# Patient Record
Sex: FEMALE | Race: WHITE | HISPANIC OR LATINO | Employment: OTHER | URBAN - METROPOLITAN AREA
[De-identification: names, ages, dates, MRNs, and addresses within clinical notes are randomized per-mention and may not be internally consistent; named-entity substitution may affect disease eponyms.]

---

## 2016-06-16 LAB — HCV AB SER-ACNC: NONREACTIVE

## 2017-01-18 ENCOUNTER — APPOINTMENT (EMERGENCY)
Dept: CT IMAGING | Facility: HOSPITAL | Age: 47
End: 2017-01-18
Payer: COMMERCIAL

## 2017-01-18 ENCOUNTER — APPOINTMENT (EMERGENCY)
Dept: RADIOLOGY | Facility: HOSPITAL | Age: 47
End: 2017-01-18
Payer: COMMERCIAL

## 2017-01-18 ENCOUNTER — HOSPITAL ENCOUNTER (EMERGENCY)
Facility: HOSPITAL | Age: 47
Discharge: HOME | End: 2017-01-19
Attending: EMERGENCY MEDICINE | Admitting: EMERGENCY MEDICINE
Payer: COMMERCIAL

## 2017-01-18 ENCOUNTER — ALLSCRIPTS OFFICE VISIT (OUTPATIENT)
Dept: OTHER | Facility: OTHER | Age: 47
End: 2017-01-18

## 2017-01-18 ENCOUNTER — APPOINTMENT (OUTPATIENT)
Dept: LAB | Facility: HOSPITAL | Age: 47
End: 2017-01-18
Payer: COMMERCIAL

## 2017-01-18 DIAGNOSIS — N39.0 URINARY TRACT INFECTION: Primary | ICD-10-CM

## 2017-01-18 DIAGNOSIS — R32 URINARY INCONTINENCE: ICD-10-CM

## 2017-01-18 DIAGNOSIS — R29.898 OTHER SYMPTOMS AND SIGNS INVOLVING THE MUSCULOSKELETAL SYSTEM: ICD-10-CM

## 2017-01-18 LAB
ALBUMIN SERPL BCP-MCNC: 3.4 G/DL (ref 3.5–5)
ALP SERPL-CCNC: 154 U/L (ref 46–116)
ALT SERPL W P-5'-P-CCNC: 26 U/L (ref 12–78)
ANION GAP SERPL CALCULATED.3IONS-SCNC: 9 MMOL/L (ref 4–13)
AST SERPL W P-5'-P-CCNC: 30 U/L (ref 5–45)
BACTERIA UR QL AUTO: ABNORMAL /HPF
BACTERIA UR QL AUTO: ABNORMAL /HPF
BASOPHILS # BLD AUTO: 0.02 THOUSANDS/ΜL (ref 0–0.1)
BASOPHILS NFR BLD AUTO: 0 % (ref 0–1)
BILIRUB SERPL-MCNC: 0.22 MG/DL (ref 0.2–1)
BILIRUB UR QL STRIP: NEGATIVE
BUN SERPL-MCNC: 28 MG/DL (ref 5–25)
CALCIUM SERPL-MCNC: 9.1 MG/DL (ref 8.3–10.1)
CHLORIDE SERPL-SCNC: 106 MMOL/L (ref 100–108)
CLARITY UR: CLEAR
CLARITY UR: CLEAR
CLARITY UR: NORMAL
CO2 SERPL-SCNC: 26 MMOL/L (ref 21–32)
COLOR UR: YELLOW
CREAT SERPL-MCNC: 1.99 MG/DL (ref 0.6–1.3)
EOSINOPHIL # BLD AUTO: 0.04 THOUSAND/ΜL (ref 0–0.61)
EOSINOPHIL NFR BLD AUTO: 1 % (ref 0–6)
ERYTHROCYTE [DISTWIDTH] IN BLOOD BY AUTOMATED COUNT: 13.4 % (ref 11.6–15.1)
GFR SERPL CREATININE-BSD FRML MDRD: 27 ML/MIN/1.73SQ M
GLUCOSE (HISTORICAL): NEGATIVE
GLUCOSE SERPL-MCNC: 97 MG/DL (ref 65–140)
GLUCOSE UR STRIP-MCNC: NEGATIVE MG/DL
GLUCOSE UR STRIP-MCNC: NEGATIVE MG/DL
HCG UR QL: NEGATIVE
HCT VFR BLD AUTO: 34.6 % (ref 34.8–46.1)
HGB BLD-MCNC: 11.6 G/DL (ref 11.5–15.4)
HGB UR QL STRIP.AUTO: ABNORMAL
HGB UR QL STRIP.AUTO: ABNORMAL
HGB UR QL STRIP.AUTO: NORMAL
KETONES UR STRIP-MCNC: NEGATIVE MG/DL
LEUKOCYTE ESTERASE UR QL STRIP: NEGATIVE
LIPASE SERPL-CCNC: 294 U/L (ref 73–393)
LYMPHOCYTES # BLD AUTO: 1.39 THOUSANDS/ΜL (ref 0.6–4.47)
LYMPHOCYTES NFR BLD AUTO: 24 % (ref 14–44)
MCH RBC QN AUTO: 31 PG (ref 26.8–34.3)
MCHC RBC AUTO-ENTMCNC: 33.5 G/DL (ref 31.4–37.4)
MCV RBC AUTO: 93 FL (ref 82–98)
MONOCYTES # BLD AUTO: 0.54 THOUSAND/ΜL (ref 0.17–1.22)
MONOCYTES NFR BLD AUTO: 10 % (ref 4–12)
NEUTROPHILS # BLD AUTO: 3.72 THOUSANDS/ΜL (ref 1.85–7.62)
NEUTS SEG NFR BLD AUTO: 65 % (ref 43–75)
NITRITE UR QL STRIP: NEGATIVE
NON-SQ EPI CELLS URNS QL MICRO: ABNORMAL /HPF
NON-SQ EPI CELLS URNS QL MICRO: ABNORMAL /HPF
NRBC BLD AUTO-RTO: 0 /100 WBCS
PH UR STRIP.AUTO: 5 [PH]
PH UR STRIP.AUTO: 5.5 [PH] (ref 4.5–8)
PH UR STRIP.AUTO: 6 [PH] (ref 4.5–8)
PLATELET # BLD AUTO: 282 THOUSANDS/UL (ref 149–390)
PMV BLD AUTO: 9.9 FL (ref 8.9–12.7)
POTASSIUM SERPL-SCNC: 4.2 MMOL/L (ref 3.5–5.3)
PROT SERPL-MCNC: 7.3 G/DL (ref 6.4–8.2)
PROT UR STRIP-MCNC: 300 MG/DL
PROT UR STRIP-MCNC: >=300 MG/DL
PROT UR STRIP-MCNC: ABNORMAL MG/DL
RBC # BLD AUTO: 3.74 MILLION/UL (ref 3.81–5.12)
RBC #/AREA URNS AUTO: ABNORMAL /HPF
RBC #/AREA URNS AUTO: ABNORMAL /HPF
SODIUM SERPL-SCNC: 141 MMOL/L (ref 136–145)
SP GR UR STRIP.AUTO: 1.01
SP GR UR STRIP.AUTO: 1.02 (ref 1–1.03)
SP GR UR STRIP.AUTO: >=1.03 (ref 1–1.03)
UROBILINOGEN UR QL STRIP.AUTO: 0.2
UROBILINOGEN UR QL STRIP.AUTO: 0.2 E.U./DL
UROBILINOGEN UR QL STRIP.AUTO: 0.2 E.U./DL
WBC # BLD AUTO: 5.71 THOUSAND/UL (ref 4.31–10.16)
WBC #/AREA URNS AUTO: ABNORMAL /HPF
WBC #/AREA URNS AUTO: ABNORMAL /HPF

## 2017-01-18 PROCEDURE — 87086 URINE CULTURE/COLONY COUNT: CPT

## 2017-01-18 PROCEDURE — 81025 URINE PREGNANCY TEST: CPT

## 2017-01-18 PROCEDURE — 93005 ELECTROCARDIOGRAM TRACING: CPT | Performed by: EMERGENCY MEDICINE

## 2017-01-18 PROCEDURE — 81002 URINALYSIS NONAUTO W/O SCOPE: CPT

## 2017-01-18 PROCEDURE — 80053 COMPREHEN METABOLIC PANEL: CPT | Performed by: EMERGENCY MEDICINE

## 2017-01-18 PROCEDURE — 96375 TX/PRO/DX INJ NEW DRUG ADDON: CPT

## 2017-01-18 PROCEDURE — 85025 COMPLETE CBC W/AUTO DIFF WBC: CPT | Performed by: EMERGENCY MEDICINE

## 2017-01-18 PROCEDURE — 71020 HB CHEST X-RAY 2VW FRONTAL&LATL: CPT

## 2017-01-18 PROCEDURE — 74176 CT ABD & PELVIS W/O CONTRAST: CPT

## 2017-01-18 PROCEDURE — 96374 THER/PROPH/DIAG INJ IV PUSH: CPT

## 2017-01-18 PROCEDURE — 81001 URINALYSIS AUTO W/SCOPE: CPT

## 2017-01-18 PROCEDURE — 84484 ASSAY OF TROPONIN QUANT: CPT | Performed by: EMERGENCY MEDICINE

## 2017-01-18 PROCEDURE — 83690 ASSAY OF LIPASE: CPT | Performed by: EMERGENCY MEDICINE

## 2017-01-18 PROCEDURE — 36415 COLL VENOUS BLD VENIPUNCTURE: CPT | Performed by: EMERGENCY MEDICINE

## 2017-01-18 RX ORDER — ONDANSETRON 2 MG/ML
4 INJECTION INTRAMUSCULAR; INTRAVENOUS ONCE
Status: COMPLETED | OUTPATIENT
Start: 2017-01-18 | End: 2017-01-18

## 2017-01-18 RX ADMIN — ONDANSETRON 4 MG: 2 INJECTION INTRAMUSCULAR; INTRAVENOUS at 22:08

## 2017-01-18 RX ADMIN — HYDROMORPHONE HYDROCHLORIDE 1 MG: 1 INJECTION, SOLUTION INTRAMUSCULAR; INTRAVENOUS; SUBCUTANEOUS at 22:10

## 2017-01-19 ENCOUNTER — GENERIC CONVERSION - ENCOUNTER (OUTPATIENT)
Dept: OTHER | Facility: OTHER | Age: 47
End: 2017-01-19

## 2017-01-19 VITALS
SYSTOLIC BLOOD PRESSURE: 118 MMHG | BODY MASS INDEX: 36.14 KG/M2 | HEART RATE: 73 BPM | TEMPERATURE: 98.1 F | OXYGEN SATURATION: 96 % | DIASTOLIC BLOOD PRESSURE: 56 MMHG | RESPIRATION RATE: 20 BRPM | WEIGHT: 197.6 LBS

## 2017-01-19 LAB
ATRIAL RATE: 77 BPM
BACTERIA UR CULT: NORMAL
P AXIS: 47 DEGREES
PR INTERVAL: 130 MS
QRS AXIS: -8 DEGREES
QRSD INTERVAL: 74 MS
QT INTERVAL: 356 MS
QTC INTERVAL: 402 MS
T WAVE AXIS: -33 DEGREES
TROPONIN I SERPL-MCNC: <0.02 NG/ML
VENTRICULAR RATE: 77 BPM

## 2017-01-19 PROCEDURE — 99285 EMERGENCY DEPT VISIT HI MDM: CPT

## 2017-01-19 RX ORDER — CEPHALEXIN 500 MG/1
500 CAPSULE ORAL 2 TIMES DAILY
Qty: 20 CAPSULE | Refills: 0 | Status: SHIPPED | OUTPATIENT
Start: 2017-01-19 | End: 2017-01-29

## 2017-01-20 LAB — BACTERIA UR CULT: NORMAL

## 2017-01-25 ENCOUNTER — ALLSCRIPTS OFFICE VISIT (OUTPATIENT)
Dept: OTHER | Facility: OTHER | Age: 47
End: 2017-01-25

## 2017-01-25 LAB
BILIRUB UR QL STRIP: NORMAL
CLARITY UR: NORMAL
COLOR UR: YELLOW
GLUCOSE (HISTORICAL): NORMAL
HGB UR QL STRIP.AUTO: NORMAL
KETONES UR STRIP-MCNC: NORMAL MG/DL
LEUKOCYTE ESTERASE UR QL STRIP: NORMAL
NITRITE UR QL STRIP: NORMAL
PH UR STRIP.AUTO: 5 [PH]
PROT UR STRIP-MCNC: NORMAL MG/DL
SP GR UR STRIP.AUTO: 1.03
UROBILINOGEN UR QL STRIP.AUTO: 0.2

## 2017-02-10 ENCOUNTER — HOSPITAL ENCOUNTER (OUTPATIENT)
Dept: SLEEP CENTER | Facility: CLINIC | Age: 47
Discharge: HOME | End: 2017-02-10
Payer: COMMERCIAL

## 2017-02-10 DIAGNOSIS — G47.411 CATAPLEXY: ICD-10-CM

## 2017-02-10 DIAGNOSIS — G47.9 DISTURBANCE, SLEEP: ICD-10-CM

## 2017-02-27 ENCOUNTER — ALLSCRIPTS OFFICE VISIT (OUTPATIENT)
Dept: OTHER | Facility: OTHER | Age: 47
End: 2017-02-27

## 2017-03-26 ENCOUNTER — HOSPITAL ENCOUNTER (EMERGENCY)
Facility: HOSPITAL | Age: 47
Discharge: HOME | End: 2017-03-26
Attending: EMERGENCY MEDICINE | Admitting: EMERGENCY MEDICINE
Payer: COMMERCIAL

## 2017-03-26 VITALS
BODY MASS INDEX: 30.48 KG/M2 | RESPIRATION RATE: 16 BRPM | TEMPERATURE: 97.4 F | HEART RATE: 79 BPM | OXYGEN SATURATION: 97 % | DIASTOLIC BLOOD PRESSURE: 67 MMHG | SYSTOLIC BLOOD PRESSURE: 164 MMHG | WEIGHT: 166.67 LBS

## 2017-03-26 DIAGNOSIS — N18.30 CKD (CHRONIC KIDNEY DISEASE) STAGE 3, GFR 30-59 ML/MIN (HCC): Primary | Chronic | ICD-10-CM

## 2017-03-26 DIAGNOSIS — R31.9 HEMATURIA: ICD-10-CM

## 2017-03-26 DIAGNOSIS — R35.0 URINARY FREQUENCY: ICD-10-CM

## 2017-03-26 LAB
ALBUMIN SERPL BCP-MCNC: 3.2 G/DL (ref 3.5–5)
ALP SERPL-CCNC: 163 U/L (ref 46–116)
ALT SERPL W P-5'-P-CCNC: 25 U/L (ref 12–78)
ANION GAP SERPL CALCULATED.3IONS-SCNC: 10 MMOL/L (ref 4–13)
AST SERPL W P-5'-P-CCNC: 16 U/L (ref 5–45)
BACTERIA UR QL AUTO: ABNORMAL /HPF
BASOPHILS # BLD AUTO: 0.02 THOUSANDS/ΜL (ref 0–0.1)
BASOPHILS NFR BLD AUTO: 0 % (ref 0–1)
BILIRUB SERPL-MCNC: <0.1 MG/DL (ref 0.2–1)
BILIRUB UR QL STRIP: NEGATIVE
BUN SERPL-MCNC: 38 MG/DL (ref 5–25)
CALCIUM SERPL-MCNC: 8.8 MG/DL (ref 8.3–10.1)
CHLORIDE SERPL-SCNC: 108 MMOL/L (ref 100–108)
CLARITY UR: CLEAR
CO2 SERPL-SCNC: 23 MMOL/L (ref 21–32)
COLOR UR: YELLOW
CREAT SERPL-MCNC: 1.81 MG/DL (ref 0.6–1.3)
EOSINOPHIL # BLD AUTO: 0.03 THOUSAND/ΜL (ref 0–0.61)
EOSINOPHIL NFR BLD AUTO: 1 % (ref 0–6)
ERYTHROCYTE [DISTWIDTH] IN BLOOD BY AUTOMATED COUNT: 12.9 % (ref 11.6–15.1)
GFR SERPL CREATININE-BSD FRML MDRD: 30.1 ML/MIN/1.73SQ M
GLUCOSE SERPL-MCNC: 97 MG/DL (ref 65–140)
GLUCOSE UR STRIP-MCNC: NEGATIVE MG/DL
HCG UR QL: NEGATIVE
HCT VFR BLD AUTO: 36.6 % (ref 34.8–46.1)
HGB BLD-MCNC: 12.4 G/DL (ref 11.5–15.4)
HGB UR QL STRIP.AUTO: ABNORMAL
KETONES UR STRIP-MCNC: NEGATIVE MG/DL
LEUKOCYTE ESTERASE UR QL STRIP: NEGATIVE
LYMPHOCYTES # BLD AUTO: 1.5 THOUSANDS/ΜL (ref 0.6–4.47)
LYMPHOCYTES NFR BLD AUTO: 31 % (ref 14–44)
MCH RBC QN AUTO: 31.3 PG (ref 26.8–34.3)
MCHC RBC AUTO-ENTMCNC: 33.9 G/DL (ref 31.4–37.4)
MCV RBC AUTO: 92 FL (ref 82–98)
MONOCYTES # BLD AUTO: 0.58 THOUSAND/ΜL (ref 0.17–1.22)
MONOCYTES NFR BLD AUTO: 12 % (ref 4–12)
NEUTROPHILS # BLD AUTO: 2.71 THOUSANDS/ΜL (ref 1.85–7.62)
NEUTS SEG NFR BLD AUTO: 56 % (ref 43–75)
NITRITE UR QL STRIP: NEGATIVE
NON-SQ EPI CELLS URNS QL MICRO: ABNORMAL /HPF
NRBC BLD AUTO-RTO: 0 /100 WBCS
PH UR STRIP.AUTO: 5.5 [PH] (ref 4.5–8)
PLATELET # BLD AUTO: 285 THOUSANDS/UL (ref 149–390)
PMV BLD AUTO: 9.9 FL (ref 8.9–12.7)
POTASSIUM SERPL-SCNC: 3.8 MMOL/L (ref 3.5–5.3)
PROT SERPL-MCNC: 6.7 G/DL (ref 6.4–8.2)
PROT UR STRIP-MCNC: >=300 MG/DL
RBC # BLD AUTO: 3.96 MILLION/UL (ref 3.81–5.12)
RBC #/AREA URNS AUTO: ABNORMAL /HPF
SODIUM SERPL-SCNC: 141 MMOL/L (ref 136–145)
SP GR UR STRIP.AUTO: 1.02 (ref 1–1.03)
UROBILINOGEN UR QL STRIP.AUTO: 0.2 E.U./DL
WBC # BLD AUTO: 4.84 THOUSAND/UL (ref 4.31–10.16)
WBC #/AREA URNS AUTO: ABNORMAL /HPF

## 2017-03-26 PROCEDURE — 81002 URINALYSIS NONAUTO W/O SCOPE: CPT

## 2017-03-26 PROCEDURE — 80053 COMPREHEN METABOLIC PANEL: CPT | Performed by: EMERGENCY MEDICINE

## 2017-03-26 PROCEDURE — 87086 URINE CULTURE/COLONY COUNT: CPT

## 2017-03-26 PROCEDURE — 36415 COLL VENOUS BLD VENIPUNCTURE: CPT | Performed by: EMERGENCY MEDICINE

## 2017-03-26 PROCEDURE — 99283 EMERGENCY DEPT VISIT LOW MDM: CPT

## 2017-03-26 PROCEDURE — 81001 URINALYSIS AUTO W/SCOPE: CPT

## 2017-03-26 PROCEDURE — 85025 COMPLETE CBC W/AUTO DIFF WBC: CPT | Performed by: EMERGENCY MEDICINE

## 2017-03-26 PROCEDURE — 81025 URINE PREGNANCY TEST: CPT

## 2017-03-27 LAB — BACTERIA UR CULT: NORMAL

## 2017-04-05 ENCOUNTER — ALLSCRIPTS OFFICE VISIT (OUTPATIENT)
Dept: OTHER | Facility: OTHER | Age: 47
End: 2017-04-05

## 2017-04-05 ENCOUNTER — LAB REQUISITION (OUTPATIENT)
Dept: LAB | Facility: HOSPITAL | Age: 47
End: 2017-04-05
Payer: COMMERCIAL

## 2017-04-05 DIAGNOSIS — N18.30 CHRONIC KIDNEY DISEASE, STAGE III (MODERATE) (HCC): ICD-10-CM

## 2017-04-05 DIAGNOSIS — E11.9 TYPE 2 DIABETES MELLITUS WITHOUT COMPLICATIONS (HCC): ICD-10-CM

## 2017-04-05 DIAGNOSIS — R41.3 OTHER AMNESIA: ICD-10-CM

## 2017-04-05 DIAGNOSIS — F31.4 BIPOLAR DISORDER, CURRENT EPISODE DEPRESSED, SEVERE, WITHOUT PSYCHOTIC FEATURES (HCC): ICD-10-CM

## 2017-04-05 DIAGNOSIS — R42 DIZZINESS AND GIDDINESS: ICD-10-CM

## 2017-04-05 DIAGNOSIS — R31.9 HEMATURIA: ICD-10-CM

## 2017-04-05 LAB
BACTERIA UR QL AUTO: ABNORMAL /HPF
BILIRUB UR QL STRIP: NEGATIVE
BILIRUB UR QL STRIP: NORMAL
CLARITY UR: CLEAR
CLARITY UR: NORMAL
COLOR UR: YELLOW
COLOR UR: YELLOW
GLUCOSE (HISTORICAL): NORMAL
GLUCOSE UR STRIP-MCNC: NEGATIVE MG/DL
HGB UR QL STRIP.AUTO: 2
HGB UR QL STRIP.AUTO: ABNORMAL
HYALINE CASTS #/AREA URNS LPF: ABNORMAL /LPF
KETONES UR STRIP-MCNC: NEGATIVE MG/DL
KETONES UR STRIP-MCNC: NORMAL MG/DL
LEUKOCYTE ESTERASE UR QL STRIP: 1
LEUKOCYTE ESTERASE UR QL STRIP: ABNORMAL
NITRITE UR QL STRIP: NEGATIVE
NITRITE UR QL STRIP: NORMAL
NON-SQ EPI CELLS URNS QL MICRO: ABNORMAL /HPF
PH UR STRIP.AUTO: 5 [PH]
PH UR STRIP.AUTO: 6 [PH] (ref 4.5–8)
PROT UR STRIP-MCNC: 3 MG/DL
PROT UR STRIP-MCNC: ABNORMAL MG/DL
RBC #/AREA URNS AUTO: ABNORMAL /HPF
SP GR UR STRIP.AUTO: 1.02 (ref 1–1.03)
SP GR UR STRIP.AUTO: 1.03
UROBILINOGEN UR QL STRIP.AUTO: 0.2 E.U./DL
UROBILINOGEN UR QL STRIP.AUTO: NORMAL
WBC #/AREA URNS AUTO: ABNORMAL /HPF

## 2017-04-05 PROCEDURE — 81001 URINALYSIS AUTO W/SCOPE: CPT | Performed by: PHYSICIAN ASSISTANT

## 2017-04-05 PROCEDURE — 87086 URINE CULTURE/COLONY COUNT: CPT | Performed by: PHYSICIAN ASSISTANT

## 2017-04-06 ENCOUNTER — GENERIC CONVERSION - ENCOUNTER (OUTPATIENT)
Dept: OTHER | Facility: OTHER | Age: 47
End: 2017-04-06

## 2017-04-06 LAB — BACTERIA UR CULT: NORMAL

## 2017-04-07 ENCOUNTER — GENERIC CONVERSION - ENCOUNTER (OUTPATIENT)
Dept: OTHER | Facility: OTHER | Age: 47
End: 2017-04-07

## 2017-04-25 ENCOUNTER — HOSPITAL ENCOUNTER (EMERGENCY)
Facility: HOSPITAL | Age: 47
Discharge: HOME | End: 2017-04-25
Attending: EMERGENCY MEDICINE | Admitting: EMERGENCY MEDICINE
Payer: COMMERCIAL

## 2017-04-25 VITALS
OXYGEN SATURATION: 100 % | DIASTOLIC BLOOD PRESSURE: 94 MMHG | SYSTOLIC BLOOD PRESSURE: 149 MMHG | WEIGHT: 168 LBS | HEART RATE: 81 BPM | BODY MASS INDEX: 30.73 KG/M2 | TEMPERATURE: 98.3 F | RESPIRATION RATE: 18 BRPM

## 2017-04-25 DIAGNOSIS — R10.9 ABDOMINAL PAIN: Primary | ICD-10-CM

## 2017-04-25 LAB
ALBUMIN SERPL BCP-MCNC: 3 G/DL (ref 3.5–5)
ALP SERPL-CCNC: 177 U/L (ref 46–116)
ALT SERPL W P-5'-P-CCNC: 25 U/L (ref 12–78)
ANION GAP SERPL CALCULATED.3IONS-SCNC: 9 MMOL/L (ref 4–13)
AST SERPL W P-5'-P-CCNC: 20 U/L (ref 5–45)
ATRIAL RATE: 70 BPM
ATRIAL RATE: 73 BPM
BACTERIA UR QL AUTO: ABNORMAL /HPF
BASOPHILS # BLD AUTO: 0.02 THOUSANDS/ΜL (ref 0–0.1)
BASOPHILS NFR BLD AUTO: 0 % (ref 0–1)
BILIRUB SERPL-MCNC: 0.16 MG/DL (ref 0.2–1)
BILIRUB UR QL STRIP: NEGATIVE
BUN SERPL-MCNC: 34 MG/DL (ref 5–25)
CALCIUM SERPL-MCNC: 8.5 MG/DL (ref 8.3–10.1)
CHLORIDE SERPL-SCNC: 106 MMOL/L (ref 100–108)
CLARITY UR: CLEAR
CLARITY, POC: CLEAR
CO2 SERPL-SCNC: 24 MMOL/L (ref 21–32)
COLOR UR: YELLOW
COLOR, POC: YELLOW
CREAT SERPL-MCNC: 2.07 MG/DL (ref 0.6–1.3)
EOSINOPHIL # BLD AUTO: 0.03 THOUSAND/ΜL (ref 0–0.61)
EOSINOPHIL NFR BLD AUTO: 1 % (ref 0–6)
ERYTHROCYTE [DISTWIDTH] IN BLOOD BY AUTOMATED COUNT: 12.9 % (ref 11.6–15.1)
GFR SERPL CREATININE-BSD FRML MDRD: 25.8 ML/MIN/1.73SQ M
GLUCOSE SERPL-MCNC: 104 MG/DL (ref 65–140)
GLUCOSE UR STRIP-MCNC: NEGATIVE MG/DL
HCT VFR BLD AUTO: 37 % (ref 34.8–46.1)
HGB BLD-MCNC: 12.2 G/DL (ref 11.5–15.4)
HGB UR QL STRIP.AUTO: ABNORMAL
KETONES UR STRIP-MCNC: NEGATIVE MG/DL
LEUKOCYTE ESTERASE UR QL STRIP: NEGATIVE
LIPASE SERPL-CCNC: 331 U/L (ref 73–393)
LYMPHOCYTES # BLD AUTO: 1.43 THOUSANDS/ΜL (ref 0.6–4.47)
LYMPHOCYTES NFR BLD AUTO: 26 % (ref 14–44)
MCH RBC QN AUTO: 30.4 PG (ref 26.8–34.3)
MCHC RBC AUTO-ENTMCNC: 33 G/DL (ref 31.4–37.4)
MCV RBC AUTO: 92 FL (ref 82–98)
MONOCYTES # BLD AUTO: 0.6 THOUSAND/ΜL (ref 0.17–1.22)
MONOCYTES NFR BLD AUTO: 11 % (ref 4–12)
NEUTROPHILS # BLD AUTO: 3.48 THOUSANDS/ΜL (ref 1.85–7.62)
NEUTS SEG NFR BLD AUTO: 62 % (ref 43–75)
NITRITE UR QL STRIP: NEGATIVE
NON-SQ EPI CELLS URNS QL MICRO: ABNORMAL /HPF
NRBC BLD AUTO-RTO: 0 /100 WBCS
P AXIS: 63 DEGREES
P AXIS: 64 DEGREES
PH UR STRIP.AUTO: 6.5 [PH] (ref 4.5–8)
PLATELET # BLD AUTO: 303 THOUSANDS/UL (ref 149–390)
PMV BLD AUTO: 10 FL (ref 8.9–12.7)
POTASSIUM SERPL-SCNC: 4.8 MMOL/L (ref 3.5–5.3)
PR INTERVAL: 136 MS
PR INTERVAL: 138 MS
PROT SERPL-MCNC: 7 G/DL (ref 6.4–8.2)
PROT UR STRIP-MCNC: >=300 MG/DL
QRS AXIS: 10 DEGREES
QRS AXIS: 7 DEGREES
QRSD INTERVAL: 64 MS
QRSD INTERVAL: 64 MS
QT INTERVAL: 394 MS
QT INTERVAL: 400 MS
QTC INTERVAL: 432 MS
QTC INTERVAL: 434 MS
RBC # BLD AUTO: 4.01 MILLION/UL (ref 3.81–5.12)
RBC #/AREA URNS AUTO: ABNORMAL /HPF
SODIUM SERPL-SCNC: 139 MMOL/L (ref 136–145)
SP GR UR STRIP.AUTO: 1.02 (ref 1–1.03)
T WAVE AXIS: 46 DEGREES
T WAVE AXIS: 47 DEGREES
UROBILINOGEN UR QL STRIP.AUTO: 0.2 E.U./DL
VENTRICULAR RATE: 70 BPM
VENTRICULAR RATE: 73 BPM
WBC # BLD AUTO: 5.56 THOUSAND/UL (ref 4.31–10.16)
WBC #/AREA URNS AUTO: ABNORMAL /HPF

## 2017-04-25 PROCEDURE — 80053 COMPREHEN METABOLIC PANEL: CPT

## 2017-04-25 PROCEDURE — 83690 ASSAY OF LIPASE: CPT | Performed by: EMERGENCY MEDICINE

## 2017-04-25 PROCEDURE — 93005 ELECTROCARDIOGRAM TRACING: CPT

## 2017-04-25 PROCEDURE — 87086 URINE CULTURE/COLONY COUNT: CPT

## 2017-04-25 PROCEDURE — C9113 INJ PANTOPRAZOLE SODIUM, VIA: HCPCS | Performed by: EMERGENCY MEDICINE

## 2017-04-25 PROCEDURE — 81001 URINALYSIS AUTO W/SCOPE: CPT

## 2017-04-25 PROCEDURE — 85025 COMPLETE CBC W/AUTO DIFF WBC: CPT | Performed by: EMERGENCY MEDICINE

## 2017-04-25 PROCEDURE — 36415 COLL VENOUS BLD VENIPUNCTURE: CPT

## 2017-04-25 PROCEDURE — 96361 HYDRATE IV INFUSION ADD-ON: CPT

## 2017-04-25 PROCEDURE — 81002 URINALYSIS NONAUTO W/O SCOPE: CPT

## 2017-04-25 PROCEDURE — 99284 EMERGENCY DEPT VISIT MOD MDM: CPT

## 2017-04-25 PROCEDURE — 96375 TX/PRO/DX INJ NEW DRUG ADDON: CPT

## 2017-04-25 PROCEDURE — 96374 THER/PROPH/DIAG INJ IV PUSH: CPT

## 2017-04-25 RX ORDER — ONDANSETRON 4 MG/1
4 TABLET, FILM COATED ORAL EVERY 6 HOURS
Qty: 120 TABLET | Refills: 0 | Status: SHIPPED | OUTPATIENT
Start: 2017-04-25 | End: 2017-07-08

## 2017-04-25 RX ORDER — ONDANSETRON 4 MG/1
4 TABLET, FILM COATED ORAL EVERY 6 HOURS
Qty: 15 TABLET | Refills: 0 | Status: SHIPPED | OUTPATIENT
Start: 2017-04-25 | End: 2017-07-08

## 2017-04-25 RX ORDER — ONDANSETRON 2 MG/ML
4 INJECTION INTRAMUSCULAR; INTRAVENOUS ONCE
Status: COMPLETED | OUTPATIENT
Start: 2017-04-25 | End: 2017-04-25

## 2017-04-25 RX ORDER — MORPHINE SULFATE 4 MG/ML
4 INJECTION, SOLUTION INTRAMUSCULAR; INTRAVENOUS ONCE
Status: COMPLETED | OUTPATIENT
Start: 2017-04-25 | End: 2017-04-25

## 2017-04-25 RX ORDER — PANTOPRAZOLE SODIUM 40 MG/1
40 INJECTION, POWDER, FOR SOLUTION INTRAVENOUS ONCE
Status: COMPLETED | OUTPATIENT
Start: 2017-04-25 | End: 2017-04-25

## 2017-04-25 RX ADMIN — MORPHINE SULFATE 4 MG: 4 INJECTION, SOLUTION INTRAMUSCULAR; INTRAVENOUS at 19:03

## 2017-04-25 RX ADMIN — ONDANSETRON 4 MG: 2 INJECTION INTRAMUSCULAR; INTRAVENOUS at 17:23

## 2017-04-25 RX ADMIN — PANTOPRAZOLE SODIUM 40 MG: 40 INJECTION, POWDER, FOR SOLUTION INTRAVENOUS at 17:23

## 2017-04-25 RX ADMIN — SODIUM CHLORIDE 1000 ML: 0.9 INJECTION, SOLUTION INTRAVENOUS at 18:13

## 2017-04-26 LAB — BACTERIA UR CULT: NORMAL

## 2017-04-28 ENCOUNTER — ALLSCRIPTS OFFICE VISIT (OUTPATIENT)
Dept: OTHER | Facility: OTHER | Age: 47
End: 2017-04-28

## 2017-05-24 ENCOUNTER — ALLSCRIPTS OFFICE VISIT (OUTPATIENT)
Dept: OTHER | Facility: OTHER | Age: 47
End: 2017-05-24

## 2017-05-24 DIAGNOSIS — M54.50 LOW BACK PAIN: ICD-10-CM

## 2017-06-30 ENCOUNTER — GENERIC CONVERSION - ENCOUNTER (OUTPATIENT)
Dept: OTHER | Facility: OTHER | Age: 47
End: 2017-06-30

## 2017-07-08 ENCOUNTER — HOSPITAL ENCOUNTER (EMERGENCY)
Facility: HOSPITAL | Age: 47
Discharge: HOME | End: 2017-07-08
Attending: EMERGENCY MEDICINE
Payer: COMMERCIAL

## 2017-07-08 VITALS
RESPIRATION RATE: 16 BRPM | BODY MASS INDEX: 31.09 KG/M2 | OXYGEN SATURATION: 99 % | WEIGHT: 170 LBS | DIASTOLIC BLOOD PRESSURE: 84 MMHG | SYSTOLIC BLOOD PRESSURE: 131 MMHG | HEART RATE: 68 BPM | TEMPERATURE: 99.4 F

## 2017-07-08 DIAGNOSIS — R10.9 FLANK PAIN: Primary | ICD-10-CM

## 2017-07-08 LAB
ALBUMIN SERPL BCP-MCNC: 2.9 G/DL (ref 3.5–5)
ALP SERPL-CCNC: 167 U/L (ref 46–116)
ALT SERPL W P-5'-P-CCNC: 23 U/L (ref 12–78)
ANION GAP SERPL CALCULATED.3IONS-SCNC: 12 MMOL/L (ref 4–13)
AST SERPL W P-5'-P-CCNC: 16 U/L (ref 5–45)
BACTERIA UR QL AUTO: ABNORMAL /HPF
BASOPHILS # BLD AUTO: 0.01 THOUSANDS/ΜL (ref 0–0.1)
BASOPHILS NFR BLD AUTO: 0 % (ref 0–1)
BILIRUB SERPL-MCNC: 0.23 MG/DL (ref 0.2–1)
BILIRUB UR QL STRIP: NEGATIVE
BUN SERPL-MCNC: 35 MG/DL (ref 5–25)
CALCIUM SERPL-MCNC: 8.3 MG/DL (ref 8.3–10.1)
CHLORIDE SERPL-SCNC: 105 MMOL/L (ref 100–108)
CLARITY UR: CLEAR
CO2 SERPL-SCNC: 21 MMOL/L (ref 21–32)
COLOR UR: YELLOW
CREAT SERPL-MCNC: 1.93 MG/DL (ref 0.6–1.3)
EOSINOPHIL # BLD AUTO: 0.03 THOUSAND/ΜL (ref 0–0.61)
EOSINOPHIL NFR BLD AUTO: 1 % (ref 0–6)
ERYTHROCYTE [DISTWIDTH] IN BLOOD BY AUTOMATED COUNT: 13.2 % (ref 11.6–15.1)
GFR SERPL CREATININE-BSD FRML MDRD: 27.9 ML/MIN/1.73SQ M
GLUCOSE SERPL-MCNC: 213 MG/DL (ref 65–140)
GLUCOSE UR STRIP-MCNC: NEGATIVE MG/DL
HCT VFR BLD AUTO: 37.1 % (ref 34.8–46.1)
HGB BLD-MCNC: 12.4 G/DL (ref 11.5–15.4)
HGB UR QL STRIP.AUTO: ABNORMAL
KETONES UR STRIP-MCNC: NEGATIVE MG/DL
LEUKOCYTE ESTERASE UR QL STRIP: ABNORMAL
LIPASE SERPL-CCNC: 337 U/L (ref 73–393)
LYMPHOCYTES # BLD AUTO: 1.22 THOUSANDS/ΜL (ref 0.6–4.47)
LYMPHOCYTES NFR BLD AUTO: 22 % (ref 14–44)
MAGNESIUM SERPL-MCNC: 1.9 MG/DL (ref 1.6–2.6)
MCH RBC QN AUTO: 30.4 PG (ref 26.8–34.3)
MCHC RBC AUTO-ENTMCNC: 33.4 G/DL (ref 31.4–37.4)
MCV RBC AUTO: 91 FL (ref 82–98)
MONOCYTES # BLD AUTO: 0.44 THOUSAND/ΜL (ref 0.17–1.22)
MONOCYTES NFR BLD AUTO: 8 % (ref 4–12)
NEUTROPHILS # BLD AUTO: 3.95 THOUSANDS/ΜL (ref 1.85–7.62)
NEUTS SEG NFR BLD AUTO: 69 % (ref 43–75)
NITRITE UR QL STRIP: NEGATIVE
NON-SQ EPI CELLS URNS QL MICRO: ABNORMAL /HPF
NRBC BLD AUTO-RTO: 0 /100 WBCS
PH UR STRIP.AUTO: 5 [PH] (ref 4.5–8)
PLATELET # BLD AUTO: 271 THOUSANDS/UL (ref 149–390)
PMV BLD AUTO: 9.9 FL (ref 8.9–12.7)
POTASSIUM SERPL-SCNC: 4.3 MMOL/L (ref 3.5–5.3)
PROT SERPL-MCNC: 7 G/DL (ref 6.4–8.2)
PROT UR STRIP-MCNC: >=300 MG/DL
RBC # BLD AUTO: 4.08 MILLION/UL (ref 3.81–5.12)
RBC #/AREA URNS AUTO: ABNORMAL /HPF
SODIUM SERPL-SCNC: 138 MMOL/L (ref 136–145)
SP GR UR STRIP.AUTO: 1.02 (ref 1–1.03)
UROBILINOGEN UR QL STRIP.AUTO: 0.2 E.U./DL
WBC # BLD AUTO: 5.65 THOUSAND/UL (ref 4.31–10.16)
WBC #/AREA URNS AUTO: ABNORMAL /HPF

## 2017-07-08 PROCEDURE — 83735 ASSAY OF MAGNESIUM: CPT | Performed by: EMERGENCY MEDICINE

## 2017-07-08 PROCEDURE — 81002 URINALYSIS NONAUTO W/O SCOPE: CPT | Performed by: EMERGENCY MEDICINE

## 2017-07-08 PROCEDURE — 81001 URINALYSIS AUTO W/SCOPE: CPT

## 2017-07-08 PROCEDURE — 96374 THER/PROPH/DIAG INJ IV PUSH: CPT

## 2017-07-08 PROCEDURE — 83690 ASSAY OF LIPASE: CPT | Performed by: EMERGENCY MEDICINE

## 2017-07-08 PROCEDURE — 85025 COMPLETE CBC W/AUTO DIFF WBC: CPT | Performed by: EMERGENCY MEDICINE

## 2017-07-08 PROCEDURE — 80053 COMPREHEN METABOLIC PANEL: CPT | Performed by: EMERGENCY MEDICINE

## 2017-07-08 PROCEDURE — 36415 COLL VENOUS BLD VENIPUNCTURE: CPT | Performed by: EMERGENCY MEDICINE

## 2017-07-08 PROCEDURE — 96361 HYDRATE IV INFUSION ADD-ON: CPT

## 2017-07-08 PROCEDURE — 99284 EMERGENCY DEPT VISIT MOD MDM: CPT

## 2017-07-08 RX ORDER — INSULIN GLARGINE 100 [IU]/ML
22 INJECTION, SOLUTION SUBCUTANEOUS
COMMUNITY
End: 2017-12-25 | Stop reason: HOSPADM

## 2017-07-08 RX ORDER — METHOCARBAMOL 500 MG/1
500 TABLET, FILM COATED ORAL 3 TIMES DAILY
COMMUNITY
End: 2017-07-25 | Stop reason: HOSPADM

## 2017-07-08 RX ORDER — OXYCODONE AND ACETAMINOPHEN 7.5; 325 MG/1; MG/1
1 TABLET ORAL EVERY 6 HOURS PRN
COMMUNITY
End: 2018-01-25 | Stop reason: SDUPTHER

## 2017-07-08 RX ORDER — ONDANSETRON 4 MG/1
4 TABLET, FILM COATED ORAL EVERY 8 HOURS PRN
COMMUNITY
End: 2018-04-30 | Stop reason: SDUPTHER

## 2017-07-08 RX ORDER — ATOVAQUONE 750 MG/5ML
1500 SUSPENSION ORAL DAILY
COMMUNITY
End: 2017-07-25 | Stop reason: HOSPADM

## 2017-07-08 RX ORDER — ATORVASTATIN CALCIUM 20 MG/1
20 TABLET, FILM COATED ORAL DAILY
COMMUNITY
End: 2018-02-20

## 2017-07-08 RX ORDER — DEXTROAMPHETAMINE SACCHARATE, AMPHETAMINE ASPARTATE, DEXTROAMPHETAMINE SULFATE AND AMPHETAMINE SULFATE 5; 5; 5; 5 MG/1; MG/1; MG/1; MG/1
20 TABLET ORAL 2 TIMES DAILY
COMMUNITY
End: 2018-01-25 | Stop reason: SDUPTHER

## 2017-07-08 RX ORDER — MECLIZINE HYDROCHLORIDE 25 MG/1
25 TABLET ORAL 3 TIMES DAILY PRN
COMMUNITY
End: 2017-07-25 | Stop reason: HOSPADM

## 2017-07-08 RX ORDER — ONDANSETRON 2 MG/ML
4 INJECTION INTRAMUSCULAR; INTRAVENOUS ONCE
Status: COMPLETED | OUTPATIENT
Start: 2017-07-08 | End: 2017-07-08

## 2017-07-08 RX ORDER — DRONABINOL 2.5 MG/1
2.5 CAPSULE ORAL DAILY
COMMUNITY
End: 2017-07-25 | Stop reason: HOSPADM

## 2017-07-08 RX ORDER — GABAPENTIN 100 MG/1
300 CAPSULE ORAL 3 TIMES DAILY
COMMUNITY
End: 2018-02-01 | Stop reason: SDUPTHER

## 2017-07-08 RX ORDER — LISINOPRIL 10 MG/1
10 TABLET ORAL DAILY
COMMUNITY
End: 2017-07-25 | Stop reason: HOSPADM

## 2017-07-08 RX ADMIN — ONDANSETRON 4 MG: 2 INJECTION INTRAMUSCULAR; INTRAVENOUS at 09:38

## 2017-07-08 RX ADMIN — SODIUM CHLORIDE 1000 ML: 0.9 INJECTION, SOLUTION INTRAVENOUS at 09:35

## 2017-07-13 ENCOUNTER — ALLSCRIPTS OFFICE VISIT (OUTPATIENT)
Dept: OTHER | Facility: OTHER | Age: 47
End: 2017-07-13

## 2017-07-13 DIAGNOSIS — R06.00 DYSPNEA: ICD-10-CM

## 2017-07-13 DIAGNOSIS — R20.0 ANESTHESIA OF SKIN: ICD-10-CM

## 2017-07-13 DIAGNOSIS — N83.209 CYST OF OVARY: ICD-10-CM

## 2017-07-13 DIAGNOSIS — R29.898 OTHER SYMPTOMS AND SIGNS INVOLVING THE MUSCULOSKELETAL SYSTEM: ICD-10-CM

## 2017-07-13 DIAGNOSIS — M54.31 SCIATICA OF RIGHT SIDE: ICD-10-CM

## 2017-07-13 DIAGNOSIS — M54.50 LOW BACK PAIN: ICD-10-CM

## 2017-07-18 ENCOUNTER — TRANSCRIBE ORDERS (OUTPATIENT)
Dept: ADMINISTRATIVE | Facility: HOSPITAL | Age: 47
End: 2017-07-18

## 2017-07-18 DIAGNOSIS — R29.898 OTHER SYMPTOMS REFERABLE TO JOINT, SITE UNSPECIFIED: Primary | ICD-10-CM

## 2017-07-24 ENCOUNTER — HOSPITAL ENCOUNTER (OUTPATIENT)
Dept: ULTRASOUND IMAGING | Facility: HOSPITAL | Age: 47
Discharge: HOME | End: 2017-07-24
Payer: COMMERCIAL

## 2017-07-24 ENCOUNTER — HOSPITAL ENCOUNTER (OUTPATIENT)
Facility: HOSPITAL | Age: 47
Setting detail: OBSERVATION
Discharge: HOME | End: 2017-07-25
Attending: EMERGENCY MEDICINE | Admitting: INTERNAL MEDICINE
Payer: COMMERCIAL

## 2017-07-24 ENCOUNTER — HOSPITAL ENCOUNTER (OUTPATIENT)
Dept: MRI IMAGING | Facility: HOSPITAL | Age: 47
Discharge: HOME | End: 2017-07-24
Payer: COMMERCIAL

## 2017-07-24 ENCOUNTER — HOSPITAL ENCOUNTER (OUTPATIENT)
Dept: CT IMAGING | Facility: HOSPITAL | Age: 47
Discharge: HOME | End: 2017-07-24
Payer: COMMERCIAL

## 2017-07-24 DIAGNOSIS — N83.209 CYST OF OVARY: ICD-10-CM

## 2017-07-24 DIAGNOSIS — M54.6 THORACIC SPINE PAIN: ICD-10-CM

## 2017-07-24 DIAGNOSIS — R06.00 DYSPNEA: ICD-10-CM

## 2017-07-24 DIAGNOSIS — N17.9 AKI (ACUTE KIDNEY INJURY) (HCC): Primary | ICD-10-CM

## 2017-07-24 DIAGNOSIS — R29.898 OTHER SYMPTOMS AND SIGNS INVOLVING THE MUSCULOSKELETAL SYSTEM: ICD-10-CM

## 2017-07-24 DIAGNOSIS — N83.299 COMPLEX OVARIAN CYST: ICD-10-CM

## 2017-07-24 DIAGNOSIS — R20.0 ANESTHESIA OF SKIN: ICD-10-CM

## 2017-07-24 DIAGNOSIS — M54.31 SCIATICA OF RIGHT SIDE: ICD-10-CM

## 2017-07-24 LAB
ANION GAP SERPL CALCULATED.3IONS-SCNC: 9 MMOL/L (ref 4–13)
BACTERIA UR QL AUTO: ABNORMAL /HPF
BASOPHILS # BLD AUTO: 0.01 THOUSANDS/ΜL (ref 0–0.1)
BASOPHILS NFR BLD AUTO: 0 % (ref 0–1)
BILIRUB UR QL STRIP: NEGATIVE
BUN SERPL-MCNC: 41 MG/DL (ref 5–25)
CALCIUM SERPL-MCNC: 8.5 MG/DL (ref 8.3–10.1)
CHLORIDE SERPL-SCNC: 106 MMOL/L (ref 100–108)
CLARITY UR: CLEAR
CO2 SERPL-SCNC: 25 MMOL/L (ref 21–32)
COLOR UR: YELLOW
CREAT SERPL-MCNC: 2.24 MG/DL (ref 0.6–1.3)
EOSINOPHIL # BLD AUTO: 0.02 THOUSAND/ΜL (ref 0–0.61)
EOSINOPHIL NFR BLD AUTO: 0 % (ref 0–6)
ERYTHROCYTE [DISTWIDTH] IN BLOOD BY AUTOMATED COUNT: 13.2 % (ref 11.6–15.1)
GFR SERPL CREATININE-BSD FRML MDRD: 26 ML/MIN/1.73SQ M
GLUCOSE SERPL-MCNC: 132 MG/DL (ref 65–140)
GLUCOSE UR STRIP-MCNC: NEGATIVE MG/DL
HCT VFR BLD AUTO: 36 % (ref 34.8–46.1)
HGB BLD-MCNC: 12.3 G/DL (ref 11.5–15.4)
HGB UR QL STRIP.AUTO: ABNORMAL
HYALINE CASTS #/AREA URNS LPF: ABNORMAL /LPF
KETONES UR STRIP-MCNC: NEGATIVE MG/DL
LEUKOCYTE ESTERASE UR QL STRIP: ABNORMAL
LYMPHOCYTES # BLD AUTO: 1.86 THOUSANDS/ΜL (ref 0.6–4.47)
LYMPHOCYTES NFR BLD AUTO: 31 % (ref 14–44)
MCH RBC QN AUTO: 31.1 PG (ref 26.8–34.3)
MCHC RBC AUTO-ENTMCNC: 34.2 G/DL (ref 31.4–37.4)
MCV RBC AUTO: 91 FL (ref 82–98)
MONOCYTES # BLD AUTO: 0.6 THOUSAND/ΜL (ref 0.17–1.22)
MONOCYTES NFR BLD AUTO: 10 % (ref 4–12)
MUCOUS THREADS UR QL AUTO: ABNORMAL
NEUTROPHILS # BLD AUTO: 3.45 THOUSANDS/ΜL (ref 1.85–7.62)
NEUTS SEG NFR BLD AUTO: 59 % (ref 43–75)
NITRITE UR QL STRIP: NEGATIVE
NON-SQ EPI CELLS URNS QL MICRO: ABNORMAL /HPF
NRBC BLD AUTO-RTO: 0 /100 WBCS
PH UR STRIP.AUTO: 5.5 [PH] (ref 4.5–8)
PLATELET # BLD AUTO: 285 THOUSANDS/UL (ref 149–390)
PMV BLD AUTO: 9.7 FL (ref 8.9–12.7)
POTASSIUM SERPL-SCNC: 4.5 MMOL/L (ref 3.5–5.3)
PROT UR STRIP-MCNC: >=300 MG/DL
RBC # BLD AUTO: 3.95 MILLION/UL (ref 3.81–5.12)
RBC #/AREA URNS AUTO: ABNORMAL /HPF
SODIUM SERPL-SCNC: 140 MMOL/L (ref 136–145)
SP GR UR STRIP.AUTO: >=1.03 (ref 1–1.03)
SPECIMEN SOURCE: NORMAL
TROPONIN I BLD-MCNC: 0 NG/ML (ref 0–0.08)
UROBILINOGEN UR QL STRIP.AUTO: 0.2 E.U./DL
WBC # BLD AUTO: 5.94 THOUSAND/UL (ref 4.31–10.16)
WBC #/AREA URNS AUTO: ABNORMAL /HPF

## 2017-07-24 PROCEDURE — 71250 CT THORAX DX C-: CPT

## 2017-07-24 PROCEDURE — 85025 COMPLETE CBC W/AUTO DIFF WBC: CPT | Performed by: EMERGENCY MEDICINE

## 2017-07-24 PROCEDURE — 84484 ASSAY OF TROPONIN QUANT: CPT

## 2017-07-24 PROCEDURE — 81001 URINALYSIS AUTO W/SCOPE: CPT

## 2017-07-24 PROCEDURE — 87086 URINE CULTURE/COLONY COUNT: CPT

## 2017-07-24 PROCEDURE — 80048 BASIC METABOLIC PNL TOTAL CA: CPT | Performed by: EMERGENCY MEDICINE

## 2017-07-24 PROCEDURE — 96375 TX/PRO/DX INJ NEW DRUG ADDON: CPT

## 2017-07-24 PROCEDURE — 96374 THER/PROPH/DIAG INJ IV PUSH: CPT

## 2017-07-24 PROCEDURE — 76856 US EXAM PELVIC COMPLETE: CPT

## 2017-07-24 PROCEDURE — 36415 COLL VENOUS BLD VENIPUNCTURE: CPT | Performed by: EMERGENCY MEDICINE

## 2017-07-24 PROCEDURE — 81002 URINALYSIS NONAUTO W/O SCOPE: CPT | Performed by: EMERGENCY MEDICINE

## 2017-07-24 PROCEDURE — 72148 MRI LUMBAR SPINE W/O DYE: CPT

## 2017-07-24 PROCEDURE — 76830 TRANSVAGINAL US NON-OB: CPT

## 2017-07-24 RX ORDER — ONDANSETRON 2 MG/ML
4 INJECTION INTRAMUSCULAR; INTRAVENOUS ONCE
Status: COMPLETED | OUTPATIENT
Start: 2017-07-24 | End: 2017-07-24

## 2017-07-24 RX ADMIN — ONDANSETRON 4 MG: 2 INJECTION INTRAMUSCULAR; INTRAVENOUS at 22:44

## 2017-07-24 RX ADMIN — HYDROMORPHONE HYDROCHLORIDE 1 MG: 1 INJECTION, SOLUTION INTRAMUSCULAR; INTRAVENOUS; SUBCUTANEOUS at 22:51

## 2017-07-25 VITALS
RESPIRATION RATE: 16 BRPM | OXYGEN SATURATION: 95 % | HEART RATE: 71 BPM | BODY MASS INDEX: 32.92 KG/M2 | TEMPERATURE: 96.9 F | WEIGHT: 180 LBS | DIASTOLIC BLOOD PRESSURE: 64 MMHG | SYSTOLIC BLOOD PRESSURE: 116 MMHG

## 2017-07-25 PROBLEM — N18.9 ACUTE KIDNEY INJURY SUPERIMPOSED ON CHRONIC KIDNEY DISEASE (HCC): Status: ACTIVE | Noted: 2017-07-25

## 2017-07-25 PROBLEM — Z91.199 NONCOMPLIANCE: Status: ACTIVE | Noted: 2017-07-25

## 2017-07-25 PROBLEM — G89.29 CHRONIC PAIN: Status: ACTIVE | Noted: 2017-07-25

## 2017-07-25 PROBLEM — N17.9 ACUTE KIDNEY INJURY SUPERIMPOSED ON CHRONIC KIDNEY DISEASE (HCC): Status: RESOLVED | Noted: 2017-07-25 | Resolved: 2017-07-25

## 2017-07-25 PROBLEM — N17.9 ACUTE KIDNEY INJURY SUPERIMPOSED ON CHRONIC KIDNEY DISEASE (HCC): Status: ACTIVE | Noted: 2017-07-25

## 2017-07-25 PROBLEM — N18.9 ACUTE KIDNEY INJURY SUPERIMPOSED ON CHRONIC KIDNEY DISEASE (HCC): Status: RESOLVED | Noted: 2017-07-25 | Resolved: 2017-07-25

## 2017-07-25 PROBLEM — R26.2 AMBULATORY DYSFUNCTION: Status: ACTIVE | Noted: 2017-07-25

## 2017-07-25 PROBLEM — Z91.19 NONCOMPLIANCE: Status: ACTIVE | Noted: 2017-07-25

## 2017-07-25 LAB
ANION GAP SERPL CALCULATED.3IONS-SCNC: 8 MMOL/L (ref 4–13)
BUN SERPL-MCNC: 34 MG/DL (ref 5–25)
CALCIUM SERPL-MCNC: 7.2 MG/DL (ref 8.3–10.1)
CHLORIDE SERPL-SCNC: 113 MMOL/L (ref 100–108)
CO2 SERPL-SCNC: 21 MMOL/L (ref 21–32)
CREAT SERPL-MCNC: 1.76 MG/DL (ref 0.6–1.3)
CREAT UR-MCNC: 63.2 MG/DL
ERYTHROCYTE [DISTWIDTH] IN BLOOD BY AUTOMATED COUNT: 13.5 % (ref 11.6–15.1)
EST. AVERAGE GLUCOSE BLD GHB EST-MCNC: 131 MG/DL
GFR SERPL CREATININE-BSD FRML MDRD: 34 ML/MIN/1.73SQ M
GLUCOSE P FAST SERPL-MCNC: 89 MG/DL (ref 65–99)
GLUCOSE SERPL-MCNC: 118 MG/DL (ref 65–140)
GLUCOSE SERPL-MCNC: 124 MG/DL (ref 65–140)
GLUCOSE SERPL-MCNC: 89 MG/DL (ref 65–140)
HBA1C MFR BLD: 6.2 % (ref 4.2–6.3)
HCT VFR BLD AUTO: 31.2 % (ref 34.8–46.1)
HGB BLD-MCNC: 10.4 G/DL (ref 11.5–15.4)
MCH RBC QN AUTO: 30.6 PG (ref 26.8–34.3)
MCHC RBC AUTO-ENTMCNC: 33.3 G/DL (ref 31.4–37.4)
MCV RBC AUTO: 92 FL (ref 82–98)
PLATELET # BLD AUTO: 211 THOUSANDS/UL (ref 149–390)
PMV BLD AUTO: 9.6 FL (ref 8.9–12.7)
POTASSIUM SERPL-SCNC: 3.8 MMOL/L (ref 3.5–5.3)
PROT UR-MCNC: 96 MG/DL
PROT/CREAT UR: 1.52 MG/G{CREAT} (ref 0–0.1)
RBC # BLD AUTO: 3.4 MILLION/UL (ref 3.81–5.12)
SODIUM SERPL-SCNC: 142 MMOL/L (ref 136–145)
WBC # BLD AUTO: 4.16 THOUSAND/UL (ref 4.31–10.16)

## 2017-07-25 PROCEDURE — 85027 COMPLETE CBC AUTOMATED: CPT | Performed by: PHYSICIAN ASSISTANT

## 2017-07-25 PROCEDURE — 83036 HEMOGLOBIN GLYCOSYLATED A1C: CPT | Performed by: PHYSICIAN ASSISTANT

## 2017-07-25 PROCEDURE — 86255 FLUORESCENT ANTIBODY SCREEN: CPT | Performed by: INTERNAL MEDICINE

## 2017-07-25 PROCEDURE — 82948 REAGENT STRIP/BLOOD GLUCOSE: CPT

## 2017-07-25 PROCEDURE — 99284 EMERGENCY DEPT VISIT MOD MDM: CPT

## 2017-07-25 PROCEDURE — 84156 ASSAY OF PROTEIN URINE: CPT | Performed by: INTERNAL MEDICINE

## 2017-07-25 PROCEDURE — 82570 ASSAY OF URINE CREATININE: CPT | Performed by: INTERNAL MEDICINE

## 2017-07-25 PROCEDURE — 80048 BASIC METABOLIC PNL TOTAL CA: CPT | Performed by: PHYSICIAN ASSISTANT

## 2017-07-25 RX ORDER — METOPROLOL SUCCINATE 25 MG/1
25 TABLET, EXTENDED RELEASE ORAL DAILY
COMMUNITY
End: 2018-01-25 | Stop reason: SDUPTHER

## 2017-07-25 RX ORDER — OXYCODONE HYDROCHLORIDE AND ACETAMINOPHEN 5; 325 MG/1; MG/1
1 TABLET ORAL EVERY 6 HOURS PRN
Status: DISCONTINUED | OUTPATIENT
Start: 2017-07-25 | End: 2017-07-25 | Stop reason: HOSPADM

## 2017-07-25 RX ORDER — LIDOCAINE 50 MG/G
1 PATCH TOPICAL DAILY
Status: DISCONTINUED | OUTPATIENT
Start: 2017-07-25 | End: 2017-07-25 | Stop reason: HOSPADM

## 2017-07-25 RX ORDER — MUSCLE RUB CREAM 100; 150 MG/G; MG/G
CREAM TOPICAL 4 TIMES DAILY PRN
Status: DISCONTINUED | OUTPATIENT
Start: 2017-07-25 | End: 2017-07-25 | Stop reason: HOSPADM

## 2017-07-25 RX ORDER — SODIUM CHLORIDE 9 MG/ML
100 INJECTION, SOLUTION INTRAVENOUS CONTINUOUS
Status: DISCONTINUED | OUTPATIENT
Start: 2017-07-25 | End: 2017-07-25 | Stop reason: HOSPADM

## 2017-07-25 RX ORDER — ONDANSETRON 2 MG/ML
4 INJECTION INTRAMUSCULAR; INTRAVENOUS EVERY 4 HOURS PRN
Status: DISCONTINUED | OUTPATIENT
Start: 2017-07-25 | End: 2017-07-25 | Stop reason: HOSPADM

## 2017-07-25 RX ORDER — GABAPENTIN 100 MG/1
100 CAPSULE ORAL 3 TIMES DAILY
Status: DISCONTINUED | OUTPATIENT
Start: 2017-07-25 | End: 2017-07-25 | Stop reason: HOSPADM

## 2017-07-25 RX ORDER — ACETAMINOPHEN 325 MG/1
975 TABLET ORAL EVERY 8 HOURS SCHEDULED
Status: DISCONTINUED | OUTPATIENT
Start: 2017-07-25 | End: 2017-07-25 | Stop reason: HOSPADM

## 2017-07-25 RX ADMIN — SODIUM CHLORIDE 100 ML/HR: 0.9 INJECTION, SOLUTION INTRAVENOUS at 12:42

## 2017-07-25 RX ADMIN — OXYCODONE HYDROCHLORIDE AND ACETAMINOPHEN 1 TABLET: 5; 325 TABLET ORAL at 09:38

## 2017-07-25 RX ADMIN — LIDOCAINE 1 PATCH: 50 PATCH CUTANEOUS at 02:50

## 2017-07-25 RX ADMIN — ONDANSETRON 4 MG: 2 INJECTION INTRAMUSCULAR; INTRAVENOUS at 08:06

## 2017-07-25 RX ADMIN — ACETAMINOPHEN 975 MG: 325 TABLET, FILM COATED ORAL at 02:49

## 2017-07-25 RX ADMIN — ONDANSETRON 4 MG: 2 INJECTION INTRAMUSCULAR; INTRAVENOUS at 02:49

## 2017-07-25 RX ADMIN — SODIUM CHLORIDE 100 ML/HR: 0.9 INJECTION, SOLUTION INTRAVENOUS at 02:52

## 2017-07-25 RX ADMIN — GABAPENTIN 100 MG: 100 CAPSULE ORAL at 02:49

## 2017-07-25 RX ADMIN — GABAPENTIN 100 MG: 100 CAPSULE ORAL at 09:39

## 2017-07-25 RX ADMIN — SODIUM CHLORIDE 1000 ML: 0.9 INJECTION, SOLUTION INTRAVENOUS at 00:14

## 2017-07-25 RX ADMIN — MENTHOL, METHYL SALICYLATE: 10; 15 CREAM TOPICAL at 09:39

## 2017-07-25 RX ADMIN — ACETAMINOPHEN 975 MG: 325 TABLET, FILM COATED ORAL at 13:38

## 2017-07-26 LAB — BACTERIA UR CULT: NORMAL

## 2017-07-28 ENCOUNTER — ALLSCRIPTS OFFICE VISIT (OUTPATIENT)
Dept: OTHER | Facility: OTHER | Age: 47
End: 2017-07-28

## 2017-07-28 LAB
C-ANCA TITR SER IF: NORMAL TITER
MYELOPEROXIDASE AB SER IA-ACNC: <9 U/ML (ref 0–9)
P-ANCA ATYPICAL TITR SER IF: NORMAL TITER
P-ANCA TITR SER IF: NORMAL TITER
PROTEINASE3 AB SER IA-ACNC: <3.5 U/ML (ref 0–3.5)

## 2017-07-29 ENCOUNTER — GENERIC CONVERSION - ENCOUNTER (OUTPATIENT)
Dept: OTHER | Facility: OTHER | Age: 47
End: 2017-07-29

## 2017-08-02 ENCOUNTER — HOSPITAL ENCOUNTER (OUTPATIENT)
Dept: NEUROLOGY | Facility: CLINIC | Age: 47
Discharge: HOME | End: 2017-08-02
Payer: COMMERCIAL

## 2017-08-02 ENCOUNTER — ALLSCRIPTS OFFICE VISIT (OUTPATIENT)
Dept: OTHER | Facility: OTHER | Age: 47
End: 2017-08-02

## 2017-08-02 DIAGNOSIS — R29.898 OTHER SYMPTOMS REFERABLE TO JOINT, SITE UNSPECIFIED: ICD-10-CM

## 2017-08-02 DIAGNOSIS — R20.2 PARESTHESIA OF BOTH FEET: ICD-10-CM

## 2017-08-02 PROCEDURE — 95886 MUSC TEST DONE W/N TEST COMP: CPT

## 2017-08-02 PROCEDURE — 95910 NRV CNDJ TEST 7-8 STUDIES: CPT

## 2017-08-11 ENCOUNTER — LAB CONVERSION - ENCOUNTER (OUTPATIENT)
Dept: OTHER | Facility: OTHER | Age: 47
End: 2017-08-11

## 2017-08-11 LAB
BUN SERPL-MCNC: 35 MG/DL (ref 7–25)
BUN/CREA RATIO (HISTORICAL): 19 (CALC) (ref 6–22)
CALCIUM SERPL-MCNC: 9.1 MG/DL (ref 8.6–10.2)
CHLORIDE SERPL-SCNC: 108 MMOL/L (ref 98–110)
CO2 SERPL-SCNC: 21 MMOL/L (ref 20–31)
CREAT SERPL-MCNC: 1.81 MG/DL (ref 0.5–1.1)
DEPRECATED RDW RBC AUTO: 14.3 % (ref 11–15)
EGFR AFRICAN AMERICAN (HISTORICAL): 38 ML/MIN/1.73M2
EGFR-AMERICAN CALC (HISTORICAL): 33 ML/MIN/1.73M2
GLUCOSE (HISTORICAL): 160 MG/DL (ref 65–99)
HCT VFR BLD AUTO: 38.7 % (ref 35–45)
HGB BLD-MCNC: 12.8 G/DL (ref 11.7–15.5)
MCH RBC QN AUTO: 30 PG (ref 27–33)
MCHC RBC AUTO-ENTMCNC: 33.1 G/DL (ref 32–36)
MCV RBC AUTO: 90.6 FL (ref 80–100)
PLATELET # BLD AUTO: 306 THOUSAND/UL (ref 140–400)
PMV BLD AUTO: 10.8 FL (ref 7.5–12.5)
POTASSIUM SERPL-SCNC: 4.5 MMOL/L (ref 3.5–5.3)
RBC # BLD AUTO: 4.27 MILLION/UL (ref 3.8–5.1)
SODIUM SERPL-SCNC: 137 MMOL/L (ref 135–146)
WBC # BLD AUTO: 6 THOUSAND/UL (ref 3.8–10.8)

## 2017-08-14 DIAGNOSIS — N18.30 CHRONIC KIDNEY DISEASE, STAGE III (MODERATE) (HCC): ICD-10-CM

## 2017-08-23 ENCOUNTER — APPOINTMENT (OUTPATIENT)
Dept: PHYSICAL THERAPY | Facility: CLINIC | Age: 47
End: 2017-08-23
Payer: COMMERCIAL

## 2017-08-23 PROCEDURE — G8991 OTHER PT/OT GOAL STATUS: HCPCS

## 2017-08-23 PROCEDURE — G8990 OTHER PT/OT CURRENT STATUS: HCPCS

## 2017-08-23 PROCEDURE — 97162 PT EVAL MOD COMPLEX 30 MIN: CPT

## 2017-08-30 ENCOUNTER — APPOINTMENT (OUTPATIENT)
Dept: PHYSICAL THERAPY | Facility: CLINIC | Age: 47
End: 2017-08-30
Payer: COMMERCIAL

## 2017-08-30 ENCOUNTER — GENERIC CONVERSION - ENCOUNTER (OUTPATIENT)
Dept: OTHER | Facility: OTHER | Age: 47
End: 2017-08-30

## 2017-08-30 PROCEDURE — 97112 NEUROMUSCULAR REEDUCATION: CPT

## 2017-08-30 PROCEDURE — 97110 THERAPEUTIC EXERCISES: CPT

## 2017-09-06 ENCOUNTER — GENERIC CONVERSION - ENCOUNTER (OUTPATIENT)
Dept: OTHER | Facility: OTHER | Age: 47
End: 2017-09-06

## 2017-09-13 ENCOUNTER — GENERIC CONVERSION - ENCOUNTER (OUTPATIENT)
Dept: OTHER | Facility: OTHER | Age: 47
End: 2017-09-13

## 2017-09-20 ENCOUNTER — LAB REQUISITION (OUTPATIENT)
Dept: LAB | Facility: HOSPITAL | Age: 47
End: 2017-09-20
Payer: COMMERCIAL

## 2017-09-20 ENCOUNTER — ALLSCRIPTS OFFICE VISIT (OUTPATIENT)
Dept: OTHER | Facility: OTHER | Age: 47
End: 2017-09-20

## 2017-09-20 DIAGNOSIS — M25.552 PAIN IN LEFT HIP: ICD-10-CM

## 2017-09-20 DIAGNOSIS — F11.90 UNCOMPLICATED OPIOID USE: ICD-10-CM

## 2017-09-20 DIAGNOSIS — M25.551 PAIN IN RIGHT HIP: ICD-10-CM

## 2017-09-20 DIAGNOSIS — R10.9 ABDOMINAL PAIN: ICD-10-CM

## 2017-09-20 PROCEDURE — 80307 DRUG TEST PRSMV CHEM ANLYZR: CPT | Performed by: PHYSICIAN ASSISTANT

## 2017-09-20 PROCEDURE — 80365 DRUG SCREENING OXYCODONE: CPT | Performed by: PHYSICIAN ASSISTANT

## 2017-09-22 ENCOUNTER — ALLSCRIPTS OFFICE VISIT (OUTPATIENT)
Dept: OTHER | Facility: OTHER | Age: 47
End: 2017-09-22

## 2017-09-22 ENCOUNTER — TRANSCRIBE ORDERS (OUTPATIENT)
Dept: ADMINISTRATIVE | Facility: HOSPITAL | Age: 47
End: 2017-09-22

## 2017-09-22 DIAGNOSIS — R10.10 PAIN OF UPPER ABDOMEN: Primary | ICD-10-CM

## 2017-09-26 ENCOUNTER — APPOINTMENT (EMERGENCY)
Dept: CT IMAGING | Facility: HOSPITAL | Age: 47
End: 2017-09-26
Payer: COMMERCIAL

## 2017-09-26 ENCOUNTER — HOSPITAL ENCOUNTER (EMERGENCY)
Facility: HOSPITAL | Age: 47
Discharge: HOME | End: 2017-09-26
Attending: EMERGENCY MEDICINE | Admitting: EMERGENCY MEDICINE
Payer: COMMERCIAL

## 2017-09-26 VITALS
DIASTOLIC BLOOD PRESSURE: 66 MMHG | HEART RATE: 86 BPM | SYSTOLIC BLOOD PRESSURE: 128 MMHG | TEMPERATURE: 98.2 F | BODY MASS INDEX: 35.85 KG/M2 | OXYGEN SATURATION: 100 % | RESPIRATION RATE: 16 BRPM | WEIGHT: 196 LBS

## 2017-09-26 DIAGNOSIS — R53.83 FATIGUE: Primary | ICD-10-CM

## 2017-09-26 LAB
ALBUMIN SERPL BCP-MCNC: 3.5 G/DL (ref 3.5–5)
ALP SERPL-CCNC: 138 U/L (ref 46–116)
ALT SERPL W P-5'-P-CCNC: 25 U/L (ref 12–78)
ANION GAP SERPL CALCULATED.3IONS-SCNC: 10 MMOL/L (ref 4–13)
AST SERPL W P-5'-P-CCNC: 18 U/L (ref 5–45)
BACTERIA UR QL AUTO: ABNORMAL /HPF
BASOPHILS # BLD AUTO: 0.01 THOUSANDS/ΜL (ref 0–0.1)
BASOPHILS NFR BLD AUTO: 0 % (ref 0–1)
BILIRUB SERPL-MCNC: 0.2 MG/DL (ref 0.2–1)
BILIRUB UR QL STRIP: NEGATIVE
BUN SERPL-MCNC: 35 MG/DL (ref 5–25)
CALCIUM SERPL-MCNC: 9.8 MG/DL (ref 8.3–10.1)
CHLORIDE SERPL-SCNC: 105 MMOL/L (ref 100–108)
CLARITY UR: CLEAR
CO2 SERPL-SCNC: 24 MMOL/L (ref 21–32)
COLOR UR: YELLOW
CREAT SERPL-MCNC: 1.89 MG/DL (ref 0.6–1.3)
EOSINOPHIL # BLD AUTO: 0.03 THOUSAND/ΜL (ref 0–0.61)
EOSINOPHIL NFR BLD AUTO: 1 % (ref 0–6)
ERYTHROCYTE [DISTWIDTH] IN BLOOD BY AUTOMATED COUNT: 14 % (ref 11.6–15.1)
GFR SERPL CREATININE-BSD FRML MDRD: 31 ML/MIN/1.73SQ M
GLUCOSE SERPL-MCNC: 114 MG/DL (ref 65–140)
GLUCOSE UR STRIP-MCNC: NEGATIVE MG/DL
HCT VFR BLD AUTO: 38.7 % (ref 34.8–46.1)
HGB BLD-MCNC: 13.2 G/DL (ref 11.5–15.4)
HGB UR QL STRIP.AUTO: ABNORMAL
KETONES UR STRIP-MCNC: NEGATIVE MG/DL
LACTATE SERPL-SCNC: <0.3 MMOL/L (ref 0.5–2)
LEUKOCYTE ESTERASE UR QL STRIP: ABNORMAL
LYMPHOCYTES # BLD AUTO: 1.09 THOUSANDS/ΜL (ref 0.6–4.47)
LYMPHOCYTES NFR BLD AUTO: 18 % (ref 14–44)
MCH RBC QN AUTO: 30.9 PG (ref 26.8–34.3)
MCHC RBC AUTO-ENTMCNC: 34.1 G/DL (ref 31.4–37.4)
MCV RBC AUTO: 91 FL (ref 82–98)
MONOCYTES # BLD AUTO: 0.49 THOUSAND/ΜL (ref 0.17–1.22)
MONOCYTES NFR BLD AUTO: 8 % (ref 4–12)
NEUTROPHILS # BLD AUTO: 4.46 THOUSANDS/ΜL (ref 1.85–7.62)
NEUTS SEG NFR BLD AUTO: 73 % (ref 43–75)
NITRITE UR QL STRIP: NEGATIVE
NON-SQ EPI CELLS URNS QL MICRO: ABNORMAL /HPF
NRBC BLD AUTO-RTO: 0 /100 WBCS
OXYCODONE UR QL CFM: 348 NG/ML
OXYCODONE+OXYMORPHONE SERPLBLD QL SCN: POSITIVE
OXYCODONE+OXYMORPHONE UR QL SCN: POSITIVE
OXYMORPHONE UR CFM-MCNC: 365 NG/ML
OXYMORPHONE UR QL CFM: POSITIVE
PH UR STRIP.AUTO: 5.5 [PH] (ref 4.5–8)
PLATELET # BLD AUTO: 284 THOUSANDS/UL (ref 149–390)
PMV BLD AUTO: 10 FL (ref 8.9–12.7)
POTASSIUM SERPL-SCNC: 4.6 MMOL/L (ref 3.5–5.3)
PROT SERPL-MCNC: 7.9 G/DL (ref 6.4–8.2)
PROT UR STRIP-MCNC: >=300 MG/DL
RBC # BLD AUTO: 4.27 MILLION/UL (ref 3.81–5.12)
RBC #/AREA URNS AUTO: ABNORMAL /HPF
SODIUM SERPL-SCNC: 139 MMOL/L (ref 136–145)
SP GR UR STRIP.AUTO: 1.02 (ref 1–1.03)
TSH SERPL DL<=0.05 MIU/L-ACNC: 0.63 UIU/ML (ref 0.36–3.74)
UROBILINOGEN UR QL STRIP.AUTO: 0.2 E.U./DL
WBC # BLD AUTO: 6.08 THOUSAND/UL (ref 4.31–10.16)
WBC #/AREA URNS AUTO: ABNORMAL /HPF

## 2017-09-26 PROCEDURE — 87086 URINE CULTURE/COLONY COUNT: CPT

## 2017-09-26 PROCEDURE — 99284 EMERGENCY DEPT VISIT MOD MDM: CPT

## 2017-09-26 PROCEDURE — 96360 HYDRATION IV INFUSION INIT: CPT

## 2017-09-26 PROCEDURE — 87186 SC STD MICRODIL/AGAR DIL: CPT

## 2017-09-26 PROCEDURE — 70450 CT HEAD/BRAIN W/O DYE: CPT

## 2017-09-26 PROCEDURE — 80053 COMPREHEN METABOLIC PANEL: CPT | Performed by: EMERGENCY MEDICINE

## 2017-09-26 PROCEDURE — 84443 ASSAY THYROID STIM HORMONE: CPT | Performed by: EMERGENCY MEDICINE

## 2017-09-26 PROCEDURE — 83605 ASSAY OF LACTIC ACID: CPT | Performed by: EMERGENCY MEDICINE

## 2017-09-26 PROCEDURE — 87077 CULTURE AEROBIC IDENTIFY: CPT

## 2017-09-26 PROCEDURE — 85025 COMPLETE CBC W/AUTO DIFF WBC: CPT | Performed by: EMERGENCY MEDICINE

## 2017-09-26 PROCEDURE — 36415 COLL VENOUS BLD VENIPUNCTURE: CPT | Performed by: EMERGENCY MEDICINE

## 2017-09-26 PROCEDURE — 81002 URINALYSIS NONAUTO W/O SCOPE: CPT | Performed by: EMERGENCY MEDICINE

## 2017-09-26 PROCEDURE — 81001 URINALYSIS AUTO W/SCOPE: CPT

## 2017-09-26 RX ADMIN — SODIUM CHLORIDE 1000 ML: 0.9 INJECTION, SOLUTION INTRAVENOUS at 13:15

## 2017-09-27 ENCOUNTER — GENERIC CONVERSION - ENCOUNTER (OUTPATIENT)
Dept: OTHER | Facility: OTHER | Age: 47
End: 2017-09-27

## 2017-09-28 LAB
AMPHETAMINES UR QL SCN: NEGATIVE NG/ML
BACTERIA UR CULT: NORMAL
BARBITURATES UR QL SCN: NEGATIVE NG/ML
BENZODIAZ UR QL SCN: NEGATIVE NG/ML
BZE UR QL SCN: NEGATIVE NG/ML
CANNABINOIDS UR QL SCN: POSITIVE
METHADONE UR QL SCN: NEGATIVE NG/ML
OPIATES UR QL: NEGATIVE
PCP UR QL: NEGATIVE NG/ML
PROPOXYPH UR QL: NEGATIVE NG/ML

## 2017-10-01 ENCOUNTER — GENERIC CONVERSION - ENCOUNTER (OUTPATIENT)
Dept: OTHER | Facility: OTHER | Age: 47
End: 2017-10-01

## 2017-10-16 DIAGNOSIS — N18.30 CHRONIC KIDNEY DISEASE, STAGE III (MODERATE) (HCC): ICD-10-CM

## 2017-10-24 ENCOUNTER — HOSPITAL ENCOUNTER (EMERGENCY)
Facility: HOSPITAL | Age: 47
Discharge: HOME | End: 2017-10-25
Attending: EMERGENCY MEDICINE | Admitting: EMERGENCY MEDICINE
Payer: COMMERCIAL

## 2017-10-24 DIAGNOSIS — K29.70 GASTRITIS: ICD-10-CM

## 2017-10-24 DIAGNOSIS — R10.10 UPPER ABDOMINAL PAIN: Primary | ICD-10-CM

## 2017-10-24 DIAGNOSIS — R79.89 ELEVATED SERUM CREATININE: ICD-10-CM

## 2017-10-24 PROCEDURE — 36415 COLL VENOUS BLD VENIPUNCTURE: CPT | Performed by: EMERGENCY MEDICINE

## 2017-10-24 PROCEDURE — 85025 COMPLETE CBC W/AUTO DIFF WBC: CPT | Performed by: EMERGENCY MEDICINE

## 2017-10-24 PROCEDURE — 96374 THER/PROPH/DIAG INJ IV PUSH: CPT

## 2017-10-24 PROCEDURE — 96375 TX/PRO/DX INJ NEW DRUG ADDON: CPT

## 2017-10-24 PROCEDURE — 85730 THROMBOPLASTIN TIME PARTIAL: CPT | Performed by: EMERGENCY MEDICINE

## 2017-10-24 PROCEDURE — 80053 COMPREHEN METABOLIC PANEL: CPT | Performed by: EMERGENCY MEDICINE

## 2017-10-24 PROCEDURE — 96361 HYDRATE IV INFUSION ADD-ON: CPT

## 2017-10-24 PROCEDURE — 83690 ASSAY OF LIPASE: CPT | Performed by: EMERGENCY MEDICINE

## 2017-10-24 PROCEDURE — 85610 PROTHROMBIN TIME: CPT | Performed by: EMERGENCY MEDICINE

## 2017-10-24 RX ORDER — MORPHINE SULFATE 4 MG/ML
4 INJECTION, SOLUTION INTRAMUSCULAR; INTRAVENOUS ONCE
Status: COMPLETED | OUTPATIENT
Start: 2017-10-24 | End: 2017-10-24

## 2017-10-24 RX ADMIN — MORPHINE SULFATE 4 MG: 4 INJECTION, SOLUTION INTRAMUSCULAR; INTRAVENOUS at 23:57

## 2017-10-24 RX ADMIN — FAMOTIDINE 20 MG: 10 INJECTION, SOLUTION INTRAVENOUS at 23:53

## 2017-10-24 RX ADMIN — SODIUM CHLORIDE 1000 ML: 0.9 INJECTION, SOLUTION INTRAVENOUS at 23:53

## 2017-10-25 ENCOUNTER — APPOINTMENT (EMERGENCY)
Dept: CT IMAGING | Facility: HOSPITAL | Age: 47
End: 2017-10-25
Payer: COMMERCIAL

## 2017-10-25 VITALS
RESPIRATION RATE: 18 BRPM | TEMPERATURE: 98.3 F | DIASTOLIC BLOOD PRESSURE: 82 MMHG | HEART RATE: 64 BPM | OXYGEN SATURATION: 98 % | SYSTOLIC BLOOD PRESSURE: 137 MMHG

## 2017-10-25 LAB
ALBUMIN SERPL BCP-MCNC: 3.3 G/DL (ref 3.5–5)
ALP SERPL-CCNC: 149 U/L (ref 46–116)
ALT SERPL W P-5'-P-CCNC: 24 U/L (ref 12–78)
ANION GAP SERPL CALCULATED.3IONS-SCNC: 7 MMOL/L (ref 4–13)
APTT PPP: 30 SECONDS (ref 23–35)
AST SERPL W P-5'-P-CCNC: 17 U/L (ref 5–45)
BASOPHILS # BLD AUTO: 0.01 THOUSANDS/ΜL (ref 0–0.1)
BASOPHILS NFR BLD AUTO: 0 % (ref 0–1)
BILIRUB SERPL-MCNC: 0.19 MG/DL (ref 0.2–1)
BUN SERPL-MCNC: 43 MG/DL (ref 5–25)
CALCIUM SERPL-MCNC: 8.7 MG/DL (ref 8.3–10.1)
CHLORIDE SERPL-SCNC: 107 MMOL/L (ref 100–108)
CO2 SERPL-SCNC: 25 MMOL/L (ref 21–32)
CREAT SERPL-MCNC: 2.35 MG/DL (ref 0.6–1.3)
EOSINOPHIL # BLD AUTO: 0.03 THOUSAND/ΜL (ref 0–0.61)
EOSINOPHIL NFR BLD AUTO: 1 % (ref 0–6)
ERYTHROCYTE [DISTWIDTH] IN BLOOD BY AUTOMATED COUNT: 13.9 % (ref 11.6–15.1)
GFR SERPL CREATININE-BSD FRML MDRD: 24 ML/MIN/1.73SQ M
GLUCOSE SERPL-MCNC: 125 MG/DL (ref 65–140)
HCT VFR BLD AUTO: 36.5 % (ref 34.8–46.1)
HGB BLD-MCNC: 12.5 G/DL (ref 11.5–15.4)
INR PPP: 0.91 (ref 0.86–1.16)
LIPASE SERPL-CCNC: 413 U/L (ref 73–393)
LYMPHOCYTES # BLD AUTO: 2.01 THOUSANDS/ΜL (ref 0.6–4.47)
LYMPHOCYTES NFR BLD AUTO: 33 % (ref 14–44)
MCH RBC QN AUTO: 31.3 PG (ref 26.8–34.3)
MCHC RBC AUTO-ENTMCNC: 34.2 G/DL (ref 31.4–37.4)
MCV RBC AUTO: 92 FL (ref 82–98)
MONOCYTES # BLD AUTO: 0.59 THOUSAND/ΜL (ref 0.17–1.22)
MONOCYTES NFR BLD AUTO: 10 % (ref 4–12)
NEUTROPHILS # BLD AUTO: 3.53 THOUSANDS/ΜL (ref 1.85–7.62)
NEUTS SEG NFR BLD AUTO: 56 % (ref 43–75)
NRBC BLD AUTO-RTO: 0 /100 WBCS
PLATELET # BLD AUTO: 248 THOUSANDS/UL (ref 149–390)
PMV BLD AUTO: 9.9 FL (ref 8.9–12.7)
POTASSIUM SERPL-SCNC: 4.3 MMOL/L (ref 3.5–5.3)
PROT SERPL-MCNC: 7.4 G/DL (ref 6.4–8.2)
PROTHROMBIN TIME: 12.3 SECONDS (ref 12.1–14.4)
RBC # BLD AUTO: 3.99 MILLION/UL (ref 3.81–5.12)
SODIUM SERPL-SCNC: 139 MMOL/L (ref 136–145)
WBC # BLD AUTO: 6.17 THOUSAND/UL (ref 4.31–10.16)

## 2017-10-25 PROCEDURE — 96375 TX/PRO/DX INJ NEW DRUG ADDON: CPT

## 2017-10-25 PROCEDURE — 96376 TX/PRO/DX INJ SAME DRUG ADON: CPT

## 2017-10-25 PROCEDURE — 99284 EMERGENCY DEPT VISIT MOD MDM: CPT

## 2017-10-25 PROCEDURE — 96361 HYDRATE IV INFUSION ADD-ON: CPT

## 2017-10-25 PROCEDURE — 74176 CT ABD & PELVIS W/O CONTRAST: CPT

## 2017-10-25 RX ORDER — ONDANSETRON 2 MG/ML
4 INJECTION INTRAMUSCULAR; INTRAVENOUS ONCE
Status: COMPLETED | OUTPATIENT
Start: 2017-10-25 | End: 2017-10-25

## 2017-10-25 RX ORDER — MORPHINE SULFATE 4 MG/ML
4 INJECTION, SOLUTION INTRAMUSCULAR; INTRAVENOUS ONCE
Status: COMPLETED | OUTPATIENT
Start: 2017-10-25 | End: 2017-10-25

## 2017-10-25 RX ORDER — SUCRALFATE ORAL 1 G/10ML
1000 SUSPENSION ORAL ONCE
Status: COMPLETED | OUTPATIENT
Start: 2017-10-25 | End: 2017-10-25

## 2017-10-25 RX ADMIN — ONDANSETRON 4 MG: 2 INJECTION INTRAMUSCULAR; INTRAVENOUS at 02:04

## 2017-10-25 RX ADMIN — SUCRALFATE 1000 MG: 1 SUSPENSION ORAL at 03:11

## 2017-10-25 RX ADMIN — MORPHINE SULFATE 4 MG: 4 INJECTION, SOLUTION INTRAMUSCULAR; INTRAVENOUS at 01:40

## 2017-10-25 RX ADMIN — IOHEXOL 50 ML: 240 INJECTION, SOLUTION INTRATHECAL; INTRAVASCULAR; INTRAVENOUS; ORAL at 00:30

## 2017-10-25 NOTE — ED PROVIDER NOTES
History  Chief Complaint   Patient presents with    Abdominal Pain     Pt reports epigastric pain since 0630 this morning denies NVD, reports Hx pancreatitis with similar symptoms, pt also reports abdominal distention increasing over "a few days"  54 yo female with epigastric pain since 0630 - pain worsens with food or drink  Similar to previous bouts of pancreatitis related to her MPA  History provided by:  Patient and EMS personnel   used: No    Abdominal Pain   Pain location:  Epigastric  Pain quality: aching    Pain radiates to:  Does not radiate  Pain severity:  Severe  Onset quality:  Gradual  Duration:  17 hours  Timing:  Constant  Progression:  Waxing and waning  Chronicity:  Recurrent  Relieved by:  Nothing  Worsened by: Movement, palpation and eating  Ineffective treatments:  None tried  Associated symptoms: anorexia    Associated symptoms: no chest pain, no chills, no constipation, no cough, no diarrhea, no dysuria, no fatigue, no fever, no hematuria, no nausea, no shortness of breath, no sore throat, no vaginal bleeding, no vaginal discharge and no vomiting    Risk factors: no alcohol abuse and has not had multiple surgeries        Prior to Admission Medications   Prescriptions Last Dose Informant Patient Reported? Taking?    amphetamine-dextroamphetamine (ADDERALL) 20 mg tablet   Yes No   Sig: Take 10 mg by mouth 2 (two) times a day   atorvastatin (LIPITOR) 20 mg tablet   Yes No   Sig: Take 20 mg by mouth daily   gabapentin (NEURONTIN) 100 mg capsule   Yes No   Sig: Take 100 mg by mouth 3 (three) times a day   insulin aspart (NovoLOG) 100 units/mL injection   Yes No   Sig: Inject under the skin 3 (three) times a day before meals   insulin glargine (LANTUS) 100 units/mL subcutaneous injection   Yes No   Sig: Inject 22 Units under the skin daily at bedtime   metoprolol succinate (TOPROL-XL) 25 mg 24 hr tablet   Yes No   Sig: Take 25 mg by mouth daily   ondansetron (ZOFRAN) 4 mg tablet   Yes No   Sig: Take 4 mg by mouth every 8 (eight) hours as needed for nausea or vomiting   oxyCODONE-acetaminophen (PERCOCET) 7 5-325 MG per tablet   Yes No   Sig: Take 1 tablet by mouth every 6 (six) hours as needed for moderate pain      Facility-Administered Medications: None       Past Medical History:   Diagnosis Date    Anemia of chronic disease     Anxiety     Asthma     Asthma     Chronic abdominal pain     CKD (chronic kidney disease) stage 3, GFR 30-59 ml/min     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[place] [address] )     Cushing syndrome ([place] [address] )     Diabetes mellitus ([place] [address] )     DVT (deep venous thrombosis) ([place] [address] )     History of acute pancreatitis     felt secondary to Bactrim    HTN (hypertension)     Hyperlipidemia     Hypertension     Microscopic polyangiitis (HCC)     MPA (microscopic polyangiitis) (HCC)     Renal disorder     Self-inflicted injury     self inflicted skin wounds    Wegener's granulomatosis with renal involvement ([place] [address] ) 2015       Past Surgical History:   Procedure Laterality Date    ESOPHAGOGASTRODUODENOSCOPY  09/11/2015    mild antral gastritis       Family History   Problem Relation Age of Onset    Colon cancer Neg Hx      I have reviewed and agree with the history as documented  Social History   Substance Use Topics    Smoking status: Former Smoker     Types: Cigarettes     Quit date: 2010    Smokeless tobacco: Never Used    Alcohol use No        Review of Systems   Constitutional: Negative for appetite change, chills, fatigue and fever  HENT: Negative for congestion and sore throat  Eyes: Negative for visual disturbance  Respiratory: Negative for cough and shortness of breath  Cardiovascular: Negative for chest pain  Gastrointestinal: Positive for abdominal pain (epigastric pain) and anorexia  Negative for constipation, diarrhea, nausea and vomiting  Genitourinary: Negative for dysuria, frequency, hematuria, vaginal bleeding and vaginal discharge  Musculoskeletal: Negative for back pain, neck pain and neck stiffness  Skin: Negative for pallor and rash  Allergic/Immunologic: Negative for immunocompromised state  Neurological: Negative for light-headedness and headaches  Psychiatric/Behavioral: Negative for confusion  All other systems reviewed and are negative  Physical Exam  ED Triage Vitals [10/24/17 7575]   Temperature Pulse Respirations Blood Pressure SpO2   98 3 °F (36 8 °C) 89 20 139/81 96 %      Temp Source Heart Rate Source Patient Position - Orthostatic VS BP Location FiO2 (%)   Oral Monitor Lying Left arm --      Pain Score       8           Physical Exam   Constitutional: She is oriented to person, place, and time  She appears well-developed and well-nourished  No distress  HENT:   Head: Normocephalic and atraumatic  Mouth/Throat: Oropharynx is clear and moist    Eyes: EOM are normal  Pupils are equal, round, and reactive to light  Neck: Normal range of motion  Neck supple  Cardiovascular: Normal rate and regular rhythm  No murmur heard  Pulmonary/Chest: Effort normal and breath sounds normal  No respiratory distress  Abdominal: Soft  Bowel sounds are normal  There is tenderness (moderate epigastric tenderness)  Musculoskeletal: Normal range of motion  Neurological: She is alert and oriented to person, place, and time  Skin: Skin is warm  No rash noted  No pallor  Psychiatric: She has a normal mood and affect  Her behavior is normal    Nursing note and vitals reviewed        ED Medications  Medications   sodium chloride 0 9 % bolus 1,000 mL (0 mL Intravenous Stopped 10/25/17 0138)   morphine (PF) 4 mg/mL injection 4 mg (4 mg Intravenous Given 10/24/17 2357)   famotidine (PEPCID) injection 20 mg (20 mg Intravenous Given 10/24/17 2353)   morphine (PF) 4 mg/mL injection 4 mg (4 mg Intravenous Given 10/25/17 0140)   ondansetron (ZOFRAN) injection 4 mg (4 mg Intravenous Given 10/25/17 0204)   iohexol (OMNIPAQUE) 240 MG/ML solution 50 mL (50 mL Oral Given 10/25/17 0030)   sucralfate (CARAFATE) oral suspension 1,000 mg (1,000 mg Oral Given 10/25/17 0311)       Diagnostic Studies  Labs Reviewed   COMPREHENSIVE METABOLIC PANEL - Abnormal        Result Value Ref Range Status    BUN 43 (*) 5 - 25 mg/dL Final    Creatinine 2 35 (*) 0 60 - 1 30 mg/dL Final    Comment: Standardized to IDMS reference method    Alkaline Phosphatase 149 (*) 46 - 116 U/L Final    Albumin 3 3 (*) 3 5 - 5 0 g/dL Final    Total Bilirubin 0 19 (*) 0 20 - 1 00 mg/dL Final    Sodium 139  136 - 145 mmol/L Final    Potassium 4 3  3 5 - 5 3 mmol/L Final    Comment: Slightly Hemolyzed; Results May be Affected    Chloride 107  100 - 108 mmol/L Final    CO2 25  21 - 32 mmol/L Final    Anion Gap 7  4 - 13 mmol/L Final    Glucose 125  65 - 140 mg/dL Final    Comment:   If the patient is fasting, the ADA then defines impaired fasting glucose as > 100 mg/dL and diabetes as > or equal to 123 mg/dL  Specimen collection should occur prior to Sulfasalazine administration due to the potential for falsely depressed results  Specimen collection should occur prior to Sulfapyridine administration due to the potential for falsely elevated results  Calcium 8 7  8 3 - 10 1 mg/dL Final    AST 17  5 - 45 U/L Final    Comment: 6  Specimen collection should occur prior to Sulfasalazine administration due to the potential for falsely depressed results  ALT 24  12 - 78 U/L Final    Comment:   Specimen collection should occur prior to Sulfasalazine administration due to the potential for falsely depressed results  Total Protein 7 4  6 4 - 8 2 g/dL Final    eGFR 24  ml/min/1 73sq m Final    Narrative:     National Kidney Disease Education Program recommendations are as follows:  GFR calculation is accurate only with a steady state creatinine  Chronic Kidney disease less than 60 ml/min/1 73 sq  meters  Kidney failure less than 15 ml/min/1 73 sq  meters     LIPASE - Abnormal Lipase 413 (*) 73 - 393 u/L Final   CBC AND DIFFERENTIAL - Normal    WBC 6 17  4 31 - 10 16 Thousand/uL Final    RBC 3 99  3 81 - 5 12 Million/uL Final    Hemoglobin 12 5  11 5 - 15 4 g/dL Final    Hematocrit 36 5  34 8 - 46 1 % Final    MCV 92  82 - 98 fL Final    MCH 31 3  26 8 - 34 3 pg Final    MCHC 34 2  31 4 - 37 4 g/dL Final    RDW 13 9  11 6 - 15 1 % Final    MPV 9 9  8 9 - 12 7 fL Final    Platelets 009  268 - 390 Thousands/uL Final    nRBC 0  /100 WBCs Final    Neutrophils Relative 56  43 - 75 % Final    Lymphocytes Relative 33  14 - 44 % Final    Monocytes Relative 10  4 - 12 % Final    Eosinophils Relative 1  0 - 6 % Final    Basophils Relative 0  0 - 1 % Final    Neutrophils Absolute 3 53  1 85 - 7 62 Thousands/µL Final    Lymphocytes Absolute 2 01  0 60 - 4 47 Thousands/µL Final    Monocytes Absolute 0 59  0 17 - 1 22 Thousand/µL Final    Eosinophils Absolute 0 03  0 00 - 0 61 Thousand/µL Final    Basophils Absolute 0 01  0 00 - 0 10 Thousands/µL Final   PROTIME-INR - Normal    Protime 12 3  12 1 - 14 4 seconds Final    INR 0 91  0 86 - 1 16 Final   APTT - Normal    PTT 30  23 - 35 seconds Final    Narrative: Therapeutic Heparin Range = 60-90 seconds       CT abdomen pelvis wo contrast    (Results Pending)       Procedures  Procedures      Phone Contacts  ED Phone Contact    ED Course  ED Course as of Oct 25 0448   Tue Oct 24, 2017   2329 Pt seen and examined  54 yo female with epigastric pain since 0630 - pain worsens with food or drink  Similar to previous bouts of pancreatitis related to her MPA  Will give IVF, morphine, pepcid, check labs, CT a/p  Wed Oct 25, 2017   0031 Lipase 413, Creat 2 35 up from 1 89 4 weeks ago  Pt will drink for CT a/p     0302 CT a/p neg for acute findings  Will d/c home for f/u with PCP and GI (has one who just put her on carafate for gastritis)                                  [facility]  CritCare Time    Disposition  Final diagnoses:   Upper abdominal pain Gastritis   Elevated serum creatinine     ED Disposition     ED Disposition Condition Comment    Discharge  Shanique Chill discharge to home/self care  Condition at discharge: Good        Follow-up Information     Follow up With Specialties Details Why Contact Info    Corine Baker PA-C Family Medicine Call  [facility]  [place] [address] [place] [address],[address] [facility] [address]  [phone]          Discharge Medication List as of 10/25/2017  3:08 AM      CONTINUE these medications which have NOT CHANGED    Details   amphetamine-dextroamphetamine (ADDERALL) 20 mg tablet Take 10 mg by mouth 2 (two) times a day, Historical Med      atorvastatin (LIPITOR) 20 mg tablet Take 20 mg by mouth daily, Historical Med      gabapentin (NEURONTIN) 100 mg capsule Take 100 mg by mouth 3 (three) times a day, Historical Med      insulin aspart (NovoLOG) 100 units/mL injection Inject under the skin 3 (three) times a day before meals, Historical Med      insulin glargine (LANTUS) 100 units/mL subcutaneous injection Inject 22 Units under the skin daily at bedtime, Historical Med      metoprolol succinate (TOPROL-XL) 25 mg 24 hr tablet Take 25 mg by mouth daily, Historical Med      ondansetron (ZOFRAN) 4 mg tablet Take 4 mg by mouth every 8 (eight) hours as needed for nausea or vomiting, Historical Med      oxyCODONE-acetaminophen (PERCOCET) 7 5-325 MG per tablet Take 1 tablet by mouth every 6 (six) hours as needed for moderate pain, Historical Med           No discharge procedures on file      ED Provider  Electronically Signed by       Shankar Lucas DO  10/25/17 7706

## 2017-10-25 NOTE — DISCHARGE INSTRUCTIONS
Abdominal Pain   WHAT YOU NEED TO KNOW:   Abdominal pain can be dull, achy, or sharp  You may have pain in one area of your abdomen, or in your entire abdomen  Your pain may be caused by a condition such as constipation, food sensitivity or poisoning, infection, or a blockage  Abdominal pain can also be from a hernia, appendicitis, or an ulcer  Liver, gallbladder, or kidney conditions can also cause abdominal pain  The cause of your abdominal pain may be unknown  DISCHARGE INSTRUCTIONS:   Return to the emergency department if:   · You have new chest pain or shortness of breath  · You have pulsing pain in your upper abdomen or lower back that suddenly becomes constant  · Your pain is in the right lower abdominal area and worsens with movement  · You have a fever over 100 4°F (38°C) or shaking chills  · You are vomiting and cannot keep food or liquids down  · Your pain does not improve or gets worse over the next 8 to 12 hours  · You see blood in your vomit or bowel movements, or they look black and tarry  · Your skin or the whites of your eyes turn yellow  · You are a woman and have a large amount of vaginal bleeding that is not your monthly period  Contact your healthcare provider if:   · You have pain in your lower back  · You are a man and have pain in your testicles  · You have pain when you urinate  · You have questions or concerns about your condition or care  Follow up with your healthcare provider within 24 hours or as directed:  Write down your questions so you remember to ask them during your visits  Medicines:   · Medicines  may be given to calm your stomach and prevent vomiting or to decrease pain  Ask how to take pain medicine safely  · Take your medicine as directed  Contact your healthcare provider if you think your medicine is not helping or if you have side effects  Tell him of her if you are allergic to any medicine   Keep a list of the medicines, vitamins, and herbs you take  Include the amounts, and when and why you take them  Bring the list or the pill bottles to follow-up visits  Carry your medicine list with you in case of an emergency  © 2017 [employer] Information is for End User's use only and may not be sold, redistributed or otherwise used for commercial purposes  All illustrations and images included in CareNotes® are the copyrighted property of [employer]  or [employer]  The above information is an  only  It is not intended as medical advice for individual conditions or treatments  Talk to your doctor, nurse or pharmacist before following any medical regimen to see if it is safe and effective for you  Gastritis   WHAT YOU NEED TO KNOW:   Gastritis is inflammation or irritation of the lining of your stomach  DISCHARGE INSTRUCTIONS:   Call 911 for any of the following:   · You develop chest pain or shortness of breath  Seek care immediately if:   · You vomit blood  · You have black or bloody bowel movements  · You have severe stomach or back pain  Contact your healthcare provider if:   · You have a fever  · You have new or worsening symptoms, even after treatment  · You have questions or concerns about your condition or care  Medicines:   · Medicines  may be given to help treat a bacterial infection or decrease stomach acid  · Take your medicine as directed  Contact your healthcare provider if you think your medicine is not helping or if you have side effects  Tell him or her if you are allergic to any medicine  Keep a list of the medicines, vitamins, and herbs you take  Include the amounts, and when and why you take them  Bring the list or the pill bottles to follow-up visits  Carry your medicine list with you in case of an emergency  Manage or prevent gastritis:   · Do not smoke    Nicotine and other chemicals in cigarettes and cigars can make your symptoms worse and cause lung damage  Ask your healthcare provider for information if you currently smoke and need help to quit  E-cigarettes or smokeless tobacco still contain nicotine  Talk to your healthcare provider before you use these products  · Do not drink alcohol  Alcohol can prevent healing and make your gastritis worse  Talk to your healthcare provider if you need help to stop drinking  · Do not take NSAIDs or aspirin unless directed  These and similar medicines can cause irritation  If your healthcare provider says it is okay to take NSAIDs, take them with food  · Do not eat foods that cause irritation  Foods such as oranges and salsa can cause burning or pain  Eat a variety of healthy foods  Examples include fruits (not citrus), vegetables, low-fat dairy products, beans, whole-grain breads, and lean meats and fish  Try to eat small meals, and drink water with your meals  Do not eat for at least 3 hours before you go to bed  · Find ways to relax and decrease stress  Stress can increase stomach acid and make gastritis worse  Activities such as yoga, meditation, or listening to music can help you relax  Spend time with friends, or do things you enjoy  Follow up with your healthcare provider as directed: You may need ongoing tests or treatment, or referral to a gastroenterologist  Write down your questions so you remember to ask them during your visits  © 2017 [employer]  Information is for End User's use only and may not be sold, redistributed or otherwise used for commercial purposes  All illustrations and images included in CareNotes® are the copyrighted property of [employer]  or [employer] Mat[employer]  The above information is an  only  It is not intended as medical advice for individual conditions or treatments  Talk to your doctor, nurse or pharmacist before following any medical regimen to see if it is safe and effective for you

## 2017-10-25 NOTE — ED NOTES
Pt lying on stretcher using cell phone, appears comfortable, respirations even and unlabored, no apparent distress  Reports improvement of pain       Tesha Roberts RN  10/25/17 7820

## 2017-10-27 NOTE — CONSULTS
Assessment  1  Abdominal pain (789 00) (R10 9)  2  Acute gastritis (535 00) (K29 00)  3  Constipation (564 00) (K59 00)  4  Blood in stool (578 1) (K92 1)    Plan  Abdominal pain, Acid reflux, Acute gastritis    · Start: Carafate 1 GM/10ML Oral Suspension; TAKE 10 ML 3 TIMES DAILY  Rx By: Sabine Restrepo; Dispense: 90 Days ; #:2700 ML; Refill: 3;For: Abdominal pain, Acid   reflux, Acute gastritis; DEBORAH = N; Sent To: [facility]  Constipation    · Start: Relistor 150 MG Oral Tablet; TAKE 3 TABLET Daily  Rx By: Sabine Restrepo; Dispense: 30 Days ; #:1 X 90 Tablet Bottle; Refill: 3;For:   Constipation; DEBORAH = N; Verified Transmission to [place] [id number]; Last   Updated By: [employer]; 9/22/2017 12:16:39 PM    Discussion/Summary  Discussion Summary:   Epigastric pain differential diagnosis include biliary in origin versus pancreatic versus gastritis/ulcerscheck up on hepatic function rule out celiac disease and events he perform EGD to rule out H pyloricheck lipase levels of Carafateultrasound of the right upper quadrant to make sure this is not biliary in originfor EGD evaluation after clear from her primary care doctor with a follow-up in 3 months  with Marinol which worked in the past requesting this at this time  Discussed that we did not prescribed Marinol has you need special for med for this which I do not have  Deferred patient to insurance company to find a provider that can help her out with this  bleeding likely hemorrhoidal in setting of straining  Patient will benefit from treating her constipation to see if this resolves  It does not resolve will likely need a colonoscopy evaluation which was recently done the last 2-3 years  Will try Relistor for constipation symptoms for better control as she is on chronic narcotics  hold off endoscopic evaluation at this time due chest discomfort and right-sided tinglingness        Chief Complaint  Chief Complaint Free Text Note Form: consult for allison stomach pain, constipation ref by Dr Peter Valentine      History of Present Illness  HPI: Patient is a 51-year-old female presents here for evaluation of epigastric pain associated with oral intake, history of vasculitis complicated by GI bleeds and constipation induced by narcotics  has longstanding constipation associated narcotics which seems to be not under well control  She has tried multiple different medications including MiraLax without any relief  She also has likely component of overflow diarrhea associated with this  She also has fecal incontinence due to poor control of her bowels  Last fecal incontinence was earlier yesterday but has not had fecal incontinence and several months prior to this  primary chief complaints regarding epigastric pain which is sharp pain located and focal to epigastric region associated with oral intake  She does have history of pancreatitis in the past secondary to vasculitis but has not had any admissions recently for this  She also had previous EGDs was not identify the clear cause of epigastric pain  She has not had an EGD in over 1-2 years  Currently pain seems to be getting worse associated with decreased oral intake with pain ranging anywhere from 6 to 8/10  She also has persistent nausea previously taking Marinol currently on Zofran  She is requesting Marinol unfortunately this cannot be prescribed by me as I do not have a license to do this  not on any medication for vasculitis since her ankles are within normal limits  Azathioprine was used in the past for this  Hemoglobin is also within normal limits and no signs of overt bleeding based on history and hemoglobin from May of 2017  would like her to follow up with the primary care doctor immediately due to history of right-sided tingling as of the face and chest discomfort with radiation of pain to the left arm which is intermittent in nature        Review of Systems  Complete-Female GI Adult:   Constitutional: No fever, no chills, feels well, no tiredness, no recent weight gain or weight loss  Eyes: No complaints of eye pain, no red eyes, no eyesight problems, no discharge, no dry eyes, no itching of eyes  ENT: no complaints of earache, no loss of hearing, no nose bleeds, no nasal discharge, no sore throat, no hoarseness  Cardiovascular: No complaints of slow heart rate, no fast heart rate, no chest pain, no palpitations, no leg claudication, no lower extremity edema  Respiratory: No complaints of shortness of breath, no wheezing, no cough, no SOB on exertion, no orthopnea, no PND  Gastrointestinal: abdominal pain,-- nausea,-- vomiting,-- constipation-- and-- diarrhea, but-- No complaints of abdominal pain, no constipation, no nausea or vomiting, no diarrhea, no bloody stools  Genitourinary: No complaints of dysuria, no incontinence, no pelvic pain, no dysmenorrhea, no vaginal discharge or bleeding  Musculoskeletal: No complaints of arthralgias, no myalgias, no joint swelling or stiffness, no limb pain or swelling  Integumentary: No complaints of skin rash or lesions, no itching, no skin wounds, no breast pain or lump  Neurological: No complaints of headache, no confusion, no convulsions, no numbness, no dizziness or fainting, no tingling, no limb weakness, no difficulty walking  Psychiatric: Not suicidal, no sleep disturbance, no anxiety or depression, no change in personality, no emotional problems  Endocrine: No complaints of proptosis, no hot flashes, no muscle weakness, no deepening of the voice, no feelings of weakness  Hematologic/Lymphatic: No complaints of swollen glands, no swollen glands in the neck, does not bleed easily, does not bruise easily  ROS Reviewed:   ROS reviewed  Active Problems  1  Abdominal pain (789 00) (R10 9)  2  Abnormal blood chemistry (790 6) (R79 9)  3  Acid reflux (530 81) (K21 9)  4  Acute gastritis (535 00) (K29 00)  5  Acute kidney injury (584 9) (N17 9)  6  Anemia of chronic disease (285 29) (D63 8)  7  Arthralgia of multiple joints (719 49) (M25 50)  8  Attention deficit hyperactivity disorder (314 01) (F90 9)  9  Benign hypertension with chronic kidney disease, stage III (403 10,585 3) (I12 9,N18 3)  10  Bilateral hip pain (719 45) (M25 551,M25 552)  11  Bipolar I disorder, most recent episode depressed, severe without psychotic features    (296 53) (F31 4)  12  Blood in stool (578 1) (K92 1)  13  Borderline personality disorder (301 83) (F60 3)  14  Breast cancer screening (V76 10) (Z12 39)  15  Cataplexy (347 01) (G47 411)  16  Cervical cancer screening (V76 2) (Z12 4)  17  Change in bowel habits (787 99) (R19 4)  18  Chronic kidney disease, stage 3 (585 3) (N18 3)  19  Chronic low back pain (724 2,338 29) (M54 5,G89 29)  20  Constipation (564 00) (K59 00)  21  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)  22  Cough (786 2) (R05)  23  Current use of insulin (V58 67) (Z79 4)  24  Diabetes mellitus type II, controlled (250 00) (E11 9)  25  Diabetic eye exam (V72 0,250 00) (E11 9,Z01 00)  26  Diabetic neuropathy (250 60,357 2) (E11 40)  27  Diarrhea (787 91) (R19 7)  28  Dizziness (780 4) (R42)  29  Dysphagia (787 20) (R13 10)  30  Dyspnea (786 09) (R06 00)  31  Encounter for diabetic foot exam (250 00) (E11 9)  32  Exposure to pneumonia (V01 79) (Z20 828)  33  Fatigue (780 79) (R53 83)  34  GERD (gastroesophageal reflux disease) (530 81) (K21 9)  35  Hematuria (599 70) (R31 9)  36  Hyperlipidemia (272 4) (E78 5)  37  Lung nodule (793 11) (R91 1)  38  Memory difficulties (780 93) (R41 3)  39  Nausea (787 02) (R11 0)  40  Nausea and vomiting (787 01) (R11 2)  41  Need for influenza vaccination (V04 81) (Z23)  42  Neuropathic pain (729 2) (M79 2)  43  Opiate use (305 50) (F11 90)  44  Ovarian cyst (620 2) (N83 209)  45  Pancreatitis (577 0) (K85 90)  46  Peripheral neuropathy (356 9) (G62 9)  47  Plantar wart, left foot (078 12) (B07 0)  48   Post traumatic stress disorder (309 81) (F43 10)  49  Preop examination (V72 84) (Z01 818)  50  Proteinuria (791 0) (R80 9)  51  Saddle anesthesia (782 0) (R20 0)  52  Sciatica, right side (724 3) (M54 31)  53  Sleep disturbance (780 50) (G47 9)  54  Therapeutic opioid induced constipation (564 09,E935 2) (K59 03,T40 2X5A)  55  Tooth pain (525 9) (K08 89)  56  Urinary incontinence (788 30) (R32)  57  Weakness of both lower extremities (729 89) (R29 898)  58  Weakness of right side of body (728 87) (R53 1)  59  Wegener's granulomatosis (446 4) (M31 30)  60  Wheezing (786 07) (R06 2)    Past Medical History  Active Problems And Past Medical History Reviewed: The active problems and past medical history were reviewed and updated today  Surgical History  1  History of Release Of Hand Scar Contracture Extensor With Skin Grafts  Surgical History Reviewed: The surgical history was reviewed and updated today  Family History  Mother   1  Denied: Family history of Drug abuse  2  No family history of mental disorder  Father   3  Denied: Family history of Drug abuse  4  No family history of mental disorder  Family History Reviewed: The family history was reviewed and updated today  Social History   · Denied: Alcohol use (V49 89) (Z78 9)   · Daily caffeine consumption, 1 serving a day   · Denied: Drug use (305 90) (F19 90)   · Former smoker (V15 82) (Z87 891)   · No drug use   · No preference on [employer] beliefs   · Social history reviewed, unchanged   · Uses marijuana (305 20) (F12 90)  Social History Reviewed: The social history was reviewed and updated today  Current Meds  1  Amphetamine-Dextroamphet ER 20 MG Oral Capsule Extended Release 24 Hour; Take   1 capsule twice daily; Therapy: 34NII2885 to (Evaluate:11Oct2017); Last Rx:[id number] Ordered  2  FreeStyle Lite Test In Vitro Strip; TEST 3 TIMES DAILY; Therapy: 28Apr2017 to (Evaluate:73Akg1498)  Requested for: 93Usg9523; Last   Rx:[id number] Ordered  3   Gabapentin 300 MG Oral Capsule; 1 capsule TID Recorded  4  Lisinopril 2 5 MG Oral Tablet; TAKE 1 TABLET DAILY; Therapy: 61Jpg9148 to (Evaluate:91Tzh2076)  Requested for: 81Wxd3351; Last   Rx:[id number] Ordered  5  Methocarbamol 500 MG Oral Tablet; TAKE 1 TABLET 3 TIMES DAILY; Therapy: 94ELJ9527 to (Evaluate:33Tnh4958)  Requested for: 17Aug2017; Last   Rx:[id number] Ordered  6  Oxycodone-Acetaminophen 7 5-325 MG Oral Tablet; TAKE 1 TABLET EVERY 6 HOURS   AS NEEDED FOR PAIN;   Therapy: 43FMC8244 to (Evaluate:11Oct2017); Last Rx:[id number] Ordered  7  Promethazine HCl - 12 5 MG Oral Tablet; TAKE 1 TABLET EVERY 6 HOURS AS NEEDED   FOR NAUSEA; Therapy: 59HCD3676 to [id number])  Requested for: 48GOS1493; Last   Rx:[id number] Ordered  Medication List Reviewed: The medication list was reviewed and updated today  Allergies  1  Bactrim TABS  2  Lexapro TABS  3  Navane CAPS  4  PROzac TABS    Vitals  Vital Signs    Recorded: 30QFD2269 11:39AM   Temperature 96 9 F, Tympanic   Heart Rate 88   Systolic 895, LUE, Sitting   Diastolic 70, LUE, Sitting   Height 5 ft 1 in   Weight 200 lb    BMI Calculated 37 79   BSA Calculated 1 89   O2 Saturation 98     Physical Exam    Constitutional   General appearance: No acute distress, well appearing and well nourished  Eyes   Conjunctiva and lids: No swelling, erythema or discharge  Ears, Nose, Mouth, and Throat   External inspection of ears and nose: Normal     Pulmonary   Respiratory effort: No increased work of breathing or signs of respiratory distress  Auscultation of lungs: Clear to auscultation  Cardiovascular   Palpation of heart: Normal PMI, no thrills  Auscultation of heart: Normal rate and rhythm, normal S1 and S2, without murmurs  Examination of extremities for edema and/or varicosities: Normal     Abdomen Epigastric discomfort  Liver and spleen: No hepatomegaly or splenomegaly      Musculoskeletal   Gait and station: Abnormal  -- Pre cyst in the lower extremity due to history cataplexy  Neurologic   Cranial nerves: Cranial nerves 2-12 intact      Psychiatric   Orientation to person, place, and time: Normal          Future Appointments    Date/Time Provider Specialty Site   11/09/2017 04:30 PM Janeen Powell DO [facility] [place]   01/04/2018 09:00 AM Bridgette Landaverde MD Neurology [facility]  [address]   10/31/2017 10:15 AM Krys Salas, Physician Schedule  [facility]     Signatures   Electronically signed by : Jes Sommer MD; Sep 22 2017 12:28PM EST                       (Author)    Electronically signed by : Jes Sommer MD; Sep 22 2017  1:23PM EST                       (Author)

## 2017-10-31 ENCOUNTER — GENERIC CONVERSION - ENCOUNTER (OUTPATIENT)
Dept: OTHER | Facility: OTHER | Age: 47
End: 2017-10-31

## 2017-11-10 ENCOUNTER — HOSPITAL ENCOUNTER (EMERGENCY)
Facility: HOSPITAL | Age: 47
Discharge: HOME | End: 2017-11-10
Attending: EMERGENCY MEDICINE | Admitting: EMERGENCY MEDICINE
Payer: COMMERCIAL

## 2017-11-10 ENCOUNTER — LAB CONVERSION - ENCOUNTER (OUTPATIENT)
Dept: OTHER | Facility: OTHER | Age: 47
End: 2017-11-10

## 2017-11-10 VITALS
HEART RATE: 88 BPM | WEIGHT: 200 LBS | BODY MASS INDEX: 36.58 KG/M2 | DIASTOLIC BLOOD PRESSURE: 83 MMHG | OXYGEN SATURATION: 100 % | TEMPERATURE: 98 F | SYSTOLIC BLOOD PRESSURE: 138 MMHG | RESPIRATION RATE: 18 BRPM

## 2017-11-10 DIAGNOSIS — R10.9 LEFT FLANK PAIN, CHRONIC: ICD-10-CM

## 2017-11-10 DIAGNOSIS — G89.29 CHRONIC PAIN: Primary | ICD-10-CM

## 2017-11-10 DIAGNOSIS — G89.29 LEFT FLANK PAIN, CHRONIC: ICD-10-CM

## 2017-11-10 DIAGNOSIS — Z87.440 HISTORY OF URINARY TRACT INFECTION: ICD-10-CM

## 2017-11-10 DIAGNOSIS — R31.9 HEMATURIA: ICD-10-CM

## 2017-11-10 LAB
ALBUMIN SERPL BCP-MCNC: 3.9 G/DL (ref 3.5–5)
ALP SERPL-CCNC: 129 U/L (ref 46–116)
ALT SERPL W P-5'-P-CCNC: 29 U/L (ref 12–78)
ANION GAP SERPL CALCULATED.3IONS-SCNC: 9 MMOL/L (ref 4–13)
AST SERPL W P-5'-P-CCNC: 21 U/L (ref 5–45)
BACTERIA UR QL AUTO: ABNORMAL /HPF
BASOPHILS # BLD AUTO: 0.01 THOUSANDS/ΜL (ref 0–0.1)
BASOPHILS NFR BLD AUTO: 0 % (ref 0–1)
BILIRUB SERPL-MCNC: 0.28 MG/DL (ref 0.2–1)
BILIRUB UR QL STRIP: NEGATIVE
BUN SERPL-MCNC: 32 MG/DL (ref 5–25)
BUN SERPL-MCNC: 33 MG/DL (ref 7–25)
BUN/CREA RATIO (HISTORICAL): 16 (CALC) (ref 6–22)
CALCIUM SERPL-MCNC: 9.1 MG/DL (ref 8.6–10.2)
CALCIUM SERPL-MCNC: 9.1 MG/DL (ref 8.6–10.2)
CALCIUM SERPL-MCNC: 9.3 MG/DL (ref 8.3–10.1)
CHLORIDE SERPL-SCNC: 107 MMOL/L (ref 100–108)
CHLORIDE SERPL-SCNC: 110 MMOL/L (ref 98–110)
CLARITY UR: CLEAR
CO2 SERPL-SCNC: 23 MMOL/L (ref 20–31)
CO2 SERPL-SCNC: 25 MMOL/L (ref 21–32)
COLOR UR: YELLOW
COLOR, POC: YELLOW
CREAT SERPL-MCNC: 2.04 MG/DL (ref 0.5–1.1)
CREAT SERPL-MCNC: 2.14 MG/DL (ref 0.6–1.3)
CREATININE, RANDOM URINE (HISTORICAL): 187 MG/DL (ref 20–320)
DEPRECATED RDW RBC AUTO: 14.4 % (ref 11–15)
EGFR AFRICAN AMERICAN (HISTORICAL): 33 ML/MIN/1.73M2
EGFR-AMERICAN CALC (HISTORICAL): 29 ML/MIN/1.73M2
EOSINOPHIL # BLD AUTO: 0.08 THOUSAND/ΜL (ref 0–0.61)
EOSINOPHIL NFR BLD AUTO: 1 % (ref 0–6)
ERYTHROCYTE [DISTWIDTH] IN BLOOD BY AUTOMATED COUNT: 13.8 % (ref 11.6–15.1)
GFR SERPL CREATININE-BSD FRML MDRD: 27 ML/MIN/1.73SQ M
GLUCOSE (HISTORICAL): 115 MG/DL (ref 65–99)
GLUCOSE SERPL-MCNC: 122 MG/DL (ref 65–140)
GLUCOSE UR STRIP-MCNC: NEGATIVE MG/DL
HCT VFR BLD AUTO: 37 % (ref 34.8–46.1)
HCT VFR BLD AUTO: 37.3 % (ref 35–45)
HGB BLD-MCNC: 12.5 G/DL (ref 11.7–15.5)
HGB BLD-MCNC: 12.6 G/DL (ref 11.5–15.4)
HGB UR QL STRIP.AUTO: ABNORMAL
KETONES UR STRIP-MCNC: NEGATIVE MG/DL
LEUKOCYTE ESTERASE UR QL STRIP: NEGATIVE
LIPASE SERPL-CCNC: 423 U/L (ref 73–393)
LYMPHOCYTES # BLD AUTO: 1.74 THOUSANDS/ΜL (ref 0.6–4.47)
LYMPHOCYTES NFR BLD AUTO: 23 % (ref 14–44)
MCH RBC QN AUTO: 30.8 PG (ref 27–33)
MCH RBC QN AUTO: 31 PG (ref 26.8–34.3)
MCHC RBC AUTO-ENTMCNC: 33.5 G/DL (ref 32–36)
MCHC RBC AUTO-ENTMCNC: 34.1 G/DL (ref 31.4–37.4)
MCV RBC AUTO: 91 FL (ref 82–98)
MCV RBC AUTO: 91.9 FL (ref 80–100)
MONOCYTES # BLD AUTO: 0.74 THOUSAND/ΜL (ref 0.17–1.22)
MONOCYTES NFR BLD AUTO: 10 % (ref 4–12)
NEUTROPHILS # BLD AUTO: 5.12 THOUSANDS/ΜL (ref 1.85–7.62)
NEUTS SEG NFR BLD AUTO: 66 % (ref 43–75)
NITRITE UR QL STRIP: NEGATIVE
NON-SQ EPI CELLS URNS QL MICRO: ABNORMAL /HPF
PH UR STRIP.AUTO: 5.5 [PH] (ref 4.5–8)
PHOSPHATE SERPL-MCNC: 3.9 MG/DL (ref 2.5–4.5)
PLATELET # BLD AUTO: 277 THOUSAND/UL (ref 140–400)
PLATELET # BLD AUTO: 300 THOUSANDS/UL (ref 149–390)
PMV BLD AUTO: 10.7 FL (ref 7.5–12.5)
PMV BLD AUTO: 9.8 FL (ref 8.9–12.7)
POTASSIUM SERPL-SCNC: 4.2 MMOL/L (ref 3.5–5.3)
POTASSIUM SERPL-SCNC: 4.4 MMOL/L (ref 3.5–5.3)
PROT SERPL-MCNC: 7.9 G/DL (ref 6.4–8.2)
PROT UR STRIP-MCNC: >=300 MG/DL
PROT UR-MCNC: 145 MG/DL (ref 5–24)
PROT/CREAT UR: 775 MG/G CREAT (ref 21–161)
PTH-INTACT SERPL-MCNC: 51 PG/ML (ref 14–64)
RBC # BLD AUTO: 4.06 MILLION/UL (ref 3.81–5.12)
RBC # BLD AUTO: 4.06 MILLION/UL (ref 3.8–5.1)
RBC #/AREA URNS AUTO: ABNORMAL /HPF
SODIUM SERPL-SCNC: 141 MMOL/L (ref 135–146)
SODIUM SERPL-SCNC: 141 MMOL/L (ref 136–145)
SP GR UR STRIP.AUTO: >=1.03 (ref 1–1.03)
URIC ACID (HISTORICAL): 7.5 MG/DL (ref 2.5–7)
UROBILINOGEN UR QL STRIP.AUTO: 0.2 E.U./DL
WBC # BLD AUTO: 5.8 THOUSAND/UL (ref 3.8–10.8)
WBC # BLD AUTO: 7.69 THOUSAND/UL (ref 4.31–10.16)
WBC #/AREA URNS AUTO: ABNORMAL /HPF

## 2017-11-10 PROCEDURE — 83690 ASSAY OF LIPASE: CPT | Performed by: PHYSICIAN ASSISTANT

## 2017-11-10 PROCEDURE — 99284 EMERGENCY DEPT VISIT MOD MDM: CPT

## 2017-11-10 PROCEDURE — 96374 THER/PROPH/DIAG INJ IV PUSH: CPT

## 2017-11-10 PROCEDURE — 81002 URINALYSIS NONAUTO W/O SCOPE: CPT | Performed by: PHYSICIAN ASSISTANT

## 2017-11-10 PROCEDURE — 36415 COLL VENOUS BLD VENIPUNCTURE: CPT | Performed by: PHYSICIAN ASSISTANT

## 2017-11-10 PROCEDURE — 87086 URINE CULTURE/COLONY COUNT: CPT

## 2017-11-10 PROCEDURE — 85025 COMPLETE CBC W/AUTO DIFF WBC: CPT | Performed by: PHYSICIAN ASSISTANT

## 2017-11-10 PROCEDURE — 81001 URINALYSIS AUTO W/SCOPE: CPT

## 2017-11-10 PROCEDURE — 80053 COMPREHEN METABOLIC PANEL: CPT | Performed by: PHYSICIAN ASSISTANT

## 2017-11-10 RX ORDER — METOCLOPRAMIDE HYDROCHLORIDE 5 MG/ML
10 INJECTION INTRAMUSCULAR; INTRAVENOUS ONCE
Status: COMPLETED | OUTPATIENT
Start: 2017-11-10 | End: 2017-11-10

## 2017-11-10 RX ORDER — NITROFURANTOIN 25; 75 MG/1; MG/1
100 CAPSULE ORAL 2 TIMES DAILY
Qty: 20 CAPSULE | Refills: 0 | Status: SHIPPED | OUTPATIENT
Start: 2017-11-10 | End: 2017-12-21

## 2017-11-10 RX ORDER — OXYCODONE HYDROCHLORIDE AND ACETAMINOPHEN 5; 325 MG/1; MG/1
1 TABLET ORAL ONCE
Status: COMPLETED | OUTPATIENT
Start: 2017-11-10 | End: 2017-11-10

## 2017-11-10 RX ADMIN — OXYCODONE HYDROCHLORIDE AND ACETAMINOPHEN 1 TABLET: 5; 325 TABLET ORAL at 04:28

## 2017-11-10 RX ADMIN — METOCLOPRAMIDE 10 MG: 5 INJECTION, SOLUTION INTRAMUSCULAR; INTRAVENOUS at 04:23

## 2017-11-10 NOTE — DISCHARGE INSTRUCTIONS
Urinary Tract Infection in Women, Ambulatory Care   GENERAL INFORMATION:   A urinary tract infection (UTI)  is caused by bacteria that get inside your urinary tract  Most bacteria that enter your urinary tract are expelled when you urinate  If the bacteria stay in your urinary tract, you may get an infection  Your urinary tract includes your kidneys, ureters, bladder, and urethra  Urine is made in your kidneys, and it flows from the ureters to the bladder  Urine leaves the bladder through the urethra  A UTI is more common in your lower urinary tract, which includes your bladder and urethra  Common symptoms include the following:   · Urinating more often or waking from sleep to urinate    · Pain or burning when you urinate    · Pain or pressure in your lower abdomen     · Urine that smells bad    · Blood in your urine    · Leaking urine  Seek immediate care for the following symptoms:   · Urinating very little or not at all    · Vomiting    · Shaking chills with a fever    · Side or back pain that gets worse  Treatment for a UTI  may include medicines to treat a bacterial infection  You may also need medicines to decrease pain and burning, or decrease the urge to urinate often  Prevent a UTI:   · Urinate when you feel the urge  Do not hold your urine  Urinate as soon as you feel you have to  · Drink liquids as directed  Ask how much liquid to drink each day and which liquids are best for you  You may need to drink more fluids than usual to help flush out the bacteria  Do not drink alcohol, caffeine, and citrus juices  These can irritate your bladder and increase your symptoms  · Apply heat  on your abdomen for 20 to 30 minutes every 2 hours for as many days as directed  Heat helps decrease discomfort and pressure in your bladder  Follow up with your healthcare provider as directed:  Write down your questions so you remember to ask them during your visits     CARE AGREEMENT:   You have the right to help plan your care  Learn about your health condition and how it may be treated  Discuss treatment options with your caregivers to decide what care you want to receive  You always have the right to refuse treatment  The above information is an  only  It is not intended as medical advice for individual conditions or treatments  Talk to your doctor, nurse or pharmacist before following any medical regimen to see if it is safe and effective for you  © 2014 [address] is for End User's use only and may not be sold, redistributed or otherwise used for commercial purposes  All illustrations and images included in CareNotes® are the copyrighted property of [employer]  or [address]

## 2017-11-10 NOTE — ED PROVIDER NOTES
History  Chief Complaint   Patient presents with    Multiple Falls     multiple falls  generalized pains all over reported  x9 fall  right hand pain,back pain, abdominal pain  urinary issues  vaginal pains  nausea reported  42-year-old female presents emergency department for multiple falls  Patient also with generalized somatic complaints  The patient is well known to the department with multiple workups in the past   Patient with complaint of left CVA tenderness  Patient states that she was recently treated with antibiotic that significantly improved her symptoms of this left CVA tenderness  This is a chronic complaint which has been worked up in the past   At this time will obtain a urinalysis  Patient denies chest pain  Patient denies shortness of breath  Patient denies nausea  Patient denies abdominal pain  Prior to Admission Medications   Prescriptions Last Dose Informant Patient Reported? Taking?    amphetamine-dextroamphetamine (ADDERALL) 20 mg tablet   Yes Yes   Sig: Take 10 mg by mouth 2 (two) times a day   atorvastatin (LIPITOR) 20 mg tablet   Yes Yes   Sig: Take 20 mg by mouth daily   gabapentin (NEURONTIN) 100 mg capsule   Yes Yes   Sig: Take 100 mg by mouth 3 (three) times a day   insulin aspart (NovoLOG) 100 units/mL injection   Yes Yes   Sig: Inject under the skin 3 (three) times a day before meals   insulin glargine (LANTUS) 100 units/mL subcutaneous injection   Yes Yes   Sig: Inject 22 Units under the skin daily at bedtime   metoprolol succinate (TOPROL-XL) 25 mg 24 hr tablet   Yes Yes   Sig: Take 25 mg by mouth daily   ondansetron (ZOFRAN) 4 mg tablet   Yes Yes   Sig: Take 4 mg by mouth every 8 (eight) hours as needed for nausea or vomiting   oxyCODONE-acetaminophen (PERCOCET) 7 5-325 MG per tablet   Yes Yes   Sig: Take 1 tablet by mouth every 6 (six) hours as needed for moderate pain      Facility-Administered Medications: None       Past Medical History:   Diagnosis Date  Anemia of chronic disease     Anxiety     Asthma     Asthma     Chronic abdominal pain     CKD (chronic kidney disease) stage 3, GFR 30-59 ml/min     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[place] [address] )     Cushing syndrome ([place] [address] )     Diabetes mellitus ([place] [address] )     DVT (deep venous thrombosis) ([place] [address] )     History of acute pancreatitis     felt secondary to Bactrim    HTN (hypertension)     Hyperlipidemia     Hypertension     Microscopic polyangiitis (HCC)     MPA (microscopic polyangiitis) (HCC)     Renal disorder     Self-inflicted injury     self inflicted skin wounds    Wegener's granulomatosis with renal involvement ([place] [address] ) 2015       Past Surgical History:   Procedure Laterality Date    ESOPHAGOGASTRODUODENOSCOPY  09/11/2015    mild antral gastritis       Family History   Problem Relation Age of Onset    Colon cancer Neg Hx      I have reviewed and agree with the history as documented  Social History   Substance Use Topics    Smoking status: Former Smoker     Types: Cigarettes     Quit date: 2010    Smokeless tobacco: Never Used    Alcohol use No        Review of Systems   Constitutional: Negative for chills, fatigue and fever  Respiratory: Negative for chest tightness  Cardiovascular: Negative for chest pain  Genitourinary: Positive for flank pain  Negative for dysuria  History of chronic left flank pain  Musculoskeletal: Negative for back pain  Psychiatric/Behavioral: Positive for behavioral problems  Physical Exam  ED Triage Vitals [11/10/17 0249]   Temperature Pulse Respirations Blood Pressure SpO2   98 °F (36 7 °C) 98 20 148/83 99 %      Temp src Heart Rate Source Patient Position - Orthostatic VS BP Location FiO2 (%)   -- Monitor -- Right arm --      Pain Score       4           Orthostatic Vital Signs  Vitals:    11/10/17 0249 11/10/17 0418   BP: 148/83 138/83   Pulse: 98 88       Physical Exam   Constitutional: She is oriented to person, place, and time   She appears well-developed and well-nourished  Significant emotional distress  HENT:   Head: Normocephalic  Right Ear: External ear normal    Left Ear: External ear normal    Eyes: Conjunctivae are normal    Neck: Normal range of motion  Neck supple  No JVD present  Cardiovascular: Normal rate  Pulmonary/Chest: Effort normal    Abdominal: Soft  Mild tenderness at the CVA junction left side  Musculoskeletal: She exhibits no edema or deformity  Lymphadenopathy:     She has no cervical adenopathy  Neurological: She is alert and oriented to person, place, and time  Skin: Skin is warm and dry  Capillary refill takes less than 2 seconds  No erythema  Multiple well-healed areas from prior skin grafting  Nursing note and vitals reviewed  ED Medications  Medications   metoclopramide (REGLAN) injection 10 mg (10 mg Intravenous Given 11/10/17 0423)   oxyCODONE-acetaminophen (PERCOCET) 5-325 mg per tablet 1 tablet (1 tablet Oral Given 11/10/17 0428)       Diagnostic Studies  Results Reviewed     Procedure Component Value Units Date/Time    Urine Microscopic [[id number]]  (Abnormal) Collected:  11/10/17 0511    Lab Status:  Final result Specimen:  Urine from Urine, Clean Catch Updated:  11/10/17 0426     RBC, UA None Seen /hpf      WBC, UA 10-20 (A) /hpf      Epithelial Cells Occasional /hpf      Bacteria, UA Occasional /hpf     Urine culture [[id number]] Collected:  11/10/17 0511    Lab Status:   In process Specimen:  Urine from Urine, Clean Catch Updated:  11/10/17 0426    Comprehensive metabolic panel [[id number]]  (Abnormal) Collected:  11/10/17 0345    Lab Status:  Final result Specimen:  Blood from Arm, Right Updated:  11/10/17 0407     Sodium 141 mmol/L      Potassium 4 2 mmol/L      Chloride 107 mmol/L      CO2 25 mmol/L      Anion Gap 9 mmol/L      BUN 32 (H) mg/dL      Creatinine 2 14 (H) mg/dL      Glucose 122 mg/dL      Calcium 9 3 mg/dL      AST 21 U/L      ALT 29 U/L      Alkaline Phosphatase 129 (H) U/L      Total Protein 7 9 g/dL      Albumin 3 9 g/dL      Total Bilirubin 0 28 mg/dL      eGFR 27 ml/min/1 73sq m     Narrative:         National Kidney Disease Education Program recommendations are as follows:  GFR calculation is accurate only with a steady state creatinine  Chronic Kidney disease less than 60 ml/min/1 73 sq  meters  Kidney failure less than 15 ml/min/1 73 sq  meters      Lipase [[id number]]  (Abnormal) Collected:  11/10/17 0345    Lab Status:  Final result Specimen:  Blood from Arm, Right Updated:  11/10/17 0407     Lipase 423 (H) u/L     CBC and differential [[id number]]  (Normal) Collected:  11/10/17 0345    Lab Status:  Final result Specimen:  Blood from Arm, Right Updated:  11/10/17 0404     WBC 7 69 Thousand/uL      RBC 4 06 Million/uL      Hemoglobin 12 6 g/dL      Hematocrit 37 0 %      MCV 91 fL      MCH 31 0 pg      MCHC 34 1 g/dL      RDW 13 8 %      MPV 9 8 fL      Platelets 537 Thousands/uL      Neutrophils Relative 66 %      Lymphocytes Relative 23 %      Monocytes Relative 10 %      Eosinophils Relative 1 %      Basophils Relative 0 %      Neutrophils Absolute 5 12 Thousands/µL      Lymphocytes Absolute 1 74 Thousands/µL      Monocytes Absolute 0 74 Thousand/µL      Eosinophils Absolute 0 08 Thousand/µL      Basophils Absolute 0 01 Thousands/µL     POCT urinalysis dipstick [[id number]]  (Abnormal) Resulted:  11/10/17 0359    Lab Status:  Final result Specimen:  Urine Updated:  11/10/17 0359     Color, UA yellow    ED Urine Macroscopic [[id number]]  (Abnormal) Collected:  11/10/17 0511    Lab Status:  Final result Specimen:  Urine Updated:  11/10/17 0356     Color, UA Yellow     Clarity, UA Clear     pH, UA 5 5     Leukocytes, UA Negative     Nitrite, UA Negative     Protein, UA >=300 (A) mg/dl      Glucose, UA Negative mg/dl      Ketones, UA Negative mg/dl      Urobilinogen, UA 0 2 E U /dl      Bilirubin, UA Negative     Blood, UA Moderate (A)     Specific Gravity, UA >=1 030    Narrative:       CLINITEK RESULT No orders to display              Procedures  Procedures       Phone Contacts  ED Phone Contact    ED Course  ED Course                                MDM  Number of Diagnoses or Management Options  Chronic pain:   Hematuria:   History of urinary tract infection:   Left flank pain, chronic:   Diagnosis management comments: Labs reviewed  Will treat UTI  Educated patient of diagnosis and home management  Encourage patient to follow up with primary care  Educated patient of persistent or worsening signs symptoms and follow up primary care and/or return to the emergency department  Patient admits to understanding and agreement  CritCare Time    Disposition  Final diagnoses:   Chronic pain   Left flank pain, chronic   History of urinary tract infection   Hematuria     Time reflects when diagnosis was documented in both MDM as applicable and the Disposition within this note     Time User Action Codes Description Comment    11/10/2017  4:42 AM Carollynn Mons Add [G89 29] Chronic pain     11/10/2017  4:42 AM Carollynn Flight Steward Add [R10 9,  G89 29] Left flank pain, chronic     11/10/2017  4:43 AM Carollynn Flight Steward Add [Z87 440] History of urinary tract infection     11/10/2017  4:43 AM Carolalbann Flight Steward Add [R31 9] Hematuria       ED Disposition     ED Disposition Condition Comment    Discharge  Gabi Shipman discharge to home/self care      Condition at discharge: Stable        Follow-up Information     Follow up With Specialties Details Why Contact Info    William Jc PA-C Family Medicine In 1 day  THE [address]  [phone]          Discharge Medication List as of 11/10/2017  4:47 AM      START taking these medications    Details   nitrofurantoin (MACROBID) 100 mg capsule Take 1 capsule by mouth 2 (two) times a day, Starting Fri 11/10/2017, Normal         CONTINUE these medications which have NOT CHANGED    Details   amphetamine-dextroamphetamine (ADDERALL) 20 mg tablet Take 10 mg by mouth 2 (two) times a day, Historical Med      atorvastatin (LIPITOR) 20 mg tablet Take 20 mg by mouth daily, Historical Med      gabapentin (NEURONTIN) 100 mg capsule Take 100 mg by mouth 3 (three) times a day, Historical Med      insulin aspart (NovoLOG) 100 units/mL injection Inject under the skin 3 (three) times a day before meals, Historical Med      insulin glargine (LANTUS) 100 units/mL subcutaneous injection Inject 22 Units under the skin daily at bedtime, Historical Med      metoprolol succinate (TOPROL-XL) 25 mg 24 hr tablet Take 25 mg by mouth daily, Historical Med      ondansetron (ZOFRAN) 4 mg tablet Take 4 mg by mouth every 8 (eight) hours as needed for nausea or vomiting, Historical Med      oxyCODONE-acetaminophen (PERCOCET) 7 5-325 MG per tablet Take 1 tablet by mouth every 6 (six) hours as needed for moderate pain, Historical Med           No discharge procedures on file      ED Provider  Electronically Signed by           Gregg Mancia PA-C  11/11/17 9670

## 2017-11-11 LAB — BACTERIA UR CULT: NORMAL

## 2017-11-22 ENCOUNTER — GENERIC CONVERSION - ENCOUNTER (OUTPATIENT)
Dept: FAMILY MEDICINE CLINIC | Facility: CLINIC | Age: 47
End: 2017-11-22

## 2017-11-22 ENCOUNTER — GENERIC CONVERSION - ENCOUNTER (OUTPATIENT)
Dept: OTHER | Facility: OTHER | Age: 47
End: 2017-11-22

## 2017-11-22 LAB
LEFT EYE DIABETIC RETINOPATHY: NORMAL
RIGHT EYE DIABETIC RETINOPATHY: NORMAL

## 2017-11-27 ENCOUNTER — GENERIC CONVERSION - ENCOUNTER (OUTPATIENT)
Dept: OTHER | Facility: OTHER | Age: 47
End: 2017-11-27

## 2017-11-27 DIAGNOSIS — R35.0 FREQUENCY OF MICTURITION: ICD-10-CM

## 2017-11-27 DIAGNOSIS — R31.9 HEMATURIA: ICD-10-CM

## 2017-11-27 DIAGNOSIS — N18.30 CHRONIC KIDNEY DISEASE, STAGE III (MODERATE) (HCC): ICD-10-CM

## 2017-11-27 DIAGNOSIS — I12.9 HYPERTENSIVE CHRONIC KIDNEY DISEASE WITH STAGE 1 THROUGH STAGE 4 CHRONIC KIDNEY DISEASE, OR UNSPECIFIED CHRONIC KIDNEY DISEASE: ICD-10-CM

## 2017-11-27 DIAGNOSIS — R32 URINARY INCONTINENCE: ICD-10-CM

## 2017-11-28 ENCOUNTER — APPOINTMENT (OUTPATIENT)
Dept: LAB | Facility: HOSPITAL | Age: 47
End: 2017-11-28
Payer: COMMERCIAL

## 2017-11-28 ENCOUNTER — ALLSCRIPTS OFFICE VISIT (OUTPATIENT)
Dept: OTHER | Facility: OTHER | Age: 47
End: 2017-11-28

## 2017-11-28 DIAGNOSIS — R35.0 FREQUENCY OF MICTURITION: ICD-10-CM

## 2017-11-28 LAB
BACTERIA UR QL AUTO: ABNORMAL /HPF
BILIRUB UR QL STRIP: NEGATIVE
BILIRUB UR QL STRIP: NEGATIVE
CLARITY UR: CLEAR
CLARITY UR: NORMAL
COLOR UR: YELLOW
COLOR UR: YELLOW
GLUCOSE (HISTORICAL): NEGATIVE
GLUCOSE UR STRIP-MCNC: NEGATIVE MG/DL
HGB UR QL STRIP.AUTO: ABNORMAL
HGB UR QL STRIP.AUTO: NORMAL
HYALINE CASTS #/AREA URNS LPF: ABNORMAL /LPF
KETONES UR STRIP-MCNC: NEGATIVE MG/DL
KETONES UR STRIP-MCNC: NEGATIVE MG/DL
LEUKOCYTE ESTERASE UR QL STRIP: NEGATIVE
LEUKOCYTE ESTERASE UR QL STRIP: NEGATIVE
NITRITE UR QL STRIP: NEGATIVE
NITRITE UR QL STRIP: NEGATIVE
NON-SQ EPI CELLS URNS QL MICRO: ABNORMAL /HPF
PH UR STRIP.AUTO: 5 [PH]
PH UR STRIP.AUTO: 5.5 [PH] (ref 4.5–8)
PROT UR STRIP-MCNC: ABNORMAL MG/DL
PROT UR STRIP-MCNC: NORMAL MG/DL
RBC #/AREA URNS AUTO: ABNORMAL /HPF
SP GR UR STRIP.AUTO: 1.02 (ref 1–1.03)
SP GR UR STRIP.AUTO: 1.03
UROBILINOGEN UR QL STRIP.AUTO: 0.2
UROBILINOGEN UR QL STRIP.AUTO: 0.2 E.U./DL
WBC #/AREA URNS AUTO: ABNORMAL /HPF

## 2017-11-28 PROCEDURE — 81001 URINALYSIS AUTO W/SCOPE: CPT

## 2017-12-01 ENCOUNTER — GENERIC CONVERSION - ENCOUNTER (OUTPATIENT)
Dept: OTHER | Facility: OTHER | Age: 47
End: 2017-12-01

## 2017-12-04 ENCOUNTER — GENERIC CONVERSION - ENCOUNTER (OUTPATIENT)
Dept: OTHER | Facility: OTHER | Age: 47
End: 2017-12-04

## 2017-12-21 ENCOUNTER — HOSPITAL ENCOUNTER (INPATIENT)
Facility: HOSPITAL | Age: 47
LOS: 4 days | Discharge: HOME HEALTH | DRG: 439 | End: 2017-12-25
Attending: EMERGENCY MEDICINE | Admitting: INTERNAL MEDICINE
Payer: COMMERCIAL

## 2017-12-21 ENCOUNTER — GENERIC CONVERSION - ENCOUNTER (OUTPATIENT)
Dept: OTHER | Facility: OTHER | Age: 47
End: 2017-12-21

## 2017-12-21 ENCOUNTER — APPOINTMENT (EMERGENCY)
Dept: RADIOLOGY | Facility: HOSPITAL | Age: 47
DRG: 439 | End: 2017-12-21
Payer: COMMERCIAL

## 2017-12-21 DIAGNOSIS — G89.29 CHRONIC PAIN: ICD-10-CM

## 2017-12-21 DIAGNOSIS — K85.90 ACUTE PANCREATITIS: Primary | ICD-10-CM

## 2017-12-21 PROBLEM — F31.9 BIPOLAR 1 DISORDER (HCC): Status: ACTIVE | Noted: 2017-12-21

## 2017-12-21 PROBLEM — R10.9 CHRONIC FLANK PAIN: Status: ACTIVE | Noted: 2017-12-21

## 2017-12-21 LAB
ALBUMIN SERPL BCP-MCNC: 3.6 G/DL (ref 3.5–5)
ALP SERPL-CCNC: 137 U/L (ref 46–116)
ALT SERPL W P-5'-P-CCNC: 28 U/L (ref 12–78)
ANION GAP SERPL CALCULATED.3IONS-SCNC: 10 MMOL/L (ref 4–13)
AST SERPL W P-5'-P-CCNC: 25 U/L (ref 5–45)
BACTERIA UR QL AUTO: ABNORMAL /HPF
BASOPHILS # BLD AUTO: 0.01 THOUSANDS/ΜL (ref 0–0.1)
BASOPHILS NFR BLD AUTO: 0 % (ref 0–1)
BILIRUB SERPL-MCNC: 0.17 MG/DL (ref 0.2–1)
BILIRUB UR QL STRIP: NEGATIVE
BUN SERPL-MCNC: 30 MG/DL (ref 5–25)
CALCIUM SERPL-MCNC: 9.3 MG/DL (ref 8.3–10.1)
CHLORIDE SERPL-SCNC: 105 MMOL/L (ref 100–108)
CLARITY UR: CLEAR
CO2 SERPL-SCNC: 26 MMOL/L (ref 21–32)
COLOR UR: YELLOW
CREAT SERPL-MCNC: 2.01 MG/DL (ref 0.6–1.3)
EOSINOPHIL # BLD AUTO: 0.06 THOUSAND/ΜL (ref 0–0.61)
EOSINOPHIL NFR BLD AUTO: 1 % (ref 0–6)
ERYTHROCYTE [DISTWIDTH] IN BLOOD BY AUTOMATED COUNT: 14 % (ref 11.6–15.1)
GFR SERPL CREATININE-BSD FRML MDRD: 29 ML/MIN/1.73SQ M
GLUCOSE SERPL-MCNC: 122 MG/DL (ref 65–140)
GLUCOSE SERPL-MCNC: 92 MG/DL (ref 65–140)
GLUCOSE SERPL-MCNC: 92 MG/DL (ref 65–140)
GLUCOSE UR STRIP-MCNC: NEGATIVE MG/DL
HCT VFR BLD AUTO: 38.4 % (ref 34.8–46.1)
HGB BLD-MCNC: 12.6 G/DL (ref 11.5–15.4)
HGB UR QL STRIP.AUTO: ABNORMAL
KETONES UR STRIP-MCNC: NEGATIVE MG/DL
LEUKOCYTE ESTERASE UR QL STRIP: ABNORMAL
LIPASE SERPL-CCNC: 2325 U/L (ref 73–393)
LYMPHOCYTES # BLD AUTO: 1.63 THOUSANDS/ΜL (ref 0.6–4.47)
LYMPHOCYTES NFR BLD AUTO: 24 % (ref 14–44)
MCH RBC QN AUTO: 31 PG (ref 26.8–34.3)
MCHC RBC AUTO-ENTMCNC: 32.8 G/DL (ref 31.4–37.4)
MCV RBC AUTO: 95 FL (ref 82–98)
MONOCYTES # BLD AUTO: 0.66 THOUSAND/ΜL (ref 0.17–1.22)
MONOCYTES NFR BLD AUTO: 10 % (ref 4–12)
MUCOUS THREADS UR QL AUTO: ABNORMAL
NEUTROPHILS # BLD AUTO: 4.36 THOUSANDS/ΜL (ref 1.85–7.62)
NEUTS SEG NFR BLD AUTO: 65 % (ref 43–75)
NITRITE UR QL STRIP: NEGATIVE
NON-SQ EPI CELLS URNS QL MICRO: ABNORMAL /HPF
NRBC BLD AUTO-RTO: 0 /100 WBCS
PH UR STRIP.AUTO: 6 [PH] (ref 4.5–8)
PLATELET # BLD AUTO: 294 THOUSANDS/UL (ref 149–390)
PMV BLD AUTO: 10.9 FL (ref 8.9–12.7)
POTASSIUM SERPL-SCNC: 4.4 MMOL/L (ref 3.5–5.3)
PROT SERPL-MCNC: 7.8 G/DL (ref 6.4–8.2)
PROT UR STRIP-MCNC: >=300 MG/DL
RBC # BLD AUTO: 4.06 MILLION/UL (ref 3.81–5.12)
RBC #/AREA URNS AUTO: ABNORMAL /HPF
SODIUM SERPL-SCNC: 141 MMOL/L (ref 136–145)
SP GR UR STRIP.AUTO: >=1.03 (ref 1–1.03)
SPECIMEN SOURCE: NORMAL
SPECIMEN SOURCE: NORMAL
TROPONIN I BLD-MCNC: 0 NG/ML (ref 0–0.08)
TROPONIN I BLD-MCNC: 0 NG/ML (ref 0–0.08)
UROBILINOGEN UR QL STRIP.AUTO: 0.2 E.U./DL
WBC # BLD AUTO: 6.72 THOUSAND/UL (ref 4.31–10.16)
WBC #/AREA URNS AUTO: ABNORMAL /HPF

## 2017-12-21 PROCEDURE — 82948 REAGENT STRIP/BLOOD GLUCOSE: CPT

## 2017-12-21 PROCEDURE — 81001 URINALYSIS AUTO W/SCOPE: CPT

## 2017-12-21 PROCEDURE — 80053 COMPREHEN METABOLIC PANEL: CPT | Performed by: EMERGENCY MEDICINE

## 2017-12-21 PROCEDURE — 84484 ASSAY OF TROPONIN QUANT: CPT

## 2017-12-21 PROCEDURE — 96374 THER/PROPH/DIAG INJ IV PUSH: CPT

## 2017-12-21 PROCEDURE — 71020 HB CHEST X-RAY 2VW FRONTAL&LATL: CPT

## 2017-12-21 PROCEDURE — 81002 URINALYSIS NONAUTO W/O SCOPE: CPT

## 2017-12-21 PROCEDURE — 93005 ELECTROCARDIOGRAM TRACING: CPT

## 2017-12-21 PROCEDURE — 36415 COLL VENOUS BLD VENIPUNCTURE: CPT | Performed by: EMERGENCY MEDICINE

## 2017-12-21 PROCEDURE — 99285 EMERGENCY DEPT VISIT HI MDM: CPT

## 2017-12-21 PROCEDURE — 85025 COMPLETE CBC W/AUTO DIFF WBC: CPT | Performed by: EMERGENCY MEDICINE

## 2017-12-21 PROCEDURE — 96375 TX/PRO/DX INJ NEW DRUG ADDON: CPT

## 2017-12-21 PROCEDURE — 96361 HYDRATE IV INFUSION ADD-ON: CPT

## 2017-12-21 PROCEDURE — 93005 ELECTROCARDIOGRAM TRACING: CPT | Performed by: EMERGENCY MEDICINE

## 2017-12-21 PROCEDURE — 83690 ASSAY OF LIPASE: CPT | Performed by: EMERGENCY MEDICINE

## 2017-12-21 RX ORDER — CYCLOSPORINE 0.5 MG/ML
1 EMULSION OPHTHALMIC 2 TIMES DAILY
Status: DISCONTINUED | OUTPATIENT
Start: 2017-12-22 | End: 2017-12-25 | Stop reason: HOSPADM

## 2017-12-21 RX ORDER — DEXTROAMPHETAMINE SACCHARATE, AMPHETAMINE ASPARTATE, DEXTROAMPHETAMINE SULFATE AND AMPHETAMINE SULFATE 2.5; 2.5; 2.5; 2.5 MG/1; MG/1; MG/1; MG/1
20 TABLET ORAL 2 TIMES DAILY
Status: DISCONTINUED | OUTPATIENT
Start: 2017-12-21 | End: 2017-12-25 | Stop reason: HOSPADM

## 2017-12-21 RX ORDER — ONDANSETRON 2 MG/ML
4 INJECTION INTRAMUSCULAR; INTRAVENOUS EVERY 6 HOURS PRN
Status: DISCONTINUED | OUTPATIENT
Start: 2017-12-21 | End: 2017-12-25 | Stop reason: HOSPADM

## 2017-12-21 RX ORDER — TIZANIDINE 2 MG/1
4 TABLET ORAL EVERY 8 HOURS PRN
Status: DISCONTINUED | OUTPATIENT
Start: 2017-12-21 | End: 2017-12-25 | Stop reason: HOSPADM

## 2017-12-21 RX ORDER — MORPHINE SULFATE 2 MG/ML
2 INJECTION, SOLUTION INTRAMUSCULAR; INTRAVENOUS EVERY 4 HOURS PRN
Status: DISCONTINUED | OUTPATIENT
Start: 2017-12-21 | End: 2017-12-25

## 2017-12-21 RX ORDER — LISINOPRIL 5 MG/1
2.5 TABLET ORAL DAILY
Status: DISCONTINUED | OUTPATIENT
Start: 2017-12-22 | End: 2017-12-25 | Stop reason: HOSPADM

## 2017-12-21 RX ORDER — ONDANSETRON 2 MG/ML
4 INJECTION INTRAMUSCULAR; INTRAVENOUS ONCE
Status: COMPLETED | OUTPATIENT
Start: 2017-12-21 | End: 2017-12-21

## 2017-12-21 RX ORDER — GABAPENTIN 300 MG/1
300 CAPSULE ORAL 3 TIMES DAILY
Status: DISCONTINUED | OUTPATIENT
Start: 2017-12-21 | End: 2017-12-25 | Stop reason: HOSPADM

## 2017-12-21 RX ORDER — CYCLOSPORINE 0.5 MG/ML
1 EMULSION OPHTHALMIC 2 TIMES DAILY
COMMUNITY
End: 2021-09-29 | Stop reason: ALTCHOICE

## 2017-12-21 RX ORDER — ATORVASTATIN CALCIUM 20 MG/1
20 TABLET, FILM COATED ORAL DAILY
Status: DISCONTINUED | OUTPATIENT
Start: 2017-12-22 | End: 2017-12-25 | Stop reason: HOSPADM

## 2017-12-21 RX ORDER — HEPARIN SODIUM 5000 [USP'U]/ML
5000 INJECTION, SOLUTION INTRAVENOUS; SUBCUTANEOUS EVERY 8 HOURS SCHEDULED
Status: DISCONTINUED | OUTPATIENT
Start: 2017-12-21 | End: 2017-12-25 | Stop reason: HOSPADM

## 2017-12-21 RX ORDER — MORPHINE SULFATE 4 MG/ML
4 INJECTION, SOLUTION INTRAMUSCULAR; INTRAVENOUS ONCE
Status: COMPLETED | OUTPATIENT
Start: 2017-12-21 | End: 2017-12-21

## 2017-12-21 RX ORDER — SODIUM CHLORIDE 9 MG/ML
75 INJECTION, SOLUTION INTRAVENOUS CONTINUOUS
Status: DISCONTINUED | OUTPATIENT
Start: 2017-12-21 | End: 2017-12-23

## 2017-12-21 RX ADMIN — GABAPENTIN 300 MG: 300 CAPSULE ORAL at 21:19

## 2017-12-21 RX ADMIN — MORPHINE SULFATE 4 MG: 4 INJECTION, SOLUTION INTRAMUSCULAR; INTRAVENOUS at 17:34

## 2017-12-21 RX ADMIN — SODIUM CHLORIDE 125 ML/HR: 0.9 INJECTION, SOLUTION INTRAVENOUS at 21:25

## 2017-12-21 RX ADMIN — MORPHINE SULFATE 2 MG: 2 INJECTION, SOLUTION INTRAMUSCULAR; INTRAVENOUS at 21:13

## 2017-12-21 RX ADMIN — HEPARIN SODIUM 5000 UNITS: 5000 INJECTION, SOLUTION INTRAVENOUS; SUBCUTANEOUS at 21:21

## 2017-12-21 RX ADMIN — ONDANSETRON 4 MG: 2 INJECTION INTRAMUSCULAR; INTRAVENOUS at 21:15

## 2017-12-21 RX ADMIN — SODIUM CHLORIDE 1000 ML: 0.9 INJECTION, SOLUTION INTRAVENOUS at 17:33

## 2017-12-21 RX ADMIN — DEXTROAMPHETAMINE SACCHARATE, AMPHETAMINE ASPARTATE, DEXTROAMPHETAMINE SULFATE AND AMPHETAMINE SULFATE 20 MG: 2.5; 2.5; 2.5; 2.5 TABLET ORAL at 21:18

## 2017-12-21 RX ADMIN — ONDANSETRON 4 MG: 2 INJECTION INTRAMUSCULAR; INTRAVENOUS at 17:33

## 2017-12-22 ENCOUNTER — APPOINTMENT (INPATIENT)
Dept: ULTRASOUND IMAGING | Facility: HOSPITAL | Age: 47
DRG: 439 | End: 2017-12-22
Payer: COMMERCIAL

## 2017-12-22 LAB
ANION GAP SERPL CALCULATED.3IONS-SCNC: 12 MMOL/L (ref 4–13)
ATRIAL RATE: 66 BPM
ATRIAL RATE: 84 BPM
ATRIAL RATE: 85 BPM
BUN SERPL-MCNC: 26 MG/DL (ref 5–25)
CALCIUM SERPL-MCNC: 8.6 MG/DL (ref 8.3–10.1)
CHLORIDE SERPL-SCNC: 108 MMOL/L (ref 100–108)
CO2 SERPL-SCNC: 21 MMOL/L (ref 21–32)
CREAT SERPL-MCNC: 1.68 MG/DL (ref 0.6–1.3)
ERYTHROCYTE [DISTWIDTH] IN BLOOD BY AUTOMATED COUNT: 14 % (ref 11.6–15.1)
EST. AVERAGE GLUCOSE BLD GHB EST-MCNC: 134 MG/DL
GFR SERPL CREATININE-BSD FRML MDRD: 36 ML/MIN/1.73SQ M
GLUCOSE SERPL-MCNC: 100 MG/DL (ref 65–140)
GLUCOSE SERPL-MCNC: 100 MG/DL (ref 65–140)
GLUCOSE SERPL-MCNC: 140 MG/DL (ref 65–140)
GLUCOSE SERPL-MCNC: 82 MG/DL (ref 65–140)
GLUCOSE SERPL-MCNC: 87 MG/DL (ref 65–140)
HBA1C MFR BLD: 6.3 % (ref 4.2–6.3)
HCT VFR BLD AUTO: 35.8 % (ref 34.8–46.1)
HGB BLD-MCNC: 11.8 G/DL (ref 11.5–15.4)
LIPASE SERPL-CCNC: 425 U/L (ref 73–393)
MCH RBC QN AUTO: 31.5 PG (ref 26.8–34.3)
MCHC RBC AUTO-ENTMCNC: 33 G/DL (ref 31.4–37.4)
MCV RBC AUTO: 96 FL (ref 82–98)
P AXIS: 264 DEGREES
P AXIS: 58 DEGREES
P AXIS: 68 DEGREES
PLATELET # BLD AUTO: 257 THOUSANDS/UL (ref 149–390)
PMV BLD AUTO: 10.6 FL (ref 8.9–12.7)
POTASSIUM SERPL-SCNC: 4.1 MMOL/L (ref 3.5–5.3)
PR INTERVAL: 134 MS
PR INTERVAL: 144 MS
PR INTERVAL: 168 MS
QRS AXIS: 18 DEGREES
QRS AXIS: 75 DEGREES
QRS AXIS: 9 DEGREES
QRSD INTERVAL: 56 MS
QRSD INTERVAL: 72 MS
QRSD INTERVAL: 74 MS
QT INTERVAL: 364 MS
QT INTERVAL: 394 MS
QT INTERVAL: 574 MS
QTC INTERVAL: 413 MS
QTC INTERVAL: 433 MS
QTC INTERVAL: 678 MS
RBC # BLD AUTO: 3.75 MILLION/UL (ref 3.81–5.12)
SODIUM SERPL-SCNC: 141 MMOL/L (ref 136–145)
T WAVE AXIS: -79 DEGREES
T WAVE AXIS: 50 DEGREES
T WAVE AXIS: 76 DEGREES
VENTRICULAR RATE: 66 BPM
VENTRICULAR RATE: 84 BPM
VENTRICULAR RATE: 85 BPM
WBC # BLD AUTO: 5.08 THOUSAND/UL (ref 4.31–10.16)

## 2017-12-22 PROCEDURE — 76705 ECHO EXAM OF ABDOMEN: CPT

## 2017-12-22 PROCEDURE — 83690 ASSAY OF LIPASE: CPT | Performed by: PHYSICIAN ASSISTANT

## 2017-12-22 PROCEDURE — 83036 HEMOGLOBIN GLYCOSYLATED A1C: CPT | Performed by: PHYSICIAN ASSISTANT

## 2017-12-22 PROCEDURE — 85027 COMPLETE CBC AUTOMATED: CPT | Performed by: PHYSICIAN ASSISTANT

## 2017-12-22 PROCEDURE — 82948 REAGENT STRIP/BLOOD GLUCOSE: CPT

## 2017-12-22 PROCEDURE — 80048 BASIC METABOLIC PNL TOTAL CA: CPT | Performed by: PHYSICIAN ASSISTANT

## 2017-12-22 RX ORDER — PANTOPRAZOLE SODIUM 40 MG/1
40 TABLET, DELAYED RELEASE ORAL
Status: DISCONTINUED | OUTPATIENT
Start: 2017-12-22 | End: 2017-12-25 | Stop reason: HOSPADM

## 2017-12-22 RX ORDER — QUETIAPINE FUMARATE 25 MG/1
50 TABLET, FILM COATED ORAL
Status: DISCONTINUED | OUTPATIENT
Start: 2017-12-22 | End: 2017-12-23 | Stop reason: SDUPTHER

## 2017-12-22 RX ORDER — QUETIAPINE FUMARATE 50 MG/1
50 TABLET, FILM COATED ORAL
COMMUNITY
End: 2018-02-27 | Stop reason: SDUPTHER

## 2017-12-22 RX ADMIN — SODIUM CHLORIDE 75 ML/HR: 0.9 INJECTION, SOLUTION INTRAVENOUS at 16:38

## 2017-12-22 RX ADMIN — CYCLOSPORINE 1 DROP: 0.5 EMULSION OPHTHALMIC at 18:50

## 2017-12-22 RX ADMIN — MORPHINE SULFATE 2 MG: 2 INJECTION, SOLUTION INTRAMUSCULAR; INTRAVENOUS at 15:39

## 2017-12-22 RX ADMIN — GABAPENTIN 300 MG: 300 CAPSULE ORAL at 16:38

## 2017-12-22 RX ADMIN — HEPARIN SODIUM 5000 UNITS: 5000 INJECTION, SOLUTION INTRAVENOUS; SUBCUTANEOUS at 21:23

## 2017-12-22 RX ADMIN — ONDANSETRON 4 MG: 2 INJECTION INTRAMUSCULAR; INTRAVENOUS at 12:49

## 2017-12-22 RX ADMIN — QUETIAPINE FUMARATE 50 MG: 25 TABLET ORAL at 21:24

## 2017-12-22 RX ADMIN — DEXTROAMPHETAMINE SACCHARATE, AMPHETAMINE ASPARTATE, DEXTROAMPHETAMINE SULFATE AND AMPHETAMINE SULFATE 20 MG: 2.5; 2.5; 2.5; 2.5 TABLET ORAL at 14:12

## 2017-12-22 RX ADMIN — MORPHINE SULFATE 2 MG: 2 INJECTION, SOLUTION INTRAMUSCULAR; INTRAVENOUS at 11:00

## 2017-12-22 RX ADMIN — PANTOPRAZOLE SODIUM 40 MG: 40 TABLET, DELAYED RELEASE ORAL at 11:01

## 2017-12-22 RX ADMIN — LISINOPRIL 2.5 MG: 5 TABLET ORAL at 08:45

## 2017-12-22 RX ADMIN — HEPARIN SODIUM 5000 UNITS: 5000 INJECTION, SOLUTION INTRAVENOUS; SUBCUTANEOUS at 05:01

## 2017-12-22 RX ADMIN — MORPHINE SULFATE 2 MG: 2 INJECTION, SOLUTION INTRAMUSCULAR; INTRAVENOUS at 01:31

## 2017-12-22 RX ADMIN — ATORVASTATIN CALCIUM 20 MG: 20 TABLET, FILM COATED ORAL at 08:45

## 2017-12-22 RX ADMIN — CYCLOSPORINE 1 DROP: 0.5 EMULSION OPHTHALMIC at 08:45

## 2017-12-22 RX ADMIN — GABAPENTIN 300 MG: 300 CAPSULE ORAL at 08:45

## 2017-12-22 RX ADMIN — HEPARIN SODIUM 5000 UNITS: 5000 INJECTION, SOLUTION INTRAVENOUS; SUBCUTANEOUS at 14:14

## 2017-12-22 RX ADMIN — GABAPENTIN 300 MG: 300 CAPSULE ORAL at 21:23

## 2017-12-22 RX ADMIN — ONDANSETRON 4 MG: 2 INJECTION INTRAMUSCULAR; INTRAVENOUS at 18:53

## 2017-12-22 RX ADMIN — DEXTROAMPHETAMINE SACCHARATE, AMPHETAMINE ASPARTATE, DEXTROAMPHETAMINE SULFATE AND AMPHETAMINE SULFATE 20 MG: 2.5; 2.5; 2.5; 2.5 TABLET ORAL at 21:23

## 2017-12-22 RX ADMIN — MORPHINE SULFATE 2 MG: 2 INJECTION, SOLUTION INTRAMUSCULAR; INTRAVENOUS at 06:29

## 2017-12-22 RX ADMIN — ONDANSETRON 4 MG: 2 INJECTION INTRAMUSCULAR; INTRAVENOUS at 04:46

## 2017-12-22 RX ADMIN — SODIUM CHLORIDE 125 ML/HR: 0.9 INJECTION, SOLUTION INTRAVENOUS at 04:37

## 2017-12-22 RX ADMIN — MORPHINE SULFATE 2 MG: 2 INJECTION, SOLUTION INTRAMUSCULAR; INTRAVENOUS at 21:19

## 2017-12-22 NOTE — CASE MANAGEMENT
Initial Clinical Review    Admission: Date/Time/Statement: 12/21/17 @ 1917     Orders Placed This Encounter   Procedures    Inpatient Admission (expected length of stay for this patient is greater than two midnights)     Standing Status:   Standing     Number of Occurrences:   1     Order Specific Question:   Admitting Physician     Answer:   Ju Armijo [[id number]]     Order Specific Question:   Level of Care     Answer:   Med Surg [[id number]]     Order Specific Question:   Estimated length of stay     Answer:   More than 2 Midnights     Order Specific Question:   Certification     Answer:   I certify that inpatient services are medically necessary for this patient for a duration of greater than two midnights  See H&P and MD Progress Notes for additional information about the patient's course of treatment  ED: Date/Time/Mode of Arrival:   ED Arrival Information     Expected Arrival Acuity Means of Arrival Escorted By Service Admission Type    - 12/21/2017 15:44 Urgent Wheelchair Self General Medicine Urgent    Arrival Complaint    Abdominal Pain          Chief Complaint:   Chief Complaint   Patient presents with    Flank Pain     patient reports L sided flank pain for one year  patient reports she has been to the ED multiple times with the same complaint  Patient reports "you give me antibiotics and then it goes away, but then it comes back"     Cough     patient reporting cough for 10 days  patient reports chills, and "slight fever  Did not go over 101"        History of Illness:    Kali Duval is a 55 y o  female who presents with abdominal pain for 4-5 days  She has multiple complaints today, most of them chronic  In regards to her right upper quadrant pain, this she states is new  It radiates to the back  She did have vomiting 2 days ago but now only nausea  She has had decreased PO intake  Pain made worse with eating   She has a history of pancreatitis in the past  She was seen last by Dr Yamileth Ruffin in September  An abdominal ultrasound was ordered but she cancelled it  She also complains of chronic intermittent left flank pain  She was treated in November with levaquin for a UTI  She states she has had low grade fevers at home as well as a cough for the past 10 days         ED Vital Signs:   ED Triage Vitals   Temperature Pulse Respirations Blood Pressure SpO2   12/21/17 1554 12/21/17 1552 12/21/17 1552 12/21/17 1552 12/21/17 1552   98 4 °F (36 9 °C) 89 20 156/81 97 %      Temp Source Heart Rate Source Patient Position - Orthostatic VS BP Location FiO2 (%)   12/21/17 1554 12/21/17 1552 12/21/17 1552 12/21/17 1805 --   Oral Monitor Sitting Left arm       Pain Score       12/21/17 1552       8        Wt Readings from Last 1 Encounters:   12/21/17 92 1 kg (203 lb)       Vital Signs (abnormal):    above    Abnormal Labs/Diagnostic Test Results:   Lipase   2,325  BUN/Creat    30/2 01  Alk phos   137  Total  Bili  0 17  U/S  Gb:   Hepatic  Steatosis  CXR:  NAD    ED Treatment:   Medication Administration from 12/21/2017 1544 to 12/21/2017 2101       Date/Time Order Dose Route Action Action by Comments     12/21/2017 1734 morphine (PF) 4 mg/mL injection 4 mg 4 mg Intravenous Given Kale Brar RN      12/21/2017 1733 sodium chloride 0 9 % bolus 1,000 mL 1,000 mL Intravenous New Bag Kale Brar RN      12/21/2017 1733 ondansetron (ZOFRAN) injection 4 mg 4 mg Intravenous Given Cr Sandoval RN           Past Medical/Surgical History:    Active Ambulatory Problems     Diagnosis Date Noted    IDDM (insulin dependent diabetes mellitus) ([facility] ) 04/06/2016    Essential hypertension 04/06/2016    Dyslipidemia 04/06/2016    Wegener's granulomatosis ([facility] ) 04/06/2016    CKD (chronic kidney disease) stage 3, GFR 30-59 ml/min     MPA (microscopic polyangiitis) (HCC)     Asthma     HTN (hypertension)     Acute pancreatitis 07/24/2016    Chronic pain 07/25/2017    Noncompliance 07/25/2017     Resolved Ambulatory Problems Diagnosis Date Noted    Acute kidney injury ([place] [address] ) 04/06/2016    Chest pain 04/06/2016    Dehydration 04/06/2016    Acute kidney injury superimposed on chronic kidney disease ([place] [address] ) 07/25/2017     Past Medical History:   Diagnosis Date    Anemia of chronic disease     Anxiety     Asthma     Asthma     Chronic abdominal pain     CKD (chronic kidney disease) stage 3, GFR 30-59 ml/min     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[place] [address] )     Cushing syndrome ([place] [address] )     Diabetes mellitus ([place] [address] )     DVT (deep venous thrombosis) ([place] [address] )     History of acute pancreatitis     HTN (hypertension)     Hyperlipidemia     Hypertension     Microscopic polyangiitis (HCC)     MPA (microscopic polyangiitis) (HCC)     Renal disorder     Self-inflicted injury     Wegener's granulomatosis with renal involvement ([place] [address] ) 2015       Admitting Diagnosis: Acute pancreatitis [K85 90]  Chronic pain [G89 29]  Unspecified abdominal pain [R10 9]    Age/Sex: 55 y o  female    Assessment/Plan:    Acute pancreatitis  ? Admit to med/surg  Lipase 2325 in ED  Will keep NPO for now  Consult GI  Seen last by Dr Divya Marks in September who recommended an ultrasound  Patient admits to cancelling the ultrasound and has not been back to GI for follow up  Will repeat lipase in AM  Will order ultrasound       Plan for Additional Problems:   · IDDM- patient discontinued her scheduled home insulin but does check her blood sugar at home  She will give herself insulin if she feels her blood sugar is too high  Will order accuchecks while hospitalized  · CKD- creatinine 2 01 at present  Baseline 1 8-2 0  Avoid nephrotoxic medications  IV fluids ordered while NPO  · HTN- patient recently stopped her metoprolol but did start low dose lisinopril  Will continue  · Chronic pain- on percocet at home  Patient states she was on mannitol in the past but cannot remember who prescribed it for her   Will order morphine while NPO  · Bipolar disorder- continue home medications  · Chronic flank pain- recently treated for UTI with levaquin in November  Urine looks clean at present       VTE Prophylaxis: Heparin  / sequential compression device   Code Status: full cdde  POLST: There is no POLST form on file for this patient (pre-hospital)     Anticipated Length of Stay:  Patient will be admitted on an Inpatient basis with an anticipated length of stay of  Greater than 2 midnights  Justification for Hospital Stay: patient requires NPO, ultrasound abdomen and GI consult       Admission Orders:   IP  12/21  @    1917  Scheduled Meds:   amphetamine-dextroamphetamine 20 mg Oral BID   atorvastatin 20 mg Oral Daily   cycloSPORINE 1 drop Both Eyes BID   gabapentin 300 mg Oral TID   heparin (porcine) 5,000 Units Subcutaneous Q8H [address]   insulin lispro 1-5 Units Subcutaneous Q6H [address]   lisinopril 2 5 mg Oral Daily   pantoprazole 40 mg Oral Early Morning     Continuous Infusions:   sodium chloride 75 mL/hr Last Rate: 75 mL/hr (12/22/17 0959)     PRN Meds: morphine injection    ondansetron    tiZANidine     PT/OT  Cons  GI  Cl liq  Diet  U/S  GB  IV mSO4  PRN  (  X 3  12/22 thus far)  IV  zofran  Prn ( x 1  12/22 thus far)    Per  GI consult:    Abd pain/ elevated lipase - She presented with abdominal pain & was found to have an elevated lipase  She did have N/V 2 days prior which had resolved  It is possible that she did have pancreatitis  It was previously felt that her pancreatitis was from her microscopic polyangiitis  She states she had her ANCA checked 4-5 months ago & it was WNL's  She states she is not on any medications for this currently but was on prednisone & azathioprine in the past  She denies alcohol  I do agree with checking an US to r/o stones   -[facility] as tolerated    Thank you,  [facility] in the [facility] by Gian Matias for 2017  Network Utilization Review Department  Phone: [phone];  Fax [phone]  ATTENTION: The Network Utilization Review Department is now centralized for our 7 Facilities  Make a note that we have a new phone and fax numbers for our Department  Please call with any questions or concerns to [phone] and carefully follow the prompts so that you are directed to the right person  All voicemails are confidential  Fax any determinations, approvals, denials, and requests for initial or continue stay review clinical to [phone]   Due to HIGH CALL volume, it would be easier if you could please send faxed requests to expedite your requests and in part, help us provide discharge notifications faster

## 2017-12-22 NOTE — H&P
History and Physical - [facility] Internal Medicine    Patient Information: Sarah Charles 55 y o  female MRN: [id number]  Unit/Bed#: ED 28 Encounter: [id number]  Admitting Physician: Rivera oSsa PA-C  PCP: Cameron Cleary PA-C  Date of Admission:  12/21/17    Assessment/Plan:    Hospital Problem List:     Principal Problem:    Acute pancreatitis  Active Problems:    IDDM (insulin dependent diabetes mellitus) ([facility] [address] )    Dyslipidemia    Wegener's granulomatosis ([facility] [address] )    CKD (chronic kidney disease) stage 3, GFR 30-59 ml/min    MPA (microscopic polyangiitis) (HCC)    HTN (hypertension)    Chronic pain    Noncompliance    Bipolar 1 disorder (HCC)    Chronic flank pain      Plan for the Primary Problem(s):    Acute pancreatitis  · Admit to med/surg  Lipase 2325 in ED  Will keep NPO for now  Consult GI  Seen last by Dr Tom Pérez in September who recommended an ultrasound  Patient admits to cancelling the ultrasound and has not been back to GI for follow up  Will repeat lipase in AM  Will order ultrasound  Plan for Additional Problems:   · IDDM- patient discontinued her scheduled home insulin but does check her blood sugar at home  She will give herself insulin if she feels her blood sugar is too high  Will order accuchecks while hospitalized  · CKD- creatinine 2 01 at present  Baseline 1 8-2 0  Avoid nephrotoxic medications  IV fluids ordered while NPO  · HTN- patient recently stopped her metoprolol but did start low dose lisinopril  Will continue  · Chronic pain- on percocet at home  Patient states she was on mannitol in the past but cannot remember who prescribed it for her  Will order morphine while NPO  · Bipolar disorder- continue home medications  · Chronic flank pain- recently treated for UTI with levaquin in November  Urine looks clean at present       VTE Prophylaxis: Heparin  / sequential compression device   Code Status: full cdde  POLST: There is no POLST form on file for this patient (pre-hospital)    Anticipated Length of Stay:  Patient will be admitted on an Inpatient basis with an anticipated length of stay of  Greater than 2 midnights  Justification for Hospital Stay: patient requires NPO, ultrasound abdomen and GI consult    Total Time for Visit, including Counseling / Coordination of Care: 45 minutes  Greater than 50% of this total time spent on direct patient counseling and coordination of care  Chief Complaint:   Abdominal pain    History of Present Illness:    Shreya Wheat is a 55 y o  female who presents with abdominal pain for 4-5 days  She has multiple complaints today, most of them chronic  In regards to her right upper quadrant pain, this she states is new  It radiates to the back  She did have vomiting 2 days ago but now only nausea  She has had decreased PO intake  Pain made worse with eating  She has a history of pancreatitis in the past  She was seen last by Dr Dasha Bhatti in September  An abdominal ultrasound was ordered but she cancelled it  She also complains of chronic intermittent left flank pain  She was treated in November with levaquin for a UTI  She states she has had low grade fevers at home as well as a cough for the past 10 days  Review of Systems:    Review of Systems   Constitutional: Positive for appetite change and fever  HENT: Negative  Eyes: Negative  Respiratory: Positive for cough  Cardiovascular: Negative  Gastrointestinal: Positive for abdominal pain, nausea and vomiting  Endocrine: Negative  Genitourinary: Positive for flank pain  Musculoskeletal: Positive for back pain  Skin: Negative  Allergic/Immunologic: Negative  Neurological: Negative  Psychiatric/Behavioral: Negative          Past Medical and Surgical History:     Past Medical History:   Diagnosis Date    Anemia of chronic disease     Anxiety     Asthma     Asthma     Chronic abdominal pain     CKD (chronic kidney disease) stage 3, GFR 30-59 ml/min     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[place] [address] )     Cushing syndrome ([place] [address] )     Diabetes mellitus ([place] [address] )     DVT (deep venous thrombosis) ([place] [address] )     History of acute pancreatitis     felt secondary to Bactrim    HTN (hypertension)     Hyperlipidemia     Hypertension     Microscopic polyangiitis (HCC)     MPA (microscopic polyangiitis) (HCC)     Renal disorder     Self-inflicted injury     self inflicted skin wounds    Wegener's granulomatosis with renal involvement ([place] [address] ) 2015       Past Surgical History:   Procedure Laterality Date    ESOPHAGOGASTRODUODENOSCOPY  09/11/2015    mild antral gastritis       Meds/Allergies:    Prior to Admission medications    Medication Sig Start Date End Date Taking?  Authorizing Provider   amphetamine-dextroamphetamine (ADDERALL) 20 mg tablet Take 20 mg by mouth 2 (two) times a day     Yes Historical Provider, MD   atorvastatin (LIPITOR) 20 mg tablet Take 20 mg by mouth daily   Yes Historical Provider, MD   cycloSPORINE (RESTASIS) 0 05 % ophthalmic emulsion Administer 1 drop to both eyes 2 (two) times a day   Yes Historical Provider, MD   gabapentin (NEURONTIN) 100 mg capsule Take 300 mg by mouth 3 (three) times a day     Yes Historical Provider, MD   insulin aspart (NovoLOG) 100 units/mL injection Inject under the skin 3 (three) times a day before meals   Yes Historical Provider, MD   insulin glargine (LANTUS) 100 units/mL subcutaneous injection Inject 22 Units under the skin daily at bedtime   Yes Historical Provider, MD   metoprolol succinate (TOPROL-XL) 25 mg 24 hr tablet Take 25 mg by mouth daily   Yes Historical Provider, MD   ondansetron (ZOFRAN) 4 mg tablet Take 4 mg by mouth every 8 (eight) hours as needed for nausea or vomiting   Yes Historical Provider, MD   oxyCODONE-acetaminophen (PERCOCET) 7 5-325 MG per tablet Take 1 tablet by mouth every 6 (six) hours as needed for moderate pain   Yes Historical Provider, MD   polyvinyl alcohol-povidone (REFRESH) 1 4-0 6 % ophthalmic solution 1-2 drops as needed for wound care   Yes Historical Provider, MD   nitrofurantoin (MACROBID) 100 mg capsule Take 1 capsule by mouth 2 (two) times a day 11/10/17 12/21/17  Genevieve Both, DO     I have reviewed home medications with patient personally  Allergies: Allergies   Allergen Reactions    Bactrim [Sulfamethoxazole-Trimethoprim]      Pt "They think that is what cause the pancreatitis"     Haldol [Haloperidol] Other (See Comments)     "I don't like it"    Ibuprofen     Navane [Thiothixene]      SI    Other      "novaine?" antipsychotic    Prozac [Fluoxetine Hcl]      SI    Lexapro [Escitalopram Oxalate] Rash       Social History:     Marital Status: Single     Patient Pre-hospital Living Situation: lives alone in apartment  Patient Pre-hospital Level of Mobility: uses motorized scooter  Patient Pre-hospital Diet Restrictions: none  Substance Use History:   History   Alcohol Use No     History   Smoking Status    Former Smoker    Types: Cigarettes    Quit date: 2010   Smokeless Tobacco    Never Used     History   Drug Use    Types: Marijuana       Family History:    non-contributory    Physical Exam:     Vitals:   Blood Pressure: 124/71 (12/21/17 1945)  Pulse: 68 (12/21/17 1945)  Temperature: 98 4 °F (36 9 °C) (12/21/17 1554)  Temp Source: Oral (12/21/17 1554)  Respirations: 20 (12/21/17 1945)  Weight - Scale: 92 1 kg (203 lb) (12/21/17 1552)  SpO2: 95 % (12/21/17 1945)    Physical Exam   Constitutional: She is oriented to person, place, and time  She appears well-developed and well-nourished  No distress  HENT:   Head: Normocephalic and atraumatic  Eyes: Conjunctivae are normal  Pupils are equal, round, and reactive to light  Neck: Normal range of motion  Neck supple  No JVD present  No tracheal deviation present  No thyromegaly present  Cardiovascular: Normal rate and regular rhythm  Pulmonary/Chest: Effort normal and breath sounds normal  No respiratory distress  She has no wheezes  Abdominal: Soft  Bowel sounds are normal  She exhibits no distension  Right upper quadrant tenderness   Musculoskeletal: Normal range of motion  She exhibits no edema, tenderness or deformity  Neurological: She is alert and oriented to person, place, and time  Skin: Skin is warm and dry  No rash noted  She is not diaphoretic  No erythema  No pallor  Psychiatric: She has a normal mood and affect  Her behavior is normal    Vitals reviewed  Additional Data:     Lab Results: I have personally reviewed pertinent reports  Results from last 7 days  Lab Units 12/21/17  1730   WBC Thousand/uL 6 72   HEMOGLOBIN g/dL 12 6   HEMATOCRIT % 38 4   PLATELETS Thousands/uL 294   NEUTROS PCT % 65   LYMPHS PCT % 24   MONOS PCT % 10   EOS PCT % 1       Results from last 7 days  Lab Units 12/21/17  1730   SODIUM mmol/L 141   POTASSIUM mmol/L 4 4   CHLORIDE mmol/L 105   CO2 mmol/L 26   BUN mg/dL 30*   CREATININE mg/dL 2 01*   CALCIUM mg/dL 9 3   TOTAL PROTEIN g/dL 7 8   BILIRUBIN TOTAL mg/dL 0 17*   ALK PHOS U/L 137*   ALT U/L 28   AST U/L 25   GLUCOSE RANDOM mg/dL 122           Imaging: I have personally reviewed pertinent reports  Xr Chest 2 Views    Result Date: 12/21/2017  Narrative: CHEST DUAL ENERGY INDICATION:  Cough  Bilateral flank pain  Former smoker  COMPARISON:  1/18/2017 VIEWS:  PA (including soft tissue and bone algorithms) and lateral projections; 4 images FINDINGS:     Cardiomediastinal silhouette appears unremarkable  The lungs are clear  No pneumothorax or pleural effusion  Visualized osseous structures appear within normal limits for the patient's age  Impression: No active pulmonary disease  Workstation performed: [id number]       EKG, Pathology, and Other Studies Reviewed on Admission:   · EKG: no ischemic changes    Allscripts / Epic Records Reviewed: Yes     ** Please Note: This note has been constructed using a voice recognition system   **

## 2017-12-22 NOTE — ED PROVIDER NOTES
History  Chief Complaint   Patient presents with    Flank Pain     patient reports L sided flank pain for one year  patient reports she has been to the ED multiple times with the same complaint  Patient reports "you give me antibiotics and then it goes away, but then it comes back"     Cough     patient reporting cough for 10 days  patient reports chills, and "slight fever  Did not go over 101"      HPI    Prior to Admission Medications   Prescriptions Last Dose Informant Patient Reported? Taking?    amphetamine-dextroamphetamine (ADDERALL) 20 mg tablet   Yes Yes   Sig: Take 20 mg by mouth 2 (two) times a day     atorvastatin (LIPITOR) 20 mg tablet   Yes Yes   Sig: Take 20 mg by mouth daily   cycloSPORINE (RESTASIS) 0 05 % ophthalmic emulsion   Yes Yes   Sig: Administer 1 drop to both eyes 2 (two) times a day   gabapentin (NEURONTIN) 100 mg capsule   Yes Yes   Sig: Take 300 mg by mouth 3 (three) times a day     insulin aspart (NovoLOG) 100 units/mL injection   Yes Yes   Sig: Inject under the skin 3 (three) times a day before meals   insulin glargine (LANTUS) 100 units/mL subcutaneous injection   Yes Yes   Sig: Inject 22 Units under the skin daily at bedtime   metoprolol succinate (TOPROL-XL) 25 mg 24 hr tablet Past Month at Unknown time  Yes Yes   Sig: Take 25 mg by mouth daily   ondansetron (ZOFRAN) 4 mg tablet   Yes Yes   Sig: Take 4 mg by mouth every 8 (eight) hours as needed for nausea or vomiting   oxyCODONE-acetaminophen (PERCOCET) 7 5-325 MG per tablet   Yes Yes   Sig: Take 1 tablet by mouth every 6 (six) hours as needed for moderate pain   polyvinyl alcohol-povidone (REFRESH) 1 4-0 6 % ophthalmic solution   Yes Yes   Si-2 drops as needed for wound care      Facility-Administered Medications: None       Past Medical History:   Diagnosis Date    Anemia of chronic disease     Anxiety     Asthma     Asthma     Chronic abdominal pain     CKD (chronic kidney disease) stage 3, GFR 30-59 ml/min     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[place] [address] )     Cushing syndrome ([place] [address] )     Diabetes mellitus ([place] [address] )     DVT (deep venous thrombosis) ([place] [address] )     History of acute pancreatitis     felt secondary to Bactrim    HTN (hypertension)     Hyperlipidemia     Hypertension     Microscopic polyangiitis (HCC)     MPA (microscopic polyangiitis) (HCC)     Renal disorder     Self-inflicted injury     self inflicted skin wounds    Wegener's granulomatosis with renal involvement ([place] [address] ) 2015       Past Surgical History:   Procedure Laterality Date    ESOPHAGOGASTRODUODENOSCOPY  09/11/2015    mild antral gastritis       Family History   Problem Relation Age of Onset    Colon cancer Neg Hx      I have reviewed and agree with the history as documented      Social History   Substance Use Topics    Smoking status: Former Smoker     Types: Cigarettes     Quit date: 2010    Smokeless tobacco: Never Used    Alcohol use No        Review of Systems    Physical Exam  ED Triage Vitals   Temperature Pulse Respirations Blood Pressure SpO2   12/21/17 1554 12/21/17 1552 12/21/17 1552 12/21/17 1552 12/21/17 1552   98 4 °F (36 9 °C) 89 20 156/81 97 %      Temp Source Heart Rate Source Patient Position - Orthostatic VS BP Location FiO2 (%)   12/21/17 1554 12/21/17 1552 12/21/17 1552 12/21/17 1805 --   Oral Monitor Sitting Left arm       Pain Score       12/21/17 1552       8           Orthostatic Vital Signs  Vitals:    12/21/17 1805 12/21/17 1809 12/21/17 1810 12/21/17 1811   BP: 132/91 132/91 142/90 134/65   Pulse: 85      Patient Position - Orthostatic VS: Lying Lying - Orthostatic VS Standing - Orthostatic VS Sitting - Orthostatic VS       Physical Exam    ED Medications  Medications   morphine (PF) 4 mg/mL injection 4 mg (4 mg Intravenous Given 12/21/17 1734)   sodium chloride 0 9 % bolus 1,000 mL (1,000 mL Intravenous New Bag 12/21/17 1733)   ondansetron (ZOFRAN) injection 4 mg (4 mg Intravenous Given 12/21/17 1733)       Diagnostic Studies  Results Reviewed     Procedure Component Value Units Date/Time    Lipase [[id number]]  (Abnormal) Collected:  12/21/17 1730    Lab Status:  Final result Specimen:  Blood from Arm, Right Updated:  12/21/17 1817     Lipase 2,325 (H) u/L     Comprehensive metabolic panel [[id number]]  (Abnormal) Collected:  12/21/17 1730    Lab Status:  Final result Specimen:  Blood from Arm, Right Updated:  12/21/17 1805     Sodium 141 mmol/L      Potassium 4 4 mmol/L      Chloride 105 mmol/L      CO2 26 mmol/L      Anion Gap 10 mmol/L      BUN 30 (H) mg/dL      Creatinine 2 01 (H) mg/dL      Glucose 122 mg/dL      Calcium 9 3 mg/dL      AST 25 U/L      ALT 28 U/L      Alkaline Phosphatase 137 (H) U/L      Total Protein 7 8 g/dL      Albumin 3 6 g/dL      Total Bilirubin 0 17 (L) mg/dL      eGFR 29 ml/min/1 73sq m     Narrative:         National Kidney Disease Education Program recommendations are as follows:  GFR calculation is accurate only with a steady state creatinine  Chronic Kidney disease less than 60 ml/min/1 73 sq  meters  Kidney failure less than 15 ml/min/1 73 sq  meters  POCT troponin [[id number]]  (Normal) Collected:  12/21/17 1727    Lab Status:  Final result Updated:  12/21/17 1740     POC Troponin I 0 00 ng/ml      Specimen Type VENOUS    Narrative:         [employer] i-Stat handheld analyzer 99% cutoff is > 0 08ng/mL in [facility] Emergency Departments    o cTnI 99% cutoff is useful only when applied to patients in the clinical setting of myocardial ischemia  o cTnI 99% cutoff should be interpreted in the context of clinical history, ECG findings and possibly cardiac imaging to establish correct diagnosis  o cTnI 99% cutoff may be suggestive but clearly not indicative of a coronary event without the clinical setting of myocardial ischemia      CBC and differential [[id number]]  (Normal) Collected:  12/21/17 1730    Lab Status:  Final result Specimen:  Blood from Arm, Right Updated:  12/21/17 1736     WBC 6 72 Thousand/uL      RBC 4 06 Million/uL      Hemoglobin 12 6 g/dL      Hematocrit 38 4 %      MCV 95 fL      MCH 31 0 pg      MCHC 32 8 g/dL      RDW 14 0 %      MPV 10 9 fL      Platelets 709 Thousands/uL      nRBC 0 /100 WBCs      Neutrophils Relative 65 %      Lymphocytes Relative 24 %      Monocytes Relative 10 %      Eosinophils Relative 1 %      Basophils Relative 0 %      Neutrophils Absolute 4 36 Thousands/µL      Lymphocytes Absolute 1 63 Thousands/µL      Monocytes Absolute 0 66 Thousand/µL      Eosinophils Absolute 0 06 Thousand/µL      Basophils Absolute 0 01 Thousands/µL     Urine Microscopic [[id number]]  (Abnormal) Collected:  12/21/17 1700    Lab Status:  Final result Specimen:  Urine from Urine, Clean Catch Updated:  12/21/17 1715     RBC, UA None Seen /hpf      WBC, UA 4-10 (A) /hpf      Epithelial Cells Occasional /hpf      Bacteria, UA Occasional /hpf      MUCOUS THREADS Occasional    POCT pregnancy, urine [[id number]]     Lab Status:  No result     POCT urinalysis dipstick [[id number]]  (Abnormal) Resulted:  12/21/17 1644    Lab Status:  Final result Updated:  12/21/17 1644    ED Urine Macroscopic [[id number]]  (Abnormal) Collected:  12/21/17 1700    Lab Status:  Final result Specimen:  Urine Updated:  12/21/17 1643     Color, UA Yellow     Clarity, UA Clear     pH, UA 6 0     Leukocytes, UA Trace (A)     Nitrite, UA Negative     Protein, UA >=300 (A) mg/dl      Glucose, UA Negative mg/dl      Ketones, UA Negative mg/dl      Urobilinogen, UA 0 2 E U /dl      Bilirubin, UA Negative     Blood, UA Trace (A)     Specific Gravity, UA >=1 030    Narrative:       CLINITEK RESULT                 XR chest 2 views    (Results Pending)              Procedures  ECG 12 Lead Documentation  Date/Time: 12/21/2017 7:13 PM  Performed by: Jena Mosley  Authorized by: Jena Mosley     ECG reviewed by me, the ED Provider: yes    Rate:     ECG rate:  85    ECG rate assessment: normal    Rhythm:     Rhythm: sinus rhythm    Ectopy:     Ectopy: none QRS:     QRS axis:  Normal    QRS intervals:  Normal  Conduction:     Conduction: normal    ST segments:     ST segments:  Normal  T waves:     T waves: non-specific             Phone Contacts  ED Phone Contact    ED Course  ED Course as of Dec 21 1917   Thu Dec 21, 2017   1741 WBC, UA: (!) 4-10                               MDM  CritCare Time    Disposition  Final diagnoses:   Acute pancreatitis   Chronic pain     Time reflects when diagnosis was documented in both MDM as applicable and the Disposition within this note     Time User Action Codes Description Comment    12/21/2017  7:15 PM Marilyn Burks Add [K85 90] Acute pancreatitis     12/21/2017  7:15 PM Mandeep Cavanaugh Add [G89 29] Chronic pain       ED Disposition     ED Disposition Condition Comment    Admit  Case was discussed with Hortencia and the patient's admission status was agreed to be Admission Status: inpatient status to the service of Dr Diana Frazier   Follow-up Information    None       Patient's Medications   Discharge Prescriptions    No medications on file     No discharge procedures on file      ED Provider  Electronically Signed by           Adore Alarcon MD  12/21/17 8954

## 2017-12-22 NOTE — PROGRESS NOTES
[facility] Progress Note  Patient: Zo Anderson 55 y o  female   MRN: [id number]  PCP: Krzysztof Taylor PA-C  Unit/Bed#: E2 -81 Encounter: [id number]  Date Of Visit: 12/22/17    Assessment:    Principal Problem:    Acute pancreatitis  Active Problems:    IDDM (insulin dependent diabetes mellitus) ([facility] )    Dyslipidemia    Wegener's granulomatosis ([facility] )    CKD (chronic kidney disease) stage 3, GFR 30-59 ml/min    MPA (microscopic polyangiitis) ([facility])    HTN (hypertension)    Chronic pain    Noncompliance    Bipolar 1 disorder (HCC)    Chronic flank pain      Plan:    · Acute pancreatitis slowly based on elevated lipase and abdominal pain CT imaging did not show pancreatic inflammation gallbladder ultrasound pending had actually been scheduled as outpatient but patient noncompliant to appointment subsequent lipase level 425 this morning  · Dyslipidemia continue atorvastatin will check lipid panel as possible etiology of pancreatitis  · Insulin-dependent diabetes mellitus stable presently had been on Lantus 22 units q h s  and mealtime insulin but only takes it p r n  when she is on prednisone for example  So not really a insulin-dependent diabetic and blood sugar stable here will just follow with fingerstick blood sugars  · Chronic pain in relation to microscopic polyangiitis on oxycodone and gabapentin has chronic flank pain and recently treated with Levaquin for UTI in November  · History of bipolar disorder on Adderall  · Hypertension on metoprolol 25 mg daily and lisinopril 2 5      VTE Pharmacologic Prophylaxis:   Pharmacologic: Heparin  Mechanical VTE Prophylaxis in Place: Yes    Discussions with Specialists or Other Care Team Provider:  No    Time Spent for Care: 30 minutes  More than 50% of total time spent on counseling and coordination of care as described above        Subjective:   By her description the symptoms have been recurrent and are associated with eating emesis and mid epigastric and flank pains at times was also treated recently for a urinary tract infection in relation to left-sided flank pain  She did not keep an outpatient scheduled gallbladder ultrasound appointment but promised to do so here    Objective:     Vitals:   Temp (24hrs), Av 7 °F (35 9 °C), Min:95 8 °F (35 4 °C), Max:98 4 °F (36 9 °C)    HR:  [64-89] 76  Resp:  [18-20] 18  BP: (124-164)/(65-91) 164/86  SpO2:  [93 %-97 %] 97 %  Body mass index is 37 13 kg/m²  Input and Output Summary (last 24 hours): Intake/Output Summary (Last 24 hours) at 17 0920  Last data filed at 17 1862   Gross per 24 hour   Intake              950 ml   Output              675 ml   Net              275 ml       Physical Exam:     Physical Exam:   General appearance: alert, appears stated age and cooperative  Head: Normocephalic, without obvious abnormality, atraumatic  Lungs: clear to auscultation bilaterally  Heart: regular rate and rhythm  Abdomen: Rather diffuse abdominal pain but possibly more accentuated epigastrium  Back: negative, no kyphosis present  Extremities: extremities normal, atraumatic, no cyanosis or edema  Neurologic: Grossly normal      Additional Data:     Labs:      Results from last 7 days  Lab Units 17  0457 17  1730   WBC Thousand/uL 5 08 6 72   HEMOGLOBIN g/dL 11 8 12 6   HEMATOCRIT % 35 8 38 4   PLATELETS Thousands/uL 257 294   NEUTROS PCT %  --  65   LYMPHS PCT %  --  24   MONOS PCT %  --  10   EOS PCT %  --  1       Results from last 7 days  Lab Units 17  0457 17  1730   SODIUM mmol/L 141 141   POTASSIUM mmol/L 4 1 4 4   CHLORIDE mmol/L 108 105   CO2 mmol/L 21 26   BUN mg/dL 26* 30*   CREATININE mg/dL 1 68* 2 01*   CALCIUM mg/dL 8 6 9 3   TOTAL PROTEIN g/dL  --  7 8   BILIRUBIN TOTAL mg/dL  --  0 17*   ALK PHOS U/L  --  137*   ALT U/L  --  28   AST U/L  --  25   GLUCOSE RANDOM mg/dL 100 122           * I Have Reviewed All Lab Data Listed Above    * Additional Pertinent Lab Tests Reviewed: All Labs For Current Hospital Admission Reviewed    Imaging:  Xr Chest 2 Views    Result Date: 12/21/2017  Narrative: CHEST DUAL ENERGY INDICATION:  Cough  Bilateral flank pain  Former smoker  COMPARISON:  1/18/2017 VIEWS:  PA (including soft tissue and bone algorithms) and lateral projections; 4 images FINDINGS:     Cardiomediastinal silhouette appears unremarkable  The lungs are clear  No pneumothorax or pleural effusion  Visualized osseous structures appear within normal limits for the patient's age  Impression: No active pulmonary disease  Workstation performed: [id number]     Imaging Reports Reviewed Today Include:  Reviewed chest x-ray and CT scan abdomen pelvis  Imaging Personally Reviewed by Myself Includes:  No  Procedure: Xr Chest 2 Views    Result Date: 12/21/2017  Narrative: CHEST DUAL ENERGY INDICATION:  Cough  Bilateral flank pain  Former smoker  COMPARISON:  1/18/2017 VIEWS:  PA (including soft tissue and bone algorithms) and lateral projections; 4 images FINDINGS:     Cardiomediastinal silhouette appears unremarkable  The lungs are clear  No pneumothorax or pleural effusion  Visualized osseous structures appear within normal limits for the patient's age  Impression: No active pulmonary disease  Workstation performed: [id number]        Recent Cultures (last 7 days):           Last 24 Hours Medication List:     amphetamine-dextroamphetamine 20 mg Oral BID   atorvastatin 20 mg Oral Daily   cycloSPORINE 1 drop Both Eyes BID   gabapentin 300 mg Oral TID   heparin (porcine) 5,000 Units Subcutaneous Q8H [facility]   insulin lispro 1-5 Units Subcutaneous Q6H [facility]   lisinopril 2 5 mg Oral Daily        Today, Patient Was Seen By: Chano Tompkins MD    ** Please Note: Dragon 360 Dictation voice to text software may have been used in the creation of this document   **

## 2017-12-22 NOTE — CONSULTS
Consultation - [facility]  Sherrie Serrano 55 y o  female MRN: [id number]  Unit/Bed#: E2 -01 Encounter: [id number]        ASSESSMENT/PLAN:   Abd pain  Elevated lipase  Constipation    1  Abd pain/ elevated lipase - She presented with abdominal pain & was found to have an elevated lipase  She did have N/V 2 days prior which had resolved  It is possible that she did have pancreatitis  It was previously felt that her pancreatitis was from her microscopic polyangiitis  She states she had her ANCA checked 4-5 months ago & it was WNL's  She states she is not on any medications for this currently but was on prednisone & azathioprine in the past  She denies alcohol  I do agree with checking an US to r/o stones   -Follow up on US  -Clear liquids diet & advance as tolerated    2  Constipation - It is possible her pain is related to her chronic constipation from narcotics  She has tried Miralax in the past without relief & therefore relistor was recommended  -T/c relistor    3  GERD - she does have a hx of GERD  Last EGD 2 years ago  Recommend PPI & if sxs persist can consider outpt EGD  -Protonix daily      Consults    Reason for Consult / Principal Problem: Acute pancreatitis    HPI: Sherrie Serrano is a 55y o  year old female with a PMH of CKD, COPD, diabetes, HTN microscopic polyangiitis who has chronic pain presents with left flank pain & right flank/ RUQ pain  She states 2-3 days ago she had N/V but this has resolved  She states she always has some degree of abd pain, typically on the left but this was worse  On admission to the hospital her lipase was 2,300, but today 400  She has had elevations in the past usually in the 400 range  She did not have a CT this admission & all of her previous CT's didn't show pancreatitis but they also did not use contrast b/c of her CKD  She was seen in our office in Sept for epigastric pain & constipation   It was recommended that she have a US & EGD to evaluate her sxs but she states she became overwhelmed & canceled them  She does admit to GERD but doesn't take anything for it  She did have an EGD Sept 2015 showing antral gastritis  Bx (-) for HP  She has chronic constipation from opioids  Relistor was recommended for constipation but she is not taking this either  She states she is feeling much better & wants to eat  Review of Systems: as per HPI  Review of Systems   All other systems reviewed and are negative        Historical Information   Past Medical History:   Diagnosis Date    Anemia of chronic disease     Anxiety     Asthma     Asthma     Chronic abdominal pain     CKD (chronic kidney disease) stage 3, GFR 30-59 ml/min     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     [facility]     [facility]     DVT (deep venous thrombosis) ([place] [address] )     History of acute pancreatitis     felt secondary to Bactrim    HTN (hypertension)     Hyperlipidemia     Hypertension     Microscopic polyangiitis (HCC)     MPA (microscopic polyangiitis) (HCC)     Renal disorder     Self-inflicted injury     self inflicted skin wounds    Wegener's granulomatosis with renal involvement ([place] [address] ) 2015     Past Surgical History:   Procedure Laterality Date    ESOPHAGOGASTRODUODENOSCOPY  09/11/2015    mild antral gastritis     Social History   History   Alcohol Use No     History   Drug Use    Types: Marijuana     History   Smoking Status    Former Smoker    Types: Cigarettes    Quit date: 2010   Smokeless Tobacco    Never Used     Family History   Problem Relation Age of Onset    Colon cancer Neg Hx        Meds/Allergies     Prescriptions Prior to Admission   Medication    amphetamine-dextroamphetamine (ADDERALL) 20 mg tablet    atorvastatin (LIPITOR) 20 mg tablet    cycloSPORINE (RESTASIS) 0 05 % ophthalmic emulsion    gabapentin (NEURONTIN) 100 mg capsule    insulin aspart (NovoLOG) 100 units/mL injection    insulin glargine (LANTUS) 100 units/mL subcutaneous injection    metoprolol succinate (TOPROL-XL) 25 mg 24 hr tablet    ondansetron (ZOFRAN) 4 mg tablet    oxyCODONE-acetaminophen (PERCOCET) 7 5-325 MG per tablet    polyvinyl alcohol-povidone (REFRESH) 1 4-0 6 % ophthalmic solution     Current Facility-Administered Medications   Medication Dose Route Frequency    amphetamine-dextroamphetamine (ADDERALL) tablet 20 mg  20 mg Oral BID    atorvastatin (LIPITOR) tablet 20 mg  20 mg Oral Daily    cycloSPORINE (RESTASIS) 0 05 % ophthalmic emulsion 1 drop  1 drop Both Eyes BID    gabapentin (NEURONTIN) capsule 300 mg  300 mg Oral TID    heparin (porcine) subcutaneous injection 5,000 Units  5,000 Units Subcutaneous Q8H [facility]    insulin lispro (HumaLOG) 100 units/mL subcutaneous injection 1-5 Units  1-5 Units Subcutaneous Q6H [facility]    lisinopril (ZESTRIL) tablet 2 5 mg  2 5 mg Oral Daily    morphine injection 2 mg  2 mg Intravenous Q4H PRN    ondansetron (ZOFRAN) injection 4 mg  4 mg Intravenous Q6H PRN    sodium chloride 0 9 % infusion  75 mL/hr Intravenous Continuous    tiZANidine (ZANAFLEX) tablet 4 mg  4 mg Oral Q8H PRN       Allergies   Allergen Reactions    Bactrim [Sulfamethoxazole-Trimethoprim]      Pt "They think that is what cause the pancreatitis"     Haldol [Haloperidol] Other (See Comments)     "I don't like it"    Ibuprofen     Navane [Thiothixene]      SI    Other      "novaine?" antipsychotic    Prozac [Fluoxetine Hcl]      SI    Lexapro [Escitalopram Oxalate] Rash       Objective     Blood pressure 164/86, pulse 76, temperature (!) 96 6 °F (35 9 °C), temperature source Tympanic, resp  rate 18, height 5' 2" (1 575 m), weight 92 1 kg (203 lb), SpO2 97 %        Intake/Output Summary (Last 24 hours) at 12/22/17 0951  Last data filed at 12/22/17 7690   Gross per 24 hour   Intake              950 ml   Output              675 ml   Net              275 ml       PHYSICAL EXAM     Physical Exam   Constitutional: She is oriented to person, place, and time  She appears well-developed and well-nourished  obese   HENT:   Head: Normocephalic and atraumatic  Eyes: Conjunctivae are normal    Neck: Normal range of motion  Cardiovascular: Normal rate and regular rhythm  Pulmonary/Chest: Effort normal and breath sounds normal    Abdominal: Soft  Bowel sounds are normal  She exhibits no distension  There is no tenderness  Musculoskeletal: Normal range of motion  Neurological: She is alert and oriented to person, place, and time  Skin: Skin is warm and dry  Psychiatric: She has a normal mood and affect  Her behavior is normal        Lab Results:   CBC: Lab Results   Component Value Date    WBC 5 08 12/22/2017    HGB 11 8 12/22/2017    HCT 35 8 12/22/2017    MCV 96 12/22/2017     12/22/2017    MCH 31 5 12/22/2017    MCHC 33 0 12/22/2017    RDW 14 0 12/22/2017    MPV 10 6 12/22/2017    NRBC 0 12/21/2017   ,   CMP: Lab Results   Component Value Date     12/22/2017    K 4 1 12/22/2017     12/22/2017    CO2 21 12/22/2017    ANIONGAP 12 12/22/2017    BUN 26 (H) 12/22/2017    CREATININE 1 68 (H) 12/22/2017    GLUCOSE 100 12/22/2017    CALCIUM 8 6 12/22/2017    AST 25 12/21/2017    ALT 28 12/21/2017    ALKPHOS 137 (H) 12/21/2017    PROT 7 8 12/21/2017    ALBUMIN 3 6 12/21/2017    BILITOT 0 17 (L) 12/21/2017    EGFR 36 12/22/2017   ,   Lipase:   Lab Results   Component Value Date    LIPASE 425 (H) 12/22/2017   ,  Imaging Studies: I have personally reviewed pertinent reports        CXR:  No active pulmonary disease

## 2017-12-22 NOTE — PLAN OF CARE
Patient continues to have intractable epigastric pain right upper quadrant pain and emesis and nausea gallbladder ultrasound within normal limits without biliary dilatation I will order a HIDA scan to further assess with ejection

## 2017-12-22 NOTE — PLAN OF CARE
MUSCULOSKELETAL - ADULT     Maintain or return mobility to safest level of function Progressing     Maintain proper alignment of affected body part Progressing        PAIN - ADULT     Verbalizes/displays adequate comfort level or baseline comfort level Progressing        Potential for Falls     Patient will remain free of falls Progressing

## 2017-12-23 LAB
GLUCOSE SERPL-MCNC: 103 MG/DL (ref 65–140)
GLUCOSE SERPL-MCNC: 108 MG/DL (ref 65–140)
GLUCOSE SERPL-MCNC: 121 MG/DL (ref 65–140)
GLUCOSE SERPL-MCNC: 95 MG/DL (ref 65–140)
LIPASE SERPL-CCNC: 282 U/L (ref 73–393)

## 2017-12-23 PROCEDURE — 97163 PT EVAL HIGH COMPLEX 45 MIN: CPT

## 2017-12-23 PROCEDURE — G8979 MOBILITY GOAL STATUS: HCPCS

## 2017-12-23 PROCEDURE — 82948 REAGENT STRIP/BLOOD GLUCOSE: CPT

## 2017-12-23 PROCEDURE — G8978 MOBILITY CURRENT STATUS: HCPCS

## 2017-12-23 PROCEDURE — 83690 ASSAY OF LIPASE: CPT | Performed by: INTERNAL MEDICINE

## 2017-12-23 PROCEDURE — 86255 FLUORESCENT ANTIBODY SCREEN: CPT | Performed by: INTERNAL MEDICINE

## 2017-12-23 RX ORDER — QUETIAPINE FUMARATE 25 MG/1
50 TABLET, FILM COATED ORAL
Status: DISCONTINUED | OUTPATIENT
Start: 2017-12-23 | End: 2017-12-25 | Stop reason: HOSPADM

## 2017-12-23 RX ORDER — KETOTIFEN FUMARATE 0.35 MG/ML
1 SOLUTION/ DROPS OPHTHALMIC 2 TIMES DAILY
Status: DISCONTINUED | OUTPATIENT
Start: 2017-12-23 | End: 2017-12-25 | Stop reason: HOSPADM

## 2017-12-23 RX ADMIN — GABAPENTIN 300 MG: 300 CAPSULE ORAL at 16:17

## 2017-12-23 RX ADMIN — HEPARIN SODIUM 5000 UNITS: 5000 INJECTION, SOLUTION INTRAVENOUS; SUBCUTANEOUS at 05:03

## 2017-12-23 RX ADMIN — HEPARIN SODIUM 5000 UNITS: 5000 INJECTION, SOLUTION INTRAVENOUS; SUBCUTANEOUS at 13:20

## 2017-12-23 RX ADMIN — QUETIAPINE FUMARATE 50 MG: 25 TABLET ORAL at 22:45

## 2017-12-23 RX ADMIN — ONDANSETRON 4 MG: 2 INJECTION INTRAMUSCULAR; INTRAVENOUS at 19:29

## 2017-12-23 RX ADMIN — KETOTIFEN FUMARATE 1 DROP: 0.35 SOLUTION/ DROPS OPHTHALMIC at 18:26

## 2017-12-23 RX ADMIN — ONDANSETRON 4 MG: 2 INJECTION INTRAMUSCULAR; INTRAVENOUS at 12:22

## 2017-12-23 RX ADMIN — ONDANSETRON 4 MG: 2 INJECTION INTRAMUSCULAR; INTRAVENOUS at 01:32

## 2017-12-23 RX ADMIN — MORPHINE SULFATE 2 MG: 2 INJECTION, SOLUTION INTRAMUSCULAR; INTRAVENOUS at 05:30

## 2017-12-23 RX ADMIN — LISINOPRIL 2.5 MG: 5 TABLET ORAL at 09:24

## 2017-12-23 RX ADMIN — CYCLOSPORINE 1 DROP: 0.5 EMULSION OPHTHALMIC at 09:26

## 2017-12-23 RX ADMIN — GABAPENTIN 300 MG: 300 CAPSULE ORAL at 20:39

## 2017-12-23 RX ADMIN — DEXTROAMPHETAMINE SACCHARATE, AMPHETAMINE ASPARTATE, DEXTROAMPHETAMINE SULFATE AND AMPHETAMINE SULFATE 20 MG: 2.5; 2.5; 2.5; 2.5 TABLET ORAL at 12:10

## 2017-12-23 RX ADMIN — HYDROMORPHONE HYDROCHLORIDE 0.5 MG: 1 INJECTION, SOLUTION INTRAMUSCULAR; INTRAVENOUS; SUBCUTANEOUS at 22:41

## 2017-12-23 RX ADMIN — ATORVASTATIN CALCIUM 20 MG: 20 TABLET, FILM COATED ORAL at 09:24

## 2017-12-23 RX ADMIN — MORPHINE SULFATE 2 MG: 2 INJECTION, SOLUTION INTRAMUSCULAR; INTRAVENOUS at 09:24

## 2017-12-23 RX ADMIN — SODIUM CHLORIDE 75 ML/HR: 0.9 INJECTION, SOLUTION INTRAVENOUS at 04:12

## 2017-12-23 RX ADMIN — MORPHINE SULFATE 2 MG: 2 INJECTION, SOLUTION INTRAMUSCULAR; INTRAVENOUS at 12:24

## 2017-12-23 RX ADMIN — ONDANSETRON 4 MG: 2 INJECTION INTRAMUSCULAR; INTRAVENOUS at 09:24

## 2017-12-23 RX ADMIN — MORPHINE SULFATE 2 MG: 2 INJECTION, SOLUTION INTRAMUSCULAR; INTRAVENOUS at 20:39

## 2017-12-23 RX ADMIN — MORPHINE SULFATE 2 MG: 2 INJECTION, SOLUTION INTRAMUSCULAR; INTRAVENOUS at 16:24

## 2017-12-23 RX ADMIN — PANTOPRAZOLE SODIUM 40 MG: 40 TABLET, DELAYED RELEASE ORAL at 05:03

## 2017-12-23 RX ADMIN — MORPHINE SULFATE 2 MG: 2 INJECTION, SOLUTION INTRAMUSCULAR; INTRAVENOUS at 01:32

## 2017-12-23 RX ADMIN — HEPARIN SODIUM 5000 UNITS: 5000 INJECTION, SOLUTION INTRAVENOUS; SUBCUTANEOUS at 22:46

## 2017-12-23 RX ADMIN — GABAPENTIN 300 MG: 300 CAPSULE ORAL at 09:25

## 2017-12-23 RX ADMIN — DEXTROAMPHETAMINE SACCHARATE, AMPHETAMINE ASPARTATE, DEXTROAMPHETAMINE SULFATE AND AMPHETAMINE SULFATE 20 MG: 2.5; 2.5; 2.5; 2.5 TABLET ORAL at 18:25

## 2017-12-23 NOTE — SOCIAL WORK
CM met with patient at bedside to address discharge needs  Patient reports living in a first floor apartment alone  Patient was independent with ADLs and functional mobility  Patient reports using an electric scooter, patient also has a RW and BSC, patient will need a shower chair at discharge; CM explained to patient that it may not be covered by insurance, however, CM will submit if patient is still interested; patient will inform CM of decision  Patient uses [employer] to get to appointments; patient will need transportation arranged home  No POA identified; patient declined information at this time  Patient reports a strong support system as her friends a live within blocks and are available as needed  Patient is interested in referral to Waiver Program; CM will make referral when patient is discharged  Patient also recommended for Home PT; patient reports a hx of using [facility] and would like referral sent back to agency; referral submitted  Patient made aware of CM's name and role  No other needs at present  Cm to follow as needed

## 2017-12-23 NOTE — PLAN OF CARE
Problem: DISCHARGE PLANNING - CARE MANAGEMENT  Goal: Discharge to post-acute care or home with appropriate resources  INTERVENTIONS:  - Conduct assessment to determine patient/family and health care team treatment goals, and need for post-acute services based on payer coverage, community resources, and patient preferences, and barriers to discharge  - Address psychosocial, clinical, and financial barriers to discharge as identified in assessment in conjunction with the patient/family and health care team  - Arrange appropriate level of post-acute services according to patients   needs and preference and payer coverage in collaboration with the physician and health care team  - Communicate with and update the patient/family, physician, and health care team regarding progress on the discharge plan  - Arrange appropriate transportation to post-acute venues  Outcome: Adequate for Discharge  Patient to discharge with appropriate services when medically cleared  CM to follow as needed

## 2017-12-23 NOTE — PLAN OF CARE
Problem: PHYSICAL THERAPY ADULT  Goal: Performs mobility at highest level of function for planned discharge setting  See evaluation for individualized goals  Treatment/Interventions: Functional transfer training, LE strengthening/ROM, Elevations, Therapeutic exercise, Endurance training, Patient/family training, Bed mobility, Gait training, Spoke to nursing, Family  Equipment Recommended: Other (Comment) (shower chair)       See flowsheet documentation for full assessment, interventions and recommendations  Prognosis: Fair  Problem List: Decreased strength, Decreased endurance, Impaired balance, Decreased mobility, Decreased skin integrity, Obesity  Assessment: pt is a 45y/o f who presents to [facility] c 4-5 day h/o abdominal pain  dx: acute pancreatitis  PMH significant for IDDM, HTN, CKD III, bipolar, cushings disease, anxiety, + medical non-compliance  at baseline, pt lives alone in 1 story home c 6+1 YOLANDA  reports being mod (I) c functional mobility c RW limited distances "on a good day"  reports primarily using electric scooter + bottom scooting up stairs into home  admits to h/o >10 falls in past 6 months  presents c deficits in strength, balance, gait quality, + activity tolerance noted in PT exam above  Barthel Index 70/100  able to complete transitions c (S), however requires min (A)x1 for ambulation c RW  tolerated 3' laterally at EOB limited by rapid muscular fatigue  gait deviations noted above increase risk for falls  would benefit from skilled PT to maximize functional mobility + return to PLOF  upon d/c, recommend pt return home c hhpt  pt also inquiring about increased HHA (A)  would also benefit from shower chair 2* rapid fatigue in standing  Barriers to Discharge: Inaccessible home environment, Decreased caregiver support     Recommendation: Home PT     PT - OK to Discharge: Yes    See flowsheet documentation for full assessment

## 2017-12-23 NOTE — PHYSICAL THERAPY NOTE
PT Evaluation (25min)  (12:45-13:10)    Past Medical History:   Diagnosis Date    Anemia of chronic disease     Anxiety     Asthma     Asthma     Chronic abdominal pain     CKD (chronic kidney disease) stage 3, GFR 30-59 ml/min     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     Cushing syndrome [facility]     Diabetes mellitus ([facility] )     [facility]     History of acute pancreatitis     felt secondary to Bactrim    HTN (hypertension)     Hyperlipidemia     Hypertension     Microscopic polyangiitis (HCC)     MPA (microscopic polyangiitis) (HCC)     Renal disorder     Self-inflicted injury     self inflicted skin wounds    [facility]'s [facility] 2015 12/23/17 1310   Note Type   Note type Eval only   Pain Assessment   Pain Assessment No/denies pain   Home Living   Type of [address] One level;Stairs to enter with rails  (6+1 YOLANDA; pt reports bottom scooting on stairs)   [address] Walker;Cane;Electric scooter   Additional Comments pt reports needing shower chair   Prior Function   Level of [place] Independent with ADLs and functional mobility  (primarily uses electric scooter for mobility)   Lives With Alone   Receives Help From Friend(s)  (supportive friend lives 2 blks away)   ADL Assistance Independent  (pt sponge bathes)   Falls in the last 6 months >10   Comments pt reports being able to ambulate min distances c RW "on a good day"  (I) transfers self <> electric w/c  Restrictions/Precautions   Other Precautions Multiple lines;Limb alert; Fall Risk  (L UE limb alert)   General   Additional Pertinent History pt presents to [facility] c 4-5 day h/o abdominal pain  dx: acute pancreatitis  PT consulted for mobility + d/c planning  up c (A)     Family/Caregiver Present Yes   Cognition   Orientation Level Oriented X4   RUE Assessment   RUE Assessment WFL  (4-/5)   LUE Assessment   LUE Assessment WFL  (4-/5)   RLE Assessment   RLE Assessment WFL  (3/5)   LLE Assessment   LLE Assessment WFL  (3/5)   Coordination   Movements are Fluid and Coordinated 1   Sensation WFL   Bed Mobility   Supine to Sit 6  Modified independent   Additional items HOB elevated   Sit to Supine 6  Modified independent   Additional items HOB elevated   Transfers   Sit to Stand 5  Supervision   Additional items Verbal cues   Stand to Sit 5  Supervision   Additional items Verbal cues   Ambulation/Elevation   Gait pattern Improper Weight shift; Poor UE support;Decreased foot clearance  (rapidly fatigues)   Gait Assistance 4  Minimal assist   Additional items Assist x 1;Verbal cues   Assistive Device Rolling walker   Distance 3' laterally at EOB; limited by rapid fatigue   Balance   Static Sitting Good   Dynamic Sitting Good   Static Standing Fair -   Dynamic Standing Poor +   Ambulatory Poor +   Activity Tolerance   Activity Tolerance Patient limited by fatigue   Nurse Made Aware Pranay   Assessment   Prognosis Fair   Problem List Decreased strength;Decreased endurance; Impaired balance;Decreased mobility; Decreased skin integrity;Obesity   Assessment pt is a 47y/o f who presents to [facility] c 4-5 day h/o abdominal pain  dx: acute pancreatitis  PMH significant for IDDM, HTN, CKD III, bipolar, cushings disease, anxiety, + medical non-compliance  at baseline, pt lives alone in 1 story home c 6+1 YOLANDA  reports being mod (I) c functional mobility c RW limited distances "on a good day"  reports primarily using electric scooter + bottom scooting up stairs into home  admits to h/o >10 falls in past 6 months  presents c deficits in strength, balance, gait quality, + activity tolerance noted in PT exam above  Barthel Index 70/100  able to complete transitions c (S), however requires min (A)x1 for ambulation c RW  tolerated 3' laterally at EOB limited by rapid muscular fatigue  gait deviations noted above increase risk for falls   would benefit from skilled PT to maximize functional mobility + return to PLOF  upon d/c, recommend pt return home c hhpt  pt also inquiring about increased HHA (A)  would also benefit from shower chair 2* rapid fatigue in standing  Barriers to Discharge Inaccessible home environment;Decreased caregiver support   Goals   Patient Goals "to go home"  [facility] Expiration Date 01/02/18   Short Term Goal #1 1  increase strength 1/2 grade to improve overall functional mobility, 2  perform transfers mod (I) to safely get on/off BSC, 3  ambulate 25' c RW mod (I) s overt loss of balance to access bathroom, 4  negotiate 6 stairs c (S) to safely enter home, 5  increase standing tolerance to 10min to take a shower   Plan   Treatment/Interventions Functional transfer training;LE strengthening/ROM; Elevations; Therapeutic exercise; Endurance training;Patient/family training;Bed mobility;Gait training;Spoke to nursing;Family   PT Frequency 5x/wk   Recommendation   Recommendation Home PT   Equipment Recommended Other (Comment)  (shower chair)   PT - OK to Discharge Yes   Barthel Index   Feeding 10   Bathing 5   Grooming Score 5   Dressing Score 10   Bladder Score 10   Bowels Score 10   Toilet Use Score 10   Transfers (Bed/Chair) Score 10   Mobility (Level Surface) Score 0   Stairs Score 0   Barthel Index Score 70     Scott Jenkins, PT

## 2017-12-23 NOTE — PHYSICAL THERAPY NOTE
PT Evaluation (25min)  (12:45-13:10)    Past Medical History:   Diagnosis Date    Anemia of chronic disease     Anxiety     Asthma     Asthma     Chronic abdominal pain     CKD (chronic kidney disease) stage 3, GFR 30-59 ml/min     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     Cushing syndrome ([facility]     Diabetes mellitus ([facility] )     DVT (deep venous thrombosis) ([facility] )     History of acute pancreatitis     felt secondary to Bactrim    HTN (hypertension)     Hyperlipidemia     Hypertension     Microscopic polyangiitis (HCC)     MPA (microscopic polyangiitis) (HCC)     Renal disorder     Self-inflicted injury     self inflicted skin wounds    Wegener's granulomatosis with renal involvement ([email or web address] ) 2015 12/23/17 1310   Note Type   Note type Eval only   Pain Assessment   Pain Assessment No/denies pain   Home Living   Type of [address] [facility] One level;Stairs to enter with rails; Laundry in basement  (6+1 YOLANDA; pt reports bottom scooting on stairs)   [address] [employer] Walker;Cane;Electric scooter   Additional Comments pt reports needing shower chair   Prior Function   Level of [place] Independent with ADLs and functional mobility  (primarily uses electric scooter for mobility)   Lives With Alone   Receives Help From Friend(s)  (supportive friend lives 2 blks away)   ADL Assistance Independent  (pt sponge bathes)   Falls in the last 6 months >10   Comments pt reports being able to ambulate min distances c RW "on a good day"  (I) transfers self <> electric w/c  Restrictions/Precautions   Other Precautions Multiple lines;Limb alert; Fall Risk  (L UE limb alert)   General   Additional Pertinent History pt presents to [facility] c 4-5 day h/o abdominal pain  dx: acute pancreatitis  PT consulted for mobility + d/c planning  up c (A)     Family/Caregiver Present Yes   Cognition   Orientation Level Oriented X4   RUE Assessment   RUE Assessment WFL  (4-/5)   LUE Assessment   LUE Assessment WFL  (4-/5)   RLE Assessment   RLE Assessment WFL  (3/5)   LLE Assessment   LLE Assessment WFL  (3/5)   Coordination   Movements are Fluid and Coordinated 1   Sensation WFL   Bed Mobility   Supine to Sit 6  Modified independent   Additional items HOB elevated   Sit to Supine 6  Modified independent   Additional items HOB elevated   Transfers   Sit to Stand 5  Supervision   Additional items Verbal cues   Stand to Sit 5  Supervision   Additional items Verbal cues   Ambulation/Elevation   Gait pattern Improper Weight shift; Poor UE support;Decreased foot clearance  (rapidly fatigues)   Gait Assistance 4  Minimal assist   Additional items Assist x 1;Verbal cues   Assistive Device Rolling walker   Distance 3' laterally at EOB; limited by rapid fatigue   Balance   Static Sitting Good   Dynamic Sitting Good   Static Standing Fair -   Dynamic Standing Poor +   Ambulatory Poor +   Activity Tolerance   Activity Tolerance Patient limited by fatigue   Nurse Made Aware Pranay   Assessment   Prognosis Fair   Problem List Decreased strength;Decreased endurance; Impaired balance;Decreased mobility; Decreased skin integrity;Obesity   Assessment pt is a 47y/o f who presents to [facility] c 4-5 day h/o abdominal pain  dx: acute pancreatitis  PMH significant for IDDM, HTN, CKD III, bipolar, cushings disease, anxiety, + medical non-compliance  at baseline, pt lives alone in 1 story home c 6+1 YOLANDA  reports being mod (I) c functional mobility c RW limited distances "on a good day"  reports primarily using electric scooter + bottom scooting up stairs into home  admits to h/o >10 falls in past 6 months  presents c deficits in strength, balance, gait quality, + activity tolerance noted in PT exam above  Barthel Index 70/100  able to complete transitions c (S), however requires min (A)x1 for ambulation c RW  tolerated 3' laterally at EOB limited by rapid muscular fatigue  gait deviations noted above increase risk for falls   would benefit from skilled PT to maximize functional mobility + return to PLOF  upon d/c, recommend pt return home c hhpt  pt also inquiring about increased HHA (A)  would also benefit from shower chair 2* rapid fatigue in standing  PT eval of high complexity 2* unstable med status c pt undergoing further medical management 2* acute pancreatitis  pt c multiple co-morbidities impacting PT including medical non-compliance, bipolar, + cushings disease  pt c decline in mobility from baseline requiring min (A)x1 2* above mobility deficits  resides alone in house c 6+1 YOLANDA + laundry in basement  Barriers to Discharge Inaccessible home environment;Decreased caregiver support   Goals   Patient Goals "to go home"  STG Expiration Date 01/02/18   Short Term Goal #1 1  increase strength 1/2 grade to improve overall functional mobility, 2  perform transfers mod (I) to safely get on/off BSC, 3  ambulate 25' c RW mod (I) s overt loss of balance to access bathroom, 4  negotiate 6 stairs c (S) to safely enter home, 5  increase standing tolerance to 10min to take a shower   Plan   Treatment/Interventions Functional transfer training;LE strengthening/ROM; Elevations; Therapeutic exercise; Endurance training;Patient/family training;Bed mobility;Gait training;Spoke to nursing;Family   PT Frequency 5x/wk   Recommendation   Recommendation Home PT   Equipment Recommended Other (Comment)  (shower chair)   PT - OK to Discharge Yes   Barthel Index   Feeding 10   Bathing 5   Grooming Score 5   Dressing Score 10   Bladder Score 10   Bowels Score 10   Toilet Use Score 10   Transfers (Bed/Chair) Score 10   Mobility (Level Surface) Score 0   Stairs Score 0   Barthel Index Score 70     Aixa Donaldson, PT

## 2017-12-23 NOTE — PROGRESS NOTES
[facility] Internal Medicine Progress Note  Patient: Jovani Craven 55 y o  female   MRN: [id number]  PCP: Day Kincaid PA-C  Unit/Bed#: E2 -05 Encounter: [id number]  Date Of Visit: 12/23/17    Assessment:    Principal Problem:    Acute pancreatitis  Active Problems:    IDDM (insulin dependent diabetes mellitus) ([facility] )    Dyslipidemia    Wegener's granulomatosis ([facility] )    CKD (chronic kidney disease) stage 3, GFR 30-59 ml/min    MPA (microscopic polyangiitis) (HCC)    HTN (hypertension)    Chronic pain    Noncompliance    Bipolar 1 disorder (HCC)    Chronic flank pain      Plan:    · Acute pancreatitis slowly based on elevated lipase and abdominal pain CT imaging did not show pancreatic inflammation gallbladder ultrasound within normal limits had actually been scheduled as outpatient but patient noncompliant to appointment subsequent lipase level 425 t yesterday trending down continues to have nausea vomiting less abdominal pain  · Dyslipidemia continue atorvastatin will check lipid panel as possible etiology of pancreatitis  · Insulin-dependent diabetes mellitus stable presently had been on Lantus 22 units q h s  and mealtime insulin but only takes it p r n  when she is on prednisone for example    So not really a insulin-dependent diabetic and blood sugar stable here will just follow with fingerstick blood sugars  · Chronic pain in relation to microscopic polyangiitis(MPA) on oxycodone and gabapentin has chronic flank pain will check repeat ANCA she continues to have reproducible left-sided intercostal pain and subcostal pain on palpation as prior  · Acute in kidney injury in the setting of chronic kidney disease and Justa's granulomatosis(MPA) has been noncompliant to follow-up for immune suppressive therapy apparently some improvement in renal function with IV hydration for from entrance creatinine of 2 0  · History of bipolar disorder on Adderall  · Hypertension on metoprolol 25 mg daily and lisinopril 2  5      VTE Pharmacologic Prophylaxis:   Pharmacologic: Heparin  Mechanical VTE Prophylaxis in Place: Yes    Discussions with Specialists or Other Care Team Provider:  No    Time Spent for Care: 30 minutes  More than 50% of total time spent on counseling and coordination of care as described above  Subjective:   By her description the symptoms have been recurrent and are associated with eating emesis and mid epigastric and flank pains at times was also treated recently for a urinary tract infection in relation to left-sided flank pain  She did not keep an outpatient scheduled gallbladder ultrasound appointment in apparently by the record also has not kept appointments for follow-up for her treatment of MP a and immunesuppression therapy    Objective:     Vitals:   Temp (24hrs), Av °F (36 1 °C), Min:96 8 °F (36 °C), Max:97 3 °F (36 3 °C)    HR:  [] 99  Resp:  [18] 18  BP: (150-183)/() 170/110  SpO2:  [96 %-99 %] 97 %  Body mass index is 37 13 kg/m²  Input and Output Summary (last 24 hours):        Intake/Output Summary (Last 24 hours) at 17 1010  Last data filed at 17 0601   Gross per 24 hour   Intake           1502 5 ml   Output             3050 ml   Net          -1547 5 ml       Physical Exam:     Physical Exam:   General appearance: alert, appears stated age and cooperative  Head: Normocephalic, without obvious abnormality, atraumatic  Lungs: clear to auscultation bilaterally  Heart: regular rate and rhythm  Abdomen: Rather diffuse abdominal pain but possibly more accentuated epigastrium  Back: negative, no kyphosis present  Extremities: extremities normal, atraumatic, no cyanosis or edema  Neurologic: Grossly normal      Additional Data:     Labs:      Results from last 7 days  Lab Units 17  0457 17  1730   WBC Thousand/uL 5 08 6 72   HEMOGLOBIN g/dL 11 8 12 6   HEMATOCRIT % 35 8 38 4   PLATELETS Thousands/uL 257 294   NEUTROS PCT %  --  65   LYMPHS PCT %  --  24 MONOS PCT %  --  10   EOS PCT %  --  1       Results from last 7 days  Lab Units 12/22/17  0457 12/21/17  1730   SODIUM mmol/L 141 141   POTASSIUM mmol/L 4 1 4 4   CHLORIDE mmol/L 108 105   CO2 mmol/L 21 26   BUN mg/dL 26* 30*   CREATININE mg/dL 1 68* 2 01*   CALCIUM mg/dL 8 6 9 3   TOTAL PROTEIN g/dL  --  7 8   BILIRUBIN TOTAL mg/dL  --  0 17*   ALK PHOS U/L  --  137*   ALT U/L  --  28   AST U/L  --  25   GLUCOSE RANDOM mg/dL 100 122           * I Have Reviewed All Lab Data Listed Above  * Additional Pertinent Lab Tests Reviewed: All Labs For Current Hospital Admission Reviewed    Imaging:  Xr Chest 2 Views    Result Date: 12/21/2017  Narrative: CHEST DUAL ENERGY INDICATION:  Cough  Bilateral flank pain  Former smoker  COMPARISON:  1/18/2017 VIEWS:  PA (including soft tissue and bone algorithms) and lateral projections; 4 images FINDINGS:     Cardiomediastinal silhouette appears unremarkable  The lungs are clear  No pneumothorax or pleural effusion  Visualized osseous structures appear within normal limits for the patient's age  Impression: No active pulmonary disease  Workstation performed: [id number]     Imaging Reports Reviewed Today Include:  Reviewed chest x-ray and CT scan abdomen pelvis  Imaging Personally Reviewed by Myself Includes:  No  Procedure: Xr Chest 2 Views    Result Date: 12/21/2017  Narrative: CHEST DUAL ENERGY INDICATION:  Cough  Bilateral flank pain  Former smoker  COMPARISON:  1/18/2017 VIEWS:  PA (including soft tissue and bone algorithms) and lateral projections; 4 images FINDINGS:     Cardiomediastinal silhouette appears unremarkable  The lungs are clear  No pneumothorax or pleural effusion  Visualized osseous structures appear within normal limits for the patient's age  Impression: No active pulmonary disease   Workstation performed: [id number]        Recent Cultures (last 7 days):           Last 24 Hours Medication List:     amphetamine-dextroamphetamine 20 mg Oral BID atorvastatin 20 mg Oral Daily   cycloSPORINE 1 drop Both Eyes BID   gabapentin 300 mg Oral TID   heparin (porcine) 5,000 Units Subcutaneous Q8H [address]   insulin lispro 1-5 Units Subcutaneous Q6H PAULINE   lisinopril 2 5 mg Oral Daily   methylnaltrexone 6 mg Subcutaneous Every Other Day   pantoprazole 40 mg Oral Early Morning   QUEtiapine 50 mg Oral HS        Today, Patient Was Seen By: Rashard Wen MD    ** Please Note: Dragon 360 Dictation voice to text software may have been used in the creation of this document   **

## 2017-12-24 LAB
ALBUMIN SERPL BCP-MCNC: 3.3 G/DL (ref 3.5–5)
ALP SERPL-CCNC: 109 U/L (ref 46–116)
ALT SERPL W P-5'-P-CCNC: 30 U/L (ref 12–78)
ANION GAP SERPL CALCULATED.3IONS-SCNC: 10 MMOL/L (ref 4–13)
AST SERPL W P-5'-P-CCNC: 29 U/L (ref 5–45)
BILIRUB SERPL-MCNC: 0.31 MG/DL (ref 0.2–1)
BUN SERPL-MCNC: 15 MG/DL (ref 5–25)
CALCIUM SERPL-MCNC: 9.3 MG/DL (ref 8.3–10.1)
CHLORIDE SERPL-SCNC: 106 MMOL/L (ref 100–108)
CO2 SERPL-SCNC: 24 MMOL/L (ref 21–32)
CREAT SERPL-MCNC: 1.7 MG/DL (ref 0.6–1.3)
GFR SERPL CREATININE-BSD FRML MDRD: 36 ML/MIN/1.73SQ M
GLUCOSE SERPL-MCNC: 101 MG/DL (ref 65–140)
GLUCOSE SERPL-MCNC: 102 MG/DL (ref 65–140)
GLUCOSE SERPL-MCNC: 105 MG/DL (ref 65–140)
GLUCOSE SERPL-MCNC: 116 MG/DL (ref 65–140)
GLUCOSE SERPL-MCNC: 89 MG/DL (ref 65–140)
GLUCOSE SERPL-MCNC: 93 MG/DL (ref 65–140)
GLUCOSE SERPL-MCNC: 97 MG/DL (ref 65–140)
POTASSIUM SERPL-SCNC: 3.9 MMOL/L (ref 3.5–5.3)
PROT SERPL-MCNC: 7.3 G/DL (ref 6.4–8.2)
SODIUM SERPL-SCNC: 140 MMOL/L (ref 136–145)

## 2017-12-24 PROCEDURE — 82948 REAGENT STRIP/BLOOD GLUCOSE: CPT

## 2017-12-24 PROCEDURE — 80053 COMPREHEN METABOLIC PANEL: CPT | Performed by: INTERNAL MEDICINE

## 2017-12-24 RX ADMIN — HEPARIN SODIUM 5000 UNITS: 5000 INJECTION, SOLUTION INTRAVENOUS; SUBCUTANEOUS at 21:13

## 2017-12-24 RX ADMIN — MORPHINE SULFATE 2 MG: 2 INJECTION, SOLUTION INTRAMUSCULAR; INTRAVENOUS at 18:48

## 2017-12-24 RX ADMIN — MORPHINE SULFATE 2 MG: 2 INJECTION, SOLUTION INTRAMUSCULAR; INTRAVENOUS at 14:02

## 2017-12-24 RX ADMIN — ONDANSETRON 4 MG: 2 INJECTION INTRAMUSCULAR; INTRAVENOUS at 15:01

## 2017-12-24 RX ADMIN — ONDANSETRON 4 MG: 2 INJECTION INTRAMUSCULAR; INTRAVENOUS at 09:14

## 2017-12-24 RX ADMIN — HEPARIN SODIUM 5000 UNITS: 5000 INJECTION, SOLUTION INTRAVENOUS; SUBCUTANEOUS at 05:18

## 2017-12-24 RX ADMIN — PANTOPRAZOLE SODIUM 40 MG: 40 TABLET, DELAYED RELEASE ORAL at 05:18

## 2017-12-24 RX ADMIN — HEPARIN SODIUM 5000 UNITS: 5000 INJECTION, SOLUTION INTRAVENOUS; SUBCUTANEOUS at 13:56

## 2017-12-24 RX ADMIN — MORPHINE SULFATE 2 MG: 2 INJECTION, SOLUTION INTRAMUSCULAR; INTRAVENOUS at 05:53

## 2017-12-24 RX ADMIN — MORPHINE SULFATE 2 MG: 2 INJECTION, SOLUTION INTRAMUSCULAR; INTRAVENOUS at 01:43

## 2017-12-24 RX ADMIN — ATORVASTATIN CALCIUM 20 MG: 20 TABLET, FILM COATED ORAL at 09:07

## 2017-12-24 RX ADMIN — GABAPENTIN 300 MG: 300 CAPSULE ORAL at 18:48

## 2017-12-24 RX ADMIN — GABAPENTIN 300 MG: 300 CAPSULE ORAL at 09:08

## 2017-12-24 RX ADMIN — ONDANSETRON 4 MG: 2 INJECTION INTRAMUSCULAR; INTRAVENOUS at 21:13

## 2017-12-24 RX ADMIN — KETOTIFEN FUMARATE 1 DROP: 0.35 SOLUTION/ DROPS OPHTHALMIC at 09:08

## 2017-12-24 RX ADMIN — QUETIAPINE FUMARATE 50 MG: 25 TABLET ORAL at 21:13

## 2017-12-24 RX ADMIN — MORPHINE SULFATE 2 MG: 2 INJECTION, SOLUTION INTRAMUSCULAR; INTRAVENOUS at 23:03

## 2017-12-24 RX ADMIN — LISINOPRIL 2.5 MG: 5 TABLET ORAL at 09:08

## 2017-12-24 RX ADMIN — DEXTROAMPHETAMINE SACCHARATE, AMPHETAMINE ASPARTATE, DEXTROAMPHETAMINE SULFATE AND AMPHETAMINE SULFATE 20 MG: 2.5; 2.5; 2.5; 2.5 TABLET ORAL at 09:08

## 2017-12-24 RX ADMIN — MORPHINE SULFATE 2 MG: 2 INJECTION, SOLUTION INTRAMUSCULAR; INTRAVENOUS at 10:02

## 2017-12-24 NOTE — PROGRESS NOTES
[facility] Internal Medicine Progress Note  Patient: Stephani Ozuna 55 y o  female   MRN: [id number]  PCP: Tosha Bowles PA-C  Unit/Bed#: E2 -94 Encounter: [id number]  Date Of Visit: 12/24/17    Assessment:    Principal Problem:    Acute pancreatitis  Active Problems:    IDDM (insulin dependent diabetes mellitus) ([facility] )    Dyslipidemia    Wegener's granulomatosis ([facility] )    CKD (chronic kidney disease) stage 3, GFR 30-59 ml/min    MPA (microscopic polyangiitis) ([facility])    HTN (hypertension)    Chronic pain    Noncompliance    Bipolar 1 disorder (HCC)    Chronic flank pain      Plan:    · Acute pancreatitis solely based on elevated lipase and abdominal pain CT imaging did not show pancreatic inflammation gallbladder ultrasound within normal limits had actually been scheduled as outpatient but patient noncompliant to appointment subsequent lipase level 425 t yesterday trending down continues to have nausea but no further emesis less abdominal pain will advance to full liquid diet still await HIDA scan  · Dyslipidemia continue atorvastatin will check lipid panel as possible etiology of pancreatitis  · Insulin-dependent diabetes mellitus stable presently had been on Lantus 22 units q h s  and mealtime insulin but only takes it p r n  when she is on prednisone for example    So not really a insulin-dependent diabetic and blood sugar stable here will just follow with fingerstick blood sugars  · Chronic pain in relation to microscopic polyangiitis(MPA) on oxycodone and gabapentin has chronic flank pain will check repeat ANCA she continues to have reproducible left-sided intercostal pain and subcostal pain on palpation as prior  · Acute in kidney injury in the setting of chronic kidney disease and Justa's granulomatosis(MPA) has been noncompliant to follow-up for immune suppressive therapy apparently some improvement in renal function with IV hydration for from entrance creatinine of 2 0  · History of bipolar disorder on Adderall  · Hypertension on metoprolol 25 mg daily and lisinopril 2 5      VTE Pharmacologic Prophylaxis:   Pharmacologic: Heparin  Mechanical VTE Prophylaxis in Place: Yes    Discussions with Specialists or Other Care Team Provider:  No    Time Spent for Care: 30 minutes  More than 50% of total time spent on counseling and coordination of care as described above  Subjective:   By her description the symptoms have been recurrent and are associated with eating emesis and mid epigastric and flank pains at times was also treated recently for a urinary tract infection in relation to left-sided flank pain  She did not keep an outpatient scheduled gallbladder ultrasound appointment in apparently by the record also has not kept appointments for follow-up for her treatment of MPA and immunesuppression therapy    Objective:     Vitals:   Temp (24hrs), Av 2 °F (36 2 °C), Min:96 7 °F (35 9 °C), Max:97 6 °F (36 4 °C)    HR:  [71-91] 91  Resp:  [18] 18  BP: (136-182)/() 165/100  SpO2:  [95 %-99 %] 97 %  Body mass index is 37 13 kg/m²  Input and Output Summary (last 24 hours):        Intake/Output Summary (Last 24 hours) at 17 7913  Last data filed at 17 9514   Gross per 24 hour   Intake           693 75 ml   Output              950 ml   Net          -256 25 ml       Physical Exam:     Physical Exam:   General appearance: alert, appears stated age and cooperative  Head: Normocephalic, without obvious abnormality, atraumatic  Lungs: clear to auscultation bilaterally  Heart: regular rate and rhythm  Abdomen: Rather diffuse abdominal pain but possibly more accentuated epigastrium  Back: negative, no kyphosis present  Extremities: extremities normal, atraumatic, no cyanosis or edema  Neurologic: Grossly normal      Additional Data:     Labs:      Results from last 7 days  Lab Units 17  0457 17  1730   WBC Thousand/uL 5 08 6 72   HEMOGLOBIN g/dL 11 8 12 6   HEMATOCRIT % 35 8 38 4   PLATELETS Thousands/uL 257 294   NEUTROS PCT %  --  65   LYMPHS PCT %  --  24   MONOS PCT %  --  10   EOS PCT %  --  1       Results from last 7 days  Lab Units 12/24/17  0602   SODIUM mmol/L 140   POTASSIUM mmol/L 3 9   CHLORIDE mmol/L 106   CO2 mmol/L 24   BUN mg/dL 15   CREATININE mg/dL 1 70*   CALCIUM mg/dL 9 3   TOTAL PROTEIN g/dL 7 3   BILIRUBIN TOTAL mg/dL 0 31   ALK PHOS U/L 109   ALT U/L 30   AST U/L 29   GLUCOSE RANDOM mg/dL 89           * I Have Reviewed All Lab Data Listed Above  * Additional Pertinent Lab Tests Reviewed: All Labs For Current Hospital Admission Reviewed    Imaging:  Xr Chest 2 Views    Result Date: 12/21/2017  Narrative: CHEST DUAL ENERGY INDICATION:  Cough  Bilateral flank pain  Former smoker  COMPARISON:  1/18/2017 VIEWS:  PA (including soft tissue and bone algorithms) and lateral projections; 4 images FINDINGS:     Cardiomediastinal silhouette appears unremarkable  The lungs are clear  No pneumothorax or pleural effusion  Visualized osseous structures appear within normal limits for the patient's age  Impression: No active pulmonary disease  Workstation performed: [id number]     Imaging Reports Reviewed Today Include:  Reviewed chest x-ray and CT scan abdomen pelvis  Imaging Personally Reviewed by Myself Includes:  No  Procedure: Xr Chest 2 Views    Result Date: 12/21/2017  Narrative: CHEST DUAL ENERGY INDICATION:  Cough  Bilateral flank pain  Former smoker  COMPARISON:  1/18/2017 VIEWS:  PA (including soft tissue and bone algorithms) and lateral projections; 4 images FINDINGS:     Cardiomediastinal silhouette appears unremarkable  The lungs are clear  No pneumothorax or pleural effusion  Visualized osseous structures appear within normal limits for the patient's age  Impression: No active pulmonary disease   Workstation performed: [id number]        Recent Cultures (last 7 days):           Last 24 Hours Medication List:     amphetamine-dextroamphetamine 20 mg Oral BID atorvastatin 20 mg Oral Daily   cycloSPORINE 1 drop Both Eyes BID   gabapentin 300 mg Oral TID   heparin (porcine) 5,000 Units Subcutaneous Q8H [address]   insulin lispro 1-5 Units Subcutaneous Q6H [address]   ketotifen 1 drop Both Eyes BID   lisinopril 2 5 mg Oral Daily   methylnaltrexone 6 mg Subcutaneous Every Other Day   pantoprazole 40 mg Oral Early Morning   QUEtiapine 50 mg Oral HS        Today, Patient Was Seen By: Dina Guevara MD    ** Please Note: Dragon 360 Dictation voice to text software may have been used in the creation of this document   **

## 2017-12-24 NOTE — PLAN OF CARE
Problem: Potential for Falls  Goal: Patient will remain free of falls  INTERVENTIONS:  - Assess patient frequently for physical needs  -  Identify cognitive and physical deficits and behaviors that affect risk of falls    -  [place] fall precautions as indicated by assessment   - Educate patient/family on patient safety including physical limitations  - Instruct patient to call for assistance with activity based on assessment  - Modify environment to reduce risk of injury  - Consider OT/PT consult to assist with strengthening/mobility   Outcome: Progressing      Problem: PAIN - ADULT  Goal: Verbalizes/displays adequate comfort level or baseline comfort level  Interventions:  - Encourage patient to monitor pain and request assistance  - Assess pain using appropriate pain scale  - Administer analgesics based on type and severity of pain and evaluate response  - Implement non-pharmacological measures as appropriate and evaluate response  - Consider cultural and social influences on pain and pain management  - Notify physician/advanced practitioner if interventions unsuccessful or patient reports new pain   Outcome: Progressing      Problem: MUSCULOSKELETAL - ADULT  Goal: Maintain or return mobility to safest level of function  INTERVENTIONS:  - Assess patient's ability to carry out ADLs; assess patient's baseline for ADL function and identify physical deficits which impact ability to perform ADLs (bathing, care of mouth/teeth, toileting, grooming, dressing, etc )  - Assess/evaluate cause of self-care deficits   - Assess range of motion  - Assess patient's mobility; develop plan if impaired  - Assess patient's need for assistive devices and provide as appropriate  - Encourage maximum independence but intervene and supervise when necessary  - Involve family in performance of ADLs  - Assess for home care needs following discharge   - Request OT consult to assist with ADL evaluation and planning for discharge  - Provide patient education as appropriate   Outcome: Progressing    Goal: Maintain proper alignment of affected body part  INTERVENTIONS:  - Support, maintain and protect limb and body alignment  - Provide pt/fam with appropriate education   Outcome: Progressing      Problem: DISCHARGE PLANNING - CARE MANAGEMENT  Goal: Discharge to post-acute care or home with appropriate resources  INTERVENTIONS:  - Conduct assessment to determine patient/family and health care team treatment goals, and need for post-acute services based on payer coverage, community resources, and patient preferences, and barriers to discharge  - Address psychosocial, clinical, and financial barriers to discharge as identified in assessment in conjunction with the patient/family and health care team  - Arrange appropriate level of post-acute services according to patient's   needs and preference and payer coverage in collaboration with the physician and health care team  - Communicate with and update the patient/family, physician, and health care team regarding progress on the discharge plan  - Arrange appropriate transportation to post-acute venues   Outcome: Progressing

## 2017-12-24 NOTE — PLAN OF CARE
MUSCULOSKELETAL - ADULT     Maintain or return mobility to safest level of function Progressing        Potential for Falls     Patient will remain free of falls Progressing          PAIN - ADULT     Verbalizes/displays adequate comfort level or baseline comfort level Not Progressing

## 2017-12-25 VITALS
WEIGHT: 203 LBS | RESPIRATION RATE: 20 BRPM | BODY MASS INDEX: 37.36 KG/M2 | OXYGEN SATURATION: 95 % | TEMPERATURE: 97.9 F | DIASTOLIC BLOOD PRESSURE: 81 MMHG | HEIGHT: 62 IN | HEART RATE: 74 BPM | SYSTOLIC BLOOD PRESSURE: 135 MMHG

## 2017-12-25 LAB
GLUCOSE SERPL-MCNC: 121 MG/DL (ref 65–140)
GLUCOSE SERPL-MCNC: 178 MG/DL (ref 65–140)
GLUCOSE SERPL-MCNC: 99 MG/DL (ref 65–140)

## 2017-12-25 PROCEDURE — 82948 REAGENT STRIP/BLOOD GLUCOSE: CPT

## 2017-12-25 RX ORDER — OXYCODONE HYDROCHLORIDE 5 MG/1
5 TABLET ORAL EVERY 4 HOURS PRN
Status: DISCONTINUED | OUTPATIENT
Start: 2017-12-25 | End: 2017-12-25 | Stop reason: HOSPADM

## 2017-12-25 RX ADMIN — GABAPENTIN 300 MG: 300 CAPSULE ORAL at 02:16

## 2017-12-25 RX ADMIN — DEXTROAMPHETAMINE SACCHARATE, AMPHETAMINE ASPARTATE, DEXTROAMPHETAMINE SULFATE AND AMPHETAMINE SULFATE 20 MG: 2.5; 2.5; 2.5; 2.5 TABLET ORAL at 09:44

## 2017-12-25 RX ADMIN — PANTOPRAZOLE SODIUM 40 MG: 40 TABLET, DELAYED RELEASE ORAL at 05:48

## 2017-12-25 RX ADMIN — ATORVASTATIN CALCIUM 20 MG: 20 TABLET, FILM COATED ORAL at 08:10

## 2017-12-25 RX ADMIN — CYCLOSPORINE 1 DROP: 0.5 EMULSION OPHTHALMIC at 08:11

## 2017-12-25 RX ADMIN — ONDANSETRON 4 MG: 2 INJECTION INTRAMUSCULAR; INTRAVENOUS at 12:33

## 2017-12-25 RX ADMIN — MORPHINE SULFATE 2 MG: 2 INJECTION, SOLUTION INTRAMUSCULAR; INTRAVENOUS at 08:05

## 2017-12-25 RX ADMIN — MORPHINE SULFATE 2 MG: 2 INJECTION, SOLUTION INTRAMUSCULAR; INTRAVENOUS at 03:56

## 2017-12-25 RX ADMIN — HEPARIN SODIUM 5000 UNITS: 5000 INJECTION, SOLUTION INTRAVENOUS; SUBCUTANEOUS at 05:48

## 2017-12-25 RX ADMIN — OXYCODONE HYDROCHLORIDE 5 MG: 5 TABLET ORAL at 12:33

## 2017-12-25 RX ADMIN — GABAPENTIN 300 MG: 300 CAPSULE ORAL at 08:10

## 2017-12-25 RX ADMIN — KETOTIFEN FUMARATE 1 DROP: 0.35 SOLUTION/ DROPS OPHTHALMIC at 08:11

## 2017-12-25 RX ADMIN — LISINOPRIL 2.5 MG: 5 TABLET ORAL at 08:10

## 2017-12-25 NOTE — PROGRESS NOTES
Progress Note - Stephani Ozuna 52 y o  female MRN: [id number]    Unit/Bed#: E2 -01 Encounter: [id number]    Assessment/Plan:    Acute pancreatitis  appears resolved will advance diet and monitor    Wegeners   continue follow-up with Nephrology       Dyslipidemia   continue statin for LDL control    Hypertension   well controlled with current medication    GERD    continue PPI for acid control    Diabetes   appears steroid related and diet controlled    Subjective:   Feels much better mild discomfort left side no abdominal pain denies chest pain shortness of breath nausea vomiting diarrhea no fevers chills very hungry    Objective:     Vitals: Blood pressure 135/81, pulse 74, temperature 97 9 °F (36 6 °C), temperature source Tympanic, resp  rate 20, height 5' 2" (1 575 m), weight 92 1 kg (203 lb), SpO2 95 %  ,Body mass index is 37 13 kg/m²          Results from last 7 days  Lab Units 12/22/17  0457   WBC Thousand/uL 5 08   HEMOGLOBIN g/dL 11 8   HEMATOCRIT % 35 8   PLATELETS Thousands/uL 257       Results from last 7 days  Lab Units 12/24/17  0602   SODIUM mmol/L 140   POTASSIUM mmol/L 3 9   CHLORIDE mmol/L 106   CO2 mmol/L 24   BUN mg/dL 15   CREATININE mg/dL 1 70*   CALCIUM mg/dL 9 3   TOTAL PROTEIN g/dL 7 3   BILIRUBIN TOTAL mg/dL 0 31   ALK PHOS U/L 109   ALT U/L 30   AST U/L 29   GLUCOSE RANDOM mg/dL 89       Scheduled Meds:    amphetamine-dextroamphetamine 20 mg Oral BID   atorvastatin 20 mg Oral Daily   cycloSPORINE 1 drop Both Eyes BID   gabapentin 300 mg Oral TID   heparin (porcine) 5,000 Units Subcutaneous Q8H [facility]   ketotifen 1 drop Both Eyes BID   lisinopril 2 5 mg Oral Daily   methylnaltrexone 6 mg Subcutaneous Every Other Day   pantoprazole 40 mg Oral Early Morning   QUEtiapine 50 mg Oral HS       Continuous Infusions:     Physical exam:  General appearance:  Alert no distress interaction appropriate   Head/Eyes:  Nonicteric PERRL EOMI  Neck:  Supple  Lungs: CTA bilateral no wheezing rhonchi or rales  Heart: normal S1 S2 regular  Abdomen:  Obese nontender with bowel sounds  Extremities: no edema  Skin: no rash    Invasive Devices     Peripheral Intravenous Line            Peripheral IV 12/24/17 Right Arm less than 1 day                  VTE Pharmacologic Prophylaxis:  heparin   VTE Mechanical Prophylaxis:  SCDs                     Counseling / Coordination of Care  Total floor / unit time spent today 30   minutes  Greater than 50% of total time was spent with the patient and / or family counseling and / or coordination of care    A description of the counseling / coordination of care:  Discussed with 2 family members at bedside

## 2017-12-25 NOTE — PLAN OF CARE
Problem: Potential for Falls  Goal: Patient will remain free of falls  INTERVENTIONS:  - Assess patient frequently for physical needs  -  Identify cognitive and physical deficits and behaviors that affect risk of falls    -  [place] fall precautions as indicated by assessment   - Educate patient/family on patient safety including physical limitations  - Instruct patient to call for assistance with activity based on assessment  - Modify environment to reduce risk of injury  - Consider OT/PT consult to assist with strengthening/mobility   Outcome: Progressing      Problem: PAIN - ADULT  Goal: Verbalizes/displays adequate comfort level or baseline comfort level  Interventions:  - Encourage patient to monitor pain and request assistance  - Assess pain using appropriate pain scale  - Administer analgesics based on type and severity of pain and evaluate response  - Implement non-pharmacological measures as appropriate and evaluate response  - Consider cultural and social influences on pain and pain management  - Notify physician/advanced practitioner if interventions unsuccessful or patient reports new pain   Outcome: Progressing      Problem: MUSCULOSKELETAL - ADULT  Goal: Maintain or return mobility to safest level of function  INTERVENTIONS:  - Assess patient's ability to carry out ADLs; assess patient's baseline for ADL function and identify physical deficits which impact ability to perform ADLs (bathing, care of mouth/teeth, toileting, grooming, dressing, etc )  - Assess/evaluate cause of self-care deficits   - Assess range of motion  - Assess patient's mobility; develop plan if impaired  - Assess patient's need for assistive devices and provide as appropriate  - Encourage maximum independence but intervene and supervise when necessary  - Involve family in performance of ADLs  - Assess for home care needs following discharge   - Request OT consult to assist with ADL evaluation and planning for discharge  - Provide patient education as appropriate   Outcome: Progressing    Goal: Maintain proper alignment of affected body part  INTERVENTIONS:  - Support, maintain and protect limb and body alignment  - Provide pt/fam with appropriate education   Outcome: Progressing      Problem: DISCHARGE PLANNING - CARE MANAGEMENT  Goal: Discharge to post-acute care or home with appropriate resources  INTERVENTIONS:  - Conduct assessment to determine patient/family and health care team treatment goals, and need for post-acute services based on payer coverage, community resources, and patient preferences, and barriers to discharge  - Address psychosocial, clinical, and financial barriers to discharge as identified in assessment in conjunction with the patient/family and health care team  - Arrange appropriate level of post-acute services according to patient's   needs and preference and payer coverage in collaboration with the physician and health care team  - Communicate with and update the patient/family, physician, and health care team regarding progress on the discharge plan  - Arrange appropriate transportation to post-acute venues    Outcome: Progressing

## 2017-12-25 NOTE — PLAN OF CARE
DISCHARGE PLANNING - CARE MANAGEMENT     Discharge to post-acute care or home with appropriate resources Progressing        MUSCULOSKELETAL - ADULT     Maintain or return mobility to safest level of function Progressing     Maintain proper alignment of affected body part Progressing        PAIN - ADULT     Verbalizes/displays adequate comfort level or baseline comfort level Progressing        Potential for Falls     Patient will remain free of falls Progressing

## 2017-12-25 NOTE — DISCHARGE INSTRUCTIONS
Low fat, low salt ADA (low carb) diet  Check blood sugars 4 times daily 0700 1100 1600 and 2100 and call family doctor if over 250 of under 76

## 2017-12-25 NOTE — DISCHARGE SUMMARY
Discharge Summary - Medical Lenoria Slider 52 y o  female MRN: [id number]    [facility] [address] [address] MED SURG Room / Bed: [place] [address] /E2 -* Encounter: [id number]    BRIEF OVERVIEW    Admitting Provider: Gorge Norris DO  Discharge Provider: Drea Barros MD  Admission Date: 12/21/2017     Discharge Date: No discharge date for patient encounter    Primary Care Physician at Discharge: Axel Jacob [place] [phone]    Primary Discharge Diagnosis  Acute pancreatitis  Wegener's granulomatous  Medical noncompliance    Other Problems Addressed  Patient Active Problem List    Diagnosis Date Noted    Bipolar 1 disorder ([facility] ) 12/21/2017    Chronic flank pain 12/21/2017    Chronic pain 07/25/2017    Noncompliance 07/25/2017    Acute pancreatitis 07/24/2016    CKD (chronic kidney disease) stage 3, GFR 30-59 ml/min     MPA (microscopic polyangiitis) (HCC)     Asthma     HTN (hypertension)     IDDM (insulin dependent diabetes mellitus) ([place] [address] ) 04/06/2016    Essential hypertension 04/06/2016    Dyslipidemia 04/06/2016    Wegener's granulomatosis ([place] [address] ) 04/06/2016         Discharge Disposition: home  Allergies  Allergies   Allergen Reactions    Bactrim [Sulfamethoxazole-Trimethoprim]      Pt "They think that is what cause the pancreatitis"     Haldol [Haloperidol] Other (See Comments)     "I don't like it"    Ibuprofen     Navane [Thiothixene]      SI    Other      "novaine?" antipsychotic    Prozac [Fluoxetine Hcl]      SI    Lexapro [Escitalopram Oxalate] Rash     Diet restrictions:  Low-salt low-fat American diabetic Association diet  Activity restrictions:  as tolerated   Discharge Condition:  failure     Outpatient [address] in 1 week  Follow up with consulting providers   nephrology Dr Christine Barros in 1 week      [address] [place] [address]    Presenting Problem/History of Present Illness  Acute pancreatitis  Hospital Course  52year-old presents with abdominal pain for 4 to 5 day association with vomiting and nausea  Acute pancreatitis  lipase elevated at 2325, admitted with NPO, IV fluids nerve IV narcotic pain control  Lipase decreased to 423 with reduction in pain she was initiated on liquid diet and then advanced to full diet with no residual pain or discomfort  Discharged to continue with a low-fat diet for at least 2 weeks    Wegeners granulomatous patient continues to follow with Nephrology    Diabetes   patient report only on insulin whenever on steroids therefore will maintain American diabetic Association diet check her sugars 4 times daily and report elevation to her family doctor  CKD 3    creatinine stable 1 7 now at discharge baseline was around 2, she will continue to follow up with Nephrology    Medical noncompliance  continue to stress compliant with medications and diet and follow up with Nephrology to maintain Health and Wellness with associated chronic medical issues      Discharge  Statement   I spent 40 minutes discharging the patient  This time was spent on the day of discharge  I had direct contact with the patient on the day of discharge  Additional documentation is required if more than 30 minutes were spent on discharge  Stressed medical and diet compliance and follow-up with her family doctor and renal doctor    Discharge instructions/Information to patient and family:   See after visit summary for information provided to patient and family

## 2017-12-25 NOTE — SOCIAL WORK
CM received a call from nursing that patient would be discharging today and not tomorrow  Assigned CM had sent referral last week to [facility], prior to discharge, but they will be unable to accept patient  Additional referrals sent to [facility] and [facility] and requested to inform CM if they would be accepting patient  Taxi voucher was requested for patient to be able to transport home as she is unable to secure ride  CM called and spoke to hospital supervisor and requested she provide taxi voucher to patient's nurse to be completed, and signed by  and put on  desk to be processed tomorrow  No shower chair script was written  Patient reported having a RW & BSC already, but shower chairs are not covered   CM suggested that patient purchased at [employer], which are available for approximately $25-$30

## 2017-12-28 ENCOUNTER — GENERIC CONVERSION - ENCOUNTER (OUTPATIENT)
Dept: OTHER | Facility: OTHER | Age: 47
End: 2017-12-28

## 2018-01-05 ENCOUNTER — LAB REQUISITION (OUTPATIENT)
Dept: LAB | Facility: HOSPITAL | Age: 48
End: 2018-01-05
Payer: COMMERCIAL

## 2018-01-05 ENCOUNTER — ALLSCRIPTS OFFICE VISIT (OUTPATIENT)
Dept: OTHER | Facility: OTHER | Age: 48
End: 2018-01-05

## 2018-01-05 DIAGNOSIS — K85.90 ACUTE PANCREATITIS WITHOUT INFECTION OR NECROSIS: ICD-10-CM

## 2018-01-05 LAB
ALBUMIN SERPL BCP-MCNC: 3.5 G/DL (ref 3.5–5)
ALP SERPL-CCNC: 127 U/L (ref 46–116)
ALT SERPL W P-5'-P-CCNC: 28 U/L (ref 12–78)
ANION GAP SERPL CALCULATED.3IONS-SCNC: 8 MMOL/L (ref 4–13)
AST SERPL W P-5'-P-CCNC: 16 U/L (ref 5–45)
BASOPHILS # BLD AUTO: 0.01 THOUSANDS/ΜL (ref 0–0.1)
BASOPHILS NFR BLD AUTO: 0 % (ref 0–1)
BILIRUB SERPL-MCNC: 0.2 MG/DL (ref 0.2–1)
BUN SERPL-MCNC: 40 MG/DL (ref 5–25)
CALCIUM SERPL-MCNC: 9.4 MG/DL (ref 8.3–10.1)
CHLORIDE SERPL-SCNC: 105 MMOL/L (ref 100–108)
CO2 SERPL-SCNC: 23 MMOL/L (ref 21–32)
CREAT SERPL-MCNC: 1.97 MG/DL (ref 0.6–1.3)
EOSINOPHIL # BLD AUTO: 0.04 THOUSAND/ΜL (ref 0–0.61)
EOSINOPHIL NFR BLD AUTO: 1 % (ref 0–6)
ERYTHROCYTE [DISTWIDTH] IN BLOOD BY AUTOMATED COUNT: 13.9 % (ref 11.6–15.1)
GFR SERPL CREATININE-BSD FRML MDRD: 30 ML/MIN/1.73SQ M
GLUCOSE SERPL-MCNC: 95 MG/DL (ref 65–140)
HCT VFR BLD AUTO: 38 % (ref 34.8–46.1)
HGB BLD-MCNC: 12.6 G/DL (ref 11.5–15.4)
LIPASE SERPL-CCNC: 340 U/L (ref 73–393)
LYMPHOCYTES # BLD AUTO: 1.67 THOUSANDS/ΜL (ref 0.6–4.47)
LYMPHOCYTES NFR BLD AUTO: 28 % (ref 14–44)
MCH RBC QN AUTO: 30.7 PG (ref 26.8–34.3)
MCHC RBC AUTO-ENTMCNC: 33.2 G/DL (ref 31.4–37.4)
MCV RBC AUTO: 93 FL (ref 82–98)
MONOCYTES # BLD AUTO: 0.49 THOUSAND/ΜL (ref 0.17–1.22)
MONOCYTES NFR BLD AUTO: 8 % (ref 4–12)
NEUTROPHILS # BLD AUTO: 3.65 THOUSANDS/ΜL (ref 1.85–7.62)
NEUTS SEG NFR BLD AUTO: 63 % (ref 43–75)
NRBC BLD AUTO-RTO: 0 /100 WBCS
PLATELET # BLD AUTO: 304 THOUSANDS/UL (ref 149–390)
PMV BLD AUTO: 10.6 FL (ref 8.9–12.7)
POTASSIUM SERPL-SCNC: 4.8 MMOL/L (ref 3.5–5.3)
PROT SERPL-MCNC: 7.9 G/DL (ref 6.4–8.2)
RBC # BLD AUTO: 4.1 MILLION/UL (ref 3.81–5.12)
SODIUM SERPL-SCNC: 136 MMOL/L (ref 136–145)
WBC # BLD AUTO: 5.88 THOUSAND/UL (ref 4.31–10.16)

## 2018-01-05 PROCEDURE — 85025 COMPLETE CBC W/AUTO DIFF WBC: CPT | Performed by: PHYSICIAN ASSISTANT

## 2018-01-05 PROCEDURE — 80053 COMPREHEN METABOLIC PANEL: CPT | Performed by: PHYSICIAN ASSISTANT

## 2018-01-05 PROCEDURE — 83690 ASSAY OF LIPASE: CPT | Performed by: PHYSICIAN ASSISTANT

## 2018-01-06 ENCOUNTER — HOSPITAL ENCOUNTER (INPATIENT)
Facility: HOSPITAL | Age: 48
LOS: 1 days | Discharge: HOME | DRG: 439 | End: 2018-01-07
Attending: INTERNAL MEDICINE | Admitting: INTERNAL MEDICINE
Payer: COMMERCIAL

## 2018-01-06 ENCOUNTER — APPOINTMENT (OUTPATIENT)
Dept: CT IMAGING | Facility: HOSPITAL | Age: 48
DRG: 439 | End: 2018-01-06
Payer: COMMERCIAL

## 2018-01-06 DIAGNOSIS — F31.9 BIPOLAR 1 DISORDER (HCC): ICD-10-CM

## 2018-01-06 DIAGNOSIS — R10.13 EPIGASTRIC PAIN: ICD-10-CM

## 2018-01-06 DIAGNOSIS — R11.0 NAUSEA: ICD-10-CM

## 2018-01-06 DIAGNOSIS — Z91.19 NONCOMPLIANCE: ICD-10-CM

## 2018-01-06 DIAGNOSIS — Z87.19 HISTORY OF PANCREATITIS: ICD-10-CM

## 2018-01-06 DIAGNOSIS — K29.70 GASTRITIS: Primary | ICD-10-CM

## 2018-01-06 DIAGNOSIS — K85.90 PANCREATITIS: ICD-10-CM

## 2018-01-06 LAB
ALBUMIN SERPL BCP-MCNC: 3.5 G/DL (ref 3.5–5)
ALP SERPL-CCNC: 125 U/L (ref 46–116)
ALT SERPL W P-5'-P-CCNC: 25 U/L (ref 12–78)
ANION GAP SERPL CALCULATED.3IONS-SCNC: 12 MMOL/L (ref 4–13)
AST SERPL W P-5'-P-CCNC: 18 U/L (ref 5–45)
BACTERIA UR QL AUTO: ABNORMAL /HPF
BASOPHILS # BLD AUTO: 0.01 THOUSANDS/ΜL (ref 0–0.1)
BASOPHILS NFR BLD AUTO: 0 % (ref 0–1)
BILIRUB SERPL-MCNC: 0.15 MG/DL (ref 0.2–1)
BILIRUB UR QL STRIP: NEGATIVE
BILIRUB UR QL STRIP: NEGATIVE
BUN SERPL-MCNC: 34 MG/DL (ref 5–25)
CALCIUM SERPL-MCNC: 9.4 MG/DL (ref 8.3–10.1)
CHLORIDE SERPL-SCNC: 104 MMOL/L (ref 100–108)
CLARITY UR: CLEAR
CLARITY UR: CLEAR
CO2 SERPL-SCNC: 24 MMOL/L (ref 21–32)
COLOR UR: YELLOW
COLOR UR: YELLOW
CREAT SERPL-MCNC: 1.93 MG/DL (ref 0.6–1.3)
EOSINOPHIL # BLD AUTO: 0.06 THOUSAND/ΜL (ref 0–0.61)
EOSINOPHIL NFR BLD AUTO: 1 % (ref 0–6)
ERYTHROCYTE [DISTWIDTH] IN BLOOD BY AUTOMATED COUNT: 13.9 % (ref 11.6–15.1)
GFR SERPL CREATININE-BSD FRML MDRD: 30 ML/MIN/1.73SQ M
GLUCOSE SERPL-MCNC: 102 MG/DL (ref 65–140)
GLUCOSE UR STRIP-MCNC: NEGATIVE MG/DL
GLUCOSE UR STRIP-MCNC: NEGATIVE MG/DL
HCT VFR BLD AUTO: 37.9 % (ref 34.8–46.1)
HGB BLD-MCNC: 12.8 G/DL (ref 11.5–15.4)
HGB UR QL STRIP.AUTO: ABNORMAL
HGB UR QL STRIP.AUTO: ABNORMAL
KETONES UR STRIP-MCNC: NEGATIVE MG/DL
KETONES UR STRIP-MCNC: NEGATIVE MG/DL
LEUKOCYTE ESTERASE UR QL STRIP: NEGATIVE
LEUKOCYTE ESTERASE UR QL STRIP: NEGATIVE
LIPASE SERPL-CCNC: 442 U/L (ref 73–393)
LYMPHOCYTES # BLD AUTO: 2.06 THOUSANDS/ΜL (ref 0.6–4.47)
LYMPHOCYTES NFR BLD AUTO: 29 % (ref 14–44)
MCH RBC QN AUTO: 31.5 PG (ref 26.8–34.3)
MCHC RBC AUTO-ENTMCNC: 33.8 G/DL (ref 31.4–37.4)
MCV RBC AUTO: 93 FL (ref 82–98)
MONOCYTES # BLD AUTO: 0.7 THOUSAND/ΜL (ref 0.17–1.22)
MONOCYTES NFR BLD AUTO: 10 % (ref 4–12)
NEUTROPHILS # BLD AUTO: 4.22 THOUSANDS/ΜL (ref 1.85–7.62)
NEUTS SEG NFR BLD AUTO: 60 % (ref 43–75)
NITRITE UR QL STRIP: NEGATIVE
NITRITE UR QL STRIP: NEGATIVE
NON-SQ EPI CELLS URNS QL MICRO: ABNORMAL /HPF
NRBC BLD AUTO-RTO: 0 /100 WBCS
PH UR STRIP.AUTO: 5.5 [PH] (ref 4.5–8)
PH UR STRIP.AUTO: 5.5 [PH] (ref 4.5–8)
PLATELET # BLD AUTO: 288 THOUSANDS/UL (ref 149–390)
PMV BLD AUTO: 10.3 FL (ref 8.9–12.7)
POTASSIUM SERPL-SCNC: 4.4 MMOL/L (ref 3.5–5.3)
PROT SERPL-MCNC: 8 G/DL (ref 6.4–8.2)
PROT UR STRIP-MCNC: >=300 MG/DL
PROT UR STRIP-MCNC: ABNORMAL MG/DL
RBC # BLD AUTO: 4.06 MILLION/UL (ref 3.81–5.12)
RBC #/AREA URNS AUTO: ABNORMAL /HPF
SODIUM SERPL-SCNC: 140 MMOL/L (ref 136–145)
SP GR UR STRIP.AUTO: >=1.03 (ref 1–1.03)
SP GR UR STRIP.AUTO: >=1.03 (ref 1–1.03)
UROBILINOGEN UR QL STRIP.AUTO: 0.2 E.U./DL
UROBILINOGEN UR QL STRIP.AUTO: 0.2 E.U./DL
WBC # BLD AUTO: 7.05 THOUSAND/UL (ref 4.31–10.16)
WBC #/AREA URNS AUTO: ABNORMAL /HPF

## 2018-01-06 PROCEDURE — 80053 COMPREHEN METABOLIC PANEL: CPT | Performed by: PHYSICIAN ASSISTANT

## 2018-01-06 PROCEDURE — 74176 CT ABD & PELVIS W/O CONTRAST: CPT

## 2018-01-06 PROCEDURE — 36415 COLL VENOUS BLD VENIPUNCTURE: CPT | Performed by: PHYSICIAN ASSISTANT

## 2018-01-06 PROCEDURE — 85025 COMPLETE CBC W/AUTO DIFF WBC: CPT | Performed by: PHYSICIAN ASSISTANT

## 2018-01-06 PROCEDURE — 81001 URINALYSIS AUTO W/SCOPE: CPT | Performed by: PHYSICIAN ASSISTANT

## 2018-01-06 PROCEDURE — 83690 ASSAY OF LIPASE: CPT | Performed by: PHYSICIAN ASSISTANT

## 2018-01-06 RX ORDER — MAGNESIUM HYDROXIDE/ALUMINUM HYDROXICE/SIMETHICONE 120; 1200; 1200 MG/30ML; MG/30ML; MG/30ML
30 SUSPENSION ORAL ONCE
Status: COMPLETED | OUTPATIENT
Start: 2018-01-06 | End: 2018-01-06

## 2018-01-06 RX ORDER — BISACODYL 10 MG
10 SUPPOSITORY, RECTAL RECTAL ONCE
Status: DISCONTINUED | OUTPATIENT
Start: 2018-01-06 | End: 2018-01-07 | Stop reason: HOSPADM

## 2018-01-06 RX ORDER — FAMOTIDINE 40 MG/5ML
20 POWDER, FOR SUSPENSION ORAL ONCE
Status: COMPLETED | OUTPATIENT
Start: 2018-01-06 | End: 2018-01-06

## 2018-01-06 RX ORDER — SODIUM CHLORIDE, SODIUM LACTATE, POTASSIUM CHLORIDE, CALCIUM CHLORIDE 600; 310; 30; 20 MG/100ML; MG/100ML; MG/100ML; MG/100ML
125 INJECTION, SOLUTION INTRAVENOUS CONTINUOUS
Status: DISCONTINUED | OUTPATIENT
Start: 2018-01-06 | End: 2018-01-07 | Stop reason: HOSPADM

## 2018-01-06 RX ORDER — ACETAMINOPHEN 325 MG/1
650 TABLET ORAL EVERY 6 HOURS PRN
Status: DISCONTINUED | OUTPATIENT
Start: 2018-01-06 | End: 2018-01-07 | Stop reason: HOSPADM

## 2018-01-06 RX ORDER — ONDANSETRON 2 MG/ML
4 INJECTION INTRAMUSCULAR; INTRAVENOUS EVERY 6 HOURS PRN
Status: DISCONTINUED | OUTPATIENT
Start: 2018-01-06 | End: 2018-01-07 | Stop reason: HOSPADM

## 2018-01-06 RX ORDER — HEPARIN SODIUM 5000 [USP'U]/ML
5000 INJECTION, SOLUTION INTRAVENOUS; SUBCUTANEOUS EVERY 12 HOURS SCHEDULED
Status: DISCONTINUED | OUTPATIENT
Start: 2018-01-06 | End: 2018-01-07 | Stop reason: HOSPADM

## 2018-01-06 RX ADMIN — SODIUM CHLORIDE, SODIUM LACTATE, POTASSIUM CHLORIDE, AND CALCIUM CHLORIDE 125 ML/HR: .6; .31; .03; .02 INJECTION, SOLUTION INTRAVENOUS at 23:11

## 2018-01-06 RX ADMIN — HYDROMORPHONE HYDROCHLORIDE 0.5 MG: 1 INJECTION, SOLUTION INTRAMUSCULAR; INTRAVENOUS; SUBCUTANEOUS at 21:03

## 2018-01-06 RX ADMIN — ALUMINUM HYDROXIDE, MAGNESIUM HYDROXIDE, AND SIMETHICONE 30 ML: 200; 200; 20 SUSPENSION ORAL at 20:07

## 2018-01-06 RX ADMIN — HEPARIN SODIUM 5000 UNITS: 5000 INJECTION, SOLUTION INTRAVENOUS; SUBCUTANEOUS at 21:03

## 2018-01-06 RX ADMIN — SODIUM CHLORIDE, SODIUM LACTATE, POTASSIUM CHLORIDE, AND CALCIUM CHLORIDE 1000 ML: .6; .31; .03; .02 INJECTION, SOLUTION INTRAVENOUS at 21:26

## 2018-01-06 RX ADMIN — ONDANSETRON 4 MG: 2 INJECTION INTRAMUSCULAR; INTRAVENOUS at 21:03

## 2018-01-06 RX ADMIN — FAMOTIDINE 20 MG: 40 POWDER, FOR SUSPENSION ORAL at 20:58

## 2018-01-06 RX ADMIN — LIDOCAINE HYDROCHLORIDE 15 ML: 20 SOLUTION ORAL; TOPICAL at 20:07

## 2018-01-07 VITALS
HEART RATE: 71 BPM | DIASTOLIC BLOOD PRESSURE: 73 MMHG | SYSTOLIC BLOOD PRESSURE: 138 MMHG | TEMPERATURE: 97.4 F | RESPIRATION RATE: 20 BRPM | WEIGHT: 198.85 LBS | BODY MASS INDEX: 36.37 KG/M2 | OXYGEN SATURATION: 99 %

## 2018-01-07 PROBLEM — K85.90 PANCREATITIS: Status: ACTIVE | Noted: 2018-01-07

## 2018-01-07 PROBLEM — R10.13 EPIGASTRIC PAIN: Status: ACTIVE | Noted: 2018-01-07

## 2018-01-07 PROBLEM — N83.209 OVARIAN CYST: Status: ACTIVE | Noted: 2018-01-07

## 2018-01-07 PROBLEM — B02.29 POSTHERPETIC NEURALGIA: Status: ACTIVE | Noted: 2018-01-07

## 2018-01-07 LAB
ALBUMIN SERPL BCP-MCNC: 2.9 G/DL (ref 3.5–5)
ALP SERPL-CCNC: 106 U/L (ref 46–116)
ALT SERPL W P-5'-P-CCNC: 21 U/L (ref 12–78)
ANION GAP SERPL CALCULATED.3IONS-SCNC: 11 MMOL/L (ref 4–13)
AST SERPL W P-5'-P-CCNC: 18 U/L (ref 5–45)
BILIRUB SERPL-MCNC: 0.13 MG/DL (ref 0.2–1)
BUN SERPL-MCNC: 29 MG/DL (ref 5–25)
CALCIUM SERPL-MCNC: 8.7 MG/DL (ref 8.3–10.1)
CHLORIDE SERPL-SCNC: 105 MMOL/L (ref 100–108)
CO2 SERPL-SCNC: 24 MMOL/L (ref 21–32)
CREAT SERPL-MCNC: 1.8 MG/DL (ref 0.6–1.3)
GFR SERPL CREATININE-BSD FRML MDRD: 33 ML/MIN/1.73SQ M
GLUCOSE SERPL-MCNC: 111 MG/DL (ref 65–140)
MAGNESIUM SERPL-MCNC: 2.1 MG/DL (ref 1.6–2.6)
PHOSPHATE SERPL-MCNC: 3.8 MG/DL (ref 2.7–4.5)
PLATELET # BLD AUTO: 265 THOUSANDS/UL (ref 149–390)
PMV BLD AUTO: 10.2 FL (ref 8.9–12.7)
POTASSIUM SERPL-SCNC: 4.1 MMOL/L (ref 3.5–5.3)
PROT SERPL-MCNC: 6.5 G/DL (ref 6.4–8.2)
SODIUM SERPL-SCNC: 140 MMOL/L (ref 136–145)

## 2018-01-07 PROCEDURE — 99285 EMERGENCY DEPT VISIT HI MDM: CPT

## 2018-01-07 PROCEDURE — 85049 AUTOMATED PLATELET COUNT: CPT | Performed by: INTERNAL MEDICINE

## 2018-01-07 PROCEDURE — 80053 COMPREHEN METABOLIC PANEL: CPT | Performed by: INTERNAL MEDICINE

## 2018-01-07 PROCEDURE — 83735 ASSAY OF MAGNESIUM: CPT | Performed by: INTERNAL MEDICINE

## 2018-01-07 PROCEDURE — 86255 FLUORESCENT ANTIBODY SCREEN: CPT | Performed by: INTERNAL MEDICINE

## 2018-01-07 PROCEDURE — 83520 IMMUNOASSAY QUANT NOS NONAB: CPT | Performed by: INTERNAL MEDICINE

## 2018-01-07 PROCEDURE — 84100 ASSAY OF PHOSPHORUS: CPT | Performed by: INTERNAL MEDICINE

## 2018-01-07 RX ORDER — MORPHINE SULFATE 2 MG/ML
2 INJECTION, SOLUTION INTRAMUSCULAR; INTRAVENOUS EVERY 4 HOURS PRN
Status: DISCONTINUED | OUTPATIENT
Start: 2018-01-07 | End: 2018-01-07 | Stop reason: HOSPADM

## 2018-01-07 RX ORDER — METOCLOPRAMIDE HYDROCHLORIDE 5 MG/ML
10 INJECTION INTRAMUSCULAR; INTRAVENOUS EVERY 6 HOURS PRN
Status: DISCONTINUED | OUTPATIENT
Start: 2018-01-07 | End: 2018-01-07 | Stop reason: HOSPADM

## 2018-01-07 RX ADMIN — HEPARIN SODIUM 5000 UNITS: 5000 INJECTION, SOLUTION INTRAVENOUS; SUBCUTANEOUS at 09:07

## 2018-01-07 RX ADMIN — METOCLOPRAMIDE 10 MG: 5 INJECTION, SOLUTION INTRAMUSCULAR; INTRAVENOUS at 09:08

## 2018-01-07 RX ADMIN — ONDANSETRON 4 MG: 2 INJECTION INTRAMUSCULAR; INTRAVENOUS at 12:51

## 2018-01-07 RX ADMIN — SODIUM CHLORIDE, SODIUM LACTATE, POTASSIUM CHLORIDE, AND CALCIUM CHLORIDE 125 ML/HR: .6; .31; .03; .02 INJECTION, SOLUTION INTRAVENOUS at 15:35

## 2018-01-07 RX ADMIN — HYDROMORPHONE HYDROCHLORIDE 0.5 MG: 1 INJECTION, SOLUTION INTRAMUSCULAR; INTRAVENOUS; SUBCUTANEOUS at 01:07

## 2018-01-07 RX ADMIN — METOCLOPRAMIDE 10 MG: 5 INJECTION, SOLUTION INTRAMUSCULAR; INTRAVENOUS at 15:35

## 2018-01-07 RX ADMIN — ONDANSETRON 4 MG: 2 INJECTION INTRAMUSCULAR; INTRAVENOUS at 05:27

## 2018-01-07 RX ADMIN — MORPHINE SULFATE 2 MG: 2 INJECTION, SOLUTION INTRAMUSCULAR; INTRAVENOUS at 12:06

## 2018-01-07 RX ADMIN — HYDROMORPHONE HYDROCHLORIDE 0.5 MG: 1 INJECTION, SOLUTION INTRAMUSCULAR; INTRAVENOUS; SUBCUTANEOUS at 06:08

## 2018-01-07 NOTE — CASE MANAGEMENT
Initial Clinical Review    Admission: Date/Time/Statement:[email or web address]    Orders Placed This Encounter   Procedures    Place in Observation (expected length of stay for this patient is less than two midnights)     Standing Status:   Standing     Number of Occurrences:   1     Order Specific Question:   Admitting Physician     Answer:   Jonas Duval [[id number]]     Order Specific Question:   Level of Care     Answer:   Med Surg [16]         ED: Date/Time/Mode of Arrival:   ED Arrival Information     Expected Arrival Acuity Means of Arrival Escorted By Service Admission Type    - 1/6/2018 19:01 Urgent Walk-In Self General Medicine Urgent    Arrival Complaint    Nausea,Abdominal Pain          Chief Complaint:   Chief Complaint   Patient presents with    Abdominal Pain     upper abd/epigastric pain +nausea/vomiting states hx pancreatitis  History of Illness:   52year old female with 8-9/10 sharp pain epigastric area and radiates to the back with nausea, abdominal pain for 1 day onset  Pain worse with eating as she has been noncompliant with diet  Patient vomited three days ago but just persistent nausea in the last 24 hours  Also complains of dysuria and left flank pain  Complains of chills, denies fever  Patient last ate something 3 hours ago which were a few bites of empanada       She took zofran twice earlier today  Only took percocet 7 5 mg/325 today x 1 dose    Although in pain she could not tolerate po meds        Has a history of diarrhea and constipation although did not have a bowel movement today       Lives by herself, uses a scooter and cane to get around       ED Vital Signs:   ED Triage Vitals   Temperature Pulse Respirations Blood Pressure SpO2   01/06/18 1909 01/06/18 1909 01/06/18 1909 01/06/18 1911 01/06/18 1909   98 7 °F (37 1 °C) 88 16 131/88 96 %      Temp Source Heart Rate Source Patient Position - Orthostatic VS BP Location FiO2 (%)   01/06/18 1909 01/06/18 2100 01/06/18 1911 01/06/18 1911 --   Temporal Monitor Sitting Right arm       Pain Score       01/06/18 1909       8        Wt Readings from Last 1 Encounters:   01/07/18 90 2 kg (198 lb 13 7 oz)       Vital Signs (abnormal): WNL    Abnormal Labs/Diagnostic Test Results:    01/05 01/06 0700 - 01/07 0659     Time:  1404 1935 2113 2119 0521        POC BLOOD GASES    eGFR  30 30   33  eGFR     CHEM PROFILE    Sodium  136 140   140  Sodium     Potassium  4 8 4 4   4 1  Potassium     Chloride  105 104   105  Chloride     CO2  23 24   24  CO2     Anion Gap  8 12   11  Anion Gap     BUN  40 34   29  BUN     Creatinine  1 97 1 93   1 80  Creatinine     Glucose  95 102   111  Glucose     Calcium  9 4 9 4   8 7  Calcium     AST  16 18   18  AST     ALT  28 25   21  ALT     Alkaline Phosphatase  127 125   106  Alkaline Phosphatase     Total Protein  7 9 8 0   6 5  Total Protein     Albumin  3 5 3 5   2 9  Albumin     Total Bilirubin  0 20 0 15   0 13  Total Bilirubin     eGFR  30 30   33  eGFR     Phosphorus      3 8  Phosphorus     Magnesium      2 1  Magnesium     Lipase  340 442     Lipase     CBC         URINALYSIS    Color, UA    Yellow Yellow   Color, UA     Clarity, UA    Clear Clear   Clarity, UA     Specific Gravity, UA    >=1 030 >=1 030   Specific Gravity, UA     Glucose, UA    Negative Negative   Glucose, UA     Ketones, UA    Negative Negative   Ketones, UA     Blood, UA    Small Small   Blood, UA     Nitrite, UA    Negative Negative   Nitrite, UA     Leukocytes, UA    Negative Negative   Leukocytes, UA     pH, UA    5 5 5 5   pH, UA     Protein, UA    >=300 100 (2+)   Protein, UA     Bilirubin, UA    Negative Negative   Bilirubin, UA     Urobilinogen, UA    0 2 0 2   Urobilinogen, UA     RBC, UA     4-10   RBC, UA     WBC, UA     2-4   WBC, UA     Bacteria, UA     None S  CT ABD:CT abnormality in the abdomen or pelvis to definitively account for the patient's symptoms    No significant interval change from previous examination      Hepatic steatosis  Left adnexal oblong cystic structure most consistent with paraovarian cyst or other loculated simple fluid collection, better evaluated on ultrasound July 24, 2017      Findings are consistent with the preliminary report from [facility] which was provided shortly after completion of the exam                  U/S PELVIS:PENDING      ED Treatment:   Medication Administration from 01/06/2018 1901 to 01/07/2018 0001       Date/Time Order Dose Route Action Action by Comments     01/06/2018 2007 lidocaine viscous (XYLOCAINE) 2 % mucosal solution 15 mL 15 mL Swish & Spit Given Katya Villegas [address] [place] [address]      01/06/2018 2058 famotidine (PEPCID) oral suspension 20 mg 20 mg Oral Given Katya Villegas RN      01/06/2018 2007 aluminum-magnesium hydroxide-simethicone (MYLANTA) 200-200-20 mg/5 mL oral suspension 30 mL 30 mL Oral Given Katya Villegas RN      01/06/2018 2103 ondansetron (ZOFRAN) injection 4 mg 4 mg Intravenous Given Katya Villegas RN      01/06/2018 2310 lactated ringers bolus 1,000 mL 0 mL Intravenous Stopped Yaya Nazario RN      01/06/2018 2126 lactated ringers bolus 1,000 mL 1,000 mL Intravenous New Bag Katya Villegas [address] [place] [address]      01/06/2018 2311 lactated ringers infusion 125 mL/hr Intravenous New Bag Yaya Nazario RN      01/06/2018 2103 heparin (porcine) subcutaneous injection 5,000 Units 5,000 Units Subcutaneous Given Katya Villegas RN      01/06/2018 2103 HYDROmorphone (DILAUDID) 1 mg/mL injection 0 5 mg 0 5 mg Intravenous Given Katya Villegas RN      01/06/2018 2304 HYDROmorphone (DILAUDID) 1 mg/mL injection 0 2 mg 0 2 mg Intravenous Not Given Yaya Nazario RN           Past Medical/Surgical History:    Active Ambulatory Problems     Diagnosis Date Noted    IDDM (insulin dependent diabetes mellitus) ([facility] ) 04/06/2016    Essential hypertension 04/06/2016    Dyslipidemia 04/06/2016    Wegener's granulomatosis ([facility] ) 04/06/2016    CKD (chronic kidney disease) stage 3, GFR 30-59 ml/min     MPA (microscopic polyangiitis) (HCC)     Asthma     HTN (hypertension)     Acute pancreatitis 07/24/2016    Chronic pain 07/25/2017    Noncompliance 07/25/2017    Bipolar 1 disorder ([facility] ) 12/21/2017    Chronic flank pain 12/21/2017     Resolved Ambulatory Problems     Diagnosis Date Noted    Acute kidney injury ([facility] ) 04/06/2016    Chest pain 04/06/2016    Dehydration 04/06/2016    Acute kidney injury superimposed on chronic kidney disease ([facility] ) 07/25/2017     Past Medical History:   Diagnosis Date    Anemia of chronic disease     Anxiety     Asthma     Asthma     Chronic abdominal pain     CKD (chronic kidney disease) stage 3, GFR 30-59 ml/min     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[place] [address] )     [facility] )     Diabetes mellitus ([place] [address] )     DVT (deep venous thrombosis) ([place] [address] )     History of acute pancreatitis     HTN (hypertension)     Hyperlipidemia     Hypertension     Microscopic polyangiitis (HCC)     MPA (microscopic polyangiitis) (HCC)     Renal disorder     Self-inflicted injury     Wegener's granulomatosis with renal involvement ([place] [address] ) 2015       Admitting Diagnosis: Gastritis [K29 70]  Nausea [R11 0]  Pancreatitis [K85 90]  Epigastric pain [R10 13]  Abdominal pain [R10 9]  History of pancreatitis [Z87 19]  Noncompliance [Z91 19]  Bipolar 1 disorder ([facility] ) [F31 9]    Age/Sex: 52 y o  female    Assessment/Plan:   Assessment:  52year old female with history of schizoaffective disorder, ADHD, pancreatitis, CKD, post herpetic neuralgia microscopic polyangiitis presenting with abdominal pain with elevated lipase from 340 to 442 although in the past it has been in the 1000's        Plan:  Place under observation    -Abdominal pain: differential may be due to pancreatitis from noncompliant diet has Hepatic steatosis found on Ultrasound on 12/22/17  Due to dysuria and flank pain, consider also renal stone  Check abdominal ct with po constrast and ct renal stone protocol  No iv contrast due to CKD  Will give iv fluids for hydration due to delayed cap refill and dry mucous membranes  reheck lipase in am    NPO except pills  Zofran x 2 doses scheduled in addition to prn  Addendum: CT scan shows possible paraovarian cyst which was also seen on ultrasound in 7/17  Will order pelvic ultrasound  There was also stool in colon on earlier reading  Patient was given dulcolax supp          -CKD: last creat 1 87 on 5/18  1 97 on 1/5 and 1 93 on 1/6  Close to baseline, will follow  Appears dehydrated will give iv fluids       -Dry eyes: continue restasis  Post herpetic neuralgia: cotnnue neurontin 300mg tid  Hyperlipidemia: continue statin  Microscopic polyangiitis: continue to follow unclear if this could also give her pancreatitis pain  Will check ANCA and MPO level in the morning      -Psych history: Schizoaffective disorder: continue seroquel 50mg at bedtime  ADHD/Cataplexy: continue adderall 20mg bid  Admission Orders:  OBS [email or web address]  OOB  NPO  CONSULT GASTROENTEROLOGY  INPT [email or web address]  Scheduled Meds:   bisacodyl 10 mg Rectal Once   heparin (porcine) 5,000 Units Subcutaneous Q12H [address]     Continuous Infusions:   lactated ringers 125 mL/hr Last Rate: 125 mL/hr (01/06/18 2311)     PRN Meds:   acetaminophen    Metoclopramide X1    morphine injection    Ondansetron X2  IV DILAUDID PRN X 3      Thank you,  [address] [place] [address] in the [facility] by Gian Matias for 2017  Network Utilization Review Department  Phone: [phone]; Fax [phone]  ATTENTION: The Network Utilization Review Department is now centralized for our 7 Facilities  Make a note that we have a new phone and fax numbers for our Department  Please call with any questions or concerns to [phone] and carefully follow the prompts so that you are directed to the right person   All voicemails are confidential  Fax any determinations, approvals, denials, and requests for initial or continue stay review clinical to [phone]  Due to HIGH CALL volume, it would be easier if you could please send faxed requests to expedite your requests and in part, help us provide discharge notifications faster

## 2018-01-07 NOTE — PLAN OF CARE
DISCHARGE PLANNING     Discharge to home or other facility with appropriate resources Adequate for Discharge        GASTROINTESTINAL - ADULT     Minimal or absence of nausea and/or vomiting Adequate for Discharge     Maintains or returns to baseline bowel function Adequate for Discharge     Maintains adequate nutritional intake Adequate for Discharge        INFECTION - ADULT     Absence or prevention of progression during hospitalization Adequate for Discharge        Knowledge Deficit     Patient/family/caregiver demonstrates understanding of disease process, treatment plan, medications, and discharge instructions Adequate for Discharge        PAIN - ADULT     Verbalizes/displays adequate comfort level or baseline comfort level Adequate for Discharge        Potential for Falls     Patient will remain free of falls Adequate for Discharge        SAFETY ADULT     Maintain or return to baseline ADL function Adequate for Discharge     Maintain or return mobility status to optimal level Adequate for Discharge

## 2018-01-07 NOTE — DISCHARGE SUMMARY
[address] INTERNAL MEDICINE   [facility] 52 y o  female   MRN: [id number]   Encounter: [id number]   Unit/Bed: [place] [address] 2 [facility] [id number]     Admitting Provider:  Jenn Toth MD  Discharge Provider:  Nestor Avalos DO  Admission Date: 1/6/2018       Discharge Date: 01/07/18   LOS: 0  Primary Care Physician at Discharge: Jamilah Woodard PA-C [phone]    DISCHARGE DIAGNOSES  Principal Problem:    Epigastric pain  Active Problems:    CKD (chronic kidney disease) stage 3, GFR 30-59 ml/min    MPA (microscopic polyangiitis) (HCC)    HTN (hypertension)    Bipolar 1 disorder (HCC)    Pancreatitis    Ovarian cyst    Postherpetic neuralgia  Resolved Problems:    * No resolved hospital problems  *    HOSPITAL COURSE:  Robert Ventura is a 52 y o  female who presented to the hospital worsening abdominal pain  The patient has MPA as likely cause of chronic pancreatitis who re-presented to the hospital with abdominal pain  Patient was recently hospitalized here 12/21/2017 into 12/25/2017  She was treated conservatively with IV fluids  Her diet was advanced to clear liquids  This evening the patient is requesting discharge as she feels well  She did not have any discomfort after clear liquids  She wishes to take conservative measures and slowly advance diet as an outpatient as opposed to stay for further monitoring  She states that she has Percocet to take as needed for breakthrough pain if necessary  1  Acute on chronic pancreatitis  Thought secondary to microscopic polyangiitis  Patient had bowel rest and has tolerated clear liquid diet  As opposed to further up titration of diet, the patient wishes to be discharged and slowly increase diet as outpatient  2  Chronic kidney disease stage 3  Remains stable at baseline  Should follow up with Nephrology post discharge  3  Post herpetic neuralgia  Continue gabapentin  4  Bipolar depression    Mood stable outpatient meds  5  Left ovarian cyst  Consider pelvic ultrasound post discharge    Case discussed with patient's friends at bedside  Steph Holman-gastroenterology    PROCEDURES PERFORMED  Ct Abdomen Pelvis Wo Contrast  Result Date: 1/7/2018  Impression: CT abnormality in the abdomen or pelvis to definitively account for the patient's symptoms  No significant interval change from previous examination  Hepatic steatosis  Left adnexal oblong cystic structure most consistent with paraovarian cyst or other loculated simple fluid collection, better evaluated on ultrasound July 24, 2017   Findings are consistent with the preliminary report from [facility] which was provided shortly after completion of the exam              Workstation performed: [id number]     Other Pertinent Test Results    Results from last 7 days  Lab Units 01/07/18  0521 01/06/18  1935 01/05/18  1404   WBC Thousand/uL  --  7 05 5 88   HEMOGLOBIN g/dL  --  12 8 12 6   HEMATOCRIT %  --  37 9 38 0   MCV fL  --  93 93   PLATELETS Thousands/uL 265 288 304       Results from last 7 days  Lab Units 01/07/18  0521 01/06/18 1935 01/05/18  1404   SODIUM mmol/L 140 140 136   POTASSIUM mmol/L 4 1 4 4 4 8   CHLORIDE mmol/L 105 104 105   CO2 mmol/L 24 24 23   ANION GAP mmol/L 11 12 8   BUN mg/dL 29* 34* 40*   CREATININE mg/dL 1 80* 1 93* 1 97*   CALCIUM mg/dL 8 7 9 4 9 4   ALBUMIN g/dL 2 9* 3 5 3 5   BILIRUBIN TOTAL mg/dL 0 13* 0 15* 0 20   ALK PHOS U/L 106 125* 127*   ALT U/L 21 25 28   AST U/L 18 18 16   EGFR ml/min/1 73sq m 33 30 30   GLUCOSE RANDOM mg/dL 111 102 95       Cultures:     Results from last 7 days  Lab Units 01/06/18  2119   COLOR UA  Yellow   CLARITY UA  Clear   SPEC GRAV UA  >=1 030   PH UA  5 5   LEUKOCYTES UA  Negative   NITRITE UA  Negative   PROTEIN UA mg/dl 100 (2+)*   GLUCOSE UA mg/dl Negative   KETONES UA mg/dl Negative   BILIRUBIN UA  Negative   BLOOD UA  Small*        Results from last 7 days  Lab Units 01/06/18 2119   RBC UA /hpf 4-10*   WBC UA /hpf 2-4*   EPITHELIAL CELLS WET PREP /hpf Occasional   BACTERIA UA /hpf None Seen            PHYSICAL EXAM:  Vitals:   Blood Pressure: 138/73 (01/07/18 0738)  Pulse: 71 (01/07/18 0738)  Temperature: (!) 97 4 °F (36 3 °C) (01/07/18 0738)  Temp Source: Tympanic (01/07/18 3773)  Respirations: 20 (01/07/18 0738)  Weight - Scale: 90 2 kg (198 lb 13 7 oz) (01/07/18 0600)  SpO2: 99 % (01/07/18 0738)    General appearance: alert, appears stated age and cooperative  Head: Normocephalic, without obvious abnormality, atraumatic  Lungs: clear to auscultation bilaterally  Heart: regular rate and rhythm  Abdomen: soft, non-tender; bowel sounds normal; no masses,  no organomegaly  Back: range of motion normal  Extremities: extremities normal, warm and well-perfused; no cyanosis, clubbing, or edema  Neurologic: Grossly normal    Planned Re-admission: No  Discharge Disposition: Home    Test Results Pending at Discharge: None  Incidental findings:  Left ovarian cyst   Consider pelvic ultrasound    Medications   Please see After Visit Summary for reconciled discharge medications provided to patient and family  Diet restrictions:   Low-fat diet  Activity restrictions: No strenuous activity  Discharge Condition: fair    Outpatient Follow-Up  1  [facility]  [address] [place] [address],[address] / Franca Buckley* [phone] - follow-up within one week  2  [facility]  Follow-up for recurrent pancreatitis  Code Status: Level 1 - Full Code  Discharge Statement   I spent 35 minutes discharging the patient  This time was spent on the day of discharge  Greater than 50% of total time was spent with the patient and / or family counseling and / or coordination of care

## 2018-01-07 NOTE — H&P
H&P Exam - Gogo Riser 52 y o  female MRN: [id number]    Unit/Bed#: ED 21 Encounter: [id number]    Assessment:  52year old female with history of schizoaffective disorder, ADHD, pancreatitis, CKD, post herpetic neuralgia microscopic polyangiitis presenting with abdominal pain with elevated lipase from 340 to 442 although in the past it has been in the 1000's  Plan:  Place under observation    -Abdominal pain: differential may be due to pancreatitis from noncompliant diet has Hepatic steatosis found on Ultrasound on 12/22/17  Due to dysuria and flank pain, consider also renal stone  Check abdominal ct with po constrast and ct renal stone protocol  No iv contrast due to CKD  Will give iv fluids for hydration due to delayed cap refill and dry mucous membranes  reheck lipase in am    NPO except pills  Zofran x 2 doses scheduled in addition to prn  Addendum: CT scan shows possible paraovarian cyst which was also seen on ultrasound in 7/17  Will order pelvic ultrasound  There was also stool in colon on earlier reading  Patient was given dulcolax supp  -CKD: last creat 1 87 on 5/18  1 97 on 1/5 and 1 93 on 1/6  Close to baseline, will follow  Appears dehydrated will give iv fluids      -Dry eyes: continue restasis  Post herpetic neuralgia: cotnnue neurontin 300mg tid  Hyperlipidemia: continue statin  Microscopic polyangiitis: continue to follow unclear if this could also give her pancreatitis pain  Will check ANCA and MPO level in the morning     -Psych history: Schizoaffective disorder: continue seroquel 50mg at bedtime  ADHD/Cataplexy: continue adderall 20mg bid    History of Present Illness   52year old female with 8-9/10 sharp pain epigastric area and radiates to the back with nausea, abdominal pain for 1 day onset  Pain worse with eating as she has been noncompliant with diet  Patient vomited three days ago but just persistent nausea in the last 24 hours    Also complains of dysuria and left flank pain  Complains of chills, denies fever  Patient last ate something 3 hours ago which were a few bites of empanada  She took zofran twice earlier today  Only took percocet 7 5 mg/325 today x 1 dose  Although in pain she could not tolerate po meds  Has a history of diarrhea and constipation although did not have a bowel movement today  Lives by herself, uses a scooter and cane to get around  Nephrologist: Dr Rajani Wright  Primary: Dr Raine Tran Doctor and Rheumatologist cannot recall  Review of Systems  History of chronic diarrhea constipation, nausea vomiting, last BM yesterday, uses scooter to get around some mild ambulatory dysfunction  , menopause, all other 10 point systems negative except for stated above HPI    Historical Information   Past Medical History:   Diagnosis Date    Anemia of chronic disease     Anxiety     Asthma     Asthma     Chronic abdominal pain     CKD (chronic kidney disease) stage 3, GFR 30-59 ml/min     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 )     Cushing syndrome ([facility]     Diabetes mellitus ([facility] )     DVT (deep venous thrombosis) ([email or web address]     History of acute pancreatitis     felt secondary to Bactrim    HTN (hypertension)     Hyperlipidemia     Hypertension     Microscopic polyangiitis (HCC)     [facility]) [facility]     Renal disorder     Self-inflicted injury     self inflicted skin wounds    Wegener's granulomatosis with renal involvement ([email or web address] 2015     Past Surgical History:   Procedure Laterality Date    ESOPHAGOGASTRODUODENOSCOPY  09/11/2015    mild antral gastritis     Social History   History   Alcohol Use No     History   Drug Use    Types: Marijuana     History   Smoking Status    Former Smoker    Types: Cigarettes    Quit date: 2010   Smokeless Tobacco    Never Used     Family History:   Family History   Problem Relation Age of Onset    Colon cancer Neg Hx        Meds/Allergies   PTA meds:   Prior to Admission Medications Prescriptions Last Dose Informant Patient Reported? Taking?    QUEtiapine (SEROquel) 50 mg tablet   Yes No   Sig: Take 50 mg by mouth daily at bedtime   amphetamine-dextroamphetamine (ADDERALL) 20 mg tablet   Yes No   Sig: Take 20 mg by mouth 2 (two) times a day     atorvastatin (LIPITOR) 20 mg tablet   Yes No   Sig: Take 20 mg by mouth daily   cycloSPORINE (RESTASIS) 0 05 % ophthalmic emulsion   Yes No   Sig: Administer 1 drop to both eyes 2 (two) times a day   gabapentin (NEURONTIN) 100 mg capsule   Yes No   Sig: Take 300 mg by mouth 3 (three) times a day     metoprolol succinate (TOPROL-XL) 25 mg 24 hr tablet   Yes No   Sig: Take 25 mg by mouth daily   ondansetron (ZOFRAN) 4 mg tablet   Yes No   Sig: Take 4 mg by mouth every 8 (eight) hours as needed for nausea or vomiting   oxyCODONE-acetaminophen (PERCOCET) 7 5-325 MG per tablet   Yes No   Sig: Take 1 tablet by mouth every 6 (six) hours as needed for moderate pain   polyvinyl alcohol-povidone (REFRESH) 1 4-0 6 % ophthalmic solution   Yes No   Si-2 drops as needed for wound care      Facility-Administered Medications: None     Allergies   Allergen Reactions    Bactrim [Sulfamethoxazole-Trimethoprim]      Pt "They think that is what cause the pancreatitis"     Haldol [Haloperidol] Other (See Comments)     "I don't like it"    Ibuprofen     Navane [Thiothixene]      SI    Other      "novaine?" antipsychotic    Prozac [Fluoxetine Hcl]      SI    Lexapro [Escitalopram Oxalate] Rash       Objective   First Vitals:   Blood Pressure: 131/88 (18)  Pulse: 88 (18)  Temperature: 98 7 °F (37 1 °C) (18)  Temp Source: Temporal (18)  Respirations: 16 (18)  SpO2: 96 % (18)    Current Vitals:   Blood Pressure: 131/88 (18)  Pulse: 88 (18)  Temperature: 98 7 °F (37 1 °C) (18)  Temp Source: Temporal (18)  Respirations: 16 (18 1909)  SpO2: 96 % (18 1909)    No intake or output data in the 24 hours ending 01/06/18 2033    Invasive Devices     Peripheral Intravenous Line            Peripheral IV 01/06/18 Right Antecubital less than 1 day                Physical Exam  General:  Obese female lying in bed comfortable  HEENT anicteric sclera  Dry mucous membranes  Cardiovascular:  S1-S2 regular rate rhythm  Capillary refill greater than 2 seconds  Abdomen:  Epigastric tenderness left flank pain CVA tenderness  Skin:  Prior scars from self-mutilation   Psych:  Appropriately answering questions at this time  Current withdrawal process is organized  Neuro:  No asterixis  Awake alert oriented x3      Lab Results:  Reviewed  Imaging:  Reviewed      Code Status: Prior Full code  Advance Directive and Living Will:      Power of :   Emergency contanct: Carlos Sanchez  POLST:      Counseling / Coordination of Care: Total floor / unit time spent today 30 minutes  and Greater than 50% of total time was spent with the patient and / or family counseling and / or coordination of care  A description of the counseling / coordination of care: diet modification

## 2018-01-07 NOTE — PLAN OF CARE
GASTROINTESTINAL - ADULT     Minimal or absence of nausea and/or vomiting Not Progressing     Maintains adequate nutritional intake Not Progressing          GASTROINTESTINAL - ADULT     Maintains or returns to baseline bowel function Progressing        INFECTION - ADULT     Absence or prevention of progression during hospitalization Progressing        Knowledge Deficit     Patient/family/caregiver demonstrates understanding of disease process, treatment plan, medications, and discharge instructions Progressing        PAIN - ADULT     Verbalizes/displays adequate comfort level or baseline comfort level Progressing        Potential for Falls     Patient will remain free of falls Progressing        SAFETY ADULT     Maintain or return to baseline ADL function Progressing     Maintain or return mobility status to optimal level Progressing

## 2018-01-07 NOTE — ED PROVIDER NOTES
History  Chief Complaint   Patient presents with    Abdominal Pain     upper abd/epigastric pain +nausea/vomiting states hx pancreatitis  Patient states friend has her Protonix liquid medication and has not been a has been unable to take medicines due to it being at her friend's house  History provided by:  Patient   used: No    Abdominal Pain   Pain location:  Epigastric  Pain quality: stabbing    Pain radiates to:  Epigastric region  Pain severity:  Moderate  Onset quality:  Gradual  Timing:  Constant  Progression:  Worsening  Chronicity:  Recurrent  Context: eating    Context: not alcohol use and not sick contacts    Relieved by:  Nothing  Worsened by:  Nothing  Ineffective treatments:  None tried  Associated symptoms: nausea    Associated symptoms: no chest pain, no chills, no cough, no fatigue, no vaginal bleeding and no vaginal discharge    Associated symptoms comment:  Nausea top complaint and epigastric pain 2nd  Risk factors: recent hospitalization    Risk factors: no alcohol abuse and not pregnant        Prior to Admission Medications   Prescriptions Last Dose Informant Patient Reported? Taking?    QUEtiapine (SEROquel) 50 mg tablet   Yes No   Sig: Take 50 mg by mouth daily at bedtime   amphetamine-dextroamphetamine (ADDERALL) 20 mg tablet   Yes No   Sig: Take 20 mg by mouth 2 (two) times a day     atorvastatin (LIPITOR) 20 mg tablet   Yes No   Sig: Take 20 mg by mouth daily   cycloSPORINE (RESTASIS) 0 05 % ophthalmic emulsion   Yes No   Sig: Administer 1 drop to both eyes 2 (two) times a day   gabapentin (NEURONTIN) 100 mg capsule   Yes No   Sig: Take 300 mg by mouth 3 (three) times a day     metoprolol succinate (TOPROL-XL) 25 mg 24 hr tablet   Yes No   Sig: Take 25 mg by mouth daily   ondansetron (ZOFRAN) 4 mg tablet   Yes No   Sig: Take 4 mg by mouth every 8 (eight) hours as needed for nausea or vomiting   oxyCODONE-acetaminophen (PERCOCET) 7 5-325 MG per tablet   Yes No   Sig: Take 1 tablet by mouth every 6 (six) hours as needed for moderate pain   polyvinyl alcohol-povidone (REFRESH) 1 4-0 6 % ophthalmic solution   Yes No   Si-2 drops as needed for wound care      Facility-Administered Medications: None       Past Medical History:   Diagnosis Date    Anemia of chronic disease     Anxiety     Asthma     Asthma     Chronic abdominal pain     CKD (chronic kidney disease) stage 3, GFR 30-59 ml/min     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[place] [address] )     Cushing syndrome ([place] [address] )     Diabetes mellitus ([place] [address] )     DVT (deep venous thrombosis) ([place] [address] )     History of acute pancreatitis     felt secondary to Bactrim    HTN (hypertension)     Hyperlipidemia     Hypertension     Microscopic polyangiitis (HCC)     [facility]     Renal disorder     Self-inflicted injury     self inflicted skin wounds    Wegener's granulomatosis with renal involvement ([place] [address] )        Past Surgical History:   Procedure Laterality Date    ESOPHAGOGASTRODUODENOSCOPY  2015    mild antral gastritis       Family History   Problem Relation Age of Onset    Colon cancer Neg Hx      I have reviewed and agree with the history as documented  Social History   Substance Use Topics    Smoking status: Former Smoker     Types: Cigarettes     Quit date:     Smokeless tobacco: Never Used    Alcohol use No        Review of Systems   Constitutional: Negative for chills and fatigue  HENT: Negative for dental problem  Respiratory: Negative for cough  Cardiovascular: Negative for chest pain  Gastrointestinal: Positive for abdominal pain and nausea  Epigastric pain   Endocrine: Negative for polydipsia  Genitourinary: Negative for vaginal bleeding, vaginal discharge and vaginal pain  Musculoskeletal: Negative for arthralgias  Skin: Negative for color change  Psychiatric/Behavioral: Positive for behavioral problems         Physical Exam  ED Triage Vitals   Temperature Pulse Respirations Blood Pressure SpO2   01/06/18 1909 01/06/18 1909 01/06/18 1909 01/06/18 1911 01/06/18 1909   98 7 °F (37 1 °C) 88 16 131/88 96 %      Temp Source Heart Rate Source Patient Position - Orthostatic VS BP Location FiO2 (%)   01/06/18 1909 01/06/18 2100 01/06/18 1911 01/06/18 1911 --   Temporal Monitor Sitting Right arm       Pain Score       01/06/18 1909       8           Orthostatic Vital Signs  Vitals:    01/06/18 1911 01/06/18 2100 01/06/18 2230 01/07/18 0034   BP: 131/88 123/89 131/73 146/93   Pulse:  79 74 74   Patient Position - Orthostatic VS: Sitting Lying Lying Lying       Physical Exam   Constitutional: She appears well-developed and well-nourished  Significant emotional distress  HENT:   Head: Normocephalic and atraumatic  Eyes: Conjunctivae are normal    Neck: Neck supple  No JVD present  Cardiovascular: Normal rate and regular rhythm  Pulmonary/Chest: Effort normal and breath sounds normal    Abdominal: Soft  There is tenderness  Tenderness epigastric region  Genitourinary:   Genitourinary Comments: No complaints deferred  Musculoskeletal: Normal range of motion  She exhibits no edema or deformity  Lymphadenopathy:     She has no cervical adenopathy  Neurological: She is alert  Skin: Skin is warm  Capillary refill takes less than 2 seconds  Psychiatric: She has a normal mood and affect         ED Medications  Medications   ondansetron (ZOFRAN) injection 4 mg (4 mg Intravenous Given 1/6/18 2103)   lactated ringers infusion (125 mL/hr Intravenous New Bag 1/6/18 2311)   heparin (porcine) subcutaneous injection 5,000 Units (5,000 Units Subcutaneous Given 1/6/18 2103)   acetaminophen (TYLENOL) tablet 650 mg (not administered)   HYDROmorphone (DILAUDID) 1 mg/mL injection 0 5 mg (0 5 mg Intravenous Given 1/7/18 0107)   HYDROmorphone (DILAUDID) 1 mg/mL injection 0 2 mg (0 2 mg Intravenous Not Given 1/6/18 2304)   bisacodyl (DULCOLAX) rectal suppository 10 mg (10 mg Rectal Not Given 1/7/18 0036)   lidocaine viscous (XYLOCAINE) 2 % mucosal solution 15 mL (15 mL Swish & Spit Given 1/6/18 2007)   famotidine (PEPCID) oral suspension 20 mg (20 mg Oral Given 1/6/18 2058)   aluminum-magnesium hydroxide-simethicone (MYLANTA) 200-200-20 mg/5 mL oral suspension 30 mL (30 mL Oral Given 1/6/18 2007)   lactated ringers bolus 1,000 mL (0 mL Intravenous Stopped 1/6/18 2310)       Diagnostic Studies  Results Reviewed     Procedure Component Value Units Date/Time    Urine Microscopic [[id number]]  (Abnormal) Collected:  01/06/18 2119    Lab Status:  Final result Specimen:  Urine from Urine, Clean Catch Updated:  01/06/18 2135     RBC, UA 4-10 (A) /hpf      WBC, UA 2-4 (A) /hpf      Epithelial Cells Occasional /hpf      Bacteria, UA None Seen /hpf     UA w Reflex to Microscopic w Reflex to Culture [[id number]]  (Abnormal) Collected:  01/06/18 2119    Lab Status:  Final result Specimen:  Urine from Urine, Clean Catch Updated:  01/06/18 2128     Color, UA Yellow     Clarity, UA Clear     Specific Gravity, UA >=1 030     pH, UA 5 5     Leukocytes, UA Negative     Nitrite, UA Negative     Protein,  (2+) (A) mg/dl      Glucose, UA Negative mg/dl      Ketones, UA Negative mg/dl      Urobilinogen, UA 0 2 E U /dl      Bilirubin, UA Negative     Blood, UA Small (A)    ED Urine Macroscopic [[id number]]  (Abnormal) Collected:  01/06/18 2113    Lab Status:  Final result Specimen:  Urine Updated:  01/06/18 2116     Color, UA Yellow     Clarity, UA Clear     pH, UA 5 5     Leukocytes, UA Negative     Nitrite, UA Negative     Protein, UA >=300 (A) mg/dl      Glucose, UA Negative mg/dl      Ketones, UA Negative mg/dl      Urobilinogen, UA 0 2 E U /dl      Bilirubin, UA Negative     Blood, UA Small (A)     Specific Gravity, UA >=1 030    Narrative:       CLINITEK RESULT    Platelet count [[id number]]     Lab Status:  No result Specimen:  Blood     CMP [[id number]]  (Abnormal) Collected:  01/06/18 1935    Lab Status:  Final result Specimen:  Blood from Arm, Right Updated:  01/06/18 2008     Sodium 140 mmol/L      Potassium 4 4 mmol/L      Chloride 104 mmol/L      CO2 24 mmol/L      Anion Gap 12 mmol/L      BUN 34 (H) mg/dL      Creatinine 1 93 (H) mg/dL      Glucose 102 mg/dL      Calcium 9 4 mg/dL      AST 18 U/L      ALT 25 U/L      Alkaline Phosphatase 125 (H) U/L      Total Protein 8 0 g/dL      Albumin 3 5 g/dL      Total Bilirubin 0 15 (L) mg/dL      eGFR 30 ml/min/1 73sq m     Narrative:         National Kidney Disease Education Program recommendations are as follows:  GFR calculation is accurate only with a steady state creatinine  Chronic Kidney disease less than 60 ml/min/1 73 sq  meters  Kidney failure less than 15 ml/min/1 73 sq  meters      Lipase [[id number]]  (Abnormal) Collected:  01/06/18 1935    Lab Status:  Final result Specimen:  Blood from Arm, Right Updated:  01/06/18 2008     Lipase 442 (H) u/L     CBC and differential [[id number]]  (Normal) Collected:  01/06/18 1935    Lab Status:  Final result Specimen:  Blood from Arm, Right Updated:  01/06/18 1948     WBC 7 05 Thousand/uL      RBC 4 06 Million/uL      Hemoglobin 12 8 g/dL      Hematocrit 37 9 %      MCV 93 fL      MCH 31 5 pg      MCHC 33 8 g/dL      RDW 13 9 %      MPV 10 3 fL      Platelets 146 Thousands/uL      nRBC 0 /100 WBCs      Neutrophils Relative 60 %      Lymphocytes Relative 29 %      Monocytes Relative 10 %      Eosinophils Relative 1 %      Basophils Relative 0 %      Neutrophils Absolute 4 22 Thousands/µL      Lymphocytes Absolute 2 06 Thousands/µL      Monocytes Absolute 0 70 Thousand/µL      Eosinophils Absolute 0 06 Thousand/µL      Basophils Absolute 0 01 Thousands/µL                  CT abdomen pelvis wo contrast    (Results Pending)              Procedures  Procedures       Phone Contacts  ED Phone Contact    ED Course  ED Course                                MDM  Number of Diagnoses or Management Options  Bipolar 1 disorder ([facility] [address] ): Epigastric pain:   Gastritis:   History of pancreatitis:   Nausea:   Noncompliance:   Pancreatitis:   Diagnosis management comments: 55-year-old female well known to the department for multiple evaluations and visits with most recent inpatient management for are pancreatitis  Patient presents this day with worsening epigastric pain  Patient states she does not have medicine at home  Have spoken with General Medicine and it is felt the patient would benefit from inpatient observation due to her medical noncompliance as well as the elevation of her lipase  Patient agreeable to admission  The patient remained stable in the department and upon transfer to the standard nursing floor  Amount and/or Complexity of Data Reviewed  Clinical lab tests: ordered and reviewed  Tests in the radiology section of CPT®: ordered and reviewed      CritCare Time    Disposition  Final diagnoses:   Gastritis   Epigastric pain   History of pancreatitis   Bipolar 1 disorder ([facility] )   Noncompliance   Nausea   Pancreatitis     Time reflects when diagnosis was documented in both MDM as applicable and the Disposition within this note     Time User Action Codes Description Comment    1/6/2018  7:52 PM Kathy Benton Add [K29 70] Gastritis     1/6/2018  7:53 PM Marla Bill [R10 13] Epigastric pain     1/6/2018  7:53 PM Kathy Benton Add [Z87 19] History of pancreatitis     1/6/2018  7:53 PM Kathy Benton Add [F31 9] Bipolar 1 disorder ([facility] [address] )     1/6/2018  7:53 PM Kathy Benton Add [Z91 19] Noncompliance     1/6/2018  7:53 PM Timothy Iglesias Add [R11 0] Nausea     1/6/2018  8:27 PM Timothy Romero Add [K85 90] Pancreatitis       ED Disposition     ED Disposition Condition Comment    Admit  Case was discussed with HAJA and the patient's admission status was agreed to be Admission Status: observation status to the service of Dr Hiro Marie           Follow-up Information    None       Current Discharge Medication List      CONTINUE these medications which have NOT CHANGED    Details   amphetamine-dextroamphetamine (ADDERALL) 20 mg tablet Take 20 mg by mouth 2 (two) times a day        atorvastatin (LIPITOR) 20 mg tablet Take 20 mg by mouth daily      cycloSPORINE (RESTASIS) 0 05 % ophthalmic emulsion Administer 1 drop to both eyes 2 (two) times a day      gabapentin (NEURONTIN) 100 mg capsule Take 300 mg by mouth 3 (three) times a day        metoprolol succinate (TOPROL-XL) 25 mg 24 hr tablet Take 25 mg by mouth daily      ondansetron (ZOFRAN) 4 mg tablet Take 4 mg by mouth every 8 (eight) hours as needed for nausea or vomiting      oxyCODONE-acetaminophen (PERCOCET) 7 5-325 MG per tablet Take 1 tablet by mouth every 6 (six) hours as needed for moderate pain      polyvinyl alcohol-povidone (REFRESH) 1 4-0 6 % ophthalmic solution 1-2 drops as needed for wound care      QUEtiapine (SEROquel) 50 mg tablet Take 50 mg by mouth daily at bedtime           No discharge procedures on file      ED Provider  Electronically Signed by           Emerson Dyer PA-C  01/07/18 3595

## 2018-01-07 NOTE — INCIDENTAL FINDINGS
The following findings require follow up:  Radiographic finding   Finding:  Left ovarian cyst   Follow up required:  Ultrasound   Follow up should be done within 2-3 month(s)    Please notify the following clinician to assist with the follow up:   Vinay Floyd PA-C

## 2018-01-08 NOTE — CASE MANAGEMENT
Notification of Discharge  This is a Notification of Discharge from our facility [address]  Please be advised that this patient has been discharge from our facility  Below you will find the admission and discharge date and time including the patients disposition  PRESENTATION DATE: 1/6/2018  7:12 PM  IP ADMISSION DATE: 1/7/18 1351  DISCHARGE DATE: 1/7/2018  5:33 PM  DISPOSITION: [facility] in the [facility] by Gian Matias for 2017  Network Utilization Review Department  Phone: [phone]; Fax [phone]  ATTENTION: The Network Utilization Review Department is now centralized for our 7 Facilities  Make a note that we have a new phone and fax numbers for our Department  Please call with any questions or concerns to [phone] and carefully follow the prompts so that you are directed to the right person  All voicemails are confidential  Fax any determinations, approvals, denials, and requests for initial or continue stay review clinical to [phone]  Due to HIGH CALL volume, it would be easier if you could please send faxed requests to expedite your requests and in part, help us provide discharge notifications faster

## 2018-01-09 ENCOUNTER — GENERIC CONVERSION - ENCOUNTER (OUTPATIENT)
Dept: OTHER | Facility: OTHER | Age: 48
End: 2018-01-09

## 2018-01-10 LAB — MYELOPEROXIDASE AB SER IA-ACNC: <9 U/ML (ref 0–9)

## 2018-01-10 NOTE — RESULT NOTES
Verified Results  (1) DRUG ABUSE SCREEN, URINE ROUTINE 20Sep2017 07:07PM Denis Most     Test Name Result Flag Reference   AMPHETAMINE SCREEN URINE Negative ng/mL  Zhlued=1523   Amphetamine test includes Amphetamine and Methamphetamine  BARBITURATE SCREEN URINE Negative ng/mL  Asyihn=843   BENZODIAZEPINE SCREEN, URINE Negative ng/mL  Tykssy=836   CANNABINOID SCREEN URINE Positive A Cutoff=50   Carboxy THC GC/MS Conf      75                 ng/mL Cutoff=15        01   COCAINE(METAB  )SCREEN, URINE Negative ng/mL  Gnigrd=168   METHADONE SCREEN, URINE Negative ng/mL  Wwinrh=812   OPIATE SCREEN URINE Negative  Rnvrda=178   Opiate test includes Codeine and Morphine only     PHENCYCLIDINE (PCP), QUAL, UR Negative ng/mL  Cutoff=25   PROPOXYPHENE, SCREEN Negative ng/mL  Cogfqf=713   Performed at:  [facility] [address]  [id number]  : Anson Martinez MD, Phone:  [phone]

## 2018-01-10 NOTE — RESULT NOTES
Verified Results  (1) CBC/PLT/DIFF [id number] 10:37AM Yunior Hunter Order Number: [id number]   Order Number: [id number]     Test Name Result Flag Reference   WBC COUNT 4 10 Thousand/uL L 4 31-10 16   RBC COUNT 3 93 Million/uL  3 81-5 12   HEMOGLOBIN 11 7 g/dL  11 5-15 4   HEMATOCRIT 35 1 %  34 8-46  1   MCV 89 fL  82-98   MCH 29 8 pg  26 8-34 3   MCHC 33 3 g/dL  31 4-37 4   RDW 13 2 %  11 6-15 1   MPV 10 8 fL  8 9-12 7   PLATELET COUNT 583 Thousands/uL  149-390   nRBC AUTOMATED 0 /100 WBCs     NEUTROPHILS RELATIVE PERCENT 72 %  43-75   LYMPHOCYTES RELATIVE PERCENT 15 %  14-44   MONOCYTES RELATIVE PERCENT 13 % H 4-12   EOSINOPHILS RELATIVE PERCENT 0 %  0-6   BASOPHILS RELATIVE PERCENT 0 %  0-1   NEUTROPHILS ABSOLUTE COUNT 2 90 Thousands/?L  1 85-7 62   LYMPHOCYTES ABSOLUTE COUNT 0 62 Thousands/?L  0 60-4 47   MONOCYTES ABSOLUTE COUNT 0 55 Thousand/?L  0 17-1 22   EOSINOPHILS ABSOLUTE COUNT 0 01 Thousand/?L  0 00-0 61   BASOPHILS ABSOLUTE COUNT 0 01 Thousands/?L  0 00-0 10     (1) COMPREHENSIVE METABOLIC PANEL [id number] 76:73GS Yunior Hunter Order Number: [id number]   Order Number: [id number] Order Number: [id number] Order Number: [id number]     Test Name Result Flag Reference   GLUCOSE,RANDM 148 mg/dL H    If the patient is fasting, the ADA then defines impaired fasting glucose as > 100 mg/dL and diabetes as > or equal to 123 mg/dL     SODIUM 142 mmol/L  136-145   POTASSIUM 3 9 mmol/L  3 5-5 3   CHLORIDE 107 mmol/L  100-108   CARBON DIOXIDE 25 mmol/L  21-32   ANION GAP (CALC) 10 mmol/L  4-13   BLOOD UREA NITROGEN 23 mg/dL  5-25   CREATININE 1 83 mg/dL H 0 60-1 30   Standardized to IDMS reference method   CALCIUM 9 5 mg/dL  8 3-10 1   BILI, TOTAL 0 18 mg/dL L 0 20-1 00   ALK PHOSPHATAS 81 U/L     ALT (SGPT) 28 U/L  12-78   AST(SGOT) 24 U/L  5-45   ALBUMIN 3 1 g/dL L 3 5-5 0   TOTAL PROTEIN 6 7 g/dL  6 4-8 2   eGFR Non- American 29 9 ml/min/1 73sq m     [facility] Disease Education Program recommendations are as follows:  GFR calculation is accurate only with a steady state creatinine  Chronic Kidney disease less than 60 ml/min/1 73 sq  meters  Kidney failure less than 15 ml/min/1 73 sq  meters  (1) LIPID PANEL, FASTING [id number] 10:37AM [facility] Order Number: [id number]  TW Order Number: [id number] Order Number: [id number] Order Number: [id number]     Test Name Result Flag Reference   CHOLESTEROL 297 mg/dL H    HDL,DIRECT 38 mg/dL L 40-60   Specimen collection should occur prior to Metamizole administration due to the potential for falsely depressed results  LDL CHOLESTEROL CALCULATED (Report)  0-100   Calculated LDL invalid, triglycerides >400 mg/dl    Triglyceride:       Normal       <150 mg/dl     Borderline High  150-199 mg/dl     High        200-499 mg/dl     Very High     >499 mg/dl  Cholesterol:       Desirable    <200 mg/dl    Borderline High 200-239 mg/dl    High       >239 mg/dl  HDL Cholesterol:      High  >59 mg/dL    Low   <41 mg/dL   Calculated LDL invalid, triglycerides >400 mg/dl    Triglyceride:         Normal              <150 mg/dl       Borderline High    150-199 mg/dl       High               200-499 mg/dl       Very High          >499 mg/dl  Cholesterol:         Desirable        <200 mg/dl      Borderline High  200-239 mg/dl      High             >239 mg/dl  HDL Cholesterol:        High    >59 mg/dL      Low     <41 mg/dL   TRIGLYCERIDES 590 mg/dL H <=150   Specimen collection should occur prior to N-Acetylcysteine or Metamizole administration due to the potential for falsely depressed results       (1) TSH WITH FT4 REFLEX [id number] 10:37AM [facility] Order Number: [id number]  TW Order Number: [id number]     Test Name Result Flag Reference   TSH 0 010 uIU/mL L 0 358-3 740   The recommended reference ranges for TSH during pregnancy are as follows:  First trimester 0 1 to 2 5 uIU/mL  Second trimester  0 2 to 3 0 uIU/mL  Third trimester 0 3 to 3 0 uIU/m   T4,FREE 1 35 ng/dL  0 76-1 46     (1) URINALYSIS w URINE C/S REFLEX (will reflex a microscopy if leukocytes, occult blood, or nitrites are not within normal limits) [id number] 10:37AM Sewell Geovanny Order Number: [id number]  TW Order Number: [id number]     Test Name Result Flag Reference   COLOR Yellow     CLARITY Cloudy     PH UA 6 0  4 5-8 0   LEUKOCYTE ESTERASE UA Negative  Negative   NITRITE UA Negative  Negative   PROTEIN UA >=1000 (4+) mg/dl A Negative   GLUCOSE UA Negative mg/dl  Negative   KETONES UA Negative mg/dl  Negative   UROBILINOGEN UA 0 2 E U /dl  0 2, 1 0 E U /dl   BILIRUBIN UA Negative  Negative   BLOOD UA Trace A Negative   SPECIFIC GRAVITY UA 1 024  1 003-1 030   BACTERIA Occasional /hpf  None Seen, Occasional   EPITHELIAL CELLS Moderate /hpf A None Seen, Occasional   HYALINE CASTS 5-10 /lpf A None Seen   RBC UA 2-4 /hpf A None Seen   WBC UA 10-20 /hpf A None Seen     (1) HEMOGLOBIN A1C [id number] 10:37AM Donato Small Order Number: [id number]  TW Order Number: [id number]     Test Name Result Flag Reference   HEMOGLOBIN A1C 6 7 % H 4 2-6 3   EST  AVG   GLUCOSE 146 mg/dl       (1) MICROALBUMIN CREATININE RATIO, RANDOM URINE [id number] 10:37AM Donato Small Order Number: [id number]  TW Order Number: [id number]     Test Name Result Flag Reference   MICROALBUMIN/ CREAT R 1586 mg/g creatinine H 0-30   MICROALBUMIN,URINE 3760 0 mg/L H 0 0-20 0   CREATININE URINE 237 0 mg/dL

## 2018-01-10 NOTE — RESULT NOTES
Verified Results  (1) URINALYSIS w URINE C/S REFLEX (will reflex a microscopy if leukocytes, occult blood, or nitrites are not within normal limits) [id number] 05:27PM Joellen Bryant     Test Name Result Flag Reference   CLINICAL REPORT (Report)     Test:        Urine culture  Specimen Type:   Urine  Specimen Date:   4/5/2017 5:27 PM  Result Date:    4/6/2017 6:20 PM  Result Status:   Final result  Resulting Lab:   [place] [address]            Tel: [phone]      CULTURE                                       ------------------                                   <10,000 cfu/ml Mixed Contaminants X2

## 2018-01-11 NOTE — MISCELLANEOUS
Reason For Visit  Reason For Visit Free Text Note Form: SW spoke with Shira Pacheco (psychologist/therapist) from [facility] [phone] in regards to the PT  It has been agreed by her PCP that her current mental health medication dosages will stay the same and that she will attend her psychiatric consultation at the end of September  Family practice will fax current medication list to [facility]  SW spoke with PCP about renewal of the PT's Adderol and Percocet  Active Problems    1  Abdominal pain (789 00) (R10 9)   2  Acid reflux (530 81) (K21 9)   3  Acute gastritis (535 00) (K29 00)   4  Acute kidney injury (584 9) (N17 9)   5  Anemia of chronic disease (285 29) (D63 8)   6  Arthralgia of multiple joints (719 49) (M25 50)   7  Attention deficit hyperactivity disorder (314 01) (F90 9)   8  Benign hypertension with chronic kidney disease, stage III (403 10,585 3) (I12 9,N18 3)   9  Bipolar I disorder, most recent episode depressed, severe without psychotic features   (296 53) (F31 4)   10  Blood in stool (578 1) (K92 1)   11  Borderline personality disorder (301 83) (F60 3)   12  Breast cancer screening (V76 10) (Z12 39)   13  Cataplexy (347 01) (G47 411)   14  Cervical cancer screening (V76 2) (Z12 4)   15  Chronic kidney disease, stage 3 (585 3) (N18 3)   16  Chronic low back pain (724 2,338 29) (M54 5,G89 29)   17  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)   18  Cough (786 2) (R05)   19  Current use of insulin (V58 67) (Z79 4)   20  Diabetes mellitus type II, controlled (250 00) (E11 9)   21  Diabetic eye exam (V72 0,250 00) (E11 9,Z01 00)   22  Diabetic neuropathy (250 60,357 2) (E11 40)   23  Dizziness (780 4) (R42)   24  Dysphagia (787 20) (R13 10)   25  Dyspnea (786 09) (R06 00)   26  Encounter for diabetic foot exam (250 00) (E11 9)   27  Exposure to pneumonia (V01 79) (Z20 828)   28  Hematuria (599 70) (R31 9)   29  Hyperlipidemia (272 4) (E78 5)   30  Lung nodule (793 11) (R91 1)   31   Memory difficulties (780 93) (R41 3)   32  Nausea (787 02) (R11 0)   33  Need for influenza vaccination (V04 81) (Z23)   34  Neuropathic pain (729 2) (M79 2)   35  Ovarian cyst (620 2) (N83 209)   36  Pancreatitis (577 0) (K85 90)   37  Peripheral neuropathy (356 9) (G62 9)   38  Plantar wart, left foot (078 12) (B07 0)   39  Post traumatic stress disorder (309 81) (F43 10)   40  Preop examination (V72 84) (Z01 818)   41  Proteinuria (791 0) (R80 9)   42  Saddle anesthesia (782 0) (R20 0)   43  Sciatica, right side (724 3) (M54 31)   44  Sleep disturbance (780 50) (G47 9)   45  Therapeutic opioid induced constipation (564 09,E935 2) (K59 03,T40 2X5A)   46  Tooth pain (525 9) (K08 89)   47  Urinary incontinence (788 30) (R32)   48  Weakness of both lower extremities (729 89) (R29 898)   49  Weakness of right side of body (728 87) (R53 1)   50  Wegener's granulomatosis (446 4) (M31 30)   51  Wheezing (786 07) (R06 2)    Current Meds   1  Amphetamine-Dextroamphet ER 20 MG Oral Capsule Extended Release 24 Hour; Take   1 capsule twice daily; Therapy: 42PBO6375 to (Evaluate:45Sfq6409); Last Rx:89Ddk5361 Ordered   2  FreeStyle Lite Test In Vitro Strip; TEST 3 TIMES DAILY; Therapy: 44Vme7633 to (Evaluate:49Tyr7441)  Requested for: 88Qjm0746; Last   Rx:49Vmk8832 Ordered   3  Gabapentin 300 MG Oral Capsule; 1 capsule TID Recorded   4  Lisinopril 2 5 MG Oral Tablet; TAKE 1 TABLET DAILY; Therapy: 41Hkm2448 to (Evaluate:43Tzl1831)  Requested for: 56Eso3232; Last   Rx:18Lcg5651 Ordered   5  Methocarbamol 500 MG Oral Tablet; TAKE 1 TABLET 3 TIMES DAILY; Therapy: 22IEL2431 to (Evaluate:36Kvh5526)  Requested for: 10Mny5369; Last   Rx:66Hjv9310 Ordered   6  Oxycodone-Acetaminophen 7 5-325 MG Oral Tablet; TAKE 1 TABLET EVERY 6 HOURS   AS NEEDED FOR PAIN;   Therapy: 27ROZ4097 to (Evaluate:11Oct2017); Last Rx:36Vtn2043 Ordered    Allergies    1  Bactrim TABS   2  Lexapro TABS   3  Navane CAPS   4   PROzac TABS    Signatures Electronically signed by : Modesta Adamson [facility]; Sep 13 2017  1:39PM EST                       (Author)

## 2018-01-11 NOTE — MISCELLANEOUS
Active Problems    1  Abdominal pain (789 00) (R10 9)   2  Acid reflux (530 81) (K21 9)   3  Acute gastritis (535 00) (K29 00)   4  Acute kidney injury (584 9) (N17 9)   5  Anemia of chronic disease (285 29) (D63 8)   6  Arthralgia of multiple joints (719 49) (M25 50)   7  Attention deficit hyperactivity disorder (314 01) (F90 9)   8  Benign hypertension with chronic kidney disease, stage III (403 10,585 3) (I12 9,N18 3)   9  Bipolar I disorder, most recent episode depressed, severe without psychotic features   (296 53) (F31 4)   10  Blood in stool (578 1) (K92 1)   11  Borderline personality disorder (301 83) (F60 3)   12  Breast cancer screening (V76 10) (Z12 39)   13  Cataplexy (347 01) (G47 411)   14  Cervical cancer screening (V76 2) (Z12 4)   15  Chronic kidney disease, stage 3 (585 3) (N18 3)   16  Chronic low back pain (724 2,338 29) (M54 5,G89 29)   17  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)   18  Cough (786 2) (R05)   19  Current use of insulin (V58 67) (Z79 4)   20  Diabetes mellitus type II, controlled (250 00) (E11 9)   21  Diabetic eye exam (V72 0,250 00) (E11 9,Z01 00)   22  Diabetic neuropathy (250 60,357 2) (E11 40)   23  Dizziness (780 4) (R42)   24  Dysphagia (787 20) (R13 10)   25  Dyspnea (786 09) (R06 00)   26  Encounter for diabetic foot exam (250 00) (E11 9)   27  Exposure to pneumonia (V01 79) (Z20 828)   28  Hematuria (599 70) (R31 9)   29  Hyperlipidemia (272 4) (E78 5)   30  Lung nodule (793 11) (R91 1)   31  Memory difficulties (780 93) (R41 3)   32  Nausea (787 02) (R11 0)   33  Need for influenza vaccination (V04 81) (Z23)   34  Neuropathic pain (729 2) (M79 2)   35  Ovarian cyst (620 2) (N83 209)   36  Pancreatitis (577 0) (K85 90)   37  Peripheral neuropathy (356 9) (G62 9)   38  Plantar wart, left foot (078 12) (B07 0)   39  Post traumatic stress disorder (309 81) (F43 10)   40  Preop examination (V72 84) (Z01 818)   41  Proteinuria (791 0) (R80 9)   42   Saddle anesthesia (782 0) (R20 0) 43  Sciatica, right side (724 3) (M54 31)   44  Sleep disturbance (780 50) (G47 9)   45  Therapeutic opioid induced constipation (564 09,E935 2) (K59 03,T40 2X5A)   46  Tooth pain (525 9) (K08 89)   47  Urinary incontinence (788 30) (R32)   48  Weakness of both lower extremities (729 89) (R29 898)   49  Weakness of right side of body (728 87) (R53 1)   50  Wegener's granulomatosis (446 4) (M31 30)   51  Wheezing (786 07) (R06 2)    Current Meds   1  Amphetamine-Dextroamphet ER 20 MG Oral Capsule Extended Release 24 Hour; Take   1 capsule twice daily; Therapy: 56QVN7065 to (Evaluate:95Ndc4216); Last Rx:53Hal7815 Ordered   2  FreeStyle Lite Test In Vitro Strip; TEST 3 TIMES DAILY; Therapy: 21Pjk6597 to (Evaluate:10Hjg2964)  Requested for: 73Gfa4980; Last   Rx:75Fbc6781 Ordered   3  Gabapentin 300 MG Oral Capsule; 1 capsule TID Recorded   4  Lisinopril 2 5 MG Oral Tablet; TAKE 1 TABLET DAILY; Therapy: 47Peu9695 to (Evaluate:63Uaw2571)  Requested for: 93Csz9612; Last   Rx:85Pby3124 Ordered   5  Methocarbamol 500 MG Oral Tablet; TAKE 1 TABLET 3 TIMES DAILY; Therapy: 93IVB0824 to (Evaluate:86Gak5726)  Requested for: 38Daa1182; Last   Rx:22Qja7103 Ordered   6  Oxycodone-Acetaminophen 7 5-325 MG Oral Tablet; TAKE 1 TABLET EVERY 6 HOURS   AS NEEDED FOR PAIN;   Therapy: 37GPW3812 to (Evaluate:55Lqd2716); Last Rx:31Acg5168 Ordered    Allergies    1  Bactrim TABS   2  Lexapro TABS   3  Navane CAPS   4  PROzac TABS    Message   Recorded as Task   Date: 08/28/2017 10:29 AM, Created By: Abdiel Markham   Task Name: Follow Up   Assigned To: Ayesha Harada   Regarding Patient: Phyllis Morrison, Status: In Progress   Kadeem Johns - 28 Aug 2017 10:29 AM     TASK CREATED  Caller: Self; [phone] (Home)  Pt says that you have referred her for home exercises  She says her ins told her to call [facility] office, but they won't return her calls  Please call her back about what to do     Belle Samayoa - 28 Aug 2017 10:58 AM     TASK REPLIED TO: Previously Assigned To Scotty visiting nurses number is [phone]  pt states you are to call this "doctors line" number to answer their questions regarding her care   RodriKulwinder acosta - 01 Sep 2017 12:18 PM     TASK REPLIED TO: Previously Assigned To Royce Jones,   I was wondering if you could help me out  I called [facility], but they say they have not seen the patient  I was wondering if you could try and call [address] and see if she can get home PT but her chronic pain and lower extremity weakness  She has difficulty with transportation make it to appointments on time, so would like to have home services for this  Thank you  Sintia Barros - 01 Sep 2017 1:49 PM     TASK IN Ana Stevens - 01 Sep 2017 1:49 PM     TASK EDITED  Yes I will look into that  I am familiar with the PT; 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  She had [place] at that time to get to her appointments but I will look into In-home care for her needs  Belle Samayoa - 06 Sep 2017 9:18 AM     TASK REPLIED TO: Previously Assigned To [place]  pt is calling again regarding this request-pt states it has been 3 weeks that she has been trying to get a home therapist  I advised pt our task is only active for a little over a week but apologized for the wait  Pt states the number she provided us with on 08/28 was the number that we need to call to set up [facility]'s physical therapy in home services  Pt is requesting a call back on the status of this request ASAP   [place] - 06 Sep 2017 11:53 AM     TASK REASSIGNED: Previously Assigned To Damon Mcgregor - 06 Sep 2017 1:10 PM     TASK REPLIED TO: Previously Assigned To [place]  SW spoke with Bobby Townsend from [address] [place] [address] Dr [phone] in regards to in-home physical therapy services for the PT    Gretchen Valera stated that going forward so that we are all aware, the referral must come directly from the providers office either by phone or by fax; that they do not get the information directly from [employer]  The referral portion is complete for the PT and the  PT will receive a call in 7-10 days by Physical therapy to schedule this service   SW called PT to make her aware of this  Sending it back to you so that you are aware of the referral process and that I can be of assistance with this if needed       Signatures   Electronically signed by : Wojciech Wilson; Sep  6 2017  1:37PM EST                       (Author)

## 2018-01-11 NOTE — RESULT NOTES
Verified Results  (1) URINALYSIS w URINE C/S REFLEX (will reflex a microscopy if leukocytes, occult blood, or nitrites are not within normal limits) 77VPO5677 06:22PM Junito Milan Order Number: [id number]     Test Name Result Flag Reference   COLOR Yellow     CLARITY Clear     PH UA 6 0  4 5-8 0   LEUKOCYTE ESTERASE UA Negative  Negative   NITRITE UA Negative  Negative   PROTEIN  (3+) mg/dl A Negative   GLUCOSE UA Negative mg/dl  Negative   KETONES UA Negative mg/dl  Negative   UROBILINOGEN UA 0 2 E U /dl  0 2, 1 0 E U /dl   BILIRUBIN UA Negative  Negative   BLOOD UA Trace A Negative   SPECIFIC GRAVITY UA 1 021  1 003-1 030   BACTERIA Occasional /hpf  None Seen, Occasional   EPITHELIAL CELLS None Seen /hpf  None Seen, Occasional   RBC UA 4-10 /hpf A None Seen   WBC UA 4-10 /hpf A None Seen   COLOR Yellow     CLARITY Clear     PH UA 6 0  4 5-8 0   LEUKOCYTE ESTERASE UA Negative  Negative   NITRITE UA Negative  Negative   PROTEIN  (3+) mg/dl A Negative   GLUCOSE UA Negative mg/dl  Negative   KETONES UA Negative mg/dl  Negative   UROBILINOGEN UA 0 2 E U /dl  0 2, 1 0 E U /dl   BILIRUBIN UA Negative  Negative   BLOOD UA Trace A Negative   SPECIFIC GRAVITY UA 1 021  1 003-1 030   BACTERIA Occasional /hpf  None Seen, Occasional   EPITHELIAL CELLS None Seen /hpf  None Seen, Occasional   RBC UA 4-10 /hpf A None Seen   WBC UA 4-10 /hpf A None Seen                   Discussion/Summary   Urine has some blood in it and protein  She should follow up with nephrology  There was no infection       Signatures   Electronically signed by : Charlene Dumont, Nya [facility]; Jan 19 2017  4:30PM EST                       (Author)

## 2018-01-11 NOTE — MISCELLANEOUS
Message   Recorded as Task   Date: 09/27/2017 08:20 AM, Created By: Elvia Donaldson   Task Name: Care Coordination   Assigned To: Mehran Mccurdy   Regarding Patient: Phyllis Morrison, Status: Active   CommentDenese Sic - 27 Sep 2017 8:20 AM     TASK CREATED  Caller: Self; [phone] (Home); [phone], Dr Bambi Huerta  we got something back from ins they want her to try and fail amitiza and lactulose before they approve relistor  I spoke to the patient and she has never tried them  Please advise what you want her to do  Plan    1   Amitiza 24 MCG Oral Capsule; TAKE 1 CAPSULE TWICE DAILY WITH FOOD    Signatures   Electronically signed by : Alejandra Meeks MD; Sep 29 2017 12:16PM EST                       (Author)

## 2018-01-12 VITALS
HEART RATE: 86 BPM | DIASTOLIC BLOOD PRESSURE: 80 MMHG | WEIGHT: 177 LBS | OXYGEN SATURATION: 100 % | HEIGHT: 61 IN | RESPIRATION RATE: 18 BRPM | SYSTOLIC BLOOD PRESSURE: 122 MMHG | TEMPERATURE: 96.1 F | BODY MASS INDEX: 33.42 KG/M2

## 2018-01-12 VITALS
RESPIRATION RATE: 20 BRPM | HEIGHT: 61 IN | DIASTOLIC BLOOD PRESSURE: 70 MMHG | TEMPERATURE: 97.7 F | HEART RATE: 103 BPM | BODY MASS INDEX: 38.96 KG/M2 | OXYGEN SATURATION: 97 % | WEIGHT: 206.38 LBS | SYSTOLIC BLOOD PRESSURE: 114 MMHG

## 2018-01-12 NOTE — RESULT NOTES
Verified Results  (Q) ANCA VASCULITIDES [id number] 02:09PM Luna Russell   REPORT COMMENT:  FASTING:NO     Test Name Result Flag Reference   MYELOPEROXIDASE ANTIBODY 1 3 AI H    Value        Interpretation       -----        --------------       <1 0         No Antibody Detected       > or = 1 0   Antibody Detected             Autoantibodies to myeloperoxidase (MPO) are commonly  associated with the following small-vessel vasculitides:  microscopic polyangitis, polyarteritis nodosa,   Churg-Florencio syndrome, necrotizing and crescentic   glomerulonephritis and occasionally Wegeners   granulomatosis  The perinuclear IFA pattern, (p-ANCA)   is based largely on autoantibody to myeloperoxidase   which serves as the primary antigen  These autoantibodies are present in active disease state  PROTEINASE-3 ANTIBODY <1 0 AI     Value        Interpretation       -----        --------------       <1 0         No Antibody Detected       > or = 1 0   Antibody Detected             Autoantibodies to proteinase-3 (SC-3) are accepted as  characteristic for granulomatosis with polyangiitis  (Wegener's), and are detectable in 95% of the   histologically proven cases  The cytoplasmic IFA   pattern,(c-ANCA), is based largely on autoantibody   to SC-3 which serves as the primary antigen  These autoantibodies are present in active disease  state

## 2018-01-13 VITALS
TEMPERATURE: 96.8 F | DIASTOLIC BLOOD PRESSURE: 88 MMHG | SYSTOLIC BLOOD PRESSURE: 132 MMHG | BODY MASS INDEX: 32.38 KG/M2 | OXYGEN SATURATION: 99 % | RESPIRATION RATE: 18 BRPM | HEIGHT: 61 IN | WEIGHT: 171.5 LBS | HEART RATE: 84 BPM

## 2018-01-13 VITALS
HEIGHT: 61 IN | WEIGHT: 183.06 LBS | HEART RATE: 100 BPM | RESPIRATION RATE: 18 BRPM | BODY MASS INDEX: 34.56 KG/M2 | DIASTOLIC BLOOD PRESSURE: 80 MMHG | OXYGEN SATURATION: 100 % | TEMPERATURE: 97.1 F | SYSTOLIC BLOOD PRESSURE: 112 MMHG

## 2018-01-13 VITALS
TEMPERATURE: 97.6 F | DIASTOLIC BLOOD PRESSURE: 86 MMHG | OXYGEN SATURATION: 97 % | BODY MASS INDEX: 37.76 KG/M2 | RESPIRATION RATE: 20 BRPM | HEIGHT: 61 IN | WEIGHT: 200 LBS | HEART RATE: 109 BPM | SYSTOLIC BLOOD PRESSURE: 132 MMHG

## 2018-01-13 VITALS
HEIGHT: 61 IN | SYSTOLIC BLOOD PRESSURE: 124 MMHG | RESPIRATION RATE: 20 BRPM | OXYGEN SATURATION: 97 % | WEIGHT: 165 LBS | TEMPERATURE: 97.5 F | DIASTOLIC BLOOD PRESSURE: 80 MMHG | HEART RATE: 88 BPM | BODY MASS INDEX: 31.15 KG/M2

## 2018-01-13 NOTE — RESULT NOTES
Verified Results  Diabetes Flow Sheet [id number] 12:00PM Esther Holt     Test Name Result [facility] Reference   Eye Exam 18NZN1709

## 2018-01-13 NOTE — MISCELLANEOUS
Message   Recorded as Task   Date: 11/21/2017 10:33 AM, Created By: Angelita Haney   Task Name: Miscellaneous   Assigned To: Angelita Haney   Regarding Patient: Denise Talavera, Status: Active   CommentShermon Dar - 21 Nov 2017 10:33 AM     TASK CREATED  Caller: Self; Other; [phone] (Home)  Patient said she was recently in ER with flank pain and ws told she has an infection  She just finished her anti-biotics and she is still experiencing the pain  She wanted to know if you wanted to do a urine test to check to see if she still has an infection? Maurilio Gregory - 22 Nov 2017 4:00 PM     TASK REPLIED TO: Previously Assigned To Maurilio Gregory  ok to repeat UA   I spoke to the patient and she is aware we can do a UA to make sure the antobiotics worked  AG      Active Problems    1  Abdominal pain (789 00) (R10 9)   2  Abnormal blood chemistry (790 6) (R79 9)   3  Acid reflux (530 81) (K21 9)   4  Acute gastritis (535 00) (K29 00)   5  Acute kidney injury (584 9) (N17 9)   6  Anemia of chronic disease (285 29) (D63 8)   7  Arthralgia of multiple joints (719 49) (M25 50)   8  Attention deficit hyperactivity disorder (314 01) (F90 9)   9  Benign hypertension with chronic kidney disease, stage III (403 10,585 3) (I12 9,N18 3)   10  Bilateral hip pain (719 45) (M25 551,M25 552)   11  Bipolar I disorder, most recent episode depressed, severe without psychotic features    (296 53) (F31 4)   12  Blood in stool (578 1) (K92 1)   13  Borderline personality disorder (301 83) (F60 3)   14  Breast cancer screening (V76 10) (Z12 39)   15  Cataplexy (347 01) (G47 411)   16  Cervical cancer screening (V76 2) (Z12 4)   17  Change in bowel habits (787 99) (R19 4)   18  Chronic kidney disease, stage 3 (585 3) (N18 3)   19  Chronic low back pain (724 2,338 29) (M54 5,G89 29)   20  Constipation (564 00) (K59 00)   21  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)   22  Cough (786 2) (R05)   23  Current use of insulin (V58 67) (Z79 4)   24   Diabetes mellitus type II, controlled (250 00) (E11 9)   25  Diabetic eye exam (V72 0,250 00) (E11 9,Z01 00)   26  Diabetic neuropathy (250 60,357 2) (E11 40)   27  Diarrhea (787 91) (R19 7)   28  Dizziness (780 4) (R42)   29  Dysphagia (787 20) (R13 10)   30  Dyspnea (786 09) (R06 00)   31  Encounter for diabetic foot exam (250 00) (E11 9)   32  Exposure to pneumonia (V01 79) (Z20 828)   33  Fatigue (780 79) (R53 83)   34  GERD (gastroesophageal reflux disease) (530 81) (K21 9)   35  Hematuria (599 70) (R31 9)   36  Hyperlipidemia (272 4) (E78 5)   37  Lung nodule (793 11) (R91 1)   38  Memory difficulties (780 93) (R41 3)   39  Nausea (787 02) (R11 0)   40  Nausea (787 02) (R11 0)   41  Nausea and vomiting (787 01) (R11 2)   42  Need for influenza vaccination (V04 81) (Z23)   43  Neuropathic pain (729 2) (M79 2)   44  Opiate use (305 50) (F11 90)   45  Ovarian cyst (620 2) (N83 209)   46  Pancreatitis (577 0) (K85 90)   47  Peripheral neuropathy (356 9) (G62 9)   48  Plantar wart, left foot (078 12) (B07 0)   49  Post traumatic stress disorder (309 81) (F43 10)   50  Preop examination (V72 84) (Z01 818)   51  Proteinuria (791 0) (R80 9)   52  Saddle anesthesia (782 0) (R20 0)   53  Sciatica, right side (724 3) (M54 31)   54  Sleep disturbance (780 50) (G47 9)   55  Therapeutic opioid induced constipation (564 09,E935 2) (K59 03,T40 2X5A)   56  Tooth pain (525 9) (K08 89)   57  Urinary incontinence (788 30) (R32)   58  Weakness of both lower extremities (729 89) (R29 898)   59  Weakness of right side of body (728 87) (R53 1)   60  Wegener's granulomatosis (446 4) (M31 30)   61  Wheezing (786 07) (R06 2)    Current Meds   1  Amitiza 24 MCG Oral Capsule; TAKE 1 CAPSULE TWICE DAILY WITH FOOD; Therapy[facility] 32NBS7780 [facility]  Requested for: 91ZEQ8471; Last   Rx:06Nsi6925 Ordered   2  Amphetamine-Dextroamphet ER 20 MG Oral Capsule Extended Release 24 Hour; Take   1 capsule twice daily;    Therapy: 02Jun2016 to (Evaluate:85Jak6946); Last Rx:75Ish5012 Ordered   3  Carafate 1 GM/10ML Oral Suspension; TAKE 10 ML 3 TIMES DAILY; Therapy: 03JPK1992 to (Evaluate:55Yuv6853)  Requested for: 08BKX9023; Last   Rx:51Php7414 Ordered   4  FreeStyle Lite Test In Vitro Strip; TEST 3 TIMES DAILY; Therapy: 11Lbc2291 to (Evaluate:30Klk2858)  Requested for: 36Nsu2541; Last   Rx:17Ghf2159 Ordered   5  Gabapentin 300 MG Oral Capsule; TAKE 1 CAPSULE 3 TIMES DAILY  Requested for:   76CBN4235; Last Rx:09Nov2017 Ordered   6  Lisinopril 2 5 MG Oral Tablet; TAKE 1 TABLET DAILY; Therapy: 35Oop5886 to (Evaluate:51Jqy8170)  Requested for: 09Mnw4582; Last   Rx:76Lbk6261 Ordered   7  Methocarbamol 500 MG Oral Tablet; TAKE 1 TABLET 3 TIMES DAILY; Therapy: 12DJS9111 to (Evaluate:67Iia3756)  Requested for: 82Aiv6213; Last   Rx:83Dfr9111 Ordered   8  Oxycodone-Acetaminophen 7 5-325 MG Oral Tablet; TAKE 1 TABLET EVERY 6 HOURS   AS NEEDED FOR PAIN;   Therapy: 03NKZ2743 to (Evaluate:24Tdq8071); Last Rx:09Nov2017 Ordered   9  Promethazine HCl - 12 5 MG Oral Tablet; TAKE 1 TABLET EVERY 6 HOURS AS   NEEDED FOR NAUSEA; Therapy: 91WVS8678 to (96 476345)  Requested for: 74QRL5157; Last   Rx:06Qig0047 Ordered   10  Relistor 150 MG Oral Tablet; TAKE 3 TABLET Daily; Therapy: 58BFH1450 to (BNSTWIZE:35EDH1357)  Requested for: 16VUQ7564; Last    Rx:74Gwp6357 Ordered   11  TiZANidine HCl - 4 MG Oral Tablet; TAKE 1 TABLET 3 TIMES DAILY AS NEEDED; Therapy: 21JZH2353 to (Evaluate:29Jan2018)  Requested for: 31Oct2017; Last    Rx:31Oct2017 Ordered    Allergies    1  Bactrim TABS   2  Lexapro TABS   3  Navane CAPS   4   PROzac TABS    Signatures   Electronically signed by : Alyx Jacobs DO; Nov 27 2017  4:10PM EST                       (Author)

## 2018-01-14 VITALS
OXYGEN SATURATION: 99 % | HEART RATE: 100 BPM | DIASTOLIC BLOOD PRESSURE: 76 MMHG | HEIGHT: 61 IN | BODY MASS INDEX: 36.63 KG/M2 | WEIGHT: 194 LBS | SYSTOLIC BLOOD PRESSURE: 118 MMHG | TEMPERATURE: 97.4 F | RESPIRATION RATE: 20 BRPM

## 2018-01-14 VITALS
TEMPERATURE: 97 F | OXYGEN SATURATION: 97 % | HEART RATE: 108 BPM | BODY MASS INDEX: 35.35 KG/M2 | DIASTOLIC BLOOD PRESSURE: 70 MMHG | RESPIRATION RATE: 20 BRPM | SYSTOLIC BLOOD PRESSURE: 110 MMHG | HEIGHT: 61 IN | WEIGHT: 187.25 LBS

## 2018-01-14 VITALS
SYSTOLIC BLOOD PRESSURE: 120 MMHG | OXYGEN SATURATION: 99 % | BODY MASS INDEX: 35.68 KG/M2 | TEMPERATURE: 97.3 F | HEIGHT: 61 IN | RESPIRATION RATE: 18 BRPM | WEIGHT: 189 LBS | HEART RATE: 88 BPM | DIASTOLIC BLOOD PRESSURE: 80 MMHG

## 2018-01-14 VITALS
BODY MASS INDEX: 36.16 KG/M2 | WEIGHT: 191.38 LBS | SYSTOLIC BLOOD PRESSURE: 120 MMHG | DIASTOLIC BLOOD PRESSURE: 82 MMHG | HEART RATE: 80 BPM

## 2018-01-14 VITALS
SYSTOLIC BLOOD PRESSURE: 126 MMHG | TEMPERATURE: 96.9 F | WEIGHT: 200 LBS | HEART RATE: 88 BPM | OXYGEN SATURATION: 98 % | DIASTOLIC BLOOD PRESSURE: 70 MMHG | BODY MASS INDEX: 37.76 KG/M2 | HEIGHT: 61 IN

## 2018-01-14 NOTE — MISCELLANEOUS
Provider Comments  Provider Comments:   Patient did not show for scheduled appointment    AG      Signatures   Electronically signed by : Carlos Kerr DO; Sep 14 2016  2:58PM EST                       (Author)

## 2018-01-15 NOTE — RESULT NOTES
Verified Results  (1) URINALYSIS w URINE C/S REFLEX (will reflex a microscopy if leukocytes, occult blood, or nitrites are not within normal limits) [id number] 07:05PM Thao Lobo Order Number: [id number]     Test Name Result Flag Reference   COLOR Yellow     CLARITY Clear     PH UA 5 5  4 5-8 0   LEUKOCYTE ESTERASE UA Negative  Negative   NITRITE UA Negative  Negative   PROTEIN  (2+) mg/dl A Negative   GLUCOSE UA Negative mg/dl  Negative   KETONES UA Negative mg/dl  Negative   UROBILINOGEN UA 0 2 E U /dl  0 2, 1 0 E U /dl   BILIRUBIN UA Negative  Negative   BLOOD UA Trace A Negative   SPECIFIC GRAVITY UA 1 022  1 003-1 030   BACTERIA Occasional /hpf  None Seen, Occasional   EPITHELIAL CELLS Occasional /hpf  None Seen, Occasional   HYALINE CASTS None Seen /lpf  None Seen   RBC UA 4-10 /hpf A None Seen, 0-5   WBC UA 4-10 /hpf A None Seen, 0-5, 5-55, 5-65       Plan  Diabetes mellitus type II, controlled    · FreeStyle Lite Test In Vitro Strip; TEST 3 TIMES DAILY

## 2018-01-15 NOTE — RESULT NOTES
Verified Results  * CT CHEST [address] [id number] 08:06PM Vickey Janet Order Number: [id number]    - Patient Instructions: To schedule this appointment, please contact Central Scheduling at [phone]  Test Name Result Flag Reference   CT CHEST WO CONTRAST (Report)     This is a summary report  The complete report is available in the patient's medical record  If you cannot access the medical record, please contact the sending organization for a detailed fax or copy  CT CHEST WITHOUT IV CONTRAST     INDICATION: Left-sided chest and arm pain     COMPARISON: 40 8/20/2016     TECHNIQUE: CT examination of the chest was performed without intravenous contrast  Reformatted images were created in axial, sagittal, and coronal planes  Radiation dose length product (DLP) for this visit: 397 94 mGy-cm   This examination, like all CT scans performed in the [facility], was performed utilizing techniques to minimize radiation dose exposure, including the use of iterative   reconstruction and automated exposure control  FINDINGS:     LUNGS: There is a left posterior costophrenic angle nodule measuring 2 mm on series 3, image 40  This may have been obscured by atelectasis on the previous study  There is no tracheal or endobronchial lesion  PLEURA: Unremarkable  HEART/GREAT VESSELS: Unremarkable for patient's age  MEDIASTINUM AND ANNABELLE: There is increased density in the superior anterior mediastinum consistent with thymic tissue  There is no discrete mass or lymphadenopathy though this is increased from the prior study  CHEST WALL AND LOWER NECK: Unremarkable  VISUALIZED STRUCTURES IN THE UPPER ABDOMEN: There is a stable 1 5 cm left adrenal adenoma  OSSEOUS STRUCTURES: No acute fracture  No destructive osseous lesion  IMPRESSION:     1  Increased thymic tissue without focal mass or lymphadenopathy  This is likely reactive       2  2 mm left lower lobe nodule  Based on current Fleischner Society 2017 Guidelines on incidental pulmonary nodule, no routine follow-up is needed if the patient is considered low risk for lung cancer  If the patient is considered high risk    for lung cancer, 12 month follow-up non-contrast chest CT is recommended  3  Stable left adrenal adenoma  ##sigslh##sigslh     ##fuslh12##fuslh12        Workstation performed: [id number]     Signed by:   Bhavya Turner MD   7/25/17   RAD_DOSE   Modality Radiation Exposure Data    Order Radiation    Type Dose Range    Radiation Dose 397 94 mGy-cm 0 - 6000 mGy-cm     * MRI LUMBAR SPINE WO CONTRAST [id number] 08:04PM Royer Tomlinson Order Number: [id number]    - Patient Instructions: To schedule this appointment, please contact Central Scheduling at [phone]  Test Name Result Flag Reference   MRI LUMBAR SPINE [address] (Report)     This is a summary report  The complete report is available in the patient's medical record  If you cannot access the medical record, please contact the sending organization for a detailed fax or copy  MRI LUMBAR SPINE WITHOUT CONTRAST     INDICATION: Chronic severe low back pain  COMPARISON: None  TECHNIQUE: Sagittal T1, sagittal T2, sagittal inversion recovery, axial T1 and axial T2, coronal T2       IMAGE QUALITY: Diagnostic     FINDINGS:     ALIGNMENT: Dextroscoliosis apex at L1  MARROW SIGNAL: Normal marrow signal is identified within the visualized bony structures  No discrete marrow lesion  DISTAL CORD AND CONUS: Normal size and signal within the distal cord and conus  The conus ends at the L1 level  PARASPINAL SOFT TISSUES: Paraspinal soft tissues are unremarkable  SACRUM: Normal signal within the sacrum  No evidence of insufficiency or stress fracture  LOWER THORACIC DISC SPACES: Normal disc height and signal  No disc herniation, canal stenosis or foraminal narrowing       LUMBAR DISC SPACES:        L1-L2: Normal      L2-L3: Mild facet hypertrophy without central or foraminal narrowing  L3-L4: Minimal facet hypertrophy without central or foraminal narrowing  L4-L5: Moderate facet hypertrophy without central or foraminal narrowing  L5-S1: Moderate left facet hypertrophy  Minimal left foraminal narrowing  Small marginal osteophyte on the left noted  IMPRESSION:     Multilevel facet hypertrophy as described resulting in minimal left L5-S1 foraminal narrowing          Workstation performed: [id number]     Signed by:   Corine Hahn DO   7/25/17     * [facility] [id number] 08:03PM Niki Chloe Order Number: [id number]    - Patient Instructions: To schedule this appointment, please contact Central Scheduling at [phone]  Test Name Result Flag Reference   US PELVIS COMPLETE (TRANSABDOMINAL AND TRANSVAGINAL) (Report)     This is a summary report  The complete report is available in the patient's medical record  If you cannot access the medical record, please contact the sending organization for a detailed fax or copy  Please note the following measurement of the uterus:     2 2 x 2 8 x 5 3 cm     ##cfslh   I personally discussed this result with Conchita HONG on 7/25/2017 4:33 PM    ##     PELVIC ULTRASOUND, COMPLETE     INDICATION: Follow-up cyst LMP was August 2015      COMPARISON: CT from 1/18/2017     TECHNIQUE:  Transabdominal pelvic ultrasound was performed in sagittal and transverse planes with a curvilinear transducer  Additional transvaginal imaging was performed to better evaluate the endometrium and ovaries  Imaging included volumetric    sweeps as well as traditional still imaging technique  FINDINGS:     UTERUS:   The uterus is anteverted in position, measuring   cm  Contour and echotexture appear normal      The cervix shows no suspicious abnormality  ENDOMETRIUM:    Normal caliber of 2 mm     Homogenous and normal in appearance  OVARIES/ADNEXA:   Right ovary: 1 3 x 1 2 x 2 1 cm  No suspicious right ovarian abnormality  Doppler flow within normal limits  Left ovary: 1 6 x 1 3 x 2 3 cm  No suspicious left ovarian abnormality  Doppler flow within normal limits  No suspicious adnexal mass or loculated collections  There is a left adnexal cyst separate from the left ovary measuring 1 9 x 3 0 x 3 4 cm  There is a thin avascular septation  This corresponds to the CT where it measured 3 2 x 3 6 cm  This has been   slowly increasing in size since 2015  There is no free fluid  IMPRESSION:      Cystic structure with thin avascular septation in the left adnexa, separate from the left ovary and corresponding to the previous CT  This is of uncertain etiology, particularly as it has been slowly enlarging since 2015 in this postmenopausal patient  A neoplastic etiology is not excluded, though there are no suspicious features such as vascularity to suggest malignancy  Follow-up Gyn-Onc consultation is recommended with continued ultrasound surveillance in 6 months if surgery is deferred        ##sigslh##sigslh     ##fuslh6##fuslh6       Workstation performed: [id number]     Signed by:   Connie Mart MD   7/25/17

## 2018-01-16 NOTE — RESULT NOTES
Verified Results  (1) CBC/PLT/DIFF [id number] 11:51AM Reesa November     Test Name Result Flag Reference   WHITE BLOOD CELL COUNT 4 3 Thousand/uL  3 8-10 8   RED BLOOD CELL COUNT 3 96 Million/uL  3 80-5 10   HEMOGLOBIN 11 3 g/dL L 11 7-15 5   HEMATOCRIT 35 8 %  35 0-45 0   MCV 90 3 fL  80 0-100 0   MCH 28 5 pg  27 0-33 0   MCHC 31 6 g/dL L 32 0-36 0   RDW 15 1 % H 11 0-15 0   PLATELET COUNT 322 Thousand/uL  140-400   MPV 9 5 fL  7 5-11 5   ABSOLUTE NEUTROPHILS 3075 cells/uL  6463-1956   ABSOLUTE LYMPHOCYTES 705 cells/uL L 850-3900   ABSOLUTE MONOCYTES 486 cells/uL  200-950   ABSOLUTE EOSINOPHILS 22 cells/uL     ABSOLUTE BASOPHILS 13 cells/uL  0-200   NEUTROPHILS 71 5 %     LYMPHOCYTES 16 4 %     MONOCYTES 11 3 %     EOSINOPHILS 0 5 %     BASOPHILS 0 3 %

## 2018-01-16 NOTE — RESULT NOTES
Verified Results  (1) URINALYSIS w URINE C/S REFLEX (will reflex a microscopy if leukocytes, occult blood, or nitrites are not within normal limits) [id number] 10:37AM Rocio Suarez Order Number: [id number]     Test Name Result Flag Reference   COLOR Yellow     CLARITY Cloudy     PH UA 6 0  4 5-8 0   LEUKOCYTE ESTERASE UA Negative  Negative   NITRITE UA Negative  Negative   PROTEIN UA >=1000 (4+) mg/dl A Negative   GLUCOSE UA Negative mg/dl  Negative   KETONES UA Negative mg/dl  Negative   UROBILINOGEN UA 0 2 E U /dl  0 2, 1 0 E U /dl   BILIRUBIN UA Negative  Negative   BLOOD UA Trace A Negative   SPECIFIC GRAVITY UA 1 024  1 003-1 030   BACTERIA Occasional /hpf  None Seen, Occasional   EPITHELIAL CELLS Moderate /hpf A None Seen, Occasional   HYALINE CASTS 5-10 /lpf A None Seen   RBC UA 2-4 /hpf A None Seen   WBC UA 10-20 /hpf A None Seen   CLINICAL REPORT (Report)     Test:        Urine culture  Specimen Type:   Urine  Specimen Date:   5/17/2016 10:37 AM  Result Date:    5/18/2016 2:28 PM  Result Status:   Final result  Resulting Lab:    [facility]            Tel: [phone]      CULTURE                                       ------------------                                   50,000-59,000 cfu/ml Mixed Contaminants X3

## 2018-01-17 NOTE — MISCELLANEOUS
Provider Comments  Provider Comments:   Patient did not show for scheduled visit    ag      Signatures   Electronically signed by : Joslyn Castillo DO; Oct 19 2016  4:31PM EST

## 2018-01-17 NOTE — MISCELLANEOUS
Message  GI Reminder Recall Ida Felix:   Date: 10/31/2017   Dear Cristiano Pace:     Review of our records shows you are due for the following: Follow Up Visit  Please call the following office to schedule your appointment:   [address], [address], [place], [place], [address]  [phone]  We look forward to hearing from you! Sincerely,     [facility]      Signatures   Electronically signed by :  Hi Freedman, ; Oct 31 2017 10:52AM EST                       (Author)

## 2018-01-18 ENCOUNTER — ALLSCRIPTS OFFICE VISIT (OUTPATIENT)
Dept: OTHER | Facility: OTHER | Age: 48
End: 2018-01-18

## 2018-01-18 NOTE — RESULT NOTES
Verified Results  (1) URINALYSIS w URINE C/S REFLEX (will reflex a microscopy if leukocytes, occult blood, or nitrites are not within normal limits) [id number] 05:27PM Ayesha Harada     Test Name Result Flag Reference   BACTERIA Occasional /hpf  None Seen, Occasional   EPITHELIAL CELLS Occasional /hpf  None Seen, Occasional   HYALINE CASTS None Seen /lpf  None Seen   RBC UA 2-4 /hpf A None Seen   WBC UA 10-20 /hpf A None Seen     (1) URINALYSIS w URINE C/S REFLEX (will reflex a microscopy if leukocytes, occult blood, or nitrites are not within normal limits) [id number] 05:27PM Ayesha Harada     Test Name Result Flag Reference   COLOR Yellow     CLARITY Clear     PH UA 6 0  4 5-8 0   LEUKOCYTE ESTERASE UA Small A Negative   NITRITE UA Negative  Negative   PROTEIN  (3+) mg/dl A Negative   GLUCOSE UA Negative mg/dl  Negative   KETONES UA Negative mg/dl  Negative   UROBILINOGEN UA 0 2 E U /dl  0 2, 1 0 E U /dl   BILIRUBIN UA Negative  Negative   BLOOD UA Trace A Negative   SPECIFIC GRAVITY UA 1 022  1 003-1 030

## 2018-01-18 NOTE — MISCELLANEOUS
Provider Comments  Provider Comments:   PATIENT WAS A NO SHOW FOR THE APPOINTMENT      Signatures   Electronically signed by : Danitza Steen DO; Feb 27 2017  2:09PM EST                       (Author)

## 2018-01-19 NOTE — PROGRESS NOTES
Assessment   1  Chronic kidney disease, stage 3 (585 3) (N18 3)   2  Microscopic polyangiitis (446 0) (M31 7)   3  Proteinuria (791 0) (R80 9)    Plan   Chronic kidney disease, stage 3    · (1) CBC/ PLT (NO DIFF); Status:Active; Requested for:02Jul2018; Perform:[facility]; LZZ:34PTO4675;ALJPKSL; For:Chronic kidney disease, stage 3; Ordered By:Maurilio Gregory;   · (1) MICROALBUMIN CREATININE RATIO, RANDOM URINE; Status:Active; Requested    for:02Jul2018; Perform:[facility]; OXS:06XFK4356;SEOEJNW; For:Chronic kidney disease, stage 3; Ordered By:Maurilio Gregory;   · (1) PTH N-TERMINAL (INTACT); Status:Active; Requested for:02Jul2018; Perform:[facility]; TWA:82ZFH2491;GXAVRLK; For:Chronic kidney disease, stage 3; Ordered By:Maurilio Gregory;   · (1) RENAL FUNCTION PANEL; Status:Active; Requested for:02Jul2018; Perform:[facility]; RAY:35GKS3650;RAPRODI; For:Chronic kidney disease, stage 3; Ordered By:Maurilio Gregory;   · (1) URINALYSIS WITH MICROSCOPIC; Status:Active; Requested for:02Jul2018; Perform:[facility]; IKO:86ACB9658;RZAWPRQ; For:Chronic kidney disease, stage 3; Ordered By:Maurilio Gregory;   · Follow-up visit in 6 months Evaluation and Treatment  Follow-up  Status: Hold For -    Scheduling  Requested for: 20GIX3336   Ordered; For: Chronic kidney disease, stage 3; Ordered By: Freddie Alegria Performed:  Due: 50NZZ3435    Discussion/Summary      Chronic kidney disease, stage III, baseline creatinine 1 7 and 2 0, most recent creatinine 1 8 with an estimated GFR of 33   to her kidney function has been fairly stable over the last year  Her underlying disease is most likely secondary to previous vasculitis  Her blood pressure is under good control without calcium channel blocker or ACE-inhibitor  Considering her recent hospitalizations and I will continue to hold her ACE-inhibitor given her risk for recurrent acute kidney injury   Her previous vasculitic profiles have been negative  So at this point time will follow conservatively, plan to see her approximately 6 months with repeat laboratory studies at that time  pancreatitis, recommended to the patient she follow-up with Gastroenterology  There is some question whether this is related to her vasculitis  However her vasculitic profiles have been negative  I have also encouraged her to follow up with Rheumatology is the may help in terms of further treatment if for pancreatitis is associated with her MPA   again at this point time her kidney function appears stable  Blood pressure is under control  Will hold off on ACE-inhibitor at this time  We will plan on seeing approximately 6 months with repeat laboratory studies at that time  Reason For Visit   Chronic kidney disease      History of Present Illness   We had the pleasure of seeing Regina Chiu for nephrology follow-up secondary to previous diagnosis of stage 3 chronic kidney disease  she has had multiple hospitalizations secondary to recurrent pancreatitis  During those admissions at time she does have elevations in her serum creatinine likely due to volume depletion  Her last discharge creatinine was noted to be 1 8    currently she still has fairly significant upper epigastric discomfort, appetite is certainly altered  She denies any significant chest pain or shortness of Breath  No reports of lower extremity swelling  Review of Systems        Constitutional: fatigue, but-- no fever  Integumentary: no rashes  Gastrointestinal: abdominal pain,-- nausea-- and-- vomiting  Respiratory: no shortness of breath-- and-- no cough  Cardiovascular: no chest pain-- and-- no lower extremity edema  Musculoskeletal: joint pain  Neurological: no lightheadedness-- and-- no dizziness  Genitourinary: no dysuria  Psychiatric: anxiety-- and-- depression  Current Meds    1   Amitiza 24 MCG Oral Capsule; TAKE 1 CAPSULE TWICE DAILY WITH FOOD; Therapy: 77NGB4192 to (Jorge Mary)  Requested for: 77BOI7920; Last     Rx:10Jan2018 Ordered   2  Amphetamine-Dextroamphet ER 20 MG Oral Capsule Extended Release 24 Hour; Take     1 capsule twice daily; Therapy: 08EZV4957 to (Evaluate:89Hap8076); Last Rx:05Jan2018 Ordered   3  Carafate 1 GM/10ML Oral Suspension; TAKE 10 ML 3 TIMES DAILY; Therapy: 98LUV3354 to (Evaluate:98Bdl3129)  Requested for: 18RED1262; Last     Rx:09Gdk1824 Ordered   4  FreeStyle Lite Test In Vitro Strip; TEST 3 TIMES DAILY; Therapy: 42Tvr1458 to (Consuelo Fitzgerald)  Requested for: 14ZYN7715; Last     Rx:96Fwt4300 Ordered   5  Gabapentin 300 MG Oral Capsule; TAKE 1 CAPSULE 3 TIMES DAILY  Requested for:     33SKG6977; Last Rx:09Nov2017 Ordered   6  LevoFLOXacin 250 MG Oral Tablet; Take 2 tablets today, then 1 tablet the next day, and     then 1 tablet every 48 hours until gone; Therapy: 02CAJ2971 to (May Riff)  Requested for: 86ICQ4261; Last     Rx:28Nov2017 Ordered   7  Lisinopril 2 5 MG Oral Tablet; TAKE 1 TABLET DAILY; Therapy: 57Fxe5263 to (Evaluate:56Gmm4838)  Requested for: 34Yti3120; Last     Rx:47Jvp7626 Ordered   8  Methocarbamol 500 MG Oral Tablet; TAKE 1 TABLET 3 TIMES DAILY; Therapy: 83YRD6870 to (Evaluate:26Ils0918)  Requested for: 93Meu2889; Last     Rx:21Saa0937 Ordered   9  Oxycodone-Acetaminophen 7 5-325 MG Oral Tablet; TAKE 1 TABLET EVERY 6 HOURS     AS NEEDED FOR PAIN;     Therapy: 88BII0174 to (Evaluate:37Atm4055); Last Rx:05Jan2018 Ordered   10  Promethazine HCl - 12 5 MG Oral Tablet; TAKE 1 TABLET EVERY 6 HOURS AS NEEDED      FOR NAUSEA; Therapy: 31KIN1204 to [phone])  Requested for: 06OLE9337; Last      Rx:93Cyu8546 Ordered   11  QUEtiapine Fumarate 100 MG Oral Tablet; TAKE 1 TABLET AT BEDTIME; Therapy: 11SLB8243 to (Evaluate:19Tsf0183)  Requested for: 06FQE2409; Last      Rx:28Nov2017 Ordered   12   Relistor 150 MG Oral Tablet; TAKE 3 TABLET Daily; Therapy: 51EAI4376 to (GHRGACFF:65URE9765)  Requested for: 97QIE6693; Last      Rx:91Osk3344 Ordered   13  TiZANidine HCl - 4 MG Oral Tablet; TAKE 1 TABLET 3 TIMES DAILY AS NEEDED; Therapy: 58BON4818 to [phone])  Requested for: 77GGS6359; Last      Rx:83Rjv3680 Ordered     The medication list was reviewed and updated today  Allergies   1  Bactrim TABS   2  Ibuprofen TABS   3  Lexapro TABS   4  Navane CAPS   5  PROzac TABS    Vitals   Vital Signs    Recorded: 50LIZ2884 04:58PM Recorded: 74NQE7098 04:34PM   Heart Rate  96   Respiration  16   Systolic 624 135, RUE, Sitting   Diastolic 60 614, RUE, Sitting   Height  5 ft 1 in   Weight  204 lb 8 oz   BMI Calculated  38 64   BSA Calculated  1 91     Physical Exam        Constitutional: General appearance: No acute distress, well appearing and well nourished  Eyes: Anicteric sclerae  JVD:  No JVD present  Pulmonary: Respiratory effort: No increased work of breathing or signs of respiratory distress  -- Auscultation of lungs: Clear to auscultation  Cardiovascular: Auscultation of heart: Normal rate and rhythm, normal S1 and S2, without murmurs  Abdomen: Abnormal   The abdomen was obese  There was mild tenderness in the epigastric area  Extremities: No cyanosis, clubbing or edema  Rash: No rash present        Neurologic: Non Focal          Psychiatric: Orientation to person, place, and time: Normal  -- and-- Mood and affect: Normal        Results/Data   (1) ANTIMYELOPEROXIDASE ANTIBODY 64CBS3541 05:21AM EPIC, Provider   Test ordered by: Nayan Quiros      Test Name Result Flag Reference   MPO ANTIBODY <9 0 U/mL  0 0 - 9 0   Performed at:  [address]  [id number]     : Indu Martinez MD, Phone:  [phone]      (1) NEUTROPHIL CYTOPLASMIN ANTIBODY 46FCI9835 05:21AM EPIC, Provider   Test ordered by: Filippo Arzate Name Result Flag Reference   CYTOPLAS-C ANCA <1:20 titer  Neg:<1:20   ATYPICAL ANCA <1:20 titer  Neg:<1:20   The atypical pANCA pattern has been observed in a significant     percentage of patients with ulcerative colitis, primary sclerosing     cholangitis and autoimmune hepatitis  MPO ANTIBODY <9 0 U/mL  0 0 - 9 0   DE-3 ANTIBODY <3 5 U/mL  0 0 - 3 5   P-ANCA <1:20 titer  Neg:<1:20   The presence of positive fluorescence exhibiting P-ANCA or C-ANCA     patterns alone is not specific for the diagnosis of Wegener's     Granulomatosis (WG) or microscopic polyangiitis  Decisions about     treatment should not be based solely on ANCA IFA results  The     International ANCA Group Consensus recommends follow up testing of     positive sera with both DE-3 and MPO-ANCA enzyme immunoassays  As     many as 5% serum samples are positive only by EIA  Ref  AM J Clin Pathol 1999;111:507-513  Performed at:  [address]  [id number]     : Nilson Galan MD, Phone:  [phone]      (1) LIPASE 90NWD1633 02:04PM [facility] Maurilio Order Number: [id number]      Test Name Result Flag Reference   LIPASE 340 u/L        (1) COMPREHENSIVE METABOLIC PANEL 28QCA4104 79:73NX [facility] Maurilio Order Number: [id number]      Test Name Result Flag Reference   GLUCOSE,RANDM 95 mg/dL     If the patient is fasting, the ADA then defines impaired fasting glucose as > 100 mg/dL and diabetes as > or equal to 123 mg/dL  Specimen collection should occur prior to Sulfasalazine administration due to the potential for falsely depressed results  Specimen collection should occur prior to Sulfapyridine administration due to the potential for falsely elevated results     SODIUM 136 mmol/L  136-145   POTASSIUM 4 8 mmol/L  3 5-5 3   CHLORIDE 105 mmol/L  100-108   CARBON DIOXIDE 23 mmol/L  21-32   ANION GAP (CALC) 8 mmol/L  4-13   BLOOD UREA NITROGEN 40 mg/dL H 5-25 CREATININE 1 97 mg/dL H 0 60-1 30   Standardized to IDMS reference method   CALCIUM 9 4 mg/dL  8 3-10 1   BILI, TOTAL 0 20 mg/dL  0 20-1 00   ALK PHOSPHATAS 127 U/L H    ALT (SGPT) 28 U/L  12-78   Specimen collection should occur prior to Sulfasalazine and/or Sulfapyridine administration due to the potential for falsely depressed results  AST(SGOT) 16 U/L  5-45   Specimen collection should occur prior to Sulfasalazine administration due to the potential for falsely depressed results  ALBUMIN 3 5 g/dL  3 5-5 0   TOTAL PROTEIN 7 9 g/dL  6 4-8 2   eGFR 30 ml/min/1 73sq m     [facility] Disease Education Program recommendations are as follows:     GFR calculation is accurate only with a steady state creatinine     Chronic Kidney disease less than 60 ml/min/1 73 sq  meters     Kidney failure less than 15 ml/min/1 73 sq  meters  (1) CBC/PLT/DIFF 16OUG5761 02:04PM Gabriela Santos      Test Name Result Flag Reference   WBC COUNT 5 88 Thousand/uL  4 31-10 16   RBC COUNT 4 10 Million/uL  3 81-5 12   HEMOGLOBIN 12 6 g/dL  11 5-15 4   HEMATOCRIT 38 0 %  34 8-46  1   MCV 93 fL  82-98   MCH 30 7 pg  26 8-34 3   MCHC 33 2 g/dL  31 4-37 4   RDW 13 9 %  11 6-15 1   MPV 10 6 fL  8 9-12 7   PLATELET COUNT 505 Thousands/uL  149-390   nRBC AUTOMATED 0 /100 WBCs     NEUTROPHILS RELATIVE PERCENT 63 %  43-75   LYMPHOCYTES RELATIVE PERCENT 28 %  14-44   MONOCYTES RELATIVE PERCENT 8 %  4-12   EOSINOPHILS RELATIVE PERCENT 1 %  0-6   BASOPHILS RELATIVE PERCENT 0 %  0-1   NEUTROPHILS ABSOLUTE COUNT 3 65 Thousands/? ??L  1 85-7 62   LYMPHOCYTES ABSOLUTE COUNT 1 67 Thousands/? ??L  0 60-4 47   MONOCYTES ABSOLUTE COUNT 0 49 Thousand/? ??L  0 17-1 22   EOSINOPHILS ABSOLUTE COUNT 0 04 Thousand/? ??L  0 00-0 61   BASOPHILS ABSOLUTE COUNT 0 01 Thousands/? ??L  0 00-0 10   This is a patient instruction: This test is non-fasting  Please drink two glasses of water morning of bloodwork          Future Appointments      Date/Time Provider Specialty Site   01/29/2018 09:30 AM Paola CUEVAS, Podiatry  [place] [address]     Signatures    Electronically signed by : Aman King DO; Jan 18 2018  5:00PM EST                       (Author)

## 2018-01-23 VITALS
DIASTOLIC BLOOD PRESSURE: 68 MMHG | SYSTOLIC BLOOD PRESSURE: 130 MMHG | RESPIRATION RATE: 20 BRPM | OXYGEN SATURATION: 95 % | HEIGHT: 61 IN | HEART RATE: 111 BPM | BODY MASS INDEX: 38.19 KG/M2 | TEMPERATURE: 98 F | WEIGHT: 202.25 LBS

## 2018-01-23 VITALS
WEIGHT: 204.5 LBS | HEART RATE: 96 BPM | HEIGHT: 61 IN | SYSTOLIC BLOOD PRESSURE: 120 MMHG | DIASTOLIC BLOOD PRESSURE: 60 MMHG | BODY MASS INDEX: 38.61 KG/M2 | RESPIRATION RATE: 16 BRPM

## 2018-01-23 NOTE — MISCELLANEOUS
Message    Patient called today complaining of increased flank and stomach pains, she said her percocet is not touching the pain and she's been on antibiotics in the past and the pain always remains  I suggested she go to the ER to be evaluated, if she's also having pain in her abdomen, more testing needs to be done to see what the cause is, it didn't sound kidney related  She agreed to go and to be evaluated  AG      Active Problems    1  Abdominal pain (789 00) (R10 9)   2  Abnormal blood chemistry (790 6) (R79 9)   3  Acid reflux (530 81) (K21 9)   4  Acute gastritis (535 00) (K29 00)   5  Acute kidney injury (584 9) (N17 9)   6  Anemia of chronic disease (285 29) (D63 8)   7  Arthralgia of multiple joints (719 49) (M25 50)   8  Attention deficit hyperactivity disorder (314 01) (F90 9)   9  Benign hypertension with chronic kidney disease, stage III (403 10,585 3) (I12 9,N18 3)   10  Bilateral hip pain (719 45) (M25 551,M25 552)   11  Bipolar I disorder, most recent episode depressed, severe without psychotic features    (296 53) (F31 4)   12  Blood in stool (578 1) (K92 1)   13  Borderline personality disorder (301 83) (F60 3)   14  Breast cancer screening (V76 10) (Z12 39)   15  Callus of foot (700) (L84)   16  Cataplexy (347 01) (G47 411)   17  Cervical cancer screening (V76 2) (Z12 4)   18  Change in bowel habits (787 99) (R19 4)   19  Chronic kidney disease, stage 3 (585 3) (N18 3)   20  Chronic low back pain (724 2,338 29) (M54 5,G89 29)   21  Constipation (564 00) (K59 00)   22  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)   23  Cough (786 2) (R05)   24  Current use of insulin (V58 67) (Z79 4)   25  Diabetes mellitus type II, controlled (250 00) (E11 9)   26  Diabetic eye exam (V72 0,250 00) (E11 9,Z01 00)   27  Diabetic neuropathy (250 60,357 2) (E11 40)   28  Diarrhea (787 91) (R19 7)   29  Dizziness (780 4) (R42)   30  Dysphagia (787 20) (R13 10)   31  Dyspnea (786 09) (R06 00)   32   Encounter for diabetic foot exam (250 00) (E11 9)   33  Exposure to pneumonia (V01 79) (Z20 828)   34  Fatigue (780 79) (R53 83)   35  GERD (gastroesophageal reflux disease) (530 81) (K21 9)   36  Hematuria (599 70) (R31 9)   37  Hyperlipidemia (272 4) (E78 5)   38  Lung nodule (793 11) (R91 1)   39  Memory difficulties (780 93) (R41 3)   40  Nausea (787 02) (R11 0)   41  Nausea (787 02) (R11 0)   42  Nausea and vomiting (787 01) (R11 2)   43  Need for influenza vaccination (V04 81) (Z23)   44  Neuropathic pain (729 2) (M79 2)   45  Opiate use (305 50) (F11 90)   46  Ovarian cyst (620 2) (N83 209)   47  Pancreatitis (577 0) (K85 90)   48  Peripheral neuropathy (356 9) (G62 9)   49  Plantar wart, left foot (078 12) (B07 0)   50  Post traumatic stress disorder (309 81) (F43 10)   51  Preop examination (V72 84) (Z01 818)   52  Proteinuria (791 0) (R80 9)   53  Saddle anesthesia (782 0) (R20 0)   54  Sciatica, right side (724 3) (M54 31)   55  Sleep disturbance (780 50) (G47 9)   56  Therapeutic opioid induced constipation (564 09,E935 2) (K59 03,T40 2X5A)   57  Tooth pain (525 9) (K08 89)   58  Urinary frequency (788 41) (R35 0)   59  Urinary incontinence (788 30) (R32)   60  Urinary tract infection (599 0) (N39 0)   61  Weakness of both lower extremities (729 89) (R29 898)   62  Weakness of right side of body (728 87) (R53 1)   63  Wegener's granulomatosis (446 4) (M31 30)   64  Wheezing (786 07) (R06 2)    Current Meds   1  Amitiza 24 MCG Oral Capsule; TAKE 1 CAPSULE TWICE DAILY WITH FOOD; Therapy: 68YGT2320 to (NWUDCTNR:28NAE0112)  Requested for: 63NRS2461; Last   Rx:39Tey8525 Ordered   2  Amphetamine-Dextroamphet ER 20 MG Oral Capsule Extended Release 24 Hour; Take   1 capsule twice daily; Therapy: 26ILZ1638 to (SLPKCORM:13XFG4565); Last Rx:68Krl5074 Ordered   3  Carafate 1 GM/10ML Oral Suspension; TAKE 10 ML 3 TIMES DAILY; Therapy: 43JZZ8289 to (Evaluate:57Fdy9199)  Requested for: 72NBS7730; Last   Rx:22Sep2017 Ordered   4   FreeStyle Lite Test In Vitro Strip; TEST 3 TIMES DAILY; Therapy: 46Xyx1639 to (Gómez Chu)  Requested for: 66YXM3667; Last   Rx:30Ddw2984 Ordered   5  Gabapentin 300 MG Oral Capsule; TAKE 1 CAPSULE 3 TIMES DAILY  Requested for:   15CWB3141; Last Rx:09Nov2017 Ordered   6  LevoFLOXacin 250 MG Oral Tablet; Take 2 tablets today, then 1 tablet the next day, and   then 1 tablet every 48 hours until gone; Therapy: 25EEM3996 to (Marquis Gabriel)  Requested for: 37FGD5743; Last   Rx:28Nov2017 Ordered   7  Lisinopril 2 5 MG Oral Tablet; TAKE 1 TABLET DAILY; Therapy: 31Ske4359 to (Evaluate:47Wzc0954)  Requested for: 28Ldr1712; Last   Rx:37Ret1952 Ordered   8  Methocarbamol 500 MG Oral Tablet; TAKE 1 TABLET 3 TIMES DAILY; Therapy: 45HXL8180 to (Evaluate:56Ypu1972)  Requested for: 86Qsf7956; Last   Rx:43Tmj2293 Ordered   9  Oxycodone-Acetaminophen 7 5-325 MG Oral Tablet; TAKE 1 TABLET EVERY 6 HOURS   AS NEEDED FOR PAIN;   Therapy: 11TCD1465 to (Evaluate:04Jan2018); Last Rx:94Xls3279 Ordered   10  Promethazine HCl - 12 5 MG Oral Tablet; TAKE 1 TABLET EVERY 6 HOURS AS    NEEDED FOR NAUSEA; Therapy: 91AIG0275 to (03 17 74 30 53)  Requested for: 46NHA0140; Last    Rx:40Cds1998 Ordered   11  QUEtiapine Fumarate 100 MG Oral Tablet; TAKE 1 TABLET AT BEDTIME; Therapy: 81ZBH0270 to (Evaluate:23Gdc4498)  Requested for: 68DQO8590; Last    Rx:28Nov2017 Ordered   12  Relistor 150 MG Oral Tablet; TAKE 3 TABLET Daily; Therapy: 73LQQ4041 to (UHAAZCXE:25EOW3071)  Requested for: 72IIN9097; Last    Rx:58Xxo6300 Ordered   13  TiZANidine HCl - 4 MG Oral Tablet; TAKE 1 TABLET 3 TIMES DAILY AS NEEDED; Therapy: 93PCY3381 to (Evaluate:29Jan2018)  Requested for: 31Oct2017; Last    Rx:31Oct2017 Ordered    Allergies    1  Bactrim TABS   2  Ibuprofen TABS   3  Lexapro TABS   4  Navane CAPS   5   PROzac TABS    Signatures   Electronically signed by : Santa Tyson DO; Dec 21 2017  2:46PM EST                       (Author)

## 2018-01-23 NOTE — MISCELLANEOUS
Discussion/Summary  Discussion Summary:   Patient did not schedule ANCA appointment  History of Present Illness  TCM Communication [facility]: The patient is being contacted for follow-up after hospitalization  She was hospitalized at [facility]  The date of admission: 01/06/18, date of discharge: 01/07/18  Diagnosis: EPIGASTRIC PAIN  She was discharged to home  Medications reviewed and updated today  She scheduled a follow up appointment  Follow-up appointments with other specialists: NEPHROLOGY 01/18/18 4:30PM  The patient is currently asymptomatic  Counseling was provided to the patient  Communication performed and completed by Avis Westbrook   HPI: TCMXX/NC      Active Problems    1  Abdominal pain (789 00) (R10 9)   2  Abnormal blood chemistry (790 6) (R79 9)   3  Acid reflux (530 81) (K21 9)   4  Acute gastritis (535 00) (K29 00)   5  Acute kidney injury (584 9) (N17 9)   6  Anemia of chronic disease (285 29) (D63 8)   7  Arthralgia of multiple joints (719 49) (M25 50)   8  Attention deficit hyperactivity disorder (314 01) (F90 9)   9  Benign hypertension with chronic kidney disease, stage III (403 10,585 3) (I12 9,N18 3)   10  Bilateral hip pain (719 45) (M25 551,M25 552)   11  Bipolar I disorder, most recent episode depressed, severe without psychotic features    (296 53) (F31 4)   12  Blood in stool (578 1) (K92 1)   13  Borderline personality disorder (301 83) (F60 3)   14  Breast cancer screening (V76 10) (Z12 39)   15  Callus of foot (700) (L84)   16  Cataplexy (347 01) (G47 411)   17  Cervical cancer screening (V76 2) (Z12 4)   18  Change in bowel habits (787 99) (R19 4)   19  Chronic low back pain (724 2,338 29) (M54 5,G89 29)   20  Constipation (564 00) (K59 00)   21  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)   22  Cough (786 2) (R05)   23  Current use of insulin (V58 67) (Z79 4)   24  Diabetes mellitus type II, controlled (250 00) (E11 9)   25  Diabetic eye exam (V72 0,250 00) (E11 9,Z01 00)   26   Diabetic neuropathy (250 60,357 2) (E11 40)   27  Diarrhea (787 91) (R19 7)   28  Dizziness (780 4) (R42)   29  Dysphagia (787 20) (R13 10)   30  Dyspnea (786 09) (R06 00)   31  Encounter for diabetic foot exam (250 00) (E11 9)   32  Exposure to pneumonia (V01 79) (Z20 828)   33  Fatigue (780 79) (R53 83)   34  GERD (gastroesophageal reflux disease) (530 81) (K21 9)   35  Hematuria (599 70) (R31 9)   36  Hyperlipidemia (272 4) (E78 5)   37  Lung nodule (793 11) (R91 1)   38  Memory difficulties (780 93) (R41 3)   39  Nausea (787 02) (R11 0)   40  Nausea (787 02) (R11 0)   41  Nausea and vomiting (787 01) (R11 2)   42  Need for influenza vaccination (V04 81) (Z23)   43  Neuropathic pain (729 2) (M79 2)   44  Opiate use (305 50) (F11 90)   45  Ovarian cyst (620 2) (N83 209)   46  Pancreatitis (577 0) (K85 90)   47  Peripheral neuropathy (356 9) (G62 9)   48  Plantar wart, left foot (078 12) (B07 0)   49  Post traumatic stress disorder (309 81) (F43 10)   50  Preop examination (V72 84) (Z01 818)   51  Saddle anesthesia (782 0) (R20 0)   52  Sciatica, right side (724 3) (M54 31)   53  Sleep disturbance (780 50) (G47 9)   54  Therapeutic opioid induced constipation (564 09,E935 2) (K59 03,T40 2X5A)   55  Tooth pain (525 9) (K08 89)   56  Urinary frequency (788 41) (R35 0)   57  Urinary incontinence (788 30) (R32)   58  Urinary tract infection (599 0) (N39 0)   59  Weakness of both lower extremities (729 89) (R29 898)   60  Weakness of right side of body (728 87) (R53 1)   61  Wegener's granulomatosis (446 4) (M31 30)   62  Wheezing (786 07) (R06 2)    Surgical History    1  History of Release Of Hand Scar Contracture Extensor With Skin Grafts    Family History  Mother    1  Denied: Family history of Drug abuse   2  No family history of mental disorder  Father    3  Denied: Family history of Drug abuse   4   No family history of mental disorder    Social History    · Denied: Alcohol use (V45 89) (Z78 9)   · Daily caffeine consumption, 1 serving a day   · Denied: Drug use (305 90) (F19 90)   · Former smoker (V15 82) (A93 214)   · No drug use   · No preference on Latter day beliefs   · Social history reviewed, unchanged   · Uses marijuana (305 20) (F12 90)    Current Meds   1  Amitiza 24 MCG Oral Capsule; TAKE 1 CAPSULE TWICE DAILY WITH FOOD; Therapy: 22AKU3328 to (BQDKSUWS:70ZEA1137)  Requested for: 13MUY1809; Last   Rx:06Zvx1639 Ordered   2  Amphetamine-Dextroamphet ER 20 MG Oral Capsule Extended Release 24 Hour; Take   1 capsule twice daily; Therapy: 04LNF3543 to (Evaluate:66Doc5782); Last Rx:05Jan2018 Ordered   3  Carafate 1 GM/10ML Oral Suspension; TAKE 10 ML 3 TIMES DAILY; Therapy: 36LEI6721 to (Evaluate:55Rat2829)  Requested for: 34NRD3755; Last   Rx:16Yai7080 Ordered   4  FreeStyle Lite Test In Vitro Strip; TEST 3 TIMES DAILY; Therapy: 73Muu6690 to (Cande Baca)  Requested for: 66ZQW0425; Last   Rx:10Lxh9655 Ordered   5  Gabapentin 300 MG Oral Capsule; TAKE 1 CAPSULE 3 TIMES DAILY  Requested for:   82OYZ6011; Last Rx:09Nov2017 Ordered   6  LevoFLOXacin 250 MG Oral Tablet; Take 2 tablets today, then 1 tablet the next day, and   then 1 tablet every 48 hours until gone; Therapy: 08YKJ2326 to (Alma Rosa Milligan)  Requested for: 61HDK4680; Last   Rx:28Nov2017 Ordered   7  Lisinopril 2 5 MG Oral Tablet; TAKE 1 TABLET DAILY; Therapy: 10Avt8427 to (Evaluate:21Qkb8633)  Requested for: 99Zmp5987; Last   Rx:13Qya7220 Ordered   8  Methocarbamol 500 MG Oral Tablet; TAKE 1 TABLET 3 TIMES DAILY; Therapy: 57EAS5887 to (Evaluate:84Okn5440)  Requested for: 58Ynw5114; Last   Rx:96Vcf7604 Ordered   9  Oxycodone-Acetaminophen 7 5-325 MG Oral Tablet; TAKE 1 TABLET EVERY 6 HOURS   AS NEEDED FOR PAIN;   Therapy: 63TKJ3061 to (Evaluate:86Ova0821); Last Rx:05Jan2018 Ordered   10  Promethazine HCl - 12 5 MG Oral Tablet; TAKE 1 TABLET EVERY 6 HOURS AS NEEDED    FOR NAUSEA;     Therapy: 75ZJR5558 to (266 83 27 11)  Requested for: 69TSE6503; Last    Rx:15Isq8726 Ordered   11  QUEtiapine Fumarate 100 MG Oral Tablet; TAKE 1 TABLET AT BEDTIME; Therapy: 66VZG6941 to (Evaluate:03Dhx0543)  Requested for: 27VBT5371; Last    Rx:32Rkm9784 Ordered   12  Relistor 150 MG Oral Tablet; TAKE 3 TABLET Daily; Therapy: 44PCI0705 to (UKHZTETS:05WAH1838)  Requested for: 11JOD5140; Last    Rx:69Efh5824 Ordered   13  TiZANidine HCl - 4 MG Oral Tablet; TAKE 1 TABLET 3 TIMES DAILY AS NEEDED; Therapy: 12VBV2144 to (Evaluate:29Jan2018)  Requested for: 82Bqm9584; Last    Rx:42Ukp0331 Ordered    Allergies    1  Bactrim TABS   2  Ibuprofen TABS   3  Lexapro TABS   4  Navane CAPS   5  PROzac TABS    Message   Recorded as Task   Date: 01/07/2018 05:53 PM, Created By: System   Task Name: [facility] ANCA   Assigned To: [facility],CLINICAL TEAM   Regarding Patient: Valentina Elliott, Status: Active   Comment:    System - 07 Jan 2018 5:53 PM     Patient discharged from hospital   Patient Name: Peng Green  Patient YOB: 1970  Discharge Date: 1/7/2018  [facility] - 08 Jan 2018 7:28 AM     TASK REASSIGNED: Previously Assigned To Yary Quick - 08 Jan 2018 10:04 AM     TASK REASSIGNED: Previously Assigned To [facility],CLINICAL TEAM  I contact pt and pt states followed your advise cause wasn't feeling good and since you ordered bw pt wants to know If you want to se her again, pt states it's up to you, pt is trying to f/u diet  Kulwinder Mace - 09 Jan 2018 7:57 AM     TASK REPLIED TO: Previously Assigned To Kulwinder Mace  There is not much that I can do  Lab work came back normal   Symptoms most likely due to her not following her diet  Strongly encouraged that she follow up with Rosemary Jang - 09 Jan 2018 10:56 AM     TASK REASSIGNED: Previously Assigned To [facility],CLINICAL TEAM  pt is informed, pt will f/u with GI    TCMXX/NC     Future Appointments    Date/Time Provider Specialty Site   01/29/2018 09:30 ARCHIE Þoralexandria CUEVAS, Podiatry  [facility]     Signatures   Electronically signed by : Radha Sneed, ; Jan 9 2018 11:00AM EST                       (Author)    Electronically signed by : Nova Ann; Jan 21 2018  5:10PM EST                       (Author)    Electronically signed by : PHANI Otto ; Jan 22 2018  9:14AM EST

## 2018-01-23 NOTE — MISCELLANEOUS
FIRST NO-SHOW WARNING LETTER  PRIMER AVISO DE NO-SHOW   Dear Nicholas Pinto:    Padmini Mckeon have had _2 _ No-Show appointments at our office 7/19/17 10AM,12/27/17 340PM   A No-Show is defined as any patient who fails to arrive for a scheduled appointment without canceling the appointment prior to the scheduled time  A patient who has more than THREE No-Shows may be dismissed from an [address] [place] [address] practice  Please call in advance to cancel appointments  If you continue to No-Show for scheduled appointments, you may be dismissed from  our practice  Thank You  Usted ha tenido  _2_ No-Show citas en nuestra oficina 7/19/17 10AM,12/27/17 340PM   Un No-Show se define jagdish cualquier paciente que no llega a yani heraclio programada sin cancelar la heraclio antes de la hora programada  Un paciente que tiene más de JEREMIAS No-Show puede ser despedido de un [facility] práctica  Por favor llame con anticipación para cancelar citas  Si continúa la "No-Show" para las citas programadas, usted puede ser despedido de nuestra práctica  Ericka

## 2018-01-23 NOTE — MISCELLANEOUS
Message  Discussed with patient regarding her concern for significant left flank pain, lower back pain, bilateral groin pain  She had multiple issues with pain issues in the past  She was recently seen in the ER about a month ago when she was treated for presumed UTI although her urine culture was found to be negative  She also had a repeat urinalysis one week ago which showed 4 to 10 WBCs, occasional bacteria  She denies any fever or chills  She denies any foul smell or cloudy urine  Denies any dysuria  No abdominal pain  At this time will check urinalysis with microscopy with reflex urine culture and treat with antibiotic if it is positive  She is agreeable with the plan  I will strongly advised to go to ER if she has significant worsening in her pain  She also had recent CT scan in October 2017 which was negative for any acute pathology  She has stable small left adrenal adenoma        Signatures   Electronically signed by : PHANI Shields ; Dec  4 2017  1:08PM EST                       (Author)

## 2018-01-23 NOTE — RESULT NOTES
Verified Results  (1) LIPASE 29HMQ6578 02:04PM Stefanie Parker Order Number: [id number]     Test Name Result Flag Reference   LIPASE 340 u/L       (1) COMPREHENSIVE METABOLIC PANEL 81TRO2381 73:57FH Stefanie Parker Order Number: [id number]     Test Name Result Flag Reference   GLUCOSE,RANDM 95 mg/dL     If the patient is fasting, the ADA then defines impaired fasting glucose as > 100 mg/dL and diabetes as > or equal to 123 mg/dL  Specimen collection should occur prior to Sulfasalazine administration due to the potential for falsely depressed results  Specimen collection should occur prior to Sulfapyridine administration due to the potential for falsely elevated results  SODIUM 136 mmol/L  136-145   POTASSIUM 4 8 mmol/L  3 5-5 3   CHLORIDE 105 mmol/L  100-108   CARBON DIOXIDE 23 mmol/L  21-32   ANION GAP (CALC) 8 mmol/L  4-13   BLOOD UREA NITROGEN 40 mg/dL H 5-25   CREATININE 1 97 mg/dL H 0 60-1 30   Standardized to IDMS reference method   CALCIUM 9 4 mg/dL  8 3-10 1   BILI, TOTAL 0 20 mg/dL  0 20-1 00   ALK PHOSPHATAS 127 U/L H    ALT (SGPT) 28 U/L  12-78   Specimen collection should occur prior to Sulfasalazine and/or Sulfapyridine administration due to the potential for falsely depressed results  AST(SGOT) 16 U/L  5-45   Specimen collection should occur prior to Sulfasalazine administration due to the potential for falsely depressed results  ALBUMIN 3 5 g/dL  3 5-5 0   TOTAL PROTEIN 7 9 g/dL  6 4-8 2   eGFR 30 ml/min/1 73sq m     [facility] Disease Education Program recommendations are as follows:  GFR calculation is accurate only with a steady state creatinine  Chronic Kidney disease less than 60 ml/min/1 73 sq  meters  Kidney failure less than 15 ml/min/1 73 sq  meters       (1) CBC/PLT/DIFF 01ZMF3688 02:04PM Francoise Shown     Test Name Result Flag Reference   WBC COUNT 5 88 Thousand/uL  4 31-10 16   RBC COUNT 4 10 Million/uL  3 81-5 12   HEMOGLOBIN 12 6 g/dL  11 5-15 4   HEMATOCRIT 38 0 %  34 8-46  1   MCV 93 fL  82-98   MCH 30 7 pg  26 8-34 3   MCHC 33 2 g/dL  31 4-37 4   RDW 13 9 %  11 6-15 1   MPV 10 6 fL  8 9-12 7   PLATELET COUNT 577 Thousands/uL  149-390   nRBC AUTOMATED 0 /100 WBCs     NEUTROPHILS RELATIVE PERCENT 63 %  43-75   LYMPHOCYTES RELATIVE PERCENT 28 %  14-44   MONOCYTES RELATIVE PERCENT 8 %  4-12   EOSINOPHILS RELATIVE PERCENT 1 %  0-6   BASOPHILS RELATIVE PERCENT 0 %  0-1   NEUTROPHILS ABSOLUTE COUNT 3 65 Thousands/? ??L  1 85-7 62   LYMPHOCYTES ABSOLUTE COUNT 1 67 Thousands/? ??L  0 60-4 47   MONOCYTES ABSOLUTE COUNT 0 49 Thousand/? ??L  0 17-1 22   EOSINOPHILS ABSOLUTE COUNT 0 04 Thousand/? ??L  0 00-0 61   BASOPHILS ABSOLUTE COUNT 0 01 Thousands/? ??L  0 00-0 10   This is a patient instruction: This test is non-fasting  Please drink two glasses of water morning of bloodwork         Plan  Cervical cancer screening    · *1 - [facility] Physician Referral  Consult  [place] [address] [address] [facility]  [phone]  Status: Active  Requested for: 13WMT9879  Care Summary provided  : Yes

## 2018-01-23 NOTE — MISCELLANEOUS
Assessment    1  Pancreatitis (577 0) (K85 90)    Plan  Bipolar I disorder, most recent episode depressed, severe without psychotic features,  Conversion Disorder    · Amphetamine-Dextroamphet ER 20 MG Oral Capsule Extended Release 24  Hour; Take 1 capsule twice daily   Rx By: Emanuel Andrade; Dispense: 30 Days ; #:60 Capsule Extended Release 24 Hour; Refill: 0; For: Bipolar I disorder, most recent episode depressed, severe without psychotic features, Conversion Disorder; DEBORAH = N; Print Rx; Msg to Pharmacy: Continuation of therapy  Cervical cancer screening    · *1 - [address] [facility] Physician Referral  Consult  3 [facility]  [phone]  Status: Active - Retrospective By Protocol Authorization  Requested for:  [id number]   Ordered; For: Cervical cancer screening; Ordered By: Emanuel Andrade Performed:  Due: 01VSI4545; Last Updated By: Jennifer Llamas; 1/4/2018 2:45:19 PM  Care Summary provided  : Yes  Pancreatitis    · (1) CBC/PLT/DIFF; Status:Complete;   Done: 49XEL4278 02:04PM   Perform:  [address]; CXV:[id number]; Last Updated By:Giulia Galvez; 1/5/2018 2:05:38 PM;Ordered; For:Pancreatitis; Ordered By:Kulwinder Mace;   · (1) COMPREHENSIVE METABOLIC PANEL; Status:Complete - Specimen/Data Collected;    Done: 30UWB4781 02:06PM   Perform:[facility] In Office Collection; Last Updated By:Rosemary Galvez; 1/5/2018 2:06:39 PM;Ordered; For:Pancreatitis; Ordered By:Kulwinder Mace;   · (1) LIPASE; Status:Complete - Specimen/Data Collected;   Done: 04OJV0235 02:06PM   Perform:[facility] In Office Collection; Last Updated By:Rosemary Galvez; 1/5/2018 2:07:07 PM;Ordered; For:Pancreatitis; Ordered By:Kulwinder Mace;   · * MRI ABDOMEN W WO CONTRAST AND MRCP; Status:Need Information - Financial  Authorization; Requested VWZ:[id number]; Perform:[facility]; KLR:[id number];QTHCAZB; For:Pancreatitis;  Ordered By:Rodri Carolina Mix;  Wegener's granulomatosis    · Oxycodone-Acetaminophen 7 5-325 MG Oral Tablet; TAKE 1 TABLET EVERY 6  HOURS AS NEEDED FOR PAIN   Rx By: Santa Tyson; Dispense: 30 Days ; #:120 Tablet; Refill: 0; For: Wegener's granulomatosis; DEBORAH = N; Print Rx; Msg to Pharmacy: Continuation of therapy  Discussion/Summary  Discussion Summary:   Discussed with patient that the main way to control her symptoms right now is with a low-fat diet, would recommend a BRAT diet  Will get lab work completed today  If there is any concern with critical labs will recommend to go to the ED  Make appointment with GI for follow-up  Also ordered MRCP for further evaluation  Medication SE Review and Pt Understands Tx: Possible side effects of new medications were reviewed with the patient/guardian today  The treatment plan was reviewed with the patient/guardian  The patient/guardian understands and agrees with the treatment plan   Self Referrals:   Self Referrals: No      Chief Complaint  Chief Complaint Free Text Note Form: pt here today to f/u from [facility] d/c on 12/25 DX pancreatitis  pt also request refills  pt states she is still having pain, also with nausea  pt states after she eats she is in more pain      History of Present Illness  TCM Communication [facility]: The patient is being contacted for follow-up after hospitalization  She was hospitalized at [facility]  The date of admission: 12/21/17, date of discharge: 12/25/17  Diagnosis: ACUTE PANCREATITIS  She was discharged to home  Medications reviewed and updated today  She scheduled a follow up appointment  Follow-up appointments with other specialists: PCP MARIANELA Kohli 01/05/18 1 pm  The patient is currently asymptomatic  Counseling was provided to the patient  Communication performed and completed by Louis Dotson   HPI: 49-year-old female presenting for follow-up of acute pancreatitis  Patient was admitted from December 21, 2017 to December 25, 2017   On discharge from the hospital patient was told to have a low-fat diet, however being the holidays and being with family she was eating an on healthy diet  States she was eating a typical [place], which can consist of fatty foods  States that yesterday she had eggs and Jell-O  She states that her abdominal pain has been increasing  Describes as a sharp epigastric pain  Pain is now radiating to her back  Is having mild nausea without any vomiting  Has not made a follow-up appointment with GI yet  Was recommended in the hospital to get MRCP done  Review of Systems  Complete-Female:   Constitutional: no fever, not feeling poorly, no chills and not feeling tired  Cardiovascular: No complaints of slow heart rate, no fast heart rate, no chest pain, no palpitations, no leg claudication, no lower extremity edema  Respiratory: No complaints of shortness of breath, no wheezing, no cough, no SOB on exertion, no orthopnea, no PND  Gastrointestinal: as noted in HPI  Genitourinary: no dysuria  Neurological: No complaints of headache, no confusion, no convulsions, no numbness, no dizziness or fainting, no tingling, no limb weakness, no difficulty walking  ROS Reviewed:   ROS reviewed  Active Problems    1  Abdominal pain (789 00) (R10 9)   2  Abnormal blood chemistry (790 6) (R79 9)   3  Acid reflux (530 81) (K21 9)   4  Acute gastritis (535 00) (K29 00)   5  Acute kidney injury (584 9) (N17 9)   6  Anemia of chronic disease (285 29) (D63 8)   7  Arthralgia of multiple joints (719 49) (M25 50)   8  Attention deficit hyperactivity disorder (314 01) (F90 9)   9  Benign hypertension with chronic kidney disease, stage III (403 10,585 3) (I12 9,N18 3)   10  Bilateral hip pain (719 45) (M25 551,M25 552)   11  Bipolar I disorder, most recent episode depressed, severe without psychotic features    (296 53) (F31 4)   12  Blood in stool (578 1) (K92 1)   13  Borderline personality disorder (301 83) (F60 3)   14   Breast cancer screening (V76 10) (Z12 39)   15  Callus of foot (700) (L84)   16  Cataplexy (347 01) (G47 411)   17  Cervical cancer screening (V76 2) (Z12 4)   18  Change in bowel habits (787 99) (R19 4)   19  Chronic kidney disease, stage 3 (585 3) (N18 3)   20  Chronic low back pain (724 2,338 29) (M54 5,G89 29)   21  Constipation (564 00) (K59 00)   22  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)   23  Cough (786 2) (R05)   24  Current use of insulin (V58 67) (Z79 4)   25  Diabetes mellitus type II, controlled (250 00) (E11 9)   26  Diabetic eye exam (V72 0,250 00) (E11 9,Z01 00)   27  Diabetic neuropathy (250 60,357 2) (E11 40)   28  Diarrhea (787 91) (R19 7)   29  Dizziness (780 4) (R42)   30  Dysphagia (787 20) (R13 10)   31  Dyspnea (786 09) (R06 00)   32  Encounter for diabetic foot exam (250 00) (E11 9)   33  Exposure to pneumonia (V01 79) (Z20 828)   34  Fatigue (780 79) (R53 83)   35  GERD (gastroesophageal reflux disease) (530 81) (K21 9)   36  Hematuria (599 70) (R31 9)   37  Hyperlipidemia (272 4) (E78 5)   38  Lung nodule (793 11) (R91 1)   39  Memory difficulties (780 93) (R41 3)   40  Nausea (787 02) (R11 0)   41  Nausea (787 02) (R11 0)   42  Nausea and vomiting (787 01) (R11 2)   43  Need for influenza vaccination (V04 81) (Z23)   44  Neuropathic pain (729 2) (M79 2)   45  Opiate use (305 50) (F11 90)   46  Ovarian cyst (620 2) (N83 209)   47  Pancreatitis (577 0) (K85 90)   48  Peripheral neuropathy (356 9) (G62 9)   49  Plantar wart, left foot (078 12) (B07 0)   50  Post traumatic stress disorder (309 81) (F43 10)   51  Preop examination (V72 84) (Z01 818)   52  Proteinuria (791 0) (R80 9)   53  Saddle anesthesia (782 0) (R20 0)   54  Sciatica, right side (724 3) (M54 31)   55  Sleep disturbance (780 50) (G47 9)   56  Therapeutic opioid induced constipation (564 09,E935 2) (K59 03,T40 2X5A)   57  Tooth pain (525 9) (K08 89)   58  Urinary frequency (788 41) (R35 0)   59  Urinary incontinence (788 30) (R32)   60   Urinary tract infection (599 0) (N39 0)   61  Weakness of both lower extremities (729 89) (R29 898)   62  Weakness of right side of body (728 87) (R53 1)   63  Wegener's granulomatosis (446 4) (M31 30)   64  Wheezing (786 07) (R06 2)    Surgical History    1  History of Release Of Hand Scar Contracture Extensor With Skin Grafts    Family History  Mother    1  Denied: Family history of Drug abuse   2  No family history of mental disorder  Father    3  Denied: Family history of Drug abuse   4  No family history of mental disorder    Social History    · Denied: Alcohol use (V49 89) (Z78 9)   · Daily caffeine consumption, 1 serving a day   · Denied: Drug use (305 90) (F19 90)   · Former smoker (V15 82) (Z87 891)   · No drug use   · No preference on Bahai beliefs   · Social history reviewed, unchanged   · Uses marijuana (305 20) (F12 90)  Social History Reviewed: The social history was reviewed and updated today  The social history was reviewed and is unchanged  Current Meds   1  Amitiza 24 MCG Oral Capsule; TAKE 1 CAPSULE TWICE DAILY WITH FOOD; Therapy: 48BPY9684 to (PGAAQCNM:32BMA0140)  Requested for: 35AQQ9143; Last   Rx:83Kwh9353 Ordered   2  Amphetamine-Dextroamphet ER 20 MG Oral Capsule Extended Release 24 Hour; Take   1 capsule twice daily; Therapy: 03UWG3294 to (NKWVWWNC:02YGF7750); Last Rx:48Rrw6689 Ordered   3  Carafate 1 GM/10ML Oral Suspension; TAKE 10 ML 3 TIMES DAILY; Therapy: 90AZB8105 to (Evaluate:61Pqk3571)  Requested for: 61HVM3796; Last   Rx:92Gbm6367 Ordered   4  FreeStyle Lite Test In Vitro Strip; TEST 3 TIMES DAILY; Therapy: 80Bor2282 to (Mirella Garner)  Requested for: 65UJW0870; Last   Rx:07Fte4436 Ordered   5  Gabapentin 300 MG Oral Capsule; TAKE 1 CAPSULE 3 TIMES DAILY  Requested for:   67WEC6634; Last Rx:09Nov2017 Ordered   6  LevoFLOXacin 250 MG Oral Tablet; Take 2 tablets today, then 1 tablet the next day, and   then 1 tablet every 48 hours until gone;    Therapy: 97ATO2405 to (Evaluate:89Aya0957)  Requested for: 93HAV2103; Last   Rx:28Nov2017 Ordered   7  Lisinopril 2 5 MG Oral Tablet; TAKE 1 TABLET DAILY; Therapy: 44Uoa3563 to (Evaluate:87Ceg5001)  Requested for: 73Okp9331; Last   Rx:14Ofi7669 Ordered   8  Methocarbamol 500 MG Oral Tablet; TAKE 1 TABLET 3 TIMES DAILY; Therapy: 27ICV8117 to (Evaluate:80Fck1256)  Requested for: 69Cnx9436; Last   Rx:49Uqx4380 Ordered   9  Oxycodone-Acetaminophen 7 5-325 MG Oral Tablet; TAKE 1 TABLET EVERY 6 HOURS   AS NEEDED FOR PAIN;   Therapy: 12HCW4345 to (Evaluate:04Jan2018); Last Rx:45Tou8871 Ordered   10  Promethazine HCl - 12 5 MG Oral Tablet; TAKE 1 TABLET EVERY 6 HOURS AS NEEDED    FOR NAUSEA; Therapy: 40YLG3085 to [phone])  Requested for: 18WPR4959; Last    Rx:85Sbu8333 Ordered   11  QUEtiapine Fumarate 100 MG Oral Tablet; TAKE 1 TABLET AT BEDTIME; Therapy: 20TMH0683 to (Evaluate:91Jvx2940)  Requested for: 99EOE9983; Last    Rx:28Nov2017 Ordered   12  Relistor 150 MG Oral Tablet; TAKE 3 TABLET Daily; Therapy: 79TKG1248 to (RYDMLKCW:38SYG3231)  Requested for: 94ZQH7826; Last    Rx:18Fea0298 Ordered   13  TiZANidine HCl - 4 MG Oral Tablet; TAKE 1 TABLET 3 TIMES DAILY AS NEEDED; Therapy: 12HWF7544 to [phone])  Requested for: 13EKH0415; Last    Rx:94Fij4519 Ordered  Medication List Reviewed: The medication list was reviewed and updated today  Allergies    1  Bactrim TABS   2  Ibuprofen TABS   3  Lexapro TABS   4  Navane CAPS   5  PROzac TABS    Vitals  Signs   Recorded: 44FZT5243 12:56PM   Temperature: 98 F, Tympanic  Heart Rate: 111  Respiration: 20  Systolic: 251, LUE, Sitting  Diastolic: 68, LUE, Sitting  Height: 5 ft 1 in  Weight: 202 lb 4 oz  BMI Calculated: 38 22  BSA Calculated: 1 9  O2 Saturation: 95    Physical Exam    Constitutional   General appearance: Abnormal   acutely ill  Pulmonary   Respiratory effort: No increased work of breathing or signs of respiratory distress      Auscultation of lungs: Clear to auscultation  Cardiovascular   Auscultation of heart: Abnormal   The heart rate was tachycardic  The rhythm was regular  Abdomen   Abdomen: Abnormal   The abdomen was flat  Bowel sounds were normal  There was moderate tenderness in the epigastric area  Liver and spleen: No hepatomegaly or splenomegaly  Message   Recorded as Task   Date: 12/25/2017 05:41 PM, Created By: System   Task Name: Hospital ANCA   Assigned To: RACHAEL BYRD,CLINICAL TEAM   Regarding Patient: Beth Braun, Status: Active   Comment:    System - 25 Dec 2017 5:41 PM     Patient discharged from hospital   Patient Name: Francisco Mejia  Patient YOB: 1970  Discharge Date: 12/25/2017  Facility: [facility] - 26 Dec 2017 9:15 AM     TASK REASSIGNED: Previously Assigned To Evi Mary - 26 Dec 2017 1:15 PM     TASK REASSIGNED: Previously Assigned To RACHAEL LEBLANCGLORIA,CLINICAL TEAM  I contact pt and schedule her rinku for tomorrow 12/27 3:40pm with Timmy Ozuna       Future Appointments    Date/Time Provider Specialty Site   01/29/2018 09:30 AM [facility] FP, Podiatry  [facility]   01/18/2018 04:30 PM Agustina Pinedo, DO Nephrology [address]     Signatures   Electronically signed by : Moisés Leal, ; Dec 26 2017  1:17PM EST                       (Author)    Electronically signed by : MARIANELA Marquez; Jan 5 2018  2:36PM EST                       (Author)    Electronically signed by : PHANI Araujo ; Jan 5 2018  2:59PM EST

## 2018-01-24 ENCOUNTER — TELEPHONE (OUTPATIENT)
Dept: FAMILY MEDICINE CLINIC | Facility: CLINIC | Age: 48
End: 2018-01-24

## 2018-01-24 RX ORDER — ATORVASTATIN CALCIUM 80 MG/1
1 TABLET, FILM COATED ORAL DAILY
COMMUNITY
Start: 2016-05-20 | End: 2018-02-01 | Stop reason: SDUPTHER

## 2018-01-24 RX ORDER — OXYCODONE AND ACETAMINOPHEN 7.5; 325 MG/1; MG/1
1 TABLET ORAL EVERY 6 HOURS PRN
COMMUNITY
Start: 2016-05-04 | End: 2018-01-25 | Stop reason: SDUPTHER

## 2018-01-24 RX ORDER — DEXTROAMPHETAMINE SACCHARATE, AMPHETAMINE ASPARTATE MONOHYDRATE, DEXTROAMPHETAMINE SULFATE AND AMPHETAMINE SULFATE 5; 5; 5; 5 MG/1; MG/1; MG/1; MG/1
1 CAPSULE, EXTENDED RELEASE ORAL 2 TIMES DAILY
COMMUNITY
Start: 2016-06-02 | End: 2018-01-25 | Stop reason: SDUPTHER

## 2018-01-24 RX ORDER — SUCRALFATE ORAL 1 G/10ML
10 SUSPENSION ORAL 3 TIMES DAILY
COMMUNITY
Start: 2017-09-22 | End: 2018-07-11 | Stop reason: SDUPTHER

## 2018-01-24 RX ORDER — METOPROLOL SUCCINATE 25 MG/1
1 TABLET, EXTENDED RELEASE ORAL DAILY
Status: ON HOLD | COMMUNITY
Start: 2015-11-04 | End: 2018-12-14

## 2018-01-24 RX ORDER — PEN NEEDLE, DIABETIC 30 GX3/16"
1 NEEDLE, DISPOSABLE MISCELLANEOUS
COMMUNITY
Start: 2016-07-19 | End: 2018-03-28 | Stop reason: SDUPTHER

## 2018-01-24 RX ORDER — GABAPENTIN 300 MG/1
1 CAPSULE ORAL 3 TIMES DAILY
COMMUNITY
End: 2018-04-13 | Stop reason: SDUPTHER

## 2018-01-24 RX ORDER — LISINOPRIL 2.5 MG/1
1 TABLET ORAL DAILY
COMMUNITY
Start: 2017-08-02 | End: 2018-02-01 | Stop reason: SDUPTHER

## 2018-01-24 RX ORDER — LANCETS 28 GAUGE
3 EACH MISCELLANEOUS 3 TIMES DAILY
COMMUNITY
Start: 2017-04-28 | End: 2018-02-01 | Stop reason: SDUPTHER

## 2018-01-24 RX ORDER — QUETIAPINE FUMARATE 100 MG/1
1 TABLET, FILM COATED ORAL
COMMUNITY
Start: 2017-11-28 | End: 2018-02-01 | Stop reason: SDUPTHER

## 2018-01-24 RX ORDER — METHOCARBAMOL 500 MG/1
1 TABLET, FILM COATED ORAL 3 TIMES DAILY
COMMUNITY
Start: 2017-05-24 | End: 2018-03-19

## 2018-01-24 NOTE — TELEPHONE ENCOUNTER
Pt calling for her med refills  She needs the Oxycodone 7 5-325mg and the Adderrall 20mg  She has an apt here to see podiatry on Monday and would like to pick those up also

## 2018-01-25 DIAGNOSIS — F31.9 BIPOLAR 1 DISORDER (HCC): ICD-10-CM

## 2018-01-25 DIAGNOSIS — M31.30 WEGENER'S GRANULOMATOSIS: Primary | Chronic | ICD-10-CM

## 2018-01-25 DIAGNOSIS — M31.30 WEGENER'S GRANULOMATOSIS: Chronic | ICD-10-CM

## 2018-01-25 DIAGNOSIS — G89.4 CHRONIC PAIN SYNDROME: ICD-10-CM

## 2018-01-25 RX ORDER — DEXTROAMPHETAMINE SACCHARATE, AMPHETAMINE ASPARTATE MONOHYDRATE, DEXTROAMPHETAMINE SULFATE AND AMPHETAMINE SULFATE 5; 5; 5; 5 MG/1; MG/1; MG/1; MG/1
20 CAPSULE, EXTENDED RELEASE ORAL 2 TIMES DAILY
Qty: 60 CAPSULE | Refills: 0 | Status: SHIPPED | OUTPATIENT
Start: 2018-01-25 | End: 2018-01-25 | Stop reason: SDUPTHER

## 2018-01-25 RX ORDER — OXYCODONE AND ACETAMINOPHEN 7.5; 325 MG/1; MG/1
1 TABLET ORAL EVERY 6 HOURS PRN
Qty: 120 TABLET | Refills: 0 | Status: SHIPPED | OUTPATIENT
Start: 2018-01-25 | End: 2018-01-25 | Stop reason: SDUPTHER

## 2018-01-25 RX ORDER — DEXTROAMPHETAMINE SACCHARATE, AMPHETAMINE ASPARTATE MONOHYDRATE, DEXTROAMPHETAMINE SULFATE AND AMPHETAMINE SULFATE 5; 5; 5; 5 MG/1; MG/1; MG/1; MG/1
20 CAPSULE, EXTENDED RELEASE ORAL 2 TIMES DAILY
Qty: 60 CAPSULE | Refills: 0 | Status: SHIPPED | OUTPATIENT
Start: 2018-01-25 | End: 2018-03-14 | Stop reason: SDUPTHER

## 2018-01-25 RX ORDER — OXYCODONE AND ACETAMINOPHEN 7.5; 325 MG/1; MG/1
1 TABLET ORAL EVERY 6 HOURS PRN
Qty: 120 TABLET | Refills: 0 | Status: SHIPPED | OUTPATIENT
Start: 2018-01-25 | End: 2018-02-27 | Stop reason: SDUPTHER

## 2018-01-29 ENCOUNTER — OFFICE VISIT (OUTPATIENT)
Dept: FAMILY MEDICINE CLINIC | Facility: CLINIC | Age: 48
End: 2018-01-29
Payer: COMMERCIAL

## 2018-01-29 VITALS
DIASTOLIC BLOOD PRESSURE: 80 MMHG | WEIGHT: 201 LBS | TEMPERATURE: 97.3 F | BODY MASS INDEX: 36.99 KG/M2 | HEIGHT: 62 IN | RESPIRATION RATE: 20 BRPM | HEART RATE: 88 BPM | OXYGEN SATURATION: 98 % | SYSTOLIC BLOOD PRESSURE: 130 MMHG

## 2018-01-29 DIAGNOSIS — L84 CALLUS: Primary | ICD-10-CM

## 2018-01-29 PROCEDURE — 99203 OFFICE O/P NEW LOW 30 MIN: CPT | Performed by: PODIATRIST

## 2018-01-29 NOTE — PROGRESS NOTES
Routine Foot Care- Podiatry   Jeanne Melchor 52 y o  female MRN: [id number]  Encounter: [id number]    Assessment/Plan     Assessment:  1  Right heel plantar verrucae   1  Right 1st medial MPJ callus  2  DM    Plan:  - Patient was seen/examined  All questions and concerns addressed  - after verbal consent was obtained, the right heel plantar verruca was sharply debrided down to pinpoint bleeding with a 15 blade  The medial plantar verruca was then treated with cryotherapy   -callus were sharply trimmed to normal epithelium with a 15 blade without incident   -patient was instructed to use a pumice stone at home to reduce the growth of the callus  -patient was instructed to use OTC lotion to their feet  - Stressed the importance of glycemic control, shoe gear, and proper diabetic foot care  - RTC in 2 weeks       History of Present Illness     HPI:  Jeanne Melchor is a 52 y o  female who presents with right foot heel, and 1st MPJ callus  She states that the calluses have been present for a while  She states that she shaves the calluses at home, but they have become too painful, and hard to shave  She states that the 1st IPJ callus does not hurt, but the heel callus causes her pain when ambulating  She states that she uses over-the-counter lotion for her calluses  The patient denies any nausea, vomiting, fever, chills, shortness of breath, or chest pains  Consults  Review of Systems   Constitutional: Negative  HENT: Negative  Eyes: Negative  Respiratory: Negative  Cardiovascular: Negative  Gastrointestinal: Negative  Musculoskeletal: Negative   Skin:  Hyperkeratotic lesions on the right foot  Neurological: Negative          Historical Information   Past Medical History:   Diagnosis Date    Anemia of chronic disease     Anxiety     Asthma     Asthma     Chronic abdominal pain     CKD (chronic kidney disease) stage 3, GFR 30-59 ml/min     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[address] )     Cushing syndrome ([facility] [address] )  Diabetes mellitus ([facility] )     DVT (deep venous thrombosis) ([place] [address] )     History of acute pancreatitis     felt secondary to Bactrim    HTN (hypertension)     Hyperlipidemia     Hypertension     Microscopic polyangiitis (HCC)     MPA (microscopic polyangiitis) (HCC)     Renal disorder     Self-inflicted injury     self inflicted skin wounds    Wegener's granulomatosis with renal involvement ([place] [address] ) 2015     Past Surgical History:   Procedure Laterality Date    ESOPHAGOGASTRODUODENOSCOPY  09/11/2015    mild antral gastritis    RELEASE SCAR CONTRACTURE / GRAFT REPAIRS OF HAND       Social History   History   Alcohol Use No     History   Drug Use    Types: Marijuana     History   Smoking Status    Former Smoker    Types: Cigarettes    Quit date: 2010   Smokeless Tobacco    Never Used     Family History:   Family History   Problem Relation Age of Onset    Colon cancer Neg Hx     Drug abuse Neg Hx      mother father    Mental illness Neg Hx      disorder, mother father       Meds/Allergies     (Not in a hospital admission)  Allergies   Allergen Reactions    Bactrim [Sulfamethoxazole-Trimethoprim]      Pt "They think that is what cause the pancreatitis"     Haldol [Haloperidol] Other (See Comments)     "I don't like it"    Ibuprofen     Navane [Thiothixene]      SI    Other      "novaine?" antipsychotic    Prozac [Fluoxetine Hcl]      SI    Lexapro [Escitalopram Oxalate] Rash       Objective   First Vitals:   @VSFIRST2(5,8,6,7,9,11,14,10:FIRST)@    Current Vitals:   Blood Pressure: 130/80 (01/29/18 0952)  Pulse: 88 (01/29/18 0952)  Temperature: (!) 97 3 °F (36 3 °C) (01/29/18 0952)  Temp Source: Tympanic (01/29/18 0952)  Respirations: 20 (01/29/18 0952)  Height: 5' 2" (157 5 cm) (01/29/18 4227)  Weight - Scale: 91 2 kg (201 lb) (01/29/18 0952)  SpO2: 98 % (01/29/18 0952)        /80 (BP Location: Right arm, Patient Position: Sitting, Cuff Size: Large)   Pulse 88   Temp (!) 97 3 °F (36 3 °C) (Tympanic)   Resp 20   Ht 5' 2" (1 575 m)   Wt 91 2 kg (201 lb)   LMP  (LMP Unknown)   SpO2 98%   BMI 36 76 kg/m²     General Appearance:    Alert, cooperative, no distress   Head:    Normocephalic, without obvious abnormality, atraumatic   Eyes:    PERRL, conjunctiva/corneas clear, EOM's intact            Nose:   Moist mucous membranes, no drainage or sinus tenderness   Throat:   No tenderness, no exudates   Neck:   Supple, symmetrical, trachea midline, no JVD   Back:     Symmetric, no CVA tenderness   Lungs:     Clear to auscultation bilaterally, respirations unlabored   Chest wall:    No tenderness or deformity   Heart:    Regular rate and rhythm, S1 and S2 normal, no murmur, rub   or gallop   Abdomen:     Soft, non-tender, bowel sounds active all four quadrants,     no masses, no organomegaly           Extremities:   MMT is 5/5 to all compartments of the RLE, and 3/5 on the LLE, +0/4 edema B/L, Digital ROM is intact, pain on palpation noted to the right plantar medial heel  Pulses:   R DP is +2/4, R PT is +2/4, L DP is +2/4, L PT is +2/4, CFT< 3sec to all digits   Skin: Hyperkeratotic lesions noted on the right 1st medial IPJ  Papular lesions noted on the right medial plantar heel, with pinpoint bleeding  No open Lesions  Skin of the LE is normal texture, turgor  Neurologic:   CNII-XII intact  Normal strength, sensation and reflexes       Throughout  Gross sensation is intact   Protective sensation is Intact

## 2018-02-01 ENCOUNTER — OFFICE VISIT (OUTPATIENT)
Dept: FAMILY MEDICINE CLINIC | Facility: CLINIC | Age: 48
End: 2018-02-01
Payer: COMMERCIAL

## 2018-02-01 VITALS
HEART RATE: 111 BPM | DIASTOLIC BLOOD PRESSURE: 84 MMHG | WEIGHT: 208.4 LBS | HEIGHT: 62 IN | OXYGEN SATURATION: 97 % | BODY MASS INDEX: 38.35 KG/M2 | RESPIRATION RATE: 20 BRPM | SYSTOLIC BLOOD PRESSURE: 152 MMHG | TEMPERATURE: 97.2 F

## 2018-02-01 DIAGNOSIS — E78.5 DYSLIPIDEMIA: Chronic | ICD-10-CM

## 2018-02-01 DIAGNOSIS — F31.9 BIPOLAR 1 DISORDER (HCC): ICD-10-CM

## 2018-02-01 DIAGNOSIS — N28.89 HYPERTENSION SECONDARY TO OTHER RENAL DISORDERS: Chronic | ICD-10-CM

## 2018-02-01 DIAGNOSIS — M31.30 WEGENER'S GRANULOMATOSIS: Primary | Chronic | ICD-10-CM

## 2018-02-01 DIAGNOSIS — IMO0001 IDDM (INSULIN DEPENDENT DIABETES MELLITUS): Chronic | ICD-10-CM

## 2018-02-01 DIAGNOSIS — Z12.31 ENCOUNTER FOR SCREENING MAMMOGRAM FOR BREAST CANCER: ICD-10-CM

## 2018-02-01 DIAGNOSIS — K86.1 CHRONIC PANCREATITIS, UNSPECIFIED PANCREATITIS TYPE (HCC): ICD-10-CM

## 2018-02-01 DIAGNOSIS — G89.4 CHRONIC PAIN SYNDROME: ICD-10-CM

## 2018-02-01 DIAGNOSIS — I15.1 HYPERTENSION SECONDARY TO OTHER RENAL DISORDERS: Chronic | ICD-10-CM

## 2018-02-01 DIAGNOSIS — R10.13 EPIGASTRIC PAIN: ICD-10-CM

## 2018-02-01 PROCEDURE — 99214 OFFICE O/P EST MOD 30 MIN: CPT | Performed by: PHYSICIAN ASSISTANT

## 2018-02-01 RX ORDER — LANCETS 28 GAUGE
EACH MISCELLANEOUS 3 TIMES DAILY
Qty: 100 EACH | Refills: 1 | Status: SHIPPED | OUTPATIENT
Start: 2018-02-01 | End: 2018-04-04 | Stop reason: SDUPTHER

## 2018-02-01 NOTE — ASSESSMENT & PLAN NOTE
A1c in December 2017 was 6 3%  Advised patient to continue monitoring blood sugar daily  If blood sugar increases would recommend taking insulin once again  Goal is to keep fasting blood sugar below 140

## 2018-02-01 NOTE — PROGRESS NOTES
Assessment/Plan:    Epigastric pain  Currently on Zofran and Carafate as needed to help with the nausea  Has used marijuana in the past to help with symptoms  Chronic pain  Secondary to Wegener's granulomatosis which is currently being controlled with Percocet  HTN (hypertension)  Secondary due to kidney failure  Continue with metoprolol and following up with Nephrology  Wegener's granulomatosis ([place] [id number] )  Recommended to follow up with Rheumatology  IDDM (insulin dependent diabetes mellitus) ([place] [id number] )  A1c in December 2017 was 6 3%  Advised patient to continue monitoring blood sugar daily  If blood sugar increases would recommend taking insulin once again  Goal is to keep fasting blood sugar below 140  Pancreatitis  Unsure of possible cause of the pancreatitis, but being controlled with diet at this time  Dyslipidemia  Get lab work completed  Depending on results will make adjustments to atorvastatin  Diagnoses and all orders for this visit:    Wegener's granulomatosis ([place] [id number] )    Chronic pancreatitis, unspecified pancreatitis type ([place] [id number] )    IDDM (insulin dependent diabetes mellitus) ([place] [id number] )  -     insulin glargine (LANTUS SOLOSTAR) injection pen 100 units/mL; Inject 0 22 mL (22 Units total) under the skin daily at bedtime  -     insulin lispro (HUMALOG KWIKPEN) 100 Units/mL SOPN; Inject 8 Units under the skin 3 (three) times a day with meals  -     glucose blood (FREESTYLE LITE) test strip; 1 each by Other route 3 (three) times a day  -     Lancets (FREESTYLE) lancets; by Other route 3 (three) times a day    Dyslipidemia  -     Lipid panel; Future    Hypertension secondary to other renal disorders    Chronic pain syndrome    Epigastric pain    Encounter for screening mammogram for breast cancer  -     Mammo screening bilateral w cad; Future          Subjective:      Patient ID: Linda Vieira is a 52 y o  female      43-year-old female presenting feet refills on medications, and to discuss use of medical marijuana to help with nausea and chronic pains  Patient states that she has used marijuana in the past which has helped with her symptoms  Patient is trying to see a specialist will prescribed medical marijuana for her, but needs a letter stating her current medical conditions and which she is currently taking  Patient states that her epigastric pain has been improving  Has been eating a lot more fruits and vegetables  Has been trying to decrease fatty and greasy foods which exacerbates her symptoms  Also notices increase in symptoms with alcohol use  Will have nausea daily, but states it has been awhile since she has had any vomiting  Patient states that she has not been taking her insulin  Has been checking her blood sugar at home which she states around 100-120  A1c in December was 6 3%  Again patient has been adjusting her diet due to her pancreatitis and other GI symptoms  Patient is still looking for a psychiatrist   Is currently taking Adderall and Seroquel for ADHD and bipolar  No concerns today  Continuing to follow up with Nephrology for chronic kidney disease  Patient has been having episodes of chest pain  States that since she was last here she has had about 3 episodes  Describes as a sharp pain on left side of her chest that radiates up to her left shoulder  Symptoms will last no more than 5 minutes  When having the chest pain she denies any shortness of breath, headaches, dizziness, abdominal pain, weakness in extremities  In the past she has had similar chest pain with her acid reflux  Most recent episode she noticed occurred when she was having an argument with someone else  Does believe that her symptoms may also be related to her anxiety  Medication Refill   Associated symptoms include abdominal pain, arthralgias, myalgias, nausea, vomiting and weakness  Pertinent negatives include no chest pain, fatigue, headaches, numbness or rash         The following portions of the patient's history were reviewed and updated as appropriate:   She  has a past medical history of Anemia of chronic disease; Anxiety; Asthma; Asthma; Chronic abdominal pain; CKD (chronic kidney disease) stage 3, GFR 30-59 ml/min; 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[facility] ); Cushing syndrome ([facility] ); Diabetes mellitus ([facility] ); DVT (deep venous thrombosis) ([facility] ); History of acute pancreatitis; HTN (hypertension); Hyperlipidemia; Hypertension; Microscopic polyangiitis ([facility] ); MPA (microscopic polyangiitis) ([facility] ); Renal disorder; Self-inflicted injury; and Wegener's granulomatosis with renal involvement ([facility] ) (2015)  She  does not have any pertinent problems on file  Her family history is not on file  She  reports that she quit smoking about 8 years ago  Her smoking use included Cigarettes  She has never used smokeless tobacco  She reports that she uses drugs, including Marijuana  She reports that she does not drink alcohol    Current Outpatient Prescriptions   Medication Sig Dispense Refill    amphetamine-dextroamphetamine (ADDERALL XR) 20 MG 24 hr capsule Take 1 capsule by mouth 2 (two) times a day Max Daily Amount: 40 mg 60 capsule 0    atorvastatin (LIPITOR) 20 mg tablet Take 20 mg by mouth daily      gabapentin (NEURONTIN) 300 mg capsule Take 1 capsule by mouth 3 (three) times a day      glucose blood (FREESTYLE LITE) test strip 1 each by Other route 3 (three) times a day 100 each 5    metoprolol succinate (TOPROL-XL) 25 mg 24 hr tablet Take 1 tablet by mouth daily      oxyCODONE-acetaminophen (PERCOCET) 7 5-325 MG per tablet Take 1 tablet by mouth every 6 (six) hours as needed (pain) Max Daily Amount: 4 tablets 120 tablet 0    QUEtiapine (SEROquel) 50 mg tablet Take 50 mg by mouth daily at bedtime      cycloSPORINE (RESTASIS) 0 05 % ophthalmic emulsion Administer 1 drop to both eyes 2 (two) times a day      insulin glargine (LANTUS SOLOSTAR) injection pen 100 units/mL Inject 0 22 mL (22 Units total) under the skin daily at bedtime 5 pen 1    insulin lispro (HUMALOG KWIKPEN) 100 Units/mL SOPN Inject 8 Units under the skin 3 (three) times a day with meals 5 pen 1    Insulin Pen Needle (PEN NEEDLES) 31G X 8 MM MISC Inject 1 Stick under the skin 4 (four) times a day (with meals and at bedtime)      Lancets (FREESTYLE) lancets by Other route 3 (three) times a day 100 each 1    lubiprostone (AMITIZA) 24 mcg capsule Take 1 capsule by mouth Twice daily      methocarbamol (ROBAXIN) 500 mg tablet Take 1 tablet by mouth 3 (three) times a day      ondansetron (ZOFRAN) 4 mg tablet Take 4 mg by mouth every 8 (eight) hours as needed for nausea or vomiting      polyvinyl alcohol-povidone (REFRESH) 1 4-0 6 % ophthalmic solution 1-2 drops as needed for wound care      sucralfate (CARAFATE) 1 g/10 mL suspension Take 10 mL by mouth 3 (three) times a day       No current facility-administered medications for this visit  She is allergic to bactrim [sulfamethoxazole-trimethoprim]; haldol [haloperidol]; ibuprofen; navane [thiothixene]; other; prozac [fluoxetine hcl]; and lexapro [escitalopram oxalate]       Review of Systems   Constitutional: Negative for activity change, appetite change, fatigue and unexpected weight change  Eyes: Negative for pain and visual disturbance  Respiratory: Negative for chest tightness and shortness of breath  Cardiovascular: Negative for chest pain, palpitations and leg swelling  Gastrointestinal: Positive for abdominal pain, nausea and vomiting  Negative for diarrhea  Endocrine: Negative for polydipsia, polyphagia and polyuria  Genitourinary: Negative for frequency and urgency  Musculoskeletal: Positive for arthralgias, back pain, gait problem and myalgias  Skin: Negative for rash and wound  Neurological: Positive for weakness  Negative for dizziness, syncope, numbness and headaches  Psychiatric/Behavioral: Negative for behavioral problems, sleep disturbance and suicidal ideas  Objective:    Vitals:    02/01/18 0841   BP: 152/84   BP Location: Right arm   Patient Position: Sitting   Cuff Size: Large   Pulse: (!) 111   Resp: 20   Temp: (!) 97 2 °F (36 2 °C)   TempSrc: Tympanic   SpO2: 97%   Weight: 94 5 kg (208 lb 6 4 oz)   Height: 5' 2" (1 575 m)      Physical Exam   Constitutional: She is oriented to person, place, and time  She appears well-developed and well-nourished  No distress  Morbid obesity   Eyes: Conjunctivae and EOM are normal  Pupils are equal, round, and reactive to light  Cardiovascular: Normal rate, regular rhythm, normal heart sounds and normal pulses  Pulmonary/Chest: Effort normal and breath sounds normal  No tachypnea and no bradypnea  Abdominal: Soft  Normal appearance, normal aorta and bowel sounds are normal  She exhibits no distension  There is no hepatosplenomegaly  There is tenderness in the left upper quadrant and left lower quadrant  There is no CVA tenderness  Musculoskeletal: Normal range of motion  She exhibits no edema  Wearing bilateral knee braces  Strength 4/5 bilateral lower extremities  Neurological: She is alert and oriented to person, place, and time  She has normal reflexes  She is not disoriented  No cranial nerve deficit or sensory deficit  She exhibits normal muscle tone  Gait abnormal    Skin: Skin is warm and dry  Nursing note and vitals reviewed

## 2018-02-01 NOTE — ASSESSMENT & PLAN NOTE
Currently on Zofran and Carafate as needed to help with the nausea  Has used marijuana in the past to help with symptoms

## 2018-02-12 ENCOUNTER — OFFICE VISIT (OUTPATIENT)
Dept: FAMILY MEDICINE CLINIC | Facility: CLINIC | Age: 48
End: 2018-02-12
Payer: COMMERCIAL

## 2018-02-12 VITALS
HEIGHT: 62 IN | RESPIRATION RATE: 20 BRPM | TEMPERATURE: 97 F | OXYGEN SATURATION: 98 % | HEART RATE: 88 BPM | SYSTOLIC BLOOD PRESSURE: 110 MMHG | DIASTOLIC BLOOD PRESSURE: 70 MMHG | WEIGHT: 210 LBS | BODY MASS INDEX: 38.64 KG/M2

## 2018-02-12 DIAGNOSIS — B07.0 PLANTAR WART OF RIGHT FOOT: Primary | ICD-10-CM

## 2018-02-12 PROCEDURE — 99213 OFFICE O/P EST LOW 20 MIN: CPT | Performed by: PODIATRIST

## 2018-02-12 NOTE — PROGRESS NOTES
Podiatry Clinic Visit  Salvador Alanis 52 y o  female MRN: [id number]  Encounter: [id number]    Assessment/Plan     Assessment:  1  Right plantar heel wart  2  DM    Plan:  -Patient was seen/examined  All questions and concerns addressed  - after verbal consent was obtained, the right heel plantar verruca was sharply debrided down to pinpoint bleeding with a 10 blade  And cryotherapy was performed to the plantar heel wart  -patient was instructed to use OTC wart medication at home  - Stressed the importance of glycemic control, shoe gear, and proper diabetic foot care  - RTC in 3 weeks       History of Present Illness     HPI:  Salvador Alanis is a 52 y o  female who RTC for right plantar heel wart  Pt states that it feels much better with the treatment 2 weeks ago however states it is still painful  Pt has had the wart for multiple years and is aware that she will need many treatment sessions  The patient denies any nausea, vomiting, fever, chills, shortness of breath, or chest pains  Consults  Review of Systems   Constitutional: Negative  HENT: Negative  Eyes: Negative  Respiratory: Negative  Cardiovascular: Negative  Gastrointestinal: Negative  Musculoskeletal: Negative   Skin: wart  Neurological: Negative          Historical Information   Past Medical History:   Diagnosis Date    Anemia of chronic disease     Anxiety     Asthma     Asthma     Chronic abdominal pain     CKD (chronic kidney disease) stage 3, GFR 30-59 ml/min     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 )     Cushing syndrome [facility] )     Diabetes mellitus ([facility] )     DVT (deep venous thrombosis) ([facility] )     History of acute pancreatitis     felt secondary to Bactrim    HTN (hypertension)     Hyperlipidemia     Hypertension     Microscopic polyangiitis ([facility])     [facility]     Renal disorder     Self-inflicted injury     self inflicted skin wounds    Wegener's granulomatosis with renal involvement ([facility] ) 2015 Past Surgical History:   Procedure Laterality Date    ESOPHAGOGASTRODUODENOSCOPY  09/11/2015    mild antral gastritis    RELEASE SCAR CONTRACTURE / GRAFT REPAIRS OF HAND       Social History   History   Alcohol Use No     History   Drug Use    Types: Marijuana     History   Smoking Status    Former Smoker    Types: Cigarettes    Quit date: 2010   Smokeless Tobacco    Never Used     Family History:   Family History   Problem Relation Age of Onset    Colon cancer Neg Hx     Drug abuse Neg Hx      mother father    Mental illness Neg Hx      disorder, mother father       Meds/Allergies     (Not in a hospital admission)  Allergies   Allergen Reactions    Bactrim [Sulfamethoxazole-Trimethoprim]      Pt "They think that is what cause the pancreatitis"     Haldol [Haloperidol] Other (See Comments)     "I don't like it"    Ibuprofen     Navane [Thiothixene]      SI    Other      "novaine?" antipsychotic    Prozac [Fluoxetine Hcl]      SI    Lexapro [Escitalopram Oxalate] Rash       Objective     Current Vitals:   Blood Pressure: 110/70 (02/12/18 1026)  Pulse: 88 (02/12/18 1026)  Temperature: (!) 97 °F (36 1 °C) (02/12/18 1026)  Temp Source: Tympanic (02/12/18 1026)  Respirations: 20 (02/12/18 1026)  Height: 5' 2" (157 5 cm) (02/12/18 1026)  Weight - Scale: 95 3 kg (210 lb) (02/12/18 1026)  SpO2: 98 % (02/12/18 1026)        /70 (BP Location: Right arm, Patient Position: Sitting, Cuff Size: Standard)   Pulse 88   Temp (!) 97 °F (36 1 °C) (Tympanic)   Resp 20   Ht 5' 2" (1 575 m)   Wt 95 3 kg (210 lb)   LMP  (LMP Unknown)   SpO2 98%   BMI 38 41 kg/m²     General Appearance:    Alert, cooperative, no distress   Head:    Normocephalic, without obvious abnormality, atraumatic   Eyes:    PERRL, conjunctiva/corneas clear, EOM's intact            Nose:   Moist mucous membranes, no drainage or sinus tenderness   Throat:   No tenderness, no exudates   Neck:   Supple, symmetrical, trachea midline, no JVD   Back:     Symmetric, no CVA tenderness   Lungs:     Clear to auscultation bilaterally, respirations unlabored   Chest wall:    No tenderness or deformity   Heart:    Regular rate and rhythm, S1 and S2 normal, no murmur, rub   or gallop   Abdomen:     Soft, non-tender, bowel sounds active all four quadrants,     no masses, no organomegaly           Extremities:   MMT is 5/5 to all compartments of the RLE, 3/5 to LLE, +0/4 edema B/L, Digital ROM is intact,    Pulses:   R DP is +2/4, R PT is +2/4, L DP is +2/4, L PT is +2/4, CFT< 3sec to all digits  Pedal hair is Present   Skin:   Verrucal Lesion noted on plantar lateral right heel with pinpoint bleeding s/p debridement  Lesion is painful with lateral compression  No open lesions, no clinical signs of bacterial infection, No maceration in interspaces       Neurologic:   CNII-XII intact  Normal strength, sensation and reflexes       Throughout  Gross sensation is intact   Protective sensation is Intact

## 2018-02-13 ENCOUNTER — TELEPHONE (OUTPATIENT)
Dept: FAMILY MEDICINE CLINIC | Facility: CLINIC | Age: 48
End: 2018-02-13

## 2018-02-15 ENCOUNTER — TELEPHONE (OUTPATIENT)
Dept: FAMILY MEDICINE CLINIC | Facility: CLINIC | Age: 48
End: 2018-02-15

## 2018-02-20 ENCOUNTER — HOSPITAL ENCOUNTER (EMERGENCY)
Facility: HOSPITAL | Age: 48
Discharge: HOME | End: 2018-02-20
Attending: EMERGENCY MEDICINE | Admitting: EMERGENCY MEDICINE
Payer: COMMERCIAL

## 2018-02-20 ENCOUNTER — APPOINTMENT (EMERGENCY)
Dept: CT IMAGING | Facility: HOSPITAL | Age: 48
End: 2018-02-20
Payer: COMMERCIAL

## 2018-02-20 VITALS
TEMPERATURE: 97.4 F | SYSTOLIC BLOOD PRESSURE: 136 MMHG | WEIGHT: 200 LBS | RESPIRATION RATE: 18 BRPM | DIASTOLIC BLOOD PRESSURE: 74 MMHG | BODY MASS INDEX: 36.58 KG/M2 | HEART RATE: 74 BPM | OXYGEN SATURATION: 99 %

## 2018-02-20 DIAGNOSIS — M54.50 LOW BACK PAIN: Primary | ICD-10-CM

## 2018-02-20 LAB
ALBUMIN SERPL BCP-MCNC: 3.8 G/DL (ref 3.5–5)
ALP SERPL-CCNC: 128 U/L (ref 46–116)
ALT SERPL W P-5'-P-CCNC: 26 U/L (ref 12–78)
ANION GAP SERPL CALCULATED.3IONS-SCNC: 9 MMOL/L (ref 4–13)
AST SERPL W P-5'-P-CCNC: 41 U/L (ref 5–45)
BACTERIA UR QL AUTO: ABNORMAL /HPF
BASOPHILS # BLD AUTO: 0.01 THOUSANDS/ΜL (ref 0–0.1)
BASOPHILS NFR BLD AUTO: 0 % (ref 0–1)
BILIRUB SERPL-MCNC: 0.29 MG/DL (ref 0.2–1)
BILIRUB UR QL STRIP: NEGATIVE
BUN SERPL-MCNC: 30 MG/DL (ref 5–25)
CALCIUM SERPL-MCNC: 9.5 MG/DL (ref 8.3–10.1)
CHLORIDE SERPL-SCNC: 107 MMOL/L (ref 100–108)
CLARITY UR: CLEAR
CO2 SERPL-SCNC: 26 MMOL/L (ref 21–32)
COLOR UR: YELLOW
COLOR, POC: YELLOW
CREAT SERPL-MCNC: 1.96 MG/DL (ref 0.6–1.3)
EOSINOPHIL # BLD AUTO: 0.06 THOUSAND/ΜL (ref 0–0.61)
EOSINOPHIL NFR BLD AUTO: 1 % (ref 0–6)
ERYTHROCYTE [DISTWIDTH] IN BLOOD BY AUTOMATED COUNT: 13.6 % (ref 11.6–15.1)
GFR SERPL CREATININE-BSD FRML MDRD: 30 ML/MIN/1.73SQ M
GLUCOSE SERPL-MCNC: 110 MG/DL (ref 65–140)
GLUCOSE UR STRIP-MCNC: NEGATIVE MG/DL
HCT VFR BLD AUTO: 38 % (ref 34.8–46.1)
HGB BLD-MCNC: 12.7 G/DL (ref 11.5–15.4)
HGB UR QL STRIP.AUTO: ABNORMAL
KETONES UR STRIP-MCNC: NEGATIVE MG/DL
LEUKOCYTE ESTERASE UR QL STRIP: ABNORMAL
LIPASE SERPL-CCNC: 743 U/L (ref 73–393)
LYMPHOCYTES # BLD AUTO: 1.38 THOUSANDS/ΜL (ref 0.6–4.47)
LYMPHOCYTES NFR BLD AUTO: 25 % (ref 14–44)
MCH RBC QN AUTO: 31 PG (ref 26.8–34.3)
MCHC RBC AUTO-ENTMCNC: 33.4 G/DL (ref 31.4–37.4)
MCV RBC AUTO: 93 FL (ref 82–98)
MONOCYTES # BLD AUTO: 0.45 THOUSAND/ΜL (ref 0.17–1.22)
MONOCYTES NFR BLD AUTO: 8 % (ref 4–12)
NEUTROPHILS # BLD AUTO: 3.68 THOUSANDS/ΜL (ref 1.85–7.62)
NEUTS SEG NFR BLD AUTO: 66 % (ref 43–75)
NITRITE UR QL STRIP: NEGATIVE
NON-SQ EPI CELLS URNS QL MICRO: ABNORMAL /HPF
NRBC BLD AUTO-RTO: 0 /100 WBCS
OTHER STN SPEC: ABNORMAL
PH UR STRIP.AUTO: 5.5 [PH] (ref 4.5–8)
PLATELET # BLD AUTO: 252 THOUSANDS/UL (ref 149–390)
PMV BLD AUTO: 10.3 FL (ref 8.9–12.7)
POTASSIUM SERPL-SCNC: 5.5 MMOL/L (ref 3.5–5.3)
PROT SERPL-MCNC: 8.3 G/DL (ref 6.4–8.2)
PROT UR STRIP-MCNC: >=300 MG/DL
RBC # BLD AUTO: 4.1 MILLION/UL (ref 3.81–5.12)
RBC #/AREA URNS AUTO: ABNORMAL /HPF
SODIUM SERPL-SCNC: 142 MMOL/L (ref 136–145)
SP GR UR STRIP.AUTO: >=1.03 (ref 1–1.03)
UROBILINOGEN UR QL STRIP.AUTO: 0.2 E.U./DL
WBC # BLD AUTO: 5.58 THOUSAND/UL (ref 4.31–10.16)
WBC #/AREA URNS AUTO: ABNORMAL /HPF

## 2018-02-20 PROCEDURE — 81001 URINALYSIS AUTO W/SCOPE: CPT

## 2018-02-20 PROCEDURE — 96374 THER/PROPH/DIAG INJ IV PUSH: CPT

## 2018-02-20 PROCEDURE — 85025 COMPLETE CBC W/AUTO DIFF WBC: CPT | Performed by: EMERGENCY MEDICINE

## 2018-02-20 PROCEDURE — 74176 CT ABD & PELVIS W/O CONTRAST: CPT

## 2018-02-20 PROCEDURE — 80053 COMPREHEN METABOLIC PANEL: CPT | Performed by: EMERGENCY MEDICINE

## 2018-02-20 PROCEDURE — 99284 EMERGENCY DEPT VISIT MOD MDM: CPT

## 2018-02-20 PROCEDURE — 96376 TX/PRO/DX INJ SAME DRUG ADON: CPT

## 2018-02-20 PROCEDURE — 96361 HYDRATE IV INFUSION ADD-ON: CPT

## 2018-02-20 PROCEDURE — 81002 URINALYSIS NONAUTO W/O SCOPE: CPT | Performed by: EMERGENCY MEDICINE

## 2018-02-20 PROCEDURE — 36415 COLL VENOUS BLD VENIPUNCTURE: CPT | Performed by: EMERGENCY MEDICINE

## 2018-02-20 PROCEDURE — 83690 ASSAY OF LIPASE: CPT | Performed by: EMERGENCY MEDICINE

## 2018-02-20 PROCEDURE — 96375 TX/PRO/DX INJ NEW DRUG ADDON: CPT

## 2018-02-20 RX ORDER — MORPHINE SULFATE 4 MG/ML
4 INJECTION, SOLUTION INTRAMUSCULAR; INTRAVENOUS ONCE
Status: COMPLETED | OUTPATIENT
Start: 2018-02-20 | End: 2018-02-20

## 2018-02-20 RX ORDER — ONDANSETRON 2 MG/ML
4 INJECTION INTRAMUSCULAR; INTRAVENOUS ONCE
Status: COMPLETED | OUTPATIENT
Start: 2018-02-20 | End: 2018-02-20

## 2018-02-20 RX ADMIN — MORPHINE SULFATE 4 MG: 4 INJECTION, SOLUTION INTRAMUSCULAR; INTRAVENOUS at 13:07

## 2018-02-20 RX ADMIN — MORPHINE SULFATE 4 MG: 4 INJECTION, SOLUTION INTRAMUSCULAR; INTRAVENOUS at 09:38

## 2018-02-20 RX ADMIN — ONDANSETRON 4 MG: 2 INJECTION INTRAMUSCULAR; INTRAVENOUS at 09:38

## 2018-02-20 RX ADMIN — SODIUM CHLORIDE 1000 ML: 0.9 INJECTION, SOLUTION INTRAVENOUS at 09:38

## 2018-02-20 RX ADMIN — SODIUM CHLORIDE 1000 ML: 0.9 INJECTION, SOLUTION INTRAVENOUS at 11:51

## 2018-02-20 NOTE — DISCHARGE INSTRUCTIONS
Pancreatitis   WHAT YOU NEED TO KNOW:   Pancreatitis is inflammation of your pancreas  The pancreas is an organ that makes insulin  It also makes enzymes (digestive juices) that help your body digest food  Pancreatitis may be an acute (short-term) problem that happens only once  It may become a chronic (long-term) problem that comes and goes over time  DISCHARGE INSTRUCTIONS:   Return to the emergency department if:   · You have severe pain in your abdomen and you are vomiting  Contact your healthcare provider if:   · You have a fever  · You continue to lose weight  · Your skin or the whites of your eyes turn yellow  · You have questions or concerns about your condition or care  Medicines: You may need any of the following:  · Antibiotics  treat a bacterial infection  · Prescription pain medicine  may be given  Ask your healthcare provider how to take this medicine safely  Some prescription pain medicines contain acetaminophen  Do not take other medicines that contain acetaminophen without talking to your healthcare provider  Too much acetaminophen may cause liver damage  Prescription pain medicine may cause constipation  Ask your healthcare provider how to prevent or treat constipation  · Take your medicine as directed  Contact your healthcare provider if you think your medicine is not helping or if you have side effects  Tell him or her if you are allergic to any medicine  Keep a list of the medicines, vitamins, and herbs you take  Include the amounts, and when and why you take them  Bring the list or the pill bottles to follow-up visits  Carry your medicine list with you in case of an emergency  Self-care:   · Rest  when you feel it is needed  Slowly start to do more each day  Return to your usual activities as directed  · Do not drink any alcohol    If you need help to stop drinking, contact the following organization:   ¨ Alcoholics Anonymous  Web Address: http://www [employer]/      · Ask your healthcare provider or dietitian about the best foods to eat  You may need to eat foods that are low in fat if you have chronic pancreatitis  Follow up with your healthcare provider as directed:  Write down your questions so you remember to ask them during your visits  © 2017 [address] Information is for End User's use only and may not be sold, redistributed or otherwise used for commercial purposes  All illustrations and images included in CareNotes® are the copyrighted property of [employer]  or [address]  The above information is an  only  It is not intended as medical advice for individual conditions or treatments  Talk to your doctor, nurse or pharmacist before following any medical regimen to see if it is safe and effective for you  Acute Low Back Pain, Ambulatory Care   GENERAL INFORMATION:   Acute low back pain  is discomfort in your lower back area that lasts for less than 12 weeks  The word acute is used to describe pain that starts suddenly, worsens quickly, and lasts for a short time    Common symptoms include the following:   · Back stiffness or spasms    · Pain down the back or side of one leg    · Holding yourself in an unusual position or posture to decrease your back pain    · Not being able to find a sitting position that is comfortable    · Slow increase in your pain for 24 to 48 hours after you stress your back    · Tenderness on your lower back or severe pain when you move your back  Seek immediate care for the following symptoms:   · Severe pain    · Sudden stiffness and heaviness in both buttocks down to both legs    · Numbness or weakness in one leg, or pain in both legs    · Numbness in your genital area or across your lower back    · Unable to control your urine or bowel movements  Treatment for acute low back pain  may include any of the following:  · Medicines:      ¨ NSAIDs  help decrease swelling and pain or fever  This medicine is available with or without a doctor's order  NSAIDs can cause stomach bleeding or kidney problems in certain people  If you take blood thinner medicine, always ask your healthcare provider if NSAIDs are safe for you  Always read the medicine label and follow directions  ¨ Muscle relaxers  help decrease muscle spasms pain  ¨ Prescription pain medicine  may be given  Ask how to take this medicine safely  · Surgery  may be needed if your pain is severe and other treatments do not work  Surgery may be needed for conditions of the lumbar spine, such as herniated disc or spinal stenosis  Manage your symptoms:   · Sleep on a firm mattress  If you do not have a firm mattress, have someone move your mattress to the floor for a few days  A piece of plywood under your mattress can also help make it firmer  · Apply ice  on your lower back for 15 to 20 minutes every hour or as directed  Use an ice pack, or put crushed ice in a plastic bag  Cover it with a towel  Ice helps prevent tissue damage and decreases swelling and pain  You can alternate ice and heat  · Apply heat  on your lower back for 20 to 30 minutes every 2 hours for as many days as directed  Heat helps decrease pain and muscle spasms  · Go to physical therapy  A physical therapist teaches you exercises to help improve movement and strength, and to decrease pain  Prevent acute low back pain:   · Use proper body mechanics  ¨ Bend at the hips and knees when you  objects  Do not bend from the waist  Use your leg muscles as you lift the load  Do not use your back  Keep the object close to your chest as you lift it  Try not to twist or lift anything above your waist     ¨ Change your position often when you stand for long periods of time  Rest one foot on a small box or footrest, and then switch to the other foot often  ¨ Try not to sit for long periods of time   When you do, sit in a straight-backed chair with your feet flat on the floor  Never reach, pull, or push while you are sitting  · Exercise regularly  Warm up before you exercise  Do exercises that strengthen your back muscles  Ask about the best exercise plan for you  · Maintain a healthy weight  Ask your healthcare provider how much you should weigh  Ask him to help you create a weight loss plan if you are overweight  Follow up with your healthcare provider as directed:  Return for a follow-up visit if you still have pain after 1 to 3 weeks of treatment  You may need to visit an orthopedist if your back pain lasts more than 6 to 12 weeks  Write down your questions so you remember to ask them during your visits  CARE AGREEMENT:   You have the right to help plan your care  Learn about your health condition and how it may be treated  Discuss treatment options with your caregivers to decide what care you want to receive  You always have the right to refuse treatment  The above information is an  only  It is not intended as medical advice for individual conditions or treatments  Talk to your doctor, nurse or pharmacist before following any medical regimen to see if it is safe and effective for you  © 2014 [address] is for End User's use only and may not be sold, redistributed or otherwise used for commercial purposes  All illustrations and images included in CareNotes® are the copyrighted property of A [employer] M [employer] Inc  or [employer]

## 2018-02-20 NOTE — ED PROVIDER NOTES
History  Chief Complaint   Patient presents with    Flank Pain     c/o left sided flank pain and abdominal pain x 1 week with n/v/d  C/o L flank pain since 6 days ago, constant  Pt  Took percocet for pain and it didn't help  +n/v a few times  +loose stools since yest   No pain with urination  +urgency  No fevers  Pt  Never had a kidney disease  Pt  Has had this pain on and off for a year  She's had CT's to work this up in the past that have been negative  Most recent CT a/p from 18 showed:     IMPRESSION:     CT abnormality in the abdomen or pelvis to definitively account for the patient's symptoms  No significant interval change from previous examination      Hepatic steatosis  Left adnexal oblong cystic structure most consistent with paraovarian cyst or other loculated simple fluid collection, better evaluated on ultrasound 2017      Findings are consistent with the preliminary report from [facility] which was provided shortly after completion of the exam                      Prior to Admission Medications   Prescriptions Last Dose Informant Patient Reported? Taking?    Insulin Pen Needle (PEN NEEDLES) 31G X 8 MM MISC   Yes No   Sig: Inject 1 Stick under the skin 4 (four) times a day (with meals and at bedtime)   Lancets (FREESTYLE) lancets   No No   Sig: by Other route 3 (three) times a day   amphetamine-dextroamphetamine (ADDERALL XR) 20 MG 24 hr capsule   No Yes   Sig: Take 1 capsule by mouth 2 (two) times a day Max Daily Amount: 40 mg   cycloSPORINE (RESTASIS) 0 05 % ophthalmic emulsion   Yes Yes   Sig: Administer 1 drop to both eyes 2 (two) times a day   gabapentin (NEURONTIN) 300 mg capsule   Yes Yes   Sig: Take 1 capsule by mouth 3 (three) times a day   glucose blood (FREESTYLE LITE) test strip   No Yes   Si each by Other route 3 (three) times a day   insulin glargine (LANTUS SOLOSTAR) injection pen 100 units/mL   No Yes   Sig: Inject 0 22 mL (22 Units total) under the skin daily at bedtime   Patient taking differently: Inject 22 Units under the skin daily at bedtime Daily at bedtime as needed    insulin lispro (HUMALOG KWIKPEN) 100 Units/mL SOPN   No Yes   Sig: Inject 8 Units under the skin 3 (three) times a day with meals   lubiprostone (AMITIZA) 24 mcg capsule Past Month at Unknown time  Yes Yes   Sig: Take 1 capsule by mouth Twice daily   methocarbamol (ROBAXIN) 500 mg tablet   Yes Yes   Sig: Take 1 tablet by mouth 3 (three) times a day   metoprolol succinate (TOPROL-XL) 25 mg 24 hr tablet   Yes Yes   Sig: Take 1 tablet by mouth daily   ondansetron (ZOFRAN) 4 mg tablet   Yes Yes   Sig: Take 4 mg by mouth every 8 (eight) hours as needed for nausea or vomiting   polyvinyl alcohol-povidone (REFRESH) 1 4-0 6 % ophthalmic solution   Yes Yes   Si-2 drops as needed for wound care   sucralfate (CARAFATE) 1 g/10 mL suspension   Yes Yes   Sig: Take 10 mL by mouth 3 (three) times a day      Facility-Administered Medications: None       Past Medical History:   Diagnosis Date    Anemia of chronic disease     Anxiety     Asthma     Asthma     Chronic abdominal pain     CKD (chronic kidney disease) stage 3, GFR 30-59 ml/min     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     Cushing syndrome [facility]     Diabetes mellitus [facility]     DVT (deep venous thrombosis) (HCC)     History of acute pancreatitis     felt secondary to Bactrim    HTN (hypertension)     Hyperlipidemia     Hypertension     Microscopic polyangiitis (HCC)     MPA (microscopic polyangiitis) (HCC)     Renal disorder     Self-inflicted injury     self inflicted skin wounds    [facility]        Past Surgical History:   Procedure Laterality Date    ESOPHAGOGASTRODUODENOSCOPY  2015    mild antral gastritis    RELEASE SCAR CONTRACTURE / GRAFT REPAIRS OF HAND         Family History   Problem Relation Age of Onset    Colon cancer Neg Hx     Drug abuse Neg Hx      mother father    Mental illness Neg Hx      disorder, mother father     I have reviewed and agree with the history as documented  Social History   Substance Use Topics    Smoking status: Former Smoker     Types: Cigarettes     Quit date: 2010    Smokeless tobacco: Never Used    Alcohol use No        Review of Systems   Constitutional: Negative for appetite change, fatigue and fever  HENT: Negative for rhinorrhea and sore throat  Respiratory: Negative for cough, shortness of breath and wheezing  Cardiovascular: Negative for chest pain and leg swelling  Gastrointestinal: Positive for nausea and vomiting  Negative for abdominal pain and diarrhea  Genitourinary: Positive for flank pain and urgency  Negative for dysuria  Musculoskeletal: Negative for back pain and neck pain  Skin: Negative for rash  Neurological: Negative for syncope and headaches  Psychiatric/Behavioral:        Mood normal       Physical Exam  ED Triage Vitals   Temperature Pulse Respirations Blood Pressure SpO2   02/20/18 0851 02/20/18 0851 02/20/18 0851 02/20/18 0851 02/20/18 0851   (!) 97 4 °F (36 3 °C) 85 18 (!) 174/96 97 %      Temp Source Heart Rate Source Patient Position - Orthostatic VS BP Location FiO2 (%)   02/20/18 0851 02/20/18 1108 02/20/18 1108 02/20/18 1108 --   Temporal Monitor Lying Left arm       Pain Score       02/20/18 0851       9           Orthostatic Vital Signs  Vitals:    02/20/18 0851 02/20/18 1108 02/20/18 1217 02/20/18 1342   BP: (!) 174/96 139/78 123/71 136/74   Pulse: 85 84 71 74   Patient Position - Orthostatic VS:  Lying  Lying       Physical Exam   Constitutional: She is oriented to person, place, and time  She appears well-developed and well-nourished  HENT:   Head: Normocephalic and atraumatic  Neck: Normal range of motion  Neck supple  Cardiovascular: Normal rate and regular rhythm  Pulmonary/Chest: Effort normal and breath sounds normal    Abdominal: Soft  There is no tenderness  Musculoskeletal:   L cva tenderness   Neurological: She is alert and oriented to person, place, and time  Skin: Skin is warm and dry  Nursing note and vitals reviewed  ED Medications  Medications   sodium chloride 0 9 % bolus 1,000 mL (0 mL Intravenous Stopped 2/20/18 1337)   ondansetron (ZOFRAN) injection 4 mg (4 mg Intravenous Given 2/20/18 0938)   morphine (PF) 4 mg/mL injection 4 mg (4 mg Intravenous Given 2/20/18 0938)   sodium chloride 0 9 % bolus 1,000 mL (0 mL Intravenous Stopped 2/20/18 1337)   morphine (PF) 4 mg/mL injection 4 mg (4 mg Intravenous Given 2/20/18 1307)       Diagnostic Studies  Results Reviewed     Procedure Component Value Units Date/Time    Comprehensive metabolic panel [[id number]]  (Abnormal) Collected:  02/20/18 0333    Lab Status:  Final result Specimen:  Blood from Arm, Right Updated:  02/20/18 1008     Sodium 142 mmol/L      Potassium 5 5 (H) mmol/L      Chloride 107 mmol/L      CO2 26 mmol/L      Anion Gap 9 mmol/L      BUN 30 (H) mg/dL      Creatinine 1 96 (H) mg/dL      Glucose 110 mg/dL      Calcium 9 5 mg/dL      AST 41 U/L      ALT 26 U/L      Alkaline Phosphatase 128 (H) U/L      Total Protein 8 3 (H) g/dL      Albumin 3 8 g/dL      Total Bilirubin 0 29 mg/dL      eGFR 30 ml/min/1 73sq m     Narrative:         National Kidney Disease Education Program recommendations are as follows:  GFR calculation is accurate only with a steady state creatinine  Chronic Kidney disease less than 60 ml/min/1 73 sq  meters  Kidney failure less than 15 ml/min/1 73 sq  meters      Lipase [[id number]]  (Abnormal) Collected:  02/20/18 0938    Lab Status:  Final result Specimen:  Blood from Arm, Right Updated:  02/20/18 1008     Lipase 743 (H) u/L     CBC and differential [[id number]]  (Normal) Collected:  02/20/18 0938    Lab Status:  Final result Specimen:  Blood from Arm, Right Updated:  02/20/18 0955     WBC 5 58 Thousand/uL      RBC 4 10 Million/uL      Hemoglobin 12 7 g/dL      Hematocrit 38 0 %      MCV 93 fL      MCH 31 0 pg      MCHC 33 4 g/dL      RDW 13 6 %      MPV 10 3 fL      Platelets 171 Thousands/uL      nRBC 0 /100 WBCs      Neutrophils Relative 66 %      Lymphocytes Relative 25 %      Monocytes Relative 8 %      Eosinophils Relative 1 %      Basophils Relative 0 %      Neutrophils Absolute 3 68 Thousands/µL      Lymphocytes Absolute 1 38 Thousands/µL      Monocytes Absolute 0 45 Thousand/µL      Eosinophils Absolute 0 06 Thousand/µL      Basophils Absolute 0 01 Thousands/µL     Urine Microscopic [[id number]]  (Abnormal) Collected:  02/20/18 0910    Lab Status:  Final result Specimen:  Urine from Urine, Clean Catch Updated:  02/20/18 0932     RBC, UA None Seen /hpf      WBC, UA 4-10 (A) /hpf      Epithelial Cells Occasional /hpf      Bacteria, UA Occasional /hpf      OTHER OBSERVATIONS Trichomonas Organisms Present    POCT urinalysis dipstick [[id number]]  (Normal) Resulted:  02/20/18 0912    Lab Status:  Final result Specimen:  Urine from Urine, Other Updated:  02/20/18 0913     Color, UA yellow    ED Urine Macroscopic [[id number]]  (Abnormal) Collected:  02/20/18 0910    Lab Status:  Final result Specimen:  Urine Updated:  02/20/18 0909     Color, UA Yellow     Clarity, UA Clear     pH, UA 5 5     Leukocytes, UA Small (A)     Nitrite, UA Negative     Protein, UA >=300 (A) mg/dl      Glucose, UA Negative mg/dl      Ketones, UA Negative mg/dl      Urobilinogen, UA 0 2 E U /dl      Bilirubin, UA Negative     Blood, UA Small (A)     Specific Gravity, UA >=1 030    Narrative:       CLINITEK RESULT                 CT abdomen pelvis wo contrast   Final Result by Marcelo Burks MD (02/20 1257)      No acute intra-abdominal abnormality                 Workstation performed: [id number]                    Procedures  Procedures       Phone Contacts  ED Phone Contact    ED Course  ED Course                                MDM  Number of Diagnoses or Management Options  Low back pain:      Amount and/or Complexity of Data Reviewed  Clinical lab tests: ordered and reviewed    Risk of Complications, Morbidity, and/or Mortality  Presenting problems: moderate      CritCare Time    Disposition  Final diagnoses:   Low back pain     Time reflects when diagnosis was documented in both MDM as applicable and the Disposition within this note     Time User Action Codes Description Comment    2/20/2018  1:32 PM Jhonatan Orellana Add [M54 5] Low back pain       ED Disposition     ED Disposition Condition Comment    Discharge  Haider Carroll discharge to home/self care      Condition at discharge: Stable        Follow-up Information     Follow up With Specialties Details Why Contact Info    Contreras Mesa PA-C Family Medicine, Physician Assistant   [facility]  [address] [place] [address],[address]  [phone]          Discharge Medication List as of 2/20/2018  1:33 PM      CONTINUE these medications which have NOT CHANGED    Details   amphetamine-dextroamphetamine (ADDERALL XR) 20 MG 24 hr capsule Take 1 capsule by mouth 2 (two) times a day Max Daily Amount: 40 mg, Starting Thu 1/25/2018, Print      cycloSPORINE (RESTASIS) 0 05 % ophthalmic emulsion Administer 1 drop to both eyes 2 (two) times a day, Historical Med      gabapentin (NEURONTIN) 300 mg capsule Take 1 capsule by mouth 3 (three) times a day, Historical Med      glucose blood (FREESTYLE LITE) test strip 1 each by Other route 3 (three) times a day, Starting Thu 2/1/2018, Normal      insulin glargine (LANTUS SOLOSTAR) injection pen 100 units/mL Inject 0 22 mL (22 Units total) under the skin daily at bedtime, Starting Thu 2/1/2018, Normal      insulin lispro (HUMALOG KWIKPEN) 100 Units/mL SOPN Inject 8 Units under the skin 3 (three) times a day with meals, Starting Thu 2/1/2018, Normal      lubiprostone (AMITIZA) 24 mcg capsule Take 1 capsule by mouth Twice daily, Starting Wed 9/27/2017, Historical Med      methocarbamol (ROBAXIN) 500 mg tablet Take 1 tablet by mouth 3 (three) times a day, Starting Wed 5/24/2017, Historical Med      metoprolol succinate (TOPROL-XL) 25 mg 24 hr tablet Take 1 tablet by mouth daily, Starting Wed 11/4/2015, Historical Med      ondansetron (ZOFRAN) 4 mg tablet Take 4 mg by mouth every 8 (eight) hours as needed for nausea or vomiting, Historical Med      polyvinyl alcohol-povidone (REFRESH) 1 4-0 6 % ophthalmic solution 1-2 drops as needed for wound care, Historical Med      sucralfate (CARAFATE) 1 g/10 mL suspension Take 10 mL by mouth 3 (three) times a day, Starting Fri 9/22/2017, Historical Med      oxyCODONE-acetaminophen (PERCOCET) 7 5-325 MG per tablet Take 1 tablet by mouth every 6 (six) hours as needed (pain) Max Daily Amount: 4 tablets, Starting Thu 1/25/2018, Print      QUEtiapine (SEROquel) 50 mg tablet Take 50 mg by mouth daily at bedtime, Historical Med      Insulin Pen Needle (PEN NEEDLES) 31G X 8 MM MISC Inject 1 Stick under the skin 4 (four) times a day (with meals and at bedtime), Starting Tue 7/19/2016, Historical Med      Lancets (FREESTYLE) lancets by Other route 3 (three) times a day, Starting Thu 2/1/2018, Normal           No discharge procedures on file      ED Provider  Electronically Signed by           Rosalie Mccall MD  03/02/18 0197

## 2018-02-23 ENCOUNTER — TELEPHONE (OUTPATIENT)
Dept: FAMILY MEDICINE CLINIC | Facility: CLINIC | Age: 48
End: 2018-02-23

## 2018-02-24 ENCOUNTER — HOSPITAL ENCOUNTER (EMERGENCY)
Facility: HOSPITAL | Age: 48
Discharge: HOME | End: 2018-02-24
Attending: EMERGENCY MEDICINE
Payer: COMMERCIAL

## 2018-02-24 VITALS
HEART RATE: 64 BPM | DIASTOLIC BLOOD PRESSURE: 87 MMHG | RESPIRATION RATE: 16 BRPM | OXYGEN SATURATION: 94 % | TEMPERATURE: 97.8 F | SYSTOLIC BLOOD PRESSURE: 119 MMHG

## 2018-02-24 DIAGNOSIS — R10.9 CHRONIC ABDOMINAL PAIN: Primary | ICD-10-CM

## 2018-02-24 DIAGNOSIS — G89.29 CHRONIC ABDOMINAL PAIN: Primary | ICD-10-CM

## 2018-02-24 LAB
ALBUMIN SERPL BCP-MCNC: 3.5 G/DL (ref 3.5–5)
ALP SERPL-CCNC: 132 U/L (ref 46–116)
ALT SERPL W P-5'-P-CCNC: 24 U/L (ref 12–78)
ANION GAP SERPL CALCULATED.3IONS-SCNC: 10 MMOL/L (ref 4–13)
AST SERPL W P-5'-P-CCNC: 20 U/L (ref 5–45)
BACTERIA UR QL AUTO: ABNORMAL /HPF
BASOPHILS # BLD AUTO: 0.01 THOUSANDS/ΜL (ref 0–0.1)
BASOPHILS NFR BLD AUTO: 0 % (ref 0–1)
BILIRUB SERPL-MCNC: 0.21 MG/DL (ref 0.2–1)
BILIRUB UR QL STRIP: NEGATIVE
BUN SERPL-MCNC: 31 MG/DL (ref 5–25)
CALCIUM SERPL-MCNC: 8.7 MG/DL (ref 8.3–10.1)
CHLORIDE SERPL-SCNC: 107 MMOL/L (ref 100–108)
CLARITY UR: CLEAR
CO2 SERPL-SCNC: 24 MMOL/L (ref 21–32)
COLOR UR: YELLOW
CREAT SERPL-MCNC: 1.85 MG/DL (ref 0.6–1.3)
EOSINOPHIL # BLD AUTO: 0.04 THOUSAND/ΜL (ref 0–0.61)
EOSINOPHIL NFR BLD AUTO: 1 % (ref 0–6)
ERYTHROCYTE [DISTWIDTH] IN BLOOD BY AUTOMATED COUNT: 13.4 % (ref 11.6–15.1)
EXT PREG TEST URINE: NEGATIVE
GFR SERPL CREATININE-BSD FRML MDRD: 32 ML/MIN/1.73SQ M
GLUCOSE SERPL-MCNC: 163 MG/DL (ref 65–140)
GLUCOSE UR STRIP-MCNC: NEGATIVE MG/DL
HCT VFR BLD AUTO: 37.8 % (ref 34.8–46.1)
HGB BLD-MCNC: 12.6 G/DL (ref 11.5–15.4)
HGB UR QL STRIP.AUTO: ABNORMAL
HYALINE CASTS #/AREA URNS LPF: ABNORMAL /LPF
KETONES UR STRIP-MCNC: NEGATIVE MG/DL
LEUKOCYTE ESTERASE UR QL STRIP: ABNORMAL
LIPASE SERPL-CCNC: 277 U/L (ref 73–393)
LYMPHOCYTES # BLD AUTO: 1.18 THOUSANDS/ΜL (ref 0.6–4.47)
LYMPHOCYTES NFR BLD AUTO: 21 % (ref 14–44)
MCH RBC QN AUTO: 30.6 PG (ref 26.8–34.3)
MCHC RBC AUTO-ENTMCNC: 33.3 G/DL (ref 31.4–37.4)
MCV RBC AUTO: 92 FL (ref 82–98)
MONOCYTES # BLD AUTO: 0.38 THOUSAND/ΜL (ref 0.17–1.22)
MONOCYTES NFR BLD AUTO: 7 % (ref 4–12)
MUCOUS THREADS UR QL AUTO: ABNORMAL
NEUTROPHILS # BLD AUTO: 4.09 THOUSANDS/ΜL (ref 1.85–7.62)
NEUTS SEG NFR BLD AUTO: 71 % (ref 43–75)
NITRITE UR QL STRIP: NEGATIVE
NON-SQ EPI CELLS URNS QL MICRO: ABNORMAL /HPF
NRBC BLD AUTO-RTO: 0 /100 WBCS
PH UR STRIP.AUTO: 5.5 [PH] (ref 4.5–8)
PLATELET # BLD AUTO: 266 THOUSANDS/UL (ref 149–390)
PMV BLD AUTO: 10 FL (ref 8.9–12.7)
POTASSIUM SERPL-SCNC: 4.4 MMOL/L (ref 3.5–5.3)
PROT SERPL-MCNC: 7.5 G/DL (ref 6.4–8.2)
PROT UR STRIP-MCNC: >=300 MG/DL
RBC # BLD AUTO: 4.12 MILLION/UL (ref 3.81–5.12)
RBC #/AREA URNS AUTO: ABNORMAL /HPF
SODIUM SERPL-SCNC: 141 MMOL/L (ref 136–145)
SP GR UR STRIP.AUTO: 1.02 (ref 1–1.03)
UROBILINOGEN UR QL STRIP.AUTO: 0.2 E.U./DL
WBC # BLD AUTO: 5.7 THOUSAND/UL (ref 4.31–10.16)
WBC #/AREA URNS AUTO: ABNORMAL /HPF

## 2018-02-24 PROCEDURE — 85025 COMPLETE CBC W/AUTO DIFF WBC: CPT | Performed by: EMERGENCY MEDICINE

## 2018-02-24 PROCEDURE — 80053 COMPREHEN METABOLIC PANEL: CPT | Performed by: EMERGENCY MEDICINE

## 2018-02-24 PROCEDURE — 83690 ASSAY OF LIPASE: CPT | Performed by: EMERGENCY MEDICINE

## 2018-02-24 PROCEDURE — 96375 TX/PRO/DX INJ NEW DRUG ADDON: CPT

## 2018-02-24 PROCEDURE — 36415 COLL VENOUS BLD VENIPUNCTURE: CPT | Performed by: EMERGENCY MEDICINE

## 2018-02-24 PROCEDURE — 81002 URINALYSIS NONAUTO W/O SCOPE: CPT | Performed by: EMERGENCY MEDICINE

## 2018-02-24 PROCEDURE — 81025 URINE PREGNANCY TEST: CPT | Performed by: EMERGENCY MEDICINE

## 2018-02-24 PROCEDURE — 81001 URINALYSIS AUTO W/SCOPE: CPT

## 2018-02-24 PROCEDURE — 96374 THER/PROPH/DIAG INJ IV PUSH: CPT

## 2018-02-24 PROCEDURE — 99284 EMERGENCY DEPT VISIT MOD MDM: CPT

## 2018-02-24 PROCEDURE — 96361 HYDRATE IV INFUSION ADD-ON: CPT

## 2018-02-24 RX ORDER — ONDANSETRON 2 MG/ML
4 INJECTION INTRAMUSCULAR; INTRAVENOUS ONCE
Status: COMPLETED | OUTPATIENT
Start: 2018-02-24 | End: 2018-02-24

## 2018-02-24 RX ORDER — ONDANSETRON 4 MG/1
4 TABLET, FILM COATED ORAL EVERY 6 HOURS
Qty: 12 TABLET | Refills: 0 | Status: SHIPPED | OUTPATIENT
Start: 2018-02-24 | End: 2018-03-19

## 2018-02-24 RX ADMIN — HYDROMORPHONE HYDROCHLORIDE 1 MG: 1 INJECTION, SOLUTION INTRAMUSCULAR; INTRAVENOUS; SUBCUTANEOUS at 10:57

## 2018-02-24 RX ADMIN — SODIUM CHLORIDE 1000 ML: 0.9 INJECTION, SOLUTION INTRAVENOUS at 11:45

## 2018-02-24 RX ADMIN — ONDANSETRON 4 MG: 2 INJECTION INTRAMUSCULAR; INTRAVENOUS at 10:56

## 2018-02-24 NOTE — ED PROVIDER NOTES
History  Chief Complaint   Patient presents with    Abdominal Pain     Generalized abdominal pain and nausea that started two days ago  Abdominal Pain   Pain location:  Epigastric  Pain quality: aching    Pain radiates to:  Back  Pain severity:  Moderate  Onset quality:  Gradual  Duration:  5 days  Timing:  Constant  Progression:  Worsening  Chronicity:  Recurrent (pancreatitis)  Context: eating    Relieved by:  Nothing  Worsened by:  Eating  Ineffective treatments: Prescription narcotics  Associated symptoms: nausea and vomiting (once)    Associated symptoms: no chest pain, no chills, no cough, no diarrhea, no dysuria, no fever, no hematemesis, no hematochezia, no hematuria, no shortness of breath and no sore throat        Prior to Admission Medications   Prescriptions Last Dose Informant Patient Reported? Taking?    Insulin Pen Needle (PEN NEEDLES) 31G X 8 MM MISC   Yes Yes   Sig: Inject 1 Stick under the skin 4 (four) times a day (with meals and at bedtime)   Lancets (FREESTYLE) lancets   No Yes   Sig: by Other route 3 (three) times a day   QUEtiapine (SEROquel) 50 mg tablet   Yes Yes   Sig: Take 50 mg by mouth daily at bedtime   amphetamine-dextroamphetamine (ADDERALL XR) 20 MG 24 hr capsule   No Yes   Sig: Take 1 capsule by mouth 2 (two) times a day Max Daily Amount: 40 mg   cycloSPORINE (RESTASIS) 0 05 % ophthalmic emulsion   Yes Yes   Sig: Administer 1 drop to both eyes 2 (two) times a day   gabapentin (NEURONTIN) 300 mg capsule   Yes Yes   Sig: Take 1 capsule by mouth 3 (three) times a day   glucose blood (FREESTYLE LITE) test strip   No Yes   Si each by Other route 3 (three) times a day   insulin glargine (LANTUS SOLOSTAR) injection pen 100 units/mL   No Yes   Sig: Inject 0 22 mL (22 Units total) under the skin daily at bedtime   Patient taking differently: Inject 22 Units under the skin daily at bedtime Daily at bedtime as needed    insulin lispro (HUMALOG KWIKPEN) 100 Units/mL SOPN   No Yes Sig: Inject 8 Units under the skin 3 (three) times a day with meals   lubiprostone (AMITIZA) 24 mcg capsule   Yes Yes   Sig: Take 1 capsule by mouth Twice daily   methocarbamol (ROBAXIN) 500 mg tablet   Yes Yes   Sig: Take 1 tablet by mouth 3 (three) times a day   metoprolol succinate (TOPROL-XL) 25 mg 24 hr tablet   Yes Yes   Sig: Take 1 tablet by mouth daily   ondansetron (ZOFRAN) 4 mg tablet   Yes Yes   Sig: Take 4 mg by mouth every 8 (eight) hours as needed for nausea or vomiting   oxyCODONE-acetaminophen (PERCOCET) 7 5-325 MG per tablet   No Yes   Sig: Take 1 tablet by mouth every 6 (six) hours as needed (pain) Max Daily Amount: 4 tablets   polyvinyl alcohol-povidone (REFRESH) 1 4-0 6 % ophthalmic solution   Yes Yes   Si-2 drops as needed for wound care   sucralfate (CARAFATE) 1 g/10 mL suspension   Yes Yes   Sig: Take 10 mL by mouth 3 (three) times a day      Facility-Administered Medications: None       Past Medical History:   Diagnosis Date    Anemia of chronic disease     Anxiety     Asthma     Asthma     Chronic abdominal pain     CKD (chronic kidney disease) stage 3, GFR 30-59 ml/min     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     [facility] syndrome [facility]     Diabetes mellitus [facility]     DVT (deep venous thrombosis) (HCC)     History of acute pancreatitis     felt secondary to Bactrim    HTN (hypertension)     Hyperlipidemia     Hypertension     Microscopic polyangiitis (HCC)     MPA (microscopic polyangiitis) (HCC)     Renal disorder     Self-inflicted injury     self inflicted skin wounds    Wegener's granulomatosis with renal involvement ([facility] [facility]        Past Surgical History:   Procedure Laterality Date    ESOPHAGOGASTRODUODENOSCOPY  2015    mild antral gastritis    RELEASE SCAR CONTRACTURE / GRAFT REPAIRS OF HAND         Family History   Problem Relation Age of Onset    Colon cancer Neg Hx     Drug abuse Neg Hx      mother father    Mental illness Neg Hx      disorder, mother father I have reviewed and agree with the history as documented  Social History   Substance Use Topics    Smoking status: Former Smoker     Types: Cigarettes     Quit date: 2010    Smokeless tobacco: Never Used    Alcohol use No        Review of Systems   Constitutional: Positive for appetite change  Negative for chills and fever  HENT: Negative for sore throat  Respiratory: Negative for cough, shortness of breath and wheezing  Cardiovascular: Negative for chest pain and palpitations  Gastrointestinal: Positive for abdominal pain, nausea and vomiting (once)  Negative for diarrhea, hematemesis and hematochezia  Genitourinary: Negative for dysuria and hematuria  Musculoskeletal: Negative for neck pain  Skin: Negative for rash  Neurological: Negative for dizziness, weakness and headaches  Psychiatric/Behavioral: Negative for suicidal ideas  All other systems reviewed and are negative  Physical Exam  ED Triage Vitals   Temperature Pulse Respirations Blood Pressure SpO2   02/24/18 0956 02/24/18 0956 02/24/18 0956 02/24/18 0958 02/24/18 0956   97 8 °F (36 6 °C) 93 18 161/88 97 %      Temp Source Heart Rate Source Patient Position - Orthostatic VS BP Location FiO2 (%)   02/24/18 0956 02/24/18 1057 02/24/18 1057 02/24/18 1057 --   Temporal Monitor Lying Right arm       Pain Score       02/24/18 0956       Worst Possible Pain           Orthostatic Vital Signs  Vitals:    02/24/18 0958 02/24/18 1057 02/24/18 1100 02/24/18 1158   BP: 161/88 154/100 154/100 119/87   Pulse:  83 80 64   Patient Position - Orthostatic VS:  Lying  Lying       Physical Exam   Constitutional: She is oriented to person, place, and time  Vital signs are normal  She appears well-developed and well-nourished  Non-toxic appearance  HENT:   Head: Normocephalic and atraumatic     Right Ear: Tympanic membrane and external ear normal    Left Ear: Tympanic membrane and external ear normal    Nose: Nose normal    Mouth/Throat: Oropharynx is clear and moist    Eyes: Conjunctivae and EOM are normal  Pupils are equal, round, and reactive to light  Neck: Normal range of motion and full passive range of motion without pain  Neck supple  No Brudzinski's sign and no Kernig's sign noted  Cardiovascular: Normal rate, regular rhythm, normal heart sounds, intact distal pulses and normal pulses  No murmur heard  Pulmonary/Chest: Effort normal and breath sounds normal  No tachypnea  No respiratory distress  She has no wheezes  Abdominal: Soft  Bowel sounds are normal  She exhibits distension  There is tenderness in the epigastric area  There is no rigidity, no rebound and no guarding  Musculoskeletal: Normal range of motion  Right lower leg: She exhibits no swelling  Left lower leg: She exhibits no swelling  Lymphadenopathy:     She has no cervical adenopathy  Neurological: She is alert and oriented to person, place, and time  She has normal strength and normal reflexes  No cranial nerve deficit or sensory deficit  Coordination and gait normal  GCS eye subscore is 4  GCS verbal subscore is 5  GCS motor subscore is 6  Skin: Skin is warm and dry  No rash noted  She is not diaphoretic  No pallor  Psychiatric: She has a normal mood and affect  Her speech is normal and behavior is normal  Judgment and thought content normal  Cognition and memory are normal    Nursing note and vitals reviewed        ED Medications  Medications   HYDROmorphone (DILAUDID) injection 1 mg (1 mg Intravenous Given 2/24/18 1057)   ondansetron (ZOFRAN) injection 4 mg (4 mg Intravenous Given 2/24/18 1056)   sodium chloride 0 9 % bolus 1,000 mL (0 mL Intravenous Stopped 2/24/18 1255)       Diagnostic Studies  Results Reviewed     Procedure Component Value Units Date/Time    Urine Microscopic [[id number]]  (Abnormal) Collected:  02/24/18 1138    Lab Status:  Final result Specimen:  Urine from Urine, Clean Catch Updated:  02/24/18 1221     RBC, UA 2-4 (A) /hpf      WBC, UA 2-4 (A) /hpf      Epithelial Cells Occasional /hpf      Bacteria, UA Occasional /hpf      Hyaline Casts, UA 0-1 (A) /lpf      MUCOUS THREADS Occasional    POCT urinalysis dipstick [[id number]]  (Abnormal) Resulted:  02/24/18 1139    Lab Status:  Final result Specimen:  Urine from Urine, Other Updated:  02/24/18 1139    POCT pregnancy, urine [[id number]]  (Normal) Resulted:  02/24/18 1138    Lab Status:  Final result Specimen:  Urine Updated:  02/24/18 1138     EXT PREG TEST UR (Ref: Negative) negative    ED Urine Macroscopic [[id number]]  (Abnormal) Collected:  02/24/18 1138    Lab Status:  Final result Specimen:  Urine Updated:  02/24/18 1137     Color, UA Yellow     Clarity, UA Clear     pH, UA 5 5     Leukocytes, UA Small (A)     Nitrite, UA Negative     Protein, UA >=300 (A) mg/dl      Glucose, UA Negative mg/dl      Ketones, UA Negative mg/dl      Urobilinogen, UA 0 2 E U /dl      Bilirubin, UA Negative     Blood, UA Small (A)     Specific [place], UA 1 025    Narrative:       CLINITEK RESULT    Comprehensive metabolic panel [[id number]]  (Abnormal) Collected:  02/24/18 1034    Lab Status:  Final result Specimen:  Blood from Arm, Right Updated:  02/24/18 1121     Sodium 141 mmol/L      Potassium 4 4 mmol/L      Chloride 107 mmol/L      CO2 24 mmol/L      Anion Gap 10 mmol/L      BUN 31 (H) mg/dL      Creatinine 1 85 (H) mg/dL      Glucose 163 (H) mg/dL      Calcium 8 7 mg/dL      AST 20 U/L      ALT 24 U/L      Alkaline Phosphatase 132 (H) U/L      Total Protein 7 5 g/dL      Albumin 3 5 g/dL      Total Bilirubin 0 21 mg/dL      eGFR 32 ml/min/1 73sq m     Narrative:         National Kidney Disease Education Program recommendations are as follows:  GFR calculation is accurate only with a steady state creatinine  Chronic Kidney disease less than 60 ml/min/1 73 sq  meters  Kidney failure less than 15 ml/min/1 73 sq  meters      Lipase [[id number]]  (Normal) Collected:  02/24/18 1034    Lab Status:  Final result Specimen:  Blood from Arm, Right Updated:  02/24/18 1121     Lipase 277 u/L     CBC and differential [[id number]]  (Normal) Collected:  02/24/18 1034    Lab Status:  Final result Specimen:  Blood from Arm, Right Updated:  02/24/18 1041     WBC 5 70 Thousand/uL      RBC 4 12 Million/uL      Hemoglobin 12 6 g/dL      Hematocrit 37 8 %      MCV 92 fL      MCH 30 6 pg      MCHC 33 3 g/dL      RDW 13 4 %      MPV 10 0 fL      Platelets 337 Thousands/uL      nRBC 0 /100 WBCs      Neutrophils Relative 71 %      Lymphocytes Relative 21 %      Monocytes Relative 7 %      Eosinophils Relative 1 %      Basophils Relative 0 %      Neutrophils Absolute 4 09 Thousands/µL      Lymphocytes Absolute 1 18 Thousands/µL      Monocytes Absolute 0 38 Thousand/µL      Eosinophils Absolute 0 04 Thousand/µL      Basophils Absolute 0 01 Thousands/µL                  No orders to display              Procedures  Procedures       Phone Contacts  ED Phone Contact    ED Course  ED Course as of Feb 25 1301   Sat Feb 24, 2018   [address] [place]  oxydone 7 5 mg/325 #120 filled 2/2/18    1148 Improved from recent evaluation Lipase: 277   1148 Improved from recent evaluation Creatinine: (!) 1 85                               MDM  CritCare Time    Disposition  Final diagnoses:   Chronic abdominal pain     Time reflects when diagnosis was documented in both MDM as applicable and the Disposition within this note     Time User Action Codes Description Comment    2/24/2018 12:52 PM James Johns Add [R10 9,  G89 29] Chronic abdominal pain       ED Disposition     ED Disposition Condition Comment    Discharge  Marie Qureshi discharge to home/self care      Condition at discharge: Good        Follow-up Information     Follow up With Specialties Details Why Contact Info    Migel Toro PA-C Family Medicine Call For followup [facility]  [id number] [facility] [place] [address] [address] [address]  [phone]          Discharge Medication List as of 2/24/2018 12:54 PM      START taking these medications    Details   !! ondansetron (ZOFRAN) 4 mg tablet Take 1 tablet (4 mg total) by mouth every 6 (six) hours for 7 days, Starting Sat 2/24/2018, Until Sat 3/3/2018, Normal       !! - Potential duplicate medications found  Please discuss with provider        CONTINUE these medications which have NOT CHANGED    Details   amphetamine-dextroamphetamine (ADDERALL XR) 20 MG 24 hr capsule Take 1 capsule by mouth 2 (two) times a day Max Daily Amount: 40 mg, Starting Thu 1/25/2018, Print      cycloSPORINE (RESTASIS) 0 05 % ophthalmic emulsion Administer 1 drop to both eyes 2 (two) times a day, Historical Med      gabapentin (NEURONTIN) 300 mg capsule Take 1 capsule by mouth 3 (three) times a day, Historical Med      glucose blood (FREESTYLE LITE) test strip 1 each by Other route 3 (three) times a day, Starting Thu 2/1/2018, Normal      insulin glargine (LANTUS SOLOSTAR) injection pen 100 units/mL Inject 0 22 mL (22 Units total) under the skin daily at bedtime, Starting Thu 2/1/2018, Normal      insulin lispro (HUMALOG KWIKPEN) 100 Units/mL SOPN Inject 8 Units under the skin 3 (three) times a day with meals, Starting Thu 2/1/2018, Normal      Insulin Pen Needle (PEN NEEDLES) 31G X 8 MM MISC Inject 1 Stick under the skin 4 (four) times a day (with meals and at bedtime), Starting Tue 7/19/2016, Historical Med      Lancets (FREESTYLE) lancets by Other route 3 (three) times a day, Starting Thu 2/1/2018, Normal      lubiprostone (AMITIZA) 24 mcg capsule Take 1 capsule by mouth Twice daily, Starting Wed 9/27/2017, Historical Med      methocarbamol (ROBAXIN) 500 mg tablet Take 1 tablet by mouth 3 (three) times a day, Starting Wed 5/24/2017, Historical Med      metoprolol succinate (TOPROL-XL) 25 mg 24 hr tablet Take 1 tablet by mouth daily, Starting Wed 11/4/2015, Historical Med      !! ondansetron (ZOFRAN) 4 mg tablet Take 4 mg by mouth every 8 (eight) hours as needed for nausea or vomiting, Historical Med      oxyCODONE-acetaminophen (PERCOCET) 7 5-325 MG per tablet Take 1 tablet by mouth every 6 (six) hours as needed (pain) Max Daily Amount: 4 tablets, Starting Thu 1/25/2018, Print      polyvinyl alcohol-povidone (REFRESH) 1 4-0 6 % ophthalmic solution 1-2 drops as needed for wound care, Historical Med      QUEtiapine (SEROquel) 50 mg tablet Take 50 mg by mouth daily at bedtime, Historical Med      sucralfate (CARAFATE) 1 g/10 mL suspension Take 10 mL by mouth 3 (three) times a day, Starting Fri 9/22/2017, Historical Med       !! - Potential duplicate medications found  Please discuss with provider  No discharge procedures on file      ED Provider  Electronically Signed by           Amadeo Barthel, MD  02/25/18 9598

## 2018-02-24 NOTE — DISCHARGE INSTRUCTIONS
Your blood work is improving since your last visit, and your kidney function is improving  You should focus on drinking fluids, continue your medications, and avoid foods that exacerbate chronic pancreatitis

## 2018-02-27 ENCOUNTER — TELEPHONE (OUTPATIENT)
Dept: FAMILY MEDICINE CLINIC | Facility: CLINIC | Age: 48
End: 2018-02-27

## 2018-02-27 DIAGNOSIS — R06.89 DIFFICULTY BREATHING: ICD-10-CM

## 2018-02-27 DIAGNOSIS — G89.4 CHRONIC PAIN SYNDROME: ICD-10-CM

## 2018-02-27 DIAGNOSIS — F31.9 BIPOLAR 1 DISORDER (HCC): Primary | ICD-10-CM

## 2018-02-27 DIAGNOSIS — M31.30 WEGENER'S GRANULOMATOSIS: Chronic | ICD-10-CM

## 2018-02-27 RX ORDER — HUMIDIFIER
EACH MISCELLANEOUS AS NEEDED
Qty: 1 EACH | Refills: 0 | Status: SHIPPED | OUTPATIENT
Start: 2018-02-27 | End: 2020-11-10

## 2018-02-27 RX ORDER — QUETIAPINE FUMARATE 50 MG/1
50 TABLET, FILM COATED ORAL
Qty: 90 TABLET | Refills: 0 | Status: SHIPPED | OUTPATIENT
Start: 2018-02-27 | End: 2018-05-22 | Stop reason: SDUPTHER

## 2018-02-27 RX ORDER — OXYCODONE AND ACETAMINOPHEN 7.5; 325 MG/1; MG/1
1 TABLET ORAL EVERY 6 HOURS PRN
Qty: 120 TABLET | Refills: 0 | Status: SHIPPED | OUTPATIENT
Start: 2018-02-27 | End: 2018-03-22 | Stop reason: SDUPTHER

## 2018-03-05 ENCOUNTER — OFFICE VISIT (OUTPATIENT)
Dept: FAMILY MEDICINE CLINIC | Facility: CLINIC | Age: 48
End: 2018-03-05
Payer: COMMERCIAL

## 2018-03-05 VITALS
DIASTOLIC BLOOD PRESSURE: 80 MMHG | SYSTOLIC BLOOD PRESSURE: 120 MMHG | RESPIRATION RATE: 20 BRPM | TEMPERATURE: 96.5 F | OXYGEN SATURATION: 95 % | HEART RATE: 78 BPM

## 2018-03-05 DIAGNOSIS — B07.0 VERRUCA PLANTARIS: Primary | ICD-10-CM

## 2018-03-05 PROCEDURE — 99213 OFFICE O/P EST LOW 20 MIN: CPT | Performed by: PODIATRIST

## 2018-03-05 NOTE — PROGRESS NOTES
Podiatry Clinic Visit  Gisela Miller 52 y o  female MRN: [id number]  Encounter: [id number]    Assessment/Plan     Assessment:  1  Right plantar heel wart  2  DM    Plan:  - Patient was seen/examined  All questions and concerns addressed  - trimmed callus overlying plantar verruca right plantar heel without incident using a sterile 15 blade   -applied crytherapy to plantar verruca right heel  - RTC in 1 month      History of Present Illness     HPI:  Gisela Miller is a 52 y o  female who presents with right plantar heel wart  She was last here 2/12/18 and had cryotherapy  She is here for follow-up  She feels as though the pain has improved but the callus has come back  The patient denies any nausea, vomiting, fever, chills, shortness of breath, or chest pains  Consults  Review of Systems   Constitutional: Negative  HENT: Negative  Eyes: Negative  Respiratory: Negative  Cardiovascular: Negative  Gastrointestinal: Negative  Musculoskeletal: Negative   Skin: plantar wart right heel  Neurological: Negative          Historical Information   Past Medical History:   Diagnosis Date    Anemia of chronic disease     Anxiety     Asthma     Asthma     Chronic abdominal pain     CKD (chronic kidney disease) stage 3, GFR 30-59 ml/min     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 [address] [facility]     Cushing syndrome ([facility] [address] [facility]     Diabetes mellitus ([facility] [address]     DVT (deep venous thrombosis) ([facility] [address]     History of acute pancreatitis     felt secondary to Bactrim    HTN (hypertension)     Hyperlipidemia     Hypertension     Microscopic polyangiitis (HCC)     MPA (microscopic polyangiitis) (HCC)     Renal disorder     Self-inflicted injury     self inflicted skin wounds    Wegener's granulomatosis with renal involvement ([facility] [address] [facility] 2015     Past Surgical History:   Procedure Laterality Date    ESOPHAGOGASTRODUODENOSCOPY  09/11/2015    mild antral gastritis    RELEASE SCAR CONTRACTURE / GRAFT REPAIRS OF HAND       Social History   History Alcohol Use No     History   Drug Use    Types: Marijuana     History   Smoking Status    Former Smoker    Types: Cigarettes    Quit date: 2010   Smokeless Tobacco    Never Used     Family History:   Family History   Problem Relation Age of Onset    Colon cancer Neg Hx     Drug abuse Neg Hx      mother father    Mental illness Neg Hx      disorder, mother father       Meds/Allergies     (Not in a hospital admission)  Allergies   Allergen Reactions    Bactrim [Sulfamethoxazole-Trimethoprim]      Pt "They think that is what cause the pancreatitis"     Haldol [Haloperidol] Other (See Comments)     "I don't like it"    Ibuprofen     Navane [Thiothixene]      SI    Other      "novaine?" antipsychotic    Prozac [Fluoxetine Hcl]      SI    Lexapro [Escitalopram Oxalate] Rash       Objective     Current Vitals:   Blood Pressure: 120/80 (03/05/18 1103)  Pulse: 78 (03/05/18 1103)  Temperature: (!) 96 5 °F (35 8 °C) (03/05/18 1103)  Temp Source: Tympanic (03/05/18 1103)  Respirations: 20 (03/05/18 1103)  SpO2: 95 % (03/05/18 1103)        /80   Pulse 78   Temp (!) 96 5 °F (35 8 °C) (Tympanic)   Resp 20   LMP  (LMP Unknown)   SpO2 95%     General Appearance:    Alert, cooperative, no distress   Head:    Normocephalic, without obvious abnormality, atraumatic   Eyes:    PERRL, conjunctiva/corneas clear, EOM's intact            Nose:   Moist mucous membranes, no drainage or sinus tenderness   Throat:   No tenderness, no exudates   Neck:   Supple, symmetrical, trachea midline, no JVD   Back:     Symmetric, no CVA tenderness   Lungs:     Clear to auscultation bilaterally, respirations unlabored   Chest wall:    No tenderness or deformity   Heart:    Regular rate and rhythm, S1 and S2 normal, no murmur, rub   or gallop   Abdomen:     Soft, non-tender, bowel sounds active all four quadrants,     no masses, no organomegaly           Extremities:   MMT is 5/5 to all compartments of the LE, +0/4 edema B/L, Digital ROM is intact,    Pulses:   R DP is +2/4, R PT is +2/4, L DP is +2/4, L PT is +2/4, CFT< 3sec to all digits  Pedal hair is Present   Skin:   Plantar verruca right plantar-lateral heel with pinpoint bleeding centraly  No open Lesions  Skin of the LE is normal texture, turgor  Neurologic:   CNII-XII intact  Normal strength, sensation and reflexes       Throughout  Gross sensation is intact   Protective sensation is Intact

## 2018-03-08 ENCOUNTER — TELEPHONE (OUTPATIENT)
Dept: FAMILY MEDICINE CLINIC | Facility: CLINIC | Age: 48
End: 2018-03-08

## 2018-03-08 DIAGNOSIS — B35.4 TINEA CORPORIS: Primary | ICD-10-CM

## 2018-03-08 RX ORDER — NYSTATIN 100000 U/G
CREAM TOPICAL 2 TIMES DAILY
Qty: 30 G | Refills: 0 | Status: SHIPPED | OUTPATIENT
Start: 2018-03-08 | End: 2018-03-09 | Stop reason: ALTCHOICE

## 2018-03-08 NOTE — TELEPHONE ENCOUNTER
Will send over a cream  If there is no improvement in the rash by Monday, she needs to make an appointment

## 2018-03-09 ENCOUNTER — TELEPHONE (OUTPATIENT)
Dept: FAMILY MEDICINE CLINIC | Facility: CLINIC | Age: 48
End: 2018-03-09

## 2018-03-09 DIAGNOSIS — B35.4 TINEA CORPORIS: Primary | ICD-10-CM

## 2018-03-09 NOTE — TELEPHONE ENCOUNTER
Pt  called requesting if new script for cream can be written to include Triamcinolone with the Nystatin   Send to [address] [facility]

## 2018-03-12 DIAGNOSIS — IMO0001 IDDM (INSULIN DEPENDENT DIABETES MELLITUS): Chronic | ICD-10-CM

## 2018-03-14 DIAGNOSIS — F31.9 BIPOLAR 1 DISORDER (HCC): ICD-10-CM

## 2018-03-14 RX ORDER — DEXTROAMPHETAMINE SACCHARATE, AMPHETAMINE ASPARTATE, DEXTROAMPHETAMINE SULFATE AND AMPHETAMINE SULFATE 5; 5; 5; 5 MG/1; MG/1; MG/1; MG/1
20 TABLET ORAL
Qty: 60 TABLET | Refills: 0 | Status: SHIPPED | OUTPATIENT
Start: 2018-03-14 | End: 2018-03-20

## 2018-03-15 ENCOUNTER — TELEPHONE (OUTPATIENT)
Dept: FAMILY MEDICINE CLINIC | Facility: CLINIC | Age: 48
End: 2018-03-15

## 2018-03-15 DIAGNOSIS — IMO0001 IDDM (INSULIN DEPENDENT DIABETES MELLITUS): Chronic | ICD-10-CM

## 2018-03-15 DIAGNOSIS — R29.898 WEAKNESS OF BOTH LOWER EXTREMITIES: ICD-10-CM

## 2018-03-15 DIAGNOSIS — N76.0 ACUTE VAGINITIS: Primary | ICD-10-CM

## 2018-03-15 DIAGNOSIS — G47.411 CATAPLEXY: ICD-10-CM

## 2018-03-15 NOTE — TELEPHONE ENCOUNTER
Script is in the fax bin    Received a script for the scooter which has to be signed by Dr Caron Rincon

## 2018-03-16 ENCOUNTER — TELEPHONE (OUTPATIENT)
Dept: FAMILY MEDICINE CLINIC | Facility: CLINIC | Age: 48
End: 2018-03-16

## 2018-03-19 ENCOUNTER — HOSPITAL ENCOUNTER (EMERGENCY)
Facility: HOSPITAL | Age: 48
Discharge: HOME | End: 2018-03-19
Attending: EMERGENCY MEDICINE | Admitting: EMERGENCY MEDICINE
Payer: COMMERCIAL

## 2018-03-19 VITALS
DIASTOLIC BLOOD PRESSURE: 97 MMHG | OXYGEN SATURATION: 95 % | SYSTOLIC BLOOD PRESSURE: 180 MMHG | RESPIRATION RATE: 18 BRPM | WEIGHT: 196 LBS | HEART RATE: 76 BPM | TEMPERATURE: 97.5 F | BODY MASS INDEX: 35.85 KG/M2

## 2018-03-19 DIAGNOSIS — R10.13 EPIGASTRIC PAIN: Primary | ICD-10-CM

## 2018-03-19 LAB
ALBUMIN SERPL BCP-MCNC: 3.9 G/DL (ref 3.5–5)
ALP SERPL-CCNC: 150 U/L (ref 46–116)
ALT SERPL W P-5'-P-CCNC: 25 U/L (ref 12–78)
ANION GAP SERPL CALCULATED.3IONS-SCNC: 12 MMOL/L (ref 4–13)
AST SERPL W P-5'-P-CCNC: 21 U/L (ref 5–45)
BASOPHILS # BLD AUTO: 0.01 THOUSANDS/ΜL (ref 0–0.1)
BASOPHILS NFR BLD AUTO: 0 % (ref 0–1)
BILIRUB SERPL-MCNC: 0.17 MG/DL (ref 0.2–1)
BUN SERPL-MCNC: 34 MG/DL (ref 5–25)
CALCIUM SERPL-MCNC: 9.6 MG/DL (ref 8.3–10.1)
CHLORIDE SERPL-SCNC: 105 MMOL/L (ref 100–108)
CO2 SERPL-SCNC: 24 MMOL/L (ref 21–32)
CREAT SERPL-MCNC: 2.16 MG/DL (ref 0.6–1.3)
EOSINOPHIL # BLD AUTO: 0.06 THOUSAND/ΜL (ref 0–0.61)
EOSINOPHIL NFR BLD AUTO: 1 % (ref 0–6)
ERYTHROCYTE [DISTWIDTH] IN BLOOD BY AUTOMATED COUNT: 13.7 % (ref 11.6–15.1)
GFR SERPL CREATININE-BSD FRML MDRD: 27 ML/MIN/1.73SQ M
GLUCOSE SERPL-MCNC: 128 MG/DL (ref 65–140)
HCT VFR BLD AUTO: 39.4 % (ref 34.8–46.1)
HGB BLD-MCNC: 13.3 G/DL (ref 11.5–15.4)
LIPASE SERPL-CCNC: 346 U/L (ref 73–393)
LYMPHOCYTES # BLD AUTO: 1.55 THOUSANDS/ΜL (ref 0.6–4.47)
LYMPHOCYTES NFR BLD AUTO: 23 % (ref 14–44)
MCH RBC QN AUTO: 31 PG (ref 26.8–34.3)
MCHC RBC AUTO-ENTMCNC: 33.8 G/DL (ref 31.4–37.4)
MCV RBC AUTO: 92 FL (ref 82–98)
MONOCYTES # BLD AUTO: 0.62 THOUSAND/ΜL (ref 0.17–1.22)
MONOCYTES NFR BLD AUTO: 9 % (ref 4–12)
NEUTROPHILS # BLD AUTO: 4.42 THOUSANDS/ΜL (ref 1.85–7.62)
NEUTS SEG NFR BLD AUTO: 67 % (ref 43–75)
NRBC BLD AUTO-RTO: 0 /100 WBCS
PLATELET # BLD AUTO: 278 THOUSANDS/UL (ref 149–390)
PMV BLD AUTO: 10.3 FL (ref 8.9–12.7)
POTASSIUM SERPL-SCNC: 3.5 MMOL/L (ref 3.5–5.3)
PROT SERPL-MCNC: 8.4 G/DL (ref 6.4–8.2)
RBC # BLD AUTO: 4.29 MILLION/UL (ref 3.81–5.12)
SODIUM SERPL-SCNC: 141 MMOL/L (ref 136–145)
WBC # BLD AUTO: 6.66 THOUSAND/UL (ref 4.31–10.16)

## 2018-03-19 PROCEDURE — 83690 ASSAY OF LIPASE: CPT

## 2018-03-19 PROCEDURE — 96374 THER/PROPH/DIAG INJ IV PUSH: CPT

## 2018-03-19 PROCEDURE — 36415 COLL VENOUS BLD VENIPUNCTURE: CPT

## 2018-03-19 PROCEDURE — 80053 COMPREHEN METABOLIC PANEL: CPT

## 2018-03-19 PROCEDURE — 85025 COMPLETE CBC W/AUTO DIFF WBC: CPT

## 2018-03-19 PROCEDURE — 96361 HYDRATE IV INFUSION ADD-ON: CPT

## 2018-03-19 PROCEDURE — 99284 EMERGENCY DEPT VISIT MOD MDM: CPT

## 2018-03-19 PROCEDURE — 96375 TX/PRO/DX INJ NEW DRUG ADDON: CPT

## 2018-03-19 RX ORDER — MORPHINE SULFATE 4 MG/ML
4 INJECTION, SOLUTION INTRAMUSCULAR; INTRAVENOUS ONCE
Status: COMPLETED | OUTPATIENT
Start: 2018-03-19 | End: 2018-03-19

## 2018-03-19 RX ORDER — ONDANSETRON 2 MG/ML
4 INJECTION INTRAMUSCULAR; INTRAVENOUS ONCE
Status: COMPLETED | OUTPATIENT
Start: 2018-03-19 | End: 2018-03-19

## 2018-03-19 RX ORDER — ONDANSETRON 4 MG/1
4 TABLET, ORALLY DISINTEGRATING ORAL EVERY 8 HOURS PRN
Qty: 10 TABLET | Refills: 0 | Status: SHIPPED | OUTPATIENT
Start: 2018-03-19 | End: 2018-04-13 | Stop reason: SDUPTHER

## 2018-03-19 RX ADMIN — SODIUM CHLORIDE 1000 ML: 0.9 INJECTION, SOLUTION INTRAVENOUS at 20:12

## 2018-03-19 RX ADMIN — ONDANSETRON 4 MG: 2 INJECTION INTRAMUSCULAR; INTRAVENOUS at 20:12

## 2018-03-19 RX ADMIN — MORPHINE SULFATE 4 MG: 4 INJECTION INTRAVENOUS at 20:12

## 2018-03-20 DIAGNOSIS — F31.9 BIPOLAR 1 DISORDER (HCC): Primary | ICD-10-CM

## 2018-03-20 RX ORDER — DEXTROAMPHETAMINE SACCHARATE, AMPHETAMINE ASPARTATE MONOHYDRATE, DEXTROAMPHETAMINE SULFATE AND AMPHETAMINE SULFATE 5; 5; 5; 5 MG/1; MG/1; MG/1; MG/1
20 CAPSULE, EXTENDED RELEASE ORAL 2 TIMES DAILY
Qty: 60 CAPSULE | Refills: 0 | Status: SHIPPED | OUTPATIENT
Start: 2018-03-20 | End: 2018-04-30 | Stop reason: SDUPTHER

## 2018-03-20 NOTE — ED PROVIDER NOTES
History  Chief Complaint   Patient presents with    Abdominal Pain     left sided abdominal pain x 3 days  ptdeneis vomiting but states she has diahrrea  Pt states she has chronic pancreatitis and believes it is acting up again  Pt deneis fever/cp/sob/urinary complaints     51-year-old female with a history of hypertension, diabetes, chronic pain, pancreatitis, MPA presents to the emergency department with a 4 day history of sharp epigastric pain and nausea  The pain sometimes radiates to the left upper quadrant  She states it is similar to her pancreatitis pain in the past   She has had no fevers or chills  She has had a few episodes of diarrhea  She states currently she is uncertain what the cause of the pancreatitis is  She has seen Gastroenterology in the past   She states eating makes the pain worse  She denies alcohol use          History provided by:  Patient   used: No    Abdominal Pain   Pain location:  Epigastric and LUQ  Pain quality: sharp    Pain radiates to:  Does not radiate  Pain severity:  Unable to specify  Onset quality:  Gradual  Duration:  4 days  Timing:  Constant  Progression:  Waxing and waning  Chronicity:  Recurrent  Context: eating    Context: not alcohol use, not awakening from sleep, not diet changes, not medication withdrawal, not previous surgeries, not recent illness, not sick contacts, not suspicious food intake and not trauma    Relieved by:  None tried  Worsened by:  Eating  Ineffective treatments:  None tried  Associated symptoms: belching and nausea    Associated symptoms: no anorexia, no chest pain, no chills, no constipation, no cough, no diarrhea, no dysuria, no fatigue, no fever, no flatus, no hematemesis, no hematochezia, no hematuria, no melena, no shortness of breath, no sore throat and no vomiting    Risk factors: obesity    Risk factors: no alcohol abuse and no NSAID use        Prior to Admission Medications   Prescriptions Last Dose Informant Patient Reported? Taking?    Humidifiers (COOL MIST HUMIDIFIER 1 GALLON) MISC   No Yes   Sig: by Does not apply route as needed (shortness of breath)   Insulin Pen Needle (PEN NEEDLES) 31G X 8 MM MISC   Yes Yes   Sig: Inject 1 Stick under the skin 4 (four) times a day (with meals and at bedtime)   Lancets (FREESTYLE) lancets   No Yes   Sig: by Other route 3 (three) times a day   QUEtiapine (SEROquel) 50 mg tablet   No Yes   Sig: Take 1 tablet (50 mg total) by mouth daily at bedtime   amphetamine-dextroamphetamine (ADDERALL) 20 mg tablet   No Yes   Sig: Take 1 tablet (20 mg total) by mouth 2 (two) times a day Earliest Fill Date: 3/14/18 Max Daily Amount: 40 mg   cycloSPORINE (RESTASIS) 0 05 % ophthalmic emulsion   Yes Yes   Sig: Administer 1 drop to both eyes 2 (two) times a day   gabapentin (NEURONTIN) 300 mg capsule   Yes Yes   Sig: Take 1 capsule by mouth 3 (three) times a day   glucose blood (FREESTYLE LITE) test strip   No Yes   Si each by Other route 3 (three) times a day   insulin glargine (LANTUS SOLOSTAR) injection pen 100 units/mL   No Yes   Sig: Inject 0 22 mL (22 Units total) under the skin daily at bedtime   insulin lispro (HUMALOG KWIKPEN) 100 Units/mL SOPN   No Yes   Sig: Inject 8 Units under the skin 3 (three) times a day with meals   metoprolol succinate (TOPROL-XL) 25 mg 24 hr tablet   Yes Yes   Sig: Take 1 tablet by mouth daily   ondansetron (ZOFRAN) 4 mg tablet   Yes Yes   Sig: Take 4 mg by mouth every 8 (eight) hours as needed for nausea or vomiting   oxyCODONE-acetaminophen (PERCOCET) 7 5-325 MG per tablet   No Yes   Sig: Take 1 tablet by mouth every 6 (six) hours as needed (pain) Max Daily Amount: 4 tablets   sucralfate (CARAFATE) 1 g/10 mL suspension   Yes Yes   Sig: Take 10 mL by mouth 3 (three) times a day      Facility-Administered Medications: None       Past Medical History:   Diagnosis Date    Anemia of chronic disease     Anxiety     Asthma     Asthma     Chronic abdominal pain  CKD (chronic kidney disease) stage 3, GFR 30-59 ml/min     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[place] [address] )     Cushing syndrome ([place] [address] )     Diabetes mellitus ([place] [address] )     DVT (deep venous thrombosis) ([place] [address] )     History of acute pancreatitis     felt secondary to Bactrim    HTN (hypertension)     Hyperlipidemia     Hypertension     Microscopic polyangiitis (HCC)     MPA (microscopic polyangiitis) (HCC)     Renal disorder     Self-inflicted injury     self inflicted skin wounds    Wegener's granulomatosis with renal involvement ([place] [address] ) 2015       Past Surgical History:   Procedure Laterality Date    ESOPHAGOGASTRODUODENOSCOPY  09/11/2015    mild antral gastritis    RELEASE SCAR CONTRACTURE / GRAFT REPAIRS OF HAND         Family History   Problem Relation Age of Onset    Colon cancer Neg Hx     Drug abuse Neg Hx      mother father    Mental illness Neg Hx      disorder, mother father     I have reviewed and agree with the history as documented  Social History   Substance Use Topics    Smoking status: Former Smoker     Types: Cigarettes     Quit date: 2010    Smokeless tobacco: Never Used    Alcohol use No        Review of Systems   Constitutional: Negative  Negative for chills, diaphoresis, fatigue and fever  HENT: Negative  Negative for congestion, rhinorrhea and sore throat  Eyes: Negative  Negative for discharge, redness and itching  Respiratory: Negative  Negative for apnea, cough, chest tightness, shortness of breath and wheezing  Cardiovascular: Negative for chest pain, palpitations and leg swelling  Gastrointestinal: Positive for abdominal pain and nausea  Negative for abdominal distention, anorexia, blood in stool, constipation, diarrhea, flatus, hematemesis, hematochezia, melena and vomiting  Endocrine: Negative  Genitourinary: Negative  Negative for dysuria, flank pain, frequency, hematuria and urgency  Musculoskeletal: Negative  Negative for back pain  Skin: Negative  Allergic/Immunologic: Negative  Neurological: Negative  Negative for dizziness, syncope, weakness, light-headedness, numbness and headaches  Hematological: Negative  All other systems reviewed and are negative  Physical Exam  ED Triage Vitals [03/19/18 1838]   Temperature Pulse Respirations Blood Pressure SpO2   97 5 °F (36 4 °C) 84 16 (!) 172/81 95 %      Temp Source Heart Rate Source Patient Position - Orthostatic VS BP Location FiO2 (%)   Temporal Monitor Sitting Right arm --      Pain Score       7           Orthostatic Vital Signs  Vitals:    03/19/18 1838 03/19/18 2045   BP: (!) 172/81 157/88   Pulse: 84 68   Patient Position - Orthostatic VS: Sitting Lying       Physical Exam   Constitutional: She is oriented to person, place, and time  She appears well-developed and well-nourished  Non-toxic appearance  She does not have a sickly appearance  She does not appear ill  No distress  HENT:   Head: Normocephalic and atraumatic  Right Ear: External ear normal    Left Ear: External ear normal    Mouth/Throat: Oropharynx is clear and moist    Eyes: Conjunctivae are normal  Pupils are equal, round, and reactive to light  Right eye exhibits no discharge  Left eye exhibits no discharge  No scleral icterus  Cardiovascular: Normal rate, regular rhythm and normal heart sounds  Exam reveals no gallop and no friction rub  No murmur heard  Pulmonary/Chest: Effort normal and breath sounds normal  No respiratory distress  She has no wheezes  She has no rales  She exhibits no tenderness  Abdominal: Soft  Normal appearance and bowel sounds are normal  She exhibits no distension and no mass  There is tenderness in the epigastric area and left upper quadrant  There is no rebound and no guarding  No hernia  Obese   Musculoskeletal: Normal range of motion  She exhibits no edema, tenderness or deformity  Neurological: She is alert and oriented to person, place, and time  She has normal reflexes   She exhibits normal muscle tone  Skin: Skin is warm and dry  No rash noted  She is not diaphoretic  No erythema  No pallor  Psychiatric: She has a normal mood and affect  Nursing note and vitals reviewed  ED Medications  Medications   sodium chloride 0 9 % bolus 1,000 mL (1,000 mL Intravenous New Bag 3/19/18 2012)   ondansetron (ZOFRAN) injection 4 mg (4 mg Intravenous Given 3/19/18 2012)   morphine (PF) 4 mg/mL injection 4 mg (4 mg Intravenous Given 3/19/18 2012)       Diagnostic Studies  Results Reviewed     Procedure Component Value Units Date/Time    Comprehensive metabolic panel [[id number]]  (Abnormal) Collected:  03/19/18 1948    Lab Status:  Final result Specimen:  Blood from Arm, Right Updated:  03/19/18 2014     Sodium 141 mmol/L      Potassium 3 5 mmol/L      Chloride 105 mmol/L      CO2 24 mmol/L      Anion Gap 12 mmol/L      BUN 34 (H) mg/dL      Creatinine 2 16 (H) mg/dL      Glucose 128 mg/dL      Calcium 9 6 mg/dL      AST 21 U/L      ALT 25 U/L      Alkaline Phosphatase 150 (H) U/L      Total Protein 8 4 (H) g/dL      Albumin 3 9 g/dL      Total Bilirubin 0 17 (L) mg/dL      eGFR 27 ml/min/1 73sq m     Narrative:         National Kidney Disease Education Program recommendations are as follows:  GFR calculation is accurate only with a steady state creatinine  Chronic Kidney disease less than 60 ml/min/1 73 sq  meters  Kidney failure less than 15 ml/min/1 73 sq  meters      Lipase [[id number]]  (Normal) Collected:  03/19/18 1948    Lab Status:  Final result Specimen:  Blood from Arm, Right Updated:  03/19/18 2014     Lipase 346 u/L     CBC and differential [[id number]]  (Normal) Collected:  03/19/18 1948    Lab Status:  Final result Specimen:  Blood from Arm, Right Updated:  03/19/18 1957     WBC 6 66 Thousand/uL      RBC 4 29 Million/uL      Hemoglobin 13 3 g/dL      Hematocrit 39 4 %      MCV 92 fL      MCH 31 0 pg      MCHC 33 8 g/dL      RDW 13 7 %      MPV 10 3 fL      Platelets 264 Thousands/uL nRBC 0 /100 WBCs      Neutrophils Relative 67 %      Lymphocytes Relative 23 %      Monocytes Relative 9 %      Eosinophils Relative 1 %      Basophils Relative 0 %      Neutrophils Absolute 4 42 Thousands/µL      Lymphocytes Absolute 1 55 Thousands/µL      Monocytes Absolute 0 62 Thousand/µL      Eosinophils Absolute 0 06 Thousand/µL      Basophils Absolute 0 01 Thousands/µL                  No orders to display              Procedures  Procedures       Phone Contacts  ED Phone Contact    ED Course  ED Course as of Mar 19 2132   Mon Mar 19, 2018   2131 Patient feeling better  She was given the results of her lab tests  She states that her creatinine has been higher than that in the past   She does see a nephrologist                                MDM  Number of Diagnoses or Management Options  Diagnosis management comments: 54-year-old female presents to the emergency department with a 4 day history of epigastric and left upper quadrant pain that she describes as sharp  She states it is very similar to her previous episodes of pancreatitis  She had nausea without vomiting  No fevers or chills  On exam she does not appear in any acute distress  She does have mild tenderness in the epigastric and left upper quadrant without peritoneal signs  Will check CBC, CMP, lipase  Will give pain medication and nausea medication and reassess       Amount and/or Complexity of Data Reviewed  Clinical lab tests: ordered and reviewed      CritCare Time    Disposition  Final diagnoses:   Epigastric pain     Time reflects when diagnosis was documented in both MDM as applicable and the Disposition within this note     Time User Action Codes Description Comment    3/19/2018  9:31 PM Judith Zuniga Add [R10 13] Epigastric pain       ED Disposition     ED Disposition Condition Comment    Discharge  Josh Walls discharge to home/self care      Condition at discharge: Good        Follow-up Information     Follow up With Specialties Details Why [address], [place] Family Medicine Schedule an appointment as soon as possible for a visit in 2 days If symptoms worsen [address] [email or web address] [place]  [address]          Patient's Medications   Discharge Prescriptions    ONDANSETRON (ZOFRAN-ODT) 4 MG DISINTEGRATING TABLET    Take 1 tablet (4 mg total) by mouth every 8 (eight) hours as needed for nausea or vomiting       Start Date: 3/19/2018 End Date: --       Order Dose: 4 mg       Quantity: 10 tablet    Refills: 0     No discharge procedures on file      ED Provider  Electronically Signed by           Zac Spaulding DO  03/19/18 6011

## 2018-03-20 NOTE — DISCHARGE INSTRUCTIONS
Abdominal Pain   WHAT YOU NEED TO KNOW:   Abdominal pain can be dull, achy, or sharp  You may have pain in one area of your abdomen, or in your entire abdomen  Your pain may be caused by a condition such as constipation, food sensitivity or poisoning, infection, or a blockage  Abdominal pain can also be from a hernia, appendicitis, or an ulcer  Liver, gallbladder, or kidney conditions can also cause abdominal pain  The cause of your abdominal pain may be unknown  DISCHARGE INSTRUCTIONS:   Return to the emergency department if:   · You have new chest pain or shortness of breath  · You have pulsing pain in your upper abdomen or lower back that suddenly becomes constant  · Your pain is in the right lower abdominal area and worsens with movement  · You have a fever over 100 4°F (38°C) or shaking chills  · You are vomiting and cannot keep food or liquids down  · Your pain does not improve or gets worse over the next 8 to 12 hours  · You see blood in your vomit or bowel movements, or they look black and tarry  · Your skin or the whites of your eyes turn yellow  · You are a woman and have a large amount of vaginal bleeding that is not your monthly period  Contact your healthcare provider if:   · You have pain in your lower back  · You are a man and have pain in your testicles  · You have pain when you urinate  · You have questions or concerns about your condition or care  Follow up with your healthcare provider within 24 hours or as directed:  Write down your questions so you remember to ask them during your visits  Medicines:   · Medicines  may be given to calm your stomach and prevent vomiting or to decrease pain  Ask how to take pain medicine safely  · Take your medicine as directed  Contact your healthcare provider if you think your medicine is not helping or if you have side effects  Tell him of her if you are allergic to any medicine   Keep a list of the medicines, vitamins, and herbs you take  Include the amounts, and when and why you take them  Bring the list or the pill bottles to follow-up visits  Carry your medicine list with you in case of an emergency  © 2017 [address] [employer] Information is for End User's use only and may not be sold, redistributed or otherwise used for commercial purposes  All illustrations and images included in CareNotes® are the copyrighted property of [employer]  or [employer]  The above information is an  only  It is not intended as medical advice for individual conditions or treatments  Talk to your doctor, nurse or pharmacist before following any medical regimen to see if it is safe and effective for you

## 2018-03-22 DIAGNOSIS — M31.30 WEGENER'S GRANULOMATOSIS: Chronic | ICD-10-CM

## 2018-03-22 DIAGNOSIS — G89.4 CHRONIC PAIN SYNDROME: ICD-10-CM

## 2018-03-26 ENCOUNTER — HOSPITAL ENCOUNTER (EMERGENCY)
Facility: HOSPITAL | Age: 48
Discharge: HOME | End: 2018-03-26
Attending: EMERGENCY MEDICINE
Payer: COMMERCIAL

## 2018-03-26 VITALS
TEMPERATURE: 98.5 F | BODY MASS INDEX: 35.85 KG/M2 | WEIGHT: 196 LBS | OXYGEN SATURATION: 97 % | RESPIRATION RATE: 16 BRPM | DIASTOLIC BLOOD PRESSURE: 73 MMHG | SYSTOLIC BLOOD PRESSURE: 112 MMHG | HEART RATE: 86 BPM

## 2018-03-26 DIAGNOSIS — B02.29 POST HERPETIC NEURALGIA: ICD-10-CM

## 2018-03-26 DIAGNOSIS — A59.9 TRICHOMONIASIS: Primary | ICD-10-CM

## 2018-03-26 DIAGNOSIS — N39.0 UTI (URINARY TRACT INFECTION): ICD-10-CM

## 2018-03-26 LAB
BACTERIA UR QL AUTO: ABNORMAL /HPF
BILIRUB UR QL STRIP: NEGATIVE
CLARITY UR: CLEAR
COLOR UR: YELLOW
GLUCOSE UR STRIP-MCNC: NEGATIVE MG/DL
HGB UR QL STRIP.AUTO: ABNORMAL
KETONES UR STRIP-MCNC: NEGATIVE MG/DL
LEUKOCYTE ESTERASE UR QL STRIP: ABNORMAL
NITRITE UR QL STRIP: NEGATIVE
NON-SQ EPI CELLS URNS QL MICRO: ABNORMAL /HPF
OTHER STN SPEC: ABNORMAL
PH UR STRIP.AUTO: 5 [PH] (ref 4.5–8)
PROT UR STRIP-MCNC: >=300 MG/DL
RBC #/AREA URNS AUTO: ABNORMAL /HPF
SP GR UR STRIP.AUTO: 1.02 (ref 1–1.03)
UROBILINOGEN UR QL STRIP.AUTO: 0.2 E.U./DL
WBC #/AREA URNS AUTO: ABNORMAL /HPF

## 2018-03-26 PROCEDURE — 99284 EMERGENCY DEPT VISIT MOD MDM: CPT

## 2018-03-26 PROCEDURE — 81002 URINALYSIS NONAUTO W/O SCOPE: CPT | Performed by: EMERGENCY MEDICINE

## 2018-03-26 PROCEDURE — 81001 URINALYSIS AUTO W/SCOPE: CPT

## 2018-03-26 PROCEDURE — 87086 URINE CULTURE/COLONY COUNT: CPT

## 2018-03-26 RX ORDER — CEPHALEXIN 250 MG/1
500 CAPSULE ORAL EVERY 6 HOURS SCHEDULED
Qty: 56 CAPSULE | Refills: 0 | Status: SHIPPED | OUTPATIENT
Start: 2018-03-26 | End: 2018-04-02

## 2018-03-26 RX ORDER — METRONIDAZOLE 500 MG/1
500 TABLET ORAL EVERY 12 HOURS SCHEDULED
Qty: 14 TABLET | Refills: 0 | Status: SHIPPED | OUTPATIENT
Start: 2018-03-26 | End: 2018-04-02

## 2018-03-26 NOTE — ED PROVIDER NOTES
History  Chief Complaint   Patient presents with    Abdominal Pain     pt c/o abdominal pain, hx of similar pain but now c/o pain to buttocks as well  pt also c/o diarrhea   states hurts to wipe buttocks as well   now c/o increased vaginal discharge, needing to change underwear 2-3 times per day  pt arrives with list of issues that have been going on      58-year-old female with history of chronic pain, hypertension, diabetes, MPA presents to the emergency department complaining of ongoing pelvic pain since 2015 since having the shingles  She also complains of some numbness to the right anus area and buttock area since having the shingles  Over the last 4 to 5 days she has noticed large amounts of watery vaginal discharge  She also has noticed some spotting  She states that she has been menopausal for the last 3 years  She also states that she has not been sexually active for years  She states the vaginal discharge does not have an odor or is itchy  She was treated presumptively for a yeast infection last week  She is due to see her OBGYN tomorrow  She has had no fevers or chills, nausea or vomiting  She states she has been taking her Percocet without relief          History provided by:  Patient   used: No    Vaginal Discharge   Quality:  Watery and white  Severity:  Unable to specify  Onset quality:  Gradual  Duration:  4 days  Timing:  Constant  Progression:  Unchanged  Chronicity:  New  Context: spontaneously    Context: not during intercourse, not genital trauma and not recent antibiotic use    Relieved by:  None tried  Worsened by:  Nothing  Ineffective treatments:  None tried  Associated symptoms: no abdominal pain, no dyspareunia, no dysuria, no fever, no genital lesions, no nausea, no rash, no urinary frequency, no urinary hesitancy, no urinary incontinence, no vaginal itching and no vomiting    Risk factors: no gynecological surgery, no immunosuppression, no new sexual partner, no PID, no STI, no STI exposure and no unprotected sex        Prior to Admission Medications   Prescriptions Last Dose Informant Patient Reported? Taking?    Humidifiers (COOL MIST HUMIDIFIER 1 GALLON) MISC   No No   Sig: by Does not apply route as needed (shortness of breath)   Insulin Pen Needle (PEN NEEDLES) 31G X 8 MM MISC   Yes No   Sig: Inject 1 Stick under the skin 4 (four) times a day (with meals and at bedtime)   Lancets (FREESTYLE) lancets   No No   Sig: by Other route 3 (three) times a day   QUEtiapine (SEROquel) 50 mg tablet   No No   Sig: Take 1 tablet (50 mg total) by mouth daily at bedtime   amphetamine-dextroamphetamine (ADDERALL XR) 20 MG 24 hr capsule   No No   Sig: Take 1 capsule (20 mg total) by mouth 2 (two) times a day Max Daily Amount: 40 mg   cycloSPORINE (RESTASIS) 0 05 % ophthalmic emulsion   Yes No   Sig: Administer 1 drop to both eyes 2 (two) times a day   gabapentin (NEURONTIN) 300 mg capsule   Yes No   Sig: Take 1 capsule by mouth 3 (three) times a day   glucose blood (FREESTYLE LITE) test strip   No No   Si each by Other route 3 (three) times a day   insulin glargine (LANTUS SOLOSTAR) injection pen 100 units/mL   No No   Sig: Inject 0 22 mL (22 Units total) under the skin daily at bedtime   insulin lispro (HUMALOG KWIKPEN) 100 Units/mL SOPN   No No   Sig: Inject 8 Units under the skin 3 (three) times a day with meals   metoprolol succinate (TOPROL-XL) 25 mg 24 hr tablet   Yes No   Sig: Take 1 tablet by mouth daily   ondansetron (ZOFRAN) 4 mg tablet   Yes No   Sig: Take 4 mg by mouth every 8 (eight) hours as needed for nausea or vomiting   ondansetron (ZOFRAN-ODT) 4 mg disintegrating tablet   No No   Sig: Take 1 tablet (4 mg total) by mouth every 8 (eight) hours as needed for nausea or vomiting   oxyCODONE-acetaminophen (PERCOCET) 7 5-325 MG per tablet   No No   Sig: Take 1 tablet by mouth every 6 (six) hours as needed (pain) Max Daily Amount: 4 tablets   sucralfate (CARAFATE) 1 g/10 mL suspension   Yes No   Sig: Take 10 mL by mouth 3 (three) times a day      Facility-Administered Medications: None       Past Medical History:   Diagnosis Date    Anemia of chronic disease     Anxiety     Asthma     Asthma     Chronic abdominal pain     CKD (chronic kidney disease) stage 3, GFR 30-59 ml/min     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[place] [address] )     Cushing syndrome ([place] [address] )     Diabetes mellitus ([place] [address] )     DVT (deep venous thrombosis) ([place] [address] )     History of acute pancreatitis     felt secondary to Bactrim    HTN (hypertension)     Hyperlipidemia     Hypertension     Microscopic polyangiitis (HCC)     MPA (microscopic polyangiitis) (HCC)     Renal disorder     Self-inflicted injury     self inflicted skin wounds    Wegener's granulomatosis with renal involvement ([place] [address] ) 2015       Past Surgical History:   Procedure Laterality Date    ESOPHAGOGASTRODUODENOSCOPY  09/11/2015    mild antral gastritis    RELEASE SCAR CONTRACTURE / GRAFT REPAIRS OF HAND         Family History   Problem Relation Age of Onset    Colon cancer Neg Hx     Drug abuse Neg Hx      mother father    Mental illness Neg Hx      disorder, mother father     I have reviewed and agree with the history as documented  Social History   Substance Use Topics    Smoking status: Former Smoker     Types: Cigarettes     Quit date: 2010    Smokeless tobacco: Never Used    Alcohol use No        Review of Systems   Constitutional: Negative  Negative for chills, diaphoresis, fatigue and fever  HENT: Negative  Negative for congestion, rhinorrhea and sore throat  Eyes: Negative  Negative for discharge, redness and itching  Respiratory: Negative  Negative for apnea, cough, chest tightness, shortness of breath and wheezing  Cardiovascular: Negative for chest pain, palpitations and leg swelling  Gastrointestinal: Negative  Negative for abdominal pain, nausea and vomiting  Endocrine: Negative      Genitourinary: Positive for pelvic pain, vaginal bleeding and vaginal discharge  Negative for bladder incontinence, dyspareunia, dysuria, flank pain, frequency, genital sores, hematuria, hesitancy, urgency and vaginal pain  Musculoskeletal: Negative  Negative for back pain  Skin: Negative  Allergic/Immunologic: Negative  Neurological: Negative  Negative for dizziness, syncope, weakness, light-headedness, numbness and headaches  Hematological: Negative  All other systems reviewed and are negative  Physical Exam  ED Triage Vitals   Temperature Pulse Respirations Blood Pressure SpO2   03/26/18 1340 03/26/18 1340 03/26/18 1340 03/26/18 1340 03/26/18 1340   98 5 °F (36 9 °C) (!) 106 18 135/86 98 %      Temp Source Heart Rate Source Patient Position - Orthostatic VS BP Location FiO2 (%)   03/26/18 1340 03/26/18 1552 03/26/18 1552 03/26/18 1552 --   Oral Monitor Lying Left arm       Pain Score       03/26/18 1340       8           Orthostatic Vital Signs  Vitals:    03/26/18 1340 03/26/18 1552   BP: 135/86 112/73   Pulse: (!) 106 86   Patient Position - Orthostatic VS:  Lying       Physical Exam   Constitutional: She is oriented to person, place, and time  She appears well-developed and well-nourished  Non-toxic appearance  She does not have a sickly appearance  She does not appear ill  No distress  HENT:   Head: Normocephalic and atraumatic  Right Ear: External ear normal    Left Ear: External ear normal    Mouth/Throat: Oropharynx is clear and moist    Eyes: Conjunctivae are normal  Pupils are equal, round, and reactive to light  Right eye exhibits no discharge  Left eye exhibits no discharge  No scleral icterus  Cardiovascular: Normal rate, regular rhythm and normal heart sounds  Exam reveals no gallop and no friction rub  No murmur heard  Pulmonary/Chest: Effort normal and breath sounds normal  No respiratory distress  She has no wheezes  She has no rales  She exhibits no tenderness  Abdominal: Soft   Bowel sounds are normal  She exhibits no distension and no mass  There is no tenderness  No hernia  obese   Genitourinary: There is no rash or lesion on the right labia  There is no rash or lesion on the left labia  Cervix exhibits no motion tenderness  Vaginal discharge (Copious, white, watery) found  Neurological: She is alert and oriented to person, place, and time  She has normal reflexes  She exhibits normal muscle tone  Skin: Skin is warm and dry  No rash noted  She is not diaphoretic  No erythema  No pallor  Psychiatric: She has a normal mood and affect  Nursing note and vitals reviewed  ED Medications  Medications - No data to display    Diagnostic Studies  Results Reviewed     Procedure Component Value Units Date/Time    POCT urinalysis dipstick [[id number]]  (Abnormal) Resulted:  03/26/18 1534    Lab Status:  Final result Specimen:  Urine Updated:  03/26/18 1625    Urine Microscopic [[id number]]  (Abnormal) Collected:  03/26/18 1534    Lab Status:  Final result Specimen:  Urine from Urine, Clean Catch Updated:  03/26/18 1556     RBC, UA 4-10 (A) /hpf      WBC, UA Innumerable (A) /hpf      Epithelial Cells Occasional /hpf      Bacteria, UA Occasional /hpf      OTHER OBSERVATIONS Trichomonas Organisms Present    Urine culture [[id number]] Collected:  03/26/18 1534    Lab Status:   In process Specimen:  Urine from Urine, Clean Catch Updated:  03/26/18 1556    ED Urine Macroscopic [[id number]]  (Abnormal) Collected:  03/26/18 1534    Lab Status:  Final result Specimen:  Urine Updated:  03/26/18 1535     Color, UA Yellow     Clarity, UA Clear     pH, UA 5 0     Leukocytes, UA Large (A)     Nitrite, UA Negative     Protein, UA >=300 (A) mg/dl      Glucose, UA Negative mg/dl      Ketones, UA Negative mg/dl      Urobilinogen, UA 0 2 E U /dl      Bilirubin, UA Negative     Blood, UA Moderate (A)     Specific [place], UA 1 025    Narrative:       CLINITEK RESULT    Chlamydia/GC amplified DNA by PCR [[id number]] Lab Status:  No result Specimen:  Urine from Urine, Other                  No orders to display              Procedures  Procedures       Phone Contacts  ED Phone Contact    ED Course  ED Course as of Mar 26 1629   Mon Mar 26, 2018   1621 Lab unable to run [facility] and Chlamydia secondary to not enough urine  The urine does show Trichomonas and the plan was to treat with Flagyl anyway  Will also add Keflex secondary to UTI                                MDM  Number of Diagnoses or Management Options  Diagnosis management comments: 49-year-old female presents with a 3 year history of pelvic pain and some numbness along the buttock area after having shingles  She has noticed for the last 4 to 5 days that she has had some intermittent spotting along with copious white and watery vaginal discharge  She states she is not sexually active  She does have an exam with her OBGYN tomorrow  On exam she is not in any distress her abdomen is completely soft and nontender  External genitalia exam does reveal a copious amount of watery white discharge which may be more of a bacterial vaginosis  Highly doubt ST I but will send urine for GC Chlamydia  Patient was encouraged to follow up with her OBGYN tomorrow as postmenopausal bleeding he needs to be urgently worked up by her gyn         Amount and/or Complexity of Data Reviewed  Independent visualization of images, tracings, or specimens: yes      CritCare Time    Disposition  Final diagnoses:   Trichomoniasis   UTI (urinary tract infection)   Post herpetic neuralgia     Time reflects when diagnosis was documented in both MDM as applicable and the Disposition within this note     Time User Action Codes Description Comment    3/26/2018  4:23 PM Jesús Rodriguez Add [A59 9] Trichomoniasis     3/26/2018  4:23 PM Gianluca ARCHULETA Add [N39 0] UTI (urinary tract infection)     3/26/2018  4:23 PM Gianluca ARCHULETA Add [B02 29] Post herpetic neuralgia       ED Disposition     ED Disposition Condition Comment    Discharge  Tasneem Do discharge to home/self care  Condition at discharge: Good        Follow-up Information     Follow up With Specialties Details Why Contact Info       Keep your appointment with your OBGYN tomorrow         Patient's Medications   Discharge Prescriptions    CEPHALEXIN (KEFLEX) 250 MG CAPSULE    Take 2 capsules (500 mg total) by mouth every 6 (six) hours for 7 days       Start Date: 3/26/2018 End Date: 4/2/2018       Order Dose: 500 mg       Quantity: 56 capsule    Refills: 0    METRONIDAZOLE (FLAGYL) 500 MG TABLET    Take 1 tablet (500 mg total) by mouth every 12 (twelve) hours for 7 days       Start Date: 3/26/2018 End Date: 4/2/2018       Order Dose: 500 mg       Quantity: 14 tablet    Refills: 0     No discharge procedures on file      ED Provider  Electronically Signed by           Lizandro Lopes DO  03/26/18 8078

## 2018-03-26 NOTE — ED NOTES
Spoke to lab who states that there wasn't enough of a urine sample to complete the [facility] testing   Dr Bhavna Wynne made aware      Sarah Cruz, RN  03/26/18 4313

## 2018-03-26 NOTE — ED NOTES
Patient asking to speak with her doctor about her results   Dr Rowena Chase made aware      José Abebe RN  03/26/18 8506

## 2018-03-26 NOTE — ED NOTES
RN approached patient to ask if patient can urinate due to waiting on urine sample  Patient states, "I always ask before I go to the bathroom and everyone always says they don't need a sample  I literally just got back from the bathroom  I mean I'll try to go now but this is ridiculous  I can't be waiting hours until I can pee again " Patient use personal scooter to get herself to restroom  Patient attempting urine sample at this time        Clotilde Hwang RN  03/26/18 0497

## 2018-03-26 NOTE — DISCHARGE INSTRUCTIONS
Trichomoniasis   WHAT YOU NEED TO KNOW:   Trichomoniasis is a common sexually transmitted infection (STI)  It is spread between people during sex or genital contact  Trichomoniasis is caused by tiny parasites that are too small to be seen  DISCHARGE INSTRUCTIONS:   Medicines:   · Antibiotics:  Always take your antibiotics exactly as ordered by your healthcare provider  Keep taking this medicine until it is completely gone, even if you feel better  If you stop taking antibiotics before they are gone, they may not completely cure your infection  Never save antibiotics or take leftover antibiotics that were given to you for another illness  Do not drink contain alcohol while you use antibiotic medicine to treat trichomoniasis  It may make you sick  Wait at least 3 days after your last dose of medicine before you drink alcohol again  Also avoid over-the-counter medicines that contain alcohol, such as certain cough or cold medicines  · Over-the-counter pain medicine: You may use over-the-counter (OTC) pain medicines, such as ibuprofen or acetaminophen, for pain or swelling  These medicines may be bought without a caregiver's order  These medicines are safe for most people to use  However, they can cause serious problems when they are not used correctly  People with certain medical conditions, or using certain other medicines are at a higher risk for problems  Using too much, or using these medicines for longer than the label says can also cause problems  Follow directions on the label carefully  If you have questions, talk to your caregiver  · Take your medicine as directed  Contact your healthcare provider if you think your medicine is not helping or if you have side effects  Tell him of her if you are allergic to any medicine  Keep a list of the medicines, vitamins, and herbs you take  Include the amounts, and when and why you take them  Bring the list or the pill bottles to follow-up visits   Carry your medicine list with you in case of an emergency  Self care:   · Sex:  Tell your sexual partners that you have this infection  They may also be infected and need treatment  Do not have sex until both you and your partner are done with treatment and all symptoms are gone  · Bathing and handwashing:  Wash your hands thoroughly with soap and warm water after going to the bathroom  This helps keep your infection from spreading to other parts of your body, such as your eyes  Keep your genital area clean and dry  Take showers instead of tub baths and use plain, unscented soap  · Advice for women:  Do not douche during treatment unless your healthcare provider tells you to  Do not use feminine hygiene sprays or powders  Prevent trichomoniasis:  You can get trichomoniasis more than once  Limit the number of sexual partners you have to decrease your risk for another infection  Do not have unprotected sex (including oral sex)  Always wear a latex condom during sex to prevent trichomoniasis and other STIs  Use a new condom after each ejaculation  For more information:   · Division of STD Prevention, Centers for Disease Control and Prevention  [place] [address] [facility] , [address] [place] [address] Drive  Phone: 9- 134 - [phone]  Web Address: [employer] au  · [address] Astrid Murray (CRISTY)  P O  [address] [facility] , [address] [place] [address]  Web Address: [email or web address]  org  Contact your healthcare provider if:   · Your symptoms become worse, or come back after treatment  · You have unusual vaginal bleeding  · You have any problems that may be caused by the medicine you are taking  · You have questions or concerns about your condition or care  © 2017 [facility] [facility] Information is for End User's use only and may not be sold, redistributed or otherwise used for commercial purposes   All illustrations and images included in CareNotes® are the copyrighted property of [employer]  or [employer]  The above information is an  only  It is not intended as medical advice for individual conditions or treatments  Talk to your doctor, nurse or pharmacist before following any medical regimen to see if it is safe and effective for you  Urinary Tract Infection in Women   WHAT YOU NEED TO KNOW:   A urinary tract infection (UTI) is caused by bacteria that get inside your urinary tract  Most bacteria that enter your urinary tract come out when you urinate  If the bacteria stay in your urinary tract, you may get an infection  Your urinary tract includes your kidneys, ureters, bladder, and urethra  Urine is made in your kidneys, and it flows from the ureters to the bladder  Urine leaves the bladder through the urethra  A UTI is more common in your lower urinary tract, which includes your bladder and urethra  DISCHARGE INSTRUCTIONS:   Return to the emergency department if:   · You are urinating very little or not at all  · You have a high fever with shaking chills  · You have side or back pain that gets worse  Contact your healthcare provider if:   · You have a fever  · You do not feel better after 2 days of taking antibiotics  · You are vomiting  · You have questions or concerns about your condition or care  Medicines:   · Antibiotics  help fight a bacterial infection  · Medicines  may be given to decrease pain and burning when you urinate  They will also help decrease the feeling that you need to urinate often  These medicines will make your urine orange or red  · Take your medicine as directed  Contact your healthcare provider if you think your medicine is not helping or if you have side effects  Tell him or her if you are allergic to any medicine  Keep a list of the medicines, vitamins, and herbs you take  Include the amounts, and when and why you take them  Bring the list or the pill bottles to follow-up visits   Carry your medicine list with you in case of an emergency  Follow up with your healthcare provider as directed:  Write down your questions so you remember to ask them during your visits  Prevent another UTI:   · Empty your bladder often  Urinate and empty your bladder as soon as you feel the need  Do not hold your urine for long periods of time  · Wipe from front to back after you urinate or have a bowel movement  This will help prevent germs from getting into your urinary tract through your urethra  · Drink liquids as directed  Ask how much liquid to drink each day and which liquids are best for you  You may need to drink more liquids than usual to help flush out the bacteria  Do not drink alcohol, caffeine, or citrus juices  These can irritate your bladder and increase your symptoms  Your healthcare provider may recommend cranberry juice to help prevent a UTI  · Urinate after you have sex  This can help flush out bacteria passed during sex  · Do not douche or use feminine deodorants  These can change the chemical balance in your vagina  · Change sanitary pads or tampons often  This will help prevent germs from getting into your urinary tract  · Do pelvic muscle exercises often  Pelvic muscle exercises may help you start and stop urinating  Strong pelvic muscles may help you empty your bladder easier  Squeeze these muscles tightly for 5 seconds like you are trying to hold back urine  Then relax for 5 seconds  Gradually work up to squeezing for 10 seconds  Do 3 sets of 15 repetitions a day, or as directed  © 2017 [address] [employer] Information is for End User's use only and may not be sold, redistributed or otherwise used for commercial purposes  All illustrations and images included in CareNotes® are the copyrighted property of A D A M , Inc  or Gian Matias  The above information is an  only  It is not intended as medical advice for individual conditions or treatments   Talk to your doctor, nurse or pharmacist before following any medical regimen to see if it is safe and effective for you

## 2018-03-27 ENCOUNTER — OFFICE VISIT (OUTPATIENT)
Dept: OBGYN CLINIC | Facility: CLINIC | Age: 48
End: 2018-03-27
Payer: COMMERCIAL

## 2018-03-27 VITALS
BODY MASS INDEX: 36.82 KG/M2 | WEIGHT: 195 LBS | TEMPERATURE: 120 F | HEIGHT: 61 IN | SYSTOLIC BLOOD PRESSURE: 156 MMHG | DIASTOLIC BLOOD PRESSURE: 100 MMHG

## 2018-03-27 DIAGNOSIS — R10.2 PELVIC PAIN: Primary | ICD-10-CM

## 2018-03-27 LAB — BACTERIA UR CULT: NORMAL

## 2018-03-27 PROCEDURE — 99214 OFFICE O/P EST MOD 30 MIN: CPT | Performed by: OBSTETRICS & GYNECOLOGY

## 2018-03-27 RX ORDER — OXYCODONE AND ACETAMINOPHEN 7.5; 325 MG/1; MG/1
1 TABLET ORAL EVERY 6 HOURS PRN
Qty: 120 TABLET | Refills: 0 | Status: SHIPPED | OUTPATIENT
Start: 2018-03-27 | End: 2018-04-30 | Stop reason: SDUPTHER

## 2018-03-27 NOTE — PROGRESS NOTES
Subjective:   Subjective       Manoj Aleman is a 52 y o  female who presents for evaluation of pelvic and abdominal pain  The pain started in 2015, following an episode of shingles  Around this time, she noted groin pain with movement  She has been undergoing manipulation for her groin and pelvic pain since 2015, which she reports was initially very helpful but lately has provided minimal relief  She also notes dyspareunia and vaginal dryness  She then reports the pelvic pain starting approximately 2 years ago, described as sharp and constant, with complete resolution of her pain first thing in the morning with gradual progression to unbearable pain in the evenings  Approximately 1 year ago, she noted the onset of left flank pain  The pain is described as sharp, shooting and constant, and is 8-10/10 in intensity  Pain is located in the bilateral pelvis very laterally,with radiation to the groin  Aggravating factors: activity, sitting up and standing  She is unable to stay in a single same position for more than a few minutes at a time  Alleviating factors: Frequent movement  Associated symptoms: constipation and diarrhea  The patient denies chills, fever, nausea and vomiting  Her initial appointment was made due to a history of intermittent spotting starting 1 5 months ago  She reports small amounts of pink or brownish discharge, "like the end of a menstrual cycle" with wiping  She was evaluated in the emergency department yesterday and was found to have both a urinary tract infection and bacterial vaginosis, for which she has received antibiotic therapy  She started her antibiotics yesterday  LMP August of 2015 with no episodes of bleeding until the episode described above  She does report occasional hot flashes, vaginal dryness, and sleep interruptions but notes the sleep interruptions are usually due to frequent urination  No previous pregnancies  No previous abdominal surgeries      Menstrual History:  OB History     No data available           The following portions of the patient's history were reviewed and updated as appropriate: allergies, current medications, past family history, past medical history, past social history, past surgical history and problem list     Review of Systems  Pertinent items are noted in HPI  Objective     /100   Temp (!) 120 °F (48 9 °C)   Ht 5' 1" (1 549 m)   Wt 88 5 kg (195 lb)   LMP  (LMP Unknown)   BMI 36 84 kg/m²   Gen: AaOx3, pleasant, very uncomfortable if sitting in single position for more than a few minutes at a time  Frequent position changes, frequent grimacing  Pulm: No increased work of breathing  Abd: Soft, nondistended  Tender to palpation in the suprapubic region and very laterally in the bilateral lower quadrants  : Attempted speculum examination; pt did not tolerate after two attempts with copious amounts of lubrication, deep breathing, distraction  Attempted bimanual examination; pt extremely uncomfortable so examination stopped  Extremities: No edema, nontender, Negative Ray's bilaterally      Assessment/Plan  Lucero Grimaldo is a 53yo G0 presenting with pelvic and abdominal pain  Recently diagnosed with urinary tract infection and bacterial vaginosis    1  Pelvic and abdominal pain: Pt did not tolerate speculum or bimanual examination today  Will sent for ultrasound to evaluate for structural causes of her pain  Discussed utility of CT scan with attending provider, but given pt's chronic kidney disease, decision was made to proceed first with ultrasound to avoid contrast  Discussed pelvic floor physical therapy with pt; pt agreeable  Pt given referral for physical therapy  Discussed with patient that imaging may not reveal a cause of her pain, and that her pain appears to be neurological or musculoskeletal in origin rather than GYN  Pt to return in 2 weeks to discuss results of ultrasound  2  Recent urinary tract infection:  On Day 2 of 7 of Keflex antibiotic therapy  May have contributed to vaginal bleeding  3  Recent bacterial vaginosis infection: On Day 2 of 7 of Flagyl therapy  May have contributed to vaginal bleeding  4  Postmenopausal symptoms: Pt may be a candidate for hormone replacement therapy   Did not have opportunity to discuss in detail with patient today      Bryce Keane DO  OB/GYN, PGY2  3/27/2018, 2:26 PM

## 2018-03-27 NOTE — PATIENT INSTRUCTIONS
Pelvic Pain in Women   WHAT YOU NEED TO KNOW:   What do I need to know about pelvic pain? You may have pain on one or both sides of your pelvis  Pelvic pain may occur with certain body positions or activities, such as when you have sex or a bowel movement  It may worsen during your monthly period or after you sit or stand for a long time  Chronic pelvic pain is pain that continues for longer than 6 months  What causes pelvic pain in women? · Gynecologic conditions , such as pelvic inflammatory disease (PID), endometriosis, or uterine fibroids    · Bowel and bladder conditions , such as irritable bowel syndrome, bladder inflammation, or tumors     · Muscle and nerve conditions , such as swelling or weakness of your pelvic muscles, or damage to the nerves of your pelvic area (neuropathy)    · Psychological issues as a result of physical or sexual abuse, or drug abuse  How is pelvic pain treated? · Pain medicine  may be given in pills, injections, or creams to relieve your pain  · Hormones  may be given if your pain gets worse with your menstrual cycle  · Antibiotics  may be given if your pain is caused by infection  · Surgery  may be done if other treatments do not relieve your pain  How can I manage my pelvic pain? · Keep a pain diary  Write down when your pain happens, how severe it is, and any other symptoms you have with your pain  A diary will help you keep track of pain cycles  It may also help your healthcare provider find out what is causing your pain  · Learn ways to relax  Deep breathing, meditation, and relaxation techniques can help decrease your pain  When you are tense, your pain may increase  · Change the foods you eat if you have irritable bowel syndrome  Ask your healthcare provider about the best foods for you  Call 911 for any of the following:   · You have severe chest pain and sudden trouble breathing  When should I seek immediate care?    · You have heavy or unusual vaginal bleeding, and you feel lightheaded or faint  When should I contact my healthcare provider? · You have pelvic pain that does not go away after you take pain medicine  · You develop new symptoms or your symptoms are worse than before  · You have questions or concerns about your condition or care  CARE AGREEMENT:   You have the right to help plan your care  Learn about your health condition and how it may be treated  Discuss treatment options with your caregivers to decide what care you want to receive  You always have the right to refuse treatment  The above information is an  only  It is not intended as medical advice for individual conditions or treatments  Talk to your doctor, nurse or pharmacist before following any medical regimen to see if it is safe and effective for you  © 2017 [facility] Information is for End User's use only and may not be sold, redistributed or otherwise used for commercial purposes  All illustrations and images included in CareNotes® are the copyrighted property of A D A M , Inc  or [employer] Mat[employer]

## 2018-03-28 ENCOUNTER — TELEPHONE (OUTPATIENT)
Dept: GASTROENTEROLOGY | Facility: CLINIC | Age: 48
End: 2018-03-28

## 2018-03-28 ENCOUNTER — OFFICE VISIT (OUTPATIENT)
Dept: GASTROENTEROLOGY | Facility: MEDICAL CENTER | Age: 48
End: 2018-03-28
Payer: COMMERCIAL

## 2018-03-28 VITALS
HEIGHT: 61 IN | DIASTOLIC BLOOD PRESSURE: 80 MMHG | HEART RATE: 79 BPM | WEIGHT: 200 LBS | TEMPERATURE: 98.3 F | SYSTOLIC BLOOD PRESSURE: 132 MMHG | BODY MASS INDEX: 37.76 KG/M2

## 2018-03-28 DIAGNOSIS — M54.5 LEFT LOW BACK PAIN, UNSPECIFIED CHRONICITY, WITH SCIATICA PRESENCE UNSPECIFIED: ICD-10-CM

## 2018-03-28 DIAGNOSIS — K86.1 CHRONIC PANCREATITIS, UNSPECIFIED PANCREATITIS TYPE (HCC): ICD-10-CM

## 2018-03-28 DIAGNOSIS — M31.7 MPA (MICROSCOPIC POLYANGIITIS) (HCC): Chronic | ICD-10-CM

## 2018-03-28 DIAGNOSIS — G89.29 CHRONIC LEFT-SIDED LOW BACK PAIN, WITH SCIATICA PRESENCE UNSPECIFIED: Primary | ICD-10-CM

## 2018-03-28 DIAGNOSIS — M54.5 CHRONIC LEFT-SIDED LOW BACK PAIN, WITH SCIATICA PRESENCE UNSPECIFIED: Primary | ICD-10-CM

## 2018-03-28 DIAGNOSIS — R10.13 EPIGASTRIC PAIN: Primary | ICD-10-CM

## 2018-03-28 DIAGNOSIS — K85.90 ACUTE PANCREATITIS, UNSPECIFIED COMPLICATION STATUS, UNSPECIFIED PANCREATITIS TYPE: ICD-10-CM

## 2018-03-28 DIAGNOSIS — IMO0001 IDDM (INSULIN DEPENDENT DIABETES MELLITUS): Primary | Chronic | ICD-10-CM

## 2018-03-28 DIAGNOSIS — R19.7 DIARRHEA OF PRESUMED INFECTIOUS ORIGIN: ICD-10-CM

## 2018-03-28 PROCEDURE — 99214 OFFICE O/P EST MOD 30 MIN: CPT | Performed by: INTERNAL MEDICINE

## 2018-03-28 RX ORDER — LIDOCAINE 50 MG/G
OINTMENT TOPICAL 2 TIMES DAILY PRN
Qty: 35.44 G | Refills: 0 | Status: ON HOLD | OUTPATIENT
Start: 2018-03-28 | End: 2018-12-14

## 2018-03-28 RX ORDER — LIDOCAINE 50 MG/G
1 PATCH TOPICAL DAILY
Qty: 30 PATCH | Refills: 0 | Status: SHIPPED | OUTPATIENT
Start: 2018-03-28 | End: 2018-03-28 | Stop reason: ALTCHOICE

## 2018-03-28 RX ORDER — PEN NEEDLE, DIABETIC 30 GX3/16"
1 NEEDLE, DISPOSABLE MISCELLANEOUS
Qty: 100 EACH | Refills: 6 | Status: SHIPPED | OUTPATIENT
Start: 2018-03-28 | End: 2022-03-01 | Stop reason: SDUPTHER

## 2018-03-28 RX ORDER — LIDOCAINE 50 MG/G
OINTMENT TOPICAL 3 TIMES DAILY PRN
Qty: 35.44 G | Refills: 0 | Status: SHIPPED | OUTPATIENT
Start: 2018-03-28 | End: 2018-03-28 | Stop reason: SDUPTHER

## 2018-03-28 NOTE — TELEPHONE ENCOUNTER
Dr Titus Hernandez pt    [facility] called in to advise that Lidocaine patches are not covered by ins but the ointment is  Please advise if you would like to change to the ointment   [phone]

## 2018-03-28 NOTE — PROGRESS NOTES
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  Needs further evaluation with EGD to rule out peptic ulcer disease  Symptoms seem to be worse with oral intake  She has had decreased appetite and decreased intake possibly secondary to this  She is also on several medications including antipsychotic medication which may be causing her symptoms to worsen  As her symptoms may be related to medications  - Celiac Disease Antibody Profile; Future  - Lipase; Future  - Case request operating room: EGD AND COLONOSCOPY; Standing  - polyethylene glycol (GOLYTELY) 4000 mL solution; Take 4,000 mL by mouth once for 1 dose  Dispense: 4000 mL; Refill: 0  - Case request operating room: EGD AND COLONOSCOPY    2  Diarrhea of presumed infectious origin-new onset over the last 2 weeks  She has previous history of constipation  Stool studies for further evaluation  No sick contacts, no travel  Will rule out infectious etiology versus postinfectious IBS  Further evaluation based on stool studies  Colonoscopy planned regardless for evaluation due to chronic abdominal pain  - Stool Enteric Bacterial Panel by PCR; Future  - Celiac Disease Antibody Profile; Future  - Clostridium difficile toxin by PCR; Future  - Giardia antigen; Future  - Ova and parasite examination; Future  - Calprotectin,Fecal; Future  - Case request operating room: EGD AND COLONOSCOPY; Standing  - polyethylene glycol (GOLYTELY) 4000 mL solution;  Take 4,000 mL by mouth once for 1 dose  Dispense: 4000 mL; Refill: 0  - Case request operating room: EGD AND COLONOSCOPY    3  Acute pancreatitis, unspecified complication status, unspecified pancreatitis type-  She was previously on Imuran which was discontinued approximately over a year ago  She has acute on chronic pancreatitis  This may be related to an autoimmune process  Will rule out autoimmune and hyperlipidemia  Right upper quadrant ultrasound done in December of 2017 was negative for stones  We can work her up for cystic fibrosis gene mutation, SPINK and PSRR-1 in the near future if evaluation bili was negative  - IgG 1, 2, 3, and 4; Future  - Lipid panel; Future    4  MPA (microscopic polyangiitis) (HCC)-associated with chronic pancreatitis? will further evaluate for this we are doing a literature search  5  Chronic pancreatitis, unspecified pancreatitis type (HCC)-will further evaluate for cause of pancreatitis  6  Left low back pain, unspecified chronicity, with sciatica presence unspecified-this has been ongoing for 1 year  Symptoms sound to be musculoskeletal in origin  - lidocaine (LIDODERM) 5 %; Place 1 patch on the skin daily Remove & Discard patch within 12 hours or as directed by MD  Dispense: 30 patch; Refill: 0    7  History of chronic pelvic pain she is planning on getting ultrasound of the pelvis and pelvic PT   ______________________________________________________________________    SUBJECTIVE:      She presents with new onset diarrhea, persistent abdominal pain in epigastric region and lower abdominal region, and new diagnosis of pelvic floor dysfunction  Diarrhea is nonbloody associated multiple episodes per day  This has been ongoing for the last 2-3 weeks  This is rare as it is rare is usually has a history of constipation  Denies any sick contacts, fevers, chills, or travel  She has chronic epigastric pain which may be secondary to recurrent pancreatitis  CT scan evaluation was negative for acute inflammation    But lipase in setting of elevated lipase may be secondary to pancreatic origin versus peptic ulcer disease  We had scheduled for an EGD evaluation but she had postponed it  Due to persistent pain will plan to reschedule her again  She was recently diagnosed with pelvic floor dysfunction plans to follow up with pelvic PT for further evaluation management of this  She does have fecal incontinence may be secondary to pelvic floor dysfunction  She is also getting evaluation we a pelvic ultrasound  REVIEW OF SYSTEMS IS OTHERWISE NEGATIVE        Historical Information   Past Medical History:   Diagnosis Date    Anemia of chronic disease     Anxiety     Asthma     Asthma     Chronic abdominal pain     CKD (chronic kidney disease) stage 3, GFR 30-59 ml/min     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 [address] [facility]     [facility]     Diabetes mellitus [facility]     DVT [facility]deep venous thrombosis) [facility]     History of acute pancreatitis     felt secondary to Bactrim    HTN (hypertension)     Hyperlipidemia     Hypertension     Microscopic polyangiitis (HCC)     MPA (microscopic polyangiitis) (HCC)     Renal disorder     Self-inflicted injury     self inflicted skin wounds    Wegener's granulomatosis with renal involvement ([facility] ) 2015     Past Surgical History:   Procedure Laterality Date    ESOPHAGOGASTRODUODENOSCOPY  09/11/2015    mild antral gastritis    RELEASE SCAR CONTRACTURE / GRAFT REPAIRS OF HAND       Social History   History   Alcohol Use No     History   Drug Use    Types: Marijuana     History   Smoking Status    Former Smoker    Types: Cigarettes    Quit date: 2010   Smokeless Tobacco    Never Used     Family History   Problem Relation Age of Onset    Colon cancer Neg Hx     Drug abuse Neg Hx      mother father    Mental illness Neg Hx      disorder, mother father       Meds/Allergies       Current Outpatient Prescriptions:     amphetamine-dextroamphetamine (ADDERALL XR) 20 MG 24 hr capsule    cephalexin (KEFLEX) 250 mg capsule    cycloSPORINE (RESTASIS) 0 05 % ophthalmic emulsion    gabapentin (NEURONTIN) 300 mg capsule    glucose blood (FREESTYLE LITE) test strip    Humidifiers (COOL MIST HUMIDIFIER 1 GALLON) MISC    insulin glargine (LANTUS SOLOSTAR) injection pen 100 units/mL    insulin lispro (HUMALOG KWIKPEN) 100 Units/mL SOPN    Insulin Pen Needle (PEN NEEDLES) 31G X 8 MM MISC    Lancets (FREESTYLE) lancets    metroNIDAZOLE (FLAGYL) 500 mg tablet    ondansetron (ZOFRAN) 4 mg tablet    ondansetron (ZOFRAN-ODT) 4 mg disintegrating tablet    oxyCODONE-acetaminophen (PERCOCET) 7 5-325 MG per tablet    QUEtiapine (SEROquel) 50 mg tablet    sucralfate (CARAFATE) 1 g/10 mL suspension    lidocaine (LIDODERM) 5 %    metoprolol succinate (TOPROL-XL) 25 mg 24 hr tablet    polyethylene glycol (GOLYTELY) 4000 mL solution    Allergies   Allergen Reactions    Bactrim [Sulfamethoxazole-Trimethoprim]      Pt "They think that is what cause the pancreatitis"     Haldol [Haloperidol] Other (See Comments)     "I don't like it"    Ibuprofen     Navane [Thiothixene]      SI    Other      "novaine?" antipsychotic    Prozac [Fluoxetine Hcl]      SI    Lexapro [Escitalopram Oxalate] Rash           Objective     Blood pressure 132/80, pulse 79, temperature 98 3 °F (36 8 °C), temperature source Tympanic, height 5' 1" (1 549 m), weight 90 7 kg (200 lb), not currently breastfeeding  PHYSICAL EXAM:      General Appearance:   Alert, cooperative, no distress   HEENT:   Normocephalic, atraumatic, anicteric      Neck:  Supple, symmetrical, trachea midline   Lungs:   Clear to auscultation bilaterally; no rales, rhonchi or wheezing; respirations unlabored    Heart[de-identified]   Regular rate and rhythm; no murmur, rub, or gallop     Abdomen:   Epigastric discomfort, non-distended; normal bowel sounds; no masses, no organomegaly    Genitalia:   Deferred    Rectal:   Deferred    Extremities:  No cyanosis, clubbing or edema    Pulses:  2+ and symmetric    Skin:  No jaundice, rashes, or lesions    Lymph nodes:  No palpable cervical lymphadenopathy        Lab Results:   No visits with results within 1 Day(s) from this visit  Latest known visit with results is:   Admission on 03/26/2018, Discharged on 03/26/2018   Component Date Value    Color, UA 03/26/2018 Yellow     Clarity, UA 03/26/2018 Clear     pH, UA 03/26/2018 5 0     Leukocytes, UA 03/26/2018 Large*    Nitrite, UA 03/26/2018 Negative     Protein, UA 03/26/2018 >=300*    Glucose, UA 03/26/2018 Negative     Ketones, UA 03/26/2018 Negative     Urobilinogen, UA 03/26/2018 0 2     Bilirubin, UA 03/26/2018 Negative     Blood, UA 03/26/2018 Moderate*    Specific Gravity, UA 03/26/2018 1 025     RBC, UA 03/26/2018 4-10*    WBC, UA 03/26/2018 Innumerable*    Epithelial Cells 03/26/2018 Occasional     Bacteria, UA 03/26/2018 Occasional     OTHER OBSERVATIONS 03/26/2018 Trichomonas Organisms Present     Urine Culture 03/26/2018 5505-5608 cfu/ml           Radiology Results:   No results found

## 2018-03-28 NOTE — TELEPHONE ENCOUNTER
DR Morse [facility] called requesting the dosage of the lidocaine because it is very expensive  It says three times per day but no amount   Please verify thank you

## 2018-03-28 NOTE — PATIENT INSTRUCTIONS
Colonoscopy   AMBULATORY CARE:   What you need to know about a colonoscopy:  A colonoscopy is a procedure to examine the inside of your colon (intestine) with a scope  A scope is a flexible tube with a small light and camera on the end  Polyps or tissue growths may be removed during your colonoscopy  What you need to do the week before your colonoscopy: You will need to stop taking medicines that contain aspirin or iron for 7 days before your colonoscopy  If you take anticoagulants, such as warfarin, ask when you should stop taking it  Make plans for someone to drive you home after your procedure  How to prepare for your colonoscopy: Your healthcare provider will have you prepare your bowels before your procedure  Your bowels will need to be empty before your procedure to allow him to clearly see your colon  You will need to do the following the day before your procedure:  · Have only clear liquids  for the entire day before your colonoscopy  Clear liquid diet includes clear fruit juices and broths, clear flavored gelatin, and hard candy  It also includes coffee, tea, carbonated beverages, and clear sports drinks  · Follow your bowel prep as directed  There are many different preparations that can be given before a colonoscopy  Some are given over 2 hours and others over 6 hours  Some are given earlier in the afternoon the day before the colonoscopy  Others are given the day before and then the morning of the colonoscopy  With any bowel prep, stay close to the bathroom  This liquid will cause your bowels to move frequently  · An enema  may be needed  Your healthcare provider may tell you to use an enema to help clean out your bowels  · Do not eat or drink anything after midnight  This will help prevent problems that can happen if you vomit while under anesthesia  What will happen during your colonoscopy:   · You will be given medicine to help you relax   You will lie on your left side and raise one or both knees toward your chest  Your healthcare provider will examine your anus and use a finger to check your rectum  You may need another enema if your bowel is not empty  The scope will be lubricated and gently placed into your anus  It will then be passed through your rectum and into your colon  Water or air will be put into your colon to help clean or expand it  This is done so your healthcare provider can see your colon clearly  · Tissue samples may be taken from the walls of your bowel and sent to a lab for tests  If you have a polyp, your healthcare provider will pass a wire loop through the scope and use it to hold the polyp  The polyp is then burned or cut off the wall of your colon  Removed polyps are sent to a lab for tests  Pictures of your colon may be taken during the procedure  The scope will be removed when the procedure is done  What will happen after your colonoscopy:   · Rest after your procedure  You may feel bloated, have some gas and abdominal discomfort  You may need to lie on your right side with a heating pad on your abdomen  You may need to take short walks to help move the gas out  Eat small meals, if you feel bloated  Do not drive or make important decisions until the day after your procedure  · You may have polyps removed  Do not take aspirin or go on long car trips for 7 days after your procedure  Ask your healthcare provider about any other limits after your procedure  Risks of a colonoscopy: You may have pain or bleeding after the scope or polyps are removed  You may also have a slow heartbeat, decreased blood pressure, or increased sweating  Your colon may tear due to the increased pressure from the scope and other instruments  This may cause bowel contents to leak out of your colon and into your abdomen  If this happens, you will need to stay in the hospital and have surgery on your colon     Seek care immediately if:   · You have a large amount of bright red blood in your bowel movements  · Your abdomen is hard and firm and you have severe pain  · You have sudden trouble breathing  Contact your healthcare provider if:   · You develop a rash or hives  · You have a fever within 24 hours of your procedure  · You have not had a bowel movement for 3 days after your procedure  · You have questions or concerns about your condition or care  Activity:   · Do not lift, strain, or run  for 3 days after your procedure  · Rest after your procedure  You have been given medicine to relax you  Do not  drive or make important decisions until the day after your procedure  Return to your normal activity as directed  · Relieve gas and discomfort from bloating  by lying on your right side with a heating pad on your abdomen  You may need to take short walks to help the gas move out  Eat small meals until bloating is relieved  If you had polyps removed: For 7 days after your procedure:  · Do not  take aspirin  · Do not  go on long car rides  Help prevent constipation:   · Eat a variety of healthy foods  Healthy foods include fruit, vegetables, whole-grain breads, low-fat dairy products, beans, lean meat, and fish  Ask if you need to be on a special diet  Your healthcare provider may recommend that you eat high-fiber foods such as cooked beans  Fiber helps you have regular bowel movements  · Drink liquids as directed  Adults should drink between 9 and 13 eight-ounce cups of liquid every day  Ask what amount is best for you  For most people, good liquids to drink are water, juice, and milk  · Exercise as directed  Talk to your healthcare provider about the best exercise plan for you  Exercise can help prevent constipation, decrease your blood pressure and improve your health  Follow up with your healthcare provider as directed:  Write down your questions so you remember to ask them during your visits     © 2017 [employer] Information is for End User's use only and may not be sold, redistributed or otherwise used for commercial purposes  All illustrations and images included in CareNotes® are the copyrighted property of [employer]  or [employer]  The above information is an  only  It is not intended as medical advice for individual conditions or treatments  Talk to your doctor, nurse or pharmacist before following any medical regimen to see if it is safe and effective for you  Upper Endoscopy   AMBULATORY CARE:   What you need to know about an upper endoscopy: An upper endoscopy is also called an upper gastrointestinal (GI) endoscopy, or an esophagogastroduodenoscopy (EGD)  A scope (thin, flexible tube with a light and camera) is used to examine the walls of your upper intestines  The upper intestines include the esophagus, stomach, and duodenum (first part of the small intestine)  An upper endoscopy is used to look for problems, such as bleeding, polyps, ulcers, or infection  How to prepare for an upper endoscopy: Your healthcare provider will talk to you about how to prepare for your procedure  You may need to not eat or drink anything except water for 6 to 12 hours before the procedure  He will tell you what medicines to take or not take on the day of your procedure  Arrange to have someone drive you home  What will happen during an upper endoscopy:   · You will be given medicine through your IV to help you relax and make you drowsy  You will also be given medicine to numb your throat  You may need to wear a plastic mouthpiece to help hold your mouth open and protect your teeth and tongue  Your healthcare provider will gently insert the endoscope through your mouth and down into your throat  You may be asked to swallow once to help move the scope into your upper intestines  You may feel pressure in your throat but you should not feel pain  The endoscope does not restrict your breathing       · Your healthcare provider will watch the scope on a monitor  He will take pictures with the scope  He may gently inject air so he can see your digestive tract clearly  Your healthcare provider may take tissue samples and send them to the lab for tests  He may remove foreign objects, tumors, or polyps that may be blocking your upper intestines  Your healthcare provider may also insert tools with the scope to treat bleeding or place a stent (tube)  When the procedure is finished, the endoscope will be slowly removed  What will happen after an upper endoscopy: You may feel bloated, gassy, or have some abdominal discomfort  Your throat may be sore for 24 to 36 hours after the procedure  You may burp or pass gas from air that is still inside your body after your procedure  You may need to take short walks to help move the gas out  Eat small meals, if you feel bloated  Do not drive or make important decisions until the day after your procedure  Risks of an upper endoscopy: Your esophagus, stomach, or duodenum may be punctured or torn during the procedure  This is because of increased pressure as the scope and air are passing through  You may bleed more than expected or get an infection  You may have a slow or irregular heartbeat, or low blood pressure  This can cause sweating and fainting  Fluid may enter your lungs and you may have trouble breathing  These problems can be life-threatening  Call 911 if:   · You have sudden chest pain or trouble breathing  Seek care immediately if:   · You feel dizzy or faint  · You have trouble swallowing  · You have severe throat pain  · Your bowel movements are very dark or black  · Your abdomen is hard and firm and you have severe pain  · You vomit blood  Contact your healthcare provider if:   · You feel full or bloated and cannot burp or pass gas  · You have not had a bowel movement for 3 days after your procedure  · You have neck pain  · You have a fever or chills      · You have nausea or are vomiting  · You have a rash or hives  · You have questions or concerns about your endoscopy  Relieve a sore throat:  Suck on throat lozenges or crushed ice  Gargle with a small amount of warm salt water  Mix 1 teaspoon of salt and 1 cup of warm water to make salt water  Relieve gas and discomfort from bloating:  Lie on your right side with a heating pad on your abdomen  Take short walks to help pass gas  Eat small meals until bloating is relieved  Rest after your procedure: You have been given medicine to relax you  Do not  drive or make important decisions until the day after your procedure  Return to your normal activity as directed  You can usually return to work the day after your procedure  Follow up with your healthcare provider as directed:  Write down your questions so you remember to ask them during your visits  © 2017 [employer]  Information is for End User's use only and may not be sold, redistributed or otherwise used for commercial purposes  All illustrations and images included in CareNotes® are the copyrighted property of [employer]  or [employer]  The above information is an  only  It is not intended as medical advice for individual conditions or treatments  Talk to your doctor, nurse or pharmacist before following any medical regimen to see if it is safe and effective for you  Colonoscopy and EGD scheduled on 5/24/2018 with Dr Jackie Calvo at the [facility]/Ducolax sent to pharmacy/instructions giving to pt  She is aware that she would be getting a call a day before for procedure time

## 2018-03-30 ENCOUNTER — HOSPITAL ENCOUNTER (OUTPATIENT)
Dept: ULTRASOUND IMAGING | Facility: HOSPITAL | Age: 48
Discharge: HOME | End: 2018-03-30
Payer: COMMERCIAL

## 2018-03-30 DIAGNOSIS — R10.2 PELVIC PAIN: ICD-10-CM

## 2018-03-30 PROCEDURE — 76830 TRANSVAGINAL US NON-OB: CPT

## 2018-03-30 PROCEDURE — 76856 US EXAM PELVIC COMPLETE: CPT

## 2018-04-04 DIAGNOSIS — IMO0001 IDDM (INSULIN DEPENDENT DIABETES MELLITUS): Chronic | ICD-10-CM

## 2018-04-05 RX ORDER — LANCETS 28 GAUGE
EACH MISCELLANEOUS 3 TIMES DAILY
Qty: 100 EACH | Refills: 11 | Status: SHIPPED | OUTPATIENT
Start: 2018-04-05 | End: 2019-04-16 | Stop reason: SDUPTHER

## 2018-04-08 ENCOUNTER — HOSPITAL ENCOUNTER (EMERGENCY)
Facility: HOSPITAL | Age: 48
Discharge: HOME | End: 2018-04-08
Attending: EMERGENCY MEDICINE
Payer: COMMERCIAL

## 2018-04-08 ENCOUNTER — APPOINTMENT (EMERGENCY)
Dept: CT IMAGING | Facility: HOSPITAL | Age: 48
End: 2018-04-08
Payer: COMMERCIAL

## 2018-04-08 VITALS
TEMPERATURE: 98.1 F | BODY MASS INDEX: 37.79 KG/M2 | RESPIRATION RATE: 18 BRPM | SYSTOLIC BLOOD PRESSURE: 144 MMHG | OXYGEN SATURATION: 95 % | HEART RATE: 75 BPM | WEIGHT: 200 LBS | DIASTOLIC BLOOD PRESSURE: 85 MMHG

## 2018-04-08 DIAGNOSIS — Z87.19 HX OF PANCREATITIS: ICD-10-CM

## 2018-04-08 DIAGNOSIS — R07.9 CHEST PAIN: Primary | ICD-10-CM

## 2018-04-08 DIAGNOSIS — M31.7 MICROSCOPIC POLYANGIITIS (HCC): ICD-10-CM

## 2018-04-08 DIAGNOSIS — R10.9 LEFT FLANK PAIN: ICD-10-CM

## 2018-04-08 LAB
ALBUMIN SERPL BCP-MCNC: 3.5 G/DL (ref 3.5–5)
ALP SERPL-CCNC: 128 U/L (ref 46–116)
ALT SERPL W P-5'-P-CCNC: 24 U/L (ref 12–78)
ANION GAP SERPL CALCULATED.3IONS-SCNC: 12 MMOL/L (ref 4–13)
AST SERPL W P-5'-P-CCNC: 22 U/L (ref 5–45)
ATRIAL RATE: 77 BPM
BACTERIA UR QL AUTO: ABNORMAL /HPF
BASOPHILS # BLD AUTO: 0.02 THOUSANDS/ΜL (ref 0–0.1)
BASOPHILS NFR BLD AUTO: 0 % (ref 0–1)
BILIRUB SERPL-MCNC: 0.19 MG/DL (ref 0.2–1)
BILIRUB UR QL STRIP: NEGATIVE
BUN SERPL-MCNC: 32 MG/DL (ref 5–25)
CALCIUM SERPL-MCNC: 8.1 MG/DL (ref 8.3–10.1)
CHLORIDE SERPL-SCNC: 106 MMOL/L (ref 100–108)
CLARITY UR: CLEAR
CO2 SERPL-SCNC: 25 MMOL/L (ref 21–32)
COLOR UR: YELLOW
CREAT SERPL-MCNC: 2.17 MG/DL (ref 0.6–1.3)
DEPRECATED D DIMER PPP: <270 NG/ML (FEU) (ref 0–424)
EOSINOPHIL # BLD AUTO: 0.05 THOUSAND/ΜL (ref 0–0.61)
EOSINOPHIL NFR BLD AUTO: 1 % (ref 0–6)
ERYTHROCYTE [DISTWIDTH] IN BLOOD BY AUTOMATED COUNT: 13.8 % (ref 11.6–15.1)
EXT PREG TEST URINE: NEGATIVE
GFR SERPL CREATININE-BSD FRML MDRD: 26 ML/MIN/1.73SQ M
GLUCOSE SERPL-MCNC: 143 MG/DL (ref 65–140)
GLUCOSE UR STRIP-MCNC: NEGATIVE MG/DL
HCT VFR BLD AUTO: 37.7 % (ref 34.8–46.1)
HGB BLD-MCNC: 12.7 G/DL (ref 11.5–15.4)
HGB UR QL STRIP.AUTO: ABNORMAL
KETONES UR STRIP-MCNC: NEGATIVE MG/DL
LEUKOCYTE ESTERASE UR QL STRIP: NEGATIVE
LIPASE SERPL-CCNC: 421 U/L (ref 73–393)
LYMPHOCYTES # BLD AUTO: 1.87 THOUSANDS/ΜL (ref 0.6–4.47)
LYMPHOCYTES NFR BLD AUTO: 31 % (ref 14–44)
MCH RBC QN AUTO: 31.1 PG (ref 26.8–34.3)
MCHC RBC AUTO-ENTMCNC: 33.7 G/DL (ref 31.4–37.4)
MCV RBC AUTO: 92 FL (ref 82–98)
MONOCYTES # BLD AUTO: 0.59 THOUSAND/ΜL (ref 0.17–1.22)
MONOCYTES NFR BLD AUTO: 10 % (ref 4–12)
NEUTROPHILS # BLD AUTO: 3.49 THOUSANDS/ΜL (ref 1.85–7.62)
NEUTS SEG NFR BLD AUTO: 58 % (ref 43–75)
NITRITE UR QL STRIP: NEGATIVE
NON-SQ EPI CELLS URNS QL MICRO: ABNORMAL /HPF
P AXIS: 67 DEGREES
PH UR STRIP.AUTO: 6.5 [PH] (ref 4.5–8)
PLATELET # BLD AUTO: 305 THOUSANDS/UL (ref 149–390)
PMV BLD AUTO: 10 FL (ref 8.9–12.7)
POTASSIUM SERPL-SCNC: 4.2 MMOL/L (ref 3.5–5.3)
PR INTERVAL: 144 MS
PROT SERPL-MCNC: 7.7 G/DL (ref 6.4–8.2)
PROT UR STRIP-MCNC: >=300 MG/DL
QRS AXIS: 0 DEGREES
QRSD INTERVAL: 68 MS
QT INTERVAL: 352 MS
QTC INTERVAL: 398 MS
RBC # BLD AUTO: 4.08 MILLION/UL (ref 3.81–5.12)
RBC #/AREA URNS AUTO: ABNORMAL /HPF
SODIUM SERPL-SCNC: 143 MMOL/L (ref 136–145)
SP GR UR STRIP.AUTO: 1.02 (ref 1–1.03)
T WAVE AXIS: 30 DEGREES
TROPONIN I SERPL-MCNC: <0.02 NG/ML
UROBILINOGEN UR QL STRIP.AUTO: 0.2 E.U./DL
VENTRICULAR RATE: 77 BPM
WBC # BLD AUTO: 6.02 THOUSAND/UL (ref 4.31–10.16)
WBC #/AREA URNS AUTO: ABNORMAL /HPF

## 2018-04-08 PROCEDURE — 81002 URINALYSIS NONAUTO W/O SCOPE: CPT | Performed by: EMERGENCY MEDICINE

## 2018-04-08 PROCEDURE — 36415 COLL VENOUS BLD VENIPUNCTURE: CPT

## 2018-04-08 PROCEDURE — 85025 COMPLETE CBC W/AUTO DIFF WBC: CPT | Performed by: EMERGENCY MEDICINE

## 2018-04-08 PROCEDURE — 80053 COMPREHEN METABOLIC PANEL: CPT | Performed by: EMERGENCY MEDICINE

## 2018-04-08 PROCEDURE — 74176 CT ABD & PELVIS W/O CONTRAST: CPT

## 2018-04-08 PROCEDURE — 84484 ASSAY OF TROPONIN QUANT: CPT | Performed by: EMERGENCY MEDICINE

## 2018-04-08 PROCEDURE — 93005 ELECTROCARDIOGRAM TRACING: CPT

## 2018-04-08 PROCEDURE — 85379 FIBRIN DEGRADATION QUANT: CPT | Performed by: EMERGENCY MEDICINE

## 2018-04-08 PROCEDURE — 81025 URINE PREGNANCY TEST: CPT | Performed by: EMERGENCY MEDICINE

## 2018-04-08 PROCEDURE — 83690 ASSAY OF LIPASE: CPT | Performed by: EMERGENCY MEDICINE

## 2018-04-08 PROCEDURE — 93010 ELECTROCARDIOGRAM REPORT: CPT | Performed by: INTERNAL MEDICINE

## 2018-04-08 PROCEDURE — 81001 URINALYSIS AUTO W/SCOPE: CPT

## 2018-04-08 PROCEDURE — 99285 EMERGENCY DEPT VISIT HI MDM: CPT

## 2018-04-08 PROCEDURE — 96374 THER/PROPH/DIAG INJ IV PUSH: CPT

## 2018-04-08 RX ORDER — HYDROMORPHONE HCL 110MG/55ML
1 PATIENT CONTROLLED ANALGESIA SYRINGE INTRAVENOUS ONCE
Status: COMPLETED | OUTPATIENT
Start: 2018-04-08 | End: 2018-04-08

## 2018-04-08 RX ADMIN — HYDROMORPHONE HYDROCHLORIDE 1 MG: 2 INJECTION, SOLUTION INTRAMUSCULAR; INTRAVENOUS; SUBCUTANEOUS at 18:58

## 2018-04-08 NOTE — ED PROVIDER NOTES
History  Chief Complaint   Patient presents with    Chest Pain     Reports "right flank pain, [points to mid right spinal area] that goes through to my back " Pt reports pain radiates to chest  States has hx of kidney problems  Reports burning with urination  Pt      66-year-old female history of chronic pancreatitis and microscopic polyangiitis presents emergency department for evaluation of left flank pain  Patient states that she has had this pain chronically however had an acute exacerbation of it that has been lasting for approximately 2 hours today  Patient states the pain is worse when she breathes  States that she is on scheduled Percocet given her pain from miscroscopic polyangitis and took Percocet prior to her pain  Patient denies pain being worse with urination or bowel movements  Patient denies any episodes of vomiting  Patient of note did complain of 1 episode of substernal nonradiating chest pain 2 hours ago but does not have any chest pain at this time  Patient states that she was on Xarelto for DVT in the past but has been off it for 2 years  Patient states she has been taking all her medications prescribed  Otherwise, patient denies recent ill contacts denies fever, shortness of breath, vomiting  Prior to Admission Medications   Prescriptions Last Dose Informant Patient Reported? Taking?    Humidifiers (COOL MIST HUMIDIFIER 1 GALLON) MISC  Self No No   Sig: by Does not apply route as needed (shortness of breath)   Insulin Pen Needle (PEN NEEDLES) 31G X 8 MM MISC   No No   Sig: Inject 1 Stick under the skin 4 (four) times a day (with meals and at bedtime)   Lancets (FREESTYLE) lancets   No No   Sig: by Other route 3 (three) times a day   QUEtiapine (SEROquel) 50 mg tablet  Self No No   Sig: Take 1 tablet (50 mg total) by mouth daily at bedtime   amphetamine-dextroamphetamine (ADDERALL XR) 20 MG 24 hr capsule  Self No No   Sig: Take 1 capsule (20 mg total) by mouth 2 (two) times a day Max Daily Amount: 40 mg   cycloSPORINE (RESTASIS) 0 05 % ophthalmic emulsion  Self Yes No   Sig: Administer 1 drop to both eyes 2 (two) times a day   gabapentin (NEURONTIN) 300 mg capsule  Self Yes No   Sig: Take 1 capsule by mouth 3 (three) times a day   glucose blood (FREESTYLE LITE) test strip  Self No No   Si each by Other route 3 (three) times a day   insulin aspart (NovoLOG) 100 Units/mL SOPN   No No   Sig: Inject 6 Units under the skin 3 (three) times a day with meals   insulin glargine (LANTUS SOLOSTAR) injection pen 100 units/mL  Self No No   Sig: Inject 0 22 mL (22 Units total) under the skin daily at bedtime   lidocaine (XYLOCAINE) 5 % ointment   No No   Sig: Apply topically 2 (two) times a day as needed for mild pain Please apply 5g twice daily as needed   metoprolol succinate (TOPROL-XL) 25 mg 24 hr tablet  Self Yes No   Sig: Take 1 tablet by mouth daily   ondansetron (ZOFRAN) 4 mg tablet  Self Yes No   Sig: Take 4 mg by mouth every 8 (eight) hours as needed for nausea or vomiting   ondansetron (ZOFRAN-ODT) 4 mg disintegrating tablet  Self No No   Sig: Take 1 tablet (4 mg total) by mouth every 8 (eight) hours as needed for nausea or vomiting   oxyCODONE-acetaminophen (PERCOCET) 7 5-325 MG per tablet  Self No No   Sig: Take 1 tablet by mouth every 6 (six) hours as needed (pain) Earliest Fill Date: 3/27/18 Max Daily Amount: 4 tablets   polyethylene glycol (GOLYTELY) 4000 mL solution   No No   Sig: Take 4,000 mL by mouth once for 1 dose   sucralfate (CARAFATE) 1 g/10 mL suspension  Self Yes No   Sig: Take 10 mL by mouth 3 (three) times a day      Facility-Administered Medications: None       Past Medical History:   Diagnosis Date    Anemia of chronic disease     Anxiety     Asthma     Asthma     Chronic abdominal pain     CKD (chronic kidney disease) stage 3, GFR 30-59 ml/min     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[address] )     [facility]     Diabetes mellitus ([facility] [address] [facility] )     DVT [facility]deep venous thrombosis) [facility])     History of acute pancreatitis     felt secondary to Bactrim    HTN (hypertension)     Hyperlipidemia     Hypertension     Microscopic polyangiitis (HCC)     MPA (microscopic polyangiitis) (HCC)     Renal disorder     Self-inflicted injury     self inflicted skin wounds    Wegener's granulomatosis with renal involvement ([facility] [address] ) 2015       Past Surgical History:   Procedure Laterality Date    ESOPHAGOGASTRODUODENOSCOPY  09/11/2015    mild antral gastritis    RELEASE SCAR CONTRACTURE / GRAFT REPAIRS OF HAND         Family History   Problem Relation Age of Onset    Colon cancer Neg Hx     Drug abuse Neg Hx      mother father    Mental illness Neg Hx      disorder, mother father     I have reviewed and agree with the history as documented  Social History   Substance Use Topics    Smoking status: Former Smoker     Types: Cigarettes     Quit date: 2010    Smokeless tobacco: Never Used    Alcohol use No        Review of Systems   Constitutional: Negative for appetite change, chills, diaphoresis, fatigue and fever  HENT: Negative for congestion, ear discharge, ear pain, hearing loss, postnasal drip, rhinorrhea, sneezing and sore throat  Eyes: Negative for pain, discharge and redness  Respiratory: Negative for choking, chest tightness, shortness of breath, wheezing and stridor  Cardiovascular: Positive for chest pain  Negative for palpitations  Gastrointestinal: Negative for abdominal distention, abdominal pain, blood in stool, constipation, diarrhea, nausea and vomiting  Genitourinary: Positive for flank pain  Negative for decreased urine volume, difficulty urinating, dysuria, frequency and hematuria  Musculoskeletal: Negative for arthralgias, gait problem, joint swelling and neck pain  Skin: Negative for color change, pallor and rash  Allergic/Immunologic: Negative for environmental allergies, food allergies and immunocompromised state     Neurological: Negative for dizziness, seizures, weakness, light-headedness, numbness and headaches  Hematological: Negative for adenopathy  Does not bruise/bleed easily  Psychiatric/Behavioral: Negative for agitation and behavioral problems  Physical Exam  ED Triage Vitals [04/08/18 1721]   Temperature Pulse Respirations Blood Pressure SpO2   98 1 °F (36 7 °C) 83 16 149/83 98 %      Temp Source Heart Rate Source Patient Position - Orthostatic VS BP Location FiO2 (%)   Temporal Monitor Sitting Right arm --      Pain Score       7           Orthostatic Vital Signs  Vitals:    04/08/18 1721 04/08/18 1915 04/08/18 1942   BP: 149/83  144/85   Pulse: 83 92 75   Patient Position - Orthostatic VS: Sitting         Physical Exam   Constitutional: She is oriented to person, place, and time  She appears well-developed and well-nourished  HENT:   Head: Normocephalic and atraumatic  Nose: Nose normal    Mouth/Throat: Oropharynx is clear and moist    Eyes: Conjunctivae and EOM are normal  Pupils are equal, round, and reactive to light  Neck: Normal range of motion  Neck supple  Cardiovascular: Normal rate, regular rhythm and normal heart sounds  Exam reveals no gallop and no friction rub  No murmur heard  Pulmonary/Chest: Effort normal and breath sounds normal    Abdominal: Soft  Bowel sounds are normal  She exhibits no distension  There is tenderness  There is no rebound and no guarding  Left flank tender   Musculoskeletal: Normal range of motion  Neurological: She is alert and oriented to person, place, and time  She has normal reflexes  Skin: Skin is warm and dry  No erythema  No pallor  Psychiatric: She has a normal mood and affect  Her behavior is normal    Nursing note and vitals reviewed        ED Medications  Medications   HYDROmorphone (DILAUDID) injection 1 mg (1 mg Intravenous Given 4/8/18 1858)       Diagnostic Studies  Results Reviewed     Procedure Component Value Units Date/Time    D-Dimer [[id number]]  (Normal) Collected: 04/08/18 2011    Lab Status:  Final result Specimen:  Blood from Arm, Right Updated:  04/08/18 2037     D-Dimer, Quant <270 ng/ml (FEU)     Urine Microscopic [[id number]]  (Abnormal) Collected:  04/08/18 1844    Lab Status:  Final result Specimen:  Urine from Urine, Clean Catch Updated:  04/08/18 1858     RBC, UA 0-1 (A) /hpf      WBC, UA 1-2 (A) /hpf      Epithelial Cells Occasional /hpf      Bacteria, UA None Seen /hpf     POCT pregnancy, urine [[id number]]  (Normal) Resulted:  04/08/18 1845    Lab Status:  Final result Updated:  04/08/18 1845     EXT PREG TEST UR (Ref: Negative) negative    POCT urinalysis dipstick [[id number]]  (Abnormal) Resulted:  04/08/18 1845    Lab Status:  Final result Specimen:  Urine Updated:  04/08/18 1845    ED Urine Macroscopic [[id number]]  (Abnormal) Collected:  04/08/18 1844    Lab Status:  Final result Specimen:  Urine Updated:  04/08/18 1844     Color, UA Yellow     Clarity, UA Clear     pH, UA 6 5     Leukocytes, UA Negative     Nitrite, UA Negative     Protein, UA >=300 (A) mg/dl      Glucose, UA Negative mg/dl      Ketones, UA Negative mg/dl      Urobilinogen, UA 0 2 E U /dl      Bilirubin, UA Negative     Blood, UA Trace (A)     Specific [place], UA 1 025    Narrative:       CLINITEK RESULT    Comprehensive metabolic panel [[id number]]  (Abnormal) Collected:  04/08/18 1731    Lab Status:  Final result Specimen:  Blood from Arm, Right Updated:  04/08/18 1836     Sodium 143 mmol/L      Potassium 4 2 mmol/L      Chloride 106 mmol/L      CO2 25 mmol/L      Anion Gap 12 mmol/L      BUN 32 (H) mg/dL      Creatinine 2 17 (H) mg/dL      Glucose 143 (H) mg/dL      Calcium 8 1 (L) mg/dL      AST 22 U/L      ALT 24 U/L      Alkaline Phosphatase 128 (H) U/L      Total Protein 7 7 g/dL      Albumin 3 5 g/dL      Total Bilirubin 0 19 (L) mg/dL      eGFR 26 ml/min/1 73sq m     Narrative:         National Kidney Disease Education Program recommendations are as follows:  GFR calculation is accurate only with a steady state creatinine  Chronic Kidney disease less than 60 ml/min/1 73 sq  meters  Kidney failure less than 15 ml/min/1 73 sq  meters  Lipase [[id number]]  (Abnormal) Collected:  04/08/18 1731    Lab Status:  Final result Specimen:  Blood from Arm, Right Updated:  04/08/18 1824     Lipase 421 (H) u/L     Troponin I [[id number]]  (Normal) Collected:  04/08/18 1731    Lab Status:  Final result Specimen:  Blood from Arm, Right Updated:  04/08/18 1755     Troponin I <0 02 ng/mL     Narrative:         [employer] Chemistry analyzer 99% cutoff is > 0 04 ng/mL in network labs    o cTnI 99% cutoff is useful only when applied to patients in the clinical setting of myocardial ischemia  o cTnI 99% cutoff should be interpreted in the context of clinical history, ECG findings and possibly cardiac imaging to establish correct diagnosis  o cTnI 99% cutoff may be suggestive but clearly not indicative of a coronary event without the clinical setting of myocardial ischemia  CBC and differential [[id number]]  (Normal) Collected:  04/08/18 1731    Lab Status:  Final result Specimen:  Blood from Arm, Right Updated:  04/08/18 1742     WBC 6 02 Thousand/uL      RBC 4 08 Million/uL      Hemoglobin 12 7 g/dL      Hematocrit 37 7 %      MCV 92 fL      MCH 31 1 pg      MCHC 33 7 g/dL      RDW 13 8 %      MPV 10 0 fL      Platelets 736 Thousands/uL      Neutrophils Relative 58 %      Lymphocytes Relative 31 %      Monocytes Relative 10 %      Eosinophils Relative 1 %      Basophils Relative 0 %      Neutrophils Absolute 3 49 Thousands/µL      Lymphocytes Absolute 1 87 Thousands/µL      Monocytes Absolute 0 59 Thousand/µL      Eosinophils Absolute 0 05 Thousand/µL      Basophils Absolute 0 02 Thousands/µL                  CT abdomen pelvis wo contrast   Final Result by Jose Mccarthy MD (04/08 1949)      No acute pathology  No urolithiasis or obstructive uropathy  Colonic diverticulosis        Stable left paraovarian cyst  Workstation performed: [id number]               Procedures  Procedures      Phone Consults  ED Phone Contact    ED Course  ED Course as of Apr 09 1500   Sun Apr 08, 2018   1824 Lipase: (!) 785   8107 Creatinine: (!) 2 17   2016 Patient states that she no longer has left flank pain  Patient states that it does not hurt to breath denies any chest pain  Bernadine Oneil' Criteria for PE    Flowsheet Row Most Recent Value   Wells' Criteria for PE   Clinical signs and symptoms of DVT  0 Filed at: 04/08/2018 1952   PE is primary diagnosis or equally likely  0 Filed at: 04/08/2018 1952   HR >100  0 Filed at: 04/08/2018 1952   Immobilization at least 3 days or Surgery in the previous 4 weeks  0 Filed at: 04/08/2018 1952   Previous, objectively diagnosed PE or DVT  1 5 Filed at: 04/08/2018 1952   Hemoptysis  0 Filed at: 04/08/2018 1952   Malignancy with treatment within 6 months or palliative  0 Filed at: 04/08/2018 1952   Wells' Criteria Total  1 5 Filed at: 04/08/2018 1952            [facility]  Number of Diagnoses or Management Options  Chest pain:   Hx of pancreatitis:   Left flank pain:   Microscopic polyangiitis [facility]):   Diagnosis management comments: 45-year-old female comes emergent department for evaluation of left flank pain  I will order urinalysis urine pregnancy I also order CBC CMP and lipase given the patient has history of pancreatitis and I to evaluate further for recurrent pancreatitis  I will treat patient's pain with Dilaudid  I will treat patient's nausea with Zofran  Also a CT abdomen and pelvis to assess for the cause of patient's abdominal pain      CritCare Time    Disposition  Final diagnoses:   Chest pain   Left flank pain   Hx of pancreatitis   Microscopic polyangiitis ([address] )     Time reflects when diagnosis was documented in both MDM as applicable and the Disposition within this note     Time User Action Codes Description Comment    4/8/2018  8:39 PM Danica Draft Add [R07 9] Chest pain     4/8/2018  8:40 PM Luhboni Montess Add [R10 9] Left flank pain     4/8/2018  8:40 PM Luh Bottoms Add [Z87 19] Hx of pancreatitis     4/8/2018  8:40 PM Luh Montess Add [M31 7] Microscopic polyangiitis [facility])       ED Disposition     ED Disposition Condition Comment    Discharge  Abigail Ripple discharge to home/self care      Condition at discharge: Stable        Follow-up Information     Follow up With Specialties Details Why Contact Jono Naik MD Family Medicine In 3 days  [address] [place]  [address] [facility] [address]  [address]          Discharge Medication List as of 4/8/2018  8:41 PM      CONTINUE these medications which have NOT CHANGED    Details   amphetamine-dextroamphetamine (ADDERALL XR) 20 MG 24 hr capsule Take 1 capsule (20 mg total) by mouth 2 (two) times a day Max Daily Amount: 40 mg, Starting Tue 3/20/2018, Print      cycloSPORINE (RESTASIS) 0 05 % ophthalmic emulsion Administer 1 drop to both eyes 2 (two) times a day, Historical Med      gabapentin (NEURONTIN) 300 mg capsule Take 1 capsule by mouth 3 (three) times a day, Historical Med      glucose blood (FREESTYLE LITE) test strip 1 each by Other route 3 (three) times a day, Starting Thu 2/1/2018, Normal      Humidifiers (COOL MIST HUMIDIFIER 1 GALLON) MISC by Does not apply route as needed (shortness of breath), Starting Tue 2/27/2018, Print      insulin aspart (NovoLOG) 100 Units/mL SOPN Inject 6 Units under the skin 3 (three) times a day with meals, Starting Wed 3/28/2018, Normal      insulin glargine (LANTUS SOLOSTAR) injection pen 100 units/mL Inject 0 22 mL (22 Units total) under the skin daily at bedtime, Starting Tue 3/13/2018, Normal      Insulin Pen Needle (PEN NEEDLES) 31G X 8 MM MISC Inject 1 Stick under the skin 4 (four) times a day (with meals and at bedtime), Starting Wed 3/28/2018, Normal      Lancets (FREESTYLE) lancets by Other route 3 (three) times a day, Starting Thu 4/5/2018, Normal      lidocaine (XYLOCAINE) 5 % ointment Apply topically 2 (two) times a day as needed for mild pain Please apply 5g twice daily as needed, Starting Wed 3/28/2018, Normal      metoprolol succinate (TOPROL-XL) 25 mg 24 hr tablet Take 1 tablet by mouth daily, Starting Wed 11/4/2015, Historical Med      ondansetron (ZOFRAN) 4 mg tablet Take 4 mg by mouth every 8 (eight) hours as needed for nausea or vomiting, Historical Med      ondansetron (ZOFRAN-ODT) 4 mg disintegrating tablet Take 1 tablet (4 mg total) by mouth every 8 (eight) hours as needed for nausea or vomiting, Starting Mon 3/19/2018, Print      oxyCODONE-acetaminophen (PERCOCET) 7 5-325 MG per tablet Take 1 tablet by mouth every 6 (six) hours as needed (pain) Earliest Fill Date: 3/27/18 Max Daily Amount: 4 tablets, Starting Tue 3/27/2018, Print      polyethylene glycol (GOLYTELY) 4000 mL solution Take 4,000 mL by mouth once for 1 dose, Starting Wed 3/28/2018, Normal      QUEtiapine (SEROquel) 50 mg tablet Take 1 tablet (50 mg total) by mouth daily at bedtime, Starting Tue 2/27/2018, Normal      sucralfate (CARAFATE) 1 g/10 mL suspension Take 10 mL by mouth 3 (three) times a day, Starting Fri 9/22/2017, Historical Med           No discharge procedures on file  ED Provider  Attending physically available and evaluated Sierra Carolyn CASTRO managed the patient along with the ED Attending      Electronically Signed by         Prasad Vega MD  04/09/18 1500

## 2018-04-08 NOTE — ED NOTES
Pharmacy called due to inability to pull 1mg Dilaudid from [facility] (out of stock) Pharmacy changing profile of med so med can be pulled     Gurpreet Clements, RN  04/08/18 2568

## 2018-04-09 NOTE — ED ATTENDING ATTESTATION
Gorge Neff MD, saw and evaluated the patient  I have discussed the patient with the resident/non-physician practitioner and agree with the resident's/non-physician practitioner's findings, Plan of Care, and MDM as documented in the resident's/non-physician practitioner's note, except where noted  All available labs and Radiology studies were reviewed  At this point I agree with the current assessment done in the Emergency Department  I have conducted an independent evaluation of this patient a history and physical is as follows:    42-year-old female presents for evaluation of abdominal and back pain  The patient has a history of multiple medical problems including recurrent pancreatitis  She states that she does have chronic left flank pain which has been ongoing for years but she states that several hours prior to presentation she had a sudden worsening epigastric and back pain  Symptoms started 6 hours prior to presentation  She denies any trauma  States that the pain is worse with direct palpation or with deep inspiration  She does have a history of a DVT in the past and is not currently on anticoagulants  She has not had any fevers  She denies nausea or vomiting  She has no diarrhea  Denies shortness of breath  Patient was triaged as chest pain although this seems to be more abdominal on our evaluation  Physical exam:  GCS is 15, non focal   Sclerae nonicteric, conjunctiva normal   Moist mucous membranes  Regular rate and rhythm, clear to auscultation bilaterally, abdomen is soft and nondistended  There is epigastric tenderness without rebound or guarding  The patient does have upper lumbar tenderness paraspinally  There is no midline tenderness, step-offs, or deformity  Extremities are nontender without edema  Impression plan:  42-year-old female with epigastric pain radiating to her back    Will perform a broad workup includin abdominal screening labs, noncontrast CT scan of the abdomen and pelvis, will check troponin EKG to rule out ACS as well as D-dimer to rule out pulmonary embolism  We will reassess for disposition      Critical Care Time  CritCare Time    Procedures

## 2018-04-09 NOTE — DISCHARGE INSTRUCTIONS
Follow up with primary care doctor in 3-5 days  If your symptoms worsen, return to the ED immediately  Abdominal Pain   WHAT YOU NEED TO KNOW:   Abdominal pain can be dull, achy, or sharp  You may have pain in one area of your abdomen, or in your entire abdomen  Your pain may be caused by a condition such as constipation, food sensitivity or poisoning, infection, or a blockage  Abdominal pain can also be from a hernia, appendicitis, or an ulcer  Liver, gallbladder, or kidney conditions can also cause abdominal pain  The cause of your abdominal pain may be unknown  DISCHARGE INSTRUCTIONS:   Return to the emergency department if:   · You have new chest pain or shortness of breath  · You have pulsing pain in your upper abdomen or lower back that suddenly becomes constant  · Your pain is in the right lower abdominal area and worsens with movement  · You have a fever over 100 4°F (38°C) or shaking chills  · You are vomiting and cannot keep food or liquids down  · Your pain does not improve or gets worse over the next 8 to 12 hours  · You see blood in your vomit or bowel movements, or they look black and tarry  · Your skin or the whites of your eyes turn yellow  · You are a woman and have a large amount of vaginal bleeding that is not your monthly period  Contact your healthcare provider if:   · You have pain in your lower back  · You are a man and have pain in your testicles  · You have pain when you urinate  · You have questions or concerns about your condition or care  Follow up with your healthcare provider within 24 hours or as directed:  Write down your questions so you remember to ask them during your visits  Medicines:   · Medicines  may be given to calm your stomach and prevent vomiting or to decrease pain  Ask how to take pain medicine safely  · Take your medicine as directed    Contact your healthcare provider if you think your medicine is not helping or if you have side effects  Tell him of her if you are allergic to any medicine  Keep a list of the medicines, vitamins, and herbs you take  Include the amounts, and when and why you take them  Bring the list or the pill bottles to follow-up visits  Carry your medicine list with you in case of an emergency  © 2017 [employer] Information is for End User's use only and may not be sold, redistributed or otherwise used for commercial purposes  All illustrations and images included in CareNotes® are the copyrighted property of [employer]  or [employer] Mat[employer]  The above information is an  only  It is not intended as medical advice for individual conditions or treatments  Talk to your doctor, nurse or pharmacist before following any medical regimen to see if it is safe and effective for you

## 2018-04-10 ENCOUNTER — OFFICE VISIT (OUTPATIENT)
Dept: OBGYN CLINIC | Facility: CLINIC | Age: 48
End: 2018-04-10
Payer: COMMERCIAL

## 2018-04-10 VITALS — WEIGHT: 199 LBS | SYSTOLIC BLOOD PRESSURE: 118 MMHG | BODY MASS INDEX: 37.6 KG/M2 | DIASTOLIC BLOOD PRESSURE: 76 MMHG

## 2018-04-10 DIAGNOSIS — M62.89 PELVIC FLOOR DYSFUNCTION: Primary | ICD-10-CM

## 2018-04-10 PROCEDURE — 99213 OFFICE O/P EST LOW 20 MIN: CPT | Performed by: OBSTETRICS & GYNECOLOGY

## 2018-04-10 NOTE — PROGRESS NOTES
Subjective     Abigail Dumont is a 52 y o  female here for follow up of TVUS completed 3/30/18  She reports that her chronic pelvic has not dissipated since her last visit with Dr Dallas Mary on 3/27  She has had pelvic pain for the past 2 years, groin pain for the past year, and "vaginal and tailbone" pain for the past few months  This pain is typically better in the morning and worse in the evening  She states that her pain started after an episode of shingles 2 years ago that left her with paresthesia in her left lower buttock region and left groin  Aside from sharp, shooting pain in her pelvis radiating towards the groin, she has a spot of pinpoint tenderness on her left lateral pelvis that she states is a "deeper kind of pain that I can't massage away " She also reports numbness that radiates down her right leg occasionally, usually brought on by an extended period of sitting  She can't sit more than 10 minutes without her "tail bone hurting " She reports that she takes 7 5mg percocet up to 4 times a day because it is the only kind of pain medication that works for her  She states that she has a significant amount of arthritis  In addition, she has difficulty with urination  One time, she woke up in bed soaked in urine  She typically voids incompletely and has to void a few minutes later  She is working on Kegel exercises and would like pelvic floor therapy  Patient also reports blurry vision as well as paresthesia of her fingers and toes  She was diagnosed with T2DM 2 years ago and is on 22U humalog  Her FBG are typically in the 80s, she does not check 2hr postprandial glucose  Objective     /76   Wt 90 3 kg (199 lb)   LMP  (LMP Unknown)   BMI 37 60 kg/m²        Assessment/Plan:    Abigail Dumont is a 55yo G0 presenting for TVUS results  1) Pelvic and abdominal pain  · TVUS showed anteverted uterus measuring 5 2x1  7x2 5cm  Right and left ovaries WNL   Ovoid left paraovarian cystic structure measuring 3 5x2 0x2 5cm with small peripheral septation  Previously 3 4x1  9x3 0cm, stable  · After further discussion with the patient, it appears that her pain is likely neurologic or musculoskeletal in nature  Due to benign TVUS findings, the source of her pain is unlikely to be GYN in nature although there may be pelvic nerve involvement  · Ambulatory referral to home health made for pelvic floor therapy  · She has appointments with GI for EGD and family medicine to rule out all potential causes of pelvic pain  Will have her follow up with us in clinic in 3 months  D/w Dr Anderson Harper

## 2018-04-11 ENCOUNTER — TELEPHONE (OUTPATIENT)
Dept: GASTROENTEROLOGY | Facility: MEDICAL CENTER | Age: 48
End: 2018-04-11

## 2018-04-11 LAB
ALBUMIN SERPL-MCNC: 3.9 G/DL (ref 3.6–5.1)
ALBUMIN/CREAT UR: 366 MCG/MG CREAT
APPEARANCE UR: CLEAR
BACTERIA UR QL AUTO: ABNORMAL /HPF
BILIRUB UR QL STRIP: NEGATIVE
BUN SERPL-MCNC: 31 MG/DL (ref 7–25)
BUN/CREAT SERPL: 15 (CALC) (ref 6–22)
CALCIUM SERPL-MCNC: 9.1 MG/DL (ref 8.6–10.2)
CALCIUM SERPL-MCNC: 9.1 MG/DL (ref 8.6–10.2)
CHLORIDE SERPL-SCNC: 111 MMOL/L (ref 98–110)
CO2 SERPL-SCNC: 24 MMOL/L (ref 20–31)
COLOR UR: YELLOW
CREAT SERPL-MCNC: 2.11 MG/DL (ref 0.5–1.1)
CREAT UR-MCNC: 199 MG/DL (ref 20–320)
ERYTHROCYTE [DISTWIDTH] IN BLOOD BY AUTOMATED COUNT: 14 % (ref 11–15)
GLUCOSE SERPL-MCNC: 118 MG/DL (ref 65–99)
GLUCOSE UR QL STRIP: NEGATIVE
HCT VFR BLD AUTO: 38.1 % (ref 35–45)
HGB BLD-MCNC: 12.7 G/DL (ref 11.7–15.5)
HGB UR QL STRIP: NEGATIVE
HYALINE CASTS #/AREA URNS LPF: ABNORMAL /LPF
KETONES UR QL STRIP: NEGATIVE
LEUKOCYTE ESTERASE UR QL STRIP: NEGATIVE
MCH RBC QN AUTO: 30.2 PG (ref 27–33)
MCHC RBC AUTO-ENTMCNC: 33.3 G/DL (ref 32–36)
MCV RBC AUTO: 90.7 FL (ref 80–100)
MICROALBUMIN UR-MCNC: 72.8 MG/DL
NITRITE UR QL STRIP: NEGATIVE
PH UR STRIP: 5.5 [PH] (ref 5–8)
PHOSPHATE SERPL-MCNC: 3.8 MG/DL (ref 2.5–4.5)
PLATELET # BLD AUTO: 282 THOUSAND/UL (ref 140–400)
PMV BLD REES-ECKER: 11.1 FL (ref 7.5–12.5)
POTASSIUM SERPL-SCNC: 4.7 MMOL/L (ref 3.5–5.3)
PROT UR QL STRIP: ABNORMAL
PTH-INTACT SERPL-MCNC: 66 PG/ML (ref 14–64)
RBC # BLD AUTO: 4.2 MILLION/UL (ref 3.8–5.1)
RBC #/AREA URNS HPF: ABNORMAL /HPF
SL AMB EGFR AFRICAN AMERICAN: 32 ML/MIN/1.73M2
SL AMB EGFR NON AFRICAN AMERICAN: 27 ML/MIN/1.73M2
SODIUM SERPL-SCNC: 140 MMOL/L (ref 135–146)
SP GR UR STRIP: 1.02 (ref 1–1.03)
SQUAMOUS #/AREA URNS HPF: ABNORMAL /HPF
WBC # BLD AUTO: 5 THOUSAND/UL (ref 3.8–10.8)
WBC #/AREA URNS HPF: ABNORMAL /HPF

## 2018-04-11 NOTE — TELEPHONE ENCOUNTER
----- Message from David Ruggiero MD sent at 4/10/2018 10:31 PM EDT -----  Call patient to report normal results

## 2018-04-13 ENCOUNTER — OFFICE VISIT (OUTPATIENT)
Dept: FAMILY MEDICINE CLINIC | Facility: CLINIC | Age: 48
End: 2018-04-13
Payer: COMMERCIAL

## 2018-04-13 VITALS
TEMPERATURE: 98.2 F | RESPIRATION RATE: 18 BRPM | SYSTOLIC BLOOD PRESSURE: 114 MMHG | DIASTOLIC BLOOD PRESSURE: 72 MMHG | WEIGHT: 203 LBS | HEIGHT: 61 IN | OXYGEN SATURATION: 97 % | BODY MASS INDEX: 38.33 KG/M2 | HEART RATE: 96 BPM

## 2018-04-13 DIAGNOSIS — R10.2 CHRONIC PELVIC PAIN IN FEMALE: Primary | ICD-10-CM

## 2018-04-13 DIAGNOSIS — G89.29 CHRONIC PELVIC PAIN IN FEMALE: Primary | ICD-10-CM

## 2018-04-13 DIAGNOSIS — R10.13 EPIGASTRIC PAIN: ICD-10-CM

## 2018-04-13 DIAGNOSIS — IMO0001 IDDM (INSULIN DEPENDENT DIABETES MELLITUS): Chronic | ICD-10-CM

## 2018-04-13 DIAGNOSIS — H53.8 BLURRY VISION: ICD-10-CM

## 2018-04-13 DIAGNOSIS — G89.4 CHRONIC PAIN SYNDROME: ICD-10-CM

## 2018-04-13 PROCEDURE — 99214 OFFICE O/P EST MOD 30 MIN: CPT | Performed by: PHYSICIAN ASSISTANT

## 2018-04-13 RX ORDER — GABAPENTIN 300 MG/1
300 CAPSULE ORAL 3 TIMES DAILY
Qty: 270 CAPSULE | Refills: 1 | Status: SHIPPED | OUTPATIENT
Start: 2018-04-13 | End: 2018-09-18 | Stop reason: SDUPTHER

## 2018-04-13 RX ORDER — ONDANSETRON 4 MG/1
4 TABLET, ORALLY DISINTEGRATING ORAL EVERY 8 HOURS PRN
Qty: 30 TABLET | Refills: 0 | Status: SHIPPED | OUTPATIENT
Start: 2018-04-13 | End: 2018-06-25 | Stop reason: SDUPTHER

## 2018-04-13 NOTE — ASSESSMENT & PLAN NOTE
Informed patient that MRI of lumbar spine did show arthritis in the spine which could be related to the pain in symptoms that she is currently experiencing and the pelvic region  Options would be to follow up with pain management, nurse surgery, urogyn  She would like to follow up with the urogynecologist at this time  Will try to assist her in making sure referral for home PT gets through for her pelvic pain

## 2018-04-13 NOTE — PROGRESS NOTES
Assessment/Plan:    Epigastric pain  Refilled Zofran  Continue to follow up with GI for further evaluation  Chronic pain  Pain is stable  Continue with Percocet 7 5-325 milligrams 4 times a day  Refilled gabapentin 300 milligrams 3 times a day  Chronic pelvic pain in female  Informed patient that MRI of lumbar spine did show arthritis in the spine which could be related to the pain in symptoms that she is currently experiencing and the pelvic region  Options would be to follow up with pain management, nurse surgery, urogyn  She would like to follow up with the urogynecologist at this time  Will try to assist her in making sure referral for home PT gets through for her pelvic pain  IDDM (insulin dependent diabetes mellitus) ([facility] )  April 2018 A1c is 6 1  Patient states that she has not been using the NovoLog  Is currently using Lantus 22 units as needed when she notices her blood sugars are elevated  At this time since her A1c is under control she can continue with this regimen  Continue to check blood sugar daily  Diagnoses and all orders for this visit:    Chronic pelvic pain in female  -     Ambulatory referral to Urogynecology; Future    Blurry vision  -     Ambulatory referral to Ophthalmology; Future    Chronic pain syndrome  -     gabapentin (NEURONTIN) 300 mg capsule; Take 1 capsule (300 mg total) by mouth 3 (three) times a day    Epigastric pain  -     ondansetron (ZOFRAN-ODT) 4 mg disintegrating tablet; Take 1 tablet (4 mg total) by mouth every 8 (eight) hours as needed for nausea or vomiting    IDDM (insulin dependent diabetes mellitus) ([facility] )          Subjective:      Patient ID: Ulysses Burns is a 52 y o  female  55-year-old female presenting with multiple concerns today  Patient has concerns of blurry vision  Has been following up with an optometrist and was given new glasses, but states that even with the glasses she was having problems with blurry vision    States that occur randomly throughout the day  She will wake up in the morning with blurry vision then will get better throughout the day, or should be fine in the morning will progressively get worse  Denies any redness, pain, floaters, discharge  Was recommended to follow up with an ophthalmologist   Patient has been experiencing chronic pelvic pain, paresthesia, urinary incontinence for several months  MRI of lumbar spine did show arthritis worse in the lower lumbar spine, but no specific nerve compression  She had a complete gyn workup which came back negative  OBGYN believes that it is either nerve or musculoskeletal in nature  Patient is frustrated since pain is continuing even with gabapentin, and Percocet  She is supposed to get home PT for pelvic floor exercises  Patient is requesting refills for gabapentin  Does state that it helps with some of the nerve pain that she has been experiencing throughout her lower extremities  Denies any change in her pain, or lower extremity weakness  Is requesting refill of Zofran  Is continuing to follow up with GI for her abdominal symptoms, and frequent episodes of pancreatitis  Scheduled for an EGD and colonoscopy next month  Patient states that her blood sugar at home has been good  Has been using her Lantus in frequently, and has not been using Humalog at all  States blood sugar is typically around 120  The following portions of the patient's history were reviewed and updated as appropriate:   She  has a past medical history of Anemia of chronic disease; Anxiety; Asthma; Asthma; Chronic abdominal pain; CKD (chronic kidney disease) stage 3, GFR 30-59 ml/min; 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[address] ); Cushing syndrome ([facility] ); Diabetes mellitus ([facility] ); DVT (deep venous thrombosis) ([address] ); History of acute pancreatitis; HTN (hypertension); Hyperlipidemia; Hypertension; Microscopic polyangiitis ([address] ); MPA (microscopic polyangiitis) ([address] );  Renal disorder; Self-inflicted injury; and Wegener's granulomatosis with renal involvement ([facility] ) (2015)  She   Patient Active Problem List    Diagnosis Date Noted    Chronic pelvic pain in female 04/13/2018    Diarrhea of presumed infectious origin 03/28/2018    Epigastric pain 01/07/2018    Pancreatitis 01/07/2018    Ovarian cyst 01/07/2018    Postherpetic neuralgia 01/07/2018    Bipolar 1 disorder ([place] [address] ) 12/21/2017    Chronic flank pain 12/21/2017    Chronic pain 07/25/2017    Noncompliance 07/25/2017    Cataplexy 12/07/2016    Weakness of both lower extremities 11/29/2016    Acute pancreatitis 07/24/2016    CKD (chronic kidney disease) stage 3, GFR 30-59 ml/min     [facility]) [facility]     Asthma     HTN (hypertension)     Arthralgia of multiple joints 05/24/2016    IDDM (insulin dependent diabetes mellitus) ([place] [address] ) 04/06/2016    Dyslipidemia 04/06/2016    Wegener's granulomatosis ([place] [address] ) 04/06/2016    Anemia of chronic disease 07/01/2015     She  has a past surgical history that includes Esophagogastroduodenoscopy (09/11/2015) and Release scar contracture / graft repairs of hand  Her family history is not on file  She  reports that she quit smoking about 8 years ago  Her smoking use included Cigarettes  She has never used smokeless tobacco  She reports that she uses drugs, including Marijuana  She reports that she does not drink alcohol    Current Outpatient Prescriptions   Medication Sig Dispense Refill    amphetamine-dextroamphetamine (ADDERALL XR) 20 MG 24 hr capsule Take 1 capsule (20 mg total) by mouth 2 (two) times a day Max Daily Amount: 40 mg 60 capsule 0    cycloSPORINE (RESTASIS) 0 05 % ophthalmic emulsion Administer 1 drop to both eyes 2 (two) times a day      gabapentin (NEURONTIN) 300 mg capsule Take 1 capsule (300 mg total) by mouth 3 (three) times a day 270 capsule 1    glucose blood (FREESTYLE LITE) test strip 1 each by Other route 3 (three) times a day 100 each 5    Humidifiers (COOL MIST HUMIDIFIER 1 GALLON) MISC by Does not apply route as needed (shortness of breath) 1 each 0    insulin aspart (NovoLOG) 100 Units/mL SOPN Inject 6 Units under the skin 3 (three) times a day with meals 5 pen 1    insulin glargine (LANTUS SOLOSTAR) injection pen 100 units/mL Inject 0 22 mL (22 Units total) under the skin daily at bedtime 5 pen 1    Insulin Pen Needle (PEN NEEDLES) 31G X 8 MM MISC Inject 1 Stick under the skin 4 (four) times a day (with meals and at bedtime) 100 each 6    Lancets (FREESTYLE) lancets by Other route 3 (three) times a day 100 each 11    lidocaine (XYLOCAINE) 5 % ointment Apply topically 2 (two) times a day as needed for mild pain Please apply 5g twice daily as needed 35 44 g 0    metoprolol succinate (TOPROL-XL) 25 mg 24 hr tablet Take 1 tablet by mouth daily      ondansetron (ZOFRAN) 4 mg tablet Take 4 mg by mouth every 8 (eight) hours as needed for nausea or vomiting      ondansetron (ZOFRAN-ODT) 4 mg disintegrating tablet Take 1 tablet (4 mg total) by mouth every 8 (eight) hours as needed for nausea or vomiting 30 tablet 0    oxyCODONE-acetaminophen (PERCOCET) 7 5-325 MG per tablet Take 1 tablet by mouth every 6 (six) hours as needed (pain) Earliest Fill Date: 3/27/18 Max Daily Amount: 4 tablets 120 tablet 0    polyethylene glycol (GOLYTELY) 4000 mL solution Take 4,000 mL by mouth once for 1 dose 4000 mL 0    QUEtiapine (SEROquel) 50 mg tablet Take 1 tablet (50 mg total) by mouth daily at bedtime 90 tablet 0    sucralfate (CARAFATE) 1 g/10 mL suspension Take 10 mL by mouth 3 (three) times a day       No current facility-administered medications for this visit  She is allergic to bactrim [sulfamethoxazole-trimethoprim]; haldol [haloperidol]; ibuprofen; navane [thiothixene]; other; prozac [fluoxetine hcl]; and lexapro [escitalopram oxalate]       Review of Systems   Constitutional: Negative for chills, fatigue and fever     HENT: Negative for congestion, ear pain, hearing loss, rhinorrhea and sore throat  Eyes: Positive for visual disturbance  Negative for pain  Respiratory: Negative for cough, shortness of breath and wheezing  Cardiovascular: Negative for chest pain, palpitations and leg swelling  Gastrointestinal: Positive for abdominal pain  Negative for blood in stool, constipation, diarrhea, nausea and vomiting  Endocrine: Negative for cold intolerance, heat intolerance and polyuria  Genitourinary: Positive for pelvic pain  Negative for difficulty urinating, dysuria, frequency, hematuria and urgency  Musculoskeletal: Positive for arthralgias, back pain, gait problem and myalgias  Negative for joint swelling  Skin: Negative for rash and wound  Neurological: Positive for weakness and numbness  Negative for dizziness, syncope and headaches  Psychiatric/Behavioral: Negative for dysphoric mood and sleep disturbance  The patient is not nervous/anxious  Objective:      /72 (BP Location: Right arm, Patient Position: Sitting, Cuff Size: Large)   Pulse 96   Temp 98 2 °F (36 8 °C) (Tympanic)   Resp 18   Ht 5' 1" (1 549 m)   Wt 92 1 kg (203 lb)   LMP  (LMP Unknown)   SpO2 97%   BMI 38 36 kg/m²          Physical Exam   Constitutional: She is oriented to person, place, and time  She appears well-developed and well-nourished  No distress  Morbid obesity   Eyes: Conjunctivae and EOM are normal  Pupils are equal, round, and reactive to light  Cardiovascular: Normal rate, regular rhythm, normal heart sounds and normal pulses  Pulmonary/Chest: Effort normal and breath sounds normal  No tachypnea and no bradypnea  Abdominal: Soft  Normal appearance, normal aorta and bowel sounds are normal  She exhibits no distension  There is no hepatosplenomegaly  There is tenderness in the left upper quadrant and left lower quadrant  There is no CVA tenderness  Musculoskeletal: Normal range of motion  She exhibits no edema          Lumbar back: She exhibits tenderness, bony tenderness and spasm  She exhibits normal range of motion  Wearing bilateral knee braces  Strength 4/5 bilateral lower extremities  Neurological: She is alert and oriented to person, place, and time  She has normal reflexes  She is not disoriented  No cranial nerve deficit or sensory deficit  She exhibits normal muscle tone  Gait abnormal    Skin: Skin is warm and dry  She is not diaphoretic  Psychiatric: Her speech is normal and behavior is normal  Thought content normal  She exhibits a depressed mood  Nursing note and vitals reviewed

## 2018-04-13 NOTE — ASSESSMENT & PLAN NOTE
Pain is stable  Continue with Percocet 7 5-325 milligrams 4 times a day  Refilled gabapentin 300 milligrams 3 times a day

## 2018-04-13 NOTE — ASSESSMENT & PLAN NOTE
April 2018 A1c is 6 1  Patient states that she has not been using the NovoLog  Is currently using Lantus 22 units as needed when she notices her blood sugars are elevated  At this time since her A1c is under control she can continue with this regimen  Continue to check blood sugar daily

## 2018-04-16 ENCOUNTER — OFFICE VISIT (OUTPATIENT)
Dept: FAMILY MEDICINE CLINIC | Facility: CLINIC | Age: 48
End: 2018-04-16
Payer: COMMERCIAL

## 2018-04-16 VITALS
DIASTOLIC BLOOD PRESSURE: 90 MMHG | OXYGEN SATURATION: 96 % | HEIGHT: 61 IN | BODY MASS INDEX: 37.38 KG/M2 | TEMPERATURE: 97.1 F | WEIGHT: 198 LBS | HEART RATE: 106 BPM | SYSTOLIC BLOOD PRESSURE: 150 MMHG | RESPIRATION RATE: 18 BRPM

## 2018-04-16 DIAGNOSIS — B07.0 PLANTAR WART, RIGHT FOOT: Primary | ICD-10-CM

## 2018-04-16 PROCEDURE — 99213 OFFICE O/P EST LOW 20 MIN: CPT | Performed by: PODIATRIST

## 2018-04-16 NOTE — PROGRESS NOTES
Podiatry Clinic Visit  Kali Duval 52 y o  female MRN: [id number]  Encounter: [id number]    Assessment/Plan     Assessment:  1  Right plantar heel wart   2  Diabetes mellitus -A1c 6%    Plan:  - Patient was seen/examined  All questions and concerns addressed  - Right heel callus was trimmed down to pint point bleeding spot then cryotherapy was used to freeze the wart using the cup in the set  Patient tolerated the procedure well  - RTC in 2 weeks, we will apply cryotherapy in the same manner and monitor the size of the wart (this visit was 1 5cm x 2cm)  History of Present Illness     HPI:  Kali Duval is a 52 y o  female who presents with right plantar heel wart  She states this has been going on for 5 years now  She feels like this is getting worse  Would like a better treatment  Reports getting numbness and tingling from time to time  Her diabetes is well controlled on oral medication  The patient denies any nausea, vomiting, fever, chills, shortness of breath, or chest pains  Consults  Review of Systems   Constitutional: Negative  HENT: Negative  Eyes: Negative  Respiratory: Negative  Cardiovascular: Negative  Gastrointestinal: Negative      Musculoskeletal: Negative   Skin: Right heel wart   Neurological: numbness and tingling       Historical Information   Past Medical History:   Diagnosis Date    Anemia of chronic disease     Anxiety     Asthma     Asthma     Chronic abdominal pain     CKD (chronic kidney disease) stage 3, GFR 30-59 ml/min     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 )     Cushing syndrome [facility] )     Diabetes mellitus ([facility] )     DVT (deep venous thrombosis) ([facility] )     History of acute pancreatitis     felt secondary to Bactrim    HTN (hypertension)     Hyperlipidemia     Hypertension     Microscopic polyangiitis (HCC)     MPA (microscopic polyangiitis) (HCC)     Renal disorder     Self-inflicted injury     self inflicted skin wounds    Wegener's granulomatosis with renal involvement ([facility] [address] ) 2015     Past Surgical History:   Procedure Laterality Date    ESOPHAGOGASTRODUODENOSCOPY  09/11/2015    mild antral gastritis    RELEASE SCAR CONTRACTURE / GRAFT REPAIRS OF HAND       Social History   History   Alcohol Use No     History   Drug Use    Types: Marijuana     History   Smoking Status    Former Smoker    Types: Cigarettes    Quit date: 2010   Smokeless Tobacco    Never Used     Family History:   Family History   Problem Relation Age of Onset    Colon cancer Neg Hx     Drug abuse Neg Hx      mother father    Mental illness Neg Hx      disorder, mother father       Meds/Allergies     (Not in a hospital admission)  Allergies   Allergen Reactions    Bactrim [Sulfamethoxazole-Trimethoprim]      Pt "They think that is what cause the pancreatitis"     Haldol [Haloperidol] Other (See Comments)     "I don't like it"    Ibuprofen     Navane [Thiothixene]      SI    Other      "novaine?" antipsychotic    Prozac [Fluoxetine Hcl]      SI    Lexapro [Escitalopram Oxalate] Rash       Objective     Current Vitals:   Blood Pressure: 150/90 (04/16/18 0955)  Pulse: (!) 106 (04/16/18 0955)  Temperature: (!) 97 1 °F (36 2 °C) (04/16/18 0955)  Temp Source: Tympanic (04/16/18 0955)  Respirations: 18 (04/16/18 0955)  Height: 5' 1" (154 9 cm) (04/16/18 0955)  Weight - Scale: 89 8 kg (198 lb) (04/16/18 0955)  SpO2: 96 % (04/16/18 0955)        /90 (BP Location: Left arm, Patient Position: Sitting, Cuff Size: Standard)   Pulse (!) 106   Temp (!) 97 1 °F (36 2 °C) (Tympanic)   Resp 18   Ht 5' 1" (1 549 m)   Wt 89 8 kg (198 lb)   LMP  (LMP Unknown)   SpO2 96%   Breastfeeding? No   BMI 37 41 kg/m²       Lower Extremity Exam:    Musculoskeletal:  MMT is 5/5 to all compartments of the LE, +0/4 edema B/L, Digital ROM is intact, Right heel pain secondary to a wart      Pulses:   R DP is +2/4, R PT is +2/4, L DP is +2/4, L PT is +2/4, CFT< 3sec to all digits   Pedal hair is Present Skin:  No open Lesions  Skin of the LE is normal texture, turgor  The right heel plantar laterally wart measures 1 5cm x2cm post trimming  Neurologic:  Gross sensation is intact   Protective sensation is Intact

## 2018-04-17 ENCOUNTER — TELEPHONE (OUTPATIENT)
Dept: FAMILY MEDICINE CLINIC | Facility: CLINIC | Age: 48
End: 2018-04-17

## 2018-04-17 DIAGNOSIS — R14.0 BLOATING: Primary | ICD-10-CM

## 2018-04-17 RX ORDER — SIMETHICONE 125 MG
125 TABLET,CHEWABLE ORAL EVERY 6 HOURS PRN
Qty: 40 TABLET | Refills: 0 | Status: SHIPPED | OUTPATIENT
Start: 2018-04-17 | End: 2018-05-29 | Stop reason: SDUPTHER

## 2018-04-17 NOTE — TELEPHONE ENCOUNTER
Hard for me to tell exactly what is going on with her, without seen her  I can send over prescription for simethicone to see if this will help with some of the symptoms

## 2018-04-23 ENCOUNTER — TELEPHONE (OUTPATIENT)
Dept: FAMILY MEDICINE CLINIC | Facility: CLINIC | Age: 48
End: 2018-04-23

## 2018-04-23 NOTE — TELEPHONE ENCOUNTER
[employer] Script for battery replacement for scooter was sent as per pt  Was this form received? I will also call [address]

## 2018-04-24 DIAGNOSIS — G89.29 CHRONIC LOW BACK PAIN WITH BILATERAL SCIATICA, UNSPECIFIED BACK PAIN LATERALITY: Primary | ICD-10-CM

## 2018-04-24 DIAGNOSIS — M54.42 CHRONIC LOW BACK PAIN WITH BILATERAL SCIATICA, UNSPECIFIED BACK PAIN LATERALITY: Primary | ICD-10-CM

## 2018-04-24 DIAGNOSIS — M54.41 CHRONIC LOW BACK PAIN WITH BILATERAL SCIATICA, UNSPECIFIED BACK PAIN LATERALITY: Primary | ICD-10-CM

## 2018-04-24 RX ORDER — TIZANIDINE HYDROCHLORIDE 4 MG/1
4 CAPSULE, GELATIN COATED ORAL 3 TIMES DAILY
Qty: 90 CAPSULE | Refills: 2 | Status: SHIPPED | OUTPATIENT
Start: 2018-04-24 | End: 2018-07-13 | Stop reason: SDUPTHER

## 2018-04-24 NOTE — TELEPHONE ENCOUNTER
LM on VM of [employer] -looking for - [employer] Form for - wheelchair - Battery Replacement (DME)     (will call Serafin Chan at [phone] to refax)

## 2018-04-26 ENCOUNTER — TELEPHONE (OUTPATIENT)
Dept: FAMILY MEDICINE CLINIC | Facility: CLINIC | Age: 48
End: 2018-04-26

## 2018-04-26 ENCOUNTER — OFFICE VISIT (OUTPATIENT)
Dept: FAMILY MEDICINE CLINIC | Facility: CLINIC | Age: 48
End: 2018-04-26
Payer: COMMERCIAL

## 2018-04-26 VITALS
HEIGHT: 61 IN | HEART RATE: 118 BPM | WEIGHT: 206.31 LBS | OXYGEN SATURATION: 98 % | SYSTOLIC BLOOD PRESSURE: 148 MMHG | BODY MASS INDEX: 38.95 KG/M2 | TEMPERATURE: 98.2 F | RESPIRATION RATE: 18 BRPM | DIASTOLIC BLOOD PRESSURE: 98 MMHG

## 2018-04-26 DIAGNOSIS — M77.8 TENDINITIS OF THUMB: ICD-10-CM

## 2018-04-26 DIAGNOSIS — J30.1 SEASONAL ALLERGIC RHINITIS DUE TO POLLEN: Primary | ICD-10-CM

## 2018-04-26 PROCEDURE — 3725F SCREEN DEPRESSION PERFORMED: CPT | Performed by: PHYSICIAN ASSISTANT

## 2018-04-26 PROCEDURE — 99213 OFFICE O/P EST LOW 20 MIN: CPT | Performed by: PHYSICIAN ASSISTANT

## 2018-04-26 RX ORDER — DEXTROAMPHETAMINE SACCHARATE, AMPHETAMINE ASPARTATE MONOHYDRATE, DEXTROAMPHETAMINE SULFATE AND AMPHETAMINE SULFATE 5; 5; 5; 5 MG/1; MG/1; MG/1; MG/1
20 CAPSULE, EXTENDED RELEASE ORAL 2 TIMES DAILY
Qty: 60 CAPSULE | Refills: 0 | Status: CANCELLED | OUTPATIENT
Start: 2018-04-26

## 2018-04-26 RX ORDER — CETIRIZINE HYDROCHLORIDE 10 MG/1
10 TABLET ORAL DAILY
Qty: 90 TABLET | Refills: 0 | Status: ON HOLD | OUTPATIENT
Start: 2018-04-26 | End: 2018-12-14

## 2018-04-26 RX ORDER — OXYCODONE AND ACETAMINOPHEN 7.5; 325 MG/1; MG/1
1 TABLET ORAL EVERY 6 HOURS PRN
Qty: 120 TABLET | Refills: 0 | Status: CANCELLED | OUTPATIENT
Start: 2018-04-26

## 2018-04-26 NOTE — TELEPHONE ENCOUNTER
PT IS CALLING IN ADVANCE FOR MEDICATION REFILL FOR      ADDERALL 20MG  OXY  3 25MG          PHARMACY- [facility]

## 2018-04-26 NOTE — ASSESSMENT & PLAN NOTE
Will start on Zyrtec to see if this will help with patient's symptoms  Discussed with patient that there are several and dates need out there, and may have to try other wants to get control of symptoms  Depending on what she is allergic to she may not need to use this year round  Recommend taking for the next month, then discontinuing medication  If symptoms come back then will restart medication  If there is no improvement symptoms make a follow-up appointment

## 2018-04-26 NOTE — PROGRESS NOTES
Assessment/Plan:    Seasonal allergic rhinitis due to pollen  Will start on Zyrtec to see if this will help with patient's symptoms  Discussed with patient that there are several and dates need out there, and may have to try other wants to get control of symptoms  Depending on what she is allergic to she may not need to use this year round  Recommend taking for the next month, then discontinuing medication  If symptoms come back then will restart medication  If there is no improvement symptoms make a follow-up appointment  Believe pain and left thumb is due to a tendinitis, from either overuse or possibly trauma from a recent fall  At this time would recommend RICE  Would use splint to help with stability  Would recommend ibuprofen to be used as needed to help with the inflammation  If there is no improvement in 2 weeks would recommend following up with physical therapy  Diagnoses and all orders for this visit:    Seasonal allergic rhinitis due to pollen  -     cetirizine (ZyrTEC) 10 mg tablet; Take 1 tablet (10 mg total) by mouth daily    Tendinitis of thumb  -     Discontinue: Elastic Bandages & Supports (THUMB SPLINT/LEFT MEDIUM) MISC; by Does not apply route daily  -     Elastic Bandages & Supports (THUMB SPLINT/LEFT MEDIUM) MISC; by Does not apply route daily    Other orders  -     Cancel: oxyCODONE-acetaminophen (PERCOCET) 7 5-325 MG per tablet; Take 1 tablet by mouth every 6 (six) hours as needed (pain) Max Daily Amount: 4 tablets  -     Cancel: amphetamine-dextroamphetamine (ADDERALL XR) 20 MG 24 hr capsule; Take 1 capsule (20 mg total) by mouth 2 (two) times a day Max Daily Amount: 40 mg          Subjective:      Patient ID: Stephani Ozuna is a 52 y o  female  52year-old female presenting for concerns with sinuses and and left hand pain  Patient states over the last week she has been having a lot of sinus issues  Is complaining of rhinorrhea, sinus pressure, sneezing, itchy eyes  States that she has been diagnosed with allergic rhinitis in the past, but has never been prescribed any medication  Occasionally takes Benadryl over-the-counter, which she states helps with her symptoms sometimes  Denies any fever, chills, sore throat, difficulty breathing  Patient has been experiencing left thumb pain x2 weeks  Describes as a sharp pain at the 1st MCP joint of the left hand  States that the pain comes and goes throughout the day  Pain does radiate down to the wrist   Denies any erythema or swelling in this area  Pain is made worse when using her hand for extended period time  Has had drop things due to the pain  Denies any trauma to the left hand, but states she has had recent falls due to the cataplexy and trying to catch herself  Patient is also requesting letter to have a it is sister on the [place] [id number]  The following portions of the patient's history were reviewed and updated as appropriate:   She  has a past medical history of Anemia of chronic disease; Anxiety; Asthma; Asthma; Chronic abdominal pain; CKD (chronic kidney disease) stage 3, GFR 30-59 ml/min; 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[address] ); Cushing syndrome ([address] ); Diabetes mellitus ([address] ); DVT (deep venous thrombosis) ([address] ); History of acute pancreatitis; HTN (hypertension); Hyperlipidemia; Hypertension; Microscopic polyangiitis ([address] ); MPA (microscopic polyangiitis) ([address] ); Renal disorder; Self-inflicted injury; and Wegener's granulomatosis with renal involvement ([address] ) (2015)    She   Patient Active Problem List    Diagnosis Date Noted    Seasonal allergic rhinitis due to pollen 04/26/2018    Plantar wart, right foot 04/16/2018    Chronic pelvic pain in female 04/13/2018    Diarrhea of presumed infectious origin 03/28/2018    Epigastric pain 01/07/2018    Pancreatitis 01/07/2018    Ovarian cyst 01/07/2018    Postherpetic neuralgia 01/07/2018    Bipolar 1 disorder ([address] ) 12/21/2017    Chronic flank pain 12/21/2017    Chronic pain 07/25/2017    Noncompliance 07/25/2017    Cataplexy 12/07/2016    Weakness of both lower extremities 11/29/2016    Acute pancreatitis 07/24/2016    CKD (chronic kidney disease) stage 3, GFR 30-59 ml/min     MPA (microscopic polyangiitis) (HCC)     Asthma     HTN (hypertension)     Arthralgia of multiple joints 05/24/2016    IDDM (insulin dependent diabetes mellitus) ([facility] ) 04/06/2016    Dyslipidemia 04/06/2016    Wegener's granulomatosis ([facility] ) 04/06/2016    Anemia of chronic disease 07/01/2015     She  has a past surgical history that includes Esophagogastroduodenoscopy (09/11/2015) and Release scar contracture / graft repairs of hand  Her family history is not on file  She  reports that she quit smoking about 8 years ago  Her smoking use included Cigarettes  She has never used smokeless tobacco  She reports that she uses drugs, including Marijuana  She reports that she does not drink alcohol    Current Outpatient Prescriptions   Medication Sig Dispense Refill    amphetamine-dextroamphetamine (ADDERALL XR) 20 MG 24 hr capsule Take 1 capsule (20 mg total) by mouth 2 (two) times a day Max Daily Amount: 40 mg 60 capsule 0    cetirizine (ZyrTEC) 10 mg tablet Take 1 tablet (10 mg total) by mouth daily 90 tablet 0    cycloSPORINE (RESTASIS) 0 05 % ophthalmic emulsion Administer 1 drop to both eyes 2 (two) times a day      Elastic Bandages & Supports (THUMB SPLINT/LEFT MEDIUM) MISC by Does not apply route daily 1 each 0    gabapentin (NEURONTIN) 300 mg capsule Take 1 capsule (300 mg total) by mouth 3 (three) times a day 270 capsule 1    glucose blood (FREESTYLE LITE) test strip 1 each by Other route 3 (three) times a day 100 each 5    Humidifiers (COOL MIST HUMIDIFIER 1 GALLON) MISC by Does not apply route as needed (shortness of breath) 1 each 0    insulin aspart (NovoLOG) 100 Units/mL SOPN Inject 6 Units under the skin 3 (three) times a day with meals 5 pen 1    insulin glargine (LANTUS SOLOSTAR) injection pen 100 units/mL Inject 0 22 mL (22 Units total) under the skin daily at bedtime 5 pen 1    Insulin Pen Needle (PEN NEEDLES) 31G X 8 MM MISC Inject 1 Stick under the skin 4 (four) times a day (with meals and at bedtime) 100 each 6    Lancets (FREESTYLE) lancets by Other route 3 (three) times a day 100 each 11    lidocaine (XYLOCAINE) 5 % ointment Apply topically 2 (two) times a day as needed for mild pain Please apply 5g twice daily as needed 35 44 g 0    metoprolol succinate (TOPROL-XL) 25 mg 24 hr tablet Take 1 tablet by mouth daily      ondansetron (ZOFRAN) 4 mg tablet Take 4 mg by mouth every 8 (eight) hours as needed for nausea or vomiting      ondansetron (ZOFRAN-ODT) 4 mg disintegrating tablet Take 1 tablet (4 mg total) by mouth every 8 (eight) hours as needed for nausea or vomiting 30 tablet 0    oxyCODONE-acetaminophen (PERCOCET) 7 5-325 MG per tablet Take 1 tablet by mouth every 6 (six) hours as needed (pain) Earliest Fill Date: 3/27/18 Max Daily Amount: 4 tablets 120 tablet 0    polyethylene glycol (GOLYTELY) 4000 mL solution Take 4,000 mL by mouth once for 1 dose 4000 mL 0    QUEtiapine (SEROquel) 50 mg tablet Take 1 tablet (50 mg total) by mouth daily at bedtime 90 tablet 0    simethicone (MYLICON) 923 MG chewable tablet Chew 1 tablet (125 mg total) every 6 (six) hours as needed for flatulence 40 tablet 0    sucralfate (CARAFATE) 1 g/10 mL suspension Take 10 mL by mouth 3 (three) times a day      TiZANidine (ZANAFLEX) 4 MG capsule Take 1 capsule (4 mg total) by mouth 3 (three) times a day 90 capsule 2     No current facility-administered medications for this visit  She is allergic to bactrim [sulfamethoxazole-trimethoprim]; haldol [haloperidol]; ibuprofen; navane [thiothixene]; other; prozac [fluoxetine hcl]; and lexapro [escitalopram oxalate]       Review of Systems   Constitutional: Negative for chills, fatigue and fever     HENT: Positive for congestion, postnasal drip, rhinorrhea, sinus pressure and sneezing  Negative for sore throat  Eyes: Positive for itching  Respiratory: Negative for cough, shortness of breath and wheezing  Cardiovascular: Negative for chest pain  Musculoskeletal:        See HPI         Objective:      /98 (BP Location: Right arm, Patient Position: Sitting, Cuff Size: Standard)   Pulse (!) 118   Temp 98 2 °F (36 8 °C) (Tympanic)   Resp 18   Ht 5' 1" (1 549 m)   Wt 93 6 kg (206 lb 5 oz)   LMP  (LMP Unknown)   SpO2 98%   BMI 38 98 kg/m²          Physical Exam   Constitutional: She appears well-developed and well-nourished  No distress  HENT:   Right Ear: Hearing, tympanic membrane, external ear and ear canal normal    Left Ear: Hearing, tympanic membrane, external ear and ear canal normal    Nose: Mucosal edema and rhinorrhea present  Mouth/Throat: Oropharynx is clear and moist and mucous membranes are normal    Cardiovascular: Normal rate, regular rhythm and normal heart sounds  Exam reveals no gallop and no friction rub  No murmur heard  Pulmonary/Chest: Effort normal and breath sounds normal  No respiratory distress  She has no wheezes  She has no rales  Musculoskeletal:        Left hand: She exhibits tenderness and bony tenderness  She exhibits normal range of motion and no swelling  Hands:  Skin: She is not diaphoretic

## 2018-04-26 NOTE — LETTER
April 26, 2018     Patient: Sherrie Serrano   YOB: 1970   Date of Visit: 4/26/2018       To Whom it May Concern:    Sherrie Serrano is under my professional care  She was seen in my office on 4/26/2018  She uses [employer] for transportation to her appointments  Because of her difficulty with ambulating, and diagnosis of bipolar, she needs an aid to travel with her to her appointments  If you have any questions or concerns, please don't hesitate to call           Sincerely,          Carolann Bishop PA-C        CC: No Recipients

## 2018-04-27 DIAGNOSIS — F31.9 BIPOLAR 1 DISORDER (HCC): ICD-10-CM

## 2018-04-27 DIAGNOSIS — G89.4 CHRONIC PAIN SYNDROME: ICD-10-CM

## 2018-04-27 DIAGNOSIS — M31.30 WEGENER'S GRANULOMATOSIS: Chronic | ICD-10-CM

## 2018-04-30 ENCOUNTER — OFFICE VISIT (OUTPATIENT)
Dept: FAMILY MEDICINE CLINIC | Facility: CLINIC | Age: 48
End: 2018-04-30
Payer: COMMERCIAL

## 2018-04-30 ENCOUNTER — TELEPHONE (OUTPATIENT)
Dept: FAMILY MEDICINE CLINIC | Facility: CLINIC | Age: 48
End: 2018-04-30

## 2018-04-30 VITALS
HEART RATE: 86 BPM | BODY MASS INDEX: 37.76 KG/M2 | RESPIRATION RATE: 20 BRPM | TEMPERATURE: 96.8 F | SYSTOLIC BLOOD PRESSURE: 120 MMHG | OXYGEN SATURATION: 96 % | HEIGHT: 61 IN | DIASTOLIC BLOOD PRESSURE: 78 MMHG | WEIGHT: 200 LBS

## 2018-04-30 DIAGNOSIS — B07.0 PLANTAR WART, RIGHT FOOT: Primary | ICD-10-CM

## 2018-04-30 DIAGNOSIS — M31.30 WEGENER'S GRANULOMATOSIS: Chronic | ICD-10-CM

## 2018-04-30 DIAGNOSIS — G89.4 CHRONIC PAIN SYNDROME: ICD-10-CM

## 2018-04-30 DIAGNOSIS — R11.0 NAUSEA: Primary | ICD-10-CM

## 2018-04-30 DIAGNOSIS — F31.9 BIPOLAR 1 DISORDER (HCC): ICD-10-CM

## 2018-04-30 PROCEDURE — 99212 OFFICE O/P EST SF 10 MIN: CPT | Performed by: PODIATRIST

## 2018-04-30 RX ORDER — DEXTROAMPHETAMINE SACCHARATE, AMPHETAMINE ASPARTATE MONOHYDRATE, DEXTROAMPHETAMINE SULFATE AND AMPHETAMINE SULFATE 5; 5; 5; 5 MG/1; MG/1; MG/1; MG/1
20 CAPSULE, EXTENDED RELEASE ORAL 2 TIMES DAILY
Qty: 60 CAPSULE | Refills: 0 | Status: SHIPPED | OUTPATIENT
Start: 2018-04-30 | End: 2018-05-29 | Stop reason: SDUPTHER

## 2018-04-30 RX ORDER — ONDANSETRON 4 MG/1
4 TABLET, FILM COATED ORAL EVERY 8 HOURS PRN
Qty: 20 TABLET | Refills: 0 | Status: SHIPPED | OUTPATIENT
Start: 2018-04-30 | End: 2018-06-01 | Stop reason: SDUPTHER

## 2018-04-30 RX ORDER — OXYCODONE AND ACETAMINOPHEN 7.5; 325 MG/1; MG/1
1 TABLET ORAL EVERY 6 HOURS PRN
Qty: 120 TABLET | Refills: 0 | Status: SHIPPED | OUTPATIENT
Start: 2018-04-30 | End: 2018-05-29 | Stop reason: SDUPTHER

## 2018-04-30 RX ORDER — OXYCODONE AND ACETAMINOPHEN 7.5; 325 MG/1; MG/1
1 TABLET ORAL EVERY 6 HOURS PRN
Qty: 120 TABLET | Refills: 0 | Status: CANCELLED | OUTPATIENT
Start: 2018-04-30

## 2018-04-30 RX ORDER — DEXTROAMPHETAMINE SACCHARATE, AMPHETAMINE ASPARTATE MONOHYDRATE, DEXTROAMPHETAMINE SULFATE AND AMPHETAMINE SULFATE 5; 5; 5; 5 MG/1; MG/1; MG/1; MG/1
20 CAPSULE, EXTENDED RELEASE ORAL 2 TIMES DAILY
Qty: 60 CAPSULE | Refills: 0 | Status: CANCELLED | OUTPATIENT
Start: 2018-04-30

## 2018-04-30 NOTE — PROGRESS NOTES
Podiatry Clinic Visit  Naseem Mae 52 y o  female MRN: [id number]  Encounter: [id number]    Assessment/Plan     Assessment:  1  R heel plantar verruca  2  DM type 2    Plan:  - Patient was seen/examined  All questions and concerns addressed  - right heel callus trimmed down to pin point bleeding then cryotherapy was applied without incident  - RTC in 3 weeks      History of Present Illness     HPI:  Naseem Mae is a 52 y o  female who presents with right heel wart  She states she has had it for about 3 years  She was last here 2 weeks ago and feels that the pain has improved  The patient denies any nausea, vomiting, fever, chills, shortness of breath, or chest pains  Consults  Review of Systems   Constitutional: Negative  HENT: Negative  Eyes: Negative  Respiratory: Negative  Cardiovascular: Negative  Gastrointestinal: Negative  Musculoskeletal: Negative   Skin: right heel plantar wart  Neurological: Negative          Historical Information   Past Medical History:   Diagnosis Date    Anemia of chronic disease     Anxiety     Asthma     Asthma     Chronic abdominal pain     CKD (chronic kidney disease) stage 3, GFR 30-59 ml/min     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     [facility]ushing syndrome [facility]     Diabetes mellitus [facility]     [facility]     History of acute pancreatitis     felt secondary to Bactrim    HTN (hypertension)     Hyperlipidemia     Hypertension     Microscopic polyangiitis (HCC)     M[facility]) [facility]     Renal disorder     Self-inflicted injury     self inflicted skin wounds    Wegener's granulomatosis with renal involvement ([facility] ) 2015     Past Surgical History:   Procedure Laterality Date    ESOPHAGOGASTRODUODENOSCOPY  09/11/2015    mild antral gastritis    RELEASE SCAR CONTRACTURE / GRAFT REPAIRS OF HAND       Social History   History   Alcohol Use No     History   Drug Use    Types: Marijuana     History   Smoking Status    Former Smoker    Types: Cigarettes    Quit date: 2010   Smokeless Tobacco    Never Used     Family History:   Family History   Problem Relation Age of Onset    Colon cancer Neg Hx     Drug abuse Neg Hx      mother father    Mental illness Neg Hx      disorder, mother father       Meds/Allergies     (Not in a hospital admission)  Allergies   Allergen Reactions    Bactrim [Sulfamethoxazole-Trimethoprim]      Pt "They think that is what cause the pancreatitis"     Haldol [Haloperidol] Other (See Comments)     "I don't like it"    Ibuprofen     Navane [Thiothixene]      SI    Other      "novaine?" antipsychotic    Prozac [Fluoxetine Hcl]      SI    Lexapro [Escitalopram Oxalate] Rash       Objective     Current Vitals:   Blood Pressure: 120/78 (04/30/18 0907)  Pulse: 86 (04/30/18 0907)  Temperature: (!) 96 8 °F (36 °C) (04/30/18 0907)  Temp Source: Tympanic (04/30/18 0907)  Respirations: 20 (04/30/18 0907)  Height: 5' 1" (154 9 cm) (04/30/18 0907)  Weight - Scale: 90 7 kg (200 lb) (04/30/18 0907)  SpO2: 96 % (04/30/18 0907)        /78   Pulse 86   Temp (!) 96 8 °F (36 °C) (Tympanic)   Resp 20   Ht 5' 1" (1 549 m)   Wt 90 7 kg (200 lb)   LMP  (LMP Unknown)   SpO2 96%   BMI 37 79 kg/m²       Lower Extremity Exam:    Musculoskeletal:  MMT is 5/5 to all compartments of the LE, +0/4 edema B/L, Digital ROM is intact,      Pulses:   R DP is +2/4, R PT is +2/4, L DP is +2/4, L PT is +2/4, CFT< 3sec to all digits  Pedal hair is Present     Skin:  No open Lesions  Skin of the LE is normal texture, turgor  Neurologic:  Gross sensation is intact   Protective sensation is Intact

## 2018-05-02 ENCOUNTER — TELEPHONE (OUTPATIENT)
Dept: FAMILY MEDICINE CLINIC | Facility: CLINIC | Age: 48
End: 2018-05-02

## 2018-05-02 NOTE — TELEPHONE ENCOUNTER
Form from [employer] received from central faxing for repairs and battery  form placed in your bin for signing or processing   Form has to be faxed back to Paul Cormier at [phone]

## 2018-05-16 ENCOUNTER — TELEPHONE (OUTPATIENT)
Dept: NEPHROLOGY | Facility: CLINIC | Age: 48
End: 2018-05-16

## 2018-05-16 NOTE — TELEPHONE ENCOUNTER
Dr Faulkner Due last note in January said continue to hold ACE so seems like she shouldn't be on lisinopril from his standpoint but could you call patient and verify that someone else did not restart this?

## 2018-05-16 NOTE — TELEPHONE ENCOUNTER
Pharmacy requesting lisinopril 2 5mg 1 tab day  A 90 day supply don't see it on pts medication list please advise

## 2018-05-21 ENCOUNTER — TELEPHONE (OUTPATIENT)
Dept: NEPHROLOGY | Facility: CLINIC | Age: 48
End: 2018-05-21

## 2018-05-21 ENCOUNTER — OFFICE VISIT (OUTPATIENT)
Dept: FAMILY MEDICINE CLINIC | Facility: CLINIC | Age: 48
End: 2018-05-21
Payer: COMMERCIAL

## 2018-05-21 VITALS
HEART RATE: 76 BPM | OXYGEN SATURATION: 96 % | HEIGHT: 61 IN | WEIGHT: 204 LBS | SYSTOLIC BLOOD PRESSURE: 110 MMHG | RESPIRATION RATE: 18 BRPM | TEMPERATURE: 96.4 F | DIASTOLIC BLOOD PRESSURE: 60 MMHG | BODY MASS INDEX: 38.51 KG/M2

## 2018-05-21 DIAGNOSIS — B07.0 PLANTAR WART, RIGHT FOOT: Primary | ICD-10-CM

## 2018-05-21 PROCEDURE — 99212 OFFICE O/P EST SF 10 MIN: CPT | Performed by: PODIATRIST

## 2018-05-21 NOTE — PROGRESS NOTES
Podiatry Clinic Visit  Renae Serrano 52 y o  female MRN: [id number]  Encounter: [id number]    Assessment/Plan     Assessment:  1  R heel plantar verruca  2  DM type 2    Plan:  - Patient was seen/examined  All questions and concerns addressed  - R heel callus trimmed using sterile 15 blade without incident and then cryotherapy was applied without incident  -advised pt to possibly see dermatologist for consultation for this wart as it is not very responsive to cryotherapy  - RTC as needed      History of Present Illness     HPI:  Renae Serrano is a 52 y o  female who presents with right heel wart  She was last here on 4/30/18   She has had this wart for several years  She states this it is still quite painful even when open to air  The patient denies any nausea, vomiting, fever, chills, shortness of breath, or chest pains  Consults  Review of Systems   Constitutional: Negative  HENT: Negative  Eyes: Negative  Respiratory: Negative  Cardiovascular: Negative  Gastrointestinal: Negative  Musculoskeletal: Negative   Skin: R heel wart  Neurological: Negative          Historical Information   Past Medical History:   Diagnosis Date    Anemia of chronic disease     Anxiety     Asthma     Asthma     Chronic abdominal pain     CKD (chronic kidney disease) stage 3, GFR 30-59 ml/min     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[address] )     [facility]     Diabetes mellitus [facility]     DVT (deep venous thrombosis) ([facility]     History of acute pancreatitis     felt secondary to Bactrim    HTN (hypertension)     Hyperlipidemia     Hypertension     Microscopic polyangiitis (HCC)     MPA (microscopic polyangiitis) (HCC)     Renal disorder     Self-inflicted injury     self inflicted skin wounds    Wegener's granulomatosis with renal involvement ([facility] ) 2015     Past Surgical History:   Procedure Laterality Date    ESOPHAGOGASTRODUODENOSCOPY  09/11/2015    mild antral gastritis    RELEASE SCAR CONTRACTURE / GRAFT REPAIRS OF HAND       Social History   History   Alcohol Use No     History   Drug Use    Types: Marijuana     History   Smoking Status    Former Smoker    Types: Cigarettes    Quit date: 2010   Smokeless Tobacco    Never Used     Family History:   Family History   Problem Relation Age of Onset    Colon cancer Neg Hx     Drug abuse Neg Hx      mother father    Mental illness Neg Hx      disorder, mother father       Meds/Allergies     (Not in a hospital admission)  Allergies   Allergen Reactions    Bactrim [Sulfamethoxazole-Trimethoprim]      Pt "They think that is what cause the pancreatitis"     Haldol [Haloperidol] Other (See Comments)     "I don't like it"    Ibuprofen     Navane [Thiothixene]      SI    Other      "novaine?" antipsychotic    Prozac [Fluoxetine Hcl]      SI    Lexapro [Escitalopram Oxalate] Rash       Objective     Current Vitals:   Blood Pressure: 110/60 (05/21/18 0844)  Pulse: 76 (05/21/18 0844)  Temperature: (!) 96 4 °F (35 8 °C) (05/21/18 0844)  Temp Source: Tympanic (05/21/18 0844)  Respirations: 18 (05/21/18 0844)  Height: 5' 1" (154 9 cm) (05/21/18 0844)  Weight - Scale: 92 5 kg (204 lb) (05/21/18 0844)  SpO2: 96 % (05/21/18 0844)        /60   Pulse 76   Temp (!) 96 4 °F (35 8 °C) (Tympanic)   Resp 18   Ht 5' 1" (1 549 m)   Wt 92 5 kg (204 lb)   LMP  (LMP Unknown)   SpO2 96%   BMI 38 55 kg/m²       Lower Extremity Exam:    Musculoskeletal:  MMT is 5/5 to all compartments of the LE, +0/4 edema B/L, Digital ROM is intact,      Pulses:   R DP is +2/4, R PT is +2/4, L DP is +2/4, L PT is +2/4, CFT< 3sec to all digits  Pedal hair is Present     Skin:  R heel plantar verruca approximately nickel-size  No open Lesions  Skin of the LE is normal texture, turgor  Neurologic:  Gross sensation is intact   Protective sensation is Intact

## 2018-05-21 NOTE — TELEPHONE ENCOUNTER
Per pt she stated that pcp took her off lisinopril but her bp's are Friday 5 18 206/126 mon 5/14 196/110 and she didn't get any other medication for it please advise   [phone]

## 2018-05-21 NOTE — TELEPHONE ENCOUNTER
It looks like she was PCP today and BP was 110/60  Can you ask her to take 1 week of BP readings at home am and pm and then send them in     Thanks

## 2018-05-22 DIAGNOSIS — F31.9 BIPOLAR 1 DISORDER (HCC): ICD-10-CM

## 2018-05-22 RX ORDER — QUETIAPINE FUMARATE 50 MG/1
50 TABLET, FILM COATED ORAL
Qty: 90 TABLET | Refills: 1 | Status: SHIPPED | OUTPATIENT
Start: 2018-05-22 | End: 2018-06-11 | Stop reason: SDUPTHER

## 2018-05-23 ENCOUNTER — ANESTHESIA EVENT (OUTPATIENT)
Dept: GASTROENTEROLOGY | Facility: HOSPITAL | Age: 48
End: 2018-05-23

## 2018-05-23 NOTE — ANESTHESIA PREPROCEDURE EVALUATION
Review of Systems/Medical History  Patient summary reviewed  Chart reviewed  No history of anesthetic complications     Cardiovascular  Hyperlipidemia, Hypertension ,    Pulmonary  Asthma ,        GI/Hepatic      Comment: h/o pancreatitis     Chronic kidney disease stage 3,        Endo/Other  Diabetes type 1 ,      GYN       Hematology   Musculoskeletal    Arthritis     Neurology   Psychology   Anxiety, Depression , being treated for depression,                   Anesthesia Plan  ASA Score- 3     Anesthesia Type- general and IV sedation with anesthesia with ASA Monitors  Additional Monitors:   Airway Plan:         Plan Factors-    Induction- intravenous  Postoperative Plan-     Informed Consent- Anesthetic plan and risks discussed with patient

## 2018-05-23 NOTE — TELEPHONE ENCOUNTER
Called pt she stated that she started taking her bp's on 5/22 5/22 Am 196/120          Evening 130/89           Bed time 146/89      5/23  Am 157/93      Per pt she will continue to monitor her bp's and call in Friday with more readings

## 2018-05-24 ENCOUNTER — ANESTHESIA (OUTPATIENT)
Dept: GASTROENTEROLOGY | Facility: HOSPITAL | Age: 48
End: 2018-05-24

## 2018-05-25 ENCOUNTER — TELEPHONE (OUTPATIENT)
Dept: NEPHROLOGY | Facility: CLINIC | Age: 48
End: 2018-05-25

## 2018-05-25 NOTE — TELEPHONE ENCOUNTER
Pt called with BP readings states shas been taking her blood pressure 3 times a day:     5/22/18- 196/120     130/89     146/89    5/23/18-  157/93 (8 am)      146/106 (10 am)      145/91 (evening)                 137/93 (bedtime)    5/24/18-  170/96      149/111      151/105    5/25/18-  150/101  After OT- 148/108  After PT-  137/112

## 2018-05-29 ENCOUNTER — OFFICE VISIT (OUTPATIENT)
Dept: FAMILY MEDICINE CLINIC | Facility: CLINIC | Age: 48
End: 2018-05-29
Payer: COMMERCIAL

## 2018-05-29 VITALS
WEIGHT: 200.31 LBS | TEMPERATURE: 97.5 F | BODY MASS INDEX: 37.82 KG/M2 | OXYGEN SATURATION: 95 % | HEIGHT: 61 IN | HEART RATE: 84 BPM | DIASTOLIC BLOOD PRESSURE: 80 MMHG | RESPIRATION RATE: 18 BRPM | SYSTOLIC BLOOD PRESSURE: 150 MMHG

## 2018-05-29 DIAGNOSIS — M79.641 BILATERAL HAND PAIN: Primary | ICD-10-CM

## 2018-05-29 DIAGNOSIS — R14.0 BLOATING: ICD-10-CM

## 2018-05-29 DIAGNOSIS — I10 ESSENTIAL HYPERTENSION: Primary | ICD-10-CM

## 2018-05-29 DIAGNOSIS — G89.4 CHRONIC PAIN SYNDROME: ICD-10-CM

## 2018-05-29 DIAGNOSIS — F31.9 BIPOLAR 1 DISORDER (HCC): ICD-10-CM

## 2018-05-29 DIAGNOSIS — M31.30 WEGENER'S GRANULOMATOSIS: Chronic | ICD-10-CM

## 2018-05-29 DIAGNOSIS — J30.1 SEASONAL ALLERGIC RHINITIS DUE TO POLLEN: ICD-10-CM

## 2018-05-29 DIAGNOSIS — M79.642 BILATERAL HAND PAIN: Primary | ICD-10-CM

## 2018-05-29 PROCEDURE — 99214 OFFICE O/P EST MOD 30 MIN: CPT | Performed by: PHYSICIAN ASSISTANT

## 2018-05-29 RX ORDER — OXYCODONE AND ACETAMINOPHEN 7.5; 325 MG/1; MG/1
1 TABLET ORAL EVERY 6 HOURS PRN
Qty: 120 TABLET | Refills: 0 | Status: SHIPPED | OUTPATIENT
Start: 2018-05-29 | End: 2018-06-25 | Stop reason: SDUPTHER

## 2018-05-29 RX ORDER — DEXTROAMPHETAMINE SACCHARATE, AMPHETAMINE ASPARTATE MONOHYDRATE, DEXTROAMPHETAMINE SULFATE AND AMPHETAMINE SULFATE 5; 5; 5; 5 MG/1; MG/1; MG/1; MG/1
20 CAPSULE, EXTENDED RELEASE ORAL 2 TIMES DAILY
Qty: 60 CAPSULE | Refills: 0 | Status: SHIPPED | OUTPATIENT
Start: 2018-05-29 | End: 2018-06-25 | Stop reason: SDUPTHER

## 2018-05-29 RX ORDER — OXYCODONE AND ACETAMINOPHEN 7.5; 325 MG/1; MG/1
1 TABLET ORAL EVERY 6 HOURS PRN
Qty: 120 TABLET | Refills: 0 | Status: SHIPPED | OUTPATIENT
Start: 2018-05-29 | End: 2018-05-29 | Stop reason: SDUPTHER

## 2018-05-29 RX ORDER — SIMETHICONE 125 MG
125 TABLET,CHEWABLE ORAL EVERY 6 HOURS PRN
Qty: 40 TABLET | Refills: 2 | Status: SHIPPED | OUTPATIENT
Start: 2018-05-29 | End: 2018-06-05 | Stop reason: SDUPTHER

## 2018-05-29 RX ORDER — AMLODIPINE BESYLATE 5 MG/1
5 TABLET ORAL DAILY
Qty: 90 TABLET | Refills: 2 | Status: ON HOLD | OUTPATIENT
Start: 2018-05-29 | End: 2018-12-14

## 2018-05-29 RX ORDER — DEXTROAMPHETAMINE SACCHARATE, AMPHETAMINE ASPARTATE MONOHYDRATE, DEXTROAMPHETAMINE SULFATE AND AMPHETAMINE SULFATE 5; 5; 5; 5 MG/1; MG/1; MG/1; MG/1
20 CAPSULE, EXTENDED RELEASE ORAL 2 TIMES DAILY
Qty: 60 CAPSULE | Refills: 0 | Status: SHIPPED | OUTPATIENT
Start: 2018-05-29 | End: 2018-05-29 | Stop reason: SDUPTHER

## 2018-05-29 RX ORDER — MONTELUKAST SODIUM 10 MG/1
10 TABLET ORAL
Qty: 90 TABLET | Refills: 1 | Status: SHIPPED | OUTPATIENT
Start: 2018-05-29 | End: 2018-11-15 | Stop reason: SDUPTHER

## 2018-05-29 RX ORDER — FLUTICASONE PROPIONATE 50 MCG
1 SPRAY, SUSPENSION (ML) NASAL DAILY
Qty: 16 G | Refills: 0 | Status: ON HOLD | OUTPATIENT
Start: 2018-05-29 | End: 2018-12-14

## 2018-05-29 NOTE — TELEPHONE ENCOUNTER
Called and amlodipine, 5 mg daily, she is to continue checking blood pressures at home and record this for diary    She is to check an a few weeks with her home blood pressure measurements

## 2018-05-29 NOTE — PROGRESS NOTES
Assessment/Plan:  Diarrhea: For diarrhea and abdominal symptoms, encourage patient to follow up with GI and get EGD and colonoscopy completed  Allergic rhinitis: That some of the symptoms patient is experiencing is due to allergic rhinitis because of when symptoms started  Since she states that she is unable to get the Zyrtec, will send over prescription for Singulair and Flonase to see if this will help with symptoms  If there is still concern with the hearing loss after symptoms have improved, will refer to ENT for further evaluation  Bipolar: Patient is still trying to find a new psychiatrist   Will refill Adderall and Seroquel at this time  Chronic Pain: Stable  Will continue with Percocet 7 5 mg every 6 hr   [place] drug database was reviewed  No concerns at this time  For concerns with trigger finger in left hand, and nodule in right hand will get x-rays  Pending results of x-rays may refer to Ortho for further evaluation  d Diagnoses and all orders for this visit:    Bilateral hand pain  -     XR hand 3+ vw right; Future  -     XR hand 3+ vw left; Future    Bipolar 1 disorder (HCC)  -     Discontinue: amphetamine-dextroamphetamine (ADDERALL XR) 20 MG 24 hr capsule; Take 1 capsule (20 mg total) by mouth 2 (two) times a day Max Daily Amount: 40 mg  -     amphetamine-dextroamphetamine (ADDERALL XR) 20 MG 24 hr capsule; Take 1 capsule (20 mg total) by mouth 2 (two) times a day Max Daily Amount: 40 mg    Wegener's granulomatosis ([address] )  -     Discontinue: oxyCODONE-acetaminophen (PERCOCET) 7 5-325 MG per tablet; Take 1 tablet by mouth every 6 (six) hours as needed (pain) Max Daily Amount: 4 tablets  -     oxyCODONE-acetaminophen (PERCOCET) 7 5-325 MG per tablet; Take 1 tablet by mouth every 6 (six) hours as needed (pain) Max Daily Amount: 4 tablets    Chronic pain syndrome  -     Discontinue: oxyCODONE-acetaminophen (PERCOCET) 7 5-325 MG per tablet;  Take 1 tablet by mouth every 6 (six) hours as needed (pain) Max Daily Amount: 4 tablets  -     oxyCODONE-acetaminophen (PERCOCET) 7 5-325 MG per tablet; Take 1 tablet by mouth every 6 (six) hours as needed (pain) Max Daily Amount: 4 tablets    Bloating  -     simethicone (MYLICON) 502 MG chewable tablet; Chew 1 tablet (125 mg total) every 6 (six) hours as needed for flatulence    Seasonal allergic rhinitis due to pollen  -     montelukast (SINGULAIR) 10 mg tablet; Take 1 tablet (10 mg total) by mouth daily at bedtime  -     fluticasone (FLONASE) 50 mcg/act nasal spray; 1 spray into each nostril daily          Subjective:      Patient ID: Agata Herrera is a 52 y o  female  Patient is a 52y/o female presenting with her "peer" c/o 5 weeks of decreased smell and hearing  She associates these symptoms with onset of seasonal allergies  She explains that she has suffered from allergies for about 3 years  She was prescribed Claritin but was unable to fill the prescription due to cost  She reports experiencing sinus pressure and tenderness and right ear drainage  She denies any nasal congestion, ear pain, sore throat, or eye discomfort  She denies any chest pain, tenderness, shortness of breath, or cough  She reports difficulty transferring food bolus to posterior pharynx  She explains that she can successfully swallow food bolus but has been having to spit out food frequently due to inability to successfully move the food  She also reports epigastric abdominal pain with nausea  She reports about 1 week of "slimy" diarrhea but denies any bloody stools  She was unable to go to her colonoscopy appt last week due to lack of caregiver support  She also reports feeling a "bump" just proximal to her right third digit that is painful when she is holding objects  She denies any trauma to the hand or any difficulty moving her digit  She also reports feeling that her left digit "locks up" and is painful  Hypertension   This is a chronic problem   Pertinent negatives include no chest pain or shortness of breath  Risk factors for coronary artery disease include sedentary lifestyle, diabetes mellitus and obesity  The following portions of the patient's history were reviewed and updated as appropriate: allergies, current medications, past family history, past medical history, past social history, past surgical history and problem list     Review of Systems   Constitutional: Positive for appetite change (decreased) and unexpected weight change (reports weight loss)  HENT: Positive for ear discharge (right, mostly at night), hearing loss and trouble swallowing (reports difficulty transfering food to posterior oropharynx)  Negative for congestion, facial swelling, mouth sores, rhinorrhea, sinus pain, sinus pressure, sneezing and sore throat  Eyes: Negative for pain, discharge and itching  Respiratory: Negative for cough, shortness of breath and wheezing  Cardiovascular: Negative for chest pain and leg swelling  Gastrointestinal: Positive for abdominal pain, diarrhea and nausea  Negative for abdominal distention, blood in stool, constipation and vomiting  Musculoskeletal: Positive for gait problem  Negative for joint swelling and myalgias  "Lump" on right hand just proximal to right third digit   Skin: Negative for color change, pallor and rash  Objective:      /80 (BP Location: Left arm, Patient Position: Sitting, Cuff Size: Large)   Pulse 84   Temp 97 5 °F (36 4 °C) (Tympanic)   Resp 18   Ht 5' 1" (1 549 m)   Wt 90 9 kg (200 lb 5 oz)   LMP  (LMP Unknown)   SpO2 95%   BMI 37 85 kg/m²          Physical Exam   Constitutional: She is oriented to person, place, and time  She appears well-developed  No distress  HENT:   Head: Normocephalic and atraumatic  Right Ear: External ear normal  No drainage, swelling or tenderness  Tympanic membrane is not injected  No middle ear effusion  Decreased hearing is noted     Left Ear: External ear normal  No drainage, swelling or tenderness  Tympanic membrane is not injected  No middle ear effusion  No decreased hearing is noted  Nose: Nose normal    Mouth/Throat: Oropharynx is clear and moist and mucous membranes are normal  No oropharyngeal exudate, posterior oropharyngeal edema or tonsillar abscesses  EACs mildly erythematous, non-edematous b/l   Eyes: Pupils are equal, round, and reactive to light  Right eye exhibits no discharge  Left eye exhibits no discharge  No scleral icterus  Neck: Normal range of motion  Neck supple  Cardiovascular: Normal rate, regular rhythm and normal heart sounds  Exam reveals no gallop and no friction rub  No murmur heard  Pulmonary/Chest: Effort normal and breath sounds normal  No respiratory distress  She has no rales  Abdominal: Soft  She exhibits no distension  There is tenderness (epigastric)  There is no rebound  Musculoskeletal: She exhibits no edema or tenderness  Neurological: She is alert and oriented to person, place, and time  Skin: Skin is warm  No rash noted  She is not diaphoretic  No erythema

## 2018-05-29 NOTE — ASSESSMENT & PLAN NOTE
For diarrhea and abdominal symptoms, encourage patient to follow up with GI and get EGD and colonoscopy completed

## 2018-05-29 NOTE — ASSESSMENT & PLAN NOTE
Patient is still trying to find a new psychiatrist   Will refill Adderall and Seroquel at this time

## 2018-05-29 NOTE — ASSESSMENT & PLAN NOTE
Stable  Will continue with Percocet 7 5 mg every 6 hr   [place] drug database was reviewed  No concerns at this time

## 2018-05-29 NOTE — ASSESSMENT & PLAN NOTE
That some of the symptoms patient is experiencing is due to allergic rhinitis because of when symptoms started  Since she states that she is unable to get the Zyrtec, will send over prescription for Singulair and Flonase to see if this will help with symptoms  If there is still concern with the hearing loss after symptoms have improved, will refer to ENT for further evaluation

## 2018-05-29 NOTE — TELEPHONE ENCOUNTER
Per pt she called pharmacy and they have her script for amlodipine and pt will call back in a couple of weeks with bp reading

## 2018-05-29 NOTE — TELEPHONE ENCOUNTER
FYI: per pt she is seeing her pcp and she stated that she is having her pcp handle her bp and she will communicate with us what ever her pcp wants her to do   Pt would like a call back at [phone] please advise

## 2018-06-01 DIAGNOSIS — R11.0 NAUSEA: ICD-10-CM

## 2018-06-04 RX ORDER — ONDANSETRON 4 MG/1
4 TABLET, FILM COATED ORAL EVERY 8 HOURS PRN
Qty: 30 TABLET | Refills: 0 | Status: SHIPPED | OUTPATIENT
Start: 2018-06-04 | End: 2018-09-17 | Stop reason: SDUPTHER

## 2018-06-05 DIAGNOSIS — R14.0 BLOATING: ICD-10-CM

## 2018-06-05 RX ORDER — SIMETHICONE 125 MG
125 TABLET,CHEWABLE ORAL EVERY 6 HOURS PRN
Qty: 40 TABLET | Refills: 0 | Status: SHIPPED | OUTPATIENT
Start: 2018-06-05 | End: 2019-05-21

## 2018-06-08 ENCOUNTER — TELEPHONE (OUTPATIENT)
Dept: FAMILY MEDICINE CLINIC | Facility: CLINIC | Age: 48
End: 2018-06-08

## 2018-06-08 NOTE — TELEPHONE ENCOUNTER
Pt is requesting for medication to be a higher dosage       She admitted of taking two tablets(100mg) last night and she felt better  Adame Spruce     She was crying and feeling scared and nervous so that's why she took more than she should have

## 2018-06-11 DIAGNOSIS — F31.9 BIPOLAR 1 DISORDER (HCC): ICD-10-CM

## 2018-06-11 RX ORDER — QUETIAPINE FUMARATE 100 MG/1
100 TABLET, FILM COATED ORAL
Qty: 30 TABLET | Refills: 2 | Status: SHIPPED | OUTPATIENT
Start: 2018-06-11 | End: 2018-09-12 | Stop reason: SDUPTHER

## 2018-06-12 ENCOUNTER — TELEPHONE (OUTPATIENT)
Dept: NEPHROLOGY | Facility: CLINIC | Age: 48
End: 2018-06-12

## 2018-06-12 NOTE — TELEPHONE ENCOUNTER
----- Message from Radha Davila DO sent at 6/11/2018 10:15 AM EDT -----  Discussed with patient, high readings as described below although patient also getting readings as low as 120/60  She is going to check a blood pressure diary for the next week, instructed her how to check her blood pressure at home  She is to call next week with a record   ----- Message -----  From: Jolanta Queen  Sent: 6/7/2018   1:13 PM  To: Radha Davila DO    Pt called stating that her BP is elevated 148/128 being on Amlodipine 5mg at night time  She stated that she hasn't been taking her BP everyday since she is "not good with remembering"  She mentioned that her BP was "excellent" when she first started the Amlodipine

## 2018-06-14 ENCOUNTER — TELEPHONE (OUTPATIENT)
Dept: FAMILY MEDICINE CLINIC | Facility: CLINIC | Age: 48
End: 2018-06-14

## 2018-06-15 DIAGNOSIS — G47.411 CATAPLEXY: ICD-10-CM

## 2018-06-15 DIAGNOSIS — R29.898 WEAKNESS OF BOTH LOWER EXTREMITIES: Primary | ICD-10-CM

## 2018-06-25 DIAGNOSIS — F31.9 BIPOLAR 1 DISORDER (HCC): ICD-10-CM

## 2018-06-25 DIAGNOSIS — G89.4 CHRONIC PAIN SYNDROME: ICD-10-CM

## 2018-06-25 DIAGNOSIS — R10.13 EPIGASTRIC PAIN: ICD-10-CM

## 2018-06-25 DIAGNOSIS — M31.30 WEGENER'S GRANULOMATOSIS: Chronic | ICD-10-CM

## 2018-06-26 RX ORDER — ONDANSETRON 4 MG/1
4 TABLET, ORALLY DISINTEGRATING ORAL EVERY 8 HOURS PRN
Qty: 30 TABLET | Refills: 2 | Status: SHIPPED | OUTPATIENT
Start: 2018-06-26 | End: 2018-07-24 | Stop reason: SDUPTHER

## 2018-06-26 RX ORDER — OXYCODONE AND ACETAMINOPHEN 7.5; 325 MG/1; MG/1
1 TABLET ORAL EVERY 6 HOURS PRN
Qty: 120 TABLET | Refills: 0 | Status: SHIPPED | OUTPATIENT
Start: 2018-06-26 | End: 2018-07-24 | Stop reason: SDUPTHER

## 2018-06-26 RX ORDER — DEXTROAMPHETAMINE SACCHARATE, AMPHETAMINE ASPARTATE MONOHYDRATE, DEXTROAMPHETAMINE SULFATE AND AMPHETAMINE SULFATE 5; 5; 5; 5 MG/1; MG/1; MG/1; MG/1
20 CAPSULE, EXTENDED RELEASE ORAL 2 TIMES DAILY
Qty: 60 CAPSULE | Refills: 0 | Status: SHIPPED | OUTPATIENT
Start: 2018-06-26 | End: 2018-07-24 | Stop reason: SDUPTHER

## 2018-06-28 ENCOUNTER — TELEPHONE (OUTPATIENT)
Dept: FAMILY MEDICINE CLINIC | Facility: CLINIC | Age: 48
End: 2018-06-28

## 2018-06-29 NOTE — TELEPHONE ENCOUNTER
Per Selvin CHAIREZ([phone]) they have no Alia Roys working there    I have faxed paperwork from [address] that RR filled out with height and weight today at 10:17am

## 2018-06-29 NOTE — TELEPHONE ENCOUNTER
Do not know who do exactly address this to  I did fill out some paperwork yesterday which may have been for a wheelchair, which did have to write down the height and weight for  As far as the type of wheelchair that is recommended, this is usually done at the wheelchair clinic and I just sign off on the paperwork

## 2018-07-02 DIAGNOSIS — N18.30 CHRONIC KIDNEY DISEASE, STAGE III (MODERATE) (HCC): ICD-10-CM

## 2018-07-03 ENCOUNTER — TELEPHONE (OUTPATIENT)
Dept: FAMILY MEDICINE CLINIC | Facility: CLINIC | Age: 48
End: 2018-07-03

## 2018-07-03 NOTE — TELEPHONE ENCOUNTER
Received a Fax from [employer] [facility] from [facility] date: 7/2/2018 stated: "The chart notes we received do not qualify at this time  The notes can be amended stating the patient's ambulatory status    The amended part of the note must be signed and dated "      Please advise-FAX in your bin (& last office notes)

## 2018-07-06 DIAGNOSIS — IMO0001 IDDM (INSULIN DEPENDENT DIABETES MELLITUS): Chronic | ICD-10-CM

## 2018-07-09 ENCOUNTER — TELEPHONE (OUTPATIENT)
Dept: FAMILY MEDICINE CLINIC | Facility: CLINIC | Age: 48
End: 2018-07-09

## 2018-07-11 ENCOUNTER — TELEPHONE (OUTPATIENT)
Dept: GASTROENTEROLOGY | Facility: MEDICAL CENTER | Age: 48
End: 2018-07-11

## 2018-07-11 DIAGNOSIS — R10.13 EPIGASTRIC PAIN: Primary | ICD-10-CM

## 2018-07-11 RX ORDER — SUCRALFATE ORAL 1 G/10ML
1 SUSPENSION ORAL 3 TIMES DAILY
Qty: 420 ML | Refills: 2 | Status: ON HOLD | OUTPATIENT
Start: 2018-07-11 | End: 2018-12-14

## 2018-07-11 NOTE — TELEPHONE ENCOUNTER
Dr Julien Craig pt  [facility] called stating pt needs a refill on her carafate 100mg

## 2018-07-13 ENCOUNTER — HOSPITAL ENCOUNTER (EMERGENCY)
Facility: HOSPITAL | Age: 48
Discharge: HOME | End: 2018-07-13
Admitting: EMERGENCY MEDICINE
Payer: COMMERCIAL

## 2018-07-13 VITALS
BODY MASS INDEX: 37.79 KG/M2 | OXYGEN SATURATION: 98 % | SYSTOLIC BLOOD PRESSURE: 118 MMHG | TEMPERATURE: 98.3 F | WEIGHT: 200 LBS | DIASTOLIC BLOOD PRESSURE: 65 MMHG | RESPIRATION RATE: 20 BRPM | HEART RATE: 82 BPM

## 2018-07-13 DIAGNOSIS — M31.30 WEGENER'S GRANULOMATOSIS: Chronic | ICD-10-CM

## 2018-07-13 DIAGNOSIS — M54.42 CHRONIC LOW BACK PAIN WITH BILATERAL SCIATICA, UNSPECIFIED BACK PAIN LATERALITY: ICD-10-CM

## 2018-07-13 DIAGNOSIS — IMO0001 IDDM (INSULIN DEPENDENT DIABETES MELLITUS): Chronic | ICD-10-CM

## 2018-07-13 DIAGNOSIS — G89.29 CHRONIC LOW BACK PAIN WITH BILATERAL SCIATICA, UNSPECIFIED BACK PAIN LATERALITY: ICD-10-CM

## 2018-07-13 DIAGNOSIS — Z91.19 NONCOMPLIANCE: ICD-10-CM

## 2018-07-13 DIAGNOSIS — R10.13 EPIGASTRIC PAIN: Primary | ICD-10-CM

## 2018-07-13 DIAGNOSIS — K85.90 PANCREATITIS: ICD-10-CM

## 2018-07-13 DIAGNOSIS — G89.29 CHRONIC PAIN: ICD-10-CM

## 2018-07-13 DIAGNOSIS — M54.41 CHRONIC LOW BACK PAIN WITH BILATERAL SCIATICA, UNSPECIFIED BACK PAIN LATERALITY: ICD-10-CM

## 2018-07-13 DIAGNOSIS — N18.30 CKD (CHRONIC KIDNEY DISEASE) STAGE 3, GFR 30-59 ML/MIN (HCC): Chronic | ICD-10-CM

## 2018-07-13 DIAGNOSIS — F31.9 BIPOLAR 1 DISORDER (HCC): ICD-10-CM

## 2018-07-13 LAB
ALBUMIN SERPL BCP-MCNC: 3.6 G/DL (ref 3.5–5)
ALP SERPL-CCNC: 132 U/L (ref 46–116)
ALT SERPL W P-5'-P-CCNC: 23 U/L (ref 12–78)
ANION GAP SERPL CALCULATED.3IONS-SCNC: 9 MMOL/L (ref 4–13)
AST SERPL W P-5'-P-CCNC: 17 U/L (ref 5–45)
BACTERIA UR QL AUTO: ABNORMAL /HPF
BASOPHILS # BLD AUTO: 0.01 THOUSANDS/ΜL (ref 0–0.1)
BASOPHILS NFR BLD AUTO: 0 % (ref 0–1)
BILIRUB SERPL-MCNC: 0.21 MG/DL (ref 0.2–1)
BILIRUB UR QL STRIP: NEGATIVE
BUN SERPL-MCNC: 36 MG/DL (ref 5–25)
CALCIUM SERPL-MCNC: 9.4 MG/DL (ref 8.3–10.1)
CHLORIDE SERPL-SCNC: 105 MMOL/L (ref 100–108)
CLARITY UR: CLEAR
CO2 SERPL-SCNC: 26 MMOL/L (ref 21–32)
COLOR UR: YELLOW
CREAT SERPL-MCNC: 2.38 MG/DL (ref 0.6–1.3)
EOSINOPHIL # BLD AUTO: 0.03 THOUSAND/ΜL (ref 0–0.61)
EOSINOPHIL NFR BLD AUTO: 1 % (ref 0–6)
ERYTHROCYTE [DISTWIDTH] IN BLOOD BY AUTOMATED COUNT: 14 % (ref 11.6–15.1)
EXT PREG TEST URINE: NEGATIVE
GFR SERPL CREATININE-BSD FRML MDRD: 24 ML/MIN/1.73SQ M
GLUCOSE SERPL-MCNC: 164 MG/DL (ref 65–140)
GLUCOSE UR STRIP-MCNC: NEGATIVE MG/DL
HCT VFR BLD AUTO: 38.3 % (ref 34.8–46.1)
HGB BLD-MCNC: 12.7 G/DL (ref 11.5–15.4)
HGB UR QL STRIP.AUTO: ABNORMAL
KETONES UR STRIP-MCNC: NEGATIVE MG/DL
LEUKOCYTE ESTERASE UR QL STRIP: NEGATIVE
LIPASE SERPL-CCNC: 818 U/L (ref 73–393)
LYMPHOCYTES # BLD AUTO: 1.9 THOUSANDS/ΜL (ref 0.6–4.47)
LYMPHOCYTES NFR BLD AUTO: 30 % (ref 14–44)
MCH RBC QN AUTO: 30.9 PG (ref 26.8–34.3)
MCHC RBC AUTO-ENTMCNC: 33.2 G/DL (ref 31.4–37.4)
MCV RBC AUTO: 93 FL (ref 82–98)
MONOCYTES # BLD AUTO: 0.48 THOUSAND/ΜL (ref 0.17–1.22)
MONOCYTES NFR BLD AUTO: 8 % (ref 4–12)
NEUTROPHILS # BLD AUTO: 3.91 THOUSANDS/ΜL (ref 1.85–7.62)
NEUTS SEG NFR BLD AUTO: 62 % (ref 43–75)
NITRITE UR QL STRIP: NEGATIVE
NON-SQ EPI CELLS URNS QL MICRO: ABNORMAL /HPF
NRBC BLD AUTO-RTO: 0 /100 WBCS
PH UR STRIP.AUTO: 5.5 [PH] (ref 4.5–8)
PLATELET # BLD AUTO: 291 THOUSANDS/UL (ref 149–390)
PMV BLD AUTO: 10.5 FL (ref 8.9–12.7)
POTASSIUM SERPL-SCNC: 4 MMOL/L (ref 3.5–5.3)
PROT SERPL-MCNC: 7.8 G/DL (ref 6.4–8.2)
PROT UR STRIP-MCNC: >=300 MG/DL
RBC # BLD AUTO: 4.11 MILLION/UL (ref 3.81–5.12)
RBC #/AREA URNS AUTO: ABNORMAL /HPF
SODIUM SERPL-SCNC: 140 MMOL/L (ref 136–145)
SP GR UR STRIP.AUTO: >=1.03 (ref 1–1.03)
UROBILINOGEN UR QL STRIP.AUTO: 0.2 E.U./DL
WBC # BLD AUTO: 6.33 THOUSAND/UL (ref 4.31–10.16)
WBC #/AREA URNS AUTO: ABNORMAL /HPF

## 2018-07-13 PROCEDURE — 96374 THER/PROPH/DIAG INJ IV PUSH: CPT

## 2018-07-13 PROCEDURE — 99284 EMERGENCY DEPT VISIT MOD MDM: CPT

## 2018-07-13 PROCEDURE — 36415 COLL VENOUS BLD VENIPUNCTURE: CPT

## 2018-07-13 PROCEDURE — 81001 URINALYSIS AUTO W/SCOPE: CPT

## 2018-07-13 PROCEDURE — 85025 COMPLETE CBC W/AUTO DIFF WBC: CPT

## 2018-07-13 PROCEDURE — 83690 ASSAY OF LIPASE: CPT

## 2018-07-13 PROCEDURE — 80053 COMPREHEN METABOLIC PANEL: CPT

## 2018-07-13 PROCEDURE — 81025 URINE PREGNANCY TEST: CPT

## 2018-07-13 RX ORDER — ONDANSETRON 2 MG/ML
4 INJECTION INTRAMUSCULAR; INTRAVENOUS ONCE
Status: COMPLETED | OUTPATIENT
Start: 2018-07-13 | End: 2018-07-13

## 2018-07-13 RX ORDER — FAMOTIDINE 40 MG/5ML
20 POWDER, FOR SUSPENSION ORAL ONCE
Status: COMPLETED | OUTPATIENT
Start: 2018-07-13 | End: 2018-07-13

## 2018-07-13 RX ORDER — ONDANSETRON 4 MG/1
4 TABLET, ORALLY DISINTEGRATING ORAL ONCE
Status: DISCONTINUED | OUTPATIENT
Start: 2018-07-13 | End: 2018-07-13

## 2018-07-13 RX ORDER — MAGNESIUM HYDROXIDE/ALUMINUM HYDROXICE/SIMETHICONE 120; 1200; 1200 MG/30ML; MG/30ML; MG/30ML
30 SUSPENSION ORAL ONCE
Status: COMPLETED | OUTPATIENT
Start: 2018-07-13 | End: 2018-07-13

## 2018-07-13 RX ORDER — FAMOTIDINE 20 MG/1
20 TABLET, FILM COATED ORAL 2 TIMES DAILY
Qty: 28 TABLET | Refills: 0 | Status: SHIPPED | OUTPATIENT
Start: 2018-07-13 | End: 2018-07-13

## 2018-07-13 RX ORDER — OXYCODONE HYDROCHLORIDE AND ACETAMINOPHEN 5; 325 MG/1; MG/1
1 TABLET ORAL ONCE
Status: COMPLETED | OUTPATIENT
Start: 2018-07-13 | End: 2018-07-13

## 2018-07-13 RX ORDER — SUCRALFATE ORAL 1 G/10ML
1000 SUSPENSION ORAL EVERY 6 HOURS SCHEDULED
Status: DISCONTINUED | OUTPATIENT
Start: 2018-07-14 | End: 2018-07-14 | Stop reason: HOSPADM

## 2018-07-13 RX ORDER — FAMOTIDINE 20 MG/1
20 TABLET, FILM COATED ORAL 2 TIMES DAILY
Qty: 28 TABLET | Refills: 0 | Status: SHIPPED | OUTPATIENT
Start: 2018-07-13 | End: 2018-07-26 | Stop reason: SDUPTHER

## 2018-07-13 RX ADMIN — SUCRALFATE 1000 MG: 1 SUSPENSION ORAL at 23:18

## 2018-07-13 RX ADMIN — FAMOTIDINE 20 MG: 40 POWDER, FOR SUSPENSION ORAL at 21:51

## 2018-07-13 RX ADMIN — OXYCODONE HYDROCHLORIDE AND ACETAMINOPHEN 1 TABLET: 5; 325 TABLET ORAL at 23:18

## 2018-07-13 RX ADMIN — LIDOCAINE HYDROCHLORIDE 15 ML: 20 SOLUTION ORAL; TOPICAL at 21:34

## 2018-07-13 RX ADMIN — ONDANSETRON 4 MG: 2 INJECTION INTRAMUSCULAR; INTRAVENOUS at 21:34

## 2018-07-13 RX ADMIN — ALUMINUM HYDROXIDE, MAGNESIUM HYDROXIDE, AND SIMETHICONE 30 ML: 200; 200; 20 SUSPENSION ORAL at 21:33

## 2018-07-14 ENCOUNTER — HOSPITAL ENCOUNTER (EMERGENCY)
Facility: HOSPITAL | Age: 48
Discharge: HOME | End: 2018-07-15
Attending: EMERGENCY MEDICINE | Admitting: EMERGENCY MEDICINE
Payer: COMMERCIAL

## 2018-07-14 ENCOUNTER — APPOINTMENT (EMERGENCY)
Dept: CT IMAGING | Facility: HOSPITAL | Age: 48
End: 2018-07-14
Payer: COMMERCIAL

## 2018-07-14 VITALS
DIASTOLIC BLOOD PRESSURE: 101 MMHG | RESPIRATION RATE: 16 BRPM | SYSTOLIC BLOOD PRESSURE: 187 MMHG | TEMPERATURE: 97.3 F | OXYGEN SATURATION: 98 % | HEART RATE: 78 BPM

## 2018-07-14 DIAGNOSIS — R11.2 NAUSEA & VOMITING: ICD-10-CM

## 2018-07-14 DIAGNOSIS — R10.9 ACUTE ABDOMINAL PAIN: Primary | ICD-10-CM

## 2018-07-14 DIAGNOSIS — N18.9 CKD (CHRONIC KIDNEY DISEASE): ICD-10-CM

## 2018-07-14 DIAGNOSIS — E86.0 DEHYDRATION: ICD-10-CM

## 2018-07-14 DIAGNOSIS — I10 HTN (HYPERTENSION): ICD-10-CM

## 2018-07-14 LAB
ALBUMIN SERPL BCP-MCNC: 3.8 G/DL (ref 3.5–5)
ALP SERPL-CCNC: 130 U/L (ref 46–116)
ALT SERPL W P-5'-P-CCNC: 26 U/L (ref 12–78)
ANION GAP SERPL CALCULATED.3IONS-SCNC: 11 MMOL/L (ref 4–13)
AST SERPL W P-5'-P-CCNC: 22 U/L (ref 5–45)
BACTERIA UR QL AUTO: ABNORMAL /HPF
BASOPHILS # BLD AUTO: 0.02 THOUSANDS/ΜL (ref 0–0.1)
BASOPHILS NFR BLD AUTO: 0 % (ref 0–1)
BILIRUB SERPL-MCNC: 0.25 MG/DL (ref 0.2–1)
BILIRUB UR QL STRIP: NEGATIVE
BUN SERPL-MCNC: 33 MG/DL (ref 5–25)
CALCIUM SERPL-MCNC: 9.3 MG/DL (ref 8.3–10.1)
CHLORIDE SERPL-SCNC: 104 MMOL/L (ref 100–108)
CLARITY UR: CLEAR
CO2 SERPL-SCNC: 27 MMOL/L (ref 21–32)
COLOR UR: YELLOW
CREAT SERPL-MCNC: 2.27 MG/DL (ref 0.6–1.3)
EOSINOPHIL # BLD AUTO: 0.07 THOUSAND/ΜL (ref 0–0.61)
EOSINOPHIL NFR BLD AUTO: 1 % (ref 0–6)
ERYTHROCYTE [DISTWIDTH] IN BLOOD BY AUTOMATED COUNT: 14.2 % (ref 11.6–15.1)
GFR SERPL CREATININE-BSD FRML MDRD: 25 ML/MIN/1.73SQ M
GLUCOSE SERPL-MCNC: 176 MG/DL (ref 65–140)
GLUCOSE UR STRIP-MCNC: NEGATIVE MG/DL
HCT VFR BLD AUTO: 42.1 % (ref 34.8–46.1)
HGB BLD-MCNC: 14.2 G/DL (ref 11.5–15.4)
HGB UR QL STRIP.AUTO: ABNORMAL
KETONES UR STRIP-MCNC: NEGATIVE MG/DL
LEUKOCYTE ESTERASE UR QL STRIP: NEGATIVE
LIPASE SERPL-CCNC: 339 U/L (ref 73–393)
LYMPHOCYTES # BLD AUTO: 2.55 THOUSANDS/ΜL (ref 0.6–4.47)
LYMPHOCYTES NFR BLD AUTO: 24 % (ref 14–44)
MCH RBC QN AUTO: 30.9 PG (ref 26.8–34.3)
MCHC RBC AUTO-ENTMCNC: 33.7 G/DL (ref 31.4–37.4)
MCV RBC AUTO: 92 FL (ref 82–98)
MONOCYTES # BLD AUTO: 0.68 THOUSAND/ΜL (ref 0.17–1.22)
MONOCYTES NFR BLD AUTO: 7 % (ref 4–12)
NEUTROPHILS # BLD AUTO: 7.18 THOUSANDS/ΜL (ref 1.85–7.62)
NEUTS SEG NFR BLD AUTO: 68 % (ref 43–75)
NITRITE UR QL STRIP: NEGATIVE
NON-SQ EPI CELLS URNS QL MICRO: ABNORMAL /HPF
PH UR STRIP.AUTO: 6.5 [PH] (ref 4.5–8)
PLATELET # BLD AUTO: 308 THOUSANDS/UL (ref 149–390)
PMV BLD AUTO: 10.1 FL (ref 8.9–12.7)
POTASSIUM SERPL-SCNC: 3.9 MMOL/L (ref 3.5–5.3)
PROT SERPL-MCNC: 8.1 G/DL (ref 6.4–8.2)
PROT UR STRIP-MCNC: >=300 MG/DL
RBC # BLD AUTO: 4.6 MILLION/UL (ref 3.81–5.12)
RBC #/AREA URNS AUTO: ABNORMAL /HPF
SODIUM SERPL-SCNC: 142 MMOL/L (ref 136–145)
SP GR UR STRIP.AUTO: 1.02 (ref 1–1.03)
UROBILINOGEN UR QL STRIP.AUTO: 0.2 E.U./DL
WBC # BLD AUTO: 10.5 THOUSAND/UL (ref 4.31–10.16)
WBC #/AREA URNS AUTO: ABNORMAL /HPF

## 2018-07-14 PROCEDURE — 85025 COMPLETE CBC W/AUTO DIFF WBC: CPT | Performed by: EMERGENCY MEDICINE

## 2018-07-14 PROCEDURE — 80053 COMPREHEN METABOLIC PANEL: CPT | Performed by: EMERGENCY MEDICINE

## 2018-07-14 PROCEDURE — 96374 THER/PROPH/DIAG INJ IV PUSH: CPT

## 2018-07-14 PROCEDURE — 74176 CT ABD & PELVIS W/O CONTRAST: CPT

## 2018-07-14 PROCEDURE — 83690 ASSAY OF LIPASE: CPT | Performed by: EMERGENCY MEDICINE

## 2018-07-14 PROCEDURE — 36415 COLL VENOUS BLD VENIPUNCTURE: CPT

## 2018-07-14 PROCEDURE — 96375 TX/PRO/DX INJ NEW DRUG ADDON: CPT

## 2018-07-14 PROCEDURE — 81001 URINALYSIS AUTO W/SCOPE: CPT

## 2018-07-14 RX ORDER — ONDANSETRON 2 MG/ML
INJECTION INTRAMUSCULAR; INTRAVENOUS
Status: DISCONTINUED
Start: 2018-07-14 | End: 2018-07-15 | Stop reason: HOSPADM

## 2018-07-14 RX ORDER — ONDANSETRON 2 MG/ML
4 INJECTION INTRAMUSCULAR; INTRAVENOUS ONCE
Status: COMPLETED | OUTPATIENT
Start: 2018-07-14 | End: 2018-07-14

## 2018-07-14 RX ORDER — METOCLOPRAMIDE 5 MG/1
5 TABLET ORAL EVERY 6 HOURS
Qty: 12 TABLET | Refills: 0 | Status: SHIPPED | OUTPATIENT
Start: 2018-07-14 | End: 2018-07-17

## 2018-07-14 RX ORDER — METOCLOPRAMIDE HYDROCHLORIDE 5 MG/ML
10 INJECTION INTRAMUSCULAR; INTRAVENOUS ONCE
Status: COMPLETED | OUTPATIENT
Start: 2018-07-14 | End: 2018-07-14

## 2018-07-14 RX ORDER — MORPHINE SULFATE 2 MG/ML
2 INJECTION, SOLUTION INTRAMUSCULAR; INTRAVENOUS ONCE
Status: COMPLETED | OUTPATIENT
Start: 2018-07-14 | End: 2018-07-14

## 2018-07-14 RX ADMIN — METOCLOPRAMIDE 10 MG: 5 INJECTION, SOLUTION INTRAMUSCULAR; INTRAVENOUS at 21:35

## 2018-07-14 RX ADMIN — ONDANSETRON 4 MG: 2 INJECTION INTRAMUSCULAR; INTRAVENOUS at 20:50

## 2018-07-14 RX ADMIN — MORPHINE SULFATE 2 MG: 2 INJECTION, SOLUTION INTRAMUSCULAR; INTRAVENOUS at 21:36

## 2018-07-14 NOTE — ED PROVIDER NOTES
History  Chief Complaint   Patient presents with    Abdominal Pain     Patient reports epigastric abdominal pain which began a week ago  Reports pain worsens with eating  Hx of pancreatitis  Also reports n/v         Epigastric Pain   Pain location:  Epigastric  Pain quality: aching, burning and dull    Pain radiates to:  Does not radiate  Pain radiates to the back: no    Pain severity:  Moderate  Onset quality:  Gradual  Duration:  1 week  Timing:  Intermittent  Progression:  Worsening  Chronicity:  Chronic  Context: eating    Context: not breathing, not lifting, no movement and not raising an arm    Relieved by:  Nothing  Worsened by:  Nothing tried  Ineffective treatments:  None tried  Associated symptoms: abdominal pain, heartburn and nausea    Associated symptoms: no back pain, no cough, no dizziness, no dysphagia, no fatigue, no fever, no headache, no near-syncope, no numbness, no orthopnea, no palpitations, no PND, no shortness of breath, not vomiting and no weakness    Risk factors: diabetes mellitus, hypertension and obesity    Risk factors: not male        Prior to Admission Medications   Prescriptions Last Dose Informant Patient Reported? Taking?    Elastic Bandages & Supports (THUMB SPLINT/LEFT MEDIUM) MISC   No No   Sig: by Does not apply route daily   Humidifiers (COOL MIST HUMIDIFIER 1 GALLON) MISC  Self No No   Sig: by Does not apply route as needed (shortness of breath)   Insulin Pen Needle (PEN NEEDLES) 31G X 8 MM MISC   No No   Sig: Inject 1 Stick under the skin 4 (four) times a day (with meals and at bedtime)   Lancets (FREESTYLE) lancets   No No   Sig: by Other route 3 (three) times a day   QUEtiapine (SEROquel) 100 mg tablet   No No   Sig: Take 1 tablet (100 mg total) by mouth daily at bedtime   TiZANidine (ZANAFLEX) 4 MG capsule   No No   Sig: Take 1 capsule (4 mg total) by mouth 3 (three) times a day   amLODIPine (NORVASC) 5 mg tablet   No No   Sig: Take 1 tablet (5 mg total) by mouth daily amphetamine-dextroamphetamine (ADDERALL XR) 20 MG 24 hr capsule   No No   Sig: Take 1 capsule (20 mg total) by mouth 2 (two) times a day Max Daily Amount: 40 mg   cetirizine (ZyrTEC) 10 mg tablet   No No   Sig: Take 1 tablet (10 mg total) by mouth daily   cycloSPORINE (RESTASIS) 0 05 % ophthalmic emulsion  Self Yes No   Sig: Administer 1 drop to both eyes 2 (two) times a day   fluticasone (FLONASE) 50 mcg/act nasal spray   No No   Si spray into each nostril daily   gabapentin (NEURONTIN) 300 mg capsule   No No   Sig: Take 1 capsule (300 mg total) by mouth 3 (three) times a day   glucose blood (FREESTYLE LITE) test strip  Self No No   Si each by Other route 3 (three) times a day   insulin aspart (NovoLOG) 100 Units/mL injection pen   No No   Sig: Inject 6 Units under the skin 3 (three) times a day with meals   insulin glargine (LANTUS SOLOSTAR) injection pen 100 units/mL  Self No No   Sig: Inject 0 22 mL (22 Units total) under the skin daily at bedtime   lidocaine (XYLOCAINE) 5 % ointment   No No   Sig: Apply topically 2 (two) times a day as needed for mild pain Please apply 5g twice daily as needed   metoprolol succinate (TOPROL-XL) 25 mg 24 hr tablet  Self Yes No   Sig: Take 1 tablet by mouth daily   montelukast (SINGULAIR) 10 mg tablet   No No   Sig: Take 1 tablet (10 mg total) by mouth daily at bedtime   ondansetron (ZOFRAN) 4 mg tablet   No No   Sig: Take 1 tablet (4 mg total) by mouth every 8 (eight) hours as needed for nausea or vomiting   ondansetron (ZOFRAN-ODT) 4 mg disintegrating tablet   No No   Sig: Take 1 tablet (4 mg total) by mouth every 8 (eight) hours as needed for nausea or vomiting   oxyCODONE-acetaminophen (PERCOCET) 7 5-325 MG per tablet   No No   Sig: Take 1 tablet by mouth every 6 (six) hours as needed (pain) Max Daily Amount: 4 tablets   polyethylene glycol (GOLYTELY) 4000 mL solution   No No   Sig: Take 4,000 mL by mouth once for 1 dose   simethicone (MYLICON) 488 MG chewable tablet   No No   Sig: Chew 1 tablet (125 mg total) every 6 (six) hours as needed for flatulence   sucralfate (CARAFATE) 1 g/10 mL suspension   No No   Sig: Take 10 mL (1 g total) by mouth 3 (three) times a day      Facility-Administered Medications: None       Past Medical History:   Diagnosis Date    Anemia of chronic disease     Anxiety     Asthma     Asthma     Chronic abdominal pain     CKD (chronic kidney disease) stage 3, GFR 30-59 ml/min     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[place] [address] )     Cushing syndrome ([place] [address] )     Diabetes mellitus ([place] [address] )     DVT (deep venous thrombosis) ([place] [address] )     History of acute pancreatitis     felt secondary to Bactrim    HTN (hypertension)     Hyperlipidemia     Hypertension     Microscopic polyangiitis ([place] [address] )     Morbid obesity ([place] [address] )     MPA (microscopic polyangiitis) (HCC)     Ovarian cyst     Renal disorder     Self-inflicted injury     self inflicted skin wounds    Wegener's granulomatosis with renal involvement ([place] [address] ) 2015       Past Surgical History:   Procedure Laterality Date    ESOPHAGOGASTRODUODENOSCOPY  09/11/2015    mild antral gastritis    RELEASE SCAR CONTRACTURE / GRAFT REPAIRS OF HAND         Family History   Problem Relation Age of Onset    Colon cancer Neg Hx     Drug abuse Neg Hx         mother father    Mental illness Neg Hx         disorder, mother father     I have reviewed and agree with the history as documented  Social History   Substance Use Topics    Smoking status: Former Smoker     Types: Cigarettes     Quit date: 2010    Smokeless tobacco: Never Used    Alcohol use No        Review of Systems   Constitutional: Negative for fatigue and fever  HENT: Negative for trouble swallowing  Eyes: Negative for pain  Respiratory: Negative for cough and shortness of breath  Cardiovascular: Negative for palpitations, orthopnea, PND and near-syncope  Gastrointestinal: Positive for abdominal pain, heartburn and nausea  Negative for vomiting     Endocrine: Negative for polydipsia  Genitourinary: Negative for dysuria, flank pain and pelvic pain  Musculoskeletal: Negative for back pain  Skin: Negative for pallor and rash  Allergic/Immunologic: Negative for food allergies  Neurological: Negative for dizziness, weakness, numbness and headaches  Hematological: Does not bruise/bleed easily  Psychiatric/Behavioral: Positive for behavioral problems  Negative for agitation, confusion, decreased concentration and dysphoric mood  Physical Exam  Physical Exam   Constitutional: She appears well-developed and well-nourished  No distress  HENT:   Head: Normocephalic and atraumatic  Right Ear: External ear normal    Left Ear: External ear normal    Eyes: Conjunctivae are normal    Neck: Normal range of motion  Neck supple  No JVD present  Cardiovascular: Normal rate and regular rhythm  Pulmonary/Chest: Effort normal and breath sounds normal  No respiratory distress  Abdominal: Soft  She exhibits no distension  There is tenderness  Obese abdomen  Lymphadenopathy:     She has no cervical adenopathy  Skin: She is not diaphoretic         Vital Signs  ED Triage Vitals [07/13/18 2015]   Temperature Pulse Respirations Blood Pressure SpO2   98 3 °F (36 8 °C) 95 18 143/61 96 %      Temp Source Heart Rate Source Patient Position - Orthostatic VS BP Location FiO2 (%)   Temporal Monitor Sitting Right arm --      Pain Score       8           Vitals:    07/13/18 2015 07/13/18 2045 07/13/18 2329   BP: 143/61 126/58 118/65   Pulse: 95 86 82   Patient Position - Orthostatic VS: Sitting Lying Sitting       Visual Acuity      ED Medications  Medications   lidocaine viscous (XYLOCAINE) 2 % mucosal solution 15 mL (15 mL Swish & Spit Given 7/13/18 2134)   aluminum-magnesium hydroxide-simethicone (MYLANTA) 200-200-20 mg/5 mL oral suspension 30 mL (30 mL Oral Given 7/13/18 2133)   famotidine (PEPCID) oral suspension 20 mg (20 mg Oral Given 7/13/18 2151)   ondansetron [facility]) injection 4 mg (4 mg Intravenous Given 7/13/18 2134)   oxyCODONE-acetaminophen (PERCOCET) 5-325 mg per tablet 1 tablet (1 tablet Oral Given 7/13/18 2318)       Diagnostic Studies  Results Reviewed     Procedure Component Value Units Date/Time    Comprehensive metabolic panel [[id number]]  (Abnormal) Collected:  07/13/18 2027    Lab Status:  Final result Specimen:  Blood from Arm, Right Updated:  07/13/18 2106     Sodium 140 mmol/L      Potassium 4 0 mmol/L      Chloride 105 mmol/L      CO2 26 mmol/L      Anion Gap 9 mmol/L      BUN 36 (H) mg/dL      Creatinine 2 38 (H) mg/dL      Glucose 164 (H) mg/dL      Calcium 9 4 mg/dL      AST 17 U/L      ALT 23 U/L      Alkaline Phosphatase 132 (H) U/L      Total Protein 7 8 g/dL      Albumin 3 6 g/dL      Total Bilirubin 0 21 mg/dL      eGFR 24 ml/min/1 73sq m     Narrative:         National Kidney Disease Education Program recommendations are as follows:  GFR calculation is accurate only with a steady state creatinine  Chronic Kidney disease less than 60 ml/min/1 73 sq  meters  Kidney failure less than 15 ml/min/1 73 sq  meters      Lipase [[id number]]  (Abnormal) Collected:  07/13/18 2027    Lab Status:  Final result Specimen:  Blood from Arm, Right Updated:  07/13/18 2106     Lipase 818 (H) u/L     POCT pregnancy, urine [[id number]]  (Normal) Resulted:  07/13/18 2053    Lab Status:  Final result Updated:  07/13/18 2053     EXT PREG TEST UR (Ref: Negative) negative    Urine Microscopic [[id number]]  (Abnormal) Collected:  07/13/18 2032    Lab Status:  Final result Specimen:  Urine from Urine, Clean Catch Updated:  07/13/18 2051     RBC, UA None Seen /hpf      WBC, UA 4-10 (A) /hpf      Epithelial Cells Occasional /hpf      Bacteria, UA Occasional /hpf     CBC and differential [[id number]] Collected:  07/13/18 2027    Lab Status:  Final result Specimen:  Blood from Arm, Right Updated:  07/13/18 2046     WBC 6 33 Thousand/uL      RBC 4 11 Million/uL      Hemoglobin 12 7 g/dL Hematocrit 38 3 %      MCV 93 fL      MCH 30 9 pg      MCHC 33 2 g/dL      RDW 14 0 %      MPV 10 5 fL      Platelets 667 Thousands/uL      nRBC 0 /100 WBCs      Neutrophils Relative 62 %      Lymphocytes Relative 30 %      Monocytes Relative 8 %      Eosinophils Relative 1 %      Basophils Relative 0 %      Neutrophils Absolute 3 91 Thousands/µL      Lymphocytes Absolute 1 90 Thousands/µL      Monocytes Absolute 0 48 Thousand/µL      Eosinophils Absolute 0 03 Thousand/µL      Basophils Absolute 0 01 Thousands/µL     POCT urinalysis dipstick [[id number]]  (Abnormal) Resulted:  07/13/18 2028    Lab Status:  Final result Specimen:  Urine Updated:  07/13/18 2028    ED Urine Macroscopic [[id number]]  (Abnormal) Collected:  07/13/18 2032    Lab Status:  Final result Specimen:  Urine Updated:  07/13/18 2027     Color, UA Yellow     Clarity, UA Clear     pH, UA 5 5     Leukocytes, UA Negative     Nitrite, UA Negative     Protein, UA >=300 (A) mg/dl      Glucose, UA Negative mg/dl      Ketones, UA Negative mg/dl      Urobilinogen, UA 0 2 E U /dl      Bilirubin, UA Negative     Blood, UA Trace (A)     Specific Gravity, UA >=1 030    Narrative:       CLINITEK RESULT                 No orders to display              Procedures  Procedures       Phone Contacts  ED Phone Contact    ED Course                               MDM  Number of Diagnoses or Management Options  Bipolar 1 disorder [facility]):   Chronic pain:   CKD (chronic kidney disease) stage 3, GFR 30-59 ml/min:   Epigastric pain:   IDDM (insulin dependent diabetes mellitus) ([facility] ):   Noncompliance:   Pancreatitis:   Wegener's granulomatosis ([facility] ):   Diagnosis management comments: 49-year-old female with multiple comorbid conditions presents emergency department for epigastric pain which has been present for approximately one week  Patient states that there is no worsening of her condition but persistence of her condition   Patient admits that she is noncompliant with many of her medications and recently had called a GI specialist for Carafate in which she had been out of  Patient states that she has not seen the GI specialist as she has been referred to  Patient was to have an endoscopy but has missed that appointment  Patient has history of kidney disease with some mild elevation of her creatinine this day  Patient also has some mild elevation of her lipase with known elevations in lipase  Patient states that she was having difficulty digesting cheeseburger as well as ham sandwich  Patient admits to poor dietary choices  At this time patient has not been on a trial of Pepcid and will provide medications for patient to try  Labs were explained in detail to patient  Patient does admit to significant improvement of her overall discomfort while in the department  Patient is agreeable for discharge to home with close follow-up with either primary care and/or to return to the emergency department with worsening symptoms  Patient is highly encouraged to follow up with GI as she most likely does need an endoscopy  Patient is also educated on persistent or worsening signs symptoms and to follow up with primary care GI and/or return to the emergency department  Patient admits to understanding and agreement  Patient admits to improvement upon discharge  Did not feel that CT of the abdomen would benefit in diagnosis or management    Patient has had multiple CTs of the abdomen within the last year and no evidence of pancreatitis on those exams and doubt there would be on this exam        Amount and/or Complexity of Data Reviewed  Clinical lab tests: ordered and reviewed      CritCare Time    Disposition  Final diagnoses:   Epigastric pain   Pancreatitis   IDDM (insulin dependent diabetes mellitus) (HCC)   Wegener's granulomatosis (HCC)   CKD (chronic kidney disease) stage 3, GFR 30-59 ml/min   Noncompliance   Chronic pain   Bipolar 1 disorder ([facility] )     Time reflects when diagnosis was documented in both MDM as applicable and the Disposition within this note     Time User Action Codes Description Comment    7/13/2018 11:21 PM [place] M[place] Add [R10 13] Epigastric pain     7/13/2018 11:21 PM [place] M[place] Add [K85 90] Pancreatitis     7/13/2018 11:21 PM NickJose Add [E11 9,  Z79 4] IDDM (insulin dependent diabetes mellitus) ([facility] )     7/13/2018 11:21 PM [place] M[place] Add [M31 30] Wegener's granulomatosis ([facility] )     7/13/2018 11:21 PM NickJose Add [N18 3] CKD (chronic kidney disease) stage 3, GFR 30-59 ml/min     7/13/2018 11:21 PM [place] Add [Z91 19] Noncompliance     7/13/2018 11:22 PM [place] Add [G89 29] Chronic pain     7/13/2018 11:22 PM [place] M[place] Add [F31 9] Bipolar 1 disorder [facility])       ED Disposition     ED Disposition Condition Comment    Discharge  Nancy Wood discharge to home/self care      Condition at discharge: Stable        Follow-up Information     Follow up With Specialties Details Why [address] Emergency Department Emergency Medicine  If symptoms worsen [address] [facility] ED, [address] , [address], [facility],  [address]  [address] [place] [address]  [address] [place] [address]       Richardson Chu MD Gastroenterology   [address]  [phone]             Discharge Medication List as of 7/13/2018 11:27 PM      START taking these medications    Details   famotidine (PEPCID) 20 mg tablet Take 1 tablet (20 mg total) by mouth 2 (two) times a day, Starting Fri 7/13/2018, Print         CONTINUE these medications which have NOT CHANGED    Details   amLODIPine (NORVASC) 5 mg tablet Take 1 tablet (5 mg total) by mouth daily, Starting Tue 5/29/2018, Normal      amphetamine-dextroamphetamine (ADDERALL XR) 20 MG 24 hr capsule Take 1 capsule (20 mg total) by mouth 2 (two) times a day Max Daily Amount: 40 mg, Starting Tue 6/26/2018, Normal      cetirizine (ZyrTEC) 10 mg tablet Take 1 tablet (10 mg total) by mouth daily, Starting Thu 4/26/2018, Normal      cycloSPORINE (RESTASIS) 0 05 % ophthalmic emulsion Administer 1 drop to both eyes 2 (two) times a day, Historical Med      Elastic Bandages & Supports (THUMB SPLINT/LEFT MEDIUM) MISC by Does not apply route daily, Starting Thu 4/26/2018, Print      fluticasone (FLONASE) 50 mcg/act nasal spray 1 spray into each nostril daily, Starting Tue 5/29/2018, Normal      gabapentin (NEURONTIN) 300 mg capsule Take 1 capsule (300 mg total) by mouth 3 (three) times a day, Starting Fri 4/13/2018, Normal      glucose blood (FREESTYLE LITE) test strip 1 each by Other route 3 (three) times a day, Starting Thu 2/1/2018, Normal      Humidifiers (COOL MIST HUMIDIFIER 1 GALLON) MISC by Does not apply route as needed (shortness of breath), Starting Tue 2/27/2018, Print      insulin aspart (NovoLOG) 100 Units/mL injection pen Inject 6 Units under the skin 3 (three) times a day with meals, Starting Mon 7/9/2018, Normal      insulin glargine (LANTUS SOLOSTAR) injection pen 100 units/mL Inject 0 22 mL (22 Units total) under the skin daily at bedtime, Starting Tue 3/13/2018, Normal      Insulin Pen Needle (PEN NEEDLES) 31G X 8 MM MISC Inject 1 Stick under the skin 4 (four) times a day (with meals and at bedtime), Starting Wed 3/28/2018, Normal      Lancets (FREESTYLE) lancets by Other route 3 (three) times a day, Starting Thu 4/5/2018, Normal      lidocaine (XYLOCAINE) 5 % ointment Apply topically 2 (two) times a day as needed for mild pain Please apply 5g twice daily as needed, Starting Wed 3/28/2018, Normal      metoprolol succinate (TOPROL-XL) 25 mg 24 hr tablet Take 1 tablet by mouth daily, Starting Wed 11/4/2015, Historical Med      montelukast (SINGULAIR) 10 mg tablet Take 1 tablet (10 mg total) by mouth daily at bedtime, Starting Tue 5/29/2018, Normal      ondansetron (ZOFRAN) 4 mg tablet Take 1 tablet (4 mg total) by mouth every 8 (eight) hours as needed for nausea or vomiting, Starting Mon 6/4/2018, Normal      ondansetron (ZOFRAN-ODT) 4 mg disintegrating tablet Take 1 tablet (4 mg total) by mouth every 8 (eight) hours as needed for nausea or vomiting, Starting Tue 6/26/2018, Normal      oxyCODONE-acetaminophen (PERCOCET) 7 5-325 MG per tablet Take 1 tablet by mouth every 6 (six) hours as needed (pain) Max Daily Amount: 4 tablets, Starting Tue 6/26/2018, Normal      polyethylene glycol (GOLYTELY) 4000 mL solution Take 4,000 mL by mouth once for 1 dose, Starting Wed 3/28/2018, Normal      QUEtiapine (SEROquel) 100 mg tablet Take 1 tablet (100 mg total) by mouth daily at bedtime, Starting Mon 6/11/2018, Normal      simethicone (MYLICON) 457 MG chewable tablet Chew 1 tablet (125 mg total) every 6 (six) hours as needed for flatulence, Starting Tue 6/5/2018, Normal      sucralfate (CARAFATE) 1 g/10 mL suspension Take 10 mL (1 g total) by mouth 3 (three) times a day, Starting Wed 7/11/2018, Normal      TiZANidine (ZANAFLEX) 4 MG capsule Take 1 capsule (4 mg total) by mouth 3 (three) times a day, Starting Tue 4/24/2018, Normal           No discharge procedures on file      ED Provider  Electronically Signed by           Josee Winter PA-C  07/14/18 1684

## 2018-07-14 NOTE — DISCHARGE INSTRUCTIONS
Abdominal Pain   WHAT YOU NEED TO KNOW:   Abdominal pain can be dull, achy, or sharp  You may have pain in one area of your abdomen, or in your entire abdomen  Your pain may be caused by a condition such as constipation, food sensitivity or poisoning, infection, or a blockage  Abdominal pain can also be from a hernia, appendicitis, or an ulcer  Liver, gallbladder, or kidney conditions can also cause abdominal pain  The cause of your abdominal pain may be unknown  DISCHARGE INSTRUCTIONS:   Return to the emergency department if:   · You have new chest pain or shortness of breath  · You have pulsing pain in your upper abdomen or lower back that suddenly becomes constant  · Your pain is in the right lower abdominal area and worsens with movement  · You have a fever over 100 4°F (38°C) or shaking chills  · You are vomiting and cannot keep food or liquids down  · Your pain does not improve or gets worse over the next 8 to 12 hours  · You see blood in your vomit or bowel movements, or they look black and tarry  · Your skin or the whites of your eyes turn yellow  · You are a woman and have a large amount of vaginal bleeding that is not your monthly period  Contact your healthcare provider if:   · You have pain in your lower back  · You are a man and have pain in your testicles  · You have pain when you urinate  · You have questions or concerns about your condition or care  Follow up with your healthcare provider within 24 hours or as directed:  Write down your questions so you remember to ask them during your visits  Medicines:   · Medicines  may be given to calm your stomach and prevent vomiting or to decrease pain  Ask how to take pain medicine safely  · Take your medicine as directed  Contact your healthcare provider if you think your medicine is not helping or if you have side effects  Tell him of her if you are allergic to any medicine   Keep a list of the medicines, vitamins, and herbs you take  Include the amounts, and when and why you take them  Bring the list or the pill bottles to follow-up visits  Carry your medicine list with you in case of an emergency  © 2017 [employer] Information is for End User's use only and may not be sold, redistributed or otherwise used for commercial purposes  All illustrations and images included in CareNotes® are the copyrighted property of [employer]  or [employer]  The above information is an  only  It is not intended as medical advice for individual conditions or treatments  Talk to your doctor, nurse or pharmacist before following any medical regimen to see if it is safe and effective for you  Pancreatitis   WHAT YOU NEED TO KNOW:   Pancreatitis is inflammation of your pancreas  The pancreas is an organ that makes insulin  It also makes enzymes (digestive juices) that help your body digest food  Pancreatitis may be an acute (short-term) problem that happens only once  It may become a chronic (long-term) problem that comes and goes over time  DISCHARGE INSTRUCTIONS:   Return to the emergency department if:   · You have severe pain in your abdomen and you are vomiting  Contact your healthcare provider if:   · You have a fever  · You continue to lose weight  · Your skin or the whites of your eyes turn yellow  · You have questions or concerns about your condition or care  Medicines: You may need any of the following:  · Antibiotics  treat a bacterial infection  · Prescription pain medicine  may be given  Ask your healthcare provider how to take this medicine safely  Some prescription pain medicines contain acetaminophen  Do not take other medicines that contain acetaminophen without talking to your healthcare provider  Too much acetaminophen may cause liver damage  Prescription pain medicine may cause constipation   Ask your healthcare provider how to prevent or treat constipation  · Take your medicine as directed  Contact your healthcare provider if you think your medicine is not helping or if you have side effects  Tell him or her if you are allergic to any medicine  Keep a list of the medicines, vitamins, and herbs you take  Include the amounts, and when and why you take them  Bring the list or the pill bottles to follow-up visits  Carry your medicine list with you in case of an emergency  Self-care:   · Rest  when you feel it is needed  Slowly start to do more each day  Return to your usual activities as directed  · Do not drink any alcohol  If you need help to stop drinking, contact the following organization:   ¨ Alcoholics Anonymous  Web Address: http://www [employer] [place]/      · Ask your healthcare provider or dietitian about the best foods to eat  You may need to eat foods that are low in fat if you have chronic pancreatitis  Follow up with your healthcare provider as directed:  Write down your questions so you remember to ask them during your visits  © 2017 [employer] Information is for End User's use only and may not be sold, redistributed or otherwise used for commercial purposes  All illustrations and images included in CareNotes® are the copyrighted property of [employer]  or [employer] Mat[employer]  The above information is an  only  It is not intended as medical advice for individual conditions or treatments  Talk to your doctor, nurse or pharmacist before following any medical regimen to see if it is safe and effective for you  Epigastric Pain   WHAT YOU NEED TO KNOW:   Epigastric pain is felt in the middle of the upper abdomen, between the ribs and the bellybutton  The pain may be mild or severe  Pain may spread from or to another part of your body  Epigastric pain may be a sign of a serious health problem that needs to be treated     DISCHARGE INSTRUCTIONS:   Call 911 for any of the following:   · You have any of the following signs of a heart attack:      ¨ Squeezing, pressure, or pain in your chest that lasts longer than 5 minutes or returns    ¨ Discomfort or pain in your back, neck, jaw, stomach, or arm     ¨ Trouble breathing    ¨ Nausea or vomiting    ¨ Lightheadedness or a sudden cold sweat, especially with chest pain or trouble breathing    · You have severe pain that radiates to your jaw or back  Return to the emergency department if:   · You have severe pain that starts suddenly and quickly gets worse  · You cannot have a bowel movement and are vomiting  · You vomit or cough up blood  · You see blood in your urine or bowel movement  · You feel drowsy and your breathing is slower than usual   Contact your healthcare provider if:   · You have a fever or chills  · You have yellowing of your skin or the whites of your eyes  · You vomit often or several times in a row  · You lose weight without trying  · You have symptoms for longer than 2 weeks  · You have questions or concerns about your condition or care  Medicines:   · Medicines  may be given to treat pain or stop vomiting  You may also need medicines to reduce or control stomach acid, or treat an infection  · Take your medicine as directed  Contact your healthcare provider if you think your medicine is not helping or if you have side effects  Tell him of her if you are allergic to any medicine  Keep a list of the medicines, vitamins, and herbs you take  Include the amounts, and when and why you take them  Bring the list or the pill bottles to follow-up visits  Carry your medicine list with you in case of an emergency  Follow up with your healthcare provider as directed:  Write down your questions so you remember to ask them during your visits  Manage your symptoms:   · Keep a record of your symptoms  Include when the pain starts, how long it lasts, and if it is sharp or dull   Also include any foods you ate or activities you did before the pain started  Keep track of anything that helped the pain  · Eat a variety of healthy foods  Healthy foods include fruits, vegetables, whole-grain breads, low-fat dairy products, beans, lean meats, and fish  Ask if you need to be on a special diet  Certain foods may cause your pain, such as alcohol or foods that are high in fat  You may need to eat smaller meals and to eat more often than usual     · Drink liquids as directed  Ask how much liquid to drink each day and which liquids are best for you  Do not have drinks that contain alcohol or caffeine  © 2017 [address] Information is for End User's use only and may not be sold, redistributed or otherwise used for commercial purposes  All illustrations and images included in CareNotes® are the copyrighted property of [employer]  or Gian Matias  The above information is an  only  It is not intended as medical advice for individual conditions or treatments  Talk to your doctor, nurse or pharmacist before following any medical regimen to see if it is safe and effective for you

## 2018-07-15 PROCEDURE — 99284 EMERGENCY DEPT VISIT MOD MDM: CPT

## 2018-07-15 NOTE — ED PROVIDER NOTES
History  Chief Complaint   Patient presents with    Vomiting     Abdominal pain, nausea, vomiting  Seen here yesterday for same and diagnosed with pancreatitis  Denies fever  History provided by:  Patient and medical records  Abdominal Pain   Pain location:  Epigastric  Pain quality: dull and gnawing    Pain radiates to:  Back  Pain severity:  Severe  Onset quality:  Gradual  Duration:  1 week  Timing:  Constant  Progression:  Worsening  Chronicity:  New  Context comment:  Pt seen and evaluated yesterday for similar symptoms  mild elevation of lipase  tolerated po, d/c'd to home  Relieved by:  Nothing  Worsened by:  Nothing  Ineffective treatments:  None tried  Associated symptoms: nausea    Associated symptoms: no anorexia, no belching, no chest pain, no chills, no constipation, no cough, no diarrhea, no dysuria, no fatigue, no fever, no hematuria, no melena, no shortness of breath, no sore throat, no vaginal bleeding and no vaginal discharge        Prior to Admission Medications   Prescriptions Last Dose Informant Patient Reported? Taking?    Elastic Bandages & Supports (THUMB SPLINT/LEFT MEDIUM) MISC   No No   Sig: by Does not apply route daily   Humidifiers (COOL MIST HUMIDIFIER 1 GALLON) MISC  Self No No   Sig: by Does not apply route as needed (shortness of breath)   Insulin Pen Needle (PEN NEEDLES) 31G X 8 MM MISC   No No   Sig: Inject 1 Stick under the skin 4 (four) times a day (with meals and at bedtime)   Lancets (FREESTYLE) lancets   No No   Sig: by Other route 3 (three) times a day   QUEtiapine (SEROquel) 100 mg tablet   No No   Sig: Take 1 tablet (100 mg total) by mouth daily at bedtime   TiZANidine (ZANAFLEX) 4 MG capsule   No No   Sig: Take 1 capsule (4 mg total) by mouth 3 (three) times a day   amLODIPine (NORVASC) 5 mg tablet   No No   Sig: Take 1 tablet (5 mg total) by mouth daily   amphetamine-dextroamphetamine (ADDERALL XR) 20 MG 24 hr capsule   No No   Sig: Take 1 capsule (20 mg total) by mouth 2 (two) times a day Max Daily Amount: 40 mg   cetirizine (ZyrTEC) 10 mg tablet   No No   Sig: Take 1 tablet (10 mg total) by mouth daily   cycloSPORINE (RESTASIS) 0 05 % ophthalmic emulsion  Self Yes No   Sig: Administer 1 drop to both eyes 2 (two) times a day   famotidine (PEPCID) 20 mg tablet   No No   Sig: Take 1 tablet (20 mg total) by mouth 2 (two) times a day   fluticasone (FLONASE) 50 mcg/act nasal spray   No No   Si spray into each nostril daily   gabapentin (NEURONTIN) 300 mg capsule   No No   Sig: Take 1 capsule (300 mg total) by mouth 3 (three) times a day   glucose blood (FREESTYLE LITE) test strip  Self No No   Si each by Other route 3 (three) times a day   insulin aspart (NovoLOG) 100 Units/mL injection pen   No No   Sig: Inject 6 Units under the skin 3 (three) times a day with meals   insulin glargine (LANTUS SOLOSTAR) injection pen 100 units/mL  Self No No   Sig: Inject 0 22 mL (22 Units total) under the skin daily at bedtime   lidocaine (XYLOCAINE) 5 % ointment   No No   Sig: Apply topically 2 (two) times a day as needed for mild pain Please apply 5g twice daily as needed   metoprolol succinate (TOPROL-XL) 25 mg 24 hr tablet  Self Yes No   Sig: Take 1 tablet by mouth daily   montelukast (SINGULAIR) 10 mg tablet   No No   Sig: Take 1 tablet (10 mg total) by mouth daily at bedtime   ondansetron (ZOFRAN) 4 mg tablet   No No   Sig: Take 1 tablet (4 mg total) by mouth every 8 (eight) hours as needed for nausea or vomiting   ondansetron (ZOFRAN-ODT) 4 mg disintegrating tablet   No No   Sig: Take 1 tablet (4 mg total) by mouth every 8 (eight) hours as needed for nausea or vomiting   oxyCODONE-acetaminophen (PERCOCET) 7 5-325 MG per tablet   No No   Sig: Take 1 tablet by mouth every 6 (six) hours as needed (pain) Max Daily Amount: 4 tablets   polyethylene glycol (GOLYTELY) 4000 mL solution   No No   Sig: Take 4,000 mL by mouth once for 1 dose   simethicone (MYLICON) 646 MG chewable tablet No No   Sig: Chew 1 tablet (125 mg total) every 6 (six) hours as needed for flatulence   sucralfate (CARAFATE) 1 g/10 mL suspension   No No   Sig: Take 10 mL (1 g total) by mouth 3 (three) times a day      Facility-Administered Medications: None       Past Medical History:   Diagnosis Date    Anemia of chronic disease     Anxiety     Asthma     Asthma     Chronic abdominal pain     CKD (chronic kidney disease) stage 3, GFR 30-59 ml/min     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[place] [address] )     Cushing syndrome [place] [address] )     Diabetes mellitus ([place] [address] )     DVT (deep venous thrombosis) [place] [address] )     History of acute pancreatitis     felt secondary to Bactrim    HTN (hypertension)     Hyperlipidemia     Hypertension     Microscopic polyangiitis ([place] [address] )     Morbid obesity ([place] [address] )     MPA (microscopic polyangiitis) (HCC)     Ovarian cyst     Renal disorder     Self-inflicted injury     self inflicted skin wounds    Wegener's granulomatosis with renal involvement ([place] [address] ) 2015       Past Surgical History:   Procedure Laterality Date    ESOPHAGOGASTRODUODENOSCOPY  09/11/2015    mild antral gastritis    RELEASE SCAR CONTRACTURE / GRAFT REPAIRS OF HAND         Family History   Problem Relation Age of Onset    Colon cancer Neg Hx     Drug abuse Neg Hx         mother father    Mental illness Neg Hx         disorder, mother father     I have reviewed and agree with the history as documented  Social History   Substance Use Topics    Smoking status: Former Smoker     Types: Cigarettes     Quit date: 2010    Smokeless tobacco: Never Used    Alcohol use No        Review of Systems   Constitutional: Negative for appetite change, chills, diaphoresis, fatigue and fever  HENT: Negative for congestion, dental problem, rhinorrhea and sore throat  Eyes: Negative for pain  Respiratory: Negative for cough, chest tightness and shortness of breath  Cardiovascular: Negative for chest pain and leg swelling     Gastrointestinal: Positive for abdominal pain and nausea  Negative for anorexia, blood in stool, constipation, diarrhea and melena  Endocrine: Negative for polydipsia, polyphagia and polyuria  Genitourinary: Negative for difficulty urinating, dyspareunia, dysuria, enuresis, flank pain, frequency, hematuria, pelvic pain, vaginal bleeding and vaginal discharge  Musculoskeletal: Negative for back pain  Skin: Negative for color change and rash  Neurological: Negative for dizziness, weakness and light-headedness  Psychiatric/Behavioral: Negative for hallucinations and suicidal ideas  Physical Exam  Physical Exam   Constitutional: She is oriented to person, place, and time  She appears well-developed and well-nourished  HENT:   Mouth/Throat: No oropharyngeal exudate  TMs normal bilaterally no pharyngeal erythema no rhinorrhea nontender palpation of sinuses, normal looking turbinates   Eyes: Conjunctivae and EOM are normal    Neck: Normal range of motion  Neck supple  No meningeal signs   Cardiovascular: Normal rate, regular rhythm, normal heart sounds and intact distal pulses  Pulmonary/Chest: Effort normal and breath sounds normal  No respiratory distress  She has no wheezes  She has no rales  She exhibits no tenderness  Abdominal: Soft  Bowel sounds are normal  She exhibits no distension and no mass  There is tenderness  There is no rebound and no guarding  No hernia  No cvat   Musculoskeletal: Normal range of motion  She exhibits no edema  Lymphadenopathy:     She has no cervical adenopathy  Neurological: She is alert and oriented to person, place, and time  No cranial nerve deficit  Skin: No rash noted  No erythema  No edema   Psychiatric: She has a normal mood and affect  Her behavior is normal    Nursing note and vitals reviewed        Vital Signs  ED Triage Vitals   Temperature Pulse Respirations Blood Pressure SpO2   07/14/18 2041 07/14/18 2039 07/14/18 2039 07/14/18 2039 07/14/18 2039   (!) 97 3 °F (36 3 °C) 96 20 (!) 183/113 100 %      Temp Source Heart Rate Source Patient Position - Orthostatic VS BP Location FiO2 (%)   07/14/18 2041 07/14/18 2039 07/14/18 2039 07/14/18 2039 --   Oral Monitor Sitting Left arm       Pain Score       07/14/18 2039       Worst Possible Pain           Vitals:    07/14/18 2039 07/14/18 2300   BP: (!) 183/113 (!) 174/86   Pulse: 96    Patient Position - Orthostatic VS: Sitting        Visual Acuity      ED Medications  Medications   ondansetron (ZOFRAN) injection 4 mg (4 mg Intravenous Given 7/14/18 2050)   morphine injection 2 mg (2 mg Intravenous Given 7/14/18 2136)   metoclopramide (REGLAN) injection 10 mg (10 mg Intravenous Given 7/14/18 2135)       Diagnostic Studies  Results Reviewed     Procedure Component Value Units Date/Time    Comprehensive metabolic panel [[id number]]  (Abnormal) Collected:  07/14/18 2131    Lab Status:  Final result Specimen:  Blood from Arm, Right Updated:  07/14/18 2223     Sodium 142 mmol/L      Potassium 3 9 mmol/L      Chloride 104 mmol/L      CO2 27 mmol/L      Anion Gap 11 mmol/L      BUN 33 (H) mg/dL      Creatinine 2 27 (H) mg/dL      Glucose 176 (H) mg/dL      Calcium 9 3 mg/dL      AST 22 U/L      ALT 26 U/L      Alkaline Phosphatase 130 (H) U/L      Total Protein 8 1 g/dL      Albumin 3 8 g/dL      Total Bilirubin 0 25 mg/dL      eGFR 25 ml/min/1 73sq m     Narrative:         National Kidney Disease Education Program recommendations are as follows:  GFR calculation is accurate only with a steady state creatinine  Chronic Kidney disease less than 60 ml/min/1 73 sq  meters  Kidney failure less than 15 ml/min/1 73 sq  meters      Lipase [[id number]]  (Normal) Collected:  07/14/18 2131    Lab Status:  Final result Specimen:  Blood from Arm, Right Updated:  07/14/18 2223     Lipase 339 u/L     CBC and differential [[id number]]  (Abnormal) Collected:  07/14/18 2048    Lab Status:  Final result Specimen:  Blood from Arm, Right Updated:  07/14/18 2109     WBC 10 50 (H) Thousand/uL      RBC 4 60 Million/uL      Hemoglobin 14 2 g/dL      Hematocrit 42 1 %      MCV 92 fL      MCH 30 9 pg      MCHC 33 7 g/dL      RDW 14 2 %      MPV 10 1 fL      Platelets 012 Thousands/uL      Neutrophils Relative 68 %      Lymphocytes Relative 24 %      Monocytes Relative 7 %      Eosinophils Relative 1 %      Basophils Relative 0 %      Neutrophils Absolute 7 18 Thousands/µL      Lymphocytes Absolute 2 55 Thousands/µL      Monocytes Absolute 0 68 Thousand/µL      Eosinophils Absolute 0 07 Thousand/µL      Basophils Absolute 0 02 Thousands/µL     POCT urinalysis dipstick [[id number]]     Lab Status:  No result Specimen:  Urine from Urine, Other                  CT abdomen pelvis wo contrast   ED Interpretation by Liz Barclay MD (07/14 2230)   1 7 cm adenoma in the left adrenal gland         No renal stones   Normal appendix   No small bowel obstruction  Final Result by Ghassan Bennett MD (07/14 2215)      1 7 cm adenoma in the left adrenal gland  No renal stones  Normal appendix  No small bowel obstruction  Consider contrast exam in the appropriate clinical setting  Workstation performed: [id number]                    Procedures  Procedures       Phone Contacts  ED Phone Contact    ED Course  ED Course as of Jul 14 2305   Sat Jul 14, 2018 2252 Patient's lab work is reviewed  Patient's lipase has normalized  Patient's creatinine is stable in setting improved from yesterday  Patient's CT is negative  Patient is currently tolerating p o  Repeat abdominal exam is benign  Patient understands likely diagnosis, treatment plan, need for outpatient follow-up, reasons return emergently to the emergency department    Patient is appropriate for discharge                                MDM  Number of Diagnoses or Management Options  Diagnosis management comments: Re-evaluation of epigastric pain that radiates through to the back with nausea and vomiting-will repeat abdominal labs, urine dip urine pregnancy make fluids, treat symptoms  CT head rule out acute CNS pathology  CritCare Time    Disposition  Final diagnoses:   Acute abdominal pain   Nausea & vomiting   Dehydration   CKD (chronic kidney disease)   HTN (hypertension)     Time reflects when diagnosis was documented in both MDM as applicable and the Disposition within this note     Time User Action Codes Description Comment    7/14/2018 10:53 PM Erik PALACIOS Add [R10 9] Acute abdominal pain     7/14/2018 10:53 PM Joshua Cavanaugh Add [R11 2] Nausea & vomiting     7/14/2018 10:53 PM Long Moura Add [E86 0] Dehydration     7/14/2018 10:53 PM Joshua Cavanaugh [place] Add [N18 9] CKD (chronic kidney disease)     7/14/2018 10:53 PM Joshua Cavanaughia Add [I10] HTN (hypertension)       ED Disposition     None      Follow-up Information     Follow up With Specialties Details Why [address] [facility] [address], [place] Family Medicine Schedule an appointment as soon as possible for a visit in 2 days  [address]            Patient's Medications   Discharge Prescriptions    METOCLOPRAMIDE (REGLAN) 5 MG TABLET    Take 1 tablet (5 mg total) by mouth every 6 (six) hours for 12 doses       Start Date: 7/14/2018 End Date: 7/17/2018       Order Dose: 5 mg       Quantity: 12 tablet    Refills: 0     No discharge procedures on file      ED Provider  Electronically Signed by           Bonnie Sanchez MD  07/14/18 8292

## 2018-07-15 NOTE — DISCHARGE INSTRUCTIONS
Acute Nausea and Vomiting, Ambulatory Care   GENERAL INFORMATION:   Acute nausea and vomiting  starts suddenly, gets worse quickly, and lasts a short time  Nausea and vomiting may be caused by pregnancy, alcohol, infection, or medicines  Common related symptoms include the following:   · Fever    · Abdominal swelling    · Pain, tenderness, or a lump in the abdomen    · Splashing sounds heard in your stomach when you move  Seek immediate care for the following symptoms:   · Blood in your vomit or bowel movements    · Sudden, severe pain in your chest and upper abdomen after hard vomiting    · Dizziness, dry mouth, and thirst    · Urinating very little or not at all    · Muscle weakness, leg cramps, and trouble breathing    · A heart beat that is faster than normal    · Vomiting for more than 48 hours  Treatment for acute nausea and vomiting  may include medicines to calm your stomach and stop the vomiting  You may need IV fluids if you are dehydrated  Manage your nausea and vomiting:   · Drink liquids as directed to prevent dehydration  Ask how much liquid to drink each day and which liquids are best for you  You may need to drink an oral rehydration solution (ORS)  ORS contains water, salts, and sugar that are needed to replace the lost body fluids  Ask what kind of ORS to use, how much to drink, and where to get it  · Eat smaller meals, more often  Eat small amounts of food every 2 to 3 hours, even if you are not hungry  Food in your stomach may help decrease your nausea  · Avoid stress  Find ways to relax and manage your stress  Headaches due to stress may cause nausea and vomiting  Get more rest and sleep  Follow up with your healthcare provider as directed:  Write down your questions so you remember to ask them during your visits  CARE AGREEMENT:   You have the right to help plan your care  Learn about your health condition and how it may be treated   Discuss treatment options with your caregivers to decide what care you want to receive  You always have the right to refuse treatment  The above information is an  only  It is not intended as medical advice for individual conditions or treatments  Talk to your doctor, nurse or pharmacist before following any medical regimen to see if it is safe and effective for you  © 2014 [address] is for End User's use only and may not be sold, redistributed or otherwise used for commercial purposes  All illustrations and images included in CareNotes® are the copyrighted property of [employer]  or [employer]  Abdominal Pain, Ambulatory Care   GENERAL INFORMATION:   Abdominal pain  can be dull, achy, or sharp  You may have pain in one area of your abdomen, or in your entire abdomen  Your pain may be caused by constipation, food sensitivity or poisoning, infection, or a blockage  Abdominal pain can also be caused by a hernia, appendicitis, or an ulcer  The cause of your abdominal pain may be unknown  Seek immediate care for the following symptoms:   · New chest pain or shortness of breath    · Pulsing pain in your upper abdomen or lower back that suddenly becomes constant    · Pain in the right lower abdominal area that worsens with movement    · Fever over 100 4°F (38°C) or shaking chills    · Vomiting and you cannot keep food or fluids down    · Pain does not improve or gets worse over the next 8 to 12 hours    · Blood in your vomit or bowel movements, or they look black and tarry    · Skin or the whites of your eyes turn yellow    · Large amount of vaginal bleeding that is not your monthly period  Treatment for abdominal pain  may include medicine to calm your stomach, prevent vomiting, or decrease pain  Follow up with your healthcare provider as directed:  Write down your questions so you remember to ask them during your visits  CARE AGREEMENT:   You have the right to help plan your care   Learn about your health condition and how it may be treated  Discuss treatment options with your caregivers to decide what care you want to receive  You always have the right to refuse treatment  The above information is an  only  It is not intended as medical advice for individual conditions or treatments  Talk to your doctor, nurse or pharmacist before following any medical regimen to see if it is safe and effective for you  © 2014 [address] is for End User's use only and may not be sold, redistributed or otherwise used for commercial purposes  All illustrations and images included in CareNotes® are the copyrighted property of [employer]  or [employer]

## 2018-07-15 NOTE — ED NOTES
Spoke with [facility] at this time  Transportation to residence arranged for [address]        Christal Hardin RN  07/14/18 2475

## 2018-07-18 RX ORDER — TIZANIDINE HYDROCHLORIDE 4 MG/1
4 CAPSULE, GELATIN COATED ORAL 3 TIMES DAILY
Qty: 90 CAPSULE | Refills: 2 | Status: SHIPPED | OUTPATIENT
Start: 2018-07-18 | End: 2018-07-20 | Stop reason: SDUPTHER

## 2018-07-20 DIAGNOSIS — M54.41 CHRONIC LOW BACK PAIN WITH BILATERAL SCIATICA, UNSPECIFIED BACK PAIN LATERALITY: ICD-10-CM

## 2018-07-20 DIAGNOSIS — G89.29 CHRONIC LOW BACK PAIN WITH BILATERAL SCIATICA, UNSPECIFIED BACK PAIN LATERALITY: ICD-10-CM

## 2018-07-20 DIAGNOSIS — M54.42 CHRONIC LOW BACK PAIN WITH BILATERAL SCIATICA, UNSPECIFIED BACK PAIN LATERALITY: ICD-10-CM

## 2018-07-23 RX ORDER — TIZANIDINE HYDROCHLORIDE 4 MG/1
4 CAPSULE, GELATIN COATED ORAL 3 TIMES DAILY
Qty: 90 CAPSULE | Refills: 2 | Status: SHIPPED | OUTPATIENT
Start: 2018-07-23 | End: 2018-11-14 | Stop reason: SDUPTHER

## 2018-07-24 DIAGNOSIS — G89.4 CHRONIC PAIN SYNDROME: ICD-10-CM

## 2018-07-24 DIAGNOSIS — R10.13 EPIGASTRIC PAIN: ICD-10-CM

## 2018-07-24 DIAGNOSIS — M31.30 WEGENER'S GRANULOMATOSIS: Chronic | ICD-10-CM

## 2018-07-24 DIAGNOSIS — F31.9 BIPOLAR 1 DISORDER (HCC): ICD-10-CM

## 2018-07-24 DIAGNOSIS — J30.1 SEASONAL ALLERGIC RHINITIS DUE TO POLLEN: ICD-10-CM

## 2018-07-25 RX ORDER — DEXTROAMPHETAMINE SACCHARATE, AMPHETAMINE ASPARTATE MONOHYDRATE, DEXTROAMPHETAMINE SULFATE AND AMPHETAMINE SULFATE 5; 5; 5; 5 MG/1; MG/1; MG/1; MG/1
20 CAPSULE, EXTENDED RELEASE ORAL 2 TIMES DAILY
Qty: 60 CAPSULE | Refills: 0 | Status: SHIPPED | OUTPATIENT
Start: 2018-07-25 | End: 2018-08-20 | Stop reason: SDUPTHER

## 2018-07-25 RX ORDER — ONDANSETRON 4 MG/1
4 TABLET, ORALLY DISINTEGRATING ORAL EVERY 8 HOURS PRN
Qty: 30 TABLET | Refills: 0 | Status: SHIPPED | OUTPATIENT
Start: 2018-07-25 | End: 2018-08-20 | Stop reason: SDUPTHER

## 2018-07-25 RX ORDER — OXYCODONE AND ACETAMINOPHEN 7.5; 325 MG/1; MG/1
1 TABLET ORAL EVERY 6 HOURS PRN
Qty: 120 TABLET | Refills: 0 | Status: SHIPPED | OUTPATIENT
Start: 2018-07-25 | End: 2018-08-20 | Stop reason: SDUPTHER

## 2018-07-26 ENCOUNTER — OFFICE VISIT (OUTPATIENT)
Dept: FAMILY MEDICINE CLINIC | Facility: CLINIC | Age: 48
End: 2018-07-26
Payer: COMMERCIAL

## 2018-07-26 VITALS
TEMPERATURE: 97.2 F | HEIGHT: 61 IN | WEIGHT: 202 LBS | RESPIRATION RATE: 18 BRPM | BODY MASS INDEX: 38.14 KG/M2 | SYSTOLIC BLOOD PRESSURE: 106 MMHG | DIASTOLIC BLOOD PRESSURE: 62 MMHG | OXYGEN SATURATION: 95 % | HEART RATE: 78 BPM

## 2018-07-26 DIAGNOSIS — R29.898 WEAKNESS OF BOTH UPPER EXTREMITIES: ICD-10-CM

## 2018-07-26 DIAGNOSIS — R29.898 WEAKNESS OF BOTH LOWER EXTREMITIES: ICD-10-CM

## 2018-07-26 DIAGNOSIS — M31.30 WEGENER'S GRANULOMATOSIS: Chronic | ICD-10-CM

## 2018-07-26 DIAGNOSIS — M25.50 ARTHRALGIA OF MULTIPLE JOINTS: ICD-10-CM

## 2018-07-26 DIAGNOSIS — E11.8 TYPE 2 DIABETES MELLITUS WITH COMPLICATION, UNSPECIFIED WHETHER LONG TERM INSULIN USE: Primary | ICD-10-CM

## 2018-07-26 DIAGNOSIS — K21.9 GASTROESOPHAGEAL REFLUX DISEASE, ESOPHAGITIS PRESENCE NOT SPECIFIED: ICD-10-CM

## 2018-07-26 DIAGNOSIS — G47.411 CATAPLEXY: ICD-10-CM

## 2018-07-26 DIAGNOSIS — R10.13 EPIGASTRIC PAIN: ICD-10-CM

## 2018-07-26 LAB — SL AMB POCT HEMOGLOBIN AIC: 6.1

## 2018-07-26 PROCEDURE — 83036 HEMOGLOBIN GLYCOSYLATED A1C: CPT | Performed by: PHYSICIAN ASSISTANT

## 2018-07-26 PROCEDURE — 3044F HG A1C LEVEL LT 7.0%: CPT | Performed by: FAMILY MEDICINE

## 2018-07-26 PROCEDURE — 99214 OFFICE O/P EST MOD 30 MIN: CPT | Performed by: PHYSICIAN ASSISTANT

## 2018-07-26 RX ORDER — FAMOTIDINE 20 MG/1
20 TABLET, FILM COATED ORAL 2 TIMES DAILY
Qty: 180 TABLET | Refills: 1 | Status: SHIPPED | OUTPATIENT
Start: 2018-07-26 | End: 2019-01-15 | Stop reason: SDUPTHER

## 2018-07-26 NOTE — PROGRESS NOTES
Assessment/Plan:    IDDM (insulin dependent diabetes mellitus) ([facility] )  Lab Results   Component Value Date    HGBA1C 6 3 12/22/2017       No results for input(s): POCGLU in the last 72 hours  Blood Sugar Average: Last 72 hrs:   A1c today was 6 1%  At this time recommend continuing monitoring blood sugar, and using insulins as needed  Gave referral to [facility] for sight for diabetic eye exam   Discussed the importance of controlling diabetes with diet  Cataplexy  Recommend following up with [facility] for further evaluation of cataplexy  With concerns of injury, and difficulty with ambulation will give referral to wheelchair clinic to be evaluated for wheelchair or scooter  Chronic pain  Stable  [facility] drug database was reviewed  No concerns at this time  Gastroesophageal reflux disease  At this time will continue with Pepcid since it has been relieving symptoms  Strongly encourage patient that she should follow up with GI for further evaluation and also to discuss possible lactose intolerance  Would recommend getting EGD and colonoscopy completed  Weakness of both upper extremities  Ordered EMG for further evaluation  Diagnoses and all orders for this visit:    Type 2 diabetes mellitus with complication, unspecified whether long term insulin use ([facility] )  -     POCT hemoglobin A1c  -     Ambulatory referral to Ophthalmology; Future    Weakness of both upper extremities  -     EMG 2 Limb Upper Extremity; Future    Arthralgia of multiple joints  -     Ambulatory referral to Physical Therapy; Future    Weakness of both lower extremities  -     Ambulatory referral to Physical Therapy; Future    Cataplexy  -     Ambulatory referral to Physical Therapy; Future    Wegener's granulomatosis ([facility] )    Gastroesophageal reflux disease, esophagitis presence not specified          Subjective:      Patient ID: Stone Carter is a 52 y o  female      45-year-old female presenting with multiple concerns  Patient has been diagnosed with cataplexy, lower extremity weakness, wegener's granulomatous  Because of this patient has difficulty ambulating  With concerns for cataplexy, could she could lose muscle function and collapse, which could result in injury  She also has lower extremity weakness, and wears bilateral knee braces, which makes it difficult for her to ambulate over long distances without needing a break  The her Wegener's Granulomatous leads to generalized fatigue, and again makes it difficult for her to walk over extended distances  On her best days, she can go about 1 block without needing a break  She has used as scooter in the past, and would benefit from continued use of this for ambulation  Patient also has concerns of her recurrent abdominal pain  Was seen in the emergency room twice last week, and was prescribed Pepcid which she states has been helping with her abdominal pain  Patient believes that she is lactose intolerant  Has notice that the abdominal pain is typically worse after having dairy products  Because of this she has been drinking lactose free milk, and what dairy products she does eat she has been eating in small quantities  Currently has mild right upper quadrant abdominal pain  Denies any nausea, vomiting, diarrhea, constipation  Patient also concerned with weakness in bilateral hands  States that recently she has been noticing she is dropping things when she is trying to pick it up  Does have some numbness and tingling in fingertips  Occasionally has pain in bilateral wrist   When she is grabbing something, she states that it is slowly slip from her hand  Has never had similar symptoms in the past   Denies any headaches or neck pain  Patient is concerned about her diabetes  She has not been taking her insulin for several months, but has noticed an increase in her blood sugar recently  Since then she has restarted her insulin    She also needs the phone number once again for ophthalmologist for diabetic eye exam           The following portions of the patient's history were reviewed and updated as appropriate:   She  has a past medical history of Anemia of chronic disease; Anxiety; Asthma; Asthma; Chronic abdominal pain; CKD (chronic kidney disease) stage 3, GFR 30-59 ml/min; 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[facility] ); Cushing syndrome ([facility] ); Diabetes mellitus ([facility] ); DVT (deep venous thrombosis) ([facility] ); History of acute pancreatitis; HTN (hypertension); Hyperlipidemia; Hypertension; Microscopic polyangiitis ([facility] ); Morbid obesity ([facility] ); MPA (microscopic polyangiitis) ([facility] ); Ovarian cyst; Renal disorder; Self-inflicted injury; and Wegener's granulomatosis with [facility] involvement ([facility] ) (2015)    She   Patient Active Problem List    Diagnosis Date Noted    Gastroesophageal reflux disease 07/26/2018    Weakness of both upper extremities 07/26/2018    Seasonal allergic rhinitis due to pollen 04/26/2018    Plantar wart, right foot 04/16/2018    Chronic pelvic pain in female 04/13/2018    Diarrhea of presumed infectious origin 03/28/2018    Epigastric pain 01/07/2018    Pancreatitis 01/07/2018    Ovarian cyst 01/07/2018    Postherpetic neuralgia 01/07/2018    Bipolar 1 disorder ([facility] ) 12/21/2017    Chronic flank pain 12/21/2017    Chronic pain 07/25/2017    Noncompliance 07/25/2017    Cataplexy 12/07/2016    Weakness of both lower extremities 11/29/2016    Acute pancreatitis 07/24/2016    CKD (chronic kidney disease) stage 3, GFR 30-59 ml/min     MPA (microscopic polyangiitis) (HCC)     Asthma     HTN (hypertension)     Arthralgia of multiple joints 05/24/2016    IDDM (insulin dependent diabetes mellitus) ([facility] ) 04/06/2016    Dyslipidemia 04/06/2016    Wegener's granulomatosis ([facility] ) 04/06/2016    Anemia of chronic disease 07/01/2015     She  has a past surgical history that includes Esophagogastroduodenoscopy (09/11/2015) and Release scar contracture / graft repairs of hand  Her family history is not on file  She  reports that she quit smoking about 8 years ago  Her smoking use included Cigarettes  She has never used smokeless tobacco  She reports that she uses drugs, including Marijuana  She reports that she does not drink alcohol    Current Outpatient Prescriptions   Medication Sig Dispense Refill    amLODIPine (NORVASC) 5 mg tablet Take 1 tablet (5 mg total) by mouth daily 90 tablet 2    amphetamine-dextroamphetamine (ADDERALL XR) 20 MG 24 hr capsule Take 1 capsule (20 mg total) by mouth 2 (two) times a day Max Daily Amount: 40 mg 60 capsule 0    cetirizine (ZyrTEC) 10 mg tablet Take 1 tablet (10 mg total) by mouth daily 90 tablet 0    cycloSPORINE (RESTASIS) 0 05 % ophthalmic emulsion Administer 1 drop to both eyes 2 (two) times a day      Elastic Bandages & Supports (THUMB SPLINT/LEFT MEDIUM) MISC by Does not apply route daily 1 each 0    famotidine (PEPCID) 20 mg tablet Take 1 tablet (20 mg total) by mouth 2 (two) times a day 28 tablet 0    fluticasone (FLONASE) 50 mcg/act nasal spray 1 spray into each nostril daily 16 g 0    gabapentin (NEURONTIN) 300 mg capsule Take 1 capsule (300 mg total) by mouth 3 (three) times a day 270 capsule 1    glucose blood (FREESTYLE LITE) test strip 1 each by Other route 3 (three) times a day 100 each 5    Humidifiers (COOL MIST HUMIDIFIER 1 GALLON) MISC by Does not apply route as needed (shortness of breath) 1 each 0    insulin aspart (NovoLOG) 100 Units/mL injection pen Inject 6 Units under the skin 3 (three) times a day with meals 5 pen 1    insulin glargine (LANTUS SOLOSTAR) injection pen 100 units/mL Inject 0 22 mL (22 Units total) under the skin daily at bedtime 5 pen 1    Insulin Pen Needle (PEN NEEDLES) 31G X 8 MM MISC Inject 1 Stick under the skin 4 (four) times a day (with meals and at bedtime) 100 each 6    Lancets (FREESTYLE) lancets by Other route 3 (three) times a day 100 each 11    lidocaine (XYLOCAINE) 5 % ointment Apply topically 2 (two) times a day as needed for mild pain Please apply 5g twice daily as needed 35 44 g 0    metoprolol succinate (TOPROL-XL) 25 mg 24 hr tablet Take 1 tablet by mouth daily      montelukast (SINGULAIR) 10 mg tablet Take 1 tablet (10 mg total) by mouth daily at bedtime 90 tablet 1    ondansetron (ZOFRAN) 4 mg tablet Take 1 tablet (4 mg total) by mouth every 8 (eight) hours as needed for nausea or vomiting 30 tablet 0    ondansetron (ZOFRAN-ODT) 4 mg disintegrating tablet Take 1 tablet (4 mg total) by mouth every 8 (eight) hours as needed for nausea or vomiting 30 tablet 0    oxyCODONE-acetaminophen (PERCOCET) 7 5-325 MG per tablet Take 1 tablet by mouth every 6 (six) hours as needed (pain) Max Daily Amount: 4 tablets 120 tablet 0    polyethylene glycol (GOLYTELY) 4000 mL solution Take 4,000 mL by mouth once for 1 dose 4000 mL 0    QUEtiapine (SEROquel) 100 mg tablet Take 1 tablet (100 mg total) by mouth daily at bedtime 30 tablet 2    simethicone (MYLICON) 264 MG chewable tablet Chew 1 tablet (125 mg total) every 6 (six) hours as needed for flatulence 40 tablet 0    sucralfate (CARAFATE) 1 g/10 mL suspension Take 10 mL (1 g total) by mouth 3 (three) times a day 420 mL 2    TiZANidine (ZANAFLEX) 4 MG capsule Take 1 capsule (4 mg total) by mouth 3 (three) times a day 90 capsule 2     No current facility-administered medications for this visit  She is allergic to bactrim [sulfamethoxazole-trimethoprim]; haldol [haloperidol]; ibuprofen; navane [thiothixene]; other; prozac [fluoxetine hcl]; and lexapro [escitalopram oxalate]       Review of Systems   Constitutional: Positive for fatigue  Negative for chills, diaphoresis and fever  Eyes: Negative for visual disturbance  Respiratory: Negative for cough, chest tightness, shortness of breath and wheezing  Cardiovascular: Negative for chest pain, palpitations and leg swelling     Gastrointestinal: Positive for abdominal pain  Negative for constipation, diarrhea, nausea and vomiting  Endocrine: Negative for polydipsia, polyphagia and polyuria  Genitourinary: Negative for dysuria and hematuria  Musculoskeletal: Positive for arthralgias, back pain and gait problem  Skin: Negative for wound  Neurological: Positive for weakness and numbness  Negative for dizziness, seizures, light-headedness and headaches  Psychiatric/Behavioral: Negative for dysphoric mood and sleep disturbance  The patient is not nervous/anxious  Objective:      /62 (BP Location: Left arm, Patient Position: Sitting, Cuff Size: Large)   Pulse 78   Temp (!) 97 2 °F (36 2 °C) (Tympanic)   Resp 18   Ht 5' 1" (1 549 m)   Wt 91 6 kg (202 lb)   LMP  (LMP Unknown)   SpO2 95%   BMI 38 17 kg/m²          Physical Exam   Constitutional: She is oriented to person, place, and time  She appears well-developed and well-nourished  No distress  Cardiovascular: Normal rate, regular rhythm and normal heart sounds  Exam reveals no gallop and no friction rub  No murmur heard  Pulmonary/Chest: Effort normal and breath sounds normal  No respiratory distress  She has no wheezes  She has no rales  Abdominal: Normal appearance  There is tenderness in the right upper quadrant  There is no rigidity, no rebound, no guarding and no CVA tenderness  Musculoskeletal: She exhibits tenderness  She exhibits no edema  Right hip: She exhibits tenderness and bony tenderness  Lumbar back: She exhibits tenderness, bony tenderness and spasm  Bilateral knee braces   Neurological: She is alert and oriented to person, place, and time  Gait ( uses walker to assist in ambulation) abnormal    3/5 strength bilateral lower extremities  Skin: She is not diaphoretic  Nursing note and vitals reviewed

## 2018-07-26 NOTE — ASSESSMENT & PLAN NOTE
At this time will continue with Pepcid since it has been relieving symptoms  Strongly encourage patient that she should follow up with GI for further evaluation and also to discuss possible lactose intolerance  Would recommend getting EGD and colonoscopy completed

## 2018-07-26 NOTE — ASSESSMENT & PLAN NOTE
Lab Results   Component Value Date    HGBA1C 6 3 12/22/2017       No results for input(s): POCGLU in the last 72 hours  Blood Sugar Average: Last 72 hrs:   A1c today was 6 1%  At this time recommend continuing monitoring blood sugar, and using insulins as needed  Gave referral to [facility] for sight for diabetic eye exam   Discussed the importance of controlling diabetes with diet

## 2018-07-26 NOTE — ASSESSMENT & PLAN NOTE
Recommend following up with [facility] for further evaluation of cataplexy  With concerns of injury, and difficulty with ambulation will give referral to wheelchair clinic to be evaluated for wheelchair or scooter

## 2018-08-10 DIAGNOSIS — IMO0001 IDDM (INSULIN DEPENDENT DIABETES MELLITUS): Chronic | ICD-10-CM

## 2018-08-20 ENCOUNTER — OFFICE VISIT (OUTPATIENT)
Dept: FAMILY MEDICINE CLINIC | Facility: CLINIC | Age: 48
End: 2018-08-20
Payer: COMMERCIAL

## 2018-08-20 VITALS — HEART RATE: 88 BPM | SYSTOLIC BLOOD PRESSURE: 110 MMHG | TEMPERATURE: 97 F | DIASTOLIC BLOOD PRESSURE: 70 MMHG

## 2018-08-20 DIAGNOSIS — G89.4 CHRONIC PAIN SYNDROME: ICD-10-CM

## 2018-08-20 DIAGNOSIS — M31.30 WEGENER'S GRANULOMATOSIS: Chronic | ICD-10-CM

## 2018-08-20 DIAGNOSIS — F31.9 BIPOLAR 1 DISORDER (HCC): ICD-10-CM

## 2018-08-20 DIAGNOSIS — B07.0 PLANTAR WART, RIGHT FOOT: Primary | ICD-10-CM

## 2018-08-20 DIAGNOSIS — R10.13 EPIGASTRIC PAIN: ICD-10-CM

## 2018-08-20 PROCEDURE — 99212 OFFICE O/P EST SF 10 MIN: CPT | Performed by: STUDENT IN AN ORGANIZED HEALTH CARE EDUCATION/TRAINING PROGRAM

## 2018-08-20 RX ORDER — SALICYLIC ACID 260 MG/ML
LIQUID TOPICAL
Qty: 1 BOTTLE | Refills: 5 | Status: SHIPPED | OUTPATIENT
Start: 2018-08-20 | End: 2019-08-07 | Stop reason: ALTCHOICE

## 2018-08-20 NOTE — PROGRESS NOTES
Podiatry Clinic Visit  Ria Llamas 52 y o  female MRN: [id number]  Encounter: [id number]    Assessment/Plan     Assessment:  1  Right heel plantar verruca   2  DM type 2     Plan:  - Patient was seen/examined  All questions and concerns addressed  - Right lateral heel wart trimmed with #15 blade till patients tolerance with no pinpoint bleeding noted, cryotherapy was applied without incident    - Rx salicylic acid 99% solution   - RTC in 2 weeks      History of Present Illness     HPI:  Ria Llamas is a 52 y o  female who presents with wart on right lateral heel  Patient states she has had wart long time now  She states its so painful so she started trimming her self with a razor  She states she was not able to get dermatologist appointment yet to have it checked out by  The patient denies any nausea, vomiting, fever, chills, shortness of breath, or chest pains  Review of Systems   Constitutional: Negative  HENT: Negative  Eyes: Negative  Respiratory: Negative  Cardiovascular: Negative  Gastrointestinal: Negative  Musculoskeletal: Negative   Skin: Right foot wart   Neurological: Negative          Historical Information   Past Medical History:   Diagnosis Date    Anemia of chronic disease     Anxiety     Asthma     Asthma     Chronic abdominal pain     CKD (chronic kidney disease) stage 3, GFR 30-59 ml/min     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[address] )     Cushing syndrome ([address] )     Diabetes mellitus ([address] )     DVT (deep venous thrombosis) ([address] )     History of acute pancreatitis     felt secondary to Bactrim    HTN (hypertension)     Hyperlipidemia     Hypertension     Microscopic polyangiitis (HCC)     Morbid obesity ([address] )     MPA (microscopic polyangiitis) ([address] )     Ovarian cyst     Renal disorder     Self-inflicted injury     self inflicted skin wounds    Wegener's granulomatosis with renal involvement ([facility] [address] ) 2015     Past Surgical History:   Procedure Laterality Date    ESOPHAGOGASTRODUODENOSCOPY  09/11/2015    mild antral gastritis    RELEASE SCAR CONTRACTURE / GRAFT REPAIRS OF HAND       Social History   History   Alcohol Use No     History   Drug Use    Types: Marijuana     History   Smoking Status    Former Smoker    Types: Cigarettes    Quit date: 2010   Smokeless Tobacco    Never Used     Family History:   Family History   Problem Relation Age of Onset    Colon cancer Neg Hx     Drug abuse Neg Hx         mother father    Mental illness Neg Hx         disorder, mother father       Meds/Allergies     (Not in a hospital admission)  Allergies   Allergen Reactions    Bactrim [Sulfamethoxazole-Trimethoprim]      Pt "They think that is what cause the pancreatitis"     Haldol [Haloperidol] Other (See Comments)     "I don't like it"    Ibuprofen     Navane [Thiothixene]      SI    Other      "novaine?" antipsychotic    Prozac [Fluoxetine Hcl]      SI    Lexapro [Escitalopram Oxalate] Rash       Objective     Current Vitals:   Blood Pressure: 110/70 (08/20/18 1049)  Pulse: 88 (08/20/18 1049)  Temperature: (!) 97 °F (36 1 °C) (08/20/18 1049)  Temp Source: Tympanic (08/20/18 1049)        /70   Pulse 88   Temp (!) 97 °F (36 1 °C) (Tympanic)   LMP  (LMP Unknown)       Lower Extremity Exam:    Musculoskeletal:  MMT is 5/5 to all compartments of the LE, +0/4 edema B/L, Digital ROM is intact,      Pulses:   R DP is +2/4, R PT is +1/4, L DP is +2/4, L PT is +1/4, CFT< 3sec to all digits  Pedal hair is Present     Skin:  No open Lesions  Skin of the LE is normal texture, turgor  Right lateral heel wart that that embedded in hyperkeratosis  Very firm to touch  Neurologic:  Gross sensation is intact  Protective sensation is dimished

## 2018-08-21 RX ORDER — OXYCODONE AND ACETAMINOPHEN 7.5; 325 MG/1; MG/1
1 TABLET ORAL EVERY 6 HOURS PRN
Qty: 120 TABLET | Refills: 0 | Status: SHIPPED | OUTPATIENT
Start: 2018-08-21 | End: 2018-09-17 | Stop reason: SDUPTHER

## 2018-08-21 RX ORDER — DEXTROAMPHETAMINE SACCHARATE, AMPHETAMINE ASPARTATE MONOHYDRATE, DEXTROAMPHETAMINE SULFATE AND AMPHETAMINE SULFATE 5; 5; 5; 5 MG/1; MG/1; MG/1; MG/1
20 CAPSULE, EXTENDED RELEASE ORAL 2 TIMES DAILY
Qty: 60 CAPSULE | Refills: 0 | Status: SHIPPED | OUTPATIENT
Start: 2018-08-21 | End: 2018-09-17 | Stop reason: SDUPTHER

## 2018-08-21 RX ORDER — ONDANSETRON 4 MG/1
4 TABLET, ORALLY DISINTEGRATING ORAL EVERY 8 HOURS PRN
Qty: 30 TABLET | Refills: 0 | Status: SHIPPED | OUTPATIENT
Start: 2018-08-21 | End: 2019-04-24 | Stop reason: SDUPTHER

## 2018-08-29 ENCOUNTER — TELEPHONE (OUTPATIENT)
Dept: NEPHROLOGY | Facility: CLINIC | Age: 48
End: 2018-08-29

## 2018-08-29 NOTE — TELEPHONE ENCOUNTER
Pt is coming in next week to see Magui Broderick in [facility], she wants to know if she needs any labs done?

## 2018-08-29 NOTE — TELEPHONE ENCOUNTER
Yes a  CBC , Microalbuminuria / creatinine ratio , PTH intact , Renal function panel, Urinalysis   They are all in the chart she can go to a [facility] and get the blood work done

## 2018-09-04 ENCOUNTER — TELEPHONE (OUTPATIENT)
Dept: FAMILY MEDICINE CLINIC | Facility: CLINIC | Age: 48
End: 2018-09-04

## 2018-09-10 LAB
ALBUMIN SERPL-MCNC: 3.8 G/DL (ref 3.6–5.1)
ALBUMIN/CREAT UR: 395 MCG/MG CREAT
APPEARANCE UR: CLEAR
BACTERIA UR QL AUTO: ABNORMAL /HPF
BASOPHILS # BLD AUTO: 18 CELLS/UL (ref 0–200)
BASOPHILS NFR BLD AUTO: 0.3 %
BILIRUB UR QL STRIP: NEGATIVE
BUN SERPL-MCNC: 35 MG/DL (ref 7–25)
BUN/CREAT SERPL: 18 (CALC) (ref 6–22)
CALCIUM SERPL-MCNC: 9.2 MG/DL (ref 8.6–10.2)
CHLORIDE SERPL-SCNC: 106 MMOL/L (ref 98–110)
CO2 SERPL-SCNC: 25 MMOL/L (ref 20–32)
COLOR UR: YELLOW
CREAT SERPL-MCNC: 1.92 MG/DL (ref 0.5–1.1)
CREAT UR-MCNC: 101 MG/DL (ref 20–320)
EOSINOPHIL # BLD AUTO: 49 CELLS/UL (ref 15–500)
EOSINOPHIL NFR BLD AUTO: 0.8 %
ERYTHROCYTE [DISTWIDTH] IN BLOOD BY AUTOMATED COUNT: 14.4 % (ref 11–15)
GLUCOSE SERPL-MCNC: 126 MG/DL (ref 65–139)
GLUCOSE UR QL STRIP: NEGATIVE
HCT VFR BLD AUTO: 36.2 % (ref 35–45)
HGB BLD-MCNC: 12.2 G/DL (ref 11.7–15.5)
HGB UR QL STRIP: NEGATIVE
HYALINE CASTS #/AREA URNS LPF: ABNORMAL /LPF
KETONES UR QL STRIP: NEGATIVE
LEUKOCYTE ESTERASE UR QL STRIP: ABNORMAL
LYMPHOCYTES # BLD AUTO: 1482 CELLS/UL (ref 850–3900)
LYMPHOCYTES NFR BLD AUTO: 24.3 %
MCH RBC QN AUTO: 30.7 PG (ref 27–33)
MCHC RBC AUTO-ENTMCNC: 33.7 G/DL (ref 32–36)
MCV RBC AUTO: 91 FL (ref 80–100)
MICROALBUMIN UR-MCNC: 39.9 MG/DL
MONOCYTES # BLD AUTO: 586 CELLS/UL (ref 200–950)
MONOCYTES NFR BLD AUTO: 9.6 %
NEUTROPHILS # BLD AUTO: 3965 CELLS/UL (ref 1500–7800)
NEUTROPHILS NFR BLD AUTO: 65 %
NITRITE UR QL STRIP: NEGATIVE
PH UR STRIP: 5.5 [PH] (ref 5–8)
PHOSPHATE SERPL-MCNC: 4.2 MG/DL (ref 2.5–4.5)
PLATELET # BLD AUTO: 264 THOUSAND/UL (ref 140–400)
PMV BLD REES-ECKER: 10.8 FL (ref 7.5–12.5)
POTASSIUM SERPL-SCNC: 4.6 MMOL/L (ref 3.5–5.3)
PROT UR QL STRIP: ABNORMAL
RBC # BLD AUTO: 3.98 MILLION/UL (ref 3.8–5.1)
RBC #/AREA URNS HPF: ABNORMAL /HPF
SL AMB EGFR AFRICAN AMERICAN: 35 ML/MIN/1.73M2
SL AMB EGFR NON AFRICAN AMERICAN: 30 ML/MIN/1.73M2
SODIUM SERPL-SCNC: 138 MMOL/L (ref 135–146)
SP GR UR STRIP: 1.02 (ref 1–1.03)
SQUAMOUS #/AREA URNS HPF: ABNORMAL /HPF
WBC # BLD AUTO: 6.1 THOUSAND/UL (ref 3.8–10.8)
WBC #/AREA URNS HPF: ABNORMAL /HPF

## 2018-09-11 PROBLEM — R80.1 PERSISTENT PROTEINURIA: Status: ACTIVE | Noted: 2018-09-11

## 2018-09-11 NOTE — PROGRESS NOTES
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  Follows with Dr Michael Powell  Most recent creatinine 1 92 on 09/07  Previous creatinine was on 07/14 was 2 2  Her creatinine continues to improve  - Check PTH, RFP, UA prior to next appointment  3   Anemia of CKD:  Her hemoglobin remains stable  -Check CBC  4   Persistent proteinuria:  Last UA showed protein, micro creatinine ratio was 395  This has been quite stable for some time  She is currently not on an ACE-inhibitor due to her risk for recurrent acute kidney injuries   -Check UPCR      Patient will follow up in 6 months with Dr Michael Powell  HPI: Stephen Sigala is a 52 y o  female who is here for evaluation of CKD stage III, hypertension, anemia, and persistent proteinuria  She follows with Dr Michael Powell in the office  Her baseline creatinine is 1 7-2 0  Her chronic diseases secondary to previous vasculitis  She has had multiple visits to the emergency room for recurrent pancreatitis  Her most recent visit was in July of this year  She has previously on an ACE-inhibitor but this was placed on hold secondary to her risk for recurrent acute kidney injuries  The patient has been doing well  She has remained out of the hospital since July  Her blood sugars are controlled <150  She was taking her blood pressure at home daily and her SBP was around 120 consistently so she stopped checking it  She denies taking NSAIDs  Denies issues with urination  Her kidney function remains stable   She still has persistent protein in the urine  SHe is still experiencing pain in her left side  She was instructed to follow up with GI     ROS:   A complete review of systems was done  Pertinent positives and negatives as noted in the HPI, otherwise the review of systems is negative  Allergies: Bactrim [sulfamethoxazole-trimethoprim]; Haldol [haloperidol]; Ibuprofen; Navane [thiothixene];  Other; Prozac [fluoxetine hcl]; and Lexapro [escitalopram oxalate]    Medications:   Current Outpatient Prescriptions:     amLODIPine (NORVASC) 5 mg tablet, Take 1 tablet (5 mg total) by mouth daily, Disp: 90 tablet, Rfl: 2    amphetamine-dextroamphetamine (ADDERALL XR) 20 MG 24 hr capsule, Take 1 capsule (20 mg total) by mouth 2 (two) times a day Max Daily Amount: 40 mg, Disp: 60 capsule, Rfl: 0    cetirizine (ZyrTEC) 10 mg tablet, Take 1 tablet (10 mg total) by mouth daily, Disp: 90 tablet, Rfl: 0    cycloSPORINE (RESTASIS) 0 05 % ophthalmic emulsion, Administer 1 drop to both eyes 2 (two) times a day, Disp: , Rfl:     Elastic Bandages & Supports (THUMB SPLINT/LEFT MEDIUM) MISC, by Does not apply route daily, Disp: 1 each, Rfl: 0    famotidine (PEPCID) 20 mg tablet, Take 1 tablet (20 mg total) by mouth 2 (two) times a day, Disp: 180 tablet, Rfl: 1    fluticasone (FLONASE) 50 mcg/act nasal spray, 1 spray into each nostril daily, Disp: 16 g, Rfl: 0    gabapentin (NEURONTIN) 300 mg capsule, Take 1 capsule (300 mg total) by mouth 3 (three) times a day, Disp: 270 capsule, Rfl: 1    glucose blood (FREESTYLE LITE) test strip, 1 each by Other route 3 (three) times a day, Disp: 100 each, Rfl: 5    Humidifiers (COOL MIST HUMIDIFIER 1 GALLON) MISC, by Does not apply route as needed (shortness of breath), Disp: 1 each, Rfl: 0    insulin aspart (NovoLOG) 100 Units/mL injection pen, Inject 6 Units under the skin 3 (three) times a day with meals, Disp: 5 pen, Rfl: 1    insulin glargine (LANTUS SOLOSTAR) injection pen 100 units/mL, Inject 0 22 mL (22 Units total) under the skin daily at bedtime, Disp: 5 pen, Rfl: 1    Insulin Pen Needle (PEN NEEDLES) 31G X 8 MM MISC, Inject 1 Stick under the skin 4 (four) times a day (with meals and at bedtime), Disp: 100 each, Rfl: 6    Lancets (FREESTYLE) lancets, by Other route 3 (three) times a day, Disp: 100 each, Rfl: 11    lidocaine (XYLOCAINE) 5 % ointment, Apply topically 2 (two) times a day as needed for mild pain Please apply 5g twice daily as needed, Disp: 35 44 g, Rfl: 0    metoprolol succinate (TOPROL-XL) 25 mg 24 hr tablet, Take 1 tablet by mouth daily, Disp: , Rfl:     montelukast (SINGULAIR) 10 mg tablet, Take 1 tablet (10 mg total) by mouth daily at bedtime, Disp: 90 tablet, Rfl: 1    ondansetron (ZOFRAN) 4 mg tablet, Take 1 tablet (4 mg total) by mouth every 8 (eight) hours as needed for nausea or vomiting, Disp: 30 tablet, Rfl: 0    ondansetron (ZOFRAN-ODT) 4 mg disintegrating tablet, Take 1 tablet (4 mg total) by mouth every 8 (eight) hours as needed for nausea or vomiting, Disp: 30 tablet, Rfl: 0    oxyCODONE-acetaminophen (PERCOCET) 7 5-325 MG per tablet, Take 1 tablet by mouth every 6 (six) hours as needed (pain) Max Daily Amount: 4 tablets, Disp: 120 tablet, Rfl: 0    polyethylene glycol (GOLYTELY) 4000 mL solution, Take 4,000 mL by mouth once for 1 dose, Disp: 4000 mL, Rfl: 0    QUEtiapine (SEROquel) 100 mg tablet, Take 1 tablet (100 mg total) by mouth daily at bedtime, Disp: 30 tablet, Rfl: 2    Salicylic Acid 26 % SOLN, Apply twice daily to the wart , Disp: 1 Bottle, Rfl: 5    simethicone (MYLICON) 235 MG chewable tablet, Chew 1 tablet (125 mg total) every 6 (six) hours as needed for flatulence, Disp: 40 tablet, Rfl: 0    sucralfate (CARAFATE) 1 g/10 mL suspension, Take 10 mL (1 g total) by mouth 3 (three) times a day, Disp: 420 mL, Rfl: 2    TiZANidine (ZANAFLEX) 4 MG capsule, Take 1 capsule (4 mg total) by mouth 3 (three) times a day, Disp: 90 capsule, Rfl: 2    Past Medical History: Diagnosis Date    Anemia of chronic disease     Anxiety     Asthma     Asthma     Chronic abdominal pain     CKD (chronic kidney disease) stage 3, GFR 30-59 ml/min     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[place] [address] )     Cushing syndrome ([place] [address] )     Diabetes mellitus ([place] [address] )     DVT (deep venous thrombosis) ([place] [address] )     History of acute pancreatitis     felt secondary to Bactrim    HTN (hypertension)     Hyperlipidemia     Hypertension     Microscopic polyangiitis (HCC)     Morbid obesity (HCC)     MPA (microscopic polyangiitis) (HCC)     Ovarian cyst     Renal disorder     Self-inflicted injury     self inflicted skin wounds    Wegener's granulomatosis with renal involvement ([place] [address] ) 2015     Past Surgical History:   Procedure Laterality Date    ESOPHAGOGASTRODUODENOSCOPY  09/11/2015    mild antral gastritis    RELEASE SCAR CONTRACTURE / GRAFT REPAIRS OF HAND       Family History   Problem Relation Age of Onset    Colon cancer Neg Hx     Drug abuse Neg Hx         mother father    Mental illness Neg Hx         disorder, mother father      reports that she quit smoking about 8 years ago  Her smoking use included Cigarettes  She has never used smokeless tobacco  She reports that she uses drugs, including Marijuana  She reports that she does not drink alcohol  Physical Exam:   There were no vitals filed for this visit  There is no height or weight on file to calculate BMI      General: no acute distress   Eyes: conjunctivae pink, anicteric sclerae  ENT: mucous membranes moist  Neck: supple, no JVD  Chest: clear to auscultation bilaterally with no wheezes, rale or rhochi  CVS: regular rate and rhythm   Abdomen: soft, non-tender, non-distended  Extremities: no lower extremity edema   Skin: no rash  Neuro: awake and alert       Lab Results:  Results for orders placed or performed in visit on 09/07/18   Renal function panel   Result Value Ref Range    Glucose 126 65 - 139 mg/dL    BUN 35 (H) 7 - 25 mg/dL    Creatinine 1 92 (H) 0 50 - 1 10 mg/dL    eGFR Non  30 (L) > OR = 60 mL/min/1 73m2    SL AMB EGFR  35 (L) > OR = 60 mL/min/1 73m2    SL AMB BUN/CREATININE RATIO 18 6 - 22 (calc)    Sodium 138 135 - 146 mmol/L    SL AMB POTASSIUM 4 6 3 5 - 5 3 mmol/L    Chloride 106 98 - 110 mmol/L    CO2 25 20 - 32 mmol/L    SL AMB CALCIUM 9 2 8 6 - 10 2 mg/dL    Phosphorus, Serum 4 2 2 5 - 4 5 mg/dL    Albumin 3 8 3 6 - 5 1 g/dL   Microalbumin, Random Urine (W/Creatinine)   Result Value Ref Range    Creatinine, Urine 101 20 - 320 mg/dL    Microalbum  ,U,Random 39 9 See Note: mg/dL    Microalb/Creat Ratio 395 (H) <30 mcg/mg creat   Urinalysis with microscopic   Result Value Ref Range    Color UA YELLOW YELLOW    Urine Appearance CLEAR CLEAR    Specific Gravity 1 016 1 001 - 1 035    Ph 5 5 5 0 - 8 0    SL AMB GLUCOSE, URINE, QUAL  NEGATIVE NEGATIVE    SL AMB BILIRUBIN, URINE NEGATIVE NEGATIVE    SL AMB KETONE, URINE, QUAL  NEGATIVE NEGATIVE    SL AMB BLOOD, URINE NEGATIVE NEGATIVE    SL AMB PROTEIN, URINE, QUAL  2+ (A) NEGATIVE    SL AMB NITRITES URINE, QUAL   NEGATIVE NEGATIVE    SL AMB LEUKOCYTE ESTERASE URINE TRACE (A) NEGATIVE    SL AMB WBC, URINE 0-5 < OR = 5 /HPF    RBC, Urine NONE SEEN < OR = 2 /HPF    Squamous Epithelial Cells 0-5 < OR = 5 /HPF    Bacteria, UA NONE SEEN NONE SEEN /HPF    Hyaline Casts NONE SEEN NONE SEEN /LPF   CBC and differential   Result Value Ref Range    White Blood Cell Count 6 1 3 8 - 10 8 Thousand/uL    Red Blood Cell Count 3 98 3 80 - 5 10 Million/uL    Hemoglobin 12 2 11 7 - 15 5 g/dL    HCT 36 2 35 0 - 45 0 %    MCV 91 0 80 0 - 100 0 fL    MCH 30 7 27 0 - 33 0 pg    MCHC 33 7 32 0 - 36 0 g/dL    RDW 14 4 11 0 - 15 0 %    Platelet Count 850 748 - 400 Thousand/uL    SL AMB MPV 10 8 7 5 - 12 5 fL    Neutrophils (Absolute) 3,965 1,500 - 7,800 cells/uL    Lymphocytes (Absolute) 1,482 850 - 3,900 cells/uL    Monocytes (Absolute) 586 200 - 950 cells/uL    Eosinophils (Absolute) 49 15 - 500 cells/uL    Basophils (Absolute) 18 0 - 200 cells/uL    Neutrophils 65 %    Lymphocytes 24 3 %    Monocytes 9 6 %    Eosinophils 0 8 %    Basophils Relative 0 3 %         Results from last 7 days  Lab Units 09/07/18  1053   WBC  Thousand/uL 6 1   HEMOGLOBIN  g/dL 12 2   HEMATOCRIT  % 36 2   PLATELETS  Thousand/uL 264   CHLORIDE mmol/L 106   CO2 mmol/L 25   BUN mg/dL 35*   CREATININE mg/dL 1 92*   SL AMB CALCIUM mg/dL 9 2         Portions of the record may have been created with voice recognition software  Occasional wrong word or "sound a like" substitutions may have occurred due to the inherent limitations of voice recognition software  Read the chart carefully and recognize, using context, where substitutions have occurred  If you have any questions, please contact the dictating provider

## 2018-09-12 ENCOUNTER — OFFICE VISIT (OUTPATIENT)
Dept: NEPHROLOGY | Facility: CLINIC | Age: 48
End: 2018-09-12
Payer: COMMERCIAL

## 2018-09-12 VITALS
DIASTOLIC BLOOD PRESSURE: 80 MMHG | BODY MASS INDEX: 37.76 KG/M2 | SYSTOLIC BLOOD PRESSURE: 128 MMHG | RESPIRATION RATE: 16 BRPM | WEIGHT: 200 LBS | HEIGHT: 61 IN

## 2018-09-12 DIAGNOSIS — I15.1 HYPERTENSION SECONDARY TO OTHER RENAL DISORDERS: Primary | Chronic | ICD-10-CM

## 2018-09-12 DIAGNOSIS — N18.30 CKD (CHRONIC KIDNEY DISEASE) STAGE 3, GFR 30-59 ML/MIN (HCC): Chronic | ICD-10-CM

## 2018-09-12 DIAGNOSIS — N28.89 HYPERTENSION SECONDARY TO OTHER RENAL DISORDERS: Primary | Chronic | ICD-10-CM

## 2018-09-12 DIAGNOSIS — R80.1 PERSISTENT PROTEINURIA: ICD-10-CM

## 2018-09-12 DIAGNOSIS — D63.8 ANEMIA OF CHRONIC DISEASE: ICD-10-CM

## 2018-09-12 DIAGNOSIS — F31.9 BIPOLAR 1 DISORDER (HCC): ICD-10-CM

## 2018-09-12 PROCEDURE — 99214 OFFICE O/P EST MOD 30 MIN: CPT | Performed by: NURSE PRACTITIONER

## 2018-09-12 RX ORDER — QUETIAPINE FUMARATE 100 MG/1
100 TABLET, FILM COATED ORAL
Qty: 90 TABLET | Refills: 1 | Status: SHIPPED | OUTPATIENT
Start: 2018-09-12 | End: 2018-12-27 | Stop reason: SDUPTHER

## 2018-09-12 NOTE — PATIENT INSTRUCTIONS
Your kidney function remained stable  In your last blood work, your kidney function was within its baseline  We will have you check your blood work prior to the next appointment  Your hemoglobin remained stable  You still have protein in the urine  We discussed that you are not on an Ace inhibitor due to your frequent acute kidney injuries  We will check your urine prior to the next appointment  We discussed that your blood pressures have been stable at home  Your to continue to check them daily  If you start to notice an increase, please call the office  We discussed that your blood sugars have been well controlled  You do not need to see endocrinology at this time  Your last A1c was 6 1   UA instructed to follow up with GI for your left sided pain

## 2018-09-17 DIAGNOSIS — M31.30 WEGENER'S GRANULOMATOSIS: Chronic | ICD-10-CM

## 2018-09-17 DIAGNOSIS — F31.9 BIPOLAR 1 DISORDER (HCC): ICD-10-CM

## 2018-09-17 DIAGNOSIS — R11.0 NAUSEA: ICD-10-CM

## 2018-09-17 DIAGNOSIS — G89.4 CHRONIC PAIN SYNDROME: ICD-10-CM

## 2018-09-18 DIAGNOSIS — G89.4 CHRONIC PAIN SYNDROME: ICD-10-CM

## 2018-09-18 RX ORDER — ONDANSETRON 4 MG/1
4 TABLET, FILM COATED ORAL EVERY 8 HOURS PRN
Qty: 30 TABLET | Refills: 0 | Status: SHIPPED | OUTPATIENT
Start: 2018-09-18 | End: 2019-05-21

## 2018-09-18 RX ORDER — DEXTROAMPHETAMINE SACCHARATE, AMPHETAMINE ASPARTATE MONOHYDRATE, DEXTROAMPHETAMINE SULFATE AND AMPHETAMINE SULFATE 5; 5; 5; 5 MG/1; MG/1; MG/1; MG/1
20 CAPSULE, EXTENDED RELEASE ORAL 2 TIMES DAILY
Qty: 60 CAPSULE | Refills: 0 | Status: SHIPPED | OUTPATIENT
Start: 2018-09-18 | End: 2018-10-15 | Stop reason: SDUPTHER

## 2018-09-18 RX ORDER — OXYCODONE AND ACETAMINOPHEN 7.5; 325 MG/1; MG/1
1 TABLET ORAL EVERY 6 HOURS PRN
Qty: 120 TABLET | Refills: 0 | Status: SHIPPED | OUTPATIENT
Start: 2018-09-18 | End: 2018-10-15 | Stop reason: SDUPTHER

## 2018-09-18 RX ORDER — GABAPENTIN 300 MG/1
300 CAPSULE ORAL 3 TIMES DAILY
Qty: 270 CAPSULE | Refills: 1 | Status: SHIPPED | OUTPATIENT
Start: 2018-09-18 | End: 2019-02-05 | Stop reason: SDUPTHER

## 2018-09-24 ENCOUNTER — OFFICE VISIT (OUTPATIENT)
Dept: FAMILY MEDICINE CLINIC | Facility: CLINIC | Age: 48
End: 2018-09-24
Payer: COMMERCIAL

## 2018-09-24 VITALS
RESPIRATION RATE: 18 BRPM | TEMPERATURE: 96.2 F | HEART RATE: 80 BPM | DIASTOLIC BLOOD PRESSURE: 70 MMHG | SYSTOLIC BLOOD PRESSURE: 120 MMHG | BODY MASS INDEX: 39.84 KG/M2 | HEIGHT: 61 IN | WEIGHT: 211 LBS

## 2018-09-24 DIAGNOSIS — B07.0 VERRUCA PLANTARIS: Primary | ICD-10-CM

## 2018-09-24 PROCEDURE — 17110 DESTRUCTION B9 LES UP TO 14: CPT | Performed by: STUDENT IN AN ORGANIZED HEALTH CARE EDUCATION/TRAINING PROGRAM

## 2018-09-24 PROCEDURE — 99213 OFFICE O/P EST LOW 20 MIN: CPT | Performed by: STUDENT IN AN ORGANIZED HEALTH CARE EDUCATION/TRAINING PROGRAM

## 2018-09-24 NOTE — PROGRESS NOTES
Podiatry Clinic Visit  Meliton Ely 52 y o  female MRN: [id number]  Encounter: [id number]    Assessment/Plan     Assessment:  1  Verruca plantaris  - right lateral plantar foot    Plan:  - Patient was seen/examined  All questions and concerns addressed  - See procedure note  - Patient was referred to Dr Gorge Shrestha office due to failure of conservative treatment for months with cryotherapy      History of Present Illness     HPI:  Meliton Ely is a 52 y o  female who presents with right foot wart  Patient states she's had this for years and treatment has been going for the past 3-4 months and minimal improvement has been seen  Patient states she has been using OTC salicylic acid about 1-2 times a week  The patient denies any nausea, vomiting, fever, chills, shortness of breath, or chest pains  Review of Systems   Constitutional: Negative  HENT: Negative  Eyes: Negative  Respiratory: Negative  Cardiovascular: Negative  Gastrointestinal: Negative  Musculoskeletal: Negative   Skin: wart on right foot  Neurological: Negative          Historical Information   Past Medical History:   Diagnosis Date    Anemia of chronic disease     Anxiety     Asthma     Asthma     Chronic abdominal pain     CKD (chronic kidney disease) stage 3, GFR 30-59 ml/min     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 [address] [facility]     [facility]ushing syndrome [facility] [address] [facility]     Diabetes mellitus [facility] [address] [facility]     DVT (deep venous thrombosis) ([facility] [address] [facility]     History of acute pancreatitis     felt secondary to Bactrim    HTN (hypertension)     Hyperlipidemia     Hypertension     Microscopic polyangiitis (HCC)     Morbid obesity ([address] )     MPA (microscopic polyangiitis) ([facility] [address] [facility]     Ovarian cyst     Renal disorder     Self-inflicted injury     self inflicted skin wounds    Wegener's granulomatosis with renal involvement ([facility] [address] ) 2015     Past Surgical History:   Procedure Laterality Date    ESOPHAGOGASTRODUODENOSCOPY  09/11/2015    mild antral gastritis    RELEASE SCAR CONTRACTURE / GRAFT REPAIRS OF HAND       Social History   History   Alcohol Use No     History   Drug Use    Types: Marijuana     History   Smoking Status    Former Smoker    Types: Cigarettes    Quit date: 2010   Smokeless Tobacco    Never Used     Family History:   Family History   Problem Relation Age of Onset    Colon cancer Neg Hx     Drug abuse Neg Hx         mother father    Mental illness Neg Hx         disorder, mother father       Meds/Allergies     (Not in a hospital admission)  Allergies   Allergen Reactions    Amlodipine Hives    Bactrim [Sulfamethoxazole-Trimethoprim]      Pt "They think that is what cause the pancreatitis"     Haldol [Haloperidol] Other (See Comments)     "I don't like it"    Ibuprofen     Navane [Thiothixene]      SI    Other      "novaine?" antipsychotic    Prozac [Fluoxetine Hcl]      SI    Lexapro [Escitalopram Oxalate] Rash       Objective     Current Vitals:   Blood Pressure: 120/70 (09/24/18 0852)  Pulse: 80 (09/24/18 0852)  Temperature: (!) 96 2 °F (35 7 °C) (09/24/18 0852)  Temp Source: Tympanic (09/24/18 0852)  Respirations: 18 (09/24/18 0852)  Height: 5' 1" (154 9 cm) (09/24/18 0852)  Weight - Scale: 95 7 kg (211 lb) (09/24/18 0852)        /70   Pulse 80   Temp (!) 96 2 °F (35 7 °C) (Tympanic)   Resp 18   Ht 5' 1" (1 549 m)   Wt 95 7 kg (211 lb)   LMP  (LMP Unknown)   BMI 39 87 kg/m²       Lower Extremity Exam:    Musculoskeletal:  MMT is 5/5 to all compartments of the LE, +0/4 edema B/L, Digital ROM is intact,      Pulses:   R DP is +2/4, R PT is +2/4, L DP is +2/4, L PT is +2/4, CFT< 3sec to all digits  Pedal hair is Absent     Skin:  Wart on right plantar lateral foot, approximately 2cm in diameter  Skin of the LE is normal texture, turgor  Neurologic:  Gross sensation is intact   Protective sensation is Diminished

## 2018-09-24 NOTE — PROGRESS NOTES
Lesion Destruction  Date/Time: 9/24/2018 9:47 AM  Performed by: Sunshine Dash  Authorized by: Sunshine Dash     Procedure Details - Lesion Destruction:     Number of Lesions:  1  Lesion 1:     Body area:  Lower extremity    Lower extremity location:  R foot    Malignancy: benign lesion      Destruction method: cryotherapy    Lesion 6:      Wart was debrided sharply with 10 blade down to pinpoint bleeding prior to cryotherapy

## 2018-09-25 NOTE — TELEPHONE ENCOUNTER
Called and amlodipine, 5 mg daily  She is to call in few weeks with new blood pressure readings  muscle spasm

## 2018-10-01 ENCOUNTER — TELEPHONE (OUTPATIENT)
Dept: FAMILY MEDICINE CLINIC | Facility: CLINIC | Age: 48
End: 2018-10-01

## 2018-10-01 NOTE — TELEPHONE ENCOUNTER
Pt's [employer] called and said the scooter repairs have been denied because the cost of the repairs exceed the cost of a new scooter  If you would like to do a peer to peer the number is [phone]

## 2018-10-01 NOTE — TELEPHONE ENCOUNTER
[facility] [place] called to inform you that the Pt will not be able to receive the Scooter Repairs (costs exceeds the price of the scooter)  The Pt will need a new script to be re-evaluated for a NEW POWER SCOOTER REPLACEMENT  In addition, the [employer] gave the PT a Manual Wheelchair which is NOT covered by Medicare-[employer] because the Pt has already qualified for a power scooter  [place] received a script for a back up item - cushion for the wheelchair but this is also DENIED  It will go before peer review until tomorrow 10/2/18  If Dr Zamzam Reese  would like to call and provide information- call DR Joann Tran at [phone]

## 2018-10-03 ENCOUNTER — HOSPITAL ENCOUNTER (OUTPATIENT)
Dept: NEUROLOGY | Facility: AMBULATORY SURGERY CENTER | Age: 48
Discharge: HOME | End: 2018-10-03
Payer: COMMERCIAL

## 2018-10-03 DIAGNOSIS — G89.4 CHRONIC PAIN SYNDROME: ICD-10-CM

## 2018-10-03 DIAGNOSIS — G47.411 CATAPLEXY: Primary | ICD-10-CM

## 2018-10-03 DIAGNOSIS — R29.898 WEAKNESS OF BOTH UPPER EXTREMITIES: ICD-10-CM

## 2018-10-03 DIAGNOSIS — R29.898 WEAKNESS OF BOTH LOWER EXTREMITIES: ICD-10-CM

## 2018-10-03 PROCEDURE — 95886 MUSC TEST DONE W/N TEST COMP: CPT | Performed by: PSYCHIATRY & NEUROLOGY

## 2018-10-03 PROCEDURE — 95912 NRV CNDJ TEST 11-12 STUDIES: CPT | Performed by: PSYCHIATRY & NEUROLOGY

## 2018-10-03 PROCEDURE — 95886 MUSC TEST DONE W/N TEST COMP: CPT

## 2018-10-03 PROCEDURE — 95912 NRV CNDJ TEST 11-12 STUDIES: CPT

## 2018-10-03 NOTE — TELEPHONE ENCOUNTER
I put in a new [facility]

## 2018-10-04 ENCOUNTER — TELEPHONE (OUTPATIENT)
Dept: FAMILY MEDICINE CLINIC | Facility: CLINIC | Age: 48
End: 2018-10-04

## 2018-10-07 ENCOUNTER — HOSPITAL ENCOUNTER (EMERGENCY)
Facility: HOSPITAL | Age: 48
Discharge: HOME | End: 2018-10-07
Attending: EMERGENCY MEDICINE | Admitting: EMERGENCY MEDICINE
Payer: COMMERCIAL

## 2018-10-07 VITALS
BODY MASS INDEX: 37.79 KG/M2 | OXYGEN SATURATION: 95 % | TEMPERATURE: 97.5 F | DIASTOLIC BLOOD PRESSURE: 88 MMHG | HEART RATE: 80 BPM | SYSTOLIC BLOOD PRESSURE: 180 MMHG | RESPIRATION RATE: 18 BRPM | WEIGHT: 200 LBS

## 2018-10-07 DIAGNOSIS — K52.9 ACUTE GASTROENTERITIS: Primary | ICD-10-CM

## 2018-10-07 LAB
ALBUMIN SERPL BCP-MCNC: 3.6 G/DL (ref 3.5–5)
ALP SERPL-CCNC: 136 U/L (ref 46–116)
ALT SERPL W P-5'-P-CCNC: 28 U/L (ref 12–78)
ANION GAP SERPL CALCULATED.3IONS-SCNC: 11 MMOL/L (ref 4–13)
AST SERPL W P-5'-P-CCNC: 22 U/L (ref 5–45)
BACTERIA UR QL AUTO: ABNORMAL /HPF
BASOPHILS # BLD AUTO: 0.03 THOUSANDS/ΜL (ref 0–0.1)
BASOPHILS NFR BLD AUTO: 0 % (ref 0–1)
BILIRUB SERPL-MCNC: 0.14 MG/DL (ref 0.2–1)
BILIRUB UR QL STRIP: NEGATIVE
BUN SERPL-MCNC: 38 MG/DL (ref 5–25)
CALCIUM SERPL-MCNC: 9.7 MG/DL (ref 8.3–10.1)
CHLORIDE SERPL-SCNC: 103 MMOL/L (ref 100–108)
CLARITY UR: CLEAR
CO2 SERPL-SCNC: 24 MMOL/L (ref 21–32)
COLOR UR: YELLOW
COLOR, POC: YELLOW
CREAT SERPL-MCNC: 2.16 MG/DL (ref 0.6–1.3)
EOSINOPHIL # BLD AUTO: 0.02 THOUSAND/ΜL (ref 0–0.61)
EOSINOPHIL NFR BLD AUTO: 0 % (ref 0–6)
ERYTHROCYTE [DISTWIDTH] IN BLOOD BY AUTOMATED COUNT: 13.6 % (ref 11.6–15.1)
GFR SERPL CREATININE-BSD FRML MDRD: 27 ML/MIN/1.73SQ M
GLUCOSE SERPL-MCNC: 150 MG/DL (ref 65–140)
GLUCOSE UR STRIP-MCNC: NEGATIVE MG/DL
HCT VFR BLD AUTO: 41.6 % (ref 34.8–46.1)
HGB BLD-MCNC: 13.7 G/DL (ref 11.5–15.4)
HGB UR QL STRIP.AUTO: ABNORMAL
IMM GRANULOCYTES # BLD AUTO: 0.06 THOUSAND/UL (ref 0–0.2)
IMM GRANULOCYTES NFR BLD AUTO: 0 % (ref 0–2)
KETONES UR STRIP-MCNC: NEGATIVE MG/DL
LEUKOCYTE ESTERASE UR QL STRIP: NEGATIVE
LIPASE SERPL-CCNC: 429 U/L (ref 73–393)
LYMPHOCYTES # BLD AUTO: 1.14 THOUSANDS/ΜL (ref 0.6–4.47)
LYMPHOCYTES NFR BLD AUTO: 8 % (ref 14–44)
MCH RBC QN AUTO: 30.8 PG (ref 26.8–34.3)
MCHC RBC AUTO-ENTMCNC: 32.9 G/DL (ref 31.4–37.4)
MCV RBC AUTO: 94 FL (ref 82–98)
MONOCYTES # BLD AUTO: 0.79 THOUSAND/ΜL (ref 0.17–1.22)
MONOCYTES NFR BLD AUTO: 6 % (ref 4–12)
NEUTROPHILS # BLD AUTO: 11.52 THOUSANDS/ΜL (ref 1.85–7.62)
NEUTS SEG NFR BLD AUTO: 86 % (ref 43–75)
NITRITE UR QL STRIP: NEGATIVE
NON-SQ EPI CELLS URNS QL MICRO: ABNORMAL /HPF
NRBC BLD AUTO-RTO: 0 /100 WBCS
PH UR STRIP.AUTO: 7 [PH] (ref 4.5–8)
PLATELET # BLD AUTO: 300 THOUSANDS/UL (ref 149–390)
PMV BLD AUTO: 10.3 FL (ref 8.9–12.7)
POTASSIUM SERPL-SCNC: 4 MMOL/L (ref 3.5–5.3)
PROT SERPL-MCNC: 8.4 G/DL (ref 6.4–8.2)
PROT UR STRIP-MCNC: >=300 MG/DL
RBC # BLD AUTO: 4.45 MILLION/UL (ref 3.81–5.12)
RBC #/AREA URNS AUTO: ABNORMAL /HPF
SODIUM SERPL-SCNC: 138 MMOL/L (ref 136–145)
SP GR UR STRIP.AUTO: 1.02 (ref 1–1.03)
UROBILINOGEN UR QL STRIP.AUTO: 0.2 E.U./DL
WBC # BLD AUTO: 13.56 THOUSAND/UL (ref 4.31–10.16)
WBC #/AREA URNS AUTO: ABNORMAL /HPF

## 2018-10-07 PROCEDURE — 81001 URINALYSIS AUTO W/SCOPE: CPT

## 2018-10-07 PROCEDURE — 83690 ASSAY OF LIPASE: CPT | Performed by: EMERGENCY MEDICINE

## 2018-10-07 PROCEDURE — 85025 COMPLETE CBC W/AUTO DIFF WBC: CPT | Performed by: EMERGENCY MEDICINE

## 2018-10-07 PROCEDURE — 80053 COMPREHEN METABOLIC PANEL: CPT | Performed by: EMERGENCY MEDICINE

## 2018-10-07 PROCEDURE — 96361 HYDRATE IV INFUSION ADD-ON: CPT

## 2018-10-07 PROCEDURE — 96374 THER/PROPH/DIAG INJ IV PUSH: CPT

## 2018-10-07 PROCEDURE — 96376 TX/PRO/DX INJ SAME DRUG ADON: CPT

## 2018-10-07 PROCEDURE — 99284 EMERGENCY DEPT VISIT MOD MDM: CPT

## 2018-10-07 PROCEDURE — 36415 COLL VENOUS BLD VENIPUNCTURE: CPT | Performed by: EMERGENCY MEDICINE

## 2018-10-07 RX ORDER — ONDANSETRON 2 MG/ML
4 INJECTION INTRAMUSCULAR; INTRAVENOUS ONCE
Status: COMPLETED | OUTPATIENT
Start: 2018-10-07 | End: 2018-10-07

## 2018-10-07 RX ORDER — ONDANSETRON 4 MG/1
4 TABLET, ORALLY DISINTEGRATING ORAL EVERY 8 HOURS PRN
Qty: 10 TABLET | Refills: 0 | Status: SHIPPED | OUTPATIENT
Start: 2018-10-07 | End: 2018-10-15 | Stop reason: SDUPTHER

## 2018-10-07 RX ORDER — DICYCLOMINE HCL 20 MG
20 TABLET ORAL ONCE
Status: COMPLETED | OUTPATIENT
Start: 2018-10-07 | End: 2018-10-07

## 2018-10-07 RX ORDER — DICYCLOMINE HCL 20 MG
20 TABLET ORAL EVERY 6 HOURS PRN
Qty: 10 TABLET | Refills: 0 | Status: SHIPPED | OUTPATIENT
Start: 2018-10-07 | End: 2020-04-22

## 2018-10-07 RX ADMIN — ONDANSETRON 4 MG: 2 INJECTION INTRAMUSCULAR; INTRAVENOUS at 07:44

## 2018-10-07 RX ADMIN — SODIUM CHLORIDE 1000 ML: 0.9 INJECTION, SOLUTION INTRAVENOUS at 06:12

## 2018-10-07 RX ADMIN — DICYCLOMINE HYDROCHLORIDE 20 MG: 20 TABLET ORAL at 06:58

## 2018-10-07 RX ADMIN — ONDANSETRON 4 MG: 2 INJECTION INTRAMUSCULAR; INTRAVENOUS at 06:10

## 2018-10-07 NOTE — ED PROVIDER NOTES
History  Chief Complaint   Patient presents with    Abdominal Pain     Arrives via EMS, reports abdominal pain, nausea, and vomiting since 0200 this morning  59-year-old female with a history of diabetes, hypertension, bipolar depression, Justa's granulomatosis presents to the emergency department with nausea, vomiting and diarrhea along with epigastric abdominal pain since 2 this morning  Patient states she has had multiple episodes of nonbilious, nonbloody vomiting and diarrhea  She cannot describe the epigastric pain  She has had no fevers or chills  No known ill contacts, bad food exposure or recent travel          History provided by:  Patient   used: No    Abdominal Pain   Pain location:  Epigastric  Pain quality comment:  Unable to describe  Pain radiates to:  Does not radiate  Pain severity:  Unable to specify  Onset quality:  Gradual  Duration:  4 hours  Timing:  Constant  Progression:  Unchanged  Chronicity:  New  Context: awakening from sleep    Context: not alcohol use, not previous surgeries, not recent illness, not sick contacts, not suspicious food intake and not trauma    Relieved by:  None tried  Worsened by:  Nothing  Ineffective treatments:  None tried  Associated symptoms: chills, diarrhea, nausea and vomiting    Associated symptoms: no anorexia, no belching, no chest pain, no constipation, no cough, no dysuria, no fatigue, no fever, no flatus, no hematemesis, no hematochezia, no hematuria, no melena, no shortness of breath and no sore throat    Diarrhea:     Quality:  Watery    Number of occurrences:  Too many    Duration:  4 hours    Progression:  Unchanged  Vomiting:     Quality:  Stomach contents    Number of occurrences:  Too many    Severity:  Unable to specify    Duration:  4 hours    Timing:  Intermittent    Progression:  Unchanged  Risk factors: obesity    Risk factors: no alcohol abuse, has not had multiple surgeries, no NSAID use and not pregnant Prior to Admission Medications   Prescriptions Last Dose Informant Patient Reported? Taking? Elastic Bandages & Supports (THUMB SPLINT/LEFT MEDIUM) MISC   No No   Sig: by Does not apply route daily   Humidifiers (COOL MIST HUMIDIFIER 1 GALLON) MISC  Self No No   Sig: by Does not apply route as needed (shortness of breath)   Insulin Pen Needle (PEN NEEDLES) 31G X 8 MM MISC   No No   Sig: Inject 1 Stick under the skin 4 (four) times a day (with meals and at bedtime)   Lancets (FREESTYLE) lancets   No No   Sig: by Other route 3 (three) times a day   QUEtiapine (SEROquel) 100 mg tablet   No No   Sig: Take 1 tablet (100 mg total) by mouth daily at bedtime   Salicylic Acid 26 % SOLN   No No   Sig: Apply twice daily to the wart     Patient not taking: Reported on 2018    TiZANidine (ZANAFLEX) 4 MG capsule   No No   Sig: Take 1 capsule (4 mg total) by mouth 3 (three) times a day   amLODIPine (NORVASC) 5 mg tablet   No No   Sig: Take 1 tablet (5 mg total) by mouth daily   amphetamine-dextroamphetamine (ADDERALL XR) 20 MG 24 hr capsule   No No   Sig: Take 1 capsule (20 mg total) by mouth 2 (two) times a day Max Daily Amount: 40 mg   cetirizine (ZyrTEC) 10 mg tablet   No No   Sig: Take 1 tablet (10 mg total) by mouth daily   cycloSPORINE (RESTASIS) 0 05 % ophthalmic emulsion  Self Yes No   Sig: Administer 1 drop to both eyes 2 (two) times a day   famotidine (PEPCID) 20 mg tablet   No No   Sig: Take 1 tablet (20 mg total) by mouth 2 (two) times a day   fluticasone (FLONASE) 50 mcg/act nasal spray   No No   Si spray into each nostril daily   Patient not taking: Reported on 2018    gabapentin (NEURONTIN) 300 mg capsule   No No   Sig: Take 1 capsule (300 mg total) by mouth 3 (three) times a day   glucose blood (FREESTYLE LITE) test strip   No No   Si each by Other route 3 (three) times a day   insulin aspart (NovoLOG) 100 Units/mL injection pen   No No   Sig: Inject 6 Units under the skin 3 (three) times a day with meals   insulin glargine (LANTUS SOLOSTAR) injection pen 100 units/mL  Self No No   Sig: Inject 0 22 mL (22 Units total) under the skin daily at bedtime   Patient not taking: Reported on 9/12/2018    lidocaine (XYLOCAINE) 5 % ointment   No No   Sig: Apply topically 2 (two) times a day as needed for mild pain Please apply 5g twice daily as needed   Patient not taking: Reported on 9/12/2018    metoprolol succinate (TOPROL-XL) 25 mg 24 hr tablet  Self Yes No   Sig: Take 1 tablet by mouth daily   montelukast (SINGULAIR) 10 mg tablet   No No   Sig: Take 1 tablet (10 mg total) by mouth daily at bedtime   ondansetron (ZOFRAN) 4 mg tablet   No No   Sig: Take 1 tablet (4 mg total) by mouth every 8 (eight) hours as needed for nausea or vomiting   ondansetron (ZOFRAN-ODT) 4 mg disintegrating tablet   No No   Sig: Take 1 tablet (4 mg total) by mouth every 8 (eight) hours as needed for nausea or vomiting   oxyCODONE-acetaminophen (PERCOCET) 7 5-325 MG per tablet   No No   Sig: Take 1 tablet by mouth every 6 (six) hours as needed (pain) Max Daily Amount: 4 tablets   polyethylene glycol (GOLYTELY) 4000 mL solution   No No   Sig: Take 4,000 mL by mouth once for 1 dose   simethicone (MYLICON) 486 MG chewable tablet   No No   Sig: Chew 1 tablet (125 mg total) every 6 (six) hours as needed for flatulence   sucralfate (CARAFATE) 1 g/10 mL suspension   No No   Sig: Take 10 mL (1 g total) by mouth 3 (three) times a day      Facility-Administered Medications: None       Past Medical History:   Diagnosis Date    Anemia of chronic disease     Anxiety     Asthma     Asthma     Chronic abdominal pain     CKD (chronic kidney disease) stage 3, GFR 30-59 ml/min ([facility])     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     [facility] syndrome [facility]     [facility]     DVT (deep venous thrombosis) ([facility])     History of acute pancreatitis     felt secondary to Bactrim    HTN (hypertension)     Hyperlipidemia     Hypertension     [facility]     Morbid obesity [facility]     MPA (microscopic polyangiitis) ([facility])     Ovarian cyst     Renal disorder     Self-inflicted injury     self inflicted skin wounds    Wegener's granulomatosis with renal involvement ([facility] ) 2015       Past Surgical History:   Procedure Laterality Date    ESOPHAGOGASTRODUODENOSCOPY  09/11/2015    mild antral gastritis    RELEASE SCAR CONTRACTURE / GRAFT REPAIRS OF HAND         Family History   Problem Relation Age of Onset    Colon cancer Neg Hx     Drug abuse Neg Hx         mother father    Mental illness Neg Hx         disorder, mother father     I have reviewed and agree with the history as documented  Social History   Substance Use Topics    Smoking status: Former Smoker     Types: Cigarettes     Quit date: 2010    Smokeless tobacco: Never Used    Alcohol use No        Review of Systems   Constitutional: Positive for chills  Negative for diaphoresis, fatigue and fever  HENT: Negative  Negative for congestion, rhinorrhea and sore throat  Eyes: Negative  Negative for discharge, redness and itching  Respiratory: Negative  Negative for apnea, cough, chest tightness, shortness of breath and wheezing  Cardiovascular: Negative for chest pain, palpitations and leg swelling  Gastrointestinal: Positive for abdominal pain, diarrhea, nausea and vomiting  Negative for abdominal distention, anorexia, blood in stool, constipation, flatus, hematemesis, hematochezia and melena  Endocrine: Negative  Genitourinary: Negative  Negative for dysuria, flank pain, frequency, hematuria and urgency  Musculoskeletal: Negative  Negative for back pain  Skin: Negative  Allergic/Immunologic: Negative  Neurological: Negative  Negative for dizziness, syncope, weakness, light-headedness, numbness and headaches  Hematological: Negative  All other systems reviewed and are negative        Physical Exam  Physical Exam   Constitutional: She is oriented to person, place, and time  She appears well-developed and well-nourished  Non-toxic appearance  She does not have a sickly appearance  She does not appear ill  She appears distressed (Patient rolling around on bed)  HENT:   Head: Normocephalic and atraumatic  Right Ear: External ear normal    Left Ear: External ear normal    Mouth/Throat: Oropharynx is clear and moist    Eyes: Pupils are equal, round, and reactive to light  Conjunctivae are normal  Right eye exhibits no discharge  Left eye exhibits no discharge  No scleral icterus  Cardiovascular: Normal rate, regular rhythm and normal heart sounds  Exam reveals no gallop and no friction rub  No murmur heard  Pulmonary/Chest: Effort normal and breath sounds normal  No respiratory distress  She has no wheezes  She has no rales  She exhibits no tenderness  Abdominal: Soft  Normal appearance and bowel sounds are normal  She exhibits no distension and no mass  There is tenderness in the epigastric area  There is no rebound and no guarding  No hernia  Obese   Neurological: She is alert and oriented to person, place, and time  She has normal reflexes  She exhibits normal muscle tone  Skin: Skin is warm and dry  No rash noted  She is not diaphoretic  No erythema  No pallor  Psychiatric: She has a normal mood and affect  Nursing note and vitals reviewed        Vital Signs  ED Triage Vitals   Temperature Pulse Respirations Blood Pressure SpO2   10/07/18 0533 10/07/18 0530 10/07/18 0530 10/07/18 0530 10/07/18 0530   97 5 °F (36 4 °C) 83 18 (!) 178/116 98 %      Temp Source Heart Rate Source Patient Position - Orthostatic VS BP Location FiO2 (%)   10/07/18 0533 10/07/18 0530 10/07/18 0530 10/07/18 0530 --   Temporal Monitor Sitting Left arm       Pain Score       10/07/18 0530       9           Vitals:    10/07/18 0530 10/07/18 0742   BP: (!) 178/116 (!) 180/88   Pulse: 83 80   Patient Position - Orthostatic VS: Sitting        Visual Acuity      ED Medications  Medications   sodium chloride 0 9 % bolus 1,000 mL (0 mL Intravenous Stopped 10/7/18 0800)   ondansetron (ZOFRAN) injection 4 mg (4 mg Intravenous Given 10/7/18 0610)   dicyclomine (BENTYL) tablet 20 mg (20 mg Oral Given 10/7/18 0658)   ondansetron (ZOFRAN) injection 4 mg (4 mg Intravenous Given 10/7/18 0744)       Diagnostic Studies  Results Reviewed     Procedure Component Value Units Date/Time    Urine Microscopic [[id number]]  (Abnormal) Collected:  10/07/18 0717    Lab Status:  Final result Specimen:  Urine from Urine, Clean Catch Updated:  10/07/18 0733     RBC, UA 2-4 (A) /hpf      WBC, UA 0-1 (A) /hpf      Epithelial Cells Occasional /hpf      Bacteria, UA None Seen /hpf     POCT urinalysis dipstick [[id number]]  (Abnormal) Resulted:  10/07/18 0708    Lab Status:  Final result Specimen:  Urine Updated:  10/07/18 0708     Color, UA yellow    ED Urine Macroscopic [[id number]]  (Abnormal) Collected:  10/07/18 0717    Lab Status:  Final result Specimen:  Urine Updated:  10/07/18 0706     Color, UA Yellow     Clarity, UA Clear     pH, UA 7 0     Leukocytes, UA Negative     Nitrite, UA Negative     Protein, UA >=300 (A) mg/dl      Glucose, UA Negative mg/dl      Ketones, UA Negative mg/dl      Urobilinogen, UA 0 2 E U /dl      Bilirubin, UA Negative     Blood, UA Small (A)     Specific Coleman, UA 1 025    Narrative:       CLINITEK RESULT    Comprehensive metabolic panel [[id number]]  (Abnormal) Collected:  10/07/18 0610    Lab Status:  Final result Specimen:  Blood from Arm, Right Updated:  10/07/18 0643     Sodium 138 mmol/L      Potassium 4 0 mmol/L      Chloride 103 mmol/L      CO2 24 mmol/L      ANION GAP 11 mmol/L      BUN 38 (H) mg/dL      Creatinine 2 16 (H) mg/dL      Glucose 150 (H) mg/dL      Calcium 9 7 mg/dL      AST 22 U/L      ALT 28 U/L      Alkaline Phosphatase 136 (H) U/L      Total Protein 8 4 (H) g/dL      Albumin 3 6 g/dL      Total Bilirubin 0 14 (L) mg/dL      eGFR 27 ml/min/1 73sq m Narrative:         National Kidney Disease Education Program recommendations are as follows:  GFR calculation is accurate only with a steady state creatinine  Chronic Kidney disease less than 60 ml/min/1 73 sq  meters  Kidney failure less than 15 ml/min/1 73 sq  meters  Lipase [[id number]]  (Abnormal) Collected:  10/07/18 0610    Lab Status:  Final result Specimen:  Blood from Arm, Right Updated:  10/07/18 0643     Lipase 429 (H) u/L     CBC and differential [[id number]]  (Abnormal) Collected:  10/07/18 0610    Lab Status:  Final result Specimen:  Blood from Arm, Right Updated:  10/07/18 0624     WBC 13 56 (H) Thousand/uL      RBC 4 45 Million/uL      Hemoglobin 13 7 g/dL      Hematocrit 41 6 %      MCV 94 fL      MCH 30 8 pg      MCHC 32 9 g/dL      RDW 13 6 %      MPV 10 3 fL      Platelets 484 Thousands/uL      nRBC 0 /100 WBCs      Neutrophils Relative 86 (H) %      Immat GRANS % 0 %      Lymphocytes Relative 8 (L) %      Monocytes Relative 6 %      Eosinophils Relative 0 %      Basophils Relative 0 %      Neutrophils Absolute 11 52 (H) Thousands/µL      Immature Grans Absolute 0 06 Thousand/uL      Lymphocytes Absolute 1 14 Thousands/µL      Monocytes Absolute 0 79 Thousand/µL      Eosinophils Absolute 0 02 Thousand/µL      Basophils Absolute 0 03 Thousands/µL                  No orders to display              Procedures  Procedures       Phone Contacts  ED Phone Contact    ED Course  ED Course as of Oct 09 0900   Sun Oct 07, 2018   3440 Patient much more comfortable  Resting    0646 Baseline Creatinine: (!) 2 16                               MDM  Number of Diagnoses or Management Options  Acute gastroenteritis:   Diagnosis management comments: 15-year-old female presents with nausea, vomiting, diarrhea and abdominal pain since about 2 this morning  On exam she appears uncomfortable and is rolling around making it difficult to examine her    When she is able to lie on her back her abdomen is obese, soft and nondistended  She does have mild epigastric tenderness without guarding or rebound  She has had multiple negative CTs in the last year  Will treat as gastroenteritis with IV fluids, Zofran and Bentyl and reassess       Amount and/or Complexity of Data Reviewed  Clinical lab tests: ordered and reviewed      CritCare Time    Disposition  Final diagnoses:   Acute gastroenteritis     Time reflects when diagnosis was documented in both MDM as applicable and the Disposition within this note     Time User Action Codes Description Comment    10/7/2018  6:48 AM Isabel ARCHULETA Add [K52 9] Acute gastroenteritis       ED Disposition     ED Disposition Condition Comment    Discharge  Mayra Negro discharge to home/self care  Condition at discharge: Stable        Follow-up Information     Follow up With Specialties Details Why Contact Info    Rex Garber PA-C Family Medicine, Physician Assistant Schedule an appointment as soon as possible for a visit in 2 days If symptoms worsen or return to the emergency department [facility] [address] [place] [address],[address] [place] [facility] [address]  [phone]            Discharge Medication List as of 10/7/2018  6:49 AM      START taking these medications    Details   dicyclomine (BENTYL) 20 mg tablet Take 1 tablet (20 mg total) by mouth every 6 (six) hours as needed (pain), Starting Sun 10/7/2018, Print      !! ondansetron (ZOFRAN-ODT) 4 mg disintegrating tablet Take 1 tablet (4 mg total) by mouth every 8 (eight) hours as needed for nausea or vomiting, Starting Sun 10/7/2018, Print       !! - Potential duplicate medications found  Please discuss with provider        CONTINUE these medications which have NOT CHANGED    Details   amLODIPine (NORVASC) 5 mg tablet Take 1 tablet (5 mg total) by mouth daily, Starting Tue 5/29/2018, Normal      amphetamine-dextroamphetamine (ADDERALL XR) 20 MG 24 hr capsule Take 1 capsule (20 mg total) by mouth 2 (two) times a day Max Daily Amount: 40 mg, Starting Tue 9/18/2018, Normal      cetirizine (ZyrTEC) 10 mg tablet Take 1 tablet (10 mg total) by mouth daily, Starting Thu 4/26/2018, Normal      cycloSPORINE (RESTASIS) 0 05 % ophthalmic emulsion Administer 1 drop to both eyes 2 (two) times a day, Historical Med      Elastic Bandages & Supports (THUMB SPLINT/LEFT MEDIUM) MISC by Does not apply route daily, Starting Thu 4/26/2018, Print      famotidine (PEPCID) 20 mg tablet Take 1 tablet (20 mg total) by mouth 2 (two) times a day, Starting Thu 7/26/2018, Normal      fluticasone (FLONASE) 50 mcg/act nasal spray 1 spray into each nostril daily, Starting Tue 5/29/2018, Normal      gabapentin (NEURONTIN) 300 mg capsule Take 1 capsule (300 mg total) by mouth 3 (three) times a day, Starting Tue 9/18/2018, Normal      glucose blood (FREESTYLE LITE) test strip 1 each by Other route 3 (three) times a day, Starting Mon 8/13/2018, Normal      Humidifiers (COOL MIST HUMIDIFIER 1 GALLON) MISC by Does not apply route as needed (shortness of breath), Starting Tue 2/27/2018, Print      insulin aspart (NovoLOG) 100 Units/mL injection pen Inject 6 Units under the skin 3 (three) times a day with meals, Starting Mon 7/9/2018, Normal      insulin glargine (LANTUS SOLOSTAR) injection pen 100 units/mL Inject 0 22 mL (22 Units total) under the skin daily at bedtime, Starting Tue 3/13/2018, Normal      Insulin Pen Needle (PEN NEEDLES) 31G X 8 MM MISC Inject 1 Stick under the skin 4 (four) times a day (with meals and at bedtime), Starting Wed 3/28/2018, Normal      Lancets (FREESTYLE) lancets by Other route 3 (three) times a day, Starting Thu 4/5/2018, Normal      lidocaine (XYLOCAINE) 5 % ointment Apply topically 2 (two) times a day as needed for mild pain Please apply 5g twice daily as needed, Starting Wed 3/28/2018, Normal      metoprolol succinate (TOPROL-XL) 25 mg 24 hr tablet Take 1 tablet by mouth daily, Starting Wed 11/4/2015, Historical Med      montelukast (SINGULAIR) 10 mg tablet Take 1 tablet (10 mg total) by mouth daily at bedtime, Starting Tue 5/29/2018, Normal      ondansetron (ZOFRAN) 4 mg tablet Take 1 tablet (4 mg total) by mouth every 8 (eight) hours as needed for nausea or vomiting, Starting Tue 9/18/2018, Normal      !! ondansetron (ZOFRAN-ODT) 4 mg disintegrating tablet Take 1 tablet (4 mg total) by mouth every 8 (eight) hours as needed for nausea or vomiting, Starting Tue 8/21/2018, Normal      oxyCODONE-acetaminophen (PERCOCET) 7 5-325 MG per tablet Take 1 tablet by mouth every 6 (six) hours as needed (pain) Max Daily Amount: 4 tablets, Starting Tue 9/18/2018, Normal      polyethylene glycol (GOLYTELY) 4000 mL solution Take 4,000 mL by mouth once for 1 dose, Starting Wed 3/28/2018, Normal      QUEtiapine (SEROquel) 100 mg tablet Take 1 tablet (100 mg total) by mouth daily at bedtime, Starting Wed 6/57/0891, Normal      Salicylic Acid 26 % SOLN Apply twice daily to the wart , Print      simethicone (MYLICON) 755 MG chewable tablet Chew 1 tablet (125 mg total) every 6 (six) hours as needed for flatulence, Starting Tue 6/5/2018, Normal      sucralfate (CARAFATE) 1 g/10 mL suspension Take 10 mL (1 g total) by mouth 3 (three) times a day, Starting Wed 7/11/2018, Normal      TiZANidine (ZANAFLEX) 4 MG capsule Take 1 capsule (4 mg total) by mouth 3 (three) times a day, Starting Mon 7/23/2018, Normal       !! - Potential duplicate medications found  Please discuss with provider  No discharge procedures on file      ED Provider  Electronically Signed by           Wilder Casas DO  10/09/18 0900

## 2018-10-07 NOTE — DISCHARGE INSTRUCTIONS
Gastroenteritis   WHAT YOU NEED TO KNOW:   Gastroenteritis, or stomach flu, is an infection of the stomach and intestines  DISCHARGE INSTRUCTIONS:   Call 911 for any of the following:   · You have trouble breathing or a very fast pulse  Return to the emergency department if:   · You see blood in your diarrhea  · You cannot stop vomiting  · You have not urinated for 12 hours  · You feel like you are going to faint  Contact your healthcare provider if:   · You have a fever  · You continue to vomit or have diarrhea, even after treatment  · You see worms in your diarrhea  · Your mouth or eyes are dry  You are not urinating as much or as often  · You have questions or concerns about your condition or care  Medicines:   · Medicines  may be given to stop vomiting or diarrhea, decrease abdominal cramps, or treat an infection  · Take your medicine as directed  Contact your healthcare provider if you think your medicine is not helping or if you have side effects  Tell him or her if you are allergic to any medicine  Keep a list of the medicines, vitamins, and herbs you take  Include the amounts, and when and why you take them  Bring the list or the pill bottles to follow-up visits  Carry your medicine list with you in case of an emergency  Manage your symptoms:   · Drink liquids as directed  Ask your healthcare provider how much liquid to drink each day, and which liquids are best for you  You may also need to drink an oral rehydration solution (ORS)  An ORS has the right amounts of sugar, salt, and minerals in water to replace body fluids  · Eat bland foods  When you feel hungry, begin eating soft, bland foods  Examples are bananas, clear soup, potatoes, and applesauce  Do not have dairy products, alcohol, sugary drinks, or drinks with caffeine until you feel better  · Rest as much as possible  Slowly start to do more each day when you begin to feel better    Prevent the spread of gastroenteritis:  Gastroenteritis can spread easily  Keep yourself, your family, and your surroundings clean to help prevent the spread of gastroenteritis:  · Wash your hands often  Use soap and water  Wash your hands after you use the bathroom, change a child's diapers, or sneeze  Wash your hands before you prepare or eat food  · Clean surfaces and do laundry often  Wash your clothes and towels separately from the rest of the laundry  Clean surfaces in your home with antibacterial  or bleach  · Clean food thoroughly and cook safely  Wash raw vegetables before you cook  Cook meat, fish, and eggs fully  Do not use the same dishes for raw meat as you do for other foods  Refrigerate any leftover food immediately  · Be aware when you camp or travel  Drink only clean water  Do not drink from rivers or lakes unless you purify or boil the water first  When you travel, drink bottled water and do not add ice  Do not eat fruit that has not been peeled  Do not eat raw fish or meat that is not fully cooked  Follow up with your healthcare provider as directed:  Write down your questions so you remember to ask them during your visits  © 2017 [employer] Information is for End User's use only and may not be sold, redistributed or otherwise used for commercial purposes  All illustrations and images included in CareNotes® are the copyrighted property of A [employer]  or [employer]  The above information is an  only  It is not intended as medical advice for individual conditions or treatments  Talk to your doctor, nurse or pharmacist before following any medical regimen to see if it is safe and effective for you

## 2018-10-07 NOTE — ED NOTES
Patient has been asked to urinate again at this time  Patient states that she can not urinate as she did before coming to the ER       Paul Mckeon RN  10/07/18 3424

## 2018-10-08 ENCOUNTER — TELEPHONE (OUTPATIENT)
Dept: FAMILY MEDICINE CLINIC | Facility: CLINIC | Age: 48
End: 2018-10-08

## 2018-10-08 NOTE — TELEPHONE ENCOUNTER
~8:45 am I contacted [id number]  and spoke to a Dickson Schilder provided available dates for PT to go and get re-evaluated for a new scooter  Fax # provided [phone] to fax new scooter order to [facility]  But I had to contact [address] [place] [address] ([phone]) to see what the next steps were  Pt was informed and scheduled a November 27th Appt with [employer] to be re-evaluated for a new scooter -provided she meets all criteria

## 2018-10-08 NOTE — TELEPHONE ENCOUNTER
Contacted [place]- spoke to Ramses of the [employer] ([phone] option #2 then #4)  - she advised me that the PT would have to go through the same process she went through the first time when she received the first scooter (ex  7 [employer] Order, needs a Face-to-Face Office visit with the doctor to discuss findings of the evaluation from [employer] and needs to meet all other criteria)    Pt will be informed

## 2018-10-08 NOTE — TELEPHONE ENCOUNTER
Pt  spoke to Toya Montes , [facility] and informed us that we had not sernt the necessary paperwork and asked what was the status of the request   Leoncio Live transferred [facility] to [facility]- I had noted info on this status

## 2018-10-09 NOTE — TELEPHONE ENCOUNTER
Pt informed of process  Pt also informed that she needs to schedule an office visit (considered a Face-to-Face Encounter) Required as part of Criteria that needs to be met for new scooter

## 2018-10-15 DIAGNOSIS — M31.30 WEGENER'S GRANULOMATOSIS: Chronic | ICD-10-CM

## 2018-10-15 DIAGNOSIS — K52.9 ACUTE GASTROENTERITIS: ICD-10-CM

## 2018-10-15 DIAGNOSIS — G89.4 CHRONIC PAIN SYNDROME: ICD-10-CM

## 2018-10-15 DIAGNOSIS — F31.9 BIPOLAR 1 DISORDER (HCC): ICD-10-CM

## 2018-10-17 ENCOUNTER — TELEPHONE (OUTPATIENT)
Dept: FAMILY MEDICINE CLINIC | Facility: CLINIC | Age: 48
End: 2018-10-17

## 2018-10-17 RX ORDER — OXYCODONE AND ACETAMINOPHEN 7.5; 325 MG/1; MG/1
1 TABLET ORAL EVERY 6 HOURS PRN
Qty: 120 TABLET | Refills: 0 | Status: SHIPPED | OUTPATIENT
Start: 2018-10-17 | End: 2018-11-14 | Stop reason: SDUPTHER

## 2018-10-17 RX ORDER — ONDANSETRON 4 MG/1
4 TABLET, ORALLY DISINTEGRATING ORAL EVERY 8 HOURS PRN
Qty: 30 TABLET | Refills: 0 | Status: SHIPPED | OUTPATIENT
Start: 2018-10-17 | End: 2018-12-10 | Stop reason: SDUPTHER

## 2018-10-17 RX ORDER — DEXTROAMPHETAMINE SACCHARATE, AMPHETAMINE ASPARTATE MONOHYDRATE, DEXTROAMPHETAMINE SULFATE AND AMPHETAMINE SULFATE 5; 5; 5; 5 MG/1; MG/1; MG/1; MG/1
20 CAPSULE, EXTENDED RELEASE ORAL 2 TIMES DAILY
Qty: 60 CAPSULE | Refills: 0 | Status: SHIPPED | OUTPATIENT
Start: 2018-10-17 | End: 2018-11-14 | Stop reason: SDUPTHER

## 2018-10-17 NOTE — TELEPHONE ENCOUNTER
Pt  has been in the hospital due to GI issues approx 7 times  She made an appt with [facility] but next appt is Nov 29  ED advised Pt  to contact PCP to assist in getting a sooner appt because she needs the Endoscopy & Colonoscopy STAT  I'm not sure if Kathleen SHAFER can help with this?

## 2018-10-17 NOTE — TELEPHONE ENCOUNTER
Typically Kd Camacho would call the office to see if there is a possibility for the patient to get in sooner  Unfortunately she was scheduled to have EGD and colonoscopy with [facility] in May but it was cancelled

## 2018-10-19 ENCOUNTER — TELEPHONE (OUTPATIENT)
Dept: FAMILY MEDICINE CLINIC | Facility: CLINIC | Age: 48
End: 2018-10-19

## 2018-10-19 DIAGNOSIS — J45.20 MILD INTERMITTENT ASTHMA WITHOUT COMPLICATION: Primary | Chronic | ICD-10-CM

## 2018-10-19 RX ORDER — ALBUTEROL SULFATE 90 UG/1
2 AEROSOL, METERED RESPIRATORY (INHALATION) EVERY 6 HOURS PRN
Qty: 18 G | Refills: 0 | Status: SHIPPED | OUTPATIENT
Start: 2018-10-19 | End: 2018-12-12 | Stop reason: SDUPTHER

## 2018-10-19 NOTE — TELEPHONE ENCOUNTER
Pt is having asthma symptoms because she is taking care of neighbors pets - Refill request for Ventolin  mcg/ACT Inhalation Aerosol Solution -inhale 2 puffs Every 4-6 hours as needed to [address] in chart     CLERICAL:  copied

## 2018-10-19 NOTE — TELEPHONE ENCOUNTER
PT CALLING THE GI DR SHE WAS SUPPOSE TO HAVE THE EGD AND COLONSCOPY BACK IN MAY TO SEE IF THEY CAN GET HER IN SOONER

## 2018-11-01 ENCOUNTER — TELEPHONE (OUTPATIENT)
Dept: FAMILY MEDICINE CLINIC | Facility: CLINIC | Age: 48
End: 2018-11-01

## 2018-11-02 ENCOUNTER — TELEPHONE (OUTPATIENT)
Dept: FAMILY MEDICINE CLINIC | Facility: CLINIC | Age: 48
End: 2018-11-02

## 2018-11-14 ENCOUNTER — OFFICE VISIT (OUTPATIENT)
Dept: FAMILY MEDICINE CLINIC | Facility: CLINIC | Age: 48
End: 2018-11-14
Payer: COMMERCIAL

## 2018-11-14 VITALS
BODY MASS INDEX: 38.61 KG/M2 | OXYGEN SATURATION: 97 % | SYSTOLIC BLOOD PRESSURE: 122 MMHG | HEART RATE: 95 BPM | DIASTOLIC BLOOD PRESSURE: 82 MMHG | RESPIRATION RATE: 18 BRPM | TEMPERATURE: 96 F | WEIGHT: 204.5 LBS | HEIGHT: 61 IN

## 2018-11-14 DIAGNOSIS — M31.30 WEGENER'S GRANULOMATOSIS: Chronic | ICD-10-CM

## 2018-11-14 DIAGNOSIS — G89.29 CHRONIC LOW BACK PAIN WITH BILATERAL SCIATICA, UNSPECIFIED BACK PAIN LATERALITY: ICD-10-CM

## 2018-11-14 DIAGNOSIS — M79.605 LEFT LEG PAIN: ICD-10-CM

## 2018-11-14 DIAGNOSIS — Z79.899 CONTROLLED SUBSTANCE AGREEMENT SIGNED: ICD-10-CM

## 2018-11-14 DIAGNOSIS — J30.1 SEASONAL ALLERGIC RHINITIS DUE TO POLLEN: Primary | ICD-10-CM

## 2018-11-14 DIAGNOSIS — F11.90 CHRONIC NARCOTIC USE: ICD-10-CM

## 2018-11-14 DIAGNOSIS — F31.9 BIPOLAR 1 DISORDER (HCC): ICD-10-CM

## 2018-11-14 DIAGNOSIS — R07.9 CHEST PAIN, UNSPECIFIED TYPE: ICD-10-CM

## 2018-11-14 DIAGNOSIS — M54.41 CHRONIC LOW BACK PAIN WITH BILATERAL SCIATICA, UNSPECIFIED BACK PAIN LATERALITY: ICD-10-CM

## 2018-11-14 DIAGNOSIS — M54.42 CHRONIC LOW BACK PAIN WITH BILATERAL SCIATICA, UNSPECIFIED BACK PAIN LATERALITY: ICD-10-CM

## 2018-11-14 DIAGNOSIS — G89.4 CHRONIC PAIN SYNDROME: ICD-10-CM

## 2018-11-14 PROCEDURE — 1036F TOBACCO NON-USER: CPT | Performed by: PHYSICIAN ASSISTANT

## 2018-11-14 PROCEDURE — 3008F BODY MASS INDEX DOCD: CPT | Performed by: PHYSICIAN ASSISTANT

## 2018-11-14 PROCEDURE — 99213 OFFICE O/P EST LOW 20 MIN: CPT | Performed by: PHYSICIAN ASSISTANT

## 2018-11-14 PROCEDURE — 99051 MED SERV EVE/WKEND/HOLIDAY: CPT | Performed by: PHYSICIAN ASSISTANT

## 2018-11-14 RX ORDER — TIZANIDINE HYDROCHLORIDE 4 MG/1
4 CAPSULE, GELATIN COATED ORAL 3 TIMES DAILY
Qty: 90 CAPSULE | Refills: 2 | Status: SHIPPED | OUTPATIENT
Start: 2018-11-14 | End: 2019-03-04 | Stop reason: SDUPTHER

## 2018-11-14 RX ORDER — DEXTROAMPHETAMINE SACCHARATE, AMPHETAMINE ASPARTATE MONOHYDRATE, DEXTROAMPHETAMINE SULFATE AND AMPHETAMINE SULFATE 5; 5; 5; 5 MG/1; MG/1; MG/1; MG/1
20 CAPSULE, EXTENDED RELEASE ORAL 2 TIMES DAILY
Qty: 60 CAPSULE | Refills: 0 | Status: SHIPPED | OUTPATIENT
Start: 2018-11-14 | End: 2018-12-10 | Stop reason: SDUPTHER

## 2018-11-14 RX ORDER — OXYCODONE AND ACETAMINOPHEN 7.5; 325 MG/1; MG/1
1 TABLET ORAL EVERY 6 HOURS PRN
Qty: 120 TABLET | Refills: 0 | Status: SHIPPED | OUTPATIENT
Start: 2018-11-14 | End: 2018-12-10 | Stop reason: SDUPTHER

## 2018-11-14 NOTE — PROGRESS NOTES
Assessment/Plan:    Chronic pain  Stable  Will continue with Percocet at this time  Bipolar 1 disorder (HCC)  Stable  Continue with the Adderall and Seroquel  Seasonal allergic rhinitis due to pollen  Gave referral to allergist   Continue with current medications at this time  Left leg pain  With concerns of previous blood clot, and point tenderness, will order duplex for further evaluation of possible DVT  If there is any increased swelling, or erythema, recommend going to the ED  Pending results may order further testing  Recommend applying ice to the area 2 to 3 times a day  Chronic narcotic use  [place] drug database was reviewed, no concerns at this time  Diagnoses and all orders for this visit:    Seasonal allergic rhinitis due to pollen  -     Ambulatory referral to Allergy; Future    Left leg pain  -     VAS lower limb venous duplex study, unilateral/limited; Future    Chest pain, unspecified type  -     XR chest pa & lateral; Future    Wegener's granulomatosis ([address] )  -     oxyCODONE-acetaminophen (PERCOCET) 7 5-325 MG per tablet; Take 1 tablet by mouth every 6 (six) hours as needed (pain) Max Daily Amount: 4 tablets    Chronic pain syndrome  -     oxyCODONE-acetaminophen (PERCOCET) 7 5-325 MG per tablet; Take 1 tablet by mouth every 6 (six) hours as needed (pain) Max Daily Amount: 4 tablets    Bipolar 1 disorder (HCC)  -     amphetamine-dextroamphetamine (ADDERALL XR) 20 MG 24 hr capsule; Take 1 capsule (20 mg total) by mouth 2 (two) times a day Max Daily Amount: 40 mg    Controlled substance agreement signed    Chronic narcotic use          Subjective:      Patient ID: Bar Paige is a 52 y o  female  17-year-old female presenting with concerns of allergies  Patient is currently on Singulair, Zyrtec, Flonase  Patient states that her allergies have not been under control    Has noticed that when a neighbor comes over to her house that her symptoms typically get a lot worse   Is not sure of exactly put she may be allergic to  Her GI doctors also concern for possible food allergies  Is requesting referral to an allergist   Patient has concerns with left leg pain  Has point tenderness above lateral aspect of left knee  States it feels like there is a small ball there  Area is nontender normally, but is tender to palpation  Was previously wearing leg braces, but has not been wearing them for months  Has not noticed any swelling or erythema in the area  Patient does have a history of DVT  Has noticed some swelling in her feet  Denies any recent trauma  Patient does have on and off chest pain and shortness of breath  Patient also needs refill of her Percocet and Adderall  No concerns at this time  Percocet has been helping with the pain  Has also been helping with the current leg pain  Medrol has been helping with the cataplexy  The following portions of the patient's history were reviewed and updated as appropriate:   She  has a past medical history of Anemia of chronic disease; Anxiety; Asthma; Asthma; Chronic abdominal pain; CKD (chronic kidney disease) stage 3, GFR 30-59 ml/min ([address] ); 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[address] ); Cushing syndrome ([address] ); Diabetes mellitus ([address] ); DVT (deep venous thrombosis) ([address] ); History of acute pancreatitis; HTN (hypertension); Hyperlipidemia; Hypertension; Microscopic polyangiitis ([address] ); Morbid obesity ([address] ); MPA (microscopic polyangiitis) ([address] ); Ovarian cyst; Renal disorder; Self-inflicted injury; and Wegener's granulomatosis with renal involvement ([address] ) (2015)    She   Patient Active Problem List    Diagnosis Date Noted    Left leg pain 11/14/2018    Controlled substance agreement signed 11/14/2018    Chronic narcotic use 11/14/2018    Persistent proteinuria 09/11/2018    Gastroesophageal reflux disease 07/26/2018    Weakness of both upper extremities 07/26/2018    Seasonal allergic rhinitis due to pollen 04/26/2018    Plantar wart, right foot 04/16/2018    Chronic pelvic pain in female 04/13/2018    Diarrhea of presumed infectious origin 03/28/2018    Epigastric pain 01/07/2018    Pancreatitis 01/07/2018    Ovarian cyst 01/07/2018    Postherpetic neuralgia 01/07/2018    Bipolar 1 disorder ([facility] ) 12/21/2017    Chronic flank pain 12/21/2017    Chronic pain 07/25/2017    Noncompliance 07/25/2017    Cataplexy 12/07/2016    Weakness of both lower extremities 11/29/2016    Acute pancreatitis 07/24/2016    CKD (chronic kidney disease) stage 3, GFR 30-59 ml/min ([facility])     MPA (microscopic polyangiitis) ([facility])     Asthma     HTN (hypertension)     Arthralgia of multiple joints 05/24/2016    IDDM (insulin dependent diabetes mellitus) ([place] [address] ) 04/06/2016    Dyslipidemia 04/06/2016    Wegener's granulomatosis ([place] [address] ) 04/06/2016    Anemia of chronic disease 07/01/2015     She  has a past surgical history that includes Esophagogastroduodenoscopy (09/11/2015) and Release scar contracture / graft repairs of hand  Her family history is not on file  She  reports that she quit smoking about 8 years ago  Her smoking use included Cigarettes  She has quit using smokeless tobacco  She reports that she uses drugs, including Marijuana  She reports that she does not drink alcohol    Current Outpatient Prescriptions   Medication Sig Dispense Refill    albuterol (VENTOLIN HFA) 90 mcg/act inhaler Inhale 2 puffs every 6 (six) hours as needed for wheezing 18 g 0    amLODIPine (NORVASC) 5 mg tablet Take 1 tablet (5 mg total) by mouth daily 90 tablet 2    amphetamine-dextroamphetamine (ADDERALL XR) 20 MG 24 hr capsule Take 1 capsule (20 mg total) by mouth 2 (two) times a day Max Daily Amount: 40 mg 60 capsule 0    cetirizine (ZyrTEC) 10 mg tablet Take 1 tablet (10 mg total) by mouth daily 90 tablet 0    cycloSPORINE (RESTASIS) 0 05 % ophthalmic emulsion Administer 1 drop to both eyes 2 (two) times a day      dicyclomine (BENTYL) 20 mg tablet Take 1 tablet (20 mg total) by mouth every 6 (six) hours as needed (pain) 10 tablet 0    Elastic Bandages & Supports (THUMB SPLINT/LEFT MEDIUM) MISC by Does not apply route daily 1 each 0    famotidine (PEPCID) 20 mg tablet Take 1 tablet (20 mg total) by mouth 2 (two) times a day 180 tablet 1    gabapentin (NEURONTIN) 300 mg capsule Take 1 capsule (300 mg total) by mouth 3 (three) times a day 270 capsule 1    glucose blood (FREESTYLE LITE) test strip 1 each by Other route 3 (three) times a day 100 each 5    Humidifiers (COOL MIST HUMIDIFIER 1 GALLON) MISC by Does not apply route as needed (shortness of breath) 1 each 0    insulin aspart (NovoLOG) 100 Units/mL injection pen Inject 6 Units under the skin 3 (three) times a day with meals 5 pen 1    Insulin Pen Needle (PEN NEEDLES) 31G X 8 MM MISC Inject 1 Stick under the skin 4 (four) times a day (with meals and at bedtime) 100 each 6    Lancets (FREESTYLE) lancets by Other route 3 (three) times a day 100 each 11    metoprolol succinate (TOPROL-XL) 25 mg 24 hr tablet Take 1 tablet by mouth daily      montelukast (SINGULAIR) 10 mg tablet Take 1 tablet (10 mg total) by mouth daily at bedtime 90 tablet 1    ondansetron (ZOFRAN) 4 mg tablet Take 1 tablet (4 mg total) by mouth every 8 (eight) hours as needed for nausea or vomiting 30 tablet 0    ondansetron (ZOFRAN-ODT) 4 mg disintegrating tablet Take 1 tablet (4 mg total) by mouth every 8 (eight) hours as needed for nausea or vomiting 30 tablet 0    ondansetron (ZOFRAN-ODT) 4 mg disintegrating tablet Take 1 tablet (4 mg total) by mouth every 8 (eight) hours as needed for nausea or vomiting 30 tablet 0    oxyCODONE-acetaminophen (PERCOCET) 7 5-325 MG per tablet Take 1 tablet by mouth every 6 (six) hours as needed (pain) Max Daily Amount: 4 tablets 120 tablet 0    QUEtiapine (SEROquel) 100 mg tablet Take 1 tablet (100 mg total) by mouth daily at bedtime 90 tablet 1    simethicone (MYLICON) 682 MG chewable tablet Chew 1 tablet (125 mg total) every 6 (six) hours as needed for flatulence 40 tablet 0    sucralfate (CARAFATE) 1 g/10 mL suspension Take 10 mL (1 g total) by mouth 3 (three) times a day 420 mL 2    TiZANidine (ZANAFLEX) 4 MG capsule Take 1 capsule (4 mg total) by mouth 3 (three) times a day 90 capsule 2    fluticasone (FLONASE) 50 mcg/act nasal spray 1 spray into each nostril daily (Patient not taking: Reported on 9/12/2018 ) 16 g 0    insulin glargine (LANTUS SOLOSTAR) injection pen 100 units/mL Inject 0 22 mL (22 Units total) under the skin daily at bedtime (Patient not taking: Reported on 9/12/2018 ) 5 pen 1    lidocaine (XYLOCAINE) 5 % ointment Apply topically 2 (two) times a day as needed for mild pain Please apply 5g twice daily as needed (Patient not taking: Reported on 9/12/2018 ) 35 44 g 0    polyethylene glycol (GOLYTELY) 4000 mL solution Take 4,000 mL by mouth once for 1 dose 4129 mL 0    Salicylic Acid 26 % SOLN Apply twice daily to the wart  (Patient not taking: Reported on 9/12/2018 ) 1 Bottle 5     No current facility-administered medications for this visit  She is allergic to amlodipine; bactrim [sulfamethoxazole-trimethoprim]; haldol [haloperidol]; ibuprofen; navane [thiothixene]; other; prozac [fluoxetine hcl]; and lexapro [escitalopram oxalate]       Review of Systems   Constitutional: Negative for chills, diaphoresis, fatigue and fever  HENT: Positive for congestion, postnasal drip, rhinorrhea and sneezing  Negative for ear pain, sinus pressure and sore throat  Respiratory: Positive for chest tightness, shortness of breath and wheezing  Negative for cough  Cardiovascular: Positive for leg swelling  Musculoskeletal: Positive for gait problem  Negative for arthralgias, joint swelling and myalgias  Skin: Negative for rash and wound  Neurological: Positive for weakness and numbness           Objective:      /82 (BP Location: Right arm, Patient Position: Sitting, Cuff Size: Large)   Pulse 95   Temp (!) 96 °F (35 6 °C) (Tympanic)   Resp 18   Ht 5' 1" (1 549 m)   Wt 92 8 kg (204 lb 8 oz)   LMP  (LMP Unknown)   SpO2 97%   BMI 38 64 kg/m²          Physical Exam   Constitutional: She appears well-developed and well-nourished  No distress  HENT:   Right Ear: Hearing, tympanic membrane, external ear and ear canal normal    Left Ear: Hearing, tympanic membrane, external ear and ear canal normal    Nose: Mucosal edema and rhinorrhea present  Mouth/Throat: Oropharynx is clear and moist and mucous membranes are normal    Cardiovascular: Normal rate, regular rhythm and normal heart sounds  Exam reveals no gallop and no friction rub  No murmur heard  Pulmonary/Chest: Effort normal and breath sounds normal  No respiratory distress  She has no wheezes  She has no rales  Musculoskeletal: She exhibits edema (Non pitting edema noted in bilateral ankles)  Left knee: She exhibits normal range of motion, no swelling, no effusion and no bony tenderness  Lumbar back: She exhibits decreased range of motion, tenderness, bony tenderness, pain and spasm  Left upper leg: She exhibits tenderness  She exhibits no swelling and no edema  Legs:  Neurological:   Decrease strength in bilateral lower extremities   Skin: She is not diaphoretic  Psychiatric: She has a normal mood and affect   Her behavior is normal  Thought content normal

## 2018-11-14 NOTE — ASSESSMENT & PLAN NOTE
With concerns of previous blood clot, and point tenderness, will order duplex for further evaluation of possible DVT  If there is any increased swelling, or erythema, recommend going to the ED  Pending results may order further testing  Recommend applying ice to the area 2 to 3 times a day

## 2018-11-15 DIAGNOSIS — IMO0001 IDDM (INSULIN DEPENDENT DIABETES MELLITUS): Primary | Chronic | ICD-10-CM

## 2018-11-15 DIAGNOSIS — J30.1 SEASONAL ALLERGIC RHINITIS DUE TO POLLEN: ICD-10-CM

## 2018-11-15 RX ORDER — MONTELUKAST SODIUM 10 MG/1
10 TABLET ORAL
Qty: 90 TABLET | Refills: 3 | Status: ON HOLD | OUTPATIENT
Start: 2018-11-15 | End: 2018-12-14

## 2018-11-19 ENCOUNTER — HOSPITAL ENCOUNTER (OUTPATIENT)
Dept: NON INVASIVE DIAGNOSTICS | Facility: HOSPITAL | Age: 48
Discharge: HOME | End: 2018-11-19
Payer: COMMERCIAL

## 2018-11-19 ENCOUNTER — HOSPITAL ENCOUNTER (OUTPATIENT)
Dept: RADIOLOGY | Facility: HOSPITAL | Age: 48
Discharge: HOME | End: 2018-11-19
Payer: COMMERCIAL

## 2018-11-19 DIAGNOSIS — R07.9 CHEST PAIN, UNSPECIFIED TYPE: ICD-10-CM

## 2018-11-19 DIAGNOSIS — M79.605 LEFT LEG PAIN: ICD-10-CM

## 2018-11-19 PROCEDURE — 71046 X-RAY EXAM CHEST 2 VIEWS: CPT

## 2018-11-19 PROCEDURE — 93971 EXTREMITY STUDY: CPT

## 2018-11-20 PROCEDURE — 93971 EXTREMITY STUDY: CPT | Performed by: SURGERY

## 2018-12-06 ENCOUNTER — OFFICE VISIT (OUTPATIENT)
Dept: FAMILY MEDICINE CLINIC | Facility: CLINIC | Age: 48
End: 2018-12-06
Payer: COMMERCIAL

## 2018-12-06 VITALS
HEIGHT: 61 IN | RESPIRATION RATE: 18 BRPM | DIASTOLIC BLOOD PRESSURE: 80 MMHG | WEIGHT: 204.56 LBS | BODY MASS INDEX: 38.62 KG/M2 | SYSTOLIC BLOOD PRESSURE: 120 MMHG | TEMPERATURE: 97.3 F

## 2018-12-06 DIAGNOSIS — R29.898 WEAKNESS OF BOTH UPPER EXTREMITIES: ICD-10-CM

## 2018-12-06 DIAGNOSIS — G62.9 SMALL FIBER NEUROPATHY: Primary | ICD-10-CM

## 2018-12-06 DIAGNOSIS — M31.30 WEGENER'S GRANULOMATOSIS: Chronic | ICD-10-CM

## 2018-12-06 DIAGNOSIS — R29.898 WEAKNESS OF BOTH LOWER EXTREMITIES: ICD-10-CM

## 2018-12-06 DIAGNOSIS — G47.411 CATAPLEXY: ICD-10-CM

## 2018-12-06 PROCEDURE — 99213 OFFICE O/P EST LOW 20 MIN: CPT | Performed by: PHYSICIAN ASSISTANT

## 2018-12-06 PROCEDURE — 99051 MED SERV EVE/WKEND/HOLIDAY: CPT | Performed by: PHYSICIAN ASSISTANT

## 2018-12-06 PROCEDURE — 3008F BODY MASS INDEX DOCD: CPT | Performed by: PHYSICIAN ASSISTANT

## 2018-12-06 NOTE — PROGRESS NOTES
Assessment/Plan:    Small fiber neuropathy  A1c has been well under control since being in this practice  Unsure possibly patient had elevated A1c prior to coming to this practice  Still possibility that symptoms are due to diabetes  Will also order B12 for further evaluation  With patient's concern of symptoms, and numbness in other locations, will refer to Neurology for further evaluation  Wegener's granulomatosis ([facility] [address] )  Gave scripts for lift chair, hospital bed mattress, and hospital bed table  Diagnoses and all orders for this visit:    Small fiber neuropathy  -     Ambulatory referral to Neurology; Future  -     Cancel: Vitamin B12; Future    Weakness of both upper extremities  -     Specialty Mattress  -     Seat Lift Chair  -     Over Head Bed Tables    Weakness of both lower extremities  -     Specialty Mattress  -     Seat Lift Chair  -     Over Head Bed Tables    Cataplexy  -     Specialty Mattress  -     Seat Lift Chair  -     Over Head Bed Tables    Wegener's granulomatosis [facility])  -     Specialty Mattress  -     Seat Lift Chair  -     Over Head Bed Tables          Subjective:      Patient ID: Robert Ventura is a 52 y o  female  80-year-old female presenting for follow-up of biopsy results completed by Podiatry  Patient recently had biopsies done of the calf muscles results came back showing signs of small fiber neuropathy  She was told that it is severe neuropathy, and that there might be in nothing that can be done for it  Patient is concerned because she does have episodes of numbness other locations of the body  Patient has been diagnosed with diabetes in the past   Last A1c was 6 1%  She has been using her insulin on an as-needed basis  Has been trying to control her diabetes with diet  Patient has also been experiencing issues with both lower and upper extremity weakness, and does have episodes of cataplexy    Patient is also requesting scripts for a new mattress for her hospital bed, lift chair, and over the bed table  Because of patient's chronic condition she does have a hospital bed  She is requesting a script to replace the mattress as her current one is old and tearing  She needs a table that she will be able to use at the bed  The lift chair to assist in transitioning from sitting to standing as she could get into her better, or motorized scooter  The following portions of the patient's history were reviewed and updated as appropriate:   She  has a past medical history of Anemia of chronic disease; Anxiety; Asthma; Asthma; Chronic abdominal pain; CKD (chronic kidney disease) stage 3, GFR 30-59 ml/min ([address] ); 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[address] ); Cushing syndrome ([address] ); Diabetes mellitus ([address] ); DVT (deep venous thrombosis) ([address] ); History of acute pancreatitis; HTN (hypertension); Hyperlipidemia; Hypertension; Microscopic polyangiitis ([address] ); Morbid obesity ([address] ); MPA (microscopic polyangiitis) ([address] ); Ovarian cyst; Renal disorder; Self-inflicted injury; and Wegener's granulomatosis with renal involvement ([address] ) (2015)    She   Patient Active Problem List    Diagnosis Date Noted    Small fiber neuropathy 12/06/2018    Left leg pain 11/14/2018    Controlled substance agreement signed 11/14/2018    Chronic narcotic use 11/14/2018    Persistent proteinuria 09/11/2018    Gastroesophageal reflux disease 07/26/2018    Weakness of both upper extremities 07/26/2018    Seasonal allergic rhinitis due to pollen 04/26/2018    Plantar wart, right foot 04/16/2018    Chronic pelvic pain in female 04/13/2018    Diarrhea of presumed infectious origin 03/28/2018    Epigastric pain 01/07/2018    Pancreatitis 01/07/2018    Ovarian cyst 01/07/2018    Postherpetic neuralgia 01/07/2018    Bipolar 1 disorder ([address] ) 12/21/2017    Chronic flank pain 12/21/2017    Chronic pain 07/25/2017    Noncompliance 07/25/2017    Cataplexy 12/07/2016    Weakness of both lower extremities 11/29/2016    Acute pancreatitis 07/24/2016    CKD (chronic kidney disease) stage 3, GFR 30-59 ml/min ([facility])     MPA (microscopic polyangiitis) ([facility])     Asthma     HTN (hypertension)     Arthralgia of multiple joints 05/24/2016    IDDM (insulin dependent diabetes mellitus) ([facility] ) 04/06/2016    Dyslipidemia 04/06/2016    Wegener's granulomatosis ([facility] ) 04/06/2016    Anemia of chronic disease 07/01/2015     She  has a past surgical history that includes Esophagogastroduodenoscopy (09/11/2015) and Release scar contracture / graft repairs of hand  Her family history is not on file  She  reports that she quit smoking about 8 years ago  Her smoking use included Cigarettes  She has quit using smokeless tobacco  She reports that she uses drugs, including Marijuana  She reports that she does not drink alcohol    Current Outpatient Prescriptions   Medication Sig Dispense Refill    albuterol (VENTOLIN HFA) 90 mcg/act inhaler Inhale 2 puffs every 6 (six) hours as needed for wheezing 18 g 0    amLODIPine (NORVASC) 5 mg tablet Take 1 tablet (5 mg total) by mouth daily 90 tablet 2    amphetamine-dextroamphetamine (ADDERALL XR) 20 MG 24 hr capsule Take 1 capsule (20 mg total) by mouth 2 (two) times a day Max Daily Amount: 40 mg 60 capsule 0    cetirizine (ZyrTEC) 10 mg tablet Take 1 tablet (10 mg total) by mouth daily 90 tablet 0    cycloSPORINE (RESTASIS) 0 05 % ophthalmic emulsion Administer 1 drop to both eyes 2 (two) times a day      dicyclomine (BENTYL) 20 mg tablet Take 1 tablet (20 mg total) by mouth every 6 (six) hours as needed (pain) 10 tablet 0    Elastic Bandages & Supports (THUMB SPLINT/LEFT MEDIUM) MISC by Does not apply route daily 1 each 0    famotidine (PEPCID) 20 mg tablet Take 1 tablet (20 mg total) by mouth 2 (two) times a day 180 tablet 1    fluticasone (FLONASE) 50 mcg/act nasal spray 1 spray into each nostril daily (Patient not taking: Reported on 9/12/2018 ) 16 g 0    gabapentin (NEURONTIN) 300 mg capsule Take 1 capsule (300 mg total) by mouth 3 (three) times a day 270 capsule 1    glucose blood (FREESTYLE LITE) test strip 1 each by Other route 3 (three) times a day 100 each 5    Humidifiers (COOL MIST HUMIDIFIER 1 GALLON) MISC by Does not apply route as needed (shortness of breath) 1 each 0    insulin aspart (NovoLOG) 100 Units/mL injection pen Inject 6 Units under the skin 3 (three) times a day with meals 5 pen 1    Insulin Glargine (TOUJEO MAX SOLOSTAR) 300 units/mL CONCETRATED U-300 injection pen Inject 22 Units under the skin daily 3 pen 0    Insulin Pen Needle (PEN NEEDLES) 31G X 8 MM MISC Inject 1 Stick under the skin 4 (four) times a day (with meals and at bedtime) 100 each 6    Lancets (FREESTYLE) lancets by Other route 3 (three) times a day 100 each 11    lidocaine (XYLOCAINE) 5 % ointment Apply topically 2 (two) times a day as needed for mild pain Please apply 5g twice daily as needed (Patient not taking: Reported on 9/12/2018 ) 35 44 g 0    metoprolol succinate (TOPROL-XL) 25 mg 24 hr tablet Take 1 tablet by mouth daily      montelukast (SINGULAIR) 10 mg tablet Take 1 tablet (10 mg total) by mouth daily at bedtime 90 tablet 3    ondansetron (ZOFRAN) 4 mg tablet Take 1 tablet (4 mg total) by mouth every 8 (eight) hours as needed for nausea or vomiting 30 tablet 0    ondansetron (ZOFRAN-ODT) 4 mg disintegrating tablet Take 1 tablet (4 mg total) by mouth every 8 (eight) hours as needed for nausea or vomiting 30 tablet 0    ondansetron (ZOFRAN-ODT) 4 mg disintegrating tablet Take 1 tablet (4 mg total) by mouth every 8 (eight) hours as needed for nausea or vomiting 30 tablet 0    oxyCODONE-acetaminophen (PERCOCET) 7 5-325 MG per tablet Take 1 tablet by mouth every 6 (six) hours as needed (pain) Max Daily Amount: 4 tablets 120 tablet 0    polyethylene glycol (GOLYTELY) 4000 mL solution Take 4,000 mL by mouth once for 1 dose 4000 mL 0    QUEtiapine (SEROquel) 100 mg tablet Take 1 tablet (100 mg total) by mouth daily at bedtime 90 tablet 1    Salicylic Acid 26 % SOLN Apply twice daily to the wart  (Patient not taking: Reported on 9/12/2018 ) 1 Bottle 5    simethicone (MYLICON) 351 MG chewable tablet Chew 1 tablet (125 mg total) every 6 (six) hours as needed for flatulence 40 tablet 0    sucralfate (CARAFATE) 1 g/10 mL suspension Take 10 mL (1 g total) by mouth 3 (three) times a day 420 mL 2    TiZANidine (ZANAFLEX) 4 MG capsule Take 1 capsule (4 mg total) by mouth 3 (three) times a day 90 capsule 2     No current facility-administered medications for this visit  She is allergic to amlodipine; bactrim [sulfamethoxazole-trimethoprim]; haldol [haloperidol]; ibuprofen; navane [thiothixene]; other; prozac [fluoxetine hcl]; and lexapro [escitalopram oxalate]       Review of Systems   Constitutional: Negative for chills, diaphoresis, fatigue and fever  Eyes: Negative for visual disturbance  Respiratory: Negative for cough, chest tightness, shortness of breath and wheezing  Cardiovascular: Negative for chest pain and palpitations  Gastrointestinal: Positive for abdominal pain and nausea  Negative for constipation, diarrhea and vomiting  Musculoskeletal: Positive for arthralgias, back pain, gait problem and myalgias  Neurological: Positive for dizziness, weakness and numbness  Negative for tremors and headaches  Psychiatric/Behavioral: Negative for dysphoric mood and sleep disturbance  The patient is not nervous/anxious  Objective:      /80 (BP Location: Right arm, Patient Position: Sitting, Cuff Size: Standard)   Temp (!) 97 3 °F (36 3 °C) (Tympanic)   Resp 18   Ht 5' 1" (1 549 m)   Wt 92 8 kg (204 lb 9 oz)   LMP  (LMP Unknown)   BMI 38 65 kg/m²          Physical Exam   Constitutional: She is oriented to person, place, and time  She appears well-developed and well-nourished  No distress  Neck: Normal range of motion  Neck supple     Cardiovascular: Normal rate, regular rhythm and normal heart sounds  Exam reveals no gallop and no friction rub  Pulses are weak pulses  No murmur heard  Pulses:       Dorsalis pedis pulses are 1+ on the right side, and 1+ on the left side  Posterior tibial pulses are 1+ on the right side, and 1+ on the left side  Pulmonary/Chest: Effort normal and breath sounds normal  No respiratory distress  She has no wheezes  She has no rales  Abdominal: Normal appearance and bowel sounds are normal  There is tenderness in the epigastric area  There is no rigidity, no rebound and no guarding  Feet:   Right Foot:   Skin Integrity: Positive for callus and dry skin  Negative for ulcer, skin breakdown, erythema or warmth  Left Foot:   Skin Integrity: Positive for callus and dry skin  Negative for ulcer, skin breakdown, erythema or warmth  Lymphadenopathy:     She has no cervical adenopathy  Neurological: She is alert and oriented to person, place, and time  No cranial nerve deficit  Decreased strength bilateral lower extremities  Skin: She is not diaphoretic  Psychiatric: She has a normal mood and affect  Her behavior is normal  Thought content normal      Patient's shoes and socks removed  Right Foot/Ankle   Right Foot Inspection  Skin Exam: skin intact, dry skin, callus, pre-ulcer and callus no warmth, no erythema, no maceration, no abnormal color and no ulcer                          Toe Exam: right toe deformityno swelling, no tenderness and erythema  Sensory   Vibration: diminished  Proprioception: diminished   Monofilament testing: diminished  Vascular  Capillary refills: < 3 seconds  The right DP pulse is 1+  The right PT pulse is 1+       Left Foot/Ankle  Left Foot Inspection  Skin Exam: skin intact, dry skin, pre-ulcer and callusno warmth, no erythema, no maceration, normal color and no ulcer                         Toe Exam: left toe deformityno tenderness                   Sensory   Vibration: absent  Proprioception: diminished  Monofilament: diminished  Vascular  Capillary refills: < 3 seconds  The left DP pulse is 1+  The left PT pulse is 1+  Assign Risk Category:  Deformity present;  Loss of protective sensation; Weak pulses       Risk: 2

## 2018-12-07 ENCOUNTER — TELEPHONE (OUTPATIENT)
Dept: NEUROLOGY | Facility: CLINIC | Age: 48
End: 2018-12-07

## 2018-12-07 NOTE — ASSESSMENT & PLAN NOTE
A1c has been well under control since being in this practice  Unsure possibly patient had elevated A1c prior to coming to this practice  Still possibility that symptoms are due to diabetes  Will also order B12 for further evaluation  With patient's concern of symptoms, and numbness in other locations, will refer to Neurology for further evaluation

## 2018-12-08 ENCOUNTER — APPOINTMENT (EMERGENCY)
Dept: CT IMAGING | Facility: HOSPITAL | Age: 48
End: 2018-12-08
Payer: COMMERCIAL

## 2018-12-08 ENCOUNTER — HOSPITAL ENCOUNTER (EMERGENCY)
Facility: HOSPITAL | Age: 48
Discharge: HOME | End: 2018-12-08
Attending: EMERGENCY MEDICINE | Admitting: EMERGENCY MEDICINE
Payer: COMMERCIAL

## 2018-12-08 VITALS
DIASTOLIC BLOOD PRESSURE: 85 MMHG | OXYGEN SATURATION: 99 % | RESPIRATION RATE: 16 BRPM | SYSTOLIC BLOOD PRESSURE: 144 MMHG | HEART RATE: 82 BPM | TEMPERATURE: 98.4 F

## 2018-12-08 DIAGNOSIS — R10.9 ABDOMINAL PAIN: Primary | ICD-10-CM

## 2018-12-08 LAB
ALBUMIN SERPL BCP-MCNC: 3.5 G/DL (ref 3.5–5)
ALP SERPL-CCNC: 118 U/L (ref 46–116)
ALT SERPL W P-5'-P-CCNC: 24 U/L (ref 12–78)
ANION GAP SERPL CALCULATED.3IONS-SCNC: 12 MMOL/L (ref 4–13)
AST SERPL W P-5'-P-CCNC: 11 U/L (ref 5–45)
BACTERIA UR QL AUTO: ABNORMAL /HPF
BASOPHILS # BLD AUTO: 0.02 THOUSANDS/ΜL (ref 0–0.1)
BASOPHILS NFR BLD AUTO: 0 % (ref 0–1)
BILIRUB SERPL-MCNC: 0.16 MG/DL (ref 0.2–1)
BILIRUB UR QL STRIP: NEGATIVE
BUN SERPL-MCNC: 39 MG/DL (ref 5–25)
CALCIUM SERPL-MCNC: 9.3 MG/DL (ref 8.3–10.1)
CHLORIDE SERPL-SCNC: 103 MMOL/L (ref 100–108)
CLARITY UR: CLEAR
CO2 SERPL-SCNC: 25 MMOL/L (ref 21–32)
COLOR UR: YELLOW
CREAT SERPL-MCNC: 1.94 MG/DL (ref 0.6–1.3)
EOSINOPHIL # BLD AUTO: 0.03 THOUSAND/ΜL (ref 0–0.61)
EOSINOPHIL NFR BLD AUTO: 0 % (ref 0–6)
ERYTHROCYTE [DISTWIDTH] IN BLOOD BY AUTOMATED COUNT: 13 % (ref 11.6–15.1)
GFR SERPL CREATININE-BSD FRML MDRD: 30 ML/MIN/1.73SQ M
GLUCOSE SERPL-MCNC: 112 MG/DL (ref 65–140)
GLUCOSE UR STRIP-MCNC: NEGATIVE MG/DL
HCT VFR BLD AUTO: 40.7 % (ref 34.8–46.1)
HGB BLD-MCNC: 13.4 G/DL (ref 11.5–15.4)
HGB UR QL STRIP.AUTO: ABNORMAL
IMM GRANULOCYTES # BLD AUTO: 0.03 THOUSAND/UL (ref 0–0.2)
IMM GRANULOCYTES NFR BLD AUTO: 0 % (ref 0–2)
KETONES UR STRIP-MCNC: NEGATIVE MG/DL
LEUKOCYTE ESTERASE UR QL STRIP: ABNORMAL
LIPASE SERPL-CCNC: 322 U/L (ref 73–393)
LYMPHOCYTES # BLD AUTO: 1.55 THOUSANDS/ΜL (ref 0.6–4.47)
LYMPHOCYTES NFR BLD AUTO: 19 % (ref 14–44)
MCH RBC QN AUTO: 30.8 PG (ref 26.8–34.3)
MCHC RBC AUTO-ENTMCNC: 32.9 G/DL (ref 31.4–37.4)
MCV RBC AUTO: 94 FL (ref 82–98)
MONOCYTES # BLD AUTO: 0.7 THOUSAND/ΜL (ref 0.17–1.22)
MONOCYTES NFR BLD AUTO: 8 % (ref 4–12)
NEUTROPHILS # BLD AUTO: 5.99 THOUSANDS/ΜL (ref 1.85–7.62)
NEUTS SEG NFR BLD AUTO: 73 % (ref 43–75)
NITRITE UR QL STRIP: NEGATIVE
NON-SQ EPI CELLS URNS QL MICRO: ABNORMAL /HPF
NRBC BLD AUTO-RTO: 0 /100 WBCS
PH UR STRIP.AUTO: 6 [PH] (ref 4.5–8)
PLATELET # BLD AUTO: 246 THOUSANDS/UL (ref 149–390)
PMV BLD AUTO: 9.8 FL (ref 8.9–12.7)
POTASSIUM SERPL-SCNC: 4.1 MMOL/L (ref 3.5–5.3)
PROT SERPL-MCNC: 7.7 G/DL (ref 6.4–8.2)
PROT UR STRIP-MCNC: ABNORMAL MG/DL
RBC # BLD AUTO: 4.35 MILLION/UL (ref 3.81–5.12)
RBC #/AREA URNS AUTO: ABNORMAL /HPF
SODIUM SERPL-SCNC: 140 MMOL/L (ref 136–145)
SP GR UR STRIP.AUTO: 1.02 (ref 1–1.03)
UROBILINOGEN UR QL STRIP.AUTO: 0.2 E.U./DL
WBC # BLD AUTO: 8.32 THOUSAND/UL (ref 4.31–10.16)
WBC #/AREA URNS AUTO: ABNORMAL /HPF

## 2018-12-08 PROCEDURE — 36415 COLL VENOUS BLD VENIPUNCTURE: CPT | Performed by: EMERGENCY MEDICINE

## 2018-12-08 PROCEDURE — 74176 CT ABD & PELVIS W/O CONTRAST: CPT

## 2018-12-08 PROCEDURE — 96374 THER/PROPH/DIAG INJ IV PUSH: CPT

## 2018-12-08 PROCEDURE — 99284 EMERGENCY DEPT VISIT MOD MDM: CPT

## 2018-12-08 PROCEDURE — 96375 TX/PRO/DX INJ NEW DRUG ADDON: CPT

## 2018-12-08 PROCEDURE — 81001 URINALYSIS AUTO W/SCOPE: CPT

## 2018-12-08 PROCEDURE — 85025 COMPLETE CBC W/AUTO DIFF WBC: CPT | Performed by: EMERGENCY MEDICINE

## 2018-12-08 PROCEDURE — 83690 ASSAY OF LIPASE: CPT | Performed by: EMERGENCY MEDICINE

## 2018-12-08 PROCEDURE — 80053 COMPREHEN METABOLIC PANEL: CPT | Performed by: EMERGENCY MEDICINE

## 2018-12-08 PROCEDURE — 96361 HYDRATE IV INFUSION ADD-ON: CPT

## 2018-12-08 RX ORDER — FAMOTIDINE 20 MG/1
20 TABLET, FILM COATED ORAL 2 TIMES DAILY
Qty: 20 TABLET | Refills: 0 | Status: SHIPPED | OUTPATIENT
Start: 2018-12-08 | End: 2019-02-02 | Stop reason: HOSPADM

## 2018-12-08 RX ORDER — FENTANYL CITRATE 50 UG/ML
50 INJECTION, SOLUTION INTRAMUSCULAR; INTRAVENOUS ONCE
Status: DISCONTINUED | OUTPATIENT
Start: 2018-12-08 | End: 2018-12-08 | Stop reason: HOSPADM

## 2018-12-08 RX ORDER — ONDANSETRON 2 MG/ML
4 INJECTION INTRAMUSCULAR; INTRAVENOUS ONCE
Status: COMPLETED | OUTPATIENT
Start: 2018-12-08 | End: 2018-12-08

## 2018-12-08 RX ADMIN — SODIUM CHLORIDE 1000 ML: 0.9 INJECTION, SOLUTION INTRAVENOUS at 19:16

## 2018-12-08 RX ADMIN — FAMOTIDINE 20 MG: 10 INJECTION, SOLUTION INTRAVENOUS at 19:17

## 2018-12-08 RX ADMIN — ONDANSETRON 4 MG: 2 INJECTION INTRAMUSCULAR; INTRAVENOUS at 19:16

## 2018-12-09 NOTE — ED NOTES
Pt aware that if her pain increases to intolerable level, I will give pain medication   Pt now more relaxed and laying back in bed, on phone with female visitor at bedside     Mia Magdaleno RN  12/08/18 [id number]

## 2018-12-09 NOTE — ED NOTES
States she is "only a diabetic when it comes to certain medications"; states she does not take anything for her blood sugar  Updated on labs  Comfortable in bed, laying on her left side and chatting with her visitor  Pt stated "Once they do the CAT scan, I'm good to go right?"; informed Pt that it depends on her CT scan results and then it is determined        Kike Ricketts RN  12/08/18 [id number]

## 2018-12-09 NOTE — ED NOTES
Requested "something for the pain"  Spoke with [facility]  Pt did well with pepcid IV and zofran IV  Pt states she already has zofran at home       Naveen Varma RN  12/08/18 [id number]

## 2018-12-09 NOTE — ED PROVIDER NOTES
History  Chief Complaint   Patient presents with    Abdominal Pain     mid abd pain after eating "onions that smelt funny"      A 26-year-old female with past medical history of anemia, asthma, anxiety, CKD, Cushing's disease, diabetes, DVT, pancreatitis, hypertension, hyperlipidemia, microscopic polyangiitis and Justa's granulomatosis; presents with abdominal pain  Pain began acutely around 3 pm today  Patient states pain began after eating a bowl of chicken with vegetables  Pain is nonradiating  Pain is located in the mid abdomen, patient states different than her usual gastritis pain  Patient denies associated fever, chills, chest pain, shortness of breath, nausea, vomiting, diarrhea, dysuria, peripheral edema and rashes  Patient did not take anything for symptoms  A/P:  Abdominal pain, mild reproducible tenderness in the mid abdomen  Will check lab work for recurrent pancreatitis, anemia, renal impairment, electrolyte abnormality and biliary obstruction  Will check CT AP for further evaluation of intra-abdominal pathology  Will give Zofran, Pepcid, Fentanyl and IV fluids for symptomatic relief  History provided by:  Patient and medical records      Prior to Admission Medications   Prescriptions Last Dose Informant Patient Reported? Taking?    Elastic Bandages & Supports (THUMB SPLINT/LEFT MEDIUM) MISC   No No   Sig: by Does not apply route daily   Humidifiers (COOL MIST HUMIDIFIER 1 GALLON) MISC  Self No No   Sig: by Does not apply route as needed (shortness of breath)   Insulin Glargine (TOUJEO MAX SOLOSTAR) 300 units/mL CONCETRATED U-300 injection pen Not Taking at Unknown time  No No   Sig: Inject 22 Units under the skin daily   Patient not taking: Reported on 12/8/2018    Insulin Pen Needle (PEN NEEDLES) 31G X 8 MM MISC   No No   Sig: Inject 1 Stick under the skin 4 (four) times a day (with meals and at bedtime)   Lancets (FREESTYLE) lancets   No No   Sig: by Other route 3 (three) times a day   QUEtiapine (SEROquel) 100 mg tablet   No No   Sig: Take 1 tablet (100 mg total) by mouth daily at bedtime   Salicylic Acid 26 % SOLN   No No   Sig: Apply twice daily to the wart     Patient not taking: Reported on 2018    TiZANidine (ZANAFLEX) 4 MG capsule   No No   Sig: Take 1 capsule (4 mg total) by mouth 3 (three) times a day   albuterol (VENTOLIN HFA) 90 mcg/act inhaler   No No   Sig: Inhale 2 puffs every 6 (six) hours as needed for wheezing   amLODIPine (NORVASC) 5 mg tablet   No No   Sig: Take 1 tablet (5 mg total) by mouth daily   amphetamine-dextroamphetamine (ADDERALL XR) 20 MG 24 hr capsule   No No   Sig: Take 1 capsule (20 mg total) by mouth 2 (two) times a day Max Daily Amount: 40 mg   cetirizine (ZyrTEC) 10 mg tablet   No No   Sig: Take 1 tablet (10 mg total) by mouth daily   cycloSPORINE (RESTASIS) 0 05 % ophthalmic emulsion  Self Yes No   Sig: Administer 1 drop to both eyes 2 (two) times a day   dicyclomine (BENTYL) 20 mg tablet   No No   Sig: Take 1 tablet (20 mg total) by mouth every 6 (six) hours as needed (pain)   famotidine (PEPCID) 20 mg tablet   No No   Sig: Take 1 tablet (20 mg total) by mouth 2 (two) times a day   fluticasone (FLONASE) 50 mcg/act nasal spray   No No   Si spray into each nostril daily   Patient not taking: Reported on 2018    gabapentin (NEURONTIN) 300 mg capsule   No No   Sig: Take 1 capsule (300 mg total) by mouth 3 (three) times a day   glucose blood (FREESTYLE LITE) test strip   No No   Si each by Other route 3 (three) times a day   insulin aspart (NovoLOG) 100 Units/mL injection pen Not Taking at Unknown time  No No   Sig: Inject 6 Units under the skin 3 (three) times a day with meals   Patient not taking: Reported on 2018    lidocaine (XYLOCAINE) 5 % ointment   No No   Sig: Apply topically 2 (two) times a day as needed for mild pain Please apply 5g twice daily as needed   Patient not taking: Reported on 2018    metoprolol succinate (TOPROL-XL) 25 mg 24 hr tablet  Self Yes No   Sig: Take 1 tablet by mouth daily   montelukast (SINGULAIR) 10 mg tablet   No No   Sig: Take 1 tablet (10 mg total) by mouth daily at bedtime   ondansetron (ZOFRAN) 4 mg tablet   No No   Sig: Take 1 tablet (4 mg total) by mouth every 8 (eight) hours as needed for nausea or vomiting   ondansetron (ZOFRAN-ODT) 4 mg disintegrating tablet   No No   Sig: Take 1 tablet (4 mg total) by mouth every 8 (eight) hours as needed for nausea or vomiting   ondansetron (ZOFRAN-ODT) 4 mg disintegrating tablet   No No   Sig: Take 1 tablet (4 mg total) by mouth every 8 (eight) hours as needed for nausea or vomiting   oxyCODONE-acetaminophen (PERCOCET) 7 5-325 MG per tablet   No No   Sig: Take 1 tablet by mouth every 6 (six) hours as needed (pain) Max Daily Amount: 4 tablets   polyethylene glycol (GOLYTELY) 4000 mL solution   No No   Sig: Take 4,000 mL by mouth once for 1 dose   simethicone (MYLICON) 173 MG chewable tablet   No No   Sig: Chew 1 tablet (125 mg total) every 6 (six) hours as needed for flatulence   sucralfate (CARAFATE) 1 g/10 mL suspension   No No   Sig: Take 10 mL (1 g total) by mouth 3 (three) times a day      Facility-Administered Medications: None       Past Medical History:   Diagnosis Date    Anemia of chronic disease     Anxiety     Asthma     Asthma     Chronic abdominal pain     CKD (chronic kidney disease) stage 3, GFR 30-59 ml/min ([facility]     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HCC)     [facility]ushing syndrome ([facility] [address] [facility]     [facility]     DVT (deep venous thrombosis) ([facility])     History of acute pancreatitis     felt secondary to Bactrim    HTN (hypertension)     Hyperlipidemia     Hypertension     Microscopic polyangiitis (HCC)     Morbid obesity (HCC)     MPA (microscopic polyangiitis) (HCC)     Ovarian cyst     Renal disorder     Self-inflicted injury     self inflicted skin wounds    Wegener's granulomatosis with renal involvement [facility] ) 2015       Past Surgical History:   Procedure Laterality Date    ESOPHAGOGASTRODUODENOSCOPY  09/11/2015    mild antral gastritis    RELEASE SCAR CONTRACTURE / GRAFT REPAIRS OF HAND         Family History   Problem Relation Age of Onset    Colon cancer Neg Hx     Drug abuse Neg Hx         mother father    Mental illness Neg Hx         disorder, mother father     I have reviewed and agree with the history as documented  Social History   Substance Use Topics    Smoking status: Former Smoker     Types: Cigarettes     Quit date: 2010    Smokeless tobacco: Former User    Alcohol use No        Review of Systems   Gastrointestinal: Positive for abdominal pain  All other systems reviewed and are negative        Physical Exam  Physical Exam   General Appearance: alert and oriented, nad, non toxic appearing  Skin:  Warm, dry, intact  HEENT: atraumatic, normocephalic  Neck: Supple, trachea midline  Cardiac: RRR; no murmurs, rub, gallops  Pulmonary: lungs CTAB; no wheezes, rales, rhonchi  Gastrointestinal: abdomen soft, mild mid abdominal tenderness, nondistended; no guarding or rebound tenderness; good bowel sounds, no mass or bruits  Extremities:  no pedal edema, 2+ pulses; no calf tenderness, no clubbing, no cyanosis  Neuro:  no focal motor or sensory deficits, CN 2-12 grossly intact  Psych:  Normal mood and affect, normal judgement and insight      Vital Signs  ED Triage Vitals [12/08/18 1833]   Temperature Pulse Respirations Blood Pressure SpO2   98 4 °F (36 9 °C) 82 18 140/78 97 %      Temp Source Heart Rate Source Patient Position - Orthostatic VS BP Location FiO2 (%)   Temporal -- Sitting Left arm --      Pain Score       9           Vitals:    12/08/18 1833 12/08/18 2055   BP: 140/78 144/85   Pulse: 82 82   Patient Position - Orthostatic VS: Sitting        Visual Acuity      ED Medications  Medications   sodium chloride 0 9 % bolus 1,000 mL (0 mL Intravenous Stopped 12/8/18 2030)   ondansetron (ZOFRAN) injection 4 mg (4 mg Intravenous Given 12/8/18 1916)   famotidine (PEPCID) injection 20 mg (20 mg Intravenous Given 12/8/18 1917)       Diagnostic Studies  Results Reviewed     Procedure Component Value Units Date/Time    Urine Microscopic [[id number]]  (Abnormal) Collected:  12/08/18 1903    Lab Status:  Final result Specimen:  Urine from Urine, Clean Catch Updated:  12/08/18 1954     RBC, UA 1-2 (A) /hpf      WBC, UA 4-10 (A) /hpf      Epithelial Cells Occasional /hpf      Bacteria, UA Occasional /hpf     Comprehensive metabolic panel [[id number]]  (Abnormal) Collected:  12/08/18 1919    Lab Status:  Final result Specimen:  Blood from Arm, Right Updated:  12/08/18 1943     Sodium 140 mmol/L      Potassium 4 1 mmol/L      Chloride 103 mmol/L      CO2 25 mmol/L      ANION GAP 12 mmol/L      BUN 39 (H) mg/dL      Creatinine 1 94 (H) mg/dL      Glucose 112 mg/dL      Calcium 9 3 mg/dL      AST 11 U/L      ALT 24 U/L      Alkaline Phosphatase 118 (H) U/L      Total Protein 7 7 g/dL      Albumin 3 5 g/dL      Total Bilirubin 0 16 (L) mg/dL      eGFR 30 ml/min/1 73sq m     Narrative:         National Kidney Disease Education Program recommendations are as follows:  GFR calculation is accurate only with a steady state creatinine  Chronic Kidney disease less than 60 ml/min/1 73 sq  meters  Kidney failure less than 15 ml/min/1 73 sq  meters      Lipase [[id number]]  (Normal) Collected:  12/08/18 1919    Lab Status:  Final result Specimen:  Blood from Arm, Right Updated:  12/08/18 1943     Lipase 322 u/L     CBC and differential [[id number]] Collected:  12/08/18 1919    Lab Status:  Final result Specimen:  Blood from Arm, Right Updated:  12/08/18 1924     WBC 8 32 Thousand/uL      RBC 4 35 Million/uL      Hemoglobin 13 4 g/dL      Hematocrit 40 7 %      MCV 94 fL      MCH 30 8 pg      MCHC 32 9 g/dL      RDW 13 0 %      MPV 9 8 fL      Platelets 740 Thousands/uL      nRBC 0 /100 WBCs      Neutrophils Relative 73 %      Immat GRANS % 0 %      Lymphocytes Relative 19 %      Monocytes Relative 8 %      Eosinophils Relative 0 %      Basophils Relative 0 %      Neutrophils Absolute 5 99 Thousands/µL      Immature Grans Absolute 0 03 Thousand/uL      Lymphocytes Absolute 1 55 Thousands/µL      Monocytes Absolute 0 70 Thousand/µL      Eosinophils Absolute 0 03 Thousand/µL      Basophils Absolute 0 02 Thousands/µL     ED Urine Macroscopic [[id number]]  (Abnormal) Collected:  12/08/18 1903    Lab Status:  Final result Specimen:  Urine Updated:  12/08/18 1848     Color, UA Yellow     Clarity, UA Clear     pH, UA 6 0     Leukocytes, UA Trace (A)     Nitrite, UA Negative     Protein,  (2+) (A) mg/dl      Glucose, UA Negative mg/dl      Ketones, UA Negative mg/dl      Urobilinogen, UA 0 2 E U /dl      Bilirubin, UA Negative     Blood, UA Small (A)     Specific [place], UA 1 025    Narrative:       CLINITEK RESULT                 CT abdomen pelvis wo contrast   Final Result by Peter Gregory MD (12/08 2037)         No renal stones  No small bowel obstruction  Normal appendix  Consider contrast exam in the appropriate clinical setting  Workstation performed: [id number]                    Procedures  Procedures       Phone Contacts  ED Phone Contact    ED Course  ED Course as of Dec 09 0008   Sat Dec 08, 2018   1947 Improved from prior labs Creatinine: (!) 1 94   1947 Pt had episode of vomiting, then reported significant improvement in her pain  Fentanyl held  1955 With occasional bacteria, doubt acute UTI WBC, UA: (!) 4-10   2040 Negative for acute findings CT abdomen pelvis wo contrast   2045 Pt feeling better at this time  Never required pain medications  Symptoms possibly secondary to gastritis or irritation from food  Will proceed with discharge home                                  MDM  CritCare Time    Disposition  Final diagnoses:   Abdominal pain     Time reflects when diagnosis was documented in both MDM as applicable and the Disposition within this note     Time User Action Codes Description Comment    12/8/2018  8:46 PM Ruth Fam Add [R10 9] Abdominal pain       ED Disposition     ED Disposition Condition Comment    Discharge  Rajani John discharge to home/self care      Condition at discharge: Good        Follow-up Information     Follow up With Specialties Details Why Contact Info    Mackenzie Martinez PA-C Family Medicine, Physician Assistant Schedule an appointment as soon as possible for a visit in 2 days For re-evaluation [address] [place] [address]  [phone]            Discharge Medication List as of 12/8/2018  8:47 PM      CONTINUE these medications which have NOT CHANGED    Details   albuterol (VENTOLIN HFA) 90 mcg/act inhaler Inhale 2 puffs every 6 (six) hours as needed for wheezing, Starting Fri 10/19/2018, Normal      amLODIPine (NORVASC) 5 mg tablet Take 1 tablet (5 mg total) by mouth daily, Starting Tue 5/29/2018, Normal      amphetamine-dextroamphetamine (ADDERALL XR) 20 MG 24 hr capsule Take 1 capsule (20 mg total) by mouth 2 (two) times a day Max Daily Amount: 40 mg, Starting Wed 11/14/2018, Normal      cetirizine (ZyrTEC) 10 mg tablet Take 1 tablet (10 mg total) by mouth daily, Starting Thu 4/26/2018, Normal      cycloSPORINE (RESTASIS) 0 05 % ophthalmic emulsion Administer 1 drop to both eyes 2 (two) times a day, Historical Med      dicyclomine (BENTYL) 20 mg tablet Take 1 tablet (20 mg total) by mouth every 6 (six) hours as needed (pain), Starting Sun 10/7/2018, Print      Elastic Bandages & Supports (THUMB SPLINT/LEFT MEDIUM) MISC by Does not apply route daily, Starting Thu 4/26/2018, Print      famotidine (PEPCID) 20 mg tablet Take 1 tablet (20 mg total) by mouth 2 (two) times a day, Starting Thu 7/26/2018, Normal      fluticasone (FLONASE) 50 mcg/act nasal spray 1 spray into each nostril daily, Starting Tue 5/29/2018, Normal      gabapentin (NEURONTIN) 300 mg capsule Take 1 capsule (300 mg total) by mouth 3 (three) times a day, Starting Tue 9/18/2018, Normal      glucose blood (FREESTYLE LITE) test strip 1 each by Other route 3 (three) times a day, Starting Mon 8/13/2018, Normal      Humidifiers (COOL MIST HUMIDIFIER 1 GALLON) MISC by Does not apply route as needed (shortness of breath), Starting Tue 2/27/2018, Print      insulin aspart (NovoLOG) 100 Units/mL injection pen Inject 6 Units under the skin 3 (three) times a day with meals, Starting Mon 7/9/2018, Normal      Insulin Glargine (TOUJEO MAX SOLOSTAR) 300 units/mL CONCETRATED U-300 injection pen Inject 22 Units under the skin daily, Starting Thu 11/15/2018, Normal      Insulin Pen Needle (PEN NEEDLES) 31G X 8 MM MISC Inject 1 Stick under the skin 4 (four) times a day (with meals and at bedtime), Starting Wed 3/28/2018, Normal      Lancets (FREESTYLE) lancets by Other route 3 (three) times a day, Starting Thu 4/5/2018, Normal      lidocaine (XYLOCAINE) 5 % ointment Apply topically 2 (two) times a day as needed for mild pain Please apply 5g twice daily as needed, Starting Wed 3/28/2018, Normal      metoprolol succinate (TOPROL-XL) 25 mg 24 hr tablet Take 1 tablet by mouth daily, Starting Wed 11/4/2015, Historical Med      montelukast (SINGULAIR) 10 mg tablet Take 1 tablet (10 mg total) by mouth daily at bedtime, Starting Thu 11/15/2018, Normal      ondansetron (ZOFRAN) 4 mg tablet Take 1 tablet (4 mg total) by mouth every 8 (eight) hours as needed for nausea or vomiting, Starting Tue 9/18/2018, Normal      !! ondansetron (ZOFRAN-ODT) 4 mg disintegrating tablet Take 1 tablet (4 mg total) by mouth every 8 (eight) hours as needed for nausea or vomiting, Starting Tue 8/21/2018, Normal      !! ondansetron (ZOFRAN-ODT) 4 mg disintegrating tablet Take 1 tablet (4 mg total) by mouth every 8 (eight) hours as needed for nausea or vomiting, Starting Wed 10/17/2018, Normal      oxyCODONE-acetaminophen (PERCOCET) 7 5-325 MG per tablet Take 1 tablet by mouth every 6 (six) hours as needed (pain) Max Daily Amount: 4 tablets, Starting Wed 11/14/2018, Normal      polyethylene glycol (GOLYTELY) 4000 mL solution Take 4,000 mL by mouth once for 1 dose, Starting Wed 3/28/2018, Normal      QUEtiapine (SEROquel) 100 mg tablet Take 1 tablet (100 mg total) by mouth daily at bedtime, Starting Wed 5/07/0620, Normal      Salicylic Acid 26 % SOLN Apply twice daily to the wart , Print      simethicone (MYLICON) 661 MG chewable tablet Chew 1 tablet (125 mg total) every 6 (six) hours as needed for flatulence, Starting Tue 6/5/2018, Normal      sucralfate (CARAFATE) 1 g/10 mL suspension Take 10 mL (1 g total) by mouth 3 (three) times a day, Starting Wed 7/11/2018, Normal      TiZANidine (ZANAFLEX) 4 MG capsule Take 1 capsule (4 mg total) by mouth 3 (three) times a day, Starting Wed 11/14/2018, Normal       !! - Potential duplicate medications found  Please discuss with provider  No discharge procedures on file      ED Provider  Electronically Signed by           [id number] Darshan Prince DO  12/09/18 0008

## 2018-12-09 NOTE — DISCHARGE INSTRUCTIONS

## 2018-12-09 NOTE — ED NOTES
Pt vomited as I was placing IV and obtaining blood work  Approximately 300mL output  No blood  States her pain went from a 10/10 to a 4/10 after vomiting        Vasu Nicholson RN  12/08/18 1925

## 2018-12-10 DIAGNOSIS — K52.9 ACUTE GASTROENTERITIS: ICD-10-CM

## 2018-12-10 DIAGNOSIS — F31.9 BIPOLAR 1 DISORDER (HCC): ICD-10-CM

## 2018-12-10 DIAGNOSIS — M31.30 WEGENER'S GRANULOMATOSIS: Chronic | ICD-10-CM

## 2018-12-10 DIAGNOSIS — G89.4 CHRONIC PAIN SYNDROME: ICD-10-CM

## 2018-12-11 RX ORDER — ONDANSETRON 4 MG/1
4 TABLET, ORALLY DISINTEGRATING ORAL EVERY 8 HOURS PRN
Qty: 30 TABLET | Refills: 0 | Status: SHIPPED | OUTPATIENT
Start: 2018-12-11 | End: 2019-02-06 | Stop reason: SDUPTHER

## 2018-12-11 RX ORDER — DEXTROAMPHETAMINE SACCHARATE, AMPHETAMINE ASPARTATE MONOHYDRATE, DEXTROAMPHETAMINE SULFATE AND AMPHETAMINE SULFATE 5; 5; 5; 5 MG/1; MG/1; MG/1; MG/1
20 CAPSULE, EXTENDED RELEASE ORAL 2 TIMES DAILY
Qty: 60 CAPSULE | Refills: 0 | Status: SHIPPED | OUTPATIENT
Start: 2018-12-11 | End: 2019-01-07 | Stop reason: SDUPTHER

## 2018-12-11 RX ORDER — OXYCODONE AND ACETAMINOPHEN 7.5; 325 MG/1; MG/1
1 TABLET ORAL EVERY 6 HOURS PRN
Qty: 120 TABLET | Refills: 0 | Status: SHIPPED | OUTPATIENT
Start: 2018-12-11 | End: 2019-01-07 | Stop reason: SDUPTHER

## 2018-12-12 DIAGNOSIS — J45.20 MILD INTERMITTENT ASTHMA WITHOUT COMPLICATION: Chronic | ICD-10-CM

## 2018-12-12 RX ORDER — ALBUTEROL SULFATE 90 UG/1
2 AEROSOL, METERED RESPIRATORY (INHALATION) EVERY 6 HOURS PRN
Qty: 18 G | Refills: 1 | Status: SHIPPED | OUTPATIENT
Start: 2018-12-12 | End: 2020-09-24 | Stop reason: SDUPTHER

## 2018-12-13 ENCOUNTER — ANESTHESIA EVENT (OUTPATIENT)
Dept: GASTROENTEROLOGY | Facility: HOSPITAL | Age: 48
End: 2018-12-13
Payer: COMMERCIAL

## 2018-12-14 ENCOUNTER — ANESTHESIA (OUTPATIENT)
Dept: GASTROENTEROLOGY | Facility: HOSPITAL | Age: 48
End: 2018-12-14
Payer: COMMERCIAL

## 2018-12-14 ENCOUNTER — HOSPITAL ENCOUNTER (OUTPATIENT)
Facility: HOSPITAL | Age: 48
Setting detail: OUTPATIENT SURGERY
Discharge: HOME | End: 2018-12-14
Attending: INTERNAL MEDICINE | Admitting: INTERNAL MEDICINE
Payer: COMMERCIAL

## 2018-12-14 VITALS
WEIGHT: 204 LBS | RESPIRATION RATE: 14 BRPM | BODY MASS INDEX: 38.51 KG/M2 | OXYGEN SATURATION: 98 % | TEMPERATURE: 97.8 F | DIASTOLIC BLOOD PRESSURE: 59 MMHG | SYSTOLIC BLOOD PRESSURE: 146 MMHG | HEART RATE: 90 BPM | HEIGHT: 61 IN

## 2018-12-14 DIAGNOSIS — R10.13 EPIGASTRIC PAIN: ICD-10-CM

## 2018-12-14 DIAGNOSIS — R11.2 NAUSEA WITH VOMITING: ICD-10-CM

## 2018-12-14 DIAGNOSIS — R19.4 CHANGE IN BOWEL HABITS: ICD-10-CM

## 2018-12-14 LAB — GLUCOSE SERPL-MCNC: 100 MG/DL (ref 65–140)

## 2018-12-14 PROCEDURE — 88305 TISSUE EXAM BY PATHOLOGIST: CPT | Performed by: PATHOLOGY

## 2018-12-14 PROCEDURE — 82948 REAGENT STRIP/BLOOD GLUCOSE: CPT

## 2018-12-14 RX ORDER — SODIUM CHLORIDE 9 MG/ML
125 INJECTION, SOLUTION INTRAVENOUS CONTINUOUS
Status: DISCONTINUED | OUTPATIENT
Start: 2018-12-14 | End: 2018-12-14 | Stop reason: HOSPADM

## 2018-12-14 RX ORDER — PROPOFOL 10 MG/ML
INJECTION, EMULSION INTRAVENOUS AS NEEDED
Status: DISCONTINUED | OUTPATIENT
Start: 2018-12-14 | End: 2018-12-14 | Stop reason: SURG

## 2018-12-14 RX ADMIN — LIDOCAINE HYDROCHLORIDE 100 MG: 20 INJECTION, SOLUTION INTRAVENOUS at 11:07

## 2018-12-14 RX ADMIN — PROPOFOL 100 MG: 10 INJECTION, EMULSION INTRAVENOUS at 11:22

## 2018-12-14 RX ADMIN — PROPOFOL 100 MG: 10 INJECTION, EMULSION INTRAVENOUS at 11:18

## 2018-12-14 RX ADMIN — PROPOFOL 100 MG: 10 INJECTION, EMULSION INTRAVENOUS at 11:11

## 2018-12-14 RX ADMIN — PROPOFOL 100 MG: 10 INJECTION, EMULSION INTRAVENOUS at 11:08

## 2018-12-14 RX ADMIN — PROPOFOL 100 MG: 10 INJECTION, EMULSION INTRAVENOUS at 11:25

## 2018-12-14 RX ADMIN — PROPOFOL 100 MG: 10 INJECTION, EMULSION INTRAVENOUS at 11:24

## 2018-12-14 RX ADMIN — SODIUM CHLORIDE 125 ML/HR: 0.9 INJECTION, SOLUTION INTRAVENOUS at 10:41

## 2018-12-14 RX ADMIN — PROPOFOL 50 MG: 10 INJECTION, EMULSION INTRAVENOUS at 11:26

## 2018-12-14 RX ADMIN — PROPOFOL 100 MG: 10 INJECTION, EMULSION INTRAVENOUS at 11:07

## 2018-12-14 NOTE — DISCHARGE INSTRUCTIONS
Please call [phone] with any problems  Two small polyps removed from the colon and I have suggested repeat examination of the colon in 5 years  You had some mild diffuse gastritis  My office will contact you  Gastritis   WHAT YOU NEED TO KNOW:   What is gastritis? Gastritis is inflammation or irritation of the lining of your stomach  What increases my risk for gastritis? · Infection with bacteria, a virus, or a parasite    · NSAIDs, aspirin, or steroid medicine    · Use of tobacco products or alcohol    · Trauma such as an injury to your stomach or intestine    · Autoimmune disorders such as diabetes, thyroid disease, or Crohn disease    · Stress    · Age older than 60 years    · Illegal drugs, such as cocaine  What are the signs and symptoms of gastritis? · Stomach pain, burning, or tenderness when you press on it    · Stomach fullness or tightness    · Nausea or vomiting    · Loss of appetite, or feeling full quickly when you eat    · Bad breath    · Fatigue or feeling more tired than usual    · Heartburn  How is gastritis diagnosed? Your healthcare provider will ask about your signs and symptoms and examine you  You may need any of the following:  · Blood tests  may be used to show an infection, dehydration, or anemia (low red blood cell levels)  · A bowel movement sample  may be tested for blood or the germ that may be causing your gastritis  · A breath test  may show if H pylori is causing your gastritis  You will be given a liquid to drink  Then you will breathe into a bag  Your healthcare provider will measure the amount of carbon dioxide in your breath  Extra amounts of carbon dioxide may mean you have an H pylori infection  · An endoscopy  may be used to look for irritation or bleeding in your stomach  Your healthcare provider will use an endoscope (tube with a light and camera on the end) during the procedure   He or she may take a sample from your stomach to be tested  How is gastritis treated? Your symptoms may go away without treatment  Treatment will depend on what is causing your gastritis  Your healthcare provider may recommend changes to the medicines you take  Medicines may be given to help treat a bacterial infection or decrease stomach acid  How can I manage or prevent gastritis? · Do not smoke  Nicotine and other chemicals in cigarettes and cigars can make your symptoms worse and cause lung damage  Ask your healthcare provider for information if you currently smoke and need help to quit  E-cigarettes or smokeless tobacco still contain nicotine  Talk to your healthcare provider before you use these products  · Do not drink alcohol  Alcohol can prevent healing and make your gastritis worse  Talk to your healthcare provider if you need help to stop drinking  · Do not take NSAIDs or aspirin unless directed  These and similar medicines can cause irritation of your stomach lining  If your healthcare provider says it is okay to take NSAIDs, take them with food  · Do not eat foods that cause irritation  Foods such as oranges and salsa can cause burning or pain  Eat a variety of healthy foods  Examples include fruits (not citrus), vegetables, low-fat dairy products, beans, whole-grain breads, and lean meats and fish  Try to eat small meals, and drink water with your meals  Do not eat for at least 3 hours before you go to bed  · Find ways to relax and decrease stress  Stress can increase stomach acid and make gastritis worse  Activities such as yoga, meditation, or listening to music can help you relax  Spend time with friends, or do things you enjoy  Call 911 for any of the following:   · You develop chest pain or shortness of breath  When should I seek immediate care? · You vomit blood  · You have black or bloody bowel movements  · You have severe stomach or back pain  When should I contact my healthcare provider?    · You have a fever     · You have new or worsening symptoms, even after treatment  · You have questions or concerns about your condition or care  CARE AGREEMENT:   You have the right to help plan your care  Learn about your health condition and how it may be treated  Discuss treatment options with your caregivers to decide what care you want to receive  You always have the right to refuse treatment  The above information is an  only  It is not intended as medical advice for individual conditions or treatments  Talk to your doctor, nurse or pharmacist before following any medical regimen to see if it is safe and effective for you  © 2017 [employer]  Information is for End User's use only and may not be sold, redistributed or otherwise used for commercial purposes  All illustrations and images included in CareNotes® are the copyrighted property of [employer]  or [employer]  Diet for Stomach Ulcers and Gastritis   WHAT YOU NEED TO KNOW:   What is a diet for stomach ulcers and gastritis? A diet for ulcers and gastritis is a meal plan that limits foods that irritate your stomach  Certain foods may worsen symptoms such as stomach pain, bloating, heartburn, or indigestion  Which foods should I limit or avoid? You may need to avoid acidic, spicy, or high-fat foods  Not all foods affect everyone the same way  You will need to learn which foods worsen your symptoms and limit those foods   The following are some foods that may worsen ulcer or gastritis symptoms:  · Beverages:      ¨ Whole milk and chocolate milk    ¨ Hot cocoa and cola    ¨ Any beverage with caffeine    ¨ Regular and decaffeinated coffee    ¨ Peppermint and spearmint tea    ¨ Green and black tea, with or without caffeine    ¨ Orange and grapefruit juices    ¨ Drinks that contain alcohol    · Spices and seasonings:      ¨ Black and red pepper    ¨ Chili powder    ¨ Mustard seed and nutmeg    · Other foods: ¨ Dairy foods made from whole milk or cream    ¨ Chocolate    ¨ Spicy or strongly flavored cheeses, such as jalapeno or black pepper    ¨ Highly seasoned, high-fat meats, such as sausage, salami, coyne, ham, and cold cuts    ¨ Hot chiles and peppers    ¨ Tomato products, such as tomato paste, tomato sauce, or tomato juice  Which foods can I eat and drink? Eat a variety of healthy foods from all the food groups  Eat fruits, vegetables, whole grains, and fat-free or low-fat dairy foods  Whole grains include whole-wheat breads, cereals, pasta, and brown rice  Choose lean meats, poultry (chicken and turkey), fish, beans, eggs, and nuts  A healthy meal plan is low in unhealthy fats, salt, and added sugar  Healthy fats include olive oil and canola oil  Ask your dietitian for more information about a healthy meal plan  What other guidelines may be helpful? · Do not eat right before bedtime  Stop eating at least 2 hours before bedtime  · Eat small, frequent meals  Your stomach may tolerate small, frequent meals better than large meals  CARE AGREEMENT:   You have the right to help plan your care  Discuss treatment options with your caregivers to decide what care you want to receive  You always have the right to refuse treatment  The above information is an  only  It is not intended as medical advice for individual conditions or treatments  Talk to your doctor, nurse or pharmacist before following any medical regimen to see if it is safe and effective for you  © 2017 [employer]  Information is for End User's use only and may not be sold, redistributed or otherwise used for commercial purposes  All illustrations and images included in CareNotes® are the copyrighted property of [employer]  or [employer]  Colorectal Polyps   WHAT YOU NEED TO KNOW:   Colorectal polyps are small growths of tissue in the lining of the colon and rectum   Most polyps are hyperplastic polyps and are usually benign (noncancerous)  Certain types of polyps, called adenomatous polyps, may turn into cancer  DISCHARGE INSTRUCTIONS:   Follow up with your healthcare provider or gastroenterologist as directed: You may need to return for more tests, such as another colonoscopy  Write down your questions so you remember to ask them during your visits  Reduce your risk for colorectal polyps:   · Eat a variety of healthy foods:  Healthy foods include fruit, vegetables, whole-grain breads, low-fat dairy products, beans, lean meat, and fish  Ask if you need to be on a special diet  · Maintain a healthy weight:  Ask your healthcare provider if you need to lose weight and how much you need to lose  Ask for help with a weight loss program     · Exercise:  Begin to exercise slowly and do more as you get stronger  Talk with your healthcare provider before you start an exercise program      · Limit alcohol:  Your risk for polyps increases the more you drink  · Do not smoke: If you smoke, it is never too late to quit  Ask for information about how to stop  For support and more information:   · [facility] [address], [place] [address]  Phone: [phone]  Web Address: www digestive  niddk nih gov  Contact your healthcare provider or gastroenterologist if:   · You have a fever  · You have chills, a cough, or feel weak and achy  · You have abdominal pain that does not go away or gets worse after you take medicine  · Your abdomen is swollen  · You are losing weight without trying  · You have questions or concerns about your condition or care  Seek care immediately or call 911 if:   · You have sudden shortness of breath  · You have a fast heart rate, fast breathing, or are too dizzy to stand up  · You have severe abdominal pain  · You see blood in your bowel movement    © 2017 [employer]  Information is for End User's use only and may not be sold, redistributed or otherwise used for commercial purposes  All illustrations and images included in CareNotes® are the copyrighted property of [employer] , Inc  or Gian Matias  The above information is an  only  It is not intended as medical advice for individual conditions or treatments  Talk to your doctor, nurse or pharmacist before following any medical regimen to see if it is safe and effective for you  Upper Endoscopy   WHAT YOU NEED TO KNOW:   An upper endoscopy is also called an upper gastrointestinal (GI) endoscopy, or an esophagogastroduodenoscopy (EGD)  You may feel bloated, gassy, or have some abdominal discomfort after your procedure  Your throat may be sore for 24 to 36 hours  You may burp or pass gas from air that is still inside your body  DISCHARGE INSTRUCTIONS:   Call 911 if:   · You have sudden chest pain or trouble breathing  Seek care immediately if:   · You feel dizzy or faint  · You have trouble swallowing  · You have severe throat pain  · Your bowel movements are very dark or black  · Your abdomen is hard and firm and you have severe pain  · You vomit blood  Contact your healthcare provider if:   · You feel full or bloated and cannot burp or pass gas  · You have not had a bowel movement for 3 days after your procedure  · You have neck pain  · You have a fever or chills  · You have nausea or are vomiting  · You have a rash or hives  · You have questions or concerns about your endoscopy  Relieve a sore throat:  Suck on throat lozenges or crushed ice  Gargle with a small amount of warm salt water  Mix 1 teaspoon of salt and 1 cup of warm water to make salt water  Relieve gas and discomfort from bloating:  Lie on your right side with a heating pad on your abdomen  Take short walks to help pass gas  Eat small meals until bloating is relieved    Rest after your procedure: Do not drive or make important decisions until the day after your procedure  Return to your normal activity as directed  You can usually return to work the day after your procedure  Follow up with your healthcare provider as directed:  Write down your questions so you remember to ask them during your visits  © 2017 [employer] Information is for End User's use only and may not be sold, redistributed or otherwise used for commercial purposes  All illustrations and images included in CareNotes® are the copyrighted property of [employer]  or [employer]  The above information is an  only  It is not intended as medical advice for individual conditions or treatments  Talk to your doctor, nurse or pharmacist before following any medical regimen to see if it is safe and effective for you  Colonoscopy   AMBULATORY CARE:   What you need to know about a colonoscopy:  A colonoscopy is a procedure to examine the inside of your colon (intestine) with a scope  A scope is a flexible tube with a small light and camera on the end  Polyps or tissue growths may be removed during your colonoscopy  What you need to do the week before your colonoscopy: You will need to stop taking medicines that contain aspirin or iron for 7 days before your colonoscopy  If you take anticoagulants, such as warfarin, ask when you should stop taking it  Make plans for someone to drive you home after your procedure  How to prepare for your colonoscopy: Your healthcare provider will have you prepare your bowels before your procedure  Your bowels will need to be empty before your procedure to allow him to clearly see your colon  You will need to do the following the day before your procedure:  · Have only clear liquids  for the entire day before your colonoscopy  Clear liquid diet includes clear fruit juices and broths, clear flavored gelatin, and hard candy   It also includes coffee, tea, carbonated beverages, and clear sports drinks  · Follow your bowel prep as directed  There are many different preparations that can be given before a colonoscopy  Some are given over 2 hours and others over 6 hours  Some are given earlier in the afternoon the day before the colonoscopy  Others are given the day before and then the morning of the colonoscopy  With any bowel prep, stay close to the bathroom  This liquid will cause your bowels to move frequently  · An enema  may be needed  Your healthcare provider may tell you to use an enema to help clean out your bowels  · Do not eat or drink anything after midnight  This will help prevent problems that can happen if you vomit while under anesthesia  What will happen during your colonoscopy:   · You will be given medicine to help you relax  You will lie on your left side and raise one or both knees toward your chest  Your healthcare provider will examine your anus and use a finger to check your rectum  You may need another enema if your bowel is not empty  The scope will be lubricated and gently placed into your anus  It will then be passed through your rectum and into your colon  Water or air will be put into your colon to help clean or expand it  This is done so your healthcare provider can see your colon clearly  · Tissue samples may be taken from the walls of your bowel and sent to a lab for tests  If you have a polyp, your healthcare provider will pass a wire loop through the scope and use it to hold the polyp  The polyp is then burned or cut off the wall of your colon  Removed polyps are sent to a lab for tests  Pictures of your colon may be taken during the procedure  The scope will be removed when the procedure is done  What will happen after your colonoscopy:   · Rest after your procedure  You may feel bloated, have some gas and abdominal discomfort  You may need to lie on your right side with a heating pad on your abdomen   You may need to take short walks to help move the gas out  Eat small meals, if you feel bloated  Do not drive or make important decisions until the day after your procedure  · You may have polyps removed  Do not take aspirin or go on long car trips for 7 days after your procedure  Ask your healthcare provider about any other limits after your procedure  Risks of a colonoscopy: You may have pain or bleeding after the scope or polyps are removed  You may also have a slow heartbeat, decreased blood pressure, or increased sweating  Your colon may tear due to the increased pressure from the scope and other instruments  This may cause bowel contents to leak out of your colon and into your abdomen  If this happens, you will need to stay in the hospital and have surgery on your colon  Seek care immediately if:   · You have a large amount of bright red blood in your bowel movements  · Your abdomen is hard and firm and you have severe pain  · You have sudden trouble breathing  Contact your healthcare provider if:   · You develop a rash or hives  · You have a fever within 24 hours of your procedure  · You have not had a bowel movement for 3 days after your procedure  · You have questions or concerns about your condition or care  Activity:   · Do not lift, strain, or run  for 3 days after your procedure  · Rest after your procedure  You have been given medicine to relax you  Do not  drive or make important decisions until the day after your procedure  Return to your normal activity as directed  · Relieve gas and discomfort from bloating  by lying on your right side with a heating pad on your abdomen  You may need to take short walks to help the gas move out  Eat small meals until bloating is relieved  If you had polyps removed: For 7 days after your procedure:  · Do not  take aspirin  · Do not  go on long car rides  Help prevent constipation:   · Eat a variety of healthy foods    Healthy foods include fruit, vegetables, whole-grain breads, low-fat dairy products, beans, lean meat, and fish  Ask if you need to be on a special diet  Your healthcare provider may recommend that you eat high-fiber foods such as cooked beans  Fiber helps you have regular bowel movements  · Drink liquids as directed  Adults should drink between 9 and 13 eight-ounce cups of liquid every day  Ask what amount is best for you  For most people, good liquids to drink are water, juice, and milk  · Exercise as directed  Talk to your healthcare provider about the best exercise plan for you  Exercise can help prevent constipation, decrease your blood pressure and improve your health  Follow up with your healthcare provider as directed:  Write down your questions so you remember to ask them during your visits  © 2017 [employer] Information is for End User's use only and may not be sold, redistributed or otherwise used for commercial purposes  All illustrations and images included in CareNotes® are the copyrighted property of [employer]  or Gian Matias  The above information is an  only  It is not intended as medical advice for individual conditions or treatments  Talk to your doctor, nurse or pharmacist before following any medical regimen to see if it is safe and effective for you

## 2018-12-14 NOTE — OP NOTE
OPERATIVE REPORT  PATIENT NAME: Lucero Grimaldo    :  1970  MRN: [id number]  Pt Location: [facility] 01    SURGERY DATE: 2018    Surgeon(s) and Role:     Cesia Rea MD - Primary    Preop Diagnosis:  Epigastric pain [R10 13]  Nausea with vomiting [R11 2]  Change in bowel habits [R19 4]    Post-Op Diagnosis Codes:     * Epigastric pain [R10 13]     * Nausea with vomiting [R11 2]     * Change in bowel habits [R19 4]    Procedure(s) (LRB):  ESOPHAGOGASTRODUODENOSCOPY (EGD) (N/A)  COLONOSCOPY (N/A)    Specimen(s):  * No specimens in log *    Estimated Blood Loss:   Minimal    Drains:       Anesthesia Type:   IV Sedation with Anesthesia    Operative Indications:  Epigastric pain [R10 13]  Nausea with vomiting [R11 2]  Change in bowel habits [R19 4]      Operative Findings:  Diffuse gastritis, colon polyps, significant amounts required for sedation      Complications:   None    Procedure and Technique: The upper endoscope was passed under direct visualization the esophagus stomach and duodenum  Duodenum was normal   In the stomach there is coffee-ground material and some slight red flecks of blood throughout the body of the stomach all of which were suctioned with some fluid  The antrum is normal   The body of stomach reveals diffuse edema congestion and mild erythema  Biopsies taken to RO H pylori  The fundus and cardia stomach were normal   The esophagus was normal     The patient was repositioned and the colonoscope introduced and passed to the cecum the ileocecal valve was seen  The preparation is very good  In the mid transverse colon a 0 3 cm polyp was cold snared and suctioned  A similar appearing polyp was seen in the mid left colon which was removed with cold snare and suctioned  Mucosa throughout the remainder of the colon is normal    We will arrange follow-up in the office and I have suggested repeat colonoscopy in 5 years       I was present for the entire procedure    Patient Disposition:  hemodynamically stable    SIGNATURE: Yessica Medina MD  DATE: December 14, 2018  TIME: 11:08 AM

## 2018-12-14 NOTE — ANESTHESIA PREPROCEDURE EVALUATION
Review of Systems/Medical History  Patient summary reviewed  Chart reviewed      Cardiovascular  Negative cardio ROS Hypertension ,    Pulmonary  Asthma , well controlled/ stable ,        GI/Hepatic    GERD well controlled, Liver disease ,        Chronic kidney disease ,        Endo/Other  Diabetes well controlled ,      GYN       Hematology  Anemia ,     Musculoskeletal  Back pain ,   Comment: Lower extremity weakness      Neurology      Comment: Cataplexy  Small fiber neuropathy Psychology   Anxiety, Depression , bipolar disorder,     Comment: Conversion disorder           Physical Exam    Airway    Mallampati score: II  TM Distance: >3 FB  Neck ROM: full     Dental   No notable dental hx     Cardiovascular  Comment: Negative ROS, Rhythm: regular, Rate: normal, Cardiovascular exam normal    Pulmonary  Pulmonary exam normal Breath sounds clear to auscultation,     Other Findings        Anesthesia Plan  ASA Score- 3     Anesthesia Type- IV sedation with anesthesia with ASA Monitors  Additional Monitors:   Airway Plan:         Plan Factors-    Induction-     Postoperative Plan-     Informed Consent- Anesthetic plan and risks discussed with patient

## 2018-12-14 NOTE — ANESTHESIA POSTPROCEDURE EVALUATION
Post-Op Assessment Note      CV Status:  Stable    Mental Status:  Alert and awake    Hydration Status:  Euvolemic    PONV Controlled:  Controlled    Airway Patency:  Patent    Post Op Vitals Reviewed: Yes          Staff: Anesthesiologist           /59 (12/14/18 1158)    Temp      Pulse 90 (12/14/18 1158)   Resp 14 (12/14/18 1158)    SpO2 98 % (12/14/18 1158)

## 2018-12-27 DIAGNOSIS — B07.0 PLANTAR WART, RIGHT FOOT: ICD-10-CM

## 2018-12-27 DIAGNOSIS — F31.9 BIPOLAR 1 DISORDER (HCC): ICD-10-CM

## 2018-12-28 ENCOUNTER — TELEPHONE (OUTPATIENT)
Dept: FAMILY MEDICINE CLINIC | Facility: CLINIC | Age: 48
End: 2018-12-28

## 2018-12-28 DIAGNOSIS — R91.1 LUNG NODULE: Primary | ICD-10-CM

## 2018-12-28 NOTE — TELEPHONE ENCOUNTER
----- Message from Justine Todd PA-C sent at 12/28/2018  9:20 AM EST -----  Patient has CT of the chest completed on 07/24/2017  A 2 mm lung nodule was noted  Because of patient's history of smoking, was recommended that she had a follow-up CT completed in 1 year  Order is placed in the system  Patient can call [facility] scheduling to make an appointment for the CT of the chest for further evaluation

## 2018-12-31 RX ORDER — QUETIAPINE FUMARATE 100 MG/1
100 TABLET, FILM COATED ORAL
Qty: 90 TABLET | Refills: 0 | Status: SHIPPED | OUTPATIENT
Start: 2018-12-31 | End: 2019-05-06 | Stop reason: SDUPTHER

## 2019-01-07 DIAGNOSIS — G89.4 CHRONIC PAIN SYNDROME: ICD-10-CM

## 2019-01-07 DIAGNOSIS — M31.30 WEGENER'S GRANULOMATOSIS: Chronic | ICD-10-CM

## 2019-01-07 DIAGNOSIS — F31.9 BIPOLAR 1 DISORDER (HCC): ICD-10-CM

## 2019-01-08 ENCOUNTER — TELEPHONE (OUTPATIENT)
Dept: FAMILY MEDICINE CLINIC | Facility: CLINIC | Age: 49
End: 2019-01-08

## 2019-01-08 RX ORDER — OXYCODONE AND ACETAMINOPHEN 7.5; 325 MG/1; MG/1
1 TABLET ORAL EVERY 6 HOURS PRN
Qty: 120 TABLET | Refills: 0 | Status: SHIPPED | OUTPATIENT
Start: 2019-01-08 | End: 2019-02-06 | Stop reason: SDUPTHER

## 2019-01-08 RX ORDER — DEXTROAMPHETAMINE SACCHARATE, AMPHETAMINE ASPARTATE MONOHYDRATE, DEXTROAMPHETAMINE SULFATE AND AMPHETAMINE SULFATE 5; 5; 5; 5 MG/1; MG/1; MG/1; MG/1
20 CAPSULE, EXTENDED RELEASE ORAL 2 TIMES DAILY
Qty: 60 CAPSULE | Refills: 0 | Status: SHIPPED | OUTPATIENT
Start: 2019-01-08 | End: 2019-02-06 | Stop reason: SDUPTHER

## 2019-01-08 NOTE — TELEPHONE ENCOUNTER
Pt says the ins won't cover the mattress and no ins will cover only a mattress  [facility] told pt that the new script has to say semi-electric hospital bed  You will need to have her height, weight, dx  and all chart notes to be faxed to [phone]

## 2019-01-09 DIAGNOSIS — R29.898 WEAKNESS OF BOTH LOWER EXTREMITIES: ICD-10-CM

## 2019-01-09 DIAGNOSIS — G62.9 SMALL FIBER NEUROPATHY: ICD-10-CM

## 2019-01-09 DIAGNOSIS — N18.30 CKD (CHRONIC KIDNEY DISEASE) STAGE 3, GFR 30-59 ML/MIN (HCC): Chronic | ICD-10-CM

## 2019-01-09 DIAGNOSIS — G47.411 CATAPLEXY: ICD-10-CM

## 2019-01-09 DIAGNOSIS — M31.30 WEGENER'S GRANULOMATOSIS: Primary | Chronic | ICD-10-CM

## 2019-01-10 ENCOUNTER — TELEPHONE (OUTPATIENT)
Dept: FAMILY MEDICINE CLINIC | Facility: CLINIC | Age: 49
End: 2019-01-10

## 2019-01-10 DIAGNOSIS — IMO0001 IDDM (INSULIN DEPENDENT DIABETES MELLITUS): Chronic | ICD-10-CM

## 2019-01-15 DIAGNOSIS — R10.13 EPIGASTRIC PAIN: ICD-10-CM

## 2019-01-15 DIAGNOSIS — M31.30 WEGENER'S GRANULOMATOSIS: Chronic | ICD-10-CM

## 2019-01-15 DIAGNOSIS — G47.411 CATAPLEXY: Primary | ICD-10-CM

## 2019-01-15 DIAGNOSIS — N18.30 CKD (CHRONIC KIDNEY DISEASE) STAGE 3, GFR 30-59 ML/MIN (HCC): Chronic | ICD-10-CM

## 2019-01-15 DIAGNOSIS — M25.50 ARTHRALGIA OF MULTIPLE JOINTS: ICD-10-CM

## 2019-01-15 DIAGNOSIS — N18.30 CKD (CHRONIC KIDNEY DISEASE) STAGE 3, GFR 30-59 ML/MIN (HCC): Primary | ICD-10-CM

## 2019-01-15 RX ORDER — AMLODIPINE BESYLATE 5 MG/1
5 TABLET ORAL DAILY
Qty: 90 TABLET | Refills: 3 | Status: SHIPPED | OUTPATIENT
Start: 2019-01-15 | End: 2020-02-28 | Stop reason: SDUPTHER

## 2019-01-16 RX ORDER — FAMOTIDINE 20 MG/1
20 TABLET, FILM COATED ORAL 2 TIMES DAILY
Qty: 180 TABLET | Refills: 1 | Status: SHIPPED | OUTPATIENT
Start: 2019-01-16 | End: 2019-07-10 | Stop reason: SDUPTHER

## 2019-02-01 ENCOUNTER — APPOINTMENT (EMERGENCY)
Dept: CT IMAGING | Facility: HOSPITAL | Age: 49
End: 2019-02-01
Payer: COMMERCIAL

## 2019-02-01 ENCOUNTER — HOSPITAL ENCOUNTER (OUTPATIENT)
Facility: HOSPITAL | Age: 49
Setting detail: OBSERVATION
Discharge: HOME | End: 2019-02-02
Attending: EMERGENCY MEDICINE | Admitting: INTERNAL MEDICINE
Payer: COMMERCIAL

## 2019-02-01 DIAGNOSIS — Z32.01 POSITIVE PREGNANCY TEST: ICD-10-CM

## 2019-02-01 DIAGNOSIS — K29.70 GASTRITIS: Primary | ICD-10-CM

## 2019-02-01 DIAGNOSIS — R11.2 INTRACTABLE NAUSEA AND VOMITING: ICD-10-CM

## 2019-02-01 DIAGNOSIS — N18.4 CKD (CHRONIC KIDNEY DISEASE) STAGE 4, GFR 15-29 ML/MIN (HCC): ICD-10-CM

## 2019-02-01 PROBLEM — D72.829 LEUKOCYTOSIS: Status: ACTIVE | Noted: 2019-02-01

## 2019-02-01 PROBLEM — K58.9 IBS (IRRITABLE BOWEL SYNDROME): Status: ACTIVE | Noted: 2019-02-01

## 2019-02-01 LAB
ALBUMIN SERPL BCP-MCNC: 4.2 G/DL (ref 3.5–5)
ALP SERPL-CCNC: 128 U/L (ref 46–116)
ALT SERPL W P-5'-P-CCNC: 33 U/L (ref 12–78)
AMPHETAMINES SERPL QL SCN: NEGATIVE
ANION GAP SERPL CALCULATED.3IONS-SCNC: 14 MMOL/L (ref 4–13)
AST SERPL W P-5'-P-CCNC: 22 U/L (ref 5–45)
B-HCG SERPL-ACNC: 7.6 MIU/ML
BACTERIA UR QL AUTO: ABNORMAL /HPF
BARBITURATES UR QL: NEGATIVE
BASOPHILS # BLD MANUAL: 0 THOUSAND/UL (ref 0–0.1)
BASOPHILS NFR MAR MANUAL: 0 % (ref 0–1)
BENZODIAZ UR QL: NEGATIVE
BILIRUB DIRECT SERPL-MCNC: 0.07 MG/DL (ref 0–0.2)
BILIRUB SERPL-MCNC: 0.24 MG/DL (ref 0.2–1)
BILIRUB UR QL STRIP: NEGATIVE
BUN SERPL-MCNC: 31 MG/DL (ref 5–25)
CALCIUM SERPL-MCNC: 9.9 MG/DL (ref 8.3–10.1)
CHLORIDE SERPL-SCNC: 103 MMOL/L (ref 100–108)
CLARITY UR: CLEAR
CO2 SERPL-SCNC: 23 MMOL/L (ref 21–32)
COCAINE UR QL: NEGATIVE
COLOR UR: YELLOW
CREAT SERPL-MCNC: 2.16 MG/DL (ref 0.6–1.3)
EOSINOPHIL # BLD MANUAL: 0 THOUSAND/UL (ref 0–0.4)
EOSINOPHIL NFR BLD MANUAL: 0 % (ref 0–6)
ERYTHROCYTE [DISTWIDTH] IN BLOOD BY AUTOMATED COUNT: 13.3 % (ref 11.6–15.1)
GFR SERPL CREATININE-BSD FRML MDRD: 26 ML/MIN/1.73SQ M
GLUCOSE SERPL-MCNC: 148 MG/DL (ref 65–140)
GLUCOSE SERPL-MCNC: 152 MG/DL (ref 65–140)
GLUCOSE SERPL-MCNC: 155 MG/DL (ref 65–140)
GLUCOSE SERPL-MCNC: 168 MG/DL (ref 65–140)
GLUCOSE UR STRIP-MCNC: NEGATIVE MG/DL
HCG SERPL QL: POSITIVE
HCT VFR BLD AUTO: 41.7 % (ref 34.8–46.1)
HGB BLD-MCNC: 14 G/DL (ref 11.5–15.4)
HGB UR QL STRIP.AUTO: ABNORMAL
KETONES UR STRIP-MCNC: NEGATIVE MG/DL
LEUKOCYTE ESTERASE UR QL STRIP: NEGATIVE
LIPASE SERPL-CCNC: 270 U/L (ref 73–393)
LYMPHOCYTES # BLD AUTO: 0.47 THOUSAND/UL (ref 0.6–4.47)
LYMPHOCYTES # BLD AUTO: 3 % (ref 14–44)
MCH RBC QN AUTO: 30.9 PG (ref 26.8–34.3)
MCHC RBC AUTO-ENTMCNC: 33.6 G/DL (ref 31.4–37.4)
MCV RBC AUTO: 92 FL (ref 82–98)
METHADONE UR QL: NEGATIVE
MONOCYTES # BLD AUTO: 0 THOUSAND/UL (ref 0–1.22)
MONOCYTES NFR BLD: 0 % (ref 4–12)
NEUTROPHILS # BLD MANUAL: 15.26 THOUSAND/UL (ref 1.85–7.62)
NEUTS BAND NFR BLD MANUAL: 15 % (ref 0–8)
NEUTS SEG NFR BLD AUTO: 82 % (ref 43–75)
NITRITE UR QL STRIP: NEGATIVE
NON-SQ EPI CELLS URNS QL MICRO: ABNORMAL /HPF
NRBC BLD AUTO-RTO: 0 /100 WBCS
OPIATES UR QL SCN: NEGATIVE
PCP UR QL: NEGATIVE
PH UR STRIP.AUTO: 6.5 [PH] (ref 4.5–8)
PLATELET # BLD AUTO: 303 THOUSANDS/UL (ref 149–390)
PLATELET BLD QL SMEAR: ADEQUATE
PMV BLD AUTO: 10.2 FL (ref 8.9–12.7)
POTASSIUM SERPL-SCNC: 4 MMOL/L (ref 3.5–5.3)
PROT SERPL-MCNC: 9 G/DL (ref 6.4–8.2)
PROT UR STRIP-MCNC: >=300 MG/DL
RBC # BLD AUTO: 4.53 MILLION/UL (ref 3.81–5.12)
RBC #/AREA URNS AUTO: ABNORMAL /HPF
RBC MORPH BLD: NORMAL
SODIUM SERPL-SCNC: 140 MMOL/L (ref 136–145)
SP GR UR STRIP.AUTO: 1.02 (ref 1–1.03)
THC UR QL: POSITIVE
TOTAL CELLS COUNTED SPEC: 100
TROPONIN I SERPL-MCNC: <0.02 NG/ML
UROBILINOGEN UR QL STRIP.AUTO: 0.2 E.U./DL
WBC # BLD AUTO: 15.73 THOUSAND/UL (ref 4.31–10.16)
WBC #/AREA URNS AUTO: ABNORMAL /HPF

## 2019-02-01 PROCEDURE — 99285 EMERGENCY DEPT VISIT HI MDM: CPT

## 2019-02-01 PROCEDURE — 36415 COLL VENOUS BLD VENIPUNCTURE: CPT | Performed by: EMERGENCY MEDICINE

## 2019-02-01 PROCEDURE — 81025 URINE PREGNANCY TEST: CPT | Performed by: EMERGENCY MEDICINE

## 2019-02-01 PROCEDURE — 84703 CHORIONIC GONADOTROPIN ASSAY: CPT | Performed by: EMERGENCY MEDICINE

## 2019-02-01 PROCEDURE — 84484 ASSAY OF TROPONIN QUANT: CPT | Performed by: EMERGENCY MEDICINE

## 2019-02-01 PROCEDURE — 82948 REAGENT STRIP/BLOOD GLUCOSE: CPT

## 2019-02-01 PROCEDURE — C9113 INJ PANTOPRAZOLE SODIUM, VIA: HCPCS | Performed by: EMERGENCY MEDICINE

## 2019-02-01 PROCEDURE — 80307 DRUG TEST PRSMV CHEM ANLYZR: CPT | Performed by: PHYSICIAN ASSISTANT

## 2019-02-01 PROCEDURE — 85007 BL SMEAR W/DIFF WBC COUNT: CPT | Performed by: EMERGENCY MEDICINE

## 2019-02-01 PROCEDURE — 74176 CT ABD & PELVIS W/O CONTRAST: CPT

## 2019-02-01 PROCEDURE — 96374 THER/PROPH/DIAG INJ IV PUSH: CPT

## 2019-02-01 PROCEDURE — 84702 CHORIONIC GONADOTROPIN TEST: CPT | Performed by: EMERGENCY MEDICINE

## 2019-02-01 PROCEDURE — 81001 URINALYSIS AUTO W/SCOPE: CPT | Performed by: PHYSICIAN ASSISTANT

## 2019-02-01 PROCEDURE — 96361 HYDRATE IV INFUSION ADD-ON: CPT

## 2019-02-01 PROCEDURE — 80076 HEPATIC FUNCTION PANEL: CPT | Performed by: EMERGENCY MEDICINE

## 2019-02-01 PROCEDURE — 85027 COMPLETE CBC AUTOMATED: CPT | Performed by: EMERGENCY MEDICINE

## 2019-02-01 PROCEDURE — 99220 PR INITIAL OBSERVATION CARE/DAY 70 MINUTES: CPT | Performed by: INTERNAL MEDICINE

## 2019-02-01 PROCEDURE — 83690 ASSAY OF LIPASE: CPT | Performed by: EMERGENCY MEDICINE

## 2019-02-01 PROCEDURE — 80048 BASIC METABOLIC PNL TOTAL CA: CPT | Performed by: EMERGENCY MEDICINE

## 2019-02-01 PROCEDURE — 96375 TX/PRO/DX INJ NEW DRUG ADDON: CPT

## 2019-02-01 PROCEDURE — 96376 TX/PRO/DX INJ SAME DRUG ADON: CPT

## 2019-02-01 RX ORDER — FENTANYL CITRATE 50 UG/ML
50 INJECTION, SOLUTION INTRAMUSCULAR; INTRAVENOUS ONCE
Status: COMPLETED | OUTPATIENT
Start: 2019-02-01 | End: 2019-02-01

## 2019-02-01 RX ORDER — OXYCODONE HYDROCHLORIDE AND ACETAMINOPHEN 5; 325 MG/1; MG/1
1 TABLET ORAL EVERY 4 HOURS PRN
Status: DISCONTINUED | OUTPATIENT
Start: 2019-02-01 | End: 2019-02-02 | Stop reason: HOSPADM

## 2019-02-01 RX ORDER — HYDRALAZINE HYDROCHLORIDE 20 MG/ML
5 INJECTION INTRAMUSCULAR; INTRAVENOUS EVERY 6 HOURS PRN
Status: DISCONTINUED | OUTPATIENT
Start: 2019-02-01 | End: 2019-02-02 | Stop reason: HOSPADM

## 2019-02-01 RX ORDER — ONDANSETRON 2 MG/ML
4 INJECTION INTRAMUSCULAR; INTRAVENOUS ONCE
Status: COMPLETED | OUTPATIENT
Start: 2019-02-01 | End: 2019-02-01

## 2019-02-01 RX ORDER — GABAPENTIN 300 MG/1
300 CAPSULE ORAL 3 TIMES DAILY
Status: DISCONTINUED | OUTPATIENT
Start: 2019-02-01 | End: 2019-02-02 | Stop reason: HOSPADM

## 2019-02-01 RX ORDER — ACETAMINOPHEN 325 MG/1
650 TABLET ORAL EVERY 6 HOURS PRN
Status: DISCONTINUED | OUTPATIENT
Start: 2019-02-01 | End: 2019-02-02 | Stop reason: HOSPADM

## 2019-02-01 RX ORDER — DICYCLOMINE HCL 20 MG
20 TABLET ORAL EVERY 6 HOURS PRN
Status: DISCONTINUED | OUTPATIENT
Start: 2019-02-01 | End: 2019-02-02 | Stop reason: HOSPADM

## 2019-02-01 RX ORDER — PANTOPRAZOLE SODIUM 40 MG/1
40 INJECTION, POWDER, FOR SOLUTION INTRAVENOUS ONCE
Status: COMPLETED | OUTPATIENT
Start: 2019-02-01 | End: 2019-02-01

## 2019-02-01 RX ORDER — ALBUTEROL SULFATE 90 UG/1
2 AEROSOL, METERED RESPIRATORY (INHALATION) EVERY 6 HOURS PRN
Status: DISCONTINUED | OUTPATIENT
Start: 2019-02-01 | End: 2019-02-02 | Stop reason: HOSPADM

## 2019-02-01 RX ORDER — METOCLOPRAMIDE HYDROCHLORIDE 5 MG/ML
10 INJECTION INTRAMUSCULAR; INTRAVENOUS ONCE
Status: COMPLETED | OUTPATIENT
Start: 2019-02-01 | End: 2019-02-01

## 2019-02-01 RX ORDER — SIMETHICONE 80 MG
125 TABLET,CHEWABLE ORAL EVERY 6 HOURS PRN
Status: DISCONTINUED | OUTPATIENT
Start: 2019-02-01 | End: 2019-02-02 | Stop reason: HOSPADM

## 2019-02-01 RX ORDER — TIZANIDINE 4 MG/1
4 TABLET ORAL 3 TIMES DAILY
Status: DISCONTINUED | OUTPATIENT
Start: 2019-02-01 | End: 2019-02-02 | Stop reason: HOSPADM

## 2019-02-01 RX ORDER — PANTOPRAZOLE SODIUM 40 MG/1
40 TABLET, DELAYED RELEASE ORAL
Status: DISCONTINUED | OUTPATIENT
Start: 2019-02-02 | End: 2019-02-02 | Stop reason: HOSPADM

## 2019-02-01 RX ORDER — LORAZEPAM 2 MG/ML
1 INJECTION INTRAMUSCULAR ONCE
Status: COMPLETED | OUTPATIENT
Start: 2019-02-01 | End: 2019-02-01

## 2019-02-01 RX ORDER — ONDANSETRON 2 MG/ML
4 INJECTION INTRAMUSCULAR; INTRAVENOUS EVERY 6 HOURS PRN
Status: DISCONTINUED | OUTPATIENT
Start: 2019-02-01 | End: 2019-02-02 | Stop reason: HOSPADM

## 2019-02-01 RX ORDER — QUETIAPINE FUMARATE 100 MG/1
100 TABLET, FILM COATED ORAL
Status: DISCONTINUED | OUTPATIENT
Start: 2019-02-01 | End: 2019-02-02 | Stop reason: HOSPADM

## 2019-02-01 RX ORDER — MAGNESIUM HYDROXIDE/ALUMINUM HYDROXICE/SIMETHICONE 120; 1200; 1200 MG/30ML; MG/30ML; MG/30ML
30 SUSPENSION ORAL ONCE
Status: COMPLETED | OUTPATIENT
Start: 2019-02-01 | End: 2019-02-01

## 2019-02-01 RX ORDER — SODIUM CHLORIDE 9 MG/ML
100 INJECTION, SOLUTION INTRAVENOUS CONTINUOUS
Status: DISCONTINUED | OUTPATIENT
Start: 2019-02-01 | End: 2019-02-02 | Stop reason: HOSPADM

## 2019-02-01 RX ORDER — HEPARIN SODIUM 5000 [USP'U]/ML
5000 INJECTION, SOLUTION INTRAVENOUS; SUBCUTANEOUS EVERY 8 HOURS SCHEDULED
Status: DISCONTINUED | OUTPATIENT
Start: 2019-02-01 | End: 2019-02-02 | Stop reason: HOSPADM

## 2019-02-01 RX ORDER — HYDROMORPHONE HCL/PF 1 MG/ML
0.5 SYRINGE (ML) INJECTION EVERY 4 HOURS PRN
Status: DISCONTINUED | OUTPATIENT
Start: 2019-02-01 | End: 2019-02-02 | Stop reason: HOSPADM

## 2019-02-01 RX ORDER — AMLODIPINE BESYLATE 5 MG/1
5 TABLET ORAL DAILY
Status: DISCONTINUED | OUTPATIENT
Start: 2019-02-02 | End: 2019-02-02 | Stop reason: HOSPADM

## 2019-02-01 RX ADMIN — TIZANIDINE 4 MG: 4 TABLET ORAL at 21:29

## 2019-02-01 RX ADMIN — QUETIAPINE FUMARATE 100 MG: 100 TABLET ORAL at 21:29

## 2019-02-01 RX ADMIN — PANTOPRAZOLE SODIUM 40 MG: 40 INJECTION, POWDER, FOR SOLUTION INTRAVENOUS at 10:43

## 2019-02-01 RX ADMIN — METOCLOPRAMIDE 10 MG: 5 INJECTION, SOLUTION INTRAMUSCULAR; INTRAVENOUS at 17:04

## 2019-02-01 RX ADMIN — METOCLOPRAMIDE 10 MG: 5 INJECTION, SOLUTION INTRAMUSCULAR; INTRAVENOUS at 12:13

## 2019-02-01 RX ADMIN — ALUMINUM HYDROXIDE, MAGNESIUM HYDROXIDE, AND SIMETHICONE 30 ML: 200; 200; 20 SUSPENSION ORAL at 14:36

## 2019-02-01 RX ADMIN — SODIUM CHLORIDE 1000 ML: 0.9 INJECTION, SOLUTION INTRAVENOUS at 13:27

## 2019-02-01 RX ADMIN — LORAZEPAM 1 MG: 2 INJECTION INTRAMUSCULAR; INTRAVENOUS at 13:39

## 2019-02-01 RX ADMIN — INSULIN LISPRO 1 UNITS: 100 INJECTION, SOLUTION INTRAVENOUS; SUBCUTANEOUS at 21:34

## 2019-02-01 RX ADMIN — HYDROMORPHONE HYDROCHLORIDE 0.5 MG: 1 INJECTION, SOLUTION INTRAMUSCULAR; INTRAVENOUS; SUBCUTANEOUS at 21:35

## 2019-02-01 RX ADMIN — ONDANSETRON 4 MG: 2 INJECTION INTRAMUSCULAR; INTRAVENOUS at 10:43

## 2019-02-01 RX ADMIN — LIDOCAINE HYDROCHLORIDE 10 ML: 20 SOLUTION ORAL; TOPICAL at 14:36

## 2019-02-01 RX ADMIN — GABAPENTIN 300 MG: 300 CAPSULE ORAL at 21:29

## 2019-02-01 RX ADMIN — HEPARIN SODIUM 5000 UNITS: 5000 INJECTION INTRAVENOUS; SUBCUTANEOUS at 21:29

## 2019-02-01 RX ADMIN — SODIUM CHLORIDE 1000 ML: 0.9 INJECTION, SOLUTION INTRAVENOUS at 10:41

## 2019-02-01 RX ADMIN — LORAZEPAM 1 MG: 2 INJECTION INTRAMUSCULAR; INTRAVENOUS at 15:28

## 2019-02-01 RX ADMIN — ONDANSETRON 4 MG: 2 INJECTION INTRAMUSCULAR; INTRAVENOUS at 13:23

## 2019-02-01 RX ADMIN — SODIUM CHLORIDE 100 ML/HR: 0.9 INJECTION, SOLUTION INTRAVENOUS at 17:12

## 2019-02-01 RX ADMIN — FENTANYL CITRATE 50 MCG: 50 INJECTION, SOLUTION INTRAMUSCULAR; INTRAVENOUS at 10:41

## 2019-02-01 RX ADMIN — HYDROMORPHONE HYDROCHLORIDE 0.5 MG: 1 INJECTION, SOLUTION INTRAMUSCULAR; INTRAVENOUS; SUBCUTANEOUS at 17:47

## 2019-02-01 NOTE — ASSESSMENT & PLAN NOTE
· Patient admits to chronic pain on chronic pain regimen  · PDMP reviewed  · Continue home pain regimen

## 2019-02-01 NOTE — ASSESSMENT & PLAN NOTE
· POA with primarily epigastric abdominal pain that started last evening with nausea, vomiting and diarrhea  · Patient does note pain started after eating ziti and a glass of milk   · CT abdomen:  No acute pathology, no urolithiasis or obstructive uropathy, colonic diverticulosis, stable left paraovarian cyst  · Recent EGD/colonoscopy outpatient on 12/14/2018:  Gastritis, negative H pylori, polyps removed in colon   · Ddx: gastritis, vs viral gastroenteritis, gastroparesis related, IBS   · Alk-phos elevated 128, normal lipase  · Patient's symptoms have since resolved, she was able to tolerate oral intake without difficulty, she denies any further episodes of diarrhea, she denies any abdominal pain  · Patient recommended continue famotidine  · Continue bentyl, mylicon per home regimen  · She was encouraged to refrain from marijuana use  · Encouraged to continue non ulcerogenic diet and lactose-free diet  · Patient did have false positive pregnancy test upon admission, has been in menopause since 2015 without any bleeding since, denies any sexual activity for 1 year, was seen by OBGYN- false-positive 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

## 2019-02-01 NOTE — ED PROVIDER NOTES
History  Chief Complaint   Patient presents with    Abdominal Pain     Reports sudden onset approx 0130 of nausea, vomitting, diffuse abdominal pain, and diarrhea  Patient presents with care giver     50year-old female presenting for evaluation of abdominal pain, N/V/D  Reports the symptoms started around 1:30 a m , 9 hours ago  She reports symptoms started spontaneously, she reports diffuse abdominal pain with radiation to her back  Pt is rolling around in the bed and appears uncomfortable  She reports numerous episodes of nonbloody nonbilious emesis as well as numerous episodes of loose/nonbloody stools  Reports that the last thing she ate was pasta for dinner last night  She did not eat anything today and did not take her morning medications  Reports this feels similar to gastritis she has had in the past   Per review of records, patient follows with GI and saw them about 1 month ago and was started on Protonix and Bentyl p r n , there was question about possible diabetic gastroparesis or IBS  Patient had an EGD/colonoscopy on 12/14/18 showing gastritis, H pylori was negative  She does have a history of pancreatitis in the past, but this was felt to be from Bactrim  She denies any fevers, chills, cough/URI symptoms, CP, SOB, urinary complaints/hematuria  No history of abdominal surgeries  Post-menopausal     A/P:  77-year-old female with abdominal pain, will treat symptoms, CBC to assess for leukocytosis/anemia, BMP to assess renal function/electrolytes, lipase to rule out pancreatitis, hepatic function panel, UA to rule out urine infection, CT A/P to assess for intra-abdominal pathology, reassess, dispo accordingly           Per records:  12/14/18 EGD/Colonoscopy  Procedure and Technique: The upper endoscope was passed under direct visualization the esophagus stomach and duodenum    Duodenum was normal   In the stomach there is coffee-ground material and some slight red flecks of blood throughout the body of the stomach all of which were suctioned with some fluid  The antrum is normal   The body of stomach reveals diffuse edema congestion and mild erythema  Biopsies taken to RO H pylori  The fundus and cardia stomach were normal   The esophagus was normal      The patient was repositioned and the colonoscope introduced and passed to the cecum the ileocecal valve was seen  The preparation is very good  In the mid transverse colon a 0 3 cm polyp was cold snared and suctioned  A similar appearing polyp was seen in the mid left colon which was removed with cold snare and suctioned  Mucosa throughout the remainder of the colon is normal       Prior to Admission Medications   Prescriptions Last Dose Informant Patient Reported? Taking? Elastic Bandages & Supports (THUMB SPLINT/LEFT MEDIUM) MISC   No No   Sig: by Does not apply route daily   Humidifiers (COOL MIST HUMIDIFIER 1 GALLON) MISC  Self No No   Sig: by Does not apply route as needed (shortness of breath)   Insulin Glargine (TOUJEO MAX SOLOSTAR) 300 units/mL CONCETRATED U-300 injection pen   No No   Sig: Inject 22 Units under the skin daily   Patient not taking: Reported on 12/8/2018    Insulin Pen Needle (PEN NEEDLES) 31G X 8 MM MISC   No No   Sig: Inject 1 Stick under the skin 4 (four) times a day (with meals and at bedtime)   Lancets (FREESTYLE) lancets   No No   Sig: by Other route 3 (three) times a day   QUEtiapine (SEROquel) 100 mg tablet   No No   Sig: Take 1 tablet (100 mg total) by mouth daily at bedtime   Salicylic Acid 26 % SOLN   No No   Sig: Apply twice daily to the wart     Patient not taking: Reported on 9/12/2018    TiZANidine (ZANAFLEX) 4 MG capsule   No No   Sig: Take 1 capsule (4 mg total) by mouth 3 (three) times a day   albuterol (VENTOLIN HFA) 90 mcg/act inhaler   No No   Sig: Inhale 2 puffs every 6 (six) hours as needed for wheezing   amLODIPine (NORVASC) 5 mg tablet   No No   Sig: Take 1 tablet (5 mg total) by mouth daily amphetamine-dextroamphetamine (ADDERALL XR) 20 MG 24 hr capsule   No No   Sig: Take 1 capsule (20 mg total) by mouth 2 (two) times a day Max Daily Amount: 40 mg   cycloSPORINE (RESTASIS) 0 05 % ophthalmic emulsion  Self Yes No   Sig: Administer 1 drop to both eyes 2 (two) times a day   dicyclomine (BENTYL) 20 mg tablet   No No   Sig: Take 1 tablet (20 mg total) by mouth every 6 (six) hours as needed (pain)   famotidine (PEPCID) 20 mg tablet   No No   Sig: Take 1 tablet (20 mg total) by mouth 2 (two) times a day for 10 days   famotidine (PEPCID) 20 mg tablet   No No   Sig: Take 1 tablet (20 mg total) by mouth 2 (two) times a day   gabapentin (NEURONTIN) 300 mg capsule   No No   Sig: Take 1 capsule (300 mg total) by mouth 3 (three) times a day   glucose blood (FREESTYLE LITE) test strip   No No   Si each by Other route 3 (three) times a day   insulin aspart (NOVOLOG FLEXPEN) 100 Units/mL injection pen   No No   Sig: Inject 6 Units under the skin 3 (three) times a day with meals   ondansetron (ZOFRAN) 4 mg tablet   No No   Sig: Take 1 tablet (4 mg total) by mouth every 8 (eight) hours as needed for nausea or vomiting   ondansetron (ZOFRAN-ODT) 4 mg disintegrating tablet   No No   Sig: Take 1 tablet (4 mg total) by mouth every 8 (eight) hours as needed for nausea or vomiting   ondansetron (ZOFRAN-ODT) 4 mg disintegrating tablet   No No   Sig: Take 1 tablet (4 mg total) by mouth every 8 (eight) hours as needed for nausea or vomiting   oxyCODONE-acetaminophen (PERCOCET) 7 5-325 MG per tablet   No No   Sig: Take 1 tablet by mouth every 6 (six) hours as needed (pain) Max Daily Amount: 4 tablets   polyethylene glycol (GOLYTELY) 4000 mL solution   No No   Sig: Take 4,000 mL by mouth once for 1 dose   simethicone (MYLICON) 194 MG chewable tablet   No No   Sig: Chew 1 tablet (125 mg total) every 6 (six) hours as needed for flatulence      Facility-Administered Medications: None       Past Medical History:   Diagnosis Date  ADHD     Anemia of chronic disease     Anxiety     Asthma     Asthma     Borderline personality disorder (HCC)     Cataplexy     Chronic abdominal pain     CKD (chronic kidney disease) stage 3, GFR 30-59 ml/min (HCC)     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HCC)     [facility]ushing syndrome ([facility] [address] )     Diabetes mellitus ([facility] )     DVT (deep venous thrombosis) (HCC)     History of acute pancreatitis     felt secondary to Bactrim    HTN (hypertension)     Hypertension     Liver disease     fatty liver    Microscopic polyangiitis (HCC)     Morbid obesity (HCC)     MPA (microscopic polyangiitis) (HCC)     Ovarian cyst     PTSD (post-traumatic stress disorder)     Renal disorder     Self-inflicted injury     self inflicted skin wounds    Wegener's granulomatosis with renal involvement ([place] [address] ) 2015       Past Surgical History:   Procedure Laterality Date    ESOPHAGOGASTRODUODENOSCOPY  09/11/2015    mild antral gastritis    CT COLONOSCOPY FLX DX W/COLLJ SPEC WHEN PFRMD N/A 12/14/2018    Procedure: COLONOSCOPY with polypectomy;  Surgeon: Elisha Bonilla MD;  Location: [facility]; Service: Gastroenterology    CT ESOPHAGOGASTRODUODENOSCOPY TRANSORAL DIAGNOSTIC N/A 12/14/2018    Procedure: ESOPHAGOGASTRODUODENOSCOPY (EGD) with biopsy;  Surgeon: Elisha Bonilla MD;  Location: [facility]; Service: Gastroenterology    RELEASE SCAR CONTRACTURE / GRAFT REPAIRS OF HAND         Family History   Problem Relation Age of Onset    Colon cancer Neg Hx     Drug abuse Neg Hx         mother father    Mental illness Neg Hx         disorder, mother father     I have reviewed and agree with the history as documented  Social History   Substance Use Topics    Smoking status: Former Smoker     Types: Cigarettes     Quit date: 2010    Smokeless tobacco: Former User    Alcohol use No        Review of Systems   Constitutional: Negative for chills, fever and unexpected weight change  HENT: Negative for ear pain, rhinorrhea and sore throat  Eyes: Negative for pain and visual disturbance  Respiratory: Negative for cough and shortness of breath  Cardiovascular: Negative for chest pain and leg swelling  Gastrointestinal: Positive for abdominal pain, diarrhea, nausea and vomiting  Negative for constipation  Endocrine: Negative for polydipsia, polyphagia and polyuria  Genitourinary: Negative for dysuria, frequency, hematuria and urgency  Musculoskeletal: Positive for back pain  Negative for myalgias and neck pain  Skin: Negative for color change and rash  Allergic/Immunologic: Negative for environmental allergies and immunocompromised state  Neurological: Negative for dizziness, weakness, light-headedness, numbness and headaches  Hematological: Negative for adenopathy  Does not bruise/bleed easily  Psychiatric/Behavioral: Negative for agitation and confusion  All other systems reviewed and are negative  Physical Exam  Physical Exam   Constitutional: She is oriented to person, place, and time  She appears well-developed and well-nourished  Rolling around on stretcher, appears uncomfortable   HENT:   Head: Normocephalic and atraumatic  Nose: Nose normal    Mouth/Throat: Oropharynx is clear and moist    Eyes: Conjunctivae and EOM are normal    Neck: Normal range of motion  Neck supple  Cardiovascular: Normal rate, regular rhythm, normal heart sounds and intact distal pulses  Pulmonary/Chest: Effort normal and breath sounds normal  No stridor  No respiratory distress  She has no wheezes  She has no rales  She exhibits no tenderness  Abdominal: Soft  She exhibits no distension  There is tenderness  There is no rebound and no guarding  Abdomen is obese, diffusely tender, no rebound/guarding   Musculoskeletal: She exhibits no edema or deformity  Neurological: She is alert and oriented to person, place, and time  She exhibits normal muscle tone  Coordination normal    Skin: Skin is warm and dry  No rash noted  Psychiatric: She has a normal mood and affect  Judgment and thought content normal    Nursing note and vitals reviewed        Vital Signs  ED Triage Vitals   Temperature Pulse Respirations Blood Pressure SpO2   02/01/19 1025 02/01/19 1025 02/01/19 1025 02/01/19 1025 02/01/19 1025   97 8 °F (36 6 °C) 84 (!) 24 (!) 192/105 98 %      Temp Source Heart Rate Source Patient Position - Orthostatic VS BP Location FiO2 (%)   02/01/19 1025 02/01/19 1239 02/01/19 1025 02/01/19 1025 --   Oral Monitor Lying Right arm       Pain Score       02/01/19 1025       8           Vitals:    02/01/19 1330 02/01/19 1345 02/01/19 1456 02/01/19 1612   BP:   (!) 172/88 165/89   Pulse: 96 90 92 94   Patient Position - Orthostatic VS:   Lying Lying       Visual Acuity      ED Medications  Medications   amLODIPine (NORVASC) tablet 5 mg (not administered)   gabapentin (NEURONTIN) capsule 300 mg (not administered)   QUEtiapine (SEROquel) tablet 100 mg (not administered)   tiZANidine (ZANAFLEX) tablet 4 mg (not administered)   albuterol (PROVENTIL HFA,VENTOLIN HFA) inhaler 2 puff (not administered)   dicyclomine (BENTYL) tablet 20 mg (not administered)   oxyCODONE-acetaminophen (PERCOCET) 5-325 mg per tablet 1 tablet (not administered)   simethicone (MYLICON) chewable tablet 120 mg (not administered)   pantoprazole (PROTONIX) EC tablet 40 mg (not administered)   hydrALAZINE (APRESOLINE) injection 5 mg (not administered)   acetaminophen (TYLENOL) tablet 650 mg (not administered)   sodium chloride 0 9 % infusion (not administered)   heparin (porcine) subcutaneous injection 5,000 Units (not administered)   ondansetron (ZOFRAN) injection 4 mg (not administered)   insulin lispro (HumaLOG) 100 units/mL subcutaneous injection 1-6 Units (not administered)   insulin lispro (HumaLOG) 100 units/mL subcutaneous injection 1-5 Units (not administered)   sodium chloride 0 9 % bolus 1,000 mL (0 mL Intravenous Stopped 2/1/19 1141)   ondansetron (ZOFRAN) injection 4 mg (4 mg Intravenous Given 2/1/19 1043)   pantoprazole (PROTONIX) injection 40 mg (40 mg Intravenous Given 2/1/19 1043)   fentanyl citrate (PF) 100 MCG/2ML 50 mcg (50 mcg Intravenous Given 2/1/19 1041)   metoclopramide (REGLAN) injection 10 mg (10 mg Intravenous Given 2/1/19 1213)   ondansetron (ZOFRAN) injection 4 mg (4 mg Intravenous Given 2/1/19 1323)   LORazepam (ATIVAN) 2 mg/mL injection 1 mg (1 mg Intravenous Given 2/1/19 1339)   sodium chloride 0 9 % bolus 1,000 mL (0 mL Intravenous Stopped 2/1/19 1427)   aluminum-magnesium hydroxide-simethicone (MYLANTA) 200-200-20 mg/5 mL oral suspension 30 mL (30 mL Oral Given 2/1/19 1436)   lidocaine viscous (XYLOCAINE) 2 % mucosal solution 10 mL (10 mL Oral Given 2/1/19 1436)   LORazepam (ATIVAN) 2 mg/mL injection 1 mg (1 mg Intravenous Given 2/1/19 1528)   metoclopramide (REGLAN) injection 10 mg (10 mg Intravenous Given 2/1/19 1704)       Diagnostic Studies  Results Reviewed     Procedure Component Value Units Date/Time    Fingerstick Glucose (POCT) [[id number]]  (Abnormal) Collected:  02/01/19 1446    Lab Status:  Final result Updated:  02/01/19 1450     POC Glucose 155 (H) mg/dl     hCG, quantitative [[id number]]  (Abnormal) Collected:  02/01/19 1043    Lab Status:  Final result Specimen:  Blood from Arm, Right Updated:  02/01/19 1214     HCG, Quant 7 6 (H) mIU/mL     Narrative:          Expected Ranges:     Approximate               Approximate HCG  Gestation age          Concentration ( mIU/mL)  _____________          ______________________   [facility]                      HCG values  0 2-1                       5-50  1-2                           2-3                         100-5000  3-4                         500-36279  4-5                         1000-00308  5-6                         [id number]  6-8                         [id number]  8-12                        50097-897106    Fingerstick Glucose (POCT) [[id number]]  (Abnormal) Collected:  02/01/19 1207 Lab Status:  Final result Updated:  02/01/19 1209     POC Glucose 148 (H) mg/dl     POCT urinalysis dipstick [[id number]]  (Normal) Resulted:  02/01/19 1117    Lab Status:  Edited Result - FINAL Updated:  02/01/19 1123    POCT pregnancy, urine [[id number]]  (Normal) Resulted:  02/01/19 1117    Lab Status:  Edited Result - FINAL Updated:  02/01/19 1123    Lipase [[id number]]  (Normal) Collected:  02/01/19 1043    Lab Status:  Final result Specimen:  Blood from Arm, Right Updated:  02/01/19 1118     Lipase 270 u/L     Hepatic function panel [[id number]]  (Abnormal) Collected:  02/01/19 1043    Lab Status:  Final result Specimen:  Blood from Arm, Right Updated:  02/01/19 1118     Total Bilirubin 0 24 mg/dL      Bilirubin, Direct 0 07 mg/dL      Alkaline Phosphatase 128 (H) U/L      AST 22 U/L      ALT 33 U/L      Total Protein 9 0 (H) g/dL      Albumin 4 2 g/dL     hCG, qualitative pregnancy [[id number]]  (Abnormal) Collected:  02/01/19 1043    Lab Status:  Final result Specimen:  Blood from Arm, Right Updated:  02/01/19 1118     Preg, Serum Positive (A)    Troponin I [[id number]]  (Normal) Collected:  02/01/19 1043    Lab Status:  Final result Specimen:  Blood from Arm, Right Updated:  02/01/19 1111     Troponin I <0 02 ng/mL     CBC and differential [[id number]]  (Abnormal) Collected:  02/01/19 1043    Lab Status:  Final result Specimen:  Blood from Arm, Right Updated:  02/01/19 1110     WBC 15 73 (H) Thousand/uL      RBC 4 53 Million/uL      Hemoglobin 14 0 g/dL      Hematocrit 41 7 %      MCV 92 fL      MCH 30 9 pg      MCHC 33 6 g/dL      RDW 13 3 %      MPV 10 2 fL      Platelets 837 Thousands/uL      nRBC 0 /100 WBCs     Narrative: This is an appended report  These results have been appended to a previously verified report      Basic metabolic panel [[id number]]  (Abnormal) Collected:  02/01/19 1043    Lab Status:  Final result Specimen:  Blood from Arm, Right Updated:  02/01/19 1109     Sodium 140 mmol/L Potassium 4 0 mmol/L      Chloride 103 mmol/L      CO2 23 mmol/L      ANION GAP 14 (H) mmol/L      BUN 31 (H) mg/dL      Creatinine 2 16 (H) mg/dL      Glucose 168 (H) mg/dL      Calcium 9 9 mg/dL      eGFR 26 ml/min/1 73sq m     Narrative:         National Kidney Disease Education Program recommendations are as follows:  GFR calculation is accurate only with a steady state creatinine  Chronic Kidney disease less than 60 ml/min/1 73 sq  meters  Kidney failure less than 15 ml/min/1 73 sq  meters  CT abdomen pelvis wo contrast   ED Interpretation by Maritza Salazar DO (02/01 1427)       IMPRESSION:       No acute pathology  No urolithiasis or obstructive uropathy        Colonic diverticulosis        Stable left paraovarian cyst       Final Result by Vilma Marion MD (02/01 1415)      No acute pathology  No urolithiasis or obstructive uropathy  Colonic diverticulosis  Stable left paraovarian cyst          Workstation performed: [id number]                    Procedures  Procedures       Phone Contacts  ED Phone Contact    ED Course  ED Course as of Feb 01 1707 Fri Feb 01, 2019   1100 WBC: (!) 15 73   1117 Bands Relative: (!) 15   1128 PREGNANCY, SERUM: (!) Positive   1130 Pain much improved  Serum pregnancy came back positive    Patient reports that she is post menopausal there is no chance that she could be pregnant, hCG quant sent    1224 baseline Creatinine: (!) 2 16   1225 Pt actively retching, s/p zofran and reglan, will redose Zofran    1229 Discussed with OB resident who will run this by team, reports this could happen with heterophile antibodies/hormones and no need for follow up, okay for CT, would expect higher levels in cancer    1238 Discussed with pt who still feels nauseous, consent signed for CT in pregnancy per protocol    1342 Discussed with radiologist regarding CT scan    1510 Pt still with N/V/retching after 2 doses of zofran, reglan, ativan and protonix, will admit MDM  Number of Diagnoses or Management Options  Gastritis:   Intractable nausea and vomiting:   Diagnosis management comments: 51 yo F with abdominal pain/N/V, likely gastritis, but unfortunately was intractable to multiple medications given here in ED, admitted for further management       Amount and/or Complexity of Data Reviewed  Clinical lab tests: ordered and reviewed  Tests in the radiology section of CPT®: ordered and reviewed  Tests in the medicine section of CPT®: ordered and reviewed  Review and summarize past medical records: yes  Independent visualization of images, tracings, or specimens: yes        Disposition  Final diagnoses:   Gastritis   Intractable nausea and vomiting     Time reflects when diagnosis was documented in both MDM as applicable and the Disposition within this note     Time User Action Codes Description Comment    2/1/2019  3:17 PM Kadi Beckwith A Add [K29 70] Gastritis     2/1/2019  3:17 PM Desi Gasca A Add [R11 2] Intractable nausea and vomiting     2/1/2019  4:14 PM Scot Frias Add [Z32 01] Positive pregnancy test       ED Disposition     ED Disposition Condition Date/Time Comment    Admit  Fri Feb 1, 2019  3:17 PM Case was discussed with HAJA and the patient's admission status was agreed to be Admission Status: observation status to the service of Dr Mona Bee           Follow-up Information    None         Current Discharge Medication List      CONTINUE these medications which have NOT CHANGED    Details   albuterol (VENTOLIN HFA) 90 mcg/act inhaler Inhale 2 puffs every 6 (six) hours as needed for wheezing  Qty: 18 g, Refills: 1    Associated Diagnoses: Mild intermittent asthma without complication      amLODIPine (NORVASC) 5 mg tablet Take 1 tablet (5 mg total) by mouth daily  Qty: 90 tablet, Refills: 3    Associated Diagnoses: CKD (chronic kidney disease) stage 3, GFR 30-59 ml/min ([facility])      amphetamine-dextroamphetamine (ADDERALL XR) 20 MG 24 hr capsule Take 1 capsule (20 mg total) by mouth 2 (two) times a day Max Daily Amount: 40 mg  Qty: 60 capsule, Refills: 0    Comments: Continuation of therapy, do not refill before 1/10/19  Associated Diagnoses: Bipolar 1 disorder (HCC)      cycloSPORINE (RESTASIS) 0 05 % ophthalmic emulsion Administer 1 drop to both eyes 2 (two) times a day      dicyclomine (BENTYL) 20 mg tablet Take 1 tablet (20 mg total) by mouth every 6 (six) hours as needed (pain)  Qty: 10 tablet, Refills: 0    Associated Diagnoses: Acute gastroenteritis      Elastic Bandages & Supports (THUMB SPLINT/LEFT MEDIUM) MISC by Does not apply route daily  Qty: 1 each, Refills: 0    Associated Diagnoses: Tendinitis of thumb      famotidine (PEPCID) 20 mg tablet Take 1 tablet (20 mg total) by mouth 2 (two) times a day  Qty: 180 tablet, Refills: 1    Associated Diagnoses: Epigastric pain      gabapentin (NEURONTIN) 300 mg capsule Take 1 capsule (300 mg total) by mouth 3 (three) times a day  Qty: 270 capsule, Refills: 1    Associated Diagnoses: Chronic pain syndrome      glucose blood (FREESTYLE LITE) test strip 1 each by Other route 3 (three) times a day  Qty: 100 each, Refills: 5    Associated Diagnoses: IDDM (insulin dependent diabetes mellitus) ([facility])      Humidifiers (COOL MIST HUMIDIFIER 1 GALLON) MISC by Does not apply route as needed (shortness of breath)  Qty: 1 each, Refills: 0    Associated Diagnoses: Difficulty breathing      insulin aspart (NOVOLOG FLEXPEN) 100 Units/mL injection pen Inject 6 Units under the skin 3 (three) times a day with meals  Qty: 5 pen, Refills: 3    Associated Diagnoses: IDDM (insulin dependent diabetes mellitus) ([facility])      Insulin Glargine (TOUJEO MAX SOLOSTAR) 300 units/mL CONCETRATED U-300 injection pen Inject 22 Units under the skin daily  Qty: 3 pen, Refills: 0    Associated Diagnoses: IDDM (insulin dependent diabetes mellitus) ([facility])      Insulin Pen Needle (PEN NEEDLES) 31G X 8 MM MISC Inject 1 Stick under the skin 4 (four) times a day (with meals and at bedtime)  Qty: 100 each, Refills: 6    Associated Diagnoses: IDDM (insulin dependent diabetes mellitus) ([facility])      Lancets (FREESTYLE) lancets by Other route 3 (three) times a day  Qty: 100 each, Refills: 11    Associated Diagnoses: IDDM (insulin dependent diabetes mellitus) ([facility])      ondansetron (ZOFRAN) 4 mg tablet Take 1 tablet (4 mg total) by mouth every 8 (eight) hours as needed for nausea or vomiting  Qty: 30 tablet, Refills: 0    Associated Diagnoses: Nausea      !! ondansetron (ZOFRAN-ODT) 4 mg disintegrating tablet Take 1 tablet (4 mg total) by mouth every 8 (eight) hours as needed for nausea or vomiting  Qty: 30 tablet, Refills: 0    Associated Diagnoses: Epigastric pain      !! ondansetron (ZOFRAN-ODT) 4 mg disintegrating tablet Take 1 tablet (4 mg total) by mouth every 8 (eight) hours as needed for nausea or vomiting  Qty: 30 tablet, Refills: 0    Associated Diagnoses: Acute gastroenteritis      oxyCODONE-acetaminophen (PERCOCET) 7 5-325 MG per tablet Take 1 tablet by mouth every 6 (six) hours as needed (pain) Max Daily Amount: 4 tablets  Qty: 120 tablet, Refills: 0    Comments: Continuation of therapy, do not fill before 1/10/19  Associated Diagnoses: Wegener's granulomatosis ([facility] [address] ); Chronic pain syndrome      polyethylene glycol (GOLYTELY) 4000 mL solution Take 4,000 mL by mouth once for 1 dose  Qty: 4000 mL, Refills: 0    Associated Diagnoses: Epigastric pain; Diarrhea of presumed infectious origin      QUEtiapine (SEROquel) 100 mg tablet Take 1 tablet (100 mg total) by mouth daily at bedtime  Qty: 90 tablet, Refills: 0    Associated Diagnoses: Bipolar 1 disorder ([facility])      Salicylic Acid 26 % SOLN Apply twice daily to the wart    Qty: 1 Bottle, Refills: 5    Associated Diagnoses: Plantar wart, right foot      simethicone (MYLICON) 831 MG chewable tablet Chew 1 tablet (125 mg total) every 6 (six) hours as needed for flatulence  Qty: 40 tablet, Refills: 0 Associated Diagnoses: Bloating      TiZANidine (ZANAFLEX) 4 MG capsule Take 1 capsule (4 mg total) by mouth 3 (three) times a day  Qty: 90 capsule, Refills: 2    Associated Diagnoses: Chronic low back pain with bilateral sciatica, unspecified back pain laterality       ! ! - Potential duplicate medications found  Please discuss with provider  No discharge procedures on file      ED Provider  Electronically Signed by           Martha Armstrong DO  02/01/19 6863

## 2019-02-01 NOTE — H&P
H&P- Bar Paige 1970, 50 y o  female MRN: [id number]  Unit/Bed#: E5 -01 Encounter: [id number]  Primary Care Provider: Salvador Ann PA-C   Date and time admitted to hospital: 2/1/2019 10:22 AM    Abdominal pain   Assessment & Plan    · POA with primarily epigastric abdominal pain that started last evening with nausea, vomiting and diarrhea  · Patient does note pain started after eating ziti and a glass of milk   · CT abdomen:  No acute pathology, no urolithiasis or obstructive uropathy, colonic diverticulosis, stable left paraovarian cyst  · Recent EGD/colonoscopy outpatient on 12/14/2018:  Gastritis, negative H pylori, polyps removed in colon   · Ddx: gastritis, vs viral gastroenteritis, gastroparesis related, IBS   · Alk-phos elevated 128, normal lipase  · Will order PPI, Zofran prn for nausea   · Continue bentyl, mylicon   · Pain control   · IV fluid hydration  · Serial abdominal exams   · Check UA, UDS  · Placed on GI non ulcerogenic diet   · Patient did have positive pregnancy test upon admission, states she in menopausal and has not had a period since 2015, states she is not currently sexually active and denies sexual intercourse for over 1 year, would appreciate OBGYN consult      IBS (irritable bowel syndrome)   Assessment & Plan    · Patient with episodes of 4-5 watery stools since yesterday  · Patient does have a history of diarrhea  · Recently seen by GI thought to have gastroparesis versus IBS  · Will check stool enteric pathogen, C   Diff  · Monitor bowel movements      Leukocytosis   Assessment & Plan    · POA with leukocytosis of WBC 15 73 likely related to dehydration 2/2 nausea/vomiting/diarrhea  · Will check stool sample  · Monitor WBC, fever curve, hemodynamics     HTN (hypertension)   Assessment & Plan    · Accelerated hypertension on admission  · Continue Norvasc 5mg po oral daily   · Hydralazine prn       Type 2 diabetes mellitus [facility])   Assessment & Plan    Lab Results Component Value Date    HGBA1C 6 1 07/26/2018       Recent Labs      02/01/19   1207  02/01/19   1446   POCGLU  148*  155*       Blood Sugar Average: Last 72 hrs:  (P) 151 5   · Will obtain updated A1c  · Patient states she only takes insulin as needed  · Will place on sliding scale insulin, Accu-Cheks, monitor closely and adjust insulin regimen as needed  · Hypoglycemic protocol on board     CKD (chronic kidney disease) stage 4, GFR 15-29 ml/min ([facility])   Assessment & Plan    · Per chart review creatinine baseline 1 7-2 1   · Follows with Dr Sara High outpatient   · Cr 2 16 POA   · Continue to monitor renal function      [facility]egener's [facility]omatosis [facility]   Assessment & Plan    · Hx of Wegeners granulomatosis      Asthma   Assessment & Plan    · Without acute exacerbation  · Albuterol p r n  Chronic pain   Assessment & Plan    · Patient admits to chronic pain on chronic pain regimen  · PDMP reviewed  · Continue home pain regimen     Bipolar 1 disorder ([facility] )   Assessment & Plan    · Continue Seroquel     Small fiber neuropathy   Assessment & Plan    · Hx of small fiber neuropathy, recent muscle biopsy by Podiatry outpatient      Cataplexy   Assessment & Plan    · Hx cataplexy, follows with Neurology outpatient  · Patient uses wheelchair , as well as has a hospital bed at home and a lift chair as well as home health aides       VTE Prophylaxis: Heparin  / sequential compression device   Code Status: level 1 full code   POLST: There is no POLST form on file for this patient (pre-hospital)  Discussion with family: patient     Anticipated Length of Stay:  Patient will be admitted on an Observation basis with an anticipated length of stay of  < 2 midnights  Justification for Hospital Stay: abdominal pain     Total Time for Visit, including Counseling / Coordination of Care: 45 minutes  Greater than 50% of this total time spent on direct patient counseling and coordination of care      Chief Complaint:   Abdominal pain with nausea vomiting and diarrhea since last evening    History of Present Illness:    Reny Cote is a 50 y o  female with complex past medical history of small fiber neuropathy, Justa's granulomatosis, hypertension, type 2 diabetes, chronic kidney disease, history of gastritis, chronic pain, asthma, history of cataplexy who presents with epigastric abdominal pain that started last evening  Patient admits to associated nausea, non bilious, non bloody vomiting as well as 4-5 episodes of watery stool  Patient states that she had ziti for dinner and a glass of milk and then symptoms started that evening  She admits to similar symptoms in the past for which she was found to have gastritis  Patient recently followed up with Gastroenterology with EGD/colonoscopy on 12/14/2018 which revealed gastritis, negative for H pylori, polyps were removed in the colon  She was suspected at that time to have gastroparesis versus irritable bowel syndrome causing abdominal pain  Patient currently admits to epigastric pain that is sharp and constant  She has had no further episodes of vomiting while in the emergency department  She denies any hematochezia ischemia, melena, hematemesis  She admits to episodes of diarrhea in the past   She denies any recent antibiotic use  Of note, patient has history of cataplexy and follows up with Urology outpatient  She uses a wheelchair to ambulate  Patient also has history of small fiber neuropathy and Wegener's granulomatosis  Patient does admit to smoking marijuana stating she smokes this morning  She denies any other drug use, alcohol use, tobacco use  Patient was found to have a positive pregnancy test in the emergency department  Patient states she has not been sexually active for over a year  She states she is menopausal and has not had a period since 2015  Patient states she does not take her insulin on a regular basis and use it only as needed    She has chronic pain for which she takes Percocet, tizanidine and gabapentin  Patient states she takes Adderall for ADHD but states she has not taken it for the past 3 days  Review of Systems:    Review of Systems   Constitutional: Negative for chills, diaphoresis and fever  Respiratory: Negative for shortness of breath and wheezing  Cardiovascular: Negative for chest pain, palpitations and leg swelling  Gastrointestinal: Positive for abdominal pain, diarrhea, nausea and vomiting  Negative for blood in stool and constipation  Genitourinary: Negative for difficulty urinating, dysuria, hematuria and urgency  Neurological: Negative for dizziness, light-headedness and headaches  Psychiatric/Behavioral: Negative for confusion  All other systems reviewed and are negative        Past Medical and Surgical History:     Past Medical History:   Diagnosis Date    ADHD     Anemia of chronic disease     Anxiety     Asthma     Asthma     Borderline personality disorder ([facility] )     Cataplexy     Chronic abdominal pain     CKD (chronic kidney disease) stage 3, GFR 30-59 ml/min (HCC)     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[facility] )     Cushing syndrome ([facility] )     [facility]     DVT (deep venous thrombosis) ([place] [address] )     History of acute pancreatitis     felt secondary to Bactrim    HTN (hypertension)     Hypertension     Liver disease     fatty liver    Microscopic polyangiitis (HCC)     Morbid obesity (HCC)     MPA (microscopic polyangiitis) (HCC)     Ovarian cyst     PTSD (post-traumatic stress disorder)     Renal disorder     Self-inflicted injury     self inflicted skin wounds    Wegener's granulomatosis with renal involvement ([facility] ) 2015       Past Surgical History:   Procedure Laterality Date    ESOPHAGOGASTRODUODENOSCOPY  09/11/2015    mild antral gastritis    LA COLONOSCOPY FLX DX W/COLLJ SPEC WHEN PFRMD N/A 12/14/2018    Procedure: COLONOSCOPY with polypectomy;  Surgeon: Erendira Romo MD;  Location: [facility]; Service: Gastroenterology    AK ESOPHAGOGASTRODUODENOSCOPY TRANSORAL DIAGNOSTIC N/A 12/14/2018    Procedure: ESOPHAGOGASTRODUODENOSCOPY (EGD) with biopsy;  Surgeon: Danilo Welch MD;  Location: [facility]; Service: Gastroenterology    RELEASE SCAR CONTRACTURE / GRAFT REPAIRS OF HAND         Meds/Allergies:    Prior to Admission medications    Medication Sig Start Date End Date Taking?  Authorizing Provider   albuterol (VENTOLIN HFA) 90 mcg/act inhaler Inhale 2 puffs every 6 (six) hours as needed for wheezing 12/12/18   Federico Mcgregor PA-C   amLODIPine (NORVASC) 5 mg tablet Take 1 tablet (5 mg total) by mouth daily 1/15/19   Governor Arnaldo,    amphetamine-dextroamphetamine (ADDERALL XR) 20 MG 24 hr capsule Take 1 capsule (20 mg total) by mouth 2 (two) times a day Max Daily Amount: 40 mg 1/8/19   Federico Mcgregor PA-C   cycloSPORINE (RESTASIS) 0 05 % ophthalmic emulsion Administer 1 drop to both eyes 2 (two) times a day    Historical Provider, MD   dicyclomine (BENTYL) 20 mg tablet Take 1 tablet (20 mg total) by mouth every 6 (six) hours as needed (pain) 10/7/18   Samy Jacobs,    Elastic Bandages & Supports (THUMB SPLINT/LEFT MEDIUM) MISC by Does not apply route daily 4/26/18   Federico Mcgregor PA-C   famotidine (PEPCID) 20 mg tablet Take 1 tablet (20 mg total) by mouth 2 (two) times a day for 10 days 12/8/18 12/18/18  [id number] Darshan Prince,    famotidine (PEPCID) 20 mg tablet Take 1 tablet (20 mg total) by mouth 2 (two) times a day 1/16/19   Federico Mcgregor PA-C   gabapentin (NEURONTIN) 300 mg capsule Take 1 capsule (300 mg total) by mouth 3 (three) times a day 9/18/18   Federico Mcgregor PA-C   glucose blood (FREESTYLE LITE) test strip 1 each by Other route 3 (three) times a day 8/13/18   Federico Mcgregor PA-C   Humidifiers (COOL MIST HUMIDIFIER 1 GALLON) MISC by Does not apply route as needed (shortness of breath) 2/27/18   Federico Mcgregor PA-C   insulin aspart (NOVOLOG FLEXPEN) 100 Units/mL injection pen Inject 6 Units under the skin 3 (three) times a day with meals 1/10/19   Yin Anderson PA-C   Insulin Glargine (TOUJEO MAX SOLOSTAR) 300 units/mL CONCETRATED U-300 injection pen Inject 22 Units under the skin daily  Patient not taking: Reported on 12/8/2018  11/15/18   Yin Anderson PA-C   Insulin Pen Needle (PEN NEEDLES) 31G X 8 MM MISC Inject 1 Stick under the skin 4 (four) times a day (with meals and at bedtime) 3/28/18   Yin Anderson PA-C   Lancets (FREESTYLE) lancets by Other route 3 (three) times a day 4/5/18   Yin Anderson PA-C   [facility]ron [facility]) 4 mg tablet Take 1 tablet (4 mg total) by mouth every 8 (eight) hours as needed for nausea or vomiting 9/18/18   Yin Anderson PA-C   ondansetron (ZOFRAN-ODT) 4 mg disintegrating tablet Take 1 tablet (4 mg total) by mouth every 8 (eight) hours as needed for nausea or vomiting 8/21/18   Issac Campos MD   ondansetron (ZOFRAN-ODT) 4 mg disintegrating tablet Take 1 tablet (4 mg total) by mouth every 8 (eight) hours as needed for nausea or vomiting 12/11/18   Yin Anderson PA-C   oxyCODONE-acetaminophen (PERCOCET) 7 5-325 MG per tablet Take 1 tablet by mouth every 6 (six) hours as needed (pain) Max Daily Amount: 4 tablets 1/8/19   Yin Anderson PA-C   polyethylene glycol (GOLYTELY) 4000 mL solution Take 4,000 mL by mouth once for 1 dose 3/28/18 3/28/18  Leopold Ewings, MD   QUEtiapine (SEROquel) 100 mg tablet Take 1 tablet (100 mg total) by mouth daily at bedtime 12/31/18   Yin Anderson PA-C   Salicylic Acid 26 % SOLN Apply twice daily to the wart    Patient not taking: Reported on 9/12/2018 8/20/18   Sarah Dolan DPM   simethicone (MYLICON) 108 MG chewable tablet Chew 1 tablet (125 mg total) every 6 (six) hours as needed for flatulence 6/5/18   Yin Anderson PA-C   TiZANidine (ZANAFLEX) 4 MG capsule Take 1 capsule (4 mg total) by mouth 3 (three) times a day 11/14/18   Yin Anderson PA-C     I have reviewed home medications with patient personally  Allergies: Allergies   Allergen Reactions    Amlodipine Hives    Bactrim [Sulfamethoxazole-Trimethoprim]      Pt "They think that is what cause the pancreatitis"     Haldol [Haloperidol] Other (See Comments)     "I don't like it"    Ibuprofen     Navane [Thiothixene]      SI    Other      "novaine?" antipsychotic    Prozac [Fluoxetine Hcl]      SI    Lexapro [Escitalopram Oxalate] Rash       Social History:     Marital Status: Single   Occupation:   Patient Pre-hospital Living Situation: lives alone   Patient Pre-hospital Level of Mobility: wheelchair   Patient Pre-hospital Diet Restrictions: regualr   Substance Use History:   History   Alcohol Use No     History   Smoking Status    Former Smoker    Types: Cigarettes    Quit date: 2010   Smokeless Tobacco    Former User     History   Drug Use    Types: Marijuana       Family History:    Family History   Problem Relation Age of Onset    Colon cancer Neg Hx     Drug abuse Neg Hx         mother father    Mental illness Neg Hx         disorder, mother father       Physical Exam:     Vitals:   Blood Pressure: 165/89 (02/01/19 1612)  Pulse: 94 (02/01/19 1612)  Temperature: 97 9 °F (36 6 °C) (02/01/19 1612)  Temp Source: Temporal (02/01/19 1612)  Respirations: 22 (02/01/19 1612)  Weight - Scale: 95 9 kg (211 lb 6 7 oz) (02/01/19 1025)  SpO2: 96 % (02/01/19 1612)    Physical Exam   HENT:   Head: Normocephalic and atraumatic  Eyes: Pupils are equal, round, and reactive to light  Conjunctivae are normal    Neck: Neck supple  Pulmonary/Chest: Effort normal and breath sounds normal  No respiratory distress  She has no wheezes  She has no rales  Abdominal: Soft  Bowel sounds are normal  There is no rebound  Tenderness of the epigastric region with voluntary guarding, no involuntary guarding noted   Neurological: She is alert     Oriented to person, place, month, year, patient is very fidgety in bed sitting up and down and rolling side-to-side frequently, no focal neurologic deficits noted, patient is globally weak throughout   Skin: Skin is warm and dry  She is not diaphoretic  Psychiatric:   Appears uncomfortable, mildly anxious   Nursing note and vitals reviewed  Additional Data:     Lab Results: I have personally reviewed pertinent reports  Results from last 7 days  Lab Units 02/01/19  1043   WBC Thousand/uL 15 73*   HEMOGLOBIN g/dL 14 0   HEMATOCRIT % 41 7   PLATELETS Thousands/uL 303   BANDS PCT % 15*   LYMPHO PCT % 3*   MONO PCT % 0*   EOS PCT % 0       Results from last 7 days  Lab Units 02/01/19  1043   SODIUM mmol/L 140   POTASSIUM mmol/L 4 0   CHLORIDE mmol/L 103   CO2 mmol/L 23   BUN mg/dL 31*   CREATININE mg/dL 2 16*   ANION GAP mmol/L 14*   CALCIUM mg/dL 9 9   ALBUMIN g/dL 4 2   TOTAL BILIRUBIN mg/dL 0 24   ALK PHOS U/L 128*   ALT U/L 33   AST U/L 22   GLUCOSE RANDOM mg/dL 168*           Results from last 7 days  Lab Units 02/01/19  1446 02/01/19  1207   POC GLUCOSE mg/dl 155* 148*               Imaging: I have personally reviewed pertinent reports  CT abdomen pelvis wo contrast   ED Interpretation by Meribeth Gottron, DO (02/01 1427)       IMPRESSION:       No acute pathology  No urolithiasis or obstructive uropathy        Colonic diverticulosis        Stable left paraovarian cyst       Final Result by Stephanie Gallo MD (02/01 1415)      No acute pathology  No urolithiasis or obstructive uropathy  Colonic diverticulosis  Stable left paraovarian cyst          Workstation performed: [id number]             EKG, Pathology, and Other Studies Reviewed on Admission:   · EKG: none    Allscripts / Epic Records Reviewed: Yes     ** Please Note: This note has been constructed using a voice recognition system   **

## 2019-02-01 NOTE — ASSESSMENT & PLAN NOTE
Lab Results   Component Value Date    HGBA1C 6 1 07/26/2018       Recent Labs      02/01/19   1207  02/01/19   1446   POCGLU  148*  155*       Blood Sugar Average: Last 72 hrs:  (P) 151 5   · Will obtain updated A1c  · Patient states she only takes insulin as needed  · Will place on sliding scale insulin, Accu-Cheks, monitor closely and adjust insulin regimen as needed  · Hypoglycemic protocol on board

## 2019-02-01 NOTE — ASSESSMENT & PLAN NOTE
· Patient with episodes of 4-5 watery stools since yesterday  · Patient does have a history of diarrhea  · Recently seen by GI thought to have gastroparesis versus IBS  · Will check stool enteric pathogen, C   Diff  · Monitor bowel movements

## 2019-02-01 NOTE — ED NOTES
Pt and pts aid stated the pt vomited a moderate amount of blue vomit  Pt states she did not eat anything blue  Dr Greer La made aware        Bambi Jaramillo, RN  02/01/19 3920

## 2019-02-01 NOTE — ED NOTES
Pt  Appears to be uncomfortable   Pt c/o increased nausea  WIll administer zofran and  ativan        Thornton Lesch, RN  02/01/19 [id number] Doyle Prince RN  02/01/19 5923

## 2019-02-01 NOTE — ASSESSMENT & PLAN NOTE
· POA with primarily epigastric abdominal pain that started last evening with nausea, vomiting and diarrhea  · Patient does note pain started after eating ziti and a glass of milk   · CT abdomen:  No acute pathology, no urolithiasis or obstructive uropathy, colonic diverticulosis, stable left paraovarian cyst  · Recent EGD/colonoscopy outpatient on 12/14/2018:  Gastritis, negative H pylori, polyps removed in colon   · Ddx: gastritis, vs viral gastroenteritis, gastroparesis related, IBS   · Alk-phos elevated 128, normal lipase  · Will order PPI, Zofran prn for nausea   · Continue bentyl, mylicon   · Pain control   · IV fluid hydration  · Serial abdominal exams   · Check UA, UDS  · Placed on GI non ulcerogenic diet   · Patient did have positive pregnancy test upon admission, states she in menopausal and has not had a period since 2015, states she is not currently sexually active and denies sexual intercourse for over 1 year, would appreciate OBGYN consult

## 2019-02-01 NOTE — ASSESSMENT & PLAN NOTE
· Hx cataplexy, follows with Neurology outpatient  · Patient uses wheelchair , as well as has a hospital bed at home and a lift chair as well as home health aides

## 2019-02-01 NOTE — ASSESSMENT & PLAN NOTE
· POA with leukocytosis of WBC 15 73 likely related to dehydration 2/2 nausea/vomiting/diarrhea  · Will check stool sample  · Monitor WBC, fever curve, hemodynamics

## 2019-02-01 NOTE — ED NOTES
Pt vomiting and c/o abdominal discomfort  Dr Belinda Stevens aware        Natty Gordon, RN  02/01/19 0727

## 2019-02-01 NOTE — ASSESSMENT & PLAN NOTE
· Per chart review creatinine baseline 1 7-2 1   · Follows with Dr Claudell Ice outpatient   · Cr 2 16 POA   · Continue to monitor renal function

## 2019-02-01 NOTE — ED NOTES
Advised Dr Shaneka Khan that pt is sleeping comfortably at this time  Per Dr Shaneka Khan do not administer Ativan at this time  If pt wakes up c/o nausea then administer ativan for pt comfort        Opal London RN  02/01/19 0580

## 2019-02-01 NOTE — CONSULTS
CONSULTATION - Gynecology   Salvador Alanis 50 y o  female MRN: [id number]  Unit/Bed#: E5 -01 Encounter: [id number]      SUBJECTIVE    Physician/Team requesting consult: Marquis Bryan PA-C/Hospitalist  Reason for Consult / Principal Problem: Positive pregnancy test    HPI: Salvador Alanis is a 50 y o  P0 who presented to the ED this morning with abdominal pain, nausea, vomiting, and diarrhea  A qualitative pregnancy test was collected and resulted positive  A follow up HCG quantitative analysis resulted 7 6  The patient states she has not been sexually active in over a year  She reports she has been post-menopausal in 2015  She has had no vaginal bleeding since that time  She denies any pertinent GYN history and denies Hx of STDs or gynecological disorders  It is difficult to ilicit further information from [place] as she is in discomfort, cannot stay still, and acts strangely  In her hospital bed she is lying with her face down on the bed, back up, and does not turn to speak to the interviewer  She is pleasant and appears to understand the conversation and provides some but not all answers to my questions  Review of Systems   Constitutional: Negative for chills and fever  Gastrointestinal: Positive for abdominal pain, diarrhea, nausea and vomiting  Genitourinary: Negative for hematuria, menstrual problem, urgency and vaginal bleeding  Psychiatric/Behavioral: Positive for behavioral problems and decreased concentration  The patient is not nervous/anxious        Past Medical History:   Diagnosis Date    ADHD     Anemia of chronic disease     Anxiety     Asthma     Asthma     Borderline personality disorder (HCC)     Cataplexy     Chronic abdominal pain     CKD (chronic kidney disease) stage 3, GFR 30-59 ml/min ([facility])     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HCC)     [facility] syndrome [facility]     [facility]     DVT [facility]deep venous thrombosis) [facility]     History of acute pancreatitis     felt secondary to Bactrim    HTN (hypertension)     Hypertension     Liver disease     fatty liver    Microscopic polyangiitis (HCC)     Morbid obesity (HCC)     MPA (microscopic polyangiitis) (HCC)     Ovarian cyst     PTSD (post-traumatic stress disorder)     Renal disorder     Self-inflicted injury     self inflicted skin wounds    Wegener's granulomatosis with renal involvement ([facility] ) 2015     Past Surgical History:   Procedure Laterality Date    ESOPHAGOGASTRODUODENOSCOPY  09/11/2015    mild antral gastritis    WI COLONOSCOPY FLX DX W/COLLJ SPEC WHEN PFRMD N/A 12/14/2018    Procedure: COLONOSCOPY with polypectomy;  Surgeon: aNkul Chris MD;  Location: [facility]; Service: Gastroenterology    WI ESOPHAGOGASTRODUODENOSCOPY TRANSORAL DIAGNOSTIC N/A 12/14/2018    Procedure: ESOPHAGOGASTRODUODENOSCOPY (EGD) with biopsy;  Surgeon: Nakul Chris MD;  Location: [facility];   Service: Gastroenterology    RELEASE SCAR CONTRACTURE / GRAFT REPAIRS OF HAND       OB History   No data available     Family History   Problem Relation Age of Onset    Colon cancer Neg Hx     Drug abuse Neg Hx         mother father    Mental illness Neg Hx         disorder, mother father     Social History   History   Alcohol Use No     History   Drug Use    Types: Marijuana     History   Smoking Status    Former Smoker    Types: Cigarettes    Quit date: 2010   Smokeless Tobacco    Former User       Meds/Allergies   Current Facility-Administered Medications   Medication Dose Route Frequency    acetaminophen (TYLENOL) tablet 650 mg  650 mg Oral Q6H PRN    albuterol (PROVENTIL HFA,VENTOLIN HFA) inhaler 2 puff  2 puff Inhalation Q6H PRN    [START ON 2/2/2019] amLODIPine (NORVASC) tablet 5 mg  5 mg Oral Daily    dicyclomine (BENTYL) tablet 20 mg  20 mg Oral Q6H PRN    gabapentin (NEURONTIN) capsule 300 mg  300 mg Oral TID    heparin (porcine) subcutaneous injection 5,000 Units  5,000 Units Subcutaneous Q8H [address]    hydrALAZINE (APRESOLINE) injection 5 mg  5 mg Intravenous Q6H PRN    insulin lispro (HumaLOG) 100 units/mL subcutaneous injection 1-5 Units  1-5 Units Subcutaneous HS    insulin lispro (HumaLOG) 100 units/mL subcutaneous injection 1-6 Units  1-6 Units Subcutaneous TID AC    metoclopramide (REGLAN) injection 10 mg  10 mg Intravenous Once    ondansetron (ZOFRAN) injection 4 mg  4 mg Intravenous Q6H PRN    oxyCODONE-acetaminophen (PERCOCET) 5-325 mg per tablet 1 tablet  1 tablet Oral Q4H PRN    [START ON 2/2/2019] pantoprazole (PROTONIX) EC tablet 40 mg  40 mg Oral Early Morning    QUEtiapine (SEROquel) tablet 100 mg  100 mg Oral HS    simethicone (MYLICON) chewable tablet 120 mg  120 mg Oral Q6H PRN    sodium chloride 0 9 % infusion  100 mL/hr Intravenous Continuous    tiZANidine (ZANAFLEX) tablet 4 mg  4 mg Oral TID       Allergies   Allergen Reactions    Amlodipine Hives    Bactrim [Sulfamethoxazole-Trimethoprim]      Pt "They think that is what cause the pancreatitis"     Haldol [Haloperidol] Other (See Comments)     "I don't like it"    Ibuprofen     Navane [Thiothixene]      SI    Other      "novaine?" antipsychotic    Prozac [Fluoxetine Hcl]      SI    Lexapro [Escitalopram Oxalate] Rash       Objective   Vitals:  Vitals:    02/01/19 1330 02/01/19 1345 02/01/19 1456 02/01/19 1612   BP:   (!) 172/88 165/89   BP Location:   Left arm Left arm   Pulse: 96 90 92 94   Resp:   20 22   Temp:    97 9 °F (36 6 °C)   TempSrc:    Temporal   SpO2: 98% 97% 98% 96%   Weight:             Intake/Output Summary (Last 24 hours) at 02/01/19 1655  Last data filed at 02/01/19 1427   Gross per 24 hour   Intake             2000 ml   Output                0 ml   Net             2000 ml       Physical Exam    LAB RESULTS  Admission on 02/01/2019   Component Date Value    WBC 02/01/2019 15 73*    RBC 02/01/2019 4 53     Hemoglobin 02/01/2019 14 0     Hematocrit 02/01/2019 41 7     MCV 02/01/2019 92     MCH 02/01/2019 30 9     MCHC 02/01/2019 33 6     RDW 02/01/2019 13 3     MPV 02/01/2019 10 2     Platelets 87/31/5786 303     nRBC 02/01/2019 0     Sodium 02/01/2019 140     Potassium 02/01/2019 4 0     Chloride 02/01/2019 103     CO2 02/01/2019 23     ANION GAP 02/01/2019 14*    BUN 02/01/2019 31*    Creatinine 02/01/2019 2 16*    Glucose 02/01/2019 168*    Calcium 02/01/2019 9 9     eGFR 02/01/2019 26     Lipase 02/01/2019 270     Total Bilirubin 02/01/2019 0 24     Bilirubin, Direct 02/01/2019 0 07     Alkaline Phosphatase 02/01/2019 128*    AST 02/01/2019 22     ALT 02/01/2019 33     Total Protein 02/01/2019 9 0*    Albumin 02/01/2019 4 2     Troponin I 02/01/2019 <0 02     Preg, Serum 02/01/2019 Positive*    Segmented % 02/01/2019 82*    Bands % 02/01/2019 15*    Lymphocytes % 02/01/2019 3*    Monocytes % 02/01/2019 0*    Eosinophils, % 02/01/2019 0     Basophils % 02/01/2019 0     Absolute Neutrophils 02/01/2019 15 26*    Lymphocytes Absolute 02/01/2019 0 47*    Monocytes Absolute 02/01/2019 0 00     Eosinophils Absolute 02/01/2019 0 00     Basophils Absolute 02/01/2019 0 00     Total Counted 02/01/2019 100     RBC Morphology 02/01/2019 Normal     Platelet Estimate 82/94/4817 Adequate     HCG, Quant 02/01/2019 7 6*    POC Glucose 02/01/2019 148*    POC Glucose 02/01/2019 155*     IMAGING  I have personally reviewed pertinent reports  Assessment/Plan     Assessment/Plan:  1   False positive pregnancy test   - Likely a result of either heterophile antibodies and/or medications/hormones   - A value of 7 6 hCG is not concerning for malignancy   - Would treat this patient as a non-obstetric patient, defer treatment to primary team   - Recommend outpatient follow-up with OB/GYN and an outpatient TVUS   - CT today demonstrated a stable left paraovarian cyst 3 5 x 2 0cm (previously 3 8 x 2 2 on 4/8/18), TVUS is recommended as it is the preferred modality for imaging ovarian cysts   - OB/GYN signing off at this time, please contact us at  with any questions or concerns    Code Status: Level 1 - Full Code  Advance Directive and Living Will:      Power of :    POLST:      Salem Bloch, DO  PGY-1 OB/GYN   2/1/2019 4:55 PM

## 2019-02-02 VITALS
RESPIRATION RATE: 18 BRPM | SYSTOLIC BLOOD PRESSURE: 153 MMHG | DIASTOLIC BLOOD PRESSURE: 78 MMHG | BODY MASS INDEX: 39.95 KG/M2 | TEMPERATURE: 98 F | OXYGEN SATURATION: 92 % | WEIGHT: 211.42 LBS | HEART RATE: 87 BPM

## 2019-02-02 PROBLEM — R10.9 ABDOMINAL PAIN: Status: RESOLVED | Noted: 2017-12-21 | Resolved: 2019-02-02

## 2019-02-02 LAB
ALBUMIN SERPL BCP-MCNC: 3.4 G/DL (ref 3.5–5)
ALP SERPL-CCNC: 106 U/L (ref 46–116)
ALT SERPL W P-5'-P-CCNC: 28 U/L (ref 12–78)
ANION GAP SERPL CALCULATED.3IONS-SCNC: 10 MMOL/L (ref 4–13)
AST SERPL W P-5'-P-CCNC: 23 U/L (ref 5–45)
BASOPHILS # BLD AUTO: 0.01 THOUSANDS/ΜL (ref 0–0.1)
BASOPHILS NFR BLD AUTO: 0 % (ref 0–1)
BILIRUB SERPL-MCNC: 0.28 MG/DL (ref 0.2–1)
BUN SERPL-MCNC: 29 MG/DL (ref 5–25)
CALCIUM SERPL-MCNC: 8.9 MG/DL (ref 8.3–10.1)
CHLORIDE SERPL-SCNC: 105 MMOL/L (ref 100–108)
CO2 SERPL-SCNC: 24 MMOL/L (ref 21–32)
CREAT SERPL-MCNC: 2.02 MG/DL (ref 0.6–1.3)
EOSINOPHIL # BLD AUTO: 0 THOUSAND/ΜL (ref 0–0.61)
EOSINOPHIL NFR BLD AUTO: 0 % (ref 0–6)
ERYTHROCYTE [DISTWIDTH] IN BLOOD BY AUTOMATED COUNT: 13.5 % (ref 11.6–15.1)
GFR SERPL CREATININE-BSD FRML MDRD: 29 ML/MIN/1.73SQ M
GLUCOSE SERPL-MCNC: 104 MG/DL (ref 65–140)
GLUCOSE SERPL-MCNC: 135 MG/DL (ref 65–140)
GLUCOSE SERPL-MCNC: 144 MG/DL (ref 65–140)
GLUCOSE SERPL-MCNC: 150 MG/DL (ref 65–140)
HCT VFR BLD AUTO: 37.4 % (ref 34.8–46.1)
HGB BLD-MCNC: 12.3 G/DL (ref 11.5–15.4)
IMM GRANULOCYTES # BLD AUTO: 0.05 THOUSAND/UL (ref 0–0.2)
IMM GRANULOCYTES NFR BLD AUTO: 1 % (ref 0–2)
LYMPHOCYTES # BLD AUTO: 0.95 THOUSANDS/ΜL (ref 0.6–4.47)
LYMPHOCYTES NFR BLD AUTO: 9 % (ref 14–44)
MCH RBC QN AUTO: 30.9 PG (ref 26.8–34.3)
MCHC RBC AUTO-ENTMCNC: 32.9 G/DL (ref 31.4–37.4)
MCV RBC AUTO: 94 FL (ref 82–98)
MONOCYTES # BLD AUTO: 0.76 THOUSAND/ΜL (ref 0.17–1.22)
MONOCYTES NFR BLD AUTO: 7 % (ref 4–12)
NEUTROPHILS # BLD AUTO: 8.46 THOUSANDS/ΜL (ref 1.85–7.62)
NEUTS SEG NFR BLD AUTO: 83 % (ref 43–75)
NRBC BLD AUTO-RTO: 0 /100 WBCS
PLATELET # BLD AUTO: 245 THOUSANDS/UL (ref 149–390)
PMV BLD AUTO: 10 FL (ref 8.9–12.7)
POTASSIUM SERPL-SCNC: 3.7 MMOL/L (ref 3.5–5.3)
PROT SERPL-MCNC: 7.4 G/DL (ref 6.4–8.2)
RBC # BLD AUTO: 3.98 MILLION/UL (ref 3.81–5.12)
SODIUM SERPL-SCNC: 139 MMOL/L (ref 136–145)
WBC # BLD AUTO: 10.23 THOUSAND/UL (ref 4.31–10.16)

## 2019-02-02 PROCEDURE — 80053 COMPREHEN METABOLIC PANEL: CPT | Performed by: PHYSICIAN ASSISTANT

## 2019-02-02 PROCEDURE — 82948 REAGENT STRIP/BLOOD GLUCOSE: CPT

## 2019-02-02 PROCEDURE — 85025 COMPLETE CBC W/AUTO DIFF WBC: CPT | Performed by: PHYSICIAN ASSISTANT

## 2019-02-02 PROCEDURE — 99217 PR OBSERVATION CARE DISCHARGE MANAGEMENT: CPT | Performed by: INTERNAL MEDICINE

## 2019-02-02 RX ADMIN — HYDROMORPHONE HYDROCHLORIDE 0.5 MG: 1 INJECTION, SOLUTION INTRAMUSCULAR; INTRAVENOUS; SUBCUTANEOUS at 08:13

## 2019-02-02 RX ADMIN — HYDROMORPHONE HYDROCHLORIDE 0.5 MG: 1 INJECTION, SOLUTION INTRAMUSCULAR; INTRAVENOUS; SUBCUTANEOUS at 03:35

## 2019-02-02 RX ADMIN — DICYCLOMINE HYDROCHLORIDE 20 MG: 20 TABLET ORAL at 08:56

## 2019-02-02 RX ADMIN — GABAPENTIN 300 MG: 300 CAPSULE ORAL at 08:53

## 2019-02-02 RX ADMIN — ONDANSETRON 4 MG: 2 INJECTION INTRAMUSCULAR; INTRAVENOUS at 03:35

## 2019-02-02 RX ADMIN — PANTOPRAZOLE SODIUM 40 MG: 40 TABLET, DELAYED RELEASE ORAL at 06:20

## 2019-02-02 RX ADMIN — TIZANIDINE 4 MG: 4 TABLET ORAL at 08:53

## 2019-02-02 RX ADMIN — HEPARIN SODIUM 5000 UNITS: 5000 INJECTION INTRAVENOUS; SUBCUTANEOUS at 06:20

## 2019-02-02 RX ADMIN — AMLODIPINE BESYLATE 5 MG: 5 TABLET ORAL at 08:53

## 2019-02-02 RX ADMIN — SODIUM CHLORIDE 100 ML/HR: 0.9 INJECTION, SOLUTION INTRAVENOUS at 01:00

## 2019-02-02 NOTE — ASSESSMENT & PLAN NOTE
· Patient with episodes of 4-5 watery stools POA x 1day that has since resolved  · Continue with recommendations as above  · Continue following up with GI outpatient as needed  · Recently seen by GI thought to have gastroparesis versus IBS  · Continue Bentyl

## 2019-02-02 NOTE — ASSESSMENT & PLAN NOTE
Lab Results   Component Value Date    HGBA1C 6 1 07/26/2018       Recent Labs      02/01/19   1446  02/01/19   1703  02/01/19 2053  02/02/19   0741   POCGLU  155*  150*  152*  135       Blood Sugar Average: Last 72 hrs:  (P) 148   · Follow-up with PCP in 1 week with repeat A1c  · Patient states she only takes insulin as needed, continue home regimen of scale insulin and follow up with PCP after repeated A1c to further management diabetes

## 2019-02-02 NOTE — UTILIZATION REVIEW
Network Utilization Review Department  Phone: [phone]; Fax [phone]  [email or web address]  ATTENTION: Please call with any questions or concerns to [phone]  and carefully listen to the prompts so that you are directed to the right person  Send all requests for admission clinical reviews, approved or denied determinations and any other requests to fax [phone]  All voicemails are confidential     Initial Clinical Review    Admission: Date/Time/Statement: OBSERVATION 2/1/19 @1518  Orders Placed This Encounter   Procedures    Place in Observation (expected length of stay for this patient is less than two midnights)     Standing Status:   Standing     Number of Occurrences:   1     Order Specific Question:   Admitting Physician     Answer:   Kristen Mary [[id number]]     Order Specific Question:   Level of Care     Answer:   Med Surg [16]     ED: Date/Time/Mode of Arrival:   ED Arrival Information     Expected Arrival Acuity Means of Arrival Escorted By Service Admission Type    - 2/1/2019 10:18 Urgent Walk-In Family Member General Medicine Urgent    Arrival Complaint    VOMITING; ABDOMINAL PAIN; WEAKNESS        Chief Complaint:   Chief Complaint   Patient presents with    Abdominal Pain     Reports sudden onset approx 0130 of nausea, vomitting, diffuse abdominal pain, and diarrhea  Patient presents with care giver     History of Illness: 51 yo fem to ED from home c/o abd pain, n/v/d x 9 hours  Had ziti w/milk for dinner prior to sx onset  Diffuse abd pain rad to back  Rolling around on bed  Numerous episodes vomiting and loose stools  Seen by GI for possible diabetic gastroparesis or IBS a month ago  GI scop done Dec 2018 showing gastritis  Had pancreatitis in past  Smokes marijuana daily  +preg test in ED, states not sexually active in a year and has not had period since 2015     ED Vital Signs:   ED Triage Vitals   Temperature Pulse Respirations Blood Pressure SpO2 02/01/19 1025 02/01/19 1025 02/01/19 1025 02/01/19 1025 02/01/19 1025   97 8 °F (36 6 °C) 84 (!) 24 (!) 192/105 98 %      Temp Source Heart Rate Source Patient Position - Orthostatic VS BP Location FiO2 (%)   02/01/19 1025 02/01/19 1239 02/01/19 1025 02/01/19 1025 --   Oral Monitor Lying Right arm       Pain Score       02/01/19 1025       8        Wt Readings from Last 1 Encounters:   02/01/19 95 9 kg (211 lb 6 7 oz)     Vital Signs (abnormal): 182/90   172/88  Pertinent Labs/Diagnostic Test Results:   A gap 14   Bun 31, 29       Creat 2 16, 1 02   Gluc 168, 144  Wbc 15 73, 10 23      Bands 15      +pregnancy, quant hcg 7 6  UA: mod bld   >=300 prot   4-10 rbc   occ bacteria  / epithelials  UDS: +THC  CT a&p: No acute pathology   No urolithiasis or obstructive uropathy  Colonic diverticulosis  Stable left paraovarian cyst     ED Treatment:   Medication Administration from 02/01/2019 1018 to 02/01/2019 1608       Date/Time Order Dose Route Action     02/01/2019 1041 sodium chloride 0 9 % bolus 1,000 mL 1,000 mL Intravenous New Bag     02/01/2019 1043 ondansetron (ZOFRAN) injection 4 mg 4 mg Intravenous Given     02/01/2019 1043 pantoprazole (PROTONIX) injection 40 mg 40 mg Intravenous Given     02/01/2019 1041 fentanyl citrate (PF) 100 MCG/2ML 50 mcg 50 mcg Intravenous Given     02/01/2019 1213 metoclopramide (REGLAN) injection 10 mg 10 mg Intravenous Given     02/01/2019 1323 ondansetron (ZOFRAN) injection 4 mg 4 mg Intravenous Given     02/01/2019 1339 LORazepam (ATIVAN) 2 mg/mL injection 1 mg 1 mg Intravenous Given     02/01/2019 1327 sodium chloride 0 9 % bolus 1,000 mL 1,000 mL Intravenous New Bag     02/01/2019 1436 aluminum-magnesium hydroxide-simethicone (MYLANTA) 200-200-20 mg/5 mL oral suspension 30 mL 30 mL Oral Given     02/01/2019 1436 lidocaine viscous (XYLOCAINE) 2 % mucosal solution 10 mL 10 mL Oral Given     02/01/2019 1528 LORazepam (ATIVAN) 2 mg/mL injection 1 mg 1 mg Intravenous Given Past Medical/Surgical History:    Active Ambulatory Problems     Diagnosis Date Noted    Type 2 diabetes mellitus ([facility] ) 04/06/2016    Dyslipidemia 04/06/2016    [facility] 04/06/2016    CKD (chronic kidney disease) stage 4, GFR 15-29 ml/min ([facility])     MPA (microscopic polyangiitis) ([facility])     Asthma     HTN (hypertension)     Acute pancreatitis 07/24/2016    Chronic pain 07/25/2017    Noncompliance 07/25/2017    Bipolar 1 disorder [facility] 12/21/2017    Abdominal pain 12/21/2017    Epigastric pain 01/07/2018    Pancreatitis 01/07/2018    Ovarian cyst 01/07/2018    Postherpetic neuralgia 01/07/2018    Anemia of chronic disease 07/01/2015    Arthralgia of multiple joints 05/24/2016    Cataplexy 12/07/2016    Weakness of both lower extremities 11/29/2016    Diarrhea of presumed infectious origin 03/28/2018    Chronic pelvic pain in female 04/13/2018    Plantar wart, right foot 04/16/2018    Seasonal allergic rhinitis due to pollen 04/26/2018    Gastroesophageal reflux disease 07/26/2018    Weakness of both upper extremities 07/26/2018    Persistent proteinuria 09/11/2018    Left leg pain 11/14/2018    Controlled substance agreement signed 11/14/2018    Chronic narcotic use 11/14/2018    Small fiber neuropathy 12/06/2018       Past Medical History:   Diagnosis Date    ADHD     Anemia of chronic disease     Anxiety     Asthma     Asthma     [facility]line [facility] disorder [facility]     Cataplexy-uses wheelchair and home hospital bed with lift chair     Chronic abdominal pain     CKD (chronic kidney disease) stage 3, GFR 30-59 ml/min ([facility])     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[facility]     Cushing syndrome ([facility]     Diabetes mellitus ([facility]     DVT [facility] thrombosis) [facility]     History of acute pancreatitis     HTN (hypertension)     Hypertension     Liver disease     Microscopic polyangiitis ([facility]     Morbid obesity ([facility]     MPA [facility]microscopic polyangiitis) ([facility]     Ovarian cyst     PTSD (post-traumatic stress disorder)     Renal disorder     Self-inflicted injury     Wegener's granulomatosis with renal involvement ([facility] ); small fiber neuropathy 2015     Admitting Diagnosis: Gastritis [K29 70]  Abdominal pain [R10 9]  Intractable nausea and vomiting [R11 2]  Age/Sex: 50 y o  female  Assessment/Plan: 49 yo fem to ED From home admitted under OBS due to epigastric pain with nausea and frequent episodes v/d after eating ziti with milk  R/o gastroenteritis, gastroparesis, gastritis, or IBS  Also had positive pregnancy test although is not sexually active x 1 year and no period since 2015  Dec 2018 GI study showed gastritis  Complex medical hx  GI previously suspected gastroparesis vs  IBS  IVF, antiemetics, workup in progress, OB/GYN consulted  Admission Orders:  Scheduled Meds:   Current Facility-Administered Medications:  acetaminophen 650 mg Oral Q6H PRN   albuterol 2 puff Inhalation Q6H PRN   amLODIPine 5 mg Oral Daily   dicyclomine 20 mg Oral Q6H PRN   gabapentin 300 mg Oral TID   heparin (porcine) 5,000 Units Subcutaneous Q8H [address]   hydrALAZINE 5 mg Intravenous Q6H PRN   HYDROmorphone 0 5 mg Intravenous Q4H PRN x 4   insulin lispro 1-5 Units Subcutaneous HS   insulin lispro 1-6 Units Subcutaneous TID AC   ondansetron 4 mg Intravenous Q6H PRN x 1   oxyCODONE-acetaminophen 1 tablet Oral Q4H PRN   pantoprazole 40 mg Oral Early Morning   QUEtiapine 100 mg Oral HS   simethicone 120 mg Oral Q6H PRN   sodium chloride 100 mL/hr Intravenous Continuous   tiZANidine 4 mg Oral TID     IV reglan x 1  Gluc checks ac / hs  scd's  Up w/assist  Stool enteric bacterial panel, c  Diff  a1c   GI non ulcerogenic diet  Cons ob/gyn    Per OB/GYN: false positive pregnancy test likely from heterophile antibodies or medications/hormones  Not concerning for malignancy  Follow up as outpt for TVUS to assess the L paraovarian cyst seen on imaging  Per nursing: had nausea/vomiting 2/1 @2036 2/2 @0900: +abd tenderness

## 2019-02-02 NOTE — ASSESSMENT & PLAN NOTE
· POA with leukocytosis of WBC 15 73 likely related to dehydration 2/2 nausea/vomiting/diarrhea, trended down today  · Afebrile, recheck CBC in 1 week and follow up outpatient with PCP

## 2019-02-02 NOTE — PLAN OF CARE
Problem: PAIN - ADULT  Goal: Verbalizes/displays adequate comfort level or baseline comfort level  Interventions:  - Encourage patient to monitor pain and request assistance  - Assess pain using appropriate pain scale  - Administer analgesics based on type and severity of pain and evaluate response  - Implement non-pharmacological measures as appropriate and evaluate response  - Consider cultural and social influences on pain and pain management  - Notify physician/advanced practitioner if interventions unsuccessful or patient reports new pain  Outcome: Progressing      Problem: INFECTION - ADULT  Goal: Absence or prevention of progression during hospitalization  INTERVENTIONS:  - Assess and monitor for signs and symptoms of infection  - Monitor lab/diagnostic results  - Monitor all insertion sites, i e  indwelling lines, tubes, and drains  - Monitor endotracheal (as able) and nasal secretions for changes in amount and color  - [place] appropriate cooling/warming therapies per order  - Administer medications as ordered  - Instruct and encourage patient and family to use good hand hygiene technique  - Identify and instruct in appropriate isolation precautions for identified infection/condition  Outcome: Progressing    Goal: Absence of fever/infection during neutropenic period  INTERVENTIONS:  - Monitor WBC  - Implement neutropenic guidelines  Outcome: Progressing      Problem: SAFETY ADULT  Goal: Maintain or return to baseline ADL function  INTERVENTIONS:  -  Assess patient's ability to carry out ADLs; assess patient's baseline for ADL function and identify physical deficits which impact ability to perform ADLs (bathing, care of mouth/teeth, toileting, grooming, dressing, etc )  - Assess/evaluate cause of self-care deficits   - Assess range of motion  - Assess patient's mobility; develop plan if impaired  - Assess patient's need for assistive devices and provide as appropriate  - Encourage maximum independence but intervene and supervise when necessary  ¯ Involve family in performance of ADLs  ¯ Assess for home care needs following discharge   ¯ Request OT consult to assist with ADL evaluation and planning for discharge  ¯ Provide patient education as appropriate  Outcome: Progressing    Goal: Maintain or return mobility status to optimal level  INTERVENTIONS:  - Assess patient's baseline mobility status (ambulation, transfers, stairs, etc )    - Identify cognitive and physical deficits and behaviors that affect mobility  - Identify mobility aids required to assist with transfers and/or ambulation (gait belt, sit-to-stand, lift, walker, cane, etc )  - [place] fall precautions as indicated by assessment  - Record patient progress and toleration of activity level on Mobility SBAR; progress patient to next Phase/Stage  - Instruct patient to call for assistance with activity based on assessment  - Request Rehabilitation consult to assist with strengthening/weightbearing, etc   Outcome: Progressing      Problem: DISCHARGE PLANNING  Goal: Discharge to home or other facility with appropriate resources  INTERVENTIONS:  - Identify barriers to discharge w/patient and caregiver  - Arrange for needed discharge resources and transportation as appropriate  - Identify discharge learning needs (meds, wound care, etc )  - Arrange for interpretive services to assist at discharge as needed  - Refer to Case Management Department for coordinating discharge planning if the patient needs post-hospital services based on physician/advanced practitioner order or complex needs related to functional status, cognitive ability, or social support system  Outcome: Progressing      Problem: Knowledge Deficit  Goal: Patient/family/caregiver demonstrates understanding of disease process, treatment plan, medications, and discharge instructions  Complete learning assessment and assess knowledge base    Interventions:  - Provide teaching at level of understanding  - Provide teaching via preferred learning methods  Outcome: Progressing

## 2019-02-02 NOTE — DISCHARGE SUMMARY
Discharge- Juan Antonio Barbosa 1970, 50 y o  female MRN: [id number]    Unit/Bed#: E5 -01 Encounter: [id number]    Primary Care Provider: Valdez Cohen PA-C   Date and time admitted to hospital: 2/1/2019 10:22 AM        * Abdominal painresolved as of 2/2/2019   Assessment & Plan    · POA with primarily epigastric abdominal pain that started last evening with nausea, vomiting and diarrhea  · Patient does note pain started after eating ziti and a glass of milk   · CT abdomen:  No acute pathology, no urolithiasis or obstructive uropathy, colonic diverticulosis, stable left paraovarian cyst  · Recent EGD/colonoscopy outpatient on 12/14/2018:  Gastritis, negative H pylori, polyps removed in colon   · Ddx: gastritis, vs viral gastroenteritis, gastroparesis related, IBS   · Alk-phos elevated 128, normal lipase  · Patient's symptoms have since resolved, she was able to tolerate oral intake without difficulty, she denies any further episodes of diarrhea, she denies any abdominal pain  · Patient recommended continue famotidine  · Continue bentyl, mylicon per home regimen  · She was encouraged to refrain from marijuana use  · Encouraged to continue non ulcerogenic diet and lactose-free diet  · Patient did have false positive pregnancy test upon admission, has been in menopause since 2015 without any bleeding since, denies any sexual activity for 1 year, was seen by OBGYN- false-positive 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     IBS (irritable bowel syndrome)   Assessment & Plan    · Patient with episodes of 4-5 watery stools POA x 1day that has since resolved  · Continue with recommendations as above  · Continue following up with GI outpatient as needed  · Recently seen by GI thought to have gastroparesis versus IBS  · Continue Bentyl     Leukocytosis   Assessment & Plan    · POA with leukocytosis of WBC 15 73 likely related to dehydration 2/2 nausea/vomiting/diarrhea, trended down today  · Afebrile, recheck CBC in 1 week and follow up outpatient with PCP     HTN (hypertension)   Assessment & Plan    · Accelerated hypertension on admission, blood pressure now controlled  · Continue Norvasc 5mg po oral daily   · Follow up closely with PCP outpatient for blood pressure control, may eventually need up titration of medications     Type 2 diabetes mellitus [facility])   Assessment & Plan    Lab Results   Component Value Date    HGBA1C 6 1 07/26/2018       Recent Labs      02/01/19   1446  02/01/19   1703  02/01/19   2053  02/02/19   0741   POCGLU  155*  150*  152*  135       Blood Sugar Average: Last 72 hrs:  (P) 148   · Follow-up with PCP in 1 week with repeat A1c  · Patient states she only takes insulin as needed, continue home regimen of scale insulin and follow up with PCP after repeated A1c to further management diabetes     CKD (chronic kidney disease) stage 4, GFR 15-29 ml/min ([facility])   Assessment & Plan    · Per chart review creatinine baseline 1 7-2 1   · Follows with Dr Marilyn Knight outpatient   · Cr 2 02 slightly improved today, recommend repeating BMP in 1 week   · Patient has an appointment with Nephrology in March       Wegener's granulomatosis [facility])   Assessment & Plan    · Hx of Wegeners granulomatosis      Asthma   Assessment & Plan    · Without acute exacerbation  · Albuterol p r n       Chronic pain   Assessment & Plan    · Patient admits to chronic pain on chronic pain regimen  · PDMP reviewed  · Continue home pain regimen     Bipolar 1 disorder ([facility] [address] )   Assessment & Plan    · Continue Seroquel     Small fiber neuropathy   Assessment & Plan    · Hx of small fiber neuropathy, recent muscle biopsy by Podiatry outpatient      Cataplexy   Assessment & Plan    · Hx cataplexy, follows with Neurology outpatient  · Patient uses wheelchair , as well as has a hospital bed at home and a lift chair as well as home health aides       Discharging Physician / Practitioner: Mariluz Aiken PA-C  PCP: Louann Bamberger, PA-C  Admission Date:   Admission Orders     Ordered        02/01/19 1518  Place in Observation (expected length of stay for this patient is less than two midnights)  Once             Discharge Date: 02/02/19    Resolved Problems  Date Reviewed: 2/2/2019          Resolved    * (Principal)Abdominal pain 2/2/2019     Resolved by  Mariluz Aiken PA-C          Consultations During Hospital Stay:  · none    Procedures Performed:   · none    Significant Findings / Test Results:   · CT abdomen/pelvis:  No acute pathology, no urolithiasis or obstructive uropathy  Colonic diverticulosis, stable left para ovarian cyst  · Creatinine 2 02  · Urine drug screen:  Positive THC    Incidental Findings:   · none    Test Results Pending at Discharge (will require follow up): · Repeat BMP and CBC in 1 week and follow up with PCP outpatient     Outpatient Tests Requested:  · Please follow up with PCP in 1 week for post hospital follow-up  · Continue follow-up with Nephrology outpatient, next appointment in March  · Continue to follow up with Neurology outpatient    Complications:  Abdominal pain    Reason for Admission:  Epigastric abdominal pain    Hospital Course:     Chaparro Ledesma is a 50 y o  female patient who originally presented to the hospital on 2/1/2019 due to epigastric abdominal pain with nausea, vomiting and diarrhea  Patient states that symptoms started night prior to admission after eating ziti with a big glass of milk  Patient was observed over night and received IV fluids for likely dehydration in the setting vomiting and diarrhea  Of note, patient recently received EGD/colonoscopy outpatient on 12/14 which revealed gastritis, negative for H pylori and a few polyps removed in the colon  Patient's symptoms are most likely related to gastritis versus viral gastroenteritis    Patient's symptoms have since resolved and she is currently denying any recurrence of diarrhea, nausea, vomiting  She denies any abdominal pain and was able to tolerate oral intake without difficulty  Patient was recommended to continue famotidine, Bentyl, Mylicon per home regimen and continue non-ulcerogenic diet and lactose free diet upon discharge  She was also recommended to follow up with GI outpatient  Patient's leukocytosis has improved likely related to viral gastroenteritis versus dehydration  She was recommended to repeat CBC in 1 week and follow up with PCP outpatient  Patient has chronic kidney disease and follows with Nephrology outpatient  Patient's kidney function did improve with IV fluids with creatinine 2 02 prior to discharge  She was recommended to get a BMP in 1 week and follow up with PCP to monitor renal function  She does have an appointment with Nephrology in March  Patient's blood pressure was elevated on admission which normalized prior to discharge  She will continue home regimen Norvasc 5 mg daily and follow up with PCP outpatient for possible increase in blood pressure medications for better control  Of note, patient pregnancy test was positive on admission with hCG 7 6  Patient has been in menopause with out  Bleeding since 2015  She states she has not been sexually active for over 1 year  She was seen by OBGYN who confirm false positive pregnancy test most likely to either heterophile antibodies and are medications/hormones  A value of 7 6 hCG is not concerning for malignancy per OBGYN  Recommending outpatient follow-up with OBGYN in outpatient [facility]  Please see above list of diagnoses and related plan for additional information  Condition at Discharge: stable     Discharge Day Visit / Exam:     Subjective:  Patient currently has no acute complaints and is stating she is ready to go home  Patient is more alert and appears comfortable today  She denies any abdominal pain, recurrence of nausea, vomiting, diarrhea    She was able to eat dinner and breakfast   She denies any trouble urinating, chest pain, shortness of breath, difficulty breathing, night sweats, fevers, chills  Vitals: Blood Pressure: 153/78 (02/02/19 0813)  Pulse: 87 (02/02/19 0813)  Temperature: 98 °F (36 7 °C) (02/02/19 0813)  Temp Source: Temporal (02/02/19 0813)  Respirations: 18 (02/02/19 0813)  Weight - Scale: 95 9 kg (211 lb 6 7 oz) (02/01/19 1025)  SpO2: 92 % (02/02/19 0813)  Exam:   Physical Exam   Constitutional: She is oriented to person, place, and time  No distress  HENT:   Head: Normocephalic and atraumatic  Eyes: Conjunctivae are normal    Neck: Neck supple  Cardiovascular: Normal rate, regular rhythm and intact distal pulses  Pulmonary/Chest: Effort normal and breath sounds normal  No respiratory distress  She has no wheezes  Abdominal: Soft  Bowel sounds are normal  There is no tenderness  There is no rebound and no guarding  Musculoskeletal: She exhibits no edema  Neurological: She is alert and oriented to person, place, and time  Skin: Skin is warm and dry  She is not diaphoretic  Psychiatric: She has a normal mood and affect  Nursing note and vitals reviewed  Discussion with Family: patient     Discharge instructions/Information to patient and family:   See after visit summary for information provided to patient and family  Provisions for Follow-Up Care:  See after visit summary for information related to follow-up care and any pertinent home health orders  Disposition:     Home    For Discharges to [facility] SNF:   · Not Applicable to this Patient - Not Applicable to this Patient    Planned Readmission: none     Discharge Statement:  I spent 45 minutes discharging the patient  This time was spent on the day of discharge  I had direct contact with the patient on the day of discharge   Greater than 50% of the total time was spent examining patient, answering all patient questions, arranging and discussing plan of care with patient as well as directly providing post-discharge instructions  Additional time then spent on discharge activities  Discharge Medications:  See after visit summary for reconciled discharge medications provided to patient and family        ** Please Note: This note has been constructed using a voice recognition system **

## 2019-02-02 NOTE — ASSESSMENT & PLAN NOTE
· Per chart review creatinine baseline 1 7-2 1   · Follows with Dr Sara High outpatient   · Cr 2 02 slightly improved today, recommend repeating BMP in 1 week   · Patient has an appointment with Nephrology in March

## 2019-02-02 NOTE — ASSESSMENT & PLAN NOTE
· Accelerated hypertension on admission, blood pressure now controlled  · Continue Norvasc 5mg po oral daily   · Follow up closely with PCP outpatient for blood pressure control, may eventually need up titration of medications

## 2019-02-02 NOTE — DISCHARGE INSTRUCTIONS
Abdominal Pain   AMBULATORY CARE:   Abdominal pain  can be dull, achy, or sharp  You may have pain in one area of your abdomen, or in your entire abdomen  Your pain may be caused by a condition such as constipation, food sensitivity or poisoning, infection, or a blockage  Abdominal pain can also be from a hernia, appendicitis, or an ulcer  Liver, gallbladder, or kidney conditions can also cause abdominal pain  The cause of your abdominal pain may be unknown  Seek care immediately if:   · You have new chest pain or shortness of breath  · You have pulsing pain in your upper abdomen or lower back that suddenly becomes constant  · Your pain is in the right lower abdominal area and worsens with movement  · You have a fever over 100 4°F (38°C) or shaking chills  · You are vomiting and cannot keep food or liquids down  · Your pain does not improve or gets worse over the next 8 to 12 hours  · You see blood in your vomit or bowel movements, or they look black and tarry  · Your skin or the whites of your eyes turn yellow  · You are a woman and have a large amount of vaginal bleeding that is not your monthly period  Contact your healthcare provider if:   · You have pain in your lower back  · You are a man and have pain in your testicles  · You have pain when you urinate  · You have questions or concerns about your condition or care  Treatment for abdominal pain  may include medicine to calm your stomach, prevent vomiting, or decrease pain  Follow up with your healthcare provider as directed:  Write down your questions so you remember to ask them during your visits  © 2017 [employer]  Information is for End User's use only and may not be sold, redistributed or otherwise used for commercial purposes  All illustrations and images included in CareNotes® are the copyrighted property of [employer]  or [employer]    The above information is an educational aid only  It is not intended as medical advice for individual conditions or treatments  Talk to your doctor, nurse or pharmacist before following any medical regimen to see if it is safe and effective for you

## 2019-02-04 ENCOUNTER — TELEPHONE (OUTPATIENT)
Dept: FAMILY MEDICINE CLINIC | Facility: CLINIC | Age: 49
End: 2019-02-04

## 2019-02-05 ENCOUNTER — OFFICE VISIT (OUTPATIENT)
Dept: NEUROLOGY | Facility: CLINIC | Age: 49
End: 2019-02-05
Payer: COMMERCIAL

## 2019-02-05 VITALS
BODY MASS INDEX: 36.32 KG/M2 | DIASTOLIC BLOOD PRESSURE: 70 MMHG | HEIGHT: 63 IN | SYSTOLIC BLOOD PRESSURE: 112 MMHG | WEIGHT: 205 LBS | HEART RATE: 75 BPM

## 2019-02-05 DIAGNOSIS — G89.4 CHRONIC PAIN SYNDROME: ICD-10-CM

## 2019-02-05 DIAGNOSIS — R29.898 WEAKNESS OF BOTH LOWER EXTREMITIES: Primary | ICD-10-CM

## 2019-02-05 DIAGNOSIS — G62.9 SMALL FIBER NEUROPATHY: ICD-10-CM

## 2019-02-05 PROCEDURE — 99204 OFFICE O/P NEW MOD 45 MIN: CPT | Performed by: PSYCHIATRY & NEUROLOGY

## 2019-02-05 PROCEDURE — 1111F DSCHRG MED/CURRENT MED MERGE: CPT | Performed by: PSYCHIATRY & NEUROLOGY

## 2019-02-05 RX ORDER — GABAPENTIN 400 MG/1
400 CAPSULE ORAL 3 TIMES DAILY
Qty: 90 CAPSULE | Refills: 3 | Status: SHIPPED | OUTPATIENT
Start: 2019-02-05 | End: 2019-03-05 | Stop reason: SDUPTHER

## 2019-02-05 NOTE — PROGRESS NOTES
Patient ID: Iza Alex is a 50 y o  female  Assessment/Plan:    Weakness of both lower extremities  Ms  Major Countess comes in with complaints of generalized weakness and paresthesia's  Her neurological examination was effort limited today, specifically her muscle strength testing, but she was able to give me full strength in all muscle groups in both upper and lower extremities  She does have mild acral sensory loss, likely secondary to underlying small fiber neuropathy, which is supported by the abnormal skin biopsy  She has had electrodiagnostic studies of her lower extremities in the past which were normal and not consistent with a large fiber neuropathy or radiculopathy in either lower extremity  She has had MRI of the lower back which only showed minimal degenerative disease without any significant neuroforaminal narrowing or canal stenosis  Her pain in the right hip is certainly contributing to her ambulation, for which she will follow-up with her PCP or orthopedics  I do not think neuropathy alone is responsible for her inability to walk, she attributes this to her cataplexy that she loses control of her legs leading to falls  I have asked her to follow this with her PCP or with a Sleep specialist     The etiology of her small fiber neuropathy is likely her known history of diabetes, certainly chronic kidney disease could be a contributing factor  I have recommended some additional blood work for other reversible causes, will check Anca antibodies as well again  At this time, I do not think we need to consider any immunotherapy, specifically that she has been doing well from her kidney disease as well  I did increase the gabapentin to 400 mg 3 times a day to help her neuropathic pain symptoms as well as paresthesias in her head  I explained my assessment at length to the patient, she demonstrated understanding   I do think she will benefit from therapy, some of her weakness in deconditioning due to pain  She has home health nurses, will contact them to start home PT/OT  I will see her for neuropathy follow up in 4 months, she has my contact information for any questions or concerns  Diagnoses and all orders for this visit:    Weakness of both lower extremities  -     BAM Screen w/ Reflex to Titer/Pattern; Future  -     TSH, 3rd generation with Free T4 reflex; Future  -     Vitamin B12; Future  -     Vitamin B6; Future  -     Sedimentation rate, automated; Future  -     ANCA Screen With MPO and PR3 With Reflex To ANCA Titer; Future  -     Ambulatory Referral to Home Health; Future  -     gabapentin (NEURONTIN) 400 mg capsule; Take 1 capsule (400 mg total) by mouth 3 (three) times a day for 30 days    Small fiber neuropathy  -     Ambulatory referral to Neurology  -     BAM Screen w/ Reflex to Titer/Pattern; Future  -     TSH, 3rd generation with Free T4 reflex; Future  -     Vitamin B12; Future  -     Vitamin B6; Future  -     Sedimentation rate, automated; Future  -     ANCA Screen With MPO and PR3 With Reflex To ANCA Titer; Future  -     Ambulatory Referral to Home Health; Future  -     gabapentin (NEURONTIN) 400 mg capsule; Take 1 capsule (400 mg total) by mouth 3 (three) times a day for 30 days    Chronic pain syndrome  -     gabapentin (NEURONTIN) 400 mg capsule; Take 1 capsule (400 mg total) by mouth 3 (three) times a day for 30 days         Subjective:    HPI  I had the pleasure of seeing your patient in Neurology Clinic for a neuromuscular consultation  As you know, she is a 41-year-old woman who was referred for evaluation for paresthesias  Please allow me to summarize her history for the record  She comes in with complaints of tingling in head and face  It started about 5 years ago, she remembers she was in the woods, started noticing tingling in the back of head on the left side   It started off as being intermittent, may be only once a month, its getting more frequent and happening at different areas of the head, including the face, on either side, or sometimes even in the middle of forehead  It used to last for a few seconds, now lasting up to a minute  She has been headaches, but not associated with the paresthesias  She describes her headaches as tension headaches, pressure like sensations, starting at back of head, behind her eyes, she denies any nausea/ or vomiting  She only had them may be 4-5 times in the whole year, usually responds to rest    Has h/i migraines in the past, haven't had one in about 4 years  She has numbness in the feet, has numbness in the lateral thigh on the right  The numbness on the right leg is more than the left  Does have lower back pain, has pain in the thigh  She does have pain in the right hip, has been told she has osteoarthritis in the right hip  Takes percocet 7 5mg/325mg, also takes gabapentin 300mg tid, which was started for post herpetic neuralgia 3 years ago  She is in a wheelchair today, reports she has cataplexy  She was diagnosed with conversion disorder in the past, was getting episodic symptoms, of falls, not being able to breathe, ? Seizure like episodes  These episodes are better, but still happen periodically  She reports she is unable to ambulate and mostly in a wheelchair because her legs intermittently get completely numb and weak on her due to her cataplexy  Had an EMG/NCS in 8/2017 of the lower extremities, it was normal  Repeat EMG for UE's with me done in October 2018, was normal   Had a skin biopsy at an outside facility, it was done from bilateral calves, showed decreased epidermal nerve fiber density, consistent with the diagnosis of small fiber neuropathy  No evidence of vasculitis or amyloid was seen      Has a diagnosis of wegener's granulomatosis diagnosed with 2014-15, microscopic polyangitis, has CKD stage 4, DM which us under control hba1c was 6 1 recently, she is supposed to be on insulin, which she does not on a regular basis  Has GERD, Asthma  She is not on any immunotherapy, her repeat anca levels were normal    No family hx of any vasculitis, mother had asthma  DM runs in the family  The following portions of the patient's history were reviewed and updated as appropriate: allergies, current medications, past family history, past medical history, past social history, past surgical history and problem list        Objective:    Blood pressure 112/70, pulse 75, height 5' 3" (1 6 m), weight 93 kg (205 lb), not currently breastfeeding  Physical Exam  General exam: Pt was awake, alert and oriented  HEENT: atraumatic, normocephalic  Normal oral mucosa, neck was supple, no lymphadenopathy  Normal peripheral pulses  Extremities did not show any edema or cyanosis  She did have tenderness in the right hip and lateral thigh  Neurological Exam  Neurologically, pt was awake and alert  Speech was normal, no dysarthria or aphasia  Cranial nerve exam showed normal extraocular movements, no nystagmus or diplopia  There was no ptosis at baseline or with sustained upward gaze  Strength of eye closure muscles was normal   Facial sensations were normal bilaterally  No facial weakness, able to blow out the cheeks and push the tongue in the cheeks well  No tongue atrophy or fasciculations  Motor exam revealed normal tone and muscle bulk  There was no atrophy, scapular winging, high arches, hammertoes, shortening of achilles tendons or any other features of neuromuscular disease  Muscle strength was normal in neck flexors and extensors  Her muscle strength testing was limited by effort, but she was able to give me full strength in all muscle groups in both upper and lower extremities  Reflexes were graded as 2 in the upper extremities and at the knees, ankles were decreased bilaterally  Toes were downgoing  There was no exaggerated jaw jerk or louise's sign  No ankle clonus    Sensory exam revealed length dependent, decreased sensation to pin up to ankles and temperature was up to lower 1/3 of legs  Vibration was mildly reduced at toes  Proprioception was mildly abnormal    She was unable to stand for long periods because of pain in her hip and lower back  Gait could not be checked  ROS:  I reviewed the below ROS and what is mentioned in HPI, the remainder of ROS was negative  Review of Systems   Constitutional: Positive for fatigue  HENT: Negative  Eyes: Negative  Respiratory: Negative  Cardiovascular: Negative  Gastrointestinal: Positive for diarrhea and nausea  Endocrine: Negative  Genitourinary: Negative  Musculoskeletal: Positive for arthralgias, back pain, gait problem and myalgias  Skin: Negative  Neurological: Positive for seizures, weakness, numbness and headaches  Hematological: Negative  Psychiatric/Behavioral: The patient is nervous/anxious           Depression

## 2019-02-05 NOTE — ASSESSMENT & PLAN NOTE
Ms Lane Wilson comes in with complaints of generalized weakness and paresthesia's  Her neurological examination was effort limited today, specifically her muscle strength testing, but she was able to give me full strength in all muscle groups in both upper and lower extremities  She does have mild acral sensory loss, likely secondary to underlying small fiber neuropathy, which is supported by the abnormal skin biopsy  She has had electrodiagnostic studies of her lower extremities in the past which were normal and not consistent with a large fiber neuropathy or radiculopathy in either lower extremity  She has had MRI of the lower back which only showed minimal degenerative disease without any significant neuroforaminal narrowing or canal stenosis  Her pain in the right hip is certainly contributing to her ambulation, for which she will follow-up with her PCP or orthopedics  I do not think neuropathy alone is responsible for her inability to walk, she attributes this to her cataplexy that she loses control of her legs leading to falls  I have asked her to follow this with her PCP or with a Sleep specialist     The etiology of her small fiber neuropathy is likely her known history of diabetes, certainly chronic kidney disease could be a contributing factor  I have recommended some additional blood work for other reversible causes, will check Anca antibodies as well again  At this time, I do not think we need to consider any immunotherapy, specifically that she has been doing well from her kidney disease as well  I did increase the gabapentin to 400 mg 3 times a day to help her neuropathic pain symptoms as well as paresthesias in her head  I explained my assessment at length to the patient, she demonstrated understanding  I do think she will benefit from therapy, some of her weakness in deconditioning due to pain  She has home health nurses, will contact them to start home PT/OT       I will see her for neuropathy follow up in 4 months, she has my contact information for any questions or concerns

## 2019-02-05 NOTE — LETTER
February 5, 2019     Jennifer [place], [address]    Patient: Gogo Weldon   YOB: 1970   Date of Visit: 2/5/2019       Dear Dr Haywood Ache: Thank you for referring Gogo Weldon to me for evaluation  Below are my notes for this consultation  If you have questions, please do not hesitate to call me  I look forward to following your patient along with you  Sincerely,        Scot Dotson MD        CC: No Recipients  Scot Dotson MD  2/5/2019  2:04 PM  Sign at close encounter  Patient ID: Gogo Weldon is a 50 y o  female  Assessment/Plan:    Weakness of both lower extremities  Ms Grayson Novak comes in with complaints of generalized weakness and paresthesia's  Her neurological examination was effort limited today, specifically her muscle strength testing, but she was able to give me full strength in all muscle groups in both upper and lower extremities  She does have mild acral sensory loss, likely secondary to underlying small fiber neuropathy, which is supported by the abnormal skin biopsy  She has had electrodiagnostic studies of her lower extremities in the past which were normal and not consistent with a large fiber neuropathy or radiculopathy in either lower extremity  She has had MRI of the lower back which only showed minimal degenerative disease without any significant neuroforaminal narrowing or canal stenosis  Her pain in the right hip is certainly contributing to her ambulation, for which she will follow-up with her PCP or orthopedics  I do not think neuropathy alone is responsible for her inability to walk, she attributes this to her cataplexy that she loses control of her legs leading to falls  I have asked her to follow this with her PCP or with a Sleep specialist     The etiology of her small fiber neuropathy is likely her known history of diabetes, certainly chronic kidney disease could be a contributing factor    I have recommended some additional blood work for other reversible causes, will check Anca antibodies as well again  At this time, I do not think we need to consider any immunotherapy, specifically that she has been doing well from her kidney disease as well  I did increase the gabapentin to 400 mg 3 times a day to help her neuropathic pain symptoms as well as paresthesias in her head  I explained my assessment at length to the patient, she demonstrated understanding  I do think she will benefit from therapy, some of her weakness in deconditioning due to pain  She has home health nurses, will contact them to start home PT/OT  I will see her for neuropathy follow up in 4 months, she has my contact information for any questions or concerns  Diagnoses and all orders for this visit:    Weakness of both lower extremities  -     BAM Screen w/ Reflex to Titer/Pattern; Future  -     TSH, 3rd generation with Free T4 reflex; Future  -     Vitamin B12; Future  -     Vitamin B6; Future  -     Sedimentation rate, automated; Future  -     ANCA Screen With MPO and PR3 With Reflex To ANCA Titer; Future  -     Ambulatory Referral to Home Health; Future  -     gabapentin (NEURONTIN) 400 mg capsule; Take 1 capsule (400 mg total) by mouth 3 (three) times a day for 30 days    Small fiber neuropathy  -     Ambulatory referral to Neurology  -     BAM Screen w/ Reflex to Titer/Pattern; Future  -     TSH, 3rd generation with Free T4 reflex; Future  -     Vitamin B12; Future  -     Vitamin B6; Future  -     Sedimentation rate, automated; Future  -     ANCA Screen With MPO and PR3 With Reflex To ANCA Titer; Future  -     Ambulatory Referral to Home Health; Future  -     gabapentin (NEURONTIN) 400 mg capsule; Take 1 capsule (400 mg total) by mouth 3 (three) times a day for 30 days    Chronic pain syndrome  -     gabapentin (NEURONTIN) 400 mg capsule;  Take 1 capsule (400 mg total) by mouth 3 (three) times a day for 30 days Subjective:    HPI  I had the pleasure of seeing your patient in Neurology Clinic for a neuromuscular consultation  As you know, she is a 49-year-old woman who was referred for evaluation for paresthesias  Please allow me to summarize her history for the record  She comes in with complaints of tingling in head and face  It started about 5 years ago, she remembers she was in the woods, started noticing tingling in the back of head on the left side  It started off as being intermittent, may be only once a month, its getting more frequent and happening at different areas of the head, including the face, on either side, or sometimes even in the middle of forehead  It used to last for a few seconds, now lasting up to a minute  She has been headaches, but not associated with the paresthesias  She describes her headaches as tension headaches, pressure like sensations, starting at back of head, behind her eyes, she denies any nausea/ or vomiting  She only had them may be 4-5 times in the whole year, usually responds to rest    Has h/i migraines in the past, haven't had one in about 4 years  She has numbness in the feet, has numbness in the lateral thigh on the right  The numbness on the right leg is more than the left  Does have lower back pain, has pain in the thigh  She does have pain in the right hip, has been told she has osteoarthritis in the right hip  Takes percocet 7 5mg/325mg, also takes gabapentin 300mg tid, which was started for post herpetic neuralgia 3 years ago  She is in a wheelchair today, reports she has cataplexy  She was diagnosed with conversion disorder in the past, was getting episodic symptoms, of falls, not being able to breathe, ? Seizure like episodes  These episodes are better, but still happen periodically  She reports she is unable to ambulate and mostly in a wheelchair because her legs intermittently get completely numb and weak on her due to her cataplexy      Had an EMG/NCS in 8/2017 of the lower extremities, it was normal  Repeat EMG for UE's with me done in October 2018, was normal   Had a skin biopsy at an outside facility, it was done from bilateral calves, showed decreased epidermal nerve fiber density, consistent with the diagnosis of small fiber neuropathy  No evidence of vasculitis or amyloid was seen  Has a diagnosis of wegener's granulomatosis diagnosed with 2014-15, microscopic polyangitis, has CKD stage 4, DM which us under control hba1c was 6 1 recently, she is supposed to be on insulin, which she does not on a regular basis  Has GERD, Asthma  She is not on any immunotherapy, her repeat anca levels were normal    No family hx of any vasculitis, mother had asthma  DM runs in the family  The following portions of the patient's history were reviewed and updated as appropriate: allergies, current medications, past family history, past medical history, past social history, past surgical history and problem list        Objective:    Blood pressure 112/70, pulse 75, height 5' 3" (1 6 m), weight 93 kg (205 lb), not currently breastfeeding  Physical Exam  General exam: Pt was awake, alert and oriented  HEENT: atraumatic, normocephalic  Normal oral mucosa, neck was supple, no lymphadenopathy  Normal peripheral pulses  Extremities did not show any edema or cyanosis  She did have tenderness in the right hip and lateral thigh  Neurological Exam  Neurologically, pt was awake and alert  Speech was normal, no dysarthria or aphasia  Cranial nerve exam showed normal extraocular movements, no nystagmus or diplopia  There was no ptosis at baseline or with sustained upward gaze  Strength of eye closure muscles was normal   Facial sensations were normal bilaterally  No facial weakness, able to blow out the cheeks and push the tongue in the cheeks well  No tongue atrophy or fasciculations  Motor exam revealed normal tone and muscle bulk   There was no atrophy, scapular winging, high arches, hammertoes, shortening of achilles tendons or any other features of neuromuscular disease  Muscle strength was normal in neck flexors and extensors  Her muscle strength testing was limited by effort, but she was able to give me full strength in all muscle groups in both upper and lower extremities  Reflexes were graded as 2 in the upper extremities and at the knees, ankles were decreased bilaterally  Toes were downgoing  There was no exaggerated jaw jerk or louise's sign  No ankle clonus  Sensory exam revealed length dependent, decreased sensation to pin up to ankles and temperature was up to lower 1/3 of legs  Vibration was mildly reduced at toes  Proprioception was mildly abnormal    She was unable to stand for long periods because of pain in her hip and lower back  Gait could not be checked  ROS:  I reviewed the below ROS and what is mentioned in HPI, the remainder of ROS was negative  Review of Systems   Constitutional: Positive for fatigue  HENT: Negative  Eyes: Negative  Respiratory: Negative  Cardiovascular: Negative  Gastrointestinal: Positive for diarrhea and nausea  Endocrine: Negative  Genitourinary: Negative  Musculoskeletal: Positive for arthralgias, back pain, gait problem and myalgias  Skin: Negative  Neurological: Positive for seizures, weakness, numbness and headaches  Hematological: Negative  Psychiatric/Behavioral: The patient is nervous/anxious           Depression

## 2019-02-06 ENCOUNTER — TELEPHONE (OUTPATIENT)
Dept: NEUROLOGY | Facility: CLINIC | Age: 49
End: 2019-02-06

## 2019-02-06 DIAGNOSIS — M31.30 WEGENER'S GRANULOMATOSIS: Chronic | ICD-10-CM

## 2019-02-06 DIAGNOSIS — F31.9 BIPOLAR 1 DISORDER (HCC): ICD-10-CM

## 2019-02-06 DIAGNOSIS — K52.9 ACUTE GASTROENTERITIS: ICD-10-CM

## 2019-02-06 DIAGNOSIS — G89.4 CHRONIC PAIN SYNDROME: ICD-10-CM

## 2019-02-06 RX ORDER — DEXTROAMPHETAMINE SACCHARATE, AMPHETAMINE ASPARTATE MONOHYDRATE, DEXTROAMPHETAMINE SULFATE AND AMPHETAMINE SULFATE 5; 5; 5; 5 MG/1; MG/1; MG/1; MG/1
20 CAPSULE, EXTENDED RELEASE ORAL 2 TIMES DAILY
Qty: 60 CAPSULE | Refills: 0 | Status: SHIPPED | OUTPATIENT
Start: 2019-02-06 | End: 2019-02-12 | Stop reason: SDUPTHER

## 2019-02-06 RX ORDER — OXYCODONE AND ACETAMINOPHEN 7.5; 325 MG/1; MG/1
1 TABLET ORAL EVERY 6 HOURS PRN
Qty: 120 TABLET | Refills: 0 | Status: SHIPPED | OUTPATIENT
Start: 2019-02-06 | End: 2019-03-04 | Stop reason: SDUPTHER

## 2019-02-06 RX ORDER — ONDANSETRON 4 MG/1
4 TABLET, ORALLY DISINTEGRATING ORAL EVERY 8 HOURS PRN
Qty: 30 TABLET | Refills: 0 | Status: SHIPPED | OUTPATIENT
Start: 2019-02-06 | End: 2019-05-21

## 2019-02-06 NOTE — TELEPHONE ENCOUNTER
JALEN:  Received a call from [facility] with [facility] that she is sending a PT to patient tomorrow

## 2019-02-11 ENCOUNTER — TELEPHONE (OUTPATIENT)
Dept: FAMILY MEDICINE CLINIC | Facility: CLINIC | Age: 49
End: 2019-02-11

## 2019-02-12 ENCOUNTER — TELEPHONE (OUTPATIENT)
Dept: FAMILY MEDICINE CLINIC | Facility: CLINIC | Age: 49
End: 2019-02-12

## 2019-02-12 DIAGNOSIS — F31.9 BIPOLAR 1 DISORDER (HCC): ICD-10-CM

## 2019-02-12 RX ORDER — DEXTROAMPHETAMINE SACCHARATE, AMPHETAMINE ASPARTATE MONOHYDRATE, DEXTROAMPHETAMINE SULFATE AND AMPHETAMINE SULFATE 5; 5; 5; 5 MG/1; MG/1; MG/1; MG/1
20 CAPSULE, EXTENDED RELEASE ORAL DAILY
Qty: 60 CAPSULE | Refills: 0 | Status: SHIPPED | OUTPATIENT
Start: 2019-02-12 | End: 2019-04-08 | Stop reason: SDUPTHER

## 2019-02-12 NOTE — TELEPHONE ENCOUNTER
Pt called and requested if there was even a chance to try with sending a lower dosage to see if the insurance approves its  Patient stated she is in contact with [facility] to schedule to see a psychiatrist but the waiting list for that appt is months away

## 2019-02-19 ENCOUNTER — DOCUMENTATION (OUTPATIENT)
Dept: NEUROLOGY | Facility: CLINIC | Age: 49
End: 2019-02-19

## 2019-02-19 NOTE — PROGRESS NOTES
I faxed the paperwork to  [phone] for [address] [place] [address] and scanned the paperwork into patient's chart

## 2019-02-21 ENCOUNTER — OFFICE VISIT (OUTPATIENT)
Dept: FAMILY MEDICINE CLINIC | Facility: CLINIC | Age: 49
End: 2019-02-21

## 2019-02-21 VITALS
BODY MASS INDEX: 36.41 KG/M2 | DIASTOLIC BLOOD PRESSURE: 76 MMHG | HEIGHT: 63 IN | TEMPERATURE: 98 F | OXYGEN SATURATION: 96 % | WEIGHT: 205.5 LBS | HEART RATE: 90 BPM | SYSTOLIC BLOOD PRESSURE: 124 MMHG | RESPIRATION RATE: 18 BRPM

## 2019-02-21 DIAGNOSIS — N18.4 CKD (CHRONIC KIDNEY DISEASE) STAGE 4, GFR 15-29 ML/MIN (HCC): ICD-10-CM

## 2019-02-21 DIAGNOSIS — F11.90 OPIATE USE: ICD-10-CM

## 2019-02-21 DIAGNOSIS — G89.4 CHRONIC PAIN SYNDROME: ICD-10-CM

## 2019-02-21 DIAGNOSIS — K31.84 GASTROPARESIS: Primary | ICD-10-CM

## 2019-02-21 PROCEDURE — 99214 OFFICE O/P EST MOD 30 MIN: CPT | Performed by: PHYSICIAN ASSISTANT

## 2019-02-21 PROCEDURE — 80307 DRUG TEST PRSMV CHEM ANLYZR: CPT | Performed by: PHYSICIAN ASSISTANT

## 2019-02-21 PROCEDURE — 80365 DRUG SCREENING OXYCODONE: CPT | Performed by: PHYSICIAN ASSISTANT

## 2019-02-21 RX ORDER — LIDOCAINE 40 MG/G
CREAM TOPICAL AS NEEDED
Qty: 120 G | Refills: 2 | Status: SHIPPED | OUTPATIENT
Start: 2019-02-21 | End: 2019-02-26

## 2019-02-21 NOTE — ASSESSMENT & PLAN NOTE
Reviewing GI as notes, looks like patient has been diagnosed with gastroparesis  Did inform her of the importance of eating small diet, and also decreasing the amount of fatty food she eats  As recommended by GI, cut back on the pantoprazole the   Use Bentyl as needed as this can affect the kidneys  Would discuss with Nephrology about use of medication  Make sure to follow up routinely with them

## 2019-02-21 NOTE — PROGRESS NOTES
Assessment/Plan:    CKD (chronic kidney disease) stage 4, GFR 15-29 ml/min ([facility])  Reviewed lab work with patient, did inform her that her GFR does seem to go up and down, would recommend discussing with Nephrology if she is still stage III, or has progressed to stage IV for her kidney disease  Chronic narcotic use  [place] drug database was reviewed, no concerns at this time  Chronic pain  Will add lidocaine to see if this will help with her overall pain symptoms  Will continue with Percocet at this time  Continue using on an as-needed basis  Will also continue with muscle relaxers  Gastroparesis  Reviewing GI as notes, looks like patient has been diagnosed with gastroparesis  Did inform her of the importance of eating small diet, and also decreasing the amount of fatty food she eats  As recommended by GI, cut back on the pantoprazole the   Use Bentyl as needed as this can affect the kidneys  Would discuss with Nephrology about use of medication  Make sure to follow up routinely with them  Diagnoses and all orders for this visit:    Gastroparesis    CKD (chronic kidney disease) stage 4, GFR 15-29 ml/min (HCC)    Opiate use  -     Oxycodone/Oxymorphone urine  -     Toxicology screen, urine    Chronic pain syndrome  -     lidocaine (LMX) 4 % cream; Apply topically as needed for mild pain          Subjective:      Patient ID: Munira Steele is a 50 y o  female  50year-old female presenting for follow-up of hospitalization from 02/01/2019 to 02/02/2019  She was admitted for abdominal pain  She had a CT of the abdomen which came back normal   There was no clear diagnosis after discharge, that was believed to be either gastritis, IBS, or gastroparesis  Since her discharge she has followed up with [facility], and they believe that her symptoms are related to gastroparesis  At this time they recommend her to eat smaller meals throughout the day, decrease the amount of fatty food that she eats  She can cut back on the Protonix from twice a day to once a day  She was also recommended to use Bentyl on an as-needed basis as they believe her symptoms are not related to IBS  There also has concerns with this medication and chronic kidney disease  Also while in the hospital, hospitalist informed her that kidneys have been getting worse, and that she is now in stage 4 kidney disease  She states she got lab work completed at [address] [place] [address] about a week and half ago, and has a follow-up appointment with Nephrology next month  Reviewing note, the last note from Nephrology, patient's creatinine is typically between 1 7 and 2 0  Reviewing all previous lab work over the last year looks like her GFR at its lowest has been 24, and highs has been 36  Patient states that she has not noticed any increased fatigue, swelling, or muscle aches  Patient is also requesting a script for lidocaine cream  States that she has gotten from previous providers for her leg pain and has help in the past  Is continuing to take Percocet on routine basis for her chronic pain  Denies any changes  The following portions of the patient's history were reviewed and updated as appropriate:   She  has a past medical history of ADHD, Anemia of chronic disease, Anxiety, Asthma, Asthma, Borderline personality disorder ([address] ), Cataplexy, Chronic abdominal pain, CKD (chronic kidney disease) stage 3, GFR 30-59 ml/min ([address] ), 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[address] ), Cushing syndrome ([address] ), Diabetes mellitus ([address] ), DVT (deep venous thrombosis) ([address] ), History of acute pancreatitis, HTN (hypertension), Hypertension, Liver disease, Microscopic polyangiitis ([address] ), Morbid obesity ([address] ), MPA (microscopic polyangiitis) ([address] ), Ovarian cyst, PTSD (post-traumatic stress disorder), Renal disorder, Self-inflicted injury, and Wegener's granulomatosis with renal involvement ([address] ) (2015)    She   Patient Active Problem List    Diagnosis Date Noted    Gastroparesis 02/21/2019    IBS (irritable bowel syndrome) 02/01/2019    Leukocytosis 02/01/2019    Small fiber neuropathy 12/06/2018    Left leg pain 11/14/2018    Controlled substance agreement signed 11/14/2018    Chronic narcotic use 11/14/2018    Persistent proteinuria 09/11/2018    Gastroesophageal reflux disease 07/26/2018    Weakness of both upper extremities 07/26/2018    Seasonal allergic rhinitis due to pollen 04/26/2018    Plantar wart, right foot 04/16/2018    Chronic pelvic pain in female 04/13/2018    Diarrhea of presumed infectious origin 03/28/2018    Epigastric pain 01/07/2018    Pancreatitis 01/07/2018    Ovarian cyst 01/07/2018    Postherpetic neuralgia 01/07/2018    Bipolar 1 disorder ([facility] ) 12/21/2017    Chronic pain 07/25/2017    Noncompliance 07/25/2017    Cataplexy 12/07/2016    Weakness of both lower extremities 11/29/2016    Acute pancreatitis 07/24/2016    CKD (chronic kidney disease) stage 4, GFR 15-29 ml/min ([facility])     MPA (microscopic polyangiitis) ([facility])     Asthma     HTN (hypertension)     Arthralgia of multiple joints 05/24/2016    Type 2 diabetes mellitus ([facility] ) 04/06/2016    Dyslipidemia 04/06/2016    Wegener's granulomatosis ([facility] ) 04/06/2016    Anemia of chronic disease 07/01/2015     She  has a past surgical history that includes Esophagogastroduodenoscopy (09/11/2015); Release scar contracture / graft repairs of hand; pr esophagogastroduodenoscopy transoral diagnostic (N/A, 12/14/2018); and pr colonoscopy flx dx w/collj spec when pfrmd (N/A, 12/14/2018)  Her family history is not on file  She  reports that she quit smoking about 9 years ago  Her smoking use included cigarettes  She has quit using smokeless tobacco  She reports that she has current or past drug history  Drug: Marijuana  She reports that she does not drink alcohol    Current Outpatient Medications   Medication Sig Dispense Refill    albuterol (VENTOLIN HFA) 90 mcg/act inhaler Inhale 2 puffs every 6 (six) hours as needed for wheezing 18 g 1    amLODIPine (NORVASC) 5 mg tablet Take 1 tablet (5 mg total) by mouth daily 90 tablet 3    amphetamine-dextroamphetamine (ADDERALL XR) 20 MG 24 hr capsule Take 1 capsule (20 mg total) by mouth dailyMax Daily Amount: 20 mg 60 capsule 0    cycloSPORINE (RESTASIS) 0 05 % ophthalmic emulsion Administer 1 drop to both eyes 2 (two) times a day      dicyclomine (BENTYL) 20 mg tablet Take 1 tablet (20 mg total) by mouth every 6 (six) hours as needed (pain) 10 tablet 0    Elastic Bandages & Supports (THUMB SPLINT/LEFT MEDIUM) MISC by Does not apply route daily 1 each 0    famotidine (PEPCID) 20 mg tablet Take 1 tablet (20 mg total) by mouth 2 (two) times a day 180 tablet 1    gabapentin (NEURONTIN) 400 mg capsule Take 1 capsule (400 mg total) by mouth 3 (three) times a day for 30 days 90 capsule 3    glucose blood (FREESTYLE LITE) test strip 1 each by Other route 3 (three) times a day 100 each 5    Humidifiers (COOL MIST HUMIDIFIER 1 GALLON) MISC by Does not apply route as needed (shortness of breath) 1 each 0    insulin aspart (NOVOLOG FLEXPEN) 100 Units/mL injection pen Inject 6 Units under the skin 3 (three) times a day with meals 5 pen 3    Insulin Glargine (TOUJEO MAX SOLOSTAR) 300 units/mL CONCETRATED U-300 injection pen Inject 22 Units under the skin daily 3 pen 0    Insulin Pen Needle (PEN NEEDLES) 31G X 8 MM MISC Inject 1 Stick under the skin 4 (four) times a day (with meals and at bedtime) 100 each 6    Lancets (FREESTYLE) lancets by Other route 3 (three) times a day 100 each 11    lidocaine (LMX) 4 % cream Apply topically as needed for mild pain 120 g 2    ondansetron (ZOFRAN) 4 mg tablet Take 1 tablet (4 mg total) by mouth every 8 (eight) hours as needed for nausea or vomiting 30 tablet 0    ondansetron (ZOFRAN-ODT) 4 mg disintegrating tablet Take 1 tablet (4 mg total) by mouth every 8 (eight) hours as needed for nausea or vomiting 30 tablet 0    ondansetron (ZOFRAN-ODT) 4 mg disintegrating tablet Take 1 tablet (4 mg total) by mouth every 8 (eight) hours as needed for nausea or vomiting 30 tablet 0    oxyCODONE-acetaminophen (PERCOCET) 7 5-325 MG per tablet Take 1 tablet by mouth every 6 (six) hours as needed (pain) Max Daily Amount: 4 tablets 120 tablet 0    QUEtiapine (SEROquel) 100 mg tablet Take 1 tablet (100 mg total) by mouth daily at bedtime 90 tablet 0    Salicylic Acid 26 % SOLN Apply twice daily to the wart  1 Bottle 5    simethicone (MYLICON) 043 MG chewable tablet Chew 1 tablet (125 mg total) every 6 (six) hours as needed for flatulence 40 tablet 0    TiZANidine (ZANAFLEX) 4 MG capsule Take 1 capsule (4 mg total) by mouth 3 (three) times a day 90 capsule 2     No current facility-administered medications for this visit  She is allergic to amlodipine; bactrim [sulfamethoxazole-trimethoprim]; haldol [haloperidol]; ibuprofen; navane [thiothixene]; other; prozac [fluoxetine hcl]; and lexapro [escitalopram oxalate]       Review of Systems   Constitutional: Negative for chills, diaphoresis, fatigue and fever  Eyes: Negative for visual disturbance  Respiratory: Negative for cough, chest tightness, shortness of breath and wheezing  Cardiovascular: Negative for chest pain, palpitations and leg swelling  Gastrointestinal: Positive for abdominal pain  Negative for constipation, diarrhea, nausea and vomiting  Genitourinary: Negative for dysuria, frequency and hematuria  Musculoskeletal: Positive for arthralgias, back pain, gait problem and myalgias  Negative for joint swelling, neck pain and neck stiffness  Skin: Negative for rash and wound  Neurological: Positive for weakness and numbness  Negative for dizziness, light-headedness and headaches  Psychiatric/Behavioral: Negative for dysphoric mood and sleep disturbance  The patient is not nervous/anxious            Objective:      /76 (BP Location: Right arm, Patient Position: Sitting, Cuff Size: Large)   Pulse 90   Temp 98 °F (36 7 °C) (Tympanic)   Resp 18   Ht 5' 3" (1 6 m)   Wt 93 2 kg (205 lb 8 oz)   LMP  (LMP Unknown)   SpO2 96%   BMI 36 40 kg/m²          Physical Exam   Constitutional: She is oriented to person, place, and time  She appears well-developed and well-nourished  No distress  Cardiovascular: Normal rate, regular rhythm and normal heart sounds  Exam reveals no gallop and no friction rub  No murmur heard  Pulmonary/Chest: Effort normal and breath sounds normal  No stridor  No respiratory distress  She has no wheezes  She has no rales  Abdominal: Normal appearance and bowel sounds are normal  She exhibits no distension  There is no hepatosplenomegaly  There is tenderness in the epigastric area  There is no rigidity, no rebound and no guarding  Musculoskeletal: She exhibits no edema  Lumbar back: She exhibits tenderness, bony tenderness and spasm  She exhibits normal range of motion  Right upper leg: She exhibits tenderness  Left upper leg: She exhibits tenderness  Neurological: She is alert and oriented to person, place, and time  She displays no atrophy and no tremor  No cranial nerve deficit  She exhibits abnormal muscle tone  Gait (Uses wheelchair) abnormal    Skin: She is not diaphoretic  Nursing note and vitals reviewed

## 2019-02-21 NOTE — ASSESSMENT & PLAN NOTE
Will add lidocaine to see if this will help with her overall pain symptoms  Will continue with Percocet at this time  Continue using on an as-needed basis  Will also continue with muscle relaxers

## 2019-02-21 NOTE — ASSESSMENT & PLAN NOTE
Reviewed lab work with patient, did inform her that her GFR does seem to go up and down, would recommend discussing with Nephrology if she is still stage III, or has progressed to stage IV for her kidney disease

## 2019-02-24 LAB
OXYCODONE UR QL CFM: 967 NG/ML
OXYCODONE+OXYMORPHONE SERPLBLD QL SCN: POSITIVE
OXYCODONE+OXYMORPHONE UR QL SCN: NORMAL NG/ML
OXYCODONE+OXYMORPHONE UR QL SCN: POSITIVE
OXYMORPHONE UR CFM-MCNC: 846 NG/ML
OXYMORPHONE UR QL CFM: POSITIVE

## 2019-02-25 ENCOUNTER — TELEPHONE (OUTPATIENT)
Dept: FAMILY MEDICINE CLINIC | Facility: CLINIC | Age: 49
End: 2019-02-25

## 2019-02-25 LAB
AMPHETAMINES UR QL SCN: NEGATIVE NG/ML
BARBITURATES UR QL SCN: NEGATIVE NG/ML
BENZODIAZ UR QL: NEGATIVE NG/ML
BZE UR QL: NEGATIVE NG/ML
CANNABINOIDS UR QL SCN: POSITIVE
METHADONE UR QL SCN: NEGATIVE NG/ML
OPIATES UR QL: NEGATIVE
PCP UR QL: NEGATIVE NG/ML
PROPOXYPH UR QL SCN: NEGATIVE NG/ML

## 2019-02-25 NOTE — TELEPHONE ENCOUNTER
PT CALLED [employer] INSURANCE WILL COVER ONLY 5% LIDOCAINE CREAM  PT WANTS TO KNOW IF A NEW SCRIPT CAN BE WRITTEN FOR THIS?

## 2019-02-26 DIAGNOSIS — G89.4 CHRONIC PAIN SYNDROME: Primary | ICD-10-CM

## 2019-02-26 RX ORDER — LIDOCAINE 50 MG/G
OINTMENT TOPICAL AS NEEDED
Qty: 240 G | Refills: 2 | Status: SHIPPED | OUTPATIENT
Start: 2019-02-26 | End: 2019-08-05 | Stop reason: SDUPTHER

## 2019-02-28 ENCOUNTER — TELEPHONE (OUTPATIENT)
Dept: FAMILY MEDICINE CLINIC | Facility: CLINIC | Age: 49
End: 2019-02-28

## 2019-02-28 NOTE — TELEPHONE ENCOUNTER
Patient called stating gastro diagnosed her with gastroparesis she states gastro wants her to get Ensure and that her pcp needed to give the order?

## 2019-03-02 DIAGNOSIS — G89.29 CHRONIC LOW BACK PAIN WITH BILATERAL SCIATICA, UNSPECIFIED BACK PAIN LATERALITY: ICD-10-CM

## 2019-03-02 DIAGNOSIS — M54.42 CHRONIC LOW BACK PAIN WITH BILATERAL SCIATICA, UNSPECIFIED BACK PAIN LATERALITY: ICD-10-CM

## 2019-03-02 DIAGNOSIS — M54.41 CHRONIC LOW BACK PAIN WITH BILATERAL SCIATICA, UNSPECIFIED BACK PAIN LATERALITY: ICD-10-CM

## 2019-03-04 ENCOUNTER — TELEPHONE (OUTPATIENT)
Dept: FAMILY MEDICINE CLINIC | Facility: CLINIC | Age: 49
End: 2019-03-04

## 2019-03-04 DIAGNOSIS — G89.4 CHRONIC PAIN SYNDROME: ICD-10-CM

## 2019-03-04 DIAGNOSIS — M54.41 CHRONIC LOW BACK PAIN WITH BILATERAL SCIATICA, UNSPECIFIED BACK PAIN LATERALITY: ICD-10-CM

## 2019-03-04 DIAGNOSIS — G89.29 CHRONIC LOW BACK PAIN WITH BILATERAL SCIATICA, UNSPECIFIED BACK PAIN LATERALITY: ICD-10-CM

## 2019-03-04 DIAGNOSIS — M54.42 CHRONIC LOW BACK PAIN WITH BILATERAL SCIATICA, UNSPECIFIED BACK PAIN LATERALITY: ICD-10-CM

## 2019-03-04 DIAGNOSIS — M31.30 WEGENER'S GRANULOMATOSIS: Chronic | ICD-10-CM

## 2019-03-04 RX ORDER — TIZANIDINE HYDROCHLORIDE 4 MG/1
4 CAPSULE, GELATIN COATED ORAL 3 TIMES DAILY
Qty: 90 CAPSULE | Refills: 0 | Status: CANCELLED | OUTPATIENT
Start: 2019-03-04

## 2019-03-04 NOTE — TELEPHONE ENCOUNTER
Janine is contacting our office stating pt is in need of lift chair but can not order it with out a medical provider order  They want to know if you can order this?

## 2019-03-05 DIAGNOSIS — M31.30 WEGENER'S GRANULOMATOSIS: Chronic | ICD-10-CM

## 2019-03-05 DIAGNOSIS — M25.50 ARTHRALGIA OF MULTIPLE JOINTS: ICD-10-CM

## 2019-03-05 DIAGNOSIS — G89.4 CHRONIC PAIN SYNDROME: ICD-10-CM

## 2019-03-05 DIAGNOSIS — R29.898 WEAKNESS OF BOTH LOWER EXTREMITIES: ICD-10-CM

## 2019-03-05 DIAGNOSIS — G47.411 CATAPLEXY: Primary | ICD-10-CM

## 2019-03-05 DIAGNOSIS — G62.9 SMALL FIBER NEUROPATHY: ICD-10-CM

## 2019-03-05 RX ORDER — GABAPENTIN 400 MG/1
400 CAPSULE ORAL 3 TIMES DAILY
Qty: 90 CAPSULE | Refills: 3 | Status: SHIPPED | OUTPATIENT
Start: 2019-03-05 | End: 2019-05-21 | Stop reason: SDUPTHER

## 2019-03-05 NOTE — TELEPHONE ENCOUNTER
I called Maeve Daniele and she is requesting a medical justification why she needs this item    Fax number [phone]

## 2019-03-05 NOTE — TELEPHONE ENCOUNTER
Please contact pt for a f/u rinku with Martha Carlos, reason:medical necessity for lift chair,thanks

## 2019-03-05 NOTE — TELEPHONE ENCOUNTER
Handed you the script  May need to make a follow up appointment to discussed medical necessity for this

## 2019-03-06 RX ORDER — OXYCODONE AND ACETAMINOPHEN 7.5; 325 MG/1; MG/1
1 TABLET ORAL EVERY 6 HOURS PRN
Qty: 120 TABLET | Refills: 0 | Status: SHIPPED | OUTPATIENT
Start: 2019-03-06 | End: 2019-04-10 | Stop reason: SDUPTHER

## 2019-03-06 RX ORDER — TIZANIDINE HYDROCHLORIDE 4 MG/1
4 CAPSULE, GELATIN COATED ORAL 3 TIMES DAILY
Qty: 90 CAPSULE | Refills: 2 | Status: SHIPPED | OUTPATIENT
Start: 2019-03-06 | End: 2019-05-21 | Stop reason: SDUPTHER

## 2019-03-07 ENCOUNTER — APPOINTMENT (OUTPATIENT)
Dept: RADIOLOGY | Age: 49
End: 2019-03-07
Payer: COMMERCIAL

## 2019-03-07 ENCOUNTER — OFFICE VISIT (OUTPATIENT)
Dept: URGENT CARE | Age: 49
End: 2019-03-07
Payer: COMMERCIAL

## 2019-03-07 VITALS
WEIGHT: 202 LBS | BODY MASS INDEX: 37.17 KG/M2 | HEIGHT: 62 IN | TEMPERATURE: 97.1 F | RESPIRATION RATE: 16 BRPM | DIASTOLIC BLOOD PRESSURE: 70 MMHG | SYSTOLIC BLOOD PRESSURE: 129 MMHG | HEART RATE: 76 BPM | OXYGEN SATURATION: 96 %

## 2019-03-07 DIAGNOSIS — M25.511 ACUTE PAIN OF RIGHT SHOULDER: ICD-10-CM

## 2019-03-07 DIAGNOSIS — M75.81 TENDINITIS OF RIGHT ROTATOR CUFF: ICD-10-CM

## 2019-03-07 DIAGNOSIS — M75.21 BICIPITAL TENDINITIS OF RIGHT SHOULDER: Primary | ICD-10-CM

## 2019-03-07 PROCEDURE — 99213 OFFICE O/P EST LOW 20 MIN: CPT | Performed by: PHYSICIAN ASSISTANT

## 2019-03-07 PROCEDURE — 73030 X-RAY EXAM OF SHOULDER: CPT

## 2019-03-07 NOTE — PROGRESS NOTES
[facility]        NAME: Gisela Miller is a 50 y o  female  : 1970    MRN: [id number]  DATE: 2019  TIME: 3:24 PM    Assessment and Plan   Bicipital tendinitis of right shoulder [M75 21]  1  Bicipital tendinitis of right shoulder  XR shoulder 2+ vw right    Ambulatory referral to Physical Therapy   2  Tendinitis of right rotator cuff           Patient Instructions     Continue using medications as directed  Follow up with physical therapy  Motrin and Tylenol as needed for pain control  Follow up with PCP in 3-5 days  Proceed to  ER if symptoms worsen  Chief Complaint     Chief Complaint   Patient presents with    Shoulder Pain     Pt states she started with right shoulder pain a few months ago after a fall  Pt states it seemed better for a while until a few days ago when pain suddenly worsened  History of Present Illness       51-year-old female presents with right shoulder pain  Patient reports she has had some right shoulder pain for the past couple months however the past 2 days have been worse  Reports the shoulder pain initially started after having a slight fall incident where she got her shoulder caught in a awkward position  Denies any numbness or tingling in the arm  No fevers or chills  Pain with lifting the arm  Shoulder Pain    The pain is present in the right shoulder  This is a new problem  The current episode started more than 1 month ago  There has been a history of trauma (Patient reports that she slid off a couch and her arm got caught an awkward position)  The problem occurs constantly  The problem has been gradually worsening  The quality of the pain is described as aching  The pain is moderate  Pertinent negatives include no fever, inability to bear weight, joint locking, joint swelling, numbness, stiffness or tingling  The symptoms are aggravated by activity  She has tried acetaminophen for the symptoms  The treatment provided no relief  Review of Systems   Review of Systems   Constitutional: Negative  Negative for fever  HENT: Negative  Eyes: Negative  Respiratory: Negative  Cardiovascular: Negative  Gastrointestinal: Negative  Musculoskeletal: Negative  Negative for stiffness  Skin: Negative  Neurological: Negative  Negative for tingling and numbness           Current Medications       Current Outpatient Medications:     albuterol (VENTOLIN HFA) 90 mcg/act inhaler, Inhale 2 puffs every 6 (six) hours as needed for wheezing, Disp: 18 g, Rfl: 1    amLODIPine (NORVASC) 5 mg tablet, Take 1 tablet (5 mg total) by mouth daily, Disp: 90 tablet, Rfl: 3    amphetamine-dextroamphetamine (ADDERALL XR) 20 MG 24 hr capsule, Take 1 capsule (20 mg total) by mouth dailyMax Daily Amount: 20 mg, Disp: 60 capsule, Rfl: 0    cycloSPORINE (RESTASIS) 0 05 % ophthalmic emulsion, Administer 1 drop to both eyes 2 (two) times a day, Disp: , Rfl:     dicyclomine (BENTYL) 20 mg tablet, Take 1 tablet (20 mg total) by mouth every 6 (six) hours as needed (pain), Disp: 10 tablet, Rfl: 0    famotidine (PEPCID) 20 mg tablet, Take 1 tablet (20 mg total) by mouth 2 (two) times a day, Disp: 180 tablet, Rfl: 1    gabapentin (NEURONTIN) 400 mg capsule, Take 1 capsule (400 mg total) by mouth 3 (three) times a day for 30 days, Disp: 90 capsule, Rfl: 3    glucose blood (FREESTYLE LITE) test strip, 1 each by Other route 3 (three) times a day, Disp: 100 each, Rfl: 5    Humidifiers (COOL MIST HUMIDIFIER 1 GALLON) MISC, by Does not apply route as needed (shortness of breath), Disp: 1 each, Rfl: 0    insulin aspart (NOVOLOG FLEXPEN) 100 Units/mL injection pen, Inject 6 Units under the skin 3 (three) times a day with meals, Disp: 5 pen, Rfl: 3    Insulin Glargine (TOUJEO MAX SOLOSTAR) 300 units/mL CONCETRATED U-300 injection pen, Inject 22 Units under the skin daily, Disp: 3 pen, Rfl: 0    Insulin Pen Needle (PEN NEEDLES) 31G X 8 MM MISC, Inject 1 Stick under the skin 4 (four) times a day (with meals and at bedtime), Disp: 100 each, Rfl: 6    Lancets (FREESTYLE) lancets, by Other route 3 (three) times a day, Disp: 100 each, Rfl: 11    lidocaine (XYLOCAINE) 5 % ointment, Apply topically as needed for mild pain, Disp: 240 g, Rfl: 2    ondansetron (ZOFRAN) 4 mg tablet, Take 1 tablet (4 mg total) by mouth every 8 (eight) hours as needed for nausea or vomiting, Disp: 30 tablet, Rfl: 0    ondansetron (ZOFRAN-ODT) 4 mg disintegrating tablet, Take 1 tablet (4 mg total) by mouth every 8 (eight) hours as needed for nausea or vomiting, Disp: 30 tablet, Rfl: 0    ondansetron (ZOFRAN-ODT) 4 mg disintegrating tablet, Take 1 tablet (4 mg total) by mouth every 8 (eight) hours as needed for nausea or vomiting, Disp: 30 tablet, Rfl: 0    oxyCODONE-acetaminophen (PERCOCET) 7 5-325 MG per tablet, Take 1 tablet by mouth every 6 (six) hours as needed (pain)Max Daily Amount: 4 tablets, Disp: 120 tablet, Rfl: 0    QUEtiapine (SEROquel) 100 mg tablet, Take 1 tablet (100 mg total) by mouth daily at bedtime, Disp: 90 tablet, Rfl: 0    simethicone (MYLICON) 718 MG chewable tablet, Chew 1 tablet (125 mg total) every 6 (six) hours as needed for flatulence, Disp: 40 tablet, Rfl: 0    TiZANidine (ZANAFLEX) 4 MG capsule, Take 1 capsule (4 mg total) by mouth 3 (three) times a day, Disp: 90 capsule, Rfl: 2    Elastic Bandages & Supports (THUMB SPLINT/LEFT MEDIUM) MISC, by Does not apply route daily, Disp: 1 each, Rfl: 0    Salicylic Acid 26 % SOLN, Apply twice daily to the wart   (Patient not taking: Reported on 3/7/2019), Disp: 1 Bottle, Rfl: 5    Current Allergies     Allergies as of 03/07/2019 - Reviewed 03/07/2019   Allergen Reaction Noted    Amlodipine Hives 09/24/2018    Bactrim [sulfamethoxazole-trimethoprim]  04/05/2016    Haldol [haloperidol] Other (See Comments) 03/26/2017    Ibuprofen  12/21/2017    Navane [thiothixene]  07/24/2016    Other  04/05/2016    Prozac [fluoxetine hcl]  04/05/2016    Lexapro [escitalopram oxalate] Rash 04/05/2016            The following portions of the patient's history were reviewed and updated as appropriate: allergies, current medications, past family history, past medical history, past social history, past surgical history and problem list      Past Medical History:   Diagnosis Date    ADHD     Anemia of chronic disease     Anxiety     Asthma     Asthma     Borderline personality disorder ([place] [address] )     Cataplexy     Chronic abdominal pain     CKD (chronic kidney disease) stage 3, GFR 30-59 ml/min ([place] [address] )     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[place] [address] )     Cushing syndrome ([place] [address] )     Diabetes mellitus ([place] [address] )     DVT (deep venous thrombosis) ([place] [address] )     History of acute pancreatitis     felt secondary to Bactrim    HTN (hypertension)     Hypertension     Liver disease     fatty liver    Microscopic polyangiitis ([place] [address] )     Morbid obesity ([place] [address] )     MPA (microscopic polyangiitis) ([place] [address] )     Ovarian cyst     PTSD (post-traumatic stress disorder)     Renal disorder     Self-inflicted injury     self inflicted skin wounds    Wegener's granulomatosis with renal involvement ([place] [address] ) 2015       Past Surgical History:   Procedure Laterality Date    ESOPHAGOGASTRODUODENOSCOPY  09/11/2015    mild antral gastritis    MS COLONOSCOPY FLX DX W/COLLJ SPEC WHEN PFRMD N/A 12/14/2018    Procedure: COLONOSCOPY with polypectomy;  Surgeon: Isha Noland MD;  Location: [facility]; Service: Gastroenterology    MS ESOPHAGOGASTRODUODENOSCOPY TRANSORAL DIAGNOSTIC N/A 12/14/2018    Procedure: ESOPHAGOGASTRODUODENOSCOPY (EGD) with biopsy;  Surgeon: Isha Noland MD;  Location: [facility];   Service: Gastroenterology    RELEASE SCAR CONTRACTURE / GRAFT REPAIRS OF HAND         Family History   Problem Relation Age of Onset    Colon cancer Neg Hx     Drug abuse Neg Hx         mother father    Mental illness Neg Hx         disorder, mother father         Medications have been verified  Objective   /70   Pulse 76   Temp (!) 97 1 °F (36 2 °C) (Tympanic)   Resp 16   Ht 5' 2" (1 575 m)   Wt 91 6 kg (202 lb)   LMP  (LMP Unknown)   SpO2 96%   BMI 36 95 kg/m²        Physical Exam     Physical Exam   Constitutional: She is oriented to person, place, and time  She appears well-developed and well-nourished  No distress  HENT:   Head: Normocephalic and atraumatic  Right Ear: External ear normal    Left Ear: External ear normal    Nose: Nose normal    Mouth/Throat: Oropharynx is clear and moist  No oropharyngeal exudate  Eyes: Conjunctivae are normal  Right eye exhibits no discharge  Left eye exhibits no discharge  Neck: Normal range of motion  Neck supple  Cardiovascular: Normal rate, regular rhythm, normal heart sounds and intact distal pulses  No murmur heard  Pulmonary/Chest: Effort normal and breath sounds normal  No respiratory distress  She has no wheezes  She has no rales  Abdominal: Soft  Bowel sounds are normal  There is no tenderness  Musculoskeletal:        Right shoulder: She exhibits decreased range of motion (Secondary to pain), tenderness, bony tenderness, pain and decreased strength (Secondary to pain)  She exhibits no swelling, no effusion, no crepitus, no deformity, no laceration, no spasm and normal pulse  Arms:  Positive speed's test   Positive Bansal test  Positive Neer's impingement  Pain with resisted external rotation  Neurovascularly intact distally   Lymphadenopathy:     She has no cervical adenopathy  Neurological: She is alert and oriented to person, place, and time  Skin: Skin is warm and dry  Psychiatric: She has a normal mood and affect  Nursing note and vitals reviewed

## 2019-03-07 NOTE — PATIENT INSTRUCTIONS
Continue using medications as directed  Follow up with physical therapy  Motrin and Tylenol as needed for pain control  Follow up with PCP in 3-5 days  Proceed to  ER if symptoms worsen  Rotator Cuff Tendinitis   WHAT YOU NEED TO KNOW:   Rotator cuff tendinitis is inflammation of the tendons in your shoulder joint  A tendon is a cord of tough tissue that connects your muscles to your bones  The rotator cuff is made up of a group of muscles and tendons that hold the shoulder joint in place  DISCHARGE INSTRUCTIONS:   Medicines:   · NSAIDs:  These medicines decrease swelling and pain  NSAIDs are available without a doctor's order  Ask your healthcare provider which medicine is right for you  Ask how much to take and when to take it  Take as directed  NSAIDs can cause stomach bleeding or kidney problems if not taken correctly  · Steroids: This medicine may be injected into the rotator cuff area to decrease inflammation and pain  · Take your medicine as directed  Contact your healthcare provider if you think your medicine is not helping or if you have side effects  Tell him or her if you are allergic to any medicine  Keep a list of the medicines, vitamins, and herbs you take  Include the amounts, and when and why you take them  Bring the list or the pill bottles to follow-up visits  Carry your medicine list with you in case of an emergency  Follow up with your healthcare provider or orthopedist as directed:  Write down your questions so you remember to ask them during your visits  Self-care:   · Rest:  Limit activity on your affected shoulder to decrease stress on the tendon  This may help prevent further damage, decrease pain, and promote healing  · Ice:  Ice helps decrease swelling and pain  Ice may also help prevent tissue damage  Use an ice pack, or put crushed ice in a plastic bag  Cover it with a towel and place it on your shoulder for 15 to 20 minutes every hour or as directed      · Shoulder position:  Keep your shoulder in the correct position so it will heal faster  This may be done by increasing the height of armrests while you work, drive, and sit  Try not to sleep on the side of your injured shoulder  If you are a woman, wear a sports bra so that the straps are closer to your neck  This may help decrease stress in the affected shoulder  Physical therapy:  A physical therapist can teach you exercises to help improve movement and strength, and to decrease pain  The exercises may help you move your shoulder normally again and strengthen your rotator cuff  You may also learn other exercises, such as stretching and strengthening of your shoulder muscles  You may learn changes to make to your daily activities that will help decrease stress on your tendons  Contact your healthcare provider or orthopedist if:   · You have a fever  · You have pain and swelling in your shoulder even after you take pain medicine  · Your skin is itchy, swollen, or has a rash  · Your symptoms are not getting better or are getting worse  · You have questions or concerns about your condition or care  Return to the emergency department if:   · You have sudden shortness of breath or chest pain  · Any part of your arm is numb, tingly, cold, blue, or pale  © 2017 [employer]  Information is for End User's use only and may not be sold, redistributed or otherwise used for commercial purposes  All illustrations and images included in CareNotes® are the copyrighted property of [employer]  or [employer]  The above information is an  only  It is not intended as medical advice for individual conditions or treatments  Talk to your doctor, nurse or pharmacist before following any medical regimen to see if it is safe and effective for you  Tendinitis   WHAT YOU NEED TO KNOW:   Tendinitis is painful inflammation or breakdown of your tendons   It may also be called tendinopathy  Tendinitis often occurs in the knee, shoulder, ankle, hip, or elbow  DISCHARGE INSTRUCTIONS:   Medicines:   · Pain medicines  such as acetaminophen or NSAIDs may decrease swelling and pain or fever  These medicines are available without a doctor's order  Ask which medicine to take  Ask how much to take and when to take it  Follow directions  Acetaminophen and NSAIDs can cause liver or kidney damage if not taken correctly  If you take blood thinner medicine, always ask your healthcare provider if NSAIDs are safe for you  Always read the medicine label and follow the directions on it before using these medicine  · Take your medicine as directed  Contact your healthcare provider if you think your medicine is not helping or if you have side effects  Tell him if you are allergic to any medicine  Keep a list of the medicines, vitamins, and herbs you take  Include the amounts, and when and why you take them  Bring the list or the pill bottles to follow-up visits  Carry your medicine list with you in case of an emergency  Management:   · Rest  your tendon as directed to help it heal  Ask your healthcare provider if you need to stop putting weight on your affected area  · Ice  helps decrease swelling and pain  Ice may also help prevent tissue damage  Use an ice pack, or put crushed ice in a plastic bag  Cover it with a towel and place it on the affected area for 10 to 15 minutes every hour or as directed  · Support devices  such as a cane, splint, shoe insert, or brace may help reduce your pain  · Physical therapy  may be ordered by your healthcare provider  This may be used to teach you exercises to help improve movement and strength, and to decrease pain  You may also learn how to improve your posture, and how to lift or exercise correctly  Prevention:   · Stretch and warm up  before you exercise  · Exercise regularly  to strengthen the muscles around your joint   Ease into an exercise routine for the first 3 weeks to prevent another injury  Ask your healthcare provider about the best exercise plan for you  Rest fully between activities  · Use the right equipment  for sports and exercise  Wear braces or tape around weak joints as directed  Follow up with your healthcare provider as directed:  Write down your questions so you remember to ask them during your visits  Contact your healthcare provider if:   · You have increased pain even after you take medicine  · You have questions or concerns about your condition or care  Return to the emergency department if:   · You have increased redness over the joint, or swelling in the joint  · You suddenly cannot move your joint  © 2017 [employer]  Information is for End User's use only and may not be sold, redistributed or otherwise used for commercial purposes  All illustrations and images included in CareNotes® are the copyrighted property of [employer]  or [employer]  The above information is an  only  It is not intended as medical advice for individual conditions or treatments  Talk to your doctor, nurse or pharmacist before following any medical regimen to see if it is safe and effective for you

## 2019-03-08 LAB
ALBUMIN/CREAT UR: 611 MCG/MG CREAT
APPEARANCE UR: CLEAR
BACTERIA UR QL AUTO: ABNORMAL /HPF
BASOPHILS # BLD AUTO: 21 CELLS/UL (ref 0–200)
BASOPHILS NFR BLD AUTO: 0.4 %
BILIRUB UR QL STRIP: NEGATIVE
CALCIUM SERPL-MCNC: 9.1 MG/DL (ref 8.6–10.2)
COLOR UR: YELLOW
CREAT UR-MCNC: 106 MG/DL (ref 20–275)
EOSINOPHIL # BLD AUTO: 31 CELLS/UL (ref 15–500)
EOSINOPHIL NFR BLD AUTO: 0.6 %
ERYTHROCYTE [DISTWIDTH] IN BLOOD BY AUTOMATED COUNT: 14.8 % (ref 11–15)
GLUCOSE UR QL STRIP: NEGATIVE
HCT VFR BLD AUTO: 37.9 % (ref 35–45)
HGB BLD-MCNC: 12.9 G/DL (ref 11.7–15.5)
HGB UR QL STRIP: ABNORMAL
HYALINE CASTS #/AREA URNS LPF: ABNORMAL /LPF
KETONES UR QL STRIP: NEGATIVE
LEUKOCYTE ESTERASE UR QL STRIP: ABNORMAL
LYMPHOCYTES # BLD AUTO: 1186 CELLS/UL (ref 850–3900)
LYMPHOCYTES NFR BLD AUTO: 22.8 %
MCH RBC QN AUTO: 30.7 PG (ref 27–33)
MCHC RBC AUTO-ENTMCNC: 34 G/DL (ref 32–36)
MCV RBC AUTO: 90.2 FL (ref 80–100)
MICROALBUMIN UR-MCNC: 64.8 MG/DL
MONOCYTES # BLD AUTO: 478 CELLS/UL (ref 200–950)
MONOCYTES NFR BLD AUTO: 9.2 %
NEUTROPHILS # BLD AUTO: 3484 CELLS/UL (ref 1500–7800)
NEUTROPHILS NFR BLD AUTO: 67 %
NITRITE UR QL STRIP: NEGATIVE
PH UR STRIP: 5.5 [PH] (ref 5–8)
PLATELET # BLD AUTO: 277 THOUSAND/UL (ref 140–400)
PMV BLD REES-ECKER: 10.9 FL (ref 7.5–12.5)
PROT UR QL STRIP: ABNORMAL
PTH-INTACT SERPL-MCNC: 71 PG/ML (ref 14–64)
RBC # BLD AUTO: 4.2 MILLION/UL (ref 3.8–5.1)
RBC #/AREA URNS HPF: ABNORMAL /HPF
SP GR UR STRIP: 1.01 (ref 1–1.03)
SQUAMOUS #/AREA URNS HPF: ABNORMAL /HPF
WBC # BLD AUTO: 5.2 THOUSAND/UL (ref 3.8–10.8)
WBC #/AREA URNS HPF: ABNORMAL /HPF

## 2019-03-11 DIAGNOSIS — N18.4 CKD (CHRONIC KIDNEY DISEASE) STAGE 4, GFR 15-29 ML/MIN (HCC): Primary | ICD-10-CM

## 2019-03-12 ENCOUNTER — TELEPHONE (OUTPATIENT)
Dept: NEPHROLOGY | Facility: CLINIC | Age: 49
End: 2019-03-12

## 2019-03-12 LAB
ALBUMIN SERPL-MCNC: 4.1 G/DL (ref 3.6–5.1)
BUN SERPL-MCNC: 33 MG/DL (ref 7–25)
BUN/CREAT SERPL: 17 (CALC) (ref 6–22)
CALCIUM SERPL-MCNC: 9.4 MG/DL (ref 8.6–10.2)
CHLORIDE SERPL-SCNC: 107 MMOL/L (ref 98–110)
CO2 SERPL-SCNC: 25 MMOL/L (ref 20–32)
CREAT SERPL-MCNC: 1.97 MG/DL (ref 0.5–1.1)
GLUCOSE SERPL-MCNC: 124 MG/DL (ref 65–139)
PHOSPHATE SERPL-MCNC: 3.9 MG/DL (ref 2.5–4.5)
POTASSIUM SERPL-SCNC: 4.4 MMOL/L (ref 3.5–5.3)
SL AMB EGFR AFRICAN AMERICAN: 34 ML/MIN/1.73M2
SL AMB EGFR NON AFRICAN AMERICAN: 29 ML/MIN/1.73M2
SODIUM SERPL-SCNC: 139 MMOL/L (ref 135–146)

## 2019-03-12 NOTE — TELEPHONE ENCOUNTER
Spoke with patient  She has been made aware that additional blood work is needed prior to her appointment on 3/19  Renal function panel has been faxed to [facility], pt will have this done this week

## 2019-03-12 NOTE — TELEPHONE ENCOUNTER
----- Message from Denzil Fothergill, DO sent at 3/8/2019  2:41 PM EST -----  Unfortunately she does not have a renal function panel    Can we have her get one prior to her next appointment

## 2019-03-13 ENCOUNTER — OFFICE VISIT (OUTPATIENT)
Dept: FAMILY MEDICINE CLINIC | Facility: CLINIC | Age: 49
End: 2019-03-13

## 2019-03-13 VITALS
HEART RATE: 96 BPM | WEIGHT: 208.25 LBS | OXYGEN SATURATION: 97 % | HEIGHT: 61 IN | SYSTOLIC BLOOD PRESSURE: 110 MMHG | RESPIRATION RATE: 18 BRPM | DIASTOLIC BLOOD PRESSURE: 70 MMHG | TEMPERATURE: 98 F | BODY MASS INDEX: 39.32 KG/M2

## 2019-03-13 DIAGNOSIS — M25.50 ARTHRALGIA OF MULTIPLE JOINTS: ICD-10-CM

## 2019-03-13 DIAGNOSIS — G62.9 SMALL FIBER NEUROPATHY: ICD-10-CM

## 2019-03-13 DIAGNOSIS — R29.898 WEAKNESS OF BOTH LOWER EXTREMITIES: ICD-10-CM

## 2019-03-13 DIAGNOSIS — G47.411 CATAPLEXY: Primary | ICD-10-CM

## 2019-03-13 DIAGNOSIS — M31.30 WEGENER'S GRANULOMATOSIS: Chronic | ICD-10-CM

## 2019-03-13 PROCEDURE — 99213 OFFICE O/P EST LOW 20 MIN: CPT | Performed by: PHYSICIAN ASSISTANT
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
## 2019-03-18 ENCOUNTER — TELEPHONE (OUTPATIENT)
Dept: NEPHROLOGY | Facility: CLINIC | Age: 49
End: 2019-03-18

## 2019-03-18 DIAGNOSIS — IMO0001 IDDM (INSULIN DEPENDENT DIABETES MELLITUS): Chronic | ICD-10-CM

## 2019-03-19 ENCOUNTER — OFFICE VISIT (OUTPATIENT)
Dept: NEPHROLOGY | Facility: CLINIC | Age: 49
End: 2019-03-19
Payer: COMMERCIAL

## 2019-03-19 VITALS
HEART RATE: 84 BPM | HEIGHT: 61 IN | BODY MASS INDEX: 39.46 KG/M2 | WEIGHT: 209 LBS | DIASTOLIC BLOOD PRESSURE: 80 MMHG | SYSTOLIC BLOOD PRESSURE: 128 MMHG

## 2019-03-19 DIAGNOSIS — M31.30 WEGENER'S GRANULOMATOSIS: Chronic | ICD-10-CM

## 2019-03-19 DIAGNOSIS — I12.9 HYPERTENSIVE CHRONIC KIDNEY DISEASE WITH STAGE 1 THROUGH STAGE 4 CHRONIC KIDNEY DISEASE, OR UNSPECIFIED CHRONIC KIDNEY DISEASE: ICD-10-CM

## 2019-03-19 DIAGNOSIS — N18.4 CKD (CHRONIC KIDNEY DISEASE) STAGE 4, GFR 15-29 ML/MIN (HCC): Primary | ICD-10-CM

## 2019-03-19 DIAGNOSIS — E11.22 TYPE 2 DIABETES MELLITUS WITH STAGE 4 CHRONIC KIDNEY DISEASE, UNSPECIFIED WHETHER LONG TERM INSULIN USE (HCC): ICD-10-CM

## 2019-03-19 DIAGNOSIS — N18.4 TYPE 2 DIABETES MELLITUS WITH STAGE 4 CHRONIC KIDNEY DISEASE, UNSPECIFIED WHETHER LONG TERM INSULIN USE (HCC): ICD-10-CM

## 2019-03-19 PROCEDURE — 3066F NEPHROPATHY DOC TX: CPT | Performed by: INTERNAL MEDICINE

## 2019-03-19 PROCEDURE — 99214 OFFICE O/P EST MOD 30 MIN: CPT | Performed by: INTERNAL MEDICINE

## 2019-03-19 NOTE — PATIENT INSTRUCTIONS
ASSESSMENT and PLAN:  1  Chronic kidney disease, stage IV, baseline creatinine near 2 0, most recent GFR 29  2  Diabetes with likely associated nephropathy  3  History of ANCA associated vasculitis with positive anti-GBM, has been off immunosuppressive therapy since 2015  4  Lower extremity weakness, following with Neurology, repeat labs including ANCA titers are pending  5  Secondary hyperparathyroidism, PTH stable at 71    · Overall renal function remains fairly stable  · Blood pressure under good control, continue with current regimen  · Repeat ANCA is pending will re-presented requisition  · Follow-up in 3 months with repeat labs at that time

## 2019-03-19 NOTE — PROGRESS NOTES
NEPHROLOGY OUTPATIENT PROGRESS NOTE   Elina Fischer 50 y o  female MRN: [id number]  Reason for visit:  Chronic kidney disease    ASSESSMENT and PLAN:  1  Chronic kidney disease, stage IV, baseline creatinine near 2 0, most recent GFR 29  2  Diabetes with likely associated nephropathy  3  History of ANCA associated vasculitis with positive anti-GBM, has been off immunosuppressive therapy since 2015  4  Lower extremity weakness, following with Neurology, repeat labs including ANCA titers are pending  5  Secondary hyperparathyroidism, PTH stable at 71    · Overall renal function remains fairly stable  · Blood pressure under good control, continue with current regimen  · Repeat ANCA is pending will re-presented requisition  · Follow-up in 3 months with repeat labs at that time  SUBJECTIVE / INTERVAL HISTORY:  She has been doing okay  She does have persistent lower extremity weakness and has been following with Neurology  Otherwise denies any significant chest pain shortness of breath or swelling  Appetite has remained stable  Last hospitalization was in February secondary to abdominal pain which resolved  Review of Systems      OBJECTIVE:  /80 (BP Location: Right arm, Patient Position: Sitting, Cuff Size: Standard)   Pulse 84   Ht 5' 1" (1 549 m)   Wt 94 8 kg (209 lb)   LMP  (LMP Unknown)   BMI 39 49 kg/m²   Vitals:    03/19/19 1331   Weight: 94 8 kg (209 lb)       Physical Exam   Constitutional: She is oriented to person, place, and time  No distress  Eyes: No scleral icterus  Neck: Neck supple  Cardiovascular: Normal rate and regular rhythm  Pulmonary/Chest: Effort normal and breath sounds normal    Abdominal: Soft  She exhibits no distension  Musculoskeletal: She exhibits no edema  Neurological: She is alert and oriented to person, place, and time  Skin: Skin is warm and dry           Medications:    Current Outpatient Medications:     albuterol (VENTOLIN HFA) 90 mcg/act inhaler, Inhale 2 puffs every 6 (six) hours as needed for wheezing, Disp: 18 g, Rfl: 1    amLODIPine (NORVASC) 5 mg tablet, Take 1 tablet (5 mg total) by mouth daily, Disp: 90 tablet, Rfl: 3    amphetamine-dextroamphetamine (ADDERALL XR) 20 MG 24 hr capsule, Take 1 capsule (20 mg total) by mouth dailyMax Daily Amount: 20 mg, Disp: 60 capsule, Rfl: 0    cycloSPORINE (RESTASIS) 0 05 % ophthalmic emulsion, Administer 1 drop to both eyes 2 (two) times a day, Disp: , Rfl:     dicyclomine (BENTYL) 20 mg tablet, Take 1 tablet (20 mg total) by mouth every 6 (six) hours as needed (pain), Disp: 10 tablet, Rfl: 0    Elastic Bandages & Supports (THUMB SPLINT/LEFT MEDIUM) MISC, by Does not apply route daily, Disp: 1 each, Rfl: 0    famotidine (PEPCID) 20 mg tablet, Take 1 tablet (20 mg total) by mouth 2 (two) times a day, Disp: 180 tablet, Rfl: 1    gabapentin (NEURONTIN) 400 mg capsule, Take 1 capsule (400 mg total) by mouth 3 (three) times a day for 30 days, Disp: 90 capsule, Rfl: 3    glucose blood (FREESTYLE LITE) test strip, 1 each by Other route 3 (three) times a day, Disp: 100 each, Rfl: 5    Humidifiers (COOL MIST HUMIDIFIER 1 GALLON) MISC, by Does not apply route as needed (shortness of breath), Disp: 1 each, Rfl: 0    insulin aspart (NOVOLOG FLEXPEN) 100 Units/mL injection pen, Inject 6 Units under the skin 3 (three) times a day with meals, Disp: 5 pen, Rfl: 3    Insulin Glargine (TOUJEO MAX SOLOSTAR) 300 units/mL CONCETRATED U-300 injection pen, Inject 22 Units under the skin daily, Disp: 3 pen, Rfl: 0    Insulin Pen Needle (PEN NEEDLES) 31G X 8 MM MISC, Inject 1 Stick under the skin 4 (four) times a day (with meals and at bedtime), Disp: 100 each, Rfl: 6    Lancets (FREESTYLE) lancets, by Other route 3 (three) times a day, Disp: 100 each, Rfl: 11    lidocaine (XYLOCAINE) 5 % ointment, Apply topically as needed for mild pain, Disp: 240 g, Rfl: 2    MULTIPLE VITAMIN PO, Take 1 capsule by mouth daily, Disp: , Rfl:   ondansetron (ZOFRAN) 4 mg tablet, Take 1 tablet (4 mg total) by mouth every 8 (eight) hours as needed for nausea or vomiting, Disp: 30 tablet, Rfl: 0    ondansetron (ZOFRAN-ODT) 4 mg disintegrating tablet, Take 1 tablet (4 mg total) by mouth every 8 (eight) hours as needed for nausea or vomiting, Disp: 30 tablet, Rfl: 0    ondansetron (ZOFRAN-ODT) 4 mg disintegrating tablet, Take 1 tablet (4 mg total) by mouth every 8 (eight) hours as needed for nausea or vomiting, Disp: 30 tablet, Rfl: 0    oxyCODONE-acetaminophen (PERCOCET) 7 5-325 MG per tablet, Take 1 tablet by mouth every 6 (six) hours as needed (pain)Max Daily Amount: 4 tablets, Disp: 120 tablet, Rfl: 0    Probiotic Product (PROBIOTIC-10 PO), Take 1 tablet by mouth daily, Disp: , Rfl:     QUEtiapine (SEROquel) 100 mg tablet, Take 1 tablet (100 mg total) by mouth daily at bedtime, Disp: 90 tablet, Rfl: 0    simethicone (MYLICON) 885 MG chewable tablet, Chew 1 tablet (125 mg total) every 6 (six) hours as needed for flatulence, Disp: 40 tablet, Rfl: 0    TiZANidine (ZANAFLEX) 4 MG capsule, Take 1 capsule (4 mg total) by mouth 3 (three) times a day, Disp: 90 capsule, Rfl: 2    Salicylic Acid 26 % SOLN, Apply twice daily to the wart   (Patient not taking: Reported on 3/7/2019), Disp: 1 Bottle, Rfl: 5    Laboratory Results:  Results for orders placed or performed in visit on 03/12/19   Renal function panel   Result Value Ref Range    Glucose, Random 124 65 - 139 mg/dL    BUN 33 (H) 7 - 25 mg/dL    Creatinine 1 97 (H) 0 50 - 1 10 mg/dL    eGFR Non  29 (L) > OR = 60 mL/min/1 73m2    eGFR  34 (L) > OR = 60 mL/min/1 73m2    SL AMB BUN/CREATININE RATIO 17 6 - 22 (calc)    Sodium 139 135 - 146 mmol/L    Potassium 4 4 3 5 - 5 3 mmol/L    Chloride 107 98 - 110 mmol/L    CO2 25 20 - 32 mmol/L    SL AMB CALCIUM 9 4 8 6 - 10 2 mg/dL    Phosphorus, Serum 3 9 2 5 - 4 5 mg/dL    Albumin 4 1 3 6 - 5 1 g/dL

## 2019-03-24 LAB
ANA SER QL IF: NEGATIVE
ANCA AB SER QL: NEGATIVE
ERYTHROCYTE [SEDIMENTATION RATE] IN BLOOD BY WESTERGREN METHOD: 53 MM/H
MYELOPEROXIDASE AB SER IA-ACNC: <1 AI
PROTEINASE3 AB SER IA-ACNC: <1 AI
TSH SERPL-ACNC: 0.61 MIU/L
VIT B12 SERPL-MCNC: 844 PG/ML (ref 200–1100)
VIT B6 SERPL-MCNC: 17.9 NG/ML (ref 2.1–21.7)

## 2019-04-08 DIAGNOSIS — G89.4 CHRONIC PAIN SYNDROME: ICD-10-CM

## 2019-04-08 DIAGNOSIS — M31.30 WEGENER'S GRANULOMATOSIS: Chronic | ICD-10-CM

## 2019-04-08 DIAGNOSIS — F31.9 BIPOLAR 1 DISORDER (HCC): ICD-10-CM

## 2019-04-10 DIAGNOSIS — G89.4 CHRONIC PAIN SYNDROME: ICD-10-CM

## 2019-04-10 DIAGNOSIS — M31.30 WEGENER'S GRANULOMATOSIS: Chronic | ICD-10-CM

## 2019-04-10 RX ORDER — OXYCODONE AND ACETAMINOPHEN 7.5; 325 MG/1; MG/1
1 TABLET ORAL EVERY 6 HOURS PRN
Qty: 120 TABLET | Refills: 0 | OUTPATIENT
Start: 2019-04-10

## 2019-04-10 RX ORDER — OXYCODONE AND ACETAMINOPHEN 7.5; 325 MG/1; MG/1
1 TABLET ORAL EVERY 6 HOURS PRN
Qty: 120 TABLET | Refills: 0 | Status: SHIPPED | OUTPATIENT
Start: 2019-04-10 | End: 2019-05-06 | Stop reason: SDUPTHER

## 2019-04-10 RX ORDER — DEXTROAMPHETAMINE SACCHARATE, AMPHETAMINE ASPARTATE MONOHYDRATE, DEXTROAMPHETAMINE SULFATE AND AMPHETAMINE SULFATE 5; 5; 5; 5 MG/1; MG/1; MG/1; MG/1
20 CAPSULE, EXTENDED RELEASE ORAL DAILY
Qty: 60 CAPSULE | Refills: 0 | Status: SHIPPED | OUTPATIENT
Start: 2019-04-10 | End: 2019-06-06 | Stop reason: SDUPTHER

## 2019-04-16 DIAGNOSIS — IMO0001 IDDM (INSULIN DEPENDENT DIABETES MELLITUS): Chronic | ICD-10-CM

## 2019-04-17 RX ORDER — LANCETS 28 GAUGE
EACH MISCELLANEOUS 3 TIMES DAILY
Qty: 100 EACH | Refills: 5 | Status: SHIPPED | OUTPATIENT
Start: 2019-04-17 | End: 2019-10-07 | Stop reason: SDUPTHER

## 2019-04-24 DIAGNOSIS — R10.13 EPIGASTRIC PAIN: ICD-10-CM

## 2019-04-25 RX ORDER — ONDANSETRON 4 MG/1
4 TABLET, ORALLY DISINTEGRATING ORAL EVERY 8 HOURS PRN
Qty: 30 TABLET | Refills: 0 | Status: SHIPPED | OUTPATIENT
Start: 2019-04-25 | End: 2019-06-12 | Stop reason: SDUPTHER

## 2019-05-06 DIAGNOSIS — F31.9 BIPOLAR 1 DISORDER (HCC): ICD-10-CM

## 2019-05-06 DIAGNOSIS — M31.30 WEGENER'S GRANULOMATOSIS: Chronic | ICD-10-CM

## 2019-05-06 DIAGNOSIS — G89.4 CHRONIC PAIN SYNDROME: ICD-10-CM

## 2019-05-07 RX ORDER — OXYCODONE AND ACETAMINOPHEN 7.5; 325 MG/1; MG/1
1 TABLET ORAL EVERY 6 HOURS PRN
Qty: 120 TABLET | Refills: 0 | Status: SHIPPED | OUTPATIENT
Start: 2019-05-07 | End: 2019-06-06 | Stop reason: SDUPTHER

## 2019-05-07 RX ORDER — QUETIAPINE FUMARATE 100 MG/1
100 TABLET, FILM COATED ORAL
Qty: 90 TABLET | Refills: 1 | Status: SHIPPED | OUTPATIENT
Start: 2019-05-07 | End: 2020-02-28 | Stop reason: SDUPTHER

## 2019-05-14 ENCOUNTER — TELEPHONE (OUTPATIENT)
Dept: FAMILY MEDICINE CLINIC | Facility: CLINIC | Age: 49
End: 2019-05-14

## 2019-05-15 DIAGNOSIS — F31.9 BIPOLAR 1 DISORDER (HCC): Primary | ICD-10-CM

## 2019-05-21 ENCOUNTER — OFFICE VISIT (OUTPATIENT)
Dept: FAMILY MEDICINE CLINIC | Facility: CLINIC | Age: 49
End: 2019-05-21

## 2019-05-21 VITALS
HEART RATE: 72 BPM | TEMPERATURE: 96.7 F | SYSTOLIC BLOOD PRESSURE: 110 MMHG | DIASTOLIC BLOOD PRESSURE: 70 MMHG | RESPIRATION RATE: 20 BRPM | OXYGEN SATURATION: 96 %

## 2019-05-21 DIAGNOSIS — N28.89 HYPERTENSION SECONDARY TO OTHER RENAL DISORDERS: Chronic | ICD-10-CM

## 2019-05-21 DIAGNOSIS — I15.1 HYPERTENSION SECONDARY TO OTHER RENAL DISORDERS: Chronic | ICD-10-CM

## 2019-05-21 DIAGNOSIS — E11.22 TYPE 2 DIABETES MELLITUS WITH STAGE 4 CHRONIC KIDNEY DISEASE, UNSPECIFIED WHETHER LONG TERM INSULIN USE (HCC): Primary | ICD-10-CM

## 2019-05-21 DIAGNOSIS — N18.4 TYPE 2 DIABETES MELLITUS WITH STAGE 4 CHRONIC KIDNEY DISEASE, UNSPECIFIED WHETHER LONG TERM INSULIN USE (HCC): Primary | ICD-10-CM

## 2019-05-21 DIAGNOSIS — F11.90 CHRONIC NARCOTIC USE: ICD-10-CM

## 2019-05-21 DIAGNOSIS — IMO0001 IDDM (INSULIN DEPENDENT DIABETES MELLITUS): Chronic | ICD-10-CM

## 2019-05-21 DIAGNOSIS — G89.4 CHRONIC PAIN SYNDROME: ICD-10-CM

## 2019-05-21 DIAGNOSIS — M54.41 CHRONIC LOW BACK PAIN WITH BILATERAL SCIATICA, UNSPECIFIED BACK PAIN LATERALITY: ICD-10-CM

## 2019-05-21 DIAGNOSIS — R25.2 MUSCLE CRAMPS: ICD-10-CM

## 2019-05-21 DIAGNOSIS — R43.1 HYPEROSMIA: ICD-10-CM

## 2019-05-21 DIAGNOSIS — M54.42 CHRONIC LOW BACK PAIN WITH BILATERAL SCIATICA, UNSPECIFIED BACK PAIN LATERALITY: ICD-10-CM

## 2019-05-21 DIAGNOSIS — G89.29 CHRONIC LOW BACK PAIN WITH BILATERAL SCIATICA, UNSPECIFIED BACK PAIN LATERALITY: ICD-10-CM

## 2019-05-21 DIAGNOSIS — G62.9 SMALL FIBER NEUROPATHY: ICD-10-CM

## 2019-05-21 DIAGNOSIS — R29.898 WEAKNESS OF BOTH LOWER EXTREMITIES: ICD-10-CM

## 2019-05-21 LAB — SL AMB POCT HEMOGLOBIN AIC: 6.3 (ref ?–6.5)

## 2019-05-21 PROCEDURE — 83036 HEMOGLOBIN GLYCOSYLATED A1C: CPT | Performed by: PHYSICIAN ASSISTANT

## 2019-05-21 PROCEDURE — 3044F HG A1C LEVEL LT 7.0%: CPT | Performed by: PHYSICIAN ASSISTANT

## 2019-05-21 PROCEDURE — 99214 OFFICE O/P EST MOD 30 MIN: CPT | Performed by: PHYSICIAN ASSISTANT

## 2019-05-21 RX ORDER — BLOOD-GLUCOSE METER
KIT MISCELLANEOUS DAILY
Qty: 1 EACH | Refills: 0 | Status: SHIPPED | OUTPATIENT
Start: 2019-05-21 | End: 2021-07-23 | Stop reason: SDUPTHER

## 2019-05-21 RX ORDER — TIZANIDINE HYDROCHLORIDE 4 MG/1
4 CAPSULE, GELATIN COATED ORAL 3 TIMES DAILY
Qty: 90 CAPSULE | Refills: 2 | Status: SHIPPED | OUTPATIENT
Start: 2019-05-21 | End: 2019-09-23 | Stop reason: SDUPTHER

## 2019-05-21 RX ORDER — GABAPENTIN 400 MG/1
400 CAPSULE ORAL 3 TIMES DAILY
Qty: 90 CAPSULE | Refills: 3 | Status: SHIPPED | OUTPATIENT
Start: 2019-05-21 | End: 2020-01-27

## 2019-05-28 ENCOUNTER — TELEPHONE (OUTPATIENT)
Dept: FAMILY MEDICINE CLINIC | Facility: CLINIC | Age: 49
End: 2019-05-28

## 2019-05-29 ENCOUNTER — TELEPHONE (OUTPATIENT)
Dept: FAMILY MEDICINE CLINIC | Facility: CLINIC | Age: 49
End: 2019-05-29

## 2019-05-29 LAB
ALBUMIN SERPL-MCNC: 4 G/DL (ref 3.6–5.1)
ALBUMIN/GLOB SERPL: 1.2 (CALC) (ref 1–2.5)
ALP SERPL-CCNC: 116 U/L (ref 33–115)
ALT SERPL-CCNC: 20 U/L (ref 6–29)
AST SERPL-CCNC: 20 U/L (ref 10–35)
BILIRUB SERPL-MCNC: 0.3 MG/DL (ref 0.2–1.2)
BUN SERPL-MCNC: 27 MG/DL (ref 7–25)
BUN/CREAT SERPL: 13 (CALC) (ref 6–22)
CALCIUM SERPL-MCNC: 9.2 MG/DL (ref 8.6–10.2)
CHLORIDE SERPL-SCNC: 109 MMOL/L (ref 98–110)
CO2 SERPL-SCNC: 21 MMOL/L (ref 20–32)
CREAT SERPL-MCNC: 2.07 MG/DL (ref 0.5–1.1)
GLOBULIN SER CALC-MCNC: 3.4 G/DL (CALC) (ref 1.9–3.7)
GLUCOSE SERPL-MCNC: 108 MG/DL (ref 65–99)
MAGNESIUM SERPL-MCNC: 2.1 MG/DL (ref 1.5–2.5)
POTASSIUM SERPL-SCNC: 4.4 MMOL/L (ref 3.5–5.3)
PROT SERPL-MCNC: 7.4 G/DL (ref 6.1–8.1)
SL AMB EGFR AFRICAN AMERICAN: 32 ML/MIN/1.73M2
SL AMB EGFR NON AFRICAN AMERICAN: 28 ML/MIN/1.73M2
SODIUM SERPL-SCNC: 142 MMOL/L (ref 135–146)

## 2019-06-03 ENCOUNTER — OFFICE VISIT (OUTPATIENT)
Dept: NEUROLOGY | Facility: CLINIC | Age: 49
End: 2019-06-03
Payer: COMMERCIAL

## 2019-06-03 VITALS
HEART RATE: 78 BPM | HEIGHT: 61 IN | WEIGHT: 200 LBS | DIASTOLIC BLOOD PRESSURE: 60 MMHG | SYSTOLIC BLOOD PRESSURE: 126 MMHG | BODY MASS INDEX: 37.76 KG/M2

## 2019-06-03 DIAGNOSIS — R43.1 HYPEROSMIA: ICD-10-CM

## 2019-06-03 DIAGNOSIS — R29.898 WEAKNESS OF BOTH UPPER EXTREMITIES: Primary | ICD-10-CM

## 2019-06-03 DIAGNOSIS — R43.9 SMELL DISTURBANCE: ICD-10-CM

## 2019-06-03 PROCEDURE — 99215 OFFICE O/P EST HI 40 MIN: CPT | Performed by: NURSE PRACTITIONER

## 2019-06-06 DIAGNOSIS — G89.4 CHRONIC PAIN SYNDROME: ICD-10-CM

## 2019-06-06 DIAGNOSIS — F31.9 BIPOLAR 1 DISORDER (HCC): ICD-10-CM

## 2019-06-06 DIAGNOSIS — M31.30 WEGENER'S GRANULOMATOSIS: Chronic | ICD-10-CM

## 2019-06-07 ENCOUNTER — TELEPHONE (OUTPATIENT)
Dept: FAMILY MEDICINE CLINIC | Facility: CLINIC | Age: 49
End: 2019-06-07

## 2019-06-07 DIAGNOSIS — E11.22 TYPE 2 DIABETES MELLITUS WITH STAGE 4 CHRONIC KIDNEY DISEASE, UNSPECIFIED WHETHER LONG TERM INSULIN USE (HCC): Primary | ICD-10-CM

## 2019-06-07 DIAGNOSIS — N18.4 TYPE 2 DIABETES MELLITUS WITH STAGE 4 CHRONIC KIDNEY DISEASE, UNSPECIFIED WHETHER LONG TERM INSULIN USE (HCC): Primary | ICD-10-CM

## 2019-06-07 RX ORDER — OXYCODONE AND ACETAMINOPHEN 7.5; 325 MG/1; MG/1
1 TABLET ORAL EVERY 6 HOURS PRN
Qty: 120 TABLET | Refills: 0 | Status: SHIPPED | OUTPATIENT
Start: 2019-06-07 | End: 2019-07-05 | Stop reason: SDUPTHER

## 2019-06-07 RX ORDER — BLOOD SUGAR DIAGNOSTIC
STRIP MISCELLANEOUS 4 TIMES DAILY
Qty: 400 EACH | Refills: 3 | Status: SHIPPED | OUTPATIENT
Start: 2019-06-07 | End: 2021-10-30 | Stop reason: SDUPTHER

## 2019-06-07 RX ORDER — DEXTROAMPHETAMINE SACCHARATE, AMPHETAMINE ASPARTATE MONOHYDRATE, DEXTROAMPHETAMINE SULFATE AND AMPHETAMINE SULFATE 5; 5; 5; 5 MG/1; MG/1; MG/1; MG/1
20 CAPSULE, EXTENDED RELEASE ORAL DAILY
Qty: 30 CAPSULE | Refills: 0 | Status: SHIPPED | OUTPATIENT
Start: 2019-06-07 | End: 2019-06-14 | Stop reason: SDUPTHER

## 2019-06-10 ENCOUNTER — TELEPHONE (OUTPATIENT)
Dept: FAMILY MEDICINE CLINIC | Facility: CLINIC | Age: 49
End: 2019-06-10

## 2019-06-10 ENCOUNTER — TELEPHONE (OUTPATIENT)
Dept: NEPHROLOGY | Facility: CLINIC | Age: 49
End: 2019-06-10

## 2019-06-11 ENCOUNTER — OFFICE VISIT (OUTPATIENT)
Dept: NEPHROLOGY | Facility: CLINIC | Age: 49
End: 2019-06-11
Payer: COMMERCIAL

## 2019-06-11 VITALS — HEART RATE: 80 BPM | RESPIRATION RATE: 16 BRPM | SYSTOLIC BLOOD PRESSURE: 130 MMHG | DIASTOLIC BLOOD PRESSURE: 82 MMHG

## 2019-06-11 DIAGNOSIS — N18.4 CKD (CHRONIC KIDNEY DISEASE) STAGE 4, GFR 15-29 ML/MIN (HCC): Primary | ICD-10-CM

## 2019-06-11 DIAGNOSIS — N18.4 TYPE 2 DIABETES MELLITUS WITH STAGE 4 CHRONIC KIDNEY DISEASE, UNSPECIFIED WHETHER LONG TERM INSULIN USE (HCC): ICD-10-CM

## 2019-06-11 DIAGNOSIS — E11.22 TYPE 2 DIABETES MELLITUS WITH STAGE 4 CHRONIC KIDNEY DISEASE, UNSPECIFIED WHETHER LONG TERM INSULIN USE (HCC): ICD-10-CM

## 2019-06-11 DIAGNOSIS — M31.7 MPA (MICROSCOPIC POLYANGIITIS) (HCC): Chronic | ICD-10-CM

## 2019-06-11 DIAGNOSIS — R80.1 PERSISTENT PROTEINURIA: ICD-10-CM

## 2019-06-11 DIAGNOSIS — D63.8 ANEMIA OF CHRONIC DISEASE: ICD-10-CM

## 2019-06-11 LAB
ALBUMIN SERPL-MCNC: 3.8 G/DL (ref 3.6–5.1)
ALBUMIN/CREAT UR: 490 MCG/MG CREAT
APPEARANCE UR: CLEAR
BACTERIA UR QL AUTO: ABNORMAL /HPF
BASOPHILS # BLD AUTO: 31 CELLS/UL (ref 0–200)
BASOPHILS NFR BLD AUTO: 0.6 %
BILIRUB UR QL STRIP: NEGATIVE
BUN SERPL-MCNC: 34 MG/DL (ref 7–25)
BUN/CREAT SERPL: 17 (CALC) (ref 6–22)
CALCIUM SERPL-MCNC: 8.5 MG/DL (ref 8.6–10.2)
CHLORIDE SERPL-SCNC: 110 MMOL/L (ref 98–110)
CO2 SERPL-SCNC: 25 MMOL/L (ref 20–32)
COLOR UR: YELLOW
CREAT SERPL-MCNC: 2 MG/DL (ref 0.5–1.1)
CREAT UR-MCNC: 130 MG/DL (ref 20–275)
EOSINOPHIL # BLD AUTO: 73 CELLS/UL (ref 15–500)
EOSINOPHIL NFR BLD AUTO: 1.4 %
ERYTHROCYTE [DISTWIDTH] IN BLOOD BY AUTOMATED COUNT: 13.6 % (ref 11–15)
GLUCOSE SERPL-MCNC: 86 MG/DL (ref 65–99)
GLUCOSE UR QL STRIP: NEGATIVE
HCT VFR BLD AUTO: 36.2 % (ref 35–45)
HGB BLD-MCNC: 12.1 G/DL (ref 11.7–15.5)
HGB UR QL STRIP: NEGATIVE
HYALINE CASTS #/AREA URNS LPF: ABNORMAL /LPF
KETONES UR QL STRIP: NEGATIVE
LEUKOCYTE ESTERASE UR QL STRIP: NEGATIVE
LYMPHOCYTES # BLD AUTO: 1799 CELLS/UL (ref 850–3900)
LYMPHOCYTES NFR BLD AUTO: 34.6 %
MCH RBC QN AUTO: 30.6 PG (ref 27–33)
MCHC RBC AUTO-ENTMCNC: 33.4 G/DL (ref 32–36)
MCV RBC AUTO: 91.4 FL (ref 80–100)
MICROALBUMIN UR-MCNC: 63.7 MG/DL
MONOCYTES # BLD AUTO: 692 CELLS/UL (ref 200–950)
MONOCYTES NFR BLD AUTO: 13.3 %
NEUTROPHILS # BLD AUTO: 2605 CELLS/UL (ref 1500–7800)
NEUTROPHILS NFR BLD AUTO: 50.1 %
NITRITE UR QL STRIP: NEGATIVE
PH UR STRIP: 5.5 [PH] (ref 5–8)
PHOSPHATE SERPL-MCNC: 3.7 MG/DL (ref 2.5–4.5)
PLATELET # BLD AUTO: 243 THOUSAND/UL (ref 140–400)
PMV BLD REES-ECKER: 10.8 FL (ref 7.5–12.5)
POTASSIUM SERPL-SCNC: 4.4 MMOL/L (ref 3.5–5.3)
PROT UR QL STRIP: ABNORMAL
RBC # BLD AUTO: 3.96 MILLION/UL (ref 3.8–5.1)
RBC #/AREA URNS HPF: ABNORMAL /HPF
SL AMB EGFR AFRICAN AMERICAN: 33 ML/MIN/1.73M2
SL AMB EGFR NON AFRICAN AMERICAN: 29 ML/MIN/1.73M2
SODIUM SERPL-SCNC: 140 MMOL/L (ref 135–146)
SP GR UR STRIP: 1.02 (ref 1–1.03)
SQUAMOUS #/AREA URNS HPF: ABNORMAL /HPF
URATE SERPL-MCNC: 8.9 MG/DL (ref 2.5–7)
WBC # BLD AUTO: 5.2 THOUSAND/UL (ref 3.8–10.8)
WBC #/AREA URNS HPF: ABNORMAL /HPF

## 2019-06-11 PROCEDURE — 99214 OFFICE O/P EST MOD 30 MIN: CPT | Performed by: INTERNAL MEDICINE

## 2019-06-11 PROCEDURE — 3066F NEPHROPATHY DOC TX: CPT | Performed by: INTERNAL MEDICINE

## 2019-06-12 DIAGNOSIS — R10.13 EPIGASTRIC PAIN: ICD-10-CM

## 2019-06-12 RX ORDER — ONDANSETRON 4 MG/1
4 TABLET, ORALLY DISINTEGRATING ORAL EVERY 8 HOURS PRN
Qty: 30 TABLET | Refills: 0 | Status: SHIPPED | OUTPATIENT
Start: 2019-06-12 | End: 2019-10-22 | Stop reason: SDUPTHER

## 2019-06-13 ENCOUNTER — TELEPHONE (OUTPATIENT)
Dept: FAMILY MEDICINE CLINIC | Facility: CLINIC | Age: 49
End: 2019-06-13

## 2019-06-14 DIAGNOSIS — G47.411 CATAPLEXY: Primary | ICD-10-CM

## 2019-06-14 DIAGNOSIS — F31.9 BIPOLAR 1 DISORDER (HCC): ICD-10-CM

## 2019-06-14 DIAGNOSIS — R29.898 WEAKNESS OF BOTH LOWER EXTREMITIES: ICD-10-CM

## 2019-06-14 RX ORDER — DEXTROAMPHETAMINE SACCHARATE, AMPHETAMINE ASPARTATE MONOHYDRATE, DEXTROAMPHETAMINE SULFATE AND AMPHETAMINE SULFATE 5; 5; 5; 5 MG/1; MG/1; MG/1; MG/1
20 CAPSULE, EXTENDED RELEASE ORAL 2 TIMES DAILY
Qty: 60 CAPSULE | Refills: 0 | Status: SHIPPED | OUTPATIENT
Start: 2019-06-14 | End: 2019-07-22 | Stop reason: SDUPTHER

## 2019-06-17 ENCOUNTER — TELEPHONE (OUTPATIENT)
Dept: FAMILY MEDICINE CLINIC | Facility: CLINIC | Age: 49
End: 2019-06-17

## 2019-06-18 ENCOUNTER — HOSPITAL ENCOUNTER (OUTPATIENT)
Dept: MRI IMAGING | Facility: HOSPITAL | Age: 49
Discharge: HOME | End: 2019-06-18
Payer: COMMERCIAL

## 2019-06-18 DIAGNOSIS — R43.9 SMELL DISTURBANCE: ICD-10-CM

## 2019-06-18 DIAGNOSIS — R29.898 WEAKNESS OF BOTH UPPER EXTREMITIES: ICD-10-CM

## 2019-06-18 DIAGNOSIS — R43.1 HYPEROSMIA: ICD-10-CM

## 2019-06-18 PROCEDURE — 70551 MRI BRAIN STEM W/O DYE: CPT

## 2019-06-19 ENCOUNTER — TELEPHONE (OUTPATIENT)
Dept: FAMILY MEDICINE CLINIC | Facility: CLINIC | Age: 49
End: 2019-06-19

## 2019-06-20 ENCOUNTER — TELEPHONE (OUTPATIENT)
Dept: NEUROLOGY | Facility: CLINIC | Age: 49
End: 2019-06-20

## 2019-06-20 ENCOUNTER — TELEPHONE (OUTPATIENT)
Dept: FAMILY MEDICINE CLINIC | Facility: CLINIC | Age: 49
End: 2019-06-20

## 2019-06-21 ENCOUNTER — TELEPHONE (OUTPATIENT)
Dept: FAMILY MEDICINE CLINIC | Facility: CLINIC | Age: 49
End: 2019-06-21

## 2019-06-24 ENCOUNTER — TELEPHONE (OUTPATIENT)
Dept: FAMILY MEDICINE CLINIC | Facility: CLINIC | Age: 49
End: 2019-06-24

## 2019-06-25 ENCOUNTER — TELEPHONE (OUTPATIENT)
Dept: FAMILY MEDICINE CLINIC | Facility: CLINIC | Age: 49
End: 2019-06-25

## 2019-06-25 NOTE — TELEPHONE ENCOUNTER
Will try and do a prior auth  If it is not approved, she will not be able to get a refill until 7/6/2019

## 2019-06-25 NOTE — TELEPHONE ENCOUNTER
Please be advised     Pt has confirmed it is indeed a prior auth pt needs for Aderall  PT has had extensive back and forth regarding authorization, please inform me once done so I can get back to her as soon as possible

## 2019-06-26 ENCOUNTER — TELEPHONE (OUTPATIENT)
Dept: FAMILY MEDICINE CLINIC | Facility: CLINIC | Age: 49
End: 2019-06-26

## 2019-06-27 ENCOUNTER — TELEPHONE (OUTPATIENT)
Dept: FAMILY MEDICINE CLINIC | Facility: CLINIC | Age: 49
End: 2019-06-27
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
## 2019-06-27 NOTE — TELEPHONE ENCOUNTER
I can send refills of medications over the pharmacy, however I cannot send refills over for controlled substance  According to narcotic contract even with incident reports medications cannot be refilled until they are due

## 2019-06-29 ENCOUNTER — TELEPHONE (OUTPATIENT)
Dept: OTHER | Facility: OTHER | Age: 49
End: 2019-06-29

## 2019-07-05 DIAGNOSIS — M31.30 WEGENER'S GRANULOMATOSIS: Chronic | ICD-10-CM

## 2019-07-05 DIAGNOSIS — G89.4 CHRONIC PAIN SYNDROME: ICD-10-CM

## 2019-07-05 DIAGNOSIS — R43.0 ANOSMIA: Primary | ICD-10-CM

## 2019-07-05 RX ORDER — OXYCODONE AND ACETAMINOPHEN 7.5; 325 MG/1; MG/1
1 TABLET ORAL EVERY 6 HOURS PRN
Qty: 120 TABLET | Refills: 0 | Status: SHIPPED | OUTPATIENT
Start: 2019-07-05 | End: 2019-07-31 | Stop reason: SDUPTHER

## 2019-07-10 DIAGNOSIS — R10.13 EPIGASTRIC PAIN: ICD-10-CM

## 2019-07-10 RX ORDER — FAMOTIDINE 20 MG/1
20 TABLET, FILM COATED ORAL 2 TIMES DAILY
Qty: 180 TABLET | Refills: 0 | Status: SHIPPED | OUTPATIENT
Start: 2019-07-10 | End: 2019-11-11 | Stop reason: SDUPTHER

## 2019-07-11 ENCOUNTER — OFFICE VISIT (OUTPATIENT)
Dept: AUDIOLOGY | Facility: CLINIC | Age: 49
End: 2019-07-11
Payer: COMMERCIAL

## 2019-07-11 ENCOUNTER — OFFICE VISIT (OUTPATIENT)
Dept: OTOLARYNGOLOGY | Facility: CLINIC | Age: 49
End: 2019-07-11
Payer: COMMERCIAL

## 2019-07-11 VITALS
BODY MASS INDEX: 36.8 KG/M2 | SYSTOLIC BLOOD PRESSURE: 131 MMHG | RESPIRATION RATE: 17 BRPM | HEART RATE: 78 BPM | WEIGHT: 200 LBS | DIASTOLIC BLOOD PRESSURE: 78 MMHG | HEIGHT: 62 IN

## 2019-07-11 DIAGNOSIS — N18.4 CKD (CHRONIC KIDNEY DISEASE) STAGE 4, GFR 15-29 ML/MIN (HCC): ICD-10-CM

## 2019-07-11 DIAGNOSIS — M31.31 WEGENER'S GRANULOMATOSIS WITH RENAL INVOLVEMENT (HCC): ICD-10-CM

## 2019-07-11 DIAGNOSIS — R43.1 PAROSMIA: Primary | ICD-10-CM

## 2019-07-11 DIAGNOSIS — IMO0001 ASYMMETRICAL HEARING LOSS, BILATERAL: Primary | ICD-10-CM

## 2019-07-11 PROCEDURE — 92567 TYMPANOMETRY: CPT | Performed by: AUDIOLOGIST

## 2019-07-11 PROCEDURE — 92557 COMPREHENSIVE HEARING TEST: CPT | Performed by: AUDIOLOGIST

## 2019-07-11 PROCEDURE — 99204 OFFICE O/P NEW MOD 45 MIN: CPT | Performed by: OTOLARYNGOLOGY

## 2019-07-11 PROCEDURE — 3066F NEPHROPATHY DOC TX: CPT | Performed by: OTOLARYNGOLOGY

## 2019-07-11 RX ORDER — FLUTICASONE PROPIONATE 50 MCG
1 SPRAY, SUSPENSION (ML) NASAL EVERY 12 HOURS SCHEDULED
Qty: 1 BOTTLE | Refills: 1 | Status: SHIPPED | OUTPATIENT
Start: 2019-07-11 | End: 2019-07-15

## 2019-07-11 NOTE — PROGRESS NOTES
Otolaryngology Head and Neck Surgery History and Physical    Chief complaint    Chief Complaint   Patient presents with    Sinus Pressure        History of the Present Illness    Nancy Wood is a 50 y o  who reports that she has been having approximately 6 months history of increased sense of smell, with distortion of the smells  This has had apparently negative impact on her tolerance to usual smells and also causes interference on her eating habits  She describes garlic is disgusting", she used to be able to eat foods with garlic  Now she does not tolerate them  She reports mild nasal obstruction  Of note she has a CKD stage 3, history of Anca associated vasculitis, most recent blood test were  Negative  Review of Systems   Constitutional: Positive for fatigue  HENT: Positive for sinus pressure and sinus pain  Respiratory: Positive for chest tightness and shortness of breath  Cardiovascular: Negative  Gastrointestinal: Positive for constipation  Endocrine: Negative  Genitourinary: Negative  Musculoskeletal: Negative  Skin: Negative  Allergic/Immunologic: Negative  Neurological: Positive for dizziness and headaches  Hematological: Negative  Psychiatric/Behavioral: Negative          Past Medical History:   Diagnosis Date    ADHD     Anemia of chronic disease     Anxiety     Asthma     Asthma     Borderline personality disorder (HCC)     Cataplexy     Chronic abdominal pain     CKD (chronic kidney disease) stage 3, GFR 30-59 ml/min (HCC)     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HCC)     Cushing syndrome ([address]     Diabetes mellitus ([address] )     DVT (deep venous thrombosis) ([address] )     History of acute pancreatitis     felt secondary to Bactrim    HTN (hypertension)     Hypertension     Liver disease     fatty liver    Microscopic polyangiitis (HCC)     Morbid obesity (HCC)     MPA (microscopic polyangiitis) (HCC)     Ovarian cyst     PTSD (post-traumatic stress disorder)  Renal disorder     Self-inflicted injury     self inflicted skin wounds    Wegener's granulomatosis with renal involvement ([facility] )        Past Surgical History:   Procedure Laterality Date    ESOPHAGOGASTRODUODENOSCOPY  2015    mild antral gastritis    IA COLONOSCOPY FLX DX W/COLLJ SPEC WHEN PFRMD N/A 2018    Procedure: COLONOSCOPY with polypectomy;  Surgeon: Chavez Yang MD;  Location: [facility]; Service: Gastroenterology    IA ESOPHAGOGASTRODUODENOSCOPY TRANSORAL DIAGNOSTIC N/A 2018    Procedure: ESOPHAGOGASTRODUODENOSCOPY (EGD) with biopsy;  Surgeon: Chavez Yang MD;  Location: [facility];   Service: Gastroenterology    RELEASE SCAR CONTRACTURE / GRAFT REPAIRS OF HAND         Social History     Socioeconomic History    Marital status: Single     Spouse name: Not on file    Number of children: Not on file    Years of education: Not on file    Highest education level: Not on file   Occupational History    Occupation: disability   Social Needs    Financial resource strain: Not on file    Food insecurity:     Worry: Not on file     Inability: Not on file    Transportation needs:     Medical: Not on file     Non-medical: Not on file   Tobacco Use    Smoking status: Former Smoker     Types: Cigarettes     Last attempt to quit: 2010     Years since quittin 5    Smokeless tobacco: Former User   Substance and Sexual Activity    Alcohol use: No    Drug use: Yes     Types: Marijuana    Sexual activity: Never   Lifestyle    Physical activity:     Days per week: Not on file     Minutes per session: Not on file    Stress: Not on file   Relationships    Social connections:     Talks on phone: Not on file     Gets together: Not on file     Attends [employer] service: Not on file     Active member of club or organization: Not on file     Attends meetings of clubs or organizations: Not on file     Relationship status: Not on file    Intimate partner violence:     Fear of current or ex partner: Not on file     Emotionally abused: Not on file     Physically abused: Not on file     Forced sexual activity: Not on file   Other Topics Concern    Not on file   Social History Narrative    Social hx reviewed     No pref on Yazidi beliefs     Daily caffeine consumption 1 serving/day       Family History   Problem Relation Age of Onset    No Known Problems Mother     No Known Problems Father     Colon cancer Neg Hx     Drug abuse Neg Hx         mother father    Mental illness Neg Hx         disorder, mother father           /78 (BP Location: Right arm, Patient Position: Sitting, Cuff Size: Standard)   Pulse 78   Resp 17   Ht 5' 2" (1 575 m)   Wt 90 7 kg (200 lb)   LMP  (LMP Unknown)   BMI 36 58 kg/m²       Current Outpatient Medications:     albuterol (VENTOLIN HFA) 90 mcg/act inhaler, Inhale 2 puffs every 6 (six) hours as needed for wheezing, Disp: 18 g, Rfl: 1    Alcohol Swabs (ALCOHOL PADS) 70 % PADS, by Does not apply route 4 (four) times a day, Disp: 400 each, Rfl: 3    amLODIPine (NORVASC) 5 mg tablet, Take 1 tablet (5 mg total) by mouth daily, Disp: 90 tablet, Rfl: 3    amphetamine-dextroamphetamine (ADDERALL XR) 20 MG 24 hr capsule, Take 1 capsule (20 mg total) by mouth 2 (two) times a dayMax Daily Amount: 40 mg, Disp: 60 capsule, Rfl: 0    Blood Glucose Monitoring Suppl (FREESTYLE LITE) DEIRDRE, by Does not apply route daily, Disp: 1 each, Rfl: 0    cycloSPORINE (RESTASIS) 0 05 % ophthalmic emulsion, Administer 1 drop to both eyes 2 (two) times a day, Disp: , Rfl:     dicyclomine (BENTYL) 20 mg tablet, Take 1 tablet (20 mg total) by mouth every 6 (six) hours as needed (pain), Disp: 10 tablet, Rfl: 0    Elastic Bandages & Supports (THUMB SPLINT/LEFT MEDIUM) MISC, by Does not apply route daily, Disp: 1 each, Rfl: 0    famotidine (PEPCID) 20 mg tablet, Take 1 tablet (20 mg total) by mouth 2 (two) times a day, Disp: 180 tablet, Rfl: 0    glucose blood (FREESTYLE LITE) test strip, 1 each by Other route 3 (three) times a day, Disp: 100 each, Rfl: 5    Humidifiers (COOL MIST HUMIDIFIER 1 GALLON) MISC, by Does not apply route as needed (shortness of breath), Disp: 1 each, Rfl: 0    insulin aspart (NOVOLOG FLEXPEN) 100 Units/mL injection pen, Inject 6 Units under the skin 3 (three) times a day with meals, Disp: 5 pen, Rfl: 3    Insulin Glargine (TOUJEO MAX SOLOSTAR) 300 units/mL CONCETRATED U-300 injection pen, Inject 22 Units under the skin daily, Disp: 3 pen, Rfl: 0    Insulin Pen Needle (PEN NEEDLES) 31G X 8 MM MISC, Inject 1 Stick under the skin 4 (four) times a day (with meals and at bedtime), Disp: 100 each, Rfl: 6    Lancets (FREESTYLE) lancets, by Other route 3 (three) times a day, Disp: 100 each, Rfl: 5    lidocaine (XYLOCAINE) 5 % ointment, Apply topically as needed for mild pain, Disp: 240 g, Rfl: 2    MULTIPLE VITAMIN PO, Take 1 capsule by mouth daily, Disp: , Rfl:     ondansetron (ZOFRAN-ODT) 4 mg disintegrating tablet, Take 1 tablet (4 mg total) by mouth every 8 (eight) hours as needed for nausea or vomiting, Disp: 30 tablet, Rfl: 0    oxyCODONE-acetaminophen (PERCOCET) 7 5-325 MG per tablet, Take 1 tablet by mouth every 6 (six) hours as needed (pain)Max Daily Amount: 4 tablets, Disp: 120 tablet, Rfl: 0    Probiotic Product (PROBIOTIC-10 PO), Take 1 tablet by mouth daily, Disp: , Rfl:     QUEtiapine (SEROquel) 100 mg tablet, Take 1 tablet (100 mg total) by mouth daily at bedtime, Disp: 90 tablet, Rfl: 1    Salicylic Acid 26 % SOLN, Apply twice daily to the wart , Disp: 1 Bottle, Rfl: 5    TiZANidine (ZANAFLEX) 4 MG capsule, Take 1 capsule (4 mg total) by mouth 3 (three) times a day, Disp: 90 capsule, Rfl: 2    fluticasone (FLONASE) 50 mcg/act nasal spray, 1 spray into each nostril every 12 (twelve) hours, Disp: 1 Bottle, Rfl: 1    gabapentin (NEURONTIN) 400 mg capsule, Take 1 capsule (400 mg total) by mouth 3 (three) times a day for 30 days, Disp: 90 capsule, Rfl: 3     Physical Exam    Physical Exam   Constitutional: Oriented to person, place, and time  Well-developed and well-nourished, no apparent distress, non-toxic appearance  Cooperative, able to hear and answer questions without difficulty  Voice: Normal voice quality  Head: Normocephalic, atraumatic  No scars, masses or lesions  Face: Symmetric, no edema, no sinus tenderness  Eyes: Vision grossly intact, extra-ocular movement intact  Right Ear: External ear normal   Auditory canal clear  Tympanic membrane well-appearing, without retraction or scarring  No fluid present  No post-auricular erythema or tenderness  Left Ear: External ear normal   Auditory canal clear  Tympanic membrane well-appearing, without retraction or scarring  No fluid present  No post-auricular erythema or tenderness  Nose: Septum midline, nares clear  Mucosa moist, very healthy in appearance, no ulceration, no crusting, no signs of active vasculitis, turbinates well appearing  No crusting, polyps or discharge evident  Oral cavity: Dentition intact  Mucosa moist, lips normal   Tongue mobile, floor of mouth normal   Hard palate unremarkable  No masses or lesions  Imaging studies:  I personally reviewed images of recent MRI as well as a CT scan that was done approximately 2 years ago  Sinuses are completely clean, there is no septal deviation  The area of the olfactory bulb appears intact  Radiologist reports on the latest MRI: nonspecific  white  matter  foci  in  the  frontal  white  matter  bilaterally     No  new  lesions  are  seen       Early  chronic  small  vessel  ischemic  changes  should  be  considered     Correlate  for  cardiovascular  risk  factors     Collagen  vascular  disease,  sequela  of  competent  migraine  headache,  sarcoidosis,  or  atypical  demyelinating  disease  should  be  considered  in  the  right  clinical  setting  Assessment and plan:    1   Parosmia fluticasone (FLONASE) 50 mcg/act nasal spray   2  Wegener's granulomatosis with renal involvement (HCC)  fluticasone (FLONASE) 50 mcg/act nasal spray   3  CKD (chronic kidney disease) stage 4, GFR 15-29 ml/min (HCC)           Was minute that is extremely symptomatic  IA do not see any signs of activity of Wegener's on her nose  Nonetheless I recommend starting again Flonase  She did use that medication until last year  I discussed with her the fact that it is for topical anti-inflammatory effect in case there is any at activity of Wegener's causing irritation on the of olfactory mucosa, not for allergy  Regarding the lesions on the white matter, there could be some role for these as contributory factor on her symptoms  Patient wants a 2nd opinion neurologist     I encouraged patient to proceed with this 2nd opinion    Follow up with ENT in 3 months

## 2019-07-12 ENCOUNTER — TELEPHONE (OUTPATIENT)
Dept: FAMILY MEDICINE CLINIC | Facility: CLINIC | Age: 49
End: 2019-07-12

## 2019-07-12 NOTE — TELEPHONE ENCOUNTER
Patient called stating she went to her ENT appt, and the ENT told her they do not feel her problem has to do with her nose they stated pt might have to see a neuro  Patient also stated ENT told her it is ok for her to use a nasal spray but patient would like another nasal spray that is not Flonase she needs something that does smell

## 2019-07-15 DIAGNOSIS — R43.1 PAROSMIA: Primary | ICD-10-CM

## 2019-07-15 RX ORDER — TRIAMCINOLONE ACETONIDE 55 UG/1
1 SPRAY, METERED NASAL DAILY
Qty: 1 BOTTLE | Refills: 5 | Status: SHIPPED | OUTPATIENT
Start: 2019-07-15 | End: 2019-07-29 | Stop reason: SDUPTHER

## 2019-07-15 NOTE — TELEPHONE ENCOUNTER
I told pt that ENT should prescribe her since they recommended it, however, she insists that because you've prescribed her flonase before you should be able to prescribe another spray for her  She says Nasacort has no smell and she would be able to use it

## 2019-07-15 NOTE — TELEPHONE ENCOUNTER
Put in an order for [facility] neurology  As for nasal spray, would recommend contacting ENT for a different spray as they are the ones recommending it

## 2019-07-18 ENCOUNTER — TELEPHONE (OUTPATIENT)
Dept: FAMILY MEDICINE CLINIC | Facility: CLINIC | Age: 49
End: 2019-07-18

## 2019-07-18 NOTE — TELEPHONE ENCOUNTER
Called pt states she is not returning to ENT who told her her problem is not a nasal one but in her brain  She is adamant she wants to speak with you directly  Please contact pt  Thank you

## 2019-07-18 NOTE — TELEPHONE ENCOUNTER
Pt requesting medication refill 5 days ahead of time    amphetamine-dextroamphetamine (ADDERALL XR) 20 MG 24 hr capsule

## 2019-07-19 NOTE — TELEPHONE ENCOUNTER
Patient called into the front office checking in on the status of non scented nasal spray    Please contact patient

## 2019-07-22 ENCOUNTER — TELEPHONE (OUTPATIENT)
Dept: FAMILY MEDICINE CLINIC | Facility: CLINIC | Age: 49
End: 2019-07-22

## 2019-07-22 DIAGNOSIS — F31.9 BIPOLAR 1 DISORDER (HCC): ICD-10-CM

## 2019-07-22 RX ORDER — DEXTROAMPHETAMINE SACCHARATE, AMPHETAMINE ASPARTATE MONOHYDRATE, DEXTROAMPHETAMINE SULFATE AND AMPHETAMINE SULFATE 5; 5; 5; 5 MG/1; MG/1; MG/1; MG/1
20 CAPSULE, EXTENDED RELEASE ORAL 2 TIMES DAILY
Qty: 60 CAPSULE | Refills: 0 | Status: SHIPPED | OUTPATIENT
Start: 2019-07-22 | End: 2019-08-21 | Stop reason: SDUPTHER

## 2019-07-22 NOTE — TELEPHONE ENCOUNTER
Pt called at 3:30pm stating that she called [facility] and they stated that a "Drug Acception Form is needed for the spray she needs

## 2019-07-22 NOTE — TELEPHONE ENCOUNTER
Again since it was a recommendation made by the ENT that she use the nose spray to see if it will help with her symptoms, the prior auth should be done by them  In his note his is also recommending a 3 month follow up to see how symptoms are doing

## 2019-07-22 NOTE — TELEPHONE ENCOUNTER
Called and spoke with Mrs Denis Lord re: nasal spray  Her Rx is being denied by her insurance  Patient acknowledged

## 2019-07-22 NOTE — TELEPHONE ENCOUNTER
Pt called today once again stating that she needs (Flonase)  She stated that ENT never recommended that and it is her that is requesting it  She was highly upset and asked to speak to a manger  I did inform Cherelle Rodriguez about it  Pt is waiting for a call back from Cherelle Rodriguez and Dr Gonsalo Trejo

## 2019-07-23 ENCOUNTER — TELEPHONE (OUTPATIENT)
Dept: FAMILY MEDICINE CLINIC | Facility: CLINIC | Age: 49
End: 2019-07-23

## 2019-07-23 NOTE — TELEPHONE ENCOUNTER
Spoke w/ [facility] services and stated they need to get some personal information from pt  Mailed orders w/ office info and to call to s/x appt

## 2019-07-24 NOTE — TELEPHONE ENCOUNTER
Per Betty Suarez, the , he personally discussed with patient that if she needs a prior authorization for any other nasal spray that she needs to contact of the specialist

## 2019-07-25 DIAGNOSIS — M31.31 WEGENER'S GRANULOMATOSIS WITH RENAL INVOLVEMENT (HCC): Primary | ICD-10-CM

## 2019-07-25 DIAGNOSIS — R43.1 PAROSMIA: ICD-10-CM

## 2019-07-25 RX ORDER — FLUTICASONE PROPIONATE 50 MCG
2 SPRAY, SUSPENSION (ML) NASAL
Qty: 2 BOTTLE | Refills: 1 | Status: SHIPPED | OUTPATIENT
Start: 2019-07-25 | End: 2019-08-07 | Stop reason: ALTCHOICE

## 2019-07-25 NOTE — TELEPHONE ENCOUNTER
Pt called today stating that she is just going to wait for her appt with us to talk about her nasal spray situation  Pt stated that she no longer is going to call us about this and will rather speak face to face to give more understanding

## 2019-07-29 ENCOUNTER — TELEPHONE (OUTPATIENT)
Dept: OTOLARYNGOLOGY | Facility: CLINIC | Age: 49
End: 2019-07-29

## 2019-07-29 NOTE — TELEPHONE ENCOUNTER
ENT received a message on the answering machine over the weekend  This patient was screaming and cursing on the message  Stated that ENT did not prescribe the correct nasal spray for her and we were "abusing her "  There are several issues here  1  She called this office on 7/25/19 to state that she needed a "nasal spray", not giving any indication that she didn't want Flonase because of the smell  That information she had shared with her PCP and not us  2  She had contacted her PCP earlier int he week and the PCP prescribed Nasacort, which is odorless, but her [place] [employer] would not cover that and PCP didn't feel they should authorize it, but rather ENT should     3  ENT cannot authorize a script we didn't write, so now that we have all of the information, ENT will try and get the Nasacort authorized with a new prescription that is written by Dr Mario Vela  I contacted the patient and I explained that the medication she wants is OTC and offered that we have coupons to cut the cost   She is not interested in this  Cannot afford it  I also told her we have to get a new script from Dr Mario Vela and a call was placed to his private office to get that done  We only have physicians here on Thursdays, but I am doing my best to get an plan for her  I called [facility] and the pharmacist there stated that we need to send all documentation about her failed therapy results with Flonase use in order to consider auth  I explained to the patient that this will take several days to get their decision and it's possible the answer may not be favorable  She expressed that she understands  Will proceed with trying to get the authorization

## 2019-07-31 ENCOUNTER — TELEPHONE (OUTPATIENT)
Dept: OTOLARYNGOLOGY | Facility: CLINIC | Age: 49
End: 2019-07-31

## 2019-07-31 DIAGNOSIS — M31.30 WEGENER'S GRANULOMATOSIS: Chronic | ICD-10-CM

## 2019-07-31 DIAGNOSIS — G89.4 CHRONIC PAIN SYNDROME: ICD-10-CM

## 2019-07-31 NOTE — TELEPHONE ENCOUNTER
[place] denied the authorization and the appeal for the patient to get Nasacort because OTC  Called compounding Pharmacy at [phone] to see if they could compound somehing for her that has no floral scent and they can, but it would be about $60 00, much more than the OTC Nasacort  I called the patient and explained this to her and offered her the $8 00 coupon for the Flonase Sensimist and also suggested [facility] for her to see if she can get a better price there  She was unhappy and will probably not look into either suggestion, but there is nothing else that can be done from our end

## 2019-08-01 RX ORDER — OXYCODONE AND ACETAMINOPHEN 7.5; 325 MG/1; MG/1
1 TABLET ORAL EVERY 6 HOURS PRN
Qty: 120 TABLET | Refills: 0 | Status: SHIPPED | OUTPATIENT
Start: 2019-08-01 | End: 2019-09-03 | Stop reason: SDUPTHER

## 2019-08-05 DIAGNOSIS — IMO0001 IDDM (INSULIN DEPENDENT DIABETES MELLITUS): Chronic | ICD-10-CM

## 2019-08-05 DIAGNOSIS — G89.4 CHRONIC PAIN SYNDROME: ICD-10-CM

## 2019-08-06 ENCOUNTER — OFFICE VISIT (OUTPATIENT)
Dept: FAMILY MEDICINE CLINIC | Facility: CLINIC | Age: 49
End: 2019-08-06

## 2019-08-06 VITALS
BODY MASS INDEX: 36.03 KG/M2 | DIASTOLIC BLOOD PRESSURE: 80 MMHG | RESPIRATION RATE: 16 BRPM | HEART RATE: 88 BPM | TEMPERATURE: 97 F | SYSTOLIC BLOOD PRESSURE: 126 MMHG | OXYGEN SATURATION: 94 % | WEIGHT: 197 LBS

## 2019-08-06 DIAGNOSIS — Z13.220 SCREENING CHOLESTEROL LEVEL: ICD-10-CM

## 2019-08-06 DIAGNOSIS — Z00.00 MEDICARE ANNUAL WELLNESS VISIT, SUBSEQUENT: Primary | ICD-10-CM

## 2019-08-06 DIAGNOSIS — Z11.4 SCREENING FOR HIV WITHOUT PRESENCE OF RISK FACTORS: ICD-10-CM

## 2019-08-06 PROCEDURE — G0439 PPPS, SUBSEQ VISIT: HCPCS | Performed by: PHYSICIAN ASSISTANT

## 2019-08-06 RX ORDER — LAMOTRIGINE 25 MG/1
TABLET ORAL
COMMUNITY
End: 2019-12-26

## 2019-08-06 RX ORDER — GABAPENTIN 400 MG/1
CAPSULE ORAL
COMMUNITY
Start: 2019-07-23 | End: 2020-04-22

## 2019-08-06 RX ORDER — BUPROPION HYDROCHLORIDE 150 MG/1
TABLET ORAL
Status: ON HOLD | COMMUNITY
End: 2021-04-26 | Stop reason: DRUGHIGH

## 2019-08-06 RX ORDER — LIDOCAINE 50 MG/G
OINTMENT TOPICAL
Qty: 250 G | Refills: 1 | Status: SHIPPED | OUTPATIENT
Start: 2019-08-06 | End: 2019-12-10 | Stop reason: SDUPTHER

## 2019-08-06 RX ORDER — INSULIN GLARGINE 300 U/ML
INJECTION, SOLUTION SUBCUTANEOUS
Qty: 6 PEN | Refills: 0 | Status: SHIPPED | OUTPATIENT
Start: 2019-08-06 | End: 2020-07-30 | Stop reason: SDUPTHER

## 2019-08-06 NOTE — PROGRESS NOTES
Assessment and Plan:     Problem List Items Addressed This Visit     None      Visit Diagnoses     Medicare annual wellness visit, subsequent    -  Primary    Screening cholesterol level        Relevant Orders    Lipid Panel with Direct LDL reflex    Screening for HIV without presence of risk factors        Relevant Orders    HIV 1/2 AG-AB combo         History of Present Illness:     Patient presents for Welcome to Medicare visit  Patient Care Team:  Monserrat Ramírez PA-C as PCP - General (Family Medicine)  Monserrat Ramírez PA-C as Physician Assistant  DO Patrick Morgan MD Melvyn Keeler, CRNP Hodges Ran, MD as Endoscopist     Review of Systems:     Review of Systems   Constitutional: Negative for activity change, appetite change, chills, diaphoresis, fatigue, fever and unexpected weight change  HENT: Negative for congestion, ear pain, hearing loss, rhinorrhea and sore throat  Eyes: Negative for pain and visual disturbance  Respiratory: Negative for cough, shortness of breath and wheezing  Cardiovascular: Negative for chest pain, palpitations and leg swelling  Gastrointestinal: Positive for abdominal pain, nausea and vomiting  Negative for blood in stool, bowel incontinence, constipation and diarrhea  Endocrine: Negative for cold intolerance, heat intolerance and polyuria  Genitourinary: Negative for difficulty urinating, dysuria, frequency, hematuria and urgency  Musculoskeletal: Negative for arthralgias, joint swelling and myalgias  Skin: Negative for rash and wound  Neurological: Positive for weakness and numbness  Negative for dizziness, syncope, light-headedness and headaches  Psychiatric/Behavioral: Negative for dysphoric mood and sleep disturbance  The patient is nervous/anxious           Problem List:     Patient Active Problem List   Diagnosis    Type 2 diabetes mellitus ([facility]    Dyslipidemia    [facility]gener's granulomatosis ([facility] )    CKD (chronic kidney disease) stage 4, GFR 15-29 ml/min (HCC)    MPA (microscopic polyangiitis) (HCC)    Asthma    HTN (hypertension)    Acute pancreatitis    Chronic pain    Noncompliance    Bipolar 1 disorder (HCC)    Epigastric pain    Pancreatitis    Ovarian cyst    Postherpetic neuralgia    Anemia of chronic disease    Arthralgia of multiple joints    Cataplexy    Weakness of both lower extremities    Diarrhea of presumed infectious origin    Chronic pelvic pain in female    Plantar wart, right foot    Seasonal allergic rhinitis due to pollen    Gastroesophageal reflux disease    Weakness of both upper extremities    Persistent proteinuria    Left leg pain    Controlled substance agreement signed    Chronic narcotic use    Small fiber neuropathy    IBS (irritable bowel syndrome)    Leukocytosis    Gastroparesis    Hyperosmia    Smell disturbance      Past Medical and Surgical History:     Past Medical History:   Diagnosis Date    ADHD     Anemia of chronic disease     Anxiety     Asthma     Asthma     Borderline personality disorder (HCC)     Cataplexy     Chronic abdominal pain     CKD (chronic kidney disease) stage 3, GFR 30-59 ml/min (HCC)     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HCC)     Cushing syndrome ([facility]     Diabetes mellitus ([facility]     DVT (deep venous thrombosis) ([facility] )     History of acute pancreatitis     felt secondary to Bactrim    HTN (hypertension)     Hypertension     Liver disease     fatty liver    Microscopic polyangiitis (HCC)     Morbid obesity (HCC)     MPA (microscopic polyangiitis) (HCC)     Ovarian cyst     PTSD (post-traumatic stress disorder)     Renal disorder     Self-inflicted injury     self inflicted skin wounds    Wegener's granulomatosis with renal involvement ([facility] ) 2015     Past Surgical History:   Procedure Laterality Date    ESOPHAGOGASTRODUODENOSCOPY  09/11/2015    mild antral gastritis    ME COLONOSCOPY FLX DX W/COLLJ SPEC WHEN PFRMD N/A 12/14/2018 Procedure: COLONOSCOPY with polypectomy;  Surgeon: Josefina Curtis MD;  Location: [facility]; Service: Gastroenterology    [place] ESOPHAGOGASTRODUODENOSCOPY TRANSORAL DIAGNOSTIC N/A 2018    Procedure: ESOPHAGOGASTRODUODENOSCOPY (EGD) with biopsy;  Surgeon: Josefina Curtis MD;  Location: [facility];   Service: Gastroenterology    RELEASE SCAR CONTRACTURE / GRAFT REPAIRS OF HAND        Family History:     Family History   Problem Relation Age of Onset    No Known Problems Mother     No Known Problems Father     Colon cancer Neg Hx     Drug abuse Neg Hx         mother father    Mental illness Neg Hx         disorder, mother father      Social History:     Social History     Tobacco Use   Smoking Status Former Smoker    Types: Cigarettes    Last attempt to quit:     Years since quittin 6   Smokeless Tobacco Former User     Social History     Substance and Sexual Activity   Alcohol Use No     Social History     Substance and Sexual Activity   Drug Use Yes    Types: Marijuana      Medications and Allergies:     Current Outpatient Medications   Medication Sig Dispense Refill    albuterol (VENTOLIN HFA) 90 mcg/act inhaler Inhale 2 puffs every 6 (six) hours as needed for wheezing 18 g 1    Alcohol Swabs (ALCOHOL PADS) 70 % PADS by Does not apply route 4 (four) times a day 400 each 3    amLODIPine (NORVASC) 5 mg tablet Take 1 tablet (5 mg total) by mouth daily 90 tablet 3    amphetamine-dextroamphetamine (ADDERALL XR) 20 MG 24 hr capsule Take 1 capsule (20 mg total) by mouth 2 (two) times a dayMax Daily Amount: 40 mg 60 capsule 0    Blood Glucose Monitoring Suppl (FREESTYLE LITE) DEIRDRE by Does not apply route daily 1 each 0    buPROPion (WELLBUTRIN XL) 150 mg 24 hr tablet bupropion HCl  mg 24 hr tablet, extended release      cycloSPORINE (RESTASIS) 0 05 % ophthalmic emulsion Administer 1 drop to both eyes 2 (two) times a day      dicyclomine (BENTYL) 20 mg tablet Take 1 tablet (20 mg total) by mouth every 6 (six) hours as needed (pain) 10 tablet 0    famotidine (PEPCID) 20 mg tablet Take 1 tablet (20 mg total) by mouth 2 (two) times a day 180 tablet 0    gabapentin (NEURONTIN) 400 mg capsule       glucose blood (FREESTYLE LITE) test strip 1 each by Other route 3 (three) times a day 100 each 5    Humidifiers (COOL MIST HUMIDIFIER 1 GALLON) MISC by Does not apply route as needed (shortness of breath) 1 each 0    insulin aspart (NOVOLOG FLEXPEN) 100 Units/mL injection pen Inject 6 Units under the skin 3 (three) times a day with meals 5 pen 3    Insulin Pen Needle (PEN NEEDLES) 31G X 8 MM MISC Inject 1 Stick under the skin 4 (four) times a day (with meals and at bedtime) 100 each 6    lamoTRIgine (LaMICtal) 25 mg tablet lamotrigine 25 mg tablet      Lancets (FREESTYLE) lancets by Other route 3 (three) times a day 100 each 5    lidocaine (XYLOCAINE) 5 % ointment APPLY TWO GRAMS TWICE A DAY TOPICALLY AS NEEDED FOR MILD PAIN 250 g 1    MULTIPLE VITAMIN PO Take 1 capsule by mouth daily      ondansetron (ZOFRAN-ODT) 4 mg disintegrating tablet Take 1 tablet (4 mg total) by mouth every 8 (eight) hours as needed for nausea or vomiting 30 tablet 0    oxyCODONE-acetaminophen (PERCOCET) 7 5-325 MG per tablet Take 1 tablet by mouth every 6 (six) hours as needed (pain)Max Daily Amount: 4 tablets 120 tablet 0    Probiotic Product (PROBIOTIC-10 PO) Take 1 tablet by mouth daily      QUEtiapine (SEROquel) 100 mg tablet Take 1 tablet (100 mg total) by mouth daily at bedtime 90 tablet 1    TiZANidine (ZANAFLEX) 4 MG capsule Take 1 capsule (4 mg total) by mouth 3 (three) times a day 90 capsule 2    TOUJEO MAX SOLOSTAR 300 units/mL CONCETRATED U-300 injection pen INJECT 22 UNITS UNDER THE SKIN DAILY 6 pen 0    Elastic Bandages & Supports (THUMB SPLINT/LEFT MEDIUM) MISC by Does not apply route daily (Patient not taking: Reported on 8/6/2019) 1 each 0    fluticasone (FLONASE) 50 mcg/act nasal spray 2 sprays into each nostril 2 (two) times a day (Patient not taking: Reported on 8/6/2019) 2 Bottle 1    gabapentin (NEURONTIN) 400 mg capsule Take 1 capsule (400 mg total) by mouth 3 (three) times a day for 30 days 90 capsule 3    Salicylic Acid 26 % SOLN Apply twice daily to the wart  (Patient not taking: Reported on 8/6/2019) 1 Bottle 5    Triamcinolone Acetonide 55 MCG/ACT AERO 1 Act (55 mcg total) into each nostril daily (Patient not taking: Reported on 8/6/2019) 1 Bottle 5     No current facility-administered medications for this visit  Allergies   Allergen Reactions    Prozac [Fluoxetine Hcl]      SI    Bactrim [Sulfamethoxazole-Trimethoprim]      Pt "They think that is what cause the pancreatitis"     Lithium Other (See Comments)    Amlodipine Hives    Haldol [Haloperidol] Other (See Comments)     "I don't like it"    Ibuprofen     Lexapro [Escitalopram Oxalate] Rash    Navane [Thiothixene]      SI    Other      "novaine?" antipsychotic      Immunizations:     Immunization History   Administered Date(s) Administered    INFLUENZA 10/13/2008, 12/23/2010, 10/27/2011    Pneumococcal Conjugate 13-Valent 05/12/2015    Pneumococcal Polysaccharide PPV23 10/13/2008, 09/17/2015    Rabies 09/27/2007      Medicare Screening Tests and Risk Assessments:     Sandee Mercado is here for her Initial Wellness visit  Health Risk Assessment:  Patient rates overall health as good  Patient feels that their physical health rating is Much better  Eyesight was rated as Much worse  Hearing was rated as Same  Patient feels that their emotional and mental health rating is Slightly better  Pain experienced by patient in the last 7 days has been Some  Patient's pain rating has been 8/10  Patient states that she has experienced no weight loss or gain in last 6 months  (Additional comments: Apps appointment with eye doctor in November )    Emotional/Mental Health:  Patient has been feeling nervous/anxious      PHQ-9 Depression Screening:    Frequency of the following problems over the past two weeks:      1  Little interest or pleasure in doing things: 0 - not at all      2  Feeling down, depressed, or hopeless: 0 - not at all  PHQ-2 Score: 0          Broken Bones/Falls: Fall Risk Assessment:    In the past year, patient has experienced: History of falling in past year    Number of falls: 2 or more    Injured during fall: No      Patient feels steady when standing or walking  Patient is worried about falling     Bladder/Bowel:  Patient has not leaked urine accidently in the last six months  Patient reports no loss of bowel control  Immunizations:  Patient has had a flu vaccination within the last year  Patient has received a pneumonia shot  Patient has not received a shingles shot  Patient has not received tetanus/diphtheria shot  Home Safety:  Patient has trouble with stairs inside or outside of their home  Patient currently reports that there are safety hazards present in home , working smoke alarms, working carbon monoxide detectors  Preventative Screenings:   No breast cancer screening performed, colon cancer screen completed, 12/14/2018  cholesterol screen completed, 6/1/2016  no glaucoma eye exam completed    Nutrition:  Current diet: No Added Salt and Limited junk food with servings of the following:  (Additional Comments: Has gastroparesis, and recurrent pancreatitis  Patient does admit that she has been improving her diet )    Medications:  Patient is not currently taking any over-the-counter supplements  Patient is able to manage medications  Lifestyle Choices:  Patient reports no tobacco use  Patient has smoked or used tobacco in the past   Patient has stopped her tobacco use  Patient reports no alcohol use  Patient does not drive a vehicle  Patient wears seat belt  Current level of exercise of physical activity described by patient as: below average     (Additional Comments: Difficulty with ambulation due to cataplexy and weakness in lower extremities  Does follow up with Neurology )    Activities of Daily Living:  Can get out of bed by his or her self, able to dress self, unable to make own meals, able to do own shopping, able to bathe self, unable to do laundry/housekeeping, can manage own money, pay bills and track expenses    Previous Hospitalizations:  Hospitalization or ED visit in past 12 months  Number of hospitalizations within the last year: 1-2        Advanced Directives:  Patient has decided on a power of   Patient has spoken to designated power of   Patient has completed advanced directive  Preventative Screening/Counseling:      Cardiovascular:      General: Risks and Benefits Discussed      Counseling: Healthy Diet, Healthy Weight and Improve Cholesterol     Due for Labs/Analytes/Optional EKG: Lipid Panel      Comments: Last cholesterol panel was approximately 3 years ago, and had elevated triglycerides  Ordered lab work today  Diabetes:      General: Risks and Benefits Discussed and Screening Current      Counseling: Healthy Diet and Healthy Weight      Comments: Recent A1c was 6 2%  Colorectal Cancer:      General: Risks and Benefits Discussed and Screening Current      Comments: Had colonoscopy completed on 12/14/2018  Recommended to get a repeat colonoscopy in 5 years  Breast Cancer:      General: Risks and Benefits Discussed      Comments: Has not gotten mammogram done recently  Orders in the system  Cervical Cancer:      General: Risks and Benefits Discussed      Comments: Has appointment with OBGYN tomorrow          Osteoporosis:      General: Screening Not Indicated          AAA:      General: Screening Not Indicated          Glaucoma:      General: Risks and Benefits Discussed and Screening Current          HIV:      General: Risks and Benefits Discussed      Due for labs: SAVANNAH          Hepatitis C:      General: Risks and Benefits Discussed and Screening Current      Additional Comments: Screening completed 2015 at [facility]  Was negative  Advanced Directives: Information on ACP and/or AD provided  Immunizations:  Patient reviewed and up to date      Other Preventative Counseling (Non-Medicare):  Alcohol Use, Fall Prevention, Nutrition Counseling, Car/seat belt/driving safety reviewed, Skin self-exam, Sunscreen use and Weight reduction discussed        No exam data present     Physical Exam:     /80 (BP Location: Right arm, Patient Position: Sitting, Cuff Size: Standard)   Pulse 88   Temp (!) 97 °F (36 1 °C) (Temporal)   Resp 16   Wt 89 4 kg (197 lb) Comment: Pt reported weight Pt in wheelchair  LMP  (LMP Unknown)   SpO2 94%   BMI 36 03 kg/m²     Physical Exam   Constitutional: She is oriented to person, place, and time  She appears well-developed and well-nourished  No distress  HENT:   Right Ear: Hearing, tympanic membrane, external ear and ear canal normal    Left Ear: Hearing, tympanic membrane, external ear and ear canal normal    Nose: Nose normal    Mouth/Throat: Oropharynx is clear and moist and mucous membranes are normal    Eyes: Pupils are equal, round, and reactive to light  Conjunctivae, EOM and lids are normal    Neck: Normal range of motion  Neck supple  No JVD present  No thyromegaly present  Cardiovascular: Normal rate, regular rhythm, normal heart sounds and normal pulses  Exam reveals no gallop and no friction rub  No murmur heard  Pulmonary/Chest: Effort normal and breath sounds normal  No respiratory distress  She has no wheezes  She has no rales  Abdominal: Soft  Normal appearance and bowel sounds are normal  She exhibits no distension  There is tenderness in the epigastric area  There is no rigidity, no rebound and no guarding  Musculoskeletal: Normal range of motion  She exhibits no edema  Lymphadenopathy:     She has no cervical adenopathy     Neurological: She is alert and oriented to person, place, and time  She has normal reflexes  A sensory deficit is present  No cranial nerve deficit  Decreased strength in bilateral lower extremities  Skin: Skin is warm and dry  No rash noted  She is not diaphoretic  Psychiatric: She has a normal mood and affect   Her behavior is normal

## 2019-08-07 ENCOUNTER — OFFICE VISIT (OUTPATIENT)
Dept: OBGYN CLINIC | Facility: CLINIC | Age: 49
End: 2019-08-07

## 2019-08-07 VITALS
SYSTOLIC BLOOD PRESSURE: 130 MMHG | WEIGHT: 197 LBS | HEIGHT: 62 IN | BODY MASS INDEX: 36.25 KG/M2 | DIASTOLIC BLOOD PRESSURE: 80 MMHG

## 2019-08-07 DIAGNOSIS — B37.3 VAGINA, CANDIDIASIS: ICD-10-CM

## 2019-08-07 DIAGNOSIS — R10.2 VAGINAL PAIN: Primary | ICD-10-CM

## 2019-08-07 LAB — SL AMB POCT WET MOUNT: ABNORMAL

## 2019-08-07 PROCEDURE — 87210 SMEAR WET MOUNT SALINE/INK: CPT | Performed by: NURSE PRACTITIONER

## 2019-08-07 PROCEDURE — 99203 OFFICE O/P NEW LOW 30 MIN: CPT | Performed by: NURSE PRACTITIONER

## 2019-08-07 RX ORDER — FLUCONAZOLE 150 MG/1
150 TABLET ORAL ONCE
Qty: 1 TABLET | Refills: 0 | Status: SHIPPED | OUTPATIENT
Start: 2019-08-07 | End: 2019-08-07

## 2019-08-07 NOTE — PROGRESS NOTES
PROBLEM GYNECOLOGICAL VISIT    Yu Bhatia is a 50 y o  female who presents today with complaint of vaginal pain  Her general medical history has been reviewed and she reports it as follows:    Past Medical History:   Diagnosis Date    ADHD     Anemia of chronic disease     Anxiety     Asthma     Asthma     Borderline personality disorder (HCC)     Cataplexy     Chronic abdominal pain     CKD (chronic kidney disease) stage 3, GFR 30-59 ml/min ([facility])     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[facility] [address] )     [facility] syndrome [facility]     Diabetes mellitus ([facility]     DVT (deep venous thrombosis) ([facility] )     History of acute pancreatitis     felt secondary to Bactrim    HTN (hypertension)     Hypertension     Liver disease     fatty liver    Microscopic polyangiitis ([facility]     Morbid obesity ([facility]     MPA (microscopic polyangiitis) ([facility] [address] )     Ovarian cyst     PTSD (post-traumatic stress disorder)     Renal disorder     Self-inflicted injury     self inflicted skin wounds    [facility]      Past Surgical History:   Procedure Laterality Date    ESOPHAGOGASTRODUODENOSCOPY  2015    mild antral gastritis    [place] COLONOSCOPY FLX DX W/COLLJ SPEC WHEN PFRMD N/A 2018    Procedure: COLONOSCOPY with polypectomy;  Surgeon: Balbina Woods MD;  Location: [facility]; Service: Gastroenterology    [place] ESOPHAGOGASTRODUODENOSCOPY TRANSORAL DIAGNOSTIC N/A 2018    Procedure: ESOPHAGOGASTRODUODENOSCOPY (EGD) with biopsy;  Surgeon: Balbina Woods MD;  Location: [facility];   Service: Gastroenterology    RELEASE SCAR CONTRACTURE / GRAFT REPAIRS OF HAND       OB History        0    Para   0    Term   0       0    AB   0    Living   0       SAB   0    TAB   0    Ectopic   0    Multiple   0    Live Births   0               Social History     Tobacco Use    Smoking status: Former Smoker     Types: Cigarettes     Last attempt to quit:      Years since quittin 6    Smokeless tobacco: Former User   Substance Use Topics    Alcohol use: No     Frequency: Never    Drug use: Yes     Types: Marijuana     Comment: marijuana daily       Current Outpatient Medications:     albuterol (VENTOLIN HFA) 90 mcg/act inhaler, Inhale 2 puffs every 6 (six) hours as needed for wheezing, Disp: 18 g, Rfl: 1    Alcohol Swabs (ALCOHOL PADS) 70 % PADS, by Does not apply route 4 (four) times a day, Disp: 400 each, Rfl: 3    amLODIPine (NORVASC) 5 mg tablet, Take 1 tablet (5 mg total) by mouth daily, Disp: 90 tablet, Rfl: 3    amphetamine-dextroamphetamine (ADDERALL XR) 20 MG 24 hr capsule, Take 1 capsule (20 mg total) by mouth 2 (two) times a dayMax Daily Amount: 40 mg, Disp: 60 capsule, Rfl: 0    Blood Glucose Monitoring Suppl (FREESTYLE LITE) DEIRDRE, by Does not apply route daily, Disp: 1 each, Rfl: 0    buPROPion (WELLBUTRIN XL) 150 mg 24 hr tablet, bupropion HCl  mg 24 hr tablet, extended release, Disp: , Rfl:     cycloSPORINE (RESTASIS) 0 05 % ophthalmic emulsion, Administer 1 drop to both eyes 2 (two) times a day, Disp: , Rfl:     dicyclomine (BENTYL) 20 mg tablet, Take 1 tablet (20 mg total) by mouth every 6 (six) hours as needed (pain), Disp: 10 tablet, Rfl: 0    famotidine (PEPCID) 20 mg tablet, Take 1 tablet (20 mg total) by mouth 2 (two) times a day, Disp: 180 tablet, Rfl: 0    gabapentin (NEURONTIN) 400 mg capsule, Take 1 capsule (400 mg total) by mouth 3 (three) times a day for 30 days, Disp: 90 capsule, Rfl: 3    gabapentin (NEURONTIN) 400 mg capsule, , Disp: , Rfl:     glucose blood (FREESTYLE LITE) test strip, 1 each by Other route 3 (three) times a day, Disp: 100 each, Rfl: 5    Humidifiers (COOL MIST HUMIDIFIER 1 GALLON) MISC, by Does not apply route as needed (shortness of breath), Disp: 1 each, Rfl: 0    insulin aspart (NOVOLOG FLEXPEN) 100 Units/mL injection pen, Inject 6 Units under the skin 3 (three) times a day with meals, Disp: 5 pen, Rfl: 3    Insulin Pen Needle (PEN NEEDLES) 31G X 8 MM MISC, Inject 1 Stick under the skin 4 (four) times a day (with meals and at bedtime), Disp: 100 each, Rfl: 6    lamoTRIgine (LaMICtal) 25 mg tablet, lamotrigine 25 mg tablet, Disp: , Rfl:     Lancets (FREESTYLE) lancets, by Other route 3 (three) times a day, Disp: 100 each, Rfl: 5    lidocaine (XYLOCAINE) 5 % ointment, APPLY TWO GRAMS TWICE A DAY TOPICALLY AS NEEDED FOR MILD PAIN, Disp: 250 g, Rfl: 1    MULTIPLE VITAMIN PO, Take 1 capsule by mouth daily, Disp: , Rfl:     ondansetron (ZOFRAN-ODT) 4 mg disintegrating tablet, Take 1 tablet (4 mg total) by mouth every 8 (eight) hours as needed for nausea or vomiting, Disp: 30 tablet, Rfl: 0    oxyCODONE-acetaminophen (PERCOCET) 7 5-325 MG per tablet, Take 1 tablet by mouth every 6 (six) hours as needed (pain)Max Daily Amount: 4 tablets, Disp: 120 tablet, Rfl: 0    Probiotic Product (PROBIOTIC-10 PO), Take 1 tablet by mouth daily, Disp: , Rfl:     QUEtiapine (SEROquel) 100 mg tablet, Take 1 tablet (100 mg total) by mouth daily at bedtime, Disp: 90 tablet, Rfl: 1    TiZANidine (ZANAFLEX) 4 MG capsule, Take 1 capsule (4 mg total) by mouth 3 (three) times a day, Disp: 90 capsule, Rfl: 2    TOUJEO MAX SOLOSTAR 300 units/mL CONCETRATED U-300 injection pen, INJECT 22 UNITS UNDER THE SKIN DAILY, Disp: 6 pen, Rfl: 0    Triamcinolone Acetonide 55 MCG/ACT AERO, 1 Act (55 mcg total) into each nostril daily, Disp: 1 Bottle, Rfl: 5    History of Present Illness:   Reports for past 2 months has been experiencing severe vulvo/vaginal dryness which then causes vulvar pain when she wipes  She experienced early menopause in 2015 due to her kidney disease, and states that vaginal dryness has been progressive since that time, but past 2 months has been significant  Review of Systems:  Review of Systems   Constitutional: Negative  Gastrointestinal: Negative  Genitourinary: Positive for vaginal pain   Negative for difficulty urinating, dysuria, pelvic pain and vaginal discharge  Skin: Negative  Physical Exam:  /80   Ht 5' 2" (1 575 m)   Wt 89 4 kg (197 lb)   LMP  (LMP Unknown)   BMI 36 03 kg/m²   Physical Exam   Constitutional: She appears well-developed and well-nourished  Genitourinary:       There is erythema in the vagina  No tenderness in the vagina  No vaginal discharge found  Cervix does not exhibit lesion, discharge or friability  Skin: Skin is warm and dry  Vitals reviewed  Point of Care Testing:   -Wet mount: no trichomonads, no clue cells, few WBC's, no RBC's   -KOH mount: + hyphae   -Whiff: negative    Assessment:   1  Vaginal candidiasis  2  Vaginal atrophy  Plan:   1  Given Rx Diflucan  If symptoms not relieved within 2 weeks, advised her to call and I will give Rx vaginal estrogen cream    2  Return to office for annual GYN exam   Patient is not sure last time she had one or had pap smear done - but thinks less than a year ago

## 2019-08-13 ENCOUNTER — TELEPHONE (OUTPATIENT)
Dept: FAMILY MEDICINE CLINIC | Facility: CLINIC | Age: 49
End: 2019-08-13

## 2019-08-13 NOTE — TELEPHONE ENCOUNTER
I think I may have signed paper work yesterday, but I do not see anything under the media section at this time

## 2019-08-19 ENCOUNTER — TELEPHONE (OUTPATIENT)
Dept: FAMILY MEDICINE CLINIC | Facility: CLINIC | Age: 49
End: 2019-08-19

## 2019-08-20 NOTE — TELEPHONE ENCOUNTER
Found document in the fax bin in preceptor room  Faxed document back to [employer] [facility] [phone]  Received transmittal response "ok"  Spoke with pt and advised her it has been faxed  Pt had no other questions or concerns at the time

## 2019-08-21 ENCOUNTER — HOSPITAL ENCOUNTER (EMERGENCY)
Facility: HOSPITAL | Age: 49
Discharge: HOME | End: 2019-08-21
Attending: EMERGENCY MEDICINE | Admitting: EMERGENCY MEDICINE
Payer: COMMERCIAL

## 2019-08-21 ENCOUNTER — APPOINTMENT (EMERGENCY)
Dept: CT IMAGING | Facility: HOSPITAL | Age: 49
End: 2019-08-21
Payer: COMMERCIAL

## 2019-08-21 ENCOUNTER — TELEPHONE (OUTPATIENT)
Dept: OBGYN CLINIC | Facility: CLINIC | Age: 49
End: 2019-08-21

## 2019-08-21 VITALS
TEMPERATURE: 98.6 F | OXYGEN SATURATION: 97 % | HEART RATE: 74 BPM | DIASTOLIC BLOOD PRESSURE: 77 MMHG | SYSTOLIC BLOOD PRESSURE: 136 MMHG | RESPIRATION RATE: 16 BRPM

## 2019-08-21 DIAGNOSIS — F31.9 BIPOLAR 1 DISORDER (HCC): ICD-10-CM

## 2019-08-21 DIAGNOSIS — S70.02XA CONTUSION OF LEFT HIP, INITIAL ENCOUNTER: Primary | ICD-10-CM

## 2019-08-21 DIAGNOSIS — N95.2 VAGINAL ATROPHY: Primary | ICD-10-CM

## 2019-08-21 PROCEDURE — 99284 EMERGENCY DEPT VISIT MOD MDM: CPT

## 2019-08-21 PROCEDURE — 99284 EMERGENCY DEPT VISIT MOD MDM: CPT | Performed by: EMERGENCY MEDICINE

## 2019-08-21 PROCEDURE — 73700 CT LOWER EXTREMITY W/O DYE: CPT

## 2019-08-21 RX ORDER — OXYCODONE HYDROCHLORIDE AND ACETAMINOPHEN 5; 325 MG/1; MG/1
2 TABLET ORAL ONCE
Status: COMPLETED | OUTPATIENT
Start: 2019-08-21 | End: 2019-08-21

## 2019-08-21 RX ORDER — DEXTROAMPHETAMINE SACCHARATE, AMPHETAMINE ASPARTATE MONOHYDRATE, DEXTROAMPHETAMINE SULFATE AND AMPHETAMINE SULFATE 5; 5; 5; 5 MG/1; MG/1; MG/1; MG/1
20 CAPSULE, EXTENDED RELEASE ORAL 2 TIMES DAILY
Qty: 60 CAPSULE | Refills: 0 | Status: SHIPPED | OUTPATIENT
Start: 2019-08-21 | End: 2019-09-23 | Stop reason: SDUPTHER

## 2019-08-21 RX ORDER — FLUCONAZOLE 150 MG/1
TABLET ORAL
COMMUNITY
End: 2019-12-26 | Stop reason: ALTCHOICE

## 2019-08-21 RX ADMIN — OXYCODONE HYDROCHLORIDE AND ACETAMINOPHEN 2 TABLET: 5; 325 TABLET ORAL at 17:36

## 2019-08-21 NOTE — TELEPHONE ENCOUNTER
Received call from patient  She reports vaginal dryness is not resolved since taking Diflucan  She desires to try vaginal estrogen as we discussed previously  Rx sent to her pharmacy

## 2019-08-21 NOTE — ED PROVIDER NOTES
History  Chief Complaint   Patient presents with    Hip Pain     Patient reporting L hip pain for a few weeks  Patient does not recall a specific injury  Reports she experiences frequent falls  C/o L hip pain for 2-3 weeks, no definite injury  Pt  Is limping at times  No n/v, no fevers  No redness or open wounds of the L hip          Prior to Admission Medications   Prescriptions Last Dose Informant Patient Reported? Taking?    Alcohol Swabs (ALCOHOL PADS) 70 % PADS  Self No Yes   Sig: by Does not apply route 4 (four) times a day   Blood Glucose Monitoring Suppl (FREESTYLE LITE) DEIRDRE  Self No Yes   Sig: by Does not apply route daily   Humidifiers (COOL MIST HUMIDIFIER 1 GALLON) MISC  Self No Yes   Sig: by Does not apply route as needed (shortness of breath)   Insulin Pen Needle (PEN NEEDLES) 31G X 8 MM MISC  Self No Yes   Sig: Inject 1 Stick under the skin 4 (four) times a day (with meals and at bedtime)   Lancets (FREESTYLE) lancets  Self No Yes   Sig: by Other route 3 (three) times a day   MULTIPLE VITAMIN PO  Self Yes Yes   Sig: Take 1 capsule by mouth daily   Probiotic Product (PROBIOTIC-10 PO)  Self Yes Yes   Sig: Take 1 tablet by mouth daily   QUEtiapine (SEROquel) 100 mg tablet  Self No Yes   Sig: Take 1 tablet (100 mg total) by mouth daily at bedtime   TOUJEO MAX SOLOSTAR 300 units/mL CONCETRATED U-300 injection pen   No Yes   Sig: INJECT 22 UNITS UNDER THE SKIN DAILY   TiZANidine (ZANAFLEX) 4 MG capsule  Self No Yes   Sig: Take 1 capsule (4 mg total) by mouth 3 (three) times a day   Triamcinolone Acetonide 55 MCG/ACT AERO   No Yes   Si Act (55 mcg total) into each nostril daily   albuterol (VENTOLIN HFA) 90 mcg/act inhaler  Self No Yes   Sig: Inhale 2 puffs every 6 (six) hours as needed for wheezing   amLODIPine (NORVASC) 5 mg tablet  Self No Yes   Sig: Take 1 tablet (5 mg total) by mouth daily   amphetamine-dextroamphetamine (ADDERALL XR) 20 MG 24 hr capsule   No Yes   Sig: Take 1 capsule (20 mg total) by mouth 2 (two) times a dayMax Daily Amount: 40 mg   buPROPion (WELLBUTRIN XL) 150 mg 24 hr tablet   Yes Yes   Sig: bupropion HCl  mg 24 hr tablet, extended release   conjugated estrogens (PREMARIN) vaginal cream   No Yes   Sig: Insert 1 gram per vagina daily at bedtime x2 weeks, and then change to 1 gram twice weekly   cycloSPORINE (RESTASIS) 0 05 % ophthalmic emulsion  Self Yes Yes   Sig: Administer 1 drop to both eyes 2 (two) times a day   dicyclomine (BENTYL) 20 mg tablet  Self No Yes   Sig: Take 1 tablet (20 mg total) by mouth every 6 (six) hours as needed (pain)   famotidine (PEPCID) 20 mg tablet   No Yes   Sig: Take 1 tablet (20 mg total) by mouth 2 (two) times a day   fluconazole (DIFLUCAN) 150 mg tablet Not Taking at Unknown time  Yes No   Sig: fluconazole 150 mg tablet   gabapentin (NEURONTIN) 400 mg capsule  Self No No   Sig: Take 1 capsule (400 mg total) by mouth 3 (three) times a day for 30 days   gabapentin (NEURONTIN) 400 mg capsule   Yes Yes   glucose blood (FREESTYLE LITE) test strip  Self No Yes   Si each by Other route 3 (three) times a day   insulin aspart (NOVOLOG FLEXPEN) 100 Units/mL injection pen  Self No Yes   Sig: Inject 6 Units under the skin 3 (three) times a day with meals   lamoTRIgine (LaMICtal) 25 mg tablet   Yes Yes   Sig: lamotrigine 25 mg tablet   lidocaine (XYLOCAINE) 5 % ointment   No Yes   Sig: APPLY TWO GRAMS TWICE A DAY TOPICALLY AS NEEDED FOR MILD PAIN   ondansetron (ZOFRAN-ODT) 4 mg disintegrating tablet   No Yes   Sig: Take 1 tablet (4 mg total) by mouth every 8 (eight) hours as needed for nausea or vomiting   oxyCODONE-acetaminophen (PERCOCET) 7 5-325 MG per tablet   No Yes   Sig: Take 1 tablet by mouth every 6 (six) hours as needed (pain)Max Daily Amount: 4 tablets      Facility-Administered Medications: None       Past Medical History:   Diagnosis Date    ADHD     Anemia of chronic disease     Anxiety     Asthma     Asthma     Borderline personality disorder ([place] [address] )     Cataplexy     Chronic abdominal pain     CKD (chronic kidney disease) stage 3, GFR 30-59 ml/min (HCC)     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[place] [address] )     Cushing syndrome ([place] [address] )     Diabetes mellitus ([place] [address] )     DVT (deep venous thrombosis) ([place] [address] )     History of acute pancreatitis     felt secondary to Bactrim    HTN (hypertension)     Hypertension     Liver disease     fatty liver    Microscopic polyangiitis (HCC)     Morbid obesity (HCC)     MPA (microscopic polyangiitis) (HCC)     Ovarian cyst     PTSD (post-traumatic stress disorder)     Renal disorder     Self-inflicted injury     self inflicted skin wounds    Wegener's granulomatosis with renal involvement ([place] [address] )        Past Surgical History:   Procedure Laterality Date    ESOPHAGOGASTRODUODENOSCOPY  2015    mild antral gastritis    NH COLONOSCOPY FLX DX W/COLLJ SPEC WHEN PFRMD N/A 2018    Procedure: COLONOSCOPY with polypectomy;  Surgeon: Micaela Mo MD;  Location: [facility]; Service: Gastroenterology    NH ESOPHAGOGASTRODUODENOSCOPY TRANSORAL DIAGNOSTIC N/A 2018    Procedure: ESOPHAGOGASTRODUODENOSCOPY (EGD) with biopsy;  Surgeon: Micaela Mo MD;  Location: [facility]; Service: Gastroenterology    RELEASE SCAR CONTRACTURE / GRAFT REPAIRS OF HAND         Family History   Problem Relation Age of Onset    No Known Problems Mother     No Known Problems Father     Colon cancer Neg Hx     Drug abuse Neg Hx         mother father    Mental illness Neg Hx         disorder, mother father    Cancer Neg Hx     Breast cancer Neg Hx      I have reviewed and agree with the history as documented      Social History     Tobacco Use    Smoking status: Former Smoker     Types: Cigarettes     Last attempt to quit: 2010     Years since quittin 6    Smokeless tobacco: Former User   Substance Use Topics    Alcohol use: No     Frequency: Never    Drug use: Yes     Types: Marijuana     Comment: marijuana daily Review of Systems   Constitutional: Negative for appetite change, fatigue and fever  HENT: Negative for rhinorrhea and sore throat  Respiratory: Negative for cough, shortness of breath and wheezing  Cardiovascular: Negative for chest pain and leg swelling  Gastrointestinal: Negative for abdominal pain, diarrhea and vomiting  Genitourinary: Negative for dysuria and flank pain  Musculoskeletal: Negative for back pain and neck pain  Skin: Negative for rash  Neurological: Negative for syncope and headaches  Psychiatric/Behavioral:        Mood normal       Physical Exam  Physical Exam   Constitutional: She is oriented to person, place, and time  She appears well-developed and well-nourished  HENT:   Head: Normocephalic and atraumatic  Neck: Normal range of motion  Neck supple  Cardiovascular: Normal rate and regular rhythm  Pulmonary/Chest: Effort normal and breath sounds normal    Abdominal: Soft  There is no tenderness  Musculoskeletal:   L hip tenderness, FROM with pain, +n/v intact, no redness   Neurological: She is alert and oriented to person, place, and time  Skin: Skin is warm and dry  Nursing note and vitals reviewed        Vital Signs  ED Triage Vitals   Temperature Pulse Respirations Blood Pressure SpO2   08/21/19 1656 08/21/19 1655 08/21/19 1655 08/21/19 1655 08/21/19 1655   98 6 °F (37 °C) 80 16 141/71 97 %      Temp Source Heart Rate Source Patient Position - Orthostatic VS BP Location FiO2 (%)   08/21/19 1656 08/21/19 1655 08/21/19 1655 08/21/19 1655 --   Oral Monitor Sitting Left arm       Pain Score       08/21/19 1655       7           Vitals:    08/21/19 1655 08/21/19 1900   BP: 141/71 136/77   Pulse: 80 74   Patient Position - Orthostatic VS: Sitting Lying         Visual Acuity      ED Medications  Medications   oxyCODONE-acetaminophen (PERCOCET) 5-325 mg per tablet 2 tablet (2 tablets Oral Given 8/21/19 6276)       Diagnostic Studies  Results Reviewed     None CT hip left without contrast   Final Result by Benji Arita MD (08/21 1823)      No fracture, dislocation or other acute abnormality in the left hip  Workstation performed: [id number]                    Procedures  Procedures       ED Course                               MDM  Number of Diagnoses or Management Options  Contusion of left hip, initial encounter:      Amount and/or Complexity of Data Reviewed  Tests in the radiology section of CPT®: ordered and reviewed    Risk of Complications, Morbidity, and/or Mortality  Presenting problems: moderate        Disposition  Final diagnoses:   Contusion of left hip, initial encounter     Time reflects when diagnosis was documented in both MDM as applicable and the Disposition within this note     Time User Action Codes Description Comment    8/21/2019  6:44 PM Shanice Orellana Add [[id number]] Contusion of left hip, initial encounter       ED Disposition     ED Disposition Condition Date/Time Comment    Discharge Stable Wed Aug 21, 2019  6:44 PM Stephani Ozuna discharge to home/self care              Follow-up Information     Follow up With Specialties Details Why Contact Info Additional Information    Tosha Bowles PA-C Family Medicine, Physician Assistant   [address]  [address] [facility] [address]        [address] Orthopedic Surgery   [address]  [place] [address] [place], [place] [id number] [phone] [address], [address] [facility],  [address] [place] [address], [phone]          Discharge Medication List as of 8/21/2019  7:04 PM      CONTINUE these medications which have NOT CHANGED    Details   albuterol (VENTOLIN HFA) 90 mcg/act inhaler Inhale 2 puffs every 6 (six) hours as needed for wheezing, Starting Wed 12/12/2018, Normal      Alcohol Swabs (ALCOHOL PADS) 70 % PADS by Does not apply route 4 (four) times a day, Starting Fri 6/7/2019, Normal      amLODIPine (NORVASC) 5 mg tablet Take 1 tablet (5 mg total) by mouth daily, Starting Tue 1/15/2019, Normal      amphetamine-dextroamphetamine (ADDERALL XR) 20 MG 24 hr capsule Take 1 capsule (20 mg total) by mouth 2 (two) times a dayMax Daily Amount: 40 mg, Starting Wed 8/21/2019, Normal      Blood Glucose Monitoring Suppl (FREESTYLE LITE) DEIRDRE by Does not apply route daily, Starting Tue 5/21/2019, Normal      buPROPion (WELLBUTRIN XL) 150 mg 24 hr tablet bupropion HCl  mg 24 hr tablet, extended release, Historical Med      conjugated estrogens (PREMARIN) vaginal cream Insert 1 gram per vagina daily at bedtime x2 weeks, and then change to 1 gram twice weekly, Normal      cycloSPORINE (RESTASIS) 0 05 % ophthalmic emulsion Administer 1 drop to both eyes 2 (two) times a day, Historical Med      dicyclomine (BENTYL) 20 mg tablet Take 1 tablet (20 mg total) by mouth every 6 (six) hours as needed (pain), Starting Sun 10/7/2018, Print      famotidine (PEPCID) 20 mg tablet Take 1 tablet (20 mg total) by mouth 2 (two) times a day, Starting Wed 7/10/2019, Normal      gabapentin (NEURONTIN) 400 mg capsule Starting Tue 7/23/2019, Historical Med      glucose blood (FREESTYLE LITE) test strip 1 each by Other route 3 (three) times a day, Starting Tue 3/19/2019, Normal      Humidifiers (COOL MIST HUMIDIFIER 1 GALLON) MISC by Does not apply route as needed (shortness of breath), Starting Tue 2/27/2018, Print      insulin aspart (NOVOLOG FLEXPEN) 100 Units/mL injection pen Inject 6 Units under the skin 3 (three) times a day with meals, Starting Tue 5/21/2019, Normal      Insulin Pen Needle (PEN NEEDLES) 31G X 8 MM MISC Inject 1 Stick under the skin 4 (four) times a day (with meals and at bedtime), Starting Wed 3/28/2018, Normal      lamoTRIgine (LaMICtal) 25 mg tablet lamotrigine 25 mg tablet, Historical Med      Lancets (FREESTYLE) lancets by Other route 3 (three) times a day, Starting Wed 4/17/2019, Normal      lidocaine (XYLOCAINE) 5 % ointment APPLY TWO GRAMS TWICE A DAY TOPICALLY AS NEEDED FOR MILD PAIN, Normal      MULTIPLE VITAMIN PO Take 1 capsule by mouth daily, Historical Med      ondansetron (ZOFRAN-ODT) 4 mg disintegrating tablet Take 1 tablet (4 mg total) by mouth every 8 (eight) hours as needed for nausea or vomiting, Starting Wed 6/12/2019, Normal      oxyCODONE-acetaminophen (PERCOCET) 7 5-325 MG per tablet Take 1 tablet by mouth every 6 (six) hours as needed (pain)Max Daily Amount: 4 tablets, Starting Thu 8/1/2019, Normal      Probiotic Product (PROBIOTIC-10 PO) Take 1 tablet by mouth daily, Historical Med      QUEtiapine (SEROquel) 100 mg tablet Take 1 tablet (100 mg total) by mouth daily at bedtime, Starting Tue 5/7/2019, Normal      TiZANidine (ZANAFLEX) 4 MG capsule Take 1 capsule (4 mg total) by mouth 3 (three) times a day, Starting Tue 5/21/2019, Normal      TOUJEO MAX SOLOSTAR 300 units/mL CONCETRATED U-300 injection pen INJECT 22 UNITS UNDER THE SKIN DAILY, Normal      Triamcinolone Acetonide 55 MCG/ACT AERO 1 Act (55 mcg total) into each nostril daily, Starting Tue 8/6/2019, Normal      fluconazole (DIFLUCAN) 150 mg tablet fluconazole 150 mg tablet, Historical Med           No discharge procedures on file      ED Provider  Electronically Signed by           Mateo Silva MD  08/28/19 9768

## 2019-08-21 NOTE — ED NOTES
When patient was asked why she was in a scooter upon arrival she stated "because I fall a lot and I have a lot of pain"      Iris Langley RN  08/21/19 5524

## 2019-09-02 ENCOUNTER — HOSPITAL ENCOUNTER (EMERGENCY)
Facility: HOSPITAL | Age: 49
Discharge: HOME | End: 2019-09-02
Attending: EMERGENCY MEDICINE | Admitting: EMERGENCY MEDICINE
Payer: COMMERCIAL

## 2019-09-02 VITALS
SYSTOLIC BLOOD PRESSURE: 140 MMHG | DIASTOLIC BLOOD PRESSURE: 69 MMHG | OXYGEN SATURATION: 95 % | RESPIRATION RATE: 18 BRPM | TEMPERATURE: 97.9 F | HEART RATE: 80 BPM

## 2019-09-02 DIAGNOSIS — A59.01 TRICHOMONAL VAGINITIS: Primary | ICD-10-CM

## 2019-09-02 DIAGNOSIS — N89.8 VAGINAL DISCHARGE: ICD-10-CM

## 2019-09-02 LAB
BACTERIA UR QL AUTO: ABNORMAL /HPF
BILIRUB UR QL STRIP: NEGATIVE
CLARITY UR: CLEAR
COLOR UR: YELLOW
EXT PREG TEST URINE: NEGATIVE
EXT. CONTROL ED NAV: NORMAL
GLUCOSE UR STRIP-MCNC: NEGATIVE MG/DL
HGB UR QL STRIP.AUTO: ABNORMAL
KETONES UR STRIP-MCNC: NEGATIVE MG/DL
LEUKOCYTE ESTERASE UR QL STRIP: ABNORMAL
NITRITE UR QL STRIP: NEGATIVE
NON-SQ EPI CELLS URNS QL MICRO: ABNORMAL /HPF
OTHER STN SPEC: ABNORMAL
PH UR STRIP.AUTO: 5.5 [PH] (ref 4.5–8)
PROT UR STRIP-MCNC: >=300 MG/DL
RBC #/AREA URNS AUTO: ABNORMAL /HPF
SP GR UR STRIP.AUTO: >=1.03 (ref 1–1.03)
UROBILINOGEN UR QL STRIP.AUTO: 0.2 E.U./DL
WBC #/AREA URNS AUTO: ABNORMAL /HPF

## 2019-09-02 PROCEDURE — 87086 URINE CULTURE/COLONY COUNT: CPT

## 2019-09-02 PROCEDURE — 99283 EMERGENCY DEPT VISIT LOW MDM: CPT | Performed by: EMERGENCY MEDICINE

## 2019-09-02 PROCEDURE — 81001 URINALYSIS AUTO W/SCOPE: CPT

## 2019-09-02 PROCEDURE — 99283 EMERGENCY DEPT VISIT LOW MDM: CPT

## 2019-09-02 PROCEDURE — 81025 URINE PREGNANCY TEST: CPT | Performed by: EMERGENCY MEDICINE

## 2019-09-02 PROCEDURE — 87491 CHLMYD TRACH DNA AMP PROBE: CPT | Performed by: EMERGENCY MEDICINE

## 2019-09-02 PROCEDURE — 87591 N.GONORRHOEAE DNA AMP PROB: CPT | Performed by: EMERGENCY MEDICINE

## 2019-09-02 RX ORDER — METRONIDAZOLE 500 MG/1
500 TABLET ORAL EVERY 12 HOURS SCHEDULED
Qty: 14 TABLET | Refills: 0 | Status: SHIPPED | OUTPATIENT
Start: 2019-09-02 | End: 2019-09-10

## 2019-09-02 NOTE — ED PROVIDER NOTES
History  Chief Complaint   Patient presents with    Medical Problem     pt states "I need to see a doctor" when asked regarding what her problem was pt stated "I'm not gonna tell you"  Patient had unprotected sexual intercourse 4 days ago and has had some white vaginal discharge since then  She has not been sexually active in 2 years  She states that when she had these symptoms before it was Trichomonas  She denies any abdominal pain, pelvic pain, dysuria, fevers, or nausea/vomiting  Prior to Admission Medications   Prescriptions Last Dose Informant Patient Reported? Taking?    Alcohol Swabs (ALCOHOL PADS) 70 % PADS  Self No No   Sig: by Does not apply route 4 (four) times a day   Blood Glucose Monitoring Suppl (FREESTYLE LITE) DEIRDRE  Self No No   Sig: by Does not apply route daily   Humidifiers (COOL MIST HUMIDIFIER 1 GALLON) MISC  Self No No   Sig: by Does not apply route as needed (shortness of breath)   Insulin Pen Needle (PEN NEEDLES) 31G X 8 MM MISC  Self No No   Sig: Inject 1 Stick under the skin 4 (four) times a day (with meals and at bedtime)   Lancets (FREESTYLE) lancets  Self No No   Sig: by Other route 3 (three) times a day   MULTIPLE VITAMIN PO  Self Yes No   Sig: Take 1 capsule by mouth daily   Probiotic Product (PROBIOTIC-10 PO)  Self Yes No   Sig: Take 1 tablet by mouth daily   QUEtiapine (SEROquel) 100 mg tablet  Self No No   Sig: Take 1 tablet (100 mg total) by mouth daily at bedtime   TOUJEO MAX SOLOSTAR 300 units/mL CONCETRATED U-300 injection pen   No No   Sig: INJECT 22 UNITS UNDER THE SKIN DAILY   TiZANidine (ZANAFLEX) 4 MG capsule  Self No No   Sig: Take 1 capsule (4 mg total) by mouth 3 (three) times a day   Triamcinolone Acetonide 55 MCG/ACT AERO   No No   Si Act (55 mcg total) into each nostril daily   albuterol (VENTOLIN HFA) 90 mcg/act inhaler  Self No No   Sig: Inhale 2 puffs every 6 (six) hours as needed for wheezing   amLODIPine (NORVASC) 5 mg tablet  Self No No Sig: Take 1 tablet (5 mg total) by mouth daily   amphetamine-dextroamphetamine (ADDERALL XR) 20 MG 24 hr capsule   No No   Sig: Take 1 capsule (20 mg total) by mouth 2 (two) times a dayMax Daily Amount: 40 mg   buPROPion (WELLBUTRIN XL) 150 mg 24 hr tablet   Yes No   Sig: bupropion HCl  mg 24 hr tablet, extended release   conjugated estrogens (PREMARIN) vaginal cream   No No   Sig: Insert 1 gram per vagina daily at bedtime x2 weeks, and then change to 1 gram twice weekly   cycloSPORINE (RESTASIS) 0 05 % ophthalmic emulsion  Self Yes No   Sig: Administer 1 drop to both eyes 2 (two) times a day   dicyclomine (BENTYL) 20 mg tablet  Self No No   Sig: Take 1 tablet (20 mg total) by mouth every 6 (six) hours as needed (pain)   famotidine (PEPCID) 20 mg tablet   No No   Sig: Take 1 tablet (20 mg total) by mouth 2 (two) times a day   fluconazole (DIFLUCAN) 150 mg tablet   Yes No   Sig: fluconazole 150 mg tablet   gabapentin (NEURONTIN) 400 mg capsule  Self No No   Sig: Take 1 capsule (400 mg total) by mouth 3 (three) times a day for 30 days   gabapentin (NEURONTIN) 400 mg capsule   Yes No   glucose blood (FREESTYLE LITE) test strip  Self No No   Si each by Other route 3 (three) times a day   insulin aspart (NOVOLOG FLEXPEN) 100 Units/mL injection pen  Self No No   Sig: Inject 6 Units under the skin 3 (three) times a day with meals   lamoTRIgine (LaMICtal) 25 mg tablet   Yes No   Sig: lamotrigine 25 mg tablet   lidocaine (XYLOCAINE) 5 % ointment   No No   Sig: APPLY TWO GRAMS TWICE A DAY TOPICALLY AS NEEDED FOR MILD PAIN   ondansetron (ZOFRAN-ODT) 4 mg disintegrating tablet   No No   Sig: Take 1 tablet (4 mg total) by mouth every 8 (eight) hours as needed for nausea or vomiting   oxyCODONE-acetaminophen (PERCOCET) 7 5-325 MG per tablet   No No   Sig: Take 1 tablet by mouth every 6 (six) hours as needed (pain)Max Daily Amount: 4 tablets      Facility-Administered Medications: None       Past Medical History: Diagnosis Date    ADHD     Anemia of chronic disease     Anxiety     Asthma     Asthma     Borderline personality disorder (HCC)     Cataplexy     Chronic abdominal pain     CKD (chronic kidney disease) stage 3, GFR 30-59 ml/min (HCC)     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HCC)     [facility]     [facility]     DVT (deep venous thrombosis) (HCC)     History of acute pancreatitis     felt secondary to Bactrim    HTN (hypertension)     Hypertension     Liver disease     fatty liver    Microscopic polyangiitis (HCC)     Morbid obesity (HCC)     MPA (microscopic polyangiitis) (HCC)     Ovarian cyst     PTSD (post-traumatic stress disorder)     Renal disorder     Self-inflicted injury     self inflicted skin wounds    Wegener's granulomatosis with renal involvement ([facility] )        Past Surgical History:   Procedure Laterality Date    ESOPHAGOGASTRODUODENOSCOPY  2015    mild antral gastritis    ME COLONOSCOPY FLX DX W/COLLJ SPEC WHEN PFRMD N/A 2018    Procedure: COLONOSCOPY with polypectomy;  Surgeon: Radha Lainez MD;  Location: [facility]; Service: Gastroenterology    ME ESOPHAGOGASTRODUODENOSCOPY TRANSORAL DIAGNOSTIC N/A 2018    Procedure: ESOPHAGOGASTRODUODENOSCOPY (EGD) with biopsy;  Surgeon: Radha Lainez MD;  Location: [facility]; Service: Gastroenterology    RELEASE SCAR CONTRACTURE / GRAFT REPAIRS OF HAND         Family History   Problem Relation Age of Onset    No Known Problems Mother     No Known Problems Father     Colon cancer Neg Hx     Drug abuse Neg Hx         mother father    Mental illness Neg Hx         disorder, mother father    Cancer Neg Hx     Breast cancer Neg Hx      I have reviewed and agree with the history as documented      Social History     Tobacco Use    Smoking status: Former Smoker     Types: Cigarettes     Last attempt to quit: 2010     Years since quittin 6    Smokeless tobacco: Former User   Substance Use Topics    Alcohol use: No     Frequency: Never    Drug use: Yes     Types: Marijuana     Comment: marijuana daily        Review of Systems   Constitutional: Negative for appetite change, fatigue and fever  HENT: Negative for rhinorrhea and sore throat  Respiratory: Negative for cough, shortness of breath and wheezing  Cardiovascular: Negative for chest pain and leg swelling  Gastrointestinal: Negative for abdominal pain, diarrhea and vomiting  Genitourinary: Positive for vaginal discharge  Negative for decreased urine volume, difficulty urinating, dysuria, flank pain, hematuria, pelvic pain and vaginal pain  Musculoskeletal: Negative for back pain and neck pain  Skin: Negative for rash  Neurological: Negative for syncope and headaches  Psychiatric/Behavioral:        Mood normal       Physical Exam  Physical Exam   Constitutional: She is oriented to person, place, and time  She appears well-developed and well-nourished  HENT:   Head: Normocephalic and atraumatic  Neck: Normal range of motion  Neck supple  Cardiovascular: Normal rate and regular rhythm  Pulmonary/Chest: Effort normal and breath sounds normal    Abdominal: Soft  There is no tenderness  Musculoskeletal: Normal range of motion  Neurological: She is alert and oriented to person, place, and time  Skin: Skin is warm and dry  Nursing note and vitals reviewed        Vital Signs  ED Triage Vitals [09/02/19 0848]   Temperature Pulse Respirations Blood Pressure SpO2   97 9 °F (36 6 °C) 80 18 140/69 95 %      Temp Source Heart Rate Source Patient Position - Orthostatic VS BP Location FiO2 (%)   Temporal Monitor Sitting Right arm --      Pain Score       No Pain           Vitals:    09/02/19 0848   BP: 140/69   Pulse: 80   Patient Position - Orthostatic VS: Sitting         Visual Acuity      ED Medications  Medications - No data to display    Diagnostic Studies  Results Reviewed     Procedure Component Value Units Date/Time [facility] amplified DNA by PCR [[id number]] Collected:  09/02/19 0933    Lab Status: In process Specimen:  Urine, Other Updated:  09/02/19 0936    POCT pregnancy, urine [[id number]]  (Normal) Resulted:  09/02/19 0933    Lab Status:  Final result Updated:  09/02/19 0933     EXT PREG TEST UR (Ref: Negative) negative     Control valid    POCT urinalysis dipstick [[id number]]  (Abnormal) Resulted:  09/02/19 0933    Lab Status:  Final result Specimen:  Urine Updated:  09/02/19 0933    ED Urine Macroscopic [[id number]]  (Abnormal) Collected:  09/02/19 0931    Lab Status:  Final result Specimen:  Urine Updated:  09/02/19 0932     Color, UA Yellow     Clarity, UA Clear     pH, UA 5 5     Leukocytes, UA Small     Nitrite, UA Negative     Protein, UA >=300 mg/dl      Glucose, UA Negative mg/dl      Ketones, UA Negative mg/dl      Urobilinogen, UA 0 2 E U /dl      Bilirubin, UA Negative     Blood, UA Small     Specific [place], UA >=1 030    Narrative:       CLINITEK RESULT    Urine Microscopic [[id number]] Collected:  09/02/19 0931    Lab Status:  No result Specimen:  Urine, Clean Catch                  No orders to display              Procedures  Procedures       ED Course                               MDM  Number of Diagnoses or Management Options  Trichomonal vaginitis:   Vaginal discharge:      Amount and/or Complexity of Data Reviewed  Clinical lab tests: ordered and reviewed    Risk of Complications, Morbidity, and/or Mortality  Presenting problems: moderate  General comments: Patient has an OBGYN doctor to follow-up and prefers to have a pelvic exam by them  We will send the urine for gonorrhea/chlamydia testing and treat for presumed Trichomonas infection          Disposition  Final diagnoses:   Trichomonal vaginitis   Vaginal discharge     Time reflects when diagnosis was documented in both MDM as applicable and the Disposition within this note     Time User Action Codes Description Comment    9/2/2019  9:24 AM Nimo Moreno Add [A59 01] Trichomonal vaginitis     9/2/2019  9:32 AM Lopez-Rodriguez, Ted Lesch Add [N89 8] Vaginal discharge       ED Disposition     ED Disposition Condition Date/Time Comment    Discharge Stable Mon Sep 2, 2019  9:23 AM Gabi Shipman discharge to home/self care              Follow-up Information     Follow up With Specialties Details Why Contact Info Additional Information    William Jc PA-C Family Medicine, Physician Assistant   [address]  [address] [facility]  [address]  [address]  Obstetrics and Gynecology   [address]  [address] [facility] [address]  [facility], [address] [place] [address], [place], [place], [address]          Discharge Medication List as of 9/2/2019  9:34 AM      START taking these medications    Details   metroNIDAZOLE (FLAGYL) 500 mg tablet Take 1 tablet (500 mg total) by mouth every 12 (twelve) hours for 7 days, Starting Mon 9/2/2019, Until Mon 9/9/2019, Print         CONTINUE these medications which have NOT CHANGED    Details   albuterol (VENTOLIN HFA) 90 mcg/act inhaler Inhale 2 puffs every 6 (six) hours as needed for wheezing, Starting Wed 12/12/2018, Normal      Alcohol Swabs (ALCOHOL PADS) 70 % PADS by Does not apply route 4 (four) times a day, Starting Fri 6/7/2019, Normal      amLODIPine (NORVASC) 5 mg tablet Take 1 tablet (5 mg total) by mouth daily, Starting Tue 1/15/2019, Normal      amphetamine-dextroamphetamine (ADDERALL XR) 20 MG 24 hr capsule Take 1 capsule (20 mg total) by mouth 2 (two) times a dayMax Daily Amount: 40 mg, Starting Wed 8/21/2019, Normal      Blood Glucose Monitoring Suppl (FREESTYLE LITE) DEIRDRE by Does not apply route daily, Starting Tue 5/21/2019, Normal      buPROPion (WELLBUTRIN XL) 150 mg 24 hr tablet bupropion HCl  mg 24 hr tablet, extended release, Historical Med      conjugated estrogens (PREMARIN) vaginal cream Insert 1 gram per vagina daily at bedtime x2 weeks, and then change to 1 gram twice weekly, Normal      cycloSPORINE (RESTASIS) 0 05 % ophthalmic emulsion Administer 1 drop to both eyes 2 (two) times a day, Historical Med      dicyclomine (BENTYL) 20 mg tablet Take 1 tablet (20 mg total) by mouth every 6 (six) hours as needed (pain), Starting Sun 10/7/2018, Print      famotidine (PEPCID) 20 mg tablet Take 1 tablet (20 mg total) by mouth 2 (two) times a day, Starting Wed 7/10/2019, Normal      fluconazole (DIFLUCAN) 150 mg tablet fluconazole 150 mg tablet, Historical Med      gabapentin (NEURONTIN) 400 mg capsule Starting Tue 7/23/2019, Historical Med      glucose blood (FREESTYLE LITE) test strip 1 each by Other route 3 (three) times a day, Starting Tue 3/19/2019, Normal      Humidifiers (COOL MIST HUMIDIFIER 1 GALLON) MISC by Does not apply route as needed (shortness of breath), Starting Tue 2/27/2018, Print      insulin aspart (NOVOLOG FLEXPEN) 100 Units/mL injection pen Inject 6 Units under the skin 3 (three) times a day with meals, Starting Tue 5/21/2019, Normal      Insulin Pen Needle (PEN NEEDLES) 31G X 8 MM MISC Inject 1 Stick under the skin 4 (four) times a day (with meals and at bedtime), Starting Wed 3/28/2018, Normal      lamoTRIgine (LaMICtal) 25 mg tablet lamotrigine 25 mg tablet, Historical Med      Lancets (FREESTYLE) lancets by Other route 3 (three) times a day, Starting Wed 4/17/2019, Normal      lidocaine (XYLOCAINE) 5 % ointment APPLY TWO GRAMS TWICE A DAY TOPICALLY AS NEEDED FOR MILD PAIN, Normal      MULTIPLE VITAMIN PO Take 1 capsule by mouth daily, Historical Med      ondansetron (ZOFRAN-ODT) 4 mg disintegrating tablet Take 1 tablet (4 mg total) by mouth every 8 (eight) hours as needed for nausea or vomiting, Starting Wed 6/12/2019, Normal      oxyCODONE-acetaminophen (PERCOCET) 7 5-325 MG per tablet Take 1 tablet by mouth every 6 (six) hours as needed (pain)Max Daily Amount: 4 tablets, Starting Thu 8/1/2019, Normal      Probiotic Product (PROBIOTIC-10 PO) Take 1 tablet by mouth daily, Historical Med      QUEtiapine (SEROquel) 100 mg tablet Take 1 tablet (100 mg total) by mouth daily at bedtime, Starting Tue 5/7/2019, Normal      TiZANidine (ZANAFLEX) 4 MG capsule Take 1 capsule (4 mg total) by mouth 3 (three) times a day, Starting Tue 5/21/2019, Normal      TOUJEO MAX SOLOSTAR 300 units/mL CONCETRATED U-300 injection pen INJECT 22 UNITS UNDER THE SKIN DAILY, Normal      Triamcinolone Acetonide 55 MCG/ACT AERO 1 Act (55 mcg total) into each nostril daily, Starting Tue 8/6/2019, Normal           No discharge procedures on file      ED Provider  Electronically Signed by           Brian Jones MD  09/02/19 7490

## 2019-09-03 DIAGNOSIS — IMO0001 IDDM (INSULIN DEPENDENT DIABETES MELLITUS): Chronic | ICD-10-CM

## 2019-09-03 DIAGNOSIS — G89.4 CHRONIC PAIN SYNDROME: ICD-10-CM

## 2019-09-03 DIAGNOSIS — M31.30 WEGENER'S GRANULOMATOSIS: Chronic | ICD-10-CM

## 2019-09-03 LAB
C TRACH DNA SPEC QL NAA+PROBE: NEGATIVE
N GONORRHOEA DNA SPEC QL NAA+PROBE: NEGATIVE

## 2019-09-04 DIAGNOSIS — M31.30 WEGENER'S GRANULOMATOSIS: Chronic | ICD-10-CM

## 2019-09-04 DIAGNOSIS — M31.7 MPA (MICROSCOPIC POLYANGIITIS) (HCC): Primary | Chronic | ICD-10-CM

## 2019-09-04 DIAGNOSIS — G47.411 CATAPLEXY: ICD-10-CM

## 2019-09-04 DIAGNOSIS — R29.898 WEAKNESS OF BOTH LOWER EXTREMITIES: ICD-10-CM

## 2019-09-04 LAB — BACTERIA UR CULT: NORMAL

## 2019-09-04 RX ORDER — OXYCODONE AND ACETAMINOPHEN 7.5; 325 MG/1; MG/1
1 TABLET ORAL EVERY 6 HOURS PRN
Qty: 120 TABLET | Refills: 0 | Status: SHIPPED | OUTPATIENT
Start: 2019-09-04 | End: 2019-10-02 | Stop reason: SDUPTHER

## 2019-09-04 RX ORDER — BLOOD-GLUCOSE METER
KIT MISCELLANEOUS
Qty: 100 EACH | Refills: 11 | Status: SHIPPED | OUTPATIENT
Start: 2019-09-04 | End: 2020-09-09 | Stop reason: SDUPTHER

## 2019-09-10 ENCOUNTER — HOSPITAL ENCOUNTER (EMERGENCY)
Facility: HOSPITAL | Age: 49
Discharge: HOME | End: 2019-09-10
Attending: EMERGENCY MEDICINE
Payer: COMMERCIAL

## 2019-09-10 VITALS
BODY MASS INDEX: 34.2 KG/M2 | TEMPERATURE: 98.2 F | RESPIRATION RATE: 18 BRPM | OXYGEN SATURATION: 96 % | HEART RATE: 83 BPM | WEIGHT: 187 LBS | SYSTOLIC BLOOD PRESSURE: 151 MMHG | DIASTOLIC BLOOD PRESSURE: 89 MMHG

## 2019-09-10 DIAGNOSIS — R53.83 FATIGUE: Primary | ICD-10-CM

## 2019-09-10 LAB
ANION GAP SERPL CALCULATED.3IONS-SCNC: 10 MMOL/L (ref 4–13)
BACTERIA UR QL AUTO: ABNORMAL /HPF
BASOPHILS # BLD AUTO: 0.02 THOUSANDS/ΜL (ref 0–0.1)
BASOPHILS NFR BLD AUTO: 0 % (ref 0–1)
BILIRUB UR QL STRIP: NEGATIVE
BUN SERPL-MCNC: 25 MG/DL (ref 5–25)
CALCIUM SERPL-MCNC: 9 MG/DL (ref 8.3–10.1)
CHLORIDE SERPL-SCNC: 107 MMOL/L (ref 100–108)
CLARITY UR: CLEAR
CLARITY, POC: CLEAR
CO2 SERPL-SCNC: 24 MMOL/L (ref 21–32)
COLOR UR: YELLOW
COLOR, POC: YELLOW
CREAT SERPL-MCNC: 2.26 MG/DL (ref 0.6–1.3)
EOSINOPHIL # BLD AUTO: 0.03 THOUSAND/ΜL (ref 0–0.61)
EOSINOPHIL NFR BLD AUTO: 1 % (ref 0–6)
ERYTHROCYTE [DISTWIDTH] IN BLOOD BY AUTOMATED COUNT: 13.2 % (ref 11.6–15.1)
EXT PREG TEST URINE: NEGATIVE
EXT. CONTROL ED NAV: NORMAL
GFR SERPL CREATININE-BSD FRML MDRD: 25 ML/MIN/1.73SQ M
GLUCOSE SERPL-MCNC: 157 MG/DL (ref 65–140)
GLUCOSE UR STRIP-MCNC: NEGATIVE MG/DL
HCT VFR BLD AUTO: 39.6 % (ref 34.8–46.1)
HGB BLD-MCNC: 12.6 G/DL (ref 11.5–15.4)
HGB UR QL STRIP.AUTO: ABNORMAL
IMM GRANULOCYTES # BLD AUTO: 0.01 THOUSAND/UL (ref 0–0.2)
IMM GRANULOCYTES NFR BLD AUTO: 0 % (ref 0–2)
KETONES UR STRIP-MCNC: NEGATIVE MG/DL
LEUKOCYTE ESTERASE UR QL STRIP: ABNORMAL
LYMPHOCYTES # BLD AUTO: 1.18 THOUSANDS/ΜL (ref 0.6–4.47)
LYMPHOCYTES NFR BLD AUTO: 18 % (ref 14–44)
MCH RBC QN AUTO: 30.4 PG (ref 26.8–34.3)
MCHC RBC AUTO-ENTMCNC: 31.8 G/DL (ref 31.4–37.4)
MCV RBC AUTO: 96 FL (ref 82–98)
MONOCYTES # BLD AUTO: 0.52 THOUSAND/ΜL (ref 0.17–1.22)
MONOCYTES NFR BLD AUTO: 8 % (ref 4–12)
NEUTROPHILS # BLD AUTO: 4.83 THOUSANDS/ΜL (ref 1.85–7.62)
NEUTS SEG NFR BLD AUTO: 73 % (ref 43–75)
NITRITE UR QL STRIP: NEGATIVE
NON-SQ EPI CELLS URNS QL MICRO: ABNORMAL /HPF
NRBC BLD AUTO-RTO: 0 /100 WBCS
PH UR STRIP.AUTO: 5.5 [PH] (ref 4.5–8)
PLATELET # BLD AUTO: 287 THOUSANDS/UL (ref 149–390)
PMV BLD AUTO: 9.4 FL (ref 8.9–12.7)
POTASSIUM SERPL-SCNC: 4.3 MMOL/L (ref 3.5–5.3)
PROT UR STRIP-MCNC: >=300 MG/DL
RBC # BLD AUTO: 4.14 MILLION/UL (ref 3.81–5.12)
RBC #/AREA URNS AUTO: ABNORMAL /HPF
SODIUM SERPL-SCNC: 141 MMOL/L (ref 136–145)
SP GR UR STRIP.AUTO: >=1.03 (ref 1–1.03)
UROBILINOGEN UR QL STRIP.AUTO: 0.2 E.U./DL
WBC # BLD AUTO: 6.59 THOUSAND/UL (ref 4.31–10.16)
WBC #/AREA URNS AUTO: ABNORMAL /HPF

## 2019-09-10 PROCEDURE — 99284 EMERGENCY DEPT VISIT MOD MDM: CPT | Performed by: EMERGENCY MEDICINE

## 2019-09-10 PROCEDURE — 80048 BASIC METABOLIC PNL TOTAL CA: CPT | Performed by: EMERGENCY MEDICINE

## 2019-09-10 PROCEDURE — 99283 EMERGENCY DEPT VISIT LOW MDM: CPT

## 2019-09-10 PROCEDURE — 96374 THER/PROPH/DIAG INJ IV PUSH: CPT

## 2019-09-10 PROCEDURE — 85025 COMPLETE CBC W/AUTO DIFF WBC: CPT | Performed by: EMERGENCY MEDICINE

## 2019-09-10 PROCEDURE — 36415 COLL VENOUS BLD VENIPUNCTURE: CPT | Performed by: EMERGENCY MEDICINE

## 2019-09-10 PROCEDURE — 81001 URINALYSIS AUTO W/SCOPE: CPT

## 2019-09-10 PROCEDURE — 81025 URINE PREGNANCY TEST: CPT | Performed by: EMERGENCY MEDICINE

## 2019-09-10 RX ORDER — ONDANSETRON 2 MG/ML
4 INJECTION INTRAMUSCULAR; INTRAVENOUS ONCE
Status: COMPLETED | OUTPATIENT
Start: 2019-09-10 | End: 2019-09-10

## 2019-09-10 RX ADMIN — ONDANSETRON 4 MG: 2 INJECTION INTRAMUSCULAR; INTRAVENOUS at 14:04

## 2019-09-10 NOTE — ED PROVIDER NOTES
History  Chief Complaint   Patient presents with    Fatigue     Fatigue for the past 4 days  Denies any new physical pain  Nausea, no vomiting  A 72-year-old female with past medical history of ADHD, anxiety, asthma, borderline personality disorder, cataplexy, CKD, Cushing syndrome, diabetes, DVT, hypertension, microscopic polyangiitis, PTSD and Justa's granulomatosis; presents with generalized fatigue for the past four days  Patient states symptoms have been getting progressively worse since onset  Patient does complain of nausea and dry heaving, however denies vomiting  Patient otherwise denies fever, chills, chest pain, shortness of breath, abdominal pain, diarrhea, dysuria, urinary frequency/urgency, paresthesias, focal weakness, peripheral edema and rashes  A/P:  Generalized fatigue, patient resting comfortably in no acute distress  Exam within normal limits  Will check basic labs for anemia, electrolyte abnormality and renal impairment  Will give Zofran  History provided by:  Patient and medical records  Fatigue       Prior to Admission Medications   Prescriptions Last Dose Informant Patient Reported? Taking?    Alcohol Swabs (ALCOHOL PADS) 70 % PADS  Self No No   Sig: by Does not apply route 4 (four) times a day   Blood Glucose Monitoring Suppl (FREESTYLE LITE) DEIRDRE  Self No No   Sig: by Does not apply route daily   FREESTYLE LITE test strip   No No   Sig: TEST THREE TIMES A DAY AS DIRECTED   Humidifiers (COOL MIST HUMIDIFIER 1 GALLON) MISC  Self No No   Sig: by Does not apply route as needed (shortness of breath)   Insulin Pen Needle (PEN NEEDLES) 31G X 8 MM MISC  Self No No   Sig: Inject 1 Stick under the skin 4 (four) times a day (with meals and at bedtime)   Lancets (FREESTYLE) lancets  Self No No   Sig: by Other route 3 (three) times a day   MULTIPLE VITAMIN PO  Self Yes No   Sig: Take 1 capsule by mouth daily   Probiotic Product (PROBIOTIC-10 PO)  Self Yes Yes   Sig: Take 1 tablet by mouth daily   QUEtiapine (SEROquel) 100 mg tablet  Self No Yes   Sig: Take 1 tablet (100 mg total) by mouth daily at bedtime   TOUJEO MAX SOLOSTAR 300 units/mL CONCETRATED U-300 injection pen   No Yes   Sig: INJECT 22 UNITS UNDER THE SKIN DAILY   TiZANidine (ZANAFLEX) 4 MG capsule  Self No Yes   Sig: Take 1 capsule (4 mg total) by mouth 3 (three) times a day   Triamcinolone Acetonide 55 MCG/ACT AERO   No Yes   Si Act (55 mcg total) into each nostril daily   albuterol (VENTOLIN HFA) 90 mcg/act inhaler  Self No Yes   Sig: Inhale 2 puffs every 6 (six) hours as needed for wheezing   amLODIPine (NORVASC) 5 mg tablet  Self No Yes   Sig: Take 1 tablet (5 mg total) by mouth daily   amphetamine-dextroamphetamine (ADDERALL XR) 20 MG 24 hr capsule   No Yes   Sig: Take 1 capsule (20 mg total) by mouth 2 (two) times a dayMax Daily Amount: 40 mg   buPROPion (WELLBUTRIN XL) 150 mg 24 hr tablet   Yes Yes   Sig: bupropion HCl  mg 24 hr tablet, extended release   conjugated estrogens (PREMARIN) vaginal cream   No Yes   Sig: Insert 1 gram per vagina daily at bedtime x2 weeks, and then change to 1 gram twice weekly   cycloSPORINE (RESTASIS) 0 05 % ophthalmic emulsion  Self Yes Yes   Sig: Administer 1 drop to both eyes 2 (two) times a day   dicyclomine (BENTYL) 20 mg tablet Not Taking at Unknown time Self No No   Sig: Take 1 tablet (20 mg total) by mouth every 6 (six) hours as needed (pain)   Patient not taking: Reported on 9/10/2019   famotidine (PEPCID) 20 mg tablet   No Yes   Sig: Take 1 tablet (20 mg total) by mouth 2 (two) times a day   fluconazole (DIFLUCAN) 150 mg tablet Not Taking at Unknown time  Yes No   Sig: fluconazole 150 mg tablet   gabapentin (NEURONTIN) 400 mg capsule  Self No No   Sig: Take 1 capsule (400 mg total) by mouth 3 (three) times a day for 30 days   gabapentin (NEURONTIN) 400 mg capsule   Yes Yes   insulin aspart (NOVOLOG FLEXPEN) 100 Units/mL injection pen  Self No Yes   Sig: Inject 6 Units under the skin 3 (three) times a day with meals   lamoTRIgine (LaMICtal) 25 mg tablet   Yes Yes   Sig: lamotrigine 25 mg tablet   lidocaine (XYLOCAINE) 5 % ointment   No Yes   Sig: APPLY TWO GRAMS TWICE A DAY TOPICALLY AS NEEDED FOR MILD PAIN   metroNIDAZOLE (FLAGYL) 500 mg tablet   No Yes   Sig: Take 1 tablet (500 mg total) by mouth every 12 (twelve) hours for 7 days   ondansetron (ZOFRAN-ODT) 4 mg disintegrating tablet   No Yes   Sig: Take 1 tablet (4 mg total) by mouth every 8 (eight) hours as needed for nausea or vomiting   oxyCODONE-acetaminophen (PERCOCET) 7 5-325 MG per tablet   No Yes   Sig: Take 1 tablet by mouth every 6 (six) hours as needed (pain)Max Daily Amount: 4 tablets      Facility-Administered Medications: None       Past Medical History:   Diagnosis Date    ADHD     Anemia of chronic disease     Anxiety     Asthma     Asthma     Borderline personality disorder (HCC)     Cataplexy     Chronic abdominal pain     CKD (chronic kidney disease) stage 3, GFR 30-59 ml/min (HCC)     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HCC)     Cushing syndrome ([facility] [address] )     Diabetes mellitus ([facility] )     DVT (deep venous thrombosis) (HCC)     History of acute pancreatitis     felt secondary to Bactrim    HTN (hypertension)     Hypertension     Liver disease     fatty liver    Microscopic polyangiitis (HCC)     Morbid obesity (HCC)     MPA (microscopic polyangiitis) (HCC)     Ovarian cyst     PTSD (post-traumatic stress disorder)     Renal disorder     Self-inflicted injury     self inflicted skin wounds    [facility] 2015       Past Surgical History:   Procedure Laterality Date    ESOPHAGOGASTRODUODENOSCOPY  09/11/2015    mild antral gastritis    NV COLONOSCOPY FLX DX W/COLLJ SPEC WHEN PFRMD N/A 12/14/2018    Procedure: COLONOSCOPY with polypectomy;  Surgeon: Celena Ramsey MD;  Location: [facility];   Service: Gastroenterology    NV ESOPHAGOGASTRODUODENOSCOPY TRANSORAL DIAGNOSTIC N/A 2018    Procedure: ESOPHAGOGASTRODUODENOSCOPY (EGD) with biopsy;  Surgeon: Fabi Hollis MD;  Location: [facility]; Service: Gastroenterology    RELEASE SCAR CONTRACTURE / GRAFT REPAIRS OF HAND         Family History   Problem Relation Age of Onset    No Known Problems Mother     No Known Problems Father     Colon cancer Neg Hx     Drug abuse Neg Hx         mother father    Mental illness Neg Hx         disorder, mother father    Cancer Neg Hx     Breast cancer Neg Hx      I have reviewed and agree with the history as documented  Social History     Tobacco Use    Smoking status: Former Smoker     Types: Cigarettes     Last attempt to quit:      Years since quittin 6    Smokeless tobacco: Former User   Substance Use Topics    Alcohol use: No     Frequency: Never    Drug use: Yes     Types: Marijuana     Comment: marijuana daily        Review of Systems   Constitutional: Positive for fatigue  All other systems reviewed and are negative        Physical Exam  Physical Exam  General Appearance: alert and oriented, nad, non toxic appearing  Skin:  Warm, dry, intact  HEENT: atraumatic, normocephalic  Neck: Supple, trachea midline  Cardiac: RRR; no murmurs, rub, gallops  Pulmonary: lungs CTAB; no wheezes, rales, rhonchi  Gastrointestinal: abdomen soft, nontender, nondistended; no guarding or rebound tenderness; good bowel sounds, no mass or bruits  Extremities:  no pedal edema, 2+ pulses; no calf tenderness, no clubbing, no cyanosis  Neuro:  no focal motor or sensory deficits, CN 2-12 grossly intact  Psych:  Normal mood and affect, normal judgement and insight      Vital Signs  ED Triage Vitals [09/10/19 1323]   Temperature Pulse Respirations Blood Pressure SpO2   98 2 °F (36 8 °C) 92 18 146/91 96 %      Temp Source Heart Rate Source Patient Position - Orthostatic VS BP Location FiO2 (%)   Temporal Monitor Lying Right arm --      Pain Score       6           Vitals:    09/10/19 1323 09/10/19 1508 09/10/19 1553   BP: 146/91 141/85 151/89   Pulse: 92 77 83   Patient Position - Orthostatic VS: Lying Lying Sitting         Visual Acuity      ED Medications  Medications   ondansetron (ZOFRAN) injection 4 mg (4 mg Intravenous Given 9/10/19 1404)       Diagnostic Studies  Results Reviewed     Procedure Component Value Units Date/Time    Urine Microscopic [[id number]]  (Abnormal) Collected:  09/10/19 1506    Lab Status:  Final result Specimen:  Urine, Clean Catch Updated:  09/10/19 1538     RBC, UA 2-4 /hpf      WBC, UA 4-10 /hpf      Epithelial Cells Occasional /hpf      Bacteria, UA Occasional /hpf     POCT pregnancy, urine [[id number]]  (Normal) Resulted:  09/10/19 1508    Lab Status:  Final result Updated:  09/10/19 1509     EXT PREG TEST UR (Ref: Negative) negative     Control valid    POCT urinalysis dipstick [[id number]]  (Abnormal) Resulted:  09/10/19 1508    Lab Status:  Final result Specimen:  Urine Updated:  09/10/19 1508     Color, UA yellow     Clarity, UA clear    ED Urine Macroscopic [[id number]]  (Abnormal) Collected:  09/10/19 1506    Lab Status:  Final result Specimen:  Urine Updated:  09/10/19 1507     Color, UA Yellow     Clarity, UA Clear     pH, UA 5 5     Leukocytes, UA Trace     Nitrite, UA Negative     Protein, UA >=300 mg/dl      Glucose, UA Negative mg/dl      Ketones, UA Negative mg/dl      Urobilinogen, UA 0 2 E U /dl      Bilirubin, UA Negative     Blood, UA Trace     Specific Gravity, UA >=1 030    Narrative:       CLINITEK RESULT    Basic metabolic panel [[id number]]  (Abnormal) Collected:  09/10/19 1402    Lab Status:  Final result Specimen:  Blood from Arm, Right Updated:  09/10/19 1417     Sodium 141 mmol/L      Potassium 4 3 mmol/L      Chloride 107 mmol/L      CO2 24 mmol/L      ANION GAP 10 mmol/L      BUN 25 mg/dL      Creatinine 2 26 mg/dL      Glucose 157 mg/dL      Calcium 9 0 mg/dL      eGFR 25 ml/min/1 73sq m     Narrative:       [place] guidelines for Chronic Kidney Disease (CKD):     Stage 1 with normal or high GFR (GFR > 90 mL/min/1 73 square meters)    Stage 2 Mild CKD (GFR = 60-89 mL/min/1 73 square meters)    Stage 3A Moderate CKD (GFR = 45-59 mL/min/1 73 square meters)    Stage 3B Moderate CKD (GFR = 30-44 mL/min/1 73 square meters)    Stage 4 Severe CKD (GFR = 15-29 mL/min/1 73 square meters)    Stage 5 End Stage CKD (GFR <15 mL/min/1 73 square meters)  Note: GFR calculation is accurate only with a steady state creatinine    CBC and differential [[id number]] Collected:  09/10/19 1402    Lab Status:  Final result Specimen:  Blood from Arm, Right Updated:  09/10/19 1407     WBC 6 59 Thousand/uL      RBC 4 14 Million/uL      Hemoglobin 12 6 g/dL      Hematocrit 39 6 %      MCV 96 fL      MCH 30 4 pg      MCHC 31 8 g/dL      RDW 13 2 %      MPV 9 4 fL      Platelets 806 Thousands/uL      nRBC 0 /100 WBCs      Neutrophils Relative 73 %      Immat GRANS % 0 %      Lymphocytes Relative 18 %      Monocytes Relative 8 %      Eosinophils Relative 1 %      Basophils Relative 0 %      Neutrophils Absolute 4 83 Thousands/µL      Immature Grans Absolute 0 01 Thousand/uL      Lymphocytes Absolute 1 18 Thousands/µL      Monocytes Absolute 0 52 Thousand/µL      Eosinophils Absolute 0 03 Thousand/µL      Basophils Absolute 0 02 Thousands/µL                  No orders to display              Procedures  Procedures       ED Course  ED Course as of Sep 10 1615   Tue Sep 10, 2019   1506 Baseline 2 0 over the past several months   Creatinine(!): 2 26   1508 Will await micro   Leukocytes, UA(!): Trace   1545 With occasional bacteria and epithelial cells  Doubt acute UTI  WBC, UA(!): 4-10   1546 Pt updated on renal function, she does follow with Dr Lashell eCja  Will discharge home recommending increased PO hydration over the next few days  Will provide prescription for repeat BMP in one week, recommend follow up with Dr Lashell Ceja at that time  MDM    Disposition  Final diagnoses:   Fatigue     Time reflects when diagnosis was documented in both MDM as applicable and the Disposition within this note     Time User Action Codes Description Comment    9/10/2019  3:47 PM Julissa Garber Shayna [[id number]] Fatigue       ED Disposition     ED Disposition Condition Date/Time Comment    Discharge Stable Tue Sep 10, 2019  3:47 PM Cuba Lechuga discharge to home/self care  Follow-up Information     Follow up With Specialties Details Why Contact Info    Ally Henderson PA-C Family Medicine, Physician Assistant Schedule an appointment as soon as possible for a visit in 2 days For re-evaluation [address] [facility]  [address] [facility] [address]       Katia Asif,  Nephrology Schedule an appointment as soon as possible for a visit in 1 week For re-evaluation   [address] [facility]  [address] [facility] [address] [facility]            Discharge Medication List as of 9/10/2019  3:50 PM      CONTINUE these medications which have NOT CHANGED    Details   albuterol (VENTOLIN HFA) 90 mcg/act inhaler Inhale 2 puffs every 6 (six) hours as needed for wheezing, Starting Wed 12/12/2018, Normal      amLODIPine (NORVASC) 5 mg tablet Take 1 tablet (5 mg total) by mouth daily, Starting Tue 1/15/2019, Normal      amphetamine-dextroamphetamine (ADDERALL XR) 20 MG 24 hr capsule Take 1 capsule (20 mg total) by mouth 2 (two) times a dayMax Daily Amount: 40 mg, Starting Wed 8/21/2019, Normal      buPROPion (WELLBUTRIN XL) 150 mg 24 hr tablet bupropion HCl  mg 24 hr tablet, extended release, Historical Med      conjugated estrogens (PREMARIN) vaginal cream Insert 1 gram per vagina daily at bedtime x2 weeks, and then change to 1 gram twice weekly, Normal      cycloSPORINE (RESTASIS) 0 05 % ophthalmic emulsion Administer 1 drop to both eyes 2 (two) times a day, Historical Med      famotidine (PEPCID) 20 mg tablet Take 1 tablet (20 mg total) by mouth 2 (two) times a day, Starting Wed 7/10/2019, Normal      gabapentin (NEURONTIN) 400 mg capsule Starting Tue 7/23/2019, Historical Med      insulin aspart (NOVOLOG FLEXPEN) 100 Units/mL injection pen Inject 6 Units under the skin 3 (three) times a day with meals, Starting Tue 5/21/2019, Normal      lamoTRIgine (LaMICtal) 25 mg tablet lamotrigine 25 mg tablet, Historical Med      lidocaine (XYLOCAINE) 5 % ointment APPLY TWO GRAMS TWICE A DAY TOPICALLY AS NEEDED FOR MILD PAIN, Normal      metroNIDAZOLE (FLAGYL) 500 mg tablet Take 1 tablet (500 mg total) by mouth every 12 (twelve) hours for 7 days, Starting Mon 9/2/2019, Until Tue 9/10/2019, Print      ondansetron (ZOFRAN-ODT) 4 mg disintegrating tablet Take 1 tablet (4 mg total) by mouth every 8 (eight) hours as needed for nausea or vomiting, Starting Wed 6/12/2019, Normal      oxyCODONE-acetaminophen (PERCOCET) 7 5-325 MG per tablet Take 1 tablet by mouth every 6 (six) hours as needed (pain)Max Daily Amount: 4 tablets, Starting Wed 9/4/2019, Normal      Probiotic Product (PROBIOTIC-10 PO) Take 1 tablet by mouth daily, Historical Med      QUEtiapine (SEROquel) 100 mg tablet Take 1 tablet (100 mg total) by mouth daily at bedtime, Starting Tue 5/7/2019, Normal      TiZANidine (ZANAFLEX) 4 MG capsule Take 1 capsule (4 mg total) by mouth 3 (three) times a day, Starting Tue 5/21/2019, Normal      TOUJEO MAX SOLOSTAR 300 units/mL CONCETRATED U-300 injection pen INJECT 22 UNITS UNDER THE SKIN DAILY, Normal      Triamcinolone Acetonide 55 MCG/ACT AERO 1 Act (55 mcg total) into each nostril daily, Starting Tue 8/6/2019, Normal      Alcohol Swabs (ALCOHOL PADS) 70 % PADS by Does not apply route 4 (four) times a day, Starting Fri 6/7/2019, Normal      Blood Glucose Monitoring Suppl (FREESTYLE LITE) DEIRDRE by Does not apply route daily, Starting Tue 5/21/2019, Normal      dicyclomine (BENTYL) 20 mg tablet Take 1 tablet (20 mg total) by mouth every 6 (six) hours as needed (pain), Starting Sun 10/7/2018, Print      fluconazole (DIFLUCAN) 150 mg tablet fluconazole 150 mg tablet, Historical Med      FREESTYLE LITE test strip TEST THREE TIMES A DAY AS DIRECTED, Normal      Humidifiers (COOL MIST HUMIDIFIER 1 GALLON) MISC by Does not apply route as needed (shortness of breath), Starting Tue 2/27/2018, Print      Insulin Pen Needle (PEN NEEDLES) 31G X 8 MM MISC Inject 1 Stick under the skin 4 (four) times a day (with meals and at bedtime), Starting Wed 3/28/2018, Normal      Lancets (FREESTYLE) lancets by Other route 3 (three) times a day, Starting Wed 4/17/2019, Normal      MULTIPLE VITAMIN PO Take 1 capsule by mouth daily, Historical Med           Outpatient Discharge Orders   Basic metabolic panel   Standing Status: Future Standing Exp   Date: 09/10/20       ED Provider  Electronically Signed by           Kj Mora DO  09/10/19 8625

## 2019-09-10 NOTE — ED NOTES
Assumed care of patient at this time  Patient resting comfortably in bed with no distress noted  Family at patient bedside  Patient standing and pivot with a steady gait into wheel chair  ID band on and patient providing urine sample at this time        Lucy Carty RN  09/10/19 7418

## 2019-09-19 ENCOUNTER — OFFICE VISIT (OUTPATIENT)
Dept: OBGYN CLINIC | Facility: CLINIC | Age: 49
End: 2019-09-19
Payer: COMMERCIAL

## 2019-09-19 VITALS
DIASTOLIC BLOOD PRESSURE: 83 MMHG | HEART RATE: 79 BPM | SYSTOLIC BLOOD PRESSURE: 122 MMHG | HEIGHT: 62 IN | WEIGHT: 183 LBS | BODY MASS INDEX: 33.68 KG/M2

## 2019-09-19 DIAGNOSIS — M70.62 TROCHANTERIC BURSITIS OF LEFT HIP: ICD-10-CM

## 2019-09-19 DIAGNOSIS — S70.02XA CONTUSION OF LEFT HIP, INITIAL ENCOUNTER: Primary | ICD-10-CM

## 2019-09-19 PROCEDURE — 99203 OFFICE O/P NEW LOW 30 MIN: CPT | Performed by: ORTHOPAEDIC SURGERY

## 2019-09-19 PROCEDURE — 20610 DRAIN/INJ JOINT/BURSA W/O US: CPT | Performed by: ORTHOPAEDIC SURGERY

## 2019-09-19 RX ORDER — METHYLPREDNISOLONE ACETATE 40 MG/ML
2 INJECTION, SUSPENSION INTRA-ARTICULAR; INTRALESIONAL; INTRAMUSCULAR; SOFT TISSUE
Status: COMPLETED | OUTPATIENT
Start: 2019-09-19 | End: 2019-09-19

## 2019-09-19 RX ORDER — BUPIVACAINE HYDROCHLORIDE 2.5 MG/ML
1 INJECTION, SOLUTION EPIDURAL; INFILTRATION; INTRACAUDAL
Status: COMPLETED | OUTPATIENT
Start: 2019-09-19 | End: 2019-09-19

## 2019-09-19 RX ORDER — LIDOCAINE HYDROCHLORIDE 10 MG/ML
7 INJECTION, SOLUTION INFILTRATION; PERINEURAL
Status: COMPLETED | OUTPATIENT
Start: 2019-09-19 | End: 2019-09-19

## 2019-09-19 RX ADMIN — BUPIVACAINE HYDROCHLORIDE 1 ML: 2.5 INJECTION, SOLUTION EPIDURAL; INFILTRATION; INTRACAUDAL at 16:11

## 2019-09-19 RX ADMIN — LIDOCAINE HYDROCHLORIDE 7 ML: 10 INJECTION, SOLUTION INFILTRATION; PERINEURAL at 16:11

## 2019-09-19 RX ADMIN — METHYLPREDNISOLONE ACETATE 2 ML: 40 INJECTION, SUSPENSION INTRA-ARTICULAR; INTRALESIONAL; INTRAMUSCULAR; SOFT TISSUE at 16:11

## 2019-09-19 NOTE — LETTER
September 19, 2019     Lubna Wheeler PA-C  [address]  [address] [facility]  [address]  [id number]    Patient: Dafne Dao   YOB: 1970   Date of Visit: 9/19/2019       Dear Mr Bhargav Dalal: Thank you for referring Dafne Dao to me for evaluation  Below are my notes for this consultation  If you have questions, please do not hesitate to call me  I look forward to following your patient along with you  Sincerely,        Annamarie Sales MD        CC: No Recipients  Annamarie Sales MD  9/19/2019  4:13 PM  Sign at close encounter  Chief Complaint   Patient presents with    Left Hip - Pain           Assessment:  Contusion left hip with secondary trochanteric bursitis    Plan :  I explained the patient she bruised her hip has developed secondary bursitis/tendinitis on the side of the left hip  I injected the trigger area today with lidocaine, Marcaine, and Depo-Medrol to decrease her symptoms and she had immediate partial relief after the injection  She should put ice on the area in a small trash bag or bag of frozen peas for 15 minutes, 4 times a day, if needed for pain  She is already on Percocet from her other doctors and no other medicines are indicated  She may walk as much as her pain permits  She should try not to sleep on this side if she can for at least a week to see if this will help quiet this area down  I sent her back to her family physician for routine care and will see her back again if she has further problems    HPI:   This 59-year-old [place] female was sent by her family physician for consultation for left lateral hip pain  She has some underlying neurologic disease which she states is cataplexy and this is being treated by other physicians  This is made her have frequent falls as her legs go numb even when she is awake and she falls    She did fall most  recently on 08/21/2019 and went to the emergency room at [facility]   She had a CT scan which was negative for fracture and she was sent home  She gets around in a motorized scooter as she is unsteady walking  The pain especially hurts when she lays on this left side    PMHx:         Past Medical History:   Diagnosis Date    ADHD     Anemia of chronic disease     Anxiety     Asthma     Asthma     Borderline personality disorder (HCC)     Cataplexy     Chronic abdominal pain     CKD (chronic kidney disease) stage 3, GFR 30-59 ml/min (HCC)     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     [facility]     Diabetes mellitus ([facility] )     DVT (deep venous thrombosis) ([facility] )     History of acute pancreatitis     felt secondary to Bactrim    HTN (hypertension)     Hypertension     Liver disease     fatty liver    Microscopic polyangiitis (HCC)     Morbid obesity (HCC)     MPA (microscopic polyangiitis) (HCC)     Ovarian cyst     PTSD (post-traumatic stress disorder)     Renal disorder     Self-inflicted injury     self inflicted skin wounds    Wegener's granulomatosis with renal involvement ([place] [address] ) 2015       Past Surgical History:   Procedure Laterality Date    ESOPHAGOGASTRODUODENOSCOPY  09/11/2015    mild antral gastritis    [place] COLONOSCOPY FLX DX W/COLLJ SPEC WHEN PFRMD N/A 12/14/2018    Procedure: COLONOSCOPY with polypectomy;  Surgeon: Radha Lainez MD;  [facility]: [facility]; Service: Gastroenterology    W[place] ESOPHAGOGASTRODUODENOSCOPY TRANSORAL DIAGNOSTIC N/A 12/14/2018    Procedure: ESOPHAGOGASTRODUODENOSCOPY (EGD) with biopsy;  Surgeon: Radha Lainez MD;  [facility]: [facility];   Service: Gastroenterology    RELEASE SCAR CONTRACTURE / GRAFT REPAIRS OF HAND         Family History   Problem Relation Age of Onset    No Known Problems Mother     No Known Problems Father     Colon cancer Neg Hx     Drug abuse Neg Hx         mother father    Mental illness Neg Hx         disorder, mother father    Cancer Neg Hx     Breast cancer Neg Hx        Social History     Socioeconomic History    Marital status: Single     Spouse name: Not on file    Number of children: Not on file    Years of education: Not on file    Highest education level: Not on file   Occupational History    Occupation: disability   Social Needs    Financial resource strain: Not on file    Food insecurity:     Worry: Not on file     Inability: Not on file    Transportation needs:     Medical: Not on file     Non-medical: Not on file   Tobacco Use    Smoking status: Former Smoker     Types: Cigarettes     Last attempt to quit:      Years since quittin 7    Smokeless tobacco: Former User   Substance and Sexual Activity    Alcohol use: No     Frequency: Never    Drug use: Yes     Types: Marijuana     Comment: marijuana daily    Sexual activity: Not Currently     Partners: Female, Male     Birth control/protection: None   Lifestyle    Physical activity:     Days per week: Not on file     Minutes per session: Not on file    Stress: Not on file   Relationships    Social connections:     Talks on phone: Not on file     Gets together: Not on file     Attends Sikhism service: Not on file     Active member of club or organization: Not on file     Attends meetings of clubs or organizations: Not on file     Relationship status: Not on file    Intimate partner violence:     Fear of current or ex partner: Not on file     Emotionally abused: Not on file     Physically abused: Not on file     Forced sexual activity: Not on file   Other Topics Concern    Not on file   Social History Narrative    Social hx reviewed     No pref on Sikhism beliefs     Daily caffeine consumption 1 serving/day       Current Outpatient Medications   Medication Sig Dispense Refill    albuterol (VENTOLIN HFA) 90 mcg/act inhaler Inhale 2 puffs every 6 (six) hours as needed for wheezing 18 g 1    Alcohol Swabs (ALCOHOL PADS) 70 % PADS by Does not apply route 4 (four) times a day 400 each 3    amLODIPine (NORVASC) 5 mg tablet Take 1 tablet (5 mg total) by mouth daily 90 tablet 3    amphetamine-dextroamphetamine (ADDERALL XR) 20 MG 24 hr capsule Take 1 capsule (20 mg total) by mouth 2 (two) times a dayMax Daily Amount: 40 mg 60 capsule 0    Blood Glucose Monitoring Suppl (FREESTYLE LITE) DEIRDRE by Does not apply route daily 1 each 0    buPROPion (WELLBUTRIN XL) 150 mg 24 hr tablet bupropion HCl  mg 24 hr tablet, extended release      conjugated estrogens (PREMARIN) vaginal cream Insert 1 gram per vagina daily at bedtime x2 weeks, and then change to 1 gram twice weekly 30 g 2    cycloSPORINE (RESTASIS) 0 05 % ophthalmic emulsion Administer 1 drop to both eyes 2 (two) times a day      famotidine (PEPCID) 20 mg tablet Take 1 tablet (20 mg total) by mouth 2 (two) times a day 180 tablet 0    FREESTYLE LITE test strip TEST THREE TIMES A DAY AS DIRECTED 100 each 11    gabapentin (NEURONTIN) 400 mg capsule       Humidifiers (COOL MIST HUMIDIFIER 1 GALLON) MISC by Does not apply route as needed (shortness of breath) 1 each 0    insulin aspart (NOVOLOG FLEXPEN) 100 Units/mL injection pen Inject 6 Units under the skin 3 (three) times a day with meals 5 pen 3    Insulin Pen Needle (PEN NEEDLES) 31G X 8 MM MISC Inject 1 Stick under the skin 4 (four) times a day (with meals and at bedtime) 100 each 6    lamoTRIgine (LaMICtal) 25 mg tablet lamotrigine 25 mg tablet      Lancets (FREESTYLE) lancets by Other route 3 (three) times a day 100 each 5    lidocaine (XYLOCAINE) 5 % ointment APPLY TWO GRAMS TWICE A DAY TOPICALLY AS NEEDED FOR MILD PAIN 250 g 1    MULTIPLE VITAMIN PO Take 1 capsule by mouth daily      ondansetron (ZOFRAN-ODT) 4 mg disintegrating tablet Take 1 tablet (4 mg total) by mouth every 8 (eight) hours as needed for nausea or vomiting 30 tablet 0    oxyCODONE-acetaminophen (PERCOCET) 7 5-325 MG per tablet Take 1 tablet by mouth every 6 (six) hours as needed (pain)Max Daily Amount: 4 tablets 120 tablet 0    Probiotic Product (PROBIOTIC-10 PO) Take 1 tablet by mouth daily      QUEtiapine (SEROquel) 100 mg tablet Take 1 tablet (100 mg total) by mouth daily at bedtime 90 tablet 1    TiZANidine (ZANAFLEX) 4 MG capsule Take 1 capsule (4 mg total) by mouth 3 (three) times a day 90 capsule 2    TOUJEO MAX SOLOSTAR 300 units/mL CONCETRATED U-300 injection pen INJECT 22 UNITS UNDER THE SKIN DAILY 6 pen 0    Triamcinolone Acetonide 55 MCG/ACT AERO 1 Act (55 mcg total) into each nostril daily 1 Bottle 5    dicyclomine (BENTYL) 20 mg tablet Take 1 tablet (20 mg total) by mouth every 6 (six) hours as needed (pain) (Patient not taking: Reported on 9/10/2019) 10 tablet 0    fluconazole (DIFLUCAN) 150 mg tablet fluconazole 150 mg tablet      gabapentin (NEURONTIN) 400 mg capsule Take 1 capsule (400 mg total) by mouth 3 (three) times a day for 30 days 90 capsule 3     No current facility-administered medications for this visit  Allergies: Prozac [fluoxetine hcl]; Bactrim [sulfamethoxazole-trimethoprim]; Lithium; Amlodipine; Haldol [haloperidol]; Ibuprofen; Lexapro [escitalopram oxalate]; Navane [thiothixene]; and Other    ROS:  Positive for gait disturbance, GERD, irritable bowel syndrome, blood sugar issues, wheezing, DVT, and orthopedic complaints as above  The remaining 5/12 systems on the intake sheet that I reviewed were negative  PE:  /83   Pulse 79   Ht 5' 2" (1 575 m)   Wt 83 kg (183 lb)   LMP  (LMP Unknown)   BMI 33 47 kg/m²    Constitutional: The patient was  oriented to person, place, and time  She was mildly heavy   In mild distress  HEENT: Vision intact  Hearing normal  Swallowing normal   Head: Normocephalic  Cardiovascular: Intact distal pulses  Pulse regular  Pulmonary/Chest: Effort normal  No respiratory distress  No wheezing  Neurological: Alert and oriented to person, place, and time  Skin: Skin is warm  Some discoloration of the skin noted on arms  Psychiatric: Normal mood and affect       Ortho Exam:  She came in a motorized scooter  She had tenderness over his left lateral thigh over the greater trochanteric area  There are no overlying skin changes of redness or ecchymosis  She had fair passive hip motion with pain on rotation and resisted abduction  She had no knee effusion  She had good motion and capillary refill  in her toes and sensation to light touch appeared intact  She had no popliteal adenopathy    Studies reviewed:  I personally reviewed the CT scan of her left hip as well as the radiologist's report  There is no fracture, dislocation, or collapse of the hip joint seen  I do not see any calcification adjacent to the greater tuberosity      Large joint arthrocentesis: L greater trochanteric bursa  Date/Time: 9/19/2019 4:11 PM  Consent given by: patient  Site marked: site marked  Supporting Documentation  Indications: pain   Procedure Details  Location: hip - L greater trochanteric bursa  Preparation: Patient was prepped and draped in the usual sterile fashion  Needle size: 18 G  Ultrasound guidance: no  Approach: lateral  Medications administered: 1 mL bupivacaine (PF) 0 25 %; 7 mL lidocaine 1 %; 2 mL methylPREDNISolone acetate 40 mg/mL    Patient tolerance: patient tolerated the procedure well with no immediate complications  Dressing:  Sterile dressing applied

## 2019-09-19 NOTE — PATIENT INSTRUCTIONS
I explained the patient she bruised her hip has developed secondary bursitis/tendinitis on the side of the left hip  I injected the trigger area today with lidocaine, Marcaine, and Depo-Medrol to decrease her symptoms and she had immediate partial relief after the injection  She should put ice on the area in a small trash bag or bag of frozen peas for 15 minutes, 4 times a day, if needed for pain  She is already on Percocet from her other doctors and no other medicines are indicated  She may walk as much as her pain permits  She should try not to sleep on this side if she can for at least a week to see if this will help quiet this area down    I sent her back to her family physician for routine care and will see her back again if she has further problems

## 2019-09-19 NOTE — PROGRESS NOTES
Chief Complaint   Patient presents with    Left Hip - Pain           Assessment:  Contusion left hip with secondary trochanteric bursitis    Plan :  I explained the patient she bruised her hip has developed secondary bursitis/tendinitis on the side of the left hip  I injected the trigger area today with lidocaine, Marcaine, and Depo-Medrol to decrease her symptoms and she had immediate partial relief after the injection  She should put ice on the area in a small trash bag or bag of frozen peas for 15 minutes, 4 times a day, if needed for pain  She is already on Percocet from her other doctors and no other medicines are indicated  She may walk as much as her pain permits  She should try not to sleep on this side if she can for at least a week to see if this will help quiet this area down  I sent her back to her family physician for routine care and will see her back again if she has further problems    HPI:   This 45-year-old [place] female was sent by her family physician for consultation for left lateral hip pain  She has some underlying neurologic disease which she states is cataplexy and this is being treated by other physicians  This is made her have frequent falls as her legs go numb even when she is awake and she falls  She did fall most  recently on 08/21/2019 and went to the emergency room at [facility]   She had a CT scan which was negative for fracture and she was sent home  She gets around in a motorized scooter as she is unsteady walking    The pain especially hurts when she lays on this left side    PMHx:         Past Medical History:   Diagnosis Date    ADHD     Anemia of chronic disease     Anxiety     Asthma     Asthma     Borderline personality disorder ([facility])     Cataplexy     Chronic abdominal pain     CKD (chronic kidney disease) stage 3, GFR 30-59 ml/min ([facility])     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[facility] )     Cushing syndrome ([facility] )     Diabetes mellitus ([facility] )     DVT (deep venous thrombosis) ([facility] )     History of acute pancreatitis     felt secondary to Bactrim    HTN (hypertension)     Hypertension     Liver disease     fatty liver    Microscopic polyangiitis (HCC)     Morbid obesity (HCC)     MPA (microscopic polyangiitis) (HCC)     Ovarian cyst     PTSD (post-traumatic stress disorder)     Renal disorder     Self-inflicted injury     self inflicted skin wounds    Wegener's granulomatosis with renal involvement ([facility] )        Past Surgical History:   Procedure Laterality Date    ESOPHAGOGASTRODUODENOSCOPY  2015    mild antral gastritis    ME COLONOSCOPY FLX DX W/COLLJ SPEC WHEN PFRMD N/A 2018    Procedure: COLONOSCOPY with polypectomy;  Surgeon: Karen Dixon MD;  Location: [facility]; Service: Gastroenterology    ME ESOPHAGOGASTRODUODENOSCOPY TRANSORAL DIAGNOSTIC N/A 2018    Procedure: ESOPHAGOGASTRODUODENOSCOPY (EGD) with biopsy;  Surgeon: Karen Dixon MD;  [facility];   Service: Gastroenterology    RELEASE SCAR CONTRACTURE / GRAFT REPAIRS OF HAND         Family History   Problem Relation Age of Onset    No Known Problems Mother     No Known Problems Father     Colon cancer Neg Hx     Drug abuse Neg Hx         mother father    Mental illness Neg Hx         disorder, mother father    Cancer Neg Hx     Breast cancer Neg Hx        Social History     Socioeconomic History    Marital status: Single     Spouse name: Not on file    Number of children: Not on file    Years of education: Not on file    Highest education level: Not on file   Occupational History    Occupation: disability   Social Needs    Financial resource strain: Not on file    Food insecurity:     Worry: Not on file     Inability: Not on file    Transportation needs:     Medical: Not on file     Non-medical: Not on file   Tobacco Use    Smoking status: Former Smoker     Types: Cigarettes     Last attempt to quit:      Years since quittin 7    Smokeless tobacco: Former User   Substance and Sexual Activity    Alcohol use: No     Frequency: Never    Drug use: Yes     Types: Marijuana     Comment: marijuana daily    Sexual activity: Not Currently     Partners: Female, Male     Birth control/protection: None   Lifestyle    Physical activity:     Days per week: Not on file     Minutes per session: Not on file    Stress: Not on file   Relationships    Social connections:     Talks on phone: Not on file     Gets together: Not on file     Attends Church service: Not on file     Active member of club or organization: Not on file     Attends meetings of clubs or organizations: Not on file     Relationship status: Not on file    Intimate partner violence:     Fear of current or ex partner: Not on file     Emotionally abused: Not on file     Physically abused: Not on file     Forced sexual activity: Not on file   Other Topics Concern    Not on file   Social History Narrative    Social hx reviewed     No pref on Church beliefs     Daily caffeine consumption 1 serving/day       Current Outpatient Medications   Medication Sig Dispense Refill    albuterol (VENTOLIN HFA) 90 mcg/act inhaler Inhale 2 puffs every 6 (six) hours as needed for wheezing 18 g 1    Alcohol Swabs (ALCOHOL PADS) 70 % PADS by Does not apply route 4 (four) times a day 400 each 3    amLODIPine (NORVASC) 5 mg tablet Take 1 tablet (5 mg total) by mouth daily 90 tablet 3    amphetamine-dextroamphetamine (ADDERALL XR) 20 MG 24 hr capsule Take 1 capsule (20 mg total) by mouth 2 (two) times a dayMax Daily Amount: 40 mg 60 capsule 0    Blood Glucose Monitoring Suppl (FREESTYLE LITE) DEIRDRE by Does not apply route daily 1 each 0    buPROPion (WELLBUTRIN XL) 150 mg 24 hr tablet bupropion HCl  mg 24 hr tablet, extended release      conjugated estrogens (PREMARIN) vaginal cream Insert 1 gram per vagina daily at bedtime x2 weeks, and then change to 1 gram twice weekly 30 g 2    cycloSPORINE (RESTASIS) 0 05 % ophthalmic emulsion Administer 1 drop to both eyes 2 (two) times a day      famotidine (PEPCID) 20 mg tablet Take 1 tablet (20 mg total) by mouth 2 (two) times a day 180 tablet 0    FREESTYLE LITE test strip TEST THREE TIMES A DAY AS DIRECTED 100 each 11    gabapentin (NEURONTIN) 400 mg capsule       Humidifiers (COOL MIST HUMIDIFIER 1 GALLON) MISC by Does not apply route as needed (shortness of breath) 1 each 0    insulin aspart (NOVOLOG FLEXPEN) 100 Units/mL injection pen Inject 6 Units under the skin 3 (three) times a day with meals 5 pen 3    Insulin Pen Needle (PEN NEEDLES) 31G X 8 MM MISC Inject 1 Stick under the skin 4 (four) times a day (with meals and at bedtime) 100 each 6    lamoTRIgine (LaMICtal) 25 mg tablet lamotrigine 25 mg tablet      Lancets (FREESTYLE) lancets by Other route 3 (three) times a day 100 each 5    lidocaine (XYLOCAINE) 5 % ointment APPLY TWO GRAMS TWICE A DAY TOPICALLY AS NEEDED FOR MILD PAIN 250 g 1    MULTIPLE VITAMIN PO Take 1 capsule by mouth daily      ondansetron (ZOFRAN-ODT) 4 mg disintegrating tablet Take 1 tablet (4 mg total) by mouth every 8 (eight) hours as needed for nausea or vomiting 30 tablet 0    oxyCODONE-acetaminophen (PERCOCET) 7 5-325 MG per tablet Take 1 tablet by mouth every 6 (six) hours as needed (pain)Max Daily Amount: 4 tablets 120 tablet 0    Probiotic Product (PROBIOTIC-10 PO) Take 1 tablet by mouth daily      QUEtiapine (SEROquel) 100 mg tablet Take 1 tablet (100 mg total) by mouth daily at bedtime 90 tablet 1    TiZANidine (ZANAFLEX) 4 MG capsule Take 1 capsule (4 mg total) by mouth 3 (three) times a day 90 capsule 2    TOUJEO MAX SOLOSTAR 300 units/mL CONCETRATED U-300 injection pen INJECT 22 UNITS UNDER THE SKIN DAILY 6 pen 0    Triamcinolone Acetonide 55 MCG/ACT AERO 1 Act (55 mcg total) into each nostril daily 1 Bottle 5    dicyclomine (BENTYL) 20 mg tablet Take 1 tablet (20 mg total) by mouth every 6 (six) hours as needed (pain) (Patient not taking: Reported on 9/10/2019) 10 tablet 0    fluconazole (DIFLUCAN) 150 mg tablet fluconazole 150 mg tablet      gabapentin (NEURONTIN) 400 mg capsule Take 1 capsule (400 mg total) by mouth 3 (three) times a day for 30 days 90 capsule 3     No current facility-administered medications for this visit  Allergies: Prozac [fluoxetine hcl]; Bactrim [sulfamethoxazole-trimethoprim]; Lithium; Amlodipine; Haldol [haloperidol]; Ibuprofen; Lexapro [escitalopram oxalate]; Navane [thiothixene]; and Other    ROS:  Positive for gait disturbance, GERD, irritable bowel syndrome, blood sugar issues, wheezing, DVT, and orthopedic complaints as above  The remaining 5/12 systems on the intake sheet that I reviewed were negative  PE:  /83   Pulse 79   Ht 5' 2" (1 575 m)   Wt 83 kg (183 lb)   LMP  (LMP Unknown)   BMI 33 47 kg/m²   Constitutional: The patient was  oriented to person, place, and time  She was mildly heavy   In mild distress  HEENT: Vision intact  Hearing normal  Swallowing normal   Head: Normocephalic  Cardiovascular: Intact distal pulses  Pulse regular  Pulmonary/Chest: Effort normal  No respiratory distress  No wheezing  Neurological: Alert and oriented to person, place, and time  Skin: Skin is warm  Some discoloration of the skin noted on arms  Psychiatric: Normal mood and affect  Ortho Exam:  She came in a motorized scooter  She had tenderness over his left lateral thigh over the greater trochanteric area  There are no overlying skin changes of redness or ecchymosis  She had fair passive hip motion with pain on rotation and resisted abduction  She had no knee effusion  She had good motion and capillary refill  in her toes and sensation to light touch appeared intact  She had no popliteal adenopathy    Studies reviewed:  I personally reviewed the CT scan of her left hip as well as the radiologist's report    There is no fracture, dislocation, or collapse of the hip joint seen  I do not see any calcification adjacent to the greater tuberosity      Large joint arthrocentesis: L greater trochanteric bursa  Date/Time: 9/19/2019 4:11 PM  Consent given by: patient  Site marked: site marked  Supporting Documentation  Indications: pain   Procedure Details  Location: hip - L greater trochanteric bursa  Preparation: Patient was prepped and draped in the usual sterile fashion  Needle size: 18 G  Ultrasound guidance: no  Approach: lateral  Medications administered: 1 mL bupivacaine (PF) 0 25 %; 7 mL lidocaine 1 %; 2 mL methylPREDNISolone acetate 40 mg/mL    Patient tolerance: patient tolerated the procedure well with no immediate complications  Dressing:  Sterile dressing applied

## 2019-09-23 DIAGNOSIS — J30.1 SEASONAL ALLERGIC RHINITIS DUE TO POLLEN: ICD-10-CM

## 2019-09-23 DIAGNOSIS — M54.41 CHRONIC LOW BACK PAIN WITH BILATERAL SCIATICA, UNSPECIFIED BACK PAIN LATERALITY: ICD-10-CM

## 2019-09-23 DIAGNOSIS — F31.9 BIPOLAR 1 DISORDER (HCC): ICD-10-CM

## 2019-09-23 DIAGNOSIS — G89.29 CHRONIC LOW BACK PAIN WITH BILATERAL SCIATICA, UNSPECIFIED BACK PAIN LATERALITY: ICD-10-CM

## 2019-09-23 DIAGNOSIS — M54.42 CHRONIC LOW BACK PAIN WITH BILATERAL SCIATICA, UNSPECIFIED BACK PAIN LATERALITY: ICD-10-CM

## 2019-09-24 ENCOUNTER — TELEPHONE (OUTPATIENT)
Dept: FAMILY MEDICINE CLINIC | Facility: CLINIC | Age: 49
End: 2019-09-24

## 2019-09-24 RX ORDER — MONTELUKAST SODIUM 10 MG/1
TABLET ORAL
Qty: 90 TABLET | Refills: 3 | Status: SHIPPED | OUTPATIENT
Start: 2019-09-24 | End: 2019-12-26 | Stop reason: ALTCHOICE

## 2019-09-24 RX ORDER — TIZANIDINE HYDROCHLORIDE 4 MG/1
CAPSULE, GELATIN COATED ORAL
Qty: 90 CAPSULE | Refills: 2 | Status: SHIPPED | OUTPATIENT
Start: 2019-09-24 | End: 2020-01-02

## 2019-09-24 RX ORDER — DEXTROAMPHETAMINE SACCHARATE, AMPHETAMINE ASPARTATE MONOHYDRATE, DEXTROAMPHETAMINE SULFATE AND AMPHETAMINE SULFATE 5; 5; 5; 5 MG/1; MG/1; MG/1; MG/1
20 CAPSULE, EXTENDED RELEASE ORAL 2 TIMES DAILY
Qty: 60 CAPSULE | Refills: 0 | Status: SHIPPED | OUTPATIENT
Start: 2019-09-24 | End: 2019-10-25 | Stop reason: SDUPTHER

## 2019-09-24 NOTE — TELEPHONE ENCOUNTER
Request for new scooter has been denied  Member has [address] [facility] [address] wheel chair authorized 10/24/18 and medicare only allows for one mobility device  Repairs exceed cost of scooter, scooter is only two years and one month old  Life expectancy is 5 years  Medicare only pays up to the cost of the scooter for repairs  If you would like to do peer to peer review here is the information        Peer to peer  Review Dr Rohan Gregory [phone]  Good until 9/26 by 4pm

## 2019-09-25 ENCOUNTER — TELEPHONE (OUTPATIENT)
Dept: OBGYN CLINIC | Facility: CLINIC | Age: 49
End: 2019-09-25

## 2019-09-25 ENCOUNTER — OFFICE VISIT (OUTPATIENT)
Dept: NEPHROLOGY | Facility: CLINIC | Age: 49
End: 2019-09-25
Payer: COMMERCIAL

## 2019-09-25 VITALS — DIASTOLIC BLOOD PRESSURE: 88 MMHG | SYSTOLIC BLOOD PRESSURE: 122 MMHG | RESPIRATION RATE: 16 BRPM | HEART RATE: 70 BPM

## 2019-09-25 DIAGNOSIS — N18.4 CKD (CHRONIC KIDNEY DISEASE) STAGE 4, GFR 15-29 ML/MIN (HCC): Primary | ICD-10-CM

## 2019-09-25 DIAGNOSIS — M31.7 MPA (MICROSCOPIC POLYANGIITIS) (HCC): Chronic | ICD-10-CM

## 2019-09-25 DIAGNOSIS — N18.4 TYPE 2 DIABETES MELLITUS WITH STAGE 4 CHRONIC KIDNEY DISEASE, UNSPECIFIED WHETHER LONG TERM INSULIN USE (HCC): ICD-10-CM

## 2019-09-25 DIAGNOSIS — E11.22 TYPE 2 DIABETES MELLITUS WITH STAGE 4 CHRONIC KIDNEY DISEASE, UNSPECIFIED WHETHER LONG TERM INSULIN USE (HCC): ICD-10-CM

## 2019-09-25 PROCEDURE — 99214 OFFICE O/P EST MOD 30 MIN: CPT | Performed by: INTERNAL MEDICINE

## 2019-09-25 NOTE — LETTER
September 25, 2019     Naomy Nelson PA-C  [address]  [address] [facility]  [place] [address] [facility]    Patient: Kj Ugarte   YOB: 1970   Date of Visit: 9/25/2019       Dear Dr Adama Stokes: Thank you for referring Kj Ugarte to me for evaluation  Below are the relevant portions of my assessment and plan of care  If you have questions, please do not hesitate to call me  I look forward to following Brenda Cabrera along with you  Sincerely,        Helen Oquendo,         CC: No Recipients                     ASSESSMENT and PLAN:  1  Chronic kidney disease, stage IV, baseline creatinine approximately 2 0 most recent creatinine 2 2 with an estimated GFR 25  2  History of MPO associated vasculitis with anti-GBM positivity, historically was on plasmapheresis, prednisone as well as Cytoxan  3  Hypertension, appears well controlled, continue with amlodipine dose  4  History of diabetes, last hemoglobin A1c 6 3    · Overall renal function appears fairly stable, fluctuant in the low 2 range    · Blood pressure under good control  · Avoid ACE-inhibitor or angiotensin receptor blocker at this time  · Avoid NSAIDs  · Follow-up in 6 months with repeat laboratory studies at that time including repeat ANCA  · Encourage increasing fluid intake

## 2019-09-25 NOTE — PROGRESS NOTES
NEPHROLOGY OUTPATIENT PROGRESS NOTE   Sherrie Serrano 50 y o  female MRN: [id number]  Reason for visit:  Chronic kidney disease    ASSESSMENT and PLAN:  1  Chronic kidney disease, stage IV, baseline creatinine approximately 2 0 most recent creatinine 2 2 with an estimated GFR 25  2  History of MPO associated vasculitis with anti-GBM positivity, historically was on plasmapheresis, prednisone as well as Cytoxan  3  Hypertension, appears well controlled, continue with amlodipine dose  4  History of diabetes, last hemoglobin A1c 6 3    · Overall renal function appears fairly stable, fluctuant in the low 2 range  · Blood pressure under good control  · Avoid ACE-inhibitor or angiotensin receptor blocker at this time  · Avoid NSAIDs  · Follow-up in 6 months with repeat laboratory studies at that time including repeat ANCA  · Encourage increasing fluid intake    SUBJECTIVE / INTERVAL HISTORY:  She has been doing reasonably well  She briefly did have a ER visit secondary to fatigue  However felt that this was likely due to an oncoming cold  Since then she has been doing better fatigue has improved  Currently denies any chest pain shortness of breath  Appetite is stable without complaints of nausea vomiting diarrhea  States that she only drinks approximately 1 bottle of water today, have encouraged increase in fluid intake  Review of Systems      OBJECTIVE:  /88 (BP Location: Right arm, Patient Position: Sitting, Cuff Size: Standard)   Pulse 70   Resp 16   LMP  (LMP Unknown)   Vitals:       Physical Exam   Constitutional: She is oriented to person, place, and time  No distress  HENT:   Head: Normocephalic  Neck: Neck supple  Cardiovascular: Normal rate and regular rhythm  Pulmonary/Chest: Effort normal and breath sounds normal    Abdominal: Soft  Musculoskeletal: She exhibits no edema  Neurological: She is alert and oriented to person, place, and time  Skin: Skin is warm and dry   No rash noted          Medications:    Current Outpatient Medications:     albuterol (VENTOLIN HFA) 90 mcg/act inhaler, Inhale 2 puffs every 6 (six) hours as needed for wheezing, Disp: 18 g, Rfl: 1    Alcohol Swabs (ALCOHOL PADS) 70 % PADS, by Does not apply route 4 (four) times a day, Disp: 400 each, Rfl: 3    amLODIPine (NORVASC) 5 mg tablet, Take 1 tablet (5 mg total) by mouth daily, Disp: 90 tablet, Rfl: 3    amphetamine-dextroamphetamine (ADDERALL XR) 20 MG 24 hr capsule, Take 1 capsule (20 mg total) by mouth 2 (two) times a dayMax Daily Amount: 40 mg, Disp: 60 capsule, Rfl: 0    Blood Glucose Monitoring Suppl (FREESTYLE LITE) DEIRDRE, by Does not apply route daily, Disp: 1 each, Rfl: 0    buPROPion (WELLBUTRIN XL) 150 mg 24 hr tablet, bupropion HCl  mg 24 hr tablet, extended release, Disp: , Rfl:     conjugated estrogens (PREMARIN) vaginal cream, Insert 1 gram per vagina daily at bedtime x2 weeks, and then change to 1 gram twice weekly, Disp: 30 g, Rfl: 2    cycloSPORINE (RESTASIS) 0 05 % ophthalmic emulsion, Administer 1 drop to both eyes 2 (two) times a day, Disp: , Rfl:     famotidine (PEPCID) 20 mg tablet, Take 1 tablet (20 mg total) by mouth 2 (two) times a day, Disp: 180 tablet, Rfl: 0    fluconazole (DIFLUCAN) 150 mg tablet, fluconazole 150 mg tablet, Disp: , Rfl:     FREESTYLE LITE test strip, TEST THREE TIMES A DAY AS DIRECTED, Disp: 100 each, Rfl: 11    gabapentin (NEURONTIN) 400 mg capsule, Take 1 capsule (400 mg total) by mouth 3 (three) times a day for 30 days, Disp: 90 capsule, Rfl: 3    gabapentin (NEURONTIN) 400 mg capsule, , Disp: , Rfl:     Humidifiers (COOL MIST HUMIDIFIER 1 GALLON) MISC, by Does not apply route as needed (shortness of breath), Disp: 1 each, Rfl: 0    insulin aspart (NOVOLOG FLEXPEN) 100 Units/mL injection pen, Inject 6 Units under the skin 3 (three) times a day with meals, Disp: 5 pen, Rfl: 3    Insulin Pen Needle (PEN NEEDLES) 31G X 8 MM MISC, Inject 1 Stick under the skin 4 (four) times a day (with meals and at bedtime), Disp: 100 each, Rfl: 6    lamoTRIgine (LaMICtal) 25 mg tablet, lamotrigine 25 mg tablet, Disp: , Rfl:     Lancets (FREESTYLE) lancets, by Other route 3 (three) times a day, Disp: 100 each, Rfl: 5    lidocaine (XYLOCAINE) 5 % ointment, APPLY TWO GRAMS TWICE A DAY TOPICALLY AS NEEDED FOR MILD PAIN, Disp: 250 g, Rfl: 1    montelukast (SINGULAIR) 10 mg tablet, TAKE ONE TABLET BY MOUTH ONCE DAILY AT BEDTIME, Disp: 90 tablet, Rfl: 3    MULTIPLE VITAMIN PO, Take 1 capsule by mouth daily, Disp: , Rfl:     ondansetron (ZOFRAN-ODT) 4 mg disintegrating tablet, Take 1 tablet (4 mg total) by mouth every 8 (eight) hours as needed for nausea or vomiting, Disp: 30 tablet, Rfl: 0    oxyCODONE-acetaminophen (PERCOCET) 7 5-325 MG per tablet, Take 1 tablet by mouth every 6 (six) hours as needed (pain)Max Daily Amount: 4 tablets, Disp: 120 tablet, Rfl: 0    Probiotic Product (PROBIOTIC-10 PO), Take 1 tablet by mouth daily, Disp: , Rfl:     QUEtiapine (SEROquel) 100 mg tablet, Take 1 tablet (100 mg total) by mouth daily at bedtime, Disp: 90 tablet, Rfl: 1    TiZANidine (ZANAFLEX) 4 MG capsule, TAKE 1 CAPSULE (4 MG TOTAL) BY MOUTH THREE (THREE) TIMES A DAY, Disp: 90 capsule, Rfl: 2    TOUJEO MAX SOLOSTAR 300 units/mL CONCETRATED U-300 injection pen, INJECT 22 UNITS UNDER THE SKIN DAILY, Disp: 6 pen, Rfl: 0    Triamcinolone Acetonide 55 MCG/ACT AERO, 1 Act (55 mcg total) into each nostril daily, Disp: 1 Bottle, Rfl: 5    dicyclomine (BENTYL) 20 mg tablet, Take 1 tablet (20 mg total) by mouth every 6 (six) hours as needed (pain) (Patient not taking: Reported on 9/10/2019), Disp: 10 tablet, Rfl: 0    Laboratory Results:  Results for orders placed or performed during the hospital encounter of 09/10/19   CBC and differential   Result Value Ref Range    WBC 6 59 4 31 - 10 16 Thousand/uL    RBC 4 14 3 81 - 5 12 Million/uL    Hemoglobin 12 6 11 5 - 15 4 g/dL Hematocrit 39 6 34 8 - 46 1 %    MCV 96 82 - 98 fL    MCH 30 4 26 8 - 34 3 pg    MCHC 31 8 31 4 - 37 4 g/dL    RDW 13 2 11 6 - 15 1 %    MPV 9 4 8 9 - 12 7 fL    Platelets 185 807 - 819 Thousands/uL    nRBC 0 /100 WBCs    Neutrophils Relative 73 43 - 75 %    Immat GRANS % 0 0 - 2 %    Lymphocytes Relative 18 14 - 44 %    Monocytes Relative 8 4 - 12 %    Eosinophils Relative 1 0 - 6 %    Basophils Relative 0 0 - 1 %    Neutrophils Absolute 4 83 1 85 - 7 62 Thousands/µL    Immature Grans Absolute 0 01 0 00 - 0 20 Thousand/uL    Lymphocytes Absolute 1 18 0 60 - 4 47 Thousands/µL    Monocytes Absolute 0 52 0 17 - 1 22 Thousand/µL    Eosinophils Absolute 0 03 0 00 - 0 61 Thousand/µL    Basophils Absolute 0 02 0 00 - 0 10 Thousands/µL   Basic metabolic panel   Result Value Ref Range    Sodium 141 136 - 145 mmol/L    Potassium 4 3 3 5 - 5 3 mmol/L    Chloride 107 100 - 108 mmol/L    CO2 24 21 - 32 mmol/L    ANION GAP 10 4 - 13 mmol/L    BUN 25 5 - 25 mg/dL    Creatinine 2 26 (H) 0 60 - 1 30 mg/dL    Glucose 157 (H) 65 - 140 mg/dL    Calcium 9 0 8 3 - 10 1 mg/dL    eGFR 25 ml/min/1 73sq m   Urine Microscopic   Result Value Ref Range    RBC, UA 2-4 (A) None Seen, 0-5 /hpf    WBC, UA 4-10 (A) None Seen, 0-5, 5-55, 5-65 /hpf    Epithelial Cells Occasional None Seen, Occasional /hpf    Bacteria, UA Occasional None Seen, Occasional /hpf   POCT urinalysis dipstick   Result Value Ref Range    Color, UA yellow     Clarity, UA clear    POCT pregnancy, urine   Result Value Ref Range    EXT PREG TEST UR (Ref: Negative) negative     Control valid    ED Urine Macroscopic   Result Value Ref Range    Color, UA Yellow     Clarity, UA Clear     pH, UA 5 5 4 5 - 8 0    Leukocytes, UA Trace (A) Negative    Nitrite, UA Negative Negative    Protein, UA >=300 (A) Negative mg/dl    Glucose, UA Negative Negative mg/dl    Ketones, UA Negative Negative mg/dl    Urobilinogen, UA 0 2 0 2, 1 0 E U /dl E U /dl    Bilirubin, UA Negative Negative Blood, UA Trace (A) Negative    Specific Gravity, UA >=1 030 1 003 - 1 030

## 2019-09-26 NOTE — TELEPHONE ENCOUNTER
Rec'd call back from patient  She reports that she is unhappy with Premarin vaginal cream because it is too messy  She reports that she used it for 4 days, but discontinued then because she had to wear marcelino pads all the time because it was too messy  She is requesting a different method of administration  However, she is not a good candidate for oral administration due to extensive medical problems and medications  Advised her she could try Replens vaginal moisturizer  She verbalizes understanding

## 2019-09-26 NOTE — TELEPHONE ENCOUNTER
Did a peer to peer, and authorization was denied as it appears she has a motorized wheelchair that was given to her last year

## 2019-10-02 DIAGNOSIS — G89.4 CHRONIC PAIN SYNDROME: ICD-10-CM

## 2019-10-02 DIAGNOSIS — M31.30 WEGENER'S GRANULOMATOSIS: Chronic | ICD-10-CM

## 2019-10-02 RX ORDER — OXYCODONE AND ACETAMINOPHEN 7.5; 325 MG/1; MG/1
1 TABLET ORAL EVERY 6 HOURS PRN
Qty: 120 TABLET | Refills: 0 | Status: SHIPPED | OUTPATIENT
Start: 2019-10-02 | End: 2019-10-29 | Stop reason: SDUPTHER

## 2019-10-03 ENCOUNTER — TELEPHONE (OUTPATIENT)
Dept: FAMILY MEDICINE CLINIC | Facility: CLINIC | Age: 49
End: 2019-10-03

## 2019-10-07 ENCOUNTER — TELEPHONE (OUTPATIENT)
Dept: FAMILY MEDICINE CLINIC | Facility: CLINIC | Age: 49
End: 2019-10-07

## 2019-10-07 DIAGNOSIS — IMO0001 IDDM (INSULIN DEPENDENT DIABETES MELLITUS): Chronic | ICD-10-CM

## 2019-10-07 NOTE — TELEPHONE ENCOUNTER
Pt LM stating there is lack of communication and she needs as an appt asap regarding her request for a mobility device  Pt stated in msg that insurance is recommending to put her in a new mobility device and she needs an appt with PCP to be evaluated but appt was cancelled and she was told it is not necessary but it is necessary for her to be seen  Contacted pt to assist with her request   Pt requested I disregard the msg because appt has been scheduled and everything's been taken care of

## 2019-10-08 RX ORDER — BLOOD-GLUCOSE METER
EACH MISCELLANEOUS
Qty: 100 EACH | Refills: 4 | Status: SHIPPED | OUTPATIENT
Start: 2019-10-08 | End: 2020-02-25

## 2019-10-14 NOTE — PROGRESS NOTES
Patient ID: Eloisa Cox is a 50 y o  female  Assessment/Plan:  Weakness of both lower extremities/neuropathy  --Patient noted with mild acral sensory loss, likely secondary to underlying small fiber neuropathy which was supported by abnormal skin biopsy  Etiology most likely secondary to diabetes, with a contributing factor from her CKD, as well as some psychological factors, hip pain  [facility] [place] --EMG in the past without evidence of large fiber neuropathy or radiculopathy  --MRI of the lower back with only minimal degenerative disc disease, no significant neural foraminal narrowing or canal stenosis  --on gabapentin 800 HS,  at this time, will attempt to take 400 milligrams in the a m  Side effects were reviewed, she is to call the office in 1 week with an update  --will decrease her Zanaflex to 1 tablet twice a day  --labs unrevealing for any significant reversible cause  --patient to have PT  --encouraged good blood sugar control, hemoglobin A1c 6 3  --fall precautions  --her lower extremity weakness seems to be complicated by her left hip pain, she needs to follow-up with ortho  --given patient on going lower extremity weakness, sensory changes, complaints of bowels, at this point, we will obtain MRI cervical to rule out any type of cord compromise, or evidence of demyelination     Alteration in smell, etiology unclear  --patient describes progressive difficulty an alteration in her smell to the point where she is hypersensitive, causing additional issues with nausea, vomiting, having to use a mask  Etiology unclear     -- MRI of the brain without any changes, ongoing white matter nonspecific foci noted frontal lobes, no acute pathology, no evidence of mass olfactory region   -- patient was seen by ENT, no significant issues noted, patient unable to tolerate any type nose sprays  --patient following with PCP    Chronic pain  --patient on multiple medications, pain meds as per PCP      Patient following with Endocrine, will leave this to their expertise    No problem-specific Assessment & Plan notes found for this encounter  Diagnoses and all orders for this visit:    Weakness of both lower extremities  -     Ambulatory referral to Physical Therapy; Future  -     MRI cervical spine wo contrast; Future    Smell disturbance    Postherpetic neuralgia  -     Ambulatory referral to Physical Therapy; Future    Chronic pain syndrome    Cervicalgia  -     MRI cervical spine wo contrast; Future    Paresthesia  -     MRI cervical spine wo contrast; Future           Subjective:    HPI    Mis May is a 54-year-old female who presents today for neurologic follow-up for complaints of paresthesias and lower extremity weakness    Below HPI maintain in patient's chart secondary to complexity of case and for review  Symptoms began approximately 5 years ago, started as tingling in the back of her head on the left side  It was only intermittent  Has been increasing  It occurs randomly to include her face, either side of her head, forehead  It used to last a few seconds however now lasts up to a minute  She has associated headaches described as tension type, pressure sensation beginning in the back, behind her eyes  No nausea or vomiting  These only occur approximately 4 times a year  They usually respond to rest    She has osteoarthritis of the right hip, she takes Percocet, as well as gabapentin 300 mg t i d  which was started for post herpetic neuralgia 3 years ago  As she reports a history of cataplexy  She was diagnosed with conversion disorder in the past, with episodic symptoms of falling, not being able to breathe, questionable seizure-like episodes  These have improved  She is unable to ambulate however and mostly in a wheelchair because her legs intermittently get completely numb and weak due to her cataplexy    EMG in the past, 2017  lower extremities was normal, repeat done October 2018 once again normal  She had a skin biopsy done in the past from a bilateral calves, which demonstrated decreased epidermal nerve fiber density consistent with small fiber neuropathy  No evidence of vasculitis or amyloid was seen  No family history of vasculitis  She underwent lab work, Anca was normal, thyroid normal, B6, B12 normal   Last hemoglobin A1c noted at 6 3, she continues on insulin  She is followed by Nephrology, CKD stable and at baseline     Has a diagnosis of wegener's granulomatosis diagnosed with 2014-15, microscopic polyangitis, has CKD stage 4, DM which us under control hba1c was 6 1 recently, she is supposed to be on insulin, which she does not on a regular basis  Has GERD, Asthma   She is not on any immunotherapy, her repeat anca levels were normal      patient was last seen in June,  At that time, she continued to describe ongoing weakness and paresthesias occurring in her lower extremities  She also presented with new complaints of issues with her smell being extremely hypersensitive  In the interim, she underwent MRI of the brain,  No acute pathology noted,  Stable nonspecific white matter foci, no new lesions, early chronic small vessel ischemic changes noted  Raymundo Given She was seen by ENT, was suggested that she attempt to utilize [facility] however they were trying to get a compound noted with a floral sent which was too expensive  Today,  She continues to describe numbness in her feet bilaterally, greater on the right  She notes chronic shooting pain in her feet legs, as well as into her groin and perineum  She continues on gabapentin at bedtime  She is also on Zanaflex 3 times a day, at present she denies any specific complaints of muscle spasticity or spasms  She does note issues with daytime somnolence  Patient is on Percocet from her PCP which she uses every 6 hours  she was seen in the ER in August secondary to a fall, CT scan was negative  For any fractures, she was subsequently sent home   patient seen back in ER in September for complaints of left hip pain with secondary trochanteric bursitis  She underwent a trigger point injection with lidocaine, Marcaine and Depo-Medrol  She had immediate partial relief      She continues to utilize wheelchair, she notes frequent falls as her legs go numb  She could her describes problems with some cervical pain  She had undergone PT and OT in the past with benefit  As she notes issues with her bladder  patient following with Nephrology,  Hypertension controlled with amlodipine, last hemoglobin A1c 6 3  Renal function fairly stable  For repeat Anca in a few months     MRI brain without contrast 06/18/2019;Stable MRI of the brain with nonspecific white matter foci in the frontal white matter bilaterally  No new lesions are seen  Early chronic small vessel ischemic changes should be considered  Correlate for cardiovascular risk factors  Collagen vascular disease, sequela of competent migraine headache, sarcoidosis, or atypical demyelinating disease should be considered in the right clinical setting        The following portions of the patient's history were reviewed and updated as appropriate: allergies, current medications, past family history, past medical history, past social history, past surgical history and problem list          Objective:  Current Outpatient Medications   Medication Sig Dispense Refill    albuterol (VENTOLIN HFA) 90 mcg/act inhaler Inhale 2 puffs every 6 (six) hours as needed for wheezing 18 g 1    Alcohol Swabs (ALCOHOL PADS) 70 % PADS by Does not apply route 4 (four) times a day 400 each 3    amLODIPine (NORVASC) 5 mg tablet Take 1 tablet (5 mg total) by mouth daily 90 tablet 3    amphetamine-dextroamphetamine (ADDERALL XR) 20 MG 24 hr capsule Take 1 capsule (20 mg total) by mouth 2 (two) times a dayMax Daily Amount: 40 mg 60 capsule 0    Blood Glucose Monitoring Suppl (FREESTYLE LITE) DEIRDRE by Does not apply route daily 1 each 0  buPROPion (WELLBUTRIN XL) 150 mg 24 hr tablet bupropion HCl  mg 24 hr tablet, extended release      conjugated estrogens (PREMARIN) vaginal cream Insert 1 gram per vagina daily at bedtime x2 weeks, and then change to 1 gram twice weekly 30 g 2    cycloSPORINE (RESTASIS) 0 05 % ophthalmic emulsion Administer 1 drop to both eyes 2 (two) times a day      dicyclomine (BENTYL) 20 mg tablet Take 1 tablet (20 mg total) by mouth every 6 (six) hours as needed (pain) 10 tablet 0    EASY COMFORT LANCETS MISC USE TO TEST THREE TIMES DAILY 100 each 4    famotidine (PEPCID) 20 mg tablet Take 1 tablet (20 mg total) by mouth 2 (two) times a day 180 tablet 0    fluconazole (DIFLUCAN) 150 mg tablet fluconazole 150 mg tablet      FREESTYLE LITE test strip TEST THREE TIMES A DAY AS DIRECTED 100 each 11    gabapentin (NEURONTIN) 400 mg capsule       Humidifiers (COOL MIST HUMIDIFIER 1 GALLON) MISC by Does not apply route as needed (shortness of breath) 1 each 0    insulin aspart (NOVOLOG FLEXPEN) 100 Units/mL injection pen Inject 6 Units under the skin 3 (three) times a day with meals 5 pen 3    Insulin Pen Needle (PEN NEEDLES) 31G X 8 MM MISC Inject 1 Stick under the skin 4 (four) times a day (with meals and at bedtime) 100 each 6    lamoTRIgine (LaMICtal) 25 mg tablet lamotrigine 25 mg tablet      lidocaine (XYLOCAINE) 5 % ointment APPLY TWO GRAMS TWICE A DAY TOPICALLY AS NEEDED FOR MILD PAIN 250 g 1    montelukast (SINGULAIR) 10 mg tablet TAKE ONE TABLET BY MOUTH ONCE DAILY AT BEDTIME 90 tablet 3    MULTIPLE VITAMIN PO Take 1 capsule by mouth daily      ondansetron (ZOFRAN-ODT) 4 mg disintegrating tablet Take 1 tablet (4 mg total) by mouth every 8 (eight) hours as needed for nausea or vomiting 30 tablet 0    oxyCODONE-acetaminophen (PERCOCET) 7 5-325 MG per tablet Take 1 tablet by mouth every 6 (six) hours as needed (pain)Max Daily Amount: 4 tablets 120 tablet 0    Probiotic Product (PROBIOTIC-10 PO) Take 1 tablet by mouth daily      QUEtiapine (SEROquel) 100 mg tablet Take 1 tablet (100 mg total) by mouth daily at bedtime 90 tablet 1    TiZANidine (ZANAFLEX) 4 MG capsule TAKE 1 CAPSULE (4 MG TOTAL) BY MOUTH THREE (THREE) TIMES A DAY 90 capsule 2    TOUJEO MAX SOLOSTAR 300 units/mL CONCETRATED U-300 injection pen INJECT 22 UNITS UNDER THE SKIN DAILY 6 pen 0    Triamcinolone Acetonide 55 MCG/ACT AERO 1 Act (55 mcg total) into each nostril daily 1 Bottle 5    gabapentin (NEURONTIN) 400 mg capsule Take 1 capsule (400 mg total) by mouth 3 (three) times a day for 30 days 90 capsule 3     No current facility-administered medications for this visit  Blood pressure 150/79, pulse 73, height 5' 2" (1 575 m), weight 82 1 kg (181 lb), not currently breastfeeding  Physical Exam   Constitutional: She appears well-developed  She appears distressed  HENT:   Head: Normocephalic  Eyes: Pupils are equal, round, and reactive to light  Neck: Muscular tenderness present  Decreased range of motion present  Cardiovascular: Normal rate and regular rhythm  Pulmonary/Chest: Effort normal    Musculoskeletal: She exhibits tenderness  She exhibits no edema or deformity  Patient reports significant palpatory tenderness left hip   Neurological: Coordination normal    Reflex Scores:       Tricep reflexes are 2+ on the right side and 2+ on the left side  Brachioradialis reflexes are 2+ on the right side and 2+ on the left side  Patellar reflexes are 1+ on the right side and Tr on the left side  Achilles reflexes are 1+ on the right side and 0 on the left side  Skin: Skin is warm and intact  Psychiatric: Her speech is normal  Her affect is blunt  She is slowed  Cognition and memory are impaired  She exhibits a depressed mood  Neurological Exam  Mental Status  Awake, alert and oriented to person, place and time  Speech is normal  Language is fluent with no aphasia      Cranial Nerves  CN III, IV, VI: Extraocular movements intact bilaterally  Pupils equal round and reactive to light bilaterally  CN V: Facial sensation is normal   CN VII: Full and symmetric facial movement  CN XI: Shoulder shrug strength is normal     Motor  Decreased muscle bulk throughout  Normal muscle tone  No abnormal involuntary movements  Strength is 5/5 in all four extremities except as noted  Right                     Left   Shoulder adduction               5                          5  Elbow flexion                         5                          5  Hip flexion                              5                          3-  Hip adduction                         5                          4+  Plantarflexion                         5-                          4+  Dorsiflexion                            5-                          3+    Sensory  Temperature abnormality: Reports decreased temperature left upper extremity versus right  Vibration abnormality: Decreased vibratory perception left upper versus right, predominantly distal, decreased vibratory perception left patella  Reflexes                                           Right                      Left  Brachioradialis                    2+                         2+  Triceps                                2+                         2+  Patellar                                1+                         Tr  Achilles                                1+                         0    Coordination  Finger-to-nose, rapid alternating movements and heel-to-shin normal bilaterally without dysmetria  Gait  Casual gait:  Patient brought today in a wheelchair, up ambulated a few steps independently with significantly, very histrionic antalgic type gait favoring her left hip  ROS:    Review of Systems  Constitutional: Positive for appetite change and fatigue  Negative for fever  HENT: Positive for tinnitus   Negative for hearing loss, trouble swallowing and voice change  Sinus problems   Eyes: Negative for photophobia and pain  Dry eyes   Respiratory: Positive for shortness of breath  Cardiovascular: Negative  Negative for palpitations  Gastrointestinal: Positive for abdominal pain, nausea and vomiting  Endocrine: Negative  Negative for cold intolerance and heat intolerance  Genitourinary: Negative for dysuria, frequency and urgency  Loss of bladder control   Musculoskeletal: Positive for myalgias and neck pain  Muscle pain, pain while walking   Skin: Negative  Negative for rash  Neurological: Positive for dizziness, speech difficulty (slurred speech), numbness and headaches  Negative for tremors, seizures, syncope, facial asymmetry, weakness and light-headedness  Memory problems, facial numbness, falls, cramping, tingling, clumsiness, difficulty walking, balance problems, taste of smell changes   Hematological: Bruises/bleeds easily  Psychiatric/Behavioral: Positive for agitation (mood swings), confusion and sleep disturbance (waking up at night, trouble falling asleep, snoring)  Negative for hallucinations       ROS updated and personally reviewed with patient today's visit

## 2019-10-15 ENCOUNTER — OFFICE VISIT (OUTPATIENT)
Dept: NEUROLOGY | Facility: CLINIC | Age: 49
End: 2019-10-15
Payer: COMMERCIAL

## 2019-10-15 ENCOUNTER — TELEPHONE (OUTPATIENT)
Dept: FAMILY MEDICINE CLINIC | Facility: CLINIC | Age: 49
End: 2019-10-15

## 2019-10-15 ENCOUNTER — TELEPHONE (OUTPATIENT)
Dept: NEUROLOGY | Facility: CLINIC | Age: 49
End: 2019-10-15

## 2019-10-15 VITALS
BODY MASS INDEX: 33.31 KG/M2 | HEART RATE: 73 BPM | WEIGHT: 181 LBS | HEIGHT: 62 IN | SYSTOLIC BLOOD PRESSURE: 150 MMHG | DIASTOLIC BLOOD PRESSURE: 79 MMHG

## 2019-10-15 DIAGNOSIS — G89.4 CHRONIC PAIN SYNDROME: ICD-10-CM

## 2019-10-15 DIAGNOSIS — B02.29 POSTHERPETIC NEURALGIA: ICD-10-CM

## 2019-10-15 DIAGNOSIS — M54.2 CERVICALGIA: ICD-10-CM

## 2019-10-15 DIAGNOSIS — R43.9 SMELL DISTURBANCE: ICD-10-CM

## 2019-10-15 DIAGNOSIS — R29.898 WEAKNESS OF BOTH LOWER EXTREMITIES: Primary | ICD-10-CM

## 2019-10-15 DIAGNOSIS — R20.2 PARESTHESIA: ICD-10-CM

## 2019-10-15 PROCEDURE — 99215 OFFICE O/P EST HI 40 MIN: CPT | Performed by: NURSE PRACTITIONER

## 2019-10-15 NOTE — PATIENT INSTRUCTIONS
Will obtain MRI cervical spine  Patient to undergo physical therapy  Will decrease Zanaflex to 1 tablet twice a day, if increased muscle spasms, return to 1 tablet 3 times a day  Continue gabapentin 3 tablets at nighttime, attempt to initiate 1 tablet in a m    Patient to call in approximately 1 week with an update  Following with PCP and Nephrology

## 2019-10-15 NOTE — TELEPHONE ENCOUNTER
At check out on 10 15 19 visit, Jack Tran advised patient to schedule next follow up with Dr Brent Angel  Explained to pt that Dr Brent Angel doesn't come to [facility] location and she sees patients at the [facility] office  Willow spoke with patient and explained that she does need to follow up with Dr Brent Angel  Pt was very resistant when scheduling the follow up  Pending appt scheduled for 4 21 2019 10AM  Address and directions given to the patient  Pt stated that her home health aides probably won't be able to bring her to [facility] on 4 21 2019   Informed her that she does have a few months to work out transportation and if she does have any issues with her aides, Analy Monzon said that pt can contact the office and we can see if we can assist

## 2019-10-15 NOTE — PROGRESS NOTES
Review of Systems   Constitutional: Positive for appetite change and fatigue  Negative for fever  HENT: Positive for tinnitus  Negative for hearing loss, trouble swallowing and voice change  Sinus problems    Eyes: Negative for photophobia and pain  Dry eyes   Respiratory: Positive for shortness of breath  Cardiovascular: Negative  Negative for palpitations  Gastrointestinal: Positive for abdominal pain, nausea and vomiting  Endocrine: Negative  Negative for cold intolerance and heat intolerance  Genitourinary: Negative for dysuria, frequency and urgency  Loss of bladder control    Musculoskeletal: Positive for myalgias and neck pain  Muscle pain, pain while walking    Skin: Negative  Negative for rash  Neurological: Positive for dizziness, speech difficulty (slurred speech), numbness and headaches  Negative for tremors, seizures, syncope, facial asymmetry, weakness and light-headedness  Memory problems, facial numbness, falls, cramping, tingling, clumsiness, difficulty walking, balance problems, taste of smell changes   Hematological: Bruises/bleeds easily  Psychiatric/Behavioral: Positive for agitation (mood swings), confusion and sleep disturbance (waking up at night, trouble falling asleep, snoring)  Negative for hallucinations

## 2019-10-16 ENCOUNTER — OFFICE VISIT (OUTPATIENT)
Dept: FAMILY MEDICINE CLINIC | Facility: CLINIC | Age: 49
End: 2019-10-16

## 2019-10-16 VITALS
HEART RATE: 73 BPM | BODY MASS INDEX: 35.51 KG/M2 | TEMPERATURE: 97 F | WEIGHT: 193 LBS | DIASTOLIC BLOOD PRESSURE: 82 MMHG | HEIGHT: 62 IN | OXYGEN SATURATION: 96 % | SYSTOLIC BLOOD PRESSURE: 128 MMHG | RESPIRATION RATE: 18 BRPM

## 2019-10-16 DIAGNOSIS — G62.9 NEUROPATHY: ICD-10-CM

## 2019-10-16 DIAGNOSIS — R29.898 WEAKNESS OF BOTH LOWER EXTREMITIES: Primary | ICD-10-CM

## 2019-10-16 DIAGNOSIS — G47.411 CATAPLEXY: ICD-10-CM

## 2019-10-16 PROCEDURE — 3008F BODY MASS INDEX DOCD: CPT | Performed by: PHYSICIAN ASSISTANT

## 2019-10-16 PROCEDURE — 99213 OFFICE O/P EST LOW 20 MIN: CPT | Performed by: PHYSICIAN ASSISTANT

## 2019-10-16 NOTE — PROGRESS NOTES
Assessment/Plan:    Weakness of both lower extremities/paresthesia  - Face-to-face visit completed today for patient to obtain motorized scooter  Will send office note to Alan to continue the process for patient to obtain her scooter  Diagnoses and all orders for this visit:    Weakness of both lower extremities    Cataplexy    Neuropathy      All of patients questions were answered  Patient understands and agrees with the above plan  Return as needed  Cassy Souza PA-C  10/16/19  [facility]           Subjective:     Patient ID: Duarte Montalvo  is a 50 y o  female with known PMH of irritable bowel syndrome, gastroparesis, GERD, pancreatitis, type 2 diabetes mellitus, allergic rhinitis, asthma, hypertension, Wegener's granulomatosis, microscopic ployangiitis, weakness of both lower extremities, smell disturbance, small fiber neuropathy, post herpetic neuralgia, cataplexy, trochanteric bursitis of left hip, chronic kidney disease, stage IV, dyslipidemia, chronic pain, bipolar disorder, anemia who presents today in office for face-to-face visit for replacement motor scooter  - Patient is a 50 y o  female who presents today for a face-to-face visit for replacement motor scooter  Patient notes she currently has a power chair  Patient notes she received the chair in 2017  She notes the motor has now burned out and she is unable to navigate throughout her house in order to perform her activities of daily living  Patient notes she is now looking to receive motorized power scooter rather than a chair  Patient notes it has 4 wheels rather than 3 and it also provides better mobility with twisting and turning  Patient notes the insurance called her stating that she can receive new power scooter  Patient notes she went to the store and picked out the scooter she would like  Patient notes she was told she requires a face-to-face visit in order to receive the scooter    Patient will need this scooter for the rest of her life  Patient notes she uses a scooter for both inside and outside of her house  Patient notes she uses a scooter in order to navigate throughout her house and in order to bathe herself, feed herself, dress herself, and to groom herself  Patient is able to sit upright on her own and is able to use both of her arms independently  Patient is unable to use a manual wheelchair because it is too much of a hassle to power the wheelchair by herself with her own strength  Patient has constant weakness of both lower extremities  Patient notes she can only stand for a few minutes at a time, before her knees become very weak and she has to sit down  Patient follows with Neurology on a regular basis  Patient requires to have a motorized scooter in order to perform her activities of daily living  The following portions of the patient's history were reviewed and updated as appropriate: allergies, current medications, past family history, past medical history, past social history, past surgical history and problem list         Review of Systems   Constitutional: Positive for fatigue  Negative for appetite change and fever  HENT: Negative for congestion, ear pain, rhinorrhea and sore throat  Eyes: Negative for pain  Respiratory: Negative for cough and shortness of breath  Cardiovascular: Negative for chest pain and palpitations  Gastrointestinal: Negative for abdominal pain, constipation, diarrhea, nausea and vomiting  Genitourinary: Negative for difficulty urinating and dysuria  Musculoskeletal: Positive for arthralgias, myalgias and neck pain  Skin: Negative for rash  Neurological: Positive for dizziness, weakness, numbness and headaches  Memory problems, facial numbness, falls, cramping, tingling, clumsiness, difficulty walking, balance problems   Psychiatric/Behavioral: Negative for behavioral problems  The patient is not nervous/anxious  Objective:   Vitals:    10/16/19 0944   BP: 128/82   BP Location: Right arm   Patient Position: Sitting   Cuff Size: Standard   Pulse: 73   Resp: 18   Temp: (!) 97 °F (36 1 °C)   TempSrc: Temporal   SpO2: 96%   Weight: 87 5 kg (193 lb)   Height: 5' 2" (1 575 m)         Physical Exam   Constitutional: She is oriented to person, place, and time  She appears well-developed and well-nourished  Examined in wheelchair  HENT:   Head: Normocephalic and atraumatic  Right Ear: External ear normal    Left Ear: External ear normal    Nose: Nose normal    Mouth/Throat: Mucous membranes are normal    Eyes: Pupils are equal, round, and reactive to light  Conjunctivae and EOM are normal    Neck: Normal range of motion  Neck supple  Cardiovascular: Normal rate, regular rhythm, normal heart sounds and intact distal pulses  No murmur heard  Pulmonary/Chest: Effort normal and breath sounds normal  She has no wheezes  Musculoskeletal:   Decreased strength in both lower extremities  Neurological: She is alert and oriented to person, place, and time  A sensory deficit is present  Skin: Skin is warm and dry  Psychiatric: She has a normal mood and affect  Her speech is normal and behavior is normal    Nursing note and vitals reviewed

## 2019-10-21 ENCOUNTER — TELEPHONE (OUTPATIENT)
Dept: FAMILY MEDICINE CLINIC | Facility: CLINIC | Age: 49
End: 2019-10-21

## 2019-10-21 DIAGNOSIS — K58.9 IRRITABLE BOWEL SYNDROME, UNSPECIFIED TYPE: Primary | ICD-10-CM

## 2019-10-21 NOTE — TELEPHONE ENCOUNTER
Pt requesting script for gloves to be faxed to [phone]   States it's not going through her insurance, it's going through waiver program and scripts need to say they are for hygiene

## 2019-10-22 ENCOUNTER — HOSPITAL ENCOUNTER (EMERGENCY)
Facility: HOSPITAL | Age: 49
Discharge: HOME | End: 2019-10-22
Attending: EMERGENCY MEDICINE | Admitting: EMERGENCY MEDICINE
Payer: COMMERCIAL

## 2019-10-22 VITALS
SYSTOLIC BLOOD PRESSURE: 161 MMHG | DIASTOLIC BLOOD PRESSURE: 92 MMHG | HEART RATE: 82 BPM | OXYGEN SATURATION: 100 % | RESPIRATION RATE: 14 BRPM | TEMPERATURE: 98 F

## 2019-10-22 DIAGNOSIS — R11.10 VOMITING: ICD-10-CM

## 2019-10-22 DIAGNOSIS — R10.13 EPIGASTRIC PAIN: Primary | ICD-10-CM

## 2019-10-22 LAB
ALBUMIN SERPL BCP-MCNC: 3.7 G/DL (ref 3.5–5)
ALP SERPL-CCNC: 160 U/L (ref 46–116)
ALT SERPL W P-5'-P-CCNC: 21 U/L (ref 12–78)
ANION GAP SERPL CALCULATED.3IONS-SCNC: 11 MMOL/L (ref 4–13)
AST SERPL W P-5'-P-CCNC: 15 U/L (ref 5–45)
BACTERIA UR QL AUTO: ABNORMAL /HPF
BASOPHILS # BLD AUTO: 0.02 THOUSANDS/ΜL (ref 0–0.1)
BASOPHILS NFR BLD AUTO: 0 % (ref 0–1)
BILIRUB SERPL-MCNC: 0.28 MG/DL (ref 0.2–1)
BILIRUB UR QL STRIP: NEGATIVE
BUN SERPL-MCNC: 30 MG/DL (ref 5–25)
CALCIUM SERPL-MCNC: 9.4 MG/DL (ref 8.3–10.1)
CHLORIDE SERPL-SCNC: 104 MMOL/L (ref 100–108)
CLARITY UR: CLEAR
CO2 SERPL-SCNC: 25 MMOL/L (ref 21–32)
COLOR UR: YELLOW
CREAT SERPL-MCNC: 2.21 MG/DL (ref 0.6–1.3)
EOSINOPHIL # BLD AUTO: 0.01 THOUSAND/ΜL (ref 0–0.61)
EOSINOPHIL NFR BLD AUTO: 0 % (ref 0–6)
ERYTHROCYTE [DISTWIDTH] IN BLOOD BY AUTOMATED COUNT: 13 % (ref 11.6–15.1)
GFR SERPL CREATININE-BSD FRML MDRD: 26 ML/MIN/1.73SQ M
GLUCOSE SERPL-MCNC: 107 MG/DL (ref 65–140)
GLUCOSE UR STRIP-MCNC: NEGATIVE MG/DL
HCT VFR BLD AUTO: 43.9 % (ref 34.8–46.1)
HGB BLD-MCNC: 14 G/DL (ref 11.5–15.4)
HGB UR QL STRIP.AUTO: ABNORMAL
IMM GRANULOCYTES # BLD AUTO: 0.03 THOUSAND/UL (ref 0–0.2)
IMM GRANULOCYTES NFR BLD AUTO: 0 % (ref 0–2)
KETONES UR STRIP-MCNC: NEGATIVE MG/DL
LEUKOCYTE ESTERASE UR QL STRIP: NEGATIVE
LIPASE SERPL-CCNC: 263 U/L (ref 73–393)
LYMPHOCYTES # BLD AUTO: 1.2 THOUSANDS/ΜL (ref 0.6–4.47)
LYMPHOCYTES NFR BLD AUTO: 15 % (ref 14–44)
MCH RBC QN AUTO: 30.2 PG (ref 26.8–34.3)
MCHC RBC AUTO-ENTMCNC: 31.9 G/DL (ref 31.4–37.4)
MCV RBC AUTO: 95 FL (ref 82–98)
MONOCYTES # BLD AUTO: 0.67 THOUSAND/ΜL (ref 0.17–1.22)
MONOCYTES NFR BLD AUTO: 8 % (ref 4–12)
NEUTROPHILS # BLD AUTO: 6.15 THOUSANDS/ΜL (ref 1.85–7.62)
NEUTS SEG NFR BLD AUTO: 77 % (ref 43–75)
NITRITE UR QL STRIP: NEGATIVE
NON-SQ EPI CELLS URNS QL MICRO: ABNORMAL /HPF
NRBC BLD AUTO-RTO: 0 /100 WBCS
PH UR STRIP.AUTO: 7 [PH] (ref 4.5–8)
PLATELET # BLD AUTO: 308 THOUSANDS/UL (ref 149–390)
PMV BLD AUTO: 9.2 FL (ref 8.9–12.7)
POTASSIUM SERPL-SCNC: 4.5 MMOL/L (ref 3.5–5.3)
PROT SERPL-MCNC: 8.4 G/DL (ref 6.4–8.2)
PROT UR STRIP-MCNC: ABNORMAL MG/DL
RBC # BLD AUTO: 4.64 MILLION/UL (ref 3.81–5.12)
RBC #/AREA URNS AUTO: ABNORMAL /HPF
SODIUM SERPL-SCNC: 140 MMOL/L (ref 136–145)
SP GR UR STRIP.AUTO: 1.02 (ref 1–1.03)
UROBILINOGEN UR QL STRIP.AUTO: 0.2 E.U./DL
WBC # BLD AUTO: 8.08 THOUSAND/UL (ref 4.31–10.16)
WBC #/AREA URNS AUTO: ABNORMAL /HPF

## 2019-10-22 PROCEDURE — 96361 HYDRATE IV INFUSION ADD-ON: CPT

## 2019-10-22 PROCEDURE — 96375 TX/PRO/DX INJ NEW DRUG ADDON: CPT

## 2019-10-22 PROCEDURE — 36415 COLL VENOUS BLD VENIPUNCTURE: CPT | Performed by: EMERGENCY MEDICINE

## 2019-10-22 PROCEDURE — 99284 EMERGENCY DEPT VISIT MOD MDM: CPT

## 2019-10-22 PROCEDURE — 85025 COMPLETE CBC W/AUTO DIFF WBC: CPT | Performed by: EMERGENCY MEDICINE

## 2019-10-22 PROCEDURE — 83690 ASSAY OF LIPASE: CPT | Performed by: EMERGENCY MEDICINE

## 2019-10-22 PROCEDURE — 81001 URINALYSIS AUTO W/SCOPE: CPT

## 2019-10-22 PROCEDURE — 96374 THER/PROPH/DIAG INJ IV PUSH: CPT

## 2019-10-22 PROCEDURE — 80053 COMPREHEN METABOLIC PANEL: CPT | Performed by: EMERGENCY MEDICINE

## 2019-10-22 PROCEDURE — 99285 EMERGENCY DEPT VISIT HI MDM: CPT | Performed by: EMERGENCY MEDICINE

## 2019-10-22 RX ORDER — ONDANSETRON 4 MG/1
4 TABLET, ORALLY DISINTEGRATING ORAL EVERY 8 HOURS PRN
Qty: 30 TABLET | Refills: 0 | Status: SHIPPED | OUTPATIENT
Start: 2019-10-22 | End: 2019-12-26 | Stop reason: ALTCHOICE

## 2019-10-22 RX ORDER — FENTANYL CITRATE 50 UG/ML
25 INJECTION, SOLUTION INTRAMUSCULAR; INTRAVENOUS ONCE
Status: COMPLETED | OUTPATIENT
Start: 2019-10-22 | End: 2019-10-22

## 2019-10-22 RX ORDER — ONDANSETRON 2 MG/ML
4 INJECTION INTRAMUSCULAR; INTRAVENOUS ONCE
Status: COMPLETED | OUTPATIENT
Start: 2019-10-22 | End: 2019-10-22

## 2019-10-22 RX ADMIN — FENTANYL CITRATE 25 MCG: 50 INJECTION, SOLUTION INTRAMUSCULAR; INTRAVENOUS at 17:44

## 2019-10-22 RX ADMIN — ONDANSETRON 4 MG: 2 INJECTION INTRAMUSCULAR; INTRAVENOUS at 17:44

## 2019-10-22 RX ADMIN — SODIUM CHLORIDE 1000 ML: 0.9 INJECTION, SOLUTION INTRAVENOUS at 17:44

## 2019-10-22 NOTE — ED PROVIDER NOTES
History  Chief Complaint   Patient presents with    Abdominal Pain     Patient referred by Urgent Care for dehydration, reports nausea, vomiting, abdominal pain, pancreatitis history, started last night  41-year-old female with a history of diabetes, hypertension, chronic pain, pancreatitis, presents to the emergency department with abdominal pain that started last evening  She states she has had midepigastric pain has been constant since last night  This morning she started vomiting and has not been able to keep anything down  No fevers or chills  Her last bowel movement was this morning  She went to an urgent care center and was referred here for possible dehydration        History provided by:  Patient   used: No    Abdominal Pain   Pain location:  Epigastric  Pain quality: sharp    Pain radiates to:  Does not radiate  Pain severity:  Severe  Onset quality:  Gradual  Duration:  1 day  Timing:  Constant  Progression:  Unchanged  Chronicity:  Recurrent  Context: not alcohol use, not diet changes, not eating, not previous surgeries, not recent illness, not recent sexual activity, not sick contacts, not suspicious food intake and not trauma    Relieved by:  None tried  Worsened by:  Nothing  Ineffective treatments:  None tried  Associated symptoms: nausea and vomiting    Associated symptoms: no anorexia, no belching, no chest pain, no chills, no constipation, no cough, no diarrhea, no dysuria, no fatigue, no fever, no flatus, no hematemesis, no hematochezia, no hematuria, no melena, no shortness of breath, no sore throat, no vaginal bleeding and no vaginal discharge    Vomiting:     Quality:  Stomach contents    Number of occurrences:  Too many    Duration:  12 hours    Timing:  Intermittent    Progression:  Unchanged  Risk factors: obesity    Risk factors: no alcohol abuse, has not had multiple surgeries, no NSAID use and not pregnant        Prior to Admission Medications Prescriptions Last Dose Informant Patient Reported? Taking?    Alcohol Swabs (ALCOHOL PADS) 70 % PADS  Self No No   Sig: by Does not apply route 4 (four) times a day   Blood Glucose Monitoring Suppl (FREESTYLE LITE) DEIRDRE  Self No No   Sig: by Does not apply route daily   Disposable Gloves MISC   No No   Sig: by Does not apply route 4 (four) times a day As needed for hygiene   EASY COMFORT LANCETS MISC   No No   Sig: USE TO TEST THREE TIMES DAILY   FREESTYLE LITE test strip  Self No No   Sig: TEST THREE TIMES A DAY AS DIRECTED   Humidifiers (COOL MIST HUMIDIFIER 1 GALLON) MISC  Self No No   Sig: by Does not apply route as needed (shortness of breath)   Insulin Pen Needle (PEN NEEDLES) 31G X 8 MM MISC  Self No No   Sig: Inject 1 Stick under the skin 4 (four) times a day (with meals and at bedtime)   MULTIPLE VITAMIN PO  Self Yes No   Sig: Take 1 capsule by mouth daily   Probiotic Product (PROBIOTIC-10 PO)  Self Yes No   Sig: Take 1 tablet by mouth daily   QUEtiapine (SEROquel) 100 mg tablet  Self No No   Sig: Take 1 tablet (100 mg total) by mouth daily at bedtime   TOUJEO MAX SOLOSTAR 300 units/mL CONCETRATED U-300 injection pen  Self No No   Sig: INJECT 22 UNITS UNDER THE SKIN DAILY   TiZANidine (ZANAFLEX) 4 MG capsule  Self No No   Sig: TAKE 1 CAPSULE (4 MG TOTAL) BY MOUTH THREE (THREE) TIMES A DAY   Triamcinolone Acetonide 55 MCG/ACT AERO  Self No No   Si Act (55 mcg total) into each nostril daily   albuterol (VENTOLIN HFA) 90 mcg/act inhaler  Self No No   Sig: Inhale 2 puffs every 6 (six) hours as needed for wheezing   amLODIPine (NORVASC) 5 mg tablet  Self No No   Sig: Take 1 tablet (5 mg total) by mouth daily   amphetamine-dextroamphetamine (ADDERALL XR) 20 MG 24 hr capsule  Self No No   Sig: Take 1 capsule (20 mg total) by mouth 2 (two) times a dayMax Daily Amount: 40 mg   buPROPion (WELLBUTRIN XL) 150 mg 24 hr tablet  Self Yes No   Sig: bupropion HCl  mg 24 hr tablet, extended release   conjugated estrogens (PREMARIN) vaginal cream  Self No No   Sig: Insert 1 gram per vagina daily at bedtime x2 weeks, and then change to 1 gram twice weekly   cycloSPORINE (RESTASIS) 0 05 % ophthalmic emulsion  Self Yes No   Sig: Administer 1 drop to both eyes 2 (two) times a day   dicyclomine (BENTYL) 20 mg tablet  Self No No   Sig: Take 1 tablet (20 mg total) by mouth every 6 (six) hours as needed (pain)   famotidine (PEPCID) 20 mg tablet  Self No No   Sig: Take 1 tablet (20 mg total) by mouth 2 (two) times a day   fluconazole (DIFLUCAN) 150 mg tablet  Self Yes No   Sig: fluconazole 150 mg tablet   gabapentin (NEURONTIN) 400 mg capsule  Self No No   Sig: Take 1 capsule (400 mg total) by mouth 3 (three) times a day for 30 days   gabapentin (NEURONTIN) 400 mg capsule  Self Yes No   insulin aspart (NOVOLOG FLEXPEN) 100 Units/mL injection pen  Self No No   Sig: Inject 6 Units under the skin 3 (three) times a day with meals   lamoTRIgine (LaMICtal) 25 mg tablet  Self Yes No   Sig: lamotrigine 25 mg tablet   lidocaine (XYLOCAINE) 5 % ointment  Self No No   Sig: APPLY TWO GRAMS TWICE A DAY TOPICALLY AS NEEDED FOR MILD PAIN   montelukast (SINGULAIR) 10 mg tablet  Self No No   Sig: TAKE ONE TABLET BY MOUTH ONCE DAILY AT BEDTIME   ondansetron (ZOFRAN-ODT) 4 mg disintegrating tablet  Self No No   Sig: Take 1 tablet (4 mg total) by mouth every 8 (eight) hours as needed for nausea or vomiting   ondansetron (ZOFRAN-ODT) 4 mg disintegrating tablet   No No   Sig: Take 1 tablet (4 mg total) by mouth every 8 (eight) hours as needed for nausea or vomiting   oxyCODONE-acetaminophen (PERCOCET) 7 5-325 MG per tablet   No No   Sig: Take 1 tablet by mouth every 6 (six) hours as needed (pain)Max Daily Amount: 4 tablets      Facility-Administered Medications: None       Past Medical History:   Diagnosis Date    ADHD     Anemia of chronic disease     Anxiety     Asthma     Asthma     Borderline personality disorder (HCC)     Cataplexy     Chronic abdominal pain     CKD (chronic kidney disease) stage 3, GFR 30-59 ml/min (HCC)     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[facility] )     Cushing syndrome ([facility] )     Diabetes mellitus ([facility] )     DVT (deep venous thrombosis) (HCC)     History of acute pancreatitis     felt secondary to Bactrim    HTN (hypertension)     Hypertension     Liver disease     fatty liver    Microscopic polyangiitis (HCC)     Morbid obesity (HCC)     MPA (microscopic polyangiitis) (HCC)     Ovarian cyst     PTSD (post-traumatic stress disorder)     Renal disorder     Self-inflicted injury     self inflicted skin wounds    Wegener's granulomatosis with renal involvement ([place] [id number] )        Past Surgical History:   Procedure Laterality Date    ESOPHAGOGASTRODUODENOSCOPY  2015    mild antral gastritis    TX COLONOSCOPY FLX DX W/COLLJ SPEC WHEN PFRMD N/A 2018    Procedure: COLONOSCOPY with polypectomy;  Surgeon: Noy Rosado MD;  Location: [facility]; Service: Gastroenterology    TX ESOPHAGOGASTRODUODENOSCOPY TRANSORAL DIAGNOSTIC N/A 2018    Procedure: ESOPHAGOGASTRODUODENOSCOPY (EGD) with biopsy;  Surgeon: Noy Rosado MD;  Location: [facility]; Service: Gastroenterology    RELEASE SCAR CONTRACTURE / GRAFT REPAIRS OF HAND         Family History   Problem Relation Age of Onset    No Known Problems Mother     No Known Problems Father     Colon cancer Neg Hx     Drug abuse Neg Hx         mother father    Mental illness Neg Hx         disorder, mother father    Cancer Neg Hx     Breast cancer Neg Hx      I have reviewed and agree with the history as documented      Social History     Tobacco Use    Smoking status: Former Smoker     Last attempt to quit: 2011     Years since quittin 8    Smokeless tobacco: Never Used    Tobacco comment: marijuana   Substance Use Topics    Alcohol use: No     Frequency: Never    Drug use: Yes     Types: Marijuana     Comment: marijuana daily        Review of Systems Constitutional: Negative  Negative for chills, diaphoresis, fatigue and fever  HENT: Negative  Negative for congestion, rhinorrhea and sore throat  Eyes: Negative  Negative for discharge, redness and itching  Respiratory: Negative  Negative for apnea, cough, chest tightness, shortness of breath and wheezing  Cardiovascular: Negative for chest pain, palpitations and leg swelling  Gastrointestinal: Positive for abdominal pain, nausea and vomiting  Negative for anorexia, constipation, diarrhea, flatus, hematemesis, hematochezia and melena  Endocrine: Negative  Genitourinary: Negative  Negative for dysuria, flank pain, frequency, hematuria, urgency, vaginal bleeding and vaginal discharge  Musculoskeletal: Negative  Negative for back pain  Skin: Negative  Allergic/Immunologic: Negative  Neurological: Negative  Negative for dizziness, syncope, weakness, light-headedness, numbness and headaches  Hematological: Negative  All other systems reviewed and are negative        Physical Exam  Physical Exam    Vital Signs  ED Triage Vitals [10/22/19 1630]   Temperature Pulse Respirations Blood Pressure SpO2   98 °F (36 7 °C) 83 18 (!) 180/107 97 %      Temp Source Heart Rate Source Patient Position - Orthostatic VS BP Location FiO2 (%)   Oral Monitor Sitting Right arm --      Pain Score       7           Vitals:    10/22/19 1630 10/22/19 1840   BP: (!) 180/107 161/92   Pulse: 83 82   Patient Position - Orthostatic VS: Sitting Lying         Visual Acuity      ED Medications  Medications   sodium chloride 0 9 % bolus 1,000 mL (0 mL Intravenous Stopped 10/22/19 1840)   ondansetron (ZOFRAN) injection 4 mg (4 mg Intravenous Given 10/22/19 1744)   fentanyl citrate (PF) 100 MCG/2ML 25 mcg (25 mcg Intravenous Given 10/22/19 1744)       Diagnostic Studies  Results Reviewed     Procedure Component Value Units Date/Time    ED Urine Macroscopic [[id number]]  (Abnormal) Collected:  10/22/19 1900    Lab Status:  Final result Specimen:  Urine Updated:  10/22/19 1901     Color, UA Yellow     Clarity, UA Clear     pH, UA 7 0     Leukocytes, UA Negative     Nitrite, UA Negative     Protein,  (2+) mg/dl      Glucose, UA Negative mg/dl      Ketones, UA Negative mg/dl      Urobilinogen, UA 0 2 E U /dl      Bilirubin, UA Negative     Blood, UA Trace     Specific [place], UA 1 020    Narrative:       CLINITEK RESULT    Urine Microscopic [[id number]] Collected:  10/22/19 1900    Lab Status:  No result Specimen:  Urine, Clean Catch     Lipase [[id number]]  (Normal) Collected:  10/22/19 1744    Lab Status:  Final result Specimen:  Blood from Arm, Right Updated:  10/22/19 1809     Lipase 263 u/L     Comprehensive metabolic panel [[id number]]  (Abnormal) Collected:  10/22/19 1744    Lab Status:  Final result Specimen:  Blood from Arm, Right Updated:  10/22/19 1809     Sodium 140 mmol/L      Potassium 4 5 mmol/L      Chloride 104 mmol/L      CO2 25 mmol/L      ANION GAP 11 mmol/L      BUN 30 mg/dL      Creatinine 2 21 mg/dL      Glucose 107 mg/dL      Calcium 9 4 mg/dL      AST 15 U/L      ALT 21 U/L      Alkaline Phosphatase 160 U/L      Total Protein 8 4 g/dL      Albumin 3 7 g/dL      Total Bilirubin 0 28 mg/dL      eGFR 26 ml/min/1 73sq m     Narrative:       [facility] guidelines for Chronic Kidney Disease (CKD):     Stage 1 with normal or high GFR (GFR > 90 mL/min/1 73 square meters)    Stage 2 Mild CKD (GFR = 60-89 mL/min/1 73 square meters)    Stage 3A Moderate CKD (GFR = 45-59 mL/min/1 73 square meters)    Stage 3B Moderate CKD (GFR = 30-44 mL/min/1 73 square meters)    Stage 4 Severe CKD (GFR = 15-29 mL/min/1 73 square meters)    Stage 5 End Stage CKD (GFR <15 mL/min/1 73 square meters)  Note: GFR calculation is accurate only with a steady state creatinine    CBC and differential [[id number]]  (Abnormal) Collected:  10/22/19 1744    Lab Status:  Final result Specimen:  Blood from Arm, Right Updated:  10/22/19 1754     WBC 8 08 Thousand/uL      RBC 4 64 Million/uL      Hemoglobin 14 0 g/dL      Hematocrit 43 9 %      MCV 95 fL      MCH 30 2 pg      MCHC 31 9 g/dL      RDW 13 0 %      MPV 9 2 fL      Platelets 800 Thousands/uL      nRBC 0 /100 WBCs      Neutrophils Relative 77 %      Immat GRANS % 0 %      Lymphocytes Relative 15 %      Monocytes Relative 8 %      Eosinophils Relative 0 %      Basophils Relative 0 %      Neutrophils Absolute 6 15 Thousands/µL      Immature Grans Absolute 0 03 Thousand/uL      Lymphocytes Absolute 1 20 Thousands/µL      Monocytes Absolute 0 67 Thousand/µL      Eosinophils Absolute 0 01 Thousand/µL      Basophils Absolute 0 02 Thousands/µL     POCT urinalysis dipstick [[id number]]     Lab Status:  No result Specimen:  Urine                  No orders to display              Procedures  Procedures       ED Course  ED Course as of Oct 22 1903   Tue Oct 22, 2019   1819 Baseline   Creatinine(!): 2 21 1856 Patient feeling much better  Updated regarding results  She states she will need a refill on her Zofran  MDM    Disposition  Final diagnoses:   Epigastric pain   Vomiting     Time reflects when diagnosis was documented in both MDM as applicable and the Disposition within this note     Time User Action Codes Description Comment    10/22/2019  7:02 PM Malia Loop Add [R10 13] Epigastric pain     10/22/2019  7:02 PM Portage Hue A Add [R11 10] Vomiting       ED Disposition     ED Disposition Condition Date/Time Comment    Discharge Good Tue Oct 22, 2019  7:02 PM Neeraj Pulido discharge to home/self care              Follow-up Information     Follow up With Specialties Details Why Contact Info    Isabella Tatum PA-C Family Medicine, Physician Assistant Schedule an appointment as soon as possible for a visit in 2 days If symptoms worsen [address]  [facility]  [address]             Current Discharge Medication List      CONTINUE these medications which have CHANGED    Details   ondansetron (ZOFRAN-ODT) 4 mg disintegrating tablet Take 1 tablet (4 mg total) by mouth every 8 (eight) hours as needed for nausea or vomiting  Qty: 30 tablet, Refills: 0    Associated Diagnoses: Epigastric pain         CONTINUE these medications which have NOT CHANGED    Details   albuterol (VENTOLIN HFA) 90 mcg/act inhaler Inhale 2 puffs every 6 (six) hours as needed for wheezing  Qty: 18 g, Refills: 1    Associated Diagnoses: Mild intermittent asthma without complication      Alcohol Swabs (ALCOHOL PADS) 70 % PADS by Does not apply route 4 (four) times a day  Qty: 400 each, Refills: 3    Associated Diagnoses: Type 2 diabetes mellitus with stage 4 chronic kidney disease, unspecified whether long term insulin use ([facility])      amLODIPine (NORVASC) 5 mg tablet Take 1 tablet (5 mg total) by mouth daily  Qty: 90 tablet, Refills: 3    Associated Diagnoses: CKD (chronic kidney disease) stage 3, GFR 30-59 ml/min ([facility])      amphetamine-dextroamphetamine (ADDERALL XR) 20 MG 24 hr capsule Take 1 capsule (20 mg total) by mouth 2 (two) times a dayMax Daily Amount: 40 mg  Qty: 60 capsule, Refills: 0    Comments: Do not fill before 8/25/2019  Associated Diagnoses: Bipolar 1 disorder ([facility])      Blood Glucose Monitoring Suppl (FREESTYLE LITE) DEIRDRE by Does not apply route daily  Qty: 1 each, Refills: 0    Associated Diagnoses: Type 2 diabetes mellitus with stage 4 chronic kidney disease, unspecified whether long term insulin use ([facility])      buPROPion (WELLBUTRIN XL) 150 mg 24 hr tablet bupropion HCl  mg 24 hr tablet, extended release      conjugated estrogens (PREMARIN) vaginal cream Insert 1 gram per vagina daily at bedtime x2 weeks, and then change to 1 gram twice weekly  Qty: 30 g, Refills: 2    Associated Diagnoses: Vaginal atrophy      cycloSPORINE (RESTASIS) 0 05 % ophthalmic emulsion Administer 1 drop to both eyes 2 (two) times a day dicyclomine (BENTYL) 20 mg tablet Take 1 tablet (20 mg total) by mouth every 6 (six) hours as needed (pain)  Qty: 10 tablet, Refills: 0    Associated Diagnoses: Acute gastroenteritis      Disposable Gloves MISC by Does not apply route 4 (four) times a day As needed for hygiene  Qty: 100 each, Refills: 11    Associated Diagnoses: Irritable bowel syndrome, unspecified type      EASY COMFORT LANCETS MISC USE TO TEST THREE TIMES DAILY  Qty: 100 each, Refills: 4    Associated Diagnoses: IDDM (insulin dependent diabetes mellitus) ([facility])      famotidine (PEPCID) 20 mg tablet Take 1 tablet (20 mg total) by mouth 2 (two) times a day  Qty: 180 tablet, Refills: 0    Associated Diagnoses: Epigastric pain      fluconazole (DIFLUCAN) 150 mg tablet fluconazole 150 mg tablet      FREESTYLE LITE test strip TEST THREE TIMES A DAY AS DIRECTED  Qty: 100 each, Refills: 11    Associated Diagnoses: IDDM (insulin dependent diabetes mellitus) ([facility])      gabapentin (NEURONTIN) 400 mg capsule       Humidifiers (COOL MIST HUMIDIFIER 1 GALLON) MISC by Does not apply route as needed (shortness of breath)  Qty: 1 each, Refills: 0    Associated Diagnoses: Difficulty breathing      insulin aspart (NOVOLOG FLEXPEN) 100 Units/mL injection pen Inject 6 Units under the skin 3 (three) times a day with meals  Qty: 5 pen, Refills: 3    Associated Diagnoses: IDDM (insulin dependent diabetes mellitus) ([facility])      Insulin Pen Needle (PEN NEEDLES) 31G X 8 MM MISC Inject 1 Stick under the skin 4 (four) times a day (with meals and at bedtime)  Qty: 100 each, Refills: 6    Associated Diagnoses: IDDM (insulin dependent diabetes mellitus) ([facility])      lamoTRIgine (LaMICtal) 25 mg tablet lamotrigine 25 mg tablet      lidocaine (XYLOCAINE) 5 % ointment APPLY TWO GRAMS TWICE A DAY TOPICALLY AS NEEDED FOR MILD PAIN  Qty: 250 g, Refills: 1    Associated Diagnoses: Chronic pain syndrome      montelukast (SINGULAIR) 10 mg tablet TAKE ONE TABLET BY MOUTH ONCE DAILY AT BEDTIME  Qty: 90 tablet, Refills: 3    Associated Diagnoses: Seasonal allergic rhinitis due to pollen      MULTIPLE VITAMIN PO Take 1 capsule by mouth daily      oxyCODONE-acetaminophen (PERCOCET) 7 5-325 MG per tablet Take 1 tablet by mouth every 6 (six) hours as needed (pain)Max Daily Amount: 4 tablets  Qty: 120 tablet, Refills: 0    Comments: Continuation of therapy  We do not fill before 8/4/2019  Associated Diagnoses: Wegener's granulomatosis ([address] ); Chronic pain syndrome      Probiotic Product (PROBIOTIC-10 PO) Take 1 tablet by mouth daily      QUEtiapine (SEROquel) 100 mg tablet Take 1 tablet (100 mg total) by mouth daily at bedtime  Qty: 90 tablet, Refills: 1    Associated Diagnoses: Bipolar 1 disorder (HCC)      TiZANidine (ZANAFLEX) 4 MG capsule TAKE 1 CAPSULE (4 MG TOTAL) BY MOUTH THREE (THREE) TIMES A DAY  Qty: 90 capsule, Refills: 2    Associated Diagnoses: Chronic low back pain with bilateral sciatica, unspecified back pain laterality      TOUJEO MAX SOLOSTAR 300 units/mL CONCETRATED U-300 injection pen INJECT 22 UNITS UNDER THE SKIN DAILY  Qty: 6 pen, Refills: 0    Associated Diagnoses: IDDM (insulin dependent diabetes mellitus) ([facility])      Triamcinolone Acetonide 55 MCG/ACT AERO 1 Act (55 mcg total) into each nostril daily  Qty: 1 Bottle, Refills: 5    Associated Diagnoses: Parosmia           No discharge procedures on file      ED Provider  Electronically Signed by           Jackelin Castano DO  10/22/19 5347

## 2019-10-25 DIAGNOSIS — F31.9 BIPOLAR 1 DISORDER (HCC): ICD-10-CM

## 2019-10-25 NOTE — TELEPHONE ENCOUNTER
Patient called to stat wont have any medication for the weekend and needs this called in today       amphetamine-dextroamphetamine (ADDERALL XR) 20 MG 24 hr capsule

## 2019-10-28 RX ORDER — DEXTROAMPHETAMINE SACCHARATE, AMPHETAMINE ASPARTATE MONOHYDRATE, DEXTROAMPHETAMINE SULFATE AND AMPHETAMINE SULFATE 5; 5; 5; 5 MG/1; MG/1; MG/1; MG/1
20 CAPSULE, EXTENDED RELEASE ORAL 2 TIMES DAILY
Qty: 60 CAPSULE | Refills: 0 | Status: SHIPPED | OUTPATIENT
Start: 2019-10-28 | End: 2019-11-19 | Stop reason: SDUPTHER

## 2019-10-29 DIAGNOSIS — G89.4 CHRONIC PAIN SYNDROME: ICD-10-CM

## 2019-10-29 DIAGNOSIS — M31.30 WEGENER'S GRANULOMATOSIS: Chronic | ICD-10-CM

## 2019-10-29 RX ORDER — OXYCODONE AND ACETAMINOPHEN 7.5; 325 MG/1; MG/1
1 TABLET ORAL EVERY 6 HOURS PRN
Qty: 120 TABLET | Refills: 0 | Status: SHIPPED | OUTPATIENT
Start: 2019-10-29 | End: 2019-11-25 | Stop reason: SDUPTHER

## 2019-10-31 ENCOUNTER — TELEPHONE (OUTPATIENT)
Dept: NEUROLOGY | Facility: CLINIC | Age: 49
End: 2019-10-31

## 2019-10-31 NOTE — TELEPHONE ENCOUNTER
Pt called and states that she saw madai few weeks ago and ordered cervical MRI and PT  She was informed by her insurance company that MRI was denied bec not enough info was provided  Spoke w/ Talat Zhong and states that Lester Wilson was faxed on 10/17/19  This PA was initially denied but went currently now under reconsideration as 10/30/19 4:30 pm-PA still pending  Pt made aware     Also, pt states that she always gets in home PT   Requesting updated PT referral                    [phone] ok to leave detailed message

## 2019-11-06 ENCOUNTER — TELEPHONE (OUTPATIENT)
Dept: FAMILY MEDICINE CLINIC | Facility: CLINIC | Age: 49
End: 2019-11-06

## 2019-11-07 ENCOUNTER — HOSPITAL ENCOUNTER (EMERGENCY)
Facility: HOSPITAL | Age: 49
Discharge: HOME | End: 2019-11-07
Attending: EMERGENCY MEDICINE | Admitting: EMERGENCY MEDICINE
Payer: COMMERCIAL

## 2019-11-07 VITALS
OXYGEN SATURATION: 98 % | SYSTOLIC BLOOD PRESSURE: 162 MMHG | BODY MASS INDEX: 39.44 KG/M2 | TEMPERATURE: 98.3 F | WEIGHT: 215.61 LBS | HEART RATE: 70 BPM | RESPIRATION RATE: 17 BRPM | DIASTOLIC BLOOD PRESSURE: 98 MMHG

## 2019-11-07 DIAGNOSIS — R10.9 ABDOMINAL PAIN: ICD-10-CM

## 2019-11-07 DIAGNOSIS — R11.2 NAUSEA & VOMITING: Primary | ICD-10-CM

## 2019-11-07 LAB
ALBUMIN SERPL BCP-MCNC: 3.7 G/DL (ref 3.5–5)
ALP SERPL-CCNC: 141 U/L (ref 46–116)
ALT SERPL W P-5'-P-CCNC: 24 U/L (ref 12–78)
ANION GAP SERPL CALCULATED.3IONS-SCNC: 11 MMOL/L (ref 4–13)
AST SERPL W P-5'-P-CCNC: 17 U/L (ref 5–45)
ATRIAL RATE: 72 BPM
BACTERIA UR QL AUTO: ABNORMAL /HPF
BASOPHILS # BLD AUTO: 0.01 THOUSANDS/ΜL (ref 0–0.1)
BASOPHILS NFR BLD AUTO: 0 % (ref 0–1)
BILIRUB SERPL-MCNC: 0.31 MG/DL (ref 0.2–1)
BILIRUB UR QL STRIP: NEGATIVE
BUN SERPL-MCNC: 21 MG/DL (ref 5–25)
CALCIUM SERPL-MCNC: 9.6 MG/DL (ref 8.3–10.1)
CHLORIDE SERPL-SCNC: 108 MMOL/L (ref 100–108)
CLARITY UR: CLEAR
CO2 SERPL-SCNC: 24 MMOL/L (ref 21–32)
COLOR UR: YELLOW
CREAT SERPL-MCNC: 1.75 MG/DL (ref 0.6–1.3)
EOSINOPHIL # BLD AUTO: 0.01 THOUSAND/ΜL (ref 0–0.61)
EOSINOPHIL NFR BLD AUTO: 0 % (ref 0–6)
ERYTHROCYTE [DISTWIDTH] IN BLOOD BY AUTOMATED COUNT: 13.4 % (ref 11.6–15.1)
EXT PREG TEST URINE: NEGATIVE
EXT. CONTROL ED NAV: NORMAL
GFR SERPL CREATININE-BSD FRML MDRD: 34 ML/MIN/1.73SQ M
GLUCOSE SERPL-MCNC: 123 MG/DL (ref 65–140)
GLUCOSE UR STRIP-MCNC: NEGATIVE MG/DL
HCT VFR BLD AUTO: 42.8 % (ref 34.8–46.1)
HGB BLD-MCNC: 13.6 G/DL (ref 11.5–15.4)
HGB UR QL STRIP.AUTO: ABNORMAL
IMM GRANULOCYTES # BLD AUTO: 0.03 THOUSAND/UL (ref 0–0.2)
IMM GRANULOCYTES NFR BLD AUTO: 0 % (ref 0–2)
KETONES UR STRIP-MCNC: NEGATIVE MG/DL
LEUKOCYTE ESTERASE UR QL STRIP: ABNORMAL
LIPASE SERPL-CCNC: 364 U/L (ref 73–393)
LYMPHOCYTES # BLD AUTO: 0.73 THOUSANDS/ΜL (ref 0.6–4.47)
LYMPHOCYTES NFR BLD AUTO: 8 % (ref 14–44)
MCH RBC QN AUTO: 30.4 PG (ref 26.8–34.3)
MCHC RBC AUTO-ENTMCNC: 31.8 G/DL (ref 31.4–37.4)
MCV RBC AUTO: 96 FL (ref 82–98)
MONOCYTES # BLD AUTO: 0.28 THOUSAND/ΜL (ref 0.17–1.22)
MONOCYTES NFR BLD AUTO: 3 % (ref 4–12)
NEUTROPHILS # BLD AUTO: 7.6 THOUSANDS/ΜL (ref 1.85–7.62)
NEUTS SEG NFR BLD AUTO: 89 % (ref 43–75)
NITRITE UR QL STRIP: NEGATIVE
NON-SQ EPI CELLS URNS QL MICRO: ABNORMAL /HPF
NRBC BLD AUTO-RTO: 0 /100 WBCS
P AXIS: 31 DEGREES
PH UR STRIP.AUTO: 7 [PH] (ref 4.5–8)
PLATELET # BLD AUTO: 276 THOUSANDS/UL (ref 149–390)
PMV BLD AUTO: 10 FL (ref 8.9–12.7)
POTASSIUM SERPL-SCNC: 4.2 MMOL/L (ref 3.5–5.3)
PR INTERVAL: 134 MS
PROT SERPL-MCNC: 7.9 G/DL (ref 6.4–8.2)
PROT UR STRIP-MCNC: >=300 MG/DL
QRS AXIS: -21 DEGREES
QRSD INTERVAL: 68 MS
QT INTERVAL: 390 MS
QTC INTERVAL: 427 MS
RBC # BLD AUTO: 4.48 MILLION/UL (ref 3.81–5.12)
RBC #/AREA URNS AUTO: ABNORMAL /HPF
SODIUM SERPL-SCNC: 143 MMOL/L (ref 136–145)
SP GR UR STRIP.AUTO: >=1.03 (ref 1–1.03)
T WAVE AXIS: -19 DEGREES
UROBILINOGEN UR QL STRIP.AUTO: 0.2 E.U./DL
VENTRICULAR RATE: 72 BPM
WBC # BLD AUTO: 8.66 THOUSAND/UL (ref 4.31–10.16)
WBC #/AREA URNS AUTO: ABNORMAL /HPF

## 2019-11-07 PROCEDURE — 36415 COLL VENOUS BLD VENIPUNCTURE: CPT | Performed by: EMERGENCY MEDICINE

## 2019-11-07 PROCEDURE — 99284 EMERGENCY DEPT VISIT MOD MDM: CPT | Performed by: EMERGENCY MEDICINE

## 2019-11-07 PROCEDURE — 96375 TX/PRO/DX INJ NEW DRUG ADDON: CPT

## 2019-11-07 PROCEDURE — C9113 INJ PANTOPRAZOLE SODIUM, VIA: HCPCS | Performed by: EMERGENCY MEDICINE

## 2019-11-07 PROCEDURE — 85025 COMPLETE CBC W/AUTO DIFF WBC: CPT | Performed by: EMERGENCY MEDICINE

## 2019-11-07 PROCEDURE — 81001 URINALYSIS AUTO W/SCOPE: CPT

## 2019-11-07 PROCEDURE — 93010 ELECTROCARDIOGRAM REPORT: CPT | Performed by: INTERNAL MEDICINE

## 2019-11-07 PROCEDURE — 96361 HYDRATE IV INFUSION ADD-ON: CPT

## 2019-11-07 PROCEDURE — 99284 EMERGENCY DEPT VISIT MOD MDM: CPT

## 2019-11-07 PROCEDURE — 81025 URINE PREGNANCY TEST: CPT | Performed by: EMERGENCY MEDICINE

## 2019-11-07 PROCEDURE — 93005 ELECTROCARDIOGRAM TRACING: CPT

## 2019-11-07 PROCEDURE — 80053 COMPREHEN METABOLIC PANEL: CPT | Performed by: EMERGENCY MEDICINE

## 2019-11-07 PROCEDURE — 96374 THER/PROPH/DIAG INJ IV PUSH: CPT

## 2019-11-07 PROCEDURE — 83690 ASSAY OF LIPASE: CPT | Performed by: EMERGENCY MEDICINE

## 2019-11-07 RX ORDER — ONDANSETRON 2 MG/ML
4 INJECTION INTRAMUSCULAR; INTRAVENOUS ONCE
Status: COMPLETED | OUTPATIENT
Start: 2019-11-07 | End: 2019-11-07

## 2019-11-07 RX ORDER — DICYCLOMINE HCL 20 MG
20 TABLET ORAL 2 TIMES DAILY
Qty: 10 TABLET | Refills: 0 | Status: SHIPPED | OUTPATIENT
Start: 2019-11-07 | End: 2020-11-10

## 2019-11-07 RX ORDER — DICYCLOMINE HCL 20 MG
20 TABLET ORAL ONCE
Status: COMPLETED | OUTPATIENT
Start: 2019-11-07 | End: 2019-11-07

## 2019-11-07 RX ORDER — PANTOPRAZOLE SODIUM 40 MG/1
40 INJECTION, POWDER, FOR SOLUTION INTRAVENOUS ONCE
Status: COMPLETED | OUTPATIENT
Start: 2019-11-07 | End: 2019-11-07

## 2019-11-07 RX ORDER — METOCLOPRAMIDE 10 MG/1
10 TABLET ORAL EVERY 6 HOURS
Qty: 15 TABLET | Refills: 0 | Status: SHIPPED | OUTPATIENT
Start: 2019-11-07 | End: 2020-04-22

## 2019-11-07 RX ADMIN — DICYCLOMINE HYDROCHLORIDE 20 MG: 20 TABLET ORAL at 11:44

## 2019-11-07 RX ADMIN — PANTOPRAZOLE SODIUM 40 MG: 40 INJECTION, POWDER, FOR SOLUTION INTRAVENOUS at 10:23

## 2019-11-07 RX ADMIN — SODIUM CHLORIDE 1000 ML: 0.9 INJECTION, SOLUTION INTRAVENOUS at 10:49

## 2019-11-07 RX ADMIN — ONDANSETRON 4 MG: 2 INJECTION INTRAMUSCULAR; INTRAVENOUS at 10:23

## 2019-11-07 NOTE — ED PROVIDER NOTES
History  Chief Complaint   Patient presents with    Vomiting     pt reports N/V, dry heave, generalized fatigue, chills for several weeks  reports was seen in department approx 2 weeks ago for similar complaint and was disharged  pt also reporting abd pain denies fevers  History provided by:  Patient   used: No    Vomiting   Severity:  Moderate  Duration:  2 weeks  Timing:  Intermittent  Quality:  Stomach contents  Able to tolerate:  Liquids  Progression:  Unchanged  Chronicity:  Recurrent  Recent urination:  Normal  Context: not post-tussive    Relieved by:  Nothing  Worsened by:  Nothing  Ineffective treatments:  None tried  Associated symptoms: abdominal pain    Associated symptoms: no chills, no cough, no diarrhea, no fever, no headaches and no sore throat    Abdominal pain:     Location:  Epigastric    Quality: cramping      Severity:  Moderate    Onset quality:  Gradual    Duration:  2 weeks    Timing:  Intermittent    Progression:  Waxing and waning    Chronicity:  New  Risk factors: no sick contacts    Risk factors comment:  Hx of Gastritis, Gastroparesis      Prior to Admission Medications   Prescriptions Last Dose Informant Patient Reported? Taking?    Alcohol Swabs (ALCOHOL PADS) 70 % PADS  Self No No   Sig: by Does not apply route 4 (four) times a day   Blood Glucose Monitoring Suppl (FREESTYLE LITE) DEIRDRE  Self No No   Sig: by Does not apply route daily   Disposable Gloves MISC   No No   Sig: by Does not apply route 4 (four) times a day As needed for hygiene   EASY COMFORT LANCETS MISC   No No   Sig: USE TO TEST THREE TIMES DAILY   FREESTYLE LITE test strip  Self No No   Sig: TEST THREE TIMES A DAY AS DIRECTED   Humidifiers (COOL MIST HUMIDIFIER 1 GALLON) MISC  Self No No   Sig: by Does not apply route as needed (shortness of breath)   Insulin Pen Needle (PEN NEEDLES) 31G X 8 MM MISC  Self No No   Sig: Inject 1 Stick under the skin 4 (four) times a day (with meals and at bedtime)   MULTIPLE VITAMIN PO  Self Yes No   Sig: Take 1 capsule by mouth daily   Probiotic Product (PROBIOTIC-10 PO)  Self Yes No   Sig: Take 1 tablet by mouth daily   QUEtiapine (SEROquel) 100 mg tablet  Self No No   Sig: Take 1 tablet (100 mg total) by mouth daily at bedtime   TOUJEO MAX SOLOSTAR 300 units/mL CONCETRATED U-300 injection pen  Self No No   Sig: INJECT 22 UNITS UNDER THE SKIN DAILY   TiZANidine (ZANAFLEX) 4 MG capsule  Self No No   Sig: TAKE 1 CAPSULE (4 MG TOTAL) BY MOUTH THREE (THREE) TIMES A DAY   Triamcinolone Acetonide 55 MCG/ACT AERO  Self No No   Si Act (55 mcg total) into each nostril daily   albuterol (VENTOLIN HFA) 90 mcg/act inhaler  Self No No   Sig: Inhale 2 puffs every 6 (six) hours as needed for wheezing   amLODIPine (NORVASC) 5 mg tablet  Self No No   Sig: Take 1 tablet (5 mg total) by mouth daily   amphetamine-dextroamphetamine (ADDERALL XR) 20 MG 24 hr capsule   No No   Sig: Take 1 capsule (20 mg total) by mouth 2 (two) times a dayMax Daily Amount: 40 mg   buPROPion (WELLBUTRIN XL) 150 mg 24 hr tablet  Self Yes No   Sig: bupropion HCl  mg 24 hr tablet, extended release   conjugated estrogens (PREMARIN) vaginal cream  Self No No   Sig: Insert 1 gram per vagina daily at bedtime x2 weeks, and then change to 1 gram twice weekly   cycloSPORINE (RESTASIS) 0 05 % ophthalmic emulsion  Self Yes No   Sig: Administer 1 drop to both eyes 2 (two) times a day   dicyclomine (BENTYL) 20 mg tablet  Self No No   Sig: Take 1 tablet (20 mg total) by mouth every 6 (six) hours as needed (pain)   famotidine (PEPCID) 20 mg tablet  Self No No   Sig: Take 1 tablet (20 mg total) by mouth 2 (two) times a day   fluconazole (DIFLUCAN) 150 mg tablet  Self Yes No   Sig: fluconazole 150 mg tablet   gabapentin (NEURONTIN) 400 mg capsule  Self No No   Sig: Take 1 capsule (400 mg total) by mouth 3 (three) times a day for 30 days   gabapentin (NEURONTIN) 400 mg capsule  Self Yes No   insulin aspart (NOVOLOG FLEXPEN) 100 Units/mL injection pen  Self No No   Sig: Inject 6 Units under the skin 3 (three) times a day with meals   lamoTRIgine (LaMICtal) 25 mg tablet  Self Yes No   Sig: lamotrigine 25 mg tablet   lidocaine (XYLOCAINE) 5 % ointment  Self No No   Sig: APPLY TWO GRAMS TWICE A DAY TOPICALLY AS NEEDED FOR MILD PAIN   montelukast (SINGULAIR) 10 mg tablet  Self No No   Sig: TAKE ONE TABLET BY MOUTH ONCE DAILY AT BEDTIME   ondansetron (ZOFRAN-ODT) 4 mg disintegrating tablet   No No   Sig: Take 1 tablet (4 mg total) by mouth every 8 (eight) hours as needed for nausea or vomiting   oxyCODONE-acetaminophen (PERCOCET) 7 5-325 MG per tablet   No No   Sig: Take 1 tablet by mouth every 6 (six) hours as needed (pain)Max Daily Amount: 4 tablets      Facility-Administered Medications: None       Past Medical History:   Diagnosis Date    ADHD     Anemia of chronic disease     Anxiety     Asthma     Asthma     Borderline personality disorder (HCC)     Cataplexy     Chronic abdominal pain     CKD (chronic kidney disease) stage 3, GFR 30-59 ml/min (HCC)     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HCC)     Cushing syndrome (HCC)     Diabetes mellitus ([facility] )     DVT (deep venous thrombosis) (HCC)     History of acute pancreatitis     felt secondary to Bactrim    HTN (hypertension)     Hypertension     Liver disease     fatty liver    Microscopic polyangiitis (HCC)     Morbid obesity (HCC)     MPA (microscopic polyangiitis) (HCC)     Ovarian cyst     PTSD (post-traumatic stress disorder)     Renal disorder     Self-inflicted injury     self inflicted skin wounds    Wegener's granulomatosis with renal involvement ([facility] ) 2015       Past Surgical History:   Procedure Laterality Date    ESOPHAGOGASTRODUODENOSCOPY  09/11/2015    mild antral gastritis    KS COLONOSCOPY FLX DX W/COLLJ SPEC WHEN PFRMD N/A 12/14/2018    Procedure: COLONOSCOPY with polypectomy;  Surgeon: Alesia Dao MD;  Location: [facility];   Service: Gastroenterology  MS ESOPHAGOGASTRODUODENOSCOPY TRANSORAL DIAGNOSTIC N/A 2018    Procedure: ESOPHAGOGASTRODUODENOSCOPY (EGD) with biopsy;  Surgeon: Iris Wilson MD;  Location: [facility]; Service: Gastroenterology    RELEASE SCAR CONTRACTURE / GRAFT REPAIRS OF HAND         Family History   Problem Relation Age of Onset    No Known Problems Mother     No Known Problems Father     Colon cancer Neg Hx     Drug abuse Neg Hx         mother father    Mental illness Neg Hx         disorder, mother father    Cancer Neg Hx     Breast cancer Neg Hx      I have reviewed and agree with the history as documented  Social History     Tobacco Use    Smoking status: Former Smoker     Last attempt to quit:      Years since quittin 8    Smokeless tobacco: Never Used    Tobacco comment: marijuana   Substance Use Topics    Alcohol use: No     Frequency: Never    Drug use: Yes     Types: Marijuana     Comment: marijuana daily        Review of Systems   Constitutional: Negative for chills and fever  HENT: Negative for facial swelling, sore throat and trouble swallowing  Eyes: Negative for pain and visual disturbance  Respiratory: Negative for cough and shortness of breath  Cardiovascular: Negative for chest pain and leg swelling  Gastrointestinal: Positive for abdominal pain and vomiting  Negative for blood in stool, diarrhea and nausea  Genitourinary: Negative for dysuria and flank pain  Musculoskeletal: Negative for back pain, neck pain and neck stiffness  Skin: Negative for pallor and rash  Allergic/Immunologic: Negative for environmental allergies and immunocompromised state  Neurological: Negative for dizziness and headaches  Hematological: Negative for adenopathy  Does not bruise/bleed easily  Psychiatric/Behavioral: Negative for agitation and behavioral problems  All other systems reviewed and are negative        Physical Exam  Physical Exam   Constitutional: She is oriented to person, place, and time  She appears well-developed and well-nourished  No distress  HENT:   Head: Normocephalic and atraumatic  Eyes: EOM are normal    Neck: Normal range of motion  Neck supple  Cardiovascular: Normal rate, regular rhythm, normal heart sounds and intact distal pulses  Pulmonary/Chest: Effort normal and breath sounds normal    Abdominal: Soft  Bowel sounds are normal  There is tenderness (epigastric)  There is no rebound and no guarding  Musculoskeletal: Normal range of motion  Neurological: She is alert and oriented to person, place, and time  Skin: Skin is warm and dry  Psychiatric: She has a normal mood and affect  Nursing note and vitals reviewed        Vital Signs  ED Triage Vitals [11/07/19 1006]   Temperature Pulse Respirations Blood Pressure SpO2   98 3 °F (36 8 °C) 78 17 (!) 182/95 96 %      Temp Source Heart Rate Source Patient Position - Orthostatic VS BP Location FiO2 (%)   Oral Monitor Lying Right arm --      Pain Score       --           Vitals:    11/07/19 1006 11/07/19 1215   BP: (!) 182/95 162/98   Pulse: 78 70   Patient Position - Orthostatic VS: Lying Sitting         Visual Acuity      ED Medications  Medications   sodium chloride 0 9 % bolus 1,000 mL (0 mL Intravenous Stopped 11/7/19 1206)   ondansetron (ZOFRAN) injection 4 mg (4 mg Intravenous Given 11/7/19 1023)   pantoprazole (PROTONIX) injection 40 mg (40 mg Intravenous Given 11/7/19 1023)   dicyclomine (BENTYL) tablet 20 mg (20 mg Oral Given 11/7/19 1144)       Diagnostic Studies  Results Reviewed     Procedure Component Value Units Date/Time    Urine Microscopic [[id number]]  (Abnormal) Collected:  11/07/19 1211    Lab Status:  Final result Specimen:  Urine, Clean Catch Updated:  11/07/19 1229     RBC, UA 0-1 /hpf      WBC, UA 0-1 /hpf      Epithelial Cells Occasional /hpf      Bacteria, UA Occasional /hpf     POCT pregnancy, urine [[id number]]  (Normal) Resulted:  11/07/19 1215    Lab Status:  Final result Updated: 11/07/19 1215     EXT PREG TEST UR (Ref: Negative) negative     Control valid    POCT urinalysis dipstick [[id number]]  (Abnormal) Resulted:  11/07/19 1215    Lab Status:  Final result Specimen:  Urine Updated:  11/07/19 1215    ED Urine Macroscopic [[id number]]  (Abnormal) Collected:  11/07/19 1211    Lab Status:  Final result Specimen:  Urine Updated:  11/07/19 1212     Color, UA Yellow     Clarity, UA Clear     pH, UA 7 0     Leukocytes, UA Trace     Nitrite, UA Negative     Protein, UA >=300 mg/dl      Glucose, UA Negative mg/dl      Ketones, UA Negative mg/dl      Urobilinogen, UA 0 2 E U /dl      Bilirubin, UA Negative     Blood, UA Trace     Specific Gravity, UA >=1 030    Narrative:       CLINITEK RESULT    Comprehensive metabolic panel [[id number]]  (Abnormal) Collected:  11/07/19 1049    Lab Status:  Final result Specimen:  Blood from Arm, Right Updated:  11/07/19 1112     Sodium 143 mmol/L      Potassium 4 2 mmol/L      Chloride 108 mmol/L      CO2 24 mmol/L      ANION GAP 11 mmol/L      BUN 21 mg/dL      Creatinine 1 75 mg/dL      Glucose 123 mg/dL      Calcium 9 6 mg/dL      AST 17 U/L      ALT 24 U/L      Alkaline Phosphatase 141 U/L      Total Protein 7 9 g/dL      Albumin 3 7 g/dL      Total Bilirubin 0 31 mg/dL      eGFR 34 ml/min/1 73sq m     Narrative:       [place] guidelines for Chronic Kidney Disease (CKD):     Stage 1 with normal or high GFR (GFR > 90 mL/min/1 73 square meters)    Stage 2 Mild CKD (GFR = 60-89 mL/min/1 73 square meters)    Stage 3A Moderate CKD (GFR = 45-59 mL/min/1 73 square meters)    Stage 3B Moderate CKD (GFR = 30-44 mL/min/1 73 square meters)    Stage 4 Severe CKD (GFR = 15-29 mL/min/1 73 square meters)    Stage 5 End Stage CKD (GFR <15 mL/min/1 73 square meters)  Note: GFR calculation is accurate only with a steady state creatinine    Lipase [[id number]]  (Normal) Collected:  11/07/19 1049    Lab Status:  Final result Specimen:  Blood from [place], Right Updated:  11/07/19 1112     Lipase 364 u/L     CBC and differential [[id number]]  (Abnormal) Collected:  11/07/19 1049    Lab Status:  Final result Specimen:  Blood from Arm, Right Updated:  11/07/19 1056     WBC 8 66 Thousand/uL      RBC 4 48 Million/uL      Hemoglobin 13 6 g/dL      Hematocrit 42 8 %      MCV 96 fL      MCH 30 4 pg      MCHC 31 8 g/dL      RDW 13 4 %      MPV 10 0 fL      Platelets 234 Thousands/uL      nRBC 0 /100 WBCs      Neutrophils Relative 89 %      Immat GRANS % 0 %      Lymphocytes Relative 8 %      Monocytes Relative 3 %      Eosinophils Relative 0 %      Basophils Relative 0 %      Neutrophils Absolute 7 60 Thousands/µL      Immature Grans Absolute 0 03 Thousand/uL      Lymphocytes Absolute 0 73 Thousands/µL      Monocytes Absolute 0 28 Thousand/µL      Eosinophils Absolute 0 01 Thousand/µL      Basophils Absolute 0 01 Thousands/µL                  No orders to display              Procedures  ECG 12 Lead Documentation Only  Date/Time: 11/7/2019 11:24 AM  Performed by: Eliseo Sarabia MD  Authorized by: Eliseo Sarabia MD     Indications / Diagnosis:  Epigastric pain  ECG reviewed by me, the ED Provider: yes    Patient location:  ED  Interpretation:     Interpretation: normal    Rate:     ECG rate:  72    ECG rate assessment: normal    Rhythm:     Rhythm: sinus rhythm    Ectopy:     Ectopy: none    QRS:     QRS axis:  Normal  Conduction:     Conduction: normal    ST segments:     ST segments:  Normal  T waves:     T waves: normal             ED Course  ED Course as of Nov 07 2029   Thu Nov 07, 2019   1124 WBC: 8 66   1124 Hemoglobin: 13 6   1124 Sodium: 143   1124 Potassium: 4 2   1124 BUN: 21   1124 Creatinine(!): 1 75   1124 Labs reviewed, CBC within normal limits, creatinine 1 75, actually improved from baseline 2s     Lipase: 364   1219 Patient re-evaluated, feels better, will give Zofran, Bentyl for possible gastroparesis versus viral GI illness, advised follow-up with Gastroenterology  MDM  Number of Diagnoses or Management Options  Abdominal pain:   Nausea & vomiting: new and requires workup  Diagnosis management comments: Patient is a 59-year-old female, history of gastritis, gastroparesis, comes in with recurrent persisting nausea, vomiting, states that she was seen here 2 weeks back, and nausea and vomiting has not gotten better, did not follow up with PCP or gastroenterologist, however states that she is taking Prilosec; denies chest pain, dyspnea, fever, diarrhea, blood in vomiting  On exam no acute distress:  Vital signs noted for mild hypertension on arrival, lungs clear, heart sounds normal, abdomen is soft, nondistended, mild tenderness in epigastrium was noted, no lower quadrant tenderness, negative Garner sign  Differential diagnosis:  Persistent gastritis, gastroparesis symptoms, pancreatitis, viral GI illness, will check labs including CBC, CMP, lipase, EKG for atypical disease presentation, however no chest pain, will not get troponin unless abnormal EKG; will give IV fluids, Zofran, ppi  Amount and/or Complexity of Data Reviewed  Clinical lab tests: reviewed and ordered  Tests in the medicine section of CPT®: ordered and reviewed  Review and summarize past medical records: yes        Disposition  Final diagnoses:   Nausea & vomiting   Abdominal pain     Time reflects when diagnosis was documented in both MDM as applicable and the Disposition within this note     Time User Action Codes Description Comment    11/7/2019 10:26 AM Kassie Corral [R11 2] Nausea & vomiting     11/7/2019 12:20 PM Kassie Corral [R10 9] Abdominal pain       ED Disposition     ED Disposition Condition Date/Time Comment    Discharge Stable Thu Nov 7, 2019 12:20 PM Solomon Glass discharge to home/self care              Follow-up Information     Follow up With Specialties Details Why Contact Info Additional Information    Florentino Davidson PA-C Family Medicine, Physician Assistant Schedule an appointment as soon as possible for a visit   [address]  [address] [facility] [address] [address] [facility] [place] [facility] Schedule an appointment as soon as possible for a visit   [address]  [place] [address] [facility] [address] [phone] [facility] [place], [address], [address], [place], [place], [address] [phone]          Discharge Medication List as of 11/7/2019 12:21 PM      START taking these medications    Details   !! dicyclomine (BENTYL) 20 mg tablet Take 1 tablet (20 mg total) by mouth 2 (two) times a day for 5 days, Starting Thu 11/7/2019, Until Tue 11/12/2019, Print      metoclopramide (REGLAN) 10 mg tablet Take 1 tablet (10 mg total) by mouth every 6 (six) hours, Starting Thu 11/7/2019, Print       !! - Potential duplicate medications found  Please discuss with provider        CONTINUE these medications which have NOT CHANGED    Details   albuterol (VENTOLIN HFA) 90 mcg/act inhaler Inhale 2 puffs every 6 (six) hours as needed for wheezing, Starting Wed 12/12/2018, Normal      Alcohol Swabs (ALCOHOL PADS) 70 % PADS by Does not apply route 4 (four) times a day, Starting Fri 6/7/2019, Normal      amLODIPine (NORVASC) 5 mg tablet Take 1 tablet (5 mg total) by mouth daily, Starting Tue 1/15/2019, Normal      amphetamine-dextroamphetamine (ADDERALL XR) 20 MG 24 hr capsule Take 1 capsule (20 mg total) by mouth 2 (two) times a dayMax Daily Amount: 40 mg, Starting Mon 10/28/2019, Normal      Blood Glucose Monitoring Suppl (FREESTYLE LITE) DEIRDRE by Does not apply route daily, Starting Tue 5/21/2019, Normal      buPROPion (WELLBUTRIN XL) 150 mg 24 hr tablet bupropion HCl  mg 24 hr tablet, extended release, Historical Med      conjugated estrogens (PREMARIN) vaginal cream Insert 1 gram per vagina daily at bedtime x2 weeks, and then change to 1 gram twice weekly, Normal      cycloSPORINE (RESTASIS) 0 05 % ophthalmic emulsion Administer 1 drop to both eyes 2 (two) times a day, Historical Med      !! dicyclomine (BENTYL) 20 mg tablet Take 1 tablet (20 mg total) by mouth every 6 (six) hours as needed (pain), Starting Sun 10/7/2018, Print      Disposable Gloves MISC by Does not apply route 4 (four) times a day As needed for hygiene, Starting Mon 10/21/2019, Print      EASY COMFORT LANCETS MISC USE TO TEST THREE TIMES DAILY, Normal      famotidine (PEPCID) 20 mg tablet Take 1 tablet (20 mg total) by mouth 2 (two) times a day, Starting Wed 7/10/2019, Normal      fluconazole (DIFLUCAN) 150 mg tablet fluconazole 150 mg tablet, Historical Med      FREESTYLE LITE test strip TEST THREE TIMES A DAY AS DIRECTED, Normal      gabapentin (NEURONTIN) 400 mg capsule Starting Tue 7/23/2019, Historical Med      Humidifiers (COOL MIST HUMIDIFIER 1 GALLON) MISC by Does not apply route as needed (shortness of breath), Starting Tue 2/27/2018, Print      insulin aspart (NOVOLOG FLEXPEN) 100 Units/mL injection pen Inject 6 Units under the skin 3 (three) times a day with meals, Starting Tue 5/21/2019, Normal      Insulin Pen Needle (PEN NEEDLES) 31G X 8 MM MISC Inject 1 Stick under the skin 4 (four) times a day (with meals and at bedtime), Starting Wed 3/28/2018, Normal      lamoTRIgine (LaMICtal) 25 mg tablet lamotrigine 25 mg tablet, Historical Med      lidocaine (XYLOCAINE) 5 % ointment APPLY TWO GRAMS TWICE A DAY TOPICALLY AS NEEDED FOR MILD PAIN, Normal      montelukast (SINGULAIR) 10 mg tablet TAKE ONE TABLET BY MOUTH ONCE DAILY AT BEDTIME, Normal      MULTIPLE VITAMIN PO Take 1 capsule by mouth daily, Historical Med      ondansetron (ZOFRAN-ODT) 4 mg disintegrating tablet Take 1 tablet (4 mg total) by mouth every 8 (eight) hours as needed for nausea or vomiting, Starting Tue 10/22/2019, Print      oxyCODONE-acetaminophen (PERCOCET) 7 5-325 MG per tablet Take 1 tablet by mouth every 6 (six) hours as needed (pain)Max Daily Amount: 4 tablets, Starting Tue 10/29/2019, Normal      Probiotic Product (PROBIOTIC-10 PO) Take 1 tablet by mouth daily, Historical Med      QUEtiapine (SEROquel) 100 mg tablet Take 1 tablet (100 mg total) by mouth daily at bedtime, Starting Tue 5/7/2019, Normal      TiZANidine (ZANAFLEX) 4 MG capsule TAKE 1 CAPSULE (4 MG TOTAL) BY MOUTH THREE (THREE) TIMES A DAY, Normal      TOUJEO MAX SOLOSTAR 300 units/mL CONCETRATED U-300 injection pen INJECT 22 UNITS UNDER THE SKIN DAILY, Normal      Triamcinolone Acetonide 55 MCG/ACT AERO 1 Act (55 mcg total) into each nostril daily, Starting Tue 8/6/2019, Normal       !! - Potential duplicate medications found  Please discuss with provider  No discharge procedures on file      ED Provider  Electronically Signed by           Alec Ballard MD  11/07/19 2029

## 2019-11-11 ENCOUNTER — TRANSCRIBE ORDERS (OUTPATIENT)
Dept: ADMINISTRATIVE | Facility: HOSPITAL | Age: 49
End: 2019-11-11

## 2019-11-11 DIAGNOSIS — R10.13 EPIGASTRIC PAIN: ICD-10-CM

## 2019-11-11 DIAGNOSIS — K21.9 GASTROESOPHAGEAL REFLUX DISEASE WITHOUT ESOPHAGITIS: ICD-10-CM

## 2019-11-11 DIAGNOSIS — K29.70 GASTROESOPHAGITIS: ICD-10-CM

## 2019-11-11 DIAGNOSIS — R10.13 ABDOMINAL PAIN, EPIGASTRIC: ICD-10-CM

## 2019-11-11 DIAGNOSIS — K20.90 GASTROESOPHAGITIS: ICD-10-CM

## 2019-11-11 DIAGNOSIS — R63.4 LOSS OF WEIGHT: ICD-10-CM

## 2019-11-11 DIAGNOSIS — R11.2 NAUSEA AND VOMITING, INTRACTABILITY OF VOMITING NOT SPECIFIED, UNSPECIFIED VOMITING TYPE: ICD-10-CM

## 2019-11-11 DIAGNOSIS — K31.84 GASTROPARESIS: Primary | ICD-10-CM

## 2019-11-12 DIAGNOSIS — R29.898 WEAKNESS OF BOTH LOWER EXTREMITIES: ICD-10-CM

## 2019-11-12 DIAGNOSIS — B02.29 POSTHERPETIC NEURALGIA: Primary | ICD-10-CM

## 2019-11-12 DIAGNOSIS — G62.9 NEUROPATHY: ICD-10-CM

## 2019-11-12 RX ORDER — FAMOTIDINE 20 MG/1
TABLET, FILM COATED ORAL
Qty: 180 TABLET | Refills: 1 | Status: SHIPPED | OUTPATIENT
Start: 2019-11-12 | End: 2020-04-21

## 2019-11-12 NOTE — TELEPHONE ENCOUNTER
I had placed an order for PT  Not realizing she needs in home  Phone call made by you, [facility] is closed to in home PT    Can you please see what her options are    thanks

## 2019-11-12 NOTE — TELEPHONE ENCOUNTER
MSW obtained a list of in-network home health care agencies from the [employer] website using patient's plan type [employer]) and zip code ([phone])  MSW called the listed agencies to confirm which ones can accept PT only referrals  The options were:    [facility]  [address]    MSW phoned patient to offer choice of above  Patient stated that she had home PT before but did not know what agency, though [facility] sounded familiar  MSW offered choice  Patient selected [facility]  MSW made referral - faxed patient's demographics, neuro office visit note, copies of insurance cards, order, and med list to [facility] at [phone]  Successful fax notice received  MSW followed-up with Yesika Alvarado at [facility] at [phone] who confirmed referral was received  They will try to get patient opened for services tomorrow  They will call patient to schedule the visit  MSW will be available to patient as needed

## 2019-11-13 ENCOUNTER — TELEPHONE (OUTPATIENT)
Dept: FAMILY MEDICINE CLINIC | Facility: CLINIC | Age: 49
End: 2019-11-13

## 2019-11-13 NOTE — TELEPHONE ENCOUNTER
Physical therapist , Kady Cruz left message that she will see Elena Seats for 4 weeks, 2 times week   Plan for Posture, Improve balance, functional mobility  Will send report and  Papers to you  Any questions, she said to call her at [phone]

## 2019-11-14 ENCOUNTER — OFFICE VISIT (OUTPATIENT)
Dept: FAMILY MEDICINE CLINIC | Facility: CLINIC | Age: 49
End: 2019-11-14

## 2019-11-14 VITALS
TEMPERATURE: 96.6 F | SYSTOLIC BLOOD PRESSURE: 132 MMHG | RESPIRATION RATE: 18 BRPM | DIASTOLIC BLOOD PRESSURE: 90 MMHG | OXYGEN SATURATION: 97 % | HEART RATE: 79 BPM

## 2019-11-14 DIAGNOSIS — G47.411 CATAPLEXY: ICD-10-CM

## 2019-11-14 DIAGNOSIS — R29.898 WEAKNESS OF BOTH LOWER EXTREMITIES: Primary | ICD-10-CM

## 2019-11-14 DIAGNOSIS — G62.9 NEUROPATHY: ICD-10-CM

## 2019-11-14 PROCEDURE — 99213 OFFICE O/P EST LOW 20 MIN: CPT | Performed by: PHYSICIAN ASSISTANT
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
## 2019-11-19 DIAGNOSIS — F31.9 BIPOLAR 1 DISORDER (HCC): ICD-10-CM

## 2019-11-20 RX ORDER — DEXTROAMPHETAMINE SACCHARATE, AMPHETAMINE ASPARTATE MONOHYDRATE, DEXTROAMPHETAMINE SULFATE AND AMPHETAMINE SULFATE 5; 5; 5; 5 MG/1; MG/1; MG/1; MG/1
20 CAPSULE, EXTENDED RELEASE ORAL 2 TIMES DAILY
Qty: 60 CAPSULE | Refills: 0 | Status: SHIPPED | OUTPATIENT
Start: 2019-11-20 | End: 2019-12-23 | Stop reason: SDUPTHER

## 2019-11-21 ENCOUNTER — TELEPHONE (OUTPATIENT)
Dept: FAMILY MEDICINE CLINIC | Facility: CLINIC | Age: 49
End: 2019-11-21

## 2019-11-21 ENCOUNTER — HOSPITAL ENCOUNTER (OUTPATIENT)
Dept: RADIOLOGY | Facility: HOSPITAL | Age: 49
Discharge: HOME | End: 2019-11-21
Payer: COMMERCIAL

## 2019-11-21 DIAGNOSIS — K20.90 GASTROESOPHAGITIS: ICD-10-CM

## 2019-11-21 DIAGNOSIS — R11.2 NAUSEA AND VOMITING, INTRACTABILITY OF VOMITING NOT SPECIFIED, UNSPECIFIED VOMITING TYPE: ICD-10-CM

## 2019-11-21 DIAGNOSIS — K31.84 GASTROPARESIS: ICD-10-CM

## 2019-11-21 DIAGNOSIS — K21.9 GASTROESOPHAGEAL REFLUX DISEASE WITHOUT ESOPHAGITIS: ICD-10-CM

## 2019-11-21 DIAGNOSIS — R10.13 ABDOMINAL PAIN, EPIGASTRIC: ICD-10-CM

## 2019-11-21 DIAGNOSIS — K29.70 GASTROESOPHAGITIS: ICD-10-CM

## 2019-11-21 DIAGNOSIS — R63.4 LOSS OF WEIGHT: ICD-10-CM

## 2019-11-21 PROCEDURE — 78264 GASTRIC EMPTYING IMG STUDY: CPT

## 2019-11-21 PROCEDURE — A9541 TC99M SULFUR COLLOID: HCPCS

## 2019-11-22 ENCOUNTER — TELEPHONE (OUTPATIENT)
Dept: FAMILY MEDICINE CLINIC | Facility: CLINIC | Age: 49
End: 2019-11-22

## 2019-11-22 NOTE — TELEPHONE ENCOUNTER
SIGNATURES NEEDED FOR Plan of CareFORM RECEIVED VIA FAX  David Du PLACED IN YOUR BIN      [facility]

## 2019-11-25 DIAGNOSIS — M31.30 WEGENER'S GRANULOMATOSIS: Chronic | ICD-10-CM

## 2019-11-25 DIAGNOSIS — G89.4 CHRONIC PAIN SYNDROME: ICD-10-CM

## 2019-11-25 NOTE — TELEPHONE ENCOUNTER
SIGNATURES NEEDED FOR NUMTION  FORM RECEIVED VIA FAX  Eli Ashley PLACED IN YOUR BIN        ENCOUNTER NUMBER: [id number]

## 2019-11-26 ENCOUNTER — HOSPITAL ENCOUNTER (EMERGENCY)
Facility: HOSPITAL | Age: 49
Discharge: HOME | End: 2019-11-26
Attending: EMERGENCY MEDICINE | Admitting: EMERGENCY MEDICINE
Payer: COMMERCIAL

## 2019-11-26 VITALS
HEART RATE: 85 BPM | TEMPERATURE: 98.9 F | OXYGEN SATURATION: 100 % | RESPIRATION RATE: 14 BRPM | DIASTOLIC BLOOD PRESSURE: 85 MMHG | SYSTOLIC BLOOD PRESSURE: 131 MMHG

## 2019-11-26 DIAGNOSIS — R11.2 NAUSEA AND VOMITING: Primary | ICD-10-CM

## 2019-11-26 LAB
ALBUMIN SERPL BCP-MCNC: 3.6 G/DL (ref 3.5–5)
ALP SERPL-CCNC: 123 U/L (ref 46–116)
ALT SERPL W P-5'-P-CCNC: 23 U/L (ref 12–78)
ANION GAP SERPL CALCULATED.3IONS-SCNC: 11 MMOL/L (ref 4–13)
AST SERPL W P-5'-P-CCNC: 20 U/L (ref 5–45)
BASOPHILS # BLD AUTO: 0.01 THOUSANDS/ΜL (ref 0–0.1)
BASOPHILS NFR BLD AUTO: 0 % (ref 0–1)
BILIRUB SERPL-MCNC: 0.38 MG/DL (ref 0.2–1)
BUN SERPL-MCNC: 25 MG/DL (ref 5–25)
CALCIUM SERPL-MCNC: 9.1 MG/DL (ref 8.3–10.1)
CHLORIDE SERPL-SCNC: 107 MMOL/L (ref 100–108)
CO2 SERPL-SCNC: 22 MMOL/L (ref 21–32)
CREAT SERPL-MCNC: 2.1 MG/DL (ref 0.6–1.3)
EOSINOPHIL # BLD AUTO: 0.01 THOUSAND/ΜL (ref 0–0.61)
EOSINOPHIL NFR BLD AUTO: 0 % (ref 0–6)
ERYTHROCYTE [DISTWIDTH] IN BLOOD BY AUTOMATED COUNT: 13.2 % (ref 11.6–15.1)
GFR SERPL CREATININE-BSD FRML MDRD: 27 ML/MIN/1.73SQ M
GLUCOSE SERPL-MCNC: 120 MG/DL (ref 65–140)
HCT VFR BLD AUTO: 41.8 % (ref 34.8–46.1)
HGB BLD-MCNC: 13.4 G/DL (ref 11.5–15.4)
IMM GRANULOCYTES # BLD AUTO: 0.01 THOUSAND/UL (ref 0–0.2)
IMM GRANULOCYTES NFR BLD AUTO: 0 % (ref 0–2)
LIPASE SERPL-CCNC: 182 U/L (ref 73–393)
LYMPHOCYTES # BLD AUTO: 1.18 THOUSANDS/ΜL (ref 0.6–4.47)
LYMPHOCYTES NFR BLD AUTO: 28 % (ref 14–44)
MCH RBC QN AUTO: 30.7 PG (ref 26.8–34.3)
MCHC RBC AUTO-ENTMCNC: 32.1 G/DL (ref 31.4–37.4)
MCV RBC AUTO: 96 FL (ref 82–98)
MONOCYTES # BLD AUTO: 0.32 THOUSAND/ΜL (ref 0.17–1.22)
MONOCYTES NFR BLD AUTO: 8 % (ref 4–12)
NEUTROPHILS # BLD AUTO: 2.65 THOUSANDS/ΜL (ref 1.85–7.62)
NEUTS SEG NFR BLD AUTO: 64 % (ref 43–75)
NRBC BLD AUTO-RTO: 0 /100 WBCS
PLATELET # BLD AUTO: 269 THOUSANDS/UL (ref 149–390)
PMV BLD AUTO: 10 FL (ref 8.9–12.7)
POTASSIUM SERPL-SCNC: 4 MMOL/L (ref 3.5–5.3)
PROT SERPL-MCNC: 7.9 G/DL (ref 6.4–8.2)
RBC # BLD AUTO: 4.36 MILLION/UL (ref 3.81–5.12)
SODIUM SERPL-SCNC: 140 MMOL/L (ref 136–145)
WBC # BLD AUTO: 4.18 THOUSAND/UL (ref 4.31–10.16)

## 2019-11-26 PROCEDURE — 83690 ASSAY OF LIPASE: CPT | Performed by: FAMILY MEDICINE

## 2019-11-26 PROCEDURE — 96361 HYDRATE IV INFUSION ADD-ON: CPT

## 2019-11-26 PROCEDURE — 36415 COLL VENOUS BLD VENIPUNCTURE: CPT | Performed by: FAMILY MEDICINE

## 2019-11-26 PROCEDURE — 99284 EMERGENCY DEPT VISIT MOD MDM: CPT | Performed by: EMERGENCY MEDICINE

## 2019-11-26 PROCEDURE — 85025 COMPLETE CBC W/AUTO DIFF WBC: CPT | Performed by: FAMILY MEDICINE

## 2019-11-26 PROCEDURE — 80053 COMPREHEN METABOLIC PANEL: CPT | Performed by: FAMILY MEDICINE

## 2019-11-26 PROCEDURE — 99283 EMERGENCY DEPT VISIT LOW MDM: CPT

## 2019-11-26 PROCEDURE — 96374 THER/PROPH/DIAG INJ IV PUSH: CPT

## 2019-11-26 RX ORDER — METOCLOPRAMIDE HYDROCHLORIDE 5 MG/ML
10 INJECTION INTRAMUSCULAR; INTRAVENOUS ONCE
Status: COMPLETED | OUTPATIENT
Start: 2019-11-26 | End: 2019-11-26

## 2019-11-26 RX ORDER — SODIUM CHLORIDE 9 MG/ML
125 INJECTION, SOLUTION INTRAVENOUS CONTINUOUS
Status: DISCONTINUED | OUTPATIENT
Start: 2019-11-26 | End: 2019-11-26

## 2019-11-26 RX ORDER — OXYCODONE AND ACETAMINOPHEN 7.5; 325 MG/1; MG/1
1 TABLET ORAL EVERY 6 HOURS PRN
Qty: 120 TABLET | Refills: 0 | Status: SHIPPED | OUTPATIENT
Start: 2019-11-26 | End: 2019-12-23 | Stop reason: SDUPTHER

## 2019-11-26 RX ADMIN — METOCLOPRAMIDE 10 MG: 5 INJECTION, SOLUTION INTRAMUSCULAR; INTRAVENOUS at 11:04

## 2019-11-26 RX ADMIN — SODIUM CHLORIDE 1000 ML: 0.9 INJECTION, SOLUTION INTRAVENOUS at 10:57

## 2019-11-26 NOTE — DISCHARGE INSTRUCTIONS
Please continue to hydrate yourself as much as you can tolerate  Eat small meals  Follow up with your PCP in 2-3 days and please keep your appointment with Gastroenterology tomorrow

## 2019-11-26 NOTE — ED ATTENDING ATTESTATION
11/26/2019  IBryanna DO, saw and evaluated the patient  I have discussed the patient with the resident/non-physician practitioner and agree with the resident's/non-physician practitioner's findings, Plan of Care, and MDM as documented in the resident's/non-physician practitioner's note, except where noted  All available labs and Radiology studies were reviewed  I was present for key portions of any procedure(s) performed by the resident/non-physician practitioner and I was immediately available to provide assistance  At this point I agree with the current assessment done in the Emergency Department  I have conducted an independent evaluation of this patient a history and physical is as follows:    ED Course         Critical Care Time  Procedures    49-year-old female with a history of gastroparesis presents with a one-week history of nausea and vomiting  Patient states she is unable to eat  She had a recent gastric emptying study which did show delayed emptying  She has been on Reglan without relief at home  She does complain of some mild epigastric pain  No fevers  She has an appointment tomorrow with Gastroenterology for possible pacemaker to her stomach to help gastric emptying  On exam she appears well in no distress on the phone  She does not appear clinically to be dehydrated  Mucous membranes are moist   Heart is regular without murmur  Lungs are clear  Abdomen is obese, soft with mild epigastric tenderness without peritoneal signs  Skin is warm and dry, normal color no rash  Will check CBC, CMP, lipase rule out electrolyte abnormality, pancreatitis  Will give IV fluids and IV Reglan    If all labs are within normal limits, patient should follow up with her gastroenterologist tomorrow for treatment of her gastroparesis

## 2019-11-26 NOTE — ED PROVIDER NOTES
History  Chief Complaint   Patient presents with    Vomiting     Reports decreased appetite and vomiting x "weeks" has an appointment with GI tomorrow but was unable to wait  Abdominal discomfort  51 yo F with a PMH significant for chronic gastritis, gastroparesis, Wegener's granulomatosis presents with a 3 week h/o nausea, vomiting, decreased appetite, and epigastric abdominal pain  She has been seen in the ED multiple times for similar complaints  She had a gastric emptying study done on 11/21 which showed a decreased rate of emptying  She states that she has an appointment with her GI doctor for follow-up of this and a possibile placement of gastric stimulator  She reports that she has not been able to eat anything for this period of time  She reports 3 bites of banana yesterday which she subsequently threw up a few hours later  She says that she had a few sips of Gatorade and water  She reports her epigastric pain to be constant, with no relief  States it feels like a gnawing, but denies radiation  She denies fever, shortness of breath, dyspnea, chest pain, weakness  History provided by:  Patient   used: No    Vomiting   Severity:  Moderate  Duration:  3 weeks  Timing:  Intermittent  Quality:  Stomach contents  Able to tolerate:  Liquids  Progression:  Unchanged  Chronicity:  Chronic  Recent urination:  Normal  Associated symptoms: abdominal pain (Epigastric), chills, headaches and sore throat    Associated symptoms: no cough, no diarrhea and no fever    Risk factors: diabetes (gastroparesis)        Prior to Admission Medications   Prescriptions Last Dose Informant Patient Reported? Taking?    Alcohol Swabs (ALCOHOL PADS) 70 % PADS  Self No No   Sig: by Does not apply route 4 (four) times a day   Blood Glucose Monitoring Suppl (FREESTYLE LITE) DEIRDRE  Self No No   Sig: by Does not apply route daily   Disposable Gloves MISC   No No   Sig: by Does not apply route 4 (four) times a day As needed for hygiene   EASY COMFORT LANCETS MISC   No No   Sig: USE TO TEST THREE TIMES DAILY   FREESTYLE LITE test strip  Self No No   Sig: TEST THREE TIMES A DAY AS DIRECTED   Humidifiers (COOL MIST HUMIDIFIER 1 GALLON) MISC  Self No No   Sig: by Does not apply route as needed (shortness of breath)   Insulin Pen Needle (PEN NEEDLES) 31G X 8 MM MISC  Self No No   Sig: Inject 1 Stick under the skin 4 (four) times a day (with meals and at bedtime)   MULTIPLE VITAMIN PO  Self Yes No   Sig: Take 1 capsule by mouth daily   Probiotic Product (PROBIOTIC-10 PO)  Self Yes No   Sig: Take 1 tablet by mouth daily   QUEtiapine (SEROquel) 100 mg tablet  Self No No   Sig: Take 1 tablet (100 mg total) by mouth daily at bedtime   TOUJEO MAX SOLOSTAR 300 units/mL CONCETRATED U-300 injection pen  Self No No   Sig: INJECT 22 UNITS UNDER THE SKIN DAILY   TiZANidine (ZANAFLEX) 4 MG capsule  Self No No   Sig: TAKE 1 CAPSULE (4 MG TOTAL) BY MOUTH THREE (THREE) TIMES A DAY   Triamcinolone Acetonide 55 MCG/ACT AERO  Self No No   Si Act (55 mcg total) into each nostril daily   albuterol (VENTOLIN HFA) 90 mcg/act inhaler  Self No No   Sig: Inhale 2 puffs every 6 (six) hours as needed for wheezing   amLODIPine (NORVASC) 5 mg tablet  Self No No   Sig: Take 1 tablet (5 mg total) by mouth daily   amphetamine-dextroamphetamine (ADDERALL XR) 20 MG 24 hr capsule   No No   Sig: Take 1 capsule (20 mg total) by mouth 2 (two) times a dayMax Daily Amount: 40 mg   buPROPion (WELLBUTRIN XL) 150 mg 24 hr tablet  Self Yes No   Sig: bupropion HCl  mg 24 hr tablet, extended release   conjugated estrogens (PREMARIN) vaginal cream  Self No No   Sig: Insert 1 gram per vagina daily at bedtime x2 weeks, and then change to 1 gram twice weekly   cycloSPORINE (RESTASIS) 0 05 % ophthalmic emulsion  Self Yes No   Sig: Administer 1 drop to both eyes 2 (two) times a day   dicyclomine (BENTYL) 20 mg tablet  Self No No   Sig: Take 1 tablet (20 mg total) by mouth every 6 (six) hours as needed (pain)   dicyclomine (BENTYL) 20 mg tablet   No No   Sig: Take 1 tablet (20 mg total) by mouth 2 (two) times a day for 5 days   famotidine (PEPCID) 20 mg tablet   No No   Sig: TAKE ONE TABLET BY MOUTH TWICE A DAY   fluconazole (DIFLUCAN) 150 mg tablet  Self Yes No   Sig: fluconazole 150 mg tablet   gabapentin (NEURONTIN) 400 mg capsule  Self No No   Sig: Take 1 capsule (400 mg total) by mouth 3 (three) times a day for 30 days   gabapentin (NEURONTIN) 400 mg capsule  Self Yes No   insulin aspart (NOVOLOG FLEXPEN) 100 Units/mL injection pen  Self No No   Sig: Inject 6 Units under the skin 3 (three) times a day with meals   lamoTRIgine (LaMICtal) 25 mg tablet  Self Yes No   Sig: lamotrigine 25 mg tablet   lidocaine (XYLOCAINE) 5 % ointment  Self No No   Sig: APPLY TWO GRAMS TWICE A DAY TOPICALLY AS NEEDED FOR MILD PAIN   metoclopramide (REGLAN) 10 mg tablet   No No   Sig: Take 1 tablet (10 mg total) by mouth every 6 (six) hours   montelukast (SINGULAIR) 10 mg tablet  Self No No   Sig: TAKE ONE TABLET BY MOUTH ONCE DAILY AT BEDTIME   ondansetron (ZOFRAN-ODT) 4 mg disintegrating tablet   No No   Sig: Take 1 tablet (4 mg total) by mouth every 8 (eight) hours as needed for nausea or vomiting   oxyCODONE-acetaminophen (PERCOCET) 7 5-325 MG per tablet   No No   Sig: Take 1 tablet by mouth every 6 (six) hours as needed (pain)Max Daily Amount: 4 tablets      Facility-Administered Medications: None       Past Medical History:   Diagnosis Date    ADHD     Anemia of chronic disease     Anxiety     Asthma     Asthma     Borderline personality disorder (HCC)     Cataplexy     Chronic abdominal pain     CKD (chronic kidney disease) stage 3, GFR 30-59 ml/min (HCC)     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HCC)     Cushing syndrome (HCC)     Diabetes mellitus ([facility] [address] )     DVT (deep venous thrombosis) (HCC)     History of acute pancreatitis     felt secondary to Bactrim    HTN (hypertension)     Hypertension  Liver disease     fatty liver    Microscopic polyangiitis (HCC)     Morbid obesity (HCC)     MPA (microscopic polyangiitis) (HCC)     Ovarian cyst     PTSD (post-traumatic stress disorder)     Renal disorder     Self-inflicted injury     self inflicted skin wounds    Wegener's granulomatosis with renal involvement ([facility] [address] )        Past Surgical History:   Procedure Laterality Date    ESOPHAGOGASTRODUODENOSCOPY  2015    mild antral gastritis    [place] COLONOSCOPY FLX DX W/COLLJ SPEC WHEN PFRMD N/A 2018    Procedure: COLONOSCOPY with polypectomy;  Surgeon: Mckenna Gutierrez MD;  Location: [facility]; Service: Gastroenterology    [place] ESOPHAGOGASTRODUODENOSCOPY TRANSORAL DIAGNOSTIC N/A 2018    Procedure: ESOPHAGOGASTRODUODENOSCOPY (EGD) with biopsy;  Surgeon: Mckenna Gutierrez MD;  Location: [facility]; Service: Gastroenterology    RELEASE SCAR CONTRACTURE / GRAFT REPAIRS OF HAND         Family History   Problem Relation Age of Onset    No Known Problems Mother     No Known Problems Father     Colon cancer Neg Hx     Drug abuse Neg Hx         mother father    Mental illness Neg Hx         disorder, mother father    Cancer Neg Hx     Breast cancer Neg Hx      I have reviewed and agree with the history as documented  Social History     Tobacco Use    Smoking status: Former Smoker     Last attempt to quit: 2011     Years since quittin 9    Smokeless tobacco: Never Used    Tobacco comment: marijuana   Substance Use Topics    Alcohol use: No     Frequency: Never    Drug use: Yes     Types: Marijuana     Comment: marijuana daily        Review of Systems   Constitutional: Positive for appetite change, chills and fatigue  Negative for activity change, diaphoresis, fever and unexpected weight change  HENT: Positive for rhinorrhea and sore throat  Negative for ear pain and trouble swallowing  Respiratory: Negative for cough and shortness of breath      Cardiovascular: Negative for chest pain, palpitations and leg swelling  Gastrointestinal: Positive for abdominal pain (Epigastric), nausea and vomiting  Negative for constipation and diarrhea  Genitourinary: Negative for decreased urine volume, difficulty urinating, dysuria and hematuria  Skin: Negative for color change, pallor and rash  Neurological: Positive for headaches  Negative for dizziness, weakness, light-headedness and numbness  All other systems reviewed and are negative  Physical Exam  ED Triage Vitals [11/26/19 1023]   Temperature Pulse Respirations Blood Pressure SpO2   98 9 °F (37 2 °C) (!) 114 18 137/87 95 %      Temp Source Heart Rate Source Patient Position - Orthostatic VS BP Location FiO2 (%)   Temporal Monitor Sitting Right arm --      Pain Score       6             Orthostatic Vital Signs  Vitals:    11/26/19 1023 11/26/19 1151   BP: 137/87 119/74   Pulse: (!) 114 85   Patient Position - Orthostatic VS: Sitting        Physical Exam   Constitutional: She appears well-developed and well-nourished  No distress  HENT:   Head: Normocephalic and atraumatic  Nose: Nose normal    Mouth/Throat: Oropharynx is clear and moist    Eyes: Conjunctivae are normal    Neck: Normal range of motion  Cardiovascular: Regular rhythm, normal heart sounds and intact distal pulses  Tachycardic   Pulmonary/Chest: Effort normal and breath sounds normal  No respiratory distress  She exhibits no tenderness  Abdominal: Soft  There is tenderness (Epigastric)  Musculoskeletal: Normal range of motion  She exhibits no edema or deformity  Neurological: She is alert  Skin: Skin is warm  Capillary refill takes less than 2 seconds  She is not diaphoretic  Psychiatric: She has a normal mood and affect         ED Medications  Medications   sodium chloride 0 9 % bolus 1,000 mL (1,000 mL Intravenous New Bag 11/26/19 1057)   metoclopramide (REGLAN) injection 10 mg (10 mg Intravenous Given 11/26/19 1104)       Diagnostic Studies  Results Reviewed     Procedure Component Value Units Date/Time    Comprehensive metabolic panel [[id number]]  (Abnormal) Collected:  11/26/19 1057    Lab Status:  Final result Specimen:  Blood from Hand, Right Updated:  11/26/19 1120     Sodium 140 mmol/L      Potassium 4 0 mmol/L      Chloride 107 mmol/L      CO2 22 mmol/L      ANION GAP 11 mmol/L      BUN 25 mg/dL      Creatinine 2 10 mg/dL      Glucose 120 mg/dL      Calcium 9 1 mg/dL      AST 20 U/L      ALT 23 U/L      Alkaline Phosphatase 123 U/L      Total Protein 7 9 g/dL      Albumin 3 6 g/dL      Total Bilirubin 0 38 mg/dL      eGFR 27 ml/min/1 73sq m     Narrative:       National Kidney Disease Foundation guidelines for Chronic Kidney Disease (CKD):     Stage 1 with normal or high GFR (GFR > 90 mL/min/1 73 square meters)    Stage 2 Mild CKD (GFR = 60-89 mL/min/1 73 square meters)    Stage 3A Moderate CKD (GFR = 45-59 mL/min/1 73 square meters)    Stage 3B Moderate CKD (GFR = 30-44 mL/min/1 73 square meters)    Stage 4 Severe CKD (GFR = 15-29 mL/min/1 73 square meters)    Stage 5 End Stage CKD (GFR <15 mL/min/1 73 square meters)  Note: GFR calculation is accurate only with a steady state creatinine    Lipase [[id number]]  (Normal) Collected:  11/26/19 1057    Lab Status:  Final result Specimen:  Blood from Hand, Right Updated:  11/26/19 1120     Lipase 182 u/L     CBC and differential [[id number]]  (Abnormal) Collected:  11/26/19 1057    Lab Status:  Final result Specimen:  Blood from Hand, Right Updated:  11/26/19 1105     WBC 4 18 Thousand/uL      RBC 4 36 Million/uL      Hemoglobin 13 4 g/dL      Hematocrit 41 8 %      MCV 96 fL      MCH 30 7 pg      MCHC 32 1 g/dL      RDW 13 2 %      MPV 10 0 fL      Platelets 927 Thousands/uL      nRBC 0 /100 WBCs      Neutrophils Relative 64 %      Immat GRANS % 0 %      Lymphocytes Relative 28 %      Monocytes Relative 8 %      Eosinophils Relative 0 %      Basophils Relative 0 %      Neutrophils Absolute 2 65 Thousands/µL      Immature Grans Absolute 0 01 Thousand/uL      Lymphocytes Absolute 1 18 Thousands/µL      Monocytes Absolute 0 32 Thousand/µL      Eosinophils Absolute 0 01 Thousand/µL      Basophils Absolute 0 01 Thousands/µL                  No orders to display         Procedures  Procedures        ED Course  ED Course as of Nov 26 1242   Tue Nov 26, 2019   1045 Evaluated patient at bedside  Discussed with Dr Eliezer Jhaveri                                  [facility]  Number of Diagnoses or Management Options  Nausea and vomiting:   Diagnosis management comments: Nausea, vomiting, abdominal pain  Possible differentials include gastritis, gastroparesis, pancreatitis  Patient does not appear overly dehydrated or in distress  Will check blood work and provide symptomatic relief with Reglan and IV fluid bolus  Patient improved after IVF and reglan  Was able to tolerate crackers without recurrence of symptoms  Return precautions discussed with patient and advised her to keep all follow-up appointments with PCP and GI  Disposition  Final diagnoses:   Nausea and vomiting     Time reflects when diagnosis was documented in both MDM as applicable and the Disposition within this note     Time User Action Codes Description Comment    11/26/2019 12:35 PM Elvira Neris2 Regan Ricardo [R11 2] Nausea and vomiting       ED Disposition     ED Disposition Condition Date/Time Comment    Discharge Stable Tue Nov 26, 2019 12:35 PM Karmen Anderson discharge to home/self care  Follow-up Information     Follow up With Specialties Details Why Contact Info    David Lee PA-C Family Medicine, Physician Assistant In 3 days  [address]  [address]  [phone]            Patient's Medications   Discharge Prescriptions    No medications on file     No discharge procedures on file  ED Provider  Attending physically available and evaluated Karmen Anderson   I managed the patient along with the ED Attending      Electronically Signed by         Jhonny Severe, MD  11/26/19 4513

## 2019-11-26 NOTE — TELEPHONE ENCOUNTER
Received form [facility] ([address]) Faxed to the following number [phone] I did receive confirmation

## 2019-12-03 ENCOUNTER — TELEPHONE (OUTPATIENT)
Dept: FAMILY MEDICINE CLINIC | Facility: CLINIC | Age: 49
End: 2019-12-03

## 2019-12-03 NOTE — TELEPHONE ENCOUNTER
Pt states she's leaving another message regarding diagnosis papers she needs for her medical marijuana card  I don't see a recent message but contacted her and asked if all she needed was a list of her medical diagnoses  She said yes  She requested I mail it to her  Printed her problem list and mailed out to her

## 2019-12-06 ENCOUNTER — TELEPHONE (OUTPATIENT)
Dept: GASTROENTEROLOGY | Facility: AMBULARY SURGERY CENTER | Age: 49
End: 2019-12-06

## 2019-12-06 ENCOUNTER — TELEPHONE (OUTPATIENT)
Dept: GASTROENTEROLOGY | Facility: MEDICAL CENTER | Age: 49
End: 2019-12-06

## 2019-12-06 ENCOUNTER — OFFICE VISIT (OUTPATIENT)
Dept: GASTROENTEROLOGY | Facility: MEDICAL CENTER | Age: 49
End: 2019-12-06
Payer: COMMERCIAL

## 2019-12-06 VITALS
TEMPERATURE: 96.9 F | HEART RATE: 87 BPM | SYSTOLIC BLOOD PRESSURE: 122 MMHG | BODY MASS INDEX: 39.44 KG/M2 | HEIGHT: 62 IN | DIASTOLIC BLOOD PRESSURE: 74 MMHG

## 2019-12-06 DIAGNOSIS — R11.2 NAUSEA AND VOMITING, INTRACTABILITY OF VOMITING NOT SPECIFIED, UNSPECIFIED VOMITING TYPE: Primary | ICD-10-CM

## 2019-12-06 DIAGNOSIS — K31.84 GASTROPARESIS: Primary | ICD-10-CM

## 2019-12-06 DIAGNOSIS — K21.9 GASTROESOPHAGEAL REFLUX DISEASE WITHOUT ESOPHAGITIS: ICD-10-CM

## 2019-12-06 DIAGNOSIS — K31.84 GASTROPARESIS: ICD-10-CM

## 2019-12-06 PROCEDURE — 99214 OFFICE O/P EST MOD 30 MIN: CPT | Performed by: PHYSICIAN ASSISTANT

## 2019-12-06 RX ORDER — SCOLOPAMINE TRANSDERMAL SYSTEM 1 MG/1
1 PATCH, EXTENDED RELEASE TRANSDERMAL
Qty: 10 PATCH | Refills: 3 | Status: SHIPPED | OUTPATIENT
Start: 2019-12-06 | End: 2020-03-17 | Stop reason: SDUPTHER

## 2019-12-06 RX ORDER — ESOMEPRAZOLE MAGNESIUM 40 MG/1
40 CAPSULE, DELAYED RELEASE ORAL
Qty: 60 CAPSULE | Refills: 3 | Status: SHIPPED | OUTPATIENT
Start: 2019-12-06 | End: 2019-12-26 | Stop reason: ALTCHOICE

## 2019-12-06 NOTE — PROGRESS NOTES
[facility] - Outpatient Follow-up Note  Natalie Brown 50 y o  female MRN: [id number]  Encounter: [id number]          ASSESSMENT AND PLAN:      1  Nausea and vomiting, intractability of vomiting not specified, unspecified vomiting type:  2  Gastroparesis: GES 11/21 revealed delayed rate of gastric emptying  She has been to the ED multiple times with intractable vomiting  She states she gets relief about 3 days between attacks  She has tried zofran, phenergan, reglan without relief-would recommend stopping these  We can try scopolamine and plan for EGD with botox injection  We discussed this may not work or may only provide temporary relief  She understands and would like to try this  Patient is also interested in referral to physician that does gastric pacemakers, will refer to dr Roselyn Noel  - scopolamine (TRANSDERM-SCOP) 1 5 mg/3 days TD 72 hr patch; Place 1 patch on the skin every third day  Dispense: 10 patch; Refill: 3  - EGD Botox; Future  -small frequent meals  -follow-up after EGD    3  Gastroesophageal reflux disease without esophagitis:  She admits to acid reflux  She has been taking both Prilosec and Protonix  We will switch this to Nexium twice daily, she may continue to use Pepcid as needed  If no relief could consider Dexilant  - esomeprazole (NexIUM) 40 MG capsule; Take 1 capsule (40 mg total) by mouth 2 (two) times a day before meals  Dispense: 60 capsule; Refill: 3    ______________________________________________________________________    SUBJECTIVE:  Natalie Brown is a 49 yo female with a past medical history of pancreatitis, GERD, irritable bowel syndrome, type 2 diabetes, Wegener's granulomatosis who is here for follow-up of gastroparesis  She has been having chronic nausea and vomiting  She underwent a gastric emptying study 11/21/2019 that revealed delayed gastric emptying    She states that she has about 3 days between flare ups of gastroparesis where she is able to eat  Then she experiences about 5 days of intractable nausea and vomiting and abdominal pain  She has tried Zofran, Phenergan, Reglan all without relief  She was told that her insurance would unlikely cover a gastric pacemaker  She is interested in discussing Botox injection  We did discuss that this may be of temporary relief or may not provide her with any relief  She understands and would like to schedule EGD anyway  She is interested [facility] being [facility] [place]  She has never tried scopolamine  She underwent an EGD and colonoscopy in December of 2018 that revealed gastritis with negative biopsies and 1 polyp that was removed that was a tubular adenoma  Repeat colonoscopy was recommended in 5 years  REVIEW OF SYSTEMS IS OTHERWISE NEGATIVE  Historical Information   Past Medical History:   Diagnosis Date    ADHD     Anemia of chronic disease     Anxiety     Asthma     Asthma     [facility]line personality disorder [facility]     Cataplexy     Chronic abdominal pain     CKD (chronic kidney disease) stage 3, GFR 30-59 ml/min (HCC)     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[facility]     Cushing syndrome ([facility]     Diabetes mellitus ([facility]     DVT (deep venous thrombosis) ([facility]     History of acute pancreatitis     felt secondary to Bactrim    HTN (hypertension)     Hypertension     Liver disease     fatty liver    Microscopic polyangiitis (HCC)     Morbid obesity (HCC)     MPA (microscopic polyangiitis) (HCC)     Ovarian cyst     PTSD (post-traumatic stress disorder)     Renal disorder     Self-inflicted injury     self inflicted skin wounds    Wegener's granulomatosis with renal involvement ([facility] ) 2015     Past Surgical History:   Procedure Laterality Date    ESOPHAGOGASTRODUODENOSCOPY  09/11/2015    mild antral gastritis    PA COLONOSCOPY FLX DX W/COLLJ SPEC WHEN PFRMD N/A 12/14/2018    Procedure: COLONOSCOPY with polypectomy;  Surgeon: Jesse Coreas MD;  Location: [facility];   Service: Gastroenterology    KS ESOPHAGOGASTRODUODENOSCOPY TRANSORAL DIAGNOSTIC N/A 2018    Procedure: ESOPHAGOGASTRODUODENOSCOPY (EGD) with biopsy;  Surgeon: Dennis Pardo MD;  Location: [facility];   Service: Gastroenterology    RELEASE SCAR CONTRACTURE / GRAFT REPAIRS OF HAND       Social History   Social History     Substance and Sexual Activity   Alcohol Use No    Frequency: Never     Social History     Substance and Sexual Activity   Drug Use Yes    Types: Marijuana    Comment: marijuana daily     Social History     Tobacco Use   Smoking Status Former Smoker    Last attempt to quit:     Years since quittin 9   Smokeless Tobacco Never Used   Tobacco Comment    marijuana     Family History   Problem Relation Age of Onset    No Known Problems Mother     No Known Problems Father     Colon cancer Neg Hx     Drug abuse Neg Hx         mother father    Mental illness Neg Hx         disorder, mother father    Cancer Neg Hx     Breast cancer Neg Hx        Meds/Allergies       Current Outpatient Medications:     albuterol (VENTOLIN HFA) 90 mcg/act inhaler    Alcohol Swabs (ALCOHOL PADS) 70 % PADS    amLODIPine (NORVASC) 5 mg tablet    amphetamine-dextroamphetamine (ADDERALL XR) 20 MG 24 hr capsule    Blood Glucose Monitoring Suppl (FREESTYLE LITE) DEIRDRE    buPROPion (WELLBUTRIN XL) 150 mg 24 hr tablet    conjugated estrogens (PREMARIN) vaginal cream    cycloSPORINE (RESTASIS) 0 05 % ophthalmic emulsion    dicyclomine (BENTYL) 20 mg tablet    Disposable Gloves MISC    EASY COMFORT LANCETS MISC    famotidine (PEPCID) 20 mg tablet    fluconazole (DIFLUCAN) 150 mg tablet    FREESTYLE LITE test strip    gabapentin (NEURONTIN) 400 mg capsule    Humidifiers (COOL MIST HUMIDIFIER 1 GALLON) MISC    insulin aspart (NOVOLOG FLEXPEN) 100 Units/mL injection pen    Insulin Pen Needle (PEN NEEDLES) 31G X 8 MM MISC    lamoTRIgine (LaMICtal) 25 mg tablet    MULTIPLE VITAMIN PO    ondansetron (ZOFRAN-ODT) 4 mg disintegrating tablet    oxyCODONE-acetaminophen (PERCOCET) 7 5-325 MG per tablet    Probiotic Product (PROBIOTIC-10 PO)    QUEtiapine (SEROquel) 100 mg tablet    TiZANidine (ZANAFLEX) 4 MG capsule    TOUJEO MAX SOLOSTAR 300 units/mL CONCETRATED U-300 injection pen    Triamcinolone Acetonide 55 MCG/ACT AERO    dicyclomine (BENTYL) 20 mg tablet    esomeprazole (NexIUM) 40 MG capsule    gabapentin (NEURONTIN) 400 mg capsule    lidocaine (XYLOCAINE) 5 % ointment    metoclopramide (REGLAN) 10 mg tablet    montelukast (SINGULAIR) 10 mg tablet    scopolamine (TRANSDERM-SCOP) 1 5 mg/3 days TD 72 hr patch    Allergies   Allergen Reactions    Prozac [Fluoxetine Hcl]      SI    Bactrim [Sulfamethoxazole-Trimethoprim]      Pt "They think that is what cause the pancreatitis"     Lithium Other (See Comments)    Amlodipine Hives    Haldol [Haloperidol] Other (See Comments)     "I don't like it"    Ibuprofen     Lexapro [Escitalopram Oxalate] Rash    Navane [Thiothixene]      SI    Other      "novaine?" antipsychotic           Objective     Blood pressure 122/74, pulse 87, temperature (!) 96 9 °F (36 1 °C), temperature source Tympanic, height 5' 2" (1 575 m), not currently breastfeeding  Body mass index is 39 44 kg/m²  PHYSICAL EXAM:      General Appearance:   Alert, cooperative, no distress   HEENT:   Normocephalic, atraumatic, anicteric  Right eye exhibits no discharge  Left eye exhibits no discharge  No scleral icterus     Neck:  Supple, symmetrical, trachea midline, no stridor    Lungs:   Clear to auscultation bilaterally; no rales, rhonchi or wheezing; respirations unlabored    Heart[de-identified]   Regular rate and rhythm; no murmur, rub, or gallop     Abdomen:   Soft, non-tender, non-distended; normal bowel sounds; no masses, no organomegaly    Genitalia:   Deferred    Rectal:   Deferred    Extremities:  No cyanosis, clubbing or edema        Skin:  No jaundice, rashes, or lesions          Lab Results:   No visits with results within 1 Day(s) from this visit     Latest known visit with results is:   Admission on 11/26/2019, Discharged on 11/26/2019   Component Date Value    WBC 11/26/2019 4 18*    RBC 11/26/2019 4 36     Hemoglobin 11/26/2019 13 4     Hematocrit 11/26/2019 41 8     MCV 11/26/2019 96     MCH 11/26/2019 30 7     MCHC 11/26/2019 32 1     RDW 11/26/2019 13 2     MPV 11/26/2019 10 0     Platelets 61/88/5361 269     nRBC 11/26/2019 0     Neutrophils Relative 11/26/2019 64     Immat GRANS % 11/26/2019 0     Lymphocytes Relative 11/26/2019 28     Monocytes Relative 11/26/2019 8     Eosinophils Relative 11/26/2019 0     Basophils Relative 11/26/2019 0     Neutrophils Absolute 11/26/2019 2 65     Immature Grans Absolute 11/26/2019 0 01     Lymphocytes Absolute 11/26/2019 1 18     Monocytes Absolute 11/26/2019 0 32     Eosinophils Absolute 11/26/2019 0 01     Basophils Absolute 11/26/2019 0 01     Sodium 11/26/2019 140     Potassium 11/26/2019 4 0     Chloride 11/26/2019 107     CO2 11/26/2019 22     ANION GAP 11/26/2019 11     BUN 11/26/2019 25     Creatinine 11/26/2019 2 10*    Glucose 11/26/2019 120     Calcium 11/26/2019 9 1     AST 11/26/2019 20     ALT 11/26/2019 23     Alkaline Phosphatase 11/26/2019 123*    Total Protein 11/26/2019 7 9     Albumin 11/26/2019 3 6     Total Bilirubin 11/26/2019 0 38     eGFR 11/26/2019 27     Lipase 11/26/2019 182          Radiology Results:   Nm Gastric Emptying    Result Date: 11/21/2019  Narrative: GASTRIC EMPTYING STUDY INDICATION: K31 84: Gastroparesis R11 2: Nausea with vomiting, unspecified K21 9: Gastro-esophageal reflux disease without esophagitis R10 13: Epigastric pain K29 70: Gastritis, unspecified, without bleeding K20 9: Esophagitis, unspecified R63 4: Abnormal weight loss COMPARISON:  None available TECHNIQUE:   The study was performed following the oral administration of 1 03 mCi Tc-99m sulfur colloid combined with scrambled eggs, as part of a standard meal   Following the meal, one minute anterior and posterior images were obtained immediately and  at 0 25 hours, 0 5 hour, 1 hour, 1 5 hour, 2 hour, 3 hour and 4 hour intervals from the time of ingestion  Geometric mean analyses were then performed  As of March 1, 2016, this gastric emptying protocol has been modified and updated  The gastric emptying times and the normal values reported below reflect the change in protocol  FINDINGS: Gastric emptying at 0 5 hours = 11 (N < 30%) Gastric emptying at 1 hour = 20 % (N = 10 - 70%) Gastric emptying at 2 hours = 41 % (N = > 40%) Gastric emptying at 3 hours = 62 % (N = > 70%) Gastric emptying at 4 hours = 78 % (N = >90%) Linear T-1/2 = 146 minutes Gastric emptying is delayed at the 3 and 4 hour time points  Impression: Delayed rate of gastric emptying

## 2019-12-06 NOTE — TELEPHONE ENCOUNTER
Referral for [facility] for [facility] ready to be signed  Thank you  Patient aware referral has been sent to [facility]

## 2019-12-06 NOTE — TELEPHONE ENCOUNTER
Good Morning,  I have a referral to [facility] for gastroparesis  Thank you so much    Cem Chaparro  Female, 50 y  o , 1970  Phone:   [phone]  MRN:   [id number]   [facility] REP/[employer] MEDICARE ASSURED [facility] REP  [place]ICAL [place]     She saw Ilan Sanford in the office today  Pt sees DR Iggy Venegas  She is having an EGD with Botox on 12/26/19 at [facility] with Dr Jesenia Stewart due to availability       Thank you,   Agata Bernal

## 2019-12-06 NOTE — PATIENT INSTRUCTIONS
The patient is scheduled for an EGD with Botox at [facility] with Dr James Cisse on 12/26/19  All instructions were gone over with the patient in the office  The patient is aware she will need a  and that she will be called the day prior with an arrival time  The patient asked to schedule this ASAP and Dr Brynn Tran had no availability  I was able to schedule on 12/26/19 at [place] [address]  I sent Catie Baxter RN an email regarding this

## 2019-12-09 DIAGNOSIS — K31.84 GASTROPARESIS: Primary | ICD-10-CM

## 2019-12-10 ENCOUNTER — TRANSCRIBE ORDERS (OUTPATIENT)
Dept: GASTROENTEROLOGY | Facility: CLINIC | Age: 49
End: 2019-12-10

## 2019-12-10 DIAGNOSIS — G89.4 CHRONIC PAIN SYNDROME: ICD-10-CM

## 2019-12-11 RX ORDER — LIDOCAINE 50 MG/G
OINTMENT TOPICAL 2 TIMES DAILY PRN
Qty: 250 G | Refills: 1 | Status: SHIPPED | OUTPATIENT
Start: 2019-12-11 | End: 2020-12-10

## 2019-12-12 ENCOUNTER — TELEPHONE (OUTPATIENT)
Dept: FAMILY MEDICINE CLINIC | Facility: CLINIC | Age: 49
End: 2019-12-12

## 2019-12-12 NOTE — TELEPHONE ENCOUNTER
SIGNATURES NEEDED FOR [facility] FORM RECEIVED VIA FAX  WILL BE PLACED IN YOUR BIN AT ASSIGNED DELIVERY TIMES

## 2019-12-13 NOTE — TELEPHONE ENCOUNTER
Faxed forms [facility] to the following fax number [phone] I did receive confirmation

## 2019-12-23 ENCOUNTER — TELEPHONE (OUTPATIENT)
Dept: FAMILY MEDICINE CLINIC | Facility: CLINIC | Age: 49
End: 2019-12-23

## 2019-12-23 DIAGNOSIS — M31.30 WEGENER'S GRANULOMATOSIS: Chronic | ICD-10-CM

## 2019-12-23 DIAGNOSIS — F31.9 BIPOLAR 1 DISORDER (HCC): ICD-10-CM

## 2019-12-23 DIAGNOSIS — G89.4 CHRONIC PAIN SYNDROME: ICD-10-CM

## 2019-12-23 RX ORDER — OXYCODONE AND ACETAMINOPHEN 7.5; 325 MG/1; MG/1
1 TABLET ORAL EVERY 6 HOURS PRN
Qty: 120 TABLET | Refills: 0 | Status: SHIPPED | OUTPATIENT
Start: 2019-12-23 | End: 2020-01-24 | Stop reason: SDUPTHER

## 2019-12-23 RX ORDER — DEXTROAMPHETAMINE SACCHARATE, AMPHETAMINE ASPARTATE MONOHYDRATE, DEXTROAMPHETAMINE SULFATE AND AMPHETAMINE SULFATE 5; 5; 5; 5 MG/1; MG/1; MG/1; MG/1
20 CAPSULE, EXTENDED RELEASE ORAL 2 TIMES DAILY
Qty: 60 CAPSULE | Refills: 0 | Status: SHIPPED | OUTPATIENT
Start: 2019-12-23 | End: 2020-01-21 | Stop reason: SDUPTHER

## 2019-12-23 NOTE — TELEPHONE ENCOUNTER
Patient is calling for refill on the following medications     amphetamine-dextroamphetamine (ADDERALL XR) 20 MG 24 hr capsule    oxyCODONE-acetaminophen (PERCOCET) 7 5-325 MG per tablet

## 2019-12-24 ENCOUNTER — ANESTHESIA EVENT (OUTPATIENT)
Dept: GASTROENTEROLOGY | Facility: HOSPITAL | Age: 49
End: 2019-12-24

## 2019-12-26 ENCOUNTER — TELEPHONE (OUTPATIENT)
Dept: GASTROENTEROLOGY | Facility: AMBULARY SURGERY CENTER | Age: 49
End: 2019-12-26

## 2019-12-26 ENCOUNTER — ANESTHESIA (OUTPATIENT)
Dept: GASTROENTEROLOGY | Facility: HOSPITAL | Age: 49
End: 2019-12-26

## 2019-12-26 ENCOUNTER — HOSPITAL ENCOUNTER (OUTPATIENT)
Dept: GASTROENTEROLOGY | Facility: HOSPITAL | Age: 49
Setting detail: OUTPATIENT SURGERY
Discharge: HOME | End: 2019-12-26
Attending: INTERNAL MEDICINE | Admitting: INTERNAL MEDICINE
Payer: COMMERCIAL

## 2019-12-26 VITALS
BODY MASS INDEX: 31.28 KG/M2 | RESPIRATION RATE: 16 BRPM | DIASTOLIC BLOOD PRESSURE: 88 MMHG | OXYGEN SATURATION: 99 % | WEIGHT: 170 LBS | HEART RATE: 77 BPM | HEIGHT: 62 IN | SYSTOLIC BLOOD PRESSURE: 152 MMHG | TEMPERATURE: 97.6 F

## 2019-12-26 DIAGNOSIS — R11.2 NAUSEA AND VOMITING, INTRACTABILITY OF VOMITING NOT SPECIFIED, UNSPECIFIED VOMITING TYPE: ICD-10-CM

## 2019-12-26 DIAGNOSIS — K31.84 GASTROPARESIS: ICD-10-CM

## 2019-12-26 LAB — GLUCOSE SERPL-MCNC: 85 MG/DL (ref 65–140)

## 2019-12-26 PROCEDURE — 43236 UPPR GI SCOPE W/SUBMUC INJ: CPT | Performed by: INTERNAL MEDICINE

## 2019-12-26 PROCEDURE — 82948 REAGENT STRIP/BLOOD GLUCOSE: CPT

## 2019-12-26 RX ORDER — SODIUM CHLORIDE 9 MG/ML
125 INJECTION, SOLUTION INTRAVENOUS CONTINUOUS
Status: DISCONTINUED | OUTPATIENT
Start: 2019-12-26 | End: 2019-12-30 | Stop reason: HOSPADM

## 2019-12-26 RX ORDER — ONDANSETRON 2 MG/ML
4 INJECTION INTRAMUSCULAR; INTRAVENOUS EVERY 6 HOURS PRN
Status: DISCONTINUED | OUTPATIENT
Start: 2019-12-26 | End: 2019-12-30 | Stop reason: HOSPADM

## 2019-12-26 RX ORDER — PROPOFOL 10 MG/ML
INJECTION, EMULSION INTRAVENOUS AS NEEDED
Status: DISCONTINUED | OUTPATIENT
Start: 2019-12-26 | End: 2019-12-26 | Stop reason: SURG

## 2019-12-26 RX ADMIN — ONABOTULINUMTOXINA 100 UNITS: 100 INJECTION, POWDER, LYOPHILIZED, FOR SOLUTION INTRADERMAL; INTRAMUSCULAR at 10:19

## 2019-12-26 RX ADMIN — ONDANSETRON 4 MG: 2 INJECTION INTRAMUSCULAR; INTRAVENOUS at 10:48

## 2019-12-26 RX ADMIN — PROPOFOL 50 MG: 10 INJECTION, EMULSION INTRAVENOUS at 10:17

## 2019-12-26 RX ADMIN — PROPOFOL 150 MG: 10 INJECTION, EMULSION INTRAVENOUS at 10:15

## 2019-12-26 RX ADMIN — SODIUM CHLORIDE 125 ML/HR: 0.9 INJECTION, SOLUTION INTRAVENOUS at 09:40

## 2019-12-26 RX ADMIN — PROPOFOL 50 MG: 10 INJECTION, EMULSION INTRAVENOUS at 10:18

## 2019-12-26 RX ADMIN — PROPOFOL 50 MG: 10 INJECTION, EMULSION INTRAVENOUS at 10:19

## 2019-12-26 NOTE — DISCHARGE INSTRUCTIONS
Duodenitis   WHAT YOU NEED TO KNOW:   Duodenitis is inflammation or irritation of the duodenum  The duodenum is the first part of the small intestine, just below your stomach  DISCHARGE INSTRUCTIONS:   Seek care immediately if:   · You have severe abdominal pain  · You have bloody or black, tarry bowel movements or vomit  Contact your healthcare provider if:   · You have new or worsening symptoms, even after treatment  · You have questions or concerns about your condition or care  Medicines:   · Medicines  may be given to treat an infection or to control stomach acid  · Take your medicine as directed  Contact your healthcare provider if you think your medicine is not helping or if you have side effects  Tell him or her if you are allergic to any medicine  Keep a list of the medicines, vitamins, and herbs you take  Include the amounts, and when and why you take them  Bring the list or the pill bottles to follow-up visits  Carry your medicine list with you in case of an emergency  Follow up with your healthcare provider as directed:  Write down your questions so you remember to ask them during your visits  Do not smoke:  Nicotine and other chemicals in cigarettes and cigars can cause blood vessel and lung damage  Ask your healthcare provider for information if you currently smoke and need help to quit  E-cigarettes or smokeless tobacco still contain nicotine  Talk to your healthcare provider before you use these products  Limit or do not drink alcohol:  Alcohol can make your duodenitis worse  Talk to your healthcare provider if you need help to stop drinking  Keep batteries and similar objects out of the reach of children:  Babies often put items in their mouths to explore them  Button batteries are easy to swallow and can cause serious damage  Keep the battery covers of electronic devices such as remote controls taped closed   Store all batteries and toxic materials where children cannot get to them  Use childproof locks to keep children away from dangerous materials  Manage or prevent duodenitis:   · Do not take NSAIDs or aspirin unless directed  These and similar medicines can cause irritation  It may help to take NSAIDs with food, but you may not be able to take them at all  · Do not eat foods that cause irritation  Foods such as oranges and salsa can cause burning or pain  Eat a variety of healthy foods  Examples include fruits (not citrus), vegetables, low-fat dairy products, beans, whole-grain breads, and lean meats and fish  Try to eat small meals, and drink water with your meals  Do not eat for at least 3 hours before you go to bed  © 2017 [facility] Information is for End User's use only and may not be sold, redistributed or otherwise used for commercial purposes  All illustrations and images included in CareNotes® are the copyrighted property of [employer]  or [employer]  The above information is an  only  It is not intended as medical advice for individual conditions or treatments  Talk to your doctor, nurse or pharmacist before following any medical regimen to see if it is safe and effective for you  Esophagitis   WHAT YOU NEED TO KNOW:   Esophagitis is inflammation or irritation of the lining of the esophagus  DISCHARGE INSTRUCTIONS:   Call 911 for any of the following:   · You have chest pain that does not go away within a few minutes or gets worse  Seek care immediately if:   · You feel like you have food stuck in your throat and you cannot cough it out  Contact your healthcare provider if:   · You have new or worsening symptoms, even after treatment  · You have questions or concerns about your condition or care  Medicines:   · Medicines  may be given to fight an infection or to control stomach acid  · Take your medicine as directed    Contact your healthcare provider if you think your medicine is not helping or if you have side effects  Tell him or her if you are allergic to any medicine  Keep a list of the medicines, vitamins, and herbs you take  Include the amounts, and when and why you take them  Bring the list or the pill bottles to follow-up visits  Carry your medicine list with you in case of an emergency  Follow up with your healthcare provider as directed: You may need ongoing tests or treatment  Write down your questions so you remember to ask them during your visits  Do not smoke:  Nicotine and other chemicals in cigarettes and cigars can cause blood vessel and lung damage  Ask your healthcare provider for information if you currently smoke and need help to quit  E-cigarettes or smokeless tobacco still contain nicotine  Talk to your healthcare provider before you use these products  Do not drink alcohol:  Alcohol can irritate your esophagus  Talk to your healthcare provider if you need help to stop drinking  Keep batteries and similar objects out of the reach of children:  Babies often put items in their mouths to explore them  Button batteries are easy to swallow and can cause serious damage  Keep the battery covers of electronic devices such as remote controls taped closed  Store all batteries and toxic materials where children cannot get to them  Use childproof locks to keep children away from dangerous materials  Manage or prevent esophagitis:   · Limit or do not eat foods that can lead to esophagitis  Foods such as oranges and salsa can irritate your esophagus  Caffeine and chocolate can cause acid reflux  High-fat and fried foods make your stomach digest food more slowly  This increases the amount of stomach acid your esophagus is exposed to  Eat small meals, and drink water with your meals  Soft foods such as yogurt and applesauce may help soothe your throat  Do not eat for at least 3 hours before you go to bed  · Prevent acid reflux  Do not bend over unless it is necessary   Acid may back up into your esophagus when you bend over  If possible, keep the head of your bed elevated while you sleep  This will help keep acid from backing up  Manage stress  Stress can make your symptoms worse or cause stomach acid to back up  · Drink more liquid when you take pills  Drink a full glass of water when you take your pills  Ask your healthcare provider if you can take your pills at least an hour before you go to bed  © 2017 [address] [employer]  Information is for End User's use only and may not be sold, redistributed or otherwise used for commercial purposes  All illustrations and images included in CareNotes® are the copyrighted property of [employer]  or [facility]  The above information is an  only  It is not intended as medical advice for individual conditions or treatments  Talk to your doctor, nurse or pharmacist before following any medical regimen to see if it is safe and effective for you

## 2019-12-26 NOTE — ANESTHESIA PREPROCEDURE EVALUATION
Review of Systems/Medical History  Patient summary reviewed  Chart reviewed  No history of anesthetic complications     Cardiovascular  Negative cardio ROS Hypertension controlled,    Pulmonary  Smoker ex-smoker  , Asthma , well controlled/ stable ,        GI/Hepatic    GERD well controlled, Liver disease ,        Chronic kidney disease stage 4,        Endo/Other  Diabetes well controlled type 2 Insulin,   Comment: Cushing disease Obesity  morbid obesity   GYN  Negative gynecology ROS          Hematology  Anemia anemia of chronic disease,     Musculoskeletal  Back pain ,   Comment: Lower extremity weakness      Neurology      Comment: Cataplexy  Small fiber neuropathy Psychology   Anxiety, Depression , bipolar disorder and being treated for depression,   Chronic pain,   Comment: Conversion disorder  PTSD           Physical Exam    Airway    Mallampati score: II  TM Distance: >3 FB  Neck ROM: full     Dental   No notable dental hx     Cardiovascular  Comment: Negative ROS, Rhythm: regular, Rate: normal, Cardiovascular exam normal    Pulmonary  Pulmonary exam normal Breath sounds clear to auscultation,     Other Findings        Anesthesia Plan  ASA Score- 3     Anesthesia Type- IV sedation with anesthesia with ASA Monitors  Additional Monitors:   Airway Plan:         Plan Factors-Patient not instructed to abstain from smoking on day of procedure  Patient did not smoke on day of surgery  Induction-     Postoperative Plan-     Informed Consent- Anesthetic plan and risks discussed with patient  I personally reviewed this patient with the CRNA  Discussed and agreed on the Anesthesia Plan with the CRNA  Gypsy Bellamy

## 2019-12-26 NOTE — TELEPHONE ENCOUNTER
Gayle Neville office at [facility] and they did not receive any information  After several phone calls at [facility], I copied the clinical records, referral and faxed to Malini in Dr Reema Neville office  Fax # [phone]  Malini will review records and someone will reach out to [facility] does accept [employer]  Patient aware that [facility] did not receive any information, but I faxed her records to Dr Reema Neville office  They are aware her records are coming to be reviewd for appt  She understands

## 2019-12-26 NOTE — TELEPHONE ENCOUNTER
----- Message from Flaca Joy sent at 12/26/2019 10:36 AM EST -----  Hello,    Please see message from Dr Shayna Davila  I see you were working on this referral   Thank you!   ----- Message -----  From: Wellington Ray MD  Sent: 12/26/2019  10:30 AM EST  To: [facility] [place] [address]    Dr Ilia Phillips patient  She was referred to [facility] for severe gastroparesis, patient still have not heard from [facility]  Can you call [facility] to follow up on our referral? Thanks

## 2019-12-27 ENCOUNTER — TELEPHONE (OUTPATIENT)
Dept: GASTROENTEROLOGY | Facility: AMBULARY SURGERY CENTER | Age: 49
End: 2019-12-27

## 2019-12-27 NOTE — TELEPHONE ENCOUNTER
Pt called stating her insurance is requesting a "contact reference auto request" because [facility] is not accepting her insurance for the stomach stent

## 2019-12-27 NOTE — TELEPHONE ENCOUNTER
[facility] AND [facility] - THE [facility], Dr Antelmo Wong' office  They say they do not take her insurance - "there are 2 [place] insurances on my list and if there is another [place], I'm sure we don't take that either " That is not necessarily so  I called her insurance and they said her insurance is par with [facility]  ([address]  )    I was put through to nurse to assist, but the mailbox was full  I called Aster Hand and told her what was going on  I re-faxed medical records along with current insurance cards  I will call Dr Alina Mukherjee office when I return to the office on 1/2/2020  Aster Hand is aware

## 2019-12-30 NOTE — TELEPHONE ENCOUNTER
G[facility]#[phone]  Pt called stating [facility] is requesting an order as to why she is being referred   Pt have upcoming appt on 01/03/20 at [facility]

## 2019-12-31 DIAGNOSIS — M54.42 CHRONIC LOW BACK PAIN WITH BILATERAL SCIATICA, UNSPECIFIED BACK PAIN LATERALITY: ICD-10-CM

## 2019-12-31 DIAGNOSIS — G89.29 CHRONIC LOW BACK PAIN WITH BILATERAL SCIATICA, UNSPECIFIED BACK PAIN LATERALITY: ICD-10-CM

## 2019-12-31 DIAGNOSIS — M54.41 CHRONIC LOW BACK PAIN WITH BILATERAL SCIATICA, UNSPECIFIED BACK PAIN LATERALITY: ICD-10-CM

## 2020-01-02 RX ORDER — TIZANIDINE HYDROCHLORIDE 4 MG/1
CAPSULE, GELATIN COATED ORAL
Qty: 90 CAPSULE | Refills: 2 | Status: SHIPPED | OUTPATIENT
Start: 2020-01-02 | End: 2020-03-17

## 2020-01-08 ENCOUNTER — TELEPHONE (OUTPATIENT)
Dept: FAMILY MEDICINE CLINIC | Facility: CLINIC | Age: 50
End: 2020-01-08

## 2020-01-17 ENCOUNTER — TELEPHONE (OUTPATIENT)
Dept: FAMILY MEDICINE CLINIC | Facility: CLINIC | Age: 50
End: 2020-01-17

## 2020-01-17 DIAGNOSIS — G89.4 CHRONIC PAIN SYNDROME: ICD-10-CM

## 2020-01-17 DIAGNOSIS — R29.898 WEAKNESS OF BOTH LOWER EXTREMITIES: ICD-10-CM

## 2020-01-17 DIAGNOSIS — G62.9 SMALL FIBER NEUROPATHY: ICD-10-CM

## 2020-01-17 RX ORDER — GABAPENTIN 400 MG/1
CAPSULE ORAL
Qty: 90 CAPSULE | Refills: 0 | OUTPATIENT
Start: 2020-01-17

## 2020-01-17 NOTE — TELEPHONE ENCOUNTER
Patient canceled the upcoming appointment with Darshan Schumacher      She is no longer following in our offices

## 2020-01-21 ENCOUNTER — TELEPHONE (OUTPATIENT)
Dept: FAMILY MEDICINE CLINIC | Facility: CLINIC | Age: 50
End: 2020-01-21

## 2020-01-21 DIAGNOSIS — K31.84 GASTROPARESIS: Primary | ICD-10-CM

## 2020-01-21 DIAGNOSIS — F31.9 BIPOLAR 1 DISORDER (HCC): ICD-10-CM

## 2020-01-21 RX ORDER — DEXTROAMPHETAMINE SACCHARATE, AMPHETAMINE ASPARTATE MONOHYDRATE, DEXTROAMPHETAMINE SULFATE AND AMPHETAMINE SULFATE 5; 5; 5; 5 MG/1; MG/1; MG/1; MG/1
20 CAPSULE, EXTENDED RELEASE ORAL 2 TIMES DAILY
Qty: 60 CAPSULE | Refills: 0 | Status: SHIPPED | OUTPATIENT
Start: 2020-01-21 | End: 2020-02-21 | Stop reason: SDUPTHER

## 2020-01-21 RX ORDER — ONDANSETRON 4 MG/1
4 TABLET, FILM COATED ORAL EVERY 8 HOURS PRN
Qty: 20 TABLET | Refills: 0 | Status: SHIPPED | OUTPATIENT
Start: 2020-01-21 | End: 2020-01-27

## 2020-01-22 DIAGNOSIS — N95.2 VAGINAL ATROPHY: ICD-10-CM

## 2020-01-24 ENCOUNTER — TELEPHONE (OUTPATIENT)
Dept: FAMILY MEDICINE CLINIC | Facility: CLINIC | Age: 50
End: 2020-01-24

## 2020-01-24 DIAGNOSIS — G89.4 CHRONIC PAIN SYNDROME: ICD-10-CM

## 2020-01-24 DIAGNOSIS — G62.9 SMALL FIBER NEUROPATHY: ICD-10-CM

## 2020-01-24 DIAGNOSIS — R29.898 WEAKNESS OF BOTH LOWER EXTREMITIES: ICD-10-CM

## 2020-01-24 DIAGNOSIS — M31.30 WEGENER'S GRANULOMATOSIS: Chronic | ICD-10-CM

## 2020-01-24 RX ORDER — OXYCODONE AND ACETAMINOPHEN 7.5; 325 MG/1; MG/1
1 TABLET ORAL EVERY 6 HOURS PRN
Qty: 120 TABLET | Refills: 0 | Status: SHIPPED | OUTPATIENT
Start: 2020-01-24 | End: 2020-02-24 | Stop reason: SDUPTHER

## 2020-01-27 DIAGNOSIS — K31.84 GASTROPARESIS: ICD-10-CM

## 2020-01-27 RX ORDER — ONDANSETRON 4 MG/1
TABLET, FILM COATED ORAL
Qty: 20 TABLET | Refills: 0 | Status: SHIPPED | OUTPATIENT
Start: 2020-01-27 | End: 2020-05-07 | Stop reason: SDUPTHER

## 2020-01-27 RX ORDER — GABAPENTIN 400 MG/1
CAPSULE ORAL
Qty: 90 CAPSULE | Refills: 2 | Status: SHIPPED | OUTPATIENT
Start: 2020-01-27 | End: 2020-04-21

## 2020-02-21 DIAGNOSIS — F31.9 BIPOLAR 1 DISORDER (HCC): ICD-10-CM

## 2020-02-24 DIAGNOSIS — IMO0001 IDDM (INSULIN DEPENDENT DIABETES MELLITUS): Chronic | ICD-10-CM

## 2020-02-24 DIAGNOSIS — G89.4 CHRONIC PAIN SYNDROME: ICD-10-CM

## 2020-02-24 DIAGNOSIS — M31.30 WEGENER'S GRANULOMATOSIS: Chronic | ICD-10-CM

## 2020-02-24 RX ORDER — DEXTROAMPHETAMINE SACCHARATE, AMPHETAMINE ASPARTATE MONOHYDRATE, DEXTROAMPHETAMINE SULFATE AND AMPHETAMINE SULFATE 5; 5; 5; 5 MG/1; MG/1; MG/1; MG/1
20 CAPSULE, EXTENDED RELEASE ORAL 2 TIMES DAILY
Qty: 60 CAPSULE | Refills: 0 | Status: SHIPPED | OUTPATIENT
Start: 2020-02-24 | End: 2020-03-24 | Stop reason: SDUPTHER

## 2020-02-25 RX ORDER — BLOOD-GLUCOSE METER
EACH MISCELLANEOUS
Qty: 300 EACH | Refills: 3 | Status: SHIPPED | OUTPATIENT
Start: 2020-02-25 | End: 2021-03-01

## 2020-02-25 RX ORDER — OXYCODONE AND ACETAMINOPHEN 7.5; 325 MG/1; MG/1
1 TABLET ORAL EVERY 6 HOURS PRN
Qty: 120 TABLET | Refills: 0 | Status: SHIPPED | OUTPATIENT
Start: 2020-02-25 | End: 2020-03-23 | Stop reason: SDUPTHER

## 2020-02-28 ENCOUNTER — OFFICE VISIT (OUTPATIENT)
Dept: FAMILY MEDICINE CLINIC | Facility: CLINIC | Age: 50
End: 2020-02-28

## 2020-02-28 VITALS
DIASTOLIC BLOOD PRESSURE: 68 MMHG | OXYGEN SATURATION: 98 % | WEIGHT: 175 LBS | BODY MASS INDEX: 32.01 KG/M2 | HEART RATE: 86 BPM | RESPIRATION RATE: 18 BRPM | TEMPERATURE: 97.5 F | SYSTOLIC BLOOD PRESSURE: 114 MMHG

## 2020-02-28 DIAGNOSIS — K31.84 GASTROPARESIS: ICD-10-CM

## 2020-02-28 DIAGNOSIS — E78.5 DYSLIPIDEMIA: Primary | Chronic | ICD-10-CM

## 2020-02-28 DIAGNOSIS — B37.9 CANDIDIASIS: ICD-10-CM

## 2020-02-28 DIAGNOSIS — F31.9 BIPOLAR 1 DISORDER (HCC): ICD-10-CM

## 2020-02-28 DIAGNOSIS — M31.31 GRANULOMATOSIS WITH POLYANGIITIS WITH RENAL INVOLVEMENT (HCC): Chronic | ICD-10-CM

## 2020-02-28 DIAGNOSIS — G89.4 CHRONIC PAIN SYNDROME: ICD-10-CM

## 2020-02-28 DIAGNOSIS — K86.1 CHRONIC PANCREATITIS, UNSPECIFIED PANCREATITIS TYPE (HCC): ICD-10-CM

## 2020-02-28 DIAGNOSIS — N18.4 CKD (CHRONIC KIDNEY DISEASE) STAGE 4, GFR 15-29 ML/MIN (HCC): ICD-10-CM

## 2020-02-28 DIAGNOSIS — E11.22 TYPE 2 DIABETES MELLITUS WITH STAGE 4 CHRONIC KIDNEY DISEASE, UNSPECIFIED WHETHER LONG TERM INSULIN USE (HCC): ICD-10-CM

## 2020-02-28 DIAGNOSIS — F11.90 CHRONIC NARCOTIC USE: ICD-10-CM

## 2020-02-28 DIAGNOSIS — J30.1 SEASONAL ALLERGIC RHINITIS DUE TO POLLEN: ICD-10-CM

## 2020-02-28 DIAGNOSIS — Z79.899 CONTROLLED SUBSTANCE AGREEMENT SIGNED: ICD-10-CM

## 2020-02-28 DIAGNOSIS — R68.2 DRY MOUTH: ICD-10-CM

## 2020-02-28 DIAGNOSIS — N18.30 CKD (CHRONIC KIDNEY DISEASE) STAGE 3, GFR 30-59 ML/MIN (HCC): ICD-10-CM

## 2020-02-28 DIAGNOSIS — N18.4 TYPE 2 DIABETES MELLITUS WITH STAGE 4 CHRONIC KIDNEY DISEASE, UNSPECIFIED WHETHER LONG TERM INSULIN USE (HCC): ICD-10-CM

## 2020-02-28 PROCEDURE — 99214 OFFICE O/P EST MOD 30 MIN: CPT | Performed by: PHYSICIAN ASSISTANT

## 2020-02-28 PROCEDURE — 3066F NEPHROPATHY DOC TX: CPT | Performed by: INTERNAL MEDICINE

## 2020-02-28 PROCEDURE — 3074F SYST BP LT 130 MM HG: CPT | Performed by: PHYSICIAN ASSISTANT

## 2020-02-28 PROCEDURE — 3066F NEPHROPATHY DOC TX: CPT | Performed by: PHYSICIAN ASSISTANT

## 2020-02-28 PROCEDURE — 80307 DRUG TEST PRSMV CHEM ANLYZR: CPT | Performed by: PHYSICIAN ASSISTANT

## 2020-02-28 PROCEDURE — 1036F TOBACCO NON-USER: CPT | Performed by: PHYSICIAN ASSISTANT

## 2020-02-28 PROCEDURE — 3078F DIAST BP <80 MM HG: CPT | Performed by: PHYSICIAN ASSISTANT

## 2020-02-28 RX ORDER — AMLODIPINE BESYLATE 5 MG/1
5 TABLET ORAL DAILY
Qty: 90 TABLET | Refills: 3 | Status: SHIPPED | OUTPATIENT
Start: 2020-02-28 | End: 2020-12-29

## 2020-02-28 RX ORDER — NYSTATIN 100000 [USP'U]/G
POWDER TOPICAL 2 TIMES DAILY
Qty: 60 G | Refills: 2 | Status: SHIPPED | OUTPATIENT
Start: 2020-02-28 | End: 2020-05-19

## 2020-02-28 RX ORDER — FLUORIDE TOOTHPASTE
15 TOOTHPASTE DENTAL 2 TIMES DAILY
Qty: 1000 ML | Refills: 11 | Status: SHIPPED | OUTPATIENT
Start: 2020-02-28 | End: 2020-11-10

## 2020-02-28 RX ORDER — QUETIAPINE FUMARATE 100 MG/1
100 TABLET, FILM COATED ORAL
Qty: 90 TABLET | Refills: 1 | Status: SHIPPED | OUTPATIENT
Start: 2020-02-28 | End: 2020-09-24 | Stop reason: SDUPTHER

## 2020-02-28 RX ORDER — QUETIAPINE FUMARATE 100 MG/1
100 TABLET, FILM COATED ORAL
Qty: 90 TABLET | Refills: 1 | Status: SHIPPED | OUTPATIENT
Start: 2020-02-28 | End: 2020-02-28 | Stop reason: SDUPTHER

## 2020-02-28 NOTE — ASSESSMENT & PLAN NOTE
Currently no symptoms  Has been monitoring diet    Patient has also been routinely following up with GI

## 2020-02-28 NOTE — ASSESSMENT & PLAN NOTE
Lab Results   Component Value Date    HGBA1C 6 3 05/21/2019    A1c at [facility] was 5 9%  Patient has not been using insulin on a regular basis  Appears to understand the importance of checking her blood sugar, and using insulin on an as-needed basis  Reason her A1c may be trending down is lack of appetite due to her gastroparesis  Continue monitoring blood sugar, and if there is a concern that blood sugar is increasing, recommend make a follow-up appointment

## 2020-02-28 NOTE — ASSESSMENT & PLAN NOTE
At this time will discontinue all allergy medication to see if this helps minimize the dry throat nasal passages  Has been unable to tolerate steroid nasal sprays in the past   Would recommend trying saline nasal spray

## 2020-02-28 NOTE — ASSESSMENT & PLAN NOTE
Currently following up with Nephrology due to involvement with her kidneys  No new complications at this time  For her chronic pain, continue with Percocet

## 2020-02-28 NOTE — ASSESSMENT & PLAN NOTE
Previous lab work did show elevated cholesterol levels  Has been several years since her last lipid panel  Placed an order once again to have cholesterol rechecked  Get completed at earliest convenience

## 2020-02-28 NOTE — PROGRESS NOTES
Assessment/Plan:    Pancreatitis  Currently no symptoms  Has been monitoring diet  Patient has also been routinely following up with GI  Gastroparesis  [facility]mildred Pinedo  Is in the process of getting a pacemaker to help with digestion  Type 2 diabetes mellitus with renal complication [facility])    Lab Results   Component Value Date    HGBA1C 6 3 05/21/2019    [facility] was 5 9%  Patient has not been using insulin on a regular basis  Appears to understand the importance of checking her blood sugar, and using insulin on an as-needed basis  Reason her A1c may be trending down is lack of appetite due to her gastroparesis  Continue monitoring blood sugar, and if there is a concern that blood sugar is increasing, recommend make a follow-up appointment  Seasonal allergic rhinitis due to pollen  At this time will discontinue all allergy medication to see if this helps minimize the dry throat nasal passages  Has been unable to tolerate steroid nasal sprays in the past   Would recommend trying saline nasal spray  Wegener's granulomatosis ([facility] [address] )  Currently following up with Nephrology due to involvement with her kidneys  No new complications at this time  For her chronic pain, continue with Percocet  CKD (chronic kidney disease) stage 4, GFR 15-29 ml/min ([facility])  Currently following up with Nephrology  Bipolar 1 disorder ([facility])  Stable  Continue with Seroquel and Adderall  Dyslipidemia  Previous lab work did show elevated cholesterol levels  Has been several years since her last lipid panel  Placed an order once again to have cholesterol rechecked  Get completed at earliest convenience  Chronic narcotic use  [place] drug database was reviewed, no concerns at this time  Diagnoses and all orders for this visit:    Dyslipidemia  -     Lipid Panel with Direct LDL reflex;  Future    Chronic pain syndrome  -     Toxicology screen, urine    Type 2 diabetes mellitus with stage 4 chronic kidney disease, unspecified whether long term insulin use ([facility])    CKD (chronic kidney disease) stage 4, GFR 15-29 ml/min ([facility])    Chronic pancreatitis, unspecified pancreatitis type ([facility])    Candidiasis  -     nystatin (MYCOSTATIN) powder; Apply topically 2 (two) times a day    Dry mouth  -     Mouthwashes (BIOTENE/CALCIUM PBF) LIQD; Apply 15 mL to the mouth or throat 2 (two) times a day Swish for 30 seconds then spit out  Gastroparesis    Seasonal allergic rhinitis due to pollen    Granulomatosis with polyangiitis with renal involvement ([facility])    Bipolar 1 disorder ([facility])  -     QUEtiapine (SEROquel) 100 mg tablet; Take 1 tablet (100 mg total) by mouth daily at bedtime    Controlled substance agreement signed    Chronic narcotic use          Subjective:      Patient ID: Bhupinder Nicholson is a 52 y o  female  51-year-old female presenting for routine follow  Patient has been diagnosed with chronic pancreatitis, and gastroparesis  Patient has been following up with GI at [place]  Has not had any exacerbations of her pancreatitis recently  Has been diet controlled, but has been having difficulty with keeping food down recently  Her gastroparesis has been getting worse  Is currently working with GI to get a pacemaker  Patient has noticed significant weight loss as she has been unable to keep any food down  This has also increased her fatigue  Has been diagnosed with redness granulomatosis and microscopic polyangiitis  Has a history of sores due to these conditions, but all have healed over, and has not noticed any recent sores  Condition has also affected her kidneys, and she is in stage 4 chronic kidney disease  Is currently following with nephrology  Her condition is also led to chronic joint pain  Has had imaging done in the past, and MRI of lumbar spine has shown moderate amount of arthritis  Currently denies any swelling or erythema in any joints    Has been diagnosed with diabetes in the past   Is monitoring her blood sugar  Uses insulin on an as-needed basis  States it has been awhile since she has done any insulin injection  Recent A1c in January was 5 9%  Patient has been diagnosed with bipolar and ADHD  Was previously following up with Psychiatry, but had frequent no-shows so was discharged from the practice  Is currently on Adderall 20 mg twice a day and Seroquel 100 mg at bedtime  Has no concerns at this time  Patient has concerns with dry nasal passages and dry throat  States it feels like it is difficulty to swallow at times  Has not noticed any mucus production  Has been treated for allergic rhinitis in the past, is currently on Singulair  Denies any nose bleeds or odynophagia  Patient is also concerned with the rash in the pelvic region  With her weight loss she has excess skin around the abdomen, and recently has been making her more prone to fungal infections  Is requesting it powder to help minimize spread of rash, and pruritus  The following portions of the patient's history were reviewed and updated as appropriate:   She  has a past medical history of ADHD, Anemia of chronic disease, Anxiety, Asthma, Asthma, Borderline personality disorder ([address] ), Cataplexy, Chronic abdominal pain, CKD (chronic kidney disease) stage 3, GFR 30-59 ml/min ([address] ), 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[address] ), Cushing syndrome ([address] ), Diabetes mellitus ([address] ), DVT (deep venous thrombosis) ([address] ), GERD (gastroesophageal reflux disease), History of acute pancreatitis, History of transfusion, HTN (hypertension), Hypertension, Liver disease, Microscopic polyangiitis ([address] ), Morbid obesity ([address] ), MPA (microscopic polyangiitis) ([address] ), Ovarian cyst, PTSD (post-traumatic stress disorder), Renal disorder, Self-inflicted injury, and Wegener's granulomatosis with renal involvement ([address] ) (2015)    She   Patient Active Problem List    Diagnosis Date Noted    Contusion of left hip 09/19/2019    Trochanteric bursitis of left hip 09/19/2019    Hyperosmia 06/03/2019    Smell disturbance 06/03/2019    Gastroparesis 02/21/2019    IBS (irritable bowel syndrome) 02/01/2019    Leukocytosis 02/01/2019    Small fiber neuropathy 12/06/2018    Left leg pain 11/14/2018    Controlled substance agreement signed 11/14/2018    Chronic narcotic use 11/14/2018    Persistent proteinuria 09/11/2018    Gastroesophageal reflux disease 07/26/2018    Weakness of both upper extremities 07/26/2018    Seasonal allergic rhinitis due to pollen 04/26/2018    Plantar wart, right foot 04/16/2018    Chronic pelvic pain in female 04/13/2018    Diarrhea of presumed infectious origin 03/28/2018    Epigastric pain 01/07/2018    Pancreatitis 01/07/2018    Ovarian cyst 01/07/2018    Postherpetic neuralgia 01/07/2018    Bipolar 1 disorder ([place] [address] ) 12/21/2017    Chronic pain 07/25/2017    Noncompliance 07/25/2017    Cataplexy 12/07/2016    Weakness of both lower extremities 11/29/2016    Acute pancreatitis 07/24/2016    CKD (chronic kidney disease) stage 4, GFR 15-29 ml/min ([facility])     MPA (microscopic polyangiitis) ([facility])     Asthma     HTN (hypertension)     Arthralgia of multiple joints 05/24/2016    Type 2 diabetes mellitus with renal complication ([place] [address] ) 10/01/6414    Dyslipidemia 04/06/2016    Wegener's granulomatosis ([place] [address] ) 04/06/2016    Anemia of chronic disease 07/01/2015    Neuropathy 07/08/2011     She  has a past surgical history that includes Esophagogastroduodenoscopy (09/11/2015); pr esophagogastroduodenoscopy transoral diagnostic (N/A, 12/14/2018); pr colonoscopy flx dx w/collj spec when pfrmd (N/A, 12/14/2018); and Release scar contracture / graft repairs of hand (Bilateral)  Her family history includes No Known Problems in her father and mother  She  reports that she quit smoking about 9 years ago  She has never used smokeless tobacco  She reports that she has current or past drug history  Drug: Marijuana   She reports that she does not drink alcohol    Current Outpatient Medications   Medication Sig Dispense Refill    albuterol (VENTOLIN HFA) 90 mcg/act inhaler Inhale 2 puffs every 6 (six) hours as needed for wheezing 18 g 1    Alcohol Swabs (ALCOHOL PADS) 70 % PADS by Does not apply route 4 (four) times a day 400 each 3    amphetamine-dextroamphetamine (ADDERALL XR) 20 MG 24 hr capsule Take 1 capsule (20 mg total) by mouth 2 (two) times a dayMax Daily Amount: 40 mg 60 capsule 0    Blood Glucose Monitoring Suppl (FREESTYLE LITE) DEIRDRE by Does not apply route daily 1 each 0    buPROPion (WELLBUTRIN XL) 150 mg 24 hr tablet bupropion HCl  mg 24 hr tablet, extended release      conjugated estrogens (PREMARIN) vaginal cream INSERT 1 GRAM PER VAGINA DAILY AT BEDTIME  FOR TWO WEEKS AND THEN CHANGE TO 1 GRAM TWICE WEEKLY 30 g 3    Easy Comfort Lancets MISC USE THREE TIMES A  each 3    famotidine (PEPCID) 20 mg tablet TAKE ONE TABLET BY MOUTH TWICE A  tablet 1    FREESTYLE LITE test strip TEST THREE TIMES A DAY AS DIRECTED 100 each 11    insulin aspart (NOVOLOG FLEXPEN) 100 Units/mL injection pen Inject 6 Units under the skin 3 (three) times a day with meals 5 pen 3    Insulin Pen Needle (PEN NEEDLES) 31G X 8 MM MISC Inject 1 Stick under the skin 4 (four) times a day (with meals and at bedtime) 100 each 6    lidocaine (XYLOCAINE) 5 % ointment Apply topically 2 (two) times a day as needed for mild pain 250 g 1    ondansetron (ZOFRAN) 4 mg tablet TAKE ONE TABLET BY MOUTH EVERY 8 HOURS AS NEEDED FOR NAUSEA OR VOMITING 20 tablet 0    oxyCODONE-acetaminophen (PERCOCET) 7 5-325 MG per tablet Take 1 tablet by mouth every 6 (six) hours as needed (pain)Max Daily Amount: 4 tablets 120 tablet 0    Probiotic Product (PROBIOTIC-10 PO) Take 1 tablet by mouth daily      QUEtiapine (SEROquel) 100 mg tablet Take 1 tablet (100 mg total) by mouth daily at bedtime 90 tablet 1    scopolamine (TRANSDERM-SCOP) 1 5 mg/3 days TD 72 hr patch Place 1 patch on the skin every third day 10 patch 3    TiZANidine (ZANAFLEX) 4 MG capsule TAKE 1 CAPSULE BY MOUTH THREE TIMES A DAY 90 capsule 2    TOUJEO MAX SOLOSTAR 300 units/mL CONCETRATED U-300 injection pen INJECT 22 UNITS UNDER THE SKIN DAILY 6 pen 0    amLODIPine (NORVASC) 5 mg tablet Take 1 tablet (5 mg total) by mouth daily (Patient not taking: Reported on 2/28/2020) 90 tablet 3    cycloSPORINE (RESTASIS) 0 05 % ophthalmic emulsion Administer 1 drop to both eyes 2 (two) times a day      dicyclomine (BENTYL) 20 mg tablet Take 1 tablet (20 mg total) by mouth every 6 (six) hours as needed (pain) (Patient not taking: Reported on 2/28/2020) 10 tablet 0    dicyclomine (BENTYL) 20 mg tablet Take 1 tablet (20 mg total) by mouth 2 (two) times a day for 5 days 10 tablet 0    Disposable Gloves MISC by Does not apply route 4 (four) times a day As needed for hygiene (Patient not taking: Reported on 2/28/2020) 100 each 11    gabapentin (NEURONTIN) 400 mg capsule       gabapentin (NEURONTIN) 400 mg capsule TAKE 1 CAPSULE (400 MG TOTAL) BY MOUTH THREE (THREE) TIMES A DAY FOR 30 DAYS (Patient not taking: Reported on 2/28/2020) 90 capsule 2    Humidifiers (COOL MIST HUMIDIFIER 1 GALLON) MISC by Does not apply route as needed (shortness of breath) (Patient not taking: Reported on 2/28/2020) 1 each 0    metoclopramide (REGLAN) 10 mg tablet Take 1 tablet (10 mg total) by mouth every 6 (six) hours (Patient not taking: Reported on 2/28/2020) 15 tablet 0    Mouthwashes (BIOTENE/CALCIUM PBF) LIQD Apply 15 mL to the mouth or throat 2 (two) times a day Swish for 30 seconds then spit out  1000 mL 11    nystatin (MYCOSTATIN) powder Apply topically 2 (two) times a day 60 g 2     No current facility-administered medications for this visit        She is allergic to prozac [fluoxetine hcl]; bactrim [sulfamethoxazole-trimethoprim]; lamictal [lamotrigine]; lithium; amlodipine; haldol [haloperidol]; ibuprofen; lexapro [escitalopram oxalate]; navane [thiothixene]; and other       Review of Systems   Constitutional: Positive for fatigue  Negative for chills, diaphoresis and fever  HENT: Negative for congestion, dental problem, drooling, ear pain, postnasal drip, rhinorrhea, sinus pressure, sore throat and trouble swallowing  See HPI   Eyes: Negative for visual disturbance  Respiratory: Negative for cough, chest tightness, shortness of breath and wheezing  Cardiovascular: Negative for chest pain, palpitations and leg swelling  Gastrointestinal: Positive for abdominal pain, nausea and vomiting  Negative for constipation and diarrhea  Endocrine: Negative for polydipsia, polyphagia and polyuria  Genitourinary: Negative for dysuria and hematuria  Musculoskeletal: Positive for arthralgias, back pain and gait problem  Negative for joint swelling and myalgias  Skin: Negative for rash and wound  Neurological: Positive for dizziness, weakness and numbness  Negative for light-headedness and headaches  Psychiatric/Behavioral: Negative for dysphoric mood, sleep disturbance and suicidal ideas  The patient is not nervous/anxious  Objective:      /68 (BP Location: Right arm, Patient Position: Sitting, Cuff Size: Large)   Pulse 86   Temp 97 5 °F (36 4 °C)   Resp 18   Wt 79 4 kg (175 lb)   LMP  (LMP Unknown)   SpO2 98%   BMI 32 01 kg/m²          Physical Exam   Constitutional: She is oriented to person, place, and time  She appears well-developed and well-nourished  No distress  HENT:   Right Ear: External ear normal    Left Ear: External ear normal    Nose: Nose normal  No mucosal edema or rhinorrhea  Mouth/Throat: Oropharynx is clear and moist    Neck: Normal range of motion  Neck supple  No thyromegaly present  Cardiovascular: Normal rate, regular rhythm and normal heart sounds  Exam reveals no gallop and no friction rub  Pulses are weak pulses  No murmur heard    Pulses:       Dorsalis pedis pulses are 1+ on the right side, and 1+ on the left side  Posterior tibial pulses are 1+ on the right side, and 1+ on the left side  Pulmonary/Chest: Effort normal and breath sounds normal  No respiratory distress  She has no wheezes  Abdominal: Soft  Bowel sounds are normal  She exhibits no distension  There is tenderness  There is no rebound and no guarding  Musculoskeletal: Normal range of motion  She exhibits no edema  Feet:   Right Foot:   Skin Integrity: Positive for dry skin  Negative for ulcer, skin breakdown, erythema, warmth or callus  Left Foot:   Skin Integrity: Positive for dry skin  Negative for ulcer, skin breakdown, erythema, warmth or callus  Lymphadenopathy:     She has no cervical adenopathy  Neurological: She is alert and oriented to person, place, and time  She displays normal reflexes  No cranial nerve deficit  She exhibits abnormal muscle tone  Coordination normal    Skin: She is not diaphoretic  Psychiatric: She has a normal mood and affect  Her behavior is normal  Thought content normal    Nursing note and vitals reviewed  Patient's shoes and socks removed  Right Foot/Ankle   Right Foot Inspection  Skin Exam: skin normal, skin intact and dry skin no warmth, no callus, no erythema, no maceration, no abnormal color, no pre-ulcer, no ulcer and no callus                          Toe Exam: ROM and strength within normal limitsno tenderness, erythema and  no right toe deformity  Sensory     Proprioception: intact   Monofilament testing: diminished  Vascular  Capillary refills: < 3 seconds  The right DP pulse is 1+  The right PT pulse is 1+       Left Foot/Ankle  Left Foot Inspection  Skin Exam: skin normal, skin intact and dry skinno warmth, no erythema, no maceration, normal color, no pre-ulcer, no ulcer and no callus                         Toe Exam: ROM and strength within normal limitsno tenderness, no erythema and no left toe deformity                   Sensory Proprioception: intact  Monofilament: diminished  Vascular  Capillary refills: < 3 seconds  The left DP pulse is 1+  The left PT pulse is 1+  Assign Risk Category:  No deformity present;  Loss of protective sensation; Weak pulses       Risk: 2

## 2020-03-02 DIAGNOSIS — IMO0001 IDDM (INSULIN DEPENDENT DIABETES MELLITUS): Chronic | ICD-10-CM

## 2020-03-03 RX ORDER — INSULIN ASPART 100 [IU]/ML
INJECTION, SOLUTION INTRAVENOUS; SUBCUTANEOUS
Qty: 15 ML | Refills: 2 | Status: SHIPPED | OUTPATIENT
Start: 2020-03-03 | End: 2020-07-30 | Stop reason: SDUPTHER

## 2020-03-05 LAB
AMPHETAMINES UR QL SCN: POSITIVE
BARBITURATES UR QL SCN: NEGATIVE NG/ML
BENZODIAZ UR QL: NEGATIVE NG/ML
BZE UR QL: NEGATIVE NG/ML
CANNABINOIDS UR QL SCN: POSITIVE
METHADONE UR QL SCN: NEGATIVE NG/ML
OPIATES UR QL: NEGATIVE
PCP UR QL: NEGATIVE NG/ML
PROPOXYPH UR QL SCN: NEGATIVE NG/ML

## 2020-03-09 ENCOUNTER — TELEPHONE (OUTPATIENT)
Dept: FAMILY MEDICINE CLINIC | Facility: CLINIC | Age: 50
End: 2020-03-09

## 2020-03-09 NOTE — TELEPHONE ENCOUNTER
SIGNATURES NEEDED FOR [employer] FORM RECEIVED VIA FAX  WILL BE PLACED IN YOUR BIN AT ASSIGNED DELIVERY TIMES

## 2020-03-13 NOTE — TELEPHONE ENCOUNTER
Faxed [employer] to the following fax number [phone] I did receive confirmation  In process to be scanned into pt chart

## 2020-03-16 DIAGNOSIS — M54.41 CHRONIC LOW BACK PAIN WITH BILATERAL SCIATICA, UNSPECIFIED BACK PAIN LATERALITY: ICD-10-CM

## 2020-03-16 DIAGNOSIS — G89.29 CHRONIC LOW BACK PAIN WITH BILATERAL SCIATICA, UNSPECIFIED BACK PAIN LATERALITY: ICD-10-CM

## 2020-03-16 DIAGNOSIS — M54.42 CHRONIC LOW BACK PAIN WITH BILATERAL SCIATICA, UNSPECIFIED BACK PAIN LATERALITY: ICD-10-CM

## 2020-03-17 DIAGNOSIS — R11.2 NAUSEA AND VOMITING, INTRACTABILITY OF VOMITING NOT SPECIFIED, UNSPECIFIED VOMITING TYPE: ICD-10-CM

## 2020-03-17 RX ORDER — TIZANIDINE HYDROCHLORIDE 4 MG/1
CAPSULE, GELATIN COATED ORAL
Qty: 90 CAPSULE | Refills: 2 | Status: SHIPPED | OUTPATIENT
Start: 2020-03-17 | End: 2020-04-22

## 2020-03-17 RX ORDER — SCOLOPAMINE TRANSDERMAL SYSTEM 1 MG/1
1 PATCH, EXTENDED RELEASE TRANSDERMAL
Qty: 10 PATCH | Refills: 3 | Status: SHIPPED | OUTPATIENT
Start: 2020-03-17 | End: 2021-05-01 | Stop reason: HOSPADM

## 2020-03-19 ENCOUNTER — TELEPHONE (OUTPATIENT)
Dept: FAMILY MEDICINE CLINIC | Facility: CLINIC | Age: 50
End: 2020-03-19

## 2020-03-19 NOTE — TELEPHONE ENCOUNTER
Did not now they wanted medical note when I signed off on the script  There is a note in the chart from [id number] if it needs to be sent  I printed out

## 2020-03-23 DIAGNOSIS — G89.4 CHRONIC PAIN SYNDROME: ICD-10-CM

## 2020-03-23 DIAGNOSIS — M31.30 WEGENER'S GRANULOMATOSIS: Chronic | ICD-10-CM

## 2020-03-23 RX ORDER — OXYCODONE AND ACETAMINOPHEN 7.5; 325 MG/1; MG/1
1 TABLET ORAL EVERY 6 HOURS PRN
Qty: 120 TABLET | Refills: 0 | Status: SHIPPED | OUTPATIENT
Start: 2020-03-23 | End: 2020-04-21 | Stop reason: SDUPTHER

## 2020-03-24 DIAGNOSIS — F31.9 BIPOLAR 1 DISORDER (HCC): ICD-10-CM

## 2020-03-24 RX ORDER — DEXTROAMPHETAMINE SACCHARATE, AMPHETAMINE ASPARTATE MONOHYDRATE, DEXTROAMPHETAMINE SULFATE AND AMPHETAMINE SULFATE 5; 5; 5; 5 MG/1; MG/1; MG/1; MG/1
20 CAPSULE, EXTENDED RELEASE ORAL 2 TIMES DAILY
Qty: 60 CAPSULE | Refills: 0 | Status: SHIPPED | OUTPATIENT
Start: 2020-03-24 | End: 2020-04-21 | Stop reason: SDUPTHER

## 2020-04-16 LAB
ALBUMIN SERPL-MCNC: 3.9 G/DL (ref 3.6–5.1)
ALBUMIN/CREAT UR: 239 MCG/MG CREAT
APPEARANCE UR: CLEAR
BASOPHILS # BLD AUTO: 9 CELLS/UL (ref 0–200)
BASOPHILS NFR BLD AUTO: 0.2 %
BILIRUB UR QL STRIP: NEGATIVE
BUN SERPL-MCNC: 29 MG/DL (ref 7–25)
BUN/CREAT SERPL: 15 (CALC) (ref 6–22)
CALCIUM SERPL-MCNC: 9.2 MG/DL (ref 8.6–10.2)
CALCIUM SERPL-MCNC: 9.2 MG/DL (ref 8.6–10.2)
CHLORIDE SERPL-SCNC: 108 MMOL/L (ref 98–110)
CO2 SERPL-SCNC: 28 MMOL/L (ref 20–32)
COLOR UR: YELLOW
CREAT SERPL-MCNC: 1.94 MG/DL (ref 0.5–1.1)
CREAT UR-MCNC: 165 MG/DL (ref 20–275)
EOSINOPHIL # BLD AUTO: 31 CELLS/UL (ref 15–500)
EOSINOPHIL NFR BLD AUTO: 0.7 %
ERYTHROCYTE [DISTWIDTH] IN BLOOD BY AUTOMATED COUNT: 13.4 % (ref 11–15)
GLUCOSE SERPL-MCNC: 122 MG/DL (ref 65–99)
GLUCOSE UR QL STRIP: NEGATIVE
HCT VFR BLD AUTO: 38.5 % (ref 35–45)
HGB BLD-MCNC: 12.9 G/DL (ref 11.7–15.5)
HGB UR QL STRIP: NEGATIVE
KETONES UR QL STRIP: NEGATIVE
LEUKOCYTE ESTERASE UR QL STRIP: NEGATIVE
LYMPHOCYTES # BLD AUTO: 1342 CELLS/UL (ref 850–3900)
LYMPHOCYTES NFR BLD AUTO: 30.5 %
MCH RBC QN AUTO: 30.4 PG (ref 27–33)
MCHC RBC AUTO-ENTMCNC: 33.5 G/DL (ref 32–36)
MCV RBC AUTO: 90.6 FL (ref 80–100)
MICROALBUMIN UR-MCNC: 39.4 MG/DL
MONOCYTES # BLD AUTO: 378 CELLS/UL (ref 200–950)
MONOCYTES NFR BLD AUTO: 8.6 %
MYELOPEROXIDASE AB SER IA-ACNC: <1 AI
NEUTROPHILS # BLD AUTO: 2640 CELLS/UL (ref 1500–7800)
NEUTROPHILS NFR BLD AUTO: 60 %
NITRITE UR QL STRIP: NEGATIVE
PH UR STRIP: 5.5 [PH] (ref 5–8)
PHOSPHATE SERPL-MCNC: 3.3 MG/DL (ref 2.5–4.5)
PLATELET # BLD AUTO: 278 THOUSAND/UL (ref 140–400)
PMV BLD REES-ECKER: 10.7 FL (ref 7.5–12.5)
POTASSIUM SERPL-SCNC: 4.7 MMOL/L (ref 3.5–5.3)
PROT UR QL STRIP: ABNORMAL
PROTEINASE3 AB SER IA-ACNC: <1 AI
PTH-INTACT SERPL-MCNC: 70 PG/ML (ref 14–64)
RBC # BLD AUTO: 4.25 MILLION/UL (ref 3.8–5.1)
SL AMB EGFR AFRICAN AMERICAN: 34 ML/MIN/1.73M2
SL AMB EGFR NON AFRICAN AMERICAN: 30 ML/MIN/1.73M2
SODIUM SERPL-SCNC: 140 MMOL/L (ref 135–146)
SP GR UR STRIP: 1.02 (ref 1–1.03)
WBC # BLD AUTO: 4.4 THOUSAND/UL (ref 3.8–10.8)

## 2020-04-16 PROCEDURE — 3060F POS MICROALBUMINURIA REV: CPT | Performed by: INTERNAL MEDICINE

## 2020-04-16 PROCEDURE — 3060F POS MICROALBUMINURIA REV: CPT | Performed by: PHYSICIAN ASSISTANT

## 2020-04-20 ENCOUNTER — TELEPHONE (OUTPATIENT)
Dept: FAMILY MEDICINE CLINIC | Facility: CLINIC | Age: 50
End: 2020-04-20

## 2020-04-20 DIAGNOSIS — R10.13 EPIGASTRIC PAIN: ICD-10-CM

## 2020-04-20 DIAGNOSIS — R29.898 WEAKNESS OF BOTH LOWER EXTREMITIES: ICD-10-CM

## 2020-04-20 DIAGNOSIS — G89.4 CHRONIC PAIN SYNDROME: ICD-10-CM

## 2020-04-20 DIAGNOSIS — G62.9 SMALL FIBER NEUROPATHY: ICD-10-CM

## 2020-04-21 ENCOUNTER — TELEMEDICINE (OUTPATIENT)
Dept: FAMILY MEDICINE CLINIC | Facility: CLINIC | Age: 50
End: 2020-04-21

## 2020-04-21 VITALS — TEMPERATURE: 98.7 F | BODY MASS INDEX: 31.1 KG/M2 | WEIGHT: 169 LBS | HEIGHT: 62 IN

## 2020-04-21 DIAGNOSIS — M31.30 WEGENER'S GRANULOMATOSIS: Chronic | ICD-10-CM

## 2020-04-21 DIAGNOSIS — G89.4 CHRONIC PAIN SYNDROME: ICD-10-CM

## 2020-04-21 DIAGNOSIS — N18.4 TYPE 2 DIABETES MELLITUS WITH STAGE 4 CHRONIC KIDNEY DISEASE, UNSPECIFIED WHETHER LONG TERM INSULIN USE (HCC): ICD-10-CM

## 2020-04-21 DIAGNOSIS — G47.411 CATAPLEXY: ICD-10-CM

## 2020-04-21 DIAGNOSIS — F31.9 BIPOLAR 1 DISORDER (HCC): ICD-10-CM

## 2020-04-21 DIAGNOSIS — M31.31 GRANULOMATOSIS WITH POLYANGIITIS WITH RENAL INVOLVEMENT (HCC): Chronic | ICD-10-CM

## 2020-04-21 DIAGNOSIS — E11.22 TYPE 2 DIABETES MELLITUS WITH STAGE 4 CHRONIC KIDNEY DISEASE, UNSPECIFIED WHETHER LONG TERM INSULIN USE (HCC): ICD-10-CM

## 2020-04-21 DIAGNOSIS — R29.898 WEAKNESS OF BOTH LOWER EXTREMITIES: Primary | ICD-10-CM

## 2020-04-21 DIAGNOSIS — G62.9 NEUROPATHY: ICD-10-CM

## 2020-04-21 PROCEDURE — 99214 OFFICE O/P EST MOD 30 MIN: CPT | Performed by: PHYSICIAN ASSISTANT

## 2020-04-21 PROCEDURE — 3008F BODY MASS INDEX DOCD: CPT | Performed by: PHYSICIAN ASSISTANT

## 2020-04-21 PROCEDURE — 3008F BODY MASS INDEX DOCD: CPT | Performed by: INTERNAL MEDICINE

## 2020-04-21 RX ORDER — GABAPENTIN 400 MG/1
CAPSULE ORAL
Qty: 90 CAPSULE | Refills: 1 | Status: SHIPPED | OUTPATIENT
Start: 2020-04-21 | End: 2020-04-22

## 2020-04-21 RX ORDER — DEXTROAMPHETAMINE SACCHARATE, AMPHETAMINE ASPARTATE MONOHYDRATE, DEXTROAMPHETAMINE SULFATE AND AMPHETAMINE SULFATE 5; 5; 5; 5 MG/1; MG/1; MG/1; MG/1
20 CAPSULE, EXTENDED RELEASE ORAL 2 TIMES DAILY
Qty: 60 CAPSULE | Refills: 0 | Status: SHIPPED | OUTPATIENT
Start: 2020-04-21 | End: 2020-05-19 | Stop reason: SDUPTHER

## 2020-04-21 RX ORDER — OXYCODONE AND ACETAMINOPHEN 7.5; 325 MG/1; MG/1
1 TABLET ORAL EVERY 6 HOURS PRN
Qty: 120 TABLET | Refills: 0 | Status: SHIPPED | OUTPATIENT
Start: 2020-04-21 | End: 2020-05-19 | Stop reason: SDUPTHER

## 2020-04-21 RX ORDER — FAMOTIDINE 20 MG/1
TABLET, FILM COATED ORAL
Qty: 180 TABLET | Refills: 0 | Status: SHIPPED | OUTPATIENT
Start: 2020-04-21 | End: 2020-04-22

## 2020-04-22 ENCOUNTER — TELEMEDICINE (OUTPATIENT)
Dept: NEPHROLOGY | Facility: CLINIC | Age: 50
End: 2020-04-22
Payer: COMMERCIAL

## 2020-04-22 VITALS — SYSTOLIC BLOOD PRESSURE: 150 MMHG | DIASTOLIC BLOOD PRESSURE: 90 MMHG

## 2020-04-22 DIAGNOSIS — M31.7 MPA (MICROSCOPIC POLYANGIITIS) (HCC): Chronic | ICD-10-CM

## 2020-04-22 DIAGNOSIS — E11.22 TYPE 2 DIABETES MELLITUS WITH STAGE 4 CHRONIC KIDNEY DISEASE, UNSPECIFIED WHETHER LONG TERM INSULIN USE (HCC): ICD-10-CM

## 2020-04-22 DIAGNOSIS — D63.8 ANEMIA OF CHRONIC DISEASE: ICD-10-CM

## 2020-04-22 DIAGNOSIS — N18.4 TYPE 2 DIABETES MELLITUS WITH STAGE 4 CHRONIC KIDNEY DISEASE, UNSPECIFIED WHETHER LONG TERM INSULIN USE (HCC): ICD-10-CM

## 2020-04-22 DIAGNOSIS — R80.1 PERSISTENT PROTEINURIA: ICD-10-CM

## 2020-04-22 DIAGNOSIS — I12.9 BENIGN HYPERTENSION WITH CKD (CHRONIC KIDNEY DISEASE) STAGE IV (HCC): ICD-10-CM

## 2020-04-22 DIAGNOSIS — N18.4 CKD (CHRONIC KIDNEY DISEASE) STAGE 4, GFR 15-29 ML/MIN (HCC): Primary | ICD-10-CM

## 2020-04-22 DIAGNOSIS — N18.4 BENIGN HYPERTENSION WITH CKD (CHRONIC KIDNEY DISEASE) STAGE IV (HCC): ICD-10-CM

## 2020-04-22 PROCEDURE — 99214 OFFICE O/P EST MOD 30 MIN: CPT | Performed by: INTERNAL MEDICINE

## 2020-04-30 ENCOUNTER — TELEPHONE (OUTPATIENT)
Dept: FAMILY MEDICINE CLINIC | Facility: CLINIC | Age: 50
End: 2020-04-30

## 2020-05-07 DIAGNOSIS — K31.84 GASTROPARESIS: ICD-10-CM

## 2020-05-08 RX ORDER — ONDANSETRON 4 MG/1
4 TABLET, FILM COATED ORAL EVERY 8 HOURS PRN
Qty: 30 TABLET | Refills: 2 | Status: SHIPPED | OUTPATIENT
Start: 2020-05-08 | End: 2020-09-24 | Stop reason: SDUPTHER

## 2020-05-11 ENCOUNTER — TELEPHONE (OUTPATIENT)
Dept: FAMILY MEDICINE CLINIC | Facility: CLINIC | Age: 50
End: 2020-05-11

## 2020-05-14 ENCOUNTER — TELEPHONE (OUTPATIENT)
Dept: FAMILY MEDICINE CLINIC | Facility: CLINIC | Age: 50
End: 2020-05-14

## 2020-05-18 ENCOUNTER — TELEMEDICINE (OUTPATIENT)
Dept: FAMILY MEDICINE CLINIC | Facility: CLINIC | Age: 50
End: 2020-05-18

## 2020-05-18 DIAGNOSIS — R29.898 WEAKNESS OF BOTH UPPER EXTREMITIES: ICD-10-CM

## 2020-05-18 DIAGNOSIS — M25.50 ARTHRALGIA OF MULTIPLE JOINTS: ICD-10-CM

## 2020-05-18 DIAGNOSIS — G62.9 NEUROPATHY: Primary | ICD-10-CM

## 2020-05-18 DIAGNOSIS — B37.9 CANDIDIASIS: ICD-10-CM

## 2020-05-18 DIAGNOSIS — K59.00 CONSTIPATION, UNSPECIFIED CONSTIPATION TYPE: Primary | ICD-10-CM

## 2020-05-18 DIAGNOSIS — R29.898 WEAKNESS OF BOTH LOWER EXTREMITIES: ICD-10-CM

## 2020-05-18 PROCEDURE — G2012 BRIEF CHECK IN BY MD/QHP: HCPCS | Performed by: PHYSICIAN ASSISTANT

## 2020-05-18 RX ORDER — DOCUSATE SODIUM 100 MG/1
100 CAPSULE, LIQUID FILLED ORAL 2 TIMES DAILY
Qty: 180 CAPSULE | Refills: 0 | Status: SHIPPED | OUTPATIENT
Start: 2020-05-18 | End: 2020-09-21 | Stop reason: SDUPTHER

## 2020-05-18 RX ORDER — POLYETHYLENE GLYCOL 3350 17 G/17G
17 POWDER, FOR SOLUTION ORAL DAILY
Qty: 510 G | Refills: 2 | Status: SHIPPED | OUTPATIENT
Start: 2020-05-18 | End: 2020-08-13 | Stop reason: SDUPTHER

## 2020-05-19 DIAGNOSIS — M31.30 WEGENER'S GRANULOMATOSIS: Chronic | ICD-10-CM

## 2020-05-19 DIAGNOSIS — G89.4 CHRONIC PAIN SYNDROME: ICD-10-CM

## 2020-05-19 DIAGNOSIS — F31.9 BIPOLAR 1 DISORDER (HCC): ICD-10-CM

## 2020-05-19 RX ORDER — OXYCODONE AND ACETAMINOPHEN 7.5; 325 MG/1; MG/1
1 TABLET ORAL EVERY 6 HOURS PRN
Qty: 120 TABLET | Refills: 0 | Status: SHIPPED | OUTPATIENT
Start: 2020-05-19 | End: 2020-06-15 | Stop reason: SDUPTHER

## 2020-05-19 RX ORDER — DEXTROAMPHETAMINE SACCHARATE, AMPHETAMINE ASPARTATE MONOHYDRATE, DEXTROAMPHETAMINE SULFATE AND AMPHETAMINE SULFATE 5; 5; 5; 5 MG/1; MG/1; MG/1; MG/1
20 CAPSULE, EXTENDED RELEASE ORAL 2 TIMES DAILY
Qty: 60 CAPSULE | Refills: 0 | Status: SHIPPED | OUTPATIENT
Start: 2020-05-19 | End: 2020-06-15 | Stop reason: SDUPTHER

## 2020-05-19 RX ORDER — NYSTATIN 100000 [USP'U]/G
POWDER TOPICAL
Qty: 60 G | Refills: 1 | Status: SHIPPED | OUTPATIENT
Start: 2020-05-19 | End: 2020-07-13

## 2020-05-20 ENCOUNTER — TELEPHONE (OUTPATIENT)
Dept: FAMILY MEDICINE CLINIC | Facility: CLINIC | Age: 50
End: 2020-05-20

## 2020-05-26 ENCOUNTER — TELEPHONE (OUTPATIENT)
Dept: FAMILY MEDICINE CLINIC | Facility: CLINIC | Age: 50
End: 2020-05-26

## 2020-05-27 ENCOUNTER — TELEPHONE (OUTPATIENT)
Dept: INTERNAL MEDICINE CLINIC | Facility: OTHER | Age: 50
End: 2020-05-27

## 2020-06-08 DIAGNOSIS — G89.29 CHRONIC LOW BACK PAIN WITH BILATERAL SCIATICA, UNSPECIFIED BACK PAIN LATERALITY: ICD-10-CM

## 2020-06-08 DIAGNOSIS — M54.42 CHRONIC LOW BACK PAIN WITH BILATERAL SCIATICA, UNSPECIFIED BACK PAIN LATERALITY: ICD-10-CM

## 2020-06-08 DIAGNOSIS — M54.41 CHRONIC LOW BACK PAIN WITH BILATERAL SCIATICA, UNSPECIFIED BACK PAIN LATERALITY: ICD-10-CM

## 2020-06-09 RX ORDER — TIZANIDINE HYDROCHLORIDE 4 MG/1
CAPSULE, GELATIN COATED ORAL
Qty: 90 CAPSULE | Refills: 1 | Status: SHIPPED | OUTPATIENT
Start: 2020-06-09 | End: 2020-09-24 | Stop reason: SDUPTHER

## 2020-06-15 DIAGNOSIS — F31.9 BIPOLAR 1 DISORDER (HCC): ICD-10-CM

## 2020-06-15 DIAGNOSIS — M31.30 WEGENER'S GRANULOMATOSIS: Chronic | ICD-10-CM

## 2020-06-15 DIAGNOSIS — G89.4 CHRONIC PAIN SYNDROME: ICD-10-CM

## 2020-06-16 RX ORDER — OXYCODONE AND ACETAMINOPHEN 7.5; 325 MG/1; MG/1
1 TABLET ORAL EVERY 6 HOURS PRN
Qty: 120 TABLET | Refills: 0 | Status: SHIPPED | OUTPATIENT
Start: 2020-06-16 | End: 2020-07-10 | Stop reason: SDUPTHER

## 2020-06-16 RX ORDER — DEXTROAMPHETAMINE SACCHARATE, AMPHETAMINE ASPARTATE MONOHYDRATE, DEXTROAMPHETAMINE SULFATE AND AMPHETAMINE SULFATE 5; 5; 5; 5 MG/1; MG/1; MG/1; MG/1
20 CAPSULE, EXTENDED RELEASE ORAL 2 TIMES DAILY
Qty: 60 CAPSULE | Refills: 0 | Status: SHIPPED | OUTPATIENT
Start: 2020-06-16 | End: 2020-07-13 | Stop reason: SDUPTHER

## 2020-06-22 ENCOUNTER — TELEMEDICINE (OUTPATIENT)
Dept: FAMILY MEDICINE CLINIC | Facility: CLINIC | Age: 50
End: 2020-06-22

## 2020-06-22 DIAGNOSIS — G47.411 CATAPLEXY: Primary | ICD-10-CM

## 2020-06-22 DIAGNOSIS — M31.31 GRANULOMATOSIS WITH POLYANGIITIS WITH RENAL INVOLVEMENT (HCC): Chronic | ICD-10-CM

## 2020-06-22 DIAGNOSIS — R29.898 WEAKNESS OF BOTH LOWER EXTREMITIES: ICD-10-CM

## 2020-06-22 DIAGNOSIS — G62.9 SMALL FIBER NEUROPATHY: ICD-10-CM

## 2020-06-22 PROCEDURE — 99214 OFFICE O/P EST MOD 30 MIN: CPT | Performed by: PHYSICIAN ASSISTANT

## 2020-06-23 ENCOUNTER — TELEPHONE (OUTPATIENT)
Dept: FAMILY MEDICINE CLINIC | Facility: CLINIC | Age: 50
End: 2020-06-23

## 2020-06-26 ENCOUNTER — TELEMEDICINE (OUTPATIENT)
Dept: FAMILY MEDICINE CLINIC | Facility: CLINIC | Age: 50
End: 2020-06-26

## 2020-06-26 ENCOUNTER — TELEPHONE (OUTPATIENT)
Dept: FAMILY MEDICINE CLINIC | Facility: CLINIC | Age: 50
End: 2020-06-26

## 2020-06-26 ENCOUNTER — OFFICE VISIT (OUTPATIENT)
Dept: FAMILY MEDICINE CLINIC | Facility: CLINIC | Age: 50
End: 2020-06-26

## 2020-06-26 VITALS — DIASTOLIC BLOOD PRESSURE: 93 MMHG | SYSTOLIC BLOOD PRESSURE: 144 MMHG | HEART RATE: 94 BPM

## 2020-06-26 VITALS
TEMPERATURE: 98.1 F | OXYGEN SATURATION: 97 % | RESPIRATION RATE: 18 BRPM | HEART RATE: 84 BPM | SYSTOLIC BLOOD PRESSURE: 116 MMHG | DIASTOLIC BLOOD PRESSURE: 72 MMHG

## 2020-06-26 DIAGNOSIS — R29.898 WEAKNESS OF BOTH LOWER EXTREMITIES: Primary | ICD-10-CM

## 2020-06-26 DIAGNOSIS — R29.898 WEAKNESS OF BOTH LOWER EXTREMITIES: ICD-10-CM

## 2020-06-26 DIAGNOSIS — G62.9 NEUROPATHY: ICD-10-CM

## 2020-06-26 DIAGNOSIS — G47.411 CATAPLEXY: Primary | ICD-10-CM

## 2020-06-26 DIAGNOSIS — G47.411 CATAPLEXY: ICD-10-CM

## 2020-06-26 PROCEDURE — 1036F TOBACCO NON-USER: CPT | Performed by: PHYSICIAN ASSISTANT

## 2020-06-26 PROCEDURE — 3078F DIAST BP <80 MM HG: CPT | Performed by: PHYSICIAN ASSISTANT

## 2020-06-26 PROCEDURE — G2012 BRIEF CHECK IN BY MD/QHP: HCPCS | Performed by: FAMILY MEDICINE

## 2020-06-26 PROCEDURE — 99214 OFFICE O/P EST MOD 30 MIN: CPT | Performed by: PHYSICIAN ASSISTANT

## 2020-06-26 PROCEDURE — 3074F SYST BP LT 130 MM HG: CPT | Performed by: PHYSICIAN ASSISTANT

## 2020-06-26 PROCEDURE — 3066F NEPHROPATHY DOC TX: CPT | Performed by: PHYSICIAN ASSISTANT

## 2020-07-08 ENCOUNTER — TELEPHONE (OUTPATIENT)
Dept: FAMILY MEDICINE CLINIC | Facility: CLINIC | Age: 50
End: 2020-07-08

## 2020-07-08 NOTE — TELEPHONE ENCOUNTER
Pt LM on nurse line requesting a call back to answer a medication question  Pt advised me she has been having to take 6 tablets of percocet due to the severity of her pain and is running out because the Rx is written for a max of 4 daily  Pt states she has to take percocet about every 3-4 hours  Pt would like to know if she can get some help obtaining some more  Please advise clinical pool, thanks so much

## 2020-07-09 ENCOUNTER — TELEPHONE (OUTPATIENT)
Dept: FAMILY MEDICINE CLINIC | Facility: CLINIC | Age: 50
End: 2020-07-09

## 2020-07-09 DIAGNOSIS — G89.4 CHRONIC PAIN SYNDROME: ICD-10-CM

## 2020-07-09 DIAGNOSIS — M31.30 WEGENER'S GRANULOMATOSIS: Chronic | ICD-10-CM

## 2020-07-09 NOTE — TELEPHONE ENCOUNTER
Order for power wheelchair was faxed to [facility] at [phone] [facility] Nayan Quiros

## 2020-07-10 DIAGNOSIS — B37.9 CANDIDIASIS: ICD-10-CM

## 2020-07-10 RX ORDER — OXYCODONE AND ACETAMINOPHEN 7.5; 325 MG/1; MG/1
1 TABLET ORAL EVERY 4 HOURS PRN
Qty: 150 TABLET | Refills: 0 | Status: SHIPPED | OUTPATIENT
Start: 2020-07-10 | End: 2020-08-10 | Stop reason: SDUPTHER

## 2020-07-10 NOTE — TELEPHONE ENCOUNTER
Patient called in stating left prior message regarding refill on percocet said down to 1 pill  Pharmacy said not do for refill as of yet  Said in bad pain had to take more then prescribed

## 2020-07-10 NOTE — TELEPHONE ENCOUNTER
Pt called very upset stating she can not  her percocet and is asking why  Called pharmacy  Pharmacy states it is too early for pt to  this medication, and need a reason and permission for it to be filled early   Please advise

## 2020-07-10 NOTE — TELEPHONE ENCOUNTER
Good morning Katarzyna,    I had routed pt's request to you a couple days ago since her next f/u appt for her chronic pain is with you  Please read previous msg and advise  Thanks so much

## 2020-07-10 NOTE — TELEPHONE ENCOUNTER
Contacted pharmacy and advised them new Rx was sent because pt needs a little more help frequently with pain  Spoke with Guevara / pharmacist and he advised me pt's surgeon also sent them a Rx and advised them to fill the 10 tablets for the pt so they are holding Rx from [facility] for a later time  Pharmacist advised me pt is aware of this and they are delivering the Rx today  I spoke with pt and confirmed she was aware she is only getting 10 tablets delivered from her surgeon  Pt confirmed she is aware that Katarzyna's Rx will be held until it is due to be filled  Pt had no other questions or concerns at the time

## 2020-07-10 NOTE — TELEPHONE ENCOUNTER
I have placed a new prescription indicating patient can take one tablet every 4-5 hours, and to not to exceed 5 tablets in one day  Thanks!

## 2020-07-13 DIAGNOSIS — F31.9 BIPOLAR 1 DISORDER (HCC): ICD-10-CM

## 2020-07-13 RX ORDER — DEXTROAMPHETAMINE SACCHARATE, AMPHETAMINE ASPARTATE MONOHYDRATE, DEXTROAMPHETAMINE SULFATE AND AMPHETAMINE SULFATE 5; 5; 5; 5 MG/1; MG/1; MG/1; MG/1
20 CAPSULE, EXTENDED RELEASE ORAL 2 TIMES DAILY
Qty: 60 CAPSULE | Refills: 0 | Status: SHIPPED | OUTPATIENT
Start: 2020-07-13 | End: 2020-08-10 | Stop reason: SDUPTHER

## 2020-07-13 RX ORDER — NYSTATIN 100000 [USP'U]/G
POWDER TOPICAL
Qty: 60 G | Refills: 0 | Status: SHIPPED | OUTPATIENT
Start: 2020-07-13 | End: 2020-08-24 | Stop reason: SDUPTHER

## 2020-07-14 ENCOUNTER — TELEPHONE (OUTPATIENT)
Dept: FAMILY MEDICINE CLINIC | Facility: CLINIC | Age: 50
End: 2020-07-14

## 2020-07-15 ENCOUNTER — TELEPHONE (OUTPATIENT)
Dept: FAMILY MEDICINE CLINIC | Facility: CLINIC | Age: 50
End: 2020-07-15

## 2020-07-15 NOTE — TELEPHONE ENCOUNTER
SIGNATURES NEEDED FOR [employer] FORM RECEIVED VIA FAX  WILL BE PLACED IN YOUR BIN AT ASSIGNED DELIVERY TIMES        ORDER # 1 [phone] 0

## 2020-07-16 NOTE — TELEPHONE ENCOUNTER
Faxed [facility] Torres to the following fax number [phone] I did receive confirmation, In process to be scanned into the pt chart

## 2020-07-23 ENCOUNTER — APPOINTMENT (EMERGENCY)
Dept: CT IMAGING | Facility: HOSPITAL | Age: 50
End: 2020-07-23
Payer: COMMERCIAL

## 2020-07-23 ENCOUNTER — HOSPITAL ENCOUNTER (EMERGENCY)
Facility: HOSPITAL | Age: 50
Discharge: HOME | End: 2020-07-23
Attending: EMERGENCY MEDICINE
Payer: COMMERCIAL

## 2020-07-23 VITALS
RESPIRATION RATE: 17 BRPM | TEMPERATURE: 98.6 F | SYSTOLIC BLOOD PRESSURE: 114 MMHG | HEART RATE: 72 BPM | OXYGEN SATURATION: 99 % | DIASTOLIC BLOOD PRESSURE: 65 MMHG

## 2020-07-23 DIAGNOSIS — K85.90 PANCREATITIS: Primary | ICD-10-CM

## 2020-07-23 LAB
ALBUMIN SERPL BCP-MCNC: 3.3 G/DL (ref 3.5–5)
ALP SERPL-CCNC: 127 U/L (ref 46–116)
ALT SERPL W P-5'-P-CCNC: 17 U/L (ref 12–78)
ANION GAP SERPL CALCULATED.3IONS-SCNC: 12 MMOL/L (ref 4–13)
AST SERPL W P-5'-P-CCNC: 13 U/L (ref 5–45)
BASOPHILS # BLD AUTO: 0.02 THOUSANDS/ΜL (ref 0–0.1)
BASOPHILS NFR BLD AUTO: 0 % (ref 0–1)
BILIRUB SERPL-MCNC: 0.17 MG/DL (ref 0.2–1)
BUN SERPL-MCNC: 28 MG/DL (ref 5–25)
CALCIUM SERPL-MCNC: 8.8 MG/DL (ref 8.3–10.1)
CHLORIDE SERPL-SCNC: 106 MMOL/L (ref 100–108)
CO2 SERPL-SCNC: 22 MMOL/L (ref 21–32)
CREAT SERPL-MCNC: 2.08 MG/DL (ref 0.6–1.3)
EOSINOPHIL # BLD AUTO: 0.03 THOUSAND/ΜL (ref 0–0.61)
EOSINOPHIL NFR BLD AUTO: 1 % (ref 0–6)
ERYTHROCYTE [DISTWIDTH] IN BLOOD BY AUTOMATED COUNT: 12.5 % (ref 11.6–15.1)
GFR SERPL CREATININE-BSD FRML MDRD: 27 ML/MIN/1.73SQ M
GLUCOSE SERPL-MCNC: 117 MG/DL (ref 65–140)
HCT VFR BLD AUTO: 39 % (ref 34.8–46.1)
HGB BLD-MCNC: 12.6 G/DL (ref 11.5–15.4)
IMM GRANULOCYTES # BLD AUTO: 0.01 THOUSAND/UL (ref 0–0.2)
IMM GRANULOCYTES NFR BLD AUTO: 0 % (ref 0–2)
LIPASE SERPL-CCNC: 1167 U/L (ref 73–393)
LYMPHOCYTES # BLD AUTO: 0.8 THOUSANDS/ΜL (ref 0.6–4.47)
LYMPHOCYTES NFR BLD AUTO: 13 % (ref 14–44)
MCH RBC QN AUTO: 31 PG (ref 26.8–34.3)
MCHC RBC AUTO-ENTMCNC: 32.3 G/DL (ref 31.4–37.4)
MCV RBC AUTO: 96 FL (ref 82–98)
MONOCYTES # BLD AUTO: 0.29 THOUSAND/ΜL (ref 0.17–1.22)
MONOCYTES NFR BLD AUTO: 5 % (ref 4–12)
NEUTROPHILS # BLD AUTO: 5.12 THOUSANDS/ΜL (ref 1.85–7.62)
NEUTS SEG NFR BLD AUTO: 81 % (ref 43–75)
NRBC BLD AUTO-RTO: 0 /100 WBCS
PLATELET # BLD AUTO: 225 THOUSANDS/UL (ref 149–390)
PMV BLD AUTO: 9.9 FL (ref 8.9–12.7)
POTASSIUM SERPL-SCNC: 4.4 MMOL/L (ref 3.5–5.3)
PROT SERPL-MCNC: 7.5 G/DL (ref 6.4–8.2)
RBC # BLD AUTO: 4.07 MILLION/UL (ref 3.81–5.12)
SODIUM SERPL-SCNC: 140 MMOL/L (ref 136–145)
WBC # BLD AUTO: 6.27 THOUSAND/UL (ref 4.31–10.16)

## 2020-07-23 PROCEDURE — 83690 ASSAY OF LIPASE: CPT | Performed by: EMERGENCY MEDICINE

## 2020-07-23 PROCEDURE — 96361 HYDRATE IV INFUSION ADD-ON: CPT

## 2020-07-23 PROCEDURE — 99284 EMERGENCY DEPT VISIT MOD MDM: CPT

## 2020-07-23 PROCEDURE — 99285 EMERGENCY DEPT VISIT HI MDM: CPT | Performed by: EMERGENCY MEDICINE

## 2020-07-23 PROCEDURE — 85025 COMPLETE CBC W/AUTO DIFF WBC: CPT | Performed by: EMERGENCY MEDICINE

## 2020-07-23 PROCEDURE — 36415 COLL VENOUS BLD VENIPUNCTURE: CPT | Performed by: EMERGENCY MEDICINE

## 2020-07-23 PROCEDURE — 96374 THER/PROPH/DIAG INJ IV PUSH: CPT

## 2020-07-23 PROCEDURE — 74176 CT ABD & PELVIS W/O CONTRAST: CPT

## 2020-07-23 PROCEDURE — 96375 TX/PRO/DX INJ NEW DRUG ADDON: CPT

## 2020-07-23 PROCEDURE — 80053 COMPREHEN METABOLIC PANEL: CPT | Performed by: EMERGENCY MEDICINE

## 2020-07-23 RX ORDER — DIPHENHYDRAMINE HYDROCHLORIDE 50 MG/ML
25 INJECTION INTRAMUSCULAR; INTRAVENOUS ONCE
Status: COMPLETED | OUTPATIENT
Start: 2020-07-23 | End: 2020-07-23

## 2020-07-23 RX ORDER — METOCLOPRAMIDE HYDROCHLORIDE 5 MG/ML
10 INJECTION INTRAMUSCULAR; INTRAVENOUS ONCE
Status: COMPLETED | OUTPATIENT
Start: 2020-07-23 | End: 2020-07-23

## 2020-07-23 RX ORDER — FENTANYL CITRATE 50 UG/ML
50 INJECTION, SOLUTION INTRAMUSCULAR; INTRAVENOUS ONCE
Status: COMPLETED | OUTPATIENT
Start: 2020-07-23 | End: 2020-07-23

## 2020-07-23 RX ORDER — ONDANSETRON 2 MG/ML
4 INJECTION INTRAMUSCULAR; INTRAVENOUS ONCE
Status: COMPLETED | OUTPATIENT
Start: 2020-07-23 | End: 2020-07-23

## 2020-07-23 RX ADMIN — SODIUM CHLORIDE 1000 ML: 0.9 INJECTION, SOLUTION INTRAVENOUS at 11:49

## 2020-07-23 RX ADMIN — ONDANSETRON 4 MG: 2 INJECTION INTRAMUSCULAR; INTRAVENOUS at 10:24

## 2020-07-23 RX ADMIN — DIPHENHYDRAMINE HYDROCHLORIDE 25 MG: 50 INJECTION, SOLUTION INTRAMUSCULAR; INTRAVENOUS at 10:26

## 2020-07-23 RX ADMIN — FENTANYL CITRATE 50 MCG: 50 INJECTION, SOLUTION INTRAMUSCULAR; INTRAVENOUS at 10:29

## 2020-07-23 RX ADMIN — SODIUM CHLORIDE 1000 ML: 0.9 INJECTION, SOLUTION INTRAVENOUS at 10:23

## 2020-07-23 RX ADMIN — METOCLOPRAMIDE 10 MG: 5 INJECTION, SOLUTION INTRAMUSCULAR; INTRAVENOUS at 10:27

## 2020-07-23 NOTE — ED PROVIDER NOTES
Pt Name: Juan Antonio Barbosa  MRN: [id number]  [place] 1970  Age/Sex: 52 y o  female  Date of evaluation: 7/23/2020  PCP: No primary care provider on file  CHIEF COMPLAINT    Chief Complaint   Patient presents with    Abdominal Pain     patient reports abdominal pain with N/V/D that started a couple of days ago; patient has a gastric stimulator          HPI    Annette Murray presents to the Emergency Department complaining of abdominal pain and vomiting  She has a hx of gastroparesis and had a medtronic gastric stimulator placed at [facility] a month ago  Since then she has not had vomiting until yesterday  She has a hx of pancreatitis that she believes is from taking Bactrim  No fever  No diarrhea          HPI      Past Medical and Surgical History    Past Medical History:   Diagnosis Date    ADHD     Anemia of chronic disease     Anxiety     Asthma     Asthma     Borderline personality disorder ([facility] [address] )     Cataplexy     Chronic abdominal pain     CKD (chronic kidney disease) stage 3, GFR 30-59 ml/min (HCC)     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[facility] [address] )     Cushing syndrome ([address]     Diabetes mellitus ([address]     DVT (deep venous thrombosis) (HCC)     GERD (gastroesophageal reflux disease)     History of acute pancreatitis     felt secondary to Bactrim    History of transfusion     HTN (hypertension)     Hypertension     Liver disease     fatty liver    Microscopic polyangiitis (HCC)     Morbid obesity (HCC)     MPA (microscopic polyangiitis) (HCC)     Ovarian cyst     PTSD (post-traumatic stress disorder)     Renal disorder     Self-inflicted injury     self inflicted skin wounds    Wegener's granulomatosis with renal involvement ([facility] [address] ) 2015       Past Surgical History:   Procedure Laterality Date    ESOPHAGOGASTRODUODENOSCOPY  09/11/2015    mild antral gastritis    GASTRIC STIMULATOR IMPLANT SURGERY  06/25/2020    HI COLONOSCOPY FLX DX W/COLLJ SPEC WHEN PFRMD N/A 12/14/2018    Procedure: COLONOSCOPY with polypectomy;  Surgeon: Ishaan Skaggs MD;  Location: [facility]; Service: Gastroenterology    [place] ESOPHAGOGASTRODUODENOSCOPY TRANSORAL DIAGNOSTIC N/A 2018    Procedure: ESOPHAGOGASTRODUODENOSCOPY (EGD) with biopsy;  Surgeon: Ishaan Skaggs MD;  Location: [facility]; Service: Gastroenterology    RELEASE SCAR CONTRACTURE / GRAFT REPAIRS OF HAND Bilateral        Family History   Problem Relation Age of Onset    No Known Problems Mother     No Known Problems Father     Colon cancer Neg Hx     Drug abuse Neg Hx         mother father    Mental illness Neg Hx         disorder, mother father    Cancer Neg Hx     Breast cancer Neg Hx        Social History     Tobacco Use    Smoking status: Former Smoker     Last attempt to quit:      Years since quittin 5    Smokeless tobacco: Never Used    Tobacco comment: marijuana   Substance Use Topics    Alcohol use: No     Frequency: Never    Drug use: Yes     Types: Marijuana     Comment: marijuana daily           Allergies    Allergies   Allergen Reactions    Prozac [Fluoxetine Hcl]      SI    Bactrim [Sulfamethoxazole-Trimethoprim]      Pt "They think that is what cause the pancreatitis"     Lamictal [Lamotrigine] GI Intolerance    Lithium Other (See Comments)    Amlodipine Hives    Haldol [Haloperidol] Other (See Comments)     "I don't like it"    Ibuprofen     Lexapro [Escitalopram Oxalate] Rash    Navane [Thiothixene]      SI    Other      "novaine?" antipsychotic       Home Medications    Prior to Admission medications    Medication Sig Start Date End Date Taking?  Authorizing Provider   albuterol (VENTOLIN HFA) 90 mcg/act inhaler Inhale 2 puffs every 6 (six) hours as needed for wheezing 18  Yes Salvador Ann PA-C   amLODIPine (NORVASC) 5 mg tablet Take 1 tablet (5 mg total) by mouth daily 20  Yes Phu Gregory,    amphetamine-dextroamphetamine (ADDERALL XR) 20 MG 24 hr capsule Take 1 capsule (20 mg total) by mouth 2 (two) times a dayMax Daily Amount: 40 mg 7/13/20  Yes Katarzyna Thrasher PA-C   buPROPion (WELLBUTRIN XL) 150 mg 24 hr tablet bupropion HCl  mg 24 hr tablet, extended release   Yes Historical Provider, MD   cycloSPORINE (RESTASIS) 0 05 % ophthalmic emulsion Administer 1 drop to both eyes 2 (two) times a day   Yes Historical Provider, MD   docusate sodium (COLACE) 100 mg capsule Take 1 capsule (100 mg total) by mouth 2 (two) times a day 5/18/20  Yes Jamilah Woodard PA-C   ondansetron [facility]) 4 mg tablet Take 1 tablet (4 mg total) by mouth every 8 (eight) hours as needed for nausea or vomiting 5/8/20  Yes Jamilah Woodard PA-C   oxyCODONE-acetaminophen (PERCOCET) 7 5-325 MG per tablet Take 1 tablet by mouth every 4 (four) hours as needed for severe painMax Daily Amount: 5 tablets 7/10/20  Yes Katarzyna Thrasher PA-C   polyethylene glycol (GLYCOLAX) 17 GM/SCOOP powder Take 17 g by mouth daily 5/18/20  Yes Jamilah Woodard PA-C   Probiotic Product (PROBIOTIC-10 PO) Take 1 tablet by mouth daily   Yes Historical Provider, MD   QUEtiapine (SEROquel) 100 mg tablet Take 1 tablet (100 mg total) by mouth daily at bedtime 2/28/20  Yes Jamilah Woodard PA-C   scopolamine (TRANSDERM-SCOP) 1 5 mg/3 days TD 72 hr patch Place 1 patch on the skin every third day 3/17/20  Yes Julius Li PA-C   TiZANidine (ZANAFLEX) 4 MG capsule TAKE ONE CAPSULE BY MOUTH THREE TIMES A DAY 6/9/20  Yes Jamilah Woodard PA-C   TOUJEO MAX SOLOSTAR 300 units/mL CONCETRATED U-300 injection pen INJECT 22 UNITS UNDER THE SKIN DAILY 8/6/19  Yes Jamilah Woodard PA-C   Alcohol Swabs (ALCOHOL PADS) 70 % PADS by Does not apply route 4 (four) times a day 6/7/19   Jamilah Woodard PA-C   Blood Glucose Monitoring Suppl (FREESTYLE LITE) DEIRDRE by Does not apply route daily 5/21/19   Jamilah Woodard PA-C   dicyclomine (BENTYL) 20 mg tablet Take 1 tablet (20 mg total) by mouth 2 (two) times a day for 5 days 11/7/19 11/12/19  Ren Arambula MD   Disposable Gloves MISC by Does not apply route 4 (four) times a day As needed for hygiene  Patient not taking: Reported on 2/28/2020 10/21/19   Rex Garber PA-C   Easy Comfort Lancets MISC USE THREE TIMES A DAY 2/25/20   Rex Garber PA-C   FREESTYLE LITE test strip TEST THREE TIMES A DAY AS DIRECTED 9/4/19   Rex Garber PA-C   Humidifiers (COOL MIST HUMIDIFIER 1 GALLON) MISC by Does not apply route as needed (shortness of breath)  Patient not taking: Reported on 2/28/2020 2/27/18   Rex Garber PA-C   Insulin Pen Needle (PEN NEEDLES) 31G X 8 MM MISC Inject 1 Stick under the skin 4 (four) times a day (with meals and at bedtime) 3/28/18   Rex Garber PA-C   lidocaine (XYLOCAINE) 5 % ointment Apply topically 2 (two) times a day as needed for mild pain 12/11/19   Rex Garber PA-C   Mouthwashes (BIOTENE/CALCIUM PBF) LIQD Apply 15 mL to the mouth or throat 2 (two) times a day Swish for 30 seconds then spit out  2/28/20   Rex Garber PA-C   NOVOLOG FLEXPEN 100 units/mL SOPN INJECT 6 UNITS UNDER THE SKIN THREE (THREE) TIMES A DAY WITH MEALS 3/3/20   Rex Garber PA-C   NYSTATIN powder APPLY TOPICALLY TWICE A DAY TO TO AFFECTED AREAS 7/13/20   Katarzyna Thrasher PA-C           Review of Systems    Review of Systems   Constitutional: Positive for appetite change  Negative for activity change, chills, diaphoresis, fatigue and fever  HENT: Negative for congestion, postnasal drip, rhinorrhea, sinus pressure, sneezing and sore throat  Eyes: Negative for pain and visual disturbance  Respiratory: Negative for cough, chest tightness and shortness of breath  Cardiovascular: Negative for chest pain, palpitations and leg swelling  Gastrointestinal: Positive for abdominal pain, nausea and vomiting  Negative for abdominal distention, constipation and diarrhea  Endocrine: Negative for polydipsia, polyphagia and polyuria     Genitourinary: Negative for decreased urine volume, difficulty urinating, dysuria, flank pain, frequency and hematuria  Musculoskeletal: Negative for arthralgias, gait problem, joint swelling and neck pain  Skin: Negative for pallor and rash  Allergic/Immunologic: Negative for immunocompromised state  Neurological: Negative for syncope, speech difficulty, weakness, light-headedness, numbness and headaches  All other systems reviewed and are negative  Physical Exam      ED Triage Vitals   Temperature Pulse Respirations Blood Pressure SpO2   07/23/20 0903 07/23/20 0903 07/23/20 0903 07/23/20 0903 07/23/20 0903   98 8 °F (37 1 °C) 82 18 127/91 98 %      Temp Source Heart Rate Source Patient Position - Orthostatic VS BP Location FiO2 (%)   07/23/20 0903 07/23/20 0903 07/23/20 0903 07/23/20 0903 --   Temporal Monitor Sitting Right arm       Pain Score       07/23/20 1029       Worst Possible Pain               Physical Exam   Constitutional: She is oriented to person, place, and time  She appears well-developed and well-nourished  No distress  HENT:   Head: Normocephalic and atraumatic  Nose: Nose normal    Mouth/Throat: Oropharynx is clear and moist    Eyes: Pupils are equal, round, and reactive to light  Conjunctivae, EOM and lids are normal    Neck: Normal range of motion  Neck supple  Cardiovascular: Normal rate, regular rhythm and normal heart sounds  Exam reveals no gallop and no friction rub  No murmur heard  Pulmonary/Chest: Effort normal and breath sounds normal  No accessory muscle usage  No respiratory distress  She has no wheezes  She has no rales  Abdominal: Soft  She exhibits no distension  There is no tenderness  There is no rebound and no guarding  Neurological: She is alert and oriented to person, place, and time  No cranial nerve deficit or sensory deficit  Skin: Skin is warm and dry  No rash noted  She is not diaphoretic  No erythema  Psychiatric: She has a normal mood and affect   Her speech is normal and behavior is normal  Judgment and thought content normal    Nursing note and vitals reviewed  Assessment and Plan    Juan Antonio Barbosa is a 52 y o  female who presents with abdominal pain and vomiting  Physical examination remarkable for abdominal tenderness and healing wound  Differential diagnosis (not completely inclusive) includes intra-abdominal pathology  Plan will be to perform diagnostic testing and treat symptomatically        MDM    Diagnostic Results      Labs:    Results for orders placed or performed during the hospital encounter of 07/23/20   CBC and differential   Result Value Ref Range    WBC 6 27 4 31 - 10 16 Thousand/uL    RBC 4 07 3 81 - 5 12 Million/uL    Hemoglobin 12 6 11 5 - 15 4 g/dL    Hematocrit 39 0 34 8 - 46 1 %    MCV 96 82 - 98 fL    MCH 31 0 26 8 - 34 3 pg    MCHC 32 3 31 4 - 37 4 g/dL    RDW 12 5 11 6 - 15 1 %    MPV 9 9 8 9 - 12 7 fL    Platelets 147 965 - 524 Thousands/uL    nRBC 0 /100 WBCs    Neutrophils Relative 81 (H) 43 - 75 %    Immat GRANS % 0 0 - 2 %    Lymphocytes Relative 13 (L) 14 - 44 %    Monocytes Relative 5 4 - 12 %    Eosinophils Relative 1 0 - 6 %    Basophils Relative 0 0 - 1 %    Neutrophils Absolute 5 12 1 85 - 7 62 Thousands/µL    Immature Grans Absolute 0 01 0 00 - 0 20 Thousand/uL    Lymphocytes Absolute 0 80 0 60 - 4 47 Thousands/µL    Monocytes Absolute 0 29 0 17 - 1 22 Thousand/µL    Eosinophils Absolute 0 03 0 00 - 0 61 Thousand/µL    Basophils Absolute 0 02 0 00 - 0 10 Thousands/µL   Comprehensive metabolic panel   Result Value Ref Range    Sodium 140 136 - 145 mmol/L    Potassium 4 4 3 5 - 5 3 mmol/L    Chloride 106 100 - 108 mmol/L    CO2 22 21 - 32 mmol/L    ANION GAP 12 4 - 13 mmol/L    BUN 28 (H) 5 - 25 mg/dL    Creatinine 2 08 (H) 0 60 - 1 30 mg/dL    Glucose 117 65 - 140 mg/dL    Calcium 8 8 8 3 - 10 1 mg/dL    AST 13 5 - 45 U/L    ALT 17 12 - 78 U/L    Alkaline Phosphatase 127 (H) 46 - 116 U/L    Total Protein 7 5 6 4 - 8 2 g/dL    Albumin 3 3 (L) 3 5 - 5 0 g/dL    Total Bilirubin 0 17 (L) 0 20 - 1 00 mg/dL eGFR 27 ml/min/1 73sq m   Lipase   Result Value Ref Range    Lipase 1,167 (H) 73 - 393 u/L       All labs reviewed and utilized in the medical decision making process    Radiology:    CT abdomen pelvis wo contrast   Final Result      No acute intra-abdominal finding  Other findings as described      Workstation performed: [id number]             All radiology studies independently viewed by me and interpreted by the radiologist     Procedure    Procedures      ED Course of Care and Re-Assessments        Medications   metoclopramide (REGLAN) injection 10 mg (10 mg Intravenous Given 7/23/20 1027)   diphenhydrAMINE (BENADRYL) injection 25 mg (25 mg Intravenous Given 7/23/20 1026)   ondansetron (ZOFRAN) injection 4 mg (4 mg Intravenous Given 7/23/20 1024)   fentanyl citrate (PF) 100 MCG/2ML 50 mcg (50 mcg Intravenous Given 7/23/20 1029)   sodium chloride 0 9 % bolus 1,000 mL (0 mL Intravenous Stopped 7/23/20 1247)   sodium chloride 0 9 % bolus 1,000 mL (0 mL Intravenous Stopped 7/23/20 1149)     Patient is feeling better after pain meds and nausea meds  We discussed her elevated lipase with normal CT  She has a hx of pancreatitis and tells me that she knows to drink clears and she has pain and nausea medication at home that she can take  She will return to ER if her pain worsens or for intractable vomiting     FINAL IMPRESSION    Final diagnoses:   Pancreatitis         DISPOSITION/PLAN    Time reflects when diagnosis was documented in both MDM as applicable and the Disposition within this note     Time User Action Codes Description Comment    7/23/2020 11:28 AM Jany Corral [K85 [id number]] Pancreatitis       ED Disposition     ED Disposition Condition Date/Time Comment    Discharge Stable u Jul 23, 2020 11:28 AM Sharon Chambers discharge to home/self care  Follow-up Information    None           PATIENT REFERRED TO:    No follow-up provider specified      DISCHARGE MEDICATIONS:    Discharge Medication List as of 7/23/2020 12:21 PM      CONTINUE these medications which have NOT CHANGED    Details   albuterol (VENTOLIN HFA) 90 mcg/act inhaler Inhale 2 puffs every 6 (six) hours as needed for wheezing, Starting Wed 12/12/2018, Normal      amLODIPine (NORVASC) 5 mg tablet Take 1 tablet (5 mg total) by mouth daily, Starting Fri 2/28/2020, Normal      amphetamine-dextroamphetamine (ADDERALL XR) 20 MG 24 hr capsule Take 1 capsule (20 mg total) by mouth 2 (two) times a dayMax Daily Amount: 40 mg, Starting Mon 7/13/2020, Normal      buPROPion (WELLBUTRIN XL) 150 mg 24 hr tablet bupropion HCl  mg 24 hr tablet, extended release, Historical Med      cycloSPORINE (RESTASIS) 0 05 % ophthalmic emulsion Administer 1 drop to both eyes 2 (two) times a day, Historical Med      docusate sodium (COLACE) 100 mg capsule Take 1 capsule (100 mg total) by mouth 2 (two) times a day, Starting Mon 5/18/2020, Normal      ondansetron (ZOFRAN) 4 mg tablet Take 1 tablet (4 mg total) by mouth every 8 (eight) hours as needed for nausea or vomiting, Starting Fri 5/8/2020, Normal      oxyCODONE-acetaminophen (PERCOCET) 7 5-325 MG per tablet Take 1 tablet by mouth every 4 (four) hours as needed for severe painMax Daily Amount: 5 tablets, Starting Fri 7/10/2020, Normal      polyethylene glycol (GLYCOLAX) 17 GM/SCOOP powder Take 17 g by mouth daily, Starting Mon 5/18/2020, Normal      Probiotic Product (PROBIOTIC-10 PO) Take 1 tablet by mouth daily, Historical Med      QUEtiapine (SEROquel) 100 mg tablet Take 1 tablet (100 mg total) by mouth daily at bedtime, Starting Fri 2/28/2020, Normal      scopolamine (TRANSDERM-SCOP) 1 5 mg/3 days TD 72 hr patch Place 1 patch on the skin every third day, Starting Tue 3/17/2020, Normal      TiZANidine (ZANAFLEX) 4 MG capsule TAKE ONE CAPSULE BY MOUTH THREE TIMES A DAY, Normal      TOUJEO MAX SOLOSTAR 300 units/mL CONCETRATED U-300 injection pen INJECT 22 UNITS UNDER THE SKIN DAILY, Normal Alcohol Swabs (ALCOHOL PADS) 70 % PADS by Does not apply route 4 (four) times a day, Starting Fri 6/7/2019, Normal      Blood Glucose Monitoring Suppl (FREESTYLE LITE) DEIRDRE by Does not apply route daily, Starting Tue 5/21/2019, Normal      dicyclomine (BENTYL) 20 mg tablet Take 1 tablet (20 mg total) by mouth 2 (two) times a day for 5 days, Starting Thu 11/7/2019, Until Tue 11/12/2019, Print      Disposable Gloves MISC by Does not apply route 4 (four) times a day As needed for hygiene, Starting Mon 10/21/2019, Print      Easy Comfort Lancets MISC USE THREE TIMES A DAY, Normal      FREESTYLE LITE test strip TEST THREE TIMES A DAY AS DIRECTED, Normal      Humidifiers (COOL MIST HUMIDIFIER 1 GALLON) MISC by Does not apply route as needed (shortness of breath), Starting Tue 2/27/2018, Print      Insulin Pen Needle (PEN NEEDLES) 31G X 8 MM MISC Inject 1 Stick under the skin 4 (four) times a day (with meals and at bedtime), Starting Wed 3/28/2018, Normal      lidocaine (XYLOCAINE) 5 % ointment Apply topically 2 (two) times a day as needed for mild pain, Starting Wed 12/11/2019, Normal      Mouthwashes (BIOTENE/CALCIUM PBF) LIQD Apply 15 mL to the mouth or throat 2 (two) times a day Swish for 30 seconds then spit out , Starting Fri 2/28/2020, Normal      NOVOLOG FLEXPEN 100 units/mL SOPN INJECT 6 UNITS UNDER THE SKIN THREE (THREE) TIMES A DAY WITH MEALS, Normal      NYSTATIN powder APPLY TOPICALLY TWICE A DAY TO TO AFFECTED AREAS, Normal             No discharge procedures on file           [address] [facility], DO  07/23/20 [id number]

## 2020-07-30 DIAGNOSIS — Z79.4 TYPE 2 DIABETES MELLITUS WITHOUT COMPLICATION, WITH LONG-TERM CURRENT USE OF INSULIN (HCC): ICD-10-CM

## 2020-07-30 DIAGNOSIS — E11.9 TYPE 2 DIABETES MELLITUS WITHOUT COMPLICATION, WITH LONG-TERM CURRENT USE OF INSULIN (HCC): ICD-10-CM

## 2020-07-31 DIAGNOSIS — R10.13 EPIGASTRIC PAIN: ICD-10-CM

## 2020-07-31 RX ORDER — INSULIN ASPART 100 [IU]/ML
6 INJECTION, SOLUTION INTRAVENOUS; SUBCUTANEOUS
Qty: 15 ML | Refills: 2 | Status: SHIPPED | OUTPATIENT
Start: 2020-07-31 | End: 2020-09-24 | Stop reason: SDUPTHER

## 2020-07-31 RX ORDER — FAMOTIDINE 20 MG/1
TABLET, FILM COATED ORAL
Qty: 180 TABLET | Refills: 0 | OUTPATIENT
Start: 2020-07-31

## 2020-08-10 DIAGNOSIS — M31.30 WEGENER'S GRANULOMATOSIS: Chronic | ICD-10-CM

## 2020-08-10 DIAGNOSIS — F31.9 BIPOLAR 1 DISORDER (HCC): ICD-10-CM

## 2020-08-10 DIAGNOSIS — G89.4 CHRONIC PAIN SYNDROME: ICD-10-CM

## 2020-08-11 RX ORDER — DEXTROAMPHETAMINE SACCHARATE, AMPHETAMINE ASPARTATE MONOHYDRATE, DEXTROAMPHETAMINE SULFATE AND AMPHETAMINE SULFATE 5; 5; 5; 5 MG/1; MG/1; MG/1; MG/1
20 CAPSULE, EXTENDED RELEASE ORAL 2 TIMES DAILY
Qty: 60 CAPSULE | Refills: 0 | Status: SHIPPED | OUTPATIENT
Start: 2020-08-11 | End: 2020-09-21 | Stop reason: SDUPTHER

## 2020-08-11 RX ORDER — OXYCODONE AND ACETAMINOPHEN 7.5; 325 MG/1; MG/1
1 TABLET ORAL EVERY 4 HOURS PRN
Qty: 150 TABLET | Refills: 0 | Status: SHIPPED | OUTPATIENT
Start: 2020-08-11 | End: 2020-09-09 | Stop reason: SDUPTHER

## 2020-08-13 DIAGNOSIS — K59.00 CONSTIPATION, UNSPECIFIED CONSTIPATION TYPE: ICD-10-CM

## 2020-08-13 RX ORDER — POLYETHYLENE GLYCOL 3350 17 G/17G
17 POWDER, FOR SOLUTION ORAL DAILY
Qty: 510 G | Refills: 2 | Status: SHIPPED | OUTPATIENT
Start: 2020-08-13 | End: 2020-11-05 | Stop reason: SDUPTHER

## 2020-08-18 ENCOUNTER — TELEPHONE (OUTPATIENT)
Dept: FAMILY MEDICINE CLINIC | Facility: CLINIC | Age: 50
End: 2020-08-18

## 2020-08-18 NOTE — TELEPHONE ENCOUNTER
Rosalina Pastor called from [facility] requesting an RX for a wheel chair delivery so they can deliver the pts wheel chair   Please advise thank you

## 2020-08-19 ENCOUNTER — TELEMEDICINE (OUTPATIENT)
Dept: FAMILY MEDICINE CLINIC | Facility: CLINIC | Age: 50
End: 2020-08-19

## 2020-08-19 VITALS — SYSTOLIC BLOOD PRESSURE: 155 MMHG | HEART RATE: 88 BPM | DIASTOLIC BLOOD PRESSURE: 96 MMHG | TEMPERATURE: 98.6 F

## 2020-08-19 DIAGNOSIS — G47.411 CATAPLEXY: Primary | ICD-10-CM

## 2020-08-19 DIAGNOSIS — R29.898 WEAKNESS OF BOTH LOWER EXTREMITIES: ICD-10-CM

## 2020-08-19 DIAGNOSIS — G89.4 CHRONIC PAIN SYNDROME: ICD-10-CM

## 2020-08-19 PROCEDURE — G2012 BRIEF CHECK IN BY MD/QHP: HCPCS | Performed by: PHYSICIAN ASSISTANT

## 2020-08-19 NOTE — PROGRESS NOTES
Virtual Brief Visit    Assessment/Plan:    Problem List Items Addressed This Visit        Nervous and Auditory    Cataplexy - Primary    Relevant Orders    Durable Medical Equipment    Weakness of both lower extremities    Relevant Orders    Durable Medical Equipment       Other    Chronic pain    Relevant Orders    Durable Medical Equipment        Cataplexy/weakness of both lower extremities/chronic pain syndrome  - Will provide script for new back brace  Reason for visit is   Chief Complaint   Patient presents with    Virtual Brief Visit     pt is requesting a new back brace    Virtual Brief Visit        Encounter provider Daniele Mayen PA-C    Provider located at [address] [place] [address]  [address] [place] [id number] [phone]    Recent Visits  Date Type Provider Dept   08/19/20 Telemedicine Katarzyna Victor PA-C  [facility]   08/18/20 Telephone FLORY Leal [facility]   Showing recent visits within past 7 days and meeting all other requirements     Future Appointments  No visits were found meeting these conditions  Showing future appointments within next 150 days and meeting all other requirements        After connecting through [facility] and patient was informed that this is a secure, HIPAA-compliant platform  She agrees to proceed  , the patient was identified by name and date of birth  Leonardo Jeffries was informed that this is a telemedicine visit and that the visit is being conducted through [facility] and patient was informed that this is a secure, HIPAA-compliant platform  She agrees to proceed     My office door was closed  No one else was in the room  She acknowledged consent and understanding of privacy and security of the platform  The patient has agreed to participate and understands she can discontinue the visit at any time  Patient is aware this is a billable service       Subjective    Leonardo Jeffries is a 52 y o  female with known past medical history of irritable bowel syndrome, gastroparesis, GERD, pancreatitis, type 2 diabetes mellitus, allergic rhinitis, asthma, hypertension, Wegener's granulomatosis, microscopic ployangiitis, weakness of both lower extremities, smell disturbance, small fiber neuropathy, post herpetic neuralgia, cataplexy, trochanteric bursitis of left hip, chronic kidney disease, stage IV, dyslipidemia, chronic pain, bipolar disorder, anemia  who is seen today via telemedicine for referral for new back brace  - Patient notes she received her current back brace in 2015  Patient notes at that time she was weighing over 200 lb  Patient notes currently she is weighing about 169 lb  In addition, patient notes she now has a pacemaker implanted  Patient notes with her current back brace, the brace interferes with the pacemaker and causes her pain  Patient currently receives her back brace through [facility]         Past Medical History:   Diagnosis Date    ADHD     Anemia of chronic disease     Anxiety     Asthma     Asthma     Borderline personality disorder (HCC)     Cataplexy     Chronic abdominal pain     CKD (chronic kidney disease) stage 3, GFR 30-59 ml/min (HCC)     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HCC)     Cushing syndrome ([address] )     Diabetes mellitus ([address] )     DVT (deep venous thrombosis) (HCC)     GERD (gastroesophageal reflux disease)     History of acute pancreatitis     felt secondary to Bactrim    History of transfusion     HTN (hypertension)     Hypertension     Liver disease     fatty liver    Microscopic polyangiitis (HCC)     Morbid obesity (HCC)     MPA (microscopic polyangiitis) (HCC)     Ovarian cyst     PTSD (post-traumatic stress disorder)     Renal disorder     Self-inflicted injury     self inflicted skin wounds    Wegener's granulomatosis with renal involvement ([facility] [address] ) 2015       Past Surgical History:   Procedure Laterality Date    ESOPHAGOGASTRODUODENOSCOPY 09/11/2015    mild antral gastritis    GASTRIC STIMULATOR IMPLANT SURGERY  06/25/2020    [place] COLONOSCOPY FLX DX W/COLLJ SPEC WHEN PFRMD N/A 12/14/2018    Procedure: COLONOSCOPY with polypectomy;  Surgeon: Isha Noland MD;  Location: [facility]; Service: Gastroenterology    [place] ESOPHAGOGASTRODUODENOSCOPY TRANSORAL DIAGNOSTIC N/A 12/14/2018    Procedure: ESOPHAGOGASTRODUODENOSCOPY (EGD) with biopsy;  Surgeon: Isha Noland MD;  Location: [facility];   Service: Gastroenterology    RELEASE SCAR CONTRACTURE / GRAFT REPAIRS OF HAND Bilateral        Current Outpatient Medications   Medication Sig Dispense Refill    albuterol (VENTOLIN HFA) 90 mcg/act inhaler Inhale 2 puffs every 6 (six) hours as needed for wheezing 18 g 1    Alcohol Swabs (ALCOHOL PADS) 70 % PADS by Does not apply route 4 (four) times a day 400 each 3    amLODIPine (NORVASC) 5 mg tablet Take 1 tablet (5 mg total) by mouth daily 90 tablet 3    amphetamine-dextroamphetamine (ADDERALL XR) 20 MG 24 hr capsule Take 1 capsule (20 mg total) by mouth 2 (two) times a dayMax Daily Amount: 40 mg 60 capsule 0    Blood Glucose Monitoring Suppl (FREESTYLE LITE) DEIRDRE by Does not apply route daily 1 each 0    buPROPion (WELLBUTRIN XL) 150 mg 24 hr tablet bupropion HCl  mg 24 hr tablet, extended release      cycloSPORINE (RESTASIS) 0 05 % ophthalmic emulsion Administer 1 drop to both eyes 2 (two) times a day      dicyclomine (BENTYL) 20 mg tablet Take 1 tablet (20 mg total) by mouth 2 (two) times a day for 5 days 10 tablet 0    Disposable Gloves MISC by Does not apply route 4 (four) times a day As needed for hygiene (Patient not taking: Reported on 2/28/2020) 100 each 11    docusate sodium (COLACE) 100 mg capsule Take 1 capsule (100 mg total) by mouth 2 (two) times a day 180 capsule 0    Easy Comfort Lancets MISC USE THREE TIMES A  each 3    FREESTYLE LITE test strip TEST THREE TIMES A DAY AS DIRECTED 100 each 11    Humidifiers (COOL MIST HUMIDIFIER 1 GALLON) MISC by Does not apply route as needed (shortness of breath) (Patient not taking: Reported on 2/28/2020) 1 each 0    Insulin Aspart FlexPen (NovoLOG FlexPen) 100 UNIT/ML SOPN Inject 6 Units under the skin 3 (three) times a day with meals 15 mL 2    insulin glargine (Toujeo Max SoloStar) 300 units/mL CONCETRATED U-300 injection pen (2-unit dial) Inject 22 Units under the skin daily 6 pen 0    Insulin Pen Needle (PEN NEEDLES) 31G X 8 MM MISC Inject 1 Stick under the skin 4 (four) times a day (with meals and at bedtime) 100 each 6    lidocaine (XYLOCAINE) 5 % ointment Apply topically 2 (two) times a day as needed for mild pain 250 g 1    Mouthwashes (BIOTENE/CALCIUM PBF) LIQD Apply 15 mL to the mouth or throat 2 (two) times a day Swish for 30 seconds then spit out  1000 mL 11    NYSTATIN powder APPLY TOPICALLY TWICE A DAY TO TO AFFECTED AREAS 60 g 0    ondansetron (ZOFRAN) 4 mg tablet Take 1 tablet (4 mg total) by mouth every 8 (eight) hours as needed for nausea or vomiting 30 tablet 2    oxyCODONE-acetaminophen (PERCOCET) 7 5-325 MG per tablet Take 1 tablet by mouth every 4 (four) hours as needed for severe painMax Daily Amount: 5 tablets 150 tablet 0    polyethylene glycol (GLYCOLAX) 17 GM/SCOOP powder Take 17 g by mouth daily 510 g 2    Probiotic Product (PROBIOTIC-10 PO) Take 1 tablet by mouth daily      QUEtiapine (SEROquel) 100 mg tablet Take 1 tablet (100 mg total) by mouth daily at bedtime 90 tablet 1    scopolamine (TRANSDERM-SCOP) 1 5 mg/3 days TD 72 hr patch Place 1 patch on the skin every third day 10 patch 3    TiZANidine (ZANAFLEX) 4 MG capsule TAKE ONE CAPSULE BY MOUTH THREE TIMES A DAY 90 capsule 1     No current facility-administered medications for this visit           Allergies   Allergen Reactions    Prozac [Fluoxetine Hcl]      SI    Bactrim [Sulfamethoxazole-Trimethoprim]      Pt "They think that is what cause the pancreatitis"     Lamictal [Lamotrigine] GI Intolerance    Lithium Other (See Comments)    Amlodipine Hives    Haldol [Haloperidol] Other (See Comments)     "I don't like it"    Ibuprofen     Lexapro [Escitalopram Oxalate] Rash    Navane [Thiothixene]      SI    Other      "novaine?" antipsychotic       Review of Systems   Constitutional: Negative for chills, fatigue and fever  Eyes: Negative for visual disturbance  Respiratory: Negative for cough, chest tightness, shortness of breath and wheezing  Cardiovascular: Negative for chest pain and palpitations  Gastrointestinal: Positive for abdominal pain and nausea  Negative for abdominal distention, constipation, diarrhea and vomiting  Endocrine: Negative for polydipsia, polyphagia and polyuria  Musculoskeletal: Positive for gait problem and myalgias  Negative for joint swelling and neck stiffness  Skin: Negative for rash and wound  Neurological: Positive for weakness and numbness  Negative for dizziness, tremors, light-headedness and headaches  Psychiatric/Behavioral: Negative for dysphoric mood, sleep disturbance and suicidal ideas  The patient is not nervous/anxious  Vitals:    08/19/20 1106   BP: 155/96   BP Location: Right arm   Patient Position: Sitting   Cuff Size: Adult   Pulse: 88   Temp: 98 6 °F (37 °C)   TempSrc: Temporal         I spent 10 minutes directly with the patient during this visit     It was my intent to perform this visit via video technology but the patient was not able to do a video connection so the visit was completed via audio telephone only  VIRTUAL VISIT DISCLAIMER    Gogo Weldon acknowledges that she has consented to an online visit or consultation  She understands that the online visit is based solely on information provided by her, and that, in the absence of a face-to-face physical evaluation by the physician, the diagnosis she receives is both limited and provisional in terms of accuracy and completeness   This is not intended to replace a full medical face-to-face evaluation by the physician  Bar Paige understands and accepts these terms

## 2020-08-20 DIAGNOSIS — M31.31 GRANULOMATOSIS WITH POLYANGIITIS WITH RENAL INVOLVEMENT (HCC): Primary | Chronic | ICD-10-CM

## 2020-08-20 DIAGNOSIS — R29.898 WEAKNESS OF BOTH LOWER EXTREMITIES: ICD-10-CM

## 2020-08-24 DIAGNOSIS — B37.9 CANDIDIASIS: ICD-10-CM

## 2020-08-24 RX ORDER — NYSTATIN 100000 [USP'U]/G
POWDER TOPICAL 2 TIMES DAILY
Qty: 60 G | Refills: 0 | Status: SHIPPED | OUTPATIENT
Start: 2020-08-24 | End: 2020-11-10

## 2020-08-26 ENCOUNTER — TELEPHONE (OUTPATIENT)
Dept: FAMILY MEDICINE CLINIC | Facility: CLINIC | Age: 50
End: 2020-08-26

## 2020-08-26 NOTE — TELEPHONE ENCOUNTER
Patient called stating back brace was sent to wrong company she would like it sent to [facility]

## 2020-08-28 ENCOUNTER — TELEPHONE (OUTPATIENT)
Dept: NEPHROLOGY | Facility: CLINIC | Age: 50
End: 2020-08-28

## 2020-08-28 NOTE — TELEPHONE ENCOUNTER
Spoke with pt to schedule 6 mth f/u with Dr Franklin Marin  She is doing good and she will call back to schedule when needed

## 2020-09-04 ENCOUNTER — TELEPHONE (OUTPATIENT)
Dept: FAMILY MEDICINE CLINIC | Facility: CLINIC | Age: 50
End: 2020-09-04

## 2020-09-04 NOTE — TELEPHONE ENCOUNTER
SIGNATURES NEEDED FOR MINI NUTRITIONAL ASSESSMENT  FORM RECEIVED VIA FAX  WILL BE PLACED IN YOUR BIN AT ASSIGNED DELIVERY TIMES

## 2020-09-04 NOTE — TELEPHONE ENCOUNTER
Request for oxycodone refill q4hprn    Last sent 8/11 for 150tabs  Last UDS was in Feb  No contract found within last year  Noted needs in appt notes for this month

## 2020-09-09 DIAGNOSIS — Z79.4 TYPE 2 DIABETES MELLITUS WITHOUT COMPLICATION, WITH LONG-TERM CURRENT USE OF INSULIN (HCC): ICD-10-CM

## 2020-09-09 DIAGNOSIS — M31.30 WEGENER'S GRANULOMATOSIS: Chronic | ICD-10-CM

## 2020-09-09 DIAGNOSIS — G89.4 CHRONIC PAIN SYNDROME: ICD-10-CM

## 2020-09-09 DIAGNOSIS — E11.9 TYPE 2 DIABETES MELLITUS WITHOUT COMPLICATION, WITH LONG-TERM CURRENT USE OF INSULIN (HCC): ICD-10-CM

## 2020-09-10 RX ORDER — BLOOD-GLUCOSE METER
1 KIT MISCELLANEOUS 3 TIMES DAILY
Qty: 100 EACH | Refills: 3 | Status: SHIPPED | OUTPATIENT
Start: 2020-09-10 | End: 2020-12-31

## 2020-09-10 RX ORDER — OXYCODONE AND ACETAMINOPHEN 7.5; 325 MG/1; MG/1
1 TABLET ORAL EVERY 4 HOURS PRN
Qty: 150 TABLET | Refills: 0 | Status: SHIPPED | OUTPATIENT
Start: 2020-09-10 | End: 2020-10-05 | Stop reason: SDUPTHER

## 2020-09-10 NOTE — TELEPHONE ENCOUNTER
Pt is calling just to make sure her meds for oxy get release tomorrow since tomorrow is when she's due

## 2020-09-18 DIAGNOSIS — F31.9 BIPOLAR 1 DISORDER (HCC): ICD-10-CM

## 2020-09-18 DIAGNOSIS — K59.00 CONSTIPATION, UNSPECIFIED CONSTIPATION TYPE: ICD-10-CM

## 2020-09-19 ENCOUNTER — TELEPHONE (OUTPATIENT)
Dept: OTHER | Facility: OTHER | Age: 50
End: 2020-09-19

## 2020-09-19 NOTE — TELEPHONE ENCOUNTER
Patient called regarding her refills Adderall and Docusate Sodium   Advise it could take up to 72 to refill and pt was upset and said she would make a complaint

## 2020-09-21 DIAGNOSIS — K59.00 CONSTIPATION, UNSPECIFIED CONSTIPATION TYPE: ICD-10-CM

## 2020-09-21 DIAGNOSIS — F31.9 BIPOLAR 1 DISORDER (HCC): ICD-10-CM

## 2020-09-21 RX ORDER — DOCUSATE SODIUM 100 MG/1
100 CAPSULE, LIQUID FILLED ORAL 2 TIMES DAILY
Qty: 180 CAPSULE | Refills: 0 | Status: SHIPPED | OUTPATIENT
Start: 2020-09-21 | End: 2020-11-19 | Stop reason: SDUPTHER

## 2020-09-21 RX ORDER — DOCUSATE SODIUM 100 MG/1
100 CAPSULE, LIQUID FILLED ORAL 2 TIMES DAILY
Qty: 180 CAPSULE | Refills: 0 | OUTPATIENT
Start: 2020-09-21

## 2020-09-21 RX ORDER — DEXTROAMPHETAMINE SACCHARATE, AMPHETAMINE ASPARTATE MONOHYDRATE, DEXTROAMPHETAMINE SULFATE AND AMPHETAMINE SULFATE 5; 5; 5; 5 MG/1; MG/1; MG/1; MG/1
20 CAPSULE, EXTENDED RELEASE ORAL 2 TIMES DAILY
Qty: 60 CAPSULE | Refills: 0 | Status: SHIPPED | OUTPATIENT
Start: 2020-09-21 | End: 2020-10-22 | Stop reason: SDUPTHER

## 2020-09-21 RX ORDER — DEXTROAMPHETAMINE SACCHARATE, AMPHETAMINE ASPARTATE MONOHYDRATE, DEXTROAMPHETAMINE SULFATE AND AMPHETAMINE SULFATE 5; 5; 5; 5 MG/1; MG/1; MG/1; MG/1
20 CAPSULE, EXTENDED RELEASE ORAL 2 TIMES DAILY
Qty: 60 CAPSULE | Refills: 0 | OUTPATIENT
Start: 2020-09-21

## 2020-09-21 NOTE — TELEPHONE ENCOUNTER
Patient called requesting medication for her adderall states she got a withdrawal this weekend due to not having medication

## 2020-09-23 DIAGNOSIS — J30.1 SEASONAL ALLERGIC RHINITIS DUE TO POLLEN: ICD-10-CM

## 2020-09-24 ENCOUNTER — OFFICE VISIT (OUTPATIENT)
Dept: FAMILY MEDICINE CLINIC | Facility: CLINIC | Age: 50
End: 2020-09-24

## 2020-09-24 VITALS
DIASTOLIC BLOOD PRESSURE: 78 MMHG | HEART RATE: 86 BPM | BODY MASS INDEX: 30.29 KG/M2 | OXYGEN SATURATION: 98 % | HEIGHT: 62 IN | WEIGHT: 164.6 LBS | RESPIRATION RATE: 18 BRPM | SYSTOLIC BLOOD PRESSURE: 122 MMHG | TEMPERATURE: 96.8 F

## 2020-09-24 DIAGNOSIS — N18.4 CKD (CHRONIC KIDNEY DISEASE) STAGE 4, GFR 15-29 ML/MIN (HCC): ICD-10-CM

## 2020-09-24 DIAGNOSIS — G89.4 CHRONIC PAIN SYNDROME: ICD-10-CM

## 2020-09-24 DIAGNOSIS — E66.09 CLASS 1 OBESITY DUE TO EXCESS CALORIES WITHOUT SERIOUS COMORBIDITY WITH BODY MASS INDEX (BMI) OF 30.0 TO 30.9 IN ADULT: ICD-10-CM

## 2020-09-24 DIAGNOSIS — E11.9 TYPE 2 DIABETES MELLITUS WITHOUT COMPLICATION, WITH LONG-TERM CURRENT USE OF INSULIN (HCC): ICD-10-CM

## 2020-09-24 DIAGNOSIS — M54.42 CHRONIC LOW BACK PAIN WITH BILATERAL SCIATICA, UNSPECIFIED BACK PAIN LATERALITY: ICD-10-CM

## 2020-09-24 DIAGNOSIS — F11.90 CHRONIC NARCOTIC USE: ICD-10-CM

## 2020-09-24 DIAGNOSIS — N18.4 BENIGN HYPERTENSION WITH CKD (CHRONIC KIDNEY DISEASE) STAGE IV (HCC): Chronic | ICD-10-CM

## 2020-09-24 DIAGNOSIS — Z79.4 TYPE 2 DIABETES MELLITUS WITHOUT COMPLICATION, WITH LONG-TERM CURRENT USE OF INSULIN (HCC): ICD-10-CM

## 2020-09-24 DIAGNOSIS — J45.20 MILD INTERMITTENT ASTHMA WITHOUT COMPLICATION: Chronic | ICD-10-CM

## 2020-09-24 DIAGNOSIS — R29.898 WEAKNESS OF BOTH LOWER EXTREMITIES: ICD-10-CM

## 2020-09-24 DIAGNOSIS — M31.31 GRANULOMATOSIS WITH POLYANGIITIS WITH RENAL INVOLVEMENT (HCC): Chronic | ICD-10-CM

## 2020-09-24 DIAGNOSIS — I12.9 BENIGN HYPERTENSION WITH CKD (CHRONIC KIDNEY DISEASE) STAGE IV (HCC): Chronic | ICD-10-CM

## 2020-09-24 DIAGNOSIS — R29.898 WEAKNESS OF BOTH UPPER EXTREMITIES: ICD-10-CM

## 2020-09-24 DIAGNOSIS — F90.9 ATTENTION DEFICIT HYPERACTIVITY DISORDER (ADHD), UNSPECIFIED ADHD TYPE: ICD-10-CM

## 2020-09-24 DIAGNOSIS — E11.22 TYPE 2 DIABETES MELLITUS WITH STAGE 4 CHRONIC KIDNEY DISEASE, UNSPECIFIED WHETHER LONG TERM INSULIN USE (HCC): Primary | ICD-10-CM

## 2020-09-24 DIAGNOSIS — G47.411 CATAPLEXY: ICD-10-CM

## 2020-09-24 DIAGNOSIS — G89.29 CHRONIC LOW BACK PAIN WITH BILATERAL SCIATICA, UNSPECIFIED BACK PAIN LATERALITY: ICD-10-CM

## 2020-09-24 DIAGNOSIS — G62.9 SMALL FIBER NEUROPATHY: ICD-10-CM

## 2020-09-24 DIAGNOSIS — F31.9 BIPOLAR 1 DISORDER (HCC): ICD-10-CM

## 2020-09-24 DIAGNOSIS — M54.41 CHRONIC LOW BACK PAIN WITH BILATERAL SCIATICA, UNSPECIFIED BACK PAIN LATERALITY: ICD-10-CM

## 2020-09-24 DIAGNOSIS — N18.4 TYPE 2 DIABETES MELLITUS WITH STAGE 4 CHRONIC KIDNEY DISEASE, UNSPECIFIED WHETHER LONG TERM INSULIN USE (HCC): Primary | ICD-10-CM

## 2020-09-24 DIAGNOSIS — K31.84 GASTROPARESIS: ICD-10-CM

## 2020-09-24 LAB — SL AMB POCT HEMOGLOBIN AIC: 5.8 (ref ?–6.5)

## 2020-09-24 PROCEDURE — 3044F HG A1C LEVEL LT 7.0%: CPT | Performed by: PHYSICIAN ASSISTANT

## 2020-09-24 PROCEDURE — 3008F BODY MASS INDEX DOCD: CPT | Performed by: PHYSICIAN ASSISTANT

## 2020-09-24 PROCEDURE — 3066F NEPHROPATHY DOC TX: CPT | Performed by: PHYSICIAN ASSISTANT

## 2020-09-24 PROCEDURE — 83036 HEMOGLOBIN GLYCOSYLATED A1C: CPT | Performed by: PHYSICIAN ASSISTANT

## 2020-09-24 PROCEDURE — 80307 DRUG TEST PRSMV CHEM ANLYZR: CPT | Performed by: PHYSICIAN ASSISTANT

## 2020-09-24 PROCEDURE — 99215 OFFICE O/P EST HI 40 MIN: CPT | Performed by: PHYSICIAN ASSISTANT

## 2020-09-24 PROCEDURE — 1036F TOBACCO NON-USER: CPT | Performed by: PHYSICIAN ASSISTANT

## 2020-09-24 RX ORDER — INSULIN ASPART 100 [IU]/ML
6 INJECTION, SOLUTION INTRAVENOUS; SUBCUTANEOUS
Qty: 15 ML | Refills: 2 | Status: SHIPPED | OUTPATIENT
Start: 2020-09-24 | End: 2021-10-30 | Stop reason: SDUPTHER

## 2020-09-24 RX ORDER — ALBUTEROL SULFATE 2.5 MG/3ML
2.5 SOLUTION RESPIRATORY (INHALATION) EVERY 6 HOURS PRN
Qty: 60 VIAL | Refills: 2 | Status: SHIPPED | OUTPATIENT
Start: 2020-09-24 | End: 2020-11-02

## 2020-09-24 RX ORDER — TIZANIDINE HYDROCHLORIDE 4 MG/1
4 CAPSULE, GELATIN COATED ORAL 3 TIMES DAILY
Qty: 90 CAPSULE | Refills: 1 | Status: SHIPPED | OUTPATIENT
Start: 2020-09-24 | End: 2020-11-20

## 2020-09-24 RX ORDER — ALBUTEROL SULFATE 90 UG/1
2 AEROSOL, METERED RESPIRATORY (INHALATION) EVERY 6 HOURS PRN
Qty: 18 G | Refills: 2 | Status: SHIPPED | OUTPATIENT
Start: 2020-09-24 | End: 2020-12-02

## 2020-09-24 RX ORDER — MONTELUKAST SODIUM 10 MG/1
TABLET ORAL
Qty: 90 TABLET | Refills: 2 | Status: SHIPPED | OUTPATIENT
Start: 2020-09-24 | End: 2021-04-27 | Stop reason: HOSPADM

## 2020-09-24 RX ORDER — QUETIAPINE FUMARATE 100 MG/1
100 TABLET, FILM COATED ORAL
Qty: 90 TABLET | Refills: 1 | Status: SHIPPED | OUTPATIENT
Start: 2020-09-24 | End: 2020-09-28 | Stop reason: SDUPTHER

## 2020-09-24 RX ORDER — ONDANSETRON 4 MG/1
4 TABLET, FILM COATED ORAL EVERY 8 HOURS PRN
Qty: 30 TABLET | Refills: 2 | Status: SHIPPED | OUTPATIENT
Start: 2020-09-24 | End: 2021-09-09 | Stop reason: SDUPTHER

## 2020-09-24 RX ORDER — INSULIN GLARGINE 300 U/ML
22 INJECTION, SOLUTION SUBCUTANEOUS DAILY
Qty: 6 PEN | Refills: 1 | Status: SHIPPED | OUTPATIENT
Start: 2020-09-24 | End: 2021-02-22

## 2020-09-24 NOTE — PROGRESS NOTES
Assessment/Plan:    Gastroparesis  - Status post laparoscopic pyloroplasty with gastric stimulator placement on 06/22/2020 with [facility]   - Patient is doing well post surgery  Nausea has significantly decreased and vomiting has resolved  - Advised to follow-up with [facility] as scheduled  Chronic kidney disease, stage IV  - Overall renal function remains quite stable  - Advised to continue follow-up with nephrology as scheduled  Asthma  - Stable  Continue albuterol inhaler as needed and nebulizer as needed  Type 2 diabetes mellitus  - POCT hemoglobin A1c today in office is 5 8  This has decreased from 5 9 in June 2020   - Continue NovoLog 6 units, 3 times daily with meals and Toujeo 22 units once daily  Hypertension  - Continue amlodipine 5 mg once daily  - Currently blood pressure is controlled at this time  Reviewed BP target goal with patient  - Continue to maintain healthy balanced diet with focus on low salt intake  Limit alcohol intake  - Advised to exercise at least 30 minutes a day for at least 5 days out of the week  Chronic narcotic use  - Updated pain contract completed today  Will be scanned into patient's chart  - UDS completed today  Weakness in lower extremities/cataplexy/Wegener's granulomatosis/small fiber neuropathy  - Stable  Continue using power chair when needed  - Continue Percocet 7 5-325 mg, every 4 hours as needed for severe pain  - Updated pain contract completed today and will be scanned into patient's chart  UDS completed today  Bipolar 1 disorder/ADHD  - Stable  Continue Adderall XR 20 mg twice daily and Seroquel 100 mg once daily at bedtime        Diagnoses and all orders for this visit:    Type 2 diabetes mellitus with stage 4 chronic kidney disease, unspecified whether long term insulin use (HCC)  -     POCT hemoglobin A1c    Chronic narcotic use  -     Toxicology screen, urine    Mild intermittent asthma without complication  -     albuterol (Ventolin HFA) 90 mcg/act inhaler; Inhale 2 puffs every 6 (six) hours as needed for wheezing or shortness of breath  -     albuterol (2 5 mg/3 mL) 0 083 % nebulizer solution; Take 1 vial (2 5 mg total) by nebulization every 6 (six) hours as needed for wheezing or shortness of breath    Gastroparesis  -     ondansetron (ZOFRAN) 4 mg tablet; Take 1 tablet (4 mg total) by mouth every 8 (eight) hours as needed for nausea or vomiting    Benign hypertension with CKD (chronic kidney disease) stage IV ([facility])    CKD (chronic kidney disease) stage 4, GFR 15-29 ml/min ([facility])    Bipolar 1 disorder ([facility])  -     QUEtiapine (SEROquel) 100 mg tablet; Take 1 tablet (100 mg total) by mouth daily at bedtime    Chronic pain syndrome    Weakness of both upper extremities    Cataplexy    Small fiber neuropathy    Weakness of both lower extremities    Granulomatosis with polyangiitis with renal involvement ([facility])    Class 1 obesity due to excess calories without serious comorbidity with body mass index (BMI) of 30 0 to 30 9 in adult    Attention deficit hyperactivity disorder (ADHD), unspecified ADHD type    Type 2 diabetes mellitus without complication, with long-term current use of insulin ([facility])  -     insulin aspart (NovoLOG FlexPen) 100 UNIT/ML injection pen; Inject 6 Units under the skin 3 (three) times a day with meals  -     insulin glargine (Toujeo Max SoloStar) 300 units/mL CONCETRATED U-300 injection pen (2-unit dial); Inject 22 Units under the skin daily    Chronic low back pain with bilateral sciatica, unspecified back pain laterality  -     TiZANidine (ZANAFLEX) 4 MG capsule; Take 1 capsule (4 mg total) by mouth 3 (three) times a day    Other orders  -     Cancel: influenza vaccine, quadrivalent, recombinant, PF, 0 5 mL, for patients 18 yr+ (FLUBLOK)          All of patients questions were answered  Patient understands and agrees with the above plan       Return in about 3 months (around 12/24/2020) for Next scheduled follow up diabetes, chronic pain, bipolar disorder  Lo Ash PA-C  09/24/20  [facility]           Subjective:     Patient ID: Darryl Calabrese  is a 52 y o  female with known PMH of  irritable bowel syndrome, gastroparesis, GERD, pancreatitis, type 2 diabetes mellitus, allergic rhinitis, asthma, hypertension, Wegener's granulomatosis, microscopic ployangiitis, weakness of both lower extremities, smell disturbance, small fiber neuropathy, post herpetic neuralgia, cataplexy, trochanteric bursitis of left hip, chronic kidney disease, stage IV, dyslipidemia, chronic pain, bipolar disorder, anemia who presents today in office follow-up for chronic conditions  - Patient is a 52 y o  female who presents today for follow-up of chronic conditions  Overall, patient notes she has been doing well and denies any new complaints today  Gastroparesis:  Patient is status post laparoscopic pyloroplasty with gastric stimulator placement on 06/22/2020 with [facility]  Patient notes she has been doing well since her surgery  Patient notes she does not eat as much as previously  Patient notes she continues to have some nausea, but notes she is no longer vomiting which she is very happy about  Patient notes she was told to increase her protein intake  Patient notes she has been trying to receive Ensure through her insurance, but she is waiting for her surgeon to place the prescription  Chronic kidney disease, stage IV:  Follows with nephrology  Baseline creatinine in the low 2s, high 1s  Underlying disease is secondary to previous MPA vasculitis status post Cytoxan/steroids and plasmapheresis  Type 2 diabetes mellitus:  Currently taking NovoLog 6 units, 3 times daily with meals and Toujeo 22 units once daily  Hypertension:  Currently taking amlodipine 5 mg once daily      Weakness in lower extremities/cataplexy/Wegener's granulomatosis/small fiber neuropathy:   Patient notes she has now received her power chair in it has been working very well  Currently taking Percocet 7 5-325 mg, every 4 hours as needed for severe pain  Bipolar 1 disorder/ADHD:  Currently taking Adderall XR 20 mg twice daily and Seroquel 100 mg once daily at bedtime  The following portions of the patient's history were reviewed and updated as appropriate: allergies, current medications, past family history, past medical history, past social history, past surgical history and problem list         Review of Systems   Constitutional: Negative for chills, fatigue and fever  HENT: Negative for congestion  Eyes: Negative for visual disturbance  Respiratory: Negative for cough, chest tightness, shortness of breath and wheezing  Cardiovascular: Negative for chest pain and palpitations  Gastrointestinal: Positive for abdominal pain and nausea  Negative for abdominal distention, constipation, diarrhea and vomiting  Endocrine: Negative for polydipsia, polyphagia and polyuria  Musculoskeletal: Positive for gait problem and myalgias  Negative for joint swelling and neck stiffness  Skin: Negative for rash and wound  Neurological: Positive for weakness and numbness  Negative for dizziness, tremors, light-headedness and headaches  Psychiatric/Behavioral: Negative for dysphoric mood, sleep disturbance and suicidal ideas  The patient is not nervous/anxious  BMI Counseling: Body mass index is 30 11 kg/m²  The BMI is above normal  Nutrition recommendations include decreasing portion sizes, encouraging healthy choices of fruits and vegetables, consuming healthier snacks, moderation in carbohydrate intake and increasing intake of lean protein  Exercise recommendations include vigorous physical activity 75 minutes/week and strength training exercises  No pharmacotherapy was ordered               Objective:   Vitals:    09/24/20 1122   BP: 122/78   BP Location: Left arm   Patient Position: Sitting   Cuff Size: Standard   Pulse: 86   Resp: 18   Temp: (!) 96 8 °F (36 °C)   TempSrc: Temporal   SpO2: 98%   Weight: 74 7 kg (164 lb 9 6 oz)   Height: 5' 2" (1 575 m)         Physical Exam  Vitals signs and nursing note reviewed  Constitutional:       Appearance: She is well-developed  HENT:      Head: Normocephalic and atraumatic  Right Ear: External ear normal       Left Ear: External ear normal       Nose: Nose normal       Mouth/Throat:      Pharynx: Uvula midline  Eyes:      Conjunctiva/sclera: Conjunctivae normal    Neck:      Musculoskeletal: Normal range of motion and neck supple  Cardiovascular:      Rate and Rhythm: Normal rate and regular rhythm  Heart sounds: Normal heart sounds  No murmur  Pulmonary:      Effort: Pulmonary effort is normal       Breath sounds: Normal breath sounds  No wheezing  Abdominal:      General: Bowel sounds are normal       Palpations: Abdomen is soft  Tenderness: There is no abdominal tenderness  Comments: Well-healed scar noted from previous surgery  Skin:     General: Skin is warm and dry  Neurological:      Mental Status: She is alert and oriented to person, place, and time  Motor: Abnormal muscle tone present

## 2020-09-28 ENCOUNTER — TELEPHONE (OUTPATIENT)
Dept: FAMILY MEDICINE CLINIC | Facility: CLINIC | Age: 50
End: 2020-09-28

## 2020-09-28 ENCOUNTER — TELEPHONE (OUTPATIENT)
Dept: NEPHROLOGY | Facility: CLINIC | Age: 50
End: 2020-09-28

## 2020-09-28 DIAGNOSIS — N18.4 CKD (CHRONIC KIDNEY DISEASE) STAGE 4, GFR 15-29 ML/MIN (HCC): Primary | ICD-10-CM

## 2020-09-28 DIAGNOSIS — D63.8 ANEMIA OF CHRONIC DISEASE: ICD-10-CM

## 2020-09-28 DIAGNOSIS — R80.1 PERSISTENT PROTEINURIA: ICD-10-CM

## 2020-09-28 DIAGNOSIS — M31.31 GRANULOMATOSIS WITH POLYANGIITIS WITH RENAL INVOLVEMENT (HCC): ICD-10-CM

## 2020-09-28 RX ORDER — QUETIAPINE FUMARATE 200 MG/1
150 TABLET, FILM COATED ORAL
COMMUNITY
End: 2022-01-26 | Stop reason: ALTCHOICE

## 2020-09-28 NOTE — TELEPHONE ENCOUNTER
Pt wanted to inform PCP that seroquel 100mg is now 200mg and is given by her psychiatrist  She would like for the medications to be D/C

## 2020-09-28 NOTE — TELEPHONE ENCOUNTER
Patient sees Kiana Gallardo  She last saw him in April of 2020 and she called the [place] office because she doesn't feel well and is very fatigue  She asked if labs can be ordered for her and to please call her when they are placed in the system  She can be reached at [phone]

## 2020-09-28 NOTE — TELEPHONE ENCOUNTER
Noted  Seroquel 100 mg has been d/c  I added seroquel 200 mg to patient's medication list and marked that her psychiatrist is prescribing it  Thanks!

## 2020-09-29 ENCOUNTER — TELEPHONE (OUTPATIENT)
Dept: FAMILY MEDICINE CLINIC | Facility: CLINIC | Age: 50
End: 2020-09-29

## 2020-09-29 LAB
AMPHETAMINES UR QL SCN: NEGATIVE NG/ML
BARBITURATES UR QL SCN: NEGATIVE NG/ML
BENZODIAZ UR QL: NEGATIVE NG/ML
BZE UR QL: NEGATIVE NG/ML
CANNABINOIDS UR QL SCN: POSITIVE
METHADONE UR QL SCN: NEGATIVE NG/ML
OPIATES UR QL: NEGATIVE NG/ML
PCP UR QL: NEGATIVE NG/ML
PROPOXYPH UR QL SCN: NEGATIVE NG/ML

## 2020-09-29 NOTE — TELEPHONE ENCOUNTER
Matthias  for pt is calling try to get a Ensure supplement be approved for pt as per Lula Villa states the original request came from pts surgeon from [facility] they have been trying to reach his office but has not been successful   She is requesting if we are able to do an order for pt and sent to [facility]     If you have any questions please contact her at [phone] or pt can also be contacted regarding request

## 2020-10-01 LAB
ALBUMIN SERPL-MCNC: 4 G/DL (ref 3.6–5.1)
ALBUMIN/GLOB SERPL: 1.3 (CALC) (ref 1–2.5)
ALP SERPL-CCNC: 127 U/L (ref 31–125)
ALT SERPL-CCNC: 10 U/L (ref 6–29)
APPEARANCE UR: CLEAR
AST SERPL-CCNC: 13 U/L (ref 10–35)
BACTERIA UR QL AUTO: ABNORMAL /HPF
BILIRUB SERPL-MCNC: 0.2 MG/DL (ref 0.2–1.2)
BILIRUB UR QL STRIP: NEGATIVE
BUN SERPL-MCNC: 42 MG/DL (ref 7–25)
BUN/CREAT SERPL: 20 (CALC) (ref 6–22)
CALCIUM SERPL-MCNC: 8.7 MG/DL (ref 8.6–10.2)
CHLORIDE SERPL-SCNC: 111 MMOL/L (ref 98–110)
CO2 SERPL-SCNC: 24 MMOL/L (ref 20–32)
COLOR UR: YELLOW
CREAT SERPL-MCNC: 2.08 MG/DL (ref 0.5–1.1)
CREAT UR-MCNC: 150 MG/DL (ref 20–275)
ERYTHROCYTE [DISTWIDTH] IN BLOOD BY AUTOMATED COUNT: 13.3 % (ref 11–15)
GLOBULIN SER CALC-MCNC: 3 G/DL (CALC) (ref 1.9–3.7)
GLUCOSE SERPL-MCNC: 110 MG/DL (ref 65–99)
GLUCOSE UR QL STRIP: NEGATIVE
HCT VFR BLD AUTO: 38.9 % (ref 35–45)
HGB BLD-MCNC: 12.7 G/DL (ref 11.7–15.5)
HGB UR QL STRIP: NEGATIVE
HYALINE CASTS #/AREA URNS LPF: ABNORMAL /LPF
KETONES UR QL STRIP: NEGATIVE
LEUKOCYTE ESTERASE UR QL STRIP: NEGATIVE
MCH RBC QN AUTO: 29.9 PG (ref 27–33)
MCHC RBC AUTO-ENTMCNC: 32.6 G/DL (ref 32–36)
MCV RBC AUTO: 91.5 FL (ref 80–100)
NITRITE UR QL STRIP: NEGATIVE
PH UR STRIP: 5.5 [PH] (ref 5–8)
PHOSPHATE SERPL-MCNC: 4.2 MG/DL (ref 2.5–4.5)
PLATELET # BLD AUTO: 294 THOUSAND/UL (ref 140–400)
PMV BLD REES-ECKER: 10.4 FL (ref 7.5–12.5)
POTASSIUM SERPL-SCNC: 4.8 MMOL/L (ref 3.5–5.3)
PROT SERPL-MCNC: 7 G/DL (ref 6.1–8.1)
PROT UR QL STRIP: ABNORMAL
PROT UR-MCNC: 44 MG/DL (ref 5–24)
PROT/CREAT UR: 0.29 MG/MG CREAT (ref 0.02–0.16)
PROT/CREAT UR: 293 MG/G CREAT (ref 21–161)
RBC # BLD AUTO: 4.25 MILLION/UL (ref 3.8–5.1)
RBC #/AREA URNS HPF: ABNORMAL /HPF
SL AMB EGFR AFRICAN AMERICAN: 32 ML/MIN/1.73M2
SL AMB EGFR NON AFRICAN AMERICAN: 27 ML/MIN/1.73M2
SODIUM SERPL-SCNC: 140 MMOL/L (ref 135–146)
SP GR UR STRIP: 1.02 (ref 1–1.03)
SQUAMOUS #/AREA URNS HPF: ABNORMAL /HPF
WBC # BLD AUTO: 6.6 THOUSAND/UL (ref 3.8–10.8)
WBC #/AREA URNS HPF: ABNORMAL /HPF

## 2020-10-01 PROCEDURE — 3061F NEG MICROALBUMINURIA REV: CPT | Performed by: PHYSICIAN ASSISTANT

## 2020-10-05 DIAGNOSIS — M31.30 WEGENER'S GRANULOMATOSIS: Chronic | ICD-10-CM

## 2020-10-05 DIAGNOSIS — G89.4 CHRONIC PAIN SYNDROME: ICD-10-CM

## 2020-10-05 RX ORDER — OXYCODONE AND ACETAMINOPHEN 7.5; 325 MG/1; MG/1
1 TABLET ORAL EVERY 4 HOURS PRN
Qty: 150 TABLET | Refills: 0 | Status: SHIPPED | OUTPATIENT
Start: 2020-10-05 | End: 2020-11-06 | Stop reason: SDUPTHER

## 2020-10-06 ENCOUNTER — TELEPHONE (OUTPATIENT)
Dept: FAMILY MEDICINE CLINIC | Facility: CLINIC | Age: 50
End: 2020-10-06

## 2020-10-06 DIAGNOSIS — K31.84 GASTROPARESIS: Primary | ICD-10-CM

## 2020-10-13 RX ORDER — CALORIC SUPPLEMENT
1 LIQUID (ML) ORAL 2 TIMES DAILY
Qty: 60 BOTTLE | Refills: 11 | Status: SHIPPED | OUTPATIENT
Start: 2020-10-13 | End: 2022-01-18 | Stop reason: ALTCHOICE

## 2020-10-16 ENCOUNTER — TELEPHONE (OUTPATIENT)
Dept: FAMILY MEDICINE CLINIC | Facility: CLINIC | Age: 50
End: 2020-10-16

## 2020-10-22 DIAGNOSIS — F31.9 BIPOLAR 1 DISORDER (HCC): ICD-10-CM

## 2020-10-22 RX ORDER — DEXTROAMPHETAMINE SACCHARATE, AMPHETAMINE ASPARTATE MONOHYDRATE, DEXTROAMPHETAMINE SULFATE AND AMPHETAMINE SULFATE 5; 5; 5; 5 MG/1; MG/1; MG/1; MG/1
20 CAPSULE, EXTENDED RELEASE ORAL 2 TIMES DAILY
Qty: 60 CAPSULE | Refills: 0 | Status: SHIPPED | OUTPATIENT
Start: 2020-10-22 | End: 2020-11-18 | Stop reason: SDUPTHER

## 2020-10-26 ENCOUNTER — TELEPHONE (OUTPATIENT)
Dept: FAMILY MEDICINE CLINIC | Facility: CLINIC | Age: 50
End: 2020-10-26

## 2020-10-26 ENCOUNTER — TELEMEDICINE (OUTPATIENT)
Dept: FAMILY MEDICINE CLINIC | Facility: CLINIC | Age: 50
End: 2020-10-26

## 2020-10-26 DIAGNOSIS — B37.3 VAGINAL CANDIDIASIS: Primary | ICD-10-CM

## 2020-10-26 PROCEDURE — G2012 BRIEF CHECK IN BY MD/QHP: HCPCS | Performed by: PHYSICIAN ASSISTANT

## 2020-10-26 RX ORDER — FLUCONAZOLE 150 MG/1
150 TABLET ORAL ONCE
Qty: 1 TABLET | Refills: 0 | Status: SHIPPED | OUTPATIENT
Start: 2020-10-26 | End: 2020-10-26

## 2020-10-30 ENCOUNTER — TELEMEDICINE (OUTPATIENT)
Dept: FAMILY MEDICINE CLINIC | Facility: CLINIC | Age: 50
End: 2020-10-30

## 2020-10-30 DIAGNOSIS — J34.89 NASAL SORE: Primary | ICD-10-CM

## 2020-10-30 DIAGNOSIS — J45.20 MILD INTERMITTENT ASTHMA WITHOUT COMPLICATION: Chronic | ICD-10-CM

## 2020-10-30 PROCEDURE — 99212 OFFICE O/P EST SF 10 MIN: CPT | Performed by: PHYSICIAN ASSISTANT

## 2020-11-02 RX ORDER — ALBUTEROL SULFATE 2.5 MG/3ML
SOLUTION RESPIRATORY (INHALATION)
Qty: 150 ML | Refills: 1 | Status: SHIPPED | OUTPATIENT
Start: 2020-11-02 | End: 2020-12-02

## 2020-11-05 DIAGNOSIS — K59.00 CONSTIPATION, UNSPECIFIED CONSTIPATION TYPE: ICD-10-CM

## 2020-11-05 RX ORDER — POLYETHYLENE GLYCOL 3350 17 G/17G
17 POWDER, FOR SOLUTION ORAL DAILY
Qty: 510 G | Refills: 2 | Status: SHIPPED | OUTPATIENT
Start: 2020-11-05 | End: 2020-11-10

## 2020-11-06 DIAGNOSIS — G89.4 CHRONIC PAIN SYNDROME: ICD-10-CM

## 2020-11-06 DIAGNOSIS — M31.30 WEGENER'S GRANULOMATOSIS: Chronic | ICD-10-CM

## 2020-11-06 RX ORDER — OXYCODONE AND ACETAMINOPHEN 7.5; 325 MG/1; MG/1
1 TABLET ORAL EVERY 4 HOURS PRN
Qty: 150 TABLET | Refills: 0 | Status: SHIPPED | OUTPATIENT
Start: 2020-11-06 | End: 2020-12-07 | Stop reason: SDUPTHER

## 2020-11-10 ENCOUNTER — APPOINTMENT (EMERGENCY)
Dept: CT IMAGING | Facility: HOSPITAL | Age: 50
End: 2020-11-10
Payer: COMMERCIAL

## 2020-11-10 ENCOUNTER — HOSPITAL ENCOUNTER (OUTPATIENT)
Facility: HOSPITAL | Age: 50
Setting detail: OBSERVATION
Discharge: HOME | End: 2020-11-11
Attending: EMERGENCY MEDICINE | Admitting: INTERNAL MEDICINE
Payer: COMMERCIAL

## 2020-11-10 DIAGNOSIS — N95.2 VAGINAL ATROPHY: ICD-10-CM

## 2020-11-10 DIAGNOSIS — K85.90 ACUTE PANCREATITIS: Primary | ICD-10-CM

## 2020-11-10 PROBLEM — R74.8 ELEVATED LIPASE: Status: ACTIVE | Noted: 2020-11-10

## 2020-11-10 PROBLEM — R03.0 ELEVATED BLOOD PRESSURE READING: Status: ACTIVE | Noted: 2020-11-10

## 2020-11-10 LAB
ALBUMIN SERPL BCP-MCNC: 3.6 G/DL (ref 3.5–5)
ALP SERPL-CCNC: 134 U/L (ref 46–116)
ALT SERPL W P-5'-P-CCNC: 21 U/L (ref 12–78)
ANION GAP SERPL CALCULATED.3IONS-SCNC: 11 MMOL/L (ref 4–13)
AST SERPL W P-5'-P-CCNC: 14 U/L (ref 5–45)
BACTERIA UR QL AUTO: ABNORMAL /HPF
BASOPHILS # BLD AUTO: 0.02 THOUSANDS/ΜL (ref 0–0.1)
BASOPHILS NFR BLD AUTO: 0 % (ref 0–1)
BILIRUB SERPL-MCNC: 0.15 MG/DL (ref 0.2–1)
BILIRUB UR QL STRIP: NEGATIVE
BUN SERPL-MCNC: 38 MG/DL (ref 5–25)
CALCIUM SERPL-MCNC: 8.8 MG/DL (ref 8.3–10.1)
CHLORIDE SERPL-SCNC: 110 MMOL/L (ref 100–108)
CLARITY UR: CLEAR
CO2 SERPL-SCNC: 22 MMOL/L (ref 21–32)
COLOR UR: YELLOW
COLOR, POC: YELLOW
CREAT SERPL-MCNC: 1.95 MG/DL (ref 0.6–1.3)
EOSINOPHIL # BLD AUTO: 0.01 THOUSAND/ΜL (ref 0–0.61)
EOSINOPHIL NFR BLD AUTO: 0 % (ref 0–6)
ERYTHROCYTE [DISTWIDTH] IN BLOOD BY AUTOMATED COUNT: 12.7 % (ref 11.6–15.1)
EXT PREG TEST URINE: NEGATIVE
EXT. CONTROL ED NAV: NORMAL
GFR SERPL CREATININE-BSD FRML MDRD: 30 ML/MIN/1.73SQ M
GLUCOSE SERPL-MCNC: 107 MG/DL (ref 65–140)
GLUCOSE SERPL-MCNC: 133 MG/DL (ref 65–140)
GLUCOSE SERPL-MCNC: 86 MG/DL (ref 65–140)
GLUCOSE UR STRIP-MCNC: NEGATIVE MG/DL
HCT VFR BLD AUTO: 40.4 % (ref 34.8–46.1)
HGB BLD-MCNC: 13.2 G/DL (ref 11.5–15.4)
HGB UR QL STRIP.AUTO: ABNORMAL
IMM GRANULOCYTES # BLD AUTO: 0.03 THOUSAND/UL (ref 0–0.2)
IMM GRANULOCYTES NFR BLD AUTO: 0 % (ref 0–2)
KETONES UR STRIP-MCNC: NEGATIVE MG/DL
LEUKOCYTE ESTERASE UR QL STRIP: NEGATIVE
LIPASE SERPL-CCNC: 932 U/L (ref 73–393)
LYMPHOCYTES # BLD AUTO: 0.83 THOUSANDS/ΜL (ref 0.6–4.47)
LYMPHOCYTES NFR BLD AUTO: 9 % (ref 14–44)
MCH RBC QN AUTO: 30.4 PG (ref 26.8–34.3)
MCHC RBC AUTO-ENTMCNC: 32.7 G/DL (ref 31.4–37.4)
MCV RBC AUTO: 93 FL (ref 82–98)
MONOCYTES # BLD AUTO: 0.2 THOUSAND/ΜL (ref 0.17–1.22)
MONOCYTES NFR BLD AUTO: 2 % (ref 4–12)
NEUTROPHILS # BLD AUTO: 8.73 THOUSANDS/ΜL (ref 1.85–7.62)
NEUTS SEG NFR BLD AUTO: 89 % (ref 43–75)
NITRITE UR QL STRIP: NEGATIVE
NON-SQ EPI CELLS URNS QL MICRO: ABNORMAL /HPF
NRBC BLD AUTO-RTO: 0 /100 WBCS
PH UR STRIP.AUTO: 6 [PH] (ref 4.5–8)
PLATELET # BLD AUTO: 265 THOUSANDS/UL (ref 149–390)
PMV BLD AUTO: 9.6 FL (ref 8.9–12.7)
POTASSIUM SERPL-SCNC: 4.2 MMOL/L (ref 3.5–5.3)
PROT SERPL-MCNC: 8.3 G/DL (ref 6.4–8.2)
PROT UR STRIP-MCNC: ABNORMAL MG/DL
RBC # BLD AUTO: 4.34 MILLION/UL (ref 3.81–5.12)
RBC #/AREA URNS AUTO: ABNORMAL /HPF
SODIUM SERPL-SCNC: 143 MMOL/L (ref 136–145)
SP GR UR STRIP.AUTO: 1.02 (ref 1–1.03)
UROBILINOGEN UR QL STRIP.AUTO: 0.2 E.U./DL
WBC # BLD AUTO: 9.82 THOUSAND/UL (ref 4.31–10.16)
WBC #/AREA URNS AUTO: ABNORMAL /HPF

## 2020-11-10 PROCEDURE — 96374 THER/PROPH/DIAG INJ IV PUSH: CPT

## 2020-11-10 PROCEDURE — 96376 TX/PRO/DX INJ SAME DRUG ADON: CPT

## 2020-11-10 PROCEDURE — 99285 EMERGENCY DEPT VISIT HI MDM: CPT

## 2020-11-10 PROCEDURE — 96375 TX/PRO/DX INJ NEW DRUG ADDON: CPT

## 2020-11-10 PROCEDURE — 36415 COLL VENOUS BLD VENIPUNCTURE: CPT | Performed by: EMERGENCY MEDICINE

## 2020-11-10 PROCEDURE — G1004 CDSM NDSC: HCPCS

## 2020-11-10 PROCEDURE — 99220 PR INITIAL OBSERVATION CARE/DAY 70 MINUTES: CPT | Performed by: PHYSICIAN ASSISTANT

## 2020-11-10 PROCEDURE — 85025 COMPLETE CBC W/AUTO DIFF WBC: CPT | Performed by: EMERGENCY MEDICINE

## 2020-11-10 PROCEDURE — 96361 HYDRATE IV INFUSION ADD-ON: CPT

## 2020-11-10 PROCEDURE — 81025 URINE PREGNANCY TEST: CPT | Performed by: EMERGENCY MEDICINE

## 2020-11-10 PROCEDURE — 83690 ASSAY OF LIPASE: CPT | Performed by: EMERGENCY MEDICINE

## 2020-11-10 PROCEDURE — 74176 CT ABD & PELVIS W/O CONTRAST: CPT

## 2020-11-10 PROCEDURE — 99285 EMERGENCY DEPT VISIT HI MDM: CPT | Performed by: EMERGENCY MEDICINE

## 2020-11-10 PROCEDURE — 82948 REAGENT STRIP/BLOOD GLUCOSE: CPT

## 2020-11-10 PROCEDURE — 81001 URINALYSIS AUTO W/SCOPE: CPT

## 2020-11-10 PROCEDURE — 80053 COMPREHEN METABOLIC PANEL: CPT | Performed by: EMERGENCY MEDICINE

## 2020-11-10 RX ORDER — SODIUM CHLORIDE 9 MG/ML
125 INJECTION, SOLUTION INTRAVENOUS CONTINUOUS
Status: DISCONTINUED | OUTPATIENT
Start: 2020-11-10 | End: 2020-11-11 | Stop reason: HOSPADM

## 2020-11-10 RX ORDER — AMLODIPINE BESYLATE 5 MG/1
5 TABLET ORAL DAILY
Status: DISCONTINUED | OUTPATIENT
Start: 2020-11-11 | End: 2020-11-11 | Stop reason: HOSPADM

## 2020-11-10 RX ORDER — MAGNESIUM HYDROXIDE/ALUMINUM HYDROXICE/SIMETHICONE 120; 1200; 1200 MG/30ML; MG/30ML; MG/30ML
30 SUSPENSION ORAL ONCE
Status: COMPLETED | OUTPATIENT
Start: 2020-11-10 | End: 2020-11-10

## 2020-11-10 RX ORDER — CONJUGATED ESTROGENS 0.62 MG/G
CREAM VAGINAL
Qty: 30 G | Refills: 2 | Status: SHIPPED | OUTPATIENT
Start: 2020-11-10 | End: 2021-04-06 | Stop reason: SDUPTHER

## 2020-11-10 RX ORDER — BUPROPION HYDROCHLORIDE 150 MG/1
150 TABLET ORAL DAILY
Status: DISCONTINUED | OUTPATIENT
Start: 2020-11-11 | End: 2020-11-11 | Stop reason: HOSPADM

## 2020-11-10 RX ORDER — ONDANSETRON 2 MG/ML
4 INJECTION INTRAMUSCULAR; INTRAVENOUS EVERY 6 HOURS PRN
Status: DISCONTINUED | OUTPATIENT
Start: 2020-11-10 | End: 2020-11-11 | Stop reason: HOSPADM

## 2020-11-10 RX ORDER — CALCIUM CARBONATE 200(500)MG
1000 TABLET,CHEWABLE ORAL DAILY PRN
Status: DISCONTINUED | OUTPATIENT
Start: 2020-11-10 | End: 2020-11-11 | Stop reason: HOSPADM

## 2020-11-10 RX ORDER — MORPHINE SULFATE 4 MG/ML
4 INJECTION, SOLUTION INTRAMUSCULAR; INTRAVENOUS ONCE
Status: COMPLETED | OUTPATIENT
Start: 2020-11-10 | End: 2020-11-10

## 2020-11-10 RX ORDER — HEPARIN SODIUM 5000 [USP'U]/ML
5000 INJECTION, SOLUTION INTRAVENOUS; SUBCUTANEOUS EVERY 8 HOURS SCHEDULED
Status: DISCONTINUED | OUTPATIENT
Start: 2020-11-10 | End: 2020-11-11 | Stop reason: HOSPADM

## 2020-11-10 RX ORDER — TRAMADOL HYDROCHLORIDE 50 MG/1
50 TABLET ORAL EVERY 6 HOURS PRN
Status: DISCONTINUED | OUTPATIENT
Start: 2020-11-10 | End: 2020-11-11 | Stop reason: HOSPADM

## 2020-11-10 RX ORDER — PANTOPRAZOLE SODIUM 40 MG/1
40 TABLET, DELAYED RELEASE ORAL
Status: DISCONTINUED | OUTPATIENT
Start: 2020-11-11 | End: 2020-11-11 | Stop reason: HOSPADM

## 2020-11-10 RX ORDER — QUETIAPINE FUMARATE 200 MG/1
200 TABLET, FILM COATED ORAL
Status: DISCONTINUED | OUTPATIENT
Start: 2020-11-10 | End: 2020-11-11 | Stop reason: HOSPADM

## 2020-11-10 RX ORDER — HYDRALAZINE HYDROCHLORIDE 20 MG/ML
5 INJECTION INTRAMUSCULAR; INTRAVENOUS EVERY 4 HOURS PRN
Status: DISCONTINUED | OUTPATIENT
Start: 2020-11-10 | End: 2020-11-11 | Stop reason: HOSPADM

## 2020-11-10 RX ORDER — ACETAMINOPHEN 325 MG/1
650 TABLET ORAL EVERY 6 HOURS PRN
Status: DISCONTINUED | OUTPATIENT
Start: 2020-11-10 | End: 2020-11-11 | Stop reason: HOSPADM

## 2020-11-10 RX ORDER — DEXTROAMPHETAMINE SACCHARATE, AMPHETAMINE ASPARTATE, DEXTROAMPHETAMINE SULFATE AND AMPHETAMINE SULFATE 2.5; 2.5; 2.5; 2.5 MG/1; MG/1; MG/1; MG/1
20 TABLET ORAL
Status: DISCONTINUED | OUTPATIENT
Start: 2020-11-11 | End: 2020-11-11 | Stop reason: HOSPADM

## 2020-11-10 RX ORDER — ONDANSETRON 2 MG/ML
4 INJECTION INTRAMUSCULAR; INTRAVENOUS ONCE
Status: COMPLETED | OUTPATIENT
Start: 2020-11-10 | End: 2020-11-10

## 2020-11-10 RX ADMIN — MORPHINE SULFATE 2 MG: 2 INJECTION, SOLUTION INTRAMUSCULAR; INTRAVENOUS at 22:05

## 2020-11-10 RX ADMIN — MORPHINE SULFATE 4 MG: 4 INJECTION INTRAVENOUS at 14:31

## 2020-11-10 RX ADMIN — ONDANSETRON 4 MG: 2 INJECTION INTRAMUSCULAR; INTRAVENOUS at 12:07

## 2020-11-10 RX ADMIN — ALUMINUM HYDROXIDE, MAGNESIUM HYDROXIDE, AND SIMETHICONE 30 ML: 200; 200; 20 SUSPENSION ORAL at 17:30

## 2020-11-10 RX ADMIN — MORPHINE SULFATE 4 MG: 4 INJECTION INTRAVENOUS at 12:07

## 2020-11-10 RX ADMIN — FAMOTIDINE 20 MG: 10 INJECTION INTRAVENOUS at 17:30

## 2020-11-10 RX ADMIN — SODIUM CHLORIDE 1000 ML: 0.9 INJECTION, SOLUTION INTRAVENOUS at 12:06

## 2020-11-10 RX ADMIN — SODIUM CHLORIDE 125 ML/HR: 0.9 INJECTION, SOLUTION INTRAVENOUS at 19:45

## 2020-11-10 RX ADMIN — TRAMADOL HYDROCHLORIDE 50 MG: 50 TABLET, FILM COATED ORAL at 19:54

## 2020-11-10 RX ADMIN — QUETIAPINE FUMARATE 200 MG: 200 TABLET ORAL at 21:18

## 2020-11-10 RX ADMIN — HEPARIN SODIUM 5000 UNITS: 5000 INJECTION INTRAVENOUS; SUBCUTANEOUS at 21:18

## 2020-11-11 VITALS
OXYGEN SATURATION: 94 % | WEIGHT: 162.04 LBS | TEMPERATURE: 97.8 F | DIASTOLIC BLOOD PRESSURE: 96 MMHG | BODY MASS INDEX: 29.64 KG/M2 | SYSTOLIC BLOOD PRESSURE: 160 MMHG | RESPIRATION RATE: 20 BRPM | HEART RATE: 73 BPM

## 2020-11-11 PROBLEM — R74.8 ELEVATED LIPASE: Status: RESOLVED | Noted: 2020-11-10 | Resolved: 2020-11-11

## 2020-11-11 LAB
ALBUMIN SERPL BCP-MCNC: 3 G/DL (ref 3.5–5)
ALP SERPL-CCNC: 107 U/L (ref 46–116)
ALT SERPL W P-5'-P-CCNC: 20 U/L (ref 12–78)
ANION GAP SERPL CALCULATED.3IONS-SCNC: 10 MMOL/L (ref 4–13)
AST SERPL W P-5'-P-CCNC: 24 U/L (ref 5–45)
BILIRUB SERPL-MCNC: 0.23 MG/DL (ref 0.2–1)
BUN SERPL-MCNC: 25 MG/DL (ref 5–25)
CALCIUM ALBUM COR SERPL-MCNC: 9.3 MG/DL (ref 8.3–10.1)
CALCIUM SERPL-MCNC: 8.5 MG/DL (ref 8.3–10.1)
CHLORIDE SERPL-SCNC: 108 MMOL/L (ref 100–108)
CO2 SERPL-SCNC: 21 MMOL/L (ref 21–32)
CREAT SERPL-MCNC: 1.56 MG/DL (ref 0.6–1.3)
GFR SERPL CREATININE-BSD FRML MDRD: 39 ML/MIN/1.73SQ M
GLUCOSE P FAST SERPL-MCNC: 80 MG/DL (ref 65–99)
GLUCOSE SERPL-MCNC: 80 MG/DL (ref 65–140)
GLUCOSE SERPL-MCNC: 96 MG/DL (ref 65–140)
LIPASE SERPL-CCNC: 242 U/L (ref 73–393)
POTASSIUM SERPL-SCNC: 4 MMOL/L (ref 3.5–5.3)
PROT SERPL-MCNC: 7.1 G/DL (ref 6.4–8.2)
SODIUM SERPL-SCNC: 139 MMOL/L (ref 136–145)

## 2020-11-11 PROCEDURE — 83690 ASSAY OF LIPASE: CPT | Performed by: PHYSICIAN ASSISTANT

## 2020-11-11 PROCEDURE — 80053 COMPREHEN METABOLIC PANEL: CPT | Performed by: PHYSICIAN ASSISTANT

## 2020-11-11 PROCEDURE — 82948 REAGENT STRIP/BLOOD GLUCOSE: CPT

## 2020-11-11 PROCEDURE — 99217 PR OBSERVATION CARE DISCHARGE MANAGEMENT: CPT | Performed by: INTERNAL MEDICINE

## 2020-11-11 RX ADMIN — SODIUM CHLORIDE 125 ML/HR: 0.9 INJECTION, SOLUTION INTRAVENOUS at 03:51

## 2020-11-11 RX ADMIN — TRAMADOL HYDROCHLORIDE 50 MG: 50 TABLET, FILM COATED ORAL at 02:10

## 2020-11-11 RX ADMIN — AMLODIPINE BESYLATE 5 MG: 5 TABLET ORAL at 09:22

## 2020-11-11 RX ADMIN — DEXTROAMPHETAMINE SACCHARATE, AMPHETAMINE ASPARTATE, DEXTROAMPHETAMINE SULFATE AND AMPHETAMINE SULFATE 20 MG: 2.5; 2.5; 2.5; 2.5 TABLET ORAL at 06:09

## 2020-11-11 RX ADMIN — MORPHINE SULFATE 2 MG: 2 INJECTION, SOLUTION INTRAMUSCULAR; INTRAVENOUS at 04:36

## 2020-11-11 RX ADMIN — BUPROPION HYDROCHLORIDE 150 MG: 150 TABLET, FILM COATED, EXTENDED RELEASE ORAL at 09:22

## 2020-11-11 RX ADMIN — HEPARIN SODIUM 5000 UNITS: 5000 INJECTION INTRAVENOUS; SUBCUTANEOUS at 05:08

## 2020-11-11 RX ADMIN — ONDANSETRON 4 MG: 2 INJECTION INTRAMUSCULAR; INTRAVENOUS at 07:27

## 2020-11-11 RX ADMIN — PANTOPRAZOLE SODIUM 40 MG: 40 TABLET, DELAYED RELEASE ORAL at 06:09

## 2020-11-12 ENCOUNTER — TRANSITIONAL CARE MANAGEMENT (OUTPATIENT)
Dept: FAMILY MEDICINE CLINIC | Facility: CLINIC | Age: 50
End: 2020-11-12

## 2020-11-18 DIAGNOSIS — F31.9 BIPOLAR 1 DISORDER (HCC): ICD-10-CM

## 2020-11-19 ENCOUNTER — TELEMEDICINE (OUTPATIENT)
Dept: FAMILY MEDICINE CLINIC | Facility: CLINIC | Age: 50
End: 2020-11-19

## 2020-11-19 DIAGNOSIS — K85.90 ACUTE PANCREATITIS, UNSPECIFIED COMPLICATION STATUS, UNSPECIFIED PANCREATITIS TYPE: ICD-10-CM

## 2020-11-19 DIAGNOSIS — K59.00 CONSTIPATION, UNSPECIFIED CONSTIPATION TYPE: ICD-10-CM

## 2020-11-19 DIAGNOSIS — Z09 HOSPITAL DISCHARGE FOLLOW-UP: Primary | ICD-10-CM

## 2020-11-19 PROCEDURE — 1111F DSCHRG MED/CURRENT MED MERGE: CPT | Performed by: PHYSICIAN ASSISTANT

## 2020-11-19 PROCEDURE — 99495 TRANSJ CARE MGMT MOD F2F 14D: CPT | Performed by: PHYSICIAN ASSISTANT

## 2020-11-19 RX ORDER — DEXTROAMPHETAMINE SACCHARATE, AMPHETAMINE ASPARTATE MONOHYDRATE, DEXTROAMPHETAMINE SULFATE AND AMPHETAMINE SULFATE 5; 5; 5; 5 MG/1; MG/1; MG/1; MG/1
20 CAPSULE, EXTENDED RELEASE ORAL 2 TIMES DAILY
Qty: 60 CAPSULE | Refills: 0 | Status: SHIPPED | OUTPATIENT
Start: 2020-11-19 | End: 2020-12-21 | Stop reason: SDUPTHER

## 2020-11-19 RX ORDER — DOCUSATE SODIUM 100 MG/1
100 CAPSULE, LIQUID FILLED ORAL 2 TIMES DAILY
Qty: 180 CAPSULE | Refills: 1 | Status: SHIPPED | OUTPATIENT
Start: 2020-11-19 | End: 2021-05-24

## 2020-11-20 DIAGNOSIS — M54.42 CHRONIC LOW BACK PAIN WITH BILATERAL SCIATICA, UNSPECIFIED BACK PAIN LATERALITY: ICD-10-CM

## 2020-11-20 DIAGNOSIS — M54.41 CHRONIC LOW BACK PAIN WITH BILATERAL SCIATICA, UNSPECIFIED BACK PAIN LATERALITY: ICD-10-CM

## 2020-11-20 DIAGNOSIS — G89.29 CHRONIC LOW BACK PAIN WITH BILATERAL SCIATICA, UNSPECIFIED BACK PAIN LATERALITY: ICD-10-CM

## 2020-11-20 RX ORDER — TIZANIDINE HYDROCHLORIDE 4 MG/1
CAPSULE, GELATIN COATED ORAL
Qty: 90 CAPSULE | Refills: 0 | Status: SHIPPED | OUTPATIENT
Start: 2020-11-20 | End: 2020-12-27

## 2020-11-24 LAB
LEFT EYE DIABETIC RETINOPATHY: NORMAL
RIGHT EYE DIABETIC RETINOPATHY: NORMAL

## 2020-12-02 DIAGNOSIS — J45.20 MILD INTERMITTENT ASTHMA WITHOUT COMPLICATION: Chronic | ICD-10-CM

## 2020-12-02 RX ORDER — ALBUTEROL SULFATE 90 UG/1
AEROSOL, METERED RESPIRATORY (INHALATION)
Qty: 18 G | Refills: 2 | Status: SHIPPED | OUTPATIENT
Start: 2020-12-02 | End: 2021-01-31

## 2020-12-02 RX ORDER — ALBUTEROL SULFATE 2.5 MG/3ML
SOLUTION RESPIRATORY (INHALATION)
Qty: 150 ML | Refills: 0 | Status: SHIPPED | OUTPATIENT
Start: 2020-12-02 | End: 2020-12-18

## 2020-12-07 DIAGNOSIS — M31.30 WEGENER'S GRANULOMATOSIS: Chronic | ICD-10-CM

## 2020-12-07 DIAGNOSIS — G89.4 CHRONIC PAIN SYNDROME: ICD-10-CM

## 2020-12-07 RX ORDER — OXYCODONE AND ACETAMINOPHEN 7.5; 325 MG/1; MG/1
1 TABLET ORAL EVERY 4 HOURS PRN
Qty: 150 TABLET | Refills: 0 | Status: SHIPPED | OUTPATIENT
Start: 2020-12-07 | End: 2021-01-05 | Stop reason: SDUPTHER

## 2020-12-09 DIAGNOSIS — G89.4 CHRONIC PAIN SYNDROME: ICD-10-CM

## 2020-12-10 RX ORDER — LIDOCAINE 50 MG/G
OINTMENT TOPICAL
Qty: 250 G | Refills: 8 | Status: SHIPPED | OUTPATIENT
Start: 2020-12-10

## 2020-12-17 DIAGNOSIS — J45.20 MILD INTERMITTENT ASTHMA WITHOUT COMPLICATION: Chronic | ICD-10-CM

## 2020-12-18 RX ORDER — ALBUTEROL SULFATE 2.5 MG/3ML
SOLUTION RESPIRATORY (INHALATION)
Qty: 150 ML | Refills: 0 | Status: SHIPPED | OUTPATIENT
Start: 2020-12-18 | End: 2021-01-11 | Stop reason: SDUPTHER

## 2020-12-21 DIAGNOSIS — F31.9 BIPOLAR 1 DISORDER (HCC): ICD-10-CM

## 2020-12-21 RX ORDER — DEXTROAMPHETAMINE SACCHARATE, AMPHETAMINE ASPARTATE MONOHYDRATE, DEXTROAMPHETAMINE SULFATE AND AMPHETAMINE SULFATE 5; 5; 5; 5 MG/1; MG/1; MG/1; MG/1
20 CAPSULE, EXTENDED RELEASE ORAL 2 TIMES DAILY
Qty: 60 CAPSULE | Refills: 0 | Status: SHIPPED | OUTPATIENT
Start: 2020-12-21 | End: 2021-01-19 | Stop reason: SDUPTHER

## 2020-12-24 DIAGNOSIS — G89.29 CHRONIC LOW BACK PAIN WITH BILATERAL SCIATICA, UNSPECIFIED BACK PAIN LATERALITY: ICD-10-CM

## 2020-12-24 DIAGNOSIS — M54.41 CHRONIC LOW BACK PAIN WITH BILATERAL SCIATICA, UNSPECIFIED BACK PAIN LATERALITY: ICD-10-CM

## 2020-12-24 DIAGNOSIS — M54.42 CHRONIC LOW BACK PAIN WITH BILATERAL SCIATICA, UNSPECIFIED BACK PAIN LATERALITY: ICD-10-CM

## 2020-12-24 RX ORDER — BUPROPION HYDROCHLORIDE 300 MG/1
300 TABLET ORAL EVERY MORNING
COMMUNITY
Start: 2020-12-04 | End: 2022-01-18 | Stop reason: SDUPTHER

## 2020-12-24 RX ORDER — NALOXEGOL OXALATE 25 MG/1
TABLET, FILM COATED ORAL
COMMUNITY
Start: 2020-11-27 | End: 2021-04-27 | Stop reason: HOSPADM

## 2020-12-24 RX ORDER — DICYCLOMINE HYDROCHLORIDE 10 MG/1
CAPSULE ORAL
COMMUNITY
Start: 2020-11-27 | End: 2021-09-29 | Stop reason: ALTCHOICE

## 2020-12-24 RX ORDER — POLYETHYLENE GLYCOL 3350 17 G/17G
POWDER, FOR SOLUTION ORAL
COMMUNITY
Start: 2020-11-30 | End: 2021-01-31

## 2020-12-24 RX ORDER — FLUCONAZOLE 150 MG/1
TABLET ORAL
COMMUNITY
Start: 2020-10-26 | End: 2021-02-09 | Stop reason: SDUPTHER

## 2020-12-27 RX ORDER — TIZANIDINE HYDROCHLORIDE 4 MG/1
CAPSULE, GELATIN COATED ORAL
Qty: 90 CAPSULE | Refills: 0 | Status: SHIPPED | OUTPATIENT
Start: 2020-12-27 | End: 2021-01-19 | Stop reason: SDUPTHER

## 2020-12-28 ENCOUNTER — OFFICE VISIT (OUTPATIENT)
Dept: FAMILY MEDICINE CLINIC | Facility: CLINIC | Age: 50
End: 2020-12-28

## 2020-12-28 VITALS
OXYGEN SATURATION: 97 % | BODY MASS INDEX: 29.44 KG/M2 | SYSTOLIC BLOOD PRESSURE: 114 MMHG | RESPIRATION RATE: 16 BRPM | TEMPERATURE: 98 F | HEART RATE: 76 BPM | DIASTOLIC BLOOD PRESSURE: 82 MMHG | HEIGHT: 62 IN | WEIGHT: 160 LBS

## 2020-12-28 DIAGNOSIS — M31.31 GRANULOMATOSIS WITH POLYANGIITIS WITH RENAL INVOLVEMENT (HCC): Chronic | ICD-10-CM

## 2020-12-28 DIAGNOSIS — N18.4 BENIGN HYPERTENSION WITH CKD (CHRONIC KIDNEY DISEASE) STAGE IV (HCC): Chronic | ICD-10-CM

## 2020-12-28 DIAGNOSIS — E11.22 TYPE 2 DIABETES MELLITUS WITH STAGE 4 CHRONIC KIDNEY DISEASE, UNSPECIFIED WHETHER LONG TERM INSULIN USE (HCC): ICD-10-CM

## 2020-12-28 DIAGNOSIS — F31.9 BIPOLAR 1 DISORDER (HCC): ICD-10-CM

## 2020-12-28 DIAGNOSIS — K31.84 GASTROPARESIS: ICD-10-CM

## 2020-12-28 DIAGNOSIS — K21.9 GASTROESOPHAGEAL REFLUX DISEASE, UNSPECIFIED WHETHER ESOPHAGITIS PRESENT: Primary | ICD-10-CM

## 2020-12-28 DIAGNOSIS — B37.3 VAGINAL YEAST INFECTION: ICD-10-CM

## 2020-12-28 DIAGNOSIS — K58.9 IRRITABLE BOWEL SYNDROME, UNSPECIFIED TYPE: ICD-10-CM

## 2020-12-28 DIAGNOSIS — N18.4 CKD (CHRONIC KIDNEY DISEASE) STAGE 4, GFR 15-29 ML/MIN (HCC): ICD-10-CM

## 2020-12-28 DIAGNOSIS — R29.898 WEAKNESS OF BOTH LOWER EXTREMITIES: ICD-10-CM

## 2020-12-28 DIAGNOSIS — F90.9 ATTENTION DEFICIT HYPERACTIVITY DISORDER (ADHD), UNSPECIFIED ADHD TYPE: ICD-10-CM

## 2020-12-28 DIAGNOSIS — J45.20 MILD INTERMITTENT ASTHMA WITHOUT COMPLICATION: Chronic | ICD-10-CM

## 2020-12-28 DIAGNOSIS — N18.4 TYPE 2 DIABETES MELLITUS WITH STAGE 4 CHRONIC KIDNEY DISEASE, UNSPECIFIED WHETHER LONG TERM INSULIN USE (HCC): ICD-10-CM

## 2020-12-28 DIAGNOSIS — M31.7 MPA (MICROSCOPIC POLYANGIITIS) (HCC): Chronic | ICD-10-CM

## 2020-12-28 DIAGNOSIS — I12.9 BENIGN HYPERTENSION WITH CKD (CHRONIC KIDNEY DISEASE) STAGE IV (HCC): Chronic | ICD-10-CM

## 2020-12-28 PROCEDURE — 1036F TOBACCO NON-USER: CPT | Performed by: PHYSICIAN ASSISTANT

## 2020-12-28 PROCEDURE — 3066F NEPHROPATHY DOC TX: CPT | Performed by: PHYSICIAN ASSISTANT

## 2020-12-28 PROCEDURE — 3008F BODY MASS INDEX DOCD: CPT | Performed by: PHYSICIAN ASSISTANT

## 2020-12-28 PROCEDURE — 99215 OFFICE O/P EST HI 40 MIN: CPT | Performed by: PHYSICIAN ASSISTANT

## 2020-12-28 RX ORDER — SUCRALFATE ORAL 1 G/10ML
1 SUSPENSION ORAL
Qty: 420 ML | Refills: 1 | Status: SHIPPED | OUTPATIENT
Start: 2020-12-28 | End: 2021-04-27 | Stop reason: HOSPADM

## 2020-12-28 RX ORDER — FLUCONAZOLE 150 MG/1
150 TABLET ORAL ONCE
Qty: 1 TABLET | Refills: 5 | Status: SHIPPED | OUTPATIENT
Start: 2020-12-28 | End: 2020-12-28

## 2020-12-29 DIAGNOSIS — N18.30 CKD (CHRONIC KIDNEY DISEASE) STAGE 3, GFR 30-59 ML/MIN (HCC): ICD-10-CM

## 2020-12-29 RX ORDER — AMLODIPINE BESYLATE 5 MG/1
TABLET ORAL
Qty: 90 TABLET | Refills: 2 | Status: SHIPPED | OUTPATIENT
Start: 2020-12-29 | End: 2021-09-07

## 2020-12-31 DIAGNOSIS — Z79.4 TYPE 2 DIABETES MELLITUS WITHOUT COMPLICATION, WITH LONG-TERM CURRENT USE OF INSULIN (HCC): ICD-10-CM

## 2020-12-31 DIAGNOSIS — E11.9 TYPE 2 DIABETES MELLITUS WITHOUT COMPLICATION, WITH LONG-TERM CURRENT USE OF INSULIN (HCC): ICD-10-CM

## 2020-12-31 RX ORDER — BLOOD-GLUCOSE METER
KIT MISCELLANEOUS
Qty: 100 EACH | Refills: 2 | Status: SHIPPED | OUTPATIENT
Start: 2020-12-31 | End: 2021-04-01

## 2021-01-04 NOTE — ASSESSMENT & PLAN NOTE
Following up with GI at [facility]  Is in the process of getting a pacemaker to help with digestion  ear L/ear R/nose/mouth/pharynx detailed exam details… No oral lesions; no gross abnormalities

## 2021-01-05 DIAGNOSIS — M31.30 WEGENER'S GRANULOMATOSIS: Chronic | ICD-10-CM

## 2021-01-05 DIAGNOSIS — G89.4 CHRONIC PAIN SYNDROME: ICD-10-CM

## 2021-01-05 RX ORDER — OXYCODONE AND ACETAMINOPHEN 7.5; 325 MG/1; MG/1
1 TABLET ORAL EVERY 4 HOURS PRN
Qty: 150 TABLET | Refills: 0 | Status: SHIPPED | OUTPATIENT
Start: 2021-01-05 | End: 2021-02-08 | Stop reason: SDUPTHER

## 2021-01-11 DIAGNOSIS — J45.20 MILD INTERMITTENT ASTHMA WITHOUT COMPLICATION: Chronic | ICD-10-CM

## 2021-01-11 RX ORDER — ALBUTEROL SULFATE 2.5 MG/3ML
2.5 SOLUTION RESPIRATORY (INHALATION) EVERY 6 HOURS PRN
Qty: 150 ML | Refills: 0 | Status: SHIPPED | OUTPATIENT
Start: 2021-01-11 | End: 2021-05-01 | Stop reason: HOSPADM

## 2021-01-19 DIAGNOSIS — G89.29 CHRONIC LOW BACK PAIN WITH BILATERAL SCIATICA, UNSPECIFIED BACK PAIN LATERALITY: ICD-10-CM

## 2021-01-19 DIAGNOSIS — F31.9 BIPOLAR 1 DISORDER (HCC): ICD-10-CM

## 2021-01-19 DIAGNOSIS — M54.41 CHRONIC LOW BACK PAIN WITH BILATERAL SCIATICA, UNSPECIFIED BACK PAIN LATERALITY: ICD-10-CM

## 2021-01-19 DIAGNOSIS — M54.42 CHRONIC LOW BACK PAIN WITH BILATERAL SCIATICA, UNSPECIFIED BACK PAIN LATERALITY: ICD-10-CM

## 2021-01-19 RX ORDER — DEXTROAMPHETAMINE SACCHARATE, AMPHETAMINE ASPARTATE MONOHYDRATE, DEXTROAMPHETAMINE SULFATE AND AMPHETAMINE SULFATE 5; 5; 5; 5 MG/1; MG/1; MG/1; MG/1
20 CAPSULE, EXTENDED RELEASE ORAL 2 TIMES DAILY
Qty: 60 CAPSULE | Refills: 0 | Status: SHIPPED | OUTPATIENT
Start: 2021-01-19 | End: 2021-02-19 | Stop reason: SDUPTHER

## 2021-01-19 RX ORDER — TIZANIDINE HYDROCHLORIDE 4 MG/1
4 CAPSULE, GELATIN COATED ORAL 3 TIMES DAILY
Qty: 90 CAPSULE | Refills: 2 | Status: SHIPPED | OUTPATIENT
Start: 2021-01-19 | End: 2021-04-12

## 2021-01-29 DIAGNOSIS — J45.20 MILD INTERMITTENT ASTHMA WITHOUT COMPLICATION: Chronic | ICD-10-CM

## 2021-01-29 DIAGNOSIS — K58.9 IRRITABLE BOWEL SYNDROME, UNSPECIFIED TYPE: Primary | ICD-10-CM

## 2021-01-31 RX ORDER — ALBUTEROL SULFATE 90 UG/1
AEROSOL, METERED RESPIRATORY (INHALATION)
Qty: 18 G | Refills: 1 | Status: SHIPPED | OUTPATIENT
Start: 2021-01-31 | End: 2021-03-17 | Stop reason: SDUPTHER

## 2021-01-31 RX ORDER — POLYETHYLENE GLYCOL 3350 17 G/17G
POWDER, FOR SOLUTION ORAL
Qty: 510 G | Refills: 1 | Status: SHIPPED | OUTPATIENT
Start: 2021-01-31 | End: 2021-05-01 | Stop reason: HOSPADM

## 2021-02-01 ENCOUNTER — TELEMEDICINE (OUTPATIENT)
Dept: FAMILY MEDICINE CLINIC | Facility: CLINIC | Age: 51
End: 2021-02-01

## 2021-02-01 DIAGNOSIS — J45.20 MILD INTERMITTENT ASTHMA WITHOUT COMPLICATION: Chronic | ICD-10-CM

## 2021-02-01 DIAGNOSIS — F90.9 ATTENTION DEFICIT HYPERACTIVITY DISORDER (ADHD), UNSPECIFIED ADHD TYPE: ICD-10-CM

## 2021-02-01 DIAGNOSIS — I12.9 BENIGN HYPERTENSION WITH CKD (CHRONIC KIDNEY DISEASE) STAGE IV (HCC): Chronic | ICD-10-CM

## 2021-02-01 DIAGNOSIS — N18.4 CKD (CHRONIC KIDNEY DISEASE) STAGE 4, GFR 15-29 ML/MIN (HCC): ICD-10-CM

## 2021-02-01 DIAGNOSIS — G62.9 NEUROPATHY: ICD-10-CM

## 2021-02-01 DIAGNOSIS — G62.9 SMALL FIBER NEUROPATHY: ICD-10-CM

## 2021-02-01 DIAGNOSIS — B07.0 PLANTAR WART, RIGHT FOOT: ICD-10-CM

## 2021-02-01 DIAGNOSIS — R29.898 WEAKNESS OF BOTH LOWER EXTREMITIES: ICD-10-CM

## 2021-02-01 DIAGNOSIS — F31.9 BIPOLAR 1 DISORDER (HCC): ICD-10-CM

## 2021-02-01 DIAGNOSIS — K21.9 GASTROESOPHAGEAL REFLUX DISEASE, UNSPECIFIED WHETHER ESOPHAGITIS PRESENT: Primary | ICD-10-CM

## 2021-02-01 DIAGNOSIS — N18.4 BENIGN HYPERTENSION WITH CKD (CHRONIC KIDNEY DISEASE) STAGE IV (HCC): Chronic | ICD-10-CM

## 2021-02-01 DIAGNOSIS — K31.84 GASTROPARESIS: ICD-10-CM

## 2021-02-01 DIAGNOSIS — M31.31 GRANULOMATOSIS WITH POLYANGIITIS WITH RENAL INVOLVEMENT (HCC): Chronic | ICD-10-CM

## 2021-02-01 PROCEDURE — 3066F NEPHROPATHY DOC TX: CPT | Performed by: PHYSICIAN ASSISTANT

## 2021-02-01 PROCEDURE — G2025 DIS SITE TELE SVCS RHC/FQHC: HCPCS | Performed by: PHYSICIAN ASSISTANT

## 2021-02-01 NOTE — PROGRESS NOTES
Virtual Brief Visit    Assessment/Plan:    Problem List Items Addressed This Visit        Digestive    Gastroesophageal reflux disease - Primary    Gastroparesis       Respiratory    Asthma (Chronic)       Cardiovascular and Mediastinum    Wegener's granulomatosis (HCC) (Chronic)    Benign hypertension with CKD (chronic kidney disease) stage IV ([facility]) (Chronic)       Nervous and Auditory    Weakness of both lower extremities    Relevant Orders    Ambulatory referral to Neurology    Small fiber neuropathy    Neuropathy    Relevant Orders    Ambulatory referral to Neurology    Ambulatory referral to Podiatry       Musculoskeletal and Integument    Plantar wart, right foot    Relevant Orders    Ambulatory referral to Podiatry       Genitourinary    CKD (chronic kidney disease) stage 4, GFR 15-29 ml/min ([facility])       Other    Bipolar 1 disorder ([facility])    Attention deficit hyperactivity disorder (ADHD)          Gastroparesis/GERD  - Status post laparoscopic pyloroplasty with gastric stimulator placement on 06/22/2020 with [address] taking Protonix 40 mg twice daily, Bentyl, and carafate  Patient was referred to Gastroenterology at last office visit, but patient has yet to receive a call to schedule the appointment  - Will send message to referral team to further assist patient in scheduling an appointment    - Advised to follow-up with [facility] as scheduled  Chronic kidney disease, stage IV  - Overall renal function remains quite stable     - Advised to continue follow-up with nephrology as scheduled      Asthma  - Stable   Continue albuterol inhaler as needed and nebulizer as needed      Type 2 diabetes mellitus  - Last A1c from September 2020 was 5 8   - Continue NovoLog 6 units, 3 times daily with meals and Toujeo 22 units once daily  Hypertension  - Unable to assess blood pressure today due to telemedicine visit  - Continue amlodipine 5 mg once daily    - Continue to maintain healthy balanced diet with focus on low salt intake  Limit alcohol intake  - Advised to exercise at least 30 minutes a day for at least 5 days out of the week      Weakness in lower extremities/cataplexy/Wegener's granulomatosis/small fiber neuropathy  - Due to worsening bilateral lower extremity neuropathy, will place updated referral to Neurology so patient could be scheduled for follow-up visit  - Continue using power chair when needed  - Continue Percocet 7 5-325 mg, every 4 hours as needed for severe pain      Bipolar 1 disorder/ADHD  - Stable   Continue Adderall XR 20 mg twice daily and Seroquel 100 mg once daily at bedtime  Reason for visit is   Chief Complaint   Patient presents with    Virtual Brief Visit        Encounter provider Juan Mcmillan PA-C    Provider located at [address] [place] [address]  [address] [id number] [phone]    Recent Visits  No visits were found meeting these conditions  Showing recent visits within past 7 days and meeting all other requirements     Today's Visits  Date Type Provider Dept   02/01/21 Telemedicine Katarzyna Thrasher PA-C  [facility]   Showing today's visits and meeting all other requirements     Future Appointments  No visits were found meeting these conditions  Showing future appointments within next 150 days and meeting all other requirements        After connecting through [employer] and patient was informed that this is a secure, HIPAA-compliant platform  She agrees to proceed  , the patient was identified by name and date of birth  Kyle Lainez was informed that this is a telemedicine visit and that the visit is being conducted through [facility] and patient was informed that this is a secure, HIPAA-compliant platform  She agrees to proceed     My office door was closed  No one else was in the room  She acknowledged consent and understanding of privacy and security of the platform   The patient has agreed to participate and understands she can discontinue the visit at any time  Patient is aware this is a billable service  Subjective    Eloisa Cox is a 48 y o  female with known PMH of  irritable bowel syndrome, gastroparesis, GERD, pancreatitis, type 2 diabetes mellitus, allergic rhinitis, asthma, hypertension, Wegener's granulomatosis, microscopic ployangiitis, weakness of both lower extremities, smell disturbance, small fiber neuropathy, post herpetic neuralgia, cataplexy, trochanteric bursitis of left hip, chronic kidney disease, stage IV, dyslipidemia, chronic pain, bipolar disorder, anemia who  Is seen today via telemedicine for numbness of feet  - Patient notes for the past 2 weeks she has noticed the numbness of bilateral feet has been worsening  Patient notes she also has some tingling of both feet  Patient notes she was previously diagnosed with " very bad"  Neuropathy  Patient notes it has been a long time since she has seen neurology and Podiatry  Patient would like follow-up visits with them  Also, patient notes she has history of 2 different surgeries where she had a vein "replanted"  Patient notes she had a vein popping out from the back of her right lower leg and she had the vein "pushed down"  Patient notes the nerve is now popping out again  Patient notes the last time she had surgery completed was in her early 25s  Patient notes that was completed in [place]  - Of note, patient notes she has yet to hear about an appointment with gastroenterology  Patient notes she will randomly throw up when she is stressed out  However, patient notes now when she vomits, she will experience abdominal pain  Patient notes it feels like the pain she usually feels when she has pancreatitis  Patient notes she is now nervous to eat because she does not want to throw up    Also, patient notes she is nervous to take her pain medication on an empty stomach because she will also trow up      - Patient notes she was previously receiving OMT from her previous PCP and that was helpful for her back  Patient is interested in receiving this again  Patient would also like to follow-up with Podiatry  Chronic kidney disease, stage IV:  Follows with nephrology  Lucio Falling creatinine in the low 2s, high 1s   Underlying disease is secondary to previous MPA vasculitis status post Cytoxan/steroids and plasmapheresis        Type 2 diabetes mellitus:  Currently taking NovoLog 6 units, 3 times daily with meals and Toujeo 22 units once daily      Hypertension:  Currently taking amlodipine 5 mg once daily      Bipolar 1 disorder/ADHD:  Currently taking Adderall XR 20 mg twice daily and Seroquel 100 mg once daily at bedtime      Past Medical History:   Diagnosis Date    ADHD     Anemia of chronic disease     Anxiety     Asthma     Asthma     Borderline personality disorder (HCC)     Cataplexy     Chronic abdominal pain     CKD (chronic kidney disease) stage 3, GFR 30-59 ml/min     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[facility]     Cushing syndrome ([facility]     Diabetes mellitus ([facility] )     DVT (deep venous thrombosis) (HCC)     GERD (gastroesophageal reflux disease)     History of acute pancreatitis     felt secondary to Bactrim    History of transfusion     HTN (hypertension)     Hypertension     Liver disease     fatty liver    Microscopic polyangiitis (HCC)     Morbid obesity (HCC)     MPA (microscopic polyangiitis) (HCC)     Ovarian cyst     PTSD (post-traumatic stress disorder)     Renal disorder     Self-inflicted injury     self inflicted skin wounds    Wegener's granulomatosis with renal involvement ([facility] ) 2015       Past Surgical History:   Procedure Laterality Date    ESOPHAGOGASTRODUODENOSCOPY  09/11/2015    mild antral gastritis    GASTRIC STIMULATOR IMPLANT SURGERY  06/25/2020    ID COLONOSCOPY FLX DX W/COLLJ SPEC WHEN PFRMD N/A 12/14/2018    Procedure: COLONOSCOPY with polypectomy;  Surgeon: Lisha Hutchins Liz SUGGS MD;  Location: [facility]; Service: Gastroenterology    CA ESOPHAGOGASTRODUODENOSCOPY TRANSORAL DIAGNOSTIC N/A 12/14/2018    Procedure: ESOPHAGOGASTRODUODENOSCOPY (EGD) with biopsy;  Surgeon: Cherelle Templeton MD;  Location: [facility];   Service: Gastroenterology    RELEASE SCAR CONTRACTURE / GRAFT REPAIRS OF HAND Bilateral        Current Outpatient Medications   Medication Sig Dispense Refill    albuterol (2 5 mg/3 mL) 0 083 % nebulizer solution Take 1 vial (2 5 mg total) by nebulization every 6 (six) hours as needed for wheezing or shortness of breath 150 mL 0    albuterol (PROVENTIL HFA,VENTOLIN HFA) 90 mcg/act inhaler INHALE 2 PUFFS BY MOUTH EVERY SIX HOURS AS NEEDED FOR WHEEZING OR SHORTNESS OF BREATH 18 g 1    Alcohol Swabs (ALCOHOL PADS) 70 % PADS by Does not apply route 4 (four) times a day 400 each 3    amLODIPine (NORVASC) 5 mg tablet TAKE ONE TABLET BY MOUTH ONCE DAILY 90 tablet 2    amphetamine-dextroamphetamine (ADDERALL XR) 20 MG 24 hr capsule Take 1 capsule (20 mg total) by mouth 2 (two) times a dayMax Daily Amount: 40 mg 60 capsule 0    Blood Glucose Monitoring Suppl (FREESTYLE LITE) DEIRDRE by Does not apply route daily 1 each 0    buPROPion (WELLBUTRIN XL) 150 mg 24 hr tablet bupropion HCl  mg 24 hr tablet, extended release      buPROPion (WELLBUTRIN XL) 300 mg 24 hr tablet       conjugated estrogens (Premarin) vaginal cream INSERT 1 GRAM PER VAGINA DAILY AT BEDTIME  FOR TWO WEEKS AND THEN CHANGE TO 1 GRAM TWICE WEEKLY 30 g 2    cycloSPORINE (RESTASIS) 0 05 % ophthalmic emulsion Administer 1 drop to both eyes 2 (two) times a day      dicyclomine (BENTYL) 10 mg capsule       docusate sodium (COLACE) 100 mg capsule Take 1 capsule (100 mg total) by mouth 2 (two) times a day 180 capsule 1    Easy Comfort Lancets MISC USE THREE TIMES A  each 3    fluconazole (DIFLUCAN) 150 mg tablet       FREESTYLE LITE test strip USE TO TEST THE BLOOD SUGAR THREE TIMES A  each 2    insulin aspart (NovoLOG FlexPen) 100 UNIT/ML injection pen Inject 6 Units under the skin 3 (three) times a day with meals 15 mL 2    insulin glargine (Toujeo Max SoloStar) 300 units/mL CONCETRATED U-300 injection pen (2-unit dial) Inject 22 Units under the skin daily 6 pen 1    Insulin Pen Needle (PEN NEEDLES) 31G X 8 MM MISC Inject 1 Stick under the skin 4 (four) times a day (with meals and at bedtime) 100 each 6    lidocaine (XYLOCAINE) 5 % ointment APPLY TOPICALLY 2GM TWICE A DAY TO AFFECTED AREAS AS NEEDED FOR MILD PAIN 250 g 8    montelukast (SINGULAIR) 10 mg tablet TAKE ONE TABLET BY MOUTH ONCE DAILY AT BEDTIME 90 tablet 2    Movantik 25 MG tablet       Nutritional Supplements (Ensure Original) LIQD Take 1 Bottle by mouth 2 (two) times a day 60 Bottle 11    ondansetron (ZOFRAN) 4 mg tablet Take 1 tablet (4 mg total) by mouth every 8 (eight) hours as needed for nausea or vomiting 30 tablet 2    oxyCODONE-acetaminophen (PERCOCET) 7 5-325 MG per tablet Take 1 tablet by mouth every 4 (four) hours as needed for severe painMax Daily Amount: 5 tablets 150 tablet 0    pantoprazole (PROTONIX) 40 mg tablet Take 1 tablet (40 mg total) by mouth 2 (two) times a day before meals 60 tablet 5    polyethylene glycol (GLYCOLAX) 17 GM/SCOOP powder DISSOLVE 17 GRAMS(ONE CAPFUL) IN 8 OUNCE OF WATER/JUICE & TAKE BY MOUTH DAILY AS DIRECTED *MAXIMUM ONE DOSE DAILY* 510 g 1    Probiotic Product (PROBIOTIC-10 PO) Take 1 tablet by mouth daily      QUEtiapine (SEROquel) 200 mg tablet Take 200 mg by mouth daily at bedtime      scopolamine (TRANSDERM-SCOP) 1 5 mg/3 days TD 72 hr patch Place 1 patch on the skin every third day 10 patch 3    sucralfate (CARAFATE) 1 g/10 mL suspension Take 10 mL (1 g total) by mouth 4 (four) times a day (with meals and at bedtime) 420 mL 1    TiZANidine (ZANAFLEX) 4 MG capsule Take 1 capsule (4 mg total) by mouth 3 (three) times a day 90 capsule 2     No current facility-administered medications for this visit  Allergies   Allergen Reactions    Prozac [Fluoxetine Hcl]      SI    Bactrim [Sulfamethoxazole-Trimethoprim]      Pt "They think that is what cause the pancreatitis"     Lamictal [Lamotrigine] GI Intolerance    Lithium Other (See Comments)    Amlodipine Hives    Haldol [Haloperidol] Other (See Comments)     "I don't like it"    Ibuprofen     Lexapro [Escitalopram Oxalate] Rash    Navane [Thiothixene]      SI    Other      "novaine?" antipsychotic       Review of Systems   Constitutional: Negative for chills, fatigue and fever  HENT: Negative for congestion  Eyes: Negative for visual disturbance  Respiratory: Negative for cough, chest tightness, shortness of breath and wheezing  Cardiovascular: Negative for chest pain and palpitations  Gastrointestinal: Positive for abdominal pain and nausea  Negative for abdominal distention, constipation, diarrhea and vomiting  Gastric reflux   Endocrine: Negative for polydipsia, polyphagia and polyuria  Musculoskeletal: Positive for arthralgias (Left hip, right foot), gait problem and myalgias  Negative for joint swelling and neck stiffness  Skin: Negative for rash and wound  Neurological: Positive for weakness and numbness  Negative for dizziness, tremors, light-headedness and headaches  Psychiatric/Behavioral: Negative for dysphoric mood, sleep disturbance and suicidal ideas  The patient is not nervous/anxious  There were no vitals filed for this visit  I spent 19 minutes directly with the patient during this visit     It was my intent to perform this visit via video technology but the patient was not able to do a video connection so the visit was completed via audio telephone only  VIRTUAL VISIT DISCLAIMER    Brady Jc acknowledges that she has consented to an online visit or consultation   She understands that the online visit is based solely on information provided by her, and that, in the absence of a face-to-face physical evaluation by the physician, the diagnosis she receives is both limited and provisional in terms of accuracy and completeness  This is not intended to replace a full medical face-to-face evaluation by the physician  Cem Chaparro understands and accepts these terms

## 2021-02-03 ENCOUNTER — TELEPHONE (OUTPATIENT)
Dept: FAMILY MEDICINE CLINIC | Facility: CLINIC | Age: 51
End: 2021-02-03

## 2021-02-03 NOTE — TELEPHONE ENCOUNTER
I was helping patient schedule appointments and she asked if she could have an appointment today  Can someone reach out to patient about getting her a telemedicine visit scheduled today? She said she is having pain with light contact to her stomach or hip? The call was breaking up a little and I'm not sure I heard correctly  Thank you!

## 2021-02-08 DIAGNOSIS — G89.4 CHRONIC PAIN SYNDROME: ICD-10-CM

## 2021-02-08 DIAGNOSIS — M31.30 WEGENER'S GRANULOMATOSIS: Chronic | ICD-10-CM

## 2021-02-08 RX ORDER — OXYCODONE AND ACETAMINOPHEN 7.5; 325 MG/1; MG/1
1 TABLET ORAL EVERY 4 HOURS PRN
Qty: 150 TABLET | Refills: 0 | Status: SHIPPED | OUTPATIENT
Start: 2021-02-08 | End: 2021-03-09 | Stop reason: SDUPTHER

## 2021-02-09 ENCOUNTER — OFFICE VISIT (OUTPATIENT)
Dept: FAMILY MEDICINE CLINIC | Facility: CLINIC | Age: 51
End: 2021-02-09

## 2021-02-09 VITALS
RESPIRATION RATE: 16 BRPM | WEIGHT: 160 LBS | SYSTOLIC BLOOD PRESSURE: 130 MMHG | TEMPERATURE: 98 F | HEART RATE: 84 BPM | DIASTOLIC BLOOD PRESSURE: 76 MMHG | BODY MASS INDEX: 29.44 KG/M2 | OXYGEN SATURATION: 98 % | HEIGHT: 62 IN

## 2021-02-09 DIAGNOSIS — N89.8 VAGINAL ITCHING: ICD-10-CM

## 2021-02-09 DIAGNOSIS — B37.3 VAGINAL YEAST INFECTION: Primary | ICD-10-CM

## 2021-02-09 DIAGNOSIS — R21 RASH: ICD-10-CM

## 2021-02-09 DIAGNOSIS — N95.2 VAGINAL ATROPHY: ICD-10-CM

## 2021-02-09 PROCEDURE — 99213 OFFICE O/P EST LOW 20 MIN: CPT | Performed by: PHYSICIAN ASSISTANT

## 2021-02-09 RX ORDER — FLUCONAZOLE 150 MG/1
TABLET ORAL
Qty: 3 TABLET | Refills: 0 | Status: SHIPPED | OUTPATIENT
Start: 2021-02-09 | End: 2021-02-15

## 2021-02-09 RX ORDER — DIAPER,BRIEF,INFANT-TODD,DISP
EACH MISCELLANEOUS 4 TIMES DAILY PRN
Qty: 60 G | Refills: 0 | Status: SHIPPED | OUTPATIENT
Start: 2021-02-09 | End: 2021-02-28

## 2021-02-09 NOTE — PROGRESS NOTES
Assessment/Plan:    Recurrent vaginal yeast infection/Rash  - Will prescribe Diflucan 150 mg, to be taken on day 1, day 3, and a 7   - Advised to apply ice pack as needed to the area for relief  - Will prescribe hydrocortisone cream, to be applied to the affected area to see if this will provide some relief as well  - Will place updated referral to OBGYN, so patient could schedule follow-up appointment  Diagnoses and all orders for this visit:    Vaginal yeast infection  -     fluconazole (DIFLUCAN) 150 mg tablet; Take 1 tablet (150 mg) by mouth daily on day 1, day 3, and day 7  Rash  -     hydrocortisone 1 % cream; Apply topically 4 (four) times a day as needed for irritation    Vaginal atrophy  -     Ambulatory referral to Obstetrics / Gynecology; Future    Vaginal itching  -     Ambulatory referral to Obstetrics / Gynecology; Future          All of patients questions were answered  Patient understands and agrees with the above plan  Return if symptoms worsen or fail to improve  Nazia HOLLIS Valle  02/9/21  [facility]           Subjective:     Patient ID: Lydia Sandoval  is a 48 y o  female  has a past medical history of ADHD, Anemia of chronic disease, Anxiety, Asthma, Asthma, Borderline personality disorder ([address] ), Cataplexy, Chronic abdominal pain, CKD (chronic kidney disease) stage 3, GFR 30-59 ml/min, 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[address] ), Cushing syndrome ([address] ), Diabetes mellitus ([address] ), DVT (deep venous thrombosis) ([address] ), GERD (gastroesophageal reflux disease), History of acute pancreatitis, History of transfusion, HTN (hypertension), Hypertension, Liver disease, Microscopic polyangiitis ([address] ), Morbid obesity ([address] ), MPA (microscopic polyangiitis) ([address] ), Ovarian cyst, PTSD (post-traumatic stress disorder), Renal disorder, Self-inflicted injury, and Wegener's granulomatosis with renal involvement ([address] )  She also has no past medical history of Hyperlipidemia   who presents today in office for vaginal itching      - Patient is a 48 y o  female who presents today for vaginal itching  Patient notes she suffers from a vaginal yeast infection of the time she has sexual intercourse  Patient notes she does not have sexual intercourse often  Patient notes last Friday, she had sexual intercourse and shortly after she began to experience some vaginal itching and vaginal yellow discharge  Patient notes she took 1 dose of Diflucan  Patient notes it did help for few days, but then the symptoms returned  Patient notes she then took another dose the following Friday  Patient notes now she is no longer experiencing the vaginal discharge, but the vaginal itching has been persistent  Patient notes she has been applying Monistat cream to the area with little relief  Patient notes she continues to itch the area and has now caused some bleeding and swelling  Patient notes it has now become painful due to this  Patient notes she did use lube for the first time and she thinks she may have been allergic to it  Patient is also experiencing pain during sexual intercourse which is new for her  Patient would like to follow-up with her OBGYN  The following portions of the patient's history were reviewed and updated as appropriate: allergies, current medications, past family history, past medical history, past social history, past surgical history and problem list         Review of Systems   Constitutional: Negative for chills, fatigue and fever  HENT: Negative for congestion and sore throat  Eyes: Negative for pain and visual disturbance  Respiratory: Negative for cough, chest tightness and shortness of breath  Cardiovascular: Negative for chest pain and palpitations  Gastrointestinal: Negative for abdominal pain, constipation and diarrhea  Genitourinary: Positive for dyspareunia and vaginal pain (due to irritation and scatching)   Negative for difficulty urinating, dysuria, flank pain, frequency, hematuria, pelvic pain, vaginal bleeding and vaginal discharge  Vaginal itching   Neurological: Negative for dizziness and headaches  Psychiatric/Behavioral: The patient is not nervous/anxious  Objective:   Vitals:    02/09/21 1020   BP: 130/76   BP Location: Right arm   Patient Position: Sitting   Cuff Size: Standard   Pulse: 84   Resp: 16   Temp: 98 °F (36 7 °C)   TempSrc: Temporal   SpO2: 98%   Weight: 72 6 kg (160 lb)   Height: 5' 2" (1 575 m)         Physical Exam  Vitals signs and nursing note reviewed  Constitutional:       Appearance: She is well-developed  Comments: Examined in power chair  HENT:      Head: Normocephalic and atraumatic  Right Ear: External ear normal       Left Ear: External ear normal       Nose: Nose normal       Mouth/Throat:      Pharynx: Uvula midline  Eyes:      Conjunctiva/sclera: Conjunctivae normal    Neck:      Musculoskeletal: Normal range of motion and neck supple  Cardiovascular:      Rate and Rhythm: Normal rate and regular rhythm  Heart sounds: Normal heart sounds  No murmur  Pulmonary:      Effort: Pulmonary effort is normal       Breath sounds: Normal breath sounds  No wheezing  Genitourinary:     Comments: Deferred exam   Skin:     General: Skin is warm and dry  Neurological:      Mental Status: She is alert and oriented to person, place, and time  Motor: Abnormal muscle tone present

## 2021-02-09 NOTE — PATIENT INSTRUCTIONS
Yeast Infection   WHAT YOU NEED TO KNOW:   A yeast infection, or vulvovaginal candidiasis, is a common vaginal infection  Vulvovaginal candidiasis is caused by a fungus, or yeast-like germ  Fungi are normally found in your vagina  Too many fungi can cause an infection  DISCHARGE INSTRUCTIONS:   Contact your healthcare provider if:   · You have fever and chills  · You develop abdominal or pelvic pain  · Your discharge is bloody and it is not your monthly period  · Your signs and symptoms get worse, even after treatment  · You have questions or concerns about your condition or care  Medicines:   · Medicines  help treat the fungal infection and decrease inflammation  The medicine may be a pill, cream, ointment, or vaginal tablet or suppository  · Take your medicine as directed  Contact your healthcare provider if you think your medicine is not helping or if you have side effects  Tell him of her if you are allergic to any medicine  Keep a list of the medicines, vitamins, and herbs you take  Include the amounts, and when and why you take them  Bring the list or the pill bottles to follow-up visits  Carry your medicine list with you in case of an emergency  Keep your vagina healthy:   · Do not have sex until your symptoms go away  Have your partner wear a condom until you complete your course of medication  · Always wipe from front to back  after you use the toilet  This prevents spreading bacteria from your rectal area into your vagina  · Clean around your vulva with mild soap and warm water each day  Gently dry the area after washing  Do not use hot tubs  The heat and moisture from hot tubs can increase your risk for another yeast infection  · Do not wear tight-fitting clothes or undergarments  for long periods  Wear cotton underwear during the day  Cotton helps keep your genital area dry and does not hold in warmth or moisture  Do not wear underwear at night      · Change your laundry soap or fabric softener  if you think it is irritating your skin  · Do not douche  or use feminine hygiene sprays or bubble bath  Do not use pads or tampons that are scented, or colored or perfumed toilet paper  · Ask your healthcare provider about birth control options if necessary  Condoms have latex and diaphragms have gel that kills sperm  Both of these may irritate your genital area  Follow up with your healthcare provider as directed:  Write down your questions so you remember to ask them during your visits  © Copyright [address] Information is for End User's use only and may not be sold, redistributed or otherwise used for commercial purposes  All illustrations and images included in CareNotes® are the copyrighted property of [employer]  or [facility] April Larson   The above information is an  only  It is not intended as medical advice for individual conditions or treatments  Talk to your doctor, nurse or pharmacist before following any medical regimen to see if it is safe and effective for you

## 2021-02-15 ENCOUNTER — OFFICE VISIT (OUTPATIENT)
Dept: FAMILY MEDICINE CLINIC | Facility: CLINIC | Age: 51
End: 2021-02-15

## 2021-02-15 VITALS
WEIGHT: 161 LBS | HEART RATE: 80 BPM | OXYGEN SATURATION: 97 % | HEIGHT: 62 IN | TEMPERATURE: 97.6 F | RESPIRATION RATE: 18 BRPM | DIASTOLIC BLOOD PRESSURE: 78 MMHG | BODY MASS INDEX: 29.63 KG/M2 | SYSTOLIC BLOOD PRESSURE: 120 MMHG

## 2021-02-15 VITALS
HEART RATE: 96 BPM | SYSTOLIC BLOOD PRESSURE: 122 MMHG | OXYGEN SATURATION: 97 % | HEIGHT: 62 IN | BODY MASS INDEX: 29.63 KG/M2 | RESPIRATION RATE: 20 BRPM | WEIGHT: 161 LBS | DIASTOLIC BLOOD PRESSURE: 88 MMHG | TEMPERATURE: 97.6 F

## 2021-02-15 DIAGNOSIS — E11.9 TYPE 2 DIABETES MELLITUS WITHOUT COMPLICATION, WITH LONG-TERM CURRENT USE OF INSULIN (HCC): Primary | ICD-10-CM

## 2021-02-15 DIAGNOSIS — B37.3 VAGINAL YEAST INFECTION: ICD-10-CM

## 2021-02-15 DIAGNOSIS — G62.9 SMALL FIBER NEUROPATHY: ICD-10-CM

## 2021-02-15 DIAGNOSIS — B07.0 PLANTAR WART, RIGHT FOOT: ICD-10-CM

## 2021-02-15 DIAGNOSIS — R29.898 WEAKNESS OF BOTH LOWER EXTREMITIES: ICD-10-CM

## 2021-02-15 DIAGNOSIS — K21.9 GASTROESOPHAGEAL REFLUX DISEASE, UNSPECIFIED WHETHER ESOPHAGITIS PRESENT: ICD-10-CM

## 2021-02-15 DIAGNOSIS — E11.22 TYPE 2 DIABETES MELLITUS WITH STAGE 4 CHRONIC KIDNEY DISEASE, UNSPECIFIED WHETHER LONG TERM INSULIN USE (HCC): ICD-10-CM

## 2021-02-15 DIAGNOSIS — E78.5 DYSLIPIDEMIA: Chronic | ICD-10-CM

## 2021-02-15 DIAGNOSIS — G62.9 NEUROPATHY: ICD-10-CM

## 2021-02-15 DIAGNOSIS — G89.4 CHRONIC PAIN SYNDROME: ICD-10-CM

## 2021-02-15 DIAGNOSIS — G47.411 CATAPLEXY: ICD-10-CM

## 2021-02-15 DIAGNOSIS — N18.4 TYPE 2 DIABETES MELLITUS WITH STAGE 4 CHRONIC KIDNEY DISEASE, UNSPECIFIED WHETHER LONG TERM INSULIN USE (HCC): ICD-10-CM

## 2021-02-15 DIAGNOSIS — Z79.4 TYPE 2 DIABETES MELLITUS WITHOUT COMPLICATION, WITH LONG-TERM CURRENT USE OF INSULIN (HCC): Primary | ICD-10-CM

## 2021-02-15 DIAGNOSIS — N18.4 CKD (CHRONIC KIDNEY DISEASE) STAGE 4, GFR 15-29 ML/MIN (HCC): ICD-10-CM

## 2021-02-15 DIAGNOSIS — M31.7 MPA (MICROSCOPIC POLYANGIITIS) (HCC): ICD-10-CM

## 2021-02-15 DIAGNOSIS — M31.31 GRANULOMATOSIS WITH POLYANGIITIS WITH RENAL INVOLVEMENT (HCC): Chronic | ICD-10-CM

## 2021-02-15 DIAGNOSIS — K31.84 GASTROPARESIS: ICD-10-CM

## 2021-02-15 LAB — SL AMB POCT HEMOGLOBIN AIC: 6 (ref ?–6.5)

## 2021-02-15 PROCEDURE — 17110 DESTRUCTION B9 LES UP TO 14: CPT | Performed by: PODIATRIST

## 2021-02-15 PROCEDURE — 3044F HG A1C LEVEL LT 7.0%: CPT | Performed by: PHYSICIAN ASSISTANT

## 2021-02-15 PROCEDURE — 83036 HEMOGLOBIN GLYCOSYLATED A1C: CPT | Performed by: PHYSICIAN ASSISTANT

## 2021-02-15 PROCEDURE — 99213 OFFICE O/P EST LOW 20 MIN: CPT | Performed by: PODIATRIST

## 2021-02-15 PROCEDURE — 99214 OFFICE O/P EST MOD 30 MIN: CPT | Performed by: PHYSICIAN ASSISTANT

## 2021-02-15 NOTE — PROGRESS NOTES
Podiatry Clinic Visit  Karmen Anderson 48 y o  female MRN: [id number]  Encounter: [id number]    Assessment/Plan        Diagnoses and all orders for this visit:    Neuropathy  -     Ambulatory referral to Podiatry    Plantar wart, right foot  -     Ambulatory referral to Podiatry         Plan:   Patient was seen and examined with all their questions and concerns addressed   Pt was educated on diabetic foot care and importance of glycemic control   Plantar wart was trimmed with out an incident  No cryotherapy available in clinic  Pt was instructed to continue wart treatment with Dr Alejandra Wu   Patient was re-appointed for 6 months for diabetic foot care      - Dr Yary Montero was available/present for entirety of patient encounter and present for all procedures  History of Present Illness     HPI: Karmen Anderson is a 48 y o  female who presents complaining of right heel wart  Pt states that she normally see Dr Alejandra Wu for her warts  Pt reports mild pain to plantar warts  Pt states that she was instructed not to use salicylic acid to her plantar wart  Pt states that the main reason why she is here today is to see her PCP  Pt states that because she was coming to see her PCP " might as well see the foot doctor regarding her wart"  Pt reports neuropathy to lower extremity  Last A1c 5 8 (9/24/20)The patient has no further podiatric complaints at this time  Review of Systems   Constitutional: Negative  HENT: Negative  Eyes: Negative  Respiratory: Negative  Cardiovascular: Negative  Gastrointestinal: Negative  Musculoskeletal: decreased strength   Skin: right foot wart   Neurological: Negative          Historical Information   Past Medical History:   Diagnosis Date    ADHD     Anemia of chronic disease     Anxiety     Asthma     Asthma     Borderline personality disorder (HCC)     Cataplexy     Chronic abdominal pain     CKD (chronic kidney disease) stage 3, GFR 30-59 ml/min     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[place] [address] )     Cushing syndrome ([place] [address] )     Diabetes mellitus ([place] [address] )     DVT (deep venous thrombosis) (HCC)     GERD (gastroesophageal reflux disease)     History of acute pancreatitis     felt secondary to Bactrim    History of transfusion     HTN (hypertension)     Hypertension     Liver disease     fatty liver    Microscopic polyangiitis (HCC)     Morbid obesity (HCC)     MPA (microscopic polyangiitis) (HCC)     Ovarian cyst     PTSD (post-traumatic stress disorder)     Renal disorder     Self-inflicted injury     self inflicted skin wounds    Wegener's granulomatosis with renal involvement ([place] [address] ) 2015     Past Surgical History:   Procedure Laterality Date    ESOPHAGOGASTRODUODENOSCOPY  09/11/2015    mild antral gastritis    GASTRIC STIMULATOR IMPLANT SURGERY  06/25/2020    NC COLONOSCOPY FLX DX W/COLLJ SPEC WHEN PFRMD N/A 12/14/2018    Procedure: COLONOSCOPY with polypectomy;  Surgeon: Mckenna Gutierrez MD;  Location[facility]; Service: Gastroenterology    NC ESOPHAGOGASTRODUODENOSCOPY TRANSORAL DIAGNOSTIC N/A 12/14/2018    Procedure: ESOPHAGOGASTRODUODENOSCOPY (EGD) with biopsy;  Surgeon: Mckenna Gutierrez MD;  Location[facility];   Service: Gastroenterology    RELEASE SCAR CONTRACTURE / GRAFT REPAIRS OF HAND Bilateral      Social History   Social History     Substance and Sexual Activity   Alcohol Use No    Frequency: Never     Social History     Substance and Sexual Activity   Drug Use Yes    Types: Marijuana    Comment: marijuana daily     Social History     Tobacco Use   Smoking Status Former Smoker    Quit date: 2011    Years since quitting: 10 1   Smokeless Tobacco Never Used   Tobacco Comment    marijuana     Family History:   Family History   Problem Relation Age of Onset    No Known Problems Mother     No Known Problems Father     Colon cancer Neg Hx     Drug abuse Neg Hx         mother father    Mental illness Neg Hx         disorder, mother father    Cancer Neg Hx     Breast cancer Neg Hx        Meds/Allergies   (Not in a hospital admission)    Allergies   Allergen Reactions    Prozac [Fluoxetine Hcl]      SI    Bactrim [Sulfamethoxazole-Trimethoprim]      Pt "They think that is what cause the pancreatitis"     Lamictal [Lamotrigine] GI Intolerance    Lithium Other (See Comments)    Amlodipine Hives    Haldol [Haloperidol] Other (See Comments)     "I don't like it"    Ibuprofen     Lexapro [Escitalopram Oxalate] Rash    Navane [Thiothixene]      SI    Other      "novaine?" antipsychotic       Objective     Current Vitals:   Blood Pressure: 120/78 (02/15/21 0822)  Pulse: 80 (02/15/21 0822)  Temperature: 97 6 °F (36 4 °C) (02/15/21 0822)  Temp Source: Temporal (02/15/21 3016)  Respirations: 18 (02/15/21 0822)  Height: 5' 2" (157 5 cm) (02/15/21 7211)  Weight - Scale: 73 kg (161 lb) (02/15/21 0822)  SpO2: 97 % (02/15/21 0822)        /78 (BP Location: Right arm, Patient Position: Sitting, Cuff Size: Standard)   Pulse 80   Temp 97 6 °F (36 4 °C) (Temporal)   Resp 18   Ht 5' 2" (1 575 m)   Wt 73 kg (161 lb)   LMP  (LMP Unknown)   SpO2 97%   BMI 29 45 kg/m²       Lower Extremity Exam:    Foot Exam    Musculoskeletal:  MMT is decreased to all compartments of the LE L>R, +0/4 edema B/L, Hallux limitus is not present  Equinus is present  No pain with passive ROM of pedal joints  Mild pain on palpation of plantar right heel wart  Vascular:   DP pulses and PT pulses are present bilaterally  , CFT< 3sec to all digits  Pedal hair is Present  Pulse has regular rate and rhythm  Skin temperature warm to warm B/L  Dermatological:  No open Lesions  Skin of the LE is normal texture, temperature, turgor  Interdigital maceration is not present  Hyperkeratotic lesion noted to right lateral plantar heel with disruption of skin lines and pinpoint bleeding noted consistent with plantar wart  Neurologic:  Gross sensation is intact   Protective sensation is Diminished L>R  Sharp sensation is present

## 2021-02-15 NOTE — PROGRESS NOTES
Assessment/Plan:    Gastroparesis/GERD  - Status post laparoscopic pyloroplasty with gastric stimulator placement on 06/22/2020 with [address] taking Protonix 40 mg twice daily, Bentyl, and carafate     - Advised to follow-up with gastroenterology as scheduled for later this week  Marcelo  to follow-up with [facility] as scheduled  Recurrent vaginal yeast infection/Rash  - Hydrocortisone cream has helped to relieve itching and swelling, but patient continues to experience vaginal discharge  - Advised to follow-up with OBGYN for further evaluation  Type 2 diabetes mellitus  - POCT HgbA1c today in office is 6 0  this has increased from 5 8 in September 2020     - Continue NovoLog 6 units, 3 times daily with meals and Toujeo 22 units once daily  Weakness in lower extremities/cataplexy/Wegener's granulomatosis/small fiber neuropathy  - Patient would benefit in restarting physical therapy  Patient has difficulty leaving her house due to being wheelchair bound  - Will [facility] referral for [address] physical therapy  - Advised to follow-up with Neurology as scheduled in April 2021  Diagnoses and all orders for this visit:    Type 2 diabetes mellitus without complication, with long-term current use of insulin ([facility])  -     POCT hemoglobin A1c  -     CBC and differential; Future  -     Comprehensive metabolic panel; Future    Dyslipidemia  -     Lipid panel; Future    Weakness of both lower extremities  -     Ambulatory Referral to Home Health; Future    Small fiber neuropathy  -     Ambulatory Referral to Home Health; Future    Chronic pain syndrome  -     Ambulatory Referral to [address] [place] [address] Kevyn Garcia;  Future    MPA (microscopic polyangiitis) ([facility])    Type 2 diabetes mellitus with stage 4 chronic kidney disease, unspecified whether long term insulin use ([facility])    CKD (chronic kidney disease) stage 4, GFR 15-29 ml/min ([facility])    Gastroparesis    Gastroesophageal reflux disease, unspecified whether esophagitis present    Vaginal yeast infection    Cataplexy    Granulomatosis with polyangiitis with renal involvement ([facility] [address] )          All of patients questions were answered  Patient understands and agrees with the above plan  Return for Next scheduled follow up as scheduled on 3/29/2021  Nazia Valle PA-C  02/15/21  [facility]           Subjective:     Patient ID: Lydia Sandoval  is a 48 y o  female  has a past medical history of ADHD, Anemia of chronic disease, Anxiety, Asthma, Asthma, Borderline personality disorder ([address] ), Cataplexy, Chronic abdominal pain, CKD (chronic kidney disease) stage 3, GFR 30-59 ml/min, 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[address] ), Cushing syndrome ([address] ), Diabetes mellitus ([address] ), DVT (deep venous thrombosis) ([address] ), GERD (gastroesophageal reflux disease), History of acute pancreatitis, History of transfusion, HTN (hypertension), Hypertension, Liver disease, Microscopic polyangiitis ([address] ), Morbid obesity ([address] ), MPA (microscopic polyangiitis) ([address] ), Ovarian cyst, PTSD (post-traumatic stress disorder), Renal disorder, Self-inflicted injury, and Wegener's granulomatosis with renal involvement ([address] )  She also has no past medical history of Hyperlipidemia  who presents today in office for   Abdominal pain  - Patient is a 48 y o  female who presents today for  Abdominal pain  Patient notes she continues to experience abdominal pain  And gastric reflux  Patient is currently taking Protonix 40 mg twice daily, Bentyl, and Carafate  Patient does have a visit scheduled with Gastroenterology for later this week  - Patient notes about 1-2 years ago she was receiving home physical therapy and occupational therapy to help with strengthening  Patient notes she feels like she would benefit from this again  Patient notes he continues to become more stiff every day which causes more pain    Patient uses a wheelchair for ambulation and it is difficult for patient to leave her house     - Patient was last seen in office for same-day visit on 02/09/2021 due to recurrent vaginal yeast infection  Patient was prescribed Diflucan 150 mg, to be taken on day 1, day 3, and a 7  Patient was also prescribed hydrocortisone cream due to redness, itching, and swelling of the vaginal area  Today, patient notes the hydrocortisone cream has been effective in decreasing the itching and swelling  However, patient notes she continues to have vaginal discharge  Patient notes it is a whitish color and does not smell  Patient notes the vaginal discharge does cause increased itchiness though  Patient notes she usually is able to fight off a yeast infection very easily  The following portions of the patient's history were reviewed and updated as appropriate: allergies, current medications, past family history, past medical history, past social history, past surgical history and problem list         Review of Systems   Constitutional: Negative for chills, fatigue and fever  HENT: Negative for congestion and sore throat  Eyes: Negative for pain and visual disturbance  Respiratory: Negative for cough, chest tightness and shortness of breath  Cardiovascular: Negative for chest pain and palpitations  Gastrointestinal: Positive for abdominal pain  Negative for constipation and diarrhea  Genitourinary: Positive for dyspareunia, vaginal discharge and vaginal pain (due to irritation and scatching)  Negative for difficulty urinating, dysuria, flank pain, frequency, hematuria, pelvic pain and vaginal bleeding  Vaginal itching   Neurological: Negative for dizziness and headaches  Psychiatric/Behavioral: The patient is not nervous/anxious                    Objective:   Vitals:    02/15/21 1138   BP: 122/88   BP Location: Right arm   Patient Position: Sitting   Cuff Size: Standard   Pulse: 96   Resp: 20   Temp: 97 6 °F (36 4 °C)   TempSrc: Temporal   SpO2: 97%   Weight: 73 kg (161 lb) Height: 5' 2" (1 575 m)         Physical Exam  Vitals signs and nursing note reviewed  Constitutional:       Appearance: She is well-developed  Comments: Examined in power chair  HENT:      Head: Normocephalic and atraumatic  Right Ear: External ear normal       Left Ear: External ear normal       Nose: Nose normal    Eyes:      Conjunctiva/sclera: Conjunctivae normal    Neck:      Musculoskeletal: Normal range of motion and neck supple  Cardiovascular:      Rate and Rhythm: Normal rate and regular rhythm  Heart sounds: Normal heart sounds  No murmur  Pulmonary:      Effort: Pulmonary effort is normal       Breath sounds: Normal breath sounds  No wheezing  Genitourinary:     Comments: Deferred exam   Skin:     General: Skin is warm and dry  Neurological:      Mental Status: She is alert and oriented to person, place, and time  Motor: Abnormal muscle tone present

## 2021-02-17 ENCOUNTER — APPOINTMENT (OUTPATIENT)
Dept: LAB | Facility: CLINIC | Age: 51
End: 2021-02-17
Payer: COMMERCIAL

## 2021-02-17 DIAGNOSIS — Z79.4 TYPE 2 DIABETES MELLITUS WITHOUT COMPLICATION, WITH LONG-TERM CURRENT USE OF INSULIN (HCC): ICD-10-CM

## 2021-02-17 DIAGNOSIS — E11.9 TYPE 2 DIABETES MELLITUS WITHOUT COMPLICATION, WITH LONG-TERM CURRENT USE OF INSULIN (HCC): ICD-10-CM

## 2021-02-17 DIAGNOSIS — E78.5 DYSLIPIDEMIA: Chronic | ICD-10-CM

## 2021-02-17 LAB
ALBUMIN SERPL BCP-MCNC: 3.7 G/DL (ref 3.5–5)
ALP SERPL-CCNC: 164 U/L (ref 46–116)
ALT SERPL W P-5'-P-CCNC: 21 U/L (ref 12–78)
ANION GAP SERPL CALCULATED.3IONS-SCNC: 6 MMOL/L (ref 4–13)
AST SERPL W P-5'-P-CCNC: 15 U/L (ref 5–45)
BASOPHILS # BLD AUTO: 0.03 THOUSANDS/ΜL (ref 0–0.1)
BASOPHILS NFR BLD AUTO: 1 % (ref 0–1)
BILIRUB SERPL-MCNC: 0.31 MG/DL (ref 0.2–1)
BUN SERPL-MCNC: 41 MG/DL (ref 5–25)
CALCIUM SERPL-MCNC: 9.8 MG/DL (ref 8.3–10.1)
CHLORIDE SERPL-SCNC: 112 MMOL/L (ref 100–108)
CHOLEST SERPL-MCNC: 285 MG/DL (ref 50–200)
CO2 SERPL-SCNC: 26 MMOL/L (ref 21–32)
CREAT SERPL-MCNC: 2.13 MG/DL (ref 0.6–1.3)
EOSINOPHIL # BLD AUTO: 0.04 THOUSAND/ΜL (ref 0–0.61)
EOSINOPHIL NFR BLD AUTO: 1 % (ref 0–6)
ERYTHROCYTE [DISTWIDTH] IN BLOOD BY AUTOMATED COUNT: 13.1 % (ref 11.6–15.1)
GFR SERPL CREATININE-BSD FRML MDRD: 26 ML/MIN/1.73SQ M
GLUCOSE P FAST SERPL-MCNC: 99 MG/DL (ref 65–99)
HCT VFR BLD AUTO: 44.4 % (ref 34.8–46.1)
HDLC SERPL-MCNC: 41 MG/DL
HGB BLD-MCNC: 13.9 G/DL (ref 11.5–15.4)
IMM GRANULOCYTES # BLD AUTO: 0.02 THOUSAND/UL (ref 0–0.2)
IMM GRANULOCYTES NFR BLD AUTO: 0 % (ref 0–2)
LYMPHOCYTES # BLD AUTO: 1.6 THOUSANDS/ΜL (ref 0.6–4.47)
LYMPHOCYTES NFR BLD AUTO: 27 % (ref 14–44)
MCH RBC QN AUTO: 30 PG (ref 26.8–34.3)
MCHC RBC AUTO-ENTMCNC: 31.3 G/DL (ref 31.4–37.4)
MCV RBC AUTO: 96 FL (ref 82–98)
MONOCYTES # BLD AUTO: 0.41 THOUSAND/ΜL (ref 0.17–1.22)
MONOCYTES NFR BLD AUTO: 7 % (ref 4–12)
NEUTROPHILS # BLD AUTO: 3.91 THOUSANDS/ΜL (ref 1.85–7.62)
NEUTS SEG NFR BLD AUTO: 64 % (ref 43–75)
NONHDLC SERPL-MCNC: 244 MG/DL
NRBC BLD AUTO-RTO: 0 /100 WBCS
PLATELET # BLD AUTO: 296 THOUSANDS/UL (ref 149–390)
PMV BLD AUTO: 10.5 FL (ref 8.9–12.7)
POTASSIUM SERPL-SCNC: 4.6 MMOL/L (ref 3.5–5.3)
PROT SERPL-MCNC: 8.3 G/DL (ref 6.4–8.2)
RBC # BLD AUTO: 4.63 MILLION/UL (ref 3.81–5.12)
SODIUM SERPL-SCNC: 144 MMOL/L (ref 136–145)
TRIGL SERPL-MCNC: 552 MG/DL
WBC # BLD AUTO: 6.01 THOUSAND/UL (ref 4.31–10.16)

## 2021-02-17 PROCEDURE — 80061 LIPID PANEL: CPT

## 2021-02-17 PROCEDURE — 85025 COMPLETE CBC W/AUTO DIFF WBC: CPT

## 2021-02-17 PROCEDURE — 36415 COLL VENOUS BLD VENIPUNCTURE: CPT

## 2021-02-17 PROCEDURE — 80053 COMPREHEN METABOLIC PANEL: CPT

## 2021-02-18 ENCOUNTER — HOSPITAL ENCOUNTER (EMERGENCY)
Facility: HOSPITAL | Age: 51
Discharge: HOME | End: 2021-02-19
Attending: EMERGENCY MEDICINE | Admitting: EMERGENCY MEDICINE
Payer: COMMERCIAL

## 2021-02-18 ENCOUNTER — OFFICE VISIT (OUTPATIENT)
Dept: GASTROENTEROLOGY | Facility: MEDICAL CENTER | Age: 51
End: 2021-02-18
Payer: COMMERCIAL

## 2021-02-18 ENCOUNTER — APPOINTMENT (OUTPATIENT)
Dept: LAB | Facility: CLINIC | Age: 51
End: 2021-02-18
Payer: COMMERCIAL

## 2021-02-18 VITALS
RESPIRATION RATE: 20 BRPM | TEMPERATURE: 98.2 F | HEART RATE: 96 BPM | OXYGEN SATURATION: 99 % | DIASTOLIC BLOOD PRESSURE: 84 MMHG | SYSTOLIC BLOOD PRESSURE: 135 MMHG

## 2021-02-18 VITALS
WEIGHT: 157 LBS | TEMPERATURE: 97 F | DIASTOLIC BLOOD PRESSURE: 62 MMHG | BODY MASS INDEX: 28.89 KG/M2 | HEART RATE: 86 BPM | HEIGHT: 62 IN | SYSTOLIC BLOOD PRESSURE: 118 MMHG

## 2021-02-18 DIAGNOSIS — R74.8 ELEVATED ALKALINE PHOSPHATASE LEVEL: ICD-10-CM

## 2021-02-18 DIAGNOSIS — R11.0 NAUSEA: ICD-10-CM

## 2021-02-18 DIAGNOSIS — K58.9 IRRITABLE BOWEL SYNDROME, UNSPECIFIED TYPE: ICD-10-CM

## 2021-02-18 DIAGNOSIS — N39.0 UTI (URINARY TRACT INFECTION): ICD-10-CM

## 2021-02-18 DIAGNOSIS — R10.9 LEFT FLANK DISCOMFORT: ICD-10-CM

## 2021-02-18 DIAGNOSIS — N12 PYELONEPHRITIS: ICD-10-CM

## 2021-02-18 DIAGNOSIS — K59.00 CONSTIPATION, UNSPECIFIED CONSTIPATION TYPE: Primary | ICD-10-CM

## 2021-02-18 DIAGNOSIS — K21.9 GASTROESOPHAGEAL REFLUX DISEASE, UNSPECIFIED WHETHER ESOPHAGITIS PRESENT: ICD-10-CM

## 2021-02-18 DIAGNOSIS — R53.83 FATIGUE: Primary | ICD-10-CM

## 2021-02-18 DIAGNOSIS — K21.9 GERD (GASTROESOPHAGEAL REFLUX DISEASE): ICD-10-CM

## 2021-02-18 DIAGNOSIS — K31.84 GASTROPARESIS: ICD-10-CM

## 2021-02-18 DIAGNOSIS — R10.13 EPIGASTRIC DISCOMFORT: ICD-10-CM

## 2021-02-18 LAB
BACTERIA UR QL AUTO: ABNORMAL /HPF
BILIRUB UR QL STRIP: NEGATIVE
CLARITY UR: ABNORMAL
COLOR UR: YELLOW
FLUAV RNA RESP QL NAA+PROBE: NEGATIVE
FLUBV RNA RESP QL NAA+PROBE: NEGATIVE
GGT SERPL-CCNC: 35 U/L (ref 5–85)
GLUCOSE UR STRIP-MCNC: NEGATIVE MG/DL
HGB UR QL STRIP.AUTO: ABNORMAL
HYALINE CASTS #/AREA URNS LPF: ABNORMAL /LPF
KETONES UR STRIP-MCNC: NEGATIVE MG/DL
LEUKOCYTE ESTERASE UR QL STRIP: ABNORMAL
LIPASE SERPL-CCNC: 242 U/L (ref 73–393)
NITRITE UR QL STRIP: NEGATIVE
NON-SQ EPI CELLS URNS QL MICRO: ABNORMAL /HPF
PH UR STRIP.AUTO: 5.5 [PH] (ref 4.5–8)
PROT UR STRIP-MCNC: ABNORMAL MG/DL
RBC #/AREA URNS AUTO: ABNORMAL /HPF
RSV RNA RESP QL NAA+PROBE: NEGATIVE
SARS-COV-2 RNA RESP QL NAA+PROBE: NEGATIVE
SP GR UR STRIP.AUTO: >=1.03 (ref 1–1.03)
TSH SERPL DL<=0.05 MIU/L-ACNC: 1.2 UIU/ML (ref 0.36–3.74)
UROBILINOGEN UR QL STRIP.AUTO: 0.2 E.U./DL
WBC #/AREA URNS AUTO: ABNORMAL /HPF

## 2021-02-18 PROCEDURE — 96361 HYDRATE IV INFUSION ADD-ON: CPT

## 2021-02-18 PROCEDURE — 87086 URINE CULTURE/COLONY COUNT: CPT

## 2021-02-18 PROCEDURE — 36415 COLL VENOUS BLD VENIPUNCTURE: CPT

## 2021-02-18 PROCEDURE — 96374 THER/PROPH/DIAG INJ IV PUSH: CPT

## 2021-02-18 PROCEDURE — 99214 OFFICE O/P EST MOD 30 MIN: CPT | Performed by: PHYSICIAN ASSISTANT

## 2021-02-18 PROCEDURE — 83690 ASSAY OF LIPASE: CPT | Performed by: FAMILY MEDICINE

## 2021-02-18 PROCEDURE — 86308 HETEROPHILE ANTIBODY SCREEN: CPT | Performed by: FAMILY MEDICINE

## 2021-02-18 PROCEDURE — 86618 LYME DISEASE ANTIBODY: CPT | Performed by: FAMILY MEDICINE

## 2021-02-18 PROCEDURE — 99284 EMERGENCY DEPT VISIT MOD MDM: CPT | Performed by: EMERGENCY MEDICINE

## 2021-02-18 PROCEDURE — 1036F TOBACCO NON-USER: CPT | Performed by: PHYSICIAN ASSISTANT

## 2021-02-18 PROCEDURE — 82977 ASSAY OF GGT: CPT

## 2021-02-18 PROCEDURE — 99283 EMERGENCY DEPT VISIT LOW MDM: CPT

## 2021-02-18 PROCEDURE — 0241U HB NFCT DS VIR RESP RNA 4 TRGT: CPT | Performed by: FAMILY MEDICINE

## 2021-02-18 PROCEDURE — 84443 ASSAY THYROID STIM HORMONE: CPT | Performed by: FAMILY MEDICINE

## 2021-02-18 PROCEDURE — 81001 URINALYSIS AUTO W/SCOPE: CPT

## 2021-02-18 RX ORDER — LINACLOTIDE 72 UG/1
72 CAPSULE, GELATIN COATED ORAL DAILY
Qty: 90 CAPSULE | Refills: 3 | Status: SHIPPED | OUTPATIENT
Start: 2021-02-18 | End: 2022-02-15

## 2021-02-18 RX ORDER — LIDOCAINE 50 MG/G
2 PATCH TOPICAL ONCE
Status: DISCONTINUED | OUTPATIENT
Start: 2021-02-18 | End: 2021-02-19 | Stop reason: HOSPADM

## 2021-02-18 RX ORDER — ACETAMINOPHEN 325 MG/1
975 TABLET ORAL ONCE
Status: DISCONTINUED | OUTPATIENT
Start: 2021-02-18 | End: 2021-02-19 | Stop reason: HOSPADM

## 2021-02-18 RX ORDER — ONDANSETRON 2 MG/ML
4 INJECTION INTRAMUSCULAR; INTRAVENOUS ONCE
Status: COMPLETED | OUTPATIENT
Start: 2021-02-18 | End: 2021-02-18

## 2021-02-18 RX ORDER — CIPROFLOXACIN 500 MG/1
500 TABLET, FILM COATED ORAL 2 TIMES DAILY
Qty: 14 TABLET | Refills: 0 | Status: SHIPPED | OUTPATIENT
Start: 2021-02-18 | End: 2021-02-25

## 2021-02-18 RX ORDER — CIPROFLOXACIN 500 MG/1
500 TABLET, FILM COATED ORAL ONCE
Status: COMPLETED | OUTPATIENT
Start: 2021-02-18 | End: 2021-02-18

## 2021-02-18 RX ADMIN — LIDOCAINE 2 PATCH: 50 PATCH TOPICAL at 22:29

## 2021-02-18 RX ADMIN — ONDANSETRON 4 MG: 2 INJECTION INTRAMUSCULAR; INTRAVENOUS at 22:31

## 2021-02-18 RX ADMIN — CIPROFLOXACIN HYDROCHLORIDE 500 MG: 500 TABLET, FILM COATED ORAL at 23:39

## 2021-02-18 RX ADMIN — SODIUM CHLORIDE 1000 ML: 0.9 INJECTION, SOLUTION INTRAVENOUS at 22:20

## 2021-02-18 NOTE — PROGRESS NOTES
[facility] - Outpatient Follow-up Note  Edy Ramirez 48 y o  female MRN: [id number]  Encounter: [id number]          ASSESSMENT AND PLAN:      1  Gastroesophageal reflux disease, unspecified whether esophagitis present: she admits to loss of her enamel and feeling of acid in her throat but denies heartburn  Will try switching her protonix to dexilant 60mg daily and recommend she see ENT and dentist given her enamel loss is likely from years of vomiting   - Ambulatory referral to Gastroenterology  - dexlansoprazole (Dexilant) 60 MG capsule; Take 1 capsule (60 mg total) by mouth daily  Dispense: 30 capsule; Refill: 3    2  Gastroparesis: she had a gastric pacemaker placed by dr Sree Castro in June/2020 and her nausea/vomiting has improved   - Ambulatory referral to Gastroenterology    3  Constipation, unspecified constipation type: she admits to having a hard time straining with hard stools and having hemorhoids  She is only using colace and cannot use miralax  Will try linzess 72 mcg daily, can increase if needed  - linaCLOtide (Linzess) 72 MCG CAPS; Take 72 mcg by mouth daily  Dispense: 90 capsule; Refill: 3    4  Elevated alkaline phosphatase level: elevated alk phos of 164, will check a GGT at this time  - Gamma GT; Future    F/u 3 months     ______________________________________________________________________    SUBJECTIVE:  Edy Ramirez with pmh pancreatitis, GERD, IBS, DM II, wegener's granulomatosis here for follow up of multiple GI complaints  She has a hx of gastroparesis and was seen at [facility] and underwent placement of gastric pacemaker by dr Sree Castro in 6/2020  She states that her nausea and vomiting has improved since this time  Her main complaints today are acid reflux/acid taste in her mouth, the loss of the enamel on her teeth and constipation  She is taking protonix 40mg bid and colace daily  She is unable to take miralax         REVIEW OF SYSTEMS IS OTHERWISE NEGATIVE  Historical Information   Past Medical History:   Diagnosis Date    ADHD     Anemia of chronic disease     Anxiety     Asthma     Asthma     Borderline personality disorder ([place] [address] )     Cataplexy     Chronic abdominal pain     CKD (chronic kidney disease) stage 3, GFR 30-59 ml/min     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[place] [address] )     Cushing syndrome ([facility] )     Diabetes mellitus ([place] [address] )     DVT (deep venous thrombosis) (HCC)     GERD (gastroesophageal reflux disease)     History of acute pancreatitis     felt secondary to Bactrim    History of transfusion     HTN (hypertension)     Hypertension     Liver disease     fatty liver    Microscopic polyangiitis (HCC)     Morbid obesity (HCC)     MPA (microscopic polyangiitis) (HCC)     Ovarian cyst     PTSD (post-traumatic stress disorder)     Renal disorder     Self-inflicted injury     self inflicted skin wounds    Wegener's granulomatosis with renal involvement ([place] [address] ) 2015     Past Surgical History:   Procedure Laterality Date    COLONOSCOPY      ESOPHAGOGASTRODUODENOSCOPY  09/11/2015    mild antral gastritis    GASTRIC STIMULATOR IMPLANT SURGERY  06/25/2020    NE COLONOSCOPY FLX DX W/COLLJ SPEC WHEN PFRMD N/A 12/14/2018    Procedure: COLONOSCOPY with polypectomy;  Surgeon: Andrea Teran MD;  [facility]; Service: Gastroenterology    NE ESOPHAGOGASTRODUODENOSCOPY TRANSORAL DIAGNOSTIC N/A 12/14/2018    Procedure: ESOPHAGOGASTRODUODENOSCOPY (EGD) with biopsy;  Surgeon: Andrea Teran MD;  Location: [facility];   Service: Gastroenterology    RELEASE SCAR CONTRACTURE / GRAFT REPAIRS OF HAND Bilateral     UPPER GASTROINTESTINAL ENDOSCOPY  12/26/2019     Social History   Social History     Substance and Sexual Activity   Alcohol Use No    Frequency: Never     Social History     Substance and Sexual Activity   Drug Use Yes    Types: Marijuana    Comment: marijuana daily     Social History     Tobacco Use   Smoking Status Former Smoker    Quit date: 2011    Years since quitting: 10 1   Smokeless Tobacco Never Used   Tobacco Comment    marijuana     Family History   Problem Relation Age of Onset    No Known Problems Mother     No Known Problems Father     Colon cancer Neg Hx     Drug abuse Neg Hx         mother father    Mental illness Neg Hx         disorder, mother father    Cancer Neg Hx     Breast cancer Neg Hx        Meds/Allergies       Current Outpatient Medications:     albuterol (2 5 mg/3 mL) 0 083 % nebulizer solution    albuterol (PROVENTIL HFA,VENTOLIN HFA) 90 mcg/act inhaler    Alcohol Swabs (ALCOHOL PADS) 70 % PADS    amLODIPine (NORVASC) 5 mg tablet    amphetamine-dextroamphetamine (ADDERALL XR) 20 MG 24 hr capsule    Blood Glucose Monitoring Suppl (FREESTYLE LITE) DEIRDRE    buPROPion (WELLBUTRIN XL) 150 mg 24 hr tablet    buPROPion (WELLBUTRIN XL) 300 mg 24 hr tablet    conjugated estrogens (Premarin) vaginal cream    cycloSPORINE (RESTASIS) 0 05 % ophthalmic emulsion    dicyclomine (BENTYL) 10 mg capsule    docusate sodium (COLACE) 100 mg capsule    Easy Comfort Lancets MISC    FREESTYLE LITE test strip    hydrocortisone 1 % cream    insulin aspart (NovoLOG FlexPen) 100 UNIT/ML injection pen    insulin glargine (Toujeo Max SoloStar) 300 units/mL CONCETRATED U-300 injection pen (2-unit dial)    Insulin Pen Needle (PEN NEEDLES) 31G X 8 MM MISC    lidocaine (XYLOCAINE) 5 % ointment    montelukast (SINGULAIR) 10 mg tablet    Movantik 25 MG tablet    Nutritional Supplements (Ensure Original) LIQD    ondansetron (ZOFRAN) 4 mg tablet    oxyCODONE-acetaminophen (PERCOCET) 7 5-325 MG per tablet    pantoprazole (PROTONIX) 40 mg tablet    polyethylene glycol (GLYCOLAX) 17 GM/SCOOP powder    Probiotic Product (PROBIOTIC-10 PO)    QUEtiapine (SEROquel) 200 mg tablet    scopolamine (TRANSDERM-SCOP) 1 5 mg/3 days TD 72 hr patch    sucralfate (CARAFATE) 1 g/10 mL suspension    TiZANidine (ZANAFLEX) 4 MG capsule   dexlansoprazole (Dexilant) 60 MG capsule    linaCLOtide (Linzess) 72 MCG CAPS    Allergies   Allergen Reactions    Prozac [Fluoxetine Hcl]      SI    Bactrim [Sulfamethoxazole-Trimethoprim]      Pt "They think that is what cause the pancreatitis"     Lamictal [Lamotrigine] GI Intolerance    Lithium Other (See Comments)    Amlodipine Hives    Haldol [Haloperidol] Other (See Comments)     "I don't like it"    Ibuprofen     Lexapro [Escitalopram Oxalate] Rash    Navane [Thiothixene]      SI    Other      "novaine?" antipsychotic           Objective     Blood pressure 118/62, pulse 86, temperature (!) 97 °F (36 1 °C), temperature source Tympanic, height 5' 2" (1 575 m), weight 71 2 kg (157 lb), not currently breastfeeding  Body mass index is 28 72 kg/m²  PHYSICAL EXAM:      General Appearance:   Alert, cooperative, no distress   HEENT:   Normocephalic, atraumatic, anicteric      Neck:  Supple, symmetrical, trachea midline   Lungs:   Clear to auscultation bilaterally; no rales, rhonchi or wheezing; respirations unlabored    Heart[de-identified]   Regular rate and rhythm; no murmur, rub, or gallop  Abdomen:   Soft, non-tender, non-distended; normal bowel sounds; no masses, no organomegaly    Genitalia:   Deferred    Rectal:   Deferred    Extremities:  No cyanosis, clubbing or edema    Pulses:  2+ and symmetric    Skin:  No jaundice, rashes, or lesions    Lymph nodes:  No palpable cervical lymphadenopathy        Lab Results:   No visits with results within 1 Day(s) from this visit     Latest known visit with results is:   Appointment on 02/17/2021   Component Date Value    WBC 02/17/2021 6 01     RBC 02/17/2021 4 63     Hemoglobin 02/17/2021 13 9     Hematocrit 02/17/2021 44 4     MCV 02/17/2021 96     MCH 02/17/2021 30 0     MCHC 02/17/2021 31 3*    RDW 02/17/2021 13 1     MPV 02/17/2021 10 5     Platelets 47/94/5784 296     nRBC 02/17/2021 0     Neutrophils Relative 02/17/2021 64     Immat GRANS % 02/17/2021 0     Lymphocytes Relative 02/17/2021 27     Monocytes Relative 02/17/2021 7     Eosinophils Relative 02/17/2021 1     Basophils Relative 02/17/2021 1     Neutrophils Absolute 02/17/2021 3 91     Immature Grans Absolute 02/17/2021 0 02     Lymphocytes Absolute 02/17/2021 1 60     Monocytes Absolute 02/17/2021 0 41     Eosinophils Absolute 02/17/2021 0 04     Basophils Absolute 02/17/2021 0 03     Sodium 02/17/2021 144     Potassium 02/17/2021 4 6     Chloride 02/17/2021 112*    CO2 02/17/2021 26     ANION GAP 02/17/2021 6     BUN 02/17/2021 41*    Creatinine 02/17/2021 2 13*    Glucose, Fasting 02/17/2021 99     Calcium 02/17/2021 9 8     AST 02/17/2021 15     ALT 02/17/2021 21     Alkaline Phosphatase 02/17/2021 164*    Total Protein 02/17/2021 8 3*    Albumin 02/17/2021 3 7     Total Bilirubin 02/17/2021 0 31     eGFR 02/17/2021 26     Cholesterol 02/17/2021 285*    Triglycerides 02/17/2021 552*    HDL, Direct 02/17/2021 41     LDL Calculated 02/17/2021      Non-HDL-Chol (CHOL-HDL) 02/17/2021 244          Radiology Results:   No results found

## 2021-02-19 ENCOUNTER — DOCUMENTATION (OUTPATIENT)
Dept: SOCIAL WORK | Facility: HOSPITAL | Age: 51
End: 2021-02-19

## 2021-02-19 DIAGNOSIS — E11.9 TYPE 2 DIABETES MELLITUS WITHOUT COMPLICATION, WITH LONG-TERM CURRENT USE OF INSULIN (HCC): ICD-10-CM

## 2021-02-19 DIAGNOSIS — F31.9 BIPOLAR 1 DISORDER (HCC): ICD-10-CM

## 2021-02-19 DIAGNOSIS — Z79.4 TYPE 2 DIABETES MELLITUS WITHOUT COMPLICATION, WITH LONG-TERM CURRENT USE OF INSULIN (HCC): ICD-10-CM

## 2021-02-19 LAB — HETEROPH AB SER QL: NEGATIVE

## 2021-02-19 RX ORDER — DEXTROAMPHETAMINE SACCHARATE, AMPHETAMINE ASPARTATE MONOHYDRATE, DEXTROAMPHETAMINE SULFATE AND AMPHETAMINE SULFATE 5; 5; 5; 5 MG/1; MG/1; MG/1; MG/1
20 CAPSULE, EXTENDED RELEASE ORAL 2 TIMES DAILY
Qty: 60 CAPSULE | Refills: 0 | Status: SHIPPED | OUTPATIENT
Start: 2021-02-19 | End: 2021-03-19 | Stop reason: SDUPTHER

## 2021-02-19 NOTE — ED PROVIDER NOTES
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, Cushing, Chronic abdo pain, Gastroparesis s/p gastric pacemaker 6/2020, GERD, pancreatitis, fatty liver, on Anemia of chronic disease, microscopic polyangiitis, morbid obesity, T2DM, DVT, anxiety, PTSD, borderline personality disorder, SH/SI, asthma, ADHD, HTN  Pt eval by opt GI earlier today for GERD, gastroparesis, constipation, & elevated alk-phos (164) who is aware of her fatigue & suspected worsening liver func & sent pt for GTT, which resulted nml (GGT 35)  Also seen by PCP 3 days prior for GI sx's but did not mention fatigue at that time  Pt recently treated for vaginal yeast infection w/Diflucan by PCP on 2/9/21  Pt states that she was feeling incr fatigued for 2 wks, feeling weak, & cannot stand up for > 1 minute  Also complain of 8/10 left middle back pain & tenderness x3 days that is sharp, consistent, worse w/ movement, improves w/position changes but come back, regular prescribed medications & help  Charles Osborn Also complain of epigastric & LUQ pain, which her GI is aware, & pt does not feel this is similar to her prior pancreatitis on 11/11/20  Abdo pain also a/w NBNB vomiting once a day x3 days  Otherwise w/o any other sx's of concern  Prior to Admission Medications   Prescriptions Last Dose Informant Patient Reported? Taking?    Alcohol Swabs (ALCOHOL PADS) 70 % PADS  Self No Yes   Sig: by Does not apply route 4 (four) times a day   Blood Glucose Monitoring Suppl (FREESTYLE LITE) DEIRDRE  Self No Yes   Sig: by Does not apply route daily   Easy Comfort Lancets MISC  Self No Yes   Sig: USE THREE TIMES A DAY   FREESTYLE LITE test strip   No Yes   Sig: USE TO TEST THE BLOOD SUGAR THREE TIMES A DAY   Insulin Pen Needle (PEN NEEDLES) 31G X 8 MM MISC  Self No Yes   Sig: Inject 1 Stick under the skin 4 (four) times a day (with meals and at bedtime)   Movantik 25 MG tablet   Yes Yes   Nutritional Supplements (Ensure Original) LIQD   No Yes   Sig: Take 1 Bottle by mouth 2 (two) times a day   Probiotic Product (PROBIOTIC-10 PO)  Self Yes Yes   Sig: Take 1 tablet by mouth daily   QUEtiapine (SEROquel) 200 mg tablet   Yes Yes   Sig: Take 200 mg by mouth daily at bedtime   TiZANidine (ZANAFLEX) 4 MG capsule   No Yes   Sig: Take 1 capsule (4 mg total) by mouth 3 (three) times a day   albuterol (2 5 mg/3 mL) 0 083 % nebulizer solution   No Yes   Sig: Take 1 vial (2 5 mg total) by nebulization every 6 (six) hours as needed for wheezing or shortness of breath   albuterol (PROVENTIL HFA,VENTOLIN HFA) 90 mcg/act inhaler   No Yes   Sig: INHALE 2 PUFFS BY MOUTH EVERY SIX HOURS AS NEEDED FOR WHEEZING OR SHORTNESS OF BREATH   amLODIPine (NORVASC) 5 mg tablet   No Yes   Sig: TAKE ONE TABLET BY MOUTH ONCE DAILY   amphetamine-dextroamphetamine (ADDERALL XR) 20 MG 24 hr capsule   No Yes   Sig: Take 1 capsule (20 mg total) by mouth 2 (two) times a dayMax Daily Amount: 40 mg   buPROPion (WELLBUTRIN XL) 150 mg 24 hr tablet Not Taking at Unknown time Self Yes No   Sig: bupropion HCl  mg 24 hr tablet, extended release   buPROPion (WELLBUTRIN XL) 300 mg 24 hr tablet   Yes Yes   conjugated estrogens (Premarin) vaginal cream   No Yes   Sig: INSERT 1 GRAM PER VAGINA DAILY AT BEDTIME  FOR TWO WEEKS AND THEN CHANGE TO 1 GRAM TWICE WEEKLY   cycloSPORINE (RESTASIS) 0 05 % ophthalmic emulsion  Self Yes Yes   Sig: Administer 1 drop to both eyes 2 (two) times a day   dexlansoprazole (Dexilant) 60 MG capsule Not Taking at Unknown time  No No   Sig: Take 1 capsule (60 mg total) by mouth daily   Patient not taking: Reported on 2/18/2021   dicyclomine (BENTYL) 10 mg capsule   Yes Yes   docusate sodium (COLACE) 100 mg capsule   No Yes   Sig: Take 1 capsule (100 mg total) by mouth 2 (two) times a day   hydrocortisone 1 % cream   No Yes   Sig: Apply topically 4 (four) times a day as needed for irritation   insulin aspart (NovoLOG FlexPen) 100 UNIT/ML injection pen   No Yes   Sig: Inject 6 Units under the skin 3 (three) times a day with meals   insulin glargine (Toujeo Max SoloStar) 300 units/mL CONCETRATED U-300 injection pen (2-unit dial)   No Yes   Sig: Inject 22 Units under the skin daily   lidocaine (XYLOCAINE) 5 % ointment   No Yes   Sig: APPLY TOPICALLY 2GM TWICE A DAY TO AFFECTED AREAS AS NEEDED FOR MILD PAIN   linaCLOtide (Linzess) 72 MCG CAPS   No Yes   Sig: Take 72 mcg by mouth daily   montelukast (SINGULAIR) 10 mg tablet   No Yes   Sig: TAKE ONE TABLET BY MOUTH ONCE DAILY AT BEDTIME   ondansetron (ZOFRAN) 4 mg tablet   No Yes   Sig: Take 1 tablet (4 mg total) by mouth every 8 (eight) hours as needed for nausea or vomiting   oxyCODONE-acetaminophen (PERCOCET) 7 5-325 MG per tablet   No Yes   Sig: Take 1 tablet by mouth every 4 (four) hours as needed for severe painMax Daily Amount: 5 tablets   pantoprazole (PROTONIX) 40 mg tablet   No Yes   Sig: Take 1 tablet (40 mg total) by mouth 2 (two) times a day before meals   polyethylene glycol (GLYCOLAX) 17 GM/SCOOP powder   No Yes   Sig: DISSOLVE 17 GRAMS(ONE CAPFUL) IN 8 OUNCE OF WATER/JUICE & TAKE BY MOUTH DAILY AS DIRECTED *MAXIMUM ONE DOSE DAILY*   scopolamine (TRANSDERM-SCOP) 1 5 mg/3 days TD 72 hr patch   No Yes   Sig: Place 1 patch on the skin every third day   sucralfate (CARAFATE) 1 g/10 mL suspension   No Yes   Sig: Take 10 mL (1 g total) by mouth 4 (four) times a day (with meals and at bedtime)      Facility-Administered Medications: None       Past Medical History:   Diagnosis Date    ADHD     Anemia of chronic disease     Anxiety     Asthma     Asthma     Borderline personality disorder (HCC)     Cataplexy     Chronic abdominal pain     CKD (chronic kidney disease) stage 3, GFR 30-59 ml/min     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[address]     [facility]ushing syndrome [facility] [address] [facility]     Diabetes mellitus ([facility] [address] [facility]     DVT (deep venous thrombosis) (HCC)     GERD (gastroesophageal reflux disease)     History of acute pancreatitis     felt secondary to Bactrim    History of transfusion     HTN (hypertension)     Hypertension     Liver disease     fatty liver    Microscopic polyangiitis (HCC)     Morbid obesity (HCC)     MPA (microscopic polyangiitis) (HCC)     Ovarian cyst     PTSD (post-traumatic stress disorder)     Renal disorder     Self-inflicted injury     self inflicted skin wounds    Wegener's granulomatosis with renal involvement ([facility] [address] ) 2015       Past Surgical History:   Procedure Laterality Date    COLONOSCOPY      ESOPHAGOGASTRODUODENOSCOPY  09/11/2015    mild antral gastritis    GASTRIC STIMULATOR IMPLANT SURGERY  06/25/2020    OK COLONOSCOPY FLX DX W/COLLJ SPEC WHEN PFRMD N/A 12/14/2018    Procedure: COLONOSCOPY with polypectomy;  Surgeon: Warrick Galeazzi, MD;  Location: [facility]; Service: Gastroenterology    OK ESOPHAGOGASTRODUODENOSCOPY TRANSORAL DIAGNOSTIC N/A 12/14/2018    Procedure: ESOPHAGOGASTRODUODENOSCOPY (EGD) with biopsy;  Surgeon: Warrick Galeazzi, MD;  Location: [facility]; Service: Gastroenterology    RELEASE SCAR CONTRACTURE / GRAFT REPAIRS OF HAND Bilateral     UPPER GASTROINTESTINAL ENDOSCOPY  12/26/2019       Family History   Problem Relation Age of Onset    No Known Problems Mother     No Known Problems Father     Colon cancer Neg Hx     Drug abuse Neg Hx         mother father    Mental illness Neg Hx         disorder, mother father    Cancer Neg Hx     Breast cancer Neg Hx      I have reviewed and agree with the history as documented      E-Cigarette/Vaping    E-Cigarette Use Never User      E-Cigarette/Vaping Substances    Nicotine No     THC No     CBD No      Social History     Tobacco Use    Smoking status: Former Smoker     Quit date: 2011     Years since quitting: 10 1    Smokeless tobacco: Never Used    Tobacco comment: marijuana   Substance Use Topics    Alcohol use: No     Frequency: Never    Drug use: Yes     Types: Marijuana     Comment: marijuana daily        Review of Systems   Constitutional: Positive for appetite change and fatigue  Negative for activity change, chills, fever and unexpected weight change  HENT: Negative for congestion, ear discharge, ear pain, hearing loss, nosebleeds, rhinorrhea, sore throat, tinnitus, trouble swallowing and voice change  Eyes: Negative for pain, discharge and visual disturbance  Respiratory: Negative for apnea, cough, choking, chest tightness, shortness of breath, wheezing and stridor  Cardiovascular: Negative for chest pain and palpitations  Gastrointestinal: Positive for abdominal pain (Epigastric & LUQ), nausea and vomiting  Negative for abdominal distention, constipation and diarrhea  Genitourinary: Negative for dysuria  Musculoskeletal: Positive for back pain ( left-sided mid back pain)  Negative for arthralgias, myalgias, neck pain and neck stiffness  Neurological: Negative for dizziness, tremors and weakness  Psychiatric/Behavioral: Negative for agitation and behavioral problems  Physical Exam  ED Triage Vitals [02/18/21 2108]   Temperature Pulse Respirations Blood Pressure SpO2   98 2 °F (36 8 °C) (!) 106 20 (!) 146/103 98 %      Temp Source Heart Rate Source Patient Position - Orthostatic VS BP Location FiO2 (%)   Oral Monitor Sitting Right arm --      Pain Score       --             Orthostatic Vital Signs  Vitals:    02/18/21 2108   BP: (!) 146/103   Pulse: (!) 106   Patient Position - Orthostatic VS: Sitting       Physical Exam  Vitals signs reviewed  Constitutional:       General: She is not in acute distress  Appearance: Normal appearance  She is not ill-appearing, toxic-appearing or diaphoretic  HENT:      Head: Normocephalic and atraumatic  Right Ear: Tympanic membrane, ear canal and external ear normal  There is no impacted cerumen        Left Ear: Tympanic membrane, ear canal and external ear normal  There is no impacted cerumen  Nose: Nose normal  No congestion or rhinorrhea  Mouth/Throat:      Mouth: Mucous membranes are moist       Pharynx: Oropharynx is clear  No oropharyngeal exudate or posterior oropharyngeal erythema  Eyes:      General: No scleral icterus  Right eye: No discharge  Left eye: No discharge  Extraocular Movements: Extraocular movements intact  Conjunctiva/sclera: Conjunctivae normal       Pupils: Pupils are equal, round, and reactive to light  Neck:      Musculoskeletal: Normal range of motion  Cardiovascular:      Rate and Rhythm: Normal rate and regular rhythm  Pulses: Normal pulses  Heart sounds: Normal heart sounds  No murmur  No friction rub  No gallop  Pulmonary:      Effort: Pulmonary effort is normal  No respiratory distress  Breath sounds: Normal breath sounds  No stridor  No wheezing, rhonchi or rales  Chest:      Chest wall: No tenderness  Abdominal:      General: Abdomen is flat  Bowel sounds are normal  There is no distension  Palpations: Abdomen is soft  There is no mass  Tenderness: There is abdominal tenderness (Epigastric & LUQ tenderness on deep palpation)  There is no right CVA tenderness, left CVA tenderness, guarding or rebound  Hernia: No hernia is present  Musculoskeletal: Normal range of motion  General: Tenderness ( point tenderness & reproducible pain of left middle back, no bony tenderness, no sciatica, no neurological abnl) present  No swelling, deformity or signs of injury  Lumbar back: She exhibits tenderness, pain and spasm  She exhibits normal range of motion, no bony tenderness, no swelling, no edema, no deformity and no laceration  Back:    Skin:     General: Skin is warm  Neurological:      General: No focal deficit present  Mental Status: She is alert and oriented to person, place, and time        Sensory: No sensory deficit  Motor: No weakness  Psychiatric:         Mood and Affect: Mood normal          Behavior: Behavior normal          Thought Content: Thought content normal          Judgment: Judgment normal          ED Medications  Medications   sodium chloride 0 9 % bolus 1,000 mL (1,000 mL Intravenous New Bag 2/18/21 2220)   acetaminophen (TYLENOL) tablet 975 mg (0 mg Oral Hold 2/18/21 2235)   lidocaine (LIDODERM) 5 % patch 2 patch (2 patches Topical Medication Applied 2/18/21 2229)   ciprofloxacin (CIPRO) tablet 500 mg (has no administration in time range)   ondansetron (ZOFRAN) injection 4 mg (4 mg Intravenous Given 2/18/21 2231)       Diagnostic Studies  Results Reviewed     Procedure Component Value Units Date/Time    Urine Microscopic [[id number]]  (Abnormal) Collected: 02/18/21 2215    Lab Status: Final result Specimen: Urine, Clean Catch Updated: 02/18/21 2304     RBC, UA 0-1 /hpf      WBC, UA Innumerable /hpf      Epithelial Cells Occasional /hpf      Bacteria, UA Moderate /hpf      Hyaline Casts, UA 1-2 /lpf     Urine culture [[id number]] Collected: 02/18/21 2215    Lab Status: In process Specimen: Urine, Clean Catch Updated: 02/18/21 2304    COVID19, Influenza A/B, RSV PCR, [facility] [[id number]]  (Normal) Collected: 02/18/21 2211    Lab Status: Final result Specimen: Nares from Nasopharyngeal Swab Updated: 02/18/21 2259     SARS-CoV-2 Negative     INFLUENZA A PCR Negative     INFLUENZA B PCR Negative     RSV PCR Negative    Narrative: This test has been authorized by FDA under an EUA (Emergency Use Assay) for use by authorized laboratories  Clinical caution and judgement should be used with the interpretation of these results with consideration of the clinical impression and other laboratory testing  Testing reported as "Positive" or "Negative" has been proven to be accurate according to standard laboratory validation requirements    All testing is performed with control materials showing appropriate reactivity at standard intervals  TSH [[id number]]  (Normal) Collected: 02/18/21 2219    Lab Status: Final result Specimen: Blood from Arm, Right Updated: 02/18/21 2246     TSH 3RD GENERATON 1 204 uIU/mL     Narrative:      Patients undergoing fluorescein dye angiography may retain small amounts of fluorescein in the body for 48-72 hours post procedure  Samples containing fluorescein can produce falsely depressed TSH values  If the patient had this procedure,a specimen should be resubmitted post fluorescein clearance  Lipase [[id number]]  (Normal) Collected: 02/18/21 2219    Lab Status: Final result Specimen: Blood from Arm, Right Updated: 02/18/21 2246     Lipase 242 u/L     Lyme Antibody Profile with reflex to [facility] [[id number]] Collected: 02/18/21 2219    Lab Status: In process Specimen: Blood from Arm, Right Updated: 02/18/21 2223    Mononucleosis screen [[id number]] Collected: 02/18/21 2219    Lab Status:  In process Specimen: Blood from Arm, Right Updated: 02/18/21 2223    Urine Macroscopic, POC [[id number]]  (Abnormal) Collected: 02/18/21 2215    Lab Status: Final result Specimen: Urine Updated: 02/18/21 2217     Color, UA Yellow     Clarity, UA Cloudy     pH, UA 5 5     Leukocytes, UA Small     Nitrite, UA Negative     Protein,  (2+) mg/dl      Glucose, UA Negative mg/dl      Ketones, UA Negative mg/dl      Urobilinogen, UA 0 2 E U /dl      Bilirubin, UA Negative     Blood, UA Small     Specific [place], UA >=1 030    Narrative:      CLINITEK RESULT                 No orders to display         Procedures  Procedures      ED Course          MDM  Number of Diagnoses or Management Options  Epigastric discomfort:   Fatigue:   GERD (gastroesophageal reflux disease):   Left flank discomfort:   Nausea:   Pyelonephritis:   UTI (urinary tract infection):   Diagnosis management comments: 50-YOM present w/ increasing fatigue x2 wks & left flank pain x3 days, likely 2/2 uncomplicated pyelonephritis given UA (+) small leuk's, innumerable WBC, mod bacteria  Received Ciprofloxacin 500mg PO, & will d/c home on Ciprofloxacin 500mg PO BID x7 days w/ PCP f/u  Disposition  Final diagnoses:   Fatigue   UTI (urinary tract infection)   Left flank discomfort   Pyelonephritis   Nausea   Epigastric discomfort   GERD (gastroesophageal reflux disease)     Time reflects when diagnosis was documented in both MDM as applicable and the Disposition within this note     Time User Action Codes Description Comment    2/18/2021 11:06 PM Jeysonblas Dacostaian M Add [R53 83] Fatigue     2/18/2021 11:06 PM Jeysonblas Dacostaian M Add [N39 0] UTI (urinary tract infection)     2/18/2021 11:06 PM Gregorio Dacostaian M Add [R10 9] Left flank discomfort     2/18/2021 11:06 PM Pasha Yadira Add [N12] Pyelonephritis     2/18/2021 11:06 PM Jeysonblas Dacostaian M Add [R11 0] Nausea     2/18/2021 11:06 PM Gregorio Dacostaian M Add [R10 13] Epigastric discomfort     2/18/2021 11:06 PM Gregorio Dacostaian M Add [K21 9] GERD (gastroesophageal reflux disease)       ED Disposition     ED Disposition Condition Date/Time Comment    Discharge Stable u Feb 18, 2021 11:15 PM [facility] discharge to home/self care              Follow-up Information     Follow up With Specialties Details Why Contact Info Additional Information    Katarzyna Eid PA-C Family Medicine Schedule an appointment as soon as possible for a visit in 1 week  [address]  [address] [facility]  [address]  [facility] [address]        [address] [place] [address] Emergency Department Emergency Medicine Go to  If symptoms worsen [facility] [id number]  [address] [place] [address] Emergency Department, [address] [place] [address], [id number]          Patient's Medications   Discharge Prescriptions    CIPROFLOXACIN (CIPRO) 500 MG TABLET    Take 1 tablet (500 mg total) by mouth 2 (two) times a day for 7 days       Start Date: 2/18/2021 End Date: 2/25/2021       Order Dose: 500 mg Quantity: 14 tablet    Refills: 0     No discharge procedures on file  PDMP Review       Value Time User    PDMP Reviewed  Yes 2/8/2021 11:42 AM Stacey Ken PA-C           ED Provider  Attending physically available and evaluated Yolande Goltz  I managed the patient along with the ED Attending  Electronically Signed by    PHANI Marion   PGY-1, Family Medicine  02/18/21  11:18 PM    Dear reader, please be aware that portions of my note contain dictated text  I have done my best to proof-read this note prior to signing  However, there may be occasional unnoticed errors pertaining to "sound-alike" words and/or grammar during my dictation process  If there is any words or information that is unclear or appears erroneous, please kindly let me know and I will clarify and/or addend my notes accordingly  Thank you for your understanding         Carolyn Madrid MD  02/18/21 7934

## 2021-02-19 NOTE — ED ATTENDING ATTESTATION
2/18/2021  Lele CASTRO, DO, saw and evaluated the patient  I have discussed the patient with the resident/non-physician practitioner and agree with the resident's/non-physician practitioner's findings, Plan of Care, and MDM as documented in the resident's/non-physician practitioner's note, except where noted  All available labs and Radiology studies were reviewed  I was present for key portions of any procedure(s) performed by the resident/non-physician practitioner and I was immediately available to provide assistance  At this point I agree with the current assessment done in the Emergency Department  I have conducted an independent evaluation of this patient a history and physical is as follows:    ED Course     Pt seen and examined  49 yo female presenting with 2 weeks of worsening fatigue - seen by PCP 3 days ago for indigestion and GERD symptoms (did not mention fatigue at this visit) and seen by GI this am for GERD, gastroparesis and elevated alk phos (did mention fatigue to GI who thought maybe it was worsening liver function and sent her for GGT which was nml)  Pt was admitted Nov 220 for pancreatitis  Also complain of 8/10 left middle back pain & tenderness x3 days that is sharp, consistent, worse w/ movement and mild nausea  Agree with plan to give IVF bolus, check labs including lyme, TSH and mono, COVID swab and urine dip  Will give zofran, tylenol and lidoderm patch  No confusion on exam and neuro intact  Had CBC and CMP yesterday which were at baseline - will not repeat these  2253 - Lipase 242, TSH 1 2, urine micro pending, COVID, mono and lyme pending  2308 - Urine micro confirms UTI - with L flank pain will treat with 10 days cipro BID to cover pyelo  Urine cx pending  Can f/u with PCP and GI for continued workup of GI complaints       Final diagnoses:   Fatigue   UTI (urinary tract infection)   Left flank discomfort   Pyelonephritis   Nausea   Epigastric discomfort   GERD (gastroesophageal reflux disease)         Critical Care Time  Procedures

## 2021-02-19 NOTE — PROGRESS NOTES
Change in home PT [facility]    TC to patient to schedule PT [facility] with patient declining visit for today 2 19 due to weather and agreeable to visit on 2 22       Bassam Gonzalez DPT

## 2021-02-20 LAB
B BURGDOR IGG+IGM SER-ACNC: 18
BACTERIA UR CULT: NORMAL

## 2021-02-22 ENCOUNTER — DOCUMENTATION (OUTPATIENT)
Dept: SOCIAL WORK | Facility: HOSPITAL | Age: 51
End: 2021-02-22

## 2021-02-22 ENCOUNTER — OFFICE VISIT (OUTPATIENT)
Dept: FAMILY MEDICINE CLINIC | Facility: CLINIC | Age: 51
End: 2021-02-22

## 2021-02-22 VITALS
DIASTOLIC BLOOD PRESSURE: 80 MMHG | RESPIRATION RATE: 16 BRPM | HEART RATE: 83 BPM | OXYGEN SATURATION: 98 % | SYSTOLIC BLOOD PRESSURE: 122 MMHG | TEMPERATURE: 98 F

## 2021-02-22 DIAGNOSIS — M94.0 COSTOCHONDRITIS: Primary | ICD-10-CM

## 2021-02-22 PROCEDURE — 99213 OFFICE O/P EST LOW 20 MIN: CPT | Performed by: INTERNAL MEDICINE

## 2021-02-22 RX ORDER — INSULIN GLARGINE 300 U/ML
22 INJECTION, SOLUTION SUBCUTANEOUS DAILY
Qty: 3 PEN | Refills: 1 | Status: SHIPPED | OUTPATIENT
Start: 2021-02-22 | End: 2021-06-02 | Stop reason: SDUPTHER

## 2021-02-22 NOTE — ASSESSMENT & PLAN NOTE
-Chest wall pain that is reproducible on palpation  -Discussed stretching exercise & applying heat pad to the area  -Continue tylenol for pain relieve

## 2021-02-22 NOTE — PROGRESS NOTES
Assessment/Plan:    Costochondritis  -Chest wall pain that is reproducible on palpation  -Discussed stretching exercise & applying heat pad to the area  -Continue tylenol for pain relieve      Return in about 4 weeks (around 3/22/2021) for Next scheduled follow up for physical       There are no Patient Instructions on file for this visit  Diagnoses and all orders for this visit:    Costochondritis        Subjective:     Beth Sung is a 48 y o  female who  has a past medical history of ADHD, Anemia of chronic disease, Anxiety, Asthma, Asthma, Borderline personality disorder ([address] ), Cataplexy, Chronic abdominal pain, CKD (chronic kidney disease) stage 3, GFR 30-59 ml/min, 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[facility] [address] ), Cushing syndrome ([address] ), Diabetes mellitus ([address] ), DVT (deep venous thrombosis) ([facility] [address] ), GERD (gastroesophageal reflux disease), History of acute pancreatitis, History of transfusion, HTN (hypertension), Hypertension, Liver disease, Microscopic polyangiitis ([facility] [address] ), Morbid obesity ([address] ), MPA (microscopic polyangiitis) ([address] ), Ovarian cyst, PTSD (post-traumatic stress disorder), Renal disorder, Self-inflicted injury, and Wegener's granulomatosis with renal involvement ([address] )  She also has no past medical history of Hyperlipidemia  who presented to the office today for follow up on chest wall pain n that is ongoing for couple of weeks  It is reproducible on palpation of her chest wall  She states that she wanted to have it evaluated to make sure it is nothing serious  She is on chronic narcotic ( oxycodone 7 5mg) for chronic pain  She states the pain is minimal and is only present when she pushes on her chest or when she moves in certain position  She denies any fever, chills, nausea, vomiting, rash, shortness of breath, and palpitation  HPI      The following portions of the patient's history were reviewed and updated as appropriate: allergies, past family history, past surgical history and problem list     Current Outpatient Medications on File Prior to Visit   Medication Sig Dispense Refill    albuterol (2 5 mg/3 mL) 0 083 % nebulizer solution Take 1 vial (2 5 mg total) by nebulization every 6 (six) hours as needed for wheezing or shortness of breath 150 mL 0    albuterol (PROVENTIL HFA,VENTOLIN HFA) 90 mcg/act inhaler INHALE 2 PUFFS BY MOUTH EVERY SIX HOURS AS NEEDED FOR WHEEZING OR SHORTNESS OF BREATH 18 g 1    Alcohol Swabs (ALCOHOL PADS) 70 % PADS by Does not apply route 4 (four) times a day 400 each 3    amLODIPine (NORVASC) 5 mg tablet TAKE ONE TABLET BY MOUTH ONCE DAILY 90 tablet 2    amphetamine-dextroamphetamine (ADDERALL XR) 20 MG 24 hr capsule Take 1 capsule (20 mg total) by mouth 2 (two) times a dayMax Daily Amount: 40 mg 60 capsule 0    Blood Glucose Monitoring Suppl (FREESTYLE LITE) DEIRDRE by Does not apply route daily 1 each 0    buPROPion (WELLBUTRIN XL) 150 mg 24 hr tablet bupropion HCl  mg 24 hr tablet, extended release      buPROPion (WELLBUTRIN XL) 300 mg 24 hr tablet       ciprofloxacin (Cipro) 500 mg tablet Take 1 tablet (500 mg total) by mouth 2 (two) times a day for 7 days 14 tablet 0    conjugated estrogens (Premarin) vaginal cream INSERT 1 GRAM PER VAGINA DAILY AT BEDTIME  FOR TWO WEEKS AND THEN CHANGE TO 1 GRAM TWICE WEEKLY 30 g 2    cycloSPORINE (RESTASIS) 0 05 % ophthalmic emulsion Administer 1 drop to both eyes 2 (two) times a day      dicyclomine (BENTYL) 10 mg capsule       docusate sodium (COLACE) 100 mg capsule Take 1 capsule (100 mg total) by mouth 2 (two) times a day 180 capsule 1    Easy Comfort Lancets MISC USE THREE TIMES A  each 3    FREESTYLE LITE test strip USE TO TEST THE BLOOD SUGAR THREE TIMES A  each 2    hydrocortisone 1 % cream Apply topically 4 (four) times a day as needed for irritation 60 g 0    insulin aspart (NovoLOG FlexPen) 100 UNIT/ML injection pen Inject 6 Units under the skin 3 (three) times a day with meals 15 mL 2    Insulin Pen Needle (PEN NEEDLES) 31G X 8 MM MISC Inject 1 Stick under the skin 4 (four) times a day (with meals and at bedtime) 100 each 6    lidocaine (XYLOCAINE) 5 % ointment APPLY TOPICALLY 2GM TWICE A DAY TO AFFECTED AREAS AS NEEDED FOR MILD PAIN 250 g 8    linaCLOtide (Linzess) 72 MCG CAPS Take 72 mcg by mouth daily 90 capsule 3    montelukast (SINGULAIR) 10 mg tablet TAKE ONE TABLET BY MOUTH ONCE DAILY AT BEDTIME 90 tablet 2    Movantik 25 MG tablet       Nutritional Supplements (Ensure Original) LIQD Take 1 Bottle by mouth 2 (two) times a day 60 Bottle 11    ondansetron (ZOFRAN) 4 mg tablet Take 1 tablet (4 mg total) by mouth every 8 (eight) hours as needed for nausea or vomiting 30 tablet 2    oxyCODONE-acetaminophen (PERCOCET) 7 5-325 MG per tablet Take 1 tablet by mouth every 4 (four) hours as needed for severe painMax Daily Amount: 5 tablets 150 tablet 0    pantoprazole (PROTONIX) 40 mg tablet Take 1 tablet (40 mg total) by mouth 2 (two) times a day before meals 60 tablet 5    polyethylene glycol (GLYCOLAX) 17 GM/SCOOP powder DISSOLVE 17 GRAMS(ONE CAPFUL) IN 8 OUNCE OF WATER/JUICE & TAKE BY MOUTH DAILY AS DIRECTED *MAXIMUM ONE DOSE DAILY* 510 g 1    Probiotic Product (PROBIOTIC-10 PO) Take 1 tablet by mouth daily      QUEtiapine (SEROquel) 200 mg tablet Take 200 mg by mouth daily at bedtime      scopolamine (TRANSDERM-SCOP) 1 5 mg/3 days TD 72 hr patch Place 1 patch on the skin every third day 10 patch 3    sucralfate (CARAFATE) 1 g/10 mL suspension Take 10 mL (1 g total) by mouth 4 (four) times a day (with meals and at bedtime) 420 mL 1    TiZANidine (ZANAFLEX) 4 MG capsule Take 1 capsule (4 mg total) by mouth 3 (three) times a day 90 capsule 2    Toujeo Max SoloStar 300 units/mL CONCETRATED U-300 injection pen (2-unit dial) INJECT 22 UNITS UNDER THE SKIN DAILY 3 pen 1    dexlansoprazole (Dexilant) 60 MG capsule Take 1 capsule (60 mg total) by mouth daily (Patient not taking: Reported on 2/18/2021) 30 capsule 3 No current facility-administered medications on file prior to visit  Review of Systems   Constitutional: Negative for chills, diaphoresis, fatigue and fever  Respiratory: Negative for shortness of breath and wheezing  Cardiovascular: Negative for palpitations and leg swelling  Gastrointestinal: Negative for nausea and vomiting  Musculoskeletal: Negative for joint swelling  Skin: Negative for rash  Neurological: Negative for seizures, syncope, light-headedness, numbness and headaches  Objective:    /80 (BP Location: Right arm, Patient Position: Sitting, Cuff Size: Standard)   Pulse 83   Temp 98 °F (36 7 °C) (Temporal)   Resp 16   LMP  (LMP Unknown)   SpO2 98%     Physical Exam  Constitutional:       General: She is not in acute distress  Appearance: She is not ill-appearing, toxic-appearing or diaphoretic  Eyes:      General:         Right eye: No discharge  Extraocular Movements: Extraocular movements intact  Cardiovascular:      Rate and Rhythm: Normal rate  Heart sounds: No murmur  No friction rub  No gallop  Pulmonary:      Effort: No respiratory distress  Breath sounds: No wheezing  Comments: Tenderness to palpation along midsternum  Chest:      Chest wall: Tenderness present  Abdominal:      General: Abdomen is flat  There is no distension  Palpations: There is no mass  Tenderness: There is no abdominal tenderness  Skin:     General: Skin is warm  Neurological:      Mental Status: She is alert and oriented to person, place, and time           Amanda Sinha MD  02/23/21  9:14 AM

## 2021-02-22 NOTE — PROGRESS NOTES
Notification of change in home PT SOC date to 2 23 due to patient vomiting this AM and requesting eval to be moved to tomorrow      Kirt Oppenheim, DPT

## 2021-02-23 ENCOUNTER — DOCUMENTATION (OUTPATIENT)
Dept: SOCIAL WORK | Facility: HOSPITAL | Age: 51
End: 2021-02-23

## 2021-02-23 NOTE — PROGRESS NOTES
Admission Report at [facility] has admitted your patient to [facility] service with the following disciplines:      PT  Response needed, please respond via tiger text or phone call [phone]  Primary focus of home health care small fiber neuropathy  Patient stated goals of care "to have muscle release to move better"  Anticipated visit pattern and next visit date 2w2 1w1  Significant clinical findings Decrease strength in B  ues and les of approx 3/5  Request for additional disciplines none at this time  Request for medication clarification  Med review performed this session and noted following concerns  cipro and tizanidine cipro and movantik  level 1 interaction  cipro and quetiapine   cipro and movabtik   ondansteron and cipro  level 2 interaction  nebulizer and ventolin duplicate med therapy noted  May patient continue to take the above? Request for other order clarification None  Needs follow up physician appointments scheduled Has pcp fu and neuro fu  Potential barriers to goal achievement pain  Other pertinent information None at this time    Thank you for allowing us to participate in the care of your patient        Mark Moncada

## 2021-02-24 ENCOUNTER — TELEPHONE (OUTPATIENT)
Dept: FAMILY MEDICINE CLINIC | Facility: CLINIC | Age: 51
End: 2021-02-24

## 2021-02-26 ENCOUNTER — TELEPHONE (OUTPATIENT)
Dept: FAMILY MEDICINE CLINIC | Facility: CLINIC | Age: 51
End: 2021-02-26

## 2021-02-26 DIAGNOSIS — R21 RASH: ICD-10-CM

## 2021-02-26 DIAGNOSIS — E11.22 TYPE 2 DIABETES MELLITUS WITH STAGE 4 CHRONIC KIDNEY DISEASE, UNSPECIFIED WHETHER LONG TERM INSULIN USE (HCC): Primary | ICD-10-CM

## 2021-02-26 DIAGNOSIS — N18.4 TYPE 2 DIABETES MELLITUS WITH STAGE 4 CHRONIC KIDNEY DISEASE, UNSPECIFIED WHETHER LONG TERM INSULIN USE (HCC): Primary | ICD-10-CM

## 2021-02-28 RX ORDER — DIAPER,BRIEF,INFANT-TODD,DISP
EACH MISCELLANEOUS
Qty: 56 G | Refills: 0 | Status: SHIPPED | OUTPATIENT
Start: 2021-02-28 | End: 2021-03-17 | Stop reason: SDUPTHER

## 2021-03-01 ENCOUNTER — TELEPHONE (OUTPATIENT)
Dept: GASTROENTEROLOGY | Facility: CLINIC | Age: 51
End: 2021-03-01

## 2021-03-01 RX ORDER — BLOOD-GLUCOSE METER
EACH MISCELLANEOUS
Qty: 300 EACH | Refills: 10 | Status: SHIPPED | OUTPATIENT
Start: 2021-03-01

## 2021-03-01 NOTE — TELEPHONE ENCOUNTER
Patients GI provider:  MARIANELA Shelton     Number to return call: [phone]    Reason for call: Pt calling stating prior is needed for dexlansoprazole (Dexilant) 60 MG capsule       Scheduled procedure/appointment date if applicable: N/A

## 2021-03-02 ENCOUNTER — TELEPHONE (OUTPATIENT)
Dept: FAMILY MEDICINE CLINIC | Facility: CLINIC | Age: 51
End: 2021-03-02

## 2021-03-02 NOTE — TELEPHONE ENCOUNTER
Patient had called concerned about her chol being high at the er  I wanted to give her an appointment, and she declined  Wanted to be sent to nurse line

## 2021-03-03 ENCOUNTER — TELEPHONE (OUTPATIENT)
Dept: FAMILY MEDICINE CLINIC | Facility: CLINIC | Age: 51
End: 2021-03-03

## 2021-03-03 DIAGNOSIS — E78.2 MIXED HYPERLIPIDEMIA: Primary | ICD-10-CM

## 2021-03-03 RX ORDER — ATORVASTATIN CALCIUM 20 MG/1
20 TABLET, FILM COATED ORAL DAILY
Qty: 90 TABLET | Refills: 1 | Status: SHIPPED | OUTPATIENT
Start: 2021-03-03 | End: 2021-08-11

## 2021-03-03 NOTE — TELEPHONE ENCOUNTER
Patient had called concerned about her chol being high at the er states she awaiting results  States she does not need an appt they told her, her pcp just need to prescribe her medication?

## 2021-03-06 DIAGNOSIS — B37.3 VAGINAL YEAST INFECTION: ICD-10-CM

## 2021-03-07 RX ORDER — FLUCONAZOLE 150 MG/1
TABLET ORAL
Qty: 3 TABLET | Refills: 0 | OUTPATIENT
Start: 2021-03-07

## 2021-03-09 DIAGNOSIS — M31.30 WEGENER'S GRANULOMATOSIS: Chronic | ICD-10-CM

## 2021-03-09 DIAGNOSIS — G89.4 CHRONIC PAIN SYNDROME: ICD-10-CM

## 2021-03-09 RX ORDER — OXYCODONE AND ACETAMINOPHEN 7.5; 325 MG/1; MG/1
1 TABLET ORAL EVERY 4 HOURS PRN
Qty: 150 TABLET | Refills: 0 | Status: SHIPPED | OUTPATIENT
Start: 2021-03-09 | End: 2021-03-29 | Stop reason: DRUGHIGH

## 2021-03-10 ENCOUNTER — TELEPHONE (OUTPATIENT)
Dept: FAMILY MEDICINE CLINIC | Facility: CLINIC | Age: 51
End: 2021-03-10

## 2021-03-10 NOTE — TELEPHONE ENCOUNTER
SIGNATURES NEEDED FOR [facility] pharmacy ( controlled substance refill oxycodne)  FORM RECEIVED VIA FAX  WILL BE PLACED IN YOUR BIN AT ASSIGNED DELIVERY TIMES

## 2021-03-17 DIAGNOSIS — R21 RASH: ICD-10-CM

## 2021-03-17 DIAGNOSIS — J45.20 MILD INTERMITTENT ASTHMA WITHOUT COMPLICATION: Chronic | ICD-10-CM

## 2021-03-17 RX ORDER — ALBUTEROL SULFATE 90 UG/1
2 AEROSOL, METERED RESPIRATORY (INHALATION) EVERY 6 HOURS PRN
Qty: 18 G | Refills: 1 | Status: SHIPPED | OUTPATIENT
Start: 2021-03-17 | End: 2021-05-10

## 2021-03-17 RX ORDER — DIAPER,BRIEF,INFANT-TODD,DISP
EACH MISCELLANEOUS 4 TIMES DAILY PRN
Qty: 56 G | Refills: 1 | Status: SHIPPED | OUTPATIENT
Start: 2021-03-17 | End: 2021-04-13 | Stop reason: SDUPTHER

## 2021-03-19 DIAGNOSIS — F31.9 BIPOLAR 1 DISORDER (HCC): ICD-10-CM

## 2021-03-19 RX ORDER — DEXTROAMPHETAMINE SACCHARATE, AMPHETAMINE ASPARTATE MONOHYDRATE, DEXTROAMPHETAMINE SULFATE AND AMPHETAMINE SULFATE 5; 5; 5; 5 MG/1; MG/1; MG/1; MG/1
20 CAPSULE, EXTENDED RELEASE ORAL 2 TIMES DAILY
Qty: 60 CAPSULE | Refills: 0 | Status: SHIPPED | OUTPATIENT
Start: 2021-03-19 | End: 2021-04-19 | Stop reason: SDUPTHER

## 2021-03-24 ENCOUNTER — TELEPHONE (OUTPATIENT)
Dept: FAMILY MEDICINE CLINIC | Facility: CLINIC | Age: 51
End: 2021-03-24

## 2021-03-24 NOTE — TELEPHONE ENCOUNTER
Rani Estrella () called  Requesting a prescription for ensure states the prescription should have type of Ensure(reg or plus) and number of cans per day and patients demographic information   That order she wants that ordered faxed to [phone]    she also requested a physical therapy in home  Evaluation she would like that faxed to [phone] attention to [facility]   Her phone number is [phone]

## 2021-03-29 ENCOUNTER — TELEPHONE (OUTPATIENT)
Dept: NEUROLOGY | Facility: CLINIC | Age: 51
End: 2021-03-29

## 2021-03-29 ENCOUNTER — OFFICE VISIT (OUTPATIENT)
Dept: FAMILY MEDICINE CLINIC | Facility: CLINIC | Age: 51
End: 2021-03-29

## 2021-03-29 VITALS
OXYGEN SATURATION: 97 % | HEIGHT: 62 IN | TEMPERATURE: 97.3 F | SYSTOLIC BLOOD PRESSURE: 120 MMHG | BODY MASS INDEX: 30.62 KG/M2 | RESPIRATION RATE: 20 BRPM | DIASTOLIC BLOOD PRESSURE: 84 MMHG | HEART RATE: 80 BPM | WEIGHT: 166.4 LBS

## 2021-03-29 DIAGNOSIS — E11.22 TYPE 2 DIABETES MELLITUS WITH STAGE 4 CHRONIC KIDNEY DISEASE, UNSPECIFIED WHETHER LONG TERM INSULIN USE (HCC): ICD-10-CM

## 2021-03-29 DIAGNOSIS — G47.411 CATAPLEXY: ICD-10-CM

## 2021-03-29 DIAGNOSIS — K21.9 GASTROESOPHAGEAL REFLUX DISEASE, UNSPECIFIED WHETHER ESOPHAGITIS PRESENT: ICD-10-CM

## 2021-03-29 DIAGNOSIS — G62.9 SMALL FIBER NEUROPATHY: ICD-10-CM

## 2021-03-29 DIAGNOSIS — F90.9 ATTENTION DEFICIT HYPERACTIVITY DISORDER (ADHD), UNSPECIFIED ADHD TYPE: ICD-10-CM

## 2021-03-29 DIAGNOSIS — Z13.31 DEPRESSION SCREEN: ICD-10-CM

## 2021-03-29 DIAGNOSIS — B37.3 RECURRENT CANDIDIASIS OF VAGINA: ICD-10-CM

## 2021-03-29 DIAGNOSIS — G89.4 CHRONIC PAIN SYNDROME: ICD-10-CM

## 2021-03-29 DIAGNOSIS — Z00.00 MEDICARE ANNUAL WELLNESS VISIT, SUBSEQUENT: Primary | ICD-10-CM

## 2021-03-29 DIAGNOSIS — M31.31 GRANULOMATOSIS WITH POLYANGIITIS WITH RENAL INVOLVEMENT (HCC): Chronic | ICD-10-CM

## 2021-03-29 DIAGNOSIS — K31.84 GASTROPARESIS: ICD-10-CM

## 2021-03-29 DIAGNOSIS — N18.4 TYPE 2 DIABETES MELLITUS WITH STAGE 4 CHRONIC KIDNEY DISEASE, UNSPECIFIED WHETHER LONG TERM INSULIN USE (HCC): ICD-10-CM

## 2021-03-29 DIAGNOSIS — R29.898 WEAKNESS OF BOTH LOWER EXTREMITIES: ICD-10-CM

## 2021-03-29 DIAGNOSIS — K59.00 CONSTIPATION, UNSPECIFIED CONSTIPATION TYPE: ICD-10-CM

## 2021-03-29 PROCEDURE — 3008F BODY MASS INDEX DOCD: CPT | Performed by: PHYSICIAN ASSISTANT

## 2021-03-29 PROCEDURE — 1036F TOBACCO NON-USER: CPT | Performed by: PHYSICIAN ASSISTANT

## 2021-03-29 PROCEDURE — 3725F SCREEN DEPRESSION PERFORMED: CPT | Performed by: PHYSICIAN ASSISTANT

## 2021-03-29 PROCEDURE — G0439 PPPS, SUBSEQ VISIT: HCPCS | Performed by: PHYSICIAN ASSISTANT

## 2021-03-29 PROCEDURE — 99215 OFFICE O/P EST HI 40 MIN: CPT | Performed by: PHYSICIAN ASSISTANT

## 2021-03-29 RX ORDER — OXYCODONE AND ACETAMINOPHEN 10; 325 MG/1; MG/1
1 TABLET ORAL EVERY 6 HOURS PRN
Qty: 120 TABLET | Refills: 0 | Status: SHIPPED | OUTPATIENT
Start: 2021-04-10 | End: 2021-05-07 | Stop reason: SDUPTHER

## 2021-03-29 NOTE — TELEPHONE ENCOUNTER
Called to confirm the appointment on 4/5/21 at 10:15 am with [facility]

## 2021-03-29 NOTE — PROGRESS NOTES
Assessment and Plan:     Problem List Items Addressed This Visit        Digestive    Gastroesophageal reflux disease    Gastroparesis       Endocrine    Type 2 diabetes mellitus with renal complication (HCC)       Cardiovascular and Mediastinum    Wegener's granulomatosis ([address] ) (Chronic)    Relevant Medications    oxyCODONE-acetaminophen (PERCOCET)  mg per tablet (Start on 4/10/2021)       Nervous and Auditory    Cataplexy    Weakness of both lower extremities    Small fiber neuropathy       Other    Chronic pain    Relevant Medications    oxyCODONE-acetaminophen (PERCOCET)  mg per tablet (Start on 4/10/2021)    Attention deficit hyperactivity disorder (ADHD)      Other Visit Diagnoses     Medicare annual wellness visit, subsequent    -  Primary    Depression screen        Recurrent candidiasis of vagina        Constipation, unspecified constipation type               Preventive health issues were discussed with patient, and age appropriate screening tests were ordered as noted in patient's After Visit Summary  Personalized health advice and appropriate referrals for health education or preventive services given if needed, as noted in patient's After Visit Summary  History of Present Illness:     Patient presents for Welcome to Medicare visit       Patient Care Team:  Carle Paget, PA-C as PCP - General (Family Medicine)     Problem List:     Patient Active Problem List   Diagnosis    Type 2 diabetes mellitus with renal complication ([address] )    Dyslipidemia    Wegener's granulomatosis ([address] )    CKD (chronic kidney disease) stage 4, GFR 15-29 ml/min (HCC)    MPA (microscopic polyangiitis) (HCC)    Asthma    Benign hypertension with CKD (chronic kidney disease) stage IV (HCC)    Acute pancreatitis    Chronic pain    Noncompliance    Bipolar 1 disorder (HCC)    Epigastric pain    Pancreatitis    Ovarian cyst    Postherpetic neuralgia    Anemia of chronic disease    Arthralgia of multiple joints    Cataplexy    Weakness of both lower extremities    Diarrhea of presumed infectious origin    Chronic pelvic pain in female    Plantar wart, right foot    Seasonal allergic rhinitis due to pollen    Gastroesophageal reflux disease    Weakness of both upper extremities    Persistent proteinuria    Left leg pain    Controlled substance agreement signed    Chronic narcotic use    Small fiber neuropathy    IBS (irritable bowel syndrome)    Leukocytosis    Gastroparesis    Hyperosmia    Smell disturbance    Contusion of left hip    Trochanteric bursitis of left hip    Neuropathy    Attention deficit hyperactivity disorder (ADHD)    Elevated blood pressure reading    Costochondritis      Past Medical and Surgical History:     Past Medical History:   Diagnosis Date    ADHD     Anemia of chronic disease     Anxiety     Asthma     Asthma     Borderline personality disorder (HCC)     Cataplexy     Chronic abdominal pain     CKD (chronic kidney disease) stage 3, GFR 30-59 ml/min     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 )     Cushing syndrome ([facility] )     Diabetes mellitus ([facility] )     DVT (deep venous thrombosis) (HCC)     GERD (gastroesophageal reflux disease)     History of acute pancreatitis     felt secondary to Bactrim    History of transfusion     HTN (hypertension)     Hypertension     Liver disease     fatty liver    Microscopic polyangiitis (HCC)     Morbid obesity (HCC)     MPA (microscopic polyangiitis) (HCC)     Ovarian cyst     PTSD (post-traumatic stress disorder)     Renal disorder     Self-inflicted injury     self inflicted skin wounds    Wegener's granulomatosis with renal involvement ([facility] ) 2015     Past Surgical History:   Procedure Laterality Date    COLONOSCOPY      ESOPHAGOGASTRODUODENOSCOPY  09/11/2015    mild antral gastritis    GASTRIC STIMULATOR IMPLANT SURGERY  06/25/2020    MI COLONOSCOPY FLX DX W/COLLJ SPEC WHEN PFRMD N/A 12/14/2018    Procedure: COLONOSCOPY with polypectomy;  Surgeon: Latoya Bucio MD;  Location: [facility]; Service: Gastroenterology    HI ESOPHAGOGASTRODUODENOSCOPY TRANSORAL DIAGNOSTIC N/A 12/14/2018    Procedure: ESOPHAGOGASTRODUODENOSCOPY (EGD) with biopsy;  Surgeon: Latoya Bucio MD;  Location: [facility];   Service: Gastroenterology    RELEASE SCAR CONTRACTURE / GRAFT REPAIRS OF HAND Bilateral     UPPER GASTROINTESTINAL ENDOSCOPY  12/26/2019      Family History:     Family History   Problem Relation Age of Onset    No Known Problems Mother     No Known Problems Father     Colon cancer Neg Hx     Drug abuse Neg Hx         mother father    Mental illness Neg Hx         disorder, mother father    Cancer Neg Hx     Breast cancer Neg Hx       Social History:     E-Cigarette/Vaping    E-Cigarette Use Never User      E-Cigarette/Vaping Substances    Nicotine No     THC No     CBD No      Social History     Socioeconomic History    Marital status: Single     Spouse name: None    Number of children: None    Years of education: None    Highest education level: None   Occupational History    Occupation: disability   Social Needs    Financial resource strain: None    Food insecurity     Worry: None     Inability: None    Transportation needs     Medical: None     Non-medical: None   Tobacco Use    Smoking status: Former Smoker     Quit date: 2011     Years since quitting: 10 2    Smokeless tobacco: Never Used    Tobacco comment: marijuana   Substance and Sexual Activity    Alcohol use: No     Frequency: Never    Drug use: Yes     Types: Marijuana     Comment: marijuana daily    Sexual activity: Not Currently     Partners: Female, Male     Birth control/protection: None   Lifestyle    Physical activity     Days per week: None     Minutes per session: None    Stress: None   Relationships    Social connections     Talks on phone: None     Gets together: None     Attends Yazidi service: None     Active member of club or organization: None     Attends meetings of clubs or organizations: None     Relationship status: None    Intimate partner violence     Fear of current or ex partner: None     Emotionally abused: None     Physically abused: None     Forced sexual activity: None   Other Topics Concern    None   Social History Narrative    Social hx reviewed     No pref on Christian beliefs     Daily caffeine consumption 1 serving/day      Medications and Allergies:     Current Outpatient Medications   Medication Sig Dispense Refill    dexlansoprazole (Dexilant) 60 MG capsule Take 1 capsule (60 mg total) by mouth daily 30 capsule 3    albuterol (2 5 mg/3 mL) 0 083 % nebulizer solution Take 1 vial (2 5 mg total) by nebulization every 6 (six) hours as needed for wheezing or shortness of breath 150 mL 0    albuterol (PROVENTIL HFA,VENTOLIN HFA) 90 mcg/act inhaler Inhale 2 puffs every 6 (six) hours as needed for wheezing or shortness of breath 18 g 1    Alcohol Swabs (ALCOHOL PADS) 70 % PADS by Does not apply route 4 (four) times a day 400 each 3    amLODIPine (NORVASC) 5 mg tablet TAKE ONE TABLET BY MOUTH ONCE DAILY 90 tablet 2    amphetamine-dextroamphetamine (ADDERALL XR) 20 MG 24 hr capsule Take 1 capsule (20 mg total) by mouth 2 (two) times a dayMax Daily Amount: 40 mg 60 capsule 0    atorvastatin (LIPITOR) 20 mg tablet Take 1 tablet (20 mg total) by mouth daily 90 tablet 1    Blood Glucose Monitoring Suppl (FREESTYLE LITE) DEIRDRE by Does not apply route daily 1 each 0    buPROPion (WELLBUTRIN XL) 150 mg 24 hr tablet bupropion HCl  mg 24 hr tablet, extended release      buPROPion (WELLBUTRIN XL) 300 mg 24 hr tablet       conjugated estrogens (Premarin) vaginal cream INSERT 1 GRAM PER VAGINA DAILY AT BEDTIME  FOR TWO WEEKS AND THEN CHANGE TO 1 GRAM TWICE WEEKLY 30 g 2    cycloSPORINE (RESTASIS) 0 05 % ophthalmic emulsion Administer 1 drop to both eyes 2 (two) times a day      dicyclomine (BENTYL) 10 mg capsule       docusate sodium (COLACE) 100 mg capsule Take 1 capsule (100 mg total) by mouth 2 (two) times a day 180 capsule 1    Easy Comfort Lancets MISC USE TO TEST THE BLOOD SUGAR THREE TIMES A  each 10    FREESTYLE LITE test strip USE TO TEST THE BLOOD SUGAR THREE TIMES A  each 2    hydrocortisone 1 % cream Apply topically 4 (four) times a day as needed for irritation 56 g 1    insulin aspart (NovoLOG FlexPen) 100 UNIT/ML injection pen Inject 6 Units under the skin 3 (three) times a day with meals 15 mL 2    Insulin Pen Needle (PEN NEEDLES) 31G X 8 MM MISC Inject 1 Stick under the skin 4 (four) times a day (with meals and at bedtime) 100 each 6    lidocaine (XYLOCAINE) 5 % ointment APPLY TOPICALLY 2GM TWICE A DAY TO AFFECTED AREAS AS NEEDED FOR MILD PAIN 250 g 8    linaCLOtide (Linzess) 72 MCG CAPS Take 72 mcg by mouth daily 90 capsule 3    montelukast (SINGULAIR) 10 mg tablet TAKE ONE TABLET BY MOUTH ONCE DAILY AT BEDTIME 90 tablet 2    Movantik 25 MG tablet       Nutritional Supplements (Ensure Original) LIQD Take 1 Bottle by mouth 2 (two) times a day 60 Bottle 11    ondansetron (ZOFRAN) 4 mg tablet Take 1 tablet (4 mg total) by mouth every 8 (eight) hours as needed for nausea or vomiting 30 tablet 2    [START ON 4/10/2021] oxyCODONE-acetaminophen (PERCOCET)  mg per tablet Take 1 tablet by mouth every 6 (six) hours as needed for moderate painMax Daily Amount: 4 tablets 120 tablet 0    pantoprazole (PROTONIX) 40 mg tablet Take 1 tablet (40 mg total) by mouth 2 (two) times a day before meals 60 tablet 5    polyethylene glycol (GLYCOLAX) 17 GM/SCOOP powder DISSOLVE 17 GRAMS(ONE CAPFUL) IN 8 OUNCE OF WATER/JUICE & TAKE BY MOUTH DAILY AS DIRECTED *MAXIMUM ONE DOSE DAILY* 510 g 1    Probiotic Product (PROBIOTIC-10 PO) Take 1 tablet by mouth daily      QUEtiapine (SEROquel) 200 mg tablet Take 200 mg by mouth daily at bedtime      scopolamine (TRANSDERM-SCOP) 1 5 mg/3 days TD 72 hr patch Place 1 patch on the skin every third day 10 patch 3    sucralfate (CARAFATE) 1 g/10 mL suspension Take 10 mL (1 g total) by mouth 4 (four) times a day (with meals and at bedtime) 420 mL 1    TiZANidine (ZANAFLEX) 4 MG capsule Take 1 capsule (4 mg total) by mouth 3 (three) times a day 90 capsule 2    Toujeo Max SoloStar 300 units/mL CONCETRATED U-300 injection pen (2-unit dial) INJECT 22 UNITS UNDER THE SKIN DAILY 3 pen 1     No current facility-administered medications for this visit  Allergies   Allergen Reactions    Prozac [Fluoxetine Hcl]      SI    Bactrim [Sulfamethoxazole-Trimethoprim]      Pt "They think that is what cause the pancreatitis"     Lamictal [Lamotrigine] GI Intolerance    Lithium Other (See Comments)    Amlodipine Hives    Haldol [Haloperidol] Other (See Comments)     "I don't like it"    Ibuprofen     Lexapro [Escitalopram Oxalate] Rash    Navane [Thiothixene]      SI    Other      "novaine?" antipsychotic      Immunizations:     Immunization History   Administered Date(s) Administered    INFLUENZA 10/13/2008, 12/23/2010, 10/27/2011    Pneumococcal Conjugate 13-Valent 05/12/2015    Pneumococcal Polysaccharide PPV23 10/13/2008, 09/17/2015    Rabies 09/27/2007      Health Maintenance:         Topic Date Due    HIV Screening  Never done    Cervical Cancer Screening  Never done    Colorectal Cancer Screening  12/14/2028         Topic Date Due    COVID-19 Vaccine (1) Never done    DTaP,Tdap,and Td Vaccines (1 - Tdap) Never done      Medicare Screening Tests and Risk Assessments:     Jose Alejandro Ball is here for her Subsequent Wellness visit  Health Risk Assessment:   Patient rates overall health as fair  Patient feels that their physical health rating is slightly worse  Patient is satisfied with their life  Eyesight was rated as slightly worse  Hearing was rated as same  Patient feels that their emotional and mental health rating is same   Patients states they are never, rarely angry  Patient states they are always unusually tired/fatigued  Pain experienced in the last 7 days has been a lot  Patient's pain rating has been 10/10  Patient states that she has experienced no weight loss or gain in last 6 months  Depression Screening:   PHQ-2 Score: 0      Fall Risk Screening: In the past year, patient has experienced: history of falling in past year    Number of falls: 1  Injured during fall?: No    Feels unsteady when standing or walking?: Yes    Worried about falling?: Yes      Urinary Incontinence Screening:   Patient has leaked urine accidently in the last six months  Home Safety:  Patient has trouble with stairs inside or outside of their home  Patient has working smoke alarms and has working carbon monoxide detector  Home safety hazards include: none  Nutrition:   Current diet is Regular  Medications:   Patient is not currently taking any over-the-counter supplements  Patient is able to manage medications  Activities of Daily Living (ADLs)/Instrumental Activities of Daily Living (IADLs):   Walk and transfer into and out of bed and chair?: Yes  Dress and groom yourself?: Yes    Bathe or shower yourself?: Yes    Feed yourself?  Yes  Do your laundry/housekeeping?: No  Manage your money, pay your bills and track your expenses?: Yes  Make your own meals?: No    Do your own shopping?: No    Previous Hospitalizations:   Any hospitalizations or ED visits within the last 12 months?: Yes    How many hospitalizations have you had in the last year?: 1-2    Advance Care Planning:   Living will: No    Durable POA for healthcare: No    Advanced directive: No    Advanced directive counseling given: Yes    Five wishes given: Yes      Cognitive Screening:   Provider or family/friend/caregiver concerned regarding cognition?: No    PREVENTIVE SCREENINGS      Cardiovascular Screening:    General: Screening Not Indicated and History Lipid Disorder      Diabetes Screening: General: Screening Not Indicated and History Diabetes      Colorectal Cancer Screening:     General: Screening Current      Breast Cancer Screening:     General: Risks and Benefits Discussed    Due for: Mammogram        Cervical Cancer Screening:    General: Risks and Benefits Discussed    Due for: Cervical Pap Smear      Osteoporosis Screening:    General: Risks and Benefits Discussed      Abdominal Aortic Aneurysm (AAA) Screening:        General: Risks and Benefits Discussed and Screening Not Indicated      Lung Cancer Screening:     General: Screening Not Indicated      Hepatitis C Screening:    General: Risks and Benefits Discussed    Screening, Brief Intervention, and Referral to Treatment (SBIRT)    Screening  Typical number of drinks in a day: 0  Typical number of drinks in a week: 0  Interpretation: Low risk drinking behavior  AUDIT-C Screenin) How often did you have a drink containing alcohol in the past year? never  2) How many drinks did you have on a typical day when you were drinking in the past year? never  3) How often did you have 6 or more drinks on one occasion in the past year? never    AUDIT-C Score: 0  Interpretation: Score 0-2 (female): Negative screen for alcohol misuse    Single Item Drug Screening:  How often have you used an illegal drug (including marijuana) or a prescription medication for non-medical reasons in the past year? never    Single Item Drug Screen Score: 0  Interpretation: Negative screen for possible drug use disorder    Review of Current Opioid Use  Opioid Risk Tool (ORT) Score: 8  Opioid Risk Tool (ORT) Interpretation: Score 8+: High risk for opioid misuse    [place] or [place]  reviewed  No red flags were identified    Educational information on non-opioid treatment options provided    Other Counseling Topics:   Calcium and vitamin D intake         Juan Mcmillan PA-C

## 2021-03-29 NOTE — PATIENT INSTRUCTIONS
Medicare Preventive Visit Patient Instructions  Thank you for completing your Welcome to Medicare Visit or Medicare Annual Wellness Visit today  Your next wellness visit will be due in one year (3/30/2022)  The screening/preventive services that you may require over the next 5-10 years are detailed below  Some tests may not apply to you based off risk factors and/or age  Screening tests ordered at today's visit but not completed yet may show as past due  Also, please note that scanned in results may not display below  Preventive Screenings:  Service Recommendations Previous Testing/Comments   Colorectal Cancer Screening  * Colonoscopy    * Fecal Occult Blood Test (FOBT)/Fecal Immunochemical Test (FIT)  * Fecal DNA/Cologuard Test  * Flexible Sigmoidoscopy Age: 54-65 years old   Colonoscopy: every 10 years (may be performed more frequently if at higher risk)  OR  FOBT/FIT: every 1 year  OR  Cologuard: every 3 years  OR  Sigmoidoscopy: every 5 years  Screening may be recommended earlier than age 48 if at higher risk for colorectal cancer  Also, an individualized decision between you and your healthcare provider will decide whether screening between the ages of 74-80 would be appropriate  Colonoscopy: 12/14/2018  FOBT/FIT: Not on file  Cologuard: Not on file  Sigmoidoscopy: Not on file    Screening Current     Breast Cancer Screening Age: 36 years old  Frequency: every 1-2 years  Not required if history of left and right mastectomy Mammogram: Not on file        Cervical Cancer Screening Between the ages of 21-29, pap smear recommended once every 3 years  Between the ages of 33-67, can perform pap smear with HPV co-testing every 5 years     Recommendations may differ for women with a history of total hysterectomy, cervical cancer, or abnormal pap smears in past  Pap Smear: Not on file        Hepatitis C Screening Once for adults born between 1945 and 1965  More frequently in patients at high risk for Hepatitis C Hep C Antibody: Not on file        Diabetes Screening 1-2 times per year if you're at risk for diabetes or have pre-diabetes Fasting glucose: 99 mg/dL   A1C: 6 0    Screening Not Indicated  History Diabetes   Cholesterol Screening Once every 5 years if you don't have a lipid disorder  May order more often based on risk factors  Lipid panel: 02/17/2021    Screening Not Indicated  History Lipid Disorder     Other Preventive Screenings Covered by Medicare:  1  Abdominal Aortic Aneurysm (AAA) Screening: covered once if your at risk  You're considered to be at risk if you have a family history of AAA  2  Lung Cancer Screening: covers low dose CT scan once per year if you meet all of the following conditions: (1) Age 50-69; (2) No signs or symptoms of lung cancer; (3) Current smoker or have quit smoking within the last 15 years; (4) You have a tobacco smoking history of at least 30 pack years (packs per day multiplied by number of years you smoked); (5) You get a written order from a healthcare provider  3  Glaucoma Screening: covered annually if you're considered high risk: (1) You have diabetes OR (2) Family history of glaucoma OR (3)  aged 48 and older OR (3)  American aged 72 and older  3  Osteoporosis Screening: covered every 2 years if you meet one of the following conditions: (1) You're estrogen deficient and at risk for osteoporosis based off medical history and other findings; (2) Have a vertebral abnormality; (3) On glucocorticoid therapy for more than 3 months; (4) Have primary hyperparathyroidism; (5) On osteoporosis medications and need to assess response to drug therapy  · Last bone density test (DXA Scan): Not on file  5  HIV Screening: covered annually if you're between the age of 12-76  Also covered annually if you are younger than 13 and older than 72 with risk factors for HIV infection   For pregnant patients, it is covered up to 3 times per pregnancy  Immunizations:  Immunization Recommendations   Influenza Vaccine Annual influenza vaccination during flu season is recommended for all persons aged >= 6 months who do not have contraindications   Pneumococcal Vaccine (Prevnar and Pneumovax)  * Prevnar = PCV13  * Pneumovax = PPSV23   Adults 25-60 years old: 1-3 doses may be recommended based on certain risk factors  Adults 72 years old: Prevnar (PCV13) vaccine recommended followed by Pneumovax (PPSV23) vaccine  If already received PPSV23 since turning 65, then PCV13 recommended at least one year after PPSV23 dose  Hepatitis B Vaccine 3 dose series if at intermediate or high risk (ex: diabetes, end stage renal disease, liver disease)   Tetanus (Td) Vaccine - COST NOT COVERED BY MEDICARE PART B Following completion of primary series, a booster dose should be given every 10 years to maintain immunity against tetanus  Td may also be given as tetanus wound prophylaxis  Tdap Vaccine - COST NOT COVERED BY MEDICARE PART B Recommended at least once for all adults  For pregnant patients, recommended with each pregnancy  Shingles Vaccine (Shingrix) - COST NOT COVERED BY MEDICARE PART B  2 shot series recommended in those aged 48 and above     Health Maintenance Due:      Topic Date Due    HIV Screening  Never done    Cervical Cancer Screening  Never done    Colorectal Cancer Screening  12/14/2028     Immunizations Due:      Topic Date Due    COVID-19 Vaccine (1) Never done    DTaP,Tdap,and Td Vaccines (1 - Tdap) Never done     Advance Directives   What are advance directives? Advance directives are legal documents that state your wishes and plans for medical care  These plans are made ahead of time in case you lose your ability to make decisions for yourself  Advance directives can apply to any medical decision, such as the treatments you want, and if you want to donate organs  What are the types of advance directives?   There are many types of advance directives, and each state has rules about how to use them  You may choose a combination of any of the following:  · Living will: This is a written record of the treatment you want  You can also choose which treatments you do not want, which to limit, and which to stop at a certain time  This includes surgery, medicine, IV fluid, and tube feedings  · Durable power of  for healthcare [facility]): This is a written record that states who you want to make healthcare choices for you when you are unable to make them for yourself  This person, called a proxy, is usually a family member or a friend  You may choose more than 1 proxy  · Do not resuscitate (DNR) order:  A DNR order is used in case your heart stops beating or you stop breathing  It is a request not to have certain forms of treatment, such as CPR  A DNR order may be included in other types of advance directives  · Medical directive: This covers the care that you want if you are in a coma, near death, or unable to make decisions for yourself  You can list the treatments you want for each condition  Treatment may include pain medicine, surgery, blood transfusions, dialysis, IV or tube feedings, and a ventilator (breathing machine)  · Values history: This document has questions about your views, beliefs, and how you feel and think about life  This information can help others choose the care that you would choose  Why are advance directives important? An advance directive helps you control your care  Although spoken wishes may be used, it is better to have your wishes written down  Spoken wishes can be misunderstood, or not followed  Treatments may be given even if you do not want them  An advance directive may make it easier for your family to make difficult choices about your care  Urinary Incontinence   Urinary incontinence (UI)  is when you lose control of your bladder   UI develops because your bladder cannot store or empty urine properly  The 3 most common types of UI are stress incontinence, urge incontinence, or both  Medicines:   · May be given to help strengthen your bladder control  Report any side effects of medication to your healthcare provider  Do pelvic muscle exercises often:  Your pelvic muscles help you stop urinating  Squeeze these muscles tight for 5 seconds, then relax for 5 seconds  Gradually work up to squeezing for 10 seconds  Do 3 sets of 15 repetitions a day, or as directed  This will help strengthen your pelvic muscles and improve bladder control  Train your bladder:  Go to the bathroom at set times, such as every 2 hours, even if you do not feel the urge to go  You can also try to hold your urine when you feel the urge to go  For example, hold your urine for 5 minutes when you feel the urge to go  As that becomes easier, hold your urine for 10 minutes  Self-care:   · Keep a UI record  Write down how often you leak urine and how much you leak  Make a note of what you were doing when you leaked urine  · Drink liquids as directed  You may need to limit the amount of liquid you drink to help control your urine leakage  Do not drink any liquid right before you go to bed  Limit or do not have drinks that contain caffeine or alcohol  · Prevent constipation  Eat a variety of high-fiber foods  Good examples are high-fiber cereals, beans, vegetables, and whole-grain breads  Walking is the best way to trigger your intestines to have a bowel movement  · Exercise regularly and maintain a healthy weight  Weight loss and exercise will decrease pressure on your bladder and help you control your leakage  · Use a catheter as directed  to help empty your bladder  A catheter is a tiny, plastic tube that is put into your bladder to drain your urine  · Go to behavior therapy as directed  Behavior therapy may be used to help you learn to control your urge to urinate      Weight Management   Why it is important to manage your weight:  Being overweight increases your risk of health conditions such as heart disease, high blood pressure, type 2 diabetes, and certain types of cancer  It can also increase your risk for osteoarthritis, sleep apnea, and other respiratory problems  Aim for a slow, steady weight loss  Even a small amount of weight loss can lower your risk of health problems  How to lose weight safely:  A safe and healthy way to lose weight is to eat fewer calories and get regular exercise  You can lose up about 1 pound a week by decreasing the number of calories you eat by 500 calories each day  Healthy meal plan for weight management:  A healthy meal plan includes a variety of foods, contains fewer calories, and helps you stay healthy  A healthy meal plan includes the following:  · Eat whole-grain foods more often  A healthy meal plan should contain fiber  Fiber is the part of grains, fruits, and vegetables that is not broken down by your body  Whole-grain foods are healthy and provide extra fiber in your diet  Some examples of whole-grain foods are whole-wheat breads and pastas, oatmeal, brown rice, and bulgur  · Eat a variety of vegetables every day  Include dark, leafy greens such as spinach, kale, richard greens, and mustard greens  Eat yellow and orange vegetables such as carrots, sweet potatoes, and winter squash  · Eat a variety of fruits every day  Choose fresh or canned fruit (canned in its own juice or light syrup) instead of juice  Fruit juice has very little or no fiber  · Eat low-fat dairy foods  Drink fat-free (skim) milk or 1% milk  Eat fat-free yogurt and low-fat cottage cheese  Try low-fat cheeses such as mozzarella and other reduced-fat cheeses  · Choose meat and other protein foods that are low in fat  Choose beans or other legumes such as split peas or lentils  Choose fish, skinless poultry (chicken or turkey), or lean cuts of red meat (beef or pork)   Before you cook meat or poultry, cut off any visible fat  · Use less fat and oil  Try baking foods instead of frying them  Add less fat, such as margarine, sour cream, regular salad dressing and mayonnaise to foods  Eat fewer high-fat foods  Some examples of high-fat foods include french fries, doughnuts, ice cream, and cakes  · Eat fewer sweets  Limit foods and drinks that are high in sugar  This includes candy, cookies, regular soda, and sweetened drinks  Exercise:  Exercise at least 30 minutes per day on most days of the week  Some examples of exercise include walking, biking, dancing, and swimming  You can also fit in more physical activity by taking the stairs instead of the elevator or parking farther away from stores  Ask your healthcare provider about the best exercise plan for you  Narcotic (Opioid) Safety    Use narcotics safely:  · Take prescribed narcotics exactly as directed  · Do not give narcotics to others or take narcotics that belong to someone else  · Do not mix narcotics without medicines or alcohol  · Do not drive or operate heavy machinery after you take the narcotic  · Monitor for side effects and notify your healthcare provider if you experienced side effects such as nausea, sleepiness, itching, or trouble thinking clearly  Manage constipation:    Constipation is the most common side effect of narcotic medicine  Constipation is when you have hard, dry bowel movements, or you go longer than usual between bowel movements  Tell your healthcare provider about all changes in your bowel movements while you are taking narcotics  He or she may recommend laxative medicine to help you have a bowel movement  He or she may also change the kind of narcotic you are taking, or change when you take it  The following are more ways you can prevent or relieve constipation:    · Drink liquids as directed  You may need to drink extra liquids to help soften and move your bowels   Ask how much liquid to drink each day and which liquids are best for you   · Eat high-fiber foods  This may help decrease constipation by adding bulk to your bowel movements  High-fiber foods include fruits, vegetables, whole-grain breads and cereals, and beans  Your healthcare provider or dietitian can help you create a high-fiber meal plan  Your provider may also recommend a fiber supplement if you cannot get enough fiber from food  · Exercise regularly  Regular physical activity can help stimulate your intestines  Walking is a good exercise to prevent or relieve constipation  Ask which exercises are best for you  · Schedule a time each day to have a bowel movement  This may help train your body to have regular bowel movements  Bend forward while you are on the toilet to help move the bowel movement out  Sit on the toilet for at least 10 minutes, even if you do not have a bowel movement  Store narcotics safely:   · Store narcotics where others cannot easily get them  Keep them in a locked cabinet or secure area  Do not  keep them in a purse or other bag you carry with you  A person may be looking for something else and find the narcotics  · Make sure narcotics are stored out of the reach of children  A child can easily overdose on narcotics  Narcotics may look like candy to a small child  The best way to dispose of narcotics: The laws vary by country and area  In the [place], the best way is to return the narcotics through a take-back program  This program is offered by the [employer] ([employer])  The following are options for using the program:  · Take the narcotics to a BRITANY collection site  The site is often a law enforcement center  Call your local law enforcement center for scheduled take-back days in your area  You will be given information on where to go if the collection site is in a different location  · Take the narcotics to an approved pharmacy or hospital   A pharmacy or hospital may be set up as a collection site   You will need to ask if it is a BRITANY collection site if you were not directed there  A pharmacy or doctor's office may not be able to take back narcotics unless it is a BRITANY site  · Use a mail-back system  This means you are given containers to put the narcotics into  You will then mail them in the containers  · Use a take-back drop box  This is a place to leave the narcotics at any time  People and animals will not be able to get into the box  Your local law enforcement agency can tell you where to find a drop box in your area  Other ways to manage pain:   · Ask your healthcare provider about non-narcotic medicines to control pain  Nonprescription medicines include NSAIDs (such as ibuprofen) and acetaminophen  Prescription medicines include muscle relaxers, antidepressants, and steroids  · Pain may be managed without any medicines  Some ways to relieve pain include massage, aromatherapy, or meditation  Physical or occupational therapy may also help  For more information:   · Drug Enforcement Administration  [address] [place] [address] [address]  Phone: 5- 721 - 961-3095  Web Address: [facility]/drug_disposal/    · [address] and Drug Administration  [facility] [address] [facility]  Phone: 0- 465 - 623-3480  Web Address: http://[employer]/     © Copyright [employer] 2018 Information is for End User's use only and may not be sold, redistributed or otherwise used for commercial purposes   All illustrations and images included in CareNotes® are the copyrighted property of A D A M , Inc  or [address] [place] [address]

## 2021-03-29 NOTE — PROGRESS NOTES
Assessment/Plan:  - Patient notes she missed her most recent OBGYN appointment because it was raining heavily that day and she has difficulty transporting herself in her wheelchair when the weather is bad  Patient notes she did try to contact OBGYN office, but had the wrong number  Patient would like to reschedule her missed appointment  - Will send message to OBGYN explaining patient's current situation and that she would like to schedule a new appointment  ADHD  - Stable  Continue Adderall XR 20 mg twice daily  Gastroparesis/GERD   - Status post laparoscopic pyloroplasty with gastric stimulator placement on 06/22/2020 with [facility]    - Symptoms have much improved since patient started taking Dexilant  Continue Dexilant 60 mg once daily as prescribed through Gastroenterology  - Advised to follow-up with gastroenterology as scheduled  - Advised to follow-up with [facility] as scheduled  - Patient has been receiving Boost but prefers Ensure  Will resend prescription for Ensure original, to drink 1 bottle twice daily  Constipation  - Symptoms have much improved since taking Linzess  Continue taking Linzess 72 mcg once daily as prescribed through Gastroenterology  - Continue follow-up with gastroenterology as scheduled  Recurrent vaginal yeast infection/Rash   - Currently asymptomatic   - Advised to follow-up with OBGYN for further evaluation  Type 2 diabetes mellitus   - Last HgbA1c from February 2021 was 6 0    - Continue NovoLog 6 units, 3 times daily with meals and Toujeo 22 units once daily  Weakness in lower extremities/cataplexy/Wegener's granulomatosis/small fiber neuropathy   - Patient was seen by [facility] services and was provided some stretches and exercises that she should perform daily  Patient notes she is unable to perform many of them due to pain   Patient ultimately would like OMT since that has helped her in the past   - Advised to follow-up with Neurology as scheduled in April 2021    - Patient is currently prescribed oxycodone-acetaminophen 7 5-325 mg, every 4 hours as needed  However this regimen is no longer effective for patient     - PDMP reviewed  No red flags noted  - Will increase patient's regimen to oxycodone-acetaminophen  mg, every 6 hours as needed for pain  - Patient has naloxone prescription, to be used if needed  Depression Screening Follow-up Plan: Patient's depression screening was positive with a PHQ-2 score of 0  Their PHQ-9 score was not indicated  Clinically patient does not have depression  No treatment is required  Diagnoses and all orders for this visit:    Medicare annual wellness visit, subsequent    Depression screen    Gastroparesis    Gastroesophageal reflux disease, unspecified whether esophagitis present    Recurrent candidiasis of vagina    Type 2 diabetes mellitus with stage 4 chronic kidney disease, unspecified whether long term insulin use (HCC)    Weakness of both lower extremities    Granulomatosis with polyangiitis with renal involvement ([facility] [address] )  -     oxyCODONE-acetaminophen (PERCOCET)  mg per tablet; Take 1 tablet by mouth every 6 (six) hours as needed for moderate painMax Daily Amount: 4 tablets    Small fiber neuropathy    Cataplexy    Attention deficit hyperactivity disorder (ADHD), unspecified ADHD type    Constipation, unspecified constipation type    Chronic pain syndrome  -     oxyCODONE-acetaminophen (PERCOCET)  mg per tablet; Take 1 tablet by mouth every 6 (six) hours as needed for moderate painMax Daily Amount: 4 tablets          All of patients questions were answered  Patient understands and agrees with the above plan  Return in about 3 months (around 6/29/2021) for Next scheduled follow up back pain, reflux      Justyna Nolan PA-C  03/29/21  FP [facility]           Subjective:     Patient ID: José Antonio Lara  is a 48 y o  female  has a past medical history of ADHD, Anemia of chronic disease, Anxiety, Asthma, Asthma, Borderline personality disorder ([facility] ), Cataplexy, Chronic abdominal pain, CKD (chronic kidney disease) stage 3, GFR 30-59 ml/min, 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[facility] ), Cushing syndrome ([facility] ), Diabetes mellitus ([facility] ), DVT (deep venous thrombosis) ([place] [address] ), GERD (gastroesophageal reflux disease), History of acute pancreatitis, History of transfusion, HTN (hypertension), Hypertension, Liver disease, Microscopic polyangiitis ([facility] ), Morbid obesity ([place] [address] ), MPA (microscopic polyangiitis) ([place] [address] ), Ovarian cyst, PTSD (post-traumatic stress disorder), Renal disorder, Self-inflicted injury, and Wegener's granulomatosis with renal involvement ([place] [address] )  She also has no past medical history of Hyperlipidemia  who presents today in office for AWV  And follow-up of chronic conditions  - Patient is a 48 y o  female who presents today for AWV and follow-up of chronic conditions  Gastroparesis/ GERD:  Patient saw Gastroenterology for follow-up visit recently and was switched from Protonix to [address] [place] [address]  Patient notes her symptoms have much improved since starting the new medication  Patient notes she had been receiving boost and drinking 2 bottles a day  However, patient prefers prescription for Ensure  Constipation:  Patient saw Gastroenterology for follow-up visit recently and was switched to Lindalee Phuong  Patient notes her symptoms have much improved when taking this medication  ADHD: Currently taking adderall XR 20 mg twice daily  Recurrent vaginal yeast infection: Patient notes her symptoms have now finally resolved  Patient had an appointment scheduled with OBGYN earlier this month, but patient was unable to attend due to bad weather  Patient would like to reschedule his appointment  Type 2 diabetes mellitus:  Currently taking NovoLog 6 units, 3 times daily with meals and Toujeo 22 units once daily      Weakness in lower extremities/cataplexy/Wegener's granulomatosis/small fiber neuropathy:   Patient notes [facility] services did come to her house for physical therapy  However, patient notes she has been discharged  Patient notes they did show her some stretches and exercises that she could perform on her own, but patient notes she is unable to do most of them due to pain  Patient notes the main thing she would like is OMT but she does not think they would come to her house for that     patient is currently prescribed oxycodone-acetaminophen 7 5-325 mg, to be taken every 4 hours as needed for pain  Patient notes recently she has been taking the medication every 4 hours  Patient notes previously the medication was effective, but now the medication wears off after about 3 hours  Patient notes she has tried taking 1 5 tablets and that has been able to control her pain  Patient notes the area where she has the most pain is on the left side of her back  Patient is scheduled to be seen by Neurology next month  Patient notes she is also trying to obtain 3 more hours for home healthcare services  Patient notes currently the caregiver please at 8:00 p m , but then that limits the patient because she still gets up and moves around the house until around 11:00 p m  Jonah Tomi Patient notes that is in dangerous for her to be by herself and moving without guidance  Patient notes she does use lidocaine patches and heating pad for her pain as well  - Patient notes she is unsure she should receive the COVID-19 vaccine  Patient notes she was told in the past that she should not receive the influenza vaccine, but she is able to receive pneumonia vaccine  The following portions of the patient's history were reviewed and updated as appropriate: allergies, current medications, past family history, past medical history, past social history, past surgical history and problem list         Review of Systems   Constitutional: Negative for chills, fatigue and fever     HENT: Negative for congestion and sore throat  Eyes: Negative for pain and visual disturbance  Respiratory: Negative for cough, chest tightness and shortness of breath  Cardiovascular: Negative for chest pain and palpitations  Gastrointestinal: Positive for abdominal pain (improving) and constipation (improving)  Negative for diarrhea  Genitourinary: Negative for difficulty urinating and dysuria  Musculoskeletal: Positive for arthralgias, gait problem and myalgias  Neurological: Negative for dizziness and headaches  Psychiatric/Behavioral: The patient is not nervous/anxious  Objective:   Vitals:    03/29/21 0959   BP: 120/84   BP Location: Left arm   Patient Position: Sitting   Cuff Size: Standard   Pulse: 80   Resp: 20   Temp: (!) 97 3 °F (36 3 °C)   TempSrc: Temporal   SpO2: 97%   Weight: 75 5 kg (166 lb 6 4 oz)   Height: 5' 2" (1 575 m)         Physical Exam  Vitals signs and nursing note reviewed  Constitutional:       Appearance: She is well-developed  Comments: Examined in wheelchair  HENT:      Head: Normocephalic and atraumatic  Right Ear: External ear normal       Left Ear: External ear normal       Nose: Nose normal    Eyes:      Conjunctiva/sclera: Conjunctivae normal    Neck:      Musculoskeletal: Normal range of motion and neck supple  Cardiovascular:      Rate and Rhythm: Normal rate and regular rhythm  Heart sounds: Normal heart sounds  No murmur  Pulmonary:      Effort: Pulmonary effort is normal       Breath sounds: Normal breath sounds  No wheezing  Skin:     General: Skin is warm and dry  Neurological:      Mental Status: She is alert and oriented to person, place, and time  Motor: Abnormal muscle tone present             PHQ-9 Depression Screening    PHQ-9:   Frequency of the following problems over the past two weeks:      Little interest or pleasure in doing things: 0 - not at all  Feeling down, depressed, or hopeless: 0 - not at all  PHQ-2 Score: 0

## 2021-03-30 ENCOUNTER — TELEPHONE (OUTPATIENT)
Dept: OBGYN CLINIC | Facility: CLINIC | Age: 51
End: 2021-03-30

## 2021-03-30 DIAGNOSIS — E11.9 TYPE 2 DIABETES MELLITUS WITHOUT COMPLICATION, WITH LONG-TERM CURRENT USE OF INSULIN (HCC): ICD-10-CM

## 2021-03-30 DIAGNOSIS — Z79.4 TYPE 2 DIABETES MELLITUS WITHOUT COMPLICATION, WITH LONG-TERM CURRENT USE OF INSULIN (HCC): ICD-10-CM

## 2021-03-30 NOTE — TELEPHONE ENCOUNTER
----- Message from Nya Nicole sent at 3/29/2021  3:29 PM EDT -----  Regarding: FW: Patient missed appointment  Please reach out to this patient and assist her with rescheduling her 3/18/21 appointment with me  Thanks!  ----- Message -----  From: Bhupinder Lino PA-C  Sent: 3/29/2021   1:58 PM EDT  To: PRASANTH Nicole  Subject: Patient missed appointment                       Jose Stanton,    I saw this patient today in office and she had a visit scheduled with you on 3/18/21, however she was unable to attend  Patient notes it was raining heavily that day and she has difficulty transporting herself in her wheelchair when it is raining hard  Patient notes she did try to contact your office, but she had the wrong number  Patient would like to reschedule her missed appointment  Thanks!     Katarznya

## 2021-04-01 RX ORDER — BLOOD-GLUCOSE METER
KIT MISCELLANEOUS
Qty: 100 EACH | Refills: 5 | Status: SHIPPED | OUTPATIENT
Start: 2021-04-01 | End: 2021-09-16

## 2021-04-04 NOTE — PROGRESS NOTES
Patient ID: Karyn Lee is a 48 y o  female  Assessment/Plan:   alteration in hearing  Patient presents today with primary complaint of issues with alteration in her hearing  This has been present for several years however increased over the past years, when present effects her whole body  Etiology unclear  --patient had MRI of the brain in June 2019, no masses noted, no acute infarction, nonspecific periventricular and subcortical white matter were noted, early microvascular changes  Unfortunately, patient now has a abdominal stimulator, can no longer obtain MRIs  --at this time, patient needs to be followed up by ENT        lower extremity weakness/neuropathy   patient noted with mild acral sensory loss, likely secondary to underlying small fiber neuropathy which was supported by abnormal skin biopsy  Etiology most likely secondary to diabetes with contributing factor from her CKD as well as some psychological factors, hip pain  For the most part, her symptoms are clinically stable, denies any significant burning dysesthesias  Patient has a clinical exam with fairly  preserved strength lower extremities  --EMG in the past without evidence of large fiber neuropathy or radiculopathy  If increased symptoms, could consider repeating EMG   --MRI of the lower back with only minimal degenerative disc disease, no significant neural foraminal narrowing or canal stenosis  -- patient never completed MRI cervical spine, unfortunately, now able to carry through secondary to abdominal spinal cord stimulator  -- patient off of both gabapentin and tizanidine, at this point does not require pharmacological management from us  She is on Oxycodone from her PCP  -- encouraged good blood sugar control, hemoglobin  A1c controlled  -- fall precautions  --patient previously received PT, she has since been discharged  States she is unable to do most of the exercises secondary to pain    --patient following with podiatrist     alteration in smell, etiology unclear   patient describes progressive discal T alteration in her smell to the point where she is hypersensitive causing additional issues with nausea, vomiting  Etiology unclear  MRI of the brain without any changes, nonspecific white matter foci noted frontal lobes, no pathology or evidence of mass olfactory region  Fortunately, at this time, issues with her smell seem to have improved, comes and goes, does not appear to be a significant issue at this point  --  Patient was seen by ENT, no significant issues noted with her smell noted  -- patient unable to tolerate nose sprays      No problem-specific Assessment & Plan notes found for this encounter  Diagnoses and all orders for this visit:    Hearing difficulty of both ears  -     Ambulatory Referral to Otolaryngology; Future    Neuropathy  -     Ambulatory referral to Neurology    Weakness of both lower extremities  -     Ambulatory referral to Neurology           Subjective:    RODNEY Berger is a 66-year-old female who presents today for neurologic follow-up for complaints of paresthesias in lower extremity weakness    Below HPI maintain in patient's chart secondary to complexity of case and for review as previously documented by myself    Symptoms began approximately 5 years ago, started as tingling in the back of her head on the left side   It was only intermittent   Has been increasing   It occurs randomly to include her face, either side of her head, forehead   It used to last a few seconds however now lasts up to a minute   She has associated headaches described as tension type, pressure sensation beginning in the back, behind her eyes   No nausea or vomiting   These only occur approximately 4 times a year  Billlogan Alcaraz usually respond to rest    She has osteoarthritis of the right hip, she takes Percocet, as well as gabapentin 300 mg t i d  which was started for post herpetic neuralgia 3 years ago  Elliott gatica reports a history of cataplexy  Antonio Calix was diagnosed with conversion disorder in the past, with episodic symptoms of falling, not being able to breathe, questionable seizure-like episodes   These have improved   She is unable to ambulate however and mostly in a wheelchair because her legs intermittently get completely numb and weak due to her cataplexy   EMG in the past, 2017  lower extremities was normal, repeat done October 2018 once again normal   She had a skin biopsy done in the past from a bilateral calves, which demonstrated decreased epidermal nerve fiber density consistent with small fiber neuropathy   No evidence of vasculitis or amyloid was seen   No family history of vasculitis  She underwent lab work, Anca was normal, thyroid normal, B6, B12 normal   Last hemoglobin A1c noted at 6 3, she continues on insulin  She is followed by Nephrology, CKD stable and at baseline  She has a diagnosis of Wegener's granulomatosis diagnosed in 2014, diabetes, GERD, asthma  She is not on any immunotherapy, her previous ankle levels were normal  Patient had MRI of the brain, no acute pathology, stable nonspecific white matter, no lesions, evidence of early chronic small vessel ischemic changes  She was seen by ENT, unable to tolerate any type of nasal sprays  Patient was last seen in October 2019  At which point she continued to describe numbness in her feet bilaterally, chronic shooting pain into her lower extremities, groin and perineum  She was on gabapentin and Zanaflex  Patient was on Percocet from her PCP  At that time, was suggested she undergo PT as well as MRI of the cervical spine,  Which was not completed  Today,  Patient presents with her aide  Fortunately, prior issues with her smell at this point come and go, do not to seem to be a significant issue  She typically puts some Vicks under her nose which will provide her with relief    She continues to note numbness and tingling in her lower extremities bilaterally, she does have a known neuropathy  Patient is seen by a podiatrist   Her blood sugars are running 120-130  At this time, she is off of gabapentin and Zanaflex, felt both agents made her sleepy and were of little benefit  In speaking with her, she denies any burning dysesthesias, patient is on Oxy code own from her PCP     In speaking with her, she does not feel she needs any further pharmacological management  Duglas Meneses Unfortunately she has had interim falls which she relates to some imbalance, and episodes of cataplexy  These have not required any type of neurologic follow-up  Patient presents today however for neurologic evaluation of complaints of sensitivity to sounds, she relates that certain tones attack her body at which time she states her body shuts down, she is unable to function  In speaking with her this has been present for several years however more prominent over the past year  It is extremely annoying for her, and seems to be more frequent  She is unable to provide any other specific information  As she had MRI in the past for her issues with smell which were unrevealing, she saw ENT several years ago for her smell, not specifically for her current complaints of her hearing  the interim, patient hospitalized back in July 2020 and again  November 2020 noted to have elevated lipase,  Mild pancreatitis  CT abdomen negative  Patient being followed by GI    Patient noted with ongoing issues with CKD, needs to follow with nephrologist   Recent labs with elevated lipids, patient to follow-up with PCP       labs; lipase = 242, TSH = 1 204,  Lyme antibody negative,  Hemoglobin A1c = 6 0, CBC normal, BUN = 41, creatinine = 2 13,  Alkaline phosphatase = 164,  GFR = 26, CH = 285, triglycerides = 552,  HDL = 41,       The following portions of the patient's history were reviewed and updated as appropriate: allergies, current medications, past family history, past medical history, past social history, past surgical history and problem list          Objective:  Current Outpatient Medications   Medication Sig Dispense Refill    albuterol (2 5 mg/3 mL) 0 083 % nebulizer solution Take 1 vial (2 5 mg total) by nebulization every 6 (six) hours as needed for wheezing or shortness of breath 150 mL 0    albuterol (PROVENTIL HFA,VENTOLIN HFA) 90 mcg/act inhaler Inhale 2 puffs every 6 (six) hours as needed for wheezing or shortness of breath 18 g 1    Alcohol Swabs (ALCOHOL PADS) 70 % PADS by Does not apply route 4 (four) times a day 400 each 3    amLODIPine (NORVASC) 5 mg tablet TAKE ONE TABLET BY MOUTH ONCE DAILY 90 tablet 2    amphetamine-dextroamphetamine (ADDERALL XR) 20 MG 24 hr capsule Take 1 capsule (20 mg total) by mouth 2 (two) times a dayMax Daily Amount: 40 mg 60 capsule 0    atorvastatin (LIPITOR) 20 mg tablet Take 1 tablet (20 mg total) by mouth daily 90 tablet 1    Blood Glucose Monitoring Suppl (FREESTYLE LITE) DEIRDRE by Does not apply route daily 1 each 0    buPROPion (WELLBUTRIN XL) 300 mg 24 hr tablet Take 300 mg by mouth every morning       conjugated estrogens (Premarin) vaginal cream INSERT 1 GRAM PER VAGINA DAILY AT BEDTIME  FOR TWO WEEKS AND THEN CHANGE TO 1 GRAM TWICE WEEKLY 30 g 2    cycloSPORINE (RESTASIS) 0 05 % ophthalmic emulsion Administer 1 drop to both eyes 2 (two) times a day      dexlansoprazole (Dexilant) 60 MG capsule Take 1 capsule (60 mg total) by mouth daily 30 capsule 3    dicyclomine (BENTYL) 10 mg capsule       docusate sodium (COLACE) 100 mg capsule Take 1 capsule (100 mg total) by mouth 2 (two) times a day 180 capsule 1    Easy Comfort Lancets MISC USE TO TEST THE BLOOD SUGAR THREE TIMES A  each 10    FREESTYLE LITE test strip USE TO TEST THE BLOOD SUGAR THREE TIMES A  each 5    hydrocortisone 1 % cream Apply topically 4 (four) times a day as needed for irritation 56 g 1    insulin aspart (NovoLOG FlexPen) 100 UNIT/ML injection pen Inject 6 Units under the skin 3 (three) times a day with meals 15 mL 2    Insulin Pen Needle (PEN NEEDLES) 31G X 8 MM MISC Inject 1 Stick under the skin 4 (four) times a day (with meals and at bedtime) 100 each 6    lidocaine (XYLOCAINE) 5 % ointment APPLY TOPICALLY 2GM TWICE A DAY TO AFFECTED AREAS AS NEEDED FOR MILD PAIN 250 g 8    linaCLOtide (Linzess) 72 MCG CAPS Take 72 mcg by mouth daily 90 capsule 3    Movantik 25 MG tablet       Nutritional Supplements (Ensure Original) LIQD Take 1 Bottle by mouth 2 (two) times a day 60 Bottle 11    ondansetron (ZOFRAN) 4 mg tablet Take 1 tablet (4 mg total) by mouth every 8 (eight) hours as needed for nausea or vomiting 30 tablet 2    [START ON 4/10/2021] oxyCODONE-acetaminophen (PERCOCET)  mg per tablet Take 1 tablet by mouth every 6 (six) hours as needed for moderate painMax Daily Amount: 4 tablets 120 tablet 0    pantoprazole (PROTONIX) 40 mg tablet Take 1 tablet (40 mg total) by mouth 2 (two) times a day before meals 60 tablet 5    polyethylene glycol (GLYCOLAX) 17 GM/SCOOP powder DISSOLVE 17 GRAMS(ONE CAPFUL) IN 8 OUNCE OF WATER/JUICE & TAKE BY MOUTH DAILY AS DIRECTED *MAXIMUM ONE DOSE DAILY* 510 g 1    Probiotic Product (PROBIOTIC-10 PO) Take 1 tablet by mouth daily      QUEtiapine (SEROquel) 200 mg tablet Take 200 mg by mouth daily at bedtime      scopolamine (TRANSDERM-SCOP) 1 5 mg/3 days TD 72 hr patch Place 1 patch on the skin every third day 10 patch 3    sucralfate (CARAFATE) 1 g/10 mL suspension Take 10 mL (1 g total) by mouth 4 (four) times a day (with meals and at bedtime) 420 mL 1    TiZANidine (ZANAFLEX) 4 MG capsule Take 1 capsule (4 mg total) by mouth 3 (three) times a day 90 capsule 2    Toujeo Max SoloStar 300 units/mL CONCETRATED U-300 injection pen (2-unit dial) INJECT 22 UNITS UNDER THE SKIN DAILY 3 pen 1    buPROPion (WELLBUTRIN XL) 150 mg 24 hr tablet bupropion HCl  mg 24 hr tablet, extended release      montelukast (SINGULAIR) 10 mg tablet TAKE ONE TABLET BY MOUTH ONCE DAILY AT BEDTIME (Patient not taking: Reported on 4/5/2021) 90 tablet 2     No current facility-administered medications for this visit  Blood pressure 126/74, pulse 71, resp  rate 18, height 5' 2" (1 575 m), weight 72 6 kg (160 lb), not currently breastfeeding  Physical Exam  Constitutional:       Appearance: Normal appearance  HENT:      Head: Normocephalic  Eyes:      Extraocular Movements: Extraocular movements intact  Pupils: Pupils are equal, round, and reactive to light  Neck:      Musculoskeletal: Muscular tenderness present  Cardiovascular:      Rate and Rhythm: Normal rate  Pulses: Normal pulses  Pulmonary:      Effort: Pulmonary effort is normal    Musculoskeletal:         General: Tenderness present  Neurological:      Deep Tendon Reflexes:      Reflex Scores:       Tricep reflexes are 2+ on the right side and 2+ on the left side  Brachioradialis reflexes are 2+ on the right side and 2+ on the left side  Patellar reflexes are 1+ on the right side  Achilles reflexes are 0 on the left side  Psychiatric:         Mood and Affect: Mood is depressed  Affect is blunt  Speech: Speech normal          Behavior: Behavior normal          Cognition and Memory: Memory is impaired  Neurological Exam  Mental Status  Awake, alert and oriented to person, place and time  Speech is normal  Language is fluent with no aphasia  Cranial Nerves  CN III, IV, VI: Extraocular movements intact bilaterally  Pupils equal round and reactive to light bilaterally  CN V: Facial sensation is normal   CN VII: Full and symmetric facial movement  CN VIII:  Right: Hearing is decreased  Shaw lateralizes left  CN XI: Shoulder shrug strength is normal   CN XII: Tongue midline without atrophy or fasciculations  Motor  Normal muscle bulk throughout  Normal muscle tone  No abnormal involuntary movements  Strength is 5/5 in all four extremities except as noted  Patient with mild left hip flexor weakness secondary to complaints of increased low back pain  Sensory  Temperature abnormality: Temperature in tact on the left, decreased above ankle on right  Vibration abnormality: Patient with distal sensory loss lower extremities, 4 seconds maximal strike left malleolus, 5 seconds right malleolus  Decreased vibratory perception right upper extremity versus left as well as left lower extremity  Reflexes                                           Right                      Left  Brachioradialis                    2+                         2+  Triceps                                2+                         2+  Patellar                                1+                         Tr  Achilles                                Tr                         0    Coordination  Right: Finger-to-nose normal   Left: Finger-to-nose normal     Gait  Patient presented today in a wheelchair, no attempts were made to have her ambulate  ROS:  Personally reviewed with patient    Review of Systems   Constitutional: Positive for appetite change and fatigue  Negative for fever  HENT: Positive for tinnitus (bilateral ears)  Negative for hearing loss, trouble swallowing and voice change  Eyes: Negative  Negative for photophobia and pain  Respiratory: Positive for shortness of breath  Cardiovascular: Positive for palpitations  Gastrointestinal: Positive for constipation, diarrhea, nausea and vomiting  Endocrine: Negative  Negative for cold intolerance  Genitourinary: Positive for frequency and urgency  Negative for dysuria  Musculoskeletal: Negative  Negative for myalgias and neck pain  Skin: Negative  Negative for rash  Allergic/Immunologic: Negative  Neurological: Positive for dizziness, speech difficulty (at times), weakness (whole body), light-headedness and headaches   Negative for tremors, seizures, syncope, facial asymmetry and numbness  Hematological: Bruises/bleeds easily  Psychiatric/Behavioral: Positive for agitation, behavioral problems, confusion and sleep disturbance  Negative for hallucinations  The patient is nervous/anxious  Depression, anxiety and mood swings   Memory issues

## 2021-04-05 ENCOUNTER — OFFICE VISIT (OUTPATIENT)
Dept: NEUROLOGY | Facility: CLINIC | Age: 51
End: 2021-04-05
Payer: COMMERCIAL

## 2021-04-05 VITALS
WEIGHT: 160 LBS | SYSTOLIC BLOOD PRESSURE: 126 MMHG | BODY MASS INDEX: 29.44 KG/M2 | RESPIRATION RATE: 18 BRPM | DIASTOLIC BLOOD PRESSURE: 74 MMHG | HEART RATE: 71 BPM | HEIGHT: 62 IN

## 2021-04-05 DIAGNOSIS — H91.93 HEARING DIFFICULTY OF BOTH EARS: Primary | ICD-10-CM

## 2021-04-05 DIAGNOSIS — G62.9 NEUROPATHY: ICD-10-CM

## 2021-04-05 DIAGNOSIS — R29.898 WEAKNESS OF BOTH LOWER EXTREMITIES: ICD-10-CM

## 2021-04-05 PROCEDURE — 3008F BODY MASS INDEX DOCD: CPT | Performed by: NURSE PRACTITIONER

## 2021-04-05 PROCEDURE — 3008F BODY MASS INDEX DOCD: CPT | Performed by: INTERNAL MEDICINE

## 2021-04-05 PROCEDURE — 99214 OFFICE O/P EST MOD 30 MIN: CPT | Performed by: NURSE PRACTITIONER

## 2021-04-05 PROCEDURE — 1036F TOBACCO NON-USER: CPT | Performed by: NURSE PRACTITIONER

## 2021-04-05 NOTE — PROGRESS NOTES
Review of Systems   Constitutional: Positive for appetite change and fatigue  Negative for fever  HENT: Positive for tinnitus (bilateral ears)  Negative for hearing loss, trouble swallowing and voice change  Eyes: Negative  Negative for photophobia and pain  Respiratory: Positive for shortness of breath  Cardiovascular: Positive for palpitations  Gastrointestinal: Positive for constipation, diarrhea, nausea and vomiting  Endocrine: Negative  Negative for cold intolerance  Genitourinary: Positive for frequency and urgency  Negative for dysuria  Musculoskeletal: Negative  Negative for myalgias and neck pain  Skin: Negative  Negative for rash  Allergic/Immunologic: Negative  Neurological: Positive for dizziness, speech difficulty (at times), weakness (whole body), light-headedness and headaches  Negative for tremors, seizures, syncope, facial asymmetry and numbness  Hematological: Bruises/bleeds easily  Psychiatric/Behavioral: Positive for agitation, behavioral problems, confusion and sleep disturbance  Negative for hallucinations  The patient is nervous/anxious  Depression, anxiety and mood swings   Memory issues

## 2021-04-05 NOTE — PATIENT INSTRUCTIONS
Will refer patient to ENT  Encouraged good blood sugar control  Patient following with Podiatry  Patient following with her PCP  Discussed balance precautions

## 2021-04-06 ENCOUNTER — TELEPHONE (OUTPATIENT)
Dept: OBGYN CLINIC | Facility: CLINIC | Age: 51
End: 2021-04-06

## 2021-04-06 ENCOUNTER — OFFICE VISIT (OUTPATIENT)
Dept: OBGYN CLINIC | Facility: CLINIC | Age: 51
End: 2021-04-06

## 2021-04-06 VITALS
DIASTOLIC BLOOD PRESSURE: 86 MMHG | WEIGHT: 160 LBS | SYSTOLIC BLOOD PRESSURE: 133 MMHG | HEART RATE: 82 BPM | BODY MASS INDEX: 29.26 KG/M2

## 2021-04-06 DIAGNOSIS — N95.2 VAGINAL ATROPHY: Primary | ICD-10-CM

## 2021-04-06 PROCEDURE — 99213 OFFICE O/P EST LOW 20 MIN: CPT | Performed by: OBSTETRICS & GYNECOLOGY

## 2021-04-06 RX ORDER — CONJUGATED ESTROGENS 0.62 MG/G
CREAM VAGINAL
Qty: 30 G | Refills: 0 | Status: SHIPPED | OUTPATIENT
Start: 2021-04-06 | End: 2021-05-01 | Stop reason: HOSPADM

## 2021-04-06 RX ORDER — ESTRADIOL 0.1 MG/G
2 CREAM VAGINAL DAILY
Qty: 42.5 G | Refills: 1 | Status: SHIPPED | OUTPATIENT
Start: 2021-04-06 | End: 2021-04-27 | Stop reason: HOSPADM

## 2021-04-06 NOTE — ASSESSMENT & PLAN NOTE
- Postmenopausal changes seen in vagina  - Estrace cream prescribed with instructions to use every day for 10-14 days, then taper down to 2-3x/week  - Discussed utilizing water based lubricants during sexual activities   She states she has sex about 1x/year  - Discussed low risk of endometrial/breast cancer with topical estrogen, but if she does have vaginal bleeding she should call us to be examined

## 2021-04-06 NOTE — TELEPHONE ENCOUNTER
Pharmacy called and reports that Estrace vaginal cream is not covered  Provider changed rx to Premarin vaginal cream   Patient notified

## 2021-04-06 NOTE — PROGRESS NOTES
OB/GYN VISIT  Lupe Espinoza  2021  10:33 AM    Subjective:     Lupe Espinoza is a 48 y o   female who presents for vaginal dryness and pain with sex  She has sexual intercourse about one time a year  Denies changes to vulva, feels the dryness/itching is "inside"  Menstrual History:  OB History        0    Para   0    Term   0       0    AB   0    Living   0       SAB   0    TAB   0    Ectopic   0    Multiple   0    Live Births   0                  No LMP recorded (lmp unknown)  Patient is postmenopausal        Past Medical History:   Diagnosis Date    ADHD     Anemia of chronic disease     Anxiety     Asthma     Asthma     Borderline personality disorder ([facility] [address] [facility]     Cataplexy     Chronic abdominal pain     CKD (chronic kidney disease) stage 3, GFR 30-59 ml/min     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[facility] [address] [facility]     Cushing syndrome ([facility] [address] [facility]     Diabetes mellitus ([facility] [address] [facility]     DVT (deep venous thrombosis) (HCC)     GERD (gastroesophageal reflux disease)     History of acute pancreatitis     felt secondary to Bactrim    History of transfusion     HTN (hypertension)     Hypertension     Liver disease     fatty liver    Microscopic polyangiitis (HCC)     Morbid obesity (HCC)     MPA (microscopic polyangiitis) (HCC)     Ovarian cyst     PTSD (post-traumatic stress disorder)     Renal disorder     Self-inflicted injury     self inflicted skin wounds    Wegener's granulomatosis with renal involvement ([facility] [address] )      Past Surgical History:   Procedure Laterality Date    COLONOSCOPY      ESOPHAGOGASTRODUODENOSCOPY  2015    mild antral gastritis    GASTRIC STIMULATOR IMPLANT SURGERY  2020    AK COLONOSCOPY FLX DX W/COLLJ SPEC WHEN PFRMD N/A 2018    Procedure: COLONOSCOPY with polypectomy;  Surgeon: Jose E Sandra MD;  Location: [facility];   Service: Gastroenterology    AK ESOPHAGOGASTRODUODENOSCOPY TRANSORAL DIAGNOSTIC N/A 2018    Procedure: ESOPHAGOGASTRODUODENOSCOPY (EGD) with biopsy;  Surgeon: Sanaz Chowdhury MD;  Location: [facility]; Service: Gastroenterology    RELEASE SCAR CONTRACTURE / GRAFT REPAIRS OF HAND Bilateral     UPPER GASTROINTESTINAL ENDOSCOPY  12/26/2019       Objective:    Vitals: Blood pressure 133/86, pulse 82, weight 72 6 kg (160 lb), not currently breastfeeding  Body mass index is 29 26 kg/m²  Physical Exam  Vitals signs reviewed  Constitutional:       Appearance: Normal appearance  She is normal weight  Abdominal:      General: There is no distension  Palpations: There is no mass  Genitourinary:     General: Normal vulva  Vagina: No vaginal discharge  Rectum: Normal       Comments: Normal appearing postmenopausal vulva and vagina  Did not visualize cervix secondary to patient's uncomfortability with speculum  Vagina is dry, no lesions seen  Neurological:      Gait: Abnormal gait: uses wheelchair  Psychiatric:         Mood and Affect: Mood normal          Behavior: Behavior normal            Assessment/Plan:  Problem List Items Addressed This Visit        Genitourinary    Vaginal atrophy - Primary     - Postmenopausal changes seen in vagina  - Estrace cream prescribed with instructions to use every day for 10-14 days, then taper down to 2-3x/week  - Discussed utilizing water based lubricants during sexual activities   She states she has sex about 1x/year  - Discussed low risk of endometrial/breast cancer with topical estrogen, but if she does have vaginal bleeding she should call us to be examined         Relevant Medications    estradiol (ESTRACE) 0 1 mg/g vaginal cream          D/w Dr Georgina Molina, DO  4/6/2021  10:33 AM

## 2021-04-09 DIAGNOSIS — G89.29 CHRONIC LOW BACK PAIN WITH BILATERAL SCIATICA, UNSPECIFIED BACK PAIN LATERALITY: ICD-10-CM

## 2021-04-09 DIAGNOSIS — M54.42 CHRONIC LOW BACK PAIN WITH BILATERAL SCIATICA, UNSPECIFIED BACK PAIN LATERALITY: ICD-10-CM

## 2021-04-09 DIAGNOSIS — M54.41 CHRONIC LOW BACK PAIN WITH BILATERAL SCIATICA, UNSPECIFIED BACK PAIN LATERALITY: ICD-10-CM

## 2021-04-12 RX ORDER — TIZANIDINE HYDROCHLORIDE 4 MG/1
CAPSULE, GELATIN COATED ORAL
Qty: 90 CAPSULE | Refills: 1 | Status: SHIPPED | OUTPATIENT
Start: 2021-04-12 | End: 2021-05-01 | Stop reason: HOSPADM

## 2021-04-13 DIAGNOSIS — R21 RASH: ICD-10-CM

## 2021-04-13 RX ORDER — DIAPER,BRIEF,INFANT-TODD,DISP
EACH MISCELLANEOUS 4 TIMES DAILY PRN
Qty: 56 G | Refills: 1 | Status: SHIPPED | OUTPATIENT
Start: 2021-04-13 | End: 2021-05-10

## 2021-04-19 ENCOUNTER — TELEPHONE (OUTPATIENT)
Dept: FAMILY MEDICINE CLINIC | Facility: CLINIC | Age: 51
End: 2021-04-19

## 2021-04-19 DIAGNOSIS — F31.9 BIPOLAR 1 DISORDER (HCC): ICD-10-CM

## 2021-04-19 RX ORDER — DEXTROAMPHETAMINE SACCHARATE, AMPHETAMINE ASPARTATE MONOHYDRATE, DEXTROAMPHETAMINE SULFATE AND AMPHETAMINE SULFATE 5; 5; 5; 5 MG/1; MG/1; MG/1; MG/1
20 CAPSULE, EXTENDED RELEASE ORAL 2 TIMES DAILY
Qty: 60 CAPSULE | Refills: 0 | Status: SHIPPED | OUTPATIENT
Start: 2021-04-19 | End: 2021-05-19 | Stop reason: SDUPTHER

## 2021-04-21 ENCOUNTER — TELEPHONE (OUTPATIENT)
Dept: FAMILY MEDICINE CLINIC | Facility: CLINIC | Age: 51
End: 2021-04-21

## 2021-04-21 NOTE — TELEPHONE ENCOUNTER
Per chart review, patient was seen by Neurology and they placed the referral  I would advise patient to contact central scheduling at [phone] if she would like to schedule the appointment  Thanks!

## 2021-04-21 NOTE — TELEPHONE ENCOUNTER
Pt calling to inquire about an ENT referral she was suppose to get however I do not see one in this patients chart regarding this

## 2021-04-23 ENCOUNTER — HOSPITAL ENCOUNTER (EMERGENCY)
Facility: HOSPITAL | Age: 51
Discharge: HOME | DRG: 690 | End: 2021-04-23
Attending: EMERGENCY MEDICINE | Admitting: EMERGENCY MEDICINE
Payer: COMMERCIAL

## 2021-04-23 ENCOUNTER — APPOINTMENT (EMERGENCY)
Dept: CT IMAGING | Facility: HOSPITAL | Age: 51
DRG: 690 | End: 2021-04-23
Payer: COMMERCIAL

## 2021-04-23 VITALS
SYSTOLIC BLOOD PRESSURE: 142 MMHG | RESPIRATION RATE: 18 BRPM | WEIGHT: 175.71 LBS | HEART RATE: 75 BPM | TEMPERATURE: 97.9 F | DIASTOLIC BLOOD PRESSURE: 86 MMHG | OXYGEN SATURATION: 98 % | BODY MASS INDEX: 32.14 KG/M2

## 2021-04-23 DIAGNOSIS — N12 PYELONEPHRITIS: Primary | ICD-10-CM

## 2021-04-23 DIAGNOSIS — N18.9 CHRONIC KIDNEY DISEASE: ICD-10-CM

## 2021-04-23 LAB
ALBUMIN SERPL BCP-MCNC: 4 G/DL (ref 3.5–5)
ALP SERPL-CCNC: 152 U/L (ref 46–116)
ALT SERPL W P-5'-P-CCNC: 23 U/L (ref 12–78)
ANION GAP SERPL CALCULATED.3IONS-SCNC: 11 MMOL/L (ref 4–13)
AST SERPL W P-5'-P-CCNC: 19 U/L (ref 5–45)
BACTERIA UR QL AUTO: ABNORMAL /HPF
BASOPHILS # BLD AUTO: 0.01 THOUSANDS/ΜL (ref 0–0.1)
BASOPHILS NFR BLD AUTO: 0 % (ref 0–1)
BILIRUB SERPL-MCNC: 0.35 MG/DL (ref 0.2–1)
BILIRUB UR QL STRIP: NEGATIVE
BUN SERPL-MCNC: 31 MG/DL (ref 5–25)
CALCIUM SERPL-MCNC: 9.6 MG/DL (ref 8.3–10.1)
CHLORIDE SERPL-SCNC: 107 MMOL/L (ref 100–108)
CLARITY UR: CLEAR
CO2 SERPL-SCNC: 24 MMOL/L (ref 21–32)
COLOR UR: YELLOW
CREAT SERPL-MCNC: 2.17 MG/DL (ref 0.6–1.3)
EOSINOPHIL # BLD AUTO: 0.02 THOUSAND/ΜL (ref 0–0.61)
EOSINOPHIL NFR BLD AUTO: 0 % (ref 0–6)
ERYTHROCYTE [DISTWIDTH] IN BLOOD BY AUTOMATED COUNT: 13.1 % (ref 11.6–15.1)
GFR SERPL CREATININE-BSD FRML MDRD: 26 ML/MIN/1.73SQ M
GLUCOSE SERPL-MCNC: 115 MG/DL (ref 65–140)
GLUCOSE UR STRIP-MCNC: NEGATIVE MG/DL
HCT VFR BLD AUTO: 42.3 % (ref 34.8–46.1)
HGB BLD-MCNC: 13.8 G/DL (ref 11.5–15.4)
HGB UR QL STRIP.AUTO: ABNORMAL
IMM GRANULOCYTES # BLD AUTO: 0.02 THOUSAND/UL (ref 0–0.2)
IMM GRANULOCYTES NFR BLD AUTO: 0 % (ref 0–2)
KETONES UR STRIP-MCNC: NEGATIVE MG/DL
LEUKOCYTE ESTERASE UR QL STRIP: ABNORMAL
LIPASE SERPL-CCNC: 225 U/L (ref 73–393)
LYMPHOCYTES # BLD AUTO: 0.83 THOUSANDS/ΜL (ref 0.6–4.47)
LYMPHOCYTES NFR BLD AUTO: 14 % (ref 14–44)
MCH RBC QN AUTO: 30.9 PG (ref 26.8–34.3)
MCHC RBC AUTO-ENTMCNC: 32.6 G/DL (ref 31.4–37.4)
MCV RBC AUTO: 95 FL (ref 82–98)
MONOCYTES # BLD AUTO: 0.4 THOUSAND/ΜL (ref 0.17–1.22)
MONOCYTES NFR BLD AUTO: 7 % (ref 4–12)
NEUTROPHILS # BLD AUTO: 4.87 THOUSANDS/ΜL (ref 1.85–7.62)
NEUTS SEG NFR BLD AUTO: 79 % (ref 43–75)
NITRITE UR QL STRIP: NEGATIVE
NON-SQ EPI CELLS URNS QL MICRO: ABNORMAL /HPF
NRBC BLD AUTO-RTO: 0 /100 WBCS
OTHER STN SPEC: ABNORMAL
PH UR STRIP.AUTO: 6.5 [PH] (ref 4.5–8)
PLATELET # BLD AUTO: 261 THOUSANDS/UL (ref 149–390)
PMV BLD AUTO: 9.6 FL (ref 8.9–12.7)
POTASSIUM SERPL-SCNC: 4.5 MMOL/L (ref 3.5–5.3)
PROT SERPL-MCNC: 8.5 G/DL (ref 6.4–8.2)
PROT UR STRIP-MCNC: ABNORMAL MG/DL
RBC # BLD AUTO: 4.46 MILLION/UL (ref 3.81–5.12)
RBC #/AREA URNS AUTO: ABNORMAL /HPF
SODIUM SERPL-SCNC: 142 MMOL/L (ref 136–145)
SP GR UR STRIP.AUTO: 1.02 (ref 1–1.03)
UROBILINOGEN UR QL STRIP.AUTO: 0.2 E.U./DL
WBC # BLD AUTO: 6.15 THOUSAND/UL (ref 4.31–10.16)
WBC #/AREA URNS AUTO: ABNORMAL /HPF

## 2021-04-23 PROCEDURE — 87086 URINE CULTURE/COLONY COUNT: CPT

## 2021-04-23 PROCEDURE — 83690 ASSAY OF LIPASE: CPT | Performed by: PHYSICIAN ASSISTANT

## 2021-04-23 PROCEDURE — 36415 COLL VENOUS BLD VENIPUNCTURE: CPT | Performed by: PHYSICIAN ASSISTANT

## 2021-04-23 PROCEDURE — 80053 COMPREHEN METABOLIC PANEL: CPT | Performed by: PHYSICIAN ASSISTANT

## 2021-04-23 PROCEDURE — 81001 URINALYSIS AUTO W/SCOPE: CPT

## 2021-04-23 PROCEDURE — 96376 TX/PRO/DX INJ SAME DRUG ADON: CPT

## 2021-04-23 PROCEDURE — 3066F NEPHROPATHY DOC TX: CPT | Performed by: INTERNAL MEDICINE

## 2021-04-23 PROCEDURE — 96375 TX/PRO/DX INJ NEW DRUG ADDON: CPT

## 2021-04-23 PROCEDURE — 96365 THER/PROPH/DIAG IV INF INIT: CPT

## 2021-04-23 PROCEDURE — 85025 COMPLETE CBC W/AUTO DIFF WBC: CPT | Performed by: PHYSICIAN ASSISTANT

## 2021-04-23 PROCEDURE — G1004 CDSM NDSC: HCPCS

## 2021-04-23 PROCEDURE — 3066F NEPHROPATHY DOC TX: CPT | Performed by: NURSE PRACTITIONER

## 2021-04-23 PROCEDURE — 99284 EMERGENCY DEPT VISIT MOD MDM: CPT

## 2021-04-23 PROCEDURE — 96361 HYDRATE IV INFUSION ADD-ON: CPT

## 2021-04-23 PROCEDURE — 74176 CT ABD & PELVIS W/O CONTRAST: CPT

## 2021-04-23 PROCEDURE — 99285 EMERGENCY DEPT VISIT HI MDM: CPT | Performed by: PHYSICIAN ASSISTANT

## 2021-04-23 RX ORDER — ONDANSETRON 2 MG/ML
4 INJECTION INTRAMUSCULAR; INTRAVENOUS ONCE
Status: COMPLETED | OUTPATIENT
Start: 2021-04-23 | End: 2021-04-23

## 2021-04-23 RX ORDER — FENTANYL CITRATE 50 UG/ML
25 INJECTION, SOLUTION INTRAMUSCULAR; INTRAVENOUS ONCE
Status: COMPLETED | OUTPATIENT
Start: 2021-04-23 | End: 2021-04-23

## 2021-04-23 RX ORDER — CIPROFLOXACIN 500 MG/1
500 TABLET, FILM COATED ORAL 2 TIMES DAILY
Qty: 14 TABLET | Refills: 0 | Status: SHIPPED | OUTPATIENT
Start: 2021-04-23 | End: 2021-04-26 | Stop reason: ALTCHOICE

## 2021-04-23 RX ADMIN — CEFTRIAXONE SODIUM 1000 MG: 10 INJECTION, POWDER, FOR SOLUTION INTRAVENOUS at 11:03

## 2021-04-23 RX ADMIN — SODIUM CHLORIDE 1000 ML: 0.9 INJECTION, SOLUTION INTRAVENOUS at 08:46

## 2021-04-23 RX ADMIN — FENTANYL CITRATE 25 MCG: 50 INJECTION, SOLUTION INTRAMUSCULAR; INTRAVENOUS at 08:46

## 2021-04-23 RX ADMIN — FENTANYL CITRATE 25 MCG: 50 INJECTION, SOLUTION INTRAMUSCULAR; INTRAVENOUS at 10:28

## 2021-04-23 RX ADMIN — ONDANSETRON 4 MG: 2 INJECTION INTRAMUSCULAR; INTRAVENOUS at 08:46

## 2021-04-23 NOTE — ED PROVIDER NOTES
History  Chief Complaint   Patient presents with    Flank Pain     Pt reports left flank pain for the last few days with pelvic pain, urinary frequency and n/v     Naz Tiwari is a 47 yo F, history of anemia, asthma, CKD, HTN, chronic liver disease, presenting with L flank pain for the past 3 days as well as associated urinary frequency and urgency  Patient notes this feels similar to a previous episode of pyelonephritis  Notes this pain occasionally radiates around to left side of abdomen  Reports associated nausea without vomiting  No diarrhea, constipation, or melena  Denies dysuria or gross hematuria  Notes chills but no known fevers  History provided by:  Patient   used: No    Flank Pain  Pain location:  L flank  Pain quality: aching and cramping    Pain radiation: L side of abdomen  Duration:  3 days  Timing:  Constant  Progression:  Worsening  Chronicity:  Recurrent  Context: not alcohol use, not suspicious food intake and not trauma    Relieved by:  None tried  Worsened by:  Nothing  Ineffective treatments:  None tried  Associated symptoms: chills    Associated symptoms: no chest pain, no constipation, no cough, no diarrhea, no dysuria, no fever, no hematuria, no melena, no nausea, no shortness of breath, no sore throat, no vaginal bleeding, no vaginal discharge and no vomiting        Prior to Admission Medications   Prescriptions Last Dose Informant Patient Reported? Taking?    Alcohol Swabs (ALCOHOL PADS) 70 % PADS  Self No Yes   Sig: by Does not apply route 4 (four) times a day   Blood Glucose Monitoring Suppl (FREESTYLE LITE) DEIRDRE  Self No Yes   Sig: by Does not apply route daily   Easy Comfort Lancets MISC   No Yes   Sig: USE TO TEST THE BLOOD SUGAR THREE TIMES A DAY   FREESTYLE LITE test strip   No Yes   Sig: USE TO TEST THE BLOOD SUGAR THREE TIMES A DAY   Insulin Pen Needle (PEN NEEDLES) 31G X 8 MM MISC  Self No Yes   Sig: Inject 1 Stick under the skin 4 (four) times a day (with meals and at bedtime)   Movantik 25 MG tablet   Yes Yes   Nutritional Supplements (Ensure Original) LIQD   No Yes   Sig: Take 1 Bottle by mouth 2 (two) times a day   Probiotic Product (PROBIOTIC-10 PO)  Self Yes Yes   Sig: Take 1 tablet by mouth daily   QUEtiapine (SEROquel) 200 mg tablet   Yes Yes   Sig: Take 200 mg by mouth daily at bedtime   TiZANidine (ZANAFLEX) 4 MG capsule   No Yes   Sig: TAKE ONE CAPSULE BY MOUTH THREE TIMES A DAY   Toujeo Max SoloStar 300 units/mL CONCETRATED U-300 injection pen (2-unit dial)   No Yes   Sig: INJECT 22 UNITS UNDER THE SKIN DAILY   albuterol (2 5 mg/3 mL) 0 083 % nebulizer solution   No Yes   Sig: Take 1 vial (2 5 mg total) by nebulization every 6 (six) hours as needed for wheezing or shortness of breath   albuterol (PROVENTIL HFA,VENTOLIN HFA) 90 mcg/act inhaler   No Yes   Sig: Inhale 2 puffs every 6 (six) hours as needed for wheezing or shortness of breath   amLODIPine (NORVASC) 5 mg tablet   No Yes   Sig: TAKE ONE TABLET BY MOUTH ONCE DAILY   amphetamine-dextroamphetamine (ADDERALL XR) 20 MG 24 hr capsule   No Yes   Sig: Take 1 capsule (20 mg total) by mouth 2 (two) times a dayMax Daily Amount: 40 mg   atorvastatin (LIPITOR) 20 mg tablet   No Yes   Sig: Take 1 tablet (20 mg total) by mouth daily   buPROPion (WELLBUTRIN XL) 150 mg 24 hr tablet  Self Yes Yes   Sig: bupropion HCl  mg 24 hr tablet, extended release   buPROPion (WELLBUTRIN XL) 300 mg 24 hr tablet   Yes Yes   Sig: Take 300 mg by mouth every morning    conjugated estrogens (Premarin) vaginal cream   No Yes   Sig: INSERT 1 GRAM PER VAGINA DAILY AT BEDTIME  FOR TWO WEEKS AND THEN CHANGE TO 1 GRAM TWICE WEEKLY   cycloSPORINE (RESTASIS) 0 05 % ophthalmic emulsion  Self Yes Yes   Sig: Administer 1 drop to both eyes 2 (two) times a day   dexlansoprazole (Dexilant) 60 MG capsule   No Yes   Sig: Take 1 capsule (60 mg total) by mouth daily   dicyclomine (BENTYL) 10 mg capsule   Yes Yes   docusate sodium (COLACE) 100 mg capsule   No Yes   Sig: Take 1 capsule (100 mg total) by mouth 2 (two) times a day   estradiol (ESTRACE) 0 1 mg/g vaginal cream   No Yes   Sig: Insert 2 g into the vagina daily   hydrocortisone 1 % cream   No Yes   Sig: Apply topically 4 (four) times a day as needed for irritation   insulin aspart (NovoLOG FlexPen) 100 UNIT/ML injection pen   No Yes   Sig: Inject 6 Units under the skin 3 (three) times a day with meals   lidocaine (XYLOCAINE) 5 % ointment   No Yes   Sig: APPLY TOPICALLY 2GM TWICE A DAY TO AFFECTED AREAS AS NEEDED FOR MILD PAIN   linaCLOtide (Linzess) 72 MCG CAPS   No Yes   Sig: Take 72 mcg by mouth daily   montelukast (SINGULAIR) 10 mg tablet   No Yes   Sig: TAKE ONE TABLET BY MOUTH ONCE DAILY AT BEDTIME   ondansetron (ZOFRAN) 4 mg tablet   No Yes   Sig: Take 1 tablet (4 mg total) by mouth every 8 (eight) hours as needed for nausea or vomiting   oxyCODONE-acetaminophen (PERCOCET)  mg per tablet   No Yes   Sig: Take 1 tablet by mouth every 6 (six) hours as needed for moderate painMax Daily Amount: 4 tablets   pantoprazole (PROTONIX) 40 mg tablet Not Taking at Unknown time  No No   Sig: Take 1 tablet (40 mg total) by mouth 2 (two) times a day before meals   Patient not taking: Reported on 4/6/2021   polyethylene glycol (GLYCOLAX) 17 GM/SCOOP powder Not Taking at Unknown time  No No   Sig: DISSOLVE 17 GRAMS(ONE CAPFUL) IN 8 OUNCE OF WATER/JUICE & TAKE BY MOUTH DAILY AS DIRECTED *MAXIMUM ONE DOSE DAILY*   Patient not taking: Reported on 4/6/2021   scopolamine (TRANSDERM-SCOP) 1 5 mg/3 days TD 72 hr patch Not Taking at Unknown time  No No   Sig: Place 1 patch on the skin every third day   Patient not taking: Reported on 4/6/2021   sucralfate (CARAFATE) 1 g/10 mL suspension   No Yes   Sig: Take 10 mL (1 g total) by mouth 4 (four) times a day (with meals and at bedtime)      Facility-Administered Medications: None       Past Medical History:   Diagnosis Date    ADHD     Anemia of chronic disease  Anxiety     Asthma     Asthma     Borderline personality disorder ([place] [address] )     Cataplexy     Chronic abdominal pain     CKD (chronic kidney disease) stage 3, GFR 30-59 ml/min (HCC)     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HCC)     Cushing syndrome ([place] [address] )     Diabetes mellitus ([place] [address] )     DVT (deep venous thrombosis) (HCC)     GERD (gastroesophageal reflux disease)     History of acute pancreatitis     felt secondary to Bactrim    History of transfusion     HTN (hypertension)     Hypertension     Liver disease     fatty liver    Microscopic polyangiitis (HCC)     Morbid obesity (HCC)     MPA (microscopic polyangiitis) (HCC)     Ovarian cyst     PTSD (post-traumatic stress disorder)     Renal disorder     Self-inflicted injury     self inflicted skin wounds    Wegener's granulomatosis with renal involvement ([place] [address] ) 2015       Past Surgical History:   Procedure Laterality Date    COLONOSCOPY      ESOPHAGOGASTRODUODENOSCOPY  09/11/2015    mild antral gastritis    GASTRIC STIMULATOR IMPLANT SURGERY  06/25/2020    VT COLONOSCOPY FLX DX W/COLLJ SPEC WHEN PFRMD N/A 12/14/2018    Procedure: COLONOSCOPY with polypectomy;  Surgeon: Barb Coelho MD;  Location: [facility]; Service: Gastroenterology    VT ESOPHAGOGASTRODUODENOSCOPY TRANSORAL DIAGNOSTIC N/A 12/14/2018    Procedure: ESOPHAGOGASTRODUODENOSCOPY (EGD) with biopsy;  Surgeon: Barb Coelho MD;  Location: [facility]; Service: Gastroenterology    RELEASE SCAR CONTRACTURE / GRAFT REPAIRS OF HAND Bilateral     UPPER GASTROINTESTINAL ENDOSCOPY  12/26/2019       Family History   Problem Relation Age of Onset    No Known Problems Mother     No Known Problems Father     Colon cancer Neg Hx     Drug abuse Neg Hx         mother father    Mental illness Neg Hx         disorder, mother father    Cancer Neg Hx     Breast cancer Neg Hx      I have reviewed and agree with the history as documented      E-Cigarette/Vaping    E-Cigarette Use Never User E-Cigarette/Vaping Substances    Nicotine No     THC No     CBD No      Social History     Tobacco Use    Smoking status: Former Smoker     Quit date: 2011     Years since quitting: 10 3    Smokeless tobacco: Never Used    Tobacco comment: marijuana   Substance Use Topics    Alcohol use: No     Frequency: Never    Drug use: Yes     Types: Marijuana     Comment: marijuana daily       Review of Systems   Constitutional: Positive for chills  Negative for fever  HENT: Negative for congestion, rhinorrhea and sore throat  Eyes: Negative for pain and visual disturbance  Respiratory: Negative for cough, shortness of breath and wheezing  Cardiovascular: Negative for chest pain and palpitations  Gastrointestinal: Negative for abdominal pain, constipation, diarrhea, melena, nausea and vomiting  Genitourinary: Positive for flank pain, frequency and urgency  Negative for dysuria, hematuria, vaginal bleeding, vaginal discharge and vaginal pain  Musculoskeletal: Negative for back pain, neck pain and neck stiffness  Skin: Negative for rash and wound  Neurological: Negative for dizziness, weakness, light-headedness and numbness  Physical Exam  Physical Exam  Constitutional:       General: She is not in acute distress  Appearance: She is well-developed  She is not toxic-appearing or diaphoretic  HENT:      Head: Normocephalic and atraumatic  Right Ear: External ear normal       Left Ear: External ear normal    Eyes:      Conjunctiva/sclera: Conjunctivae normal       Pupils: Pupils are equal, round, and reactive to light  Neck:      Musculoskeletal: Normal range of motion and neck supple  Cardiovascular:      Rate and Rhythm: Normal rate and regular rhythm  Heart sounds: Normal heart sounds  No murmur  No friction rub  No gallop  Pulmonary:      Effort: Pulmonary effort is normal  No respiratory distress  Breath sounds: Normal breath sounds  No wheezing     Abdominal: General: There is no distension  Palpations: Abdomen is soft  Tenderness: There is no abdominal tenderness  There is left CVA tenderness  There is no right CVA tenderness, guarding or rebound  Lymphadenopathy:      Cervical: No cervical adenopathy  Skin:     General: Skin is warm and dry  Capillary Refill: Capillary refill takes less than 2 seconds  Findings: No erythema or rash  Neurological:      Mental Status: She is alert and oriented to person, place, and time  Motor: No abnormal muscle tone  Coordination: Coordination normal    Psychiatric:         Behavior: Behavior normal          Thought Content:  Thought content normal          Judgment: Judgment normal          Vital Signs  ED Triage Vitals   Temperature Pulse Respirations Blood Pressure SpO2   04/23/21 0828 04/23/21 0828 04/23/21 0828 04/23/21 0828 04/23/21 0828   97 9 °F (36 6 °C) 81 18 (!) 145/101 98 %      Temp Source Heart Rate Source Patient Position - Orthostatic VS BP Location FiO2 (%)   04/23/21 0828 04/23/21 0828 04/23/21 0828 04/23/21 0828 --   Oral Monitor Lying Right arm       Pain Score       04/23/21 0846       8           Vitals:    04/23/21 0828 04/23/21 1000 04/23/21 1155   BP: (!) 145/101 138/86 142/86   Pulse: 81 75 75   Patient Position - Orthostatic VS: Lying Lying Sitting         Visual Acuity      ED Medications  Medications   sodium chloride 0 9 % bolus 1,000 mL (0 mL Intravenous Stopped 4/23/21 1000)   ondansetron (ZOFRAN) injection 4 mg (4 mg Intravenous Given 4/23/21 0846)   fentanyl citrate (PF) 100 MCG/2ML 25 mcg (25 mcg Intravenous Given 4/23/21 0846)   fentanyl citrate (PF) 100 MCG/2ML 25 mcg (25 mcg Intravenous Given 4/23/21 1028)   ceftriaxone (ROCEPHIN) 1 g/50 mL in dextrose IVPB (0 mg Intravenous Stopped 4/23/21 1155)       Diagnostic Studies  Results Reviewed     Procedure Component Value Units Date/Time    Urine Microscopic [[id number]]  (Abnormal) Collected: 04/23/21 0957    Lab Status: Final result Specimen: Urine, Clean Catch Updated: 04/23/21 1032     RBC, UA 1-2 /hpf      WBC, UA 30-50 /hpf      Epithelial Cells Moderate /hpf      Bacteria, UA Occasional /hpf      OTHER OBSERVATIONS Transitional Epithelial Cells  Trichomonas Organisms Present    Urine culture [[id number]] Collected: 04/23/21 0957    Lab Status:  In process Specimen: Urine, Clean Catch Updated: 04/23/21 1032    Urine Macroscopic, POC [[id number]]  (Abnormal) Collected: 04/23/21 0957    Lab Status: Final result Specimen: Urine Updated: 04/23/21 0959     Color, UA Yellow     Clarity, UA Clear     pH, UA 6 5     Leukocytes, UA Small     Nitrite, UA Negative     Protein,  (2+) mg/dl      Glucose, UA Negative mg/dl      Ketones, UA Negative mg/dl      Urobilinogen, UA 0 2 E U /dl      Bilirubin, UA Negative     Blood, UA Small     Specific Solen, UA 1 025    Narrative:      CLINITEK RESULT    Comprehensive metabolic panel [[id number]]  (Abnormal) Collected: 04/23/21 0845    Lab Status: Final result Specimen: Blood from Arm, Right Updated: 04/23/21 0914     Sodium 142 mmol/L      Potassium 4 5 mmol/L      Chloride 107 mmol/L      CO2 24 mmol/L      ANION GAP 11 mmol/L      BUN 31 mg/dL      Creatinine 2 17 mg/dL      Glucose 115 mg/dL      Calcium 9 6 mg/dL      AST 19 U/L      ALT 23 U/L      Alkaline Phosphatase 152 U/L      Total Protein 8 5 g/dL      Albumin 4 0 g/dL      Total Bilirubin 0 35 mg/dL      eGFR 26 ml/min/1 73sq m     Narrative:      National Kidney Disease Foundation guidelines for Chronic Kidney Disease (CKD):     Stage 1 with normal or high GFR (GFR > 90 mL/min/1 73 square meters)    Stage 2 Mild CKD (GFR = 60-89 mL/min/1 73 square meters)    Stage 3A Moderate CKD (GFR = 45-59 mL/min/1 73 square meters)    Stage 3B Moderate CKD (GFR = 30-44 mL/min/1 73 square meters)    Stage 4 Severe CKD (GFR = 15-29 mL/min/1 73 square meters)    Stage 5 End Stage CKD (GFR <15 mL/min/1 73 square meters)  Note: GFR calculation is accurate only with a steady state creatinine    Lipase [[id number]]  (Normal) Collected: 04/23/21 0845    Lab Status: Final result Specimen: Blood from Arm, Right Updated: 04/23/21 0914     Lipase 225 u/L     CBC and differential [[id number]]  (Abnormal) Collected: 04/23/21 0845    Lab Status: Final result Specimen: Blood from Arm, Right Updated: 04/23/21 0856     WBC 6 15 Thousand/uL      RBC 4 46 Million/uL      Hemoglobin 13 8 g/dL      Hematocrit 42 3 %      MCV 95 fL      MCH 30 9 pg      MCHC 32 6 g/dL      RDW 13 1 %      MPV 9 6 fL      Platelets 388 Thousands/uL      nRBC 0 /100 WBCs      Neutrophils Relative 79 %      Immat GRANS % 0 %      Lymphocytes Relative 14 %      Monocytes Relative 7 %      Eosinophils Relative 0 %      Basophils Relative 0 %      Neutrophils Absolute 4 87 Thousands/µL      Immature Grans Absolute 0 02 Thousand/uL      Lymphocytes Absolute 0 83 Thousands/µL      Monocytes Absolute 0 40 Thousand/µL      Eosinophils Absolute 0 02 Thousand/µL      Basophils Absolute 0 01 Thousands/µL                  CT abdomen pelvis wo contrast   Final Result by Auburn Alpers, MD (04/23 1015)      No acute pathology to account for the patient's symptoms  Colonic diverticulosis without acute diverticulitis  Benign left adrenal adenoma, unchanged  Unchanged circumscribed oval cystic structure in the left adnexa, present over multiple prior examinations  If the patient is postmenopausal, this could be    better followed with pelvic sonography  Workstation performed: [id number]                    Procedures  Procedures         ED Course  ED Course as of Apr 23 1502   Fri Apr 23, 2021   0932 Consistent with baseline CKD   Creatinine(!): 2 17   0932 Will obtain CT abdomen/pelvis  However, given decreased GFR will obtain noncontrast    eGFR: 26   1051 WBC, UA(!): 30-50       1131 Notes significant improvement in pain  Given rocephin for suspected UTI/pyelonephritis  SBIRT 22yo+      Most Recent Value   SBIRT (24 yo +)   In order to provide better care to our patients, we are screening all of our patients for alcohol and drug use  Would it be okay to ask you these screening questions? Yes Filed at: 04/23/2021 0831   Initial Alcohol Screen: US AUDIT-C    1  How often do you have a drink containing alcohol?  0 Filed at: 04/23/2021 0831   2  How many drinks containing alcohol do you have on a typical day you are drinking? 0 Filed at: 04/23/2021 0831   3a  Male UNDER 65: How often do you have five or more drinks on one occasion? 0 Filed at: 04/23/2021 0831   3b  FEMALE Any Age, or MALE 65+: How often do you have 4 or more drinks on one occassion? 0 Filed at: 04/23/2021 0831   Audit-C Score  0 Filed at: 04/23/2021 7945   BEATA: How many times in the past year have you    Used an illegal drug or used a prescription medication for non-medical reasons? Never Filed at: 04/23/2021 0831                    MDM  Number of Diagnoses or Management Options  Chronic kidney disease:   Pyelonephritis:   Diagnosis management comments: L sided flank pain, urinary symptoms over past several days  Also notes associated chills but no known fevers, afebrile on arrival  Patient is nontoxic, otherwise well appearing  L sided CVAT on exam  Will check labs for kidney function, leukocytosis  Will check CT abdomen/pelvis without contrast given chronic CKD/impaired GFR  Fentanyl here for pain, fluids, Zofran for nausea  Will consider dose of IV antibiotics if urine dip/micro suggestive of UTI  Disposition based on workup, imaging results          Amount and/or Complexity of Data Reviewed  Clinical lab tests: ordered and reviewed  Tests in the radiology section of CPT®: ordered and reviewed    Patient Progress  Patient progress: stable      Disposition  Final diagnoses:   Pyelonephritis   Chronic kidney disease     Time reflects when diagnosis was documented in both MDM as applicable and the Disposition within this note     Time User Action Codes Description Comment    4/23/2021 11:47 AM Gurwinder Blas Add [N12] Pyelonephritis     4/23/2021  3:11 PM Gurwinder Blas Add [N18 9] Chronic kidney disease       ED Disposition     ED Disposition Condition Date/Time Comment    Discharge Stable Fri Apr 23, 2021 11:47 AM Laurie Storey discharge to home/self care              Follow-up Information     Follow up With Specialties Details Why Contact Info Additional Information    [facility] Schedule an appointment as soon as possible for a visit   [address]  [address] [facility]  [place]  [phone]  [phone]  [address] [facility] Emergency Department Emergency Medicine  If symptoms worsen [facility] [id number] [address] [facility] Emergency Department, [address] [place] [address], [id number]          Discharge Medication List as of 4/23/2021 11:54 AM      START taking these medications    Details   ciprofloxacin (CIPRO) 500 mg tablet Take 1 tablet (500 mg total) by mouth 2 (two) times a day for 7 days, Starting Fri 4/23/2021, Until Fri 4/30/2021, Normal         CONTINUE these medications which have NOT CHANGED    Details   albuterol (2 5 mg/3 mL) 0 083 % nebulizer solution Take 1 vial (2 5 mg total) by nebulization every 6 (six) hours as needed for wheezing or shortness of breath, Starting Mon 1/11/2021, Normal      albuterol (PROVENTIL HFA,VENTOLIN HFA) 90 mcg/act inhaler Inhale 2 puffs every 6 (six) hours as needed for wheezing or shortness of breath, Starting Wed 3/17/2021, Normal      Alcohol Swabs (ALCOHOL PADS) 70 % PADS by Does not apply route 4 (four) times a day, Starting Fri 6/7/2019, Normal      amLODIPine (NORVASC) 5 mg tablet TAKE ONE TABLET BY MOUTH ONCE DAILY, Normal      amphetamine-dextroamphetamine (ADDERALL XR) 20 MG 24 hr capsule Take 1 capsule (20 mg total) by mouth 2 (two) times a dayMax Daily Amount: 40 mg, Starting Mon 4/19/2021, Normal      atorvastatin (LIPITOR) 20 mg tablet Take 1 tablet (20 mg total) by mouth daily, Starting Wed 3/3/2021, Normal      Blood Glucose Monitoring Suppl (FREESTYLE LITE) DEIRDRE by Does not apply route daily, Starting Tue 5/21/2019, Normal      !!  buPROPion (WELLBUTRIN XL) 150 mg 24 hr tablet bupropion HCl  mg 24 hr tablet, extended release, Historical Med      !! buPROPion (WELLBUTRIN XL) 300 mg 24 hr tablet Take 300 mg by mouth every morning , Starting Fri 12/4/2020, Historical Med      conjugated estrogens (Premarin) vaginal cream INSERT 1 GRAM PER VAGINA DAILY AT BEDTIME  FOR TWO WEEKS AND THEN CHANGE TO 1 GRAM TWICE WEEKLY, Normal      cycloSPORINE (RESTASIS) 0 05 % ophthalmic emulsion Administer 1 drop to both eyes 2 (two) times a day, Historical Med      dexlansoprazole (Dexilant) 60 MG capsule Take 1 capsule (60 mg total) by mouth daily, Starting Thu 2/18/2021, Normal      dicyclomine (BENTYL) 10 mg capsule Starting Fri 11/27/2020, Historical Med      docusate sodium (COLACE) 100 mg capsule Take 1 capsule (100 mg total) by mouth 2 (two) times a day, Starting Thu 11/19/2020, Normal      Easy Comfort Lancets MISC USE TO TEST THE BLOOD SUGAR THREE TIMES A DAY, Normal      estradiol (ESTRACE) 0 1 mg/g vaginal cream Insert 2 g into the vagina daily, Starting Tue 4/6/2021, Normal      FREESTYLE LITE test strip USE TO TEST THE BLOOD SUGAR THREE TIMES A DAY, Normal      hydrocortisone 1 % cream Apply topically 4 (four) times a day as needed for irritation, Starting Tue 4/13/2021, Normal      insulin aspart (NovoLOG FlexPen) 100 UNIT/ML injection pen Inject 6 Units under the skin 3 (three) times a day with meals, Starting Thu 9/24/2020, Normal      Insulin Pen Needle (PEN NEEDLES) 31G X 8 MM MISC Inject 1 Stick under the skin 4 (four) times a day (with meals and at bedtime), Starting Wed 3/28/2018, Normal      lidocaine (XYLOCAINE) 5 % ointment APPLY TOPICALLY 2GM TWICE A DAY TO AFFECTED AREAS AS NEEDED FOR MILD PAIN, Normal      linaCLOtide (Linzess) 72 MCG CAPS Take 72 mcg by mouth daily, Starting Thu 2/18/2021, Normal      montelukast (SINGULAIR) 10 mg tablet TAKE ONE TABLET BY MOUTH ONCE DAILY AT BEDTIME, Normal      Movantik 25 MG tablet Starting Fri 11/27/2020, Historical Med      Nutritional Supplements (Ensure Original) LIQD Take 1 Bottle by mouth 2 (two) times a day, Starting Tue 10/13/2020, Print      ondansetron (ZOFRAN) 4 mg tablet Take 1 tablet (4 mg total) by mouth every 8 (eight) hours as needed for nausea or vomiting, Starting Thu 9/24/2020, Normal      oxyCODONE-acetaminophen (PERCOCET)  mg per tablet Take 1 tablet by mouth every 6 (six) hours as needed for moderate painMax Daily Amount: 4 tablets, Starting Sat 4/10/2021, Normal      Probiotic Product (PROBIOTIC-10 PO) Take 1 tablet by mouth daily, Historical Med      QUEtiapine (SEROquel) 200 mg tablet Take 200 mg by mouth daily at bedtime, Historical Med      sucralfate (CARAFATE) 1 g/10 mL suspension Take 10 mL (1 g total) by mouth 4 (four) times a day (with meals and at bedtime), Starting Mon 12/28/2020, Normal      TiZANidine (ZANAFLEX) 4 MG capsule TAKE ONE CAPSULE BY MOUTH THREE TIMES A DAY, Normal      Toujeo Max SoloStar 300 units/mL CONCETRATED U-300 injection pen (2-unit dial) INJECT 22 UNITS UNDER THE SKIN DAILY, Starting Mon 2/22/2021, Normal      pantoprazole (PROTONIX) 40 mg tablet Take 1 tablet (40 mg total) by mouth 2 (two) times a day before meals, Starting Wed 8/26/2020, Normal      polyethylene glycol (GLYCOLAX) 17 GM/SCOOP powder DISSOLVE 17 GRAMS(ONE CAPFUL) IN 8 OUNCE OF WATER/JUICE & TAKE BY MOUTH DAILY AS DIRECTED *MAXIMUM ONE DOSE DAILY*, Normal      scopolamine (TRANSDERM-SCOP) 1 5 mg/3 days TD 72 hr patch Place 1 patch on the skin every third day, Starting Tue 3/17/2020, Normal       !! - Potential duplicate medications found  Please discuss with provider  No discharge procedures on file      PDMP Review       Value Time User    PDMP Reviewed  Yes 4/19/2021  9:28 PM Jovan Regalado PA-C          ED Provider  Electronically Signed by           Maximo Melgoza PA-C  04/23/21 [id number]

## 2021-04-23 NOTE — DISCHARGE INSTRUCTIONS
Please refer to the attached information for strict return instructions  If symptoms worsen or new symptoms develop please return to the ER  Use prescribed antibiotic for full course as instructed  Please schedule follow up with your primary care physician for re-evaluation

## 2021-04-23 NOTE — ED NOTES
Pt returned from CT c/o 10/10 pain unrelieved by fentanyl  Nova Kothari made aware       Abdullahi Sauceda RN  04/23/21 4664

## 2021-04-24 LAB — BACTERIA UR CULT: ABNORMAL

## 2021-04-25 ENCOUNTER — HOSPITAL ENCOUNTER (EMERGENCY)
Facility: HOSPITAL | Age: 51
Discharge: HOME | End: 2021-04-25
Attending: EMERGENCY MEDICINE | Admitting: EMERGENCY MEDICINE
Payer: COMMERCIAL

## 2021-04-25 VITALS
TEMPERATURE: 97.5 F | HEART RATE: 66 BPM | SYSTOLIC BLOOD PRESSURE: 117 MMHG | RESPIRATION RATE: 18 BRPM | DIASTOLIC BLOOD PRESSURE: 70 MMHG | OXYGEN SATURATION: 97 %

## 2021-04-25 DIAGNOSIS — A59.9 TRICHOMONIASIS: ICD-10-CM

## 2021-04-25 DIAGNOSIS — N12 PYELONEPHRITIS: Primary | ICD-10-CM

## 2021-04-25 LAB
ALBUMIN SERPL BCP-MCNC: 3.7 G/DL (ref 3.5–5)
ALP SERPL-CCNC: 127 U/L (ref 46–116)
ALT SERPL W P-5'-P-CCNC: 26 U/L (ref 12–78)
ANION GAP SERPL CALCULATED.3IONS-SCNC: 5 MMOL/L (ref 4–13)
AST SERPL W P-5'-P-CCNC: 48 U/L (ref 5–45)
BACTERIA UR QL AUTO: ABNORMAL /HPF
BASOPHILS # BLD AUTO: 0.02 THOUSANDS/ΜL (ref 0–0.1)
BASOPHILS NFR BLD AUTO: 0 % (ref 0–1)
BILIRUB SERPL-MCNC: 0.39 MG/DL (ref 0.2–1)
BILIRUB UR QL STRIP: NEGATIVE
BUN SERPL-MCNC: 32 MG/DL (ref 5–25)
CALCIUM SERPL-MCNC: 8.8 MG/DL (ref 8.3–10.1)
CHLORIDE SERPL-SCNC: 113 MMOL/L (ref 100–108)
CLARITY UR: ABNORMAL
CO2 SERPL-SCNC: 22 MMOL/L (ref 21–32)
COLOR UR: YELLOW
CREAT SERPL-MCNC: 2.59 MG/DL (ref 0.6–1.3)
EOSINOPHIL # BLD AUTO: 0.02 THOUSAND/ΜL (ref 0–0.61)
EOSINOPHIL NFR BLD AUTO: 0 % (ref 0–6)
ERYTHROCYTE [DISTWIDTH] IN BLOOD BY AUTOMATED COUNT: 13 % (ref 11.6–15.1)
GFR SERPL CREATININE-BSD FRML MDRD: 21 ML/MIN/1.73SQ M
GLUCOSE SERPL-MCNC: 69 MG/DL (ref 65–140)
GLUCOSE UR STRIP-MCNC: NEGATIVE MG/DL
HCT VFR BLD AUTO: 37.7 % (ref 34.8–46.1)
HGB BLD-MCNC: 12.7 G/DL (ref 11.5–15.4)
HGB UR QL STRIP.AUTO: ABNORMAL
HYALINE CASTS #/AREA URNS LPF: ABNORMAL /LPF
IMM GRANULOCYTES # BLD AUTO: 0.02 THOUSAND/UL (ref 0–0.2)
IMM GRANULOCYTES NFR BLD AUTO: 0 % (ref 0–2)
KETONES UR STRIP-MCNC: NEGATIVE MG/DL
LEUKOCYTE ESTERASE UR QL STRIP: ABNORMAL
LIPASE SERPL-CCNC: 162 U/L (ref 73–393)
LYMPHOCYTES # BLD AUTO: 1.36 THOUSANDS/ΜL (ref 0.6–4.47)
LYMPHOCYTES NFR BLD AUTO: 19 % (ref 14–44)
MCH RBC QN AUTO: 30.8 PG (ref 26.8–34.3)
MCHC RBC AUTO-ENTMCNC: 33.7 G/DL (ref 31.4–37.4)
MCV RBC AUTO: 92 FL (ref 82–98)
MONOCYTES # BLD AUTO: 0.85 THOUSAND/ΜL (ref 0.17–1.22)
MONOCYTES NFR BLD AUTO: 12 % (ref 4–12)
NEUTROPHILS # BLD AUTO: 4.91 THOUSANDS/ΜL (ref 1.85–7.62)
NEUTS SEG NFR BLD AUTO: 69 % (ref 43–75)
NITRITE UR QL STRIP: NEGATIVE
NON-SQ EPI CELLS URNS QL MICRO: ABNORMAL /HPF
NRBC BLD AUTO-RTO: 0 /100 WBCS
PH UR STRIP.AUTO: 5.5 [PH]
PLATELET # BLD AUTO: 264 THOUSANDS/UL (ref 149–390)
PMV BLD AUTO: 9.7 FL (ref 8.9–12.7)
POTASSIUM SERPL-SCNC: 5.5 MMOL/L (ref 3.5–5.3)
PROT SERPL-MCNC: 8 G/DL (ref 6.4–8.2)
PROT UR STRIP-MCNC: ABNORMAL MG/DL
RBC # BLD AUTO: 4.12 MILLION/UL (ref 3.81–5.12)
RBC #/AREA URNS AUTO: ABNORMAL /HPF
SODIUM SERPL-SCNC: 140 MMOL/L (ref 136–145)
SP GR UR STRIP.AUTO: 1.02 (ref 1–1.03)
UROBILINOGEN UR QL STRIP.AUTO: 1 E.U./DL
WBC # BLD AUTO: 7.18 THOUSAND/UL (ref 4.31–10.16)
WBC #/AREA URNS AUTO: ABNORMAL /HPF

## 2021-04-25 PROCEDURE — 96375 TX/PRO/DX INJ NEW DRUG ADDON: CPT

## 2021-04-25 PROCEDURE — 85025 COMPLETE CBC W/AUTO DIFF WBC: CPT | Performed by: EMERGENCY MEDICINE

## 2021-04-25 PROCEDURE — 96367 TX/PROPH/DG ADDL SEQ IV INF: CPT

## 2021-04-25 PROCEDURE — 83690 ASSAY OF LIPASE: CPT | Performed by: EMERGENCY MEDICINE

## 2021-04-25 PROCEDURE — 87086 URINE CULTURE/COLONY COUNT: CPT | Performed by: EMERGENCY MEDICINE

## 2021-04-25 PROCEDURE — 36415 COLL VENOUS BLD VENIPUNCTURE: CPT | Performed by: EMERGENCY MEDICINE

## 2021-04-25 PROCEDURE — 80053 COMPREHEN METABOLIC PANEL: CPT | Performed by: EMERGENCY MEDICINE

## 2021-04-25 PROCEDURE — 99285 EMERGENCY DEPT VISIT HI MDM: CPT | Performed by: EMERGENCY MEDICINE

## 2021-04-25 PROCEDURE — 81001 URINALYSIS AUTO W/SCOPE: CPT | Performed by: EMERGENCY MEDICINE

## 2021-04-25 PROCEDURE — 99283 EMERGENCY DEPT VISIT LOW MDM: CPT

## 2021-04-25 PROCEDURE — 96365 THER/PROPH/DIAG IV INF INIT: CPT

## 2021-04-25 RX ORDER — SODIUM CHLORIDE, SODIUM GLUCONATE, SODIUM ACETATE, POTASSIUM CHLORIDE, MAGNESIUM CHLORIDE, SODIUM PHOSPHATE, DIBASIC, AND POTASSIUM PHOSPHATE .53; .5; .37; .037; .03; .012; .00082 G/100ML; G/100ML; G/100ML; G/100ML; G/100ML; G/100ML; G/100ML
1000 INJECTION, SOLUTION INTRAVENOUS ONCE
Status: COMPLETED | OUTPATIENT
Start: 2021-04-25 | End: 2021-04-25

## 2021-04-25 RX ORDER — HYDROMORPHONE HCL/PF 1 MG/ML
0.5 SYRINGE (ML) INJECTION ONCE
Status: COMPLETED | OUTPATIENT
Start: 2021-04-25 | End: 2021-04-25

## 2021-04-25 RX ORDER — METRONIDAZOLE 500 MG/1
1000 TABLET ORAL ONCE
Status: COMPLETED | OUTPATIENT
Start: 2021-04-25 | End: 2021-04-25

## 2021-04-25 RX ORDER — CEFPODOXIME PROXETIL 200 MG/1
200 TABLET, FILM COATED ORAL 2 TIMES DAILY
Qty: 28 TABLET | Refills: 0 | Status: SHIPPED | OUTPATIENT
Start: 2021-04-25 | End: 2021-04-27 | Stop reason: HOSPADM

## 2021-04-25 RX ORDER — ONDANSETRON 2 MG/ML
4 INJECTION INTRAMUSCULAR; INTRAVENOUS ONCE
Status: COMPLETED | OUTPATIENT
Start: 2021-04-25 | End: 2021-04-25

## 2021-04-25 RX ADMIN — CEFTRIAXONE SODIUM 1000 MG: 10 INJECTION, POWDER, FOR SOLUTION INTRAVENOUS at 15:35

## 2021-04-25 RX ADMIN — HYDROMORPHONE HYDROCHLORIDE 0.5 MG: 1 INJECTION, SOLUTION INTRAMUSCULAR; INTRAVENOUS; SUBCUTANEOUS at 14:02

## 2021-04-25 RX ADMIN — SODIUM CHLORIDE, SODIUM GLUCONATE, SODIUM ACETATE, POTASSIUM CHLORIDE, MAGNESIUM CHLORIDE, SODIUM PHOSPHATE, DIBASIC, AND POTASSIUM PHOSPHATE 1000 ML: .53; .5; .37; .037; .03; .012; .00082 INJECTION, SOLUTION INTRAVENOUS at 14:03

## 2021-04-25 RX ADMIN — ONDANSETRON 4 MG: 2 INJECTION INTRAMUSCULAR; INTRAVENOUS at 14:02

## 2021-04-25 RX ADMIN — METRONIDAZOLE 1000 MG: 500 TABLET ORAL at 15:34

## 2021-04-25 NOTE — ED ATTENDING ATTESTATION
4/25/2021  Laila Robertson DO, saw and evaluated the patient  I have discussed the patient with the resident/non-physician practitioner and agree with the resident's/non-physician practitioner's findings, Plan of Care, and MDM as documented in the resident's/non-physician practitioner's note, except where noted  All available labs and Radiology studies were reviewed  I was present for key portions of any procedure(s) performed by the resident/non-physician practitioner and I was immediately available to provide assistance  At this point I agree with the current assessment done in the Emergency Department  I have conducted an independent evaluation of this patient a history and physical is as follows:    Patient returns to emergency department for continued and worsening epigastric and left flank pain for which [facility] and had an extensive, largely unremarkable workup  She was diagnosed with UTI based on urine dip and pyelonephritis based on examination  Her UA did demonstrate Trichomonas but she was unaware of this  She states that she had Trichomonas few months ago and was treated for it and has not had intercourse since then  Nonetheless, she does state that she has had vaginal discharge in these past few days similar to that event  She has had nausea and vomiting today as well but no constipation or diarrhea  One of the episodes of dry heaving, she had also a bowel movement and her abdomen felt better temporarily  No hematochezia, melena or hematemesis  No change in symptoms w/PO intake, BM or voiding  No recent travel or similar sick contacts  ROS: Denies f/c, CP, SOB, n/v/d  12 system ROS o/w negative       PE: NAD, appears mildly uncomfortable, alert; PERRL, EOMI; MMM, no posterior oropharyngeal exudate, edema or erythema; HRR, no murmur; lungs CTA w/o w/r/r, POx 97% on RA (nl); abdomen s/nd, moderate TTP over epigastric area and pain out of proportion to palpation over the left flank w/o r/g/r or other peritoneal signs, nl BS in all 4 quadrants; (-) LE edema, FROM extremities x4; skin p/w/d, no rash; CNs appear GI/NF, oriented  DDx: Abdominal/flank pain -  UTI/pyelo, Trichomonas, pancreatitis, GERD, IBS, VZV (without rash), doubt bowel obstruction or ischemia  A/P: Will recheck abdominal labs, urine, treat symptoms, reevaluate for further work up and disposition            ED Course         Critical Care Time  Procedures

## 2021-04-25 NOTE — DISCHARGE INSTRUCTIONS
Follow-up with PCP for further care  Take your new antibiotic as prescribed and to completion  Use over the counter medications as stated on the bottle as needed for pain  Return to the ED with new or worsening symptoms

## 2021-04-25 NOTE — ED PROVIDER NOTES
History  Chief Complaint   Patient presents with    Possible UTI     treated for kidney infection 3 days ago and feels like she is not getting any better     Pt is a 54yo F who presents for left flank pain and abdominal pain  Patient reports that she was seen at the [facility] ED for the same pain 2 days ago and diagnosed with pyelonephritis  Patient was put on antibiotics at that time states she has been taking them regularly  Patient states yesterday she believes she was improving, however today the pain is back and at the same level but it was 2 days ago  Patient now concerned that she is not improving on the antibiotic  Patient primarily complaining of left flank pain that she rates an 8 to 9/10  Patient states that radiates around to the left side of her abdomen and into her left groin  Patient also complaining some epigastric abdominal pain  She states that the epigastric pain is worsened with eating and also has some associated nausea and dry heaving  Patient states that she has been nervous about vomiting for the past day and therefore has not been eating or drinking very much  Patient does report that she had 3 sips hot chocolate prior to arrival and is now feeling nauseous due to that  Patient states that she was able to take a half a Percocet this morning and keep that down which did improve her pain  Patient states that since being on the antibiotic her dysuria has improved however she is still having urinary frequency and decreased urine volumes  Patient denies any visualized hematuria, however states when wiping this morning she did have bright red blood on the tissue  Patient denies any true fevers but states she has been feeling hot and cold  Patient reports that her temperature has always been less than 98° F  Patient has significant medical history including CKD  Patient states she takes her medications regularly, however did not take them this morning due to her nausea  Prior to Admission Medications   Prescriptions Last Dose Informant Patient Reported? Taking?    Alcohol Swabs (ALCOHOL PADS) 70 % PADS  Self No No   Sig: by Does not apply route 4 (four) times a day   Blood Glucose Monitoring Suppl (FREESTYLE LITE) DEIRDRE  Self No No   Sig: by Does not apply route daily   Easy Comfort Lancets MISC   No No   Sig: USE TO TEST THE BLOOD SUGAR THREE TIMES A DAY   FREESTYLE LITE test strip   No No   Sig: USE TO TEST THE BLOOD SUGAR THREE TIMES A DAY   Insulin Pen Needle (PEN NEEDLES) 31G X 8 MM MISC  Self No No   Sig: Inject 1 Stick under the skin 4 (four) times a day (with meals and at bedtime)   Movantik 25 MG tablet   Yes No   Nutritional Supplements (Ensure Original) LIQD   No No   Sig: Take 1 Bottle by mouth 2 (two) times a day   Probiotic Product (PROBIOTIC-10 PO)  Self Yes No   Sig: Take 1 tablet by mouth daily   QUEtiapine (SEROquel) 200 mg tablet   Yes No   Sig: Take 200 mg by mouth daily at bedtime   TiZANidine (ZANAFLEX) 4 MG capsule   No No   Sig: TAKE ONE CAPSULE BY MOUTH THREE TIMES A DAY   Toujeo Max SoloStar 300 units/mL CONCETRATED U-300 injection pen (2-unit dial)   No No   Sig: INJECT 22 UNITS UNDER THE SKIN DAILY   albuterol (2 5 mg/3 mL) 0 083 % nebulizer solution   No No   Sig: Take 1 vial (2 5 mg total) by nebulization every 6 (six) hours as needed for wheezing or shortness of breath   albuterol (PROVENTIL HFA,VENTOLIN HFA) 90 mcg/act inhaler   No No   Sig: Inhale 2 puffs every 6 (six) hours as needed for wheezing or shortness of breath   amLODIPine (NORVASC) 5 mg tablet   No No   Sig: TAKE ONE TABLET BY MOUTH ONCE DAILY   amphetamine-dextroamphetamine (ADDERALL XR) 20 MG 24 hr capsule   No No   Sig: Take 1 capsule (20 mg total) by mouth 2 (two) times a dayMax Daily Amount: 40 mg   atorvastatin (LIPITOR) 20 mg tablet   No No   Sig: Take 1 tablet (20 mg total) by mouth daily   buPROPion (WELLBUTRIN XL) 150 mg 24 hr tablet  Self Yes No   Sig: bupropion HCl XL 150 mg 24 hr tablet, extended release   buPROPion (WELLBUTRIN XL) 300 mg 24 hr tablet   Yes No   Sig: Take 300 mg by mouth every morning    ciprofloxacin (CIPRO) 500 mg tablet   No No   Sig: Take 1 tablet (500 mg total) by mouth 2 (two) times a day for 7 days   conjugated estrogens (Premarin) vaginal cream   No No   Sig: INSERT 1 GRAM PER VAGINA DAILY AT BEDTIME  FOR TWO WEEKS AND THEN CHANGE TO 1 GRAM TWICE WEEKLY   cycloSPORINE (RESTASIS) 0 05 % ophthalmic emulsion  Self Yes No   Sig: Administer 1 drop to both eyes 2 (two) times a day   dexlansoprazole (Dexilant) 60 MG capsule   No No   Sig: Take 1 capsule (60 mg total) by mouth daily   dicyclomine (BENTYL) 10 mg capsule   Yes No   docusate sodium (COLACE) 100 mg capsule   No No   Sig: Take 1 capsule (100 mg total) by mouth 2 (two) times a day   estradiol (ESTRACE) 0 1 mg/g vaginal cream   No No   Sig: Insert 2 g into the vagina daily   hydrocortisone 1 % cream   No No   Sig: Apply topically 4 (four) times a day as needed for irritation   insulin aspart (NovoLOG FlexPen) 100 UNIT/ML injection pen   No No   Sig: Inject 6 Units under the skin 3 (three) times a day with meals   lidocaine (XYLOCAINE) 5 % ointment   No No   Sig: APPLY TOPICALLY 2GM TWICE A DAY TO AFFECTED AREAS AS NEEDED FOR MILD PAIN   linaCLOtide (Linzess) 72 MCG CAPS   No No   Sig: Take 72 mcg by mouth daily   montelukast (SINGULAIR) 10 mg tablet   No No   Sig: TAKE ONE TABLET BY MOUTH ONCE DAILY AT BEDTIME   ondansetron (ZOFRAN) 4 mg tablet   No No   Sig: Take 1 tablet (4 mg total) by mouth every 8 (eight) hours as needed for nausea or vomiting   oxyCODONE-acetaminophen (PERCOCET)  mg per tablet   No No   Sig: Take 1 tablet by mouth every 6 (six) hours as needed for moderate painMax Daily Amount: 4 tablets   pantoprazole (PROTONIX) 40 mg tablet   No No   Sig: Take 1 tablet (40 mg total) by mouth 2 (two) times a day before meals   Patient not taking: Reported on 4/6/2021   polyethylene glycol (GLYCOLAX) 17 GM/SCOOP powder   No No   Sig: DISSOLVE 17 GRAMS(ONE CAPFUL) IN 8 OUNCE OF WATER/JUICE & TAKE BY MOUTH DAILY AS DIRECTED *MAXIMUM ONE DOSE DAILY*   Patient not taking: Reported on 4/6/2021   scopolamine (TRANSDERM-SCOP) 1 5 mg/3 days TD 72 hr patch   No No   Sig: Place 1 patch on the skin every third day   Patient not taking: Reported on 4/6/2021   sucralfate (CARAFATE) 1 g/10 mL suspension   No No   Sig: Take 10 mL (1 g total) by mouth 4 (four) times a day (with meals and at bedtime)      Facility-Administered Medications: None       Past Medical History:   Diagnosis Date    ADHD     Anemia of chronic disease     Anxiety     Asthma     Asthma     Borderline personality disorder ([place] [id number] )     Cataplexy     Chronic abdominal pain     CKD (chronic kidney disease) stage 3, GFR 30-59 ml/min (HCC)     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[facility] )     Cushing syndrome ([facility] )     Diabetes mellitus ([facility] )     DVT (deep venous thrombosis) ([facility] )     GERD (gastroesophageal reflux disease)     History of acute pancreatitis     felt secondary to Bactrim    History of transfusion     HTN (hypertension)     Hypertension     Liver disease     fatty liver    Microscopic polyangiitis (HCC)     Morbid obesity (HCC)     MPA (microscopic polyangiitis) (HCC)     Ovarian cyst     PTSD (post-traumatic stress disorder)     Renal disorder     Self-inflicted injury     self inflicted skin wounds    Wegener's granulomatosis with renal involvement ([facility] ) 2015       Past Surgical History:   Procedure Laterality Date    COLONOSCOPY      ESOPHAGOGASTRODUODENOSCOPY  09/11/2015    mild antral gastritis    GASTRIC STIMULATOR IMPLANT SURGERY  06/25/2020    AK COLONOSCOPY FLX DX W/COLLJ SPEC WHEN PFRMD N/A 12/14/2018    Procedure: COLONOSCOPY with polypectomy;  Surgeon: Isaias Cantu MD;  Location: [facility];   Service: Gastroenterology    AK ESOPHAGOGASTRODUODENOSCOPY TRANSORAL DIAGNOSTIC N/A 12/14/2018    Procedure: ESOPHAGOGASTRODUODENOSCOPY (EGD) with biopsy;  Surgeon: Jenni Baez MD;  Location: [facility]; Service: Gastroenterology    RELEASE SCAR CONTRACTURE / GRAFT REPAIRS OF HAND Bilateral     UPPER GASTROINTESTINAL ENDOSCOPY  12/26/2019       Family History   Problem Relation Age of Onset    No Known Problems Mother     No Known Problems Father     Colon cancer Neg Hx     Drug abuse Neg Hx         mother father    Mental illness Neg Hx         disorder, mother father    Cancer Neg Hx     Breast cancer Neg Hx      I have reviewed and agree with the history as documented  E-Cigarette/Vaping    E-Cigarette Use Never User      E-Cigarette/Vaping Substances    Nicotine No     THC No     CBD No      Social History     Tobacco Use    Smoking status: Former Smoker     Quit date: 2011     Years since quitting: 10 3    Smokeless tobacco: Never Used    Tobacco comment: marijuana   Substance Use Topics    Alcohol use: No     Frequency: Never    Drug use: Yes     Types: Marijuana     Comment: marijuana daily        Review of Systems   Constitutional: Negative for chills and fever  HENT: Negative for sinus pain and sore throat  Eyes: Negative for pain  Respiratory: Negative for cough, chest tightness, shortness of breath and wheezing  Cardiovascular: Negative for chest pain, palpitations and leg swelling  Gastrointestinal: Positive for abdominal pain (epigastric), diarrhea, nausea and vomiting ('dry heaving')  Genitourinary: Positive for decreased urine volume, difficulty urinating, flank pain (L), frequency and hematuria  Negative for dysuria and urgency  Musculoskeletal: Negative for back pain, gait problem, neck pain and neck stiffness  Skin: Negative for color change, pallor, rash and wound  Neurological: Negative for syncope, weakness and headaches  Psychiatric/Behavioral: Negative for agitation, behavioral problems and confusion         Physical Exam  ED Triage Vitals   Temperature Pulse Respirations Blood Pressure SpO2   04/25/21 1303 04/25/21 1303 04/25/21 1303 04/25/21 1303 04/25/21 1303   97 5 °F (36 4 °C) 82 16 131/81 97 %      Temp Source Heart Rate Source Patient Position - Orthostatic VS BP Location FiO2 (%)   04/25/21 1303 -- -- 04/25/21 1429 --   Oral   Right arm       Pain Score       04/25/21 1301       7             Orthostatic Vital Signs  Vitals:    04/25/21 1303 04/25/21 1429 04/25/21 1500   BP: 131/81 136/69 117/70   Pulse: 82 72 66       Physical Exam  Vitals signs reviewed  Constitutional:       Appearance: She is well-developed  She is not toxic-appearing or diaphoretic  Comments: Pt mildly uncomfortable appearing and laying in R side in bed   HENT:      Head: Normocephalic and atraumatic  Right Ear: External ear normal       Left Ear: External ear normal       Nose: Nose normal       Mouth/Throat:      Mouth: Mucous membranes are dry  Pharynx: Oropharynx is clear  Eyes:      Pupils: Pupils are equal, round, and reactive to light  Neck:      Musculoskeletal: Normal range of motion  Cardiovascular:      Rate and Rhythm: Normal rate and regular rhythm  Heart sounds: Normal heart sounds  No murmur  Pulmonary:      Effort: Pulmonary effort is normal  No respiratory distress  Breath sounds: Normal breath sounds  Abdominal:      General: Bowel sounds are normal  There is no distension  Palpations: Abdomen is soft  Tenderness: There is abdominal tenderness in the epigastric area  There is left CVA tenderness  There is no right CVA tenderness  Musculoskeletal: Normal range of motion  Right lower leg: No edema  Left lower leg: No edema  Skin:     General: Skin is warm and dry  Capillary Refill: Capillary refill takes less than 2 seconds  Coloration: Skin is not pale  Findings: No erythema or rash  Neurological:      Mental Status: She is alert and oriented to person, place, and time     Psychiatric: Speech: Speech normal          Behavior: Behavior is cooperative  ED Medications  Medications   ondansetron (ZOFRAN) injection 4 mg (4 mg Intravenous Given 4/25/21 1402)   HYDROmorphone (DILAUDID) injection 0 5 mg (0 5 mg Intravenous Given 4/25/21 1402)   multi-electrolyte (ISOLYTE-S PH 7 4) bolus 1,000 mL (0 mL Intravenous Stopped 4/25/21 1503)   ceftriaxone (ROCEPHIN) 1 g/50 mL in dextrose IVPB (0 mg Intravenous Stopped 4/25/21 1605)   metroNIDAZOLE (FLAGYL) tablet 1,000 mg (1,000 mg Oral Given 4/25/21 1534)       Diagnostic Studies  Results Reviewed     Procedure Component Value Units Date/Time    Urine Microscopic [[id number]]  (Abnormal) Collected: 04/25/21 1428    Lab Status: Final result Specimen: Urine, Clean Catch Updated: 04/25/21 1501     RBC, UA 2-4 /hpf      WBC, UA Innumerable /hpf      Epithelial Cells Moderate /hpf      Bacteria, UA None Seen /hpf      Hyaline Casts, UA None Seen /lpf     Urine culture [[id number]] Collected: 04/25/21 1428    Lab Status:  In process Specimen: Urine, Clean Catch Updated: 04/25/21 1501    UA w Reflex to Microscopic w Reflex to Culture [[id number]]  (Abnormal) Collected: 04/25/21 1428    Lab Status: Final result Specimen: Urine, Clean Catch Updated: 04/25/21 1500     Color, UA Yellow     Clarity, UA Cloudy     Specific Gravity, UA 1 021     pH, UA 5 5     Leukocytes, UA Moderate     Nitrite, UA Negative     Protein,  (2+) mg/dl      Glucose, UA Negative mg/dl      Ketones, UA Negative mg/dl      Urobilinogen, UA 1 0 E U /dl      Bilirubin, UA Negative     Blood, UA Trace    Lipase [[id number]]  (Normal) Collected: 04/25/21 1401    Lab Status: Final result Specimen: Blood from Arm, Right Updated: 04/25/21 1448     Lipase 162 u/L     Comprehensive metabolic panel [[id number]]  (Abnormal) Collected: 04/25/21 1401    Lab Status: Final result Specimen: Blood from Arm, Right Updated: 04/25/21 1440     Sodium 140 mmol/L      Potassium 5 5 mmol/L      Chloride 113 mmol/L      CO2 22 mmol/L      ANION GAP 5 mmol/L      BUN 32 mg/dL      Creatinine 2 59 mg/dL      Glucose 69 mg/dL      Calcium 8 8 mg/dL      AST 48 U/L      ALT 26 U/L      Alkaline Phosphatase 127 U/L      Total Protein 8 0 g/dL      Albumin 3 7 g/dL      Total Bilirubin 0 39 mg/dL      eGFR 21 ml/min/1 73sq m     Narrative:      National Kidney Disease Foundation guidelines for Chronic Kidney Disease (CKD):     Stage 1 with normal or high GFR (GFR > 90 mL/min/1 73 square meters)    Stage 2 Mild CKD (GFR = 60-89 mL/min/1 73 square meters)    Stage 3A Moderate CKD (GFR = 45-59 mL/min/1 73 square meters)    Stage 3B Moderate CKD (GFR = 30-44 mL/min/1 73 square meters)    Stage 4 Severe CKD (GFR = 15-29 mL/min/1 73 square meters)    Stage 5 End Stage CKD (GFR <15 mL/min/1 73 square meters)  Note: GFR calculation is accurate only with a steady state creatinine    CBC and differential [[id number]] Collected: 04/25/21 1401    Lab Status: Final result Specimen: Blood from Arm, Right Updated: 04/25/21 1416     WBC 7 18 Thousand/uL      RBC 4 12 Million/uL      Hemoglobin 12 7 g/dL      Hematocrit 37 7 %      MCV 92 fL      MCH 30 8 pg      MCHC 33 7 g/dL      RDW 13 0 %      MPV 9 7 fL      Platelets 913 Thousands/uL      nRBC 0 /100 WBCs      Neutrophils Relative 69 %      Immat GRANS % 0 %      Lymphocytes Relative 19 %      Monocytes Relative 12 %      Eosinophils Relative 0 %      Basophils Relative 0 %      Neutrophils Absolute 4 91 Thousands/µL      Immature Grans Absolute 0 02 Thousand/uL      Lymphocytes Absolute 1 36 Thousands/µL      Monocytes Absolute 0 85 Thousand/µL      Eosinophils Absolute 0 02 Thousand/µL      Basophils Absolute 0 02 Thousands/µL                  No orders to display         Procedures  Procedures      ED Course  ED Course as of Apr 25 1817   Sun Apr 25, 2021   1419 Reviewed and without derangement      CBC and differential   1440 Minimally elevated likely due to moderate hemoslysis  No indication for intervention at this time  Potassium(!): 5 5   1441 Creatinine elevated from baseline  Pt already receiving fluids at this time  Creatinine(!): 2 59   1456 Lipase WNL  Lipase   1504 Moderate leuks without nitrites  Leuks increased from small on prior  Leukocytes, UA(!): Moderate   1505 Innumerable WBCs without bacteria seen  WBC, UA(!): Innumerable   1525 Pt updated on results as well as plan for treatment of trich found on previous UA and change of abx for pyelonephritis  Pt agreeable to plan  Pt reporting significant improvement of both pain and nausea on reassessment  Pt clinically looks comfortable  Pt tolerating PO intake  1627 Creatinine clearance >30 and therefore no dosing adjustment required for outpt abx  SBIRT 22yo+      Most Recent Value   SBIRT (22 yo +)   In order to provide better care to our patients, we are screening all of our patients for alcohol and drug use  Would it be okay to ask you these screening questions? Yes Filed at: 04/25/2021 1305   Initial Alcohol Screen: US AUDIT-C    1  How often do you have a drink containing alcohol?  0 Filed at: 04/25/2021 1305   2  How many drinks containing alcohol do you have on a typical day you are drinking? 0 Filed at: 04/25/2021 1305   3a  Male UNDER 65: How often do you have five or more drinks on one occasion? 0 Filed at: 04/25/2021 1305   3b  FEMALE Any Age, or MALE 65+: How often do you have 4 or more drinks on one occassion? 0 Filed at: 04/25/2021 1305   Audit-C Score  0 Filed at: 04/25/2021 1305   BEATA: How many times in the past year have you    Used an illegal drug or used a prescription medication for non-medical reasons? Never Filed at: 04/25/2021 1305                MDM  Number of Diagnoses or Management Options  Pyelonephritis:   Trichomoniasis:   Diagnosis management comments: Pt is a 54yo F who presents with left flank pain and abdominal pain   Exam pertinent for left CVA tenderness and epigastric tenderness  Differential diagnosis to include but not limited to pyelonephritis, cystitis, hemorrhagic cystitis, pancreatitis, gastroenteritis, musculoskeletal strain, zoster  Will get basic labs to evaluate for electrolytes and renal function  Will get lipase to rule out pancreatitis  Will get urine to evaluate for infection  Will treat patient's pain with Dilaudid  Creatinine elevated from baseline and fluids ordered  Lipase within normal limits pain pancreatitis less likely as cause of symptoms  UA shows moderate leuks increased from small leuks on prior UA  No evidence of nitrites, however wbc's also present  Based on worsening of urine compared to prior, will plan to change outpatient antibiotic  Will give patient single dose IV prior to discharge  Based on patient's previous urine, Trichomonas also seen  Will also treat this with p o  Flagyl prior to discharge  Updated patient on all results and plan and patient is agreeable  Patient reported significant improvement of symptoms after single dose of Dilaudid  Plan to discharge patient with follow-up to PCP  Discussed returning the ED with significant worsening of symptoms  Discussed use of over the counter medications as stated on the bottle as needed for pain  Discussed taking new medication as prescribed and to completion  Pt expressed understanding of discharge instructions, return precautions, and medication instructions  All questions were answered and pt was discharged without incident              Amount and/or Complexity of Data Reviewed  Clinical lab tests: reviewed and ordered  Discussion of test results with the performing providers: yes  Decide to obtain previous medical records or to obtain history from someone other than the patient: yes        Disposition  Final diagnoses:   Pyelonephritis   Trichomoniasis     Time reflects when diagnosis was documented in both MDM as applicable and the Disposition within this note     Time User Action Codes Description Comment    4/25/2021  4:22 PM Kamran Perez Add [N12] Pyelonephritis     4/25/2021  4:22 PM Kamran Perez Add [A59 9] Trichomoniasis       ED Disposition     ED Disposition Condition Date/Time Comment    Discharge Stable Sun Apr 25, 2021  4:27 PM Juan Antonio Barbosa discharge to home/self care  Follow-up Information     Follow up With Specialties Details Why [address] [place], [place] [facility] Call  As needed [address] [facility] [address]  [address] [place] [address]  [phone]            Discharge Medication List as of 4/25/2021  4:27 PM      START taking these medications    Details   cefpodoxime (VANTIN) 200 mg tablet Take 1 tablet (200 mg total) by mouth 2 (two) times a day for 14 days, Starting Sun 4/25/2021, Until Sun 5/9/2021, Normal         CONTINUE these medications which have NOT CHANGED    Details   albuterol (2 5 mg/3 mL) 0 083 % nebulizer solution Take 1 vial (2 5 mg total) by nebulization every 6 (six) hours as needed for wheezing or shortness of breath, Starting Mon 1/11/2021, Normal      albuterol (PROVENTIL HFA,VENTOLIN HFA) 90 mcg/act inhaler Inhale 2 puffs every 6 (six) hours as needed for wheezing or shortness of breath, Starting Wed 3/17/2021, Normal      Alcohol Swabs (ALCOHOL PADS) 70 % PADS by Does not apply route 4 (four) times a day, Starting Fri 6/7/2019, Normal      amLODIPine (NORVASC) 5 mg tablet TAKE ONE TABLET BY MOUTH ONCE DAILY, Normal      amphetamine-dextroamphetamine (ADDERALL XR) 20 MG 24 hr capsule Take 1 capsule (20 mg total) by mouth 2 (two) times a dayMax Daily Amount: 40 mg, Starting Mon 4/19/2021, Normal      atorvastatin (LIPITOR) 20 mg tablet Take 1 tablet (20 mg total) by mouth daily, Starting Wed 3/3/2021, Normal      Blood Glucose Monitoring Suppl (FREESTYLE LITE) DEIRDRE by Does not apply route daily, Starting Tue 5/21/2019, Normal      !!  buPROPion (WELLBUTRIN XL) 150 mg 24 hr tablet bupropion HCl  mg 24 hr tablet, extended release, Historical Med      !! buPROPion (WELLBUTRIN XL) 300 mg 24 hr tablet Take 300 mg by mouth every morning , Starting Fri 12/4/2020, Historical Med      ciprofloxacin (CIPRO) 500 mg tablet Take 1 tablet (500 mg total) by mouth 2 (two) times a day for 7 days, Starting Fri 4/23/2021, Until Fri 4/30/2021, Normal      conjugated estrogens (Premarin) vaginal cream INSERT 1 GRAM PER VAGINA DAILY AT BEDTIME  FOR TWO WEEKS AND THEN CHANGE TO 1 GRAM TWICE WEEKLY, Normal      cycloSPORINE (RESTASIS) 0 05 % ophthalmic emulsion Administer 1 drop to both eyes 2 (two) times a day, Historical Med      dexlansoprazole (Dexilant) 60 MG capsule Take 1 capsule (60 mg total) by mouth daily, Starting Thu 2/18/2021, Normal      dicyclomine (BENTYL) 10 mg capsule Starting Fri 11/27/2020, Historical Med      docusate sodium (COLACE) 100 mg capsule Take 1 capsule (100 mg total) by mouth 2 (two) times a day, Starting Thu 11/19/2020, Normal      Easy Comfort Lancets MISC USE TO TEST THE BLOOD SUGAR THREE TIMES A DAY, Normal      estradiol (ESTRACE) 0 1 mg/g vaginal cream Insert 2 g into the vagina daily, Starting Tue 4/6/2021, Normal      FREESTYLE LITE test strip USE TO TEST THE BLOOD SUGAR THREE TIMES A DAY, Normal      hydrocortisone 1 % cream Apply topically 4 (four) times a day as needed for irritation, Starting Tue 4/13/2021, Normal      insulin aspart (NovoLOG FlexPen) 100 UNIT/ML injection pen Inject 6 Units under the skin 3 (three) times a day with meals, Starting Thu 9/24/2020, Normal      Insulin Pen Needle (PEN NEEDLES) 31G X 8 MM MISC Inject 1 Stick under the skin 4 (four) times a day (with meals and at bedtime), Starting Wed 3/28/2018, Normal      lidocaine (XYLOCAINE) 5 % ointment APPLY TOPICALLY 2GM TWICE A DAY TO AFFECTED AREAS AS NEEDED FOR MILD PAIN, Normal      linaCLOtide (Linzess) 72 MCG CAPS Take 72 mcg by mouth daily, Starting Thu 2/18/2021, Normal montelukast (SINGULAIR) 10 mg tablet TAKE ONE TABLET BY MOUTH ONCE DAILY AT BEDTIME, Normal      Movantik 25 MG tablet Starting Fri 11/27/2020, Historical Med      Nutritional Supplements (Ensure Original) LIQD Take 1 Bottle by mouth 2 (two) times a day, Starting Tue 10/13/2020, Print      ondansetron (ZOFRAN) 4 mg tablet Take 1 tablet (4 mg total) by mouth every 8 (eight) hours as needed for nausea or vomiting, Starting Thu 9/24/2020, Normal      oxyCODONE-acetaminophen (PERCOCET)  mg per tablet Take 1 tablet by mouth every 6 (six) hours as needed for moderate painMax Daily Amount: 4 tablets, Starting Sat 4/10/2021, Normal      pantoprazole (PROTONIX) 40 mg tablet Take 1 tablet (40 mg total) by mouth 2 (two) times a day before meals, Starting Wed 8/26/2020, Normal      polyethylene glycol (GLYCOLAX) 17 GM/SCOOP powder DISSOLVE 17 GRAMS(ONE CAPFUL) IN 8 OUNCE OF WATER/JUICE & TAKE BY MOUTH DAILY AS DIRECTED *MAXIMUM ONE DOSE DAILY*, Normal      Probiotic Product (PROBIOTIC-10 PO) Take 1 tablet by mouth daily, Historical Med      QUEtiapine (SEROquel) 200 mg tablet Take 200 mg by mouth daily at bedtime, Historical Med      scopolamine (TRANSDERM-SCOP) 1 5 mg/3 days TD 72 hr patch Place 1 patch on the skin every third day, Starting Tue 3/17/2020, Normal      sucralfate (CARAFATE) 1 g/10 mL suspension Take 10 mL (1 g total) by mouth 4 (four) times a day (with meals and at bedtime), Starting Mon 12/28/2020, Normal      TiZANidine (ZANAFLEX) 4 MG capsule TAKE ONE CAPSULE BY MOUTH THREE TIMES A DAY, Normal      Toujeo Max SoloStar 300 units/mL CONCETRATED U-300 injection pen (2-unit dial) INJECT 22 UNITS UNDER THE SKIN DAILY, Starting Mon 2/22/2021, Normal       !! - Potential duplicate medications found  Please discuss with provider  No discharge procedures on file      PDMP Review       Value Time User    PDMP Reviewed  Yes 4/19/2021  9:28 PM Katarzyna Ramirez PA-C           ED Provider  Attending physically available and evaluated Lennox Marcial I managed the patient along with the ED Attending      Electronically Signed by         Koko Hare MD  04/25/21 0481

## 2021-04-26 ENCOUNTER — HOSPITAL ENCOUNTER (INPATIENT)
Facility: HOSPITAL | Age: 51
LOS: 1 days | Discharge: HOME | DRG: 690 | End: 2021-04-27
Attending: EMERGENCY MEDICINE | Admitting: INTERNAL MEDICINE
Payer: COMMERCIAL

## 2021-04-26 ENCOUNTER — OFFICE VISIT (OUTPATIENT)
Dept: FAMILY MEDICINE CLINIC | Facility: CLINIC | Age: 51
End: 2021-04-26

## 2021-04-26 VITALS
RESPIRATION RATE: 18 BRPM | TEMPERATURE: 97.6 F | BODY MASS INDEX: 32.01 KG/M2 | OXYGEN SATURATION: 100 % | DIASTOLIC BLOOD PRESSURE: 82 MMHG | HEART RATE: 85 BPM | WEIGHT: 175 LBS | SYSTOLIC BLOOD PRESSURE: 126 MMHG

## 2021-04-26 DIAGNOSIS — N12 PYELONEPHRITIS: Primary | ICD-10-CM

## 2021-04-26 DIAGNOSIS — N17.9 AKI (ACUTE KIDNEY INJURY) (HCC): ICD-10-CM

## 2021-04-26 PROBLEM — R82.79: Status: ACTIVE | Noted: 2021-04-26

## 2021-04-26 LAB
ALBUMIN SERPL BCP-MCNC: 3.8 G/DL (ref 3.5–5)
ALP SERPL-CCNC: 133 U/L (ref 46–116)
ALT SERPL W P-5'-P-CCNC: 27 U/L (ref 12–78)
ANION GAP SERPL CALCULATED.3IONS-SCNC: 12 MMOL/L (ref 4–13)
APTT PPP: 40 SECONDS (ref 23–37)
AST SERPL W P-5'-P-CCNC: 23 U/L (ref 5–45)
ATRIAL RATE: 80 BPM
BACTERIA UR CULT: NORMAL
BACTERIA UR QL AUTO: ABNORMAL /HPF
BASOPHILS # BLD AUTO: 0.01 THOUSANDS/ΜL (ref 0–0.1)
BASOPHILS NFR BLD AUTO: 0 % (ref 0–1)
BILIRUB SERPL-MCNC: 0.23 MG/DL (ref 0.2–1)
BILIRUB UR QL STRIP: NEGATIVE
BUN SERPL-MCNC: 24 MG/DL (ref 5–25)
CALCIUM SERPL-MCNC: 9.2 MG/DL (ref 8.3–10.1)
CHLORIDE SERPL-SCNC: 108 MMOL/L (ref 100–108)
CLARITY UR: CLEAR
CO2 SERPL-SCNC: 23 MMOL/L (ref 21–32)
COLOR UR: YELLOW
CREAT SERPL-MCNC: 2.53 MG/DL (ref 0.6–1.3)
EOSINOPHIL # BLD AUTO: 0.01 THOUSAND/ΜL (ref 0–0.61)
EOSINOPHIL NFR BLD AUTO: 0 % (ref 0–6)
ERYTHROCYTE [DISTWIDTH] IN BLOOD BY AUTOMATED COUNT: 12.9 % (ref 11.6–15.1)
GFR SERPL CREATININE-BSD FRML MDRD: 21 ML/MIN/1.73SQ M
GLUCOSE SERPL-MCNC: 113 MG/DL (ref 65–140)
GLUCOSE SERPL-MCNC: 81 MG/DL (ref 65–140)
GLUCOSE UR STRIP-MCNC: NEGATIVE MG/DL
HCT VFR BLD AUTO: 38.6 % (ref 34.8–46.1)
HGB BLD-MCNC: 12.7 G/DL (ref 11.5–15.4)
HGB UR QL STRIP.AUTO: ABNORMAL
IMM GRANULOCYTES # BLD AUTO: 0.02 THOUSAND/UL (ref 0–0.2)
IMM GRANULOCYTES NFR BLD AUTO: 0 % (ref 0–2)
INR PPP: 1.07 (ref 0.84–1.19)
KETONES UR STRIP-MCNC: NEGATIVE MG/DL
LACTATE SERPL-SCNC: 1 MMOL/L (ref 0.5–2)
LEUKOCYTE ESTERASE UR QL STRIP: NEGATIVE
LIPASE SERPL-CCNC: 189 U/L (ref 73–393)
LYMPHOCYTES # BLD AUTO: 0.58 THOUSANDS/ΜL (ref 0.6–4.47)
LYMPHOCYTES NFR BLD AUTO: 10 % (ref 14–44)
MCH RBC QN AUTO: 30.6 PG (ref 26.8–34.3)
MCHC RBC AUTO-ENTMCNC: 32.9 G/DL (ref 31.4–37.4)
MCV RBC AUTO: 93 FL (ref 82–98)
MONOCYTES # BLD AUTO: 0.39 THOUSAND/ΜL (ref 0.17–1.22)
MONOCYTES NFR BLD AUTO: 7 % (ref 4–12)
NEUTROPHILS # BLD AUTO: 4.7 THOUSANDS/ΜL (ref 1.85–7.62)
NEUTS SEG NFR BLD AUTO: 83 % (ref 43–75)
NITRITE UR QL STRIP: NEGATIVE
NON-SQ EPI CELLS URNS QL MICRO: ABNORMAL /HPF
NRBC BLD AUTO-RTO: 0 /100 WBCS
P AXIS: 49 DEGREES
PH UR STRIP.AUTO: 6.5 [PH] (ref 4.5–8)
PLATELET # BLD AUTO: 258 THOUSANDS/UL (ref 149–390)
PMV BLD AUTO: 9.7 FL (ref 8.9–12.7)
POTASSIUM SERPL-SCNC: 4.2 MMOL/L (ref 3.5–5.3)
PR INTERVAL: 150 MS
PROCALCITONIN SERPL-MCNC: <0.05 NG/ML
PROT SERPL-MCNC: 8 G/DL (ref 6.4–8.2)
PROT UR STRIP-MCNC: ABNORMAL MG/DL
PROTHROMBIN TIME: 13.7 SECONDS (ref 11.6–14.5)
QRS AXIS: -6 DEGREES
QRSD INTERVAL: 68 MS
QT INTERVAL: 376 MS
QTC INTERVAL: 433 MS
RBC # BLD AUTO: 4.15 MILLION/UL (ref 3.81–5.12)
RBC #/AREA URNS AUTO: ABNORMAL /HPF
SODIUM SERPL-SCNC: 143 MMOL/L (ref 136–145)
SP GR UR STRIP.AUTO: 1.02 (ref 1–1.03)
T WAVE AXIS: 58 DEGREES
TROPONIN I SERPL-MCNC: <0.02 NG/ML
UROBILINOGEN UR QL STRIP.AUTO: 0.2 E.U./DL
VENTRICULAR RATE: 80 BPM
WBC # BLD AUTO: 5.71 THOUSAND/UL (ref 4.31–10.16)
WBC #/AREA URNS AUTO: ABNORMAL /HPF

## 2021-04-26 PROCEDURE — 36415 COLL VENOUS BLD VENIPUNCTURE: CPT | Performed by: EMERGENCY MEDICINE

## 2021-04-26 PROCEDURE — 96376 TX/PRO/DX INJ SAME DRUG ADON: CPT

## 2021-04-26 PROCEDURE — 96365 THER/PROPH/DIAG IV INF INIT: CPT

## 2021-04-26 PROCEDURE — 84484 ASSAY OF TROPONIN QUANT: CPT | Performed by: EMERGENCY MEDICINE

## 2021-04-26 PROCEDURE — 85610 PROTHROMBIN TIME: CPT | Performed by: EMERGENCY MEDICINE

## 2021-04-26 PROCEDURE — 84145 PROCALCITONIN (PCT): CPT | Performed by: EMERGENCY MEDICINE

## 2021-04-26 PROCEDURE — 99223 1ST HOSP IP/OBS HIGH 75: CPT | Performed by: INTERNAL MEDICINE

## 2021-04-26 PROCEDURE — 87040 BLOOD CULTURE FOR BACTERIA: CPT | Performed by: EMERGENCY MEDICINE

## 2021-04-26 PROCEDURE — 80053 COMPREHEN METABOLIC PANEL: CPT | Performed by: EMERGENCY MEDICINE

## 2021-04-26 PROCEDURE — 96375 TX/PRO/DX INJ NEW DRUG ADDON: CPT

## 2021-04-26 PROCEDURE — 81001 URINALYSIS AUTO W/SCOPE: CPT

## 2021-04-26 PROCEDURE — 83690 ASSAY OF LIPASE: CPT | Performed by: EMERGENCY MEDICINE

## 2021-04-26 PROCEDURE — 85025 COMPLETE CBC W/AUTO DIFF WBC: CPT | Performed by: EMERGENCY MEDICINE

## 2021-04-26 PROCEDURE — 99285 EMERGENCY DEPT VISIT HI MDM: CPT | Performed by: EMERGENCY MEDICINE

## 2021-04-26 PROCEDURE — 83605 ASSAY OF LACTIC ACID: CPT | Performed by: EMERGENCY MEDICINE

## 2021-04-26 PROCEDURE — 82948 REAGENT STRIP/BLOOD GLUCOSE: CPT

## 2021-04-26 PROCEDURE — 99285 EMERGENCY DEPT VISIT HI MDM: CPT

## 2021-04-26 PROCEDURE — 85730 THROMBOPLASTIN TIME PARTIAL: CPT | Performed by: EMERGENCY MEDICINE

## 2021-04-26 PROCEDURE — 93005 ELECTROCARDIOGRAM TRACING: CPT

## 2021-04-26 PROCEDURE — 93010 ELECTROCARDIOGRAM REPORT: CPT | Performed by: INTERNAL MEDICINE

## 2021-04-26 PROCEDURE — 1036F TOBACCO NON-USER: CPT | Performed by: INTERNAL MEDICINE

## 2021-04-26 PROCEDURE — 99213 OFFICE O/P EST LOW 20 MIN: CPT | Performed by: INTERNAL MEDICINE

## 2021-04-26 RX ORDER — DEXTROAMPHETAMINE SACCHARATE, AMPHETAMINE ASPARTATE, DEXTROAMPHETAMINE SULFATE AND AMPHETAMINE SULFATE 2.5; 2.5; 2.5; 2.5 MG/1; MG/1; MG/1; MG/1
20 TABLET ORAL
Status: DISCONTINUED | OUTPATIENT
Start: 2021-04-27 | End: 2021-04-27 | Stop reason: HOSPADM

## 2021-04-26 RX ORDER — ONDANSETRON 2 MG/ML
4 INJECTION INTRAMUSCULAR; INTRAVENOUS EVERY 4 HOURS PRN
Status: DISCONTINUED | OUTPATIENT
Start: 2021-04-26 | End: 2021-04-27 | Stop reason: HOSPADM

## 2021-04-26 RX ORDER — ATORVASTATIN CALCIUM 10 MG/1
20 TABLET, FILM COATED ORAL DAILY
Status: DISCONTINUED | OUTPATIENT
Start: 2021-04-27 | End: 2021-04-27 | Stop reason: HOSPADM

## 2021-04-26 RX ORDER — SACCHAROMYCES BOULARDII 250 MG
250 CAPSULE ORAL 2 TIMES DAILY
Status: DISCONTINUED | OUTPATIENT
Start: 2021-04-26 | End: 2021-04-27 | Stop reason: HOSPADM

## 2021-04-26 RX ORDER — INSULIN GLARGINE 100 [IU]/ML
22 INJECTION, SOLUTION SUBCUTANEOUS
Status: DISCONTINUED | OUTPATIENT
Start: 2021-04-26 | End: 2021-04-27 | Stop reason: HOSPADM

## 2021-04-26 RX ORDER — FENTANYL CITRATE 50 UG/ML
50 INJECTION, SOLUTION INTRAMUSCULAR; INTRAVENOUS ONCE
Status: COMPLETED | OUTPATIENT
Start: 2021-04-26 | End: 2021-04-26

## 2021-04-26 RX ORDER — MONTELUKAST SODIUM 10 MG/1
10 TABLET ORAL
Status: DISCONTINUED | OUTPATIENT
Start: 2021-04-26 | End: 2021-04-27 | Stop reason: HOSPADM

## 2021-04-26 RX ORDER — ONDANSETRON 2 MG/ML
4 INJECTION INTRAMUSCULAR; INTRAVENOUS ONCE
Status: COMPLETED | OUTPATIENT
Start: 2021-04-26 | End: 2021-04-26

## 2021-04-26 RX ORDER — CALCIUM CARBONATE 200(500)MG
1000 TABLET,CHEWABLE ORAL DAILY PRN
Status: DISCONTINUED | OUTPATIENT
Start: 2021-04-26 | End: 2021-04-27 | Stop reason: HOSPADM

## 2021-04-26 RX ORDER — SODIUM CHLORIDE 9 MG/ML
100 INJECTION, SOLUTION INTRAVENOUS CONTINUOUS
Status: DISCONTINUED | OUTPATIENT
Start: 2021-04-26 | End: 2021-04-27 | Stop reason: HOSPADM

## 2021-04-26 RX ORDER — SCOLOPAMINE TRANSDERMAL SYSTEM 1 MG/1
1 PATCH, EXTENDED RELEASE TRANSDERMAL
Status: DISCONTINUED | OUTPATIENT
Start: 2021-04-27 | End: 2021-04-27 | Stop reason: HOSPADM

## 2021-04-26 RX ORDER — PANTOPRAZOLE SODIUM 40 MG/1
40 TABLET, DELAYED RELEASE ORAL
Status: DISCONTINUED | OUTPATIENT
Start: 2021-04-27 | End: 2021-04-27 | Stop reason: HOSPADM

## 2021-04-26 RX ORDER — AMLODIPINE BESYLATE 5 MG/1
5 TABLET ORAL DAILY
Status: DISCONTINUED | OUTPATIENT
Start: 2021-04-27 | End: 2021-04-26

## 2021-04-26 RX ORDER — LEVOFLOXACIN 750 MG/1
750 TABLET ORAL EVERY 24 HOURS
Qty: 5 TABLET | Refills: 0 | Status: SHIPPED | OUTPATIENT
Start: 2021-04-26 | End: 2021-04-27 | Stop reason: HOSPADM

## 2021-04-26 RX ORDER — BUPROPION HYDROCHLORIDE 150 MG/1
300 TABLET ORAL EVERY MORNING
Status: DISCONTINUED | OUTPATIENT
Start: 2021-04-27 | End: 2021-04-27 | Stop reason: HOSPADM

## 2021-04-26 RX ORDER — POLYETHYLENE GLYCOL 3350 17 G/17G
17 POWDER, FOR SOLUTION ORAL DAILY PRN
Status: DISCONTINUED | OUTPATIENT
Start: 2021-04-26 | End: 2021-04-27 | Stop reason: HOSPADM

## 2021-04-26 RX ORDER — OXYCODONE HYDROCHLORIDE AND ACETAMINOPHEN 5; 325 MG/1; MG/1
1 TABLET ORAL EVERY 6 HOURS PRN
Status: DISCONTINUED | OUTPATIENT
Start: 2021-04-26 | End: 2021-04-27 | Stop reason: HOSPADM

## 2021-04-26 RX ORDER — HYDRALAZINE HYDROCHLORIDE 20 MG/ML
5 INJECTION INTRAMUSCULAR; INTRAVENOUS EVERY 6 HOURS PRN
Status: DISCONTINUED | OUTPATIENT
Start: 2021-04-26 | End: 2021-04-27 | Stop reason: HOSPADM

## 2021-04-26 RX ORDER — LUBIPROSTONE 8 UG/1
8 CAPSULE, GELATIN COATED ORAL 2 TIMES DAILY WITH MEALS
Status: DISCONTINUED | OUTPATIENT
Start: 2021-04-27 | End: 2021-04-27 | Stop reason: HOSPADM

## 2021-04-26 RX ADMIN — FENTANYL CITRATE 50 MCG: 50 INJECTION, SOLUTION INTRAMUSCULAR; INTRAVENOUS at 19:55

## 2021-04-26 RX ADMIN — OXYCODONE HYDROCHLORIDE AND ACETAMINOPHEN 1 TABLET: 5; 325 TABLET ORAL at 22:40

## 2021-04-26 RX ADMIN — Medication 250 MG: at 22:41

## 2021-04-26 RX ADMIN — SODIUM CHLORIDE 1000 ML: 0.9 INJECTION, SOLUTION INTRAVENOUS at 17:28

## 2021-04-26 RX ADMIN — SODIUM CHLORIDE 100 ML/HR: 0.9 INJECTION, SOLUTION INTRAVENOUS at 21:42

## 2021-04-26 RX ADMIN — QUETIAPINE FUMARATE 150 MG: 25 TABLET ORAL at 22:41

## 2021-04-26 RX ADMIN — ONDANSETRON 4 MG: 2 INJECTION INTRAMUSCULAR; INTRAVENOUS at 19:14

## 2021-04-26 RX ADMIN — INSULIN GLARGINE 22 UNITS: 100 INJECTION, SOLUTION SUBCUTANEOUS at 22:40

## 2021-04-26 RX ADMIN — CEFTRIAXONE 1000 MG: 1 INJECTION, POWDER, FOR SOLUTION INTRAMUSCULAR; INTRAVENOUS at 17:30

## 2021-04-26 RX ADMIN — FENTANYL CITRATE 50 MCG: 50 INJECTION, SOLUTION INTRAMUSCULAR; INTRAVENOUS at 17:29

## 2021-04-27 VITALS
BODY MASS INDEX: 31.97 KG/M2 | WEIGHT: 169.31 LBS | TEMPERATURE: 96.7 F | DIASTOLIC BLOOD PRESSURE: 102 MMHG | HEIGHT: 61 IN | SYSTOLIC BLOOD PRESSURE: 152 MMHG | OXYGEN SATURATION: 100 % | HEART RATE: 71 BPM | RESPIRATION RATE: 18 BRPM

## 2021-04-27 PROBLEM — N12 PYELONEPHRITIS: Status: RESOLVED | Noted: 2021-04-26 | Resolved: 2021-04-27

## 2021-04-27 LAB
ANION GAP SERPL CALCULATED.3IONS-SCNC: 9 MMOL/L (ref 4–13)
BASOPHILS # BLD AUTO: 0.01 THOUSANDS/ΜL (ref 0–0.1)
BASOPHILS NFR BLD AUTO: 0 % (ref 0–1)
BUN SERPL-MCNC: 19 MG/DL (ref 5–25)
CALCIUM SERPL-MCNC: 8.8 MG/DL (ref 8.3–10.1)
CHLORIDE SERPL-SCNC: 111 MMOL/L (ref 100–108)
CO2 SERPL-SCNC: 24 MMOL/L (ref 21–32)
CREAT SERPL-MCNC: 1.98 MG/DL (ref 0.6–1.3)
EOSINOPHIL # BLD AUTO: 0.04 THOUSAND/ΜL (ref 0–0.61)
EOSINOPHIL NFR BLD AUTO: 1 % (ref 0–6)
ERYTHROCYTE [DISTWIDTH] IN BLOOD BY AUTOMATED COUNT: 13.1 % (ref 11.6–15.1)
GFR SERPL CREATININE-BSD FRML MDRD: 29 ML/MIN/1.73SQ M
GLUCOSE SERPL-MCNC: 76 MG/DL (ref 65–140)
HCT VFR BLD AUTO: 34.3 % (ref 34.8–46.1)
HGB BLD-MCNC: 11.2 G/DL (ref 11.5–15.4)
IMM GRANULOCYTES # BLD AUTO: 0.02 THOUSAND/UL (ref 0–0.2)
IMM GRANULOCYTES NFR BLD AUTO: 1 % (ref 0–2)
LYMPHOCYTES # BLD AUTO: 1.29 THOUSANDS/ΜL (ref 0.6–4.47)
LYMPHOCYTES NFR BLD AUTO: 30 % (ref 14–44)
MCH RBC QN AUTO: 30.9 PG (ref 26.8–34.3)
MCHC RBC AUTO-ENTMCNC: 32.7 G/DL (ref 31.4–37.4)
MCV RBC AUTO: 95 FL (ref 82–98)
MONOCYTES # BLD AUTO: 0.54 THOUSAND/ΜL (ref 0.17–1.22)
MONOCYTES NFR BLD AUTO: 13 % (ref 4–12)
NEUTROPHILS # BLD AUTO: 2.36 THOUSANDS/ΜL (ref 1.85–7.62)
NEUTS SEG NFR BLD AUTO: 55 % (ref 43–75)
NRBC BLD AUTO-RTO: 0 /100 WBCS
PLATELET # BLD AUTO: 206 THOUSANDS/UL (ref 149–390)
PMV BLD AUTO: 9.9 FL (ref 8.9–12.7)
POTASSIUM SERPL-SCNC: 3.6 MMOL/L (ref 3.5–5.3)
PROCALCITONIN SERPL-MCNC: <0.05 NG/ML
RBC # BLD AUTO: 3.62 MILLION/UL (ref 3.81–5.12)
SODIUM SERPL-SCNC: 144 MMOL/L (ref 136–145)
WBC # BLD AUTO: 4.26 THOUSAND/UL (ref 4.31–10.16)

## 2021-04-27 PROCEDURE — 80048 BASIC METABOLIC PNL TOTAL CA: CPT | Performed by: PHYSICIAN ASSISTANT

## 2021-04-27 PROCEDURE — 99239 HOSP IP/OBS DSCHRG MGMT >30: CPT | Performed by: PHYSICIAN ASSISTANT

## 2021-04-27 PROCEDURE — 84145 PROCALCITONIN (PCT): CPT | Performed by: INTERNAL MEDICINE

## 2021-04-27 PROCEDURE — 85025 COMPLETE CBC W/AUTO DIFF WBC: CPT | Performed by: PHYSICIAN ASSISTANT

## 2021-04-27 RX ORDER — AMLODIPINE BESYLATE 5 MG/1
5 TABLET ORAL DAILY
Status: DISCONTINUED | OUTPATIENT
Start: 2021-04-27 | End: 2021-04-27 | Stop reason: HOSPADM

## 2021-04-27 RX ORDER — SCOLOPAMINE TRANSDERMAL SYSTEM 1 MG/1
1 PATCH, EXTENDED RELEASE TRANSDERMAL
Qty: 10 PATCH | Refills: 0 | Status: SHIPPED | OUTPATIENT
Start: 2021-04-27

## 2021-04-27 RX ORDER — NITROFURANTOIN 25; 75 MG/1; MG/1
100 CAPSULE ORAL 2 TIMES DAILY
Qty: 10 CAPSULE | Refills: 0 | Status: SHIPPED | OUTPATIENT
Start: 2021-04-27 | End: 2021-05-01 | Stop reason: HOSPADM

## 2021-04-27 RX ADMIN — SODIUM CHLORIDE 100 ML/HR: 0.9 INJECTION, SOLUTION INTRAVENOUS at 07:27

## 2021-04-27 RX ADMIN — BUPROPION HYDROCHLORIDE 300 MG: 150 TABLET, FILM COATED, EXTENDED RELEASE ORAL at 09:18

## 2021-04-27 RX ADMIN — ATORVASTATIN CALCIUM 20 MG: 10 TABLET, FILM COATED ORAL at 09:18

## 2021-04-27 RX ADMIN — SCOPALAMINE 1 PATCH: 1 PATCH, EXTENDED RELEASE TRANSDERMAL at 09:18

## 2021-04-27 RX ADMIN — Medication 250 MG: at 09:18

## 2021-04-27 RX ADMIN — AMLODIPINE BESYLATE 5 MG: 5 TABLET ORAL at 11:05

## 2021-04-27 RX ADMIN — PANTOPRAZOLE SODIUM 40 MG: 40 TABLET, DELAYED RELEASE ORAL at 06:27

## 2021-04-27 RX ADMIN — OXYCODONE HYDROCHLORIDE AND ACETAMINOPHEN 1 TABLET: 5; 325 TABLET ORAL at 06:24

## 2021-04-27 RX ADMIN — DEXTROAMPHETAMINE SACCHARATE, AMPHETAMINE ASPARTATE, DEXTROAMPHETAMINE SULFATE AND AMPHETAMINE SULFATE 20 MG: 2.5; 2.5; 2.5; 2.5 TABLET ORAL at 09:17

## 2021-04-27 RX ADMIN — DEXTROAMPHETAMINE SACCHARATE, AMPHETAMINE ASPARTATE, DEXTROAMPHETAMINE SULFATE AND AMPHETAMINE SULFATE 20 MG: 2.5; 2.5; 2.5; 2.5 TABLET ORAL at 11:59

## 2021-04-27 RX ADMIN — LUBIPROSTONE 8 MCG: 8 CAPSULE, GELATIN COATED ORAL at 09:18

## 2021-04-27 NOTE — ASSESSMENT & PLAN NOTE
· Patient with alpha hemolytic strep positive urine culture on 4/23  · Likely contaminant, however will treat outpatient with Macrobid 100 mg b i d   X5 days  · Report back to the emergency department with uncontrolled pain, nausea, vomiting

## 2021-04-27 NOTE — ASSESSMENT & PLAN NOTE
Lab Results   Component Value Date    EGFR 21 04/26/2021    EGFR 21 04/25/2021    EGFR 26 04/23/2021    CREATININE 2 53 (H) 04/26/2021    CREATININE 2 59 (H) 04/25/2021    CREATININE 2 17 (H) 04/23/2021   · According to patient, states that she takes amlodipine  · However according to history states that she has an allergy to amlodipine, will hold at this time  · Hydralazine p r n   SBP greater than 160, DBP greater than 110

## 2021-04-27 NOTE — PLAN OF CARE
Problem: Potential for Falls  Goal: Patient will remain free of falls  Description: INTERVENTIONS:  - Assess patient frequently for physical needs  -  Identify cognitive and physical deficits and behaviors that affect risk of falls    -  [place] fall precautions as indicated by assessment   - Educate patient/family on patient safety including physical limitations  - Instruct patient to call for assistance with activity based on assessment  - Modify environment to reduce risk of injury  - Consider OT/PT consult to assist with strengthening/mobility  Outcome: Progressing     Problem: Prexisting or High Potential for Compromised Skin Integrity  Goal: Skin integrity is maintained or improved  Description: INTERVENTIONS:  - Identify patients at risk for skin breakdown  - Assess and monitor skin integrity  - Assess and monitor nutrition and hydration status  - Monitor labs   - Assess for incontinence   - Turn and reposition patient  - Assist with mobility/ambulation  - Relieve pressure over bony prominences  - Avoid friction and shearing  - Provide appropriate hygiene as needed including keeping skin clean and dry  - Evaluate need for skin moisturizer/barrier cream  - Collaborate with interdisciplinary team   - Patient/family teaching  - Consider wound care consult   Outcome: Progressing     Problem: PAIN - ADULT  Goal: Verbalizes/displays adequate comfort level or baseline comfort level  Description: Interventions:  - Encourage patient to monitor pain and request assistance  - Assess pain using appropriate pain scale  - Administer analgesics based on type and severity of pain and evaluate response  - Implement non-pharmacological measures as appropriate and evaluate response  - Consider cultural and social influences on pain and pain management  - Notify physician/advanced practitioner if interventions unsuccessful or patient reports new pain  Outcome: Progressing     Problem: INFECTION - ADULT  Goal: Absence or prevention of progression during hospitalization  Description: INTERVENTIONS:  - Assess and monitor for signs and symptoms of infection  - Monitor lab/diagnostic results  - Monitor all insertion sites, i e  indwelling lines, tubes, and drains  - Monitor endotracheal if appropriate and nasal secretions for changes in amount and color  - [place] appropriate cooling/warming therapies per order  - Administer medications as ordered  - Instruct and encourage patient and family to use good hand hygiene technique  - Identify and instruct in appropriate isolation precautions for identified infection/condition  Outcome: Progressing  Goal: Absence of fever/infection during neutropenic period  Description: INTERVENTIONS:  - Monitor WBC    Outcome: Progressing     Problem: SAFETY ADULT  Goal: Patient will remain free of falls  Description: INTERVENTIONS:  - Assess patient frequently for physical needs  -  Identify cognitive and physical deficits and behaviors that affect risk of falls    -  [place] fall precautions as indicated by assessment   - Educate patient/family on patient safety including physical limitations  - Instruct patient to call for assistance with activity based on assessment  - Modify environment to reduce risk of injury  - Consider OT/PT consult to assist with strengthening/mobility  Outcome: Progressing  Goal: Maintain or return to baseline ADL function  Description: INTERVENTIONS:  -  Assess patient's ability to carry out ADLs; assess patient's baseline for ADL function and identify physical deficits which impact ability to perform ADLs (bathing, care of mouth/teeth, toileting, grooming, dressing, etc )  - Assess/evaluate cause of self-care deficits   - Assess range of motion  - Assess patient's mobility; develop plan if impaired  - Assess patient's need for assistive devices and provide as appropriate  - Encourage maximum independence but intervene and supervise when necessary  - Involve family in performance of ADLs  - Assess for home care needs following discharge   - Consider OT consult to assist with ADL evaluation and planning for discharge  - Provide patient education as appropriate  Outcome: Progressing  Goal: Maintain or return mobility status to optimal level  Description: INTERVENTIONS:  - Assess patient's baseline mobility status (ambulation, transfers, stairs, etc )    - Identify cognitive and physical deficits and behaviors that affect mobility  - Identify mobility aids required to assist with transfers and/or ambulation (gait belt, sit-to-stand, lift, walker, cane, etc )  - [place] fall precautions as indicated by assessment  - Record patient progress and toleration of activity level on Mobility SBAR; progress patient to next Phase/Stage  - Instruct patient to call for assistance with activity based on assessment  - Consider rehabilitation consult to assist with strengthening/weightbearing, etc   Outcome: Progressing     Problem: DISCHARGE PLANNING  Goal: Discharge to home or other facility with appropriate resources  Description: INTERVENTIONS:  - Identify barriers to discharge w/patient and caregiver  - Arrange for needed discharge resources and transportation as appropriate  - Identify discharge learning needs (meds, wound care, etc )  - Arrange for interpretive services to assist at discharge as needed  - Refer to Case Management Department for coordinating discharge planning if the patient needs post-hospital services based on physician/advanced practitioner order or complex needs related to functional status, cognitive ability, or social support system  Outcome: Progressing     Problem: Knowledge Deficit  Goal: Patient/family/caregiver demonstrates understanding of disease process, treatment plan, medications, and discharge instructions  Description: Complete learning assessment and assess knowledge base    Interventions:  - Provide teaching at level of understanding  - Provide teaching via preferred learning methods  Outcome: Progressing

## 2021-04-27 NOTE — ASSESSMENT & PLAN NOTE
· With a history of mixed IBS  · Patient reports approximately 4 days of diarrhea, states that it began to occur when she started antibiotics  · Will give probiotics while inpatient

## 2021-04-27 NOTE — ASSESSMENT & PLAN NOTE
Lab Results   Component Value Date    EGFR 29 04/27/2021    EGFR 21 04/26/2021    EGFR 21 04/25/2021    CREATININE 1 98 (H) 04/27/2021    CREATININE 2 53 (H) 04/26/2021    CREATININE 2 59 (H) 04/25/2021   · Patient's initial allergy list included amlodipine stating that it caused hives  · Patient states that despite this, she has been taking amlodipine for over a year  · Amlodipine was initially hold when patient was admitted, blood pressure most recently 152/102-suspect likely secondary to not taking antihypertensive  · Will update patient's allergy list and administer amlodipine 5 mg prior to discharge

## 2021-04-27 NOTE — PROGRESS NOTES
Assessment/Plan:    Pyelonephritis  - Patient has now been seen twice in the Emergency department for progressively worsening left sided flank, pain, nausea, vomiting and urinary symptoms   - Urinalysis from 4/23 notable for white blood cells and occasional bacteria with urine culture showing 30,000-39,000 cfu/ml Alpha Hemolytic Streptococcus  CT abdomen and pelvis did not show any evidence of pyelonephritis but was positive for colonic diverticulosis without acute diverticulitis  - Patient initially discharged with Ciprofloxacin which was changed to [facility] however patient is unable to obtain this medication from the pharmacy  - Will trial patient on Levofloxacin for a broader spectrum coverage; patient advised to return should symptoms fail to resolve or worsen  ED precautions reviewed     Diagnoses and all orders for this visit:    Pyelonephritis  -     levofloxacin (LEVAQUIN) 750 mg tablet; Take 1 tablet (750 mg total) by mouth every 24 hours for 5 days          Subjective:      Patient ID: Liz Lyons is a 48 y o  female  HPI  Liz Lyons is a 48year old female with a past medical history of type 2 diabetes mellitus, chronic kidney disease and hypertension who presents today for an ED follow up visit  Patient was seen in the Emergency department twice over the weekend duet to left sided flank pain, nausea, vomiting and urinary frequency and urgency  Patient was diagnosed with pyelonephritis during the first ED visit and discharged with ciprofloxacin  Initially patient improved with antibiotics but returned to the ED 2 days later with worsening pain  Patient's antibiotic was changed to [facility] however per patient's pharmacy this antibiotic would not be ready until 4/27  Patient reports that she is still having persistent left sided flank pain with subjective fever and chills as well as nausea  Review of Systems   Constitutional: Positive for appetite change, chills and fever  HENT: Negative  Eyes: Negative  Respiratory: Negative  Cardiovascular: Negative  Gastrointestinal: Positive for nausea  Genitourinary: Negative  Musculoskeletal: Negative  Skin: Negative  Neurological: Negative  Psychiatric/Behavioral: Negative  Objective:      /82 (BP Location: Right arm, Patient Position: Sitting, Cuff Size: Standard)   Pulse 85   Temp 97 6 °F (36 4 °C) (Temporal)   Resp 18   Wt 79 4 kg (175 lb)   LMP  (LMP Unknown)   SpO2 100%   BMI 32 01 kg/m²          Physical Exam  Constitutional:       General: She is not in acute distress  Appearance: Normal appearance  She is not ill-appearing  HENT:      Head: Normocephalic and atraumatic  Eyes:      General:         Right eye: No discharge  Left eye: No discharge  Extraocular Movements: Extraocular movements intact  Cardiovascular:      Rate and Rhythm: Normal rate  Pulmonary:      Effort: Pulmonary effort is normal  No respiratory distress  Abdominal:      Tenderness: There is left CVA tenderness  Neurological:      General: No focal deficit present  Mental Status: She is alert and oriented to person, place, and time     Psychiatric:         Mood and Affect: Mood normal          Behavior: Behavior normal

## 2021-04-27 NOTE — PLAN OF CARE
Problem: Potential for Falls  Goal: Patient will remain free of falls  Description: INTERVENTIONS:  - Assess patient frequently for physical needs  -  Identify cognitive and physical deficits and behaviors that affect risk of falls    -  [place] fall precautions as indicated by assessment   - Educate patient/family on patient safety including physical limitations  - Instruct patient to call for assistance with activity based on assessment  - Modify environment to reduce risk of injury  - Consider OT/PT consult to assist with strengthening/mobility  Outcome: Progressing     Problem: Prexisting or High Potential for Compromised Skin Integrity  Goal: Skin integrity is maintained or improved  Description: INTERVENTIONS:  - Identify patients at risk for skin breakdown  - Assess and monitor skin integrity  - Assess and monitor nutrition and hydration status  - Monitor labs   - Assess for incontinence   - Turn and reposition patient  - Assist with mobility/ambulation  - Relieve pressure over bony prominences  - Avoid friction and shearing  - Provide appropriate hygiene as needed including keeping skin clean and dry  - Evaluate need for skin moisturizer/barrier cream  - Collaborate with interdisciplinary team   - Patient/family teaching  - Consider wound care consult   Outcome: Progressing     Problem: PAIN - ADULT  Goal: Verbalizes/displays adequate comfort level or baseline comfort level  Description: Interventions:  - Encourage patient to monitor pain and request assistance  - Assess pain using appropriate pain scale  - Administer analgesics based on type and severity of pain and evaluate response  - Implement non-pharmacological measures as appropriate and evaluate response  - Consider cultural and social influences on pain and pain management  - Notify physician/advanced practitioner if interventions unsuccessful or patient reports new pain  Outcome: Progressing     Problem: INFECTION - ADULT  Goal: Absence or prevention of progression during hospitalization  Description: INTERVENTIONS:  - Assess and monitor for signs and symptoms of infection  - Monitor lab/diagnostic results  - Monitor all insertion sites, i e  indwelling lines, tubes, and drains  - Monitor endotracheal if appropriate and nasal secretions for changes in amount and color  - [place] appropriate cooling/warming therapies per order  - Administer medications as ordered  - Instruct and encourage patient and family to use good hand hygiene technique  - Identify and instruct in appropriate isolation precautions for identified infection/condition  Outcome: Progressing  Goal: Absence of fever/infection during neutropenic period  Description: INTERVENTIONS:  - Monitor WBC    Outcome: Progressing     Problem: SAFETY ADULT  Goal: Patient will remain free of falls  Description: INTERVENTIONS:  - Assess patient frequently for physical needs  -  Identify cognitive and physical deficits and behaviors that affect risk of falls    -  [place] fall precautions as indicated by assessment   - Educate patient/family on patient safety including physical limitations  - Instruct patient to call for assistance with activity based on assessment  - Modify environment to reduce risk of injury  - Consider OT/PT consult to assist with strengthening/mobility  Outcome: Progressing  Goal: Maintain or return to baseline ADL function  Description: INTERVENTIONS:  -  Assess patient's ability to carry out ADLs; assess patient's baseline for ADL function and identify physical deficits which impact ability to perform ADLs (bathing, care of mouth/teeth, toileting, grooming, dressing, etc )  - Assess/evaluate cause of self-care deficits   - Assess range of motion  - Assess patient's mobility; develop plan if impaired  - Assess patient's need for assistive devices and provide as appropriate  - Encourage maximum independence but intervene and supervise when necessary  - Involve family in performance of ADLs  - Assess for home care needs following discharge   - Consider OT consult to assist with ADL evaluation and planning for discharge  - Provide patient education as appropriate  Outcome: Progressing  Goal: Maintain or return mobility status to optimal level  Description: INTERVENTIONS:  - Assess patient's baseline mobility status (ambulation, transfers, stairs, etc )    - Identify cognitive and physical deficits and behaviors that affect mobility  - Identify mobility aids required to assist with transfers and/or ambulation (gait belt, sit-to-stand, lift, walker, cane, etc )  - [place] fall precautions as indicated by assessment  - Record patient progress and toleration of activity level on Mobility SBAR; progress patient to next Phase/Stage  - Instruct patient to call for assistance with activity based on assessment  - Consider rehabilitation consult to assist with strengthening/weightbearing, etc   Outcome: Progressing     Problem: DISCHARGE PLANNING  Goal: Discharge to home or other facility with appropriate resources  Description: INTERVENTIONS:  - Identify barriers to discharge w/patient and caregiver  - Arrange for needed discharge resources and transportation as appropriate  - Identify discharge learning needs (meds, wound care, etc )  - Arrange for interpretive services to assist at discharge as needed  - Refer to Case Management Department for coordinating discharge planning if the patient needs post-hospital services based on physician/advanced practitioner order or complex needs related to functional status, cognitive ability, or social support system  Outcome: Progressing     Problem: Knowledge Deficit  Goal: Patient/family/caregiver demonstrates understanding of disease process, treatment plan, medications, and discharge instructions  Description: Complete learning assessment and assess knowledge base    Interventions:  - Provide teaching at level of understanding  - Provide teaching via preferred learning methods  Outcome: Progressing

## 2021-04-27 NOTE — ASSESSMENT & PLAN NOTE
Lab Results   Component Value Date    EGFR 21 04/26/2021    EGFR 21 04/25/2021    EGFR 26 04/23/2021    CREATININE 2 53 (H) 04/26/2021    CREATININE 2 59 (H) 04/25/2021    CREATININE 2 17 (H) 04/23/2021   · Appears that the patient's baseline creatinine since July 2020 has run from 1 5-2 1  · Suspect MARY is currently secondary to dehydration this patient reports decreased oral intake and vomiting in addition to active pyelonephritis  · IV normal saline  · Recheck BMP in [place]

## 2021-04-27 NOTE — ASSESSMENT & PLAN NOTE
· Patient with chronic pain  · Maintained on Percocet  by PCP  · PDMP reviewed  · Follow-up with PCP

## 2021-04-27 NOTE — ASSESSMENT & PLAN NOTE
· Patient presents to the emergency department for the 3rd time since 04/23 with left-sided flank pain, and progressive nausea/vomiting  · Urine culture on 03/23 showed alpha hemolytic Streptococcus  · Urinalysis on arrival to emergency department with negative leukocytes and nitrites today- showing improvement from UA's was done on 04/23 and 4/25 min however secondary to patient's intractable nausea, vomiting, and significant left-sided back pain patient will be admitted for outpatient failure of pyelonephritis treatment  · Received 1 g IV Rocephin in emergency department  · Will continue with IV Rocephin while inpatient  · Patient without leukocytosis, afebrile  · Monitor white blood cell and fever curve  · CBC in a m    · Blood cultures pending

## 2021-04-27 NOTE — ASSESSMENT & PLAN NOTE
Lab Results   Component Value Date    HGBA1C 6 0 02/15/2021       No results for input(s): POCGLU in the last 72 hours  Blood Sugar Average: Last 72 hrs:  ·  patient reports using 20 units basal insulin HS, will continue while inpatient  · Uses 6 units NovoLog t i d  With meals, will transition to sliding scale while patient is inpatient  · Blood sugars a c   Hs  · Hypoglycemia protocol

## 2021-04-27 NOTE — H&P
[address] [place] [address]  H&P- Laurie Storey 1970, 48 y o  female MRN: [id number]  Unit/Bed#: ED 16 Encounter: [id number]  Primary Care Provider: Lucrecia Cobb PA-C   Date and time admitted to hospital: 4/26/2021  4:49 PM    * Pyelonephritis  Assessment & Plan  · Patient presents to the emergency department for the 3rd time since 04/23 with left-sided flank pain, and progressive nausea/vomiting  · Urine culture on 03/23 showed alpha hemolytic Streptococcus  · Urinalysis on arrival to emergency department with negative leukocytes and nitrites today- showing improvement from UA's was done on 04/23 and 4/25 min however secondary to patient's intractable nausea, vomiting, and significant left-sided back pain patient will be admitted for outpatient failure of pyelonephritis treatment  · Received 1 g IV Rocephin in emergency department  · Will continue with IV Rocephin while inpatient  · Patient without leukocytosis, afebrile  · Monitor white blood cell and fever curve  · CBC in a m  · Blood cultures pending    Acute renal failure superimposed on stage 4 chronic kidney disease [facility])  Assessment & Plan  Lab Results   Component Value Date    EGFR 21 04/26/2021    EGFR 21 04/25/2021    EGFR 26 04/23/2021    CREATININE 2 53 (H) 04/26/2021    CREATININE 2 59 (H) 04/25/2021    CREATININE 2 17 (H) 04/23/2021   · Appears that the patient's baseline creatinine since July 2020 has run from 1 5-2 1  · Suspect MARY is currently secondary to dehydration this patient reports decreased oral intake and vomiting in addition to active pyelonephritis  · IV normal saline  · Recheck BMP in a m      Alpha-hemolytic Streptococcus positive urine culture  Assessment & Plan  · Patient with alpha hemolytic strep positive urine culture on 4/23  · Being treated with IV Rocephin    Vaginal atrophy  Assessment & Plan  · Patient followed closely by OBGYN  · Continue Premarin 1 g    Attention deficit hyperactivity disorder (ADHD)  Assessment & Plan  · Patient maintained on Adderall 20 mg b i d  By PCP  · PDMP reviewed    IBS (irritable bowel syndrome)  Assessment & Plan  · With a history of mixed IBS  · Patient reports approximately 4 days of diarrhea, states that it began to occur when she started antibiotics  · Will give probiotics while inpatient    Chronic narcotic use  Assessment & Plan  · Patient with chronic pain  · Maintained on Percocet  by PCP  · PDMP reviewed  · Will continue with pain management while inpatient    Gastroesophageal reflux disease  Assessment & Plan  · Continue Protonix 40 mg b i d  Bipolar 1 disorder (HCC)  Assessment & Plan  · Continue Wellbutrin 300 mg q a m  · Continue Seroquel 150 mg HS    Benign hypertension with CKD (chronic kidney disease) stage IV [facility])  Assessment & Plan  Lab Results   Component Value Date    EGFR 21 04/26/2021    EGFR 21 04/25/2021    EGFR 26 04/23/2021    CREATININE 2 53 (H) 04/26/2021    CREATININE 2 59 (H) 04/25/2021    CREATININE 2 17 (H) 04/23/2021   · According to patient, states that she takes amlodipine  · However according to history states that she has an allergy to amlodipine, will hold at this time  · Hydralazine p r n  SBP greater than 160, DBP greater than 110    Dyslipidemia  Assessment & Plan  · Atorvastatin 20 mg q d  Type 2 diabetes mellitus with renal complication [facility])  Assessment & Plan  Lab Results   Component Value Date    HGBA1C 6 0 02/15/2021       No results for input(s): POCGLU in the last 72 hours  Blood Sugar Average: Last 72 hrs:  ·  patient reports using 20 units basal insulin HS, will continue while inpatient  · Uses 6 units NovoLog t i d  With meals, will transition to sliding scale while patient is inpatient  · Blood sugars a c  Hs  · Hypoglycemia protocol      VTE Prophylaxis: Low risk  / sequential compression device   Code Status:  Full code  POLST: POLST form is not discussed and not completed at this time    Discussion with family:  Discussed plan of care with patient, answered any questions    Anticipated Length of Stay:  Patient will be admitted on an Inpatient basis with an anticipated length of stay of  greater than 2 midnights  Justification for Hospital Stay:  Pyelonephritis, failed outpatient treatment    Total Time for Visit, including Counseling / Coordination of Care: 60 minutes  Greater than 50% of this total time spent on direct patient counseling and coordination of care  Chief Complaint:   Flank pain     History of Present Illness:    Benitez Cook is a 48 y o  female with past medical history of insulin-dependent diabetes, hypertension, IBS, GERD, dyslipidemia, chronic narcotic use for chronic pain, Justa's granulomatosis who presents with left-sided flank pain x4 days  This is the patient's 3rd emergency room department visit in the last 4 days  Was seen at [facility] emergency department on 03/23 very was revealed the patient had pyelonephritis  The patient was discharged with a course of ciprofloxacin  Despite taking the ciprofloxacin, the patient remained with flank pain and significant nausea and vomiting which prompted her to be evaluated in the emergency department at [facility] on 03/25  At that time it was noted that she had MARY, as well as pyelonephritis, the patient states that she felt better after administration of pain medication and IV antibiotics and thus was discharged  The patient presents to [facility] emergency department today for intractable nausea and vomiting as well as left-sided flank pain that has not gone away despite outpatient antibiotic treatment  Upon arrival to the emergency department, the patient received IV Rocephin    The patient's UA was improved from previous 2 visits, however given her intractable nausea and vomiting and being unable to keep her antibiotics down, she will be admitted to the medicine service for further management of her pyelonephritis  Review of Systems:    Review of Systems   Constitutional: Positive for chills  Negative for diaphoresis, fatigue and fever  HENT: Negative for ear pain and sore throat  Eyes: Negative for pain and visual disturbance  Respiratory: Negative for cough and shortness of breath  Cardiovascular: Negative for chest pain, palpitations and leg swelling  Gastrointestinal: Positive for diarrhea, nausea and vomiting  Negative for abdominal pain and constipation  Endocrine: Negative for polydipsia and polyphagia  Genitourinary: Positive for urgency  Negative for difficulty urinating, dysuria, frequency and hematuria  Musculoskeletal: Negative for arthralgias and back pain  Left flank pain   Skin: Negative for color change and rash  Neurological: Negative for seizures and syncope  All other systems reviewed and are negative        Past Medical and Surgical History:     Past Medical History:   Diagnosis Date    ADHD     Anemia of chronic disease     Anxiety     Asthma     Asthma     Borderline personality disorder (HCC)     Cataplexy     Chronic abdominal pain     CKD (chronic kidney disease) stage 3, GFR 30-59 ml/min (HCC)     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 [facility]     [facility]     Diabetes mellitus [facility]     DVT (deep venous thrombosis) (HCC)     GERD (gastroesophageal reflux disease)     History of acute pancreatitis     felt secondary to Bactrim    History of transfusion     HTN (hypertension)     Hypertension     Liver disease     fatty liver    Microscopic polyangiitis (HCC)     Morbid obesity (HCC)     MPA (microscopic polyangiitis) (HCC)     Ovarian cyst     PTSD (post-traumatic stress disorder)     Renal disorder     Self-inflicted injury     self inflicted skin wounds    Wegener's granulomatosis with renal involvement ([facility] ) 2015       Past Surgical History:   Procedure Laterality Date    COLONOSCOPY      ESOPHAGOGASTRODUODENOSCOPY  09/11/2015 mild antral gastritis    GASTRIC STIMULATOR IMPLANT SURGERY  06/25/2020    [place] COLONOSCOPY FLX DX W/COLLJ SPEC WHEN PFRMD N/A 12/14/2018    Procedure: COLONOSCOPY with polypectomy;  Surgeon: Lin Hoffman MD;  Location: [facility]; Service: Gastroenterology    [place] ESOPHAGOGASTRODUODENOSCOPY TRANSORAL DIAGNOSTIC N/A 12/14/2018    Procedure: ESOPHAGOGASTRODUODENOSCOPY (EGD) with biopsy;  Surgeon: Lin Hoffman MD;  Location: [facility]; Service: Gastroenterology    RELEASE SCAR CONTRACTURE / GRAFT REPAIRS OF HAND Bilateral     UPPER GASTROINTESTINAL ENDOSCOPY  12/26/2019       Meds/Allergies:    Prior to Admission medications    Medication Sig Start Date End Date Taking?  Authorizing Provider   albuterol (2 5 mg/3 mL) 0 083 % nebulizer solution Take 1 vial (2 5 mg total) by nebulization every 6 (six) hours as needed for wheezing or shortness of breath 1/11/21  Yes Katarzyna Thrasher PA-C   albuterol (PROVENTIL HFA,VENTOLIN HFA) 90 mcg/act inhaler Inhale 2 puffs every 6 (six) hours as needed for wheezing or shortness of breath 3/17/21  Yes Katarzyna Thrasher PA-C   Alcohol Swabs (ALCOHOL PADS) 70 % PADS by Does not apply route 4 (four) times a day 6/7/19  Yes Rosie Bagley PA-C   amLODIPine (NORVASC) 5 mg tablet TAKE ONE TABLET BY MOUTH ONCE DAILY 12/29/20  Yes Radha Gregory DO   amphetamine-dextroamphetamine (ADDERALL XR) 20 MG 24 hr capsule Take 1 capsule (20 mg total) by mouth 2 (two) times a dayMax Daily Amount: 40 mg 4/19/21  Yes Katarzyna Thrasher PA-C   atorvastatin (LIPITOR) 20 mg tablet Take 1 tablet (20 mg total) by mouth daily 3/3/21  Yes Katarzyna Thrasher PA-C   Blood Glucose Monitoring Suppl (FREESTYLE LITE) DEIRDRE by Does not apply route daily 5/21/19  Yes Rosie Bagley PA-C   buPROPion (WELLBUTRIN XL) 150 mg 24 hr tablet bupropion HCl  mg 24 hr tablet, extended release   Yes Historical Provider, MD   buPROPion (WELLBUTRIN XL) 300 mg 24 hr tablet Take 300 mg by mouth every morning 12/4/20  Yes Historical Provider, MD   cefpodoxime (VANTIN) 200 mg tablet Take 1 tablet (200 mg total) by mouth 2 (two) times a day for 14 days 4/25/21 5/9/21 Yes Nichelle Coronel MD   conjugated estrogens (Premarin) vaginal cream INSERT 1 GRAM PER VAGINA DAILY AT BEDTIME  FOR TWO WEEKS AND THEN CHANGE TO 1 GRAM TWICE WEEKLY 4/6/21  Yes Nita Umanzor DO   cycloSPORINE (RESTASIS) 0 05 % ophthalmic emulsion Administer 1 drop to both eyes 2 (two) times a day   Yes Historical Provider, MD   dexlansoprazole (Dexilant) 60 MG capsule Take 1 capsule (60 mg total) by mouth daily 2/18/21  Yes Alex Ruiz PA-C   dicyclomine (BENTYL) 10 mg capsule  11/27/20  Yes Historical Provider, MD   docusate sodium (COLACE) 100 mg capsule Take 1 capsule (100 mg total) by mouth 2 (two) times a day 11/19/20  Yes Katarzyna Thrasher PA-C   Easy Comfort Lancets MISC USE TO TEST THE BLOOD SUGAR THREE TIMES A DAY 3/1/21  Yes Katarzyna Thrasher PA-C   estradiol (ESTRACE) 0 1 mg/g vaginal cream Insert 2 g into the vagina daily 4/6/21  Yes Nita Umanzor DO   FREESTYLE LITE test strip USE TO TEST THE BLOOD SUGAR THREE TIMES A DAY 4/1/21  Yes Katarzyna Thrasher PA-C   hydrocortisone 1 % cream Apply topically 4 (four) times a day as needed for irritation 4/13/21  Yes Katarzyna Thrasher PA-C   insulin aspart (NovoLOG FlexPen) 100 UNIT/ML injection pen Inject 6 Units under the skin 3 (three) times a day with meals 9/24/20  Yes Katarzyna Thrasher PA-C   Insulin Pen Needle (PEN NEEDLES) 31G X 8 MM MISC Inject 1 Stick under the skin 4 (four) times a day (with meals and at bedtime) 3/28/18  Yes Paris Castillo PA-C   levofloxacin (LEVAQUIN) 750 mg tablet Take 1 tablet (750 mg total) by mouth every 24 hours for 5 days 4/26/21 5/1/21 Yes Kimberly Enriquez MD   lidocaine (XYLOCAINE) 5 % ointment APPLY TOPICALLY 2GM TWICE A DAY TO AFFECTED AREAS AS NEEDED FOR MILD PAIN 12/10/20  Yes Katarzyna Thrasher PA-C   linaCLOtide (Linzess) 72 MCG CAPS Take 72 mcg by mouth daily 2/18/21  Yes Bobo Foster PA-C   montelukast (SINGULAIR) 10 mg tablet TAKE ONE TABLET BY MOUTH ONCE DAILY AT BEDTIME 9/24/20  Yes Katarzyna Thrasher PA-C   Movantik 25 MG tablet  11/27/20  Yes Historical Provider, MD   Nutritional Supplements (Ensure Original) LIQD Take 1 Bottle by mouth 2 (two) times a day 10/13/20  Yes Katarzyna Thrasher PA-C   ondansetron (ZOFRAN) 4 mg tablet Take 1 tablet (4 mg total) by mouth every 8 (eight) hours as needed for nausea or vomiting 9/24/20  Yes Katarzyna Thrasher PA-C   oxyCODONE-acetaminophen (PERCOCET)  mg per tablet Take 1 tablet by mouth every 6 (six) hours as needed for moderate painMax Daily Amount: 4 tablets 4/10/21  Yes Katarzyna Thrasher PA-C   pantoprazole (PROTONIX) 40 mg tablet Take 1 tablet (40 mg total) by mouth 2 (two) times a day before meals 8/26/20  Yes Jane Soria PA-C   polyethylene glycol (GLYCOLAX) 17 GM/SCOOP powder DISSOLVE 17 GRAMS(ONE CAPFUL) IN 8 OUNCE OF WATER/JUICE & TAKE BY MOUTH DAILY AS DIRECTED *MAXIMUM ONE DOSE DAILY* 1/31/21  Yes Katarzyna Thrasher PA-C   Probiotic Product (PROBIOTIC-10 PO) Take 1 tablet by mouth daily   Yes Historical Provider, MD   QUEtiapine (SEROquel) 200 mg tablet Take 200 mg by mouth daily at bedtime   Yes Historical Provider, MD   scopolamine (TRANSDERM-SCOP) 1 5 mg/3 days TD 72 hr patch Place 1 patch on the skin every third day 3/17/20  Yes Lawrence Shelton PA-C   sucralfate (CARAFATE) 1 g/10 mL suspension Take 10 mL (1 g total) by mouth 4 (four) times a day (with meals and at bedtime) 12/28/20  Yes Katarzyna Thrasher PA-C   TiZANidine (ZANAFLEX) 4 MG capsule TAKE ONE CAPSULE BY MOUTH THREE TIMES A DAY 4/12/21  Yes Katarzyna Thrasher PA-C   Toujeo Max SoloStar 300 units/mL CONCETRATED U-300 injection pen (2-unit dial) INJECT 22 UNITS UNDER THE SKIN DAILY 2/22/21  Yes Katarzyna Thrasher PA-C   ciprofloxacin (CIPRO) 500 mg tablet Take 1 tablet (500 mg total) by mouth 2 (two) times a day for 7 days 4/23/21 4/26/21 Andrae Lombardi PA-C     I have reviewed home medications with patient personally  Allergies: Allergies   Allergen Reactions    Prozac [Fluoxetine Hcl]      SI    Bactrim [Sulfamethoxazole-Trimethoprim]      Pt "They think that is what cause the pancreatitis"     Lamictal [Lamotrigine] GI Intolerance    Lithium Other (See Comments)    Amlodipine Hives    Haldol [Haloperidol] Other (See Comments)     "I don't like it"    Ibuprofen     Lexapro [Escitalopram Oxalate] Rash    Navane [Thiothixene]      SI    Other      "novaine?" antipsychotic       Social History:     Marital Status: Single   Patient Pre-hospital Living Situation:  Unknown  Patient Pre-hospital Level of Mobility:  Wheelchair  Patient Pre-hospital Diet Restrictions: none  Substance Use History:   Social History     Substance and Sexual Activity   Alcohol Use No    Frequency: Never     Social History     Tobacco Use   Smoking Status Former Smoker    Quit date: 2011    Years since quitting: 10 3   Smokeless Tobacco Never Used   Tobacco Comment    marijuana     Social History     Substance and Sexual Activity   Drug Use Yes    Types: Marijuana    Comment: marijuana daily       Family History:    Family History   Problem Relation Age of Onset    No Known Problems Mother     No Known Problems Father     Colon cancer Neg Hx     Drug abuse Neg Hx         mother father    Mental illness Neg Hx         disorder, mother father    Cancer Neg Hx     Breast cancer Neg Hx        Physical Exam:     Vitals:   Blood Pressure: 146/84 (04/26/21 2030)  Pulse: 74 (04/26/21 2030)  Temperature: 98 3 °F (36 8 °C) (04/26/21 2030)  Temp Source: Oral (04/26/21 2030)  Respirations: 18 (04/26/21 2030)  Height: 5' 2" (157 5 cm) (04/26/21 2030)  Weight - Scale: 79 4 kg (175 lb) (04/26/21 2030)  SpO2: 98 % (04/26/21 2030)    Physical Exam  Vitals signs and nursing note reviewed  Constitutional:       General: She is not in acute distress  Appearance: Normal appearance  She is well-developed  She is not ill-appearing, toxic-appearing or diaphoretic  HENT:      Head: Normocephalic and atraumatic  Eyes:      Conjunctiva/sclera: Conjunctivae normal       Pupils: Pupils are equal, round, and reactive to light  Neck:      Musculoskeletal: Neck supple  Cardiovascular:      Rate and Rhythm: Normal rate and regular rhythm  Heart sounds: No murmur  No friction rub  No gallop  Pulmonary:      Effort: Pulmonary effort is normal  No respiratory distress  Breath sounds: Normal breath sounds  No stridor  No wheezing, rhonchi or rales  Chest:      Chest wall: No tenderness  Abdominal:      General: Abdomen is flat  Bowel sounds are normal  There is no distension  Palpations: Abdomen is soft  Tenderness: There is abdominal tenderness  There is left CVA tenderness  There is no right CVA tenderness or rebound  Musculoskeletal:      Right lower leg: No edema  Left lower leg: No edema  Skin:     General: Skin is warm and dry  Capillary Refill: Capillary refill takes 2 to 3 seconds  Coloration: Skin is not jaundiced or pale  Neurological:      General: No focal deficit present  Mental Status: She is alert and oriented to person, place, and time  Mental status is at baseline  Additional Data:     Lab Results: I have personally reviewed pertinent reports     and I have personally reviewed pertinent films in PACS    Results from last 7 days   Lab Units 04/26/21  1727   WBC Thousand/uL 5 71   HEMOGLOBIN g/dL 12 7   HEMATOCRIT % 38 6   PLATELETS Thousands/uL 258   NEUTROS PCT % 83*   LYMPHS PCT % 10*   MONOS PCT % 7   EOS PCT % 0     Results from last 7 days   Lab Units 04/26/21  1727   SODIUM mmol/L 143   POTASSIUM mmol/L 4 2   CHLORIDE mmol/L 108   CO2 mmol/L 23   BUN mg/dL 24   CREATININE mg/dL 2 53*   ANION GAP mmol/L 12   CALCIUM mg/dL 9 2   ALBUMIN g/dL 3 8   TOTAL BILIRUBIN mg/dL 0 23   ALK PHOS U/L 133*   ALT U/L 27   AST U/L 23   GLUCOSE RANDOM mg/dL 113     Results from last 7 days   Lab Units 04/26/21  1727   INR  1 07             Results from last 7 days   Lab Units 04/26/21  1727   LACTIC ACID mmol/L 1 0       Imaging: I have personally reviewed pertinent reports  and I have personally reviewed pertinent films in PACS    No orders to display       Allscripts / Epic Records Reviewed: Yes     ** Please Note: This note has been constructed using a voice recognition system   **

## 2021-04-27 NOTE — DISCHARGE SUMMARY
Beverly 48  Discharge- Jacqueline Ruiz 1970, 48 y o  female MRN: [id number]  Unit/Bed#: E4 -01 Encounter: [id number]  Primary Care Provider: Jaquelin Vigil PA-C   Date and time admitted to hospital: 4/26/2021  4:49 PM    * Pyelonephritis-resolved as of 4/27/2021  Assessment & Plan  · Patient presented to the emergency department for the 3rd time since 04/23 with left-sided flank pain, and progressive nausea/vomiting  · Urine culture on 03/23 showed alpha hemolytic Streptococcus  · Urinalysis on arrival to emergency department with negative leukocytes and nitrites today- showing improvement from UA's was done on 04/23 and 4/25 min however secondary to patient's intractable nausea, vomiting, and significant left-sided back pain patient will be admitted for outpatient failure of pyelonephritis treatment  · Received 1 g IV Rocephin in emergency department  · Patient was continued on IV Rocephin while inpatient  · Patient remained without leukocytosis while inpatient, afebrile  · Blood cultures pending, follow up with results  Do not suspect that these will be positive secondary to the patient denying any systemic symptoms  · Patient reporting improved pain and nausea  · Will discharge patient on 100 mg Macrobid b i d   X5 days  · Follow-up with PCP in 1 week    Acute renal failure superimposed on stage 4 chronic kidney disease (HCC)-resolved as of 4/27/2021  Assessment & Plan  Lab Results   Component Value Date    EGFR 29 04/27/2021    EGFR 21 04/26/2021    EGFR 21 04/25/2021    CREATININE 1 98 (H) 04/27/2021    CREATININE 2 53 (H) 04/26/2021    CREATININE 2 59 (H) 04/25/2021   · Appears that the patient's baseline creatinine since July 2020 has run from 1 5-2 1  · Suspect MARY is currently secondary to dehydration this patient reports decreased oral intake and vomiting in addition to active pyelonephritis  · Patient received IV normal saline  · Creatinine improved to 1 98 this a m , follow-up with PCP    Alpha-hemolytic Streptococcus positive urine culture  Assessment & Plan  · Patient with alpha hemolytic strep positive urine culture on 4/23  · Likely contaminant, however will treat outpatient with Macrobid 100 mg b i d  X5 days  · Report back to the emergency department with uncontrolled pain, nausea, vomiting    Vaginal atrophy  Assessment & Plan  · Patient followed closely by OBGYN  · Continue Premarin 1 g  · Follow up with obgyn outpatient    Attention deficit hyperactivity disorder (ADHD)  Assessment & Plan  · Patient maintained on Adderall 20 mg b i d  By PCP  · PDMP reviewed    IBS (irritable bowel syndrome)  Assessment & Plan  · With a history of mixed IBS  · Patient reported approximately 4 days of diarrhea, states that it began to occur when she started antibiotics  · Continue probiotics at home  · Patient with GI appointment scheduled for 5/10    Chronic narcotic use  Assessment & Plan  · Patient with chronic pain  · Maintained on Percocet  by PCP  · PDMP reviewed  · Follow-up with PCP    Gastroesophageal reflux disease  Assessment & Plan  · Continue Protonix 40 mg b i d  Bipolar 1 disorder (HCC)  Assessment & Plan  · Continue Wellbutrin 300 mg q a m    · Continue Seroquel 150 mg HS    Benign hypertension with CKD (chronic kidney disease) stage IV [facility])  Assessment & Plan  Lab Results   Component Value Date    EGFR 29 04/27/2021    EGFR 21 04/26/2021    EGFR 21 04/25/2021    CREATININE 1 98 (H) 04/27/2021    CREATININE 2 53 (H) 04/26/2021    CREATININE 2 59 (H) 04/25/2021   · Patient's initial allergy list included amlodipine stating that it caused hives  · Patient states that despite this, she has been taking amlodipine for over a year  · Amlodipine was initially hold when patient was admitted, blood pressure most recently 152/102-suspect likely secondary to not taking antihypertensive  · Will update patient's allergy list and administer amlodipine 5 mg prior to discharge    Dyslipidemia  Assessment & Plan  · Atorvastatin 20 mg q d  · Follow-up PCP    Type 2 diabetes mellitus with renal complication [facility])  Assessment & Plan  Lab Results   Component Value Date    HGBA1C 6 0 02/15/2021       Recent Labs     04/26/21 2140   POCGLU 81       Blood Sugar Average: Last 72 hrs:  · (P) 81   · Patient reports using 20 units basal insulin HS, continue  · Uses 6 units NovoLog t i d  With meals, continue  · Follow up with PCP      Discharging Physician / Practitioner: Julieta Whitfield PA-C  PCP: Nataly Limon PA-C  Admission Date:   Admission Orders (From admission, onward)     Ordered        04/26/21 2013  Inpatient Admission  Once                   Discharge Date: 04/27/21    Resolved Problems  Date Reviewed: 4/26/2021          Resolved    Acute renal failure superimposed on stage 4 chronic kidney disease ([facility] [address] ) 4/27/2021     Resolved by  Julieta Whitfield PA-C    * (Principal) Pyelonephritis 4/27/2021     Resolved by  Julieta Whitfield PA-C          Consultations During Hospital Stay:  · None    Procedures Performed:   · None    Significant Findings / Test Results:   · None    Incidental Findings:   · None     Test Results Pending at Discharge (will require follow up): · Blood cultures     Outpatient Tests Requested:  · None    Complications:  None    Reason for Admission:  Pyelonephritis with failed outpatient treatment    Hospital Course:     Benitez Cook is a 48 y o  female patient with a past medical history of hypertension, hyperlipidemia, CKD stage 4, anxiety, bipolar 1 disorder, and wegeners granulomatosis who originally presented to the hospital on 4/26/2021 due to left-sided flank pain  Yesterday's visit to the emergency department was her 3rd visit since 04/23 for the same complaint  She was initially treated with Cipro outpatient, and upon an increase in pain, she presented to the emergency department for a 2nd time on 04/25    While at the ED, they prescribed her another antibiotic  On 04/26, the patient states that she was unable to keep any food down, complained of significant nausea and vomiting  Also reported worsening left flank pain  This caused her to present to the ED for a 3rd time  She was admitted to the hospital for failed outpatient treatment of pyelonephritis and was subsequently treated with IV ceftriaxone  While admitted, the patient remained afebrile, without leukocytosis, and without any systemic signs of infection  She continued to receive IV ceftriaxone, Zofran for nausea, and scopolamine with much improvement in both her pain and her nausea  Of note, urine culture obtained on 04/23 grew alpha hemolytic Streptococcus  Blood cultures were obtained on 04/26 and her still pending  Will call patient with results  The patient, initially admitted to the hospital as inpatient, was discharged earlier than expected given the following: Rapid unexpected improvement in symptoms  Please see above list of diagnoses and related plan for additional information  Condition at Discharge: good     Discharge Day Visit / Exam:     Subjective:  Patient reports that her nausea and vomiting have since significantly improved  She was able to tolerate breakfast without any problem  States that her left flank pain is also significantly improved  Denies any fevers, chills, headaches, vision changes, chest pain, shortness of breath, constipation, diarrhea, leg swelling  States that she would like to go home today  Will coordinate ride home on her own  Vitals: Blood Pressure: (!) 152/102 (04/27/21 0759)  Pulse: 71 (04/27/21 0759)  Temperature: (!) 96 7 °F (35 9 °C) (04/27/21 0759)  Temp Source: Temporal (04/27/21 0759)  Respirations: 18 (04/27/21 0759)  Height: 5' 1" (154 9 cm) (04/26/21 2147)  Weight - Scale: 76 8 kg (169 lb 5 oz) (04/26/21 2147)  SpO2: 100 % (04/27/21 0759)  Exam:   Physical Exam  Vitals signs and nursing note reviewed     Constitutional: General: She is not in acute distress  Appearance: Normal appearance  She is well-developed  She is obese  She is not ill-appearing, toxic-appearing or diaphoretic  Comments: Appears comfortable, sitting up in bed  Very pleasant  HENT:      Head: Normocephalic and atraumatic  Eyes:      Conjunctiva/sclera: Conjunctivae normal       Pupils: Pupils are equal, round, and reactive to light  Neck:      Musculoskeletal: Neck supple  Cardiovascular:      Rate and Rhythm: Normal rate and regular rhythm  Pulses: Normal pulses  Heart sounds: No murmur  No friction rub  No gallop  Pulmonary:      Effort: Pulmonary effort is normal  No respiratory distress  Breath sounds: Normal breath sounds  No stridor  No wheezing, rhonchi or rales  Chest:      Chest wall: No tenderness  Abdominal:      General: Abdomen is flat  Bowel sounds are normal  There is no distension  Palpations: Abdomen is soft  Tenderness: There is no abdominal tenderness  There is no right CVA tenderness, left CVA tenderness or guarding  Musculoskeletal:      Right lower leg: No edema  Left lower leg: No edema  Skin:     General: Skin is warm and dry  Capillary Refill: Capillary refill takes less than 2 seconds  Coloration: Skin is not jaundiced or pale  Neurological:      General: No focal deficit present  Mental Status: She is alert and oriented to person, place, and time  Mental status is at baseline  Discussion with Family:  Discussed plan of care with patient, patient stated that she would update her family  Answered any questions  Discussed return precautions which include uncontrolled nausea, vomiting, pain, significant fevers/chills  Discharge instructions/Information to patient and family:   See after visit summary for information provided to patient and family        Provisions for Follow-Up Care:  See after visit summary for information related to follow-up care and any pertinent home health orders  Disposition:     Home    For Discharges to [facility] SNF:   · Not Applicable to this Patient - Not Applicable to this Patient    Planned Readmission:  n/a     Discharge Statement:  I spent 34 minutes discharging the patient  This time was spent on the day of discharge  I had direct contact with the patient on the day of discharge  Greater than 50% of the total time was spent examining patient, answering all patient questions, arranging and discussing plan of care with patient as well as directly providing post-discharge instructions  Additional time then spent on discharge activities  Discharge Medications:  See after visit summary for reconciled discharge medications provided to patient and family        ** Please Note: This note has been constructed using a voice recognition system **

## 2021-04-27 NOTE — UTILIZATION REVIEW
Initial Clinical Review    Admission: Date/Time/Statement:   Admission Orders (From admission, onward)     Ordered        04/26/21 2013  Inpatient Admission  Once                   Orders Placed This Encounter   Procedures    Inpatient Admission     Standing Status:   Standing     Number of Occurrences:   1     Order Specific Question:   Level of Care     Answer:   Med Surg [16]     Order Specific Question:   Estimated length of stay     Answer:   More than 2 Midnights     Order Specific Question:   Certification     Answer:   I certify that inpatient services are medically necessary for this patient for a duration of greater than two midnights  See H&P and MD Progress Notes for additional information about the patient's course of treatment  ED Arrival Information     Expected Arrival Acuity Means of Arrival Escorted By Service Admission Type    - 4/26/2021 16:49 Urgent Ambulance [facility] ([address]) Hospitalist Urgent    Arrival Complaint    Flank pain        Chief Complaint   Patient presents with    Flank Pain     Worsening left flank pain  Pt recently discharged from both [facility] and [facility]  Dx with pyelonephritis  Initially started on cipro but now on levaquin  N/V  Referred back to ED for IV abx by PCP        Initial Presentation: 49 yo female with Hx of IDDM, HTN, IBS, GERD, dyslipidemia, chronic narcotic use for chronic pain, Justa's granulomatosis presents to ED with  left-sided flank pain x4 days and intractable N/V that has not improved despite OP abx treatment  This is  patient's 3rd ED visit in the last 4 days  She was seen in ED  on 03/23 undergoing labs and a CTAP  ultimately being diagnosed with pyelonephritis & discharged with a course of ciprofloxacin  Despite taking the ciprofloxacin, the patient remained with flank pain and significant N/V which prompted  ED visit 3/25    At that time it was noted that she had MARY, as well as pyelonephritis; patient states that she felt better after administration of pain medication and IV antibiotics and  was discharged  Saw PCP today  who prescribed Levaquin, patient did take one dose however given persistent symptoms was sent to the ED for evaluation  ED Findings: Cr 2 53, Alk phos 133,  UA was improved from previous 2 visits, however given her intractable nausea and vomiting and being unable to keep her antibiotics down, she will be admitted to the medicine service for further management     Date:  4/26 Admitted to Inpatient [facility] with Pyelonephritis, ARFon CKD 4 (baseline cr 1 5 - 2 1)  Patient with alpha hemolytic strep positive urine culture on 4/23  Continue IV Rocephin,  Monitor wbc count & fever curve, f/u on BC's,  IVF's, BMP in am    Day 2: Patient reports that her nausea and vomiting have since significantly improved  She was able to tolerate breakfast without any problem  States that her left flank pain is also significantly improved  that she would like to go home today  Will discharge patient on 100 mg Macrobid b i d   X5 days  Follow-up with PCP in 1 week  The patient, initially admitted to the hospital as inpatient, was discharged earlier than expected given the following: Rapid unexpected improvement in symptoms        ED Triage Vitals   Temperature Pulse Respirations Blood Pressure SpO2   04/26/21 1655 04/26/21 1654 04/26/21 1654 04/26/21 1654 04/26/21 1654   98 3 °F (36 8 °C) 92 18 163/90 98 %      Temp Source Heart Rate Source Patient Position - Orthostatic VS BP Location FiO2 (%)   04/26/21 1655 04/26/21 1654 04/26/21 1654 04/26/21 1654 --   Oral Monitor Lying Right arm       Pain Score       04/26/21 1654       Worst Possible Pain          Wt Readings from Last 1 Encounters:   04/26/21 76 8 kg (169 lb 5 oz)     Additional Vital Signs:   04/27/21 0759  96 7 °F (35 9 °C)  71  18  152/102Abnormal   --  100 %  None (Room air)  Sitting   04/26/21 2353  97 6 °F (36 4 °C)  68  19  152/94  103  98 %  None (Room air)  Lying   04/26/21 2147 96 7 °F (35 9 °C)  72  18  135/87  --  99 %  None (Room air)  Lying   04/26/21 2030  98 3 °F (36 8 °C)  74  18  146/84  108  98 %  None (Room air)  Lying   04/26/21 2000  --  88  18  134/90  --  97 %  None (Room air)  Lying   04/26/21 1822  --  84  18  178/108Abnormal   --  98 %  None (Room air)         Pertinent Labs/Diagnostic Test Results:     Results from last 7 days   Lab Units 04/27/21  0612 04/26/21  1727 04/25/21  1401 04/23/21  0845   WBC Thousand/uL 4 26* 5 71 7 18 6 15   HEMOGLOBIN g/dL 11 2* 12 7 12 7 13 8   HEMATOCRIT % 34 3* 38 6 37 7 42 3   PLATELETS Thousands/uL 206 258 264 261   NEUTROS ABS Thousands/µL 2 36 4 70 4 91 4 87     Results from last 7 days   Lab Units 04/27/21  0612 04/26/21  1727 04/25/21  1401 04/23/21  0845   SODIUM mmol/L 144 143 140 142   POTASSIUM mmol/L 3 6 4 2 5 5* 4 5   CHLORIDE mmol/L 111* 108 113* 107   CO2 mmol/L 24 23 22 24   ANION GAP mmol/L 9 12 5 11   BUN mg/dL 19 24 32* 31*   CREATININE mg/dL 1 98* 2 53* 2 59* 2 17*   EGFR ml/min/1 73sq m 29 21 21 26   CALCIUM mg/dL 8 8 9 2 8 8 9 6     Results from last 7 days   Lab Units 04/26/21  1727 04/25/21  1401 04/23/21  0845   AST U/L 23 48* 19   ALT U/L 27 26 23   ALK PHOS U/L 133* 127* 152*   TOTAL PROTEIN g/dL 8 0 8 0 8 5*   ALBUMIN g/dL 3 8 3 7 4 0   TOTAL BILIRUBIN mg/dL 0 23 0 39 0 35     Results from last 7 days   Lab Units 04/26/21  2140   POC GLUCOSE mg/dl 81     Results from last 7 days   Lab Units 04/27/21  0612 04/26/21  1727 04/25/21  1401 04/23/21  0845   GLUCOSE RANDOM mg/dL 76 113 69 115     Results from last 7 days   Lab Units 04/26/21  1727   TROPONIN I ng/mL <0 02     Results from last 7 days   Lab Units 04/26/21  1727   PROTIME seconds 13 7   INR  1 07   PTT seconds 40*     Results from last 7 days   Lab Units 04/26/21  1727   PROCALCITONIN ng/ml <0 05     Results from last 7 days   Lab Units 04/26/21  1727   LACTIC ACID mmol/L 1 0     Results from last 7 days   Lab Units 04/26/21  1727 04/25/21  1401 04/23/21  0845   LIPASE u/L 189 162 225     Results from last 7 days   Lab Units 04/26/21  1859 04/25/21  1428 04/23/21  0957   CLARITY UA  Clear Cloudy Clear   COLOR UA  Yellow Yellow Yellow   SPEC GRAV UA  1 025 1 021 1 025   PH UA  6 5 5 5 6 5   GLUCOSE UA mg/dl Negative Negative Negative   KETONES UA mg/dl Negative Negative Negative   BLOOD UA  Trace* Trace* Small*   PROTEIN UA mg/dl 100 (2+)* 100 (2+)* 100 (2+)*   NITRITE UA  Negative Negative Negative   BILIRUBIN UA  Negative Negative Negative   UROBILINOGEN UA E U /dl 0 2 1 0 0 2   LEUKOCYTES UA  Negative Moderate* Small*   WBC UA /hpf 1-2* Innumerable* 30-50*   RBC UA /hpf 0-1* 2-4 1-2*   BACTERIA UA /hpf Occasional None Seen Occasional   EPITHELIAL CELLS WET PREP /hpf Occasional Moderate* Moderate*     Results from last 7 days   Lab Units 04/26/21  1730 04/26/21  1727 04/25/21  1428 04/23/21  0957   BLOOD CULTURE  Received in Microbiology Lab  Culture in Progress  Received in Microbiology Lab  Culture in Progress    --   --    URINE CULTURE   --   --  No Growth <1000 cfu/mL 30,000-39,000 cfu/ml Alpha Hemolytic Streptococcus NOT Enterococcus*     ED Treatment:   Medication Administration from 04/26/2021 1649 to 04/26/2021 2119       Date/Time Order Dose Route Action     04/26/2021 1728 sodium chloride 0 9 % bolus 1,000 mL 1,000 mL Intravenous New Bag     04/26/2021 1730 ceftriaxone (ROCEPHIN) 1 g/50 mL in dextrose IVPB 1,000 mg Intravenous New Bag     04/26/2021 1729 fentanyl citrate (PF) 100 MCG/2ML 50 mcg 50 mcg Intravenous Given     04/26/2021 1914 ondansetron (ZOFRAN) injection 4 mg 4 mg Intravenous Given     04/26/2021 1955 fentanyl citrate (PF) 100 MCG/2ML 50 mcg 50 mcg Intravenous Given        Past Medical History:   Diagnosis Date    ADHD     Anemia of chronic disease     Anxiety     Asthma     Asthma     Borderline personality disorder (HCC)     Cataplexy     Chronic abdominal pain     CKD (chronic kidney disease) stage 3, GFR 30-59 ml/min ([place] [address] )     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[place] [address] )     Cushing syndrome ([place] [address] )     Diabetes mellitus ([place] [address] )     DVT (deep venous thrombosis) (HCC)     GERD (gastroesophageal reflux disease)     History of acute pancreatitis     felt secondary to Bactrim    History of transfusion     HTN (hypertension)     Hypertension     Liver disease     fatty liver    Microscopic polyangiitis (HCC)     Morbid obesity (HCC)     MPA (microscopic polyangiitis) (HCC)     Ovarian cyst     PTSD (post-traumatic stress disorder)     Renal disorder     Self-inflicted injury     self inflicted skin wounds    Wegener's granulomatosis with renal involvement ([place] [address] ) 2015     Present on Admission:   Vaginal atrophy   Type 2 diabetes mellitus with renal complication (HCC)   Gastroesophageal reflux disease   Acute renal failure superimposed on stage 4 chronic kidney disease (HCC)   Chronic narcotic use   Benign hypertension with CKD (chronic kidney disease) stage IV (HCC)   Bipolar 1 disorder (HCC)   Dyslipidemia   Attention deficit hyperactivity disorder (ADHD)   IBS (irritable bowel syndrome)      Admitting Diagnosis: Pyelonephritis [N12]  Flank pain [R10 9]  MARY (acute kidney injury) ([place] [address] ) [N17 9]  Age/Sex: 48 y o  female  Admission Orders:  Scheduled Medications:  amphetamine-dextroamphetamine, 20 mg, Oral, BID before breakfast/lunch  atorvastatin, 20 mg, Oral, Daily  buPROPion, 300 mg, Oral, QAM  cefTRIAXone, 1,000 mg, Intravenous, Q24H  [START ON 4/29/2021] conjugated estrogens, 1 g, Vaginal, Once per day on Mon Thu  insulin glargine, 22 Units, Subcutaneous, HS  lubiprostone, 8 mcg, Oral, BID With Meals  montelukast, 10 mg, Oral, HS  pantoprazole, 40 mg, Oral, BID AC  QUEtiapine, 150 mg, Oral, HS  saccharomyces boulardii, 250 mg, Oral, BID  scopolamine, 1 patch, Transdermal, Q72H    Continuous IV Infusions:  sodium chloride, 100 mL/hr, Intravenous, Continuous    PRN Meds:  calcium carbonate, 1,000 mg, Oral, Daily PRN  hydrALAZINE, 5 mg, Intravenous, Q6H PRN  ondansetron, 4 mg, Intravenous, Q4H PRN  oxyCODONE-acetaminophen, 1 tablet, Oral, Q6H PRN x 1 4/26  & x 1 4/27  polyethylene glycol, 17 g, Oral, Daily PRN    SCDs      Network Utilization Review Department  ATTENTION: Please call with any questions or concerns to [phone] and carefully listen to the prompts so that you are directed to the right person  All voicemails are confidential   Thad Molina all requests for admission clinical reviews, approved or denied determinations and any other requests to dedicated fax number below belonging to the campus where the patient is receiving treatment   List of dedicated fax numbers for the Facilities:  [address] DENIALS (Administrative/Medical Necessity) [phone]   [address] (Maternity/NICU/Pediatrics) [phone]   [address] [place] [address] [address] [place] [address] [address] [place] [address] [phone]

## 2021-04-27 NOTE — ASSESSMENT & PLAN NOTE
· With a history of mixed IBS  · Patient reported approximately 4 days of diarrhea, states that it began to occur when she started antibiotics  · Continue probiotics at home  · Patient with GI appointment scheduled for 5/10

## 2021-04-27 NOTE — UTILIZATION REVIEW
Inpatient Admission Authorization Request   NOTIFICATION OF INPATIENT ADMISSION/INPATIENT AUTHORIZATION REQUEST   SERVICING FACILITY:   [address] [place] [address], [address]   Tax ID: [id number]  NPI: [id number]  Place of Service: [facility]patient [facility]  [address]  Place of Service Code: 24     ATTENDING PROVIDER:  Attending Name and NPI#: Nova Schwartz [[id number]]  Address: [address] [place] [address], [address]   Phone: [phone]     UTILIZATION REVIEW CONTACT:  [employer], Utilization   Network Utilization Review Department  Phone: [phone]  Fax: [phone]  Email: [email or web address]     PHYSICIAN ADVISORY SERVICES:  FOR [facility] REVIEW - MEDICAL NECESSITY DENIAL  Phone: [phone]  Fax: [phone]  Email: [email or web address]     TYPE OF REQUEST:  Inpatient Status     ADMISSION INFORMATION:  ADMISSION DATE/TIME: 4/26/21 2013  PATIENT DIAGNOSIS CODE/DESCRIPTION:  Pyelonephritis [N12]  Flank pain [R10 9]  MARY (acute kidney injury) ([facility] [address] ) [N17 9]  DISCHARGE DATE/TIME: 4/27/2021 12:25 PM  DISCHARGE DISPOSITION (IF DISCHARGED): Home/Self Care     IMPORTANT INFORMATION:  Please contact the [employer] directly with any questions or concerns regarding this request  Department voicemails are confidential     Send requests for admission clinical reviews, concurrent reviews, approvals, and administrative denials due to lack of clinical to fax [phone]

## 2021-04-27 NOTE — ASSESSMENT & PLAN NOTE
· Patient with chronic pain  · Maintained on Percocet  by PCP  · PDMP reviewed  · Will continue with pain management while inpatient

## 2021-04-27 NOTE — ASSESSMENT & PLAN NOTE
· Patient presented to the emergency department for the 3rd time since 04/23 with left-sided flank pain, and progressive nausea/vomiting  · Urine culture on 03/23 showed alpha hemolytic Streptococcus  · Urinalysis on arrival to emergency department with negative leukocytes and nitrites today- showing improvement from UA's was done on 04/23 and 4/25 min however secondary to patient's intractable nausea, vomiting, and significant left-sided back pain patient will be admitted for outpatient failure of pyelonephritis treatment  · Received 1 g IV Rocephin in emergency department  · Patient was continued on IV Rocephin while inpatient  · Patient remained without leukocytosis while inpatient, afebrile  · Blood cultures pending, follow up with results  Do not suspect that these will be positive secondary to the patient denying any systemic symptoms  · Patient reporting improved pain and nausea  · Will discharge patient on 100 mg Macrobid b i d   X5 days  · Follow-up with PCP in 1 week

## 2021-04-27 NOTE — ASSESSMENT & PLAN NOTE
· Patient with alpha hemolytic strep positive urine culture on 4/23  · Being treated with IV Rocephin

## 2021-04-27 NOTE — ASSESSMENT & PLAN NOTE
Lab Results   Component Value Date    HGBA1C 6 0 02/15/2021       Recent Labs     04/26/21  2140   POCGLU 81       Blood Sugar Average: Last 72 hrs:  · (P) 81   · Patient reports using 20 units basal insulin HS, continue  · Uses 6 units NovoLog t i d   With meals, continue  · Follow up with PCP

## 2021-04-28 ENCOUNTER — TRANSITIONAL CARE MANAGEMENT (OUTPATIENT)
Dept: FAMILY MEDICINE CLINIC | Facility: CLINIC | Age: 51
End: 2021-04-28

## 2021-04-29 ENCOUNTER — TELEMEDICINE (OUTPATIENT)
Dept: FAMILY MEDICINE CLINIC | Facility: CLINIC | Age: 51
End: 2021-04-29

## 2021-04-29 ENCOUNTER — APPOINTMENT (EMERGENCY)
Dept: ULTRASOUND IMAGING | Facility: HOSPITAL | Age: 51
End: 2021-04-29
Payer: COMMERCIAL

## 2021-04-29 ENCOUNTER — HOSPITAL ENCOUNTER (OUTPATIENT)
Facility: HOSPITAL | Age: 51
Setting detail: OBSERVATION
Discharge: HOME HEALTH | End: 2021-05-01
Attending: EMERGENCY MEDICINE | Admitting: INTERNAL MEDICINE
Payer: COMMERCIAL

## 2021-04-29 ENCOUNTER — TELEPHONE (OUTPATIENT)
Dept: FAMILY MEDICINE CLINIC | Facility: CLINIC | Age: 51
End: 2021-04-29

## 2021-04-29 DIAGNOSIS — A59.9 TRICHOMONAS VAGINALIS INFECTION: ICD-10-CM

## 2021-04-29 DIAGNOSIS — N17.9 ACUTE RENAL FAILURE SUPERIMPOSED ON STAGE 3 CHRONIC KIDNEY DISEASE (HCC): ICD-10-CM

## 2021-04-29 DIAGNOSIS — N12 PYELONEPHRITIS: Primary | ICD-10-CM

## 2021-04-29 DIAGNOSIS — R10.9 INTRACTABLE ABDOMINAL PAIN: ICD-10-CM

## 2021-04-29 DIAGNOSIS — N18.9 ACUTE-ON-CHRONIC KIDNEY INJURY (HCC): Primary | ICD-10-CM

## 2021-04-29 DIAGNOSIS — N17.9 ACUTE-ON-CHRONIC KIDNEY INJURY (HCC): Primary | ICD-10-CM

## 2021-04-29 DIAGNOSIS — R19.7 DIARRHEA: ICD-10-CM

## 2021-04-29 DIAGNOSIS — R11.10 VOMITING: ICD-10-CM

## 2021-04-29 DIAGNOSIS — R19.7 DIARRHEA, UNSPECIFIED TYPE: ICD-10-CM

## 2021-04-29 DIAGNOSIS — N18.30 ACUTE RENAL FAILURE SUPERIMPOSED ON STAGE 3 CHRONIC KIDNEY DISEASE (HCC): ICD-10-CM

## 2021-04-29 PROBLEM — R11.2 NAUSEA & VOMITING: Status: ACTIVE | Noted: 2021-04-29

## 2021-04-29 LAB
ALBUMIN SERPL BCP-MCNC: 4 G/DL (ref 3.5–5)
ALP SERPL-CCNC: 129 U/L (ref 46–116)
ALT SERPL W P-5'-P-CCNC: 26 U/L (ref 12–78)
ANION GAP SERPL CALCULATED.3IONS-SCNC: 12 MMOL/L (ref 4–13)
AST SERPL W P-5'-P-CCNC: 21 U/L (ref 5–45)
ATRIAL RATE: 61 BPM
BACTERIA UR QL AUTO: NORMAL /HPF
BASOPHILS # BLD AUTO: 0.02 THOUSANDS/ΜL (ref 0–0.1)
BASOPHILS NFR BLD AUTO: 0 % (ref 0–1)
BILIRUB SERPL-MCNC: 0.22 MG/DL (ref 0.2–1)
BILIRUB UR QL STRIP: NEGATIVE
BUN SERPL-MCNC: 37 MG/DL (ref 5–25)
CALCIUM SERPL-MCNC: 8.9 MG/DL (ref 8.3–10.1)
CHLORIDE SERPL-SCNC: 105 MMOL/L (ref 100–108)
CLARITY UR: ABNORMAL
CO2 SERPL-SCNC: 22 MMOL/L (ref 21–32)
COLOR UR: YELLOW
CREAT SERPL-MCNC: 2.86 MG/DL (ref 0.6–1.3)
EOSINOPHIL # BLD AUTO: 0.04 THOUSAND/ΜL (ref 0–0.61)
EOSINOPHIL NFR BLD AUTO: 1 % (ref 0–6)
ERYTHROCYTE [DISTWIDTH] IN BLOOD BY AUTOMATED COUNT: 13.2 % (ref 11.6–15.1)
GFR SERPL CREATININE-BSD FRML MDRD: 18 ML/MIN/1.73SQ M
GLUCOSE SERPL-MCNC: 151 MG/DL (ref 65–140)
GLUCOSE UR STRIP-MCNC: NEGATIVE MG/DL
HCT VFR BLD AUTO: 39 % (ref 34.8–46.1)
HGB BLD-MCNC: 12.7 G/DL (ref 11.5–15.4)
HGB UR QL STRIP.AUTO: NEGATIVE
IMM GRANULOCYTES # BLD AUTO: 0.03 THOUSAND/UL (ref 0–0.2)
IMM GRANULOCYTES NFR BLD AUTO: 1 % (ref 0–2)
KETONES UR STRIP-MCNC: NEGATIVE MG/DL
LEUKOCYTE ESTERASE UR QL STRIP: ABNORMAL
LIPASE SERPL-CCNC: 192 U/L (ref 73–393)
LYMPHOCYTES # BLD AUTO: 1.87 THOUSANDS/ΜL (ref 0.6–4.47)
LYMPHOCYTES NFR BLD AUTO: 30 % (ref 14–44)
MCH RBC QN AUTO: 30.7 PG (ref 26.8–34.3)
MCHC RBC AUTO-ENTMCNC: 32.6 G/DL (ref 31.4–37.4)
MCV RBC AUTO: 94 FL (ref 82–98)
MONOCYTES # BLD AUTO: 0.65 THOUSAND/ΜL (ref 0.17–1.22)
MONOCYTES NFR BLD AUTO: 10 % (ref 4–12)
NEUTROPHILS # BLD AUTO: 3.71 THOUSANDS/ΜL (ref 1.85–7.62)
NEUTS SEG NFR BLD AUTO: 58 % (ref 43–75)
NITRITE UR QL STRIP: NEGATIVE
NON-SQ EPI CELLS URNS QL MICRO: NORMAL /HPF
NRBC BLD AUTO-RTO: 0 /100 WBCS
P AXIS: 49 DEGREES
PH UR STRIP.AUTO: 5.5 [PH]
PLATELET # BLD AUTO: 279 THOUSANDS/UL (ref 149–390)
PMV BLD AUTO: 10.1 FL (ref 8.9–12.7)
POTASSIUM SERPL-SCNC: 3.9 MMOL/L (ref 3.5–5.3)
PR INTERVAL: 144 MS
PROT SERPL-MCNC: 7.8 G/DL (ref 6.4–8.2)
PROT UR STRIP-MCNC: ABNORMAL MG/DL
QRS AXIS: 3 DEGREES
QRSD INTERVAL: 68 MS
QT INTERVAL: 400 MS
QTC INTERVAL: 402 MS
RBC # BLD AUTO: 4.14 MILLION/UL (ref 3.81–5.12)
RBC #/AREA URNS AUTO: NORMAL /HPF
SARS-COV-2 RNA RESP QL NAA+PROBE: NEGATIVE
SODIUM SERPL-SCNC: 139 MMOL/L (ref 136–145)
SP GR UR STRIP.AUTO: >=1.03 (ref 1–1.03)
T WAVE AXIS: 47 DEGREES
TROPONIN I SERPL-MCNC: <0.02 NG/ML
UROBILINOGEN UR QL STRIP.AUTO: 0.2 E.U./DL
VENTRICULAR RATE: 61 BPM
WBC # BLD AUTO: 6.32 THOUSAND/UL (ref 4.31–10.16)
WBC #/AREA URNS AUTO: NORMAL /HPF

## 2021-04-29 PROCEDURE — 99285 EMERGENCY DEPT VISIT HI MDM: CPT | Performed by: PHYSICIAN ASSISTANT

## 2021-04-29 PROCEDURE — 84484 ASSAY OF TROPONIN QUANT: CPT | Performed by: PHYSICIAN ASSISTANT

## 2021-04-29 PROCEDURE — 99220 PR INITIAL OBSERVATION CARE/DAY 70 MINUTES: CPT | Performed by: INTERNAL MEDICINE

## 2021-04-29 PROCEDURE — 83690 ASSAY OF LIPASE: CPT | Performed by: PHYSICIAN ASSISTANT

## 2021-04-29 PROCEDURE — 93010 ELECTROCARDIOGRAM REPORT: CPT | Performed by: INTERNAL MEDICINE

## 2021-04-29 PROCEDURE — 81001 URINALYSIS AUTO W/SCOPE: CPT | Performed by: PHYSICIAN ASSISTANT

## 2021-04-29 PROCEDURE — U0003 INFECTIOUS AGENT DETECTION BY NUCLEIC ACID (DNA OR RNA); SEVERE ACUTE RESPIRATORY SYNDROME CORONAVIRUS 2 (SARS-COV-2) (CORONAVIRUS DISEASE [COVID-19]), AMPLIFIED PROBE TECHNIQUE, MAKING USE OF HIGH THROUGHPUT TECHNOLOGIES AS DESCRIBED BY CMS-2020-01-R: HCPCS | Performed by: PHYSICIAN ASSISTANT

## 2021-04-29 PROCEDURE — 85025 COMPLETE CBC W/AUTO DIFF WBC: CPT | Performed by: PHYSICIAN ASSISTANT

## 2021-04-29 PROCEDURE — 36415 COLL VENOUS BLD VENIPUNCTURE: CPT | Performed by: PHYSICIAN ASSISTANT

## 2021-04-29 PROCEDURE — 80053 COMPREHEN METABOLIC PANEL: CPT | Performed by: PHYSICIAN ASSISTANT

## 2021-04-29 PROCEDURE — G2025 DIS SITE TELE SVCS RHC/FQHC: HCPCS | Performed by: PHYSICIAN ASSISTANT

## 2021-04-29 PROCEDURE — 96361 HYDRATE IV INFUSION ADD-ON: CPT

## 2021-04-29 PROCEDURE — 96376 TX/PRO/DX INJ SAME DRUG ADON: CPT

## 2021-04-29 PROCEDURE — U0005 INFEC AGEN DETEC AMPLI PROBE: HCPCS | Performed by: PHYSICIAN ASSISTANT

## 2021-04-29 PROCEDURE — 96374 THER/PROPH/DIAG INJ IV PUSH: CPT

## 2021-04-29 PROCEDURE — 99285 EMERGENCY DEPT VISIT HI MDM: CPT

## 2021-04-29 PROCEDURE — 96375 TX/PRO/DX INJ NEW DRUG ADDON: CPT

## 2021-04-29 PROCEDURE — 76705 ECHO EXAM OF ABDOMEN: CPT

## 2021-04-29 PROCEDURE — 93005 ELECTROCARDIOGRAM TRACING: CPT

## 2021-04-29 RX ORDER — SODIUM CHLORIDE, SODIUM LACTATE, POTASSIUM CHLORIDE, CALCIUM CHLORIDE 600; 310; 30; 20 MG/100ML; MG/100ML; MG/100ML; MG/100ML
100 INJECTION, SOLUTION INTRAVENOUS CONTINUOUS
Status: DISCONTINUED | OUTPATIENT
Start: 2021-04-29 | End: 2021-04-30

## 2021-04-29 RX ORDER — HYDROMORPHONE HCL/PF 1 MG/ML
0.5 SYRINGE (ML) INJECTION ONCE
Status: COMPLETED | OUTPATIENT
Start: 2021-04-29 | End: 2021-04-29

## 2021-04-29 RX ORDER — ONDANSETRON 2 MG/ML
4 INJECTION INTRAMUSCULAR; INTRAVENOUS ONCE
Status: COMPLETED | OUTPATIENT
Start: 2021-04-29 | End: 2021-04-29

## 2021-04-29 RX ADMIN — ONDANSETRON 4 MG: 2 INJECTION INTRAMUSCULAR; INTRAVENOUS at 19:54

## 2021-04-29 RX ADMIN — HYDROMORPHONE HYDROCHLORIDE 0.5 MG: 1 INJECTION, SOLUTION INTRAMUSCULAR; INTRAVENOUS; SUBCUTANEOUS at 20:35

## 2021-04-29 RX ADMIN — HYDROMORPHONE HYDROCHLORIDE 0.5 MG: 1 INJECTION, SOLUTION INTRAMUSCULAR; INTRAVENOUS; SUBCUTANEOUS at 23:42

## 2021-04-29 RX ADMIN — SODIUM CHLORIDE, SODIUM LACTATE, POTASSIUM CHLORIDE, AND CALCIUM CHLORIDE 100 ML/HR: .6; .31; .03; .02 INJECTION, SOLUTION INTRAVENOUS at 23:11

## 2021-04-29 RX ADMIN — HYDROMORPHONE HYDROCHLORIDE 0.5 MG: 1 INJECTION, SOLUTION INTRAMUSCULAR; INTRAVENOUS; SUBCUTANEOUS at 19:53

## 2021-04-29 NOTE — PROGRESS NOTES
Virtual Brief Visit    Assessment/Plan:    Problem List Items Addressed This Visit     None      Visit Diagnoses     Pyelonephritis    -  Primary    Relevant Orders    Transfer to other facility (Completed)    Diarrhea, unspecified type        Relevant Orders    Transfer to other facility (Completed)        Pyelonephritis/ diarrhea  - Patient has not been able to keep anything down for the past few days including fluids and has been experiencing persistent diarrhea  - At this time, advised patient it would be most beneficial for her to be further evaluated at the emergency department to assess for dehydration and for presence of worsening infection   - Patient agreed and is planning on presenting to [facility] today  Reason for visit is   Chief Complaint   Patient presents with    Virtual Brief Visit        Encounter provider Lucrecia Cobb PA-C    Provider located at [address] [address] [phone]    Recent Visits  Date Type Provider Dept   04/26/21 Office Visit MD Ladonna Mccauley [address] recent visits within past 7 days and meeting all other requirements     Today's Visits  Date Type Provider Dept   04/29/21 Telephone Elvia Chase [facility]   04/29/21 Telemedicine HOLLIS Hazel [facility]   Showing today's visits and meeting all other requirements     Future Appointments  No visits were found meeting these conditions  Showing future appointments within next 150 days and meeting all other requirements        After connecting through [employer] and patient was informed that this is a secure, HIPAA-compliant platform  She agrees to proceed  , the patient was identified by name and date of birth   Laurie Storey was informed that this is a telemedicine visit and that the visit is being conducted through [facility] and patient was informed that this is a secure, HIPAA-compliant platform  She agrees to proceed     My office door was closed  No one else was in the room  She acknowledged consent and understanding of privacy and security of the platform  The patient has agreed to participate and understands she can discontinue the visit at any time  Patient is aware this is a billable service  Subjective    Maggie Gonzalez is a 48 y o  female With known past medical history of gastroparesis, GERD, constipation, ADHD, type 2 diabetes mellitus, cataplexy, Wegener's granulomatosis, small fiber neuropathy, bipolar 1 disorder who is seen today via telemedicine for same-day visit for dry heaves and nausea  - Patient has presented to the emergency department 3 different times within the past week due to pyelonephritis  Patient was hospitalized at [facility] from 04/26/2021- 4/27/21 due to failing outpatient therapy for pyelonephritis  Today, patient notes since she has been discharged, she has not been able to hold any food or liquids down  Patient notes she is barely drinking 1 cup of water throughout the entire day  Patient notes she has tried eating some baby food to eat  Patient notes she is unable to take her medicines because she vomits them back up  Patient notes she has been experiencing a lot of dry heaves and also persistent diarrhea  Patient notes she is now experiencing more kidney pain again  Patient notes she has tried wearing scopolamine patch, but it does not help  Patient notes she also continues to have pain of her abdomen area  Patient notes she has now lost about 10 lb over the past week due to not eating  Patient denies any fevers, chills, constipation, sore throat, headaches, dizziness, shortness of breath, cough        Past Medical History:   Diagnosis Date    ADHD     Anemia of chronic disease     Anxiety     Asthma     Asthma     Borderline personality disorder ([facility] )     Cataplexy     Chronic abdominal pain     CKD (chronic kidney disease) stage 3, GFR 30-59 ml/min (HCC)     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[facility] )     Cushing syndrome ([place] [address] )     Diabetes mellitus ([place] [address] )     DVT (deep venous thrombosis) (HCC)     GERD (gastroesophageal reflux disease)     History of acute pancreatitis     felt secondary to Bactrim    History of transfusion     HTN (hypertension)     Hypertension     Liver disease     fatty liver    Microscopic polyangiitis (HCC)     Morbid obesity (HCC)     MPA (microscopic polyangiitis) (HCC)     Ovarian cyst     PTSD (post-traumatic stress disorder)     Renal disorder     Self-inflicted injury     self inflicted skin wounds    Wegener's granulomatosis with renal involvement ([place] [address] ) 2015       Past Surgical History:   Procedure Laterality Date    COLONOSCOPY      ESOPHAGOGASTRODUODENOSCOPY  09/11/2015    mild antral gastritis    GASTRIC STIMULATOR IMPLANT SURGERY  06/25/2020    LA COLONOSCOPY FLX DX W/COLLJ SPEC WHEN PFRMD N/A 12/14/2018    Procedure: COLONOSCOPY with polypectomy;  Surgeon: Salma Moon MD;  Location: [facility]; Service: Gastroenterology    LA ESOPHAGOGASTRODUODENOSCOPY TRANSORAL DIAGNOSTIC N/A 12/14/2018    Procedure: ESOPHAGOGASTRODUODENOSCOPY (EGD) with biopsy;  Surgeon: Salma Moon MD;  Location: [facility];   Service: Gastroenterology    RELEASE SCAR CONTRACTURE / GRAFT REPAIRS OF HAND Bilateral     UPPER GASTROINTESTINAL ENDOSCOPY  12/26/2019       Current Outpatient Medications   Medication Sig Dispense Refill    albuterol (2 5 mg/3 mL) 0 083 % nebulizer solution Take 1 vial (2 5 mg total) by nebulization every 6 (six) hours as needed for wheezing or shortness of breath 150 mL 0    albuterol (PROVENTIL HFA,VENTOLIN HFA) 90 mcg/act inhaler Inhale 2 puffs every 6 (six) hours as needed for wheezing or shortness of breath 18 g 1    Alcohol Swabs (ALCOHOL PADS) 70 % PADS by Does not apply route 4 (four) times a day 400 each 3    amLODIPine (NORVASC) 5 mg tablet TAKE ONE TABLET BY MOUTH ONCE DAILY 90 tablet 2    amphetamine-dextroamphetamine (ADDERALL XR) 20 MG 24 hr capsule Take 1 capsule (20 mg total) by mouth 2 (two) times a dayMax Daily Amount: 40 mg 60 capsule 0    atorvastatin (LIPITOR) 20 mg tablet Take 1 tablet (20 mg total) by mouth daily 90 tablet 1    Blood Glucose Monitoring Suppl (FREESTYLE LITE) DEIRDRE by Does not apply route daily 1 each 0    buPROPion (WELLBUTRIN XL) 300 mg 24 hr tablet Take 300 mg by mouth every morning       conjugated estrogens (Premarin) vaginal cream INSERT 1 GRAM PER VAGINA DAILY AT BEDTIME  FOR TWO WEEKS AND THEN CHANGE TO 1 GRAM TWICE WEEKLY (Patient not taking: Reported on 4/26/2021) 30 g 0    cycloSPORINE (RESTASIS) 0 05 % ophthalmic emulsion Administer 1 drop to both eyes 2 (two) times a day      dexlansoprazole (Dexilant) 60 MG capsule Take 1 capsule (60 mg total) by mouth daily 30 capsule 3    dicyclomine (BENTYL) 10 mg capsule       docusate sodium (COLACE) 100 mg capsule Take 1 capsule (100 mg total) by mouth 2 (two) times a day 180 capsule 1    Easy Comfort Lancets MISC USE TO TEST THE BLOOD SUGAR THREE TIMES A  each 10    FREESTYLE LITE test strip USE TO TEST THE BLOOD SUGAR THREE TIMES A  each 5    hydrocortisone 1 % cream Apply topically 4 (four) times a day as needed for irritation 56 g 1    insulin aspart (NovoLOG FlexPen) 100 UNIT/ML injection pen Inject 6 Units under the skin 3 (three) times a day with meals 15 mL 2    Insulin Pen Needle (PEN NEEDLES) 31G X 8 MM MISC Inject 1 Stick under the skin 4 (four) times a day (with meals and at bedtime) 100 each 6    lidocaine (XYLOCAINE) 5 % ointment APPLY TOPICALLY 2GM TWICE A DAY TO AFFECTED AREAS AS NEEDED FOR MILD PAIN 250 g 8    linaCLOtide (Linzess) 72 MCG CAPS Take 72 mcg by mouth daily 90 capsule 3    nitrofurantoin (MACROBID) 100 mg capsule Take 1 capsule (100 mg total) by mouth 2 (two) times a day 10 capsule 0    Nutritional Supplements (Ensure Original) LIQD Take 1 Bottle by mouth 2 (two) times a day 60 Bottle 11    ondansetron (ZOFRAN) 4 mg tablet Take 1 tablet (4 mg total) by mouth every 8 (eight) hours as needed for nausea or vomiting 30 tablet 2    oxyCODONE-acetaminophen (PERCOCET)  mg per tablet Take 1 tablet by mouth every 6 (six) hours as needed for moderate painMax Daily Amount: 4 tablets 120 tablet 0    pantoprazole (PROTONIX) 40 mg tablet Take 1 tablet (40 mg total) by mouth 2 (two) times a day before meals 60 tablet 5    polyethylene glycol (GLYCOLAX) 17 GM/SCOOP powder DISSOLVE 17 GRAMS(ONE CAPFUL) IN 8 OUNCE OF WATER/JUICE & TAKE BY MOUTH DAILY AS DIRECTED *MAXIMUM ONE DOSE DAILY* (Patient not taking: Reported on 4/26/2021) 510 g 1    Probiotic Product (PROBIOTIC-10 PO) Take 1 tablet by mouth daily      QUEtiapine (SEROquel) 200 mg tablet Take 150 mg by mouth daily at bedtime       scopolamine (TRANSDERM-SCOP) 1 5 mg/3 days TD 72 hr patch Place 1 patch on the skin every third day (Patient not taking: Reported on 4/26/2021) 10 patch 3    scopolamine (TRANSDERM-SCOP) 1 5 mg/3 days TD 72 hr patch Place 1 patch on the skin every third day 10 patch 0    TiZANidine (ZANAFLEX) 4 MG capsule TAKE ONE CAPSULE BY MOUTH THREE TIMES A DAY 90 capsule 1    Toujeo Max SoloStar 300 units/mL CONCETRATED U-300 injection pen (2-unit dial) INJECT 22 UNITS UNDER THE SKIN DAILY 3 pen 1     No current facility-administered medications for this visit           Allergies   Allergen Reactions    Prozac [Fluoxetine Hcl]      SI    Bactrim [Sulfamethoxazole-Trimethoprim]      Pt "They think that is what cause the pancreatitis"     Lamictal [Lamotrigine] GI Intolerance    Lithium Other (See Comments)    Haldol [Haloperidol] Other (See Comments)     "I don't like it"    Ibuprofen     Lexapro [Escitalopram Oxalate] Rash    Navane [Thiothixene]      SI    Other      "novaine?" antipsychotic       Review of Systems   Constitutional: Positive for unexpected weight change (10 lb weight loss over the past week)  Negative for chills and fever  HENT: Negative for congestion and sore throat  Eyes: Negative for pain and visual disturbance  Respiratory: Negative for cough, chest tightness and shortness of breath  Cardiovascular: Negative for chest pain and palpitations  Gastrointestinal: Positive for abdominal pain, diarrhea, nausea and vomiting ("dry heaving")  Negative for constipation  Genitourinary: Negative for difficulty urinating and dysuria  Musculoskeletal: Positive for arthralgias, gait problem and myalgias  Neurological: Negative for dizziness and headaches  Psychiatric/Behavioral: The patient is not nervous/anxious  There were no vitals filed for this visit  I spent 14 minutes directly with the patient during this visit     It was my intent to perform this visit via video technology but the patient was not able to do a video connection so the visit was completed via audio telephone only  VIRTUAL VISIT DISCLAIMER    Juan Antonio Barbosa acknowledges that she has consented to an online visit or consultation  She understands that the online visit is based solely on information provided by her, and that, in the absence of a face-to-face physical evaluation by the physician, the diagnosis she receives is both limited and provisional in terms of accuracy and completeness  This is not intended to replace a full medical face-to-face evaluation by the physician  Juan Antonio Barbosa understands and accepts these terms

## 2021-04-29 NOTE — TELEPHONE ENCOUNTER
Called Pt to address issue with still not being able to eat or drink  Pt informed that she was able to get a hold of the front staff and set up an appt with PCP later today at 3:20 no questions or concerns at that time

## 2021-04-30 ENCOUNTER — APPOINTMENT (OUTPATIENT)
Dept: ULTRASOUND IMAGING | Facility: HOSPITAL | Age: 51
End: 2021-04-30
Payer: COMMERCIAL

## 2021-04-30 LAB
ANION GAP SERPL CALCULATED.3IONS-SCNC: 13 MMOL/L (ref 4–13)
BASOPHILS # BLD AUTO: 0.03 THOUSANDS/ΜL (ref 0–0.1)
BASOPHILS NFR BLD AUTO: 1 % (ref 0–1)
BUN SERPL-MCNC: 34 MG/DL (ref 5–25)
CALCIUM SERPL-MCNC: 8.3 MG/DL (ref 8.3–10.1)
CHLORIDE SERPL-SCNC: 108 MMOL/L (ref 100–108)
CO2 SERPL-SCNC: 23 MMOL/L (ref 21–32)
CREAT SERPL-MCNC: 2.72 MG/DL (ref 0.6–1.3)
EOSINOPHIL # BLD AUTO: 0.04 THOUSAND/ΜL (ref 0–0.61)
EOSINOPHIL NFR BLD AUTO: 1 % (ref 0–6)
ERYTHROCYTE [DISTWIDTH] IN BLOOD BY AUTOMATED COUNT: 13.2 % (ref 11.6–15.1)
GFR SERPL CREATININE-BSD FRML MDRD: 20 ML/MIN/1.73SQ M
GLUCOSE SERPL-MCNC: 108 MG/DL (ref 65–140)
GLUCOSE SERPL-MCNC: 110 MG/DL (ref 65–140)
GLUCOSE SERPL-MCNC: 112 MG/DL (ref 65–140)
GLUCOSE SERPL-MCNC: 154 MG/DL (ref 65–140)
GLUCOSE SERPL-MCNC: 92 MG/DL (ref 65–140)
GLUCOSE SERPL-MCNC: 94 MG/DL (ref 65–140)
HCT VFR BLD AUTO: 35.9 % (ref 34.8–46.1)
HGB BLD-MCNC: 11.8 G/DL (ref 11.5–15.4)
IMM GRANULOCYTES # BLD AUTO: 0.02 THOUSAND/UL (ref 0–0.2)
IMM GRANULOCYTES NFR BLD AUTO: 0 % (ref 0–2)
LYMPHOCYTES # BLD AUTO: 1.86 THOUSANDS/ΜL (ref 0.6–4.47)
LYMPHOCYTES NFR BLD AUTO: 35 % (ref 14–44)
MCH RBC QN AUTO: 30.6 PG (ref 26.8–34.3)
MCHC RBC AUTO-ENTMCNC: 32.9 G/DL (ref 31.4–37.4)
MCV RBC AUTO: 93 FL (ref 82–98)
MONOCYTES # BLD AUTO: 0.84 THOUSAND/ΜL (ref 0.17–1.22)
MONOCYTES NFR BLD AUTO: 16 % (ref 4–12)
NEUTROPHILS # BLD AUTO: 2.54 THOUSANDS/ΜL (ref 1.85–7.62)
NEUTS SEG NFR BLD AUTO: 47 % (ref 43–75)
NRBC BLD AUTO-RTO: 0 /100 WBCS
PLATELET # BLD AUTO: 242 THOUSANDS/UL (ref 149–390)
PMV BLD AUTO: 9.7 FL (ref 8.9–12.7)
POTASSIUM SERPL-SCNC: 3.5 MMOL/L (ref 3.5–5.3)
RBC # BLD AUTO: 3.85 MILLION/UL (ref 3.81–5.12)
SODIUM SERPL-SCNC: 144 MMOL/L (ref 136–145)
WBC # BLD AUTO: 5.33 THOUSAND/UL (ref 4.31–10.16)

## 2021-04-30 PROCEDURE — 80048 BASIC METABOLIC PNL TOTAL CA: CPT | Performed by: NURSE PRACTITIONER

## 2021-04-30 PROCEDURE — C9113 INJ PANTOPRAZOLE SODIUM, VIA: HCPCS | Performed by: NURSE PRACTITIONER

## 2021-04-30 PROCEDURE — 87505 NFCT AGENT DETECTION GI: CPT | Performed by: NURSE PRACTITIONER

## 2021-04-30 PROCEDURE — 36415 COLL VENOUS BLD VENIPUNCTURE: CPT | Performed by: NURSE PRACTITIONER

## 2021-04-30 PROCEDURE — 82948 REAGENT STRIP/BLOOD GLUCOSE: CPT

## 2021-04-30 PROCEDURE — 76770 US EXAM ABDO BACK WALL COMP: CPT

## 2021-04-30 PROCEDURE — 99225 PR SBSQ OBSERVATION CARE/DAY 25 MINUTES: CPT | Performed by: INTERNAL MEDICINE

## 2021-04-30 PROCEDURE — 85025 COMPLETE CBC W/AUTO DIFF WBC: CPT | Performed by: NURSE PRACTITIONER

## 2021-04-30 RX ORDER — ALBUTEROL SULFATE 90 UG/1
2 AEROSOL, METERED RESPIRATORY (INHALATION) EVERY 4 HOURS PRN
Status: DISCONTINUED | OUTPATIENT
Start: 2021-04-30 | End: 2021-05-01 | Stop reason: HOSPADM

## 2021-04-30 RX ORDER — ATORVASTATIN CALCIUM 20 MG/1
20 TABLET, FILM COATED ORAL DAILY
Status: DISCONTINUED | OUTPATIENT
Start: 2021-04-30 | End: 2021-05-01 | Stop reason: HOSPADM

## 2021-04-30 RX ORDER — INSULIN GLARGINE 100 [IU]/ML
15 INJECTION, SOLUTION SUBCUTANEOUS EVERY MORNING
Status: DISCONTINUED | OUTPATIENT
Start: 2021-04-30 | End: 2021-05-01 | Stop reason: HOSPADM

## 2021-04-30 RX ORDER — AMLODIPINE BESYLATE 5 MG/1
5 TABLET ORAL DAILY
Status: DISCONTINUED | OUTPATIENT
Start: 2021-04-30 | End: 2021-05-01 | Stop reason: HOSPADM

## 2021-04-30 RX ORDER — ONDANSETRON 2 MG/ML
4 INJECTION INTRAMUSCULAR; INTRAVENOUS EVERY 6 HOURS PRN
Status: DISCONTINUED | OUTPATIENT
Start: 2021-04-30 | End: 2021-05-01 | Stop reason: HOSPADM

## 2021-04-30 RX ORDER — SODIUM CHLORIDE 9 MG/ML
100 INJECTION, SOLUTION INTRAVENOUS CONTINUOUS
Status: DISCONTINUED | OUTPATIENT
Start: 2021-04-30 | End: 2021-04-30

## 2021-04-30 RX ORDER — SIMETHICONE 80 MG
80 TABLET,CHEWABLE ORAL 4 TIMES DAILY PRN
Status: DISCONTINUED | OUTPATIENT
Start: 2021-04-30 | End: 2021-05-01 | Stop reason: HOSPADM

## 2021-04-30 RX ORDER — PANTOPRAZOLE SODIUM 40 MG/1
40 INJECTION, POWDER, FOR SOLUTION INTRAVENOUS
Status: DISCONTINUED | OUTPATIENT
Start: 2021-04-30 | End: 2021-05-01

## 2021-04-30 RX ORDER — DEXTROAMPHETAMINE SACCHARATE, AMPHETAMINE ASPARTATE, DEXTROAMPHETAMINE SULFATE AND AMPHETAMINE SULFATE 2.5; 2.5; 2.5; 2.5 MG/1; MG/1; MG/1; MG/1
20 TABLET ORAL
Status: DISCONTINUED | OUTPATIENT
Start: 2021-04-30 | End: 2021-05-01 | Stop reason: HOSPADM

## 2021-04-30 RX ORDER — HYDROMORPHONE HCL/PF 1 MG/ML
0.2 SYRINGE (ML) INJECTION EVERY 6 HOURS PRN
Status: DISCONTINUED | OUTPATIENT
Start: 2021-04-30 | End: 2021-05-01 | Stop reason: HOSPADM

## 2021-04-30 RX ORDER — BUPROPION HYDROCHLORIDE 150 MG/1
300 TABLET ORAL EVERY MORNING
Status: DISCONTINUED | OUTPATIENT
Start: 2021-04-30 | End: 2021-05-01 | Stop reason: HOSPADM

## 2021-04-30 RX ORDER — OXYCODONE HYDROCHLORIDE 10 MG/1
10 TABLET ORAL EVERY 6 HOURS PRN
Status: DISCONTINUED | OUTPATIENT
Start: 2021-04-30 | End: 2021-05-01 | Stop reason: HOSPADM

## 2021-04-30 RX ORDER — HYDROMORPHONE HCL/PF 1 MG/ML
0.2 SYRINGE (ML) INJECTION EVERY 4 HOURS PRN
Status: DISCONTINUED | OUTPATIENT
Start: 2021-04-30 | End: 2021-04-30

## 2021-04-30 RX ORDER — SACCHAROMYCES BOULARDII 250 MG
250 CAPSULE ORAL 2 TIMES DAILY
Status: DISCONTINUED | OUTPATIENT
Start: 2021-04-30 | End: 2021-05-01 | Stop reason: HOSPADM

## 2021-04-30 RX ORDER — OXYCODONE HYDROCHLORIDE AND ACETAMINOPHEN 5; 325 MG/1; MG/1
1 TABLET ORAL EVERY 6 HOURS PRN
Status: DISCONTINUED | OUTPATIENT
Start: 2021-04-30 | End: 2021-04-30

## 2021-04-30 RX ORDER — CALCIUM CARBONATE 200(500)MG
1000 TABLET,CHEWABLE ORAL 3 TIMES DAILY PRN
Status: DISCONTINUED | OUTPATIENT
Start: 2021-04-30 | End: 2021-05-01 | Stop reason: HOSPADM

## 2021-04-30 RX ORDER — METOCLOPRAMIDE HYDROCHLORIDE 5 MG/ML
5 INJECTION INTRAMUSCULAR; INTRAVENOUS EVERY 6 HOURS PRN
Status: DISCONTINUED | OUTPATIENT
Start: 2021-04-30 | End: 2021-05-01 | Stop reason: HOSPADM

## 2021-04-30 RX ORDER — ACETAMINOPHEN 325 MG/1
650 TABLET ORAL EVERY 6 HOURS PRN
Status: DISCONTINUED | OUTPATIENT
Start: 2021-04-30 | End: 2021-05-01 | Stop reason: HOSPADM

## 2021-04-30 RX ADMIN — SODIUM CHLORIDE 100 ML/HR: 0.9 INJECTION, SOLUTION INTRAVENOUS at 01:12

## 2021-04-30 RX ADMIN — OXYCODONE HYDROCHLORIDE 10 MG: 10 TABLET ORAL at 08:48

## 2021-04-30 RX ADMIN — PANTOPRAZOLE SODIUM 40 MG: 40 INJECTION, POWDER, FOR SOLUTION INTRAVENOUS at 01:08

## 2021-04-30 RX ADMIN — BUPROPION HYDROCHLORIDE 300 MG: 150 TABLET, EXTENDED RELEASE ORAL at 09:01

## 2021-04-30 RX ADMIN — OXYCODONE HYDROCHLORIDE 10 MG: 10 TABLET ORAL at 15:00

## 2021-04-30 RX ADMIN — ONDANSETRON 4 MG: 2 INJECTION INTRAMUSCULAR; INTRAVENOUS at 08:59

## 2021-04-30 RX ADMIN — CEFTRIAXONE SODIUM 1000 MG: 10 INJECTION, POWDER, FOR SOLUTION INTRAVENOUS at 01:08

## 2021-04-30 RX ADMIN — ATORVASTATIN CALCIUM 20 MG: 20 TABLET, FILM COATED ORAL at 08:49

## 2021-04-30 RX ADMIN — AMLODIPINE BESYLATE 5 MG: 5 TABLET ORAL at 08:49

## 2021-04-30 RX ADMIN — Medication 250 MG: at 08:49

## 2021-04-30 RX ADMIN — QUETIAPINE 150 MG: 100 TABLET, FILM COATED ORAL at 22:08

## 2021-04-30 RX ADMIN — HYDROMORPHONE HYDROCHLORIDE 0.2 MG: 1 INJECTION, SOLUTION INTRAMUSCULAR; INTRAVENOUS; SUBCUTANEOUS at 11:42

## 2021-04-30 RX ADMIN — HYDROMORPHONE HYDROCHLORIDE 0.2 MG: 1 INJECTION, SOLUTION INTRAMUSCULAR; INTRAVENOUS; SUBCUTANEOUS at 19:57

## 2021-04-30 RX ADMIN — PANTOPRAZOLE SODIUM 40 MG: 40 INJECTION, POWDER, FOR SOLUTION INTRAVENOUS at 10:03

## 2021-04-30 RX ADMIN — DEXTROAMPHETAMINE SACCHARATE, AMPHETAMINE ASPARTATE, DEXTROAMPHETAMINE SULFATE AND AMPHETAMINE SULFATE 20 MG: 2.5; 2.5; 2.5; 2.5 TABLET ORAL at 10:02

## 2021-04-30 RX ADMIN — Medication 250 MG: at 17:40

## 2021-04-30 RX ADMIN — OXYCODONE HYDROCHLORIDE 10 MG: 10 TABLET ORAL at 22:07

## 2021-04-30 RX ADMIN — QUETIAPINE 150 MG: 100 TABLET, FILM COATED ORAL at 01:07

## 2021-04-30 RX ADMIN — HYDROMORPHONE HYDROCHLORIDE 0.2 MG: 1 INJECTION, SOLUTION INTRAMUSCULAR; INTRAVENOUS; SUBCUTANEOUS at 06:22

## 2021-04-30 RX ADMIN — DEXTROAMPHETAMINE SACCHARATE, AMPHETAMINE ASPARTATE, DEXTROAMPHETAMINE SULFATE AND AMPHETAMINE SULFATE 20 MG: 2.5; 2.5; 2.5; 2.5 TABLET ORAL at 12:58

## 2021-04-30 RX ADMIN — INSULIN LISPRO 1 UNITS: 100 INJECTION, SOLUTION INTRAVENOUS; SUBCUTANEOUS at 12:34

## 2021-04-30 NOTE — ASSESSMENT & PLAN NOTE
· Reports poor appetite and persistent diarrhea; h/o irritable bowel syndrome  · Will obtain stool for C diff and enteric bacterial panel PCR     · Add probiotic

## 2021-04-30 NOTE — PLAN OF CARE
Problem: Potential for Falls  Goal: Patient will remain free of falls  Description: INTERVENTIONS:  - Assess patient frequently for physical needs  -  Identify cognitive and physical deficits and behaviors that affect risk of falls  -  [place] fall precautions as indicated by assessment   - Educate patient/family on patient safety including physical limitations  - Instruct patient to call for assistance with activity based on assessment  - Modify environment to reduce risk of injury  - Consider OT/PT consult to assist with strengthening/mobility  4/30/2021 1638 by Benjamin Apple RN  Outcome: Progressing  4/30/2021 1637 by Benjamin Apple RN  Outcome: Progressing     Problem: Prexisting or High Potential for Compromised Skin Integrity  Goal: Skin integrity is maintained or improved  Description: INTERVENTIONS:  - Identify patients at risk for skin breakdown  - Assess and monitor skin integrity  - Assess and monitor nutrition and hydration status  - Monitor labs   - Assess for incontinence   - Turn and reposition patient  - Assist with mobility/ambulation  - Relieve pressure over bony prominences  - Avoid friction and shearing  - Provide appropriate hygiene as needed including keeping skin clean and dry  - Evaluate need for skin moisturizer/barrier cream  - Collaborate with interdisciplinary team   - Patient/family teaching  - Consider wound care consult   4/30/2021 1638 by Benjamin Apple RN  Outcome: Progressing  4/30/2021 1637 by Benjamin Apple RN  Outcome: Progressing     Problem: Nutrition/Hydration-ADULT  Goal: Nutrient/Hydration intake appropriate for improving, restoring or maintaining nutritional needs  Description: Monitor and assess patient's nutrition/hydration status for malnutrition  Collaborate with interdisciplinary team and initiate plan and interventions as ordered  Monitor patient's weight and dietary intake as ordered or per policy  Utilize nutrition screening tool and intervene as necessary  Determine patient's food preferences and provide high-protein, high-caloric foods as appropriate       INTERVENTIONS:  - Monitor oral intake, urinary output, labs, and treatment plans  - Assess nutrition and hydration status and recommend course of action  - Evaluate amount of meals eaten  - Assist patient with eating if necessary   - Allow adequate time for meals  - Recommend/ encourage appropriate diets, oral nutritional supplements, and vitamin/mineral supplements  - Order, calculate, and assess calorie counts as needed  - Recommend, monitor, and adjust tube feedings and TPN/PPN based on assessed needs  - Assess need for intravenous fluids  - Provide specific nutrition/hydration education as appropriate  - Include patient/family/caregiver in decisions related to nutrition  4/30/2021 1638 by Braydon Meza RN  Outcome: Progressing  4/30/2021 1637 by Braydon Meza RN  Outcome: Progressing     Problem: METABOLIC, FLUID AND ELECTROLYTES - ADULT  Goal: Electrolytes maintained within normal limits  Description: INTERVENTIONS:  - Monitor labs and assess patient for signs and symptoms of electrolyte imbalances  - Administer electrolyte replacement as ordered  - Monitor response to electrolyte replacements, including repeat lab results as appropriate  - Instruct patient on fluid and nutrition as appropriate  Outcome: Progressing  Goal: Fluid balance maintained  Description: INTERVENTIONS:  - Monitor labs   - Monitor I/O and WT  - Instruct patient on fluid and nutrition as appropriate  - Assess for signs & symptoms of volume excess or deficit  Outcome: Progressing  Goal: Glucose maintained within target range  Description: INTERVENTIONS:  - Monitor Blood Glucose as ordered  - Assess for signs and symptoms of hyperglycemia and hypoglycemia  - Administer ordered medications to maintain glucose within target range  - Assess nutritional intake and initiate nutrition service referral as needed  Outcome: Progressing     Problem: HEMATOLOGIC - ADULT  Goal: Maintains hematologic stability  Description: INTERVENTIONS  - Assess for signs and symptoms of bleeding or hemorrhage  - Monitor labs  - Administer supportive blood products/factors as ordered and appropriate  Outcome: Progressing     Problem: MUSCULOSKELETAL - ADULT  Goal: Maintain or return mobility to safest level of function  Description: INTERVENTIONS:  - Assess patient's ability to carry out ADLs; assess patient's baseline for ADL function and identify physical deficits which impact ability to perform ADLs (bathing, care of mouth/teeth, toileting, grooming, dressing, etc )  - Assess/evaluate cause of self-care deficits   - Assess range of motion  - Assess patient's mobility  - Assess patient's need for assistive devices and provide as appropriate  - Encourage maximum independence but intervene and supervise when necessary  - Involve family in performance of ADLs  - Assess for home care needs following discharge   - Consider OT consult to assist with ADL evaluation and planning for discharge  - Provide patient education as appropriate  Outcome: Progressing  Goal: Maintain proper alignment of affected body part  Description: INTERVENTIONS:  - Support, maintain and protect limb and body alignment  - Provide patient/ family with appropriate education  Outcome: Progressing     Problem: PAIN - ADULT  Goal: Verbalizes/displays adequate comfort level or baseline comfort level  Description: Interventions:  - Encourage patient to monitor pain and request assistance  - Assess pain using appropriate pain scale  - Administer analgesics based on type and severity of pain and evaluate response  - Implement non-pharmacological measures as appropriate and evaluate response  - Consider cultural and social influences on pain and pain management  - Notify physician/advanced practitioner if interventions unsuccessful or patient reports new pain  Outcome: Progressing     Problem: INFECTION - ADULT  Goal: Absence or prevention of progression during hospitalization  Description: INTERVENTIONS:  - Assess and monitor for signs and symptoms of infection  - Monitor lab/diagnostic results  - Monitor all insertion sites, i e  indwelling lines, tubes, and drains  - Monitor endotracheal if appropriate and nasal secretions for changes in amount and color  - [place] appropriate cooling/warming therapies per order  - Administer medications as ordered  - Instruct and encourage patient and family to use good hand hygiene technique  - Identify and instruct in appropriate isolation precautions for identified infection/condition  Outcome: Progressing  Goal: Absence of fever/infection during neutropenic period  Description: INTERVENTIONS:  - Monitor WBC    Outcome: Progressing     Problem: DISCHARGE PLANNING  Goal: Discharge to home or other facility with appropriate resources  Description: INTERVENTIONS:  - Identify barriers to discharge w/patient and caregiver  - Arrange for needed discharge resources and transportation as appropriate  - Identify discharge learning needs (meds, wound care, etc )  - Arrange for interpretive services to assist at discharge as needed  - Refer to Case Management Department for coordinating discharge planning if the patient needs post-hospital services based on physician/advanced practitioner order or complex needs related to functional status, cognitive ability, or social support system  Outcome: Progressing     Problem: Knowledge Deficit  Goal: Patient/family/caregiver demonstrates understanding of disease process, treatment plan, medications, and discharge instructions  Description: Complete learning assessment and assess knowledge base    Interventions:  - Provide teaching at level of understanding  - Provide teaching via preferred learning methods  Outcome: Progressing

## 2021-04-30 NOTE — ASSESSMENT & PLAN NOTE
· Admitted 4/26 for pyelonephritis and discharged the following day with Rx for Macrobid    Unable to tolerate p o , will re-start IV Rocephin  · Afebrile, no leukocytosis  · Urine culture on 03/23 showed alpha hemolytic Streptococcus; possibly contaminant  · Blood culture no growth at 72 hours

## 2021-04-30 NOTE — PROGRESS NOTES
[address] 48  Progress Note - iGsela Miller 1970, 48 y o  female MRN: [id number]  Unit/Bed#: ED 16 Encounter: [id number]  Primary Care Provider: Nathalie Holt PA-C   Date and time admitted to hospital: 4/29/2021  6:30 PM    * Acute renal failure superimposed on stage 3 chronic kidney disease ([facility] [address] )  Assessment & Plan  · Creatinine 2 8 on admission, baseline 1 5-2 1  · Suspect due to prerenal because decreased oral intake  · Reports diarrhea and emesis, unable to tolerate anything orally  · Creatinine slightly better this morning  2 7  · Continue IV hydration  · Avoid nephrotoxins  · Monitor lytes  · Monitor BMP    Pyelonephritis  Assessment & Plan  · Admitted 4/26 for pyelonephritis and discharged the following day with Rx for Macrobid  · Afebrile, no leukocytosis  · Urine culture on 03/23 showed alpha hemolytic Streptococcus; possibly contaminant  · Blood culture no growth at 72 hours  · Continue IV ceftriaxone for now  · Recheck renal ultrasound to rule out an abscess/complications from pyelonephritis given persistent symptoms of left flank pain  Patient reports nausea after getting Dilaudid  · Will decrease her Dilaudid to every 6 hours  Cont prn oxy  Pt said she does not want opioids as much as possible  Intractable abdominal pain  Assessment & Plan  · Was just admitted 4/26-4/27 and treated for pyelonephritis  This is her 4th presentation to ED with same complaint of flank and abdominal pain  · RUQ US: No evidence of cholelithiasis, cholecystitis or biliary obstruction  2   Mildly increased echogenicity in the right renal cortex could represent medical renal disease  No hydronephrosis  · Apr 23: CT scan A/P:  Negative for acute pathology to account for patient's symptoms  · Patient reports improvement in pain this morning    · Pain control  · IV PPI daily    Diarrhea of presumed infectious origin  Assessment & Plan  · Reports poor appetite and persistent diarrhea; h/o irritable bowel syndrome  · Follow-up stool for C diff and enteric bacterial panel PCR  Type 2 diabetes mellitus with renal complication [facility])  Assessment & Plan  Lab Results   Component Value Date    HGBA1C 6 0 02/15/2021     Recent Labs     21  0106 21  0922   POCGLU 94 112       Blood Sugar Average: Last 72 hrs:  (P) 103       Continue Lantus 15 units with sliding scale insulin for now  VTE Pharmacologic Prophylaxis:   Pharmacologic: Pharmacologic VTE Prophylaxis contraindicated due to low risk  Mechanical VTE Prophylaxis in Place: Yes    Patient Centered Rounds: I have performed bedside rounds with nursing staff today  Discussions with Specialists or Other Care Team Provider:     Education and Discussions with Family / Patient: pt    Time Spent for Care: 30 minutes  More than 50% of total time spent on counseling and coordination of care as described above  Current Length of Stay: 0 day(s)    Current Patient Status: Observation   Certification Statement: The patient will continue to require additional inpatient hospital stay due to above    Discharge Plan:     Code Status: Level 1 - Full Code      Subjective:   Pt seen and examined by me this morning  Pt said she is feeling a little better today but still has significant left-sided flank pain and left-sided abdominal pain    Objective:     Vitals:   Temp (24hrs), Av 1 °F (36 7 °C), Min:97 6 °F (36 4 °C), Max:98 4 °F (36 9 °C)    Temp:  [97 6 °F (36 4 °C)-98 4 °F (36 9 °C)] 97 6 °F (36 4 °C)  HR:  [60-90] 72  Resp:  [16-18] 17  BP: (105-129)/(60-82) 105/60  SpO2:  [97 %-100 %] 99 %  Body mass index is 29 48 kg/m²  Input and Output Summary (last 24 hours): Intake/Output Summary (Last 24 hours) at 2021 1138  Last data filed at 2021 0307  Gross per 24 hour   Intake 248 33 ml   Output --   Net 248 33 ml       Physical Exam:     Physical Exam    Constitutional: Pt appears comfortable   Not in any acute distress  HENT:   Head: Normocephalic and atraumatic  Eyes: EOM are normal    Neck: Neck supple  Cardiovascular: Normal rate, regular rhythm, normal heart sounds  No murmur heard  Pulmonary/Chest: Effort normal, air entry b/l equal  No respiratory distress  Pt has no wheezes or crackles  Abdominal: Soft  Non-distended, left-sided abdominal pain, no guarding or rigidity  Bowel sounds are normal   Left flank tenderness  Neurological: awake, alert  Moving all extremities spontaneously  Psychiatric: normal mood and affect  Additional Data:     Labs:    Results from last 7 days   Lab Units 04/30/21  0618   WBC Thousand/uL 5 33   HEMOGLOBIN g/dL 11 8   HEMATOCRIT % 35 9   PLATELETS Thousands/uL 242   NEUTROS PCT % 47   LYMPHS PCT % 35   MONOS PCT % 16*   EOS PCT % 1     Results from last 7 days   Lab Units 04/30/21  0618 04/29/21  1945   SODIUM mmol/L 144 139   POTASSIUM mmol/L 3 5 3 9   CHLORIDE mmol/L 108 105   CO2 mmol/L 23 22   BUN mg/dL 34* 37*   CREATININE mg/dL 2 72* 2 86*   ANION GAP mmol/L 13 12   CALCIUM mg/dL 8 3 8 9   ALBUMIN g/dL  --  4 0   TOTAL BILIRUBIN mg/dL  --  0 22   ALK PHOS U/L  --  129*   ALT U/L  --  26   AST U/L  --  21   GLUCOSE RANDOM mg/dL 92 151*     Results from last 7 days   Lab Units 04/26/21  1727   INR  1 07     Results from last 7 days   Lab Units 04/30/21  1124 04/30/21  0922 04/30/21  0106 04/26/21  2140   POC GLUCOSE mg/dl 154* 112 94 81         Results from last 7 days   Lab Units 04/27/21  0623 04/26/21  1727   LACTIC ACID mmol/L  --  1 0   PROCALCITONIN ng/ml <0 05 <0 05           * I Have Reviewed All Lab Data Listed Above  * Additional Pertinent Lab Tests Reviewed:  [address] Admission Reviewed    Imaging:    Imaging Reports Reviewed Today Include:   Imaging Personally Reviewed by Myself Includes:      Recent Cultures (last 7 days):     Results from last 7 days   Lab Units 04/26/21  1730 04/26/21  1727 04/25/21  1428   BLOOD CULTURE  No Growth at 72 hrs  No Growth at 72 hrs   --    URINE CULTURE   --   --  No Growth <1000 cfu/mL       Last 24 Hours Medication List:   Current Facility-Administered Medications   Medication Dose Route Frequency Provider Last Rate    acetaminophen  650 mg Oral Q6H PRN Maryln Gurpreet, CRNP      albuterol  2 puff Inhalation Q4H PRN Maryln Gurpreet, CRNP      amLODIPine  5 mg Oral Daily Maryln Gurpreet, CRNP      amphetamine-dextroamphetamine  20 mg Oral BID before breakfast/lunch Maryln Gurpreet, CRNP      atorvastatin  20 mg Oral Daily Maryln Gurpreet, CRNP      buPROPion  300 mg Oral QAM Maryln Gurpreet, CRNP      calcium carbonate  1,000 mg Oral TID PRN Maryln Gurpreet, CRNP      cefTRIAXone  1,000 mg Intravenous Q24H Maryln Gurpreet, CRNP Stopped (04/30/21 9950)    HYDROmorphone  0 2 mg Intravenous Q6H PRN Nicole Jane MD      insulin glargine  15 Units Subcutaneous QAM Maryln Gurpreet, CRNP      insulin lispro  1-5 Units Subcutaneous 4x Daily (AC & HS) Maryln Gurpreet, CRNP      metoclopramide  5 mg Intravenous Q6H PRN Maryln Gurpreet, CRNP      ondansetron  4 mg Intravenous Q6H PRN Maryln Gurpreet, CRNP      oxyCODONE  10 mg Oral Q6H PRN Maryln Gurpreet, CRNP      pantoprazole  40 mg Intravenous Q24H [address] Maryln Gurpreet, CRNP      QUEtiapine  150 mg Oral HS Maryln Gurpreet, CRNP      saccharomyces boulardii  250 mg Oral BID Maryln Gurpreet, CRNP      simethicone  80 mg Oral 4x Daily PRN Maryln Gurpreet, CRNP      sodium chloride  100 mL/hr Intravenous Continuous Nicole Jane  mL/hr (04/30/21 0112)        Today, Patient Was Seen By: Niocle Jane MD    ** Please Note: Dictation voice to text software may have been used in the creation of this document   **

## 2021-04-30 NOTE — UTILIZATION REVIEW
Initial Clinical Review    Admission: Date/Time/Statement:   Admission Orders (From admission, onward)     Ordered        04/29/21 2346  Place in Observation  Once                   Orders Placed This Encounter   Procedures    Place in Observation     Standing Status:   Standing     Number of Occurrences:   1     Order Specific Question:   Level of Care     Answer:   Med Surg [16]     ED Arrival Information     Expected Arrival [address] of Arrival Escorted By Service Admission Type    4/29/2021 4/29/2021 17:07 Urgent Walk-In Friend Hospitalist Urgent    Arrival Complaint    Pyelonephritis        Chief Complaint   Patient presents with    Back Pain     Pt reports seen at ED 3 times since friday  Pt reports stage 4 kidney problems and pt reports increased pain, back pain, diarrhea  Initial Presentation: 48 y o  female who presents with c/o left flank pain, abdominal pain, nausea, emesis and diarrhea  Reports symptoms present since last Wednesday, has been seen in ED 4 times with same complaint, was admitted 4/26-4/27 and treated for MARY and pyelonephritis, discharged home with script for Macrobid - reports compliance  Patient reports she is unable to tolerate anything orally, watery diarrhea,  dark urine output  H/o IBS, states symptoms are different than her usual IBS flare  ED Findings: Mild tenderness to palpation of right upper quadrant and epigastric area  Cr 2 86, alk phos 129,  US shows No evidence of cholelithiasis, cholecystitis or biliary obstruction  Date: 4/29 Admitted to Observation with MARY  Cr 2 86 (baseline cr 1 5-2 1),  N/V/D, Abdominal pain, Pyelonephritis  Plan is for IVF's, clear liquids,  Monitor lytes & bmp, prn antiemetics,  IV Rocephin, IV PPI, pain control, stool for C diff and enteric bacterial panel PCR  Day 2: Feeling a little better today but still has significant left-sided flank pain and left-sided abdominal pain  Cr 2 7  Continue IVF's and IV Rocephin  Recheck renal us    ED Triage Vitals [04/29/21 1735]   Temperature Pulse Respirations Blood Pressure SpO2   98 4 °F (36 9 °C) 90 18 124/82 98 %      Temp Source Heart Rate Source Patient Position - Orthostatic VS BP Location FiO2 (%)   Oral Monitor Sitting Right arm --      Pain Score       8          Wt Readings from Last 1 Encounters:   04/30/21 73 1 kg (161 lb 2 5 oz)     Additional Vital Signs:    04/30/21 14:39:03  98 9 °F (37 2 °C)  81  20  117/73  88  98 %  None (Room air) Lying   04/30/21 1405  --  70  18  110/85  --  99 %  None (Room air) Lying   04/30/21 1127  --  72  17  105/60  --  99 %  None (Room air) Lying   04/30/21 1010  --  63  18  112/68  --  100 %  None (Room air) Lying   04/30/21 0615  97 6 °F (36 4 °C)  67  16  113/66  82  99 %  None (Room air) --   04/30/21 0018  98 4 °F (36 9 °C)  74  16  129/74  --  98 %  None (Room air) Sitting   04/29/21 2314  --  67  16  124/73  --  97 %  None (Room air) Lying   04/29/21 2300  --  60  16  120/81  97  98 %  None (Room air) Lying   04/29/21 2200  --  64  17  111/66  85  97 %  None (Room air) Lying   04/29/21 2159  --  65  17  111/66  --  97 %  None (Room air) Lying   04/29/21 2035  --  69  18  124/70  --  98 %  None (Room air) Lying   04/29/21 1836  --  78  18  115/78  --  98 %  None (Room air) Lying     Pertinent Labs/Diagnostic Test Results:   Results from last 7 days   Lab Units 04/29/21 1945   SARS-COV-2  Negative     Results from last 7 days   Lab Units 04/30/21 0618 04/29/21 1945 04/27/21  0612 04/26/21  1727 04/25/21  1401   WBC Thousand/uL 5 33 6 32 4 26* 5 71 7 18   HEMOGLOBIN g/dL 11 8 12 7 11 2* 12 7 12 7   HEMATOCRIT % 35 9 39 0 34 3* 38 6 37 7   PLATELETS Thousands/uL 242 279 206 258 264   NEUTROS ABS Thousands/µL 2 54 3 71 2 36 4 70 4 91     Results from last 7 days   Lab Units 04/30/21  0618 04/29/21  1945 04/27/21  0612 04/26/21  1727 04/25/21  1401   SODIUM mmol/L 144 139 144 143 140   POTASSIUM mmol/L 3 5 3 9 3 6 4 2 5 5*   CHLORIDE mmol/L 108 105 111* 108 113*   CO2 mmol/L 23 22 24 23 22   ANION GAP mmol/L 13 12 9 12 5   BUN mg/dL 34* 37* 19 24 32*   CREATININE mg/dL 2 72* 2 86* 1 98* 2 53* 2 59*   EGFR ml/min/1 73sq m 20 18 29 21 21   CALCIUM mg/dL 8 3 8 9 8 8 9 2 8 8     Results from last 7 days   Lab Units 04/29/21  1945 04/26/21  1727 04/25/21  1401   AST U/L 21 23 48*   ALT U/L 26 27 26   ALK PHOS U/L 129* 133* 127*   TOTAL PROTEIN g/dL 7 8 8 0 8 0   ALBUMIN g/dL 4 0 3 8 3 7   TOTAL BILIRUBIN mg/dL 0 22 0 23 0 39     Results from last 7 days   Lab Units 04/30/21  0922 04/30/21  0106 04/26/21  2140   POC GLUCOSE mg/dl 112 94 81     Results from last 7 days   Lab Units 04/30/21  0618 04/29/21  1945 04/27/21  0612 04/26/21  1727 04/25/21  1401   GLUCOSE RANDOM mg/dL 92 151* 76 113 69     Results from last 7 days   Lab Units 04/29/21  1945 04/26/21  1727   TROPONIN I ng/mL <0 02 <0 02     Results from last 7 days   Lab Units 04/26/21  1727   PROTIME seconds 13 7   INR  1 07   PTT seconds 40*     Results from last 7 days   Lab Units 04/27/21  0623 04/26/21  1727   PROCALCITONIN ng/ml <0 05 <0 05     Results from last 7 days   Lab Units 04/26/21  1727   LACTIC ACID mmol/L 1 0     Results from last 7 days   Lab Units 04/29/21  1945 04/26/21  1727 04/25/21  1401   LIPASE u/L 192 189 162     Results from last 7 days   Lab Units 04/29/21  2111 04/26/21  1859 04/25/21  1428   CLARITY UA  Slightly Cloudy Clear Cloudy   COLOR UA  Yellow Yellow Yellow   SPEC GRAV UA  >=1 030 1 025 1 021   PH UA  5 5 6 5 5 5   GLUCOSE UA mg/dl Negative Negative Negative   KETONES UA mg/dl Negative Negative Negative   BLOOD UA  Negative Trace* Trace*   PROTEIN UA mg/dl 100 (2+)* 100 (2+)* 100 (2+)*   NITRITE UA  Negative Negative Negative   BILIRUBIN UA  Negative Negative Negative   UROBILINOGEN UA E U /dl 0 2 0 2 1 0   LEUKOCYTES UA  Trace* Negative Moderate*   WBC UA /hpf 2-4 1-2* Innumerable*   RBC UA /hpf None Seen 0-1* 2-4   BACTERIA UA /hpf Occasional Occasional None Seen   EPITHELIAL CELLS WET PREP /hpf Occasional Occasional Moderate*     Results from last 7 days   Lab Units 04/26/21  1730 04/26/21  1727 04/25/21  1428 04/23/21  0957   BLOOD CULTURE  No Growth at 72 hrs  No Growth at 72 hrs   --   --    URINE CULTURE   --   --  No Growth <1000 cfu/mL 30,000-39,000 cfu/ml Alpha Hemolytic Streptococcus NOT Enterococcus*     4/29 EKG:  Normal sinus rhythm   Low voltage QRS   4/29 US RUQ:   1   No evidence of cholelithiasis, cholecystitis or biliary obstruction  2  Kevyn Goes increased echogenicity in the right renal cortex could represent medical renal disease   No hydronephrosis       4/30 US Kidney & Bladder - pending read     ED Treatment:   Medication Administration from 04/29/2021 1539 to 04/30/2021 0933       Date/Time Order Dose Route Action     04/29/2021 1953 HYDROmorphone (DILAUDID) injection 0 5 mg 0 5 mg Intravenous Given     04/29/2021 1954 ondansetron (ZOFRAN) injection 4 mg 4 mg Intravenous Given     04/29/2021 2035 HYDROmorphone (DILAUDID) injection 0 5 mg 0 5 mg Intravenous Given     04/29/2021 2311 lactated ringers infusion 100 mL/hr Intravenous New Bag     04/29/2021 2342 HYDROmorphone (DILAUDID) injection 0 5 mg 0 5 mg Intravenous Given     04/30/2021 0849 amLODIPine (NORVASC) tablet 5 mg 5 mg Oral Given     04/30/2021 0849 atorvastatin (LIPITOR) tablet 20 mg 20 mg Oral Given     04/30/2021 0901 buPROPion (WELLBUTRIN XL) 24 hr tablet 300 mg 300 mg Oral Given     04/30/2021 0108 pantoprazole (PROTONIX) injection 40 mg 40 mg Intravenous Given     04/30/2021 0108 ceftriaxone (ROCEPHIN) 1 g/50 mL in dextrose IVPB 1,000 mg Intravenous New Bag     04/30/2021 0849 saccharomyces boulardii (FLORASTOR) capsule 250 mg 250 mg Oral Given     04/30/2021 0107 QUEtiapine (SEROquel) tablet 150 mg 150 mg Oral Given     04/30/2021 0859 ondansetron (ZOFRAN) injection 4 mg 4 mg Intravenous Given     04/30/2021 0622 HYDROmorphone (DILAUDID) injection 0 2 mg 0 2 mg Intravenous Given     04/30/2021 0112 sodium chloride 0 9 % infusion 100 mL/hr Intravenous New Bag     04/30/2021 0848 oxyCODONE (ROXICODONE) immediate release tablet 10 mg 10 mg Oral Given        Past Medical History:   Diagnosis Date    ADHD     Anemia of chronic disease     Anxiety     Asthma     Asthma     Borderline personality disorder (HCC)     Cataplexy     Chronic abdominal pain     CKD (chronic kidney disease) stage 3, GFR 30-59 ml/min (HCC)     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[place] [address] )     Cushing syndrome ([place] [address] )     Diabetes mellitus ([place] [address] )     DVT (deep venous thrombosis) (HCC)     GERD (gastroesophageal reflux disease)     History of acute pancreatitis     felt secondary to Bactrim    History of transfusion     HTN (hypertension)     Hypertension     Liver disease     fatty liver    Microscopic polyangiitis (HCC)     Morbid obesity (HCC)     MPA (microscopic polyangiitis) (HCC)     Ovarian cyst     PTSD (post-traumatic stress disorder)     Renal disorder     Self-inflicted injury     self inflicted skin wounds    Wegener's granulomatosis with renal involvement ([place] [address] ) 2015     Present on Admission:   Bipolar 1 disorder ([place] [address] )   Type 2 diabetes mellitus with renal complication (HCC)   Attention deficit hyperactivity disorder (ADHD)   Chronic narcotic use   Nausea & vomiting   Diarrhea of presumed infectious origin      Admitting Diagnosis: Back pain [M54 9]  Age/Sex: 48 y o  female  Admission Orders:  Scheduled Medications:  amLODIPine, 5 mg, Oral, Daily  amphetamine-dextroamphetamine, 20 mg, Oral, BID before breakfast/lunch  atorvastatin, 20 mg, Oral, Daily  buPROPion, 300 mg, Oral, QAM  cefTRIAXone, 1,000 mg, Intravenous, Q24H  insulin glargine, 15 Units, Subcutaneous, QAM  insulin lispro, 1-5 Units, Subcutaneous, 4x Daily (AC & HS)  pantoprazole, 40 mg, Intravenous, Q24H PAULINE  QUEtiapine, 150 mg, Oral, HS  saccharomyces boulardii, 250 mg, Oral, BID    Continuous IV Infusions:  sodium chloride, 100 mL/hr, Intravenous, Continuous    PRN Meds:   Please see meds in ED  acetaminophen, 650 mg, Oral, Q6H PRN  albuterol, 2 puff, Inhalation, Q4H PRN  calcium carbonate, 1,000 mg, Oral, TID PRN  HYDROmorphone, 0 2 mg, Intravenous, Q4H PRN x  2  4/30  metoclopramide, 5 mg, Intravenous, Q6H PRN  ondansetron, 4 mg, Intravenous, Q6H PRN x 1 4/30  oxyCODONE, 10 mg, Oral, Q6H PRN  X 2 4/30  simethicone, 80 mg, Oral, 4x Daily PRN    Network Utilization Review Department  ATTENTION: Please call with any questions or concerns to [phone] and carefully listen to the prompts so that you are directed to the right person  All voicemails are confidential   Ilan Lockhart all requests for admission clinical reviews, approved or denied determinations and any other requests to dedicated fax number below belonging to the campus where the patient is receiving treatment   List of dedicated fax numbers for the Facilities:  [address] DENIALS (Administrative/Medical Necessity) [phone]   [address] (Maternity/NICU/Pediatrics) [phone]   [address] [place] [address] [address] [place] [address] [address] [facility] [facility] [phone]

## 2021-04-30 NOTE — H&P
[facility]  H&P- Robert Ventura 1970, 48 y o  female MRN: [id number]  Unit/Bed#: ED 16 Encounter: [id number]  Primary Care Provider: Nicki Day PA-C   Date and time admitted to hospital: 4/29/2021  6:30 PM    * Acute kidney injury [facility])  Assessment & Plan  · Creatinine 2 8, baseline 1 5-2 1  · Reports diarrhea and emesis, unable to tolerate anything orally  · IV hydration  · Avoid nephrotoxins  · Monitor lytes  · Monitor BMP    Nausea & vomiting  Assessment & Plan  · IV hydration  · P r n  antiemetics   · Cleared liquid diet, advance as tolerated  · Monitor lytes    Pyelonephritis  Assessment & Plan  · Admitted 4/26 for pyelonephritis and discharged the following day with Rx for Macrobid  Unable to tolerate p o , will re-start IV Rocephin  · Afebrile, no leukocytosis  · Urine culture on 03/23 showed alpha hemolytic Streptococcus; possibly contaminant  · Blood culture no growth at 72 hours    Intractable abdominal pain  Assessment & Plan  · Was just admitted 4/26-4/27 and treated for pyelonephritis  This is her 4th presentation to ED with same complaint of flank and abdominal pain  · RUQ US: No evidence of cholelithiasis, cholecystitis or biliary obstruction  2   Mildly increased echogenicity in the right renal cortex could represent medical renal disease  No hydronephrosis  · Apr 23: CT scan A/P:  Negative for acute pathology to account for patient's symptoms  · Pain control  · Add IV PPI daily  · Clear liquid diet, advance as tolerated    Attention deficit hyperactivity disorder (ADHD)  Assessment & Plan  · Continue Adderall Er 20mg BID  Verified on PDMP    Chronic narcotic use  Assessment & Plan  · On Oxycodone-Acetaminophen 10-325mg up to 4x daily    Verified on PDMP    Diarrhea of presumed infectious origin  Assessment & Plan  · Reports poor appetite and persistent diarrhea; h/o irritable bowel syndrome  · Will obtain stool for C diff and enteric bacterial panel PCR    · Add probiotic    Bipolar 1 disorder (HCC)  Assessment & Plan  · Continue Seroquel and Wellbutrin    Type 2 diabetes mellitus with renal complication [facility])  Assessment & Plan  Lab Results   Component Value Date    HGBA1C 6 0 02/15/2021     · Will continue home insulin at decreased dose during acute illness  · Add sliding scale insulin  · ADA diet  No results for input(s): POCGLU in the last 72 hours  Blood Sugar Average: Last 72 hrs:      VTE Prophylaxis: Enoxaparin (Lovenox)  / reason for no mechanical VTE prophylaxis ac   Code Status: FC  POLST: POLST is not applicable to this patient  Discussion with family:     Anticipated Length of Stay:  Patient will be admitted on an Observation basis with an anticipated length of stay of  < 2 midnights  Justification for Hospital Stay:  Intractable abdominal and flank pain with nausea, vomiting, diarrhea and MARY    Total Time for Visit, including Counseling / Coordination of Care: 60 minutes  Greater than 50% of this total time spent on direct patient counseling and coordination of care  Chief Complaint:   Abdominal pain, nausea, vomiting and diarrhea    History of Present Illness:    Tasneem Do is a 48 y o  female who presents with c/o left flank pain, abdominal pain, nausea, emesis and diarrhea  Reports symptoms present since last Wednesday, has been seen in ED 4 times with same complaint, was admitted 4/26-4/27 and treated for MARY and pyelonephritis, discharged home with script for Macrobid  Patient reports she is unable to tolerate anything orally, reports watery diarrhea  H/o IBS, states symptoms are different than her usual IBS flare  Reports dark urine output  Denies fever, dysuria, hematuria, frequency or urgency  Denies history of nephrolithiasis  Review of Systems:    Review of Systems   Constitutional: Positive for appetite change  Negative for fever  Unable to tolerate food or fluids   HENT: Negative  Eyes: Negative  Gastrointestinal: Positive for abdominal pain, diarrhea, nausea and vomiting  Negative for constipation  Epigastric pain, watery diarrhea   Genitourinary: Positive for flank pain  Negative for decreased urine volume, difficulty urinating, hematuria and urgency  Left flank pain; dark urine   Musculoskeletal:        " L Kidney pain"   Skin: Negative  Neurological: Negative  Psychiatric/Behavioral: Negative  Past Medical and Surgical History:     Past Medical History:   Diagnosis Date    ADHD     Anemia of chronic disease     Anxiety     Asthma     Asthma     Borderline personality disorder ([place] [id number] )     Cataplexy     Chronic abdominal pain     CKD (chronic kidney disease) stage 3, GFR 30-59 ml/min (HCC)     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[place] [address] )     Cushing syndrome ([place] [address] )     Diabetes mellitus ([place] [address] )     DVT (deep venous thrombosis) (HCC)     GERD (gastroesophageal reflux disease)     History of acute pancreatitis     felt secondary to Bactrim    History of transfusion     HTN (hypertension)     Hypertension     Liver disease     fatty liver    Microscopic polyangiitis (HCC)     Morbid obesity (HCC)     MPA (microscopic polyangiitis) (HCC)     Ovarian cyst     PTSD (post-traumatic stress disorder)     Renal disorder     Self-inflicted injury     self inflicted skin wounds    Wegener's granulomatosis with renal involvement ([place] [address] ) 2015       Past Surgical History:   Procedure Laterality Date    COLONOSCOPY      ESOPHAGOGASTRODUODENOSCOPY  09/11/2015    mild antral gastritis    GASTRIC STIMULATOR IMPLANT SURGERY  06/25/2020    FL COLONOSCOPY FLX DX W/COLLJ SPEC WHEN PFRMD N/A 12/14/2018    Procedure: COLONOSCOPY with polypectomy;  Surgeon: Khloe Lyle MD;  Location: [facility];   Service: Gastroenterology    FL ESOPHAGOGASTRODUODENOSCOPY TRANSORAL DIAGNOSTIC N/A 12/14/2018    Procedure: ESOPHAGOGASTRODUODENOSCOPY (EGD) with biopsy;  Surgeon: Khloe Lyle MD;  Location: [facility];  Service: Gastroenterology    RELEASE SCAR CONTRACTURE / GRAFT REPAIRS OF HAND Bilateral     UPPER GASTROINTESTINAL ENDOSCOPY  12/26/2019       Meds/Allergies:    Prior to Admission medications    Medication Sig Start Date End Date Taking?  Authorizing Provider   albuterol (PROVENTIL HFA,VENTOLIN HFA) 90 mcg/act inhaler Inhale 2 puffs every 6 (six) hours as needed for wheezing or shortness of breath 3/17/21  Yes Katarzyna Thrasher PA-C   Alcohol Swabs (ALCOHOL PADS) 70 % PADS by Does not apply route 4 (four) times a day 6/7/19  Yes Mackenzie Martinez PA-C   amLODIPine (NORVASC) 5 mg tablet TAKE ONE TABLET BY MOUTH ONCE DAILY 12/29/20  Yes Linh Gregory DO   amphetamine-dextroamphetamine (ADDERALL XR) 20 MG 24 hr capsule Take 1 capsule (20 mg total) by mouth 2 (two) times a dayMax Daily Amount: 40 mg 4/19/21  Yes Katarzyna Thrasher PA-C   atorvastatin (LIPITOR) 20 mg tablet Take 1 tablet (20 mg total) by mouth daily 3/3/21  Yes Katarzyna Thrasher PA-C   Blood Glucose Monitoring Suppl (FREESTYLE LITE) DEIRDRE by Does not apply route daily 5/21/19  Yes Mackenzie Martinez PA-C   buPROPion (WELLBUTRIN XL) 300 mg 24 hr tablet Take 300 mg by mouth every morning  12/4/20  Yes Historical Provider, MD   dexlansoprazole (Dexilant) 60 MG capsule Take 1 capsule (60 mg total) by mouth daily 2/18/21  Yes Anamaria May PA-C   docusate sodium (COLACE) 100 mg capsule Take 1 capsule (100 mg total) by mouth 2 (two) times a day 11/19/20  Yes Katarzyna Thrasher PA-C   Easy Comfort Lancets MISC USE TO TEST THE BLOOD SUGAR THREE TIMES A DAY 3/1/21  Yes Katarzyna Thrasher PA-C   FREESTYLE LITE test strip USE TO TEST THE BLOOD SUGAR THREE TIMES A DAY 4/1/21  Yes Katarzyna Thrasher PA-C   hydrocortisone 1 % cream Apply topically 4 (four) times a day as needed for irritation 4/13/21  Yes Katarzyan A Durako, PA-C   insulin aspart (NovoLOG FlexPen) 100 UNIT/ML injection pen Inject 6 Units under the skin 3 (three) times a day with meals 9/24/20  Yes Katarzyna Thrasher PA-C   Insulin Pen Needle (PEN NEEDLES) 31G X 8 MM MISC Inject 1 Stick under the skin 4 (four) times a day (with meals and at bedtime) 3/28/18  Yes Mariela Martinez PA-C   lidocaine (XYLOCAINE) 5 % ointment APPLY TOPICALLY 2GM TWICE A DAY TO AFFECTED AREAS AS NEEDED FOR MILD PAIN 12/10/20  Yes Katarzyna Thrasher PA-C   linaCLOtide (Linzess) 72 MCG CAPS Take 72 mcg by mouth daily 2/18/21  Yes Santhosh Wong PA-C   Nutritional Supplements (Ensure Original) LIQD Take 1 Bottle by mouth 2 (two) times a day 10/13/20  Yes Katarzyna Thrasher PA-C   ondansetron (ZOFRAN) 4 mg tablet Take 1 tablet (4 mg total) by mouth every 8 (eight) hours as needed for nausea or vomiting 9/24/20  Yes Katarzyna Thrasher PA-C   oxyCODONE-acetaminophen (PERCOCET)  mg per tablet Take 1 tablet by mouth every 6 (six) hours as needed for moderate painMax Daily Amount: 4 tablets 4/10/21  Yes Katarzyna Thrasher PA-C   pantoprazole (PROTONIX) 40 mg tablet Take 1 tablet (40 mg total) by mouth 2 (two) times a day before meals 8/26/20  Yes Jane Soria PA-C   Probiotic Product (PROBIOTIC-10 PO) Take 1 tablet by mouth daily   Yes Historical Provider, MD   QUEtiapine (SEROquel) 200 mg tablet Take 150 mg by mouth daily at bedtime    Yes Historical Provider, MD   scopolamine (TRANSDERM-SCOP) 1 5 mg/3 days TD 72 hr patch Place 1 patch on the skin every third day 4/27/21  Yes HOLLIS Guzman SoloStar 300 units/mL CONCETRATED U-300 injection pen (2-unit dial) INJECT 22 UNITS UNDER THE SKIN DAILY 2/22/21  Yes Katarzyna Thrasher PA-C   albuterol (2 5 mg/3 mL) 0 083 % nebulizer solution Take 1 vial (2 5 mg total) by nebulization every 6 (six) hours as needed for wheezing or shortness of breath  Patient not taking: Reported on 4/29/2021 1/11/21   Katarzyna Thrasher PA-C   conjugated estrogens (Premarin) vaginal cream INSERT 1 GRAM PER VAGINA DAILY AT BEDTIME  FOR TWO WEEKS AND THEN CHANGE TO 1 GRAM TWICE WEEKLY  Patient not taking: Reported on 4/26/2021 4/6/21   Keiry Ortega,    cycloSPORINE (RESTASIS) 0 05 % ophthalmic emulsion Administer 1 drop to both eyes 2 (two) times a day    Historical Provider, MD   dicyclomine (BENTYL) 10 mg capsule  11/27/20   Historical Provider, MD   nitrofurantoin (MACROBID) 100 mg capsule Take 1 capsule (100 mg total) by mouth 2 (two) times a day  Patient not taking: Reported on 4/29/2021 4/27/21   Dyan Dyer PA-C   polyethylene glycol (GLYCOLAX) 17 GM/SCOOP powder DISSOLVE 17 GRAMS(ONE CAPFUL) IN 8 OUNCE OF WATER/JUICE & TAKE BY MOUTH DAILY AS DIRECTED *MAXIMUM ONE DOSE DAILY*  Patient not taking: Reported on 4/26/2021 1/31/21   Katarzyna Thrasher PA-C   scopolamine (TRANSDERM-SCOP) 1 5 mg/3 days TD 72 hr patch Place 1 patch on the skin every third day  Patient not taking: Reported on 4/26/2021 3/17/20   Kim Trujillo PA-C   TiZANidine (ZANAFLEX) 4 MG capsule TAKE ONE CAPSULE BY MOUTH THREE TIMES A DAY  Patient not taking: Reported on 4/29/2021 4/12/21   Katarzyna Thrasher PA-C     I have reviewed home medications using allscripts  Allergies:    Allergies   Allergen Reactions    Prozac [Fluoxetine Hcl]      SI    Bactrim [Sulfamethoxazole-Trimethoprim]      Pt "They think that is what cause the pancreatitis"     Lamictal [Lamotrigine] GI Intolerance    Lithium Other (See Comments)    Haldol [Haloperidol] Other (See Comments)     "I don't like it"    Ibuprofen     Lexapro [Escitalopram Oxalate] Rash    Navane [Thiothixene]      SI    Other      "novaine?" antipsychotic       Social History:     Marital Status: Single   Occupation: disabled  Patient Pre-hospital Living Situation:  Resides at home alone  Patient Pre-hospital Level of Mobility: powerchair  Patient Pre-hospital Diet Restrictions:   Substance Use History:   Social History     Substance and Sexual Activity   Alcohol Use Never    Frequency: Never    Binge frequency: Never     Social History     Tobacco Use   Smoking Status Former Smoker    Quit date: 2011    Years since quitting: 10 3   Smokeless Tobacco Never Used   Tobacco Comment    marijuana     Social History     Substance and Sexual Activity   Drug Use Yes    Types: Marijuana    Comment: marijuana daily       Family History:    Family History   Problem Relation Age of Onset    No Known Problems Mother     No Known Problems Father     Colon cancer Neg Hx     Drug abuse Neg Hx         mother father    Mental illness Neg Hx         disorder, mother father    Cancer Neg Hx     Breast cancer Neg Hx        Physical Exam:     Vitals:   Blood Pressure: 129/74 (04/30/21 0018)  Pulse: 74 (04/30/21 0018)  Temperature: 98 4 °F (36 9 °C) (04/30/21 0018)  Temp Source: Oral (04/30/21 0018)  Respirations: 16 (04/30/21 0018)  Height: 5' 2" (157 5 cm) (04/30/21 0018)  Weight - Scale: 73 1 kg (161 lb 2 5 oz) (04/30/21 0018)  SpO2: 98 % (04/30/21 0018)    Physical Exam  Constitutional:       General: She is not in acute distress  Appearance: Normal appearance  She is normal weight  She is not ill-appearing, toxic-appearing or diaphoretic  HENT:      Head: Normocephalic and atraumatic  Nose: No congestion or rhinorrhea  Mouth/Throat:      Mouth: Mucous membranes are dry  Eyes:      General: No scleral icterus  Right eye: No discharge  Left eye: No discharge  Cardiovascular:      Rate and Rhythm: Normal rate and regular rhythm  Heart sounds: Normal heart sounds  Pulmonary:      Effort: Pulmonary effort is normal       Breath sounds: Normal breath sounds  Abdominal:      General: Bowel sounds are normal       Tenderness: There is abdominal tenderness  There is guarding  Comments: Tenderness over epigastric region and both upper quadrants, no tenderness on palpation of lower quadrants   Genitourinary:     Comments: Left CVA tenderness  Musculoskeletal:      Right lower leg: No edema  Left lower leg: No edema     Skin:     General: Skin is warm and dry       Coloration: Skin is not jaundiced or pale  Neurological:      General: No focal deficit present  Mental Status: She is alert and oriented to person, place, and time  Psychiatric:         Mood and Affect: Mood normal          Behavior: Behavior normal          Thought Content: Thought content normal          Judgment: Judgment normal      Additional Data:     Lab Results: I have personally reviewed pertinent reports  Results from last 7 days   Lab Units 04/29/21 1945   WBC Thousand/uL 6 32   HEMOGLOBIN g/dL 12 7   HEMATOCRIT % 39 0   PLATELETS Thousands/uL 279   NEUTROS PCT % 58   LYMPHS PCT % 30   MONOS PCT % 10   EOS PCT % 1     Results from last 7 days   Lab Units 04/29/21  1945   SODIUM mmol/L 139   POTASSIUM mmol/L 3 9   CHLORIDE mmol/L 105   CO2 mmol/L 22   BUN mg/dL 37*   CREATININE mg/dL 2 86*   ANION GAP mmol/L 12   CALCIUM mg/dL 8 9   ALBUMIN g/dL 4 0   TOTAL BILIRUBIN mg/dL 0 22   ALK PHOS U/L 129*   ALT U/L 26   AST U/L 21   GLUCOSE RANDOM mg/dL 151*     Results from last 7 days   Lab Units 04/26/21  1727   INR  1 07     Results from last 7 days   Lab Units 04/26/21  2140   POC GLUCOSE mg/dl 81         Results from last 7 days   Lab Units 04/27/21  0623 04/26/21  1727   LACTIC ACID mmol/L  --  1 0   PROCALCITONIN ng/ml <0 05 <0 05       Imaging: I have personally reviewed pertinent reports  US right upper quadrant   Final Result by Jose Ramon Villegas MD (04/29 2223)         1  No evidence of cholelithiasis, cholecystitis or biliary obstruction  2   Mildly increased echogenicity in the right renal cortex could represent medical renal disease  No hydronephrosis  Workstation performed: [id number]             EKG, Pathology, and Other Studies Reviewed on Admission:   · EKG:  NSR 61, ct us    Allscripts / Epic Records Reviewed: Yes     ** Please Note: This note has been constructed using a voice recognition system   **

## 2021-04-30 NOTE — ED PROVIDER NOTES
History  Chief Complaint   Patient presents with    Back Pain     Pt reports seen at ED 3 times since friday  Pt reports stage 4 kidney problems and pt reports increased pain, back pain, diarrhea  Patient presents ER for evaluation abdominal pain/back pain, vomiting, diarrhea  Patient states she was recently discharge from the hospital after an admission due to pyelonephritis  States that she was sent home on Macrobid which she has been taking since she left  Patient states that the pain has worsened since her last day  States that she has been vomiting the past few days and has not been able to keep anything down  States that she has also had persistent diarrhea for the last few days as well  States that this started prior to her last admission and prior to antibiotics being started  Patient does note that she has history of stage 4 chronic kidney disease  Patient did note occasional palpitations however denies any at this time  Patient denies any fevers, cough chest pain, shortness of breath, dizziness, syncope, or any other concerning symptoms  Prior to Admission Medications   Prescriptions Last Dose Informant Patient Reported? Taking?    Alcohol Swabs (ALCOHOL PADS) 70 % PADS 4/29/2021 at Unknown time Self No Yes   Sig: by Does not apply route 4 (four) times a day   Blood Glucose Monitoring Suppl (FREESTYLE LITE) DEIRDRE 4/29/2021 at Unknown time Self No Yes   Sig: by Does not apply route daily   Easy Comfort Lancets MISC 4/29/2021 at Unknown time  No Yes   Sig: USE TO TEST THE BLOOD SUGAR THREE TIMES A DAY   FREESTYLE LITE test strip 4/29/2021 at Unknown time  No Yes   Sig: USE TO TEST THE BLOOD SUGAR THREE TIMES A DAY   Insulin Pen Needle (PEN NEEDLES) 31G X 8 MM MISC 4/29/2021 at Unknown time Self No Yes   Sig: Inject 1 Stick under the skin 4 (four) times a day (with meals and at bedtime)   Nutritional Supplements (Ensure Original) LIQD 4/29/2021 at Unknown time  No Yes   Sig: Take 1 Bottle by mouth 2 (two) times a day   Probiotic Product (PROBIOTIC-10 PO) 4/28/2021 at Unknown time Self Yes Yes   Sig: Take 1 tablet by mouth daily   QUEtiapine (SEROquel) 200 mg tablet 4/29/2021 at Unknown time  Yes Yes   Sig: Take 150 mg by mouth daily at bedtime    TiZANidine (ZANAFLEX) 4 MG capsule Not Taking at Unknown time  No No   Sig: TAKE ONE CAPSULE BY MOUTH THREE TIMES A DAY   Patient not taking: Reported on 4/29/2021   Toujeo Max SoloStar 300 units/mL CONCETRATED U-300 injection pen (2-unit dial) 4/29/2021 at Unknown time  No Yes   Sig: INJECT 22 UNITS UNDER THE SKIN DAILY   albuterol (2 5 mg/3 mL) 0 083 % nebulizer solution Not Taking at Unknown time  No No   Sig: Take 1 vial (2 5 mg total) by nebulization every 6 (six) hours as needed for wheezing or shortness of breath   Patient not taking: Reported on 4/29/2021   albuterol (PROVENTIL HFA,VENTOLIN HFA) 90 mcg/act inhaler Past Month at Unknown time  No Yes   Sig: Inhale 2 puffs every 6 (six) hours as needed for wheezing or shortness of breath   amLODIPine (NORVASC) 5 mg tablet 4/28/2021 at Unknown time  No Yes   Sig: TAKE ONE TABLET BY MOUTH ONCE DAILY   amphetamine-dextroamphetamine (ADDERALL XR) 20 MG 24 hr capsule 4/29/2021 at Unknown time  No Yes   Sig: Take 1 capsule (20 mg total) by mouth 2 (two) times a dayMax Daily Amount: 40 mg   atorvastatin (LIPITOR) 20 mg tablet 4/29/2021 at Unknown time  No Yes   Sig: Take 1 tablet (20 mg total) by mouth daily   buPROPion (WELLBUTRIN XL) 300 mg 24 hr tablet 4/29/2021 at Unknown time  Yes Yes   Sig: Take 300 mg by mouth every morning    conjugated estrogens (Premarin) vaginal cream Not Taking at Unknown time  No No   Sig: INSERT 1 GRAM PER VAGINA DAILY AT BEDTIME  FOR TWO WEEKS AND THEN CHANGE TO 1 GRAM TWICE WEEKLY   Patient not taking: Reported on 4/26/2021   cycloSPORINE (RESTASIS) 0 05 % ophthalmic emulsion Not Taking at Unknown time Self Yes No   Sig: Administer 1 drop to both eyes 2 (two) times a day dexlansoprazole (Dexilant) 60 MG capsule 4/29/2021 at Unknown time  No Yes   Sig: Take 1 capsule (60 mg total) by mouth daily   dicyclomine (BENTYL) 10 mg capsule Not Taking at Unknown time  Yes No   docusate sodium (COLACE) 100 mg capsule 4/29/2021 at Unknown time  No Yes   Sig: Take 1 capsule (100 mg total) by mouth 2 (two) times a day   hydrocortisone 1 % cream Past Week at Unknown time  No Yes   Sig: Apply topically 4 (four) times a day as needed for irritation   insulin aspart (NovoLOG FlexPen) 100 UNIT/ML injection pen Past Week at Unknown time  No Yes   Sig: Inject 6 Units under the skin 3 (three) times a day with meals   lidocaine (XYLOCAINE) 5 % ointment 4/28/2021 at Unknown time  No Yes   Sig: APPLY TOPICALLY 2GM TWICE A DAY TO AFFECTED AREAS AS NEEDED FOR MILD PAIN   linaCLOtide (Linzess) 72 MCG CAPS 4/29/2021 at Unknown time  No Yes   Sig: Take 72 mcg by mouth daily   nitrofurantoin (MACROBID) 100 mg capsule Unknown at Unknown time  No No   Sig: Take 1 capsule (100 mg total) by mouth 2 (two) times a day   Patient not taking: Reported on 4/29/2021   ondansetron (ZOFRAN) 4 mg tablet 4/29/2021 at Unknown time  No Yes   Sig: Take 1 tablet (4 mg total) by mouth every 8 (eight) hours as needed for nausea or vomiting   oxyCODONE-acetaminophen (PERCOCET)  mg per tablet 4/29/2021 at Unknown time  No Yes   Sig: Take 1 tablet by mouth every 6 (six) hours as needed for moderate painMax Daily Amount: 4 tablets   pantoprazole (PROTONIX) 40 mg tablet 4/29/2021 at Unknown time  No Yes   Sig: Take 1 tablet (40 mg total) by mouth 2 (two) times a day before meals   polyethylene glycol (GLYCOLAX) 17 GM/SCOOP powder Not Taking at Unknown time  No No   Sig: DISSOLVE 17 GRAMS(ONE CAPFUL) IN 8 OUNCE OF WATER/JUICE & TAKE BY MOUTH DAILY AS DIRECTED *MAXIMUM ONE DOSE DAILY*   Patient not taking: Reported on 4/26/2021   scopolamine (TRANSDERM-SCOP) 1 5 mg/3 days TD 72 hr patch Not Taking at Unknown time  No No   Sig: Place 1 patch on the skin every third day   Patient not taking: Reported on 4/26/2021   scopolamine (TRANSDERM-SCOP) 1 5 mg/3 days TD 72 hr patch 4/29/2021 at Unknown time  No Yes   Sig: Place 1 patch on the skin every third day      Facility-Administered Medications: None       Past Medical History:   Diagnosis Date    ADHD     Anemia of chronic disease     Anxiety     Asthma     Asthma     Borderline personality disorder ([place] [address] )     Cataplexy     Chronic abdominal pain     CKD (chronic kidney disease) stage 3, GFR 30-59 ml/min (HCC)     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[place] [address] )     Cushing syndrome ([place] [address] )     Diabetes mellitus ([place] [address] )     DVT (deep venous thrombosis) (HCC)     GERD (gastroesophageal reflux disease)     History of acute pancreatitis     felt secondary to Bactrim    History of transfusion     HTN (hypertension)     Hypertension     Liver disease     fatty liver    Microscopic polyangiitis (HCC)     Morbid obesity (HCC)     MPA (microscopic polyangiitis) (HCC)     Ovarian cyst     PTSD (post-traumatic stress disorder)     Renal disorder     Self-inflicted injury     self inflicted skin wounds    Wegener's granulomatosis with renal involvement ([place] [address] ) 2015       Past Surgical History:   Procedure Laterality Date    COLONOSCOPY      ESOPHAGOGASTRODUODENOSCOPY  09/11/2015    mild antral gastritis    GASTRIC STIMULATOR IMPLANT SURGERY  06/25/2020    OK COLONOSCOPY FLX DX W/COLLJ SPEC WHEN PFRMD N/A 12/14/2018    Procedure: COLONOSCOPY with polypectomy;  Surgeon: Nilton Peraza MD;  Location: [facility]; Service: Gastroenterology    OK ESOPHAGOGASTRODUODENOSCOPY TRANSORAL DIAGNOSTIC N/A 12/14/2018    Procedure: ESOPHAGOGASTRODUODENOSCOPY (EGD) with biopsy;  Surgeon: Nilton Peraza MD;  Location: [facility];   Service: Gastroenterology    RELEASE SCAR CONTRACTURE / GRAFT REPAIRS OF HAND Bilateral     UPPER GASTROINTESTINAL ENDOSCOPY  12/26/2019       Family History   Problem Relation Age of Onset    No Known Problems Mother     No Known Problems Father     Colon cancer Neg Hx     Drug abuse Neg Hx         mother father    Mental illness Neg Hx         disorder, mother father    Cancer Neg Hx     Breast cancer Neg Hx      I have reviewed and agree with the history as documented  E-Cigarette/Vaping    E-Cigarette Use Never User      E-Cigarette/Vaping Substances    Nicotine No     THC No     CBD No      Social History     Tobacco Use    Smoking status: Former Smoker     Quit date: 2011     Years since quitting: 10 3    Smokeless tobacco: Never Used    Tobacco comment: marijuana   Substance Use Topics    Alcohol use: Never     Frequency: Never     Binge frequency: Never    Drug use: Yes     Types: Marijuana     Comment: marijuana daily       Review of Systems   Constitutional: Negative for fever  HENT: Negative for congestion, rhinorrhea and sore throat  Respiratory: Negative for shortness of breath  Cardiovascular: Positive for palpitations  Negative for chest pain  Gastrointestinal: Positive for abdominal pain, diarrhea, nausea and vomiting  Genitourinary: Positive for flank pain  Negative for dysuria  Musculoskeletal: Negative for back pain and neck pain  Skin: Negative for rash  Neurological: Negative for weakness, numbness and headaches  All other systems reviewed and are negative  Physical Exam  Physical Exam  Constitutional:       Appearance: She is well-developed  HENT:      Head: Normocephalic and atraumatic  Nose: Nose normal    Eyes:      Conjunctiva/sclera: Conjunctivae normal    Neck:      Musculoskeletal: Normal range of motion  Cardiovascular:      Rate and Rhythm: Normal rate  Pulmonary:      Effort: Pulmonary effort is normal    Abdominal:      Palpations: Abdomen is soft  Tenderness: There is abdominal tenderness  There is no guarding  Comments: Mild tenderness to palpation of right upper quadrant and epigastric area     Musculoskeletal: Normal range of motion  Skin:     General: Skin is warm  Capillary Refill: Capillary refill takes less than 2 seconds  Neurological:      Mental Status: She is alert and oriented to person, place, and time           Vital Signs  ED Triage Vitals [04/29/21 1735]   Temperature Pulse Respirations Blood Pressure SpO2   98 4 °F (36 9 °C) 90 18 124/82 98 %      Temp Source Heart Rate Source Patient Position - Orthostatic VS BP Location FiO2 (%)   Oral Monitor Sitting Right arm --      Pain Score       8           Vitals:    04/29/21 2159 04/29/21 2200 04/29/21 2300 04/29/21 2314   BP: 111/66 111/66 120/81 124/73   Pulse: 65 64 60 67   Patient Position - Orthostatic VS: Lying Lying Lying Lying         Visual Acuity      ED Medications  Medications   lactated ringers infusion (100 mL/hr Intravenous New Bag 4/29/21 2311)   HYDROmorphone (DILAUDID) injection 0 5 mg (0 5 mg Intravenous Given 4/29/21 1953)   ondansetron (ZOFRAN) injection 4 mg (4 mg Intravenous Given 4/29/21 1954)   HYDROmorphone (DILAUDID) injection 0 5 mg (0 5 mg Intravenous Given 4/29/21 2035)   HYDROmorphone (DILAUDID) injection 0 5 mg (0 5 mg Intravenous Given 4/29/21 2342)       Diagnostic Studies  Results Reviewed     Procedure Component Value Units Date/Time    Urine Microscopic [[id number]]  (Normal) Collected: 04/29/21 2111    Lab Status: Final result Specimen: Urine, Clean Catch Updated: 04/29/21 2135     RBC, UA None Seen /hpf      WBC, UA 2-4 /hpf      Epithelial Cells Occasional /hpf      Bacteria, UA Occasional /hpf     UA w Reflex to Microscopic w Reflex to Culture [[id number]]  (Abnormal) Collected: 04/29/21 2111    Lab Status: Final result Specimen: Urine, Clean Catch Updated: 04/29/21 2120     Color, UA Yellow     Clarity, UA Slightly Cloudy     Specific Gravity, UA >=1 030     pH, UA 5 5     Leukocytes, UA Trace     Nitrite, UA Negative     Protein,  (2+) mg/dl      Glucose, UA Negative mg/dl      Ketones, UA Negative mg/dl Urobilinogen, UA 0 2 E U /dl      Bilirubin, UA Negative     Blood, UA Negative    Novel Coronavirus (Covid-19),PCR [facility] - 2 Hour Stat [[id number]]  (Normal) Collected: 04/29/21 1945    Lab Status: Final result Specimen: Nares from Nasopharyngeal Swab Updated: 04/29/21 2048     SARS-CoV-2 Negative    Narrative: The specimen collection materials, transport medium, and/or testing methodology utilized in the production of these test results have been proven to be reliable in a limited validation with an abbreviated program under the Emergency Utilization Authorization provided by the FDA  Testing reported as "Presumptive positive" will be confirmed with secondary testing to ensure result accuracy  Clinical caution and judgement should be used with the interpretation of these results with consideration of the clinical impression and other laboratory testing  Testing reported as "Positive" or "Negative" has been proven to be accurate according to standard laboratory validation requirements  All testing is performed with control materials showing appropriate reactivity at standard intervals        Lipase [[id number]]  (Normal) Collected: 04/29/21 1945    Lab Status: Final result Specimen: Blood from Arm, Left Updated: 04/29/21 2033     Lipase 192 u/L     Comprehensive metabolic panel [[id number]]  (Abnormal) Collected: 04/29/21 1945    Lab Status: Final result Specimen: Blood from Arm, Left Updated: 04/29/21 2033     Sodium 139 mmol/L      Potassium 3 9 mmol/L      Chloride 105 mmol/L      CO2 22 mmol/L      ANION GAP 12 mmol/L      BUN 37 mg/dL      Creatinine 2 86 mg/dL      Glucose 151 mg/dL      Calcium 8 9 mg/dL      AST 21 U/L      ALT 26 U/L      Alkaline Phosphatase 129 U/L      Total Protein 7 8 g/dL      Albumin 4 0 g/dL      Total Bilirubin 0 22 mg/dL      eGFR 18 ml/min/1 73sq m     Narrative:      [place] guidelines for Chronic Kidney Disease (CKD):     Stage 1 with normal or high GFR (GFR > 90 mL/min/1 73 square meters)    Stage 2 Mild CKD (GFR = 60-89 mL/min/1 73 square meters)    Stage 3A Moderate CKD (GFR = 45-59 mL/min/1 73 square meters)    Stage 3B Moderate CKD (GFR = 30-44 mL/min/1 73 square meters)    Stage 4 Severe CKD (GFR = 15-29 mL/min/1 73 square meters)    Stage 5 End Stage CKD (GFR <15 mL/min/1 73 square meters)  Note: GFR calculation is accurate only with a steady state creatinine    Troponin I [[id number]]  (Normal) Collected: 04/29/21 1945    Lab Status: Final result Specimen: Blood from Arm, Left Updated: 04/29/21 2019     Troponin I <0 02 ng/mL     CBC and differential [[id number]] Collected: 04/29/21 1945    Lab Status: Final result Specimen: Blood from Arm, Left Updated: 04/29/21 1954     WBC 6 32 Thousand/uL      RBC 4 14 Million/uL      Hemoglobin 12 7 g/dL      Hematocrit 39 0 %      MCV 94 fL      MCH 30 7 pg      MCHC 32 6 g/dL      RDW 13 2 %      MPV 10 1 fL      Platelets 846 Thousands/uL      nRBC 0 /100 WBCs      Neutrophils Relative 58 %      Immat GRANS % 1 %      Lymphocytes Relative 30 %      Monocytes Relative 10 %      Eosinophils Relative 1 %      Basophils Relative 0 %      Neutrophils Absolute 3 71 Thousands/µL      Immature Grans Absolute 0 03 Thousand/uL      Lymphocytes Absolute 1 87 Thousands/µL      Monocytes Absolute 0 65 Thousand/µL      Eosinophils Absolute 0 04 Thousand/µL      Basophils Absolute 0 02 Thousands/µL     Clostridium difficile toxin by PCR with EIA [[id number]]     Lab Status: No result Specimen: Stool                  US right upper quadrant   Final Result by Teressa Meeks MD (04/29 2223)         1  No evidence of cholelithiasis, cholecystitis or biliary obstruction  2   Mildly increased echogenicity in the right renal cortex could represent medical renal disease  No hydronephrosis        Workstation performed: [id number]                    Procedures  Procedures         ED Course  ED Course as of Apr 29 2349 Thu Apr 29, 2021 2124 Blood Pressure: 124/82   2124 Temperature: 98 4 °F (36 9 °C)   2124 Pulse: 90   2124 Respirations: 18   2124 SpO2: 98 %   2124 SARS-COV-2: Negative   2230 IMPRESSION:        1  No evidence of cholelithiasis, cholecystitis or biliary obstruction  2   Mildly increased echogenicity in the right renal cortex could represent medical renal disease  No hydronephrosis  US right upper quadrant   2231 Creatinine(!): 2 86                             SBIRT 22yo+      Most Recent Value   SBIRT (23 yo +)   In order to provide better care to our patients, we are screening all of our patients for alcohol and drug use  Would it be okay to ask you these screening questions? No Filed at: 04/29/2021 1902                    MDM     Patient with a persistent abdominal pain despite multiple medications in the ER  Acute on chronic kidney injury noted  Patient given fluids in the ER  Will admit for pain control and repeat labs to assess kidney function  Patient stable throughout ER stay  Disposition  Final diagnoses:   Acute-on-chronic kidney injury ([address] )   Intractable abdominal pain   Diarrhea   Vomiting     Time reflects when diagnosis was documented in both MDM as applicable and the Disposition within this note     Time User Action Codes Description Comment    4/29/2021 11:45 PM [place] Stable Add [N17 9,  N18 9] Acute-on-chronic kidney injury ([address] )     4/29/2021 11:45 PM [place] Stable Add [R10 9] Intractable abdominal pain     4/29/2021 11:45 PM [place] Stable Add [R19 7] Diarrhea     4/29/2021 11:45 PM [place] Stable Add [R11 10] Vomiting       ED Disposition     ED Disposition Condition Date/Time Comment    Admit Stable Thu Apr 29, 2021 11:44 PM Case was discussed with HAJA and the patient's admission status was agreed to be Admission Status: observation status to the service of Dr David Burns           Follow-up Information    None         Patient's Medications   Discharge Prescriptions    No medications on file     No discharge procedures on file      PDMP Review       Value Time User    PDMP Reviewed  Yes 4/29/2021 11:46 PM Veronica Briones [place]          ED Provider  Electronically Signed by           Calvin Santiago PA-C  04/29/21 6191

## 2021-04-30 NOTE — ED NOTES
Patient unable to provide stool sample, urine collected and sent to lab at this time     Sheyla Middleton  04/29/21 5860

## 2021-04-30 NOTE — ASSESSMENT & PLAN NOTE
· Admitted 4/26 for pyelonephritis and discharged the following day with Rx for Macrobid  · Afebrile, no leukocytosis  · Urine culture on 03/23 showed alpha hemolytic Streptococcus; possibly contaminant  · Blood culture no growth at 72 hours  · Continue IV ceftriaxone for now  · Recheck renal ultrasound to rule out an abscess/complications from pyelonephritis given persistent symptoms of left flank pain  Patient reports nausea after getting Dilaudid  · Will decrease her Dilaudid to every 6 hours  Cont prn oxy  Pt said she does not want opioids as much as possible

## 2021-04-30 NOTE — ASSESSMENT & PLAN NOTE
· Was just admitted 4/26-4/27 and treated for pyelonephritis  This is her 4th presentation to ED with same complaint of flank and abdominal pain  · RUQ US: No evidence of cholelithiasis, cholecystitis or biliary obstruction  2   Mildly increased echogenicity in the right renal cortex could represent medical renal disease  No hydronephrosis  · Apr 23: CT scan A/P:  Negative for acute pathology to account for patient's symptoms  · Patient reports improvement in pain this morning    · Pain control  · IV PPI daily

## 2021-04-30 NOTE — ASSESSMENT & PLAN NOTE
Lab Results   Component Value Date    HGBA1C 6 0 02/15/2021     · Will continue home insulin at decreased dose during acute illness  · Add sliding scale insulin  · ADA diet  No results for input(s): POCGLU in the last 72 hours      Blood Sugar Average: Last 72 hrs:

## 2021-04-30 NOTE — ASSESSMENT & PLAN NOTE
· Creatinine 2 8 on admission, baseline 1 5-2 1  · Suspect due to prerenal because decreased oral intake  · Reports diarrhea and emesis, unable to tolerate anything orally  · Creatinine slightly better this morning  2 7    · Continue IV hydration  · Avoid nephrotoxins  · Monitor lytes  · Monitor BMP

## 2021-04-30 NOTE — ASSESSMENT & PLAN NOTE
· Creatinine 2 8, baseline 1 5-2 1  · Reports diarrhea and emesis, unable to tolerate anything orally  · IV hydration  · Avoid nephrotoxins  · Monitor lytes  · Monitor BMP

## 2021-04-30 NOTE — ASSESSMENT & PLAN NOTE
Lab Results   Component Value Date    HGBA1C 6 0 02/15/2021     Recent Labs     04/30/21  0106 04/30/21  0922   POCGLU 94 112       Blood Sugar Average: Last 72 hrs:  (P) 103       Continue Lantus 15 units with sliding scale insulin for now

## 2021-04-30 NOTE — ASSESSMENT & PLAN NOTE
· Reports poor appetite and persistent diarrhea; h/o irritable bowel syndrome  · Follow-up stool for C diff and enteric bacterial panel PCR

## 2021-04-30 NOTE — ASSESSMENT & PLAN NOTE
· Was just admitted 4/26-4/27 and treated for pyelonephritis  This is her 4th presentation to ED with same complaint of flank and abdominal pain  · RUQ US: No evidence of cholelithiasis, cholecystitis or biliary obstruction  2   Mildly increased echogenicity in the right renal cortex could represent medical renal disease  No hydronephrosis  · Apr 23: CT scan A/P:  Negative for acute pathology to account for patient's symptoms     · Pain control  · Add IV PPI daily  · Clear liquid diet, advance as tolerated

## 2021-05-01 VITALS
RESPIRATION RATE: 19 BRPM | BODY MASS INDEX: 29.66 KG/M2 | TEMPERATURE: 97.9 F | DIASTOLIC BLOOD PRESSURE: 83 MMHG | WEIGHT: 161.16 LBS | OXYGEN SATURATION: 99 % | SYSTOLIC BLOOD PRESSURE: 133 MMHG | HEIGHT: 62 IN | HEART RATE: 70 BPM

## 2021-05-01 PROBLEM — N12 PYELONEPHRITIS: Status: RESOLVED | Noted: 2021-04-26 | Resolved: 2021-05-01

## 2021-05-01 PROBLEM — R11.2 NAUSEA & VOMITING: Status: RESOLVED | Noted: 2021-04-29 | Resolved: 2021-05-01

## 2021-05-01 LAB
ANION GAP SERPL CALCULATED.3IONS-SCNC: 9 MMOL/L (ref 4–13)
BACTERIA BLD CULT: NORMAL
BACTERIA BLD CULT: NORMAL
BUN SERPL-MCNC: 34 MG/DL (ref 5–25)
C DIFF TOX GENS STL QL NAA+PROBE: NEGATIVE
CALCIUM SERPL-MCNC: 8.3 MG/DL (ref 8.3–10.1)
CAMPYLOBACTER DNA SPEC NAA+PROBE: NORMAL
CHLORIDE SERPL-SCNC: 110 MMOL/L (ref 100–108)
CO2 SERPL-SCNC: 24 MMOL/L (ref 21–32)
CREAT SERPL-MCNC: 1.94 MG/DL (ref 0.6–1.3)
ERYTHROCYTE [DISTWIDTH] IN BLOOD BY AUTOMATED COUNT: 13.1 % (ref 11.6–15.1)
GFR SERPL CREATININE-BSD FRML MDRD: 30 ML/MIN/1.73SQ M
GLUCOSE SERPL-MCNC: 82 MG/DL (ref 65–140)
GLUCOSE SERPL-MCNC: 93 MG/DL (ref 65–140)
GLUCOSE SERPL-MCNC: 97 MG/DL (ref 65–140)
HCT VFR BLD AUTO: 36.6 % (ref 34.8–46.1)
HGB BLD-MCNC: 11.8 G/DL (ref 11.5–15.4)
MCH RBC QN AUTO: 31 PG (ref 26.8–34.3)
MCHC RBC AUTO-ENTMCNC: 32.2 G/DL (ref 31.4–37.4)
MCV RBC AUTO: 96 FL (ref 82–98)
PLATELET # BLD AUTO: 238 THOUSANDS/UL (ref 149–390)
PMV BLD AUTO: 9.8 FL (ref 8.9–12.7)
POTASSIUM SERPL-SCNC: 4.1 MMOL/L (ref 3.5–5.3)
RBC # BLD AUTO: 3.81 MILLION/UL (ref 3.81–5.12)
SALMONELLA DNA SPEC QL NAA+PROBE: NORMAL
SHIGA TOXIN STX GENE SPEC NAA+PROBE: NORMAL
SHIGELLA DNA SPEC QL NAA+PROBE: NORMAL
SODIUM SERPL-SCNC: 143 MMOL/L (ref 136–145)
WBC # BLD AUTO: 5.92 THOUSAND/UL (ref 4.31–10.16)

## 2021-05-01 PROCEDURE — 85027 COMPLETE CBC AUTOMATED: CPT | Performed by: INTERNAL MEDICINE

## 2021-05-01 PROCEDURE — 80048 BASIC METABOLIC PNL TOTAL CA: CPT | Performed by: INTERNAL MEDICINE

## 2021-05-01 PROCEDURE — 99217 PR OBSERVATION CARE DISCHARGE MANAGEMENT: CPT | Performed by: INTERNAL MEDICINE

## 2021-05-01 PROCEDURE — 82948 REAGENT STRIP/BLOOD GLUCOSE: CPT

## 2021-05-01 RX ORDER — METRONIDAZOLE 500 MG/1
500 TABLET ORAL EVERY 12 HOURS SCHEDULED
Status: DISCONTINUED | OUTPATIENT
Start: 2021-05-01 | End: 2021-05-01 | Stop reason: HOSPADM

## 2021-05-01 RX ORDER — METRONIDAZOLE 500 MG/1
500 TABLET ORAL EVERY 12 HOURS SCHEDULED
Qty: 13 TABLET | Refills: 0 | Status: SHIPPED | OUTPATIENT
Start: 2021-05-01 | End: 2021-05-01

## 2021-05-01 RX ORDER — METRONIDAZOLE 500 MG/1
500 TABLET ORAL EVERY 12 HOURS SCHEDULED
Qty: 13 TABLET | Refills: 0 | Status: SHIPPED | OUTPATIENT
Start: 2021-05-01 | End: 2021-05-09

## 2021-05-01 RX ORDER — PANTOPRAZOLE SODIUM 40 MG/1
40 TABLET, DELAYED RELEASE ORAL
Status: DISCONTINUED | OUTPATIENT
Start: 2021-05-01 | End: 2021-05-01 | Stop reason: HOSPADM

## 2021-05-01 RX ADMIN — ONDANSETRON 4 MG: 2 INJECTION INTRAMUSCULAR; INTRAVENOUS at 06:45

## 2021-05-01 RX ADMIN — Medication 250 MG: at 09:56

## 2021-05-01 RX ADMIN — DEXTROAMPHETAMINE SACCHARATE, AMPHETAMINE ASPARTATE, DEXTROAMPHETAMINE SULFATE AND AMPHETAMINE SULFATE 20 MG: 2.5; 2.5; 2.5; 2.5 TABLET ORAL at 06:00

## 2021-05-01 RX ADMIN — METRONIDAZOLE 500 MG: 500 TABLET ORAL at 09:56

## 2021-05-01 RX ADMIN — BUPROPION HYDROCHLORIDE 300 MG: 150 TABLET, EXTENDED RELEASE ORAL at 09:56

## 2021-05-01 RX ADMIN — AMLODIPINE BESYLATE 5 MG: 5 TABLET ORAL at 09:57

## 2021-05-01 RX ADMIN — CEFTRIAXONE SODIUM 1000 MG: 10 INJECTION, POWDER, FOR SOLUTION INTRAVENOUS at 00:32

## 2021-05-01 RX ADMIN — HYDROMORPHONE HYDROCHLORIDE 0.2 MG: 1 INJECTION, SOLUTION INTRAMUSCULAR; INTRAVENOUS; SUBCUTANEOUS at 06:50

## 2021-05-01 RX ADMIN — ATORVASTATIN CALCIUM 20 MG: 20 TABLET, FILM COATED ORAL at 09:56

## 2021-05-01 RX ADMIN — DEXTROAMPHETAMINE SACCHARATE, AMPHETAMINE ASPARTATE, DEXTROAMPHETAMINE SULFATE AND AMPHETAMINE SULFATE 20 MG: 2.5; 2.5; 2.5; 2.5 TABLET ORAL at 13:17

## 2021-05-01 RX ADMIN — PANTOPRAZOLE SODIUM 40 MG: 40 TABLET, DELAYED RELEASE ORAL at 09:57

## 2021-05-01 NOTE — DISCHARGE SUMMARY
[facility]  Discharge- Shaun Bryson 1970, 48 y o  female MRN: [id number]  Unit/Bed#: E5 -01 Encounter: [id number]  Primary Care Provider: Talat Mcqueen PA-C   Date and time admitted to hospital: 4/29/2021  6:30 PM    Admitting Provider:  Polina Samayoa DO  Discharge Provider:  Danitza Robertson DO  Admission Date: 4/29/2021       Discharge Date: 05/01/21   LOS: 0  Primary Care Physician at Discharge: Talat Mcqueen, [address] [place] [address]:  Shaun Bryson is a 48 y o  female who presented back pain, diarrhea as well as acute kidney injury  The patient had multiple admissions for pyelonephritis and had been recently treated for urinary tract infection  The patient's urine culture results were reviewed, there was evidence of Trichomonas  She did clinically improving her renal function did stabilize  The patient was subsequently discharged home with planned outpatient follow-up  At the time of discharge she was tolerating oral diet, she was without acute complaint and was medically cleared for discharge  She will follow-up in 1 week's time with her primary care  Provider with repeat BMP  She was advised that she should ask her partner to be treated for Trichomonas as well    Thank you for choosing [facility] for your healthcare needs  If I can be of any assistance in the future, please feel free to contact me  DISCHARGE DIAGNOSES  Attention deficit hyperactivity disorder (ADHD)  Assessment & Plan  · Continue Adderall Er 20mg BID  Verified on PDMP    Chronic narcotic use  Assessment & Plan  · On Oxycodone-Acetaminophen 10-325mg up to 4x daily    Verified on PDMP    Diarrhea of presumed infectious origin  Assessment & Plan  · Reports poor appetite and persistent diarrhea; h/o irritable bowel syndrome  · Improved at the time of discharge     Intractable abdominal pain  Assessment & Plan  · Was just admitted 4/26-4/27 and treated for pyelonephritis  This is her 4th presentation to ED with same complaint of flank and abdominal pain  · RUQ US: No evidence of cholelithiasis, cholecystitis or biliary obstruction  2   Mildly increased echogenicity in the right renal cortex could represent medical renal disease  No hydronephrosis  · Apr 23: CT scan A/P:  Negative for acute pathology to account for patient's symptoms  Urine culture reviewed, less than 30,000 colonies  Evidence of Trichomonas - likely etiology of patient's abdominal pain  Abdominal pain resolved        Bipolar 1 disorder ([address] )  Assessment & Plan  · Continue Seroquel and Wellbutrin    Type 2 diabetes mellitus with renal complication [facility])  Assessment & Plan  Lab Results   Component Value Date    HGBA1C 6 0 02/15/2021     Recent Labs     04/30/21  1600 04/30/21  2055 05/01/21  0748 05/01/21  1118   POCGLU 108 110 82 97   Resume diabetic regimen at discharge  Well controlled as an outpatient    Blood Sugar Average: Last 72 hrs:  (P) 108 [id number]       Continue Lantus 15 units with sliding scale insulin for now  * Acute renal failure superimposed on stage 3 chronic kidney disease (HCC)  Assessment & Plan  · Creatinine 2 8 on admission, baseline 1 5-2 1  · Suspect due to prerenal because decreased oral intake  · Reports diarrhea and emesis, unable to tolerate anything orally    Renal function improved  Macrobid contraindicated in GFR less than 60, and discontinued  Follow-up renal ultrasound as an outpatient  Repeat BMP in 1 week  Recent Labs     04/29/21  1945 04/30/21  0618 05/01/21  0515   CREATININE 2 86* 2 72* 1 94*         Nausea & vomiting-resolved as of 5/1/2021  Assessment & Plan  · IV hydration  · P r n  antiemetics   · Cleared liquid diet, advance as tolerated  · Monitor lytes    Pyelonephritis-resolved as of 5/1/2021  Assessment & Plan  · Admitted 4/26 for pyelonephritis and discharged the following day with Rx for Macrobid     · Afebrile, no leukocytosis  · Urine culture on 03/23 showed alpha hemolytic Streptococcus; possibly contaminant  · Blood culture no growth at 72 hours  · Continue IV ceftriaxone for now  · Recheck renal ultrasound to rule out an abscess/complications from pyelonephritis given persistent symptoms of left flank pain  Patient reports nausea after getting Dilaudid  · Will decrease her Dilaudid to every 6 hours  Cont prn oxy  Pt said she does not want opioids as much as possible  CONSULTING PROVIDERS   None    PROCEDURES PERFORMED  * No surgery found *    RADIOLOGY RESULTS  Ct Abdomen Pelvis Wo Contrast    Result Date: 4/23/2021  Narrative: CT ABDOMEN AND PELVIS WITHOUT IV CONTRAST INDICATION:   Flank pain, kidney stone suspected Left flank pain, possible pyelonephritis  GFR 26  COMPARISON:  November 10, 2020 TECHNIQUE:  CT examination of the abdomen and pelvis was performed without intravenous contrast   Axial, sagittal, and coronal 2D reformatted images were created from the source data and submitted for interpretation  Radiation dose length product (DLP) for this visit:  486 mGy-cm   This examination, like all CT scans performed in the [facility], was performed utilizing techniques to minimize radiation dose exposure, including the use of iterative reconstruction and automated exposure control  Enteric contrast was not administered  FINDINGS: ABDOMEN LOWER CHEST:  Groundglass changes probably representing subpleural atelectasis noted in the costophrenic angles LIVER/BILIARY TREE:  Unremarkable  GALLBLADDER:  No calcified gallstones  No pericholecystic inflammatory change  SPLEEN:  Unremarkable  PANCREAS:  No apparent pancreatic /peripancreatic inflammatory changes  ADRENAL GLANDS:  Benign 19 mm left adrenal adenoma is unchanged  KIDNEYS/URETERS:  Right extrarenal pelvis noted  No urinary tract calculi  No hydronephrosis  No noncontrast CT evidence of suspicious renal mass   STOMACH AND BOWEL:  There is a gastric wall stimulator device present with leads unchanged in position from prior exam   Sigmoid colon diverticulosis without acute diverticulitis  APPENDIX:  A normal appendix was visualized  ABDOMINOPELVIC CAVITY:  No ascites  No pneumoperitoneum  No lymphadenopathy  VESSELS:  Unremarkable for patient's age  PELVIS REPRODUCTIVE ORGANS:  A 3 5 cm cystic structure in the posterior left adnexa similar when compared to prior examinations at least as far back as 2018  URINARY BLADDER:  Unremarkable  ABDOMINAL WALL/INGUINAL REGIONS:  Gastric stimulator device body is located in the deep left anterior abdominal wall subcutaneous tissues  OSSEOUS STRUCTURES:  No acute fracture or destructive osseous lesion  Impression: No acute pathology to account for the patient's symptoms  Colonic diverticulosis without acute diverticulitis  Benign left adrenal adenoma, unchanged  Unchanged circumscribed oval cystic structure in the left adnexa, present over multiple prior examinations  If the patient is postmenopausal, this could be better followed with pelvic sonography  Workstation performed: [id number]     Us Right Upper Quadrant    Result Date: 4/29/2021  Narrative: RIGHT UPPER QUADRANT ULTRASOUND INDICATION:     Right upper quadrant pain  COMPARISON:  4/23/2021 TECHNIQUE:   Real-time ultrasound of the right upper quadrant was performed with a curvilinear transducer with both volumetric sweeps and still imaging techniques  FINDINGS: PANCREAS:  Visualized portions of the pancreas are within normal limits  AORTA AND IVC:  Visualized portions are normal for patient age  LIVER: Size:  Within normal range  The liver measures 12 cm in the midclavicular line  Contour:  Surface contour is smooth  Parenchyma:  Normal echogenicity and echotexture  No evidence of mass  Limited imaging of the main portal vein shows it to be patent and hepatopetal   BILIARY: Partially distended gallbladder  No cholelithiasis or sludge   No gallbladder wall thickening or pericholecystic fluid  No intrahepatic biliary dilatation  Common bile duct measures 2 mm at the bogdan hepatis  No visualized choledocholithiasis  KIDNEY: Right kidney measures 8 6 cm  Increased cortical echogenicity  No hydronephrosis  ASCITES:   None  Impression: 1  No evidence of cholelithiasis, cholecystitis or biliary obstruction  2   Mildly increased echogenicity in the right renal cortex could represent medical renal disease  No hydronephrosis   Workstation performed: [id number]       LABS  Results from last 7 days   Lab Units 05/01/21 0515 04/30/21 0618 04/29/21 1945 04/27/21 0612 04/26/21 1727 04/25/21  1401   WBC Thousand/uL 5 92 5 33 6 32 4 26* 5 71 7 18   HEMOGLOBIN g/dL 11 8 11 8 12 7 11 2* 12 7 12 7   HEMATOCRIT % 36 6 35 9 39 0 34 3* 38 6 37 7   MCV fL 96 93 94 95 93 92   PLATELETS Thousands/uL 238 242 279 206 258 264   INR   --   --   --   --  1 07  --      Results from last 7 days   Lab Units 05/01/21 0515 04/30/21 0618 04/29/21 1945 04/27/21 0612 04/26/21 1727 04/25/21  1401   SODIUM mmol/L 143 144 139 144 143 140   POTASSIUM mmol/L 4 1 3 5 3 9 3 6 4 2 5 5*   CHLORIDE mmol/L 110* 108 105 111* 108 113*   CO2 mmol/L 24 23 22 24 23 22   BUN mg/dL 34* 34* 37* 19 24 32*   CREATININE mg/dL 1 94* 2 72* 2 86* 1 98* 2 53* 2 59*   CALCIUM mg/dL 8 3 8 3 8 9 8 8 9 2 8 8   ALBUMIN g/dL  --   --  4 0  --  3 8 3 7   TOTAL BILIRUBIN mg/dL  --   --  0 22  --  0 23 0 39   ALK PHOS U/L  --   --  129*  --  133* 127*   ALT U/L  --   --  26  --  27 26   AST U/L  --   --  21  --  23 48*   EGFR ml/min/1 73sq m 30 20 18 29 21 21   GLUCOSE RANDOM mg/dL 93 92 151* 76 113 69     Results from last 7 days   Lab Units 04/29/21  1945 04/26/21  1727   TROPONIN I ng/mL <0 02 <0 02              Results from last 7 days   Lab Units 05/01/21  1118 05/01/21  0748 04/30/21  2055 04/30/21  1600 04/30/21  1124 04/30/21  0922 04/30/21  0106 04/26/21  2140   POC GLUCOSE mg/dl 97 82 110 108 154* 112 94 81             Results from last 7 days   Lab Units 04/27/21  0623 04/26/21  1727   LACTIC ACID mmol/L  --  1 0   PROCALCITONIN ng/ml <0 05 <0 05           Cultures:   Results from last 7 days   Lab Units 04/29/21  2111   COLOR UA  Yellow   CLARITY UA  Slightly Cloudy   SPEC GRAV UA  >=1 030   PH UA  5 5   LEUKOCYTES UA  Trace*   NITRITE UA  Negative   GLUCOSE UA mg/dl Negative   KETONES UA mg/dl Negative   BILIRUBIN UA  Negative   BLOOD UA  Negative      Results from last 7 days   Lab Units 04/29/21  2111   RBC UA /hpf None Seen   WBC UA /hpf 2-4   EPITHELIAL CELLS WET PREP /hpf Occasional   BACTERIA UA /hpf Occasional      Results from last 7 days   Lab Units 04/30/21  1140 04/26/21  1730 04/26/21  1727 04/25/21  1428   BLOOD CULTURE   --  No Growth After 4 Days  No Growth After 4 Days    --    URINE CULTURE   --   --   --  No Growth <1000 cfu/mL   C DIFF TOXIN B BY PCR  Negative  --   --   --        PHYSICAL EXAM:  Vitals:   Blood Pressure: 133/83 (05/01/21 0749)  Pulse: 70 (05/01/21 0749)  Temperature: 97 9 °F (36 6 °C) (05/01/21 0749)  Temp Source: Oral (04/30/21 1439)  Respirations: 19 (05/01/21 0749)  Height: 5' 2" (157 5 cm) (04/30/21 0018)  Weight - Scale: 73 1 kg (161 lb 2 5 oz) (04/30/21 0018)  SpO2: 99 % (05/01/21 0749)      General: well appearing, no acute distress  HEENT: atraumatic, PERRLA, moist mucosa, normal pharynx, normal tonsils and adenoids, normal tongue, no fluid in sinuses  Neck: Trachea midline, no carotid bruit, no masses  Respiratory: normal chest wall expansion, CTA B, no r/r/w, no rubs  Cardiovascular: RRR, no m/r/g, Normal S1 and S2  Abdomen: Soft, non-tender, non-distended, normal bowel sounds in all quadrants, no hepatosplenomegaly, no tympany  Rectal: deferred  Musculoskeletal: normal ROM in upper and lower extremities  Integumentary: warm, dry, and pink, with no rash, purpura, or petechia  Heme/Lymph: no lymphadenopathy, no bruises  Neurological: Cranial Nerves II-XII grossly intact, no tics, normal sensation to pressure and light touch  Psychiatric: cooperative with normal mood, affect, and cognition      Discharge Disposition: Home/Self Care      Test Results Pending at Discharge:           Medications   · Summary of Medication Adjustments made as a result of this hospitalization:  No new home medications  · Medication Dosing Tapers - Please refer to Discharge Medication List for details on any medication dosing tapers (if applicable to patient)  · Discharge Medication List: See after visit summary for reconciled discharge medications  Diet restrictions:         Diet Orders   (From admission, onward)             Start     Ordered    04/30/21 0944  Diet Regular; Regular House  Diet effective now     Question Answer Comment   Diet Type Regular    Regular Regular House    RD to adjust diet per protocol? Yes        04/30/21 0943              Activity restrictions: No strenuous activity  Discharge Condition: good    Outpatient Follow-Up and Discharge Instructions  See after visit summary section titled Discharge Instructions for information provided to patient and family  Code Status: Level 1 - Full Code  Discharge Statement   I spent 35 minutes discharging the patient  This time was spent on the day of discharge  Greater than 50% of total time was spent with the patient and / or family counseling and / or coordination of care  ** Please Note: This note was completed in part utilizing M-Modal Fluency Direct Software  Grammatical errors, random word insertions, spelling mistakes, and incomplete sentences may be an occasional consequence of this system secondary to software limitations, ambient noise, and hardware issues  If you have any questions or concerns about the content, text, or information contained within the body of this dictation, please contact the provider for clarification  **

## 2021-05-01 NOTE — ASSESSMENT & PLAN NOTE
· Was just admitted 4/26-4/27 and treated for pyelonephritis  This is her 4th presentation to ED with same complaint of flank and abdominal pain  · RUQ US: No evidence of cholelithiasis, cholecystitis or biliary obstruction  2   Mildly increased echogenicity in the right renal cortex could represent medical renal disease  No hydronephrosis  · Apr 23: CT scan A/P:  Negative for acute pathology to account for patient's symptoms       Urine culture reviewed, less than 30,000 colonies  Evidence of Trichomonas - likely etiology of patient's abdominal pain  Abdominal pain resolved

## 2021-05-01 NOTE — ASSESSMENT & PLAN NOTE
· Reports poor appetite and persistent diarrhea; h/o irritable bowel syndrome  · Improved at the time of discharge

## 2021-05-01 NOTE — DISCHARGE INSTRUCTIONS
Trichomoniasis   WHAT YOU NEED TO KNOW:   Trichomoniasis is a common sexually transmitted infection (STI)  It is spread between people during sex or genital contact  Trichomoniasis is caused by tiny parasites that are too small to be seen  DISCHARGE INSTRUCTIONS:   Medicines:   · Antibiotics:  Always take your antibiotics exactly as ordered by your healthcare provider  Keep taking this medicine until it is completely gone, even if you feel better  If you stop taking antibiotics before they are gone, they may not completely cure your infection  Never save antibiotics or take leftover antibiotics that were given to you for another illness  Do not drink contain alcohol while you use antibiotic medicine to treat trichomoniasis  It may make you sick  Wait at least 3 days after your last dose of medicine before you drink alcohol again  Also avoid over-the-counter medicines that contain alcohol, such as certain cough or cold medicines  · Over-the-counter pain medicine: You may use over-the-counter (OTC) pain medicines, such as ibuprofen or acetaminophen, for pain or swelling  These medicines may be bought without a healthcare provider's order  These medicines are safe for most people to use  However, they can cause serious problems when they are not used correctly  People with certain medical conditions, or using certain other medicines are at a higher risk for problems  Using too much, or using these medicines for longer than the label says can also cause problems  Follow directions on the label carefully  If you have questions, talk to your healthcare provider  · Take your medicine as directed  Contact your healthcare provider if you think your medicine is not helping or if you have side effects  Tell him of her if you are allergic to any medicine  Keep a list of the medicines, vitamins, and herbs you take  Include the amounts, and when and why you take them   Bring the list or the pill bottles to follow-up visits  Carry your medicine list with you in case of an emergency  Self care:   · Sex:  Tell your sexual partners that you have this infection  They may also be infected and need treatment  Do not have sex until both you and your partner are done with treatment and all symptoms are gone  · Bathing and handwashing:  Wash your hands thoroughly with soap and warm water after going to the bathroom  This helps keep your infection from spreading to other parts of your body, such as your eyes  Keep your genital area clean and dry  Take showers instead of tub baths and use plain, unscented soap  · Advice for women:  Do not douche during treatment unless your healthcare provider tells you to  Do not use feminine hygiene sprays or powders  Prevent trichomoniasis:  You can get trichomoniasis more than once  Limit the number of sexual partners you have to decrease your risk for another infection  Do not have unprotected sex (including oral sex)  Always wear a latex condom during sex to prevent trichomoniasis and other STIs  Use a new condom after each ejaculation  For more information:   · Division of STD Prevention, Centers for Disease Control and Prevention  [facility] [facility] , [address] [place] [address]  Phone: 0- [phone]  Web Address: RentRule com au    · [address] Astrid Murray (CRISTY)  P O  [address] [facility] , [address] [place] [address]  Web Address: http://ViewCast/  org  Contact your healthcare provider if:   · Your symptoms become worse, or come back after treatment  · You have unusual vaginal bleeding  · You have any problems that may be caused by the medicine you are taking  · You have questions or concerns about your condition or care  © Copyright [address] [place] [address] Information is for End User's use only and may not be sold, redistributed or otherwise used for commercial purposes   All illustrations and images included in CareNotes® are the copyrighted property of [employer]  or [address] [place] [address] Marsha   The above information is an  only  It is not intended as medical advice for individual conditions or treatments  Talk to your doctor, nurse or pharmacist before following any medical regimen to see if it is safe and effective for you

## 2021-05-01 NOTE — ASSESSMENT & PLAN NOTE
Lab Results   Component Value Date    HGBA1C 6 0 02/15/2021     Recent Labs     04/30/21  1600 04/30/21 2055 05/01/21  0748 05/01/21  1118   POCGLU 108 110 82 97   Resume diabetic regimen at discharge  Well controlled as an outpatient    Blood Sugar Average: Last 72 hrs:  (P) 108 [id number]       Continue Lantus 15 units with sliding scale insulin for now

## 2021-05-01 NOTE — DISCHARGE INSTR - AVS FIRST PAGE
Dear Linda Vieira,     It was our pleasure to care for you here at [facility]  It is our hope that we were always able to exceed the expected standards for your care during your stay  You were hospitalized due to acute kidney injury on chronic kidney disease stage 3 as well as Trichomonas infection  You were cared for on the 5th floor by Ian Ng DO with the [facility] who covers for your primary care physician (PCP), Mamadou Newman PA-C, while you were hospitalized  If you have any questions or concerns related to this hospitalization, you may contact us at [phone]  For follow up as well as any medication refills, we recommend that you follow up with your primary care physician  A registered nurse will reach out to you by phone within a few days after your discharge to answer any additional questions that you may have after going home  However, at this time we provide for you here, the most important instructions / recommendations at discharge:     · Notable Medication Adjustments -   · Start metronidazole 500 mg 3 times a day for 1 week  ·   · Testing Required after Discharge -   · Repeat BMP in 1 week with your PCP  · Important follow up information -   · Your partner must be cleared from infection  · Please follow-up renal ultrasound of the bladder and kidneys - this is pending at the time of discharge  · Other Instructions -   · Hydrate adequate  · Please review this entire after visit summary as additional general instructions including medication list, appointments, activity, diet, any pertinent wound care, and other additional recommendations from your care team that may be provided for you  Sincerely,     Ian Ng DO and Nurse Rey Jacobs

## 2021-05-01 NOTE — ASSESSMENT & PLAN NOTE
· Creatinine 2 8 on admission, baseline 1 5-2 1  · Suspect due to prerenal because decreased oral intake    · Reports diarrhea and emesis, unable to tolerate anything orally    Renal function improved  Macrobid contraindicated in GFR less than 60, and discontinued  Follow-up renal ultrasound as an outpatient  Repeat BMP in 1 week  Recent Labs     04/29/21  1945 04/30/21  0618 05/01/21  0515   CREATININE 2 86* 2 72* 1 94*

## 2021-05-01 NOTE — PROGRESS NOTES
The pantoprazole IV  has / have been converted to Oral per [facility] IV-to-PO Auto-Conversion Protocol for Adults as approved by the Pharmacy and Therapeutics Committee  The patient met all eligible criteria:  1) Age = > 25years old   2) Received at least one dose of the IV form   3) Receiving at least one other scheduled oral/enteral medication   4) Tolerating an oral/enteral diet   and did not have any exclusions:   1) Critical care patient   2) Active GI bleed (IF assessing H2RAs or PPIs)   3) Continuous tube feeding (IF assessing cipro, doxycycline, levofloxacin, minocycline, rifampin, or voriconazole)   4) Receiving PO vancomycin (IF assessing metronidazole)   5) Persistent nausea and/or vomiting   6) Ileus or gastrointestinal obstruction   7) Toya/nasogastric tube set for continuous suction   8) Specific order not to automatically convert to PO (in the order's comments or if discussed in the most recent Infectious Disease or primary team's progress notes)

## 2021-05-01 NOTE — PLAN OF CARE
Problem: Potential for Falls  Goal: Patient will remain free of falls  Description: INTERVENTIONS:  - Assess patient frequently for physical needs  -  Identify cognitive and physical deficits and behaviors that affect risk of falls  -  [place] fall precautions as indicated by assessment   - Educate patient/family on patient safety including physical limitations  - Instruct patient to call for assistance with activity based on assessment  - Modify environment to reduce risk of injury  - Consider OT/PT consult to assist with strengthening/mobility  Outcome: Progressing     Problem: Prexisting or High Potential for Compromised Skin Integrity  Goal: Skin integrity is maintained or improved  Description: INTERVENTIONS:  - Identify patients at risk for skin breakdown  - Assess and monitor skin integrity  - Assess and monitor nutrition and hydration status  - Monitor labs   - Assess for incontinence   - Turn and reposition patient  - Assist with mobility/ambulation  - Relieve pressure over bony prominences  - Avoid friction and shearing  - Provide appropriate hygiene as needed including keeping skin clean and dry  - Evaluate need for skin moisturizer/barrier cream  - Collaborate with interdisciplinary team   - Patient/family teaching  - Consider wound care consult   Outcome: Progressing     Problem: Nutrition/Hydration-ADULT  Goal: Nutrient/Hydration intake appropriate for improving, restoring or maintaining nutritional needs  Description: Monitor and assess patient's nutrition/hydration status for malnutrition  Collaborate with interdisciplinary team and initiate plan and interventions as ordered  Monitor patient's weight and dietary intake as ordered or per policy  Utilize nutrition screening tool and intervene as necessary  Determine patient's food preferences and provide high-protein, high-caloric foods as appropriate       INTERVENTIONS:  - Monitor oral intake, urinary output, labs, and treatment plans  - Assess nutrition and hydration status and recommend course of action  - Evaluate amount of meals eaten  - Assist patient with eating if necessary   - Allow adequate time for meals  - Recommend/ encourage appropriate diets, oral nutritional supplements, and vitamin/mineral supplements  - Order, calculate, and assess calorie counts as needed  - Recommend, monitor, and adjust tube feedings and TPN/PPN based on assessed needs  - Assess need for intravenous fluids  - Provide specific nutrition/hydration education as appropriate  - Include patient/family/caregiver in decisions related to nutrition  Outcome: Progressing     Problem: METABOLIC, FLUID AND ELECTROLYTES - ADULT  Goal: Electrolytes maintained within normal limits  Description: INTERVENTIONS:  - Monitor labs and assess patient for signs and symptoms of electrolyte imbalances  - Administer electrolyte replacement as ordered  - Monitor response to electrolyte replacements, including repeat lab results as appropriate  - Instruct patient on fluid and nutrition as appropriate  Outcome: Progressing  Goal: Fluid balance maintained  Description: INTERVENTIONS:  - Monitor labs   - Monitor I/O and WT  - Instruct patient on fluid and nutrition as appropriate  - Assess for signs & symptoms of volume excess or deficit  Outcome: Progressing  Goal: Glucose maintained within target range  Description: INTERVENTIONS:  - Monitor Blood Glucose as ordered  - Assess for signs and symptoms of hyperglycemia and hypoglycemia  - Administer ordered medications to maintain glucose within target range  - Assess nutritional intake and initiate nutrition service referral as needed  Outcome: Progressing     Problem: HEMATOLOGIC - ADULT  Goal: Maintains hematologic stability  Description: INTERVENTIONS  - Assess for signs and symptoms of bleeding or hemorrhage  - Monitor labs  - Administer supportive blood products/factors as ordered and appropriate  Outcome: Progressing     Problem: MUSCULOSKELETAL - ADULT  Goal: Maintain or return mobility to safest level of function  Description: INTERVENTIONS:  - Assess patient's ability to carry out ADLs; assess patient's baseline for ADL function and identify physical deficits which impact ability to perform ADLs (bathing, care of mouth/teeth, toileting, grooming, dressing, etc )  - Assess/evaluate cause of self-care deficits   - Assess range of motion  - Assess patient's mobility  - Assess patient's need for assistive devices and provide as appropriate  - Encourage maximum independence but intervene and supervise when necessary  - Involve family in performance of ADLs  - Assess for home care needs following discharge   - Consider OT consult to assist with ADL evaluation and planning for discharge  - Provide patient education as appropriate  Outcome: Progressing  Goal: Maintain proper alignment of affected body part  Description: INTERVENTIONS:  - Support, maintain and protect limb and body alignment  - Provide patient/ family with appropriate education  Outcome: Progressing     Problem: PAIN - ADULT  Goal: Verbalizes/displays adequate comfort level or baseline comfort level  Description: Interventions:  - Encourage patient to monitor pain and request assistance  - Assess pain using appropriate pain scale  - Administer analgesics based on type and severity of pain and evaluate response  - Implement non-pharmacological measures as appropriate and evaluate response  - Consider cultural and social influences on pain and pain management  - Notify physician/advanced practitioner if interventions unsuccessful or patient reports new pain  Outcome: Progressing     Problem: INFECTION - ADULT  Goal: Absence or prevention of progression during hospitalization  Description: INTERVENTIONS:  - Assess and monitor for signs and symptoms of infection  - Monitor lab/diagnostic results  - Monitor all insertion sites, i e  indwelling lines, tubes, and drains  - Monitor endotracheal if appropriate and nasal secretions for changes in amount and color  - [place] appropriate cooling/warming therapies per order  - Administer medications as ordered  - Instruct and encourage patient and family to use good hand hygiene technique  - Identify and instruct in appropriate isolation precautions for identified infection/condition  Outcome: Progressing  Goal: Absence of fever/infection during neutropenic period  Description: INTERVENTIONS:  - Monitor WBC    Outcome: Progressing     Problem: DISCHARGE PLANNING  Goal: Discharge to home or other facility with appropriate resources  Description: INTERVENTIONS:  - Identify barriers to discharge w/patient and caregiver  - Arrange for needed discharge resources and transportation as appropriate  - Identify discharge learning needs (meds, wound care, etc )  - Arrange for interpretive services to assist at discharge as needed  - Refer to Case Management Department for coordinating discharge planning if the patient needs post-hospital services based on physician/advanced practitioner order or complex needs related to functional status, cognitive ability, or social support system  Outcome: Progressing     Problem: Knowledge Deficit  Goal: Patient/family/caregiver demonstrates understanding of disease process, treatment plan, medications, and discharge instructions  Description: Complete learning assessment and assess knowledge base    Interventions:  - Provide teaching at level of understanding  - Provide teaching via preferred learning methods  Outcome: Progressing

## 2021-05-05 ENCOUNTER — HOSPITAL ENCOUNTER (EMERGENCY)
Facility: HOSPITAL | Age: 51
Discharge: HOME | End: 2021-05-05
Attending: EMERGENCY MEDICINE
Payer: COMMERCIAL

## 2021-05-05 ENCOUNTER — APPOINTMENT (EMERGENCY)
Dept: CT IMAGING | Facility: HOSPITAL | Age: 51
End: 2021-05-05
Payer: COMMERCIAL

## 2021-05-05 VITALS
SYSTOLIC BLOOD PRESSURE: 145 MMHG | DIASTOLIC BLOOD PRESSURE: 80 MMHG | OXYGEN SATURATION: 99 % | RESPIRATION RATE: 20 BRPM | HEART RATE: 92 BPM | TEMPERATURE: 98 F

## 2021-05-05 DIAGNOSIS — R10.9 CHRONIC ABDOMINAL PAIN: Primary | ICD-10-CM

## 2021-05-05 DIAGNOSIS — G89.29 CHRONIC ABDOMINAL PAIN: Primary | ICD-10-CM

## 2021-05-05 DIAGNOSIS — R19.7 DIARRHEA: ICD-10-CM

## 2021-05-05 LAB
ALBUMIN SERPL BCP-MCNC: 3.6 G/DL (ref 3.5–5)
ALP SERPL-CCNC: 134 U/L (ref 46–116)
ALT SERPL W P-5'-P-CCNC: 22 U/L (ref 12–78)
ANION GAP SERPL CALCULATED.3IONS-SCNC: 11 MMOL/L (ref 4–13)
AST SERPL W P-5'-P-CCNC: 70 U/L (ref 5–45)
BACTERIA UR QL AUTO: ABNORMAL /HPF
BASOPHILS # BLD AUTO: 0.02 THOUSANDS/ΜL (ref 0–0.1)
BASOPHILS NFR BLD AUTO: 0 % (ref 0–1)
BILIRUB SERPL-MCNC: 0.45 MG/DL (ref 0.2–1)
BILIRUB UR QL STRIP: NEGATIVE
BUN SERPL-MCNC: 30 MG/DL (ref 5–25)
CALCIUM SERPL-MCNC: 8.9 MG/DL (ref 8.3–10.1)
CHLORIDE SERPL-SCNC: 108 MMOL/L (ref 100–108)
CLARITY UR: CLEAR
CO2 SERPL-SCNC: 20 MMOL/L (ref 21–32)
COLOR UR: YELLOW
CREAT SERPL-MCNC: 2.07 MG/DL (ref 0.6–1.3)
EOSINOPHIL # BLD AUTO: 0.03 THOUSAND/ΜL (ref 0–0.61)
EOSINOPHIL NFR BLD AUTO: 0 % (ref 0–6)
ERYTHROCYTE [DISTWIDTH] IN BLOOD BY AUTOMATED COUNT: 13.2 % (ref 11.6–15.1)
GFR SERPL CREATININE-BSD FRML MDRD: 27 ML/MIN/1.73SQ M
GLUCOSE SERPL-MCNC: 121 MG/DL (ref 65–140)
GLUCOSE UR STRIP-MCNC: NEGATIVE MG/DL
HCT VFR BLD AUTO: 36.5 % (ref 34.8–46.1)
HGB BLD-MCNC: 12.1 G/DL (ref 11.5–15.4)
HGB UR QL STRIP.AUTO: ABNORMAL
IMM GRANULOCYTES # BLD AUTO: 0.02 THOUSAND/UL (ref 0–0.2)
IMM GRANULOCYTES NFR BLD AUTO: 0 % (ref 0–2)
KETONES UR STRIP-MCNC: NEGATIVE MG/DL
LEUKOCYTE ESTERASE UR QL STRIP: ABNORMAL
LYMPHOCYTES # BLD AUTO: 1.58 THOUSANDS/ΜL (ref 0.6–4.47)
LYMPHOCYTES NFR BLD AUTO: 23 % (ref 14–44)
MCH RBC QN AUTO: 30.7 PG (ref 26.8–34.3)
MCHC RBC AUTO-ENTMCNC: 33.2 G/DL (ref 31.4–37.4)
MCV RBC AUTO: 93 FL (ref 82–98)
MONOCYTES # BLD AUTO: 0.69 THOUSAND/ΜL (ref 0.17–1.22)
MONOCYTES NFR BLD AUTO: 10 % (ref 4–12)
NEUTROPHILS # BLD AUTO: 4.53 THOUSANDS/ΜL (ref 1.85–7.62)
NEUTS SEG NFR BLD AUTO: 67 % (ref 43–75)
NITRITE UR QL STRIP: NEGATIVE
NON-SQ EPI CELLS URNS QL MICRO: ABNORMAL /HPF
NRBC BLD AUTO-RTO: 0 /100 WBCS
PH UR STRIP.AUTO: 5.5 [PH]
PLATELET # BLD AUTO: 289 THOUSANDS/UL (ref 149–390)
PMV BLD AUTO: 10.6 FL (ref 8.9–12.7)
POTASSIUM SERPL-SCNC: 6 MMOL/L (ref 3.5–5.3)
PROT SERPL-MCNC: 7.8 G/DL (ref 6.4–8.2)
PROT UR STRIP-MCNC: ABNORMAL MG/DL
RBC # BLD AUTO: 3.94 MILLION/UL (ref 3.81–5.12)
RBC #/AREA URNS AUTO: ABNORMAL /HPF
SODIUM SERPL-SCNC: 139 MMOL/L (ref 136–145)
SP GR UR STRIP.AUTO: >=1.03 (ref 1–1.03)
UROBILINOGEN UR QL STRIP.AUTO: 0.2 E.U./DL
WBC # BLD AUTO: 6.87 THOUSAND/UL (ref 4.31–10.16)
WBC #/AREA URNS AUTO: ABNORMAL /HPF

## 2021-05-05 PROCEDURE — 85025 COMPLETE CBC W/AUTO DIFF WBC: CPT | Performed by: PHYSICIAN ASSISTANT

## 2021-05-05 PROCEDURE — 80053 COMPREHEN METABOLIC PANEL: CPT | Performed by: PHYSICIAN ASSISTANT

## 2021-05-05 PROCEDURE — 74176 CT ABD & PELVIS W/O CONTRAST: CPT

## 2021-05-05 PROCEDURE — 36415 COLL VENOUS BLD VENIPUNCTURE: CPT | Performed by: PHYSICIAN ASSISTANT

## 2021-05-05 PROCEDURE — 99284 EMERGENCY DEPT VISIT MOD MDM: CPT

## 2021-05-05 PROCEDURE — 81001 URINALYSIS AUTO W/SCOPE: CPT | Performed by: PHYSICIAN ASSISTANT

## 2021-05-05 PROCEDURE — 96374 THER/PROPH/DIAG INJ IV PUSH: CPT

## 2021-05-05 PROCEDURE — 99285 EMERGENCY DEPT VISIT HI MDM: CPT | Performed by: PHYSICIAN ASSISTANT

## 2021-05-05 PROCEDURE — G1004 CDSM NDSC: HCPCS

## 2021-05-05 RX ORDER — HYDROMORPHONE HCL/PF 1 MG/ML
0.5 SYRINGE (ML) INJECTION ONCE
Status: COMPLETED | OUTPATIENT
Start: 2021-05-05 | End: 2021-05-05

## 2021-05-05 RX ORDER — LOPERAMIDE HYDROCHLORIDE 2 MG/1
2 CAPSULE ORAL 4 TIMES DAILY PRN
Qty: 12 CAPSULE | Refills: 0 | Status: SHIPPED | OUTPATIENT
Start: 2021-05-05 | End: 2021-12-13 | Stop reason: HOSPADM

## 2021-05-05 RX ADMIN — HYDROMORPHONE HYDROCHLORIDE 0.5 MG: 1 INJECTION, SOLUTION INTRAMUSCULAR; INTRAVENOUS; SUBCUTANEOUS at 19:32

## 2021-05-06 NOTE — ED NOTES
Pt ringing call franz states feels nauseous and had vomiting episode  Emesis was coloration was bile        Radhika Castro RN  05/05/21 2050

## 2021-05-06 NOTE — ED PROVIDER NOTES
History  Chief Complaint   Patient presents with    Pain     Pt was recently admitted and released, continues to have pain in mid back and left side of abdomen  Pt felt nauseous this morning  Multiple episodes of diarrhea     20-year-old female with a past medical history of diabetes mellitus, CKD3, and hypertension presents to the ED for recurrent left-sided abdominal that radiates into the left flank  Patient states she woke up today to onset of pain has been worsening since  Some intermittent nausea with 3 episodes of watery stool  Patient was recently admitted on the hospital and 04/29/2021-05/01/2021 for intractable abdominal pain  She reports complete resolution of symptoms at time of discharge from hospital   During that hospital visit patient had CT without any acute findings, urine micro without evidence of acute cystitis, but did reveal Trichomonas  Patient also had normal stool and C diff culture during that stay  Patient has been using Flagyl b i d  since discharge from hospital   Reports compliance with medication  Patient reports pain to presents today similar to previous episodes she has had in the past   Patient has extensive history of pyelonephritis with hospital admissions  Denies any fever, chills, diaphoresis, vomiting, dysuria, hematuria, vaginal bleeding, vaginal discharge  History provided by:  Patient   used: No        Prior to Admission Medications   Prescriptions Last Dose Informant Patient Reported? Taking?    Alcohol Swabs (ALCOHOL PADS) 70 % PADS  Self No No   Sig: by Does not apply route 4 (four) times a day   Blood Glucose Monitoring Suppl (FREESTYLE LITE) DEIRDRE  Self No No   Sig: by Does not apply route daily   Easy Comfort Lancets MISC   No No   Sig: USE TO TEST THE BLOOD SUGAR THREE TIMES A DAY   FREESTYLE LITE test strip   No No   Sig: USE TO TEST THE BLOOD SUGAR THREE TIMES A DAY   Insulin Pen Needle (PEN NEEDLES) 31G X 8 MM MISC  Self No No   Sig: Inject 1 Stick under the skin 4 (four) times a day (with meals and at bedtime)   Nutritional Supplements (Ensure Original) LIQD   No No   Sig: Take 1 Bottle by mouth 2 (two) times a day   Probiotic Product (PROBIOTIC-10 PO)  Self Yes No   Sig: Take 1 tablet by mouth daily   QUEtiapine (SEROquel) 200 mg tablet   Yes No   Sig: Take 150 mg by mouth daily at bedtime    Toujeo Max SoloStar 300 units/mL CONCETRATED U-300 injection pen (2-unit dial)   No No   Sig: INJECT 22 UNITS UNDER THE SKIN DAILY   albuterol (PROVENTIL HFA,VENTOLIN HFA) 90 mcg/act inhaler   No No   Sig: Inhale 2 puffs every 6 (six) hours as needed for wheezing or shortness of breath   amLODIPine (NORVASC) 5 mg tablet   No No   Sig: TAKE ONE TABLET BY MOUTH ONCE DAILY   amphetamine-dextroamphetamine (ADDERALL XR) 20 MG 24 hr capsule   No No   Sig: Take 1 capsule (20 mg total) by mouth 2 (two) times a dayMax Daily Amount: 40 mg   atorvastatin (LIPITOR) 20 mg tablet   No No   Sig: Take 1 tablet (20 mg total) by mouth daily   buPROPion (WELLBUTRIN XL) 300 mg 24 hr tablet   Yes No   Sig: Take 300 mg by mouth every morning    cycloSPORINE (RESTASIS) 0 05 % ophthalmic emulsion  Self Yes No   Sig: Administer 1 drop to both eyes 2 (two) times a day   dexlansoprazole (Dexilant) 60 MG capsule   No No   Sig: Take 1 capsule (60 mg total) by mouth daily   dicyclomine (BENTYL) 10 mg capsule   Yes No   docusate sodium (COLACE) 100 mg capsule   No No   Sig: Take 1 capsule (100 mg total) by mouth 2 (two) times a day   hydrocortisone 1 % cream   No No   Sig: Apply topically 4 (four) times a day as needed for irritation   insulin aspart (NovoLOG FlexPen) 100 UNIT/ML injection pen   No No   Sig: Inject 6 Units under the skin 3 (three) times a day with meals   lidocaine (XYLOCAINE) 5 % ointment   No No   Sig: APPLY TOPICALLY 2GM TWICE A DAY TO AFFECTED AREAS AS NEEDED FOR MILD PAIN   linaCLOtide (Linzess) 72 MCG CAPS   No No   Sig: Take 72 mcg by mouth daily   metroNIDAZOLE (FLAGYL) 500 mg tablet   No No   Sig: Take 1 tablet (500 mg total) by mouth every 12 (twelve) hours for 13 doses   ondansetron (ZOFRAN) 4 mg tablet   No No   Sig: Take 1 tablet (4 mg total) by mouth every 8 (eight) hours as needed for nausea or vomiting   oxyCODONE-acetaminophen (PERCOCET)  mg per tablet   No No   Sig: Take 1 tablet by mouth every 6 (six) hours as needed for moderate painMax Daily Amount: 4 tablets   pantoprazole (PROTONIX) 40 mg tablet   No No   Sig: Take 1 tablet (40 mg total) by mouth 2 (two) times a day before meals   scopolamine (TRANSDERM-SCOP) 1 5 mg/3 days TD 72 hr patch   No No   Sig: Place 1 patch on the skin every third day      Facility-Administered Medications: None       Past Medical History:   Diagnosis Date    ADHD     Anemia of chronic disease     Anxiety     Asthma     Asthma     Borderline personality disorder (HCC)     Cataplexy     Chronic abdominal pain     CKD (chronic kidney disease) stage 3, GFR 30-59 ml/min (HCC)     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 [address] [facility]     [facility]     Diabetes mellitus [facility]     DVT (deep venous thrombosis) (HCC)     GERD (gastroesophageal reflux disease)     History of acute pancreatitis     felt secondary to Bactrim    History of transfusion     HTN (hypertension)     Hypertension     Liver disease     fatty liver    Microscopic polyangiitis (HCC)     Morbid obesity (HCC)     MPA (microscopic polyangiitis) (HCC)     Ovarian cyst     PTSD (post-traumatic stress disorder)     Renal disorder     Self-inflicted injury     self inflicted skin wounds    [facility]'s granulomatosis with [facility] involvement ([facility] ) 2015       Past Surgical History:   Procedure Laterality Date    COLONOSCOPY      ESOPHAGOGASTRODUODENOSCOPY  09/11/2015    mild antral gastritis    GASTRIC STIMULATOR IMPLANT SURGERY  06/25/2020    MO COLONOSCOPY FLX DX W/COLLJ SPEC WHEN PFRMD N/A 12/14/2018    Procedure: COLONOSCOPY with polypectomy;  Surgeon: Ricardo Dawson Liz SUGGS MD;  Location: [facility]; Service: Gastroenterology    CT ESOPHAGOGASTRODUODENOSCOPY TRANSORAL DIAGNOSTIC N/A 12/14/2018    Procedure: ESOPHAGOGASTRODUODENOSCOPY (EGD) with biopsy;  Surgeon: Levi Mills MD;  Location: [facility]; Service: Gastroenterology    RELEASE SCAR CONTRACTURE / GRAFT REPAIRS OF HAND Bilateral     UPPER GASTROINTESTINAL ENDOSCOPY  12/26/2019       Family History   Problem Relation Age of Onset    No Known Problems Mother     No Known Problems Father     Colon cancer Neg Hx     Drug abuse Neg Hx         mother father    Mental illness Neg Hx         disorder, mother father    Cancer Neg Hx     Breast cancer Neg Hx      I have reviewed and agree with the history as documented  E-Cigarette/Vaping    E-Cigarette Use Never User      E-Cigarette/Vaping Substances    Nicotine No     THC No     CBD No      Social History     Tobacco Use    Smoking status: Former Smoker     Quit date: 2011     Years since quitting: 10 3    Smokeless tobacco: Never Used    Tobacco comment: marijuana   Substance Use Topics    Alcohol use: Never     Frequency: Never     Binge frequency: Never    Drug use: Yes     Types: Marijuana     Comment: marijuana daily       Review of Systems   Constitutional: Negative for chills, diaphoresis, fatigue and fever  HENT: Negative for congestion, rhinorrhea and sore throat  Eyes: Negative for photophobia and visual disturbance  Respiratory: Negative for cough and shortness of breath  Cardiovascular: Negative for chest pain and palpitations  Gastrointestinal: Positive for abdominal pain, diarrhea and nausea  Negative for blood in stool, constipation and vomiting  Genitourinary: Positive for flank pain (left side)  Negative for difficulty urinating, dysuria, frequency, hematuria, vaginal bleeding and vaginal discharge  Musculoskeletal: Negative for back pain, myalgias and neck pain  Skin: Negative for color change and rash  Allergic/Immunologic: Negative for immunocompromised state  Neurological: Negative for dizziness, weakness, light-headedness, numbness and headaches  Hematological: Negative for adenopathy  Does not bruise/bleed easily  Psychiatric/Behavioral: Negative for behavioral problems  Physical Exam  Physical Exam  Vitals signs and nursing note reviewed  Constitutional:       General: She is not in acute distress  Appearance: Normal appearance  She is well-developed and normal weight  She is not ill-appearing, toxic-appearing or diaphoretic  HENT:      Head: Normocephalic and atraumatic  Right Ear: Tympanic membrane, ear canal and external ear normal       Left Ear: Tympanic membrane, ear canal and external ear normal       Nose: Nose normal  No congestion  Mouth/Throat:      Mouth: Mucous membranes are moist       Pharynx: Oropharynx is clear  No oropharyngeal exudate or posterior oropharyngeal erythema  Eyes:      General: No scleral icterus  Right eye: No discharge  Left eye: No discharge  Extraocular Movements: Extraocular movements intact  Conjunctiva/sclera: Conjunctivae normal       Pupils: Pupils are equal, round, and reactive to light  Neck:      Musculoskeletal: Normal range of motion  No neck rigidity or muscular tenderness  Cardiovascular:      Rate and Rhythm: Normal rate and regular rhythm  Pulses: Normal pulses  Heart sounds: Normal heart sounds  No murmur  Pulmonary:      Effort: Pulmonary effort is normal  No respiratory distress  Breath sounds: Normal breath sounds  No wheezing  Abdominal:      General: Bowel sounds are normal  There is no distension  Palpations: Abdomen is soft  There is no mass  Tenderness: There is abdominal tenderness (generalized left side)  There is no right CVA tenderness, left CVA tenderness, guarding or rebound  Musculoskeletal: Normal range of motion           General: No deformity or signs of injury  Lymphadenopathy:      Cervical: No cervical adenopathy  Skin:     General: Skin is warm and dry  Capillary Refill: Capillary refill takes less than 2 seconds  Coloration: Skin is not jaundiced or pale  Neurological:      Mental Status: She is alert and oriented to person, place, and time  Motor: No weakness        Gait: Gait normal    Psychiatric:         Behavior: Behavior normal          Vital Signs  ED Triage Vitals [05/05/21 1643]   Temperature Pulse Respirations Blood Pressure SpO2   98 4 °F (36 9 °C) (!) 107 20 159/86 96 %      Temp Source Heart Rate Source Patient Position - Orthostatic VS BP Location FiO2 (%)   Oral Monitor Sitting Right arm --      Pain Score       Worst Possible Pain           Vitals:    05/05/21 1643 05/05/21 1819 05/05/21 2123   BP: 159/86 147/71 145/80   Pulse: (!) 107 95 92   Patient Position - Orthostatic VS: Sitting Sitting          Visual Acuity      ED Medications  Medications   iohexol (OMNIPAQUE) 350 MG/ML injection (SINGLE-DOSE) 100 mL (has no administration in time range)   HYDROmorphone (DILAUDID) injection 0 5 mg (0 5 mg Intravenous Given 5/5/21 1932)       Diagnostic Studies  Results Reviewed     Procedure Component Value Units Date/Time    Urine Microscopic [[id number]]  (Abnormal) Collected: 05/05/21 2037    Lab Status: Final result Specimen: Urine, Clean Catch Updated: 05/05/21 2107     RBC, UA 0-1 /hpf      WBC, UA 1-2 /hpf      Epithelial Cells Occasional /hpf      Bacteria, UA Occasional /hpf     UA w Reflex to Microscopic w Reflex to Culture [[id number]]  (Abnormal) Collected: 05/05/21 2037    Lab Status: Final result Specimen: Urine, Clean Catch Updated: 05/05/21 2046     Color, UA Yellow     Clarity, UA Clear     Specific Gravity, UA >=1 030     pH, UA 5 5     Leukocytes, UA Trace     Nitrite, UA Negative     Protein,  (2+) mg/dl      Glucose, UA Negative mg/dl      Ketones, UA Negative mg/dl      Urobilinogen, UA 0 2 E U /dl Bilirubin, UA Negative     Blood, UA Trace-Intact    Comprehensive metabolic panel [[id number]]  (Abnormal) Collected: 05/05/21 1933    Lab Status: Final result Specimen: Blood from Hand, Right Updated: 05/05/21 2006     Sodium 139 mmol/L      Potassium 6 0 mmol/L      Chloride 108 mmol/L      CO2 20 mmol/L      ANION GAP 11 mmol/L      BUN 30 mg/dL      Creatinine 2 07 mg/dL      Glucose 121 mg/dL      Calcium 8 9 mg/dL      AST 70 U/L      ALT 22 U/L      Alkaline Phosphatase 134 U/L      Total Protein 7 8 g/dL      Albumin 3 6 g/dL      Total Bilirubin 0 45 mg/dL      eGFR 27 ml/min/1 73sq m     Narrative:      National Kidney Disease Foundation guidelines for Chronic Kidney Disease (CKD):     Stage 1 with normal or high GFR (GFR > 90 mL/min/1 73 square meters)    Stage 2 Mild CKD (GFR = 60-89 mL/min/1 73 square meters)    Stage 3A Moderate CKD (GFR = 45-59 mL/min/1 73 square meters)    Stage 3B Moderate CKD (GFR = 30-44 mL/min/1 73 square meters)    Stage 4 Severe CKD (GFR = 15-29 mL/min/1 73 square meters)    Stage 5 End Stage CKD (GFR <15 mL/min/1 73 square meters)  Note: GFR calculation is accurate only with a steady state creatinine    CBC and differential [[id number]] Collected: 05/05/21 1933    Lab Status: Final result Specimen: Blood from Hand, Right Updated: 05/05/21 1940     WBC 6 87 Thousand/uL      RBC 3 94 Million/uL      Hemoglobin 12 1 g/dL      Hematocrit 36 5 %      MCV 93 fL      MCH 30 7 pg      MCHC 33 2 g/dL      RDW 13 2 %      MPV 10 6 fL      Platelets 922 Thousands/uL      nRBC 0 /100 WBCs      Neutrophils Relative 67 %      Immat GRANS % 0 %      Lymphocytes Relative 23 %      Monocytes Relative 10 %      Eosinophils Relative 0 %      Basophils Relative 0 %      Neutrophils Absolute 4 53 Thousands/µL      Immature Grans Absolute 0 02 Thousand/uL      Lymphocytes Absolute 1 58 Thousands/µL      Monocytes Absolute 0 69 Thousand/µL      Eosinophils Absolute 0 03 Thousand/µL Basophils Absolute 0 02 Thousands/µL                  CT renal stone study abdomen pelvis wo contrast   Final Result by Dawna Bianchi MD (05/05 2001)      No hydronephrosis or nephrolithiasis   Sigmoid diverticulosis  Workstation performed: [id number]                    Procedures  Procedures         ED Course                             SBIRT 20yo+      Most Recent Value   SBIRT (22 yo +)   In order to provide better care to our patients, we are screening all of our patients for alcohol and drug use  Would it be okay to ask you these screening questions? Unable to answer at this time Filed at: 05/05/2021 1937                    [facility]  Number of Diagnoses or Management Options  Chronic abdominal pain: new and requires workup  Diagnosis management comments: 24-year-old female presents to the ED for evaluation of recurrent left-sided abdominal pain and left flank pain x1 day  Patient was provided IV Dilaudid on arrival with significant improvement symptoms  Labs noncontributory  UA unremarkable for acute bacterial infection  On recent hospital admission patient noted to have Trichomonas infection  Currently on Flagyl b i d   Advised to continue medication as directed  CT renal without contrast noncontributory  Patient advised to follow-up with nephrology  Patient was adamant about requested hospital admission, however discussed with patient that there are no significant findings and considering patient's recent multiple hospital admissions for nonspecific pyelonephritis, this is not clinically indicated at this time  Patient stable at discharge         Amount and/or Complexity of Data Reviewed  Clinical lab tests: ordered and reviewed  Tests in the radiology section of CPT®: ordered and reviewed  Review and summarize past medical records: yes  Discuss the patient with other providers: yes  Independent visualization of images, tracings, or specimens: yes    Risk of Complications, Morbidity, and/or Mortality  Presenting problems: moderate  Diagnostic procedures: moderate  Management options: moderate    Patient Progress  Patient progress: stable      Disposition  Final diagnoses:   Chronic abdominal pain     Time reflects when diagnosis was documented in both MDM as applicable and the Disposition within this note     Time User Action Codes Description Comment    2021  9:24 PM Karen Graham Add [R10 9,  G89 29] Chronic abdominal pain       ED Disposition     ED Disposition Condition Date/Time Comment    Discharge Stable Wed May 5, 2021  9:23 PM [place]  discharge to home/self care  Follow-up Information     Follow up With Specialties Details Why Contact Info Additional Information    [facility]   [address]  [facility] [address] [facility] [address]  [address] [place] [address] Nephrology [facility] [address], [address] [facility]          Patient's Medications   Discharge Prescriptions    No medications on file     No discharge procedures on file      PDMP Review       Value Time User    PDMP Reviewed  Yes 2021 11:46 PM Nya Callaway          ED Provider  Electronically Signed by           Abhijit Tavera PA-C  [phone]

## 2021-05-07 DIAGNOSIS — R21 RASH: ICD-10-CM

## 2021-05-07 DIAGNOSIS — M31.31 GRANULOMATOSIS WITH POLYANGIITIS WITH RENAL INVOLVEMENT (HCC): Chronic | ICD-10-CM

## 2021-05-07 DIAGNOSIS — J45.20 MILD INTERMITTENT ASTHMA WITHOUT COMPLICATION: Chronic | ICD-10-CM

## 2021-05-07 DIAGNOSIS — G89.4 CHRONIC PAIN SYNDROME: ICD-10-CM

## 2021-05-09 ENCOUNTER — HOSPITAL ENCOUNTER (EMERGENCY)
Facility: HOSPITAL | Age: 51
Discharge: HOME | End: 2021-05-09
Payer: COMMERCIAL

## 2021-05-09 VITALS
HEART RATE: 92 BPM | RESPIRATION RATE: 16 BRPM | OXYGEN SATURATION: 100 % | SYSTOLIC BLOOD PRESSURE: 146 MMHG | TEMPERATURE: 98.5 F | DIASTOLIC BLOOD PRESSURE: 73 MMHG

## 2021-05-09 DIAGNOSIS — R11.2 NAUSEA AND VOMITING: ICD-10-CM

## 2021-05-09 DIAGNOSIS — R31.9 HEMATURIA: ICD-10-CM

## 2021-05-09 DIAGNOSIS — N89.8 VAGINAL DISCHARGE: ICD-10-CM

## 2021-05-09 DIAGNOSIS — R19.7 DIARRHEA: ICD-10-CM

## 2021-05-09 DIAGNOSIS — E87.1 HYPONATREMIA: ICD-10-CM

## 2021-05-09 DIAGNOSIS — R10.9 ABDOMINAL PAIN: Primary | ICD-10-CM

## 2021-05-09 LAB
ALBUMIN SERPL BCP-MCNC: 3.9 G/DL (ref 3.5–5)
ALP SERPL-CCNC: 129 U/L (ref 46–116)
ALT SERPL W P-5'-P-CCNC: 25 U/L (ref 12–78)
ANION GAP SERPL CALCULATED.3IONS-SCNC: 4 MMOL/L (ref 4–13)
AST SERPL W P-5'-P-CCNC: 21 U/L (ref 5–45)
BACTERIA UR QL AUTO: ABNORMAL /HPF
BASOPHILS # BLD AUTO: 0.02 THOUSANDS/ΜL (ref 0–0.1)
BASOPHILS NFR BLD AUTO: 0 % (ref 0–1)
BILIRUB SERPL-MCNC: 0.19 MG/DL (ref 0.2–1)
BILIRUB UR QL STRIP: NEGATIVE
BUN SERPL-MCNC: 31 MG/DL (ref 5–25)
CALCIUM SERPL-MCNC: 9 MG/DL (ref 8.3–10.1)
CHLORIDE SERPL-SCNC: 107 MMOL/L (ref 100–108)
CLARITY UR: CLEAR
CO2 SERPL-SCNC: 24 MMOL/L (ref 21–32)
COLOR UR: YELLOW
CREAT SERPL-MCNC: 2.32 MG/DL (ref 0.6–1.3)
EOSINOPHIL # BLD AUTO: 0.02 THOUSAND/ΜL (ref 0–0.61)
EOSINOPHIL NFR BLD AUTO: 0 % (ref 0–6)
ERYTHROCYTE [DISTWIDTH] IN BLOOD BY AUTOMATED COUNT: 13.1 % (ref 11.6–15.1)
GFR SERPL CREATININE-BSD FRML MDRD: 24 ML/MIN/1.73SQ M
GLUCOSE SERPL-MCNC: 111 MG/DL (ref 65–140)
GLUCOSE UR STRIP-MCNC: NEGATIVE MG/DL
HCT VFR BLD AUTO: 38.5 % (ref 34.8–46.1)
HGB BLD-MCNC: 12.7 G/DL (ref 11.5–15.4)
HGB UR QL STRIP.AUTO: ABNORMAL
IMM GRANULOCYTES # BLD AUTO: 0.02 THOUSAND/UL (ref 0–0.2)
IMM GRANULOCYTES NFR BLD AUTO: 0 % (ref 0–2)
KETONES UR STRIP-MCNC: NEGATIVE MG/DL
LEUKOCYTE ESTERASE UR QL STRIP: NEGATIVE
LIPASE SERPL-CCNC: 268 U/L (ref 73–393)
LYMPHOCYTES # BLD AUTO: 1.01 THOUSANDS/ΜL (ref 0.6–4.47)
LYMPHOCYTES NFR BLD AUTO: 16 % (ref 14–44)
MCH RBC QN AUTO: 30.8 PG (ref 26.8–34.3)
MCHC RBC AUTO-ENTMCNC: 33 G/DL (ref 31.4–37.4)
MCV RBC AUTO: 93 FL (ref 82–98)
MONOCYTES # BLD AUTO: 0.49 THOUSAND/ΜL (ref 0.17–1.22)
MONOCYTES NFR BLD AUTO: 8 % (ref 4–12)
NEUTROPHILS # BLD AUTO: 4.83 THOUSANDS/ΜL (ref 1.85–7.62)
NEUTS SEG NFR BLD AUTO: 76 % (ref 43–75)
NITRITE UR QL STRIP: NEGATIVE
NON-SQ EPI CELLS URNS QL MICRO: ABNORMAL /HPF
NRBC BLD AUTO-RTO: 0 /100 WBCS
PH UR STRIP.AUTO: 5.5 [PH] (ref 4.5–8)
PLATELET # BLD AUTO: 278 THOUSANDS/UL (ref 149–390)
PMV BLD AUTO: 9.9 FL (ref 8.9–12.7)
POTASSIUM SERPL-SCNC: 4.2 MMOL/L (ref 3.5–5.3)
PROT SERPL-MCNC: 7.9 G/DL (ref 6.4–8.2)
PROT UR STRIP-MCNC: ABNORMAL MG/DL
RBC # BLD AUTO: 4.13 MILLION/UL (ref 3.81–5.12)
RBC #/AREA URNS AUTO: ABNORMAL /HPF
SODIUM SERPL-SCNC: 135 MMOL/L (ref 136–145)
SP GR UR STRIP.AUTO: >=1.03 (ref 1–1.03)
UROBILINOGEN UR QL STRIP.AUTO: 0.2 E.U./DL
WBC # BLD AUTO: 6.39 THOUSAND/UL (ref 4.31–10.16)
WBC #/AREA URNS AUTO: ABNORMAL /HPF

## 2021-05-09 PROCEDURE — C9113 INJ PANTOPRAZOLE SODIUM, VIA: HCPCS | Performed by: PHYSICIAN ASSISTANT

## 2021-05-09 PROCEDURE — 87660 TRICHOMONAS VAGIN DIR PROBE: CPT | Performed by: PHYSICIAN ASSISTANT

## 2021-05-09 PROCEDURE — 96374 THER/PROPH/DIAG INJ IV PUSH: CPT

## 2021-05-09 PROCEDURE — 80053 COMPREHEN METABOLIC PANEL: CPT | Performed by: PHYSICIAN ASSISTANT

## 2021-05-09 PROCEDURE — 83690 ASSAY OF LIPASE: CPT | Performed by: PHYSICIAN ASSISTANT

## 2021-05-09 PROCEDURE — 81001 URINALYSIS AUTO W/SCOPE: CPT

## 2021-05-09 PROCEDURE — 87510 GARDNER VAG DNA DIR PROBE: CPT | Performed by: PHYSICIAN ASSISTANT

## 2021-05-09 PROCEDURE — 99284 EMERGENCY DEPT VISIT MOD MDM: CPT | Performed by: PHYSICIAN ASSISTANT

## 2021-05-09 PROCEDURE — 96375 TX/PRO/DX INJ NEW DRUG ADDON: CPT

## 2021-05-09 PROCEDURE — 36415 COLL VENOUS BLD VENIPUNCTURE: CPT | Performed by: PHYSICIAN ASSISTANT

## 2021-05-09 PROCEDURE — 87480 CANDIDA DNA DIR PROBE: CPT | Performed by: PHYSICIAN ASSISTANT

## 2021-05-09 PROCEDURE — 99284 EMERGENCY DEPT VISIT MOD MDM: CPT

## 2021-05-09 PROCEDURE — 85025 COMPLETE CBC W/AUTO DIFF WBC: CPT | Performed by: PHYSICIAN ASSISTANT

## 2021-05-09 RX ORDER — OXYCODONE HYDROCHLORIDE AND ACETAMINOPHEN 5; 325 MG/1; MG/1
1 TABLET ORAL ONCE
Status: DISCONTINUED | OUTPATIENT
Start: 2021-05-09 | End: 2021-05-09 | Stop reason: HOSPADM

## 2021-05-09 RX ORDER — ONDANSETRON 2 MG/ML
4 INJECTION INTRAMUSCULAR; INTRAVENOUS ONCE
Status: COMPLETED | OUTPATIENT
Start: 2021-05-09 | End: 2021-05-09

## 2021-05-09 RX ORDER — PROMETHAZINE HYDROCHLORIDE 25 MG/1
25 TABLET ORAL EVERY 6 HOURS PRN
Qty: 8 TABLET | Refills: 0 | Status: SHIPPED | OUTPATIENT
Start: 2021-05-09 | End: 2021-05-10 | Stop reason: SDUPTHER

## 2021-05-09 RX ORDER — DICYCLOMINE HCL 20 MG
20 TABLET ORAL ONCE
Status: COMPLETED | OUTPATIENT
Start: 2021-05-09 | End: 2021-05-09

## 2021-05-09 RX ORDER — DICYCLOMINE HCL 20 MG
20 TABLET ORAL EVERY 6 HOURS
Qty: 30 TABLET | Refills: 0 | Status: SHIPPED | OUTPATIENT
Start: 2021-05-09 | End: 2021-09-29 | Stop reason: ALTCHOICE

## 2021-05-09 RX ORDER — SUCRALFATE ORAL 1 G/10ML
1 SUSPENSION ORAL 4 TIMES DAILY
Qty: 420 ML | Refills: 0 | Status: SHIPPED | OUTPATIENT
Start: 2021-05-09 | End: 2022-01-26 | Stop reason: ALTCHOICE

## 2021-05-09 RX ORDER — PANTOPRAZOLE SODIUM 40 MG/1
40 INJECTION, POWDER, FOR SOLUTION INTRAVENOUS ONCE
Status: COMPLETED | OUTPATIENT
Start: 2021-05-09 | End: 2021-05-09

## 2021-05-09 RX ADMIN — DICYCLOMINE HYDROCHLORIDE 20 MG: 20 TABLET ORAL at 08:11

## 2021-05-09 RX ADMIN — ONDANSETRON 4 MG: 2 INJECTION INTRAMUSCULAR; INTRAVENOUS at 08:11

## 2021-05-09 RX ADMIN — PANTOPRAZOLE SODIUM 40 MG: 40 INJECTION, POWDER, FOR SOLUTION INTRAVENOUS at 08:11

## 2021-05-09 NOTE — ED PROVIDER NOTES
History  Chief Complaint   Patient presents with    Abdominal Pain     Abdominal pain and left flank, nausea, vomiting, diarrhea, weight loss x 3 weeks  Reports seen here for same and not improving  States she just finished abx 2 days ago  Has not followed up yet outpatient       50y  o female with PMH of ADHD, anemia, anxiety, asthma, borderline personality disorder, chronic abdominal pain, CKD, Cushing disease, DM, DVT, GERD, pancreatitis, HTN, polyangiitis, ovarian cyst, PTSD and wegener's granulomatosis presents to the ER for for evaluation  Patient states she has been having ongoing diffuse abdominal pain, nausea, vomiting and vaginal itching  Patient has had multiple ER visits and admissions for symptoms  Her last admission was 4/29 and at that time, she was instructed to continue taking Flagyl for Trichomonas and follow up with her pcp  At her time of discharge, her symptoms had greatly improved  Patient states since returning home and finishing her Flagyl, she feels as though symptoms have returned  She denies taking any other medications other then her daily medications  Symptoms have been constant  Associated symptoms: back pain but she thinks this may be related to the fact that she has been hunched over from abdominal pain and non-bloody diarrhea  She denies fever, chills, URI symptoms, current chest pain, dyspnea, frequency, urgency, hematuria, dysuria, weakness or paresthesias  Patient has followed with her gynecologist for vaginal symptoms but states the medication they gave her did not work  She has not followed up with them since  She also has not followed with GI since February  History provided by:  Patient   used: No      Wt Readings from Last 3 Encounters:   04/30/21 73 1 kg (161 lb 2 5 oz)   04/26/21 76 8 kg (169 lb 5 oz)   04/26/21 79 4 kg (175 lb)       Prior to Admission Medications   Prescriptions Last Dose Informant Patient Reported? Taking?    Alcohol Swabs (ALCOHOL PADS) 70 % PADS  Self No Yes   Sig: by Does not apply route 4 (four) times a day   Blood Glucose Monitoring Suppl (FREESTYLE LITE) DEIRDRE  Self No Yes   Sig: by Does not apply route daily   Easy Comfort Lancets MISC   No Yes   Sig: USE TO TEST THE BLOOD SUGAR THREE TIMES A DAY   FREESTYLE LITE test strip   No Yes   Sig: USE TO TEST THE BLOOD SUGAR THREE TIMES A DAY   Insulin Pen Needle (PEN NEEDLES) 31G X 8 MM MISC  Self No Yes   Sig: Inject 1 Stick under the skin 4 (four) times a day (with meals and at bedtime)   Nutritional Supplements (Ensure Original) LIQD   No Yes   Sig: Take 1 Bottle by mouth 2 (two) times a day   Probiotic Product (PROBIOTIC-10 PO)  Self Yes Yes   Sig: Take 1 tablet by mouth daily   QUEtiapine (SEROquel) 200 mg tablet   Yes Yes   Sig: Take 150 mg by mouth daily at bedtime    Toujeo Max SoloStar 300 units/mL CONCETRATED U-300 injection pen (2-unit dial)   No Yes   Sig: INJECT 22 UNITS UNDER THE SKIN DAILY   albuterol (PROVENTIL HFA,VENTOLIN HFA) 90 mcg/act inhaler   No Yes   Sig: Inhale 2 puffs every 6 (six) hours as needed for wheezing or shortness of breath   amLODIPine (NORVASC) 5 mg tablet   No Yes   Sig: TAKE ONE TABLET BY MOUTH ONCE DAILY   amphetamine-dextroamphetamine (ADDERALL XR) 20 MG 24 hr capsule   No Yes   Sig: Take 1 capsule (20 mg total) by mouth 2 (two) times a dayMax Daily Amount: 40 mg   atorvastatin (LIPITOR) 20 mg tablet   No Yes   Sig: Take 1 tablet (20 mg total) by mouth daily   buPROPion (WELLBUTRIN XL) 300 mg 24 hr tablet   Yes Yes   Sig: Take 300 mg by mouth every morning    cycloSPORINE (RESTASIS) 0 05 % ophthalmic emulsion  Self Yes No   Sig: Administer 1 drop to both eyes 2 (two) times a day   dexlansoprazole (Dexilant) 60 MG capsule   No Yes   Sig: Take 1 capsule (60 mg total) by mouth daily   dicyclomine (BENTYL) 10 mg capsule   Yes Yes   docusate sodium (COLACE) 100 mg capsule   No Yes   Sig: Take 1 capsule (100 mg total) by mouth 2 (two) times a day hydrocortisone 1 % cream   No Yes   Sig: Apply topically 4 (four) times a day as needed for irritation   insulin aspart (NovoLOG FlexPen) 100 UNIT/ML injection pen   No Yes   Sig: Inject 6 Units under the skin 3 (three) times a day with meals   lidocaine (XYLOCAINE) 5 % ointment   No No   Sig: APPLY TOPICALLY 2GM TWICE A DAY TO AFFECTED AREAS AS NEEDED FOR MILD PAIN   linaCLOtide (Linzess) 72 MCG CAPS   No Yes   Sig: Take 72 mcg by mouth daily   loperamide (IMODIUM) 2 mg capsule   No Yes   Sig: Take 1 capsule (2 mg total) by mouth 4 (four) times a day as needed for diarrhea   metroNIDAZOLE (FLAGYL) 500 mg tablet   No Yes   Sig: Take 1 tablet (500 mg total) by mouth every 12 (twelve) hours for 13 doses   ondansetron (ZOFRAN) 4 mg tablet   No Yes   Sig: Take 1 tablet (4 mg total) by mouth every 8 (eight) hours as needed for nausea or vomiting   oxyCODONE-acetaminophen (PERCOCET)  mg per tablet   No Yes   Sig: Take 1 tablet by mouth every 6 (six) hours as needed for moderate painMax Daily Amount: 4 tablets   pantoprazole (PROTONIX) 40 mg tablet   No Yes   Sig: Take 1 tablet (40 mg total) by mouth 2 (two) times a day before meals   scopolamine (TRANSDERM-SCOP) 1 5 mg/3 days TD 72 hr patch   No Yes   Sig: Place 1 patch on the skin every third day      Facility-Administered Medications: None       Past Medical History:   Diagnosis Date    ADHD     Anemia of chronic disease     Anxiety     Asthma     Asthma     Borderline personality disorder (HCC)     Cataplexy     Chronic abdominal pain     CKD (chronic kidney disease) stage 3, GFR 30-59 ml/min (HCC)     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HCC)     [facility]     [facility]     DVT (deep venous thrombosis) (HCC)     GERD (gastroesophageal reflux disease)     History of acute pancreatitis     felt secondary to Bactrim    History of transfusion     HTN (hypertension)     Hypertension     Liver disease     fatty liver    Microscopic polyangiitis ([facility] )     Morbid obesity ([facility] )     MPA (microscopic polyangiitis) (HCC)     Ovarian cyst     PTSD (post-traumatic stress disorder)     Renal disorder     Self-inflicted injury     self inflicted skin wounds    Wegener's granulomatosis with renal involvement ([facility] ) 2015       Past Surgical History:   Procedure Laterality Date    COLONOSCOPY      ESOPHAGOGASTRODUODENOSCOPY  09/11/2015    mild antral gastritis    GASTRIC STIMULATOR IMPLANT SURGERY  06/25/2020    MI COLONOSCOPY FLX DX W/COLLJ SPEC WHEN PFRMD N/A 12/14/2018    Procedure: COLONOSCOPY with polypectomy;  Surgeon: Raquel Florian MD;  Location: [facility]; Service: Gastroenterology    MI ESOPHAGOGASTRODUODENOSCOPY TRANSORAL DIAGNOSTIC N/A 12/14/2018    Procedure: ESOPHAGOGASTRODUODENOSCOPY (EGD) with biopsy;  Surgeon: Raquel Florian MD;  Location: [facility]; Service: Gastroenterology    RELEASE SCAR CONTRACTURE / GRAFT REPAIRS OF HAND Bilateral     UPPER GASTROINTESTINAL ENDOSCOPY  12/26/2019       Family History   Problem Relation Age of Onset    No Known Problems Mother     No Known Problems Father     Colon cancer Neg Hx     Drug abuse Neg Hx         mother father    Mental illness Neg Hx         disorder, mother father    Cancer Neg Hx     Breast cancer Neg Hx      I have reviewed and agree with the history as documented  E-Cigarette/Vaping    E-Cigarette Use Never User      E-Cigarette/Vaping Substances    Nicotine No     THC No     CBD No      Social History     Tobacco Use    Smoking status: Former Smoker     Quit date: 2011     Years since quitting: 10 3    Smokeless tobacco: Never Used    Tobacco comment: marijuana   Substance Use Topics    Alcohol use: Never     Frequency: Never     Binge frequency: Never    Drug use: Yes     Types: Marijuana     Comment: marijuana daily       Review of Systems   Constitutional: Negative for activity change, appetite change, chills and fever     HENT: Negative for congestion, drooling, ear discharge, ear pain, facial swelling, rhinorrhea and sore throat  Eyes: Negative for redness  Respiratory: Negative for cough and shortness of breath  Cardiovascular: Negative for chest pain  Gastrointestinal: Positive for abdominal pain, diarrhea, nausea and vomiting  Negative for blood in stool  Genitourinary: Positive for vaginal discharge (ongoing; recently treated for trichomonas)  Negative for dysuria, frequency, hematuria and urgency  Musculoskeletal: Positive for back pain  Negative for neck stiffness  Skin: Negative for rash  Allergic/Immunologic: Negative for food allergies  Neurological: Negative for weakness and numbness  Physical Exam  Physical Exam  Vitals signs and nursing note reviewed  Exam conducted with a chaperone present [facility] RN present)  Constitutional:       General: She is not in acute distress  Appearance: She is not toxic-appearing  HENT:      Head: Normocephalic and atraumatic  Eyes:      Conjunctiva/sclera: Conjunctivae normal    Neck:      Musculoskeletal: Normal range of motion and neck supple  Trachea: No tracheal deviation  Cardiovascular:      Rate and Rhythm: Normal rate and regular rhythm  Heart sounds: Normal heart sounds, S1 normal and S2 normal  No murmur  No friction rub  No gallop  Pulmonary:      Effort: Pulmonary effort is normal  No respiratory distress  Breath sounds: Normal breath sounds  No decreased breath sounds, wheezing, rhonchi or rales  Chest:      Chest wall: No tenderness  Abdominal:      General: Bowel sounds are normal  There is no distension  Palpations: Abdomen is soft  Tenderness: There is generalized abdominal tenderness  There is no guarding or rebound  Genitourinary:     Exam position: Supine  Pubic Area: No rash or pubic lice  Labia:         Right: No rash, tenderness, lesion or injury  Left: No rash, tenderness, lesion or injury  Comments: No discharge seen from the outside of the vagina  Skin:     General: Skin is warm and dry  Findings: No rash  Neurological:      Mental Status: She is alert  GCS: GCS eye subscore is 4  GCS verbal subscore is 5  GCS motor subscore is 6           Vital Signs  ED Triage Vitals [05/09/21 0717]   Temperature Pulse Respirations Blood Pressure SpO2   98 5 °F (36 9 °C) 92 16 (!) 156/110 100 %      Temp Source Heart Rate Source Patient Position - Orthostatic VS BP Location FiO2 (%)   Oral -- -- -- --      Pain Score       7           Vitals:    05/09/21 0717 05/09/21 0824 05/09/21 0830   BP: (!) 156/110 146/73 146/73   Pulse: 92           Visual Acuity      ED Medications  Medications   oxyCODONE-acetaminophen (PERCOCET) 5-325 mg per tablet 1 tablet (0 tablets Oral Hold 5/9/21 0958)   pantoprazole (PROTONIX) injection 40 mg (40 mg Intravenous Given 5/9/21 0811)   ondansetron (ZOFRAN) injection 4 mg (4 mg Intravenous Given 5/9/21 0811)   dicyclomine (BENTYL) tablet 20 mg (20 mg Oral Given 5/9/21 0811)       Diagnostic Studies  Results Reviewed     Procedure Component Value Units Date/Time    Urine Microscopic [[id number]]  (Abnormal) Collected: 05/09/21 0858    Lab Status: Final result Specimen: Urine, Other Updated: 05/09/21 0935     RBC, UA None Seen /hpf      WBC, UA 0-1 /hpf      Epithelial Cells Occasional /hpf      Bacteria, UA Moderate /hpf     Urine Macroscopic, POC [[id number]]  (Abnormal) Collected: 05/09/21 0858    Lab Status: Final result Specimen: Urine Updated: 05/09/21 0859     Color, UA Yellow     Clarity, UA Clear     pH, UA 5 5     Leukocytes, UA Negative     Nitrite, UA Negative     Protein,  (2+) mg/dl      Glucose, UA Negative mg/dl      Ketones, UA Negative mg/dl      Urobilinogen, UA 0 2 E U /dl      Bilirubin, UA Negative     Blood, UA Trace     Specific Gravity, UA >=1 030    Narrative:      CLINITEK RESULT    VAGINOSIS DNA PROBE (AFFIRM) [[id number]] Collected: 05/09/21 0827 Lab Status:  In process Specimen: Genital from Vaginal Updated: 05/09/21 0830    Comprehensive metabolic panel [[id number]]  (Abnormal) Collected: 05/09/21 0759    Lab Status: Final result Specimen: Blood from Arm, Right Updated: 05/09/21 0825     Sodium 135 mmol/L      Potassium 4 2 mmol/L      Chloride 107 mmol/L      CO2 24 mmol/L      ANION GAP 4 mmol/L      BUN 31 mg/dL      Creatinine 2 32 mg/dL      Glucose 111 mg/dL      Calcium 9 0 mg/dL      AST 21 U/L      ALT 25 U/L      Alkaline Phosphatase 129 U/L      Total Protein 7 9 g/dL      Albumin 3 9 g/dL      Total Bilirubin 0 19 mg/dL      eGFR 24 ml/min/1 73sq m     Narrative:      National Kidney Disease Foundation guidelines for Chronic Kidney Disease (CKD):     Stage 1 with normal or high GFR (GFR > 90 mL/min/1 73 square meters)    Stage 2 Mild CKD (GFR = 60-89 mL/min/1 73 square meters)    Stage 3A Moderate CKD (GFR = 45-59 mL/min/1 73 square meters)    Stage 3B Moderate CKD (GFR = 30-44 mL/min/1 73 square meters)    Stage 4 Severe CKD (GFR = 15-29 mL/min/1 73 square meters)    Stage 5 End Stage CKD (GFR <15 mL/min/1 73 square meters)  Note: GFR calculation is accurate only with a steady state creatinine    Lipase [[id number]]  (Normal) Collected: 05/09/21 0759    Lab Status: Final result Specimen: Blood from Arm, Right Updated: 05/09/21 0821     Lipase 268 u/L     CBC and differential [[id number]]  (Abnormal) Collected: 05/09/21 0759    Lab Status: Final result Specimen: Blood from Arm, Right Updated: 05/09/21 0809     WBC 6 39 Thousand/uL      RBC 4 13 Million/uL      Hemoglobin 12 7 g/dL      Hematocrit 38 5 %      MCV 93 fL      MCH 30 8 pg      MCHC 33 0 g/dL      RDW 13 1 %      MPV 9 9 fL      Platelets 403 Thousands/uL      nRBC 0 /100 WBCs      Neutrophils Relative 76 %      Immat GRANS % 0 %      Lymphocytes Relative 16 %      Monocytes Relative 8 %      Eosinophils Relative 0 %      Basophils Relative 0 %      Neutrophils Absolute 4 83 Thousands/µL      Immature Grans Absolute 0 02 Thousand/uL      Lymphocytes Absolute 1 01 Thousands/µL      Monocytes Absolute 0 49 Thousand/µL      Eosinophils Absolute 0 02 Thousand/µL      Basophils Absolute 0 02 Thousands/µL                  No orders to display              Procedures  Procedures         ED Course  ED Course as of May 09 1047   Sun May 09, 2021   0811 CBC and differential(!)   0827 Per SLIM note on 4/29, patient's baseline Creatinine is 1 5-2 but after reviewing patient's chart, her creatinine seems to jump from 1 5-2 7  Creatinine(!): 2 32                             SBIRT 22yo+      Most Recent Value   SBIRT (24 yo +)   In order to provide better care to our patients, we are screening all of our patients for alcohol and drug use  Would it be okay to ask you these screening questions? No Filed at: 05/09/2021 0720                    MDM  Number of Diagnoses or Management Options  Abdominal pain: new and requires workup  Diarrhea: new and requires workup  Hematuria: new and requires workup  Hyponatremia: new and requires workup  Nausea and vomiting: new and requires workup  Vaginal discharge: new and requires workup  Diagnosis management comments: DDX consists of but not limited to: IBS, gastritis, vaginal infection, kidney injury    Will check labs  Will start with Protonix, Zofran and Bentyl  0816 CBC and differential(!)    0827 Per SLIM note on 4/29, patient's baseline Creatinine is 1 5-2 but after reviewing patient's chart, her creatinine seems to jump from 1 5-2 7  Creatinine(!): 2 32    Patient reporting some improvement in abdominal symptoms after medication given  Informed patient of lab findings  Will discharge patient with outpatient follow up  Patient requesting to be discharged home with other medication for nausea/vomiting other then Zofran  Will give Phenergan  The management plan was discussed in detail with the patient at bedside and all questions were answered  Prior to discharge, we provided both verbal and written instructions  We discussed with the patient the signs and symptoms for which to return to the emergency department  All questions were answered and patient was comfortable with the plan of care and discharged to home  Instructed the patient to follow up with the primary care provider and/or specialist provided and their written instructions  The patient verbalized understanding of our discussion and plan of care, and agrees to return to the Emergency Department for concerns and progression of illness  At discharge, I instructed the patient to:  -follow up with pcp  -take Phenergan, Bentyl and Carafate as prescribed  -rest and drink plenty of fluids  -follow up with OBGYN  -follow up with GI  -return to the ER if symptoms worsened or new symptoms arose  Patient agreed to this plan and was stable at time of discharge  Disposition  Final diagnoses:   Abdominal pain   Nausea and vomiting   Diarrhea   Vaginal discharge   Hematuria   Hyponatremia     Time reflects when diagnosis was documented in both MDM as applicable and the Disposition within this note     Time User Action Codes Description Comment    5/9/2021  9:57 AM Ramonia Knock A Add [R10 9] Abdominal pain     5/9/2021  9:57 AM Ramonia Knock A Add [R11 2] Nausea and vomiting     5/9/2021  9:57 AM Ramonia Knock A Add [R19 7] Diarrhea     5/9/2021  9:57 AM Ramonia Knock A Add [N89 8] Vaginal discharge     5/9/2021  9:57 AM Ramonia Knock A Add [R31 9] Hematuria     5/9/2021  9:57 AM Ramonia Knock A Add [E87 1] Hyponatremia       ED Disposition     ED Disposition Condition Date/Time Comment    Discharge Stable Sun May 9, 2021  9:57 AM Mayra Negro discharge to home/self care              Follow-up Information     Follow up With Specialties Details Why Contact Info Additional Information    [facility] Schedule an appointment as soon as possible for a visit   21 [phone] [address] [facility] Schedule an appointment as soon as possible for a visit  with obgyn [address], [address] [facility] [id number]  [address], [address], [address] [facility], [address] [facility], [address]    [facility] Schedule an appointment as soon as possible for a visit   [address] [facility] [address] [facility] [id number]  Ana Lilia Holbrook [address] [facility], [address] [address], [address], [facility], [address] [facility], [address]          Discharge Medication List as of 5/9/2021 10:02 AM      START taking these medications    Details   dicyclomine (BENTYL) 20 mg tablet Take 1 tablet (20 mg total) by mouth every 6 (six) hours, Starting Sun 5/9/2021, Normal      promethazine (PHENERGAN) 25 mg tablet Take 1 tablet (25 mg total) by mouth every 6 (six) hours as needed for nausea or vomiting, Starting Sun 5/9/2021, Normal      sucralfate (CARAFATE) 1 g/10 mL suspension Take 10 mL (1 g total) by mouth 4 (four) times a day, Starting Sun 5/9/2021, Normal         CONTINUE these medications which have NOT CHANGED    Details   albuterol (PROVENTIL HFA,VENTOLIN HFA) 90 mcg/act inhaler Inhale 2 puffs every 6 (six) hours as needed for wheezing or shortness of breath, Starting Wed 3/17/2021, Normal      Alcohol Swabs (ALCOHOL PADS) 70 % PADS by Does not apply route 4 (four) times a day, Starting Fri 6/7/2019, Normal      amLODIPine (NORVASC) 5 mg tablet TAKE ONE TABLET BY MOUTH ONCE DAILY, Normal      amphetamine-dextroamphetamine (ADDERALL XR) 20 MG 24 hr capsule Take 1 capsule (20 mg total) by mouth 2 (two) times a dayMax Daily Amount: 40 mg, Starting Mon 4/19/2021, Normal      atorvastatin (LIPITOR) 20 mg tablet Take 1 tablet (20 mg total) by mouth daily, Starting Wed 3/3/2021, Normal      Blood Glucose Monitoring Suppl (FREESTYLE LITE) DEIRDRE by Does not apply route daily, Starting Tue 5/21/2019, Normal      buPROPion (WELLBUTRIN XL) 300 mg 24 hr tablet Take 300 mg by mouth every morning , Starting Fri 12/4/2020, Historical Med      dexlansoprazole (Dexilant) 60 MG capsule Take 1 capsule (60 mg total) by mouth daily, Starting Thu 2/18/2021, Normal      dicyclomine (BENTYL) 10 mg capsule Starting Fri 11/27/2020, Historical Med      docusate sodium (COLACE) 100 mg capsule Take 1 capsule (100 mg total) by mouth 2 (two) times a day, Starting Thu 11/19/2020, Normal      Easy Comfort Lancets MISC USE TO TEST THE BLOOD SUGAR THREE TIMES A DAY, Normal      FREESTYLE LITE test strip USE TO TEST THE BLOOD SUGAR THREE TIMES A DAY, Normal      hydrocortisone 1 % cream Apply topically 4 (four) times a day as needed for irritation, Starting Tue 4/13/2021, Normal      insulin aspart (NovoLOG FlexPen) 100 UNIT/ML injection pen Inject 6 Units under the skin 3 (three) times a day with meals, Starting Thu 9/24/2020, Normal      Insulin Pen Needle (PEN NEEDLES) 31G X 8 MM MISC Inject 1 Stick under the skin 4 (four) times a day (with meals and at bedtime), Starting Wed 3/28/2018, Normal      linaCLOtide (Linzess) 72 MCG CAPS Take 72 mcg by mouth daily, Starting Thu 2/18/2021, Normal      loperamide (IMODIUM) 2 mg capsule Take 1 capsule (2 mg total) by mouth 4 (four) times a day as needed for diarrhea, Starting Wed 5/5/2021, Normal      metroNIDAZOLE (FLAGYL) 500 mg tablet Take 1 tablet (500 mg total) by mouth every 12 (twelve) hours for 13 doses, Starting Sat 5/1/2021, Until Sun 5/9/2021, Normal      Nutritional Supplements (Ensure Original) LIQD Take 1 Bottle by mouth 2 (two) times a day, Starting Tue 10/13/2020, Print      ondansetron (ZOFRAN) 4 mg tablet Take 1 tablet (4 mg total) by mouth every 8 (eight) hours as needed for nausea or vomiting, Starting Thu 9/24/2020, Normal oxyCODONE-acetaminophen (PERCOCET)  mg per tablet Take 1 tablet by mouth every 6 (six) hours as needed for moderate painMax Daily Amount: 4 tablets, Starting Sat 4/10/2021, Normal      pantoprazole (PROTONIX) 40 mg tablet Take 1 tablet (40 mg total) by mouth 2 (two) times a day before meals, Starting Wed 8/26/2020, Normal      Probiotic Product (PROBIOTIC-10 PO) Take 1 tablet by mouth daily, Historical Med      QUEtiapine (SEROquel) 200 mg tablet Take 150 mg by mouth daily at bedtime , Historical Med      scopolamine (TRANSDERM-SCOP) 1 5 mg/3 days TD 72 hr patch Place 1 patch on the skin every third day, Starting Tue 4/27/2021, Normal      Toujeo Max SoloStar 300 units/mL CONCETRATED U-300 injection pen (2-unit dial) INJECT 22 UNITS UNDER THE SKIN DAILY, Starting Mon 2/22/2021, Normal      cycloSPORINE (RESTASIS) 0 05 % ophthalmic emulsion Administer 1 drop to both eyes 2 (two) times a day, Historical Med      lidocaine (XYLOCAINE) 5 % ointment APPLY TOPICALLY 2GM TWICE A DAY TO AFFECTED AREAS AS NEEDED FOR MILD PAIN, Normal           No discharge procedures on file      PDMP Review       Value Time User    PDMP Reviewed  Yes 4/29/2021 11:46 PM Noy Suazo [address] [facility] Provider  Electronically Signed by           Skyler Mims PA-C  05/09/21 2486

## 2021-05-09 NOTE — DISCHARGE INSTRUCTIONS
DISCHARGE INSTRUCTIONS:    FOLLOW UP WITH YOUR PRIMARY CARE PROVIDER OR THE [address] [place] [address]  MAKE AN APPOINTMENT TO BE SEEN  TAKE MEDICATION AS PRESCRIBED  IF RASH, SHORTNESS OF BREATH OR TROUBLE SWALLOWING, STOP TAKING THE MEDICATION AND BE SEEN  REST AND DRINK PLENTY OF FLUIDS  FOLLOW UP WITH OBGYN  FOLLOW UP WITH GASTROENTEROLOGY  IF SYMPTOMS WORSEN OR NEW SYMPTOMS ARISE, RETURN TO THE ER TO BE SEEN

## 2021-05-10 ENCOUNTER — OFFICE VISIT (OUTPATIENT)
Dept: GASTROENTEROLOGY | Facility: MEDICAL CENTER | Age: 51
End: 2021-05-10
Payer: COMMERCIAL

## 2021-05-10 ENCOUNTER — TELEPHONE (OUTPATIENT)
Dept: GASTROENTEROLOGY | Facility: MEDICAL CENTER | Age: 51
End: 2021-05-10

## 2021-05-10 VITALS — HEART RATE: 67 BPM | SYSTOLIC BLOOD PRESSURE: 130 MMHG | TEMPERATURE: 98.4 F | DIASTOLIC BLOOD PRESSURE: 76 MMHG

## 2021-05-10 DIAGNOSIS — R19.7 DIARRHEA OF PRESUMED INFECTIOUS ORIGIN: ICD-10-CM

## 2021-05-10 DIAGNOSIS — R11.2 NAUSEA AND VOMITING: Primary | ICD-10-CM

## 2021-05-10 DIAGNOSIS — K31.84 GASTROPARESIS: ICD-10-CM

## 2021-05-10 LAB
CANDIDA RRNA VAG QL PROBE: NEGATIVE
G VAGINALIS RRNA GENITAL QL PROBE: NEGATIVE
T VAGINALIS RRNA GENITAL QL PROBE: NEGATIVE

## 2021-05-10 PROCEDURE — 99214 OFFICE O/P EST MOD 30 MIN: CPT | Performed by: PHYSICIAN ASSISTANT

## 2021-05-10 PROCEDURE — 1111F DSCHRG MED/CURRENT MED MERGE: CPT | Performed by: PHYSICIAN ASSISTANT

## 2021-05-10 PROCEDURE — 1036F TOBACCO NON-USER: CPT | Performed by: PHYSICIAN ASSISTANT

## 2021-05-10 RX ORDER — PROMETHAZINE HYDROCHLORIDE 25 MG/1
25 TABLET ORAL EVERY 6 HOURS PRN
Qty: 60 TABLET | Refills: 3 | Status: SHIPPED | OUTPATIENT
Start: 2021-05-10

## 2021-05-10 RX ORDER — DIPHENOXYLATE HYDROCHLORIDE AND ATROPINE SULFATE 2.5; .025 MG/1; MG/1
1 TABLET ORAL 4 TIMES DAILY PRN
Qty: 120 TABLET | Refills: 4 | Status: SHIPPED | OUTPATIENT
Start: 2021-05-10 | End: 2022-01-26 | Stop reason: ALTCHOICE

## 2021-05-10 RX ORDER — ALBUTEROL SULFATE 90 UG/1
AEROSOL, METERED RESPIRATORY (INHALATION)
Qty: 18 G | Refills: 2 | Status: SHIPPED | OUTPATIENT
Start: 2021-05-10 | End: 2021-07-14

## 2021-05-10 RX ORDER — OXYCODONE AND ACETAMINOPHEN 10; 325 MG/1; MG/1
1 TABLET ORAL EVERY 6 HOURS PRN
Qty: 120 TABLET | Refills: 0 | Status: SHIPPED | OUTPATIENT
Start: 2021-05-10 | End: 2021-06-09 | Stop reason: SDUPTHER

## 2021-05-10 RX ORDER — DIAPER,BRIEF,INFANT-TODD,DISP
EACH MISCELLANEOUS
Qty: 56 G | Refills: 0 | Status: SHIPPED | OUTPATIENT
Start: 2021-05-10 | End: 2021-05-28

## 2021-05-10 NOTE — PATIENT INSTRUCTIONS
The patient is scheduled at [facility]   for an EGD  with Dr Girish Lechuga  on 05/11/2021  EGD prep instructions have been gone over in the office, with the patient, by the MA  The patient is aware that they will receive a call with the arrival time the day prior to procedure  The patient is aware they will need a ride to and from the procedure

## 2021-05-10 NOTE — TELEPHONE ENCOUNTER
Pt informed medicaiton was refilled  Also requested she make sure she's calling for refill at least 3 days before she needs the meds as we have a 2 business day policy response time for refills   Pt expressed undestanding

## 2021-05-10 NOTE — PROGRESS NOTES
Assessment/Plan:     Diagnoses and all orders for this visit:    Nausea and vomiting  Gastroparesis   patient has a known history of gastroparesis, status post pacemaker  She has not followed with Dr Humberto arteaga since last year  Complaining of worsening nausea and vomiting  Daily symptoms with what she describes as bilious emesis  She is on PPI b i d  Recently recommended to start on Carafate as well as Phenergan given no improvement in her nausea and vomiting  I also encouraged her to follow-up with Dr Rose Fraga is she may need interrogation of her pacemaker  She is concerned that she may have H pylori  Will order an H pylori stool test as well as repeat her endoscopy for further evaluation of her symptoms  In the meantime recommend small meals throughout the day and avoiding high-fiber foods  -     promethazine (PHENERGAN) 25 mg tablet; Take 1 tablet (25 mg total) by mouth every 6 (six) hours as needed for nausea or vomiting  -     EGD; Future  -     H  pylori antigen, stool; Future    Diarrhea of presumed infectious origin   she states for the last several weeks she has had diarrhea  She has a previous history of irritable bowel with constipation  She has been on several antibiotics but C diff and stool culture were within normal limits  Likely secondary to irritable bowel, would recommend treating with Lomotil  If symptoms do not improve, can consider colonoscopy to rule out IBD or microscopic colitis  Will see her back after to discuss all results  -     diphenoxylate-atropine (LOMOTIL) 2 5-0 025 mg per tablet; Take 1 tablet by mouth 4 (four) times a day as needed for diarrhea                Subjective:      Patient ID: Haider Carroll is a 48 y o  female  HPI       Patient is following up from the emergency room  She has a history of GERD, gastroparesis, status post gastric pacemaker and irritable bowel syndrome  She states she is having daily vomiting which she describes as bile     She denies throwing up any food  She is having epigastric pain  She has been seen in the emergency room multiple times without relief  She is taking Zofran, no improvement  Recently sent Phenergan but has not yet started this  She is on pantoprazole 40 mg b i d  She was also stent Carafate but has not yet started this either  She also admits to diarrhea, greater than 4 times per day  C diff and stool culture were recently checked and were within normal limits  She states she did try Imodium without improvement  She has had a CT scan, right upper quadrant ultrasound, ultrasound of kidney and bladder, CT renal stone study all within the last 2 weeks which were within normal limits  She was found to have Trichomonas and was treated with Flagyl  She does admit to continued vaginal discharge  Her last EGD was in December 2019 showing mild eroded mucosa in the esophagus, edematous mucosa in the duodenal bulb  She underwent Botox injection at that time  Her last colonoscopy was in December 2018 where she was found to have a  Tubular adenomatous polyp      Patient Active Problem List   Diagnosis    Acute renal failure superimposed on stage 3 chronic kidney disease (HCC)    Type 2 diabetes mellitus with renal complication (HCC)    Dyslipidemia    Granulomatosis with polyangiitis (HCC)    MPA (microscopic polyangiitis) (HCC)    Asthma    Benign hypertension with CKD (chronic kidney disease) stage IV (HCC)    Acute pancreatitis    Chronic pain    Noncompliance    Bipolar 1 disorder (HCC)    Intractable abdominal pain    Epigastric pain    Pancreatitis    Ovarian cyst    Postherpetic neuralgia    Anemia of chronic disease    Arthralgia of multiple joints    Cataplexy    Weakness of both lower extremities    Diarrhea of presumed infectious origin    Chronic pelvic pain in female    Plantar wart, right foot    Seasonal allergic rhinitis due to pollen    Gastroesophageal reflux disease    Weakness of both upper extremities    Persistent proteinuria    Left leg pain    Controlled substance agreement signed    Chronic narcotic use    Small fiber neuropathy    IBS (irritable bowel syndrome)    Leukocytosis    Gastroparesis    Hyperosmia    Smell disturbance    Contusion of left hip    Trochanteric bursitis of left hip    Neuropathy    Attention deficit hyperactivity disorder (ADHD)    Elevated blood pressure reading    Costochondritis    Hearing difficulty of both ears    Vaginal atrophy    Alpha-hemolytic Streptococcus positive urine culture    Nausea and vomiting     Allergies   Allergen Reactions    Prozac [Fluoxetine Hcl]      SI    Bactrim [Sulfamethoxazole-Trimethoprim]      Pt "They think that is what cause the pancreatitis"     Lamictal [Lamotrigine] GI Intolerance    Lithium Other (See Comments)    Haldol [Haloperidol] Other (See Comments)     "I don't like it"    Ibuprofen     Lexapro [Escitalopram Oxalate] Rash    Navane [Thiothixene]      SI    Other      "novaine?" antipsychotic     Current Outpatient Medications on File Prior to Visit   Medication Sig    Alcohol Swabs (ALCOHOL PADS) 70 % PADS by Does not apply route 4 (four) times a day    amLODIPine (NORVASC) 5 mg tablet TAKE ONE TABLET BY MOUTH ONCE DAILY    amphetamine-dextroamphetamine (ADDERALL XR) 20 MG 24 hr capsule Take 1 capsule (20 mg total) by mouth 2 (two) times a dayMax Daily Amount: 40 mg    atorvastatin (LIPITOR) 20 mg tablet Take 1 tablet (20 mg total) by mouth daily    Blood Glucose Monitoring Suppl (FREESTYLE LITE) DEIRDRE by Does not apply route daily    buPROPion (WELLBUTRIN XL) 300 mg 24 hr tablet Take 300 mg by mouth every morning     cycloSPORINE (RESTASIS) 0 05 % ophthalmic emulsion Administer 1 drop to both eyes 2 (two) times a day    dexlansoprazole (Dexilant) 60 MG capsule Take 1 capsule (60 mg total) by mouth daily    dicyclomine (BENTYL) 10 mg capsule     dicyclomine (BENTYL) 20 mg tablet Take 1 tablet (20 mg total) by mouth every 6 (six) hours    docusate sodium (COLACE) 100 mg capsule Take 1 capsule (100 mg total) by mouth 2 (two) times a day    Easy Comfort Lancets MISC USE TO TEST THE BLOOD SUGAR THREE TIMES A DAY    FREESTYLE LITE test strip USE TO TEST THE BLOOD SUGAR THREE TIMES A DAY    insulin aspart (NovoLOG FlexPen) 100 UNIT/ML injection pen Inject 6 Units under the skin 3 (three) times a day with meals    Insulin Pen Needle (PEN NEEDLES) 31G X 8 MM MISC Inject 1 Stick under the skin 4 (four) times a day (with meals and at bedtime)    lidocaine (XYLOCAINE) 5 % ointment APPLY TOPICALLY 2GM TWICE A DAY TO AFFECTED AREAS AS NEEDED FOR MILD PAIN    linaCLOtide (Linzess) 72 MCG CAPS Take 72 mcg by mouth daily    loperamide (IMODIUM) 2 mg capsule Take 1 capsule (2 mg total) by mouth 4 (four) times a day as needed for diarrhea    Nutritional Supplements (Ensure Original) LIQD Take 1 Bottle by mouth 2 (two) times a day    ondansetron (ZOFRAN) 4 mg tablet Take 1 tablet (4 mg total) by mouth every 8 (eight) hours as needed for nausea or vomiting    pantoprazole (PROTONIX) 40 mg tablet Take 1 tablet (40 mg total) by mouth 2 (two) times a day before meals    Probiotic Product (PROBIOTIC-10 PO) Take 1 tablet by mouth daily    QUEtiapine (SEROquel) 200 mg tablet Take 150 mg by mouth daily at bedtime     scopolamine (TRANSDERM-SCOP) 1 5 mg/3 days TD 72 hr patch Place 1 patch on the skin every third day    sucralfate (CARAFATE) 1 g/10 mL suspension Take 10 mL (1 g total) by mouth 4 (four) times a day    Toujeo Max SoloStar 300 units/mL CONCETRATED U-300 injection pen (2-unit dial) INJECT 22 UNITS UNDER THE SKIN DAILY    [DISCONTINUED] albuterol (PROVENTIL HFA,VENTOLIN HFA) 90 mcg/act inhaler Inhale 2 puffs every 6 (six) hours as needed for wheezing or shortness of breath    [DISCONTINUED] hydrocortisone 1 % cream Apply topically 4 (four) times a day as needed for irritation  [DISCONTINUED] promethazine (PHENERGAN) 25 mg tablet Take 1 tablet (25 mg total) by mouth every 6 (six) hours as needed for nausea or vomiting    albuterol (PROVENTIL HFA,VENTOLIN HFA) 90 mcg/act inhaler INHALE TWO PUFFS BY MOUTH EVERY 6 HOURS AS NEEDED FOR WHEEZING OR SHORTNESS OF BREATH    hydrocortisone 1 % cream APPLY TOPICALLY FOUR TIMES A DAY AS NEEDED FOR IRRITATION    [] metroNIDAZOLE (FLAGYL) 500 mg tablet Take 1 tablet (500 mg total) by mouth every 12 (twelve) hours for 13 doses    oxyCODONE-acetaminophen (PERCOCET)  mg per tablet Take 1 tablet by mouth every 6 (six) hours as needed for moderate painMax Daily Amount: 4 tablets     Current Facility-Administered Medications on File Prior to Visit   Medication    [DISCONTINUED] oxyCODONE-acetaminophen (PERCOCET) 5-325 mg per tablet 1 tablet     Family History   Problem Relation Age of Onset    No Known Problems Mother     No Known Problems Father     Colon cancer Neg Hx     Drug abuse Neg Hx         mother father    Mental illness Neg Hx         disorder, mother father    Cancer Neg Hx     Breast cancer Neg Hx      Past Medical History:   Diagnosis Date    ADHD     Anemia of chronic disease     Anxiety     Asthma     Asthma     Borderline personality disorder (HCC)     Cataplexy     Chronic abdominal pain     CKD (chronic kidney disease) stage 3, GFR 30-59 ml/min (HCC)     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[address] )     Cushing syndrome ([address] )     Diabetes mellitus ([address] )     DVT (deep venous thrombosis) (HCC)     GERD (gastroesophageal reflux disease)     History of acute pancreatitis     felt secondary to Bactrim    History of transfusion     HTN (hypertension)     Hypertension     Liver disease     fatty liver    Microscopic polyangiitis (HCC)     Morbid obesity (HCC)     MPA (microscopic polyangiitis) (HCC)     Ovarian cyst     PTSD (post-traumatic stress disorder)     Renal disorder     Self-inflicted injury     self inflicted skin wounds    Wegener's granulomatosis with renal involvement ([facility] [address] ) 2015     Social History     Socioeconomic History    Marital status: Single     Spouse name: None    Number of children: None    Years of education: None    Highest education level: None   Occupational History    Occupation: disability   Social Needs    Financial resource strain: None    Food insecurity     Worry: None     Inability: None    Transportation needs     Medical: None     Non-medical: None   Tobacco Use    Smoking status: Former Smoker     Quit date: 2011     Years since quitting: 10 3    Smokeless tobacco: Never Used    Tobacco comment: marijuana   Substance and Sexual Activity    Alcohol use: Never     Frequency: Never     Binge frequency: Never    Drug use: Yes     Types: Marijuana     Comment: marijuana daily    Sexual activity: Not Currently     Partners: Female, Male     Birth control/protection: None   Lifestyle    Physical activity     Days per week: None     Minutes per session: None    Stress: None   Relationships    Social connections     Talks on phone: None     Gets together: None     Attends [employer] service: None     Active member of club or organization: None     Attends meetings of clubs or organizations: None     Relationship status: None    Intimate partner violence     Fear of current or ex partner: None     Emotionally abused: None     Physically abused: None     Forced sexual activity: None   Other Topics Concern    None   Social History Narrative    Social hx reviewed     No pref on [employer] beliefs     Daily caffeine consumption 1 serving/day     Past Surgical History:   Procedure Laterality Date    COLONOSCOPY      ESOPHAGOGASTRODUODENOSCOPY  09/11/2015    mild antral gastritis    GASTRIC STIMULATOR IMPLANT SURGERY  06/25/2020    NE COLONOSCOPY FLX DX W/COLLJ SPEC WHEN PFRMD N/A 12/14/2018    Procedure: COLONOSCOPY with polypectomy;  Surgeon: Chucho Zhong MD;  Location: [facility]; Service: Gastroenterology    IN ESOPHAGOGASTRODUODENOSCOPY TRANSORAL DIAGNOSTIC N/A 12/14/2018    Procedure: ESOPHAGOGASTRODUODENOSCOPY (EGD) with biopsy;  Surgeon: Micheline Lopez MD;  Location: [facility]; Service: Gastroenterology    RELEASE SCAR CONTRACTURE / GRAFT REPAIRS OF HAND Bilateral     UPPER GASTROINTESTINAL ENDOSCOPY  12/26/2019         Review of Systems   All other systems reviewed and are negative  Objective:      /76   Pulse 67   Temp 98 4 °F (36 9 °C)   LMP  (LMP Unknown)          Physical Exam  Constitutional:       Appearance: She is well-developed  HENT:      Head: Normocephalic and atraumatic  Eyes:      Conjunctiva/sclera: Conjunctivae normal    Neck:      Musculoskeletal: Normal range of motion  Cardiovascular:      Rate and Rhythm: Normal rate and regular rhythm  Pulmonary:      Effort: Pulmonary effort is normal       Breath sounds: Normal breath sounds  Abdominal:      General: Bowel sounds are normal  There is no distension  Palpations: Abdomen is soft  Tenderness: There is abdominal tenderness ( epigastric)  Musculoskeletal: Normal range of motion  Skin:     General: Skin is warm and dry  Neurological:      Mental Status: She is alert and oriented to person, place, and time     Psychiatric:         Behavior: Behavior normal       Comments: Tearful, upset, angry

## 2021-05-11 ENCOUNTER — ANESTHESIA EVENT (OUTPATIENT)
Dept: GASTROENTEROLOGY | Facility: HOSPITAL | Age: 51
End: 2021-05-11

## 2021-05-11 NOTE — TELEPHONE ENCOUNTER
Patient is rescheduled for 5/13/21 with Dr Balbina Gill at [facility]

## 2021-05-12 ENCOUNTER — ANNUAL EXAM (OUTPATIENT)
Dept: OBGYN CLINIC | Facility: CLINIC | Age: 51
End: 2021-05-12

## 2021-05-12 VITALS
DIASTOLIC BLOOD PRESSURE: 83 MMHG | SYSTOLIC BLOOD PRESSURE: 128 MMHG | BODY MASS INDEX: 28.53 KG/M2 | HEART RATE: 67 BPM | WEIGHT: 156 LBS

## 2021-05-12 DIAGNOSIS — N95.2 VAGINAL ATROPHY: ICD-10-CM

## 2021-05-12 DIAGNOSIS — Z12.31 ENCOUNTER FOR SCREENING MAMMOGRAM FOR MALIGNANT NEOPLASM OF BREAST: ICD-10-CM

## 2021-05-12 DIAGNOSIS — N89.8 VAGINA ITCHING: ICD-10-CM

## 2021-05-12 DIAGNOSIS — Z12.4 SCREENING FOR CERVICAL CANCER: ICD-10-CM

## 2021-05-12 DIAGNOSIS — Z01.411 ENCOUNTER FOR GYNECOLOGICAL EXAMINATION WITH ABNORMAL FINDING: Primary | ICD-10-CM

## 2021-05-12 PROCEDURE — G0101 CA SCREEN;PELVIC/BREAST EXAM: HCPCS | Performed by: NURSE PRACTITIONER

## 2021-05-12 PROCEDURE — G0145 SCR C/V CYTO,THINLAYER,RESCR: HCPCS | Performed by: NURSE PRACTITIONER

## 2021-05-12 PROCEDURE — 87624 HPV HI-RISK TYP POOLED RSLT: CPT | Performed by: NURSE PRACTITIONER

## 2021-05-12 RX ORDER — ESTRADIOL 0.1 MG/G
1 CREAM VAGINAL DAILY
Qty: 42.5 G | Refills: 1 | Status: SHIPPED | OUTPATIENT
Start: 2021-05-12 | End: 2021-05-18

## 2021-05-12 NOTE — PATIENT INSTRUCTIONS
OBESITY     Obesity is defined as a body mass index (BMI) which is greater than 30  Your Body mass index is 28 53 kg/m²       The risks of obesity include  many health problems, such as injuries or physical disability  You may need tests to check for the following:  · Diabetes     · High blood pressure or high cholesterol     · Heart disease     · Gallbladder or liver disease     · Cancer of the colon, breast, prostate, liver, or kidney     · Sleep apnea     · Arthritis or gout    Seek care immediately if:   · You have a severe headache, confusion, or difficulty speaking  · You have weakness on one side of your body  · You have chest pain, sweating, or shortness of breath  Contact your healthcare provider if:   · You have symptoms of gallbladder or liver disease, such as pain in your upper abdomen  · You have knee or hip pain and discomfort while walking  · You have symptoms of diabetes, such as intense hunger and thirst, and frequent urination  · You have symptoms of sleep apnea, such as snoring or daytime sleepiness  · You have questions or concerns about your condition or care  Treatment for obesity  focuses on helping you lose weight to improve your health  Even a small decrease in BMI can reduce the risk for many health problems  Your healthcare provider will help you set a weight-loss goal   · Lifestyle changes  are the first step in treating obesity  These include making healthy food choices and getting regular physical activity  Your healthcare provider may suggest a weight-loss program that involves coaching, education, and therapy  · Medicine  may help you lose weight when it is used with a healthy diet and physical activity  · Surgery  can help you lose weight if you are very obese and have other health problems  There are several types of weight-loss surgery  Ask your healthcare provider for more information      Be successful losing weight:   · Set small, realistic goals  An example of a small goal is to walk for 20 minutes 5 days a week  Anther goal is to lose 5% of your body weight  · Tell friends, family members, and coworkers about your goals  and ask for their support  Ask a friend to lose weight with you, or join a weight-loss support group  · Identify foods or triggers that may cause you to overeat , and find ways to avoid them  Remove tempting high-calorie foods from your home and workplace  Place a bowl of fresh fruit on your kitchen counter  If stress causes you to eat, then find other ways to cope with stress  · Keep a diary to track what you eat and drink  Also write down how many minutes of physical activity you do each day  Weigh yourself once a week and record it in your diary  Eating changes: You will need to eat 500 to 1,000 fewer calories each day than you currently eat to lose 1 to 2 pounds a week  The following changes will help you cut calories:  · Eat smaller portions  Use small plates, no larger than 9 inches in diameter  Fill your plate half full of fruits and vegetables  Measure your food using measuring cups until you know what a serving size looks like  · Eat 3 meals and 1 or 2 snacks each day  Plan your meals in advance  Kn[place] and eat at home most of the time  Eat slowly  · Eat fruits and vegetables at every meal   They are low in calories and high in fiber, which makes you feel full  Do not add butter, margarine, or cream sauce to vegetables  Use herbs to season steamed vegetables  · Eat less fat and fewer fried foods  Eat more baked or grilled chicken and fish  These protein sources are lower in calories and fat than red meat  Limit fast food  Dress your salads with olive oil and vinegar instead of bottled dressing  · Limit the amount of sugar you eat  Do not drink sugary beverages  Limit alcohol  Activity changes:  Physical activity is good for your body in many ways   It helps you burn calories and build strong muscles  It decreases stress and depression, and improves your mood  It can also help you sleep better  Talk to your healthcare provider before you begin an exercise program   · Exercise for at least 30 minutes 5 days a week  Start slowly  Set aside time each day for physical activity that you enjoy and that is convenient for you  It is best to do both weight training and an activity that increases your heart rate, such as walking, bicycling, or swimming  · Find ways to be more active  Do yard work and housecleaning  Walk up the stairs instead of using elevators  Spend your leisure time going to events that require walking, such as outdoor festivals or fairs  This extra physical activity can help you lose weight and keep it off  Follow up with your primary healthcare provider as directed  You may need to meet with a dietitian  Write down your questions so you remember to ask them during your visits

## 2021-05-12 NOTE — LETTER
2021    To Shaun Bryson  : 1970      This letter is to advise you that your recent PAP SMEAR results were reviewed by me and are NORMAL    We will see you in 1 year for your annual exam     PRASANTH Lemon

## 2021-05-12 NOTE — PROGRESS NOTES
ANNUAL GYNECOLOGICAL EXAMINATION    Yina Ibarra is a 48 y o  female who presents today for annual GYN exam   Her last pap smear was performed many years ago and result was WNL per patient  She reports no history of abnormal pap smears in her past  She has never had a mammogram performed  She reports menses as absent since 2015  She reports significant vaginal itching for the past 3 weeks  She has been seen in the ER multiple times over the past month for multiple complaints, including vaginal itching and discharge  She was treated for trichomonas with Flagyl which was noted in urine culture on 4/23/21  She reports that there was no change in her itching with treatment  On 5/9/21 she was seen in ER again and vaginosis probe was performed and was negative for candida, gardnerella, and trichomonas    Her general medical history has been reviewed and she reports it as follows:    Past Medical History:   Diagnosis Date    ADHD     Anemia of chronic disease     Anxiety     Asthma     Asthma     Borderline personality disorder (HCC)     Cataplexy     Chronic abdominal pain     CKD (chronic kidney disease) stage 3, GFR 30-59 ml/min ([facility])     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     Cushing syndrome ([facility]     Diabetes mellitus ([address]     DVT (deep venous thrombosis) ([facility]     GERD (gastroesophageal reflux disease)     History of acute pancreatitis     felt secondary to Bactrim    History of transfusion     HTN (hypertension)     Hypertension     Liver disease     fatty liver    Microscopic polyangiitis (HCC)     Morbid obesity ([address] )     MPA (microscopic polyangiitis) ([facility]     Ovarian cyst     PTSD (post-traumatic stress disorder)     Renal disorder     Self-inflicted injury     self inflicted skin wounds    Wegener's granulomatosis with renal involvement ([facility] ) 2015     Past Surgical History:   Procedure Laterality Date    COLONOSCOPY      ESOPHAGOGASTRODUODENOSCOPY  09/11/2015    mild antral gastritis  GASTRIC STIMULATOR IMPLANT SURGERY  2020    [place] COLONOSCOPY FLX DX W/COLLJ SPEC WHEN PFRMD N/A 2018    Procedure: COLONOSCOPY with polypectomy;  Surgeon: Judson Hewitt MD;  Location: [facility]; Service: Gastroenterology    [place] ESOPHAGOGASTRODUODENOSCOPY TRANSORAL DIAGNOSTIC N/A 2018    Procedure: ESOPHAGOGASTRODUODENOSCOPY (EGD) with biopsy;  Surgeon: Judson Hewitt MD;  Location: [facility]; Service: Gastroenterology    RELEASE SCAR CONTRACTURE / GRAFT REPAIRS OF HAND Bilateral     UPPER GASTROINTESTINAL ENDOSCOPY  2019     OB History        0    Para   0    Term   0       0    AB   0    Living   0       SAB   0    TAB   0    Ectopic   0    Multiple   0    Live Births   0               Social History     Tobacco Use    Smoking status: Former Smoker     Quit date:      Years since quitting: 10 3    Smokeless tobacco: Never Used    Tobacco comment: marijuana   Substance Use Topics    Alcohol use: Never     Frequency: Never     Binge frequency: Never    Drug use: Yes     Types: Marijuana     Comment: marijuana daily     Cancer-related family history is negative for Colon cancer, Cancer, and Breast cancer      Current Outpatient Medications   Medication Instructions    albuterol (PROVENTIL HFA,VENTOLIN HFA) 90 mcg/act inhaler INHALE TWO PUFFS BY MOUTH EVERY 6 HOURS AS NEEDED FOR WHEEZING OR SHORTNESS OF BREATH    Alcohol Swabs (ALCOHOL PADS) 70 % PADS Does not apply, 4 times daily    amLODIPine (NORVASC) 5 mg tablet TAKE ONE TABLET BY MOUTH ONCE DAILY    amphetamine-dextroamphetamine (ADDERALL XR) 20 MG 24 hr capsule 20 mg, Oral, 2 times daily    atorvastatin (LIPITOR) 20 mg, Oral, Daily    Blood Glucose Monitoring Suppl (FREESTYLE LITE) DEIRDRE Does not apply, Daily    buPROPion (WELLBUTRIN XL) 300 mg, Oral, Every morning    cycloSPORINE (RESTASIS) 0 05 % ophthalmic emulsion 1 drop, Both Eyes, 2 times daily    Dexilant 60 mg, Oral, Daily    dicyclomine (BENTYL) 10 mg capsule No dose, route, or frequency recorded   dicyclomine (BENTYL) 20 mg, Oral, Every 6 hours    diphenoxylate-atropine (LOMOTIL) 2 5-0 025 mg per tablet 1 tablet, Oral, 4 times daily PRN    docusate sodium (COLACE) 100 mg, Oral, 2 times daily    Easy Comfort Lancets MISC USE TO TEST THE BLOOD SUGAR THREE TIMES A DAY    estradiol (ESTRACE) 1 g, Vaginal, Daily    FREESTYLE LITE test strip USE TO TEST THE BLOOD SUGAR THREE TIMES A DAY    hydrocortisone 1 % cream APPLY TOPICALLY FOUR TIMES A DAY AS NEEDED FOR IRRITATION    insulin aspart (NOVOLOG FLEXPEN) 6 Units, Subcutaneous, 3 times daily with meals    Insulin Pen Needle (PEN NEEDLES) 31G X 8 MM MISC 1 Stick, Subcutaneous, 4 times daily (with meals and at bedtime)    lidocaine (XYLOCAINE) 5 % ointment APPLY TOPICALLY 2GM TWICE A DAY TO AFFECTED AREAS AS NEEDED FOR MILD PAIN    Linzess 72 mcg, Oral, Daily    loperamide (IMODIUM) 2 mg, Oral, 4 times daily PRN    Nutritional Supplements (Ensure Original) LIQD 1 Bottle, Oral, 2 times daily    ondansetron (ZOFRAN) 4 mg, Oral, Every 8 hours PRN    oxyCODONE-acetaminophen (PERCOCET)  mg per tablet 1 tablet, Oral, Every 6 hours PRN    pantoprazole (PROTONIX) 40 mg, Oral, 2 times daily before meals    Probiotic Product (PROBIOTIC-10 PO) 1 tablet, Oral, Daily    promethazine (PHENERGAN) 25 mg, Oral, Every 6 hours PRN    QUEtiapine (SEROQUEL) 150 mg, Oral, Daily at bedtime    scopolamine (TRANSDERM-SCOP) 1 5 mg/3 days TD 72 hr patch 1 patch, Transdermal, Every 72 hours    sucralfate (CARAFATE) 1 g, Oral, 4 times daily    Toujeo Max SoloStar 22 Units, Subcutaneous, Daily       Review of Systems:  Review of Systems   Gastrointestinal: Positive for abdominal pain  Genitourinary: Negative for vaginal bleeding          Vulvar/vaginal itching/burning       Physical Exam:  /83 (BP Location: Left arm, Patient Position: Sitting, Cuff Size: Standard)   Pulse 67   Wt 70 8 kg (156 lb)   LMP  (LMP Unknown)   BMI 28 53 kg/m²   Physical Exam  Constitutional:       General: She is not in acute distress  Genitourinary:      Cervix, uterus, right adnexa and left adnexa normal       No vulval lesion, tenderness, ulcerations or rash noted  Vulva exam comments: Atrophic changes noted  Vaginal erythema and atrophy present  No vaginal tenderness  Uterus is not enlarged or tender  Right adnexa not tender  Left adnexa not tender  Pulmonary:      Effort: Pulmonary effort is normal    Abdominal:      Palpations: Abdomen is soft  Tenderness: There is abdominal tenderness  Neurological:      Mental Status: She is alert  Skin:     General: Skin is warm and dry  Vitals signs reviewed  Assessment/Plan:   1  Abnormal well-woman GYN exam   2  Cervical cancer screening:  Normal cervical exam   Pap smear done with HPV co-testing  3  STD screening:  Patient declines  4  Breast cancer screening:  Normal breast exam   Order placed for bilateral screening mammogram   Reviewed breast self-awareness  5  Depression Screening:  Patient refuses to perform screening  6  BMI Counseling: Body mass index is 28 53 kg/m²  Discussed the patient's BMI with her  The BMI is above normal  Patient referred to PCP due to patient being obese  7  Vulvovaginal atrophy:  Given Rx Estrace cream    8  Return to office 2 weeks to re-evaluate effectiveness of Estrace

## 2021-05-13 ENCOUNTER — ANESTHESIA (OUTPATIENT)
Dept: GASTROENTEROLOGY | Facility: HOSPITAL | Age: 51
End: 2021-05-13

## 2021-05-13 ENCOUNTER — HOSPITAL ENCOUNTER (OUTPATIENT)
Dept: GASTROENTEROLOGY | Facility: HOSPITAL | Age: 51
Setting detail: OUTPATIENT SURGERY
Discharge: HOME | End: 2021-05-13
Admitting: INTERNAL MEDICINE
Payer: COMMERCIAL

## 2021-05-13 VITALS
HEIGHT: 62 IN | DIASTOLIC BLOOD PRESSURE: 82 MMHG | BODY MASS INDEX: 28.71 KG/M2 | HEART RATE: 72 BPM | OXYGEN SATURATION: 100 % | TEMPERATURE: 98 F | SYSTOLIC BLOOD PRESSURE: 134 MMHG | RESPIRATION RATE: 16 BRPM | WEIGHT: 156 LBS

## 2021-05-13 DIAGNOSIS — R11.2 NAUSEA AND VOMITING: ICD-10-CM

## 2021-05-13 DIAGNOSIS — K31.84 GASTROPARESIS: ICD-10-CM

## 2021-05-13 PROBLEM — F17.200 SMOKING: Status: ACTIVE | Noted: 2021-05-13

## 2021-05-13 LAB
GLUCOSE SERPL-MCNC: 97 MG/DL (ref 65–140)
HPV HR 12 DNA CVX QL NAA+PROBE: NEGATIVE
HPV16 DNA CVX QL NAA+PROBE: NEGATIVE
HPV18 DNA CVX QL NAA+PROBE: NEGATIVE

## 2021-05-13 PROCEDURE — 43239 EGD BIOPSY SINGLE/MULTIPLE: CPT | Performed by: INTERNAL MEDICINE

## 2021-05-13 PROCEDURE — 82948 REAGENT STRIP/BLOOD GLUCOSE: CPT

## 2021-05-13 PROCEDURE — 88305 TISSUE EXAM BY PATHOLOGIST: CPT | Performed by: PATHOLOGY

## 2021-05-13 RX ORDER — ONDANSETRON 2 MG/ML
4 INJECTION INTRAMUSCULAR; INTRAVENOUS EVERY 6 HOURS PRN
Status: DISCONTINUED | OUTPATIENT
Start: 2021-05-13 | End: 2021-05-17 | Stop reason: HOSPADM

## 2021-05-13 RX ORDER — SODIUM CHLORIDE 9 MG/ML
125 INJECTION, SOLUTION INTRAVENOUS CONTINUOUS
Status: DISCONTINUED | OUTPATIENT
Start: 2021-05-13 | End: 2021-05-17 | Stop reason: HOSPADM

## 2021-05-13 RX ORDER — LIDOCAINE HYDROCHLORIDE 20 MG/ML
INJECTION, SOLUTION EPIDURAL; INFILTRATION; INTRACAUDAL; PERINEURAL AS NEEDED
Status: DISCONTINUED | OUTPATIENT
Start: 2021-05-13 | End: 2021-05-13

## 2021-05-13 RX ORDER — PROPOFOL 10 MG/ML
INJECTION, EMULSION INTRAVENOUS AS NEEDED
Status: DISCONTINUED | OUTPATIENT
Start: 2021-05-13 | End: 2021-05-13

## 2021-05-13 RX ORDER — SUCCINYLCHOLINE/SOD CL,ISO/PF 100 MG/5ML
SYRINGE (ML) INTRAVENOUS AS NEEDED
Status: DISCONTINUED | OUTPATIENT
Start: 2021-05-13 | End: 2021-05-13

## 2021-05-13 RX ADMIN — Medication 100 MG: at 11:01

## 2021-05-13 RX ADMIN — ONDANSETRON 4 MG: 2 INJECTION INTRAMUSCULAR; INTRAVENOUS at 11:38

## 2021-05-13 RX ADMIN — PROPOFOL 200 MG: 10 INJECTION, EMULSION INTRAVENOUS at 11:01

## 2021-05-13 RX ADMIN — SODIUM CHLORIDE 125 ML/HR: 0.9 INJECTION, SOLUTION INTRAVENOUS at 10:28

## 2021-05-13 RX ADMIN — LIDOCAINE HYDROCHLORIDE 100 MG: 20 INJECTION, SOLUTION EPIDURAL; INFILTRATION; INTRACAUDAL; PERINEURAL at 11:01

## 2021-05-13 NOTE — NURSING NOTE
Coughing and gagging incessantly since brought out of procedure room  Encouraged to calm down and slow breathing

## 2021-05-13 NOTE — ANESTHESIA PREPROCEDURE EVALUATION
Procedure:  EGD    Relevant Problems   CARDIO   (+) Benign hypertension with CKD (chronic kidney disease) stage IV (HCC)      ENDO   (+) Type 2 diabetes mellitus with renal complication (HCC)      GI/HEPATIC   (+) Acute pancreatitis   (+) Gastroesophageal reflux disease   (+) Pancreatitis      /RENAL   (+) Acute renal failure superimposed on stage 3 chronic kidney disease (HCC)      HEMATOLOGY   (+) Anemia of chronic disease      NEURO/PSYCH   (+) Chronic pain   (+) Chronic pelvic pain in female      PULMONARY  marijuana   (+) Asthma   (+) Smoking        Physical Exam    Airway    Mallampati score: III  TM Distance: >3 FB       Dental       Cardiovascular  Cardiovascular exam normal    Pulmonary  Pulmonary exam normal     Other Findings        Anesthesia Plan  ASA Score- 3     Anesthesia Type- general with ASA Monitors  Additional Monitors:   Airway Plan:           Plan Factors-    Chart reviewed  Patient is a current smoker  Patient instructed to abstain from smoking on day of procedure  Patient did not smoke on day of surgery  Obstructive sleep apnea risk education given perioperatively  Induction- intravenous  Postoperative Plan-     Informed Consent- Anesthetic plan and risks discussed with patient

## 2021-05-13 NOTE — DISCHARGE INSTRUCTIONS
Upper Endoscopy   WHAT YOU NEED TO KNOW:   An upper endoscopy is also called an upper gastrointestinal (GI) endoscopy, or an esophagogastroduodenoscopy (EGD)  You may feel bloated, gassy, or have some abdominal discomfort after your procedure  Your throat may be sore for 24 to 36 hours  You may burp or pass gas from air that is still inside your body  DISCHARGE INSTRUCTIONS:   Call 911 for any of the following:   · You have sudden chest pain or trouble breathing  Seek care immediately if:   · You feel dizzy or faint  · You have trouble swallowing  · Your bowel movements are very dark or black  · Your abdomen is hard and firm and you have severe pain  · You vomit blood  Contact your healthcare provider if:   · You feel full or bloated and cannot burp or pass gas  · You have not had a bowel movement for 3 days after your procedure  · You have neck pain  · You have a fever or chills  · You have nausea or are vomiting  · You have a rash or hives  · You have questions or concerns about your endoscopy  Relieve a sore throat:  Suck on throat lozenges or crushed ice  Gargle with a small amount of warm salt water  Mix 1 teaspoon of salt and 1 cup of warm water to make salt water  Relieve gas and discomfort from bloating:  Lie on your right side with a heating pad on your abdomen  Take short walks to help pass gas  Eat small meals until bloating is relieved  Rest after your procedure: You have been given medicine to relax you  Do not  drive or make important decisions until the day after your procedure  Return to your normal activity as directed  You can usually return to work the day after your procedure  Follow up with your healthcare provider as directed:  Write down your questions so you remember to ask them during your visits     © 2017 [address] is for End User's use only and may not be sold, redistributed or otherwise used for commercial purposes  All illustrations and images included in CareNotes® are the copyrighted property of [employer]  or [employer]  The above information is an  only  It is not intended as medical advice for individual conditions or treatments  Talk to your doctor, nurse or pharmacist before following any medical regimen to see if it is safe and effective for you

## 2021-05-14 ENCOUNTER — TELEPHONE (OUTPATIENT)
Dept: FAMILY MEDICINE CLINIC | Facility: CLINIC | Age: 51
End: 2021-05-14

## 2021-05-14 DIAGNOSIS — J30.1 SEASONAL ALLERGIC RHINITIS DUE TO POLLEN: Primary | ICD-10-CM

## 2021-05-14 RX ORDER — MONTELUKAST SODIUM 10 MG/1
10 TABLET ORAL
Qty: 90 TABLET | Refills: 1 | Status: SHIPPED | OUTPATIENT
Start: 2021-05-14 | End: 2021-10-20

## 2021-05-17 ENCOUNTER — TELEPHONE (OUTPATIENT)
Dept: OBGYN CLINIC | Facility: CLINIC | Age: 51
End: 2021-05-17

## 2021-05-17 DIAGNOSIS — N95.2 ATROPHIC VAGINITIS: Primary | ICD-10-CM

## 2021-05-18 LAB
LAB AP GYN PRIMARY INTERPRETATION: NORMAL
Lab: NORMAL

## 2021-05-19 DIAGNOSIS — F31.9 BIPOLAR 1 DISORDER (HCC): ICD-10-CM

## 2021-05-19 RX ORDER — DEXTROAMPHETAMINE SACCHARATE, AMPHETAMINE ASPARTATE MONOHYDRATE, DEXTROAMPHETAMINE SULFATE AND AMPHETAMINE SULFATE 5; 5; 5; 5 MG/1; MG/1; MG/1; MG/1
20 CAPSULE, EXTENDED RELEASE ORAL 2 TIMES DAILY
Qty: 60 CAPSULE | Refills: 0 | Status: SHIPPED | OUTPATIENT
Start: 2021-05-19 | End: 2021-06-18 | Stop reason: SDUPTHER

## 2021-05-21 DIAGNOSIS — K59.00 CONSTIPATION, UNSPECIFIED CONSTIPATION TYPE: ICD-10-CM

## 2021-05-24 RX ORDER — DOCUSATE SODIUM 100 MG/1
CAPSULE, LIQUID FILLED ORAL
Qty: 180 CAPSULE | Refills: 1 | Status: SHIPPED | OUTPATIENT
Start: 2021-05-24 | End: 2021-11-17 | Stop reason: SDUPTHER

## 2021-05-28 DIAGNOSIS — R21 RASH: ICD-10-CM

## 2021-05-28 RX ORDER — DIAPER,BRIEF,INFANT-TODD,DISP
EACH MISCELLANEOUS
Qty: 56 G | Refills: 0 | Status: SHIPPED | OUTPATIENT
Start: 2021-05-28 | End: 2021-06-11

## 2021-06-01 DIAGNOSIS — E11.9 TYPE 2 DIABETES MELLITUS WITHOUT COMPLICATION, WITH LONG-TERM CURRENT USE OF INSULIN (HCC): ICD-10-CM

## 2021-06-01 DIAGNOSIS — Z79.4 TYPE 2 DIABETES MELLITUS WITHOUT COMPLICATION, WITH LONG-TERM CURRENT USE OF INSULIN (HCC): ICD-10-CM

## 2021-06-01 RX ORDER — INSULIN GLARGINE 300 U/ML
22 INJECTION, SOLUTION SUBCUTANEOUS DAILY
Qty: 15 ML | Refills: 0 | Status: CANCELLED | OUTPATIENT
Start: 2021-06-01

## 2021-06-01 RX ORDER — QUETIAPINE FUMARATE 300 MG/1
TABLET, FILM COATED ORAL
COMMUNITY
Start: 2021-05-19 | End: 2021-09-29 | Stop reason: DRUGHIGH

## 2021-06-01 RX ORDER — NALOXEGOL OXALATE 25 MG/1
TABLET, FILM COATED ORAL
COMMUNITY
Start: 2021-05-22 | End: 2022-01-26 | Stop reason: ALTCHOICE

## 2021-06-02 DIAGNOSIS — E11.9 TYPE 2 DIABETES MELLITUS WITHOUT COMPLICATION, WITH LONG-TERM CURRENT USE OF INSULIN (HCC): ICD-10-CM

## 2021-06-02 DIAGNOSIS — Z79.4 TYPE 2 DIABETES MELLITUS WITHOUT COMPLICATION, WITH LONG-TERM CURRENT USE OF INSULIN (HCC): ICD-10-CM

## 2021-06-02 RX ORDER — INSULIN GLARGINE 300 U/ML
22 INJECTION, SOLUTION SUBCUTANEOUS DAILY
Qty: 3 PEN | Refills: 1 | Status: SHIPPED | OUTPATIENT
Start: 2021-06-02 | End: 2021-11-20 | Stop reason: SDUPTHER

## 2021-06-03 DIAGNOSIS — M54.42 CHRONIC LOW BACK PAIN WITH BILATERAL SCIATICA, UNSPECIFIED BACK PAIN LATERALITY: ICD-10-CM

## 2021-06-03 DIAGNOSIS — G89.29 CHRONIC LOW BACK PAIN WITH BILATERAL SCIATICA, UNSPECIFIED BACK PAIN LATERALITY: ICD-10-CM

## 2021-06-03 DIAGNOSIS — M54.41 CHRONIC LOW BACK PAIN WITH BILATERAL SCIATICA, UNSPECIFIED BACK PAIN LATERALITY: ICD-10-CM

## 2021-06-05 ENCOUNTER — APPOINTMENT (EMERGENCY)
Dept: CT IMAGING | Facility: HOSPITAL | Age: 51
End: 2021-06-05
Payer: COMMERCIAL

## 2021-06-05 ENCOUNTER — HOSPITAL ENCOUNTER (OUTPATIENT)
Facility: HOSPITAL | Age: 51
Setting detail: OBSERVATION
Discharge: HOME | End: 2021-06-06
Attending: EMERGENCY MEDICINE | Admitting: HOSPITALIST
Payer: COMMERCIAL

## 2021-06-05 DIAGNOSIS — R10.13 EPIGASTRIC PAIN: ICD-10-CM

## 2021-06-05 DIAGNOSIS — R11.2 INTRACTABLE NAUSEA AND VOMITING: Primary | ICD-10-CM

## 2021-06-05 DIAGNOSIS — K31.84 GASTROPARESIS: ICD-10-CM

## 2021-06-05 LAB
ALBUMIN SERPL BCP-MCNC: 3.9 G/DL (ref 3.5–5)
ALP SERPL-CCNC: 171 U/L (ref 46–116)
ALT SERPL W P-5'-P-CCNC: 21 U/L (ref 12–78)
ANION GAP SERPL CALCULATED.3IONS-SCNC: 14 MMOL/L (ref 4–13)
AST SERPL W P-5'-P-CCNC: 18 U/L (ref 5–45)
BACTERIA UR QL AUTO: NORMAL /HPF
BASOPHILS # BLD AUTO: 0.03 THOUSANDS/ΜL (ref 0–0.1)
BASOPHILS NFR BLD AUTO: 0 % (ref 0–1)
BILIRUB DIRECT SERPL-MCNC: 0.05 MG/DL (ref 0–0.2)
BILIRUB SERPL-MCNC: 0.14 MG/DL (ref 0.2–1)
BILIRUB UR QL STRIP: NEGATIVE
BUN SERPL-MCNC: 32 MG/DL (ref 5–25)
CALCIUM SERPL-MCNC: 9.2 MG/DL (ref 8.3–10.1)
CHLORIDE SERPL-SCNC: 110 MMOL/L (ref 100–108)
CLARITY UR: CLEAR
CO2 SERPL-SCNC: 24 MMOL/L (ref 21–32)
COLOR UR: YELLOW
CREAT SERPL-MCNC: 1.91 MG/DL (ref 0.6–1.3)
EOSINOPHIL # BLD AUTO: 0.02 THOUSAND/ΜL (ref 0–0.61)
EOSINOPHIL NFR BLD AUTO: 0 % (ref 0–6)
ERYTHROCYTE [DISTWIDTH] IN BLOOD BY AUTOMATED COUNT: 13.1 % (ref 11.6–15.1)
EXT PREG TEST URINE: NEGATIVE
EXT. CONTROL ED NAV: NORMAL
GFR SERPL CREATININE-BSD FRML MDRD: 30 ML/MIN/1.73SQ M
GLUCOSE SERPL-MCNC: 122 MG/DL (ref 65–140)
GLUCOSE SERPL-MCNC: 152 MG/DL (ref 65–140)
GLUCOSE SERPL-MCNC: 158 MG/DL (ref 65–140)
GLUCOSE UR STRIP-MCNC: NEGATIVE MG/DL
HCT VFR BLD AUTO: 43.9 % (ref 34.8–46.1)
HGB BLD-MCNC: 14.2 G/DL (ref 11.5–15.4)
HGB UR QL STRIP.AUTO: ABNORMAL
IMM GRANULOCYTES # BLD AUTO: 0.06 THOUSAND/UL (ref 0–0.2)
IMM GRANULOCYTES NFR BLD AUTO: 1 % (ref 0–2)
KETONES UR STRIP-MCNC: NEGATIVE MG/DL
LEUKOCYTE ESTERASE UR QL STRIP: NEGATIVE
LIPASE SERPL-CCNC: 435 U/L (ref 73–393)
LYMPHOCYTES # BLD AUTO: 1.24 THOUSANDS/ΜL (ref 0.6–4.47)
LYMPHOCYTES NFR BLD AUTO: 10 % (ref 14–44)
MCH RBC QN AUTO: 30.8 PG (ref 26.8–34.3)
MCHC RBC AUTO-ENTMCNC: 32.3 G/DL (ref 31.4–37.4)
MCV RBC AUTO: 95 FL (ref 82–98)
MONOCYTES # BLD AUTO: 0.7 THOUSAND/ΜL (ref 0.17–1.22)
MONOCYTES NFR BLD AUTO: 6 % (ref 4–12)
NEUTROPHILS # BLD AUTO: 10.49 THOUSANDS/ΜL (ref 1.85–7.62)
NEUTS SEG NFR BLD AUTO: 83 % (ref 43–75)
NITRITE UR QL STRIP: NEGATIVE
NON-SQ EPI CELLS URNS QL MICRO: NORMAL /HPF
NRBC BLD AUTO-RTO: 0 /100 WBCS
PH UR STRIP.AUTO: 7 [PH] (ref 4.5–8)
PLATELET # BLD AUTO: 300 THOUSANDS/UL (ref 149–390)
PMV BLD AUTO: 10.5 FL (ref 8.9–12.7)
POTASSIUM SERPL-SCNC: 3.8 MMOL/L (ref 3.5–5.3)
PROT SERPL-MCNC: 8.3 G/DL (ref 6.4–8.2)
PROT UR STRIP-MCNC: ABNORMAL MG/DL
RBC # BLD AUTO: 4.61 MILLION/UL (ref 3.81–5.12)
RBC #/AREA URNS AUTO: NORMAL /HPF
SODIUM SERPL-SCNC: 148 MMOL/L (ref 136–145)
SP GR UR STRIP.AUTO: 1.02 (ref 1–1.03)
TROPONIN I SERPL-MCNC: <0.02 NG/ML
UROBILINOGEN UR QL STRIP.AUTO: 0.2 E.U./DL
WBC # BLD AUTO: 12.54 THOUSAND/UL (ref 4.31–10.16)
WBC #/AREA URNS AUTO: NORMAL /HPF

## 2021-06-05 PROCEDURE — 99232 SBSQ HOSP IP/OBS MODERATE 35: CPT | Performed by: PHYSICIAN ASSISTANT

## 2021-06-05 PROCEDURE — 85025 COMPLETE CBC W/AUTO DIFF WBC: CPT | Performed by: EMERGENCY MEDICINE

## 2021-06-05 PROCEDURE — 81001 URINALYSIS AUTO W/SCOPE: CPT

## 2021-06-05 PROCEDURE — C9113 INJ PANTOPRAZOLE SODIUM, VIA: HCPCS | Performed by: EMERGENCY MEDICINE

## 2021-06-05 PROCEDURE — 99285 EMERGENCY DEPT VISIT HI MDM: CPT

## 2021-06-05 PROCEDURE — 99220 PR INITIAL OBSERVATION CARE/DAY 70 MINUTES: CPT | Performed by: HOSPITALIST

## 2021-06-05 PROCEDURE — 81025 URINE PREGNANCY TEST: CPT | Performed by: EMERGENCY MEDICINE

## 2021-06-05 PROCEDURE — 96366 THER/PROPH/DIAG IV INF ADDON: CPT

## 2021-06-05 PROCEDURE — C9113 INJ PANTOPRAZOLE SODIUM, VIA: HCPCS | Performed by: HOSPITALIST

## 2021-06-05 PROCEDURE — 36415 COLL VENOUS BLD VENIPUNCTURE: CPT | Performed by: EMERGENCY MEDICINE

## 2021-06-05 PROCEDURE — 83690 ASSAY OF LIPASE: CPT | Performed by: EMERGENCY MEDICINE

## 2021-06-05 PROCEDURE — 80076 HEPATIC FUNCTION PANEL: CPT | Performed by: EMERGENCY MEDICINE

## 2021-06-05 PROCEDURE — 84484 ASSAY OF TROPONIN QUANT: CPT | Performed by: EMERGENCY MEDICINE

## 2021-06-05 PROCEDURE — 96374 THER/PROPH/DIAG INJ IV PUSH: CPT

## 2021-06-05 PROCEDURE — 82948 REAGENT STRIP/BLOOD GLUCOSE: CPT

## 2021-06-05 PROCEDURE — 99285 EMERGENCY DEPT VISIT HI MDM: CPT | Performed by: EMERGENCY MEDICINE

## 2021-06-05 PROCEDURE — 96372 THER/PROPH/DIAG INJ SC/IM: CPT

## 2021-06-05 PROCEDURE — C9113 INJ PANTOPRAZOLE SODIUM, VIA: HCPCS | Performed by: PHYSICIAN ASSISTANT

## 2021-06-05 PROCEDURE — 96365 THER/PROPH/DIAG IV INF INIT: CPT

## 2021-06-05 PROCEDURE — 96375 TX/PRO/DX INJ NEW DRUG ADDON: CPT

## 2021-06-05 PROCEDURE — 74176 CT ABD & PELVIS W/O CONTRAST: CPT

## 2021-06-05 PROCEDURE — 80048 BASIC METABOLIC PNL TOTAL CA: CPT | Performed by: EMERGENCY MEDICINE

## 2021-06-05 RX ORDER — METOCLOPRAMIDE HYDROCHLORIDE 5 MG/ML
10 INJECTION INTRAMUSCULAR; INTRAVENOUS ONCE
Status: DISCONTINUED | OUTPATIENT
Start: 2021-06-05 | End: 2021-06-05

## 2021-06-05 RX ORDER — DICYCLOMINE HCL 20 MG
20 TABLET ORAL EVERY 6 HOURS
Status: DISCONTINUED | OUTPATIENT
Start: 2021-06-05 | End: 2021-06-06 | Stop reason: HOSPADM

## 2021-06-05 RX ORDER — PROMETHAZINE HYDROCHLORIDE 25 MG/ML
12.5 INJECTION, SOLUTION INTRAMUSCULAR; INTRAVENOUS EVERY 6 HOURS PRN
Status: DISCONTINUED | OUTPATIENT
Start: 2021-06-05 | End: 2021-06-06 | Stop reason: HOSPADM

## 2021-06-05 RX ORDER — DIPHENHYDRAMINE HYDROCHLORIDE 50 MG/ML
25 INJECTION INTRAMUSCULAR; INTRAVENOUS ONCE
Status: COMPLETED | OUTPATIENT
Start: 2021-06-05 | End: 2021-06-05

## 2021-06-05 RX ORDER — SCOLOPAMINE TRANSDERMAL SYSTEM 1 MG/1
1 PATCH, EXTENDED RELEASE TRANSDERMAL
Status: DISCONTINUED | OUTPATIENT
Start: 2021-06-08 | End: 2021-06-06 | Stop reason: HOSPADM

## 2021-06-05 RX ORDER — FENTANYL CITRATE 50 UG/ML
50 INJECTION, SOLUTION INTRAMUSCULAR; INTRAVENOUS ONCE
Status: COMPLETED | OUTPATIENT
Start: 2021-06-05 | End: 2021-06-05

## 2021-06-05 RX ORDER — LUBIPROSTONE 8 UG/1
8 CAPSULE, GELATIN COATED ORAL 2 TIMES DAILY WITH MEALS
Status: DISCONTINUED | OUTPATIENT
Start: 2021-06-05 | End: 2021-06-06 | Stop reason: HOSPADM

## 2021-06-05 RX ORDER — ONDANSETRON 2 MG/ML
4 INJECTION INTRAMUSCULAR; INTRAVENOUS EVERY 4 HOURS PRN
Status: DISCONTINUED | OUTPATIENT
Start: 2021-06-05 | End: 2021-06-06 | Stop reason: HOSPADM

## 2021-06-05 RX ORDER — HALOPERIDOL 5 MG/ML
5 INJECTION INTRAMUSCULAR ONCE
Status: COMPLETED | OUTPATIENT
Start: 2021-06-05 | End: 2021-06-05

## 2021-06-05 RX ORDER — ONDANSETRON 2 MG/ML
4 INJECTION INTRAMUSCULAR; INTRAVENOUS ONCE AS NEEDED
Status: COMPLETED | OUTPATIENT
Start: 2021-06-05 | End: 2021-06-05

## 2021-06-05 RX ORDER — SODIUM CHLORIDE 9 MG/ML
100 INJECTION, SOLUTION INTRAVENOUS CONTINUOUS
Status: DISCONTINUED | OUTPATIENT
Start: 2021-06-05 | End: 2021-06-06 | Stop reason: HOSPADM

## 2021-06-05 RX ORDER — PANTOPRAZOLE SODIUM 40 MG/1
40 INJECTION, POWDER, FOR SOLUTION INTRAVENOUS EVERY 12 HOURS SCHEDULED
Status: DISCONTINUED | OUTPATIENT
Start: 2021-06-05 | End: 2021-06-06 | Stop reason: HOSPADM

## 2021-06-05 RX ORDER — LORAZEPAM 2 MG/ML
1 INJECTION INTRAMUSCULAR ONCE
Status: COMPLETED | OUTPATIENT
Start: 2021-06-05 | End: 2021-06-05

## 2021-06-05 RX ORDER — PANTOPRAZOLE SODIUM 40 MG/1
40 INJECTION, POWDER, FOR SOLUTION INTRAVENOUS
Status: DISCONTINUED | OUTPATIENT
Start: 2021-06-05 | End: 2021-06-05 | Stop reason: SDUPTHER

## 2021-06-05 RX ORDER — BUPROPION HYDROCHLORIDE 150 MG/1
300 TABLET ORAL EVERY MORNING
Status: DISCONTINUED | OUTPATIENT
Start: 2021-06-06 | End: 2021-06-06 | Stop reason: HOSPADM

## 2021-06-05 RX ORDER — ALBUTEROL SULFATE 90 UG/1
2 AEROSOL, METERED RESPIRATORY (INHALATION) EVERY 4 HOURS PRN
Status: DISCONTINUED | OUTPATIENT
Start: 2021-06-05 | End: 2021-06-06 | Stop reason: HOSPADM

## 2021-06-05 RX ORDER — METOCLOPRAMIDE HYDROCHLORIDE 5 MG/ML
10 INJECTION INTRAMUSCULAR; INTRAVENOUS ONCE
Status: COMPLETED | OUTPATIENT
Start: 2021-06-05 | End: 2021-06-05

## 2021-06-05 RX ORDER — SACCHAROMYCES BOULARDII 250 MG
250 CAPSULE ORAL 2 TIMES DAILY
Status: DISCONTINUED | OUTPATIENT
Start: 2021-06-05 | End: 2021-06-06 | Stop reason: HOSPADM

## 2021-06-05 RX ORDER — HYDRALAZINE HYDROCHLORIDE 20 MG/ML
10 INJECTION INTRAMUSCULAR; INTRAVENOUS EVERY 6 HOURS PRN
Status: DISCONTINUED | OUTPATIENT
Start: 2021-06-05 | End: 2021-06-06 | Stop reason: HOSPADM

## 2021-06-05 RX ORDER — SCOLOPAMINE TRANSDERMAL SYSTEM 1 MG/1
1 PATCH, EXTENDED RELEASE TRANSDERMAL
Status: DISCONTINUED | OUTPATIENT
Start: 2021-06-05 | End: 2021-06-05 | Stop reason: SDUPTHER

## 2021-06-05 RX ORDER — PANTOPRAZOLE SODIUM 40 MG/1
40 INJECTION, POWDER, FOR SOLUTION INTRAVENOUS ONCE
Status: COMPLETED | OUTPATIENT
Start: 2021-06-05 | End: 2021-06-05

## 2021-06-05 RX ORDER — ONDANSETRON 2 MG/ML
4 INJECTION INTRAMUSCULAR; INTRAVENOUS ONCE
Status: COMPLETED | OUTPATIENT
Start: 2021-06-05 | End: 2021-06-05

## 2021-06-05 RX ADMIN — ONDANSETRON 4 MG: 2 INJECTION INTRAMUSCULAR; INTRAVENOUS at 09:09

## 2021-06-05 RX ADMIN — ONDANSETRON 4 MG: 2 INJECTION INTRAMUSCULAR; INTRAVENOUS at 20:01

## 2021-06-05 RX ADMIN — PANTOPRAZOLE SODIUM 40 MG: 40 INJECTION, POWDER, FOR SOLUTION INTRAVENOUS at 22:33

## 2021-06-05 RX ADMIN — SCOPALAMINE 1 PATCH: 1 PATCH, EXTENDED RELEASE TRANSDERMAL at 12:05

## 2021-06-05 RX ADMIN — DIPHENHYDRAMINE HYDROCHLORIDE 25 MG: 50 INJECTION, SOLUTION INTRAMUSCULAR; INTRAVENOUS at 07:28

## 2021-06-05 RX ADMIN — LORAZEPAM 1 MG: 2 INJECTION INTRAMUSCULAR; INTRAVENOUS at 08:51

## 2021-06-05 RX ADMIN — SODIUM CHLORIDE 100 ML/HR: 0.9 INJECTION, SOLUTION INTRAVENOUS at 15:10

## 2021-06-05 RX ADMIN — PROMETHAZINE HYDROCHLORIDE 12.5 MG: 25 INJECTION INTRAMUSCULAR; INTRAVENOUS at 15:15

## 2021-06-05 RX ADMIN — PANTOPRAZOLE SODIUM 40 MG: 40 INJECTION, POWDER, FOR SOLUTION INTRAVENOUS at 12:26

## 2021-06-05 RX ADMIN — PANTOPRAZOLE SODIUM 40 MG: 40 INJECTION, POWDER, FOR SOLUTION INTRAVENOUS at 09:07

## 2021-06-05 RX ADMIN — SODIUM CHLORIDE, SODIUM LACTATE, POTASSIUM CHLORIDE, AND CALCIUM CHLORIDE 1000 ML: .6; .31; .03; .02 INJECTION, SOLUTION INTRAVENOUS at 07:39

## 2021-06-05 RX ADMIN — ONDANSETRON 4 MG: 2 INJECTION INTRAMUSCULAR; INTRAVENOUS at 07:44

## 2021-06-05 RX ADMIN — HALOPERIDOL LACTATE 5 MG: 5 INJECTION, SOLUTION INTRAMUSCULAR at 10:23

## 2021-06-05 RX ADMIN — FENTANYL CITRATE 50 MCG: 50 INJECTION INTRAMUSCULAR; INTRAVENOUS at 07:56

## 2021-06-05 RX ADMIN — METOCLOPRAMIDE 10 MG: 5 INJECTION, SOLUTION INTRAMUSCULAR; INTRAVENOUS at 07:29

## 2021-06-05 NOTE — ED NOTES
Pt unable to answer triage questions at this time stating that she cant talk because she has too much pain and feels too sick     Irvin López, IGAN  06/05/21 0831

## 2021-06-05 NOTE — PLAN OF CARE
Problem: Potential for Falls  Goal: Patient will remain free of falls  Description: INTERVENTIONS:  - Assess patient frequently for physical needs  -  Identify cognitive and physical deficits and behaviors that affect risk of falls    -  [place] fall precautions as indicated by assessment   - Educate patient/family on patient safety including physical limitations  - Instruct patient to call for assistance with activity based on assessment  - Modify environment to reduce risk of injury  - Consider OT/PT consult to assist with strengthening/mobility  Outcome: Progressing     Problem: GASTROINTESTINAL - ADULT  Goal: Minimal or absence of nausea and/or vomiting  Description: INTERVENTIONS:  - Administer IV fluids if ordered to ensure adequate hydration  - Maintain NPO status until nausea and vomiting are resolved  - Nasogastric tube if ordered  - Administer ordered antiemetic medications as needed  - Provide nonpharmacologic comfort measures as appropriate  - Advance diet as tolerated, if ordered  - Consider nutrition services referral to assist patient with adequate nutrition and appropriate food choices  Outcome: Progressing  Goal: Maintains or returns to baseline bowel function  Description: INTERVENTIONS:  - Assess bowel function  - Encourage oral fluids to ensure adequate hydration  - Administer IV fluids if ordered to ensure adequate hydration  - Administer ordered medications as needed  - Encourage mobilization and activity  - Consider nutritional services referral to assist patient with adequate nutrition and appropriate food choices  Outcome: Progressing  Goal: Maintains adequate nutritional intake  Description: INTERVENTIONS:  - Monitor percentage of each meal consumed  - Identify factors contributing to decreased intake, treat as appropriate  - Assist with meals as needed  - Monitor I&O, weight, and lab values if indicated  - Obtain nutrition services referral as needed  Outcome: Progressing     Problem: METABOLIC, FLUID AND ELECTROLYTES - ADULT  Goal: Electrolytes maintained within normal limits  Description: INTERVENTIONS:  - Monitor labs and assess patient for signs and symptoms of electrolyte imbalances  - Administer electrolyte replacement as ordered  - Monitor response to electrolyte replacements, including repeat lab results as appropriate  - Instruct patient on fluid and nutrition as appropriate  Outcome: Progressing  Goal: Fluid balance maintained  Description: INTERVENTIONS:  - Monitor labs   - Monitor I/O and WT  - Instruct patient on fluid and nutrition as appropriate  - Assess for signs & symptoms of volume excess or deficit  Outcome: Progressing  Goal: Glucose maintained within target range  Description: INTERVENTIONS:  - Monitor Blood Glucose as ordered  - Assess for signs and symptoms of hyperglycemia and hypoglycemia  - Administer ordered medications to maintain glucose within target range  - Assess nutritional intake and initiate nutrition service referral as needed  Outcome: Progressing     Problem: PAIN - ADULT  Goal: Verbalizes/displays adequate comfort level or baseline comfort level  Description: Interventions:  - Encourage patient to monitor pain and request assistance  - Assess pain using appropriate pain scale  - Administer analgesics based on type and severity of pain and evaluate response  - Implement non-pharmacological measures as appropriate and evaluate response  - Consider cultural and social influences on pain and pain management  - Notify physician/advanced practitioner if interventions unsuccessful or patient reports new pain  Outcome: Progressing     Problem: SAFETY ADULT  Goal: Patient will remain free of falls  Description: INTERVENTIONS:  - Assess patient frequently for physical needs  -  Identify cognitive and physical deficits and behaviors that affect risk of falls    -  [place] fall precautions as indicated by assessment   - Educate patient/family on patient safety including physical limitations  - Instruct patient to call for assistance with activity based on assessment  - Modify environment to reduce risk of injury  - Consider OT/PT consult to assist with strengthening/mobility  Outcome: Progressing  Goal: Maintain or return to baseline ADL function  Description: INTERVENTIONS:  -  Assess patient's ability to carry out ADLs; assess patient's baseline for ADL function and identify physical deficits which impact ability to perform ADLs (bathing, care of mouth/teeth, toileting, grooming, dressing, etc )  - Assess/evaluate cause of self-care deficits   - Assess range of motion  - Assess patient's mobility; develop plan if impaired  - Assess patient's need for assistive devices and provide as appropriate  - Encourage maximum independence but intervene and supervise when necessary  - Involve family in performance of ADLs  - Assess for home care needs following discharge   - Consider OT consult to assist with ADL evaluation and planning for discharge  - Provide patient education as appropriate  Outcome: Progressing     Problem: DISCHARGE PLANNING  Goal: Discharge to home or other facility with appropriate resources  Description: INTERVENTIONS:  - Identify barriers to discharge w/patient and caregiver  - Arrange for needed discharge resources and transportation as appropriate  - Identify discharge learning needs (meds, wound care, etc )  - Arrange for interpretive services to assist at discharge as needed  - Refer to Case Management Department for coordinating discharge planning if the patient needs post-hospital services based on physician/advanced practitioner order or complex needs related to functional status, cognitive ability, or social support system  Outcome: Progressing

## 2021-06-05 NOTE — CONSULTS
Patient MRN: [id number]  Date of Service: 6/5/2021  Referring Physician: Naga Hernandez DO  Provider Creating Note: Reji Soria PA-C  PCP: Adriana Jaime  Reason for Consult:   HPI  Basilia Vega is a 48 y o  female who was admitted with abdominal pain  She has a history of diabetes and gastroparesis, status post gastric pacemaker and pyloroplasty  She follows with Dr Mae Hair at [facility] and locally with [facility]  She was in their office 5/10  She had an upper endoscopy 5/13 that showed an irregular Z-line but biopsies were negative for Connelly's  Gastric biopsies were negative for H pylori  She has multiple ER visits and hospitalizations for abdominal pain, nausea, vomiting  Most recently 4/29 through 5/1, then in the emergency room 5/5 and 5/9  She is extremely agitated, rolling in bed, sitting up and lying down  Complains of diffuse abdominal pain, nausea vomiting  States she vomited while here in the ER  Her appetite is decreased and she has lost 6 lb in the last 6 months  She has been on multiple medications for nausea and vomiting although it is unclear how compliant she has been  When asked which medications provide relief, she states she does not know        Past Medical History:   Diagnosis Date    ADHD     Anemia of chronic disease     Anxiety     Asthma     Asthma     Borderline personality disorder (HCC)     Cataplexy     Chronic abdominal pain     CKD (chronic kidney disease) stage 3, GFR 30-59 ml/min (HCC)     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HCC)     Cushing syndrome ([address] )     Diabetes mellitus ([facility] [address] )     DVT (deep venous thrombosis) (HCC)     GERD (gastroesophageal reflux disease)     History of acute pancreatitis     felt secondary to Bactrim    History of transfusion     HTN (hypertension)     Hypertension     Liver disease     fatty liver    Microscopic polyangiitis (HCC)     Morbid obesity (HCC)     MPA (microscopic polyangiitis) (HCC)     Ovarian cyst     PTSD (post-traumatic stress disorder)     Renal disorder     Self-inflicted injury     self inflicted skin wounds    Wegener's granulomatosis with renal involvement ([facility] [address] ) 2015     Past Surgical History:   Procedure Laterality Date    COLONOSCOPY      ESOPHAGOGASTRODUODENOSCOPY  09/11/2015    mild antral gastritis    GASTRIC STIMULATOR IMPLANT SURGERY  06/25/2020    SD COLONOSCOPY FLX DX W/COLLJ SPEC WHEN PFRMD N/A 12/14/2018    Procedure: COLONOSCOPY with polypectomy;  Surgeon: Isha Noland MD;  Location: [facility]; Service: Gastroenterology    SD ESOPHAGOGASTRODUODENOSCOPY TRANSORAL DIAGNOSTIC N/A 12/14/2018    Procedure: ESOPHAGOGASTRODUODENOSCOPY (EGD) with biopsy;  Surgeon: Isha Noland MD;  Location: [facility]; Service: Gastroenterology    RELEASE SCAR CONTRACTURE / GRAFT REPAIRS OF HAND Bilateral     UPPER GASTROINTESTINAL ENDOSCOPY  12/26/2019     Medications  Home Medications:   Prior to Admission medications    Medication Sig Start Date End Date Taking?  Authorizing Provider   albuterol (PROVENTIL HFA,VENTOLIN HFA) 90 mcg/act inhaler INHALE TWO PUFFS BY MOUTH EVERY 6 HOURS AS NEEDED FOR WHEEZING OR SHORTNESS OF BREATH 5/10/21   Katarzyna Thrasher PA-C   Alcohol Swabs (ALCOHOL PADS) 70 % PADS by Does not apply route 4 (four) times a day 6/7/19   Justine Todd PA-C   amLODIPine (NORVASC) 5 mg tablet TAKE ONE TABLET BY MOUTH ONCE DAILY 12/29/20   Neville Sellers DO   amphetamine-dextroamphetamine (ADDERALL XR) 20 MG 24 hr capsule Take 1 capsule (20 mg total) by mouth 2 (two) times a dayMax Daily Amount: 40 mg 5/19/21   Katarzyna Thrasher PA-C   atorvastatin (LIPITOR) 20 mg tablet Take 1 tablet (20 mg total) by mouth daily 3/3/21   Katarzyna Thrasher PA-C   Blood Glucose Monitoring Suppl (FREESTYLE LITE) DEIRDRE by Does not apply route daily 5/21/19   Justine Todd PA-C   buPROPion (WELLBUTRIN XL) 300 mg 24 hr tablet Take 300 mg by mouth every morning  12/4/20   Historical Provider, MD conjugated estrogens (PREMARIN) vaginal cream Insert 1 g into the vagina 2 (two) times a week 5/20/21   PRASANTH Lemon   cycloSPORINE (RESTASIS) 0 05 % ophthalmic emulsion Administer 1 drop to both eyes 2 (two) times a day    Historical Provider, MD   dexlansoprazole (Dexilant) 60 MG capsule Take 1 capsule (60 mg total) by mouth daily 2/18/21   Sun Lambert PA-C   dicyclomine (BENTYL) 10 mg capsule  11/27/20   Historical Provider, MD   dicyclomine (BENTYL) 20 mg tablet Take 1 tablet (20 mg total) by mouth every 6 (six) hours 5/9/21   Anthony Alegre PA-C   diphenoxylate-atropine (LOMOTIL) 2 5-0 025 mg per tablet Take 1 tablet by mouth 4 (four) times a day as needed for diarrhea 5/10/21   Jin Goddard PA-C    MG capsule TAKE ONE CAPSULE BY MOUTH TWICE A DAY 5/24/21   Katarzyna Thrasher PA-C   Easy Comfort Lancets MISC USE TO TEST THE BLOOD SUGAR THREE TIMES A DAY 3/1/21   Katarzyna Thrasher PA-C   FREESTYLE LITE test strip USE TO TEST THE BLOOD SUGAR THREE TIMES A DAY 4/1/21   Katarzyna Thrasher PA-C   hydrocortisone 1 % cream APPLY TOPICALLY FOUR TIMES A DAY AS NEEDED FOR IRRITATION 5/28/21   Katarzyna Thrasher PA-C   insulin aspart (NovoLOG FlexPen) 100 UNIT/ML injection pen Inject 6 Units under the skin 3 (three) times a day with meals 9/24/20   Katarzyna Thrasher PA-C   insulin glargine (Toujeo Max SoloStar) 300 units/mL CONCENTRATED U-300 injection pen (2-unit dial) Inject 22 Units under the skin daily 6/2/21   Katarzyna Thrasher PA-C   Insulin Pen Needle (PEN NEEDLES) 31G X 8 MM MISC Inject 1 Stick under the skin 4 (four) times a day (with meals and at bedtime) 3/28/18   Dhara Asif PA-C   lidocaine (XYLOCAINE) 5 % ointment APPLY TOPICALLY 2GM TWICE A DAY TO AFFECTED AREAS AS NEEDED FOR MILD PAIN 12/10/20   Katarzyna Thrasher PA-C   linaCLOtide (Linzess) 72 MCG CAPS Take 72 mcg by mouth daily 2/18/21   Sun Lambert PA-C   loperamide (IMODIUM) 2 mg capsule Take 1 capsule (2 mg total) by mouth 4 (four) times a day as needed for diarrhea 5/5/21   Rosa Gross PA-C   montelukast (SINGULAIR) 10 mg tablet Take 1 tablet (10 mg total) by mouth daily at bedtime 5/14/21   Katarzyna Thrasher PA-C   Movantik 25 MG tablet  5/22/21   Historical Provider, MD   Nutritional Supplements (Ensure Original) LIQD Take 1 Bottle by mouth 2 (two) times a day 10/13/20   Katarzyna Thrasher PA-C   ondansetron (ZOFRAN) 4 mg tablet Take 1 tablet (4 mg total) by mouth every 8 (eight) hours as needed for nausea or vomiting 9/24/20   Katarzyna Thrasher PA-C   oxyCODONE-acetaminophen (PERCOCET)  mg per tablet Take 1 tablet by mouth every 6 (six) hours as needed for moderate painMax Daily Amount: 4 tablets 5/10/21   Katarzyna Thrasher PA-C   pantoprazole (PROTONIX) 40 mg tablet Take 1 tablet (40 mg total) by mouth 2 (two) times a day before meals 8/26/20   Jane Soria PA-C   Probiotic Product (PROBIOTIC-10 PO) Take 1 tablet by mouth daily    Historical Provider, MD   promethazine (PHENERGAN) 25 mg tablet Take 1 tablet (25 mg total) by mouth every 6 (six) hours as needed for nausea or vomiting 5/10/21   Manoj Jaeger PA-C   QUEtiapine (SEROquel) 200 mg tablet Take 150 mg by mouth daily at bedtime     Historical Provider, MD   QUEtiapine (SEROquel) 300 mg tablet  5/19/21   Historical Provider, MD   scopolamine (TRANSDERM-SCOP) 1 5 mg/3 days TD 72 hr patch Place 1 patch on the skin every third day 4/27/21   Taco Nguyen PA-C   sucralfate (CARAFATE) 1 g/10 mL suspension Take 10 mL (1 g total) by mouth 4 (four) times a day 5/9/21   Yobany Villanueva PA-C       Inhouse Medications  No current facility-administered medications for this encounter       Current Outpatient Medications:     albuterol (PROVENTIL HFA,VENTOLIN HFA) 90 mcg/act inhaler    Alcohol Swabs (ALCOHOL PADS) 70 % PADS    amLODIPine (NORVASC) 5 mg tablet    amphetamine-dextroamphetamine (ADDERALL XR) 20 MG 24 hr capsule    atorvastatin (LIPITOR) 20 mg tablet   Blood Glucose Monitoring Suppl (FREESTYLE LITE) DEIRDRE    buPROPion (WELLBUTRIN XL) 300 mg 24 hr tablet    conjugated estrogens (PREMARIN) vaginal cream    cycloSPORINE (RESTASIS) 0 05 % ophthalmic emulsion    dexlansoprazole (Dexilant) 60 MG capsule    dicyclomine (BENTYL) 10 mg capsule    dicyclomine (BENTYL) 20 mg tablet    diphenoxylate-atropine (LOMOTIL) 2 5-0 025 mg per tablet     MG capsule    Easy Comfort Lancets MISC    FREESTYLE LITE test strip    hydrocortisone 1 % cream    insulin aspart (NovoLOG FlexPen) 100 UNIT/ML injection pen    insulin glargine (Toujeo Max SoloStar) 300 units/mL CONCENTRATED U-300 injection pen (2-unit dial)    Insulin Pen Needle (PEN NEEDLES) 31G X 8 MM MISC    lidocaine (XYLOCAINE) 5 % ointment    linaCLOtide (Linzess) 72 MCG CAPS    loperamide (IMODIUM) 2 mg capsule    montelukast (SINGULAIR) 10 mg tablet    Movantik 25 MG tablet    Nutritional Supplements (Ensure Original) LIQD    ondansetron (ZOFRAN) 4 mg tablet    oxyCODONE-acetaminophen (PERCOCET)  mg per tablet    pantoprazole (PROTONIX) 40 mg tablet    Probiotic Product (PROBIOTIC-10 PO)    promethazine (PHENERGAN) 25 mg tablet    QUEtiapine (SEROquel) 200 mg tablet    QUEtiapine (SEROquel) 300 mg tablet    scopolamine (TRANSDERM-SCOP) 1 5 mg/3 days TD 72 hr patch    sucralfate (CARAFATE) 1 g/10 mL suspension    Allergies  Allergies   Allergen Reactions    Prozac [Fluoxetine Hcl]      SI    Bactrim [Sulfamethoxazole-Trimethoprim]      Pt "They think that is what cause the pancreatitis"     Flagyl [Metronidazole] Diarrhea and Abdominal Pain    Lamictal [Lamotrigine] GI Intolerance    Lithium Other (See Comments)    Haldol [Haloperidol] Other (See Comments)     "I don't like it"    Ibuprofen     Lexapro [Escitalopram Oxalate] Rash    Navane [Thiothixene]      SI    Other      "novaine?" antipsychotic     Social History   reports that she quit smoking about 10 years ago   She has never used smokeless tobacco  She reports current drug use  Drug: Marijuana  She reports that she does not drink alcohol  Family History  Family History   Problem Relation Age of Onset    No Known Problems Mother     No Known Problems Father     Colon cancer Neg Hx     Drug abuse Neg Hx         mother father    Mental illness Neg Hx         disorder, mother father    Cancer Neg Hx     Breast cancer Neg Hx      ROS  ROS: Reports:  Abdominal pain, nausea, vomiting, weight loss  Denies chest pain, dyspnea, fever All others negative except as noted in HPI  Objective   Vitals  /97 (BP Location: Right arm)   Pulse 90   Temp 97 5 °F (36 4 °C) (Oral)   Resp 18   LMP  (LMP Unknown)   SpO2 100%   General: Alert, agitated  Eyes:  No scleral icterus  ENT:  Mucous membranes moist  Card:  Regular rhythm  Lungs: Clear to ascultation b/l  No wheezes, rales, rhonchi  Abdomen:  Soft  Nondistended  Bowel sounds normoactive    Tender in epigastric region without rebound or guarding  Skin:  No jaundice  Extremities:  No edema  Neuro: Alert and oriented x3    Laboratory Studies  Lab Results   Component Value Date    WBC 12 54 (H) 06/05/2021    HGB 14 2 06/05/2021    HCT 43 9 06/05/2021     06/05/2021    MCV 95 06/05/2021     Lab Results   Component Value Date    CREATININE 1 91 (H) 06/05/2021    BUN 32 (H) 06/05/2021    SODIUM 148 (H) 06/05/2021    K 3 8 06/05/2021     (H) 06/05/2021    CO2 24 06/05/2021    GLUC 122 06/05/2021    CALCIUM 9 2 06/05/2021    ALKPHOS 171 (H) 06/05/2021    ALB 3 9 06/05/2021    TBILI 0 14 (L) 06/05/2021    AST 18 06/05/2021    ALT 21 06/05/2021     Lab Results   Component Value Date    PROTIME 13 7 04/26/2021    INR 1 07 04/26/2021       Imaging and Other Studies  CT A/P 6/5/2021 noncontrast    FINDINGS:  ABDOMEN  LOWER CHEST:  No clinically significant abnormality identified in the visualized lower chest            LIVER/BILIARY TREE:  Unremarkable         GALLBLADDER: No calcified gallstones  No pericholecystic inflammatory change            SPLEEN:  Unremarkable         PANCREAS:  Unremarkable         ADRENAL GLANDS:    Right adrenal gland is normal      Stable 1 5 cm low-density lesion in the body of the left adrenal gland with Hounsfield units measuring -5 (series 2, image 19)  Findings most compatible with a benign adrenal adenoma  No further imaging is necessary         KIDNEYS/URETERS:  Unremarkable  No hydronephrosis           STOMACH AND BOWEL:    Evaluation of the GI tract limited due to lack of oral contrast material      Stomach incompletely distended and filled with ingested food products and air  Hiatal hernia  Gastric stimulator      No evidence of small bowel obstruction      The colon is relatively decompressed and inadequately evaluated         APPENDIX: Multiple appendicoliths  No findings to suggest appendicitis           ABDOMINOPELVIC CAVITY:  No ascites  No pneumoperitoneum  No lymphadenopathy         VESSELS:  Atherosclerotic changes are present  No evidence of aneurysm               PELVIS  REPRODUCTIVE ORGANS:  Unremarkable for patient's age         URINARY BLADDER:  Unremarkable            ABDOMINAL WALL/INGUINAL REGIONS:  There is a small fat-containing umbilical hernia         OSSEOUS STRUCTURES:  No acute fracture or destructive osseous lesion  Spinal degenerative changes are noted               IMPRESSION:  No acute CT abnormality to account for the patient's abdominal symptoms        Assessment and Plan:  1  Gastroparesis, status post gastric stimulator  Keep NPO for now  Consider scopolamine patch as suggested by Dr Annita Rivers  Alternately could use Phenergan and Zofran  Pantoprazole 40 mg daily  2  Abdominal pain, possibly secondary to gastroparesis  No acute abnormality on CT  Would avoid narcotics if possible    Active Problems:    * No active hospital problems   *      Jane Soria PA-C

## 2021-06-05 NOTE — ED PROVIDER NOTES
History  Chief Complaint   Patient presents with    Vomiting     Pt brought in by EMS for vomiting  Hx gastric stimulator and pancreatitis  Vomiting since 04:00 this am     49 yo F h/o gastroparesis s/p pacemaker, IBS presenting for evaluation of abdominal pain/nausea/vomiting  Sx started early this morning  Pt is rolling around stretcher and retching  She is unable to provide good history  Pain to upper abdomen with radiation to back  She saw GI for follow up on 5/10 and is on PPI BID, Carafate, Phenergan  EGD on 5/13 WNL  MDM: 49 yo F with abdominal pain/N/V- symptomatic treatment, labs, CT A/P, reassess                     Prior to Admission Medications   Prescriptions Last Dose Informant Patient Reported? Taking?    Alcohol Swabs (ALCOHOL PADS) 70 % PADS  Self No No   Sig: by Does not apply route 4 (four) times a day   Blood Glucose Monitoring Suppl (FREESTYLE LITE) DEIRDRE  Self No No   Sig: by Does not apply route daily    MG capsule   No No   Sig: TAKE ONE CAPSULE BY MOUTH TWICE A DAY   Easy Comfort Lancets MISC   No No   Sig: USE TO TEST THE BLOOD SUGAR THREE TIMES A DAY   FREESTYLE LITE test strip   No No   Sig: USE TO TEST THE BLOOD SUGAR THREE TIMES A DAY   Insulin Pen Needle (PEN NEEDLES) 31G X 8 MM MISC  Self No No   Sig: Inject 1 Stick under the skin 4 (four) times a day (with meals and at bedtime)   Movantik 25 MG tablet   Yes No   Nutritional Supplements (Ensure Original) LIQD   No No   Sig: Take 1 Bottle by mouth 2 (two) times a day   Probiotic Product (PROBIOTIC-10 PO)  Self Yes No   Sig: Take 1 tablet by mouth daily   QUEtiapine (SEROquel) 200 mg tablet   Yes No   Sig: Take 150 mg by mouth daily at bedtime    QUEtiapine (SEROquel) 300 mg tablet   Yes No   albuterol (PROVENTIL HFA,VENTOLIN HFA) 90 mcg/act inhaler   No No   Sig: INHALE TWO PUFFS BY MOUTH EVERY 6 HOURS AS NEEDED FOR WHEEZING OR SHORTNESS OF BREATH   amLODIPine (NORVASC) 5 mg tablet   No No   Sig: TAKE ONE TABLET BY MOUTH ONCE DAILY   amphetamine-dextroamphetamine (ADDERALL XR) 20 MG 24 hr capsule   No No   Sig: Take 1 capsule (20 mg total) by mouth 2 (two) times a dayMax Daily Amount: 40 mg   atorvastatin (LIPITOR) 20 mg tablet   No No   Sig: Take 1 tablet (20 mg total) by mouth daily   buPROPion (WELLBUTRIN XL) 300 mg 24 hr tablet   Yes No   Sig: Take 300 mg by mouth every morning    conjugated estrogens (PREMARIN) vaginal cream   No No   Sig: Insert 1 g into the vagina 2 (two) times a week   cycloSPORINE (RESTASIS) 0 05 % ophthalmic emulsion  Self Yes No   Sig: Administer 1 drop to both eyes 2 (two) times a day   dexlansoprazole (Dexilant) 60 MG capsule   No No   Sig: Take 1 capsule (60 mg total) by mouth daily   dicyclomine (BENTYL) 10 mg capsule   Yes No   dicyclomine (BENTYL) 20 mg tablet   No No   Sig: Take 1 tablet (20 mg total) by mouth every 6 (six) hours   diphenoxylate-atropine (LOMOTIL) 2 5-0 025 mg per tablet   No No   Sig: Take 1 tablet by mouth 4 (four) times a day as needed for diarrhea   hydrocortisone 1 % cream   No No   Sig: APPLY TOPICALLY FOUR TIMES A DAY AS NEEDED FOR IRRITATION   insulin aspart (NovoLOG FlexPen) 100 UNIT/ML injection pen   No No   Sig: Inject 6 Units under the skin 3 (three) times a day with meals   insulin glargine (Toujeo Max SoloStar) 300 units/mL CONCENTRATED U-300 injection pen (2-unit dial)   No No   Sig: Inject 22 Units under the skin daily   lidocaine (XYLOCAINE) 5 % ointment   No No   Sig: APPLY TOPICALLY 2GM TWICE A DAY TO AFFECTED AREAS AS NEEDED FOR MILD PAIN   linaCLOtide (Linzess) 72 MCG CAPS   No No   Sig: Take 72 mcg by mouth daily   loperamide (IMODIUM) 2 mg capsule   No No   Sig: Take 1 capsule (2 mg total) by mouth 4 (four) times a day as needed for diarrhea   montelukast (SINGULAIR) 10 mg tablet   No No   Sig: Take 1 tablet (10 mg total) by mouth daily at bedtime   ondansetron (ZOFRAN) 4 mg tablet   No No   Sig: Take 1 tablet (4 mg total) by mouth every 8 (eight) hours as needed for nausea or vomiting   oxyCODONE-acetaminophen (PERCOCET)  mg per tablet   No No   Sig: Take 1 tablet by mouth every 6 (six) hours as needed for moderate painMax Daily Amount: 4 tablets   pantoprazole (PROTONIX) 40 mg tablet   No No   Sig: Take 1 tablet (40 mg total) by mouth 2 (two) times a day before meals   promethazine (PHENERGAN) 25 mg tablet   No No   Sig: Take 1 tablet (25 mg total) by mouth every 6 (six) hours as needed for nausea or vomiting   scopolamine (TRANSDERM-SCOP) 1 5 mg/3 days TD 72 hr patch   No No   Sig: Place 1 patch on the skin every third day   sucralfate (CARAFATE) 1 g/10 mL suspension   No No   Sig: Take 10 mL (1 g total) by mouth 4 (four) times a day      Facility-Administered Medications: None       Past Medical History:   Diagnosis Date    ADHD     Anemia of chronic disease     Anxiety     Asthma     Asthma     Borderline personality disorder (HCC)     Cataplexy     Chronic abdominal pain     CKD (chronic kidney disease) stage 3, GFR 30-59 ml/min (HCC)     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     Cushing syndrome ([facility]     Diabetes mellitus ([facility]     DVT (deep venous thrombosis) (HCC)     GERD (gastroesophageal reflux disease)     History of acute pancreatitis     felt secondary to Bactrim    History of transfusion     HTN (hypertension)     Hypertension     Liver disease     fatty liver    Microscopic polyangiitis (HCC)     Morbid obesity (HCC)     MPA (microscopic polyangiitis) (HCC)     Ovarian cyst     PTSD (post-traumatic stress disorder)     Renal disorder     Self-inflicted injury     self inflicted skin wounds    Wegener's granulomatosis with renal involvement ([facility] ) 2015       Past Surgical History:   Procedure Laterality Date    COLONOSCOPY      ESOPHAGOGASTRODUODENOSCOPY  09/11/2015    mild antral gastritis    GASTRIC STIMULATOR IMPLANT SURGERY  06/25/2020    VT COLONOSCOPY FLX DX W/COLLJ SPEC WHEN PFRMD N/A 12/14/2018    Procedure: COLONOSCOPY with polypectomy;  Surgeon: Jane Emerson MD;  Location: [facility]; Service: Gastroenterology    OK ESOPHAGOGASTRODUODENOSCOPY TRANSORAL DIAGNOSTIC N/A 12/14/2018    Procedure: ESOPHAGOGASTRODUODENOSCOPY (EGD) with biopsy;  Surgeon: Jane Emerson MD;  Location: [facility]; Service: Gastroenterology    RELEASE SCAR CONTRACTURE / GRAFT REPAIRS OF HAND Bilateral     UPPER GASTROINTESTINAL ENDOSCOPY  12/26/2019       Family History   Problem Relation Age of Onset    No Known Problems Mother     No Known Problems Father     Colon cancer Neg Hx     Drug abuse Neg Hx         mother father    Mental illness Neg Hx         disorder, mother father    Cancer Neg Hx     Breast cancer Neg Hx      I have reviewed and agree with the history as documented  E-Cigarette/Vaping    E-Cigarette Use Never User      E-Cigarette/Vaping Substances    Nicotine No     THC No     CBD No      Social History     Tobacco Use    Smoking status: Former Smoker     Quit date: 2011     Years since quitting: 10 4    Smokeless tobacco: Never Used    Tobacco comment: marijuana   Substance Use Topics    Alcohol use: Never     Frequency: Never     Binge frequency: Never    Drug use: Yes     Types: Marijuana     Comment: marijuana daily       Review of Systems   Constitutional: Negative for chills, fever and unexpected weight change  HENT: Negative for ear pain, rhinorrhea and sore throat  Eyes: Negative for pain and visual disturbance  Respiratory: Negative for cough and shortness of breath  Cardiovascular: Negative for chest pain and leg swelling  Gastrointestinal: Positive for abdominal pain, nausea and vomiting  Negative for constipation and diarrhea  Endocrine: Negative for polydipsia, polyphagia and polyuria  Genitourinary: Negative for dysuria, frequency, hematuria and urgency  Musculoskeletal: Negative for back pain, myalgias and neck pain  Skin: Negative for color change and rash     Allergic/Immunologic: Negative for environmental allergies and immunocompromised state  Neurological: Negative for dizziness, weakness, light-headedness, numbness and headaches  Hematological: Negative for adenopathy  Does not bruise/bleed easily  Psychiatric/Behavioral: Negative for agitation and confusion  All other systems reviewed and are negative  Physical Exam  Physical Exam  Vitals signs and nursing note reviewed  Constitutional:       Appearance: She is well-developed  Comments: Appears uncomfortable, rolling around stretcher   HENT:      Head: Normocephalic and atraumatic  Nose: Nose normal    Eyes:      Conjunctiva/sclera: Conjunctivae normal    Neck:      Musculoskeletal: Normal range of motion and neck supple  Cardiovascular:      Rate and Rhythm: Normal rate and regular rhythm  Heart sounds: Normal heart sounds  Pulmonary:      Effort: Pulmonary effort is normal  No respiratory distress  Breath sounds: Normal breath sounds  No stridor  No wheezing or rales  Chest:      Chest wall: No tenderness  Abdominal:      General: There is no distension  Palpations: Abdomen is soft  Tenderness: There is abdominal tenderness  There is no guarding or rebound  Comments: Diffuse tenderness to palpation, no rebound/guarding, no back/CVA ttp   Musculoskeletal:         General: No tenderness or deformity  Skin:     General: Skin is warm and dry  Findings: No rash  Neurological:      General: No focal deficit present  Mental Status: She is alert and oriented to person, place, and time  Motor: No abnormal muscle tone  Coordination: Coordination normal    Psychiatric:         Thought Content:  Thought content normal          Judgment: Judgment normal          Vital Signs  ED Triage Vitals   Temperature Pulse Respirations Blood Pressure SpO2   06/05/21 0907 06/05/21 0713 06/05/21 0713 06/05/21 0713 06/05/21 0713   97 5 °F (36 4 °C) 81 18 (!) 175/96 98 %      Temp Source Heart Rate Source Patient Position - Orthostatic VS BP Location FiO2 (%)   06/05/21 0907 06/05/21 0713 06/05/21 0713 06/05/21 0713 --   Oral Monitor Lying Right arm       Pain Score       06/05/21 0756       Worst Possible Pain           Vitals:    06/05/21 1012 06/05/21 1242 06/05/21 1259 06/05/21 1459   BP: 141/97 (!) 180/99 155/90 (!) 160/110   Pulse: 90 85 64 73   Patient Position - Orthostatic VS: Sitting Lying Lying Lying         Visual Acuity      ED Medications  Medications   buPROPion (WELLBUTRIN XL) 24 hr tablet 300 mg (has no administration in time range)   dicyclomine (BENTYL) tablet 20 mg (has no administration in time range)   linaCLOtide CAPS 72 mcg (has no administration in time range)   saccharomyces boulardii (FLORASTOR) capsule 250 mg (has no administration in time range)   QUEtiapine (SEROquel) tablet 150 mg (has no administration in time range)   scopolamine (TRANSDERM-SCOP) 1 5 mg/3 days TD 72 hr patch 1 patch (has no administration in time range)   albuterol (PROVENTIL HFA,VENTOLIN HFA) inhaler 2 puff (has no administration in time range)   pantoprazole (PROTONIX) injection 40 mg (has no administration in time range)   ondansetron (ZOFRAN) injection 4 mg (has no administration in time range)   promethazine (PHENERGAN) injection 12 5 mg (12 5 mg Intravenous Given 6/5/21 1515)   sodium chloride 0 9 % infusion (100 mL/hr Intravenous New Bag 6/5/21 1510)   insulin lispro (HumaLOG) 100 units/mL subcutaneous injection 1-5 Units (has no administration in time range)   metoclopramide (REGLAN) injection 10 mg (10 mg Intravenous Given 6/5/21 0729)   diphenhydrAMINE (BENADRYL) injection 25 mg (25 mg Intravenous Given 6/5/21 0728)   lactated ringers bolus 1,000 mL (0 mL Intravenous Stopped 6/5/21 1012)   ondansetron (ZOFRAN) injection 4 mg (4 mg Intravenous Given 6/5/21 0744)   fentanyl citrate (PF) 100 MCG/2ML 50 mcg (50 mcg Intravenous Given 6/5/21 6810)   LORazepam (ATIVAN) injection 1 mg (1 mg Intravenous Given 6/5/21 0851)   pantoprazole (PROTONIX) injection 40 mg (40 mg Intravenous Given 6/5/21 0907)   ondansetron (ZOFRAN) injection 4 mg (4 mg Intravenous Given 6/5/21 0909)   haloperidol lactate (HALDOL) injection 5 mg (5 mg Intramuscular Given 6/5/21 1023)       Diagnostic Studies  Results Reviewed     Procedure Component Value Units Date/Time    Urine Microscopic [[id number]]  (Normal) Collected: 06/05/21 1013    Lab Status: Final result Specimen: Urine, Clean Catch Updated: 06/05/21 1048     RBC, UA None Seen /hpf      WBC, UA None Seen /hpf      Epithelial Cells Occasional /hpf      Bacteria, UA None Seen /hpf     POCT pregnancy, urine [[id number]]  (Normal) Resulted: 06/05/21 1015    Lab Status: Final result Updated: 06/05/21 1016     EXT PREG TEST UR (Ref: Negative) negative     Control valid    Urine Macroscopic, POC [[id number]]  (Abnormal) Collected: 06/05/21 1013    Lab Status: Final result Specimen: Urine Updated: 06/05/21 1014     Color, UA Yellow     Clarity, UA Clear     pH, UA 7 0     Leukocytes, UA Negative     Nitrite, UA Negative     Protein,  (2+) mg/dl      Glucose, UA Negative mg/dl      Ketones, UA Negative mg/dl      Urobilinogen, UA 0 2 E U /dl      Bilirubin, UA Negative     Blood, UA Trace     Specific Ward, UA 1 025    Narrative:      CLINITEK RESULT    Hepatic function panel [[id number]]  (Abnormal) Collected: 06/05/21 0733    Lab Status: Final result Specimen: Blood from Arm, Right Updated: 06/05/21 0814     Total Bilirubin 0 14 mg/dL      Bilirubin, Direct 0 05 mg/dL      Alkaline Phosphatase 171 U/L      AST 18 U/L      ALT 21 U/L      Total Protein 8 3 g/dL      Albumin 3 9 g/dL     Troponin I [[id number]]  (Normal) Collected: 06/05/21 0733    Lab Status: Final result Specimen: Blood from Arm, Right Updated: 06/05/21 0801     Troponin I <0 02 ng/mL     Basic metabolic panel [[id number]]  (Abnormal) Collected: 06/05/21 0733    Lab Status: Final result Specimen: Blood from Arm, Right Updated: 06/05/21 0759     Sodium 148 mmol/L      Potassium 3 8 mmol/L      Chloride 110 mmol/L      CO2 24 mmol/L      ANION GAP 14 mmol/L      BUN 32 mg/dL      Creatinine 1 91 mg/dL      Glucose 122 mg/dL      Calcium 9 2 mg/dL      eGFR 30 ml/min/1 73sq m     Narrative:      [place] guidelines for Chronic Kidney Disease (CKD):     Stage 1 with normal or high GFR (GFR > 90 mL/min/1 73 square meters)    Stage 2 Mild CKD (GFR = 60-89 mL/min/1 73 square meters)    Stage 3A Moderate CKD (GFR = 45-59 mL/min/1 73 square meters)    Stage 3B Moderate CKD (GFR = 30-44 mL/min/1 73 square meters)    Stage 4 Severe CKD (GFR = 15-29 mL/min/1 73 square meters)    Stage 5 End Stage CKD (GFR <15 mL/min/1 73 square meters)  Note: GFR calculation is accurate only with a steady state creatinine    Lipase [[id number]]  (Abnormal) Collected: 06/05/21 0733    Lab Status: Final result Specimen: Blood from Arm, Right Updated: 06/05/21 0759     Lipase 435 u/L     CBC and differential [[id number]]  (Abnormal) Collected: 06/05/21 0733    Lab Status: Final result Specimen: Blood from Arm, Right Updated: 06/05/21 0745     WBC 12 54 Thousand/uL      RBC 4 61 Million/uL      Hemoglobin 14 2 g/dL      Hematocrit 43 9 %      MCV 95 fL      MCH 30 8 pg      MCHC 32 3 g/dL      RDW 13 1 %      MPV 10 5 fL      Platelets 101 Thousands/uL      nRBC 0 /100 WBCs      Neutrophils Relative 83 %      Immat GRANS % 1 %      Lymphocytes Relative 10 %      Monocytes Relative 6 %      Eosinophils Relative 0 %      Basophils Relative 0 %      Neutrophils Absolute 10 49 Thousands/µL      Immature Grans Absolute 0 06 Thousand/uL      Lymphocytes Absolute 1 24 Thousands/µL      Monocytes Absolute 0 70 Thousand/µL      Eosinophils Absolute 0 02 Thousand/µL      Basophils Absolute 0 03 Thousands/µL                  CT abdomen pelvis wo contrast   ED Interpretation by Vera Thomason DO (06/05 8960) FINDINGS:  ABDOMEN  LOWER CHEST:  No clinically significant abnormality identified in the visualized lower chest         LIVER/BILIARY TREE:  Unremarkable         GALLBLADDER:  No calcified gallstones  No pericholecystic inflammatory change            SPLEEN:  Unremarkable         PANCREAS:  Unremarkable         ADRENAL GLANDS:    Right adrenal gland is normal      Stable 1 5 cm low-density lesion in the body of the left adrenal gland with Hounsfield units measuring -5 (series 2, image 19)  Findings most compatible with a benign adrenal adenoma  No further imaging is necessary         KIDNEYS/URETERS:  Unremarkable  No hydronephrosis           STOMACH AND BOWEL:    Evaluation of the GI tract limited due to lack of oral contrast material      Stomach incompletely distended and filled with ingested food products and air  Hiatal hernia  Gastric stimulator      No evidence of small bowel obstruction      The colon is relatively decompressed and inadequately evaluated         APPENDIX   : Multiple appendicoliths  No findings to suggest appendicitis           ABDOMINOPELVIC CAVITY:  No ascites  No pneumoperitoneum  No lymphadenopathy         VESSELS:  Atherosclerotic changes are present  No evidence of aneurysm        PELVIS  REPRODUCTIVE ORGANS:  Unremarkable for patient's age      URINARY BLADDER:  Unremarkable  ABDOMINAL WALL/INGUINAL REGIONS:  There is a small fat-containing umbilical hernia         OSSEOUS STRUCTURES:  No acute fracture or destructive osseous lesion  Spinal degenerative changes are noted               IMPRESSION:  No acute CT abnormality to account for the patient's abdominal symptoms  FINDINGS:  ABDOMEN  LOWER CHEST:  No clinically significant abnormality identified in the visualized lower chest            LIVER/BILIARY TREE:  Unremarkable         GALLBLADDER:  No calcified gallstones   No pericholecystic inflammatory change            SPLEEN:  Unremarkable         PANCREAS: Unremarkable         ADRENAL GLANDS:    Right adrenal gland is    normal      Stable 1 5 cm low-density lesion in the body of the left adrenal gland with Hounsfield units measuring -5 (series 2, image 19)  Findings most compatible with a benign adrenal adenoma  No further imaging is necessary         KIDNEYS/URETERS:  Unremarkable  No hydronephrosis           STOMACH AND BOWEL:    Evaluation of the GI tract limited due to lack of oral contrast material      Stomach incompletely distended and filled with ingested food products and air  Hiatal hernia  Gastric stimulator      No evidence of small bowel obstruction      The colon is relatively decompressed and inadequately evaluated      APPENDIX: Multiple appendicoliths  No findings to suggest appendicitis      ABDOMINOPELVIC CAVITY:  No ascites  No pneumoperitoneum  No lymphadenopathy      VESSELS:  Atherosclerotic changes are present  No evidence of aneurysm      PELVIS  REPRODUCTIVE ORGANS:  Unremarkable for patient's age      URINARY BLADDER:  Unremarkable      ABDOMINAL WALL/INGUINAL KWAME   ONS:  There is a small fat-containing umbilical hernia      OSSEOUS STRUCTURES:  No acute fracture or destructive osseous lesion  Spinal degenerative changes are noted         IMPRESSION:  No acute CT abnormality to account for the patient's abdominal symptoms  Final Result by Richardson Dubois DO (06/05 2093)   No acute CT abnormality to account for the patient's abdominal symptoms  Workstation performed: [id number]                    Procedures  Procedures         ED Course  ED Course as of Jun 05 1523   Sat Jun 05, 2021   0746 WBC(!): 12 54   0805 Lipase(!): 435   0805 Anion Gap(!): 14   0805 Resting more comfortably      0806 baseline   Creatinine(!): 1 91   0836 Pt initially was feeling better, now she is c/o severe pain/nausea again and is writhing around the bed   Reviewed results, will try further meds      1020 Pt still with pain/nausea, she is agreeable with trying Haldol  She states in the past it made her sleepy and that was her hesitation, however she is agreeable to trying, if this does not work, will need to admit for intractable pain/nausea      1053 Pt with no change after Haldol, will admit      1213 GI evaluated pt, recommended adding Scopolamine and state will need pacemaker/stimulator interrogated  MDM  Number of Diagnoses or Management Options  Epigastric pain:   Gastroparesis:   Intractable nausea and vomiting:   Diagnosis management comments: 47 yo F with abdominal pain/N/V likely worsening of gastroparesis, despite numerous ED medications, unable to control/improve sx  GI evaluated and gave recommendations and pt admitted to [facility]       Amount and/or Complexity of Data Reviewed  Clinical lab tests: ordered and reviewed  Tests in the radiology section of CPT®: ordered and reviewed  Tests in the medicine section of CPT®: ordered and reviewed  Review and summarize past medical records: yes  Discuss the patient with other providers: yes (GI)  Independent visualization of images, tracings, or specimens: yes        Disposition  Final diagnoses:   Intractable nausea and vomiting   Epigastric pain   Gastroparesis     Time reflects when diagnosis was documented in both MDM as applicable and the Disposition within this note     Time User Action Codes Description Comment    6/5/2021 11:00 AM Peggyann Flake A Add [R11 2] Intractable nausea and vomiting     6/5/2021 11:00 AM Peggyann Flake A Add [R10 13] Epigastric pain     6/5/2021 11:00 AM Rafael Pink Add [K31 84] Gastroparesis       ED Disposition     ED Disposition Condition Date/Time Comment    Admit Stable Sat Jun 5, 2021 11:00 AM Case was discussed with HAJA and the patient's admission status was agreed to be Admission Status: observation status to the service of   [facility]           Follow-up Information    None         Current Discharge Medication List CONTINUE these medications which have NOT CHANGED    Details   albuterol (PROVENTIL HFA,VENTOLIN HFA) 90 mcg/act inhaler INHALE TWO PUFFS BY MOUTH EVERY 6 HOURS AS NEEDED FOR WHEEZING OR SHORTNESS OF BREATH  Qty: 18 g, Refills: 2    Associated Diagnoses: Mild intermittent asthma without complication      Alcohol Swabs (ALCOHOL PADS) 70 % PADS by Does not apply route 4 (four) times a day  Qty: 400 each, Refills: 3    Associated Diagnoses: Type 2 diabetes mellitus with stage 4 chronic kidney disease, unspecified whether long term insulin use ([facility])      amLODIPine (NORVASC) 5 mg tablet TAKE ONE TABLET BY MOUTH ONCE DAILY  Qty: 90 tablet, Refills: 2    Associated Diagnoses: CKD (chronic kidney disease) stage 3, GFR 30-59 ml/min ([facility])      amphetamine-dextroamphetamine (ADDERALL XR) 20 MG 24 hr capsule Take 1 capsule (20 mg total) by mouth 2 (two) times a dayMax Daily Amount: 40 mg  Qty: 60 capsule, Refills: 0    Comments: Continuation of therapy  Do not refill until 5/23/21    Associated Diagnoses: Bipolar 1 disorder ([facility])      atorvastatin (LIPITOR) 20 mg tablet Take 1 tablet (20 mg total) by mouth daily  Qty: 90 tablet, Refills: 1    Associated Diagnoses: Mixed hyperlipidemia      Blood Glucose Monitoring Suppl (FREESTYLE LITE) DEIRDRE by Does not apply route daily  Qty: 1 each, Refills: 0    Associated Diagnoses: Type 2 diabetes mellitus with stage 4 chronic kidney disease, unspecified whether long term insulin use ([facility])      buPROPion (WELLBUTRIN XL) 300 mg 24 hr tablet Take 300 mg by mouth every morning       conjugated estrogens (PREMARIN) vaginal cream Insert 1 g into the vagina 2 (two) times a week  Qty: 30 g, Refills: 12    Associated Diagnoses: Atrophic vaginitis      cycloSPORINE (RESTASIS) 0 05 % ophthalmic emulsion Administer 1 drop to both eyes 2 (two) times a day      dexlansoprazole (Dexilant) 60 MG capsule Take 1 capsule (60 mg total) by mouth daily  Qty: 30 capsule, Refills: 3    Associated Diagnoses: Gastroesophageal reflux disease, unspecified whether esophagitis present      dicyclomine (BENTYL) 10 mg capsule       dicyclomine (BENTYL) 20 mg tablet Take 1 tablet (20 mg total) by mouth every 6 (six) hours  Qty: 30 tablet, Refills: 0    Associated Diagnoses: Abdominal pain      diphenoxylate-atropine (LOMOTIL) 2 5-0 025 mg per tablet Take 1 tablet by mouth 4 (four) times a day as needed for diarrhea  Qty: 120 tablet, Refills: 4    Associated Diagnoses: Diarrhea of presumed infectious origin       MG capsule TAKE ONE CAPSULE BY MOUTH TWICE A DAY  Qty: 180 capsule, Refills: 1    Associated Diagnoses: Constipation, unspecified constipation type      Easy Comfort Lancets MISC USE TO TEST THE BLOOD SUGAR THREE TIMES A DAY  Qty: 300 each, Refills: 10    Associated Diagnoses: Type 2 diabetes mellitus with stage 4 chronic kidney disease, unspecified whether long term insulin use ([facility])      FREESTYLE LITE test strip USE TO TEST THE BLOOD SUGAR THREE TIMES A DAY  Qty: 100 each, Refills: 5    Associated Diagnoses: Type 2 diabetes mellitus without complication, with long-term current use of insulin ([facility])      hydrocortisone 1 % cream APPLY TOPICALLY FOUR TIMES A DAY AS NEEDED FOR IRRITATION  Qty: 56 g, Refills: 0    Associated Diagnoses: Rash      insulin aspart (NovoLOG FlexPen) 100 UNIT/ML injection pen Inject 6 Units under the skin 3 (three) times a day with meals  Qty: 15 mL, Refills: 2    Associated Diagnoses: Type 2 diabetes mellitus without complication, with long-term current use of insulin ([facility])      insulin glargine (Toujeo Max SoloStar) 300 units/mL CONCENTRATED U-300 injection pen (2-unit dial) Inject 22 Units under the skin daily  Qty: 3 pen, Refills: 1    Associated Diagnoses: Type 2 diabetes mellitus without complication, with long-term current use of insulin ([facility])      Insulin Pen Needle (PEN NEEDLES) 31G X 8 MM MISC Inject 1 Stick under the skin 4 (four) times a day (with meals and at bedtime)  Qty: 100 each, Refills: 6    Associated Diagnoses: IDDM (insulin dependent diabetes mellitus)      lidocaine (XYLOCAINE) 5 % ointment APPLY TOPICALLY 2GM TWICE A DAY TO AFFECTED AREAS AS NEEDED FOR MILD PAIN  Qty: 250 g, Refills: 8    Associated Diagnoses: Chronic pain syndrome      linaCLOtide (Linzess) 72 MCG CAPS Take 72 mcg by mouth daily  Qty: 90 capsule, Refills: 3    Associated Diagnoses: Constipation, unspecified constipation type      loperamide (IMODIUM) 2 mg capsule Take 1 capsule (2 mg total) by mouth 4 (four) times a day as needed for diarrhea  Qty: 12 capsule, Refills: 0    Associated Diagnoses: Diarrhea      montelukast (SINGULAIR) 10 mg tablet Take 1 tablet (10 mg total) by mouth daily at bedtime  Qty: 90 tablet, Refills: 1    Associated Diagnoses: Seasonal allergic rhinitis due to pollen      Movantik 25 MG tablet       Nutritional Supplements (Ensure Original) LIQD Take 1 Bottle by mouth 2 (two) times a day  Qty: 60 Bottle, Refills: 11    Associated Diagnoses: Gastroparesis      ondansetron (ZOFRAN) 4 mg tablet Take 1 tablet (4 mg total) by mouth every 8 (eight) hours as needed for nausea or vomiting  Qty: 30 tablet, Refills: 2    Associated Diagnoses: Gastroparesis      oxyCODONE-acetaminophen (PERCOCET)  mg per tablet Take 1 tablet by mouth every 6 (six) hours as needed for moderate painMax Daily Amount: 4 tablets  Qty: 120 tablet, Refills: 0    Comments: Continuation of therapy  Associated Diagnoses: Granulomatosis with polyangiitis with renal involvement ([facility] [address] );  Chronic pain syndrome      pantoprazole (PROTONIX) 40 mg tablet Take 1 tablet (40 mg total) by mouth 2 (two) times a day before meals  Qty: 60 tablet, Refills: 5    Associated Diagnoses: Gastroesophageal reflux disease without esophagitis      Probiotic Product (PROBIOTIC-10 PO) Take 1 tablet by mouth daily      promethazine (PHENERGAN) 25 mg tablet Take 1 tablet (25 mg total) by mouth every 6 (six) hours as needed for nausea or vomiting  Qty: 60 tablet, Refills: 3    Associated Diagnoses: Nausea and vomiting      !! QUEtiapine (SEROquel) 200 mg tablet Take 150 mg by mouth daily at bedtime       !! QUEtiapine (SEROquel) 300 mg tablet       scopolamine (TRANSDERM-SCOP) 1 5 mg/3 days TD 72 hr patch Place 1 patch on the skin every third day  Qty: 10 patch, Refills: 0    Associated Diagnoses: Pyelonephritis      sucralfate (CARAFATE) 1 g/10 mL suspension Take 10 mL (1 g total) by mouth 4 (four) times a day  Qty: 420 mL, Refills: 0    Associated Diagnoses: Abdominal pain       !! - Potential duplicate medications found  Please discuss with provider  No discharge procedures on file      PDMP Review       Value Time User    PDMP Reviewed  Yes 5/19/2021  2:06 PM Talat Mcqueen PA-C          ED Provider  Electronically Signed by           Steve Monet DO  06/05/21 1520

## 2021-06-05 NOTE — ASSESSMENT & PLAN NOTE
Lab Results   Component Value Date    HGBA1C 6 0 02/15/2021       No results for input(s): POCGLU in the last 72 hours  Blood Sugar Average: Last 72 hrs:       Likely led to her gastroparesis  She will be NPO so I will just have her on a sliding scale insulin every 6 hours

## 2021-06-05 NOTE — ED NOTES
Patient reports that she has nausea and pain again  Asking for meds again   Dr Jordyn rFeeman made aware     Fara Bhardwaj, RN  06/05/21 7891

## 2021-06-05 NOTE — ASSESSMENT & PLAN NOTE
As above  I will discuss with GI    She does not take Reglan and 1 to see if she has had problem with that in the past or if it would interfere with her psychiatric meds

## 2021-06-05 NOTE — ED NOTES
Per provider Dr Dayana Dyer, patient is willing to try Haldol , med listed as allergy but pt reports that it is just because she does not like to take that medication   Dr Dayana Dyer will order med for patient     Estefania Umana, GIAN  06/05/21 1010 pt d/c'd from ED for vomiting, given zofran. When pt came out to waiting room he began vomiting again, mom requesting to be seen by MD again. no

## 2021-06-05 NOTE — ASSESSMENT & PLAN NOTE
Patient has chronic abdominal pain, gastroparesis, nausea, and vomiting    Unfortunately this is likely ongoing gastroparesis  She was asking me for pain medication, but I did  her that that will actually make this worse    We will give her bowel rest, NPO, supportive care with antiemetics  GI consultation to see if there is something else going on

## 2021-06-05 NOTE — H&P
[facility]  H&P- Munira Steele 1970, 48 y o  female MRN: [id number]  Unit/Bed#: E5 -01 Encounter: [id number]  Primary Care Provider: Bren Singh PA-C   Date and time admitted to hospital: 6/5/2021  7:10 AM    * Nausea and vomiting  Assessment & Plan  Patient has chronic abdominal pain, gastroparesis, nausea, and vomiting    Unfortunately this is likely ongoing gastroparesis  She was asking me for pain medication, but I did  her that that will actually make this worse    We will give her bowel rest, NPO, supportive care with antiemetics  GI consultation to see if there is something else going on  Gastroparesis  Assessment & Plan  As above  I will discuss with GI  She does not take Reglan and 1 to see if she has had problem with that in the past or if it would interfere with her psychiatric meds    Diabetes [facility])  Assessment & Plan  Lab Results   Component Value Date    HGBA1C 6 0 02/15/2021       No results for input(s): POCGLU in the last 72 hours  Blood Sugar Average: Last 72 hrs:       Likely led to her gastroparesis  She will be NPO so I will just have her on a sliding scale insulin every 6 hours  Chief Complaint:   Nausea vomiting      History of Present Illness:    Munira Steele is a 48 y o  female who presents with intractable nausea vomiting  She has underlying gastroparesis  She has irritable bowel syndrome  She even has a gastric pacemaker  But she continued to needs to present with intractable nausea vomiting and abdominal pain  The vomitus is normal food stuffs in liquids  She is complaining most of abdominal pain  It is all over her abdomen  She is asking for pain medications  No bloody or black stools  No weight loss  No diarrhea recently  No recent antibiotics  No recent changes to medications         Review of Systems:    Review of Systems   Constitutional: Negative for chills and fever     HENT: Negative for ear pain and sore throat  Eyes: Negative for pain and visual disturbance  Respiratory: Negative for cough and shortness of breath  Cardiovascular: Negative for chest pain and palpitations  Gastrointestinal: Positive for abdominal pain, nausea and vomiting  Genitourinary: Negative for dysuria and hematuria  Musculoskeletal: Negative for arthralgias and back pain  Skin: Negative for color change and rash  Neurological: Negative for seizures and syncope  All other systems reviewed and are negative  Past Medical and Surgical History:     Past Medical History:   Diagnosis Date    ADHD     Anemia of chronic disease     Anxiety     Asthma     Asthma     Borderline personality disorder ([facility] )     Cataplexy     Chronic abdominal pain     CKD (chronic kidney disease) stage 3, GFR 30-59 ml/min (HCC)     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[facility] )     Cushing syndrome ([facility] )     Diabetes mellitus ([place] [address] )     DVT (deep venous thrombosis) (HCC)     GERD (gastroesophageal reflux disease)     History of acute pancreatitis     felt secondary to Bactrim    History of transfusion     HTN (hypertension)     Hypertension     Liver disease     fatty liver    Microscopic polyangiitis (HCC)     Morbid obesity (HCC)     MPA (microscopic polyangiitis) (HCC)     Ovarian cyst     PTSD (post-traumatic stress disorder)     Renal disorder     Self-inflicted injury     self inflicted skin wounds    Wegener's granulomatosis with renal involvement ([place] [address] ) 2015       Past Surgical History:   Procedure Laterality Date    COLONOSCOPY      ESOPHAGOGASTRODUODENOSCOPY  09/11/2015    mild antral gastritis    GASTRIC STIMULATOR IMPLANT SURGERY  06/25/2020    MI COLONOSCOPY FLX DX W/COLLJ SPEC WHEN PFRMD N/A 12/14/2018    Procedure: COLONOSCOPY with polypectomy;  Surgeon: Gordo Mai MD;  Location: [facility];   Service: Gastroenterology    MI ESOPHAGOGASTRODUODENOSCOPY TRANSORAL DIAGNOSTIC N/A 12/14/2018    Procedure: ESOPHAGOGASTRODUODENOSCOPY (EGD) with biopsy;  Surgeon: Raquel Florian MD;  Location: [facility]; Service: Gastroenterology    RELEASE SCAR CONTRACTURE / GRAFT REPAIRS OF HAND Bilateral     UPPER GASTROINTESTINAL ENDOSCOPY  12/26/2019         Home Medications:    Prior to Admission medications    Medication Sig Start Date End Date Taking?  Authorizing Provider   albuterol (PROVENTIL HFA,VENTOLIN HFA) 90 mcg/act inhaler INHALE TWO PUFFS BY MOUTH EVERY 6 HOURS AS NEEDED FOR WHEEZING OR SHORTNESS OF BREATH 5/10/21   Katarzyna Thrasher PA-C   Alcohol Swabs (ALCOHOL PADS) 70 % PADS by Does not apply route 4 (four) times a day 6/7/19   Jesica Reyes PA-C   amLODIPine (NORVASC) 5 mg tablet TAKE ONE TABLET BY MOUTH ONCE DAILY 12/29/20   Juwan Lea DO   amphetamine-dextroamphetamine (ADDERALL XR) 20 MG 24 hr capsule Take 1 capsule (20 mg total) by mouth 2 (two) times a dayMax Daily Amount: 40 mg 5/19/21   Katarzyna Thrasher PA-C   atorvastatin (LIPITOR) 20 mg tablet Take 1 tablet (20 mg total) by mouth daily 3/3/21   Katarzyna Thrasher PA-C   Blood Glucose Monitoring Suppl (FREESTYLE LITE) DEIRDRE by Does not apply route daily 5/21/19   Jesica Reyes PA-C   buPROPion (WELLBUTRIN XL) 300 mg 24 hr tablet Take 300 mg by mouth every morning  12/4/20   Historical Provider, MD   conjugated estrogens (PREMARIN) vaginal cream Insert 1 g into the vagina 2 (two) times a week 5/20/21   PRASANTH Barrera   cycloSPORINE (RESTASIS) 0 05 % ophthalmic emulsion Administer 1 drop to both eyes 2 (two) times a day    Historical Provider, MD   dexlansoprazole (Dexilant) 60 MG capsule Take 1 capsule (60 mg total) by mouth daily 2/18/21   Radha Trevizo PA-C   dicyclomine (BENTYL) 10 mg capsule  11/27/20   Historical Provider, MD   dicyclomine (BENTYL) 20 mg tablet Take 1 tablet (20 mg total) by mouth every 6 (six) hours 5/9/21   Sheila Cloud PA-C   diphenoxylate-atropine (LOMOTIL) 2 5-0 025 mg per tablet Take 1 tablet by mouth 4 (four) times a day as needed for diarrhea 5/10/21   Nelwyn Sandhoff, PA-C    MG capsule TAKE ONE CAPSULE BY MOUTH TWICE A DAY 5/24/21   Katarzyna Thrasher PA-C   Easy Comfort Lancets MISC USE TO TEST THE BLOOD SUGAR THREE TIMES A DAY 3/1/21   Katarzyna Thrasher PA-C   FREESTYLE LITE test strip USE TO TEST THE BLOOD SUGAR THREE TIMES A DAY 4/1/21   Katarzyna Thrasher PA-C   hydrocortisone 1 % cream APPLY TOPICALLY FOUR TIMES A DAY AS NEEDED FOR IRRITATION 5/28/21   Katarzyna Thrasher PA-C   insulin aspart (NovoLOG FlexPen) 100 UNIT/ML injection pen Inject 6 Units under the skin 3 (three) times a day with meals 9/24/20   Katarzyna Thrasher PA-C   insulin glargine (Toujeo Max SoloStar) 300 units/mL CONCENTRATED U-300 injection pen (2-unit dial) Inject 22 Units under the skin daily 6/2/21   Katarzyna Thrasher PA-C   Insulin Pen Needle (PEN NEEDLES) 31G X 8 MM MISC Inject 1 Stick under the skin 4 (four) times a day (with meals and at bedtime) 3/28/18   Moon De La Torre PA-C   lidocaine (XYLOCAINE) 5 % ointment APPLY TOPICALLY 2GM TWICE A DAY TO AFFECTED AREAS AS NEEDED FOR MILD PAIN 12/10/20   Katarzyna Thrasher PA-C   linaCLOtide (Linzess) 72 MCG CAPS Take 72 mcg by mouth daily 2/18/21   Lajean Favre, PA-C   loperamide (IMODIUM) 2 mg capsule Take 1 capsule (2 mg total) by mouth 4 (four) times a day as needed for diarrhea 5/5/21   Milyboni Guevara PA-C   montelukast (SINGULAIR) 10 mg tablet Take 1 tablet (10 mg total) by mouth daily at bedtime 5/14/21   Katarzyna Thrasher PA-C   Movantik 25 MG tablet  5/22/21   Historical Provider, MD   Nutritional Supplements (Ensure Original) LIQD Take 1 Bottle by mouth 2 (two) times a day 10/13/20   Katarzyna A Durako, PA-C   ondansetron (ZOFRAN) 4 mg tablet Take 1 tablet (4 mg total) by mouth every 8 (eight) hours as needed for nausea or vomiting 9/24/20   MARIANELA Hazel-C   oxyCODONE-acetaminophen (PERCOCET)  mg per tablet Take 1 tablet by mouth every 6 (six) hours as needed for moderate painMax Daily Amount: 4 tablets 5/10/21   Katarzyna Thrasher PA-C   pantoprazole (PROTONIX) 40 mg tablet Take 1 tablet (40 mg total) by mouth 2 (two) times a day before meals 8/26/20   Jane Soria PA-C   Probiotic Product (PROBIOTIC-10 PO) Take 1 tablet by mouth daily    Historical Provider, MD   promethazine (PHENERGAN) 25 mg tablet Take 1 tablet (25 mg total) by mouth every 6 (six) hours as needed for nausea or vomiting 5/10/21   Clemencia Alfaro PA-C   QUEtiapine (SEROquel) 200 mg tablet Take 150 mg by mouth daily at bedtime     Historical Provider, MD   QUEtiapine (SEROquel) 300 mg tablet  5/19/21   Historical Provider, MD   scopolamine (TRANSDERM-SCOP) 1 5 mg/3 days TD 72 hr patch Place 1 patch on the skin every third day 4/27/21   Frann Severe, PA-C   sucralfate (CARAFATE) 1 g/10 mL suspension Take 10 mL (1 g total) by mouth 4 (four) times a day 5/9/21   Roverto See PA-C     I have reviewed home medications with patient personally  Allergies:    Allergies   Allergen Reactions    Prozac [Fluoxetine Hcl]      SI    Bactrim [Sulfamethoxazole-Trimethoprim]      Pt "They think that is what cause the pancreatitis"     Flagyl [Metronidazole] Diarrhea and Abdominal Pain    Lamictal [Lamotrigine] GI Intolerance    Lithium Other (See Comments)    Haldol [Haloperidol] Other (See Comments)     "I don't like it"    Ibuprofen     Lexapro [Escitalopram Oxalate] Rash    Navane [Thiothixene]      SI    Other      "novaine?" antipsychotic         Social History:    Substance Use History:   Social History     Substance and Sexual Activity   Alcohol Use Never    Frequency: Never    Binge frequency: Never     Social History     Tobacco Use   Smoking Status Former Smoker    Quit date: 2011    Years since quitting: 10 4   Smokeless Tobacco Never Used   Tobacco Comment    marijuana     Social History     Substance and Sexual Activity   Drug Use Yes    Types: Marijuana    Comment: marijuana daily         Family History:    non-contributory      Physical Exam:     Vitals:   Blood Pressure: 155/90 (06/05/21 1259)  Pulse: 64 (06/05/21 1259)  Temperature: 97 8 °F (36 6 °C) (06/05/21 1259)  Temp Source: Oral (06/05/21 1259)  Respirations: 18 (06/05/21 1259)  SpO2: 98 % (06/05/21 1259)    Physical Exam  Vitals signs and nursing note reviewed  HENT:      Head: Normocephalic and atraumatic  Eyes:      Pupils: Pupils are equal, round, and reactive to light  Cardiovascular:      Rate and Rhythm: Normal rate and regular rhythm  Heart sounds: No murmur  No friction rub  No gallop  Pulmonary:      Effort: Pulmonary effort is normal       Breath sounds: Normal breath sounds  No wheezing or rales  Abdominal:      General: Bowel sounds are normal       Palpations: Abdomen is soft  Tenderness: There is abdominal tenderness  Comments: Tender all quadrants  No rebound or guarding  Does not examine as an acute abdomen   Musculoskeletal:      Right lower leg: No edema  Left lower leg: No edema       Additional Data:     Lab Results: I have personally reviewed pertinent reports  Results from last 7 days   Lab Units 06/05/21  0733   WBC Thousand/uL 12 54*   HEMOGLOBIN g/dL 14 2   HEMATOCRIT % 43 9   PLATELETS Thousands/uL 300   NEUTROS PCT % 83*   LYMPHS PCT % 10*   MONOS PCT % 6   EOS PCT % 0     Results from last 7 days   Lab Units 06/05/21  0733   POTASSIUM mmol/L 3 8   CHLORIDE mmol/L 110*   CO2 mmol/L 24   BUN mg/dL 32*   CREATININE mg/dL 1 91*   CALCIUM mg/dL 9 2   ALK PHOS U/L 171*   ALT U/L 21   AST U/L 18                     Imaging: I have personally reviewed pertinent reports  CT abdomen pelvis wo contrast   ED Interpretation by Brittany Merlos DO (06/05 3224)   FINDINGS:  ABDOMEN  LOWER CHEST:  No clinically significant abnormality identified in the visualized lower chest         LIVER/BILIARY TREE:  Unremarkable         GALLBLADDER:  No calcified gallstones   No pericholecystic inflammatory change            SPLEEN:  Unremarkable         PANCREAS:  Unremarkable         ADRENAL GLANDS:    Right adrenal gland is normal      Stable 1 5 cm low-density lesion in the body of the left adrenal gland with Hounsfield units measuring -5 (series 2, image 19)  Findings most compatible with a benign adrenal adenoma  No further imaging is necessary         KIDNEYS/URETERS:  Unremarkable  No hydronephrosis           STOMACH AND BOWEL:    Evaluation of the GI tract limited due to lack of oral contrast material      Stomach incompletely distended and filled with ingested food products and air  Hiatal hernia  Gastric stimulator      No evidence of small bowel obstruction      The colon is relatively decompressed and inadequately evaluated         APPENDIX   : Multiple appendicoliths  No findings to suggest appendicitis           ABDOMINOPELVIC CAVITY:  No ascites  No pneumoperitoneum  No lymphadenopathy         VESSELS:  Atherosclerotic changes are present  No evidence of aneurysm        PELVIS  REPRODUCTIVE ORGANS:  Unremarkable for patient's age      URINARY BLADDER:  Unremarkable  ABDOMINAL WALL/INGUINAL REGIONS:  There is a small fat-containing umbilical hernia         OSSEOUS STRUCTURES:  No acute fracture or destructive osseous lesion  Spinal degenerative changes are noted               IMPRESSION:  No acute CT abnormality to account for the patient's abdominal symptoms  FINDINGS:  ABDOMEN  LOWER CHEST:  No clinically significant abnormality identified in the visualized lower chest            LIVER/BILIARY TREE:  Unremarkable         GALLBLADDER:  No calcified gallstones  No pericholecystic inflammatory change            SPLEEN:  Unremarkable         PANCREAS:  Unremarkable         ADRENAL GLANDS:    Right adrenal gland is    normal      Stable 1 5 cm low-density lesion in the body of the left adrenal gland with Hounsfield units measuring -5 (series 2, image 19)    Findings most compatible with a benign adrenal adenoma  No further imaging is necessary         KIDNEYS/URETERS:  Unremarkable  No hydronephrosis           STOMACH AND BOWEL:    Evaluation of the GI tract limited due to lack of oral contrast material      Stomach incompletely distended and filled with ingested food products and air  Hiatal hernia  Gastric stimulator      No evidence of small bowel obstruction      The colon is relatively decompressed and inadequately evaluated      APPENDIX: Multiple appendicoliths  No findings to suggest appendicitis      ABDOMINOPELVIC CAVITY:  No ascites  No pneumoperitoneum  No lymphadenopathy      VESSELS:  Atherosclerotic changes are present  No evidence of aneurysm      PELVIS  REPRODUCTIVE ORGANS:  Unremarkable for patient's age      URINARY BLADDER:  Unremarkable      ABDOMINAL WALL/INGUINAL KWAME   ONS:  There is a small fat-containing umbilical hernia      OSSEOUS STRUCTURES:  No acute fracture or destructive osseous lesion  Spinal degenerative changes are noted         IMPRESSION:  No acute CT abnormality to account for the patient's abdominal symptoms  Final Result by Levi Villalpando DO (06/05 7228)   No acute CT abnormality to account for the patient's abdominal symptoms  Workstation performed: [id number]               ·       VTE Prophylaxis: Ambulating        Anticipated Length of Stay:  Patient will be admitted on an Observation basis with an anticipated length of stay of  less than 2 midnights  Justification for Hospital Stay:  Patient has intractable nausea vomiting likely due to gastroparesis  Will see if this can resolve overnight  If not we may make have to make her inpatient status  Total Time for Visit, including Counseling / Coordination of Care: 45 minutes  Greater than 50% of this total time spent on direct patient counseling and coordination of care        ** Please Note: This note has been constructed using a voice recognition system   **

## 2021-06-05 NOTE — ED NOTES
Pt ringing asking for medication   RN spoke with Dr Tawana Montoya , awaiting GI recommendation     Sultana Oliva, RN  06/05/21 4485

## 2021-06-06 VITALS
TEMPERATURE: 99.4 F | HEART RATE: 98 BPM | RESPIRATION RATE: 18 BRPM | OXYGEN SATURATION: 99 % | SYSTOLIC BLOOD PRESSURE: 173 MMHG | DIASTOLIC BLOOD PRESSURE: 102 MMHG

## 2021-06-06 LAB
ANION GAP SERPL CALCULATED.3IONS-SCNC: 13 MMOL/L (ref 4–13)
BASOPHILS # BLD AUTO: 0.01 THOUSANDS/ΜL (ref 0–0.1)
BASOPHILS NFR BLD AUTO: 0 % (ref 0–1)
BUN SERPL-MCNC: 26 MG/DL (ref 5–25)
CALCIUM SERPL-MCNC: 9.6 MG/DL (ref 8.3–10.1)
CHLORIDE SERPL-SCNC: 105 MMOL/L (ref 100–108)
CO2 SERPL-SCNC: 22 MMOL/L (ref 21–32)
CREAT SERPL-MCNC: 1.54 MG/DL (ref 0.6–1.3)
EOSINOPHIL # BLD AUTO: 0 THOUSAND/ΜL (ref 0–0.61)
EOSINOPHIL NFR BLD AUTO: 0 % (ref 0–6)
ERYTHROCYTE [DISTWIDTH] IN BLOOD BY AUTOMATED COUNT: 13.2 % (ref 11.6–15.1)
GFR SERPL CREATININE-BSD FRML MDRD: 39 ML/MIN/1.73SQ M
GLUCOSE P FAST SERPL-MCNC: 136 MG/DL (ref 65–99)
GLUCOSE SERPL-MCNC: 130 MG/DL (ref 65–140)
GLUCOSE SERPL-MCNC: 136 MG/DL (ref 65–140)
GLUCOSE SERPL-MCNC: 137 MG/DL (ref 65–140)
GLUCOSE SERPL-MCNC: 147 MG/DL (ref 65–140)
HCT VFR BLD AUTO: 43.2 % (ref 34.8–46.1)
HGB BLD-MCNC: 14.3 G/DL (ref 11.5–15.4)
IMM GRANULOCYTES # BLD AUTO: 0.03 THOUSAND/UL (ref 0–0.2)
IMM GRANULOCYTES NFR BLD AUTO: 0 % (ref 0–2)
LYMPHOCYTES # BLD AUTO: 0.67 THOUSANDS/ΜL (ref 0.6–4.47)
LYMPHOCYTES NFR BLD AUTO: 7 % (ref 14–44)
MAGNESIUM SERPL-MCNC: 2 MG/DL (ref 1.6–2.6)
MCH RBC QN AUTO: 30 PG (ref 26.8–34.3)
MCHC RBC AUTO-ENTMCNC: 33.1 G/DL (ref 31.4–37.4)
MCV RBC AUTO: 91 FL (ref 82–98)
MONOCYTES # BLD AUTO: 0.57 THOUSAND/ΜL (ref 0.17–1.22)
MONOCYTES NFR BLD AUTO: 6 % (ref 4–12)
NEUTROPHILS # BLD AUTO: 8.38 THOUSANDS/ΜL (ref 1.85–7.62)
NEUTS SEG NFR BLD AUTO: 87 % (ref 43–75)
NRBC BLD AUTO-RTO: 0 /100 WBCS
PLATELET # BLD AUTO: 281 THOUSANDS/UL (ref 149–390)
PMV BLD AUTO: 10.2 FL (ref 8.9–12.7)
POTASSIUM SERPL-SCNC: 3.9 MMOL/L (ref 3.5–5.3)
RBC # BLD AUTO: 4.76 MILLION/UL (ref 3.81–5.12)
SODIUM SERPL-SCNC: 140 MMOL/L (ref 136–145)
WBC # BLD AUTO: 9.66 THOUSAND/UL (ref 4.31–10.16)

## 2021-06-06 PROCEDURE — 82948 REAGENT STRIP/BLOOD GLUCOSE: CPT

## 2021-06-06 PROCEDURE — 99232 SBSQ HOSP IP/OBS MODERATE 35: CPT | Performed by: PHYSICIAN ASSISTANT

## 2021-06-06 PROCEDURE — 80048 BASIC METABOLIC PNL TOTAL CA: CPT | Performed by: HOSPITALIST

## 2021-06-06 PROCEDURE — 85025 COMPLETE CBC W/AUTO DIFF WBC: CPT | Performed by: HOSPITALIST

## 2021-06-06 PROCEDURE — 83735 ASSAY OF MAGNESIUM: CPT | Performed by: HOSPITALIST

## 2021-06-06 PROCEDURE — 99217 PR OBSERVATION CARE DISCHARGE MANAGEMENT: CPT | Performed by: HOSPITALIST

## 2021-06-06 RX ORDER — LABETALOL 20 MG/4 ML (5 MG/ML) INTRAVENOUS SYRINGE
10 ONCE
Status: COMPLETED | OUTPATIENT
Start: 2021-06-06 | End: 2021-06-06

## 2021-06-06 RX ADMIN — BUPROPION HYDROCHLORIDE 300 MG: 150 TABLET, FILM COATED, EXTENDED RELEASE ORAL at 09:58

## 2021-06-06 RX ADMIN — LUBIPROSTONE 8 MCG: 8 CAPSULE, GELATIN COATED ORAL at 09:59

## 2021-06-06 RX ADMIN — HYDRALAZINE HYDROCHLORIDE 10 MG: 20 INJECTION, SOLUTION INTRAMUSCULAR; INTRAVENOUS at 00:11

## 2021-06-06 RX ADMIN — LABETALOL 20 MG/4 ML (5 MG/ML) INTRAVENOUS SYRINGE 10 MG: at 02:55

## 2021-06-06 RX ADMIN — ONDANSETRON 4 MG: 2 INJECTION INTRAMUSCULAR; INTRAVENOUS at 05:21

## 2021-06-06 RX ADMIN — ONDANSETRON 4 MG: 2 INJECTION INTRAMUSCULAR; INTRAVENOUS at 00:24

## 2021-06-06 RX ADMIN — Medication 250 MG: at 09:58

## 2021-06-06 RX ADMIN — SODIUM CHLORIDE 100 ML/HR: 0.9 INJECTION, SOLUTION INTRAVENOUS at 01:10

## 2021-06-06 NOTE — PROGRESS NOTES
Patient Name: Basilia Vega  Patient MRN: [id number]  Date: 06/06/21  Service: [facility]    Subjective   Basilia Vega is a 48 y o  female who was admitted with abdominal pain  She feels much better today  No further vomiting  Remains NPO  Patient admits:  Abdominal pain, nausea  Denies:  Chest pain, dizziness, dyspnea  All others negative except as noted in HPI  Objective     Vitals  BP (!) 173/102 (BP Location: Right arm)   Pulse 98   Temp 99 4 °F (37 4 °C)   Resp 18   LMP  (LMP Unknown)   SpO2 99%   General: Alert, no apparent distress  Eyes:  No scleral icterus  ENT:  Mucous membranes moist  Card:  Regular rhythm  Lungs: Clear to ascultation b/l  No wheezes, rales, rhonchi  Abdomen:  Soft  Nondistended  Bowel sounds normal active  She is tender in the epigastric region without rebound or guarding  Skin:  No jaundice    Multiple tattoos and scars on upper extremities  Extremities:  No edema  Neuro: Alert and oriented x3    Laboratory Studies  Lab Results   Component Value Date    CREATININE 1 54 (H) 06/06/2021    BUN 26 (H) 06/06/2021    SODIUM 140 06/06/2021    K 3 9 06/06/2021     06/06/2021    CO2 22 06/06/2021    GLUCOSE 103 08/01/2016    CALCIUM 9 6 06/06/2021    ALKPHOS 171 (H) 06/05/2021    ALB 3 9 06/05/2021    TBILI 0 14 (L) 06/05/2021    AST 18 06/05/2021    ALT 21 06/05/2021     Lab Results   Component Value Date    WBC 9 66 06/06/2021    HGB 14 3 06/06/2021    HCT 43 2 06/06/2021     06/06/2021    MCV 91 06/06/2021     Lab Results   Component Value Date    PROTIME 13 7 04/26/2021    INR 1 07 04/26/2021       Inhouse Medications       Current Facility-Administered Medications:     albuterol (PROVENTIL HFA,VENTOLIN HFA) inhaler 2 puff, 2 puff, Inhalation, Q4H PRN    buPROPion (WELLBUTRIN XL) 24 hr tablet 300 mg, 300 mg, Oral, QAM    dicyclomine (BENTYL) tablet 20 mg, 20 mg, Oral, Q6H    hydrALAZINE (APRESOLINE) injection 10 mg, 10 mg, Intravenous, Q6H PRN, 10 mg at 06/06/21 0011    lubiprostone (AMITIZA) capsule 8 mcg, 8 mcg, Oral, BID With Meals    ondansetron (ZOFRAN) injection 4 mg, 4 mg, Intravenous, Q4H PRN, 4 mg at 06/06/21 0521    pantoprazole (PROTONIX) injection 40 mg, 40 mg, Intravenous, Q12H PAULINE, 40 mg at 06/05/21 2233    promethazine (PHENERGAN) injection 12 5 mg, 12 5 mg, Intravenous, Q6H PRN, 12 5 mg at 06/05/21 1515    QUEtiapine (SEROquel) tablet 150 mg, 150 mg, Oral, HS    saccharomyces boulardii (FLORASTOR) capsule 250 mg, 250 mg, Oral, BID    [START ON 6/8/2021] scopolamine (TRANSDERM-SCOP) 1 5 mg/3 days TD 72 hr patch 1 patch, 1 patch, Transdermal, Q72H    sodium chloride 0 9 % infusion, 100 mL/hr, Intravenous, Continuous, Stopped at 06/06/21 0826      Assessment/Plan:  1  Epigastric abdominal pain, nausea and vomiting likely secondary to gastroparesis  Symptoms improved today  Will trial clear liquids  She uses scopolamine at home at Dr Abad [place] suggestion  She believes this episode of severe pain was triggered by eating a large amount of chocolate      Principal Problem:    Nausea and vomiting  Active Problems:    Diabetes (HCC)    Gastroparesis    Jane Soria PA-C

## 2021-06-06 NOTE — DISCHARGE SUMMARY
[facility]  Discharge- Liz Lyons 1970, 48 y o  female MRN: [id number]  Unit/Bed#: E5 -01 Encounter: [id number]  Primary Care Provider: Timothy Craig PA-C   Date and time admitted to hospital: 6/5/2021  7:10 AM    * Nausea and vomiting  Assessment & Plan  We made her NPO and stopped all her narcotics  Her nausea vomiting quickly resolved with antiemetics    Encouraged her to not use narcotics as it will likely make her gastroparesis worse    Gastroparesis  Assessment & Plan  I encouraged her to avoid narcotics        Discharging Physician / Practitioner: Rodgers Cranker, DO  PCP: Timothy Craig PA-C  Admission Date:   Admission Orders (From admission, onward)     Ordered        06/05/21 1211  Place in Observation  Once                   Discharge Date: 06/06/21    Resolved Problems  Date Reviewed: 5/12/2021    None            Consultations During Hospital Stay:  · GI          Reason for Admission:  Intractable vomit      Hospital Course:     Liz Lyons is a 48 y o  female patient who originally presented to the hospital on 6/5/2021 due to intractable vomiting  Patient has known gastroparesis  She even has a gastric pacemaker  The still suffers from frequent bouts of gastroparesis with intractable vomiting  She presented with the same  Was normal vomitus  No blood or blackness in the vomitus  We made her NPO  Give her antiemetics  I told her we could not give her narcotics at was it would only make it worse  Within 24 hours she was feeling better  Okay to go home  I told her to avoid narcotics    Please see above list of diagnoses and related plan for additional information         Condition at Discharge: stable       Discharge Day Visit / Exam:     Subjective:  Feels better no nausea  Wants to go home  No abdominal pain      Vitals: Blood Pressure: (!) 173/102 (06/06/21 0730)  Pulse: 98 (06/06/21 0730)  Temperature: 99 4 °F (37 4 °C) (06/06/21 5908)  Temp Source: Oral (06/05/21 1459)  Respirations: 18 (06/06/21 0730)  SpO2: 99 % (06/06/21 0730)    Exam:     Physical Exam  Vitals signs and nursing note reviewed  HENT:      Head: Normocephalic and atraumatic  Eyes:      Pupils: Pupils are equal, round, and reactive to light  Cardiovascular:      Rate and Rhythm: Normal rate and regular rhythm  Heart sounds: No murmur  No friction rub  No gallop  Pulmonary:      Effort: Pulmonary effort is normal       Breath sounds: Normal breath sounds  No wheezing or rales  Abdominal:      General: Bowel sounds are normal       Palpations: Abdomen is soft  Tenderness: There is no abdominal tenderness  Musculoskeletal:      Right lower leg: No edema  Left lower leg: No edema            Discharge instructions/Information to patient and family:   See after visit summary for information provided to patient and family  Provisions for Follow-Up Care:  See after visit summary for information related to follow-up care and any pertinent home health orders  Disposition:     Home       Discharge Statement:  I spent 37 minutes discharging the patient  This time was spent on the day of discharge  I had direct contact with the patient on the day of discharge  Greater than 50% of the total time was spent examining patient, answering all patient questions, arranging and discussing plan of care with patient as well as directly providing post-discharge instructions  Additional time then spent on discharge activities  Discharge Medications:  See after visit summary for reconciled discharge medications provided to patient and family        ** Please Note: This note has been constructed using a voice recognition system **

## 2021-06-06 NOTE — ASSESSMENT & PLAN NOTE
We made her NPO and stopped all her narcotics    Her nausea vomiting quickly resolved with antiemetics    Encouraged her to not use narcotics as it will likely make her gastroparesis worse

## 2021-06-06 NOTE — UTILIZATION REVIEW
Initial Clinical Review    Admission: Date/Time/Statement:   Admission Orders (From admission, onward)     Ordered        06/05/21 1211  Place in Observation  Once                   Orders Placed This Encounter   Procedures    Place in Observation     Standing Status:   Standing     Number of Occurrences:   1     Order Specific Question:   Level of Care     Answer:   Med Surg [16]     ED Arrival Information     Expected Arrival Acuity Means of Arrival Escorted By Service Admission Type    - 6/5/2021 07:10 Urgent Ambulance [facility] ([address]) Hospitalist Urgent    Arrival Complaint    vomiting        Chief Complaint   Patient presents with    Vomiting     Pt brought in by EMS for vomiting  Hx gastric stimulator and pancreatitis  Vomiting since 04:00 this am       Initial Presentation:  49 yo female presented to ED from home via EMS for abdominal pain, nausea and vomiting  Pain upper abdoen with radiation to back H/o gastroparesis s/p pacemaker, IBS, DM  She saw GI for follow up on 5/10 and is on PPI BID, Carafate, Phenergan  EGD on 5/13 WNL  On exam  Appears uncomfortable, rolling around stretcher, Diffuse abdominal tenderness to palpation, no rebound/guarding, no back/CVA ttp  Plan bowel rest NPO, IVF antiemetics and supportive care  GI consult She has multiple ER visits and hospitalizations for abdominal pain, nausea, vomiting  Most recently 4/29 through 5/1, then in the emergency room 5/5 and 5/9  She is extremely agitated, rolling in bed, sitting up and lying down  Complains of diffuse abdominal pain, nausea vomiting  States she vomited while here in the ER  Her appetite is decreased and she has lost 6 lb in the last 6 months  Assessment and Plan:  1  Gastroparesis, status post gastric stimulator  Keep NPO for now  Consider scopolamine patch as suggested by Dr Caitie Fernandes  Alternately could use Phenergan and Zofran  Pantoprazole 40 mg daily  2   Abdominal pain, possibly secondary to gastroparesis  No acute abnormality on CT    Would avoid narcotics if possible       ED Triage Vitals   Temperature Pulse Respirations Blood Pressure SpO2   06/05/21 0907 06/05/21 0713 06/05/21 0713 06/05/21 0713 06/05/21 0713   97 5 °F (36 4 °C) 81 18 (!) 175/96 98 %      Temp Source Heart Rate Source Patient Position - Orthostatic VS BP Location FiO2 (%)   06/05/21 0907 06/05/21 0713 06/05/21 0713 06/05/21 0713 --   Oral Monitor Lying Right arm       Pain Score       06/05/21 0756       Worst Possible Pain          Wt Readings from Last 1 Encounters:   05/13/21 70 8 kg (156 lb)     Additional Vital Signs:   MAP (mmHg)  SpO2  O2 Device  Patient Position - Orthostatic VS             06/06/21 08:34:42  99 4 °F (37 4 °C)  --  --  --  --  --  --  --   06/06/21 0813  --  --  --  --  --  --  None (Room air)  --   06/06/21 0730  --  98  18  173/102Abnormal   --  99 %  None (Room air)  Lying   06/06/21 0239  --  --  --  167/90  --  --  --  --   06/06/21 0121  --  --  --  196/98Abnormal   --  --  --  Lying   06/05/21 2325  97 9 °F (36 6 °C)  --  17  174/101Abnormal   125  --  --  --   06/05/21 14:59:04  98 1 °F (36 7 °C)  73  17  160/110Abnormal   130  96 %  None (Room air)  Lying   06/05/21 12:59:03  97 8 °F (36 6 °C)  64  18  155/90  112  98 %  None (Room air)  Lying   06/05/21 1242  --  85  17  180/99Abnormal   --  100 %  None (Room air)  Lying   06/05/21 1012  --  90  18  141/97  --  100 %  None (Room air)  Sitting   06/05/21 0907  97 5 °F (36 4 °C)  --  --  --  --  --  --  --       Pertinent Labs/Diagnostic Test Results:       Results from last 7 days   Lab Units 06/06/21  0437 06/05/21  0733   WBC Thousand/uL 9 66 12 54*   HEMOGLOBIN g/dL 14 3 14 2   HEMATOCRIT % 43 2 43 9   PLATELETS Thousands/uL 281 300   NEUTROS ABS Thousands/µL 8 38* 10 49*         Results from last 7 days   Lab Units 06/06/21  0437 06/05/21  0733   SODIUM mmol/L 140 148*   POTASSIUM mmol/L 3 9 3 8   CHLORIDE mmol/L 105 110*   CO2 mmol/L 22 24 ANION GAP mmol/L 13 14*   BUN mg/dL 26* 32*   CREATININE mg/dL 1 54* 1 91*   EGFR ml/min/1 73sq m 39 30   CALCIUM mg/dL 9 6 9 2   MAGNESIUM mg/dL 2 0  --      Results from last 7 days   Lab Units 06/05/21  0733   AST U/L 18   ALT U/L 21   ALK PHOS U/L 171*   TOTAL PROTEIN g/dL 8 3*   ALBUMIN g/dL 3 9   TOTAL BILIRUBIN mg/dL 0 14*   BILIRUBIN DIRECT mg/dL 0 05     Results from last 7 days   Lab Units 06/06/21  0753 06/06/21  0608 06/06/21  0026 06/05/21  2046 06/05/21  1608   POC GLUCOSE mg/dl 130 137 147* 158* 152*     Results from last 7 days   Lab Units 06/06/21  0437 06/05/21  0733   GLUCOSE RANDOM mg/dL 136 122     Results from last 7 days   Lab Units 06/05/21  0733   TROPONIN I ng/mL <0 02     Results from last 7 days   Lab Units 06/05/21  0733   LIPASE u/L 435*     Results from last 7 days   Lab Units 06/05/21  1013   CLARITY UA  Clear   COLOR UA  Yellow   SPEC GRAV UA  1 025   PH UA  7 0   GLUCOSE UA mg/dl Negative   KETONES UA mg/dl Negative   BLOOD UA  Trace*   PROTEIN UA mg/dl 100 (2+)*   NITRITE UA  Negative   BILIRUBIN UA  Negative   UROBILINOGEN UA E U /dl 0 2   LEUKOCYTES UA  Negative   WBC UA /hpf None Seen   RBC UA /hpf None Seen   BACTERIA UA /hpf None Seen   EPITHELIAL CELLS WET PREP /hpf Occasional     CT A/ P  06-05-21  No acute CT abnormality to account for the patient's abdominal symptoms      ED Treatment:   Medication Administration from 06/05/2021 0710 to 06/05/2021 1255       Date/Time Order Dose Route Action     06/05/2021 0729 metoclopramide (REGLAN) injection 10 mg 10 mg Intravenous Given     06/05/2021 0728 diphenhydrAMINE (BENADRYL) injection 25 mg 25 mg Intravenous Given     06/05/2021 0739 lactated ringers bolus 1,000 mL 1,000 mL Intravenous New Bag     06/05/2021 0744 ondansetron (ZOFRAN) injection 4 mg 4 mg Intravenous Given     06/05/2021 0756 fentanyl citrate (PF) 100 MCG/2ML 50 mcg 50 mcg Intravenous Given     06/05/2021 0851 LORazepam (ATIVAN) injection 1 mg 1 mg Intravenous Given     06/05/2021 0907 pantoprazole (PROTONIX) injection 40 mg 40 mg Intravenous Given     06/05/2021 0909 ondansetron (ZOFRAN) injection 4 mg 4 mg Intravenous Given     06/05/2021 1023 haloperidol lactate (HALDOL) injection 5 mg 5 mg Intramuscular Given     06/05/2021 1205 scopolamine (TRANSDERM-SCOP) 1 5 mg/3 days TD 72 hr patch 1 patch 1 patch Transdermal Medication Applied     06/05/2021 1226 pantoprazole (PROTONIX) injection 40 mg 40 mg Intravenous Given        Past Medical History:   Diagnosis Date    ADHD     Anemia of chronic disease     Anxiety     Asthma     Asthma     Borderline personality disorder (HCC)     Cataplexy     Chronic abdominal pain     CKD (chronic kidney disease) stage 3, GFR 30-59 ml/min (HCC)     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[facility] )     Cushing syndrome ([facility]     [facility]     DVT (deep venous thrombosis) (HCC)     GERD (gastroesophageal reflux disease)     History of acute pancreatitis     felt secondary to Bactrim    History of transfusion     HTN (hypertension)     Hypertension     Liver disease     fatty liver    Microscopic polyangiitis (HCC)     Morbid obesity (HCC)     MPA (microscopic polyangiitis) (HCC)     Ovarian cyst     PTSD (post-traumatic stress disorder)     Renal disorder     Self-inflicted injury     self inflicted skin wounds    Wegener's granulomatosis with renal involvement ([facility] ) 2015     Present on Admission:   Nausea and vomiting   Gastroparesis      Admitting Diagnosis: Gastroparesis [K31 84]  Vomiting [R11 10]  Epigastric pain [R10 13]  Intractable nausea and vomiting [R11 2]  Age/Sex: 48 y o  female  Admission Orders:  Scheduled Medications:  buPROPion, 300 mg, Oral, QAM  dicyclomine, 20 mg, Oral, Q6H  lubiprostone, 8 mcg, Oral, BID With Meals  pantoprazole, 40 mg, Intravenous, Q12H PAULINE  QUEtiapine, 150 mg, Oral, HS  saccharomyces boulardii, 250 mg, Oral, BID  [START ON 6/8/2021] scopolamine, 1 patch, Transdermal, Q72H      Continuous IV Infusions:  sodium chloride, 100 mL/hr, Intravenous, Continuous      PRN Meds:  albuterol, 2 puff, Inhalation, Q4H PRN  hydrALAZINE, 10 mg, Intravenous, Q6H PRN  X  1  ondansetron, 4 mg, Intravenous, Q4H PRN     X 4  promethazine, 12 5 mg, Intravenous, Q6H PRN    X 1        IP CONSULT TO GASTROENTEROLOGY  IP CONSULT TO GASTROENTEROLOGY   NPO  Blood sugars q 6 hrs      Network Utilization Review Department  ATTENTION: Please call with any questions or concerns to [phone] and carefully listen to the prompts so that you are directed to the right person  All voicemails are confidential   Harlene Pair all requests for admission clinical reviews, approved or denied determinations and any other requests to dedicated fax number below belonging to the campus where the patient is receiving treatment   List of dedicated fax numbers for the Facilities:  [address] DENIALS (Administrative/Medical Necessity) [phone]   [address] (Maternity/NICU/Pediatrics) [phone]   [address] [place] [address] [address] [place] [address] [id number] [place] [address] [address] [place] [address] [phone]

## 2021-06-07 ENCOUNTER — TELEPHONE (OUTPATIENT)
Dept: FAMILY MEDICINE CLINIC | Facility: CLINIC | Age: 51
End: 2021-06-07

## 2021-06-07 DIAGNOSIS — M31.31 GRANULOMATOSIS WITH POLYANGIITIS WITH RENAL INVOLVEMENT (HCC): Chronic | ICD-10-CM

## 2021-06-07 DIAGNOSIS — G89.4 CHRONIC PAIN SYNDROME: ICD-10-CM

## 2021-06-08 ENCOUNTER — TELEPHONE (OUTPATIENT)
Dept: FAMILY MEDICINE CLINIC | Facility: CLINIC | Age: 51
End: 2021-06-08

## 2021-06-08 ENCOUNTER — TRANSITIONAL CARE MANAGEMENT (OUTPATIENT)
Dept: FAMILY MEDICINE CLINIC | Facility: CLINIC | Age: 51
End: 2021-06-08

## 2021-06-09 ENCOUNTER — TELEPHONE (OUTPATIENT)
Dept: FAMILY MEDICINE CLINIC | Facility: CLINIC | Age: 51
End: 2021-06-09

## 2021-06-09 RX ORDER — TIZANIDINE HYDROCHLORIDE 4 MG/1
CAPSULE, GELATIN COATED ORAL
Qty: 90 CAPSULE | Refills: 0 | Status: SHIPPED | OUTPATIENT
Start: 2021-06-09 | End: 2021-07-06

## 2021-06-09 RX ORDER — OXYCODONE AND ACETAMINOPHEN 10; 325 MG/1; MG/1
1 TABLET ORAL EVERY 6 HOURS PRN
Qty: 120 TABLET | Refills: 0 | Status: SHIPPED | OUTPATIENT
Start: 2021-06-09 | End: 2021-09-07 | Stop reason: SDUPTHER

## 2021-06-10 NOTE — TELEPHONE ENCOUNTER
Pt is aware and I was also calling to see if she wanted to move her appt closer due to recent d/c but sounded ill on the phone and said she couldn't talk right now, said she would call back   She sounded like she was going to vomit so she may end up going back to ED

## 2021-06-11 DIAGNOSIS — R21 RASH: ICD-10-CM

## 2021-06-11 RX ORDER — DIAPER,BRIEF,INFANT-TODD,DISP
EACH MISCELLANEOUS
Qty: 56 G | Refills: 0 | Status: SHIPPED | OUTPATIENT
Start: 2021-06-11 | End: 2021-06-30

## 2021-06-15 DIAGNOSIS — K21.9 GASTROESOPHAGEAL REFLUX DISEASE, UNSPECIFIED WHETHER ESOPHAGITIS PRESENT: ICD-10-CM

## 2021-06-18 DIAGNOSIS — F31.9 BIPOLAR 1 DISORDER (HCC): ICD-10-CM

## 2021-06-21 RX ORDER — DEXTROAMPHETAMINE SACCHARATE, AMPHETAMINE ASPARTATE MONOHYDRATE, DEXTROAMPHETAMINE SULFATE AND AMPHETAMINE SULFATE 5; 5; 5; 5 MG/1; MG/1; MG/1; MG/1
20 CAPSULE, EXTENDED RELEASE ORAL 2 TIMES DAILY
Qty: 60 CAPSULE | Refills: 0 | Status: SHIPPED | OUTPATIENT
Start: 2021-06-21 | End: 2021-07-21 | Stop reason: SDUPTHER

## 2021-06-23 NOTE — TELEPHONE ENCOUNTER
Cielo Xavier called from [facility] regarding PT needing refill for scopolamine (TRANSDERM-SCOP) 1 5 mg/3 days TD 72 hr patch 23-Jun-2021 15:51

## 2021-06-24 ENCOUNTER — OFFICE VISIT (OUTPATIENT)
Dept: OBGYN CLINIC | Facility: CLINIC | Age: 51
End: 2021-06-24

## 2021-06-24 VITALS
SYSTOLIC BLOOD PRESSURE: 115 MMHG | DIASTOLIC BLOOD PRESSURE: 78 MMHG | BODY MASS INDEX: 28.53 KG/M2 | HEART RATE: 70 BPM | WEIGHT: 156 LBS

## 2021-06-24 DIAGNOSIS — F17.200 SMOKING: ICD-10-CM

## 2021-06-24 DIAGNOSIS — F11.90 CHRONIC NARCOTIC USE: ICD-10-CM

## 2021-06-24 DIAGNOSIS — N95.2 VAGINAL ATROPHY: Primary | ICD-10-CM

## 2021-06-24 DIAGNOSIS — M31.7 MPA (MICROSCOPIC POLYANGIITIS) (HCC): Chronic | ICD-10-CM

## 2021-06-24 DIAGNOSIS — F31.9 BIPOLAR 1 DISORDER (HCC): ICD-10-CM

## 2021-06-24 DIAGNOSIS — N18.4 BENIGN HYPERTENSION WITH CKD (CHRONIC KIDNEY DISEASE) STAGE IV (HCC): Chronic | ICD-10-CM

## 2021-06-24 DIAGNOSIS — B02.29 POSTHERPETIC NEURALGIA: ICD-10-CM

## 2021-06-24 DIAGNOSIS — Z79.899 CONTROLLED SUBSTANCE AGREEMENT SIGNED: ICD-10-CM

## 2021-06-24 DIAGNOSIS — E78.5 DYSLIPIDEMIA: Chronic | ICD-10-CM

## 2021-06-24 DIAGNOSIS — M31.30 GRANULOMATOSIS WITH POLYANGIITIS, UNSPECIFIED WHETHER RENAL INVOLVEMENT (HCC): ICD-10-CM

## 2021-06-24 DIAGNOSIS — R10.2 CHRONIC PELVIC PAIN IN FEMALE: ICD-10-CM

## 2021-06-24 DIAGNOSIS — K58.9 IRRITABLE BOWEL SYNDROME, UNSPECIFIED TYPE: ICD-10-CM

## 2021-06-24 DIAGNOSIS — R03.0 ELEVATED BLOOD PRESSURE READING: ICD-10-CM

## 2021-06-24 DIAGNOSIS — G62.9 SMALL FIBER NEUROPATHY: ICD-10-CM

## 2021-06-24 DIAGNOSIS — G62.9 NEUROPATHY: ICD-10-CM

## 2021-06-24 DIAGNOSIS — R10.9 INTRACTABLE ABDOMINAL PAIN: ICD-10-CM

## 2021-06-24 DIAGNOSIS — M94.0 COSTOCHONDRITIS: ICD-10-CM

## 2021-06-24 DIAGNOSIS — G89.29 CHRONIC PELVIC PAIN IN FEMALE: ICD-10-CM

## 2021-06-24 DIAGNOSIS — I12.9 BENIGN HYPERTENSION WITH CKD (CHRONIC KIDNEY DISEASE) STAGE IV (HCC): Chronic | ICD-10-CM

## 2021-06-24 LAB
BACTERIA UR QL AUTO: ABNORMAL /HPF
BILIRUB UR QL STRIP: NEGATIVE
CLARITY UR: CLEAR
COLOR UR: YELLOW
GLUCOSE UR STRIP-MCNC: NEGATIVE MG/DL
HGB UR QL STRIP.AUTO: NEGATIVE
HYALINE CASTS #/AREA URNS LPF: ABNORMAL /LPF
KETONES UR STRIP-MCNC: NEGATIVE MG/DL
LEUKOCYTE ESTERASE UR QL STRIP: ABNORMAL
NITRITE UR QL STRIP: NEGATIVE
NON-SQ EPI CELLS URNS QL MICRO: ABNORMAL /HPF
PH UR STRIP.AUTO: 6 [PH]
PROT UR STRIP-MCNC: ABNORMAL MG/DL
RBC #/AREA URNS AUTO: ABNORMAL /HPF
SP GR UR STRIP.AUTO: 1.02 (ref 1–1.03)
UROBILINOGEN UR QL STRIP.AUTO: 0.2 E.U./DL
WBC #/AREA URNS AUTO: ABNORMAL /HPF

## 2021-06-24 PROCEDURE — 3078F DIAST BP <80 MM HG: CPT | Performed by: OBSTETRICS & GYNECOLOGY

## 2021-06-24 PROCEDURE — 3066F NEPHROPATHY DOC TX: CPT | Performed by: PHYSICIAN ASSISTANT

## 2021-06-24 PROCEDURE — 81001 URINALYSIS AUTO W/SCOPE: CPT | Performed by: OBSTETRICS & GYNECOLOGY

## 2021-06-24 PROCEDURE — 99213 OFFICE O/P EST LOW 20 MIN: CPT | Performed by: OBSTETRICS & GYNECOLOGY

## 2021-06-24 PROCEDURE — 3074F SYST BP LT 130 MM HG: CPT | Performed by: OBSTETRICS & GYNECOLOGY

## 2021-06-24 RX ORDER — CLOBETASOL PROPIONATE 0.5 MG/G
CREAM TOPICAL DAILY
Qty: 30 G | Refills: 0 | Status: SHIPPED | OUTPATIENT
Start: 2021-06-24 | End: 2021-08-16

## 2021-06-24 NOTE — PROGRESS NOTES
OB/GYN  Maggie Gonzalez  [id number]  6/24/2021  9:29 AM      S: Pt is a 48 y o  G0 here to follow-up use of estrace cream for vulvovaginal itching with erythema and atrophy noted 5/12/21  H/o gastroparesis and diffuse abdominal pain, today reports feeling very well overall, denies abdominal pain, vaginal bleeding  Continued intermittent diarrhea and constipation  She reports that she has occasional dysuria, which she describes as "hot" urine  No hematuria  She considered canceling her appointment today, as she has not been using her cream regularly  Reports that, in the last 2 months, she has used it twice  We discussed methods to help her remember how to use it, including setting alarms on her phone or placing the cream near something she does every day, like her toothbrush  The patient is planning to tell her home health care aide about the cream, and she should help her remember to use it every night after her shower  Today, we clarified that her itching/discomfort is more on her vulva than in her vagina; she denies having discomfort internally  Will opt for clobetasol cream instead of estrace  Administration the same, daily for one week, then 4-5 times a week, then 3-4 times a week  O:  Vitals:    06/24/21 1106   BP: 115/78   Pulse: 70     Physical Exam  Vitals reviewed  Constitutional:       Appearance: Normal appearance  Comments: Patient uses a wheelchair; somewhat choreiform movements   Neurological:      Mental Status: She is alert  Remainder of physical exam declined by the patient; will repeat in 2-3 months at next appt      A/P:  Problem List Items Addressed This Visit        Digestive    IBS (irritable bowel syndrome)       Cardiovascular and Mediastinum    MPA (microscopic polyangiitis) (HCC) (Chronic)    Benign hypertension with CKD (chronic kidney disease) stage IV (HCC) (Chronic)    Granulomatosis with polyangiitis (HCC)       Nervous and Auditory    Postherpetic neuralgia Small fiber neuropathy    Neuropathy       Musculoskeletal and Integument    Costochondritis       Genitourinary    Vaginal atrophy - Primary       Other    Dyslipidemia (Chronic)    Bipolar 1 disorder (HCC)    Intractable abdominal pain    Chronic pelvic pain in female    Controlled substance agreement signed    Chronic narcotic use    Elevated blood pressure reading    Smoking            Future Appointments   Date Time Provider Dillan Arteaga   6/24/2021 11:00 AM OBGYN PGY-1 RESIDENT  [address] [place] [address] [address]   6/29/2021 10:00 AM Remedios Luis [address] AL SIG [address]   7/28/2021  2:40 PM [id number] [facility] TEE [address]   5/13/2022  9:00 AM PRASANTH [facility] JACINTA [address]     Collect clean catch urine for UA  D/c estrace cream  Clobetasol 0 05% cream, to be applied to external vulva once daily for one week, then 4-5 nights a week for the second week, and then 3-4 nights a week for the third week    RTC in 3 months for repeat exam       Jose E Zavala MD  OB/GYN PGY-1  6/24/2021  9:29 AM

## 2021-06-28 ENCOUNTER — TELEPHONE (OUTPATIENT)
Dept: NEPHROLOGY | Facility: CLINIC | Age: 51
End: 2021-06-28

## 2021-06-28 NOTE — TELEPHONE ENCOUNTER
Received a call from patient stating that she has not been feeling well  She is having trouble breathing/talking, is very fatigued and has no appetite or interest in drinking anything  I have scheduled a follow up visit for the patient in sept in [place]  Patient will also be going to [facility] o get labs done

## 2021-06-28 NOTE — TELEPHONE ENCOUNTER
Spoke with patient  Feeling weak and tired since Friday  Poor appetite and weight is down 5lb in 3 days  Her BP and blood sugar are reportedly normal at home (last BP was 121/80)  Also complains of shortness of breath but no cough or chest pain  No hemoptysis  Does not sound short of breath during our conversation and states that her breathing is getting better    We will follow up on labs from today

## 2021-06-28 NOTE — TELEPHONE ENCOUNTER
I have tried twice to reach pt however the number listed does not connect nor does it ring  I will make another attempt later today

## 2021-06-28 NOTE — TELEPHONE ENCOUNTER
Pt is calling the office  She states that she is feeling unwell and having some labored breathing but is able to talk with no issues  Pt did states she went to [facility] this morning for lab work which is not yet final  Dr Ricco Delgado is off today so I have sent a TT to Karen Bagley asking that she contact the office to discuss patients sx and to see what she recommends

## 2021-06-29 ENCOUNTER — OFFICE VISIT (OUTPATIENT)
Dept: FAMILY MEDICINE CLINIC | Facility: CLINIC | Age: 51
End: 2021-06-29

## 2021-06-29 ENCOUNTER — TELEPHONE (OUTPATIENT)
Dept: OBGYN CLINIC | Facility: CLINIC | Age: 51
End: 2021-06-29

## 2021-06-29 ENCOUNTER — TELEPHONE (OUTPATIENT)
Dept: NEPHROLOGY | Facility: CLINIC | Age: 51
End: 2021-06-29

## 2021-06-29 VITALS
RESPIRATION RATE: 20 BRPM | TEMPERATURE: 96.5 F | HEART RATE: 77 BPM | SYSTOLIC BLOOD PRESSURE: 130 MMHG | DIASTOLIC BLOOD PRESSURE: 76 MMHG | HEIGHT: 62 IN | OXYGEN SATURATION: 97 % | BODY MASS INDEX: 27.42 KG/M2 | WEIGHT: 149 LBS

## 2021-06-29 DIAGNOSIS — R21 RASH: ICD-10-CM

## 2021-06-29 DIAGNOSIS — G47.411 CATAPLEXY: ICD-10-CM

## 2021-06-29 DIAGNOSIS — E11.22 TYPE 2 DIABETES MELLITUS WITH STAGE 4 CHRONIC KIDNEY DISEASE, WITH LONG-TERM CURRENT USE OF INSULIN (HCC): ICD-10-CM

## 2021-06-29 DIAGNOSIS — N18.4 CKD (CHRONIC KIDNEY DISEASE) STAGE 4, GFR 15-29 ML/MIN (HCC): Primary | ICD-10-CM

## 2021-06-29 DIAGNOSIS — K21.9 GASTROESOPHAGEAL REFLUX DISEASE, UNSPECIFIED WHETHER ESOPHAGITIS PRESENT: ICD-10-CM

## 2021-06-29 DIAGNOSIS — K31.84 GASTROPARESIS: ICD-10-CM

## 2021-06-29 DIAGNOSIS — M31.30 GRANULOMATOSIS WITH POLYANGIITIS, UNSPECIFIED WHETHER RENAL INVOLVEMENT (HCC): ICD-10-CM

## 2021-06-29 DIAGNOSIS — Z79.4 TYPE 2 DIABETES MELLITUS WITH STAGE 4 CHRONIC KIDNEY DISEASE, WITH LONG-TERM CURRENT USE OF INSULIN (HCC): ICD-10-CM

## 2021-06-29 DIAGNOSIS — N18.4 TYPE 2 DIABETES MELLITUS WITH STAGE 4 CHRONIC KIDNEY DISEASE, WITH LONG-TERM CURRENT USE OF INSULIN (HCC): ICD-10-CM

## 2021-06-29 DIAGNOSIS — R29.898 WEAKNESS OF BOTH LOWER EXTREMITIES: ICD-10-CM

## 2021-06-29 DIAGNOSIS — F90.9 ATTENTION DEFICIT HYPERACTIVITY DISORDER (ADHD), UNSPECIFIED ADHD TYPE: Primary | ICD-10-CM

## 2021-06-29 LAB
ALBUMIN SERPL-MCNC: 4.4 G/DL (ref 3.6–5.1)
ALBUMIN/GLOB SERPL: 1.4 (CALC) (ref 1–2.5)
ALP SERPL-CCNC: 147 U/L (ref 37–153)
ALT SERPL-CCNC: 13 U/L (ref 6–29)
APPEARANCE UR: CLEAR
AST SERPL-CCNC: 14 U/L (ref 10–35)
BACTERIA UR QL AUTO: ABNORMAL /HPF
BASOPHILS # BLD AUTO: 20 CELLS/UL (ref 0–200)
BASOPHILS NFR BLD AUTO: 0.4 %
BILIRUB SERPL-MCNC: 0.3 MG/DL (ref 0.2–1.2)
BILIRUB UR QL STRIP: NEGATIVE
BUN SERPL-MCNC: 44 MG/DL (ref 7–25)
BUN/CREAT SERPL: 19 (CALC) (ref 6–22)
CALCIUM SERPL-MCNC: 9.8 MG/DL (ref 8.6–10.4)
CHLORIDE SERPL-SCNC: 110 MMOL/L (ref 98–110)
CO2 SERPL-SCNC: 20 MMOL/L (ref 20–32)
COLOR UR: YELLOW
CREAT SERPL-MCNC: 2.35 MG/DL (ref 0.5–1.05)
CREAT UR-MCNC: 174 MG/DL (ref 20–275)
EOSINOPHIL # BLD AUTO: 31 CELLS/UL (ref 15–500)
EOSINOPHIL NFR BLD AUTO: 0.6 %
ERYTHROCYTE [DISTWIDTH] IN BLOOD BY AUTOMATED COUNT: 13.2 % (ref 11–15)
GLOBULIN SER CALC-MCNC: 3.1 G/DL (CALC) (ref 1.9–3.7)
GLUCOSE SERPL-MCNC: 111 MG/DL (ref 65–99)
GLUCOSE UR QL STRIP: NEGATIVE
HCT VFR BLD AUTO: 40.3 % (ref 35–45)
HGB BLD-MCNC: 13.3 G/DL (ref 11.7–15.5)
HGB UR QL STRIP: NEGATIVE
HYALINE CASTS #/AREA URNS LPF: ABNORMAL /LPF
KETONES UR QL STRIP: NEGATIVE
LEUKOCYTE ESTERASE UR QL STRIP: ABNORMAL
LYMPHOCYTES # BLD AUTO: 1086 CELLS/UL (ref 850–3900)
LYMPHOCYTES NFR BLD AUTO: 21.3 %
MCH RBC QN AUTO: 29.8 PG (ref 27–33)
MCHC RBC AUTO-ENTMCNC: 33 G/DL (ref 32–36)
MCV RBC AUTO: 90.4 FL (ref 80–100)
MONOCYTES # BLD AUTO: 444 CELLS/UL (ref 200–950)
MONOCYTES NFR BLD AUTO: 8.7 %
NEUTROPHILS # BLD AUTO: 3519 CELLS/UL (ref 1500–7800)
NEUTROPHILS NFR BLD AUTO: 69 %
NITRITE UR QL STRIP: NEGATIVE
PH UR STRIP: ABNORMAL [PH] (ref 5–8)
PHOSPHATE SERPL-MCNC: 3.5 MG/DL (ref 2.5–4.5)
PLATELET # BLD AUTO: 313 THOUSAND/UL (ref 140–400)
PMV BLD REES-ECKER: 10.6 FL (ref 7.5–12.5)
POTASSIUM SERPL-SCNC: 4.7 MMOL/L (ref 3.5–5.3)
PROT SERPL-MCNC: 7.5 G/DL (ref 6.1–8.1)
PROT UR QL STRIP: ABNORMAL
PROT UR-MCNC: 51 MG/DL (ref 5–24)
PROT/CREAT UR: 0.29 MG/MG CREAT (ref 0.02–0.16)
PROT/CREAT UR: 293 MG/G CREAT (ref 21–161)
RBC # BLD AUTO: 4.46 MILLION/UL (ref 3.8–5.1)
RBC #/AREA URNS HPF: ABNORMAL /HPF
SL AMB EGFR AFRICAN AMERICAN: 27 ML/MIN/1.73M2
SL AMB EGFR NON AFRICAN AMERICAN: 23 ML/MIN/1.73M2
SODIUM SERPL-SCNC: 139 MMOL/L (ref 135–146)
SP GR UR STRIP: 1.02 (ref 1–1.03)
SQUAMOUS #/AREA URNS HPF: ABNORMAL /HPF
WBC # BLD AUTO: 5.1 THOUSAND/UL (ref 3.8–10.8)
WBC #/AREA URNS HPF: ABNORMAL /HPF

## 2021-06-29 PROCEDURE — 3061F NEG MICROALBUMINURIA REV: CPT | Performed by: PHYSICIAN ASSISTANT

## 2021-06-29 PROCEDURE — 1111F DSCHRG MED/CURRENT MED MERGE: CPT | Performed by: PHYSICIAN ASSISTANT

## 2021-06-29 PROCEDURE — 3075F SYST BP GE 130 - 139MM HG: CPT | Performed by: PHYSICIAN ASSISTANT

## 2021-06-29 PROCEDURE — 99214 OFFICE O/P EST MOD 30 MIN: CPT | Performed by: PHYSICIAN ASSISTANT

## 2021-06-29 PROCEDURE — 1036F TOBACCO NON-USER: CPT | Performed by: PHYSICIAN ASSISTANT

## 2021-06-29 PROCEDURE — 3078F DIAST BP <80 MM HG: CPT | Performed by: PHYSICIAN ASSISTANT

## 2021-06-29 NOTE — TELEPHONE ENCOUNTER
Patient sees Kosta Hernandez  She is calling regarding her lab results because she can view them on my chart and has concerns  Her breathing is better but still has a very low appetite and is trying to push fluids  Please contact patient [phone]

## 2021-06-29 NOTE — LETTER
June 29, 2021     Patient: Cuba Lechuga   YOB: 1970   Date of Visit: 6/29/2021       To Whom it May Concern:    Cuba Lechuga is under my professional care  She was seen in my office on 6/29/2021  Due to patient's medical conditions, it is warranted for patient to wear a face shield instead of a face mask  If you have any questions or concerns, please don't hesitate to call           Sincerely,          Katarzyna Thrasher PA-C        CC: No Recipients

## 2021-06-29 NOTE — PROGRESS NOTES
Assessment/Plan:    ADHD  - Stable  Continue Adderall XR 20 mg twice daily      Gastroparesis/GERD   - Status post laparoscopic pyloroplasty with gastric stimulator placement on 06/22/2020 with [address] 60 mg once daily as prescribed through Gastroenterology  - Advised to follow-up with gastroenterology as scheduled  - Advised to follow-up with Katia Quick as scheduled       Constipation  - Continue taking Linzess 72 mcg once daily as prescribed through Gastroenterology  - Continue follow-up with gastroenterology as scheduled      Type 2 diabetes mellitus   - Last HgbA1c from February 2021 was 6 0    - Continue NovoLog 6 units, 3 times daily with meals and Toujeo 22 units once daily  Weakness in lower extremities/cataplexy/Wegener's granulomatosis/small fiber neuropathy   - Patient is requesting to only be prescribed oxycodone instead of combined oxycodone and acetaminophen  Will change prescription to only oxycodone 10 mg, every 6 hours as needed for pain when patient is due for her next refill   - Patient has naloxone prescription, to be used if needed  Diagnoses and all orders for this visit:    Attention deficit hyperactivity disorder (ADHD), unspecified ADHD type    Gastroparesis    Gastroesophageal reflux disease, unspecified whether esophagitis present    Type 2 diabetes mellitus with stage 4 chronic kidney disease, with long-term current use of insulin ([facility])    Weakness of both lower extremities    Granulomatosis with polyangiitis, unspecified whether renal involvement ([facility] )    Cataplexy          All of patients questions were answered  Patient understands and agrees with the above plan  Return in about 3 months (around 9/29/2021) for Next scheduled follow up abdominal pain, ADHD      Carol Garcia PA-C  06/29/21  [address] [facility] [place]          Subjective:     Patient ID: Yazmin Kaba  is a 48 y o  female  has a past medical history of ADHD, Anemia of chronic disease, Anxiety, Asthma, Asthma, Borderline personality disorder ([facility] ), Cataplexy, Chronic abdominal pain, CKD (chronic kidney disease) stage 3, GFR 30-59 ml/min ([facility]), 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[facility] ), Cushing syndrome ([facility] ), Diabetes mellitus ([facility] ), DVT (deep venous thrombosis) ([place] [address] ), GERD (gastroesophageal reflux disease), History of acute pancreatitis, History of transfusion, HTN (hypertension), Hypertension, Liver disease, Microscopic polyangiitis ([facility] ), Morbid obesity ([place] [address] ), MPA (microscopic polyangiitis) ([place] [address] ), Ovarian cyst, PTSD (post-traumatic stress disorder), Renal disorder, Self-inflicted injury, and Wegener's granulomatosis with renal involvement ([place] [address] )  She also has no past medical history of Hyperlipidemia  who presents today in office for follow up  - Patient is a 48 y o  female who presents today for follow up  Patient notes currently she only has a home health care aide coming to her house from 8:00 a m  -4:00 p m  [facility] Patient notes she is approved to have someone there from 4:00 p m  to 8:00 p m  as well, but they do not have enough staff available to have that for the patient  Patient notes she now requires to have her aide come from 4:00 a m -8:00 a m  and she needs a letter indicating that  Patient notes she has been to the emergency room a few times over the past few months because she did not have anyone to help her  Patient notes she has also fallen a few times in the morning when she did not have any help and she was unable to get up on her own  Patient notes she also does not have any covered on the weekends due to staff availability  Patient notes she tends to experience her stomach issues around 5:00 a m  and patient notes she is unable to get out of bed or do anything on her own so that is why she requires to have an aide with her       - Patient notes she is interested in starting this program where she would be working with horses to help with her nerves anxiety    Patient notes she requires to have a physical form completed  Patient notes she has worked with horses in the past and she is really looking forward to starting this program   Patient notes she is aware that she cannot ride horses, but she would like to brush them and help clean and feet them  - Patient notes instead of taking combined oxycodone and Tylenol, she would like to only be taking oxycodone  The following portions of the patient's history were reviewed and updated as appropriate: allergies, current medications, past family history, past medical history, past social history, past surgical history and problem list         Review of Systems   Constitutional: Negative for chills and fever  HENT: Negative for congestion and sore throat  Eyes: Negative for pain and visual disturbance  Respiratory: Negative for cough, chest tightness and shortness of breath  Cardiovascular: Negative for chest pain and palpitations  Gastrointestinal: Positive for abdominal pain, diarrhea and nausea  Negative for constipation and vomiting  Genitourinary: Negative for difficulty urinating and dysuria  Musculoskeletal: Positive for arthralgias, gait problem and myalgias  Neurological: Negative for dizziness and headaches  Psychiatric/Behavioral: The patient is not nervous/anxious  Objective:   Vitals:    06/29/21 0952   BP: 130/76   BP Location: Right arm   Patient Position: Sitting   Cuff Size: Standard   Pulse: 77   Resp: 20   Temp: (!) 96 5 °F (35 8 °C)   TempSrc: Temporal   SpO2: 97%   Weight: 67 6 kg (149 lb)   Height: 5' 2" (1 575 m)         Physical Exam  Vitals and nursing note reviewed  Constitutional:       Appearance: She is well-developed  Comments: Examined in motor scooter  HENT:      Head: Normocephalic and atraumatic        Right Ear: External ear normal       Left Ear: External ear normal       Nose: Nose normal    Eyes:      Conjunctiva/sclera: Conjunctivae normal  Cardiovascular:      Rate and Rhythm: Normal rate and regular rhythm  Heart sounds: Normal heart sounds  No murmur heard  Pulmonary:      Effort: Pulmonary effort is normal       Breath sounds: Normal breath sounds  No wheezing  Musculoskeletal:      Cervical back: Normal range of motion and neck supple  Skin:     General: Skin is warm and dry  Neurological:      Mental Status: She is alert and oriented to person, place, and time  Motor: Abnormal muscle tone present

## 2021-06-29 NOTE — LETTER
June 29, 2021     Patient: Kali Duval   YOB: 1970   Date of Visit: 6/29/2021       To Whom it May Concern:    Kali Duval is under my professional care  She was seen in my office on 6/29/2021  Patient has known past medical history of gastroparesis, GERD, constipation, ADHD, type 2 diabetes mellitus, cataplexy, Wegener's granulomatosis, small fiber neuropathy, bipolar 1 disorder  Patient is wheel chair bound  Due to patient's medical conditions, patient requires to have an additional 4 hours each day of the week (Sunday through Saturday) of home health care  Patient has been to the hospital multiple times over the past few months and could possibly have been avoided if patient had home health care aide present  Patient cannot perform any activities of daily living on her own including dressing, bathing, cooking  Patient requires assistance to transfer in and out of her bed and chairs  If you have any questions or concerns, please don't hesitate to call           Sincerely,          Katarzyna Thrasher PA-C        CC: No Recipients

## 2021-06-29 NOTE — PROGRESS NOTES
Reviewed laboratory studies, unfortunately creatinine elevated to 2 35  Previous baseline creatinine high 1s  She states that overall she has no appetite  Over the weekend she was having abdominal cramping but that appears to have resolved  Today she seems to be drinking more fluids and was able to drink half bottle of Gatorade  She will continue to try to increase her fluid intake will plan on repeating laboratory studies on Friday

## 2021-06-29 NOTE — TELEPHONE ENCOUNTER
This was addressed by Dr Greer Canales via telephone  Pt instructed to push fluids and repeat labs on Friday to reassess kidney fx

## 2021-06-30 RX ORDER — DIAPER,BRIEF,INFANT-TODD,DISP
EACH MISCELLANEOUS
Qty: 56 G | Refills: 0 | Status: SHIPPED | OUTPATIENT
Start: 2021-06-30 | End: 2021-07-14

## 2021-07-06 DIAGNOSIS — M54.41 CHRONIC LOW BACK PAIN WITH BILATERAL SCIATICA, UNSPECIFIED BACK PAIN LATERALITY: ICD-10-CM

## 2021-07-06 DIAGNOSIS — G89.29 CHRONIC LOW BACK PAIN WITH BILATERAL SCIATICA, UNSPECIFIED BACK PAIN LATERALITY: ICD-10-CM

## 2021-07-06 DIAGNOSIS — M54.42 CHRONIC LOW BACK PAIN WITH BILATERAL SCIATICA, UNSPECIFIED BACK PAIN LATERALITY: ICD-10-CM

## 2021-07-06 DIAGNOSIS — G89.4 CHRONIC PAIN SYNDROME: ICD-10-CM

## 2021-07-06 DIAGNOSIS — M31.31 GRANULOMATOSIS WITH POLYANGIITIS WITH RENAL INVOLVEMENT (HCC): Chronic | ICD-10-CM

## 2021-07-06 RX ORDER — TIZANIDINE HYDROCHLORIDE 4 MG/1
CAPSULE, GELATIN COATED ORAL
Qty: 90 CAPSULE | Refills: 0 | Status: SHIPPED | OUTPATIENT
Start: 2021-07-06 | End: 2021-08-05 | Stop reason: SDUPTHER

## 2021-07-06 RX ORDER — OXYCODONE HYDROCHLORIDE 10 MG/1
10 TABLET ORAL EVERY 6 HOURS PRN
Qty: 120 TABLET | Refills: 0 | Status: SHIPPED | OUTPATIENT
Start: 2021-07-06 | End: 2021-08-05 | Stop reason: SDUPTHER

## 2021-07-06 RX ORDER — OXYCODONE AND ACETAMINOPHEN 10; 325 MG/1; MG/1
1 TABLET ORAL EVERY 6 HOURS PRN
Qty: 120 TABLET | Refills: 0 | Status: CANCELLED | OUTPATIENT
Start: 2021-07-06

## 2021-07-08 ENCOUNTER — TELEPHONE (OUTPATIENT)
Dept: FAMILY MEDICINE CLINIC | Facility: CLINIC | Age: 51
End: 2021-07-08

## 2021-07-08 NOTE — TELEPHONE ENCOUNTER
Patient brought physical form for Equestrian program at her recent appointment  Form has been completed and signed  I have placed the form in your bin next to your office  Patient prefers the form to be mailed to her house  Could you please mail it to the patient? Please make a copy of the form to be scanned into patient's chart before mailing  Thank you!

## 2021-07-09 NOTE — TELEPHONE ENCOUNTER
Pt had called in regards of the recent form that need it to be filled out by her PCP  I called pt to informed that it was placed on the mailed today

## 2021-07-13 ENCOUNTER — TELEPHONE (OUTPATIENT)
Dept: FAMILY MEDICINE CLINIC | Facility: CLINIC | Age: 51
End: 2021-07-13

## 2021-07-13 DIAGNOSIS — J45.20 MILD INTERMITTENT ASTHMA WITHOUT COMPLICATION: Chronic | ICD-10-CM

## 2021-07-13 DIAGNOSIS — R21 RASH: ICD-10-CM

## 2021-07-13 NOTE — TELEPHONE ENCOUNTER
I received a message this morning from [facility] requesting the same thing (med list)    Patient has had no recent referrals for home health from our office  Last referral was for home PT which is now closed  We need to confirm need for services and she would need to pick one place to receive care from so we can route the appropriate notes to that service line  Upon review of her chart, a form was just filled out for therapeutic riding so if patient is leaving the house for this, she would not be considered home bound for home health PT services

## 2021-07-13 NOTE — TELEPHONE ENCOUNTER
Cal Cramer from [address] [place] [address] left a voicemail on the nurse line requesting the medication list for this pt  I called the pt for clarify the agency who was requesting her information and the pt states she don't know the person because its multiple agency from healthcare calling her

## 2021-07-14 RX ORDER — ALBUTEROL SULFATE 90 UG/1
AEROSOL, METERED RESPIRATORY (INHALATION)
Qty: 18 G | Refills: 1 | Status: SHIPPED | OUTPATIENT
Start: 2021-07-14 | End: 2021-12-13 | Stop reason: HOSPADM

## 2021-07-14 RX ORDER — DIAPER,BRIEF,INFANT-TODD,DISP
EACH MISCELLANEOUS
Qty: 56 G | Refills: 0 | Status: SHIPPED | OUTPATIENT
Start: 2021-07-14 | End: 2021-08-11

## 2021-07-16 ENCOUNTER — OFFICE VISIT (OUTPATIENT)
Dept: FAMILY MEDICINE CLINIC | Facility: CLINIC | Age: 51
End: 2021-07-16

## 2021-07-16 VITALS
HEIGHT: 62 IN | WEIGHT: 149 LBS | HEART RATE: 83 BPM | RESPIRATION RATE: 16 BRPM | BODY MASS INDEX: 27.42 KG/M2 | SYSTOLIC BLOOD PRESSURE: 122 MMHG | OXYGEN SATURATION: 97 % | TEMPERATURE: 98 F | DIASTOLIC BLOOD PRESSURE: 88 MMHG

## 2021-07-16 DIAGNOSIS — K13.79 MOUTH SORES: Primary | ICD-10-CM

## 2021-07-16 PROCEDURE — 3074F SYST BP LT 130 MM HG: CPT | Performed by: FAMILY MEDICINE

## 2021-07-16 PROCEDURE — 3008F BODY MASS INDEX DOCD: CPT | Performed by: PHYSICIAN ASSISTANT

## 2021-07-16 PROCEDURE — 99213 OFFICE O/P EST LOW 20 MIN: CPT | Performed by: FAMILY MEDICINE

## 2021-07-16 PROCEDURE — 3079F DIAST BP 80-89 MM HG: CPT | Performed by: FAMILY MEDICINE

## 2021-07-16 RX ORDER — LIDOCAINE HYDROCHLORIDE 20 MG/ML
15 SOLUTION OROPHARYNGEAL 4 TIMES DAILY PRN
Qty: 15 ML | Refills: 1 | Status: SHIPPED | OUTPATIENT
Start: 2021-07-16 | End: 2022-06-15

## 2021-07-17 NOTE — PROGRESS NOTES
[facility]   [address] [facility] [address]  Phone#  [phone]  Fax#  [phone]    ASSESSMENT and PLAN  No problem-specific Assessment & Plan notes found for this encounter  1  Mouth sores  · Likely secondary to immune stress response, consider tongue biting secondary to tardive dyskinesia from anti-psychotics  · Physical exam significant for two canker sore on right side of mouth against the cheek  · Continue to follow with Psychiatry closely for tapering of medications  Seroquel dose reduced today per patient as she recently saw Psychiatry   · Will prescribe Viscous Lidocaine to be used as a swish and spit prn  · Continue to monitor  · Follow up with PCP closely    Diagnoses and all orders for this visit:    Mouth sores  -     Lidocaine Viscous HCl (XYLOCAINE) 2 % mucosal solution; Swish and spit 15 mL 4 (four) times a day as needed for mouth pain or discomfort        HISTORY OF PRESENT ILLNESS  Shreya Wheat is a 48 y o  female who presents with complaint of mouth sores  Per patient, her psychiatrist likely thinks its tardive dyskinesia leading to tongue biting resulting in these mouth sores  Her Seroquel was reduced today  She denies any fevers, chills, chest pain, shortness of breath, sick contacts  No history of HSV  Denies dysphagia, odynophagia, tooth pain  REVIEW OF SYSTEMS  Review of Systems   Constitutional: Negative for chills, fatigue and fever  HENT: Positive for mouth sores  Negative for sore throat  Respiratory: Negative for cough, chest tightness and shortness of breath  Cardiovascular: Negative for chest pain and leg swelling  Gastrointestinal: Negative for constipation, diarrhea, nausea and vomiting  Endocrine: Negative for polydipsia, polyphagia and polyuria  Genitourinary: Negative for dysuria  Musculoskeletal: Negative for arthralgias  Skin: Negative for rash     Neurological: Negative for dizziness, light-headedness and headaches  Psychiatric/Behavioral: Negative for agitation and behavioral problems  PAST MEDICAL HISTORY   Past Medical History:   Diagnosis Date    ADHD     Anemia of chronic disease     Anxiety     Asthma     Asthma     Borderline personality disorder ([place] [address] )     Cataplexy     Chronic abdominal pain     CKD (chronic kidney disease) stage 3, GFR 30-59 ml/min (HCC)     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[facility] )     Cushing syndrome ([facility] )     Diabetes mellitus ([place] [address] )     DVT (deep venous thrombosis) (HCC)     GERD (gastroesophageal reflux disease)     History of acute pancreatitis     felt secondary to Bactrim    History of transfusion     HTN (hypertension)     Hypertension     Liver disease     fatty liver    Microscopic polyangiitis (HCC)     Morbid obesity (HCC)     MPA (microscopic polyangiitis) (HCC)     Ovarian cyst     PTSD (post-traumatic stress disorder)     Renal disorder     Self-inflicted injury     self inflicted skin wounds    Wegener's granulomatosis with renal involvement ([place] [address] ) 2015     Past Surgical History:   Procedure Laterality Date    COLONOSCOPY      ESOPHAGOGASTRODUODENOSCOPY  09/11/2015    mild antral gastritis    GASTRIC STIMULATOR IMPLANT SURGERY  06/25/2020    AL COLONOSCOPY FLX DX W/COLLJ SPEC WHEN PFRMD N/A 12/14/2018    Procedure: COLONOSCOPY with polypectomy;  Surgeon: Christy Trejo MD;  [facility]; Service: Gastroenterology    AL ESOPHAGOGASTRODUODENOSCOPY TRANSORAL DIAGNOSTIC N/A 12/14/2018    Procedure: ESOPHAGOGASTRODUODENOSCOPY (EGD) with biopsy;  Surgeon: Christy Trejo MD;  [facility];   Service: Gastroenterology    RELEASE SCAR CONTRACTURE / GRAFT REPAIRS OF HAND Bilateral     UPPER GASTROINTESTINAL ENDOSCOPY  12/26/2019     Social History     Socioeconomic History    Marital status: Single     Spouse name: Not on file    Number of children: Not on file    Years of education: Not on file    Highest education level: Not on file   Occupational History    Occupation: disability   Tobacco Use    Smoking status: Former Smoker     Quit date: 2011     Years since quitting: 10 5    Smokeless tobacco: Never Used    Tobacco comment: marijuana   Vaping Use    Vaping Use: Never used   Substance and Sexual Activity    Alcohol use: Never    Drug use: Yes     Types: Marijuana     Comment: marijuana daily    Sexual activity: Not Currently     Partners: Female, Male     Birth control/protection: None   Other Topics Concern    Not on file   Social History Narrative    Social hx reviewed     No pref on Hinduism beliefs     Daily caffeine consumption 1 serving/day     Social Determinants of Health     Financial Resource Strain:     Difficulty of Paying Living Expenses:    Food Insecurity:     Worried About Running Out of Food in the Last Year:     Ran Out of Food in the Last Year:    Transportation Needs:     Lack of Transportation (Medical):      Lack of Transportation (Non-Medical):    Physical Activity:     Days of Exercise per Week:     Minutes of Exercise per Session:    Stress:     Feeling of Stress :    Social Connections:     Frequency of Communication with Friends and Family:     Frequency of Social Gatherings with Friends and Family:     Attends Druze Services:     Active Member of Clubs or Organizations:     Attends Club or Organization Meetings:     Marital Status:    Intimate Partner Violence:     Fear of Current or Ex-Partner:     Emotionally Abused:     Physically Abused:     Sexually Abused:      Family History   Problem Relation Age of Onset    No Known Problems Mother     No Known Problems Father     Colon cancer Neg Hx     Drug abuse Neg Hx         mother father    Mental illness Neg Hx         disorder, mother father    Cancer Neg Hx     Breast cancer Neg Hx        MEDICATIONS    Current Outpatient Medications:     albuterol (PROVENTIL HFA,VENTOLIN HFA) 90 mcg/act inhaler, INHALE 2 PUFFS BY MOUTH EVERY 6 HOURS AS NEEDED FOR WHEEZING OR SHORTNESS OF BREATH, Disp: 18 g, Rfl: 1    Alcohol Swabs (ALCOHOL PADS) 70 % PADS, by Does not apply route 4 (four) times a day, Disp: 400 each, Rfl: 3    amLODIPine (NORVASC) 5 mg tablet, TAKE ONE TABLET BY MOUTH ONCE DAILY, Disp: 90 tablet, Rfl: 2    amphetamine-dextroamphetamine (ADDERALL XR) 20 MG 24 hr capsule, Take 1 capsule (20 mg total) by mouth 2 (two) times a dayMax Daily Amount: 40 mg, Disp: 60 capsule, Rfl: 0    atorvastatin (LIPITOR) 20 mg tablet, Take 1 tablet (20 mg total) by mouth daily, Disp: 90 tablet, Rfl: 1    Blood Glucose Monitoring Suppl (FREESTYLE LITE) DEIRDRE, by Does not apply route daily, Disp: 1 each, Rfl: 0    buPROPion (WELLBUTRIN XL) 300 mg 24 hr tablet, Take 300 mg by mouth every morning , Disp: , Rfl:     clobetasol (TEMOVATE) 0 05 % cream, Apply topically daily Start by applying once a day in the evenings  After a week, can decrease to 4-5 times a week   After another week, can decrease to 3 times a week , Disp: 30 g, Rfl: 0    conjugated estrogens (PREMARIN) vaginal cream, Insert 1 g into the vagina 2 (two) times a week, Disp: 30 g, Rfl: 12    Dexilant 60 MG capsule, TAKE ONE CAPSULE BY MOUTH ONCE DAILY, Disp: 30 capsule, Rfl: 2    diphenoxylate-atropine (LOMOTIL) 2 5-0 025 mg per tablet, Take 1 tablet by mouth 4 (four) times a day as needed for diarrhea, Disp: 120 tablet, Rfl: 4     MG capsule, TAKE ONE CAPSULE BY MOUTH TWICE A DAY, Disp: 180 capsule, Rfl: 1    Easy Comfort Lancets MISC, USE TO TEST THE BLOOD SUGAR THREE TIMES A DAY, Disp: 300 each, Rfl: 10    FREESTYLE LITE test strip, USE TO TEST THE BLOOD SUGAR THREE TIMES A DAY, Disp: 100 each, Rfl: 5    hydrocortisone 1 % cream, APPLY TOPICALLY TO AFFECTED AREA FOUR TIMES A DAY AS NEEDED FOR IRRITATION, Disp: 56 g, Rfl: 0    insulin aspart (NovoLOG FlexPen) 100 UNIT/ML injection pen, Inject 6 Units under the skin 3 (three) times a day with meals, Disp: 15 mL, Rfl: 2    insulin glargine (Toujeo Max SoloStar) 300 units/mL CONCENTRATED U-300 injection pen (2-unit dial), Inject 22 Units under the skin daily, Disp: 3 pen, Rfl: 1    Insulin Pen Needle (PEN NEEDLES) 31G X 8 MM MISC, Inject 1 Stick under the skin 4 (four) times a day (with meals and at bedtime), Disp: 100 each, Rfl: 6    lidocaine (XYLOCAINE) 5 % ointment, APPLY TOPICALLY 2GM TWICE A DAY TO AFFECTED AREAS AS NEEDED FOR MILD PAIN, Disp: 250 g, Rfl: 8    linaCLOtide (Linzess) 72 MCG CAPS, Take 72 mcg by mouth daily, Disp: 90 capsule, Rfl: 3    Movantik 25 MG tablet, , Disp: , Rfl:     Nutritional Supplements (Ensure Original) LIQD, Take 1 Bottle by mouth 2 (two) times a day, Disp: 60 Bottle, Rfl: 11    ondansetron (ZOFRAN) 4 mg tablet, Take 1 tablet (4 mg total) by mouth every 8 (eight) hours as needed for nausea or vomiting, Disp: 30 tablet, Rfl: 2    oxyCODONE (ROXICODONE) 10 MG TABS, Take 1 tablet (10 mg total) by mouth every 6 (six) hours as needed for moderate painMax Daily Amount: 40 mg, Disp: 120 tablet, Rfl: 0    oxyCODONE-acetaminophen (PERCOCET)  mg per tablet, Take 1 tablet by mouth every 6 (six) hours as needed for moderate painMax Daily Amount: 4 tablets, Disp: 120 tablet, Rfl: 0    pantoprazole (PROTONIX) 40 mg tablet, Take 1 tablet (40 mg total) by mouth 2 (two) times a day before meals, Disp: 60 tablet, Rfl: 5    Probiotic Product (PROBIOTIC-10 PO), Take 1 tablet by mouth daily, Disp: , Rfl:     promethazine (PHENERGAN) 25 mg tablet, Take 1 tablet (25 mg total) by mouth every 6 (six) hours as needed for nausea or vomiting, Disp: 60 tablet, Rfl: 3    QUEtiapine (SEROquel) 200 mg tablet, Take 150 mg by mouth daily at bedtime , Disp: , Rfl:     scopolamine (TRANSDERM-SCOP) 1 5 mg/3 days TD 72 hr patch, Place 1 patch on the skin every third day, Disp: 10 patch, Rfl: 0    sucralfate (CARAFATE) 1 g/10 mL suspension, Take 10 mL (1 g total) by mouth 4 (four) times a day, Disp: 420 mL, Rfl: 0    TiZANidine (ZANAFLEX) 4 MG capsule, TAKE ONE CAPSULE BY MOUTH THREE TIMES A DAY, Disp: 90 capsule, Rfl: 0    cycloSPORINE (RESTASIS) 0 05 % ophthalmic emulsion, Administer 1 drop to both eyes 2 (two) times a day, Disp: , Rfl:     dicyclomine (BENTYL) 10 mg capsule, , Disp: , Rfl:     dicyclomine (BENTYL) 20 mg tablet, Take 1 tablet (20 mg total) by mouth every 6 (six) hours, Disp: 30 tablet, Rfl: 0    Lidocaine Viscous HCl (XYLOCAINE) 2 % mucosal solution, Swish and spit 15 mL 4 (four) times a day as needed for mouth pain or discomfort, Disp: 15 mL, Rfl: 1    loperamide (IMODIUM) 2 mg capsule, Take 1 capsule (2 mg total) by mouth 4 (four) times a day as needed for diarrhea (Patient not taking: Reported on 7/16/2021), Disp: 12 capsule, Rfl: 0    montelukast (SINGULAIR) 10 mg tablet, Take 1 tablet (10 mg total) by mouth daily at bedtime, Disp: 90 tablet, Rfl: 1    QUEtiapine (SEROquel) 300 mg tablet, , Disp: , Rfl:     PHYSICAL EXAM  Vitals:    07/16/21 1641   BP: 122/88   BP Location: Right arm   Patient Position: Sitting   Cuff Size: Large   Pulse: 83   Resp: 16   Temp: 98 °F (36 7 °C)   TempSrc: Temporal   SpO2: 97%   Weight: 67 6 kg (149 lb)   Height: 5' 2" (1 575 m)     Wt Readings from Last 3 Encounters:   07/16/21 67 6 kg (149 lb)   06/29/21 67 6 kg (149 lb)   06/24/21 70 8 kg (156 lb)    , Body mass index is 27 25 kg/m²  Physical Exam  Constitutional:       Appearance: She is well-developed  HENT:      Head: Normocephalic and atraumatic  Mouth/Throat:      Mouth: Mucous membranes are moist  Oral lesions (two, 1cm raised lesions indicative of cankersores) present  No lacerations or angioedema  Dentition: Abnormal dentition  Tongue: No lesions  Palate: No mass  Pharynx: Oropharynx is clear  Uvula midline  No pharyngeal swelling, oropharyngeal exudate, posterior oropharyngeal erythema or uvula swelling        Tonsils: No tonsillar exudate or tonsillar abscesses  Eyes:      Pupils: Pupils are equal, round, and reactive to light  Cardiovascular:      Rate and Rhythm: Normal rate and regular rhythm  Heart sounds: Normal heart sounds  Pulmonary:      Effort: Pulmonary effort is normal       Breath sounds: Normal breath sounds  Abdominal:      General: Bowel sounds are normal       Palpations: Abdomen is soft  Musculoskeletal:         General: Normal range of motion  Cervical back: Normal range of motion and neck supple  Skin:     General: Skin is warm  Findings: No erythema or rash  Neurological:      Mental Status: She is alert and oriented to person, place, and time     Psychiatric:         Behavior: Behavior normal        LABS/IMAGING  Hemoglobin A1C   Date Value Ref Range Status   02/15/2021 6 0 6 5 Final   07/26/2018 6 1  Final   12/22/2017 6 3 4 2 - 6 3 % Final   10/17/2015 6 9 (H) 4 0 - 5 6 % Final     Comment:     5 7-6 4% impaired fasting glucose  >=6 5% diagnosis of diabetes  Falsely low levels are seen in conditions linked to short RBC life span  - hemolytic anemia, and splenomegaly  Falsely elevated levels are seen in situations where there is an  increased production of RBC - receipt of erythropoietin or blood  transfusions  Adopted from ADA-Clinical Practice Recommendations       Cholesterol   Date Value Ref Range Status   06/06/2015 237 mg/dL Final     Comment:     CHOLESTEROL:       Desirable           <200 mg/dl       Borderline High     200-239 mg/dl       High                   >239 mg/dl  ____________________________________       HDL   Date Value Ref Range Status   06/06/2015 68 mg/dL Final     Comment:     HDL:       High       >59 mg/dl       Low        <41 mg/dl  ______________________________       HDL, Direct   Date Value Ref Range Status   02/17/2021 41 >=40 mg/dL Final     Comment:     HDL Cholesterol:       Low     <41 mg/dL  Specimen collection should occur prior to Metamizole administration due to the potential for falsley depressed results  Triglycerides   Date Value Ref Range Status   02/17/2021 552 (H) <=150 mg/dL Final     Comment:     Triglyceride:     Normal          <150 mg/dl     Borderline High 150-199 mg/dl     High            200-499 mg/dl        Very High       >499 mg/dl    Specimen collection should occur prior to N-Acetylcysteine or Metamizole administration due to the potential for falsely depressed results     06/06/2015 288 mg/dL Final     Comment:     TRIGLYCERIDE:       Normal                 <150 mg/dl       Borderline High       150-199 mg/dl       High                  200-499 mg/dl       Very High             >499 mg/dl  _______________________________________        WBC   Date Value Ref Range Status   06/06/2021 9 66 4 31 - 10 16 Thousand/uL Final   06/05/2021 12 54 (H) 4 31 - 10 16 Thousand/uL Final   05/09/2021 6 39 4 31 - 10 16 Thousand/uL Final   11/09/2017 5 8 3 8 - 10 8 Thousand/uL Final   08/11/2017 6 0 3 8 - 10 8 Thousand/uL Final   07/08/2016 4 3 3 8 - 10 8 Thousand/uL Final     White Blood Cell Count   Date Value Ref Range Status   06/28/2021 5 1 3 8 - 10 8 Thousand/uL Final     Hemoglobin   Date Value Ref Range Status   06/28/2021 13 3 11 7 - 15 5 g/dL Final   06/06/2021 14 3 11 5 - 15 4 g/dL Final   06/05/2021 14 2 11 5 - 15 4 g/dL Final   05/09/2021 12 7 11 5 - 15 4 g/dL Final   11/09/2017 12 5 11 7 - 15 5 g/dL Final   08/11/2017 12 8 11 7 - 15 5 g/dL Final   07/08/2016 11 3 (L) 11 7 - 15 5 g/dL Final     Platelet Count   Date Value Ref Range Status   06/28/2021 313 140 - 400 Thousand/uL Final     Platelets   Date Value Ref Range Status   06/06/2021 281 149 - 390 Thousands/uL Final   06/05/2021 300 149 - 390 Thousands/uL Final   05/09/2021 278 149 - 390 Thousands/uL Final   11/09/2017 277 140 - 400 Thousand/uL Final   08/11/2017 306 140 - 400 Thousand/uL Final   07/08/2016 307 140 - 400 Thousand/uL Final     Potassium   Date Value Ref Range Status   06/28/2021 4 7 3 5 - 5 3 mmol/L Final   06/06/2021 3 9 3 5 - 5 3 mmol/L Final   06/05/2021 3 8 3 5 - 5 3 mmol/L Final   05/09/2021 4 2 3 5 - 5 3 mmol/L Final   09/30/2020 4 8 3 5 - 5 3 mmol/L Final   04/15/2020 4 7 3 5 - 5 3 mmol/L Final     Chloride   Date Value Ref Range Status   06/28/2021 110 98 - 110 mmol/L Final   06/06/2021 105 100 - 108 mmol/L Final   06/05/2021 110 (H) 100 - 108 mmol/L Final   05/09/2021 107 100 - 108 mmol/L Final   09/30/2020 111 (H) 98 - 110 mmol/L Final   04/15/2020 108 98 - 110 mmol/L Final     CO2   Date Value Ref Range Status   06/28/2021 20 20 - 32 mmol/L Final   06/06/2021 22 21 - 32 mmol/L Final   06/05/2021 24 21 - 32 mmol/L Final   05/09/2021 24 21 - 32 mmol/L Final   09/30/2020 24 20 - 32 mmol/L Final   04/15/2020 28 20 - 32 mmol/L Final     Anion Gap   Date Value Ref Range Status   10/28/2015 10 4 - 13 mmol/L Final   10/19/2015 10 4 - 13 mmol/L Final   10/17/2015 11 4 - 13 mmol/L Final     BUN   Date Value Ref Range Status   06/28/2021 44 (H) 7 - 25 mg/dL Final   06/06/2021 26 (H) 5 - 25 mg/dL Final   06/05/2021 32 (H) 5 - 25 mg/dL Final   05/09/2021 31 (H) 5 - 25 mg/dL Final   09/30/2020 42 (H) 7 - 25 mg/dL Final   04/15/2020 29 (H) 7 - 25 mg/dL Final     Creatinine   Date Value Ref Range Status   06/28/2021 2 35 (H) 0 50 - 1 05 mg/dL Final     Comment:     For patients >52years of age, the reference limit  for Creatinine is approximately 13% higher for people  identified as -American         06/06/2021 1 54 (H) 0 60 - 1 30 mg/dL Final     Comment:     Standardized to IDMS reference method   06/05/2021 1 91 (H) 0 60 - 1 30 mg/dL Final     Comment:     Standardized to IDMS reference method   05/09/2021 2 32 (H) 0 60 - 1 30 mg/dL Final     Comment:     Standardized to IDMS reference method   11/09/2017 2 04 (H) 0 50 - 1 10 mg/dL Final   08/11/2017 1 81 (H) 0 50 - 1 10 mg/dL Final   06/09/2016 1 95 (H) 0 50 - 1 10 mg/dL Final     eGFR   Date Value Ref Range Status   06/06/2021 39 ml/min/1 73sq m Final   06/05/2021 30 ml/min/1 73sq m Final   05/09/2021 24 ml/min/1 73sq m Final   08/01/2016 25 5 ml/min/1 73sq m Final     Glucose   Date Value Ref Range Status   10/28/2015 207 (H) 65 - 140 mg/dL Final     Comment:     If patient is fasting, the ADA then defines impaired fasting glucose as  >100 mg/dl and diabetes as  >or equal to 126 mg/dl  10/19/2015 134 65 - 140 mg/dL Final     Comment:     If patient is fasting, the ADA then defines impaired fasting glucose as  >100 mg/dl and diabetes as  >or equal to 126 mg/dl  10/17/2015 159 (H) 65 - 140 mg/dL Final     Comment:     If patient is fasting, the ADA then defines impaired fasting glucose as  >100 mg/dl and diabetes as  >or equal to 126 mg/dl  Glucose, i-STAT   Date Value Ref Range Status   08/01/2016 103 65 - 140 mg/dl Final     Calcium   Date Value Ref Range Status   06/28/2021 9 8 8 6 - 10 4 mg/dL Final   06/06/2021 9 6 8 3 - 10 1 mg/dL Final   06/05/2021 9 2 8 3 - 10 1 mg/dL Final   05/09/2021 9 0 8 3 - 10 1 mg/dL Final   09/30/2020 8 7 8 6 - 10 2 mg/dL Final   04/15/2020 9 2 8 6 - 10 2 mg/dL Final   04/15/2020 9 2 8 6 - 10 2 mg/dL Final     AST   Date Value Ref Range Status   06/28/2021 14 10 - 35 U/L Final   06/05/2021 18 5 - 45 U/L Final     Comment:     Specimen collection should occur prior to Sulfasalazine administration due to the potential for falsely depressed results  05/09/2021 21 5 - 45 U/L Final     Comment:     Specimen collection should occur prior to Sulfasalazine administration due to the potential for falsely depressed results  05/05/2021 70 (H) 5 - 45 U/L Final     Comment:     Specimen collection should occur prior to Sulfasalazine administration due to the potential for falsely depressed results      09/30/2020 13 10 - 35 U/L Final   05/28/2019 20 10 - 35 U/L Final     ALT   Date Value Ref Range Status   06/28/2021 13 6 - 29 U/L Final   06/05/2021 21 12 - 78 U/L Final     Comment:     Specimen collection should occur prior to Sulfasalazine administration due to the potential for falsely depressed results  05/09/2021 25 12 - 78 U/L Final     Comment:     Specimen collection should occur prior to Sulfasalazine administration due to the potential for falsely depressed results  05/05/2021 22 12 - 78 U/L Final     Comment:     Specimen collection should occur prior to Sulfasalazine administration due to the potential for falsely depressed results      09/30/2020 10 6 - 29 U/L Final   05/28/2019 20 6 - 29 U/L Final     Alkaline Phosphatase   Date Value Ref Range Status   06/28/2021 147 37 - 153 U/L Final   06/05/2021 171 (H) 46 - 116 U/L Final   05/09/2021 129 (H) 46 - 116 U/L Final   05/05/2021 134 (H) 46 - 116 U/L Final   09/30/2020 127 (H) 31 - 125 U/L Final   05/28/2019 116 (H) 33 - 115 U/L Final     Total Protein   Date Value Ref Range Status   10/28/2015 5 8 (L) 6 4 - 8 2 g/dL Final   10/19/2015 5 5 (L) 6 4 - 8 2 g/dL Final   10/16/2015 6 6 6 4 - 8 2 g/dL Final     Total Bilirubin   Date Value Ref Range Status   10/28/2015 0 14 (L) 0 20 - 1 00 mg/dL Final   10/19/2015 <0 10 (L) 0 20 - 1 00 mg/dL Final   10/16/2015 0 20 0 20 - 1 00 mg/dL Final     Magnesium   Date Value Ref Range Status   06/06/2021 2 0 1 6 - 2 6 mg/dL Final   01/07/2018 2 1 1 6 - 2 6 mg/dL Final   07/08/2017 1 9 1 6 - 2 6 mg/dL Final   10/19/2015 1 8 1 6 - 2 6 mg/dL Final     Comment:     The above 1 analytes were performed by [facility]  [address] [place], [place] [id number]     10/16/2015 2 0 1 6 - 2 6 mg/dL Final     Comment:     The above 1 analytes were performed by [facility]  [address] [place], [place] [address] [id number]     09/27/2015 1 9 1 6 - 2 6 mg/dL Final     Comment:     The above 1 analytes were performed by [facility]  [address] [place] [address]       Magnesium, Serum   Date Value Ref Range Status   05/28/2019 2 1 1 5 - 2 5 mg/dL Final     No results found for: TSH      Preston Gutierres MD   PGY-2

## 2021-07-20 ENCOUNTER — TELEPHONE (OUTPATIENT)
Dept: FAMILY MEDICINE CLINIC | Facility: CLINIC | Age: 51
End: 2021-07-20

## 2021-07-20 DIAGNOSIS — N95.2 VAGINAL ATROPHY: ICD-10-CM

## 2021-07-21 DIAGNOSIS — F31.9 BIPOLAR 1 DISORDER (HCC): ICD-10-CM

## 2021-07-21 RX ORDER — DEXTROAMPHETAMINE SACCHARATE, AMPHETAMINE ASPARTATE MONOHYDRATE, DEXTROAMPHETAMINE SULFATE AND AMPHETAMINE SULFATE 5; 5; 5; 5 MG/1; MG/1; MG/1; MG/1
20 CAPSULE, EXTENDED RELEASE ORAL 2 TIMES DAILY
Qty: 60 CAPSULE | Refills: 0 | Status: SHIPPED | OUTPATIENT
Start: 2021-07-21 | End: 2021-08-26 | Stop reason: SDUPTHER

## 2021-07-22 ENCOUNTER — TELEPHONE (OUTPATIENT)
Dept: FAMILY MEDICINE CLINIC | Facility: CLINIC | Age: 51
End: 2021-07-22

## 2021-07-22 NOTE — TELEPHONE ENCOUNTER
SIGNATURES NEEDED FOR HOME HEALTH CERTIFICTION FORM RECEIVED VIA FAX  WILL BE PLACED IN FORM BIN FOR MA PICKUP

## 2021-07-23 ENCOUNTER — OFFICE VISIT (OUTPATIENT)
Dept: FAMILY MEDICINE CLINIC | Facility: CLINIC | Age: 51
End: 2021-07-23

## 2021-07-23 VITALS
RESPIRATION RATE: 18 BRPM | DIASTOLIC BLOOD PRESSURE: 84 MMHG | TEMPERATURE: 97 F | SYSTOLIC BLOOD PRESSURE: 108 MMHG | HEART RATE: 84 BPM | OXYGEN SATURATION: 95 %

## 2021-07-23 DIAGNOSIS — E11.22 TYPE 2 DIABETES MELLITUS WITH STAGE 4 CHRONIC KIDNEY DISEASE, UNSPECIFIED WHETHER LONG TERM INSULIN USE (HCC): ICD-10-CM

## 2021-07-23 DIAGNOSIS — N18.4 TYPE 2 DIABETES MELLITUS WITH STAGE 4 CHRONIC KIDNEY DISEASE, UNSPECIFIED WHETHER LONG TERM INSULIN USE (HCC): ICD-10-CM

## 2021-07-23 DIAGNOSIS — K13.79 MOUTH SORES: Primary | ICD-10-CM

## 2021-07-23 PROCEDURE — 3079F DIAST BP 80-89 MM HG: CPT | Performed by: PHYSICIAN ASSISTANT

## 2021-07-23 PROCEDURE — 3074F SYST BP LT 130 MM HG: CPT | Performed by: PHYSICIAN ASSISTANT

## 2021-07-23 PROCEDURE — 3066F NEPHROPATHY DOC TX: CPT | Performed by: PHYSICIAN ASSISTANT

## 2021-07-23 PROCEDURE — 1036F TOBACCO NON-USER: CPT | Performed by: PHYSICIAN ASSISTANT

## 2021-07-23 PROCEDURE — 99213 OFFICE O/P EST LOW 20 MIN: CPT | Performed by: PHYSICIAN ASSISTANT

## 2021-07-23 RX ORDER — BLOOD-GLUCOSE METER
KIT MISCELLANEOUS DAILY
Qty: 1 EACH | Refills: 0 | Status: SHIPPED | OUTPATIENT
Start: 2021-07-23

## 2021-07-23 NOTE — LETTER
July 23, 2021     Patient: Cassidy Romero   YOB: 1970   Date of Visit: 7/23/2021       To Whom it May Concern:    Cassidy Romero is under my professional care  She was seen in my office on 7/23/2021  Patient has known past medical history of gastroparesis, GERD, constipation, ADHD, type 2 diabetes mellitus, cataplexy, Wegener's granulomatosis, small fiber neuropathy, bipolar 1 disorder  Patient is wheel chair bound  Due to patient's medical conditions, patient requires to have an additional 4 hours each day of the week (Sunday through Saturday) of home health care  Patient has been to the hospital multiple times over the past few months and could possibly have been avoided if patient had home health care aide present  Patient cannot perform any activities of daily living on her own including dressing, bathing, cooking  Patient requires assistance to transfer in and out of her bed and chairs  Patient is also now experiencing worsening memory loss which is impairing patient's daily living            If you have any questions or concerns, please don't hesitate to call           Sincerely,          Katarzyna Thrasher PA-C        CC: No Recipients

## 2021-07-23 NOTE — PROGRESS NOTES
Assessment/Plan:    Mouth sores  - Likely due to tongue biting secondary to tardive dyskinesia from antipsychotics  - Seroquel dose was recently decreased by Psychiatry, but patient has yet to see any improvement with her symptoms     - Continue close follow-up with Psychiatry for tapering of medications  - Viscous lidocaine has not been very effective  Will discontinue  Will prescribe magic mouthwash, to use as swish and spit as needed  Diagnoses and all orders for this visit:    Mouth sores  -     al mag oxide-diphenhydramine-lidocaine viscous (MAGIC MOUTHWASH) 1:1:1 suspension; Swish and spit 10 mL every 4 (four) hours as needed for mouth pain or discomfort    Type 2 diabetes mellitus with stage 4 chronic kidney disease, unspecified whether long term insulin use (HCC)  -     Blood Glucose Monitoring Suppl (FreeStyle Lite) DEIRDRE; Use daily          All of patients questions were answered  Patient understands and agrees with the above plan  Return for Next scheduled follow up as scheduled on 9/29/21  Celena Vu PA-C  07/23/21  [address] [facility] [place]          Subjective:     Patient ID: Shanique Drake  is a 48 y o  female  has a past medical history of ADHD, Anemia of chronic disease, Anxiety, Asthma, Asthma, Borderline personality disorder ([address] ), Cataplexy, Chronic abdominal pain, CKD (chronic kidney disease) stage 3, GFR 30-59 ml/min ([address] ), 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[address] ), Cushing syndrome ([address] ), Diabetes mellitus ([address] ), DVT (deep venous thrombosis) ([address] ), GERD (gastroesophageal reflux disease), History of acute pancreatitis, History of transfusion, HTN (hypertension), Hypertension, Liver disease, Microscopic polyangiitis ([address] ), Morbid obesity ([address] ), MPA (microscopic polyangiitis) ([address] ), Ovarian cyst, PTSD (post-traumatic stress disorder), Renal disorder, Self-inflicted injury, and Wegener's granulomatosis with renal involvement ([address] )  She also has no past medical history of Hyperlipidemia   who presents today in office for mouth sores follow up  - Patient is a 48 y o  female who presents today for mouth sores follow up  Patient notes she has been using the viscous lidocaine but it is really discussing to taste and it does not work that well  Patient notes previously she was given a mouthwash rinse that worked better  Patient notes the sores have been getting a little better  Patient notes she did see her psychiatrist and Seroquel was decreased to 100 mg  Patient notes in the past she was taking Thorazine which caused her to have abnormal mouth movements  - Also, patient notes she is now experiencing worsening memory loss  Patient notes she came to the office the other day because she was confident the office visit was on that day instead of today  Patient this is another reason why she would benefit from having her home health aide another 4 hours every day  Also, patient notes her glucometer recently broke and she needs a new 1  The following portions of the patient's history were reviewed and updated as appropriate: allergies, current medications, past family history, past medical history, past social history, past surgical history and problem list         Review of Systems   Constitutional: Negative for chills, fatigue and fever  HENT: Positive for mouth sores  Negative for sore throat  Tongue biting/abnormal mouth movements   Respiratory: Negative for cough, chest tightness and shortness of breath  Cardiovascular: Negative for chest pain and leg swelling  Gastrointestinal: Negative for constipation, diarrhea, nausea and vomiting  Genitourinary: Negative for dysuria  Musculoskeletal: Negative for arthralgias  Skin: Negative for rash  Neurological: Negative for dizziness, light-headedness and headaches  Psychiatric/Behavioral: Negative for behavioral problems                   Objective:   Vitals:    07/23/21 1258   BP: 108/84   BP Location: Right arm   Patient Position: Sitting Cuff Size: Standard   Pulse: 84   Resp: 18   Temp: (!) 97 °F (36 1 °C)   TempSrc: Temporal   SpO2: 95%         Physical Exam  Vitals and nursing note reviewed  Constitutional:       Appearance: She is well-developed  Comments: Examined in [facility] scooter  HENT:      Head: Normocephalic and atraumatic  Right Ear: External ear normal       Left Ear: External ear normal       Nose: Nose normal       Mouth/Throat:      Mouth: Oral lesions present  Eyes:      Conjunctiva/sclera: Conjunctivae normal    Cardiovascular:      Rate and Rhythm: Normal rate and regular rhythm  Heart sounds: Normal heart sounds  Pulmonary:      Effort: Pulmonary effort is normal       Breath sounds: Normal breath sounds  No wheezing  Musculoskeletal:      Cervical back: Normal range of motion and neck supple  Skin:     General: Skin is warm and dry  Neurological:      Mental Status: She is alert and oriented to person, place, and time  Motor: Abnormal muscle tone present  Comments: Abnormal mouth movements present when patient speaking

## 2021-07-24 ENCOUNTER — NURSE TRIAGE (OUTPATIENT)
Dept: OTHER | Facility: OTHER | Age: 51
End: 2021-07-24

## 2021-07-24 NOTE — TELEPHONE ENCOUNTER
Reason for Disposition   [1] Caller has URGENT medicine question about med that PCP or specialist prescribed AND [2] triager unable to answer question    Answer Assessment - Initial Assessment Questions  1  NAME of MEDICATION: "What medicine are you calling about?"     Adderall  2  QUESTION: "What is your question?" (e g , medication refill, side effect)      Pharmacy will not release it to her- without prior authorization  3  PRESCRIBING HCP: "Who prescribed it?" Reason: if prescribed by specialist, call should be referred to that group  Katarzyna Thrasher PA-C  4  SYMPTOMS: "Do you have any symptoms?"      Due today for med - starting to get "worked up "  5  SEVERITY: If symptoms are present, ask "Are they mild, moderate or severe?"      Mild right now but she will feel worse by tomorrow    6  PREGNANCY:  "Is there any chance that you are pregnant?" "When was your last menstrual period?"      NA    Protocols used: MEDICATION QUESTION CALL-ADULT-

## 2021-07-24 NOTE — TELEPHONE ENCOUNTER
Regarding: Medication Issue  ----- Message from Rama Medrano sent at 7/24/2021  9:21 AM EDT -----  Note to Pharmacy: Continuation of therapy  Do not refill until 7/24/21  amphetamine-dextroamphetamine (ADDERALL XR) 20 MG 24 hr capsule I am calling since the pharmacy will not fill this, it needs a prior authorization, it is a scheduled medication, they do this to me all the time "

## 2021-07-24 NOTE — TELEPHONE ENCOUNTER
Dr He Mojica attempted to call patient's insurance at [phone] after I spoke to pharmacist to get this phone number  TT from Dr Mikie Hemphill states that there is no one to speak to there at this time  This must be done during normal business hours  I called this patient back and explained to her that we tried and she was calmer than earlier and stated that she understood  She is going to go to the pharmacy now to see if she could buy two pills to get her through until Monday

## 2021-07-24 NOTE — TELEPHONE ENCOUNTER
I TT Dr Idalia Tristan the following "Not sure if you can help this patient but told her that I would try -  She is calling because [phone] [place] [facility] [place] her Adderall that Guerita Leos PA-C wrote for on 7/21/2021 without a prior authorization  Yesterday was her last pill and today it was to be released to her   Are you able to do help with this?"

## 2021-07-26 ENCOUNTER — TELEPHONE (OUTPATIENT)
Dept: FAMILY MEDICINE CLINIC | Facility: CLINIC | Age: 51
End: 2021-07-26

## 2021-07-26 NOTE — TELEPHONE ENCOUNTER
I can only assume PA , though, I can't verify as there are not approvals on file  PA completed via covermymeds at this time  Can take up to 5 business days for a response

## 2021-07-26 NOTE — TELEPHONE ENCOUNTER
amphetamine-dextroamphetamine (ADDERALL XR) 20 MG 24 hr capsule    PA NEEDED- form placed on your desk

## 2021-07-26 NOTE — TELEPHONE ENCOUNTER
The pharmacist from  [facility] left a voicemail regarding prior authorization for the medication aderral 20 mg  Rin from [place] left a voicemail  Regarding the medication aderral 20mg if tablets or capsules

## 2021-07-27 ENCOUNTER — TELEPHONE (OUTPATIENT)
Dept: FAMILY MEDICINE CLINIC | Facility: CLINIC | Age: 51
End: 2021-07-27

## 2021-07-27 NOTE — TELEPHONE ENCOUNTER
Pt left a voicemail on the nurse line regarding the prior authorization for aderral, pt states the Dr Johnson Givens the wrong prescription

## 2021-07-27 NOTE — TELEPHONE ENCOUNTER
PT Compufirst to find out about the APproval of the Following Medication sent by PCP  (amphetamine-dextroamphetamine (ADDERALL XR) 20 MG 24 hr capsule    PT said that the Medication was not approve because the wrong prescription was sent  PT stated that the proscription said Tablet, and it needs to say Capsule  Please Advice    Thank You

## 2021-07-27 NOTE — TELEPHONE ENCOUNTER
I called pt and informed her the PA has been sent yesterday and we a recurrently awaiting the approval

## 2021-07-28 ENCOUNTER — TELEPHONE (OUTPATIENT)
Dept: FAMILY MEDICINE CLINIC | Facility: CLINIC | Age: 51
End: 2021-07-28

## 2021-07-28 NOTE — TELEPHONE ENCOUNTER
Good to know I need to check to make sure the pharmacy starts the PA with the correct form  Verified they chose the wrong form  Printed the completed PA, crossed out tablets, put capsules and re-faxed  Hopefully that fixes it  Told pt to check back with pharmacy later today to see if it goes through

## 2021-07-28 NOTE — TELEPHONE ENCOUNTER
Form dropped off by patient 07/28/21   Placed in the "forms" bin    Received fax    [employer] [facility]

## 2021-07-29 ENCOUNTER — TELEPHONE (OUTPATIENT)
Dept: FAMILY MEDICINE CLINIC | Facility: CLINIC | Age: 51
End: 2021-07-29

## 2021-08-05 DIAGNOSIS — M31.31 GRANULOMATOSIS WITH POLYANGIITIS WITH RENAL INVOLVEMENT (HCC): Chronic | ICD-10-CM

## 2021-08-05 DIAGNOSIS — M54.41 CHRONIC LOW BACK PAIN WITH BILATERAL SCIATICA, UNSPECIFIED BACK PAIN LATERALITY: ICD-10-CM

## 2021-08-05 DIAGNOSIS — G89.4 CHRONIC PAIN SYNDROME: ICD-10-CM

## 2021-08-05 DIAGNOSIS — G89.29 CHRONIC LOW BACK PAIN WITH BILATERAL SCIATICA, UNSPECIFIED BACK PAIN LATERALITY: ICD-10-CM

## 2021-08-05 DIAGNOSIS — M54.42 CHRONIC LOW BACK PAIN WITH BILATERAL SCIATICA, UNSPECIFIED BACK PAIN LATERALITY: ICD-10-CM

## 2021-08-06 ENCOUNTER — VBI (OUTPATIENT)
Dept: ADMINISTRATIVE | Facility: OTHER | Age: 51
End: 2021-08-06

## 2021-08-06 RX ORDER — TIZANIDINE HYDROCHLORIDE 4 MG/1
4 CAPSULE, GELATIN COATED ORAL 3 TIMES DAILY
Qty: 90 CAPSULE | Refills: 0 | Status: SHIPPED | OUTPATIENT
Start: 2021-08-06 | End: 2021-09-09

## 2021-08-06 RX ORDER — OXYCODONE HYDROCHLORIDE 10 MG/1
10 TABLET ORAL EVERY 6 HOURS PRN
Qty: 120 TABLET | Refills: 0 | Status: SHIPPED | OUTPATIENT
Start: 2021-08-06 | End: 2021-09-07 | Stop reason: SDUPTHER

## 2021-08-10 DIAGNOSIS — E78.2 MIXED HYPERLIPIDEMIA: ICD-10-CM

## 2021-08-10 DIAGNOSIS — R21 RASH: ICD-10-CM

## 2021-08-11 RX ORDER — ATORVASTATIN CALCIUM 20 MG/1
TABLET, FILM COATED ORAL
Qty: 90 TABLET | Refills: 0 | Status: SHIPPED | OUTPATIENT
Start: 2021-08-11 | End: 2021-11-09

## 2021-08-11 RX ORDER — DIAPER,BRIEF,INFANT-TODD,DISP
EACH MISCELLANEOUS
Qty: 56 G | Refills: 0 | Status: SHIPPED | OUTPATIENT
Start: 2021-08-11 | End: 2022-01-18 | Stop reason: ALTCHOICE

## 2021-08-16 ENCOUNTER — TELEPHONE (OUTPATIENT)
Dept: FAMILY MEDICINE CLINIC | Facility: CLINIC | Age: 51
End: 2021-08-16

## 2021-08-16 RX ORDER — CLOBETASOL PROPIONATE 0.5 MG/G
CREAM TOPICAL
Qty: 30 G | Refills: 0 | Status: SHIPPED | OUTPATIENT
Start: 2021-08-16 | End: 2022-01-26 | Stop reason: ALTCHOICE

## 2021-08-26 DIAGNOSIS — F31.9 BIPOLAR 1 DISORDER (HCC): ICD-10-CM

## 2021-08-27 RX ORDER — DEXTROAMPHETAMINE SACCHARATE, AMPHETAMINE ASPARTATE MONOHYDRATE, DEXTROAMPHETAMINE SULFATE AND AMPHETAMINE SULFATE 5; 5; 5; 5 MG/1; MG/1; MG/1; MG/1
20 CAPSULE, EXTENDED RELEASE ORAL 2 TIMES DAILY
Qty: 60 CAPSULE | Refills: 0 | Status: SHIPPED | OUTPATIENT
Start: 2021-08-27 | End: 2021-09-28 | Stop reason: SDUPTHER

## 2021-09-02 ENCOUNTER — TELEPHONE (OUTPATIENT)
Dept: NEPHROLOGY | Facility: CLINIC | Age: 51
End: 2021-09-02

## 2021-09-02 NOTE — TELEPHONE ENCOUNTER
A message has been left reminding pt of their upcoming appointment with Dr Eden Zimmer as well as the lab work that is needed for this visit

## 2021-09-07 ENCOUNTER — TELEPHONE (OUTPATIENT)
Dept: NEPHROLOGY | Facility: CLINIC | Age: 51
End: 2021-09-07

## 2021-09-07 ENCOUNTER — TELEMEDICINE (OUTPATIENT)
Dept: NEPHROLOGY | Facility: CLINIC | Age: 51
End: 2021-09-07
Payer: COMMERCIAL

## 2021-09-07 ENCOUNTER — TELEPHONE (OUTPATIENT)
Dept: FAMILY MEDICINE CLINIC | Facility: CLINIC | Age: 51
End: 2021-09-07

## 2021-09-07 VITALS — SYSTOLIC BLOOD PRESSURE: 108 MMHG | DIASTOLIC BLOOD PRESSURE: 66 MMHG

## 2021-09-07 DIAGNOSIS — G89.4 CHRONIC PAIN SYNDROME: ICD-10-CM

## 2021-09-07 DIAGNOSIS — M31.31 GRANULOMATOSIS WITH POLYANGIITIS WITH RENAL INVOLVEMENT (HCC): Chronic | ICD-10-CM

## 2021-09-07 DIAGNOSIS — N18.4 BENIGN HYPERTENSION WITH CKD (CHRONIC KIDNEY DISEASE) STAGE IV (HCC): Chronic | ICD-10-CM

## 2021-09-07 DIAGNOSIS — I12.9 BENIGN HYPERTENSION WITH CKD (CHRONIC KIDNEY DISEASE) STAGE IV (HCC): Chronic | ICD-10-CM

## 2021-09-07 DIAGNOSIS — K31.84 GASTROPARESIS: ICD-10-CM

## 2021-09-07 DIAGNOSIS — N17.9 ACUTE RENAL FAILURE SUPERIMPOSED ON STAGE 3B CHRONIC KIDNEY DISEASE, UNSPECIFIED ACUTE RENAL FAILURE TYPE (HCC): Primary | ICD-10-CM

## 2021-09-07 DIAGNOSIS — N18.32 ACUTE RENAL FAILURE SUPERIMPOSED ON STAGE 3B CHRONIC KIDNEY DISEASE, UNSPECIFIED ACUTE RENAL FAILURE TYPE (HCC): Primary | ICD-10-CM

## 2021-09-07 DIAGNOSIS — D63.8 ANEMIA OF CHRONIC DISEASE: ICD-10-CM

## 2021-09-07 DIAGNOSIS — R80.1 PERSISTENT PROTEINURIA: ICD-10-CM

## 2021-09-07 DIAGNOSIS — M31.7 MPA (MICROSCOPIC POLYANGIITIS) (HCC): Chronic | ICD-10-CM

## 2021-09-07 DIAGNOSIS — N18.30 CKD (CHRONIC KIDNEY DISEASE) STAGE 3, GFR 30-59 ML/MIN (HCC): ICD-10-CM

## 2021-09-07 PROCEDURE — 3066F NEPHROPATHY DOC TX: CPT | Performed by: PHYSICIAN ASSISTANT

## 2021-09-07 PROCEDURE — 99214 OFFICE O/P EST MOD 30 MIN: CPT | Performed by: INTERNAL MEDICINE

## 2021-09-07 PROCEDURE — 3066F NEPHROPATHY DOC TX: CPT | Performed by: INTERNAL MEDICINE

## 2021-09-07 PROCEDURE — 1036F TOBACCO NON-USER: CPT | Performed by: INTERNAL MEDICINE

## 2021-09-07 RX ORDER — OXYCODONE HYDROCHLORIDE 10 MG/1
10 TABLET ORAL EVERY 6 HOURS PRN
Qty: 120 TABLET | Refills: 0 | Status: SHIPPED | OUTPATIENT
Start: 2021-09-07 | End: 2021-10-05 | Stop reason: SDUPTHER

## 2021-09-07 RX ORDER — AMLODIPINE BESYLATE 5 MG/1
5 TABLET ORAL DAILY
Qty: 90 TABLET | Refills: 2 | Status: SHIPPED | OUTPATIENT
Start: 2021-09-07 | End: 2022-01-26 | Stop reason: ALTCHOICE

## 2021-09-07 RX ORDER — OXYCODONE AND ACETAMINOPHEN 10; 325 MG/1; MG/1
1 TABLET ORAL EVERY 6 HOURS PRN
Qty: 120 TABLET | Refills: 0 | Status: SHIPPED | OUTPATIENT
Start: 2021-09-07 | End: 2021-09-07 | Stop reason: ALTCHOICE

## 2021-09-07 NOTE — LETTER
September 7, 2021     Lisa Bae PA-C  [address]  [address] [employer]    Patient: Naseem Mae   YOB: 1970   Date of Visit: 9/7/2021       Dear Dr Minerva Moralez: Thank you for referring Naseem Mae to me for evaluation  Below are the relevant portions of my assessment and plan of care  If you have questions, please do not hesitate to call me  I look forward to following Fior Peña along with you  Sincerely,        Kulwinder Fernandez,         CC: No Recipients                       Assessment/Plan:    1  Recurrent MARY on chronic kidney disease, most likely secondary to volume depletion given history of gastroparesis  Most recent creatinine 2 35 previous baseline creatinine mid 1s  2  Microscopic poly angiitis, currently remission, currently off immunosuppressive medications since 2015  Previously on combination plasmapheresis, steroids as well as Cytoxan  Was briefly also on Imuran  3   Anemia of chronic kidney disease, will continue monitor closely   4  Gastroparesis, recommend following up with [facility] given recurrent symptoms in hospitalizations   5  Proteinuria, overall stable   6  Noted neuropathy, following with Neurology previously     given recurrent acute kidney injury, recommend repeating laboratory studies now, depending on the results  Further recommendations will follow   Repeat ANCA profile given history microscopic poly angiitis   No other changes in current regimen, blood pressure appears stable   further recommendations will be based upon repeat laboratory studies

## 2021-09-07 NOTE — TELEPHONE ENCOUNTER
Noted  This is the first request I am receiving about medication refill  Medication has been refilled  Thanks!

## 2021-09-07 NOTE — TELEPHONE ENCOUNTER
Pt called very upset states she left a message on the refill line last week to make sure she would be able to get her medication on time, pt states she went to pharmacy and they dont even have the script on file and now she has not one pill to even take today  medication refill   oxyCODONE-acetaminophen (PERCOCET)  mg per tablet

## 2021-09-07 NOTE — PROGRESS NOTES
Virtual Regular Visit    Verification of patient location:    Patient is located in the following state in which I hold an active license [place]      Assessment/Plan:    1  Recurrent MARY on chronic kidney disease, most likely secondary to volume depletion given history of gastroparesis  Most recent creatinine 2 35 previous baseline creatinine mid 1s  2  Microscopic poly angiitis, currently remission, currently off immunosuppressive medications since 2015  Previously on combination plasmapheresis, steroids as well as Cytoxan  Was briefly also on Imuran  3   Anemia of chronic kidney disease, will continue monitor closely   4  Gastroparesis, recommend following up with [facility] given recurrent symptoms in hospitalizations   5  Proteinuria, overall stable   6  Noted neuropathy, following with Neurology previously     given recurrent acute kidney injury, recommend repeating laboratory studies now, depending on the results  Further recommendations will follow   Repeat ANCA profile given history microscopic poly angiitis   No other changes in current regimen, blood pressure appears stable   further recommendations will be based upon repeat laboratory studies      Problem List Items Addressed This Visit        Digestive    Gastroparesis       Cardiovascular and Mediastinum    MPA (microscopic polyangiitis) (HCC) (Chronic)    Benign hypertension with CKD (chronic kidney disease) stage IV (HCC) (Chronic)       Genitourinary    Acute renal failure superimposed on stage 3 chronic kidney disease (HCC) - Primary       Other    Anemia of chronic disease    Persistent proteinuria               Reason for visit is   Chief Complaint   Patient presents with    Virtual Regular Visit        Encounter provider Viry Stewart DO    Provider located at [address] [address] [address] [facility]  [phone]      Recent Visits  Date Type Provider Dept   09/02/21 Telephone Lucina Gonzales MA [facility] [place]   Showing recent visits within past 7 days and meeting all other requirements  Today's Visits  Date Type Provider Dept   09/07/21 Telephone HOLLIS Louis   09/07/21 [address] [place] [address] [facility] [place]   Showing today's visits and meeting all other requirements  Future Appointments  No visits were found meeting these conditions  Showing future appointments within next 150 days and meeting all other requirements       The patient was identified by name and date of birth  Chelo Gandara was informed that this is a telemedicine visit and that the visit is being conducted through [address] [place] [address] [facility] and patient was informed that this is a secure, HIPAA-compliant platform  She agrees to proceed     My office door was closed  No one else was in the room  She acknowledged consent and understanding of privacy and security of the video platform  The patient has agreed to participate and understands they can discontinue the visit at any time  Patient is aware this is a billable service  Subjective  Chelo Gandara is a 48 y o  female  with known history of chronic kidney disease   Unfortunately she reports a number of hospitalizations /ER visits over last year  Most of these were secondary to recurrent gastroparesis abdominal pain as well as dehydration  Last post hospitalization creatinine was approximately 1 6  Tries to drink a fair amount of water however with gastroparesis and abdominal pain, fluid intake becomes markedly limited  Currently she states she has lost 6 lb  Has been eating very little  No reports of dizziness lightheadedness  Complains of lower extremity neuropathy        HPI     Past Medical History:   Diagnosis Date    ADHD     Anemia of chronic disease     Anxiety     Asthma     Asthma     Borderline personality disorder ([facility] )     Cataplexy     Chronic abdominal pain     CKD (chronic kidney disease) stage 3, GFR 30-59 ml/min (HCC)     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[facility] )     Cushing syndrome ([facility] )     Diabetes mellitus ([place] [address] )     DVT (deep venous thrombosis) (HCC)     GERD (gastroesophageal reflux disease)     History of acute pancreatitis     felt secondary to Bactrim    History of transfusion     HTN (hypertension)     Hypertension     Liver disease     fatty liver    Microscopic polyangiitis (HCC)     Morbid obesity (HCC)     MPA (microscopic polyangiitis) (HCC)     Ovarian cyst     PTSD (post-traumatic stress disorder)     Renal disorder     Self-inflicted injury     self inflicted skin wounds    Wegener's granulomatosis with renal involvement ([place] [address] ) 2015       Past Surgical History:   Procedure Laterality Date    COLONOSCOPY      ESOPHAGOGASTRODUODENOSCOPY  09/11/2015    mild antral gastritis    GASTRIC STIMULATOR IMPLANT SURGERY  06/25/2020    MO COLONOSCOPY FLX DX W/COLLJ SPEC WHEN PFRMD N/A 12/14/2018    Procedure: COLONOSCOPY with polypectomy;  Surgeon: Gregorio Soliman MD;  Location: [facility]; Service: Gastroenterology    MO ESOPHAGOGASTRODUODENOSCOPY TRANSORAL DIAGNOSTIC N/A 12/14/2018    Procedure: ESOPHAGOGASTRODUODENOSCOPY (EGD) with biopsy;  Surgeon: Gregorio Soliman MD;  Location: [facility];   Service: Gastroenterology    RELEASE SCAR CONTRACTURE / GRAFT REPAIRS OF HAND Bilateral     UPPER GASTROINTESTINAL ENDOSCOPY  12/26/2019       Current Outpatient Medications   Medication Sig Dispense Refill    al mag oxide-diphenhydramine-lidocaine viscous (MAGIC MOUTHWASH) 1:1:1 suspension Swish and spit 10 mL every 4 (four) hours as needed for mouth pain or discomfort 90 mL 2    albuterol (PROVENTIL HFA,VENTOLIN HFA) 90 mcg/act inhaler INHALE 2 PUFFS BY MOUTH EVERY 6 HOURS AS NEEDED FOR WHEEZING OR SHORTNESS OF BREATH 18 g 1    Alcohol Swabs (ALCOHOL PADS) 70 % PADS by Does not apply route 4 (four) times a day 400 each 3    amLODIPine (NORVASC) 5 mg tablet TAKE ONE TABLET BY MOUTH ONCE DAILY 90 tablet 2    amphetamine-dextroamphetamine (ADDERALL XR) 20 MG 24 hr capsule Take 1 capsule (20 mg total) by mouth 2 (two) times a dayMax Daily Amount: 40 mg 60 capsule 0    atorvastatin (LIPITOR) 20 mg tablet TAKE ONE TABLET BY MOUTH ONCE DAILY 90 tablet 0    Blood Glucose Monitoring Suppl (FreeStyle Lite) DEIRDRE Use daily 1 each 0    buPROPion (WELLBUTRIN XL) 300 mg 24 hr tablet Take 300 mg by mouth every morning       clobetasol (TEMOVATE) 0 05 % cream APPLY TOPICALLY DAILY IN THE EVENING, AFTER 7 DAYS DECREASE TO 4-5 TIMES A WEEK FOR 1 WEEK THEN DECREASE TO THREE TIMES A WEEK 30 g 0    conjugated estrogens (PREMARIN) vaginal cream Insert 1 g into the vagina 2 (two) times a week 30 g 12    cycloSPORINE (RESTASIS) 0 05 % ophthalmic emulsion Administer 1 drop to both eyes 2 (two) times a day      Dexilant 60 MG capsule TAKE ONE CAPSULE BY MOUTH ONCE DAILY 30 capsule 2    dicyclomine (BENTYL) 10 mg capsule       dicyclomine (BENTYL) 20 mg tablet Take 1 tablet (20 mg total) by mouth every 6 (six) hours 30 tablet 0    diphenoxylate-atropine (LOMOTIL) 2 5-0 025 mg per tablet Take 1 tablet by mouth 4 (four) times a day as needed for diarrhea 120 tablet 4     MG capsule TAKE ONE CAPSULE BY MOUTH TWICE A  capsule 1    Easy Comfort Lancets MISC USE TO TEST THE BLOOD SUGAR THREE TIMES A  each 10    FREESTYLE LITE test strip USE TO TEST THE BLOOD SUGAR THREE TIMES A  each 5    hydrocortisone 1 % cream APPLY TOPICALLY TO AFFECTED AREA FOUR TIMES A DAY AS NEEDED FOR IRRITATION 56 g 0    insulin aspart (NovoLOG FlexPen) 100 UNIT/ML injection pen Inject 6 Units under the skin 3 (three) times a day with meals 15 mL 2    insulin glargine (Toujeo Max SoloStar) 300 units/mL CONCENTRATED U-300 injection pen (2-unit dial) Inject 22 Units under the skin daily 3 pen 1    Insulin Pen Needle (PEN NEEDLES) 31G X 8 MM MISC Inject 1 Stick under the skin 4 (four) times a day (with meals and at bedtime) 100 each 6    lidocaine (XYLOCAINE) 5 % ointment APPLY TOPICALLY 2GM TWICE A DAY TO AFFECTED AREAS AS NEEDED FOR MILD PAIN 250 g 8    Lidocaine Viscous HCl (XYLOCAINE) 2 % mucosal solution Swish and spit 15 mL 4 (four) times a day as needed for mouth pain or discomfort 15 mL 1    linaCLOtide (Linzess) 72 MCG CAPS Take 72 mcg by mouth daily 90 capsule 3    loperamide (IMODIUM) 2 mg capsule Take 1 capsule (2 mg total) by mouth 4 (four) times a day as needed for diarrhea (Patient not taking: Reported on 7/16/2021) 12 capsule 0    montelukast (SINGULAIR) 10 mg tablet Take 1 tablet (10 mg total) by mouth daily at bedtime 90 tablet 1    Movantik 25 MG tablet       Nutritional Supplements (Ensure Original) LIQD Take 1 Bottle by mouth 2 (two) times a day 60 Bottle 11    ondansetron (ZOFRAN) 4 mg tablet Take 1 tablet (4 mg total) by mouth every 8 (eight) hours as needed for nausea or vomiting 30 tablet 2    oxyCODONE (ROXICODONE) 10 MG TABS Take 1 tablet (10 mg total) by mouth every 6 (six) hours as needed for moderate painMax Daily Amount: 40 mg 120 tablet 0    oxyCODONE-acetaminophen (PERCOCET)  mg per tablet Take 1 tablet by mouth every 6 (six) hours as needed for moderate painMax Daily Amount: 4 tablets 120 tablet 0    pantoprazole (PROTONIX) 40 mg tablet Take 1 tablet (40 mg total) by mouth 2 (two) times a day before meals 60 tablet 5    Probiotic Product (PROBIOTIC-10 PO) Take 1 tablet by mouth daily      promethazine (PHENERGAN) 25 mg tablet Take 1 tablet (25 mg total) by mouth every 6 (six) hours as needed for nausea or vomiting 60 tablet 3    QUEtiapine (SEROquel) 200 mg tablet Take 150 mg by mouth daily at bedtime       QUEtiapine (SEROquel) 300 mg tablet       scopolamine (TRANSDERM-SCOP) 1 5 mg/3 days TD 72 hr patch Place 1 patch on the skin every third day 10 patch 0    sucralfate (CARAFATE) 1 g/10 mL suspension Take 10 mL (1 g total) by mouth 4 (four) times a day 420 mL 0    TiZANidine (ZANAFLEX) 4 MG capsule Take 1 capsule (4 mg total) by mouth 3 (three) times a day 90 capsule 0     No current facility-administered medications for this visit  Allergies   Allergen Reactions    Prozac [Fluoxetine Hcl]      SI    Bactrim [Sulfamethoxazole-Trimethoprim]      Pt "They think that is what cause the pancreatitis"     Flagyl [Metronidazole] Diarrhea and Abdominal Pain    Lamictal [Lamotrigine] GI Intolerance    Lithium Other (See Comments)    Haldol [Haloperidol] Other (See Comments)     "I don't like it"    Ibuprofen     Lexapro [Escitalopram Oxalate] Rash    Navane [Thiothixene]      SI    Other      "novaine?" antipsychotic       Review of Systems   Constitutional: Positive for activity change and appetite change  Respiratory: Negative for chest tightness and shortness of breath  Cardiovascular: Negative for leg swelling  Gastrointestinal: Positive for abdominal pain and nausea  Genitourinary: Negative for difficulty urinating  Neurological: Positive for numbness  Negative for dizziness, syncope and light-headedness  Video Exam    Vitals:    09/07/21 1011   BP: 108/66       Physical Exam  Constitutional:       Appearance: Normal appearance  HENT:      Head: Normocephalic and atraumatic  Eyes:      General: No scleral icterus  Pulmonary:      Effort: Pulmonary effort is normal    Abdominal:      General: There is no distension  Musculoskeletal:         General: No swelling  Skin:     General: Skin is dry  Findings: No rash  Neurological:      Mental Status: She is alert and oriented to person, place, and time  I spent 20 minutes directly with the patient during this visit    VIRTUAL VISIT DISCLAIMER      Jovani Craven verbally agrees to participate in [employer]   Pt is aware that Virtual Care Services could be limited without vital signs or the ability to perform a full hands-on physical exam  Ciara Segundo Hernandez understands she or the provider may request at any time to terminate the video visit and request the patient to seek care or treatment in person

## 2021-09-07 NOTE — TELEPHONE ENCOUNTER
Patient called the office and requested a refill on her amLODIPine (NORVASC) 5mg tablet 90 day supply sent to [facility] pharmacy

## 2021-09-08 DIAGNOSIS — M54.41 CHRONIC LOW BACK PAIN WITH BILATERAL SCIATICA, UNSPECIFIED BACK PAIN LATERALITY: ICD-10-CM

## 2021-09-08 DIAGNOSIS — M54.42 CHRONIC LOW BACK PAIN WITH BILATERAL SCIATICA, UNSPECIFIED BACK PAIN LATERALITY: ICD-10-CM

## 2021-09-08 DIAGNOSIS — G89.29 CHRONIC LOW BACK PAIN WITH BILATERAL SCIATICA, UNSPECIFIED BACK PAIN LATERALITY: ICD-10-CM

## 2021-09-09 ENCOUNTER — TELEMEDICINE (OUTPATIENT)
Dept: FAMILY MEDICINE CLINIC | Facility: CLINIC | Age: 51
End: 2021-09-09

## 2021-09-09 DIAGNOSIS — B02.29 POSTHERPETIC NEURALGIA: Primary | ICD-10-CM

## 2021-09-09 DIAGNOSIS — K31.84 GASTROPARESIS: ICD-10-CM

## 2021-09-09 DIAGNOSIS — G62.9 NEUROPATHY: ICD-10-CM

## 2021-09-09 PROCEDURE — 1036F TOBACCO NON-USER: CPT | Performed by: PHYSICIAN ASSISTANT

## 2021-09-09 PROCEDURE — 99213 OFFICE O/P EST LOW 20 MIN: CPT | Performed by: PHYSICIAN ASSISTANT

## 2021-09-09 PROCEDURE — 3078F DIAST BP <80 MM HG: CPT | Performed by: PHYSICIAN ASSISTANT

## 2021-09-09 PROCEDURE — 3074F SYST BP LT 130 MM HG: CPT | Performed by: PHYSICIAN ASSISTANT

## 2021-09-09 RX ORDER — GABAPENTIN 300 MG/1
CAPSULE ORAL
Qty: 81 CAPSULE | Refills: 1 | Status: SHIPPED | OUTPATIENT
Start: 2021-09-09 | End: 2021-09-29 | Stop reason: SDUPTHER

## 2021-09-09 RX ORDER — TIZANIDINE HYDROCHLORIDE 4 MG/1
CAPSULE, GELATIN COATED ORAL
Qty: 90 CAPSULE | Refills: 0 | Status: SHIPPED | OUTPATIENT
Start: 2021-09-09 | End: 2021-09-09 | Stop reason: ALTCHOICE

## 2021-09-09 RX ORDER — ONDANSETRON 4 MG/1
4 TABLET, FILM COATED ORAL EVERY 8 HOURS PRN
Qty: 30 TABLET | Refills: 2 | Status: SHIPPED | OUTPATIENT
Start: 2021-09-09 | End: 2021-10-30 | Stop reason: SDUPTHER

## 2021-09-09 NOTE — PROGRESS NOTES
Virtual Regular Visit    Verification of patient location:    Patient is located in the following state in which I hold an active license [place]      Assessment/Plan:    Problem List Items Addressed This Visit        Digestive    Gastroparesis    Relevant Medications    ondansetron (ZOFRAN) 4 mg tablet       Nervous and Auditory    Postherpetic neuralgia - Primary    Neuropathy    Relevant Medications    gabapentin (NEURONTIN) 300 mg capsule        Neuropathy   - Will discontinue tizanidine  Will restart gabapentin 300 mg  Advised patient to start by taking 300 mg daily at bedtime for 3 days then increase to twice daily for 3 days and then increase to 3 times daily  - Continue follow up with neurology as scheduled  Reason for visit is   Chief Complaint   Patient presents with    Virtual Regular Visit        Encounter provider Roseanna Salguero PA-C    Provider located at [address] [address] [phone]      Recent Visits  Date Type Provider Dept   09/07/21 Telephone HOLLIS Solorzano Renetta   Showing recent visits within past 7 days and meeting all other requirements  Today's Visits  Date Type Provider Dept   09/09/21 Telemedicine HOLLIS Hazel   Showing today's visits and meeting all other requirements  Future Appointments  No visits were found meeting these conditions  Showing future appointments within next 150 days and meeting all other requirements       The patient was identified by name and date of birth  Brenden Sotelo was informed that this is a telemedicine visit and that the visit is being conducted through [address] [place] [address] Now and patient was informed that this is a secure, HIPAA-compliant platform  She agrees to proceed     My office door was closed  No one else was in the room  She acknowledged consent and understanding of privacy and security of the video platform   The patient has agreed to participate and understands they can discontinue the visit at any time  Patient is aware this is a billable service  Subjective  Serafin Roque is a 48 y o  female With known past medical history of gastroparesis, GERD, constipation, ADHD, type 2 diabetes mellitus, cataplexy, Wegener's granulomatosis, small fiber neuropathy, bipolar 1 disorder who is seen today via telemedicine for numbness of feet  - Patient notes for the past 3 weeks she has been experiencing increasing numbness of bilateral legs  Patient notes the numbness starts at her buttock area and goes down the back of both legs into her feet  Patient notes she is also experiencing pain of the bottom of both feet  Patient was previously taking both gabapentin and tizanidine for neuropathy, but the combination caused too much sedation, so gabapentin was discontinued  Patient would now like to try gabapentin instead        Past Medical History:   Diagnosis Date    ADHD     Anemia of chronic disease     Anxiety     Asthma     Asthma     Borderline personality disorder (HCC)     Cataplexy     Chronic abdominal pain     CKD (chronic kidney disease) stage 3, GFR 30-59 ml/min (HCC)     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HCC)     Cushing syndrome ([address] )     Diabetes mellitus ([address] )     DVT (deep venous thrombosis) (HCC)     GERD (gastroesophageal reflux disease)     History of acute pancreatitis     felt secondary to Bactrim    History of transfusion     HTN (hypertension)     Hypertension     Liver disease     fatty liver    Microscopic polyangiitis (HCC)     Morbid obesity (HCC)     MPA (microscopic polyangiitis) (HCC)     Ovarian cyst     PTSD (post-traumatic stress disorder)     Renal disorder     Self-inflicted injury     self inflicted skin wounds    Wegener's granulomatosis with renal involvement ([facility] [address] ) 2015       Past Surgical History:   Procedure Laterality Date    COLONOSCOPY      ESOPHAGOGASTRODUODENOSCOPY  09/11/2015 mild antral gastritis    GASTRIC STIMULATOR IMPLANT SURGERY  06/25/2020    [facility] COLONOSCOPY FLX DX W/COLLJ SPEC WHEN PFRMD N/A 12/14/2018    Procedure: COLONOSCOPY with polypectomy;  Surgeon: Maria D Rivera MD;  Location: [facility]; Service: Gastroenterology    VA ESOPHAGOGASTRODUODENOSCOPY TRANSORAL DIAGNOSTIC N/A 12/14/2018    Procedure: ESOPHAGOGASTRODUODENOSCOPY (EGD) with biopsy;  Surgeon: Maria D Rivera MD;  Location: [facility];   Service: Gastroenterology    RELEASE SCAR CONTRACTURE / GRAFT REPAIRS OF HAND Bilateral     UPPER GASTROINTESTINAL ENDOSCOPY  12/26/2019       Current Outpatient Medications   Medication Sig Dispense Refill    al mag oxide-diphenhydramine-lidocaine viscous (MAGIC MOUTHWASH) 1:1:1 suspension Swish and spit 10 mL every 4 (four) hours as needed for mouth pain or discomfort 90 mL 2    albuterol (PROVENTIL HFA,VENTOLIN HFA) 90 mcg/act inhaler INHALE 2 PUFFS BY MOUTH EVERY 6 HOURS AS NEEDED FOR WHEEZING OR SHORTNESS OF BREATH 18 g 1    Alcohol Swabs (ALCOHOL PADS) 70 % PADS by Does not apply route 4 (four) times a day 400 each 3    amLODIPine (NORVASC) 5 mg tablet Take 1 tablet (5 mg total) by mouth daily 90 tablet 2    amphetamine-dextroamphetamine (ADDERALL XR) 20 MG 24 hr capsule Take 1 capsule (20 mg total) by mouth 2 (two) times a dayMax Daily Amount: 40 mg 60 capsule 0    atorvastatin (LIPITOR) 20 mg tablet TAKE ONE TABLET BY MOUTH ONCE DAILY 90 tablet 0    Blood Glucose Monitoring Suppl (FreeStyle Lite) DEIRDRE Use daily 1 each 0    buPROPion (WELLBUTRIN XL) 300 mg 24 hr tablet Take 300 mg by mouth every morning       clobetasol (TEMOVATE) 0 05 % cream APPLY TOPICALLY DAILY IN THE EVENING, AFTER 7 DAYS DECREASE TO 4-5 TIMES A WEEK FOR 1 WEEK THEN DECREASE TO THREE TIMES A WEEK 30 g 0    conjugated estrogens (PREMARIN) vaginal cream Insert 1 g into the vagina 2 (two) times a week 30 g 12    cycloSPORINE (RESTASIS) 0 05 % ophthalmic emulsion Administer 1 drop to both eyes 2 (two) times a day      Dexilant 60 MG capsule TAKE ONE CAPSULE BY MOUTH ONCE DAILY 30 capsule 2    dicyclomine (BENTYL) 10 mg capsule       dicyclomine (BENTYL) 20 mg tablet Take 1 tablet (20 mg total) by mouth every 6 (six) hours 30 tablet 0    diphenoxylate-atropine (LOMOTIL) 2 5-0 025 mg per tablet Take 1 tablet by mouth 4 (four) times a day as needed for diarrhea 120 tablet 4     MG capsule TAKE ONE CAPSULE BY MOUTH TWICE A  capsule 1    Easy Comfort Lancets MISC USE TO TEST THE BLOOD SUGAR THREE TIMES A  each 10    FREESTYLE LITE test strip USE TO TEST THE BLOOD SUGAR THREE TIMES A  each 5    gabapentin (NEURONTIN) 300 mg capsule Take 1 capsule (300 mg total) by mouth daily at bedtime for 3 days, THEN 1 capsule (300 mg total) 2 (two) times a day for 3 days, THEN 1 capsule (300 mg total) 3 (three) times a day for 24 days   81 capsule 1    hydrocortisone 1 % cream APPLY TOPICALLY TO AFFECTED AREA FOUR TIMES A DAY AS NEEDED FOR IRRITATION 56 g 0    insulin aspart (NovoLOG FlexPen) 100 UNIT/ML injection pen Inject 6 Units under the skin 3 (three) times a day with meals 15 mL 2    insulin glargine (Toujeo Max SoloStar) 300 units/mL CONCENTRATED U-300 injection pen (2-unit dial) Inject 22 Units under the skin daily 3 pen 1    Insulin Pen Needle (PEN NEEDLES) 31G X 8 MM MISC Inject 1 Stick under the skin 4 (four) times a day (with meals and at bedtime) 100 each 6    lidocaine (XYLOCAINE) 5 % ointment APPLY TOPICALLY 2GM TWICE A DAY TO AFFECTED AREAS AS NEEDED FOR MILD PAIN 250 g 8    Lidocaine Viscous HCl (XYLOCAINE) 2 % mucosal solution Swish and spit 15 mL 4 (four) times a day as needed for mouth pain or discomfort 15 mL 1    linaCLOtide (Linzess) 72 MCG CAPS Take 72 mcg by mouth daily 90 capsule 3    loperamide (IMODIUM) 2 mg capsule Take 1 capsule (2 mg total) by mouth 4 (four) times a day as needed for diarrhea (Patient not taking: Reported on 7/16/2021) 12 capsule 0    montelukast (SINGULAIR) 10 mg tablet Take 1 tablet (10 mg total) by mouth daily at bedtime 90 tablet 1    Movantik 25 MG tablet       Nutritional Supplements (Ensure Original) LIQD Take 1 Bottle by mouth 2 (two) times a day 60 Bottle 11    ondansetron (ZOFRAN) 4 mg tablet Take 1 tablet (4 mg total) by mouth every 8 (eight) hours as needed for nausea or vomiting 30 tablet 2    oxyCODONE (ROXICODONE) 10 MG TABS Take 1 tablet (10 mg total) by mouth every 6 (six) hours as needed for moderate painMax Daily Amount: 40 mg 120 tablet 0    pantoprazole (PROTONIX) 40 mg tablet Take 1 tablet (40 mg total) by mouth 2 (two) times a day before meals 60 tablet 5    Probiotic Product (PROBIOTIC-10 PO) Take 1 tablet by mouth daily      promethazine (PHENERGAN) 25 mg tablet Take 1 tablet (25 mg total) by mouth every 6 (six) hours as needed for nausea or vomiting 60 tablet 3    QUEtiapine (SEROquel) 200 mg tablet Take 150 mg by mouth daily at bedtime       QUEtiapine (SEROquel) 300 mg tablet       scopolamine (TRANSDERM-SCOP) 1 5 mg/3 days TD 72 hr patch Place 1 patch on the skin every third day 10 patch 0    sucralfate (CARAFATE) 1 g/10 mL suspension Take 10 mL (1 g total) by mouth 4 (four) times a day 420 mL 0     No current facility-administered medications for this visit  Allergies   Allergen Reactions    Prozac [Fluoxetine Hcl]      SI    Bactrim [Sulfamethoxazole-Trimethoprim]      Pt "They think that is what cause the pancreatitis"     Flagyl [Metronidazole] Diarrhea and Abdominal Pain    Lamictal [Lamotrigine] GI Intolerance    Lithium Other (See Comments)    Haldol [Haloperidol] Other (See Comments)     "I don't like it"    Ibuprofen     Lexapro [Escitalopram Oxalate] Rash    Navane [Thiothixene]      SI    Other      "novaine?" antipsychotic       Review of Systems   Constitutional: Negative for chills, fatigue and fever  HENT: Negative for sore throat      Respiratory: Negative for cough, chest tightness and shortness of breath  Cardiovascular: Negative for chest pain and leg swelling  Gastrointestinal: Positive for nausea  Negative for constipation, diarrhea and vomiting  Genitourinary: Negative for dysuria  Musculoskeletal: Positive for arthralgias  Skin: Negative for rash  Neurological: Positive for numbness  Negative for dizziness and headaches  Psychiatric/Behavioral: Negative for behavioral problems  Video Exam    There were no vitals filed for this visit  Physical Exam  Vitals and nursing note reviewed  Constitutional:       General: She is not in acute distress  Appearance: She is well-developed  HENT:      Head: Normocephalic and atraumatic  Right Ear: External ear normal       Left Ear: External ear normal       Nose: Nose normal    Eyes:      Conjunctiva/sclera: Conjunctivae normal    Pulmonary:      Effort: Pulmonary effort is normal  No respiratory distress  Musculoskeletal:      Cervical back: Normal range of motion and neck supple  Skin:     General: Skin is dry  Neurological:      Mental Status: She is alert and oriented to person, place, and time  Psychiatric:         Speech: Speech normal          Behavior: Behavior normal           I spent 22 minutes directly with the patient during this visit    [address] verbally agrees to participate in [employer]  Pt is aware that [employer] could be limited without vital signs or the ability to perform a full hands-on physical Fidencio Aureliano understands she or the provider may request at any time to terminate the video visit and request the patient to seek care or treatment in person

## 2021-09-10 ENCOUNTER — TELEPHONE (OUTPATIENT)
Dept: FAMILY MEDICINE CLINIC | Facility: CLINIC | Age: 51
End: 2021-09-10

## 2021-09-10 NOTE — TELEPHONE ENCOUNTER
SIGNATURES NEEDED FOR Home health certification and plan of care FORM RECEIVED VIA FAX  WILL BE PLACED IN FORM BIN FOR MA PICKUP

## 2021-09-11 ENCOUNTER — APPOINTMENT (EMERGENCY)
Dept: CT IMAGING | Facility: HOSPITAL | Age: 51
End: 2021-09-11
Payer: COMMERCIAL

## 2021-09-11 ENCOUNTER — HOSPITAL ENCOUNTER (EMERGENCY)
Facility: HOSPITAL | Age: 51
Discharge: HOME | End: 2021-09-11
Attending: EMERGENCY MEDICINE | Admitting: EMERGENCY MEDICINE
Payer: COMMERCIAL

## 2021-09-11 VITALS
SYSTOLIC BLOOD PRESSURE: 158 MMHG | RESPIRATION RATE: 16 BRPM | TEMPERATURE: 98.5 F | DIASTOLIC BLOOD PRESSURE: 82 MMHG | HEART RATE: 66 BPM | OXYGEN SATURATION: 97 %

## 2021-09-11 DIAGNOSIS — M54.9 BACK PAIN: ICD-10-CM

## 2021-09-11 DIAGNOSIS — R10.9 ABDOMINAL PAIN: Primary | ICD-10-CM

## 2021-09-11 LAB
ALBUMIN SERPL BCP-MCNC: 3.9 G/DL (ref 3.5–5)
ALP SERPL-CCNC: 137 U/L (ref 46–116)
ALT SERPL W P-5'-P-CCNC: 26 U/L (ref 12–78)
ANION GAP SERPL CALCULATED.3IONS-SCNC: 9 MMOL/L (ref 4–13)
AST SERPL W P-5'-P-CCNC: 25 U/L (ref 5–45)
ATRIAL RATE: 85 BPM
BASOPHILS # BLD AUTO: 0.01 THOUSANDS/ΜL (ref 0–0.1)
BASOPHILS NFR BLD AUTO: 0 % (ref 0–1)
BILIRUB SERPL-MCNC: 0.23 MG/DL (ref 0.2–1)
BUN SERPL-MCNC: 35 MG/DL (ref 5–25)
CALCIUM SERPL-MCNC: 8.8 MG/DL (ref 8.3–10.1)
CHLORIDE SERPL-SCNC: 107 MMOL/L (ref 100–108)
CO2 SERPL-SCNC: 24 MMOL/L (ref 21–32)
CREAT SERPL-MCNC: 2 MG/DL (ref 0.6–1.3)
EOSINOPHIL # BLD AUTO: 0.01 THOUSAND/ΜL (ref 0–0.61)
EOSINOPHIL NFR BLD AUTO: 0 % (ref 0–6)
ERYTHROCYTE [DISTWIDTH] IN BLOOD BY AUTOMATED COUNT: 12.9 % (ref 11.6–15.1)
GFR SERPL CREATININE-BSD FRML MDRD: 28 ML/MIN/1.73SQ M
GLUCOSE SERPL-MCNC: 91 MG/DL (ref 65–140)
HCT VFR BLD AUTO: 40.7 % (ref 34.8–46.1)
HGB BLD-MCNC: 13.4 G/DL (ref 11.5–15.4)
IMM GRANULOCYTES # BLD AUTO: 0.01 THOUSAND/UL (ref 0–0.2)
IMM GRANULOCYTES NFR BLD AUTO: 0 % (ref 0–2)
LIPASE SERPL-CCNC: 543 U/L (ref 73–393)
LYMPHOCYTES # BLD AUTO: 1.29 THOUSANDS/ΜL (ref 0.6–4.47)
LYMPHOCYTES NFR BLD AUTO: 27 % (ref 14–44)
MCH RBC QN AUTO: 31.1 PG (ref 26.8–34.3)
MCHC RBC AUTO-ENTMCNC: 32.9 G/DL (ref 31.4–37.4)
MCV RBC AUTO: 94 FL (ref 82–98)
MONOCYTES # BLD AUTO: 0.38 THOUSAND/ΜL (ref 0.17–1.22)
MONOCYTES NFR BLD AUTO: 8 % (ref 4–12)
NEUTROPHILS # BLD AUTO: 3.07 THOUSANDS/ΜL (ref 1.85–7.62)
NEUTS SEG NFR BLD AUTO: 65 % (ref 43–75)
NRBC BLD AUTO-RTO: 0 /100 WBCS
P AXIS: 71 DEGREES
PLATELET # BLD AUTO: 240 THOUSANDS/UL (ref 149–390)
PMV BLD AUTO: 9.8 FL (ref 8.9–12.7)
POTASSIUM SERPL-SCNC: 4.8 MMOL/L (ref 3.5–5.3)
PR INTERVAL: 138 MS
PROT SERPL-MCNC: 8 G/DL (ref 6.4–8.2)
QRS AXIS: 11 DEGREES
QRSD INTERVAL: 66 MS
QT INTERVAL: 362 MS
QTC INTERVAL: 430 MS
RBC # BLD AUTO: 4.31 MILLION/UL (ref 3.81–5.12)
SODIUM SERPL-SCNC: 140 MMOL/L (ref 136–145)
T WAVE AXIS: 66 DEGREES
VENTRICULAR RATE: 85 BPM
WBC # BLD AUTO: 4.77 THOUSAND/UL (ref 4.31–10.16)

## 2021-09-11 PROCEDURE — 83690 ASSAY OF LIPASE: CPT | Performed by: EMERGENCY MEDICINE

## 2021-09-11 PROCEDURE — 80053 COMPREHEN METABOLIC PANEL: CPT | Performed by: EMERGENCY MEDICINE

## 2021-09-11 PROCEDURE — 99285 EMERGENCY DEPT VISIT HI MDM: CPT

## 2021-09-11 PROCEDURE — 96361 HYDRATE IV INFUSION ADD-ON: CPT

## 2021-09-11 PROCEDURE — 96374 THER/PROPH/DIAG INJ IV PUSH: CPT

## 2021-09-11 PROCEDURE — 71250 CT THORAX DX C-: CPT

## 2021-09-11 PROCEDURE — 85025 COMPLETE CBC W/AUTO DIFF WBC: CPT | Performed by: EMERGENCY MEDICINE

## 2021-09-11 PROCEDURE — 74176 CT ABD & PELVIS W/O CONTRAST: CPT

## 2021-09-11 PROCEDURE — 99284 EMERGENCY DEPT VISIT MOD MDM: CPT | Performed by: EMERGENCY MEDICINE

## 2021-09-11 PROCEDURE — 93005 ELECTROCARDIOGRAM TRACING: CPT

## 2021-09-11 PROCEDURE — 96375 TX/PRO/DX INJ NEW DRUG ADDON: CPT

## 2021-09-11 PROCEDURE — 36415 COLL VENOUS BLD VENIPUNCTURE: CPT | Performed by: EMERGENCY MEDICINE

## 2021-09-11 PROCEDURE — 93010 ELECTROCARDIOGRAM REPORT: CPT | Performed by: INTERNAL MEDICINE

## 2021-09-11 RX ORDER — HYDROMORPHONE HCL/PF 1 MG/ML
1 SYRINGE (ML) INJECTION ONCE
Status: COMPLETED | OUTPATIENT
Start: 2021-09-11 | End: 2021-09-11

## 2021-09-11 RX ORDER — METOCLOPRAMIDE HYDROCHLORIDE 5 MG/ML
10 INJECTION INTRAMUSCULAR; INTRAVENOUS ONCE
Status: COMPLETED | OUTPATIENT
Start: 2021-09-11 | End: 2021-09-11

## 2021-09-11 RX ORDER — ONDANSETRON 2 MG/ML
4 INJECTION INTRAMUSCULAR; INTRAVENOUS ONCE
Status: COMPLETED | OUTPATIENT
Start: 2021-09-11 | End: 2021-09-11

## 2021-09-11 RX ADMIN — SODIUM CHLORIDE 1000 ML: 0.9 INJECTION, SOLUTION INTRAVENOUS at 12:04

## 2021-09-11 RX ADMIN — ONDANSETRON 4 MG: 2 INJECTION INTRAMUSCULAR; INTRAVENOUS at 12:06

## 2021-09-11 RX ADMIN — HYDROMORPHONE HYDROCHLORIDE 1 MG: 1 INJECTION, SOLUTION INTRAMUSCULAR; INTRAVENOUS; SUBCUTANEOUS at 12:04

## 2021-09-11 RX ADMIN — METOCLOPRAMIDE 10 MG: 5 INJECTION, SOLUTION INTRAMUSCULAR; INTRAVENOUS at 13:28

## 2021-09-11 NOTE — ED NOTES
Pt reports that she cannot wear a mask due to a medical condition     Rosemarie Mckeon RN  09/11/21 [id number]

## 2021-09-11 NOTE — DISCHARGE INSTRUCTIONS
Continue your pain medication as prescribed, follow up with your primary care and pain specialist   Return as needed for severe increasing pain, uncontrolled vomiting, fever, or other new concerns

## 2021-09-11 NOTE — ED PROVIDER NOTES
History  Chief Complaint   Patient presents with    Abdominal Pain     Patient reports abd pain that radiates around the L side to her lower back  Reports nausea   Chest Pain     Chest pain last night and this am  Denies at present time but reports it is intermittent     47 yo F with gastroparesis, GERD, constipation, ADHD, type 2 diabetes mellitus, cataplexy, Wegener's granulomatosis, small fiber neuropathy, bipolar 1 disorder c/o abdominal pain localizing to LLQ and radiation to her back, similar to chronic, recurrent pain, with more back pain than usual   She has nausea, no vomiting, no fever  She uses a motorized scooter because she is prone to falling, which she says she fell a few days ago, which is why she thinks she may have some acute mid low back pain  She denies bowel or bladder dysfunction  She takes percocet for chronic pain  PDMP reviewed      History provided by:  Patient  Abdominal Pain  Associated symptoms: chest pain    Chest Pain  Associated symptoms: abdominal pain        Prior to Admission Medications   Prescriptions Last Dose Informant Patient Reported? Taking?    Alcohol Swabs (ALCOHOL PADS) 70 % PADS  Self No No   Sig: by Does not apply route 4 (four) times a day   Blood Glucose Monitoring Suppl (FreeStyle Lite) DEIRDRE   No No   Sig: Use daily    MG capsule   No No   Sig: TAKE ONE CAPSULE BY MOUTH TWICE A DAY   Dexilant 60 MG capsule   No No   Sig: TAKE ONE CAPSULE BY MOUTH ONCE DAILY   Easy Comfort Lancets MISC   No No   Sig: USE TO TEST THE BLOOD SUGAR THREE TIMES A DAY   FREESTYLE LITE test strip   No No   Sig: USE TO TEST THE BLOOD SUGAR THREE TIMES A DAY   Insulin Pen Needle (PEN NEEDLES) 31G X 8 MM MISC  Self No No   Sig: Inject 1 Stick under the skin 4 (four) times a day (with meals and at bedtime)   Lidocaine Viscous HCl (XYLOCAINE) 2 % mucosal solution   No No   Sig: Swish and spit 15 mL 4 (four) times a day as needed for mouth pain or discomfort   Movantik 25 MG tablet Yes No   Nutritional Supplements (Ensure Original) LIQD   No No   Sig: Take 1 Bottle by mouth 2 (two) times a day   Probiotic Product (PROBIOTIC-10 PO)  Self Yes No   Sig: Take 1 tablet by mouth daily   QUEtiapine (SEROquel) 200 mg tablet   Yes No   Sig: Take 150 mg by mouth daily at bedtime    QUEtiapine (SEROquel) 300 mg tablet   Yes No   al mag oxide-diphenhydramine-lidocaine viscous (MAGIC MOUTHWASH) 1:1:1 suspension   No No   Sig: Swish and spit 10 mL every 4 (four) hours as needed for mouth pain or discomfort   albuterol (PROVENTIL HFA,VENTOLIN HFA) 90 mcg/act inhaler   No No   Sig: INHALE 2 PUFFS BY MOUTH EVERY 6 HOURS AS NEEDED FOR WHEEZING OR SHORTNESS OF BREATH   amLODIPine (NORVASC) 5 mg tablet   No No   Sig: Take 1 tablet (5 mg total) by mouth daily   amphetamine-dextroamphetamine (ADDERALL XR) 20 MG 24 hr capsule   No No   Sig: Take 1 capsule (20 mg total) by mouth 2 (two) times a dayMax Daily Amount: 40 mg   atorvastatin (LIPITOR) 20 mg tablet   No No   Sig: TAKE ONE TABLET BY MOUTH ONCE DAILY   buPROPion (WELLBUTRIN XL) 300 mg 24 hr tablet   Yes No   Sig: Take 300 mg by mouth every morning    clobetasol (TEMOVATE) 0 05 % cream   No No   Sig: APPLY TOPICALLY DAILY IN THE EVENING, AFTER 7 DAYS DECREASE TO 4-5 TIMES A WEEK FOR 1 WEEK THEN DECREASE TO THREE TIMES A WEEK   conjugated estrogens (PREMARIN) vaginal cream   No No   Sig: Insert 1 g into the vagina 2 (two) times a week   cycloSPORINE (RESTASIS) 0 05 % ophthalmic emulsion  Self Yes No   Sig: Administer 1 drop to both eyes 2 (two) times a day   dicyclomine (BENTYL) 10 mg capsule   Yes No   dicyclomine (BENTYL) 20 mg tablet   No No   Sig: Take 1 tablet (20 mg total) by mouth every 6 (six) hours   diphenoxylate-atropine (LOMOTIL) 2 5-0 025 mg per tablet   No No   Sig: Take 1 tablet by mouth 4 (four) times a day as needed for diarrhea   gabapentin (NEURONTIN) 300 mg capsule   No No   Sig: Take 1 capsule (300 mg total) by mouth daily at bedtime for 3 days, THEN 1 capsule (300 mg total) 2 (two) times a day for 3 days, THEN 1 capsule (300 mg total) 3 (three) times a day for 24 days     hydrocortisone 1 % cream   No No   Sig: APPLY TOPICALLY TO AFFECTED AREA FOUR TIMES A DAY AS NEEDED FOR IRRITATION   insulin aspart (NovoLOG FlexPen) 100 UNIT/ML injection pen   No No   Sig: Inject 6 Units under the skin 3 (three) times a day with meals   insulin glargine (Toujeo Max SoloStar) 300 units/mL CONCENTRATED U-300 injection pen (2-unit dial)   No No   Sig: Inject 22 Units under the skin daily   lidocaine (XYLOCAINE) 5 % ointment   No No   Sig: APPLY TOPICALLY 2GM TWICE A DAY TO AFFECTED AREAS AS NEEDED FOR MILD PAIN   linaCLOtide (Linzess) 72 MCG CAPS   No No   Sig: Take 72 mcg by mouth daily   loperamide (IMODIUM) 2 mg capsule   No No   Sig: Take 1 capsule (2 mg total) by mouth 4 (four) times a day as needed for diarrhea   Patient not taking: Reported on 7/16/2021   montelukast (SINGULAIR) 10 mg tablet   No No   Sig: Take 1 tablet (10 mg total) by mouth daily at bedtime   ondansetron (ZOFRAN) 4 mg tablet   No No   Sig: Take 1 tablet (4 mg total) by mouth every 8 (eight) hours as needed for nausea or vomiting   oxyCODONE (ROXICODONE) 10 MG TABS   No No   Sig: Take 1 tablet (10 mg total) by mouth every 6 (six) hours as needed for moderate painMax Daily Amount: 40 mg   pantoprazole (PROTONIX) 40 mg tablet   No No   Sig: Take 1 tablet (40 mg total) by mouth 2 (two) times a day before meals   promethazine (PHENERGAN) 25 mg tablet   No No   Sig: Take 1 tablet (25 mg total) by mouth every 6 (six) hours as needed for nausea or vomiting   scopolamine (TRANSDERM-SCOP) 1 5 mg/3 days TD 72 hr patch   No No   Sig: Place 1 patch on the skin every third day   sucralfate (CARAFATE) 1 g/10 mL suspension   No No   Sig: Take 10 mL (1 g total) by mouth 4 (four) times a day      Facility-Administered Medications: None       Past Medical History:   Diagnosis Date    ADHD     Anemia of chronic disease     Anxiety     Asthma     Asthma     Borderline personality disorder (HCC)     Cataplexy     Chronic abdominal pain     CKD (chronic kidney disease) stage 3, GFR 30-59 ml/min (HCC)     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HCC)     [facility]ushing syndrome ([facility] [address] )     Diabetes mellitus ([facility] )     DVT (deep venous thrombosis) (HCC)     GERD (gastroesophageal reflux disease)     History of acute pancreatitis     felt secondary to Bactrim    History of transfusion     HTN (hypertension)     Hypertension     Liver disease     fatty liver    Microscopic polyangiitis (HCC)     Morbid obesity (HCC)     MPA (microscopic polyangiitis) (HCC)     Ovarian cyst     PTSD (post-traumatic stress disorder)     Renal disorder     Self-inflicted injury     self inflicted skin wounds    Wegener's granulomatosis with renal involvement ([facility] ) 2015       Past Surgical History:   Procedure Laterality Date    COLONOSCOPY      ESOPHAGOGASTRODUODENOSCOPY  09/11/2015    mild antral gastritis    GASTRIC STIMULATOR IMPLANT SURGERY  06/25/2020    NE COLONOSCOPY FLX DX W/COLLJ SPEC WHEN PFRMD N/A 12/14/2018    Procedure: COLONOSCOPY with polypectomy;  Surgeon: Maria D Rivera MD;  Location: [facility]; Service: Gastroenterology    NE ESOPHAGOGASTRODUODENOSCOPY TRANSORAL DIAGNOSTIC N/A 12/14/2018    Procedure: ESOPHAGOGASTRODUODENOSCOPY (EGD) with biopsy;  Surgeon: Maria D Rivera MD;  Location: [facility]; Service: Gastroenterology    RELEASE SCAR CONTRACTURE / GRAFT REPAIRS OF HAND Bilateral     UPPER GASTROINTESTINAL ENDOSCOPY  12/26/2019       Family History   Problem Relation Age of Onset    No Known Problems Mother     No Known Problems Father     Colon cancer Neg Hx     Drug abuse Neg Hx         mother father    Mental illness Neg Hx         disorder, mother father    Cancer Neg Hx     Breast cancer Neg Hx      I have reviewed and agree with the history as documented      E-Cigarette/Vaping    E-Cigarette Use Never User      E-Cigarette/Vaping Substances    Nicotine No     THC No     CBD No      Social History     Tobacco Use    Smoking status: Former Smoker     Quit date: 2011     Years since quitting: 10 7    Smokeless tobacco: Never Used    Tobacco comment: marijuana   Vaping Use    Vaping Use: Never used   Substance Use Topics    Alcohol use: Never    Drug use: Yes     Types: Marijuana     Comment: marijuana daily       Review of Systems   Cardiovascular: Positive for chest pain  Gastrointestinal: Positive for abdominal pain  Physical Exam  Physical Exam  Vitals and nursing note reviewed  Constitutional:       Appearance: She is well-developed  She is not toxic-appearing or diaphoretic  HENT:      Head: Normocephalic and atraumatic  Right Ear: Tympanic membrane and external ear normal       Left Ear: Tympanic membrane and external ear normal       Nose: Nose normal    Eyes:      Conjunctiva/sclera: Conjunctivae normal       Pupils: Pupils are equal, round, and reactive to light  Neck:      Meningeal: Brudzinski's sign and Kernig's sign absent  Cardiovascular:      Rate and Rhythm: Normal rate and regular rhythm  Pulses: Normal pulses  Heart sounds: Normal heart sounds  No murmur heard  Pulmonary:      Effort: Pulmonary effort is normal  No tachypnea or respiratory distress  Breath sounds: Normal breath sounds  No wheezing  Abdominal:      General: Bowel sounds are normal  There is no distension  Palpations: Abdomen is soft  Abdomen is not rigid  Tenderness: There is no abdominal tenderness  There is no guarding or rebound  Musculoskeletal:         General: Normal range of motion  Cervical back: Full passive range of motion without pain, normal range of motion and neck supple  Right lower leg: No swelling  Left lower leg: No swelling  Lymphadenopathy:      Cervical: No cervical adenopathy  Skin:     General: Skin is warm and dry  Coloration: Skin is not pale  Findings: No rash  Neurological:      Mental Status: She is alert and oriented to person, place, and time  GCS: GCS eye subscore is 4  GCS verbal subscore is 5  GCS motor subscore is 6  Cranial Nerves: No cranial nerve deficit  Sensory: No sensory deficit  Coordination: Coordination normal       Gait: Gait normal       Deep Tendon Reflexes: Reflexes are normal and symmetric  Psychiatric:         Speech: Speech normal          Behavior: Behavior normal          Thought Content:  Thought content normal          Judgment: Judgment normal          Vital Signs  ED Triage Vitals [09/11/21 1018]   Temperature Pulse Respirations Blood Pressure SpO2   98 5 °F (36 9 °C) 76 17 132/63 98 %      Temp Source Heart Rate Source Patient Position - Orthostatic VS BP Location FiO2 (%)   Oral Monitor Sitting Right arm --      Pain Score       8           Vitals:    09/11/21 1018 09/11/21 1330   BP: 132/63 158/82   Pulse: 76 66   Patient Position - Orthostatic VS: Sitting Lying         Visual Acuity      ED Medications  Medications   HYDROmorphone (DILAUDID) injection 1 mg (1 mg Intravenous Given 9/11/21 1204)   sodium chloride 0 9 % bolus 1,000 mL (0 mL Intravenous Stopped 9/11/21 1329)   ondansetron (ZOFRAN) injection 4 mg (4 mg Intravenous Given 9/11/21 1206)   metoclopramide (REGLAN) injection 10 mg (10 mg Intravenous Given 9/11/21 1328)       Diagnostic Studies  Results Reviewed     Procedure Component Value Units Date/Time    Comprehensive metabolic panel [[id number]]  (Abnormal) Collected: 09/11/21 1159    Lab Status: Final result Specimen: Blood from Arm, Right Updated: 09/11/21 1236     Sodium 140 mmol/L      Potassium 4 8 mmol/L      Chloride 107 mmol/L      CO2 24 mmol/L      ANION GAP 9 mmol/L      BUN 35 mg/dL      Creatinine 2 00 mg/dL      Glucose 91 mg/dL      Calcium 8 8 mg/dL      AST 25 U/L      ALT 26 U/L      Alkaline Phosphatase 137 U/L      Total Protein 8 0 g/dL      Albumin 3 9 g/dL      Total Bilirubin 0 23 mg/dL      eGFR 28 ml/min/1 73sq m     Narrative:      National Kidney Disease Foundation guidelines for Chronic Kidney Disease (CKD):     Stage 1 with normal or high GFR (GFR > 90 mL/min/1 73 square meters)    Stage 2 Mild CKD (GFR = 60-89 mL/min/1 73 square meters)    Stage 3A Moderate CKD (GFR = 45-59 mL/min/1 73 square meters)    Stage 3B Moderate CKD (GFR = 30-44 mL/min/1 73 square meters)    Stage 4 Severe CKD (GFR = 15-29 mL/min/1 73 square meters)    Stage 5 End Stage CKD (GFR <15 mL/min/1 73 square meters)  Note: GFR calculation is accurate only with a steady state creatinine    Lipase [[id number]]  (Abnormal) Collected: 09/11/21 1159    Lab Status: Final result Specimen: Blood from Arm, Right Updated: 09/11/21 1236     Lipase 543 u/L     CBC and differential [[id number]] Collected: 09/11/21 1159    Lab Status: Final result Specimen: Blood from Arm, Right Updated: 09/11/21 1211     WBC 4 77 Thousand/uL      RBC 4 31 Million/uL      Hemoglobin 13 4 g/dL      Hematocrit 40 7 %      MCV 94 fL      MCH 31 1 pg      MCHC 32 9 g/dL      RDW 12 9 %      MPV 9 8 fL      Platelets 490 Thousands/uL      nRBC 0 /100 WBCs      Neutrophils Relative 65 %      Immat GRANS % 0 %      Lymphocytes Relative 27 %      Monocytes Relative 8 %      Eosinophils Relative 0 %      Basophils Relative 0 %      Neutrophils Absolute 3 07 Thousands/µL      Immature Grans Absolute 0 01 Thousand/uL      Lymphocytes Absolute 1 29 Thousands/µL      Monocytes Absolute 0 38 Thousand/µL      Eosinophils Absolute 0 01 Thousand/µL      Basophils Absolute 0 01 Thousands/µL                  CT chest abdomen pelvis wo contrast   Final Result by Edi Lucia MD (09/11 2124)      No acute abnormality in the chest, abdomen or pelvis is seen                    Workstation performed: [id number]         CT recon only thoracolumbar (no charge)   Final Result by Edi Lucia MD (27/88 5229)      No fracture or traumatic subluxation  Workstation performed: [id number]                    Procedures  Procedures         ED Course  ED Course as of Sep 11 1446   Sat Sep 11, 2021   1317 C/w and slightly from recent baseline for which she follows up with nephrology   Creatinine(!): 2 00   1410 No acute findings   CT chest abdomen pelvis wo contrast   1412 Elevated, not in pancreatitis range, and none on CT   Lipase(!): 543   1417 No fracture   CT recon only thoracolumbar (no charge)   1419 Reviewed results with patient at bedside and updated on the plan  Labs and imaging are reassuring with no acute findings requiring intervention  I do not suspect pancreatitis a this time  Her pain is controlled to her satisfaction and she is precribed percocet for chronic pain  She can be discharged  MDM    Disposition  Final diagnoses:   Abdominal pain   Back pain     Time reflects when diagnosis was documented in both MDM as applicable and the Disposition within this note     Time User Action Codes Description Comment    9/11/2021  2:21 PM Humphrey SHARIF Add [R10 9] Abdominal pain     9/11/2021  2:21 PM Geoffrey Mcgregor Add [M54 9] Back pain       ED Disposition     ED Disposition Condition Date/Time Comment    Discharge Good Sat Sep 11, 2021  2:21 PM Dafne Dao discharge to home/self care              Follow-up Information     Follow up With Specialties Details Why [address] PA-C Family Medicine Call  For followup [address]  [facility]  [address]  [place]  [address]       [facility]  Call  For followup [address] [place] [address] [phone]          Discharge Medication List as of 9/11/2021  2:22 PM      CONTINUE these medications which have NOT CHANGED    Details   al mag oxide-diphenhydramine-lidocaine viscous (MAGIC MOUTHWASH) 1:1:1 suspension Swish and spit 10 mL every 4 (four) hours as needed for mouth pain or discomfort, Starting Fri 7/23/2021, Normal      albuterol (PROVENTIL HFA,VENTOLIN HFA) 90 mcg/act inhaler INHALE 2 PUFFS BY MOUTH EVERY 6 HOURS AS NEEDED FOR WHEEZING OR SHORTNESS OF BREATH, Normal      Alcohol Swabs (ALCOHOL PADS) 70 % PADS by Does not apply route 4 (four) times a day, Starting Fri 6/7/2019, Normal      amLODIPine (NORVASC) 5 mg tablet Take 1 tablet (5 mg total) by mouth daily, Starting Tue 9/7/2021, Normal      amphetamine-dextroamphetamine (ADDERALL XR) 20 MG 24 hr capsule Take 1 capsule (20 mg total) by mouth 2 (two) times a dayMax Daily Amount: 40 mg, Starting Fri 8/27/2021, Normal      atorvastatin (LIPITOR) 20 mg tablet TAKE ONE TABLET BY MOUTH ONCE DAILY, Normal      Blood Glucose Monitoring Suppl (FreeStyle Lite) DEIRDRE Use daily, Starting Fri 7/23/2021, Normal      buPROPion (WELLBUTRIN XL) 300 mg 24 hr tablet Take 300 mg by mouth every morning , Starting Fri 12/4/2020, Historical Med      clobetasol (TEMOVATE) 0 05 % cream APPLY TOPICALLY DAILY IN THE EVENING, AFTER 7 DAYS DECREASE TO 4-5 TIMES A WEEK FOR 1 WEEK THEN DECREASE TO THREE TIMES A WEEK, Normal      conjugated estrogens (PREMARIN) vaginal cream Insert 1 g into the vagina 2 (two) times a week, Starting Thu 5/20/2021, Normal      cycloSPORINE (RESTASIS) 0 05 % ophthalmic emulsion Administer 1 drop to both eyes 2 (two) times a day, Historical Med      Dexilant 60 MG capsule TAKE ONE CAPSULE BY MOUTH ONCE DAILY, Normal      dicyclomine (BENTYL) 10 mg capsule Starting Fri 11/27/2020, Historical Med      dicyclomine (BENTYL) 20 mg tablet Take 1 tablet (20 mg total) by mouth every 6 (six) hours, Starting Sun 5/9/2021, Normal      diphenoxylate-atropine (LOMOTIL) 2 5-0 025 mg per tablet Take 1 tablet by mouth 4 (four) times a day as needed for diarrhea, Starting Mon 5/10/2021, Normal       MG capsule TAKE ONE CAPSULE BY MOUTH TWICE A DAY, Normal      Easy Comfort Lancets MISC USE TO TEST THE BLOOD SUGAR THREE TIMES A DAY, Normal      FREESTYLE LITE test strip USE TO TEST THE BLOOD SUGAR THREE TIMES A DAY, Normal      gabapentin (NEURONTIN) 300 mg capsule Multiple Dosages:Starting Thu 9/9/2021, Until Sat 9/11/2021 at 2359, THEN Starting Sun 9/12/2021, Until Tue 9/14/2021 at 2359, THEN Starting Wed 9/15/2021, Until Fri 10/8/2021 at 2359Take 1 capsule (300 mg total) by mouth daily at bedtime for 3 days,  THEN 1 capsule (300 mg total) 2 (two) times a day for 3 days, THEN 1 capsule (300 mg total) 3 (three) times a day for 24 days  , Normal      hydrocortisone 1 % cream APPLY TOPICALLY TO AFFECTED AREA FOUR TIMES A DAY AS NEEDED FOR IRRITATION, Normal      insulin aspart (NovoLOG FlexPen) 100 UNIT/ML injection pen Inject 6 Units under the skin 3 (three) times a day with meals, Starting Thu 9/24/2020, Normal      insulin glargine (Toujeo Max SoloStar) 300 units/mL CONCENTRATED U-300 injection pen (2-unit dial) Inject 22 Units under the skin daily, Starting Wed 6/2/2021, Normal      Insulin Pen Needle (PEN NEEDLES) 31G X 8 MM MISC Inject 1 Stick under the skin 4 (four) times a day (with meals and at bedtime), Starting Wed 3/28/2018, Normal      lidocaine (XYLOCAINE) 5 % ointment APPLY TOPICALLY 2GM TWICE A DAY TO AFFECTED AREAS AS NEEDED FOR MILD PAIN, Normal      Lidocaine Viscous HCl (XYLOCAINE) 2 % mucosal solution Swish and spit 15 mL 4 (four) times a day as needed for mouth pain or discomfort, Starting Fri 7/16/2021, Normal      linaCLOtide (Linzess) 72 MCG CAPS Take 72 mcg by mouth daily, Starting Thu 2/18/2021, Normal      loperamide (IMODIUM) 2 mg capsule Take 1 capsule (2 mg total) by mouth 4 (four) times a day as needed for diarrhea, Starting Wed 5/5/2021, Normal      montelukast (SINGULAIR) 10 mg tablet Take 1 tablet (10 mg total) by mouth daily at bedtime, Starting Fri 5/14/2021, Normal      Movantik 25 MG tablet Starting Sat 5/22/2021, Historical Med      Nutritional Supplements (Ensure Original) LIQD Take 1 Bottle by mouth 2 (two) times a day, Starting Tue 10/13/2020, Print      ondansetron (ZOFRAN) 4 mg tablet Take 1 tablet (4 mg total) by mouth every 8 (eight) hours as needed for nausea or vomiting, Starting Thu 9/9/2021, Normal      oxyCODONE (ROXICODONE) 10 MG TABS Take 1 tablet (10 mg total) by mouth every 6 (six) hours as needed for moderate painMax Daily Amount: 40 mg, Starting Tue 9/7/2021, Normal      pantoprazole (PROTONIX) 40 mg tablet Take 1 tablet (40 mg total) by mouth 2 (two) times a day before meals, Starting Wed 8/26/2020, Normal      Probiotic Product (PROBIOTIC-10 PO) Take 1 tablet by mouth daily, Historical Med      promethazine (PHENERGAN) 25 mg tablet Take 1 tablet (25 mg total) by mouth every 6 (six) hours as needed for nausea or vomiting, Starting Mon 5/10/2021, Normal      !! QUEtiapine (SEROquel) 200 mg tablet Take 150 mg by mouth daily at bedtime , Historical Med      !! QUEtiapine (SEROquel) 300 mg tablet Starting Wed 5/19/2021, Historical Med      scopolamine (TRANSDERM-SCOP) 1 5 mg/3 days TD 72 hr patch Place 1 patch on the skin every third day, Starting Tue 4/27/2021, Normal      sucralfate (CARAFATE) 1 g/10 mL suspension Take 10 mL (1 g total) by mouth 4 (four) times a day, Starting Sun 5/9/2021, Normal       !! - Potential duplicate medications found  Please discuss with provider  No discharge procedures on file      PDMP Review       Value Time User    PDMP Reviewed  Yes 9/9/2021 11:20 AM Ashley Fatima PA-C          ED Provider  Electronically Signed by           Teresa Donahue MD  09/11/21 6697

## 2021-09-14 ENCOUNTER — LAB (OUTPATIENT)
Dept: LAB | Facility: CLINIC | Age: 51
End: 2021-09-14
Payer: COMMERCIAL

## 2021-09-14 DIAGNOSIS — K31.84 GASTROPARESIS: ICD-10-CM

## 2021-09-14 DIAGNOSIS — N18.30 ACUTE RENAL FAILURE SUPERIMPOSED ON STAGE 3 CHRONIC KIDNEY DISEASE (HCC): ICD-10-CM

## 2021-09-14 DIAGNOSIS — M31.31 GRANULOMATOSIS WITH POLYANGIITIS WITH RENAL INVOLVEMENT (HCC): ICD-10-CM

## 2021-09-14 DIAGNOSIS — N18.4 CKD (CHRONIC KIDNEY DISEASE) STAGE 4, GFR 15-29 ML/MIN (HCC): ICD-10-CM

## 2021-09-14 DIAGNOSIS — N18.32 ACUTE RENAL FAILURE SUPERIMPOSED ON STAGE 3B CHRONIC KIDNEY DISEASE, UNSPECIFIED ACUTE RENAL FAILURE TYPE (HCC): ICD-10-CM

## 2021-09-14 DIAGNOSIS — N18.4 BENIGN HYPERTENSION WITH CKD (CHRONIC KIDNEY DISEASE) STAGE IV (HCC): Chronic | ICD-10-CM

## 2021-09-14 DIAGNOSIS — D63.8 ANEMIA OF CHRONIC DISEASE: ICD-10-CM

## 2021-09-14 DIAGNOSIS — I12.9 BENIGN HYPERTENSION WITH CKD (CHRONIC KIDNEY DISEASE) STAGE IV (HCC): Chronic | ICD-10-CM

## 2021-09-14 DIAGNOSIS — N17.9 ACUTE RENAL FAILURE SUPERIMPOSED ON STAGE 3B CHRONIC KIDNEY DISEASE, UNSPECIFIED ACUTE RENAL FAILURE TYPE (HCC): ICD-10-CM

## 2021-09-14 DIAGNOSIS — R80.1 PERSISTENT PROTEINURIA: ICD-10-CM

## 2021-09-14 DIAGNOSIS — M31.7 MPA (MICROSCOPIC POLYANGIITIS) (HCC): Chronic | ICD-10-CM

## 2021-09-14 DIAGNOSIS — N17.9 ACUTE RENAL FAILURE SUPERIMPOSED ON STAGE 3 CHRONIC KIDNEY DISEASE (HCC): ICD-10-CM

## 2021-09-14 LAB
ALBUMIN SERPL BCP-MCNC: 3.7 G/DL (ref 3.5–5)
ALP SERPL-CCNC: 137 U/L (ref 46–116)
ALT SERPL W P-5'-P-CCNC: 24 U/L (ref 12–78)
ANION GAP SERPL CALCULATED.3IONS-SCNC: 4 MMOL/L (ref 4–13)
AST SERPL W P-5'-P-CCNC: 14 U/L (ref 5–45)
BILIRUB SERPL-MCNC: 0.25 MG/DL (ref 0.2–1)
BUN SERPL-MCNC: 42 MG/DL (ref 5–25)
CALCIUM SERPL-MCNC: 9.5 MG/DL (ref 8.3–10.1)
CHLORIDE SERPL-SCNC: 112 MMOL/L (ref 100–108)
CO2 SERPL-SCNC: 25 MMOL/L (ref 21–32)
CREAT SERPL-MCNC: 2.22 MG/DL (ref 0.6–1.3)
ERYTHROCYTE [DISTWIDTH] IN BLOOD BY AUTOMATED COUNT: 13.4 % (ref 11.6–15.1)
GFR SERPL CREATININE-BSD FRML MDRD: 25 ML/MIN/1.73SQ M
GLUCOSE SERPL-MCNC: 79 MG/DL (ref 65–140)
HCT VFR BLD AUTO: 39.3 % (ref 34.8–46.1)
HGB BLD-MCNC: 12.6 G/DL (ref 11.5–15.4)
MCH RBC QN AUTO: 30.4 PG (ref 26.8–34.3)
MCHC RBC AUTO-ENTMCNC: 32.1 G/DL (ref 31.4–37.4)
MCV RBC AUTO: 95 FL (ref 82–98)
PHOSPHATE SERPL-MCNC: 3.4 MG/DL (ref 2.7–4.5)
PLATELET # BLD AUTO: 265 THOUSANDS/UL (ref 149–390)
PMV BLD AUTO: 11 FL (ref 8.9–12.7)
POTASSIUM SERPL-SCNC: 4.9 MMOL/L (ref 3.5–5.3)
PROT SERPL-MCNC: 8 G/DL (ref 6.4–8.2)
PTH-INTACT SERPL-MCNC: 72.7 PG/ML (ref 18.4–80.1)
RBC # BLD AUTO: 4.15 MILLION/UL (ref 3.81–5.12)
SODIUM SERPL-SCNC: 141 MMOL/L (ref 136–145)
WBC # BLD AUTO: 4.97 THOUSAND/UL (ref 4.31–10.16)

## 2021-09-14 PROCEDURE — 80053 COMPREHEN METABOLIC PANEL: CPT

## 2021-09-14 PROCEDURE — 83970 ASSAY OF PARATHORMONE: CPT

## 2021-09-14 PROCEDURE — 84100 ASSAY OF PHOSPHORUS: CPT

## 2021-09-14 PROCEDURE — 85027 COMPLETE CBC AUTOMATED: CPT

## 2021-09-14 PROCEDURE — 36415 COLL VENOUS BLD VENIPUNCTURE: CPT

## 2021-09-14 PROCEDURE — 86255 FLUORESCENT ANTIBODY SCREEN: CPT

## 2021-09-14 NOTE — TELEPHONE ENCOUNTER
Form completed by provider on 49/85/66 Home health certification and plan of care    Faxed to  Confirmation received    Completed form placed in pod [employer]courier  folder

## 2021-09-15 ENCOUNTER — TELEPHONE (OUTPATIENT)
Dept: NEPHROLOGY | Facility: CLINIC | Age: 51
End: 2021-09-15

## 2021-09-15 NOTE — TELEPHONE ENCOUNTER
Patient called the office today wanting to speak with Dr Favian Pascual concerning the infusions he would like her to have done    She would like a call at [phone]

## 2021-09-16 DIAGNOSIS — Z79.4 TYPE 2 DIABETES MELLITUS WITHOUT COMPLICATION, WITH LONG-TERM CURRENT USE OF INSULIN (HCC): ICD-10-CM

## 2021-09-16 DIAGNOSIS — E11.9 TYPE 2 DIABETES MELLITUS WITHOUT COMPLICATION, WITH LONG-TERM CURRENT USE OF INSULIN (HCC): ICD-10-CM

## 2021-09-16 RX ORDER — BLOOD-GLUCOSE METER
KIT MISCELLANEOUS
Qty: 100 STRIP | Refills: 4 | Status: SHIPPED | OUTPATIENT
Start: 2021-09-16 | End: 2021-10-30 | Stop reason: SDUPTHER

## 2021-09-23 ENCOUNTER — TELEPHONE (OUTPATIENT)
Dept: FAMILY MEDICINE CLINIC | Facility: CLINIC | Age: 51
End: 2021-09-23

## 2021-09-23 DIAGNOSIS — N18.32 STAGE 3B CHRONIC KIDNEY DISEASE (HCC): Primary | ICD-10-CM

## 2021-09-23 NOTE — TELEPHONE ENCOUNTER
Patient is requesting for Blood Work (Due to her kidneys) and will like to do the BW before schedule appt

## 2021-09-28 DIAGNOSIS — F31.9 BIPOLAR 1 DISORDER (HCC): ICD-10-CM

## 2021-09-28 RX ORDER — DEXTROAMPHETAMINE SACCHARATE, AMPHETAMINE ASPARTATE MONOHYDRATE, DEXTROAMPHETAMINE SULFATE AND AMPHETAMINE SULFATE 5; 5; 5; 5 MG/1; MG/1; MG/1; MG/1
20 CAPSULE, EXTENDED RELEASE ORAL 2 TIMES DAILY
Qty: 60 CAPSULE | Refills: 0 | Status: SHIPPED | OUTPATIENT
Start: 2021-09-28 | End: 2021-10-25 | Stop reason: SDUPTHER

## 2021-09-29 ENCOUNTER — OFFICE VISIT (OUTPATIENT)
Dept: FAMILY MEDICINE CLINIC | Facility: CLINIC | Age: 51
End: 2021-09-29

## 2021-09-29 VITALS
HEART RATE: 78 BPM | SYSTOLIC BLOOD PRESSURE: 110 MMHG | DIASTOLIC BLOOD PRESSURE: 80 MMHG | RESPIRATION RATE: 18 BRPM | OXYGEN SATURATION: 97 % | TEMPERATURE: 97.1 F

## 2021-09-29 DIAGNOSIS — F11.20 OPIOID DEPENDENCE, UNCOMPLICATED (HCC): ICD-10-CM

## 2021-09-29 DIAGNOSIS — J45.20 MILD INTERMITTENT ASTHMA WITHOUT COMPLICATION: ICD-10-CM

## 2021-09-29 DIAGNOSIS — E11.9 TYPE 2 DIABETES MELLITUS WITHOUT COMPLICATION, WITH LONG-TERM CURRENT USE OF INSULIN (HCC): Primary | ICD-10-CM

## 2021-09-29 DIAGNOSIS — G89.4 CHRONIC PAIN SYNDROME: ICD-10-CM

## 2021-09-29 DIAGNOSIS — F11.90 CHRONIC NARCOTIC USE: ICD-10-CM

## 2021-09-29 DIAGNOSIS — N17.9 ACUTE RENAL FAILURE SUPERIMPOSED ON STAGE 3B CHRONIC KIDNEY DISEASE, UNSPECIFIED ACUTE RENAL FAILURE TYPE (HCC): ICD-10-CM

## 2021-09-29 DIAGNOSIS — Z79.4 TYPE 2 DIABETES MELLITUS WITHOUT COMPLICATION, WITH LONG-TERM CURRENT USE OF INSULIN (HCC): Primary | ICD-10-CM

## 2021-09-29 DIAGNOSIS — R30.0 DYSURIA: ICD-10-CM

## 2021-09-29 DIAGNOSIS — N18.32 ACUTE RENAL FAILURE SUPERIMPOSED ON STAGE 3B CHRONIC KIDNEY DISEASE, UNSPECIFIED ACUTE RENAL FAILURE TYPE (HCC): ICD-10-CM

## 2021-09-29 DIAGNOSIS — G62.9 NEUROPATHY: ICD-10-CM

## 2021-09-29 LAB
SL AMB  POCT GLUCOSE, UA: NORMAL
SL AMB LEUKOCYTE ESTERASE,UA: NEGATIVE
SL AMB POCT BILIRUBIN,UA: NEGATIVE
SL AMB POCT BLOOD,UA: NEGATIVE
SL AMB POCT CLARITY,UA: NORMAL
SL AMB POCT COLOR,UA: YELLOW
SL AMB POCT HEMOGLOBIN AIC: 5.7 (ref ?–6.5)
SL AMB POCT KETONES,UA: NEGATIVE
SL AMB POCT NITRITE,UA: NEGATIVE
SL AMB POCT PH,UA: 5
SL AMB POCT SPECIFIC GRAVITY,UA: 1.02
SL AMB POCT URINE PROTEIN: 30
SL AMB POCT UROBILINOGEN: NORMAL

## 2021-09-29 PROCEDURE — 99214 OFFICE O/P EST MOD 30 MIN: CPT | Performed by: PHYSICIAN ASSISTANT

## 2021-09-29 PROCEDURE — 3044F HG A1C LEVEL LT 7.0%: CPT | Performed by: INTERNAL MEDICINE

## 2021-09-29 PROCEDURE — 3079F DIAST BP 80-89 MM HG: CPT | Performed by: PHYSICIAN ASSISTANT

## 2021-09-29 PROCEDURE — 80307 DRUG TEST PRSMV CHEM ANLYZR: CPT | Performed by: PHYSICIAN ASSISTANT

## 2021-09-29 PROCEDURE — 3044F HG A1C LEVEL LT 7.0%: CPT | Performed by: PHYSICIAN ASSISTANT

## 2021-09-29 PROCEDURE — 81002 URINALYSIS NONAUTO W/O SCOPE: CPT | Performed by: PHYSICIAN ASSISTANT

## 2021-09-29 PROCEDURE — 3074F SYST BP LT 130 MM HG: CPT | Performed by: PHYSICIAN ASSISTANT

## 2021-09-29 PROCEDURE — 83036 HEMOGLOBIN GLYCOSYLATED A1C: CPT | Performed by: PHYSICIAN ASSISTANT

## 2021-09-29 PROCEDURE — 80365 DRUG SCREENING OXYCODONE: CPT | Performed by: PHYSICIAN ASSISTANT

## 2021-09-29 RX ORDER — GABAPENTIN 300 MG/1
CAPSULE ORAL
Qty: 120 CAPSULE | Refills: 1 | Status: SHIPPED | OUTPATIENT
Start: 2021-09-29 | End: 2021-11-11 | Stop reason: SINTOL

## 2021-09-29 RX ORDER — ALBUTEROL SULFATE 2.5 MG/3ML
2.5 SOLUTION RESPIRATORY (INHALATION) EVERY 4 HOURS PRN
Qty: 180 ML | Refills: 3 | Status: SHIPPED | OUTPATIENT
Start: 2021-09-29 | End: 2021-12-13 | Stop reason: HOSPADM

## 2021-10-05 ENCOUNTER — TELEPHONE (OUTPATIENT)
Dept: FAMILY MEDICINE CLINIC | Facility: CLINIC | Age: 51
End: 2021-10-05

## 2021-10-05 DIAGNOSIS — R10.9 INTRACTABLE ABDOMINAL PAIN: ICD-10-CM

## 2021-10-05 DIAGNOSIS — G89.4 CHRONIC PAIN SYNDROME: ICD-10-CM

## 2021-10-05 DIAGNOSIS — M31.31 GRANULOMATOSIS WITH POLYANGIITIS WITH RENAL INVOLVEMENT (HCC): Primary | Chronic | ICD-10-CM

## 2021-10-05 DIAGNOSIS — K31.84 GASTROPARESIS: ICD-10-CM

## 2021-10-05 DIAGNOSIS — N17.9 ACUTE RENAL FAILURE SUPERIMPOSED ON STAGE 3B CHRONIC KIDNEY DISEASE, UNSPECIFIED ACUTE RENAL FAILURE TYPE (HCC): ICD-10-CM

## 2021-10-05 DIAGNOSIS — M31.31 GRANULOMATOSIS WITH POLYANGIITIS WITH RENAL INVOLVEMENT (HCC): Chronic | ICD-10-CM

## 2021-10-05 DIAGNOSIS — N18.32 ACUTE RENAL FAILURE SUPERIMPOSED ON STAGE 3B CHRONIC KIDNEY DISEASE, UNSPECIFIED ACUTE RENAL FAILURE TYPE (HCC): ICD-10-CM

## 2021-10-05 PROCEDURE — 3066F NEPHROPATHY DOC TX: CPT | Performed by: FAMILY MEDICINE

## 2021-10-05 RX ORDER — OXYCODONE HYDROCHLORIDE 10 MG/1
10 TABLET ORAL EVERY 4 HOURS PRN
Refills: 0 | Status: CANCELLED | OUTPATIENT
Start: 2021-10-05

## 2021-10-05 RX ORDER — OXYCODONE HYDROCHLORIDE 10 MG/1
10 TABLET ORAL EVERY 4 HOURS PRN
Qty: 180 TABLET | Refills: 0 | Status: SHIPPED | OUTPATIENT
Start: 2021-10-05 | End: 2021-10-30 | Stop reason: SDUPTHER

## 2021-10-06 ENCOUNTER — TELEPHONE (OUTPATIENT)
Dept: NEPHROLOGY | Facility: CLINIC | Age: 51
End: 2021-10-06

## 2021-10-06 LAB
AMPHETAMINES UR QL SCN: POSITIVE
BARBITURATES UR QL SCN: NEGATIVE NG/ML
BENZODIAZ UR QL: NEGATIVE NG/ML
BZE UR QL: NEGATIVE NG/ML
CANNABINOIDS UR QL SCN: POSITIVE
METHADONE UR QL SCN: NEGATIVE NG/ML
OPIATES UR QL: NEGATIVE NG/ML
PCP UR QL: NEGATIVE NG/ML
PROPOXYPH UR QL SCN: NEGATIVE NG/ML

## 2021-10-07 LAB
OXYCODONE UR QL CFM: 313 NG/ML
OXYCODONE+OXYMORPHONE SERPLBLD QL SCN: POSITIVE
OXYCODONE+OXYMORPHONE UR QL SCN: NORMAL NG/ML
OXYCODONE+OXYMORPHONE UR QL SCN: POSITIVE
OXYMORPHONE UR QL CFM: NEGATIVE

## 2021-10-12 ENCOUNTER — HOSPITAL ENCOUNTER (OUTPATIENT)
Dept: INFUSION CENTER | Facility: HOSPITAL | Age: 51
Discharge: HOME | End: 2021-10-12
Attending: INTERNAL MEDICINE
Payer: COMMERCIAL

## 2021-10-12 VITALS
SYSTOLIC BLOOD PRESSURE: 135 MMHG | TEMPERATURE: 97.4 F | RESPIRATION RATE: 18 BRPM | HEART RATE: 77 BPM | DIASTOLIC BLOOD PRESSURE: 78 MMHG

## 2021-10-12 DIAGNOSIS — N18.32 ACUTE RENAL FAILURE SUPERIMPOSED ON STAGE 3B CHRONIC KIDNEY DISEASE, UNSPECIFIED ACUTE RENAL FAILURE TYPE (HCC): ICD-10-CM

## 2021-10-12 DIAGNOSIS — N17.9 ACUTE RENAL FAILURE SUPERIMPOSED ON STAGE 3B CHRONIC KIDNEY DISEASE, UNSPECIFIED ACUTE RENAL FAILURE TYPE (HCC): ICD-10-CM

## 2021-10-12 DIAGNOSIS — K31.84 GASTROPARESIS: Primary | ICD-10-CM

## 2021-10-12 DIAGNOSIS — R10.9 INTRACTABLE ABDOMINAL PAIN: ICD-10-CM

## 2021-10-12 PROCEDURE — 96360 HYDRATION IV INFUSION INIT: CPT

## 2021-10-12 PROCEDURE — 96361 HYDRATE IV INFUSION ADD-ON: CPT

## 2021-10-12 RX ADMIN — SODIUM CHLORIDE, SODIUM LACTATE, POTASSIUM CHLORIDE, AND CALCIUM CHLORIDE 1000 ML: .6; .31; .03; .02 INJECTION, SOLUTION INTRAVENOUS at 09:51

## 2021-10-14 ENCOUNTER — TELEPHONE (OUTPATIENT)
Dept: FAMILY MEDICINE CLINIC | Facility: CLINIC | Age: 51
End: 2021-10-14

## 2021-10-14 ENCOUNTER — OFFICE VISIT (OUTPATIENT)
Dept: FAMILY MEDICINE CLINIC | Facility: CLINIC | Age: 51
End: 2021-10-14

## 2021-10-14 VITALS
RESPIRATION RATE: 18 BRPM | HEART RATE: 75 BPM | TEMPERATURE: 97.6 F | SYSTOLIC BLOOD PRESSURE: 126 MMHG | DIASTOLIC BLOOD PRESSURE: 82 MMHG | OXYGEN SATURATION: 99 %

## 2021-10-14 DIAGNOSIS — F25.9 SCHIZO-AFFECTIVE SCHIZOPHRENIA (HCC): ICD-10-CM

## 2021-10-14 DIAGNOSIS — R56.9 POSTICTAL STATE (HCC): ICD-10-CM

## 2021-10-14 DIAGNOSIS — M25.511 CHRONIC RIGHT SHOULDER PAIN: Primary | ICD-10-CM

## 2021-10-14 DIAGNOSIS — K86.1 CHRONIC PANCREATITIS, UNSPECIFIED PANCREATITIS TYPE (HCC): ICD-10-CM

## 2021-10-14 DIAGNOSIS — G89.29 CHRONIC RIGHT SHOULDER PAIN: Primary | ICD-10-CM

## 2021-10-14 PROCEDURE — 1036F TOBACCO NON-USER: CPT | Performed by: FAMILY MEDICINE

## 2021-10-14 PROCEDURE — 3074F SYST BP LT 130 MM HG: CPT | Performed by: FAMILY MEDICINE

## 2021-10-14 PROCEDURE — 99213 OFFICE O/P EST LOW 20 MIN: CPT | Performed by: FAMILY MEDICINE

## 2021-10-14 PROCEDURE — 3079F DIAST BP 80-89 MM HG: CPT | Performed by: FAMILY MEDICINE

## 2021-10-18 DIAGNOSIS — J30.1 SEASONAL ALLERGIC RHINITIS DUE TO POLLEN: ICD-10-CM

## 2021-10-19 ENCOUNTER — TELEPHONE (OUTPATIENT)
Dept: FAMILY MEDICINE CLINIC | Facility: CLINIC | Age: 51
End: 2021-10-19

## 2021-10-19 ENCOUNTER — HOSPITAL ENCOUNTER (OUTPATIENT)
Dept: INFUSION CENTER | Facility: HOSPITAL | Age: 51
Discharge: HOME | End: 2021-10-19
Attending: INTERNAL MEDICINE

## 2021-10-19 DIAGNOSIS — G89.29 CHRONIC RIGHT SHOULDER PAIN: Primary | ICD-10-CM

## 2021-10-19 DIAGNOSIS — M25.511 CHRONIC RIGHT SHOULDER PAIN: Primary | ICD-10-CM

## 2021-10-20 ENCOUNTER — TELEPHONE (OUTPATIENT)
Dept: FAMILY MEDICINE CLINIC | Facility: CLINIC | Age: 51
End: 2021-10-20

## 2021-10-20 RX ORDER — MONTELUKAST SODIUM 10 MG/1
TABLET ORAL
Qty: 90 TABLET | Refills: 0 | Status: SHIPPED | OUTPATIENT
Start: 2021-10-20 | End: 2022-01-17

## 2021-10-20 NOTE — TELEPHONE ENCOUNTER
Adela Barone called for this Pt stating she has faxed over a request for a wheelchair and she needs your signature to approve it

## 2021-10-22 DIAGNOSIS — N18.32 ACUTE RENAL FAILURE SUPERIMPOSED ON STAGE 3B CHRONIC KIDNEY DISEASE, UNSPECIFIED ACUTE RENAL FAILURE TYPE (HCC): ICD-10-CM

## 2021-10-22 DIAGNOSIS — K31.84 GASTROPARESIS: Primary | ICD-10-CM

## 2021-10-22 DIAGNOSIS — N17.9 ACUTE RENAL FAILURE SUPERIMPOSED ON STAGE 3B CHRONIC KIDNEY DISEASE, UNSPECIFIED ACUTE RENAL FAILURE TYPE (HCC): ICD-10-CM

## 2021-10-22 DIAGNOSIS — R10.9 INTRACTABLE ABDOMINAL PAIN: ICD-10-CM

## 2021-10-23 ENCOUNTER — DOCUMENTATION (OUTPATIENT)
Dept: SOCIAL WORK | Facility: HOSPITAL | Age: 51
End: 2021-10-23

## 2021-10-26 ENCOUNTER — HOSPITAL ENCOUNTER (OUTPATIENT)
Dept: INFUSION CENTER | Facility: HOSPITAL | Age: 51
Discharge: HOME | End: 2021-10-26
Attending: INTERNAL MEDICINE
Payer: COMMERCIAL

## 2021-10-26 VITALS
SYSTOLIC BLOOD PRESSURE: 121 MMHG | DIASTOLIC BLOOD PRESSURE: 83 MMHG | RESPIRATION RATE: 18 BRPM | HEART RATE: 72 BPM | TEMPERATURE: 96.7 F

## 2021-10-26 DIAGNOSIS — N17.9 ACUTE RENAL FAILURE SUPERIMPOSED ON STAGE 3B CHRONIC KIDNEY DISEASE, UNSPECIFIED ACUTE RENAL FAILURE TYPE (HCC): ICD-10-CM

## 2021-10-26 DIAGNOSIS — R10.9 INTRACTABLE ABDOMINAL PAIN: ICD-10-CM

## 2021-10-26 DIAGNOSIS — N18.32 ACUTE RENAL FAILURE SUPERIMPOSED ON STAGE 3B CHRONIC KIDNEY DISEASE, UNSPECIFIED ACUTE RENAL FAILURE TYPE (HCC): ICD-10-CM

## 2021-10-26 DIAGNOSIS — K31.84 GASTROPARESIS: Primary | ICD-10-CM

## 2021-10-26 PROCEDURE — 96360 HYDRATION IV INFUSION INIT: CPT

## 2021-10-26 PROCEDURE — 96361 HYDRATE IV INFUSION ADD-ON: CPT

## 2021-10-26 RX ADMIN — SODIUM CHLORIDE, SODIUM LACTATE, POTASSIUM CHLORIDE, AND CALCIUM CHLORIDE 1000 ML: .6; .31; .03; .02 INJECTION, SOLUTION INTRAVENOUS at 09:50

## 2021-10-28 NOTE — TELEPHONE ENCOUNTER
Form was completed and faxed by Danika Dean on 10/26/21  Please see encounter from 10/14/21  Thanks!

## 2021-10-30 DIAGNOSIS — Z79.4 TYPE 2 DIABETES MELLITUS WITHOUT COMPLICATION, WITH LONG-TERM CURRENT USE OF INSULIN (HCC): ICD-10-CM

## 2021-10-30 DIAGNOSIS — M31.31 GRANULOMATOSIS WITH POLYANGIITIS WITH RENAL INVOLVEMENT (HCC): Chronic | ICD-10-CM

## 2021-10-30 DIAGNOSIS — K31.84 GASTROPARESIS: ICD-10-CM

## 2021-10-30 DIAGNOSIS — E11.9 TYPE 2 DIABETES MELLITUS WITHOUT COMPLICATION, WITH LONG-TERM CURRENT USE OF INSULIN (HCC): ICD-10-CM

## 2021-10-30 DIAGNOSIS — G89.4 CHRONIC PAIN SYNDROME: ICD-10-CM

## 2021-11-02 ENCOUNTER — HOSPITAL ENCOUNTER (OUTPATIENT)
Dept: INFUSION CENTER | Facility: HOSPITAL | Age: 51
Discharge: HOME | End: 2021-11-02
Attending: INTERNAL MEDICINE

## 2021-11-02 DIAGNOSIS — M25.511 CHRONIC RIGHT SHOULDER PAIN: Primary | ICD-10-CM

## 2021-11-02 DIAGNOSIS — G89.29 CHRONIC RIGHT SHOULDER PAIN: Primary | ICD-10-CM

## 2021-11-02 RX ORDER — INSULIN ASPART 100 [IU]/ML
6 INJECTION, SOLUTION INTRAVENOUS; SUBCUTANEOUS
Qty: 15 ML | Refills: 0 | Status: SHIPPED | OUTPATIENT
Start: 2021-11-02 | End: 2021-12-27 | Stop reason: SDUPTHER

## 2021-11-02 RX ORDER — BLOOD-GLUCOSE METER
KIT MISCELLANEOUS
Qty: 100 STRIP | Refills: 0 | Status: SHIPPED | OUTPATIENT
Start: 2021-11-02 | End: 2021-12-27 | Stop reason: SDUPTHER

## 2021-11-02 RX ORDER — ONDANSETRON 4 MG/1
4 TABLET, FILM COATED ORAL EVERY 8 HOURS PRN
Qty: 30 TABLET | Refills: 0 | Status: SHIPPED | OUTPATIENT
Start: 2021-11-02 | End: 2022-01-25 | Stop reason: SDUPTHER

## 2021-11-02 RX ORDER — OXYCODONE HYDROCHLORIDE 10 MG/1
10 TABLET ORAL EVERY 4 HOURS PRN
Qty: 180 TABLET | Refills: 0 | Status: SHIPPED | OUTPATIENT
Start: 2021-11-02 | End: 2021-12-01 | Stop reason: SDUPTHER

## 2021-11-05 ENCOUNTER — OFFICE VISIT (OUTPATIENT)
Dept: OBGYN CLINIC | Facility: CLINIC | Age: 51
End: 2021-11-05
Payer: COMMERCIAL

## 2021-11-05 VITALS — SYSTOLIC BLOOD PRESSURE: 110 MMHG | HEIGHT: 62 IN | DIASTOLIC BLOOD PRESSURE: 80 MMHG | BODY MASS INDEX: 27.25 KG/M2

## 2021-11-05 DIAGNOSIS — M54.12 RIGHT CERVICAL RADICULOPATHY: Primary | ICD-10-CM

## 2021-11-05 PROCEDURE — 1036F TOBACCO NON-USER: CPT | Performed by: PHYSICIAN ASSISTANT

## 2021-11-05 PROCEDURE — 99214 OFFICE O/P EST MOD 30 MIN: CPT | Performed by: PHYSICIAN ASSISTANT

## 2021-11-05 RX ORDER — METHYLPREDNISOLONE 4 MG/1
TABLET ORAL
Qty: 21 TABLET | Refills: 0 | Status: SHIPPED | OUTPATIENT
Start: 2021-11-05 | End: 2021-12-13 | Stop reason: HOSPADM

## 2021-11-09 ENCOUNTER — OFFICE VISIT (OUTPATIENT)
Dept: FAMILY MEDICINE CLINIC | Facility: CLINIC | Age: 51
End: 2021-11-09

## 2021-11-09 ENCOUNTER — HOSPITAL ENCOUNTER (OUTPATIENT)
Dept: INFUSION CENTER | Facility: HOSPITAL | Age: 51
Discharge: HOME | End: 2021-11-09
Attending: INTERNAL MEDICINE
Payer: COMMERCIAL

## 2021-11-09 VITALS
TEMPERATURE: 97.3 F | RESPIRATION RATE: 18 BRPM | DIASTOLIC BLOOD PRESSURE: 70 MMHG | BODY MASS INDEX: 27.25 KG/M2 | HEART RATE: 67 BPM | OXYGEN SATURATION: 96 % | HEIGHT: 62 IN | SYSTOLIC BLOOD PRESSURE: 114 MMHG

## 2021-11-09 VITALS
OXYGEN SATURATION: 96 % | SYSTOLIC BLOOD PRESSURE: 113 MMHG | RESPIRATION RATE: 16 BRPM | TEMPERATURE: 96.9 F | HEART RATE: 75 BPM | DIASTOLIC BLOOD PRESSURE: 69 MMHG

## 2021-11-09 DIAGNOSIS — M54.12 RIGHT CERVICAL RADICULOPATHY: ICD-10-CM

## 2021-11-09 DIAGNOSIS — E78.2 MIXED HYPERLIPIDEMIA: ICD-10-CM

## 2021-11-09 DIAGNOSIS — N18.32 ACUTE RENAL FAILURE SUPERIMPOSED ON STAGE 3B CHRONIC KIDNEY DISEASE, UNSPECIFIED ACUTE RENAL FAILURE TYPE (HCC): ICD-10-CM

## 2021-11-09 DIAGNOSIS — R22.9 LUMP OF SKIN: Primary | ICD-10-CM

## 2021-11-09 DIAGNOSIS — N17.9 ACUTE RENAL FAILURE SUPERIMPOSED ON STAGE 3B CHRONIC KIDNEY DISEASE, UNSPECIFIED ACUTE RENAL FAILURE TYPE (HCC): ICD-10-CM

## 2021-11-09 DIAGNOSIS — R10.9 INTRACTABLE ABDOMINAL PAIN: ICD-10-CM

## 2021-11-09 DIAGNOSIS — K31.84 GASTROPARESIS: Primary | ICD-10-CM

## 2021-11-09 PROCEDURE — 96360 HYDRATION IV INFUSION INIT: CPT

## 2021-11-09 PROCEDURE — 3074F SYST BP LT 130 MM HG: CPT | Performed by: PHYSICIAN ASSISTANT

## 2021-11-09 PROCEDURE — 3066F NEPHROPATHY DOC TX: CPT | Performed by: PHYSICIAN ASSISTANT

## 2021-11-09 PROCEDURE — 99213 OFFICE O/P EST LOW 20 MIN: CPT | Performed by: PHYSICIAN ASSISTANT

## 2021-11-09 PROCEDURE — 96361 HYDRATE IV INFUSION ADD-ON: CPT

## 2021-11-09 PROCEDURE — 3078F DIAST BP <80 MM HG: CPT | Performed by: PHYSICIAN ASSISTANT

## 2021-11-09 PROCEDURE — 1036F TOBACCO NON-USER: CPT | Performed by: PHYSICIAN ASSISTANT

## 2021-11-09 RX ORDER — ATORVASTATIN CALCIUM 20 MG/1
TABLET, FILM COATED ORAL
Qty: 90 TABLET | Refills: 0 | Status: SHIPPED | OUTPATIENT
Start: 2021-11-09 | End: 2022-01-26 | Stop reason: ALTCHOICE

## 2021-11-09 RX ADMIN — SODIUM CHLORIDE, SODIUM LACTATE, POTASSIUM CHLORIDE, AND CALCIUM CHLORIDE 1000 ML: .6; .31; .03; .02 INJECTION, SOLUTION INTRAVENOUS at 09:49

## 2021-11-10 ENCOUNTER — TELEPHONE (OUTPATIENT)
Dept: FAMILY MEDICINE CLINIC | Facility: CLINIC | Age: 51
End: 2021-11-10

## 2021-11-15 ENCOUNTER — TELEPHONE (OUTPATIENT)
Dept: FAMILY MEDICINE CLINIC | Facility: CLINIC | Age: 51
End: 2021-11-15

## 2021-11-15 ENCOUNTER — TELEPHONE (OUTPATIENT)
Dept: PAIN MEDICINE | Facility: CLINIC | Age: 51
End: 2021-11-15

## 2021-11-15 ENCOUNTER — TELEPHONE (OUTPATIENT)
Dept: OBGYN CLINIC | Facility: HOSPITAL | Age: 51
End: 2021-11-15

## 2021-11-16 ENCOUNTER — TELEPHONE (OUTPATIENT)
Dept: OBGYN CLINIC | Facility: HOSPITAL | Age: 51
End: 2021-11-16

## 2021-11-16 ENCOUNTER — HOSPITAL ENCOUNTER (OUTPATIENT)
Dept: INFUSION CENTER | Facility: HOSPITAL | Age: 51
Discharge: HOME | End: 2021-11-16
Attending: INTERNAL MEDICINE

## 2021-11-17 ENCOUNTER — VBI (OUTPATIENT)
Dept: ADMINISTRATIVE | Facility: OTHER | Age: 51
End: 2021-11-17

## 2021-11-17 DIAGNOSIS — K59.00 CONSTIPATION, UNSPECIFIED CONSTIPATION TYPE: ICD-10-CM

## 2021-11-17 RX ORDER — DOCUSATE SODIUM 100 MG/1
100 CAPSULE, LIQUID FILLED ORAL 2 TIMES DAILY
Qty: 180 CAPSULE | Refills: 1 | Status: SHIPPED | OUTPATIENT
Start: 2021-11-17 | End: 2022-01-26 | Stop reason: ALTCHOICE

## 2021-11-18 ENCOUNTER — HOSPITAL ENCOUNTER (OUTPATIENT)
Dept: INFUSION CENTER | Facility: HOSPITAL | Age: 51
Discharge: HOME | End: 2021-11-18
Attending: INTERNAL MEDICINE
Payer: COMMERCIAL

## 2021-11-18 VITALS
SYSTOLIC BLOOD PRESSURE: 114 MMHG | RESPIRATION RATE: 18 BRPM | TEMPERATURE: 97.6 F | DIASTOLIC BLOOD PRESSURE: 75 MMHG | HEART RATE: 70 BPM

## 2021-11-18 DIAGNOSIS — K31.84 GASTROPARESIS: Primary | ICD-10-CM

## 2021-11-18 DIAGNOSIS — N18.32 ACUTE RENAL FAILURE SUPERIMPOSED ON STAGE 3B CHRONIC KIDNEY DISEASE, UNSPECIFIED ACUTE RENAL FAILURE TYPE (HCC): ICD-10-CM

## 2021-11-18 DIAGNOSIS — N17.9 ACUTE RENAL FAILURE SUPERIMPOSED ON STAGE 3B CHRONIC KIDNEY DISEASE, UNSPECIFIED ACUTE RENAL FAILURE TYPE (HCC): ICD-10-CM

## 2021-11-18 DIAGNOSIS — R10.9 INTRACTABLE ABDOMINAL PAIN: ICD-10-CM

## 2021-11-18 PROCEDURE — 96361 HYDRATE IV INFUSION ADD-ON: CPT

## 2021-11-18 PROCEDURE — 96360 HYDRATION IV INFUSION INIT: CPT

## 2021-11-18 RX ADMIN — SODIUM CHLORIDE, SODIUM LACTATE, POTASSIUM CHLORIDE, AND CALCIUM CHLORIDE 1000 ML: .6; .31; .03; .02 INJECTION, SOLUTION INTRAVENOUS at 09:50

## 2021-11-22 DIAGNOSIS — M54.12 RADICULOPATHY, CERVICAL REGION: Primary | ICD-10-CM

## 2021-11-22 DIAGNOSIS — F31.9 BIPOLAR 1 DISORDER (HCC): ICD-10-CM

## 2021-11-22 RX ORDER — DEXTROAMPHETAMINE SACCHARATE, AMPHETAMINE ASPARTATE MONOHYDRATE, DEXTROAMPHETAMINE SULFATE AND AMPHETAMINE SULFATE 5; 5; 5; 5 MG/1; MG/1; MG/1; MG/1
20 CAPSULE, EXTENDED RELEASE ORAL 2 TIMES DAILY
Qty: 60 CAPSULE | Refills: 0 | Status: SHIPPED | OUTPATIENT
Start: 2021-11-22 | End: 2021-12-21 | Stop reason: SDUPTHER

## 2021-11-26 ENCOUNTER — HOSPITAL ENCOUNTER (OUTPATIENT)
Facility: HOSPITAL | Age: 51
Setting detail: OBSERVATION
Discharge: LEFT BEFORE DISCHARGE | End: 2021-11-27
Attending: EMERGENCY MEDICINE | Admitting: STUDENT IN AN ORGANIZED HEALTH CARE EDUCATION/TRAINING PROGRAM
Payer: COMMERCIAL

## 2021-11-26 ENCOUNTER — TELEPHONE (OUTPATIENT)
Dept: OBGYN CLINIC | Facility: HOSPITAL | Age: 51
End: 2021-11-26

## 2021-11-26 ENCOUNTER — TELEPHONE (OUTPATIENT)
Dept: FAMILY MEDICINE CLINIC | Facility: CLINIC | Age: 51
End: 2021-11-26

## 2021-11-26 ENCOUNTER — APPOINTMENT (EMERGENCY)
Dept: CT IMAGING | Facility: HOSPITAL | Age: 51
End: 2021-11-26
Payer: COMMERCIAL

## 2021-11-26 DIAGNOSIS — R20.0 RIGHT ARM NUMBNESS: Primary | ICD-10-CM

## 2021-11-26 DIAGNOSIS — M54.12 CERVICAL RADICULOPATHY: ICD-10-CM

## 2021-11-26 DIAGNOSIS — R29.898 RIGHT HAND WEAKNESS: ICD-10-CM

## 2021-11-26 PROCEDURE — 93005 ELECTROCARDIOGRAM TRACING: CPT

## 2021-11-26 PROCEDURE — 99285 EMERGENCY DEPT VISIT HI MDM: CPT | Performed by: EMERGENCY MEDICINE

## 2021-11-26 PROCEDURE — 99285 EMERGENCY DEPT VISIT HI MDM: CPT

## 2021-11-26 PROCEDURE — 51798 US URINE CAPACITY MEASURE: CPT

## 2021-11-26 PROCEDURE — 72125 CT NECK SPINE W/O DYE: CPT

## 2021-11-26 PROCEDURE — 72131 CT LUMBAR SPINE W/O DYE: CPT

## 2021-11-26 PROCEDURE — 72128 CT CHEST SPINE W/O DYE: CPT

## 2021-11-27 VITALS
DIASTOLIC BLOOD PRESSURE: 82 MMHG | SYSTOLIC BLOOD PRESSURE: 161 MMHG | HEART RATE: 60 BPM | OXYGEN SATURATION: 98 % | RESPIRATION RATE: 18 BRPM | TEMPERATURE: 98.3 F
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
## 2021-11-27 PROBLEM — R00.1 SINUS BRADYCARDIA: Status: ACTIVE | Noted: 2021-11-27

## 2021-11-27 PROBLEM — N18.9 CKD (CHRONIC KIDNEY DISEASE): Chronic | Status: ACTIVE | Noted: 2021-11-27
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
## 2021-11-27 PROBLEM — R20.0 RIGHT ARM NUMBNESS: Status: ACTIVE | Noted: 2021-11-27

## 2021-11-27 PROBLEM — M54.12 CERVICAL RADICULOPATHY: Chronic | Status: ACTIVE | Noted: 2021-11-27

## 2021-11-27 LAB
2HR DELTA HS TROPONIN: 0 NG/L
4HR DELTA HS TROPONIN: 3 NG/L
ALBUMIN SERPL BCP-MCNC: 3.5 G/DL (ref 3.5–5)
ALP SERPL-CCNC: 132 U/L (ref 46–116)
ALT SERPL W P-5'-P-CCNC: 24 U/L (ref 12–78)
ANION GAP SERPL CALCULATED.3IONS-SCNC: 11 MMOL/L (ref 4–13)
ANION GAP SERPL CALCULATED.3IONS-SCNC: 11 MMOL/L (ref 4–13)
AST SERPL W P-5'-P-CCNC: 20 U/L (ref 5–45)
ATRIAL RATE: 48 BPM
ATRIAL RATE: 55 BPM
BASOPHILS # BLD AUTO: 0.02 THOUSANDS/ΜL (ref 0–0.1)
BASOPHILS # BLD AUTO: 0.03 THOUSANDS/ΜL (ref 0–0.1)
BASOPHILS NFR BLD AUTO: 0 % (ref 0–1)
BASOPHILS NFR BLD AUTO: 0 % (ref 0–1)
BILIRUB SERPL-MCNC: 0.12 MG/DL (ref 0.2–1)
BUN SERPL-MCNC: 42 MG/DL (ref 5–25)
BUN SERPL-MCNC: 50 MG/DL (ref 5–25)
CALCIUM SERPL-MCNC: 8.3 MG/DL (ref 8.3–10.1)
CALCIUM SERPL-MCNC: 9 MG/DL (ref 8.3–10.1)
CARDIAC TROPONIN I PNL SERPL HS: 3 NG/L
CARDIAC TROPONIN I PNL SERPL HS: 3 NG/L
CARDIAC TROPONIN I PNL SERPL HS: 6 NG/L
CHLORIDE SERPL-SCNC: 106 MMOL/L (ref 100–108)
CHLORIDE SERPL-SCNC: 108 MMOL/L (ref 100–108)
CO2 SERPL-SCNC: 21 MMOL/L (ref 21–32)
CO2 SERPL-SCNC: 23 MMOL/L (ref 21–32)
CREAT SERPL-MCNC: 1.87 MG/DL (ref 0.6–1.3)
CREAT SERPL-MCNC: 2.17 MG/DL (ref 0.6–1.3)
EOSINOPHIL # BLD AUTO: 0.06 THOUSAND/ΜL (ref 0–0.61)
EOSINOPHIL # BLD AUTO: 0.07 THOUSAND/ΜL (ref 0–0.61)
EOSINOPHIL NFR BLD AUTO: 1 % (ref 0–6)
EOSINOPHIL NFR BLD AUTO: 1 % (ref 0–6)
ERYTHROCYTE [DISTWIDTH] IN BLOOD BY AUTOMATED COUNT: 12.1 % (ref 11.6–15.1)
ERYTHROCYTE [DISTWIDTH] IN BLOOD BY AUTOMATED COUNT: 12.4 % (ref 11.6–15.1)
GFR SERPL CREATININE-BSD FRML MDRD: 26 ML/MIN/1.73SQ M
GFR SERPL CREATININE-BSD FRML MDRD: 31 ML/MIN/1.73SQ M
GLUCOSE P FAST SERPL-MCNC: 100 MG/DL (ref 65–99)
GLUCOSE SERPL-MCNC: 100 MG/DL (ref 65–140)
GLUCOSE SERPL-MCNC: 91 MG/DL (ref 65–140)
HCT VFR BLD AUTO: 38.1 % (ref 34.8–46.1)
HCT VFR BLD AUTO: 39.4 % (ref 34.8–46.1)
HGB BLD-MCNC: 12.5 G/DL (ref 11.5–15.4)
HGB BLD-MCNC: 12.7 G/DL (ref 11.5–15.4)
IMM GRANULOCYTES # BLD AUTO: 0.01 THOUSAND/UL (ref 0–0.2)
IMM GRANULOCYTES # BLD AUTO: 0.02 THOUSAND/UL (ref 0–0.2)
IMM GRANULOCYTES NFR BLD AUTO: 0 % (ref 0–2)
IMM GRANULOCYTES NFR BLD AUTO: 0 % (ref 0–2)
LYMPHOCYTES # BLD AUTO: 2.12 THOUSANDS/ΜL (ref 0.6–4.47)
LYMPHOCYTES # BLD AUTO: 2.37 THOUSANDS/ΜL (ref 0.6–4.47)
LYMPHOCYTES NFR BLD AUTO: 35 % (ref 14–44)
LYMPHOCYTES NFR BLD AUTO: 38 % (ref 14–44)
MCH RBC QN AUTO: 29.8 PG (ref 26.8–34.3)
MCH RBC QN AUTO: 31.4 PG (ref 26.8–34.3)
MCHC RBC AUTO-ENTMCNC: 31.7 G/DL (ref 31.4–37.4)
MCHC RBC AUTO-ENTMCNC: 33.3 G/DL (ref 31.4–37.4)
MCV RBC AUTO: 94 FL (ref 82–98)
MCV RBC AUTO: 94 FL (ref 82–98)
MONOCYTES # BLD AUTO: 0.56 THOUSAND/ΜL (ref 0.17–1.22)
MONOCYTES # BLD AUTO: 0.73 THOUSAND/ΜL (ref 0.17–1.22)
MONOCYTES NFR BLD AUTO: 10 % (ref 4–12)
MONOCYTES NFR BLD AUTO: 11 % (ref 4–12)
NEUTROPHILS # BLD AUTO: 2.85 THOUSANDS/ΜL (ref 1.85–7.62)
NEUTROPHILS # BLD AUTO: 3.48 THOUSANDS/ΜL (ref 1.85–7.62)
NEUTS SEG NFR BLD AUTO: 51 % (ref 43–75)
NEUTS SEG NFR BLD AUTO: 53 % (ref 43–75)
NRBC BLD AUTO-RTO: 0 /100 WBCS
NRBC BLD AUTO-RTO: 0 /100 WBCS
P AXIS: 70 DEGREES
P AXIS: 77 DEGREES
PLATELET # BLD AUTO: 209 THOUSANDS/UL (ref 149–390)
PLATELET # BLD AUTO: 238 THOUSANDS/UL (ref 149–390)
PLATELET # BLD AUTO: 238 THOUSANDS/UL (ref 149–390)
PMV BLD AUTO: 10 FL (ref 8.9–12.7)
PMV BLD AUTO: 9.8 FL (ref 8.9–12.7)
PMV BLD AUTO: 9.8 FL (ref 8.9–12.7)
POTASSIUM SERPL-SCNC: 4.5 MMOL/L (ref 3.5–5.3)
POTASSIUM SERPL-SCNC: 4.7 MMOL/L (ref 3.5–5.3)
PR INTERVAL: 142 MS
PR INTERVAL: 150 MS
PROT SERPL-MCNC: 7.3 G/DL (ref 6.4–8.2)
QRS AXIS: -17 DEGREES
QRS AXIS: -9 DEGREES
QRSD INTERVAL: 68 MS
QRSD INTERVAL: 68 MS
QT INTERVAL: 430 MS
QT INTERVAL: 468 MS
QTC INTERVAL: 411 MS
QTC INTERVAL: 418 MS
RBC # BLD AUTO: 4.04 MILLION/UL (ref 3.81–5.12)
RBC # BLD AUTO: 4.19 MILLION/UL (ref 3.81–5.12)
SODIUM SERPL-SCNC: 140 MMOL/L (ref 136–145)
SODIUM SERPL-SCNC: 140 MMOL/L (ref 136–145)
T WAVE AXIS: 48 DEGREES
T WAVE AXIS: 6 DEGREES
VENTRICULAR RATE: 48 BPM
VENTRICULAR RATE: 55 BPM
WBC # BLD AUTO: 5.63 THOUSAND/UL (ref 4.31–10.16)
WBC # BLD AUTO: 6.69 THOUSAND/UL (ref 4.31–10.16)

## 2021-11-27 PROCEDURE — 93010 ELECTROCARDIOGRAM REPORT: CPT | Performed by: INTERNAL MEDICINE

## 2021-11-27 PROCEDURE — 36415 COLL VENOUS BLD VENIPUNCTURE: CPT | Performed by: EMERGENCY MEDICINE

## 2021-11-27 PROCEDURE — 85049 AUTOMATED PLATELET COUNT: CPT | Performed by: NURSE PRACTITIONER

## 2021-11-27 PROCEDURE — 99220 PR INITIAL OBSERVATION CARE/DAY 70 MINUTES: CPT | Performed by: NURSE PRACTITIONER

## 2021-11-27 PROCEDURE — 84484 ASSAY OF TROPONIN QUANT: CPT | Performed by: EMERGENCY MEDICINE

## 2021-11-27 PROCEDURE — 80048 BASIC METABOLIC PNL TOTAL CA: CPT | Performed by: NURSE PRACTITIONER

## 2021-11-27 PROCEDURE — 93005 ELECTROCARDIOGRAM TRACING: CPT

## 2021-11-27 PROCEDURE — 85025 COMPLETE CBC W/AUTO DIFF WBC: CPT | Performed by: NURSE PRACTITIONER

## 2021-11-27 PROCEDURE — 85025 COMPLETE CBC W/AUTO DIFF WBC: CPT | Performed by: EMERGENCY MEDICINE

## 2021-11-27 PROCEDURE — 80053 COMPREHEN METABOLIC PANEL: CPT | Performed by: EMERGENCY MEDICINE

## 2021-11-27 RX ORDER — SIMETHICONE 80 MG
80 TABLET,CHEWABLE ORAL 4 TIMES DAILY PRN
Status: DISCONTINUED | OUTPATIENT
Start: 2021-11-27 | End: 2021-11-27 | Stop reason: HOSPADM

## 2021-11-27 RX ORDER — ACETAMINOPHEN 325 MG/1
650 TABLET ORAL EVERY 6 HOURS PRN
Status: DISCONTINUED | OUTPATIENT
Start: 2021-11-27 | End: 2021-11-27 | Stop reason: HOSPADM

## 2021-11-27 RX ORDER — INSULIN GLARGINE 100 [IU]/ML
10 INJECTION, SOLUTION SUBCUTANEOUS EVERY MORNING
Status: DISCONTINUED | OUTPATIENT
Start: 2021-11-27 | End: 2021-11-27 | Stop reason: HOSPADM

## 2021-11-27 RX ORDER — PANTOPRAZOLE SODIUM 40 MG/1
40 TABLET, DELAYED RELEASE ORAL
Status: DISCONTINUED | OUTPATIENT
Start: 2021-11-27 | End: 2021-11-27 | Stop reason: HOSPADM

## 2021-11-27 RX ORDER — DEXTROAMPHETAMINE SACCHARATE, AMPHETAMINE ASPARTATE, DEXTROAMPHETAMINE SULFATE AND AMPHETAMINE SULFATE 2.5; 2.5; 2.5; 2.5 MG/1; MG/1; MG/1; MG/1
20 TABLET ORAL
Status: DISCONTINUED | OUTPATIENT
Start: 2021-11-27 | End: 2021-11-27 | Stop reason: HOSPADM

## 2021-11-27 RX ORDER — BUPROPION HYDROCHLORIDE 150 MG/1
300 TABLET ORAL EVERY MORNING
Status: DISCONTINUED | OUTPATIENT
Start: 2021-11-27 | End: 2021-11-27 | Stop reason: HOSPADM

## 2021-11-27 RX ORDER — AMLODIPINE BESYLATE 5 MG/1
5 TABLET ORAL DAILY
Status: DISCONTINUED | OUTPATIENT
Start: 2021-11-27 | End: 2021-11-27 | Stop reason: HOSPADM

## 2021-11-27 RX ORDER — SCOLOPAMINE TRANSDERMAL SYSTEM 1 MG/1
1 PATCH, EXTENDED RELEASE TRANSDERMAL
Status: DISCONTINUED | OUTPATIENT
Start: 2021-11-27 | End: 2021-11-27 | Stop reason: HOSPADM

## 2021-11-27 RX ORDER — LUBIPROSTONE 8 UG/1
8 CAPSULE, GELATIN COATED ORAL 2 TIMES DAILY WITH MEALS
Status: DISCONTINUED | OUTPATIENT
Start: 2021-11-27 | End: 2021-11-27 | Stop reason: HOSPADM

## 2021-11-27 RX ORDER — SUCRALFATE 1 G/1
1 TABLET ORAL
Status: DISCONTINUED | OUTPATIENT
Start: 2021-11-27 | End: 2021-11-27 | Stop reason: HOSPADM

## 2021-11-27 RX ORDER — DOCUSATE SODIUM 100 MG/1
100 CAPSULE, LIQUID FILLED ORAL 2 TIMES DAILY
Status: DISCONTINUED | OUTPATIENT
Start: 2021-11-27 | End: 2021-11-27 | Stop reason: HOSPADM

## 2021-11-27 RX ORDER — ALBUTEROL SULFATE 90 UG/1
2 AEROSOL, METERED RESPIRATORY (INHALATION) EVERY 4 HOURS PRN
Status: DISCONTINUED | OUTPATIENT
Start: 2021-11-27 | End: 2021-11-27 | Stop reason: HOSPADM

## 2021-11-27 RX ORDER — ONDANSETRON 2 MG/ML
4 INJECTION INTRAMUSCULAR; INTRAVENOUS EVERY 6 HOURS PRN
Status: DISCONTINUED | OUTPATIENT
Start: 2021-11-27 | End: 2021-11-27 | Stop reason: HOSPADM

## 2021-11-27 RX ORDER — SODIUM CHLORIDE 9 MG/ML
100 INJECTION, SOLUTION INTRAVENOUS CONTINUOUS
Status: DISCONTINUED | OUTPATIENT
Start: 2021-11-27 | End: 2021-11-27 | Stop reason: HOSPADM

## 2021-11-27 RX ORDER — MONTELUKAST SODIUM 10 MG/1
10 TABLET ORAL
Status: DISCONTINUED | OUTPATIENT
Start: 2021-11-27 | End: 2021-11-27 | Stop reason: HOSPADM

## 2021-11-27 RX ORDER — OXYCODONE HYDROCHLORIDE 10 MG/1
10 TABLET ORAL EVERY 4 HOURS PRN
Status: DISCONTINUED | OUTPATIENT
Start: 2021-11-27 | End: 2021-11-27 | Stop reason: HOSPADM

## 2021-11-27 RX ORDER — HEPARIN SODIUM 5000 [USP'U]/ML
5000 INJECTION, SOLUTION INTRAVENOUS; SUBCUTANEOUS EVERY 8 HOURS SCHEDULED
Status: DISCONTINUED | OUTPATIENT
Start: 2021-11-27 | End: 2021-11-27 | Stop reason: HOSPADM

## 2021-11-27 RX ORDER — ATORVASTATIN CALCIUM 20 MG/1
20 TABLET, FILM COATED ORAL DAILY
Status: DISCONTINUED | OUTPATIENT
Start: 2021-11-27 | End: 2021-11-27 | Stop reason: HOSPADM

## 2021-11-27 RX ORDER — MAGNESIUM HYDROXIDE/ALUMINUM HYDROXICE/SIMETHICONE 120; 1200; 1200 MG/30ML; MG/30ML; MG/30ML
30 SUSPENSION ORAL EVERY 6 HOURS PRN
Status: DISCONTINUED | OUTPATIENT
Start: 2021-11-27 | End: 2021-11-27 | Stop reason: HOSPADM

## 2021-11-27 RX ADMIN — PANTOPRAZOLE SODIUM 40 MG: 40 TABLET, DELAYED RELEASE ORAL at 05:07

## 2021-11-27 RX ADMIN — OXYCODONE HYDROCHLORIDE 10 MG: 10 TABLET ORAL at 03:02

## 2021-11-27 RX ADMIN — SODIUM CHLORIDE 100 ML/HR: 0.9 INJECTION, SOLUTION INTRAVENOUS at 03:49

## 2021-11-27 RX ADMIN — SODIUM CHLORIDE 1000 ML: 0.9 INJECTION, SOLUTION INTRAVENOUS at 02:07

## 2021-11-27 RX ADMIN — HEPARIN SODIUM 5000 UNITS: 5000 INJECTION INTRAVENOUS; SUBCUTANEOUS at 05:07

## 2021-11-29 ENCOUNTER — HOSPITAL ENCOUNTER (OUTPATIENT)
Dept: ULTRASOUND IMAGING | Facility: HOSPITAL | Age: 51
Discharge: HOME | End: 2021-11-29
Payer: COMMERCIAL

## 2021-11-29 DIAGNOSIS — R22.9 LUMP OF SKIN: ICD-10-CM

## 2021-11-29 PROCEDURE — 76705 ECHO EXAM OF ABDOMEN: CPT

## 2021-12-01 PROCEDURE — 3066F NEPHROPATHY DOC TX: CPT | Performed by: PHYSICAL MEDICINE & REHABILITATION

## 2021-12-12 ENCOUNTER — HOSPITAL ENCOUNTER (OUTPATIENT)
Facility: HOSPITAL | Age: 51
Setting detail: OBSERVATION
End: 2021-12-12
Attending: EMERGENCY MEDICINE | Admitting: FAMILY MEDICINE
Payer: COMMERCIAL

## 2021-12-12 ENCOUNTER — APPOINTMENT (EMERGENCY)
Dept: CT IMAGING | Facility: HOSPITAL | Age: 51
End: 2021-12-12
Payer: COMMERCIAL

## 2021-12-12 ENCOUNTER — HOSPITAL ENCOUNTER (OUTPATIENT)
Facility: HOSPITAL | Age: 51
Setting detail: OBSERVATION
LOS: 1 days | Discharge: HOME | End: 2021-12-13
Attending: FAMILY MEDICINE | Admitting: FAMILY MEDICINE
Payer: COMMERCIAL

## 2021-12-12 VITALS
SYSTOLIC BLOOD PRESSURE: 181 MMHG | WEIGHT: 143.96 LBS | DIASTOLIC BLOOD PRESSURE: 90 MMHG | HEART RATE: 55 BPM | BODY MASS INDEX: 26.33 KG/M2 | OXYGEN SATURATION: 98 % | RESPIRATION RATE: 16 BRPM | TEMPERATURE: 97.8 F

## 2021-12-12 DIAGNOSIS — N18.9 CKD (CHRONIC KIDNEY DISEASE): ICD-10-CM

## 2021-12-12 DIAGNOSIS — R11.2 INTRACTABLE NAUSEA AND VOMITING: ICD-10-CM

## 2021-12-12 DIAGNOSIS — K85.90 PANCREATITIS: Primary | ICD-10-CM

## 2021-12-12 PROBLEM — F12.90 MARIJUANA USE: Status: ACTIVE | Noted: 2021-12-12

## 2021-12-12 LAB
ALBUMIN SERPL BCP-MCNC: 3.9 G/DL (ref 3.5–5)
ALP SERPL-CCNC: 162 U/L (ref 46–116)
ALT SERPL W P-5'-P-CCNC: 29 U/L (ref 12–78)
ANION GAP SERPL CALCULATED.3IONS-SCNC: 13 MMOL/L (ref 4–13)
AST SERPL W P-5'-P-CCNC: 17 U/L (ref 5–45)
BACTERIA UR QL AUTO: ABNORMAL /HPF
BASOPHILS # BLD AUTO: 0.04 THOUSANDS/ΜL (ref 0–0.1)
BASOPHILS NFR BLD AUTO: 0 % (ref 0–1)
BILIRUB SERPL-MCNC: 0.1 MG/DL (ref 0.2–1)
BILIRUB UR QL STRIP: NEGATIVE
BUN SERPL-MCNC: 47 MG/DL (ref 5–25)
CALCIUM SERPL-MCNC: 9.3 MG/DL (ref 8.3–10.1)
CHLORIDE SERPL-SCNC: 108 MMOL/L (ref 100–108)
CLARITY UR: CLEAR
CO2 SERPL-SCNC: 24 MMOL/L (ref 21–32)
COLOR UR: YELLOW
CREAT SERPL-MCNC: 2.07 MG/DL (ref 0.6–1.3)
EOSINOPHIL # BLD AUTO: 0.05 THOUSAND/ΜL (ref 0–0.61)
EOSINOPHIL NFR BLD AUTO: 1 % (ref 0–6)
ERYTHROCYTE [DISTWIDTH] IN BLOOD BY AUTOMATED COUNT: 12.3 % (ref 11.6–15.1)
EXT PREG TEST URINE: NEGATIVE
EXT. CONTROL ED NAV: NORMAL
GFR SERPL CREATININE-BSD FRML MDRD: 27 ML/MIN/1.73SQ M
GLUCOSE SERPL-MCNC: 132 MG/DL (ref 65–140)
GLUCOSE UR STRIP-MCNC: NEGATIVE MG/DL
HCT VFR BLD AUTO: 46.5 % (ref 34.8–46.1)
HGB BLD-MCNC: 14.9 G/DL (ref 11.5–15.4)
HGB UR QL STRIP.AUTO: ABNORMAL
IMM GRANULOCYTES # BLD AUTO: 0.03 THOUSAND/UL (ref 0–0.2)
IMM GRANULOCYTES NFR BLD AUTO: 0 % (ref 0–2)
KETONES UR STRIP-MCNC: NEGATIVE MG/DL
LEUKOCYTE ESTERASE UR QL STRIP: ABNORMAL
LIPASE SERPL-CCNC: 1727 U/L (ref 73–393)
LYMPHOCYTES # BLD AUTO: 1.8 THOUSANDS/ΜL (ref 0.6–4.47)
LYMPHOCYTES NFR BLD AUTO: 20 % (ref 14–44)
MCH RBC QN AUTO: 30.6 PG (ref 26.8–34.3)
MCHC RBC AUTO-ENTMCNC: 32 G/DL (ref 31.4–37.4)
MCV RBC AUTO: 96 FL (ref 82–98)
MONOCYTES # BLD AUTO: 0.47 THOUSAND/ΜL (ref 0.17–1.22)
MONOCYTES NFR BLD AUTO: 5 % (ref 4–12)
NEUTROPHILS # BLD AUTO: 6.61 THOUSANDS/ΜL (ref 1.85–7.62)
NEUTS SEG NFR BLD AUTO: 74 % (ref 43–75)
NITRITE UR QL STRIP: NEGATIVE
NON-SQ EPI CELLS URNS QL MICRO: ABNORMAL /HPF
NRBC BLD AUTO-RTO: 0 /100 WBCS
PH UR STRIP.AUTO: 5.5 [PH] (ref 4.5–8)
PLATELET # BLD AUTO: 289 THOUSANDS/UL (ref 149–390)
PMV BLD AUTO: 10.4 FL (ref 8.9–12.7)
POTASSIUM SERPL-SCNC: 4.2 MMOL/L (ref 3.5–5.3)
PROT SERPL-MCNC: 8.4 G/DL (ref 6.4–8.2)
PROT UR STRIP-MCNC: ABNORMAL MG/DL
RBC # BLD AUTO: 4.87 MILLION/UL (ref 3.81–5.12)
RBC #/AREA URNS AUTO: ABNORMAL /HPF
SODIUM SERPL-SCNC: 145 MMOL/L (ref 136–145)
SP GR UR STRIP.AUTO: >=1.03 (ref 1–1.03)
TRIGL SERPL-MCNC: 121 MG/DL
UROBILINOGEN UR QL STRIP.AUTO: 0.2 E.U./DL
WBC # BLD AUTO: 9 THOUSAND/UL (ref 4.31–10.16)
WBC #/AREA URNS AUTO: ABNORMAL /HPF

## 2021-12-12 PROCEDURE — 36415 COLL VENOUS BLD VENIPUNCTURE: CPT | Performed by: PHYSICIAN ASSISTANT

## 2021-12-12 PROCEDURE — 85025 COMPLETE CBC W/AUTO DIFF WBC: CPT | Performed by: PHYSICIAN ASSISTANT

## 2021-12-12 PROCEDURE — 96361 HYDRATE IV INFUSION ADD-ON: CPT

## 2021-12-12 PROCEDURE — 99285 EMERGENCY DEPT VISIT HI MDM: CPT

## 2021-12-12 PROCEDURE — 81025 URINE PREGNANCY TEST: CPT | Performed by: PHYSICIAN ASSISTANT

## 2021-12-12 PROCEDURE — G0379 DIRECT REFER HOSPITAL OBSERV: HCPCS

## 2021-12-12 PROCEDURE — 80053 COMPREHEN METABOLIC PANEL: CPT | Performed by: PHYSICIAN ASSISTANT

## 2021-12-12 PROCEDURE — 84478 ASSAY OF TRIGLYCERIDES: CPT | Performed by: FAMILY MEDICINE

## 2021-12-12 PROCEDURE — 81001 URINALYSIS AUTO W/SCOPE: CPT

## 2021-12-12 PROCEDURE — 74176 CT ABD & PELVIS W/O CONTRAST: CPT

## 2021-12-12 PROCEDURE — 99285 EMERGENCY DEPT VISIT HI MDM: CPT | Performed by: PHYSICIAN ASSISTANT

## 2021-12-12 PROCEDURE — 96374 THER/PROPH/DIAG INJ IV PUSH: CPT

## 2021-12-12 PROCEDURE — 96375 TX/PRO/DX INJ NEW DRUG ADDON: CPT

## 2021-12-12 PROCEDURE — 83690 ASSAY OF LIPASE: CPT | Performed by: PHYSICIAN ASSISTANT

## 2021-12-12 PROCEDURE — NC001 PR NO CHARGE: Performed by: FAMILY MEDICINE

## 2021-12-12 PROCEDURE — 96372 THER/PROPH/DIAG INJ SC/IM: CPT

## 2021-12-12 RX ORDER — PANTOPRAZOLE SODIUM 40 MG/1
40 TABLET, DELAYED RELEASE ORAL
Status: CANCELLED | OUTPATIENT
Start: 2021-12-13

## 2021-12-12 RX ORDER — AMLODIPINE BESYLATE 5 MG/1
5 TABLET ORAL
Status: CANCELLED | OUTPATIENT
Start: 2021-12-12

## 2021-12-12 RX ORDER — INSULIN GLARGINE 100 [IU]/ML
16 INJECTION, SOLUTION SUBCUTANEOUS
Status: CANCELLED | OUTPATIENT
Start: 2021-12-12

## 2021-12-12 RX ORDER — METOCLOPRAMIDE HYDROCHLORIDE 5 MG/ML
10 INJECTION INTRAMUSCULAR; INTRAVENOUS ONCE
Status: COMPLETED | OUTPATIENT
Start: 2021-12-12 | End: 2021-12-12

## 2021-12-12 RX ORDER — ATORVASTATIN CALCIUM 40 MG/1
20 TABLET, FILM COATED ORAL
Status: CANCELLED | OUTPATIENT
Start: 2021-12-12

## 2021-12-12 RX ORDER — SODIUM CHLORIDE 9 MG/ML
200 INJECTION, SOLUTION INTRAVENOUS CONTINUOUS
Status: DISCONTINUED | OUTPATIENT
Start: 2021-12-12 | End: 2021-12-13 | Stop reason: HOSPADM

## 2021-12-12 RX ORDER — HYDROMORPHONE HCL/PF 1 MG/ML
0.5 SYRINGE (ML) INJECTION ONCE
Status: COMPLETED | OUTPATIENT
Start: 2021-12-12 | End: 2021-12-12

## 2021-12-12 RX ORDER — HALOPERIDOL 5 MG/ML
5 INJECTION INTRAMUSCULAR ONCE
Status: COMPLETED | OUTPATIENT
Start: 2021-12-12 | End: 2021-12-12

## 2021-12-12 RX ORDER — ONDANSETRON 2 MG/ML
4 INJECTION INTRAMUSCULAR; INTRAVENOUS ONCE
Status: COMPLETED | OUTPATIENT
Start: 2021-12-12 | End: 2021-12-12

## 2021-12-12 RX ORDER — METOCLOPRAMIDE HYDROCHLORIDE 5 MG/ML
10 INJECTION INTRAMUSCULAR; INTRAVENOUS EVERY 6 HOURS PRN
Status: DISCONTINUED | OUTPATIENT
Start: 2021-12-12 | End: 2021-12-13 | Stop reason: HOSPADM

## 2021-12-12 RX ORDER — AMLODIPINE BESYLATE 5 MG/1
5 TABLET ORAL
Status: DISCONTINUED | OUTPATIENT
Start: 2021-12-12 | End: 2021-12-13 | Stop reason: HOSPADM

## 2021-12-12 RX ORDER — HYDROMORPHONE HCL/PF 1 MG/ML
0.2 SYRINGE (ML) INJECTION EVERY 2 HOUR PRN
Status: DISCONTINUED | OUTPATIENT
Start: 2021-12-12 | End: 2021-12-13 | Stop reason: HOSPADM

## 2021-12-12 RX ORDER — ONDANSETRON 2 MG/ML
4 INJECTION INTRAMUSCULAR; INTRAVENOUS EVERY 8 HOURS
Status: DISCONTINUED | OUTPATIENT
Start: 2021-12-12 | End: 2021-12-13 | Stop reason: HOSPADM

## 2021-12-12 RX ORDER — ATORVASTATIN CALCIUM 20 MG/1
20 TABLET, FILM COATED ORAL
Status: DISCONTINUED | OUTPATIENT
Start: 2021-12-13 | End: 2021-12-13 | Stop reason: HOSPADM

## 2021-12-12 RX ORDER — HYDRALAZINE HYDROCHLORIDE 20 MG/ML
5 INJECTION INTRAMUSCULAR; INTRAVENOUS ONCE
Status: COMPLETED | OUTPATIENT
Start: 2021-12-12 | End: 2021-12-12

## 2021-12-12 RX ORDER — FENTANYL CITRATE 50 UG/ML
25 INJECTION, SOLUTION INTRAMUSCULAR; INTRAVENOUS EVERY 2 HOUR PRN
Status: DISCONTINUED | OUTPATIENT
Start: 2021-12-12 | End: 2021-12-12

## 2021-12-12 RX ORDER — HYDROMORPHONE HCL/PF 1 MG/ML
1 SYRINGE (ML) INJECTION EVERY 4 HOURS
Status: DISCONTINUED | OUTPATIENT
Start: 2021-12-12 | End: 2021-12-13 | Stop reason: HOSPADM

## 2021-12-12 RX ORDER — HEPARIN SODIUM 5000 [USP'U]/ML
5000 INJECTION, SOLUTION INTRAVENOUS; SUBCUTANEOUS EVERY 8 HOURS SCHEDULED
Status: DISCONTINUED | OUTPATIENT
Start: 2021-12-12 | End: 2021-12-13 | Stop reason: HOSPADM

## 2021-12-12 RX ORDER — HEPARIN SODIUM 5000 [USP'U]/ML
5000 INJECTION, SOLUTION INTRAVENOUS; SUBCUTANEOUS EVERY 8 HOURS SCHEDULED
Status: CANCELLED | OUTPATIENT
Start: 2021-12-12

## 2021-12-12 RX ORDER — PANTOPRAZOLE SODIUM 40 MG/1
40 TABLET, DELAYED RELEASE ORAL
Status: DISCONTINUED | OUTPATIENT
Start: 2021-12-13 | End: 2021-12-13 | Stop reason: HOSPADM

## 2021-12-12 RX ORDER — INSULIN GLARGINE 100 [IU]/ML
16 INJECTION, SOLUTION SUBCUTANEOUS
Status: DISCONTINUED | OUTPATIENT
Start: 2021-12-12 | End: 2021-12-13 | Stop reason: HOSPADM

## 2021-12-12 RX ORDER — SODIUM CHLORIDE 9 MG/ML
200 INJECTION, SOLUTION INTRAVENOUS CONTINUOUS
Status: CANCELLED | OUTPATIENT
Start: 2021-12-12

## 2021-12-12 RX ADMIN — HYDROMORPHONE HYDROCHLORIDE 0.5 MG: 1 INJECTION, SOLUTION INTRAMUSCULAR; INTRAVENOUS; SUBCUTANEOUS at 15:54

## 2021-12-12 RX ADMIN — METOCLOPRAMIDE 10 MG: 5 INJECTION, SOLUTION INTRAMUSCULAR; INTRAVENOUS at 22:05

## 2021-12-12 RX ADMIN — SODIUM CHLORIDE 1000 ML: 0.9 INJECTION, SOLUTION INTRAVENOUS at 15:54

## 2021-12-12 RX ADMIN — ONDANSETRON 4 MG: 2 INJECTION INTRAMUSCULAR; INTRAVENOUS at 23:31

## 2021-12-12 RX ADMIN — INSULIN GLARGINE 16 UNITS: 100 INJECTION, SOLUTION SUBCUTANEOUS at 22:02

## 2021-12-12 RX ADMIN — METOCLOPRAMIDE 10 MG: 5 INJECTION, SOLUTION INTRAMUSCULAR; INTRAVENOUS at 11:13

## 2021-12-12 RX ADMIN — ONDANSETRON 4 MG: 2 INJECTION INTRAMUSCULAR; INTRAVENOUS at 19:27

## 2021-12-12 RX ADMIN — SODIUM CHLORIDE 200 ML/HR: 0.9 INJECTION, SOLUTION INTRAVENOUS at 21:58

## 2021-12-12 RX ADMIN — HYDRALAZINE HYDROCHLORIDE 5 MG: 20 INJECTION INTRAMUSCULAR; INTRAVENOUS at 18:19

## 2021-12-12 RX ADMIN — HEPARIN SODIUM 5000 UNITS: 5000 INJECTION INTRAVENOUS; SUBCUTANEOUS at 22:02

## 2021-12-12 RX ADMIN — HALOPERIDOL LACTATE 5 MG: 5 INJECTION, SOLUTION INTRAMUSCULAR at 11:32

## 2021-12-12 RX ADMIN — SODIUM CHLORIDE 1000 ML: 0.9 INJECTION, SOLUTION INTRAVENOUS at 11:34

## 2021-12-12 RX ADMIN — HYDROMORPHONE HYDROCHLORIDE 1 MG: 1 INJECTION, SOLUTION INTRAMUSCULAR; INTRAVENOUS; SUBCUTANEOUS at 22:02

## 2021-12-12 RX ADMIN — AMLODIPINE BESYLATE 5 MG: 5 TABLET ORAL at 22:02

## 2021-12-12 RX ADMIN — HYDROMORPHONE HYDROCHLORIDE 0.5 MG: 1 INJECTION, SOLUTION INTRAMUSCULAR; INTRAVENOUS; SUBCUTANEOUS at 19:27

## 2021-12-13 VITALS
HEART RATE: 71 BPM | OXYGEN SATURATION: 97 % | RESPIRATION RATE: 19 BRPM | BODY MASS INDEX: 26.17 KG/M2 | TEMPERATURE: 97.6 F | WEIGHT: 142.2 LBS | SYSTOLIC BLOOD PRESSURE: 150 MMHG | DIASTOLIC BLOOD PRESSURE: 86 MMHG | HEIGHT: 62 IN

## 2021-12-13 DIAGNOSIS — Z71.89 COMPLEX CARE COORDINATION: Primary | ICD-10-CM

## 2021-12-13 LAB
ALBUMIN SERPL BCP-MCNC: 3.8 G/DL (ref 3–5.2)
ALP SERPL-CCNC: 100 U/L (ref 43–122)
ALT SERPL W P-5'-P-CCNC: 22 U/L
ANION GAP SERPL CALCULATED.3IONS-SCNC: 9 MMOL/L (ref 5–14)
AST SERPL W P-5'-P-CCNC: 30 U/L (ref 14–36)
BASOPHILS # BLD AUTO: 0 THOUSANDS/ΜL (ref 0–0.1)
BASOPHILS NFR BLD AUTO: 0 % (ref 0–1)
BILIRUB SERPL-MCNC: 0.39 MG/DL
BUN SERPL-MCNC: 32 MG/DL (ref 5–25)
CALCIUM SERPL-MCNC: 8.4 MG/DL (ref 8.4–10.2)
CHLORIDE SERPL-SCNC: 110 MMOL/L (ref 97–108)
CHOLEST SERPL-MCNC: 272 MG/DL
CO2 SERPL-SCNC: 20 MMOL/L (ref 22–30)
CREAT SERPL-MCNC: 1.54 MG/DL (ref 0.6–1.2)
EOSINOPHIL # BLD AUTO: 0 THOUSAND/ΜL (ref 0–0.4)
EOSINOPHIL NFR BLD AUTO: 0 % (ref 0–6)
ERYTHROCYTE [DISTWIDTH] IN BLOOD BY AUTOMATED COUNT: 13.1 %
GFR SERPL CREATININE-BSD FRML MDRD: 39 ML/MIN/1.73SQ M
GLUCOSE P FAST SERPL-MCNC: 115 MG/DL (ref 70–99)
GLUCOSE SERPL-MCNC: 109 MG/DL (ref 65–140)
GLUCOSE SERPL-MCNC: 109 MG/DL (ref 65–140)
GLUCOSE SERPL-MCNC: 115 MG/DL (ref 70–99)
HCT VFR BLD AUTO: 40.1 % (ref 36–46)
HDLC SERPL-MCNC: 63 MG/DL
HGB BLD-MCNC: 13.3 G/DL (ref 12–16)
LDLC SERPL CALC-MCNC: 182 MG/DL
LYMPHOCYTES # BLD AUTO: 1 THOUSANDS/ΜL (ref 0.5–4)
LYMPHOCYTES NFR BLD AUTO: 12 % (ref 25–45)
MAGNESIUM SERPL-MCNC: 1.8 MG/DL (ref 1.6–2.3)
MCH RBC QN AUTO: 30.7 PG (ref 26–34)
MCHC RBC AUTO-ENTMCNC: 33.1 G/DL (ref 31–36)
MCV RBC AUTO: 93 FL (ref 80–100)
MONOCYTES # BLD AUTO: 0.7 THOUSAND/ΜL (ref 0.2–0.9)
MONOCYTES NFR BLD AUTO: 9 % (ref 1–10)
NEUTROPHILS # BLD AUTO: 6.3 THOUSANDS/ΜL (ref 1.8–7.8)
NEUTS SEG NFR BLD AUTO: 79 % (ref 45–65)
PHOSPHATE SERPL-MCNC: 3.4 MG/DL (ref 2.5–4.8)
PLATELET # BLD AUTO: 243 THOUSANDS/UL (ref 150–450)
PMV BLD AUTO: 8.5 FL (ref 8.9–12.7)
POTASSIUM SERPL-SCNC: 4.4 MMOL/L (ref 3.6–5)
PROT SERPL-MCNC: 7.3 G/DL (ref 5.9–8.4)
RBC # BLD AUTO: 4.33 MILLION/UL (ref 4–5.2)
SODIUM SERPL-SCNC: 139 MMOL/L (ref 137–147)
TRIGL SERPL-MCNC: 137 MG/DL
WBC # BLD AUTO: 8 THOUSAND/UL (ref 4.5–11)

## 2021-12-13 PROCEDURE — 99220 PR INITIAL OBSERVATION CARE/DAY 70 MINUTES: CPT | Performed by: FAMILY MEDICINE

## 2021-12-13 PROCEDURE — 80053 COMPREHEN METABOLIC PANEL: CPT | Performed by: FAMILY MEDICINE

## 2021-12-13 PROCEDURE — 83735 ASSAY OF MAGNESIUM: CPT | Performed by: FAMILY MEDICINE

## 2021-12-13 PROCEDURE — 80061 LIPID PANEL: CPT | Performed by: FAMILY MEDICINE

## 2021-12-13 PROCEDURE — 82948 REAGENT STRIP/BLOOD GLUCOSE: CPT

## 2021-12-13 PROCEDURE — 85025 COMPLETE CBC W/AUTO DIFF WBC: CPT | Performed by: FAMILY MEDICINE

## 2021-12-13 PROCEDURE — 84100 ASSAY OF PHOSPHORUS: CPT | Performed by: FAMILY MEDICINE

## 2021-12-13 RX ADMIN — HYDROMORPHONE HYDROCHLORIDE 1 MG: 1 INJECTION, SOLUTION INTRAMUSCULAR; INTRAVENOUS; SUBCUTANEOUS at 06:06

## 2021-12-13 RX ADMIN — HYDROMORPHONE HYDROCHLORIDE 1 MG: 1 INJECTION, SOLUTION INTRAMUSCULAR; INTRAVENOUS; SUBCUTANEOUS at 02:14

## 2021-12-13 RX ADMIN — SODIUM CHLORIDE 200 ML/HR: 0.9 INJECTION, SOLUTION INTRAVENOUS at 07:52

## 2021-12-13 RX ADMIN — ONDANSETRON 4 MG: 2 INJECTION INTRAMUSCULAR; INTRAVENOUS at 07:50

## 2021-12-13 RX ADMIN — HEPARIN SODIUM 5000 UNITS: 5000 INJECTION INTRAVENOUS; SUBCUTANEOUS at 05:50

## 2021-12-13 RX ADMIN — PANTOPRAZOLE SODIUM 40 MG: 40 TABLET, DELAYED RELEASE ORAL at 05:50

## 2021-12-14 ENCOUNTER — TELEPHONE (OUTPATIENT)
Dept: FAMILY MEDICINE CLINIC | Facility: CLINIC | Age: 51
End: 2021-12-14

## 2021-12-14 ENCOUNTER — PATIENT OUTREACH (OUTPATIENT)
Dept: FAMILY MEDICINE CLINIC | Facility: CLINIC | Age: 51
End: 2021-12-14

## 2021-12-17 ENCOUNTER — OFFICE VISIT (OUTPATIENT)
Dept: PAIN MEDICINE | Facility: CLINIC | Age: 51
End: 2021-12-17
Payer: COMMERCIAL

## 2021-12-17 VITALS
TEMPERATURE: 97.5 F | DIASTOLIC BLOOD PRESSURE: 80 MMHG | BODY MASS INDEX: 26.01 KG/M2 | SYSTOLIC BLOOD PRESSURE: 142 MMHG | HEART RATE: 80 BPM | HEIGHT: 62 IN

## 2021-12-17 DIAGNOSIS — M48.02 CERVICAL SPINAL STENOSIS: ICD-10-CM

## 2021-12-17 DIAGNOSIS — M50.120 CERVICAL DISC DISORDER WITH RADICULOPATHY OF MID-CERVICAL REGION: ICD-10-CM

## 2021-12-17 DIAGNOSIS — M54.2 NECK PAIN: Primary | ICD-10-CM

## 2021-12-17 PROCEDURE — 1036F TOBACCO NON-USER: CPT | Performed by: PHYSICAL MEDICINE & REHABILITATION

## 2021-12-17 PROCEDURE — 99204 OFFICE O/P NEW MOD 45 MIN: CPT | Performed by: PHYSICAL MEDICINE & REHABILITATION

## 2021-12-21 DIAGNOSIS — F31.9 BIPOLAR 1 DISORDER (HCC): ICD-10-CM

## 2021-12-21 RX ORDER — DEXTROAMPHETAMINE SACCHARATE, AMPHETAMINE ASPARTATE MONOHYDRATE, DEXTROAMPHETAMINE SULFATE AND AMPHETAMINE SULFATE 5; 5; 5; 5 MG/1; MG/1; MG/1; MG/1
20 CAPSULE, EXTENDED RELEASE ORAL 2 TIMES DAILY
Qty: 60 CAPSULE | Refills: 0 | Status: SHIPPED | OUTPATIENT
Start: 2021-12-21 | End: 2022-01-20 | Stop reason: SDUPTHER

## 2021-12-23 ENCOUNTER — HOSPITAL ENCOUNTER (EMERGENCY)
Facility: HOSPITAL | Age: 51
Discharge: HOME | End: 2021-12-23
Attending: EMERGENCY MEDICINE | Admitting: EMERGENCY MEDICINE
Payer: COMMERCIAL

## 2021-12-23 VITALS
SYSTOLIC BLOOD PRESSURE: 115 MMHG | DIASTOLIC BLOOD PRESSURE: 69 MMHG | HEART RATE: 68 BPM | TEMPERATURE: 98.4 F | OXYGEN SATURATION: 97 % | RESPIRATION RATE: 18 BRPM

## 2021-12-23 DIAGNOSIS — R20.8 PAIN OF SKIN: Primary | ICD-10-CM

## 2021-12-23 PROCEDURE — 99283 EMERGENCY DEPT VISIT LOW MDM: CPT

## 2021-12-23 PROCEDURE — 99282 EMERGENCY DEPT VISIT SF MDM: CPT | Performed by: PHYSICIAN ASSISTANT

## 2021-12-24 NOTE — DISCHARGE INSTRUCTIONS
You were seen in the emergency department today for skin pain  Please follow-up with your primary care physician/ dermatology regarding your symptoms  Please return to the emergency department with any worsening symptoms including but not limited to: fevers, dizziness, chest pain, shortness of breath, palpitations, loss of consciousness, abdominal pain, nausea, vomiting, diarrhea, or lower extremity changes  Please utilize blankets as well as a coat to prevent the wind from hurting your skin  Please moisturize your skin  Please take your pain medications for chronic pain as directed

## 2021-12-24 NOTE — ED NOTES
AVS reviewed with pt, pt verbalized understanding of d/c instructions  VSS  Pt left in personal wheelchair; alert and oriented with all belongings        Naila Perez RN  12/23/21 3787

## 2021-12-24 NOTE — ED PROVIDER NOTES
History  Chief Complaint   Patient presents with    Skin Problem     Patient states her skin is hurting  all over her body  reports constant skin pain  began three days ago  relieved by percocet which is normally taken for other chronic health problems  Sharon Chambers is a 48 y o   female with PMH of diabetes, hypertension, Cushing's, pancreatitis, anemia, asthma, ADHD, PTSD with intracranial lid ptosis, CKD, liver disease, GERD who presents to the emergency department with complaints them when the wind hits her skin it burns  The patient states the last 2 days when she goes outside and when he hits her skin it burns  She states this never happened before and is concerned about it  She denies any rashes, chemical exposures, new animals, new detergents, new shampoos, new body washes, new deodorants, new sheets or clothing  She states this has never happened before  Her chronic daily percocet seems to help  She denies any rashes or bites  She denies any other complaints  The patient denies any fevers, chills, dizziness, headaches, chest pain, shortness of breath, palpitations, abdominal pain, nausea, vomiting, diarrhea, lower extremity pain/swelling/edema  Per chart review patient was recently seen by pain management with a plan for fluoro for neck pain  The patient was recently discharged for pancreatitis and had labs on 12/13 which were baseline for the patient  History provided by:  Patient   used: No    Medical Problem  Location:  Skin  Quality:  Burning with cold wind   Onset quality:  Gradual  Duration:  3 days  Timing:  Constant  Chronicity:  New  Context:   With cold wind   Relieved by:  Percocet  Worsened by:  Wind   Associated symptoms: no abdominal pain, no chest pain, no congestion, no cough, no diarrhea, no ear pain, no fatigue, no fever, no headaches, no loss of consciousness, no myalgias, no nausea, no rash, no rhinorrhea, no shortness of breath, no sore throat, no vomiting and no wheezing        Prior to Admission Medications   Prescriptions Last Dose Informant Patient Reported? Taking?    Alcohol Swabs (Alcohol Pads) 70 % PADS 12/23/2021 at Unknown time  No Yes   Sig: Use 4 (four) times a day   Blood Glucose Monitoring Suppl (FreeStyle Lite) DEIRDRE 12/23/2021 at Unknown time  No Yes   Sig: Use daily   Dexilant 60 MG capsule Past Month at Unknown time  No Yes   Sig: TAKE ONE CAPSULE BY MOUTH ONCE DAILY   Easy Comfort Lancets MISC 12/23/2021 at Unknown time  No Yes   Sig: USE TO TEST THE BLOOD SUGAR THREE TIMES A DAY   Insulin Pen Needle (PEN NEEDLES) 31G X 8 MM MISC 12/23/2021 at Unknown time Self No Yes   Sig: Inject 1 Stick under the skin 4 (four) times a day (with meals and at bedtime)   Lidocaine Viscous HCl (XYLOCAINE) 2 % mucosal solution Past Month at Unknown time  No Yes   Sig: Swish and spit 15 mL 4 (four) times a day as needed for mouth pain or discomfort   Movantik 25 MG tablet   Yes No   Nutritional Supplements (Ensure Original) LIQD Not Taking at Unknown time  No No   Sig: Take 1 Bottle by mouth 2 (two) times a day   Patient not taking: Reported on 12/23/2021    Probiotic Product (PROBIOTIC-10 PO) Unknown at Unknown time Self Yes No   Sig: Take 1 tablet by mouth daily   QUEtiapine (SEROquel) 200 mg tablet   Yes No   Sig: Take 150 mg by mouth daily at bedtime    amLODIPine (NORVASC) 5 mg tablet   No No   Sig: Take 1 tablet (5 mg total) by mouth daily   amphetamine-dextroamphetamine (ADDERALL XR) 20 MG 24 hr capsule Past Week at Unknown time  No Yes   Sig: Take 1 capsule (20 mg total) by mouth 2 (two) times a day Max Daily Amount: 40 mg   atorvastatin (LIPITOR) 20 mg tablet   No No   Sig: TAKE ONE TABLET BY MOUTH ONCE DAILY   buPROPion (WELLBUTRIN XL) 300 mg 24 hr tablet Past Week at Unknown time  Yes Yes   Sig: Take 300 mg by mouth every morning    clobetasol (TEMOVATE) 0 05 % cream   No No   Sig: APPLY TOPICALLY DAILY IN THE EVENING, AFTER 7 DAYS DECREASE TO 4-5 TIMES A WEEK FOR 1 WEEK THEN DECREASE TO THREE TIMES A WEEK   conjugated estrogens (PREMARIN) vaginal cream Past Week at Unknown time  No Yes   Sig: Insert 1 g into the vagina 2 (two) times a week   diphenoxylate-atropine (LOMOTIL) 2 5-0 025 mg per tablet   No No   Sig: Take 1 tablet by mouth 4 (four) times a day as needed for diarrhea   docusate sodium (DOK) 100 mg capsule   No No   Sig: Take 1 capsule (100 mg total) by mouth 2 (two) times a day   glucose blood (FREESTYLE LITE) test strip 12/23/2021 at Unknown time  No Yes   Sig: Use as instructed   hydrocortisone 1 % cream   No No   Sig: APPLY TOPICALLY TO AFFECTED AREA FOUR TIMES A DAY AS NEEDED FOR IRRITATION   insulin aspart (NovoLOG FlexPen) 100 UNIT/ML injection pen 12/22/2021 at Unknown time  No Yes   Sig: Inject 6 Units under the skin 3 (three) times a day with meals   insulin glargine (Toujeo Max SoloStar) 300 units/mL CONCENTRATED U-300 injection pen (2-unit dial) 12/22/2021 at Unknown time  No Yes   Sig: Inject 22 Units under the skin daily   lidocaine (XYLOCAINE) 5 % ointment More than a month at Unknown time  No No   Sig: APPLY TOPICALLY 2GM TWICE A DAY TO AFFECTED AREAS AS NEEDED FOR MILD PAIN   linaCLOtide (Linzess) 72 MCG CAPS More than a month at Unknown time  No No   Sig: Take 72 mcg by mouth daily   montelukast (SINGULAIR) 10 mg tablet   No No   Sig: TAKE ONE TABLET BY MOUTH ONCE DAILY AT BEDTIME   ondansetron (ZOFRAN) 4 mg tablet Past Week at Unknown time  No Yes   Sig: Take 1 tablet (4 mg total) by mouth every 8 (eight) hours as needed for nausea or vomiting   oxyCODONE (ROXICODONE) 10 MG TABS 12/23/2021 at Unknown time  No Yes   Sig: Take 1 tablet (10 mg total) by mouth every 4 (four) hours as needed for moderate pain Max Daily Amount: 60 mg   pantoprazole (PROTONIX) 40 mg tablet 12/22/2021 at Unknown time  No Yes   Sig: Take 1 tablet (40 mg total) by mouth 2 (two) times a day before meals   promethazine (PHENERGAN) 25 mg tablet Not Taking at Unknown time  No No   Sig: Take 1 tablet (25 mg total) by mouth every 6 (six) hours as needed for nausea or vomiting   Patient not taking: Reported on 12/23/2021    scopolamine (TRANSDERM-SCOP) 1 5 mg/3 days TD 72 hr patch 12/23/2021 at Unknown time  No Yes   Sig: Place 1 patch on the skin every third day   sucralfate (CARAFATE) 1 g/10 mL suspension   No No   Sig: Take 10 mL (1 g total) by mouth 4 (four) times a day      Facility-Administered Medications: None       Past Medical History:   Diagnosis Date    ADHD     Anemia of chronic disease     Anxiety     Asthma     Asthma     Borderline personality disorder ([facility] )     Cataplexy     Chronic abdominal pain     CKD (chronic kidney disease) stage 3, GFR 30-59 ml/min (HCC)     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[facility] )     Cushing syndrome ([facility] )     Diabetes mellitus ([place] [address] )     DVT (deep venous thrombosis) (HCC)     GERD (gastroesophageal reflux disease)     History of acute pancreatitis     felt secondary to Bactrim    History of transfusion     HTN (hypertension)     Hypertension     Liver disease     fatty liver    Microscopic polyangiitis (HCC)     Morbid obesity (HCC)     MPA (microscopic polyangiitis) (HCC)     Ovarian cyst     PTSD (post-traumatic stress disorder)     Renal disorder     Self-inflicted injury     self inflicted skin wounds    Wegener's granulomatosis with renal involvement ([place] [address] ) 2015       Past Surgical History:   Procedure Laterality Date    COLONOSCOPY      ESOPHAGOGASTRODUODENOSCOPY  09/11/2015    mild antral gastritis    GASTRIC STIMULATOR IMPLANT SURGERY  06/25/2020    HI COLONOSCOPY FLX DX W/COLLJ SPEC WHEN PFRMD N/A 12/14/2018    Procedure: COLONOSCOPY with polypectomy;  Surgeon: Maurilio Flood MD;  Location: [facility];   Service: Gastroenterology    HI ESOPHAGOGASTRODUODENOSCOPY TRANSORAL DIAGNOSTIC N/A 12/14/2018    Procedure: ESOPHAGOGASTRODUODENOSCOPY (EGD) with biopsy;  Surgeon: Maurilio Flood MD;  Location: [facility]; Service: Gastroenterology    RELEASE SCAR CONTRACTURE / GRAFT REPAIRS OF HAND Bilateral     UPPER GASTROINTESTINAL ENDOSCOPY  12/26/2019       Family History   Problem Relation Age of Onset    No Known Problems Mother     No Known Problems Father     Colon cancer Neg Hx     Drug abuse Neg Hx         mother father    Mental illness Neg Hx         disorder, mother father    Cancer Neg Hx     Breast cancer Neg Hx      I have reviewed and agree with the history as documented  E-Cigarette/Vaping    E-Cigarette Use Never User      E-Cigarette/Vaping Substances    Nicotine No     THC No     CBD No      Social History     Tobacco Use    Smoking status: Former Smoker     Quit date: 2011     Years since quitting: 10 9    Smokeless tobacco: Never Used    Tobacco comment: marijuana   Vaping Use    Vaping Use: Never used   Substance Use Topics    Alcohol use: Never    Drug use: Yes     Types: Marijuana     Comment: marijuana daily       Review of Systems   Constitutional: Negative for activity change, appetite change, chills, diaphoresis, fatigue, fever and unexpected weight change  HENT: Negative for congestion, dental problem, ear pain, mouth sores, nosebleeds, rhinorrhea, sinus pressure, sinus pain, sneezing, sore throat and trouble swallowing  Respiratory: Negative for apnea, cough, choking, chest tightness, shortness of breath, wheezing and stridor  Cardiovascular: Negative for chest pain, palpitations and leg swelling  Gastrointestinal: Negative for abdominal pain, constipation, diarrhea, nausea and vomiting  Genitourinary: Negative for dysuria, flank pain, frequency and urgency  Musculoskeletal: Negative for arthralgias, joint swelling and myalgias  Skin: Negative for color change, pallor, rash and wound  Neurological: Negative for dizziness, tremors, seizures, loss of consciousness, syncope, speech difficulty, weakness, light-headedness, numbness and headaches     All other systems reviewed and are negative  Physical Exam  Physical Exam  Vitals and nursing note reviewed  Constitutional:       General: She is not in acute distress  Appearance: Normal appearance  She is normal weight  She is not ill-appearing or toxic-appearing  HENT:      Head: Normocephalic and atraumatic  Mouth/Throat:      Mouth: Mucous membranes are moist       Pharynx: Oropharynx is clear  Eyes:      Extraocular Movements: Extraocular movements intact  Pupils: Pupils are equal, round, and reactive to light  Cardiovascular:      Rate and Rhythm: Normal rate and regular rhythm  Pulses: Normal pulses  Heart sounds: Normal heart sounds  No murmur heard  No friction rub  No gallop  Pulmonary:      Effort: Pulmonary effort is normal  No respiratory distress  Breath sounds: Normal breath sounds  No stridor  No wheezing, rhonchi or rales  Abdominal:      General: Abdomen is flat  Bowel sounds are normal  There is no distension  Palpations: Abdomen is soft  There is no mass  Tenderness: There is no abdominal tenderness  There is no guarding or rebound  Hernia: No hernia is present  Musculoskeletal:         General: No swelling, tenderness, deformity or signs of injury  Normal range of motion  Cervical back: Normal range of motion  No rigidity or tenderness  Right lower leg: No edema  Left lower leg: No edema  Skin:     General: Skin is warm and dry  Capillary Refill: Capillary refill takes less than 2 seconds  Coloration: Skin is not jaundiced or pale  Findings: No bruising, erythema, lesion or rash  Comments: Skin has chronic scarring but no evidence of rashes, bites erythema or cellulitis  Patient states a slight brush of the hand on the scan is tender however when distracted patient does not react to touching of the skin  Patient is not jaundiced    No diffuse erythema of the skin    Neurological:      General: No focal deficit present  Mental Status: She is alert and oriented to person, place, and time  Mental status is at baseline  Cranial Nerves: No cranial nerve deficit  Sensory: No sensory deficit  Motor: No weakness  Coordination: Coordination normal       Comments: GCS 15  CN 2-12 intact  PERRLA  Bilateral upper and lower extremities have 5/5 strength and sensation is intact  Finger to Nose and Heel to shin are intact  Speech normal            Vital Signs  ED Triage Vitals   Temperature Pulse Respirations Blood Pressure SpO2   12/23/21 2239 12/23/21 2239 12/23/21 2239 12/23/21 2239 12/23/21 2239   98 3 °F (36 8 °C) 66 16 110/61 98 %      Temp Source Heart Rate Source Patient Position - Orthostatic VS BP Location FiO2 (%)   12/23/21 2239 12/23/21 2239 12/23/21 2239 12/23/21 2239 --   Oral Monitor Sitting Right arm       Pain Score       12/23/21 2250       6           Vitals:    12/23/21 2239 12/23/21 2314   BP: 110/61 115/69   Pulse: 66 68   Patient Position - Orthostatic VS: Sitting Sitting         Visual Acuity      ED Medications  Medications - No data to display    Diagnostic Studies  Results Reviewed     None                 No orders to display              Procedures  Procedures         ED Course                               SBIRT 20yo+      Most Recent Value   SBIRT (25 yo +)    In order to provide better care to our patients, we are screening all of our patients for alcohol and drug use  Would it be okay to ask you these screening questions? Yes Filed at: 12/23/2021 2249   Initial Alcohol Screen: US AUDIT-C     1  How often do you have a drink containing alcohol? 0 Filed at: 12/23/2021 2249   2  How many drinks containing alcohol do you have on a typical day you are drinking? 0 Filed at: 12/23/2021 2249   3b  FEMALE Any Age, or MALE 65+: How often do you have 4 or more drinks on one occassion?  0 Filed at: 12/23/2021 2249   Audit-C Score 0 Filed at: 12/23/2021 2249   BEATA: How many times in the past year have you    Used an illegal drug or used a prescription medication for non-medical reasons? Never Filed at: 12/23/2021 2249                    [facility]  Number of Diagnoses or Management Options  Pain of skin: new and requires workup  Diagnosis management comments: Patient was seen and examined  in the emergency department for chief complaint of burning of the skin when exposed to wind  The patient presented approximately 3 days of symptoms that worsened when going outside and exposed to the wind  Percocet helps the pain  Patient has no other complaints  No new exposures  On exam patient no acute distress, lungs are clear, regular rate rhythm close of gallops  Abdomen soft nontender nondistended  GCS 15  CN 2-12 intact  PERRLA  Bilateral upper and lower extremities have 5/5 strength and sensation is intact  Finger to Nose and Heel to shin are intact  Speech normal   No erythema or jaundice  Initial differential includes but is not limited to:  Renal dysfunction, liver dysfunction, jaundice, dry skin, chemical exposure, shingles, normal exposure to cold weather      Workup:  The patient had discussion after thorough physical exam   Discussed lack of findings on physical exam   I offered lab work however stated likely would not renew evidences patient recently had lab work which was baseline for her  Patient declined lab work and asked for a referral to Dermatology  Referral provided  Patient has no other acute complaints  Patient states she will be taking her Percocet for chronic pain as directed  Case discussed with the attending who agrees with current management  Disposition:  General impression 80-year-old female with skin problem  Follow-up with Dermatology  Return precautions discussed  PCP follow-up discussed  Safe taking of Percocet discussed  The patient was discharged in stable condition  Patient ambulated off the department    Extensive return to emergency department precautions were discussed  Follow up with appropriate providers including primary care physician was discussed  Patient and/or their  primary decision maker expressed understanding  Patient remained stable during entire emergency department stay  Amount and/or Complexity of Data Reviewed  Decide to obtain previous medical records or to obtain history from someone other than the patient: yes  Review and summarize past medical records: yes  Discuss the patient with other providers: yes    Risk of Complications, Morbidity, and/or Mortality  Presenting problems: low  Diagnostic procedures: low  Management options: low    Patient Progress  Patient progress: stable      Disposition  Final diagnoses:   Pain of skin     Time reflects when diagnosis was documented in both MDM as applicable and the Disposition within this note     Time User Action Codes Description Comment    12/23/2021 11:03 PM Abdias Dunbar Add [R20 8] Pain of skin       ED Disposition     ED Disposition Condition Date/Time Comment    Discharge Stable u Dec 23, 2021 11:03 PM Haider Carroll discharge to home/self care              Follow-up Information     Follow up With Specialties Details Why Contact Info Additional [address] [facility] Emergency Medicine Go to  As needed, If symptoms worsen [facility] [id number]  [address] [place] [address] Emergency Department, [address] [place] [address], [facility] Al Gallegos PA-C Family Medicine Go to  As needed, If symptoms worsen [id number] [facility] [address]        [facility] Dermatology Go to  for follow-up of symtptoms as needed after pediatrician follow-up [address] [facility] [address] , [address]  [place], [place], [address] [facility] Discharge Medication List as of 12/23/2021 11:06 PM      CONTINUE these medications which have NOT CHANGED    Details   Alcohol Swabs (Alcohol Pads) 70 % PADS Use 4 (four) times a day, Starting Wed 12/1/2021, Normal      amLODIPine (NORVASC) 5 mg tablet Take 1 tablet (5 mg total) by mouth daily, Starting Tue 9/7/2021, Normal      amphetamine-dextroamphetamine (ADDERALL XR) 20 MG 24 hr capsule Take 1 capsule (20 mg total) by mouth 2 (two) times a day Max Daily Amount: 40 mg, Starting Tue 12/21/2021, Normal      atorvastatin (LIPITOR) 20 mg tablet TAKE ONE TABLET BY MOUTH ONCE DAILY, Normal      Blood Glucose Monitoring Suppl (FreeStyle Lite) DEIRDRE Use daily, Starting Fri 7/23/2021, Normal      buPROPion (WELLBUTRIN XL) 300 mg 24 hr tablet Take 300 mg by mouth every morning , Starting Fri 12/4/2020, Historical Med      clobetasol (TEMOVATE) 0 05 % cream APPLY TOPICALLY DAILY IN THE EVENING, AFTER 7 DAYS DECREASE TO 4-5 TIMES A WEEK FOR 1 WEEK THEN DECREASE TO THREE TIMES A WEEK, Normal      conjugated estrogens (PREMARIN) vaginal cream Insert 1 g into the vagina 2 (two) times a week, Starting Thu 5/20/2021, Normal      Dexilant 60 MG capsule TAKE ONE CAPSULE BY MOUTH ONCE DAILY, Normal      diphenoxylate-atropine (LOMOTIL) 2 5-0 025 mg per tablet Take 1 tablet by mouth 4 (four) times a day as needed for diarrhea, Starting Mon 5/10/2021, Normal      docusate sodium (DOK) 100 mg capsule Take 1 capsule (100 mg total) by mouth 2 (two) times a day, Starting Wed 11/17/2021, Normal      Easy Comfort Lancets MISC USE TO TEST THE BLOOD SUGAR THREE TIMES A DAY, Normal      glucose blood (FREESTYLE LITE) test strip Use as instructed, Normal      hydrocortisone 1 % cream APPLY TOPICALLY TO AFFECTED AREA FOUR TIMES A DAY AS NEEDED FOR IRRITATION, Normal      insulin aspart (NovoLOG FlexPen) 100 UNIT/ML injection pen Inject 6 Units under the skin 3 (three) times a day with meals, Starting Tue 11/2/2021, Normal      insulin glargine (Toujeo Max SoloStar) 300 units/mL CONCENTRATED U-300 injection pen (2-unit dial) Inject 22 Units under the skin daily, Starting Mon 11/22/2021, Normal      Insulin Pen Needle (PEN NEEDLES) 31G X 8 MM MISC Inject 1 Stick under the skin 4 (four) times a day (with meals and at bedtime), Starting Wed 3/28/2018, Normal      lidocaine (XYLOCAINE) 5 % ointment APPLY TOPICALLY 2GM TWICE A DAY TO AFFECTED AREAS AS NEEDED FOR MILD PAIN, Normal      Lidocaine Viscous HCl (XYLOCAINE) 2 % mucosal solution Swish and spit 15 mL 4 (four) times a day as needed for mouth pain or discomfort, Starting Fri 7/16/2021, Normal      linaCLOtide (Linzess) 72 MCG CAPS Take 72 mcg by mouth daily, Starting Thu 2/18/2021, Normal      montelukast (SINGULAIR) 10 mg tablet TAKE ONE TABLET BY MOUTH ONCE DAILY AT BEDTIME, Normal      Movantik 25 MG tablet Starting Sat 5/22/2021, Historical Med      Nutritional Supplements (Ensure Original) LIQD Take 1 Bottle by mouth 2 (two) times a day, Starting Tue 10/13/2020, Print      ondansetron (ZOFRAN) 4 mg tablet Take 1 tablet (4 mg total) by mouth every 8 (eight) hours as needed for nausea or vomiting, Starting Tue 11/2/2021, Normal      oxyCODONE (ROXICODONE) 10 MG TABS Take 1 tablet (10 mg total) by mouth every 4 (four) hours as needed for moderate pain Max Daily Amount: 60 mg, Starting Wed 12/1/2021, Normal      pantoprazole (PROTONIX) 40 mg tablet Take 1 tablet (40 mg total) by mouth 2 (two) times a day before meals, Starting Wed 8/26/2020, Normal      Probiotic Product (PROBIOTIC-10 PO) Take 1 tablet by mouth daily, Historical Med      promethazine (PHENERGAN) 25 mg tablet Take 1 tablet (25 mg total) by mouth every 6 (six) hours as needed for nausea or vomiting, Starting Mon 5/10/2021, Normal      QUEtiapine (SEROquel) 200 mg tablet Take 150 mg by mouth daily at bedtime , Historical Med      scopolamine (TRANSDERM-SCOP) 1 5 mg/3 days TD 72 hr patch Place 1 patch on the skin every third day, Starting Tue 4/27/2021, Normal      sucralfate (CARAFATE) 1 g/10 mL suspension Take 10 mL (1 g total) by mouth 4 (four) times a day, Starting Sun 5/9/2021, Normal             No discharge procedures on file      PDMP Review       Value Time User    PDMP Reviewed  Yes 12/21/2021  1:50 PM Teri Mcneil PA-C          ED Provider  Electronically Signed by           Eloisa Wilson PA-C  12/23/21 [id number]

## 2021-12-29 ENCOUNTER — OFFICE VISIT (OUTPATIENT)
Dept: FAMILY MEDICINE CLINIC | Facility: CLINIC | Age: 51
End: 2021-12-29

## 2021-12-29 VITALS
OXYGEN SATURATION: 98 % | TEMPERATURE: 98 F | DIASTOLIC BLOOD PRESSURE: 60 MMHG | HEIGHT: 62 IN | RESPIRATION RATE: 18 BRPM | SYSTOLIC BLOOD PRESSURE: 112 MMHG | BODY MASS INDEX: 26.01 KG/M2 | HEART RATE: 67 BPM

## 2021-12-29 DIAGNOSIS — Z78.9: ICD-10-CM

## 2021-12-29 DIAGNOSIS — R22.2 LUMP OF SKIN OF BACK: ICD-10-CM

## 2021-12-29 DIAGNOSIS — M79.605 LOW BACK PAIN RADIATING TO LEFT LOWER EXTREMITY: Primary | ICD-10-CM

## 2021-12-29 DIAGNOSIS — M54.12 CERVICAL RADICULOPATHY: Chronic | ICD-10-CM

## 2021-12-29 DIAGNOSIS — R29.898 WEAKNESS OF BOTH LOWER EXTREMITIES: ICD-10-CM

## 2021-12-29 DIAGNOSIS — H91.93 HEARING DIFFICULTY OF BOTH EARS: ICD-10-CM

## 2021-12-29 DIAGNOSIS — R21 RASH: ICD-10-CM

## 2021-12-29 DIAGNOSIS — M54.50 LOW BACK PAIN RADIATING TO LEFT LOWER EXTREMITY: Primary | ICD-10-CM

## 2021-12-29 PROCEDURE — 3074F SYST BP LT 130 MM HG: CPT | Performed by: PHYSICIAN ASSISTANT

## 2021-12-29 PROCEDURE — 3078F DIAST BP <80 MM HG: CPT | Performed by: PHYSICIAN ASSISTANT

## 2021-12-29 PROCEDURE — 99214 OFFICE O/P EST MOD 30 MIN: CPT | Performed by: PHYSICIAN ASSISTANT

## 2021-12-29 PROCEDURE — 1111F DSCHRG MED/CURRENT MED MERGE: CPT | Performed by: PHYSICIAN ASSISTANT

## 2021-12-29 RX ORDER — CLOBETASOL PROPIONATE 0.5 MG/G
CREAM TOPICAL 2 TIMES DAILY
Qty: 60 G | Refills: 2 | Status: SHIPPED | OUTPATIENT
Start: 2021-12-29 | End: 2022-03-10 | Stop reason: SDUPTHER

## 2021-12-30 ENCOUNTER — TELEPHONE (OUTPATIENT)
Dept: NEPHROLOGY | Facility: HOSPITAL | Age: 51
End: 2021-12-30

## 2021-12-30 ENCOUNTER — APPOINTMENT (OUTPATIENT)
Dept: LAB | Facility: CLINIC | Age: 51
End: 2021-12-30
Payer: COMMERCIAL

## 2021-12-30 DIAGNOSIS — I12.9 BENIGN HYPERTENSION WITH CKD (CHRONIC KIDNEY DISEASE) STAGE IV (HCC): Chronic | ICD-10-CM

## 2021-12-30 DIAGNOSIS — M31.7 MPA (MICROSCOPIC POLYANGIITIS) (HCC): Chronic | ICD-10-CM

## 2021-12-30 DIAGNOSIS — N18.4 BENIGN HYPERTENSION WITH CKD (CHRONIC KIDNEY DISEASE) STAGE IV (HCC): Chronic | ICD-10-CM

## 2021-12-30 DIAGNOSIS — N18.32 ACUTE RENAL FAILURE SUPERIMPOSED ON STAGE 3B CHRONIC KIDNEY DISEASE, UNSPECIFIED ACUTE RENAL FAILURE TYPE (HCC): ICD-10-CM

## 2021-12-30 DIAGNOSIS — R80.1 PERSISTENT PROTEINURIA: ICD-10-CM

## 2021-12-30 DIAGNOSIS — K31.84 GASTROPARESIS: ICD-10-CM

## 2021-12-30 DIAGNOSIS — N17.9 ACUTE RENAL FAILURE SUPERIMPOSED ON STAGE 3B CHRONIC KIDNEY DISEASE, UNSPECIFIED ACUTE RENAL FAILURE TYPE (HCC): ICD-10-CM

## 2021-12-30 DIAGNOSIS — D63.8 ANEMIA OF CHRONIC DISEASE: ICD-10-CM

## 2021-12-30 LAB
ALBUMIN SERPL BCP-MCNC: 3.6 G/DL (ref 3.5–5)
ALP SERPL-CCNC: 119 U/L (ref 46–116)
ALT SERPL W P-5'-P-CCNC: 20 U/L (ref 12–78)
ANION GAP SERPL CALCULATED.3IONS-SCNC: 3 MMOL/L (ref 4–13)
AST SERPL W P-5'-P-CCNC: 16 U/L (ref 5–45)
BILIRUB SERPL-MCNC: 0.33 MG/DL (ref 0.2–1)
BUN SERPL-MCNC: 45 MG/DL (ref 5–25)
CALCIUM SERPL-MCNC: 9.2 MG/DL (ref 8.3–10.1)
CHLORIDE SERPL-SCNC: 111 MMOL/L (ref 100–108)
CO2 SERPL-SCNC: 26 MMOL/L (ref 21–32)
CREAT SERPL-MCNC: 1.88 MG/DL (ref 0.6–1.3)
ERYTHROCYTE [DISTWIDTH] IN BLOOD BY AUTOMATED COUNT: 12.8 % (ref 11.6–15.1)
GFR SERPL CREATININE-BSD FRML MDRD: 30 ML/MIN/1.73SQ M
GLUCOSE SERPL-MCNC: 103 MG/DL (ref 65–140)
HCT VFR BLD AUTO: 41.9 % (ref 34.8–46.1)
HGB BLD-MCNC: 13.1 G/DL (ref 11.5–15.4)
MCH RBC QN AUTO: 29.8 PG (ref 26.8–34.3)
MCHC RBC AUTO-ENTMCNC: 31.3 G/DL (ref 31.4–37.4)
MCV RBC AUTO: 95 FL (ref 82–98)
PLATELET # BLD AUTO: 261 THOUSANDS/UL (ref 149–390)
PMV BLD AUTO: 11.4 FL (ref 8.9–12.7)
POTASSIUM SERPL-SCNC: 4.3 MMOL/L (ref 3.5–5.3)
PROT SERPL-MCNC: 7.8 G/DL (ref 6.4–8.2)
PTH-INTACT SERPL-MCNC: 66.2 PG/ML (ref 18.4–80.1)
RBC # BLD AUTO: 4.4 MILLION/UL (ref 3.81–5.12)
SODIUM SERPL-SCNC: 140 MMOL/L (ref 136–145)
URATE SERPL-MCNC: 5.9 MG/DL (ref 2–6.8)
WBC # BLD AUTO: 5.28 THOUSAND/UL (ref 4.31–10.16)

## 2021-12-30 PROCEDURE — 36415 COLL VENOUS BLD VENIPUNCTURE: CPT

## 2021-12-30 PROCEDURE — 84550 ASSAY OF BLOOD/URIC ACID: CPT

## 2021-12-30 PROCEDURE — 83970 ASSAY OF PARATHORMONE: CPT

## 2021-12-30 PROCEDURE — 85027 COMPLETE CBC AUTOMATED: CPT

## 2021-12-30 PROCEDURE — 86255 FLUORESCENT ANTIBODY SCREEN: CPT

## 2021-12-30 PROCEDURE — 80053 COMPREHEN METABOLIC PANEL: CPT

## 2022-01-03 ENCOUNTER — TELEPHONE (OUTPATIENT)
Dept: NEPHROLOGY | Facility: CLINIC | Age: 52
End: 2022-01-03

## 2022-01-03 NOTE — TELEPHONE ENCOUNTER
Patient called and just wanted to make you aware that she will be going to [facility] emergency room due to her Vasculitis

## 2022-01-04 ENCOUNTER — TELEPHONE (OUTPATIENT)
Dept: OTHER | Facility: OTHER | Age: 52
End: 2022-01-04

## 2022-01-04 ENCOUNTER — TELEPHONE (OUTPATIENT)
Dept: NEPHROLOGY | Facility: CLINIC | Age: 52
End: 2022-01-04

## 2022-01-04 NOTE — TELEPHONE ENCOUNTER
Patient called again and would like Dr Ree Ganser to look over her lab results stated she is only getting worse and not better  Stated she can not get anymore blood work done and her PCP is unable to see her until next week

## 2022-01-04 NOTE — TELEPHONE ENCOUNTER
Noted  Per chart review, patient reached out to Nephrology and has spoken with Dr Janna Gonzalez regarding her labs  She has a follow up appointment tomorrow with Nephrology  Thanks!

## 2022-01-04 NOTE — TELEPHONE ENCOUNTER
Patient called and stated she was in and out of the emergency room 3 times yesterday  She had left and returned each time  Stated they did draw blood work and states she believes it is her Vasculitis  Patient stated there is pain under her skin in the abdomen area that feels inflamed  Patient Barely has an appetite  Patient believes she should be admitted and would like some advice from Dr Brinda Murillo

## 2022-01-05 ENCOUNTER — TELEMEDICINE (OUTPATIENT)
Dept: NEPHROLOGY | Facility: CLINIC | Age: 52
End: 2022-01-05
Payer: MEDICARE

## 2022-01-05 DIAGNOSIS — N18.4 BENIGN HYPERTENSION WITH CKD (CHRONIC KIDNEY DISEASE) STAGE IV (HCC): ICD-10-CM

## 2022-01-05 DIAGNOSIS — I12.9 BENIGN HYPERTENSION WITH CKD (CHRONIC KIDNEY DISEASE) STAGE IV (HCC): ICD-10-CM

## 2022-01-05 DIAGNOSIS — M31.7 MPA (MICROSCOPIC POLYANGIITIS) (HCC): ICD-10-CM

## 2022-01-05 DIAGNOSIS — N18.4 CKD (CHRONIC KIDNEY DISEASE) STAGE 4, GFR 15-29 ML/MIN (HCC): Primary | ICD-10-CM

## 2022-01-05 PROCEDURE — 99213 OFFICE O/P EST LOW 20 MIN: CPT | Performed by: INTERNAL MEDICINE

## 2022-01-05 PROCEDURE — 3066F NEPHROPATHY DOC TX: CPT | Performed by: PHYSICIAN ASSISTANT

## 2022-01-05 NOTE — PROGRESS NOTES
Virtual Regular Visit    Verification of patient location:    Patient is located in the following state in which I hold an active license [place]      Assessment/Plan:    1  Chronic kidney disease, stage III/4, baseline creatinine currently between 1 8-2 0, most recent creatinine 2 0   2  History of microscopic polyangiitis  Currently in remission  Repeat ANCA profile is negative  Has been off immunosuppressive medications since 2015   3  Suspicion for neuropathy  Patient apparently intolerant to gabapentin  Message per PCP in regards to other potential options   4  History of gastroparesis, overall appears stable, previously following at [facility]    Overall renal function remains fairly stable   No changes from my standpoint   Will repeat labs in 3 months and then again in 6 months with follow-up at that time  Problem List Items Addressed This Visit     None               Reason for visit is   Chief Complaint   Patient presents with    Virtual Regular Visit        Encounter provider Dot Williamson DO    Provider located at [address] [place] [id number] [phone]      Recent Visits  Date Type Provider Dept   01/04/22 Telephone Jose Lenz DO [facility]   01/03/22 Telephone Jose Lenz DO [facility] [place]   12/29/21 Office Visit [facility]   Showing recent visits within past 7 days and meeting all other requirements  Future Appointments  No visits were found meeting these conditions  Showing future appointments within next 150 days and meeting all other requirements       The patient was identified by name and date of birth  Haider Carroll was informed that this is a telemedicine visit and that the visit is being conducted through [address] [place] [address] Now and patient was informed that this is a secure, HIPAA-compliant platform  She agrees to proceed     My office door was closed  No one else was in the room  She acknowledged consent and understanding of privacy and security of the video platform  The patient has agreed to participate and understands they can discontinue the visit at any time  Patient is aware this is a billable service  Subjective  Jacqueline Ruiz is a 46 y o  female history of chronic kidney disease   She had been doing reasonably well however recently she has been complaining of significant discomfort and pain which she describes as underneath her skin  Tender to palpitation  No reported wounds or lesions  Has been using medical marijuana more frequently given her chronic pain situation  Continues to use Percocet for pain relief as well  No reports of chest pain shortness of breath or significant swelling        HPI     Past Medical History:   Diagnosis Date    ADHD     Anemia of chronic disease     Anxiety     Asthma     Asthma     [facility]     Cataplexy     Chronic abdominal pain     CKD (chronic kidney disease) stage 3, GFR 30-59 ml/min (HCC)     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HCC)     [facility] syndrome [facility]     Diabetes mellitus ([facility] )     DVT (deep venous thrombosis) (HCC)     GERD (gastroesophageal reflux disease)     History of acute pancreatitis     felt secondary to Bactrim    History of transfusion     HTN (hypertension)     Hypertension     Liver disease     fatty liver    Microscopic polyangiitis (HCC)     Morbid obesity (HCC)     MPA (microscopic polyangiitis) (HCC)     Ovarian cyst     PTSD (post-traumatic stress disorder)     Renal disorder     Self-inflicted injury     self inflicted skin wounds    Wegener's granulomatosis with renal involvement ([facility] ) 2015       Past Surgical History:   Procedure Laterality Date    COLONOSCOPY      ESOPHAGOGASTRODUODENOSCOPY  09/11/2015    mild antral gastritis    GASTRIC STIMULATOR IMPLANT SURGERY  06/25/2020    [place] COLONOSCOPY FLX DX W/COLLJ [facility] WHEN PFRMD N/A 12/14/2018    Procedure: COLONOSCOPY with polypectomy;  Surgeon: Paul Candelaria MD;  [facility]; Service: Gastroenterology    RI ESOPHAGOGASTRODUODENOSCOPY TRANSORAL DIAGNOSTIC N/A 12/14/2018    Procedure: ESOPHAGOGASTRODUODENOSCOPY (EGD) with biopsy;  Surgeon: Paul Candelaria MD;  Location[facility];   Service: Gastroenterology    RELEASE SCAR CONTRACTURE / GRAFT REPAIRS OF HAND Bilateral     UPPER GASTROINTESTINAL ENDOSCOPY  12/26/2019       Current Outpatient Medications   Medication Sig Dispense Refill    Alcohol Swabs (Alcohol Pads) 70 % PADS Use 4 (four) times a day 400 each 2    amLODIPine (NORVASC) 5 mg tablet Take 1 tablet (5 mg total) by mouth daily 90 tablet 2    amphetamine-dextroamphetamine (ADDERALL XR) 20 MG 24 hr capsule Take 1 capsule (20 mg total) by mouth 2 (two) times a day Max Daily Amount: 40 mg 60 capsule 0    atorvastatin (LIPITOR) 20 mg tablet TAKE ONE TABLET BY MOUTH ONCE DAILY 90 tablet 0    Blood Glucose Monitoring Suppl (FreeStyle Lite) DEIRDRE Use daily 1 each 0    buPROPion (WELLBUTRIN XL) 300 mg 24 hr tablet Take 300 mg by mouth every morning       clobetasol (TEMOVATE) 0 05 % cream APPLY TOPICALLY DAILY IN THE EVENING, AFTER 7 DAYS DECREASE TO 4-5 TIMES A WEEK FOR 1 WEEK THEN DECREASE TO THREE TIMES A WEEK 30 g 0    clobetasol (TEMOVATE) 0 05 % cream Apply topically 2 (two) times a day 60 g 2    conjugated estrogens (PREMARIN) vaginal cream Insert 1 g into the vagina 2 (two) times a week 30 g 12    Dexilant 60 MG capsule TAKE ONE CAPSULE BY MOUTH ONCE DAILY 30 capsule 2    diphenoxylate-atropine (LOMOTIL) 2 5-0 025 mg per tablet Take 1 tablet by mouth 4 (four) times a day as needed for diarrhea 120 tablet 4    docusate sodium (DOK) 100 mg capsule Take 1 capsule (100 mg total) by mouth 2 (two) times a day 180 capsule 1    Easy Comfort Lancets MISC USE TO TEST THE BLOOD SUGAR THREE TIMES A  each 10    glucose blood (FREESTYLE LITE) test strip Use as instructed 100 strip 5    hydrocortisone 1 % cream APPLY TOPICALLY TO AFFECTED AREA FOUR TIMES A DAY AS NEEDED FOR IRRITATION 56 g 0    insulin aspart (NovoLOG FlexPen) 100 UNIT/ML injection pen Inject 6 Units under the skin 3 (three) times a day with meals 15 mL 2    insulin glargine (Toujeo Max SoloStar) 300 units/mL CONCENTRATED U-300 injection pen (2-unit dial) Inject 22 Units under the skin daily 9 mL 2    Insulin Pen Needle (PEN NEEDLES) 31G X 8 MM MISC Inject 1 Stick under the skin 4 (four) times a day (with meals and at bedtime) 100 each 6    lidocaine (XYLOCAINE) 5 % ointment APPLY TOPICALLY 2GM TWICE A DAY TO AFFECTED AREAS AS NEEDED FOR MILD PAIN 250 g 8    Lidocaine Viscous HCl (XYLOCAINE) 2 % mucosal solution Swish and spit 15 mL 4 (four) times a day as needed for mouth pain or discomfort 15 mL 1    linaCLOtide (Linzess) 72 MCG CAPS Take 72 mcg by mouth daily 90 capsule 3    montelukast (SINGULAIR) 10 mg tablet TAKE ONE TABLET BY MOUTH ONCE DAILY AT BEDTIME 90 tablet 0    Movantik 25 MG tablet       Nutritional Supplements (Ensure Original) LIQD Take 1 Bottle by mouth 2 (two) times a day (Patient not taking: Reported on 12/23/2021 ) 60 Bottle 11    ondansetron (ZOFRAN) 4 mg tablet Take 1 tablet (4 mg total) by mouth every 8 (eight) hours as needed for nausea or vomiting 30 tablet 0    oxyCODONE (ROXICODONE) 10 MG TABS Take 1 tablet (10 mg total) by mouth every 4 (four) hours as needed for moderate pain Max Daily Amount: 60 mg 180 tablet 0    pantoprazole (PROTONIX) 40 mg tablet Take 1 tablet (40 mg total) by mouth 2 (two) times a day before meals 60 tablet 5    Probiotic Product (PROBIOTIC-10 PO) Take 1 tablet by mouth daily      promethazine (PHENERGAN) 25 mg tablet Take 1 tablet (25 mg total) by mouth every 6 (six) hours as needed for nausea or vomiting (Patient not taking: Reported on 12/23/2021 ) 60 tablet 3    QUEtiapine (SEROquel) 200 mg tablet Take 150 mg by mouth daily at bedtime       scopolamine (TRANSDERM-SCOP) 1 5 mg/3 days TD 72 hr patch Place 1 patch on the skin every third day 10 patch 0    sucralfate (CARAFATE) 1 g/10 mL suspension Take 10 mL (1 g total) by mouth 4 (four) times a day 420 mL 0     No current facility-administered medications for this visit  Allergies   Allergen Reactions    Prozac [Fluoxetine Hcl]      SI    Bactrim [Sulfamethoxazole-Trimethoprim]      Pt "They think that is what cause the pancreatitis"     Flagyl [Metronidazole] Diarrhea and Abdominal Pain    Lamictal [Lamotrigine] GI Intolerance    Lithium Other (See Comments)    Haldol [Haloperidol] Other (See Comments)     "I don't like it"    Ibuprofen     Lexapro [Escitalopram Oxalate] Rash    Navane [Thiothixene]      SI    Other      "novaine?" antipsychotic       Review of Systems   Respiratory: Negative for shortness of breath  Cardiovascular: Negative for leg swelling  Musculoskeletal: Positive for arthralgias  Neurological: Positive for numbness  Video Exam    There were no vitals filed for this visit  Physical Exam  HENT:      Head: Normocephalic and atraumatic  Eyes:      General: No scleral icterus  Pulmonary:      Effort: Pulmonary effort is normal    Abdominal:      General: There is no distension  Musculoskeletal:      Comments: No facial edema noted   Skin:     Findings: No rash  Neurological:      Mental Status: She is alert and oriented to person, place, and time  I spent 15 minutes directly with the patient during this visit    VIRTUAL VISIT DISCLAIMER      Jeanne Melchor verbally agrees to participate in [facility]  Pt is aware that [facility] could be limited without vital signs or the ability to perform a full hands-on physical Anamaria Slaughter understands she or the provider may request at any time to terminate the video visit and request the patient to seek care or treatment in person

## 2022-01-06 ENCOUNTER — TELEPHONE (OUTPATIENT)
Dept: FAMILY MEDICINE CLINIC | Facility: CLINIC | Age: 52
End: 2022-01-06

## 2022-01-13 ENCOUNTER — HOSPITAL ENCOUNTER (OUTPATIENT)
Dept: RADIOLOGY | Facility: MEDICAL CENTER | Age: 52
Discharge: HOME | End: 2022-01-13
Attending: PHYSICAL MEDICINE & REHABILITATION | Admitting: PHYSICAL MEDICINE & REHABILITATION
Payer: MEDICARE

## 2022-01-13 VITALS
SYSTOLIC BLOOD PRESSURE: 109 MMHG | TEMPERATURE: 97.3 F | HEART RATE: 55 BPM | DIASTOLIC BLOOD PRESSURE: 71 MMHG | OXYGEN SATURATION: 98 % | RESPIRATION RATE: 18 BRPM

## 2022-01-13 DIAGNOSIS — M54.2 NECK PAIN: ICD-10-CM

## 2022-01-13 DIAGNOSIS — M48.02 CERVICAL SPINAL STENOSIS: ICD-10-CM

## 2022-01-13 DIAGNOSIS — M50.120 CERVICAL DISC DISORDER WITH RADICULOPATHY OF MID-CERVICAL REGION: ICD-10-CM

## 2022-01-13 PROCEDURE — 62321 NJX INTERLAMINAR CRV/THRC: CPT | Performed by: PHYSICAL MEDICINE & REHABILITATION

## 2022-01-13 RX ORDER — METHYLPREDNISOLONE ACETATE 80 MG/ML
80 INJECTION, SUSPENSION INTRA-ARTICULAR; INTRALESIONAL; INTRAMUSCULAR; PARENTERAL; SOFT TISSUE ONCE
Status: COMPLETED | OUTPATIENT
Start: 2022-01-13 | End: 2022-01-13

## 2022-01-13 RX ADMIN — IOHEXOL 1 ML: 300 INJECTION, SOLUTION INTRAVENOUS at 08:22

## 2022-01-13 RX ADMIN — METHYLPREDNISOLONE ACETATE 80 MG: 80 INJECTION, SUSPENSION INTRA-ARTICULAR; INTRALESIONAL; INTRAMUSCULAR; PARENTERAL; SOFT TISSUE at 08:23

## 2022-01-13 NOTE — H&P
History of Present Illness:  The patient is a 46 y o  female who presents with complaints of neck pain    Patient Active Problem List   Diagnosis    Acute renal failure superimposed on stage 3 chronic kidney disease (HCC)    Type 2 diabetes mellitus with hyperglycemia, with long-term current use of insulin (HCC)    Dyslipidemia    Granulomatosis with polyangiitis (HCC)    MPA (microscopic polyangiitis) (HCC)    Asthma    Benign hypertension with CKD (chronic kidney disease) stage IV (HCC)    Chronic right shoulder pain    Noncompliance    Bipolar 1 disorder (HCC)    Intractable abdominal pain    Epigastric pain    Pancreatitis    Ovarian cyst    Postherpetic neuralgia    Anemia of chronic disease    Arthralgia of multiple joints    Cataplexy    Weakness of both lower extremities    Diarrhea of presumed infectious origin    Chronic pelvic pain in female    Plantar wart, right foot    Seasonal allergic rhinitis due to pollen    Gastroesophageal reflux disease    Weakness of both upper extremities    Persistent proteinuria    Left leg pain    Controlled substance agreement signed    Chronic narcotic use    Small fiber neuropathy    IBS (irritable bowel syndrome)    Leukocytosis    Gastroparesis    Hyperosmia    Smell disturbance    Contusion of left hip    Trochanteric bursitis of left hip    Neuropathy    Attention deficit hyperactivity disorder (ADHD)    Elevated blood pressure reading    Costochondritis    Hearing difficulty of both ears    Vaginal atrophy    Alpha-hemolytic Streptococcus positive urine culture    Nausea and vomiting    Smoking    Schizo-affective schizophrenia ([address]    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[address] )    CKD (chronic kidney disease)    Cervical radiculopathy    Right arm numbness    Fall at home, initial encounter    Sinus bradycardia    Marijuana use    Difficulty ventilating with mask    Low back pain radiating to left lower extremity    Lump of skin of back       Past Medical History:   Diagnosis Date    ADHD     Anemia of chronic disease     Anxiety     Asthma     Asthma     Borderline personality disorder (HCC)     Cataplexy     Chronic abdominal pain     CKD (chronic kidney disease) stage 3, GFR 30-59 ml/min (HCC)     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     Cushing syndrome ([facility]     [facility]     DVT (deep venous thrombosis) (HCC)     GERD (gastroesophageal reflux disease)     History of acute pancreatitis     felt secondary to Bactrim    History of transfusion     HTN (hypertension)     Hypertension     Liver disease     fatty liver    Microscopic polyangiitis (HCC)     Morbid obesity (HCC)     MPA (microscopic polyangiitis) (HCC)     Ovarian cyst     PTSD (post-traumatic stress disorder)     Renal disorder     Self-inflicted injury     self inflicted skin wounds    [facility]gener's granulomatosis with [facility] involvement ([facility] ) 2015       Past Surgical History:   Procedure Laterality Date    COLONOSCOPY      ESOPHAGOGASTRODUODENOSCOPY  09/11/2015    mild antral gastritis    GASTRIC STIMULATOR IMPLANT SURGERY  06/25/2020    SD COLONOSCOPY FLX DX W/COLLJ SPEC WHEN PFRMD N/A 12/14/2018    Procedure: COLONOSCOPY with polypectomy;  Surgeon: Chucho Zhong MD;  [facility]; Service: Gastroenterology    SD ESOPHAGOGASTRODUODENOSCOPY TRANSORAL DIAGNOSTIC N/A 12/14/2018    Procedure: ESOPHAGOGASTRODUODENOSCOPY (EGD) with biopsy;  Surgeon: Chucho Zhong MD;  [facility]: [facility];   Service: Gastroenterology    RELEASE SCAR CONTRACTURE / GRAFT REPAIRS OF HAND Bilateral     UPPER GASTROINTESTINAL ENDOSCOPY  12/26/2019         Current Outpatient Medications:     Alcohol Swabs (Alcohol Pads) 70 % PADS, Use 4 (four) times a day, Disp: 400 each, Rfl: 2    amLODIPine (NORVASC) 5 mg tablet, Take 1 tablet (5 mg total) by mouth daily, Disp: 90 tablet, Rfl: 2    amphetamine-dextroamphetamine (ADDERALL XR) 20 MG 24 hr capsule, Take 1 capsule (20 mg total) by mouth 2 (two) times a day Max Daily Amount: 40 mg, Disp: 60 capsule, Rfl: 0    atorvastatin (LIPITOR) 20 mg tablet, TAKE ONE TABLET BY MOUTH ONCE DAILY, Disp: 90 tablet, Rfl: 0    Blood Glucose Monitoring Suppl (FreeStyle Lite) DEIRDRE, Use daily, Disp: 1 each, Rfl: 0    buPROPion (WELLBUTRIN XL) 300 mg 24 hr tablet, Take 300 mg by mouth every morning , Disp: , Rfl:     clobetasol (TEMOVATE) 0 05 % cream, APPLY TOPICALLY DAILY IN THE EVENING, AFTER 7 DAYS DECREASE TO 4-5 TIMES A WEEK FOR 1 WEEK THEN DECREASE TO THREE TIMES A WEEK, Disp: 30 g, Rfl: 0    clobetasol (TEMOVATE) 0 05 % cream, Apply topically 2 (two) times a day, Disp: 60 g, Rfl: 2    conjugated estrogens (PREMARIN) vaginal cream, Insert 1 g into the vagina 2 (two) times a week, Disp: 30 g, Rfl: 12    Dexilant 60 MG capsule, TAKE ONE CAPSULE BY MOUTH ONCE DAILY, Disp: 30 capsule, Rfl: 2    diphenoxylate-atropine (LOMOTIL) 2 5-0 025 mg per tablet, Take 1 tablet by mouth 4 (four) times a day as needed for diarrhea, Disp: 120 tablet, Rfl: 4    docusate sodium (DOK) 100 mg capsule, Take 1 capsule (100 mg total) by mouth 2 (two) times a day, Disp: 180 capsule, Rfl: 1    Easy Comfort Lancets MISC, USE TO TEST THE BLOOD SUGAR THREE TIMES A DAY, Disp: 300 each, Rfl: 10    glucose blood (FREESTYLE LITE) test strip, Use as instructed, Disp: 100 strip, Rfl: 5    hydrocortisone 1 % cream, APPLY TOPICALLY TO AFFECTED AREA FOUR TIMES A DAY AS NEEDED FOR IRRITATION, Disp: 56 g, Rfl: 0    insulin aspart (NovoLOG FlexPen) 100 UNIT/ML injection pen, Inject 6 Units under the skin 3 (three) times a day with meals, Disp: 15 mL, Rfl: 2    insulin glargine (Toujeo Max SoloStar) 300 units/mL CONCENTRATED U-300 injection pen (2-unit dial), Inject 22 Units under the skin daily, Disp: 9 mL, Rfl: 2    Insulin Pen Needle (PEN NEEDLES) 31G X 8 MM MISC, Inject 1 Stick under the skin 4 (four) times a day (with meals and at bedtime), Disp: 100 each, Rfl: 6    lidocaine (XYLOCAINE) 5 % ointment, APPLY TOPICALLY 2GM TWICE A DAY TO AFFECTED AREAS AS NEEDED FOR MILD PAIN, Disp: 250 g, Rfl: 8    Lidocaine Viscous HCl (XYLOCAINE) 2 % mucosal solution, Swish and spit 15 mL 4 (four) times a day as needed for mouth pain or discomfort, Disp: 15 mL, Rfl: 1    linaCLOtide (Linzess) 72 MCG CAPS, Take 72 mcg by mouth daily, Disp: 90 capsule, Rfl: 3    montelukast (SINGULAIR) 10 mg tablet, TAKE ONE TABLET BY MOUTH ONCE DAILY AT BEDTIME, Disp: 90 tablet, Rfl: 0    Movantik 25 MG tablet, , Disp: , Rfl:     Nutritional Supplements (Ensure Original) LIQD, Take 1 Bottle by mouth 2 (two) times a day (Patient not taking: Reported on 12/23/2021 ), Disp: 60 Bottle, Rfl: 11    ondansetron (ZOFRAN) 4 mg tablet, Take 1 tablet (4 mg total) by mouth every 8 (eight) hours as needed for nausea or vomiting, Disp: 30 tablet, Rfl: 0    oxyCODONE (ROXICODONE) 10 MG TABS, Take 1 tablet (10 mg total) by mouth every 4 (four) hours as needed for moderate pain Max Daily Amount: 60 mg, Disp: 180 tablet, Rfl: 0    pantoprazole (PROTONIX) 40 mg tablet, Take 1 tablet (40 mg total) by mouth 2 (two) times a day before meals, Disp: 60 tablet, Rfl: 5    Probiotic Product (PROBIOTIC-10 PO), Take 1 tablet by mouth daily, Disp: , Rfl:     promethazine (PHENERGAN) 25 mg tablet, Take 1 tablet (25 mg total) by mouth every 6 (six) hours as needed for nausea or vomiting (Patient not taking: Reported on 12/23/2021 ), Disp: 60 tablet, Rfl: 3    QUEtiapine (SEROquel) 200 mg tablet, Take 150 mg by mouth daily at bedtime , Disp: , Rfl:     scopolamine (TRANSDERM-SCOP) 1 5 mg/3 days TD 72 hr patch, Place 1 patch on the skin every third day, Disp: 10 patch, Rfl: 0    sucralfate (CARAFATE) 1 g/10 mL suspension, Take 10 mL (1 g total) by mouth 4 (four) times a day, Disp: 420 mL, Rfl: 0    Current Facility-Administered Medications:     iohexol (OMNIPAQUE) 300 mg/mL injection 50 mL, 50 mL, Epidural, Once, Samaria Greer Chinedu Marshall, DO    methylPREDNISolone acetate (DEPO-MEDROL) injection 80 mg, 80 mg, Epidural, Once, Stephanie Lujan, DO    Allergies   Allergen Reactions    Prozac [Fluoxetine Hcl]      SI    Bactrim [Sulfamethoxazole-Trimethoprim]      Pt "They think that is what cause the pancreatitis"     Flagyl [Metronidazole] Diarrhea and Abdominal Pain    Lamictal [Lamotrigine] GI Intolerance    Lithium Other (See Comments)    Haldol [Haloperidol] Other (See Comments)     "I don't like it"    Ibuprofen     Lexapro [Escitalopram Oxalate] Rash    Navane [Thiothixene]      SI    Other      "novaine?" antipsychotic       Physical Exam:   Vitals:    01/13/22 0809   BP: 115/77   Pulse: 66   Resp: 18   Temp: (!) 97 3 °F (36 3 °C)   SpO2: 97%     General: Awake, Alert, Oriented x 3, Mood and affect appropriate  Respiratory: Respirations even and unlabored  Cardiovascular: Peripheral pulses intact; no edema  Musculoskeletal Exam:  Tenderness to palpation bilateral cervical paraspinals  ASA Score: 2    Patient/Chart Verification  Patient ID Verified: Verbal  ID Band Applied: No  Consents Confirmed: Procedural,To be obtained in the Pre-Procedure area  H&P( within 30 days) Verified: To be obtained in the Pre-Procedure area  Interval H&P(within 24 hr) Complete (required for Outpatients and Surgery Admit only): To be obtained in the Pre-Procedure area  Allergies Reviewed: Yes  Anticoag/NSAID held?: NA (Pt denies taking any blood thinningmedications or NSAIDS)  Currently on antibiotics?: No  Pregnancy denied?: Yes    Assessment:   1  Cervical disc disorder with radiculopathy of mid-cervical region    2  Neck pain    3   Cervical spinal stenosis        Plan: YANNI

## 2022-01-13 NOTE — DISCHARGE INSTRUCTIONS
Epidural Steroid Injection   WHAT YOU NEED TO KNOW:   An epidural steroid injection (DUC) is a procedure to inject steroid medicine into the epidural space  The epidural space is between your spinal cord and vertebrae  Steroids reduce inflammation and fluid buildup in your spine that may be causing pain  You may be given pain medicine along with the steroids  ACTIVITY  · Do not drive or operate machinery today  · No strenuous activity today - bending, lifting, etc   · You may resume normal activites starting tomorrow - start slowly and as tolerated  · You may shower today, but no tub baths or hot tubs  · You may have numbness for several hours from the local anesthetic  Please use caution and common sense, especially with weight-bearing activities  CARE OF THE INJECTION SITE  · If you have soreness or pain, apply ice to the area today (20 minutes on/20 minutes off)  · Starting tomorrow, you may use warm, moist heat or ice if needed  · You may have an increase or change in your discomfort for 36-48 hours after your treatment  · Apply ice and continue with any pain medication you have been prescribed  · [facility]ify [facility] if you have any of the following: redness, drainage, swelling, headache, stiff neck or fever above 100°F     SPECIAL INSTRUCTIONS  · Our office will contact you in approximately 7 days for a progress report  MEDICATIONS  · Continue to take all routine medications  · Our office may have instructed you to hold some medications  As no general anesthesia was used in today's procedure, you should not experience any side effects related to anesthesia  If you have a problem specifically related to your procedure, please call our office at [phone]  Problems not related to your procedure should be directed to your primary care physician

## 2022-01-17 DIAGNOSIS — J30.1 SEASONAL ALLERGIC RHINITIS DUE TO POLLEN: ICD-10-CM

## 2022-01-17 RX ORDER — MONTELUKAST SODIUM 10 MG/1
TABLET ORAL
Qty: 90 TABLET | Refills: 0 | Status: SHIPPED | OUTPATIENT
Start: 2022-01-17 | End: 2022-03-11 | Stop reason: ALTCHOICE

## 2022-01-18 ENCOUNTER — APPOINTMENT (OUTPATIENT)
Dept: LAB | Facility: CLINIC | Age: 52
End: 2022-01-18
Payer: MEDICARE

## 2022-01-18 ENCOUNTER — TELEPHONE (OUTPATIENT)
Dept: FAMILY MEDICINE CLINIC | Facility: CLINIC | Age: 52
End: 2022-01-18

## 2022-01-18 ENCOUNTER — OFFICE VISIT (OUTPATIENT)
Dept: FAMILY MEDICINE CLINIC | Facility: CLINIC | Age: 52
End: 2022-01-18

## 2022-01-18 VITALS
TEMPERATURE: 97.5 F | BODY MASS INDEX: 25.76 KG/M2 | HEIGHT: 62 IN | DIASTOLIC BLOOD PRESSURE: 80 MMHG | OXYGEN SATURATION: 98 % | WEIGHT: 140 LBS | SYSTOLIC BLOOD PRESSURE: 122 MMHG | HEART RATE: 69 BPM | RESPIRATION RATE: 16 BRPM

## 2022-01-18 DIAGNOSIS — G62.9 NEUROPATHY: Primary | ICD-10-CM

## 2022-01-18 DIAGNOSIS — F32.A DEPRESSION, UNSPECIFIED DEPRESSION TYPE: ICD-10-CM

## 2022-01-18 DIAGNOSIS — F11.20 CONTINUOUS OPIOID DEPENDENCE (HCC): ICD-10-CM

## 2022-01-18 DIAGNOSIS — Z91.018 MULTIPLE FOOD ALLERGIES: ICD-10-CM

## 2022-01-18 DIAGNOSIS — F25.9 SCHIZO-AFFECTIVE SCHIZOPHRENIA (HCC): ICD-10-CM

## 2022-01-18 DIAGNOSIS — N18.4 CHRONIC KIDNEY DISEASE, STAGE 4 (SEVERE) (HCC): ICD-10-CM

## 2022-01-18 DIAGNOSIS — K21.9 GASTROESOPHAGEAL REFLUX DISEASE WITHOUT ESOPHAGITIS: ICD-10-CM

## 2022-01-18 PROCEDURE — 3008F BODY MASS INDEX DOCD: CPT | Performed by: PHYSICAL MEDICINE & REHABILITATION

## 2022-01-18 PROCEDURE — 3074F SYST BP LT 130 MM HG: CPT | Performed by: PHYSICIAN ASSISTANT

## 2022-01-18 PROCEDURE — 82785 ASSAY OF IGE: CPT

## 2022-01-18 PROCEDURE — 3008F BODY MASS INDEX DOCD: CPT | Performed by: PHYSICIAN ASSISTANT

## 2022-01-18 PROCEDURE — 86003 ALLG SPEC IGE CRUDE XTRC EA: CPT

## 2022-01-18 PROCEDURE — 99214 OFFICE O/P EST MOD 30 MIN: CPT | Performed by: PHYSICIAN ASSISTANT

## 2022-01-18 PROCEDURE — 3079F DIAST BP 80-89 MM HG: CPT | Performed by: PHYSICIAN ASSISTANT

## 2022-01-18 RX ORDER — BUPROPION HYDROCHLORIDE 300 MG/1
300 TABLET ORAL EVERY MORNING
Qty: 30 TABLET | Refills: 2 | Status: SHIPPED | OUTPATIENT
Start: 2022-01-18

## 2022-01-18 RX ORDER — PANTOPRAZOLE SODIUM 40 MG/1
40 TABLET, DELAYED RELEASE ORAL
Qty: 180 TABLET | Refills: 1 | Status: SHIPPED | OUTPATIENT
Start: 2022-01-18

## 2022-01-18 RX ORDER — DULOXETIN HYDROCHLORIDE 30 MG/1
30 CAPSULE, DELAYED RELEASE ORAL DAILY
Qty: 30 CAPSULE | Refills: 2 | Status: SHIPPED | OUTPATIENT
Start: 2022-01-18 | End: 2022-02-28 | Stop reason: SINTOL

## 2022-01-18 NOTE — LETTER
January 18, 2022     Patient: Mihir Sharp   YOB: 1970   Date of Visit: 1/18/2022       To Whom it May Concern:    Mihir Sharp is under my professional care  She was seen in my office on 1/18/2022  Due to patient's medical conditions, it is warranted for patient to wear a face shield instead of a face mask  If you have any questions or concerns, please don't hesitate to call           Sincerely,          Katarzyna Thrasher PA-C        CC: No Recipients

## 2022-01-18 NOTE — TELEPHONE ENCOUNTER
[id number] Andrea erlin form received by fax on 01/18/2022  to be completed by PCP  Copy made and placed in PCP folder  Forms to be delivered to PCP mailbox at assigned time      CPT/DX Denied: [id number] ([id number])

## 2022-01-19 DIAGNOSIS — F31.9 BIPOLAR 1 DISORDER (HCC): ICD-10-CM

## 2022-01-19 PROBLEM — F11.20 CONTINUOUS OPIOID DEPENDENCE (HCC): Status: ACTIVE | Noted: 2022-01-19

## 2022-01-19 PROBLEM — N18.4 CHRONIC KIDNEY DISEASE, STAGE 4 (SEVERE) (HCC): Status: ACTIVE | Noted: 2022-01-19

## 2022-01-19 PROBLEM — F32.A DEPRESSION: Status: ACTIVE | Noted: 2022-01-19

## 2022-01-19 LAB
ALLERGEN COMMENT: ABNORMAL
ALMOND IGE QN: <0.1 KUA/I
CASHEW NUT IGE QN: <0.1 KUA/I
CODFISH IGE QN: <0.1 KUA/I
EGG WHITE IGE QN: <0.1 KUA/I
GLUTEN IGE QN: <0.1 KUA/I
HAZELNUT IGE QN: 0.44 KUA/L
MILK IGE QN: <0.1 KUA/I
PEANUT IGE QN: <0.1 KUA/I
SALMON IGE QN: <0.1 KUA/I
SCALLOP IGE QN: <0.1 KUA/L
SESAME SEED IGE QN: <0.1 KUA/I
SHRIMP IGE QN: 0.24 KUA/L
SOYBEAN IGE QN: <0.1 KUA/I
TOTAL IGE SMQN RAST: 76.1 KU/L (ref 0–113)
TUNA IGE QN: <0.1 KUA/I
WALNUT IGE QN: <0.1 KUA/I
WHEAT IGE QN: <0.1 KUA/I

## 2022-01-20 DIAGNOSIS — F31.9 BIPOLAR 1 DISORDER (HCC): ICD-10-CM

## 2022-01-20 NOTE — ASSESSMENT & PLAN NOTE
- Patient is currently in the process of finding a new therapist and psychiatrist   Patient now has a ICM who is helping her with this process  In the meantime, patient requires refills of her medication   - Will refill Wellbutrin  mg, to be taken once daily

## 2022-01-20 NOTE — ASSESSMENT & PLAN NOTE
- Currently uncontrolled  Will increase Protonix to 40 mg twice daily   - Advised patient to contact Gastroenterology to schedule follow-up appointment for further evaluation

## 2022-01-20 NOTE — ASSESSMENT & PLAN NOTE
- Patient had previously tried gabapentin, but it caused restless legs syndrome   - Will trial Cymbalta 30 mg daily  Advised patient there is room for increase in dosage if necessary

## 2022-01-21 RX ORDER — DEXTROAMPHETAMINE SACCHARATE, AMPHETAMINE ASPARTATE MONOHYDRATE, DEXTROAMPHETAMINE SULFATE AND AMPHETAMINE SULFATE 5; 5; 5; 5 MG/1; MG/1; MG/1; MG/1
20 CAPSULE, EXTENDED RELEASE ORAL 2 TIMES DAILY
Qty: 60 CAPSULE | Refills: 0 | OUTPATIENT
Start: 2022-01-21

## 2022-01-21 RX ORDER — DEXTROAMPHETAMINE SACCHARATE, AMPHETAMINE ASPARTATE MONOHYDRATE, DEXTROAMPHETAMINE SULFATE AND AMPHETAMINE SULFATE 5; 5; 5; 5 MG/1; MG/1; MG/1; MG/1
20 CAPSULE, EXTENDED RELEASE ORAL 2 TIMES DAILY
Qty: 60 CAPSULE | Refills: 0 | Status: SHIPPED | OUTPATIENT
Start: 2022-01-21 | End: 2022-02-20 | Stop reason: SDUPTHER

## 2022-01-25 DIAGNOSIS — G89.4 CHRONIC PAIN SYNDROME: ICD-10-CM

## 2022-01-25 DIAGNOSIS — M31.31 GRANULOMATOSIS WITH POLYANGIITIS WITH RENAL INVOLVEMENT (HCC): Chronic | ICD-10-CM

## 2022-01-25 DIAGNOSIS — K31.84 GASTROPARESIS: ICD-10-CM

## 2022-01-25 RX ORDER — ONDANSETRON 4 MG/1
4 TABLET, FILM COATED ORAL EVERY 8 HOURS PRN
Qty: 30 TABLET | Refills: 0 | Status: SHIPPED | OUTPATIENT
Start: 2022-01-25 | End: 2022-03-29 | Stop reason: DRUGHIGH

## 2022-01-26 ENCOUNTER — TELEPHONE (OUTPATIENT)
Dept: FAMILY MEDICINE CLINIC | Facility: CLINIC | Age: 52
End: 2022-01-26

## 2022-01-26 DIAGNOSIS — Z79.4 TYPE 2 DIABETES MELLITUS WITHOUT COMPLICATION, WITH LONG-TERM CURRENT USE OF INSULIN (HCC): ICD-10-CM

## 2022-01-26 DIAGNOSIS — E11.9 TYPE 2 DIABETES MELLITUS WITHOUT COMPLICATION, WITH LONG-TERM CURRENT USE OF INSULIN (HCC): ICD-10-CM

## 2022-01-26 RX ORDER — OXYCODONE HYDROCHLORIDE 10 MG/1
10 TABLET ORAL EVERY 4 HOURS PRN
Qty: 180 TABLET | Refills: 0 | Status: SHIPPED | OUTPATIENT
Start: 2022-01-26 | End: 2022-02-21 | Stop reason: SDUPTHER

## 2022-01-26 NOTE — TELEPHONE ENCOUNTER
Patient called stating she had a reaction to her cymbalta  She would like provider to give her a call to see what else she can take       Please advise

## 2022-01-27 RX ORDER — BLOOD-GLUCOSE METER
KIT MISCELLANEOUS
Qty: 100 STRIP | Refills: 0 | Status: SHIPPED | OUTPATIENT
Start: 2022-01-27

## 2022-01-28 ENCOUNTER — TELEPHONE (OUTPATIENT)
Dept: FAMILY MEDICINE CLINIC | Facility: CLINIC | Age: 52
End: 2022-01-28

## 2022-01-28 NOTE — TELEPHONE ENCOUNTER
PT WANTS A CALL FROM THE PROVIDER  SHE STATES SHE HAS CALLED  SEVERAL TIMES AND LEFT MESSAGES AND NO ONE HAS CONTACT HER

## 2022-01-29 ENCOUNTER — PATIENT MESSAGE (OUTPATIENT)
Dept: FAMILY MEDICINE CLINIC | Facility: CLINIC | Age: 52
End: 2022-01-29

## 2022-01-29 DIAGNOSIS — G62.9 NEUROPATHY: Primary | ICD-10-CM

## 2022-01-31 RX ORDER — PREGABALIN 50 MG/1
50 CAPSULE ORAL 2 TIMES DAILY
Qty: 60 CAPSULE | Refills: 1 | Status: SHIPPED | OUTPATIENT
Start: 2022-01-31 | End: 2022-04-28 | Stop reason: SDUPTHER

## 2022-01-31 NOTE — TELEPHONE ENCOUNTER
Noted  Please advise patient we can try lyrica as an alternative medication  If patient agrees, I will send to her pharmacy and will discontinue the cymbalta  Thanks!

## 2022-01-31 NOTE — TELEPHONE ENCOUNTER
Patient agrees on sending her lyrica   Please let me know when it gets sent to pharmacy so I can notify patient

## 2022-02-01 NOTE — TELEPHONE ENCOUNTER
From: Yina Ibarra  To: Candance Gamble, PA-C  Sent: 1/29/2022 7:51 AM EST  Subject: Wyline Phalen and Cymbalta    I left 3 messages  The  left 2 messages and not one person has returned any of the messages  Its been 6 days and no call  I believe I am being ignored deliberately as I have left messages for the nurse who never ever returned any messages via voice  I would like to make a complaint  And need the Cymbalta changed to whatever medication it was that we had discussed that if Cymbalta did not work you would put me on  It is very Unprofessional for a nurse to ignore 5 messages in one week even from her own staff  I got very very very sick   And no one returned a call  Very unprofessional and I am very frustrated and disappointed  Please rectify this  I want to make a complaint about the nurse

## 2022-02-04 ENCOUNTER — TELEPHONE (OUTPATIENT)
Dept: FAMILY MEDICINE CLINIC | Facility: CLINIC | Age: 52
End: 2022-02-04

## 2022-02-04 NOTE — TELEPHONE ENCOUNTER
[employer] form received by fax on 2/4/22  to be completed by PCP  Copy made and placed in PCP folder  Forms to be delivered to PCP mailbox at assigned time

## 2022-02-07 ENCOUNTER — NURSE TRIAGE (OUTPATIENT)
Dept: OTHER | Facility: OTHER | Age: 52
End: 2022-02-07

## 2022-02-07 DIAGNOSIS — Z20.828 SARS-ASSOCIATED CORONAVIRUS EXPOSURE: Primary | ICD-10-CM

## 2022-02-08 ENCOUNTER — OFFICE VISIT (OUTPATIENT)
Dept: FAMILY MEDICINE CLINIC | Facility: CLINIC | Age: 52
End: 2022-02-08

## 2022-02-08 DIAGNOSIS — B34.9 VIRAL ILLNESS: Primary | ICD-10-CM

## 2022-02-08 PROCEDURE — 3074F SYST BP LT 130 MM HG: CPT | Performed by: FAMILY MEDICINE

## 2022-02-08 PROCEDURE — 3078F DIAST BP <80 MM HG: CPT | Performed by: FAMILY MEDICINE

## 2022-02-08 PROCEDURE — 99213 OFFICE O/P EST LOW 20 MIN: CPT | Performed by: FAMILY MEDICINE

## 2022-02-08 NOTE — PROGRESS NOTES
COVID-19 Outpatient Progress Note    Assessment/Plan:    Problem List Items Addressed This Visit     None      Visit Diagnoses     Viral illness    -  Primary         Disposition:     I recommended self-quarantine for 10 days and to watch for symptoms until 14 days after exposure  If patient were to develop symptoms, they should self isolate and call our office for further guidance  I have spent 9 minutes directly with the patient  Encounter provider Ilir Martinez MD    Provider located at [address] [id number] [phone]    Recent Visits  Date Type Provider Dept   02/08/22 Office Visit MD Ladonna Toro [facility]   02/04/22 Telephone HOLLIS Hensley [facility]   Showing recent visits within past 7 days and meeting all other requirements  Future Appointments  No visits were found meeting these conditions  Showing future appointments within next 150 days and meeting all other requirements     This virtual check-in was done via telephone and she agrees to proceed  Patient agrees to participate in a virtual check in via telephone or video visit instead of presenting to the office to address urgent/immediate medical needs  Patient is aware this is a billable service  After connecting through Telephone, the patient was identified by name and date of birth  Manoj Aleman was informed that this was a telemedicine visit and that the exam was being conducted confidentially over secure lines  My office door was closed  No one else was in the room  Manoj Aleman acknowledged consent and understanding of privacy and security of the telemedicine visit  I informed the patient that I have reviewed her record in Epic and presented the opportunity for her to ask any questions regarding the visit today  The patient agreed to participate      Verification of patient location:  Patient is located in the following state in which I hold an active license: [place]    Subjective:   Rajani John is a 46 y o  female who is concerned about COVID-19  Patient's symptoms include nasal congestion, rhinorrhea, anosmia, loss of taste, cough, nausea, vomiting and headache  Patient denies fever      - Date of symptom onset: 2/2/2022      COVID-19 vaccination status: Not vaccinated    Exposure:   Contact with a person who is under investigation (PUI) for or who is positive for COVID-19 within the last 14 days?: Yes    Hospitalized recently for fever and/or lower respiratory symptoms?: No      Currently a healthcare worker that is involved in direct patient care?: No      Works in a special setting where the risk of COVID-19 transmission may be high? (this may include long-term care, correctional and FDC facilities; homeless shelters; assisted-living facilities and group homes ): No      Resident in a special setting where the risk of COVID-19 transmission may be high? (this may include long-term care, correctional and FDC facilities; homeless shelters; assisted-living facilities and group homes ): No      Pt visited the ER on 2/3/21 with a severe headache, but nwo realizes that it may have been Covid sx because her home health aide is Covid positive 2/6/22  Pt has similar sx as her  Today is Day 7 of sx       Lab Results   Component Value Date    SARSCOV2 Negative 04/29/2021    SARSCOV2 Negative 06/17/2020     Past Medical History:   Diagnosis Date    ADHD     Anemia of chronic disease     Anxiety     Asthma     Borderline personality disorder (HCC)     Cataplexy     Chronic abdominal pain     CKD (chronic kidney disease) stage 3, GFR 30-59 ml/min (HCC)     Cushing syndrome (HCC)     Diabetes mellitus ([address] )     DVT (deep venous thrombosis) (HCC)     GERD (gastroesophageal reflux disease)     History of acute pancreatitis     felt secondary to Bactrim    History of transfusion     Hypertension     Liver disease fatty liver    Microscopic polyangiitis (HCC)     Ovarian cyst     PTSD (post-traumatic stress disorder)     Self-inflicted injury     self inflicted skin wounds    Wegener's granulomatosis with renal involvement ([facility] [address] ) 2015     Past Surgical History:   Procedure Laterality Date    ESOPHAGOGASTRODUODENOSCOPY  09/11/2015    mild antral gastritis    GASTRIC STIMULATOR IMPLANT SURGERY  06/25/2020    MI COLONOSCOPY FLX DX W/COLLJ SPEC WHEN PFRMD N/A 12/14/2018    adenoma removed from the transverse, hyperplastic polyp removed from the left colon    MI ESOPHAGOGASTRODUODENOSCOPY TRANSORAL DIAGNOSTIC N/A 12/14/2018    gastritis and scant coffee-ground material   Biopsies negative for H  pylori    RELEASE SCAR CONTRACTURE / GRAFT REPAIRS OF HAND Bilateral     UPPER GASTROINTESTINAL ENDOSCOPY  12/26/2019    Dr Cornelia Mcfadden   Botox to the pylorus     Current Outpatient Medications   Medication Sig Dispense Refill    Alcohol Swabs (Alcohol Pads) 70 % PADS Use 4 (four) times a day 400 each 2    amphetamine-dextroamphetamine (ADDERALL XR) 20 MG 24 hr capsule Take 1 capsule (20 mg total) by mouth 2 (two) times a day Max Daily Amount: 40 mg 60 capsule 0    Blood Glucose Monitoring Suppl (FreeStyle Lite) DEIRDRE Use daily 1 each 0    buPROPion (WELLBUTRIN XL) 300 mg 24 hr tablet Take 1 tablet (300 mg total) by mouth every morning 30 tablet 2    clobetasol (TEMOVATE) 0 05 % cream Apply topically 2 (two) times a day 60 g 2    DULoxetine (Cymbalta) 30 mg delayed release capsule Take 1 capsule (30 mg total) by mouth daily 30 capsule 2    Easy Comfort Lancets MISC USE TO TEST THE BLOOD SUGAR THREE TIMES A  each 10    glucose blood (FREESTYLE LITE) test strip Use as instructed 100 strip 0    hydrOXYzine HCL (ATARAX) 25 mg tablet       insulin aspart (NovoLOG FlexPen) 100 UNIT/ML injection pen Inject 6 Units under the skin 3 (three) times a day with meals 15 mL 2    insulin glargine (Toujeo Max SoloStar) 300 units/mL CONCENTRATED U-300 injection pen (2-unit dial) Inject 22 Units under the skin daily 9 mL 2    Insulin Pen Needle (PEN NEEDLES) 31G X 8 MM MISC Inject 1 Stick under the skin 4 (four) times a day (with meals and at bedtime) 100 each 6    lidocaine (XYLOCAINE) 5 % ointment APPLY TOPICALLY 2GM TWICE A DAY TO AFFECTED AREAS AS NEEDED FOR MILD PAIN 250 g 8    Lidocaine Viscous HCl (XYLOCAINE) 2 % mucosal solution Swish and spit 15 mL 4 (four) times a day as needed for mouth pain or discomfort 15 mL 1    linaCLOtide (Linzess) 72 MCG CAPS Take 72 mcg by mouth daily 90 capsule 3    montelukast (SINGULAIR) 10 mg tablet TAKE ONE TABLET BY MOUTH ONCE DAILY AT BEDTIME 90 tablet 0    ondansetron (ZOFRAN) 4 mg tablet Take 1 tablet (4 mg total) by mouth every 8 (eight) hours as needed for nausea or vomiting 30 tablet 0    oxyCODONE (ROXICODONE) 10 MG TABS Take 1 tablet (10 mg total) by mouth every 4 (four) hours as needed for moderate pain Max Daily Amount: 60 mg 180 tablet 0    pantoprazole (PROTONIX) 40 mg tablet Take 1 tablet (40 mg total) by mouth 2 (two) times a day before meals 180 tablet 1    pregabalin (LYRICA) 50 mg capsule Take 1 capsule (50 mg total) by mouth 2 (two) times a day 60 capsule 1    Probiotic Product (PROBIOTIC-10 PO) Take 1 tablet by mouth daily      promethazine (PHENERGAN) 25 mg tablet Take 1 tablet (25 mg total) by mouth every 6 (six) hours as needed for nausea or vomiting (Patient not taking: Reported on 12/23/2021 ) 60 tablet 3    Restasis 0 05 % ophthalmic emulsion       scopolamine (TRANSDERM-SCOP) 1 5 mg/3 days TD 72 hr patch Place 1 patch on the skin every third day 10 patch 0    sucralfate (CARAFATE) 1 g/10 mL suspension Take 10 mL (1 g total) by mouth 3 (three) times a day 500 mL 0     No current facility-administered medications for this visit       Allergies   Allergen Reactions    Prozac [Fluoxetine Hcl]      SI    Bactrim [Sulfamethoxazole-Trimethoprim]      Pt "They think that is what cause the pancreatitis"     Flagyl [Metronidazole] Diarrhea and Abdominal Pain    Lamictal [Lamotrigine] GI Intolerance    Lithium Other (See Comments)    Haldol [Haloperidol] Other (See Comments)     "I don't like it"    Ibuprofen     Lexapro [Escitalopram Oxalate] Rash    Navane [Thiothixene]      SI    Other      "novaine?" antipsychotic       Review of Systems   Constitutional: Negative for fever  HENT: Positive for congestion and rhinorrhea  Respiratory: Positive for cough  Gastrointestinal: Positive for nausea and vomiting  Neurological: Positive for headaches  Objective: There were no vitals filed for this visit  Physical Exam  Neurological:      Mental Status: She is alert and oriented to person, place, and time  Psychiatric:         Mood and Affect: Mood normal          Thought Content: Thought content normal          VIRTUAL VISIT DISCLAIMER    Robert Ventura verbally agrees to participate in [employer]  Pt is aware that [employer] could be limited without vital signs or the ability to perform a full hands-on physical Cristiane Moreau understands she or the provider may request at any time to terminate the video visit and request the patient to seek care or treatment in person

## 2022-02-08 NOTE — TELEPHONE ENCOUNTER
Reason for Disposition   [1] COVID-19 infection suspected by caller or triager AND [2] mild symptoms (cough, fever, or others) AND [3] has not gotten tested yet    Answer Assessment - Initial Assessment Questions  Were you within 6 feet or less, for up to 15 minutes or more with a person that has a confirmed COVID-19 test? yes  What was the date of your exposure?  Household contact  Are you experiencing any symptoms attributed to the virus?  (Assess for SOB, cough, fever, difficulty breathing) lost sense of taste, congestion, fatigue, headache  HIGH RISK: Do you have any history heart or lung conditions, weakened immune system, diabetes, Asthma, CHF, HIV, COPD, Chemo, renal failure, sickle cell, etc? yes  PREGNANCY: Are you pregnant or did you recently give birth? no  VACCINE: "Have you gotten the COVID-19 vaccine?" If Yes ask: "Which one, how many shots, when did you get it?" no    Protocols used: CORONAVIRUS (COVID-19) DIAGNOSED OR SUSPECTED-ADULT-AH

## 2022-02-08 NOTE — TELEPHONE ENCOUNTER
Regarding: covid symptomatic exposure   ----- Message from Carlos Munoz sent at 2/7/2022  7:17 PM EST -----  " I have a headache and Im very fatigued since yesterday   I was exposed to my home health aid who tested positive  "

## 2022-02-13 ENCOUNTER — HOSPITAL ENCOUNTER (INPATIENT)
Facility: HOSPITAL | Age: 52
LOS: 1 days | Discharge: HOME HEALTH | DRG: 073 | End: 2022-02-15
Attending: EMERGENCY MEDICINE | Admitting: INTERNAL MEDICINE
Payer: MEDICARE

## 2022-02-13 ENCOUNTER — APPOINTMENT (EMERGENCY)
Dept: CT IMAGING | Facility: HOSPITAL | Age: 52
DRG: 073 | End: 2022-02-13
Payer: MEDICARE

## 2022-02-13 DIAGNOSIS — U07.1 COVID: Primary | ICD-10-CM

## 2022-02-13 DIAGNOSIS — M31.30 GRANULOMATOSIS WITH POLYANGIITIS, UNSPECIFIED WHETHER RENAL INVOLVEMENT (HCC): ICD-10-CM

## 2022-02-13 DIAGNOSIS — R10.13 EPIGASTRIC ABDOMINAL PAIN: ICD-10-CM

## 2022-02-13 DIAGNOSIS — R79.89 ELEVATED LACTIC ACID LEVEL: ICD-10-CM

## 2022-02-13 DIAGNOSIS — R11.2 NAUSEA VOMITING AND DIARRHEA: ICD-10-CM

## 2022-02-13 DIAGNOSIS — N18.9 CKD (CHRONIC KIDNEY DISEASE): ICD-10-CM

## 2022-02-13 DIAGNOSIS — I10 HTN (HYPERTENSION): ICD-10-CM

## 2022-02-13 DIAGNOSIS — R19.7 NAUSEA VOMITING AND DIARRHEA: ICD-10-CM

## 2022-02-13 LAB
2HR DELTA HS TROPONIN: -1 NG/L
ALBUMIN SERPL BCP-MCNC: 4.1 G/DL (ref 3.5–5)
ALP SERPL-CCNC: 118 U/L (ref 46–116)
ALT SERPL W P-5'-P-CCNC: 28 U/L (ref 12–78)
ANION GAP SERPL CALCULATED.3IONS-SCNC: 15 MMOL/L (ref 4–13)
APTT PPP: 37 SECONDS (ref 23–37)
AST SERPL W P-5'-P-CCNC: 18 U/L (ref 5–45)
ATRIAL RATE: 47 BPM
ATRIAL RATE: 56 BPM
BACTERIA UR QL AUTO: ABNORMAL /HPF
BASOPHILS # BLD AUTO: 0.03 THOUSANDS/ΜL (ref 0–0.1)
BASOPHILS NFR BLD AUTO: 0 % (ref 0–1)
BILIRUB SERPL-MCNC: 0.21 MG/DL (ref 0.2–1)
BILIRUB UR QL STRIP: NEGATIVE
BUN SERPL-MCNC: 35 MG/DL (ref 5–25)
CALCIUM SERPL-MCNC: 9.5 MG/DL (ref 8.3–10.1)
CARDIAC TROPONIN I PNL SERPL HS: 4 NG/L
CARDIAC TROPONIN I PNL SERPL HS: 5 NG/L
CHLORIDE SERPL-SCNC: 109 MMOL/L (ref 100–108)
CLARITY UR: CLEAR
CO2 SERPL-SCNC: 19 MMOL/L (ref 21–32)
COLOR UR: YELLOW
CREAT SERPL-MCNC: 2.02 MG/DL (ref 0.6–1.3)
EOSINOPHIL # BLD AUTO: 0.02 THOUSAND/ΜL (ref 0–0.61)
EOSINOPHIL NFR BLD AUTO: 0 % (ref 0–6)
ERYTHROCYTE [DISTWIDTH] IN BLOOD BY AUTOMATED COUNT: 12.6 % (ref 11.6–15.1)
FLUAV RNA RESP QL NAA+PROBE: NEGATIVE
FLUBV RNA RESP QL NAA+PROBE: NEGATIVE
GFR SERPL CREATININE-BSD FRML MDRD: 27 ML/MIN/1.73SQ M
GLUCOSE SERPL-MCNC: 125 MG/DL (ref 65–140)
GLUCOSE SERPL-MCNC: 138 MG/DL (ref 65–140)
GLUCOSE SERPL-MCNC: 90 MG/DL (ref 65–140)
GLUCOSE UR STRIP-MCNC: NEGATIVE MG/DL
HCT VFR BLD AUTO: 44.7 % (ref 34.8–46.1)
HGB BLD-MCNC: 15 G/DL (ref 11.5–15.4)
HGB UR QL STRIP.AUTO: ABNORMAL
IMM GRANULOCYTES # BLD AUTO: 0.04 THOUSAND/UL (ref 0–0.2)
IMM GRANULOCYTES NFR BLD AUTO: 0 % (ref 0–2)
INR PPP: 0.92 (ref 0.84–1.19)
KETONES UR STRIP-MCNC: NEGATIVE MG/DL
LACTATE SERPL-SCNC: 0.9 MMOL/L (ref 0.5–2)
LACTATE SERPL-SCNC: 4.7 MMOL/L (ref 0.5–2)
LEUKOCYTE ESTERASE UR QL STRIP: NEGATIVE
LIPASE SERPL-CCNC: 468 U/L (ref 73–393)
LYMPHOCYTES # BLD AUTO: 1.38 THOUSANDS/ΜL (ref 0.6–4.47)
LYMPHOCYTES NFR BLD AUTO: 12 % (ref 14–44)
MCH RBC QN AUTO: 30.7 PG (ref 26.8–34.3)
MCHC RBC AUTO-ENTMCNC: 33.6 G/DL (ref 31.4–37.4)
MCV RBC AUTO: 91 FL (ref 82–98)
MONOCYTES # BLD AUTO: 0.57 THOUSAND/ΜL (ref 0.17–1.22)
MONOCYTES NFR BLD AUTO: 5 % (ref 4–12)
NEUTROPHILS # BLD AUTO: 9.25 THOUSANDS/ΜL (ref 1.85–7.62)
NEUTS SEG NFR BLD AUTO: 83 % (ref 43–75)
NITRITE UR QL STRIP: NEGATIVE
NON-SQ EPI CELLS URNS QL MICRO: ABNORMAL /HPF
NRBC BLD AUTO-RTO: 0 /100 WBCS
P AXIS: -84 DEGREES
P AXIS: 213 DEGREES
PH UR STRIP.AUTO: 5.5 [PH] (ref 4.5–8)
PLATELET # BLD AUTO: 317 THOUSANDS/UL (ref 149–390)
PMV BLD AUTO: 10.7 FL (ref 8.9–12.7)
POTASSIUM SERPL-SCNC: 4.5 MMOL/L (ref 3.5–5.3)
PR INTERVAL: 152 MS
PR INTERVAL: 160 MS
PROT SERPL-MCNC: 8.4 G/DL (ref 6.4–8.2)
PROT UR STRIP-MCNC: >=300 MG/DL
PROTHROMBIN TIME: 12.2 SECONDS (ref 11.6–14.5)
QRS AXIS: 21 DEGREES
QRS AXIS: 27 DEGREES
QRSD INTERVAL: 60 MS
QRSD INTERVAL: 66 MS
QT INTERVAL: 426 MS
QT INTERVAL: 470 MS
QTC INTERVAL: 411 MS
QTC INTERVAL: 415 MS
RBC # BLD AUTO: 4.89 MILLION/UL (ref 3.81–5.12)
RBC #/AREA URNS AUTO: ABNORMAL /HPF
RSV RNA RESP QL NAA+PROBE: NEGATIVE
SARS-COV-2 RNA RESP QL NAA+PROBE: POSITIVE
SODIUM SERPL-SCNC: 143 MMOL/L (ref 136–145)
SP GR UR STRIP.AUTO: 1.02 (ref 1–1.03)
T WAVE AXIS: 47 DEGREES
T WAVE AXIS: 52 DEGREES
UROBILINOGEN UR QL STRIP.AUTO: 0.2 E.U./DL
VENTRICULAR RATE: 47 BPM
VENTRICULAR RATE: 56 BPM
WBC # BLD AUTO: 11.29 THOUSAND/UL (ref 4.31–10.16)
WBC #/AREA URNS AUTO: ABNORMAL /HPF

## 2022-02-13 PROCEDURE — 80053 COMPREHEN METABOLIC PANEL: CPT | Performed by: PHYSICIAN ASSISTANT

## 2022-02-13 PROCEDURE — 0241U HB NFCT DS VIR RESP RNA 4 TRGT: CPT | Performed by: PHYSICIAN ASSISTANT

## 2022-02-13 PROCEDURE — 99220 PR INITIAL OBSERVATION CARE/DAY 70 MINUTES: CPT | Performed by: STUDENT IN AN ORGANIZED HEALTH CARE EDUCATION/TRAINING PROGRAM

## 2022-02-13 PROCEDURE — 96375 TX/PRO/DX INJ NEW DRUG ADDON: CPT

## 2022-02-13 PROCEDURE — 99222 1ST HOSP IP/OBS MODERATE 55: CPT | Performed by: SURGERY

## 2022-02-13 PROCEDURE — 96374 THER/PROPH/DIAG INJ IV PUSH: CPT

## 2022-02-13 PROCEDURE — C9113 INJ PANTOPRAZOLE SODIUM, VIA: HCPCS | Performed by: PHYSICIAN ASSISTANT

## 2022-02-13 PROCEDURE — 93005 ELECTROCARDIOGRAM TRACING: CPT

## 2022-02-13 PROCEDURE — 93010 ELECTROCARDIOGRAM REPORT: CPT | Performed by: INTERNAL MEDICINE

## 2022-02-13 PROCEDURE — 74176 CT ABD & PELVIS W/O CONTRAST: CPT

## 2022-02-13 PROCEDURE — 36415 COLL VENOUS BLD VENIPUNCTURE: CPT | Performed by: PHYSICIAN ASSISTANT

## 2022-02-13 PROCEDURE — 99285 EMERGENCY DEPT VISIT HI MDM: CPT | Performed by: PHYSICIAN ASSISTANT

## 2022-02-13 PROCEDURE — G1004 CDSM NDSC: HCPCS

## 2022-02-13 PROCEDURE — 85730 THROMBOPLASTIN TIME PARTIAL: CPT | Performed by: PHYSICIAN ASSISTANT

## 2022-02-13 PROCEDURE — 99285 EMERGENCY DEPT VISIT HI MDM: CPT

## 2022-02-13 PROCEDURE — 85025 COMPLETE CBC W/AUTO DIFF WBC: CPT | Performed by: PHYSICIAN ASSISTANT

## 2022-02-13 PROCEDURE — 85610 PROTHROMBIN TIME: CPT | Performed by: PHYSICIAN ASSISTANT

## 2022-02-13 PROCEDURE — 83605 ASSAY OF LACTIC ACID: CPT | Performed by: PHYSICIAN ASSISTANT

## 2022-02-13 PROCEDURE — 83690 ASSAY OF LIPASE: CPT | Performed by: PHYSICIAN ASSISTANT

## 2022-02-13 PROCEDURE — 81001 URINALYSIS AUTO W/SCOPE: CPT

## 2022-02-13 PROCEDURE — 82948 REAGENT STRIP/BLOOD GLUCOSE: CPT

## 2022-02-13 PROCEDURE — 96361 HYDRATE IV INFUSION ADD-ON: CPT

## 2022-02-13 PROCEDURE — 99221 1ST HOSP IP/OBS SF/LOW 40: CPT | Performed by: PHYSICIAN ASSISTANT

## 2022-02-13 PROCEDURE — 84484 ASSAY OF TROPONIN QUANT: CPT | Performed by: PHYSICIAN ASSISTANT

## 2022-02-13 RX ORDER — HYDROMORPHONE HCL/PF 1 MG/ML
0.5 SYRINGE (ML) INJECTION ONCE
Status: COMPLETED | OUTPATIENT
Start: 2022-02-13 | End: 2022-02-13

## 2022-02-13 RX ORDER — SODIUM CHLORIDE 9 MG/ML
125 INJECTION, SOLUTION INTRAVENOUS CONTINUOUS
Status: DISCONTINUED | OUTPATIENT
Start: 2022-02-13 | End: 2022-02-15 | Stop reason: HOSPADM

## 2022-02-13 RX ORDER — ONDANSETRON 2 MG/ML
4 INJECTION INTRAMUSCULAR; INTRAVENOUS ONCE
Status: COMPLETED | OUTPATIENT
Start: 2022-02-13 | End: 2022-02-13

## 2022-02-13 RX ORDER — METOCLOPRAMIDE HYDROCHLORIDE 5 MG/ML
10 INJECTION INTRAMUSCULAR; INTRAVENOUS ONCE
Status: COMPLETED | OUTPATIENT
Start: 2022-02-13 | End: 2022-02-13

## 2022-02-13 RX ORDER — PANTOPRAZOLE SODIUM 40 MG/1
40 INJECTION, POWDER, FOR SOLUTION INTRAVENOUS EVERY 12 HOURS SCHEDULED
Status: DISCONTINUED | OUTPATIENT
Start: 2022-02-13 | End: 2022-02-15 | Stop reason: HOSPADM

## 2022-02-13 RX ORDER — INSULIN GLARGINE 100 [IU]/ML
22 INJECTION, SOLUTION SUBCUTANEOUS EVERY MORNING
Status: DISCONTINUED | OUTPATIENT
Start: 2022-02-13 | End: 2022-02-14

## 2022-02-13 RX ORDER — SACCHAROMYCES BOULARDII 250 MG
250 CAPSULE ORAL 2 TIMES DAILY
Status: DISCONTINUED | OUTPATIENT
Start: 2022-02-13 | End: 2022-02-15 | Stop reason: HOSPADM

## 2022-02-13 RX ORDER — HYDROMORPHONE HCL/PF 1 MG/ML
0.5 SYRINGE (ML) INJECTION EVERY 4 HOURS PRN
Status: DISCONTINUED | OUTPATIENT
Start: 2022-02-13 | End: 2022-02-15 | Stop reason: HOSPADM

## 2022-02-13 RX ORDER — OXYCODONE HYDROCHLORIDE 10 MG/1
10 TABLET ORAL EVERY 4 HOURS PRN
Status: DISCONTINUED | OUTPATIENT
Start: 2022-02-13 | End: 2022-02-15 | Stop reason: HOSPADM

## 2022-02-13 RX ORDER — ONDANSETRON 2 MG/ML
4 INJECTION INTRAMUSCULAR; INTRAVENOUS EVERY 4 HOURS PRN
Status: DISCONTINUED | OUTPATIENT
Start: 2022-02-13 | End: 2022-02-15 | Stop reason: HOSPADM

## 2022-02-13 RX ORDER — SUCRALFATE 1 G/1
1 TABLET ORAL 3 TIMES DAILY
Status: DISCONTINUED | OUTPATIENT
Start: 2022-02-13 | End: 2022-02-15 | Stop reason: HOSPADM

## 2022-02-13 RX ORDER — CALCIUM CARBONATE 200(500)MG
1000 TABLET,CHEWABLE ORAL DAILY PRN
Status: DISCONTINUED | OUTPATIENT
Start: 2022-02-13 | End: 2022-02-15 | Stop reason: HOSPADM

## 2022-02-13 RX ORDER — SCOLOPAMINE TRANSDERMAL SYSTEM 1 MG/1
1 PATCH, EXTENDED RELEASE TRANSDERMAL
Status: DISCONTINUED | OUTPATIENT
Start: 2022-02-13 | End: 2022-02-15 | Stop reason: HOSPADM

## 2022-02-13 RX ORDER — ACETAMINOPHEN 325 MG/1
650 TABLET ORAL EVERY 6 HOURS PRN
Status: DISCONTINUED | OUTPATIENT
Start: 2022-02-13 | End: 2022-02-15 | Stop reason: HOSPADM

## 2022-02-13 RX ORDER — PREGABALIN 50 MG/1
50 CAPSULE ORAL 2 TIMES DAILY
Status: DISCONTINUED | OUTPATIENT
Start: 2022-02-13 | End: 2022-02-15 | Stop reason: HOSPADM

## 2022-02-13 RX ORDER — AMLODIPINE BESYLATE 5 MG/1
5 TABLET ORAL DAILY
Status: DISCONTINUED | OUTPATIENT
Start: 2022-02-14 | End: 2022-02-15 | Stop reason: HOSPADM

## 2022-02-13 RX ORDER — HEPARIN SODIUM 5000 [USP'U]/ML
5000 INJECTION, SOLUTION INTRAVENOUS; SUBCUTANEOUS EVERY 8 HOURS SCHEDULED
Status: DISCONTINUED | OUTPATIENT
Start: 2022-02-13 | End: 2022-02-15 | Stop reason: HOSPADM

## 2022-02-13 RX ORDER — MONTELUKAST SODIUM 10 MG/1
10 TABLET ORAL
Status: DISCONTINUED | OUTPATIENT
Start: 2022-02-13 | End: 2022-02-15 | Stop reason: HOSPADM

## 2022-02-13 RX ORDER — LIDOCAINE HYDROCHLORIDE 20 MG/ML
15 SOLUTION OROPHARYNGEAL 4 TIMES DAILY PRN
Status: DISCONTINUED | OUTPATIENT
Start: 2022-02-13 | End: 2022-02-15 | Stop reason: HOSPADM

## 2022-02-13 RX ORDER — HYDRALAZINE HYDROCHLORIDE 20 MG/ML
5 INJECTION INTRAMUSCULAR; INTRAVENOUS EVERY 6 HOURS PRN
Status: DISCONTINUED | OUTPATIENT
Start: 2022-02-13 | End: 2022-02-15 | Stop reason: HOSPADM

## 2022-02-13 RX ORDER — BUPROPION HYDROCHLORIDE 150 MG/1
300 TABLET ORAL EVERY MORNING
Status: DISCONTINUED | OUTPATIENT
Start: 2022-02-13 | End: 2022-02-15 | Stop reason: HOSPADM

## 2022-02-13 RX ADMIN — SODIUM CHLORIDE 1000 ML: 0.9 INJECTION, SOLUTION INTRAVENOUS at 07:28

## 2022-02-13 RX ADMIN — HYDROMORPHONE HYDROCHLORIDE 0.5 MG: 1 INJECTION, SOLUTION INTRAMUSCULAR; INTRAVENOUS; SUBCUTANEOUS at 10:51

## 2022-02-13 RX ADMIN — OXYCODONE HYDROCHLORIDE 10 MG: 10 TABLET ORAL at 19:47

## 2022-02-13 RX ADMIN — MONTELUKAST 10 MG: 10 TABLET, FILM COATED ORAL at 21:41

## 2022-02-13 RX ADMIN — ONDANSETRON 4 MG: 2 INJECTION INTRAMUSCULAR; INTRAVENOUS at 10:52

## 2022-02-13 RX ADMIN — ACETAMINOPHEN 650 MG: 325 TABLET, FILM COATED ORAL at 19:47

## 2022-02-13 RX ADMIN — SODIUM CHLORIDE 125 ML/HR: 0.9 INJECTION, SOLUTION INTRAVENOUS at 19:48

## 2022-02-13 RX ADMIN — HYDROMORPHONE HYDROCHLORIDE 0.5 MG: 1 INJECTION, SOLUTION INTRAMUSCULAR; INTRAVENOUS; SUBCUTANEOUS at 15:00

## 2022-02-13 RX ADMIN — SODIUM CHLORIDE 125 ML/HR: 0.9 INJECTION, SOLUTION INTRAVENOUS at 12:28

## 2022-02-13 RX ADMIN — ONDANSETRON 4 MG: 2 INJECTION INTRAMUSCULAR; INTRAVENOUS at 07:27

## 2022-02-13 RX ADMIN — HEPARIN SODIUM 5000 UNITS: 5000 INJECTION INTRAVENOUS; SUBCUTANEOUS at 15:01

## 2022-02-13 RX ADMIN — PANTOPRAZOLE SODIUM 40 MG: 40 INJECTION, POWDER, FOR SOLUTION INTRAVENOUS at 12:28

## 2022-02-13 RX ADMIN — HEPARIN SODIUM 5000 UNITS: 5000 INJECTION INTRAVENOUS; SUBCUTANEOUS at 21:41

## 2022-02-13 RX ADMIN — SUCRALFATE 1 G: 1 TABLET ORAL at 21:41

## 2022-02-13 RX ADMIN — Medication 250 MG: at 17:52

## 2022-02-13 RX ADMIN — SCOPALAMINE 1 PATCH: 1 PATCH, EXTENDED RELEASE TRANSDERMAL at 12:28

## 2022-02-13 RX ADMIN — INSULIN GLARGINE 22 UNITS: 100 INJECTION, SOLUTION SUBCUTANEOUS at 12:28

## 2022-02-13 RX ADMIN — METOCLOPRAMIDE 10 MG: 5 INJECTION, SOLUTION INTRAMUSCULAR; INTRAVENOUS at 08:16

## 2022-02-13 RX ADMIN — PREGABALIN 50 MG: 50 CAPSULE ORAL at 17:51

## 2022-02-13 RX ADMIN — HYDROMORPHONE HYDROCHLORIDE 0.5 MG: 1 INJECTION, SOLUTION INTRAMUSCULAR; INTRAVENOUS; SUBCUTANEOUS at 07:27

## 2022-02-13 RX ADMIN — SUCRALFATE 1 G: 1 TABLET ORAL at 17:52

## 2022-02-13 RX ADMIN — PANTOPRAZOLE SODIUM 40 MG: 40 INJECTION, POWDER, FOR SOLUTION INTRAVENOUS at 21:41

## 2022-02-13 NOTE — ASSESSMENT & PLAN NOTE
Lab Results   Component Value Date    EGFR 27 02/13/2022    EGFR 30 12/30/2021    EGFR 39 12/13/2021    CREATININE 2 02 (H) 02/13/2022    CREATININE 1 88 (H) 12/30/2021    CREATININE 1 54 (H) 12/13/2021

## 2022-02-13 NOTE — H&P
[address] [facility]  H&P- Rajani John 1970, 46 y o  female MRN: [id number]  Unit/Bed#: [facility] 68 2 -01 Encounter: [id number]  Primary Care Provider: Teri Mcneil PA-C   Date and time admitted to hospital: 2/13/2022  7:09 AM    * Intractable abdominal pain  Assessment & Plan  · Patient said the ED with complaint of intractable of abdominal pain with nausea and vomiting  · Does have history of gastroparesis, may be exacerbated  · Does have elevated lactic acid, IV fluids and trend  · GI consult, recommended general surgery consult to rule out ischemia  · Aggressive IV fluid hydration, p r n  Analgesics  · Keep NPO with p r n  Antiemetics, advance as tolerated pending Gen surg consult    CKD (chronic kidney disease)  Assessment & Plan  Lab Results   Component Value Date    EGFR 27 02/13/2022    EGFR 30 12/30/2021    EGFR 39 12/13/2021    CREATININE 2 02 (H) 02/13/2022    CREATININE 1 88 (H) 12/30/2021    CREATININE 1 54 (H) 12/13/2021     · History of CKD stage 3/4  · Stable baseline 2 02  · IV fluids and continued monitoring while inpatient    Gastroparesis  Assessment & Plan  · Status post laparoscopic pyloroplasty with gastric stimulator implanted at [facility]  · Continue Protonix, Carafate  · GI following    Bipolar 1 disorder (HCC)  Assessment & Plan  · Continue Wellbutrin    Benign hypertension with CKD (chronic kidney disease) stage IV (HCC)  Assessment & Plan  · BP elevated at time of admission  · Continue Norvasc 5 mg daily  · P r n  Hydralazine    Granulomatosis with polyangiitis ([facility] [address] )  Assessment & Plan  · History of Justa's granulomatosis    Type 2 diabetes mellitus with hyperglycemia, with long-term current use of insulin ([facility] [address] )  Assessment & Plan  Lab Results   Component Value Date    HGBA1C 5 7 09/29/2021       No results for input(s): POCGLU in the last 72 hours      Blood Sugar Average: Last 72 hrs:  ·  maintain the patient on Lantus 22 units q a m , continue while inpatient  · Hold mealtime insulin while NPO  · Place on sliding scale insulin with Accu-Cheks    Acute renal failure superimposed on stage 3 chronic kidney disease (HCC)-resolved as of 2/13/2022  Assessment & Plan  Lab Results   Component Value Date    EGFR 27 02/13/2022    EGFR 30 12/30/2021    EGFR 39 12/13/2021    CREATININE 2 02 (H) 02/13/2022    CREATININE 1 88 (H) 12/30/2021    CREATININE 1 54 (H) 12/13/2021         VTE Pharmacologic Prophylaxis: VTE Score: 3 Moderate Risk (Score 3-4) - Pharmacological DVT Prophylaxis Ordered: heparin  Code Status: Level 1 - Full Code   Discussion with family: None  Anticipated Length of Stay: Patient will be admitted on an observation basis with an anticipated length of stay of less than 2 midnights secondary to Intractable abdominal pain  Total Time for Visit, including Counseling / Coordination of Care: 60 minutes Greater than 50% of this total time spent on direct patient counseling and coordination of care  Chief Complaint:  Abdominal pain    History of Present Illness:  Linda Vieira is a 46 y o  female with a PMH of Justa's granulomatosis, CKD, hypertension GERD, diabetes who presents with abdominal pain  Patient presents the ED with abdominal pain since this morning  She states she woke up with severe upper abdominal pain  Does known history of gastroparesis as well as history of pancreatitis  She reports intractable nausea and vomiting  Does report some relief with IV Dilaudid in the emergency room but reports her pain returned quickly  She notes continued nausea after Zofran and Reglan  She follows outpatient with gastro associates as well as [facility] for her gastroparesis  Patient also tested positive for COVID-19  Note she has been sick for a few days with headaches, congestion and loss of taste  She denies any shortness of breath, coughing or fevers  She does report positive COVID contact      Review of Systems:  Review of Systems   Constitutional: Negative for chills and fever  HENT: Positive for congestion  Negative for trouble swallowing  Eyes: Negative for visual disturbance  Respiratory: Negative for cough and shortness of breath  Gastrointestinal: Positive for abdominal pain, nausea and vomiting  Genitourinary: Negative for difficulty urinating  Musculoskeletal: Positive for back pain  Skin: Negative for rash  Neurological: Negative for dizziness and weakness  Psychiatric/Behavioral: Negative for sleep disturbance  Past Medical and Surgical History:   Past Medical History:   Diagnosis Date    ADHD     Anemia of chronic disease     Anxiety     Asthma     Borderline personality disorder (HCC)     Cataplexy     Chronic abdominal pain     CKD (chronic kidney disease) stage 3, GFR 30-59 ml/min (HCC)     Cushing syndrome (HCC)     Diabetes mellitus ([facility] [address] )     DVT (deep venous thrombosis) (HCC)     GERD (gastroesophageal reflux disease)     History of acute pancreatitis     felt secondary to Bactrim    History of transfusion     Hypertension     Liver disease     fatty liver    Microscopic polyangiitis (HCC)     Ovarian cyst     PTSD (post-traumatic stress disorder)     Self-inflicted injury     self inflicted skin wounds    Wegener's granulomatosis with renal involvement ([facility] ) 2015       Past Surgical History:   Procedure Laterality Date    ESOPHAGOGASTRODUODENOSCOPY  09/11/2015    mild antral gastritis    GASTRIC STIMULATOR IMPLANT SURGERY  06/25/2020    MD COLONOSCOPY FLX DX W/COLLJ SPEC WHEN PFRMD N/A 12/14/2018    adenoma removed from the transverse, hyperplastic polyp removed from the left colon    MD ESOPHAGOGASTRODUODENOSCOPY TRANSORAL DIAGNOSTIC N/A 12/14/2018    gastritis and scant coffee-ground material   Biopsies negative for H  pylori    RELEASE SCAR CONTRACTURE / GRAFT REPAIRS OF HAND Bilateral     UPPER GASTROINTESTINAL ENDOSCOPY  12/26/2019    Dr Prema Fernandez   Botox to the pylorus       Meds/Allergies:  Prior to Admission medications    Medication Sig Start Date End Date Taking?  Authorizing Provider   Alcohol Swabs (Alcohol Pads) 70 % PADS Use 4 (four) times a day 12/1/21   Katarzyna Thrasher PA-C   amphetamine-dextroamphetamine (ADDERALL XR) 20 MG 24 hr capsule Take 1 capsule (20 mg total) by mouth 2 (two) times a day Max Daily Amount: 40 mg 1/21/22   Katarzyna Thrasher PA-C   Blood Glucose Monitoring Suppl (FreeStyle Lite) DEIRDRE Use daily 7/23/21   Katarzyna Thrasher PA-C   buPROPion (WELLBUTRIN XL) 300 mg 24 hr tablet Take 1 tablet (300 mg total) by mouth every morning 1/18/22   Katarzyna Thrasher PA-C   clobetasol (TEMOVATE) 0 05 % cream Apply topically 2 (two) times a day 12/29/21   Katarzyna Thrasher PA-C   DULoxetine (Cymbalta) 30 mg delayed release capsule Take 1 capsule (30 mg total) by mouth daily 1/18/22   Katarzyna Thrasher PA-C   Easy Comfort Lancets MISC USE TO TEST THE BLOOD SUGAR THREE TIMES A DAY 3/1/21   Katarzyna Thrasher PA-C   glucose blood (FREESTYLE LITE) test strip Use as instructed 1/27/22   Katarzyna Thrasher PA-C   hydrOXYzine HCL (ATARAX) 25 mg tablet  1/14/22   Historical Provider, MD   insulin aspart (NovoLOG FlexPen) 100 UNIT/ML injection pen Inject 6 Units under the skin 3 (three) times a day with meals 12/27/21   Katarzyna Thrasher PA-C   insulin glargine (Toujeo Max SoloStar) 300 units/mL CONCENTRATED U-300 injection pen (2-unit dial) Inject 22 Units under the skin daily 12/27/21   Katarzyna Thrasher PA-C   Insulin Pen Needle (PEN NEEDLES) 31G X 8 MM MISC Inject 1 Stick under the skin 4 (four) times a day (with meals and at bedtime) 3/28/18   Tres Leiva PA-C   lidocaine (XYLOCAINE) 5 % ointment APPLY TOPICALLY 2GM TWICE A DAY TO AFFECTED AREAS AS NEEDED FOR MILD PAIN 12/10/20   Katarzyna Thrasher PA-C   Lidocaine Viscous HCl (XYLOCAINE) 2 % mucosal solution Swish and spit 15 mL 4 (four) times a day as needed for mouth pain or discomfort 7/16/21   Beckie Rayo MD Raoul   linaCLOtide (Linzess) 72 MCG CAPS Take 72 mcg by mouth daily 2/18/21   Evan Wheeler PA-C   montelukast (SINGULAIR) 10 mg tablet TAKE ONE TABLET BY MOUTH ONCE DAILY AT BEDTIME 1/17/22   Katarzyna Thrasher PA-C   ondansetron (ZOFRAN) 4 mg tablet Take 1 tablet (4 mg total) by mouth every 8 (eight) hours as needed for nausea or vomiting 1/25/22   Katarzyna Thrasher PA-C   oxyCODONE (ROXICODONE) 10 MG TABS Take 1 tablet (10 mg total) by mouth every 4 (four) hours as needed for moderate pain Max Daily Amount: 60 mg 1/26/22   Katarzyna Thrasher PA-C   pantoprazole (PROTONIX) 40 mg tablet Take 1 tablet (40 mg total) by mouth 2 (two) times a day before meals 1/18/22   Katarzyna Thrasher PA-C   pregabalin (LYRICA) 50 mg capsule Take 1 capsule (50 mg total) by mouth 2 (two) times a day 1/31/22   Katarzyna Thrasher PA-C   Probiotic Product (PROBIOTIC-10 PO) Take 1 tablet by mouth daily    Historical Provider, MD   promethazine (PHENERGAN) 25 mg tablet Take 1 tablet (25 mg total) by mouth every 6 (six) hours as needed for nausea or vomiting  Patient not taking: Reported on 12/23/2021  5/10/21   Radha Alcocer PA-C   Restasis 0 05 % ophthalmic emulsion  12/29/21   Historical Provider, MD   scopolamine (TRANSDERM-SCOP) 1 5 mg/3 days TD 72 hr patch Place 1 patch on the skin every third day 4/27/21   Jonathan Hyde PA-C   sucralfate (CARAFATE) 1 g/10 mL suspension Take 10 mL (1 g total) by mouth 3 (three) times a day 1/28/22   Jane Soria PA-C     I have reviewed home medications with patient personally  Allergies:    Allergies   Allergen Reactions    Prozac [Fluoxetine Hcl]      SI    Bactrim [Sulfamethoxazole-Trimethoprim]      Pt "They think that is what cause the pancreatitis"     Flagyl [Metronidazole] Diarrhea and Abdominal Pain    Lamictal [Lamotrigine] GI Intolerance    Lithium Other (See Comments)    Haldol [Haloperidol] Other (See Comments)     "I don't like it"    Ibuprofen     Lexapro [Escitalopram Oxalate] Rash    Navane [Thiothixene]      SI    Other      "novaine?" antipsychotic       Social History:  Marital Status: Single   Occupation:  Unknown  Patient Pre-hospital Living Situation: Home  Patient Pre-hospital Level of Mobility: unable to be assessed at time of evaluation  Patient Pre-hospital Diet Restrictions:  None  Substance Use History:   Social History     Substance and Sexual Activity   Alcohol Use Never     Social History     Tobacco Use   Smoking Status Former Smoker    Quit date:     Years since quittin 1   Smokeless Tobacco Never Used   Tobacco Comment    marijuana     Social History     Substance and Sexual Activity   Drug Use Yes    Types: Marijuana    Comment: marijuana daily       Family History:  Family History   Problem Relation Age of Onset    No Known Problems Mother     No Known Problems Father     Colon cancer Neg Hx     Drug abuse Neg Hx         mother father    Mental illness Neg Hx         disorder, mother father    Cancer Neg Hx     Breast cancer Neg Hx        Physical Exam:     Vitals:   Blood Pressure: (!) 174/106 (22 1036)  Pulse: 76 (22 1036)  Temperature: 98 1 °F (36 7 °C) (22 1036)  Temp Source: Oral (2234)  Respirations: 19 (22 1036)  Height: 5' 2" (157 5 cm) (22 0734)  Weight - Scale: 63 5 kg (139 lb 15 9 oz) (2234)  SpO2: 97 % (22 1036)    Physical Exam  Vitals reviewed  Constitutional:       General: She is in acute distress  Comments: Writhing in pain   HENT:      Head: Normocephalic and atraumatic  Eyes:      General: No scleral icterus  Conjunctiva/sclera: Conjunctivae normal    Cardiovascular:      Rate and Rhythm: Normal rate and regular rhythm  Heart sounds: No murmur heard  Pulmonary:      Effort: Pulmonary effort is normal  No respiratory distress  Breath sounds: Normal breath sounds     Abdominal:      General: Bowel sounds are normal  There is no distension  Palpations: Abdomen is soft  Tenderness: There is abdominal tenderness (Left upper quadrant tenderness to palpation)  Musculoskeletal:      Cervical back: Neck supple  Right lower leg: No edema  Left lower leg: No edema  Skin:     General: Skin is warm and dry  Neurological:      Mental Status: She is alert and oriented to person, place, and time  Psychiatric:         Mood and Affect: Mood normal          Behavior: Behavior normal           Additional Data:     Lab Results:  Results from last 7 days   Lab Units 02/13/22  0731   WBC Thousand/uL 11 29*   HEMOGLOBIN g/dL 15 0   HEMATOCRIT % 44 7   PLATELETS Thousands/uL 317   NEUTROS PCT % 83*   LYMPHS PCT % 12*   MONOS PCT % 5   EOS PCT % 0     Results from last 7 days   Lab Units 02/13/22  0731   SODIUM mmol/L 143   POTASSIUM mmol/L 4 5   CHLORIDE mmol/L 109*   CO2 mmol/L 19*   BUN mg/dL 35*   CREATININE mg/dL 2 02*   ANION GAP mmol/L 15*   CALCIUM mg/dL 9 5   ALBUMIN g/dL 4 1   TOTAL BILIRUBIN mg/dL 0 21   ALK PHOS U/L 118*   ALT U/L 28   AST U/L 18   GLUCOSE RANDOM mg/dL 138     Results from last 7 days   Lab Units 02/13/22  0756   INR  0 92     Results from last 7 days   Lab Units 02/13/22  1119   POC GLUCOSE mg/dl 125         Results from last 7 days   Lab Units 02/13/22  0817   LACTIC ACID mmol/L 4 7*       Imaging: Reviewed radiology reports from this admission including: abdominal/pelvic CT  CT abdomen pelvis wo contrast   Final Result by Delman President,  (02/13 7954)   No acute CT abnormality to account for the patient's abdominal symptoms  If warranted, consider follow-up GI consultation  Workstation performed: [id number]             EKG and Other Studies Reviewed on Admission:   · EKG: Sinus Bradycardia  HR 47     ** Please Note: This note has been constructed using a voice recognition system   **

## 2022-02-13 NOTE — ED NOTES
Attempted to give patient a mask to transport the patient upstairs to her room - patient immediately became defensive and said she cannot wear the mask and it will make her breathing so much worse  I explained to the patient that she is COVID positive and she cannot expose the rest of the patients in the hospital and that it is policy to wear the mask for her safety and the safety of every other patient and she is still refusing -- patient is showing no signs of trouble breathing at this time        Casa Gomez  02/13/22 1019

## 2022-02-13 NOTE — H&P (VIEW-ONLY)
Patient MRN: [id number]  Date of Service: 2/13/2022  Referring Physician: Errol Hernandez PA-C  Provider Creating Note: Milton Soria PA-C  PCP: Cuong Lock  Reason for Consult:  Abdominal pain, nausea and vomiting  RODNEY Sharp is a 46 y o  female who was admitted with abdominal pain  She has a past medical history of Wegener's granulomatosis, diabetes mellitus, chronic kidney disease, DVT  She has a long history of gastroparesis and has undergone Botox injections and gastric stimulator placement and pyloroplasty  She follows with Dr David Mckenna at [facility]  She was seen in our office 1/28 and upper endoscopy was scheduled but was rescheduled due to patient reporting that she was positive for COVID  She is positive for COVID this admission, full physical exam and interview were not performed  She has had multiple ER visits and hospitalizations for abdominal pain, nausea and vomiting  Her weight is down nearly 20 lb since 02/2021  CT of the abdomen and pelvis without contrast today shows an adrenal myelolipoma and ingested food and air in the stomach      Past Medical History:   Diagnosis Date    ADHD     Anemia of chronic disease     Anxiety     Asthma     Borderline personality disorder (HCC)     Cataplexy     Chronic abdominal pain     CKD (chronic kidney disease) stage 3, GFR 30-59 ml/min (HCC)     Cushing syndrome (HCC)     Diabetes mellitus ([address] )     DVT (deep venous thrombosis) (HCC)     GERD (gastroesophageal reflux disease)     History of acute pancreatitis     felt secondary to Bactrim    History of transfusion     Hypertension     Liver disease     fatty liver    Microscopic polyangiitis (HCC)     Ovarian cyst     PTSD (post-traumatic stress disorder)     Self-inflicted injury     self inflicted skin wounds    Wegener's granulomatosis with renal involvement ([facility] [address] ) 2015     Past Surgical History:   Procedure Laterality Date    ESOPHAGOGASTRODUODENOSCOPY 09/11/2015    mild antral gastritis    GASTRIC STIMULATOR IMPLANT SURGERY  06/25/2020    AR COLONOSCOPY FLX DX W/COLLJ SPEC WHEN PFRMD N/A 12/14/2018    adenoma removed from the transverse, hyperplastic polyp removed from the left colon    AR ESOPHAGOGASTRODUODENOSCOPY TRANSORAL DIAGNOSTIC N/A 12/14/2018    gastritis and scant coffee-ground material   Biopsies negative for H  pylori    RELEASE SCAR CONTRACTURE / GRAFT REPAIRS OF HAND Bilateral     UPPER GASTROINTESTINAL ENDOSCOPY  12/26/2019    Dr Sheila Dodd  Botox to the pylorus     Medications  Home Medications:   Prior to Admission medications    Medication Sig Start Date End Date Taking?  Authorizing Provider   Alcohol Swabs (Alcohol Pads) 70 % PADS Use 4 (four) times a day 12/1/21   Katarzyna Thrasher PA-C   amphetamine-dextroamphetamine (ADDERALL XR) 20 MG 24 hr capsule Take 1 capsule (20 mg total) by mouth 2 (two) times a day Max Daily Amount: 40 mg 1/21/22   Katarzyna Thrasher PA-C   Blood Glucose Monitoring Suppl (FreeStyle Lite) DEIRDRE Use daily 7/23/21   Katarzyna Thrasher PA-C   buPROPion (WELLBUTRIN XL) 300 mg 24 hr tablet Take 1 tablet (300 mg total) by mouth every morning 1/18/22   Katarzyna Thrasher PA-C   clobetasol (TEMOVATE) 0 05 % cream Apply topically 2 (two) times a day 12/29/21   Katarzyna Thrasher PA-C   DULoxetine (Cymbalta) 30 mg delayed release capsule Take 1 capsule (30 mg total) by mouth daily 1/18/22   Katarzyna Thrasher PA-C   Easy Comfort Lancets MISC USE TO TEST THE BLOOD SUGAR THREE TIMES A DAY 3/1/21   Katarzyna Thrasher PA-C   glucose blood (FREESTYLE LITE) test strip Use as instructed 1/27/22   Katarzyna Thrasher PA-C   hydrOXYzine HCL (ATARAX) 25 mg tablet  1/14/22   Historical Provider, MD   insulin aspart (NovoLOG FlexPen) 100 UNIT/ML injection pen Inject 6 Units under the skin 3 (three) times a day with meals 12/27/21   Katarzyna Thrasher PA-C   insulin glargine (Toujeo Max SoloStar) 300 units/mL CONCENTRATED U-300 injection pen (2-unit dial) Inject 22 Units under the skin daily 12/27/21   Katarzyna Thrasher PA-C   Insulin Pen Needle (PEN NEEDLES) 31G X 8 MM MISC Inject 1 Stick under the skin 4 (four) times a day (with meals and at bedtime) 3/28/18   Mike Elkins PA-C   lidocaine (XYLOCAINE) 5 % ointment APPLY TOPICALLY 2GM TWICE A DAY TO AFFECTED AREAS AS NEEDED FOR MILD PAIN 12/10/20   Katarzyna Thrasher PA-C   Lidocaine Viscous HCl (XYLOCAINE) 2 % mucosal solution Swish and spit 15 mL 4 (four) times a day as needed for mouth pain or discomfort 7/16/21   Merline Fate, MD   linaCLOtide (Linzess) 72 MCG CAPS Take 72 mcg by mouth daily 2/18/21   Malka Arvizu PA-C   montelukast (SINGULAIR) 10 mg tablet TAKE ONE TABLET BY MOUTH ONCE DAILY AT BEDTIME 1/17/22   Katarzyna Thrasher PA-C   ondansetron (ZOFRAN) 4 mg tablet Take 1 tablet (4 mg total) by mouth every 8 (eight) hours as needed for nausea or vomiting 1/25/22   Katarzyna Thrasher PA-C   oxyCODONE (ROXICODONE) 10 MG TABS Take 1 tablet (10 mg total) by mouth every 4 (four) hours as needed for moderate pain Max Daily Amount: 60 mg 1/26/22   Katarzyna Thrasher PA-C   pantoprazole (PROTONIX) 40 mg tablet Take 1 tablet (40 mg total) by mouth 2 (two) times a day before meals 1/18/22   Katarzyna Thrasher PA-C   pregabalin (LYRICA) 50 mg capsule Take 1 capsule (50 mg total) by mouth 2 (two) times a day 1/31/22   Katarzyna Thrasher PA-C   Probiotic Product (PROBIOTIC-10 PO) Take 1 tablet by mouth daily    Historical Provider, MD   promethazine (PHENERGAN) 25 mg tablet Take 1 tablet (25 mg total) by mouth every 6 (six) hours as needed for nausea or vomiting  Patient not taking: Reported on 12/23/2021  5/10/21   Fred Adair PA-C   Restasis 0 05 % ophthalmic emulsion  12/29/21   Historical Provider, MD   scopolamine (TRANSDERM-SCOP) 1 5 mg/3 days TD 72 hr patch Place 1 patch on the skin every third day 4/27/21   Reese Ramirez PA-C   sucralfate (CARAFATE) 1 g/10 mL suspension Take 10 mL (1 g total) by mouth 3 (three) times a day 1/28/22   Jane Soria PA-C       Inhouse Medications    Current Facility-Administered Medications:     HYDROmorphone (DILAUDID) injection 0 5 mg, 0 5 mg, Intravenous, Q4H PRN    ondansetron (ZOFRAN) injection 4 mg, 4 mg, Intravenous, Q4H PRN    Allergies  Allergies   Allergen Reactions    Prozac [Fluoxetine Hcl]      SI    Bactrim [Sulfamethoxazole-Trimethoprim]      Pt "They think that is what cause the pancreatitis"     Flagyl [Metronidazole] Diarrhea and Abdominal Pain    Lamictal [Lamotrigine] GI Intolerance    Lithium Other (See Comments)    Haldol [Haloperidol] Other (See Comments)     "I don't like it"    Ibuprofen     Lexapro [Escitalopram Oxalate] Rash    Navane [Thiothixene]      SI    Other      "novaine?" antipsychotic     Social History   reports that she quit smoking about 11 years ago  She has never used smokeless tobacco  She reports current drug use  Drug: Marijuana  She reports that she does not drink alcohol    Family History  Family History   Problem Relation Age of Onset    No Known Problems Mother     No Known Problems Father     Colon cancer Neg Hx     Drug abuse Neg Hx         mother father    Mental illness Neg Hx         disorder, mother father    Cancer Neg Hx     Breast cancer Neg Hx          Objective   Vitals  BP (!) 174/106   Pulse 76   Temp 98 1 °F (36 7 °C)   Resp 19   Ht 5' 2" (1 575 m)   Wt 63 5 kg (139 lb 15 9 oz)   LMP  (LMP Unknown)   SpO2 97%   BMI 25 60 kg/m²       Laboratory Studies  Lab Results   Component Value Date    WBC 11 29 (H) 02/13/2022    HGB 15 0 02/13/2022    HCT 44 7 02/13/2022     02/13/2022    MCV 91 02/13/2022     Lab Results   Component Value Date    CREATININE 2 02 (H) 02/13/2022    BUN 35 (H) 02/13/2022    SODIUM 143 02/13/2022    K 4 5 02/13/2022     (H) 02/13/2022    CO2 19 (L) 02/13/2022    GLUC 138 02/13/2022    CALCIUM 9 5 02/13/2022    ALKPHOS 118 (H) 02/13/2022    ALB 4 1 02/13/2022 TBILI 0 21 02/13/2022    AST 18 02/13/2022    ALT 28 02/13/2022     Lab Results   Component Value Date    PROTIME 12 2 02/13/2022    INR 0 92 02/13/2022       Imaging and Other Studies  CT A/P no contrast    FINDINGS:  ABDOMEN  LOWER CHEST:  No clinically significant abnormality identified in the visualized lower chest            LIVER/BILIARY TREE:  Unremarkable         GALLBLADDER:  No calcified gallstones  No pericholecystic inflammatory change            SPLEEN:  Unremarkable         PANCREAS:  Unremarkable         ADRENAL GLANDS:  Stable 1 6 cm low-density lesion arising from the posterior limb of the left adrenal gland with Hounsfield units measuring approximately -10  Findings most compatible with a benign adrenal myelolipoma  No further imaging is necessary      Right adrenal gland normal         KIDNEYS/URETERS:  Unremarkable  No hydronephrosis           STOMACH AND BOWEL:    The stomach is incompletely distended with ingested food products and air  Gastric stimulator is present      No evidence of small bowel obstruction      The colon is relatively decompressed and inadequately evaluated  Scattered diverticula of the descending colon and sigmoid colon  Nothing to suggest acute diverticulitis         APPENDIX:  No findings to suggest appendicitis           ABDOMINOPELVIC CAVITY:  No ascites  No pneumoperitoneum  No lymphadenopathy         VESSELS:  Unremarkable for patient's age               PELVIS  REPRODUCTIVE ORGANS:  Stable low-density lesion in the left adnexa, most compatible with a simple cyst         URINARY BLADDER:  Unremarkable            ABDOMINAL WALL/INGUINAL REGIONS:  Spinal degenerative        OSSEOUS STRUCTURES:  No acute fracture or destructive osseous lesion               IMPRESSION:  No acute CT abnormality to account for the patient's abdominal symptoms      If warranted, consider follow-up GI consultation      Assessment and Plan:  1   Chronic abdominal pain, nausea, vomiting with history of gastroparesis  Last EGD 05/2021 unremarkable  Stomach distended with food and air on CT  Would keep patient NPO  Suggest surgical consult to rule out ischemia with history of Wegener's  Active Problems:    * No active hospital problems   *      Jane Soria PA-C

## 2022-02-13 NOTE — ED PROVIDER NOTES
History  Chief Complaint   Patient presents with    Vomiting     Patient is a 49-year-old female with past medical history of anxiety, borderline personality disorder, asthma, CKD, Cushing's, diabetes mellitus, GERD and gastroparesis, pancreatitis, hypertension, Wegener's granulomatosis who is brought in by EMS for abdominal pain, nausea and vomiting  History is difficult due to patient's persistent retching and discomfort  She states that she started this morning with epigastric abdominal pain, nausea, vomiting and retching  She also had some loose stools/diarrhea today  She states she has had this in the past   Pain is in her typical location  She has been unable to take any medications this morning  She denies chest pain, shortness of breath, urinary complaints          Prior to Admission Medications   Prescriptions Last Dose Informant Patient Reported? Taking?    Alcohol Swabs (Alcohol Pads) 70 % PADS   No No   Sig: Use 4 (four) times a day   Blood Glucose Monitoring Suppl (FreeStyle Lite) DEIRDRE   No No   Sig: Use daily   DULoxetine (Cymbalta) 30 mg delayed release capsule   No No   Sig: Take 1 capsule (30 mg total) by mouth daily   Easy Comfort Lancets MISC   No No   Sig: USE TO TEST THE BLOOD SUGAR THREE TIMES A DAY   Insulin Pen Needle (PEN NEEDLES) 31G X 8 MM MISC  Self No No   Sig: Inject 1 Stick under the skin 4 (four) times a day (with meals and at bedtime)   Lidocaine Viscous HCl (XYLOCAINE) 2 % mucosal solution   No No   Sig: Swish and spit 15 mL 4 (four) times a day as needed for mouth pain or discomfort   Probiotic Product (PROBIOTIC-10 PO)  Self Yes No   Sig: Take 1 tablet by mouth daily   Restasis 0 05 % ophthalmic emulsion   Yes No   amphetamine-dextroamphetamine (ADDERALL XR) 20 MG 24 hr capsule   No No   Sig: Take 1 capsule (20 mg total) by mouth 2 (two) times a day Max Daily Amount: 40 mg   buPROPion (WELLBUTRIN XL) 300 mg 24 hr tablet   No No   Sig: Take 1 tablet (300 mg total) by mouth every morning   clobetasol (TEMOVATE) 0 05 % cream   No No   Sig: Apply topically 2 (two) times a day   glucose blood (FREESTYLE LITE) test strip   No No   Sig: Use as instructed   hydrOXYzine HCL (ATARAX) 25 mg tablet   Yes No   insulin aspart (NovoLOG FlexPen) 100 UNIT/ML injection pen   No No   Sig: Inject 6 Units under the skin 3 (three) times a day with meals   insulin glargine (Toujeo Max SoloStar) 300 units/mL CONCENTRATED U-300 injection pen (2-unit dial)   No No   Sig: Inject 22 Units under the skin daily   lidocaine (XYLOCAINE) 5 % ointment   No No   Sig: APPLY TOPICALLY 2GM TWICE A DAY TO AFFECTED AREAS AS NEEDED FOR MILD PAIN   linaCLOtide (Linzess) 72 MCG CAPS   No No   Sig: Take 72 mcg by mouth daily   montelukast (SINGULAIR) 10 mg tablet   No No   Sig: TAKE ONE TABLET BY MOUTH ONCE DAILY AT BEDTIME   ondansetron (ZOFRAN) 4 mg tablet   No No   Sig: Take 1 tablet (4 mg total) by mouth every 8 (eight) hours as needed for nausea or vomiting   oxyCODONE (ROXICODONE) 10 MG TABS   No No   Sig: Take 1 tablet (10 mg total) by mouth every 4 (four) hours as needed for moderate pain Max Daily Amount: 60 mg   pantoprazole (PROTONIX) 40 mg tablet   No No   Sig: Take 1 tablet (40 mg total) by mouth 2 (two) times a day before meals   pregabalin (LYRICA) 50 mg capsule   No No   Sig: Take 1 capsule (50 mg total) by mouth 2 (two) times a day   promethazine (PHENERGAN) 25 mg tablet   No No   Sig: Take 1 tablet (25 mg total) by mouth every 6 (six) hours as needed for nausea or vomiting   Patient not taking: Reported on 12/23/2021    scopolamine (TRANSDERM-SCOP) 1 5 mg/3 days TD 72 hr patch   No No   Sig: Place 1 patch on the skin every third day   sucralfate (CARAFATE) 1 g/10 mL suspension   No No   Sig: Take 10 mL (1 g total) by mouth 3 (three) times a day      Facility-Administered Medications: None       Past Medical History:   Diagnosis Date    ADHD     Anemia of chronic disease     Anxiety     Asthma     Borderline personality disorder ([place] [address] )     Cataplexy     Chronic abdominal pain     CKD (chronic kidney disease) stage 3, GFR 30-59 ml/min (HCC)     Cushing syndrome (HCC)     Diabetes mellitus ([facility] )     DVT (deep venous thrombosis) (HCC)     GERD (gastroesophageal reflux disease)     History of acute pancreatitis     felt secondary to Bactrim    History of transfusion     Hypertension     Liver disease     fatty liver    Microscopic polyangiitis (HCC)     Ovarian cyst     PTSD (post-traumatic stress disorder)     Self-inflicted injury     self inflicted skin wounds    Wegener's granulomatosis with renal involvement ([place] [address] )        Past Surgical History:   Procedure Laterality Date    ESOPHAGOGASTRODUODENOSCOPY  2015    mild antral gastritis    GASTRIC STIMULATOR IMPLANT SURGERY  2020    MO COLONOSCOPY FLX DX W/COLLJ SPEC WHEN PFRMD N/A 2018    adenoma removed from the transverse, hyperplastic polyp removed from the left colon    MO ESOPHAGOGASTRODUODENOSCOPY TRANSORAL DIAGNOSTIC N/A 2018    gastritis and scant coffee-ground material   Biopsies negative for H  pylori    RELEASE SCAR CONTRACTURE / GRAFT REPAIRS OF HAND Bilateral     UPPER GASTROINTESTINAL ENDOSCOPY  2019    Dr Kvng Snider  Botox to the pylorus       Family History   Problem Relation Age of Onset    No Known Problems Mother     No Known Problems Father     Colon cancer Neg Hx     Drug abuse Neg Hx         mother father    Mental illness Neg Hx         disorder, mother father    Cancer Neg Hx     Breast cancer Neg Hx      I have reviewed and agree with the history as documented      E-Cigarette/Vaping    E-Cigarette Use Never User      E-Cigarette/Vaping Substances    Nicotine No     THC No     CBD No      Social History     Tobacco Use    Smoking status: Former Smoker     Quit date:      Years since quittin 1    Smokeless tobacco: Never Used    Tobacco comment: marijuana   Vaping Use  Vaping Use: Never used   Substance Use Topics    Alcohol use: Never    Drug use: Yes     Types: Marijuana     Comment: marijuana daily       Review of Systems   Constitutional: Negative for chills and fever  Respiratory: Negative for shortness of breath  Cardiovascular: Negative for chest pain  Gastrointestinal: Positive for abdominal pain, diarrhea, nausea and vomiting  All other systems reviewed and are negative  Physical Exam  Physical Exam  Vitals and nursing note reviewed  Constitutional:       General: She is in acute distress  Appearance: Normal appearance  She is not toxic-appearing  Comments: Writhing around in bed, retching and spitting up mucus/phlegm/clear liquids  HENT:      Head: Normocephalic and atraumatic  Right Ear: External ear normal       Left Ear: External ear normal    Eyes:      Conjunctiva/sclera: Conjunctivae normal    Cardiovascular:      Rate and Rhythm: Normal rate and regular rhythm  Heart sounds: Normal heart sounds  No murmur heard  Pulmonary:      Effort: Pulmonary effort is normal  No respiratory distress  Breath sounds: Normal breath sounds  No stridor  No wheezing or rhonchi  Abdominal:      General: Abdomen is flat  Bowel sounds are normal  There is no distension  Palpations: Abdomen is soft  Tenderness: There is generalized abdominal tenderness  Comments: Epigastric and LUQ abdominal pain    Musculoskeletal:         General: Normal range of motion  Skin:     General: Skin is warm  Neurological:      Mental Status: She is alert  Mental status is at baseline     Psychiatric:         Mood and Affect: Mood normal          Vital Signs  ED Triage Vitals   Temperature Pulse Respirations Blood Pressure SpO2   02/13/22 0734 02/13/22 0734 02/13/22 0734 02/13/22 0734 02/13/22 0734   97 9 °F (36 6 °C) 70 18 (!) 208/111 99 %      Temp Source Heart Rate Source Patient Position - Orthostatic VS BP Location FiO2 (%) 02/13/22 0734 02/13/22 0734 02/13/22 0734 02/13/22 0734 --   Oral Monitor Lying Right arm       Pain Score       02/13/22 0727       10 - Worst Possible Pain           Vitals:    02/13/22 0734 02/13/22 0755 02/13/22 1036   BP: (!) 208/111 (!) 178/88 (!) 174/106   Pulse: 70  76   Patient Position - Orthostatic VS: Lying           Visual Acuity      ED Medications  Medications   HYDROmorphone (DILAUDID) injection 0 5 mg (0 5 mg Intravenous Given 2/13/22 1500)   ondansetron (ZOFRAN) injection 4 mg (4 mg Intravenous Given 2/13/22 1052)   buPROPion (WELLBUTRIN XL) 24 hr tablet 300 mg (0 mg Oral Hold 2/13/22 1219)   insulin glargine (LANTUS) subcutaneous injection 22 Units 0 22 mL (22 Units Subcutaneous Given 2/13/22 1228)   pantoprazole (PROTONIX) injection 40 mg (40 mg Intravenous Given 2/13/22 1228)   pregabalin (LYRICA) capsule 50 mg (0 mg Oral Hold 2/13/22 1219)   saccharomyces boulardii (FLORASTOR) capsule 250 mg (250 mg Oral Refused 2/13/22 1219)   scopolamine (TRANSDERM-SCOP) 1 mg/3 days TD 72 hr patch 1 patch (1 patch Transdermal Medication Applied 2/13/22 1228)   sucralfate (CARAFATE) tablet 1 g (1 g Oral Refused 2/13/22 1220)   Lidocaine Viscous HCl (XYLOCAINE) 2 % mucosal solution 15 mL (has no administration in time range)   oxyCODONE (ROXICODONE) immediate release tablet 10 mg (has no administration in time range)   montelukast (SINGULAIR) tablet 10 mg (has no administration in time range)   sodium chloride 0 9 % infusion (125 mL/hr Intravenous New Bag 2/13/22 1228)   calcium carbonate (TUMS) chewable tablet 1,000 mg (has no administration in time range)   acetaminophen (TYLENOL) tablet 650 mg (has no administration in time range)   heparin (porcine) subcutaneous injection 5,000 Units (5,000 Units Subcutaneous Given 2/13/22 1501)   insulin lispro (HumaLOG) 100 units/mL subcutaneous injection 1-5 Units (1 Units Subcutaneous Not Given 2/13/22 1205)   amLODIPine (NORVASC) tablet 5 mg (has no administration in time range)   hydrALAZINE (APRESOLINE) injection 5 mg (has no administration in time range)   sodium chloride 0 9 % bolus 1,000 mL (1,000 mL Intravenous New Bag 2/13/22 0728)   ondansetron (ZOFRAN) injection 4 mg (4 mg Intravenous Given 2/13/22 0727)   HYDROmorphone (DILAUDID) injection 0 5 mg (0 5 mg Intravenous Given 2/13/22 0727)   metoclopramide (REGLAN) injection 10 mg (10 mg Intravenous Given 2/13/22 0816)       Diagnostic Studies  Results Reviewed     Procedure Component Value Units Date/Time    Lactic acid 2 Hours [[id number]]  (Normal) Collected: 02/13/22 1242    Lab Status: Final result Specimen: Blood from Arm, Right Updated: 02/13/22 1320     LACTIC ACID 0 9 mmol/L     Narrative:      Result may be elevated if tourniquet was used during collection      HS Troponin I 2hr [[id number]]  (Normal) Collected: 02/13/22 1241    Lab Status: Final result Specimen: Blood from Arm, Right Updated: 02/13/22 1312     hs TnI 2hr 4 ng/L      Delta 2hr hsTnI -1 ng/L     Urine Microscopic [[id number]]  (Abnormal) Collected: 02/13/22 0914    Lab Status: Final result Specimen: Urine, Other Updated: 02/13/22 0943     RBC, UA 0-1 /hpf      WBC, UA 0-1 /hpf      Epithelial Cells Occasional /hpf      Bacteria, UA None Seen /hpf     Urine Macroscopic, POC [[id number]]  (Abnormal) Collected: 02/13/22 0914    Lab Status: Final result Specimen: Urine Updated: 02/13/22 0915     Color, UA Yellow     Clarity, UA Clear     pH, UA 5 5     Leukocytes, UA Negative     Nitrite, UA Negative     Protein, UA >=300 mg/dl      Glucose, UA Negative mg/dl      Ketones, UA Negative mg/dl      Urobilinogen, UA 0 2 E U /dl      Bilirubin, UA Negative     Blood, UA Small     Specific Roe, UA 1 025    Narrative:      CLINITEK RESULT    Lactic acid, plasma [[id number]]  (Abnormal) Collected: 02/13/22 0817    Lab Status: Final result Specimen: Blood from Arm, Right Updated: 02/13/22 0853     LACTIC ACID 4 7 mmol/L     Narrative:      Result may be elevated if tourniquet was used during collection  HS Troponin I 4hr [[id number]]     Lab Status: No result Specimen: Blood     HS Troponin 0hr (reflex protocol) [[id number]]  (Normal) Collected: 02/13/22 0756    Lab Status: Final result Specimen: Blood from Arm, Right Updated: 02/13/22 0828     hs TnI 0hr 5 ng/L     COVID/FLU/RSV - 2 hour TAT [[id number]]  (Abnormal) Collected: 02/13/22 0732    Lab Status: Final result Specimen: Nares from Nose Updated: 02/13/22 0827     SARS-CoV-2 Positive     INFLUENZA A PCR Negative     INFLUENZA B PCR Negative     RSV PCR Negative    Narrative:      FOR PEDIATRIC PATIENTS - copy/paste COVID Guidelines URL to browser: [email or web address]  [employer] [email or web address]    SARS-CoV-2 assay is a Nucleic Acid Amplification assay intended for the  qualitative detection of nucleic acid from SARS-CoV-2 in nasopharyngeal  swabs  Results are for the presumptive identification of SARS-CoV-2 RNA  Positive results are indicative of infection with SARS-CoV-2, the virus  causing COVID-19, but do not rule out bacterial infection or co-infection  with other viruses  Laboratories within the [place] and its  territories are required to report all positive results to the appropriate  public health authorities  Negative results do not preclude SARS-CoV-2  infection and should not be used as the sole basis for treatment or other  patient management decisions  Negative results must be combined with  clinical observations, patient history, and epidemiological information  This test has not been FDA cleared or approved  This test has been authorized by FDA under an Emergency Use Authorization  (EUA)   This test is only authorized for the duration of time the  declaration that circumstances exist justifying the authorization of the  emergency use of an in vitro diagnostic tests for detection of SARS-CoV-2  virus and/or diagnosis of COVID-19 infection under section 564(b)(1) of  the Act, 21 U  S C  599ZLD-4(Y)(1), unless the authorization is terminated  or revoked sooner  The test has been validated but independent review by FDA  and CLIA is pending  Test performed using [employer] Gene[employer]pert: This RT-PCR assay targets N2,  a region unique to SARS-CoV-2  A conserved region in the E-gene was chosen  for pan-Sarbecovirus detection which includes SARS-CoV-2      Protime-INR [[id number]]  (Normal) Collected: 02/13/22 0756    Lab Status: Final result Specimen: Blood from Arm, Right Updated: 02/13/22 0816     Protime 12 2 seconds      INR 0 92    APTT [[id number]]  (Normal) Collected: 02/13/22 0756    Lab Status: Final result Specimen: Blood from Arm, Right Updated: 02/13/22 0816     PTT 37 seconds     Comprehensive metabolic panel [[id number]]  (Abnormal) Collected: 02/13/22 0731    Lab Status: Final result Specimen: Blood from Arm, Right Updated: 02/13/22 0804     Sodium 143 mmol/L      Potassium 4 5 mmol/L      Chloride 109 mmol/L      CO2 19 mmol/L      ANION GAP 15 mmol/L      BUN 35 mg/dL      Creatinine 2 02 mg/dL      Glucose 138 mg/dL      Calcium 9 5 mg/dL      AST 18 U/L      ALT 28 U/L      Alkaline Phosphatase 118 U/L      Total Protein 8 4 g/dL      Albumin 4 1 g/dL      Total Bilirubin 0 21 mg/dL      eGFR 27 ml/min/1 73sq m     Narrative:      [place] guidelines for Chronic Kidney Disease (CKD):     Stage 1 with normal or high GFR (GFR > 90 mL/min/1 73 square meters)    Stage 2 Mild CKD (GFR = 60-89 mL/min/1 73 square meters)    Stage 3A Moderate CKD (GFR = 45-59 mL/min/1 73 square meters)    Stage 3B Moderate CKD (GFR = 30-44 mL/min/1 73 square meters)    Stage 4 Severe CKD (GFR = 15-29 mL/min/1 73 square meters)    Stage 5 End Stage CKD (GFR <15 mL/min/1 73 square meters)  Note: GFR calculation is accurate only with a steady state creatinine    Lipase [[id number]]  (Abnormal) Collected: 02/13/22 0731    Lab Status: Final result Specimen: Blood from Arm, Right Updated: 02/13/22 0804     Lipase 468 u/L     CBC and differential [[id number]]  (Abnormal) Collected: 02/13/22 0731    Lab Status: Final result Specimen: Blood from Arm, Right Updated: 02/13/22 0741     WBC 11 29 Thousand/uL      RBC 4 89 Million/uL      Hemoglobin 15 0 g/dL      Hematocrit 44 7 %      MCV 91 fL      MCH 30 7 pg      MCHC 33 6 g/dL      RDW 12 6 %      MPV 10 7 fL      Platelets 912 Thousands/uL      nRBC 0 /100 WBCs      Neutrophils Relative 83 %      Immat GRANS % 0 %      Lymphocytes Relative 12 %      Monocytes Relative 5 %      Eosinophils Relative 0 %      Basophils Relative 0 %      Neutrophils Absolute 9 25 Thousands/µL      Immature Grans Absolute 0 04 Thousand/uL      Lymphocytes Absolute 1 38 Thousands/µL      Monocytes Absolute 0 57 Thousand/µL      Eosinophils Absolute 0 02 Thousand/µL      Basophils Absolute 0 03 Thousands/µL                  CT abdomen pelvis wo contrast   Final Result by Kaykay Martinez DO (02/13 8633)   No acute CT abnormality to account for the patient's abdominal symptoms  If warranted, consider follow-up GI consultation  Workstation performed: [id number]                    Procedures  ECG 12 Lead Documentation Only    Date/Time: 2/13/2022 8:28 AM  Performed by: Jordan Rodriguez PA-C  Authorized by: Jordan Rodriguez PA-C     Indications / Diagnosis:  Epigastric abdominal pain  ECG reviewed by me, the ED Provider: yes    Patient location:  ED  Previous ECG:     Previous ECG:  Compared to current             ED Course  ED Course as of 02/13/22 1603   Sun Feb 13, 2022   0900 Lactic elevated  No other SIRS criteria  Do not have suspected bacterial source of infection at this time  Positive covid  +NVD  Getting IVF currently  Initial Sepsis Screening     Row Name 02/13/22 0857                Is the patient's history suggestive of a new or worsening infection?  No  -EM        Suspected source of infection -- Are two or more of the following signs & symptoms of infection both present and new to the patient? No  -EM        Indicate SIRS criteria --        If the answer is yes to both questions, suspicion of sepsis is present --        If severe sepsis is present AND tissue hypoperfusion perists in the hour after fluid resuscitation or lactate > 4, the patient meets criteria for SEPTIC SHOCK --        Are any of the following organ dysfunction criteria present within 6 hours of suspected infection and SIRS criteria that are NOT considered to be chronic conditions? --        Organ dysfunction --        Date of presentation of severe sepsis --        Time of presentation of severe sepsis --        Tissue hypoperfusion persists in the hour after crystalloid fluid administration, evidenced, by either: --        Was hypotension present within one hour of the conclusion of crystalloid fluid administration? --        Date of presentation of septic shock --        Time of presentation of septic shock --              User Key  (r) = Recorded By, (t) = Taken By, (c) = Cosigned By    [address] Name Provider Type    EM Katherine Meyer PA-C Physician Assistant                              MDM  Number of Diagnoses or Management Options  CKD (chronic kidney disease)  COVID  Elevated lactic acid level  Epigastric abdominal pain  HTN (hypertension)  Nausea vomiting and diarrhea  Diagnosis management comments: Plan- will check basic labs, UA, and CT scan abd/pelvis  Will give IVF, dilaudid and zofran  Patient blood pressure elevated here- patient does have a history of hypertension and admits she has not taken her medications today due to her nausea vomiting and abdominal pain  Blood pressure elevation may also be in part due to pain and her writhing around  Will recheck  Given her epigastric abdominal pain nausea and vomiting will add a an EKG and troponin  Recheck BP improving to 178/88  Mild leukocytosis at 11 29    Cr stable at 2 02  Mild elevated BUN at 35  Anion gap 15 however glucose only 138  Lipase 468 however this appears to be baseline for patient except for a recent admission in December for pancreatitis with a lipase in the 1700s  Trop 5, EKG without ST seg elevation/depression  NSR  Covid positive  UA does not indicate infection  Patient still complaining of nausea, vomiting, and abdominal pain  Will add reglan and check a lactic acid  CT scan abd/pelvis without acute abnormality  Lactic elevated at 4 7, does not meet other SIRS criteria and no bacterial source at this time  Will hold off on abx at this time  Receiving IV fluids however given positive COVID will hold off on further  Patient notes that her nausea and vomiting has improved  She still has some mild epigastric abdominal pain  Discussed admission with patient which she is agreeable with  Discussed with demetrius, Dr Trinidad East, will admit for observation to [facility]  GI consult placed  Discussed with GI on-call Jane Soria and informed of consult  She is familiar with the patient  Patient is stable and non toxic at time of admission          Amount and/or Complexity of Data Reviewed  Clinical lab tests: ordered and reviewed  Tests in the radiology section of CPT®: ordered and reviewed  Discuss the patient with other providers: yes        Disposition  Final diagnoses:   COVID   Epigastric abdominal pain   Nausea vomiting and diarrhea   Elevated lactic acid level   HTN (hypertension)   CKD (chronic kidney disease)     Time reflects when diagnosis was documented in both MDM as applicable and the Disposition within this note     Time User Action Codes Description Comment    2/13/2022  9:42 AM [place] Add [U07 1] COVID     2/13/2022  9:42 AM [place] Add [R10 13] Epigastric abdominal pain     2/13/2022  9:42 AM [place] Add [R11 2,  R19 7] Nausea vomiting and diarrhea     2/13/2022  9:43 AM [place] Add [R79 89] Elevated lactic acid level 2/13/2022  9:51 AM [place] Add [I10] HTN (hypertension)     2/13/2022  9:51 AM [facility] Add [N18 9] CKD (chronic kidney disease)     2/13/2022 11:04 AM Day, Patsy Aguilar Add [M31 30] Granulomatosis with polyangiitis, unspecified whether renal involvement [facility])       ED Disposition     ED Disposition Condition Date/Time Comment    Admit Stable Sun Feb 13, 2022  9:50 AM Case was discussed with Dr Celine Wright and the patient's admission status was agreed to be Admission Status: observation status to the service of Dr Celine Wright          Follow-up Information    None         Current Discharge Medication List      CONTINUE these medications which have NOT CHANGED    Details   Alcohol Swabs (Alcohol Pads) 70 % PADS Use 4 (four) times a day  Qty: 400 each, Refills: 2    Associated Diagnoses: Type 2 diabetes mellitus with stage 4 chronic kidney disease, unspecified whether long term insulin use (HCC)      amphetamine-dextroamphetamine (ADDERALL XR) 20 MG 24 hr capsule Take 1 capsule (20 mg total) by mouth 2 (two) times a day Max Daily Amount: 40 mg  Qty: 60 capsule, Refills: 0    Comments: Continuation of therapy  Do not refill until 12/24/21    Associated Diagnoses: Bipolar 1 disorder (HCC)      Blood Glucose Monitoring Suppl (FreeStyle Lite) DEIRDRE Use daily  Qty: 1 each, Refills: 0    Associated Diagnoses: Type 2 diabetes mellitus with stage 4 chronic kidney disease, unspecified whether long term insulin use (HCC)      buPROPion (WELLBUTRIN XL) 300 mg 24 hr tablet Take 1 tablet (300 mg total) by mouth every morning  Qty: 30 tablet, Refills: 2    Associated Diagnoses: Depression, unspecified depression type      clobetasol (TEMOVATE) 0 05 % cream Apply topically 2 (two) times a day  Qty: 60 g, Refills: 2    Associated Diagnoses: Rash      DULoxetine (Cymbalta) 30 mg delayed release capsule Take 1 capsule (30 mg total) by mouth daily  Qty: 30 capsule, Refills: 2    Associated Diagnoses: Neuropathy      Easy Comfort Lancets MISC USE TO TEST THE BLOOD SUGAR THREE TIMES A DAY  Qty: 300 each, Refills: 10    Associated Diagnoses: Type 2 diabetes mellitus with stage 4 chronic kidney disease, unspecified whether long term insulin use ([facility])      glucose blood (FREESTYLE LITE) test strip Use as instructed  Qty: 100 strip, Refills: 0    Associated Diagnoses: Type 2 diabetes mellitus without complication, with long-term current use of insulin ([facility])      hydrOXYzine HCL (ATARAX) 25 mg tablet       insulin aspart (NovoLOG FlexPen) 100 UNIT/ML injection pen Inject 6 Units under the skin 3 (three) times a day with meals  Qty: 15 mL, Refills: 2    Associated Diagnoses: Type 2 diabetes mellitus without complication, with long-term current use of insulin ([facility])      insulin glargine (Toujeo Max SoloStar) 300 units/mL CONCENTRATED U-300 injection pen (2-unit dial) Inject 22 Units under the skin daily  Qty: 9 mL, Refills: 2    Associated Diagnoses: Type 2 diabetes mellitus without complication, with long-term current use of insulin ([facility])      Insulin Pen Needle (PEN NEEDLES) 31G X 8 MM MISC Inject 1 Stick under the skin 4 (four) times a day (with meals and at bedtime)  Qty: 100 each, Refills: 6    Associated Diagnoses: IDDM (insulin dependent diabetes mellitus)      lidocaine (XYLOCAINE) 5 % ointment APPLY TOPICALLY 2GM TWICE A DAY TO AFFECTED AREAS AS NEEDED FOR MILD PAIN  Qty: 250 g, Refills: 8    Associated Diagnoses: Chronic pain syndrome      Lidocaine Viscous HCl (XYLOCAINE) 2 % mucosal solution Swish and spit 15 mL 4 (four) times a day as needed for mouth pain or discomfort  Qty: 15 mL, Refills: 1    Associated Diagnoses: Mouth sores      linaCLOtide (Linzess) 72 MCG CAPS Take 72 mcg by mouth daily  Qty: 90 capsule, Refills: 3    Associated Diagnoses: Constipation, unspecified constipation type      montelukast (SINGULAIR) 10 mg tablet TAKE ONE TABLET BY MOUTH ONCE DAILY AT BEDTIME  Qty: 90 tablet, Refills: 0    Associated Diagnoses: Seasonal allergic rhinitis due to pollen      ondansetron (ZOFRAN) 4 mg tablet Take 1 tablet (4 mg total) by mouth every 8 (eight) hours as needed for nausea or vomiting  Qty: 30 tablet, Refills: 0    Associated Diagnoses: Gastroparesis      oxyCODONE (ROXICODONE) 10 MG TABS Take 1 tablet (10 mg total) by mouth every 4 (four) hours as needed for moderate pain Max Daily Amount: 60 mg  Qty: 180 tablet, Refills: 0    Comments: Continuation of therapy  Associated Diagnoses: Granulomatosis with polyangiitis with renal involvement ([facility] [address] ); Chronic pain syndrome      pantoprazole (PROTONIX) 40 mg tablet Take 1 tablet (40 mg total) by mouth 2 (two) times a day before meals  Qty: 180 tablet, Refills: 1    Associated Diagnoses: Gastroesophageal reflux disease without esophagitis      pregabalin (LYRICA) 50 mg capsule Take 1 capsule (50 mg total) by mouth 2 (two) times a day  Qty: 60 capsule, Refills: 1    Associated Diagnoses: Neuropathy      Probiotic Product (PROBIOTIC-10 PO) Take 1 tablet by mouth daily      promethazine (PHENERGAN) 25 mg tablet Take 1 tablet (25 mg total) by mouth every 6 (six) hours as needed for nausea or vomiting  Qty: 60 tablet, Refills: 3    Associated Diagnoses: Nausea and vomiting      Restasis 0 05 % ophthalmic emulsion       scopolamine (TRANSDERM-SCOP) 1 5 mg/3 days TD 72 hr patch Place 1 patch on the skin every third day  Qty: 10 patch, Refills: 0    Associated Diagnoses: Pyelonephritis      sucralfate (CARAFATE) 1 g/10 mL suspension Take 10 mL (1 g total) by mouth 3 (three) times a day  Qty: 500 mL, Refills: 0    Associated Diagnoses: Gastroesophageal reflux disease, unspecified whether esophagitis present             No discharge procedures on file      PDMP Review       Value Time User    PDMP Reviewed  Yes 1/31/2022 10:07 PM Erin Castrejon PA-C          ED Provider  Electronically Signed by           Maurilio Myers PA-C  02/13/22 4785

## 2022-02-13 NOTE — PLAN OF CARE
Problem: NEUROSENSORY - ADULT  Goal: Achieves maximal functionality and self care  Description: INTERVENTIONS  - Monitor swallowing and airway patency with patient fatigue and changes in neurological status  - Encourage and assist patient to increase activity and self care     - Encourage visually impaired, hearing impaired and aphasic patients to use assistive/communication devices  Outcome: Progressing     Problem: GASTROINTESTINAL - ADULT  Goal: Minimal or absence of nausea and/or vomiting  Description: INTERVENTIONS:  - Administer IV fluids if ordered to ensure adequate hydration  - Maintain NPO status until nausea and vomiting are resolved  - Nasogastric tube if ordered  - Administer ordered antiemetic medications as needed  - Provide nonpharmacologic comfort measures as appropriate  - Advance diet as tolerated, if ordered  - Consider nutrition services referral to assist patient with adequate nutrition and appropriate food choices  Outcome: Progressing  Goal: Maintains or returns to baseline bowel function  Description: INTERVENTIONS:  - Assess bowel function  - Encourage oral fluids to ensure adequate hydration  - Administer IV fluids if ordered to ensure adequate hydration  - Administer ordered medications as needed  - Encourage mobilization and activity  - Consider nutritional services referral to assist patient with adequate nutrition and appropriate food choices  Outcome: Progressing  Goal: Maintains adequate nutritional intake  Description: INTERVENTIONS:  - Monitor percentage of each meal consumed  - Identify factors contributing to decreased intake, treat as appropriate  - Assist with meals as needed  - Monitor I&O, weight, and lab values if indicated  - Obtain nutrition services referral as needed  Outcome: Progressing     Problem: METABOLIC, FLUID AND ELECTROLYTES - ADULT  Goal: Electrolytes maintained within normal limits  Description: INTERVENTIONS:  - Monitor labs and assess patient for signs and symptoms of electrolyte imbalances  - Administer electrolyte replacement as ordered  - Monitor response to electrolyte replacements, including repeat lab results as appropriate  - Instruct patient on fluid and nutrition as appropriate  Outcome: Progressing  Goal: Fluid balance maintained  Description: INTERVENTIONS:  - Monitor labs   - Monitor I/O and WT  - Instruct patient on fluid and nutrition as appropriate  - Assess for signs & symptoms of volume excess or deficit  Outcome: Progressing  Goal: Glucose maintained within target range  Description: INTERVENTIONS:  - Monitor Blood Glucose as ordered  - Assess for signs and symptoms of hyperglycemia and hypoglycemia  - Administer ordered medications to maintain glucose within target range  - Assess nutritional intake and initiate nutrition service referral as needed  Outcome: Progressing     Problem: PAIN - ADULT  Goal: Verbalizes/displays adequate comfort level or baseline comfort level  Description: Interventions:  - Encourage patient to monitor pain and request assistance  - Assess pain using appropriate pain scale  - Administer analgesics based on type and severity of pain and evaluate response  - Implement non-pharmacological measures as appropriate and evaluate response  - Consider cultural and social influences on pain and pain management  - Notify physician/advanced practitioner if interventions unsuccessful or patient reports new pain  Outcome: Progressing     Problem: SAFETY ADULT  Goal: Patient will remain free of falls  Description: INTERVENTIONS:  - Educate patient/family on patient safety including physical limitations  - Instruct patient to call for assistance with activity   - Consult OT/PT to assist with strengthening/mobility   - Keep Call bell within reach  - Keep bed low and locked with side rails adjusted as appropriate  - Keep care items and personal belongings within reach  - Initiate and maintain comfort rounds  - Make Fall Risk Sign visible to staff  - Offer Toileting every 2 Hours, in advance of need  - Initiate/Maintain alarm  - Obtain necessary fall risk management equipment  - Apply yellow socks and bracelet for high fall risk patients  - Consider moving patient to room near nurses station  Outcome: Progressing  Goal: Maintain or return to baseline ADL function  Description: INTERVENTIONS:  -  Assess patient's ability to carry out ADLs; assess patient's baseline for ADL function and identify physical deficits which impact ability to perform ADLs (bathing, care of mouth/teeth, toileting, grooming, dressing, etc )  - Assess/evaluate cause of self-care deficits   - Assess range of motion  - Assess patient's mobility; develop plan if impaired  - Assess patient's need for assistive devices and provide as appropriate  - Encourage maximum independence but intervene and supervise when necessary  - Involve family in performance of ADLs  - Assess for home care needs following discharge   - Consider OT consult to assist with ADL evaluation and planning for discharge  - Provide patient education as appropriate  Outcome: Progressing  Goal: Maintains/Returns to pre admission functional level  Description: INTERVENTIONS:  - Perform BMAT or MOVE assessment daily    - Set and communicate daily mobility goal to care team and patient/family/caregiver  - Collaborate with rehabilitation services on mobility goals if consulted  - Perform Range of Motion 3 times a day  - Reposition patient every 3 hours    - Dangle patient 3 times a day  - Stand patient 3 times a day  - Ambulate patient 3 times a day  - Out of bed to chair 3 times a day   - Out of bed for meals 3 times a day  - Out of bed for toileting  - Record patient progress and toleration of activity level   Outcome: Progressing     Problem: DISCHARGE PLANNING  Goal: Discharge to home or other facility with appropriate resources  Description: INTERVENTIONS:  - Identify barriers to discharge w/patient and caregiver  - Arrange for needed discharge resources and transportation as appropriate  - Identify discharge learning needs (meds, wound care, etc )  - Arrange for interpretive services to assist at discharge as needed  - Refer to Case Management Department for coordinating discharge planning if the patient needs post-hospital services based on physician/advanced practitioner order or complex needs related to functional status, cognitive ability, or social support system  Outcome: Progressing     Problem: Knowledge Deficit  Goal: Patient/family/caregiver demonstrates understanding of disease process, treatment plan, medications, and discharge instructions  Description: Complete learning assessment and assess knowledge base    Interventions:  - Provide teaching at level of understanding  - Provide teaching via preferred learning methods  Outcome: Progressing

## 2022-02-13 NOTE — ASSESSMENT & PLAN NOTE
Lab Results   Component Value Date    EGFR 27 02/13/2022    EGFR 30 12/30/2021    EGFR 39 12/13/2021    CREATININE 2 02 (H) 02/13/2022    CREATININE 1 88 (H) 12/30/2021    CREATININE 1 54 (H) 12/13/2021     · History of CKD stage 3/4  · Stable baseline 2 02  · IV fluids and continued monitoring while inpatient

## 2022-02-13 NOTE — ASSESSMENT & PLAN NOTE
· Status post laparoscopic pyloroplasty with gastric stimulator implanted at [facility]  · Continue Protonix, Carafate  · GI following

## 2022-02-13 NOTE — CONSULTS
Consultation - General Surgery   Lupe Espinoza 46 y o  female MRN: [id number]  Unit/Bed#: [facility] 2 -01 Encounter: [id number]    Assessment/Plan     Assessment:  46 F past medical history of chronic kidney disease, Wegener's granulomatosis, diabetes, and longstanding gastroparesis status post laparoscopic pyloromyotomy and placement of gastric stimulator now presenting with intractable nausea and vomiting acute on chronically worsening  Afebrile, heart rate 70s, normal respiratory rate, hypertension, systolics 236K over 1 342Q, 97% on room air    Abdomen:  Soft, focally tender in the epigastrium rebound guarding    Multiple prior surgical scars noted, palpable upper abdominal mass consistent with gastric stimulator battery pack    COVID positive this admission    WBC 11 29, neutrophilia without bandemia  Hemoglobin 15 from baseline 13  Serum creatinine 2 02  Remainder of CMP largely unremarkable, lipase 468, mildly elevated  Lactic acid 4 7, now 0 9    CT scan of the abdomen and pelvis without contrast performed today showing stomach incompletely distended with food and gas, relatively normal appearing small bowel without evidence of pneumatosis or distension, stool burden throughout colon, on sagittal views the takeoff of the SMA and JOEL are appreciated without significant calcification    Plan:  -NPO  -IV fluid resuscitation  - may consider placement of nasogastric tube for decompression if persistent nausea or vomiting, however patient is currently just retching and may benefit more from antiemetic therapy  -serial abdominal examinations, trend endpoints of resuscitation and serum lactate  - low suspicion for mesenteric ischemia based upon presentation, examination and diagnostics thus far, however, if concern for clinical decline or worsening examination, patient may warrant diagnostic laparoscopy given lack of feasibility of contrast CTA given her renal function  -GI input appreciated, suspect that the underlying etiology of her presentation is most likely due to her gastroparesis and would warrant additional medical management as possible  -additional care as per primary team  -supportive measures for COVID  -DVT prophylaxis  -glycemic control      History of Present Illness   History, ROS and PFSH unobtainable from any source due to n/a  HPI:  Tasneem Do is a 46 y o  female who presents with acute on chronic abdominal pain which has been present since Wednesday and acutely worsened today with nausea, multiple episodes of retching and dry heaving unrelieved by home medication regimen  She has a past medical history significant for autoimmune vasculitis, diabetes mellitus complicated by longstanding gastroparesis status post prior laparoscopic pyloromyotomy and gastric stimulator placement at [facility]   At present, she notes persistent severe abdominal pain localized to the epigastrium which is minimally relieved by medication  She continues to have episodes of nausea and retching  She is not producing much in the way of formal emesis  She has passed flatus today, passed 2 loose bowel movements which were nonbloody  She denies fevers chills chest pain and shortness of breath  Also denies urinary symptoms and flank pain  She does report that she has had prior episodes of pain similar in character to this but that the present intensity is more than she can recall  Inpatient consult to Acute Care Surgery  Consult performed by: Johanny Hedrick MD  Consult ordered by: Yaritza Soria PA-C          Review of Systems   Constitutional: Positive for activity change and appetite change  Gastrointestinal: Positive for abdominal distention, abdominal pain, nausea and vomiting  Musculoskeletal: Positive for back pain and myalgias  Neurological: Positive for headaches  All other systems reviewed and are negative        Historical Information   Past Medical History:   Diagnosis Date    ADHD  Anemia of chronic disease     Anxiety     Asthma     Borderline personality disorder (HCC)     Cataplexy     Chronic abdominal pain     CKD (chronic kidney disease) stage 3, GFR 30-59 ml/min (HCC)     Cushing syndrome (HCC)     Diabetes mellitus ([facility] )     DVT (deep venous thrombosis) (HCC)     GERD (gastroesophageal reflux disease)     History of acute pancreatitis     felt secondary to Bactrim    History of transfusion     Hypertension     Liver disease     fatty liver    Microscopic polyangiitis (HCC)     Ovarian cyst     PTSD (post-traumatic stress disorder)     Self-inflicted injury     self inflicted skin wounds    Wegener's granulomatosis with renal involvement ([facility] )      Past Surgical History:   Procedure Laterality Date    ESOPHAGOGASTRODUODENOSCOPY  2015    mild antral gastritis    GASTRIC STIMULATOR IMPLANT SURGERY  2020    RI COLONOSCOPY FLX DX W/COLLJ SPEC WHEN PFRMD N/A 2018    adenoma removed from the transverse, hyperplastic polyp removed from the left colon    RI ESOPHAGOGASTRODUODENOSCOPY TRANSORAL DIAGNOSTIC N/A 2018    gastritis and scant coffee-ground material   Biopsies negative for H  pylori    RELEASE SCAR CONTRACTURE / GRAFT REPAIRS OF HAND Bilateral     UPPER GASTROINTESTINAL ENDOSCOPY  2019    Dr Savannah Mcgraw   Botox to the pylorus     Social History   Social History     Substance and Sexual Activity   Alcohol Use Never     Social History     Substance and Sexual Activity   Drug Use Yes    Types: Marijuana    Comment: marijuana daily     E-Cigarette/Vaping    E-Cigarette Use Never User      E-Cigarette/Vaping Substances    Nicotine No     THC No     CBD No      Social History     Tobacco Use   Smoking Status Former Smoker    Quit date:     Years since quittin 1   Smokeless Tobacco Never Used   Tobacco Comment    marijuana     Family History:   Family History   Problem Relation Age of Onset    No Known Problems Mother  No Known Problems Father     Colon cancer Neg Hx     Drug abuse Neg Hx         mother father    Mental illness Neg Hx         disorder, mother father    Cancer Neg Hx     Breast cancer Neg Hx        Meds/Allergies   current meds:   Current Facility-Administered Medications   Medication Dose Route Frequency    acetaminophen (TYLENOL) tablet 650 mg  650 mg Oral Q6H PRN    [START ON 2/14/2022] amLODIPine (NORVASC) tablet 5 mg  5 mg Oral Daily    buPROPion (WELLBUTRIN XL) 24 hr tablet 300 mg  300 mg Oral QAM    calcium carbonate (TUMS) chewable tablet 1,000 mg  1,000 mg Oral Daily PRN    heparin (porcine) subcutaneous injection 5,000 Units  5,000 Units Subcutaneous Q8H [address]    hydrALAZINE (APRESOLINE) injection 5 mg  5 mg Intravenous Q6H PRN    HYDROmorphone (DILAUDID) injection 0 5 mg  0 5 mg Intravenous Q4H PRN    insulin glargine (LANTUS) subcutaneous injection 22 Units 0 22 mL  22 Units Subcutaneous QAM    insulin lispro (HumaLOG) 100 units/mL subcutaneous injection 1-5 Units  1-5 Units Subcutaneous Q6H [facility]    Lidocaine Viscous HCl (XYLOCAINE) 2 % mucosal solution 15 mL  15 mL Swish & Spit 4x Daily PRN    montelukast (SINGULAIR) tablet 10 mg  10 mg Oral HS    ondansetron (ZOFRAN) injection 4 mg  4 mg Intravenous Q4H PRN    oxyCODONE (ROXICODONE) immediate release tablet 10 mg  10 mg Oral Q4H PRN    pantoprazole (PROTONIX) injection 40 mg  40 mg Intravenous Q12H PAULINE    pregabalin (LYRICA) capsule 50 mg  50 mg Oral BID    saccharomyces boulardii (FLORASTOR) capsule 250 mg  250 mg Oral BID    scopolamine (TRANSDERM-SCOP) 1 mg/3 days TD 72 hr patch 1 patch  1 patch Transdermal Q72H    sodium chloride 0 9 % infusion  125 mL/hr Intravenous Continuous    sucralfate (CARAFATE) tablet 1 g  1 g Oral TID     Allergies   Allergen Reactions    Prozac [Fluoxetine Hcl]      SI    Bactrim [Sulfamethoxazole-Trimethoprim]      Pt "They think that is what cause the pancreatitis"     Flagyl [Metronidazole] Diarrhea and Abdominal Pain    Lamictal [Lamotrigine] GI Intolerance    Lithium Other (See Comments)    Haldol [Haloperidol] Other (See Comments)     "I don't like it"    Ibuprofen     Lexapro [Escitalopram Oxalate] Rash    Navane [Thiothixene]      SI    Other      "novaine?" antipsychotic       Objective   First Vitals:   Blood Pressure: (!) 208/111 (02/13/22 0734)  Pulse: 70 (02/13/22 0734)  Temperature: 97 9 °F (36 6 °C) (02/13/22 0734)  Temp Source: Oral (02/13/22 0734)  Respirations: 18 (02/13/22 0734)  Height: 5' 2" (157 5 cm) (02/13/22 0734)  Weight - Scale: 63 5 kg (139 lb 15 9 oz) (02/13/22 0734)  SpO2: 99 % (02/13/22 0734)    Current Vitals:   Blood Pressure: (!) 174/106 (02/13/22 1036)  Pulse: 76 (02/13/22 1036)  Temperature: 98 1 °F (36 7 °C) (02/13/22 1036)  Temp Source: Oral (02/13/22 0734)  Respirations: 19 (02/13/22 1036)  Height: 5' 2" (157 5 cm) (02/13/22 0734)  Weight - Scale: 63 5 kg (139 lb 15 9 oz) (02/13/22 0734)  SpO2: 97 % (02/13/22 1036)    No intake or output data in the 24 hours ending 02/13/22 1244    Invasive Devices  Report    Peripheral Intravenous Line            Peripheral IV 02/13/22 Right Hand <1 day                Physical Exam  Vitals reviewed  Constitutional:       Appearance: She is not toxic-appearing or diaphoretic  HENT:      Head: Normocephalic and atraumatic  Mouth/Throat:      Mouth: Mucous membranes are moist    Eyes:      Pupils: Pupils are equal, round, and reactive to light  Cardiovascular:      Rate and Rhythm: Normal rate and regular rhythm  Pulmonary:      Effort: Pulmonary effort is normal  No respiratory distress  Abdominal:      General: A surgical scar is present  There is no distension  Palpations: Abdomen is soft  Tenderness: There is abdominal tenderness in the epigastric area  There is no guarding or rebound  Musculoskeletal:         General: No swelling, tenderness or deformity  Cervical back: No rigidity  Lymphadenopathy:      Cervical: No cervical adenopathy  Skin:     General: Skin is warm and dry  Capillary Refill: Capillary refill takes 2 to 3 seconds  Neurological:      Mental Status: She is alert and oriented to person, place, and time  Mental status is at baseline  Lab Results:   I have personally reviewed pertinent lab results  , CBC:   Lab Results   Component Value Date    WBC 11 29 (H) 02/13/2022    HGB 15 0 02/13/2022    HCT 44 7 02/13/2022    MCV 91 02/13/2022     02/13/2022    MCH 30 7 02/13/2022    MCHC 33 6 02/13/2022    RDW 12 6 02/13/2022    MPV 10 7 02/13/2022    NRBC 0 02/13/2022   , CMP:   Lab Results   Component Value Date    SODIUM 143 02/13/2022    K 4 5 02/13/2022     (H) 02/13/2022    CO2 19 (L) 02/13/2022    BUN 35 (H) 02/13/2022    CREATININE 2 02 (H) 02/13/2022    CALCIUM 9 5 02/13/2022    AST 18 02/13/2022    ALT 28 02/13/2022    ALKPHOS 118 (H) 02/13/2022    EGFR 27 02/13/2022   , Coagulation:   Lab Results   Component Value Date    INR 0 92 02/13/2022   , Urinalysis:   Lab Results   Component Value Date    COLORU Yellow 02/13/2022    CLARITYU Clear 02/13/2022    SPECGRAV 1 025 02/13/2022    PHUR 5 5 02/13/2022    LEUKOCYTESUR Negative 02/13/2022    NITRITE Negative 02/13/2022    GLUCOSEU Negative 02/13/2022    KETONESU Negative 02/13/2022    BILIRUBINUR Negative 02/13/2022    BLOODU Small (A) 02/13/2022   , Amylase: No results found for: AMYLASE, Lipase:   Lab Results   Component Value Date    LIPASE 468 (H) 02/13/2022     Imaging: I have personally reviewed pertinent reports  and I have personally reviewed pertinent films in PACS  EKG, Pathology, and Other Studies: I have personally reviewed pertinent reports  and I have personally reviewed pertinent films in PACS    CT abdomen pelvis wo contrast    Result Date: 2/13/2022  Impression: No acute CT abnormality to account for the patient's abdominal symptoms   If warranted, consider follow-up GI consultation   Workstation performed: [id number]

## 2022-02-13 NOTE — CONSULTS
Patient MRN: [id number]  Date of Service: 2/13/2022  Referring Physician: Katelyn Gerardo PA-C  Provider Creating Note: Patsy Soria PA-C  PCP: Jaquelin Vigil  Reason for Consult:  Abdominal pain, nausea and vomiting  HPI  Jacqueline Ruiz is a 46 y o  female who was admitted with abdominal pain  She has a past medical history of Wegener's granulomatosis, diabetes mellitus, chronic kidney disease, DVT  She has a long history of gastroparesis and has undergone Botox injections and gastric stimulator placement and pyloroplasty  She follows with Dr Sheryl Adams at [facility]  She was seen in our office 1/28 and upper endoscopy was scheduled but was rescheduled due to patient reporting that she was positive for COVID  She is positive for COVID this admission, full physical exam and interview were not performed  She has had multiple ER visits and hospitalizations for abdominal pain, nausea and vomiting  Her weight is down nearly 20 lb since 02/2021  CT of the abdomen and pelvis without contrast today shows an adrenal myelolipoma and ingested food and air in the stomach      Past Medical History:   Diagnosis Date    ADHD     Anemia of chronic disease     Anxiety     Asthma     Borderline personality disorder (HCC)     Cataplexy     Chronic abdominal pain     CKD (chronic kidney disease) stage 3, GFR 30-59 ml/min (HCC)     Cushing syndrome (HCC)     Diabetes mellitus ([address] )     DVT (deep venous thrombosis) (HCC)     GERD (gastroesophageal reflux disease)     History of acute pancreatitis     felt secondary to Bactrim    History of transfusion     Hypertension     Liver disease     fatty liver    Microscopic polyangiitis (HCC)     Ovarian cyst     PTSD (post-traumatic stress disorder)     Self-inflicted injury     self inflicted skin wounds    Wegener's granulomatosis with renal involvement ([facility] [address] 2015     Past Surgical History:   Procedure Laterality Date    ESOPHAGOGASTRODUODENOSCOPY 09/11/2015    mild antral gastritis    GASTRIC STIMULATOR IMPLANT SURGERY  06/25/2020    VA COLONOSCOPY FLX DX W/COLLJ SPEC WHEN PFRMD N/A 12/14/2018    adenoma removed from the transverse, hyperplastic polyp removed from the left colon    VA ESOPHAGOGASTRODUODENOSCOPY TRANSORAL DIAGNOSTIC N/A 12/14/2018    gastritis and scant coffee-ground material   Biopsies negative for H  pylori    RELEASE SCAR CONTRACTURE / GRAFT REPAIRS OF HAND Bilateral     UPPER GASTROINTESTINAL ENDOSCOPY  12/26/2019    Dr Philipp Oviedo  Botox to the pylorus     Medications  Home Medications:   Prior to Admission medications    Medication Sig Start Date End Date Taking?  Authorizing Provider   Alcohol Swabs (Alcohol Pads) 70 % PADS Use 4 (four) times a day 12/1/21   Katarzyna Thrasher PA-C   amphetamine-dextroamphetamine (ADDERALL XR) 20 MG 24 hr capsule Take 1 capsule (20 mg total) by mouth 2 (two) times a day Max Daily Amount: 40 mg 1/21/22   Katarzyna Thrasher PA-C   Blood Glucose Monitoring Suppl (FreeStyle Lite) DEIRDRE Use daily 7/23/21   Katarzyna Thrasher PA-C   buPROPion (WELLBUTRIN XL) 300 mg 24 hr tablet Take 1 tablet (300 mg total) by mouth every morning 1/18/22   Katarzyna Thrasher PA-C   clobetasol (TEMOVATE) 0 05 % cream Apply topically 2 (two) times a day 12/29/21   Katarzyna Thrasher PA-C   DULoxetine (Cymbalta) 30 mg delayed release capsule Take 1 capsule (30 mg total) by mouth daily 1/18/22   Katarzyna Thrasher PA-C   Easy Comfort Lancets MISC USE TO TEST THE BLOOD SUGAR THREE TIMES A DAY 3/1/21   Katarzyna Thrasher PA-C   glucose blood (FREESTYLE LITE) test strip Use as instructed 1/27/22   Katarzyna Thrasher PA-C   hydrOXYzine HCL (ATARAX) 25 mg tablet  1/14/22   Historical Provider, MD   insulin aspart (NovoLOG FlexPen) 100 UNIT/ML injection pen Inject 6 Units under the skin 3 (three) times a day with meals 12/27/21   Katarzyna Thrasher PA-C   insulin glargine (Toujeo Max SoloStar) 300 units/mL CONCENTRATED U-300 injection pen (2-unit dial) Inject 22 Units under the skin daily 12/27/21   Katarzyna Thrasher PA-C   Insulin Pen Needle (PEN NEEDLES) 31G X 8 MM MISC Inject 1 Stick under the skin 4 (four) times a day (with meals and at bedtime) 3/28/18   Salvador Ann PA-C   lidocaine (XYLOCAINE) 5 % ointment APPLY TOPICALLY 2GM TWICE A DAY TO AFFECTED AREAS AS NEEDED FOR MILD PAIN 12/10/20   Katarzyna Thrasher PA-C   Lidocaine Viscous HCl (XYLOCAINE) 2 % mucosal solution Swish and spit 15 mL 4 (four) times a day as needed for mouth pain or discomfort 7/16/21   Lawrence Chiang MD   linaCLOtide (Linzess) 72 MCG CAPS Take 72 mcg by mouth daily 2/18/21   Evan Wheeler PA-C   montelukast (SINGULAIR) 10 mg tablet TAKE ONE TABLET BY MOUTH ONCE DAILY AT BEDTIME 1/17/22   Katarzyna Thrasher PA-C   ondansetron (ZOFRAN) 4 mg tablet Take 1 tablet (4 mg total) by mouth every 8 (eight) hours as needed for nausea or vomiting 1/25/22   Katarzyna Thrasher PA-C   oxyCODONE (ROXICODONE) 10 MG TABS Take 1 tablet (10 mg total) by mouth every 4 (four) hours as needed for moderate pain Max Daily Amount: 60 mg 1/26/22   Katarzyna Thrasher PA-C   pantoprazole (PROTONIX) 40 mg tablet Take 1 tablet (40 mg total) by mouth 2 (two) times a day before meals 1/18/22   Katarzyna Thrasher PA-C   pregabalin (LYRICA) 50 mg capsule Take 1 capsule (50 mg total) by mouth 2 (two) times a day 1/31/22   Katarzyna Thrasher PA-C   Probiotic Product (PROBIOTIC-10 PO) Take 1 tablet by mouth daily    Historical Provider, MD   promethazine (PHENERGAN) 25 mg tablet Take 1 tablet (25 mg total) by mouth every 6 (six) hours as needed for nausea or vomiting  Patient not taking: Reported on 12/23/2021  5/10/21   Radha Alcocer PA-C   Restasis 0 05 % ophthalmic emulsion  12/29/21   Historical Provider, MD   scopolamine (TRANSDERM-SCOP) 1 5 mg/3 days TD 72 hr patch Place 1 patch on the skin every third day 4/27/21   Jonathan Hyde PA-C   sucralfate (CARAFATE) 1 g/10 mL suspension Take 10 mL (1 g total) by mouth 3 (three) times a day 1/28/22   Jane Soria PA-C       Inhouse Medications    Current Facility-Administered Medications:     HYDROmorphone (DILAUDID) injection 0 5 mg, 0 5 mg, Intravenous, Q4H PRN    ondansetron (ZOFRAN) injection 4 mg, 4 mg, Intravenous, Q4H PRN    Allergies  Allergies   Allergen Reactions    Prozac [Fluoxetine Hcl]      SI    Bactrim [Sulfamethoxazole-Trimethoprim]      Pt "They think that is what cause the pancreatitis"     Flagyl [Metronidazole] Diarrhea and Abdominal Pain    Lamictal [Lamotrigine] GI Intolerance    Lithium Other (See Comments)    Haldol [Haloperidol] Other (See Comments)     "I don't like it"    Ibuprofen     Lexapro [Escitalopram Oxalate] Rash    Navane [Thiothixene]      SI    Other      "novaine?" antipsychotic     Social History   reports that she quit smoking about 11 years ago  She has never used smokeless tobacco  She reports current drug use  Drug: Marijuana  She reports that she does not drink alcohol    Family History  Family History   Problem Relation Age of Onset    No Known Problems Mother     No Known Problems Father     Colon cancer Neg Hx     Drug abuse Neg Hx         mother father    Mental illness Neg Hx         disorder, mother father    Cancer Neg Hx     Breast cancer Neg Hx          Objective   Vitals  BP (!) 174/106   Pulse 76   Temp 98 1 °F (36 7 °C)   Resp 19   Ht 5' 2" (1 575 m)   Wt 63 5 kg (139 lb 15 9 oz)   LMP  (LMP Unknown)   SpO2 97%   BMI 25 60 kg/m²       Laboratory Studies  Lab Results   Component Value Date    WBC 11 29 (H) 02/13/2022    HGB 15 0 02/13/2022    HCT 44 7 02/13/2022     02/13/2022    MCV 91 02/13/2022     Lab Results   Component Value Date    CREATININE 2 02 (H) 02/13/2022    BUN 35 (H) 02/13/2022    SODIUM 143 02/13/2022    K 4 5 02/13/2022     (H) 02/13/2022    CO2 19 (L) 02/13/2022    GLUC 138 02/13/2022    CALCIUM 9 5 02/13/2022    ALKPHOS 118 (H) 02/13/2022    ALB 4 1 02/13/2022 TBILI 0 21 02/13/2022    AST 18 02/13/2022    ALT 28 02/13/2022     Lab Results   Component Value Date    PROTIME 12 2 02/13/2022    INR 0 92 02/13/2022       Imaging and Other Studies  CT A/P no contrast    FINDINGS:  ABDOMEN  LOWER CHEST:  No clinically significant abnormality identified in the visualized lower chest            LIVER/BILIARY TREE:  Unremarkable         GALLBLADDER:  No calcified gallstones  No pericholecystic inflammatory change            SPLEEN:  Unremarkable         PANCREAS:  Unremarkable         ADRENAL GLANDS:  Stable 1 6 cm low-density lesion arising from the posterior limb of the left adrenal gland with Hounsfield units measuring approximately -10  Findings most compatible with a benign adrenal myelolipoma  No further imaging is necessary      Right adrenal gland normal         KIDNEYS/URETERS:  Unremarkable  No hydronephrosis           STOMACH AND BOWEL:    The stomach is incompletely distended with ingested food products and air  Gastric stimulator is present      No evidence of small bowel obstruction      The colon is relatively decompressed and inadequately evaluated  Scattered diverticula of the descending colon and sigmoid colon  Nothing to suggest acute diverticulitis         APPENDIX:  No findings to suggest appendicitis           ABDOMINOPELVIC CAVITY:  No ascites  No pneumoperitoneum  No lymphadenopathy         VESSELS:  Unremarkable for patient's age               PELVIS  REPRODUCTIVE ORGANS:  Stable low-density lesion in the left adnexa, most compatible with a simple cyst         URINARY BLADDER:  Unremarkable            ABDOMINAL WALL/INGUINAL REGIONS:  Spinal degenerative        OSSEOUS STRUCTURES:  No acute fracture or destructive osseous lesion               IMPRESSION:  No acute CT abnormality to account for the patient's abdominal symptoms      If warranted, consider follow-up GI consultation      Assessment and Plan:  1   Chronic abdominal pain, nausea, vomiting with history of gastroparesis  Last EGD 05/2021 unremarkable  Stomach distended with food and air on CT  Would keep patient NPO  Suggest surgical consult to rule out ischemia with history of Wegener's  Active Problems:    * No active hospital problems   *      Jane Soria PA-C

## 2022-02-14 PROBLEM — U07.1 COVID: Status: ACTIVE | Noted: 2022-02-14

## 2022-02-14 PROBLEM — N18.4 STAGE 4 CHRONIC KIDNEY DISEASE (HCC): Status: ACTIVE | Noted: 2021-11-27

## 2022-02-14 PROBLEM — G89.29 CHRONIC PAIN: Status: ACTIVE | Noted: 2022-02-14

## 2022-02-14 LAB
4HR DELTA HS TROPONIN: 3 NG/L
ANION GAP SERPL CALCULATED.3IONS-SCNC: 10 MMOL/L (ref 4–13)
BASOPHILS # BLD AUTO: 0 THOUSANDS/ΜL (ref 0–0.1)
BASOPHILS NFR BLD AUTO: 0 % (ref 0–1)
BUN SERPL-MCNC: 25 MG/DL (ref 5–25)
CALCIUM SERPL-MCNC: 7.8 MG/DL (ref 8.3–10.1)
CARDIAC TROPONIN I PNL SERPL HS: 8 NG/L
CHLORIDE SERPL-SCNC: 109 MMOL/L (ref 100–108)
CO2 SERPL-SCNC: 21 MMOL/L (ref 21–32)
CREAT SERPL-MCNC: 1.69 MG/DL (ref 0.6–1.3)
EOSINOPHIL # BLD AUTO: 0.01 THOUSAND/ΜL (ref 0–0.61)
EOSINOPHIL NFR BLD AUTO: 0 % (ref 0–6)
ERYTHROCYTE [DISTWIDTH] IN BLOOD BY AUTOMATED COUNT: 12.8 % (ref 11.6–15.1)
GFR SERPL CREATININE-BSD FRML MDRD: 34 ML/MIN/1.73SQ M
GLUCOSE P FAST SERPL-MCNC: 85 MG/DL (ref 65–99)
GLUCOSE SERPL-MCNC: 102 MG/DL (ref 65–140)
GLUCOSE SERPL-MCNC: 104 MG/DL (ref 65–140)
GLUCOSE SERPL-MCNC: 61 MG/DL (ref 65–140)
GLUCOSE SERPL-MCNC: 71 MG/DL (ref 65–140)
GLUCOSE SERPL-MCNC: 74 MG/DL (ref 65–140)
GLUCOSE SERPL-MCNC: 78 MG/DL (ref 65–140)
GLUCOSE SERPL-MCNC: 84 MG/DL (ref 65–140)
GLUCOSE SERPL-MCNC: 85 MG/DL (ref 65–140)
HCT VFR BLD AUTO: 32.6 % (ref 34.8–46.1)
HGB BLD-MCNC: 10.9 G/DL (ref 11.5–15.4)
IMM GRANULOCYTES # BLD AUTO: 0.01 THOUSAND/UL (ref 0–0.2)
IMM GRANULOCYTES NFR BLD AUTO: 0 % (ref 0–2)
LYMPHOCYTES # BLD AUTO: 1.77 THOUSANDS/ΜL (ref 0.6–4.47)
LYMPHOCYTES NFR BLD AUTO: 41 % (ref 14–44)
MCH RBC QN AUTO: 30.2 PG (ref 26.8–34.3)
MCHC RBC AUTO-ENTMCNC: 33.4 G/DL (ref 31.4–37.4)
MCV RBC AUTO: 90 FL (ref 82–98)
MONOCYTES # BLD AUTO: 0.63 THOUSAND/ΜL (ref 0.17–1.22)
MONOCYTES NFR BLD AUTO: 14 % (ref 4–12)
NEUTROPHILS # BLD AUTO: 1.95 THOUSANDS/ΜL (ref 1.85–7.62)
NEUTS SEG NFR BLD AUTO: 45 % (ref 43–75)
NRBC BLD AUTO-RTO: 0 /100 WBCS
PLATELET # BLD AUTO: 208 THOUSANDS/UL (ref 149–390)
PMV BLD AUTO: 10.1 FL (ref 8.9–12.7)
POTASSIUM SERPL-SCNC: 3.7 MMOL/L (ref 3.5–5.3)
RBC # BLD AUTO: 3.61 MILLION/UL (ref 3.81–5.12)
SODIUM SERPL-SCNC: 140 MMOL/L (ref 136–145)
WBC # BLD AUTO: 4.37 THOUSAND/UL (ref 4.31–10.16)

## 2022-02-14 PROCEDURE — NC001 PR NO CHARGE: Performed by: SURGERY

## 2022-02-14 PROCEDURE — C9113 INJ PANTOPRAZOLE SODIUM, VIA: HCPCS | Performed by: PHYSICIAN ASSISTANT

## 2022-02-14 PROCEDURE — 84484 ASSAY OF TROPONIN QUANT: CPT | Performed by: PHYSICIAN ASSISTANT

## 2022-02-14 PROCEDURE — 80048 BASIC METABOLIC PNL TOTAL CA: CPT | Performed by: PHYSICIAN ASSISTANT

## 2022-02-14 PROCEDURE — 82948 REAGENT STRIP/BLOOD GLUCOSE: CPT

## 2022-02-14 PROCEDURE — 85025 COMPLETE CBC W/AUTO DIFF WBC: CPT | Performed by: PHYSICIAN ASSISTANT

## 2022-02-14 PROCEDURE — 99232 SBSQ HOSP IP/OBS MODERATE 35: CPT | Performed by: INTERNAL MEDICINE

## 2022-02-14 RX ORDER — INSULIN GLARGINE 100 [IU]/ML
12 INJECTION, SOLUTION SUBCUTANEOUS EVERY MORNING
Status: DISCONTINUED | OUTPATIENT
Start: 2022-02-15 | End: 2022-02-15 | Stop reason: HOSPADM

## 2022-02-14 RX ORDER — BUDESONIDE AND FORMOTEROL FUMARATE DIHYDRATE 160; 4.5 UG/1; UG/1
2 AEROSOL RESPIRATORY (INHALATION) 2 TIMES DAILY
Status: DISCONTINUED | OUTPATIENT
Start: 2022-02-14 | End: 2022-02-15 | Stop reason: HOSPADM

## 2022-02-14 RX ORDER — MELATONIN
2000 DAILY
Status: DISCONTINUED | OUTPATIENT
Start: 2022-02-14 | End: 2022-02-15 | Stop reason: HOSPADM

## 2022-02-14 RX ORDER — BUDESONIDE 0.5 MG/2ML
0.5 INHALANT ORAL
Status: DISCONTINUED | OUTPATIENT
Start: 2022-02-14 | End: 2022-02-14

## 2022-02-14 RX ORDER — INSULIN GLARGINE 100 [IU]/ML
18 INJECTION, SOLUTION SUBCUTANEOUS EVERY MORNING
Status: DISCONTINUED | OUTPATIENT
Start: 2022-02-14 | End: 2022-02-14

## 2022-02-14 RX ADMIN — INSULIN GLARGINE 18 UNITS: 100 INJECTION, SOLUTION SUBCUTANEOUS at 12:01

## 2022-02-14 RX ADMIN — HEPARIN SODIUM 5000 UNITS: 5000 INJECTION INTRAVENOUS; SUBCUTANEOUS at 13:39

## 2022-02-14 RX ADMIN — SODIUM CHLORIDE 125 ML/HR: 0.9 INJECTION, SOLUTION INTRAVENOUS at 11:56

## 2022-02-14 RX ADMIN — PREGABALIN 50 MG: 50 CAPSULE ORAL at 18:10

## 2022-02-14 RX ADMIN — MONTELUKAST 10 MG: 10 TABLET, FILM COATED ORAL at 22:33

## 2022-02-14 RX ADMIN — OXYCODONE HYDROCHLORIDE 10 MG: 10 TABLET ORAL at 16:19

## 2022-02-14 RX ADMIN — OXYCODONE HYDROCHLORIDE 10 MG: 10 TABLET ORAL at 03:56

## 2022-02-14 RX ADMIN — SUCRALFATE 1 G: 1 TABLET ORAL at 09:27

## 2022-02-14 RX ADMIN — BUDESONIDE AND FORMOTEROL FUMARATE DIHYDRATE 2 PUFF: 160; 4.5 AEROSOL RESPIRATORY (INHALATION) at 18:12

## 2022-02-14 RX ADMIN — Medication 250 MG: at 18:10

## 2022-02-14 RX ADMIN — SODIUM CHLORIDE 125 ML/HR: 0.9 INJECTION, SOLUTION INTRAVENOUS at 03:57

## 2022-02-14 RX ADMIN — PANTOPRAZOLE SODIUM 40 MG: 40 INJECTION, POWDER, FOR SOLUTION INTRAVENOUS at 09:26

## 2022-02-14 RX ADMIN — SUCRALFATE 1 G: 1 TABLET ORAL at 16:19

## 2022-02-14 RX ADMIN — SUCRALFATE 1 G: 1 TABLET ORAL at 20:23

## 2022-02-14 RX ADMIN — PREGABALIN 50 MG: 50 CAPSULE ORAL at 09:26

## 2022-02-14 RX ADMIN — Medication 2000 UNITS: at 12:01

## 2022-02-14 RX ADMIN — ONDANSETRON 4 MG: 2 INJECTION INTRAMUSCULAR; INTRAVENOUS at 22:33

## 2022-02-14 RX ADMIN — HEPARIN SODIUM 5000 UNITS: 5000 INJECTION INTRAVENOUS; SUBCUTANEOUS at 22:37

## 2022-02-14 RX ADMIN — AMLODIPINE BESYLATE 5 MG: 5 TABLET ORAL at 09:26

## 2022-02-14 RX ADMIN — OXYCODONE HYDROCHLORIDE 10 MG: 10 TABLET ORAL at 09:29

## 2022-02-14 RX ADMIN — ACETAMINOPHEN 650 MG: 325 TABLET, FILM COATED ORAL at 16:19

## 2022-02-14 RX ADMIN — OXYCODONE HYDROCHLORIDE 10 MG: 10 TABLET ORAL at 20:23

## 2022-02-14 RX ADMIN — BUPROPION HYDROCHLORIDE 300 MG: 150 TABLET, EXTENDED RELEASE ORAL at 09:27

## 2022-02-14 RX ADMIN — PANTOPRAZOLE SODIUM 40 MG: 40 INJECTION, POWDER, FOR SOLUTION INTRAVENOUS at 22:33

## 2022-02-14 RX ADMIN — ACETAMINOPHEN 650 MG: 325 TABLET, FILM COATED ORAL at 03:56

## 2022-02-14 RX ADMIN — Medication 250 MG: at 09:27

## 2022-02-14 RX ADMIN — HEPARIN SODIUM 5000 UNITS: 5000 INJECTION INTRAVENOUS; SUBCUTANEOUS at 05:00

## 2022-02-14 NOTE — ASSESSMENT & PLAN NOTE
· Patient has a history of essential hypertension  · Continue amlodipine 5 milligrams daily  · Blood pressure currently adequately controlled 127/76  · Previously noted to be accelerated, likely secondary to pain  · Continue medications and monitor, titrate as needed

## 2022-02-14 NOTE — ASSESSMENT & PLAN NOTE
· Patient presented with intractable nausea/vomiting  · She notes this felt identical to her previous flares of her gastroparesis  · She notes she has several episodes a month which she usually manages at home:  She maintains herself NPO, rests in bed, until the episode passes  · Patient [facility] for her refractory gastroparesis  · Status post laparoscopic pyloroplasty with gastric stimulator:  Patient notes marked improvement after the placement of her gastric stimulator  · She [facility] with [facility] and has an outpatient upper endoscopy planned  · Patient was also evaluated by the surgical team who notes outpatient consideration for subtotal gastrectomy with gastrojejunotomy tube  · Continue Protonix, Carafate  · Patient's previous intractable nausea/vomiting has improved:   Will start clear liquid diet and monitor

## 2022-02-14 NOTE — UTILIZATION REVIEW
Initial Clinical Review    OBSERVATION 2/13/22 @ 0952 - CHANGED TO INPATIENT ON 2/14/22 @ 1024 - IV FLUIDS, ADVANCING DIET AND TREATMENT OF INTRACTABLE N/V  Admission: Date/Time/Statement:   Inpatient Admission Orders (From admission, onward)     Ordered        02/14/22 1024  Inpatient Admission  Once                      Orders Placed This Encounter   Procedures    Inpatient Admission     Standing Status:   Standing     Number of Occurrences:   1     Order Specific Question:   Level of Care     Answer:   Med Surg [16]     Order Specific Question:   Estimated length of stay     Answer:   More than 2 Midnights     Order Specific Question:   Certification     Answer:   I certify that inpatient services are medically necessary for this patient for a duration of greater than two midnights  See H&P and MD Progress Notes for additional information about the patient's course of treatment  ED Arrival Information     Expected Arrival Acuity    - 2/13/2022 07:09 Urgent         Means of arrival Escorted by Service Admission type    [facility] [place] ([address]) Hospitalist Urgent         Arrival complaint    abd pain vomiting        Chief Complaint   Patient presents with    Vomiting     Initial Presentation:   Ms Tiffany Mtz is a 46 yof who presents to the ED via EMS from home with c/o severe upper abd pain since AM with intractable N/V  In the ED she was treated with IV Dilaudid with some relief but continued nausea post Zofran and Reglan  Follows with [facility] and GI at [facility]  She was also found to be covid + and mentions headaches, congestion and loss of taste x several days  In the ED she has elevated lactic acid  She is on IV fluids  PMH: Justa's granulomatosis, CKD, HTN on Norvasc, GERD, IDDM, gastroparesis, h/o pancreatitis  she is admitted to OBSERVATION status with Intractable abd pain - consult GI, consult surgery, PRN analgesia, NPO, PRN antiemetics    Gastroparesis - Protonix, Carafate  IDDM - SSI cover  ARF on CKD - creat 2 02 - IV fluids and trend  2/13 GI Consult - long h/o gastroparesis and has undergone Botox injections and gastric stimulator placement and pyloroplasty  Chronic abdominal pain, nausea, vomiting with history of gastroparesis  Stomach distended with food and air on CT, keep NPO, surgical consult to r/o ischemia  2/13 Surgery Consult - intractable N/V acute on chronically worsening  On exam, Soft, focally tender in the epigastrium rebound guarding  WBC 11 29 w/o bandemia  NPO, IV fluids, consider NGT if persistent N/V or antiemetics, serial abd exams, low suspicion for mesenteric ischemia, if worsening pt may need diagnostic lap  Likely d/t gastroparesis    Date: 2/14   Day 2:  N/V resolving, pain decreasing, passing flatus with improved appetite this morning  Lactic acid resolved  No symptoms with Covid and is in mild category, Vit 3 and Budesomide  Continues on Protonix and carafate  Will start clear liquid diet today and monitor  Pt has good glucose control  On exam today abd is nondistended and w/o pain        ED Triage Vitals   Temperature Pulse Respirations Blood Pressure SpO2   02/13/22 0734 02/13/22 0734 02/13/22 0734 02/13/22 0734 02/13/22 0734   97 9 °F (36 6 °C) 70 18 (!) 208/111 99 %      Temp Source Heart Rate Source Patient Position - Orthostatic VS BP Location FiO2 (%)   02/13/22 0734 02/13/22 0734 02/13/22 0734 02/13/22 0734 --   Oral Monitor Lying Right arm       Pain Score       02/13/22 0727       10 - Worst Possible Pain          Wt Readings from Last 1 Encounters:   02/13/22 63 5 kg (139 lb 15 9 oz)     Additional Vital Signs:   02/14/22 08:03:23 98 8 °F (37 1 °C) 63 18 127/76 93 95 % -- --   02/13/22 2130 -- -- -- -- -- -- None (Room air) --   02/13/22 21:09:42 98 5 °F (36 9 °C) 69 19 138/81 100 99 % -- --   02/13/22 16:00:38 97 5 °F (36 4 °C) 55 18 165/95 118 99 % None (Room air) Lying   02/13/22 1100 -- -- -- -- -- 96 % None (Room air) --   02/13/22 10:36:46 98 1 °F (36 7 °C) 76 19 174/106 Abnormal  129 97 % -- --   02/13/22 0755 -- -- -- 178/88 Abnormal  -- -- -- --     Pertinent Labs/Diagnostic Test Results:     2/13 CT AP - No acute CT abnormality to account for the patient's abdominal symptoms  2/13 ECG - Unusual P axis, possible ectopic atrial bradycardia  Nonspecific ST and T wave abnormality  When compared with ECG of 27-NOV-2021 02:04,  Ectopic atrial rhythm has replaced Sinus rhythm    Results from last 7 days   Lab Units 02/13/22  0732   SARS-COV-2  Positive*     Results from last 7 days   Lab Units 02/14/22  0508 02/13/22  0731   WBC Thousand/uL 4 37 11 29*   HEMOGLOBIN g/dL 10 9* 15 0   HEMATOCRIT % 32 6* 44 7   PLATELETS Thousands/uL 208 317   NEUTROS ABS Thousands/µL 1 95 9 25*         Results from last 7 days   Lab Units 02/14/22  0508 02/13/22  0731   SODIUM mmol/L 140 143   POTASSIUM mmol/L 3 7 4 5   CHLORIDE mmol/L 109* 109*   CO2 mmol/L 21 19*   ANION GAP mmol/L 10 15*   BUN mg/dL 25 35*   CREATININE mg/dL 1 69* 2 02*   EGFR ml/min/1 73sq m 34 27   CALCIUM mg/dL 7 8* 9 5     Results from last 7 days   Lab Units 02/13/22  0731   AST U/L 18   ALT U/L 28   ALK PHOS U/L 118*   TOTAL PROTEIN g/dL 8 4*   ALBUMIN g/dL 4 1   TOTAL BILIRUBIN mg/dL 0 21     Results from last 7 days   Lab Units 02/14/22  0923 02/14/22  0620 02/14/22  0030 02/13/22  1549 02/13/22  1119   POC GLUCOSE mg/dl 71 78 84 90 125     Results from last 7 days   Lab Units 02/14/22  0508 02/13/22  0731   GLUCOSE RANDOM mg/dL 85 138     Results from last 7 days   Lab Units 02/14/22  0508 02/13/22  1241 02/13/22  0756   HS TNI 0HR ng/L  --   --  5   HS TNI 2HR ng/L  --  4  --    HSTNI D2 ng/L  --  -1  --    HS TNI 4HR ng/L 8  --   --    HSTNI D4 ng/L 3  --   --          Results from last 7 days   Lab Units 02/13/22  0756   PROTIME seconds 12 2   INR  0 92   PTT seconds 37             Results from last 7 days   Lab Units 02/13/22  1242 02/13/22  0817   LACTIC ACID mmol/L 0 9 4 7*     Results from last 7 days   Lab Units 02/13/22  0731   LIPASE u/L 468*                 Results from last 7 days   Lab Units 02/13/22  0914   CLARITY UA  Clear   COLOR UA  Yellow   SPEC GRAV UA  1 025   PH UA  5 5   GLUCOSE UA mg/dl Negative   KETONES UA mg/dl Negative   BLOOD UA  Small*   PROTEIN UA mg/dl >=300*   NITRITE UA  Negative   BILIRUBIN UA  Negative   UROBILINOGEN UA E U /dl 0 2   LEUKOCYTES UA  Negative   WBC UA /hpf 0-1*   RBC UA /hpf 0-1*   BACTERIA UA /hpf None Seen   EPITHELIAL CELLS WET PREP /hpf Occasional     Results from last 7 days   Lab Units 02/13/22  0732   INFLUENZA A PCR  Negative   INFLUENZA B PCR  Negative   RSV PCR  Negative     ED Treatment:   Medication Administration from 02/13/2022 0709 to 02/13/2022 1028    Date/Time Order Dose Route Action   02/13/2022 0728 sodium chloride 0 9 % bolus 1,000 mL 1,000 mL Intravenous New Bag   02/13/2022 0727 ondansetron (ZOFRAN) injection 4 mg 4 mg Intravenous Given   02/13/2022 0727 HYDROmorphone (DILAUDID) injection 0 5 mg 0 5 mg Intravenous Given   02/13/2022 0816 metoclopramide (REGLAN) injection 10 mg 10 mg Intravenous Given        Past Medical History:   Diagnosis Date    ADHD     Anemia of chronic disease     Anxiety     Asthma     Borderline personality disorder (HCC)     Cataplexy     Chronic abdominal pain     CKD (chronic kidney disease) stage 3, GFR 30-59 ml/min (HCC)     Cushing syndrome (HCC)     Diabetes mellitus ([facility] [address] )     DVT (deep venous thrombosis) (HCC)     GERD (gastroesophageal reflux disease)     History of acute pancreatitis     felt secondary to Bactrim    History of transfusion     Hypertension     Liver disease     fatty liver    Microscopic polyangiitis (HCC)     Ovarian cyst     PTSD (post-traumatic stress disorder)     Self-inflicted injury     self inflicted skin wounds    Wegener's granulomatosis with renal involvement ([facility] [address] ) 2015     Present on Admission:   Intractable abdominal pain   Bipolar 1 disorder (HCC)   CKD (chronic kidney disease)   Granulomatosis with polyangiitis (HCC)   Benign hypertension with CKD (chronic kidney disease) stage IV (HCC)   Gastroparesis      Admitting Diagnosis: Vomiting [R11 10]  Epigastric abdominal pain [R10 13]  HTN (hypertension) [I10]  Abdominal pain [R10 9]  CKD (chronic kidney disease) [N18 9]  Elevated lactic acid level [R79 89]  Nausea vomiting and diarrhea [R11 2, R19 7]  COVID [U07 1]  Age/Sex: 46 y o  female  Admission Orders:  Scheduled Medications:  amLODIPine, 5 mg, Oral, Daily  buPROPion, 300 mg, Oral, QAM  heparin (porcine), 5,000 Units, Subcutaneous, Q8H [address]  insulin glargine, 22 Units, Subcutaneous, QAM  insulin lispro, 1-5 Units, Subcutaneous, Q6H PAULINE  montelukast, 10 mg, Oral, HS  pantoprazole, 40 mg, Intravenous, Q12H PAULINE  pregabalin, 50 mg, Oral, BID  saccharomyces boulardii, 250 mg, Oral, BID  scopolamine, 1 patch, Transdermal, Q72H  sucralfate, 1 g, Oral, TID      Continuous IV Infusions:  sodium chloride, 125 mL/hr, Intravenous, Continuous      PRN Meds:  acetaminophen, 650 mg, Oral, Q6H PRN -x1 2/13, 2/14  calcium carbonate, 1,000 mg, Oral, Daily PRN  hydrALAZINE, 5 mg, Intravenous, Q6H PRN  HYDROmorphone, 0 5 mg, Intravenous, Q4H PRN - x 2 2/13  Lidocaine Viscous HCl, 15 mL, Swish & Spit, 4x Daily PRN  ondansetron, 4 mg, Intravenous, Q4H PRN -x 1 2/13  oxyCODONE, 10 mg, Oral, Q4H PRN -x 1 2/13, x 2 2/14        IP CONSULT TO GASTROENTEROLOGY  IP CONSULT TO ACUTE CARE SURGERY    Network Utilization Review Department  ATTENTION: Please call with any questions or concerns to [phone] and carefully listen to the prompts so that you are directed to the right person  All voicemails are confidential   [facility] all requests for admission clinical reviews, approved or denied determinations and any other requests to dedicated fax number below belonging to the campus where the patient is receiving treatment   List of dedicated fax numbers for the Facilities:  [address] DENIALS (Administrative/Medical Necessity) [phone]   [address] (Maternity/NICU/Pediatrics) 261 [facility],[address]  [address] [facility] [place] [address] [address] [place] [address] [address] [facility] [facility] [phone]

## 2022-02-14 NOTE — PLAN OF CARE
Problem: NEUROSENSORY - ADULT  Goal: Achieves maximal functionality and self care  Description: INTERVENTIONS  - Monitor swallowing and airway patency with patient fatigue and changes in neurological status  - Encourage and assist patient to increase activity and self care     - Encourage visually impaired, hearing impaired and aphasic patients to use assistive/communication devices  Outcome: Progressing     Problem: GASTROINTESTINAL - ADULT  Goal: Maintains or returns to baseline bowel function  Description: INTERVENTIONS:  - Assess bowel function  - Encourage oral fluids to ensure adequate hydration  - Administer IV fluids if ordered to ensure adequate hydration  - Administer ordered medications as needed  - Encourage mobilization and activity  - Consider nutritional services referral to assist patient with adequate nutrition and appropriate food choices  Outcome: Progressing  Goal: Maintains adequate nutritional intake  Description: INTERVENTIONS:  - Monitor percentage of each meal consumed  - Identify factors contributing to decreased intake, treat as appropriate  - Assist with meals as needed  - Monitor I&O, weight, and lab values if indicated  - Obtain nutrition services referral as needed  Outcome: Progressing     Problem: METABOLIC, FLUID AND ELECTROLYTES - ADULT  Goal: Electrolytes maintained within normal limits  Description: INTERVENTIONS:  - Monitor labs and assess patient for signs and symptoms of electrolyte imbalances  - Administer electrolyte replacement as ordered  - Monitor response to electrolyte replacements, including repeat lab results as appropriate  - Instruct patient on fluid and nutrition as appropriate  Outcome: Progressing  Goal: Fluid balance maintained  Description: INTERVENTIONS:  - Monitor labs   - Monitor I/O and WT  - Instruct patient on fluid and nutrition as appropriate  - Assess for signs & symptoms of volume excess or deficit  Outcome: Progressing  Goal: Glucose maintained within target range  Description: INTERVENTIONS:  - Monitor Blood Glucose as ordered  - Assess for signs and symptoms of hyperglycemia and hypoglycemia  - Administer ordered medications to maintain glucose within target range  - Assess nutritional intake and initiate nutrition service referral as needed  Outcome: Progressing     Problem: PAIN - ADULT  Goal: Verbalizes/displays adequate comfort level or baseline comfort level  Description: Interventions:  - Encourage patient to monitor pain and request assistance  - Assess pain using appropriate pain scale  - Administer analgesics based on type and severity of pain and evaluate response  - Implement non-pharmacological measures as appropriate and evaluate response  - Consider cultural and social influences on pain and pain management  - Notify physician/advanced practitioner if interventions unsuccessful or patient reports new pain  Outcome: Progressing     Problem: SAFETY ADULT  Goal: Patient will remain free of falls  Description: INTERVENTIONS:  - Educate patient/family on patient safety including physical limitations  - Instruct patient to call for assistance with activity   - Consult OT/PT to assist with strengthening/mobility   - Keep Call bell within reach  - Keep bed low and locked with side rails adjusted as appropriate  - Keep care items and personal belongings within reach  - Initiate and maintain comfort rounds  - Make Fall Risk Sign visible to staff  - Offer Toileting every 2 Hours, in advance of need  - Initiate/Maintain alarm  - Obtain necessary fall risk management equipment  - Apply yellow socks and bracelet for high fall risk patients  - Consider moving patient to room near nurses station  Outcome: Progressing  Goal: Maintain or return to baseline ADL function  Description: INTERVENTIONS:  -  Assess patient's ability to carry out ADLs; assess patient's baseline for ADL function and identify physical deficits which impact ability to perform ADLs (bathing, care of mouth/teeth, toileting, grooming, dressing, etc )  - Assess/evaluate cause of self-care deficits   - Assess range of motion  - Assess patient's mobility; develop plan if impaired  - Assess patient's need for assistive devices and provide as appropriate  - Encourage maximum independence but intervene and supervise when necessary  - Involve family in performance of ADLs  - Assess for home care needs following discharge   - Consider OT consult to assist with ADL evaluation and planning for discharge  - Provide patient education as appropriate  Outcome: Progressing  Goal: Maintains/Returns to pre admission functional level  Description: INTERVENTIONS:  - Perform BMAT or MOVE assessment daily    - Set and communicate daily mobility goal to care team and patient/family/caregiver  - Collaborate with rehabilitation services on mobility goals if consulted  - Perform Range of Motion 3 times a day  - Reposition patient every 3 hours    - Dangle patient 3 times a day  - Stand patient 3 times a day  - Ambulate patient 3 times a day  - Out of bed to chair 3 times a day   - Out of bed for meals 3 times a day  - Out of bed for toileting  - Record patient progress and toleration of activity level   Outcome: Progressing     Problem: DISCHARGE PLANNING  Goal: Discharge to home or other facility with appropriate resources  Description: INTERVENTIONS:  - Identify barriers to discharge w/patient and caregiver  - Arrange for needed discharge resources and transportation as appropriate  - Identify discharge learning needs (meds, wound care, etc )  - Arrange for interpretive services to assist at discharge as needed  - Refer to Case Management Department for coordinating discharge planning if the patient needs post-hospital services based on physician/advanced practitioner order or complex needs related to functional status, cognitive ability, or social support system  Outcome: Progressing     Problem: Knowledge Deficit  Goal: Patient/family/caregiver demonstrates understanding of disease process, treatment plan, medications, and discharge instructions  Description: Complete learning assessment and assess knowledge base  Interventions:  - Provide teaching at level of understanding  - Provide teaching via preferred learning methods  Outcome: Progressing     Problem: MOBILITY - ADULT  Goal: Maintain or return to baseline ADL function  Description: INTERVENTIONS:  -  Assess patient's ability to carry out ADLs; assess patient's baseline for ADL function and identify physical deficits which impact ability to perform ADLs (bathing, care of mouth/teeth, toileting, grooming, dressing, etc )  - Assess/evaluate cause of self-care deficits   - Assess range of motion  - Assess patient's mobility; develop plan if impaired  - Assess patient's need for assistive devices and provide as appropriate  - Encourage maximum independence but intervene and supervise when necessary  - Involve family in performance of ADLs  - Assess for home care needs following discharge   - Consider OT consult to assist with ADL evaluation and planning for discharge  - Provide patient education as appropriate  Outcome: Progressing  Goal: Maintains/Returns to pre admission functional level  Description: INTERVENTIONS:  - Perform BMAT or MOVE assessment daily    - Set and communicate daily mobility goal to care team and patient/family/caregiver  - Collaborate with rehabilitation services on mobility goals if consulted  - Perform Range of Motion 3 times a day  - Reposition patient every 3 hours    - Dangle patient 3 times a day  - Stand patient 3 times a day  - Ambulate patient 3 times a day  - Out of bed to chair 3 times a day   - Out of bed for meals 3 times a day  - Out of bed for toileting  - Record patient progress and toleration of activity level   Outcome: Progressing     Problem: Potential for Falls  Goal: Patient will remain free of falls  Description: INTERVENTIONS:  - Educate patient/family on patient safety including physical limitations  - Instruct patient to call for assistance with activity   - Consult OT/PT to assist with strengthening/mobility   - Keep Call bell within reach  - Keep bed low and locked with side rails adjusted as appropriate  - Keep care items and personal belongings within reach  - Initiate and maintain comfort rounds  - Make Fall Risk Sign visible to staff  - Offer Toileting every 2 Hours, in advance of need  - Initiate/Maintain alarm  - Obtain necessary fall risk management equipment  - Apply yellow socks and bracelet for high fall risk patients  - Consider moving patient to room near nurses station  Outcome: Progressing     Problem: GASTROINTESTINAL - ADULT  Goal: Minimal or absence of nausea and/or vomiting  Description: INTERVENTIONS:  - Administer IV fluids if ordered to ensure adequate hydration  - Maintain NPO status until nausea and vomiting are resolved  - Nasogastric tube if ordered  - Administer ordered antiemetic medications as needed  - Provide nonpharmacologic comfort measures as appropriate  - Advance diet as tolerated, if ordered  - Consider nutrition services referral to assist patient with adequate nutrition and appropriate food choices  Outcome: Completed

## 2022-02-14 NOTE — ASSESSMENT & PLAN NOTE
Patient has a history of chronic pain  Is prescribed opioids p r n   As an outpatient  PA- website was queried however the website is currently undergoing maintenance and unable to be evaluated

## 2022-02-14 NOTE — ASSESSMENT & PLAN NOTE
Lab Results   Component Value Date    HGBA1C 5 7 09/29/2021       Recent Labs     02/13/22  1549 02/14/22  0030 02/14/22  0620 02/14/22  0923   POCGLU 90 84 78 71       Blood Sugar Average: Last 72 hrs:  · (P) 89 6   ·   · Patient has history of diabetes type 2, with long-term use of insulin, with associated neuropathy, and chronic kidney disease stage 3-4  · Maintained on toujeo 22 units q a m at home  · continue lantus, and SSI while inpatient  · Continue to monitor and titrate as needed  · Hold mealtime insulin while NPO  · Place on sliding scale insulin with Accu-Cheks

## 2022-02-14 NOTE — UTILIZATION REVIEW
Inpatient Admission Authorization Request   NOTIFICATION OF INPATIENT ADMISSION/INPATIENT AUTHORIZATION REQUEST   SERVICING FACILITY:   [address] [place] [address], [facility], [facility][note 3] [facility]  Tax ID: [id number]  NPI: [id number]  Place of Service: Inpatient [facility]  [address]  Place of Service Code: 24     ATTENDING PROVIDER:  Attending Name and NPI#: Jeffrey Maher [place] [[id number]]  Address: [address] [place] [address], [facility], [facility][note 3] [facility]  Phone: [phone]     UTILIZATION REVIEW CONTACT:  [facility] Utilization   Network Utilization Review Department  Phone: [phone]  Fax: [phone]  Email: [email or web address]     PHYSICIAN ADVISORY SERVICES:  FOR FHEY-IN-KCJO REVIEW - MEDICAL NECESSITY DENIAL  Phone: [phone]  Fax: [phone]  Email: [email or web address]     TYPE OF REQUEST:  Inpatient Status     ADMISSION INFORMATION:  ADMISSION DATE/TIME: 2/14/22 10:24 AM  PATIENT DIAGNOSIS CODE/DESCRIPTION:  Vomiting [R11 10]  Epigastric abdominal pain [R10 13]  HTN (hypertension) [I10]  Abdominal pain [R10 9]  CKD (chronic kidney disease) [N18 9]  Elevated lactic acid level [R79 89]  Nausea vomiting and diarrhea [R11 2, R19 7]  COVID [U07 1]  DISCHARGE DATE/TIME: No discharge date for patient encounter  DISCHARGE DISPOSITION (IF DISCHARGED): Home/Self Care     IMPORTANT INFORMATION:  Please contact the [facility] directly with any questions or concerns regarding this request  Department voicemails are confidential     Send requests for admission clinical reviews, concurrent reviews, approvals, and administrative denials due to lack of clinical to fax [phone]

## 2022-02-14 NOTE — PROGRESS NOTES
[facility]  Progress Note - Rajani John 1970, 46 y o  female MRN: [id number]  Unit/Bed#: [address] 202-01 Encounter: [id number]  Primary Care Provider: Teri Mcneil PA-C   Date and time admitted to hospital: 2/13/2022  7:09 AM    * Intractable abdominal pain  Assessment & Plan  · Patient presented to the ED intractable of abdominal pain with nausea and vomiting  · 2/13 CT scan abdomen/pelvis without any acute abnormality to account for her symptoms  · Patient was evaluated by the GI team, who notes a history of chronic gastroparesis and chronic abdominal pain  · Due to elevated lactic acid and her history of Justa's she was evaluated by the surgery team   There was no evidence of ischemia  · Patient was maintained NPO, given IV fluids, analgesics, and antiemetics and has improved  · She notes her abdominal pain has improved  · She relates she has chronic, intermittent abdominal pain secondary to her gastroparesis, with flares several times a month    Megan Bustillos 22  Patient is currently unvaccinated  Neftaly Ramirez from 1 of her caregivers:  Was exposed 12 days ago  Patient currently denies any symptoms  No evidence of hypoxia  Outpatient treatment algorithm, patient is in the [facility] category  Will prescribe vitamin D 3 and budesonide    Gastroparesis  Assessment & Plan  · Patient presented with intractable nausea/vomiting  · She notes this felt identical to her previous flares of her gastroparesis  · She notes she has several episodes a month which she usually manages at home:  She maintains herself NPO, rests in bed, until the episode passes    · Patient follows at [facility] for her refractory gastroparesis  · Status post laparoscopic pyloroplasty with gastric stimulator:  Patient notes marked improvement after the placement of her gastric stimulator  · She follows with [facility] and has an outpatient upper endoscopy planned  · Patient was also evaluated by the surgical team who notes outpatient consideration for subtotal gastrectomy with gastrojejunotomy tube  · Continue Protonix, Carafate  · Patient's previous intractable nausea/vomiting has improved: Will start clear liquid diet and monitor    Chronic pain  Assessment & Plan  Patient has a history of chronic pain  Is prescribed opioids p r n  As an outpatient  PA- website was queried however the website is currently undergoing maintenance and unable to be evaluated    Stage 4 chronic kidney disease [facility])  Assessment & Plan  Lab Results   Component Value Date    EGFR 34 02/14/2022    EGFR 27 02/13/2022    EGFR 30 12/30/2021    CREATININE 1 69 (H) 02/14/2022    CREATININE 2 02 (H) 02/13/2022    CREATININE 1 88 (H) 12/30/2021     · History of CKD stage 3-4  · Patient's creatinine appears variable, based on old records, ranging 1 5-2 0  · Current creatinine at her baseline    Continue to monitor  · Patient was given IV fluid hydration    Neuropathy  Assessment & Plan  Patient has history of peripheral neuropathy  Currently on Lyrica    Bipolar 1 disorder ([facility] [address] )  Assessment & Plan  · Continue Wellbutrin  · Continue outpatient follow-up with her therapist, psychiatrist, with the assistance of her ICM    Benign essential HTN  Assessment & Plan  · Patient has a history of essential hypertension  · Continue amlodipine 5 milligrams daily  · Blood pressure currently adequately controlled 127/76  · Previously noted to be accelerated, likely secondary to pain  · Continue medications and monitor, titrate as needed    Granulomatosis with polyangiitis ([facility] [address] )  Assessment & Plan  · History of Justa's granulomatosis  · Continue outpatient follow-up    Type 2 diabetes mellitus with stage 4 chronic kidney disease, with long-term current use of insulin [facility])  Assessment & Plan  Lab Results   Component Value Date    HGBA1C 5 7 09/29/2021       Recent Labs     02/13/22  1549 02/14/22  0030 02/14/22  0620 02/14/22  8341 POCGLU 90 84 78 71       Blood Sugar Average: Last 72 hrs:  · (P) 89 6   ·   · Patient has history of diabetes type 2, with long-term use of insulin, with associated neuropathy, and chronic kidney disease stage 3-4  · Maintained on toujeo 22 units q a m at home  · continue lantus, and SSI while inpatient  · Continue to monitor and titrate as needed  · Hold mealtime insulin while NPO  · Place on sliding scale insulin with Accu-Cheks          Family:  Called brother Miko Soler and LM to call my cell number       dw pts nurse  dw     VTE Pharmacologic Prophylaxis: Heparin  VTE Mechanical Prophylaxis: sequential compression device        Certification Statement: The patient will continue to require additional inpatient hospital stay due to need for further acute intervention for intractable n/v    Status: inpatient     ===================================================================    Subjective:  Patient relates she has had gastroparesis for many years  Notes that she has multiple flares every month which she usually is able to manage at home  She notes when she gets a flare she has pain throughout her abdomen with nausea and vomiting  She notes she stays in bed, does not eat anything and waits for the episode to pass  She notes with a severe episode she comes to the ER  Patient notes she feels better today  She notes her nausea and vomiting have stopped  She is willing to try sips of clear liquid  She notes she feels hungry  Abdominal pain also has markedly improved  She relates her pain was throughout her anterior abdomen  Patient notes her episodes of gastroparesis improved when she had the gastric stimulator placed  She still has several episodes every month  She notes she has a history of chronic pain  She relates she has neuropathy in her feet as was well as up her legs  She also has chronic shoulder pain      Patient notes she contracted COVID from her caregiver 12 days ago   She denies any shortness of breath, chest congestion, phlegm  She notes she had a previous headache, which she attributed to COVID, which has since resolved    She denies any pain anywhere else  Denies any other complaints    She notes she lives at home with caregivers  Physical Exam:   Temp:  [97 5 °F (36 4 °C)-98 8 °F (37 1 °C)] 98 8 °F (37 1 °C)  HR:  [55-76] 63  Resp:  [18-19] 18  BP: (127-174)/() 127/76    Gen:  Pleasant, non-tachypnic, non-dyspnic  Conversant  Able speak in complete sentences without having to stop for dyspnea  Heart: regular rate and rhythm, S1S2 present, no murmur, rub or gallop  Lungs:  Decreased breath sounds in both bases with decreased air movement, however clear to ausculatation bilaterally  No wheezing, crackles, or rhonchi  No accessory muscle use or respiratory distress  Abd: soft, non-tender, non-distended  NABS, no guarding, rebound or peritoneal signs  Extremities: no clubbing, cyanosis or edema  2+pedal pulses bilaterally  Neuro: awake, alert  Fluent speech  Interactive  Follows commands  Cooperates with exam  Skin: warm and dry: no petechiae, purpura and rash  LABS:   Results from last 7 days   Lab Units 02/14/22  0508 02/13/22  0731   WBC Thousand/uL 4 37 11 29*   HEMOGLOBIN g/dL 10 9* 15 0   HEMATOCRIT % 32 6* 44 7   PLATELETS Thousands/uL 208 317     Results from last 7 days   Lab Units 02/14/22  0508 02/13/22  0731   POTASSIUM mmol/L 3 7 4 5   CHLORIDE mmol/L 109* 109*   CO2 mmol/L 21 19*   BUN mg/dL 25 35*   CREATININE mg/dL 1 69* 2 02*   CALCIUM mg/dL 7 8* 9 5       Hospital Data:    2/13:  CT abdomen/pelvis  No acute CT abnormality to account for the patient's abdominal symptoms  Microbiology  2/13 positive COVID  Negative influenza, RSV           ---------------------------------------------------------------------------------------------------------------  This note has been constructed using a voice recognition system

## 2022-02-14 NOTE — ASSESSMENT & PLAN NOTE
· Continue Wellbutrin  · Continue outpatient follow-up with her therapist, psychiatrist, with the assistance of her ICM

## 2022-02-14 NOTE — ASSESSMENT & PLAN NOTE
Patient is currently unvaccinated  Contacted COVID from 1 of her caregivers:  Was exposed 12 days ago  Patient currently denies any symptoms  No evidence of hypoxia  Outpatient treatment algorithm, patient is in the [facility] category    Will prescribe vitamin D 3 and budesonide

## 2022-02-14 NOTE — PROGRESS NOTES
Progress Note - General Surgery   Jay Watkins 46 y o  female MRN: [id number]  Unit/Bed#: [address] 2 -01 Encounter: [id number]    Assessment:  46 F with past history of Justa's granulomatosis, diabetes, longstanding gastroparesis status post pyloromyotomy and placement of gastric stimulator now presenting with acute on chronic recurrent abdominal pain secondary to gastroparesis, resolving  VSS, afebrile  200 cc recorded urine output plus multiple unmeasured voids    Abdomen soft, nontender, nondistended  Serum creatinine 1 69, remainder of BMP within normal limits today    Serum lactate cleared on 2/13  Plan:  -trial of clear liquid diet  -management of gastroparesis as per GI  -supplemental IV fluid hydration  -pain/nausea control  -no acute surgical intervention this admission  -recommend outpatient surgical follow-up to discuss the possibility of subtotal gastrectomy with gastrojejunostomy for persistent symptoms in the face of currently attempted surgical management  -additional care as per primary  -disposition planning as per primary  -general surgery to sign off at this point, please feel free to call with questions    Subjective/Objective       Subjective:  No acute distress, nausea and vomiting resolving, abdominal pain decreasing, rested comfortably overnight passing flatus and endorses an appetite this morning    Objective:     Blood pressure 138/81, pulse 69, temperature 98 5 °F (36 9 °C), resp  rate 19, height 5' 2" (1 575 m), weight 63 5 kg (139 lb 15 9 oz), SpO2 99 %, not currently breastfeeding  ,Body mass index is 25 6 kg/m²        Intake/Output Summary (Last 24 hours) at 2/14/2022 0653  Last data filed at 2/14/2022 0038  Gross per 24 hour   Intake 1000 ml   Output 200 ml   Net 800 ml       Invasive Devices  Report    Peripheral Intravenous Line            Peripheral IV 02/13/22 Right Hand <1 day                Physical Exam:    General:  No acute distress  HEENT:  Normocephalic, atraumatic  Cardiovascular:  Regular rate, regular rhythm  Respiratory:  No distress, room air  Abdomen:  Soft, nontender, nondistended  Extremities:  No clubbing, cyanosis, or edema  Neuro:  AAO x3  Skin:  Warm, dry      Lab, Imaging and other studies:  I have personally reviewed pertinent lab results    , CBC:   Lab Results   Component Value Date    WBC 11 29 (H) 02/13/2022    HGB 15 0 02/13/2022    HCT 44 7 02/13/2022    MCV 91 02/13/2022     02/13/2022    MCH 30 7 02/13/2022    MCHC 33 6 02/13/2022    RDW 12 6 02/13/2022    MPV 10 7 02/13/2022    NRBC 0 02/13/2022   , CMP:   Lab Results   Component Value Date    SODIUM 140 02/14/2022    K 3 7 02/14/2022     (H) 02/14/2022    CO2 21 02/14/2022    BUN 25 02/14/2022    CREATININE 1 69 (H) 02/14/2022    CALCIUM 7 8 (L) 02/14/2022    AST 18 02/13/2022    ALT 28 02/13/2022    ALKPHOS 118 (H) 02/13/2022    EGFR 34 02/14/2022     VTE Pharmacologic Prophylaxis: Heparin  VTE Mechanical Prophylaxis: sequential compression device

## 2022-02-14 NOTE — ASSESSMENT & PLAN NOTE
· Patient presented to the ED intractable of abdominal pain with nausea and vomiting  · 2/13 CT scan abdomen/pelvis without any acute abnormality to account for her symptoms  · Patient was evaluated by the GI team, who notes a history of chronic gastroparesis and chronic abdominal pain  · Due to elevated lactic acid and her history of Justa's she was evaluated by the surgery team   There was no evidence of ischemia  · Patient was maintained NPO, given IV fluids, analgesics, and antiemetics and has improved  · She notes her abdominal pain has improved  · She relates she has chronic, intermittent abdominal pain secondary to her gastroparesis, with flares several times a month

## 2022-02-15 VITALS
BODY MASS INDEX: 25.76 KG/M2 | SYSTOLIC BLOOD PRESSURE: 141 MMHG | DIASTOLIC BLOOD PRESSURE: 85 MMHG | HEART RATE: 78 BPM | RESPIRATION RATE: 17 BRPM | HEIGHT: 62 IN | WEIGHT: 139.99 LBS | OXYGEN SATURATION: 97 % | TEMPERATURE: 98.2 F

## 2022-02-15 DIAGNOSIS — K59.00 CONSTIPATION, UNSPECIFIED CONSTIPATION TYPE: ICD-10-CM

## 2022-02-15 PROBLEM — R10.9 INTRACTABLE ABDOMINAL PAIN: Status: RESOLVED | Noted: 2017-12-21 | Resolved: 2022-02-15

## 2022-02-15 LAB
ANION GAP SERPL CALCULATED.3IONS-SCNC: 8 MMOL/L (ref 4–13)
BASOPHILS # BLD AUTO: 0.01 THOUSANDS/ΜL (ref 0–0.1)
BASOPHILS NFR BLD AUTO: 0 % (ref 0–1)
BUN SERPL-MCNC: 23 MG/DL (ref 5–25)
CALCIUM SERPL-MCNC: 8.4 MG/DL (ref 8.3–10.1)
CHLORIDE SERPL-SCNC: 108 MMOL/L (ref 100–108)
CO2 SERPL-SCNC: 26 MMOL/L (ref 21–32)
CREAT SERPL-MCNC: 1.71 MG/DL (ref 0.6–1.3)
EOSINOPHIL # BLD AUTO: 0.01 THOUSAND/ΜL (ref 0–0.61)
EOSINOPHIL NFR BLD AUTO: 0 % (ref 0–6)
ERYTHROCYTE [DISTWIDTH] IN BLOOD BY AUTOMATED COUNT: 12.9 % (ref 11.6–15.1)
GFR SERPL CREATININE-BSD FRML MDRD: 34 ML/MIN/1.73SQ M
GLUCOSE SERPL-MCNC: 63 MG/DL (ref 65–140)
GLUCOSE SERPL-MCNC: 71 MG/DL (ref 65–140)
GLUCOSE SERPL-MCNC: 82 MG/DL (ref 65–140)
HCT VFR BLD AUTO: 39.2 % (ref 34.8–46.1)
HGB BLD-MCNC: 12.8 G/DL (ref 11.5–15.4)
IMM GRANULOCYTES # BLD AUTO: 0.01 THOUSAND/UL (ref 0–0.2)
IMM GRANULOCYTES NFR BLD AUTO: 0 % (ref 0–2)
LYMPHOCYTES # BLD AUTO: 2.54 THOUSANDS/ΜL (ref 0.6–4.47)
LYMPHOCYTES NFR BLD AUTO: 50 % (ref 14–44)
MCH RBC QN AUTO: 29.8 PG (ref 26.8–34.3)
MCHC RBC AUTO-ENTMCNC: 32.7 G/DL (ref 31.4–37.4)
MCV RBC AUTO: 91 FL (ref 82–98)
MONOCYTES # BLD AUTO: 0.51 THOUSAND/ΜL (ref 0.17–1.22)
MONOCYTES NFR BLD AUTO: 10 % (ref 4–12)
NEUTROPHILS # BLD AUTO: 2.04 THOUSANDS/ΜL (ref 1.85–7.62)
NEUTS SEG NFR BLD AUTO: 40 % (ref 43–75)
NRBC BLD AUTO-RTO: 0 /100 WBCS
PLATELET # BLD AUTO: 238 THOUSANDS/UL (ref 149–390)
PMV BLD AUTO: 10.3 FL (ref 8.9–12.7)
POTASSIUM SERPL-SCNC: 3.5 MMOL/L (ref 3.5–5.3)
RBC # BLD AUTO: 4.3 MILLION/UL (ref 3.81–5.12)
SODIUM SERPL-SCNC: 142 MMOL/L (ref 136–145)
WBC # BLD AUTO: 5.12 THOUSAND/UL (ref 4.31–10.16)

## 2022-02-15 PROCEDURE — 99232 SBSQ HOSP IP/OBS MODERATE 35: CPT | Performed by: PHYSICIAN ASSISTANT

## 2022-02-15 PROCEDURE — C9113 INJ PANTOPRAZOLE SODIUM, VIA: HCPCS | Performed by: PHYSICIAN ASSISTANT

## 2022-02-15 PROCEDURE — 80048 BASIC METABOLIC PNL TOTAL CA: CPT | Performed by: INTERNAL MEDICINE

## 2022-02-15 PROCEDURE — 99239 HOSP IP/OBS DSCHRG MGMT >30: CPT | Performed by: INTERNAL MEDICINE

## 2022-02-15 PROCEDURE — 82948 REAGENT STRIP/BLOOD GLUCOSE: CPT

## 2022-02-15 PROCEDURE — 85025 COMPLETE CBC W/AUTO DIFF WBC: CPT | Performed by: INTERNAL MEDICINE

## 2022-02-15 RX ORDER — LINACLOTIDE 72 UG/1
CAPSULE, GELATIN COATED ORAL
Qty: 90 CAPSULE | Refills: 10 | Status: SHIPPED | OUTPATIENT
Start: 2022-02-15

## 2022-02-15 RX ORDER — MELATONIN
2000 DAILY
Qty: 40 TABLET | Refills: 0 | Status: SHIPPED | OUTPATIENT
Start: 2022-02-16 | End: 2022-06-15

## 2022-02-15 RX ADMIN — OXYCODONE HYDROCHLORIDE 10 MG: 10 TABLET ORAL at 10:48

## 2022-02-15 RX ADMIN — BUPROPION HYDROCHLORIDE 300 MG: 150 TABLET, EXTENDED RELEASE ORAL at 08:30

## 2022-02-15 RX ADMIN — AMLODIPINE BESYLATE 5 MG: 5 TABLET ORAL at 08:30

## 2022-02-15 RX ADMIN — Medication 2000 UNITS: at 08:30

## 2022-02-15 RX ADMIN — PANTOPRAZOLE SODIUM 40 MG: 40 INJECTION, POWDER, FOR SOLUTION INTRAVENOUS at 08:31

## 2022-02-15 RX ADMIN — Medication 250 MG: at 08:30

## 2022-02-15 RX ADMIN — OXYCODONE HYDROCHLORIDE 10 MG: 10 TABLET ORAL at 05:27

## 2022-02-15 RX ADMIN — HEPARIN SODIUM 5000 UNITS: 5000 INJECTION INTRAVENOUS; SUBCUTANEOUS at 05:24

## 2022-02-15 RX ADMIN — BUDESONIDE AND FORMOTEROL FUMARATE DIHYDRATE 2 PUFF: 160; 4.5 AEROSOL RESPIRATORY (INHALATION) at 08:32

## 2022-02-15 RX ADMIN — PREGABALIN 50 MG: 50 CAPSULE ORAL at 08:30

## 2022-02-15 RX ADMIN — SUCRALFATE 1 G: 1 TABLET ORAL at 08:30

## 2022-02-15 NOTE — QUICK NOTE
Called to see pt, after rang call bell and found down on the floor  Pt explains that she has a h/o cataplexy  She notes at home she has aides  She does not ambulate with walker, but uses wheelchair  When preparing for discharge pt ambulated 3 steps to get her belongings  She notes by the 3rd step she felt her cataplexy taking affect, with leg weakness, and did not wish to fall so she lowered herself down on her right knee  She denies any pain or trauma  She notes this is something that handles daily at home  She notes she dressed herself, while kneeling on the floor and then called her nurse to assist her back to bed  She denies any fall  She denies any knee pain  She denies any other new or changed pain anywhere else in her body  She denies any dizziness or lightheadedness  She notes when she stood up she did not feel dizzy or lightheaded  She clarifies that this is an episode happens frequently at home      Patient wishes discharge home, she notes her home health aide is here waiting to pick her up  Physical exam  General:  Awake, alert, makes eye contact  Conversant  Smiling and interactive  Extremities:   right knee:  No abrasion, no injury    Range of motion noted

## 2022-02-15 NOTE — ASSESSMENT & PLAN NOTE
· Continue Wellbutrin  · Patient will resume her Adderall and Cymbalta at discharge  · Continue outpatient follow-up with her therapist, psychiatrist, with the assistance of her ICM

## 2022-02-15 NOTE — NURSING NOTE
Discharge paperwork and meds reviewed with patient  Answered all questions and concerns  IV and darleen removed  Will continue to monitor

## 2022-02-15 NOTE — PROGRESS NOTES
Progress Note- Josh Walls 46 y o  female MRN: [id number]    Unit/Bed#: [address] 202-01 Encounter: [id number]      Assessment and Plan:    77-year-old female with Justa's granulomatosis, CKD, hypertension GERD, diabetes who presented with abdominal pain  1  Gastroparesis  The patient initially presented with intractable abdominal pain with nausea and vomiting  CT scan was obtained without any acute abnormality to account for her symptoms  She was seen by surgery given her history of Justa's and ischemia was ruled bladder     Her symptoms improved and her diet was advanced, she is currently tolerating a regular diet  No GI complaints at this time  - continue outpatient management for gastroparesis  - per General surgery, the patient may benefit from bariatric evaluation as an outpatient for subtotal gastrectomy    GI to sign off, please do not hesitate to contact with further questions or concerns    ______________________________________________________________________    Subjective: The patient has nausea and abdominal pain had resolved  She is currently tolerating a regular diet      Medication Administration - last 24 hours from 02/14/2022 1250 to 02/15/2022 1250       Date/Time Order Dose Route Action Action by     02/14/2022 2233 ondansetron (ZOFRAN) injection 4 mg 4 mg Intravenous Given Anant Barahona RN     02/15/2022 0830 buPROPion (WELLBUTRIN XL) 24 hr tablet 300 mg 300 mg Oral Given Kristine De Souza RN     02/15/2022 0831 pantoprazole (PROTONIX) injection 40 mg 40 mg Intravenous Given Kristine De Souza, GIAN     02/14/2022 2233 pantoprazole (PROTONIX) injection 40 mg 40 mg Intravenous Given Anant Barahona RN     02/15/2022 0830 pregabalin (LYRICA) capsule 50 mg 50 mg Oral Given Kristine De Souza, GIAN     02/14/2022 1810 pregabalin (LYRICA) capsule 50 mg 50 mg Oral Given Harleen Smiely RN     02/15/2022 0830 saccharomyces boulardii (FLORASTOR) capsule 250 mg 250 mg Oral Given Kristine De Souza, RN 02/14/2022 1810 saccharomyces boulardii (FLORASTOR) capsule 250 mg 250 mg Oral Given Montana Orellana RN     02/15/2022 0830 sucralfate (CARAFATE) tablet 1 g 1 g Oral Given Tyron Hare, RN     02/14/2022 2023 sucralfate (CARAFATE) tablet 1 g 1 g Oral Given Disha Sidhu RN     02/14/2022 1619 sucralfate (CARAFATE) tablet 1 g 1 g Oral Given Montana Orellana RN     02/15/2022 1048 oxyCODONE (ROXICODONE) immediate release tablet 10 mg 10 mg Oral Given Tyron Hare, RN     02/15/2022 0527 oxyCODONE (ROXICODONE) immediate release tablet 10 mg 10 mg Oral Given Disha Sidhu RN     02/14/2022 2023 oxyCODONE (ROXICODONE) immediate release tablet 10 mg 10 mg Oral Given Disha Sidhu RN     02/14/2022 1619 oxyCODONE (ROXICODONE) immediate release tablet 10 mg 10 mg Oral Given Montana Orellana RN     02/14/2022 2233 montelukast (SINGULAIR) tablet 10 mg 10 mg Oral Given Disha Sidhu RN     02/14/2022 1619 acetaminophen (TYLENOL) tablet 650 mg 650 mg Oral Given Montana Orellana RN     02/15/2022 0524 heparin (porcine) subcutaneous injection 5,000 Units 5,000 Units Subcutaneous Given Disha Sidhu RN     02/14/2022 2237 heparin (porcine) subcutaneous injection 5,000 Units 5,000 Units Subcutaneous Given Disha Sidhu RN     02/14/2022 1339 heparin (porcine) subcutaneous injection 5,000 Units 5,000 Units Subcutaneous Given Montana Orellana RN     02/15/2022 0830 amLODIPine (NORVASC) tablet 5 mg 5 mg Oral Given Tyron Hare, RN     02/15/2022 0830 cholecalciferol (VITAMIN D3) tablet 2,000 Units 2,000 Units Oral Given Tyron Hare, RN     02/15/2022 0832 budesonide-formoterol (SYMBICORT) 160-4 5 mcg/act inhaler 2 puff 2 puff Inhalation Given Ackerman [place], RN     02/14/2022 1812 budesonide-formoterol (SYMBICORT) 160-4 5 mcg/act inhaler 2 puff 2 puff Inhalation Given Montana Orellana RN     02/15/2022 1129 insulin lispro (HumaLOG) 100 units/mL subcutaneous injection 1-5 Units 1 Units Subcutaneous Not Given Tyron Hare RN     02/15/2022 4873 insulin lispro (HumaLOG) 100 units/mL subcutaneous injection 1-5 Units 1 Units Subcutaneous Not Given Fern Soto RN     02/15/2022 0830 insulin glargine (LANTUS) subcutaneous injection 12 Units 0 12 mL 0 Units Subcutaneous Hold Fern Soto RN          Objective:     Vitals: Blood pressure 141/85, pulse 78, temperature 98 2 °F (36 8 °C), resp  rate 17, height 5' 2" (1 575 m), weight 63 5 kg (139 lb 15 9 oz), SpO2 97 %, not currently breastfeeding  ,Body mass index is 25 6 kg/m²      No intake or output data in the 24 hours ending 02/15/22 1250    Physical Exam:   General Appearance: Awake and alert, in no acute distress  Abdomen: Soft, non-tender, non-distended    Invasive Devices  Report    Peripheral Intravenous Line            Peripheral IV 02/13/22 Right Hand 2 days                Lab Results:  Admission on 02/13/2022   Component Date Value    WBC 02/13/2022 11 29*    RBC 02/13/2022 4 89     Hemoglobin 02/13/2022 15 0     Hematocrit 02/13/2022 44 7     MCV 02/13/2022 91     MCH 02/13/2022 30 7     MCHC 02/13/2022 33 6     RDW 02/13/2022 12 6     MPV 02/13/2022 10 7     Platelets 80/88/4005 317     nRBC 02/13/2022 0     Neutrophils Relative 02/13/2022 83*    Immat GRANS % 02/13/2022 0     Lymphocytes Relative 02/13/2022 12*    Monocytes Relative 02/13/2022 5     Eosinophils Relative 02/13/2022 0     Basophils Relative 02/13/2022 0     Neutrophils Absolute 02/13/2022 9 25*    Immature Grans Absolute 02/13/2022 0 04     Lymphocytes Absolute 02/13/2022 1 38     Monocytes Absolute 02/13/2022 0 57     Eosinophils Absolute 02/13/2022 0 02     Basophils Absolute 02/13/2022 0 03     Sodium 02/13/2022 143     Potassium 02/13/2022 4 5     Chloride 02/13/2022 109*    CO2 02/13/2022 19*    ANION GAP 02/13/2022 15*    BUN 02/13/2022 35*    Creatinine 02/13/2022 2 02*    Glucose 02/13/2022 138     Calcium 02/13/2022 9 5     AST 02/13/2022 18     ALT 02/13/2022 28     Alkaline Phosphatase 02/13/2022 118*    Total Protein 02/13/2022 8 4*    Albumin 02/13/2022 4 1     Total Bilirubin 02/13/2022 0 21     eGFR 02/13/2022 27     Lipase 02/13/2022 468*    SARS-CoV-2 02/13/2022 Positive*    INFLUENZA A PCR 02/13/2022 Negative     INFLUENZA B PCR 02/13/2022 Negative     RSV PCR 02/13/2022 Negative     hs TnI 0hr 02/13/2022 5     Protime 02/13/2022 12 2     INR 02/13/2022 0 92     PTT 02/13/2022 37     LACTIC ACID 02/13/2022 4 7*    hs TnI 2hr 02/13/2022 4     Delta 2hr hsTnI 02/13/2022 -1     hs TnI 4hr 02/14/2022 8     Delta 4hr hsTnI 02/14/2022 3     LACTIC ACID 02/13/2022 0 9     Color, UA 02/13/2022 Yellow     Clarity, UA 02/13/2022 Clear     pH, UA 02/13/2022 5 5     Leukocytes, UA 02/13/2022 Negative     Nitrite, UA 02/13/2022 Negative     Protein, UA 02/13/2022 >=300*    Glucose, UA 02/13/2022 Negative     Ketones, UA 02/13/2022 Negative     Urobilinogen, UA 02/13/2022 0 2     Bilirubin, UA 02/13/2022 Negative     Blood, UA 02/13/2022 Small*    Specific [place], UA 02/13/2022 1 025     RBC, UA 02/13/2022 0-1*    WBC, UA 02/13/2022 0-1*    Epithelial Cells 02/13/2022 Occasional     Bacteria, UA 02/13/2022 None Seen     POC Glucose 02/13/2022 125     Ventricular Rate 02/13/2022 56     Atrial Rate 02/13/2022 56     MT Interval 02/13/2022 160     QRSD Interval 02/13/2022 66     QT Interval 02/13/2022 426     QTC Interval 02/13/2022 411     P Axis 02/13/2022 -84     QRS Axis 02/13/2022 27     T Wave Axis 02/13/2022 52     Ventricular Rate 02/13/2022 47     Atrial Rate 02/13/2022 47     MT Interval 02/13/2022 152     QRSD Interval 02/13/2022 60     QT Interval 02/13/2022 470     QTC Interval 02/13/2022 415     P Axis 02/13/2022 213     QRS Axis 02/13/2022 21     T Wave Axis 02/13/2022 47     POC Glucose 02/13/2022 90     POC Glucose 02/14/2022 84     Sodium 02/14/2022 140     Potassium 02/14/2022 3 7     Chloride 02/14/2022 109*    CO2 02/14/2022 21     ANION GAP 02/14/2022 10     BUN 02/14/2022 25     Creatinine 02/14/2022 1 69*    Glucose 02/14/2022 85     Glucose, Fasting 02/14/2022 85     Calcium 02/14/2022 7 8*    eGFR 02/14/2022 34     WBC 02/14/2022 4 37     RBC 02/14/2022 3 61*    Hemoglobin 02/14/2022 10 9*    Hematocrit 02/14/2022 32 6*    MCV 02/14/2022 90     MCH 02/14/2022 30 2     MCHC 02/14/2022 33 4     RDW 02/14/2022 12 8     MPV 02/14/2022 10 1     Platelets 16/64/7476 208     nRBC 02/14/2022 0     Neutrophils Relative 02/14/2022 45     Immat GRANS % 02/14/2022 0     Lymphocytes Relative 02/14/2022 41     Monocytes Relative 02/14/2022 14*    Eosinophils Relative 02/14/2022 0     Basophils Relative 02/14/2022 0     Neutrophils Absolute 02/14/2022 1 95     Immature Grans Absolute 02/14/2022 0 01     Lymphocytes Absolute 02/14/2022 1 77     Monocytes Absolute 02/14/2022 0 63     Eosinophils Absolute 02/14/2022 0 01     Basophils Absolute 02/14/2022 0 00     POC Glucose 02/14/2022 78     POC Glucose 02/14/2022 71     POC Glucose 02/14/2022 102     POC Glucose 02/14/2022 61*    POC Glucose 02/14/2022 104     POC Glucose 02/14/2022 74     WBC 02/15/2022 5 12     RBC 02/15/2022 4 30     Hemoglobin 02/15/2022 12 8     Hematocrit 02/15/2022 39 2     MCV 02/15/2022 91     MCH 02/15/2022 29 8     MCHC 02/15/2022 32 7     RDW 02/15/2022 12 9     MPV 02/15/2022 10 3     Platelets 78/47/5060 238     nRBC 02/15/2022 0     Neutrophils Relative 02/15/2022 40*    Immat GRANS % 02/15/2022 0     Lymphocytes Relative 02/15/2022 50*    Monocytes Relative 02/15/2022 10     Eosinophils Relative 02/15/2022 0     Basophils Relative 02/15/2022 0     Neutrophils Absolute 02/15/2022 2 04     Immature Grans Absolute 02/15/2022 0 01     Lymphocytes Absolute 02/15/2022 2 54     Monocytes Absolute 02/15/2022 0 51     Eosinophils Absolute 02/15/2022 0 01     Basophils Absolute 02/15/2022 0 01     Sodium 02/15/2022 142     Potassium 02/15/2022 3 5     Chloride 02/15/2022 108     CO2 02/15/2022 26     ANION GAP 02/15/2022 8     BUN 02/15/2022 23     Creatinine 02/15/2022 1 71*    Glucose 02/15/2022 63*    Calcium 02/15/2022 8 4     eGFR 02/15/2022 34     POC Glucose 02/15/2022 71     POC Glucose 02/15/2022 82        Imaging Studies: I have personally reviewed pertinent imaging studies

## 2022-02-15 NOTE — DISCHARGE SUMMARY
[address] [facility]  Discharge- Lennox Marcial 1970, 46 y o  female MRN: [id number]  Unit/Bed#: [address] 202-01 Encounter: [id number]  Primary Care Provider: Hugh Alonso PA-C   Date and time admitted to hospital: 2/13/2022  7:09 AM          Admission Date: 2/13/2022       Discharge Date: 2/15/2022        Primary Diagnoses  Principal Problem (Resolved): Intractable abdominal pain  Active Problems:    Gastroparesis    COVID    Type 2 diabetes mellitus with stage 4 chronic kidney disease, with long-term current use of insulin (HCC)    Granulomatosis with polyangiitis (HCC)    Benign essential HTN    Bipolar 1 disorder (HCC)    Neuropathy    Stage 4 chronic kidney disease ([facility] [address] )    Chronic pain      Hospital course by problem:  * Intractable abdominal pain-resolved as of 2/15/2022  Assessment & Plan  · Patient presented to the ED intractable of abdominal pain with nausea and vomiting  · 2/13 CT scan abdomen/pelvis without any acute abnormality to account for her symptoms  · Patient was evaluated by the GI team, who notes a history of chronic gastroparesis and chronic abdominal pain  · Due to elevated lactic acid and her history of Justa's she was evaluated by the surgery team   There was no evidence of ischemia  · Patient was maintained NPO, given IV fluids, analgesics, and antiemetics and markedly improved  Her diet was advanced  · She notes her abdominal pain has completely resolved at this time  She is tolerating a solid diet without any difficulty    · Patient notes she feels back to her baseline and is requesting discharge home  · She relates she has chronic, intermittent abdominal pain secondary to her gastroparesis, with flares several times a month    Megan [address]ustill[address]  Patient is currently unvaccinated  Neftaly Ramirez from 1 of her caregivers:  Was exposed 13 days ago  Patient currently denies any symptoms  No evidence of hypoxia  Outpatient treatment algorithm, patient is in the [facility] category  Will prescribe vitamin D 3 and budesonide inhaler  Continue follow-up with PCP      Gastroparesis  Assessment & Plan  · Patient presented with intractable nausea/vomiting  · She notes this felt identical to her previous flares of her gastroparesis  · She notes she has several episodes a month which she usually manages at home:  She maintains herself NPO, rests in bed, until the episode passes  · Patient follows at [facility] for her refractory gastroparesis  · Status post laparoscopic pyloroplasty with gastric stimulator:  Patient notes marked improvement after the placement of her gastric stimulator  · She follows with [facility] GI team and has an outpatient upper endoscopy planned  · Patient was also evaluated by the surgical team who notes outpatient consideration for subtotal gastrectomy with gastrojejunotomy tube  · Continue Protonix, Carafate  · Patient's previous intractable nausea/vomiting resolved  She is tolerating clear liquids and has been advanced to a regular diet which she is tolerating  She will be discharged home to continue outpatient follow-up with GI    Chronic pain  Assessment & Plan  Patient has a history of chronic pain  Is prescribed opioids p r n  As an outpatient  PA- website was queried x2- however, unable to access transitioned system    Stage 4 chronic kidney disease [facility])  Assessment & Plan  Lab Results   Component Value Date    EGFR 34 02/15/2022    EGFR 34 02/14/2022    EGFR 27 02/13/2022    CREATININE 1 71 (H) 02/15/2022    CREATININE 1 69 (H) 02/14/2022    CREATININE 2 02 (H) 02/13/2022     · History of CKD stage 3-4  · Patient's creatinine appears variable, based on old records, ranging 1 5-2 0  · Patient was given IV fluid hydration  · Current creatinine at her baseline    Continue outpatient follow-up  ·     Neuropathy  Assessment & Plan  Patient has history of peripheral neuropathy  Currently on Lyrica    Bipolar 1 disorder [facility])  Assessment & Plan  · Continue Wellbutrin  · Patient will resume her Adderall and Cymbalta at discharge  · Continue outpatient follow-up with her therapist, psychiatrist, with the assistance of her ICM    Benign essential HTN  Assessment & Plan  · Patient has a history of essential hypertension  · She is not maintained on any medications prior to admission  · While in the hospital, with experiencing pain, and vomiting her blood pressure was accelerated  · She was started on Norvasc 5 mg daily with improved blood pressure control  · Patient's outpatient records were reviewed and at her checkup her systolic was in the 1 teens  Will discontinue her Norvasc at discharge to avoid possible hypotension, now that her abdominal pain and vomiting have resolved  · She will follow-up with her PCP in 1 week for a blood pressure check to determine if she requires continuation on antihypertensive    Granulomatosis with polyangiitis ([facility] [address] )  Assessment & Plan  · History of Justa's granulomatosis  · Continue outpatient follow-up    Type 2 diabetes mellitus with stage 4 chronic kidney disease, with long-term current use of insulin [facility])  Assessment & Plan  Lab Results   Component Value Date    HGBA1C 5 7 09/29/2021       Recent Labs     02/14/22  1609 02/14/22  1639 02/14/22  2049 02/15/22  0747   POCGLU 61* 104 74 71       Blood Sugar Average: Last 72 hrs:  · (P) 86   ·   · Patient has history of diabetes type 2, with long-term use of insulin, with associated neuropathy, and chronic kidney disease stage 3-4  · Maintained on toujeo 22 units q a m at home  · Place on Lantus, sliding scale insulin with Accu-Cheks  · Patient is noted to have hypoglycemia, likely related to her decreased p o  Intake  · Her home regimen was titrated  · Was discussed with patient prior to discharge:  She notes that she is accustomed to titrating her insulin doses based on her p o  Intake and her glucometer reading    She will continue to check her blood sugar frequently throughout the day and adjust her insulin doses      Service:  [facility] Internal Medicine, [facility]  Consulting Providers   Surgery: Dr Sandra Orellana: Dr Carlee Tenorio Studies:  2/13:  CT abdomen/pelvis  No acute CT abnormality to account for the patient's abdominal symptoms      Microbiology  2/13 positive COVID  Negative influenza, RSV    Results from last 7 days   Lab Units 02/15/22  0535 02/14/22  0508 02/13/22  0731   WBC Thousand/uL 5 12 4 37 11 29*   HEMOGLOBIN g/dL 12 8 10 9* 15 0   HEMATOCRIT % 39 2 32 6* 44 7   PLATELETS Thousands/uL 238 208 317     Results from last 7 days   Lab Units 02/15/22  0535 02/14/22  0508 02/13/22  0731   POTASSIUM mmol/L 3 5 3 7 4 5   CHLORIDE mmol/L 108 109* 109*   CO2 mmol/L 26 21 19*   BUN mg/dL 23 25 35*   CREATININE mg/dL 1 71* 1 69* 2 02*   CALCIUM mg/dL 8 4 7 8* 9 5       History and Physical Exam:  Please refer to the Admission H&P note    Discharge Condition: Improved  Discharge Disposition: Home/Self Care    Discharge Note and Physical Exam:   Patient relates she feels back to her baseline and is requesting discharge home as soon as possible  She notes her abdominal pain has completely resolved  She notes her previous vomiting has completely resolved  She is tolerating a solid diet without any difficulties  She denies any nausea or GI upset  She notes she has her chronic low back pain, which is at her baseline  She denies any other pain anywhere else  She denies any shortness of breath or cough  She denies any dizziness or lightheadedness  Vitals:    02/15/22 0744   BP: 126/70   Pulse: 55   Resp: 17   Temp: 98 2 °F (36 8 °C)   SpO2: 97%     General:  Pleasant, non-tachypnic, non-dyspnic  Conversant  Able to speak in complete sentences without having to stop for dyspnea  Heart: Regular rate and rhythm, S1S2 present  No murmur, rub or gallop    Lungs: Clear to auscultation bilaterally, no wheezing, rhonchi, or crackles  Good air movement  No accessory muscle use or respiratory distress  Abdomen: soft, non-tender with palpation, non-distended, NABS  No rebound or guarding  No mass or peritoneal signs  Extremities: no clubbing, cyanosis or edema  2+ pedal pulses bilaterally  Neurologic:  Awake alert  Fluent speech  Strength globally intact  Skin: warm and dry  No petechiae, purpura or rash  Discharge Medications   Please see Medical Reconciliation Discharge Form    Discharge Follow Up Appointments:   [facility]: 1 week  GI: R  Day: 1 week    Discharge  Statement   Total Time Spent today including physical exam, discussion with patient and discharge arrangements/care = 36 minutes      dw pts nurse  vilma   Family:  Had LM with pt's brother- awaiting callback    This note has been constructed using a voice recognition system

## 2022-02-15 NOTE — ASSESSMENT & PLAN NOTE
· Patient has a history of essential hypertension  · She is not maintained on any medications prior to admission  · While in the hospital, with experiencing pain, and vomiting her blood pressure was accelerated  · She was started on Norvasc 5 mg daily with improved blood pressure control  · Patient's outpatient records were reviewed and at her checkup her systolic was in the 1 teens    Will discontinue her Norvasc at discharge to avoid possible hypotension, now that her abdominal pain and vomiting have resolved  · She will follow-up with her PCP in 1 week for a blood pressure check to determine if she requires continuation on antihypertensive

## 2022-02-15 NOTE — ASSESSMENT & PLAN NOTE
· Patient presented to the ED intractable of abdominal pain with nausea and vomiting  · 2/13 CT scan abdomen/pelvis without any acute abnormality to account for her symptoms  · Patient was evaluated by the GI team, who notes a history of chronic gastroparesis and chronic abdominal pain  · Due to elevated lactic acid and her history of Justa's she was evaluated by the surgery team   There was no evidence of ischemia  · Patient was maintained NPO, given IV fluids, analgesics, and antiemetics and markedly improved  Her diet was advanced  · She notes her abdominal pain has completely resolved at this time  She is tolerating a solid diet without any difficulty    · Patient notes she feels back to her baseline and is requesting discharge home  · She relates she has chronic, intermittent abdominal pain secondary to her gastroparesis, with flares several times a month

## 2022-02-15 NOTE — CASE MANAGEMENT
Case Management Discharge Planning Note    Patient name Linda Vieira  Location [facility] [address] [facility] Simon Lewis* MRN [id number]  : 1970 Date 2/15/2022       Current Admission Date: 2022  Current Admission Diagnosis:Intractable abdominal pain   Patient Active Problem List    Diagnosis Date Noted    Chronic pain 2022    COVID 2022    Depression 2022    Continuous opioid dependence ([facility] ) 2022    Chronic kidney disease, stage 4 (severe) ([facility] ) 2022    Cervical disc disorder with radiculopathy of mid-cervical region     Neck pain     Cervical spinal stenosis     Difficulty ventilating with mask 2021    Low back pain radiating to left lower extremity 2021    Lump of skin of back 2021    Marijuana use 2021    Stage 4 chronic kidney disease ([facility] ) 2021    Cervical radiculopathy 2021    Right arm numbness 2021    Fall at home, initial encounter 2021    Sinus bradycardia 2021    Schizo-affective schizophrenia ([facility] ) 10/14/2021    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[facility] ) 10/14/2021    Smoking 2021    Nausea and vomiting 2021    Alpha-hemolytic Streptococcus positive urine culture 2021    Vaginal atrophy 2021    Hearing difficulty of both ears 2021    Costochondritis 2021    Elevated blood pressure reading 11/10/2020    Attention deficit hyperactivity disorder (ADHD) 2020    Contusion of left hip 2019    Trochanteric bursitis of left hip 2019    Hyperosmia 2019    Smell disturbance 2019    Gastroparesis 2019    IBS (irritable bowel syndrome) 2019    Leukocytosis 2019    Small fiber neuropathy 2018    Left leg pain 2018    Controlled substance agreement signed 2018    Chronic narcotic use 2018    Persistent proteinuria 2018    Gastroesophageal reflux disease 2018    Weakness of both upper extremities 07/26/2018    Seasonal allergic rhinitis due to pollen 04/26/2018    Plantar wart, right foot 04/16/2018    Chronic pelvic pain in female 04/13/2018    Diarrhea of presumed infectious origin 03/28/2018    Epigastric pain 01/07/2018    Pancreatitis 01/07/2018    Ovarian cyst 01/07/2018    Postherpetic neuralgia 01/07/2018    Bipolar 1 disorder ([facility] ) 12/21/2017    Intractable abdominal pain 12/21/2017    Chronic right shoulder pain 07/25/2017    Noncompliance 07/25/2017    Cataplexy 12/07/2016    Weakness of both lower extremities 11/29/2016    MPA (microscopic polyangiitis) (HCC)     Asthma     Benign essential HTN     Arthralgia of multiple joints 05/24/2016    Type 2 diabetes mellitus with stage 4 chronic kidney disease, with long-term current use of insulin ([facility] ) 04/06/2016    Dyslipidemia 04/06/2016    Granulomatosis with polyangiitis ([facility] ) 04/06/2016    Anemia of chronic disease 07/01/2015    Neuropathy 07/08/2011      LOS (days): 1  Geometric Mean LOS (GMLOS) (days):   Days to GMLOS:     OBJECTIVE:  Risk of Unplanned Readmission Score: 34         Current admission status: Inpatient   Preferred Pharmacy:   [id number] , [address] [facility] [address] [place] [address]  [address] [place] [id number]  Phone: [phone] Fax: [phone]    Primary Care Provider: Hugh Alonso PA-C    Primary Insurance: [place] MEDICARE [facility] REP  Secondary Insurance: [address] [place] [address] DETAILS:    CM spoke with Pt by telephone to discuss her discharge  Pt states he waiver HHA will provide her transportation home  Pt will need her shoes and coat  Pt will ask her HHA to pick these items up prior to coming to hospital however, Pt is not certain HHA will be able to access Pts apartment  Pt ended this tc when  entered her room  CM will follow up w/ Pt prior to discharge

## 2022-02-15 NOTE — ASSESSMENT & PLAN NOTE
Lab Results   Component Value Date    HGBA1C 5 7 09/29/2021       Recent Labs     02/14/22  1609 02/14/22  1639 02/14/22  2049 02/15/22  0747   POCGLU 61* 104 74 71       Blood Sugar Average: Last 72 hrs:  · (P) 86   ·   · Patient has history of diabetes type 2, with long-term use of insulin, with associated neuropathy, and chronic kidney disease stage 3-4  · Maintained on toujeo 22 units q a m at home  · Place on Lantus, sliding scale insulin with Accu-Cheks  · Patient is noted to have hypoglycemia, likely related to her decreased p o  Intake  · Her home regimen was titrated  · Was discussed with patient prior to discharge:  She notes that she is accustomed to titrating her insulin doses based on her p o  Intake and her glucometer reading    She will continue to check her blood sugar frequently throughout the day and adjust her insulin doses

## 2022-02-15 NOTE — ASSESSMENT & PLAN NOTE
Patient has a history of chronic pain  Is prescribed opioids p r n   As an outpatient  PA- website was queried x2- however, unable to access transitioned system

## 2022-02-15 NOTE — ASSESSMENT & PLAN NOTE
Patient is currently unvaccinated  Contacted COVID from 1 of her caregivers:  Was exposed 13 days ago  Patient currently denies any symptoms  No evidence of hypoxia  Outpatient treatment algorithm, patient is in the [facility] category    Will prescribe vitamin D 3 and budesonide inhaler  Continue follow-up with PCP

## 2022-02-15 NOTE — DISCHARGE INSTRUCTIONS

## 2022-02-15 NOTE — ASSESSMENT & PLAN NOTE
Lab Results   Component Value Date    EGFR 34 02/15/2022    EGFR 34 02/14/2022    EGFR 27 02/13/2022    CREATININE 1 71 (H) 02/15/2022    CREATININE 1 69 (H) 02/14/2022    CREATININE 2 02 (H) 02/13/2022     · History of CKD stage 3-4  · Patient's creatinine appears variable, based on old records, ranging 1 5-2 0  · Patient was given IV fluid hydration  · Current creatinine at her baseline    Continue outpatient follow-up  ·

## 2022-02-15 NOTE — ASSESSMENT & PLAN NOTE
· Patient presented with intractable nausea/vomiting  · She notes this felt identical to her previous flares of her gastroparesis  · She notes she has several episodes a month which she usually manages at home:  She maintains herself NPO, rests in bed, until the episode passes  · Patient follows at [facility] for her refractory gastroparesis  · Status post laparoscopic pyloroplasty with gastric stimulator:  Patient notes marked improvement after the placement of her gastric stimulator  · She follows with [facility] and has an outpatient upper endoscopy planned  · Patient was also evaluated by the surgical team who notes outpatient consideration for subtotal gastrectomy with gastrojejunotomy tube  · Continue Protonix, Carafate  · Patient's previous intractable nausea/vomiting resolved  She is tolerating clear liquids and has been advanced to a regular diet which she is tolerating    She will be discharged home to continue outpatient follow-up with GI

## 2022-02-15 NOTE — PLAN OF CARE
Problem: NEUROSENSORY - ADULT  Goal: Achieves maximal functionality and self care  Description: INTERVENTIONS  - Monitor swallowing and airway patency with patient fatigue and changes in neurological status  - Encourage and assist patient to increase activity and self care     - Encourage visually impaired, hearing impaired and aphasic patients to use assistive/communication devices  Outcome: Progressing     Problem: GASTROINTESTINAL - ADULT  Goal: Maintains or returns to baseline bowel function  Description: INTERVENTIONS:  - Assess bowel function  - Encourage oral fluids to ensure adequate hydration  - Administer IV fluids if ordered to ensure adequate hydration  - Administer ordered medications as needed  - Encourage mobilization and activity  - Consider nutritional services referral to assist patient with adequate nutrition and appropriate food choices  Outcome: Progressing  Goal: Maintains adequate nutritional intake  Description: INTERVENTIONS:  - Monitor percentage of each meal consumed  - Identify factors contributing to decreased intake, treat as appropriate  - Assist with meals as needed  - Monitor I&O, weight, and lab values if indicated  - Obtain nutrition services referral as needed  Outcome: Progressing     Problem: METABOLIC, FLUID AND ELECTROLYTES - ADULT  Goal: Electrolytes maintained within normal limits  Description: INTERVENTIONS:  - Monitor labs and assess patient for signs and symptoms of electrolyte imbalances  - Administer electrolyte replacement as ordered  - Monitor response to electrolyte replacements, including repeat lab results as appropriate  - Instruct patient on fluid and nutrition as appropriate  Outcome: Progressing  Goal: Fluid balance maintained  Description: INTERVENTIONS:  - Monitor labs   - Monitor I/O and WT  - Instruct patient on fluid and nutrition as appropriate  - Assess for signs & symptoms of volume excess or deficit  Outcome: Progressing  Goal: Glucose maintained within target range  Description: INTERVENTIONS:  - Monitor Blood Glucose as ordered  - Assess for signs and symptoms of hyperglycemia and hypoglycemia  - Administer ordered medications to maintain glucose within target range  - Assess nutritional intake and initiate nutrition service referral as needed  Outcome: Progressing     Problem: PAIN - ADULT  Goal: Verbalizes/displays adequate comfort level or baseline comfort level  Description: Interventions:  - Encourage patient to monitor pain and request assistance  - Assess pain using appropriate pain scale  - Administer analgesics based on type and severity of pain and evaluate response  - Implement non-pharmacological measures as appropriate and evaluate response  - Consider cultural and social influences on pain and pain management  - Notify physician/advanced practitioner if interventions unsuccessful or patient reports new pain  Outcome: Progressing     Problem: SAFETY ADULT  Goal: Patient will remain free of falls  Description: INTERVENTIONS:  - Educate patient/family on patient safety including physical limitations  - Instruct patient to call for assistance with activity   - Consult OT/PT to assist with strengthening/mobility   - Keep Call bell within reach  - Keep bed low and locked with side rails adjusted as appropriate  - Keep care items and personal belongings within reach  - Initiate and maintain comfort rounds  - Make Fall Risk Sign visible to staff  - Offer Toileting every 4 Hours, in advance of need  - Obtain necessary fall risk management equipment: socks  - Apply yellow socks and bracelet for high fall risk patients  - Consider moving patient to room near nurses station  Outcome: Progressing  Goal: Maintain or return to baseline ADL function  Description: INTERVENTIONS:  -  Assess patient's ability to carry out ADLs; assess patient's baseline for ADL function and identify physical deficits which impact ability to perform ADLs (bathing, care of mouth/teeth, toileting, grooming, dressing, etc )  - Assess/evaluate cause of self-care deficits   - Assess range of motion  - Assess patient's mobility; develop plan if impaired  - Assess patient's need for assistive devices and provide as appropriate  - Encourage maximum independence but intervene and supervise when necessary  - Involve family in performance of ADLs  - Assess for home care needs following discharge   - Consider OT consult to assist with ADL evaluation and planning for discharge  - Provide patient education as appropriate  Outcome: Progressing  Goal: Maintains/Returns to pre admission functional level  Description: INTERVENTIONS:  - Perform BMAT or MOVE assessment daily    - Set and communicate daily mobility goal to care team and patient/family/caregiver     - Collaborate with rehabilitation services on mobility goals if consulted  - Out of bed to chair 3 times a day   - Out of bed for meals 3 times a day  - Out of bed for toileting  - Record patient progress and toleration of activity level   Outcome: Progressing     Problem: DISCHARGE PLANNING  Goal: Discharge to home or other facility with appropriate resources  Description: INTERVENTIONS:  - Identify barriers to discharge w/patient and caregiver  - Arrange for needed discharge resources and transportation as appropriate  - Identify discharge learning needs (meds, wound care, etc )  - Arrange for interpretive services to assist at discharge as needed  - Refer to Case Management Department for coordinating discharge planning if the patient needs post-hospital services based on physician/advanced practitioner order or complex needs related to functional status, cognitive ability, or social support system  Outcome: Progressing     Problem: Knowledge Deficit  Goal: Patient/family/caregiver demonstrates understanding of disease process, treatment plan, medications, and discharge instructions  Description: Complete learning assessment and assess knowledge base  Interventions:  - Provide teaching at level of understanding  - Provide teaching via preferred learning methods  Outcome: Progressing     Problem: MOBILITY - ADULT  Goal: Maintain or return to baseline ADL function  Description: INTERVENTIONS:  -  Assess patient's ability to carry out ADLs; assess patient's baseline for ADL function and identify physical deficits which impact ability to perform ADLs (bathing, care of mouth/teeth, toileting, grooming, dressing, etc )  - Assess/evaluate cause of self-care deficits   - Assess range of motion  - Assess patient's mobility; develop plan if impaired  - Assess patient's need for assistive devices and provide as appropriate  - Encourage maximum independence but intervene and supervise when necessary  - Involve family in performance of ADLs  - Assess for home care needs following discharge   - Consider OT consult to assist with ADL evaluation and planning for discharge  - Provide patient education as appropriate  Outcome: Progressing  Goal: Maintains/Returns to pre admission functional level  Description: INTERVENTIONS:  - Perform BMAT or MOVE assessment daily    - Set and communicate daily mobility goal to care team and patient/family/caregiver     - Collaborate with rehabilitation services on mobility goals if consulted  - Out of bed for toileting  - Record patient progress and toleration of activity level   Outcome: Progressing     Problem: Potential for Falls  Goal: Patient will remain free of falls  Description: INTERVENTIONS:  - Educate patient/family on patient safety including physical limitations  - Instruct patient to call for assistance with activity   - Consult OT/PT to assist with strengthening/mobility   - Keep Call bell within reach  - Keep bed low and locked with side rails adjusted as appropriate  - Keep care items and personal belongings within reach  - Initiate and maintain comfort rounds  - Make Fall Risk Sign visible to staff  - Apply yellow socks and bracelet for high fall risk patients  - Consider moving patient to room near nurses station  Outcome: Progressing

## 2022-02-15 NOTE — NURSING NOTE
Nurse called to patient room  Patient was found on floor and assisted back to bed by staff member  Patient assessed  Vitals stable  Patient stating she did not fall  Stated she felt her legs getting weak while walking to get clothes in room and lowered herself down to her R knee then pressed her call bell for assistance  No bruising or new pain noted  MD made aware and in room to assess patient

## 2022-02-16 ENCOUNTER — TRANSITIONAL CARE MANAGEMENT (OUTPATIENT)
Dept: FAMILY MEDICINE CLINIC | Facility: CLINIC | Age: 52
End: 2022-02-16

## 2022-02-20 DIAGNOSIS — F31.9 BIPOLAR 1 DISORDER (HCC): ICD-10-CM

## 2022-02-21 DIAGNOSIS — G89.4 CHRONIC PAIN SYNDROME: ICD-10-CM

## 2022-02-21 DIAGNOSIS — M31.31 GRANULOMATOSIS WITH POLYANGIITIS WITH RENAL INVOLVEMENT (HCC): Chronic | ICD-10-CM

## 2022-02-21 RX ORDER — OXYCODONE HYDROCHLORIDE 10 MG/1
10 TABLET ORAL EVERY 4 HOURS PRN
Qty: 180 TABLET | Refills: 0 | Status: SHIPPED | OUTPATIENT
Start: 2022-02-21 | End: 2022-03-17 | Stop reason: SDUPTHER

## 2022-02-21 RX ORDER — DEXTROAMPHETAMINE SACCHARATE, AMPHETAMINE ASPARTATE MONOHYDRATE, DEXTROAMPHETAMINE SULFATE AND AMPHETAMINE SULFATE 5; 5; 5; 5 MG/1; MG/1; MG/1; MG/1
20 CAPSULE, EXTENDED RELEASE ORAL 2 TIMES DAILY
Qty: 60 CAPSULE | Refills: 0 | Status: SHIPPED | OUTPATIENT
Start: 2022-02-21 | End: 2022-03-17 | Stop reason: SDUPTHER

## 2022-02-28 ENCOUNTER — OFFICE VISIT (OUTPATIENT)
Dept: FAMILY MEDICINE CLINIC | Facility: CLINIC | Age: 52
End: 2022-02-28

## 2022-02-28 VITALS
HEIGHT: 62 IN | RESPIRATION RATE: 18 BRPM | SYSTOLIC BLOOD PRESSURE: 102 MMHG | TEMPERATURE: 97.5 F | OXYGEN SATURATION: 99 % | BODY MASS INDEX: 25.69 KG/M2 | WEIGHT: 139.6 LBS | HEART RATE: 59 BPM | DIASTOLIC BLOOD PRESSURE: 64 MMHG

## 2022-02-28 DIAGNOSIS — E11.22 TYPE 2 DIABETES MELLITUS WITH STAGE 4 CHRONIC KIDNEY DISEASE, WITH LONG-TERM CURRENT USE OF INSULIN (HCC): ICD-10-CM

## 2022-02-28 DIAGNOSIS — G62.9 NEUROPATHY: ICD-10-CM

## 2022-02-28 DIAGNOSIS — E11.9 TYPE 2 DIABETES MELLITUS WITHOUT COMPLICATION, WITH LONG-TERM CURRENT USE OF INSULIN (HCC): ICD-10-CM

## 2022-02-28 DIAGNOSIS — Z79.4 TYPE 2 DIABETES MELLITUS WITHOUT COMPLICATION, WITH LONG-TERM CURRENT USE OF INSULIN (HCC): ICD-10-CM

## 2022-02-28 DIAGNOSIS — U07.1 COVID: ICD-10-CM

## 2022-02-28 DIAGNOSIS — N18.4 STAGE 4 CHRONIC KIDNEY DISEASE (HCC): ICD-10-CM

## 2022-02-28 DIAGNOSIS — G89.4 CHRONIC PAIN SYNDROME: ICD-10-CM

## 2022-02-28 DIAGNOSIS — Z79.4 TYPE 2 DIABETES MELLITUS WITH STAGE 4 CHRONIC KIDNEY DISEASE, WITH LONG-TERM CURRENT USE OF INSULIN (HCC): ICD-10-CM

## 2022-02-28 DIAGNOSIS — Z09 HOSPITAL DISCHARGE FOLLOW-UP: Primary | ICD-10-CM

## 2022-02-28 DIAGNOSIS — G44.209 ACUTE NON INTRACTABLE TENSION-TYPE HEADACHE: ICD-10-CM

## 2022-02-28 DIAGNOSIS — K31.84 GASTROPARESIS: ICD-10-CM

## 2022-02-28 DIAGNOSIS — N18.4 TYPE 2 DIABETES MELLITUS WITH STAGE 4 CHRONIC KIDNEY DISEASE, WITH LONG-TERM CURRENT USE OF INSULIN (HCC): ICD-10-CM

## 2022-02-28 LAB — SL AMB POCT HEMOGLOBIN AIC: 5.7 (ref ?–6.5)

## 2022-02-28 PROCEDURE — 83036 HEMOGLOBIN GLYCOSYLATED A1C: CPT | Performed by: PHYSICIAN ASSISTANT

## 2022-02-28 PROCEDURE — 99495 TRANSJ CARE MGMT MOD F2F 14D: CPT | Performed by: PHYSICIAN ASSISTANT

## 2022-02-28 RX ORDER — ACETAMINOPHEN 500 MG
1000 TABLET ORAL EVERY 8 HOURS PRN
Qty: 60 TABLET | Refills: 1 | Status: SHIPPED | OUTPATIENT
Start: 2022-02-28

## 2022-02-28 NOTE — PROGRESS NOTES
Transition of Care  Follow-up After Hospitalization    Mayra Negro 46 y o  female   Date:  2/28/2022    TCM Call (since 1/28/2022)     Date and time call was made  2/16/2022 10:51 AM    Hospital care reviewed  Records reviewed        Patient was hospitialized at  [place] [id number]        Date of Admission  02/13/22    Date of discharge  02/15/22    Diagnosis  Covid-19    Disposition  Home    Were the patients medications reviewed and updated  Yes    Current Symptoms  None      TCM Call (since 1/28/2022)     Post hospital issues  None    Should patient be enrolled in anticoag monitoring? No    Scheduled for follow up? Yes    Did you obtain your prescribed medications  Yes    Do you need help managing your prescriptions or medications  No    Is transportation to your appointment needed  No    I have advised the patient to call PCP with any new or worsening symptoms  [facility] records were reviewed  Medications upon discharge reviewed/updated  Discharge Disposition: Home with Home Health   Follow up visits with other specialists: Gastroenterology      HPI:  - Patient is a 55-year-old female with known PHM of gastroparesis, GERD, constipation, ADHD, type 2 diabetes mellitus, cataplexy, Wegener's granulomatosis, small fiber neuropathy, bipolar 1 disorder who presents today in office for transition of care visit      - Patient was hospitalized at [address] [place] [address] from 02/13/2022 - 02/15/2022 due to COVID-19 with abdominal pain  CT scan abdomen pelvis did not reveal any acute abnormality to account for patient's symptoms  Patient was evaluated by the GI team   Patient does have history of chronic gastroparesis and chronic abdominal pain  Patient was evaluated by surgery team and there was no evidence of ischemia  Patient was maintained NPO, given IV fluids, analgesics, and antiemetics and symptoms markedly improved    Diet was advanced and upon discharge, patient was tolerating a solid diet without any difficulty  - Today, patient notes her stomach pain has been stable  Patient notes she has been taking the Protonix and Carafate which have been helpful  Patient notes she is no longer feeling that extreme burning sensation every time she is eating     - Today, patient notes overall she has been doing okay, but she has been experiencing headaches mostly every day since she was diagnosed with COVID-19  Patient notes the headaches vary in location throughout her head  Patient notes that come and go throughout the day and experiences associated photosensitivity  Patient denies any associated nausea, vomiting, dizziness  Patient notes the oxycodone does not help with the headaches  - Of note, patient notes she has yet to try Lyrica because she was afraid she may have side effects since she had side effects when taking Cymbalta  - Patient notes she did go to an allergist and had allergy testing completed  Patient notes she did not have many allergies and was told her symptoms are due to her gastritis rather than food allergies  Patient notes she was going to have more blood work completed to check for additional allergies, but she has yet to do so  ROS: Review of Systems   Constitutional: Negative for chills and fever  HENT: Negative for congestion and sore throat  Respiratory: Negative for cough, chest tightness and shortness of breath  Cardiovascular: Negative for chest pain and leg swelling  Gastrointestinal: Positive for abdominal pain  Negative for constipation, diarrhea and vomiting  Acid reflux (improving)   Genitourinary: Negative for dysuria  Musculoskeletal: Positive for arthralgias, back pain, myalgias and neck pain  Neurological: Positive for numbness and headaches  Negative for dizziness  Psychiatric/Behavioral: Negative for behavioral problems, self-injury and suicidal ideas         Past Medical History:   Diagnosis Date    ADHD     Anemia of chronic disease     Anxiety     Asthma     Borderline personality disorder (HCC)     Cataplexy     Chronic abdominal pain     CKD (chronic kidney disease) stage 3, GFR 30-59 ml/min (HCC)     Cushing syndrome (HCC)     Diabetes mellitus ([facility] )     DVT (deep venous thrombosis) (HCC)     GERD (gastroesophageal reflux disease)     History of acute pancreatitis     felt secondary to Bactrim    History of transfusion     Hypertension     Liver disease     fatty liver    Microscopic polyangiitis (HCC)     Ovarian cyst     PTSD (post-traumatic stress disorder)     Self-inflicted injury     self inflicted skin wounds    Wegener's granulomatosis with renal involvement ([facility] ) 2015       Past Surgical History:   Procedure Laterality Date    ESOPHAGOGASTRODUODENOSCOPY  09/11/2015    mild antral gastritis    GASTRIC STIMULATOR IMPLANT SURGERY  06/25/2020    MT COLONOSCOPY FLX DX W/COLLJ SPEC WHEN PFRMD N/A 12/14/2018    adenoma removed from the transverse, hyperplastic polyp removed from the left colon    MT ESOPHAGOGASTRODUODENOSCOPY TRANSORAL DIAGNOSTIC N/A 12/14/2018    gastritis and scant coffee-ground material   Biopsies negative for H  pylori    RELEASE SCAR CONTRACTURE / GRAFT REPAIRS OF HAND Bilateral     UPPER GASTROINTESTINAL ENDOSCOPY  12/26/2019    Dr Edmonds Fearing   Botox to the pylorus       Social History     Socioeconomic History    Marital status: Single     Spouse name: None    Number of children: None    Years of education: None    Highest education level: None   Occupational History    Occupation: disability   Tobacco Use    Smoking status: Never Smoker    Smokeless tobacco: Never Used   Vaping Use    Vaping Use: Never used   Substance and Sexual Activity    Alcohol use: Never    Drug use: Yes     Types: Marijuana     Comment: marijuana daily    Sexual activity: Not Currently     Partners: Female, Male     Birth control/protection: None   Other Topics Concern    None   Social History Narrative    Social hx reviewed     No pref on Buddhist beliefs     Daily caffeine consumption 1 serving/day     Social Determinants of Health     Financial Resource Strain: Low Risk     Difficulty of Paying Living Expenses: Not very hard   Food Insecurity: No Food Insecurity    Worried About Running Out of Food in the Last Year: Never true    Soto of Food in the Last Year: Never true   Transportation Needs: No Transportation Needs    Lack of Transportation (Medical): No    Lack of Transportation (Non-Medical):  No   Physical Activity: Not on file   Stress: Not on file   Social Connections: Not on file   Intimate Partner Violence: Not on file   Housing Stability: Not on file       Family History   Problem Relation Age of Onset    No Known Problems Mother     No Known Problems Father     Colon cancer Neg Hx     Drug abuse Neg Hx         mother father    Mental illness Neg Hx         disorder, mother father    Cancer Neg Hx     Breast cancer Neg Hx        Allergies   Allergen Reactions    Prozac [Fluoxetine Hcl]      SI    Bactrim [Sulfamethoxazole-Trimethoprim]      Pt "They think that is what cause the pancreatitis"     Flagyl [Metronidazole] Diarrhea and Abdominal Pain    Lamictal [Lamotrigine] GI Intolerance    Lithium Other (See Comments)    Haldol [Haloperidol] Other (See Comments)     "I don't like it"    Ibuprofen     Lexapro [Escitalopram Oxalate] Rash    Navane [Thiothixene]      SI    Other      "novaine?" antipsychotic         Current Outpatient Medications:     acetaminophen (TYLENOL) 500 mg tablet, Take 2 tablets (1,000 mg total) by mouth every 8 (eight) hours as needed for mild pain, Disp: 60 tablet, Rfl: 1    Alcohol Swabs (Alcohol Pads) 70 % PADS, Use 4 (four) times a day, Disp: 400 each, Rfl: 2    amphetamine-dextroamphetamine (ADDERALL XR) 20 MG 24 hr capsule, Take 1 capsule (20 mg total) by mouth 2 (two) times a day Max Daily Amount: 40 mg, Disp: 60 capsule, Rfl: 0   Blood Glucose Monitoring Suppl (FreeStyle Lite) DEIRDRE, Use daily, Disp: 1 each, Rfl: 0    buPROPion (WELLBUTRIN XL) 300 mg 24 hr tablet, Take 1 tablet (300 mg total) by mouth every morning, Disp: 30 tablet, Rfl: 2    cholecalciferol (VITAMIN D3) 1,000 units tablet, Take 2 tablets (2,000 Units total) by mouth daily for 20 days, Disp: 40 tablet, Rfl: 0    clobetasol (TEMOVATE) 0 05 % cream, Apply topically 2 (two) times a day, Disp: 60 g, Rfl: 2    Easy Comfort Lancets MISC, USE TO TEST THE BLOOD SUGAR THREE TIMES A DAY, Disp: 300 each, Rfl: 10    glucose blood (FREESTYLE LITE) test strip, Use as instructed, Disp: 100 strip, Rfl: 0    hydrOXYzine HCL (ATARAX) 25 mg tablet, , Disp: , Rfl:     insulin aspart (NovoLOG FlexPen) 100 UNIT/ML injection pen, Inject 6 Units under the skin 3 (three) times a day with meals, Disp: 15 mL, Rfl: 2    insulin glargine (Toujeo Max SoloStar) 300 units/mL CONCENTRATED U-300 injection pen (2-unit dial), Inject 22 Units under the skin daily, Disp: 9 mL, Rfl: 2    Insulin Pen Needle (PEN NEEDLES) 31G X 8 MM MISC, Inject 1 Stick under the skin 4 (four) times a day (with meals and at bedtime), Disp: 100 each, Rfl: 6    lidocaine (XYLOCAINE) 5 % ointment, APPLY TOPICALLY 2GM TWICE A DAY TO AFFECTED AREAS AS NEEDED FOR MILD PAIN, Disp: 250 g, Rfl: 8    Lidocaine Viscous HCl (XYLOCAINE) 2 % mucosal solution, Swish and spit 15 mL 4 (four) times a day as needed for mouth pain or discomfort, Disp: 15 mL, Rfl: 1    Linzess 72 MCG CAPS, TAKE ONE CAPSULE BY MOUTH ONCE DAILY, Disp: 90 capsule, Rfl: 10    montelukast (SINGULAIR) 10 mg tablet, TAKE ONE TABLET BY MOUTH ONCE DAILY AT BEDTIME, Disp: 90 tablet, Rfl: 0    ondansetron (ZOFRAN) 4 mg tablet, Take 1 tablet (4 mg total) by mouth every 8 (eight) hours as needed for nausea or vomiting, Disp: 30 tablet, Rfl: 0    oxyCODONE (ROXICODONE) 10 MG TABS, Take 1 tablet (10 mg total) by mouth every 4 (four) hours as needed for moderate pain Max Daily Amount: 60 mg, Disp: 180 tablet, Rfl: 0    pantoprazole (PROTONIX) 40 mg tablet, Take 1 tablet (40 mg total) by mouth 2 (two) times a day before meals, Disp: 180 tablet, Rfl: 1    pregabalin (LYRICA) 50 mg capsule, Take 1 capsule (50 mg total) by mouth 2 (two) times a day, Disp: 60 capsule, Rfl: 1    Probiotic Product (PROBIOTIC-10 PO), Take 1 tablet by mouth daily, Disp: , Rfl:     promethazine (PHENERGAN) 25 mg tablet, Take 1 tablet (25 mg total) by mouth every 6 (six) hours as needed for nausea or vomiting (Patient not taking: Reported on 12/23/2021 ), Disp: 60 tablet, Rfl: 3    Restasis 0 05 % ophthalmic emulsion, , Disp: , Rfl:     scopolamine (TRANSDERM-SCOP) 1 5 mg/3 days TD 72 hr patch, Place 1 patch on the skin every third day, Disp: 10 patch, Rfl: 0    sucralfate (CARAFATE) 1 g/10 mL suspension, TAKE 10 ML BY MOUTH THREE TIMES A DAY, Disp: 500 mL, Rfl: 0      Physical Exam:  /64 (BP Location: Right arm, Patient Position: Sitting, Cuff Size: Standard)   Pulse 59   Temp 97 5 °F (36 4 °C) (Temporal)   Resp 18   Ht 5' 2" (1 575 m)   Wt 63 3 kg (139 lb 9 6 oz)   LMP  (LMP Unknown)   SpO2 99%   BMI 25 53 kg/m²     Physical Exam  Vitals and nursing note reviewed  Constitutional:       General: She is not in acute distress  Appearance: She is well-developed  Comments: Examined in motorized wheelchair   HENT:      Head: Normocephalic and atraumatic  Right Ear: External ear normal       Left Ear: External ear normal       Nose: Nose normal    Eyes:      Conjunctiva/sclera: Conjunctivae normal    Cardiovascular:      Rate and Rhythm: Normal rate and regular rhythm  Pulses: Normal pulses  Heart sounds: Normal heart sounds  Pulmonary:      Effort: Pulmonary effort is normal  No respiratory distress  Breath sounds: Normal breath sounds  No wheezing  Musculoskeletal:      Cervical back: Normal range of motion and neck supple     Skin:     General: Skin is warm and dry  Neurological:      Mental Status: She is alert and oriented to person, place, and time  Psychiatric:         Behavior: Behavior normal              Labs:  Lab Results   Component Value Date    WBC 5 12 02/15/2022    HGB 12 8 02/15/2022    HCT 39 2 02/15/2022    MCV 91 02/15/2022     02/15/2022     Lab Results   Component Value Date     11/09/2017    K 3 5 02/15/2022     02/15/2022    CO2 26 02/15/2022    ANIONGAP 10 10/28/2015    BUN 23 02/15/2022    CREATININE 1 71 (H) 02/15/2022    GLUCOSE 103 08/01/2016    GLUF 85 02/14/2022    CALCIUM 8 4 02/15/2022    CORRECTEDCA 9 3 11/11/2020    AST 18 02/13/2022    ALT 28 02/13/2022    ALKPHOS 118 (H) 02/13/2022    PROT 5 8 (L) 10/28/2015    BILITOT 0 14 (L) 10/28/2015    EGFR 34 02/15/2022       Assessment and Plan:    Hospital discharge follow-up/abdominal pain/gastroparesis  - Symptoms currently stable  - Continue Protonix, Carafate   - Continue follow-up with [facility] gastroenterology as scheduled  - Continue follow-up with [facility] Gastroenterology  - Patient is scheduled for outpatient upper endoscopy next month  - Patient was evaluated by surgical team during recent hospitalization who notes outpatient consideration for subtotal gastrectomy with gastrojejunostomy tube  COVID-19  - Tested positive on 02/13/2022  - Symptoms have mostly resolved other than daily headaches  - Will prescribe Tylenol 1000 mg, to be taken every 8 hours as needed  Stage 4 chronic kidney disease  - Stable  Continue follow-up with nephrology as scheduled  Type 2 diabetes mellitus with stage 4 chronic kidney disease, with long-term current use of insulin ([facility] )  - Patient has history of type 2 diabetes with long-term use of insulin with associated neuropathy and chronic kidney disease stage 3-4   - POCT hemoglobin A1c is 5 7    This has stayed the same since September 2021    - Continue Toujeo 22 units daily and NovoLog 6 units, 3 times daily with meals   - Continue checking daily blood sugars  Lab Results   Component Value Date    HGBA1C 5 7 02/28/2022    HGBA1C 5 7 09/29/2021    HGBA1C 6 0 02/15/2021       Neuropathy  - Patient had previously tried gabapentin, but it caused restless legs syndrome  Patient recently tried Cymbalta, but it caused side effect of dizziness, abdominal pain, vomiting   - Patient is now prescribed Lyrica 50 mg twice daily  However, patient has yet to start taking his medication due to fear of potential side effects   - Advised patient to start taking at her earliest convenience and advised to notify the office if she does experience any side effects  Chronic pain  - Stable  Continue oxycodone 10 mg, every 4 hours as needed  - PDMP reviewed  No red flags noted  UDS up-to-date  Lisa Daniel was seen today for transition of care management  Diagnoses and all orders for this visit:    Hospital discharge follow-up    COVID    Type 2 diabetes mellitus without complication, with long-term current use of insulin ([facility])  -     POCT hemoglobin A1c    Acute non intractable tension-type headache  -     acetaminophen (TYLENOL) 500 mg tablet;  Take 2 tablets (1,000 mg total) by mouth every 8 (eight) hours as needed for mild pain    Chronic pain syndrome    Stage 4 chronic kidney disease (HCC)    Type 2 diabetes mellitus with stage 4 chronic kidney disease, with long-term current use of insulin ([facility])    Gastroparesis    Neuropathy

## 2022-03-01 ENCOUNTER — TELEPHONE (OUTPATIENT)
Dept: FAMILY MEDICINE CLINIC | Facility: CLINIC | Age: 52
End: 2022-03-01

## 2022-03-01 DIAGNOSIS — N18.30 TYPE 2 DIABETES MELLITUS WITH STAGE 3 CHRONIC KIDNEY DISEASE, WITH LONG-TERM CURRENT USE OF INSULIN, UNSPECIFIED WHETHER STAGE 3A OR 3B CKD (HCC): ICD-10-CM

## 2022-03-01 DIAGNOSIS — Z79.4 TYPE 2 DIABETES MELLITUS WITH STAGE 3 CHRONIC KIDNEY DISEASE, WITH LONG-TERM CURRENT USE OF INSULIN, UNSPECIFIED WHETHER STAGE 3A OR 3B CKD (HCC): ICD-10-CM

## 2022-03-01 DIAGNOSIS — E11.22 TYPE 2 DIABETES MELLITUS WITH STAGE 3 CHRONIC KIDNEY DISEASE, WITH LONG-TERM CURRENT USE OF INSULIN, UNSPECIFIED WHETHER STAGE 3A OR 3B CKD (HCC): ICD-10-CM

## 2022-03-01 RX ORDER — PEN NEEDLE, DIABETIC 30 GX3/16"
NEEDLE, DISPOSABLE MISCELLANEOUS
Qty: 100 EACH | Refills: 11 | Status: SHIPPED | OUTPATIENT
Start: 2022-03-01

## 2022-03-01 NOTE — ASSESSMENT & PLAN NOTE
- Patient has history of type 2 diabetes with long-term use of insulin with associated neuropathy and chronic kidney disease stage 3-4   - POCT hemoglobin A1c is 5 7  This has stayed the same since September 2021    - Continue Toujeo 22 units daily and NovoLog 6 units, 3 times daily with meals   - Continue checking daily blood sugars    Lab Results   Component Value Date    HGBA1C 5 7 02/28/2022    HGBA1C 5 7 09/29/2021    HGBA1C 6 0 02/15/2021

## 2022-03-01 NOTE — ASSESSMENT & PLAN NOTE
- Stable  Continue oxycodone 10 mg, every 4 hours as needed  - PDMP reviewed  No red flags noted  UDS up-to-date

## 2022-03-01 NOTE — ASSESSMENT & PLAN NOTE
- Patient had previously tried gabapentin, but it caused restless legs syndrome  Patient recently tried Cymbalta, but it caused side effect of dizziness, abdominal pain, vomiting   - Patient is now prescribed Lyrica 50 mg twice daily  However, patient has yet to start taking his medication due to fear of potential side effects   - Advised patient to start taking at her earliest convenience and advised to notify the office if she does experience any side effects

## 2022-03-01 NOTE — ASSESSMENT & PLAN NOTE
Lab Results   Component Value Date    EGFR 34 02/15/2022    EGFR 34 02/14/2022    EGFR 27 02/13/2022    CREATININE 1 71 (H) 02/15/2022    CREATININE 1 69 (H) 02/14/2022    CREATININE 2 02 (H) 02/13/2022

## 2022-03-07 DIAGNOSIS — Z79.4 TYPE 2 DIABETES MELLITUS WITHOUT COMPLICATION, WITH LONG-TERM CURRENT USE OF INSULIN (HCC): ICD-10-CM

## 2022-03-07 DIAGNOSIS — E11.9 TYPE 2 DIABETES MELLITUS WITHOUT COMPLICATION, WITH LONG-TERM CURRENT USE OF INSULIN (HCC): ICD-10-CM

## 2022-03-07 RX ORDER — INSULIN GLARGINE 300 U/ML
22 INJECTION, SOLUTION SUBCUTANEOUS DAILY
Qty: 9 ML | Refills: 0 | Status: SHIPPED | OUTPATIENT
Start: 2022-03-07 | End: 2022-08-10 | Stop reason: SDUPTHER

## 2022-03-07 RX ORDER — INSULIN ASPART 100 [IU]/ML
6 INJECTION, SOLUTION INTRAVENOUS; SUBCUTANEOUS
Qty: 15 ML | Refills: 0 | Status: SHIPPED | OUTPATIENT
Start: 2022-03-07

## 2022-03-08 ENCOUNTER — OFFICE VISIT (OUTPATIENT)
Dept: PAIN MEDICINE | Facility: CLINIC | Age: 52
End: 2022-03-08
Payer: MEDICARE

## 2022-03-08 VITALS
HEART RATE: 88 BPM | HEIGHT: 62 IN | DIASTOLIC BLOOD PRESSURE: 60 MMHG | BODY MASS INDEX: 25.53 KG/M2 | SYSTOLIC BLOOD PRESSURE: 118 MMHG | TEMPERATURE: 98.2 F

## 2022-03-08 DIAGNOSIS — M46.1 SACROILIITIS (HCC): Primary | ICD-10-CM

## 2022-03-08 DIAGNOSIS — M50.120 CERVICAL DISC DISORDER WITH RADICULOPATHY OF MID-CERVICAL REGION: ICD-10-CM

## 2022-03-08 PROCEDURE — 99214 OFFICE O/P EST MOD 30 MIN: CPT | Performed by: NURSE PRACTITIONER

## 2022-03-08 NOTE — PROGRESS NOTES
Assessment  1  Sacroiliitis ([facility] [address] )    2  Cervical disc disorder with radiculopathy of mid-cervical region        Plan  The patient was seen in the office today for follow-up of her chronic neck pain  She had a cervical epidural steroid injection on 2022 and states that it relieved about 90% of her radiating pain however she still does have pain in her neck that is quite significant  She also states that the radiating symptoms are gradually returning  She also reports low back and hip pain that radiates down both of her legs  She was last seen in the office on 2021  She tells me that her PCP started her on Lyrica 50 mg twice a day that she has not started taking yet because she is nervous to try it  I did have a thorough discussion with her about the medication and the side effects and the type of pain it would help and I told her to started only at bedtime for at least a week before adding the 2nd dose  At this time, we will do the followin  Schedule bilateral sacroiliac joint injections with fluoroscopy guidance    2  Schedule C7-T1 cervical epidural steroid injection 2 weeks after sacroiliac joint injections    3  Start Lyrica 50 mg prescribed by PCP  4  Patient may benefit from possible L4-L5 lumbar epidural steroid injection but we will start with a sacroiliac joint injections because she had negative straight leg testing and more overall pain that appeared to be stemming from her sacroiliac joints  Complete risks and benefits including bleeding, infection, tissue reaction, nerve injury and allergic reaction were discussed  The approach was demonstrated using models and literature was provided  Verbal and written consent was obtained  My impressions and treatment recommendations were discussed in detail with the patient who verbalized understanding and had no further questions  Discharge instructions were provided   I personally saw and examined the patient and I agree with the above discussed plan of care  Orders Placed This Encounter   Procedures    [place] spine and pain procedure     Dr Emiile Coronel     Standing Status:   Future     Standing Expiration Date:   3/8/2026     Order Specific Question:   Reason for Exam:     Answer:   B/L SIJ injections with fluoroscopy     Order Specific Question:   Is the patient pregnant? Answer:   No     Order Specific Question:   Anticoagulant hold needed? Answer:   No    [place] spine and pain procedure     Dr Emilie Coronel    2 weeks after SIJ     Standing Status:   Future     Standing Expiration Date:   3/8/2026     Order Specific Question:   Reason for Exam:     Answer:   C7-T1 YANNI with fluoroscopy     Order Specific Question:   Is the patient pregnant? Answer:   No     Order Specific Question:   Anticoagulant hold needed? Answer:   No     No orders of the defined types were placed in this encounter  History of Present Illness    Jet Sosa is a 46 y o  female patient reports a pain score of 8/10 that is constant to some degree and worse in the morning and at night  She states that the quality is throbbing, pressure-like, shooting with numbness and pins and needles  She states that the pain is in her neck and radiates into her shoulders and down her arms bilaterally she also reports low back and hip pain bilaterally that radiates into both legs  I have personally reviewed and/or updated the patient's past medical history, past surgical history, family history, social history, current medications, allergies, and vital signs today  Review of Systems   Respiratory: Negative for shortness of breath  Cardiovascular: Positive for chest pain  Gastrointestinal: Negative for constipation, diarrhea, nausea and vomiting  Musculoskeletal: Positive for gait problem, myalgias, neck pain and neck stiffness  Negative for arthralgias and joint swelling  Skin: Negative for rash  Neurological: Positive for dizziness   Negative for seizures and weakness  Psychiatric/Behavioral: Positive for decreased concentration  All other systems reviewed and are negative        Patient Active Problem List   Diagnosis    Type 2 diabetes mellitus with stage 4 chronic kidney disease, with long-term current use of insulin ([facility] [address] )    Dyslipidemia    Granulomatosis with polyangiitis (HCC)    MPA [facility]sc[facility]angiitis) ([facility])    Asthma    Benign essential HTN    Chronic right shoulder pain    Noncompliance    Bipolar 1 disorder ([facility])    Epigastric pain    Pancreatitis    Ovarian cyst    Postherpetic neuralgia    Anemia of chronic disease    Arthralgia of multiple joints    Cataplexy    Weakness of both lower extremities    Diarrhea of presumed infectious origin    Chronic pelvic pain in female    Plantar wart, right foot    Seasonal allergic rhinitis due to pollen    Gastroesophageal reflux disease    Weakness of both upper extremities    Persistent proteinuria    Left leg pain    Controlled substance agreement signed    Chronic narcotic use    Small fiber neuropathy    IBS (irritable bowel syndrome)    Leukocytosis    Gastroparesis    Hyperosmia    Smell disturbance    Contusion of left hip    Trochanteric bursitis of left hip    Neuropathy    Attention deficit hyperactivity disorder (ADHD)    Elevated blood pressure reading    Costochondritis    Hearing difficulty of both ears    Vaginal atrophy    Alpha-hemolytic Streptococcus positive urine culture    Nausea and vomiting    Smoking    Schizo-affective schizophrenia ([facility] [address] )    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[address]    Stage 4 chronic kidney disease ([facility] [address] )    Cervical radiculopathy    Right arm numbness    Fall at home, initial encounter    Sinus bradycardia    Marijuana use    Difficulty ventilating with mask    Low back pain radiating to left lower extremity    Lump of skin of back    Cervical disc disorder with radiculopathy of mid-cervical region    Neck pain    Cervical spinal stenosis    Depression    Continuous opioid dependence (HCC)    Chronic kidney disease, stage 4 (severe) (HCC)    Chronic pain    COVID       Past Medical History:   Diagnosis Date    ADHD     Anemia of chronic disease     Anxiety     Arthritis ?  Asthma     [facility] disorder ([facility]     Borderline personality disorder ([facility] )     Cataplexy     Chronic abdominal pain     Chronic kidney disease ?  CKD (chronic kidney disease) stage 3, GFR 30-59 ml/min (HCC)     Cushing syndrome (HCC)     Depression ?  Diabetes mellitus ([facility] )     DVT (deep venous thrombosis) (HCC)     GERD (gastroesophageal reflux disease)     Headache(784 0) 3 months    History of acute pancreatitis     felt secondary to Bactrim    History of transfusion     Hypertension     Liver disease     fatty liver    Microscopic polyangiitis (HCC)     Ovarian cyst     PTSD (post-traumatic stress disorder)     Self-inflicted injury     self inflicted skin wounds    Wegener's granulomatosis with renal involvement ([facility] ) 2015       Past Surgical History:   Procedure Laterality Date    ESOPHAGOGASTRODUODENOSCOPY  09/11/2015    mild antral gastritis    GASTRIC STIMULATOR IMPLANT SURGERY  06/25/2020    [place] COLONOSCOPY FLX DX W/COLLJ SPEC WHEN PFRMD N/A 12/14/2018    adenoma removed from the transverse, hyperplastic polyp removed from the left colon    [place] ESOPHAGOGASTRODUODENOSCOPY TRANSORAL DIAGNOSTIC N/A 12/14/2018    gastritis and scant coffee-ground material   Biopsies negative for H  pylori    RELEASE SCAR CONTRACTURE / GRAFT REPAIRS OF HAND Bilateral     UPPER GASTROINTESTINAL ENDOSCOPY  12/26/2019    Dr Rowan Barboza   Botox to the pylorus       Family History   Problem Relation Age of Onset    Arthritis Mother     Depression Mother     Diabetes Mother     Mental illness Mother    Ana María Lamjaja Migraines Mother     No Known Problems Father     Colon cancer Neg Hx     Drug abuse Neg Hx         mother father    Mental illness Neg Hx         disorder, mother father    Cancer Neg Hx     Breast cancer Neg Hx        Social History     Occupational History    Occupation: disability   Tobacco Use    Smoking status: Former Smoker     Packs/day: 1 00     Years: 10 00     Pack years: 10 00     Types: Cigarettes     Quit date: 2011     Years since quittin     Smokeless tobacco: Never Used    Tobacco comment: Stopped smoking 11 years ago   Vaping Use    Vaping Use: Never used   Substance and Sexual Activity    Alcohol use: Never    Drug use: Yes     Types: Marijuana     Comment: marijuana daily    Sexual activity: Not Currently     Partners: Male     Birth control/protection: None       Current Outpatient Medications on File Prior to Visit   Medication Sig    acetaminophen (TYLENOL) 500 mg tablet Take 2 tablets (1,000 mg total) by mouth every 8 (eight) hours as needed for mild pain    amphetamine-dextroamphetamine (ADDERALL XR) 20 MG 24 hr capsule Take 1 capsule (20 mg total) by mouth 2 (two) times a day Max Daily Amount: 40 mg    buPROPion (WELLBUTRIN XL) 300 mg 24 hr tablet Take 1 tablet (300 mg total) by mouth every morning    cholecalciferol (VITAMIN D3) 1,000 units tablet Take 2 tablets (2,000 Units total) by mouth daily for 20 days    clobetasol (TEMOVATE) 0 05 % cream Apply topically 2 (two) times a day    hydrOXYzine HCL (ATARAX) 25 mg tablet     insulin aspart (NovoLOG FlexPen) 100 UNIT/ML injection pen Inject 6 Units under the skin 3 (three) times a day with meals    insulin glargine (Toujeo Max SoloStar) 300 units/mL CONCENTRATED U-300 injection pen (2-unit dial) Inject 22 Units under the skin daily    Linzess 72 MCG CAPS TAKE ONE CAPSULE BY MOUTH ONCE DAILY    ondansetron (ZOFRAN) 4 mg tablet Take 1 tablet (4 mg total) by mouth every 8 (eight) hours as needed for nausea or vomiting    oxyCODONE (ROXICODONE) 10 MG TABS Take 1 tablet (10 mg total) by mouth every 4 (four) hours as needed for moderate pain Max Daily Amount: 60 mg    pantoprazole (PROTONIX) 40 mg tablet Take 1 tablet (40 mg total) by mouth 2 (two) times a day before meals    pregabalin (LYRICA) 50 mg capsule Take 1 capsule (50 mg total) by mouth 2 (two) times a day    Probiotic Product (PROBIOTIC-10 PO) Take 1 tablet by mouth daily    Restasis 0 05 % ophthalmic emulsion     scopolamine (TRANSDERM-SCOP) 1 5 mg/3 days TD 72 hr patch Place 1 patch on the skin every third day    sucralfate (CARAFATE) 1 g/10 mL suspension TAKE 10ML BY MOUTH THREE TIMES A DAY    Alcohol Swabs (Alcohol Pads) 70 % PADS Use 4 (four) times a day    Blood Glucose Monitoring Suppl (FreeStyle Lite) DEIRDRE Use daily    Easy Comfort Lancets MISC USE TO TEST THE BLOOD SUGAR THREE TIMES A DAY    glucose blood (FREESTYLE LITE) test strip Use as instructed    Insulin Pen Needle (Pen Needles) 31G X 8 MM MISC Inject under the skin 4 (four) times a day (with meals and at bedtime)    lidocaine (XYLOCAINE) 5 % ointment APPLY TOPICALLY 2GM TWICE A DAY TO AFFECTED AREAS AS NEEDED FOR MILD PAIN    Lidocaine Viscous HCl (XYLOCAINE) 2 % mucosal solution Swish and spit 15 mL 4 (four) times a day as needed for mouth pain or discomfort    montelukast (SINGULAIR) 10 mg tablet TAKE ONE TABLET BY MOUTH ONCE DAILY AT BEDTIME (Patient not taking: Reported on 3/8/2022)    promethazine (PHENERGAN) 25 mg tablet Take 1 tablet (25 mg total) by mouth every 6 (six) hours as needed for nausea or vomiting (Patient not taking: Reported on 12/23/2021 )     No current facility-administered medications on file prior to visit         Allergies   Allergen Reactions    Prozac [Fluoxetine Hcl]      SI    Bactrim [Sulfamethoxazole-Trimethoprim]      Pt "They think that is what cause the pancreatitis"     Flagyl [Metronidazole] Diarrhea and Abdominal Pain    Lamictal [Lamotrigine] GI Intolerance    Lithium Other (See Comments)    Haldol [Haloperidol] Other (See Comments)     "I don't like it"    Ibuprofen  Lexapro [Escitalopram Oxalate] Rash    Navane [Thiothixene]      SI    Other      "novaine?" antipsychotic       Physical Exam    /60   Pulse 88   Temp 98 2 °F (36 8 °C)   Ht 5' 2" (1 575 m)   LMP  (LMP Unknown)   BMI 25 53 kg/m²     Constitutional: normal, well developed, well nourished, alert, in no distress and non-toxic and no overt pain behavior  Eyes: anicteric  HEENT: grossly intact  Neck: supple, symmetric, trachea midline and no masses   Pulmonary:even and unlabored  Cardiovascular:No edema or pitting edema present  Skin:Normal without rashes or lesions and well hydrated  Psychiatric:Mood and affect appropriate  Neurologic:Cranial Nerves II-XII grossly intact  Musculoskeletal:in wheelchair and Patient had limited range of motion due to pain and stiffness in neck with flexion, extension and rotation    She also had positive RAJ, distraction and Gaenslen testing with tenderness over bilateral sacroiliac joints    Imaging

## 2022-03-09 ENCOUNTER — TELEPHONE (OUTPATIENT)
Dept: FAMILY MEDICINE CLINIC | Facility: CLINIC | Age: 52
End: 2022-03-09

## 2022-03-09 NOTE — TELEPHONE ENCOUNTER
[address] [place] [address]  form received by mail on 03/09/2022  to be completed by PCP  Copy made and placed in PCP folder  Forms to be delivered to PCP mailbox at assigned time

## 2022-03-10 DIAGNOSIS — R21 RASH: ICD-10-CM

## 2022-03-11 ENCOUNTER — ANESTHESIA (OUTPATIENT)
Dept: GASTROENTEROLOGY | Facility: HOSPITAL | Age: 52
End: 2022-03-11

## 2022-03-11 ENCOUNTER — HOSPITAL ENCOUNTER (OUTPATIENT)
Dept: GASTROENTEROLOGY | Facility: HOSPITAL | Age: 52
Setting detail: OUTPATIENT SURGERY
Discharge: HOME | End: 2022-03-11
Attending: INTERNAL MEDICINE | Admitting: INTERNAL MEDICINE
Payer: MEDICARE

## 2022-03-11 ENCOUNTER — ANESTHESIA EVENT (OUTPATIENT)
Dept: GASTROENTEROLOGY | Facility: HOSPITAL | Age: 52
End: 2022-03-11

## 2022-03-11 VITALS
SYSTOLIC BLOOD PRESSURE: 125 MMHG | OXYGEN SATURATION: 98 % | DIASTOLIC BLOOD PRESSURE: 81 MMHG | BODY MASS INDEX: 25.21 KG/M2 | HEIGHT: 62 IN | HEART RATE: 67 BPM | TEMPERATURE: 97.6 F | RESPIRATION RATE: 16 BRPM | WEIGHT: 137 LBS

## 2022-03-11 DIAGNOSIS — Z79.4 TYPE 2 DIABETES MELLITUS WITH OTHER SPECIFIED COMPLICATION, WITH LONG-TERM CURRENT USE OF INSULIN (HCC): ICD-10-CM

## 2022-03-11 DIAGNOSIS — E11.69 TYPE 2 DIABETES MELLITUS WITH OTHER SPECIFIED COMPLICATION, WITH LONG-TERM CURRENT USE OF INSULIN (HCC): ICD-10-CM

## 2022-03-11 DIAGNOSIS — K21.9 GASTROESOPHAGEAL REFLUX DISEASE, UNSPECIFIED WHETHER ESOPHAGITIS PRESENT: ICD-10-CM

## 2022-03-11 DIAGNOSIS — K31.84 GASTROPARESIS: ICD-10-CM

## 2022-03-11 PROCEDURE — 43239 EGD BIOPSY SINGLE/MULTIPLE: CPT | Performed by: INTERNAL MEDICINE

## 2022-03-11 PROCEDURE — 88305 TISSUE EXAM BY PATHOLOGIST: CPT | Performed by: PATHOLOGY

## 2022-03-11 RX ORDER — SODIUM CHLORIDE 9 MG/ML
125 INJECTION, SOLUTION INTRAVENOUS CONTINUOUS
Status: DISCONTINUED | OUTPATIENT
Start: 2022-03-11 | End: 2022-03-15 | Stop reason: HOSPADM

## 2022-03-11 RX ORDER — CLOBETASOL PROPIONATE 0.5 MG/G
CREAM TOPICAL 2 TIMES DAILY
Qty: 60 G | Refills: 1 | Status: SHIPPED | OUTPATIENT
Start: 2022-03-11 | End: 2022-05-03

## 2022-03-11 RX ORDER — PROPOFOL 10 MG/ML
INJECTION, EMULSION INTRAVENOUS AS NEEDED
Status: DISCONTINUED | OUTPATIENT
Start: 2022-03-11 | End: 2022-03-11

## 2022-03-11 RX ORDER — LIDOCAINE HYDROCHLORIDE 20 MG/ML
INJECTION, SOLUTION EPIDURAL; INFILTRATION; INTRACAUDAL; PERINEURAL AS NEEDED
Status: DISCONTINUED | OUTPATIENT
Start: 2022-03-11 | End: 2022-03-11

## 2022-03-11 RX ADMIN — PROPOFOL 30 MG: 10 INJECTION, EMULSION INTRAVENOUS at 09:46

## 2022-03-11 RX ADMIN — PROPOFOL 150 MG: 10 INJECTION, EMULSION INTRAVENOUS at 09:43

## 2022-03-11 RX ADMIN — SODIUM CHLORIDE 125 ML/HR: 0.9 INJECTION, SOLUTION INTRAVENOUS at 09:00

## 2022-03-11 RX ADMIN — PROPOFOL 50 MG: 10 INJECTION, EMULSION INTRAVENOUS at 09:44

## 2022-03-11 RX ADMIN — LIDOCAINE HYDROCHLORIDE 100 MG: 20 INJECTION, SOLUTION EPIDURAL; INFILTRATION; INTRACAUDAL; PERINEURAL at 09:42

## 2022-03-11 NOTE — DISCHARGE INSTRUCTIONS
Please call [phone] with any problems  Your examination today essentially was normal   I did various biopsies and if there is anything significant we will contact you  There is note food in your stomach and no evidence of acid reflux  Upper Endoscopy   WHAT YOU NEED TO KNOW:   An upper endoscopy is also called an upper gastrointestinal (GI) endoscopy, or an esophagogastroduodenoscopy (EGD)  You may feel bloated, gassy, or have some abdominal discomfort after your procedure  Your throat may be sore for 24 to 36 hours  You may burp or pass gas from air that is still inside your body  DISCHARGE INSTRUCTIONS:   Call [phone] if:   · You have sudden chest pain or trouble breathing  Seek care immediately if:   · You feel dizzy or faint  · You have trouble swallowing  · You have severe throat pain  · Your bowel movements are very dark or black  · Your abdomen is hard and firm and you have severe pain  · You vomit blood  Contact your healthcare provider if:   · You feel full or bloated and cannot burp or pass gas  · You have not had a bowel movement for 3 days after your procedure  · You have neck pain  · You have a fever or chills  · You have nausea or are vomiting  · You have a rash or hives  · You have questions or concerns about your endoscopy  Relieve a sore throat:  Suck on throat lozenges or crushed ice  Gargle with a small amount of warm salt water  Mix 1 teaspoon of salt and 1 cup of warm water to make salt water  Relieve gas and discomfort from bloating:  Lie on your right side with a heating pad on your abdomen  Take short walks to help pass gas  Eat small meals until bloating is relieved  Rest after your procedure:  Do not drive or make important decisions until the day after your procedure  Return to your normal activity as directed  You can usually return to work the day after your procedure    Follow up with your healthcare provider as directed: Write down your questions so you remember to ask them during your visits  © Copyright [employer] 2022 Information is for End User's use only and may not be sold, redistributed or otherwise used for commercial purposes  All illustrations and images included in CareNotes® are the copyrighted property of A D A M , Inc  or Luba Garcia  The above information is an  only  It is not intended as medical advice for individual conditions or treatments  Talk to your doctor, nurse or pharmacist before following any medical regimen to see if it is safe and effective for you

## 2022-03-11 NOTE — INTERVAL H&P NOTE
H&P reviewed  After examining the patient I find no changes in the patients condition since the H&P had been written  Events since seen noted  discussed with pt    Vitals:    03/11/22 0840   BP: 135/89   Pulse: 61   Resp: 17   Temp: 97 6 °F (36 4 °C)   SpO2: 99%

## 2022-03-11 NOTE — ANESTHESIA POSTPROCEDURE EVALUATION
Post-Op Assessment Note    CV Status:  Stable    Pain management: adequate     Mental Status:  Alert and awake   Hydration Status:  Euvolemic   PONV Controlled:  Controlled   Airway Patency:  Patent      Post Op Vitals Reviewed: Yes      Staff: Anesthesiologist         No complications documented      BP      Temp      Pulse     Resp      SpO2      /81   Pulse 67   Temp 97 6 °F (36 4 °C) (Temporal)   Resp 16   Ht 5' 2" (1 575 m)   Wt 62 1 kg (137 lb)   LMP  (LMP Unknown)   SpO2 98%   BMI 25 06 kg/m²

## 2022-03-11 NOTE — ANESTHESIA PREPROCEDURE EVALUATION
Procedure:  EGD    Relevant Problems   CARDIO   (+) Benign essential HTN   (+) MPA (microscopic polyangiitis) (HCC)   (+) Sinus bradycardia      ENDO   (+) Type 2 diabetes mellitus with stage 4 chronic kidney disease, with long-term current use of insulin (HCC)      GI/HEPATIC   (+) Gastroesophageal reflux disease   (+) Pancreatitis      /RENAL   (+) Chronic kidney disease, stage 4 (severe) (HCC)   (+) Stage 4 chronic kidney disease (HCC)      HEMATOLOGY   (+) Anemia of chronic disease      MUSCULOSKELETAL   (+) Low back pain radiating to left lower extremity      NEURO/PSYCH   (+) Chronic pain   (+) Chronic pelvic pain in female   (+) Chronic right shoulder pain   (+) Continuous opioid dependence (HCC)   (+) Depression   (+) PTSD (post-traumatic stress disorder)      PULMONARY  marijuana   (+) Asthma   (+) Smoking      Other   (+) Attention deficit hyperactivity disorder (ADHD)   (+) Bipolar 1 disorder (HCC)   (+) Cervical radiculopathy   (+) Cervical spinal stenosis   (+) Chronic narcotic use   (+) Difficulty ventilating with mask   (+) Dyslipidemia   (+) Gastroparesis   (+) IBS (irritable bowel syndrome)   (+) Schizo-affective schizophrenia ([facility] [address] )        Physical Exam    Airway    Mallampati score: III  TM Distance: >3 FB  Neck ROM: full     Dental   No notable dental hx     Cardiovascular  Rhythm: regular, Rate: normal, Cardiovascular exam normal    Pulmonary  Pulmonary exam normal Breath sounds clear to auscultation,     Other Findings        Anesthesia Plan  ASA Score- 3     Anesthesia Type- general with ASA Monitors  Additional Monitors:   Airway Plan:           Plan Factors-    Chart reviewed  Patient summary reviewed  Patient is a current smoker  Patient instructed to abstain from smoking on day of procedure  Patient did not smoke on day of surgery  Obstructive sleep apnea risk education given perioperatively  Induction- intravenous      Postoperative Plan-     Informed Consent- Anesthetic plan and risks discussed with patient

## 2022-03-14 ENCOUNTER — TELEPHONE (OUTPATIENT)
Dept: FAMILY MEDICINE CLINIC | Facility: CLINIC | Age: 52
End: 2022-03-14

## 2022-03-14 NOTE — TELEPHONE ENCOUNTER
[address] [address] form received by fax on 03/14/2022  to be completed by PCP  Copy made and placed in PCP folder  Forms to be delivered to PCP mailbox at assigned time      Date Of Service: 01/18/2022

## 2022-03-17 ENCOUNTER — OFFICE VISIT (OUTPATIENT)
Dept: AUDIOLOGY | Age: 52
End: 2022-03-17
Payer: MEDICARE

## 2022-03-17 DIAGNOSIS — G89.4 CHRONIC PAIN SYNDROME: ICD-10-CM

## 2022-03-17 DIAGNOSIS — F31.9 BIPOLAR 1 DISORDER (HCC): ICD-10-CM

## 2022-03-17 DIAGNOSIS — M31.31 GRANULOMATOSIS WITH POLYANGIITIS WITH RENAL INVOLVEMENT (HCC): Chronic | ICD-10-CM

## 2022-03-17 DIAGNOSIS — H90.3 SENSORY HEARING LOSS, BILATERAL: Primary | ICD-10-CM

## 2022-03-17 PROCEDURE — 92557 COMPREHENSIVE HEARING TEST: CPT | Performed by: AUDIOLOGIST-HEARING AID FITTER

## 2022-03-17 PROCEDURE — 92567 TYMPANOMETRY: CPT | Performed by: AUDIOLOGIST-HEARING AID FITTER

## 2022-03-17 NOTE — PROGRESS NOTES
HEARING EVALUATION    Name:  Lupe Espinoza  :  1970  Age:  46 y o  Date of Evaluation: 22     History: Tinnitus and sensitivity to sound in the left ear  Reason for visit: Lupe Espinoza is being seen today at the request of Dr Kathryn Vazquez for an evaluation of hearing  Patient reports a decrease in hearing since her last hearing evaluation in 2019  She also reports a sensitivity to sound in the left ear  Patient denies otalgia, otorrhea, dizziness, fullness, and tinnitus  Patient denies a family history of hearing loss and noise exposure  EVALUATION:    Otoscopic Evaluation:   Right Ear: Clear and healthy ear canal and tympanic membrane   Left Ear: Clear and healthy ear canal and tympanic membrane    Tympanometry:   Right: Type A - normal middle ear pressure and compliance   Left: Type A - normal middle ear pressure and compliance    Audiogram Results:  Pure tone testing revealed a mild sloping to severe sensorineural hearing loss in the  right ear and a normal hearing in the left ear  SRT and PTA are in agreement, but stimulus had to be presented as warble tones to obtain reliable responses  Word recognition scores were excellent bilaterally   Distortion Product Otoacoustic Emissions:   Right: Normal Consistent with normal cochlear function and peripheral hearing   Left: Reduced Consistent with abnormal cochlear function with hearing loss equal to or greater than a moderate hearing loss    *see attached audiogram    RECOMMENDATIONS:  Annual hearing eval, Consult ENT and Return to [facility]  for F/U    PATIENT EDUCATION:   Discussed results and recommendations with patient  Questions were addressed and the patient was encouraged to contact our department should concerns arise  Christy Valentine  Supervising Clinical Audiologist     [facility] [place]     Audiology Intern

## 2022-03-18 RX ORDER — DEXTROAMPHETAMINE SACCHARATE, AMPHETAMINE ASPARTATE MONOHYDRATE, DEXTROAMPHETAMINE SULFATE AND AMPHETAMINE SULFATE 5; 5; 5; 5 MG/1; MG/1; MG/1; MG/1
20 CAPSULE, EXTENDED RELEASE ORAL 2 TIMES DAILY
Qty: 60 CAPSULE | Refills: 0 | Status: SHIPPED | OUTPATIENT
Start: 2022-03-18 | End: 2022-04-17 | Stop reason: SDUPTHER

## 2022-03-18 RX ORDER — OXYCODONE HYDROCHLORIDE 10 MG/1
10 TABLET ORAL EVERY 4 HOURS PRN
Qty: 180 TABLET | Refills: 0 | Status: SHIPPED | OUTPATIENT
Start: 2022-03-18 | End: 2022-04-17 | Stop reason: SDUPTHER

## 2022-03-29 ENCOUNTER — OFFICE VISIT (OUTPATIENT)
Dept: FAMILY MEDICINE CLINIC | Facility: CLINIC | Age: 52
End: 2022-03-29

## 2022-03-29 VITALS
DIASTOLIC BLOOD PRESSURE: 72 MMHG | RESPIRATION RATE: 18 BRPM | HEART RATE: 70 BPM | HEIGHT: 62 IN | TEMPERATURE: 97.6 F | WEIGHT: 143 LBS | BODY MASS INDEX: 26.31 KG/M2 | SYSTOLIC BLOOD PRESSURE: 100 MMHG | OXYGEN SATURATION: 98 %

## 2022-03-29 DIAGNOSIS — Z79.4 TYPE 2 DIABETES MELLITUS WITH STAGE 4 CHRONIC KIDNEY DISEASE, WITH LONG-TERM CURRENT USE OF INSULIN (HCC): ICD-10-CM

## 2022-03-29 DIAGNOSIS — Z00.00 ENCOUNTER FOR ANNUAL WELLNESS VISIT (AWV) IN MEDICARE PATIENT: Primary | ICD-10-CM

## 2022-03-29 DIAGNOSIS — G62.9 NEUROPATHY: ICD-10-CM

## 2022-03-29 DIAGNOSIS — N18.4 CHRONIC KIDNEY DISEASE, STAGE 4 (SEVERE) (HCC): ICD-10-CM

## 2022-03-29 DIAGNOSIS — E11.22 TYPE 2 DIABETES MELLITUS WITH STAGE 4 CHRONIC KIDNEY DISEASE, WITH LONG-TERM CURRENT USE OF INSULIN (HCC): ICD-10-CM

## 2022-03-29 DIAGNOSIS — G89.4 CHRONIC PAIN SYNDROME: ICD-10-CM

## 2022-03-29 DIAGNOSIS — N18.4 TYPE 2 DIABETES MELLITUS WITH STAGE 4 CHRONIC KIDNEY DISEASE, WITH LONG-TERM CURRENT USE OF INSULIN (HCC): ICD-10-CM

## 2022-03-29 DIAGNOSIS — M79.644 PAIN OF FINGER OF RIGHT HAND: ICD-10-CM

## 2022-03-29 DIAGNOSIS — K31.84 GASTROPARESIS: ICD-10-CM

## 2022-03-29 DIAGNOSIS — E66.3 OVERWEIGHT: ICD-10-CM

## 2022-03-29 DIAGNOSIS — H91.93 HEARING DIFFICULTY OF BOTH EARS: ICD-10-CM

## 2022-03-29 DIAGNOSIS — Z13.31 DEPRESSION SCREEN: ICD-10-CM

## 2022-03-29 DIAGNOSIS — R11.2 NON-INTRACTABLE VOMITING WITH NAUSEA, UNSPECIFIED VOMITING TYPE: ICD-10-CM

## 2022-03-29 PROCEDURE — 3078F DIAST BP <80 MM HG: CPT | Performed by: PHYSICIAN ASSISTANT

## 2022-03-29 PROCEDURE — 99215 OFFICE O/P EST HI 40 MIN: CPT | Performed by: PHYSICIAN ASSISTANT

## 2022-03-29 PROCEDURE — G0439 PPPS, SUBSEQ VISIT: HCPCS | Performed by: PHYSICIAN ASSISTANT

## 2022-03-29 PROCEDURE — 3074F SYST BP LT 130 MM HG: CPT | Performed by: PHYSICIAN ASSISTANT

## 2022-03-29 RX ORDER — ONDANSETRON HYDROCHLORIDE 8 MG/1
8 TABLET, FILM COATED ORAL EVERY 8 HOURS PRN
Qty: 30 TABLET | Refills: 2 | Status: SHIPPED | OUTPATIENT
Start: 2022-03-29

## 2022-03-29 NOTE — ASSESSMENT & PLAN NOTE
- Continue follow-up with audiology/ ENT as scheduled  Patient is currently in the process of obtaining hearing aids

## 2022-03-29 NOTE — ASSESSMENT & PLAN NOTE
- Patient has history of type 2 diabetes with long-term use of insulin with associated neuropathy and chronic kidney disease stage 3-4   - Continue Toujeo 22 units daily and NovoLog 6 units, 3 times daily with meals   - Continue checking daily blood sugars    Lab Results   Component Value Date    HGBA1C 5 7 02/28/2022    HGBA1C 5 7 09/29/2021    HGBA1C 6 0 02/15/2021

## 2022-03-29 NOTE — PROGRESS NOTES
Assessment and Plan:     Problem List Items Addressed This Visit        Digestive    Gastroparesis     - Symptoms currently stable  - Continue Protonix, Carafate   - Continue follow-up with [facility] gastroenterology as scheduled  - Continue follow-up with [facility]  - Patient was evaluated by surgical team during recent hospitalization who notes outpatient consideration for subtotal gastrectomy with gastrojejunostomy tube  Nausea and vomiting    Relevant Medications    ondansetron (ZOFRAN) 8 mg tablet       Endocrine    Type 2 diabetes mellitus with stage 4 chronic kidney disease, with long-term current use of insulin ([facility] )     - Patient has history of type 2 diabetes with long-term use of insulin with associated neuropathy and chronic kidney disease stage 3-4   - Continue Toujeo 22 units daily and NovoLog 6 units, 3 times daily with meals   - Continue checking daily blood sugars  Lab Results   Component Value Date    HGBA1C 5 7 02/28/2022    HGBA1C 5 7 09/29/2021    HGBA1C 6 0 02/15/2021               Nervous and Auditory    Neuropathy     - Patient had previously tried gabapentin, but it caused restless legs syndrome  Patient recently tried Cymbalta, but it caused side effect of dizziness, abdominal pain, vomiting   - Patient is now prescribed Lyrica 50 mg twice daily and is now willing to start taking it consistently  Patient aware to start taking lyrica 50 mg daily at bedtime for one week then increase to twice daily  Genitourinary    Chronic kidney disease, stage 4 (severe) ([facility] )     - Continue follow-up with nephrology as scheduled  Other    Hearing difficulty of both ears     - Continue follow-up with audiology/ ENT as scheduled  Patient is currently in the process of obtaining hearing aids  Chronic pain     - Stable  Continue oxycodone 10 mg, every 4 hours as needed  - PDMP reviewed  No red flags noted  UDS up-to-date            Pain of finger of right hand     - Patient has been experiencing occasional sharp stabbing pain throughout PIP joint of right middle finger with certain movements  - Will refer to hand specialist for further evaluation and management  Relevant Orders    Ambulatory Referral to Orthopedic Surgery      Other Visit Diagnoses     Encounter for annual wellness visit (AWV) in Medicare patient    -  Primary    Depression screen        Overweight               Preventive health issues were discussed with patient, and age appropriate screening tests were ordered as noted in patient's After Visit Summary  Personalized health advice and appropriate referrals for health education or preventive services given if needed, as noted in patient's After Visit Summary       History of Present Illness:     Patient presents for Medicare Annual Wellness visit    Patient Care Team:  Evelyn Ge PA-C as PCP - General (Family Medicine)     Problem List:     Patient Active Problem List   Diagnosis    Type 2 diabetes mellitus with stage 4 chronic kidney disease, with long-term current use of insulin ([address] )    Dyslipidemia    Granulomatosis with polyangiitis ([address] )    MPA (microscopic polyangiitis) (HCC)    Asthma    Benign essential HTN    Chronic right shoulder pain    Noncompliance    Bipolar 1 disorder (HCC)    Epigastric pain    Pancreatitis    Ovarian cyst    Postherpetic neuralgia    Anemia of chronic disease    Arthralgia of multiple joints    Cataplexy    Weakness of both lower extremities    Diarrhea of presumed infectious origin    Chronic pelvic pain in female    Plantar wart, right foot    Seasonal allergic rhinitis due to pollen    Gastroesophageal reflux disease    Weakness of both upper extremities    Persistent proteinuria    Left leg pain    Controlled substance agreement signed    Chronic narcotic use    Small fiber neuropathy    IBS (irritable bowel syndrome)    Leukocytosis    Gastroparesis    Hyperosmia    Smell disturbance    Contusion of left hip    Trochanteric bursitis of left hip    Neuropathy    Attention deficit hyperactivity disorder (ADHD)    Elevated blood pressure reading    Costochondritis    Hearing difficulty of both ears    Vaginal atrophy    Alpha-hemolytic Streptococcus positive urine culture    Nausea and vomiting    Smoking    Sch[facility]affective schizophrenia ([facility]    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[facility] )    Stage 4 chronic kidney disease ([facility] )    Cervical radiculopathy    Right arm numbness    Fall at home, initial encounter    Sinus bradycardia    Marijuana use    Difficulty ventilating with mask    Low back pain radiating to left lower extremity    Lump of skin of back    Cervical disc disorder with radiculopathy of mid-cervical region    Neck pain    Cervical spinal stenosis    Depression    Continuous opioid dependence (HCC)    Chronic kidney disease, stage 4 (severe) [facility]    Chronic pain    COVID    PTSD (post-traumatic stress disorder)    Pain of finger of right hand      Past Medical and Surgical History:     Past Medical History:   Diagnosis Date    ADHD     Anemia of chronic disease     Anxiety     Arthritis ?  Asthma     Bipolar disorder ([place] [address] )     Borderline personality disorder ([place] [address] )     Cataplexy     Chronic abdominal pain     Chronic kidney disease ?  CKD (chronic kidney disease) stage 3, GFR 30-59 ml/min ([facility])     Cushing syndrome ([facility])     Depression ?     Diabetes mellitus ([facility]     DVT (deep venous thrombosis) ([facility])     GERD (gastroesophageal reflux disease)     Headache(784 0) 3 months    History of acute pancreatitis     felt secondary to Bactrim    History of transfusion     Hypertension     Liver disease     fatty liver    Microscopic polyangiitis (HCC)     Ovarian cyst     PTSD (post-traumatic stress disorder)     Self-inflicted injury     self inflicted skin wounds    Wegener's granulomatosis with renal involvement ([facility] )      Past Surgical History:   Procedure Laterality Date    ESOPHAGOGASTRODUODENOSCOPY  2015    mild antral gastritis    GASTRIC STIMULATOR IMPLANT SURGERY  2020    VA COLONOSCOPY FLX DX W/COLLJ SPEC WHEN PFRMD N/A 2018    adenoma removed from the transverse, hyperplastic polyp removed from the left colon    VA ESOPHAGOGASTRODUODENOSCOPY TRANSORAL DIAGNOSTIC N/A 2018    gastritis and scant coffee-ground material   Biopsies negative for H  pylori    RELEASE SCAR CONTRACTURE / GRAFT REPAIRS OF HAND Bilateral     UPPER GASTROINTESTINAL ENDOSCOPY  2019    Dr Shanique Kohli   Botox to the pylorus      Family History:     Family History   Problem Relation Age of Onset    Arthritis Mother     Depression Mother     Diabetes Mother     Mental illness Mother    [place] Migraines Mother     No Known Problems Father     Colon cancer Neg Hx     Drug abuse Neg Hx         mother father    Mental illness Neg Hx         disorder, mother father    Cancer Neg Hx     Breast cancer Neg Hx       Social History:     Social History     Socioeconomic History    Marital status: Single     Spouse name: None    Number of children: None    Years of education: None    Highest education level: None   Occupational History    Occupation: disability   Tobacco Use    Smoking status: Former Smoker     Packs/day: 1 00     Years: 10 00     Pack years: 10 00     Types: Cigarettes     Quit date: 2011     Years since quittin 2    Smokeless tobacco: Never Used    Tobacco comment: Stopped smoking 11 years ago   Vaping Use    Vaping Use: Never used   Substance and Sexual Activity    Alcohol use: Never    Drug use: Yes     Types: Marijuana     Comment: marijuana daily    Sexual activity: Not Currently     Partners: Male     Birth control/protection: None   Other Topics Concern    None   Social History Narrative    Social hx reviewed     No pref on Latter-day beliefs     Daily caffeine consumption 1 serving/day     Social Determinants of Health     Financial Resource Strain: Low Risk     Difficulty of Paying Living Expenses: Not very hard   Food Insecurity: No Food Insecurity    Worried About Running Out of Food in the Last Year: Never true    [place]oto of Food in the Last Year: Never true   Transportation Needs: No Transportation Needs    Lack of Transportation (Medical): No    Lack of Transportation (Non-Medical):  No   Physical Activity: Not on file   Stress: Not on file   Social Connections: Not on file   Intimate Partner Violence: Not on file   Housing Stability: Not on file      Medications and Allergies:     Current Outpatient Medications   Medication Sig Dispense Refill    acetaminophen (TYLENOL) 500 mg tablet Take 2 tablets (1,000 mg total) by mouth every 8 (eight) hours as needed for mild pain 60 tablet 1    Alcohol Swabs (Alcohol Pads) 70 % PADS Use 4 (four) times a day 400 each 2    amphetamine-dextroamphetamine (ADDERALL XR) 20 MG 24 hr capsule Take 1 capsule (20 mg total) by mouth 2 (two) times a day Max Daily Amount: 40 mg 60 capsule 0    Blood Glucose Monitoring Suppl (FreeStyle Lite) DEIRDRE Use daily 1 each 0    buPROPion (WELLBUTRIN XL) 300 mg 24 hr tablet Take 1 tablet (300 mg total) by mouth every morning 30 tablet 2    cholecalciferol (VITAMIN D3) 1,000 units tablet Take 2 tablets (2,000 Units total) by mouth daily for 20 days 40 tablet 0    clobetasol (TEMOVATE) 0 05 % cream Apply topically 2 (two) times a day 60 g 1    Easy Comfort Lancets MISC USE TO TEST THE BLOOD SUGAR THREE TIMES A  each 10    glucose blood (FREESTYLE LITE) test strip Use as instructed 100 strip 0    hydrOXYzine HCL (ATARAX) 25 mg tablet       insulin aspart (NovoLOG FlexPen) 100 UNIT/ML injection pen Inject 6 Units under the skin 3 (three) times a day with meals 15 mL 0    insulin glargine (Toujeo Max SoloStar) 300 units/mL CONCENTRATED U-300 injection pen (2-unit dial) Inject 22 Units under the skin daily 9 mL 0    Insulin Pen Needle (Pen Needles) 31G X 8 MM MISC Inject under the skin 4 (four) times a day (with meals and at bedtime) 100 each 11    lidocaine (XYLOCAINE) 5 % ointment APPLY TOPICALLY 2GM TWICE A DAY TO AFFECTED AREAS AS NEEDED FOR MILD PAIN 250 g 8    Lidocaine Viscous HCl (XYLOCAINE) 2 % mucosal solution Swish and spit 15 mL 4 (four) times a day as needed for mouth pain or discomfort 15 mL 1    Linzess 72 MCG CAPS TAKE ONE CAPSULE BY MOUTH ONCE DAILY 90 capsule 10    ondansetron (ZOFRAN) 8 mg tablet Take 1 tablet (8 mg total) by mouth every 8 (eight) hours as needed for nausea or vomiting 30 tablet 2    oxyCODONE (ROXICODONE) 10 MG TABS Take 1 tablet (10 mg total) by mouth every 4 (four) hours as needed for moderate pain Max Daily Amount: 60 mg 180 tablet 0    pantoprazole (PROTONIX) 40 mg tablet Take 1 tablet (40 mg total) by mouth 2 (two) times a day before meals 180 tablet 1    pregabalin (LYRICA) 50 mg capsule Take 1 capsule (50 mg total) by mouth 2 (two) times a day 60 capsule 1    Probiotic Product (PROBIOTIC-10 PO) Take 1 tablet by mouth daily      promethazine (PHENERGAN) 25 mg tablet Take 1 tablet (25 mg total) by mouth every 6 (six) hours as needed for nausea or vomiting 60 tablet 3    Restasis 0 05 % ophthalmic emulsion       scopolamine (TRANSDERM-SCOP) 1 5 mg/3 days TD 72 hr patch Place 1 patch on the skin every third day 10 patch 0    sucralfate (CARAFATE) 1 g/10 mL suspension TAKE 10ML BY MOUTH THREE TIMES A  mL 3     No current facility-administered medications for this visit       Allergies   Allergen Reactions    Prozac [Fluoxetine Hcl]      SI    Bactrim [Sulfamethoxazole-Trimethoprim]      Pt "They think that is what cause the pancreatitis"     Flagyl [Metronidazole] Diarrhea and Abdominal Pain    Lamictal [Lamotrigine] GI Intolerance    Lithium Other (See Comments)    Haldol [Haloperidol] Other (See Comments)     "I don't like it"  Ibuprofen     Lexapro [Escitalopram Oxalate] Rash    Navane [Thiothixene]      SI    Other      "novaine?" antipsychotic      Immunizations:     Immunization History   Administered Date(s) Administered    INFLUENZA 10/13/2008, 12/23/2010, 10/27/2011    Pneumococcal Conjugate 13-Valent 05/12/2015    Pneumococcal Polysaccharide PPV23 10/13/2008, 09/17/2015    Rabies 09/27/2007      Health Maintenance:         Topic Date Due    Hepatitis C Screening  Never done    HIV Screening  Never done    Cervical Cancer Screening  05/12/2024    Colorectal Cancer Screening  12/14/2028         Topic Date Due    COVID-19 Vaccine (1) Never done    DTaP,Tdap,and Td Vaccines (1 - Tdap) Never done      Medicare Health Risk Assessment:     /72 (BP Location: Right arm, Patient Position: Sitting, Cuff Size: Standard)   Pulse 70   Temp 97 6 °F (36 4 °C) (Temporal)   Resp 18   Ht 5' 2" (1 575 m)   Wt 64 9 kg (143 lb)   LMP  (LMP Unknown)   SpO2 98%   Breastfeeding No   BMI 26 16 kg/m²      Harriett Li is here for her Subsequent Wellness visit  Health Risk Assessment:   Patient rates overall health as fair  Patient feels that their physical health rating is slightly worse  Patient is satisfied with their life  Eyesight was rated as slightly worse  Hearing was rated as much worse  Patient feels that their emotional and mental health rating is slightly better  Patients states they are never, rarely angry  Patient states they are always unusually tired/fatigued  Pain experienced in the last 7 days has been a lot  Patient's pain rating has been 10/10  Patient states that she has experienced weight loss or gain in last 6 months  Depression Screening:   PHQ-9 Score: 0      Fall Risk Screening:    In the past year, patient has experienced: history of falling in past year    Number of falls: 2 or more  Injured during fall?: Yes    Feels unsteady when standing or walking?: Yes    Worried about falling?: Yes Urinary Incontinence Screening:   Patient has not leaked urine accidently in the last six months  Home Safety:  Patient does not have trouble with stairs inside or outside of their home  Patient has working smoke alarms and has working carbon monoxide detector  Home safety hazards include: none  Nutrition:   Current diet is Regular  Medications:   Patient is not currently taking any over-the-counter supplements  Patient is able to manage medications  Activities of Daily Living (ADLs)/Instrumental Activities of Daily Living (IADLs):   Walk and transfer into and out of bed and chair?: Yes  Dress and groom yourself?: No    Bathe or shower yourself?: Yes    Feed yourself?  Yes  Do your laundry/housekeeping?: No  Manage your money, pay your bills and track your expenses?: Yes  Make your own meals?: No    Do your own shopping?: No    Previous Hospitalizations:   Any hospitalizations or ED visits within the last 12 months?: Yes    How many hospitalizations have you had in the last year?: 1-2    Advance Care Planning:   Living will: No    Durable POA for healthcare: No    Advanced directive: No    Five wishes given: Yes      Cognitive Screening:   Provider or family/friend/caregiver concerned regarding cognition?: No    PREVENTIVE SCREENINGS      Cardiovascular Screening:    General: Screening Not Indicated and History Lipid Disorder      Diabetes Screening:     General: Screening Not Indicated and History Diabetes      Colorectal Cancer Screening:     General: Screening Current      Breast Cancer Screening:     General: Risks and Benefits Discussed      Cervical Cancer Screening:    General: Screening Current      Osteoporosis Screening:    General: Screening Not Indicated      Abdominal Aortic Aneurysm (AAA) Screening:        General: Screening Not Indicated      Lung Cancer Screening:     General: Screening Not Indicated      Hepatitis C Screening:    General: Screening Current    Screening, Brief Intervention, and Referral to Treatment (SBIRT)    Screening  Typical number of drinks in a day: 0  Typical number of drinks in a week: 0  Interpretation: Low risk drinking behavior  AUDIT-C Screenin) How often did you have a drink containing alcohol in the past year? never  2) How many drinks did you have on a typical day when you were drinking in the past year? 0  3) How often did you have 6 or more drinks on one occasion in the past year? never    AUDIT-C Score: 0  Interpretation: Score 0-2 (female): Negative screen for alcohol misuse    Single Item Drug Screening:  How often have you used an illegal drug (including marijuana) or a prescription medication for non-medical reasons in the past year? daily or almost daily    Single Item Drug Screen Score: 4  Interpretation: POSITIVE screen for possible drug use disorder    Drug Abuse Screening Test (DAST-10):  1) Have you used drugs other than those required for medical reasons? Yes  2) Do you abuse more than one drug at a time? No  3) Are you always able to stop using drugs when you want to? No  4) Have you had "blackouts" or "flashbacks" as a result of drug use? No  5) Do you ever feel bad or guilty about your drug use? No  6) Does your spouse (or parents) ever complain about your involvement with drugs? No  7) Have you neglected your family because of your use of drugs? No  8) Have you engaged in illegal activities in order to obtain drugs? No  9) Have you ever experienced withdrawal symptoms (felt sick) when you stopped taking drugs? Yes  10) Have you had medical problems as a result of your drug use (e g , memory loss, hepatitis, convulsions, bleeding, etc )? Yes    DAST-10 Score: 4  Interpretation: Moderate level problems related to drug abuse    Brief Intervention  Alcohol & drug use screenings were reviewed  No concerns regarding substance use disorder identified       Review of Current Opioid Use  Opioid Risk Tool (ORT) Score: 5  Opioid Risk Tool (ORT) Interpretation: Score 4-7: Moderate risk for opioid misuse    [place] PDMP or [place]  reviewed  No red flags were identified    Current treatment plan: Continue current medications with close follow up        Educational information on non-opioid treatment options provided    Other Counseling Topics:   Car/seat belt/driving safety, sunscreen and calcium and vitamin D intake and regular weightbearing exercise         Talat Mcqueen PA-C

## 2022-03-29 NOTE — ASSESSMENT & PLAN NOTE
- Patient has been experiencing occasional sharp stabbing pain throughout PIP joint of right middle finger with certain movements  - Will refer to hand specialist for further evaluation and management

## 2022-03-29 NOTE — PROGRESS NOTES
Assessment/Plan:    Pain of finger of right hand  - Patient has been experiencing occasional sharp stabbing pain throughout PIP joint of right middle finger with certain movements  - Will refer to hand specialist for further evaluation and management  Chronic kidney disease, stage 4 (severe) ([facility])  - Continue follow-up with nephrology as scheduled  Type 2 diabetes mellitus with stage 4 chronic kidney disease, with long-term current use of insulin ([address] )  - Patient has history of type 2 diabetes with long-term use of insulin with associated neuropathy and chronic kidney disease stage 3-4   - Continue Toujeo 22 units daily and NovoLog 6 units, 3 times daily with meals   - Continue checking daily blood sugars  Lab Results   Component Value Date    HGBA1C 5 7 02/28/2022    HGBA1C 5 7 09/29/2021    HGBA1C 6 0 02/15/2021       Neuropathy  - Patient had previously tried gabapentin, but it caused restless legs syndrome  Patient recently tried Cymbalta, but it caused side effect of dizziness, abdominal pain, vomiting   - Patient is now prescribed Lyrica 50 mg twice daily and is now willing to start taking it consistently  Patient aware to start taking lyrica 50 mg daily at bedtime for one week then increase to twice daily  Chronic pain  - Stable  Continue oxycodone 10 mg, every 4 hours as needed  - PDMP reviewed  No red flags noted  UDS up-to-date  Gastroparesis  - Symptoms currently stable  - Continue Protonix, Carafate   - Continue follow-up with [facility] gastroenterology as scheduled  - Continue follow-up with [facility]  - Patient was evaluated by surgical team during recent hospitalization who notes outpatient consideration for subtotal gastrectomy with gastrojejunostomy tube          Diagnoses and all orders for this visit:    Encounter for annual wellness visit (AWV) in Medicare patient    Non-intractable vomiting with nausea, unspecified vomiting type  -     ondansetron (ZOFRAN) 8 mg tablet; Take 1 tablet (8 mg total) by mouth every 8 (eight) hours as needed for nausea or vomiting    Pain of finger of right hand  -     Ambulatory Referral to Orthopedic Surgery; Future    Depression screen    Chronic kidney disease, stage 4 (severe) (HCC)    Type 2 diabetes mellitus with stage 4 chronic kidney disease, with long-term current use of insulin (HCC)    Neuropathy    Chronic pain syndrome    Gastroparesis          All of patients questions were answered  Patient understands and agrees with the above plan  Return in about 3 months (around 6/29/2022) for Next scheduled follow up gastroparesis  Jory Pulido PA-C  03/29/22  [facility] FAITH Jackson          Subjective:     Patient ID: Gogo Weldon  is a 46 y o  female with known PHM of gastroparesis, GERD, constipation, ADHD, type 2 diabetes mellitus, cataplexy, Wegener's granulomatosis, small fiber neuropathy, bipolar 1 disorder who presents today in office for AWV  Patient is accompanied today by her home healthcare aide  - Patient is a 46 y o  female who presents today for AWV  Overall, patient notes she has been doing well  Patient notes she will be moving to [place] to live with her sister within the next few months  Patient is very happy about this and is looking forward to it  Patient is just worried about transitioning her medical care from [place] to [place] since she has an extensive medical history and follows with many different specialists  - Patient notes she did try taking Lyrica one night and it did help and she did not experience any side effects  Patient notes she is going to now start taking it regularly  Patient notes she has been following with pain management and is scheduled for some upcoming injections  Patient notes she is also following with audiology and will be receiving hearing aids        The following portions of the patient's history were reviewed and updated as appropriate: allergies, current medications, past family history, past medical history, past social history, past surgical history and problem list         Review of Systems   Constitutional: Negative for chills and fever  HENT: Positive for hearing loss  Negative for congestion and sore throat  Respiratory: Negative for cough, chest tightness and shortness of breath  Cardiovascular: Negative for chest pain and leg swelling  Gastrointestinal: Negative for abdominal pain, constipation, diarrhea and vomiting  Genitourinary: Negative for dysuria  Musculoskeletal: Positive for arthralgias, back pain, myalgias and neck pain  Neurological: Positive for numbness and headaches  Negative for dizziness  Psychiatric/Behavioral: Negative for behavioral problems, self-injury and suicidal ideas  BMI Counseling: Body mass index is 26 16 kg/m²  The BMI is above normal  Nutrition recommendations include encouraging healthy choices of fruits and vegetables  Exercise recommendations include vigorous physical activity 75 minutes/week  No pharmacotherapy was ordered  Rationale for BMI follow-up plan is due to patient being overweight or obese  Objective:   Vitals:    03/29/22 1345   BP: 100/72   BP Location: Right arm   Patient Position: Sitting   Cuff Size: Standard   Pulse: 70   Resp: 18   Temp: 97 6 °F (36 4 °C)   TempSrc: Temporal   SpO2: 98%   Weight: 64 9 kg (143 lb)   Height: 5' 2" (1 575 m)         Physical Exam  Vitals and nursing note reviewed  Constitutional:       General: She is not in acute distress  Appearance: She is well-developed  Comments: Examined in motorized wheelchair   HENT:      Head: Normocephalic and atraumatic  Right Ear: External ear normal       Left Ear: External ear normal       Nose: Nose normal    Eyes:      Conjunctiva/sclera: Conjunctivae normal    Cardiovascular:      Rate and Rhythm: Normal rate and regular rhythm  Pulses: Normal pulses        Heart sounds: Normal heart sounds  Pulmonary:      Effort: Pulmonary effort is normal  No respiratory distress  Breath sounds: Normal breath sounds  No wheezing  Musculoskeletal:      Cervical back: Normal range of motion and neck supple  Skin:     General: Skin is warm and dry  Neurological:      Mental Status: She is alert and oriented to person, place, and time  Psychiatric:         Behavior: Behavior normal            PHQ-2/9 Depression Screening    Little interest or pleasure in doing things: 0 - not at all  Feeling down, depressed, or hopeless: 0 - not at all  Trouble falling or staying asleep, or sleeping too much: 0 - not at all  Feeling tired or having little energy: 0 - not at all  Poor appetite or overeatin - not at all  Feeling bad about yourself - or that you are a failure or have let yourself or your family down: 0 - not at all  Trouble concentrating on things, such as reading the newspaper or watching television: 0 - not at all  Moving or speaking so slowly that other people could have noticed   Or the opposite - being so fidgety or restless that you have been moving around a lot more than usual: 0 - not at all  Thoughts that you would be better off dead, or of hurting yourself in some way: 0 - not at all  PHQ-9 Score: 0   PHQ-9 Interpretation: No or Minimal depression

## 2022-03-29 NOTE — PATIENT INSTRUCTIONS
Care Everywhere - Check to see if the family practice office has Epic  (EMR- electronic medical record system)  Medicare Preventive Visit Patient Instructions  Thank you for completing your Welcome to Medicare Visit or Medicare Annual Wellness Visit today  Your next wellness visit will be due in one year (3/30/2023)  The screening/preventive services that you may require over the next 5-10 years are detailed below  Some tests may not apply to you based off risk factors and/or age  Screening tests ordered at today's visit but not completed yet may show as past due  Also, please note that scanned in results may not display below  Preventive Screenings:  Service Recommendations Previous Testing/Comments   Colorectal Cancer Screening  * Colonoscopy    * Fecal Occult Blood Test (FOBT)/Fecal Immunochemical Test (FIT)  * Fecal DNA/Cologuard Test  * Flexible Sigmoidoscopy Age: 54-65 years old   Colonoscopy: every 10 years (may be performed more frequently if at higher risk)  OR  FOBT/FIT: every 1 year  OR  Cologuard: every 3 years  OR  Sigmoidoscopy: every 5 years  Screening may be recommended earlier than age 48 if at higher risk for colorectal cancer  Also, an individualized decision between you and your healthcare provider will decide whether screening between the ages of 74-80 would be appropriate  Colonoscopy: 12/14/2018  FOBT/FIT: Not on file  Cologuard: Not on file  Sigmoidoscopy: Not on file    Screening Current     Breast Cancer Screening Age: 36 years old  Frequency: every 1-2 years  Not required if history of left and right mastectomy Mammogram: Not on file        Cervical Cancer Screening Between the ages of 21-29, pap smear recommended once every 3 years  Between the ages of 33-67, can perform pap smear with HPV co-testing every 5 years     Recommendations may differ for women with a history of total hysterectomy, cervical cancer, or abnormal pap smears in past  Pap Smear: 05/12/2021    Screening Current   Hepatitis C Screening Once for adults born between 1945 and 1965  More frequently in patients at high risk for Hepatitis C Hep C Antibody: Not on file        Diabetes Screening 1-2 times per year if you're at risk for diabetes or have pre-diabetes Fasting glucose: 85 mg/dL   A1C: 5 7    Screening Not Indicated  History Diabetes   Cholesterol Screening Once every 5 years if you don't have a lipid disorder  May order more often based on risk factors  Lipid panel: 12/13/2021    Screening Not Indicated  History Lipid Disorder     Other Preventive Screenings Covered by Medicare:  1  Abdominal Aortic Aneurysm (AAA) Screening: covered once if your at risk  You're considered to be at risk if you have a family history of AAA  2  Lung Cancer Screening: covers low dose CT scan once per year if you meet all of the following conditions: (1) Age 50-69; (2) No signs or symptoms of lung cancer; (3) Current smoker or have quit smoking within the last 15 years; (4) You have a tobacco smoking history of at least 30 pack years (packs per day multiplied by number of years you smoked); (5) You get a written order from a healthcare provider  3  Glaucoma Screening: covered annually if you're considered high risk: (1) You have diabetes OR (2) Family history of glaucoma OR (3)  aged 48 and older OR (3)  American aged 72 and older  3  Osteoporosis Screening: covered every 2 years if you meet one of the following conditions: (1) You're estrogen deficient and at risk for osteoporosis based off medical history and other findings; (2) Have a vertebral abnormality; (3) On glucocorticoid therapy for more than 3 months; (4) Have primary hyperparathyroidism; (5) On osteoporosis medications and need to assess response to drug therapy  · Last bone density test (DXA Scan): Not on file  5  HIV Screening: covered annually if you're between the age of 12-76   Also covered annually if you are younger than 13 and older than 65 with risk factors for HIV infection  For pregnant patients, it is covered up to 3 times per pregnancy  Immunizations:  Immunization Recommendations   Influenza Vaccine Annual influenza vaccination during flu season is recommended for all persons aged >= 6 months who do not have contraindications   Pneumococcal Vaccine (Prevnar and Pneumovax)  * Prevnar = PCV13  * Pneumovax = PPSV23   Adults 25-60 years old: 1-3 doses may be recommended based on certain risk factors  Adults 72 years old: Prevnar (PCV13) vaccine recommended followed by Pneumovax (PPSV23) vaccine  If already received PPSV23 since turning 65, then PCV13 recommended at least one year after PPSV23 dose  Hepatitis B Vaccine 3 dose series if at intermediate or high risk (ex: diabetes, end stage renal disease, liver disease)   Tetanus (Td) Vaccine - COST NOT COVERED BY MEDICARE PART B Following completion of primary series, a booster dose should be given every 10 years to maintain immunity against tetanus  Td may also be given as tetanus wound prophylaxis  Tdap Vaccine - COST NOT COVERED BY MEDICARE PART B Recommended at least once for all adults  For pregnant patients, recommended with each pregnancy  Shingles Vaccine (Shingrix) - COST NOT COVERED BY MEDICARE PART B  2 shot series recommended in those aged 48 and above     Health Maintenance Due:      Topic Date Due    Hepatitis C Screening  Never done    HIV Screening  Never done    Cervical Cancer Screening  05/12/2024    Colorectal Cancer Screening  12/14/2028     Immunizations Due:      Topic Date Due    COVID-19 Vaccine (1) Never done    DTaP,Tdap,and Td Vaccines (1 - Tdap) Never done     Advance Directives   What are advance directives? Advance directives are legal documents that state your wishes and plans for medical care  These plans are made ahead of time in case you lose your ability to make decisions for yourself   Advance directives can apply to any medical decision, such as the treatments you want, and if you want to donate organs  What are the types of advance directives? There are many types of advance directives, and each state has rules about how to use them  You may choose a combination of any of the following:  · Living will: This is a written record of the treatment you want  You can also choose which treatments you do not want, which to limit, and which to stop at a certain time  This includes surgery, medicine, IV fluid, and tube feedings  · Durable power of  for healthcare [facility]): This is a written record that states who you want to make healthcare choices for you when you are unable to make them for yourself  This person, called a proxy, is usually a family member or a friend  You may choose more than 1 proxy  · Do not resuscitate (DNR) order:  A DNR order is used in case your heart stops beating or you stop breathing  It is a request not to have certain forms of treatment, such as CPR  A DNR order may be included in other types of advance directives  · Medical directive: This covers the care that you want if you are in a coma, near death, or unable to make decisions for yourself  You can list the treatments you want for each condition  Treatment may include pain medicine, surgery, blood transfusions, dialysis, IV or tube feedings, and a ventilator (breathing machine)  · Values history: This document has questions about your views, beliefs, and how you feel and think about life  This information can help others choose the care that you would choose  Why are advance directives important? An advance directive helps you control your care  Although spoken wishes may be used, it is better to have your wishes written down  Spoken wishes can be misunderstood, or not followed  Treatments may be given even if you do not want them  An advance directive may make it easier for your family to make difficult choices about your care     Weight Management   Why it is important to manage your weight:  Being overweight increases your risk of health conditions such as heart disease, high blood pressure, type 2 diabetes, and certain types of cancer  It can also increase your risk for osteoarthritis, sleep apnea, and other respiratory problems  Aim for a slow, steady weight loss  Even a small amount of weight loss can lower your risk of health problems  How to lose weight safely:  A safe and healthy way to lose weight is to eat fewer calories and get regular exercise  You can lose up about 1 pound a week by decreasing the number of calories you eat by 500 calories each day  Healthy meal plan for weight management:  A healthy meal plan includes a variety of foods, contains fewer calories, and helps you stay healthy  A healthy meal plan includes the following:  · Eat whole-grain foods more often  A healthy meal plan should contain fiber  Fiber is the part of grains, fruits, and vegetables that is not broken down by your body  Whole-grain foods are healthy and provide extra fiber in your diet  Some examples of whole-grain foods are whole-wheat breads and pastas, oatmeal, brown rice, and bulgur  · Eat a variety of vegetables every day  Include dark, leafy greens such as spinach, kale, richard greens, and mustard greens  Eat yellow and orange vegetables such as carrots, sweet potatoes, and winter squash  · Eat a variety of fruits every day  Choose fresh or canned fruit (canned in its own juice or light syrup) instead of juice  Fruit juice has very little or no fiber  · Eat low-fat dairy foods  Drink fat-free (skim) milk or 1% milk  Eat fat-free yogurt and low-fat cottage cheese  Try low-fat cheeses such as mozzarella and other reduced-fat cheeses  · Choose meat and other protein foods that are low in fat  Choose beans or other legumes such as split peas or lentils  Choose fish, skinless poultry (chicken or turkey), or lean cuts of red meat (beef or pork)   Before you cook meat or poultry, cut off any visible fat  · Use less fat and oil  Try baking foods instead of frying them  Add less fat, such as margarine, sour cream, regular salad dressing and mayonnaise to foods  Eat fewer high-fat foods  Some examples of high-fat foods include french fries, doughnuts, ice cream, and cakes  · Eat fewer sweets  Limit foods and drinks that are high in sugar  This includes candy, cookies, regular soda, and sweetened drinks  Exercise:  Exercise at least 30 minutes per day on most days of the week  Some examples of exercise include walking, biking, dancing, and swimming  You can also fit in more physical activity by taking the stairs instead of the elevator or parking farther away from stores  Ask your healthcare provider about the best exercise plan for you  Narcotic (Opioid) Safety    Use narcotics safely:  · Take prescribed narcotics exactly as directed  · Do not give narcotics to others or take narcotics that belong to someone else  · Do not mix narcotics without medicines or alcohol  · Do not drive or operate heavy machinery after you take the narcotic  · Monitor for side effects and notify your healthcare provider if you experienced side effects such as nausea, sleepiness, itching, or trouble thinking clearly  Manage constipation:    Constipation is the most common side effect of narcotic medicine  Constipation is when you have hard, dry bowel movements, or you go longer than usual between bowel movements  Tell your healthcare provider about all changes in your bowel movements while you are taking narcotics  He or she may recommend laxative medicine to help you have a bowel movement  He or she may also change the kind of narcotic you are taking, or change when you take it  The following are more ways you can prevent or relieve constipation:    · Drink liquids as directed  You may need to drink extra liquids to help soften and move your bowels   Ask how much liquid to drink each day and which liquids are best for you  · Eat high-fiber foods  This may help decrease constipation by adding bulk to your bowel movements  High-fiber foods include fruits, vegetables, whole-grain breads and cereals, and beans  Your healthcare provider or dietitian can help you create a high-fiber meal plan  Your provider may also recommend a fiber supplement if you cannot get enough fiber from food  · Exercise regularly  Regular physical activity can help stimulate your intestines  Walking is a good exercise to prevent or relieve constipation  Ask which exercises are best for you  · Schedule a time each day to have a bowel movement  This may help train your body to have regular bowel movements  Bend forward while you are on the toilet to help move the bowel movement out  Sit on the toilet for at least 10 minutes, even if you do not have a bowel movement  Store narcotics safely:   · Store narcotics where others cannot easily get them  Keep them in a locked cabinet or secure area  Do not  keep them in a purse or other bag you carry with you  A person may be looking for something else and find the narcotics  · Make sure narcotics are stored out of the reach of children  A child can easily overdose on narcotics  Narcotics may look like candy to a small child  The best way to dispose of narcotics: The laws vary by country and area  In the [place], the best way is to return the narcotics through a take-back program  This program is offered by the [employer])  The following are options for using the program:  · Take the narcotics to a BRITANY collection site  The site is often a law enforcement center  Call your local law enforcement center for scheduled take-back days in your area  You will be given information on where to go if the collection site is in a different location    · Take the narcotics to an approved pharmacy or hospital   A pharmacy or hospital may be set up as a collection site  You will need to ask if it is a BRITANY collection site if you were not directed there  A pharmacy or doctor's office may not be able to take back narcotics unless it is a BRITANY site  · Use a mail-back system  This means you are given containers to put the narcotics into  You will then mail them in the containers  · Use a take-back drop box  This is a place to leave the narcotics at any time  People and animals will not be able to get into the box  Your local law enforcement agency can tell you where to find a drop box in your area  Other ways to manage pain:   · Ask your healthcare provider about non-narcotic medicines to control pain  Nonprescription medicines include NSAIDs (such as ibuprofen) and acetaminophen  Prescription medicines include muscle relaxers, antidepressants, and steroids  · Pain may be managed without any medicines  Some ways to relieve pain include massage, aromatherapy, or meditation  Physical or occupational therapy may also help  For more information:   · Drug Enforcement Administration  [address] [place] [address] [address]  Phone: 1- 818 - 618-8640  Web Address: [facility]/drug_disposal/    · [address] and Drug Administration  [place]  [place] [address]  Phone: 7- 929 - 379-5938  Web Address: http://[employer]/     © Copyright [employer] 2018 Information is for End User's use only and may not be sold, redistributed or otherwise used for commercial purposes   All illustrations and images included in CareNotes® are the copyrighted property of A D A M , Inc  or [address] [place] [address] [employer]

## 2022-03-29 NOTE — LETTER
March 29, 2022     Patient: Karyn Lee   YOB: 1970   Date of Visit: 3/29/2022       To Whom it May Concern:    Karyn Lee is under my professional care  Patient has known past medical history of gastroparesis, GERD, constipation, ADHD, type 2 diabetes mellitus, cataplexy, Wegener's granulomatosis, small fiber neuropathy, bipolar 1 disorder  Patient is wheel chair bound  Due to patient's medical conditions, patient requires to have 24 hours a day/7 days a week home health services  Patient cannot perform any activities of daily living on her own including dressing, bathing, cooking  Patient requires assistance to transfer in and out of her bed and chairs  If you have any questions or concerns, please don't hesitate to call           Sincerely,          Katarzyna Thrasher PA-C        CC: No Recipients

## 2022-03-29 NOTE — ASSESSMENT & PLAN NOTE
- Patient had previously tried gabapentin, but it caused restless legs syndrome  Patient recently tried Cymbalta, but it caused side effect of dizziness, abdominal pain, vomiting   - Patient is now prescribed Lyrica 50 mg twice daily and is now willing to start taking it consistently  Patient aware to start taking lyrica 50 mg daily at bedtime for one week then increase to twice daily

## 2022-03-29 NOTE — ASSESSMENT & PLAN NOTE
- Symptoms currently stable  - Continue Protonix, Carafate   - Continue follow-up with [facility] gastroenterology as scheduled  - Continue follow-up with [facility]  - Patient was evaluated by surgical team during recent hospitalization who notes outpatient consideration for subtotal gastrectomy with gastrojejunostomy tube

## 2022-03-30 ENCOUNTER — TELEPHONE (OUTPATIENT)
Dept: ADMINISTRATIVE | Facility: OTHER | Age: 52
End: 2022-03-30

## 2022-03-30 ENCOUNTER — TELEPHONE (OUTPATIENT)
Dept: FAMILY MEDICINE CLINIC | Facility: CLINIC | Age: 52
End: 2022-03-30

## 2022-03-30 NOTE — TELEPHONE ENCOUNTER
Upon review of the In Basket request we were able to locate, review, and update the patient chart as requested for Hepatitis C   Any additional questions or concerns should be emailed to the Practice Liaisons via [email or web address] email, please do not reply via In Basket      Thank you  Valentin Ventura

## 2022-03-30 NOTE — TELEPHONE ENCOUNTER
----- Message from Cuong Lock PA-C sent at 3/29/2022  3:51 PM EDT -----  Regarding: hep C Screening  03/29/22 3:51 PM    Hello, our patient Ash Score has had Hepatitis C completed/performed  Please assist in updating the patient chart by pulling the Care Everywhere (CE) document  The date of service is 6/16/2016       Thank you,  Katarzyna Thrasher PA-C   [employer] [employer]

## 2022-03-30 NOTE — TELEPHONE ENCOUNTER
PCP SIGNATURE NEEDED FOR [employer] FORM RECEIVED VIA FAX AND PLACED IN PCP FOLDER TO BE DELIVERED AT ASSIGNED TIMES        Order # [id number]

## 2022-03-31 ENCOUNTER — HOSPITAL ENCOUNTER (OUTPATIENT)
Dept: RADIOLOGY | Facility: MEDICAL CENTER | Age: 52
Discharge: HOME | End: 2022-03-31
Attending: PHYSICAL MEDICINE & REHABILITATION | Admitting: PHYSICAL MEDICINE & REHABILITATION
Payer: MEDICARE

## 2022-03-31 VITALS
HEART RATE: 48 BPM | DIASTOLIC BLOOD PRESSURE: 82 MMHG | OXYGEN SATURATION: 98 % | SYSTOLIC BLOOD PRESSURE: 134 MMHG | RESPIRATION RATE: 20 BRPM | TEMPERATURE: 97.6 F

## 2022-03-31 DIAGNOSIS — M46.1 SACROILIITIS (HCC): ICD-10-CM

## 2022-03-31 PROCEDURE — 27096 INJECT SACROILIAC JOINT: CPT | Performed by: PHYSICAL MEDICINE & REHABILITATION

## 2022-03-31 RX ORDER — LIDOCAINE HYDROCHLORIDE 10 MG/ML
5 INJECTION, SOLUTION EPIDURAL; INFILTRATION; INTRACAUDAL; PERINEURAL ONCE
Status: COMPLETED | OUTPATIENT
Start: 2022-03-31 | End: 2022-03-31

## 2022-03-31 RX ORDER — METHYLPREDNISOLONE ACETATE 80 MG/ML
80 INJECTION, SUSPENSION INTRA-ARTICULAR; INTRALESIONAL; INTRAMUSCULAR; PARENTERAL; SOFT TISSUE ONCE
Status: COMPLETED | OUTPATIENT
Start: 2022-03-31 | End: 2022-03-31

## 2022-03-31 RX ORDER — BUPIVACAINE HCL/PF 2.5 MG/ML
10 VIAL (ML) INJECTION ONCE
Status: COMPLETED | OUTPATIENT
Start: 2022-03-31 | End: 2022-03-31

## 2022-03-31 RX ADMIN — IOHEXOL 2 ML: 300 INJECTION, SOLUTION INTRAVENOUS at 09:53

## 2022-03-31 RX ADMIN — METHYLPREDNISOLONE ACETATE 80 MG: 80 INJECTION, SUSPENSION INTRA-ARTICULAR; INTRALESIONAL; INTRAMUSCULAR; PARENTERAL; SOFT TISSUE at 09:53

## 2022-03-31 RX ADMIN — Medication 6 ML: at 09:53

## 2022-03-31 RX ADMIN — LIDOCAINE HYDROCHLORIDE 4 ML: 10 INJECTION, SOLUTION EPIDURAL; INFILTRATION; INTRACAUDAL; PERINEURAL at 09:52

## 2022-03-31 NOTE — DISCHARGE INSTRUCTIONS

## 2022-03-31 NOTE — H&P
History of Present Illness:  The patient is a 46 y o  female who presents with complaints of back pain    Patient Active Problem List   Diagnosis    Type 2 diabetes mellitus with stage 4 chronic kidney disease, with long-term current use of insulin ([address] )    Dyslipidemia    Granulomatosis with polyangiitis (HCC)    [facility]    Asthma    Benign essential HTN    Chronic right shoulder pain    Noncompliance    Bipolar 1 disorder ([facility])    Epigastric pain    Pancreatitis    Ovarian cyst    Postherpetic neuralgia    Anemia of chronic disease    Arthralgia of multiple joints    Cataplexy    Weakness of both lower extremities    Diarrhea of presumed infectious origin    Chronic pelvic pain in female    Plantar wart, right foot    Seasonal allergic rhinitis due to pollen    Gastroesophageal reflux disease    Weakness of both upper extremities    Persistent proteinuria    Left leg pain    Controlled substance agreement signed    Chronic narcotic use    Small fiber neuropathy    IBS (irritable bowel syndrome)    Leukocytosis    Gastroparesis    Hyperosmia    Smell disturbance    Contusion of left hip    Trochanteric bursitis of left hip    Neuropathy    Attention deficit hyperactivity disorder (ADHD)    Elevated blood pressure reading    Costochondritis    Hearing difficulty of both ears    Vaginal atrophy    Alpha-hemolytic Streptococcus positive urine culture    Nausea and vomiting    Smoking    Schizo-affective schizophrenia ([address] )    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[address] )    Stage 4 chronic kidney disease ([address] )    Cervical radiculopathy    Right arm numbness    Fall at home, initial encounter    Sinus bradycardia    Marijuana use    Difficulty ventilating with mask    Low back pain radiating to left lower extremity    Lump of skin of back    Cervical disc disorder with radiculopathy of mid-cervical region    Neck pain    Cervical spinal stenosis    Depression    Continuous opioid dependence (HCC)    Chronic kidney disease, stage 4 (severe) (HCC)    Chronic pain    COVID    PTSD (post-traumatic stress disorder)    Pain of finger of right hand       Past Medical History:   Diagnosis Date    ADHD     Anemia of chronic disease     Anxiety     Arthritis ?  Asthma     Bipolar disorder ([facility] )     Borderline personality disorder ([place] [address] )     Cataplexy     Chronic abdominal pain     Chronic kidney disease ?  CKD (chronic kidney disease) stage 3, GFR 30-59 ml/min (HCC)     Cushing syndrome (HCC)     Depression ?  Diabetes mellitus ([place] [address] )     DVT (deep venous thrombosis) (HCC)     GERD (gastroesophageal reflux disease)     Headache(784 0) 3 months    History of acute pancreatitis     felt secondary to Bactrim    History of transfusion     Hypertension     Liver disease     fatty liver    Microscopic polyangiitis (HCC)     Ovarian cyst     PTSD (post-traumatic stress disorder)     Self-inflicted injury     self inflicted skin wounds    Wegener's granulomatosis with renal involvement ([place] [address] ) 2015       Past Surgical History:   Procedure Laterality Date    ESOPHAGOGASTRODUODENOSCOPY  09/11/2015    mild antral gastritis    GASTRIC STIMULATOR IMPLANT SURGERY  06/25/2020    [place] COLONOSCOPY FLX DX W/COLLJ SPEC WHEN PFRMD N/A 12/14/2018    adenoma removed from the transverse, hyperplastic polyp removed from the left colon    [place] ESOPHAGOGASTRODUODENOSCOPY TRANSORAL DIAGNOSTIC N/A 12/14/2018    gastritis and scant coffee-ground material   Biopsies negative for H  pylori    RELEASE SCAR CONTRACTURE / GRAFT REPAIRS OF HAND Bilateral     UPPER GASTROINTESTINAL ENDOSCOPY  12/26/2019    Dr Shaniqua Wallace   Botox to the pylorus         Current Outpatient Medications:     acetaminophen (TYLENOL) 500 mg tablet, Take 2 tablets (1,000 mg total) by mouth every 8 (eight) hours as needed for mild pain, Disp: 60 tablet, Rfl: 1    Alcohol Swabs (Alcohol Pads) 70 % PADS, Use 4 (four) times a day, Disp: 400 each, Rfl: 2    amphetamine-dextroamphetamine (ADDERALL XR) 20 MG 24 hr capsule, Take 1 capsule (20 mg total) by mouth 2 (two) times a day Max Daily Amount: 40 mg, Disp: 60 capsule, Rfl: 0    Blood Glucose Monitoring Suppl (FreeStyle Lite) DEIRDRE, Use daily, Disp: 1 each, Rfl: 0    buPROPion (WELLBUTRIN XL) 300 mg 24 hr tablet, Take 1 tablet (300 mg total) by mouth every morning, Disp: 30 tablet, Rfl: 2    cholecalciferol (VITAMIN D3) 1,000 units tablet, Take 2 tablets (2,000 Units total) by mouth daily for 20 days, Disp: 40 tablet, Rfl: 0    clobetasol (TEMOVATE) 0 05 % cream, Apply topically 2 (two) times a day, Disp: 60 g, Rfl: 1    Easy Comfort Lancets MISC, USE TO TEST THE BLOOD SUGAR THREE TIMES A DAY, Disp: 300 each, Rfl: 10    glucose blood (FREESTYLE LITE) test strip, Use as instructed, Disp: 100 strip, Rfl: 0    hydrOXYzine HCL (ATARAX) 25 mg tablet, , Disp: , Rfl:     insulin aspart (NovoLOG FlexPen) 100 UNIT/ML injection pen, Inject 6 Units under the skin 3 (three) times a day with meals, Disp: 15 mL, Rfl: 0    insulin glargine (Toujeo Max SoloStar) 300 units/mL CONCENTRATED U-300 injection pen (2-unit dial), Inject 22 Units under the skin daily, Disp: 9 mL, Rfl: 0    Insulin Pen Needle (Pen Needles) 31G X 8 MM MISC, Inject under the skin 4 (four) times a day (with meals and at bedtime), Disp: 100 each, Rfl: 11    lidocaine (XYLOCAINE) 5 % ointment, APPLY TOPICALLY 2GM TWICE A DAY TO AFFECTED AREAS AS NEEDED FOR MILD PAIN, Disp: 250 g, Rfl: 8    Lidocaine Viscous HCl (XYLOCAINE) 2 % mucosal solution, Swish and spit 15 mL 4 (four) times a day as needed for mouth pain or discomfort, Disp: 15 mL, Rfl: 1    Linzess 72 MCG CAPS, TAKE ONE CAPSULE BY MOUTH ONCE DAILY, Disp: 90 capsule, Rfl: 10    ondansetron (ZOFRAN) 8 mg tablet, Take 1 tablet (8 mg total) by mouth every 8 (eight) hours as needed for nausea or vomiting, Disp: 30 tablet, Rfl: 2    oxyCODONE (ROXICODONE) 10 MG TABS, Take 1 tablet (10 mg total) by mouth every 4 (four) hours as needed for moderate pain Max Daily Amount: 60 mg, Disp: 180 tablet, Rfl: 0    pantoprazole (PROTONIX) 40 mg tablet, Take 1 tablet (40 mg total) by mouth 2 (two) times a day before meals, Disp: 180 tablet, Rfl: 1    pregabalin (LYRICA) 50 mg capsule, Take 1 capsule (50 mg total) by mouth 2 (two) times a day, Disp: 60 capsule, Rfl: 1    Probiotic Product (PROBIOTIC-10 PO), Take 1 tablet by mouth daily, Disp: , Rfl:     promethazine (PHENERGAN) 25 mg tablet, Take 1 tablet (25 mg total) by mouth every 6 (six) hours as needed for nausea or vomiting, Disp: 60 tablet, Rfl: 3    Restasis 0 05 % ophthalmic emulsion, , Disp: , Rfl:     scopolamine (TRANSDERM-SCOP) 1 5 mg/3 days TD 72 hr patch, Place 1 patch on the skin every third day, Disp: 10 patch, Rfl: 0    sucralfate (CARAFATE) 1 g/10 mL suspension, TAKE 10ML BY MOUTH THREE TIMES A DAY, Disp: 500 mL, Rfl: 3    Current Facility-Administered Medications:     bupivacaine (PF) (MARCAINE) 0 25 % injection 10 mL, 10 mL, Intra-articular, Once, Radha Grider DO    iohexol (OMNIPAQUE) 300 mg/mL injection 50 mL, 50 mL, Intra-articular, Once, Radha Grider DO    lidocaine (PF) (XYLOCAINE-MPF) 1 % injection 5 mL, 5 mL, Infiltration, Once, Radha Grider DO    methylPREDNISolone acetate (DEPO-MEDROL) injection 80 mg, 80 mg, Intra-articular, Once, Radha Grider, DO    Allergies   Allergen Reactions    Prozac [Fluoxetine Hcl]      SI    Bactrim [Sulfamethoxazole-Trimethoprim]      Pt "They think that is what cause the pancreatitis"     Flagyl [Metronidazole] Diarrhea and Abdominal Pain    Lamictal [Lamotrigine] GI Intolerance    Lithium Other (See Comments)    Haldol [Haloperidol] Other (See Comments)     "I don't like it"    Ibuprofen     Lexapro [Escitalopram Oxalate] Rash    Navane [Thiothixene]      SI    Other      "novaine?" antipsychotic       Physical Exam: There were no vitals filed for this visit  General: Awake, Alert, Oriented x 3, Mood and affect appropriate  Respiratory: Respirations even and unlabored  Cardiovascular: Peripheral pulses intact; no edema  Musculoskeletal Exam: tenderness to palpation bilateral lumbar paraspinals and distal to PSIS    ASA Score: 2       Assessment:   1   Sacroiliitis (HCC)        Plan: B/L SIJ injections with fluoroscopy

## 2022-04-06 ENCOUNTER — OFFICE VISIT (OUTPATIENT)
Dept: OBGYN CLINIC | Facility: CLINIC | Age: 52
End: 2022-04-06
Payer: MEDICARE

## 2022-04-06 VITALS
BODY MASS INDEX: 26.31 KG/M2 | SYSTOLIC BLOOD PRESSURE: 132 MMHG | HEIGHT: 62 IN | DIASTOLIC BLOOD PRESSURE: 80 MMHG | WEIGHT: 143 LBS

## 2022-04-06 DIAGNOSIS — M79.644 PAIN OF RIGHT MIDDLE FINGER: Primary | ICD-10-CM

## 2022-04-06 PROCEDURE — 73140 X-RAY EXAM OF FINGER(S): CPT | Performed by: PHYSICIAN ASSISTANT

## 2022-04-06 PROCEDURE — 99214 OFFICE O/P EST MOD 30 MIN: CPT | Performed by: PHYSICIAN ASSISTANT

## 2022-04-06 NOTE — PROGRESS NOTES
Patient Name:  Edy Ramirez  MRN:  [id number]    Assessment & Plan     Right long finger pain  1  Offered referral to physical therapy  Patient declined  2  Prescription for Voltaren gel  3  Activities as tolerated with modification to avoid possible inciting incidents  4  Follow-up as needed  Briefly discussed ultrasound if symptoms persist     Chief Complaint     Right long finger pain    History of the Present Illness     Edy Ramirez is a 46 y o  right-hand-dominant female who reports to the office today for evaluation of her right long finger  She notes an onset of pain approximately three weeks ago  She denies any specific injury or trauma  Pain is localized primarily to the PIP joint of the finger  Pain occurs intermittently without any specific activity or movement  She denies any swelling stiffness weakness or instability  No numbness or tingling  No fevers or chills  She currently takes nothing for the pain  She denies any clicking locking or triggering of the digit  Physical Exam     /80   Ht 5' 2" (1 575 m)   Wt 64 9 kg (143 lb)   LMP  (LMP Unknown)   BMI 26 16 kg/m²     Right long finger:  No gross deformity  Skin intact  No tenderness to palpation about the A1 pulley of the digit  No tenderness to palpation MCP PIP and DIP joints  No tenderness palpation proximal middle and distal phalanges  Full digital range of motion without pain  No gross instability about the MCP, PIP, and DIP joints without pain  Sensation is intact distally  Brisk capillary refill  Eyes: Anicteric sclerae  ENT: Trachea midline  Lungs: Normal respiratory effort  CV: Capillary refill is less than 2 seconds  Skin: Intact without erythema  Lymph: No palpable lymphadenopathy  Neuro: Sensation is grossly intact to light touch  Psych: Mood and affect are appropriate      Data Review     I have personally reviewed pertinent films in PACS, and my interpretation follows:    X-rays right long finger 4/6/22: No acute osseous abnormalities  No fracture or dislocation noted  No significant degenerative changes  Past Medical History:   Diagnosis Date    ADHD     Anemia of chronic disease     Anxiety     Arthritis ?  Asthma     Bipolar disorder ([facility] )     Borderline personality disorder ([facility] )     Cataplexy     Chronic abdominal pain     Chronic kidney disease ?  CKD (chronic kidney disease) stage 3, GFR 30-59 ml/min (HCC)     Cushing syndrome (HCC)     Depression ?  Diabetes mellitus ([facility] )     DVT (deep venous thrombosis) (HCC)     GERD (gastroesophageal reflux disease)     Headache(784 0) 3 months    History of acute pancreatitis     felt secondary to Bactrim    History of transfusion     Hypertension     Liver disease     fatty liver    Microscopic polyangiitis (HCC)     Ovarian cyst     PTSD (post-traumatic stress disorder)     Self-inflicted injury     self inflicted skin wounds    Wegener's granulomatosis with renal involvement ([place] [address] ) 2015       Past Surgical History:   Procedure Laterality Date    ESOPHAGOGASTRODUODENOSCOPY  09/11/2015    mild antral gastritis    GASTRIC STIMULATOR IMPLANT SURGERY  06/25/2020    [place] COLONOSCOPY FLX DX W/COLLJ SPEC WHEN PFRMD N/A 12/14/2018    adenoma removed from the transverse, hyperplastic polyp removed from the left colon    [place] ESOPHAGOGASTRODUODENOSCOPY TRANSORAL DIAGNOSTIC N/A 12/14/2018    gastritis and scant coffee-ground material   Biopsies negative for H  pylori    RELEASE SCAR CONTRACTURE / GRAFT REPAIRS OF HAND Bilateral     UPPER GASTROINTESTINAL ENDOSCOPY  12/26/2019    Dr Chantal Guadarrama   Botox to the pylorus       Allergies   Allergen Reactions    Prozac [Fluoxetine Hcl]      SI    Bactrim [Sulfamethoxazole-Trimethoprim]      Pt "They think that is what cause the pancreatitis"     Flagyl [Metronidazole] Diarrhea and Abdominal Pain    Lamictal [Lamotrigine] GI Intolerance    Lithium Other (See Comments)    Haldol [Haloperidol] Other (See Comments)     "I don't like it"    Ibuprofen     Lexapro [Escitalopram Oxalate] Rash    Navane [Thiothixene]      SI    Other      "novaine?" antipsychotic       Current Outpatient Medications on File Prior to Visit   Medication Sig Dispense Refill    acetaminophen (TYLENOL) 500 mg tablet Take 2 tablets (1,000 mg total) by mouth every 8 (eight) hours as needed for mild pain 60 tablet 1    Alcohol Swabs (Alcohol Pads) 70 % PADS Use 4 (four) times a day 400 each 2    amphetamine-dextroamphetamine (ADDERALL XR) 20 MG 24 hr capsule Take 1 capsule (20 mg total) by mouth 2 (two) times a day Max Daily Amount: 40 mg 60 capsule 0    Blood Glucose Monitoring Suppl (FreeStyle Lite) DEIRDRE Use daily 1 each 0    buPROPion (WELLBUTRIN XL) 300 mg 24 hr tablet Take 1 tablet (300 mg total) by mouth every morning 30 tablet 2    clobetasol (TEMOVATE) 0 05 % cream Apply topically 2 (two) times a day 60 g 1    Easy Comfort Lancets MISC USE TO TEST THE BLOOD SUGAR THREE TIMES A  each 10    glucose blood (FREESTYLE LITE) test strip Use as instructed 100 strip 0    hydrOXYzine HCL (ATARAX) 25 mg tablet       insulin aspart (NovoLOG FlexPen) 100 UNIT/ML injection pen Inject 6 Units under the skin 3 (three) times a day with meals 15 mL 0    insulin glargine (Toujeo Max SoloStar) 300 units/mL CONCENTRATED U-300 injection pen (2-unit dial) Inject 22 Units under the skin daily 9 mL 0    Insulin Pen Needle (Pen Needles) 31G X 8 MM MISC Inject under the skin 4 (four) times a day (with meals and at bedtime) 100 each 11    lidocaine (XYLOCAINE) 5 % ointment APPLY TOPICALLY 2GM TWICE A DAY TO AFFECTED AREAS AS NEEDED FOR MILD PAIN 250 g 8    Lidocaine Viscous HCl (XYLOCAINE) 2 % mucosal solution Swish and spit 15 mL 4 (four) times a day as needed for mouth pain or discomfort 15 mL 1    Linzess 72 MCG CAPS TAKE ONE CAPSULE BY MOUTH ONCE DAILY 90 capsule 10    ondansetron (ZOFRAN) 8 mg tablet Take 1 tablet (8 mg total) by mouth every 8 (eight) hours as needed for nausea or vomiting 30 tablet 2    oxyCODONE (ROXICODONE) 10 MG TABS Take 1 tablet (10 mg total) by mouth every 4 (four) hours as needed for moderate pain Max Daily Amount: 60 mg 180 tablet 0    pantoprazole (PROTONIX) 40 mg tablet Take 1 tablet (40 mg total) by mouth 2 (two) times a day before meals 180 tablet 1    pregabalin (LYRICA) 50 mg capsule Take 1 capsule (50 mg total) by mouth 2 (two) times a day 60 capsule 1    Probiotic Product (PROBIOTIC-10 PO) Take 1 tablet by mouth daily      promethazine (PHENERGAN) 25 mg tablet Take 1 tablet (25 mg total) by mouth every 6 (six) hours as needed for nausea or vomiting 60 tablet 3    Restasis 0 05 % ophthalmic emulsion       scopolamine (TRANSDERM-SCOP) 1 5 mg/3 days TD 72 hr patch Place 1 patch on the skin every third day 10 patch 0    sucralfate (CARAFATE) 1 g/10 mL suspension TAKE 10ML BY MOUTH THREE TIMES A  mL 3    cholecalciferol (VITAMIN D3) 1,000 units tablet Take 2 tablets (2,000 Units total) by mouth daily for 20 days 40 tablet 0     No current facility-administered medications on file prior to visit         Social History     Tobacco Use    Smoking status: Former Smoker     Packs/day: 1 00     Years: 10 00     Pack years: 10 00     Types: Cigarettes     Quit date: 2011     Years since quittin 2    Smokeless tobacco: Never Used    Tobacco comment: Stopped smoking 11 years ago   Vaping Use    Vaping Use: Never used   Substance Use Topics    Alcohol use: Never    Drug use: Yes     Types: Marijuana     Comment: marijuana daily       Family History   Problem Relation Age of Onset    Arthritis Mother     Depression Mother     Diabetes Mother     Mental illness Mother    Epi Pablo Migraines Mother     No Known Problems Father     Colon cancer Neg Hx     Drug abuse Neg Hx         mother father    Mental illness Neg Hx         disorder, mother father    Cancer Neg Hx     Breast cancer Neg Hx        Review of Systems     As stated in the HPI  All other systems reviewed and are negative

## 2022-04-06 NOTE — TELEPHONE ENCOUNTER
04/06/22    [employer] called asking for Updates on form / order # [id number]    Informe Alan the timing that it takes for forms to be completed (5 to 7 business Day) and when completed it will be fax over  Alan stated that they will contact office after the 5 to 7 business Days to ask for Updates on the Form

## 2022-04-14 ENCOUNTER — TELEPHONE (OUTPATIENT)
Dept: FAMILY MEDICINE CLINIC | Facility: CLINIC | Age: 52
End: 2022-04-14

## 2022-04-14 NOTE — TELEPHONE ENCOUNTER
[employer] form received on 04/14/2022  to be completed by PCP  Copy made and placed in PCP folder  Forms to be delivered to PCP mailbox at assigned time      Order #: [id number]

## 2022-04-17 DIAGNOSIS — G89.4 CHRONIC PAIN SYNDROME: ICD-10-CM

## 2022-04-17 DIAGNOSIS — F31.9 BIPOLAR 1 DISORDER (HCC): ICD-10-CM

## 2022-04-17 DIAGNOSIS — M31.31 GRANULOMATOSIS WITH POLYANGIITIS WITH RENAL INVOLVEMENT (HCC): Chronic | ICD-10-CM

## 2022-04-18 ENCOUNTER — CONSULT (OUTPATIENT)
Dept: SURGERY | Facility: CLINIC | Age: 52
End: 2022-04-18
Payer: MEDICARE

## 2022-04-18 VITALS
BODY MASS INDEX: 26.83 KG/M2 | HEART RATE: 58 BPM | DIASTOLIC BLOOD PRESSURE: 70 MMHG | SYSTOLIC BLOOD PRESSURE: 110 MMHG | WEIGHT: 145.8 LBS | HEIGHT: 62 IN | TEMPERATURE: 96.9 F

## 2022-04-18 DIAGNOSIS — R11.2 NAUSEA VOMITING AND DIARRHEA: ICD-10-CM

## 2022-04-18 DIAGNOSIS — R10.13 EPIGASTRIC PAIN: ICD-10-CM

## 2022-04-18 DIAGNOSIS — K21.9 GASTROESOPHAGEAL REFLUX DISEASE, UNSPECIFIED WHETHER ESOPHAGITIS PRESENT: ICD-10-CM

## 2022-04-18 DIAGNOSIS — R19.7 NAUSEA VOMITING AND DIARRHEA: ICD-10-CM

## 2022-04-18 DIAGNOSIS — K31.84 GASTROPARESIS: Primary | ICD-10-CM

## 2022-04-18 PROBLEM — N18.4 STAGE 4 CHRONIC KIDNEY DISEASE (HCC): Status: RESOLVED | Noted: 2021-11-27 | Resolved: 2022-04-18

## 2022-04-18 PROCEDURE — 99214 OFFICE O/P EST MOD 30 MIN: CPT | Performed by: SURGERY

## 2022-04-18 NOTE — PROGRESS NOTES
Assessment/Plan:   Bhupinder Nicholson is a 46 y  o female who comes in today for regarding her gastroparesis and recurrent abdominal pain with 15 lb weight loss    She has an interesting history is a 24-year-old disabled person with multiple medical issues and Justa's granulomatosis who has had 2 admissions over the last 4-6 months regarding abdominal nausea vomiting and epigastric pain  She also has loose stools  This is not a bowel obstruction but thought to be symptoms of her known gastroparesis  She has a history a year ago at [facility] having a gastric stimulator and a pyloroplasty placed  She is not able to follow-up [facility] because of transportation issues as she is disabled  She also has chronic kidney disease stage 4  Type 2 diabetes  Recent COVID  Granulomatosis   bipolar      Plan  Gastroparesis with some weight loss due to anorexia and challenges with eating and 2 admissions over the last 6 months  Am concerned regarding her symptomatology but she has not yet met criteria for a subtotal gastrectomy and gastric bypass  The surgeries also have their own side effects and complications including nutritional deficits and significant weight loss  If she continues to lose weight, or has multiple more admissions we might have reach the threshold to consider a subtotal gastrectomy/gastric bypass  There really is no need to remove the distal stomach if she has a full bypass Carolina-en-Y  A feeding tube would only be necessary if she is unable to maintain p o  Intake after the gastric bypass  Therefore the plan, if she continues weight loss, and hospital admissions, could include a Carolina-en-Y gastric bypass  For now will continue watchful waiting  Protein supplements, follow-up with GI  Perhaps they can contact [facility] to see if there is any additional management through the gastric stimulator      She was to moved to [place] but now is remaining in the area so we have recommended she follow-up with GI regularly  _  If she does return to surgery office I would recommend follow-up with Dr Naina Hough to consider performing a Carolina-en-Y bypass  _____________________________________________________  HPI:  Ry Jaimes is a 46 y  o female who comes in today for postoperative check after recent  on * No surgery found *  Currently doing well with some problems :  Occasional vomiting and 2 admissions to the hospital with some weight loss due to anorexia  , no fever or chills,    Some nausea and vomiting intermittently  She does very much watch her diet  She does have stage for kidney disease in other significant comorbid conditions  ROS:  General ROS: negative for - chills, fatigue, fever or night sweats, weight loss  Respiratory ROS: no cough, shortness of breath, or wheezing  Cardiovascular ROS: no chest pain or dyspnea on exertion  Genito-Urinary ROS: no dysuria, trouble voiding, or hematuria  Musculoskeletal ROS: negative for - gait disturbance, joint pain or muscle pain  Neurological ROS: no TIA or stroke symptoms  GI ROS: see HPI  Skin ROS: no new rashes or lesions   Lymphatic ROS: no new adenopathy noted by pt     GYN ROS: see HPI, no new GYN history or bleeding noted  Psy ROS: no new mental or behavioral disturbances         Patient Active Problem List   Diagnosis    Type 2 diabetes mellitus with stage 4 chronic kidney disease, with long-term current use of insulin (HCC)    Dyslipidemia    Granulomatosis with polyangiitis (HCC)    MPA (microscopic polyangiitis) (HCC)    Asthma    Benign essential HTN    Chronic right shoulder pain    Noncompliance    Bipolar 1 disorder (HCC)    Epigastric pain    Pancreatitis    Ovarian cyst    Postherpetic neuralgia    Anemia of chronic disease    Arthralgia of multiple joints    Cataplexy    Weakness of both lower extremities    Diarrhea of presumed infectious origin    Chronic pelvic pain in female    Plantar wart, right foot    Seasonal allergic rhinitis due to pollen    Gastroesophageal reflux disease    Weakness of both upper extremities    Persistent proteinuria    Left leg pain    Controlled substance agreement signed    Chronic narcotic use    Small fiber neuropathy    IBS (irritable bowel syndrome)    Leukocytosis    Gastroparesis    Hyperosmia    Smell disturbance    Contusion of left hip    Trochanteric bursitis of left hip    Neuropathy    Attention deficit hyperactivity disorder (ADHD)    Elevated blood pressure reading    Costochondritis    Hearing difficulty of both ears    Vaginal atrophy    Alpha-hemolytic Streptococcus positive urine culture    Nausea and vomiting    Smoking    Schizo-affective schizophrenia ([facility] [address] [facility]    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[facility] [address] [facility]    Stage 4 chronic kidney disease ([facility] [address] [facility]    Cervical radiculopathy    Right arm numbness    Fall at home, initial encounter    Sinus bradycardia    Marijuana use    Difficulty ventilating with mask    Low back pain radiating to left lower extremity    Lump of skin of back    Cervical disc disorder with radiculopathy of mid-cervical region    Neck pain    Cervical spinal stenosis    Depression    Continuous opioid dependence (HCC)    Chronic kidney disease, stage 4 (severe) ([facility])    Chronic pain    COVID    PTSD (post-traumatic stress disorder)    Pain of finger of right hand    Sacroiliitis ([facility])       Allergies:  Prozac [fluoxetine hcl], Bactrim [sulfamethoxazole-trimethoprim], Flagyl [metronidazole], Lamictal [lamotrigine], Lithium, Haldol [haloperidol], Ibuprofen, Lexapro [escitalopram oxalate], Navane [thiothixene], and Other      Current Outpatient Medications:     acetaminophen (TYLENOL) 500 mg tablet, Take 2 tablets (1,000 mg total) by mouth every 8 (eight) hours as needed for mild pain, Disp: 60 tablet, Rfl: 1    Alcohol Swabs (Alcohol Pads) 70 % PADS, Use 4 (four) times a day, Disp: 400 each, Rfl: 2    amphetamine-dextroamphetamine (ADDERALL XR) 20 MG 24 hr capsule, Take 1 capsule (20 mg total) by mouth 2 (two) times a day Max Daily Amount: 40 mg, Disp: 60 capsule, Rfl: 0    Blood Glucose Monitoring Suppl (FreeStyle Lite) DEIRDRE, Use daily, Disp: 1 each, Rfl: 0    buPROPion (WELLBUTRIN XL) 300 mg 24 hr tablet, Take 1 tablet (300 mg total) by mouth every morning, Disp: 30 tablet, Rfl: 2    cholecalciferol (VITAMIN D3) 1,000 units tablet, Take 2 tablets (2,000 Units total) by mouth daily for 20 days, Disp: 40 tablet, Rfl: 0    clobetasol (TEMOVATE) 0 05 % cream, Apply topically 2 (two) times a day, Disp: 60 g, Rfl: 1    Diclofenac Sodium (VOLTAREN) 1 %, Apply 2 g topically 4 (four) times a day, Disp: 100 g, Rfl: 0    Easy Comfort Lancets MISC, USE TO TEST THE BLOOD SUGAR THREE TIMES A DAY, Disp: 300 each, Rfl: 10    glucose blood (FREESTYLE LITE) test strip, Use as instructed, Disp: 100 strip, Rfl: 0    hydrOXYzine HCL (ATARAX) 25 mg tablet, , Disp: , Rfl:     insulin aspart (NovoLOG FlexPen) 100 UNIT/ML injection pen, Inject 6 Units under the skin 3 (three) times a day with meals, Disp: 15 mL, Rfl: 0    insulin glargine (Toujeo Max SoloStar) 300 units/mL CONCENTRATED U-300 injection pen (2-unit dial), Inject 22 Units under the skin daily, Disp: 9 mL, Rfl: 0    Insulin Pen Needle (Pen Needles) 31G X 8 MM MISC, Inject under the skin 4 (four) times a day (with meals and at bedtime), Disp: 100 each, Rfl: 11    lidocaine (XYLOCAINE) 5 % ointment, APPLY TOPICALLY 2GM TWICE A DAY TO AFFECTED AREAS AS NEEDED FOR MILD PAIN, Disp: 250 g, Rfl: 8    Lidocaine Viscous HCl (XYLOCAINE) 2 % mucosal solution, Swish and spit 15 mL 4 (four) times a day as needed for mouth pain or discomfort, Disp: 15 mL, Rfl: 1    Linzess 72 MCG CAPS, TAKE ONE CAPSULE BY MOUTH ONCE DAILY, Disp: 90 capsule, Rfl: 10    ondansetron (ZOFRAN) 8 mg tablet, Take 1 tablet (8 mg total) by mouth every 8 (eight) hours as needed for nausea or vomiting, Disp: 30 tablet, Rfl: 2   oxyCODONE (ROXICODONE) 10 MG TABS, Take 1 tablet (10 mg total) by mouth every 4 (four) hours as needed for moderate pain Max Daily Amount: 60 mg, Disp: 180 tablet, Rfl: 0    pantoprazole (PROTONIX) 40 mg tablet, Take 1 tablet (40 mg total) by mouth 2 (two) times a day before meals, Disp: 180 tablet, Rfl: 1    pregabalin (LYRICA) 50 mg capsule, Take 1 capsule (50 mg total) by mouth 2 (two) times a day, Disp: 60 capsule, Rfl: 1    Probiotic Product (PROBIOTIC-10 PO), Take 1 tablet by mouth daily, Disp: , Rfl:     promethazine (PHENERGAN) 25 mg tablet, Take 1 tablet (25 mg total) by mouth every 6 (six) hours as needed for nausea or vomiting, Disp: 60 tablet, Rfl: 3    Restasis 0 05 % ophthalmic emulsion, , Disp: , Rfl:     scopolamine (TRANSDERM-SCOP) 1 5 mg/3 days TD 72 hr patch, Place 1 patch on the skin every third day, Disp: 10 patch, Rfl: 0    sucralfate (CARAFATE) 1 g/10 mL suspension, TAKE 10ML BY MOUTH THREE TIMES A DAY, Disp: 500 mL, Rfl: 3    Past Medical History:   Diagnosis Date    ADHD     Anemia of chronic disease     Anxiety     Arthritis ?  Asthma     Bipolar disorder ([facility] [address] [facility]     Borderline personality disorder ([facility] [address] [facility]     Cataplexy     Chronic abdominal pain     Chronic kidney disease ?  CKD (chronic kidney disease) stage 3, GFR 30-59 ml/min (HCC)     Cushing syndrome (HCC)     Depression ?     Diabetes mellitus ([facility] [address] [facility]     DVT (deep venous thrombosis) (HCC)     GERD (gastroesophageal reflux disease)     Headache(784 0) 3 months    History of acute pancreatitis     felt secondary to Bactrim    History of transfusion     Hypertension     Liver disease     fatty liver    Microscopic polyangiitis (HCC)     Ovarian cyst     PTSD (post-traumatic stress disorder)     Self-inflicted injury     self inflicted skin wounds    Wegener's granulomatosis with renal involvement ([facility] [address] ) 2015       Past Surgical History:   Procedure Laterality Date    ESOPHAGOGASTRODUODENOSCOPY  09/11/2015 mild antral gastritis    GASTRIC STIMULATOR IMPLANT SURGERY  06/25/2020    ID COLONOSCOPY FLX DX W/COLLJ SPEC WHEN PFRMD N/A 12/14/2018    adenoma removed from the transverse, hyperplastic polyp removed from the left colon    ID ESOPHAGOGASTRODUODENOSCOPY TRANSORAL DIAGNOSTIC N/A 12/14/2018    gastritis and scant coffee-ground material   Biopsies negative for H  pylori    RELEASE SCAR CONTRACTURE / GRAFT REPAIRS OF HAND Bilateral     UPPER GASTROINTESTINAL ENDOSCOPY  12/26/2019    Dr Charity Blank  Botox to the pylorus       Family History   Problem Relation Age of Onset    Arthritis Mother     Depression Mother     Diabetes Mother     Mental illness Mother    Coyle Rafi Migraines Mother     No Known Problems Father     Colon cancer Neg Hx     Drug abuse Neg Hx         mother father    Mental illness Neg Hx         disorder, mother father    Cancer Neg Hx     Breast cancer Neg Hx         reports that she quit smoking about 11 years ago  Her smoking use included cigarettes  She has a 10 00 pack-year smoking history  She has never used smokeless tobacco  She reports current drug use  Drug: Marijuana  She reports that she does not drink alcohol  Vitals:    04/18/22 0933   BP: 110/70   Pulse: 58   Temp: (!) 96 9 °F (36 1 °C)       PHYSICAL EXAM  General: normal, cooperative, no distress  Abdominal: soft, nondistended or nontender  Incision: clean, dry, and intact and healing well  Lungs clear to auscultation  Heart normal sinus rhythm  Extremities without clubbing cyanosis or edema  Abdomen is benign soft and the gastric stimulator is palpable in the left upper quadrant  Neurologic exam grossly intact  Mental exam grossly intact alert and oriented  Skin somewhat sallow but without jaundice  Extremities moves all 4 extremities with reasonable neurological function  Does use a wheelchair  Some portions of this record may have been generated with voice recognition software   There may be translation, syntax,  or grammatical errors  Occasional wrong word or "sound-a-like" substitutions may have occurred due to the inherent limitations of the voice recognition software  Read the chart carefully and recognize, using context, where substitutions may have occurred  If you have any questions, please contact the dictating provider for clarification or correction, as needed  This encounter has been coded by a non-certified coder         Ulises Hilliard MD    Date: 4/18/2022 Time: 9:51 AM

## 2022-04-19 RX ORDER — DEXTROAMPHETAMINE SACCHARATE, AMPHETAMINE ASPARTATE MONOHYDRATE, DEXTROAMPHETAMINE SULFATE AND AMPHETAMINE SULFATE 5; 5; 5; 5 MG/1; MG/1; MG/1; MG/1
20 CAPSULE, EXTENDED RELEASE ORAL 2 TIMES DAILY
Qty: 60 CAPSULE | Refills: 0 | Status: SHIPPED | OUTPATIENT
Start: 2022-04-19 | End: 2022-05-15 | Stop reason: SDUPTHER

## 2022-04-19 RX ORDER — OXYCODONE HYDROCHLORIDE 10 MG/1
10 TABLET ORAL EVERY 4 HOURS PRN
Qty: 180 TABLET | Refills: 0 | Status: SHIPPED | OUTPATIENT
Start: 2022-04-19 | End: 2022-05-15 | Stop reason: SDUPTHER

## 2022-04-25 ENCOUNTER — TELEPHONE (OUTPATIENT)
Dept: FAMILY MEDICINE CLINIC | Facility: CLINIC | Age: 52
End: 2022-04-25

## 2022-04-26 DIAGNOSIS — N18.30 CKD (CHRONIC KIDNEY DISEASE) STAGE 3, GFR 30-59 ML/MIN (HCC): ICD-10-CM

## 2022-04-27 RX ORDER — AMLODIPINE BESYLATE 5 MG/1
TABLET ORAL
Qty: 90 TABLET | Refills: 1 | Status: SHIPPED | OUTPATIENT
Start: 2022-04-27 | End: 2022-08-04 | Stop reason: ALTCHOICE

## 2022-04-28 ENCOUNTER — OFFICE VISIT (OUTPATIENT)
Dept: PAIN MEDICINE | Facility: MEDICAL CENTER | Age: 52
End: 2022-04-28
Payer: MEDICARE

## 2022-04-28 VITALS
HEIGHT: 62 IN | DIASTOLIC BLOOD PRESSURE: 68 MMHG | HEART RATE: 65 BPM | TEMPERATURE: 98.3 F | BODY MASS INDEX: 26.67 KG/M2 | OXYGEN SATURATION: 96 % | SYSTOLIC BLOOD PRESSURE: 126 MMHG

## 2022-04-28 DIAGNOSIS — M54.12 CERVICAL RADICULOPATHY: ICD-10-CM

## 2022-04-28 DIAGNOSIS — M46.1 SACROILIITIS (HCC): ICD-10-CM

## 2022-04-28 DIAGNOSIS — G62.9 NEUROPATHY: ICD-10-CM

## 2022-04-28 DIAGNOSIS — G89.4 CHRONIC PAIN SYNDROME: Primary | ICD-10-CM

## 2022-04-28 DIAGNOSIS — M54.2 NECK PAIN: ICD-10-CM

## 2022-04-28 PROCEDURE — 99214 OFFICE O/P EST MOD 30 MIN: CPT | Performed by: NURSE PRACTITIONER

## 2022-04-28 RX ORDER — DOCUSATE SODIUM 100 MG/1
CAPSULE, LIQUID FILLED ORAL
COMMUNITY
Start: 2022-03-23 | End: 2022-06-15

## 2022-04-28 RX ORDER — PREGABALIN 50 MG/1
100 CAPSULE ORAL 2 TIMES DAILY
Qty: 120 CAPSULE | Refills: 1 | Status: SHIPPED | OUTPATIENT
Start: 2022-04-28 | End: 2022-05-03 | Stop reason: DRUGHIGH

## 2022-04-28 NOTE — PROGRESS NOTES
Assessment  1  Chronic pain syndrome    2  Neck pain    3  Cervical radiculopathy    4  Neuropathy    5  Sacroiliitis ([address] )        Plan  The patient was seen in the office for re-evaluation of her chronic pain secondary to neck pain with cervical radiculopathy  The patient had a cervical epidural steroid injection on 01/13/2022 that initially relieved her pain symptoms by 90%  Her pain gradually started to return after about 6 weeks to 2 months but she was still obtaining about 50% relief from her pain symptoms until a few weeks ago  She has been managing her neck pain for many years and for started physical and occupational therapy back in 2013 and went for a significant period time and then stop  She continued to do home exercises at that time and returned to physical therapy in 2019  Again, she had home physical therapy and was put on a program for her to do once therapy stopped  She has continued to do her neck exercises daily and states that sometimes she feels relief from these exercises but lately they are not relieving her pain symptoms the way they used to  Patient also had bilateral sacroiliac joint injections on 03/31/2022 that she states helped to relieve her pain symptoms but it is hard for her to give a percentage as to how much it helped because she also is dealing with chronic pelvic pain  Patient states that this has been ongoing for quite some time and she has been seen in the ER for this issue  I did suggest that she should follow-up with gynecology and possibly urology and GI  Complete risks and benefits including bleeding, infection, tissue reaction, nerve injury and allergic reaction were discussed  The approach was demonstrated using models and literature was provided  Verbal and written consent was obtained  My impressions and treatment recommendations were discussed in detail with the patient who verbalized understanding and had no further questions    Discharge instructions were provided  I personally saw and examined the patient and I agree with the above discussed plan of care  No orders of the defined types were placed in this encounter  New Medications Ordered This Visit   Medications    docusate sodium (COLACE) 100 mg capsule    pregabalin (LYRICA) 50 mg capsule     Sig: Take 2 capsules (100 mg total) by mouth 2 (two) times a day     Dispense:  120 capsule     Refill:  1       History of Present Illness    Neeraj Pulido is a 46 y o  female who presents to the office with a pain score of 6/10 that is constant  She states that it can become as bad as a 10/10 in her neck  She states that it depends on the day and the activity level  She states that her pain started to return severely about a month ago  She describes the quality as burning, sharp, throbbing, pressure-like, shooting with numbness and pins and needles  She tells me that her pain is usually severe but it is not as bad at the moment because she took the oxycodone 10 mg before coming to the office  I have personally reviewed and/or updated the patient's past medical history, past surgical history, family history, social history, current medications, allergies, and vital signs today  Review of Systems   HENT: Positive for hearing loss  Respiratory: Negative for shortness of breath  Cardiovascular: Positive for chest pain  Gastrointestinal: Positive for constipation and nausea  Negative for diarrhea and vomiting  Musculoskeletal: Positive for gait problem, joint swelling and myalgias  Negative for arthralgias  Skin: Negative for rash  Neurological: Positive for dizziness and weakness  Negative for seizures  Psychiatric/Behavioral: Positive for decreased concentration  All other systems reviewed and are negative        Patient Active Problem List   Diagnosis    Dyslipidemia    Granulomatosis with polyangiitis ([address] )    MPA (microscopic polyangiitis) (HCC)    Asthma    Benign essential HTN    Chronic right shoulder pain    Noncompliance    Bipolar 1 disorder (HCC)    Epigastric pain    Pancreatitis    Ovarian cyst    Postherpetic neuralgia    Anemia of chronic disease    Arthralgia of multiple joints    Cataplexy    Weakness of both lower extremities    Diarrhea of presumed infectious origin    Chronic pelvic pain in female    Plantar wart, right foot    Seasonal allergic rhinitis due to pollen    Gastroesophageal reflux disease    Weakness of both upper extremities    Persistent proteinuria    Left leg pain    Controlled substance agreement signed    Chronic narcotic use    Small fiber neuropathy    IBS (irritable bowel syndrome)    Leukocytosis    Hyperosmia    Smell disturbance    Contusion of left hip    Trochanteric bursitis of left hip    Neuropathy    Attention deficit hyperactivity disorder (ADHD)    Elevated blood pressure reading    Costochondritis    Hearing difficulty of both ears    Vaginal atrophy    Alpha-hemolytic Streptococcus positive urine culture    Smoking    Schizo-affective schizophrenia ([facility] )    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[facility] )    Cervical radiculopathy    Right arm numbness    Fall at home, initial encounter    Sinus bradycardia    Marijuana use    Difficulty ventilating with mask    Low back pain radiating to left lower extremity    Lump of skin of back    Cervical disc disorder with radiculopathy of mid-cervical region    Neck pain    Cervical spinal stenosis    Depression    Continuous opioid dependence ([facility])    Chronic kidney disease, stage 4 (severe) ([facility])    Chronic pain    COVID    PTSD (post-traumatic stress disorder)    Pain of finger of right hand    Sacroiliitis ([facility])       Past Medical History:   Diagnosis Date    ADHD     Anemia of chronic disease     Anxiety     Arthritis ?     Asthma     Bipolar disorder ([facility] )     Borderline personality disorder ([facility] )     Cataplexy     Chronic abdominal pain     Chronic kidney disease ?  CKD (chronic kidney disease) stage 3, GFR 30-59 ml/min (HCC)     Cushing syndrome (HCC)     Depression ?  Diabetes mellitus ([facility]     DVT (deep venous thrombosis) (HCC)     GERD (gastroesophageal reflux disease)     Headache(784 0) 3 months    History of acute pancreatitis     felt secondary to Bactrim    History of transfusion     Hypertension     Liver disease     fatty liver    Microscopic polyangiitis (HCC)     Ovarian cyst     PTSD (post-traumatic stress disorder)     Self-inflicted injury     self inflicted skin wounds    [facility]        Past Surgical History:   Procedure Laterality Date    ESOPHAGOGASTRODUODENOSCOPY  2015    mild antral gastritis    GASTRIC STIMULATOR IMPLANT SURGERY  2020    FL COLONOSCOPY FLX DX W/COLLJ SPEC WHEN PFRMD N/A 2018    adenoma removed from the transverse, hyperplastic polyp removed from the left colon    FL ESOPHAGOGASTRODUODENOSCOPY TRANSORAL DIAGNOSTIC N/A 2018    gastritis and scant coffee-ground material   Biopsies negative for H  pylori    RELEASE SCAR CONTRACTURE / GRAFT REPAIRS OF HAND Bilateral     UPPER GASTROINTESTINAL ENDOSCOPY  2019    Dr Yo Person   Botox to the pylorus       Family History   Problem Relation Age of Onset    Arthritis Mother     Depression Mother     Diabetes Mother     Mental illness Mother    Julieann Frankel Migraines Mother     No Known Problems Father     Colon cancer Neg Hx     Drug abuse Neg Hx         mother father    Mental illness Neg Hx         disorder, mother father    Cancer Neg Hx     Breast cancer Neg Hx        Social History     Occupational History    Occupation: disability   Tobacco Use    Smoking status: Former Smoker     Packs/day: 1 00     Years: 10 00     Pack years: 10 00     Types: Cigarettes     Quit date: 2011     Years since quittin 3    Smokeless tobacco: Never Used    Tobacco comment: Stopped smoking 11 years ago   Vaping Use    Vaping Use: Never used   Substance and Sexual Activity    Alcohol use: Never    Drug use: Yes     Types: Marijuana     Comment: marijuana daily    Sexual activity: Not Currently     Partners: Male     Birth control/protection: None       Current Outpatient Medications on File Prior to Visit   Medication Sig    acetaminophen (TYLENOL) 500 mg tablet Take 2 tablets (1,000 mg total) by mouth every 8 (eight) hours as needed for mild pain    amLODIPine (NORVASC) 5 mg tablet TAKE ONE TABLET BY MOUTH ONCE DAILY    amphetamine-dextroamphetamine (ADDERALL XR) 20 MG 24 hr capsule Take 1 capsule (20 mg total) by mouth 2 (two) times a day Max Daily Amount: 40 mg    buPROPion (WELLBUTRIN XL) 300 mg 24 hr tablet Take 1 tablet (300 mg total) by mouth every morning    clobetasol (TEMOVATE) 0 05 % cream Apply topically 2 (two) times a day    Diclofenac Sodium (VOLTAREN) 1 % Apply 2 g topically 4 (four) times a day    docusate sodium (COLACE) 100 mg capsule     hydrOXYzine HCL (ATARAX) 25 mg tablet     insulin aspart (NovoLOG FlexPen) 100 UNIT/ML injection pen Inject 6 Units under the skin 3 (three) times a day with meals    insulin glargine (Toujeo Max SoloStar) 300 units/mL CONCENTRATED U-300 injection pen (2-unit dial) Inject 22 Units under the skin daily    lidocaine (XYLOCAINE) 5 % ointment APPLY TOPICALLY 2GM TWICE A DAY TO AFFECTED AREAS AS NEEDED FOR MILD PAIN    Linzess 72 MCG CAPS TAKE ONE CAPSULE BY MOUTH ONCE DAILY    ondansetron (ZOFRAN) 8 mg tablet Take 1 tablet (8 mg total) by mouth every 8 (eight) hours as needed for nausea or vomiting    oxyCODONE (ROXICODONE) 10 MG TABS Take 1 tablet (10 mg total) by mouth every 4 (four) hours as needed for moderate pain Max Daily Amount: 60 mg    pantoprazole (PROTONIX) 40 mg tablet Take 1 tablet (40 mg total) by mouth 2 (two) times a day before meals    Probiotic Product (PROBIOTIC-10 PO) Take 1 tablet by mouth daily    promethazine (PHENERGAN) 25 mg tablet Take 1 tablet (25 mg total) by mouth every 6 (six) hours as needed for nausea or vomiting    Restasis 0 05 % ophthalmic emulsion     scopolamine (TRANSDERM-SCOP) 1 5 mg/3 days TD 72 hr patch Place 1 patch on the skin every third day    sucralfate (CARAFATE) 1 g/10 mL suspension TAKE 10ML BY MOUTH THREE TIMES A DAY    [DISCONTINUED] pregabalin (LYRICA) 50 mg capsule Take 1 capsule (50 mg total) by mouth 2 (two) times a day    Alcohol Swabs (Alcohol Pads) 70 % PADS Use 4 (four) times a day    Blood Glucose Monitoring Suppl (FreeStyle Lite) DEIRDRE Use daily    cholecalciferol (VITAMIN D3) 1,000 units tablet Take 2 tablets (2,000 Units total) by mouth daily for 20 days    Easy Comfort Lancets MISC USE TO TEST THE BLOOD SUGAR THREE TIMES A DAY    glucose blood (FREESTYLE LITE) test strip Use as instructed    Insulin Pen Needle (Pen Needles) 31G X 8 MM MISC Inject under the skin 4 (four) times a day (with meals and at bedtime)    Lidocaine Viscous HCl (XYLOCAINE) 2 % mucosal solution Swish and spit 15 mL 4 (four) times a day as needed for mouth pain or discomfort     No current facility-administered medications on file prior to visit         Allergies   Allergen Reactions    Prozac [Fluoxetine Hcl]      SI    Bactrim [Sulfamethoxazole-Trimethoprim]      Pt "They think that is what cause the pancreatitis"     Flagyl [Metronidazole] Diarrhea and Abdominal Pain    Lamictal [Lamotrigine] GI Intolerance    Lithium Other (See Comments)    Haldol [Haloperidol] Other (See Comments)     "I don't like it"    Ibuprofen     Lexapro [Escitalopram Oxalate] Rash    Navane [Thiothixene]      SI    Other      "novaine?" antipsychotic       Physical Exam    /68   Pulse 65   Temp 98 3 °F (36 8 °C)   Ht 5' 2" (1 575 m) Comment: stated  LMP  (LMP Unknown)   SpO2 96%   BMI 26 67 kg/m²     Constitutional: overweight  Eyes: anicteric  HEENT: grossly intact  Neck: supple, symmetric, trachea midline and no masses   Pulmonary:even and unlabored  Cardiovascular:No edema or pitting edema present  Skin:Normal without rashes or lesions and well hydrated  Psychiatric:Mood and affect appropriate  Neurologic:Cranial Nerves II-XII grossly intact  Musculoskeletal:in wheelchair and Limited range of motion in neck due to pain and stiffness  Patient also continues to have tenderness over bilateral sacroiliac joints      Imaging

## 2022-04-29 ENCOUNTER — TELEPHONE (OUTPATIENT)
Dept: PAIN MEDICINE | Facility: MEDICAL CENTER | Age: 52
End: 2022-04-29

## 2022-04-29 NOTE — TELEPHONE ENCOUNTER
Sophia Escobar from [facility] called stating that the patient needs a  prior auth for [facility] SPECIALTY [facility] [place] lauren Rubio [phone]      [phone]

## 2022-05-01 ENCOUNTER — APPOINTMENT (EMERGENCY)
Dept: CT IMAGING | Facility: HOSPITAL | Age: 52
End: 2022-05-01
Payer: MEDICARE

## 2022-05-01 ENCOUNTER — HOSPITAL ENCOUNTER (EMERGENCY)
Facility: HOSPITAL | Age: 52
Discharge: HOME | End: 2022-05-01
Attending: EMERGENCY MEDICINE
Payer: MEDICARE

## 2022-05-01 VITALS
OXYGEN SATURATION: 97 % | DIASTOLIC BLOOD PRESSURE: 68 MMHG | TEMPERATURE: 99 F | SYSTOLIC BLOOD PRESSURE: 135 MMHG | RESPIRATION RATE: 16 BRPM | HEART RATE: 78 BPM

## 2022-05-01 DIAGNOSIS — R10.13 EPIGASTRIC PAIN: Primary | ICD-10-CM

## 2022-05-01 LAB
ALBUMIN SERPL BCP-MCNC: 3.4 G/DL (ref 3.5–5)
ALP SERPL-CCNC: 112 U/L (ref 46–116)
ALT SERPL W P-5'-P-CCNC: 26 U/L (ref 12–78)
ANION GAP SERPL CALCULATED.3IONS-SCNC: 6 MMOL/L (ref 4–13)
AST SERPL W P-5'-P-CCNC: 34 U/L (ref 5–45)
ATRIAL RATE: 61 BPM
BASE EX.OXY STD BLDV CALC-SCNC: 81.8 % (ref 60–80)
BASE EXCESS BLDV CALC-SCNC: -2.5 MMOL/L
BASOPHILS # BLD AUTO: 0.01 THOUSANDS/ΜL (ref 0–0.1)
BASOPHILS NFR BLD AUTO: 0 % (ref 0–1)
BILIRUB SERPL-MCNC: 0.33 MG/DL (ref 0.2–1)
BUN SERPL-MCNC: 35 MG/DL (ref 5–25)
CALCIUM ALBUM COR SERPL-MCNC: 9.2 MG/DL (ref 8.3–10.1)
CALCIUM SERPL-MCNC: 8.7 MG/DL (ref 8.3–10.1)
CARDIAC TROPONIN I PNL SERPL HS: 2 NG/L
CHLORIDE SERPL-SCNC: 108 MMOL/L (ref 100–108)
CO2 SERPL-SCNC: 24 MMOL/L (ref 21–32)
CREAT SERPL-MCNC: 2.11 MG/DL (ref 0.6–1.3)
EOSINOPHIL # BLD AUTO: 0.01 THOUSAND/ΜL (ref 0–0.61)
EOSINOPHIL NFR BLD AUTO: 0 % (ref 0–6)
ERYTHROCYTE [DISTWIDTH] IN BLOOD BY AUTOMATED COUNT: 13.1 % (ref 11.6–15.1)
GFR SERPL CREATININE-BSD FRML MDRD: 26 ML/MIN/1.73SQ M
GLUCOSE SERPL-MCNC: 86 MG/DL (ref 65–140)
HCO3 BLDV-SCNC: 21.8 MMOL/L (ref 24–30)
HCT VFR BLD AUTO: 41.5 % (ref 34.8–46.1)
HGB BLD-MCNC: 13.7 G/DL (ref 11.5–15.4)
IMM GRANULOCYTES # BLD AUTO: 0.02 THOUSAND/UL (ref 0–0.2)
IMM GRANULOCYTES NFR BLD AUTO: 1 % (ref 0–2)
LACTATE SERPL-SCNC: 0.6 MMOL/L (ref 0.5–2)
LIPASE SERPL-CCNC: 454 U/L (ref 73–393)
LYMPHOCYTES # BLD AUTO: 0.87 THOUSANDS/ΜL (ref 0.6–4.47)
LYMPHOCYTES NFR BLD AUTO: 20 % (ref 14–44)
MAGNESIUM SERPL-MCNC: 1.7 MG/DL (ref 1.6–2.6)
MCH RBC QN AUTO: 31.3 PG (ref 26.8–34.3)
MCHC RBC AUTO-ENTMCNC: 33 G/DL (ref 31.4–37.4)
MCV RBC AUTO: 95 FL (ref 82–98)
MONOCYTES # BLD AUTO: 0.55 THOUSAND/ΜL (ref 0.17–1.22)
MONOCYTES NFR BLD AUTO: 12 % (ref 4–12)
NEUTROPHILS # BLD AUTO: 2.96 THOUSANDS/ΜL (ref 1.85–7.62)
NEUTS SEG NFR BLD AUTO: 67 % (ref 43–75)
NRBC BLD AUTO-RTO: 0 /100 WBCS
O2 CT BLDV-SCNC: 16.1 ML/DL
P AXIS: 63 DEGREES
PCO2 BLDV: 36.1 MM HG (ref 42–50)
PH BLDV: 7.4 [PH] (ref 7.3–7.4)
PLATELET # BLD AUTO: 202 THOUSANDS/UL (ref 149–390)
PMV BLD AUTO: 10.6 FL (ref 8.9–12.7)
PO2 BLDV: 46.7 MM HG (ref 35–45)
POTASSIUM SERPL-SCNC: 5 MMOL/L (ref 3.5–5.3)
PR INTERVAL: 148 MS
PROT SERPL-MCNC: 7.7 G/DL (ref 6.4–8.2)
QRS AXIS: -18 DEGREES
QRSD INTERVAL: 64 MS
QT INTERVAL: 406 MS
QTC INTERVAL: 408 MS
RBC # BLD AUTO: 4.38 MILLION/UL (ref 3.81–5.12)
SODIUM SERPL-SCNC: 138 MMOL/L (ref 136–145)
T WAVE AXIS: -6 DEGREES
VENTRICULAR RATE: 61 BPM
WBC # BLD AUTO: 4.42 THOUSAND/UL (ref 4.31–10.16)

## 2022-05-01 PROCEDURE — 82805 BLOOD GASES W/O2 SATURATION: CPT

## 2022-05-01 PROCEDURE — 83605 ASSAY OF LACTIC ACID: CPT

## 2022-05-01 PROCEDURE — 36415 COLL VENOUS BLD VENIPUNCTURE: CPT

## 2022-05-01 PROCEDURE — 93005 ELECTROCARDIOGRAM TRACING: CPT

## 2022-05-01 PROCEDURE — 80053 COMPREHEN METABOLIC PANEL: CPT

## 2022-05-01 PROCEDURE — 83735 ASSAY OF MAGNESIUM: CPT

## 2022-05-01 PROCEDURE — G1004 CDSM NDSC: HCPCS

## 2022-05-01 PROCEDURE — 99285 EMERGENCY DEPT VISIT HI MDM: CPT

## 2022-05-01 PROCEDURE — 93010 ELECTROCARDIOGRAM REPORT: CPT

## 2022-05-01 PROCEDURE — 85025 COMPLETE CBC W/AUTO DIFF WBC: CPT

## 2022-05-01 PROCEDURE — 96365 THER/PROPH/DIAG IV INF INIT: CPT

## 2022-05-01 PROCEDURE — 96375 TX/PRO/DX INJ NEW DRUG ADDON: CPT

## 2022-05-01 PROCEDURE — 96361 HYDRATE IV INFUSION ADD-ON: CPT

## 2022-05-01 PROCEDURE — 74176 CT ABD & PELVIS W/O CONTRAST: CPT

## 2022-05-01 PROCEDURE — 84484 ASSAY OF TROPONIN QUANT: CPT

## 2022-05-01 PROCEDURE — 83690 ASSAY OF LIPASE: CPT

## 2022-05-01 RX ORDER — MORPHINE SULFATE 4 MG/ML
4 INJECTION, SOLUTION INTRAMUSCULAR; INTRAVENOUS ONCE
Status: COMPLETED | OUTPATIENT
Start: 2022-05-01 | End: 2022-05-01

## 2022-05-01 RX ADMIN — MORPHINE SULFATE 4 MG: 4 INJECTION INTRAVENOUS at 14:37

## 2022-05-01 RX ADMIN — ONDANSETRON 8 MG: 2 INJECTION INTRAMUSCULAR; INTRAVENOUS at 14:39

## 2022-05-01 RX ADMIN — SODIUM CHLORIDE 1000 ML: 0.9 INJECTION, SOLUTION INTRAVENOUS at 14:37

## 2022-05-01 NOTE — ED PROVIDER NOTES
History  Chief Complaint   Patient presents with    Abdominal Pain     patient c/o LUQ abdominal pain going on for 2 days  hx of pancreatitis  states multiple vomiting episodes  Patient is a 54-year-old female coming for 2 days of abdominal pain, and vomiting who states that she has a past history of pancreatitis, surgeries, and diabetes  Patient states she feels that she is dehydrated, and is concerned this is pancreatitis again  Patient is in no acute distress at presentation  History provided by:  Patient   used: No    Abdominal Pain  Pain location:  Epigastric  Pain severity:  Mild  Associated symptoms: nausea and vomiting    Associated symptoms: no chest pain, no chills, no constipation, no diarrhea, no dysuria, no fatigue, no fever, no hematuria and no shortness of breath        Prior to Admission Medications   Prescriptions Last Dose Informant Patient Reported? Taking?    Alcohol Swabs (Alcohol Pads) 70 % PADS   No No   Sig: Use 4 (four) times a day   Blood Glucose Monitoring Suppl (FreeStyle Lite) DEIRDRE   No No   Sig: Use daily   Diclofenac Sodium (VOLTAREN) 1 %   No No   Sig: Apply 2 g topically 4 (four) times a day   Easy Comfort Lancets MISC   No No   Sig: USE TO TEST THE BLOOD SUGAR THREE TIMES A DAY   Insulin Pen Needle (Pen Needles) 31G X 8 MM MISC   No No   Sig: Inject under the skin 4 (four) times a day (with meals and at bedtime)   Lidocaine Viscous HCl (XYLOCAINE) 2 % mucosal solution   No No   Sig: Swish and spit 15 mL 4 (four) times a day as needed for mouth pain or discomfort   Linzess 72 MCG CAPS   No No   Sig: TAKE ONE CAPSULE BY MOUTH ONCE DAILY   Probiotic Product (PROBIOTIC-10 PO)  Self Yes No   Sig: Take 1 tablet by mouth daily   Restasis 0 05 % ophthalmic emulsion   Yes No   acetaminophen (TYLENOL) 500 mg tablet   No No   Sig: Take 2 tablets (1,000 mg total) by mouth every 8 (eight) hours as needed for mild pain   amLODIPine (NORVASC) 5 mg tablet   No No Sig: TAKE ONE TABLET BY MOUTH ONCE DAILY   amphetamine-dextroamphetamine (ADDERALL XR) 20 MG 24 hr capsule   No No   Sig: Take 1 capsule (20 mg total) by mouth 2 (two) times a day Max Daily Amount: 40 mg   buPROPion (WELLBUTRIN XL) 300 mg 24 hr tablet   No No   Sig: Take 1 tablet (300 mg total) by mouth every morning   cholecalciferol (VITAMIN D3) 1,000 units tablet   No No   Sig: Take 2 tablets (2,000 Units total) by mouth daily for 20 days   clobetasol (TEMOVATE) 0 05 % cream   No No   Sig: Apply topically 2 (two) times a day   docusate sodium (COLACE) 100 mg capsule   Yes No   glucose blood (FREESTYLE LITE) test strip   No No   Sig: Use as instructed   hydrOXYzine HCL (ATARAX) 25 mg tablet   Yes No   insulin aspart (NovoLOG FlexPen) 100 UNIT/ML injection pen   No No   Sig: Inject 6 Units under the skin 3 (three) times a day with meals   insulin glargine (Toujeo Max SoloStar) 300 units/mL CONCENTRATED U-300 injection pen (2-unit dial)   No No   Sig: Inject 22 Units under the skin daily   lidocaine (XYLOCAINE) 5 % ointment   No No   Sig: APPLY TOPICALLY 2GM TWICE A DAY TO AFFECTED AREAS AS NEEDED FOR MILD PAIN   ondansetron (ZOFRAN) 8 mg tablet   No No   Sig: Take 1 tablet (8 mg total) by mouth every 8 (eight) hours as needed for nausea or vomiting   oxyCODONE (ROXICODONE) 10 MG TABS   No No   Sig: Take 1 tablet (10 mg total) by mouth every 4 (four) hours as needed for moderate pain Max Daily Amount: 60 mg   pantoprazole (PROTONIX) 40 mg tablet   No No   Sig: Take 1 tablet (40 mg total) by mouth 2 (two) times a day before meals   pregabalin (LYRICA) 50 mg capsule   No No   Sig: Take 2 capsules (100 mg total) by mouth 2 (two) times a day   promethazine (PHENERGAN) 25 mg tablet   No No   Sig: Take 1 tablet (25 mg total) by mouth every 6 (six) hours as needed for nausea or vomiting   scopolamine (TRANSDERM-SCOP) 1 5 mg/3 days TD 72 hr patch   No No   Sig: Place 1 patch on the skin every third day   sucralfate (CARAFATE) 1 g/10 mL suspension   No No   Sig: TAKE 10ML BY MOUTH THREE TIMES A DAY      Facility-Administered Medications: None       Past Medical History:   Diagnosis Date    ADHD     Anemia of chronic disease     Anxiety     Arthritis ?  Asthma     Bipolar disorder ([place] [address] )     Borderline personality disorder ([place] [address] )     Cataplexy     Chronic abdominal pain     Chronic kidney disease ?  CKD (chronic kidney disease) stage 3, GFR 30-59 ml/min (HCC)     Cushing syndrome (HCC)     Depression ?  Diabetes mellitus ([place] [address] )     DVT (deep venous thrombosis) (HCC)     GERD (gastroesophageal reflux disease)     Headache(784 0) 3 months    History of acute pancreatitis     felt secondary to Bactrim    History of transfusion     Hypertension     Liver disease     fatty liver    Microscopic polyangiitis (HCC)     Ovarian cyst     PTSD (post-traumatic stress disorder)     Self-inflicted injury     self inflicted skin wounds    Wegener's granulomatosis with renal involvement ([place] [address] ) 2015       Past Surgical History:   Procedure Laterality Date    ESOPHAGOGASTRODUODENOSCOPY  09/11/2015    mild antral gastritis    GASTRIC STIMULATOR IMPLANT SURGERY  06/25/2020    KS COLONOSCOPY FLX DX W/COLLJ SPEC WHEN PFRMD N/A 12/14/2018    adenoma removed from the transverse, hyperplastic polyp removed from the left colon    [place] ESOPHAGOGASTRODUODENOSCOPY TRANSORAL DIAGNOSTIC N/A 12/14/2018    gastritis and scant coffee-ground material   Biopsies negative for H  pylori    RELEASE SCAR CONTRACTURE / GRAFT REPAIRS OF HAND Bilateral     UPPER GASTROINTESTINAL ENDOSCOPY  12/26/2019    Dr Shaniqua Wallace   Botox to the pylorus       Family History   Problem Relation Age of Onset    Arthritis Mother     Depression Mother     Diabetes Mother     Mental illness Mother    Ethelyn [place] Migraines Mother     No Known Problems Father     Colon cancer Neg Hx     Drug abuse Neg Hx         mother father    Mental illness Neg Hx         disorder, mother father    Cancer Neg Hx     Breast cancer Neg Hx      I have reviewed and agree with the history as documented  E-Cigarette/Vaping    E-Cigarette Use Never User      E-Cigarette/Vaping Substances    Nicotine No     THC No     CBD No     Flavoring No     Other No     Unknown No      Social History     Tobacco Use    Smoking status: Former Smoker     Packs/day: 1 00     Years: 10 00     Pack years: 10 00     Types: Cigarettes     Quit date: 2011     Years since quittin 3    Smokeless tobacco: Never Used    Tobacco comment: Stopped smoking 11 years ago   Vaping Use    Vaping Use: Never used   Substance Use Topics    Alcohol use: Never    Drug use: Yes     Types: Marijuana     Comment: marijuana daily       Review of Systems   Constitutional: Negative  Negative for chills, fatigue and fever  HENT: Negative  Eyes: Negative  Respiratory: Negative  Negative for chest tightness and shortness of breath  Cardiovascular: Negative  Negative for chest pain and palpitations  Gastrointestinal: Positive for abdominal pain, nausea and vomiting  Negative for constipation and diarrhea  Genitourinary: Negative  Negative for dysuria and hematuria  Musculoskeletal: Negative  Skin: Negative  Neurological: Negative  Psychiatric/Behavioral: Negative  Physical Exam  Physical Exam  Vitals reviewed  Constitutional:       Appearance: She is well-developed and normal weight  HENT:      Head: Normocephalic and atraumatic  Right Ear: External ear normal       Left Ear: External ear normal       Nose: Nose normal    Eyes:      Conjunctiva/sclera: Conjunctivae normal    Cardiovascular:      Rate and Rhythm: Normal rate  Pulmonary:      Effort: Pulmonary effort is normal    Abdominal:      Palpations: Abdomen is soft  Tenderness: There is abdominal tenderness in the epigastric area and left upper quadrant  There is no guarding   Negative signs include Garner's sign and McBurney's sign  Musculoskeletal:         General: Normal range of motion  Cervical back: Normal range of motion  Skin:     General: Skin is warm and dry  Neurological:      Mental Status: She is alert  Vital Signs  ED Triage Vitals   Temperature Pulse Respirations Blood Pressure SpO2   05/01/22 1404 05/01/22 1404 05/01/22 1404 05/01/22 1404 05/01/22 1404   99 °F (37 2 °C) 65 18 141/89 95 %      Temp Source Heart Rate Source Patient Position - Orthostatic VS BP Location FiO2 (%)   05/01/22 1404 05/01/22 1507 05/01/22 1404 05/01/22 1404 --   Oral Monitor Lying Right arm       Pain Score       05/01/22 1437       10 - Worst Possible Pain           Vitals:    05/01/22 1404 05/01/22 1507 05/01/22 1611   BP: 141/89 115/55 135/68   Pulse: 65 61 78   Patient Position - Orthostatic VS: Lying Lying Sitting         Visual Acuity      ED Medications  Medications   ondansetron (ZOFRAN) 8 mg in sodium chloride 0 9 % 50 mL IVPB (0 mg Intravenous Stopped 5/1/22 1507)   sodium chloride 0 9 % bolus 1,000 mL (0 mL Intravenous Stopped 5/1/22 1611)   morphine (PF) 4 mg/mL injection 4 mg (4 mg Intravenous Given 5/1/22 1437)       Diagnostic Studies  Results Reviewed     Procedure Component Value Units Date/Time    Lactic acid, plasma [[id number]]  (Normal) Collected: 05/01/22 1425    Lab Status: Final result Specimen: Blood from Arm, Right Updated: 05/01/22 1501     LACTIC ACID 0 6 mmol/L     Narrative:      Result may be elevated if tourniquet was used during collection      Comprehensive metabolic panel [[id number]]  (Abnormal) Collected: 05/01/22 1420    Lab Status: Final result Specimen: Blood from Arm, Right Updated: 05/01/22 1458     Sodium 138 mmol/L      Potassium 5 0 mmol/L      Chloride 108 mmol/L      CO2 24 mmol/L      ANION GAP 6 mmol/L      BUN 35 mg/dL      Creatinine 2 11 mg/dL      Glucose 86 mg/dL      Calcium 8 7 mg/dL      Corrected Calcium 9 2 mg/dL      AST 34 U/L      ALT 26 U/L      Alkaline Phosphatase 112 U/L      Total Protein 7 7 g/dL      Albumin 3 4 g/dL      Total Bilirubin 0 33 mg/dL      eGFR 26 ml/min/1 73sq m     Narrative:      [place] guidelines for Chronic Kidney Disease (CKD):     Stage 1 with normal or high GFR (GFR > 90 mL/min/1 73 square meters)    Stage 2 Mild CKD (GFR = 60-89 mL/min/1 73 square meters)    Stage 3A Moderate CKD (GFR = 45-59 mL/min/1 73 square meters)    Stage 3B Moderate CKD (GFR = 30-44 mL/min/1 73 square meters)    Stage 4 Severe CKD (GFR = 15-29 mL/min/1 73 square meters)    Stage 5 End Stage CKD (GFR <15 mL/min/1 73 square meters)  Note: GFR calculation is accurate only with a steady state creatinine    Lipase [[id number]]  (Abnormal) Collected: 05/01/22 1420    Lab Status: Final result Specimen: Blood from Arm, Right Updated: 05/01/22 1458     Lipase 454 u/L     Magnesium [[id number]]  (Normal) Collected: 05/01/22 1420    Lab Status: Final result Specimen: Blood from Arm, Right Updated: 05/01/22 1458     Magnesium 1 7 mg/dL     HS Troponin 0hr (reflex protocol) [[id number]]  (Normal) Collected: 05/01/22 1420    Lab Status: Final result Specimen: Blood from Arm, Right Updated: 05/01/22 1451     hs TnI 0hr 2 ng/L     Blood gas, venous [[id number]]  (Abnormal) Collected: 05/01/22 1425    Lab Status: Final result Specimen: Blood from Arm, Right Updated: 05/01/22 1430     pH, Kirt 7 398     pCO2, Kirt 36 1 mm Hg      pO2, Kirt 46 7 mm Hg      HCO3, Kirt 21 8 mmol/L      Base Excess, Kirt -2 5 mmol/L      O2 Content, Kirt 16 1 ml/dL      O2 HGB, VENOUS 81 8 %     CBC and differential [[id number]] Collected: 05/01/22 1420    Lab Status: Final result Specimen: Blood from Arm, Right Updated: 05/01/22 1424     WBC 4 42 Thousand/uL      RBC 4 38 Million/uL      Hemoglobin 13 7 g/dL      Hematocrit 41 5 %      MCV 95 fL      MCH 31 3 pg      MCHC 33 0 g/dL      RDW 13 1 %      MPV 10 6 fL      Platelets 339 Thousands/uL      nRBC 0 /100 WBCs Neutrophils Relative 67 %      Immat GRANS % 1 %      Lymphocytes Relative 20 %      Monocytes Relative 12 %      Eosinophils Relative 0 %      Basophils Relative 0 %      Neutrophils Absolute 2 96 Thousands/µL      Immature Grans Absolute 0 02 Thousand/uL      Lymphocytes Absolute 0 87 Thousands/µL      Monocytes Absolute 0 55 Thousand/µL      Eosinophils Absolute 0 01 Thousand/µL      Basophils Absolute 0 01 Thousands/µL                  CT abdomen pelvis wo contrast   Final Result by Neeta Loernz MD (05/01 1538)      1  No evidence of bowel obstruction or CT evidence of acute pancreatitis   2  Colonic diverticulosis without evidence of diverticulitis   3  Overall, no acute inflammatory changes in the abdomen or pelvis            Workstation performed: [id number]                    Procedures  Procedures         ED Course  ED Course as of 05/01/22 1735   Sun May 01, 2022   1453 hs TnI 0hr: 2   1501 Lipase(!): 454   1501 Creatinine(!): 2 11  Elevated from 1 7 2 months ago   1502 Lipase(!): 454  Seems to be baseline   1502 LACTIC ACID: 0 6   1545 CT abdomen pelvis wo contrast  1  No evidence of bowel obstruction or CT evidence of acute pancreatitis  2  Colonic diverticulosis without evidence of diverticulitis  3  Overall, no acute inflammatory changes in the abdomen or pelvis   1553 Patient states that she feels much better at this time                               SBIRT 22yo+      Most Recent Value   SBIRT (24 yo +)    In order to provide better care to our patients, we are screening all of our patients for alcohol and drug use  Would it be okay to ask you these screening questions? No Filed at: 05/01/2022 1433                    MDM  Number of Diagnoses or Management Options  Epigastric pain: new and does not require workup  Diagnosis management comments: Patient presented with abdominal pain the last 2 days  Patient received Zofran, and some morphine here emergency department    Patient felt much better after her bag of fluid and was discharged home in no acute distress  Lab work showed no sign of acute pancreatitis  CT shows no abnormalities that explain pain    Counseling: I had a detailed discussion with the patient and/or guardian regarding: the historical points, exam findings, and any diagnostic results supporting the discharge diagnosis, lab results, radiology results, discharge instructions reviewed with patient and/or family/caregiver and understanding was verbalized  Instructions given to return to the emergency department if symptoms worsen or persist, or if there are any questions or concerns that arise at home       All labs reviewed and utilized in the medical decision making process     All radiology studies independently viewed by me and interpreted by the radiologist     Portions of the record may have been created with voice recognition software   Occasional wrong word or "sound a like" substitutions may have occurred due to the inherent limitations of voice recognition software   Read the chart carefully and recognize, using context, where substitutions have occurred  Amount and/or Complexity of Data Reviewed  Clinical lab tests: ordered and reviewed  Tests in the radiology section of CPT®: ordered and reviewed    Risk of Complications, Morbidity, and/or Mortality  Presenting problems: minimal  Diagnostic procedures: minimal  Management options: minimal    Patient Progress  Patient progress: stable      Disposition  Final diagnoses:   Epigastric pain     Time reflects when diagnosis was documented in both MDM as applicable and the Disposition within this note     Time User Action Codes Description Comment    5/1/2022  3:54 PM Issa Ely Add [R10 13] Epigastric pain       ED Disposition     ED Disposition Condition Date/Time Comment    Discharge Stable Sun May 1, 2022  3:54 PM José Antonio Lara discharge to home/self care              Follow-up Information     Follow up With Specialties Details Why Contact Info Additional Information    [facility]   [address]  [address] [facility]  [address]  [facility] [address]        [address] [facility] Emergency Department Emergency Medicine  As needed, If symptoms worsen [place] [id number] [address] [facility] Emergency Department, [address] [place], [place], [address]          Discharge Medication List as of 5/1/2022  3:54 PM      CONTINUE these medications which have NOT CHANGED    Details   acetaminophen (TYLENOL) 500 mg tablet Take 2 tablets (1,000 mg total) by mouth every 8 (eight) hours as needed for mild pain, Starting Mon 2/28/2022, Normal      Alcohol Swabs (Alcohol Pads) 70 % PADS Use 4 (four) times a day, Starting Wed 12/1/2021, Normal      amLODIPine (NORVASC) 5 mg tablet TAKE ONE TABLET BY MOUTH ONCE DAILY, Normal      amphetamine-dextroamphetamine (ADDERALL XR) 20 MG 24 hr capsule Take 1 capsule (20 mg total) by mouth 2 (two) times a day Max Daily Amount: 40 mg, Starting Tue 4/19/2022, Normal      Blood Glucose Monitoring Suppl (FreeStyle Lite) DEIRDRE Use daily, Starting Fri 7/23/2021, Normal      buPROPion (WELLBUTRIN XL) 300 mg 24 hr tablet Take 1 tablet (300 mg total) by mouth every morning, Starting Tue 1/18/2022, Normal      cholecalciferol (VITAMIN D3) 1,000 units tablet Take 2 tablets (2,000 Units total) by mouth daily for 20 days, Starting Wed 2/16/2022, Until Mon 4/18/2022, Normal      clobetasol (TEMOVATE) 0 05 % cream Apply topically 2 (two) times a day, Starting Fri 3/11/2022, Normal      Diclofenac Sodium (VOLTAREN) 1 % Apply 2 g topically 4 (four) times a day, Starting Wed 4/6/2022, Normal      docusate sodium (COLACE) 100 mg capsule Starting Wed 3/23/2022, Historical Med      Easy Comfort Lancets MISC USE TO TEST THE BLOOD SUGAR THREE TIMES A DAY, Normal      glucose blood (FREESTYLE LITE) test strip Use as instructed, Normal      hydrOXYzine HCL (ATARAX) 25 mg tablet Starting Fri 1/14/2022, Historical Med      insulin aspart (NovoLOG FlexPen) 100 UNIT/ML injection pen Inject 6 Units under the skin 3 (three) times a day with meals, Starting Mon 3/7/2022, Normal      insulin glargine (Toujeo Max SoloStar) 300 units/mL CONCENTRATED U-300 injection pen (2-unit dial) Inject 22 Units under the skin daily, Starting Mon 3/7/2022, Normal      Insulin Pen Needle (Pen Needles) 31G X 8 MM MISC Inject under the skin 4 (four) times a day (with meals and at bedtime), Starting Tue 3/1/2022, Normal      lidocaine (XYLOCAINE) 5 % ointment APPLY TOPICALLY 2GM TWICE A DAY TO AFFECTED AREAS AS NEEDED FOR MILD PAIN, Normal      Lidocaine Viscous HCl (XYLOCAINE) 2 % mucosal solution Swish and spit 15 mL 4 (four) times a day as needed for mouth pain or discomfort, Starting Fri 7/16/2021, Normal      Linzess 72 MCG CAPS TAKE ONE CAPSULE BY MOUTH ONCE DAILY, Normal      ondansetron (ZOFRAN) 8 mg tablet Take 1 tablet (8 mg total) by mouth every 8 (eight) hours as needed for nausea or vomiting, Starting Tue 3/29/2022, Normal      oxyCODONE (ROXICODONE) 10 MG TABS Take 1 tablet (10 mg total) by mouth every 4 (four) hours as needed for moderate pain Max Daily Amount: 60 mg, Starting Tue 4/19/2022, Normal      pantoprazole (PROTONIX) 40 mg tablet Take 1 tablet (40 mg total) by mouth 2 (two) times a day before meals, Starting Tue 1/18/2022, Normal      pregabalin (LYRICA) 50 mg capsule Take 2 capsules (100 mg total) by mouth 2 (two) times a day, Starting Thu 4/28/2022, Normal      Probiotic Product (PROBIOTIC-10 PO) Take 1 tablet by mouth daily, Historical Med      promethazine (PHENERGAN) 25 mg tablet Take 1 tablet (25 mg total) by mouth every 6 (six) hours as needed for nausea or vomiting, Starting Mon 5/10/2021, Normal      Restasis 0 05 % ophthalmic emulsion Starting Wed 12/29/2021, Historical Med      scopolamine (TRANSDERM-SCOP) 1 5 mg/3 days TD 72 hr patch Place 1 patch on the skin every third day, Starting Tue 4/27/2021, Normal      sucralfate (CARAFATE) 1 g/10 mL suspension TAKE 10ML BY MOUTH THREE TIMES A DAY, Normal             No discharge procedures on file      PDMP Review       Value Time User    PDMP Reviewed  Yes 4/19/2022  3:13 PM Juan Francisco Martinez PA-C          ED Provider  Electronically Signed by           Dorina Easley PA-C  05/01/22 6470

## 2022-05-01 NOTE — Clinical Note
Michelle Kevin was seen and treated in our emergency department on 5/1/2022  No restrictions            Diagnosis:     Ciara    She may return on this date: If you have any questions or concerns, please don't hesitate to call        Sky Barajas PA-C    ______________________________           _______________          _______________  Hospital Representative                              Date                                Time

## 2022-05-02 NOTE — TELEPHONE ENCOUNTER
Prior Maddi Das was denied   Pregabalin is formulary but has a quantity limit  3 capsules are available without authorization

## 2022-05-03 ENCOUNTER — TELEPHONE (OUTPATIENT)
Dept: OBGYN CLINIC | Facility: HOSPITAL | Age: 52
End: 2022-05-03

## 2022-05-03 DIAGNOSIS — R21 RASH: ICD-10-CM

## 2022-05-03 DIAGNOSIS — G89.4 CHRONIC PAIN SYNDROME: Primary | ICD-10-CM

## 2022-05-03 RX ORDER — CLOBETASOL PROPIONATE 0.5 MG/G
CREAM TOPICAL
Qty: 60 G | Refills: 0 | Status: SHIPPED | OUTPATIENT
Start: 2022-05-03 | End: 2022-06-02

## 2022-05-03 RX ORDER — PREGABALIN 100 MG/1
100 CAPSULE ORAL 3 TIMES DAILY
Qty: 90 CAPSULE | Refills: 1 | Status: SHIPPED | OUTPATIENT
Start: 2022-05-03 | End: 2022-07-21 | Stop reason: SDUPTHER

## 2022-05-03 NOTE — TELEPHONE ENCOUNTER
I change the prescription to pregabalin 100 mg 3 times a day  Hopefully this will be covered by her insurance

## 2022-05-03 NOTE — TELEPHONE ENCOUNTER
Patient seen MARIANELA Wade in the office on 4/6 relating her right middle finger  The patient is calling in stating that the right ring finger (that one area) is completely numb and she is not having any feeling to the area and it has not gotten any better  She is no longer getting the sharp pain it is just completely numb, she is asking as to what further she should do relating this  Please advise          Call back# [phone]

## 2022-05-05 ENCOUNTER — HOSPITAL ENCOUNTER (OUTPATIENT)
Dept: RADIOLOGY | Facility: MEDICAL CENTER | Age: 52
Discharge: HOME | End: 2022-05-05
Attending: PHYSICAL MEDICINE & REHABILITATION | Admitting: PHYSICAL MEDICINE & REHABILITATION
Payer: MEDICARE

## 2022-05-05 VITALS
SYSTOLIC BLOOD PRESSURE: 97 MMHG | TEMPERATURE: 97.7 F | DIASTOLIC BLOOD PRESSURE: 68 MMHG | HEART RATE: 56 BPM | RESPIRATION RATE: 20 BRPM | OXYGEN SATURATION: 99 %

## 2022-05-05 DIAGNOSIS — M50.120 CERVICAL DISC DISORDER WITH RADICULOPATHY OF MID-CERVICAL REGION: ICD-10-CM

## 2022-05-05 PROCEDURE — 62321 NJX INTERLAMINAR CRV/THRC: CPT | Performed by: PHYSICAL MEDICINE & REHABILITATION

## 2022-05-05 RX ORDER — METHYLPREDNISOLONE ACETATE 80 MG/ML
80 INJECTION, SUSPENSION INTRA-ARTICULAR; INTRALESIONAL; INTRAMUSCULAR; PARENTERAL; SOFT TISSUE ONCE
Status: COMPLETED | OUTPATIENT
Start: 2022-05-05 | End: 2022-05-05

## 2022-05-05 RX ADMIN — IOHEXOL 1 ML: 300 INJECTION, SOLUTION INTRAVENOUS at 10:18

## 2022-05-05 RX ADMIN — METHYLPREDNISOLONE ACETATE 80 MG: 80 INJECTION, SUSPENSION INTRA-ARTICULAR; INTRALESIONAL; INTRAMUSCULAR; PARENTERAL; SOFT TISSUE at 10:19

## 2022-05-05 NOTE — DISCHARGE INSTRUCTIONS
Epidural Steroid Injection   WHAT YOU NEED TO KNOW:   An epidural steroid injection (DUC) is a procedure to inject steroid medicine into the epidural space  The epidural space is between your spinal cord and vertebrae  Steroids reduce inflammation and fluid buildup in your spine that may be causing pain  You may be given pain medicine along with the steroids  ACTIVITY  · Do not drive or operate machinery today  · No strenuous activity today - bending, lifting, etc   · You may resume normal activites starting tomorrow - start slowly and as tolerated  · You may shower today, but no tub baths or hot tubs  · You may have numbness for several hours from the local anesthetic  Please use caution and common sense, especially with weight-bearing activities  CARE OF THE INJECTION SITE  · If you have soreness or pain, apply ice to the area today (20 minutes on/20 minutes off)  · Starting tomorrow, you may use warm, moist heat or ice if needed  · You may have an increase or change in your discomfort for 36-48 hours after your treatment  · Apply ice and continue with any pain medication you have been prescribed  · Notify the [facility] if you have any of the following: redness, drainage, swelling, headache, stiff neck or fever above 100°F     SPECIAL INSTRUCTIONS  · Our office will contact you in approximately 7 days for a progress report  MEDICATIONS  · Continue to take all routine medications  · Our office may have instructed you to hold some medications  As no general anesthesia was used in today's procedure, you should not experience any side effects related to anesthesia  If you have a problem specifically related to your procedure, please call our office at [phone]  Problems not related to your procedure should be directed to your primary care physician

## 2022-05-05 NOTE — H&P
History of Present Illness:  The patient is a 46 y o  female who presents with complaints of neck pain    Patient Active Problem List   Diagnosis    Dyslipidemia    Granulomatosis with polyangiitis ([facility] [address] [facility]    MPA (microscopic polyangiitis) ([facility])    Asthma    Benign essential HTN    Chronic right shoulder pain    Noncompliance    Bipolar 1 disorder ([facility])    Epigastric pain    Pancreatitis    Ovarian cyst    Postherpetic neuralgia    Anemia of chronic disease    Arthralgia of multiple joints    Cataplexy    Weakness of both lower extremities    Diarrhea of presumed infectious origin    Chronic pelvic pain in female    Plantar wart, right foot    Seasonal allergic rhinitis due to pollen    Gastroesophageal reflux disease    Weakness of both upper extremities    Persistent proteinuria    Left leg pain    Controlled substance agreement signed    Chronic narcotic use    Small fiber neuropathy    IBS (irritable bowel syndrome)    Leukocytosis    Hyperosmia    Smell disturbance    Contusion of left hip    Trochanteric bursitis of left hip    Neuropathy    Attention deficit hyperactivity disorder (ADHD)    Elevated blood pressure reading    Costochondritis    Hearing difficulty of both ears    Vaginal atrophy    Alpha-hemolytic Streptococcus positive urine culture    Smoking    Schizo-affective schizophrenia ([facility] [address] [facility]    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[facility] [address] [facility]    Cervical radiculopathy    Right arm numbness    Fall at home, initial encounter    Sinus bradycardia    Marijuana use    Difficulty ventilating with mask    Low back pain radiating to left lower extremity    Lump of skin of back    Cervical disc disorder with radiculopathy of mid-cervical region    Neck pain    Cervical spinal stenosis    Depression    Continuous opioid dependence ([facility])    Chronic kidney disease, stage 4 (severe) ([facility])    Chronic pain    COVID    PTSD (post-traumatic stress disorder)    Pain of finger of right hand    Sacroiliitis [facility])       Past Medical History:   Diagnosis Date    ADHD     Anemia of chronic disease     Anxiety     Arthritis ?  Asthma     [facility]     Borderline personality disorder ([facility] )     Cataplexy     Chronic abdominal pain     Chronic kidney disease ?  CKD (chronic kidney disease) stage 3, GFR 30-59 ml/min (HCC)     Cushing syndrome (HCC)     Depression ?  Diabetes mellitus ([facility] )     DVT (deep venous thrombosis) (HCC)     GERD (gastroesophageal reflux disease)     Headache(784 0) 3 months    History of acute pancreatitis     felt secondary to Bactrim    History of transfusion     Hypertension     Liver disease     fatty liver    Microscopic polyangiitis (HCC)     Ovarian cyst     PTSD (post-traumatic stress disorder)     Self-inflicted injury     self inflicted skin wounds    Wegener's granulomatosis with renal involvement ([place] [id number] ) 2015       Past Surgical History:   Procedure Laterality Date    ESOPHAGOGASTRODUODENOSCOPY  09/11/2015    mild antral gastritis    GASTRIC STIMULATOR IMPLANT SURGERY  06/25/2020    WI COLONOSCOPY FLX DX W/COLLJ SPEC WHEN PFRMD N/A 12/14/2018    adenoma removed from the transverse, hyperplastic polyp removed from the left colon    WI ESOPHAGOGASTRODUODENOSCOPY TRANSORAL DIAGNOSTIC N/A 12/14/2018    gastritis and scant coffee-ground material   Biopsies negative for H  pylori    RELEASE SCAR CONTRACTURE / GRAFT REPAIRS OF HAND Bilateral     UPPER GASTROINTESTINAL ENDOSCOPY  12/26/2019    Dr Charlotte Feliciano   Botox to the pylorus         Current Outpatient Medications:     acetaminophen (TYLENOL) 500 mg tablet, Take 2 tablets (1,000 mg total) by mouth every 8 (eight) hours as needed for mild pain, Disp: 60 tablet, Rfl: 1    Alcohol Swabs (Alcohol Pads) 70 % PADS, Use 4 (four) times a day, Disp: 400 each, Rfl: 2    amLODIPine (NORVASC) 5 mg tablet, TAKE ONE TABLET BY MOUTH ONCE DAILY, Disp: 90 tablet, Rfl: 1   amphetamine-dextroamphetamine (ADDERALL XR) 20 MG 24 hr capsule, Take 1 capsule (20 mg total) by mouth 2 (two) times a day Max Daily Amount: 40 mg, Disp: 60 capsule, Rfl: 0    Blood Glucose Monitoring Suppl (FreeStyle Lite) DEIRDRE, Use daily, Disp: 1 each, Rfl: 0    buPROPion (WELLBUTRIN XL) 300 mg 24 hr tablet, Take 1 tablet (300 mg total) by mouth every morning, Disp: 30 tablet, Rfl: 2    cholecalciferol (VITAMIN D3) 1,000 units tablet, Take 2 tablets (2,000 Units total) by mouth daily for 20 days, Disp: 40 tablet, Rfl: 0    clobetasol (TEMOVATE) 0 05 % cream, APPLY TOPICALLY TO AFFECTED AREA TWICE A DAY, Disp: 60 g, Rfl: 0    Diclofenac Sodium (VOLTAREN) 1 %, Apply 2 g topically 4 (four) times a day, Disp: 100 g, Rfl: 0    docusate sodium (COLACE) 100 mg capsule, , Disp: , Rfl:     Easy Comfort Lancets MISC, USE TO TEST THE BLOOD SUGAR THREE TIMES A DAY, Disp: 300 each, Rfl: 10    glucose blood (FREESTYLE LITE) test strip, Use as instructed, Disp: 100 strip, Rfl: 0    hydrOXYzine HCL (ATARAX) 25 mg tablet, , Disp: , Rfl:     insulin aspart (NovoLOG FlexPen) 100 UNIT/ML injection pen, Inject 6 Units under the skin 3 (three) times a day with meals, Disp: 15 mL, Rfl: 0    insulin glargine (Toujeo Max SoloStar) 300 units/mL CONCENTRATED U-300 injection pen (2-unit dial), Inject 22 Units under the skin daily, Disp: 9 mL, Rfl: 0    Insulin Pen Needle (Pen Needles) 31G X 8 MM MISC, Inject under the skin 4 (four) times a day (with meals and at bedtime), Disp: 100 each, Rfl: 11    lidocaine (XYLOCAINE) 5 % ointment, APPLY TOPICALLY 2GM TWICE A DAY TO AFFECTED AREAS AS NEEDED FOR MILD PAIN, Disp: 250 g, Rfl: 8    Lidocaine Viscous HCl (XYLOCAINE) 2 % mucosal solution, Swish and spit 15 mL 4 (four) times a day as needed for mouth pain or discomfort, Disp: 15 mL, Rfl: 1    Linzess 72 MCG CAPS, TAKE ONE CAPSULE BY MOUTH ONCE DAILY, Disp: 90 capsule, Rfl: 10    ondansetron (ZOFRAN) 8 mg tablet, Take 1 tablet (8 mg total) by mouth every 8 (eight) hours as needed for nausea or vomiting, Disp: 30 tablet, Rfl: 2    oxyCODONE (ROXICODONE) 10 MG TABS, Take 1 tablet (10 mg total) by mouth every 4 (four) hours as needed for moderate pain Max Daily Amount: 60 mg, Disp: 180 tablet, Rfl: 0    pantoprazole (PROTONIX) 40 mg tablet, Take 1 tablet (40 mg total) by mouth 2 (two) times a day before meals, Disp: 180 tablet, Rfl: 1    pregabalin (LYRICA) 100 mg capsule, Take 1 capsule (100 mg total) by mouth 3 (three) times a day, Disp: 90 capsule, Rfl: 1    Probiotic Product (PROBIOTIC-10 PO), Take 1 tablet by mouth daily, Disp: , Rfl:     promethazine (PHENERGAN) 25 mg tablet, Take 1 tablet (25 mg total) by mouth every 6 (six) hours as needed for nausea or vomiting, Disp: 60 tablet, Rfl: 3    Restasis 0 05 % ophthalmic emulsion, , Disp: , Rfl:     scopolamine (TRANSDERM-SCOP) 1 5 mg/3 days TD 72 hr patch, Place 1 patch on the skin every third day, Disp: 10 patch, Rfl: 0    sucralfate (CARAFATE) 1 g/10 mL suspension, TAKE 10ML BY MOUTH THREE TIMES A DAY, Disp: 500 mL, Rfl: 3    Current Facility-Administered Medications:     iohexol (OMNIPAQUE) 300 mg/mL injection 50 mL, 50 mL, Epidural, Once, Geena Bagley DO    methylPREDNISolone acetate (DEPO-MEDROL) injection 80 mg, 80 mg, Epidural, Once, Geena Bagley DO    Allergies   Allergen Reactions    Prozac [Fluoxetine Hcl]      SI    Bactrim [Sulfamethoxazole-Trimethoprim]      Pt "They think that is what cause the pancreatitis"     Flagyl [Metronidazole] Diarrhea and Abdominal Pain    Lamictal [Lamotrigine] GI Intolerance    Lithium Other (See Comments)    Haldol [Haloperidol] Other (See Comments)     "I don't like it"    Ibuprofen     Lexapro [Escitalopram Oxalate] Rash    Navane [Thiothixene]      SI    Other      "novaine?" antipsychotic       Physical Exam: There were no vitals filed for this visit    General: Awake, Alert, Oriented x 3, Mood and affect appropriate  Respiratory: Respirations even and unlabored  Cardiovascular: Peripheral pulses intact; no edema  Musculoskeletal Exam:  Tenderness to palpation bilateral cervical paraspinals    ASA Score: 2       Assessment:   1   Cervical disc disorder with radiculopathy of mid-cervical region        Plan: C7-T1 YANNI with fluoroscopy

## 2022-05-12 ENCOUNTER — APPOINTMENT (EMERGENCY)
Dept: CT IMAGING | Facility: HOSPITAL | Age: 52
End: 2022-05-12
Payer: MEDICARE

## 2022-05-12 ENCOUNTER — HOSPITAL ENCOUNTER (EMERGENCY)
Facility: HOSPITAL | Age: 52
Discharge: HOME | End: 2022-05-12
Attending: EMERGENCY MEDICINE
Payer: MEDICARE

## 2022-05-12 ENCOUNTER — TELEPHONE (OUTPATIENT)
Dept: PAIN MEDICINE | Facility: CLINIC | Age: 52
End: 2022-05-12

## 2022-05-12 VITALS
TEMPERATURE: 98.3 F | SYSTOLIC BLOOD PRESSURE: 151 MMHG | OXYGEN SATURATION: 99 % | HEART RATE: 103 BPM | RESPIRATION RATE: 18 BRPM | DIASTOLIC BLOOD PRESSURE: 86 MMHG

## 2022-05-12 DIAGNOSIS — R10.9 ACUTE ABDOMINAL PAIN: Primary | ICD-10-CM

## 2022-05-12 DIAGNOSIS — R11.2 NAUSEA AND VOMITING: ICD-10-CM

## 2022-05-12 DIAGNOSIS — R19.7 DIARRHEA: ICD-10-CM

## 2022-05-12 DIAGNOSIS — E86.0 DEHYDRATION: ICD-10-CM

## 2022-05-12 LAB
ALBUMIN SERPL BCP-MCNC: 3.4 G/DL (ref 3.5–5)
ALP SERPL-CCNC: 118 U/L (ref 46–116)
ALT SERPL W P-5'-P-CCNC: 22 U/L (ref 12–78)
ANION GAP SERPL CALCULATED.3IONS-SCNC: 10 MMOL/L (ref 4–13)
AST SERPL W P-5'-P-CCNC: 15 U/L (ref 5–45)
BACTERIA UR QL AUTO: ABNORMAL /HPF
BASOPHILS # BLD AUTO: 0.03 THOUSANDS/ΜL (ref 0–0.1)
BASOPHILS NFR BLD AUTO: 0 % (ref 0–1)
BILIRUB SERPL-MCNC: 0.18 MG/DL (ref 0.2–1)
BILIRUB UR QL STRIP: NEGATIVE
BUN SERPL-MCNC: 50 MG/DL (ref 5–25)
CALCIUM ALBUM COR SERPL-MCNC: 9.2 MG/DL (ref 8.3–10.1)
CALCIUM SERPL-MCNC: 8.7 MG/DL (ref 8.3–10.1)
CHLORIDE SERPL-SCNC: 112 MMOL/L (ref 100–108)
CLARITY UR: CLEAR
CO2 SERPL-SCNC: 22 MMOL/L (ref 21–32)
COLOR UR: YELLOW
CREAT SERPL-MCNC: 1.7 MG/DL (ref 0.6–1.3)
EOSINOPHIL # BLD AUTO: 0.03 THOUSAND/ΜL (ref 0–0.61)
EOSINOPHIL NFR BLD AUTO: 0 % (ref 0–6)
ERYTHROCYTE [DISTWIDTH] IN BLOOD BY AUTOMATED COUNT: 13.7 % (ref 11.6–15.1)
EXT PREG TEST URINE: NEGATIVE
EXT. CONTROL ED NAV: NORMAL
GFR SERPL CREATININE-BSD FRML MDRD: 34 ML/MIN/1.73SQ M
GLUCOSE SERPL-MCNC: 116 MG/DL (ref 65–140)
GLUCOSE UR STRIP-MCNC: NEGATIVE MG/DL
HCT VFR BLD AUTO: 39.2 % (ref 34.8–46.1)
HGB BLD-MCNC: 12.8 G/DL (ref 11.5–15.4)
HGB UR QL STRIP.AUTO: ABNORMAL
IMM GRANULOCYTES # BLD AUTO: 0.06 THOUSAND/UL (ref 0–0.2)
IMM GRANULOCYTES NFR BLD AUTO: 1 % (ref 0–2)
KETONES UR STRIP-MCNC: NEGATIVE MG/DL
LEUKOCYTE ESTERASE UR QL STRIP: NEGATIVE
LIPASE SERPL-CCNC: 356 U/L (ref 73–393)
LYMPHOCYTES # BLD AUTO: 1.95 THOUSANDS/ΜL (ref 0.6–4.47)
LYMPHOCYTES NFR BLD AUTO: 15 % (ref 14–44)
MCH RBC QN AUTO: 31 PG (ref 26.8–34.3)
MCHC RBC AUTO-ENTMCNC: 32.7 G/DL (ref 31.4–37.4)
MCV RBC AUTO: 95 FL (ref 82–98)
MONOCYTES # BLD AUTO: 1.02 THOUSAND/ΜL (ref 0.17–1.22)
MONOCYTES NFR BLD AUTO: 8 % (ref 4–12)
NEUTROPHILS # BLD AUTO: 9.65 THOUSANDS/ΜL (ref 1.85–7.62)
NEUTS SEG NFR BLD AUTO: 76 % (ref 43–75)
NITRITE UR QL STRIP: NEGATIVE
NON-SQ EPI CELLS URNS QL MICRO: ABNORMAL /HPF
NRBC BLD AUTO-RTO: 0 /100 WBCS
PH UR STRIP.AUTO: 5.5 [PH] (ref 4.5–8)
PLATELET # BLD AUTO: 263 THOUSANDS/UL (ref 149–390)
PMV BLD AUTO: 10.7 FL (ref 8.9–12.7)
POTASSIUM SERPL-SCNC: 4 MMOL/L (ref 3.5–5.3)
PROT SERPL-MCNC: 7.2 G/DL (ref 6.4–8.2)
PROT UR STRIP-MCNC: ABNORMAL MG/DL
RBC # BLD AUTO: 4.13 MILLION/UL (ref 3.81–5.12)
RBC #/AREA URNS AUTO: ABNORMAL /HPF
SODIUM SERPL-SCNC: 144 MMOL/L (ref 136–145)
SP GR UR STRIP.AUTO: 1.02 (ref 1–1.03)
UROBILINOGEN UR QL STRIP.AUTO: 0.2 E.U./DL
WBC # BLD AUTO: 12.74 THOUSAND/UL (ref 4.31–10.16)
WBC #/AREA URNS AUTO: ABNORMAL /HPF

## 2022-05-12 PROCEDURE — 81025 URINE PREGNANCY TEST: CPT | Performed by: EMERGENCY MEDICINE

## 2022-05-12 PROCEDURE — 80053 COMPREHEN METABOLIC PANEL: CPT | Performed by: EMERGENCY MEDICINE

## 2022-05-12 PROCEDURE — 81001 URINALYSIS AUTO W/SCOPE: CPT

## 2022-05-12 PROCEDURE — 96361 HYDRATE IV INFUSION ADD-ON: CPT

## 2022-05-12 PROCEDURE — 99285 EMERGENCY DEPT VISIT HI MDM: CPT | Performed by: EMERGENCY MEDICINE

## 2022-05-12 PROCEDURE — G1004 CDSM NDSC: HCPCS

## 2022-05-12 PROCEDURE — 83690 ASSAY OF LIPASE: CPT | Performed by: EMERGENCY MEDICINE

## 2022-05-12 PROCEDURE — 74176 CT ABD & PELVIS W/O CONTRAST: CPT

## 2022-05-12 PROCEDURE — 85025 COMPLETE CBC W/AUTO DIFF WBC: CPT | Performed by: EMERGENCY MEDICINE

## 2022-05-12 PROCEDURE — 96374 THER/PROPH/DIAG INJ IV PUSH: CPT

## 2022-05-12 PROCEDURE — 99285 EMERGENCY DEPT VISIT HI MDM: CPT

## 2022-05-12 PROCEDURE — 36415 COLL VENOUS BLD VENIPUNCTURE: CPT | Performed by: EMERGENCY MEDICINE

## 2022-05-12 PROCEDURE — 96372 THER/PROPH/DIAG INJ SC/IM: CPT

## 2022-05-12 RX ORDER — SUCRALFATE ORAL 1 G/10ML
1 SUSPENSION ORAL 4 TIMES DAILY
Qty: 420 ML | Refills: 0 | Status: SHIPPED | OUTPATIENT
Start: 2022-05-12 | End: 2022-06-02

## 2022-05-12 RX ORDER — HYDROMORPHONE HCL IN WATER/PF 6 MG/30 ML
0.2 PATIENT CONTROLLED ANALGESIA SYRINGE INTRAVENOUS ONCE
Status: COMPLETED | OUTPATIENT
Start: 2022-05-12 | End: 2022-05-12

## 2022-05-12 RX ORDER — ONDANSETRON 4 MG/1
4 TABLET, FILM COATED ORAL EVERY 8 HOURS PRN
Qty: 7 TABLET | Refills: 0 | Status: SHIPPED | OUTPATIENT
Start: 2022-05-12 | End: 2022-06-15

## 2022-05-12 RX ORDER — OLANZAPINE 10 MG/1
10 INJECTION, POWDER, LYOPHILIZED, FOR SOLUTION INTRAMUSCULAR ONCE
Status: COMPLETED | OUTPATIENT
Start: 2022-05-12 | End: 2022-05-12

## 2022-05-12 RX ORDER — ONDANSETRON 2 MG/ML
1 INJECTION INTRAMUSCULAR; INTRAVENOUS ONCE
Status: COMPLETED | OUTPATIENT
Start: 2022-05-12 | End: 2022-05-12

## 2022-05-12 RX ORDER — DICYCLOMINE HCL 20 MG
20 TABLET ORAL 2 TIMES DAILY
Qty: 20 TABLET | Refills: 0 | Status: SHIPPED | OUTPATIENT
Start: 2022-05-12 | End: 2022-08-04

## 2022-05-12 RX ADMIN — HYDROMORPHONE HYDROCHLORIDE 0.2 MG: 0.2 INJECTION, SOLUTION INTRAMUSCULAR; INTRAVENOUS; SUBCUTANEOUS at 07:50

## 2022-05-12 RX ADMIN — WATER 2 ML: 1 INJECTION INTRAMUSCULAR; INTRAVENOUS; SUBCUTANEOUS at 07:16

## 2022-05-12 RX ADMIN — SODIUM CHLORIDE 1000 ML: 0.9 INJECTION, SOLUTION INTRAVENOUS at 08:45

## 2022-05-12 RX ADMIN — OLANZAPINE 10 MG: 10 INJECTION, POWDER, LYOPHILIZED, FOR SOLUTION INTRAMUSCULAR at 07:16

## 2022-05-12 NOTE — CASE MANAGEMENT
Case Management ED Progress Note    Patient name José Antonio Escoto ED 32/ED 32 MRN [id number]  : 1970 Date 2022        OBJECTIVE:  Predictive Model Details         96% Factor Value    Risk of Hospital Admission or ED Visit Model Number of ED Visits 5+     Number of Hospitalizations 3     Has Medicaid Yes     Is in Relationship No     Has Chronic Kidney Disease Yes     Has Anemia Yes     Has Depression Yes     Has Diabetes Yes     Has Asthma Yes     Has PCP Yes            Chief Complaint: Vomiting, Abdominal pain   Patient Class: Emergency  Preferred Pharmacy:   [facility] [address]  [address]  [place] [address]  Phone: [phone] Fax: [phone]    Primary Care Provider: Justyna Nolan PA-C    Primary Insurance: [address] [place] [address],[address] [facility]  Secondary Insurance: [address] [place] [address]    ED Progress Note:    Met patient at bedside, patient reports no needs, scheduled for discharge  Care taker to assist with transport

## 2022-05-12 NOTE — ED PROVIDER NOTES
History  Chief Complaint   Patient presents with    Abdominal Pain     Pt brought in by EMS after starting at 0300 with abdominal pain, N/V  Pt has hx of pancreatitis and believes this might be a flare up  Pt given 4mg zofran PTA  EMS   History provided by:  Patient  Abdominal Pain  Pain location:  Generalized  Pain quality: sharp    Pain radiates to:  Does not radiate  Pain severity:  Severe  Onset quality:  Gradual  Duration:  4 hours  Timing:  Constant  Progression:  Worsening  Chronicity:  Recurrent  Relieved by:  Nothing  Worsened by:  Nothing  Associated symptoms: diarrhea (w/o blood/mucus), nausea and vomiting (nbnb)    Associated symptoms: no anorexia, no belching, no chest pain, no constipation, no dysuria, no fatigue, no fever, no hematuria, no shortness of breath and no sore throat        Prior to Admission Medications   Prescriptions Last Dose Informant Patient Reported? Taking?    Alcohol Swabs (Alcohol Pads) 70 % PADS   No No   Sig: Use 4 (four) times a day   Blood Glucose Monitoring Suppl (FreeStyle Lite) DEIRDRE   No No   Sig: Use daily   Diclofenac Sodium (VOLTAREN) 1 %   No No   Sig: Apply 2 g topically 4 (four) times a day   Easy Comfort Lancets MISC   No No   Sig: USE TO TEST THE BLOOD SUGAR THREE TIMES A DAY   Insulin Pen Needle (Pen Needles) 31G X 8 MM MISC   No No   Sig: Inject under the skin 4 (four) times a day (with meals and at bedtime)   Lidocaine Viscous HCl (XYLOCAINE) 2 % mucosal solution   No No   Sig: Swish and spit 15 mL 4 (four) times a day as needed for mouth pain or discomfort   Linzess 72 MCG CAPS   No No   Sig: TAKE ONE CAPSULE BY MOUTH ONCE DAILY   Probiotic Product (PROBIOTIC-10 PO)  Self Yes No   Sig: Take 1 tablet by mouth daily   Restasis 0 05 % ophthalmic emulsion   Yes No   acetaminophen (TYLENOL) 500 mg tablet   No No   Sig: Take 2 tablets (1,000 mg total) by mouth every 8 (eight) hours as needed for mild pain   amLODIPine (NORVASC) 5 mg tablet   No No   Sig: TAKE ONE TABLET BY MOUTH ONCE DAILY   amphetamine-dextroamphetamine (ADDERALL XR) 20 MG 24 hr capsule   No No   Sig: Take 1 capsule (20 mg total) by mouth 2 (two) times a day Max Daily Amount: 40 mg   buPROPion (WELLBUTRIN XL) 300 mg 24 hr tablet   No No   Sig: Take 1 tablet (300 mg total) by mouth every morning   cholecalciferol (VITAMIN D3) 1,000 units tablet   No No   Sig: Take 2 tablets (2,000 Units total) by mouth daily for 20 days   clobetasol (TEMOVATE) 0 05 % cream   No No   Sig: APPLY TOPICALLY TO AFFECTED AREA TWICE A DAY   docusate sodium (COLACE) 100 mg capsule   Yes No   glucose blood (FREESTYLE LITE) test strip   No No   Sig: Use as instructed   hydrOXYzine HCL (ATARAX) 25 mg tablet   Yes No   insulin aspart (NovoLOG FlexPen) 100 UNIT/ML injection pen   No No   Sig: Inject 6 Units under the skin 3 (three) times a day with meals   insulin glargine (Toujeo Max SoloStar) 300 units/mL CONCENTRATED U-300 injection pen (2-unit dial)   No No   Sig: Inject 22 Units under the skin daily   lidocaine (XYLOCAINE) 5 % ointment   No No   Sig: APPLY TOPICALLY 2GM TWICE A DAY TO AFFECTED AREAS AS NEEDED FOR MILD PAIN   ondansetron (ZOFRAN) 8 mg tablet   No No   Sig: Take 1 tablet (8 mg total) by mouth every 8 (eight) hours as needed for nausea or vomiting   oxyCODONE (ROXICODONE) 10 MG TABS   No No   Sig: Take 1 tablet (10 mg total) by mouth every 4 (four) hours as needed for moderate pain Max Daily Amount: 60 mg   pantoprazole (PROTONIX) 40 mg tablet   No No   Sig: Take 1 tablet (40 mg total) by mouth 2 (two) times a day before meals   pregabalin (LYRICA) 100 mg capsule   No No   Sig: Take 1 capsule (100 mg total) by mouth 3 (three) times a day   promethazine (PHENERGAN) 25 mg tablet   No No   Sig: Take 1 tablet (25 mg total) by mouth every 6 (six) hours as needed for nausea or vomiting   scopolamine (TRANSDERM-SCOP) 1 5 mg/3 days TD 72 hr patch   No No   Sig: Place 1 patch on the skin every third day   sucralfate (CARAFATE) 1 g/10 mL suspension   No No   Sig: TAKE 10ML BY MOUTH THREE TIMES A DAY      Facility-Administered Medications: None       Past Medical History:   Diagnosis Date    ADHD     Anemia of chronic disease     Anxiety     Arthritis ?  Asthma     Bipolar disorder ([place] [address] )     Borderline personality disorder ([place] [address] )     Cataplexy     Chronic abdominal pain     Chronic kidney disease ?  CKD (chronic kidney disease) stage 3, GFR 30-59 ml/min (HCC)     Cushing syndrome (HCC)     Depression ?  Diabetes mellitus ([place] [address] )     DVT (deep venous thrombosis) (HCC)     GERD (gastroesophageal reflux disease)     Headache(784 0) 3 months    History of acute pancreatitis     felt secondary to Bactrim    History of transfusion     Hypertension     Liver disease     fatty liver    Microscopic polyangiitis (HCC)     Ovarian cyst     PTSD (post-traumatic stress disorder)     Self-inflicted injury     self inflicted skin wounds    Wegener's granulomatosis with renal involvement ([place] [address] ) 2015       Past Surgical History:   Procedure Laterality Date    ESOPHAGOGASTRODUODENOSCOPY  09/11/2015    mild antral gastritis    GASTRIC STIMULATOR IMPLANT SURGERY  06/25/2020    [facility] COLONOSCOPY FLX DX W/COLLJ SPEC WHEN PFRMD N/A 12/14/2018    adenoma removed from the transverse, hyperplastic polyp removed from the left colon    [facility] ESOPHAGOGASTRODUODENOSCOPY TRANSORAL DIAGNOSTIC N/A 12/14/2018    gastritis and scant coffee-ground material   Biopsies negative for H  pylori    RELEASE SCAR CONTRACTURE / GRAFT REPAIRS OF HAND Bilateral     UPPER GASTROINTESTINAL ENDOSCOPY  12/26/2019    Dr General Carmen   Botox to the pylorus       Family History   Problem Relation Age of Onset    Arthritis Mother     Depression Mother     Diabetes Mother     Mental illness Mother    [facility] Migraines Mother     No Known Problems Father     Colon cancer Neg Hx     Drug abuse Neg Hx         mother father    Mental illness Neg Hx         disorder, mother father    Cancer Neg Hx     Breast cancer Neg Hx      I have reviewed and agree with the history as documented  E-Cigarette/Vaping    E-Cigarette Use Never User      E-Cigarette/Vaping Substances    Nicotine No     THC No     CBD No     Flavoring No     Other No     Unknown No      Social History     Tobacco Use    Smoking status: Former Smoker     Packs/day: 1 00     Years: 10 00     Pack years: 10 00     Types: Cigarettes     Quit date: 2011     Years since quittin 3    Smokeless tobacco: Never Used    Tobacco comment: Stopped smoking 11 years ago   Vaping Use    Vaping Use: Never used   Substance Use Topics    Alcohol use: Never    Drug use: Yes     Types: Marijuana     Comment: marijuana daily       Review of Systems   Constitutional: Negative for activity change, appetite change, fatigue and fever  HENT: Negative for congestion, dental problem, ear pain, rhinorrhea and sore throat  Eyes: Negative for pain and redness  Respiratory: Negative for chest tightness, shortness of breath and wheezing  Cardiovascular: Negative for chest pain and palpitations  Gastrointestinal: Positive for abdominal pain, diarrhea (w/o blood/mucus), nausea and vomiting (nbnb)  Negative for anorexia, blood in stool and constipation  Endocrine: Negative for cold intolerance and heat intolerance  Genitourinary: Negative for dysuria, frequency and hematuria  Musculoskeletal: Negative for arthralgias and myalgias  Skin: Negative for color change, pallor and rash  Neurological: Negative for weakness and numbness  Hematological: Does not bruise/bleed easily  Psychiatric/Behavioral: Negative for agitation, hallucinations and suicidal ideas  Physical Exam  Physical Exam  Constitutional:       Appearance: She is well-developed  HENT:      Head: Normocephalic and atraumatic  Eyes:      Pupils: Pupils are equal, round, and reactive to light  Neck:      Vascular: No JVD  Trachea: No tracheal deviation  Cardiovascular:      Rate and Rhythm: Normal rate and regular rhythm  Pulmonary:      Effort: Pulmonary effort is normal  No tachypnea, accessory muscle usage or respiratory distress  Breath sounds: Normal breath sounds  Abdominal:      General: There is no distension  Tenderness: There is generalized abdominal tenderness  There is no right CVA tenderness, left CVA tenderness, guarding or rebound  Hernia: No hernia is present  Musculoskeletal:      Right lower leg: Normal       Left lower leg: Normal    Skin:     General: Skin is warm  Capillary Refill: Capillary refill takes less than 2 seconds  Coloration: Skin is not cyanotic  Neurological:      Mental Status: She is alert and oriented to person, place, and time     Psychiatric:         Behavior: Behavior normal          Vital Signs  ED Triage Vitals   Temperature Pulse Respirations Blood Pressure SpO2   05/12/22 0645 05/12/22 0645 05/12/22 0645 05/12/22 0645 05/12/22 0645   98 3 °F (36 8 °C) 80 18 (!) 188/83 99 %      Temp Source Heart Rate Source Patient Position - Orthostatic VS BP Location FiO2 (%)   05/12/22 0645 -- -- -- --   Axillary          Pain Score       05/12/22 0744       10 - Worst Possible Pain           Vitals:    05/12/22 0645   BP: (!) 188/83   Pulse: 80         Visual Acuity      ED Medications  Medications   sodium chloride 0 9 % bolus 1,000 mL (has no administration in time range)   ondansetron (FOR EMS ONLY) (ZOFRAN) 4 mg/2 mL injection 4 mg (0 mg Does not apply Given to EMS 5/12/22 0648)   OLANZapine (ZyPREXA) IM injection 10 mg (10 mg Intramuscular Given 5/12/22 0716)   sterile water injection **ADS Override Pull** (2 mL  Given 5/12/22 0716)   HYDROmorphone HCl (DILAUDID) injection 0 2 mg (0 2 mg Intravenous Given 5/12/22 0750)       Diagnostic Studies  Results Reviewed     Procedure Component Value Units Date/Time    Urine Microscopic [[id number]]  (Abnormal) Collected: 05/12/22 0735    Lab Status: Final result Specimen: Urine, Other Updated: 05/12/22 0841     RBC, UA 1-2 /hpf      WBC, UA 1-2 /hpf      Epithelial Cells Occasional /hpf      Bacteria, UA Occasional /hpf     Comprehensive metabolic panel [[id number]]  (Abnormal) Collected: 05/12/22 0719    Lab Status: Final result Specimen: Blood from Arm, Right Updated: 05/12/22 0748     Sodium 144 mmol/L      Potassium 4 0 mmol/L      Chloride 112 mmol/L      CO2 22 mmol/L      ANION GAP 10 mmol/L      BUN 50 mg/dL      Creatinine 1 70 mg/dL      Glucose 116 mg/dL      Calcium 8 7 mg/dL      Corrected Calcium 9 2 mg/dL      AST 15 U/L      ALT 22 U/L      Alkaline Phosphatase 118 U/L      Total Protein 7 2 g/dL      Albumin 3 4 g/dL      Total Bilirubin 0 18 mg/dL      eGFR 34 ml/min/1 73sq m     Narrative:      National Kidney Disease Foundation guidelines for Chronic Kidney Disease (CKD):     Stage 1 with normal or high GFR (GFR > 90 mL/min/1 73 square meters)    Stage 2 Mild CKD (GFR = 60-89 mL/min/1 73 square meters)    Stage 3A Moderate CKD (GFR = 45-59 mL/min/1 73 square meters)    Stage 3B Moderate CKD (GFR = 30-44 mL/min/1 73 square meters)    Stage 4 Severe CKD (GFR = 15-29 mL/min/1 73 square meters)    Stage 5 End Stage CKD (GFR <15 mL/min/1 73 square meters)  Note: GFR calculation is accurate only with a steady state creatinine    Lipase [[id number]]  (Normal) Collected: 05/12/22 0719    Lab Status: Final result Specimen: Blood from Arm, Right Updated: 05/12/22 0748     Lipase 356 u/L     POCT pregnancy, urine [[id number]]  (Normal) Resulted: 05/12/22 0739    Lab Status: Final result Updated: 05/12/22 0739     EXT PREG TEST UR (Ref: Negative) Negative     Control Valid    Urine Macroscopic, POC [[id number]]  (Abnormal) Collected: 05/12/22 0735    Lab Status: Final result Specimen: Urine Updated: 05/12/22 0737     Color, UA Yellow     Clarity, UA Clear     pH, UA 5 5     Leukocytes, UA Negative     Nitrite, UA Negative Protein,  (2+) mg/dl      Glucose, UA Negative mg/dl      Ketones, UA Negative mg/dl      Urobilinogen, UA 0 2 E U /dl      Bilirubin, UA Negative     Blood, UA Trace     Specific [place], UA 1 025    Narrative:      CLINITEK RESULT    CBC and differential [[id number]]  (Abnormal) Collected: 05/12/22 0719    Lab Status: Final result Specimen: Blood from Arm, Right Updated: 05/12/22 0729     WBC 12 74 Thousand/uL      RBC 4 13 Million/uL      Hemoglobin 12 8 g/dL      Hematocrit 39 2 %      MCV 95 fL      MCH 31 0 pg      MCHC 32 7 g/dL      RDW 13 7 %      MPV 10 7 fL      Platelets 819 Thousands/uL      nRBC 0 /100 WBCs      Neutrophils Relative 76 %      Immat GRANS % 1 %      Lymphocytes Relative 15 %      Monocytes Relative 8 %      Eosinophils Relative 0 %      Basophils Relative 0 %      Neutrophils Absolute 9 65 Thousands/µL      Immature Grans Absolute 0 06 Thousand/uL      Lymphocytes Absolute 1 95 Thousands/µL      Monocytes Absolute 1 02 Thousand/µL      Eosinophils Absolute 0 03 Thousand/µL      Basophils Absolute 0 03 Thousands/µL                  CT abdomen pelvis wo contrast   Final Result by Johanna Taylor MD (05/12 2025)      No acute intra-abdominal abnormality  Colonic diverticulosis  Workstation performed: [id number]                    Procedures  Procedures         ED Course  ED Course as of 05/12/22 0859   Thu May 12, 2022   0489 Work up reviewed and benign  Pt will be reassured, counselled, dc'd to home with symptomatic treatment  SBIRT 20yo+    Flowsheet Row Most Recent Value   SBIRT (25 yo +)    In order to provide better care to our patients, we are screening all of our patients for alcohol and drug use  Would it be okay to ask you these screening questions?  No Filed at: 05/12/2022 0722                    MDM  Number of Diagnoses or Management Options  Diagnosis management comments: Acute abd pain n/v/d-will do abd labs, urine dip, upreg, ivfs, tx symtpoms, ct a/p to r/o acute pathology, prn pain meds      Disposition  Final diagnoses:   Acute abdominal pain   Nausea and vomiting   Diarrhea   Dehydration     Time reflects when diagnosis was documented in both MDM as applicable and the Disposition within this note     Time User Action Codes Description Comment    5/12/2022  8:57 AM Elfego Live Add [R10 9] Acute abdominal pain     5/12/2022  8:57 AM [email or web address] Infante Add [R11 2] Nausea and vomiting     5/12/2022  8:57 AM Tejinder PALACIOS Add [R19 7] Diarrhea     5/12/2022  8:57 AM Elfego Live Add [E86 0] Dehydration       ED Disposition     ED Disposition   Discharge    Condition   Stable    Date/Time   Thu May 12, 2022  8:57 AM    Comment   Lydia Sandoval discharge to home/self care  Follow-up Information    None         Patient's Medications   Discharge Prescriptions    DICYCLOMINE (BENTYL) 20 MG TABLET    Take 1 tablet (20 mg total) by mouth in the morning and 1 tablet (20 mg total) in the evening  Do all this for 7 days  Start Date: 5/12/2022 End Date: 5/19/2022       Order Dose: 20 mg       Quantity: 20 tablet    Refills: 0    ONDANSETRON (ZOFRAN) 4 MG TABLET    Take 1 tablet (4 mg total) by mouth every 8 (eight) hours as needed for nausea or vomiting for up to 7 doses       Start Date: 5/12/2022 End Date: --       Order Dose: 4 mg       Quantity: 7 tablet    Refills: 0    SUCRALFATE (CARAFATE) 1 G/10 ML SUSPENSION    Take 10 mL (1 g total) by mouth in the morning and 10 mL (1 g total) at noon and 10 mL (1 g total) in the evening and 10 mL (1 g total) before bedtime  Do all this for 7 days  Start Date: 5/12/2022 End Date: 5/19/2022       Order Dose: 1 g       Quantity: 420 mL    Refills: 0       No discharge procedures on file      PDMP Review       Value Time User    PDMP Reviewed  Yes 4/19/2022  3:13 PM Nazia Valle PA-C          ED Provider  Electronically Signed by           Jorge Villalta MD  05/12/22 9019

## 2022-05-13 ENCOUNTER — HOSPITAL ENCOUNTER (EMERGENCY)
Facility: HOSPITAL | Age: 52
Discharge: HOME | End: 2022-05-13
Attending: EMERGENCY MEDICINE | Admitting: EMERGENCY MEDICINE
Payer: MEDICARE

## 2022-05-13 VITALS
OXYGEN SATURATION: 98 % | RESPIRATION RATE: 20 BRPM | DIASTOLIC BLOOD PRESSURE: 82 MMHG | HEART RATE: 61 BPM | HEIGHT: 62 IN | SYSTOLIC BLOOD PRESSURE: 138 MMHG | BODY MASS INDEX: 26.67 KG/M2 | TEMPERATURE: 99.2 F

## 2022-05-13 DIAGNOSIS — M79.644 PAIN OF RIGHT MIDDLE FINGER: Primary | ICD-10-CM

## 2022-05-13 DIAGNOSIS — F12.288 CANNABIS HYPEREMESIS SYNDROME CONCURRENT WITH AND DUE TO CANNABIS DEPENDENCE (HCC): Primary | ICD-10-CM

## 2022-05-13 LAB — GLUCOSE SERPL-MCNC: 97 MG/DL (ref 65–140)

## 2022-05-13 PROCEDURE — 96372 THER/PROPH/DIAG INJ SC/IM: CPT

## 2022-05-13 PROCEDURE — 96360 HYDRATION IV INFUSION INIT: CPT

## 2022-05-13 PROCEDURE — 99285 EMERGENCY DEPT VISIT HI MDM: CPT | Performed by: EMERGENCY MEDICINE

## 2022-05-13 PROCEDURE — 82948 REAGENT STRIP/BLOOD GLUCOSE: CPT

## 2022-05-13 PROCEDURE — 99284 EMERGENCY DEPT VISIT MOD MDM: CPT

## 2022-05-13 RX ORDER — HALOPERIDOL 5 MG/ML
5 INJECTION INTRAMUSCULAR ONCE
Status: COMPLETED | OUTPATIENT
Start: 2022-05-13 | End: 2022-05-13

## 2022-05-13 RX ADMIN — SODIUM CHLORIDE 1000 ML: 0.9 INJECTION, SOLUTION INTRAVENOUS at 17:29

## 2022-05-13 RX ADMIN — HALOPERIDOL LACTATE 5 MG: 5 INJECTION, SOLUTION INTRAMUSCULAR at 17:29

## 2022-05-13 NOTE — ED ATTENDING ATTESTATION
5/13/2022  IJonh DO, saw and evaluated the patient  I have discussed the patient with the resident/non-physician practitioner and agree with the resident's/non-physician practitioner's findings, Plan of Care, and MDM as documented in the resident's/non-physician practitioner's note, except where noted  All available labs and Radiology studies were reviewed  I was present for key portions of any procedure(s) performed by the resident/non-physician practitioner and I was immediately available to provide assistance  At this point I agree with the current assessment done in the Emergency Department  I have conducted an independent evaluation of this patient a history and physical is as follows:    ED Course         Critical Care Time  Procedures    66-year-old female with history of diabetic gastroparesis, cannabis hyperemesis presents to the ED with recurrence of nausea, vomiting, diarrhea and epigastric pain  She has been seen here numerous times for the same and was seen here yesterday for the symptoms  She had a full workup and was discharged home  She states that at 3:00 a m  The vomiting recurred  She states she did smoke marijuana this morning and took a hot shower which seemed to help some of her symptoms  On exam she is awake and alert does not appear acutely ill  Her mucous membranes are moist   Heart is regular without murmur  Lungs are clear  Abdomen is soft, nondistended with epigastric tenderness without peritoneal signs  Bowel sounds are normal   No suspicion for acute abdominal process at this time  Possible hyperemesis from cannabis use or her gastroparesis  Will give IV fluids, IM Haldol, check blood sugar and urinalysis to assess ketones  Will re-evaluate

## 2022-05-13 NOTE — TELEPHONE ENCOUNTER
I spoke with patient and she will try hand therapy  She has an appt with Dr Sahara Patel scheduled on 5/27

## 2022-05-13 NOTE — DISCHARGE INSTRUCTIONS
Thank you for coming to the ED for your care  We believe that part of your recurrent vomiting and discomfort is likely related to "Cannabis Hyperemesis syndrome"  We recommend stopping cannabis use to help with this  Symptoms generally last for 1-2 days after cannabis use and the only way to treat this is with complete stoppage of use  You can also attempt to get capsaicin at the local pharmacy to help with the abdominal pain  Please return to the ED if you develop blood in your vomit, fevers, or other concerning symptoms

## 2022-05-13 NOTE — Clinical Note
Lydia Sandoval was seen and treated in our emergency department on 5/13/2022  Diagnosis:     Jensen Portillo  is off the rest of the shift today  She may return on this date: If you have any questions or concerns, please don't hesitate to call        Zulema Oliva MD    ______________________________           _______________          _______________  Hospital Representative                              Date                                Time

## 2022-05-13 NOTE — ED PROVIDER NOTES
History  Chief Complaint   Patient presents with    Abdominal Pain     Pt arrived via EMS  Pt c/o ULQ pain that radiates to center of back  Pt reports N/V/D  Pt was seen in our ED last night for similar issue  46 y o F w/ h/o recurrent pancreatitis presenting with LUQ pain that radiates to back  Was seen yesterday for similar problem with negative w/u and discharged  Ponce generally well until about 0300 today when she started having recurrence of symptoms  She has been unable to tolerate PO intake with multiple bouts of clear emesis  She hasn't had recurrence of diarrhea  Patient smokes marijuana almost daily and notes that today it did help with her symptoms transiently today  She also took a shower this morning that seemed to improve her symptoms  She has had subjective cold sweats but denies measured fevers, cough, headache, urinary or other subjective symptoms  Prior to Admission Medications   Prescriptions Last Dose Informant Patient Reported? Taking?    Alcohol Swabs (Alcohol Pads) 70 % PADS   No No   Sig: Use 4 (four) times a day   Blood Glucose Monitoring Suppl (FreeStyle Lite) DEIRDRE   No No   Sig: Use daily   Diclofenac Sodium (VOLTAREN) 1 %   No No   Sig: Apply 2 g topically 4 (four) times a day   Easy Comfort Lancets MISC   No No   Sig: USE TO TEST THE BLOOD SUGAR THREE TIMES A DAY   Insulin Pen Needle (Pen Needles) 31G X 8 MM MISC   No No   Sig: Inject under the skin 4 (four) times a day (with meals and at bedtime)   Lidocaine Viscous HCl (XYLOCAINE) 2 % mucosal solution   No No   Sig: Swish and spit 15 mL 4 (four) times a day as needed for mouth pain or discomfort   Linzess 72 MCG CAPS   No No   Sig: TAKE ONE CAPSULE BY MOUTH ONCE DAILY   Probiotic Product (PROBIOTIC-10 PO)  Self Yes No   Sig: Take 1 tablet by mouth daily   Restasis 0 05 % ophthalmic emulsion   Yes No   acetaminophen (TYLENOL) 500 mg tablet   No No   Sig: Take 2 tablets (1,000 mg total) by mouth every 8 (eight) hours as needed for mild pain   amLODIPine (NORVASC) 5 mg tablet   No No   Sig: TAKE ONE TABLET BY MOUTH ONCE DAILY   amphetamine-dextroamphetamine (ADDERALL XR) 20 MG 24 hr capsule   No No   Sig: Take 1 capsule (20 mg total) by mouth 2 (two) times a day Max Daily Amount: 40 mg   buPROPion (WELLBUTRIN XL) 300 mg 24 hr tablet   No No   Sig: Take 1 tablet (300 mg total) by mouth every morning   cholecalciferol (VITAMIN D3) 1,000 units tablet   No No   Sig: Take 2 tablets (2,000 Units total) by mouth daily for 20 days   clobetasol (TEMOVATE) 0 05 % cream   No No   Sig: APPLY TOPICALLY TO AFFECTED AREA TWICE A DAY   dicyclomine (BENTYL) 20 mg tablet   No No   Sig: Take 1 tablet (20 mg total) by mouth in the morning and 1 tablet (20 mg total) in the evening  Do all this for 7 days     docusate sodium (COLACE) 100 mg capsule   Yes No   glucose blood (FREESTYLE LITE) test strip   No No   Sig: Use as instructed   hydrOXYzine HCL (ATARAX) 25 mg tablet   Yes No   insulin aspart (NovoLOG FlexPen) 100 UNIT/ML injection pen   No No   Sig: Inject 6 Units under the skin 3 (three) times a day with meals   insulin glargine (Toujeo Max SoloStar) 300 units/mL CONCENTRATED U-300 injection pen (2-unit dial)   No No   Sig: Inject 22 Units under the skin daily   lidocaine (XYLOCAINE) 5 % ointment   No No   Sig: APPLY TOPICALLY 2GM TWICE A DAY TO AFFECTED AREAS AS NEEDED FOR MILD PAIN   ondansetron (ZOFRAN) 4 mg tablet   No No   Sig: Take 1 tablet (4 mg total) by mouth every 8 (eight) hours as needed for nausea or vomiting for up to 7 doses   ondansetron (ZOFRAN) 8 mg tablet   No No   Sig: Take 1 tablet (8 mg total) by mouth every 8 (eight) hours as needed for nausea or vomiting   oxyCODONE (ROXICODONE) 10 MG TABS   No No   Sig: Take 1 tablet (10 mg total) by mouth every 4 (four) hours as needed for moderate pain Max Daily Amount: 60 mg   pantoprazole (PROTONIX) 40 mg tablet   No No   Sig: Take 1 tablet (40 mg total) by mouth 2 (two) times a day before meals   pregabalin (LYRICA) 100 mg capsule   No No   Sig: Take 1 capsule (100 mg total) by mouth 3 (three) times a day   promethazine (PHENERGAN) 25 mg tablet   No No   Sig: Take 1 tablet (25 mg total) by mouth every 6 (six) hours as needed for nausea or vomiting   scopolamine (TRANSDERM-SCOP) 1 5 mg/3 days TD 72 hr patch   No No   Sig: Place 1 patch on the skin every third day   sucralfate (CARAFATE) 1 g/10 mL suspension   No No   Sig: TAKE 10ML BY MOUTH THREE TIMES A DAY   sucralfate (CARAFATE) 1 g/10 mL suspension   No No   Sig: Take 10 mL (1 g total) by mouth in the morning and 10 mL (1 g total) at noon and 10 mL (1 g total) in the evening and 10 mL (1 g total) before bedtime  Do all this for 7 days  Facility-Administered Medications: None       Past Medical History:   Diagnosis Date    ADHD     Anemia of chronic disease     Anxiety     Arthritis ?  Asthma     B[facility]     Borderline personality disorder [facility]     Cataplexy     Chronic abdominal pain     Chronic kidney disease ?  CKD (chronic kidney disease) stage 3, GFR 30-59 ml/min (HCC)     Cushing syndrome (HCC)     Depression ?     [facility]     DVT (deep venous thrombosis) (HCC)     GERD (gastroesophageal reflux disease)     Headache(784 0) 3 months    History of acute pancreatitis     felt secondary to Bactrim    History of transfusion     Hypertension     Liver disease     fatty liver    Microscopic polyangiitis (HCC)     Ovarian cyst     PTSD (post-traumatic stress disorder)     Self-inflicted injury     self inflicted skin wounds    Wegener's granulomatosis with renal involvement ([facility] ) 2015       Past Surgical History:   Procedure Laterality Date    ESOPHAGOGASTRODUODENOSCOPY  09/11/2015    mild antral gastritis    GASTRIC STIMULATOR IMPLANT SURGERY  06/25/2020    DE COLONOSCOPY FLX DX W/COLLJ SPEC WHEN PFRMD N/A 12/14/2018    adenoma removed from the transverse, hyperplastic polyp removed from the left colon    [place] ESOPHAGOGASTRODUODENOSCOPY TRANSORAL DIAGNOSTIC N/A 2018    gastritis and scant coffee-ground material   Biopsies negative for H  pylori    RELEASE SCAR CONTRACTURE / GRAFT REPAIRS OF HAND Bilateral     UPPER GASTROINTESTINAL ENDOSCOPY  2019    Dr Lee Ann Arguetaox to the pylorus       Family History   Problem Relation Age of Onset    Arthritis Mother     Depression Mother     Diabetes Mother     Mental illness Mother    [facility] Mother     No Known Problems Father     Colon cancer Neg Hx     Drug abuse Neg Hx         mother father    Mental illness Neg Hx         disorder, mother father    Cancer Neg Hx     Breast cancer Neg Hx      I have reviewed and agree with the history as documented  E-Cigarette/Vaping    E-Cigarette Use Never User      E-Cigarette/Vaping Substances    Nicotine No     THC No     CBD No     Flavoring No     Other No     Unknown No      Social History     Tobacco Use    Smoking status: Former Smoker     Packs/day:      Years: 10 00     Pack years: 10      Types: Cigarettes     Quit date: 2011     Years since quittin 3    Smokeless tobacco: Never Used    Tobacco comment: Stopped smoking 11 years ago   Vaping Use    Vaping Use: Never used   Substance Use Topics    Alcohol use: Never    Drug use: Yes     Types: Marijuana     Comment: marijuana daily        Review of Systems   All other systems reviewed and are negative        Physical Exam  ED Triage Vitals [22 1624]   Temperature Pulse Respirations Blood Pressure SpO2   99 2 °F (37 3 °C) 62 18 123/77 97 %      Temp Source Heart Rate Source Patient Position - Orthostatic VS BP Location FiO2 (%)   Oral Monitor Lying Right arm --      Pain Score       8             Orthostatic Vital Signs  Vitals:    22 1624 22 1845   BP: 123/77 138/82   Pulse: 62 61   Patient Position - Orthostatic VS: Lying Lying       Physical Exam  Vitals and nursing note reviewed  Constitutional:       General: She is not in acute distress  Appearance: She is well-developed  HENT:      Head: Normocephalic and atraumatic  Right Ear: External ear normal       Left Ear: External ear normal       Nose: Nose normal       Mouth/Throat:      Mouth: Mucous membranes are moist    Eyes:      Conjunctiva/sclera: Conjunctivae normal    Cardiovascular:      Rate and Rhythm: Normal rate and regular rhythm  Heart sounds: No murmur heard  Pulmonary:      Effort: Pulmonary effort is normal  No respiratory distress  Breath sounds: Normal breath sounds  Abdominal:      General: There is no distension  Palpations: Abdomen is soft  Tenderness: There is abdominal tenderness (Epigastric and LUQ  No peritoneal signs  )  There is no guarding or rebound  Musculoskeletal:      Cervical back: Neck supple  Right lower leg: No edema  Left lower leg: No edema  Skin:     General: Skin is warm and dry  Neurological:      General: No focal deficit present  Mental Status: She is alert  Psychiatric:         Mood and Affect: Mood normal          ED Medications  Medications   sodium chloride 0 9 % bolus 1,000 mL (0 mL Intravenous Stopped 5/13/22 1845)   haloperidol lactate (HALDOL) injection 5 mg (5 mg Intramuscular Given 5/13/22 1729)       Diagnostic Studies  Results Reviewed     Procedure Component Value Units Date/Time    Fingerstick Glucose (POCT) [[id number]]  (Normal) Collected: 05/13/22 1716    Lab Status: Final result Updated: 05/13/22 1717     POC Glucose 97 mg/dl                  No orders to display         Procedures  Procedures      ED Course                                       MDM  Number of Diagnoses or Management Options  Cannabis hyperemesis syndrome concurrent with and due to cannabis dependence [facility])  Diagnosis management comments: 46 y o F w/ epigastric abdominal pain   Patient had full workup yesterday which was reviewed by me and had no acute findings other than mild leukocytosis which is non-specific  Due to full workup, no need at this time for repeat labs  Symptoms and history are consistent with Marijuana hyperemesis syndrome  Will trial haldol 5 IM and reevaluate prior to other medications  Also started IVF  On reevaluation, patient reports improvement in symptoms  Passed PO challenge  Discussed likelihood that this is related to chronic cannabis use and treatment for this being cessation  Patient agreeable with plan to attempt cessation and will followup w/ PCP regarding alternatives to treat her anxiety  Discharged in stable condition  Disposition  Final diagnoses:   Cannabis hyperemesis syndrome concurrent with and due to cannabis dependence [facility])     Time reflects when diagnosis was documented in both MDM as applicable and the Disposition within this note     Time User Action Codes Description Comment    5/13/2022  6:31 PM Brayan Liu Add [F12 [id number]] Cannabis hyperemesis syndrome concurrent with and due to cannabis dependence [facility])       ED Disposition     ED Disposition   Discharge    Condition   Stable    Date/Time   Fri May 13, 2022  6:30 PM    Comment   Yolande Goltz discharge to home/self care                 Follow-up Information    None         Discharge Medication List as of 5/13/2022  6:34 PM      CONTINUE these medications which have NOT CHANGED    Details   acetaminophen (TYLENOL) 500 mg tablet Take 2 tablets (1,000 mg total) by mouth every 8 (eight) hours as needed for mild pain, Starting Mon 2/28/2022, Normal      Alcohol Swabs (Alcohol Pads) 70 % PADS Use 4 (four) times a day, Starting Wed 12/1/2021, Normal      amLODIPine (NORVASC) 5 mg tablet TAKE ONE TABLET BY MOUTH ONCE DAILY, Normal      amphetamine-dextroamphetamine (ADDERALL XR) 20 MG 24 hr capsule Take 1 capsule (20 mg total) by mouth 2 (two) times a day Max Daily Amount: 40 mg, Starting Tue 4/19/2022, Normal      Blood Glucose Monitoring Suppl (FreeStyle Lite) DEIRDRE Use daily, Starting Fri 7/23/2021, Normal      buPROPion (WELLBUTRIN XL) 300 mg 24 hr tablet Take 1 tablet (300 mg total) by mouth every morning, Starting Tue 1/18/2022, Normal      cholecalciferol (VITAMIN D3) 1,000 units tablet Take 2 tablets (2,000 Units total) by mouth daily for 20 days, Starting Wed 2/16/2022, Until Mon 4/18/2022, Normal      clobetasol (TEMOVATE) 0 05 % cream APPLY TOPICALLY TO AFFECTED AREA TWICE A DAY, Normal      Diclofenac Sodium (VOLTAREN) 1 % Apply 2 g topically 4 (four) times a day, Starting Wed 4/6/2022, Normal      dicyclomine (BENTYL) 20 mg tablet Take 1 tablet (20 mg total) by mouth in the morning and 1 tablet (20 mg total) in the evening  Do all this for 7 days  , Starting Thu 5/12/2022, Until Thu 5/19/2022, Normal      docusate sodium (COLACE) 100 mg capsule Starting Wed 3/23/2022, Historical Med      Easy Comfort Lancets MISC USE TO TEST THE BLOOD SUGAR THREE TIMES A DAY, Normal      glucose blood (FREESTYLE LITE) test strip Use as instructed, Normal      hydrOXYzine HCL (ATARAX) 25 mg tablet Starting Fri 1/14/2022, Historical Med      insulin aspart (NovoLOG FlexPen) 100 UNIT/ML injection pen Inject 6 Units under the skin 3 (three) times a day with meals, Starting Mon 3/7/2022, Normal      insulin glargine (Toujeo Max SoloStar) 300 units/mL CONCENTRATED U-300 injection pen (2-unit dial) Inject 22 Units under the skin daily, Starting Mon 3/7/2022, Normal      Insulin Pen Needle (Pen Needles) 31G X 8 MM MISC Inject under the skin 4 (four) times a day (with meals and at bedtime), Starting Tue 3/1/2022, Normal      lidocaine (XYLOCAINE) 5 % ointment APPLY TOPICALLY 2GM TWICE A DAY TO AFFECTED AREAS AS NEEDED FOR MILD PAIN, Normal      Lidocaine Viscous HCl (XYLOCAINE) 2 % mucosal solution Swish and spit 15 mL 4 (four) times a day as needed for mouth pain or discomfort, Starting Fri 7/16/2021, Normal      Linzess 72 MCG CAPS TAKE ONE CAPSULE BY MOUTH ONCE DAILY, Normal !! ondansetron (ZOFRAN) 4 mg tablet Take 1 tablet (4 mg total) by mouth every 8 (eight) hours as needed for nausea or vomiting for up to 7 doses, Starting Thu 5/12/2022, Normal      !! ondansetron (ZOFRAN) 8 mg tablet Take 1 tablet (8 mg total) by mouth every 8 (eight) hours as needed for nausea or vomiting, Starting Tue 3/29/2022, Normal      oxyCODONE (ROXICODONE) 10 MG TABS Take 1 tablet (10 mg total) by mouth every 4 (four) hours as needed for moderate pain Max Daily Amount: 60 mg, Starting Tue 4/19/2022, Normal      pantoprazole (PROTONIX) 40 mg tablet Take 1 tablet (40 mg total) by mouth 2 (two) times a day before meals, Starting Tue 1/18/2022, Normal      pregabalin (LYRICA) 100 mg capsule Take 1 capsule (100 mg total) by mouth 3 (three) times a day, Starting Tue 5/3/2022, Normal      Probiotic Product (PROBIOTIC-10 PO) Take 1 tablet by mouth daily, Historical Med      promethazine (PHENERGAN) 25 mg tablet Take 1 tablet (25 mg total) by mouth every 6 (six) hours as needed for nausea or vomiting, Starting Mon 5/10/2021, Normal      Restasis 0 05 % ophthalmic emulsion Starting Wed 12/29/2021, Historical Med      scopolamine (TRANSDERM-SCOP) 1 5 mg/3 days TD 72 hr patch Place 1 patch on the skin every third day, Starting Tue 4/27/2021, Normal      !! sucralfate (CARAFATE) 1 g/10 mL suspension TAKE 10ML BY MOUTH THREE TIMES A DAY, Normal      !! sucralfate (CARAFATE) 1 g/10 mL suspension Take 10 mL (1 g total) by mouth in the morning and 10 mL (1 g total) at noon and 10 mL (1 g total) in the evening and 10 mL (1 g total) before bedtime  Do all this for 7 days  , Starting Thu 5/12/2022, Until Thu 5/19/2022, Normal       !! - Potential duplicate medications found  Please discuss with provider  No discharge procedures on file  PDMP Review       Value Time User    PDMP Reviewed  Yes 4/19/2022  3:13 PM Keshawn Galloway PA-C           ED Provider  Attending physically available and evaluated Neeraj Pulido  I managed the patient along with the ED Attending      Electronically Signed by         Jeffrey Cooley MD  05/13/22 4432

## 2022-05-15 DIAGNOSIS — F31.9 BIPOLAR 1 DISORDER (HCC): ICD-10-CM

## 2022-05-15 DIAGNOSIS — M31.31 GRANULOMATOSIS WITH POLYANGIITIS WITH RENAL INVOLVEMENT (HCC): Chronic | ICD-10-CM

## 2022-05-15 DIAGNOSIS — G89.4 CHRONIC PAIN SYNDROME: ICD-10-CM

## 2022-05-16 RX ORDER — DEXTROAMPHETAMINE SACCHARATE, AMPHETAMINE ASPARTATE MONOHYDRATE, DEXTROAMPHETAMINE SULFATE AND AMPHETAMINE SULFATE 5; 5; 5; 5 MG/1; MG/1; MG/1; MG/1
20 CAPSULE, EXTENDED RELEASE ORAL 2 TIMES DAILY
Qty: 60 CAPSULE | Refills: 0 | Status: SHIPPED | OUTPATIENT
Start: 2022-05-16 | End: 2022-06-12 | Stop reason: SDUPTHER

## 2022-05-16 RX ORDER — OXYCODONE HYDROCHLORIDE 10 MG/1
10 TABLET ORAL EVERY 4 HOURS PRN
Qty: 180 TABLET | Refills: 0 | Status: SHIPPED | OUTPATIENT
Start: 2022-05-16 | End: 2022-06-12 | Stop reason: SDUPTHER

## 2022-05-31 DIAGNOSIS — R21 RASH: ICD-10-CM

## 2022-06-02 ENCOUNTER — OFFICE VISIT (OUTPATIENT)
Dept: PAIN MEDICINE | Facility: CLINIC | Age: 52
End: 2022-06-02
Payer: MEDICARE

## 2022-06-02 VITALS — WEIGHT: 145 LBS | BODY MASS INDEX: 26.68 KG/M2 | HEIGHT: 62 IN

## 2022-06-02 DIAGNOSIS — M79.605 LOW BACK PAIN RADIATING TO LEFT LOWER EXTREMITY: ICD-10-CM

## 2022-06-02 DIAGNOSIS — G89.4 CHRONIC PAIN SYNDROME: Primary | ICD-10-CM

## 2022-06-02 DIAGNOSIS — M54.50 LOW BACK PAIN RADIATING TO LEFT LOWER EXTREMITY: ICD-10-CM

## 2022-06-02 DIAGNOSIS — G89.29 CHRONIC LEFT HIP PAIN: ICD-10-CM

## 2022-06-02 DIAGNOSIS — M54.12 CERVICAL RADICULOPATHY: Chronic | ICD-10-CM

## 2022-06-02 DIAGNOSIS — G89.29 CHRONIC RIGHT HIP PAIN: ICD-10-CM

## 2022-06-02 DIAGNOSIS — M25.551 CHRONIC RIGHT HIP PAIN: ICD-10-CM

## 2022-06-02 DIAGNOSIS — M25.552 CHRONIC LEFT HIP PAIN: ICD-10-CM

## 2022-06-02 DIAGNOSIS — M48.02 CERVICAL SPINAL STENOSIS: ICD-10-CM

## 2022-06-02 DIAGNOSIS — M79.18 MYOFASCIAL PAIN SYNDROME: ICD-10-CM

## 2022-06-02 DIAGNOSIS — M47.816 LUMBAR SPONDYLOSIS: ICD-10-CM

## 2022-06-02 PROCEDURE — 99214 OFFICE O/P EST MOD 30 MIN: CPT | Performed by: NURSE PRACTITIONER

## 2022-06-02 RX ORDER — CLOBETASOL PROPIONATE 0.5 MG/G
CREAM TOPICAL
Qty: 60 G | Refills: 0 | Status: SHIPPED | OUTPATIENT
Start: 2022-06-02 | End: 2022-06-30

## 2022-06-02 NOTE — PROGRESS NOTES
Assessment:  1  Chronic pain syndrome    2  Cervical radiculopathy    3  Cervical spinal stenosis    4  Low back pain radiating to left lower extremity    5  Myofascial pain syndrome    6  Lumbar spondylosis    7  Chronic right hip pain    8  Chronic left hip pain        Plan:  While the patient was in the office today, I did have a thorough conversation regarding their chronic pain syndrome, medication management, and treatment plan options  Patient is being seen for follow-up visit  She recently underwent a 2nd C7-T1 interlaminar epidural steroid injection on 05/05/2022  Overall, she is reporting about 70% improvement in her arm symptoms  There is been about 20-25% improvement in her neck pain  Her biggest complaint during today's visit is worsening low back and worsening right hip pain  Will order an x-ray of her right hip to rule out significant degenerative changes  Will call patient with x-ray results as soon as they are available to review  While patient was in x-ray, she also requested an x-ray of her left hip stating that it is usually her left hip that is most bothersome  As such, I placed an order for an x-ray of her left hip  Schedule ultrasound-guided lumbar paraspinal trigger point injections in the near future  Continue Lyrica 100 mg 3 times daily  She did not require refill this medication during today's visit  Continue oxycodone 10 mg as prescribed by her PCP  Follow-up 1 month after the injection  History of Present Illness: The patient is a 46 y o  female who presents for a follow up office visit in regards to Neck Pain, Back Pain, Hip Pain, and Foot Pain  The patients current symptoms include complaints of neck pain and upper extremity pain  Low back pain, right groin pain  Current pain level is a 10/10  Quality pain is described as sharp, throbbing, pressure-like, shooting  Current pain medications includes:  PCP prescribed oxycodone    We prescribed Lyrica 100 mg 3 times daily    The patient reports that this regimen is providing 25 % pain relief  The patient is reporting no side effects from this pain medication regimen  I have personally reviewed and/or updated the patient's past medical history, past surgical history, family history, social history, current medications, allergies, and vital signs today  Review of Systems  Review of Systems   Constitutional: Negative for chills and fever  HENT: Negative for ear pain and sore throat  Eyes: Negative for pain and visual disturbance  Respiratory: Negative for cough and shortness of breath  Cardiovascular: Negative for chest pain and palpitations  Gastrointestinal: Negative for abdominal pain and vomiting  Genitourinary: Negative for dysuria and hematuria  Musculoskeletal: Positive for gait problem and myalgias  Negative for arthralgias and back pain  Decreased range of motion  Joint stiffness     Skin: Negative for color change and rash  Neurological: Positive for dizziness  Negative for seizures and syncope  All other systems reviewed and are negative  Past Medical History:   Diagnosis Date    ADHD     Anemia of chronic disease     Anxiety     Arthritis ?  Asthma     [facility]     Borderline personality disorder ([facility] )     Cataplexy     Chronic abdominal pain     Chronic kidney disease ?  CKD (chronic kidney disease) stage 3, GFR 30-59 ml/min (HCC)     Cushing syndrome (HCC)     Depression ?     Diabetes mellitus ([facility]     DVT (deep venous thrombosis) (HCC)     GERD (gastroesophageal reflux disease)     Headache(784 0) 3 months    History of acute pancreatitis     felt secondary to Bactrim    History of transfusion     Hypertension     Liver disease     fatty liver    Microscopic polyangiitis (HCC)     Ovarian cyst     PTSD (post-traumatic stress disorder)     Self-inflicted injury     self inflicted skin wounds    Wegener's granulomatosis with renal involvement ([facility] )        Past Surgical History:   Procedure Laterality Date    ESOPHAGOGASTRODUODENOSCOPY  2015    mild antral gastritis    GASTRIC STIMULATOR IMPLANT SURGERY  2020    IA COLONOSCOPY FLX DX W/COLLJ SPEC WHEN PFRMD N/A 2018    adenoma removed from the transverse, hyperplastic polyp removed from the left colon    IA ESOPHAGOGASTRODUODENOSCOPY TRANSORAL DIAGNOSTIC N/A 2018    gastritis and scant coffee-ground material   Biopsies negative for H  pylori    RELEASE SCAR CONTRACTURE / GRAFT REPAIRS OF HAND Bilateral     UPPER GASTROINTESTINAL ENDOSCOPY  2019    Dr Zeb Vargas   Botox to the pylorus       Family History   Problem Relation Age of Onset    Arthritis Mother     Depression Mother     Diabetes Mother     Mental illness Mother    [facility]     No Known Problems Father     Colon cancer Neg Hx     Drug abuse Neg Hx         mother father    Mental illness Neg Hx         disorder, mother father    Cancer Neg Hx     Breast cancer Neg Hx        Social History     Occupational History    Occupation: disability   Tobacco Use    Smoking status: Former Smoker     Packs/day: 1 00     Years: 10 00     Pack years: 10      Types: Cigarettes     Quit date: 2011     Years since quittin 4    Smokeless tobacco: Never Used    Tobacco comment: Stopped smoking 11 years ago   Vaping Use    Vaping Use: Never used   Substance and Sexual Activity    Alcohol use: Never    Drug use: Yes     Types: Marijuana     Comment: marijuana daily    Sexual activity: Not Currently     Partners: Male     Birth control/protection: None         Current Outpatient Medications:     acetaminophen (TYLENOL) 500 mg tablet, Take 2 tablets (1,000 mg total) by mouth every 8 (eight) hours as needed for mild pain, Disp: 60 tablet, Rfl: 1    Alcohol Swabs (Alcohol Pads) 70 % PADS, Use 4 (four) times a day, Disp: 400 each, Rfl: 2    amLODIPine (NORVASC) 5 mg tablet, TAKE ONE TABLET BY MOUTH ONCE DAILY, Disp: 90 tablet, Rfl: 1    amphetamine-dextroamphetamine (ADDERALL XR) 20 MG 24 hr capsule, Take 1 capsule (20 mg total) by mouth in the morning and 1 capsule (20 mg total) in the evening  Max Daily Amount: 40 mg , Disp: 60 capsule, Rfl: 0    Blood Glucose Monitoring Suppl (FreeStyle Lite) DEIRDRE, Use daily, Disp: 1 each, Rfl: 0    buPROPion (WELLBUTRIN XL) 300 mg 24 hr tablet, Take 1 tablet (300 mg total) by mouth every morning, Disp: 30 tablet, Rfl: 2    cholecalciferol (VITAMIN D3) 1,000 units tablet, Take 2 tablets (2,000 Units total) by mouth daily for 20 days, Disp: 40 tablet, Rfl: 0    clobetasol (TEMOVATE) 0 05 % cream, APPLY TOPICALLY TO AFFECTED AREA TWICE A DAY, Disp: 60 g, Rfl: 0    Diclofenac Sodium (VOLTAREN) 1 %, Apply 2 g topically 4 (four) times a day, Disp: 100 g, Rfl: 0    dicyclomine (BENTYL) 20 mg tablet, Take 1 tablet (20 mg total) by mouth in the morning and 1 tablet (20 mg total) in the evening  Do all this for 7 days  , Disp: 20 tablet, Rfl: 0    Easy Comfort Lancets MISC, USE TO TEST THE BLOOD SUGAR THREE TIMES A DAY, Disp: 300 each, Rfl: 10    glucose blood (FREESTYLE LITE) test strip, Use as instructed, Disp: 100 strip, Rfl: 0    hydrOXYzine HCL (ATARAX) 25 mg tablet, , Disp: , Rfl:     insulin aspart (NovoLOG FlexPen) 100 UNIT/ML injection pen, Inject 6 Units under the skin 3 (three) times a day with meals, Disp: 15 mL, Rfl: 0    insulin glargine (Toujeo Max SoloStar) 300 units/mL CONCENTRATED U-300 injection pen (2-unit dial), Inject 22 Units under the skin daily, Disp: 9 mL, Rfl: 0    Insulin Pen Needle (Pen Needles) 31G X 8 MM MISC, Inject under the skin 4 (four) times a day (with meals and at bedtime), Disp: 100 each, Rfl: 11    lidocaine (XYLOCAINE) 5 % ointment, APPLY TOPICALLY 2GM TWICE A DAY TO AFFECTED AREAS AS NEEDED FOR MILD PAIN, Disp: 250 g, Rfl: 8    Lidocaine Viscous HCl (XYLOCAINE) 2 % mucosal solution, Swish and spit 15 mL 4 (four) times a day as needed for mouth pain or discomfort, Disp: 15 mL, Rfl: 1    Linzess 72 MCG CAPS, TAKE ONE CAPSULE BY MOUTH ONCE DAILY, Disp: 90 capsule, Rfl: 10    ondansetron (ZOFRAN) 4 mg tablet, Take 1 tablet (4 mg total) by mouth every 8 (eight) hours as needed for nausea or vomiting for up to 7 doses, Disp: 7 tablet, Rfl: 0    ondansetron (ZOFRAN) 8 mg tablet, Take 1 tablet (8 mg total) by mouth every 8 (eight) hours as needed for nausea or vomiting, Disp: 30 tablet, Rfl: 2    oxyCODONE (ROXICODONE) 10 MG TABS, Take 1 tablet (10 mg total) by mouth every 4 (four) hours as needed for moderate pain Max Daily Amount: 60 mg, Disp: 180 tablet, Rfl: 0    pantoprazole (PROTONIX) 40 mg tablet, Take 1 tablet (40 mg total) by mouth 2 (two) times a day before meals, Disp: 180 tablet, Rfl: 1    pregabalin (LYRICA) 100 mg capsule, Take 1 capsule (100 mg total) by mouth 3 (three) times a day, Disp: 90 capsule, Rfl: 1    Probiotic Product (PROBIOTIC-10 PO), Take 1 tablet by mouth daily, Disp: , Rfl:     promethazine (PHENERGAN) 25 mg tablet, Take 1 tablet (25 mg total) by mouth every 6 (six) hours as needed for nausea or vomiting, Disp: 60 tablet, Rfl: 3    Restasis 0 05 % ophthalmic emulsion, , Disp: , Rfl:     scopolamine (TRANSDERM-SCOP) 1 5 mg/3 days TD 72 hr patch, Place 1 patch on the skin every third day, Disp: 10 patch, Rfl: 0    sucralfate (CARAFATE) 1 g/10 mL suspension, TAKE 10ML BY MOUTH THREE TIMES A DAY, Disp: 500 mL, Rfl: 2    sucralfate (CARAFATE) 1 g/10 mL suspension, Take 10 mL (1 g total) by mouth in the morning and 10 mL (1 g total) at noon and 10 mL (1 g total) in the evening and 10 mL (1 g total) before bedtime  Do all this for 7 days  , Disp: 420 mL, Rfl: 0    docusate sodium (COLACE) 100 mg capsule, , Disp: , Rfl:     Allergies   Allergen Reactions    Prozac [Fluoxetine Hcl]      SI    Bactrim [Sulfamethoxazole-Trimethoprim]      Pt "They think that is what cause the pancreatitis"     Flagyl [Metronidazole] Diarrhea and Abdominal Pain    Lamictal [Lamotrigine] GI Intolerance    Lithium Other (See Comments)    Haldol [Haloperidol] Other (See Comments)     "I don't like it"    Ibuprofen     Lexapro [Escitalopram Oxalate] Rash    Navane [Thiothixene]      SI    Other      "novaine?" antipsychotic       Physical Exam:    Ht 5' 2" (1 575 m)   Wt 65 8 kg (145 lb)   LMP  (LMP Unknown)   BMI 26 52 kg/m²     Constitutional:normal, well developed, well nourished, alert, in no distress and non-toxic and no overt pain behavior  Eyes:anicteric  HEENT:grossly intact  Neck:supple, symmetric, trachea midline and no masses   Pulmonary:even and unlabored  Cardiovascular:No edema or pitting edema present  Skin:Normal without rashes or lesions and well hydrated  Psychiatric:Mood and affect appropriate  Neurologic:Cranial Nerves II-XII grossly intact  Musculoskeletal:in wheelchair and Range of motion of the lumbar spine is significantly limited in all planes  there are bilateral lumbar paraspinal muscle spasms present    Range of motion of the right hip is slightly limited in all planes    Imaging  US guidance    (Results Pending)   XR hip/pelv 2-3 vws left if performed    (Results Pending)       Orders Placed This Encounter   Procedures    XR hip/pelv 2-3 vws right if performed    US guidance    XR hip/pelv 2-3 vws left if performed

## 2022-06-03 ENCOUNTER — TELEPHONE (OUTPATIENT)
Dept: NEPHROLOGY | Facility: CLINIC | Age: 52
End: 2022-06-03

## 2022-06-07 ENCOUNTER — EVALUATION (OUTPATIENT)
Dept: PHYSICAL THERAPY | Facility: MEDICAL CENTER | Age: 52
End: 2022-06-07
Payer: MEDICARE

## 2022-06-07 DIAGNOSIS — M79.644 FINGER PAIN, RIGHT: Primary | ICD-10-CM

## 2022-06-07 PROCEDURE — 97162 PT EVAL MOD COMPLEX 30 MIN: CPT | Performed by: PHYSICAL THERAPIST

## 2022-06-07 NOTE — PROGRESS NOTES
PT Evaluation     Today's date: 2022  Patient name: Ry Jaimes  : 1970  MRN: [id number]  Referring provider: Corine Rodriguez*  Dx:   Encounter Diagnosis     ICD-10-CM    1  Finger pain, right  [id number] Ambulatory referral to Physical Therapy                  Assessment  Assessment details: Ms Leroy High is a 46 y o  female who presents today for physical therapy evaluation for chronic right MF pain and dorsal lateral numbness  Patient presents today with her home health aide  Patient has no CARIE and symptoms are unpredictable and unable to be to reproduced consistently  Mobility and stability of the hand is WNL She does present with a 50% strength deficit of the right hand  Cervical spine screening is inconclusive at this time  She did report reproduction of symptoms with Spurling's and PIVM testing and resolution of symptoms with distraction but this was inconsistent during testing  She follows up with pain management later this month regarding her cervical and lumbar spine  I provided her with a home program to work on her hand strength that was easy to follow and pain free  I did refer her back to her physician regarding her neck and will follow up as necessary following  Please contact me with any questions  Thank you for the referral    Impairments: impaired physical strength and lacks appropriate home exercise program  Barriers to therapy: Chronic medical history, relies on transportation  Understanding of Dx/Px/POC: good  Goals  1  Patient will be independent with HEP-met    Plan  Planned therapy interventions: home exercise program  Treatment plan discussed with: patient (home health aide)        Subjective Evaluation    History of Present Illness  Mechanism of injury: Ms Leroy High is a 46 y o  female who presents today with reports of right MF pain and numbness  Patient reports onset of symptoms roughly 6 months ago  She denies any injury  She reports insidious onset   Patient has a significant sleep disorder and requires use of a chair so she does not fall  Patient reports difficulty with lifting  She states the pain is sharp and unpredictable  She states it is brief but severe  Patient is currently taking Percocet for the past 5 years for other chronic pain conditions  Patient reports concerns about losing function of her hand  She is most limited in lifting anything more than a half gallon of milk  She is able to perform all ADLs  Patient does report a history of chronic neck pain with radiating arm pain  She had an injection roughly two months ago that did provide her with some temporary relief of her hand pain  She states she follows up with the physician later this month  Pain  Current pain ratin  At best pain ratin  Pain scale at highest: 15/10  Pain location: PIP joint of middle finger  Quality: sharp (numb)  Exacerbated by: unpredictable  Progression: worsening    Social Support  Lives with: alone    Employment status: not working  Hand dominance: right      Diagnostic Tests  X-ray: normal  Abnormal MRI: unable to have an MRI  Treatments  No previous or current treatments  Patient Goals  Patient goals for therapy: decreased pain and increased strength  Patient's goals regarding treatment: be able to lift  Objective     Observations     Right Wrist/Hand   Negative for deformity       Additional Observation Details  Chronic mallet finger of  L IF    Tenderness     Additional Tenderness Details  No tenderness to palpation    Neurological Testing     Sensation     Wrist/Hand     Comments   Right light touch: diminished dorsal aspect of all digits, volar aspect symmetrical to contralateral side    Active Range of Motion     Right Wrist   Normal active range of motion    Additional Active Range of Motion Details  Full composite fist, pain in R IF and MF      Passive Range of Motion     Additional Passive Range of Motion Details  Pain with passive intrinsic stretch at IF and RF of right hand, no restrictions or tightness  No pain with varus or valgus stresses at MP or IP joints  Passive mobility WNL all joints      Strength/Myotome Testing     Left Wrist/Hand   Normal wrist strength     (2nd hand position)     Trial 1: 80    Trial 2: 85    Trial 3: 85    Right Wrist/Hand   Normal wrist strength     (2nd hand position)     Trial 1: 40    Trial 2: 45    Trial 3: 40    Additional Strength Details  decreased strength MCP extension of RF and SF of right hand        Tests     Right Wrist/Hand   Negative extrinsic extensor tightness, intrinsic muscle tightness and oblique retinacular ligament tightness       Additional Tests Details  Cervical spine special testing: inconclusive Spurling's B and distraction  AROM cervical spine and shoulder/elbow WNL and symmetrical bilaterally  No change with repeated movement testing   myotomal strength WNL and symmetrical (pain with resisted shoulder ABD)  Pain and reproduction of symptoms in hand with passive joint movement testing R C6, mild mobility restrictions             Precautions: sleep disorder (cataplexy/narcolepsy), fall risk, gastric pacemaker stimulator, bipolar disorder, personality disorder, CKD, DM, PTSD    HEP: TGE's, putty (pinch, , finger adduction)

## 2022-06-09 ENCOUNTER — TELEPHONE (OUTPATIENT)
Dept: RADIOLOGY | Facility: CLINIC | Age: 52
End: 2022-06-09

## 2022-06-09 NOTE — TELEPHONE ENCOUNTER
----- Message from Bernardo Flanagan, [address] sent at 6/9/2022  7:45 AM EDT -----  X-ray of both hips reveal no acute findings  No significant arthritis  Please advise patient to keep scheduled appointments

## 2022-06-12 ENCOUNTER — APPOINTMENT (EMERGENCY)
Dept: RADIOLOGY | Facility: HOSPITAL | Age: 52
End: 2022-06-12
Payer: MEDICARE

## 2022-06-12 ENCOUNTER — HOSPITAL ENCOUNTER (EMERGENCY)
Facility: HOSPITAL | Age: 52
Discharge: HOME | End: 2022-06-12
Attending: EMERGENCY MEDICINE
Payer: MEDICARE

## 2022-06-12 VITALS
TEMPERATURE: 98.5 F | RESPIRATION RATE: 16 BRPM | HEART RATE: 70 BPM | DIASTOLIC BLOOD PRESSURE: 68 MMHG | SYSTOLIC BLOOD PRESSURE: 162 MMHG | OXYGEN SATURATION: 96 %

## 2022-06-12 DIAGNOSIS — F31.9 BIPOLAR 1 DISORDER (HCC): ICD-10-CM

## 2022-06-12 DIAGNOSIS — S62.102A LEFT WRIST FRACTURE, CLOSED, INITIAL ENCOUNTER: Primary | ICD-10-CM

## 2022-06-12 DIAGNOSIS — G89.4 CHRONIC PAIN SYNDROME: ICD-10-CM

## 2022-06-12 DIAGNOSIS — M31.31 GRANULOMATOSIS WITH POLYANGIITIS WITH RENAL INVOLVEMENT (HCC): Chronic | ICD-10-CM

## 2022-06-12 PROCEDURE — 99283 EMERGENCY DEPT VISIT LOW MDM: CPT

## 2022-06-12 PROCEDURE — 25605 CLTX DST RDL FX/EPHYS SEP W/: CPT | Performed by: PHYSICIAN ASSISTANT

## 2022-06-12 PROCEDURE — 99284 EMERGENCY DEPT VISIT MOD MDM: CPT | Performed by: PHYSICIAN ASSISTANT

## 2022-06-12 PROCEDURE — 73110 X-RAY EXAM OF WRIST: CPT

## 2022-06-12 RX ORDER — OXYCODONE HYDROCHLORIDE AND ACETAMINOPHEN 5; 325 MG/1; MG/1
1 TABLET ORAL ONCE
Status: COMPLETED | OUTPATIENT
Start: 2022-06-12 | End: 2022-06-12

## 2022-06-12 RX ORDER — LIDOCAINE HYDROCHLORIDE 10 MG/ML
10 INJECTION, SOLUTION EPIDURAL; INFILTRATION; INTRACAUDAL; PERINEURAL ONCE
Status: COMPLETED | OUTPATIENT
Start: 2022-06-12 | End: 2022-06-12

## 2022-06-12 RX ADMIN — OXYCODONE AND ACETAMINOPHEN 1 TABLET: 5; 325 TABLET ORAL at 18:14

## 2022-06-12 RX ADMIN — LIDOCAINE HYDROCHLORIDE 10 ML: 10 INJECTION, SOLUTION EPIDURAL; INFILTRATION; INTRACAUDAL at 19:41

## 2022-06-12 RX ADMIN — OXYCODONE AND ACETAMINOPHEN 1 TABLET: 5; 325 TABLET ORAL at 21:20

## 2022-06-12 NOTE — ED PROVIDER NOTES
History  Chief Complaint   Patient presents with    Arm Injury     Pt was angry and hit the wall  Pt c/o left arm pain      Patient is a 45 y/o female with a PMH of ADHD, anemia, anxiety/depression, bipolar disorder, GERD, HTN, CKD, DM, chronic pain who complains of left wrist pain  She states earlier today she slapped the door hard and her wrist bent back  She complains of pain, swelling, deformity to the left wrist  Pain radiates up to the elbow  She denies hx of fracture or surgery to the left upper extremity  She has limited ROM due to pain  She took a percocet at home 3 hrs ago without significant relief  She denies numbness or weakness  She denies other complaints at this time  Patient is right handed  Patient states she has seen orthopedics in the past about 2 months ago for a right finger injury  Prior to Admission Medications   Prescriptions Last Dose Informant Patient Reported? Taking?    Alcohol Swabs (Alcohol Pads) 70 % PADS   No No   Sig: Use 4 (four) times a day   Blood Glucose Monitoring Suppl (FreeStyle Lite) DEIRDRE   No No   Sig: Use daily   Diclofenac Sodium (VOLTAREN) 1 %   No No   Sig: Apply 2 g topically 4 (four) times a day   Easy Comfort Lancets MISC   No No   Sig: USE TO TEST THE BLOOD SUGAR THREE TIMES A DAY   Insulin Pen Needle (Pen Needles) 31G X 8 MM MISC   No No   Sig: Inject under the skin 4 (four) times a day (with meals and at bedtime)   Lidocaine Viscous HCl (XYLOCAINE) 2 % mucosal solution   No No   Sig: Swish and spit 15 mL 4 (four) times a day as needed for mouth pain or discomfort   Linzess 72 MCG CAPS   No No   Sig: TAKE ONE CAPSULE BY MOUTH ONCE DAILY   Probiotic Product (PROBIOTIC-10 PO)  Self Yes No   Sig: Take 1 tablet by mouth daily   Restasis 0 05 % ophthalmic emulsion   Yes No   acetaminophen (TYLENOL) 500 mg tablet   No No   Sig: Take 2 tablets (1,000 mg total) by mouth every 8 (eight) hours as needed for mild pain   amLODIPine (NORVASC) 5 mg tablet   No No Sig: TAKE ONE TABLET BY MOUTH ONCE DAILY   amphetamine-dextroamphetamine (ADDERALL XR) 20 MG 24 hr capsule   No No   Sig: Take 1 capsule (20 mg total) by mouth in the morning and 1 capsule (20 mg total) in the evening  Max Daily Amount: 40 mg  buPROPion (WELLBUTRIN XL) 300 mg 24 hr tablet   No No   Sig: Take 1 tablet (300 mg total) by mouth every morning   cholecalciferol (VITAMIN D3) 1,000 units tablet   No No   Sig: Take 2 tablets (2,000 Units total) by mouth daily for 20 days   clobetasol (TEMOVATE) 0 05 % cream   No No   Sig: APPLY TOPICALLY TO AFFECTED AREA TWICE A DAY   dicyclomine (BENTYL) 20 mg tablet   No No   Sig: Take 1 tablet (20 mg total) by mouth in the morning and 1 tablet (20 mg total) in the evening  Do all this for 7 days     docusate sodium (COLACE) 100 mg capsule   Yes No   glucose blood (FREESTYLE LITE) test strip   No No   Sig: Use as instructed   hydrOXYzine HCL (ATARAX) 25 mg tablet   Yes No   insulin aspart (NovoLOG FlexPen) 100 UNIT/ML injection pen   No No   Sig: Inject 6 Units under the skin 3 (three) times a day with meals   insulin glargine (Toujeo Max SoloStar) 300 units/mL CONCENTRATED U-300 injection pen (2-unit dial)   No No   Sig: Inject 22 Units under the skin daily   lidocaine (XYLOCAINE) 5 % ointment   No No   Sig: APPLY TOPICALLY 2GM TWICE A DAY TO AFFECTED AREAS AS NEEDED FOR MILD PAIN   ondansetron (ZOFRAN) 4 mg tablet   No No   Sig: Take 1 tablet (4 mg total) by mouth every 8 (eight) hours as needed for nausea or vomiting for up to 7 doses   ondansetron (ZOFRAN) 8 mg tablet   No No   Sig: Take 1 tablet (8 mg total) by mouth every 8 (eight) hours as needed for nausea or vomiting   oxyCODONE (ROXICODONE) 10 MG TABS   No No   Sig: Take 1 tablet (10 mg total) by mouth every 4 (four) hours as needed for moderate pain Max Daily Amount: 60 mg   pantoprazole (PROTONIX) 40 mg tablet   No No   Sig: Take 1 tablet (40 mg total) by mouth 2 (two) times a day before meals   pregabalin (LYRICA) 100 mg capsule   No No   Sig: Take 1 capsule (100 mg total) by mouth 3 (three) times a day   promethazine (PHENERGAN) 25 mg tablet   No No   Sig: Take 1 tablet (25 mg total) by mouth every 6 (six) hours as needed for nausea or vomiting   scopolamine (TRANSDERM-SCOP) 1 5 mg/3 days TD 72 hr patch   No No   Sig: Place 1 patch on the skin every third day   sucralfate (CARAFATE) 1 g/10 mL suspension   No No   Sig: TAKE 10ML BY MOUTH THREE TIMES A DAY   sucralfate (CARAFATE) 1 g/10 mL suspension   No No   Sig: Take 10 mL (1 g total) by mouth in the morning and 10 mL (1 g total) at noon and 10 mL (1 g total) in the evening and 10 mL (1 g total) before bedtime  Do all this for 7 days  Facility-Administered Medications: None       Past Medical History:   Diagnosis Date    ADHD     Anemia of chronic disease     Anxiety     Arthritis ?  Asthma     Bipolar disorder ([facility] )     [facility]     Cataplexy     Chronic abdominal pain     Chronic kidney disease ?  CKD (chronic kidney disease) stage 3, GFR 30-59 ml/min (HCC)     Cushing syndrome (HCC)     Depression ?     Diabetes mellitus [facility]     DVT (deep venous thrombosis) (HCC)     GERD (gastroesophageal reflux disease)     Headache(784 0) 3 months    History of acute pancreatitis     felt secondary to Bactrim    History of transfusion     Hypertension     Liver disease     fatty liver    Microscopic polyangiitis (HCC)     Ovarian cyst     PTSD (post-traumatic stress disorder)     Self-inflicted injury     self inflicted skin wounds    Wegener's granulomatosis with renal involvement ([facility] ) 2015       Past Surgical History:   Procedure Laterality Date    ESOPHAGOGASTRODUODENOSCOPY  09/11/2015    mild antral gastritis    GASTRIC STIMULATOR IMPLANT SURGERY  06/25/2020    NE COLONOSCOPY FLX DX W/COLLJ SPEC WHEN PFRMD N/A 12/14/2018    adenoma removed from the transverse, hyperplastic polyp removed from the left colon    WA ESOPHAGOGASTRODUODENOSCOPY TRANSORAL DIAGNOSTIC N/A 2018    gastritis and scant coffee-ground material   Biopsies negative for H  pylori    RELEASE SCAR CONTRACTURE / GRAFT REPAIRS OF HAND Bilateral     UPPER GASTROINTESTINAL ENDOSCOPY  2019    Dr Shaniqua Arguetaox to the pylorus       Family History   Problem Relation Age of Onset    Arthritis Mother     Depression Mother     Diabetes Mother     Mental illness Mother    Marcelinaelyn Salmon Migraines Mother     No Known Problems Father     Colon cancer Neg Hx     Drug abuse Neg Hx         mother father    Mental illness Neg Hx         disorder, mother father    Cancer Neg Hx     Breast cancer Neg Hx      I have reviewed and agree with the history as documented  E-Cigarette/Vaping    E-Cigarette Use Never User      E-Cigarette/Vaping Substances    Nicotine No     THC No     CBD No     Flavoring No     Other No     Unknown No      Social History     Tobacco Use    Smoking status: Former Smoker     Packs/day:      Years: 10 00     Pack years: 10 00     Types: Cigarettes     Quit date: 2011     Years since quittin 4    Smokeless tobacco: Never Used    Tobacco comment: Stopped smoking 11 years ago   Vaping Use    Vaping Use: Never used   Substance Use Topics    Alcohol use: Never    Drug use: Yes     Types: Marijuana     Comment: marijuana daily       Review of Systems   Constitutional: Negative for chills and fever  Respiratory: Negative for shortness of breath  Cardiovascular: Negative for chest pain  Gastrointestinal: Negative for abdominal pain, diarrhea, nausea and vomiting  Musculoskeletal: Positive for arthralgias and joint swelling  Skin: Negative for color change and wound  Neurological: Negative for weakness and numbness  All other systems reviewed and are negative  Physical Exam  Physical Exam  Vitals and nursing note reviewed  Constitutional:       General: She is not in acute distress  Appearance: Normal appearance  She is normal weight  She is not ill-appearing, toxic-appearing or diaphoretic  HENT:      Head: Normocephalic and atraumatic  Right Ear: External ear normal       Left Ear: External ear normal    Cardiovascular:      Rate and Rhythm: Normal rate and regular rhythm  Heart sounds: Normal heart sounds  No murmur heard  Pulmonary:      Effort: Pulmonary effort is normal  No respiratory distress  Breath sounds: Normal breath sounds  No stridor  No wheezing or rhonchi  Abdominal:      General: Abdomen is flat  There is no distension  Musculoskeletal:      Right wrist: Normal       Left wrist: Swelling, deformity, tenderness and bony tenderness present  No crepitus  Decreased range of motion  Normal pulse  Cervical back: Normal range of motion  Comments: Left wrist with tenderness palpation, swelling, obvious deformity to the distal radius  Limited range of motion of the wrist and hand due to pain  Radial pulse intact  Capillary refill intact distally  Neurovascularly intact distally  No proximal forearm or elbow pain or deformity  Skin:     General: Skin is warm and dry  Capillary Refill: Capillary refill takes less than 2 seconds  Neurological:      Mental Status: She is alert     Psychiatric:         Mood and Affect: Mood normal          Vital Signs  ED Triage Vitals   Temperature Pulse Respirations Blood Pressure SpO2   06/12/22 1702 06/12/22 1702 06/12/22 1702 06/12/22 1702 06/12/22 1702   98 5 °F (36 9 °C) 70 16 162/68 96 %      Temp src Heart Rate Source Patient Position - Orthostatic VS BP Location FiO2 (%)   -- 06/12/22 1702 06/12/22 1702 06/12/22 1702 --    Monitor Sitting Right arm       Pain Score       06/12/22 1814       8           Vitals:    06/12/22 1702   BP: 162/68   Pulse: 70   Patient Position - Orthostatic VS: Sitting         Visual Acuity      ED Medications  Medications   oxyCODONE-acetaminophen (PERCOCET) 5-325 mg per tablet 1 tablet (1 tablet Oral Given 6/12/22 1814)   lidocaine (PF) (XYLOCAINE-MPF) 1 % injection 10 mL (10 mL Infiltration Given 6/12/22 1941)   oxyCODONE-acetaminophen (PERCOCET) 5-325 mg per tablet 1 tablet (1 tablet Oral Given 6/12/22 2120)       Diagnostic Studies  Results Reviewed     None                 XR wrist 3+ views LEFT   Final Result by Ryanne Villanueva MD (06/12 2130)      Comminuted and displaced fracture of the distal radius  Workstation performed: [id number]         XR wrist 3+ views LEFT    (Results Pending)              Procedures  Orthopedic injury treatment    Date/Time: 6/12/2022 8:00 PM  Performed by: Inés Rogers PA-C  Authorized by: Inés Rogers PA-C     Patient Location:  ED  [place] Protocol:  Procedure performed by: (Dr Carol Pulido)  Consent: Verbal consent obtained  Risks and benefits: risks, benefits and alternatives were discussed  Consent given by: patient  Time out: Immediately prior to procedure a "time out" was called to verify the correct patient, procedure, equipment, support staff and site/side marked as required  Patient understanding: patient states understanding of the procedure being performed  Site marked: the operative site was marked  Radiology Images displayed and confirmed   If images not available, report reviewed: imaging studies available  Patient identity confirmed: verbally with patient      Injury location:  Wrist  Location details:  Left wrist  Injury type:  Fracture  Fracture type: distal radius    Fracture type: distal radius    Neurovascular status: Neurovascularly intact    Distal perfusion: normal    Neurological function: normal    Range of motion: reduced    Local anesthesia used?: Yes    Anesthesia:  Hematoma block  Local anesthetic:  Lidocaine 1% without epinephrine  Manipulation performed?: Yes    Reduction successful: partial     Confirmation: Reduction confirmed by x-ray    Immobilization:  Splint and sling  Splint type:  Sugar tong  Supplies used:  Cotton padding, elastic bandage and Ortho-Glass  Neurovascular status: Neurovascularly intact    Distal perfusion: normal    Neurological function: normal    Patient tolerance:  Patient tolerated the procedure well with no immediate complications             ED Course                               SBIRT 22yo+    Flowsheet Row Most Recent Value   SBIRT (23 yo +)    In order to provide better care to our patients, we are screening all of our patients for alcohol and drug use  Would it be okay to ask you these screening questions? No Filed at: 06/12/2022 1751                    MDM  Number of Diagnoses or Management Options  Left wrist fracture, closed, initial encounter  Diagnosis management comments: Will xray the left wrist and give percocet here for pain  X-ray left wrist reviewed by me and interpreted as comminuted displaced distal radius fracture  Discussed results with patient  Explained we will need to attempted reduction of the fracture with hematoma block and then apply splint  Patient states understanding  Reduction attempted by myself, NANCY, and Dr Robb Dean and sugar tong splint applied  Post reduction films performed with some improvement in alignment however still with some displacement  Discussed with orthopedics on call- Dr Isaiah Shrestha- patient can follow up in the office and they will get her in early in the week  Discussed plan with patient  Given contact information for her to call the office tomorrow to set up follow up - inform the office she was seen In the ED for wrist fracture  Patient has a prescription for oxycodone at home already- 30 day supply was filled on 5/16/22 as seen on [facility] aware drug monitoring system  Patient states understanding and agrees with plan          Amount and/or Complexity of Data Reviewed  Tests in the radiology section of CPT®: ordered and reviewed  Independent visualization of images, tracings, or specimens: yes        Disposition  Final diagnoses:   Left wrist fracture, closed, initial encounter     Time reflects when diagnosis was documented in both MDM as applicable and the Disposition within this note     Time User Action Codes Description Comment    6/12/2022  9:24 PM Kelli Elizondo Add [[id number]] Left wrist fracture, closed, initial encounter       ED Disposition     ED Disposition   Discharge    Condition   Stable    Date/Time   Sun Jun 12, 2022  9:25 PM    Comment   Yolande Goltz discharge to home/self care                 Follow-up Information     Follow up With Specialties Details Why Contact Info Additional Information    Stacey Ken PA-C Family Medicine Schedule an appointment as soon as possible for a visit in 1 day  [facility]  [phone]       [address] Orthopedic Surgery Schedule an appointment as soon as possible for a visit in 1 day Call the office tomorrow morning for follow up for your wrist fracture [address] [facility] [place] [facility]  [place] [address] [address]  [address] [place] [address], [address] [facility] [place] [facility], [address] [place], [place], [address]   801 [facility] Emergency Medicine  If symptoms worsen 206 [facility] [address]  112 [facility], [address] [place] [address] , [place], [place], [address]          Discharge Medication List as of 6/12/2022  9:25 PM      CONTINUE these medications which have NOT CHANGED    Details   acetaminophen (TYLENOL) 500 mg tablet Take 2 tablets (1,000 mg total) by mouth every 8 (eight) hours as needed for mild pain, Starting Mon 2/28/2022, Normal      Alcohol Swabs (Alcohol Pads) 70 % PADS Use 4 (four) times a day, Starting Wed 12/1/2021, Normal      amLODIPine (NORVASC) 5 mg tablet TAKE ONE TABLET BY MOUTH ONCE DAILY, Normal amphetamine-dextroamphetamine (ADDERALL XR) 20 MG 24 hr capsule Take 1 capsule (20 mg total) by mouth in the morning and 1 capsule (20 mg total) in the evening  Max Daily Amount: 40 mg , Starting Mon 5/16/2022, Normal      Blood Glucose Monitoring Suppl (FreeStyle Lite) DEIRDRE Use daily, Starting Fri 7/23/2021, Normal      buPROPion (WELLBUTRIN XL) 300 mg 24 hr tablet Take 1 tablet (300 mg total) by mouth every morning, Starting Tue 1/18/2022, Normal      cholecalciferol (VITAMIN D3) 1,000 units tablet Take 2 tablets (2,000 Units total) by mouth daily for 20 days, Starting Wed 2/16/2022, Until Thu 6/2/2022, Normal      clobetasol (TEMOVATE) 0 05 % cream APPLY TOPICALLY TO AFFECTED AREA TWICE A DAY, Normal      Diclofenac Sodium (VOLTAREN) 1 % Apply 2 g topically 4 (four) times a day, Starting Wed 4/6/2022, Normal      dicyclomine (BENTYL) 20 mg tablet Take 1 tablet (20 mg total) by mouth in the morning and 1 tablet (20 mg total) in the evening  Do all this for 7 days  , Starting Thu 5/12/2022, Until Thu 6/2/2022, Normal      docusate sodium (COLACE) 100 mg capsule Starting Wed 3/23/2022, Historical Med      Easy Comfort Lancets MISC USE TO TEST THE BLOOD SUGAR THREE TIMES A DAY, Normal      glucose blood (FREESTYLE LITE) test strip Use as instructed, Normal      hydrOXYzine HCL (ATARAX) 25 mg tablet Starting Fri 1/14/2022, Historical Med      insulin aspart (NovoLOG FlexPen) 100 UNIT/ML injection pen Inject 6 Units under the skin 3 (three) times a day with meals, Starting Mon 3/7/2022, Normal      insulin glargine (Toujeo Max SoloStar) 300 units/mL CONCENTRATED U-300 injection pen (2-unit dial) Inject 22 Units under the skin daily, Starting Mon 3/7/2022, Normal      Insulin Pen Needle (Pen Needles) 31G X 8 MM MISC Inject under the skin 4 (four) times a day (with meals and at bedtime), Starting Tue 3/1/2022, Normal      lidocaine (XYLOCAINE) 5 % ointment APPLY TOPICALLY 2GM TWICE A DAY TO AFFECTED AREAS AS NEEDED FOR MILD PAIN, Normal      Lidocaine Viscous HCl (XYLOCAINE) 2 % mucosal solution Swish and spit 15 mL 4 (four) times a day as needed for mouth pain or discomfort, Starting Fri 7/16/2021, Normal      Linzess 72 MCG CAPS TAKE ONE CAPSULE BY MOUTH ONCE DAILY, Normal      !! ondansetron (ZOFRAN) 4 mg tablet Take 1 tablet (4 mg total) by mouth every 8 (eight) hours as needed for nausea or vomiting for up to 7 doses, Starting Thu 5/12/2022, Normal      !! ondansetron (ZOFRAN) 8 mg tablet Take 1 tablet (8 mg total) by mouth every 8 (eight) hours as needed for nausea or vomiting, Starting Tue 3/29/2022, Normal      oxyCODONE (ROXICODONE) 10 MG TABS Take 1 tablet (10 mg total) by mouth every 4 (four) hours as needed for moderate pain Max Daily Amount: 60 mg, Starting Mon 5/16/2022, Normal      pantoprazole (PROTONIX) 40 mg tablet Take 1 tablet (40 mg total) by mouth 2 (two) times a day before meals, Starting Tue 1/18/2022, Normal      pregabalin (LYRICA) 100 mg capsule Take 1 capsule (100 mg total) by mouth 3 (three) times a day, Starting Tue 5/3/2022, Normal      Probiotic Product (PROBIOTIC-10 PO) Take 1 tablet by mouth daily, Historical Med      promethazine (PHENERGAN) 25 mg tablet Take 1 tablet (25 mg total) by mouth every 6 (six) hours as needed for nausea or vomiting, Starting Mon 5/10/2021, Normal      Restasis 0 05 % ophthalmic emulsion Starting Wed 12/29/2021, Historical Med      scopolamine (TRANSDERM-SCOP) 1 5 mg/3 days TD 72 hr patch Place 1 patch on the skin every third day, Starting Tue 4/27/2021, Normal      sucralfate (CARAFATE) 1 g/10 mL suspension TAKE 10ML BY MOUTH THREE TIMES A DAY, Normal       !! - Potential duplicate medications found  Please discuss with provider  No discharge procedures on file      PDMP Review       Value Time User    PDMP Reviewed  Yes 5/16/2022  2:02 PM Ej Khan PA-C          ED Provider  Electronically Signed by           Jessica Campos PA-C  06/12/22 6070

## 2022-06-13 ENCOUNTER — TELEPHONE (OUTPATIENT)
Dept: OBGYN CLINIC | Facility: HOSPITAL | Age: 52
End: 2022-06-13

## 2022-06-13 ENCOUNTER — OFFICE VISIT (OUTPATIENT)
Dept: FAMILY MEDICINE CLINIC | Facility: CLINIC | Age: 52
End: 2022-06-13

## 2022-06-13 VITALS
SYSTOLIC BLOOD PRESSURE: 98 MMHG | TEMPERATURE: 97 F | RESPIRATION RATE: 19 BRPM | OXYGEN SATURATION: 97 % | DIASTOLIC BLOOD PRESSURE: 60 MMHG | HEART RATE: 56 BPM

## 2022-06-13 DIAGNOSIS — M25.531 RIGHT WRIST PAIN: ICD-10-CM

## 2022-06-13 DIAGNOSIS — S62.102A CLOSED FRACTURE OF LEFT WRIST, INITIAL ENCOUNTER: Primary | ICD-10-CM

## 2022-06-13 PROCEDURE — 3074F SYST BP LT 130 MM HG: CPT | Performed by: PHYSICIAN ASSISTANT

## 2022-06-13 PROCEDURE — 99213 OFFICE O/P EST LOW 20 MIN: CPT | Performed by: PHYSICIAN ASSISTANT

## 2022-06-13 PROCEDURE — 3078F DIAST BP <80 MM HG: CPT | Performed by: PHYSICIAN ASSISTANT

## 2022-06-13 RX ORDER — DEXTROAMPHETAMINE SACCHARATE, AMPHETAMINE ASPARTATE MONOHYDRATE, DEXTROAMPHETAMINE SULFATE AND AMPHETAMINE SULFATE 5; 5; 5; 5 MG/1; MG/1; MG/1; MG/1
20 CAPSULE, EXTENDED RELEASE ORAL 2 TIMES DAILY
Qty: 60 CAPSULE | Refills: 0 | Status: SHIPPED | OUTPATIENT
Start: 2022-06-13 | End: 2022-07-08 | Stop reason: SDUPTHER

## 2022-06-13 RX ORDER — OXYCODONE HYDROCHLORIDE 10 MG/1
10 TABLET ORAL EVERY 4 HOURS PRN
Qty: 180 TABLET | Refills: 0 | Status: SHIPPED | OUTPATIENT
Start: 2022-06-13 | End: 2022-06-27 | Stop reason: DRUGHIGH

## 2022-06-13 NOTE — PATIENT INSTRUCTIONS
[facility]  [address] [facility], [address] [place], [place], [address] [phone]

## 2022-06-13 NOTE — LETTER
June 13, 2022     Patient: Bhupinder Nicholson  YOB: 1970  Date of Visit: 6/13/2022      To Whom it May Concern:    Bhupinder Nicholson is under my professional care  Lila Dickson was seen in my office on 6/13/2022  Patient has known past medical history of gastroparesis, GERD, constipation, ADHD, type 2 diabetes mellitus, cataplexy, Wegener's granulomatosis, small fiber neuropathy, bipolar 1 disorder  Patient is wheel chair bound  Patient suffered left wrist fracture on 6/12/22 which required patient to report to the emergency department for further evaluation  Patient is currently wearing a splint and sling and is following up with orthopedics  Due to this medical acute condition, patient requires to have 24 hours a day/7 days a week home health services for the next 2 weeks  Patient will be re-evaluated at that time  Patient cannot perform any activities of daily living on her own including dressing, bathing, cooking  Patient requires assistance to transfer in and out of her bed and chairs  If you have any questions or concerns, please don't hesitate to call           Sincerely,          Katarzyna Thrasher PA-C        CC: No Recipients

## 2022-06-13 NOTE — PROGRESS NOTES
Assessment/Plan:    Closed fracture of left wrist  - Reviewed left wrist x-ray from 06/12/2022 which reveals comminuted and displaced fracture of the distal radius  Reduction was attempted, but not fully corrected in the ED   - Reviewed follow-up left wrist x-ray from 06/12/2022 which reveals improved alignment and angulation of distal radius fracture  - Continue with splint and sling     - STAT referral made to Orthopedics  Patient is scheduled for appointment tomorrow with Dr Beti Bueno @ 10 AM in [place]  - Due to acute injury, patient requires increased hours with her home health aide  Letter created and faxed successfully to [address] [place] [address] at [phone]  Confirmation receipt received  Diagnoses and all orders for this visit:    Closed fracture of left wrist, initial encounter  -     Ambulatory Referral to Orthopedic Surgery; Future    Right wrist pain  -     Ambulatory Referral to Orthopedic Surgery; Future          All of patients questions were answered  Patient understands and agrees with the above plan  Return for Next scheduled follow up as scheduled on 6/27/22  Belkys Ding PA-C  06/13/22  [facility] [address] [facility]          Subjective:     Patient ID: Alfa Almeida  is a 46 y o  female with known PHM of gastroparesis, GERD, constipation, ADHD, type 2 diabetes mellitus, cataplexy, Wegener's granulomatosis, small fiber neuropathy, bipolar 1 disorder  who presents today in office for ED visit follow up  - Patient is a 46 y o  female who presents today for ED visit follow up  Patient presented to [facility] ED on 06/12/2022 due to left wrist pain  Earlier, patient was angry and slapped the door hard and her wrist bent backwards  Left wrist x-ray revealed comminuted and displaced fracture of the distal radius  Reduction was attempted but not fully corrected in the ED  Sugar-tong splint and sling were applied    Follow-up left wrist x-ray showed improvement in alignment, but still some displacement  Patient was advised to follow-up with orthopedics  Today, patient notes she has been wearing the splint, but it is difficult to put the sling around her neck due to her chronic neck pain  Patient notes she has been taking her regularly prescribed oxycodone which has been keeping the pain under somewhat control  Unfortunately, patient notes her home health aide called out sick today so she is on her own for today  Patient notes due to this injury, she is unable to perform activities of daily living such as dressing herself  Of note, patient notes she also slapped her right wrist and is having some pain there as well  The following portions of the patient's history were reviewed and updated as appropriate: allergies, current medications, past family history, past medical history, past social history, past surgical history and problem list         Review of Systems   Constitutional: Negative for chills and fever  HENT: Negative for congestion and sore throat  Respiratory: Negative for cough, chest tightness and shortness of breath  Cardiovascular: Negative for chest pain and leg swelling  Gastrointestinal: Negative for abdominal pain, constipation, diarrhea and vomiting  Genitourinary: Negative for dysuria  Musculoskeletal: Positive for arthralgias, back pain, myalgias and neck pain  Neurological: Positive for numbness and headaches  Negative for dizziness  Psychiatric/Behavioral: Negative for behavioral problems, self-injury and suicidal ideas  Objective:   Vitals:    06/13/22 1302   BP: 98/60   BP Location: Left arm   Patient Position: Sitting   Cuff Size: Adult   Pulse: 56   Resp: 19   Temp: (!) 97 °F (36 1 °C)   TempSrc: Temporal   SpO2: 97%         Physical Exam  Vitals and nursing note reviewed  Constitutional:       General: She is not in acute distress  Appearance: She is well-developed        Comments: Examined in motorized wheelchair   HENT:      Head: Normocephalic and atraumatic  Right Ear: External ear normal       Left Ear: External ear normal       Nose: Nose normal    Eyes:      Conjunctiva/sclera: Conjunctivae normal    Cardiovascular:      Rate and Rhythm: Normal rate and regular rhythm  Pulses: Normal pulses  Heart sounds: Normal heart sounds  Pulmonary:      Effort: Pulmonary effort is normal  No respiratory distress  Breath sounds: Normal breath sounds  No wheezing  Musculoskeletal:      Right wrist: Tenderness present  No swelling  Decreased range of motion  Cervical back: Normal range of motion and neck supple  Comments: Left wrist in splint and sling  Skin:     General: Skin is warm and dry  Neurological:      Mental Status: She is alert and oriented to person, place, and time     Psychiatric:         Behavior: Behavior normal

## 2022-06-14 ENCOUNTER — OFFICE VISIT (OUTPATIENT)
Dept: OBGYN CLINIC | Facility: HOSPITAL | Age: 52
End: 2022-06-14
Payer: MEDICARE

## 2022-06-14 VITALS
BODY MASS INDEX: 26.68 KG/M2 | SYSTOLIC BLOOD PRESSURE: 111 MMHG | WEIGHT: 145 LBS | HEART RATE: 68 BPM | HEIGHT: 62 IN | DIASTOLIC BLOOD PRESSURE: 74 MMHG

## 2022-06-14 DIAGNOSIS — S62.102A CLOSED FRACTURE OF LEFT WRIST, INITIAL ENCOUNTER: Primary | ICD-10-CM

## 2022-06-14 DIAGNOSIS — M25.532 PAIN IN LEFT WRIST: ICD-10-CM

## 2022-06-14 PROCEDURE — 99213 OFFICE O/P EST LOW 20 MIN: CPT | Performed by: ORTHOPAEDIC SURGERY

## 2022-06-14 RX ORDER — CEFAZOLIN SODIUM 2 G/50ML
2000 SOLUTION INTRAVENOUS ONCE
Status: CANCELLED | OUTPATIENT
Start: 2022-06-14 | End: 2022-06-14

## 2022-06-14 RX ORDER — CHLORHEXIDINE GLUCONATE 0.12 MG/ML
15 RINSE ORAL ONCE
Status: CANCELLED | OUTPATIENT
Start: 2022-06-14 | End: 2022-06-14

## 2022-06-14 RX ORDER — SODIUM CHLORIDE, SODIUM LACTATE, POTASSIUM CHLORIDE, CALCIUM CHLORIDE 600; 310; 30; 20 MG/100ML; MG/100ML; MG/100ML; MG/100ML
100 INJECTION, SOLUTION INTRAVENOUS CONTINUOUS
Status: CANCELLED | OUTPATIENT
Start: 2022-06-14

## 2022-06-14 NOTE — H&P (VIEW-ONLY)
H&P Exam - Orthopedics   Karmen Anderson 46 y o  female MRN: [id number]  Unit/Bed#:  Encounter: [id number]    Assessment/Plan     Assessment:  Left distal radius fracture  Left wrist pain  Plan:  Based on the patient's imaging, it is likely that she will benefit from having a surgery on the wrist   The patient is agreeable to this  The process for surgery was discussed with the patient  The surgery typically takes less than 1 hour  After this, she will be immobilized for approximately 2 weeks, and then we will allow her to work on range of motion  We discussed that the risks for this and any surgery include bleeding, blood clot, and infection, but that all of these are very unlikely to occur with this surgery  Complications specific to this surgery include hardware failure and the need for repeat surgery  Benefits include an eventual full return to function  All questions were answered to the patient's satisfaction  Surgical consent was signed at today's appointment and the surgery was scheduled  History of Present Illness   HPI:  Karmen Anderson is a 46 y o  female who presents with a left-distal radius fracture sustained 2 days ago on 6/12  At that time, the patient states that she slapped a door very hard out of anger and felt immediate pain in the wrist   She presented to the ER where she was placed into a sugar tongs splint  Today the patient reports significant pain and the entire wrist and hand  She takes only Percocet for the pain  She reports numbness and tingling over the volar aspects of the thumb and index fingers  She is disabled and does not do any dextrous work with the hand or fingers  Review of Systems   Constitutional: Negative for chills and fever  HENT: Negative for facial swelling, rhinorrhea and sore throat  Eyes: Negative for pain and redness  Respiratory: Negative for choking and shortness of breath  Cardiovascular: Negative for chest pain     Gastrointestinal: Negative for nausea and vomiting  Musculoskeletal: Positive for arthralgias (left wrist and hand pain)  Neurological: Negative for light-headedness and headaches  Historical Information   Past Medical History:   Diagnosis Date    ADHD     Anemia of chronic disease     Anxiety     Arthritis ?  Asthma     Bipolar disorder ([place] [address] )     Borderline personality disorder ([place] [address] )     Cataplexy     Chronic abdominal pain     Chronic kidney disease ?  CKD (chronic kidney disease) stage 3, GFR 30-59 ml/min (HCC)     Cushing syndrome (HCC)     Depression ?  Diabetes mellitus ([place] [address] )     DVT (deep venous thrombosis) (HCC)     GERD (gastroesophageal reflux disease)     Headache(784 0) 3 months    History of acute pancreatitis     felt secondary to Bactrim    History of transfusion     Hypertension     Liver disease     fatty liver    Microscopic polyangiitis (HCC)     Ovarian cyst     PTSD (post-traumatic stress disorder)     Self-inflicted injury     self inflicted skin wounds    Wegener's granulomatosis with renal involvement ([place] [address] ) 2015     Past Surgical History:   Procedure Laterality Date    ESOPHAGOGASTRODUODENOSCOPY  09/11/2015    mild antral gastritis    GASTRIC STIMULATOR IMPLANT SURGERY  06/25/2020    NM COLONOSCOPY FLX DX W/COLLJ SPEC WHEN PFRMD N/A 12/14/2018    adenoma removed from the transverse, hyperplastic polyp removed from the left colon    NM ESOPHAGOGASTRODUODENOSCOPY TRANSORAL DIAGNOSTIC N/A 12/14/2018    gastritis and scant coffee-ground material   Biopsies negative for H  pylori    RELEASE SCAR CONTRACTURE / GRAFT REPAIRS OF HAND Bilateral     UPPER GASTROINTESTINAL ENDOSCOPY  12/26/2019    Dr Clemencia Phillips   Botox to the pylorus     Social History   Social History     Substance and Sexual Activity   Alcohol Use Never     Social History     Substance and Sexual Activity   Drug Use Yes    Types: Marijuana    Comment: marijuana daily     Social History     Tobacco Use   Smoking Status Former Smoker    Packs/day: 1 00    Years: 10 00    Pack years: 10 00    Types: Cigarettes    Quit date: 2011    Years since quittin 4   Smokeless Tobacco Never Used   Tobacco Comment    Stopped smoking 11 years ago     Family History:   Family History   Problem Relation Age of Onset    Arthritis Mother     Depression Mother     Diabetes Mother     Mental illness Mother    [facility] Migraines Mother     No Known Problems Father     Colon cancer Neg Hx     Drug abuse Neg Hx         mother father    Mental illness Neg Hx         disorder, mother father    Cancer Neg Hx     Breast cancer Neg Hx        Meds/Allergies     Current Outpatient Medications:     acetaminophen (TYLENOL) 500 mg tablet, Take 2 tablets (1,000 mg total) by mouth every 8 (eight) hours as needed for mild pain, Disp: 60 tablet, Rfl: 1    Alcohol Swabs (Alcohol Pads) 70 % PADS, Use 4 (four) times a day, Disp: 400 each, Rfl: 2    amLODIPine (NORVASC) 5 mg tablet, TAKE ONE TABLET BY MOUTH ONCE DAILY, Disp: 90 tablet, Rfl: 1    amphetamine-dextroamphetamine (ADDERALL XR) 20 MG 24 hr capsule, Take 1 capsule (20 mg total) by mouth 2 (two) times a day Max Daily Amount: 40 mg, Disp: 60 capsule, Rfl: 0    Blood Glucose Monitoring Suppl (FreeStyle Lite) DEIRDRE, Use daily, Disp: 1 each, Rfl: 0    buPROPion (WELLBUTRIN XL) 300 mg 24 hr tablet, Take 1 tablet (300 mg total) by mouth every morning, Disp: 30 tablet, Rfl: 2    clobetasol (TEMOVATE) 0 05 % cream, APPLY TOPICALLY TO AFFECTED AREA TWICE A DAY, Disp: 60 g, Rfl: 0    Diclofenac Sodium (VOLTAREN) 1 %, Apply 2 g topically 4 (four) times a day, Disp: 100 g, Rfl: 0    docusate sodium (COLACE) 100 mg capsule, , Disp: , Rfl:     Easy Comfort Lancets MISC, USE TO TEST THE BLOOD SUGAR THREE TIMES A DAY, Disp: 300 each, Rfl: 10    glucose blood (FREESTYLE LITE) test strip, Use as instructed, Disp: 100 strip, Rfl: 0    hydrOXYzine HCL (ATARAX) 25 mg tablet, , Disp: , Rfl:     insulin aspart (NovoLOG FlexPen) 100 UNIT/ML injection pen, Inject 6 Units under the skin 3 (three) times a day with meals, Disp: 15 mL, Rfl: 0    insulin glargine (Toujeo Max SoloStar) 300 units/mL CONCENTRATED U-300 injection pen (2-unit dial), Inject 22 Units under the skin daily, Disp: 9 mL, Rfl: 0    Insulin Pen Needle (Pen Needles) 31G X 8 MM MISC, Inject under the skin 4 (four) times a day (with meals and at bedtime), Disp: 100 each, Rfl: 11    lidocaine (XYLOCAINE) 5 % ointment, APPLY TOPICALLY 2GM TWICE A DAY TO AFFECTED AREAS AS NEEDED FOR MILD PAIN, Disp: 250 g, Rfl: 8    Lidocaine Viscous HCl (XYLOCAINE) 2 % mucosal solution, Swish and spit 15 mL 4 (four) times a day as needed for mouth pain or discomfort, Disp: 15 mL, Rfl: 1    Linzess 72 MCG CAPS, TAKE ONE CAPSULE BY MOUTH ONCE DAILY, Disp: 90 capsule, Rfl: 10    ondansetron (ZOFRAN) 4 mg tablet, Take 1 tablet (4 mg total) by mouth every 8 (eight) hours as needed for nausea or vomiting for up to 7 doses, Disp: 7 tablet, Rfl: 0    ondansetron (ZOFRAN) 8 mg tablet, Take 1 tablet (8 mg total) by mouth every 8 (eight) hours as needed for nausea or vomiting, Disp: 30 tablet, Rfl: 2    oxyCODONE (ROXICODONE) 10 MG TABS, Take 1 tablet (10 mg total) by mouth every 4 (four) hours as needed for moderate pain Max Daily Amount: 60 mg, Disp: 180 tablet, Rfl: 0    pantoprazole (PROTONIX) 40 mg tablet, Take 1 tablet (40 mg total) by mouth 2 (two) times a day before meals, Disp: 180 tablet, Rfl: 1    pregabalin (LYRICA) 100 mg capsule, Take 1 capsule (100 mg total) by mouth 3 (three) times a day, Disp: 90 capsule, Rfl: 1    Probiotic Product (PROBIOTIC-10 PO), Take 1 tablet by mouth daily, Disp: , Rfl:     promethazine (PHENERGAN) 25 mg tablet, Take 1 tablet (25 mg total) by mouth every 6 (six) hours as needed for nausea or vomiting, Disp: 60 tablet, Rfl: 3    Restasis 0 05 % ophthalmic emulsion, , Disp: , Rfl:     scopolamine (TRANSDERM-SCOP) 1 5 mg/3 days TD 72 hr patch, Place 1 patch on the skin every third day, Disp: 10 patch, Rfl: 0    sucralfate (CARAFATE) 1 g/10 mL suspension, TAKE 10ML BY MOUTH THREE TIMES A DAY, Disp: 500 mL, Rfl: 2    cholecalciferol (VITAMIN D3) 1,000 units tablet, Take 2 tablets (2,000 Units total) by mouth daily for 20 days, Disp: 40 tablet, Rfl: 0    dicyclomine (BENTYL) 20 mg tablet, Take 1 tablet (20 mg total) by mouth in the morning and 1 tablet (20 mg total) in the evening  Do all this for 7 days  , Disp: 20 tablet, Rfl: 0    sucralfate (CARAFATE) 1 g/10 mL suspension, Take 10 mL (1 g total) by mouth in the morning and 10 mL (1 g total) at noon and 10 mL (1 g total) in the evening and 10 mL (1 g total) before bedtime  Do all this for 7 days  , Disp: 420 mL, Rfl: 0    Allergies   Allergen Reactions    Prozac [Fluoxetine Hcl]      SI    Bactrim [Sulfamethoxazole-Trimethoprim]      Pt "They think that is what cause the pancreatitis"     Flagyl [Metronidazole] Diarrhea and Abdominal Pain    Lamictal [Lamotrigine] GI Intolerance    Lithium Other (See Comments)    Haldol [Haloperidol] Other (See Comments)     "I don't like it"    Ibuprofen     Lexapro [Escitalopram Oxalate] Rash    Navane [Thiothixene]      SI    Other      "novaine?" antipsychotic       Objective   Vitals: Blood pressure 111/74, pulse 68, height 5' 2" (1 575 m), weight 65 8 kg (145 lb), not currently breastfeeding  ,Body mass index is 26 52 kg/m²  Invasive Devices  Report    Peripheral Intravenous Line  Duration           Peripheral IV 05/13/22 Right Antecubital 31 days                Physical Exam  Constitutional:       General: She is not in acute distress  Appearance: Normal appearance  She is not ill-appearing, toxic-appearing or diaphoretic  HENT:      Head: Normocephalic and atraumatic  Right Ear: External ear normal       Left Ear: External ear normal       Nose: Nose normal    Eyes:      Extraocular Movements: Extraocular movements intact  Conjunctiva/sclera: Conjunctivae normal       Pupils: Pupils are equal, round, and reactive to light  Cardiovascular:      Rate and Rhythm: Normal rate and regular rhythm  Pulmonary:      Effort: Pulmonary effort is normal       Breath sounds: Normal breath sounds  Abdominal:      General: Abdomen is flat  There is no distension  Skin:     General: Skin is warm and dry  Neurological:      Mental Status: She is alert and oriented to person, place, and time  Psychiatric:         Mood and Affect: Mood normal          Behavior: Behavior normal        Ortho Exam     Left Wrist:     Inspection:  The splint is C/D/I  The fingers are mildly edematous  They are not ecchymotic or erythematous      Range of Motion:  The patient is unable to move any of her fingers secondary to her significant pain      Sensation:  SILT over all fingers      Other:  Fingers WWP  Cap refill is less than 2 seconds  Lab Results: I have personally reviewed pertinent lab results  Imaging: I have personally reviewed pertinent films in PACS  There is a left distal radius fracture which was significantly dorsally prior to reduction  The wrist is in better alignment s/p reduction  EKG, Pathology, and Other Studies: I have personally reviewed pertinent reports  Code Status: Level 1 - Full Code  Advance Directive and Living Will: Not on File  Power of : Not on File  POLST: Not on File    Counseling / Coordination of Care  Total floor / unit time spent today 20 minutes  Greater than 50% of total time was spent with the patient and / or family counseling and / or coordination of care  A description of the counseling / coordination of care: Discussing, signing consent for, and scheduling left distal radius ORIF            Scribe Attestation    I,:  Alex Corona PA-C am acting as a scribe while in the presence of the attending physician :       I,:  Keturah Bello MD personally performed the services described in this documentation    as scribed in my presence :

## 2022-06-14 NOTE — PROGRESS NOTES
H&P Exam - Orthopedics   Sonya Rico 46 y o  female MRN: [id number]  Unit/Bed#:  Encounter: [id number]    Assessment/Plan     Assessment:  Left distal radius fracture  Left wrist pain  Plan:  Based on the patient's imaging, it is likely that she will benefit from having a surgery on the wrist   The patient is agreeable to this  The process for surgery was discussed with the patient  The surgery typically takes less than 1 hour  After this, she will be immobilized for approximately 2 weeks, and then we will allow her to work on range of motion  We discussed that the risks for this and any surgery include bleeding, blood clot, and infection, but that all of these are very unlikely to occur with this surgery  Complications specific to this surgery include hardware failure and the need for repeat surgery  Benefits include an eventual full return to function  All questions were answered to the patient's satisfaction  Surgical consent was signed at today's appointment and the surgery was scheduled  History of Present Illness   HPI:  Sonya Rico is a 46 y o  female who presents with a left-distal radius fracture sustained 2 days ago on 6/12  At that time, the patient states that she slapped a door very hard out of anger and felt immediate pain in the wrist   She presented to the ER where she was placed into a sugar tongs splint  Today the patient reports significant pain and the entire wrist and hand  She takes only Percocet for the pain  She reports numbness and tingling over the volar aspects of the thumb and index fingers  She is disabled and does not do any dextrous work with the hand or fingers  Review of Systems   Constitutional: Negative for chills and fever  HENT: Negative for facial swelling, rhinorrhea and sore throat  Eyes: Negative for pain and redness  Respiratory: Negative for choking and shortness of breath  Cardiovascular: Negative for chest pain     Gastrointestinal: Negative for nausea and vomiting  Musculoskeletal: Positive for arthralgias (left wrist and hand pain)  Neurological: Negative for light-headedness and headaches  Historical Information   Past Medical History:   Diagnosis Date    ADHD     Anemia of chronic disease     Anxiety     Arthritis ?  Asthma     Bipolar disorder ([place] [address] )     Borderline personality disorder ([place] [address] )     Cataplexy     Chronic abdominal pain     Chronic kidney disease ?  CKD (chronic kidney disease) stage 3, GFR 30-59 ml/min (HCC)     Cushing syndrome (HCC)     Depression ?  Diabetes mellitus ([place] [address] )     DVT (deep venous thrombosis) (HCC)     GERD (gastroesophageal reflux disease)     Headache(784 0) 3 months    History of acute pancreatitis     felt secondary to Bactrim    History of transfusion     Hypertension     Liver disease     fatty liver    Microscopic polyangiitis (HCC)     Ovarian cyst     PTSD (post-traumatic stress disorder)     Self-inflicted injury     self inflicted skin wounds    Wegener's granulomatosis with renal involvement ([place] [address] ) 2015     Past Surgical History:   Procedure Laterality Date    ESOPHAGOGASTRODUODENOSCOPY  09/11/2015    mild antral gastritis    GASTRIC STIMULATOR IMPLANT SURGERY  06/25/2020    MA COLONOSCOPY FLX DX W/COLLJ SPEC WHEN PFRMD N/A 12/14/2018    adenoma removed from the transverse, hyperplastic polyp removed from the left colon    MA ESOPHAGOGASTRODUODENOSCOPY TRANSORAL DIAGNOSTIC N/A 12/14/2018    gastritis and scant coffee-ground material   Biopsies negative for H  pylori    RELEASE SCAR CONTRACTURE / GRAFT REPAIRS OF HAND Bilateral     UPPER GASTROINTESTINAL ENDOSCOPY  12/26/2019    Dr Alina Guzmán   Botox to the pylorus     Social History   Social History     Substance and Sexual Activity   Alcohol Use Never     Social History     Substance and Sexual Activity   Drug Use Yes    Types: Marijuana    Comment: marijuana daily     Social History     Tobacco Use   Smoking Status Former Smoker    Packs/day: 1 00    Years: 10 00    Pack years: 10 00    Types: Cigarettes    Quit date: 2011    Years since quittin 4   Smokeless Tobacco Never Used   Tobacco Comment    Stopped smoking 11 years ago     Family History:   Family History   Problem Relation Age of Onset    Arthritis Mother     Depression Mother     Diabetes Mother     Mental illness Mother    Bonnie Splinter Migraines Mother     No Known Problems Father     Colon cancer Neg Hx     Drug abuse Neg Hx         mother father    Mental illness Neg Hx         disorder, mother father    Cancer Neg Hx     Breast cancer Neg Hx        Meds/Allergies     Current Outpatient Medications:     acetaminophen (TYLENOL) 500 mg tablet, Take 2 tablets (1,000 mg total) by mouth every 8 (eight) hours as needed for mild pain, Disp: 60 tablet, Rfl: 1    Alcohol Swabs (Alcohol Pads) 70 % PADS, Use 4 (four) times a day, Disp: 400 each, Rfl: 2    amLODIPine (NORVASC) 5 mg tablet, TAKE ONE TABLET BY MOUTH ONCE DAILY, Disp: 90 tablet, Rfl: 1    amphetamine-dextroamphetamine (ADDERALL XR) 20 MG 24 hr capsule, Take 1 capsule (20 mg total) by mouth 2 (two) times a day Max Daily Amount: 40 mg, Disp: 60 capsule, Rfl: 0    Blood Glucose Monitoring Suppl (FreeStyle Lite) DEIRDRE, Use daily, Disp: 1 each, Rfl: 0    buPROPion (WELLBUTRIN XL) 300 mg 24 hr tablet, Take 1 tablet (300 mg total) by mouth every morning, Disp: 30 tablet, Rfl: 2    clobetasol (TEMOVATE) 0 05 % cream, APPLY TOPICALLY TO AFFECTED AREA TWICE A DAY, Disp: 60 g, Rfl: 0    Diclofenac Sodium (VOLTAREN) 1 %, Apply 2 g topically 4 (four) times a day, Disp: 100 g, Rfl: 0    docusate sodium (COLACE) 100 mg capsule, , Disp: , Rfl:     Easy Comfort Lancets MISC, USE TO TEST THE BLOOD SUGAR THREE TIMES A DAY, Disp: 300 each, Rfl: 10    glucose blood (FREESTYLE LITE) test strip, Use as instructed, Disp: 100 strip, Rfl: 0    hydrOXYzine HCL (ATARAX) 25 mg tablet, , Disp: , Rfl:     insulin aspart (NovoLOG FlexPen) 100 UNIT/ML injection pen, Inject 6 Units under the skin 3 (three) times a day with meals, Disp: 15 mL, Rfl: 0    insulin glargine (Toujeo Max SoloStar) 300 units/mL CONCENTRATED U-300 injection pen (2-unit dial), Inject 22 Units under the skin daily, Disp: 9 mL, Rfl: 0    Insulin Pen Needle (Pen Needles) 31G X 8 MM MISC, Inject under the skin 4 (four) times a day (with meals and at bedtime), Disp: 100 each, Rfl: 11    lidocaine (XYLOCAINE) 5 % ointment, APPLY TOPICALLY 2GM TWICE A DAY TO AFFECTED AREAS AS NEEDED FOR MILD PAIN, Disp: 250 g, Rfl: 8    Lidocaine Viscous HCl (XYLOCAINE) 2 % mucosal solution, Swish and spit 15 mL 4 (four) times a day as needed for mouth pain or discomfort, Disp: 15 mL, Rfl: 1    Linzess 72 MCG CAPS, TAKE ONE CAPSULE BY MOUTH ONCE DAILY, Disp: 90 capsule, Rfl: 10    ondansetron (ZOFRAN) 4 mg tablet, Take 1 tablet (4 mg total) by mouth every 8 (eight) hours as needed for nausea or vomiting for up to 7 doses, Disp: 7 tablet, Rfl: 0    ondansetron (ZOFRAN) 8 mg tablet, Take 1 tablet (8 mg total) by mouth every 8 (eight) hours as needed for nausea or vomiting, Disp: 30 tablet, Rfl: 2    oxyCODONE (ROXICODONE) 10 MG TABS, Take 1 tablet (10 mg total) by mouth every 4 (four) hours as needed for moderate pain Max Daily Amount: 60 mg, Disp: 180 tablet, Rfl: 0    pantoprazole (PROTONIX) 40 mg tablet, Take 1 tablet (40 mg total) by mouth 2 (two) times a day before meals, Disp: 180 tablet, Rfl: 1    pregabalin (LYRICA) 100 mg capsule, Take 1 capsule (100 mg total) by mouth 3 (three) times a day, Disp: 90 capsule, Rfl: 1    Probiotic Product (PROBIOTIC-10 PO), Take 1 tablet by mouth daily, Disp: , Rfl:     promethazine (PHENERGAN) 25 mg tablet, Take 1 tablet (25 mg total) by mouth every 6 (six) hours as needed for nausea or vomiting, Disp: 60 tablet, Rfl: 3    Restasis 0 05 % ophthalmic emulsion, , Disp: , Rfl:     scopolamine (TRANSDERM-SCOP) 1 5 mg/3 days TD 72 hr patch, Place 1 patch on the skin every third day, Disp: 10 patch, Rfl: 0    sucralfate (CARAFATE) 1 g/10 mL suspension, TAKE 10ML BY MOUTH THREE TIMES A DAY, Disp: 500 mL, Rfl: 2    cholecalciferol (VITAMIN D3) 1,000 units tablet, Take 2 tablets (2,000 Units total) by mouth daily for 20 days, Disp: 40 tablet, Rfl: 0    dicyclomine (BENTYL) 20 mg tablet, Take 1 tablet (20 mg total) by mouth in the morning and 1 tablet (20 mg total) in the evening  Do all this for 7 days  , Disp: 20 tablet, Rfl: 0    sucralfate (CARAFATE) 1 g/10 mL suspension, Take 10 mL (1 g total) by mouth in the morning and 10 mL (1 g total) at noon and 10 mL (1 g total) in the evening and 10 mL (1 g total) before bedtime  Do all this for 7 days  , Disp: 420 mL, Rfl: 0    Allergies   Allergen Reactions    Prozac [Fluoxetine Hcl]      SI    Bactrim [Sulfamethoxazole-Trimethoprim]      Pt "They think that is what cause the pancreatitis"     Flagyl [Metronidazole] Diarrhea and Abdominal Pain    Lamictal [Lamotrigine] GI Intolerance    Lithium Other (See Comments)    Haldol [Haloperidol] Other (See Comments)     "I don't like it"    Ibuprofen     Lexapro [Escitalopram Oxalate] Rash    Navane [Thiothixene]      SI    Other      "novaine?" antipsychotic       Objective   Vitals: Blood pressure 111/74, pulse 68, height 5' 2" (1 575 m), weight 65 8 kg (145 lb), not currently breastfeeding  ,Body mass index is 26 52 kg/m²  Invasive Devices  Report    Peripheral Intravenous Line  Duration           Peripheral IV 05/13/22 Right Antecubital 31 days                Physical Exam  Constitutional:       General: She is not in acute distress  Appearance: Normal appearance  She is not ill-appearing, toxic-appearing or diaphoretic  HENT:      Head: Normocephalic and atraumatic  Right Ear: External ear normal       Left Ear: External ear normal       Nose: Nose normal    Eyes:      Extraocular Movements: Extraocular movements intact  Conjunctiva/sclera: Conjunctivae normal       Pupils: Pupils are equal, round, and reactive to light  Cardiovascular:      Rate and Rhythm: Normal rate and regular rhythm  Pulmonary:      Effort: Pulmonary effort is normal       Breath sounds: Normal breath sounds  Abdominal:      General: Abdomen is flat  There is no distension  Skin:     General: Skin is warm and dry  Neurological:      Mental Status: She is alert and oriented to person, place, and time  Psychiatric:         Mood and Affect: Mood normal          Behavior: Behavior normal        Ortho Exam     Left Wrist:     Inspection:  The splint is C/D/I  The fingers are mildly edematous  They are not ecchymotic or erythematous      Range of Motion:  The patient is unable to move any of her fingers secondary to her significant pain      Sensation:  SILT over all fingers      Other:  Fingers WWP  Cap refill is less than 2 seconds  Lab Results: I have personally reviewed pertinent lab results  Imaging: I have personally reviewed pertinent films in PACS  There is a left distal radius fracture which was significantly dorsally prior to reduction  The wrist is in better alignment s/p reduction  EKG, Pathology, and Other Studies: I have personally reviewed pertinent reports  Code Status: Level 1 - Full Code  Advance Directive and Living Will: Not on File  Power of : Not on File  POLST: Not on File    Counseling / Coordination of Care  Total floor / unit time spent today 20 minutes  Greater than 50% of total time was spent with the patient and / or family counseling and / or coordination of care  A description of the counseling / coordination of care: Discussing, signing consent for, and scheduling left distal radius ORIF            Scribe Attestation    I,:  Marie Pa PA-C am acting as a scribe while in the presence of the attending physician :       I,:  Xiomara Maria MD personally performed the services described in this documentation    as scribed in my presence :

## 2022-06-17 ENCOUNTER — TELEPHONE (OUTPATIENT)
Dept: OBGYN CLINIC | Facility: OTHER | Age: 52
End: 2022-06-17

## 2022-06-17 NOTE — TELEPHONE ENCOUNTER
Patient called in , she needed to cancel her surgery  She would like to reschedule her surgery  ,    She also said something about her needed aide, because hers quit      If she cannot have surgery within a reasonable time frame she would like to know what she can do in the meantime    C/b # [phone]

## 2022-06-17 NOTE — TELEPHONE ENCOUNTER
Returned patient's call, rescheduled patient to 6/23 for now  Patient is in contact with the agency for a new aide but has not gotten any information yet  I spoke to Katya Rodriguez who advised that their is a  Devin Jeter that I can reach out to to see if she would be able to assist this patient in any way

## 2022-06-20 NOTE — TELEPHONE ENCOUNTER
Returned patient's call and left VM   Advised I will cancel surgery at this time, but if anything changes (she is able to get an aide, or if I hear back from Aleyda Costello) I will contact patient to r/s

## 2022-06-21 ENCOUNTER — ANESTHESIA EVENT (OUTPATIENT)
Dept: PERIOP | Facility: HOSPITAL | Age: 52
End: 2022-06-21
Payer: MEDICARE

## 2022-06-21 ENCOUNTER — TELEPHONE (OUTPATIENT)
Dept: OBGYN CLINIC | Facility: HOSPITAL | Age: 52
End: 2022-06-21

## 2022-06-21 NOTE — TELEPHONE ENCOUNTER
Spoke to patient  She stated she is unable to come to the office for 3 days due to [facility] requiring 3 days notice  She is unable to get in and out of a vehicle on her own  Stated she needs assistance with the wheelchair  Chiara huizar be able to help her  She stated she has been 2 or 3 days without a splint  She stated she is close to the ER  Advised her to go to the ER for stabilization since she is out of options and should not remain without a splint any longer  She stated she would like a call to reschedule surgery for next week  Please advise

## 2022-06-21 NOTE — TELEPHONE ENCOUNTER
Patient calling back stating that she has obtained an aide and is now ready to schedule surgery  Caller asked to speak to surgery scheduler

## 2022-06-21 NOTE — ADDENDUM NOTE
Addended by: Yousuf Hackett on: 6/21/2022 03:15 PM     Modules accepted: Sophia Valiente
Universal Safety Interventions

## 2022-06-21 NOTE — TELEPHONE ENCOUNTER
DR Nuzhat Ramírez  RE: Cancellation of surgery and splint  CB: [phone]    Patient  called stating that she had to cancel surgery  She is stating that her splint fell into the pool and it is currently warped  She is wondering what she should do in the meantime until she gets surgery rescheduled  She is reporting pain up to her shoulder   Caller asked to speak to clinical team

## 2022-06-22 ENCOUNTER — PROCEDURE VISIT (OUTPATIENT)
Dept: PAIN MEDICINE | Facility: CLINIC | Age: 52
End: 2022-06-22
Payer: MEDICARE

## 2022-06-22 DIAGNOSIS — M79.18 MYOFASCIAL PAIN SYNDROME: Primary | ICD-10-CM

## 2022-06-22 PROCEDURE — 76942 ECHO GUIDE FOR BIOPSY: CPT | Performed by: PHYSICAL MEDICINE & REHABILITATION

## 2022-06-22 PROCEDURE — 20552 NJX 1/MLT TRIGGER POINT 1/2: CPT | Performed by: PHYSICAL MEDICINE & REHABILITATION

## 2022-06-22 RX ORDER — METHYLPREDNISOLONE ACETATE 40 MG/ML
40 INJECTION, SUSPENSION INTRA-ARTICULAR; INTRALESIONAL; INTRAMUSCULAR; SOFT TISSUE ONCE
Status: DISCONTINUED | OUTPATIENT
Start: 2022-06-22 | End: 2022-06-22

## 2022-06-22 RX ORDER — LIDOCAINE HYDROCHLORIDE 10 MG/ML
5 INJECTION, SOLUTION INFILTRATION; PERINEURAL ONCE
Status: DISCONTINUED | OUTPATIENT
Start: 2022-06-22 | End: 2022-06-22

## 2022-06-22 RX ADMIN — LIDOCAINE HYDROCHLORIDE 4 ML: 10 INJECTION, SOLUTION INFILTRATION; PERINEURAL at 15:21

## 2022-06-22 RX ADMIN — METHYLPREDNISOLONE ACETATE 40 MG: 40 INJECTION, SUSPENSION INTRA-ARTICULAR; INTRALESIONAL; INTRAMUSCULAR; SOFT TISSUE at 15:21

## 2022-06-22 NOTE — PROGRESS NOTES
Indication:  Muscular pain  Preprocedure diagnosis:  1  Myofascial pain syndrome  Postprocedure diagnosis:  1  Myofascial pain syndrome    Procedure:  Ultrasound-guided bilateral thoracolumbar paraspinal muscle trigger point injection(s)  After discussing the risks, benefits, and alternatives to the procedure, the patient expressed understanding and wished to proceed  The patient was brought to the procedure suite and placed in the prone position  A procedural pause was conducted to verify:  correct patient identity, procedure to be performed and as applicable, correct side and site, correct patient position, and availability of implants, special equipment or special requirements  A simple surgical tray was used  Prior to the procedure, the thoracolumbar paraspinal musculature was examined with a 12 MHz linear transducer to visualize the targeted trigger points and determine the optimal needle path  Following this, the area was prepared with a ChloraPrep scrub, then re-examined using the same transducer, a sterile ultrasound transducer cover, and sterile ultrasound transducer gel  Thereafter, using ultrasound guidance, a 2 5-inch 25-gauge needle was advanced into the targeted trigger points  After visualization of the tip and negative aspiration for blood, a mixture of 1% lidocaine with 40 milligrams/milliliter Depo-Medrol was injected into the targeted trigger points  Following the injection, the needle was withdrawn  The patient tolerated the procedure well and there were no apparent complications  After an appropriate amount of observation, the patient was dismissed from the clinic in good condition under their own power

## 2022-06-23 ENCOUNTER — HOSPITAL ENCOUNTER (OUTPATIENT)
Facility: HOSPITAL | Age: 52
Setting detail: OUTPATIENT SURGERY
Discharge: HOME | End: 2022-06-23
Attending: ORTHOPAEDIC SURGERY | Admitting: ORTHOPAEDIC SURGERY
Payer: MEDICARE

## 2022-06-23 ENCOUNTER — HOSPITAL ENCOUNTER (OUTPATIENT)
Dept: RADIOLOGY | Facility: HOSPITAL | Age: 52
End: 2022-06-23
Payer: MEDICARE

## 2022-06-23 ENCOUNTER — HOSPITAL ENCOUNTER (OUTPATIENT)
Dept: RADIOLOGY | Facility: HOSPITAL | Age: 52
Setting detail: OUTPATIENT SURGERY
Discharge: HOME | End: 2022-06-23
Payer: MEDICARE

## 2022-06-23 ENCOUNTER — ANESTHESIA (OUTPATIENT)
Dept: PERIOP | Facility: HOSPITAL | Age: 52
End: 2022-06-23
Payer: MEDICARE

## 2022-06-23 VITALS
SYSTOLIC BLOOD PRESSURE: 189 MMHG | WEIGHT: 145 LBS | OXYGEN SATURATION: 98 % | BODY MASS INDEX: 26.68 KG/M2 | RESPIRATION RATE: 14 BRPM | DIASTOLIC BLOOD PRESSURE: 89 MMHG | TEMPERATURE: 95.3 F | HEIGHT: 62 IN | HEART RATE: 58 BPM

## 2022-06-23 DIAGNOSIS — S62.102A CLOSED FRACTURE OF LEFT WRIST, INITIAL ENCOUNTER: ICD-10-CM

## 2022-06-23 DIAGNOSIS — S62.102D CLOSED FRACTURE OF LEFT WRIST WITH ROUTINE HEALING, SUBSEQUENT ENCOUNTER: Primary | ICD-10-CM

## 2022-06-23 DIAGNOSIS — Z98.890 S/P ORIF (OPEN REDUCTION INTERNAL FIXATION) FRACTURE: ICD-10-CM

## 2022-06-23 DIAGNOSIS — Z87.81 S/P ORIF (OPEN REDUCTION INTERNAL FIXATION) FRACTURE: ICD-10-CM

## 2022-06-23 PROBLEM — R11.2 PONV (POSTOPERATIVE NAUSEA AND VOMITING): Chronic | Status: ACTIVE | Noted: 2022-06-23

## 2022-06-23 LAB
GLUCOSE SERPL-MCNC: 108 MG/DL (ref 65–140)
GLUCOSE SERPL-MCNC: 90 MG/DL (ref 65–140)

## 2022-06-23 PROCEDURE — C1713 ANCHOR/SCREW BN/BN,TIS/BN: HCPCS | Performed by: ORTHOPAEDIC SURGERY

## 2022-06-23 PROCEDURE — 82948 REAGENT STRIP/BLOOD GLUCOSE: CPT

## 2022-06-23 PROCEDURE — 25609 OPTX DST RD XART FX/EP SEP3+: CPT | Performed by: ORTHOPAEDIC SURGERY

## 2022-06-23 PROCEDURE — 73110 X-RAY EXAM OF WRIST: CPT

## 2022-06-23 DEVICE — 2.4MM CORTEX SCREW SLF-TPNG WITH T8 STARDRIVE RECESS 24MM: Type: IMPLANTABLE DEVICE | Site: WRIST | Status: FUNCTIONAL

## 2022-06-23 DEVICE — 2.4MM VA-LCP 2-COL DSTL RAD PL NRW 6H HD/3H SHAFT/LT
Type: IMPLANTABLE DEVICE | Site: WRIST | Status: FUNCTIONAL
Brand: VA-LCP

## 2022-06-23 DEVICE — 2.4MM CORTEX SCREW SLF-TPNG WITH T8 STARDRIVE RECESS 14MM: Type: IMPLANTABLE DEVICE | Site: WRIST | Status: FUNCTIONAL

## 2022-06-23 DEVICE — 2.4MM CORTEX SCREW SLF-TPNG WITH T8 STARDRIVE RECESS 12MM: Type: IMPLANTABLE DEVICE | Site: WRIST | Status: FUNCTIONAL

## 2022-06-23 DEVICE — 2.4MM CORTEX SCREW SLF-TPNG WITH T8 STARDRIVE RECESS 22MM: Type: IMPLANTABLE DEVICE | Site: WRIST | Status: FUNCTIONAL

## 2022-06-23 DEVICE — 2.4MM VA LOCKING SCREW STARDRIVE 18MM: Type: IMPLANTABLE DEVICE | Site: WRIST | Status: FUNCTIONAL

## 2022-06-23 RX ORDER — OXYCODONE HYDROCHLORIDE 5 MG/1
5 TABLET ORAL EVERY 4 HOURS PRN
Status: DISCONTINUED | OUTPATIENT
Start: 2022-06-23 | End: 2022-06-23 | Stop reason: HOSPADM

## 2022-06-23 RX ORDER — ACETAMINOPHEN 325 MG/1
650 TABLET ORAL EVERY 6 HOURS PRN
Status: DISCONTINUED | OUTPATIENT
Start: 2022-06-23 | End: 2022-06-23 | Stop reason: HOSPADM

## 2022-06-23 RX ORDER — HYDROMORPHONE HCL IN WATER/PF 6 MG/30 ML
0.2 PATIENT CONTROLLED ANALGESIA SYRINGE INTRAVENOUS
Status: DISCONTINUED | OUTPATIENT
Start: 2022-06-23 | End: 2022-06-23 | Stop reason: HOSPADM

## 2022-06-23 RX ORDER — FENTANYL CITRATE/PF 50 MCG/ML
25 SYRINGE (ML) INJECTION
Status: DISCONTINUED | OUTPATIENT
Start: 2022-06-23 | End: 2022-06-23 | Stop reason: HOSPADM

## 2022-06-23 RX ORDER — ONDANSETRON 2 MG/ML
INJECTION INTRAMUSCULAR; INTRAVENOUS AS NEEDED
Status: DISCONTINUED | OUTPATIENT
Start: 2022-06-23 | End: 2022-06-23

## 2022-06-23 RX ORDER — OXYCODONE HYDROCHLORIDE 10 MG/1
10 TABLET ORAL EVERY 4 HOURS PRN
Status: DISCONTINUED | OUTPATIENT
Start: 2022-06-23 | End: 2022-06-23 | Stop reason: HOSPADM

## 2022-06-23 RX ORDER — PROPOFOL 10 MG/ML
INJECTION, EMULSION INTRAVENOUS AS NEEDED
Status: DISCONTINUED | OUTPATIENT
Start: 2022-06-23 | End: 2022-06-23

## 2022-06-23 RX ORDER — CEFAZOLIN SODIUM 1 G/3ML
INJECTION, POWDER, FOR SOLUTION INTRAMUSCULAR; INTRAVENOUS AS NEEDED
Status: DISCONTINUED | OUTPATIENT
Start: 2022-06-23 | End: 2022-06-23

## 2022-06-23 RX ORDER — MIDAZOLAM HYDROCHLORIDE 2 MG/2ML
INJECTION, SOLUTION INTRAMUSCULAR; INTRAVENOUS AS NEEDED
Status: DISCONTINUED | OUTPATIENT
Start: 2022-06-23 | End: 2022-06-23

## 2022-06-23 RX ORDER — SODIUM CHLORIDE, SODIUM LACTATE, POTASSIUM CHLORIDE, CALCIUM CHLORIDE 600; 310; 30; 20 MG/100ML; MG/100ML; MG/100ML; MG/100ML
100 INJECTION, SOLUTION INTRAVENOUS CONTINUOUS
Status: DISCONTINUED | OUTPATIENT
Start: 2022-06-23 | End: 2022-06-23 | Stop reason: HOSPADM

## 2022-06-23 RX ORDER — MAGNESIUM HYDROXIDE 1200 MG/15ML
LIQUID ORAL AS NEEDED
Status: DISCONTINUED | OUTPATIENT
Start: 2022-06-23 | End: 2022-06-23 | Stop reason: HOSPADM

## 2022-06-23 RX ORDER — CEFAZOLIN SODIUM 2 G/50ML
2000 SOLUTION INTRAVENOUS ONCE
Status: DISCONTINUED | OUTPATIENT
Start: 2022-06-23 | End: 2022-06-23 | Stop reason: HOSPADM

## 2022-06-23 RX ORDER — ONDANSETRON 2 MG/ML
4 INJECTION INTRAMUSCULAR; INTRAVENOUS ONCE AS NEEDED
Status: DISCONTINUED | OUTPATIENT
Start: 2022-06-23 | End: 2022-06-23 | Stop reason: HOSPADM

## 2022-06-23 RX ORDER — CHLORHEXIDINE GLUCONATE 0.12 MG/ML
15 RINSE ORAL ONCE
Status: DISCONTINUED | OUTPATIENT
Start: 2022-06-23 | End: 2022-06-23

## 2022-06-23 RX ORDER — FENTANYL CITRATE 50 UG/ML
INJECTION, SOLUTION INTRAMUSCULAR; INTRAVENOUS AS NEEDED
Status: DISCONTINUED | OUTPATIENT
Start: 2022-06-23 | End: 2022-06-23

## 2022-06-23 RX ORDER — BUPIVACAINE HYDROCHLORIDE 2.5 MG/ML
INJECTION, SOLUTION EPIDURAL; INFILTRATION; INTRACAUDAL
Status: COMPLETED | OUTPATIENT
Start: 2022-06-23 | End: 2022-06-23

## 2022-06-23 RX ORDER — DEXAMETHASONE SODIUM PHOSPHATE 10 MG/ML
INJECTION, SOLUTION INTRAMUSCULAR; INTRAVENOUS AS NEEDED
Status: DISCONTINUED | OUTPATIENT
Start: 2022-06-23 | End: 2022-06-23

## 2022-06-23 RX ORDER — LIDOCAINE HYDROCHLORIDE 10 MG/ML
INJECTION, SOLUTION EPIDURAL; INFILTRATION; INTRACAUDAL; PERINEURAL AS NEEDED
Status: DISCONTINUED | OUTPATIENT
Start: 2022-06-23 | End: 2022-06-23

## 2022-06-23 RX ORDER — GLYCOPYRROLATE 0.2 MG/ML
INJECTION INTRAMUSCULAR; INTRAVENOUS AS NEEDED
Status: DISCONTINUED | OUTPATIENT
Start: 2022-06-23 | End: 2022-06-23

## 2022-06-23 RX ORDER — ONDANSETRON 2 MG/ML
4 INJECTION INTRAMUSCULAR; INTRAVENOUS EVERY 6 HOURS PRN
Status: DISCONTINUED | OUTPATIENT
Start: 2022-06-23 | End: 2022-06-23 | Stop reason: HOSPADM

## 2022-06-23 RX ADMIN — CEFAZOLIN SODIUM 2000 MG: 1 INJECTION, POWDER, FOR SOLUTION INTRAMUSCULAR; INTRAVENOUS at 14:05

## 2022-06-23 RX ADMIN — MIDAZOLAM 2 MG: 1 INJECTION INTRAMUSCULAR; INTRAVENOUS at 12:56

## 2022-06-23 RX ADMIN — DEXAMETHASONE SODIUM PHOSPHATE 5 MG: 10 INJECTION, SOLUTION INTRAMUSCULAR; INTRAVENOUS at 13:10

## 2022-06-23 RX ADMIN — GLYCOPYRROLATE 0.2 MG: 0.2 INJECTION, SOLUTION INTRAMUSCULAR; INTRAVENOUS at 14:11

## 2022-06-23 RX ADMIN — ONDANSETRON 4 MG: 2 INJECTION INTRAMUSCULAR; INTRAVENOUS at 15:18

## 2022-06-23 RX ADMIN — LIDOCAINE HYDROCHLORIDE 50 MG: 10 INJECTION, SOLUTION EPIDURAL; INFILTRATION; INTRACAUDAL; PERINEURAL at 13:52

## 2022-06-23 RX ADMIN — FENTANYL CITRATE 50 MCG: 50 INJECTION INTRAMUSCULAR; INTRAVENOUS at 14:59

## 2022-06-23 RX ADMIN — SODIUM CHLORIDE, SODIUM LACTATE, POTASSIUM CHLORIDE, AND CALCIUM CHLORIDE 100 ML/HR: .6; .31; .03; .02 INJECTION, SOLUTION INTRAVENOUS at 12:22

## 2022-06-23 RX ADMIN — PROPOFOL 60 MG: 10 INJECTION, EMULSION INTRAVENOUS at 13:55

## 2022-06-23 RX ADMIN — FENTANYL CITRATE 50 MCG: 50 INJECTION INTRAMUSCULAR; INTRAVENOUS at 12:56

## 2022-06-23 RX ADMIN — PHENYLEPHRINE HYDROCHLORIDE 50 MCG/MIN: 10 INJECTION INTRAVENOUS at 14:02

## 2022-06-23 RX ADMIN — PROPOFOL 140 MG: 10 INJECTION, EMULSION INTRAVENOUS at 13:52

## 2022-06-23 RX ADMIN — BUPIVACAINE HYDROCHLORIDE 25 ML: 2.5 INJECTION, SOLUTION EPIDURAL; INFILTRATION; INTRACAUDAL at 13:10

## 2022-06-23 NOTE — DISCHARGE INSTRUCTIONS
Discharge Instructions - Orthopedics  Gabriella Cruz 46 y o  female MRN: [id number]  Unit/Bed#: [facility] 05    Weight Bearing Status:                                           Remain non-weight bearing to the left upper extremity  The sling may be utilized until the block wears off and feeling is regained in the arm and hand  Then the sling may be used for comfort  DVT prophylaxis  None necessary  Pain:  Take over-the-counter Tylenol for mild or moderate pain  Save the oxycodone for severe pain  The hand may be elevated and iced to help with swelling  Dressing Instructions: On post-op day 3 (6/26) remove dressings currently on the wrist   If there is a yellow strip of xeroform, this can stay  Then re-cover the wound in 2-3 gauze pads and re-wrap in a fresh ace bandage  Then apply the Velcro wrist splint over these dressings and leave until follow-up  Appt Instructions: If you do not have your appointment, please call the clinic at [phone]  Follow-up should be with Dr Wisam Sosa in 2 weeks  Otherwise followup as scheduled     Contact the office sooner if you experience any increased numbness/tingling in the extremities        Miscellaneous:  None

## 2022-06-23 NOTE — ANESTHESIA POSTPROCEDURE EVALUATION
Post-Op Assessment Note    CV Status:  Stable  Pain Score: 0    Pain management: adequate     Mental Status:  Arousable   Hydration Status:  Stable   PONV Controlled:  Controlled   Airway Patency:  Patent      Post Op Vitals Reviewed: Yes      Staff: CRNA         No complications documented      BP   158/91   Temp 98 5   Pulse 66   Resp 18   SpO2 100

## 2022-06-23 NOTE — OP NOTE
OPERATIVE REPORT  PATIENT NAME: Ry Jaimes    :  1970  MRN: [id number]  Pt Location: [facility] OR ROOM 09    SURGERY DATE: 2022    Surgeon(s) and Role:     * Rei Leigh MD - Primary     * Luz Singer MD - [id number], [place] - Assisting     * Claudeen Hose, PA-C - Assisting    Preop Diagnosis:  Closed fracture of left wrist, initial encounter [S6 102A]    Post-Op Diagnosis Codes:     * Closed fracture of left wrist, initial encounter [S62 102A]    Procedure(s) (LRB):  OPEN REDUCTION W/ INTERNAL FIXATION (ORIF) RADIUS / Mingo Damon (WRIST) (Left)    Specimen(s):  * No specimens in log *    Estimated Blood Loss:   Minimal    Drains:  * No LDAs found *    Anesthesia Type:   General w/ Regional    Operative Indications:  Closed fracture of left wrist, initial encounter [O46315C]  40-year-old female status post hitting her left wrist sustaining a left intra-articular distal radius fracture  Patient was indicated for operative fixation of her left distal radius to improve alignment of the fracture  Discussion was had with the patient regarding risks, benefits, and alternatives of her treatment options  The patient opted to proceed with open reduction internal fixation of her left distal radius  Operative Findings:  Left distal radius fracture with intra-articular extension status post open reduction internal fixation    Complications:   None    Procedure and Technique:  The patient was met and examined in the preoperative holding area and found to be neurovascularly intact to the median, radial, ulnar distributions with 2+ radial pulse  Opportunity was provided for any additional questions to be answered and the patient was brought back to the operating room where she underwent general anesthesia and was transferred to the hand table    She was prepped and draped in the usual sterile manner and a time out was called confirming the patient's name, operative extremity, implants were present, implant representative was in the room, c-arm was present, ancef was given pre-operatively and that the prep was dry  All present agreed  After time-out was performed the extremity was elevated and exsanguinated with an Esmarch bandage and tourniquet was raised to 250 mmHg  A trans FCR approach was then performed to the distal radius taking care to protect neurovascular structures while maintaining strict hemostasis with bipolar electrocautery  Once the fracture was visualized the fracture was exposed and cleared with irrigation and a [place]  Traction was then placed on the index and long fingers using weights off the edge of the hand table to restore fracture length  A reduction maneuver was then used and position of the fracture was confirmed under fluoroscopy  A distal radius plate was then positioned on the volar distal radius and secured using K-wires  The position was confirmed under biplanar fluoroscopy and once satisfied with the position a 2 4 mm cortical screw was placed in the oblong hole to secure the plate to the bone  A 2 4 cortical screw was then placed in the distal row to bring the distal fragment to the plate  Satisfied with the reduction, the remaining distal row was filled with 2 4 mm locking screws and the cortical screw was exchanged with a 2 4 mm locking screw  The traction was then released off the fingers  The K-wire was then removed and the final 2 proximal holes over the shaft were drilled for 2 4 mm cortical screws  All screws were tightened and final position was confirmed under fluoroscopy  The tourniquet was then released and hemostasis was achieved  The wound was copiously irrigated with normal saline  The subcutaneous tissues were closed using 2 0 Vicryl followed by 2 0 nylon and horizontal mattress fashion  Wounds were dressed with Xeroform, 4 x 4, Webril and an Ace wrap and placed in a sling    The patient was then awakened per anesthesia protocol and transferred back to the stretcher where she was taken to the postanesthesia care unit in stable condition  In the PACU she was placed in a removable wrist brace for the operative extremity  The patient is to remain nonweightbearing to the left upper extremity in a removable wrist brace she will follow-up in 2 weeks for wound check and x-rays  Dr Kaitlyn Swanson was present for the entirety of the procedure        Patient Disposition:  PACU       SIGNATURE: Magdaleno Ramirez MD  DATE: June 23, 2022  TIME: 3:28 PM

## 2022-06-23 NOTE — ANESTHESIA PREPROCEDURE EVALUATION
Procedure:  OPEN REDUCTION W/ INTERNAL FIXATION (ORIF) RADIUS / ULNA (WRIST) (Left Wrist)    Relevant Problems   ANESTHESIA   (+) PONV (postoperative nausea and vomiting)      CARDIO   (+) Benign essential HTN   (+) Granulomatosis with polyangiitis (HCC)   (+) Sinus bradycardia      GI/HEPATIC   (+) Gastroesophageal reflux disease   (+) Pancreatitis      /RENAL   (+) Chronic kidney disease, stage 4 (severe) (HCC)      HEMATOLOGY   (+) Anemia of chronic disease      MUSCULOSKELETAL   (+) Low back pain radiating to left lower extremity   (+) Lumbar spondylosis   (+) Myofascial pain syndrome   (+) Sacroiliitis (HCC)      NEURO/PSYCH   (+) Chronic pain   (+) Chronic pelvic pain in female   (+) Chronic right shoulder pain   (+) Continuous opioid dependence (HCC)   (+) Depression   (+) Myofascial pain syndrome   (+) PTSD (post-traumatic stress disorder)      PULMONARY   (+) Asthma   (+) Smoking      Other   (+) Bipolar 1 disorder (HCC)   (+) Chronic narcotic use   (+) Difficulty ventilating with mask   (+) Marijuana use   (+) Schizo-affective schizophrenia (HCC)      Adequately NPO  Physical Exam    Airway    Mallampati score: II  TM Distance: >3 FB  Neck ROM: full     Dental   Comment: Multiple missing teeth  Denies loose or chipped teeth,     Cardiovascular  Rhythm: regular, Rate: normal,     Pulmonary  Pulmonary exam normal     Other Findings        Anesthesia Plan  ASA Score- 3     Anesthesia Type- general with ASA Monitors  Additional Monitors:   Airway Plan: LMA  Plan Factors-Exercise tolerance (METS): <4 METS  Chart reviewed  EKG reviewed  Existing labs reviewed  Patient summary reviewed  Patient is a current smoker  Obstructive sleep apnea risk education given perioperatively  Induction- intravenous  Postoperative Plan- Plan for postoperative opioid use  Informed Consent- Anesthetic plan and risks discussed with patient  I personally reviewed this patient with the CRNA  Discussed and agreed on the Anesthesia Plan with the RAFFAELE Lea

## 2022-06-23 NOTE — ANESTHESIA PROCEDURE NOTES
Peripheral Block    Patient location during procedure: holding area  Start time: 6/23/2022 1:05 PM  Reason for block: at surgeon's request and post-op pain management  Staffing  Performed: CRNA   Anesthesiologist: Nathan Mitchell DO  Resident/CRNA: Mary Salas CRNA  Preanesthetic Checklist  Completed: patient identified, IV checked, site marked, risks and benefits discussed, surgical consent, monitors and equipment checked, pre-op evaluation and timeout performed  Peripheral Block  Patient position: sitting  Prep: ChloraPrep  Patient monitoring: heart rate, cardiac monitor, continuous pulse ox and frequent blood pressure checks  Block type: supraclavicular  Laterality: left  Injection technique: single-shot  Procedures: ultrasound guided, Ultrasound guidance required for the procedure to increase accuracy and safety of medication placement and decrease risk of complications  bupivacaine (PF) (MARCAINE) 0 25 % 10 mL - Perineural   25 mL - 6/23/2022 1:10:00 PM  Needle  Needle type: StimupBrevity   Needle gauge: 22 G  Needle length: 2 inch  Needle localization: ultrasound guidance  Assessment  Injection assessment: incremental injection, local visualized surrounding nerve on ultrasound, negative aspiration for heme and no paresthesia on injection  Paresthesia pain: none  Heart rate change: no  Slow fractionated injection: yes  Post-procedure:  site cleaned  patient tolerated the procedure well with no immediate complications  Additional Notes  Left sided supraclavicular single shot performed preoperatively under US guidance for postoperative pain control  A total of 25ml of 0 25% bupivacaine with 5mg of dexamethasone was injected  No complications were noted

## 2022-06-23 NOTE — PROGRESS NOTES
/103 - S   [place], anesthesia, aware and okay with patient being discharged and resume Norvasc once home tonight

## 2022-06-24 DIAGNOSIS — S62.102A CLOSED FRACTURE OF LEFT WRIST, INITIAL ENCOUNTER: Primary | ICD-10-CM

## 2022-06-27 ENCOUNTER — OFFICE VISIT (OUTPATIENT)
Dept: FAMILY MEDICINE CLINIC | Facility: CLINIC | Age: 52
End: 2022-06-27

## 2022-06-27 ENCOUNTER — HOSPITAL ENCOUNTER (EMERGENCY)
Facility: HOSPITAL | Age: 52
Discharge: HOME | End: 2022-06-28
Attending: EMERGENCY MEDICINE
Payer: MEDICARE

## 2022-06-27 ENCOUNTER — APPOINTMENT (EMERGENCY)
Dept: RADIOLOGY | Facility: HOSPITAL | Age: 52
End: 2022-06-27
Payer: MEDICARE

## 2022-06-27 VITALS
RESPIRATION RATE: 18 BRPM | DIASTOLIC BLOOD PRESSURE: 68 MMHG | HEART RATE: 70 BPM | OXYGEN SATURATION: 95 % | TEMPERATURE: 97.7 F | SYSTOLIC BLOOD PRESSURE: 104 MMHG

## 2022-06-27 DIAGNOSIS — G89.4 CHRONIC PAIN SYNDROME: ICD-10-CM

## 2022-06-27 DIAGNOSIS — I10 BENIGN ESSENTIAL HTN: ICD-10-CM

## 2022-06-27 DIAGNOSIS — M54.9 BACK PAIN: Primary | ICD-10-CM

## 2022-06-27 DIAGNOSIS — J45.30 MILD PERSISTENT ASTHMA WITHOUT COMPLICATION: Primary | ICD-10-CM

## 2022-06-27 DIAGNOSIS — M31.31 GRANULOMATOSIS WITH POLYANGIITIS WITH RENAL INVOLVEMENT (HCC): ICD-10-CM

## 2022-06-27 PROBLEM — G89.29 CHRONIC PAIN: Chronic | Status: ACTIVE | Noted: 2022-02-14

## 2022-06-27 PROCEDURE — 99283 EMERGENCY DEPT VISIT LOW MDM: CPT

## 2022-06-27 PROCEDURE — 99214 OFFICE O/P EST MOD 30 MIN: CPT | Performed by: PHYSICIAN ASSISTANT

## 2022-06-27 PROCEDURE — 99284 EMERGENCY DEPT VISIT MOD MDM: CPT | Performed by: EMERGENCY MEDICINE

## 2022-06-27 PROCEDURE — 72100 X-RAY EXAM L-S SPINE 2/3 VWS: CPT

## 2022-06-27 PROCEDURE — 3078F DIAST BP <80 MM HG: CPT | Performed by: PHYSICIAN ASSISTANT

## 2022-06-27 PROCEDURE — 3074F SYST BP LT 130 MM HG: CPT | Performed by: PHYSICIAN ASSISTANT

## 2022-06-27 RX ORDER — ALBUTEROL SULFATE 90 UG/1
2 AEROSOL, METERED RESPIRATORY (INHALATION) EVERY 6 HOURS PRN
Qty: 18 G | Refills: 1 | Status: SHIPPED | OUTPATIENT
Start: 2022-06-27

## 2022-06-27 RX ORDER — DIAZEPAM 5 MG/1
5 TABLET ORAL ONCE
Status: COMPLETED | OUTPATIENT
Start: 2022-06-27 | End: 2022-06-27

## 2022-06-27 RX ORDER — LIDOCAINE 50 MG/G
1 PATCH TOPICAL ONCE
Status: DISCONTINUED | OUTPATIENT
Start: 2022-06-27 | End: 2022-06-28 | Stop reason: HOSPADM

## 2022-06-27 RX ORDER — ALBUTEROL SULFATE 2.5 MG/3ML
2.5 SOLUTION RESPIRATORY (INHALATION) EVERY 6 HOURS PRN
Qty: 90 ML | Refills: 1 | Status: SHIPPED | OUTPATIENT
Start: 2022-06-27 | End: 2022-07-19

## 2022-06-27 RX ORDER — OXYCODONE HYDROCHLORIDE 15 MG/1
15 TABLET ORAL EVERY 4 HOURS PRN
Qty: 180 TABLET | Refills: 0 | Status: SHIPPED | OUTPATIENT
Start: 2022-06-27 | End: 2022-08-10 | Stop reason: SDUPTHER

## 2022-06-27 RX ORDER — BUDESONIDE AND FORMOTEROL FUMARATE DIHYDRATE 80; 4.5 UG/1; UG/1
2 AEROSOL RESPIRATORY (INHALATION) 2 TIMES DAILY
Qty: 10.2 G | Refills: 1 | Status: SHIPPED | OUTPATIENT
Start: 2022-06-27

## 2022-06-27 RX ADMIN — LIDOCAINE 1 PATCH: 50 PATCH CUTANEOUS at 23:56

## 2022-06-27 RX ADMIN — DIAZEPAM 5 MG: 5 TABLET ORAL at 23:56

## 2022-06-27 NOTE — ASSESSMENT & PLAN NOTE
- Patient believes Lyrica has been causing low blood pressure  Patient has not been taking amlodipine for the past 3 weeks and her blood pressure continues to be on the lower side  - Advised to continue to hold amlodipine for now  - Will continue to monitor

## 2022-06-27 NOTE — PROGRESS NOTES
Assessment/Plan:    Benign essential HTN  - Patient believes Lyrica has been causing low blood pressure  Patient has not been taking amlodipine for the past 3 weeks and her blood pressure continues to be on the lower side  - Advised to continue to hold amlodipine for now  - Will continue to monitor  Asthma  - Will start Symbicort twice daily  - Continue albuterol inhaler as needed and nebulizer as needed  Chronic pain  - Recently, current dose of oxycodone 10 mg, every 4 hours has not been effective in controlling patient's pain  - Will increase dose to oxycodone 15 mg, every 4 hours as needed  PDMP reviewed  No red flags noted  - UDS up to date  - Continue Lyrica 100 mg 3 times daily as prescribed through pain management  - Continue follow-up with pain management as scheduled  Diagnoses and all orders for this visit:    Mild persistent asthma without complication  -     albuterol (Ventolin HFA) 90 mcg/act inhaler; Inhale 2 puffs every 6 (six) hours as needed for wheezing or shortness of breath  -     albuterol (2 5 mg/3 mL) 0 083 % nebulizer solution; Take 3 mL (2 5 mg total) by nebulization every 6 (six) hours as needed for wheezing or shortness of breath  -     budesonide-formoterol (Symbicort) 80-4 5 MCG/ACT inhaler; Inhale 2 puffs 2 (two) times a day Rinse mouth after use  Granulomatosis with polyangiitis with renal involvement (HCC)  -     oxyCODONE (Roxicodone) 15 mg immediate release tablet; Take 1 tablet (15 mg total) by mouth every 4 (four) hours as needed for moderate pain Max Daily Amount: 90 mg    Chronic pain syndrome  -     oxyCODONE (Roxicodone) 15 mg immediate release tablet; Take 1 tablet (15 mg total) by mouth every 4 (four) hours as needed for moderate pain Max Daily Amount: 90 mg    Benign essential HTN          All of patients questions were answered  Patient understands and agrees with the above plan       Return in about 4 weeks (around 7/25/2022) for Next scheduled follow up pain, blood pressure  Micah Fonseca PA-C  06/27/22  [address] [facility] [place]          Subjective:     Patient ID: Bhupinder Nicholson  is a 46 y o  female with known PHM of gastroparesis, GERD, constipation, ADHD, type 2 diabetes mellitus, cataplexy, Wegener's granulomatosis, small fiber neuropathy, bipolar 1 disorder who presents today in office for chronic pain follow up  - Patient is a 46 y o  female who presents today for chronic pain follow up  Patient did undergo left wrist surgery on 06/23/2022 due to left wrist fracture  Patient notes since the surgery, she has been experiencing increased pain, specifically of her left shoulder, left arm, left lower back  Patient notes she did receive injections of her back a few days ago, but the pain has been persistent  Patient notes the rainy weather today is also not helping with her pain  Patient notes the Lyrica has now started to help with her nerve pain  Patient notes she did stop the medication for a bit because it was causing dry mouth and low blood pressure  However, her nerve pain worsened so now she has restarted the medication  In the meantime, patient notes she is not taking amlodipine because her blood pressure has been low  However, patient notes oxycodone is not as effective as it previously was  Patient notes usually when she takes the medication, it will decrease her pain 5/10 level  Recently, it has been decreasing her pain to 7-8/10     - Also, patient notes she was receiving Symbicort when she was hospitalized for COVID-19 and that inhaler was more effective  Patient would like a prescription for this  The following portions of the patient's history were reviewed and updated as appropriate: allergies, current medications, past family history, past medical history, past social history, past surgical history and problem list         Review of Systems   Constitutional: Negative for chills and fever     HENT: Negative for congestion and sore throat  Respiratory: Negative for cough, chest tightness and shortness of breath  Cardiovascular: Negative for chest pain and leg swelling  Gastrointestinal: Negative for abdominal pain, constipation, diarrhea and vomiting  Genitourinary: Negative for dysuria  Musculoskeletal: Positive for arthralgias, back pain, myalgias and neck pain  Neurological: Positive for numbness and headaches  Negative for dizziness  Psychiatric/Behavioral: Negative for behavioral problems, self-injury and suicidal ideas  Objective:   Vitals:    06/27/22 1059   BP: 104/68   BP Location: Right arm   Patient Position: Sitting   Cuff Size: Standard   Pulse: 70   Resp: 18   Temp: 97 7 °F (36 5 °C)   TempSrc: Temporal   SpO2: 95%         Physical Exam  Vitals and nursing note reviewed  Constitutional:       General: She is not in acute distress  Appearance: She is well-developed  Comments: Examined in wheelchair   HENT:      Head: Normocephalic and atraumatic  Right Ear: External ear normal       Left Ear: External ear normal       Nose: Nose normal    Eyes:      Conjunctiva/sclera: Conjunctivae normal    Cardiovascular:      Rate and Rhythm: Normal rate and regular rhythm  Pulses: Normal pulses  Heart sounds: Normal heart sounds  Pulmonary:      Effort: Pulmonary effort is normal  No respiratory distress  Breath sounds: Normal breath sounds  No wheezing  Musculoskeletal:      Right wrist: Tenderness present  No swelling  Decreased range of motion  Cervical back: Normal range of motion and neck supple  Comments: Left wrist incision clean, dry, intact  Skin:     General: Skin is warm and dry  Neurological:      Mental Status: She is alert and oriented to person, place, and time     Psychiatric:         Behavior: Behavior normal

## 2022-06-27 NOTE — LETTER
June 27, 2022     Patient: Amanda Henriquez  YOB: 1970  Date of Visit: 6/27/2022      To Whom it May Concern:    Amanda Henriquez is under my professional care  Maximilian Vargas was seen in my office on 6/27/2022  Patient has medical conditions and is wheel chair bound  Patient's current housing is handicapped accessible  Patient does have a home health attendant  If you have any questions or concerns, please don't hesitate to call           Sincerely,          Katarzyna Thrasher PA-C        CC: No Recipients

## 2022-06-27 NOTE — ASSESSMENT & PLAN NOTE
- Recently, current dose of oxycodone 10 mg, every 4 hours has not been effective in controlling patient's pain  - Will increase dose to oxycodone 15 mg, every 4 hours as needed  PDMP reviewed  No red flags noted  - UDS up to date  - Continue Lyrica 100 mg 3 times daily as prescribed through pain management  - Continue follow-up with pain management as scheduled

## 2022-06-28 VITALS
TEMPERATURE: 98.7 F | HEART RATE: 60 BPM | RESPIRATION RATE: 16 BRPM | DIASTOLIC BLOOD PRESSURE: 75 MMHG | BODY MASS INDEX: 25.76 KG/M2 | SYSTOLIC BLOOD PRESSURE: 131 MMHG | HEIGHT: 62 IN | OXYGEN SATURATION: 98 % | WEIGHT: 140 LBS

## 2022-06-28 DIAGNOSIS — R21 RASH: ICD-10-CM

## 2022-06-28 RX ORDER — METHOCARBAMOL 500 MG/1
500 TABLET, FILM COATED ORAL 2 TIMES DAILY
Qty: 20 TABLET | Refills: 0 | Status: SHIPPED | OUTPATIENT
Start: 2022-06-28 | End: 2022-10-05 | Stop reason: ALTCHOICE

## 2022-06-28 RX ORDER — LIDOCAINE 40 MG/G
CREAM TOPICAL AS NEEDED
Qty: 30 G | Refills: 0 | Status: SHIPPED | OUTPATIENT
Start: 2022-06-28

## 2022-06-28 NOTE — ED NOTES
Patient requesting to use bathroom, agreeable to have purwick placed   Purwick placed at this time, patient knows to ring call bell for any needed adjustment or removal      Felix Huffman  06/28/22 [id number]

## 2022-06-28 NOTE — ED NOTES
Per pt, unable to be picked up by aide until 0700  Contacted [facility] for wheelchair transport   Pt on waitlist       Magdaleno Whitaker RN  06/28/22 [id number]

## 2022-06-28 NOTE — DISCHARGE INSTRUCTIONS
You can use the lidocaine cream over the painful area, this is a numbing agent  The Voltaren is a topical anti-inflammatory, this can also be applied over the back  The Robaxin is a muscle relaxer  An order for [facility] has been placed, they should be in contact with you  If you have not heard from them in 48 hours, call the number included in these discharge instructions

## 2022-06-28 NOTE — ED PROVIDER NOTES
History  Chief Complaint   Patient presents with    Back Pain     Ptby ems from home c/o back pain  Pt wheelchair dependent at baseline and has been having back pain since 6am that occurred after she tried to move her leg with an OT band  No fall or traumatic injury      45 YO female presents with Left lower back pain beginning this morning  Pt states she was using a resistance band around the Left leg for stretching exercises when she developed pain in the low back radiating to the posterior leg  Pain has been constant since onset, worse with movement of the back and bearing weight on the Left leg  Pt does use a wheelchair for assistance at home  Pt denies CP/SOB/F/C/N/V/D/C, no dysuria, burning on urination or blood in urine  History provided by:  Patient and medical records   used: No    Back Pain  Location:  Lumbar spine  Quality:  Aching  Radiates to:  L posterior upper leg  Pain severity:  Moderate  Onset quality:  Sudden  Duration:  12 hours  Timing:  Constant  Progression:  Unchanged  Chronicity:  Recurrent  Context: physical stress    Relieved by:  Nothing  Worsened by: Movement  Ineffective treatments:  Narcotics  Associated symptoms: no abdominal pain, no chest pain, no dysuria, no fever and no weakness        Prior to Admission Medications   Prescriptions Last Dose Informant Patient Reported? Taking?    Alcohol Swabs (Alcohol Pads) 70 % PADS   No No   Sig: Use 4 (four) times a day   Blood Glucose Monitoring Suppl (FreeStyle Lite) DEIRDRE   No No   Sig: Use daily   Diclofenac Sodium (VOLTAREN) 1 %   No No   Sig: Apply 2 g topically 4 (four) times a day   Easy Comfort Lancets MISC   No No   Sig: USE TO TEST THE BLOOD SUGAR THREE TIMES A DAY   Insulin Pen Needle (Pen Needles) 31G X 8 MM MISC   No No   Sig: Inject under the skin 4 (four) times a day (with meals and at bedtime)   Linzess 72 MCG CAPS   No No   Sig: TAKE ONE CAPSULE BY MOUTH ONCE DAILY   NON FORMULARY   Yes No   Sig: Medical [facility]   Restasis 0 05 % ophthalmic emulsion   Yes No   acetaminophen (TYLENOL) 500 mg tablet   No No   Sig: Take 2 tablets (1,000 mg total) by mouth every 8 (eight) hours as needed for mild pain   albuterol (2 5 mg/3 mL) 0 083 % nebulizer solution   No No   Sig: Take 3 mL (2 5 mg total) by nebulization every 6 (six) hours as needed for wheezing or shortness of breath   albuterol (Ventolin HFA) 90 mcg/act inhaler   No No   Sig: Inhale 2 puffs every 6 (six) hours as needed for wheezing or shortness of breath   amLODIPine (NORVASC) 5 mg tablet   No No   Sig: TAKE ONE TABLET BY MOUTH ONCE DAILY   amphetamine-dextroamphetamine (ADDERALL XR) 20 MG 24 hr capsule   No No   Sig: Take 1 capsule (20 mg total) by mouth 2 (two) times a day Max Daily Amount: 40 mg   buPROPion (WELLBUTRIN XL) 300 mg 24 hr tablet   No No   Sig: Take 1 tablet (300 mg total) by mouth every morning   budesonide-formoterol (Symbicort) 80-4 5 MCG/ACT inhaler   No No   Sig: Inhale 2 puffs 2 (two) times a day Rinse mouth after use  clobetasol (TEMOVATE) 0 05 % cream   No No   Sig: APPLY TOPICALLY TO AFFECTED AREA TWICE A DAY   dicyclomine (BENTYL) 20 mg tablet   No No   Sig: Take 1 tablet (20 mg total) by mouth in the morning and 1 tablet (20 mg total) in the evening  Do all this for 7 days     glucose blood (FREESTYLE LITE) test strip   No No   Sig: Use as instructed   insulin aspart (NovoLOG FlexPen) 100 UNIT/ML injection pen   No No   Sig: Inject 6 Units under the skin 3 (three) times a day with meals   insulin glargine (Toujeo Max SoloStar) 300 units/mL CONCENTRATED U-300 injection pen (2-unit dial)   No No   Sig: Inject 22 Units under the skin daily   Patient taking differently: Inject 22 Units under the skin daily at bedtime   lidocaine (XYLOCAINE) 5 % ointment   No No   Sig: APPLY TOPICALLY 2GM TWICE A DAY TO AFFECTED AREAS AS NEEDED FOR MILD PAIN   Patient taking differently: Lidocaine patches   ondansetron (ZOFRAN) 8 mg tablet   No No Sig: Take 1 tablet (8 mg total) by mouth every 8 (eight) hours as needed for nausea or vomiting   oxyCODONE (Roxicodone) 15 mg immediate release tablet   No No   Sig: Take 1 tablet (15 mg total) by mouth every 4 (four) hours as needed for moderate pain Max Daily Amount: 90 mg   pantoprazole (PROTONIX) 40 mg tablet   No No   Sig: Take 1 tablet (40 mg total) by mouth 2 (two) times a day before meals   pregabalin (LYRICA) 100 mg capsule   No No   Sig: Take 1 capsule (100 mg total) by mouth 3 (three) times a day   promethazine (PHENERGAN) 25 mg tablet   No No   Sig: Take 1 tablet (25 mg total) by mouth every 6 (six) hours as needed for nausea or vomiting   scopolamine (TRANSDERM-SCOP) 1 5 mg/3 days TD 72 hr patch   No No   Sig: Place 1 patch on the skin every third day   sucralfate (CARAFATE) 1 g/10 mL suspension   No No   Sig: TAKE 10ML BY MOUTH THREE TIMES A DAY   sucralfate (CARAFATE) 1 g/10 mL suspension   No No   Sig: Take 10 mL (1 g total) by mouth in the morning and 10 mL (1 g total) at noon and 10 mL (1 g total) in the evening and 10 mL (1 g total) before bedtime  Do all this for 7 days  Facility-Administered Medications: None       Past Medical History:   Diagnosis Date    ADHD     Anemia of chronic disease     Anxiety     Arthritis ?  Asthma     Bipolar disorder ([facility] )     Borderline personality disorder ([facility] )     Cataplexy     Chronic abdominal pain     Chronic kidney disease ?  CKD (chronic kidney disease) stage 3, GFR 30-59 ml/min (HCC)     Cushing syndrome (HCC)     Depression ?     Diabetes mellitus ([facility] )     DVT (deep venous thrombosis) (HCC)     GERD (gastroesophageal reflux disease)     Headache(784 0) 3 months    History of acute pancreatitis     felt secondary to Bactrim    History of transfusion     Hypertension     Liver disease     fatty liver    Microscopic polyangiitis (HCC)     Ovarian cyst     PTSD (post-traumatic stress disorder)     Self-inflicted injury self inflicted skin wounds    Sleep apnea     Wegener's granulomatosis with renal involvement ([facility] )        Past Surgical History:   Procedure Laterality Date    ESOPHAGOGASTRODUODENOSCOPY  2015    mild antral gastritis    GASTRIC STIMULATOR IMPLANT SURGERY  2020    OH COLONOSCOPY FLX DX W/COLLJ SPEC WHEN PFRMD N/A 2018    adenoma removed from the transverse, hyperplastic polyp removed from the left colon    OH ESOPHAGOGASTRODUODENOSCOPY TRANSORAL DIAGNOSTIC N/A 2018    gastritis and scant coffee-ground material   Biopsies negative for H  pylori    OH OPEN TX RADIAL & ULNAR SHAFT FX FIX RADIUS AND ULNA Left 2022    Procedure: OPEN REDUCTION W/ INTERNAL FIXATION (ORIF) RADIUS / ULNA (WRIST); Surgeon: Addis Braga MD;  Location: [facility];  Service: Orthopedics    RELEASE SCAR CONTRACTURE / GRAFT REPAIRS OF HAND Bilateral     UPPER GASTROINTESTINAL ENDOSCOPY  2019    Dr Cornelia Mcfadden  Botox to the pylorus       Family History   Problem Relation Age of Onset    Arthritis Mother     Depression Mother     Diabetes Mother     Mental illness Mother    [employer] Mother     No Known Problems Father     Colon cancer Neg Hx     Drug abuse Neg Hx         mother father    Mental illness Neg Hx         disorder, mother father    Cancer Neg Hx     Breast cancer Neg Hx      I have reviewed and agree with the history as documented      E-Cigarette/Vaping    E-Cigarette Use Never User      E-Cigarette/Vaping Substances    Nicotine No     THC No     CBD No     Flavoring No     Other No     Unknown No      Social History     Tobacco Use    Smoking status: Former Smoker     Packs/day: 1 00     Years: 10 00     Pack years: 10 00     Types: Cigarettes     Quit date: 2011     Years since quittin 4    Smokeless tobacco: Never Used    Tobacco comment: Stopped smoking 11 years ago   Vaping Use    Vaping Use: Never used   Substance Use Topics    Alcohol use: Never    Drug use: Yes     Types: Marijuana     Comment: marijuana daily       Review of Systems   Constitutional: Negative for chills, fatigue and fever  HENT: Negative for dental problem  Eyes: Negative for visual disturbance  Respiratory: Negative for shortness of breath  Cardiovascular: Negative for chest pain  Gastrointestinal: Negative for abdominal pain, diarrhea and vomiting  Genitourinary: Negative for dysuria and frequency  Musculoskeletal: Positive for back pain  Negative for arthralgias  Skin: Negative for rash  Neurological: Negative for dizziness, weakness and light-headedness  Psychiatric/Behavioral: Negative for agitation, behavioral problems and confusion  All other systems reviewed and are negative  Physical Exam  Physical Exam  Vitals and nursing note reviewed  Constitutional:       Appearance: Normal appearance  HENT:      Head: Normocephalic and atraumatic  Eyes:      Extraocular Movements: Extraocular movements intact  Conjunctiva/sclera: Conjunctivae normal    Cardiovascular:      Rate and Rhythm: Normal rate  Pulmonary:      Effort: Pulmonary effort is normal    Abdominal:      General: There is no distension  Musculoskeletal:         General: Normal range of motion  Cervical back: Normal range of motion  Comments: Tender in the Left lower back, paraspinal    Skin:     Findings: No rash  Neurological:      General: No focal deficit present  Mental Status: She is alert  Cranial Nerves: No cranial nerve deficit     Psychiatric:         Mood and Affect: Mood normal          Vital Signs  ED Triage Vitals [06/27/22 2249]   Temperature Pulse Respirations Blood Pressure SpO2   98 7 °F (37 1 °C) 67 18 130/70 98 %      Temp Source Heart Rate Source Patient Position - Orthostatic VS BP Location FiO2 (%)   Oral Monitor Lying Right arm --      Pain Score       9           Vitals:    06/27/22 2249 06/28/22 0237 06/28/22 0629   BP: 130/70 135/74 131/75   Pulse: 67 61 60   Patient Position - Orthostatic VS: Lying Lying Lying         Visual Acuity      ED Medications  Medications   diazepam (VALIUM) tablet 5 mg (5 mg Oral Given 6/27/22 0563)       Diagnostic Studies  Results Reviewed     None                 XR lumbar spine 2 or 3 views   ED Interpretation by Adriana Paniagua MD (06/27 3060)   No Fx      Final Result by Craig Bernabe MD (06/28 1230)      Limited examination as described above  No acute bone abnormality identified  Multilevel degenerative changes particularly involving the posterior facet joints         Workstation performed: [id number]                    Procedures  Procedures         ED Course  ED Course as of 06/28/22 1413   Tue Jun 28, 2022   0022 Discussed medications for back pain, to use topical NSAIDs and lidocaine as well as muscle relaxer  Additionally will send ambulatory referral to [facility] which Pt is accepting  She states ok to go home, however she will need to use her wheelchair and she received Valium in the ED, requesting a couple hours to rest prior to discharge  MDM  Number of Diagnoses or Management Options  Back pain: new and requires workup  Diagnosis management comments: 1  Back pain - Pt with paraspinal back pain with stretching, no midline tenderness  Pt is wheelchair bound  Will obtain x-ray, treat with Valium and lidocaine   Will refer to [facility]        Amount and/or Complexity of Data Reviewed  Tests in the radiology section of CPT®: ordered and reviewed  Review and summarize past medical records: yes  Independent visualization of images, tracings, or specimens: yes    Patient Progress  Patient progress: improved      Disposition  Final diagnoses:   Back pain     Time reflects when diagnosis was documented in both MDM as applicable and the Disposition within this note     Time User Action Codes Description Comment    6/28/2022 12:38 AM Helen Paul Add [M54 9] Back pain       ED Disposition     ED Disposition   Discharge    Condition   Stable    Date/Time   Tue Jun 28, 2022 12:38 AM    Comment   Juan Antonio Barbosa discharge to home/self care  Follow-up Information     Follow up With Specialties Details Why Contact Info Additional Information    [facility] Physical Therapy Schedule an appointment as soon as possible for a visit   [phone]          Discharge Medication List as of 6/28/2022 12:41 AM      START taking these medications    Details   !! Diclofenac Sodium (VOLTAREN) 1 % Apply 2 g topically 4 (four) times a day, Starting Tue 6/28/2022, Normal      lidocaine (LMX) 4 % cream Apply topically as needed for mild pain, Starting Tue 6/28/2022, Normal      methocarbamol (ROBAXIN) 500 mg tablet Take 1 tablet (500 mg total) by mouth 2 (two) times a day, Starting Tue 6/28/2022, Normal       !! - Potential duplicate medications found  Please discuss with provider        CONTINUE these medications which have NOT CHANGED    Details   acetaminophen (TYLENOL) 500 mg tablet Take 2 tablets (1,000 mg total) by mouth every 8 (eight) hours as needed for mild pain, Starting Mon 2/28/2022, Normal      albuterol (2 5 mg/3 mL) 0 083 % nebulizer solution Take 3 mL (2 5 mg total) by nebulization every 6 (six) hours as needed for wheezing or shortness of breath, Starting Mon 6/27/2022, Normal      albuterol (Ventolin HFA) 90 mcg/act inhaler Inhale 2 puffs every 6 (six) hours as needed for wheezing or shortness of breath, Starting Mon 6/27/2022, Normal      Alcohol Swabs (Alcohol Pads) 70 % PADS Use 4 (four) times a day, Starting Wed 12/1/2021, Normal      amLODIPine (NORVASC) 5 mg tablet TAKE ONE TABLET BY MOUTH ONCE DAILY, Normal      amphetamine-dextroamphetamine (ADDERALL XR) 20 MG 24 hr capsule Take 1 capsule (20 mg total) by mouth 2 (two) times a day Max Daily Amount: 40 mg, Starting Mon 6/13/2022, Normal Blood Glucose Monitoring Suppl (FreeStyle Lite) DEIRDRE Use daily, Starting Fri 7/23/2021, Normal      budesonide-formoterol (Symbicort) 80-4 5 MCG/ACT inhaler Inhale 2 puffs 2 (two) times a day Rinse mouth after use , Starting Mon 6/27/2022, Normal      buPROPion (WELLBUTRIN XL) 300 mg 24 hr tablet Take 1 tablet (300 mg total) by mouth every morning, Starting Tue 1/18/2022, Normal      clobetasol (TEMOVATE) 0 05 % cream APPLY TOPICALLY TO AFFECTED AREA TWICE A DAY, Normal      !! Diclofenac Sodium (VOLTAREN) 1 % Apply 2 g topically 4 (four) times a day, Starting Wed 4/6/2022, Normal      dicyclomine (BENTYL) 20 mg tablet Take 1 tablet (20 mg total) by mouth in the morning and 1 tablet (20 mg total) in the evening  Do all this for 7 days  , Starting Thu 5/12/2022, Until Wed 6/15/2022, Normal      Easy Comfort Lancets MISC USE TO TEST THE BLOOD SUGAR THREE TIMES A DAY, Normal      glucose blood (FREESTYLE LITE) test strip Use as instructed, Normal      insulin aspart (NovoLOG FlexPen) 100 UNIT/ML injection pen Inject 6 Units under the skin 3 (three) times a day with meals, Starting Mon 3/7/2022, Normal      insulin glargine (Toujeo Max SoloStar) 300 units/mL CONCENTRATED U-300 injection pen (2-unit dial) Inject 22 Units under the skin daily, Starting Mon 3/7/2022, Normal      Insulin Pen Needle (Pen Needles) 31G X 8 MM MISC Inject under the skin 4 (four) times a day (with meals and at bedtime), Starting Tue 3/1/2022, Normal      lidocaine (XYLOCAINE) 5 % ointment APPLY TOPICALLY 2GM TWICE A DAY TO AFFECTED AREAS AS NEEDED FOR MILD PAIN, Normal      Linzess 72 MCG CAPS TAKE ONE CAPSULE BY MOUTH ONCE DAILY, Normal      NON FORMULARY Medical marjuana, Historical Med      ondansetron (ZOFRAN) 8 mg tablet Take 1 tablet (8 mg total) by mouth every 8 (eight) hours as needed for nausea or vomiting, Starting Tue 3/29/2022, Normal      oxyCODONE (Roxicodone) 15 mg immediate release tablet Take 1 tablet (15 mg total) by mouth every 4 (four) hours as needed for moderate pain Max Daily Amount: 90 mg, Starting Mon 6/27/2022, Normal      pantoprazole (PROTONIX) 40 mg tablet Take 1 tablet (40 mg total) by mouth 2 (two) times a day before meals, Starting Tue 1/18/2022, Normal      pregabalin (LYRICA) 100 mg capsule Take 1 capsule (100 mg total) by mouth 3 (three) times a day, Starting Tue 5/3/2022, Normal      promethazine (PHENERGAN) 25 mg tablet Take 1 tablet (25 mg total) by mouth every 6 (six) hours as needed for nausea or vomiting, Starting Mon 5/10/2021, Normal      Restasis 0 05 % ophthalmic emulsion Starting Wed 12/29/2021, Historical Med      scopolamine (TRANSDERM-SCOP) 1 5 mg/3 days TD 72 hr patch Place 1 patch on the skin every third day, Starting Tue 4/27/2021, Normal      sucralfate (CARAFATE) 1 g/10 mL suspension TAKE 10ML BY MOUTH THREE TIMES A DAY, Normal       !! - Potential duplicate medications found  Please discuss with provider                PDMP Review       Value Time User    PDMP Reviewed  Yes 6/27/2022 11:29 AM Evelyn Ge PA-C          ED Provider  Electronically Signed by           Kofi Samaniego MD  06/28/22 9626

## 2022-06-29 ENCOUNTER — APPOINTMENT (OUTPATIENT)
Dept: RADIOLOGY | Facility: MEDICAL CENTER | Age: 52
End: 2022-06-29
Payer: MEDICARE

## 2022-06-29 ENCOUNTER — TELEPHONE (OUTPATIENT)
Dept: PHYSICAL THERAPY | Facility: OTHER | Age: 52
End: 2022-06-29

## 2022-06-29 ENCOUNTER — TELEPHONE (OUTPATIENT)
Dept: PAIN MEDICINE | Facility: CLINIC | Age: 52
End: 2022-06-29

## 2022-06-29 ENCOUNTER — OFFICE VISIT (OUTPATIENT)
Dept: OBGYN CLINIC | Facility: MEDICAL CENTER | Age: 52
End: 2022-06-29
Payer: MEDICARE

## 2022-06-29 VITALS
BODY MASS INDEX: 25.76 KG/M2 | HEART RATE: 62 BPM | WEIGHT: 140 LBS | SYSTOLIC BLOOD PRESSURE: 117 MMHG | HEIGHT: 62 IN | DIASTOLIC BLOOD PRESSURE: 72 MMHG

## 2022-06-29 DIAGNOSIS — S69.91XA RIGHT WRIST INJURY, INITIAL ENCOUNTER: ICD-10-CM

## 2022-06-29 DIAGNOSIS — M25.531 PAIN IN RIGHT WRIST: Primary | ICD-10-CM

## 2022-06-29 DIAGNOSIS — R20.0 FINGER NUMBNESS: ICD-10-CM

## 2022-06-29 DIAGNOSIS — M25.531 PAIN IN RIGHT WRIST: ICD-10-CM

## 2022-06-29 DIAGNOSIS — M54.12 RADICULOPATHY, CERVICAL REGION: ICD-10-CM

## 2022-06-29 PROCEDURE — 73110 X-RAY EXAM OF WRIST: CPT

## 2022-06-29 PROCEDURE — 99213 OFFICE O/P EST LOW 20 MIN: CPT | Performed by: EMERGENCY MEDICINE

## 2022-06-29 NOTE — TELEPHONE ENCOUNTER
Nurse reached out to discuss recent referral entered for  [facility] program and offerings  Nurse identified self and reason for call  Patient stated this was not a good time to discuss/talk  Nurse encouraged her to [facility] when she can to discuss the program  Agreed  Nurse confirmed patient has [facility] contact information and encouraged to call program back if needed when able/available  Patient agreed and very appreciative of call  Nurse wished her well and referral closed per protocol   Will await CB from patient

## 2022-06-29 NOTE — TELEPHONE ENCOUNTER
Pt reports 0% improvement post inj  Pain level 9/10    Patient is aware I will call her back next week to see how she's doing

## 2022-06-29 NOTE — PROGRESS NOTES
Assessment/Plan:    Diagnoses and all orders for this visit:    Pain in right wrist  -     XR wrist 3+ vw right; Future  -     Cock Up Wrist Splint    Right wrist injury, initial encounter  -     XR wrist 3+ vw right; Future  -     Cock Up Wrist Splint    Finger numbness  -     EMG 1 Limb; Future    Radiculopathy, cervical region  -     EMG 1 Limb; Future    F/U with Dr Tucker for Left wrist post op check and recheck on right wrist contusion  Wrist brace right wrist   EMG for chronic right hand / middle finger n/t  F/U with Pain Management    Return if symptoms worsen or fail to improve  Chief Complaint:   No chief complaint on file  Subjective:   Patient ID: Jim Badillo is a 46 y o  female  Patient new to me presents for 2 issues:     Patient complains of chronic numbness of the right 3rd digit mostly posterior aspect she was originally referred to and evaluated in physical therapy who believe that this could potentially be coming from the cervical spine she does treat with pain management for her neck and back  Patient complains of right wrist pain occurring several weeks ago after she hit a door with the back of her right hand and wrist which also resulted in a left wrist fracture and subsequent ORIF  She states that her right wrist pain was never evaluated  Review of Systems    The following portions of the patient's chart were reviewed and updated as appropriate:    Allergy:    Allergies   Allergen Reactions    Prozac [Fluoxetine Hcl]      SI    Bactrim [Sulfamethoxazole-Trimethoprim]      Pt "They think that is what cause the pancreatitis"     Flagyl [Metronidazole] Diarrhea and Abdominal Pain    Lamictal [Lamotrigine] GI Intolerance    Lithium Other (See Comments)    Haldol [Haloperidol] Other (See Comments)     "I don't like it"    Ibuprofen     Lexapro [Escitalopram Oxalate] Rash    Navane [Thiothixene]      SI    Other      "novaine?" antipsychotic         Past Medical History:   Diagnosis Date    ADHD     Anemia of chronic disease     Anxiety     Arthritis ?  Asthma     Bipolar disorder ([facility] )     Borderline personality disorder ([place] [address] )     Cataplexy     Chronic abdominal pain     Chronic kidney disease ?  CKD (chronic kidney disease) stage 3, GFR 30-59 ml/min (HCC)     Cushing syndrome (HCC)     Depression ?  Diabetes mellitus ([facility] )     DVT (deep venous thrombosis) (HCC)     GERD (gastroesophageal reflux disease)     Headache(784 0) 3 months    History of acute pancreatitis     felt secondary to Bactrim    History of transfusion     Hypertension     Liver disease     fatty liver    Microscopic polyangiitis (HCC)     Ovarian cyst     PTSD (post-traumatic stress disorder)     Self-inflicted injury     self inflicted skin wounds    Sleep apnea     Wegener's granulomatosis with renal involvement ([place] [address] ) 2015       Past Surgical History:   Procedure Laterality Date    ESOPHAGOGASTRODUODENOSCOPY  09/11/2015    mild antral gastritis    GASTRIC STIMULATOR IMPLANT SURGERY  06/25/2020    OK COLONOSCOPY FLX DX W/COLLJ SPEC WHEN PFRMD N/A 12/14/2018    adenoma removed from the transverse, hyperplastic polyp removed from the left colon    OK ESOPHAGOGASTRODUODENOSCOPY TRANSORAL DIAGNOSTIC N/A 12/14/2018    gastritis and scant coffee-ground material   Biopsies negative for H  pylori    OK OPEN TX RADIAL & ULNAR SHAFT FX FIX RADIUS AND ULNA Left 6/23/2022    Procedure: OPEN REDUCTION W/ INTERNAL FIXATION (ORIF) RADIUS / ULNA (WRIST); Surgeon: Gerri Huang MD;  Location: [facility];  Service: Orthopedics    RELEASE SCAR CONTRACTURE / GRAFT REPAIRS OF HAND Bilateral     UPPER GASTROINTESTINAL ENDOSCOPY  12/26/2019    Dr Max Alt   Botox to the pylorus       Social History     Socioeconomic History    Marital status: Single     Spouse name: Not on file    Number of children: Not on file    Years of education: Not on file    Highest education level: Not on file Occupational History    Occupation: disability   Tobacco Use    Smoking status: Former Smoker     Packs/day: 1 00     Years: 10 00     Pack years: 10 00     Types: Cigarettes     Quit date: 2011     Years since quittin 4    Smokeless tobacco: Never Used    Tobacco comment: Stopped smoking 11 years ago   Vaping Use    Vaping Use: Never used   Substance and Sexual Activity    Alcohol use: Never    Drug use: Yes     Types: Marijuana     Comment: marijuana daily    Sexual activity: Not Currently     Partners: Male     Birth control/protection: None   Other Topics Concern    Not on file   Social History Narrative    Social hx reviewed     No pref on [employer] beliefs     Daily caffeine consumption 1 serving/day     Social Determinants of Health     Financial Resource Strain: Low Risk     Difficulty of Paying Living Expenses: Not very hard   Food Insecurity: No Food Insecurity    Worried About Running Out of Food in the Last Year: Never true    [place] of Food in the Last Year: Never true   Transportation Needs: No Transportation Needs    Lack of Transportation (Medical): No    Lack of Transportation (Non-Medical):  No   Physical Activity: Not on file   Stress: Not on file   Social Connections: Not on file   Intimate Partner Violence: Not on file   Housing Stability: Not on file       Family History   Problem Relation Age of Onset    Arthritis Mother     Depression Mother     Diabetes Mother     Mental illness Mother    Dionne Whitfield Migraines Mother     No Known Problems Father     Colon cancer Neg Hx     Drug abuse Neg Hx         mother father    Mental illness Neg Hx         disorder, mother father    Cancer Neg Hx     Breast cancer Neg Hx        Medications:    Current Outpatient Medications:     acetaminophen (TYLENOL) 500 mg tablet, Take 2 tablets (1,000 mg total) by mouth every 8 (eight) hours as needed for mild pain, Disp: 60 tablet, Rfl: 1    albuterol (2 5 mg/3 mL) 0 083 % nebulizer solution, Take 3 mL (2 5 mg total) by nebulization every 6 (six) hours as needed for wheezing or shortness of breath, Disp: 90 mL, Rfl: 1    albuterol (Ventolin HFA) 90 mcg/act inhaler, Inhale 2 puffs every 6 (six) hours as needed for wheezing or shortness of breath, Disp: 18 g, Rfl: 1    Alcohol Swabs (Alcohol Pads) 70 % PADS, Use 4 (four) times a day, Disp: 400 each, Rfl: 2    amLODIPine (NORVASC) 5 mg tablet, TAKE ONE TABLET BY MOUTH ONCE DAILY, Disp: 90 tablet, Rfl: 1    amphetamine-dextroamphetamine (ADDERALL XR) 20 MG 24 hr capsule, Take 1 capsule (20 mg total) by mouth 2 (two) times a day Max Daily Amount: 40 mg, Disp: 60 capsule, Rfl: 0    Blood Glucose Monitoring Suppl (FreeStyle Lite) DEIRDRE, Use daily, Disp: 1 each, Rfl: 0    budesonide-formoterol (Symbicort) 80-4 5 MCG/ACT inhaler, Inhale 2 puffs 2 (two) times a day Rinse mouth after use , Disp: 10 2 g, Rfl: 1    buPROPion (WELLBUTRIN XL) 300 mg 24 hr tablet, Take 1 tablet (300 mg total) by mouth every morning, Disp: 30 tablet, Rfl: 2    clobetasol (TEMOVATE) 0 05 % cream, APPLY TOPICALLY TO AFFECTED AREA TWICE A DAY, Disp: 60 g, Rfl: 0    Diclofenac Sodium (VOLTAREN) 1 %, Apply 2 g topically 4 (four) times a day, Disp: 100 g, Rfl: 0    Diclofenac Sodium (VOLTAREN) 1 %, Apply 2 g topically 4 (four) times a day, Disp: 50 g, Rfl: 0    dicyclomine (BENTYL) 20 mg tablet, Take 1 tablet (20 mg total) by mouth in the morning and 1 tablet (20 mg total) in the evening  Do all this for 7 days  , Disp: 20 tablet, Rfl: 0    Easy Comfort Lancets MISC, USE TO TEST THE BLOOD SUGAR THREE TIMES A DAY, Disp: 300 each, Rfl: 10    glucose blood (FREESTYLE LITE) test strip, Use as instructed, Disp: 100 strip, Rfl: 0    insulin aspart (NovoLOG FlexPen) 100 UNIT/ML injection pen, Inject 6 Units under the skin 3 (three) times a day with meals, Disp: 15 mL, Rfl: 0    insulin glargine (Toujeo Max SoloStar) 300 units/mL CONCENTRATED U-300 injection pen (2-unit dial), Inject 22 Units under the skin daily (Patient taking differently: Inject 22 Units under the skin daily at bedtime), Disp: 9 mL, Rfl: 0    Insulin Pen Needle (Pen Needles) 31G X 8 MM MISC, Inject under the skin 4 (four) times a day (with meals and at bedtime), Disp: 100 each, Rfl: 11    lidocaine (LMX) 4 % cream, Apply topically as needed for mild pain, Disp: 30 g, Rfl: 0    lidocaine (XYLOCAINE) 5 % ointment, APPLY TOPICALLY 2GM TWICE A DAY TO AFFECTED AREAS AS NEEDED FOR MILD PAIN (Patient taking differently: Lidocaine patches), Disp: 250 g, Rfl: 8    Linzess 72 MCG CAPS, TAKE ONE CAPSULE BY MOUTH ONCE DAILY, Disp: 90 capsule, Rfl: 10    methocarbamol (ROBAXIN) 500 mg tablet, Take 1 tablet (500 mg total) by mouth 2 (two) times a day, Disp: 20 tablet, Rfl: 0    NON FORMULARY, [facility], Disp: , Rfl:     ondansetron (ZOFRAN) 8 mg tablet, Take 1 tablet (8 mg total) by mouth every 8 (eight) hours as needed for nausea or vomiting, Disp: 30 tablet, Rfl: 2    oxyCODONE (Roxicodone) 15 mg immediate release tablet, Take 1 tablet (15 mg total) by mouth every 4 (four) hours as needed for moderate pain Max Daily Amount: 90 mg, Disp: 180 tablet, Rfl: 0    pantoprazole (PROTONIX) 40 mg tablet, Take 1 tablet (40 mg total) by mouth 2 (two) times a day before meals, Disp: 180 tablet, Rfl: 1    pregabalin (LYRICA) 100 mg capsule, Take 1 capsule (100 mg total) by mouth 3 (three) times a day, Disp: 90 capsule, Rfl: 1    promethazine (PHENERGAN) 25 mg tablet, Take 1 tablet (25 mg total) by mouth every 6 (six) hours as needed for nausea or vomiting, Disp: 60 tablet, Rfl: 3    Restasis 0 05 % ophthalmic emulsion, , Disp: , Rfl:     scopolamine (TRANSDERM-SCOP) 1 5 mg/3 days TD 72 hr patch, Place 1 patch on the skin every third day, Disp: 10 patch, Rfl: 0    sucralfate (CARAFATE) 1 g/10 mL suspension, TAKE 10ML BY MOUTH THREE TIMES A DAY, Disp: 500 mL, Rfl: 2    sucralfate (CARAFATE) 1 g/10 mL suspension, Take 10 mL (1 g total) by mouth in the morning and 10 mL (1 g total) at noon and 10 mL (1 g total) in the evening and 10 mL (1 g total) before bedtime  Do all this for 7 days  , Disp: 420 mL, Rfl: 0    Patient Active Problem List   Diagnosis    Dyslipidemia    Granulomatosis with polyangiitis (HCC)    [facility]    Asthma    Benign essential HTN    Chronic right shoulder pain    Noncompliance    Bipolar 1 disorder (HCC)    Epigastric pain    Pancreatitis    Ovarian cyst    Postherpetic neuralgia    Anemia of chronic disease    Arthralgia of multiple joints    Cataplexy    Weakness of both lower extremities    Diarrhea of presumed infectious origin    Chronic pelvic pain in female    Plantar wart, right foot    Seasonal allergic rhinitis due to pollen    Gastroesophageal reflux disease    Weakness of both upper extremities    Persistent proteinuria    Left leg pain    Controlled substance agreement signed    Chronic narcotic use    Small fiber neuropathy    IBS (irritable bowel syndrome)    Leukocytosis    Hyperosmia    Smell disturbance    Contusion of left hip    Trochanteric bursitis of left hip    Neuropathy    Attention deficit hyperactivity disorder (ADHD)    Elevated blood pressure reading    Costochondritis    Hearing difficulty of both ears    Vaginal atrophy    Alpha-hemolytic Streptococcus positive urine culture    Smoking    Schizo-affective schizophrenia ([address] )    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[address] )    Cervical radiculopathy    Right arm numbness    Fall at home, initial encounter    Sinus bradycardia    Marijuana use    Difficulty ventilating with mask    Low back pain radiating to left lower extremity    Lump of skin of back    Cervical disc disorder with radiculopathy of mid-cervical region    Neck pain    Cervical spinal stenosis    Depression    Continuous opioid dependence (HCC)    Chronic kidney disease, stage 4 (severe) ([facility]    Chronic pain    COVID    PTSD (post-traumatic stress disorder)    Pain of finger of right hand    Sacroiliitis (HCC)    Myofascial pain syndrome    Lumbar spondylosis    Chronic right hip pain    PONV (postoperative nausea and vomiting)       Objective:  /72   Pulse 62   Ht 5' 2" (1 575 m)   Wt 63 5 kg (140 lb)   LMP  (LMP Unknown)   BMI 25 61 kg/m²     Right Hand Exam     Tenderness   The patient is experiencing tenderness in the dorsal area  Range of Motion   The patient has normal right wrist ROM       Other   Erythema: absent  Sensation: decreased  Pulse: present            Physical Exam      Neurologic Exam    Procedures    I have personally reviewed pertinent films in PACS and my interpretation is Xrays Right Wrist:  No acute fractures or dislocation, no degenerative changes

## 2022-06-30 RX ORDER — CLOBETASOL PROPIONATE 0.5 MG/G
CREAM TOPICAL
Qty: 60 G | Refills: 0 | Status: SHIPPED | OUTPATIENT
Start: 2022-06-30 | End: 2022-07-29

## 2022-07-05 ENCOUNTER — OFFICE VISIT (OUTPATIENT)
Dept: OBGYN CLINIC | Facility: HOSPITAL | Age: 52
End: 2022-07-05

## 2022-07-05 ENCOUNTER — TELEPHONE (OUTPATIENT)
Dept: FAMILY MEDICINE CLINIC | Facility: CLINIC | Age: 52
End: 2022-07-05

## 2022-07-05 ENCOUNTER — HOSPITAL ENCOUNTER (OUTPATIENT)
Dept: RADIOLOGY | Facility: HOSPITAL | Age: 52
Discharge: HOME | End: 2022-07-05
Attending: ORTHOPAEDIC SURGERY
Payer: MEDICARE

## 2022-07-05 VITALS
WEIGHT: 140 LBS | HEIGHT: 62 IN | DIASTOLIC BLOOD PRESSURE: 69 MMHG | SYSTOLIC BLOOD PRESSURE: 106 MMHG | HEART RATE: 49 BPM | BODY MASS INDEX: 25.76 KG/M2

## 2022-07-05 DIAGNOSIS — S62.102A CLOSED FRACTURE OF LEFT WRIST, INITIAL ENCOUNTER: ICD-10-CM

## 2022-07-05 DIAGNOSIS — Z98.890 S/P ORIF (OPEN REDUCTION INTERNAL FIXATION) FRACTURE: ICD-10-CM

## 2022-07-05 DIAGNOSIS — Z87.81 S/P ORIF (OPEN REDUCTION INTERNAL FIXATION) FRACTURE: ICD-10-CM

## 2022-07-05 DIAGNOSIS — S62.102A CLOSED FRACTURE OF LEFT WRIST, INITIAL ENCOUNTER: Primary | ICD-10-CM

## 2022-07-05 PROCEDURE — 73110 X-RAY EXAM OF WRIST: CPT

## 2022-07-05 PROCEDURE — 99024 POSTOP FOLLOW-UP VISIT: CPT | Performed by: ORTHOPAEDIC SURGERY

## 2022-07-05 NOTE — TELEPHONE ENCOUNTER
PCP SIGNATURE NEEDED FOR [employer] FORM RECEIVED VIA FAX AND PLACED IN PCP FOLDER TO BE DELIVERED AT ASSIGNED TIMES          [phone]

## 2022-07-05 NOTE — PROGRESS NOTES
Assessment:   Diagnosis ICD-10-CM Associated Orders   1  Closed fracture of left wrist, initial encounter  S62 102A    2  S/P ORIF (open reduction internal fixation) fracture  Z98 890     Z87 81        Plan:  · A discussion was had with the patient that her imaging looks very good at today's visit  · She should try the Velcro brace again now that the dressings are gone  If she is still having trouble we can see her again to see if she would need a larger size  She should wear this brace at most times of the day and night except to shower, clean the hand, do range of motion exercises, and when relaxing at home  · She may do AROM and PROM of the wrist at this time  · The patient was instructed on exercises for the wrist   · Numbness and tingling should continue to improve as the patient gets farther from the surgery date  It is likely due to nerve irritation  · Her sutures were removed and Steri-Strips were applied  She may shower at this time and pat the wounds dry when she is done  The Steri-Strips will fall off on their own in the shower in 7-10 days  She should avoid soaking her wounds in a bath or pool  · The physician was consulted and was instrumental in the formulation of the plan  To do next visit:  Follow-up in 4 weeks for further imaging and to assess post-op pain and function  The above stated was discussed in layman's terms and the patient expressed understanding  All questions were answered to the patient's satisfaction  Subjective:   Rajani John is a 46 y o  female who presents to the office today for a 2-week post-op appointment from her left distal radius ORIF surgery on 6/23/22  Today the patient reports that her pain is mostly well-controlled  She is taking Percocet and pregabalin for pain  She has a removable Velcro wrist brace at home which she did not bring because she was having trouble fitting it over her bulky dressings    She has some numbness and tingling over the volar aspect of the thumb and index finger  Review of systems negative unless otherwise specified in HPI    Past Medical History:   Diagnosis Date    ADHD     Anemia of chronic disease     Anxiety     Arthritis ?  Asthma     Bipolar disorder ([facility] [address] [facility]     Borderline personality disorder ([facility] [address] [facility]     Cataplexy     Chronic abdominal pain     Chronic kidney disease ?  CKD (chronic kidney disease) stage 3, GFR 30-59 ml/min (HCC)     Cushing syndrome (HCC)     Depression ?  Diabetes mellitus ([facility] [address] [facility]     DVT (deep venous thrombosis) (HCC)     GERD (gastroesophageal reflux disease)     Headache(784 0) 3 months    History of acute pancreatitis     felt secondary to Bactrim    History of transfusion     Hypertension     Liver disease     fatty liver    Microscopic polyangiitis (HCC)     Ovarian cyst     PTSD (post-traumatic stress disorder)     Self-inflicted injury     self inflicted skin wounds    Sleep apnea     Wegener's granulomatosis with renal involvement ([facility] [address] ) 2015       Past Surgical History:   Procedure Laterality Date    ESOPHAGOGASTRODUODENOSCOPY  09/11/2015    mild antral gastritis    GASTRIC STIMULATOR IMPLANT SURGERY  06/25/2020    GA COLONOSCOPY FLX DX W/COLLJ SPEC WHEN PFRMD N/A 12/14/2018    adenoma removed from the transverse, hyperplastic polyp removed from the left colon    GA ESOPHAGOGASTRODUODENOSCOPY TRANSORAL DIAGNOSTIC N/A 12/14/2018    gastritis and scant coffee-ground material   Biopsies negative for H  pylori    GA OPEN TX RADIAL & ULNAR SHAFT FX FIX RADIUS AND ULNA Left 6/23/2022    Procedure: OPEN REDUCTION W/ INTERNAL FIXATION (ORIF) RADIUS / ULNA (WRIST); Surgeon: Ashley Will MD;  Location: [facility];  Service: Orthopedics    RELEASE SCAR CONTRACTURE / GRAFT REPAIRS OF HAND Bilateral     UPPER GASTROINTESTINAL ENDOSCOPY  12/26/2019    Dr Willie Arguetaox to the pylorus       Family History   Problem Relation Age of Onset    Arthritis Mother    [facility] Depression Mother     Diabetes Mother     Mental illness Mother    Annette Marin Migraines Mother     No Known Problems Father     Colon cancer Neg Hx     Drug abuse Neg Hx         mother father    Mental illness Neg Hx         disorder, mother father    Cancer Neg Hx     Breast cancer Neg Hx        Social History     Occupational History    Occupation: disability   Tobacco Use    Smoking status: Former Smoker     Packs/day: 1 00     Years: 10 00     Pack years: 10 00     Types: Cigarettes     Quit date: 2011     Years since quittin 5    Smokeless tobacco: Never Used    Tobacco comment: Stopped smoking 11 years ago   Vaping Use    Vaping Use: Never used   Substance and Sexual Activity    Alcohol use: Never    Drug use: Yes     Types: Marijuana     Comment: marijuana daily    Sexual activity: Not Currently     Partners: Male     Birth control/protection: None         Current Outpatient Medications:     acetaminophen (TYLENOL) 500 mg tablet, Take 2 tablets (1,000 mg total) by mouth every 8 (eight) hours as needed for mild pain, Disp: 60 tablet, Rfl: 1    albuterol (2 5 mg/3 mL) 0 083 % nebulizer solution, Take 3 mL (2 5 mg total) by nebulization every 6 (six) hours as needed for wheezing or shortness of breath, Disp: 90 mL, Rfl: 1    albuterol (Ventolin HFA) 90 mcg/act inhaler, Inhale 2 puffs every 6 (six) hours as needed for wheezing or shortness of breath, Disp: 18 g, Rfl: 1    Alcohol Swabs (Alcohol Pads) 70 % PADS, Use 4 (four) times a day, Disp: 400 each, Rfl: 2    amphetamine-dextroamphetamine (ADDERALL XR) 20 MG 24 hr capsule, Take 1 capsule (20 mg total) by mouth 2 (two) times a day Max Daily Amount: 40 mg, Disp: 60 capsule, Rfl: 0    Blood Glucose Monitoring Suppl (FreeStyle Lite) DEIRDRE, Use daily, Disp: 1 each, Rfl: 0    budesonide-formoterol (Symbicort) 80-4 5 MCG/ACT inhaler, Inhale 2 puffs 2 (two) times a day Rinse mouth after use , Disp: 10 2 g, Rfl: 1    buPROPion (WELLBUTRIN XL) 300 mg 24 hr tablet, Take 1 tablet (300 mg total) by mouth every morning, Disp: 30 tablet, Rfl: 2    clobetasol (TEMOVATE) 0 05 % cream, APPLY TOPICALLY TO AFFECTED AREA TWICE A DAY, Disp: 60 g, Rfl: 0    Diclofenac Sodium (VOLTAREN) 1 %, Apply 2 g topically 4 (four) times a day, Disp: 100 g, Rfl: 0    Diclofenac Sodium (VOLTAREN) 1 %, Apply 2 g topically 4 (four) times a day, Disp: 50 g, Rfl: 0    Easy Comfort Lancets MISC, USE TO TEST THE BLOOD SUGAR THREE TIMES A DAY, Disp: 300 each, Rfl: 10    glucose blood (FREESTYLE LITE) test strip, Use as instructed, Disp: 100 strip, Rfl: 0    insulin aspart (NovoLOG FlexPen) 100 UNIT/ML injection pen, Inject 6 Units under the skin 3 (three) times a day with meals, Disp: 15 mL, Rfl: 0    insulin glargine (Toujeo Max SoloStar) 300 units/mL CONCENTRATED U-300 injection pen (2-unit dial), Inject 22 Units under the skin daily (Patient taking differently: Inject 22 Units under the skin daily at bedtime), Disp: 9 mL, Rfl: 0    Insulin Pen Needle (Pen Needles) 31G X 8 MM MISC, Inject under the skin 4 (four) times a day (with meals and at bedtime), Disp: 100 each, Rfl: 11    lidocaine (LMX) 4 % cream, Apply topically as needed for mild pain, Disp: 30 g, Rfl: 0    lidocaine (XYLOCAINE) 5 % ointment, APPLY TOPICALLY 2GM TWICE A DAY TO AFFECTED AREAS AS NEEDED FOR MILD PAIN (Patient taking differently: Lidocaine patches), Disp: 250 g, Rfl: 8    Linzess 72 MCG CAPS, TAKE ONE CAPSULE BY MOUTH ONCE DAILY, Disp: 90 capsule, Rfl: 10    methocarbamol (ROBAXIN) 500 mg tablet, Take 1 tablet (500 mg total) by mouth 2 (two) times a day, Disp: 20 tablet, Rfl: 0    NON FORMULARY, [facility], Disp: , Rfl:     ondansetron (ZOFRAN) 8 mg tablet, Take 1 tablet (8 mg total) by mouth every 8 (eight) hours as needed for nausea or vomiting, Disp: 30 tablet, Rfl: 2    oxyCODONE (Roxicodone) 15 mg immediate release tablet, Take 1 tablet (15 mg total) by mouth every 4 (four) hours as needed for moderate pain Max Daily Amount: 90 mg, Disp: 180 tablet, Rfl: 0    pantoprazole (PROTONIX) 40 mg tablet, Take 1 tablet (40 mg total) by mouth 2 (two) times a day before meals, Disp: 180 tablet, Rfl: 1    pregabalin (LYRICA) 100 mg capsule, Take 1 capsule (100 mg total) by mouth 3 (three) times a day, Disp: 90 capsule, Rfl: 1    promethazine (PHENERGAN) 25 mg tablet, Take 1 tablet (25 mg total) by mouth every 6 (six) hours as needed for nausea or vomiting, Disp: 60 tablet, Rfl: 3    Restasis 0 05 % ophthalmic emulsion, , Disp: , Rfl:     scopolamine (TRANSDERM-SCOP) 1 5 mg/3 days TD 72 hr patch, Place 1 patch on the skin every third day, Disp: 10 patch, Rfl: 0    sucralfate (CARAFATE) 1 g/10 mL suspension, TAKE 10ML BY MOUTH THREE TIMES A DAY, Disp: 500 mL, Rfl: 2    amLODIPine (NORVASC) 5 mg tablet, TAKE ONE TABLET BY MOUTH ONCE DAILY (Patient not taking: Reported on 7/5/2022), Disp: 90 tablet, Rfl: 1    dicyclomine (BENTYL) 20 mg tablet, Take 1 tablet (20 mg total) by mouth in the morning and 1 tablet (20 mg total) in the evening  Do all this for 7 days  , Disp: 20 tablet, Rfl: 0    sucralfate (CARAFATE) 1 g/10 mL suspension, Take 10 mL (1 g total) by mouth in the morning and 10 mL (1 g total) at noon and 10 mL (1 g total) in the evening and 10 mL (1 g total) before bedtime  Do all this for 7 days  , Disp: 420 mL, Rfl: 0    Allergies   Allergen Reactions    Prozac [Fluoxetine Hcl]      SI    Bactrim [Sulfamethoxazole-Trimethoprim]      Pt "They think that is what cause the pancreatitis"     Flagyl [Metronidazole] Diarrhea and Abdominal Pain    Lamictal [Lamotrigine] GI Intolerance    Lithium Other (See Comments)    Haldol [Haloperidol] Other (See Comments)     "I don't like it"    Ibuprofen     Lexapro [Escitalopram Oxalate] Rash    Navane [Thiothixene]      SI    Other      "novaine?" antipsychotic            Vitals:    07/05/22 0826   BP: 106/69   Pulse: (!) 49 Objective:    General:  Patient is WDWN, alert and oriented, appears stated age, and is in no acute distress  Musculoskeletal:    Left Wrist:    Inspection:  Incisions are well-approximated and well-healing without erythema or purulent material indicative of infection  Range of Motion:  The patient is able to move all fingers without difficulty  She is limited in flexion and extension of the wrist secondary to pain  She is able to achieve some radial and ulnar deviation which is painful  Patient has near full active pronation and supination with pain  Sensation:  SILT fingers  Other:  Fingers WWP  Cap refill is less than 2 seconds  Diagnostics, reviewed and taken today if performed as documented: The attending physician has personally reviewed the pertinent films in PACS and interpretation is as follows: The patient's fracture is held in stable alignment  There is no evidence of hardware loosening or failure  Procedures, if performed today:    None performed      Portions of the record may have been created with voice recognition software  Occasional wrong word or "sound a like" substitutions may have occurred due to the inherent limitations of voice recognition software  Read the chart carefully and recognize, using context, where substitutions have occurred

## 2022-07-05 NOTE — TELEPHONE ENCOUNTER
Pharmacy called stating they cannot receive a script through the insurance for oxycodone 15 mg because the insurance is seeing the oxycodone 10 mg were filled  She stated that unless provider calls [phone] and gets med approved, pharmacy will not be able to get it through early and pt will need to wait until 07/13/2022

## 2022-07-07 ENCOUNTER — HOSPITAL ENCOUNTER (OUTPATIENT)
Dept: NEUROLOGY | Facility: CLINIC | Age: 52
Discharge: HOME | End: 2022-07-07
Payer: MEDICARE

## 2022-07-07 ENCOUNTER — TELEPHONE (OUTPATIENT)
Dept: FAMILY MEDICINE CLINIC | Facility: CLINIC | Age: 52
End: 2022-07-07

## 2022-07-07 DIAGNOSIS — M54.12 RADICULOPATHY, CERVICAL REGION: ICD-10-CM

## 2022-07-07 DIAGNOSIS — R20.0 FINGER NUMBNESS: ICD-10-CM

## 2022-07-07 PROCEDURE — 95909 NRV CNDJ TST 5-6 STUDIES: CPT | Performed by: PSYCHIATRY & NEUROLOGY

## 2022-07-07 PROCEDURE — 95886 MUSC TEST DONE W/N TEST COMP: CPT | Performed by: PSYCHIATRY & NEUROLOGY

## 2022-07-07 NOTE — TELEPHONE ENCOUNTER
Per pharmacy Oxycodone 15 mg needs PA due to QL  Form placed in [facility]  Received the PA form and faxed with the last visit note  Confirmation was received  Form placed in the   folder pod 3

## 2022-07-07 NOTE — TELEPHONE ENCOUNTER
Received call from parent/patient: Patient requesting status of medication oxycodone 15mg  Any questions please call [phone]

## 2022-07-08 DIAGNOSIS — F31.9 BIPOLAR 1 DISORDER (HCC): ICD-10-CM

## 2022-07-08 NOTE — TELEPHONE ENCOUNTER
Pt called in again states that pcp told her to take the extra and now shes not answering her, she says she needs pcp to call insurance to approve the 15mg

## 2022-07-08 NOTE — TELEPHONE ENCOUNTER
Pt called in again for an update I relayed message in chart and she wanted me to route another message again that the weekend is coming and she is nervous

## 2022-07-11 DIAGNOSIS — M31.31 GRANULOMATOSIS WITH POLYANGIITIS WITH RENAL INVOLVEMENT (HCC): ICD-10-CM

## 2022-07-11 DIAGNOSIS — G89.4 CHRONIC PAIN SYNDROME: ICD-10-CM

## 2022-07-11 RX ORDER — DEXTROAMPHETAMINE SACCHARATE, AMPHETAMINE ASPARTATE MONOHYDRATE, DEXTROAMPHETAMINE SULFATE AND AMPHETAMINE SULFATE 5; 5; 5; 5 MG/1; MG/1; MG/1; MG/1
20 CAPSULE, EXTENDED RELEASE ORAL 2 TIMES DAILY
Qty: 60 CAPSULE | Refills: 0 | Status: SHIPPED | OUTPATIENT
Start: 2022-07-11 | End: 2022-08-08 | Stop reason: SDUPTHER

## 2022-07-11 RX ORDER — OXYCODONE HYDROCHLORIDE 15 MG/1
15 TABLET ORAL EVERY 4 HOURS PRN
Qty: 180 TABLET | Refills: 0 | OUTPATIENT
Start: 2022-07-11

## 2022-07-11 NOTE — TELEPHONE ENCOUNTER
Patient will be due for refill as of 7/13/22  Patient should then be able to  the increased dose of 15 mg tablet on 7/13/22  Will not be calling insurance to fill prescription prior to 7/13/22  Thanks!

## 2022-07-11 NOTE — TELEPHONE ENCOUNTER
Pt is requesting medication refill for     oxyCODONE (Roxicodone) 15 mg immediate release tablet    Please send to pharmacy on file

## 2022-07-11 NOTE — TELEPHONE ENCOUNTER
Pt came in again requesting status on the below medication refill for, medication needs to be released by the pcp per pharmacy      oxyCODONE (Roxicodone) 15 mg immediate release tablet    Please advise

## 2022-07-12 ENCOUNTER — TELEMEDICINE (OUTPATIENT)
Dept: FAMILY MEDICINE CLINIC | Facility: CLINIC | Age: 52
End: 2022-07-12

## 2022-07-12 ENCOUNTER — TELEPHONE (OUTPATIENT)
Dept: FAMILY MEDICINE CLINIC | Facility: CLINIC | Age: 52
End: 2022-07-12

## 2022-07-12 DIAGNOSIS — G89.4 CHRONIC PAIN SYNDROME: Primary | Chronic | ICD-10-CM

## 2022-07-12 PROCEDURE — 3078F DIAST BP <80 MM HG: CPT | Performed by: PHYSICIAN ASSISTANT

## 2022-07-12 PROCEDURE — 3074F SYST BP LT 130 MM HG: CPT | Performed by: PHYSICIAN ASSISTANT

## 2022-07-12 PROCEDURE — G0071 COMM SVCS BY RHC/FQHC 5 MIN: HCPCS | Performed by: PHYSICIAN ASSISTANT

## 2022-07-12 NOTE — PROGRESS NOTES
Virtual Regular Visit    Verification of patient location:    Patient is located in the following state in which I hold an active license [place]      Assessment/Plan:    Problem List Items Addressed This Visit        Other    Chronic pain - Primary (Chronic)        Chronic pain  - Recently, oxycodone dose was increased from 10 mg, every 4 hours as needed to 15 mg, every 4 hours as needed  However, patient ran out of the 10 mg medication a few days before she was able to receive the 15 mg prescription   - Patient purchased the oxycodone 15 mg prescription out-of-pocket and her pain is now controlled  - Continue oxycodone 15 mg, every 4 hours as needed  - PDMP reviewed  No red flags noted  - UDS up to date  - Continue Lyrica 100 mg 3 times daily as prescribed through pain management  - Continue follow-up with pain management as scheduled  Reason for visit is   Chief Complaint   Patient presents with    Virtual Regular Visit        Encounter provider Garrison Jacobs PA-C    Provider located at [address] [place] [id number] [phone]      Recent Visits  Date Type Provider Dept   07/07/22 Telephone [facility]   07/05/22 Telephone [facility] Mosquera [facility]   Showing recent visits within past 7 days and meeting all other requirements  Today's Visits  Date Type Provider Dept   07/12/22 Telemedicine [facility]   Showing today's visits and meeting all other requirements  Future Appointments  No visits were found meeting these conditions  Showing future appointments within next 150 days and meeting all other requirements       The patient was identified by name and date of birth  Chaparro Ledesma was informed that this is a telemedicine visit and that the visit is being conducted through Telephone  My office door was closed  No one else was in the room    She acknowledged consent and understanding of privacy and security of the video platform  The patient has agreed to participate and understands they can discontinue the visit at any time  Patient is aware this is a billable service  Subjective  Shaun Bryson is a 46 y o  female with known PHM of gastroparesis, GERD, constipation, ADHD, type 2 diabetes mellitus, cataplexy, Wegener's granulomatosis, small fiber neuropathy, bipolar 1 disorder who is seen today via telemedicine for chronic pain follow up  - Patient is a 49-year-old female who is seen today via telemedicine for chronic pain follow-up  Recently, oxycodone dose was increased from 10 mg,  Every 4 hours as needed to 15 mg, every 4 hours as needed  However, patient ran out of the 10 mg prescription before she was able to receive the 15 mg prescription  Patient started to experience chills, headaches, and increased anxiety  Patient notes she purchased the oxycodone 15 mg prescription out-of-pocket and now her pain is under control  Past Medical History:   Diagnosis Date    ADHD     Anemia of chronic disease     Anxiety     Arthritis ?  Asthma     [facility] disorder [facility] [address] [facility]     Borderline personality disorder ([facility] [address] )     Cataplexy     Chronic abdominal pain     Chronic kidney disease ?  CKD (chronic kidney disease) stage 3, GFR 30-59 ml/min (HCC)     Cushing syndrome (HCC)     Depression ?     Diabetes mellitus ([address] )     DVT (deep venous thrombosis) (HCC)     GERD (gastroesophageal reflux disease)     Headache(784 0) 3 months    History of acute pancreatitis     felt secondary to Bactrim    History of transfusion     Hypertension     Liver disease     fatty liver    Microscopic polyangiitis (HCC)     Ovarian cyst     PTSD (post-traumatic stress disorder)     Self-inflicted injury     self inflicted skin wounds    Sleep apnea     Wegener's granulomatosis with renal involvement ([facility] [address] ) 2015       Past Surgical History:   Procedure Laterality Date  ESOPHAGOGASTRODUODENOSCOPY  09/11/2015    mild antral gastritis    GASTRIC STIMULATOR IMPLANT SURGERY  06/25/2020    MN COLONOSCOPY FLX DX W/COLLJ SPEC WHEN PFRMD N/A 12/14/2018    adenoma removed from the transverse, hyperplastic polyp removed from the left colon    MN ESOPHAGOGASTRODUODENOSCOPY TRANSORAL DIAGNOSTIC N/A 12/14/2018    gastritis and scant coffee-ground material   Biopsies negative for H  pylori    MN OPEN TX RADIAL & ULNAR SHAFT FX FIX RADIUS AND ULNA Left 6/23/2022    Procedure: OPEN REDUCTION W/ INTERNAL FIXATION (ORIF) RADIUS / ULNA (WRIST); Surgeon: Radha Hinson MD;  Location: [facility];  Service: Orthopedics    RELEASE SCAR CONTRACTURE / GRAFT REPAIRS OF HAND Bilateral     UPPER GASTROINTESTINAL ENDOSCOPY  12/26/2019    Dr Farshad Arguetaox to the pylorus       Current Outpatient Medications   Medication Sig Dispense Refill    acetaminophen (TYLENOL) 500 mg tablet Take 2 tablets (1,000 mg total) by mouth every 8 (eight) hours as needed for mild pain 60 tablet 1    albuterol (2 5 mg/3 mL) 0 083 % nebulizer solution Take 3 mL (2 5 mg total) by nebulization every 6 (six) hours as needed for wheezing or shortness of breath 90 mL 1    albuterol (Ventolin HFA) 90 mcg/act inhaler Inhale 2 puffs every 6 (six) hours as needed for wheezing or shortness of breath 18 g 1    Alcohol Swabs (Alcohol Pads) 70 % PADS Use 4 (four) times a day 400 each 2    amLODIPine (NORVASC) 5 mg tablet TAKE ONE TABLET BY MOUTH ONCE DAILY (Patient not taking: Reported on 7/5/2022) 90 tablet 1    amphetamine-dextroamphetamine (ADDERALL XR) 20 MG 24 hr capsule Take 1 capsule (20 mg total) by mouth 2 (two) times a day Max Daily Amount: 40 mg 60 capsule 0    Blood Glucose Monitoring Suppl (FreeStyle Lite) DEIRDRE Use daily 1 each 0    budesonide-formoterol (Symbicort) 80-4 5 MCG/ACT inhaler Inhale 2 puffs 2 (two) times a day Rinse mouth after use   10 2 g 1    buPROPion (WELLBUTRIN XL) 300 mg 24 hr tablet Take 1 tablet (300 mg total) by mouth every morning 30 tablet 2    clobetasol (TEMOVATE) 0 05 % cream APPLY TOPICALLY TO AFFECTED AREA TWICE A DAY 60 g 0    Diclofenac Sodium (VOLTAREN) 1 % Apply 2 g topically 4 (four) times a day 100 g 0    Diclofenac Sodium (VOLTAREN) 1 % Apply 2 g topically 4 (four) times a day 50 g 0    dicyclomine (BENTYL) 20 mg tablet Take 1 tablet (20 mg total) by mouth in the morning and 1 tablet (20 mg total) in the evening  Do all this for 7 days  20 tablet 0    Easy Comfort Lancets MISC USE TO TEST THE BLOOD SUGAR THREE TIMES A  each 10    glucose blood (FREESTYLE LITE) test strip Use as instructed 100 strip 0    insulin aspart (NovoLOG FlexPen) 100 UNIT/ML injection pen Inject 6 Units under the skin 3 (three) times a day with meals 15 mL 0    insulin glargine (Toujeo Max SoloStar) 300 units/mL CONCENTRATED U-300 injection pen (2-unit dial) Inject 22 Units under the skin daily (Patient taking differently: Inject 22 Units under the skin daily at bedtime) 9 mL 0    Insulin Pen Needle (Pen Needles) 31G X 8 MM MISC Inject under the skin 4 (four) times a day (with meals and at bedtime) 100 each 11    lidocaine (LMX) 4 % cream Apply topically as needed for mild pain 30 g 0    lidocaine (XYLOCAINE) 5 % ointment APPLY TOPICALLY 2GM TWICE A DAY TO AFFECTED AREAS AS NEEDED FOR MILD PAIN (Patient taking differently: Lidocaine patches) 250 g 8    Linzess 72 MCG CAPS TAKE ONE CAPSULE BY MOUTH ONCE DAILY 90 capsule 10    methocarbamol (ROBAXIN) 500 mg tablet Take 1 tablet (500 mg total) by mouth 2 (two) times a day 20 tablet 0    naloxone (NARCAN) 4 mg/0 1 mL nasal spray Administer 1 spray into a nostril  If no response after 2-3 minutes, give another dose in the other nostril using a new spray   1 each 1    NON FORMULARY Medical [facility]      ondansetron (ZOFRAN) 8 mg tablet Take 1 tablet (8 mg total) by mouth every 8 (eight) hours as needed for nausea or vomiting 30 tablet 2    oxyCODONE (Roxicodone) 15 mg immediate release tablet Take 1 tablet (15 mg total) by mouth every 4 (four) hours as needed for moderate pain Max Daily Amount: 90 mg 180 tablet 0    pantoprazole (PROTONIX) 40 mg tablet Take 1 tablet (40 mg total) by mouth 2 (two) times a day before meals 180 tablet 1    pregabalin (LYRICA) 100 mg capsule Take 1 capsule (100 mg total) by mouth 3 (three) times a day 90 capsule 1    promethazine (PHENERGAN) 25 mg tablet Take 1 tablet (25 mg total) by mouth every 6 (six) hours as needed for nausea or vomiting 60 tablet 3    Restasis 0 05 % ophthalmic emulsion       scopolamine (TRANSDERM-SCOP) 1 5 mg/3 days TD 72 hr patch Place 1 patch on the skin every third day 10 patch 0    sucralfate (CARAFATE) 1 g/10 mL suspension TAKE 10ML BY MOUTH THREE TIMES A  mL 2    sucralfate (CARAFATE) 1 g/10 mL suspension Take 10 mL (1 g total) by mouth in the morning and 10 mL (1 g total) at noon and 10 mL (1 g total) in the evening and 10 mL (1 g total) before bedtime  Do all this for 7 days  420 mL 0     No current facility-administered medications for this visit  Allergies   Allergen Reactions    Prozac [Fluoxetine Hcl]      SI    Bactrim [Sulfamethoxazole-Trimethoprim]      Pt "They think that is what cause the pancreatitis"     Flagyl [Metronidazole] Diarrhea and Abdominal Pain    Lamictal [Lamotrigine] GI Intolerance    Lithium Other (See Comments)    Haldol [Haloperidol] Other (See Comments)     "I don't like it"    Ibuprofen     Lexapro [Escitalopram Oxalate] Rash    Navane [Thiothixene]      SI    Other      "novaine?" antipsychotic       Review of Systems   Constitutional: Negative for chills and fever  HENT: Negative for congestion and sore throat  Respiratory: Negative for cough, chest tightness and shortness of breath  Cardiovascular: Negative for chest pain and leg swelling  Gastrointestinal: Negative for abdominal pain, constipation, diarrhea and vomiting  Genitourinary: Negative for dysuria  Musculoskeletal: Positive for arthralgias, back pain, myalgias and neck pain  Neurological: Positive for numbness and headaches  Negative for dizziness  Psychiatric/Behavioral: Negative for behavioral problems, self-injury and suicidal ideas  Video Exam    There were no vitals filed for this visit  Physical Exam  Constitutional:       General: She is not in acute distress  Pulmonary:      Effort: Pulmonary effort is normal  No respiratory distress  Neurological:      Mental Status: She is alert and oriented to person, place, and time  Psychiatric:         Speech: Speech normal           I spent 12 minutes directly with the patient during this visit     It was my intent to perform this visit via video technology but the patient was not able to do a video connection so the visit was completed via audio telephone only  VIRTUAL VISIT DISCLAIMER      Reny Cote verbally agrees to participate in [employer]  Pt is aware that [employer] could be limited without vital signs or the ability to perform a full hands-on physical Adeola Form understands she or the provider may request at any time to terminate the video visit and request the patient to seek care or treatment in person

## 2022-07-12 NOTE — TELEPHONE ENCOUNTER
Claudy Cramer form received on 07/12/22  to be completed by PCP  Copy made and placed in PCP folder  Forms to be delivered to PCP mailbox at assigned time      OXYCODONE HCL 15 MG    CASE# [id number]

## 2022-07-15 DIAGNOSIS — Z87.81 S/P ORIF (OPEN REDUCTION INTERNAL FIXATION) FRACTURE: Primary | ICD-10-CM

## 2022-07-15 DIAGNOSIS — Z98.890 S/P ORIF (OPEN REDUCTION INTERNAL FIXATION) FRACTURE: Primary | ICD-10-CM

## 2022-07-19 DIAGNOSIS — J45.30 MILD PERSISTENT ASTHMA WITHOUT COMPLICATION: ICD-10-CM

## 2022-07-19 RX ORDER — ALBUTEROL SULFATE 2.5 MG/3ML
SOLUTION RESPIRATORY (INHALATION)
Qty: 90 ML | Refills: 0 | Status: SHIPPED | OUTPATIENT
Start: 2022-07-19

## 2022-07-20 ENCOUNTER — TELEPHONE (OUTPATIENT)
Dept: FAMILY MEDICINE CLINIC | Facility: CLINIC | Age: 52
End: 2022-07-20

## 2022-07-20 ENCOUNTER — LAB (OUTPATIENT)
Dept: LAB | Facility: CLINIC | Age: 52
End: 2022-07-20
Payer: MEDICARE

## 2022-07-20 ENCOUNTER — OFFICE VISIT (OUTPATIENT)
Dept: FAMILY MEDICINE CLINIC | Facility: CLINIC | Age: 52
End: 2022-07-20

## 2022-07-20 VITALS
HEIGHT: 62 IN | RESPIRATION RATE: 16 BRPM | HEART RATE: 68 BPM | OXYGEN SATURATION: 99 % | WEIGHT: 143 LBS | DIASTOLIC BLOOD PRESSURE: 70 MMHG | BODY MASS INDEX: 26.31 KG/M2 | SYSTOLIC BLOOD PRESSURE: 124 MMHG | TEMPERATURE: 98.2 F

## 2022-07-20 DIAGNOSIS — D63.8 ANEMIA OF CHRONIC DISEASE: ICD-10-CM

## 2022-07-20 DIAGNOSIS — N18.4 BENIGN HYPERTENSION WITH CKD (CHRONIC KIDNEY DISEASE) STAGE IV (HCC): ICD-10-CM

## 2022-07-20 DIAGNOSIS — N18.32 STAGE 3B CHRONIC KIDNEY DISEASE (HCC): ICD-10-CM

## 2022-07-20 DIAGNOSIS — N17.9 ACUTE RENAL FAILURE SUPERIMPOSED ON STAGE 3B CHRONIC KIDNEY DISEASE, UNSPECIFIED ACUTE RENAL FAILURE TYPE (HCC): ICD-10-CM

## 2022-07-20 DIAGNOSIS — M31.7 MPA (MICROSCOPIC POLYANGIITIS) (HCC): ICD-10-CM

## 2022-07-20 DIAGNOSIS — N18.4 CKD (CHRONIC KIDNEY DISEASE) STAGE 4, GFR 15-29 ML/MIN (HCC): ICD-10-CM

## 2022-07-20 DIAGNOSIS — N18.32 ACUTE RENAL FAILURE SUPERIMPOSED ON STAGE 3B CHRONIC KIDNEY DISEASE, UNSPECIFIED ACUTE RENAL FAILURE TYPE (HCC): ICD-10-CM

## 2022-07-20 DIAGNOSIS — I12.9 BENIGN HYPERTENSION WITH CKD (CHRONIC KIDNEY DISEASE) STAGE IV (HCC): ICD-10-CM

## 2022-07-20 DIAGNOSIS — R11.2 NAUSEA AND VOMITING, UNSPECIFIED VOMITING TYPE: Primary | ICD-10-CM

## 2022-07-20 DIAGNOSIS — R80.1 PERSISTENT PROTEINURIA: ICD-10-CM

## 2022-07-20 DIAGNOSIS — K31.84 GASTROPARESIS: ICD-10-CM

## 2022-07-20 LAB
ALBUMIN SERPL BCP-MCNC: 3.4 G/DL (ref 3.5–5)
ALP SERPL-CCNC: 108 U/L (ref 46–116)
ALT SERPL W P-5'-P-CCNC: 18 U/L (ref 12–78)
ANION GAP SERPL CALCULATED.3IONS-SCNC: 6 MMOL/L (ref 4–13)
AST SERPL W P-5'-P-CCNC: 20 U/L (ref 5–45)
BILIRUB SERPL-MCNC: 0.25 MG/DL (ref 0.2–1)
BUN SERPL-MCNC: 27 MG/DL (ref 5–25)
CALCIUM ALBUM COR SERPL-MCNC: 9.4 MG/DL (ref 8.3–10.1)
CALCIUM SERPL-MCNC: 8.9 MG/DL (ref 8.3–10.1)
CHLORIDE SERPL-SCNC: 113 MMOL/L (ref 96–108)
CO2 SERPL-SCNC: 24 MMOL/L (ref 21–32)
CREAT SERPL-MCNC: 2.09 MG/DL (ref 0.6–1.3)
ERYTHROCYTE [DISTWIDTH] IN BLOOD BY AUTOMATED COUNT: 13.7 % (ref 11.6–15.1)
GFR SERPL CREATININE-BSD FRML MDRD: 26 ML/MIN/1.73SQ M
GLUCOSE P FAST SERPL-MCNC: 94 MG/DL (ref 65–99)
HCT VFR BLD AUTO: 40.7 % (ref 34.8–46.1)
HGB BLD-MCNC: 12.9 G/DL (ref 11.5–15.4)
MCH RBC QN AUTO: 30.4 PG (ref 26.8–34.3)
MCHC RBC AUTO-ENTMCNC: 31.7 G/DL (ref 31.4–37.4)
MCV RBC AUTO: 96 FL (ref 82–98)
PLATELET # BLD AUTO: 215 THOUSANDS/UL (ref 149–390)
PMV BLD AUTO: 11.8 FL (ref 8.9–12.7)
POTASSIUM SERPL-SCNC: 4.7 MMOL/L (ref 3.5–5.3)
PROT SERPL-MCNC: 7.3 G/DL (ref 6.4–8.4)
PTH-INTACT SERPL-MCNC: 87.7 PG/ML (ref 18.4–80.1)
RBC # BLD AUTO: 4.24 MILLION/UL (ref 3.81–5.12)
SODIUM SERPL-SCNC: 143 MMOL/L (ref 135–147)
URATE SERPL-MCNC: 6.4 MG/DL (ref 2–7.5)
WBC # BLD AUTO: 5.04 THOUSAND/UL (ref 4.31–10.16)

## 2022-07-20 PROCEDURE — 86037 ANCA TITER EACH ANTIBODY: CPT

## 2022-07-20 PROCEDURE — 85027 COMPLETE CBC AUTOMATED: CPT

## 2022-07-20 PROCEDURE — 80053 COMPREHEN METABOLIC PANEL: CPT

## 2022-07-20 PROCEDURE — 83520 IMMUNOASSAY QUANT NOS NONAB: CPT

## 2022-07-20 PROCEDURE — 84550 ASSAY OF BLOOD/URIC ACID: CPT

## 2022-07-20 PROCEDURE — 83970 ASSAY OF PARATHORMONE: CPT

## 2022-07-20 PROCEDURE — 99213 OFFICE O/P EST LOW 20 MIN: CPT | Performed by: PHYSICIAN ASSISTANT

## 2022-07-20 PROCEDURE — 36415 COLL VENOUS BLD VENIPUNCTURE: CPT

## 2022-07-20 PROCEDURE — 3078F DIAST BP <80 MM HG: CPT | Performed by: PHYSICIAN ASSISTANT

## 2022-07-20 PROCEDURE — 3074F SYST BP LT 130 MM HG: CPT | Performed by: PHYSICIAN ASSISTANT

## 2022-07-20 NOTE — PROGRESS NOTES
Assessment/Plan:    Nausea and vomiting  - Present for the past few days with associated decreased urine output and decreased fluid and food intake  - Recommend completing blood work as ordered through Nephrology to assess kidney function  Patient was previously receiving IVF to help with dehydration  This may be something patient could benefit from having again    - Recommend scheduling follow up appointment with Gastroenterology  - Continue zofran 8 mg, as needed for nausea  - Advised to contact the office or report to the ED if symptoms persist, worsen, or new symptoms arise such as fevers  Diagnoses and all orders for this visit:    Nausea and vomiting, unspecified vomiting type          All of patients questions were answered  Patient understands and agrees with the above plan  Return for Next scheduled follow up as scheduled 7/27/22  Benito Sabillon PA-C  07/20/22  [address] [facility]          Subjective:     Patient ID: Tasneem Do  is a 46 y o  female with known PHM of gastroparesis, GERD, constipation, ADHD, type 2 diabetes mellitus, cataplexy, Wegener's granulomatosis, small fiber neuropathy, bipolar 1 disorder who presents today in office for same day visit for vomiting      - Patient is a 46 y o  female who presents today for same day visit for vomiting  Patient notes for the past 5-6 days she has been experiencing increased nausea and vomiting  Patient has been taking Zofran 8 mg with little relief  Patient admits to associated decreased oral intake   And decreased urine output  Patient denies any associated fevers, chills, diarrhea, increased abdominal pain  Patient does experience constipation, but takes Linzess which is effective  Of note, patient notes he has been experiencing some hot flashes again, usually at night  Patient notes the last time she had the hot flashes with about 1 year ago    Also, patient notes she stopped taking Lyrica about 1 5 weeks ago because it was causing low blood pressure  Patient does have follow-up with pain management scheduled for tomorrow  The following portions of the patient's history were reviewed and updated as appropriate: allergies, current medications, past family history, past medical history, past social history, past surgical history and problem list         Review of Systems   Constitutional: Positive for appetite change (decreased)  Negative for chills and fever  HENT: Negative for congestion and sore throat  Respiratory: Negative for cough, chest tightness and shortness of breath  Cardiovascular: Negative for chest pain and leg swelling  Gastrointestinal: Positive for constipation, nausea and vomiting  Negative for abdominal pain and diarrhea  Genitourinary: Negative for dysuria  Decreased urine output   Musculoskeletal: Positive for arthralgias, back pain, myalgias and neck pain  Neurological: Positive for numbness and headaches  Negative for dizziness  Psychiatric/Behavioral: Negative for behavioral problems, self-injury and suicidal ideas  Objective:   Vitals:    07/20/22 0916   BP: 124/70   BP Location: Right arm   Patient Position: Sitting   Cuff Size: Standard   Pulse: 68   Resp: 16   Temp: 98 2 °F (36 8 °C)   TempSrc: Temporal   SpO2: 99%   Weight: 64 9 kg (143 lb)   Height: 5' 2" (1 575 m)         Physical Exam  Vitals and nursing note reviewed  Constitutional:       General: She is not in acute distress  Appearance: She is well-developed  Comments: Examined in wheelchair   HENT:      Head: Normocephalic and atraumatic  Right Ear: External ear normal       Left Ear: External ear normal       Nose: Nose normal    Eyes:      Conjunctiva/sclera: Conjunctivae normal    Cardiovascular:      Rate and Rhythm: Normal rate and regular rhythm  Pulses: Normal pulses  Heart sounds: Normal heart sounds     Pulmonary:      Effort: Pulmonary effort is normal  No respiratory distress  Breath sounds: Normal breath sounds  No wheezing  Abdominal:      General: Bowel sounds are normal       Palpations: Abdomen is soft  Tenderness: There is no abdominal tenderness  Musculoskeletal:      Cervical back: Normal range of motion and neck supple  Skin:     General: Skin is warm and dry  Neurological:      Mental Status: She is alert and oriented to person, place, and time     Psychiatric:         Behavior: Behavior normal

## 2022-07-21 ENCOUNTER — OFFICE VISIT (OUTPATIENT)
Dept: PAIN MEDICINE | Facility: CLINIC | Age: 52
End: 2022-07-21
Payer: MEDICARE

## 2022-07-21 ENCOUNTER — TELEPHONE (OUTPATIENT)
Dept: NEPHROLOGY | Facility: CLINIC | Age: 52
End: 2022-07-21

## 2022-07-21 VITALS
SYSTOLIC BLOOD PRESSURE: 124 MMHG | HEART RATE: 47 BPM | DIASTOLIC BLOOD PRESSURE: 76 MMHG | WEIGHT: 143 LBS | HEIGHT: 62 IN | BODY MASS INDEX: 26.31 KG/M2

## 2022-07-21 DIAGNOSIS — G89.4 CHRONIC PAIN SYNDROME: Primary | ICD-10-CM

## 2022-07-21 DIAGNOSIS — M54.2 NECK PAIN: ICD-10-CM

## 2022-07-21 DIAGNOSIS — M46.1 SACROILIITIS (HCC): ICD-10-CM

## 2022-07-21 DIAGNOSIS — M79.18 MYOFASCIAL PAIN SYNDROME: ICD-10-CM

## 2022-07-21 DIAGNOSIS — M54.50 CHRONIC BILATERAL LOW BACK PAIN WITHOUT SCIATICA: ICD-10-CM

## 2022-07-21 DIAGNOSIS — M25.50 POLYARTHRALGIA: ICD-10-CM

## 2022-07-21 DIAGNOSIS — G89.29 CHRONIC BILATERAL LOW BACK PAIN WITHOUT SCIATICA: ICD-10-CM

## 2022-07-21 PROCEDURE — 99214 OFFICE O/P EST MOD 30 MIN: CPT | Performed by: NURSE PRACTITIONER

## 2022-07-21 RX ORDER — PREGABALIN 100 MG/1
100 CAPSULE ORAL 3 TIMES DAILY
Qty: 90 CAPSULE | Refills: 1 | Status: SHIPPED | OUTPATIENT
Start: 2022-07-21

## 2022-07-21 NOTE — PROGRESS NOTES
Pain Medicine Follow-Up Note    Assessment:  1  Chronic pain syndrome    2  Polyarthralgia    3  Neck pain    4  Chronic bilateral low back pain without sciatica    5  Sacroiliitis ([address] )    6  Myofascial pain syndrome        Plan:  While the patient was in the office today, I did have a thorough conversation regarding their chronic pain syndrome, medication management, and treatment plan options  Patient is being seen for follow-up visit  She recently underwent ultrasound-guided thoracolumbar trigger point injections on 06/22/2022  Overall, she reports that her back pain has improved by about 50%  Back pain has become pretty tolerable at this time  She is complaining of increased neck pain again  Previous C7-T1 epidural steroid injection provided her with up to 70% improvement  Patient is complaining of diffuse joint pain as well as recent increased fatigue and generalized malaise  At this point, we will refer her for rheumatology evaluation  She suffers from chronic kidney disease and has been having issues lately  She was seen by her PCP yesterday for nausea and vomiting and will be following up with her nephrologist again in the next couple weeks  She actually thought she was here to discuss the issues with her kidneys, nausea and vomiting today  Continue Lyrica 100 mg 3 times daily  Prescription was sent to her pharmacy  The patient will follow-up in 6 weeks for medication prescription refill and reevaluation  The patient was advised to contact the office should their symptoms worsen in the interim  The patient was agreeable and verbalized an understanding          Orders Placed This Encounter   Procedures    Ambulatory Referral to Rheumatology     Standing Status:   Future     Standing Expiration Date:   7/21/2023     Referral Priority:   Routine     Referral Type:   Consult - AMB     Referral Reason:   Specialty Services Required     Referred to Provider:   Wes Farrell MD     Requested Specialty:   Rheumatology     Number of Visits Requested:   1     Expiration Date:   7/21/2023       New Medications Ordered This Visit   Medications    pregabalin (LYRICA) 100 mg capsule     Sig: Take 1 capsule (100 mg total) by mouth 3 (three) times a day     Dispense:  90 capsule     Refill:  1       My impressions and treatment recommendations were discussed in detail with the patient who verbalized understanding and had no further questions  History of Present Illness:    Juan Antonio Barbosa is a 46 y o  female who presents to [facility] for interval re-evaluation of the above stated pain complaints  The patient has a past medical and chronic pain history as outlined in the assessment section  She was last seen on 06/22/2022  Patient is complaining of diffuse joint pain  She states that most of her pain at this time is in her neck and can radiate down the right upper extremity  Also, low back pain  Current pain level in her neck is an 8/10  Pain in the back is a 4/10  Other than as stated above, the patient denies any interval changes in medications, medical condition, mental condition, symptoms, or allergies since the last office visit  Review of Systems:    Review of Systems   Constitutional: Positive for activity change, appetite change, fatigue and fever  HENT: Positive for dental problem, hearing loss, mouth sores and postnasal drip  Eyes: Positive for pain and itching  Respiratory: Positive for chest tightness and shortness of breath  Cardiovascular: Positive for chest pain  Gastrointestinal: Positive for abdominal pain, constipation, nausea and vomiting  Genitourinary: Positive for decreased urine volume, difficulty urinating, frequency and urgency  Musculoskeletal: Positive for back pain, joint swelling, neck pain and neck stiffness  Skin: Negative  Allergic/Immunologic: Positive for environmental allergies     Neurological: Positive for dizziness, speech difficulty, weakness, light-headedness, numbness and headaches  Hematological: Bruises/bleeds easily  Psychiatric/Behavioral: Positive for confusion, dysphoric mood and sleep disturbance  The patient is nervous/anxious and is hyperactive            Patient Active Problem List   Diagnosis    Dyslipidemia    Granulomatosis with polyangiitis (HCC)    MPA [facility]sc[facility]angiitis) [facility])    Asthma    Benign essential HTN    Chronic right shoulder pain    Noncompliance    Bipolar 1 disorder ([facility])    Epigastric pain    Pancreatitis    Ovarian cyst    Postherpetic neuralgia    Anemia of chronic disease    Arthralgia of multiple joints    Cataplexy    Weakness of both lower extremities    Diarrhea of presumed infectious origin    Chronic pelvic pain in female    Plantar wart, right foot    Seasonal allergic rhinitis due to pollen    Gastroesophageal reflux disease    Weakness of both upper extremities    Persistent proteinuria    Left leg pain    Controlled substance agreement signed    Chronic narcotic use    Small fiber neuropathy    IBS (irritable bowel syndrome)    Leukocytosis    Hyperosmia    Smell disturbance    Contusion of left hip    Trochanteric bursitis of left hip    Neuropathy    Attention deficit hyperactivity disorder (ADHD)    Elevated blood pressure reading    Costochondritis    Hearing difficulty of both ears    Vaginal atrophy    Alpha-hemolytic Streptococcus positive urine culture    Smoking    Schizo-affective schizophrenia ([address] )    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[address] )    Cervical radiculopathy    Finger numbness    Fall at home, initial encounter    Sinus bradycardia    Marijuana use    Difficulty ventilating with mask    Chronic bilateral low back pain without sciatica    Lump of skin of back    Cervical disc disorder with radiculopathy of mid-cervical region    Neck pain    Cervical spinal stenosis    Depression    Continuous opioid dependence ([place] [address] )    Chronic kidney disease, stage 4 (severe) (HCC)    Chronic pain    COVID    PTSD (post-traumatic stress disorder)    Pain of finger of right hand    Sacroiliitis (HCC)    Myofascial pain syndrome    Lumbar spondylosis    Chronic right hip pain    PONV (postoperative nausea and vomiting)       Past Medical History:   Diagnosis Date    ADHD     Anemia of chronic disease     Anxiety     Arthritis ?  Asthma     Bipolar disorder ([place] [address] )     Borderline personality disorder ([place] [address] )     Cataplexy     Chronic abdominal pain     Chronic kidney disease ?  CKD (chronic kidney disease) stage 3, GFR 30-59 ml/min (HCC)     Cushing syndrome (HCC)     Depression ?  Diabetes mellitus ([place] [address] )     D[facility]deep venous thrombosis) [facility])     GERD (gastroesophageal reflux disease)     Headache(784 0) 3 months    History of acute pancreatitis     felt secondary to Bactrim    History of transfusion     Hypertension     Liver disease     fatty liver    Microscopic polyangiitis (HCC)     Ovarian cyst     PTSD (post-traumatic stress disorder)     Self-inflicted injury     self inflicted skin wounds    Sleep apnea     Wegener's granulomatosis with renal involvement ([place] [address] ) 2015       Past Surgical History:   Procedure Laterality Date    ESOPHAGOGASTRODUODENOSCOPY  09/11/2015    mild antral gastritis    GASTRIC STIMULATOR IMPLANT SURGERY  06/25/2020    [place] COLONOSCOPY FLX DX W/COLLJ SPEC WHEN PFRMD N/A 12/14/2018    adenoma removed from the transverse, hyperplastic polyp removed from the left colon    NJ ESOPHAGOGASTRODUODENOSCOPY TRANSORAL DIAGNOSTIC N/A 12/14/2018    gastritis and scant coffee-ground material   Biopsies negative for H  pylori    NJ OPEN TX RADIAL & ULNAR SHAFT FX FIX RADIUS AND ULNA Left 6/23/2022    Procedure: OPEN REDUCTION W/ INTERNAL FIXATION (ORIF) RADIUS / ULNA (WRIST);   Surgeon: Gene Bentley MD;  Location: [facility];  Service: Orthopedics    RELEASE SCAR CONTRACTURE / GRAFT REPAIRS OF HAND Bilateral     UPPER GASTROINTESTINAL ENDOSCOPY  2019    Dr Timmy Vincent to the pylorus       Family History   Problem Relation Age of Onset    Arthritis Mother     Depression Mother     Diabetes Mother     Mental illness Mother    [place] Migraines Mother     No Known Problems Father     Colon cancer Neg Hx     Drug abuse Neg Hx         mother father    Mental illness Neg Hx         disorder, mother father    Cancer Neg Hx     Breast cancer Neg Hx        Social History     Occupational History    Occupation: disability   Tobacco Use    Smoking status: Former Smoker     Packs/day: 1 00     Years: 10 00     Pack years: 10 00     Types: Cigarettes     Quit date: 2011     Years since quittin 5    Smokeless tobacco: Never Used    Tobacco comment: Stopped smoking 11 years ago   Vaping Use    Vaping Use: Never used   Substance and Sexual Activity    Alcohol use: Never    Drug use: Yes     Types: Marijuana     Comment: marijuana daily    Sexual activity: Not Currently     Partners: Male     Birth control/protection: None         Current Outpatient Medications:     pregabalin (LYRICA) 100 mg capsule, Take 1 capsule (100 mg total) by mouth 3 (three) times a day, Disp: 90 capsule, Rfl: 1    acetaminophen (TYLENOL) 500 mg tablet, Take 2 tablets (1,000 mg total) by mouth every 8 (eight) hours as needed for mild pain, Disp: 60 tablet, Rfl: 1    albuterol (2 5 mg/3 mL) 0 083 % nebulizer solution, USE 1 VIAL VIA NEBULIZER EVERY 6 HOURS AS NEEDED FOR WHEEZING OR SHORTNESS OF BREATH, Disp: 90 mL, Rfl: 0    albuterol (Ventolin HFA) 90 mcg/act inhaler, Inhale 2 puffs every 6 (six) hours as needed for wheezing or shortness of breath, Disp: 18 g, Rfl: 1    Alcohol Swabs (Alcohol Pads) 70 % PADS, Use 4 (four) times a day, Disp: 400 each, Rfl: 2    amLODIPine (NORVASC) 5 mg tablet, TAKE ONE TABLET BY MOUTH ONCE DAILY (Patient not taking: Reported on 2022), Disp: 90 tablet, Rfl: 1    amphetamine-dextroamphetamine (ADDERALL XR) 20 MG 24 hr capsule, Take 1 capsule (20 mg total) by mouth 2 (two) times a day Max Daily Amount: 40 mg, Disp: 60 capsule, Rfl: 0    Blood Glucose Monitoring Suppl (FreeStyle Lite) DEIRDRE, Use daily, Disp: 1 each, Rfl: 0    budesonide-formoterol (Symbicort) 80-4 5 MCG/ACT inhaler, Inhale 2 puffs 2 (two) times a day Rinse mouth after use , Disp: 10 2 g, Rfl: 1    buPROPion (WELLBUTRIN XL) 300 mg 24 hr tablet, Take 1 tablet (300 mg total) by mouth every morning, Disp: 30 tablet, Rfl: 2    clobetasol (TEMOVATE) 0 05 % cream, APPLY TOPICALLY TO AFFECTED AREA TWICE A DAY, Disp: 60 g, Rfl: 0    Diclofenac Sodium (VOLTAREN) 1 %, Apply 2 g topically 4 (four) times a day, Disp: 100 g, Rfl: 0    Diclofenac Sodium (VOLTAREN) 1 %, Apply 2 g topically 4 (four) times a day, Disp: 50 g, Rfl: 0    dicyclomine (BENTYL) 20 mg tablet, Take 1 tablet (20 mg total) by mouth in the morning and 1 tablet (20 mg total) in the evening  Do all this for 7 days  , Disp: 20 tablet, Rfl: 0    Easy Comfort Lancets MISC, USE TO TEST THE BLOOD SUGAR THREE TIMES A DAY, Disp: 300 each, Rfl: 10    glucose blood (FREESTYLE LITE) test strip, Use as instructed, Disp: 100 strip, Rfl: 0    insulin aspart (NovoLOG FlexPen) 100 UNIT/ML injection pen, Inject 6 Units under the skin 3 (three) times a day with meals, Disp: 15 mL, Rfl: 0    insulin glargine (Toujeo Max SoloStar) 300 units/mL CONCENTRATED U-300 injection pen (2-unit dial), Inject 22 Units under the skin daily (Patient taking differently: Inject 22 Units under the skin daily at bedtime), Disp: 9 mL, Rfl: 0    Insulin Pen Needle (Pen Needles) 31G X 8 MM MISC, Inject under the skin 4 (four) times a day (with meals and at bedtime), Disp: 100 each, Rfl: 11    lidocaine (LMX) 4 % cream, Apply topically as needed for mild pain, Disp: 30 g, Rfl: 0    lidocaine (XYLOCAINE) 5 % ointment, APPLY TOPICALLY 2GM TWICE A DAY TO AFFECTED AREAS AS NEEDED FOR MILD PAIN (Patient taking differently: Lidocaine patches), Disp: 250 g, Rfl: 8    Linzess 72 MCG CAPS, TAKE ONE CAPSULE BY MOUTH ONCE DAILY, Disp: 90 capsule, Rfl: 10    methocarbamol (ROBAXIN) 500 mg tablet, Take 1 tablet (500 mg total) by mouth 2 (two) times a day, Disp: 20 tablet, Rfl: 0    naloxone (NARCAN) 4 mg/0 1 mL nasal spray, Administer 1 spray into a nostril  If no response after 2-3 minutes, give another dose in the other nostril using a new spray , Disp: 1 each, Rfl: 1    NON FORMULARY, [facility], Disp: , Rfl:     ondansetron (ZOFRAN) 8 mg tablet, Take 1 tablet (8 mg total) by mouth every 8 (eight) hours as needed for nausea or vomiting, Disp: 30 tablet, Rfl: 2    oxyCODONE (Roxicodone) 15 mg immediate release tablet, Take 1 tablet (15 mg total) by mouth every 4 (four) hours as needed for moderate pain Max Daily Amount: 90 mg, Disp: 180 tablet, Rfl: 0    pantoprazole (PROTONIX) 40 mg tablet, Take 1 tablet (40 mg total) by mouth 2 (two) times a day before meals, Disp: 180 tablet, Rfl: 1    promethazine (PHENERGAN) 25 mg tablet, Take 1 tablet (25 mg total) by mouth every 6 (six) hours as needed for nausea or vomiting, Disp: 60 tablet, Rfl: 3    Restasis 0 05 % ophthalmic emulsion, , Disp: , Rfl:     scopolamine (TRANSDERM-SCOP) 1 5 mg/3 days TD 72 hr patch, Place 1 patch on the skin every third day, Disp: 10 patch, Rfl: 0    sucralfate (CARAFATE) 1 g/10 mL suspension, TAKE 10ML BY MOUTH THREE TIMES A DAY, Disp: 500 mL, Rfl: 2    sucralfate (CARAFATE) 1 g/10 mL suspension, Take 10 mL (1 g total) by mouth in the morning and 10 mL (1 g total) at noon and 10 mL (1 g total) in the evening and 10 mL (1 g total) before bedtime  Do all this for 7 days  , Disp: 420 mL, Rfl: 0    Allergies   Allergen Reactions    Prozac [Fluoxetine Hcl]      SI    Bactrim [Sulfamethoxazole-Trimethoprim]      Pt "They think that is what cause the pancreatitis"     Flagyl [Metronidazole] Diarrhea and Abdominal Pain    Lamictal [Lamotrigine] GI Intolerance    Lithium Other (See Comments)    Haldol [Haloperidol] Other (See Comments)     "I don't like it"    Ibuprofen     Lexapro [Escitalopram Oxalate] Rash    Navane [Thiothixene]      SI    Other      "novaine?" antipsychotic       Physical Exam:    /76   Pulse (!) 47   Ht 5' 2" (1 575 m)   Wt 64 9 kg (143 lb)   LMP  (LMP Unknown)   BMI 26 16 kg/m²     Constitutional:normal, well developed, well nourished, alert, in no distress and non-toxic and no overt pain behavior    Eyes:anicteric  HEENT:grossly intact  Neck:supple, symmetric, trachea midline and no masses   Pulmonary:even and unlabored  Cardiovascular:No edema or pitting edema present  Skin:Normal without rashes or lesions and well hydrated  Psychiatric:Mood and affect appropriate  Neurologic:Cranial Nerves II-XII grossly intact  Musculoskeletal:in wheelchair      Imaging  No orders to display         Orders Placed This Encounter   Procedures    Ambulatory Referral to Rheumatology

## 2022-07-21 NOTE — TELEPHONE ENCOUNTER
rec'd phone call from patient stating she was recently to her PCP and her pain mgmt doctors who advised her to call our office regarding recent labwork she's had done  Patient states that she has "not been feeling well" for a few weeks and was becoming tearful on the phone with me  States that her "bones hurt" - she's not eating/drinking much, with decreased urination and increased nausea and fatigue  Pt complains that she just "hasn't been able to do much of anything lately " Patient would like her labs reviewed and called with any suggestions  Is already scheduled for a follow up with [facility] on 8/4

## 2022-07-26 ENCOUNTER — HOSPITAL ENCOUNTER (EMERGENCY)
Facility: HOSPITAL | Age: 52
Discharge: HOME | End: 2022-07-27
Attending: EMERGENCY MEDICINE
Payer: MEDICARE

## 2022-07-26 ENCOUNTER — APPOINTMENT (EMERGENCY)
Dept: CT IMAGING | Facility: HOSPITAL | Age: 52
End: 2022-07-26
Payer: MEDICARE

## 2022-07-26 DIAGNOSIS — R07.89 BURNING CHEST PAIN: ICD-10-CM

## 2022-07-26 DIAGNOSIS — I10 ELEVATED SYSTOLIC BLOOD PRESSURE READING WITH DIAGNOSIS OF HYPERTENSION: ICD-10-CM

## 2022-07-26 DIAGNOSIS — R51.9 ACUTE NONINTRACTABLE HEADACHE, UNSPECIFIED HEADACHE TYPE: Primary | ICD-10-CM

## 2022-07-26 LAB
C-ANCA TITR SER IF: NORMAL TITER
MYELOPEROXIDASE AB SER IA-ACNC: 0.5 UNITS (ref 0–0.9)
P-ANCA ATYPICAL TITR SER IF: NORMAL TITER
P-ANCA TITR SER IF: NORMAL TITER
PROTEINASE3 AB SER IA-ACNC: <0.2 UNITS (ref 0–0.9)

## 2022-07-26 PROCEDURE — 70450 CT HEAD/BRAIN W/O DYE: CPT

## 2022-07-26 PROCEDURE — 99284 EMERGENCY DEPT VISIT MOD MDM: CPT

## 2022-07-26 PROCEDURE — G1004 CDSM NDSC: HCPCS

## 2022-07-26 PROCEDURE — 99285 EMERGENCY DEPT VISIT HI MDM: CPT | Performed by: PHYSICIAN ASSISTANT

## 2022-07-26 RX ORDER — ACETAMINOPHEN 325 MG/1
650 TABLET ORAL ONCE
Status: COMPLETED | OUTPATIENT
Start: 2022-07-26 | End: 2022-07-27

## 2022-07-26 RX ORDER — OLANZAPINE 5 MG/1
5 TABLET, ORALLY DISINTEGRATING ORAL ONCE
Status: COMPLETED | OUTPATIENT
Start: 2022-07-26 | End: 2022-07-27

## 2022-07-27 ENCOUNTER — OFFICE VISIT (OUTPATIENT)
Dept: FAMILY MEDICINE CLINIC | Facility: CLINIC | Age: 52
End: 2022-07-27

## 2022-07-27 ENCOUNTER — APPOINTMENT (EMERGENCY)
Dept: RADIOLOGY | Facility: HOSPITAL | Age: 52
End: 2022-07-27
Payer: MEDICARE

## 2022-07-27 VITALS
DIASTOLIC BLOOD PRESSURE: 100 MMHG | RESPIRATION RATE: 18 BRPM | BODY MASS INDEX: 26.31 KG/M2 | HEART RATE: 81 BPM | SYSTOLIC BLOOD PRESSURE: 177 MMHG | TEMPERATURE: 97.6 F | OXYGEN SATURATION: 99 % | WEIGHT: 143 LBS | HEIGHT: 62 IN

## 2022-07-27 VITALS
DIASTOLIC BLOOD PRESSURE: 82 MMHG | BODY MASS INDEX: 26.13 KG/M2 | OXYGEN SATURATION: 98 % | HEIGHT: 62 IN | HEART RATE: 65 BPM | RESPIRATION RATE: 18 BRPM | SYSTOLIC BLOOD PRESSURE: 126 MMHG | WEIGHT: 142 LBS | TEMPERATURE: 97.6 F

## 2022-07-27 DIAGNOSIS — I10 BENIGN ESSENTIAL HTN: ICD-10-CM

## 2022-07-27 DIAGNOSIS — M31.7 MPA (MICROSCOPIC POLYANGIITIS) (HCC): Chronic | ICD-10-CM

## 2022-07-27 DIAGNOSIS — F11.90 CHRONIC NARCOTIC USE: Primary | ICD-10-CM

## 2022-07-27 DIAGNOSIS — M79.642 BILATERAL HAND PAIN: ICD-10-CM

## 2022-07-27 DIAGNOSIS — G89.4 CHRONIC PAIN SYNDROME: Chronic | ICD-10-CM

## 2022-07-27 DIAGNOSIS — M79.641 BILATERAL HAND PAIN: ICD-10-CM

## 2022-07-27 LAB
2HR DELTA HS TROPONIN: -3 NG/L
ALBUMIN SERPL BCP-MCNC: 3.7 G/DL (ref 3.5–5)
ALP SERPL-CCNC: 116 U/L (ref 46–116)
ALT SERPL W P-5'-P-CCNC: 23 U/L (ref 12–78)
ANION GAP SERPL CALCULATED.3IONS-SCNC: 12 MMOL/L (ref 4–13)
AST SERPL W P-5'-P-CCNC: 21 U/L (ref 5–45)
BASOPHILS # BLD AUTO: 0.03 THOUSANDS/ΜL (ref 0–0.1)
BASOPHILS NFR BLD AUTO: 0 % (ref 0–1)
BILIRUB SERPL-MCNC: 0.32 MG/DL (ref 0.2–1)
BUN SERPL-MCNC: 33 MG/DL (ref 5–25)
CALCIUM SERPL-MCNC: 8.9 MG/DL (ref 8.3–10.1)
CARDIAC TROPONIN I PNL SERPL HS: 18 NG/L
CARDIAC TROPONIN I PNL SERPL HS: 21 NG/L
CHLORIDE SERPL-SCNC: 105 MMOL/L (ref 96–108)
CO2 SERPL-SCNC: 25 MMOL/L (ref 21–32)
CREAT SERPL-MCNC: 1.94 MG/DL (ref 0.6–1.3)
EOSINOPHIL # BLD AUTO: 0.02 THOUSAND/ΜL (ref 0–0.61)
EOSINOPHIL NFR BLD AUTO: 0 % (ref 0–6)
ERYTHROCYTE [DISTWIDTH] IN BLOOD BY AUTOMATED COUNT: 13.2 % (ref 11.6–15.1)
GFR SERPL CREATININE-BSD FRML MDRD: 29 ML/MIN/1.73SQ M
GLUCOSE SERPL-MCNC: 108 MG/DL (ref 65–140)
HCT VFR BLD AUTO: 39.3 % (ref 34.8–46.1)
HGB BLD-MCNC: 12.9 G/DL (ref 11.5–15.4)
IMM GRANULOCYTES # BLD AUTO: 0.02 THOUSAND/UL (ref 0–0.2)
IMM GRANULOCYTES NFR BLD AUTO: 0 % (ref 0–2)
LYMPHOCYTES # BLD AUTO: 1.13 THOUSANDS/ΜL (ref 0.6–4.47)
LYMPHOCYTES NFR BLD AUTO: 16 % (ref 14–44)
MCH RBC QN AUTO: 30.3 PG (ref 26.8–34.3)
MCHC RBC AUTO-ENTMCNC: 32.8 G/DL (ref 31.4–37.4)
MCV RBC AUTO: 92 FL (ref 82–98)
MONOCYTES # BLD AUTO: 0.54 THOUSAND/ΜL (ref 0.17–1.22)
MONOCYTES NFR BLD AUTO: 8 % (ref 4–12)
NEUTROPHILS # BLD AUTO: 5.44 THOUSANDS/ΜL (ref 1.85–7.62)
NEUTS SEG NFR BLD AUTO: 76 % (ref 43–75)
NRBC BLD AUTO-RTO: 0 /100 WBCS
PLATELET # BLD AUTO: 207 THOUSANDS/UL (ref 149–390)
PMV BLD AUTO: 10.8 FL (ref 8.9–12.7)
POTASSIUM SERPL-SCNC: 4.1 MMOL/L (ref 3.5–5.3)
PROT SERPL-MCNC: 8.1 G/DL (ref 6.4–8.4)
RBC # BLD AUTO: 4.26 MILLION/UL (ref 3.81–5.12)
SODIUM SERPL-SCNC: 142 MMOL/L (ref 135–147)
WBC # BLD AUTO: 7.18 THOUSAND/UL (ref 4.31–10.16)

## 2022-07-27 PROCEDURE — 84484 ASSAY OF TROPONIN QUANT: CPT | Performed by: PHYSICIAN ASSISTANT

## 2022-07-27 PROCEDURE — 80053 COMPREHEN METABOLIC PANEL: CPT | Performed by: PHYSICIAN ASSISTANT

## 2022-07-27 PROCEDURE — 80307 DRUG TEST PRSMV CHEM ANLYZR: CPT | Performed by: PHYSICIAN ASSISTANT

## 2022-07-27 PROCEDURE — 99214 OFFICE O/P EST MOD 30 MIN: CPT | Performed by: PHYSICIAN ASSISTANT

## 2022-07-27 PROCEDURE — 96360 HYDRATION IV INFUSION INIT: CPT

## 2022-07-27 PROCEDURE — 3079F DIAST BP 80-89 MM HG: CPT | Performed by: PHYSICIAN ASSISTANT

## 2022-07-27 PROCEDURE — 85025 COMPLETE CBC W/AUTO DIFF WBC: CPT | Performed by: PHYSICIAN ASSISTANT

## 2022-07-27 PROCEDURE — 80365 DRUG SCREENING OXYCODONE: CPT | Performed by: PHYSICIAN ASSISTANT

## 2022-07-27 PROCEDURE — 36415 COLL VENOUS BLD VENIPUNCTURE: CPT | Performed by: PHYSICIAN ASSISTANT

## 2022-07-27 PROCEDURE — 3074F SYST BP LT 130 MM HG: CPT | Performed by: PHYSICIAN ASSISTANT

## 2022-07-27 PROCEDURE — 71045 X-RAY EXAM CHEST 1 VIEW: CPT

## 2022-07-27 RX ORDER — AMLODIPINE BESYLATE 5 MG/1
5 TABLET ORAL ONCE
Status: COMPLETED | OUTPATIENT
Start: 2022-07-27 | End: 2022-07-27

## 2022-07-27 RX ADMIN — AMLODIPINE BESYLATE 5 MG: 5 TABLET ORAL at 01:32

## 2022-07-27 RX ADMIN — SODIUM CHLORIDE 1000 ML: 0.9 INJECTION, SOLUTION INTRAVENOUS at 00:25

## 2022-07-27 RX ADMIN — OLANZAPINE 5 MG: 5 TABLET, ORALLY DISINTEGRATING ORAL at 00:13

## 2022-07-27 RX ADMIN — ACETAMINOPHEN 650 MG: 325 TABLET, FILM COATED ORAL at 00:13

## 2022-07-27 NOTE — ED NOTES
Patient removed her own IV stating she is ready to go and needs to leave - advised patient she should let staff remove it next time and gave patient a bandage     [address]  07/27/22 0719

## 2022-07-27 NOTE — DISCHARGE INSTRUCTIONS
You were seen in the emergency department today for headache  Laboratory analysis, EKG, and Radiologic imaging did not reveal any acute abnormality  Please follow-up with your primary care physician and Cardiology regarding your symptoms  Please return to the emergency department with any worsening symptoms including but not limited to: fevers, dizziness, chest pain, shortness of breath, palpitations, loss of consciousness, abdominal pain, nausea, vomiting, diarrhea, or lower extremity changes  CT scan incidental findings:  No acute intracranial abnormality  Right TMJ with suboptimal line mint, differential includes subluxation versus dislocation  No clinical findings to support this  Follow-up PCP  Recommend compliance with [facility]  Follow-up cardiology regarding chest pain   Follow-up PCP regarding  Headache and TMJ

## 2022-07-27 NOTE — ED NOTES
Patient laying in litter playing on phone  Resting comfortably       Swathi Monahan RN  07/27/22 [id number]

## 2022-07-27 NOTE — LETTER
July 27, 2022     Patient: Laurie Storey  YOB: 1970  Date of Visit: 7/27/2022      To Whom it May Concern:    Laurie Storey is under my professional care  Rhiannon Chávez was seen in my office on 7/27/2022  Patient has known past medical history of gastroparesis, GERD, constipation, ADHD, type 2 diabetes mellitus, cataplexy, Wegener's granulomatosis, small fiber neuropathy, bipolar 1 disorder  Patient is wheel chair bound  Due to these medical conditions, patient requires to have 24 hours a day/7 days a week home health services  Patient cannot perform any activities of daily living on her own including dressing, bathing, cooking  Patient requires assistance to transfer in and out of her bed and chairs  If you have any questions or concerns, please don't hesitate to call           Sincerely,          Katarzyna Thrasher PA-C        CC: No Recipients

## 2022-07-27 NOTE — PROGRESS NOTES
Assessment/Plan:    Chronic pain  - Continue oxycodone 15 mg, every 4 hours as needed  PDMP reviewed  No red flags noted  - Updated UDS collected today  - Continue Lyrica 100 mg 3 times daily as prescribed through pain management  - Continue follow-up with pain management as scheduled  - Due to increased total body pain and fatigue, will order additional blood work  - Patient is scheduled to establish with Rheumatology in December 2022  Benign essential HTN  - Patient has not been taking amlodipine for the past few weeks because her blood pressure has been on the lower side  Patient believes her blood pressure was only elevated yesterday secondary to the increased stress   - Advised to check blood pressure daily and to restart taking amlodipine 5 mg daily if blood pressure is >140/90  MPA (microscopic polyangiitis) (HCC)  - Originally diagnosed in 2015  Currently in remission  Previously on combination plasmapheresis, steroids, Cytoxan  Patient was then briefly on imuran until 2017  - Last seen by Rheumatology in February 2018, was then lost to follow up  - Patient is scheduled to re-establish with Rheumatology in December 2022  Bilateral hand pain  - Will obtain x-ray of bilateral hands for further evaluation  Diagnoses and all orders for this visit:    Chronic narcotic use  -     Toxicology screen, urine  -     Oxycodone/Oxymorphone urine    Chronic pain syndrome  -     C-reactive protein; Future  -     Sedimentation rate, automated; Future  -     ANCA Screen With MPO and PR3 With Reflex To ANCA Titer; Future  -     RF Screen w/ Reflex to Titer; Future  -     BAM Screen w/ Reflex to Titer/Pattern; Future  -     Lupus Anticoagulant Panel; Future    Benign essential HTN    MPA (microscopic polyangiitis) (HCC)  -     C-reactive protein; Future  -     Sedimentation rate, automated; Future  -     ANCA Screen With MPO and PR3 With Reflex To ANCA Titer;  Future  -     RF Screen w/ Reflex to Titer; Future  -     BAM Screen w/ Reflex to Titer/Pattern; Future  -     Lupus Anticoagulant Panel; Future    Bilateral hand pain  -     XR hand 3+ vw right; Future  -     XR hand 3+ vw left; Future          All of patients questions were answered  Patient understands and agrees with the above plan  Return in about 4 weeks (around 8/24/2022) for Next scheduled follow up bone pain  Wilfred Henriquez PA-C  07/27/22  [facility]          Subjective:     Patient ID: Iza Alex  is a 46 y o  female with known PHM of gastroparesis, GERD, constipation, ADHD, type 2 diabetes mellitus, cataplexy, Wegener's granulomatosis, small fiber neuropathy, bipolar 1 disorder  who presents today in office for follow up  - Patient is a 46 y o  female who presents today for follow up  Patient had presented to [facility] on 07/26/2020 due secondary to headache  Patient notes around 5-6 o'clock, she had an emotional phone call which caused her to experience increased stress  Patient notes since then she had the headache  CT head without contrast revealed no acute intracranial abnormality  EKG showed normal sinus rhythm at 62 beats per minute  Patient was treated symptomatically and discharged home  Today, patient notes the high blood pressure and headache was secondary to the increased stress  Patient notes there is 1 specific person that she has to cut out of her life which was significantly decrease her stress  - Patient notes for the past 3 weeks she has been experiencing increased bone pain  Patient notes her entire body hurts and she is very sensitive to the cold  Patient notes the nausea vomiting has decreased  Recently, patient notes she is experiencing bilateral hand pain  Patient notes she is scheduled to establish with Rheumatology in December, but she cannot wait that long     - Patient has history of ANCA-assocaited vasculitis with positive anti-GBM in 2015   Patient was following with Rheumatology  Patient notes she was previously taking prednisone on a daily basis, but it was then discontinued due to side effects including weight gain, buffalo hump, Cushing syndrome  Patient was then started on imuran  Patient then self-discontinued her medications in 2017 because she did not want to take a lot of medications  Since then, patient has been off of immunosuppressive therapy  Patient was last seen by Rheumatology in February 2018 but was then lost to follow up  The following portions of the patient's history were reviewed and updated as appropriate: allergies, current medications, past family history, past medical history, past social history, past surgical history and problem list         Review of Systems   Constitutional: Negative for chills and fever  HENT: Negative for congestion and sore throat  Respiratory: Negative for cough, chest tightness and shortness of breath  Cardiovascular: Negative for chest pain and leg swelling  Gastrointestinal: Positive for constipation, nausea and vomiting  Negative for abdominal pain and diarrhea  Genitourinary: Negative for dysuria  Musculoskeletal: Positive for arthralgias, back pain, myalgias and neck pain  Neurological: Positive for numbness and headaches  Negative for dizziness  Psychiatric/Behavioral: Negative for behavioral problems, self-injury and suicidal ideas  Objective:   Vitals:    07/27/22 0815   BP: 126/82   BP Location: Left arm   Patient Position: Sitting   Cuff Size: Adult   Pulse: 65   Resp: 18   Temp: 97 6 °F (36 4 °C)   TempSrc: Temporal   SpO2: 98%   Weight: 64 4 kg (142 lb)   Height: 5' 2" (1 575 m)         Physical Exam  Vitals and nursing note reviewed  Constitutional:       General: She is not in acute distress  Appearance: She is well-developed  Comments: Examined in wheelchair   HENT:      Head: Normocephalic and atraumatic        Right Ear: External ear normal  Left Ear: External ear normal       Nose: Nose normal    Eyes:      Conjunctiva/sclera: Conjunctivae normal    Cardiovascular:      Rate and Rhythm: Normal rate and regular rhythm  Pulses: Normal pulses  Heart sounds: Normal heart sounds  Pulmonary:      Effort: Pulmonary effort is normal  No respiratory distress  Breath sounds: Normal breath sounds  No wheezing  Abdominal:      General: Bowel sounds are normal       Palpations: Abdomen is soft  Tenderness: There is no abdominal tenderness  Musculoskeletal:      Cervical back: Normal range of motion and neck supple  Skin:     General: Skin is warm and dry  Neurological:      Mental Status: She is alert and oriented to person, place, and time     Psychiatric:         Behavior: Behavior normal

## 2022-07-27 NOTE — ED PROVIDER NOTES
History  Chief Complaint   Patient presents with    Headache     Pt reports a headache that started at 5 o'clock after an "emotional phone call"  Pain described described as sharp, with nausea and vomiting  EMS reports a blood pressure of 160/110  Pt reports history of high blood pressure, but states she has not been taking her medications due to it being on the "lower side"  Yina Ibarra is a 46 y o   female with PMH of diabetes, hypertension, anemia, asthma, bipolar, Justa's who presents to the emergency department with reported headache  Patient states that approximately 5-6 o'clock she had emotional phone call  She states ever since she has had a headache  She cannot clarify if this was sudden onset but noticed that it has been constant ever since  Described as moderate type headache  Is unsure if this is like previous headaches  Does note some associated nausea  No photophobia or phonophobia  No dizziness, lightheadedness, vision changes, neck stiffness  No recent trauma  Patient states that she also has some chest wall burning  No shortness of breath palpitations dizziness lightheadedness or diaphoresis  She states she feels like this is all from the emotional phone call  No GI symptoms  No lower extremity swelling  On arrival patient was noted to be hypertensive, notes that she has not been taking her Norvasc daily  Will supply dose here              History provided by:  Patient   used: No    Headache  Pain location:  Generalized  Quality:  Dull  Radiates to:  Does not radiate  Severity currently:  4/10  Severity at highest:  4/10  Onset quality:  Unable to specify  Duration:  6 hours  Timing:  Constant  Progression:  Unchanged  Chronicity:  New  Context: emotional stress    Context: not activity, not exposure to bright light, not caffeine, not coughing, not defecating, not eating, not exposure to cold air, not intercourse, not loud noise and not straining Relieved by:  None tried  Worsened by:  Nothing  Ineffective treatments:  None tried  Associated symptoms: no abdominal pain, no back pain, no blurred vision, no congestion, no cough, no diarrhea, no dizziness, no drainage, no ear pain, no eye pain, no facial pain, no fatigue, no fever, no focal weakness, no hearing loss, no loss of balance, no myalgias, no nausea, no near-syncope, no neck pain, no neck stiffness, no numbness, no paresthesias, no photophobia, no seizures, no sinus pressure, no sore throat, no swollen glands, no syncope, no tingling, no URI, no visual change, no vomiting and no weakness        Prior to Admission Medications   Prescriptions Last Dose Informant Patient Reported? Taking?    Alcohol Swabs (Alcohol Pads) 70 % PADS   No No   Sig: Use 4 (four) times a day   Blood Glucose Monitoring Suppl (FreeStyle Lite) DEIRDRE   No No   Sig: Use daily   Diclofenac Sodium (VOLTAREN) 1 %   No No   Sig: Apply 2 g topically 4 (four) times a day   Diclofenac Sodium (VOLTAREN) 1 %   No No   Sig: Apply 2 g topically 4 (four) times a day   Easy Comfort Lancets MISC   No No   Sig: USE TO TEST THE BLOOD SUGAR THREE TIMES A DAY   Insulin Pen Needle (Pen Needles) 31G X 8 MM MISC   No No   Sig: Inject under the skin 4 (four) times a day (with meals and at bedtime)   Linzess 72 MCG CAPS   No No   Sig: TAKE ONE CAPSULE BY MOUTH ONCE DAILY   NON FORMULARY   Yes No   Sig: Medical [facility]   Restasis 0 05 % ophthalmic emulsion   Yes No   acetaminophen (TYLENOL) 500 mg tablet   No No   Sig: Take 2 tablets (1,000 mg total) by mouth every 8 (eight) hours as needed for mild pain   albuterol (2 5 mg/3 mL) 0 083 % nebulizer solution   No No   Sig: USE 1 VIAL VIA NEBULIZER EVERY 6 HOURS AS NEEDED FOR WHEEZING OR SHORTNESS OF BREATH   albuterol (Ventolin HFA) 90 mcg/act inhaler   No No   Sig: Inhale 2 puffs every 6 (six) hours as needed for wheezing or shortness of breath   amLODIPine (NORVASC) 5 mg tablet   No No   Sig: TAKE ONE TABLET BY MOUTH ONCE DAILY   Patient not taking: Reported on 7/5/2022   amphetamine-dextroamphetamine (ADDERALL XR) 20 MG 24 hr capsule   No No   Sig: Take 1 capsule (20 mg total) by mouth 2 (two) times a day Max Daily Amount: 40 mg   buPROPion (WELLBUTRIN XL) 300 mg 24 hr tablet   No No   Sig: Take 1 tablet (300 mg total) by mouth every morning   budesonide-formoterol (Symbicort) 80-4 5 MCG/ACT inhaler   No No   Sig: Inhale 2 puffs 2 (two) times a day Rinse mouth after use  clobetasol (TEMOVATE) 0 05 % cream   No No   Sig: APPLY TOPICALLY TO AFFECTED AREA TWICE A DAY   dicyclomine (BENTYL) 20 mg tablet   No No   Sig: Take 1 tablet (20 mg total) by mouth in the morning and 1 tablet (20 mg total) in the evening  Do all this for 7 days  glucose blood (FREESTYLE LITE) test strip   No No   Sig: Use as instructed   insulin aspart (NovoLOG FlexPen) 100 UNIT/ML injection pen   No No   Sig: Inject 6 Units under the skin 3 (three) times a day with meals   insulin glargine (Toujeo Max SoloStar) 300 units/mL CONCENTRATED U-300 injection pen (2-unit dial)   No No   Sig: Inject 22 Units under the skin daily   Patient taking differently: Inject 22 Units under the skin daily at bedtime   lidocaine (LMX) 4 % cream   No No   Sig: Apply topically as needed for mild pain   lidocaine (XYLOCAINE) 5 % ointment   No No   Sig: APPLY TOPICALLY 2GM TWICE A DAY TO AFFECTED AREAS AS NEEDED FOR MILD PAIN   Patient taking differently: Lidocaine patches   methocarbamol (ROBAXIN) 500 mg tablet   No No   Sig: Take 1 tablet (500 mg total) by mouth 2 (two) times a day   naloxone (NARCAN) 4 mg/0 1 mL nasal spray   No No   Sig: Administer 1 spray into a nostril  If no response after 2-3 minutes, give another dose in the other nostril using a new spray     ondansetron (ZOFRAN) 8 mg tablet   No No   Sig: Take 1 tablet (8 mg total) by mouth every 8 (eight) hours as needed for nausea or vomiting   oxyCODONE (Roxicodone) 15 mg immediate release tablet No No   Sig: Take 1 tablet (15 mg total) by mouth every 4 (four) hours as needed for moderate pain Max Daily Amount: 90 mg   pantoprazole (PROTONIX) 40 mg tablet   No No   Sig: Take 1 tablet (40 mg total) by mouth 2 (two) times a day before meals   pregabalin (LYRICA) 100 mg capsule   No No   Sig: Take 1 capsule (100 mg total) by mouth 3 (three) times a day   promethazine (PHENERGAN) 25 mg tablet   No No   Sig: Take 1 tablet (25 mg total) by mouth every 6 (six) hours as needed for nausea or vomiting   scopolamine (TRANSDERM-SCOP) 1 5 mg/3 days TD 72 hr patch   No No   Sig: Place 1 patch on the skin every third day   sucralfate (CARAFATE) 1 g/10 mL suspension   No No   Sig: TAKE 10ML BY MOUTH THREE TIMES A DAY   sucralfate (CARAFATE) 1 g/10 mL suspension   No No   Sig: Take 10 mL (1 g total) by mouth in the morning and 10 mL (1 g total) at noon and 10 mL (1 g total) in the evening and 10 mL (1 g total) before bedtime  Do all this for 7 days  Facility-Administered Medications: None       Past Medical History:   Diagnosis Date    ADHD     Anemia of chronic disease     Anxiety     Arthritis ?  Asthma     B[facility]     Borderline personality disorder [facility]     Cataplexy     Chronic abdominal pain     Chronic kidney disease ?  CKD (chronic kidney disease) stage 3, GFR 30-59 ml/min (HCC)     Cushing syndrome (HCC)     Depression ?     Diabetes mellitus [facility]     DVT (deep venous thrombosis) (HCC)     GERD (gastroesophageal reflux disease)     Headache(784 0) 3 months    History of acute pancreatitis     felt secondary to Bactrim    History of transfusion     Hypertension     Liver disease     fatty liver    Microscopic polyangiitis (HCC)     Ovarian cyst     PTSD (post-traumatic stress disorder)     Self-inflicted injury     self inflicted skin wounds    Sleep apnea     Wegener's granulomatosis with renal involvement ([facility] ) 2015       Past Surgical History:   Procedure Laterality Date    ESOPHAGOGASTRODUODENOSCOPY  2015    mild antral gastritis    GASTRIC STIMULATOR IMPLANT SURGERY  2020    OK COLONOSCOPY FLX DX W/COLLJ SPEC WHEN PFRMD N/A 2018    adenoma removed from the transverse, hyperplastic polyp removed from the left colon    OK ESOPHAGOGASTRODUODENOSCOPY TRANSORAL DIAGNOSTIC N/A 2018    gastritis and scant coffee-ground material   Biopsies negative for H  pylori    OK OPEN TX RADIAL & ULNAR SHAFT FX FIX RADIUS AND ULNA Left 2022    Procedure: OPEN REDUCTION W/ INTERNAL FIXATION (ORIF) RADIUS / ULNA (WRIST); Surgeon: Ashley Will MD;  [facility]: [facility];  Service: Orthopedics    RELEASE SCAR CONTRACTURE / GRAFT REPAIRS OF HAND Bilateral     UPPER GASTROINTESTINAL ENDOSCOPY  2019    Dr Willie Ortiz  Botox to the pylorus       Family History   Problem Relation Age of Onset    Arthritis Mother     Depression Mother     Diabetes Mother     Mental illness Mother    Mavis Cousin Migraines Mother     No Known Problems Father     Colon cancer Neg Hx     Drug abuse Neg Hx         mother father    Mental illness Neg Hx         disorder, mother father    Cancer Neg Hx     Breast cancer Neg Hx      I have reviewed and agree with the history as documented      E-Cigarette/Vaping    E-Cigarette Use Never User      E-Cigarette/Vaping Substances    Nicotine No     THC No     CBD No     Flavoring No     Other No     Unknown No      Social History     Tobacco Use    Smoking status: Former Smoker     Packs/day: 1 00     Years: 10      Pack years: 10 00     Types: Cigarettes     Quit date: 2011     Years since quittin 5    Smokeless tobacco: Never Used    Tobacco comment: Stopped smoking 11 years ago   Vaping Use    Vaping Use: Never used   Substance Use Topics    Alcohol use: Never    Drug use: Yes     Types: Marijuana     Comment: marijuana daily       Review of Systems   Constitutional: Negative for activity change, appetite change, chills, diaphoresis, fatigue, fever and unexpected weight change  HENT: Negative for congestion, dental problem, ear pain, hearing loss, mouth sores, nosebleeds, postnasal drip, sinus pressure, sinus pain, sneezing, sore throat and trouble swallowing  Eyes: Negative for blurred vision, photophobia and pain  Respiratory: Positive for chest tightness  Negative for apnea, cough, choking, shortness of breath, wheezing and stridor  Cardiovascular: Positive for chest pain  Negative for palpitations, leg swelling, syncope and near-syncope  Gastrointestinal: Negative for abdominal pain, constipation, diarrhea, nausea and vomiting  Genitourinary: Negative for dysuria, flank pain, frequency and urgency  Musculoskeletal: Negative for arthralgias, back pain, joint swelling, myalgias, neck pain and neck stiffness  Skin: Negative for color change, pallor and rash  Neurological: Positive for headaches  Negative for dizziness, tremors, focal weakness, seizures, syncope, speech difficulty, weakness, light-headedness, numbness, paresthesias and loss of balance  All other systems reviewed and are negative  Physical Exam  Physical Exam  Vitals and nursing note reviewed  Constitutional:       General: She is not in acute distress  Appearance: Normal appearance  She is normal weight  She is not ill-appearing or toxic-appearing  Comments: Tearful   HENT:      Head: Normocephalic and atraumatic  Mouth/Throat:      Mouth: Mucous membranes are moist       Pharynx: Oropharynx is clear  Eyes:      Extraocular Movements: Extraocular movements intact  Pupils: Pupils are equal, round, and reactive to light  Cardiovascular:      Rate and Rhythm: Normal rate and regular rhythm  Pulses: Normal pulses  Heart sounds: Normal heart sounds  No murmur heard  No friction rub  No gallop  Pulmonary:      Effort: Pulmonary effort is normal  No respiratory distress        Breath sounds: Normal breath sounds  No stridor  No wheezing, rhonchi or rales  Abdominal:      General: Abdomen is flat  Bowel sounds are normal  There is no distension  Palpations: Abdomen is soft  There is no mass  Tenderness: There is no abdominal tenderness  There is no guarding or rebound  Hernia: No hernia is present  Musculoskeletal:         General: No swelling, tenderness, deformity or signs of injury  Normal range of motion  Cervical back: Normal range of motion  No rigidity or tenderness  Right lower leg: No edema  Left lower leg: No edema  Skin:     General: Skin is warm and dry  Capillary Refill: Capillary refill takes less than 2 seconds  Neurological:      General: No focal deficit present  Mental Status: She is alert and oriented to person, place, and time  Mental status is at baseline  Cranial Nerves: No cranial nerve deficit  Sensory: No sensory deficit  Motor: No weakness  Coordination: Coordination normal       Comments: GCS 15  CN 2-12 intact  PERRLA  Bilateral upper and lower extremities have 5/5 strength and sensation is intact  Finger to Nose and Heel to shin are intact   Speech normal            Vital Signs  ED Triage Vitals   Temperature Pulse Respirations Blood Pressure SpO2   07/27/22 0038 07/26/22 2308 07/26/22 2300 07/26/22 2300 07/26/22 2300   97 6 °F (36 4 °C) 64 18 (!) 206/104 94 %      Temp Source Heart Rate Source Patient Position - Orthostatic VS BP Location FiO2 (%)   07/27/22 0038 -- 07/26/22 2300 07/26/22 2300 --   Oral  Lying Right arm       Pain Score       07/26/22 2300       10 - Worst Possible Pain           Vitals:    07/26/22 2308 07/27/22 0038 07/27/22 0132 07/27/22 0245   BP: (!) 186/108 (!) 197/100 (!) 192/105 146/78   Pulse: 64  76 72   Patient Position - Orthostatic VS:   Lying          Visual Acuity      ED Medications  Medications   acetaminophen (TYLENOL) tablet 650 mg (650 mg Oral Given 7/27/22 0013)   sodium chloride 0 9 % bolus 1,000 mL (0 mL Intravenous Stopped 7/27/22 0125)   OLANZapine (ZyPREXA ZYDIS) dispersible tablet 5 mg (5 mg Oral Given 7/27/22 0013)   amLODIPine (NORVASC) tablet 5 mg (5 mg Oral Given 7/27/22 0132)       Diagnostic Studies  Results Reviewed     Procedure Component Value Units Date/Time    HS Troponin I 2hr [[id number]]  (Normal) Collected: 07/27/22 0226    Lab Status: Final result Specimen: Blood from Arm, Right Updated: 07/27/22 0313     hs TnI 2hr 18 ng/L      Delta 2hr hsTnI -3 ng/L     Comprehensive metabolic panel [[id number]]  (Abnormal) Collected: 07/27/22 0025    Lab Status: Final result Specimen: Blood from Arm, Right Updated: 07/27/22 0109     Sodium 142 mmol/L      Potassium 4 1 mmol/L      Chloride 105 mmol/L      CO2 25 mmol/L      ANION GAP 12 mmol/L      BUN 33 mg/dL      Creatinine 1 94 mg/dL      Glucose 108 mg/dL      Calcium 8 9 mg/dL      AST 21 U/L      ALT 23 U/L      Alkaline Phosphatase 116 U/L      Total Protein 8 1 g/dL      Albumin 3 7 g/dL      Total Bilirubin 0 32 mg/dL      eGFR 29 ml/min/1 73sq m     Narrative:      [place] guidelines for Chronic Kidney Disease (CKD):     Stage 1 with normal or high GFR (GFR > 90 mL/min/1 73 square meters)    Stage 2 Mild CKD (GFR = 60-89 mL/min/1 73 square meters)    Stage 3A Moderate CKD (GFR = 45-59 mL/min/1 73 square meters)    Stage 3B Moderate CKD (GFR = 30-44 mL/min/1 73 square meters)    Stage 4 Severe CKD (GFR = 15-29 mL/min/1 73 square meters)    Stage 5 End Stage CKD (GFR <15 mL/min/1 73 square meters)  Note: GFR calculation is accurate only with a steady state creatinine    HS Troponin 0hr (reflex protocol) [[id number]]  (Normal) Collected: 07/27/22 0025    Lab Status: Final result Specimen: Blood from Arm, Right Updated: 07/27/22 0058     hs TnI 0hr 21 ng/L     CBC and differential [[id number]]  (Abnormal) Collected: 07/27/22 0025    Lab Status: Final result Specimen: Blood from Arm, Right Updated: 07/27/22 0037     WBC 7 18 Thousand/uL      RBC 4 26 Million/uL      Hemoglobin 12 9 g/dL      Hematocrit 39 3 %      MCV 92 fL      MCH 30 3 pg      MCHC 32 8 g/dL      RDW 13 2 %      MPV 10 8 fL      Platelets 854 Thousands/uL      nRBC 0 /100 WBCs      Neutrophils Relative 76 %      Immat GRANS % 0 %      Lymphocytes Relative 16 %      Monocytes Relative 8 %      Eosinophils Relative 0 %      Basophils Relative 0 %      Neutrophils Absolute 5 44 Thousands/µL      Immature Grans Absolute 0 02 Thousand/uL      Lymphocytes Absolute 1 13 Thousands/µL      Monocytes Absolute 0 54 Thousand/µL      Eosinophils Absolute 0 02 Thousand/µL      Basophils Absolute 0 03 Thousands/µL                  CT head without contrast   Final Result by Priti Hernández MD (07/27 0125)      No acute intracranial abnormality  Right TMJ demonstrates suboptimal alignment; is this subluxation or dislocation? Finding appears similar to prior exam Recommend clinical correlation  The study was marked in [facility] for immediate notification        Workstation performed: [id number]         XR chest 1 view portable    (Results Pending)              Procedures  ECG 12 Lead Documentation Only    Date/Time: 7/26/2022 11:40 PM  Performed by: Bryant Seip, PA-C  Authorized by: Bryant Seip, PA-C     Indications / Diagnosis:  Chest Burning  Patient location:  ED  Previous ECG:     Previous ECG:  Compared to current    Comparison ECG info:  7/26/22    Similarity:  Changes noted  Interpretation:     Interpretation: non-specific    Rate:     ECG rate:  62    ECG rate assessment: normal    Rhythm:     Rhythm: sinus rhythm    QRS:     QRS axis:  Normal    QRS intervals:  Normal  Conduction:     Conduction: normal    ST segments:     ST segments:  Normal  T waves:     T waves: normal      ECG 12 Lead Documentation Only    Date/Time: 7/27/2022 3:45 AM  Performed by: Bryant Seip, PA-C  Authorized by: Bryant Seip, PA-C     Indications / Diagnosis:  Delta  ECG reviewed by me, the ED Provider: yes    Patient location:  ED  Previous ECG:     Previous ECG:  Compared to current    Similarity:  No change  Interpretation:     Interpretation: normal    Rate:     ECG rate:  67    ECG rate assessment: normal    Rhythm:     Rhythm: sinus rhythm    Ectopy:     Ectopy: none    QRS:     QRS axis:  Normal    QRS intervals:  Normal  Conduction:     Conduction: normal    ST segments:     ST segments:  Normal  T waves:     T waves: normal               ED Course             HEART Risk Score    Flowsheet Row Most Recent Value   Heart Score Risk Calculator    History 0 Filed at: 07/27/2022 0324   ECG 1 Filed at: 07/27/2022 0324   Age 1 Filed at: 07/27/2022 0324   Risk Factors 2 Filed at: 07/27/2022 0324   Troponin 1 Filed at: 07/27/2022 0324   HEART Score 5 Filed at: 07/27/2022 8186                        SBIRT 22yo+    Flowsheet Row Most Recent Value   SBIRT (23 yo +)    In order to provide better care to our patients, we are screening all of our patients for alcohol and drug use  Would it be okay to ask you these screening questions? Yes Filed at: 07/26/2022 2303   Initial Alcohol Screen: US AUDIT-C     1  How often do you have a drink containing alcohol? 0 Filed at: 07/26/2022 2303   2  How many drinks containing alcohol do you have on a typical day you are drinking? 0 Filed at: 07/26/2022 2303   3b  FEMALE Any Age, or MALE 65+: How often do you have 4 or more drinks on one occassion? 0 Filed at: 07/26/2022 2303   Audit-C Score 0 Filed at: 07/26/2022 2303   BEATA: How many times in the past year have you    Used an illegal drug or used a prescription medication for non-medical reasons? Never Filed at: 07/26/2022 2303                    MDM  Number of Diagnoses or Management Options  Diagnosis management comments: Patient was seen and examined  in the emergency department for chief complaint of headache and chest wall pain   The patient presented after having emotional phone call 5:00 p m  When symptoms started  Describes the headache is moderate dull and aching  No medications prior to arrival   No diaphoresis syncope, presyncope, palpitations, recent trauma blurry vision, neck stiffness  No fevers or chills  No GI symptoms  No rashes  On exam patient appears tearful but in no acute distress, lungs are clear, regular rate rhythm, no murmurs rubs or gallops  Abdomen soft nontender nondistended  No lower extremity swelling  Neuro exam is nonfocal -baseline  DDx including but not limited to: tension headache, cluster headache, migraine, ICH, SAH, tumor, meningitis,  zoster, sinusitis  DDX including but not limited to: chest wall pain, pleurisy, costochondritis, pericarditis, myocarditis, PTX, pneumonia, GI etiology; doubt ACS or MI or dissection or PE or rhabdomyolysis  Workup: Will obtain basic labs including CBC, CMP, troponin, EKG  Will obtain CT head  We discussed CT head for headache being that she cannot clarify sudden onset of headache  Being that she has right within with no for CT interpretation of intracranial hemorrhage we discussed use of LP  She declined LP at this point  Feel this is appropriate  Will medicate with symptomatic control  Reassessment  On reassessment patient symptomatically feeling better  Delta negative  Heart score 5 so will have patient follow-up with Cardiology regarding this  Patient's blood pressure is drastically improved after administration of Norvasc  Will have patient follow-up with her PCP for this  Clinically reassess patient in the ENT region  She does not have any evidence of TMJ subluxation or dislocation  Full range of motion of the jaw  Recommended follow-up with PCP regarding this finding, do not feel there is anything acute today  Disposition:  General impression 51-year-old female comes in for headache as well as chest wall pressure    Patient treated and symptomatically feeling better  Heart score 5, follow-up with Cardiology  Patient states her chest pain and headache have resolved  The patient was discharged in stable condition  Patient ambulated off the department  Extensive return to emergency department precautions were discussed  Follow up with appropriate providers including primary care physician was discussed  Patient and/or their  primary decision maker expressed understanding  Patient remained stable during entire emergency department stay           Amount and/or Complexity of Data Reviewed  Clinical lab tests: ordered and reviewed  Tests in the radiology section of CPT®: ordered and reviewed  Tests in the medicine section of CPT®: ordered and reviewed  Review and summarize past medical records: yes  Independent visualization of images, tracings, or specimens: yes    Risk of Complications, Morbidity, and/or Mortality  Presenting problems: moderate  Diagnostic procedures: moderate  Management options: moderate    Patient Progress  Patient progress: stable      Disposition  Final diagnoses:   Acute nonintractable headache, unspecified headache type   Burning chest pain   Elevated systolic blood pressure reading with diagnosis of hypertension     Time reflects when diagnosis was documented in both MDM as applicable and the Disposition within this note     Time User Action Codes Description Comment    7/27/2022  3:52 AM Carnicole Fulton Add [R51 9] Acute nonintractable headache, unspecified headache type     7/27/2022  3:52 AM Carvel Kirstin Add [R07 89] Burning chest pain     7/27/2022  3:53 AM Juan Rodriguez Add [T44] Elevated systolic blood pressure reading with diagnosis of hypertension       ED Disposition     None      Follow-up Information     Follow up With Specialties Details Why Contact Info Additional Information    Bren Singh PA-C Family Medicine Schedule an appointment as soon as possible for a visit   [address] [facility] [address] [facility] Emergency Department Emergency Medicine Go to  As needed, If symptoms worsen [facility] [id number]  [address] [facility] Emergency Department, [address] [facility] , [place], [place], [address] Cardiology [facility] Cardiology Schedule an appointment as soon as possible for a visit   [facility] [address]  [address], [address] [facility], [place], [place], [place], [address] [phone]          Patient's Medications   Discharge Prescriptions    No medications on file           PDMP Review       Value Time User    PDMP Reviewed  Yes 7/11/2022  5:04 PM Adriana Jaime PA-C          ED Provider  Electronically Signed by           Vickie Canales PA-C  07/27/22 [id number]

## 2022-07-29 PROCEDURE — 93010 ELECTROCARDIOGRAM REPORT: CPT | Performed by: INTERNAL MEDICINE

## 2022-08-01 ENCOUNTER — TELEPHONE (OUTPATIENT)
Dept: FAMILY MEDICINE CLINIC | Facility: CLINIC | Age: 52
End: 2022-08-01

## 2022-08-01 LAB
OXYCODONE UR QL CFM: 2182 NG/ML
OXYCODONE+OXYMORPHONE SERPLBLD QL SCN: POSITIVE
OXYCODONE+OXYMORPHONE UR QL SCN: NORMAL NG/ML
OXYCODONE+OXYMORPHONE UR QL SCN: POSITIVE
OXYMORPHONE UR CFM-MCNC: 1831 NG/ML
OXYMORPHONE UR QL CFM: POSITIVE

## 2022-08-01 NOTE — ASSESSMENT & PLAN NOTE
- Originally diagnosed in 2015  Currently in remission  Previously on combination plasmapheresis, steroids, Cytoxan  Patient was then briefly on imuran until 2017  - Last seen by Rheumatology in February 2018, was then lost to follow up  - Patient is scheduled to re-establish with Rheumatology in December 2022

## 2022-08-01 NOTE — TELEPHONE ENCOUNTER
UDS form received on 08/01/2022  to be completed by PCP  Copy made and placed in PCP folder  Forms to be delivered to PCP mailbox at assigned time

## 2022-08-01 NOTE — ASSESSMENT & PLAN NOTE
- Patient has not been taking amlodipine for the past few weeks because her blood pressure has been on the lower side  Patient believes her blood pressure was only elevated yesterday secondary to the increased stress   - Advised to check blood pressure daily and to restart taking amlodipine 5 mg daily if blood pressure is >140/90

## 2022-08-01 NOTE — ASSESSMENT & PLAN NOTE
- Continue oxycodone 15 mg, every 4 hours as needed  PDMP reviewed  No red flags noted  - Updated UDS collected today  - Continue Lyrica 100 mg 3 times daily as prescribed through pain management  - Continue follow-up with pain management as scheduled  - Due to increased total body pain and fatigue, will order additional blood work  - Patient is scheduled to establish with Rheumatology in December 2022

## 2022-08-02 ENCOUNTER — OFFICE VISIT (OUTPATIENT)
Dept: OBGYN CLINIC | Facility: HOSPITAL | Age: 52
End: 2022-08-02

## 2022-08-02 ENCOUNTER — HOSPITAL ENCOUNTER (OUTPATIENT)
Dept: RADIOLOGY | Facility: HOSPITAL | Age: 52
Discharge: HOME | End: 2022-08-02
Attending: ORTHOPAEDIC SURGERY
Payer: MEDICARE

## 2022-08-02 VITALS
DIASTOLIC BLOOD PRESSURE: 80 MMHG | WEIGHT: 142 LBS | SYSTOLIC BLOOD PRESSURE: 123 MMHG | HEIGHT: 62 IN | BODY MASS INDEX: 26.13 KG/M2 | HEART RATE: 55 BPM

## 2022-08-02 DIAGNOSIS — Z87.81 S/P ORIF (OPEN REDUCTION INTERNAL FIXATION) FRACTURE: ICD-10-CM

## 2022-08-02 DIAGNOSIS — Z98.890 S/P ORIF (OPEN REDUCTION INTERNAL FIXATION) FRACTURE: ICD-10-CM

## 2022-08-02 DIAGNOSIS — S62.102A CLOSED FRACTURE OF LEFT WRIST, INITIAL ENCOUNTER: Primary | ICD-10-CM

## 2022-08-02 DIAGNOSIS — M79.2 NERVE PAIN: ICD-10-CM

## 2022-08-02 PROCEDURE — 73110 X-RAY EXAM OF WRIST: CPT

## 2022-08-02 PROCEDURE — 99024 POSTOP FOLLOW-UP VISIT: CPT | Performed by: ORTHOPAEDIC SURGERY

## 2022-08-02 NOTE — PROGRESS NOTES
Assessment:   Diagnosis ICD-10-CM Associated Orders   1  Closed fracture of left wrist, initial encounter  S62 102A DXA bone density spine hip and pelvis   2  Nerve pain  M79 2 Ambulatory Referral to Occupational Therapy       Plan:  · A discussion was had with the patient that her imaging looks very good at today's visit  · She may continue with AROM and PROM of the wrist   She may start to lift some things with the hand but should not lift more than 5 pounds  · Her vicryl sutures emerging through the skin were trimmed  We discussed that the rest should continue to be absorbed by the body  She should monitor for signs of infection  · Her nerve pain may be indicative of a forming pain syndrome  She was advised to massage the area to help desensitize the nerves and an occupational therapy referral was made to assist the patient with this  · She was given the number for central scheduling to schedule her DXA bone scan  · The physician was consulted and was instrumental in the formulation of the plan  To do next visit:  Follow-up in 6 weeks for further imaging and to assess post-op pain and function  The above stated was discussed in layman's terms and the patient expressed understanding  All questions were answered to the patient's satisfaction  Subjective:   Jeanne Melchor is a 46 y o  female who presents to the office today for a 6-week post-op appointment from her ORIF of the left distal radius on 6/23/22  Today the patient reports some sensitivity to the cold and with light touch over the dorsal forearm  She also has some pain with certain wrist movements at this point  She has not done any formal PT but has been doing some home exercises  Review of systems negative unless otherwise specified in HPI    Past Medical History:   Diagnosis Date    ADHD     Anemia of chronic disease     Anxiety     Arthritis ?     Asthma     Bipolar disorder ([facility] [address] )     Borderline personality disorder ([place] [address] )     Cataplexy     Chronic abdominal pain     Chronic kidney disease ?  CKD (chronic kidney disease) stage 3, GFR 30-59 ml/min (HCC)     Cushing syndrome (HCC)     Depression ?  Diabetes mellitus ([facility] )     DVT (deep venous thrombosis) (HCC)     GERD (gastroesophageal reflux disease)     Headache(784 0) 3 months    History of acute pancreatitis     felt secondary to Bactrim    History of transfusion     Hypertension     Liver disease     fatty liver    Microscopic polyangiitis (HCC)     Ovarian cyst     PTSD (post-traumatic stress disorder)     Self-inflicted injury     self inflicted skin wounds    Sleep apnea     Wegener's granulomatosis with renal involvement ([place] [address] ) 2015       Past Surgical History:   Procedure Laterality Date    ESOPHAGOGASTRODUODENOSCOPY  09/11/2015    mild antral gastritis    GASTRIC STIMULATOR IMPLANT SURGERY  06/25/2020    OK COLONOSCOPY FLX DX W/COLLJ SPEC WHEN PFRMD N/A 12/14/2018    adenoma removed from the transverse, hyperplastic polyp removed from the left colon    OK ESOPHAGOGASTRODUODENOSCOPY TRANSORAL DIAGNOSTIC N/A 12/14/2018    gastritis and scant coffee-ground material   Biopsies negative for H  pylori    OK OPEN TX RADIAL & ULNAR SHAFT FX FIX RADIUS AND ULNA Left 6/23/2022    Procedure: OPEN REDUCTION W/ INTERNAL FIXATION (ORIF) RADIUS / ULNA (WRIST); Surgeon: Jaydon Turner MD;  Location: [facility];  Service: Orthopedics    RELEASE SCAR CONTRACTURE / GRAFT REPAIRS OF HAND Bilateral     UPPER GASTROINTESTINAL ENDOSCOPY  12/26/2019    Dr Brigida Dakins   Botox to the pylorus       Family History   Problem Relation Age of Onset    Arthritis Mother     Depression Mother     Diabetes Mother     Mental illness Mother    [place] Migraines Mother     No Known Problems Father     Colon cancer Neg Hx     Drug abuse Neg Hx         mother father    Mental illness Neg Hx         disorder, mother father    Cancer Neg Hx     Breast cancer Neg Hx        Social History     Occupational History    Occupation: disability   Tobacco Use    Smoking status: Former Smoker     Packs/day: 1 00     Years: 10 00     Pack years: 10 00     Types: Cigarettes     Quit date: 2011     Years since quittin 5    Smokeless tobacco: Never Used    Tobacco comment: Stopped smoking 11 years ago   Vaping Use    Vaping Use: Never used   Substance and Sexual Activity    Alcohol use: Never    Drug use: Yes     Types: Marijuana     Comment: marijuana daily    Sexual activity: Not Currently     Partners: Male     Birth control/protection: None         Current Outpatient Medications:     acetaminophen (TYLENOL) 500 mg tablet, Take 2 tablets (1,000 mg total) by mouth every 8 (eight) hours as needed for mild pain, Disp: 60 tablet, Rfl: 1    albuterol (2 5 mg/3 mL) 0 083 % nebulizer solution, USE 1 VIAL VIA NEBULIZER EVERY 6 HOURS AS NEEDED FOR WHEEZING OR SHORTNESS OF BREATH, Disp: 90 mL, Rfl: 0    albuterol (Ventolin HFA) 90 mcg/act inhaler, Inhale 2 puffs every 6 (six) hours as needed for wheezing or shortness of breath, Disp: 18 g, Rfl: 1    Alcohol Swabs (Alcohol Pads) 70 % PADS, Use 4 (four) times a day, Disp: 400 each, Rfl: 2    amphetamine-dextroamphetamine (ADDERALL XR) 20 MG 24 hr capsule, Take 1 capsule (20 mg total) by mouth 2 (two) times a day Max Daily Amount: 40 mg, Disp: 60 capsule, Rfl: 0    Blood Glucose Monitoring Suppl (FreeStyle Lite) DEIRDRE, Use daily, Disp: 1 each, Rfl: 0    budesonide-formoterol (Symbicort) 80-4 5 MCG/ACT inhaler, Inhale 2 puffs 2 (two) times a day Rinse mouth after use , Disp: 10 2 g, Rfl: 1    buPROPion (WELLBUTRIN XL) 300 mg 24 hr tablet, Take 1 tablet (300 mg total) by mouth every morning, Disp: 30 tablet, Rfl: 2    clobetasol (TEMOVATE) 0 05 % cream, APPLY TOPICALLY TO AFFECTED AREA TWICE A DAY, Disp: 60 g, Rfl: 0    Diclofenac Sodium (VOLTAREN) 1 %, Apply 2 g topically 4 (four) times a day, Disp: 100 g, Rfl: 0    Diclofenac Sodium (VOLTAREN) 1 %, Apply 2 g topically 4 (four) times a day, Disp: 50 g, Rfl: 0    Easy Comfort Lancets MISC, USE TO TEST THE BLOOD SUGAR THREE TIMES A DAY, Disp: 300 each, Rfl: 10    glucose blood (FREESTYLE LITE) test strip, Use as instructed, Disp: 100 strip, Rfl: 0    insulin aspart (NovoLOG FlexPen) 100 UNIT/ML injection pen, Inject 6 Units under the skin 3 (three) times a day with meals, Disp: 15 mL, Rfl: 0    insulin glargine (Toujeo Max SoloStar) 300 units/mL CONCENTRATED U-300 injection pen (2-unit dial), Inject 22 Units under the skin daily (Patient taking differently: Inject 22 Units under the skin daily at bedtime), Disp: 9 mL, Rfl: 0    Insulin Pen Needle (Pen Needles) 31G X 8 MM MISC, Inject under the skin 4 (four) times a day (with meals and at bedtime), Disp: 100 each, Rfl: 11    lidocaine (LMX) 4 % cream, Apply topically as needed for mild pain, Disp: 30 g, Rfl: 0    lidocaine (XYLOCAINE) 5 % ointment, APPLY TOPICALLY 2GM TWICE A DAY TO AFFECTED AREAS AS NEEDED FOR MILD PAIN (Patient taking differently: Lidocaine patches), Disp: 250 g, Rfl: 8    Linzess 72 MCG CAPS, TAKE ONE CAPSULE BY MOUTH ONCE DAILY, Disp: 90 capsule, Rfl: 10    methocarbamol (ROBAXIN) 500 mg tablet, Take 1 tablet (500 mg total) by mouth 2 (two) times a day, Disp: 20 tablet, Rfl: 0    naloxone (NARCAN) 4 mg/0 1 mL nasal spray, Administer 1 spray into a nostril   If no response after 2-3 minutes, give another dose in the other nostril using a new spray , Disp: 1 each, Rfl: 1    NON FORMULARY, [facility], Disp: , Rfl:     ondansetron (ZOFRAN) 8 mg tablet, Take 1 tablet (8 mg total) by mouth every 8 (eight) hours as needed for nausea or vomiting, Disp: 30 tablet, Rfl: 2    oxyCODONE (Roxicodone) 15 mg immediate release tablet, Take 1 tablet (15 mg total) by mouth every 4 (four) hours as needed for moderate pain Max Daily Amount: 90 mg, Disp: 180 tablet, Rfl: 0    pantoprazole (PROTONIX) 40 mg tablet, Take 1 tablet (40 mg total) by mouth 2 (two) times a day before meals, Disp: 180 tablet, Rfl: 1    pregabalin (LYRICA) 100 mg capsule, Take 1 capsule (100 mg total) by mouth 3 (three) times a day, Disp: 90 capsule, Rfl: 1    promethazine (PHENERGAN) 25 mg tablet, Take 1 tablet (25 mg total) by mouth every 6 (six) hours as needed for nausea or vomiting, Disp: 60 tablet, Rfl: 3    Restasis 0 05 % ophthalmic emulsion, , Disp: , Rfl:     scopolamine (TRANSDERM-SCOP) 1 5 mg/3 days TD 72 hr patch, Place 1 patch on the skin every third day, Disp: 10 patch, Rfl: 0    sucralfate (CARAFATE) 1 g/10 mL suspension, TAKE 10ML BY MOUTH THREE TIMES A DAY, Disp: 500 mL, Rfl: 2    amLODIPine (NORVASC) 5 mg tablet, TAKE ONE TABLET BY MOUTH ONCE DAILY (Patient not taking: No sig reported), Disp: 90 tablet, Rfl: 1    dicyclomine (BENTYL) 20 mg tablet, Take 1 tablet (20 mg total) by mouth in the morning and 1 tablet (20 mg total) in the evening  Do all this for 7 days  , Disp: 20 tablet, Rfl: 0    sucralfate (CARAFATE) 1 g/10 mL suspension, Take 10 mL (1 g total) by mouth in the morning and 10 mL (1 g total) at noon and 10 mL (1 g total) in the evening and 10 mL (1 g total) before bedtime  Do all this for 7 days  , Disp: 420 mL, Rfl: 0    Allergies   Allergen Reactions    Prozac [Fluoxetine Hcl]      SI    Bactrim [Sulfamethoxazole-Trimethoprim]      Pt "They think that is what cause the pancreatitis"     Flagyl [Metronidazole] Diarrhea and Abdominal Pain    Lamictal [Lamotrigine] GI Intolerance    Lithium Other (See Comments)    Haldol [Haloperidol] Other (See Comments)     "I don't like it"    Ibuprofen     Lexapro [Escitalopram Oxalate] Rash    Navane [Thiothixene]      SI    Other      "novaine?" antipsychotic            Vitals:    08/02/22 0840   BP: 123/80   Pulse: 55       Objective:    General:  Patient is WDWN, alert and oriented, appears stated age, and is in no acute distress      Musculoskeletal:    Left Wrist:    Inspection:  Incisions are well-approximated and well-healed without erythema or purulent material indicative of infection  Vicryl suture is noted to be protruding through the skin without evidence of surrounding infection  There is significant scarring over the dorsal aspect of the forearm  The patient notes that she had a significant history of self-mutilation 7 years ago  Range of Motion:  The patient is able to move all fingers without difficulty  She can make a full composite fist   She can achieve approximately 60 degrees of wrist flexion and 45 degrees of wrist extension  She has good radial and ulnar deviation but states she does have some pain, especially with ulnar deviation  She has full active supination and pronation of the wrist     Palpation:  The patient is not tender with light palpation of the fracture site  Sensation:  SILT over the fingers  Other:  Fingers WWP  Cap refill is less than 2 seconds  Diagnostics, reviewed and taken today if performed as documented: The attending physician has personally reviewed the pertinent films in PACS and interpretation is as follows: The patient's fracture is held in stable alignment  There is no evidence of hardware loosening or failure  Procedures, if performed today:    None performed      Portions of the record may have been created with voice recognition software  Occasional wrong word or "sound a like" substitutions may have occurred due to the inherent limitations of voice recognition software  Read the chart carefully and recognize, using context, where substitutions have occurred

## 2022-08-03 ENCOUNTER — TELEPHONE (OUTPATIENT)
Dept: NEPHROLOGY | Facility: CLINIC | Age: 52
End: 2022-08-03

## 2022-08-03 NOTE — TELEPHONE ENCOUNTER
Appointment Confirmation   Person confirmed appointment with  If not patient, name of the person Patient     Date and time of appointment 08/04   Patient acknowledged and will be at appointment?  yes   Did you advise the patient that they will need a urine sample if they are a new patient? no   Did you advise the patient to bring their current medications for verification? (including any OTC) yes   Additional Information

## 2022-08-04 ENCOUNTER — OFFICE VISIT (OUTPATIENT)
Dept: NEPHROLOGY | Facility: CLINIC | Age: 52
End: 2022-08-04
Payer: MEDICARE

## 2022-08-04 VITALS
WEIGHT: 141 LBS | BODY MASS INDEX: 25.95 KG/M2 | HEIGHT: 62 IN | DIASTOLIC BLOOD PRESSURE: 74 MMHG | SYSTOLIC BLOOD PRESSURE: 128 MMHG

## 2022-08-04 DIAGNOSIS — R80.1 PERSISTENT PROTEINURIA: ICD-10-CM

## 2022-08-04 DIAGNOSIS — N18.4 CHRONIC KIDNEY DISEASE, STAGE 4 (SEVERE) (HCC): Primary | ICD-10-CM

## 2022-08-04 DIAGNOSIS — I10 BENIGN ESSENTIAL HTN: ICD-10-CM

## 2022-08-04 DIAGNOSIS — M31.7 MPA (MICROSCOPIC POLYANGIITIS) (HCC): Chronic | ICD-10-CM

## 2022-08-04 LAB
ATRIAL RATE: 62 BPM
ATRIAL RATE: 76 BPM
P AXIS: 58 DEGREES
P AXIS: 83 DEGREES
PR INTERVAL: 150 MS
PR INTERVAL: 160 MS
QRS AXIS: -24 DEGREES
QRS AXIS: 1 DEGREES
QRSD INTERVAL: 64 MS
QRSD INTERVAL: 64 MS
QT INTERVAL: 384 MS
QT INTERVAL: 404 MS
QTC INTERVAL: 410 MS
QTC INTERVAL: 432 MS
T WAVE AXIS: 24 DEGREES
T WAVE AXIS: 38 DEGREES
VENTRICULAR RATE: 62 BPM
VENTRICULAR RATE: 76 BPM

## 2022-08-04 PROCEDURE — 99214 OFFICE O/P EST MOD 30 MIN: CPT | Performed by: PHYSICIAN ASSISTANT

## 2022-08-04 NOTE — PATIENT INSTRUCTIONS
Your kidney function remains stable  Most recent creatinine 1 94 and at baseline  We will continue routine surveillance as outlined below  Avoid all NSAIDs to include ibuprofen, Motrin, Aleve, Advil, Naproxen, Celebrex, Indomethacin, Toradol  Stay well hydrated  Continue all prescribed medications  Call if blood pressure consistently more than 140/90 or less than 110/50s  High and low blood pressures may affect your kidney function  Recommend low salt diet (2 gm sodium diet)  Please let us know if you are scheduled for any studies with IV contrast (ex: CT scan, arteriogram or cardiac catheterization)   Follow up in 6 months with Dr Mary Palacios with repeat labs prior to appointment  Will obtain repeat labs in 3 months  Please contact the office with new symptoms or concerns

## 2022-08-04 NOTE — PROGRESS NOTES
Assessment and Plan:  Chronic kidney disease IIIB/IV:    -Baseline creatinine 1 8-2 0  -Follows with Dr Олег Frey  -recent creatinine 1 94 and at baseline   -avoid nephrotoxins, NSAIDs, hypotension and IV contrast if possible   -will repeat labs in 3 months to follow stability  -follow-up with Dr Олег Frey in office in 6 months with repeat blood and urine studies  Hx microscopic polyangiitis:  -currently in remission  -ANCA profile negative 12/30/21  Repeat pending  -progressive diffuse bone pain with unexplained left wrist fx  Bone scan pending  -previously on combination plasmapheresis, steroids, Cytoxan  -off immunosuppression since 2015  -lost to follow-up with Rheumatology  Scheduled for f/u in Dec  '22   -f/u with PCP and Rheumatology  Hypertension/Volume status:  -BP well controlled and acceptable  -clinically, examines euvolemic  -no longer on  Amlodipine 5 mg daily since starting Lyrica due to low BP  -Avoid hypotension or fluctuations in blood pressure    -low sodium (2 gm) diet  Encourage regular exercise  -continue to monitor BP at home    Proteinuria:  -UA in May with 2+ protein  -UPCR not done  -continue to monitor and repeat prior to next appt    Anemia of CKD:  -Hgb 12 9 and at gaol  Goal >10   -continue to monitor and repeat prior to next appt    Bone Mineral Disease of CKD:  -phosphorus 3 4 in 12/'21  -PTH 87 7  Will continue to monitor  Repeat PTH in 3 months  -continue to monitor  Check PTH, phosphorus, calcium, magnesium, vitamin D25 prior to next appointment    Hx gastroparesis:  -stable  -prior hx of associated MARY due to volume depletion  -previously followed at [facility]    Age related screening: Your primary caregiver may do yearly screening for colorectal cancer  It is recommended in all men and women over 48years old   You may have screening earlier if you have colon disease or a family history of colorectal cancer      Ania Azar was seen today for follow-up, chronic kidney disease, hypertension and proteinuria  Diagnoses and all orders for this visit:    Chronic kidney disease, stage 4 (severe) ([facility] [address] )  -     Basic metabolic panel; Future  -     Protein / creatinine ratio, urine; Future  -     Phosphorus; Future  -     Urinalysis with microscopic; Future  -     Basic metabolic panel; Future  -     CBC; Future  -     Phosphorus; Future  -     Protein / creatinine ratio, urine; Future  -     PTH, intact; Future  -     Urinalysis with microscopic; Future  -     Vitamin D 25 hydroxy; Future    MPA (microscopic polyangiitis) (HCC)    Benign essential HTN    Persistent proteinuria  -     Protein / creatinine ratio, urine; Future  -     Urinalysis with microscopic; Future  -     Protein / creatinine ratio, urine; Future  -     Urinalysis with microscopic; Future        Repeat labs in 3 months  If stable, Follow up with Dr Kavin Kan in 6 months with repeat blood and urine studies  Please call the office with any questions or concerns  Reason for Visit: Follow-up, Chronic Kidney Disease, Hypertension, and Proteinuria    HPI: Yina Ibarra is a 46 y o  female with CKD IIIB/IV with baseline creatinine 1 9-2 0, hx microscopic polyangiitis in remission, DM2, HTN, anemia, asthma, bipolar disorder who presents for follow up of CKD  Patient followed by Dr Kavin Kan, last seen on telemedicine visit 1/5/2022  Most recent creatinine stable at 1 94, GFR 29  Patient with no recent hospitalizations but seen in the ER on 7/26/2022 due to intractable headache  Urine drug screen positive for amphetamines, oxycodone and cannabis (pt on Adderall)  Pt on chronic opiates for chronic pain syndrome  Pt c/o chronic generalized "bone pain" and bone scan ordered  Ongoing workup by PCP and rheumatology  ANCA pending  Patient denies NSAID use  Patient denies nausea, vomiting, abdominal pain, hemoptysis, hematuria, dyspnea, edema or foamy urine        ROS: A complete review of systems was performed and was negative unless otherwise noted in the history of present illness  Allergies:   Prozac [fluoxetine hcl], Bactrim [sulfamethoxazole-trimethoprim], Flagyl [metronidazole], Lamictal [lamotrigine], Lithium, Haldol [haloperidol], Ibuprofen, Lexapro [escitalopram oxalate], Navane [thiothixene], and Other    Medications:     Current Outpatient Medications:     albuterol (2 5 mg/3 mL) 0 083 % nebulizer solution, USE 1 VIAL VIA NEBULIZER EVERY 6 HOURS AS NEEDED FOR WHEEZING OR SHORTNESS OF BREATH, Disp: 90 mL, Rfl: 0    albuterol (Ventolin HFA) 90 mcg/act inhaler, Inhale 2 puffs every 6 (six) hours as needed for wheezing or shortness of breath, Disp: 18 g, Rfl: 1    Alcohol Swabs (Alcohol Pads) 70 % PADS, Use 4 (four) times a day, Disp: 400 each, Rfl: 2    amphetamine-dextroamphetamine (ADDERALL XR) 20 MG 24 hr capsule, Take 1 capsule (20 mg total) by mouth 2 (two) times a day Max Daily Amount: 40 mg, Disp: 60 capsule, Rfl: 0    budesonide-formoterol (Symbicort) 80-4 5 MCG/ACT inhaler, Inhale 2 puffs 2 (two) times a day Rinse mouth after use , Disp: 10 2 g, Rfl: 1    buPROPion (WELLBUTRIN XL) 300 mg 24 hr tablet, Take 1 tablet (300 mg total) by mouth every morning, Disp: 30 tablet, Rfl: 2    clobetasol (TEMOVATE) 0 05 % cream, APPLY TOPICALLY TO AFFECTED AREA TWICE A DAY, Disp: 60 g, Rfl: 0    Diclofenac Sodium (VOLTAREN) 1 %, Apply 2 g topically 4 (four) times a day, Disp: 100 g, Rfl: 0    Diclofenac Sodium (VOLTAREN) 1 %, Apply 2 g topically 4 (four) times a day, Disp: 50 g, Rfl: 0    dicyclomine (BENTYL) 20 mg tablet, Take 1 tablet (20 mg total) by mouth in the morning and 1 tablet (20 mg total) in the evening  Do all this for 7 days  , Disp: 20 tablet, Rfl: 0    insulin aspart (NovoLOG FlexPen) 100 UNIT/ML injection pen, Inject 6 Units under the skin 3 (three) times a day with meals, Disp: 15 mL, Rfl: 0    insulin glargine (Toujeo Max SoloStar) 300 units/mL CONCENTRATED U-300 injection pen (2-unit dial), Inject 22 Units under the skin daily (Patient taking differently: Inject 22 Units under the skin daily at bedtime), Disp: 9 mL, Rfl: 0    Insulin Pen Needle (Pen Needles) 31G X 8 MM MISC, Inject under the skin 4 (four) times a day (with meals and at bedtime), Disp: 100 each, Rfl: 11    lidocaine (LMX) 4 % cream, Apply topically as needed for mild pain, Disp: 30 g, Rfl: 0    lidocaine (XYLOCAINE) 5 % ointment, APPLY TOPICALLY 2GM TWICE A DAY TO AFFECTED AREAS AS NEEDED FOR MILD PAIN (Patient taking differently: Lidocaine patches), Disp: 250 g, Rfl: 8    Linzess 72 MCG CAPS, TAKE ONE CAPSULE BY MOUTH ONCE DAILY, Disp: 90 capsule, Rfl: 10    methocarbamol (ROBAXIN) 500 mg tablet, Take 1 tablet (500 mg total) by mouth 2 (two) times a day, Disp: 20 tablet, Rfl: 0    naloxone (NARCAN) 4 mg/0 1 mL nasal spray, Administer 1 spray into a nostril   If no response after 2-3 minutes, give another dose in the other nostril using a new spray , Disp: 1 each, Rfl: 1    NON FORMULARY, [facility], Disp: , Rfl:     ondansetron (ZOFRAN) 8 mg tablet, Take 1 tablet (8 mg total) by mouth every 8 (eight) hours as needed for nausea or vomiting, Disp: 30 tablet, Rfl: 2    oxyCODONE (Roxicodone) 15 mg immediate release tablet, Take 1 tablet (15 mg total) by mouth every 4 (four) hours as needed for moderate pain Max Daily Amount: 90 mg, Disp: 180 tablet, Rfl: 0    pantoprazole (PROTONIX) 40 mg tablet, Take 1 tablet (40 mg total) by mouth 2 (two) times a day before meals, Disp: 180 tablet, Rfl: 1    pregabalin (LYRICA) 100 mg capsule, Take 1 capsule (100 mg total) by mouth 3 (three) times a day, Disp: 90 capsule, Rfl: 1    promethazine (PHENERGAN) 25 mg tablet, Take 1 tablet (25 mg total) by mouth every 6 (six) hours as needed for nausea or vomiting, Disp: 60 tablet, Rfl: 3    Restasis 0 05 % ophthalmic emulsion, , Disp: , Rfl:     scopolamine (TRANSDERM-SCOP) 1 5 mg/3 days TD 72 hr patch, Place 1 patch on the skin every third day, Disp: 10 patch, Rfl: 0    acetaminophen (TYLENOL) 500 mg tablet, Take 2 tablets (1,000 mg total) by mouth every 8 (eight) hours as needed for mild pain (Patient not taking: Reported on 8/4/2022), Disp: 60 tablet, Rfl: 1    Blood Glucose Monitoring Suppl (FreeStyle Lite) DEIRDRE, Use daily, Disp: 1 each, Rfl: 0    Easy Comfort Lancets MISC, USE TO TEST THE BLOOD SUGAR THREE TIMES A DAY, Disp: 300 each, Rfl: 10    glucose blood (FREESTYLE LITE) test strip, Use as instructed, Disp: 100 strip, Rfl: 0    sucralfate (CARAFATE) 1 g/10 mL suspension, TAKE 10ML BY MOUTH THREE TIMES A DAY (Patient not taking: Reported on 8/4/2022), Disp: 500 mL, Rfl: 2    sucralfate (CARAFATE) 1 g/10 mL suspension, Take 10 mL (1 g total) by mouth in the morning and 10 mL (1 g total) at noon and 10 mL (1 g total) in the evening and 10 mL (1 g total) before bedtime  Do all this for 7 days  (Patient not taking: Reported on 8/4/2022), Disp: 420 mL, Rfl: 0    Past Medical History:   Diagnosis Date    ADHD     Anemia of chronic disease     Anxiety     Arthritis ?  Asthma     Bipolar disorder ([address] )     Borderline personality disorder ([address] )     Cataplexy     Chronic abdominal pain     Chronic kidney disease ?  CKD (chronic kidney disease) stage 3, GFR 30-59 ml/min (HCC)     Cushing syndrome (HCC)     Depression ?     Diabetes mellitus ([address] )     DVT (deep venous thrombosis) (HCC)     GERD (gastroesophageal reflux disease)     Headache(784 0) 3 months    History of acute pancreatitis     felt secondary to Bactrim    History of transfusion     Hypertension     Liver disease     fatty liver    Microscopic polyangiitis (HCC)     Ovarian cyst     PTSD (post-traumatic stress disorder)     Self-inflicted injury     self inflicted skin wounds    Sleep apnea     Wegener's granulomatosis with renal involvement ([address] ) 2015     Past Surgical History:   Procedure Laterality Date    ESOPHAGOGASTRODUODENOSCOPY  09/11/2015    mild antral gastritis    GASTRIC STIMULATOR IMPLANT SURGERY  06/25/2020    HI COLONOSCOPY FLX DX W/COLLJ SPEC WHEN PFRMD N/A 12/14/2018    adenoma removed from the transverse, hyperplastic polyp removed from the left colon    HI ESOPHAGOGASTRODUODENOSCOPY TRANSORAL DIAGNOSTIC N/A 12/14/2018    gastritis and scant coffee-ground material   Biopsies negative for H  pylori    HI OPEN TX RADIAL & ULNAR SHAFT FX FIX RADIUS AND ULNA Left 6/23/2022    Procedure: OPEN REDUCTION W/ INTERNAL FIXATION (ORIF) RADIUS / ULNA (WRIST); Surgeon: Ilsa Moore MD;  Location: [facility];  Service: Orthopedics    RELEASE SCAR CONTRACTURE / GRAFT REPAIRS OF HAND Bilateral     UPPER GASTROINTESTINAL ENDOSCOPY  12/26/2019    Dr Richard Mcbride  Botox to the pylorus     Family History   Problem Relation Age of Onset    Arthritis Mother     Depression Mother     Diabetes Mother     Mental illness Mother    Sylvester Ma Migraines Mother     No Known Problems Father     Colon cancer Neg Hx     Drug abuse Neg Hx         mother father    Mental illness Neg Hx         disorder, mother father    Cancer Neg Hx     Breast cancer Neg Hx       reports that she quit smoking about 11 years ago  Her smoking use included cigarettes  She has a 10 00 pack-year smoking history  She has never used smokeless tobacco  She reports current drug use  Drug: Marijuana  She reports that she does not drink alcohol  Physical Exam:   Vitals:    08/04/22 0947   BP: 128/74   BP Location: Right arm   Patient Position: Sitting   Cuff Size: Standard   Weight: 64 kg (141 lb)   Height: 5' 2" (1 575 m)     Body mass index is 25 79 kg/m²  General:  Awake, alert, appears comfortable and in no acute distress  Nontoxic  Skin:  No rash, warm, good skin turgor   Multiple scarring BUE and BLE  Eyes:  PERRL, EOMI, sclerae nonicteric   no periorbital edema   ENT:  Moist mucous membranes  Neck:  Trachea midline, symmetric  No JVD  No carotid bruits    Chest:  Clear to auscultation bilaterally without wheezes, crackles or rhonchi  CVS:  Regular rate and rhythm without murmur, gallop or rub  S1 and S2 identified and normal   No S3, S4    Abdomen:  Soft, nontender, nondistended without masses  Normal bowel sounds x 4 quadrants  No bruit  Extremities:  Warm, pink, motor and sensory intact and well perfused  No cyanosis, pallor  No BLE edema  Neuro:  Awake, alert, oriented x3  Grossly intact  Psych:  Appropriate affect  Mentating appropriately  Normal mental status exam      Procedure:  No results found  However, due to the size of the patient record, not all encounters were searched  Please check Results Review for a complete set of results  Lab Results   Component Value Date    GLUCOSE 103 08/01/2016    CALCIUM 8 9 07/27/2022     11/09/2017    K 4 1 07/27/2022    CO2 25 07/27/2022     07/27/2022    BUN 33 (H) 07/27/2022    CREATININE 1 94 (H) 07/27/2022             Invalid input(s): ALBUMIN    EMR, including Epic, Care Everywhere and outside scanned documents reviewed  I have personally reviewed the blood work as stated above and in my note     I have personally reviewed PCP, consultants and prior nephrology notes

## 2022-08-10 ENCOUNTER — TELEPHONE (OUTPATIENT)
Dept: FAMILY MEDICINE CLINIC | Facility: CLINIC | Age: 52
End: 2022-08-10

## 2022-08-10 DIAGNOSIS — Z79.4 TYPE 2 DIABETES MELLITUS WITHOUT COMPLICATION, WITH LONG-TERM CURRENT USE OF INSULIN (HCC): ICD-10-CM

## 2022-08-10 DIAGNOSIS — G89.4 CHRONIC PAIN SYNDROME: ICD-10-CM

## 2022-08-10 DIAGNOSIS — M31.31 GRANULOMATOSIS WITH POLYANGIITIS WITH RENAL INVOLVEMENT (HCC): ICD-10-CM

## 2022-08-10 DIAGNOSIS — E11.9 TYPE 2 DIABETES MELLITUS WITHOUT COMPLICATION, WITH LONG-TERM CURRENT USE OF INSULIN (HCC): ICD-10-CM

## 2022-08-10 RX ORDER — OXYCODONE HYDROCHLORIDE 15 MG/1
15 TABLET ORAL EVERY 4 HOURS PRN
Qty: 180 TABLET | Refills: 0 | Status: SHIPPED | OUTPATIENT
Start: 2022-08-10 | End: 2022-09-06 | Stop reason: SDUPTHER

## 2022-08-10 RX ORDER — INSULIN GLARGINE 300 U/ML
22 INJECTION, SOLUTION SUBCUTANEOUS DAILY
Qty: 9 ML | Refills: 1 | Status: SHIPPED | OUTPATIENT
Start: 2022-08-10 | End: 2022-10-26 | Stop reason: SDUPTHER

## 2022-08-12 ENCOUNTER — TELEPHONE (OUTPATIENT)
Dept: FAMILY MEDICINE CLINIC | Facility: CLINIC | Age: 52
End: 2022-08-12

## 2022-08-15 ENCOUNTER — HOSPITAL ENCOUNTER (OUTPATIENT)
Dept: BONE DENSITY | Facility: CLINIC | Age: 52
Discharge: HOME | End: 2022-08-15
Payer: MEDICARE

## 2022-08-15 DIAGNOSIS — S62.102A CLOSED FRACTURE OF LEFT WRIST, INITIAL ENCOUNTER: ICD-10-CM

## 2022-08-15 DIAGNOSIS — Z13.820 ENCOUNTER FOR SCREENING FOR OSTEOPOROSIS: ICD-10-CM

## 2022-08-15 PROCEDURE — 77080 DXA BONE DENSITY AXIAL: CPT

## 2022-08-19 ENCOUNTER — TELEPHONE (OUTPATIENT)
Dept: NEPHROLOGY | Facility: CLINIC | Age: 52
End: 2022-08-19

## 2022-08-23 ENCOUNTER — TELEPHONE (OUTPATIENT)
Dept: FAMILY MEDICINE CLINIC | Facility: CLINIC | Age: 52
End: 2022-08-23

## 2022-08-23 DIAGNOSIS — F32.A DEPRESSION, UNSPECIFIED DEPRESSION TYPE: ICD-10-CM

## 2022-08-23 NOTE — TELEPHONE ENCOUNTER
Spoke with Patient and schedule appointment for 02/08/2023 with Dr Royal Blair in the [facility]

## 2022-08-23 NOTE — TELEPHONE ENCOUNTER
PCP [facility] FORM RECEIVED VIA FAX AND PLACED IN PCP FOLDER TO BE DELIVERED AT ASSIGNED TIMES        Lancets # 100 - Comfort Touch

## 2022-08-24 RX ORDER — BUPROPION HYDROCHLORIDE 300 MG/1
TABLET ORAL
Qty: 30 TABLET | Refills: 1 | Status: SHIPPED | OUTPATIENT
Start: 2022-08-24 | End: 2022-10-18

## 2022-08-29 DIAGNOSIS — R21 RASH: ICD-10-CM

## 2022-09-01 ENCOUNTER — OFFICE VISIT (OUTPATIENT)
Dept: FAMILY MEDICINE CLINIC | Facility: CLINIC | Age: 52
End: 2022-09-01

## 2022-09-01 ENCOUNTER — TELEPHONE (OUTPATIENT)
Dept: FAMILY MEDICINE CLINIC | Facility: CLINIC | Age: 52
End: 2022-09-01

## 2022-09-01 VITALS
TEMPERATURE: 97.8 F | HEART RATE: 60 BPM | DIASTOLIC BLOOD PRESSURE: 74 MMHG | RESPIRATION RATE: 16 BRPM | OXYGEN SATURATION: 99 % | SYSTOLIC BLOOD PRESSURE: 102 MMHG

## 2022-09-01 DIAGNOSIS — F31.9 BIPOLAR 1 DISORDER (HCC): ICD-10-CM

## 2022-09-01 DIAGNOSIS — M31.7 MPA (MICROSCOPIC POLYANGIITIS) (HCC): Chronic | ICD-10-CM

## 2022-09-01 DIAGNOSIS — R10.2 PELVIC PAIN: Primary | ICD-10-CM

## 2022-09-01 DIAGNOSIS — G89.4 CHRONIC PAIN SYNDROME: Chronic | ICD-10-CM

## 2022-09-01 DIAGNOSIS — Z79.4 TYPE 2 DIABETES MELLITUS WITH STAGE 3 CHRONIC KIDNEY DISEASE, WITH LONG-TERM CURRENT USE OF INSULIN, UNSPECIFIED WHETHER STAGE 3A OR 3B CKD (HCC): ICD-10-CM

## 2022-09-01 DIAGNOSIS — N18.30 TYPE 2 DIABETES MELLITUS WITH STAGE 3 CHRONIC KIDNEY DISEASE, WITH LONG-TERM CURRENT USE OF INSULIN, UNSPECIFIED WHETHER STAGE 3A OR 3B CKD (HCC): ICD-10-CM

## 2022-09-01 DIAGNOSIS — Z79.4 TYPE 2 DIABETES MELLITUS WITHOUT COMPLICATION, WITH LONG-TERM CURRENT USE OF INSULIN (HCC): ICD-10-CM

## 2022-09-01 DIAGNOSIS — M85.80 OSTEOPENIA, UNSPECIFIED LOCATION: ICD-10-CM

## 2022-09-01 DIAGNOSIS — K13.79 MOUTH SORES: ICD-10-CM

## 2022-09-01 DIAGNOSIS — E11.9 TYPE 2 DIABETES MELLITUS WITHOUT COMPLICATION, WITH LONG-TERM CURRENT USE OF INSULIN (HCC): ICD-10-CM

## 2022-09-01 DIAGNOSIS — I10 BENIGN ESSENTIAL HTN: ICD-10-CM

## 2022-09-01 DIAGNOSIS — E11.22 TYPE 2 DIABETES MELLITUS WITH STAGE 3 CHRONIC KIDNEY DISEASE, WITH LONG-TERM CURRENT USE OF INSULIN, UNSPECIFIED WHETHER STAGE 3A OR 3B CKD (HCC): ICD-10-CM

## 2022-09-01 PROCEDURE — 3078F DIAST BP <80 MM HG: CPT | Performed by: PHYSICIAN ASSISTANT

## 2022-09-01 PROCEDURE — 3074F SYST BP LT 130 MM HG: CPT | Performed by: PHYSICIAN ASSISTANT

## 2022-09-01 PROCEDURE — 99215 OFFICE O/P EST HI 40 MIN: CPT | Performed by: PHYSICIAN ASSISTANT

## 2022-09-01 NOTE — PROGRESS NOTES
Assessment/Plan:    Osteopenia  - Review DEXA bone scan from 08/15/2022  Results show osteopenia  - Will start daily calcium and vitamin-D supplement  Bipolar 1 disorder (HCC)  - Stable  Continue Wellbutrin, Adderall   - PDMP reviewed  No red flags noted  - Continue follow-up with therapist as scheduled  MPA (microscopic polyangiitis) (HCC)  - Originally diagnosed in 2015  Currently in remission  Previously on combination plasmapheresis, steroids, Cytoxan  Patient was then briefly on imuran until 2017  - Last seen by Rheumatology in February 2018, was then lost to follow up  - Patient is scheduled to re-establish with Rheumatology in December 2022  Type 2 diabetes mellitus with stage 3 chronic kidney disease, with long-term current use of insulin ([facility] [address] )  - Patient has history of type 2 diabetes with long-term use of insulin with associated neuropathy and chronic kidney disease stage 3-4   - Continue Toujeo 22 units daily and NovoLog 6 units, 3 times daily with meals   - Continue checking daily blood sugars  Lab Results   Component Value Date    HGBA1C 5 7 02/28/2022    HGBA1C 5 7 09/29/2021    HGBA1C 6 0 02/15/2021     Mouth sores  - Patient believes as a result of side effect of Wellbutrin  Patient wishes to continue Wellbutrin since it is effective  - Patient has been using Magic mouthwash which has been helping to control the mouth sores  Will provide prescriptions today  Pelvic pain  - Patient reports pain when reaching climax while masturbating  Will refer to OBGYN for further evaluation and management  Chronic pain  - Continue oxycodone 15 mg, every 4 hours as needed  PDMP reviewed  No red flags noted  - UDS is up-to-date   - Continue Lyrica 100 mg 3 times daily as prescribed through pain management  Patient has been taking Lyrica intermittently due to medication causing low blood pressure    Recommended for patient to check her blood pressure daily and to avoid taking Lyrica if her blood pressure is <100/60    - Continue follow-up with pain management as scheduled  - Patient is scheduled to establish with Rheumatology in December 2022       Benign essential HTN  - Nephrology has now discontinued amlodipine  Patient has not been taking Lyrica regularly because it contributes to low blood pressure readings  - Will continue to monitor  Diagnoses and all orders for this visit:    Pelvic pain  -     Ambulatory Referral to Obstetrics / Gynecology; Future    Osteopenia, unspecified location  -     Cholecalciferol (Vitamin D) 50 MCG (2000 UT) tablet; Take 1 tablet (2,000 Units total) by mouth daily  -     calcium carbonate (OS-ALISIA) 600 MG tablet; Take 1 tablet (600 mg total) by mouth daily    Type 2 diabetes mellitus without complication, with long-term current use of insulin ([facility])  -     insulin aspart (NovoLOG FlexPen) 100 UNIT/ML injection pen; Inject 6 Units under the skin 3 (three) times a day with meals    Mouth sores    Chronic pain syndrome    Benign essential HTN    Bipolar 1 disorder (HCC)    MPA (microscopic polyangiitis) ([facility])    Type 2 diabetes mellitus with stage 3 chronic kidney disease, with long-term current use of insulin, unspecified whether stage 3a or 3b CKD ([facility] [address] )          All of patients questions were answered  Patient understands and agrees with the above plan  Return in about 4 weeks (around 9/29/2022) for Next scheduled follow up HTN, pain  Branden Lainez PA-C  09/01/22  [address] [facility] [place]          Subjective:     Patient ID: Karyn Lee  is a 46 y o  female with known PHM of gastroparesis, GERD, constipation, ADHD, type 2 diabetes mellitus, cataplexy, Wegener's granulomatosis, small fiber neuropathy, bipolar 1 disorder who presents today in office for DEXA scan follow-up  - Patient is a 46 y o  female who presents today for DEXA scan follow-up  Patient had recent DEXA scan completed and would like to review results    Patient notes recently she has been experiencing mouth sores  Patient notes she believes it is due to taking Wellbutrin  Patient notes she has been using Magic mouthwash which has been helpful  Patient notes recently she has been experiencing pelvic pain specifically when she reaches her peak with masturbation  Patient notes the pain is located in the right ovarian area  - Patient notes recently she has been taking Lyrica as needed instead of 3 times daily as prescribed because it causes low blood pressure  Patient notes Nephrology did discontinue amlodipine  The following portions of the patient's history were reviewed and updated as appropriate: allergies, current medications, past family history, past medical history, past social history, past surgical history and problem list         Review of Systems   Constitutional: Negative for chills and fever  HENT: Negative for congestion and sore throat  Mouth sores   Respiratory: Negative for cough, chest tightness and shortness of breath  Cardiovascular: Negative for chest pain and leg swelling  Gastrointestinal: Negative for abdominal pain, constipation, diarrhea, nausea and vomiting  Genitourinary: Positive for pelvic pain  Negative for dysuria  Musculoskeletal: Positive for arthralgias, back pain, myalgias and neck pain  Neurological: Positive for numbness and headaches  Negative for dizziness  Psychiatric/Behavioral: Negative for behavioral problems, self-injury and suicidal ideas  Objective:   Vitals:    09/01/22 0917   BP: 102/74   BP Location: Right arm   Patient Position: Sitting   Cuff Size: Standard   Pulse: 60   Resp: 16   Temp: 97 8 °F (36 6 °C)   TempSrc: Temporal   SpO2: 99%         Physical Exam  Vitals and nursing note reviewed  Constitutional:       General: She is not in acute distress  Appearance: She is well-developed  Comments: Examined in wheelchair   HENT:      Head: Normocephalic and atraumatic        Right Ear: External ear normal       Left Ear: External ear normal       Nose: Nose normal    Eyes:      Conjunctiva/sclera: Conjunctivae normal    Cardiovascular:      Rate and Rhythm: Normal rate and regular rhythm  Pulses: Normal pulses  Heart sounds: Normal heart sounds  Pulmonary:      Effort: Pulmonary effort is normal  No respiratory distress  Breath sounds: Normal breath sounds  No wheezing  Musculoskeletal:      Cervical back: Normal range of motion and neck supple  Skin:     General: Skin is warm and dry  Neurological:      Mental Status: She is alert and oriented to person, place, and time     Psychiatric:         Behavior: Behavior normal

## 2022-09-01 NOTE — TELEPHONE ENCOUNTER
[facility] [employer] form received on 09/01/22  to be completed by PCP  Copy made and placed in PCP folder  Forms to be delivered to PCP mailbox at assigned time      Received by mail    Case # DUR_SM: [id number]

## 2022-09-02 ENCOUNTER — TELEPHONE (OUTPATIENT)
Dept: FAMILY MEDICINE CLINIC | Facility: CLINIC | Age: 52
End: 2022-09-02

## 2022-09-02 DIAGNOSIS — K13.79 MOUTH SORES: Primary | ICD-10-CM

## 2022-09-02 RX ORDER — PHENOL 1.4 %
600 AEROSOL, SPRAY (ML) MUCOUS MEMBRANE DAILY
Qty: 90 TABLET | Refills: 1 | Status: SHIPPED | OUTPATIENT
Start: 2022-09-02

## 2022-09-02 RX ORDER — CLOBETASOL PROPIONATE 0.5 MG/G
CREAM TOPICAL
Qty: 60 G | Refills: 0 | Status: SHIPPED | OUTPATIENT
Start: 2022-09-02 | End: 2022-10-04

## 2022-09-02 RX ORDER — CHOLECALCIFEROL (VITAMIN D3) 50 MCG
2000 TABLET ORAL DAILY
Qty: 90 TABLET | Refills: 1 | Status: SHIPPED | OUTPATIENT
Start: 2022-09-02

## 2022-09-02 RX ORDER — INSULIN ASPART 100 [IU]/ML
6 INJECTION, SOLUTION INTRAVENOUS; SUBCUTANEOUS
Qty: 15 ML | Refills: 1 | Status: SHIPPED | OUTPATIENT
Start: 2022-09-02 | End: 2022-10-04

## 2022-09-02 NOTE — TELEPHONE ENCOUNTER
Noted  I was under the impression patient was purchasing it OTC  My bad, mouthwash has now been sent  Thanks!

## 2022-09-06 DIAGNOSIS — M31.31 GRANULOMATOSIS WITH POLYANGIITIS WITH RENAL INVOLVEMENT (HCC): ICD-10-CM

## 2022-09-06 DIAGNOSIS — G89.4 CHRONIC PAIN SYNDROME: ICD-10-CM

## 2022-09-06 PROBLEM — M85.80 OSTEOPENIA: Status: ACTIVE | Noted: 2022-09-06

## 2022-09-07 NOTE — ASSESSMENT & PLAN NOTE
- Review DEXA bone scan from 08/15/2022  Results show osteopenia  - Will start daily calcium and vitamin-D supplement

## 2022-09-07 NOTE — ASSESSMENT & PLAN NOTE
- Stable  Continue Wellbutrin, Adderall   - PDMP reviewed  No red flags noted  - Continue follow-up with therapist as scheduled

## 2022-09-08 ENCOUNTER — TELEPHONE (OUTPATIENT)
Dept: FAMILY MEDICINE CLINIC | Facility: CLINIC | Age: 52
End: 2022-09-08

## 2022-09-08 RX ORDER — OXYCODONE HYDROCHLORIDE 15 MG/1
15 TABLET ORAL EVERY 4 HOURS PRN
Qty: 180 TABLET | Refills: 0 | Status: SHIPPED | OUTPATIENT
Start: 2022-09-08 | End: 2022-10-04 | Stop reason: SDUPTHER

## 2022-09-08 NOTE — TELEPHONE ENCOUNTER
Pt called the office today and states the medication Oxycodone needs prior authorization, and the pharmacy faxed the form and the PCP didn't fill the form, pt states she will stop by the office to speak with a manager

## 2022-09-09 DIAGNOSIS — G89.4 CHRONIC PAIN SYNDROME: ICD-10-CM

## 2022-09-09 NOTE — TELEPHONE ENCOUNTER
Prior authorization has been completed and faxed successfully  Confirmation receipt received  Placed in pod 3 " " bin to be scanned into patient's chart  Thanks!

## 2022-09-12 DIAGNOSIS — F31.9 BIPOLAR 1 DISORDER (HCC): ICD-10-CM

## 2022-09-12 RX ORDER — DEXTROAMPHETAMINE SACCHARATE, AMPHETAMINE ASPARTATE MONOHYDRATE, DEXTROAMPHETAMINE SULFATE AND AMPHETAMINE SULFATE 5; 5; 5; 5 MG/1; MG/1; MG/1; MG/1
20 CAPSULE, EXTENDED RELEASE ORAL 2 TIMES DAILY
Qty: 60 CAPSULE | Refills: 0 | Status: SHIPPED | OUTPATIENT
Start: 2022-09-12 | End: 2022-10-12 | Stop reason: SDUPTHER

## 2022-09-12 RX ORDER — NALOXONE HYDROCHLORIDE 4 MG/.1ML
SPRAY NASAL
Qty: 1 EACH | Refills: 0 | Status: SHIPPED | OUTPATIENT
Start: 2022-09-12 | End: 2022-10-16

## 2022-09-14 ENCOUNTER — TELEPHONE (OUTPATIENT)
Dept: FAMILY MEDICINE CLINIC | Facility: CLINIC | Age: 52
End: 2022-09-14

## 2022-09-14 NOTE — TELEPHONE ENCOUNTER
Patient called asking for a blood pressure cuff that was discussed at her last office visit, and she hasn't received it yet  She would like to know if it can be sent to pharmacy  She's also asking her about her oxyCODONE (Roxicodone) 15 mg immediate release tablet, she said she has to pay out of pocket  Medication supposed have been prio-authorize  Please advise  Patient is upset  Please call patient

## 2022-09-15 ENCOUNTER — TELEPHONE (OUTPATIENT)
Dept: FAMILY MEDICINE CLINIC | Facility: CLINIC | Age: 52
End: 2022-09-15

## 2022-09-15 NOTE — TELEPHONE ENCOUNTER
PCP SIGNATURE NEEDED FOR [employer] FORM RECEIVED VIA FAX AND PLACED IN PCP FOLDER TO BE DELIVERED AT ASSIGNED TIMES        OxyCODONE - HCI - 15MG Tablets

## 2022-09-16 NOTE — TELEPHONE ENCOUNTER
Patient did send a message through [address] so I did answer there through there  However, it looks like patient did not read my message  Please relay the below message  In regards to the oxycodone, I will check about the prior authorization status  Thanks! Jose Westfall,   I sent a script to your pharmacy for a blood pressure monitor  Some pharmacies have them available to give to patients and others do not  If your pharmacy does not have it available, please let me know  Thanks!    Katarzyna

## 2022-09-19 NOTE — TELEPHONE ENCOUNTER
Pt advise 
Send over cream to the pharmacy  She should still follow up with OBGYN for a full evaluation 
18-Sep-2022 20:01
19-Sep-2022 10:58

## 2022-09-21 DIAGNOSIS — E11.22 TYPE 2 DIABETES MELLITUS WITH STAGE 4 CHRONIC KIDNEY DISEASE, UNSPECIFIED WHETHER LONG TERM INSULIN USE (HCC): ICD-10-CM

## 2022-09-21 DIAGNOSIS — N18.4 TYPE 2 DIABETES MELLITUS WITH STAGE 4 CHRONIC KIDNEY DISEASE, UNSPECIFIED WHETHER LONG TERM INSULIN USE (HCC): ICD-10-CM

## 2022-09-21 RX ORDER — BLOOD-GLUCOSE METER
KIT MISCELLANEOUS DAILY
Qty: 1 EACH | Refills: 0 | Status: SHIPPED | OUTPATIENT
Start: 2022-09-21

## 2022-09-21 NOTE — TELEPHONE ENCOUNTER
Pt called requesting a new blood glucose monitoring device stating that her old one has problems with battery       Please advise, Thanks

## 2022-09-22 NOTE — TELEPHONE ENCOUNTER
FORM TO [facility]  WAS FAXED ON 9/20/22 CONFIRMATION RECEIVED   SCANNED INTO CHART

## 2022-09-27 ENCOUNTER — OFFICE VISIT (OUTPATIENT)
Dept: OBGYN CLINIC | Facility: CLINIC | Age: 52
End: 2022-09-27

## 2022-09-27 VITALS
SYSTOLIC BLOOD PRESSURE: 108 MMHG | DIASTOLIC BLOOD PRESSURE: 72 MMHG | HEIGHT: 62 IN | BODY MASS INDEX: 26.83 KG/M2 | HEART RATE: 46 BPM | WEIGHT: 145.8 LBS

## 2022-09-27 DIAGNOSIS — R10.31 RLQ ABDOMINAL PAIN: Primary | ICD-10-CM

## 2022-09-27 DIAGNOSIS — R10.2 PELVIC PAIN: ICD-10-CM

## 2022-09-27 PROBLEM — F17.200 SMOKING: Status: RESOLVED | Noted: 2021-05-13 | Resolved: 2022-09-27

## 2022-09-27 PROCEDURE — 3078F DIAST BP <80 MM HG: CPT | Performed by: OBSTETRICS & GYNECOLOGY

## 2022-09-27 PROCEDURE — 3074F SYST BP LT 130 MM HG: CPT | Performed by: OBSTETRICS & GYNECOLOGY

## 2022-09-27 PROCEDURE — 99213 OFFICE O/P EST LOW 20 MIN: CPT | Performed by: OBSTETRICS & GYNECOLOGY

## 2022-09-27 NOTE — PROGRESS NOTES
Assessment/Plan:     No problem-specific Assessment & Plan notes found for this encounter  Diagnoses and all orders for this visit:    RLQ abdominal pain  -     US pelvis complete w transvaginal; Future    Pelvic pain  -     Ambulatory Referral to Obstetrics / Gynecology  -     US pelvis complete w transvaginal; Future      RTO         Subjective:      Patient ID: Munira Steele is a 46 y o  female who presents with pain with orgasm  At the peak of her orgasm she has right lower quadrant pain  The pain lasts about 5 minutes  She denies constipation  Menopause has been since September 2015  Her last pap was 05/12/21 and was negative with negative HPV  HPI    The following portions of the patient's history were reviewed and updated as appropriate: allergies, current medications, past family history, past medical history, past social history, past surgical history and problem list     Review of Systems      Objective:      /72   Pulse (!) 46   Ht 5' 2" (1 575 m)   Wt 66 1 kg (145 lb 12 8 oz)   LMP  (LMP Unknown)   BMI 26 67 kg/m²          Physical Exam  Vitals and nursing note reviewed  Constitutional:       Appearance: Normal appearance  Pulmonary:      Effort: Pulmonary effort is normal    Abdominal:      Palpations: Abdomen is soft  Tenderness: There is abdominal tenderness in the right lower quadrant  Neurological:      General: No focal deficit present  Mental Status: She is alert and oriented to person, place, and time     Psychiatric:         Mood and Affect: Mood normal          Behavior: Behavior normal

## 2022-09-28 ENCOUNTER — HOSPITAL ENCOUNTER (OUTPATIENT)
Dept: ULTRASOUND IMAGING | Facility: HOSPITAL | Age: 52
Discharge: HOME | End: 2022-09-28
Attending: OBSTETRICS & GYNECOLOGY
Payer: MEDICARE

## 2022-09-28 DIAGNOSIS — R10.2 PELVIC PAIN: ICD-10-CM

## 2022-09-28 DIAGNOSIS — R10.31 RLQ ABDOMINAL PAIN: ICD-10-CM

## 2022-09-28 PROCEDURE — 76830 TRANSVAGINAL US NON-OB: CPT

## 2022-09-28 PROCEDURE — 76856 US EXAM PELVIC COMPLETE: CPT

## 2022-10-03 DIAGNOSIS — Z79.4 TYPE 2 DIABETES MELLITUS WITHOUT COMPLICATION, WITH LONG-TERM CURRENT USE OF INSULIN (HCC): ICD-10-CM

## 2022-10-03 DIAGNOSIS — E11.9 TYPE 2 DIABETES MELLITUS WITHOUT COMPLICATION, WITH LONG-TERM CURRENT USE OF INSULIN (HCC): ICD-10-CM

## 2022-10-03 DIAGNOSIS — R21 RASH: ICD-10-CM

## 2022-10-04 ENCOUNTER — TELEPHONE (OUTPATIENT)
Dept: FAMILY MEDICINE CLINIC | Facility: CLINIC | Age: 52
End: 2022-10-04

## 2022-10-04 DIAGNOSIS — G89.4 CHRONIC PAIN SYNDROME: ICD-10-CM

## 2022-10-04 DIAGNOSIS — M31.31 GRANULOMATOSIS WITH POLYANGIITIS WITH RENAL INVOLVEMENT (HCC): ICD-10-CM

## 2022-10-04 RX ORDER — CLOBETASOL PROPIONATE 0.5 MG/G
CREAM TOPICAL
Qty: 60 G | Refills: 1 | Status: SHIPPED | OUTPATIENT
Start: 2022-10-04

## 2022-10-04 RX ORDER — INSULIN ASPART 100 [IU]/ML
INJECTION, SOLUTION INTRAVENOUS; SUBCUTANEOUS
Qty: 15 ML | Refills: 1 | Status: SHIPPED | OUTPATIENT
Start: 2022-10-04

## 2022-10-04 RX ORDER — OXYCODONE HYDROCHLORIDE 15 MG/1
15 TABLET ORAL EVERY 4 HOURS PRN
Qty: 180 TABLET | Refills: 0 | Status: SHIPPED | OUTPATIENT
Start: 2022-10-04

## 2022-10-04 NOTE — TELEPHONE ENCOUNTER
Pt left a msg in the nurse line requesting refill on Tizanidine  I didn't see this in the pt chart   Please advise

## 2022-10-05 ENCOUNTER — OFFICE VISIT (OUTPATIENT)
Dept: FAMILY MEDICINE CLINIC | Facility: CLINIC | Age: 52
End: 2022-10-05

## 2022-10-05 VITALS
SYSTOLIC BLOOD PRESSURE: 118 MMHG | HEIGHT: 62 IN | RESPIRATION RATE: 18 BRPM | HEART RATE: 72 BPM | TEMPERATURE: 97.8 F | DIASTOLIC BLOOD PRESSURE: 74 MMHG | BODY MASS INDEX: 26.67 KG/M2 | OXYGEN SATURATION: 98 %

## 2022-10-05 DIAGNOSIS — M54.12 CERVICAL RADICULOPATHY: Primary | ICD-10-CM

## 2022-10-05 DIAGNOSIS — M47.816 LUMBAR SPONDYLOSIS: ICD-10-CM

## 2022-10-05 DIAGNOSIS — M79.645 PAIN IN FINGER OF LEFT HAND: Primary | ICD-10-CM

## 2022-10-05 PROCEDURE — 3078F DIAST BP <80 MM HG: CPT | Performed by: FAMILY MEDICINE

## 2022-10-05 PROCEDURE — 99213 OFFICE O/P EST LOW 20 MIN: CPT | Performed by: FAMILY MEDICINE

## 2022-10-05 PROCEDURE — 3074F SYST BP LT 130 MM HG: CPT | Performed by: FAMILY MEDICINE

## 2022-10-05 RX ORDER — TIZANIDINE HYDROCHLORIDE 4 MG/1
4 CAPSULE, GELATIN COATED ORAL 3 TIMES DAILY
Qty: 90 CAPSULE | Refills: 2 | Status: SHIPPED | OUTPATIENT
Start: 2022-10-05

## 2022-10-05 NOTE — PROGRESS NOTES
Name: Maggie Gonzalez      : 1970      MRN: [id number]  Encounter Provider: Edward Sanabria MD  Encounter Date: 10/5/2022   Encounter department: [facility] [address]  Pain in finger of left hand  Assessment & Plan:  Pain in left index finger of 3 weeks rotation  No injury or trauma to the area   No erythema, warm, rash or signs of infection  The reported tenderness is not on the bone but on the muscle near the PIP     - votlaren gel PRN,   - exercise of the finger  - continue the patient's pain medication  -reevaluated in 6 weeks ( at patient's next schedule visit)     Orders:  -     Diclofenac Sodium (VOLTAREN) 1 %; Apply 2 g topically 4 (four) times a day         Subjective    Maggie Gonzalez is a 46 y o  female who present to the office for same day visit for left index finger pain    Hand Pain   There was no injury mechanism  The pain is present in the left fingers (left index finger)  The quality of the pain is described as stabbing  The pain does not radiate  The pain is at a severity of 5/10  The pain is mild  The pain has been constant since the incident  Associated symptoms include numbness and tingling  Pertinent negatives include no chest pain or muscle weakness  The symptoms are aggravated by palpation  Treatments tried: gabalin, percocet, hot water  The treatment provided mild relief  Review of Systems   Cardiovascular: Negative for chest pain  Neurological: Positive for tingling and numbness         Current Outpatient Medications on File Prior to Visit   Medication Sig    acetaminophen (TYLENOL) 500 mg tablet Take 2 tablets (1,000 mg total) by mouth every 8 (eight) hours as needed for mild pain (Patient not taking: No sig reported)    al mag oxide-diphenhydramine-lidocaine viscous (MAGIC MOUTHWASH) 1:1:1 suspension Swish and spit 10 mL every 4 (four) hours as needed for mouth pain or discomfort    albuterol (2 5 mg/3 mL) 0 083 % nebulizer solution USE 1 VIAL VIA NEBULIZER EVERY 6 HOURS AS NEEDED FOR WHEEZING OR SHORTNESS OF BREATH    albuterol (PROVENTIL HFA,VENTOLIN HFA) 90 mcg/act inhaler INHALE 2 PUFFS BY MOUTH EVERY 6 HOURS AS NEEDED FOR WHEEZING OR SHORTNESS OF BREATH    Alcohol Swabs (Alcohol Pads) 70 % PADS Use 4 (four) times a day    amphetamine-dextroamphetamine (ADDERALL XR) 20 MG 24 hr capsule Take 1 capsule (20 mg total) by mouth 2 (two) times a day Max Daily Amount: 40 mg    Blood Glucose Monitoring Suppl (FreeStyle Lite) DEIRDRE Use daily    Blood Pressure Monitor KIT Use daily to check blood pressure   buPROPion (WELLBUTRIN XL) 300 mg 24 hr tablet TAKE ONE TABLET BY MOUTH ONCE DAILY IN THE MORNING    calcium carbonate (OS-ALISIA) 600 MG tablet Take 1 tablet (600 mg total) by mouth daily    Cholecalciferol (Vitamin D) 50 MCG (2000 UT) tablet Take 1 tablet (2,000 Units total) by mouth daily    clobetasol (TEMOVATE) 0 05 % cream APPLY TOPICALLY TO AFFECTED AREA TWICE A DAY    dicyclomine (BENTYL) 20 mg tablet Take 1 tablet (20 mg total) by mouth in the morning and 1 tablet (20 mg total) in the evening  Do all this for 7 days      Easy Comfort Lancets MISC USE TO TEST THE BLOOD SUGAR THREE TIMES A DAY    glucose blood (FREESTYLE LITE) test strip USE TO TEST THE BLOOD SUGAR THREE TIMES A DAY AS DIRECTED    insulin glargine (Toujeo Max SoloStar) 300 units/mL CONCENTRATED U-300 injection pen (2-unit dial) Inject 22 Units under the skin daily    Insulin Pen Needle (Pen Needles) 31G X 8 MM MISC Inject under the skin 4 (four) times a day (with meals and at bedtime)    lidocaine (LMX) 4 % cream Apply topically as needed for mild pain    lidocaine (XYLOCAINE) 5 % ointment APPLY TOPICALLY 2GM TWICE A DAY TO AFFECTED AREAS AS NEEDED FOR MILD PAIN (Patient taking differently: Lidocaine patches)    Linzess 72 MCG CAPS TAKE ONE CAPSULE BY MOUTH ONCE DAILY    methocarbamol (ROBAXIN) 500 mg tablet Take 1 tablet (500 mg total) by mouth 2 (two) times a day    naloxone (Narcan) 4 mg/0 1 mL nasal spray ADMINISTER 1 SPRAY IN ONE NOSTRIL IF NO REPONSE AFTER 2-3 MINUTES SPRAY INTO OTHER NOSTRIL WITH NEW spray    [facility]    NovoLOG FlexPen 100 units/mL injection pen INJECT 6 UNITS UNDER THE SKIN THREE TIMES A DAY WITH MEALS    ondansetron (ZOFRAN) 8 mg tablet Take 1 tablet (8 mg total) by mouth every 8 (eight) hours as needed for nausea or vomiting    oxyCODONE (Roxicodone) 15 mg immediate release tablet Take 1 tablet (15 mg total) by mouth every 4 (four) hours as needed for moderate pain Max Daily Amount: 90 mg    pantoprazole (PROTONIX) 40 mg tablet Take 1 tablet (40 mg total) by mouth 2 (two) times a day before meals    pregabalin (LYRICA) 100 mg capsule Take 1 capsule (100 mg total) by mouth 3 (three) times a day    promethazine (PHENERGAN) 25 mg tablet Take 1 tablet (25 mg total) by mouth every 6 (six) hours as needed for nausea or vomiting    Restasis 0 05 % ophthalmic emulsion     scopolamine (TRANSDERM-SCOP) 1 5 mg/3 days TD 72 hr patch Place 1 patch on the skin every third day    sucralfate (CARAFATE) 1 g/10 mL suspension TAKE 10ML BY MOUTH THREE TIMES A DAY (Patient not taking: Reported on 8/4/2022)    sucralfate (CARAFATE) 1 g/10 mL suspension Take 10 mL (1 g total) by mouth in the morning and 10 mL (1 g total) at noon and 10 mL (1 g total) in the evening and 10 mL (1 g total) before bedtime  Do all this for 7 days   (Patient not taking: Reported on 8/4/2022)    Symbicort 80-4 5 MCG/ACT inhaler INHALE 2 PUFFS BY MOUTH TWICE A DAY RINSE MOUTH AFTER USE    [DISCONTINUED] Diclofenac Sodium (VOLTAREN) 1 % Apply 2 g topically 4 (four) times a day    [DISCONTINUED] Diclofenac Sodium (VOLTAREN) 1 % Apply 2 g topically 4 (four) times a day       Objective     /74 (BP Location: Left arm, Patient Position: Sitting, Cuff Size: Standard)   Pulse 72   Temp 97 8 °F (36 6 °C) (Temporal)   Resp 18   Ht 5' 2" (1 575 m)   LMP  (LMP Unknown)   SpO2 98%   BMI 26 67 kg/m²     Physical Exam  Zuhair Reynaga MD

## 2022-10-05 NOTE — PATIENT INSTRUCTIONS
Arthralgia   WHAT YOU NEED TO KNOW:   Arthralgia is pain in one or more joints, with no inflammation  It may be short-term and get better within 6 to 8 weeks  Arthralgia can be an early sign of arthritis  Arthralgia may be caused by a medical condition, such as a hormone disorder or a tumor  It may also be caused by an infection or injury  DISCHARGE INSTRUCTIONS:   Medicines: The following medicines may  be ordered for you:  Acetaminophen  decreases pain  Ask how much to take and how often to take it  Follow directions  Acetaminophen can cause liver damage if not taken correctly  NSAIDs  decrease pain and prevent swelling  Ask your healthcare provider which medicine is right for you  Ask how much to take and when to take it  Take as directed  NSAIDs can cause stomach bleeding and kidney problems if not taken correctly  Pain relief cream  decreases pain  Use this cream as directed  Take your medicine as directed  Contact your healthcare provider if you think your medicine is not helping or if you have side effects  Tell him of her if you are allergic to any medicine  Keep a list of the medicines, vitamins, and herbs you take  Include the amounts, and when and why you take them  Bring the list or the pill bottles to follow-up visits  Carry your medicine list with you in case of an emergency  Follow up with your healthcare provider or specialist as directed:  Write down your questions so you remember to ask them during your visits  Self-care:   Apply heat  to help decrease pain  Use a heating pad or heat wrap  Apply heat for 20 to 30 minutes every 2 hours for as many days as directed  Rest  as much as possible  Avoid activities that cause joint pain  Apply ice  to help decrease swelling and pain  Ice may also help prevent tissue damage  Use an ice pack, or put crushed ice in a plastic bag  Cover it with a towel and place it on your painful joint for 15 to 20 minutes every hour or as directed  Support  the joint with a brace or elastic wrap as directed  Elevate  your joint above the level of your heart as often as you can to help decrease swelling and pain  Prop your painful joint on pillows or blankets to keep it elevated comfortably  Lose weight  if you are overweight  Extra weight can put pressure on your joints and cause more pain  Ask your healthcare provider how much you should weigh  Ask him to help you create a weight loss plan  Exercise  regularly to help improve joint movement and to decrease pain  Ask about the best exercise plan for you  Low-impact exercises can help take the pressure off your joints  Examples are walking, swimming, and water aerobics  Physical therapy:  A physical therapist teaches you exercises to help improve movement and strength, and to decrease pain  Ask your healthcare provider if physical therapy is right for you  Contact your healthcare provider or specialist if:   You have a fever  You continue to have joint pain that cannot be relieved with heat, ice, or medicine  You have pain and inflammation around your joint  You have questions or concerns about your condition or care  Return to the emergency department if:   You have sudden, severe pain when you move your joint  You have a fever and shaking chills  You cannot move your joint  You lose feeling on the side of your body where you have the painful joint  © Copyright [employer] 2022 Information is for End User's use only and may not be sold, redistributed or otherwise used for commercial purposes  All illustrations and images included in CareNotes® are the copyrighted property of A D A M , Inc  or Luba Garcia  The above information is an  only  It is not intended as medical advice for individual conditions or treatments  Talk to your doctor, nurse or pharmacist before following any medical regimen to see if it is safe and effective for you

## 2022-10-05 NOTE — ASSESSMENT & PLAN NOTE
Pain in left index finger of 3 weeks rotation  No injury or trauma to the area   No erythema, warm, rash or signs of infection  The reported tenderness is not on the bone but on the muscle near the PIP     - votlaren gel PRN,   - exercise of the finger  - continue the patient's pain medication  -reevaluated in 6 weeks ( at patient's next schedule visit)

## 2022-10-06 NOTE — PROGRESS NOTES
Assessment/Plan:     No problem-specific Assessment & Plan notes found for this encounter  Diagnoses and all orders for this visit:    Encounter to discuss test results    Pelvic pain  -     Ambulatory Referral to Physical Therapy; Future    Urinary incontinence, mixed  -     Ambulatory Referral to Physical Therapy; Future  -     Ambulatory Referral to Obstetrics / Gynecology; Future    RTO prn  Subjective:      Patient ID: Josh Walls is a 46 y o  female presents for results review and concern  UTERUS:  The uterus is anteverted in position, measuring 4 6 x 2 2 x 2 6 cm  The uterus has a normal contour and echotexture  Fundal intramural myoma measures 0 7 x 0 5 x 0 7 cm  The cervix appears within normal limits      ENDOMETRIUM:    The endometrial echo complex has an AP caliber of 3 0 mm  Its appearance is within normal limits for age and cycle and shows no filling defects        OVARIES/ADNEXA:  Right ovary:  2 3 x 1 2 x 1 4 cm  2 1 mL  No suspicious right ovarian abnormality  Doppler flow within normal limits      Left ovary:  1 9 x 1 1 x 1 4 cm  1 5 mL  No suspicious left ovarian abnormality  A simple left paraovarian cyst is demonstrated measuring 2 4 x 1 9 x 2 5 cm  Doppler flow within normal limits      No suspicious adnexal mass or loculated collections  There is no free fluid      IMPRESSION:     Subcentimeter intramural myoma  2 4 cm left paraovarian cyst      The US was reviewed with the patient  She is changing her underwear 4 x a day  She has loss of urine without warning  She thought she was sweating and that was why her underwear was wet but she realized it was urine  She is waking up with wet underwear  She does not have an urge to go  She also has leakage from increased abdominal pressure such as sneezing  She states she does Kegel exercises daily and it has not helped  I provided her with written information on exercises  She is agreeable trying pelvic PT  Gastroenterology Progress Note    Identification: Annamarie Taylor, 63 year old, White, female   Consulting Provider: Jeyson Jamison MD    Subjective:       Subjective and Overnight Events:  No acute events overnight. Patient had dark brown \"mushy\" bowel movement yesterday but is not passing gas. Abdominal discomfort is unchanged, and the distention is slightly worsening.    Objective:     Temp:  [97.3 °F (36.3 °C)-97.7 °F (36.5 °C)] 97.5 °F (36.4 °C)  Heart Rate:  [78-88] 82  Resp:  [16-18] 16  BP: ()/(51-69) 98/62       Intake/Output Summary (Last 24 hours) at 10/19/2021 0603  Last data filed at 10/19/2021 0500  Gross per 24 hour   Intake --   Output 570 ml   Net -570 ml       Physical Exam:  General: No acute distress.  Eyes: No scleral icterus.  HENT: Moist oral mucosa. NG tube in place draining bilious fluid.  Neck: Supple, nontender.  Respiratory: Clear to auscultation bilaterally, BS equal.  Cardiovascular: Regular rate, regular rhythm.  Gastrointestinal: Distended. Hyperactive BS in RLQ & LLQ. Mildly tender diffusely. No guarding or rigidity.  Genitourinary: No CVA tenderness.  Musculoskeletal: No LE edema bilaterally.  Skin: Warm, dry.  Neurologic: Alert and oriented.    Labs:  Recent Labs   Lab 10/19/21  0339 10/18/21  0827 10/17/21  0321 10/16/21  0429 10/15/21  0458   SODIUM 136 133* 133* 133* 129*   POTASSIUM 4.1 4.3 3.3* 3.3* 4.4   CHLORIDE 104 98 99 99 96*   CO2 28 28 31 30 28   BUN 33* 33* 30* 34* 33*   CREATININE 0.99* 1.14* 0.94 1.25* 1.06*   CALCIUM 8.0* 8.8 7.4* 8.0* 7.7*   MG 2.0  --   --   --  2.1   PHOS 1.7*  --   --   --   --    AST 24 33  --  34 37   GPT 14 17  --  19 19   ALKPT 146* 172*  --  169* 170*   BILIRUBIN 2.2* 2.8*  --  1.8* 1.6*   TOTPROTEIN 4.6* 5.1*  --  4.9* 4.9*   ALBUMIN 1.4* 1.7*  --  1.7* 1.8*   GLOB 3.2 3.4  --  3.2 3.1   GLUCOSE 103* 93 99 108* 319*     Recent Labs   Lab 10/19/21  0339 10/18/21  0827 10/17/21  0321 10/15/21  0458   WBC 12.5* 15.7* 9.3 9.0   ASEG 11.3*  14.3*  --  6.9   RBC 3.69* 4.31 4.16 3.83*   HGB 12.1 14.3 13.7 12.3   HCT 34.8* 41.8 39.4 36.2    252 216 224     Recent Labs   Lab 10/17/21  1050   PT 17.1*   INR 1.6       Imaging:  XR CHEST POST TUBE OR LINE PLACEMENT 1 VIEW   Final Result by Kaela Centeno MD (10/18 2197)      As above.          Electronically Signed by: KAELA CENTENO M.D.    Signed on: 10/18/2021 1:57 AM          XR Abdomen Series W Single View Chest   Final Result by Dariel Mchugh MD (10/17 9618)          1.  Probable small bowel obstruction.   2.  No chest abnormality.         Electronically Signed by: DARIEL MCHUGH M.D.    Signed on: 10/17/2021 4:50 PM          US VASC PORTAL HEPATIC DUPLEX   Final Result by Elizabet Delatorre MD (10/16 3347)   The portal and hepatic veins are patent. Negative study for   thrombosis.      [facility] is fully accredited.      Electronically Signed by: DANYEL DELATORRE M.D.    Signed on: 10/16/2021 1:57 PM          US VASC LOWER EXTREMITY VENOUS DUPLEX BILATERAL   Final Result by Kaela Centeno MD (10/15 3398)      Negative bilateral lower extremity venous Doppler.      Electronically Signed by: KAELA CENTENO M.D.    Signed on: 10/15/2021 11:16 PM          Liver Elastography    (Results Pending)   CT ABDOMEN PELVIS WO CONTRAST    (Results Pending)   US GUIDED PARACENTESIS    (Results Pending)        Assessment and Plan     Impression:Annamarie Taylor is a 63 year old female with PMHx of breast cancer (diagnosed 7 years ago) with metastases to the liver and biliary tree who presents with diarrhea and intermittent nausea/vomiting/abdominal distention. GI was consulted for diarrhea. The patient's diarrhea has now resolved but she continues to have nausea, vomiting, and abdominal distention.    • Small bowel obstruction - etiology inflammation vs. intra-abdominal carcinomatosis less likely, no history of prior abdominal surgery  o XR Abdomen on 10/17 showed likely small bowel  HPI    The following portions of the patient's history were reviewed and updated as appropriate: allergies, current medications, past family history, past medical history, past social history, past surgical history and problem list     Review of Systems      Objective:      /80   Pulse 64   Wt 64 7 kg (142 lb 9 6 oz)   LMP  (LMP Unknown)   BMI 26 08 kg/m²          Physical Exam  Vitals and nursing note reviewed  Constitutional:       Appearance: Normal appearance  Pulmonary:      Effort: Pulmonary effort is normal    Neurological:      General: No focal deficit present  Mental Status: She is alert and oriented to person, place, and time     Psychiatric:         Mood and Affect: Mood normal          Behavior: Behavior normal  obstruction with dilated gas-filled loops of small with air-fluid levels, gas is seen within the colon and rectum, no free intraperitoneal gas.  o NG tube placed 10/18.  o CT Abdomen/Pelvis without contrast on 10/18 showed mid to distal small bowel obstruction, although an exact transition point is difficult to identify, small volume abdominal free fluid, no free air.  o Patient has slight leukocytosis, but she is hemodynamically stable.  • Acute diarrhea, resolved - likely drug induced (Xeloda) vs. infectious  o Infectious stool workup: WBC positive, Sodium 40, Potassium 46.2, Chloride 43, osmolality 457, C. Diff negative, O&P negative, GI pathogen panel negative  • Chronic liver disease, recurrent ascites - etiology likely related to malignancy and known liver metastases vs. underlying liver disease  o CT Abdomen/Pelvis on 10/18 showed cirrhotic hepatic morphology versus pseudocirrhosis in the setting of metastatic disease, hepatic hypodense lesions are better characterized on recent postcontrast CT, common bile duct stent in place.    o Paracentesis 10/18, s/p paracentesis on 10/7 and 9/21. Cytology has been negative for malignancy thus far.  o SAAG <1.0 suggestive of exudative effusion. Possible etiology consistent with malignancy.  o Liver elastography on 10/15 revealing SO, F4, suggestive of underlying fibrosis.  o Portal hepatic duplex on 10/15 showed patent portal and hepatic veins, negative for thrombosis.  o Alk phos elevated at 146 consistent with liver metastases.  • History of breast cancer - metastatic to liver and biliary tree  o S/p biliary stent placement on 8/20  • History of post-ERCP pancreatitis (8/20)  • Protein calorie malnutrition    Recommendations:  • Continue NPO with low suction through NG tube, IV fluids, monitor and correct electrolytes.  • General surgery following: recommend XR with water soluble contrast through NG tube on 10/20 or 10/21.  • Peritoneal fluid cytology  pending.  • Transjugular liver biopsy to evaluate chronic liver disease etiology not indicated at this time given acute obstruction. Will need to consider know liver metastases and difficulty obtaining biopsy of non-malignant tissue for possible future biopsy.  • Bacterial stool culture pending.    Patient was seen and examined with attending, Dr. Chau. Please refer to attending note or addendum for final recommendations.     JEFFREY Jolley-IV  10/19/21

## 2022-10-10 ENCOUNTER — TELEPHONE (OUTPATIENT)
Dept: FAMILY MEDICINE CLINIC | Facility: CLINIC | Age: 52
End: 2022-10-10

## 2022-10-10 NOTE — TELEPHONE ENCOUNTER
Patient called asking for a letter for 24 hour home care  Her coordinator would like it sent to her  Patient stated she had this letter done before  Ms Inderjit Prom would like a call back

## 2022-10-12 ENCOUNTER — TELEPHONE (OUTPATIENT)
Dept: OBGYN CLINIC | Facility: MEDICAL CENTER | Age: 52
End: 2022-10-12

## 2022-10-12 ENCOUNTER — OFFICE VISIT (OUTPATIENT)
Dept: OBGYN CLINIC | Facility: CLINIC | Age: 52
End: 2022-10-12

## 2022-10-12 VITALS
DIASTOLIC BLOOD PRESSURE: 80 MMHG | SYSTOLIC BLOOD PRESSURE: 125 MMHG | WEIGHT: 142.6 LBS | HEART RATE: 64 BPM | BODY MASS INDEX: 26.08 KG/M2

## 2022-10-12 DIAGNOSIS — N39.46 URINARY INCONTINENCE, MIXED: ICD-10-CM

## 2022-10-12 DIAGNOSIS — R10.2 PELVIC PAIN: ICD-10-CM

## 2022-10-12 DIAGNOSIS — F31.9 BIPOLAR 1 DISORDER (HCC): ICD-10-CM

## 2022-10-12 DIAGNOSIS — M31.31 GRANULOMATOSIS WITH POLYANGIITIS WITH RENAL INVOLVEMENT (HCC): ICD-10-CM

## 2022-10-12 DIAGNOSIS — Z71.2 ENCOUNTER TO DISCUSS TEST RESULTS: Primary | ICD-10-CM

## 2022-10-12 DIAGNOSIS — G89.4 CHRONIC PAIN SYNDROME: ICD-10-CM

## 2022-10-12 PROBLEM — R19.7 DIARRHEA OF PRESUMED INFECTIOUS ORIGIN: Status: RESOLVED | Noted: 2018-03-28 | Resolved: 2022-10-12

## 2022-10-12 PROCEDURE — 3079F DIAST BP 80-89 MM HG: CPT | Performed by: OBSTETRICS & GYNECOLOGY

## 2022-10-12 PROCEDURE — 99213 OFFICE O/P EST LOW 20 MIN: CPT | Performed by: OBSTETRICS & GYNECOLOGY

## 2022-10-12 PROCEDURE — 3074F SYST BP LT 130 MM HG: CPT | Performed by: OBSTETRICS & GYNECOLOGY

## 2022-10-12 RX ORDER — DEXTROAMPHETAMINE SACCHARATE, AMPHETAMINE ASPARTATE MONOHYDRATE, DEXTROAMPHETAMINE SULFATE AND AMPHETAMINE SULFATE 5; 5; 5; 5 MG/1; MG/1; MG/1; MG/1
20 CAPSULE, EXTENDED RELEASE ORAL 2 TIMES DAILY
Qty: 60 CAPSULE | Refills: 0 | Status: CANCELLED | OUTPATIENT
Start: 2022-10-12

## 2022-10-12 NOTE — TELEPHONE ENCOUNTER
Caller: Patient    Doctor: Jorge Cruz    Reason for call: returning call to [facility]    Call back#: n/a

## 2022-10-12 NOTE — TELEPHONE ENCOUNTER
Left a message asking pt to call the office to schedule an OVS with Andra Colvin in [place]  If she calls back please schedule her  good balance

## 2022-10-13 DIAGNOSIS — G89.4 CHRONIC PAIN SYNDROME: ICD-10-CM

## 2022-10-16 RX ORDER — NALOXONE HYDROCHLORIDE 4 MG/.1ML
SPRAY NASAL
Qty: 2 EACH | Refills: 2 | Status: SHIPPED | OUTPATIENT
Start: 2022-10-16

## 2022-10-17 ENCOUNTER — HOSPITAL ENCOUNTER (EMERGENCY)
Facility: HOSPITAL | Age: 52
Discharge: HOME | End: 2022-10-17
Attending: EMERGENCY MEDICINE
Payer: MEDICARE

## 2022-10-17 ENCOUNTER — HOSPITAL ENCOUNTER (EMERGENCY)
Facility: HOSPITAL | Age: 52
Discharge: HOME | End: 2022-10-17
Attending: EMERGENCY MEDICINE | Admitting: EMERGENCY MEDICINE
Payer: MEDICARE

## 2022-10-17 ENCOUNTER — APPOINTMENT (EMERGENCY)
Dept: CT IMAGING | Facility: HOSPITAL | Age: 52
End: 2022-10-17
Payer: MEDICARE

## 2022-10-17 VITALS
DIASTOLIC BLOOD PRESSURE: 117 MMHG | TEMPERATURE: 98 F | BODY MASS INDEX: 28.15 KG/M2 | HEART RATE: 60 BPM | RESPIRATION RATE: 20 BRPM | OXYGEN SATURATION: 99 % | SYSTOLIC BLOOD PRESSURE: 217 MMHG | WEIGHT: 153.88 LBS

## 2022-10-17 VITALS
DIASTOLIC BLOOD PRESSURE: 61 MMHG | BODY MASS INDEX: 28.15 KG/M2 | SYSTOLIC BLOOD PRESSURE: 192 MMHG | HEART RATE: 60 BPM | OXYGEN SATURATION: 98 % | TEMPERATURE: 97.6 F | RESPIRATION RATE: 20 BRPM | WEIGHT: 153.88 LBS

## 2022-10-17 DIAGNOSIS — R11.2 NAUSEA AND VOMITING: ICD-10-CM

## 2022-10-17 DIAGNOSIS — D35.02 ADRENAL ADENOMA, LEFT: ICD-10-CM

## 2022-10-17 DIAGNOSIS — K31.84 GASTROPARESIS: ICD-10-CM

## 2022-10-17 DIAGNOSIS — R10.13 EPIGASTRIC PAIN: Primary | ICD-10-CM

## 2022-10-17 DIAGNOSIS — K57.90 DIVERTICULOSIS: ICD-10-CM

## 2022-10-17 DIAGNOSIS — R10.9 ABDOMINAL PAIN: Primary | ICD-10-CM

## 2022-10-17 LAB
ALBUMIN SERPL BCP-MCNC: 3.9 G/DL (ref 3.5–5)
ALBUMIN SERPL BCP-MCNC: 4.3 G/DL (ref 3.5–5)
ALP SERPL-CCNC: 127 U/L (ref 46–116)
ALP SERPL-CCNC: 132 U/L (ref 46–116)
ALT SERPL W P-5'-P-CCNC: 20 U/L (ref 12–78)
ALT SERPL W P-5'-P-CCNC: 23 U/L (ref 12–78)
ANION GAP SERPL CALCULATED.3IONS-SCNC: 12 MMOL/L (ref 4–13)
ANION GAP SERPL CALCULATED.3IONS-SCNC: 18 MMOL/L (ref 4–13)
AST SERPL W P-5'-P-CCNC: 13 U/L (ref 5–45)
AST SERPL W P-5'-P-CCNC: 20 U/L (ref 5–45)
ATRIAL RATE: 54 BPM
BACTERIA UR QL AUTO: ABNORMAL /HPF
BASOPHILS # BLD AUTO: 0.01 THOUSANDS/ÂΜL (ref 0–0.1)
BASOPHILS # BLD MANUAL: 0 THOUSAND/UL (ref 0–0.1)
BASOPHILS NFR BLD AUTO: 0 % (ref 0–1)
BASOPHILS NFR MAR MANUAL: 0 % (ref 0–1)
BILIRUB SERPL-MCNC: 0.21 MG/DL (ref 0.2–1)
BILIRUB SERPL-MCNC: 0.39 MG/DL (ref 0.2–1)
BILIRUB UR QL STRIP: NEGATIVE
BUN SERPL-MCNC: 32 MG/DL (ref 5–25)
BUN SERPL-MCNC: 40 MG/DL (ref 5–25)
CALCIUM SERPL-MCNC: 10.1 MG/DL (ref 8.3–10.1)
CALCIUM SERPL-MCNC: 9.9 MG/DL (ref 8.3–10.1)
CHLORIDE SERPL-SCNC: 102 MMOL/L (ref 96–108)
CHLORIDE SERPL-SCNC: 107 MMOL/L (ref 96–108)
CLARITY UR: CLEAR
CO2 SERPL-SCNC: 21 MMOL/L (ref 21–32)
CO2 SERPL-SCNC: 23 MMOL/L (ref 21–32)
COLOR UR: YELLOW
CREAT SERPL-MCNC: 1.78 MG/DL (ref 0.6–1.3)
CREAT SERPL-MCNC: 1.81 MG/DL (ref 0.6–1.3)
EOSINOPHIL # BLD AUTO: 0.04 THOUSAND/ÂΜL (ref 0–0.61)
EOSINOPHIL # BLD MANUAL: 0 THOUSAND/UL (ref 0–0.4)
EOSINOPHIL NFR BLD AUTO: 1 % (ref 0–6)
EOSINOPHIL NFR BLD MANUAL: 0 % (ref 0–6)
ERYTHROCYTE [DISTWIDTH] IN BLOOD BY AUTOMATED COUNT: 13.2 % (ref 11.6–15.1)
ERYTHROCYTE [DISTWIDTH] IN BLOOD BY AUTOMATED COUNT: 13.3 % (ref 11.6–15.1)
EXT PREG TEST URINE: NORMAL
EXT. CONTROL ED NAV: NORMAL
GFR SERPL CREATININE-BSD FRML MDRD: 31 ML/MIN/1.73SQ M
GFR SERPL CREATININE-BSD FRML MDRD: 32 ML/MIN/1.73SQ M
GLUCOSE SERPL-MCNC: 140 MG/DL (ref 65–140)
GLUCOSE SERPL-MCNC: 185 MG/DL (ref 65–140)
GLUCOSE UR STRIP-MCNC: NEGATIVE MG/DL
HCT VFR BLD AUTO: 43.7 % (ref 34.8–46.1)
HCT VFR BLD AUTO: 47 % (ref 34.8–46.1)
HGB BLD-MCNC: 14.3 G/DL (ref 11.5–15.4)
HGB BLD-MCNC: 16 G/DL (ref 11.5–15.4)
HGB UR QL STRIP.AUTO: ABNORMAL
IMM GRANULOCYTES # BLD AUTO: 0.01 THOUSAND/UL (ref 0–0.2)
IMM GRANULOCYTES NFR BLD AUTO: 0 % (ref 0–2)
KETONES UR STRIP-MCNC: NEGATIVE MG/DL
LEUKOCYTE ESTERASE UR QL STRIP: NEGATIVE
LIPASE SERPL-CCNC: 295 U/L (ref 73–393)
LIPASE SERPL-CCNC: 349 U/L (ref 73–393)
LYMPHOCYTES # BLD AUTO: 0.5 THOUSAND/UL (ref 0.6–4.47)
LYMPHOCYTES # BLD AUTO: 2.31 THOUSANDS/ÂΜL (ref 0.6–4.47)
LYMPHOCYTES # BLD AUTO: 4 % (ref 14–44)
LYMPHOCYTES NFR BLD AUTO: 32 % (ref 14–44)
MCH RBC QN AUTO: 30.6 PG (ref 26.8–34.3)
MCH RBC QN AUTO: 30.6 PG (ref 26.8–34.3)
MCHC RBC AUTO-ENTMCNC: 32.7 G/DL (ref 31.4–37.4)
MCHC RBC AUTO-ENTMCNC: 34 G/DL (ref 31.4–37.4)
MCV RBC AUTO: 90 FL (ref 82–98)
MCV RBC AUTO: 93 FL (ref 82–98)
MONOCYTES # BLD AUTO: 0.59 THOUSAND/ÂΜL (ref 0.17–1.22)
MONOCYTES # BLD AUTO: 0.88 THOUSAND/UL (ref 0–1.22)
MONOCYTES NFR BLD AUTO: 8 % (ref 4–12)
MONOCYTES NFR BLD: 7 % (ref 4–12)
NEUTROPHILS # BLD AUTO: 4.3 THOUSANDS/ÂΜL (ref 1.85–7.62)
NEUTROPHILS # BLD MANUAL: 11.19 THOUSAND/UL (ref 1.85–7.62)
NEUTS BAND NFR BLD MANUAL: 1 % (ref 0–8)
NEUTS SEG NFR BLD AUTO: 59 % (ref 43–75)
NEUTS SEG NFR BLD AUTO: 88 % (ref 43–75)
NITRITE UR QL STRIP: NEGATIVE
NON-SQ EPI CELLS URNS QL MICRO: ABNORMAL /HPF
NRBC BLD AUTO-RTO: 0 /100 WBCS
P AXIS: 70 DEGREES
PH UR STRIP.AUTO: 5.5 [PH] (ref 4.5–8)
PLATELET # BLD AUTO: 268 THOUSANDS/UL (ref 149–390)
PLATELET # BLD AUTO: 309 THOUSANDS/UL (ref 149–390)
PLATELET BLD QL SMEAR: ADEQUATE
PMV BLD AUTO: 10.2 FL (ref 8.9–12.7)
PMV BLD AUTO: 10.7 FL (ref 8.9–12.7)
POTASSIUM SERPL-SCNC: 3.9 MMOL/L (ref 3.5–5.3)
POTASSIUM SERPL-SCNC: 4 MMOL/L (ref 3.5–5.3)
PR INTERVAL: 158 MS
PROT SERPL-MCNC: 8.7 G/DL (ref 6.4–8.4)
PROT SERPL-MCNC: 9.7 G/DL (ref 6.4–8.4)
PROT UR STRIP-MCNC: ABNORMAL MG/DL
QRS AXIS: 4 DEGREES
QRSD INTERVAL: 66 MS
QT INTERVAL: 434 MS
QTC INTERVAL: 411 MS
RBC # BLD AUTO: 4.68 MILLION/UL (ref 3.81–5.12)
RBC # BLD AUTO: 5.23 MILLION/UL (ref 3.81–5.12)
RBC #/AREA URNS AUTO: ABNORMAL /HPF
RBC MORPH BLD: NORMAL
SODIUM SERPL-SCNC: 141 MMOL/L (ref 135–147)
SODIUM SERPL-SCNC: 142 MMOL/L (ref 135–147)
SP GR UR STRIP.AUTO: 1.02 (ref 1–1.03)
T WAVE AXIS: 39 DEGREES
UROBILINOGEN UR QL STRIP.AUTO: 0.2 E.U./DL
VENTRICULAR RATE: 54 BPM
WBC # BLD AUTO: 12.57 THOUSAND/UL (ref 4.31–10.16)
WBC # BLD AUTO: 7.26 THOUSAND/UL (ref 4.31–10.16)
WBC #/AREA URNS AUTO: ABNORMAL /HPF

## 2022-10-17 PROCEDURE — 99285 EMERGENCY DEPT VISIT HI MDM: CPT

## 2022-10-17 PROCEDURE — 83690 ASSAY OF LIPASE: CPT

## 2022-10-17 PROCEDURE — 85025 COMPLETE CBC W/AUTO DIFF WBC: CPT | Performed by: EMERGENCY MEDICINE

## 2022-10-17 PROCEDURE — 99284 EMERGENCY DEPT VISIT MOD MDM: CPT

## 2022-10-17 PROCEDURE — 96361 HYDRATE IV INFUSION ADD-ON: CPT

## 2022-10-17 PROCEDURE — 81001 URINALYSIS AUTO W/SCOPE: CPT

## 2022-10-17 PROCEDURE — 80053 COMPREHEN METABOLIC PANEL: CPT | Performed by: EMERGENCY MEDICINE

## 2022-10-17 PROCEDURE — 93010 ELECTROCARDIOGRAM REPORT: CPT | Performed by: INTERNAL MEDICINE

## 2022-10-17 PROCEDURE — 96372 THER/PROPH/DIAG INJ SC/IM: CPT

## 2022-10-17 PROCEDURE — 83690 ASSAY OF LIPASE: CPT | Performed by: EMERGENCY MEDICINE

## 2022-10-17 PROCEDURE — 81025 URINE PREGNANCY TEST: CPT | Performed by: EMERGENCY MEDICINE

## 2022-10-17 PROCEDURE — 74177 CT ABD & PELVIS W/CONTRAST: CPT

## 2022-10-17 PROCEDURE — 36415 COLL VENOUS BLD VENIPUNCTURE: CPT | Performed by: EMERGENCY MEDICINE

## 2022-10-17 PROCEDURE — 99284 EMERGENCY DEPT VISIT MOD MDM: CPT | Performed by: EMERGENCY MEDICINE

## 2022-10-17 PROCEDURE — 85007 BL SMEAR W/DIFF WBC COUNT: CPT

## 2022-10-17 PROCEDURE — 85025 COMPLETE CBC W/AUTO DIFF WBC: CPT

## 2022-10-17 PROCEDURE — 80053 COMPREHEN METABOLIC PANEL: CPT

## 2022-10-17 PROCEDURE — 96374 THER/PROPH/DIAG INJ IV PUSH: CPT

## 2022-10-17 PROCEDURE — 85027 COMPLETE CBC AUTOMATED: CPT

## 2022-10-17 PROCEDURE — 99285 EMERGENCY DEPT VISIT HI MDM: CPT | Performed by: EMERGENCY MEDICINE

## 2022-10-17 PROCEDURE — 96375 TX/PRO/DX INJ NEW DRUG ADDON: CPT

## 2022-10-17 PROCEDURE — G1004 CDSM NDSC: HCPCS

## 2022-10-17 PROCEDURE — 93005 ELECTROCARDIOGRAM TRACING: CPT

## 2022-10-17 RX ORDER — HALOPERIDOL 5 MG/ML
2 INJECTION INTRAMUSCULAR ONCE
Status: COMPLETED | OUTPATIENT
Start: 2022-10-17 | End: 2022-10-17

## 2022-10-17 RX ORDER — METOCLOPRAMIDE HYDROCHLORIDE 5 MG/ML
10 INJECTION INTRAMUSCULAR; INTRAVENOUS ONCE
Status: COMPLETED | OUTPATIENT
Start: 2022-10-17 | End: 2022-10-17

## 2022-10-17 RX ORDER — IODIXANOL 320 MG/ML
100 INJECTION, SOLUTION INTRAVASCULAR
Status: COMPLETED | OUTPATIENT
Start: 2022-10-17 | End: 2022-10-17

## 2022-10-17 RX ORDER — ONDANSETRON 2 MG/ML
1 INJECTION INTRAMUSCULAR; INTRAVENOUS ONCE
Status: COMPLETED | OUTPATIENT
Start: 2022-10-17 | End: 2022-10-17

## 2022-10-17 RX ORDER — HALOPERIDOL 5 MG/ML
5 INJECTION INTRAMUSCULAR ONCE
Status: COMPLETED | OUTPATIENT
Start: 2022-10-17 | End: 2022-10-17

## 2022-10-17 RX ORDER — ONDANSETRON 4 MG/1
4 TABLET, ORALLY DISINTEGRATING ORAL EVERY 6 HOURS PRN
Qty: 20 TABLET | Refills: 0 | Status: SHIPPED | OUTPATIENT
Start: 2022-10-17 | End: 2022-10-26 | Stop reason: DRUGHIGH

## 2022-10-17 RX ADMIN — SODIUM CHLORIDE 1000 ML: 0.9 INJECTION, SOLUTION INTRAVENOUS at 21:10

## 2022-10-17 RX ADMIN — HALOPERIDOL LACTATE 2 MG: 5 INJECTION, SOLUTION INTRAMUSCULAR at 08:45

## 2022-10-17 RX ADMIN — HALOPERIDOL LACTATE 5 MG: 5 INJECTION, SOLUTION INTRAMUSCULAR at 21:21

## 2022-10-17 RX ADMIN — METOCLOPRAMIDE 10 MG: 5 INJECTION, SOLUTION INTRAMUSCULAR; INTRAVENOUS at 08:48

## 2022-10-17 RX ADMIN — IODIXANOL 100 ML: 320 INJECTION, SOLUTION INTRAVASCULAR at 09:58

## 2022-10-17 RX ADMIN — SODIUM CHLORIDE 1000 ML: 0.9 INJECTION, SOLUTION INTRAVENOUS at 08:44

## 2022-10-17 RX ADMIN — METOCLOPRAMIDE 10 MG: 5 INJECTION, SOLUTION INTRAMUSCULAR; INTRAVENOUS at 21:09

## 2022-10-17 NOTE — ED PROVIDER NOTES
History  Chief Complaint   Patient presents with   • Abdominal Pain     Pt brought by EMS from home for LUQ pain  Pt  Complaining of nausea/vomiting  HPI    80-year-old female, past medical history significant for bipolar, diabetes, GERD, CKD, presenting for evaluation of epigastric abdominal pain with associated nausea and vomiting  Symptoms began this morning  Patient is not taking medications for her symptoms  Cannot recall any worsening factors  States that she feels slightly better after vomiting  Has had pain similar to this in the past, patient is unsure of what her diagnosis at that time was but believes it was either gastroparesis or pancreatitis  Denies chest pain, shortness of breath, fever, chills, constipation, diarrhea, urinary symptoms  Denies any prior abdominal surgeries  Prior to Admission Medications   Prescriptions Last Dose Informant Patient Reported? Taking? Alcohol Swabs (Alcohol Pads) 70 % PADS   No No   Sig: Use 4 (four) times a day   Blood Glucose Monitoring Suppl (FreeStyle Lite) DEIRDRE   No No   Sig: Use daily   Blood Pressure Monitor KIT   No No   Sig: Use daily to check blood pressure     Cholecalciferol (Vitamin D) 50 MCG (2000 UT) tablet   No No   Sig: Take 1 tablet (2,000 Units total) by mouth daily   Diclofenac Sodium (VOLTAREN) 1 %   No No   Sig: Apply 2 g topically 4 (four) times a day   Easy Comfort Lancets MISC   No No   Sig: USE TO TEST THE BLOOD SUGAR THREE TIMES A DAY   Insulin Pen Needle (Pen Needles) 31G X 8 MM MISC   No No   Sig: Inject under the skin 4 (four) times a day (with meals and at bedtime)   Linzess 72 MCG CAPS   No No   Sig: TAKE ONE CAPSULE BY MOUTH ONCE DAILY   NON FORMULARY   Yes No   Sig: Medical marjuana   NovoLOG FlexPen 100 units/mL injection pen   No No   Sig: INJECT 6 UNITS UNDER THE SKIN THREE TIMES A DAY WITH MEALS   Restasis 0 05 % ophthalmic emulsion   Yes No   Symbicort 80-4 5 MCG/ACT inhaler   No No   Sig: INHALE 2 PUFFS BY MOUTH TWICE A DAY RINSE MOUTH AFTER USE   TiZANidine (ZANAFLEX) 4 MG capsule   No No   Sig: Take 1 capsule (4 mg total) by mouth 3 (three) times a day   acetaminophen (TYLENOL) 500 mg tablet   No No   Sig: Take 2 tablets (1,000 mg total) by mouth every 8 (eight) hours as needed for mild pain   al mag oxide-diphenhydramine-lidocaine viscous (MAGIC MOUTHWASH) 1:1:1 suspension   No No   Sig: Swish and spit 10 mL every 4 (four) hours as needed for mouth pain or discomfort   albuterol (2 5 mg/3 mL) 0 083 % nebulizer solution   No No   Sig: USE 1 VIAL VIA NEBULIZER EVERY 6 HOURS AS NEEDED FOR WHEEZING OR SHORTNESS OF BREATH   albuterol (PROVENTIL HFA,VENTOLIN HFA) 90 mcg/act inhaler   No No   Sig: INHALE 2 PUFFS BY MOUTH EVERY 6 HOURS AS NEEDED FOR WHEEZING OR SHORTNESS OF BREATH   amphetamine-dextroamphetamine (ADDERALL XR) 20 MG 24 hr capsule   No No   Sig: Take 1 capsule (20 mg total) by mouth 2 (two) times a day Max Daily Amount: 40 mg   buPROPion (WELLBUTRIN XL) 300 mg 24 hr tablet   No No   Sig: TAKE ONE TABLET BY MOUTH ONCE DAILY IN THE MORNING   calcium carbonate (OS-ALISIA) 600 MG tablet   No No   Sig: Take 1 tablet (600 mg total) by mouth daily   clobetasol (TEMOVATE) 0 05 % cream   No No   Sig: APPLY TOPICALLY TO AFFECTED AREA TWICE A DAY   dicyclomine (BENTYL) 20 mg tablet   No No   Sig: Take 1 tablet (20 mg total) by mouth in the morning and 1 tablet (20 mg total) in the evening  Do all this for 7 days     glucose blood (FREESTYLE LITE) test strip   No No   Sig: USE TO TEST THE BLOOD SUGAR THREE TIMES A DAY AS DIRECTED   insulin glargine (Toujeo Max SoloStar) 300 units/mL CONCENTRATED U-300 injection pen (2-unit dial)   No No   Sig: Inject 22 Units under the skin daily   lidocaine (LMX) 4 % cream   No No   Sig: Apply topically as needed for mild pain   lidocaine (XYLOCAINE) 5 % ointment   No No   Sig: APPLY TOPICALLY 2GM TWICE A DAY TO AFFECTED AREAS AS NEEDED FOR MILD PAIN   Patient taking differently: Lidocaine patches   naloxone (Narcan) 4 mg/0 1 mL nasal spray   No No   Sig: ADMINISTER 1 SPRAY IN ONE NOSTRIL IF NO REPONSE AFTER 2-3 MINUTES SPRAY INTO OTHER NOSTRIL WITH NEW spray   ondansetron (ZOFRAN) 8 mg tablet   No No   Sig: Take 1 tablet (8 mg total) by mouth every 8 (eight) hours as needed for nausea or vomiting   oxyCODONE (Roxicodone) 15 mg immediate release tablet   No No   Sig: Take 1 tablet (15 mg total) by mouth every 4 (four) hours as needed for moderate pain Max Daily Amount: 90 mg   pantoprazole (PROTONIX) 40 mg tablet   No No   Sig: Take 1 tablet (40 mg total) by mouth 2 (two) times a day before meals   pregabalin (LYRICA) 100 mg capsule   No No   Sig: Take 1 capsule (100 mg total) by mouth 3 (three) times a day   promethazine (PHENERGAN) 25 mg tablet   No No   Sig: Take 1 tablet (25 mg total) by mouth every 6 (six) hours as needed for nausea or vomiting   scopolamine (TRANSDERM-SCOP) 1 5 mg/3 days TD 72 hr patch   No No   Sig: Place 1 patch on the skin every third day   sucralfate (CARAFATE) 1 g/10 mL suspension   No No   Sig: TAKE 10ML BY MOUTH THREE TIMES A DAY   Patient not taking: No sig reported   sucralfate (CARAFATE) 1 g/10 mL suspension   No No   Sig: Take 10 mL (1 g total) by mouth in the morning and 10 mL (1 g total) at noon and 10 mL (1 g total) in the evening and 10 mL (1 g total) before bedtime  Do all this for 7 days  Patient not taking: Reported on 8/4/2022      Facility-Administered Medications: None       Past Medical History:   Diagnosis Date   • ADHD    • Anemia of chronic disease    • Anxiety    • Arthritis ? • Asthma    • Bipolar disorder ([facility] )    • Borderline personality disorder ([facility] )    • Cataplexy    • Chronic abdominal pain    • Chronic kidney disease ? • CKD (chronic kidney disease) stage 3, GFR 30-59 ml/min ([facility])    • Cushing syndrome ([facility])    • Depression ?    • Diabetes mellitus ([facility] )    • DVT (deep venous thrombosis) ([facility])    • GERD (gastroesophageal reflux disease) • Headache(784 0) 3 months   • History of acute pancreatitis     felt secondary to Bactrim   • History of transfusion    • Hypertension    • Liver disease     fatty liver   • Microscopic polyangiitis ([facility])    • Ovarian cyst    • PTSD (post-traumatic stress disorder)    • Self-inflicted injury     self inflicted skin wounds   • Sleep apnea    • Wegener's granulomatosis with renal involvement ([facility] ) 2015       Past Surgical History:   Procedure Laterality Date   • ESOPHAGOGASTRODUODENOSCOPY  09/11/2015    mild antral gastritis   • GASTRIC STIMULATOR IMPLANT SURGERY  06/25/2020   • ND COLONOSCOPY FLX DX W/COLLJ SPEC WHEN PFRMD N/A 12/14/2018    adenoma removed from the transverse, hyperplastic polyp removed from the left colon   • ND ESOPHAGOGASTRODUODENOSCOPY TRANSORAL DIAGNOSTIC N/A 12/14/2018    gastritis and scant coffee-ground material   Biopsies negative for H  pylori   • ND OPEN TX RADIAL & ULNAR SHAFT FX FIX RADIUS AND ULNA Left 6/23/2022    Procedure: OPEN REDUCTION W/ INTERNAL FIXATION (ORIF) RADIUS / ULNA (WRIST); Surgeon: Evan Briones MD;  Location: [facility];  Service: Orthopedics   • RELEASE SCAR CONTRACTURE / GRAFT REPAIRS OF HAND Bilateral    • UPPER GASTROINTESTINAL ENDOSCOPY  12/26/2019    Dr Tani Galvan  Botox to the pylorus       Family History   Problem Relation Age of Onset   • Arthritis Mother    • Depression Mother    • Diabetes Mother    • Mental illness Mother    • Migraines Mother    • No Known Problems Father    • Colon cancer Neg Hx    • Drug abuse Neg Hx         mother father   • Mental illness Neg Hx         disorder, mother father   • Cancer Neg Hx    • Breast cancer Neg Hx      I have reviewed and agree with the history as documented      E-Cigarette/Vaping   • E-Cigarette Use Never User      E-Cigarette/Vaping Substances   • Nicotine No    • THC No    • CBD No    • Flavoring No    • Other No    • Unknown No      Social History     Tobacco Use   • Smoking status: Former Smoker Packs/day: 1 00     Years: 10 00     Pack years: 10 00     Types: Cigarettes     Quit date: 2011     Years since quittin 8   • Smokeless tobacco: Never Used   • Tobacco comment: Stopped smoking 11 years ago   Vaping Use   • Vaping Use: Never used   Substance Use Topics   • Alcohol use: Never   • Drug use: Yes     Types: Marijuana     Comment: marijuana daily        Review of Systems   Constitutional: Negative for chills and fever  HENT: Negative for sore throat  Respiratory: Negative for cough and shortness of breath  Cardiovascular: Negative for chest pain, palpitations and leg swelling  Gastrointestinal: Positive for abdominal pain, nausea and vomiting  Negative for constipation and diarrhea  Genitourinary: Negative for dysuria and frequency  Musculoskeletal: Negative for myalgias  Skin: Negative for rash and wound  Neurological: Negative for dizziness, weakness, light-headedness, numbness and headaches  All other systems reviewed and are negative  Physical Exam  ED Triage Vitals [10/17/22 0831]   Temperature Pulse Respirations Blood Pressure SpO2   97 6 °F (36 4 °C) 68 20 (!) 207/180 100 %      Temp Source Heart Rate Source Patient Position - Orthostatic VS BP Location FiO2 (%)   Oral Monitor Lying Right arm --      Pain Score       10 - Worst Possible Pain             Orthostatic Vital Signs  Vitals:    10/17/22 0831 10/17/22 1005   BP: (!) 207/180 (!) 192/61   Pulse: 68 60   Patient Position - Orthostatic VS: Lying Lying       Physical Exam  Vitals and nursing note reviewed  Constitutional:       General: She is in acute distress (Uncomfortable appearing, rolling around in bed  )  Appearance: Normal appearance  She is well-developed  She is not ill-appearing or toxic-appearing  HENT:      Head: Normocephalic and atraumatic        Right Ear: External ear normal       Left Ear: External ear normal       Nose: Nose normal       Mouth/Throat:      Mouth: Mucous membranes are moist       Pharynx: Oropharynx is clear  Eyes:      General: No scleral icterus  Extraocular Movements: Extraocular movements intact  Cardiovascular:      Rate and Rhythm: Normal rate and regular rhythm  Pulses: Normal pulses  Pulmonary:      Effort: Pulmonary effort is normal  No respiratory distress  Breath sounds: Normal breath sounds  Abdominal:      General: Abdomen is flat  Palpations: Abdomen is soft  Tenderness: There is abdominal tenderness in the epigastric area  There is no right CVA tenderness, left CVA tenderness, guarding or rebound  Musculoskeletal:         General: Normal range of motion  Cervical back: Normal range of motion and neck supple  Skin:     General: Skin is warm  Capillary Refill: Capillary refill takes less than 2 seconds  Neurological:      General: No focal deficit present  Mental Status: She is alert and oriented to person, place, and time           ED Medications  Medications   ondansetron (FOR EMS ONLY) (ZOFRAN) 4 mg/2 mL injection 4 mg (0 mg Does not apply Given to EMS 10/17/22 0836)   metoclopramide (REGLAN) injection 10 mg (10 mg Intravenous Given 10/17/22 0848)   sodium chloride 0 9 % bolus 1,000 mL (0 mL Intravenous Stopped 10/17/22 1003)   haloperidol lactate (HALDOL) injection 2 mg (2 mg Intravenous Given 10/17/22 0845)   iodixanol (VISIPAQUE) 320 MG/ML injection 100 mL (100 mL Intravenous Given 10/17/22 0958)       Diagnostic Studies  Results Reviewed     Procedure Component Value Units Date/Time    Urine Microscopic [[id number]]  (Abnormal) Collected: 10/17/22 1007    Lab Status: Final result Specimen: Urine, Clean Catch Updated: 10/17/22 1045     RBC, UA None Seen /hpf      WBC, UA 1-2 /hpf      Epithelial Cells Occasional /hpf      Bacteria, UA Occasional /hpf     POCT pregnancy, urine [[id number]]  (Normal) Resulted: 10/17/22 1011    Lab Status: Final result Updated: 10/17/22 1011     EXT PREG TEST UR (Ref: Negative) Negative (-)     Control Valid    Urine Macroscopic, POC [[id number]]  (Abnormal) Collected: 10/17/22 1007    Lab Status: Final result Specimen: Urine Updated: 10/17/22 1010     Color, UA Yellow     Clarity, UA Clear     pH, UA 5 5     Leukocytes, UA Negative     Nitrite, UA Negative     Protein,  (2+) mg/dl      Glucose, UA Negative mg/dl      Ketones, UA Negative mg/dl      Urobilinogen, UA 0 2 E U /dl      Bilirubin, UA Negative     Occult Blood, UA Trace     Specific Horace, UA 1 020    Narrative:      CLINITEK RESULT    Comprehensive metabolic panel [[id number]]  (Abnormal) Collected: 10/17/22 0846    Lab Status: Final result Specimen: Blood from Arm, Left Updated: 10/17/22 0912     Sodium 142 mmol/L      Potassium 3 9 mmol/L      Chloride 107 mmol/L      CO2 23 mmol/L      ANION GAP 12 mmol/L      BUN 40 mg/dL      Creatinine 1 81 mg/dL      Glucose 140 mg/dL      Calcium 9 9 mg/dL      AST 13 U/L      ALT 20 U/L      Alkaline Phosphatase 127 U/L      Total Protein 8 7 g/dL      Albumin 3 9 g/dL      Total Bilirubin 0 21 mg/dL      eGFR 31 ml/min/1 73sq m     Narrative:      National Kidney Disease Foundation guidelines for Chronic Kidney Disease (CKD):   •  Stage 1 with normal or high GFR (GFR > 90 mL/min/1 73 square meters)  •  Stage 2 Mild CKD (GFR = 60-89 mL/min/1 73 square meters)  •  Stage 3A Moderate CKD (GFR = 45-59 mL/min/1 73 square meters)  •  Stage 3B Moderate CKD (GFR = 30-44 mL/min/1 73 square meters)  •  Stage 4 Severe CKD (GFR = 15-29 mL/min/1 73 square meters)  •  Stage 5 End Stage CKD (GFR <15 mL/min/1 73 square meters)  Note: GFR calculation is accurate only with a steady state creatinine    Lipase [[id number]]  (Normal) Collected: 10/17/22 0846    Lab Status: Final result Specimen: Blood from Arm, Left Updated: 10/17/22 0912     Lipase 349 u/L     CBC and differential [[id number]] Collected: 10/17/22 0846    Lab Status: Final result Specimen: Blood from Arm, Left Updated: 10/17/22 3513 WBC 7 26 Thousand/uL      RBC 4 68 Million/uL      Hemoglobin 14 3 g/dL      Hematocrit 43 7 %      MCV 93 fL      MCH 30 6 pg      MCHC 32 7 g/dL      RDW 13 3 %      MPV 10 2 fL      Platelets 040 Thousands/uL      nRBC 0 /100 WBCs      Neutrophils Relative 59 %      Immat GRANS % 0 %      Lymphocytes Relative 32 %      Monocytes Relative 8 %      Eosinophils Relative 1 %      Basophils Relative 0 %      Neutrophils Absolute 4 30 Thousands/µL      Immature Grans Absolute 0 01 Thousand/uL      Lymphocytes Absolute 2 31 Thousands/µL      Monocytes Absolute 0 59 Thousand/µL      Eosinophils Absolute 0 04 Thousand/µL      Basophils Absolute 0 01 Thousands/µL                  CT abdomen pelvis with contrast   Final Result by Kit Malave MD (10/17 1038)      Colonic diverticulosis  Stable left adrenal adenoma  Simple left paraovarian cyst unchanged from multiple prior studies  Workstation performed: [id number]               Procedures  Procedures      ED Course  ED Course as of 10/17/22 1105   Mon Oct 17, 2022   4529 ECG 12 lead  Procedure Note: EKG  Date/Time: 10/17/22 8:58 AM   Interpreted by: Aviva Waite DO  Indications / Diagnosis: Epigastric pain  ECG reviewed by me, the ED Physician: yes   The EKG demonstrates:  Rhythm: normal sinus  Intervals: normal intervals  Axis: normal axis  QRS/Blocks: normal QRS  ST Changes: No acute ST Changes, no STD/YOLANDA  No change from prior from July 2022       0933 Creatinine(!): 1 81  Baseline   0933 Lipase: 349                             SBIRT 22yo+    Flowsheet Row Most Recent Value   SBIRT (23 yo +)    In order to provide better care to our patients, we are screening all of our patients for alcohol and drug use  Would it be okay to ask you these screening questions?  Unable to answer at this time Filed at: 10/17/2022 0907                [facility]  Number of Diagnoses or Management Options  Adrenal adenoma, left  Diverticulosis  Epigastric pain  Nausea and vomiting  Diagnosis management comments: 19-year-old female presenting for evaluation of epigastric with associated nausea and vomiting  On exam the patient very uncomfortable appearing, actively vomiting, is tender in the epigastric area without rebound or guarding  My differential diagnosis includes but is not limited to pancreatitis, gallbladder pathology, gastritis, gastroparesis  Plan: CBC, CMP, lipase, , UA, urine preg, symptom control, IV fluids, CT abdomen pelvis  Patient's lab work is unremarkable  CT abdomen pelvis shows chronic findings but no acute with clinician for symptoms  I discussed these results with the patient and her caregiver, patient reports symptomatic improvement following medication administration  Patient is stable for discharge with anticipatory guidance, outpatient follow-up and strict return to ED precautions  All questions were answered at this time  I reviewed all testing with the patient: see above  I gave oral return precautions for what to return for in addition to the written return precautions  The patient (and any family present: caregiver) verbalized understanding of the discharge instructions and warnings that would necessitate return to the Emergency Department  I specifically highlighted areas of special concern regarding the written and verbal discharge instructions and return precautions  All questions were answered prior to discharge          Disposition  Final diagnoses:   Epigastric pain   Nausea and vomiting   Diverticulosis   Adrenal adenoma, left     Time reflects when diagnosis was documented in both MDM as applicable and the Disposition within this note     Time User Action Codes Description Comment    10/17/2022 10:54 AM Rosalina Moralez Add [R10 13] Epigastric pain     10/17/2022 10:55 AM Rosalina Combsa Add [R11 2] Nausea and vomiting     10/17/2022 10:57 AM Rosalina Moralez Add [K57 90] Diverticulosis     10/17/2022 10:57 AM Thompson Bread Add [D35 02] Adrenal adenoma, left     10/17/2022 10:57 AM Thompson Bread Add [R77 461] Torsion of paraovarian cyst     10/17/2022 10:58 AM Thompson Bread Remove [O13 074] Torsion of paraovarian cyst     10/17/2022 10:58 AM Thompson Bread Add [Y69 247] Torsion of paraovarian cyst     10/17/2022 10:58 AM Thompson Bread Remove [W94 977] Torsion of paraovarian cyst       ED Disposition     ED Disposition   Discharge    Condition   Stable    Date/Time   Mon Oct 17, 2022 10:55 AM    Comment   Iza Isai discharge to home/self care  Follow-up Information     Follow up With Specialties Details Why Contact Info Additional Information    [facility]  For Emergency Department Follow-up [address]  [address] [facility]  [place]   [phone]  [phone]  [phone]       [address] [facility] Emergency Department Emergency Medicine  If symptoms worsen [facility] [id number]  [address] [facility] Emergency Department, [address] [place] [address], [id number]          Patient's Medications   Discharge Prescriptions    ONDANSETRON (ZOFRAN ODT) 4 MG DISINTEGRATING TABLET    Take 1 tablet (4 mg total) by mouth every 6 (six) hours as needed for nausea or vomiting       Start Date: 10/17/2022End Date: --       Order Dose: 4 mg       Quantity: 20 tablet    Refills: 0     No discharge procedures on file  PDMP Review       Value Time User    PDMP Reviewed  Yes 10/12/2022  3:44 PM Onelia Jiang PA-C           ED Provider  Attending physically available and evaluated Iza Radford  I managed the patient along with the ED Attending      Electronically Signed by         Clarisse Mercado DO  10/17/22 4056

## 2022-10-17 NOTE — DISCHARGE INSTRUCTIONS
Your CT scan showed: Colonic diverticulosis  Stable left adrenal adenoma  Simple left paraovarian cyst unchanged from multiple prior studies  Please use the following pain medications as prescribed:  - Tylenol 650mg every 6 hours    Prescription for zofran was sent to your pharmacy, take this as needed for your nausea  Follow up with your primary care physician, return to the ED if symptoms worsen

## 2022-10-17 NOTE — ED ATTENDING ATTESTATION
10/17/2022  I, Susie Cardona MD, saw and evaluated the patient  I have discussed the patient with the resident/non-physician practitioner and agree with the resident's/non-physician practitioner's findings, Plan of Care, and MDM as documented in the resident's/non-physician practitioner's note, except where noted  All available labs and Radiology studies were reviewed  I was present for key portions of any procedure(s) performed by the resident/non-physician practitioner and I was immediately available to provide assistance  At this point I agree with the current assessment done in the Emergency Department  I have conducted an independent evaluation of this patient a history and physical is as follows:    45 YO female presents with LUQ abdominal pain for the last few hours with nausea and vomiting  States Hx of similar in the past  Pt denies CP/SOB/F/C/N/V/D/C, no dysuria, burning on urination or blood in urine  Gen: Pt is in NAD, uncomfotable  HEENT: Head is atraumatic, EOM's intact, neck has FROM  Chest: CTAB, non-tender  Heart: RRR  Abdomen: Soft, Tender in the epigastrium  Musculoskeletal: FROM in all extremities  Skin: No rash, no ecchymosis  Neuro: Awake, alert, oriented x4; Cranial nerves II-XII intact  Psych: Normal affect    MDM -  Pt with Hx of similar  Will check urine for infection and pregnancy, CBC for leukocytosis, metabolic panel for electrolyte abnormalities and dehydration,  LFT's to assess GB dysfunction, lipase for pancreatitis  Will CT abdomen, provide IV hydration and antiemetics       ED Course         Critical Care Time  Procedures

## 2022-10-18 DIAGNOSIS — F32.A DEPRESSION, UNSPECIFIED DEPRESSION TYPE: ICD-10-CM

## 2022-10-18 RX ORDER — BUPROPION HYDROCHLORIDE 300 MG/1
TABLET ORAL
Qty: 30 TABLET | Refills: 0 | Status: SHIPPED | OUTPATIENT
Start: 2022-10-18

## 2022-10-18 NOTE — ED PROVIDER NOTES
History  Chief Complaint   Patient presents with   • Abdominal Pain     Same as this morning when seen here except now with increased pain and cramping  49-year-old female patient with history of diabetes, CKD, pancreatitis, gastroparesis presenting with epigastric abdominal pain and nausea vomiting onset today  Patient states this is typical of her previous gastroparesis episodes  Patient was seen earlier today in the ED and was given Reglan and Haldol for symptoms  Patient had a CT abdomen pelvis that was negative for any acute processes  Denies fever, chest pain, no diarrhea, urinary symptoms, fevers  Patient states her pain is sharp and constant  Patient states that she has been having vomiting and cannot keep anything down  States has not been able to take medications because of her pain  Patient states she smokes marijuana daily  Prior to Admission Medications   Prescriptions Last Dose Informant Patient Reported? Taking? Alcohol Swabs (Alcohol Pads) 70 % PADS   No No   Sig: Use 4 (four) times a day   Blood Glucose Monitoring Suppl (FreeStyle Lite) DEIRDRE   No No   Sig: Use daily   Blood Pressure Monitor KIT   No No   Sig: Use daily to check blood pressure     Cholecalciferol (Vitamin D) 50 MCG (2000 UT) tablet   No No   Sig: Take 1 tablet (2,000 Units total) by mouth daily   Diclofenac Sodium (VOLTAREN) 1 %   No No   Sig: Apply 2 g topically 4 (four) times a day   Easy Comfort Lancets MISC   No No   Sig: USE TO TEST THE BLOOD SUGAR THREE TIMES A DAY   Insulin Pen Needle (Pen Needles) 31G X 8 MM MISC   No No   Sig: Inject under the skin 4 (four) times a day (with meals and at bedtime)   Linzess 72 MCG CAPS   No No   Sig: TAKE ONE CAPSULE BY MOUTH ONCE DAILY   NON FORMULARY   Yes No   Sig: Medical marjuana   NovoLOG FlexPen 100 units/mL injection pen   No No   Sig: INJECT 6 UNITS UNDER THE SKIN THREE TIMES A DAY WITH MEALS   Restasis 0 05 % ophthalmic emulsion   Yes No   Symbicort 80-4 5 MCG/ACT inhaler   No No   Sig: INHALE 2 PUFFS BY MOUTH TWICE A DAY RINSE MOUTH AFTER USE   TiZANidine (ZANAFLEX) 4 MG capsule   No No   Sig: Take 1 capsule (4 mg total) by mouth 3 (three) times a day   acetaminophen (TYLENOL) 500 mg tablet   No No   Sig: Take 2 tablets (1,000 mg total) by mouth every 8 (eight) hours as needed for mild pain   al mag oxide-diphenhydramine-lidocaine viscous (MAGIC MOUTHWASH) 1:1:1 suspension   No No   Sig: Swish and spit 10 mL every 4 (four) hours as needed for mouth pain or discomfort   albuterol (2 5 mg/3 mL) 0 083 % nebulizer solution   No No   Sig: USE 1 VIAL VIA NEBULIZER EVERY 6 HOURS AS NEEDED FOR WHEEZING OR SHORTNESS OF BREATH   albuterol (PROVENTIL HFA,VENTOLIN HFA) 90 mcg/act inhaler   No No   Sig: INHALE 2 PUFFS BY MOUTH EVERY 6 HOURS AS NEEDED FOR WHEEZING OR SHORTNESS OF BREATH   amphetamine-dextroamphetamine (ADDERALL XR) 20 MG 24 hr capsule   No No   Sig: Take 1 capsule (20 mg total) by mouth 2 (two) times a day Max Daily Amount: 40 mg   buPROPion (WELLBUTRIN XL) 300 mg 24 hr tablet   No No   Sig: TAKE ONE TABLET BY MOUTH ONCE DAILY IN THE MORNING   calcium carbonate (OS-ALISIA) 600 MG tablet   No No   Sig: Take 1 tablet (600 mg total) by mouth daily   clobetasol (TEMOVATE) 0 05 % cream   No No   Sig: APPLY TOPICALLY TO AFFECTED AREA TWICE A DAY   dicyclomine (BENTYL) 20 mg tablet   No No   Sig: Take 1 tablet (20 mg total) by mouth in the morning and 1 tablet (20 mg total) in the evening  Do all this for 7 days     glucose blood (FREESTYLE LITE) test strip   No No   Sig: USE TO TEST THE BLOOD SUGAR THREE TIMES A DAY AS DIRECTED   insulin glargine (Toujeo Max SoloStar) 300 units/mL CONCENTRATED U-300 injection pen (2-unit dial)   No No   Sig: Inject 22 Units under the skin daily   lidocaine (LMX) 4 % cream   No No   Sig: Apply topically as needed for mild pain   lidocaine (XYLOCAINE) 5 % ointment   No No   Sig: APPLY TOPICALLY 2GM TWICE A DAY TO AFFECTED AREAS AS NEEDED FOR MILD PAIN   Patient taking differently: Lidocaine patches   naloxone (Narcan) 4 mg/0 1 mL nasal spray   No No   Sig: ADMINISTER 1 SPRAY IN ONE NOSTRIL IF NO REPONSE AFTER 2-3 MINUTES SPRAY INTO OTHER NOSTRIL WITH NEW spray   ondansetron (ZOFRAN) 8 mg tablet   No No   Sig: Take 1 tablet (8 mg total) by mouth every 8 (eight) hours as needed for nausea or vomiting   ondansetron (Zofran ODT) 4 mg disintegrating tablet   No No   Sig: Take 1 tablet (4 mg total) by mouth every 6 (six) hours as needed for nausea or vomiting   oxyCODONE (Roxicodone) 15 mg immediate release tablet   No No   Sig: Take 1 tablet (15 mg total) by mouth every 4 (four) hours as needed for moderate pain Max Daily Amount: 90 mg   pantoprazole (PROTONIX) 40 mg tablet   No No   Sig: Take 1 tablet (40 mg total) by mouth 2 (two) times a day before meals   pregabalin (LYRICA) 100 mg capsule   No No   Sig: Take 1 capsule (100 mg total) by mouth 3 (three) times a day   promethazine (PHENERGAN) 25 mg tablet   No No   Sig: Take 1 tablet (25 mg total) by mouth every 6 (six) hours as needed for nausea or vomiting   scopolamine (TRANSDERM-SCOP) 1 5 mg/3 days TD 72 hr patch   No No   Sig: Place 1 patch on the skin every third day   sucralfate (CARAFATE) 1 g/10 mL suspension   No No   Sig: TAKE 10ML BY MOUTH THREE TIMES A DAY   Patient not taking: No sig reported   sucralfate (CARAFATE) 1 g/10 mL suspension   No No   Sig: Take 10 mL (1 g total) by mouth in the morning and 10 mL (1 g total) at noon and 10 mL (1 g total) in the evening and 10 mL (1 g total) before bedtime  Do all this for 7 days  Patient not taking: Reported on 8/4/2022      Facility-Administered Medications: None       Past Medical History:   Diagnosis Date   • ADHD    • Anemia of chronic disease    • Anxiety    • Arthritis ? • Asthma    • Bipolar disorder ([facility] )    • Borderline personality disorder ([facility] )    • Cataplexy    • Chronic abdominal pain    • Chronic kidney disease ?    • CKD (chronic kidney disease) stage 3, GFR 30-59 ml/min (HCC)    • Cushing syndrome (HCC)    • Depression ? • Diabetes mellitus ([facility] )    • DVT (deep venous thrombosis) (HCC)    • GERD (gastroesophageal reflux disease)    • Headache(784 0) 3 months   • History of acute pancreatitis     felt secondary to Bactrim   • History of transfusion    • Hypertension    • Liver disease     fatty liver   • Microscopic polyangiitis (HCC)    • Ovarian cyst    • PTSD (post-traumatic stress disorder)    • Self-inflicted injury     self inflicted skin wounds   • Sleep apnea    • Wegener's granulomatosis with [facility] involvement ([facility] 2015       Past Surgical History:   Procedure Laterality Date   • ESOPHAGOGASTRODUODENOSCOPY  09/11/2015    mild antral gastritis   • GASTRIC STIMULATOR IMPLANT SURGERY  06/25/2020   • [place] COLONOSCOPY FLX DX W/COLLJ SPEC WHEN PFRMD N/A 12/14/2018    adenoma removed from the transverse, hyperplastic polyp removed from the left colon   • [place] ESOPHAGOGASTRODUODENOSCOPY TRANSORAL DIAGNOSTIC N/A 12/14/2018    gastritis and scant coffee-ground material   Biopsies negative for H  pylori   • [place] OPEN TX RADIAL & ULNAR SHAFT FX FIX RADIUS AND ULNA Left 6/23/2022    Procedure: OPEN REDUCTION W/ INTERNAL FIXATION (ORIF) RADIUS / ULNA (WRIST); Surgeon: Charles Maxwell MD;  Location: [facility];  Service: Orthopedics   • RELEASE SCAR CONTRACTURE / GRAFT REPAIRS OF HAND Bilateral    • UPPER GASTROINTESTINAL ENDOSCOPY  12/26/2019    Dr Sid Mosley  Botox to the pylorus       Family History   Problem Relation Age of Onset   • Arthritis Mother    • Depression Mother    • Diabetes Mother    • Mental illness Mother    • Migraines Mother    • No Known Problems Father    • Colon cancer Neg Hx    • Drug abuse Neg Hx         mother father   • Mental illness Neg Hx         disorder, mother father   • Cancer Neg Hx    • Breast cancer Neg Hx      I have reviewed and agree with the history as documented      E-Cigarette/Vaping   • E-Cigarette Use Never User E-Cigarette/Vaping Substances   • Nicotine No    • THC No    • CBD No    • Flavoring No    • Other No    • Unknown No      Social History     Tobacco Use   • Smoking status: Former Smoker     Packs/day:  00     Years: 10      Pack years: 10 00     Types: Cigarettes     Quit date: 2011     Years since quittin 8   • Smokeless tobacco: Never Used   • Tobacco comment: Stopped smoking 11 years ago   Vaping Use   • Vaping Use: Never used   Substance Use Topics   • Alcohol use: Never   • Drug use: Yes     Types: Marijuana     Comment: marijuana daily        Review of Systems   Constitutional: Positive for appetite change  Negative for chills, fatigue and fever  HENT: Negative for congestion, rhinorrhea and sore throat  Respiratory: Negative for cough, chest tightness and shortness of breath  Cardiovascular: Negative for chest pain and leg swelling  Gastrointestinal: Positive for abdominal pain, nausea and vomiting  Negative for abdominal distention and diarrhea  Genitourinary: Negative for difficulty urinating and dysuria  Musculoskeletal: Negative for arthralgias, back pain and myalgias  Skin: Negative for rash and wound  Neurological: Negative for dizziness, weakness and headaches  All other systems reviewed and are negative  Physical Exam  ED Triage Vitals [10/17/22 2031]   Temperature Pulse Respirations Blood Pressure SpO2   98 °F (36 7 °C) 60 20 (!) 217/117 99 %      Temp src Heart Rate Source Patient Position - Orthostatic VS BP Location FiO2 (%)   -- Monitor Lying Right arm --      Pain Score       No Pain             Orthostatic Vital Signs  Vitals:    10/17/22 2031   BP: (!) 217/117   Pulse: 60   Patient Position - Orthostatic VS: Lying       Physical Exam  Vitals reviewed  Constitutional:       Appearance: Normal appearance  HENT:      Head: Normocephalic and atraumatic        Nose: Nose normal       Mouth/Throat:      Mouth: Mucous membranes are moist       Pharynx: Oropharynx is clear  Eyes:      Extraocular Movements: Extraocular movements intact  Conjunctiva/sclera: Conjunctivae normal    Cardiovascular:      Rate and Rhythm: Normal rate and regular rhythm  Pulses: Normal pulses  Heart sounds: Normal heart sounds  Pulmonary:      Effort: Pulmonary effort is normal       Breath sounds: Normal breath sounds  Abdominal:      General: A surgical scar is present  Bowel sounds are normal       Palpations: Abdomen is soft  Tenderness: There is abdominal tenderness in the epigastric area  Musculoskeletal:         General: Normal range of motion  Cervical back: Normal range of motion  Skin:     General: Skin is warm and dry  Neurological:      General: No focal deficit present  Mental Status: She is alert and oriented to person, place, and time  Mental status is at baseline           ED Medications  Medications   metoclopramide (REGLAN) injection 10 mg (10 mg Intravenous Given 10/17/22 2109)   sodium chloride 0 9 % bolus 1,000 mL (0 mL Intravenous Stopped 10/17/22 2207)   haloperidol lactate (HALDOL) injection 5 mg (5 mg Intramuscular Given 10/17/22 2121)       Diagnostic Studies  Results Reviewed     Procedure Component Value Units Date/Time    Comprehensive metabolic panel [[id number]]  (Abnormal) Collected: 10/17/22 2106    Lab Status: Final result Specimen: Blood from Arm, Right Updated: 10/17/22 2158     Sodium 141 mmol/L      Potassium 4 0 mmol/L      Chloride 102 mmol/L      CO2 21 mmol/L      ANION GAP 18 mmol/L      BUN 32 mg/dL      Creatinine 1 78 mg/dL      Glucose 185 mg/dL      Calcium 10 1 mg/dL      AST 20 U/L      ALT 23 U/L      Alkaline Phosphatase 132 U/L      Total Protein 9 7 g/dL      Albumin 4 3 g/dL      Total Bilirubin 0 39 mg/dL      eGFR 32 ml/min/1 73sq m     Narrative:      [place] guidelines for Chronic Kidney Disease (CKD):   •  Stage 1 with normal or high GFR (GFR > 90 mL/min/1 73 square meters)  •  Stage 2 Mild CKD (GFR = 60-89 mL/min/1 73 square meters)  •  Stage 3A Moderate CKD (GFR = 45-59 mL/min/1 73 square meters)  •  Stage 3B Moderate CKD (GFR = 30-44 mL/min/1 73 square meters)  •  Stage 4 Severe CKD (GFR = 15-29 mL/min/1 73 square meters)  •  Stage 5 End Stage CKD (GFR <15 mL/min/1 73 square meters)  Note: GFR calculation is accurate only with a steady state creatinine    Lipase [[id number]]  (Normal) Collected: 10/17/22 2106    Lab Status: Final result Specimen: Blood from Arm, Right Updated: 10/17/22 2157     Lipase 295 u/L     Manual Differential(PHLEBS Do Not Order) [[id number]]  (Abnormal) Collected: 10/17/22 2106    Lab Status: Final result Specimen: Blood from Arm, Right Updated: 10/17/22 2142     Segmented % 88 %      Bands % 1 %      Lymphocytes % 4 %      Monocytes % 7 %      Eosinophils, % 0 %      Basophils % 0 %      Absolute Neutrophils 11 19 Thousand/uL      Lymphocytes Absolute 0 50 Thousand/uL      Monocytes Absolute 0 88 Thousand/uL      Eosinophils Absolute 0 00 Thousand/uL      Basophils Absolute 0 00 Thousand/uL      Total Counted --     RBC Morphology Normal     Platelet Estimate Adequate    CBC and differential [[id number]]  (Abnormal) Collected: 10/17/22 2106    Lab Status: Final result Specimen: Blood from Arm, Right Updated: 10/17/22 2129     WBC 12 57 Thousand/uL      RBC 5 23 Million/uL      Hemoglobin 16 0 g/dL      Hematocrit 47 0 %      MCV 90 fL      MCH 30 6 pg      MCHC 34 0 g/dL      RDW 13 2 %      MPV 10 7 fL      Platelets 686 Thousands/uL     Narrative: This is an appended report  These results have been appended to a previously verified report  No orders to display         Procedures  Procedures      ED Course  ED Course as of 10/18/22 0118   Mon Oct 17, 2022   2152 Patient states she wants to leave  Told patient still waiting for labs  States she feels better and does not want to leave   Advised patient to stay for labs but patient wants to leave  Will discharge  MDM  Number of Diagnoses or Management Options  Abdominal pain  Diagnosis management comments: 71-year-old female patient with history of gastroparesis, pancreatitis, CKD, presenting with epigastric abdominal pain and nausea vomiting  Patient states that she is not able to keep anything down  States this feels similar to her gastroparesis  Patient was seen earlier today and was given Reglan and Haldol with improved minutes symptoms  Patient states symptoms worsened when she went home  On exam, patient has epigastric abdominal tenderness  Patient was treated with Haldol and Reglan with improvement of symptoms  Patient states that she feels at baseline  Patient states that she wanted to leave pending CMP and lipase  Discussed with patient the risk of leaving without labs and suggested patient to stay however patient states that she wants to leave  Patient stable for discharge with return precautions given  Follow up with PCP  Amount and/or Complexity of Data Reviewed  Clinical lab tests: ordered and reviewed  Tests in the radiology section of CPT®: ordered and reviewed        Disposition  Final diagnoses:   Abdominal pain   Nausea and vomiting   Gastroparesis     Time reflects when diagnosis was documented in both MDM as applicable and the Disposition within this note     Time User Action Codes Description Comment    10/17/2022  9:54 PM Keily Patches Add [R10 9] Abdominal pain     10/18/2022  1:18 AM Verner Rosebush Seok Add [R11 2] Nausea and vomiting     10/18/2022  1:18 AM Keily Patches Add [A55 90] Gastroparesis       ED Disposition     ED Disposition   Discharge    Condition   Stable    Date/Time   Mon Oct 17, 2022  9:54 PM    Comment   Salvador Alanis discharge to home/self care                 Follow-up Information     Follow up With Specialties Details Why Contact Info    [facility] Medicine Schedule an appointment as soon as possible for a visit   [address]  [address]  [phone]            Discharge Medication List as of 10/17/2022  9:54 PM      CONTINUE these medications which have NOT CHANGED    Details   acetaminophen (TYLENOL) 500 mg tablet Take 2 tablets (1,000 mg total) by mouth every 8 (eight) hours as needed for mild pain, Starting Mon 2/28/2022, Normal      al mag oxide-diphenhydramine-lidocaine viscous (MAGIC MOUTHWASH) 1:1:1 suspension Swish and spit 10 mL every 4 (four) hours as needed for mouth pain or discomfort, Starting Fri 9/2/2022, Normal      albuterol (2 5 mg/3 mL) 0 083 % nebulizer solution USE 1 VIAL VIA NEBULIZER EVERY 6 HOURS AS NEEDED FOR WHEEZING OR SHORTNESS OF BREATH, Normal      albuterol (PROVENTIL HFA,VENTOLIN HFA) 90 mcg/act inhaler INHALE 2 PUFFS BY MOUTH EVERY 6 HOURS AS NEEDED FOR WHEEZING OR SHORTNESS OF BREATH, Normal      Alcohol Swabs (Alcohol Pads) 70 % PADS Use 4 (four) times a day, Starting Wed 12/1/2021, Normal      amphetamine-dextroamphetamine (ADDERALL XR) 20 MG 24 hr capsule Take 1 capsule (20 mg total) by mouth 2 (two) times a day Max Daily Amount: 40 mg, Starting Wed 10/12/2022, Normal      Blood Glucose Monitoring Suppl (FreeStyle Lite) DEIRDRE Use daily, Starting Wed 9/21/2022, Normal      Blood Pressure Monitor KIT Use daily to check blood pressure , Normal      buPROPion (WELLBUTRIN XL) 300 mg 24 hr tablet TAKE ONE TABLET BY MOUTH ONCE DAILY IN THE MORNING, Normal      calcium carbonate (OS-ALISIA) 600 MG tablet Take 1 tablet (600 mg total) by mouth daily, Starting Fri 9/2/2022, Normal      Cholecalciferol (Vitamin D) 50 MCG (2000 UT) tablet Take 1 tablet (2,000 Units total) by mouth daily, Starting Fri 9/2/2022, Normal      clobetasol (TEMOVATE) 0 05 % cream APPLY TOPICALLY TO AFFECTED AREA TWICE A DAY, Normal      Diclofenac Sodium (VOLTAREN) 1 % Apply 2 g topically 4 (four) times a day, Starting Wed 10/5/2022, Normal      dicyclomine (BENTYL) 20 mg tablet Take 1 tablet (20 mg total) by mouth in the morning and 1 tablet (20 mg total) in the evening  Do all this for 7 days  , Starting Thu 5/12/2022, Until Thu 8/4/2022, Normal      Easy Comfort Lancets MISC USE TO TEST THE BLOOD SUGAR THREE TIMES A DAY, Normal      glucose blood (FREESTYLE LITE) test strip USE TO TEST THE BLOOD SUGAR THREE TIMES A DAY AS DIRECTED, Normal      insulin glargine (Toujeo Max SoloStar) 300 units/mL CONCENTRATED U-300 injection pen (2-unit dial) Inject 22 Units under the skin daily, Starting Wed 8/10/2022, Normal      Insulin Pen Needle (Pen Needles) 31G X 8 MM MISC Inject under the skin 4 (four) times a day (with meals and at bedtime), Starting Tue 3/1/2022, Normal      lidocaine (LMX) 4 % cream Apply topically as needed for mild pain, Starting Tue 6/28/2022, Normal      lidocaine (XYLOCAINE) 5 % ointment APPLY TOPICALLY 2GM TWICE A DAY TO AFFECTED AREAS AS NEEDED FOR MILD PAIN, Normal      Linzess 72 MCG CAPS TAKE ONE CAPSULE BY MOUTH ONCE DAILY, Normal      naloxone (Narcan) 4 mg/0 1 mL nasal spray ADMINISTER 1 SPRAY IN ONE NOSTRIL IF NO REPONSE AFTER 2-3 MINUTES SPRAY INTO OTHER NOSTRIL WITH NEW spray, Normal      NON FORMULARY [facility]      NovoLOG FlexPen 100 units/mL injection pen INJECT 6 UNITS UNDER THE SKIN THREE TIMES A DAY WITH MEALS, Normal      ondansetron (Zofran ODT) 4 mg disintegrating tablet Take 1 tablet (4 mg total) by mouth every 6 (six) hours as needed for nausea or vomiting, Starting Mon 10/17/2022, Normal      ondansetron (ZOFRAN) 8 mg tablet Take 1 tablet (8 mg total) by mouth every 8 (eight) hours as needed for nausea or vomiting, Starting Tue 3/29/2022, Normal      oxyCODONE (Roxicodone) 15 mg immediate release tablet Take 1 tablet (15 mg total) by mouth every 4 (four) hours as needed for moderate pain Max Daily Amount: 90 mg, Starting Tue 10/4/2022, Normal      pantoprazole (PROTONIX) 40 mg tablet Take 1 tablet (40 mg total) by mouth 2 (two) times a day before meals, Starting Tue 1/18/2022, Normal      pregabalin (LYRICA) 100 mg capsule Take 1 capsule (100 mg total) by mouth 3 (three) times a day, Starting u 7/21/2022, Normal      promethazine (PHENERGAN) 25 mg tablet Take 1 tablet (25 mg total) by mouth every 6 (six) hours as needed for nausea or vomiting, Starting Mon 5/10/2021, Normal      Restasis 0 05 % ophthalmic emulsion Starting Wed 12/29/2021, Historical Med      scopolamine (TRANSDERM-SCOP) 1 5 mg/3 days TD 72 hr patch Place 1 patch on the skin every third day, Starting Tue 4/27/2021, Normal      sucralfate (CARAFATE) 1 g/10 mL suspension TAKE 10ML BY MOUTH THREE TIMES A DAY, Normal      Symbicort 80-4 5 MCG/ACT inhaler INHALE 2 PUFFS BY MOUTH TWICE A DAY RINSE MOUTH AFTER USE, Normal      TiZANidine (ZANAFLEX) 4 MG capsule Take 1 capsule (4 mg total) by mouth 3 (three) times a day, Starting Wed 10/5/2022, Normal           No discharge procedures on file  PDMP Review       Value Time User    PDMP Reviewed  Yes 10/12/2022  3:44 PM Dylan Mcclain PA-C           ED Provider  Attending physically available and evaluated Turtlepointmehran Sánchez  GLORIA managed the patient along with the ED Attending      Electronically Signed by         Brad Villarreal MD  10/18/22 2796

## 2022-10-18 NOTE — DISCHARGE INSTRUCTIONS
You were seen in the ED for abdominal pain  Return to the ED for any worsening symptoms or new symptoms  Follow up with your primary care doctor as soon as possible

## 2022-10-18 NOTE — ED NOTES
Pt screaming profanities heard throughout the ER  States she will stop screaming when her pain is controlled  Attempted to tell the patient that there are other people here including children so she would need to stop  Continued to talk over me the entire conversation  No evidence of understand        Rebecca Gay RN  10/17/22 1642

## 2022-10-18 NOTE — ED ATTENDING ATTESTATION
10/17/2022  I, Makenna Jj MD, saw and evaluated the patient  I have discussed the patient with the resident/non-physician practitioner and agree with the resident's/non-physician practitioner's findings, Plan of Care, and MDM as documented in the resident's/non-physician practitioner's note, except where noted  All available labs and Radiology studies were reviewed  I was present for key portions of any procedure(s) performed by the resident/non-physician practitioner and I was immediately available to provide assistance  At this point I agree with the current assessment done in the Emergency Department  I have conducted an independent evaluation of this patient a history and physical is as follows:  Patient is a 45 yo female, hx of DM, Gastroparesis, marijuana use, comes with c/o intractable epigastric abdominal pain, was seen in ER earlier today for same, had labs, CT which were non-acute, was discharged home, didn't feel better so came back to ER; no fever, chest pain, dyspnea, vomiting, diarrhea  On exam, patient is conscious, vital signs noted for elevated blood pressure, patient appears uncomfortable due to pain, abdomen is soft, tenderness in epigastrium noted; no increased work of breathing, O2 sats 99% on room air, pulses are equal bilaterally, no peripheral edema, no calf tenderness or swelling  Impression:  Intractable abdominal pain, possible gastroparesis versus marijuana related, we will check labs, give IV fluids, pain meds Haldol, Reglan  ED Course     2154:  Patient feeling better, wants leave, patient was informed that her labs are not all back, however patient states that she is feeling better and would like to be discharged      Critical Care Time  Procedures

## 2022-10-26 ENCOUNTER — OFFICE VISIT (OUTPATIENT)
Dept: FAMILY MEDICINE CLINIC | Facility: CLINIC | Age: 52
End: 2022-10-26

## 2022-10-26 VITALS
OXYGEN SATURATION: 96 % | WEIGHT: 142 LBS | HEART RATE: 79 BPM | DIASTOLIC BLOOD PRESSURE: 60 MMHG | RESPIRATION RATE: 19 BRPM | BODY MASS INDEX: 25.97 KG/M2 | TEMPERATURE: 97.9 F | SYSTOLIC BLOOD PRESSURE: 96 MMHG

## 2022-10-26 DIAGNOSIS — R10.84 GENERALIZED ABDOMINAL PAIN: ICD-10-CM

## 2022-10-26 DIAGNOSIS — R21 RASH: ICD-10-CM

## 2022-10-26 DIAGNOSIS — R11.0 NAUSEA: Primary | ICD-10-CM

## 2022-10-26 DIAGNOSIS — E11.22 TYPE 2 DIABETES MELLITUS WITH STAGE 3 CHRONIC KIDNEY DISEASE, WITH LONG-TERM CURRENT USE OF INSULIN, UNSPECIFIED WHETHER STAGE 3A OR 3B CKD (HCC): Chronic | ICD-10-CM

## 2022-10-26 DIAGNOSIS — E11.9 TYPE 2 DIABETES MELLITUS WITHOUT COMPLICATION, WITH LONG-TERM CURRENT USE OF INSULIN (HCC): ICD-10-CM

## 2022-10-26 DIAGNOSIS — Z79.4 TYPE 2 DIABETES MELLITUS WITH STAGE 3 CHRONIC KIDNEY DISEASE, WITH LONG-TERM CURRENT USE OF INSULIN, UNSPECIFIED WHETHER STAGE 3A OR 3B CKD (HCC): Chronic | ICD-10-CM

## 2022-10-26 DIAGNOSIS — Z79.4 TYPE 2 DIABETES MELLITUS WITHOUT COMPLICATION, WITH LONG-TERM CURRENT USE OF INSULIN (HCC): ICD-10-CM

## 2022-10-26 DIAGNOSIS — G62.9 NEUROPATHY: ICD-10-CM

## 2022-10-26 DIAGNOSIS — R29.898 WEAKNESS OF BOTH LOWER EXTREMITIES: ICD-10-CM

## 2022-10-26 DIAGNOSIS — N18.30 TYPE 2 DIABETES MELLITUS WITH STAGE 3 CHRONIC KIDNEY DISEASE, WITH LONG-TERM CURRENT USE OF INSULIN, UNSPECIFIED WHETHER STAGE 3A OR 3B CKD (HCC): Chronic | ICD-10-CM

## 2022-10-26 RX ORDER — NYSTATIN AND TRIAMCINOLONE ACETONIDE 100000; 1 [USP'U]/G; MG/G
OINTMENT TOPICAL 2 TIMES DAILY
Qty: 30 G | Refills: 1 | Status: SHIPPED | OUTPATIENT
Start: 2022-10-26

## 2022-10-26 RX ORDER — INSULIN GLARGINE 300 U/ML
22 INJECTION, SOLUTION SUBCUTANEOUS DAILY
Qty: 9 ML | Refills: 1 | Status: SHIPPED | OUTPATIENT
Start: 2022-10-26

## 2022-10-26 RX ORDER — ONDANSETRON 8 MG/1
8 TABLET, ORALLY DISINTEGRATING ORAL EVERY 8 HOURS PRN
Qty: 30 TABLET | Refills: 2 | Status: SHIPPED | OUTPATIENT
Start: 2022-10-26

## 2022-10-26 NOTE — PROGRESS NOTES
Assessment/Plan:    Type 2 diabetes mellitus with stage 3 chronic kidney disease, with long-term current use of insulin ([facility] [address] )   - Patient has history of type 2 diabetes with long-term use of insulin with associated neuropathy and chronic kidney disease stage 3-4   - Patient reports not having Toujeo for the past few months  Will provide refill    - Continue Toujeo 22 units daily and NovoLog 6 units, 3 times daily with meals   - Continue checking daily blood sugars  Lab Results   Component Value Date    HGBA1C 5 7 02/28/2022    HGBA1C 5 7 09/29/2021    HGBA1C 6 0 02/15/2021       Neuropathy  - Patient had previously tried gabapentin, but it caused restless legs syndrome  Patient recently tried Cymbalta, but it caused side effect of dizziness, abdominal pain, vomiting   - Patient is now prescribed Lyrica 100 mg, three times daily, although patient has to be cautious when taking due to side effect of low blood pressure  - Patient is requesting updated EMG studies of bilateral upper and lower extremities  Orders placed today  - Patient is due for neurology follow-up, last seen in April 2021  Updated referral placed today  Generalized abdominal pain/nausea  - Over the past few months, patient has had to go to emergency department secondary to intense abdominal pain with associated nausea, vomiting  Each time, patient was prescribed Haldol which did help to relieve symptoms  - Will prescribe small supply of Haldol 5 mg, to be taken as needed for intense abdominal pain/nausea  Hopefully, this will help prevent more frequent visits to the ED  Diagnoses and all orders for this visit:    Nausea  -     ondansetron (Zofran ODT) 8 mg disintegrating tablet; Take 1 tablet (8 mg total) by mouth every 8 (eight) hours as needed for nausea or vomiting  -     haloperidol (HALDOL) 5 mg tablet;  Take 1 tablet (5 mg total) by mouth daily as needed (abdominal pain/nausea)    Type 2 diabetes mellitus without complication, with long-term current use of insulin (HCC)  -     insulin glargine (Toujeo Max SoloStar) 300 units/mL CONCENTRATED U-300 injection pen (2-unit dial); Inject 22 Units under the skin daily    Rash  -     hydrocortisone 2 5 % cream; Apply topically 2 (two) times a day as needed for rash  -     nystatin-triamcinolone (MYCOLOG-II) ointment; Apply topically 2 (two) times a day    Generalized abdominal pain  -     haloperidol (HALDOL) 5 mg tablet; Take 1 tablet (5 mg total) by mouth daily as needed (abdominal pain/nausea)    Neuropathy  -     EMG 2 limb lower extremity; Future  -     EMG 2 Limb Upper Extremity; Future  -     Ambulatory Referral to Neurology; Future    Type 2 diabetes mellitus with stage 3 chronic kidney disease, with long-term current use of insulin, unspecified whether stage 3a or 3b CKD ([facility] [address] )    Weakness of both lower extremities  -     Ambulatory Referral to Neurology; Future          All of patients questions were answered  Patient understands and agrees with the above plan  Return in about 3 months (around 1/26/2023) for Next scheduled follow up neuropathy   Santos Thrasher  10/26/22  [address] [facility] [place]          Subjective:     Patient ID: Philomena Whyte  is a 46 y o  female with known PMH of gastroparesis, GERD, constipation, ADHD, type 2 diabetes mellitus, cataplexy, Wegener's granulomatosis, small fiber neuropathy, bipolar 1 disorder  who presents today in office for abdominal pain follow-up  - Patient is a 46 y o  female who presents today for abdominal pain follow-up  Patient had presented twice to [facility] ED within the past month secondary to abdominal pain, nausea, vomiting  CT abdomen pelvis with contrast revealed colonic diverticulosis  Stable left adrenal adenoma  Simple left paraovarian cyst unchanged from multiple prior studies  Lab work was unremarkable  Patient received symptomatic treatment with IV fluids, Zofran, Reglan, Haldol    Today, patient notes her abdominal pain has much improved  Patient notes whenever she has the intense abdominal pain and is unable to keep anything down, but is when she goes to the emergency department  Patient notes this is about the fourth time that she has received Haldol which did help to relieve her symptoms  Patient is requesting a prescription for Haldol to have at home, to be taken as needed when these symptoms occur so that she could avoid going to the emergency department every time  - Patient notes she did take Lyrica last night, which she is assuming why her blood pressure is low today  Patient notes she is able to sleep very well when taking the medication, however does cause low blood pressure  Therefore, patient notes she is only able to take the medication once a day, however patient notes she has not been taking it every day due to the low blood pressure effects     - Patient notes she requires a home health care aide to be present with her throughout the night because she requires help with getting up and out of bed to use the bathroom  Patient notes due to her chronic conditions, she is unable to stand on her own to get out of the bed and is at increased risk for falling without the assistance of another person  Patient notes she does wake up at least 5 times a night to use the bathroom  In addition, patient notes her current power wheelchair is broken, so she is currently using a manual wheelchair  This makes it very difficult for patient to ambulate on her own  - Patient notes she does wear diapers and is currently experiencing some irritation  She is requesting a cream to help with this  Patient notes recently she has been experiencing diffuse rash which has been moderately itchy  Patient notes it is located on her legs, abdomen, back, chest   Patient notes she then originally noticed it after using the hot tub        The following portions of the patient's history were reviewed and updated as appropriate: allergies, current medications, past family history, past medical history, past social history, past surgical history and problem list         Review of Systems   Constitutional: Negative for appetite change, fatigue and fever  HENT: Negative for congestion and sore throat  Eyes: Negative for pain  Respiratory: Negative for cough, chest tightness and shortness of breath  Cardiovascular: Negative for chest pain and palpitations  Gastrointestinal: Negative for abdominal pain, constipation, diarrhea, nausea and vomiting  Genitourinary: Negative for difficulty urinating and dysuria  Musculoskeletal: Positive for arthralgias, back pain, myalgias and neck pain  Skin: Positive for rash  Neurological: Positive for weakness and numbness  Negative for dizziness and headaches  Psychiatric/Behavioral: The patient is not nervous/anxious  Objective:   Vitals:    10/26/22 1249   BP: 96/60   BP Location: Left arm   Patient Position: Sitting   Cuff Size: Adult   Pulse: 79   Resp: 19   Temp: 97 9 °F (36 6 °C)   TempSrc: Temporal   SpO2: 96%   Weight: 64 4 kg (142 lb)         Physical Exam  Vitals and nursing note reviewed  Constitutional:       General: She is not in acute distress  Appearance: She is well-developed  Comments: Examined in wheelchair   HENT:      Head: Normocephalic and atraumatic  Right Ear: External ear normal       Left Ear: External ear normal       Nose: Nose normal    Eyes:      Conjunctiva/sclera: Conjunctivae normal    Cardiovascular:      Rate and Rhythm: Normal rate and regular rhythm  Pulses: Normal pulses  Heart sounds: Normal heart sounds  Pulmonary:      Effort: Pulmonary effort is normal  No respiratory distress  Breath sounds: Normal breath sounds  No wheezing  Abdominal:      General: Bowel sounds are normal       Palpations: Abdomen is soft  Tenderness: There is no abdominal tenderness     Musculoskeletal: Cervical back: Normal range of motion and neck supple  Skin:     General: Skin is warm and dry  Neurological:      Mental Status: She is alert and oriented to person, place, and time     Psychiatric:         Behavior: Behavior normal

## 2022-10-26 NOTE — LETTER
October 26, 2022     Patient: Sosa Fuller  YOB: 1970  Date of Visit: 10/26/2022      To Whom it May Concern:    Sosa Fuller is under my professional care  Patient has active medical diagnoses of gastroparesis, GERD, constipation, ADHD, type 2 diabetes mellitus, cataplexy, Wegener's granulomatosis, small fiber neuropathy, bipolar 1 disorder  Patient is wheel chair bound  Due to these medical conditions, patient requires to have 24 hours a day/7 days a week home health services  Patient cannot perform any activities of daily living on her own including dressing, bathing, cooking, using the bathroom  Patient requires assistance to transfer in and out of her bed and chairs  Patient's current electric wheelchair is broken which further decreases patient's mobility  Patient requires assistance with mobility  Patient uses the bathroom several times a night and requires assistance to get out of her bed to get to the bathroom safely  Therefore, patient requires to have 24 hours a day/7 days a week home health services  If you have any questions or concerns, please don't hesitate to call           Sincerely,          Katarzyna Thrasher PA-C        CC: No Recipients

## 2022-10-28 DIAGNOSIS — N18.30 TYPE 2 DIABETES MELLITUS WITH STAGE 3 CHRONIC KIDNEY DISEASE, WITH LONG-TERM CURRENT USE OF INSULIN, UNSPECIFIED WHETHER STAGE 3A OR 3B CKD (HCC): ICD-10-CM

## 2022-10-28 DIAGNOSIS — Z79.4 TYPE 2 DIABETES MELLITUS WITH STAGE 3 CHRONIC KIDNEY DISEASE, WITH LONG-TERM CURRENT USE OF INSULIN, UNSPECIFIED WHETHER STAGE 3A OR 3B CKD (HCC): ICD-10-CM

## 2022-10-28 DIAGNOSIS — E11.22 TYPE 2 DIABETES MELLITUS WITH STAGE 3 CHRONIC KIDNEY DISEASE, WITH LONG-TERM CURRENT USE OF INSULIN, UNSPECIFIED WHETHER STAGE 3A OR 3B CKD (HCC): ICD-10-CM

## 2022-10-28 RX ORDER — PEN NEEDLE, DIABETIC 33 GX5/32"
NEEDLE, DISPOSABLE MISCELLANEOUS
Qty: 100 EACH | Refills: 10 | Status: SHIPPED | OUTPATIENT
Start: 2022-10-28

## 2022-10-30 RX ORDER — HALOPERIDOL 5 MG/1
5 TABLET ORAL DAILY PRN
Qty: 10 TABLET | Refills: 0 | Status: SHIPPED | OUTPATIENT
Start: 2022-10-30 | End: 2022-11-10

## 2022-10-31 ENCOUNTER — EVALUATION (OUTPATIENT)
Dept: PHYSICAL THERAPY | Facility: REHABILITATION | Age: 52
End: 2022-10-31

## 2022-10-31 DIAGNOSIS — R10.2 PELVIC PAIN: ICD-10-CM

## 2022-10-31 DIAGNOSIS — G89.4 CHRONIC PAIN SYNDROME: ICD-10-CM

## 2022-10-31 DIAGNOSIS — N39.46 URINARY INCONTINENCE, MIXED: Primary | ICD-10-CM

## 2022-10-31 DIAGNOSIS — M31.31 GRANULOMATOSIS WITH POLYANGIITIS WITH RENAL INVOLVEMENT (HCC): ICD-10-CM
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
## 2022-11-01 RX ORDER — OXYCODONE HYDROCHLORIDE 15 MG/1
15 TABLET ORAL EVERY 4 HOURS PRN
Qty: 180 TABLET | Refills: 0 | Status: SHIPPED | OUTPATIENT
Start: 2022-11-01

## 2022-11-01 NOTE — ASSESSMENT & PLAN NOTE
- Patient has history of type 2 diabetes with long-term use of insulin with associated neuropathy and chronic kidney disease stage 3-4   - Patient reports not having Toujeo for the past few months  Will provide refill    - Continue Toujeo 22 units daily and NovoLog 6 units, 3 times daily with meals   - Continue checking daily blood sugars    Lab Results   Component Value Date    HGBA1C 5 7 02/28/2022    HGBA1C 5 7 09/29/2021    HGBA1C 6 0 02/15/2021

## 2022-11-01 NOTE — ASSESSMENT & PLAN NOTE
- Patient had previously tried gabapentin, but it caused restless legs syndrome  Patient recently tried Cymbalta, but it caused side effect of dizziness, abdominal pain, vomiting   - Patient is now prescribed Lyrica 100 mg, three times daily, although patient has to be cautious when taking due to side effect of low blood pressure  - Patient is requesting updated EMG studies of bilateral upper and lower extremities  Orders placed today  - Patient is due for neurology follow-up, last seen in April 2021  Updated referral placed today

## 2022-11-02 ENCOUNTER — TELEPHONE (OUTPATIENT)
Dept: PAIN MEDICINE | Facility: CLINIC | Age: 52
End: 2022-11-02

## 2022-11-02 ENCOUNTER — OFFICE VISIT (OUTPATIENT)
Dept: PAIN MEDICINE | Facility: CLINIC | Age: 52
End: 2022-11-02

## 2022-11-02 VITALS
HEART RATE: 56 BPM | BODY MASS INDEX: 26.13 KG/M2 | SYSTOLIC BLOOD PRESSURE: 104 MMHG | WEIGHT: 142 LBS | HEIGHT: 62 IN | DIASTOLIC BLOOD PRESSURE: 73 MMHG

## 2022-11-02 DIAGNOSIS — M54.50 CHRONIC BILATERAL LOW BACK PAIN WITHOUT SCIATICA: ICD-10-CM

## 2022-11-02 DIAGNOSIS — M79.18 MYOFASCIAL PAIN SYNDROME: ICD-10-CM

## 2022-11-02 DIAGNOSIS — M54.2 NECK PAIN: ICD-10-CM

## 2022-11-02 DIAGNOSIS — G89.29 CHRONIC BILATERAL LOW BACK PAIN WITHOUT SCIATICA: ICD-10-CM

## 2022-11-02 DIAGNOSIS — M54.12 CERVICAL RADICULOPATHY: Primary | ICD-10-CM

## 2022-11-02 RX ORDER — PREGABALIN 25 MG/1
25 CAPSULE ORAL 2 TIMES DAILY
Qty: 60 CAPSULE | Refills: 2 | Status: SHIPPED | OUTPATIENT
Start: 2022-11-02

## 2022-11-02 NOTE — PATIENT INSTRUCTIONS
Epidural Steroid Injection   WHAT YOU NEED TO KNOW:   What do I need to know about an epidural steroid injection (DUC)? An DUC is a procedure to inject steroid medicine into the epidural space  The epidural space is between your spinal cord and vertebrae  Steroids reduce inflammation and fluid buildup in your spine that may be causing pain  You may be given pain medicine along with the steroids  How do I prepare for an DUC? Your healthcare provider will talk to you about how to prepare for your procedure  He or she will tell you what medicines to take or not take on the day of your procedure  You may need to stop taking blood thinners or other medicines several days before your procedure  You may need to adjust any diabetes medicine you take on the day of your procedure  Steroid medicine can increase your blood sugar level  Arrange for someone to drive you home when you are discharged  What will happen during an DUC? You will be given medicine to numb the procedure area  You will be awake for the procedure, but you will not feel pain  You may also be given medicine to help you relax  Contrast liquid will be used to help your healthcare provider see the area better  Tell the healthcare provider if you have ever had an allergic reaction to contrast liquid  Your healthcare provider may place the needle into your neck area, middle of your back, or tailbone area  He may inject the medicine next to the nerves that are causing your pain  He may instead inject the medicine into a larger area of the epidural space  This helps the medicine spread to more nerves  Your healthcare provider will use a fluoroscope to help guide the needle to the right place  A fluoroscope is a type of x-ray  After the procedure, a bandage will be placed over the injection site to prevent infection  What will happen after an DUC? You will have a bandage over the injection site to prevent infection   Your healthcare provider will tell you when you can bathe and any activity guidelines  You will be able to go home  What are the risks of an DUC? You may have temporary or permanent nerve damage or paralysis  You may have bleeding or develop a serious infection, such as meningitis (swelling of the brain coverings)  An abscess may also develop  An abscess is a pus-filled area under the skin  You may need surgery to fix the abscess  You may have a seizure, anxiety, or trouble sleeping  If you are a man, you may have temporary erectile dysfunction (not able to have an erection)  CARE AGREEMENT:   You have the right to help plan your care  Learn about your health condition and how it may be treated  Discuss treatment options with your healthcare providers to decide what care you want to receive  You always have the right to refuse treatment  The above information is an  only  It is not intended as medical advice for individual conditions or treatments  Talk to your doctor, nurse or pharmacist before following any medical regimen to see if it is safe and effective for you  © Copyright [employer] [facility] 2022 Information is for End User's use only and may not be sold, redistributed or otherwise used for commercial purposes   All illustrations and images included in CareNotes® are the copyrighted property of [employer]  or [address] [place] [address] [employer]

## 2022-11-02 NOTE — TELEPHONE ENCOUNTER
Patient has been added to the [id number] calendar for her upcoming procedure on 12/13/22

## 2022-11-02 NOTE — PROGRESS NOTES
Pain Medicine Follow-Up Note    Assessment:  1  Cervical radiculopathy    2  Neck pain    3  Myofascial pain syndrome    4  Chronic bilateral low back pain without sciatica        Plan:  While the patient was in the office today, I did have a thorough conversation regarding their chronic pain syndrome, medication management, and treatment plan options  After discussing options, I have recommended the patient schedule a cervical epidural steroid injection at C7-T1 to address the increased neck and upper extremity radicular pain pattern  This injection has been significantly beneficial in the past, providing greater than 50% reduction in pain for more than 3 months  Currently she is taking Lyrica 100 mg at bedtime which helps with the nighttime neuropathic pain  She states that taking the 100 mg dose during the daytime as causing elevated blood pressure but she is inquiring about a lower dose  I have prescribed 25 mg to take morning and afternoon and she will continue the 100 mg at bedtime  After discussing options  Orders Placed This Encounter   Procedures   • FL spine and pain procedure     Standing Status:   Future     Standing Expiration Date:   11/2/2026     Order Specific Question:   Reason for Exam:     Answer:   YANNI C7-T1     Order Specific Question:   Is the patient pregnant? Answer:   No     Order Specific Question:   Anticoagulant hold needed? Answer:   no       New Medications Ordered This Visit   Medications   • pregabalin (LYRICA) 25 mg capsule     Sig: Take 1 capsule (25 mg total) by mouth 2 (two) times a day     Dispense:  60 capsule     Refill:  2       My impressions and treatment recommendations were discussed in detail with the patient who verbalized understanding and had no further questions  Complete risks and benefits including bleeding, infection, tissue reaction, nerve injury and allergic reaction were discussed   The approach was demonstrated using models and literature was provided  Verbal and written consent was obtained  Follow-up after the procedure  Discharge instructions were provided  I personally saw and examined the patient and I agree with the above discussed plan of care  History of Present Illness:    Grayson Shipman is a 46 y o  female who presents to [facility] for interval re-evaluation of the above stated pain complaints  The patient has a past medical and chronic pain history as outlined in the assessment section  She was last seen on 07/21/2022  The patient presents today with complaints of increasing neck pain that she rates a 6/10 on the pain scale  She describes the pain as a constant burning, dull, aching, sharp, throbbing, pressure-like, shooting pain with intermittent numbness and paresthesias in bilateral upper extremities  She previously underwent cervical epidural steroid injection C7-T1 on May 5, 2022 and reported greater than 70% reduction of pain in that relief has lasted up until recently  She was prescribed Lyrica 100 mg t i d  but reduced to HS only due to the conclusion that it was raising her blood pressure  She continues with severe pain during the day however  Other than as stated above, the patient denies any interval changes in medications, medical condition, mental condition, symptoms, or allergies since the last office visit  Review of Systems:    Review of Systems   Eyes:        Dry eye    Gastrointestinal: Positive for constipation  Musculoskeletal: Positive for back pain, joint swelling, neck pain and neck stiffness  Neurological: Positive for dizziness  Psychiatric/Behavioral: Positive for confusion           Patient Active Problem List   Diagnosis   • Type 2 diabetes mellitus with stage 3 chronic kidney disease, with long-term current use of insulin ([facility])   • Dyslipidemia   • Granulomatosis with polyangiitis ([facility] [address] )   • MPA (microscopic polyangiitis) ([facility])   • Asthma   • Benign essential HTN   • Chronic right shoulder pain   • Noncompliance   • Bipolar 1 disorder ([facility])   • Epigastric pain   • Pancreatitis   • Ovarian cyst   • Postherpetic neuralgia   • Anemia of chronic disease   • Arthralgia of multiple joints   • Cataplexy   • Weakness of both lower extremities   • Chronic pelvic pain in female   • Plantar wart, right foot   • Seasonal allergic rhinitis due to pollen   • Gastroesophageal reflux disease   • Weakness of both upper extremities   • Persistent proteinuria   • Left leg pain   • Controlled substance agreement signed   • Chronic narcotic use   • Small fiber neuropathy   • IBS (irritable bowel syndrome)   • Leukocytosis   • Hyperosmia   • Smell disturbance   • Contusion of left hip   • Trochanteric bursitis of left hip   • Neuropathy   • Attention deficit hyperactivity disorder (ADHD)   • Elevated blood pressure reading   • Costochondritis   • Hearing difficulty of both ears   • Vaginal atrophy   • Alpha-hemolytic Streptococcus positive urine culture   • Schizo-affective schizophrenia ([facility])   • 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[facility])   • Cervical radiculopathy   • Finger numbness   • Fall at home, initial encounter   • Sinus bradycardia   • Marijuana use   • Difficulty ventilating with mask   • Chronic bilateral low back pain without sciatica   • Lump of skin of back   • Cervical disc disorder with radiculopathy of mid-cervical region   • Neck pain   • Cervical spinal stenosis   • Depression   • Continuous opioid dependence ([facility])   • Chronic kidney disease, stage 4 (severe) ([facility])   • Chronic pain   • COVID   • PTSD (post-traumatic stress disorder)   • Pain of finger of right hand   • Sacroiliitis ([facility])   • Myofascial pain syndrome   • Lumbar spondylosis   • Chronic right hip pain   • PONV (postoperative nausea and vomiting)   • Osteopenia   • Pain in finger of left hand       Past Medical History:   Diagnosis Date   • ADHD    • Anemia of chronic disease    • Anxiety    • Arthritis ?    • Asthma    • Bipolar disorder ([place] [address] )    • Borderline personality disorder ([place] [address] )    • Cataplexy    • Chronic abdominal pain    • Chronic kidney disease ? • CKD (chronic kidney disease) stage 3, GFR 30-59 ml/min (HCC)    • Cushing syndrome (HCC)    • Depression ? • Diabetes mellitus ([place] [address] )    • DVT (deep venous thrombosis) ([facility])    • GERD (gastroesophageal reflux disease)    • Headache(784 0) 3 months   • History of acute pancreatitis     felt secondary to Bactrim   • History of transfusion    • Hypertension    • Liver disease     fatty liver   • Microscopic polyangiitis (HCC)    • Ovarian cyst    • PTSD (post-traumatic stress disorder)    • Self-inflicted injury     self inflicted skin wounds   • Sleep apnea    • Wegener's granulomatosis with renal involvement ([place] [address] ) 2015       Past Surgical History:   Procedure Laterality Date   • ESOPHAGOGASTRODUODENOSCOPY  09/11/2015    mild antral gastritis   • GASTRIC STIMULATOR IMPLANT SURGERY  06/25/2020   • ME COLONOSCOPY FLX DX W/COLLJ SPEC WHEN PFRMD N/A 12/14/2018    adenoma removed from the transverse, hyperplastic polyp removed from the left colon   • ME ESOPHAGOGASTRODUODENOSCOPY TRANSORAL DIAGNOSTIC N/A 12/14/2018    gastritis and scant coffee-ground material   Biopsies negative for H  pylori   • ME OPEN TX RADIAL & ULNAR SHAFT FX FIX RADIUS AND ULNA Left 6/23/2022    Procedure: OPEN REDUCTION W/ INTERNAL FIXATION (ORIF) RADIUS / ULNA (WRIST); Surgeon: Scott Garcia MD;  Location: [facility];  Service: Orthopedics   • RELEASE SCAR CONTRACTURE / GRAFT REPAIRS OF HAND Bilateral    • UPPER GASTROINTESTINAL ENDOSCOPY  12/26/2019    Dr Mckenzie Pellet   Botox to the pylorus       Family History   Problem Relation Age of Onset   • Arthritis Mother    • Depression Mother    • Diabetes Mother    • Mental illness Mother    • Migraines Mother    • No Known Problems Father    • Colon cancer Neg Hx    • Drug abuse Neg Hx         mother father   • Mental illness Neg Hx         disorder, mother father   • Cancer Neg Hx    • Breast cancer Neg Hx        Social History     Occupational History   • Occupation: disability   Tobacco Use   • Smoking status: Former Smoker     Packs/day:  00     Years: 10 00     Pack years: 10 00     Types: Cigarettes     Quit date: 2011     Years since quittin 8   • Smokeless tobacco: Never Used   • Tobacco comment: Stopped smoking 11 years ago   Vaping Use   • Vaping Use: Never used   Substance and Sexual Activity   • Alcohol use: Never   • Drug use: Yes     Types: Marijuana     Comment: marijuana daily   • Sexual activity: Not Currently     Partners: Male     Birth control/protection: None         Current Outpatient Medications:   •  oxyCODONE (Roxicodone) 15 mg immediate release tablet, Take 1 tablet (15 mg total) by mouth every 4 (four) hours as needed for moderate pain Max Daily Amount: 90 mg, Disp: 180 tablet, Rfl: 0  •  pregabalin (LYRICA) 25 mg capsule, Take 1 capsule (25 mg total) by mouth 2 (two) times a day, Disp: 60 capsule, Rfl: 2  •  acetaminophen (TYLENOL) 500 mg tablet, Take 2 tablets (1,000 mg total) by mouth every 8 (eight) hours as needed for mild pain, Disp: 60 tablet, Rfl: 1  •  al mag oxide-diphenhydramine-lidocaine viscous (MAGIC MOUTHWASH) 1:1:1 suspension, Swish and spit 10 mL every 4 (four) hours as needed for mouth pain or discomfort, Disp: 90 mL, Rfl: 2  •  albuterol (2 5 mg/3 mL) 0 083 % nebulizer solution, USE 1 VIAL VIA NEBULIZER EVERY 6 HOURS AS NEEDED FOR WHEEZING OR SHORTNESS OF BREATH, Disp: 90 mL, Rfl: 0  •  albuterol (PROVENTIL HFA,VENTOLIN HFA) 90 mcg/act inhaler, INHALE 2 PUFFS BY MOUTH EVERY 6 HOURS AS NEEDED FOR WHEEZING OR SHORTNESS OF BREATH, Disp: 18 g, Rfl: 1  •  Alcohol Swabs (Alcohol Pads) 70 % PADS, Use 4 (four) times a day, Disp: 400 each, Rfl: 2  •  amphetamine-dextroamphetamine (ADDERALL XR) 20 MG 24 hr capsule, Take 1 capsule (20 mg total) by mouth 2 (two) times a day Max Daily Amount: 40 mg, Disp: 60 capsule, Rfl: 0  •  Blood Glucose Monitoring Suppl (FreeStyle Lite) DEIRDRE, Use daily, Disp: 1 each, Rfl: 0  •  Blood Pressure Monitor KIT, Use daily to check blood pressure , Disp: 1 kit, Rfl: 0  •  buPROPion (WELLBUTRIN XL) 300 mg 24 hr tablet, TAKE ONE TABLET BY MOUTH ONCE DAILY IN THE MORNING, Disp: 30 tablet, Rfl: 0  •  calcium carbonate (OS-ALISIA) 600 MG tablet, Take 1 tablet (600 mg total) by mouth daily, Disp: 90 tablet, Rfl: 1  •  Cholecalciferol (Vitamin D) 50 MCG (2000 UT) tablet, Take 1 tablet (2,000 Units total) by mouth daily, Disp: 90 tablet, Rfl: 1  •  clobetasol (TEMOVATE) 0 05 % cream, APPLY TOPICALLY TO AFFECTED AREA TWICE A DAY, Disp: 60 g, Rfl: 1  •  Comfort Touch Insulin Pen Need 33G X 6 MM MISC, USE TO INJECT INSULIN FOUR TIMES A DAY ( WITH MEALS AND AT BEDTIME ), Disp: 100 each, Rfl: 10  •  Diclofenac Sodium (VOLTAREN) 1 %, Apply 2 g topically 4 (four) times a day, Disp: 150 g, Rfl: 1  •  dicyclomine (BENTYL) 20 mg tablet, Take 1 tablet (20 mg total) by mouth in the morning and 1 tablet (20 mg total) in the evening  Do all this for 7 days  , Disp: 20 tablet, Rfl: 0  •  Easy Comfort Lancets MISC, USE TO TEST THE BLOOD SUGAR THREE TIMES A DAY, Disp: 100 each, Rfl: 6  •  glucose blood (FREESTYLE LITE) test strip, USE TO TEST THE BLOOD SUGAR THREE TIMES A DAY AS DIRECTED, Disp: 100 strip, Rfl: 10  •  haloperidol (HALDOL) 5 mg tablet, Take 1 tablet (5 mg total) by mouth daily as needed (abdominal pain/nausea), Disp: 10 tablet, Rfl: 0  •  hydrocortisone 2 5 % cream, Apply topically 2 (two) times a day as needed for rash, Disp: 30 g, Rfl: 1  •  insulin glargine (Toujeo Max SoloStar) 300 units/mL CONCENTRATED U-300 injection pen (2-unit dial), Inject 22 Units under the skin daily, Disp: 9 mL, Rfl: 1  •  lidocaine (LMX) 4 % cream, Apply topically as needed for mild pain, Disp: 30 g, Rfl: 0  •  lidocaine (XYLOCAINE) 5 % ointment, APPLY TOPICALLY 2GM TWICE A DAY TO AFFECTED AREAS AS NEEDED FOR MILD PAIN (Patient taking differently: Lidocaine patches), Disp: 250 g, Rfl: 8  •  Linzess 72 MCG CAPS, TAKE ONE CAPSULE BY MOUTH ONCE DAILY, Disp: 90 capsule, Rfl: 10  •  naloxone (Narcan) 4 mg/0 1 mL nasal spray, ADMINISTER 1 SPRAY IN ONE NOSTRIL IF NO REPONSE AFTER 2-3 MINUTES SPRAY INTO OTHER NOSTRIL WITH NEW spray, Disp: 2 each, Rfl: 2  •  NON FORMULARY, [facility], Disp: , Rfl:   •  NovoLOG FlexPen 100 units/mL injection pen, INJECT 6 UNITS UNDER THE SKIN THREE TIMES A DAY WITH MEALS, Disp: 15 mL, Rfl: 1  •  nystatin-triamcinolone (MYCOLOG-II) ointment, Apply topically 2 (two) times a day, Disp: 30 g, Rfl: 1  •  ondansetron (Zofran ODT) 8 mg disintegrating tablet, Take 1 tablet (8 mg total) by mouth every 8 (eight) hours as needed for nausea or vomiting, Disp: 30 tablet, Rfl: 2  •  pantoprazole (PROTONIX) 40 mg tablet, Take 1 tablet (40 mg total) by mouth 2 (two) times a day before meals, Disp: 180 tablet, Rfl: 1  •  pregabalin (LYRICA) 100 mg capsule, Take 1 capsule (100 mg total) by mouth 3 (three) times a day, Disp: 90 capsule, Rfl: 1  •  promethazine (PHENERGAN) 25 mg tablet, Take 1 tablet (25 mg total) by mouth every 6 (six) hours as needed for nausea or vomiting, Disp: 60 tablet, Rfl: 3  •  Restasis 0 05 % ophthalmic emulsion, , Disp: , Rfl:   •  scopolamine (TRANSDERM-SCOP) 1 5 mg/3 days TD 72 hr patch, Place 1 patch on the skin every third day, Disp: 10 patch, Rfl: 0  •  sucralfate (CARAFATE) 1 g/10 mL suspension, TAKE 10ML BY MOUTH THREE TIMES A DAY (Patient not taking: No sig reported), Disp: 500 mL, Rfl: 2  •  sucralfate (CARAFATE) 1 g/10 mL suspension, Take 10 mL (1 g total) by mouth in the morning and 10 mL (1 g total) at noon and 10 mL (1 g total) in the evening and 10 mL (1 g total) before bedtime  Do all this for 7 days   (Patient not taking: Reported on 8/4/2022), Disp: 420 mL, Rfl: 0  •  Symbicort 80-4 5 MCG/ACT inhaler, INHALE 2 PUFFS BY MOUTH TWICE A DAY RINSE MOUTH AFTER USE, Disp: 10 2 g, Rfl: 2  • TiZANidine (ZANAFLEX) 4 MG capsule, Take 1 capsule (4 mg total) by mouth 3 (three) times a day, Disp: 90 capsule, Rfl: 2    Allergies   Allergen Reactions   • Prozac [Fluoxetine Hcl]      SI   • Bactrim [Sulfamethoxazole-Trimethoprim]      Pt "They think that is what cause the pancreatitis"    • Flagyl [Metronidazole] Diarrhea and Abdominal Pain   • Lamictal [Lamotrigine] GI Intolerance   • Lithium Other (See Comments)   • Haldol [Haloperidol] Other (See Comments)     "I don't like it"   • Ibuprofen    • Lexapro [Escitalopram Oxalate] Rash   • Navane [Thiothixene]      SI   • Other      "novaine?" antipsychotic       Physical Exam:    /73   Pulse 56   Ht 5' 2" (1 575 m)   Wt 64 4 kg (142 lb)   LMP  (LMP Unknown)   BMI 25 97 kg/m²     Constitutional:normal, well developed, well nourished, alert, in no distress and non-toxic and no overt pain behavior  Eyes:anicteric  HEENT:grossly intact  Neck:supple, symmetric, trachea midline and no masses   Pulmonary:even and unlabored  Cardiovascular:No edema or pitting edema present  Skin:Normal without rashes or lesions and well hydrated  Psychiatric:Mood and affect appropriate  Neurologic:Cranial Nerves II-XII grossly intact  Musculoskeletal:  Tender to palpation of the paraspinal muscles of the cervical spine  Limited range of motion  DTRs of the upper extremities are equal and intact  Mild decrease in  strength bilaterally        Imaging  FL spine and pain procedure    (Results Pending)         Orders Placed This Encounter   Procedures   • FL spine and pain procedure

## 2022-11-08 DIAGNOSIS — R11.0 NAUSEA: ICD-10-CM

## 2022-11-08 DIAGNOSIS — R10.84 GENERALIZED ABDOMINAL PAIN: ICD-10-CM

## 2022-11-09 DIAGNOSIS — F31.9 BIPOLAR 1 DISORDER (HCC): ICD-10-CM

## 2022-11-10 RX ORDER — HALOPERIDOL 5 MG/1
TABLET ORAL
Qty: 10 TABLET | Refills: 0 | Status: SHIPPED | OUTPATIENT
Start: 2022-11-10

## 2022-11-10 RX ORDER — DEXTROAMPHETAMINE SACCHARATE, AMPHETAMINE ASPARTATE MONOHYDRATE, DEXTROAMPHETAMINE SULFATE AND AMPHETAMINE SULFATE 5; 5; 5; 5 MG/1; MG/1; MG/1; MG/1
20 CAPSULE, EXTENDED RELEASE ORAL 2 TIMES DAILY
Qty: 60 CAPSULE | Refills: 0 | Status: SHIPPED | OUTPATIENT
Start: 2022-11-10
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
## 2022-11-14 ENCOUNTER — OFFICE VISIT (OUTPATIENT)
Dept: PHYSICAL THERAPY | Facility: REHABILITATION | Age: 52
End: 2022-11-14

## 2022-11-14 DIAGNOSIS — R10.2 PELVIC PAIN: ICD-10-CM

## 2022-11-14 DIAGNOSIS — N39.46 URINARY INCONTINENCE, MIXED: Primary | ICD-10-CM

## 2022-11-14 DIAGNOSIS — F32.A DEPRESSION, UNSPECIFIED DEPRESSION TYPE: ICD-10-CM

## 2022-11-15 RX ORDER — BUPROPION HYDROCHLORIDE 300 MG/1
TABLET ORAL
Qty: 30 TABLET | Refills: 1 | Status: SHIPPED | OUTPATIENT
Start: 2022-11-15

## 2022-11-15 NOTE — PROGRESS NOTES
Daily Note     Today's date: 2022  Patient name: Isabel Black  : 1970  MRN: [id number]  Referring provider: Rosibel Cruz MD  Dx:   Encounter Diagnosis     ICD-10-CM    1  Chronic low back pain, unspecified back pain laterality, unspecified whether sciatica present  M54 50     G89 29       2  Pelvic pain  R10 2       3  Urinary incontinence, mixed  N39 46           Start Time: 1115  Stop Time: 1200  Total time in clinic (min): 45 minutes     Subjective: Reports has had lower thoracic and lumbar back pain sine approx 5 years ago  Around that time she had a lot of medical issues and was in/out of the hospital  Regarding her cataplexy/narcolepsy, patient has extensive fall hx and limited functional mobility  Patient believes her cataplexy is related to her emotions  She is working with a new counselor 1x/week to help work on controlling her emotions more  Aggravating factors: prolonged sitting >20 mins (sedentary lifestyle), standing >5, walking (15ft x 5 within house with HHA and no RW),    Ease: Uses TENs unit to manage back pain almost all day everyday  Heat - heating pad or blanket, Massager    Also has leg weakness  Feels back pain is separate from leg weakness and fatigued  Not sure why the cause of the leg weakness  Objective: See treatment diary below    Objective     Neurological Testing     Sensation     Lumbar   Left   Intact: light touch    Right   Diminished: light touch    Reflexes   Left   Patellar (L4): trace (1+)  Achilles (S1): absent (0)    Right   Patellar (L4): trace (1+)  Achilles (S1): absent (0)    Strength/Myotome Testing     Left Hip   Planes of Motion   Flexion: 3+    Right Hip   Planes of Motion   Flexion: 3+    Left Knee   Extension: 3+    Right Knee   Extension: 3    Left Ankle/Foot   Dorsiflexion: 2    Right Ankle/Foot   Dorsiflexion: 2-      Assessment: Tolerated treatment fair to well  Patient would benefit from continued PT   Assessed patient's chronic back pain today  It appears to be due to her decreased mobility 2/2 to her leg weakness and poor control of her cataplexy  Her leg weakness does not appear to have a lumbar etiology as it is grossly weak throughout  Educated patient on spinal mobility exercises with focus on rotational and extension motions as patient is mostly sedentary and in flexed positions throughout the day  Instructed patient on general strengthening of her LE, however recommend to focus on activity modification and recognizing that she likely can only do a few repetitions at a time due to her weakness  Patient to continue pelvic PT to focus on improving her GERARD next session  Goals  1  Urge UI improves by 50% in frequency in 8 weeks  2  GERARD with bed mobility improves by 50% in frequency/severity in 8 weeks  3  Painfree PFM exam in 6 weeks  Plan: Continue per plan of care         Precautions: fall risk    Manuals 10/31 11/14 11/22          Lumbar exam   See above          PFM exam completed            Sigmoid colon/cecum  MFR 10'             Bladder mobility  10'           R iliacus  MFR           Neuro Re-Ed             Pelvic drop             Urge deferral  5'           Defecation mechanics  5'           PF AROM             DB  5'                                     Ther Ex             Hip strengthening  Hip abd/add 10x           LTR  10x           Core strengthening             Open book   10x each side          Prone on elbows   1-2 min          Prone knee flexion   5x each side          Reviewed patient's own HEP   Supine LTR, knees to chest, butterfly stretch, HS stretch                       Ther Activity             Voiding diaries provided reissued           Bladder retraining             Bowel habits  10'

## 2022-11-16 ENCOUNTER — TELEPHONE (OUTPATIENT)
Dept: RADIOLOGY | Facility: MEDICAL CENTER | Age: 52
End: 2022-11-16

## 2022-11-16 NOTE — TELEPHONE ENCOUNTER
Spoke with patient and is at current pain level of 8/10  She reports 75% relief from the home exercises, but wishes to continue with another YANNI  Last was done 5/5/2022 and provided 80% relief  Kept patient scheduled for 12/13/2022 with Dr Linda Mancia

## 2022-11-21 DIAGNOSIS — M79.645 PAIN IN FINGER OF LEFT HAND: ICD-10-CM

## 2022-11-22 ENCOUNTER — OFFICE VISIT (OUTPATIENT)
Dept: PHYSICAL THERAPY | Facility: REHABILITATION | Age: 52
End: 2022-11-22

## 2022-11-22 DIAGNOSIS — N39.46 URINARY INCONTINENCE, MIXED: ICD-10-CM

## 2022-11-22 DIAGNOSIS — R10.2 PELVIC PAIN: ICD-10-CM

## 2022-11-22 DIAGNOSIS — M54.50 CHRONIC LOW BACK PAIN, UNSPECIFIED BACK PAIN LATERALITY, UNSPECIFIED WHETHER SCIATICA PRESENT: Primary | ICD-10-CM

## 2022-11-22 DIAGNOSIS — G89.29 CHRONIC LOW BACK PAIN, UNSPECIFIED BACK PAIN LATERALITY, UNSPECIFIED WHETHER SCIATICA PRESENT: Primary | ICD-10-CM

## 2022-11-25 ENCOUNTER — APPOINTMENT (OUTPATIENT)
Dept: PHYSICAL THERAPY | Facility: REHABILITATION | Age: 52
End: 2022-11-25

## 2022-11-25 DIAGNOSIS — M31.31 GRANULOMATOSIS WITH POLYANGIITIS WITH RENAL INVOLVEMENT (HCC): ICD-10-CM

## 2022-11-25 DIAGNOSIS — G89.4 CHRONIC PAIN SYNDROME: ICD-10-CM

## 2022-11-25 NOTE — PROGRESS NOTES
Daily Note     Today's date: 2022  Patient name: Sonya Rico  : 1970  MRN: [id number]  Referring provider: Paris Neves MD  Dx: No diagnosis found  Subjective: ***      Objective: See treatment diary below      Assessment: Tolerated treatment {Tolerated treatment :}   Patient {assessment:[id number]}      Plan: {PLAN:3124176724}     Precautions: fall risk    Manuals 10/31 11/14 11/22          Lumbar exam   See above          PFM exam completed            Sigmoid colon/cecum  MFR 10'             Bladder mobility  10'           R iliacus  MFR           Neuro Re-Ed             Pelvic drop             Urge deferral  5'           Defecation mechanics  5'           PF AROM             DB  5'                                     Ther Ex             Hip strengthening  Hip abd/add 10x           LTR  10x           Core strengthening             Open book   10x each side          Prone on elbows   1-2 min          Prone knee flexion   5x each side          Reviewed patient's own HEP   Supine LTR, knees to chest, butterfly stretch, HS stretch                       Ther Activity             Voiding diaries provided reissued           Bladder retraining             Bowel habits  10'

## 2022-11-28 DIAGNOSIS — R21 RASH: ICD-10-CM

## 2022-11-28 RX ORDER — OXYCODONE HYDROCHLORIDE 15 MG/1
15 TABLET ORAL EVERY 4 HOURS PRN
Qty: 180 TABLET | Refills: 0 | Status: SHIPPED | OUTPATIENT
Start: 2022-11-28

## 2022-11-30 RX ORDER — CLOBETASOL PROPIONATE 0.5 MG/G
CREAM TOPICAL
Qty: 60 G | Refills: 0 | Status: SHIPPED | OUTPATIENT
Start: 2022-11-30

## 2022-12-01 ENCOUNTER — OFFICE VISIT (OUTPATIENT)
Dept: PHYSICAL THERAPY | Facility: REHABILITATION | Age: 52
End: 2022-12-01

## 2022-12-01 DIAGNOSIS — R10.2 PELVIC PAIN: Primary | ICD-10-CM

## 2022-12-01 DIAGNOSIS — N39.46 URINARY INCONTINENCE, MIXED: ICD-10-CM

## 2022-12-01 NOTE — PROGRESS NOTES
Daily Note     Today's date: 2022  Patient name: Rubens Cordero  : 1970  MRN: [id number]  Referring provider: Juan F Peraza MD  Dx:   Encounter Diagnosis     ICD-10-CM    1  Pelvic pain  R10 2       2  Urinary incontinence, mixed  N39 46           Start Time: 1015  Stop Time: 1046  Total time in clinic (min): 31 minutes    Subjective: Pt feels she has no energy, very fatigued from crying a lot  Had ham for thanksgiving and felt it threw off her sodium and made her very fatigued  Pt continues to experience leakage with no control when she enters the shower after using the hot tub  Objective: See treatment diary below      Assessment: Tolerated treatment fair  Patient would benefit from continued PT  Limited session due to pt's fatigue level and her concern of not being able to go through the rest of her day if she pushes herself too much during our sessions  Encouraged to modify her sitting position since she is mostly caving int to the side with use of pillow or rolled up towel  Also encouraged to use the restroom prior to entering the shower aftre the hot tub  BP assessed due to feeling lighheaded, taken on her R arm at pt's request 110/70  Plan: Continue per plan of care        Precautions: fall risk    Manuals 10/31 11/14 11/22 12/1         Lumbar exam   See above          PFM exam completed            Sigmoid colon/cecum  MFR 10'    10'         Bladder mobility  10'           R iliacus  MFR           Neuro Re-Ed             Pelvic drop             Urge deferral  5'           Defecation mechanics  5'           PF AROM             DB  5'                                     Ther Ex             Hip strengthening  Hip abd/add 10x  10x  10x         LTR  10x  10x         Core strengthening             Open book   10x each side 10x each side         Prone on elbows   1-2 min          Prone knee flexion   5x each side          Reviewed patient's own HEP   Supine LTR, knees to chest, butterfly stretch, HS stretch                       Ther Activity             Voiding diaries provided reissued           Bladder retraining    8'         Bowel habits  10'

## 2022-12-05 NOTE — PROGRESS NOTES
Assessment and Plan:  Liz Lyons is a 46 y o   female who presents as a Rheumatology consult referred by PRASANTH Rodrigez for evaluation of polyarthralgia  Patient complains her bones have been hurting, especially both wrists  Has history of left wrist fracture  Also complains that her knees, fingers, and thighs hurt  This pain started 4 months ago  Was diagnosed with microscopic polyangiitis in 2015 and was on oral Cytoxan, Imuran, and plasmapheresis  This has been in remission for some time  She gets nerve pain that is worse at night  Is willing to start Prolia for her osteoporosis; meets criteria for osteoporosis since she has a history of left wrist fragility fracture in the presence of osteopenia on latest DEXA scan  Since she has a history of kidney disease, the best medication option for treatment of her osteoporosis is Prolia  Has history of DVT  Take 2,000 units of Vit  D daily  Take Tums daily  Do labs  Do hand x-rays  Will work on Prolia every 6 month injection prior auth for osteoporosis    Will call for Prolia injection only visit; return to clinic in 6 months for next Prolia + provider visit    Plan:  Diagnoses and all orders for this visit:    Polyarthralgia  -     Ambulatory Referral to Rheumatology  -     Cyclic citrul peptide antibody, IgG  -     XR knee 3 vw right non injury; Future    Closed fracture of left wrist, sequela    Osteopenia, unspecified location    History of DVT in adulthood  -     Beta-2 glycoprotein antibodies  -     Cardiolipin antibody    High risk medication use  High risk medication use - Prolia (denosumab) is a monoclonal antibody biologic medication that can rapidly utilize the body's calcium and make one susceptible to hypocalcemia; the medication also has a risk of osteonecrosis of the jaw in patients with exposed jaw bone  Patient does not plan on getting any invasive dental procedures in the near future  Follow-up plan:  Will call for Prolia injection only visit; return to clinic in 6 months for next Prolia + provider visit        HPI  Gisela Miller is a 46 y o   female who presents as a Rheumatology consult referred by PRASANTH Marcum for evaluation of polyarthralgia  Broke left wrist about 4 months ago and after that felt that her bones were weak  Knees, fingers, wrists, and thighs hurt the most  Was diagnosed in 2015 with microscopic polyangiitis - nephrology follows her for that - it is in remission  Joint pain is worse in the morning and night  Also as a lot of nerve pain at night  Nerve pain bothers the most  And then the bones in the thighs  History of blood clots in left leg from immobility  Patient states right knee sometimes gets "hot and bothered"  Review of Systems  Review of Systems   Constitutional: Negative for chills and fever  HENT: Negative for ear pain and sore throat  Eyes: Negative for pain and visual disturbance  Respiratory: Negative for cough and shortness of breath  Cardiovascular: Negative for chest pain and palpitations  Gastrointestinal: Negative for abdominal pain and vomiting  Genitourinary: Negative for dysuria and hematuria  Musculoskeletal: Positive for arthralgias and back pain  Skin: Negative for color change and rash  Neurological: Negative for seizures and syncope  All other systems reviewed and are negative  Reviewed and agree      Allergies  Allergies   Allergen Reactions   • Prozac [Fluoxetine Hcl]      SI   • Bactrim [Sulfamethoxazole-Trimethoprim]      Pt "They think that is what cause the pancreatitis"    • Flagyl [Metronidazole] Diarrhea and Abdominal Pain   • Lamictal [Lamotrigine] GI Intolerance   • Lithium Other (See Comments)   • Haldol [Haloperidol] Other (See Comments)     "I don't like it"   • Ibuprofen    • Lexapro [Escitalopram Oxalate] Rash   • Navane [Thiothixene]      SI   • Other      "novaine?" antipsychotic       Home Medications    Current Outpatient Medications:   •  acetaminophen (TYLENOL) 500 mg tablet, Take 2 tablets (1,000 mg total) by mouth every 8 (eight) hours as needed for mild pain, Disp: 60 tablet, Rfl: 1  •  al mag oxide-diphenhydramine-lidocaine viscous (MAGIC MOUTHWASH) 1:1:1 suspension, Swish and spit 10 mL every 4 (four) hours as needed for mouth pain or discomfort, Disp: 90 mL, Rfl: 2  •  albuterol (2 5 mg/3 mL) 0 083 % nebulizer solution, USE 1 VIAL VIA NEBULIZER EVERY 6 HOURS AS NEEDED FOR WHEEZING OR SHORTNESS OF BREATH, Disp: 90 mL, Rfl: 0  •  albuterol (PROVENTIL HFA,VENTOLIN HFA) 90 mcg/act inhaler, INHALE 2 PUFFS BY MOUTH EVERY 6 HOURS AS NEEDED FOR WHEEZING OR SHORTNESS OF BREATH, Disp: 18 g, Rfl: 1  •  Alcohol Swabs (Alcohol Pads) 70 % PADS, Use 4 (four) times a day, Disp: 400 each, Rfl: 2  •  amphetamine-dextroamphetamine (ADDERALL XR) 20 MG 24 hr capsule, Take 1 capsule (20 mg total) by mouth 2 (two) times a day Max Daily Amount: 40 mg, Disp: 60 capsule, Rfl: 0  •  Blood Glucose Monitoring Suppl (FreeStyle Lite) DEIRDRE, Use daily, Disp: 1 each, Rfl: 0  •  Blood Pressure Monitor KIT, Use daily to check blood pressure , Disp: 1 kit, Rfl: 0  •  buPROPion (WELLBUTRIN XL) 300 mg 24 hr tablet, TAKE ONE TABLET BY MOUTH EVERY MORNING, Disp: 30 tablet, Rfl: 1  •  calcium carbonate (OS-ALISIA) 600 MG tablet, Take 1 tablet (600 mg total) by mouth daily, Disp: 90 tablet, Rfl: 1  •  Cholecalciferol (Vitamin D) 50 MCG (2000 UT) tablet, Take 1 tablet (2,000 Units total) by mouth daily, Disp: 90 tablet, Rfl: 1  •  clobetasol (TEMOVATE) 0 05 % cream, APPLY TOPICALLY TO AFFECTED AREA TWICE A DAY, Disp: 60 g, Rfl: 0  •  Comfort Touch Insulin Pen Need 33G X 6 MM MISC, USE TO INJECT INSULIN FOUR TIMES A DAY ( WITH MEALS AND AT BEDTIME ), Disp: 100 each, Rfl: 10  •  Diclofenac Sodium (VOLTAREN) 1 %, APPLY 2GM TOPICALLY FOUR TIMES A DAY, Disp: 200 g, Rfl: 2  •  dicyclomine (BENTYL) 20 mg tablet, Take 1 tablet (20 mg total) by mouth in the morning and 1 tablet (20 mg total) in the evening  Do all this for 7 days  , Disp: 20 tablet, Rfl: 0  •  Easy Comfort Lancets MISC, USE TO TEST THE BLOOD SUGAR THREE TIMES A DAY, Disp: 100 each, Rfl: 6  •  glucose blood (FREESTYLE LITE) test strip, USE TO TEST THE BLOOD SUGAR THREE TIMES A DAY AS DIRECTED, Disp: 100 strip, Rfl: 10  •  haloperidol (HALDOL) 5 mg tablet, TAKE ONE TABLET BY MOUTH ONCE DAILY AS NEEDED FOR ABDOMINAL PAIN / NAUSEA, Disp: 10 tablet, Rfl: 0  •  hydrocortisone 2 5 % cream, Apply topically 2 (two) times a day as needed for rash, Disp: 30 g, Rfl: 1  •  insulin glargine (Toujeo Max SoloStar) 300 units/mL CONCENTRATED U-300 injection pen (2-unit dial), Inject 22 Units under the skin daily, Disp: 9 mL, Rfl: 1  •  lidocaine (LMX) 4 % cream, Apply topically as needed for mild pain, Disp: 30 g, Rfl: 0  •  lidocaine (XYLOCAINE) 5 % ointment, APPLY TOPICALLY 2GM TWICE A DAY TO AFFECTED AREAS AS NEEDED FOR MILD PAIN (Patient taking differently: Lidocaine patches), Disp: 250 g, Rfl: 8  •  Linzess 72 MCG CAPS, TAKE ONE CAPSULE BY MOUTH ONCE DAILY, Disp: 90 capsule, Rfl: 10  •  naloxone (Narcan) 4 mg/0 1 mL nasal spray, ADMINISTER 1 SPRAY IN ONE NOSTRIL IF NO REPONSE AFTER 2-3 MINUTES SPRAY INTO OTHER NOSTRIL WITH NEW spray, Disp: 2 each, Rfl: 2  •  NON FORMULARY, [facility], Disp: , Rfl:   •  NovoLOG FlexPen 100 units/mL injection pen, INJECT 6 UNITS UNDER THE SKIN THREE TIMES A DAY WITH MEALS, Disp: 15 mL, Rfl: 1  •  nystatin-triamcinolone (MYCOLOG-II) ointment, Apply topically 2 (two) times a day, Disp: 30 g, Rfl: 1  •  ondansetron (Zofran ODT) 8 mg disintegrating tablet, Take 1 tablet (8 mg total) by mouth every 8 (eight) hours as needed for nausea or vomiting, Disp: 30 tablet, Rfl: 2  •  oxyCODONE (Roxicodone) 15 mg immediate release tablet, Take 1 tablet (15 mg total) by mouth every 4 (four) hours as needed for moderate pain Max Daily Amount: 90 mg, Disp: 180 tablet, Rfl: 0  •  pantoprazole (PROTONIX) 40 mg tablet, Take 1 tablet (40 mg total) by mouth 2 (two) times a day before meals, Disp: 180 tablet, Rfl: 1  •  pregabalin (LYRICA) 100 mg capsule, Take 1 capsule (100 mg total) by mouth 3 (three) times a day, Disp: 90 capsule, Rfl: 1  •  pregabalin (LYRICA) 25 mg capsule, Take 1 capsule (25 mg total) by mouth 2 (two) times a day, Disp: 60 capsule, Rfl: 2  •  promethazine (PHENERGAN) 25 mg tablet, Take 1 tablet (25 mg total) by mouth every 6 (six) hours as needed for nausea or vomiting, Disp: 60 tablet, Rfl: 3  •  Restasis 0 05 % ophthalmic emulsion, , Disp: , Rfl:   •  scopolamine (TRANSDERM-SCOP) 1 5 mg/3 days TD 72 hr patch, Place 1 patch on the skin every third day, Disp: 10 patch, Rfl: 0  •  sucralfate (CARAFATE) 1 g/10 mL suspension, TAKE 10ML BY MOUTH THREE TIMES A DAY (Patient not taking: Reported on 8/4/2022), Disp: 500 mL, Rfl: 2  •  sucralfate (CARAFATE) 1 g/10 mL suspension, Take 10 mL (1 g total) by mouth in the morning and 10 mL (1 g total) at noon and 10 mL (1 g total) in the evening and 10 mL (1 g total) before bedtime  Do all this for 7 days  (Patient not taking: Reported on 8/4/2022), Disp: 420 mL, Rfl: 0  •  Symbicort 80-4 5 MCG/ACT inhaler, INHALE 2 PUFFS BY MOUTH TWICE A DAY RINSE MOUTH AFTER USE, Disp: 10 2 g, Rfl: 2  •  TiZANidine (ZANAFLEX) 4 MG capsule, Take 1 capsule (4 mg total) by mouth 3 (three) times a day, Disp: 90 capsule, Rfl: 2    Past Medical History  Past Medical History:   Diagnosis Date   • ADHD    • Anemia of chronic disease    • Anxiety    • Arthritis ? • Asthma    • Bipolar disorder ([facility] [address] )    • Borderline personality disorder ([facility] )    • Cataplexy    • Chronic abdominal pain    • Chronic kidney disease ? • CKD (chronic kidney disease) stage 3, GFR 30-59 ml/min ([facility])    • Cushing syndrome (HCC)    • Depression ?    • Diabetes mellitus ([address] )    • DVT (deep venous thrombosis) (HCC)    • GERD (gastroesophageal reflux disease)    • Headache(784 0) 3 months   • History of acute pancreatitis felt secondary to Bactrim   • History of transfusion    • Hypertension    • Liver disease     fatty liver   • Microscopic polyangiitis (HCC)    • Ovarian cyst    • PTSD (post-traumatic stress disorder)    • Self-inflicted injury     self inflicted skin wounds   • Sleep apnea    • Wegener's granulomatosis with renal involvement ([facility] [address] ) 2015       Past Surgical History   Past Surgical History:   Procedure Laterality Date   • ESOPHAGOGASTRODUODENOSCOPY  09/11/2015    mild antral gastritis   • GASTRIC STIMULATOR IMPLANT SURGERY  06/25/2020   • MO COLONOSCOPY FLX DX W/COLLJ SPEC WHEN PFRMD N/A 12/14/2018    adenoma removed from the transverse, hyperplastic polyp removed from the left colon   • MO ESOPHAGOGASTRODUODENOSCOPY TRANSORAL DIAGNOSTIC N/A 12/14/2018    gastritis and scant coffee-ground material   Biopsies negative for H  pylori   • MO OPEN TX RADIAL & ULNAR SHAFT FX FIX RADIUS AND ULNA Left 6/23/2022    Procedure: OPEN REDUCTION W/ INTERNAL FIXATION (ORIF) RADIUS / ULNA (WRIST); Surgeon: Cesia Woodard MD;  Location: [facility];  Service: Orthopedics   • RELEASE SCAR CONTRACTURE / GRAFT REPAIRS OF HAND Bilateral    • UPPER GASTROINTESTINAL ENDOSCOPY  12/26/2019    Dr Jodi Schmidt  Botox to the pylorus       Family History    Family History   Problem Relation Age of Onset   • Arthritis Mother    • Depression Mother    • Diabetes Mother    • Mental illness Mother    • Migraines Mother    • No Known Problems Father    • Colon cancer Neg Hx    • Drug abuse Neg Hx         mother father   • Mental illness Neg Hx         disorder, mother father   • Cancer Neg Hx    • Breast cancer Neg Hx      No known family history of autoimmune or inflammatory diseases      Social History  Occupation: retired, 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  Social History     Substance and Sexual Activity   Alcohol Use Never     Social History     Substance and Sexual Activity   Drug Use Yes   • Types: Marijuana    Comment: marijuana daily     Social History     Tobacco Use   Smoking Status Former   • Packs/day: 1 00   • Years: 10    • Pack years: 10 00   • Types: Cigarettes   • Quit date: 2011   • Years since quittin 9   Smokeless Tobacco Never   Tobacco Comments    Stopped smoking 11 years ago       Objective:  Vitals:    22 1305   BP: 105/68   Weight: 59 4 kg (131 lb)   Height: 5' 2" (1 575 m)       Physical Exam  Constitutional:       General: She is not in acute distress  HENT:      Head: Normocephalic and atraumatic  Eyes:      Conjunctiva/sclera: Conjunctivae normal    Cardiovascular:      Rate and Rhythm: Normal rate and regular rhythm  Heart sounds: S1 normal and S2 normal      No friction rub  Pulmonary:      Effort: Pulmonary effort is normal  No respiratory distress  Breath sounds: Normal breath sounds  No wheezing, rhonchi or rales  Musculoskeletal:         General: Tenderness present  Cervical back: Neck supple  Comments: Right wrist, pip, dip tenderness  Right knee, right ankle, right mtp tenderness  Left si joint tenderness  Spine tenderness   Skin:     General: Skin is warm and dry  Coloration: Skin is not pale  Findings: No rash  Neurological:      Mental Status: She is alert  Mental status is at baseline  Psychiatric:         Mood and Affect: Mood normal          Behavior: Behavior normal        Reviewed labs and imaging  Imaging:  CT abdomen pelvis w contrast  Colonic diverticulosis      Stable left adrenal adenoma      Simple left paraovarian cyst unchanged from multiple prior studies  US pelvis 22  Subcentimeter intramural myoma  2 4 cm left paraovarian cyst     DXA spine hip and pelvis 22  1  Low bone mass (osteopenia)  3   The 10 year risk of hip fracture is 0 6% with the 10 year risk of major osteoporotic fracture being 8 8% as calculated by the [facility]/WHO fracture risk assessment tool (FRAX)    3   The current NOF guidelines recommend treating patients with a T-score of -2 5 or less in the lumbar spine or hips, or in post-menopausal women and men over the age of 48 with low bone mass (osteopenia) and a FRAX 10 year risk score of >3% for hip   fracture and/or >20% for major osteoporotic fracture  4   The NOF recommends follow-up DXA in 1-2 years after initiating therapy for osteoporosis and every 2 years thereafter  More frequent evaluation is appropriate for patients with conditions associated with rapid bone loss, such as glucocorticoid   therapy  The interval between DXA screenings may be longer for individuals without major risk factors and initial T-score in the normal or upper low bone mass range  XR chest 7/27/22  Stable alignment of Colles' fracture status post ventral plate and screw fixation, unchanged    CT head wo contrast 7/26/22  Unremarkable    XR left wrist 7/5/22  Right TMJ demonstrates suboptimal alignment; is this subluxation or dislocation? Finding appears similar to prior exam Recommend clinical correlation  XR right wrist 6/29/22  Unremarkable     XR lumbar spine 6/27/22  Unremarkable     XR left wrist 6/23/22  Imaging guidance provided during ORIF of the left wrist   Please refer to the separate procedure notes for additional details      Labs:   Admission on 10/17/2022, Discharged on 10/17/2022   Component Date Value Ref Range Status   • WBC 10/17/2022 12 57 (H)  4 31 - 10 16 Thousand/uL Final   • RBC 10/17/2022 5 23 (H)  3 81 - 5 12 Million/uL Final   • Hemoglobin 10/17/2022 16 0 (H)  11 5 - 15 4 g/dL Final   • Hematocrit 10/17/2022 47 0 (H)  34 8 - 46 1 % Final   • MCV 10/17/2022 90  82 - 98 fL Final   • MCH 10/17/2022 30 6  26 8 - 34 3 pg Final   • MCHC 10/17/2022 34 0  31 4 - 37 4 g/dL Final   • RDW 10/17/2022 13 2  11 6 - 15 1 % Final   • MPV 10/17/2022 10 7  8 9 - 12 7 fL Final   • Platelets 81/03/4039 309  149 - 390 Thousands/uL Final   • Sodium 10/17/2022 141  135 - 147 mmol/L Final   • Potassium 10/17/2022 4 0  3 5 - 5 3 mmol/L Final   • Chloride 10/17/2022 102  96 - 108 mmol/L Final   • CO2 10/17/2022 21  21 - 32 mmol/L Final   • ANION GAP 10/17/2022 18 (H)  4 - 13 mmol/L Final   • BUN 10/17/2022 32 (H)  5 - 25 mg/dL Final   • Creatinine 10/17/2022 1 78 (H)  0 60 - 1 30 mg/dL Final    Standardized to IDMS reference method   • Glucose 10/17/2022 185 (H)  65 - 140 mg/dL Final    If the patient is fasting, the ADA then defines impaired fasting glucose as > 100 mg/dL and diabetes as > or equal to 123 mg/dL  Specimen collection should occur prior to Sulfasalazine administration due to the potential for falsely depressed results  Specimen collection should occur prior to Sulfapyridine administration due to the potential for falsely elevated results  • Calcium 10/17/2022 10 1  8 3 - 10 1 mg/dL Final   • AST 10/17/2022 20  5 - 45 U/L Final    Specimen collection should occur prior to Sulfasalazine administration due to the potential for falsely depressed results  • ALT 10/17/2022 23  12 - 78 U/L Final    Specimen collection should occur prior to Sulfasalazine administration due to the potential for falsely depressed results  • Alkaline Phosphatase 10/17/2022 132 (H)  46 - 116 U/L Final   • Total Protein 10/17/2022 9 7 (H)  6 4 - 8 4 g/dL Final   • Albumin 10/17/2022 4 3  3 5 - 5 0 g/dL Final   • Total Bilirubin 10/17/2022 0 39  0 20 - 1 00 mg/dL Final    Use of this assay is not recommended for patients undergoing treatment with eltrombopag due to the potential for falsely elevated results     • eGFR 10/17/2022 32  ml/min/1 73sq m Final   • Lipase 10/17/2022 295  73 - 393 u/L Final   • Segmented % 10/17/2022 88 (H)  43 - 75 % Final   • Bands % 10/17/2022 1  0 - 8 % Final   • Lymphocytes % 10/17/2022 4 (L)  14 - 44 % Final   • Monocytes % 10/17/2022 7  4 - 12 % Final   • Eosinophils, % 10/17/2022 0  0 - 6 % Final   • Basophils % 10/17/2022 0  0 - 1 % Final   • Absolute Neutrophils 10/17/2022 11 19 (H)  1 85 - 7 62 Thousand/uL Final   • Lymphocytes Absolute 10/17/2022 0 50 (L)  0 60 - 4 47 Thousand/uL Final   • Monocytes Absolute 10/17/2022 0 88  0 00 - 1 22 Thousand/uL Final   • Eosinophils Absolute 10/17/2022 0 00  0 00 - 0 40 Thousand/uL Final   • Basophils Absolute 10/17/2022 0 00  0 00 - 0 10 Thousand/uL Final   • RBC Morphology 10/17/2022 Normal   Final   • Platelet Estimate 39/90/5731 Adequate  Adequate Final   Admission on 10/17/2022, Discharged on 10/17/2022   Component Date Value Ref Range Status   • WBC 10/17/2022 7 26  4 31 - 10 16 Thousand/uL Final   • RBC 10/17/2022 4 68  3 81 - 5 12 Million/uL Final   • Hemoglobin 10/17/2022 14 3  11 5 - 15 4 g/dL Final   • Hematocrit 10/17/2022 43 7  34 8 - 46 1 % Final   • MCV 10/17/2022 93  82 - 98 fL Final   • MCH 10/17/2022 30 6  26 8 - 34 3 pg Final   • MCHC 10/17/2022 32 7  31 4 - 37 4 g/dL Final   • RDW 10/17/2022 13 3  11 6 - 15 1 % Final   • MPV 10/17/2022 10 2  8 9 - 12 7 fL Final   • Platelets 53/87/1000 268  149 - 390 Thousands/uL Final   • nRBC 10/17/2022 0  /100 WBCs Final   • Neutrophils Relative 10/17/2022 59  43 - 75 % Final   • Immat GRANS % 10/17/2022 0  0 - 2 % Final   • Lymphocytes Relative 10/17/2022 32  14 - 44 % Final   • Monocytes Relative 10/17/2022 8  4 - 12 % Final   • Eosinophils Relative 10/17/2022 1  0 - 6 % Final   • Basophils Relative 10/17/2022 0  0 - 1 % Final   • Neutrophils Absolute 10/17/2022 4 30  1 85 - 7 62 Thousands/µL Final   • Immature Grans Absolute 10/17/2022 0 01  0 00 - 0 20 Thousand/uL Final   • Lymphocytes Absolute 10/17/2022 2 31  0 60 - 4 47 Thousands/µL Final   • Monocytes Absolute 10/17/2022 0 59  0 17 - 1 22 Thousand/µL Final   • Eosinophils Absolute 10/17/2022 0 04  0 00 - 0 61 Thousand/µL Final   • Basophils Absolute 10/17/2022 0 01  0 00 - 0 10 Thousands/µL Final   • Sodium 10/17/2022 142  135 - 147 mmol/L Final   • Potassium 10/17/2022 3 9  3 5 - 5 3 mmol/L Final   • Chloride 10/17/2022 107  96 - 108 mmol/L Final   • CO2 10/17/2022 23  21 - 32 mmol/L Final   • ANION GAP 10/17/2022 12  4 - 13 mmol/L Final   • BUN 10/17/2022 40 (H)  5 - 25 mg/dL Final   • Creatinine 10/17/2022 1 81 (H)  0 60 - 1 30 mg/dL Final    Standardized to IDMS reference method   • Glucose 10/17/2022 140  65 - 140 mg/dL Final    If the patient is fasting, the ADA then defines impaired fasting glucose as > 100 mg/dL and diabetes as > or equal to 123 mg/dL  Specimen collection should occur prior to Sulfasalazine administration due to the potential for falsely depressed results  Specimen collection should occur prior to Sulfapyridine administration due to the potential for falsely elevated results  • Calcium 10/17/2022 9 9  8 3 - 10 1 mg/dL Final   • AST 10/17/2022 13  5 - 45 U/L Final    Specimen collection should occur prior to Sulfasalazine administration due to the potential for falsely depressed results  • ALT 10/17/2022 20  12 - 78 U/L Final    Specimen collection should occur prior to Sulfasalazine administration due to the potential for falsely depressed results  • Alkaline Phosphatase 10/17/2022 127 (H)  46 - 116 U/L Final   • Total Protein 10/17/2022 8 7 (H)  6 4 - 8 4 g/dL Final   • Albumin 10/17/2022 3 9  3 5 - 5 0 g/dL Final   • Total Bilirubin 10/17/2022 0 21  0 20 - 1 00 mg/dL Final    Use of this assay is not recommended for patients undergoing treatment with eltrombopag due to the potential for falsely elevated results     • eGFR 10/17/2022 31  ml/min/1 73sq m Final   • Lipase 10/17/2022 349  73 - 393 u/L Final   • EXT PREG TEST UR (Ref: Negative) 10/17/2022 Negative (-)   Final   • Control 10/17/2022 Valid   Final   • Color, UA 10/17/2022 Yellow   Final   • Clarity, UA 10/17/2022 Clear   Final   • pH, UA 10/17/2022 5 5  4 5 - 8 0 Final   • Leukocytes, UA 10/17/2022 Negative  Negative Final   • Nitrite, UA 10/17/2022 Negative  Negative Final   • Protein, UA 10/17/2022 100 (2+) (A)  Negative mg/dl Final   • Glucose, UA 10/17/2022 Negative  Negative mg/dl Final   • Ketones, UA 10/17/2022 Negative  Negative mg/dl Final   • Urobilinogen, UA 10/17/2022 0 2  0 2, 1 0 E U /dl E U /dl Final   • Bilirubin, UA 10/17/2022 Negative  Negative Final   • Occult Blood, UA 10/17/2022 Trace (A)  Negative Final   • Specific Gravity, UA 10/17/2022 1 020  1 003 - 1 030 Final   • RBC, UA 10/17/2022 None Seen  None Seen, 2-4 /hpf Final   • WBC, UA 10/17/2022 1-2 (A)  None Seen, 2-4, 5-60 /hpf Final   • Epithelial Cells 10/17/2022 Occasional  None Seen, Occasional /hpf Final   • Bacteria, UA 10/17/2022 Occasional  None Seen, Occasional /hpf Final   • Ventricular Rate 10/17/2022 54  BPM Final   • Atrial Rate 10/17/2022 54  BPM Final   • GA Interval 10/17/2022 158  ms Final   • QRSD Interval 10/17/2022 66  ms Final   • QT Interval 10/17/2022 434  ms Final   • QTC Interval 10/17/2022 411  ms Final   • P Axis 10/17/2022 70  degrees Final   • QRS Axis 10/17/2022 4  degrees Final   • T Wave Axis 10/17/2022 39  degrees Final   Office Visit on 07/27/2022   Component Date Value Ref Range Status   • Amphetamine Screen, Ur 07/27/2022 Positive (A)  Kclryd=8465 Final    Please Note:                                                          01  Amphetamine test includes Amphetamine and Methamphetamine  Amphetamine               Positive        A                         01  Amphetamine Conf, MS, UR    >5000              ng/mL Vvdfcj=656       01    Methamphetamine           Negative            Fymnav=651            01   • Barbiturate Screen, Ur 07/27/2022 Negative  Vxizhj=088 ng/mL Final   • Cannabinoid Scrn, Ur 07/27/2022 Positive (A)  Cutoff=50 Final    Carboxy THC Conf, MS, UR    >300               ng/mL Cutoff=15        01   • Methadone Screen, Urine 07/27/2022 Negative  Tijhtx=389 ng/mL Final   • Opiate Scrn, Ur 07/27/2022 Negative  Qcxqmc=509 Final    Opiate test includes Codeine and Morphine only     • Phencyclidine (PCP), Qual, Ur 07/27/2022 Negative  Cutoff=25 ng/mL Final   • Benzodiazepines 07/27/2022 Negative  Ogswyb=219 ng/mL Final   • Cocaine (Metab ) Urine 07/27/2022 Negative  Oktzdu=296 ng/mL Final   • Propoxyphene Screen, Urine 07/27/2022 Negative  Sbiqgt=470 ng/mL Final   • OXYCODONE/OXYMORPHONE 07/27/2022 See Final Results  Yshhqj=314 ng/mL Final    Test includes Oxycodone and Oxymorphone   • OXYCODONE/OXYMORPHONE 07/27/2022 Positive (A)  Gopgnn=118 Final    Test includes Oxycodone and Oxymorphone   • Oxycodone 07/27/2022 Positive (A)   Final   • Oxycodone (GC/MS) 07/27/2022 2182  Pscfwy=863 ng/mL Final   • OXYMORPHONE 07/27/2022 Positive (A)   Final   • OXYMORPHONE GC/MS 07/27/2022 1831  Prbieg=397 ng/mL Final   Admission on 07/26/2022, Discharged on 07/27/2022   Component Date Value Ref Range Status   • WBC 07/27/2022 7 18  4 31 - 10 16 Thousand/uL Final   • RBC 07/27/2022 4 26  3 81 - 5 12 Million/uL Final   • Hemoglobin 07/27/2022 12 9  11 5 - 15 4 g/dL Final   • Hematocrit 07/27/2022 39 3  34 8 - 46 1 % Final   • MCV 07/27/2022 92  82 - 98 fL Final   • MCH 07/27/2022 30 3  26 8 - 34 3 pg Final   • MCHC 07/27/2022 32 8  31 4 - 37 4 g/dL Final   • RDW 07/27/2022 13 2  11 6 - 15 1 % Final   • MPV 07/27/2022 10 8  8 9 - 12 7 fL Final   • Platelets 88/71/3041 207  149 - 390 Thousands/uL Final   • nRBC 07/27/2022 0  /100 WBCs Final   • Neutrophils Relative 07/27/2022 76 (H)  43 - 75 % Final   • Immat GRANS % 07/27/2022 0  0 - 2 % Final   • Lymphocytes Relative 07/27/2022 16  14 - 44 % Final   • Monocytes Relative 07/27/2022 8  4 - 12 % Final   • Eosinophils Relative 07/27/2022 0  0 - 6 % Final   • Basophils Relative 07/27/2022 0  0 - 1 % Final   • Neutrophils Absolute 07/27/2022 5 44  1 85 - 7 62 Thousands/µL Final   • Immature Grans Absolute 07/27/2022 0 02  0 00 - 0 20 Thousand/uL Final   • Lymphocytes Absolute 07/27/2022 1 13  0 60 - 4 47 Thousands/µL Final   • Monocytes Absolute 07/27/2022 0 54  0 17 - 1 22 Thousand/µL Final   • Eosinophils Absolute 07/27/2022 0 02  0 00 - 0 61 Thousand/µL Final   • Basophils Absolute 07/27/2022 0 03  0 00 - 0 10 Thousands/µL Final   • Sodium 07/27/2022 142  135 - 147 mmol/L Final   • Potassium 07/27/2022 4 1  3 5 - 5 3 mmol/L Final   • Chloride 07/27/2022 105  96 - 108 mmol/L Final   • CO2 07/27/2022 25  21 - 32 mmol/L Final   • ANION GAP 07/27/2022 12  4 - 13 mmol/L Final   • BUN 07/27/2022 33 (H)  5 - 25 mg/dL Final   • Creatinine 07/27/2022 1 94 (H)  0 60 - 1 30 mg/dL Final    Standardized to IDMS reference method   • Glucose 07/27/2022 108  65 - 140 mg/dL Final    If the patient is fasting, the ADA then defines impaired fasting glucose as > 100 mg/dL and diabetes as > or equal to 123 mg/dL  Specimen collection should occur prior to Sulfasalazine administration due to the potential for falsely depressed results  Specimen collection should occur prior to Sulfapyridine administration due to the potential for falsely elevated results  • Calcium 07/27/2022 8 9  8 3 - 10 1 mg/dL Final   • AST 07/27/2022 21  5 - 45 U/L Final    Specimen collection should occur prior to Sulfasalazine administration due to the potential for falsely depressed results  • ALT 07/27/2022 23  12 - 78 U/L Final    Specimen collection should occur prior to Sulfasalazine administration due to the potential for falsely depressed results  • Alkaline Phosphatase 07/27/2022 116  46 - 116 U/L Final   • Total Protein 07/27/2022 8 1  6 4 - 8 4 g/dL Final   • Albumin 07/27/2022 3 7  3 5 - 5 0 g/dL Final   • Total Bilirubin 07/27/2022 0 32  0 20 - 1 00 mg/dL Final    Use of this assay is not recommended for patients undergoing treatment with eltrombopag due to the potential for falsely elevated results     • eGFR 07/27/2022 29  ml/min/1 73sq m Final   • hs TnI 0hr 07/27/2022 21  "Refer to ACS Flowchart"- see link ng/L Final    Comment:                                              Initial (time 0) result  If >=50 ng/L, Myocardial injury suggested ;  Type of myocardial injury and treatment strategy  to be determined  If 5-49 ng/L, a delta result at 2 hours and or 4 hours will be needed to further evaluate  If <4 ng/L, and chest pain has been >3 hours since onset, patient may qualify for discharge based on the HEART score in the ED  If <5 ng/L and <3hours since onset of chest pain, a delta result at 2 hours will be needed to further evaluate  HS Troponin 99th Percentile URL of a Health Population=12 ng/L with a 95% Confidence Interval of 8-18 ng/L  Second Troponin (time 2 hours)  If calculated delta >= 20 ng/L,  Myocardial injury suggested ; Type of myocardial injury and treatment strategy to be determined  If 5-49 ng/L and the calculated delta is 5-19 ng/L, consult medical service for evaluation  Continue evaluation for ischemia on ecg and other possible etiology and repeat hs troponin at 4 hours  If delta                            is <5 ng/L at 2 hours, consider discharge based on risk stratification via the HEART score (if in ED), or JARRET risk score in IP/Observation  HS Troponin 99th Percentile URL of a Health Population=12 ng/L with a 95% Confidence Interval of 8-18 ng/L  • hs TnI 2hr 07/27/2022 18  "Refer to ACS Flowchart"- see link ng/L Final    Comment:                                              Initial (time 0) result  If >=50 ng/L, Myocardial injury suggested ;  Type of myocardial injury and treatment strategy  to be determined  If 5-49 ng/L, a delta result at 2 hours and or 4 hours will be needed to further evaluate  If <4 ng/L, and chest pain has been >3 hours since onset, patient may qualify for discharge based on the HEART score in the ED  If <5 ng/L and <3hours since onset of chest pain, a delta result at 2 hours will be needed to further evaluate  HS Troponin 99th Percentile URL of a Health Population=12 ng/L with a 95% Confidence Interval of 8-18 ng/L      Second Troponin (time 2 hours)  If calculated delta >= 20 ng/L,  Myocardial injury suggested ; Type of myocardial injury and treatment strategy to be determined  If 5-49 ng/L and the calculated delta is 5-19 ng/L, consult medical service for evaluation  Continue evaluation for ischemia on ecg and other possible etiology and repeat hs troponin at 4 hours  If delta                            is <5 ng/L at 2 hours, consider discharge based on risk stratification via the HEART score (if in ED), or JARRET risk score in IP/Observation  HS Troponin 99th Percentile URL of a Health Population=12 ng/L with a 95% Confidence Interval of 8-18 ng/L  • Delta 2hr hsTnI 07/27/2022 -3  <20 ng/L Final   Lab on 07/20/2022   Component Date Value Ref Range Status   • WBC 07/20/2022 5 04  4 31 - 10 16 Thousand/uL Final   • RBC 07/20/2022 4 24  3 81 - 5 12 Million/uL Final   • Hemoglobin 07/20/2022 12 9  11 5 - 15 4 g/dL Final   • Hematocrit 07/20/2022 40 7  34 8 - 46 1 % Final   • MCV 07/20/2022 96  82 - 98 fL Final   • MCH 07/20/2022 30 4  26 8 - 34 3 pg Final   • MCHC 07/20/2022 31 7  31 4 - 37 4 g/dL Final   • RDW 07/20/2022 13 7  11 6 - 15 1 % Final   • Platelets 48/11/7552 215  149 - 390 Thousands/uL Final   • MPV 07/20/2022 11 8  8 9 - 12 7 fL Final   • Uric Acid 07/20/2022 6 4  2 0 - 7 5 mg/dL Final    Specimen collection should occur prior to Metamizole administration due to the potential for falsely depressed results     • PTH 07/20/2022 87 7 (H)  18 4 - 80 1 pg/mL Final   • Sodium 07/20/2022 143  135 - 147 mmol/L Final   • Potassium 07/20/2022 4 7  3 5 - 5 3 mmol/L Final   • Chloride 07/20/2022 113 (H)  96 - 108 mmol/L Final   • CO2 07/20/2022 24  21 - 32 mmol/L Final   • ANION GAP 07/20/2022 6  4 - 13 mmol/L Final   • BUN 07/20/2022 27 (H)  5 - 25 mg/dL Final   • Creatinine 07/20/2022 2 09 (H)  0 60 - 1 30 mg/dL Final    Standardized to IDMS reference method   • Glucose, Fasting 07/20/2022 94  65 - 99 mg/dL Final    Specimen collection should occur prior to Sulfasalazine administration due to the potential for falsely depressed results  Specimen collection should occur prior to Sulfapyridine administration due to the potential for falsely elevated results  • Calcium 07/20/2022 8 9  8 3 - 10 1 mg/dL Final   • Corrected Calcium 07/20/2022 9 4  8 3 - 10 1 mg/dL Final   • AST 07/20/2022 20  5 - 45 U/L Final    Specimen collection should occur prior to Sulfasalazine administration due to the potential for falsely depressed results  • ALT 07/20/2022 18  12 - 78 U/L Final    Specimen collection should occur prior to Sulfasalazine and/or Sulfapyridine administration due to the potential for falsely depressed results  • Alkaline Phosphatase 07/20/2022 108  46 - 116 U/L Final   • Total Protein 07/20/2022 7 3  6 4 - 8 4 g/dL Final   • Albumin 07/20/2022 3 4 (L)  3 5 - 5 0 g/dL Final   • Total Bilirubin 07/20/2022 0 25  0 20 - 1 00 mg/dL Final    Use of this assay is not recommended for patients undergoing treatment with eltrombopag due to the potential for falsely elevated results  • eGFR 07/20/2022 26  ml/min/1 73sq m Final   • C-ANCA 07/20/2022 <1:20  Neg:<1:20 titer Final   • Atypical pANCA 07/20/2022 <1:20  Neg:<1:20 titer Final    The atypical pANCA pattern has been observed in a significant  percentage of patients with ulcerative colitis, primary sclerosing  cholangitis and autoimmune hepatitis  • MPO AB 07/20/2022 0 5  0 0 - 0 9 units Final   • KY-3 AB 07/20/2022 <0 2  0 0 - 0 9 units Final   • P-ANCA 07/20/2022 <1:20  Neg:<1:20 titer Final    The presence of positive fluorescence exhibiting P-ANCA or C-ANCA  patterns alone is not specific for the diagnosis of Wegener's  Granulomatosis (WG) or microscopic polyangiitis  Decisions about  treatment should not be based solely on ANCA IFA results  The  International ANCA Group Consensus recommends follow up testing of  positive sera with both KY-3 and MPO-ANCA enzyme immunoassays  As  many as 5% serum samples are positive only by EIA  Ref   AM J Clin Pathol 1999;111:507-513     • Ventricular Rate 07/27/2022 76  BPM Corrected   • Atrial Rate 07/27/2022 76  BPM Corrected   • KS Interval 07/27/2022 150  ms Corrected   • QRSD Interval 07/27/2022 64  ms Corrected   • QT Interval 07/27/2022 384  ms Corrected   • QTC Interval 07/27/2022 432  ms Corrected   • P Axis 07/27/2022 58  degrees Corrected   • QRS Axis 07/27/2022 -24  degrees Corrected   • T Wave Axis 07/27/2022 24  degrees Corrected   • Ventricular Rate 07/26/2022 62  BPM Corrected   • Atrial Rate 07/26/2022 62  BPM Corrected   • KS Interval 07/26/2022 160  ms Corrected   • QRSD Interval 07/26/2022 64  ms Corrected   • QT Interval 07/26/2022 404  ms Corrected   • QTC Interval 07/26/2022 410  ms Corrected   • P Axis 07/26/2022 83  degrees Corrected   • QRS Axis 07/26/2022 1  degrees Corrected   • T Wave Axis 07/26/2022 38  degrees Corrected   Admission on 06/23/2022, Discharged on 06/23/2022   Component Date Value Ref Range Status   • POC Glucose 06/23/2022 90  65 - 140 mg/dl Final   • POC Glucose 06/23/2022 108  65 - 140 mg/dl Final

## 2022-12-06 ENCOUNTER — APPOINTMENT (OUTPATIENT)
Dept: PHYSICAL THERAPY | Facility: REHABILITATION | Age: 52
End: 2022-12-06

## 2022-12-08 ENCOUNTER — OFFICE VISIT (OUTPATIENT)
Dept: PHYSICAL THERAPY | Facility: REHABILITATION | Age: 52
End: 2022-12-08

## 2022-12-08 DIAGNOSIS — M54.50 CHRONIC LOW BACK PAIN, UNSPECIFIED BACK PAIN LATERALITY, UNSPECIFIED WHETHER SCIATICA PRESENT: ICD-10-CM

## 2022-12-08 DIAGNOSIS — N39.46 URINARY INCONTINENCE, MIXED: ICD-10-CM

## 2022-12-08 DIAGNOSIS — G89.29 CHRONIC LOW BACK PAIN, UNSPECIFIED BACK PAIN LATERALITY, UNSPECIFIED WHETHER SCIATICA PRESENT: ICD-10-CM

## 2022-12-08 DIAGNOSIS — F31.9 BIPOLAR 1 DISORDER (HCC): ICD-10-CM

## 2022-12-08 DIAGNOSIS — R10.2 PELVIC PAIN: Primary | ICD-10-CM

## 2022-12-08 NOTE — PROGRESS NOTES
Daily Note     Today's date: 2022  Patient name: Darryl Calabrese  : 1970  MRN: [id number]  Referring provider: Kenzie Thayer MD  Dx:   Encounter Diagnosis     ICD-10-CM    1  Pelvic pain  R10 2       2  Urinary incontinence, mixed  N39 46       3  Chronic low back pain, unspecified back pain laterality, unspecified whether sciatica present  M54 50     G89 29           Start Time: 1230  Stop Time: 1315  Total time in clinic (min): 45 minutes    Subjective: Pt feels better than last session but continues to be very fatigued  Denies any changes with the leakage, if anything maybe a little worst "but I do feel stronger"  Objective: See treatment diary below      Assessment: Tolerated treatment fair  Patient would benefit from continued PT  Pt given tactile cueing externally over perineum to improve ability to perform a pelvic drop, continues to be challenging  Good tolerance to hooklying exercises, more challenged with prone could only manitain propped up on her elbows for 10-12 seconds  Plan: Continue per plan of care        Precautions: fall risk    Manuals 10/31 11/14 11/22 12/1 12/08        Lumbar exam   See above          PFM exam completed            Sigmoid colon/cecum  MFR 10'    10'         Bladder mobility  10'           R iliacus  MFR           Neuro Re-Ed             Pelvic drop     10' very challenging        Urge deferral  5'           Defecation mechanics  5'           PF AROM             DB  5'                                     Ther Ex             Hip strengthening  Hip abd/add 10x  10x  10x 10x        LTR  10x  10x 10x        Small marches     10x        Core strengthening             Open book   10x each side 10x each side         Prone on elbows   1-2 min  5x10" unable to maintain        Prone knee flexion   5x each side  5x on L side, unable on R due to causing back pain        Reviewed patient's own HEP   Supine LTR, knees to chest, butterfly stretch, HS stretch pulleys     2'        Ther Activity             Voiding diaries provided reissued           Bladder retraining    8'         Bowel habits  10'

## 2022-12-09 ENCOUNTER — OFFICE VISIT (OUTPATIENT)
Dept: RHEUMATOLOGY | Facility: CLINIC | Age: 52
End: 2022-12-09

## 2022-12-09 ENCOUNTER — OFFICE VISIT (OUTPATIENT)
Dept: OBGYN CLINIC | Facility: CLINIC | Age: 52
End: 2022-12-09

## 2022-12-09 VITALS
BODY MASS INDEX: 24.11 KG/M2 | HEIGHT: 62 IN | WEIGHT: 131 LBS | SYSTOLIC BLOOD PRESSURE: 105 MMHG | DIASTOLIC BLOOD PRESSURE: 68 MMHG

## 2022-12-09 VITALS
HEART RATE: 56 BPM | DIASTOLIC BLOOD PRESSURE: 68 MMHG | WEIGHT: 131 LBS | BODY MASS INDEX: 23.96 KG/M2 | SYSTOLIC BLOOD PRESSURE: 100 MMHG

## 2022-12-09 DIAGNOSIS — Z86.718 HISTORY OF DVT IN ADULTHOOD: ICD-10-CM

## 2022-12-09 DIAGNOSIS — S62.102S CLOSED FRACTURE OF LEFT WRIST, SEQUELA: ICD-10-CM

## 2022-12-09 DIAGNOSIS — K31.84 GASTROPARESIS: ICD-10-CM

## 2022-12-09 DIAGNOSIS — N39.46 URINARY INCONTINENCE, MIXED: Primary | ICD-10-CM

## 2022-12-09 DIAGNOSIS — N36.42 INTRINSIC URETHRAL SPHINCTER DEFICIENCY: ICD-10-CM

## 2022-12-09 DIAGNOSIS — Z79.899 HIGH RISK MEDICATION USE: ICD-10-CM

## 2022-12-09 DIAGNOSIS — M85.80 OSTEOPENIA, UNSPECIFIED LOCATION: ICD-10-CM

## 2022-12-09 DIAGNOSIS — M25.50 POLYARTHRALGIA: Primary | ICD-10-CM

## 2022-12-09 PROBLEM — K59.09 OTHER CONSTIPATION: Status: ACTIVE | Noted: 2022-12-09

## 2022-12-09 RX ORDER — DEXTROAMPHETAMINE SACCHARATE, AMPHETAMINE ASPARTATE MONOHYDRATE, DEXTROAMPHETAMINE SULFATE AND AMPHETAMINE SULFATE 5; 5; 5; 5 MG/1; MG/1; MG/1; MG/1
20 CAPSULE, EXTENDED RELEASE ORAL 2 TIMES DAILY
Qty: 60 CAPSULE | Refills: 0 | Status: SHIPPED | OUTPATIENT
Start: 2022-12-09
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
## 2022-12-09 NOTE — PATIENT INSTRUCTIONS
Take 2,000 units of Vit   D daily  Take Tums daily  Do labs  Do hand x-rays and right knee x-rays  Will work on Prolia every 6 month injection for your bone strength    Will call for injection only visit; return to clinic in 6 months for next Prolia + provider visit

## 2022-12-13 ENCOUNTER — TELEPHONE (OUTPATIENT)
Dept: PAIN MEDICINE | Facility: MEDICAL CENTER | Age: 52
End: 2022-12-13

## 2022-12-13 ENCOUNTER — HOSPITAL ENCOUNTER (OUTPATIENT)
Dept: RADIOLOGY | Facility: MEDICAL CENTER | Age: 52
Discharge: HOME | End: 2022-12-13

## 2022-12-13 VITALS
SYSTOLIC BLOOD PRESSURE: 111 MMHG | TEMPERATURE: 97.6 F | DIASTOLIC BLOOD PRESSURE: 75 MMHG | HEART RATE: 50 BPM | OXYGEN SATURATION: 99 % | RESPIRATION RATE: 20 BRPM

## 2022-12-13 DIAGNOSIS — M54.12 CERVICAL RADICULOPATHY: ICD-10-CM

## 2022-12-13 PROCEDURE — 3E0S33Z INTRODUCTION OF ANTI-INFLAMMATORY INTO EPIDURAL SPACE, PERCUTANEOUS APPROACH: ICD-10-PCS | Performed by: PHYSICAL MEDICINE & REHABILITATION

## 2022-12-13 RX ORDER — METHYLPREDNISOLONE ACETATE 80 MG/ML
80 INJECTION, SUSPENSION INTRA-ARTICULAR; INTRALESIONAL; INTRAMUSCULAR; PARENTERAL; SOFT TISSUE ONCE
Status: COMPLETED | OUTPATIENT
Start: 2022-12-13 | End: 2022-12-13

## 2022-12-13 RX ADMIN — METHYLPREDNISOLONE ACETATE 80 MG: 80 INJECTION, SUSPENSION INTRA-ARTICULAR; INTRALESIONAL; INTRAMUSCULAR; PARENTERAL; SOFT TISSUE at 16:04

## 2022-12-13 RX ADMIN — IOHEXOL 2 ML: 300 INJECTION, SOLUTION INTRAVENOUS at 16:04

## 2022-12-13 NOTE — TELEPHONE ENCOUNTER
Pt here for cervical injection with JE  Pt last had bilat SIJ on 3/21/22 with KW/  Per pt she currently has a pain level of 8/10 the bilat SIJ lasted   At 85% for 9 months  Just started to come back approx 2-3 months ago and now pt is falling from the pain  Pt would like to repeat this if possible  --please advise thank you  Repeat bilat SIJ?

## 2022-12-13 NOTE — H&P
History of Present Illness: The patient is a 46 y o  female who presents with complaints of neck and arm pain    Past Medical History:   Diagnosis Date   • ADHD    • Anemia of chronic disease    • Anxiety    • Arthritis ? • Asthma    • Bipolar disorder ([place] [id number] )    • Borderline personality disorder ([place] [id number] )    • Cataplexy    • Chronic abdominal pain    • Chronic kidney disease ? • CKD (chronic kidney disease) stage 3, GFR 30-59 ml/min (HCC)    • Cushing syndrome (HCC)    • Depression ? • Diabetes mellitus ([place] [id number] )    • DVT (deep venous thrombosis) ([facility])    • GERD (gastroesophageal reflux disease)    • Headache(784 0) 3 months   • History of acute pancreatitis     felt secondary to Bactrim   • History of transfusion    • Hypertension    • Liver disease     fatty liver   • Microscopic polyangiitis (HCC)    • Ovarian cyst    • PTSD (post-traumatic stress disorder)    • Self-inflicted injury     self inflicted skin wounds   • Sleep apnea    • Wegener's granulomatosis with renal involvement ([place] [id number] ) 2015       Past Surgical History:   Procedure Laterality Date   • ESOPHAGOGASTRODUODENOSCOPY  09/11/2015    mild antral gastritis   • GASTRIC STIMULATOR IMPLANT SURGERY  06/25/2020   • MS COLONOSCOPY FLX DX W/COLLJ SPEC WHEN PFRMD N/A 12/14/2018    adenoma removed from the transverse, hyperplastic polyp removed from the left colon   • MS ESOPHAGOGASTRODUODENOSCOPY TRANSORAL DIAGNOSTIC N/A 12/14/2018    gastritis and scant coffee-ground material   Biopsies negative for H  pylori   • MS OPEN TX RADIAL & ULNAR SHAFT FX FIX RADIUS AND ULNA Left 6/23/2022    Procedure: OPEN REDUCTION W/ INTERNAL FIXATION (ORIF) RADIUS / ULNA ([facility]ST); Surgeon: Charles Maxwell MD;  Location: [facility];  Service: Orthopedics   • RELEASE SCAR CONTRACTURE / GRAFT REPAIRS OF HAND Bilateral    • UPPER GASTROINTESTINAL ENDOSCOPY  12/26/2019    Dr Sid Mosley   Botox to the pylorus         Current Outpatient Medications:   •  acetaminophen (TYLENOL) 500 mg tablet, Take 2 tablets (1,000 mg total) by mouth every 8 (eight) hours as needed for mild pain, Disp: 60 tablet, Rfl: 1  •  al mag oxide-diphenhydramine-lidocaine viscous (MAGIC MOUTHWASH) 1:1:1 suspension, Swish and spit 10 mL every 4 (four) hours as needed for mouth pain or discomfort, Disp: 90 mL, Rfl: 2  •  albuterol (2 5 mg/3 mL) 0 083 % nebulizer solution, USE 1 VIAL VIA NEBULIZER EVERY 6 HOURS AS NEEDED FOR WHEEZING OR SHORTNESS OF BREATH, Disp: 90 mL, Rfl: 0  •  albuterol (PROVENTIL HFA,VENTOLIN HFA) 90 mcg/act inhaler, INHALE 2 PUFFS BY MOUTH EVERY 6 HOURS AS NEEDED FOR WHEEZING OR SHORTNESS OF BREATH, Disp: 18 g, Rfl: 1  •  Alcohol Swabs (Alcohol Pads) 70 % PADS, Use 4 (four) times a day, Disp: 400 each, Rfl: 2  •  amphetamine-dextroamphetamine (ADDERALL XR) 20 MG 24 hr capsule, Take 1 capsule (20 mg total) by mouth 2 (two) times a day Max Daily Amount: 40 mg, Disp: 60 capsule, Rfl: 0  •  Blood Glucose Monitoring Suppl (FreeStyle Lite) DEIRDRE, Use daily, Disp: 1 each, Rfl: 0  •  Blood Pressure Monitor KIT, Use daily to check blood pressure , Disp: 1 kit, Rfl: 0  •  buPROPion (WELLBUTRIN XL) 300 mg 24 hr tablet, TAKE ONE TABLET BY MOUTH EVERY MORNING, Disp: 30 tablet, Rfl: 1  •  calcium carbonate (OS-ALISIA) 600 MG tablet, Take 1 tablet (600 mg total) by mouth daily, Disp: 90 tablet, Rfl: 1  •  Cholecalciferol (Vitamin D) 50 MCG (2000 UT) tablet, Take 1 tablet (2,000 Units total) by mouth daily, Disp: 90 tablet, Rfl: 1  •  clobetasol (TEMOVATE) 0 05 % cream, APPLY TOPICALLY TO AFFECTED AREA TWICE A DAY, Disp: 60 g, Rfl: 0  •  Comfort Touch Insulin Pen Need 33G X 6 MM MISC, USE TO INJECT INSULIN FOUR TIMES A DAY ( WITH MEALS AND AT BEDTIME ), Disp: 100 each, Rfl: 10  •  Diclofenac Sodium (VOLTAREN) 1 %, APPLY TWO GRAMS TOPICALLY FOUR TIMES A DAY, Disp: 200 g, Rfl: 0  •  dicyclomine (BENTYL) 20 mg tablet, Take 1 tablet (20 mg total) by mouth in the morning and 1 tablet (20 mg total) in the evening  Do all this for 7 days  , Disp: 20 tablet, Rfl: 0  •  Easy Comfort Lancets MISC, USE TO TEST THE BLOOD SUGAR THREE TIMES A DAY, Disp: 100 each, Rfl: 6  •  glucose blood (FREESTYLE LITE) test strip, USE TO TEST THE BLOOD SUGAR THREE TIMES A DAY AS DIRECTED, Disp: 100 strip, Rfl: 10  •  haloperidol (HALDOL) 5 mg tablet, TAKE ONE TABLET BY MOUTH ONCE DAILY AS NEEDED FOR ABDOMINAL PAIN / NAUSEA, Disp: 10 tablet, Rfl: 0  •  hydrocortisone 2 5 % cream, Apply topically 2 (two) times a day as needed for rash, Disp: 30 g, Rfl: 1  •  insulin glargine (Toujeo Max SoloStar) 300 units/mL CONCENTRATED U-300 injection pen (2-unit dial), Inject 22 Units under the skin daily, Disp: 9 mL, Rfl: 1  •  lidocaine (LMX) 4 % cream, Apply topically as needed for mild pain, Disp: 30 g, Rfl: 0  •  lidocaine (XYLOCAINE) 5 % ointment, APPLY TOPICALLY 2GM TWICE A DAY TO AFFECTED AREAS AS NEEDED FOR MILD PAIN (Patient taking differently: Lidocaine patches), Disp: 250 g, Rfl: 8  •  Linzess 72 MCG CAPS, TAKE ONE CAPSULE BY MOUTH ONCE DAILY, Disp: 90 capsule, Rfl: 10  •  naloxone (Narcan) 4 mg/0 1 mL nasal spray, ADMINISTER 1 SPRAY IN ONE NOSTRIL IF NO REPONSE AFTER 2-3 MINUTES SPRAY INTO OTHER NOSTRIL WITH NEW spray, Disp: 2 each, Rfl: 2  •  NON FORMULARY, [facility], Disp: , Rfl:   •  NovoLOG FlexPen 100 units/mL injection pen, INJECT 6 UNITS UNDER THE SKIN THREE TIMES A DAY WITH MEALS, Disp: 15 mL, Rfl: 1  •  nystatin-triamcinolone (MYCOLOG-II) ointment, Apply topically 2 (two) times a day, Disp: 30 g, Rfl: 1  •  ondansetron (Zofran ODT) 8 mg disintegrating tablet, Take 1 tablet (8 mg total) by mouth every 8 (eight) hours as needed for nausea or vomiting, Disp: 30 tablet, Rfl: 2  •  oxyCODONE (Roxicodone) 15 mg immediate release tablet, Take 1 tablet (15 mg total) by mouth every 4 (four) hours as needed for moderate pain Max Daily Amount: 90 mg, Disp: 180 tablet, Rfl: 0  •  pantoprazole (PROTONIX) 40 mg tablet, Take 1 tablet (40 mg total) by mouth 2 (two) times a day before meals, Disp: 180 tablet, Rfl: 1  •  pregabalin (LYRICA) 100 mg capsule, Take 1 capsule (100 mg total) by mouth 3 (three) times a day, Disp: 90 capsule, Rfl: 1  •  pregabalin (LYRICA) 25 mg capsule, Take 1 capsule (25 mg total) by mouth 2 (two) times a day, Disp: 60 capsule, Rfl: 2  •  promethazine (PHENERGAN) 25 mg tablet, Take 1 tablet (25 mg total) by mouth every 6 (six) hours as needed for nausea or vomiting, Disp: 60 tablet, Rfl: 3  •  Restasis 0 05 % ophthalmic emulsion, , Disp: , Rfl:   •  scopolamine (TRANSDERM-SCOP) 1 5 mg/3 days TD 72 hr patch, Place 1 patch on the skin every third day, Disp: 10 patch, Rfl: 0  •  sucralfate (CARAFATE) 1 g/10 mL suspension, TAKE 10ML BY MOUTH THREE TIMES A DAY (Patient not taking: Reported on 8/4/2022), Disp: 500 mL, Rfl: 2  •  sucralfate (CARAFATE) 1 g/10 mL suspension, Take 10 mL (1 g total) by mouth in the morning and 10 mL (1 g total) at noon and 10 mL (1 g total) in the evening and 10 mL (1 g total) before bedtime  Do all this for 7 days   (Patient not taking: Reported on 8/4/2022), Disp: 420 mL, Rfl: 0  •  Symbicort 80-4 5 MCG/ACT inhaler, INHALE 2 PUFFS BY MOUTH TWICE A DAY RINSE MOUTH AFTER USE, Disp: 10 2 g, Rfl: 2  •  TiZANidine (ZANAFLEX) 4 MG capsule, Take 1 capsule (4 mg total) by mouth 3 (three) times a day, Disp: 90 capsule, Rfl: 2    Allergies   Allergen Reactions   • Prozac [Fluoxetine Hcl]      SI   • Bactrim [Sulfamethoxazole-Trimethoprim]      Pt "They think that is what cause the pancreatitis"    • Flagyl [Metronidazole] Diarrhea and Abdominal Pain   • Lamictal [Lamotrigine] GI Intolerance   • Lithium Other (See Comments)   • Haldol [Haloperidol] Other (See Comments)     "I don't like it"   • Ibuprofen    • Lexapro [Escitalopram Oxalate] Rash   • Navane [Thiothixene]      SI   • Other      "novaine?" antipsychotic       Physical Exam:   Vitals:    12/13/22 1537   BP: 103/68   Pulse: (!) 52   Resp: 20   Temp: 97 6 °F (36 4 °C)   SpO2: 97% General: Awake, Alert, Oriented x 3, Mood and affect appropriate  Respiratory: Respirations even and unlabored  Cardiovascular: Peripheral pulses intact; no edema  Musculoskeletal Exam: neck and arm pain    ASA Score: 2    Patient/Chart Verification  Patient ID Verified: Verbal  ID Band Applied: No  Consents Confirmed: Procedural, To be obtained in the Pre-Procedure area  H&P( within 30 days) Verified: To be obtained in the Pre-Procedure area  Interval H&P(within 24 hr) Complete (required for Outpatients and Surgery Admit only): To be obtained in the Pre-Procedure area  Allergies Reviewed: Yes  Anticoag/NSAID held?: NA  Currently on antibiotics?: No  Pregnancy denied?: Yes    Assessment:   1   Cervical radiculopathy        Plan: YANNI C7-T1

## 2022-12-13 NOTE — DISCHARGE INSTRUCTIONS
Epidural Steroid Injection   WHAT YOU NEED TO KNOW:   An epidural steroid injection (DUC) is a procedure to inject steroid medicine into the epidural space  The epidural space is between your spinal cord and vertebrae  Steroids reduce inflammation and fluid buildup in your spine that may be causing pain  You may be given pain medicine along with the steroids  ACTIVITY  Do not drive or operate machinery today  No strenuous activity today - bending, lifting, etc   You may resume normal activites starting tomorrow - start slowly and as tolerated  You may shower today, but no tub baths or hot tubs  You may have numbness for several hours from the local anesthetic  Please use caution and common sense, especially with weight-bearing activities  CARE OF THE INJECTION SITE  If you have soreness or pain, apply ice to the area today (20 minutes on/20 minutes off)  Starting tomorrow, you may use warm, moist heat or ice if needed  You may have an increase or change in your discomfort for 36-48 hours after your treatment  Apply ice and continue with any pain medication you have been prescribed  Notify the [facility] if you have any of the following: redness, drainage, swelling, headache, stiff neck or fever above 100°F     SPECIAL INSTRUCTIONS  Our office will contact you in approximately 7 days for a progress report  MEDICATIONS  Continue to take all routine medications  Our office may have instructed you to hold some medications  As no general anesthesia was used in today's procedure, you should not experience any side effects related to anesthesia  If you are diabetic, the steroids used in today's injection may temporarily increase your blood sugar levels after the first few days after your injection  Please keep a close eye on your sugars and alert the doctor who manages your diabetes if your sugars are significantly high from your baseline or you are symptomatic       If you have a problem specifically related to your procedure, please call our office at [phone]  Problems not related to your procedure should be directed to your primary care physician

## 2022-12-15 ENCOUNTER — TELEPHONE (OUTPATIENT)
Dept: RHEUMATOLOGY | Facility: CLINIC | Age: 52
End: 2022-12-15

## 2022-12-15 ENCOUNTER — APPOINTMENT (INPATIENT)
Dept: ULTRASOUND IMAGING | Facility: HOSPITAL | Age: 52
End: 2022-12-15

## 2022-12-15 ENCOUNTER — APPOINTMENT (OUTPATIENT)
Dept: PHYSICAL THERAPY | Facility: REHABILITATION | Age: 52
End: 2022-12-15

## 2022-12-15 ENCOUNTER — APPOINTMENT (EMERGENCY)
Dept: CT IMAGING | Facility: HOSPITAL | Age: 52
End: 2022-12-15

## 2022-12-15 ENCOUNTER — HOSPITAL ENCOUNTER (INPATIENT)
Facility: HOSPITAL | Age: 52
LOS: 1 days | Discharge: HOME | End: 2022-12-16
Attending: EMERGENCY MEDICINE | Admitting: STUDENT IN AN ORGANIZED HEALTH CARE EDUCATION/TRAINING PROGRAM

## 2022-12-15 DIAGNOSIS — R11.2 NAUSEA WITH VOMITING: ICD-10-CM

## 2022-12-15 DIAGNOSIS — K31.84 GASTROPARESIS: ICD-10-CM

## 2022-12-15 DIAGNOSIS — M46.1 BILATERAL SACROILIITIS (HCC): ICD-10-CM

## 2022-12-15 DIAGNOSIS — K85.90 PANCREATITIS: Primary | ICD-10-CM

## 2022-12-15 DIAGNOSIS — N18.4 CHRONIC KIDNEY DISEASE, STAGE 4 (SEVERE) (HCC): ICD-10-CM

## 2022-12-15 DIAGNOSIS — J10.1 INFLUENZA A: ICD-10-CM

## 2022-12-15 PROBLEM — I15.8 OTHER SECONDARY HYPERTENSION: Status: ACTIVE | Noted: 2022-12-15

## 2022-12-15 PROBLEM — M13.0 POLYARTHRITIS: Status: ACTIVE | Noted: 2022-12-15

## 2022-12-15 LAB
ALBUMIN SERPL BCP-MCNC: 4.2 G/DL (ref 3.5–5)
ALP SERPL-CCNC: 124 U/L (ref 46–116)
ALT SERPL W P-5'-P-CCNC: 24 U/L (ref 12–78)
ANION GAP SERPL CALCULATED.3IONS-SCNC: 14 MMOL/L (ref 4–13)
AST SERPL W P-5'-P-CCNC: 24 U/L (ref 5–45)
BACTERIA UR QL AUTO: ABNORMAL /HPF
BASOPHILS # BLD AUTO: 0.01 THOUSANDS/ÂΜL (ref 0–0.1)
BASOPHILS NFR BLD AUTO: 0 % (ref 0–1)
BILIRUB DIRECT SERPL-MCNC: 0.15 MG/DL (ref 0–0.2)
BILIRUB SERPL-MCNC: 0.36 MG/DL (ref 0.2–1)
BILIRUB UR QL STRIP: NEGATIVE
BUN SERPL-MCNC: 36 MG/DL (ref 5–25)
CALCIUM SERPL-MCNC: 9.9 MG/DL (ref 8.3–10.1)
CHLORIDE SERPL-SCNC: 108 MMOL/L (ref 96–108)
CLARITY UR: CLEAR
CO2 SERPL-SCNC: 20 MMOL/L (ref 21–32)
COLOR UR: YELLOW
CREAT SERPL-MCNC: 1.77 MG/DL (ref 0.6–1.3)
EOSINOPHIL # BLD AUTO: 0.01 THOUSAND/ÂΜL (ref 0–0.61)
EOSINOPHIL NFR BLD AUTO: 0 % (ref 0–6)
ERYTHROCYTE [DISTWIDTH] IN BLOOD BY AUTOMATED COUNT: 12.9 % (ref 11.6–15.1)
GFR SERPL CREATININE-BSD FRML MDRD: 32 ML/MIN/1.73SQ M
GLUCOSE SERPL-MCNC: 132 MG/DL (ref 65–140)
GLUCOSE SERPL-MCNC: 150 MG/DL (ref 65–140)
GLUCOSE SERPL-MCNC: 165 MG/DL (ref 65–140)
GLUCOSE UR STRIP-MCNC: NEGATIVE MG/DL
HCT VFR BLD AUTO: 40.7 % (ref 34.8–46.1)
HGB BLD-MCNC: 13.8 G/DL (ref 11.5–15.4)
HGB UR QL STRIP.AUTO: ABNORMAL
IMM GRANULOCYTES # BLD AUTO: 0.06 THOUSAND/UL (ref 0–0.2)
IMM GRANULOCYTES NFR BLD AUTO: 1 % (ref 0–2)
KETONES UR STRIP-MCNC: ABNORMAL MG/DL
LEUKOCYTE ESTERASE UR QL STRIP: NEGATIVE
LIPASE SERPL-CCNC: 989 U/L (ref 73–393)
LYMPHOCYTES # BLD AUTO: 0.38 THOUSANDS/ÂΜL (ref 0.6–4.47)
LYMPHOCYTES NFR BLD AUTO: 3 % (ref 14–44)
MCH RBC QN AUTO: 31.3 PG (ref 26.8–34.3)
MCHC RBC AUTO-ENTMCNC: 33.9 G/DL (ref 31.4–37.4)
MCV RBC AUTO: 92 FL (ref 82–98)
MONOCYTES # BLD AUTO: 1.16 THOUSAND/ÂΜL (ref 0.17–1.22)
MONOCYTES NFR BLD AUTO: 10 % (ref 4–12)
NEUTROPHILS # BLD AUTO: 10.07 THOUSANDS/ÂΜL (ref 1.85–7.62)
NEUTS SEG NFR BLD AUTO: 86 % (ref 43–75)
NITRITE UR QL STRIP: NEGATIVE
NON-SQ EPI CELLS URNS QL MICRO: ABNORMAL /HPF
NRBC BLD AUTO-RTO: 0 /100 WBCS
PH UR STRIP.AUTO: 5.5 [PH] (ref 4.5–8)
PLATELET # BLD AUTO: 240 THOUSANDS/UL (ref 149–390)
PMV BLD AUTO: 10.4 FL (ref 8.9–12.7)
POTASSIUM SERPL-SCNC: 3.7 MMOL/L (ref 3.5–5.3)
PROT SERPL-MCNC: 8.7 G/DL (ref 6.4–8.4)
PROT UR STRIP-MCNC: >=300 MG/DL
RBC # BLD AUTO: 4.41 MILLION/UL (ref 3.81–5.12)
RBC #/AREA URNS AUTO: ABNORMAL /HPF
SODIUM SERPL-SCNC: 142 MMOL/L (ref 135–147)
SP GR UR STRIP.AUTO: >=1.03 (ref 1–1.03)
UROBILINOGEN UR QL STRIP.AUTO: 0.2 E.U./DL
WBC # BLD AUTO: 11.69 THOUSAND/UL (ref 4.31–10.16)
WBC #/AREA URNS AUTO: ABNORMAL /HPF

## 2022-12-15 RX ORDER — INSULIN LISPRO 100 [IU]/ML
1-5 INJECTION, SOLUTION INTRAVENOUS; SUBCUTANEOUS
Status: DISCONTINUED | OUTPATIENT
Start: 2022-12-15 | End: 2022-12-16 | Stop reason: HOSPADM

## 2022-12-15 RX ORDER — BUDESONIDE AND FORMOTEROL FUMARATE DIHYDRATE 80; 4.5 UG/1; UG/1
2 AEROSOL RESPIRATORY (INHALATION) 2 TIMES DAILY
Status: DISCONTINUED | OUTPATIENT
Start: 2022-12-15 | End: 2022-12-16 | Stop reason: HOSPADM

## 2022-12-15 RX ORDER — SODIUM CHLORIDE 9 MG/ML
125 INJECTION, SOLUTION INTRAVENOUS CONTINUOUS
Status: DISCONTINUED | OUTPATIENT
Start: 2022-12-15 | End: 2022-12-16 | Stop reason: HOSPADM

## 2022-12-15 RX ORDER — PREGABALIN 25 MG/1
25 CAPSULE ORAL 2 TIMES DAILY
Status: DISCONTINUED | OUTPATIENT
Start: 2022-12-15 | End: 2022-12-16 | Stop reason: HOSPADM

## 2022-12-15 RX ORDER — HYDROMORPHONE HCL IN WATER/PF 6 MG/30 ML
0.2 PATIENT CONTROLLED ANALGESIA SYRINGE INTRAVENOUS
Status: DISCONTINUED | OUTPATIENT
Start: 2022-12-15 | End: 2022-12-16 | Stop reason: HOSPADM

## 2022-12-15 RX ORDER — DEXTROAMPHETAMINE SACCHARATE, AMPHETAMINE ASPARTATE, DEXTROAMPHETAMINE SULFATE AND AMPHETAMINE SULFATE 2.5; 2.5; 2.5; 2.5 MG/1; MG/1; MG/1; MG/1
20 TABLET ORAL
Status: DISCONTINUED | OUTPATIENT
Start: 2022-12-16 | End: 2022-12-16 | Stop reason: HOSPADM

## 2022-12-15 RX ORDER — WATER 1000 ML/1000ML
INJECTION, SOLUTION INTRAVENOUS
Status: COMPLETED
Start: 2022-12-15 | End: 2022-12-15

## 2022-12-15 RX ORDER — HYDRALAZINE HYDROCHLORIDE 20 MG/ML
10 INJECTION INTRAMUSCULAR; INTRAVENOUS EVERY 6 HOURS PRN
Status: DISCONTINUED | OUTPATIENT
Start: 2022-12-15 | End: 2022-12-16 | Stop reason: HOSPADM

## 2022-12-15 RX ORDER — INSULIN LISPRO 100 [IU]/ML
6 INJECTION, SOLUTION INTRAVENOUS; SUBCUTANEOUS
Status: DISCONTINUED | OUTPATIENT
Start: 2022-12-15 | End: 2022-12-16 | Stop reason: HOSPADM

## 2022-12-15 RX ORDER — OXYCODONE HYDROCHLORIDE 10 MG/1
20 TABLET ORAL EVERY 4 HOURS PRN
Status: DISCONTINUED | OUTPATIENT
Start: 2022-12-15 | End: 2022-12-16 | Stop reason: HOSPADM

## 2022-12-15 RX ORDER — INSULIN GLARGINE 100 [IU]/ML
22 INJECTION, SOLUTION SUBCUTANEOUS EVERY MORNING
Status: DISCONTINUED | OUTPATIENT
Start: 2022-12-16 | End: 2022-12-16 | Stop reason: HOSPADM

## 2022-12-15 RX ORDER — BUPROPION HYDROCHLORIDE 150 MG/1
300 TABLET ORAL DAILY
Status: DISCONTINUED | OUTPATIENT
Start: 2022-12-15 | End: 2022-12-16 | Stop reason: HOSPADM

## 2022-12-15 RX ORDER — PANTOPRAZOLE SODIUM 40 MG/10ML
40 INJECTION, POWDER, LYOPHILIZED, FOR SOLUTION INTRAVENOUS EVERY 12 HOURS SCHEDULED
Status: DISCONTINUED | OUTPATIENT
Start: 2022-12-15 | End: 2022-12-16 | Stop reason: HOSPADM

## 2022-12-15 RX ORDER — ONDANSETRON 2 MG/ML
4 INJECTION INTRAMUSCULAR; INTRAVENOUS ONCE
Status: COMPLETED | OUTPATIENT
Start: 2022-12-15 | End: 2022-12-15

## 2022-12-15 RX ORDER — HYDRALAZINE HYDROCHLORIDE 20 MG/ML
5 INJECTION INTRAMUSCULAR; INTRAVENOUS ONCE
Status: COMPLETED | OUTPATIENT
Start: 2022-12-15 | End: 2022-12-15

## 2022-12-15 RX ORDER — IODIXANOL 320 MG/ML
100 INJECTION, SOLUTION INTRAVASCULAR
Status: COMPLETED | OUTPATIENT
Start: 2022-12-15 | End: 2022-12-15

## 2022-12-15 RX ORDER — METOCLOPRAMIDE HYDROCHLORIDE 5 MG/ML
10 INJECTION INTRAMUSCULAR; INTRAVENOUS EVERY 6 HOURS PRN
Status: DISCONTINUED | OUTPATIENT
Start: 2022-12-15 | End: 2022-12-16 | Stop reason: HOSPADM

## 2022-12-15 RX ORDER — DOCUSATE SODIUM 100 MG/1
100 CAPSULE, LIQUID FILLED ORAL 2 TIMES DAILY
Status: DISCONTINUED | OUTPATIENT
Start: 2022-12-15 | End: 2022-12-16 | Stop reason: HOSPADM

## 2022-12-15 RX ORDER — PREGABALIN 50 MG/1
100 CAPSULE ORAL 3 TIMES DAILY
Status: DISCONTINUED | OUTPATIENT
Start: 2022-12-15 | End: 2022-12-16 | Stop reason: HOSPADM

## 2022-12-15 RX ORDER — LIDOCAINE 50 MG/G
1 PATCH TOPICAL 2 TIMES DAILY PRN
Status: DISCONTINUED | OUTPATIENT
Start: 2022-12-15 | End: 2022-12-16 | Stop reason: HOSPADM

## 2022-12-15 RX ORDER — ONDANSETRON 2 MG/ML
4 INJECTION INTRAMUSCULAR; INTRAVENOUS EVERY 6 HOURS PRN
Status: DISCONTINUED | OUTPATIENT
Start: 2022-12-15 | End: 2022-12-16 | Stop reason: HOSPADM

## 2022-12-15 RX ORDER — TIZANIDINE 4 MG/1
4 TABLET ORAL 3 TIMES DAILY
Status: DISCONTINUED | OUTPATIENT
Start: 2022-12-15 | End: 2022-12-16 | Stop reason: HOSPADM

## 2022-12-15 RX ORDER — HYDROMORPHONE HCL/PF 1 MG/ML
0.5 SYRINGE (ML) INJECTION ONCE
Status: COMPLETED | OUTPATIENT
Start: 2022-12-15 | End: 2022-12-15

## 2022-12-15 RX ORDER — ACETAMINOPHEN 325 MG/1
650 TABLET ORAL EVERY 6 HOURS PRN
Status: DISCONTINUED | OUTPATIENT
Start: 2022-12-15 | End: 2022-12-16 | Stop reason: HOSPADM

## 2022-12-15 RX ORDER — METOCLOPRAMIDE HYDROCHLORIDE 5 MG/ML
10 INJECTION INTRAMUSCULAR; INTRAVENOUS ONCE
Status: COMPLETED | OUTPATIENT
Start: 2022-12-15 | End: 2022-12-15

## 2022-12-15 RX ORDER — OLANZAPINE 10 MG/1
10 INJECTION, POWDER, LYOPHILIZED, FOR SOLUTION INTRAMUSCULAR ONCE
Status: COMPLETED | OUTPATIENT
Start: 2022-12-15 | End: 2022-12-15

## 2022-12-15 RX ORDER — HALOPERIDOL 5 MG/1
5 TABLET ORAL DAILY PRN
Status: DISCONTINUED | OUTPATIENT
Start: 2022-12-15 | End: 2022-12-16 | Stop reason: HOSPADM

## 2022-12-15 RX ORDER — WATER 1000 ML/1000ML
2.1 INJECTION, SOLUTION INTRAVENOUS ONCE
Status: COMPLETED | OUTPATIENT
Start: 2022-12-15 | End: 2022-12-15

## 2022-12-15 RX ORDER — FAMOTIDINE 10 MG/ML
20 INJECTION, SOLUTION INTRAVENOUS ONCE
Status: COMPLETED | OUTPATIENT
Start: 2022-12-15 | End: 2022-12-15

## 2022-12-15 RX ORDER — ALBUTEROL SULFATE 90 UG/1
2 AEROSOL, METERED RESPIRATORY (INHALATION) EVERY 6 HOURS PRN
Status: DISCONTINUED | OUTPATIENT
Start: 2022-12-15 | End: 2022-12-16 | Stop reason: HOSPADM

## 2022-12-15 RX ADMIN — METOCLOPRAMIDE 10 MG: 5 INJECTION, SOLUTION INTRAMUSCULAR; INTRAVENOUS at 18:32

## 2022-12-15 RX ADMIN — INSULIN LISPRO 1 UNITS: 100 INJECTION, SOLUTION INTRAVENOUS; SUBCUTANEOUS at 21:02

## 2022-12-15 RX ADMIN — METOCLOPRAMIDE 10 MG: 5 INJECTION, SOLUTION INTRAMUSCULAR; INTRAVENOUS at 11:16

## 2022-12-15 RX ADMIN — OLANZAPINE 10 MG: 10 INJECTION, POWDER, FOR SOLUTION INTRAMUSCULAR at 09:05

## 2022-12-15 RX ADMIN — SODIUM CHLORIDE 1000 ML: 0.9 INJECTION, SOLUTION INTRAVENOUS at 10:41

## 2022-12-15 RX ADMIN — IODIXANOL 100 ML: 320 INJECTION, SOLUTION INTRAVASCULAR at 13:04

## 2022-12-15 RX ADMIN — MORPHINE SULFATE 2 MG: 2 INJECTION, SOLUTION INTRAMUSCULAR; INTRAVENOUS at 11:15

## 2022-12-15 RX ADMIN — HYDRALAZINE HYDROCHLORIDE 5 MG: 20 INJECTION, SOLUTION INTRAMUSCULAR; INTRAVENOUS at 12:02

## 2022-12-15 RX ADMIN — FAMOTIDINE 20 MG: 10 INJECTION, SOLUTION INTRAVENOUS at 09:01

## 2022-12-15 RX ADMIN — ONDANSETRON 4 MG: 2 INJECTION INTRAMUSCULAR; INTRAVENOUS at 16:02

## 2022-12-15 RX ADMIN — SODIUM CHLORIDE 125 ML/HR: 0.9 INJECTION, SOLUTION INTRAVENOUS at 15:59

## 2022-12-15 RX ADMIN — BUDESONIDE AND FORMOTEROL FUMARATE DIHYDRATE 2 PUFF: 80; 4.5 AEROSOL RESPIRATORY (INHALATION) at 17:37

## 2022-12-15 RX ADMIN — PANTOPRAZOLE SODIUM 40 MG: 40 INJECTION, POWDER, FOR SOLUTION INTRAVENOUS at 21:00

## 2022-12-15 RX ADMIN — WATER 2.1 ML: 1 INJECTION INTRAMUSCULAR; INTRAVENOUS; SUBCUTANEOUS at 09:05

## 2022-12-15 RX ADMIN — OXYCODONE HYDROCHLORIDE 20 MG: 10 TABLET ORAL at 19:16

## 2022-12-15 RX ADMIN — ONDANSETRON 4 MG: 2 INJECTION INTRAMUSCULAR; INTRAVENOUS at 09:01

## 2022-12-15 RX ADMIN — WATER 2.1 ML: 1000 INJECTION, SOLUTION INTRAVENOUS at 09:05

## 2022-12-15 RX ADMIN — HYDROMORPHONE HYDROCHLORIDE 0.2 MG: 0.2 INJECTION, SOLUTION INTRAMUSCULAR; INTRAVENOUS; SUBCUTANEOUS at 16:02

## 2022-12-15 RX ADMIN — PREGABALIN 100 MG: 50 CAPSULE ORAL at 21:00

## 2022-12-15 RX ADMIN — HYDROMORPHONE HYDROCHLORIDE 0.5 MG: 1 INJECTION, SOLUTION INTRAMUSCULAR; INTRAVENOUS; SUBCUTANEOUS at 13:43

## 2022-12-15 NOTE — ASSESSMENT & PLAN NOTE
Lab Results   Component Value Date    EGFR 32 12/15/2022    EGFR 32 10/17/2022    EGFR 31 10/17/2022    CREATININE 1 77 (H) 12/15/2022    CREATININE 1 78 (H) 10/17/2022    CREATININE 1 81 (H) 10/17/2022     · POA creatinine 1 77, which appears to be approximately the patients baseline  · Continue to monitor for signs of MARY in the setting of N/V, dehydration  · Daily weights, I/O  · Continue NSS ay 125cc/hour

## 2022-12-15 NOTE — ED PROVIDER NOTES
History  Chief Complaint   Patient presents with   • Vomiting     Pt arrives via EMS vomiting since this morning  Thrashing around on stretcher spitting into emesis bag  Has not taken anything for her sx      47 YO female presents with nausea and vomiting  States this has been present for the last few days, others at home have had similar symptoms  She notes previous issues with similar in the past  She has generalized abdominal pain, has no been able to keep anything down so called EMS  EMS crew states patient had no distress en route to the hospital but, on being put onto the hospital stretcher rapidly appeared to become uncomfortable with vociferous dry heaving  Pt denies CP/SOB/F/C/D/C, no dysuria, burning on urination or blood in urine  History provided by:  Patient and EMS personnel   used: No    Vomiting  Severity:  Moderate  Timing:  Intermittent  Quality:  Stomach contents  Progression:  Worsening  Chronicity:  Recurrent  Relieved by:  Nothing  Worsened by:  Nothing  Ineffective treatments:  None tried  Associated symptoms: abdominal pain    Associated symptoms: no arthralgias, no chills, no diarrhea and no fever        Prior to Admission Medications   Prescriptions Last Dose Informant Patient Reported? Taking? Alcohol Swabs (Alcohol Pads) 70 % PADS   No No   Sig: Use 4 (four) times a day   Blood Glucose Monitoring Suppl (FreeStyle Lite) DEIRDRE   No No   Sig: Use daily   Blood Pressure Monitor KIT   No No   Sig: Use daily to check blood pressure     Cholecalciferol (Vitamin D) 50 MCG (2000 UT) tablet   No No   Sig: Take 1 tablet (2,000 Units total) by mouth daily   Comfort Touch Insulin Pen Need 33G X 6 MM MISC   No No   Sig: USE TO INJECT INSULIN FOUR TIMES A DAY ( WITH MEALS AND AT BEDTIME )   Diclofenac Sodium (VOLTAREN) 1 %   No No   Sig: APPLY 2GM TOPICALLY FOUR TIMES A DAY   Easy Comfort Lancets MISC   No No   Sig: USE TO TEST THE BLOOD SUGAR THREE TIMES A DAY   Deanna Valdivia MCG CAPS   No No   Sig: TAKE ONE CAPSULE BY MOUTH ONCE DAILY   NON FORMULARY   Yes No   Sig: [facility]   NovoLOG FlexPen 100 units/mL injection pen   No No   Sig: INJECT 6 UNITS UNDER THE SKIN THREE TIMES A DAY WITH MEALS   Restasis 0 05 % ophthalmic emulsion   Yes No   Symbicort 80-4 5 MCG/ACT inhaler   No No   Sig: INHALE 2 PUFFS BY MOUTH TWICE A DAY RINSE MOUTH AFTER USE   TiZANidine (ZANAFLEX) 4 MG capsule   No No   Sig: Take 1 capsule (4 mg total) by mouth 3 (three) times a day   acetaminophen (TYLENOL) 500 mg tablet   No No   Sig: Take 2 tablets (1,000 mg total) by mouth every 8 (eight) hours as needed for mild pain   al mag oxide-diphenhydramine-lidocaine viscous (MAGIC MOUTHWASH) 1:1:1 suspension   No No   Sig: Swish and spit 10 mL every 4 (four) hours as needed for mouth pain or discomfort   albuterol (2 5 mg/3 mL) 0 083 % nebulizer solution   No No   Sig: USE 1 VIAL VIA NEBULIZER EVERY 6 HOURS AS NEEDED FOR WHEEZING OR SHORTNESS OF BREATH   albuterol (PROVENTIL HFA,VENTOLIN HFA) 90 mcg/act inhaler   No No   Sig: INHALE 2 PUFFS BY MOUTH EVERY 6 HOURS AS NEEDED FOR WHEEZING OR SHORTNESS OF BREATH   amphetamine-dextroamphetamine (ADDERALL XR) 20 MG 24 hr capsule   No No   Sig: Take 1 capsule (20 mg total) by mouth 2 (two) times a day Max Daily Amount: 40 mg   buPROPion (WELLBUTRIN XL) 300 mg 24 hr tablet   No No   Sig: TAKE ONE TABLET BY MOUTH EVERY MORNING   calcium carbonate (OS-ALISIA) 600 MG tablet   No No   Sig: Take 1 tablet (600 mg total) by mouth daily   clobetasol (TEMOVATE) 0 05 % cream   No No   Sig: APPLY TOPICALLY TO AFFECTED AREA TWICE A DAY   dicyclomine (BENTYL) 20 mg tablet   No No   Sig: Take 1 tablet (20 mg total) by mouth in the morning and 1 tablet (20 mg total) in the evening  Do all this for 7 days     glucose blood (FREESTYLE LITE) test strip   No No   Sig: USE TO TEST THE BLOOD SUGAR THREE TIMES A DAY AS DIRECTED   haloperidol (HALDOL) 5 mg tablet   No No   Sig: TAKE ONE TABLET BY MOUTH ONCE DAILY AS NEEDED FOR ABDOMINAL PAIN / NAUSEA   hydrocortisone 2 5 % cream   No No   Sig: Apply topically 2 (two) times a day as needed for rash   insulin glargine (Toujeo Max SoloStar) 300 units/mL CONCENTRATED U-300 injection pen (2-unit dial)   No No   Sig: Inject 22 Units under the skin daily   lidocaine (LMX) 4 % cream   No No   Sig: Apply topically as needed for mild pain   lidocaine (XYLOCAINE) 5 % ointment   No No   Sig: APPLY TOPICALLY 2GM TWICE A DAY TO AFFECTED AREAS AS NEEDED FOR MILD PAIN   Patient taking differently: Lidocaine patches   naloxone (Narcan) 4 mg/0 1 mL nasal spray   No No   Sig: ADMINISTER 1 SPRAY IN ONE NOSTRIL IF NO REPONSE AFTER 2-3 MINUTES SPRAY INTO OTHER NOSTRIL WITH NEW spray   nystatin-triamcinolone (MYCOLOG-II) ointment   No No   Sig: Apply topically 2 (two) times a day   ondansetron (Zofran ODT) 8 mg disintegrating tablet   No No   Sig: Take 1 tablet (8 mg total) by mouth every 8 (eight) hours as needed for nausea or vomiting   oxyCODONE (Roxicodone) 15 mg immediate release tablet   No No   Sig: Take 1 tablet (15 mg total) by mouth every 4 (four) hours as needed for moderate pain Max Daily Amount: 90 mg   pantoprazole (PROTONIX) 40 mg tablet   No No   Sig: Take 1 tablet (40 mg total) by mouth 2 (two) times a day before meals   pregabalin (LYRICA) 100 mg capsule   No No   Sig: Take 1 capsule (100 mg total) by mouth 3 (three) times a day   pregabalin (LYRICA) 25 mg capsule   No No   Sig: Take 1 capsule (25 mg total) by mouth 2 (two) times a day   promethazine (PHENERGAN) 25 mg tablet   No No   Sig: Take 1 tablet (25 mg total) by mouth every 6 (six) hours as needed for nausea or vomiting   scopolamine (TRANSDERM-SCOP) 1 5 mg/3 days TD 72 hr patch   No No   Sig: Place 1 patch on the skin every third day   sucralfate (CARAFATE) 1 g/10 mL suspension   No No   Sig: TAKE 10ML BY MOUTH THREE TIMES A DAY   Patient not taking: Reported on 8/4/2022   sucralfate (CARAFATE) 1 g/10 mL suspension   No No   Sig: Take 10 mL (1 g total) by mouth in the morning and 10 mL (1 g total) at noon and 10 mL (1 g total) in the evening and 10 mL (1 g total) before bedtime  Do all this for 7 days  Patient not taking: Reported on 8/4/2022      Facility-Administered Medications: None       Past Medical History:   Diagnosis Date   • ADHD    • Anemia of chronic disease    • Anxiety    • Arthritis ? • Asthma    • Bipolar disorder ([place] [address] )    • Borderline personality disorder ([place] [address] )    • Cataplexy    • Chronic abdominal pain    • Chronic kidney disease ? • CKD (chronic kidney disease) stage 3, GFR 30-59 ml/min ([facility])    • Cushing syndrome ([facility])    • Depression ? • Diabetes mellitus ([place] [address] )    • DVT (deep venous thrombosis) ([facility])    • GERD (gastroesophageal reflux disease)    • Headache(784 0) 3 months   • History of acute pancreatitis     felt secondary to Bactrim   • History of transfusion    • Hypertension    • Liver disease     fatty liver   • Microscopic polyangiitis ([facility])    • Ovarian cyst    • PTSD (post-traumatic stress disorder)    • Self-inflicted injury     self inflicted skin wounds   • Sleep apnea    • Wegener's granulomatosis with renal involvement ([place] [address] ) 2015       Past Surgical History:   Procedure Laterality Date   • ESOPHAGOGASTRODUODENOSCOPY  09/11/2015    mild antral gastritis   • GASTRIC STIMULATOR IMPLANT SURGERY  06/25/2020   • MA COLONOSCOPY FLX DX W/COLLJ SPEC WHEN PFRMD N/A 12/14/2018    adenoma removed from the transverse, hyperplastic polyp removed from the left colon   • MA ESOPHAGOGASTRODUODENOSCOPY TRANSORAL DIAGNOSTIC N/A 12/14/2018    gastritis and scant coffee-ground material   Biopsies negative for H  pylori   • MA OPEN TX RADIAL & ULNAR SHAFT FX FIX RADIUS AND ULNA Left 6/23/2022    Procedure: OPEN REDUCTION W/ INTERNAL FIXATION (ORIF) RADIUS / ULNA ([facility]);   Surgeon: Gab Allred MD;  Location: [facility];  Service: Orthopedics   • RELEASE SCAR CONTRACTURE / GRAFT REPAIRS OF HAND Bilateral    • UPPER GASTROINTESTINAL ENDOSCOPY  2019    Dr Prema Arguetaox to the pylorus       Family History   Problem Relation Age of Onset   • Arthritis Mother    • Depression Mother    • Diabetes Mother    • Mental illness Mother    • Migraines Mother    • No Known Problems Father    • Colon cancer Neg Hx    • Drug abuse Neg Hx         mother father   • Mental illness Neg Hx         disorder, mother father   • Cancer Neg Hx    • Breast cancer Neg Hx      I have reviewed and agree with the history as documented  E-Cigarette/Vaping   • E-Cigarette Use Never User      E-Cigarette/Vaping Substances   • Nicotine No    • THC No    • CBD No    • Flavoring No    • Other No    • Unknown No      Social History     Tobacco Use   • Smoking status: Former     Packs/day:      Years: 10 00     Pack years: 10 00     Types: Cigarettes     Quit date: 2011     Years since quittin 9   • Smokeless tobacco: Never   • Tobacco comments:     Stopped smoking 11 years ago   Vaping Use   • Vaping Use: Never used   Substance Use Topics   • Alcohol use: Never   • Drug use: Yes     Types: Marijuana     Comment: marijuana daily       Review of Systems   Constitutional: Negative for chills, fatigue and fever  HENT: Negative for dental problem  Eyes: Negative for visual disturbance  Respiratory: Negative for shortness of breath  Cardiovascular: Negative for chest pain  Gastrointestinal: Positive for abdominal pain and vomiting  Negative for diarrhea  Genitourinary: Negative for dysuria and frequency  Musculoskeletal: Negative for arthralgias  Skin: Negative for rash  Neurological: Negative for dizziness, weakness and light-headedness  Psychiatric/Behavioral: Negative for agitation, behavioral problems and confusion  All other systems reviewed and are negative  Physical Exam  Physical Exam  Vitals and nursing note reviewed     Constitutional:       Comments: Uncomfortable   HENT:      Head: Normocephalic and atraumatic  Eyes:      Extraocular Movements: Extraocular movements intact  Conjunctiva/sclera: Conjunctivae normal    Cardiovascular:      Rate and Rhythm: Normal rate  Pulmonary:      Effort: Pulmonary effort is normal    Abdominal:      General: There is no distension  Tenderness: There is abdominal tenderness  Musculoskeletal:         General: Normal range of motion  Cervical back: Normal range of motion  Skin:     Findings: No rash  Neurological:      General: No focal deficit present  Mental Status: She is alert  Cranial Nerves: No cranial nerve deficit     Psychiatric:         Mood and Affect: Mood normal          Vital Signs  ED Triage Vitals   Temperature Pulse Respirations Blood Pressure SpO2   12/15/22 0900 12/15/22 0900 12/15/22 0900 12/15/22 0900 12/15/22 0900   98 1 °F (36 7 °C) 88 16 (!) 203/113 100 %      Temp Source Heart Rate Source Patient Position - Orthostatic VS BP Location FiO2 (%)   12/15/22 0900 12/15/22 1137 12/15/22 1137 12/15/22 1137 --   Oral Monitor Lying Left arm       Pain Score       12/15/22 0900       10 - Worst Possible Pain           Vitals:    12/15/22 1245 12/15/22 1345 12/15/22 1415 12/15/22 1538   BP: (!) 191/99 (!) 188/88 (!) 191/84 164/81   Pulse: 58 66 (!) 50 (!) 53   Patient Position - Orthostatic VS: Lying Lying Lying Sitting         Visual Acuity      ED Medications  Medications   albuterol (PROVENTIL HFA,VENTOLIN HFA) inhaler 2 puff (has no administration in time range)   amphetamine-dextroamphetamine (ADDERALL) tablet 20 mg (has no administration in time range)   buPROPion (WELLBUTRIN XL) 24 hr tablet 300 mg (0 mg Oral Hold 12/15/22 1605)   haloperidol (HALDOL) tablet 5 mg (has no administration in time range)   insulin glargine (LANTUS) subcutaneous injection 22 Units 0 22 mL (has no administration in time range)   lidocaine (LIDODERM) 5 % patch 1 patch (has no administration in time range)   linaCLOtide CAPS 1 capsule ( Oral Canceled Entry 12/15/22 1607)   insulin lispro (HumaLOG) 100 units/mL subcutaneous injection 6 Units (0 Units Subcutaneous Hold 12/15/22 1606)   oxyCODONE (ROXICODONE) IR tablet 15 mg (has no administration in time range)   pregabalin (LYRICA) capsule 100 mg (0 mg Oral Hold 12/15/22 1606)   pregabalin (LYRICA) capsule 25 mg (0 mg Oral Hold 12/15/22 1711)   budesonide-formoterol (SYMBICORT) 80-4 5 MCG/ACT inhaler 2 puff (has no administration in time range)   TiZANidine (ZANAFLEX) capsule 4 mg ( Oral Canceled Entry 12/15/22 1607)   sodium chloride 0 9 % infusion (125 mL/hr Intravenous New Bag 12/15/22 1559)   acetaminophen (TYLENOL) tablet 650 mg (has no administration in time range)   HYDROmorphone HCl (DILAUDID) injection 0 2 mg (0 2 mg Intravenous Given 12/15/22 1602)   ondansetron (ZOFRAN) injection 4 mg (4 mg Intravenous Given 12/15/22 1602)   docusate sodium (COLACE) capsule 100 mg (0 mg Oral Hold 12/15/22 1711)   oxyCODONE (ROXICODONE) immediate release tablet 20 mg (has no administration in time range)   hydrALAZINE (APRESOLINE) injection 10 mg (has no administration in time range)   metoclopramide (REGLAN) injection 10 mg (has no administration in time range)   pantoprazole (PROTONIX) injection 40 mg (has no administration in time range)   insulin lispro (HumaLOG) 100 units/mL subcutaneous injection 1-5 Units (0 Units Subcutaneous Hold 12/15/22 1607)   insulin lispro (HumaLOG) 100 units/mL subcutaneous injection 1-5 Units (has no administration in time range)   OLANZapine (ZyPREXA) IM injection 10 mg (10 mg Intramuscular Given 12/15/22 0905)   ondansetron (ZOFRAN) injection 4 mg (4 mg Intravenous Given 12/15/22 0901)   Famotidine (PF) (PEPCID) injection 20 mg (20 mg Intravenous Given 12/15/22 0901)   sterile water injection 2 1 mL (2 1 mL Injection Given 12/15/22 6206)   sodium chloride 0 9 % bolus 1,000 mL (1,000 mL Intravenous New Bag 12/15/22 1664)   morphine injection 2 mg (2 mg Intravenous Given 12/15/22 1115)   metoclopramide (REGLAN) injection 10 mg (10 mg Intravenous Given 12/15/22 1116)   hydrALAZINE (APRESOLINE) injection 5 mg (5 mg Intravenous Given 12/15/22 1202)   iodixanol (VISIPAQUE) 320 MG/ML injection 100 mL (100 mL Intravenous Given 12/15/22 1304)   HYDROmorphone (DILAUDID) injection 0 5 mg (0 5 mg Intravenous Given 12/15/22 1343)       Diagnostic Studies  Results Reviewed     Procedure Component Value Units Date/Time    Urine Microscopic [[id number]]  (Abnormal) Collected: 12/15/22 1222    Lab Status: Final result Specimen: Urine, Clean Catch Updated: 12/15/22 1243     RBC, UA 1-2 /hpf      WBC, UA 0-1 /hpf      Epithelial Cells Occasional /hpf      Bacteria, UA Occasional /hpf     Urine Macroscopic, POC [[id number]]  (Abnormal) Collected: 12/15/22 1222    Lab Status: Final result Specimen: Urine Updated: 12/15/22 1224     Color, UA Yellow     Clarity, UA Clear     pH, UA 5 5     Leukocytes, UA Negative     Nitrite, UA Negative     Protein, UA >=300 mg/dl      Glucose, UA Negative mg/dl      Ketones, UA Trace mg/dl      Urobilinogen, UA 0 2 E U /dl      Bilirubin, UA Negative     Occult Blood, UA Small     Specific [place], UA >=1 030    Narrative:      CLINITEK RESULT    Basic metabolic panel [[id number]]  (Abnormal) Collected: 12/15/22 0901    Lab Status: Final result Specimen: Blood from Arm, Right Updated: 12/15/22 1021     Sodium 142 mmol/L      Potassium 3 7 mmol/L      Chloride 108 mmol/L      CO2 20 mmol/L      ANION GAP 14 mmol/L      BUN 36 mg/dL      Creatinine 1 77 mg/dL      Glucose 132 mg/dL      Calcium 9 9 mg/dL      eGFR 32 ml/min/1 73sq m     Narrative:      [place] guidelines for Chronic Kidney Disease (CKD):   •  Stage 1 with normal or high GFR (GFR > 90 mL/min/1 73 square meters)  •  Stage 2 Mild CKD (GFR = 60-89 mL/min/1 73 square meters)  •  Stage 3A Moderate CKD (GFR = 45-59 mL/min/1 73 square meters)  •  Stage 3B Moderate CKD (GFR = 30-44 mL/min/1 73 square meters)  •  Stage 4 Severe CKD (GFR = 15-29 mL/min/1 73 square meters)  •  Stage 5 End Stage CKD (GFR <15 mL/min/1 73 square meters)  Note: GFR calculation is accurate only with a steady state creatinine    Hepatic function panel [[id number]]  (Abnormal) Collected: 12/15/22 0901    Lab Status: Final result Specimen: Blood from Arm, Right Updated: 12/15/22 1021     Total Bilirubin 0 36 mg/dL      Bilirubin, Direct 0 15 mg/dL      Alkaline Phosphatase 124 U/L      AST 24 U/L      ALT 24 U/L      Total Protein 8 7 g/dL      Albumin 4 2 g/dL     Lipase [[id number]]  (Abnormal) Collected: 12/15/22 0901    Lab Status: Final result Specimen: Blood from Arm, Right Updated: 12/15/22 1021     Lipase 989 u/L     CBC and differential [[id number]]  (Abnormal) Collected: 12/15/22 0901    Lab Status: Final result Specimen: Blood from Arm, Right Updated: 12/15/22 0912     WBC 11 69 Thousand/uL      RBC 4 41 Million/uL      Hemoglobin 13 8 g/dL      Hematocrit 40 7 %      MCV 92 fL      MCH 31 3 pg      MCHC 33 9 g/dL      RDW 12 9 %      MPV 10 4 fL      Platelets 356 Thousands/uL      nRBC 0 /100 WBCs      Neutrophils Relative 86 %      Immat GRANS % 1 %      Lymphocytes Relative 3 %      Monocytes Relative 10 %      Eosinophils Relative 0 %      Basophils Relative 0 %      Neutrophils Absolute 10 07 Thousands/µL      Immature Grans Absolute 0 06 Thousand/uL      Lymphocytes Absolute 0 38 Thousands/µL      Monocytes Absolute 1 16 Thousand/µL      Eosinophils Absolute 0 01 Thousand/µL      Basophils Absolute 0 01 Thousands/µL                  CT abdomen pelvis with contrast   Final Result by Nyla Ramires MD (12/15 1412)      No acute abnormality in the abdomen or pelvis              Workstation performed: [id number]         FL spine and pain procedure    (Results Pending)   US right upper quadrant    (Results Pending)              Procedures  Procedures         ED Course SBIRT 20yo+    Flowsheet Row Most Recent Value   SBIRT (23 yo +)    In order to provide better care to our patients, we are screening all of our patients for alcohol and drug use  Would it be okay to ask you these screening questions? No Filed at: 12/15/2022 0954                    [facility]  Number of Diagnoses or Management Options  Pancreatitis: new and requires workup  Diagnosis management comments: 1  Nausea with vomiting - Patient with similar in the past  Will check CBC for leukocytosis, metabolic panel for electrolyte abnormalities and dehydration,  LFT's to assess GB dysfunction, lipase for pancreatitis, give Zyprexa, Zofran, pepcid  Amount and/or Complexity of Data Reviewed  Clinical lab tests: ordered and reviewed  Tests in the radiology section of CPT®: ordered and reviewed  Obtain history from someone other than the patient: yes  Discuss the patient with other providers: yes  Independent visualization of images, tracings, or specimens: yes    Patient Progress  Patient progress: stable      Disposition  Final diagnoses:   Pancreatitis     Time reflects when diagnosis was documented in both MDM as applicable and the Disposition within this note     Time User Action Codes Description Comment    12/15/2022 11:49 AM Esau Delvalle Add [M46 1] Bilateral sacroiliitis ([address] )     12/15/2022  2:24 PM Manoj CURTIS Add [K85 90] Pancreatitis     12/15/2022  3:39 PM Jose Officer Add [K31 84] Gastroparesis       ED Disposition     ED Disposition   Admit    Condition   Stable    Date/Time   u Dec 15, 2022  2:24 PM    Comment   Case was discussed with HAJA and the patient's admission status was agreed to be Admission Status: inpatient status to the service of Dr Wood Solid              Follow-up Information    None         Current Discharge Medication List      CONTINUE these medications which have NOT CHANGED    Details   acetaminophen (TYLENOL) 500 mg tablet Take 2 tablets (1,000 mg total) by mouth every 8 (eight) hours as needed for mild pain  Qty: 60 tablet, Refills: 1    Associated Diagnoses: Acute non intractable tension-type headache      al mag oxide-diphenhydramine-lidocaine viscous (MAGIC MOUTHWASH) 1:1:1 suspension Swish and spit 10 mL every 4 (four) hours as needed for mouth pain or discomfort  Qty: 90 mL, Refills: 2    Associated Diagnoses: Mouth sores      albuterol (2 5 mg/3 mL) 0 083 % nebulizer solution USE 1 VIAL VIA NEBULIZER EVERY 6 HOURS AS NEEDED FOR WHEEZING OR SHORTNESS OF BREATH  Qty: 90 mL, Refills: 0    Associated Diagnoses: Mild persistent asthma without complication      albuterol (PROVENTIL HFA,VENTOLIN HFA) 90 mcg/act inhaler INHALE 2 PUFFS BY MOUTH EVERY 6 HOURS AS NEEDED FOR WHEEZING OR SHORTNESS OF BREATH  Qty: 18 g, Refills: 1    Associated Diagnoses: Mild persistent asthma without complication      Alcohol Swabs (Alcohol Pads) 70 % PADS Use 4 (four) times a day  Qty: 400 each, Refills: 2    Associated Diagnoses: Type 2 diabetes mellitus with stage 4 chronic kidney disease, unspecified whether long term insulin use ([facility])      amphetamine-dextroamphetamine (ADDERALL XR) 20 MG 24 hr capsule Take 1 capsule (20 mg total) by mouth 2 (two) times a day Max Daily Amount: 40 mg  Qty: 60 capsule, Refills: 0    Comments: Continuation of therapy  Associated Diagnoses: Bipolar 1 disorder ([facility])      Blood Glucose Monitoring Suppl (FreeStyle Lite) DEIRDRE Use daily  Qty: 1 each, Refills: 0    Associated Diagnoses: Type 2 diabetes mellitus with stage 4 chronic kidney disease, unspecified whether long term insulin use ([facility])      Blood Pressure Monitor KIT Use daily to check blood pressure    Qty: 1 kit, Refills: 0    Associated Diagnoses: Benign essential HTN      buPROPion (WELLBUTRIN XL) 300 mg 24 hr tablet TAKE ONE TABLET BY MOUTH EVERY MORNING  Qty: 30 tablet, Refills: 1    Associated Diagnoses: Depression, unspecified depression type      calcium carbonate (OS-ALISIA) 600 MG tablet Take 1 tablet (600 mg total) by mouth daily  Qty: 90 tablet, Refills: 1    Associated Diagnoses: Osteopenia, unspecified location      Cholecalciferol (Vitamin D) 50 MCG (2000 UT) tablet Take 1 tablet (2,000 Units total) by mouth daily  Qty: 90 tablet, Refills: 1    Associated Diagnoses: Osteopenia, unspecified location      clobetasol (TEMOVATE) 0 05 % cream APPLY TOPICALLY TO AFFECTED AREA TWICE A DAY  Qty: 60 g, Refills: 0    Associated Diagnoses: Rash      Comfort Touch Insulin Pen Need 33G X 6 MM MISC USE TO INJECT INSULIN FOUR TIMES A DAY ( WITH MEALS AND AT BEDTIME )  Qty: 100 each, Refills: 10    Associated Diagnoses: Type 2 diabetes mellitus with stage 3 chronic kidney disease, with long-term current use of insulin, unspecified whether stage 3a or 3b CKD ([facility])      Diclofenac Sodium (VOLTAREN) 1 % APPLY 2GM TOPICALLY FOUR TIMES A DAY  Qty: 200 g, Refills: 2    Associated Diagnoses: Pain in finger of left hand      dicyclomine (BENTYL) 20 mg tablet Take 1 tablet (20 mg total) by mouth in the morning and 1 tablet (20 mg total) in the evening  Do all this for 7 days  Qty: 20 tablet, Refills: 0    Associated Diagnoses: Acute abdominal pain      Easy Comfort Lancets MISC USE TO TEST THE BLOOD SUGAR THREE TIMES A DAY  Qty: 100 each, Refills: 6    Associated Diagnoses: Type 2 diabetes mellitus with stage 4 chronic kidney disease, unspecified whether long term insulin use ([facility])      glucose blood (FREESTYLE LITE) test strip USE TO TEST THE BLOOD SUGAR THREE TIMES A DAY AS DIRECTED  Qty: 100 strip, Refills: 10    Associated Diagnoses: Type 2 diabetes mellitus without complication, with long-term current use of insulin ([facility])      haloperidol (HALDOL) 5 mg tablet TAKE ONE TABLET BY MOUTH ONCE DAILY AS NEEDED FOR ABDOMINAL PAIN / NAUSEA  Qty: 10 tablet, Refills: 0    Associated Diagnoses: Nausea;  Generalized abdominal pain      hydrocortisone 2 5 % cream Apply topically 2 (two) times a day as needed for rash  Qty: 30 g, Refills: 1    Associated Diagnoses: Rash      insulin glargine (Toujeo Max SoloStar) 300 units/mL CONCENTRATED U-300 injection pen (2-unit dial) Inject 22 Units under the skin daily  Qty: 9 mL, Refills: 1    Associated Diagnoses: Type 2 diabetes mellitus without complication, with long-term current use of insulin ([facility])      lidocaine (LMX) 4 % cream Apply topically as needed for mild pain  Qty: 30 g, Refills: 0    Associated Diagnoses: Back pain      lidocaine (XYLOCAINE) 5 % ointment APPLY TOPICALLY 2GM TWICE A DAY TO AFFECTED AREAS AS NEEDED FOR MILD PAIN  Qty: 250 g, Refills: 8    Associated Diagnoses: Chronic pain syndrome      Linzess 72 MCG CAPS TAKE ONE CAPSULE BY MOUTH ONCE DAILY  Qty: 90 capsule, Refills: 10    Associated Diagnoses: Constipation, unspecified constipation type      naloxone (Narcan) 4 mg/0 1 mL nasal spray ADMINISTER 1 SPRAY IN ONE NOSTRIL IF NO REPONSE AFTER 2-3 MINUTES SPRAY INTO OTHER NOSTRIL WITH NEW spray  Qty: 2 each, Refills: 2    Associated Diagnoses: Chronic pain syndrome      [facility]      NovoLOG FlexPen 100 units/mL injection pen INJECT 6 UNITS UNDER THE SKIN THREE TIMES A DAY WITH MEALS  Qty: 15 mL, Refills: 1    Associated Diagnoses: Type 2 diabetes mellitus without complication, with long-term current use of insulin ([facility])      nystatin-triamcinolone (MYCOLOG-II) ointment Apply topically 2 (two) times a day  Qty: 30 g, Refills: 1    Associated Diagnoses: Rash      ondansetron (Zofran ODT) 8 mg disintegrating tablet Take 1 tablet (8 mg total) by mouth every 8 (eight) hours as needed for nausea or vomiting  Qty: 30 tablet, Refills: 2    Associated Diagnoses: Nausea      oxyCODONE (Roxicodone) 15 mg immediate release tablet Take 1 tablet (15 mg total) by mouth every 4 (four) hours as needed for moderate pain Max Daily Amount: 90 mg  Qty: 180 tablet, Refills: 0    Comments: Please d/c 10 mg tablet    Associated Diagnoses: Granulomatosis with polyangiitis with renal involvement ([facility] [address] ); Chronic pain syndrome      pantoprazole (PROTONIX) 40 mg tablet Take 1 tablet (40 mg total) by mouth 2 (two) times a day before meals  Qty: 180 tablet, Refills: 1    Associated Diagnoses: Gastroesophageal reflux disease without esophagitis      ! ! pregabalin (LYRICA) 100 mg capsule Take 1 capsule (100 mg total) by mouth 3 (three) times a day  Qty: 90 capsule, Refills: 1    Associated Diagnoses: Chronic pain syndrome      !! pregabalin (LYRICA) 25 mg capsule Take 1 capsule (25 mg total) by mouth 2 (two) times a day  Qty: 60 capsule, Refills: 2    Associated Diagnoses: Cervical radiculopathy      promethazine (PHENERGAN) 25 mg tablet Take 1 tablet (25 mg total) by mouth every 6 (six) hours as needed for nausea or vomiting  Qty: 60 tablet, Refills: 3    Associated Diagnoses: Nausea and vomiting      Restasis 0 05 % ophthalmic emulsion       scopolamine (TRANSDERM-SCOP) 1 5 mg/3 days TD 72 hr patch Place 1 patch on the skin every third day  Qty: 10 patch, Refills: 0    Associated Diagnoses: Pyelonephritis      sucralfate (CARAFATE) 1 g/10 mL suspension TAKE 10ML BY MOUTH THREE TIMES A DAY  Qty: 500 mL, Refills: 2    Associated Diagnoses: Gastroesophageal reflux disease, unspecified whether esophagitis present      Symbicort 80-4 5 MCG/ACT inhaler INHALE 2 PUFFS BY MOUTH TWICE A DAY RINSE MOUTH AFTER USE  Qty: 10 2 g, Refills: 2    Associated Diagnoses: Mild persistent asthma without complication      TiZANidine (ZANAFLEX) 4 MG capsule Take 1 capsule (4 mg total) by mouth 3 (three) times a day  Qty: 90 capsule, Refills: 2    Associated Diagnoses: Cervical radiculopathy; Lumbar spondylosis       ! ! - Potential duplicate medications found  Please discuss with provider  No discharge procedures on file      PDMP Review       Value Time User    PDMP Reviewed  Yes 12/15/2022  2:33 PM Helen Haines PA-C          ED Provider  Electronically Signed by           Ciaran Sloan MD  12/15/22 [facility]

## 2022-12-15 NOTE — ED NOTES
Per provider, patient able to be sent upstairs with increased blood pressure        John Pradhan RN  12/15/22 1738

## 2022-12-15 NOTE — CASE MANAGEMENT
Case Management ED Assessment    Patient name Rajani Escoto ED 17/ED 17 MRN [id number]  : 1970 Date 12/15/2022        OBJECTIVE:  Predictive Model Details         95% Factor Value    Risk of Hospital Admission or ED Visit Model Number of ED Visits 5+     Has Medicaid Yes     Number of Hospitalizations 2     Is in Relationship No     Has Chronic Kidney Disease Yes     Has Anemia Yes     Has Depression Yes     Has Diabetes Yes     Has Asthma Yes     Has PCP Yes            Chief Complaint: Vomiting   Patient Class: Emergency  Preferred Pharmacy:   [facility] [address] [place] [address]  [address]  Solo Leung   [address]  [id number]  Phone: [phone] Fax: [phone]    Primary Care Provider: Teri Mcneil PA-C    Primary Insurance: Yessica Fraire MEDICARE [facility]  Secondary Insurance: [address] [place] [address]    ED ASSESSMENT:  Adilson Jc Proxies    There are no active Health Care Proxies on file  Readmission Root Cause  30 Day Readmission: No    Outpatient Care Information  Have you seen a doctor in the last year?: Yes  Specify which physician group(s) you have seen in the past year : [address] services used in the past year : PCP    Prescribed Medications Prior to Admission/ED Visit  Has patient been prescribed medications (prior to this ED visit/admission)?: Yes  Any difficulty obtaining medications?: No  Has the patient been taking all prescribed medication(s)?: Yes  Does the patient have difficulty affording medication(s)?: No    Patient Information  Admitted from[de-identified] Home  Mental Status: Alert  During Assessment patient was accompanied by: Not accompanied during assessment  Primary Caregiver: Self  Support Systems: Home care staff, Friend (8 hours daily/5 days per week of non medical home care)  [place] of Residence: [address] [place] [address] do you live in?: [address] [place] [address] entry access options   Select all that apply : No steps to enter home  Type of Current Residence: Apartment  Floor Level: 1  Upon entering residence, is there a bedroom on the main floor (no further steps)?: Yes  Upon entering residence, is there a bathroom on the main floor (no further steps)?: Yes  In the last 12 months, was there a time when you were not able to pay the mortgage or rent on time?: No  In the last 12 months, how many places have you lived?: 1  In the last 12 months, was there a time when you did not have a steady place to sleep or slept in a shelter (including now)?: No  Living Arrangements: Lives Alone  Is patient a ?: No    Patient Information Continued  Income Source: SSI/SSD  Does patient have prescription coverage?: Yes  Within the past 12 months, you worried that your food would run out before you got the money to buy more : Never true  Within the past 12 months, the food you bought just didn't last and you didn't have money to get more : Never true  Does patient receive dialysis treatments?: No  Does patient have a history of substance abuse?: No  Does patient have a history of Mental Health Diagnosis?: Yes  Is patient receiving treatment for mental health?: No  Patient declined treatment information  Has patient received inpatient treatment related to mental health in the last 2 years?: No    Food and Transportation  What is patient's usual means of transportation?: Family Transport  Ask patient:  How would you describe your eating habits?: Good

## 2022-12-15 NOTE — ASSESSMENT & PLAN NOTE
· Chronic, patient recently received steroid injections 12/13/2022  · Followed outpatient by orthopedics and pain management

## 2022-12-15 NOTE — ASSESSMENT & PLAN NOTE
· Patient presented to the ED with N/V x 3 days and severe epigastric pain  · Patient family medicine doctor reported, repeated ER visits for severe N/V and abdominal pain  · Reports that each time the patient was prescribed 5 mg Haldol which relieved these symptoms  Was provided with this medication outpatient to help prevent ER visits    · Zyprexa injection administered in the ED  · Likely secondary to acute pancreatitis episode  · Elevated lipase 989  · Prior history of nonalcoholic pancreatitis  · However, CT abdomen pelvis demonstrated "no acute abnormality in the abdomen or pelvis"  · With history of gastroparesis with gastric stimulator in place, will continue to monitor in case decompensation of her previously stable gastroparesis  · Repeat lipase ordered for AM in case falsely elevated in acute inflammatory response  · NPO  · Pain control  · Continue NSS at 125cc/hour  · GI consult pending

## 2022-12-15 NOTE — ASSESSMENT & PLAN NOTE
· Patient reports chronic opioid dependence to to history of cervical radiculopathy, polyarthralgia, and neuropathy     · Patient chronically on 15 mg oxycodone daily q4 hours PRN for pain control of these conditions   · Continue Lyrica 100 mg t i d and 25 mg b i d  · PDMP reviewed   · Followed outpatient by pain management, rheumatology, and orthopedics

## 2022-12-15 NOTE — ASSESSMENT & PLAN NOTE
Lab Results   Component Value Date    HGBA1C 5 7 02/28/2022       No results for input(s): POCGLU in the last 72 hours      Blood Sugar Average: Last 72 hrs:  · Continue home regimen of Lantus 22 units and lispro 6 units TID with meals  · SSI with Accucheks  · Hypoglycemia protocol as patient currently NPO

## 2022-12-15 NOTE — H&P
[facility]  H&P- Josh Walls 1970, 46 y o  female MRN: [id number]  Unit/Bed#: E5 -01 Encounter: [id number]  Primary Care Provider: Jessica Moralez PA-C   Date and time admitted to hospital: 12/15/2022  8:50 AM    * Nausea with vomiting  Assessment & Plan  · Patient presented to the ED with N/V x 3 days and severe epigastric pain  · Patient family medicine doctor reported, repeated ER visits for severe N/V and abdominal pain  · Reports that each time the patient was prescribed 5 mg Haldol which relieved these symptoms  Was provided with this medication outpatient to help prevent ER visits  · Zyprexa injection administered in the ED  · Likely secondary to acute pancreatitis episode  · Elevated lipase 989  · Prior history of nonalcoholic pancreatitis  · However, CT abdomen pelvis demonstrated "no acute abnormality in the abdomen or pelvis"  · With history of gastroparesis with gastric stimulator in place, will continue to monitor in case decompensation of her previously stable gastroparesis  · Repeat lipase ordered for AM in case falsely elevated in acute inflammatory response  · NPO  · Pain control  · Continue NSS at 125cc/hour  · GI consult pending      Gastroparesis  Assessment & Plan  · History of gastroparesis, has been stable after placement of gastric stimulator, previously followed by Lee Mcintosh  · Will continue home Protonix IV due to decreased oral intake  · Continue to monitor for evidence of sudden worsening of gastroparesis in the setting of new N/V  · GI consult pending    Other secondary hypertension  Assessment & Plan  · Patient blood pressures elevated with SBP > 180 at times  No history of primary hypertension, likely secondary to acute epigastric pain    · Hydralazine 10 mg IV for SBP > 160 available   · Continue to monitor     Polyarthritis  Assessment & Plan  · Patient with chronic pain, followed by rheumatology, orthopedics, and pain management  · Chronically on opioids for management   · Mobility limited to wheelchair due to pain  · Continue home medications and lidocaine patches   · PT/OT eval pending    Continuous opioid dependence ([facility] )  Assessment & Plan  · Patient reports chronic opioid dependence to to history of cervical radiculopathy, polyarthralgia, and neuropathy  · Patient chronically on 15 mg oxycodone daily q4 hours PRN for pain control of these conditions   · Continue Lyrica 100 mg t i d and 25 mg b i d  · PDMP reviewed   · Followed outpatient by pain management, rheumatology, and orthopedics    Chronic kidney disease, stage 4 (severe) ([facility] )  Assessment & Plan  Lab Results   Component Value Date    EGFR 32 12/15/2022    EGFR 32 10/17/2022    EGFR 31 10/17/2022    CREATININE 1 77 (H) 12/15/2022    CREATININE 1 78 (H) 10/17/2022    CREATININE 1 81 (H) 10/17/2022     · POA creatinine 1 77, which appears to be approximately the patients baseline  · Continue to monitor for signs of MARY in the setting of N/V, dehydration  · Daily weights, I/O  · Continue NSS ay 125cc/hour    Type 2 diabetes mellitus with stage 3 chronic kidney disease, with long-term current use of insulin ([facility] )  Assessment & Plan  Lab Results   Component Value Date    HGBA1C 5 7 02/28/2022       No results for input(s): POCGLU in the last 72 hours      Blood Sugar Average: Last 72 hrs:  · Continue home regimen of Lantus 22 units and lispro 6 units TID with meals  · SSI with Accucheks  · Hypoglycemia protocol as patient currently NPO     Cervical radiculopathy  Assessment & Plan  · Chronic, patient recently received steroid injections 12/13/2022  · Followed outpatient by orthopedics and pain management    Schizo-affective schizophrenia ([facility] )  Assessment & Plan  · Continue wellbutrin and adderall    Bipolar 1 disorder ([facility] )  Assessment & Plan  · Continue wellbutrin and adderall    Asthma  Assessment & Plan  · Stable  · Continue home inhaler regimen  · Albuterol available prn    MPA (microscopic polyangiitis) ([facility])  Assessment & Plan  · Stable, chronic  · Followed outpatient by rheumatology      VTE Pharmacologic Prophylaxis: VTE Score: 1 Low Risk (Score 0-2) - Encourage Ambulation  Code Status: Level 1 - Full Code   Discussion with family: Patient declined call to   Anticipated Length of Stay: Patient will be admitted on an inpatient basis with an anticipated length of stay of greater than 2 midnights secondary to nausea/vomiting, possible pancreatitis, GI consult pending  Total Time for Visit, including Counseling / Coordination of Care: 45 minutes Greater than 50% of this total time spent on direct patient counseling and coordination of care  Chief Complaint: "I have been in so much pain x 3 days"    History of Present Illness:  India Wells is a 46 y o  female with a PMH of nonalcoholic acute pancreatitis, gastroparesis with gastric stimulator, chronic polyarthralgias, cervical radiculopathy, opioid dependence, microscopic polyangiitis, DM2, CKD stage 4, bipolar one, schizoaffective disorder, who presents with N/V and epigastric pain x 3 days  Patient reports that she has been having nausea, vomiting, and epigastric pain times the last 3 days, but today her symptoms significantly worsened with intractable vomiting prompting her to seek emergent care  Patient's vomiting was controlled for period of time in the ED after receiving Zofran, Reglan, Zyprexa for about 4 hours, when she developed more pain and began to vomit again  She also reports that with the onset of pain she gets chills and diaphoretic  She reports that she has not been able to tolerate any food intake over the course of her illness  At the time of my encounter, patient reported that her pain was approximately 4/10, but was obviously uncomfortable lying in bed   She denies any other associated symptoms such as diarrhea, constipation, fever, chills, chest pain, SOB, palpitations, or pedal edema  Patient denies any alcohol use, but states she is on opioids chronically due to her various pain syndromes  Review of Systems:  Review of Systems   Constitutional: Positive for appetite change (anorexia), chills, diaphoresis and fatigue  Negative for fever  HENT: Negative for congestion, ear pain and sore throat  Eyes: Negative for pain and visual disturbance  Respiratory: Negative for cough, chest tightness, shortness of breath and wheezing  Cardiovascular: Negative for chest pain, palpitations and leg swelling  Gastrointestinal: Positive for abdominal pain, nausea and vomiting  Negative for abdominal distention, blood in stool, constipation and diarrhea  Genitourinary: Negative for difficulty urinating, dysuria, flank pain, hematuria and urgency  Musculoskeletal: Positive for arthralgias (chronic) and back pain (chronic)  Negative for myalgias  Skin: Negative for color change and rash  Neurological: Negative for dizziness, tremors, seizures, syncope, light-headedness and headaches  Psychiatric/Behavioral: Negative for agitation, behavioral problems and confusion  All other systems reviewed and are negative  Past Medical and Surgical History:   Past Medical History:   Diagnosis Date   • ADHD    • Anemia of chronic disease    • Anxiety    • Arthritis ? • Asthma    • Bipolar disorder ([facility] )    • Borderline personality disorder ([facility] )    • Cataplexy    • Chronic abdominal pain    • Chronic kidney disease ? • CKD (chronic kidney disease) stage 3, GFR 30-59 ml/min ([facility])    • Cushing syndrome ([facility])    • Depression ?    • Diabetes mellitus ([facility] )    • DVT (deep venous thrombosis) ([facility])    • GERD (gastroesophageal reflux disease)    • Headache(784 0) 3 months   • History of acute pancreatitis     felt secondary to Bactrim   • History of transfusion    • Hypertension    • Liver disease     fatty liver   • Microscopic polyangiitis (H[facility])    • Ovarian cyst    • PTSD (post-traumatic stress disorder)    • Self-inflicted injury     self inflicted skin wounds   • Sleep apnea    • Wegener's granulomatosis with renal involvement ([facility] ) 2015       Past Surgical History:   Procedure Laterality Date   • ESOPHAGOGASTRODUODENOSCOPY  09/11/2015    mild antral gastritis   • GASTRIC STIMULATOR IMPLANT SURGERY  06/25/2020   • TN COLONOSCOPY FLX DX W/COLLJ SPEC WHEN PFRMD N/A 12/14/2018    adenoma removed from the transverse, hyperplastic polyp removed from the left colon   • TN ESOPHAGOGASTRODUODENOSCOPY TRANSORAL DIAGNOSTIC N/A 12/14/2018    gastritis and scant coffee-ground material   Biopsies negative for H  pylori   • TN OPEN TX RADIAL & ULNAR SHAFT FX FIX RADIUS AND ULNA Left 6/23/2022    Procedure: OPEN REDUCTION W/ INTERNAL FIXATION (ORIF) RADIUS / ULNA (WRIST); Surgeon: Evan Briones MD;  Location: [facility];  Service: Orthopedics   • RELEASE SCAR CONTRACTURE / GRAFT REPAIRS OF HAND Bilateral    • UPPER GASTROINTESTINAL ENDOSCOPY  12/26/2019    Dr Tani Galvan  Botox to the pylorus       Meds/Allergies:  Prior to Admission medications    Medication Sig Start Date End Date Taking?  Authorizing Provider   acetaminophen (TYLENOL) 500 mg tablet Take 2 tablets (1,000 mg total) by mouth every 8 (eight) hours as needed for mild pain 2/28/22   Katarzyna Thrasher PA-C   al mag oxide-diphenhydramine-lidocaine viscous (MAGIC MOUTHWASH) 1:1:1 suspension Swish and spit 10 mL every 4 (four) hours as needed for mouth pain or discomfort 9/2/22   Katarzyna Thrasher PA-C   albuterol (2 5 mg/3 mL) 0 083 % nebulizer solution USE 1 VIAL VIA NEBULIZER EVERY 6 HOURS AS NEEDED FOR WHEEZING OR SHORTNESS OF BREATH 7/19/22   Katarzyna Thrasher PA-C   albuterol (PROVENTIL HFA,VENTOLIN HFA) 90 mcg/act inhaler INHALE 2 PUFFS BY MOUTH EVERY 6 HOURS AS NEEDED FOR WHEEZING OR SHORTNESS OF BREATH 8/12/22   Katarzyna Thrasher PA-C   Alcohol Swabs (Alcohol Pads) 70 % PADS Use 4 (four) times a day 12/1/21   Cuong Lock PA-C amphetamine-dextroamphetamine (ADDERALL XR) 20 MG 24 hr capsule Take 1 capsule (20 mg total) by mouth 2 (two) times a day Max Daily Amount: 40 mg 12/9/22   Katarzyna Thrasher PA-C   Blood Glucose Monitoring Suppl (FreeStyle Lite) DEIRDRE Use daily 9/21/22   Katarzyna Thrasher PA-C   Blood Pressure Monitor KIT Use daily to check blood pressure  9/15/22   Katarzyna Thrasher PA-C   buPROPion (WELLBUTRIN XL) 300 mg 24 hr tablet TAKE ONE TABLET BY MOUTH EVERY MORNING 11/15/22   Katarzyna Thrasher PA-C   calcium carbonate (OS-ALISIA) 600 MG tablet Take 1 tablet (600 mg total) by mouth daily 9/2/22   Katarzyna Thrasher PA-C   Cholecalciferol (Vitamin D) 50 MCG (2000 UT) tablet Take 1 tablet (2,000 Units total) by mouth daily 9/2/22   Katarzyna Thrasher PA-C   clobetasol (TEMOVATE) 0 05 % cream APPLY TOPICALLY TO AFFECTED AREA TWICE A DAY 11/30/22   Katarzyna Thrasher PA-C   Comfort Touch Insulin Pen Need 33G X 6 MM MISC USE TO INJECT INSULIN FOUR TIMES A DAY ( WITH MEALS AND AT BEDTIME ) 10/28/22   Katarzyna Thrasher PA-C   Diclofenac Sodium (VOLTAREN) 1 % APPLY 2GM TOPICALLY FOUR TIMES A DAY 12/13/22   Katarzyna Thrasher PA-C   dicyclomine (BENTYL) 20 mg tablet Take 1 tablet (20 mg total) by mouth in the morning and 1 tablet (20 mg total) in the evening  Do all this for 7 days   5/12/22 8/4/22  Zamzam Coy MD   Easy Comfort Lancets MISC USE TO TEST THE BLOOD SUGAR THREE TIMES A DAY 9/20/22   Katarzyna Thrasher PA-C   glucose blood (FREESTYLE LITE) test strip USE TO TEST THE BLOOD SUGAR THREE TIMES A DAY AS DIRECTED 9/8/22   Katarzyna Thrasher PA-C   haloperidol (HALDOL) 5 mg tablet TAKE ONE TABLET BY MOUTH ONCE DAILY AS NEEDED FOR ABDOMINAL PAIN / NAUSEA 11/10/22   Katarzyna A Durako, PA-C   hydrocortisone 2 5 % cream Apply topically 2 (two) times a day as needed for rash 10/26/22   Katarzyna Thrasher PA-C   insulin glargine (Toujeo Max SoloStar) 300 units/mL CONCENTRATED U-300 injection pen (2-unit dial) Inject 22 Units under the skin daily 10/26/22   Katarzyna Thrasher PA-C   lidocaine (LMX) 4 % cream Apply topically as needed for mild pain 6/28/22   Ej Bledsoe MD   lidocaine (XYLOCAINE) 5 % ointment APPLY TOPICALLY 2GM TWICE A DAY TO AFFECTED AREAS AS NEEDED FOR MILD PAIN  Patient taking differently: Lidocaine patches 12/10/20   Katarzyna Thrasher PA-C   Linzess 72 MCG CAPS TAKE ONE CAPSULE BY MOUTH ONCE DAILY 2/15/22   Cristo Weber PA-C   naloxone (Narcan) 4 mg/0 1 mL nasal spray ADMINISTER 1 SPRAY IN ONE NOSTRIL IF NO REPONSE AFTER 2-3 MINUTES SPRAY INTO OTHER NOSTRIL WITH NEW spray 10/16/22   PRASANTH Pa   [facility] Provider, MD   NovoLOG FlexPen 100 units/mL injection pen INJECT 6 UNITS UNDER THE SKIN THREE TIMES A DAY WITH MEALS 10/4/22   Katarzyna Thrasher PA-C   nystatin-triamcinolone (MYCOLOG-II) ointment Apply topically 2 (two) times a day 10/26/22   Katarzyna Thrasher PA-C   ondansetron (Zofran ODT) 8 mg disintegrating tablet Take 1 tablet (8 mg total) by mouth every 8 (eight) hours as needed for nausea or vomiting 10/26/22   Katarzyna Thrasher PA-C   oxyCODONE (Roxicodone) 15 mg immediate release tablet Take 1 tablet (15 mg total) by mouth every 4 (four) hours as needed for moderate pain Max Daily Amount: 90 mg 11/28/22   Katarzyna Thrasher PA-C   pantoprazole (PROTONIX) 40 mg tablet Take 1 tablet (40 mg total) by mouth 2 (two) times a day before meals 1/18/22   Katarzyna Thrasher PA-C   pregabalin (LYRICA) 100 mg capsule Take 1 capsule (100 mg total) by mouth 3 (three) times a day 7/21/22   PRASANTH Mireles   pregabalin (LYRICA) 25 mg capsule Take 1 capsule (25 mg total) by mouth 2 (two) times a day 11/2/22   Gailmichell Otoole PA-C   promethazine (PHENERGAN) 25 mg tablet Take 1 tablet (25 mg total) by mouth every 6 (six) hours as needed for nausea or vomiting 5/10/21   Jesus Hernández PA-C   Restasis 0 05 % ophthalmic emulsion  12/29/21   Historical Provider, MD   scopolamine (TRANSDERM-SCOP) 1 5 mg/3 days TD 72 hr patch Place 1 patch on the skin every third day 21   HOLLIS Pineda   sucralfate (CARAFATE) 1 g/10 mL suspension TAKE 10ML BY MOUTH THREE TIMES A DAY  Patient not taking: Reported on 2022 5/10/22   Jane Soria PA-C   sucralfate (CARAFATE) 1 g/10 mL suspension Take 10 mL (1 g total) by mouth in the morning and 10 mL (1 g total) at noon and 10 mL (1 g total) in the evening and 10 mL (1 g total) before bedtime  Do all this for 7 days  Patient not taking: Reported on 2022  Chase Ramirez MD   Symbicort 80-4 5 MCG/ACT inhaler INHALE 2 PUFFS BY MOUTH TWICE A DAY RINSE MOUTH AFTER USE 22   Katarzyna Thrasher PA-C   TiZANidine (ZANAFLEX) 4 MG capsule Take 1 capsule (4 mg total) by mouth 3 (three) times a day 10/5/22   Katarzyna Thrasher PA-C     I have reviewed home medications using recent Epic encounter  Allergies:    Allergies   Allergen Reactions   • Prozac [Fluoxetine Hcl]      SI   • Bactrim [Sulfamethoxazole-Trimethoprim]      Pt "They think that is what cause the pancreatitis"    • Flagyl [Metronidazole] Diarrhea and Abdominal Pain   • Lamictal [Lamotrigine] GI Intolerance   • Lithium Other (See Comments)   • Haldol [Haloperidol] Other (See Comments)     "I don't like it"   • Ibuprofen    • Lexapro [Escitalopram Oxalate] Rash   • Navane [Thiothixene]      SI   • Other      "novaine?" antipsychotic       Social History:  Marital Status: Single   Occupation:   Patient Pre-hospital Living Situation: Home  Patient Pre-hospital Level of Mobility: manual wheelchair  Patient Pre-hospital Diet Restrictions: Diabetic, low sodium, renal  Substance Use History:   Social History     Substance and Sexual Activity   Alcohol Use Never     Social History     Tobacco Use   Smoking Status Former   • Packs/day: 1 00   • Years: 10 00   • Pack years: 10    • Types: Cigarettes   • Quit date: 2011   • Years since quittin 9   Smokeless Tobacco Never Tobacco Comments    Stopped smoking 11 years ago     Social History     Substance and Sexual Activity   Drug Use Yes   • Types: Marijuana    Comment: marijuana daily       Family History:  Family History   Problem Relation Age of Onset   • Arthritis Mother    • Depression Mother    • Diabetes Mother    • Mental illness Mother    • Migraines Mother    • No Known Problems Father    • Colon cancer Neg Hx    • Drug abuse Neg Hx         mother father   • Mental illness Neg Hx         disorder, mother father   • Cancer Neg Hx    • Breast cancer Neg Hx        Physical Exam:     Vitals:   Blood Pressure: 164/81 (12/15/22 1538)  Pulse: (!) 53 (12/15/22 1538)  Temperature: 98 °F (36 7 °C) (12/15/22 1538)  Temp Source: Temporal (12/15/22 1538)  Respirations: 20 (12/15/22 1538)  SpO2: 99 % (12/15/22 1538)    Physical Exam  Vitals and nursing note reviewed  Constitutional:       General: She is not in acute distress  Appearance: She is well-developed  She is not diaphoretic  HENT:      Head: Normocephalic and atraumatic  Nose: Nose normal  No congestion  Mouth/Throat:      Mouth: Mucous membranes are dry  Pharynx: Oropharynx is clear  Eyes:      Conjunctiva/sclera: Conjunctivae normal    Cardiovascular:      Rate and Rhythm: Normal rate and regular rhythm  Heart sounds: Normal heart sounds  No murmur heard  No gallop  Pulmonary:      Effort: Pulmonary effort is normal  No respiratory distress  Breath sounds: Normal breath sounds  No wheezing, rhonchi or rales  Abdominal:      General: Bowel sounds are normal  There is no distension  Palpations: Abdomen is soft  Tenderness: There is abdominal tenderness (epigastric)  Musculoskeletal:         General: No swelling  Cervical back: Neck supple  Right lower leg: No edema  Left lower leg: No edema  Skin:     General: Skin is warm and dry  Capillary Refill: Capillary refill takes less than 2 seconds  Findings: No bruising or rash  Neurological:      Mental Status: She is alert and oriented to person, place, and time  Mental status is at baseline  Psychiatric:         Mood and Affect: Mood normal          Behavior: Behavior normal           Additional Data:     Lab Results:  Results from last 7 days   Lab Units 12/15/22  0901   WBC Thousand/uL 11 69*   HEMOGLOBIN g/dL 13 8   HEMATOCRIT % 40 7   PLATELETS Thousands/uL 240   NEUTROS PCT % 86*   LYMPHS PCT % 3*   MONOS PCT % 10   EOS PCT % 0     Results from last 7 days   Lab Units 12/15/22  0901   SODIUM mmol/L 142   POTASSIUM mmol/L 3 7   CHLORIDE mmol/L 108   CO2 mmol/L 20*   BUN mg/dL 36*   CREATININE mg/dL 1 77*   ANION GAP mmol/L 14*   CALCIUM mg/dL 9 9   ALBUMIN g/dL 4 2   TOTAL BILIRUBIN mg/dL 0 36   ALK PHOS U/L 124*   ALT U/L 24   AST U/L 24   GLUCOSE RANDOM mg/dL 132                       Lines/Drains:  Invasive Devices     Peripheral Intravenous Line  Duration           Peripheral IV 12/15/22 Right Antecubital <1 day                    Imaging: Reviewed radiology reports from this admission including: abdominal/pelvic CT  CT abdomen pelvis with contrast   Final Result by Maurice Henderson MD (12/15 1412)      No acute abnormality in the abdomen or pelvis  Workstation performed: [id number]         FL spine and pain procedure    (Results Pending)       EKG and Other Studies Reviewed on Admission:   · EKG: NSR  HR 66     ** Please Note: This note has been constructed using a voice recognition system   **

## 2022-12-15 NOTE — TELEPHONE ENCOUNTER
Patient is willing to start Prolia for her osteoporosis; meets criteria for osteoporosis since she has a history of left wrist fragility fracture in the presence of osteopenia on latest DEXA scan  Since she has a history of kidney disease, the best medication option for treatment of her osteoporosis is Prolia  Please obtain prior authorization for Prolia (denosumab) 60mg every 6 month injections for treatment of her osteoporosis  Is taking regular calcium and Vit  D  Once prior auth obtained, please scheduled her ASAP for Prolia only injection visit  She already has a follow-up scheduled in July for provider follow-up + next Prolia injection

## 2022-12-15 NOTE — ASSESSMENT & PLAN NOTE
· Patient with chronic pain, followed by rheumatology, orthopedics, and pain management  · Chronically on opioids for management   · Mobility limited to wheelchair due to pain  · Continue home medications and lidocaine patches   · PT/OT eval pending

## 2022-12-15 NOTE — CASE MANAGEMENT
Case Management ED Progress Note    Patient name Iza Alex  [facility] ED 17/ED 17 MRN [id number]  : 1970 Date 12/15/2022        OBJECTIVE:  Predictive Model Details         95% Factor Value    Risk of Hospital Admission or ED Visit Model Number of ED Visits 5+     Has Medicaid Yes     Number of Hospitalizations 2     Is in Relationship No     Has Chronic Kidney Disease Yes     Has Anemia Yes     Has Depression Yes     Has Diabetes Yes     Has Asthma Yes     Has PCP Yes            Chief Complaint: Vomiting   Patient Class: Emergency  Preferred Pharmacy:   [facility] [address]  [address]  [place] [address]  Phone: [phone] Fax: [phone]    Primary Care Provider: Wilfred Henriquez PA-C    Primary Insurance: [address] [place], [place] [address]Th [facility]  Secondary Insurance: [address] [place] [address]    ED Progress Note:    Patient complains of Anell Flakes pain/vomitingPatient was alert /oriented  Patient was uncooperative with interview process, short with responses not elaborating   Patient 's [address]  Patient uses a wheel chair manual/electric  Patient has 40 hours per week of non medical home care services  At this time discharge is pending  THIS WORKER TO FOLLOW

## 2022-12-15 NOTE — TELEPHONE ENCOUNTER
Called patient to schedule B/L SIJ inj  She was in the hospital and unable to talk  Told her to call the office when it's convenient and she is ready to schedule

## 2022-12-15 NOTE — CONSULTS
Consultation - [facility]) [facility]  Tasneem Do 46 y o  female MRN: [id number]  Unit/Bed#: E5 -01 Encounter: [id number]        Consults    Reason for Consult / Principal Problem:     Intractable abdominal pain      ASSESSMENT AND PLAN:      43-year-old female with past medical history of diabetes mellitus type 2, gastroparesis status post gastric pacemaker borderline, personality disorder, bipolar disorder, GERD, Wegener's granulomatosis with renal involvement presents to the hospital with acute epigastric pain, nausea  GI is consulted for further management  1  Intractable abdominal pain  2  Nausea  Unclear etiology  She does not meet criteria for acute pancreatitis  Lipase is 989  CT with no acute abnormalities  Looking back she did have a lipase of 1700s  There are no signs on CT of chronic pancreatitis  Other etiologies include peptic ulcer disease, though pain is persistent versus functional pain/anxiety  Doubt more serious causes including mesenteric ischemia given normal CT with contrast   QTC is 411  · No indication for endoscopy at this time  · Recommend quick taper off of opioid therapy by tomorrow as this can worsen her gastroparesis  · Serial abdominal exam  · IV fluids per primary team   · Check right upper quadrant ultrasound to rule out microlithiasis  · Clear liquid diet as tolerated  · Continue as needed Zofran for nausea  · Continue as needed Reglan as second line  · Monitor QTC  · Monitor hemodynamics, urine output  3  Gastroparesis  Chronic history of diabetic gastroparesis status post pacemaker placement  She was undergoing evaluation for possible surgical management  · Continue Reglan as described above  · Continue PPI therapy as described below  · Recommend follow-up with outpatient gastroenterologist/surgeon  Patient may benefit from pyloroplasty in the future  4  GERD  Currently stable  Low suspicion for upper GI bleed    · Change PPI to 40 mg p o  twice daily  · Monitor symptoms  5   Chronic constipation  Has been having bowel movements appropriately  · Continue home medications including Linzess/Colace daily  Rest of care per primary team   Thank you for this consultation  ______________________________________________________________________    HPI:  31-year-old female with past medical history of diabetes mellitus type 2, gastroparesis status post gastric pacemaker borderline, personality disorder, bipolar disorder, GERD, Wegener's granulomatosis with renal involvement presents to the hospital with acute epigastric pain, nausea  GI is consulted for further management  He reports having epigastric and left upper quadrant pain that radiates to the back for approximately 1 day  Persistent  Associated with nausea and dry heaves  Denies any hematemesis, coffee-ground emesis, melena, hematochezia  No recent travel  Denies any alcohol or drug use  Denies significant NSAID use  Of note patient appeared fidgety during examination and was yelling out that she was in pain during the whole exam, though she appeared hemodynimically stable  EGD in March 2022 which was normal   Biopsies were negative for EOE, H  pylori, celiac disease  Colonoscopy in 2018 showed 1 tubular adenoma  REVIEW OF SYSTEMS:    Review of Systems   Constitutional: Negative for chills and fever  HENT: Negative for congestion and sinus pressure  Respiratory: Negative for cough and shortness of breath  Cardiovascular: Negative for chest pain, palpitations and leg swelling  Gastrointestinal: Positive for abdominal pain and nausea  Negative for diarrhea and vomiting  Genitourinary: Negative for dysuria and hematuria  Musculoskeletal: Negative for arthralgias and back pain  Skin: Negative for color change and rash  Neurological: Negative for dizziness and headaches  Psychiatric/Behavioral: Negative for agitation and confusion     All other systems reviewed and are negative  Historical Information   Past Medical History:   Diagnosis Date   • ADHD    • Anemia of chronic disease    • Anxiety    • Arthritis ? • Asthma    • Bipolar disorder ([place] [address] )    • Borderline personality disorder ([place] [address] )    • Cataplexy    • Chronic abdominal pain    • Chronic kidney disease ? • CKD (chronic kidney disease) stage 3, GFR 30-59 ml/min (HCC)    • Cushing syndrome (HCC)    • Depression ? • Diabetes mellitus ([place] [address] )    • DVT (deep venous thrombosis) (HCC)    • GERD (gastroesophageal reflux disease)    • Headache(784 0) 3 months   • History of acute pancreatitis     felt secondary to Bactrim   • History of transfusion    • Hypertension    • Liver disease     fatty liver   • Microscopic polyangiitis (HCC)    • Ovarian cyst    • PTSD (post-traumatic stress disorder)    • Self-inflicted injury     self inflicted skin wounds   • Sleep apnea    • Wegener's granulomatosis with renal involvement ([place] [address] ) 2015     Past Surgical History:   Procedure Laterality Date   • ESOPHAGOGASTRODUODENOSCOPY  09/11/2015    mild antral gastritis   • GASTRIC STIMULATOR IMPLANT SURGERY  06/25/2020   • IL COLONOSCOPY FLX DX W/COLLJ SPEC WHEN PFRMD N/A 12/14/2018    adenoma removed from the transverse, hyperplastic polyp removed from the left colon   • IL ESOPHAGOGASTRODUODENOSCOPY TRANSORAL DIAGNOSTIC N/A 12/14/2018    gastritis and scant coffee-ground material   Biopsies negative for H  pylori   • IL OPEN TX RADIAL & ULNAR SHAFT FX FIX RADIUS AND ULNA Left 6/23/2022    Procedure: OPEN REDUCTION W/ INTERNAL FIXATION (ORIF) RADIUS / ULNA (WRIST); Surgeon: Florecita Hart MD;  Location: [facility];  Service: Orthopedics   • RELEASE SCAR CONTRACTURE / GRAFT REPAIRS OF HAND Bilateral    • UPPER GASTROINTESTINAL ENDOSCOPY  12/26/2019    Dr Sheila Dodd   Botox to the pylorus     Social History   Social History     Substance and Sexual Activity   Alcohol Use Never     Social History     Substance and Sexual Activity Drug Use Yes   • Types: Marijuana    Comment: marijuana daily     Social History     Tobacco Use   Smoking Status Former   • Packs/day: 1 00   • Years: 10 00   • Pack years: 10 00   • Types: Cigarettes   • Quit date: 2011   • Years since quittin 9   Smokeless Tobacco Never   Tobacco Comments    Stopped smoking 11 years ago     Family History   Problem Relation Age of Onset   • Arthritis Mother    • Depression Mother    • Diabetes Mother    • Mental illness Mother    • Migraines Mother    • No Known Problems Father    • Colon cancer Neg Hx    • Drug abuse Neg Hx         mother father   • Mental illness Neg Hx         disorder, mother father   • Cancer Neg Hx    • Breast cancer Neg Hx        Meds/Allergies     Medications Prior to Admission   Medication   • acetaminophen (TYLENOL) 500 mg tablet   • al mag oxide-diphenhydramine-lidocaine viscous (MAGIC MOUTHWASH) 1:1:1 suspension   • albuterol (2 5 mg/3 mL) 0 083 % nebulizer solution   • albuterol (PROVENTIL HFA,VENTOLIN HFA) 90 mcg/act inhaler   • Alcohol Swabs (Alcohol Pads) 70 % PADS   • amphetamine-dextroamphetamine (ADDERALL XR) 20 MG 24 hr capsule   • Blood Glucose Monitoring Suppl (FreeStyle Lite) DEIRDRE   • Blood Pressure Monitor KIT   • buPROPion (WELLBUTRIN XL) 300 mg 24 hr tablet   • calcium carbonate (OS-ALISIA) 600 MG tablet   • Cholecalciferol (Vitamin D) 50 MCG (2000) tablet   • clobetasol (TEMOVATE) 0 05 % cream   • Comfort Touch Insulin Pen Need 33G X 6 MM MISC   • Diclofenac Sodium (VOLTAREN) 1 %   • dicyclomine (BENTYL) 20 mg tablet   • Easy Comfort Lancets MISC   • glucose blood (FREESTYLE LITE) test strip   • haloperidol (HALDOL) 5 mg tablet   • hydrocortisone 2 5 % cream   • insulin glargine (Toujeo Max SoloStar) 300 units/mL CONCENTRATED U-300 injection pen (2-unit dial)   • lidocaine (LMX) 4 % cream   • lidocaine (XYLOCAINE) 5 % ointment   • Linzess 72 MCG CAPS   • naloxone (Narcan) 4 mg/0 1 mL nasal spray   • NON FORMULARY   • NovoLOG FlexPen 100 units/mL injection pen   • nystatin-triamcinolone (MYCOLOG-II) ointment   • ondansetron (Zofran ODT) 8 mg disintegrating tablet   • oxyCODONE (Roxicodone) 15 mg immediate release tablet   • pantoprazole (PROTONIX) 40 mg tablet   • pregabalin (LYRICA) 100 mg capsule   • pregabalin (LYRICA) 25 mg capsule   • promethazine (PHENERGAN) 25 mg tablet   • Restasis 0 05 % ophthalmic emulsion   • scopolamine (TRANSDERM-SCOP) 1 5 mg/3 days TD 72 hr patch   • sucralfate (CARAFATE) 1 g/10 mL suspension   • sucralfate (CARAFATE) 1 g/10 mL suspension   • Symbicort 80-4 5 MCG/ACT inhaler   • TiZANidine (ZANAFLEX) 4 MG capsule     Current Facility-Administered Medications   Medication Dose Route Frequency   • acetaminophen (TYLENOL) tablet 650 mg  650 mg Oral Q6H PRN   • albuterol (PROVENTIL HFA,VENTOLIN HFA) inhaler 2 puff  2 puff Inhalation Q6H PRN   • [START ON 12/16/2022] amphetamine-dextroamphetamine (ADDERALL) tablet 20 mg  20 mg Oral BID before breakfast/lunch   • budesonide-formoterol (SYMBICORT) 80-4 5 MCG/ACT inhaler 2 puff  2 puff Inhalation BID   • buPROPion (WELLBUTRIN XL) 24 hr tablet 300 mg  300 mg Oral Daily   • docusate sodium (COLACE) capsule 100 mg  100 mg Oral BID   • haloperidol (HALDOL) tablet 5 mg  5 mg Oral Daily PRN   • hydrALAZINE (APRESOLINE) injection 10 mg  10 mg Intravenous Q6H PRN   • HYDROmorphone HCl (DILAUDID) injection 0 2 mg  0 2 mg Intravenous Q3H PRN   • [START ON 12/16/2022] insulin glargine (LANTUS) subcutaneous injection 22 Units 0 22 mL  22 Units Subcutaneous QAM   • insulin lispro (HumaLOG) 100 units/mL subcutaneous injection 1-5 Units  1-5 Units Subcutaneous TID AC   • insulin lispro (HumaLOG) 100 units/mL subcutaneous injection 1-5 Units  1-5 Units Subcutaneous HS   • insulin lispro (HumaLOG) 100 units/mL subcutaneous injection 6 Units  6 Units Subcutaneous TID With Meals   • lidocaine (LIDODERM) 5 % patch 1 patch  1 patch Topical BID PRN   • linaCLOtide CAPS 1 capsule  1 capsule Oral Daily   • metoclopramide (REGLAN) injection 10 mg  10 mg Intravenous Q6H PRN   • ondansetron (ZOFRAN) injection 4 mg  4 mg Intravenous Q6H PRN   • oxyCODONE (ROXICODONE) immediate release tablet 20 mg  20 mg Oral Q4H PRN   • oxyCODONE (ROXICODONE) IR tablet 15 mg  15 mg Oral Q4H PRN   • pantoprazole (PROTONIX) injection 40 mg  40 mg Intravenous Q12H [address]   • pregabalin (LYRICA) capsule 100 mg  100 mg Oral TID   • pregabalin (LYRICA) capsule 25 mg  25 mg Oral BID   • sodium chloride 0 9 % infusion  125 mL/hr Intravenous Continuous   • TiZANidine (ZANAFLEX) capsule 4 mg  4 mg Oral TID       Allergies   Allergen Reactions   • Prozac [Fluoxetine Hcl]      SI   • Bactrim [Sulfamethoxazole-Trimethoprim]      Pt "They think that is what cause the pancreatitis"    • Flagyl [Metronidazole] Diarrhea and Abdominal Pain   • Lamictal [Lamotrigine] GI Intolerance   • Lithium Other (See Comments)   • Haldol [Haloperidol] Other (See Comments)     "I don't like it"   • Ibuprofen    • Lexapro [Escitalopram Oxalate] Rash   • Navane [Thiothixene]      SI   • Other      "novaine?" antipsychotic           Objective     Blood pressure 164/81, pulse (!) 53, temperature 98 °F (36 7 °C), temperature source Temporal, resp  rate 20, SpO2 99 %, not currently breastfeeding  There is no height or weight on file to calculate BMI  No intake or output data in the 24 hours ending 12/15/22 1601      PHYSICAL EXAM:      Physical Exam  Vitals and nursing note reviewed  Constitutional:       General: She is in acute distress  Appearance: Normal appearance  She is well-developed  She is not ill-appearing  HENT:      Head: Normocephalic and atraumatic  Mouth/Throat:      Mouth: Mucous membranes are moist    Eyes:      Extraocular Movements: Extraocular movements intact  Conjunctiva/sclera: Conjunctivae normal    Cardiovascular:      Rate and Rhythm: Normal rate  Pulses: Normal pulses     Pulmonary:      Effort: Pulmonary effort is normal    Abdominal:      General: Abdomen is flat  Bowel sounds are normal  There is no distension  Palpations: Abdomen is soft  Tenderness: There is abdominal tenderness  There is no guarding  Comments: Of note patient had pain even without palpation of the abdomen and just touching abdominal skin  Musculoskeletal:      Cervical back: Neck supple  Right lower leg: No edema  Left lower leg: No edema  Skin:     General: Skin is warm and dry  Neurological:      General: No focal deficit present  Mental Status: She is alert and oriented to person, place, and time  Psychiatric:         Mood and Affect: Mood is anxious            Lab Results:   Admission on 12/15/2022   Component Date Value   • WBC 12/15/2022 11 69 (H)    • RBC 12/15/2022 4 41    • Hemoglobin 12/15/2022 13 8    • Hematocrit 12/15/2022 40 7    • MCV 12/15/2022 92    • MCH 12/15/2022 31 3    • MCHC 12/15/2022 33 9    • RDW 12/15/2022 12 9    • MPV 12/15/2022 10 4    • Platelets 29/29/1324 240    • nRBC 12/15/2022 0    • Neutrophils Relative 12/15/2022 86 (H)    • Immat GRANS % 12/15/2022 1    • Lymphocytes Relative 12/15/2022 3 (L)    • Monocytes Relative 12/15/2022 10    • Eosinophils Relative 12/15/2022 0    • Basophils Relative 12/15/2022 0    • Neutrophils Absolute 12/15/2022 10 07 (H)    • Immature Grans Absolute 12/15/2022 0 06    • Lymphocytes Absolute 12/15/2022 0 38 (L)    • Monocytes Absolute 12/15/2022 1 16    • Eosinophils Absolute 12/15/2022 0 01    • Basophils Absolute 12/15/2022 0 01    • Sodium 12/15/2022 142    • Potassium 12/15/2022 3 7    • Chloride 12/15/2022 108    • CO2 12/15/2022 20 (L)    • ANION GAP 12/15/2022 14 (H)    • BUN 12/15/2022 36 (H)    • Creatinine 12/15/2022 1 77 (H)    • Glucose 12/15/2022 132    • Calcium 12/15/2022 9 9    • eGFR 12/15/2022 32    • Total Bilirubin 12/15/2022 0 36    • Bilirubin, Direct 12/15/2022 0 15    • Alkaline Phosphatase 12/15/2022 124 (H)    • AST 12/15/2022 24    • ALT 12/15/2022 24    • Total Protein 12/15/2022 8 7 (H)    • Albumin 12/15/2022 4 2    • Lipase 12/15/2022 989 (H)    • Color, UA 12/15/2022 Yellow    • Clarity, UA 12/15/2022 Clear    • pH, UA 12/15/2022 5 5    • Leukocytes, UA 12/15/2022 Negative    • Nitrite, UA 12/15/2022 Negative    • Protein, UA 12/15/2022 >=300 (A)    • Glucose, UA 12/15/2022 Negative    • Ketones, UA 12/15/2022 Trace (A)    • Urobilinogen, UA 12/15/2022 0 2    • Bilirubin, UA 12/15/2022 Negative    • Occult Blood, UA 12/15/2022 Small (A)    • Specific Gravity, UA 12/15/2022 >=1 030    • RBC, UA 12/15/2022 1-2 (A)    • WBC, UA 12/15/2022 0-1 (A)    • Epithelial Cells 12/15/2022 Occasional    • Bacteria, UA 12/15/2022 Occasional        Imaging Studies: I have personally reviewed pertinent imaging studies  [facility]    Gastroenterology Fellow  PGY-4  Available via [employer]  12/15/2022 4:01 PM

## 2022-12-15 NOTE — ASSESSMENT & PLAN NOTE
· History of gastroparesis, has been stable after placement of gastric stimulator, previously followed by [facility]  · Will continue home Protonix IV due to decreased oral intake  · Continue to monitor for evidence of sudden worsening of gastroparesis in the setting of new N/V  · GI consult pending

## 2022-12-15 NOTE — ASSESSMENT & PLAN NOTE
· Patient blood pressures elevated with SBP > 180 at times  No history of primary hypertension, likely secondary to acute epigastric pain    · Hydralazine 10 mg IV for SBP > 160 available   · Continue to monitor

## 2022-12-16 VITALS
OXYGEN SATURATION: 97 % | WEIGHT: 142.2 LBS | RESPIRATION RATE: 20 BRPM | SYSTOLIC BLOOD PRESSURE: 144 MMHG | HEART RATE: 71 BPM | TEMPERATURE: 99 F | DIASTOLIC BLOOD PRESSURE: 80 MMHG | BODY MASS INDEX: 26.01 KG/M2

## 2022-12-16 PROBLEM — J10.1 INFLUENZA A: Status: ACTIVE | Noted: 2022-12-16

## 2022-12-16 LAB
ALBUMIN SERPL BCP-MCNC: 3.2 G/DL (ref 3.5–5)
ALP SERPL-CCNC: 97 U/L (ref 46–116)
ALT SERPL W P-5'-P-CCNC: 19 U/L (ref 12–78)
ANION GAP SERPL CALCULATED.3IONS-SCNC: 16 MMOL/L (ref 4–13)
AST SERPL W P-5'-P-CCNC: 22 U/L (ref 5–45)
BASOPHILS # BLD AUTO: 0 THOUSANDS/ÂΜL (ref 0–0.1)
BASOPHILS NFR BLD AUTO: 0 % (ref 0–1)
BILIRUB SERPL-MCNC: 0.22 MG/DL (ref 0.2–1)
BUN SERPL-MCNC: 32 MG/DL (ref 5–25)
CALCIUM ALBUM COR SERPL-MCNC: 9 MG/DL (ref 8.3–10.1)
CALCIUM SERPL-MCNC: 8.4 MG/DL (ref 8.3–10.1)
CHLORIDE SERPL-SCNC: 106 MMOL/L (ref 96–108)
CO2 SERPL-SCNC: 19 MMOL/L (ref 21–32)
CREAT SERPL-MCNC: 2.03 MG/DL (ref 0.6–1.3)
EOSINOPHIL # BLD AUTO: 0 THOUSAND/ÂΜL (ref 0–0.61)
EOSINOPHIL NFR BLD AUTO: 0 % (ref 0–6)
ERYTHROCYTE [DISTWIDTH] IN BLOOD BY AUTOMATED COUNT: 13.3 % (ref 11.6–15.1)
FLUAV RNA RESP QL NAA+PROBE: POSITIVE
FLUBV RNA RESP QL NAA+PROBE: NEGATIVE
GFR SERPL CREATININE-BSD FRML MDRD: 27 ML/MIN/1.73SQ M
GLUCOSE SERPL-MCNC: 109 MG/DL (ref 65–140)
GLUCOSE SERPL-MCNC: 126 MG/DL (ref 65–140)
GLUCOSE SERPL-MCNC: 81 MG/DL (ref 65–140)
HCT VFR BLD AUTO: 35.6 % (ref 34.8–46.1)
HGB BLD-MCNC: 11.9 G/DL (ref 11.5–15.4)
IMM GRANULOCYTES # BLD AUTO: 0.02 THOUSAND/UL (ref 0–0.2)
IMM GRANULOCYTES NFR BLD AUTO: 0 % (ref 0–2)
LIPASE SERPL-CCNC: 497 U/L (ref 73–393)
LYMPHOCYTES # BLD AUTO: 0.35 THOUSANDS/ÂΜL (ref 0.6–4.47)
LYMPHOCYTES NFR BLD AUTO: 5 % (ref 14–44)
MCH RBC QN AUTO: 31.1 PG (ref 26.8–34.3)
MCHC RBC AUTO-ENTMCNC: 33.4 G/DL (ref 31.4–37.4)
MCV RBC AUTO: 93 FL (ref 82–98)
MONOCYTES # BLD AUTO: 1.15 THOUSAND/ÂΜL (ref 0.17–1.22)
MONOCYTES NFR BLD AUTO: 17 % (ref 4–12)
NEUTROPHILS # BLD AUTO: 5.12 THOUSANDS/ÂΜL (ref 1.85–7.62)
NEUTS SEG NFR BLD AUTO: 78 % (ref 43–75)
NRBC BLD AUTO-RTO: 0 /100 WBCS
PLATELET # BLD AUTO: 178 THOUSANDS/UL (ref 149–390)
PMV BLD AUTO: 11.1 FL (ref 8.9–12.7)
POTASSIUM SERPL-SCNC: 4.2 MMOL/L (ref 3.5–5.3)
PROT SERPL-MCNC: 7 G/DL (ref 6.4–8.4)
RBC # BLD AUTO: 3.83 MILLION/UL (ref 3.81–5.12)
RSV RNA RESP QL NAA+PROBE: NEGATIVE
SARS-COV-2 RNA RESP QL NAA+PROBE: NEGATIVE
SODIUM SERPL-SCNC: 141 MMOL/L (ref 135–147)
WBC # BLD AUTO: 6.64 THOUSAND/UL (ref 4.31–10.16)

## 2022-12-16 RX ORDER — OSELTAMIVIR PHOSPHATE 30 MG/1
30 CAPSULE ORAL EVERY 24 HOURS
Qty: 5 CAPSULE | Refills: 0 | Status: SHIPPED | OUTPATIENT
Start: 2022-12-16 | End: 2022-12-21

## 2022-12-16 RX ORDER — DIPHENHYDRAMINE HCL 25 MG
25 TABLET ORAL ONCE
Status: COMPLETED | OUTPATIENT
Start: 2022-12-16 | End: 2022-12-16

## 2022-12-16 RX ORDER — LUBIPROSTONE 8 UG/1
8 CAPSULE ORAL 2 TIMES DAILY WITH MEALS
Status: DISCONTINUED | OUTPATIENT
Start: 2022-12-16 | End: 2022-12-16 | Stop reason: HOSPADM

## 2022-12-16 RX ORDER — HEPARIN SODIUM 5000 [USP'U]/ML
5000 INJECTION, SOLUTION INTRAVENOUS; SUBCUTANEOUS EVERY 8 HOURS SCHEDULED
Status: DISCONTINUED | OUTPATIENT
Start: 2022-12-16 | End: 2022-12-16 | Stop reason: HOSPADM

## 2022-12-16 RX ORDER — OSELTAMIVIR PHOSPHATE 30 MG/1
30 CAPSULE ORAL EVERY 24 HOURS
Status: DISCONTINUED | OUTPATIENT
Start: 2022-12-16 | End: 2022-12-16 | Stop reason: HOSPADM

## 2022-12-16 RX ORDER — IPRATROPIUM BROMIDE AND ALBUTEROL SULFATE 2.5; .5 MG/3ML; MG/3ML
3 SOLUTION RESPIRATORY (INHALATION) EVERY 6 HOURS PRN
Status: DISCONTINUED | OUTPATIENT
Start: 2022-12-16 | End: 2022-12-16 | Stop reason: HOSPADM

## 2022-12-16 RX ADMIN — INSULIN LISPRO 6 UNITS: 100 INJECTION, SOLUTION INTRAVENOUS; SUBCUTANEOUS at 09:07

## 2022-12-16 RX ADMIN — TIZANIDINE 4 MG: 4 TABLET ORAL at 09:06

## 2022-12-16 RX ADMIN — OXYCODONE HYDROCHLORIDE 20 MG: 10 TABLET ORAL at 12:06

## 2022-12-16 RX ADMIN — PREGABALIN 25 MG: 25 CAPSULE ORAL at 09:07

## 2022-12-16 RX ADMIN — DEXTROAMPHETAMINE SACCHARATE, AMPHETAMINE ASPARTATE, DEXTROAMPHETAMINE SULFATE AND AMPHETAMINE SULFATE 20 MG: 2.5; 2.5; 2.5; 2.5 TABLET ORAL at 12:06

## 2022-12-16 RX ADMIN — BUDESONIDE AND FORMOTEROL FUMARATE DIHYDRATE 2 PUFF: 80; 4.5 AEROSOL RESPIRATORY (INHALATION) at 09:06

## 2022-12-16 RX ADMIN — INSULIN GLARGINE 22 UNITS: 100 INJECTION, SOLUTION SUBCUTANEOUS at 09:06

## 2022-12-16 RX ADMIN — OXYCODONE HYDROCHLORIDE 20 MG: 10 TABLET ORAL at 04:36

## 2022-12-16 RX ADMIN — DEXTROAMPHETAMINE SACCHARATE, AMPHETAMINE ASPARTATE, DEXTROAMPHETAMINE SULFATE AND AMPHETAMINE SULFATE 20 MG: 2.5; 2.5; 2.5; 2.5 TABLET ORAL at 06:18

## 2022-12-16 RX ADMIN — HYDROMORPHONE HYDROCHLORIDE 0.2 MG: 0.2 INJECTION, SOLUTION INTRAMUSCULAR; INTRAVENOUS; SUBCUTANEOUS at 00:06

## 2022-12-16 RX ADMIN — INSULIN LISPRO 6 UNITS: 100 INJECTION, SOLUTION INTRAVENOUS; SUBCUTANEOUS at 12:06

## 2022-12-16 RX ADMIN — PREGABALIN 100 MG: 50 CAPSULE ORAL at 09:06

## 2022-12-16 RX ADMIN — BUPROPION HYDROCHLORIDE 300 MG: 150 TABLET, FILM COATED, EXTENDED RELEASE ORAL at 09:06

## 2022-12-16 RX ADMIN — SODIUM CHLORIDE 125 ML/HR: 0.9 INJECTION, SOLUTION INTRAVENOUS at 09:05

## 2022-12-16 RX ADMIN — SODIUM CHLORIDE 125 ML/HR: 0.9 INJECTION, SOLUTION INTRAVENOUS at 00:06

## 2022-12-16 RX ADMIN — LUBIPROSTONE 8 MCG: 8 CAPSULE, GELATIN COATED ORAL at 09:06

## 2022-12-16 RX ADMIN — PANTOPRAZOLE SODIUM 40 MG: 40 INJECTION, POWDER, FOR SOLUTION INTRAVENOUS at 09:07

## 2022-12-16 RX ADMIN — DIPHENHYDRAMINE HCL 25 MG: 25 TABLET, COATED ORAL at 06:25

## 2022-12-16 NOTE — ASSESSMENT & PLAN NOTE
· Stable  · Patient reports she feels some chest tightness on and off and had cold sweats over night  Reports one of her caregivers is pretty sick  · Tested positive for influenza A on respiratory panel today  · No wheezing or dyspnea on exam  Patient on room air sating 97%  · Continue home inhaler regimen of Symbicort and albuterol  · Patient would like to go home  Discussed with her any worsening symptoms such as difficulty breathing or needing her inhaler more often then prescribed she should come back to the ED

## 2022-12-16 NOTE — ASSESSMENT & PLAN NOTE
· Patient with chronic pain, followed by rheumatology, orthopedics, and pain management  · Chronically on opioids for management   · Mobility limited to wheelchair due to pain  · Continue home medications and lidocaine patches

## 2022-12-16 NOTE — PROGRESS NOTES
Progress Note - Salvador Alanis 46 y o  female MRN: [id number]    Unit/Bed#: E5 -01 Encounter: [id number]         Assessment/ Plan:  N/V  Gastroparesis  Constipation    Patient presented with nausea, dry heaves and epigastric pain, found to have mildly elevated lipase but no signs of pancreatitis on imaging  She does have a history of gastroparesis  She also was complaining of constipation  She was placed on Zofran, Reglan, PPI, Linzess and Colace  Underwent right upper quadrant ultrasound without signs of cholelithiasis  She was placed on clear liquid diet  Currently she states she is feeling well  She denies abdominal pain  She would like to increase her diet and hopefully be discharged  GI to sign off she can follow-up as an outpatient  Subjective:   Pt states she feels well  Tolerating clears  Objective:     Vitals: Blood pressure 144/80, pulse 71, temperature 99 °F (37 2 °C), resp  rate 20, weight 64 5 kg (142 lb 3 2 oz), SpO2 97 %, not currently breastfeeding  ,Body mass index is 26 01 kg/m²  Physical Exam: General appearance: alert and oriented, in no acute distress  Abdomen: soft, non-tender; bowel sounds normal; no masses,  no organomegaly    Invasive Devices     Peripheral Intravenous Line  Duration           Peripheral IV 12/15/22 Right Antecubital 1 day                Lab, Imaging and other studies: I have personally reviewed pertinent reports       CBC:   Lab Results   Component Value Date    WBC 6 64 12/16/2022    HGB 11 9 12/16/2022    HCT 35 6 12/16/2022    MCV 93 12/16/2022     12/16/2022    MCH 31 1 12/16/2022    MCHC 33 4 12/16/2022    RDW 13 3 12/16/2022    MPV 11 1 12/16/2022    NRBC 0 12/16/2022   ,   CMP:   Lab Results   Component Value Date    K 4 2 12/16/2022     12/16/2022    CO2 19 (L) 12/16/2022    BUN 32 (H) 12/16/2022    CREATININE 2 03 (H) 12/16/2022    CALCIUM 8 4 12/16/2022    AST 22 12/16/2022    ALT 19 12/16/2022    ALKPHOS 97 12/16/2022 EGFR 27 12/16/2022   ,   Lipase:   Lab Results   Component Value Date    LIPASE 497 (H) 12/16/2022   ,  PT/INR: No results found for: PT, INR,   Troponin: No results found for: TROPONINI,   Magnesium: No components found for: MAG,   Phosphorous: No results found for: PHOS

## 2022-12-16 NOTE — ASSESSMENT & PLAN NOTE
· Patient with 100 7 temperature at midnight 12/16 and complaints of infrequent chest tightness  · RSV/Flu/COVID panel ordered - Influenza A positive  · Patient is without myalgias, SOB, cough, or wheezing at this time  Afebrile  She would like to go home  · Patient was given fluids, and tylenol/duoneb were ordered prn  · Asthma treatment listed below  · Will prescribe renally dosed Tamiflu given symptoms started < 48 hours ago  30 mg once daily for a total of 5 days  Sent to pharmacy

## 2022-12-16 NOTE — DISCHARGE INSTR - AVS FIRST PAGE
You should start taking Tamiflu since you tested positive for Influenza A on 12/16/2022  It will be ONE pill a day for 5 days total    You are safe to continue your home medication regimens as prescribed  This includes your Colace, Protonix, Linzess, and Zofran  Please get blood work in 3 days, it was ordered for you  Follow up with your family doctor within 1 week  Also make an appointment with the gastrointestinal doctors  Any shortness of breath, difficulty breathing, or using your albuterol inhaler more than prescribed, you should come back to the hospital    Stay well hydrated at home and come back if unable to tolerate oral liquids/food/medication

## 2022-12-16 NOTE — ASSESSMENT & PLAN NOTE
· Chronic, patient recently received steroid injections 12/13/2022  · Does report having a medical marijuana card  · Continue to follow with orthopedics and pain management

## 2022-12-16 NOTE — ASSESSMENT & PLAN NOTE
· Patient blood pressures elevated with SBP > 180 at times  No history of primary hypertension, likely secondary to acute epigastric pain    · Hydralazine 10 mg IV for SBP > 160 available  · -140's today and stable

## 2022-12-16 NOTE — UTILIZATION REVIEW
Initial Clinical Review    Admission: Date/Time/Statement:   Admission Orders (From admission, onward)     Ordered        12/15/22 1425  INPATIENT ADMISSION  Once                      Orders Placed This Encounter   Procedures   • INPATIENT ADMISSION     Standing Status:   Standing     Number of Occurrences:   1     Order Specific Question:   Level of Care     Answer:   Med Surg [16]     Order Specific Question:   Estimated length of stay     Answer:   More than 2 Midnights     Order Specific Question:   Certification     Answer:   I certify that inpatient services are medically necessary for this patient for a duration of greater than two midnights  See H&P and MD Progress Notes for additional information about the patient's course of treatment  ED Arrival Information     Expected   -    Arrival   12/15/2022 08:50    Acuity   Urgent            Means of arrival   Ambulance    Escorted by   [facility] ([address])    Service   Hospitalist    Admission type   Emergency            Arrival complaint   Vomiting            Chief Complaint   Patient presents with   • Vomiting     Pt arrives via EMS vomiting since this morning  Thrashing around on stretcher spitting into emesis bag  Has not taken anything for her sx  Initial Presentation: 46 y o  female  Presents to ED via  EMS  From home  With nausea, vomiting and epigastric pain for past 3 days  Symptoms significantly  Worsened the day of admission with intractable vomiting  Patient's vomiting was controlled for period of time in the ED after receiving Zofran, Reglan, Zyprexa for about 4 hours, when she developed more pain and began to vomit again  Has chills and diaphoresis  With pain  Has not been able to tolerate  Any po  Pain  4/10  On ED exam, obviously uncomfortable   PMH  Is  Nonalcoholic acute pancreatitis,  Gastroparesis with  Gastric stimulator, chronic polyarthralgia, cervical radiculopathy, opioid dependence, DM2, microscopic polyangitis, CKD  Stage  4, Bipolar and schizoaffective disorder  Ct scan shows  No acute abnormality  Admit  IP with   Nausea with Vomiting, Gastroparesis and plan is  GI consult, monitor labs,  NPO, pain control, IVF, antiemetics,  IV pPI and continue home meds  GI consult  ( 12/15)     Multiple medical problems and presented with acute epigastric pain and nausea  Her symptoms may be due to recent acute pancreatitis but she does not technically meet the criteria for acute pancreatitis as her CT was unremarkable and her lipase is less than 3 times the upper limit of normal    She could have peptic ulcer disease, autonomic neuropathy from her diabetes, pain from her gastroparesis and delayed gastric emptying, or gallstones  We will check a right upper quadrant ultrasound to rule out gallstones and microlithiasis  Her opioid pain medication should be tapered rapidly over the next 24 hours because they may exacerbate her gastroparesis  Continue  zofran aas needed  Date:    12/16       Day 2:   U/S  RUQ shows no signs of cholelithiasis  Started on  Cl liq diet  Denies abdominal pain at present  Requesting  Diet increased  Complains of constipation  States she feels well at present  Continue current meds/treatment plan       ED Triage Vitals   Temperature Pulse Respirations Blood Pressure SpO2   12/15/22 0900 12/15/22 0900 12/15/22 0900 12/15/22 0900 12/15/22 0900   98 1 °F (36 7 °C) 88 16 (!) 203/113 100 %      Temp Source Heart Rate Source Patient Position - Orthostatic VS BP Location FiO2 (%)   12/15/22 0900 12/15/22 1137 12/15/22 1137 12/15/22 1137 --   Oral Monitor Lying Left arm       Pain Score       12/15/22 0900       10 - Worst Possible Pain          Wt Readings from Last 1 Encounters:   12/16/22 64 5 kg (142 lb 3 2 oz)     Additional Vital Signs:   99 °F (37 2 °C) 71 20 144/80 101 97 % -- --    12/16/22 00:08:18 100 7 °F (38 2 °C) Abnormal  56 17 121/72 88 93 % -- --   12/15/22 1918 -- -- -- -- -- 99 % None (Room air) --   12/15/22 15:38:02 98 °F (36 7 °C) 53 Abnormal  20 164/81 109 99 % None (Room air) Sitting   12/15/22 1415 -- 50 Abnormal  20 191/84 Abnormal  121 100 % None (Room air) Lying   12/15/22 1345 -- 66 18 188/88 Abnormal  126 100 % None (Room air) Lying   12/15/22 1245 -- 58 18 191/99 Abnormal  -- 97 % None (Room air) Lying   12/15/22 1215 -- 52 Abnormal  18 200/86 Abnormal  124 100 % None (Room air) Lying   12/15/22 1137 -- 50 Abnormal  22 239/102 Abnormal  146 100 % None (Room air) Lying   12/15/22 0956 -- -- -- -- -- -- None (Room air) --   12/15/22 0900 98 1 °F (36 7 °C) 88 16 203/113 Abnormal  -- 100 % None (Room air) --       Pertinent Labs/Diagnostic Test Results:   US right upper quadrant   Final Result by Emma Mcintosh MD (12/15 2218)      Echogenic right kidney, which can be seen in setting of renal parenchymal disease  Otherwise normal study  Workstation performed: [id number]         CT abdomen pelvis with contrast   Final Result by Martínez Lawrence MD (12/15 1412)      No acute abnormality in the abdomen or pelvis              Workstation performed: [id number]           Results from last 7 days   Lab Units 12/16/22  1038   SARS-COV-2  Negative     Results from last 7 days   Lab Units 12/16/22  0602 12/15/22  0901   WBC Thousand/uL 6 64 11 69*   HEMOGLOBIN g/dL 11 9 13 8   HEMATOCRIT % 35 6 40 7   PLATELETS Thousands/uL 178 240   NEUTROS ABS Thousands/µL 5 12 10 07*         Results from last 7 days   Lab Units 12/16/22  0602 12/15/22  0901   SODIUM mmol/L 141 142   POTASSIUM mmol/L 4 2 3 7   CHLORIDE mmol/L 106 108   CO2 mmol/L 19* 20*   ANION GAP mmol/L 16* 14*   BUN mg/dL 32* 36*   CREATININE mg/dL 2 03* 1 77*   EGFR ml/min/1 73sq m 27 32   CALCIUM mg/dL 8 4 9 9     Results from last 7 days   Lab Units 12/16/22  0602 12/15/22  0901   AST U/L 22 24   ALT U/L 19 24   ALK PHOS U/L 97 124*   TOTAL PROTEIN g/dL 7 0 8 7*   ALBUMIN g/dL 3 2* 4 2   TOTAL BILIRUBIN mg/dL 0 22 0 36   BILIRUBIN DIRECT mg/dL  --  0 15     Results from last 7 days   Lab Units 12/16/22  1128 12/16/22  0752 12/15/22  2039 12/15/22  1602   POC GLUCOSE mg/dl 126 81 150* 165*     Results from last 7 days   Lab Units 12/16/22  0602 12/15/22  0901   GLUCOSE RANDOM mg/dL 109 132               Results from last 7 days   Lab Units 12/16/22  0602 12/15/22  0901   LIPASE u/L 497* 989*                 Results from last 7 days   Lab Units 12/15/22  1222   CLARITY UA  Clear   COLOR UA  Yellow   SPEC GRAV UA  >=1 030   PH UA  5 5   GLUCOSE UA mg/dl Negative   KETONES UA mg/dl Trace*   BLOOD UA  Small*   PROTEIN UA mg/dl >=300*   NITRITE UA  Negative   BILIRUBIN UA  Negative   UROBILINOGEN UA E U /dl 0 2   LEUKOCYTES UA  Negative   WBC UA /hpf 0-1*   RBC UA /hpf 1-2*   BACTERIA UA /hpf Occasional   EPITHELIAL CELLS WET PREP /hpf Occasional     Results from last 7 days   Lab Units 12/16/22  1038   INFLUENZA A PCR  Positive*   INFLUENZA B PCR  Negative   RSV PCR  Negative                 ED Treatment:   Medication Administration from 12/15/2022 0850 to 12/15/2022 1527       Date/Time Order Dose Route Action Comments     12/15/2022 0905 EST OLANZapine (ZyPREXA) IM injection 10 mg 10 mg Intramuscular Given --     12/15/2022 0901 EST ondansetron (ZOFRAN) injection 4 mg 4 mg Intravenous Given --     12/15/2022 0901 EST Famotidine (PF) (PEPCID) injection 20 mg 20 mg Intravenous Given --     12/15/2022 0905 EST sterile water injection 2 1 mL 2 1 mL Injection Given --     12/15/2022 1041 EST sodium chloride 0 9 % bolus 1,000 mL 1,000 mL Intravenous New Bag --     12/15/2022 1115 EST morphine injection 2 mg 2 mg Intravenous Given --     12/15/2022 1116 EST metoclopramide (REGLAN) injection 10 mg 10 mg Intravenous Given --     12/15/2022 1202 EST hydrALAZINE (APRESOLINE) injection 5 mg 5 mg Intravenous Given --     12/15/2022 1304 EST iodixanol (VISIPAQUE) 320 MG/ML injection 100 mL 100 mL Intravenous Given --     12/15/2022 1343 EST HYDROmorphone (DILAUDID) injection 0 5 mg 0 5 mg Intravenous Given --        Present on Admission:  • Asthma  • Bipolar 1 disorder ([facility])  • Continuous opioid dependence ([facility] [address] )  • Chronic kidney disease, stage 4 (severe) ([facility])  • Schizo-affective schizophrenia ([facility])  • Cervical radiculopathy  • MPA (microscopic polyangiitis) ([facility])      Admitting Diagnosis: Pancreatitis [K85 90]  Vomiting [R11 10]  Bilateral sacroiliitis ([facility]) [M46 1]  Age/Sex: 46 y o  female  Admission Orders:  Scheduled Medications:  amphetamine-dextroamphetamine, 20 mg, Oral, BID before breakfast/lunch  budesonide-formoterol, 2 puff, Inhalation, BID  buPROPion, 300 mg, Oral, Daily  docusate sodium, 100 mg, Oral, BID  heparin (porcine), 5,000 Units, Subcutaneous, Q8H [address]  insulin glargine, 22 Units, Subcutaneous, QAM  insulin lispro, 1-5 Units, Subcutaneous, TID AC  insulin lispro, 1-5 Units, Subcutaneous, HS  insulin lispro, 6 Units, Subcutaneous, TID With Meals  lubiprostone, 8 mcg, Oral, BID With Meals  oseltamivir, 30 mg, Oral, Q24H  pantoprazole, 40 mg, Intravenous, Q12H PAULINE  pregabalin, 100 mg, Oral, TID  pregabalin, 25 mg, Oral, BID  tiZANidine, 4 mg, Oral, TID      Continuous IV Infusions:  sodium chloride, 125 mL/hr, Intravenous, Continuous      PRN Meds:  acetaminophen, 650 mg, Oral, Q6H PRN  albuterol, 2 puff, Inhalation, Q6H PRN  haloperidol, 5 mg, Oral, Daily PRN  hydrALAZINE, 10 mg, Intravenous, Q6H PRN  HYDROmorphone, 0 2 mg, Intravenous, Q3H PRN   ( x1  12/15 and  X 1  12/16 thus far)  ipratropium-albuterol, 3 mL, Nebulization, Q6H PRN  lidocaine, 1 patch, Topical, BID PRN  metoclopramide, 10 mg, Intravenous, Q6H PRN  ( x1  12/15)   ondansetron, 4 mg, Intravenous, Q6H PRN ( X1  12/15)    oxyCODONE, 20 mg, Oral, Q4H PRN  oxyCODONE, 15 mg, Oral, Q4H PRN        IP CONSULT TO GASTROENTEROLOGY    Network Utilization Review Department  ATTENTION: Please call with any questions or concerns to [phone] and carefully listen to the prompts so that you are directed to the right person  All voicemails are confidential   [facility] all requests for admission clinical reviews, approved or denied determinations and any other requests to dedicated fax number below belonging to the campus where the patient is receiving treatment   List of dedicated fax numbers for the Facilities:  [address] DENIALS (Administrative/Medical Necessity) [phone]   [address] (Maternity/NICU/Pediatrics) [phone]   [address] [phone]   [facility] [place] [address] [phone]   [address] [address] [place] [address] [phone]   [facility] [facility] [address] [facility] [phone]

## 2022-12-16 NOTE — ASSESSMENT & PLAN NOTE
· Patient reports chronic opioid dependence to to history of cervical radiculopathy, polyarthralgia, and neuropathy     · Patient chronically on 15 mg oxycodone daily q4 hours PRN for pain control of these conditions   · PDMP reviewed   · Followed outpatient by pain management, rheumatology, and orthopedics  · Safe to continue home medications as prescribed

## 2022-12-16 NOTE — OCCUPATIONAL THERAPY NOTE
Occupational Therapy Evaluation     Patient Name: Lennox Marcial  Today's Date: 12/16/2022  Problem List  Principal Problem:    Nausea with vomiting  Active Problems:    Type 2 diabetes mellitus with stage 3 chronic kidney disease, with long-term current use of insulin (HCC)    MPA (microscopic polyangiitis) (HCC)    Asthma    Bipolar 1 disorder (HCC)    Gastroparesis    Schizo-affective schizophrenia ([facility] [address] )    Cervical radiculopathy    Continuous opioid dependence (HCC)    Chronic kidney disease, stage 4 (severe) (HCC)    Polyarthritis    Other secondary hypertension    Influenza A    Past Medical History  Past Medical History:   Diagnosis Date    ADHD     Anemia of chronic disease     Anxiety     Arthritis ? Asthma     Bipolar disorder ([facility] [address] )     Borderline personality disorder ([facility] [address] )     Cataplexy     Chronic abdominal pain     Chronic kidney disease ? CKD (chronic kidney disease) stage 3, GFR 30-59 ml/min (HCC)     Cushing syndrome ([facility] [address] )     Depression ?     Diabetes mellitus ([facility] [address] )     DVT (deep venous thrombosis) ([facility])     GERD (gastroesophageal reflux disease)     Headache(784 0) 3 months    History of acute pancreatitis     felt secondary to Bactrim    History of transfusion     Hypertension     Liver disease     fatty liver    Microscopic polyangiitis (HCC)     Ovarian cyst     PTSD (post-traumatic stress disorder)     Self-inflicted injury     self inflicted skin wounds    Sleep apnea     Wegener's granulomatosis with renal involvement ([facility] [address] ) 2015     Past Surgical History  Past Surgical History:   Procedure Laterality Date    ESOPHAGOGASTRODUODENOSCOPY  09/11/2015    mild antral gastritis    GASTRIC STIMULATOR IMPLANT SURGERY  06/25/2020    AK COLONOSCOPY FLX DX W/COLLJ SPEC WHEN PFRMD N/A 12/14/2018    adenoma removed from the transverse, hyperplastic polyp removed from the left colon    AK ESOPHAGOGASTRODUODENOSCOPY TRANSORAL DIAGNOSTIC N/A 12/14/2018    gastritis and scant coffee-ground material  Biopsies negative for H  pylori    DE OPEN TX RADIAL & ULNAR SHAFT FX FIX RADIUS AND ULNA Left 6/23/2022    Procedure: OPEN REDUCTION W/ INTERNAL FIXATION (ORIF) RADIUS / ULNA (WRIST); Surgeon: Kourtney Hernandez MD;  Location: [facility];  Service: Orthopedics    RELEASE SCAR CONTRACTURE / GRAFT REPAIRS OF HAND Bilateral     UPPER GASTROINTESTINAL ENDOSCOPY  12/26/2019    Dr Rowan Barboza  Botox to the pylorus           12/16/22 1035   OT Last Visit   OT Visit Date 12/16/22   Note Type   Note type Evaluation   Pain Assessment   Pain Assessment Tool 0-10   Pain Score No Pain   Restrictions/Precautions   Weight Bearing Precautions Per Order No   Other Precautions Chair Alarm; Bed Alarm;Multiple lines; Fall Risk  (pt w/ narcolepsy & cataplexy)   Home Living   Type of Home Apartment   Home Layout One level;Ramped entrance;Elevator   Bathroom Shower/Tub Walk-in shower   Bathroom Toilet Standard   Bathroom Equipment Grab bars in shower; Shower chair; Toilet raiser   [address]; Wheelchair-manual;Wheelchair-electric;Grab bars; Other (Comment)  (3 wheeled walker)   Additional Comments Pt lives alone in a one level apt with ramped entrance and elevator access  Pt typically has HHAs 7AM-11PM, however currently only has 7AM-3PM covered  Pt reports primarily staying in bed if HHA not present 2* fall risk  Prior Function   Level of [place] Needs assistance with ADLs; Needs assistance with functional mobility; Needs assistance with IADLS   Lives With Alone   Receives Help From Personal care attendant   IADLs Family/Friend/Other provides transportation; Family/Friend/Other provides meals; Family/Friend/Other provides medication management   Falls in the last 6 months >10   Vocational Retired   Comments At baseline, pt required Supervision for ADLs and Supervision/assistance for functional transfers/mobility   Pt reports use of 3 wheeled walker vs furniture walking for household mobility and use of W/C in community  HHAs perform IADLs  (-)   +Falls PTA   Lifestyle   Autonomy At baseline, pt required Supervision for ADLs and Supervision/assistance for functional transfers/mobility  Pt reports use of 3 wheeled walker vs furniture walking for household mobility and use of W/C in community  HHAs perform IADLs  (-)   +Falls PTA   Reciprocal Relationships Brother, As   Service to Others Retired   Intrinsic Gratification Watching TV   ADL   Where Assessed Edge of bed   Eating Assistance 7  [address] 4  Minimal Assistance   Bed Mobility   Supine to Sit 5  Supervision   Additional items HOB elevated; Bedrails; Increased time required;Verbal cues   Sit to Supine 5  Supervision   Additional items Increased time required;Verbal cues   Additional Comments Pt lying supine with bed alarm activated at end of session  Call bell and phone within reach  All needs met and pt reports no further questions for OT at this time  Transfers   Sit to Stand 4  Minimal assistance   Additional items Assist x 1;Bedrails; Increased time required;Verbal cues   Stand to Sit 4  Minimal assistance   Additional items Assist x 1; Increased time required;Verbal cues   Additional Comments Cues for safe technique and hand placement   Functional Mobility   Functional Mobility 4  Minimal assistance   Additional Comments Assist x1; Laterally stepping towards HOB   Additional items Rolling walker   Balance   Static Sitting Fair   Dynamic Sitting Fair -   Static Standing Fair -   Dynamic Standing Poor +   Ambulatory Poor +   Activity Tolerance   Activity Tolerance Patient tolerated treatment well;Treatment limited secondary to medical complications (Comment)   Medical Staff Made Aware Pasha, PT   Nurse Made Aware yes; Nehemias Mary, RN   RUE Assessment   RUE Assessment X  (limited shldr ROM 2* pain (80-90*); Elbow-distal=WFL)   RUE Strength   R Shoulder Flexion 3+/5   R Shoulder Extension 3+/5   R Elbow Flexion 4-/5   R Elbow Extension 4-/5   LUE Assessment   LUE Assessment WFL  (4-/5 throughout)   Hand Function   Gross Motor Coordination Functional   Fine Motor Coordination Functional   Sensation   Light Touch Partial deficits in the LUE;Partial deficits in the RUE;Partial deficits in the RLE;Partial deficits in the LLE   Proprioception   Proprioception No apparent deficits   Vision-Basic Assessment   Current Vision Wears glasses all the time  (not present during eval)   Vision - Complex Assessment   Ocular Range of Motion Intact   Acuity Able to read clock/calendar on wall without difficulty; Able to read employee name badge without difficulty   Psychosocial   Psychosocial (WDL) WDL   Perception   Inattention/Neglect Appears intact   Cognition   Overall Cognitive Status WFL   Arousal/Participation Alert; Cooperative   Attention Attends with cues to redirect   Orientation Level Oriented X4   Memory Within functional limits   Following Commands Follows one step commands without difficulty   Comments Pleasant and cooperative   Assessment   Prognosis Good   Assessment Pt is a 46 y o  female seen for OT evaluation s/p adm to [address] on 12/15/2022 w/ Nausea with vomiting   Comorbidities affecting pt’s functional performance include a significant PMH of ADHD, Anxiety, Arthritis, Asthma, Bipolar disorder, Cataplexy, CKD, Depression, DM, DVT, HTN, PTSD  Pt with active OT orders and activity orders for Activity as tolerated  Pt lives alone in a one level apt with ramped entrance and elevator access  Pt typically has HHAs 7AM-11PM, however currently only has 7AM-3PM covered  Pt reports primarily staying in bed if HHA not present 2* fall risk   At baseline, pt required Supervision for ADLs and Supervision/assistance for functional transfers/mobility  Pt reports use of 3 wheeled walker vs furniture walking for household mobility and use of W/C in community  HHAs perform IADLs  (-)   +Falls PTA  Upon evaluation, pt currently requires Supervision for UB ADLs, Min A for LB ADLs, Supervision for toileting, Supervision for bed mobility, and Min A for functional mobility/transfers 2* the following deficits impacting occupational performance: decreased ROM, decreased strength, decreased balance, decreased tolerance and impaired sensation and personal factors of: Fall risk, limited home support and difficulty performing ADLS  These impairments, however, do not limit pt’s ability to safely engage in all baseline areas of occupation  No further acute OT needs identified at this time  Recommend continued mobilization with hospital staff while in the hospital to increase pt’s endurance and strength upon D/C  From OT standpoint, recommend D/C home with continued OPPT and social support when medically cleared  D/C pt from OT caseload at this time  Goals   Patient Goals To go home   Plan   OT Frequency Eval only  (D/C OT)   Recommendation   OT Discharge Recommendation Home with outpatient rehabilitation   Additional Comments  The patient's raw score on the AM-PAC Daily Activity inpatient short form is 20, standardized score is 42 03, greater than 39 4  Patients at this level are likely to benefit from discharge to home  Please refer to the recommendation of the Occupational Therapist for safe discharge planning     AM-PAC Daily Activity Inpatient   Lower Body Dressing 3   Bathing 3   Toileting 3   Upper Body Dressing 3   Grooming 4   Eating 4   Daily Activity Raw Score 20   Daily Activity Standardized Score (Calc for Raw Score >=11) 42 03   AM-[facility] Applied Cognition Inpatient   Following a Speech/Presentation 4   Understanding Ordinary Conversation 4   Taking Medications 4 Remembering Where Things Are Placed or Put Away 4   Remembering List of 4-5 Errands 3   Taking Care of Complicated Tasks 3   Applied Cognition Raw Score 22   Applied Cognition Standardized Score 47 83       Sapna Dumont, OTR/L

## 2022-12-16 NOTE — ASSESSMENT & PLAN NOTE
Lab Results   Component Value Date    EGFR 27 12/16/2022    EGFR 32 12/15/2022    EGFR 32 10/17/2022    CREATININE 2 03 (H) 12/16/2022    CREATININE 1 77 (H) 12/15/2022    CREATININE 1 78 (H) 10/17/2022     · POA creatinine 1 77, which appears to be approximately the patients baseline  · Slightly elevated today  Patient tolerating liquids well  Does not appear dehydrated  · Will repeat CMP for patient to get in 3 days   She is aware and agreeable  · Referral for follow up with PCP placed and discussed with patient as well

## 2022-12-16 NOTE — PLAN OF CARE
Problem: Potential for Falls  Goal: Patient will remain free of falls  Description: INTERVENTIONS:  - Educate patient/family on patient safety including physical limitations  - Instruct patient to call for assistance with activity   - Consult OT/PT to assist with strengthening/mobility   - Keep Call bell within reach  - Keep bed low and locked with side rails adjusted as appropriate  - Keep care items and personal belongings within reach  - Initiate and maintain comfort rounds  - Make Fall Risk Sign visible to staff  - Offer Toileting every 2 Hours, in advance of need  - Initiate/Maintain bed alarm  - Apply yellow socks and bracelet for high fall risk patients  - Consider moving patient to room near nurses station  Outcome: Progressing     Problem: PAIN - ADULT  Goal: Verbalizes/displays adequate comfort level or baseline comfort level  Description: Interventions:  - Encourage patient to monitor pain and request assistance  - Assess pain using appropriate pain scale  - Administer analgesics based on type and severity of pain and evaluate response  - Implement non-pharmacological measures as appropriate and evaluate response  - Consider cultural and social influences on pain and pain management  - Notify physician/advanced practitioner if interventions unsuccessful or patient reports new pain  Outcome: Progressing     Problem: INFECTION - ADULT  Goal: Absence or prevention of progression during hospitalization  Description: INTERVENTIONS:  - Assess and monitor for signs and symptoms of infection  - Monitor lab/diagnostic results  - Monitor all insertion sites, i e  indwelling lines, tubes, and drains  - Monitor endotracheal if appropriate and nasal secretions for changes in amount and color  - [place] appropriate cooling/warming therapies per order  - Administer medications as ordered  - Instruct and encourage patient and family to use good hand hygiene technique  - Identify and instruct in appropriate isolation precautions for identified infection/condition  Outcome: Progressing     Problem: DISCHARGE PLANNING  Goal: Discharge to home or other facility with appropriate resources  Description: INTERVENTIONS:  - Identify barriers to discharge w/patient and caregiver  - Arrange for needed discharge resources and transportation as appropriate  - Identify discharge learning needs (meds, wound care, etc )  - Arrange for interpretive services to assist at discharge as needed  - Refer to Case Management Department for coordinating discharge planning if the patient needs post-hospital services based on physician/advanced practitioner order or complex needs related to functional status, cognitive ability, or social support system  Outcome: Progressing     Problem: Knowledge Deficit  Goal: Patient/family/caregiver demonstrates understanding of disease process, treatment plan, medications, and discharge instructions  Description: Complete learning assessment and assess knowledge base    Interventions:  - Provide teaching at level of understanding  - Provide teaching via preferred learning methods  Outcome: Progressing     Problem: GASTROINTESTINAL - ADULT  Goal: Minimal or absence of nausea and/or vomiting  Description: INTERVENTIONS:  - Administer IV fluids if ordered to ensure adequate hydration  - Maintain NPO status until nausea and vomiting are resolved  - Nasogastric tube if ordered  - Administer ordered antiemetic medications as needed  - Provide nonpharmacologic comfort measures as appropriate  - Advance diet as tolerated, if ordered  - Consider nutrition services referral to assist patient with adequate nutrition and appropriate food choices  Outcome: Progressing  Goal: Maintains or returns to baseline bowel function  Description: INTERVENTIONS:  - Assess bowel function  - Encourage oral fluids to ensure adequate hydration  - Administer IV fluids if ordered to ensure adequate hydration  - Administer ordered medications as needed  - Encourage mobilization and activity  - Consider nutritional services referral to assist patient with adequate nutrition and appropriate food choices  Outcome: Progressing     Problem: MOBILITY - ADULT  Goal: Maintain or return to baseline ADL function  Description: INTERVENTIONS:  -  Assess patient's ability to carry out ADLs; assess patient's baseline for ADL function and identify physical deficits which impact ability to perform ADLs (bathing, care of mouth/teeth, toileting, grooming, dressing, etc )  - Assess/evaluate cause of self-care deficits   - Assess range of motion  - Assess patient's mobility; develop plan if impaired  - Assess patient's need for assistive devices and provide as appropriate  - Encourage maximum independence but intervene and supervise when necessary  - Involve family in performance of ADLs  - Assess for home care needs following discharge   - Consider OT consult to assist with ADL evaluation and planning for discharge  - Provide patient education as appropriate  Outcome: Progressing  Goal: Maintains/Returns to pre admission functional level  Description: INTERVENTIONS:  - Perform BMAT or MOVE assessment daily    - Set and communicate daily mobility goal to care team and patient/family/caregiver  - Collaborate with rehabilitation services on mobility goals if consulted  - Perform Range of Motion 3 times a day  - Reposition patient every 2 hours    - Dangle patient 3 times a day  - Stand patient 3 times a day  - Ambulate patient 3 times a day  - Out of bed to chair 3 times a day   - Out of bed for meals 3 times a day  - Out of bed for toileting  - Record patient progress and toleration of activity level   Outcome: Progressing

## 2022-12-16 NOTE — ASSESSMENT & PLAN NOTE
· Patient reports she is feeling well   No change in her mood, appetite, or sleep  · Continue wellbutrin and adderall

## 2022-12-16 NOTE — UTILIZATION REVIEW
NOTIFICATION OF INPATIENT ADMISSION   AUTHORIZATION REQUEST   SERVICING FACILITY:   [address] [place] [address], [address]   Tax ID: [id number]  NPI: [id number] ATTENDING PROVIDER:  Attending Name and NPI#: Wanna Closs [[id number]]  Address: [address] [place] [address], [address] [facility]  Phone: [phone]     ADMISSION INFORMATION:  Place of Service: Inpatient [facility]  Place of Service Code: 21  Inpatient Admission Date/Time: 12/15/22  2:25 PM  Discharge Date/Time: No discharge date for patient encounter  Admitting Diagnosis Code/Description:  Pancreatitis [K85 90]  Vomiting [R11 10]  Bilateral sacroiliitis ([facility] [address] ) [M46 1]     UTILIZATION REVIEW CONTACT:  Carlos Eduardo Noland Utilization   Network Utilization Review Department  Phone: [phone]  Fax: [phone]  Email: [email or web address]  Contact for approvals/pending authorizations, clinical reviews, and discharge  PHYSICIAN ADVISORY SERVICES:  Medical Necessity Denial & [email or web address] Review  Phone: [phone]  Fax: [phone]  Email: [email or web address]

## 2022-12-16 NOTE — PHYSICAL THERAPY NOTE
PT EVALUATION    Pt  Name: Linda Vieira  Pt  Age: 46 y o  MRN: [id number]  LENGTH OF STAY: 1      Admitting Diagnoses:   Pancreatitis [K85 90]  Vomiting [R11 10]  Bilateral sacroiliitis (HCC) [M46 1]    Past Medical History:   Diagnosis Date    ADHD     Anemia of chronic disease     Anxiety     Arthritis ? Asthma     Bipolar disorder ([place] [address] )     Borderline personality disorder ([place] [address] )     Cataplexy     Chronic abdominal pain     Chronic kidney disease ? CKD (chronic kidney disease) stage 3, GFR 30-59 ml/min (HCC)     Cushing syndrome ([place] [address] )     Depression ? Diabetes mellitus ([place] [address] )     DVT (deep venous thrombosis) (HCC)     GERD (gastroesophageal reflux disease)     Headache(784 0) 3 months    History of acute pancreatitis     felt secondary to Bactrim    History of transfusion     Hypertension     Liver disease     fatty liver    Microscopic polyangiitis (HCC)     Ovarian cyst     PTSD (post-traumatic stress disorder)     Self-inflicted injury     self inflicted skin wounds    Sleep apnea     Wegener's granulomatosis with renal involvement ([place] [address] ) 2015       Past Surgical History:   Procedure Laterality Date    ESOPHAGOGASTRODUODENOSCOPY  09/11/2015    mild antral gastritis    GASTRIC STIMULATOR IMPLANT SURGERY  06/25/2020    [place] COLONOSCOPY FLX DX W/COLLJ SPEC WHEN PFRMD N/A 12/14/2018    adenoma removed from the transverse, hyperplastic polyp removed from the left colon    KY ESOPHAGOGASTRODUODENOSCOPY TRANSORAL DIAGNOSTIC N/A 12/14/2018    gastritis and scant coffee-ground material   Biopsies negative for H  pylori    KY OPEN TX RADIAL & ULNAR SHAFT FX FIX RADIUS AND ULNA Left 6/23/2022    Procedure: OPEN REDUCTION W/ INTERNAL FIXATION (ORIF) RADIUS / ULNA (WRIST); Surgeon: Gab Allred MD;  Location: [facility];  Service: Orthopedics    RELEASE SCAR CONTRACTURE / GRAFT REPAIRS OF HAND Bilateral     UPPER GASTROINTESTINAL ENDOSCOPY  12/26/2019    Dr Beto Vincent to the pylorus       Imaging Studies:  US right upper quadrant   Final Result by Royal Gutiérrez MD (12/15 2218)      Echogenic right kidney, which can be seen in setting of renal parenchymal disease  Otherwise normal study  Workstation performed: [id number]         CT abdomen pelvis with contrast   Final Result by Brandon Agosto MD (12/15 7712)      No acute abnormality in the abdomen or pelvis  Workstation performed: [id number]               12/16/22 1036   PT Last Visit   PT Visit Date 12/16/22   Note Type   Note type Evaluation   Pain Assessment   Pain Score No Pain   Restrictions/Precautions   Weight Bearing Precautions Per Order No   Other Precautions Chair Alarm; Bed Alarm;Multiple lines; Fall Risk  (pt w/ narcolepsy & cataplexy)   Home Living   Type of Home Apartment   Home Layout One level;Ramped entrance;Elevator   Bathroom Shower/Tub Walk-in shower   Bathroom Toilet Standard   Bathroom Equipment Grab bars in shower; Shower chair; Toilet raiser   [address] [facility]; Wheelchair-manual;Wheelchair-electric; Other (Comment);Grab bars  (3 wheeled walker)   Prior Function   Level of [place] Needs assistance with ADLs; Needs assistance with functional mobility   Lives With Alone   Receives Help From Personal care attendant  (8 hrs daily)   Falls in the last 6 months >10   Comments (-) ; pt reports she that she "furniture walk" w/ assistance/supervision at baseline; uses w/c for community, requires assistance for w/c mobility   General   Family/Caregiver Present No   Cognition   Overall Cognitive Status WFL   Arousal/Participation Alert   Orientation Level Oriented X4   Following Commands Follows one step commands without difficulty   Comments pleasant & cooperative   Subjective   Subjective Pt agreeable to PT/OT evals     RUE Assessment   RUE Assessment   (refer to OT)   LUE Assessment   LUE Assessment   (refer to OT)   RLE Assessment   RLE Assessment X  (3-/5 grossly)   LLE Assessment   LLE Assessment X  (3-/5 grossly)   Coordination   Movements are Fluid and Coordinated 1   Sensation X   Bed Mobility   Supine to Sit 5  Supervision   Additional items HOB elevated; Bedrails; Increased time required;Verbal cues   Sit to Supine 5  Supervision   Additional items Increased time required;Verbal cues   Additional Comments cues for techniques & safety   Transfers   Sit to Stand 4  Minimal assistance   Additional items Assist x 1; Increased time required;Verbal cues   Stand to Sit 4  Minimal assistance   Additional items Assist x 1; Increased time required;Verbal cues   Additional Comments cues for techniques & safety   Ambulation/Elevation   Gait pattern Decreased foot clearance;Narrow MARCIN;Shuffling; Short stride; Excessively slow; Step to   Gait Assistance 4  Minimal assist   Additional items Assist x 1;Verbal cues; Tactile cues   Assistive Device Rolling walker   Distance 3'x1 lateral steps to [facility]   Ambulation/Elevation Additional Comments no gross LOB noted; limit amb for safety as pt w/ narcolepsy & cataplexy   Balance   Static Sitting Good   Dynamic Sitting Fair +   Static Standing Fair -   Dynamic Standing Poor +   Ambulatory Poor +   Activity Tolerance   Activity Tolerance Treatment limited secondary to medical complications (Comment)   Medical Staff Made Aware Mark Aggarwal   Nurse Made Aware yes   Assessment   Prognosis Guarded   Problem List Decreased strength;Decreased endurance; Impaired balance;Decreased mobility; Impaired sensation   Assessment Pt 46 y o  female presented w/ c/o nausea, vomiting & epigastric pain  Pt admitted for possible acute pancreatitis episode  Pt referred to PT for mobility assessment & D/C planning  Please see flowsheet above re: home set-up, PLOF & objective findings during PT assessment  PTA, pt requires assistance/ supervision w/ overall functional mobility & ADL's for safety as pt w/ narcolepsy & cataplexy  On eval, pt demonstrates no significant decline in function to warrant skilled PT at this time  Pt requires S for bed mobility & minAx1 w/ transfers & amb w/ RW  Pt states being at her functional baseline and states no concerns about going back home when medically cleared  The patient's AM-PAC Basic Mobility Inpatient Short Form Raw Score is 17  A Raw score of greater than 16 suggests the patient may benefit from discharge to home  Please also refer to the recommendation of the Physical Therapist for safe discharge planning  From PT standpoint, pt may return home when medically cleared  Will D/C PT  Nsg staff to continue to mobilized pt w/ assistance to prevent decline in function  Nsg notified  Co-eval was necessary to complete this PT eval for the pt's best interest given pt's medical acuity & complexity  Goals   Patient Goals to go home   PT Treatment Day 0   Plan   Treatment/Interventions Spoke to nursing;OT  (PT eval only)   PT Frequency Other (Comment)  (D/C PT)   Recommendation   PT Discharge Recommendation No rehabilitation needs   Equipment Recommended Wheelchair;Walker  (pt has at home)   [place] [address] in Bed Without Bedrails 4   Lying on Back to Sitting on Edge of Flat Bed 3   Moving Bed to Chair 3   Standing Up From Chair 3   Walk in Room 3   Climb 3-5 Stairs 1   Basic Mobility Inpatient Raw Score 17   Basic Mobility Standardized Score 39 67   Highest Level Of Mobility   JH-HLM Goal 5: Stand one or more mins   JH-HLM Achieved 5: Stand (1 or more minutes)   End of Consult   Patient Position at End of Consult Supine;Bed/Chair alarm activated; All needs within reach   End of Consult Comments Pt in stable condition  All needs in reach  All lines intact  Bed alarm activated     Hx/personal factors: co-morbidities, mutliple lines, use of AD, h/o of falls, fall risk, and assist w/ ADL's  Examination: baseline dec mobility, dec balance, dec endurance, dec amb, risk for falls  Clinical: unpredictable (ongoing medical status and abnormal lab values)  Complexity: high    Pasha Cabral

## 2022-12-16 NOTE — DISCHARGE SUMMARY
B[facility] 48  Discharge- Jeanne Melchor 1970, 46 y o  female MRN: [id number]  Unit/Bed#: E5 -01 Encounter: [id number]  Primary Care Provider: Bonifacio Alejandra PA-C   Date and time admitted to hospital: 12/15/2022  8:50 AM    * Nausea with vomiting  Assessment & Plan  · Patient presented to the ED on 12/16 with N/V x 3 days and severe epigastric pain  · Patient provided with 5 mg of Haldol per her family medicine doctor to prevent ER visits for this severe N/V and abd pain  Zyprexa was administered in the ED  · Elevated lipase 989 on admission  Patient was NPO and given NSS at 125 cc/hour   · CT abd/ pelvis demonstrated "no acute abnormality in the abdomen or pelvis"  · Seen by GI - does not fit the criteria for acute pancreatitis however she does have a history of nonalcoholic pancreatitis  · N/V could be multifactorial due to potential PUD, autonomic neuropathy from her diabetes, pain from her gastroparesis, and delayed gastric emptying  Patient does have gastric stimulator in place  · Lipase 497 today  Transitioned to clear liquids this AM and tolerating well  · Patient is hungry  GI cleared pt  Advanced to diabetic diet and patient tolerated well without N/V or abdominal pain  · Patient is ready to go home  She was educated to continue her medications and come back to the ED if she is unable to tolerate oral liquids/food/medications  Gastroparesis  Assessment & Plan  · History of gastroparesis, has been stable after placement of gastric stimulator, previously followed by [facility]  · Continue outpatient medications  · Patient without evidence of sudden worsening of gastroparesis in the setting of new N/V  • GI outpatient follow up placed  Patient may benefit from surgical pyloroplasty per GI      Influenza A  Assessment & Plan  · Patient with 100 7 temperature at midnight 12/16 and complaints of infrequent chest tightness  · RSV/Flu/COVID panel ordered - Influenza A positive  · Patient is without myalgias, SOB, cough, or wheezing at this time  Afebrile  She would like to go home  · Patient was given fluids, and tylenol/duoneb were ordered prn  · Asthma treatment listed below  · Will prescribe renally dosed Tamiflu given symptoms started < 48 hours ago  30 mg once daily for a total of 5 days  Sent to pharmacy  Asthma  Assessment & Plan  · Stable  · Patient reports she feels some chest tightness on and off and had cold sweats over night  Reports one of her caregivers is pretty sick  · Tested positive for influenza A on respiratory panel today  · No wheezing or dyspnea on exam  Patient on room air sating 97%  · Continue home inhaler regimen of Symbicort and albuterol  · Patient would like to go home  Discussed with her any worsening symptoms such as difficulty breathing or needing her inhaler more often then prescribed she should come back to the ED  Type 2 diabetes mellitus with stage 3 chronic kidney disease, with long-term current use of insulin [facility])  Assessment & Plan  Lab Results   Component Value Date    HGBA1C 5 7 02/28/2022       Recent Labs     12/15/22  1602 12/15/22  2039 12/16/22  0752   POCGLU 165* 150* 81       Blood Sugar Average: Last 72 hrs:  · (P) 132   · Continue home regimen of Lantus 22 units and lispro 6 units TID with meals  · Low carbohydrate diet at home    Chronic kidney disease, stage 4 (severe) [facility])  Assessment & Plan  Lab Results   Component Value Date    EGFR 27 12/16/2022    EGFR 32 12/15/2022    EGFR 32 10/17/2022    CREATININE 2 03 (H) 12/16/2022    CREATININE 1 77 (H) 12/15/2022    CREATININE 1 78 (H) 10/17/2022     · POA creatinine 1 77, which appears to be approximately the patients baseline  · Slightly elevated today  Patient tolerating liquids well  Does not appear dehydrated  · Will repeat CMP for patient to get in 3 days   She is aware and agreeable  · Referral for follow up with PCP placed and discussed with patient as well    Cervical radiculopathy  Assessment & Plan  · Chronic, patient recently received steroid injections 12/13/2022  · Does report having a medical marijuana card  · Continue to follow with orthopedics and pain management    Polyarthritis  Assessment & Plan  · Patient with chronic pain, followed by rheumatology, orthopedics, and pain management  · Chronically on opioids for management   · Mobility limited to wheelchair due to pain  · Continue home medications and lidocaine patches     Continuous opioid dependence ([facility] [address] )  Assessment & Plan  · Patient reports chronic opioid dependence to to history of cervical radiculopathy, polyarthralgia, and neuropathy  · Patient chronically on 15 mg oxycodone daily q4 hours PRN for pain control of these conditions   · PDMP reviewed   · Followed outpatient by pain management, rheumatology, and orthopedics  · Safe to continue home medications as prescribed    MPA (microscopic polyangiitis) (HCC)  Assessment & Plan  · Stable, chronic  · Followed outpatient by rheumatology    Other secondary hypertension  Assessment & Plan  · Patient blood pressures elevated with SBP > 180 at times  No history of primary hypertension, likely secondary to acute epigastric pain  · Hydralazine 10 mg IV for SBP > 160 available  · -140's today and stable    Bipolar 1 disorder ([facility] [address] )  Assessment & Plan  · Patient reports she is feeling well   No change in her mood, appetite, or sleep  · Continue wellbutrin and adderall    Schizo-affective schizophrenia [facility])  Assessment & Plan  · Continue wellbutrin and adderall     Medical Problems     Resolved Problems  Date Reviewed: 12/16/2022   None       Discharging Physician / Practitioner: Fanny Riedel, PA-C  PCP: Talat Mcqueen PA-C  Admission Date:   Admission Orders (From admission, onward)     Ordered        12/15/22 1425  [address] [place] [address],[address] [place]  Once                      Discharge Date: 12/16/22    [facility] Stay:  · Gastroenterology    Procedures Performed:   · None    Significant Findings / Test Results:   · Influenza A positive on respiratory panel 12/16    Incidental Findings:   · Right upper quadrant US 12/15- "Echogenic right kidney, which can be seen in setting of renal parenchymal disease  Otherwise normal study  · WBC of 11K on admission  · Lipase of 989 on admission, improvement to 497 on 12/16  · 1-2 RBC's in microscopic urine on admission    Test Results Pending at Discharge (will require follow up):   · CMP, CBC w/diff, Lipase     Outpatient Tests Requested:  · CMP, CBC w/diff, and lipase in 3 days, scheduled for 84/36/9582    Complications:  None    Reason for Admission: Nausea with vomiting for 3 days with epigastric pain    Hospital Course:   Munira Steele is a 46 y o  female patient who originally presented to the hospital on 12/15/2022 due to N/V for 3 days with severe epigastric pain  Zyprexa injection was administered in the ED to relieve these symptoms and the patient was found to have an elevated lipase of 989  CT of the abd/pelvis demonstrated no acute abnormalities and patient was started on IV fluids and NPO  US of the RUQ was performed and did not display any gallstones  GI was consulted and suggested patient to continue her Protonix twice a day, her bowel regimen with Linzess and Colace, and Zofran as needed for nausea  She should follow up with GI with consideration of endoscopy if symptoms persist to rule out PUD and potential pyloroplasty in the future  She was also found to have secondary hypertension probable to epigastric pain  Hydralazine was given once and was available throughout her stay as needed  Pressures were in the the 120s-140s on discharge and patient is asymptomatic  On discharge patient tolerated clear liquids and then regular food well without nausea, vomiting, or abdominal pain    She was noted to have a fever at midnight and tested positive for influenza A on 12/16  She does a history of asthma but was without myalgias, fever, cough, wheezing, shortness of breath, or chest pain on discharge  PT and OT cleared patient and said she was at baseline, recommended she continue with her outpatient PT  She was started on Tamiflu 30 mg a day for 5 days total   She is aware and agreeable to outpatient blood work for Monday, 12/19/2022  Please see above list of diagnoses and related plan for additional information  Condition at Discharge: good     Discharge Day Visit / Exam:   Subjective:  Patient reports she is feeling better, without abdominal pain, nausea, or vomiting  Does feel some chest tightness on and off with cold sweats overnight but denies SOB, wheezing, chest pain/pressure, or heart palpitations  Her caregiver is pretty sick and she would like to be tested to see if she has COVID  She has an appetite and tolerated her breakfast (clear liquids) and lunch (regular food) well  She does live at home and has caregivers come in 7 am to 11 pm every day of the week  She is primarily wheelchair bound  She would like to go home  She has a ride home and is able to get her Tamiflu at the pharmacy and blood work in 3 days  Vitals: Blood Pressure: 144/80 (12/16/22 0752)  Pulse: 71 (12/16/22 0752)  Temperature: 99 °F (37 2 °C) (12/16/22 0752)  Temp Source: Temporal (12/15/22 1538)  Respirations: 20 (12/16/22 0752)  Weight - Scale: 64 5 kg (142 lb 3 2 oz) (12/16/22 0600)  SpO2: 97 % (12/16/22 0752)  Exam:   Physical Exam  Vitals and nursing note reviewed  Constitutional:       General: She is not in acute distress  Appearance: Normal appearance  She is well-developed  She is not ill-appearing  Comments: Patient seen in bed lying comfortably  HENT:      Head: Normocephalic and atraumatic  Eyes:      Conjunctiva/sclera: Conjunctivae normal    Cardiovascular:      Rate and Rhythm: Normal rate and regular rhythm        Pulses:           Radial pulses are 2+ on the right side and 2+ on the left side  Dorsalis pedis pulses are 2+ on the right side and 2+ on the left side  Heart sounds: No murmur heard  Pulmonary:      Effort: Pulmonary effort is normal  No respiratory distress  Breath sounds: Normal breath sounds  No stridor  No wheezing, rhonchi or rales  Comments: Patient on room air in no respiratory distress  Abdominal:      General: Bowel sounds are normal       Palpations: Abdomen is soft  Tenderness: There is no abdominal tenderness  Musculoskeletal:         General: No swelling  Cervical back: Neck supple  Right lower leg: No edema  Left lower leg: No edema  Skin:     General: Skin is warm and dry  Capillary Refill: Capillary refill takes less than 2 seconds  Coloration: Skin is not jaundiced or pale  Neurological:      Mental Status: She is alert  Psychiatric:         Mood and Affect: Mood normal          Behavior: Behavior normal         Discussion with Family: Patient updating herself        Discharge instructions/Information to patient and family:   See after visit summary for information provided to patient and family  Provisions for Follow-Up Care:  See after visit summary for information related to follow-up care and any pertinent home health orders  Disposition:   Home    Planned Readmission: None     Discharge Statement:  I spent 60 minutes discharging the patient  This time was spent on the day of discharge  I had direct contact with the patient on the day of discharge  Greater than 50% of the total time was spent examining patient, answering all patient questions, arranging and discussing plan of care with patient as well as directly providing post-discharge instructions  Additional time then spent on discharge activities  Discharge Medications:  See after visit summary for reconciled discharge medications provided to patient and/or family        **Please Note: This note may have been constructed using a voice recognition system**

## 2022-12-16 NOTE — ASSESSMENT & PLAN NOTE
Lab Results   Component Value Date    HGBA1C 5 7 02/28/2022       Recent Labs     12/15/22  1602 12/15/22  2039 12/16/22  0752   POCGLU 165* 150* 81       Blood Sugar Average: Last 72 hrs:  · (P) 132   · Continue home regimen of Lantus 22 units and lispro 6 units TID with meals  · Low carbohydrate diet at home

## 2022-12-16 NOTE — ASSESSMENT & PLAN NOTE
· History of gastroparesis, has been stable after placement of gastric stimulator, [facility]  · Continue outpatient medications  · Patient without evidence of sudden worsening of gastroparesis in the setting of new N/V  • GI outpatient follow up placed  Patient may benefit from surgical pyloroplasty per GI

## 2022-12-16 NOTE — PLAN OF CARE
Problem: Potential for Falls  Goal: Patient will remain free of falls  Description: INTERVENTIONS:  - Educate patient/family on patient safety including physical limitations  - Instruct patient to call for assistance with activity   - Consult OT/PT to assist with strengthening/mobility   - Keep Call bell within reach  - Keep bed low and locked with side rails adjusted as appropriate  - Keep care items and personal belongings within reach  - Initiate and maintain comfort rounds  - Make Fall Risk Sign visible to staff  - Offer Toileting every  2 Hours, in advance of need  - Initiate/Maintain bed alarm  - Apply yellow socks and bracelet for high fall risk patients  - Consider moving patient to room near nurses station  Outcome: Progressing     Problem: PAIN - ADULT  Goal: Verbalizes/displays adequate comfort level or baseline comfort level  Description: Interventions:  - Encourage patient to monitor pain and request assistance  - Assess pain using appropriate pain scale  - Administer analgesics based on type and severity of pain and evaluate response  - Implement non-pharmacological measures as appropriate and evaluate response  - Consider cultural and social influences on pain and pain management  - Notify physician/advanced practitioner if interventions unsuccessful or patient reports new pain  Outcome: Progressing     Problem: INFECTION - ADULT  Goal: Absence or prevention of progression during hospitalization  Description: INTERVENTIONS:  - Assess and monitor for signs and symptoms of infection  - Monitor lab/diagnostic results  - Monitor all insertion sites, i e  indwelling lines, tubes, and drains  - Monitor endotracheal if appropriate and nasal secretions for changes in amount and color  - [place] appropriate cooling/warming therapies per order  - Administer medications as ordered  - Instruct and encourage patient and family to use good hand hygiene technique  - Identify and instruct in appropriate isolation precautions for identified infection/condition  Outcome: Progressing     Problem: DISCHARGE PLANNING  Goal: Discharge to home or other facility with appropriate resources  Description: INTERVENTIONS:  - Identify barriers to discharge w/patient and caregiver  - Arrange for needed discharge resources and transportation as appropriate  - Identify discharge learning needs (meds, wound care, etc )  - Arrange for interpretive services to assist at discharge as needed  - Refer to Case Management Department for coordinating discharge planning if the patient needs post-hospital services based on physician/advanced practitioner order or complex needs related to functional status, cognitive ability, or social support system  Outcome: Progressing     Problem: Knowledge Deficit  Goal: Patient/family/caregiver demonstrates understanding of disease process, treatment plan, medications, and discharge instructions  Description: Complete learning assessment and assess knowledge base    Interventions:  - Provide teaching at level of understanding  - Provide teaching via preferred learning methods  Outcome: Progressing     Problem: GASTROINTESTINAL - ADULT  Goal: Minimal or absence of nausea and/or vomiting  Description: INTERVENTIONS:  - Administer IV fluids if ordered to ensure adequate hydration  - Maintain NPO status until nausea and vomiting are resolved  - Nasogastric tube if ordered  - Administer ordered antiemetic medications as needed  - Provide nonpharmacologic comfort measures as appropriate  - Advance diet as tolerated, if ordered  - Consider nutrition services referral to assist patient with adequate nutrition and appropriate food choices  Outcome: Progressing  Goal: Maintains or returns to baseline bowel function  Description: INTERVENTIONS:  - Assess bowel function  - Encourage oral fluids to ensure adequate hydration  - Administer IV fluids if ordered to ensure adequate hydration  - Administer ordered medications as needed  - Encourage mobilization and activity  - Consider nutritional services referral to assist patient with adequate nutrition and appropriate food choices  Outcome: Progressing     Problem: MOBILITY - ADULT  Goal: Maintain or return to baseline ADL function  Description: INTERVENTIONS:  -  Assess patient's ability to carry out ADLs; assess patient's baseline for ADL function and identify physical deficits which impact ability to perform ADLs (bathing, care of mouth/teeth, toileting, grooming, dressing, etc )  - Assess/evaluate cause of self-care deficits   - Assess range of motion  - Assess patient's mobility; develop plan if impaired  - Assess patient's need for assistive devices and provide as appropriate  - Encourage maximum independence but intervene and supervise when necessary  - Involve family in performance of ADLs  - Assess for home care needs following discharge   - Consider OT consult to assist with ADL evaluation and planning for discharge  - Provide patient education as appropriate  Outcome: Progressing  Goal: Maintains/Returns to pre admission functional level  Description: INTERVENTIONS:  - Perform BMAT or MOVE assessment daily    - Set and communicate daily mobility goal to care team and patient/family/caregiver  - Collaborate with rehabilitation services on mobility goals if consulted  - Perform Range of Motion 3 times a day  - Reposition patient every 2 hours    - Dangle patient 3 times a day  - Stand patient 3 times a day  - Ambulate patient 3 times a day  - Out of bed to chair 3 times a day   - Out of bed for meals 3 times a day  - Out of bed for toileting  - Record patient progress and toleration of activity level   Outcome: Progressing

## 2022-12-16 NOTE — ASSESSMENT & PLAN NOTE
· Patient presented to the ED on 12/16 with N/V x 3 days and severe epigastric pain  · Patient provided with 5 mg of Haldol per her family medicine doctor to prevent ER visits for this severe N/V and abd pain  Zyprexa was administered in the ED  · Elevated lipase 989 on admission  Patient was NPO and given NSS at 125 cc/hour   · CT abd/ pelvis demonstrated "no acute abnormality in the abdomen or pelvis"  · Seen by GI - does not fit the criteria for acute pancreatitis however she does have a history of nonalcoholic pancreatitis  · N/V could be multifactorial due to potential PUD, autonomic neuropathy from her diabetes, pain from her gastroparesis, and delayed gastric emptying  Patient does have gastric stimulator in place  · Lipase 497 today  Transitioned to clear liquids this AM and tolerating well  · Patient is hungry  GI cleared pt  Advanced to diabetic diet and patient tolerated well without N/V or abdominal pain  · Patient is ready to go home  She was educated to continue her medications and come back to the ED if she is unable to tolerate oral liquids/food/medications

## 2022-12-19 ENCOUNTER — PATIENT OUTREACH (OUTPATIENT)
Dept: FAMILY MEDICINE CLINIC | Facility: CLINIC | Age: 52
End: 2022-12-19

## 2022-12-19 DIAGNOSIS — M54.12 CERVICAL RADICULOPATHY: ICD-10-CM

## 2022-12-19 DIAGNOSIS — Z71.89 COMPLEX CARE COORDINATION: Primary | ICD-10-CM

## 2022-12-19 DIAGNOSIS — M47.816 LUMBAR SPONDYLOSIS: ICD-10-CM

## 2022-12-19 NOTE — PROGRESS NOTES
HRR     I called the patient, explained my role and she declines at this time  She had a question regarding fasting vs non-fasting lab work that is needed  I informed her to fast at least 8 hours  She plans on going tomorrow for the draw  The patient has my contact information if she finds she needs additional help in the future  Chart reviewed  Case is being closed

## 2022-12-20 ENCOUNTER — APPOINTMENT (OUTPATIENT)
Dept: LAB | Facility: CLINIC | Age: 52
End: 2022-12-20

## 2022-12-20 ENCOUNTER — APPOINTMENT (OUTPATIENT)
Dept: PHYSICAL THERAPY | Facility: REHABILITATION | Age: 52
End: 2022-12-20

## 2022-12-20 DIAGNOSIS — N18.4 CHRONIC KIDNEY DISEASE, STAGE 4 (SEVERE) (HCC): ICD-10-CM

## 2022-12-20 DIAGNOSIS — R11.2 NAUSEA WITH VOMITING: ICD-10-CM

## 2022-12-20 LAB
ALBUMIN SERPL BCP-MCNC: 3.4 G/DL (ref 3.5–5)
ALP SERPL-CCNC: 114 U/L (ref 46–116)
ALT SERPL W P-5'-P-CCNC: 27 U/L (ref 12–78)
ANION GAP SERPL CALCULATED.3IONS-SCNC: 6 MMOL/L (ref 4–13)
AST SERPL W P-5'-P-CCNC: 20 U/L (ref 5–45)
BASOPHILS # BLD MANUAL: 0 THOUSAND/UL (ref 0–0.1)
BASOPHILS NFR MAR MANUAL: 0 % (ref 0–1)
BILIRUB SERPL-MCNC: 0.28 MG/DL (ref 0.2–1)
BUN SERPL-MCNC: 42 MG/DL (ref 5–25)
CALCIUM ALBUM COR SERPL-MCNC: 9.6 MG/DL (ref 8.3–10.1)
CALCIUM SERPL-MCNC: 9.1 MG/DL (ref 8.3–10.1)
CHLORIDE SERPL-SCNC: 111 MMOL/L (ref 96–108)
CO2 SERPL-SCNC: 25 MMOL/L (ref 21–32)
CREAT SERPL-MCNC: 1.97 MG/DL (ref 0.6–1.3)
EOSINOPHIL # BLD MANUAL: 0 THOUSAND/UL (ref 0–0.4)
EOSINOPHIL NFR BLD MANUAL: 0 % (ref 0–6)
ERYTHROCYTE [DISTWIDTH] IN BLOOD BY AUTOMATED COUNT: 13.3 % (ref 11.6–15.1)
GFR SERPL CREATININE-BSD FRML MDRD: 28 ML/MIN/1.73SQ M
GIANT PLATELETS BLD QL SMEAR: PRESENT
GLUCOSE P FAST SERPL-MCNC: 99 MG/DL (ref 65–99)
HCT VFR BLD AUTO: 43.2 % (ref 34.8–46.1)
HGB BLD-MCNC: 13.7 G/DL (ref 11.5–15.4)
LIPASE SERPL-CCNC: 470 U/L (ref 73–393)
LYMPHOCYTES # BLD AUTO: 0.79 THOUSAND/UL (ref 0.6–4.47)
LYMPHOCYTES # BLD AUTO: 22 % (ref 14–44)
MCH RBC QN AUTO: 30.8 PG (ref 26.8–34.3)
MCHC RBC AUTO-ENTMCNC: 31.7 G/DL (ref 31.4–37.4)
MCV RBC AUTO: 97 FL (ref 82–98)
MONOCYTES # BLD AUTO: 0.36 THOUSAND/UL (ref 0–1.22)
MONOCYTES NFR BLD: 10 % (ref 4–12)
NEUTROPHILS # BLD MANUAL: 1.44 THOUSAND/UL (ref 1.85–7.62)
NEUTS BAND NFR BLD MANUAL: 9 % (ref 0–8)
NEUTS SEG NFR BLD AUTO: 31 % (ref 43–75)
NRBC BLD AUTO-RTO: 2 /100 WBC (ref 0–2)
PLATELET # BLD AUTO: 199 THOUSANDS/UL (ref 149–390)
PLATELET BLD QL SMEAR: ADEQUATE
PMV BLD AUTO: 11.9 FL (ref 8.9–12.7)
POLYCHROMASIA BLD QL SMEAR: PRESENT
POTASSIUM SERPL-SCNC: 4.3 MMOL/L (ref 3.5–5.3)
PROT SERPL-MCNC: 7.5 G/DL (ref 6.4–8.4)
RBC # BLD AUTO: 4.45 MILLION/UL (ref 3.81–5.12)
RBC MORPH BLD: PRESENT
SODIUM SERPL-SCNC: 142 MMOL/L (ref 135–147)
VARIANT LYMPHS # BLD AUTO: 28 %
WBC # BLD AUTO: 3.6 THOUSAND/UL (ref 4.31–10.16)

## 2022-12-20 RX ORDER — TIZANIDINE HYDROCHLORIDE 4 MG/1
CAPSULE, GELATIN COATED ORAL
Qty: 90 CAPSULE | Refills: 1 | Status: SHIPPED | OUTPATIENT
Start: 2022-12-20

## 2022-12-20 NOTE — UTILIZATION REVIEW
NOTIFICATION OF ADMISSION DISCHARGE   This is a Notification of Discharge from [address]  Please be advised that this patient has been discharge from our facility  Below you will find the admission and discharge date and time including the patient’s disposition  UTILIZATION REVIEW CONTACT:  Elen Renee  Utilization   Network Utilization Review Department  Phone: [phone] x carefully listen to the prompts  All voicemails are confidential   Email: [email or web address]     ADMISSION INFORMATION  PRESENTATION DATE: 12/15/2022  8:50 AM    INPATIENT ADMISSION DATE: 12/15/22  2:25 PM   DISCHARGE DATE: 12/16/2022  2:23 PM   DISPOSITION:Home/Self Care    IMPORTANT INFORMATION:  Send all requests for admission clinical reviews, approved or denied determinations and any other requests to dedicated fax number below belonging to the campus where the patient is receiving treatment  List of dedicated fax numbers:  [address] DENIALS (Administrative/Medical Necessity) [phone]   [address] (Maternity/NICU/Pediatrics) [phone]   [facility] [phone]   [facility] 87 [phone]   [address] [phone]   220 [facility] [address] [place] [address] [phone]   [facility]  [phone] 4058 [facility] [phone]   [address] [place] [address] [phone]   [address] [place] [address] [phone]           Jannette Callahan PA-C  Physician Assistant  Hospitalist  Discharge Summary     Attested  Date of Service:  12/16/2022 12:22 PM     Attested            Attestation signed by Karolina Bruce DO at 12/16/2022  3:52 PM     I was the supervising/collaborating physician on this note    I was available by phone, if needed, for consultation      Chacha Tee DO 12/16/22               [address] 48  Discharge- Herbert Goods 1970, 46 y o  female MRN: [id number]  Unit/Bed#: E5 -01 Encounter: [id number]  Primary Care Provider: Kyle Olvera PA-C   Date and time admitted to hospital: 12/15/2022  8:50 AM     * Nausea with vomiting  Assessment & Plan  • Patient presented to the ED on 12/16 with N/V x 3 days and severe epigastric pain  • Patient provided with 5 mg of Haldol per her family medicine doctor to prevent ER visits for this severe N/V and abd pain  Zyprexa was administered in the ED  • Elevated lipase 989 on admission  Patient was NPO and given NSS at 125 cc/hour   • CT abd/ pelvis demonstrated "no acute abnormality in the abdomen or pelvis"  • Seen by GI - does not fit the criteria for acute pancreatitis however she does have a history of nonalcoholic pancreatitis  • N/V could be multifactorial due to potential PUD, autonomic neuropathy from her diabetes, pain from her gastroparesis, and delayed gastric emptying  Patient does have gastric stimulator in place  • Lipase 497 today  Transitioned to clear liquids this AM and tolerating well  • Patient is hungry  GI cleared pt  Advanced to diabetic diet and patient tolerated well without N/V or abdominal pain  • Patient is ready to go home  She was educated to continue her medications and come back to the ED if she is unable to tolerate oral liquids/food/medications          Gastroparesis  Assessment & Plan  • History of gastroparesis, has been stable after placement of gastric stimulator, previously followed by [facility]  • Continue outpatient medications  • Patient without evidence of sudden worsening of gastroparesis in the setting of new N/V  • GI outpatient follow up placed   Patient may benefit from surgical pyloroplasty per GI      Influenza A  Assessment & Plan  • Patient with 100 7 temperature at midnight 12/16 and complaints of infrequent chest tightness  • RSV/Flu/COVID panel ordered - Influenza A positive  • Patient is without myalgias, SOB, cough, or wheezing at this time  Afebrile  She would like to go home  • Patient was given fluids, and tylenol/duoneb were ordered prn  • Asthma treatment listed below  • Will prescribe renally dosed Tamiflu given symptoms started < 48 hours ago  30 mg once daily for a total of 5 days  Sent to pharmacy       Asthma  Assessment & Plan  • Stable  • Patient reports she feels some chest tightness on and off and had cold sweats over night  Reports one of her caregivers is pretty sick  • Tested positive for influenza A on respiratory panel today  • No wheezing or dyspnea on exam  Patient on room air sating 97%  • Continue home inhaler regimen of Symbicort and albuterol  • Patient would like to go home  Discussed with her any worsening symptoms such as difficulty breathing or needing her inhaler more often then prescribed she should come back to the ED       Type 2 diabetes mellitus with stage 3 chronic kidney disease, with long-term current use of insulin [facility])  Assessment & Plan        Lab Results   Component Value Date     HGBA1C 5 7 02/28/2022         Recent Labs     12/15/22  1602 12/15/22  2039 12/16/22  0752   POCGLU 165* 150* 81         Blood Sugar Average: Last 72 hrs:  • (P) 132   • Continue home regimen of Lantus 22 units and lispro 6 units TID with meals  • Low carbohydrate diet at home     Chronic kidney disease, stage 4 (severe) [facility])  Assessment & Plan        Lab Results   Component Value Date     EGFR 27 12/16/2022     EGFR 32 12/15/2022     EGFR 32 10/17/2022     CREATININE 2 03 (H) 12/16/2022     CREATININE 1 77 (H) 12/15/2022     CREATININE 1 78 (H) 10/17/2022      • POA creatinine 1 77, which appears to be approximately the patients baseline  • Slightly elevated today  Patient tolerating liquids well  Does not appear dehydrated  • Will repeat CMP for patient to get in 3 days   She is aware and agreeable  • Referral for follow up with PCP placed and discussed with patient as well     Cervical radiculopathy  Assessment & Plan  • Chronic, patient recently received steroid injections 12/13/2022  • Does report having a medical marijuana card  • Continue to follow with orthopedics and pain management     Polyarthritis  Assessment & Plan  • Patient with chronic pain, followed by rheumatology, orthopedics, and pain management  • Chronically on opioids for management   • Mobility limited to wheelchair due to pain  • Continue home medications and lidocaine patches      Continuous opioid dependence ([facility] [address] )  Assessment & Plan  • Patient reports chronic opioid dependence to to history of cervical radiculopathy, polyarthralgia, and neuropathy  • Patient chronically on 15 mg oxycodone daily q4 hours PRN for pain control of these conditions   • PDMP reviewed   • Followed outpatient by pain management, rheumatology, and orthopedics  • Safe to continue home medications as prescribed     MPA (microscopic polyangiitis) (HCC)  Assessment & Plan  • Stable, chronic  • Followed outpatient by rheumatology     Other secondary hypertension  Assessment & Plan  • Patient blood pressures elevated with SBP > 180 at times  No history of primary hypertension, likely secondary to acute epigastric pain  • Hydralazine 10 mg IV for SBP > 160 available  • -140's today and stable     Bipolar 1 disorder [facility])  Assessment & Plan  • Patient reports she is feeling well   No change in her mood, appetite, or sleep  • Continue wellbutrin and adderall     Schizo-affective schizophrenia [facility])  Assessment & Plan  • Continue wellbutrin and adderall         Medical Problems      Resolved Problems  Date Reviewed: 12/16/2022   None         Discharging Physician / Practitioner: Marsha Mantilla PA-C  PCP: Yeny Crenshaw PA-C  Admission Date:       Admission Orders (From admission, onward)       Ordered         12/15/22 1425   INPATIENT ADMISSION  Once                         Discharge Date: 12/16/22     Consultations During Hospital Stay:  • Gastroenterology     Procedures Performed:   • None     Significant Findings / Test Results:   • Influenza A positive on respiratory panel 12/16     Incidental Findings:   • Right upper quadrant US 12/15- "Echogenic right kidney, which can be seen in setting of renal parenchymal disease   Otherwise normal study  • WBC of 11K on admission  • Lipase of 989 on admission, improvement to 497 on 12/16  • 1-2 RBC's in microscopic urine on admission     Test Results Pending at Discharge (will require follow up):   • CMP, CBC w/diff, Lipase     Outpatient Tests Requested:  • CMP, CBC w/diff, and lipase in 3 days, scheduled for 53/51/0616     Complications:  None     Reason for Admission: Nausea with vomiting for 3 days with epigastric pain     Hospital Course:   Jennifer So is a 46 y o  female patient who originally presented to the hospital on 12/15/2022 due to N/V for 3 days with severe epigastric pain  Zyprexa injection was administered in the ED to relieve these symptoms and the patient was found to have an elevated lipase of 989  CT of the abd/pelvis demonstrated no acute abnormalities and patient was started on IV fluids and NPO  US of the RUQ was performed and did not display any gallstones  GI was consulted and suggested patient to continue her Protonix twice a day, her bowel regimen with Linzess and Colace, and Zofran as needed for nausea  She should follow up with GI with consideration of endoscopy if symptoms persist to rule out PUD and potential pyloroplasty in the future  She was also found to have secondary hypertension probable to epigastric pain  Hydralazine was given once and was available throughout her stay as needed  Pressures were in the the 120s-140s on discharge and patient is asymptomatic    On discharge patient tolerated clear liquids and then regular food well without nausea, vomiting, or abdominal pain  She was noted to have a fever at midnight and tested positive for influenza A on 12/16  She does a history of asthma but was without myalgias, fever, cough, wheezing, shortness of breath, or chest pain on discharge  PT and OT cleared patient and said she was at baseline, recommended she continue with her outpatient PT  She was started on Tamiflu 30 mg a day for 5 days total   She is aware and agreeable to outpatient blood work for Monday, 12/19/2022       Please see above list of diagnoses and related plan for additional information       Condition at Discharge: good      Discharge Day Visit / Exam:   Subjective:  Patient reports she is feeling better, without abdominal pain, nausea, or vomiting  Does feel some chest tightness on and off with cold sweats overnight but denies SOB, wheezing, chest pain/pressure, or heart palpitations  Her caregiver is pretty sick and she would like to be tested to see if she has COVID  She has an appetite and tolerated her breakfast (clear liquids) and lunch (regular food) well  She does live at home and has caregivers come in 7 am to 11 pm every day of the week  She is primarily wheelchair bound  She would like to go home  She has a ride home and is able to get her Tamiflu at the pharmacy and blood work in 3 days       Vitals: Blood Pressure: 144/80 (12/16/22 0752)  Pulse: 71 (12/16/22 0752)  Temperature: 99 °F (37 2 °C) (12/16/22 0752)  Temp Source: Temporal (12/15/22 1538)  Respirations: 20 (12/16/22 0752)  Weight - Scale: 64 5 kg (142 lb 3 2 oz) (12/16/22 0600)  SpO2: 97 % (12/16/22 0752)  Exam:   Physical Exam  Vitals and nursing note reviewed  Constitutional:       General: She is not in acute distress  Appearance: Normal appearance  She is well-developed  She is not ill-appearing  Comments: Patient seen in bed lying comfortably  HENT:      Head: Normocephalic and atraumatic     Eyes:      Conjunctiva/sclera: Conjunctivae normal  Cardiovascular:      Rate and Rhythm: Normal rate and regular rhythm  Pulses:           Radial pulses are 2+ on the right side and 2+ on the left side  Dorsalis pedis pulses are 2+ on the right side and 2+ on the left side  Heart sounds: No murmur heard  Pulmonary:      Effort: Pulmonary effort is normal  No respiratory distress  Breath sounds: Normal breath sounds  No stridor  No wheezing, rhonchi or rales  Comments: Patient on room air in no respiratory distress  Abdominal:      General: Bowel sounds are normal       Palpations: Abdomen is soft  Tenderness: There is no abdominal tenderness  Musculoskeletal:         General: No swelling  Cervical back: Neck supple  Right lower leg: No edema  Left lower leg: No edema  Skin:     General: Skin is warm and dry  Capillary Refill: Capillary refill takes less than 2 seconds  Coloration: Skin is not jaundiced or pale  Neurological:      Mental Status: She is alert  Psychiatric:         Mood and Affect: Mood normal          Behavior: Behavior normal          Discussion with Family: Patient updating herself         Discharge instructions/Information to patient and family:   See after visit summary for information provided to patient and family        Provisions for Follow-Up Care:  See after visit summary for information related to follow-up care and any pertinent home health orders  Disposition:   Home     Planned Readmission: None     Discharge Statement:  I spent 60 minutes discharging the patient  This time was spent on the day of discharge  I had direct contact with the patient on the day of discharge  Greater than 50% of the total time was spent examining patient, answering all patient questions, arranging and discussing plan of care with patient as well as directly providing post-discharge instructions    Additional time then spent on discharge activities      Discharge Medications:  See after visit summary for reconciled discharge medications provided to patient and/or family        **Please Note: This note may have been constructed using a voice recognition system**                 Cosigned by: Mariella Buerger, DO at 12/16/2022  3:52 PM     Revision History

## 2022-12-23 DIAGNOSIS — G89.4 CHRONIC PAIN SYNDROME: ICD-10-CM

## 2022-12-23 DIAGNOSIS — M31.31 GRANULOMATOSIS WITH POLYANGIITIS WITH RENAL INVOLVEMENT (HCC): ICD-10-CM

## 2022-12-23 RX ORDER — OXYCODONE HYDROCHLORIDE 15 MG/1
15 TABLET ORAL EVERY 4 HOURS PRN
Qty: 180 TABLET | Refills: 0 | Status: SHIPPED | OUTPATIENT
Start: 2022-12-23

## 2022-12-27 DIAGNOSIS — R21 RASH: ICD-10-CM

## 2022-12-27 RX ORDER — CLOBETASOL PROPIONATE 0.5 MG/G
CREAM TOPICAL
Qty: 60 G | Refills: 0 | Status: SHIPPED | OUTPATIENT
Start: 2022-12-27

## 2022-12-30 ENCOUNTER — OFFICE VISIT (OUTPATIENT)
Dept: FAMILY MEDICINE CLINIC | Facility: CLINIC | Age: 52
End: 2022-12-30

## 2022-12-30 VITALS
OXYGEN SATURATION: 97 % | SYSTOLIC BLOOD PRESSURE: 100 MMHG | HEART RATE: 81 BPM | BODY MASS INDEX: 26.01 KG/M2 | RESPIRATION RATE: 18 BRPM | HEIGHT: 62 IN | TEMPERATURE: 96.9 F | DIASTOLIC BLOOD PRESSURE: 70 MMHG

## 2022-12-30 DIAGNOSIS — E11.22 TYPE 2 DIABETES MELLITUS WITH STAGE 3 CHRONIC KIDNEY DISEASE, WITH LONG-TERM CURRENT USE OF INSULIN, UNSPECIFIED WHETHER STAGE 3A OR 3B CKD (HCC): Chronic | ICD-10-CM

## 2022-12-30 DIAGNOSIS — J20.9 ACUTE BRONCHITIS, UNSPECIFIED ORGANISM: Primary | ICD-10-CM

## 2022-12-30 DIAGNOSIS — M54.12 CERVICAL RADICULOPATHY: Chronic | ICD-10-CM

## 2022-12-30 DIAGNOSIS — K31.84 GASTROPARESIS: Chronic | ICD-10-CM

## 2022-12-30 DIAGNOSIS — F31.9 BIPOLAR 1 DISORDER (HCC): ICD-10-CM

## 2022-12-30 DIAGNOSIS — N18.30 TYPE 2 DIABETES MELLITUS WITH STAGE 3 CHRONIC KIDNEY DISEASE, WITH LONG-TERM CURRENT USE OF INSULIN, UNSPECIFIED WHETHER STAGE 3A OR 3B CKD (HCC): Chronic | ICD-10-CM

## 2022-12-30 DIAGNOSIS — G62.9 NEUROPATHY: Chronic | ICD-10-CM

## 2022-12-30 DIAGNOSIS — G89.4 CHRONIC PAIN SYNDROME: Chronic | ICD-10-CM

## 2022-12-30 DIAGNOSIS — M31.7 MPA (MICROSCOPIC POLYANGIITIS) (HCC): Chronic | ICD-10-CM

## 2022-12-30 DIAGNOSIS — Z79.4 TYPE 2 DIABETES MELLITUS WITH STAGE 3 CHRONIC KIDNEY DISEASE, WITH LONG-TERM CURRENT USE OF INSULIN, UNSPECIFIED WHETHER STAGE 3A OR 3B CKD (HCC): Chronic | ICD-10-CM

## 2022-12-30 RX ORDER — GUAIFENESIN 100 MG/5ML
200 SYRUP ORAL 3 TIMES DAILY PRN
Qty: 473 ML | Refills: 0 | Status: SHIPPED | OUTPATIENT
Start: 2022-12-30

## 2022-12-30 RX ORDER — AZITHROMYCIN 250 MG/1
TABLET, FILM COATED ORAL
Qty: 6 TABLET | Refills: 0 | Status: SHIPPED | OUTPATIENT
Start: 2022-12-30 | End: 2023-01-04

## 2022-12-30 NOTE — PROGRESS NOTES
Assessment/Plan:    Type 2 diabetes mellitus with stage 3 chronic kidney disease, with long-term current use of insulin ([facility] [address] )   - Patient has history of type 2 diabetes with long-term use of insulin with associated neuropathy and chronic kidney disease stage 3-4   - Patient reports not having Toujeo for the past few months  Will provide refill    - Continue Toujeo 22 units daily and NovoLog 6 units, 3 times daily with meals   - Continue checking daily blood sugars  Lab Results   Component Value Date    HGBA1C 5 7 02/28/2022    HGBA1C 5 7 09/29/2021    HGBA1C 6 0 02/15/2021       Neuropathy  - Patient had previously tried gabapentin, but it caused restless legs syndrome  Patient recently tried Cymbalta, but it caused side effect of dizziness, abdominal pain, vomiting   - Patient is now prescribed Lyrica 100 mg, three times daily, however, patient has now stopped taking it due to low blood pressure readings  - Patient is scheduled for updated EMG studies of bilateral upper and lower extremities next year  - Patient is due for neurology follow-up, last seen in April 2021  Updated referral placed today  Chronic pain  - Continue oxycodone 15 mg, every 4 hours as needed  PDMP reviewed  No red flags noted  - UDS is UTD    - Patient has now stopped taking Lyrica due to low blood pressure readings  - Continue follow-up with pain management as scheduled  Generalized abdominal pain/nausea/Gastroparesis  - Recently hospitalized  Currently symptoms have improved  - Recommend follow up with Gastroenterology  - Patient is going to contact her insurance to see about nutrition consult  - Advised to contact the office or report to ED if symptoms persist, worsen, or new symptoms arise  Bipolar 1 disorder (HCC)  - Stable  Continue Wellbutrin, Adderall   - PDMP reviewed  No red flags noted    - Continue follow-up with therapist as scheduled      MPA (microscopic polyangiitis) ([facility] [address] )  - Originally diagnosed in 2015  Currently in remission  Previously on combination plasmapheresis, steroids, Cytoxan  Patient was then briefly on imuran until 2017  - Last seen by Rheumatology in February 2018, was then lost to follow up  - Continue follow up with Rheumatology as scheduled  Cervical radiculopathy/pelvic pain/back pain  -Received recent steroid injection  Continue follow-up with pain management scheduled  - Patient had been following with outpatient physical therapy, but when patient was leaving last PT visit, patient became weak, and fell forward out of her wheelchair  Patient prefers to have PT at her home where she feels safer  As an alternative, patient is open to receiving home exercise program          Diagnoses and all orders for this visit:    Acute bronchitis, unspecified organism  -     azithromycin (Zithromax) 250 mg tablet; Take 2 tablets (500 mg total) by mouth daily for 1 day, THEN 1 tablet (250 mg total) daily for 4 days   -     guaiFENesin (ROBITUSSIN) 100 MG/5ML oral liquid; Take 10 mL (200 mg total) by mouth 3 (three) times a day as needed for cough or congestion    Type 2 diabetes mellitus with stage 3 chronic kidney disease, with long-term current use of insulin, unspecified whether stage 3a or 3b CKD (HCC)    MPA (microscopic polyangiitis) (HCC)    Chronic pain syndrome    Cervical radiculopathy    Bipolar 1 disorder (HCC)    Gastroparesis    Neuropathy        All of patients questions were answered  Patient understands and agrees with the above plan  Return for Next scheduled follow up as scheduled on 1/26/23  Anders Dickson PA-C  12/30/22  [facility] FAITH Jackson          Subjective:     Patient ID: India Wells  is a 46 y o  female with known PMH of gastroparesis, GERD, constipation, ADHD, type 2 diabetes mellitus, cataplexy, Wegener's granulomatosis, small fiber neuropathy, bipolar 1 disorder   who presents today in office for hospital follow up       - Patient is a 46 y o  female who presents today for hospital follow up  [facility] 12/16/22 due to nausea and vomiting  CT abdomen/pelvis demonstrated no acute abnormality noticed  Patient had elevated lipase on admission, was n p o  and given fluids  Patient was seen by gastroenterology and did not fit criteria for acute pancreatitis  Lipase decreased and patient was transitioned to diabetic diet  Patient had also tested positive for influenza A  Patient notes she has been feeling better, but continues with productive cough, raspiness of voice, headaches  Patient notes symptoms are worse at night     - Patient received YANNI C7-T1 on 12/13/22  Patient notes injection has been helping until today  Patient notes recently she has been experiencing increased right knee pain and right ankle pain  Patient notes she previously had a knee brace, but it was from a few years ago when she was much heavier  Patient notes it no longer fits  Patient notes she would like a new knee brace and an ankle brace  Patient notes she uses [employer] equipment  Patient notes she believes she needs more stability with her right ankle because at times she felt that it was giving out and she almost fell     -Patient notes she was sent to outpatient physical therapy for her back and pelvic floor  Patient notes at her last appointment, they were working on her arms  Patient notes after leaving her most recent appointment, she became weak and fell straight forward out of her wheelchair  Patient notes due to this, she is afraid to come to physical therapy outpatient  Patient would prefer to have physical therapy at her house  Patient notes the physical therapy was helpful and would like to continue      - Patient notes it is very difficult to eat due to her many medical issues    Patient is interested in following with a nutritionist   Patient notes [facility] does have a nutritionist, but they are not available for another 6 months and patient does not wish to wait that long     -Also, patient notes she has now completely stopped taking Lyrica  Patient notes she stopped taking it over a week ago because it was causing low blood pressure readings  The following portions of the patient's history were reviewed and updated as appropriate: allergies, current medications, past family history, past medical history, past social history, past surgical history and problem list         Review of Systems   Constitutional: Negative for appetite change, fatigue and fever  HENT: Negative for congestion and sore throat  Eyes: Negative for pain  Respiratory: Negative for cough, chest tightness and shortness of breath  Cardiovascular: Negative for chest pain and palpitations  Gastrointestinal: Negative for abdominal pain, constipation, diarrhea, nausea and vomiting  Genitourinary: Positive for pelvic pain  Negative for difficulty urinating and dysuria  Musculoskeletal: Positive for arthralgias, back pain, myalgias and neck pain  Skin: Negative for rash  Neurological: Positive for weakness and numbness  Negative for dizziness and headaches  Psychiatric/Behavioral: The patient is not nervous/anxious  Objective:   Vitals:    12/30/22 1301   BP: 100/70   BP Location: Right arm   Patient Position: Sitting   Cuff Size: Adult   Pulse: 81   Resp: 18   Temp: (!) 96 9 °F (36 1 °C)   TempSrc: Temporal   SpO2: 97%   Height: 5' 2" (1 575 m)         Physical Exam  Vitals and nursing note reviewed  Constitutional:       General: She is not in acute distress  Appearance: She is well-developed  Comments: Examined in wheelchair   HENT:      Head: Normocephalic and atraumatic  Right Ear: External ear normal       Left Ear: External ear normal       Nose: Nose normal    Eyes:      Conjunctiva/sclera: Conjunctivae normal    Cardiovascular:      Rate and Rhythm: Normal rate and regular rhythm  Pulses: Normal pulses  Heart sounds: Normal heart sounds  Pulmonary:      Effort: Pulmonary effort is normal  No respiratory distress  Breath sounds: Normal breath sounds  No wheezing  Abdominal:      General: Bowel sounds are normal       Palpations: Abdomen is soft  Tenderness: There is no abdominal tenderness  Musculoskeletal:      Cervical back: Normal range of motion and neck supple  Skin:     General: Skin is warm and dry  Neurological:      Mental Status: She is alert and oriented to person, place, and time     Psychiatric:         Behavior: Behavior normal

## 2023-01-02 DIAGNOSIS — F32.A DEPRESSION, UNSPECIFIED DEPRESSION TYPE: ICD-10-CM

## 2023-01-02 PROBLEM — K31.84 GASTROPARESIS: Chronic | Status: ACTIVE | Noted: 2019-02-21

## 2023-01-03 RX ORDER — BUPROPION HYDROCHLORIDE 300 MG/1
TABLET ORAL
Qty: 30 TABLET | Refills: 0 | Status: SHIPPED | OUTPATIENT
Start: 2023-01-03

## 2023-01-03 NOTE — ASSESSMENT & PLAN NOTE
- Patient had previously tried gabapentin, but it caused restless legs syndrome  Patient recently tried Cymbalta, but it caused side effect of dizziness, abdominal pain, vomiting   - Patient is now prescribed Lyrica 100 mg, three times daily, however, patient has now stopped taking it due to low blood pressure readings  - Patient is scheduled for updated EMG studies of bilateral upper and lower extremities next year  - Patient is due for neurology follow-up, last seen in April 2021  Updated referral placed today

## 2023-01-03 NOTE — ASSESSMENT & PLAN NOTE
- Continue oxycodone 15 mg, every 4 hours as needed  PDMP reviewed  No red flags noted  - UDS is UTD    - Patient has now stopped taking Lyrica due to low blood pressure readings  - Continue follow-up with pain management as scheduled

## 2023-01-05 DIAGNOSIS — F31.9 BIPOLAR 1 DISORDER (HCC): ICD-10-CM

## 2023-01-09 ENCOUNTER — TELEPHONE (OUTPATIENT)
Dept: FAMILY MEDICINE CLINIC | Facility: CLINIC | Age: 53
End: 2023-01-09

## 2023-01-09 DIAGNOSIS — F31.9 BIPOLAR 1 DISORDER (HCC): ICD-10-CM

## 2023-01-09 RX ORDER — DEXTROAMPHETAMINE SACCHARATE, AMPHETAMINE ASPARTATE MONOHYDRATE, DEXTROAMPHETAMINE SULFATE AND AMPHETAMINE SULFATE 5; 5; 5; 5 MG/1; MG/1; MG/1; MG/1
20 CAPSULE, EXTENDED RELEASE ORAL 2 TIMES DAILY
Qty: 60 CAPSULE | Refills: 0 | Status: CANCELLED | OUTPATIENT
Start: 2023-01-09

## 2023-01-09 RX ORDER — DEXTROAMPHETAMINE SACCHARATE, AMPHETAMINE ASPARTATE MONOHYDRATE, DEXTROAMPHETAMINE SULFATE AND AMPHETAMINE SULFATE 5; 5; 5; 5 MG/1; MG/1; MG/1; MG/1
20 CAPSULE, EXTENDED RELEASE ORAL 2 TIMES DAILY
Qty: 60 CAPSULE | Refills: 0 | Status: SHIPPED | OUTPATIENT
Start: 2023-01-09

## 2023-01-09 NOTE — TELEPHONE ENCOUNTER
she came here asking status of her meds I advise pt per the notes from Tariq Oden listed below    She requested refill 1/5 so we are currently within the 2 business day policy at this point   Pt should be requesting at least 3 business days prior to needing refills      If it is not completed by end of day today, we can route a high priority message    Pt stated ok and left

## 2023-01-09 NOTE — TELEPHONE ENCOUNTER
VOICEMAIL:  You guys sent me a fucking hold  You keep on hanging up on me ugh  Now I'm supposed to leave a message and wait 24 hours are fuck you don't have no specific ugh  You changed up your messages and I'm gonna have to go over there because cause you gotta keep ugh on literally hanging up on me, deliberately freakin, putting me on hold ugh, taking care of somebody else and then hanging up on me 15 minutes at a time  I'm waiting on hold and then that a message comes up after 15 minutes on hold   Just  ugh

## 2023-01-09 NOTE — TELEPHONE ENCOUNTER
Patient called over 5 times today  2 calls just here at the end of my shift  I spoke to her this last time and let her know I've sent a tiger text to PCP to look at her request prior to end of day today  I did review the office policy on controlled med refills as well  She is now also aware she can follow message status on her mychart

## 2023-01-09 NOTE — TELEPHONE ENCOUNTER
She requested refill 1/5 so we are currently within the 2 business day policy at this point  Pt should be requesting at least 3 business days prior to needing refills      If it is not completed by end of day today, we can route a high priority message

## 2023-01-09 NOTE — TELEPHONE ENCOUNTER
01/09/23  Pt called office today requesting refill on the following med:    amphetamine-dextroamphetamine (ADDERALL XR) 20 MG 24 hr capsule      Pt states that she has been requesting med for 4 days and that she is due  Pt was not upset, but a little bother that she still does not have her refill and that she states that she can’t be without the med because she has been taking them for 7 years  Pt also stated that she can not wait 2 or 3 days for the refill  Any questions, please contact Pt

## 2023-01-10 ENCOUNTER — OFFICE VISIT (OUTPATIENT)
Dept: PAIN MEDICINE | Facility: MEDICAL CENTER | Age: 53
End: 2023-01-10

## 2023-01-10 VITALS
DIASTOLIC BLOOD PRESSURE: 54 MMHG | OXYGEN SATURATION: 95 % | HEART RATE: 60 BPM | SYSTOLIC BLOOD PRESSURE: 98 MMHG | BODY MASS INDEX: 26.01 KG/M2 | HEIGHT: 62 IN

## 2023-01-10 DIAGNOSIS — M46.1 SACROILIITIS (HCC): Primary | ICD-10-CM

## 2023-01-10 DIAGNOSIS — M54.2 NECK PAIN: ICD-10-CM

## 2023-01-10 DIAGNOSIS — M47.816 LUMBAR SPONDYLOSIS: ICD-10-CM

## 2023-01-10 DIAGNOSIS — M79.18 MYOFASCIAL PAIN SYNDROME: ICD-10-CM

## 2023-01-10 DIAGNOSIS — M54.16 LUMBAR RADICULOPATHY: ICD-10-CM

## 2023-01-10 RX ORDER — NORTRIPTYLINE HYDROCHLORIDE 10 MG/1
10 CAPSULE ORAL
Qty: 30 CAPSULE | Refills: 2 | Status: SHIPPED | OUTPATIENT
Start: 2023-01-10

## 2023-01-10 RX ORDER — CALCIUM CARBONATE 600 MG
TABLET ORAL
COMMUNITY
Start: 2022-12-20

## 2023-01-10 NOTE — PROGRESS NOTES
Assessment:  1  Sacroiliitis ([address] )    2  Lumbar radiculopathy    3  Lumbar spondylosis    4  Neck pain    5  Myofascial pain syndrome        Plan:  While the patient was in the office today, I did have a thorough conversation regarding their chronic pain syndrome, medication management, and treatment plan options  The patient is scheduled for sacroiliac joint injections  Based on patient report and physical exam, the patient's symptomatology does seem to be consistent with sacroiliac mediated pain from sacroiliitis  We will schedule the patient for a bilateral SIJ injection to decrease any inflammatory component of the patient's pain symptoms  The patient recently underwent cervical epidural steroid injection C7-T1 on  12/13/2022 and is able to report 70% reduction in neck and upper extremity pain  She is very pleased with the results of this procedure and does not feel that her residual pain warrants and another injection at this point  The patient has been unable to tolerate Lyrica and gabapentin in the past   I have recommended trial of low-dose nortriptyline at 10 mg nightly to try and alleviate some of her nighttime pain  Complete risks and benefits including bleeding, infection, tissue reaction, nerve injury and allergic reaction were discussed  The approach was demonstrated using models and literature was provided  Verbal and written consent was obtained  My impressions and treatment recommendations were discussed in detail with the patient who verbalized understanding and had no further questions  Discharge instructions were provided  I personally saw and examined the patient and I agree with the above discussed plan of care  No orders of the defined types were placed in this encounter      New Medications Ordered This Visit   Medications   • Calcium 600 1500 (600 Ca) MG TABS   • nortriptyline (PAMELOR) 10 mg capsule     Sig: Take 1 capsule (10 mg total) by mouth daily at bedtime Dispense:  30 capsule     Refill:  2       History of Present Illness:  Chaparro Ledesma is a 46 y o  female who presents for a follow up office visit in regards to Back Pain (Follow up post YANNI/) and Leg Pain (Throbbing and sharp pain)  The patient’s current symptoms include chronic neck and low back pain which she presently rates a 10 out of 10 on the pain scale and describes it as a constant burning, sharp, throbbing, pressure-like, shooting pain with numbness and paresthesias throughout her entire body  She states that the numbness and tingling and burning sensations in her legs are more so at nighttime  Since her last visit she had called with increased low back pain and was scheduled for bilateral sacroiliac joint injections which have been beneficial in reducing this pain pattern in the past   On 12/13/2022 she underwent C7-T1 epidural steroid injection and is able to report 70% reduction in that particular pain pattern  I have personally reviewed and/or updated the patient's past medical history, past surgical history, family history, social history, current medications, allergies, and vital signs today  Review of Systems   Constitutional: Negative for chills and fever  HENT: Negative for ear pain and sore throat  Eyes: Negative for pain and visual disturbance  Respiratory: Negative for cough and shortness of breath  Cardiovascular: Negative for chest pain and palpitations  Gastrointestinal: Positive for constipation, diarrhea, nausea and vomiting  Negative for abdominal pain  Genitourinary: Negative for dysuria and hematuria  Musculoskeletal: Positive for arthralgias, back pain, gait problem, myalgias and neck pain  Skin: Negative for color change and rash  Neurological: Positive for dizziness and weakness  Negative for seizures and syncope  All other systems reviewed and are negative        Patient Active Problem List   Diagnosis   • Type 2 diabetes mellitus with stage 3 chronic kidney disease, with long-term current use of insulin ([facility])   • Dyslipidemia   • Granulomatosis with polyangiitis ([facility])   • MPA (microscopic polyangiitis) ([facility])   • Asthma   • Benign essential HTN   • Chronic right shoulder pain   • Noncompliance   • Bipolar 1 disorder ([facility])   • Epigastric pain   • Pancreatitis   • Ovarian cyst   • Postherpetic neuralgia   • Anemia of chronic disease   • Arthralgia of multiple joints   • Cataplexy   • Weakness of both lower extremities   • Chronic pelvic pain in female   • Plantar wart, right foot   • Seasonal allergic rhinitis due to pollen   • Gastroesophageal reflux disease   • Weakness of both upper extremities   • Persistent proteinuria   • Left leg pain   • Controlled substance agreement signed   • Chronic narcotic use   • Small fiber neuropathy   • IBS (irritable bowel syndrome)   • Leukocytosis   • Gastroparesis   • Hyperosmia   • Smell disturbance   • Contusion of left hip   • Trochanteric bursitis of left hip   • Neuropathy   • Attention deficit hyperactivity disorder (ADHD)   • Elevated blood pressure reading   • Costochondritis   • Hearing difficulty of both ears   • Vaginal atrophy   • Alpha-hemolytic Streptococcus positive urine culture   • Schizo-affective schizophrenia ([facility])   • 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[facility])   • Cervical radiculopathy   • Finger numbness   • Fall at home, initial encounter   • Sinus bradycardia   • Marijuana use   • Difficulty ventilating with mask   • Chronic bilateral low back pain without sciatica   • Lump of skin of back   • Cervical disc disorder with radiculopathy of mid-cervical region   • Neck pain   • Cervical spinal stenosis   • Depression   • Continuous opioid dependence ([facility])   • Chronic kidney disease, stage 4 (severe) ([facility])   • Chronic pain   • COVID   • PTSD (post-traumatic stress disorder)   • Pain of finger of right hand   • Sacroiliitis ([facility])   • Myofascial pain syndrome   • Lumbar spondylosis   • Chronic right hip pain   • Nausea with vomiting   • Osteopenia   • Pain in finger of left hand   • Intrinsic urethral sphincter deficiency   • Urinary incontinence, mixed   • Other constipation   • Polyarthritis   • Other secondary hypertension   • Influenza A       Past Medical History:   Diagnosis Date   • ADHD    • Anemia of chronic disease    • Anxiety    • Arthritis ? • Asthma    • Bipolar disorder ([place] [address] )    • Borderline personality disorder ([place] [address] )    • Cataplexy    • Chronic abdominal pain    • Chronic kidney disease ? • CKD (chronic kidney disease) stage 3, GFR 30-59 ml/min ([facility])    • Cushing syndrome (H[facility])    • Depression ? • Diabetes mellitus ([place] [address] )    • DVT (deep venous thrombosis) ([facility])    • GERD (gastroesophageal reflux disease)    • Headache(784 0) 3 months   • History of acute pancreatitis     felt secondary to Bactrim   • History of transfusion    • Hypertension    • Liver disease     fatty liver   • Microscopic polyangiitis (HCC)    • Ovarian cyst    • PTSD (post-traumatic stress disorder)    • Self-inflicted injury     self inflicted skin wounds   • Sleep apnea    • Wegener's granulomatosis with renal involvement ([place] [address] ) 2015       Past Surgical History:   Procedure Laterality Date   • ESOPHAGOGASTRODUODENOSCOPY  09/11/2015    mild antral gastritis   • GASTRIC STIMULATOR IMPLANT SURGERY  06/25/2020   • IN COLONOSCOPY FLX DX W/COLLJ SPEC WHEN PFRMD N/A 12/14/2018    adenoma removed from the transverse, hyperplastic polyp removed from the left colon   • IN ESOPHAGOGASTRODUODENOSCOPY TRANSORAL DIAGNOSTIC N/A 12/14/2018    gastritis and scant coffee-ground material   Biopsies negative for H  pylori   • IN OPEN TX RADIAL&ULNAR SHAFT FX W/FIXJ RADIUS&ULNA Left 6/23/2022    Procedure: OPEN REDUCTION W/ INTERNAL FIXATION (ORIF) RADIUS / ULNA ([facility]);   Surgeon: Cesia Woodard MD;  Location: [facility];  Service: Orthopedics   • RELEASE SCAR CONTRACTURE / GRAFT REPAIRS OF HAND Bilateral    • UPPER GASTROINTESTINAL ENDOSCOPY  12/26/2019     Sherri  Botox to the pylorus       Family History   Problem Relation Age of Onset   • Arthritis Mother    • Depression Mother    • Diabetes Mother    • Mental illness Mother    • Migraines Mother    • No Known Problems Father    • Colon cancer Neg Hx    • Drug abuse Neg Hx         mother father   • Mental illness Neg Hx         disorder, mother father   • Cancer Neg Hx    • Breast cancer Neg Hx        Social History     Occupational History   • Occupation: disability   Tobacco Use   • Smoking status: Former     Packs/day: 1 00     Years: 10 00     Pack years: 10 00     Types: Cigarettes     Quit date: 2011     Years since quittin 0   • Smokeless tobacco: Never   • Tobacco comments:     Stopped smoking 11 years ago   Vaping Use   • Vaping Use: Never used   Substance and Sexual Activity   • Alcohol use: Never   • Drug use: Yes     Types: Marijuana     Comment: marijuana daily   • Sexual activity: Not Currently     Partners: Male     Birth control/protection: None       Current Outpatient Medications on File Prior to Visit   Medication Sig   • acetaminophen (TYLENOL) 500 mg tablet Take 2 tablets (1,000 mg total) by mouth every 8 (eight) hours as needed for mild pain   • al mag oxide-diphenhydramine-lidocaine viscous (MAGIC MOUTHWASH) 1:1:1 suspension Swish and spit 10 mL every 4 (four) hours as needed for mouth pain or discomfort   • albuterol (2 5 mg/3 mL) 0 083 % nebulizer solution USE 1 VIAL VIA NEBULIZER EVERY 6 HOURS AS NEEDED FOR WHEEZING OR SHORTNESS OF BREATH   • albuterol (PROVENTIL HFA,VENTOLIN HFA) 90 mcg/act inhaler INHALE 2 PUFFS BY MOUTH EVERY 6 HOURS AS NEEDED FOR WHEEZING OR SHORTNESS OF BREATH   • Alcohol Swabs (Alcohol Pads) 70 % PADS Use 4 (four) times a day   • amphetamine-dextroamphetamine (ADDERALL XR) 20 MG 24 hr capsule Take 1 capsule (20 mg total) by mouth 2 (two) times a day Max Daily Amount: 40 mg   • Blood Glucose Monitoring Suppl (FreeStyle Lite) DEIRDRE Use daily   • Blood Pressure Monitor KIT Use daily to check blood pressure     • buPROPion (WELLBUTRIN XL) 300 mg 24 hr tablet TAKE ONE TABLET BY MOUTH ONCE DAILY IN THE MORNING   • Calcium 600 1500 (600 Ca) MG TABS    • calcium carbonate (OS-ALISIA) 600 MG tablet Take 1 tablet (600 mg total) by mouth daily   • Cholecalciferol (Vitamin D) 50 MCG (2000 UT) tablet Take 1 tablet (2,000 Units total) by mouth daily   • clobetasol (TEMOVATE) 0 05 % cream APPLY TOPICALLY TO AFFECTED AREA TWICE A DAY   • Comfort Touch Insulin Pen Need 33G X 6 MM MISC USE TO INJECT INSULIN FOUR TIMES A DAY ( WITH MEALS AND AT BEDTIME )   • Diclofenac Sodium (VOLTAREN) 1 % APPLY 2GM TOPICALLY FOUR TIMES A DAY   • Easy Comfort Lancets MISC USE TO TEST THE BLOOD SUGAR THREE TIMES A DAY   • glucose blood (FREESTYLE LITE) test strip USE TO TEST THE BLOOD SUGAR THREE TIMES A DAY AS DIRECTED   • guaiFENesin (ROBITUSSIN) 100 MG/5ML oral liquid Take 10 mL (200 mg total) by mouth 3 (three) times a day as needed for cough or congestion   • haloperidol (HALDOL) 5 mg tablet TAKE ONE TABLET BY MOUTH ONCE DAILY AS NEEDED FOR ABDOMINAL PAIN / NAUSEA   • hydrocortisone 2 5 % cream Apply topically 2 (two) times a day as needed for rash   • insulin glargine (Toujeo Max SoloStar) 300 units/mL CONCENTRATED U-300 injection pen (2-unit dial) Inject 22 Units under the skin daily   • lidocaine (LMX) 4 % cream Apply topically as needed for mild pain   • lidocaine (XYLOCAINE) 5 % ointment APPLY TOPICALLY 2GM TWICE A DAY TO AFFECTED AREAS AS NEEDED FOR MILD PAIN (Patient taking differently: Lidocaine patches)   • Linzess 72 MCG CAPS TAKE ONE CAPSULE BY MOUTH ONCE DAILY   • naloxone (Narcan) 4 mg/0 1 mL nasal spray ADMINISTER 1 SPRAY IN ONE NOSTRIL IF NO REPONSE AFTER 2-3 MINUTES SPRAY INTO OTHER NOSTRIL WITH NEW spray   • NON FORMULARY Medical [facility]   • NovoLOG FlexPen 100 units/mL injection pen INJECT 6 UNITS UNDER THE SKIN THREE TIMES A DAY WITH MEALS   • nystatin-triamcinolone (MYCOLOG-II) ointment Apply topically 2 (two) times a day   • ondansetron (Zofran ODT) 8 mg disintegrating tablet Take 1 tablet (8 mg total) by mouth every 8 (eight) hours as needed for nausea or vomiting   • oxyCODONE (Roxicodone) 15 mg immediate release tablet Take 1 tablet (15 mg total) by mouth every 4 (four) hours as needed for moderate pain Max Daily Amount: 90 mg   • pantoprazole (PROTONIX) 40 mg tablet Take 1 tablet (40 mg total) by mouth 2 (two) times a day before meals   • pregabalin (LYRICA) 100 mg capsule Take 1 capsule (100 mg total) by mouth 3 (three) times a day   • promethazine (PHENERGAN) 25 mg tablet Take 1 tablet (25 mg total) by mouth every 6 (six) hours as needed for nausea or vomiting   • Restasis 0 05 % ophthalmic emulsion    • scopolamine (TRANSDERM-SCOP) 1 5 mg/3 days TD 72 hr patch Place 1 patch on the skin every third day   • Symbicort 80-4 5 MCG/ACT inhaler INHALE 2 PUFFS BY MOUTH TWICE A DAY RINSE MOUTH AFTER USE   • TiZANidine (ZANAFLEX) 4 MG capsule TAKE ONE CAPSULE BY MOUTH THREE TIMES A DAY   • [DISCONTINUED] pregabalin (LYRICA) 25 mg capsule Take 1 capsule (25 mg total) by mouth 2 (two) times a day   • dicyclomine (BENTYL) 20 mg tablet Take 1 tablet (20 mg total) by mouth in the morning and 1 tablet (20 mg total) in the evening  Do all this for 7 days  No current facility-administered medications on file prior to visit         Allergies   Allergen Reactions   • Prozac [Fluoxetine Hcl]      SI   • Bactrim [Sulfamethoxazole-Trimethoprim]      Pt "They think that is what cause the pancreatitis"    • Flagyl [Metronidazole] Diarrhea and Abdominal Pain   • Lamictal [Lamotrigine] GI Intolerance   • Lithium Other (See Comments)   • Ibuprofen    • Lexapro [Escitalopram Oxalate] Rash   • Navane [Thiothixene]      SI   • Other      "novaine?" antipsychotic       Physical Exam:    BP 98/54   Pulse 60   Ht 5' 2" (1 575 m)   LMP  (LMP Unknown)   SpO2 95%   BMI 26 01 kg/m²     Constitutional:normal, well developed, well nourished, alert, in no distress and non-toxic and no overt pain behavior    Eyes:anicteric  HEENT:grossly intact  Neck:supple, symmetric, trachea midline and no masses   Pulmonary:even and unlabored  Cardiovascular:No edema or pitting edema present  Skin:Normal without rashes or lesions and well hydrated  Psychiatric:Mood and affect appropriate  Neurologic:Cranial Nerves II-XII grossly intact  Musculoskeletal:in wheelchair bilateral: + Deandre finger sign  +Gaenslen's test+ Posterior pelvic pain provocation    Imaging

## 2023-01-10 NOTE — PATIENT INSTRUCTIONS
Sacroiliac Joint Injection   WHAT YOU NEED TO KNOW:   What do I need to know about a sacroiliac joint injection? A sacroiliac (SI) joint injection is done to diagnose or treat pain from sacroiliac joint syndrome  The pain caused by this syndrome may be felt in your lower back, buttocks, groin, and your thigh  How do I prepare for an SI injection? Your healthcare provider will ask you to not take any pain medicine the day of the injection  Ask him if there are any other medicines you should not take  You will need to find someone to drive you home after your procedure  What will happen during the SI injection? You will be awake for your injection  You may be given calming medicine if you are anxious  You will lie on your stomach with a pillow under your abdomen  Your healthcare provider will give you an injection of medicine to numb the area  He may use an x-ray, ultrasound, or CT scan to find the area to inject  You may also be given an injection of contrast material to help your SI joint show up better  Then, your healthcare provider will inject local anesthesia, antiinflammatory medicine, or both into your SI joint  Healthcare providers will watch you closely for any problems for up to 30 minutes after your injection  Your healthcare provider will check to see if your pain decreases after the injection  What are the risks of an SI injection? You may have some weakness for a short time after your injection  The SI injection can cause bleeding, infection, and pain  It can also cause temporary weakness in your leg and problems urinating  You may have an allergic reaction to the medicine that is injected into your SI joint  Your pain may return and you may need more treatment  CARE AGREEMENT:   You have the right to help plan your care  Learn about your health condition and how it may be treated  Discuss treatment options with your healthcare providers to decide what care you want to receive   You always have the right to refuse treatment  The above information is an  only  It is not intended as medical advice for individual conditions or treatments  Talk to your doctor, nurse or pharmacist before following any medical regimen to see if it is safe and effective for you  © Copyright [employer] 2022 Information is for End User's use only and may not be sold, redistributed or otherwise used for commercial purposes   All illustrations and images included in CareNotes® are the copyrighted property of A D A M , Inc  or [address] [place] [address]

## 2023-01-19 DIAGNOSIS — M31.31 GRANULOMATOSIS WITH POLYANGIITIS WITH RENAL INVOLVEMENT (HCC): ICD-10-CM

## 2023-01-19 DIAGNOSIS — G89.4 CHRONIC PAIN SYNDROME: ICD-10-CM

## 2023-01-19 RX ORDER — OXYCODONE HYDROCHLORIDE 15 MG/1
15 TABLET ORAL EVERY 4 HOURS PRN
Qty: 180 TABLET | Refills: 0 | Status: SHIPPED | OUTPATIENT
Start: 2023-01-19

## 2023-01-25 ENCOUNTER — HOSPITAL ENCOUNTER (OUTPATIENT)
Dept: RADIOLOGY | Facility: MEDICAL CENTER | Age: 53
Discharge: HOME | End: 2023-01-25
Admitting: PHYSICAL MEDICINE & REHABILITATION

## 2023-01-25 VITALS
DIASTOLIC BLOOD PRESSURE: 75 MMHG | TEMPERATURE: 97.1 F | SYSTOLIC BLOOD PRESSURE: 120 MMHG | RESPIRATION RATE: 20 BRPM | HEART RATE: 58 BPM | OXYGEN SATURATION: 100 %

## 2023-01-25 DIAGNOSIS — M46.1 BILATERAL SACROILIITIS (HCC): ICD-10-CM

## 2023-01-25 RX ORDER — BUPIVACAINE HCL/PF 2.5 MG/ML
3 VIAL (ML) INJECTION ONCE
Status: COMPLETED | OUTPATIENT
Start: 2023-01-25 | End: 2023-01-25

## 2023-01-25 RX ORDER — METHYLPREDNISOLONE ACETATE 40 MG/ML
80 INJECTION, SUSPENSION INTRA-ARTICULAR; INTRALESIONAL; INTRAMUSCULAR; PARENTERAL; SOFT TISSUE ONCE
Status: COMPLETED | OUTPATIENT
Start: 2023-01-25 | End: 2023-01-25

## 2023-01-25 RX ADMIN — Medication 3 ML: at 09:10

## 2023-01-25 RX ADMIN — METHYLPREDNISOLONE ACETATE 80 MG: 40 INJECTION, SUSPENSION INTRA-ARTICULAR; INTRALESIONAL; INTRAMUSCULAR; SOFT TISSUE at 09:10

## 2023-01-25 RX ADMIN — IOHEXOL 1 ML: 300 INJECTION, SOLUTION INTRAVENOUS at 09:10

## 2023-01-25 NOTE — DISCHARGE INSTRUCTIONS
Steroid Joint Injection   WHAT YOU NEED TO KNOW:   A steroid joint injection is a procedure to inject steroid medicine into a joint  Steroid medicine decreases pain and inflammation  The injection may also contain an anesthetic (numbing medicine) to decrease pain  It may be done to treat conditions such as arthritis, gout, or carpal tunnel syndrome  The injections may be given in your knee, ankle, shoulder, elbow, wrist, ankle or sacroiliac joint  Do not apply heat to any area that is numb  If you have discomfort or soreness at the injection site, you may apply ice today, 20 minutes on and 20 minutes off  Tomorrow you may use ice or warm, moist heat  Do not apply ice or heat directly to the skin  You may have an increase or change in the discomfort for 36-48 hours after your treatment  Apply ice and continue with any pain medicine you have been prescribed  Do not do anything strenuous today  You may shower, but no tub baths or hot tubs today  You may resume your normal activities tomorrow, but do not “overdo it”  Resume normal activities slowly when you are feeling better  If you experience redness, drainage or swelling at the injection site, or if you develop a fever above 100 degrees, please call [facility] at [phone] or go to the Emergency Room  Continue to take all routine medicines prescribed by your primary care physician unless otherwise instructed by our staff  Most blood thinners should be started again according to your regularly scheduled dosing  If you have any questions, please give our office a call  As no general anesthesia was used in today's procedure, you should not experience any side effects related to anesthesia  If you are diabetic, the steroids used in today's injection may temporarily increase your blood sugar levels after the first few days after your injection   Please keep a close eye on your sugars and alert the doctor who manages your diabetes if your sugars are significantly high from your baseline or you are symptomatic  If you have a problem specifically related to your procedure, please call our office at [phone]  Problems not related to your procedure should be directed to your primary care physician

## 2023-01-25 NOTE — H&P
History of Present Illness: The patient is a 46 y o  female who presents with complaints of bilateral lower back pain    Past Medical History:   Diagnosis Date   • ADHD    • Anemia of chronic disease    • Anxiety    • Arthritis ? • Asthma    • Bipolar disorder ([facility])    • Borderline personality disorder ([facility])    • Cataplexy    • Chronic abdominal pain    • Chronic kidney disease ? • CKD (chronic kidney disease) stage 3, GFR 30-59 ml/min ([facility])    • Cushing syndrome ([facility])    • Depression ? • Diabetes mellitus ([facility])    • DVT (deep venous thrombosis) ([facility])    • GERD (gastroesophageal reflux disease)    • Headache(784 0) 3 months   • History of acute pancreatitis     felt secondary to Bactrim   • History of transfusion    • Hypertension    • Liver disease     fatty liver   • Microscopic polyangiitis (HCC)    • Ovarian cyst    • PTSD (post-traumatic stress disorder)    • Self-inflicted injury     self inflicted skin wounds   • Sleep apnea    • Wegener's granulomatosis with renal involvement ([facility]) 2015       Past Surgical History:   Procedure Laterality Date   • ESOPHAGOGASTRODUODENOSCOPY  09/11/2015    mild antral gastritis   • GASTRIC STIMULATOR IMPLANT SURGERY  06/25/2020   • ME COLONOSCOPY FLX DX W/COLLJ SPEC WHEN PFRMD N/A 12/14/2018    adenoma removed from the transverse, hyperplastic polyp removed from the left colon   • ME ESOPHAGOGASTRODUODENOSCOPY TRANSORAL DIAGNOSTIC N/A 12/14/2018    gastritis and scant coffee-ground material   Biopsies negative for H  pylori   • ME OPEN TX RADIAL&ULNAR SHAFT FX W/FIXJ RADIUS&ULNA Left 6/23/2022    Procedure: OPEN REDUCTION W/ INTERNAL FIXATION (ORIF) RADIUS / ULNA ([facility]RIST); Surgeon: Nataly Ventura MD;  Location: [facility];  Service: Orthopedics   • RELEASE SCAR CONTRACTURE / GRAFT REPAIRS OF HAND Bilateral    • UPPER GASTROINTESTINAL ENDOSCOPY  12/26/2019    Dr Dequan Farmer   Botox to the pylorus         Current Outpatient Medications:   •  oxyCODONE (Roxicodone) 15 mg immediate release tablet, Take 1 tablet (15 mg total) by mouth every 4 (four) hours as needed for moderate pain Max Daily Amount: 90 mg, Disp: 180 tablet, Rfl: 0  •  acetaminophen (TYLENOL) 500 mg tablet, Take 2 tablets (1,000 mg total) by mouth every 8 (eight) hours as needed for mild pain, Disp: 60 tablet, Rfl: 1  •  al mag oxide-diphenhydramine-lidocaine viscous (MAGIC MOUTHWASH) 1:1:1 suspension, Swish and spit 10 mL every 4 (four) hours as needed for mouth pain or discomfort, Disp: 90 mL, Rfl: 2  •  albuterol (2 5 mg/3 mL) 0 083 % nebulizer solution, USE 1 VIAL VIA NEBULIZER EVERY 6 HOURS AS NEEDED FOR WHEEZING OR SHORTNESS OF BREATH, Disp: 90 mL, Rfl: 0  •  albuterol (PROVENTIL HFA,VENTOLIN HFA) 90 mcg/act inhaler, INHALE 2 PUFFS BY MOUTH EVERY 6 HOURS AS NEEDED FOR WHEEZING OR SHORTNESS OF BREATH, Disp: 18 g, Rfl: 1  •  Alcohol Swabs (Alcohol Pads) 70 % PADS, Use 4 (four) times a day, Disp: 400 each, Rfl: 2  •  amphetamine-dextroamphetamine (ADDERALL XR) 20 MG 24 hr capsule, Take 1 capsule (20 mg total) by mouth 2 (two) times a day Max Daily Amount: 40 mg, Disp: 60 capsule, Rfl: 0  •  Blood Glucose Monitoring Suppl (FreeStyle Lite) DEIRDRE, Use daily, Disp: 1 each, Rfl: 0  •  Blood Pressure Monitor KIT, Use daily to check blood pressure , Disp: 1 kit, Rfl: 0  •  buPROPion (WELLBUTRIN XL) 300 mg 24 hr tablet, TAKE ONE TABLET BY MOUTH ONCE DAILY IN THE MORNING, Disp: 30 tablet, Rfl: 0  •  Calcium 600 1500 (600 Ca) MG TABS, , Disp: , Rfl:   •  calcium carbonate (OS-ALISIA) 600 MG tablet, Take 1 tablet (600 mg total) by mouth daily, Disp: 90 tablet, Rfl: 1  •  Cholecalciferol (Vitamin D) 50 MCG (2000 UT) tablet, Take 1 tablet (2,000 Units total) by mouth daily, Disp: 90 tablet, Rfl: 1  •  clobetasol (TEMOVATE) 0 05 % cream, APPLY TOPICALLY TO AFFECTED AREA TWICE A DAY, Disp: 60 g, Rfl: 0  •  Comfort Touch Insulin Pen Need 33G X 6 MM MISC, USE TO INJECT INSULIN FOUR TIMES A DAY ( WITH MEALS AND AT BEDTIME ), Disp: 100 each, Rfl: 10  •  Diclofenac Sodium (VOLTAREN) 1 %, APPLY 2GM TOPICALLY FOUR TIMES A DAY, Disp: 200 g, Rfl: 2  •  dicyclomine (BENTYL) 20 mg tablet, Take 1 tablet (20 mg total) by mouth in the morning and 1 tablet (20 mg total) in the evening  Do all this for 7 days  , Disp: 20 tablet, Rfl: 0  •  Easy Comfort Lancets MISC, USE TO TEST THE BLOOD SUGAR THREE TIMES A DAY, Disp: 100 each, Rfl: 6  •  glucose blood (FREESTYLE LITE) test strip, USE TO TEST THE BLOOD SUGAR THREE TIMES A DAY AS DIRECTED, Disp: 100 strip, Rfl: 10  •  guaiFENesin (ROBITUSSIN) 100 MG/5ML oral liquid, Take 10 mL (200 mg total) by mouth 3 (three) times a day as needed for cough or congestion, Disp: 473 mL, Rfl: 0  •  haloperidol (HALDOL) 5 mg tablet, TAKE ONE TABLET BY MOUTH ONCE DAILY AS NEEDED FOR ABDOMINAL PAIN / NAUSEA, Disp: 10 tablet, Rfl: 0  •  hydrocortisone 2 5 % cream, Apply topically 2 (two) times a day as needed for rash, Disp: 30 g, Rfl: 1  •  insulin glargine (Toujeo Max SoloStar) 300 units/mL CONCENTRATED U-300 injection pen (2-unit dial), Inject 22 Units under the skin daily, Disp: 9 mL, Rfl: 1  •  lidocaine (LMX) 4 % cream, Apply topically as needed for mild pain, Disp: 30 g, Rfl: 0  •  lidocaine (XYLOCAINE) 5 % ointment, APPLY TOPICALLY 2GM TWICE A DAY TO AFFECTED AREAS AS NEEDED FOR MILD PAIN (Patient taking differently: Lidocaine patches), Disp: 250 g, Rfl: 8  •  Linzess 72 MCG CAPS, TAKE ONE CAPSULE BY MOUTH ONCE DAILY, Disp: 90 capsule, Rfl: 10  •  naloxone (Narcan) 4 mg/0 1 mL nasal spray, ADMINISTER 1 SPRAY IN ONE NOSTRIL IF NO REPONSE AFTER 2-3 MINUTES SPRAY INTO OTHER NOSTRIL WITH NEW spray, Disp: 2 each, Rfl: 2  •  NON FORMULARY, Medical [facility], Disp: , Rfl:   •  nortriptyline (PAMELOR) 10 mg capsule, Take 1 capsule (10 mg total) by mouth daily at bedtime, Disp: 30 capsule, Rfl: 2  •  NovoLOG FlexPen 100 units/mL injection pen, INJECT 6 UNITS UNDER THE SKIN THREE TIMES A DAY WITH MEALS, Disp: 15 mL, Rfl: 1  • nystatin-triamcinolone (MYCOLOG-II) ointment, Apply topically 2 (two) times a day, Disp: 30 g, Rfl: 1  •  ondansetron (Zofran ODT) 8 mg disintegrating tablet, Take 1 tablet (8 mg total) by mouth every 8 (eight) hours as needed for nausea or vomiting, Disp: 30 tablet, Rfl: 2  •  pantoprazole (PROTONIX) 40 mg tablet, Take 1 tablet (40 mg total) by mouth 2 (two) times a day before meals, Disp: 180 tablet, Rfl: 1  •  pregabalin (LYRICA) 100 mg capsule, Take 1 capsule (100 mg total) by mouth 3 (three) times a day, Disp: 90 capsule, Rfl: 1  •  promethazine (PHENERGAN) 25 mg tablet, Take 1 tablet (25 mg total) by mouth every 6 (six) hours as needed for nausea or vomiting, Disp: 60 tablet, Rfl: 3  •  Restasis 0 05 % ophthalmic emulsion, , Disp: , Rfl:   •  scopolamine (TRANSDERM-SCOP) 1 5 mg/3 days TD 72 hr patch, Place 1 patch on the skin every third day, Disp: 10 patch, Rfl: 0  •  Symbicort 80-4 5 MCG/ACT inhaler, INHALE 2 PUFFS BY MOUTH TWICE A DAY RINSE MOUTH AFTER USE, Disp: 10 2 g, Rfl: 2  •  TiZANidine (ZANAFLEX) 4 MG capsule, TAKE ONE CAPSULE BY MOUTH THREE TIMES A DAY, Disp: 90 capsule, Rfl: 1  No current facility-administered medications for this encounter      Allergies   Allergen Reactions   • Prozac [Fluoxetine Hcl]      SI   • Bactrim [Sulfamethoxazole-Trimethoprim]      Pt "They think that is what cause the pancreatitis"    • Flagyl [Metronidazole] Diarrhea and Abdominal Pain   • Lamictal [Lamotrigine] GI Intolerance   • Lithium Other (See Comments)   • Ibuprofen    • Lexapro [Escitalopram Oxalate] Rash   • Navane [Thiothixene]      SI   • Other      "novaine?" antipsychotic       Physical Exam:   Vitals:    01/25/23 0859   BP: 96/62   Pulse: 64   Resp: 18   Temp: (!) 97 1 °F (36 2 °C)   SpO2: 97%     General: Awake, Alert, Oriented x 3, Mood and affect appropriate  Respiratory: Respirations even and unlabored  Cardiovascular: Peripheral pulses intact; no edema  Musculoskeletal Exam: bilateral lower back pain    ASA Score: 2    Patient/Chart Verification  Patient ID Verified: Verbal  ID Band Applied: No  Consents Confirmed: Procedural, To be obtained in the Pre-Procedure area  H&P( within 30 days) Verified: To be obtained in the Pre-Procedure area  Interval H&P(within 24 hr) Complete (required for Outpatients and Surgery Admit only): To be obtained in the Pre-Procedure area  Allergies Reviewed: Yes  Anticoag/NSAID held?: NA  Currently on antibiotics?: No  Pregnancy denied?: NA    Assessment:   1   Bilateral sacroiliitis (HCC)        Plan: B/L SIJ

## 2023-01-31 DIAGNOSIS — F32.A DEPRESSION, UNSPECIFIED DEPRESSION TYPE: ICD-10-CM

## 2023-02-01 ENCOUNTER — TELEPHONE (OUTPATIENT)
Dept: RADIOLOGY | Facility: MEDICAL CENTER | Age: 53
End: 2023-02-01

## 2023-02-01 ENCOUNTER — TELEPHONE (OUTPATIENT)
Dept: LAB | Facility: HOSPITAL | Age: 53
End: 2023-02-01

## 2023-02-01 RX ORDER — BUPROPION HYDROCHLORIDE 300 MG/1
TABLET ORAL
Qty: 30 TABLET | Refills: 0 | Status: SHIPPED | OUTPATIENT
Start: 2023-02-01 | End: 2023-02-11 | Stop reason: SDUPTHER

## 2023-02-02 ENCOUNTER — TELEPHONE (OUTPATIENT)
Dept: NEPHROLOGY | Facility: CLINIC | Age: 53
End: 2023-02-02

## 2023-02-03 ENCOUNTER — APPOINTMENT (OUTPATIENT)
Dept: LAB | Facility: CLINIC | Age: 53
End: 2023-02-03

## 2023-02-03 DIAGNOSIS — R80.1 PERSISTENT PROTEINURIA: ICD-10-CM

## 2023-02-03 DIAGNOSIS — N18.4 CHRONIC KIDNEY DISEASE, STAGE 4 (SEVERE) (HCC): ICD-10-CM

## 2023-02-03 LAB
ANION GAP SERPL CALCULATED.3IONS-SCNC: 3 MMOL/L (ref 4–13)
BACTERIA UR QL AUTO: ABNORMAL /HPF
BILIRUB UR QL STRIP: NEGATIVE
BUN SERPL-MCNC: 46 MG/DL (ref 5–25)
CALCIUM SERPL-MCNC: 9.2 MG/DL (ref 8.3–10.1)
CHLORIDE SERPL-SCNC: 111 MMOL/L (ref 96–108)
CLARITY UR: CLEAR
CO2 SERPL-SCNC: 25 MMOL/L (ref 21–32)
COLOR UR: ABNORMAL
CREAT SERPL-MCNC: 1.81 MG/DL (ref 0.6–1.3)
ERYTHROCYTE [DISTWIDTH] IN BLOOD BY AUTOMATED COUNT: 13.2 % (ref 11.6–15.1)
GFR SERPL CREATININE-BSD FRML MDRD: 31 ML/MIN/1.73SQ M
GLUCOSE P FAST SERPL-MCNC: 88 MG/DL (ref 65–99)
GLUCOSE UR STRIP-MCNC: NEGATIVE MG/DL
HCT VFR BLD AUTO: 42.2 % (ref 34.8–46.1)
HGB BLD-MCNC: 13.2 G/DL (ref 11.5–15.4)
HGB UR QL STRIP.AUTO: NEGATIVE
HYALINE CASTS #/AREA URNS LPF: ABNORMAL /LPF
KETONES UR STRIP-MCNC: ABNORMAL MG/DL
LEUKOCYTE ESTERASE UR QL STRIP: ABNORMAL
MCH RBC QN AUTO: 30.3 PG (ref 26.8–34.3)
MCHC RBC AUTO-ENTMCNC: 31.3 G/DL (ref 31.4–37.4)
MCV RBC AUTO: 97 FL (ref 82–98)
NITRITE UR QL STRIP: NEGATIVE
NON-SQ EPI CELLS URNS QL MICRO: ABNORMAL /HPF
PH UR STRIP.AUTO: 6 [PH]
PHOSPHATE SERPL-MCNC: 3.3 MG/DL (ref 2.7–4.5)
PLATELET # BLD AUTO: 230 THOUSANDS/UL (ref 149–390)
PMV BLD AUTO: 11.9 FL (ref 8.9–12.7)
POTASSIUM SERPL-SCNC: 4.6 MMOL/L (ref 3.5–5.3)
PROT UR STRIP-MCNC: ABNORMAL MG/DL
RBC # BLD AUTO: 4.36 MILLION/UL (ref 3.81–5.12)
RBC #/AREA URNS AUTO: ABNORMAL /HPF
SODIUM SERPL-SCNC: 139 MMOL/L (ref 135–147)
SP GR UR STRIP.AUTO: 1.02 (ref 1–1.03)
UROBILINOGEN UR STRIP-ACNC: 2 MG/DL
WBC # BLD AUTO: 5.8 THOUSAND/UL (ref 4.31–10.16)
WBC #/AREA URNS AUTO: ABNORMAL /HPF

## 2023-02-04 DIAGNOSIS — F31.9 BIPOLAR 1 DISORDER (HCC): ICD-10-CM

## 2023-02-04 LAB
25(OH)D3 SERPL-MCNC: 30.9 NG/ML (ref 30–100)
CREAT UR-MCNC: 146 MG/DL
PROT UR-MCNC: 82 MG/DL
PROT/CREAT UR: 0.56 MG/G{CREAT} (ref 0–0.1)
PTH-INTACT SERPL-MCNC: 69.5 PG/ML (ref 18.4–80.1)

## 2023-02-04 RX ORDER — DEXTROAMPHETAMINE SACCHARATE, AMPHETAMINE ASPARTATE MONOHYDRATE, DEXTROAMPHETAMINE SULFATE AND AMPHETAMINE SULFATE 5; 5; 5; 5 MG/1; MG/1; MG/1; MG/1
20 CAPSULE, EXTENDED RELEASE ORAL 2 TIMES DAILY
Qty: 60 CAPSULE | Refills: 0 | Status: SHIPPED | OUTPATIENT
Start: 2023-02-04

## 2023-02-07 ENCOUNTER — TELEPHONE (OUTPATIENT)
Dept: NEPHROLOGY | Facility: CLINIC | Age: 53
End: 2023-02-07

## 2023-02-07 LAB
B2 GLYCOPROT1 IGA SERPL IA-ACNC: 2.3
B2 GLYCOPROT1 IGG SERPL IA-ACNC: 1.2
B2 GLYCOPROT1 IGM SERPL IA-ACNC: 2.7
CARDIOLIPIN IGA SER IA-ACNC: 3.4
CARDIOLIPIN IGG SER IA-ACNC: 1
CARDIOLIPIN IGM SER IA-ACNC: 2.5
CCP AB SER IA-ACNC: 1.7

## 2023-02-07 NOTE — TELEPHONE ENCOUNTER
Appointment Confirmation   Person confirmed appointment with  If not patient, name of the person Patient    Date and time of appointment 9 2/8    Patient acknowledged and will be at appointment? yes    Did you advise the patient that they will need a urine sample if they are a new patient?  N/A    Did you advise the patient to bring their current medications for verification? (including any OTC) Yes    Additional Information

## 2023-02-08 ENCOUNTER — OFFICE VISIT (OUTPATIENT)
Dept: NEPHROLOGY | Facility: CLINIC | Age: 53
End: 2023-02-08

## 2023-02-08 ENCOUNTER — OFFICE VISIT (OUTPATIENT)
Dept: FAMILY MEDICINE CLINIC | Facility: CLINIC | Age: 53
End: 2023-02-08

## 2023-02-08 VITALS
DIASTOLIC BLOOD PRESSURE: 68 MMHG | BODY MASS INDEX: 27.05 KG/M2 | WEIGHT: 147 LBS | HEIGHT: 62 IN | SYSTOLIC BLOOD PRESSURE: 118 MMHG | HEART RATE: 67 BPM

## 2023-02-08 VITALS
RESPIRATION RATE: 18 BRPM | HEIGHT: 62 IN | WEIGHT: 143 LBS | DIASTOLIC BLOOD PRESSURE: 72 MMHG | OXYGEN SATURATION: 99 % | SYSTOLIC BLOOD PRESSURE: 126 MMHG | HEART RATE: 70 BPM | TEMPERATURE: 98 F | BODY MASS INDEX: 26.31 KG/M2

## 2023-02-08 DIAGNOSIS — N18.32 STAGE 3B CHRONIC KIDNEY DISEASE (HCC): Primary | ICD-10-CM

## 2023-02-08 DIAGNOSIS — E11.22 TYPE 2 DIABETES MELLITUS WITH STAGE 3 CHRONIC KIDNEY DISEASE, WITH LONG-TERM CURRENT USE OF INSULIN, UNSPECIFIED WHETHER STAGE 3A OR 3B CKD (HCC): ICD-10-CM

## 2023-02-08 DIAGNOSIS — F90.9 ATTENTION DEFICIT HYPERACTIVITY DISORDER (ADHD), UNSPECIFIED ADHD TYPE: ICD-10-CM

## 2023-02-08 DIAGNOSIS — N18.4 BENIGN HYPERTENSION WITH CKD (CHRONIC KIDNEY DISEASE) STAGE IV (HCC): ICD-10-CM

## 2023-02-08 DIAGNOSIS — N18.30 TYPE 2 DIABETES MELLITUS WITH STAGE 3 CHRONIC KIDNEY DISEASE, WITH LONG-TERM CURRENT USE OF INSULIN, UNSPECIFIED WHETHER STAGE 3A OR 3B CKD (HCC): ICD-10-CM

## 2023-02-08 DIAGNOSIS — Z79.4 TYPE 2 DIABETES MELLITUS WITH STAGE 3 CHRONIC KIDNEY DISEASE, WITH LONG-TERM CURRENT USE OF INSULIN, UNSPECIFIED WHETHER STAGE 3A OR 3B CKD (HCC): ICD-10-CM

## 2023-02-08 DIAGNOSIS — Z87.81 S/P ORIF (OPEN REDUCTION INTERNAL FIXATION) FRACTURE: ICD-10-CM

## 2023-02-08 DIAGNOSIS — K21.9 GASTROESOPHAGEAL REFLUX DISEASE, UNSPECIFIED WHETHER ESOPHAGITIS PRESENT: ICD-10-CM

## 2023-02-08 DIAGNOSIS — N18.4 CHRONIC KIDNEY DISEASE, STAGE 4 (SEVERE) (HCC): Chronic | ICD-10-CM

## 2023-02-08 DIAGNOSIS — F31.9 BIPOLAR 1 DISORDER (HCC): ICD-10-CM

## 2023-02-08 DIAGNOSIS — I12.9 BENIGN HYPERTENSION WITH CKD (CHRONIC KIDNEY DISEASE) STAGE IV (HCC): ICD-10-CM

## 2023-02-08 DIAGNOSIS — M31.7 MPA (MICROSCOPIC POLYANGIITIS) (HCC): Chronic | ICD-10-CM

## 2023-02-08 DIAGNOSIS — F32.A DEPRESSION, UNSPECIFIED DEPRESSION TYPE: ICD-10-CM

## 2023-02-08 DIAGNOSIS — Z79.4 TYPE 2 DIABETES MELLITUS WITH STAGE 3 CHRONIC KIDNEY DISEASE, WITH LONG-TERM CURRENT USE OF INSULIN, UNSPECIFIED WHETHER STAGE 3A OR 3B CKD (HCC): Chronic | ICD-10-CM

## 2023-02-08 DIAGNOSIS — Z98.890 S/P ORIF (OPEN REDUCTION INTERNAL FIXATION) FRACTURE: ICD-10-CM

## 2023-02-08 DIAGNOSIS — R80.1 PERSISTENT PROTEINURIA: ICD-10-CM

## 2023-02-08 DIAGNOSIS — G62.9 NEUROPATHY: Primary | Chronic | ICD-10-CM

## 2023-02-08 DIAGNOSIS — E66.3 OVERWEIGHT: ICD-10-CM

## 2023-02-08 DIAGNOSIS — N18.30 TYPE 2 DIABETES MELLITUS WITH STAGE 3 CHRONIC KIDNEY DISEASE, WITH LONG-TERM CURRENT USE OF INSULIN, UNSPECIFIED WHETHER STAGE 3A OR 3B CKD (HCC): Chronic | ICD-10-CM

## 2023-02-08 DIAGNOSIS — J45.30 MILD PERSISTENT ASTHMA WITHOUT COMPLICATION: Chronic | ICD-10-CM

## 2023-02-08 DIAGNOSIS — G89.4 CHRONIC PAIN SYNDROME: Chronic | ICD-10-CM

## 2023-02-08 DIAGNOSIS — K31.84 GASTROPARESIS: Chronic | ICD-10-CM

## 2023-02-08 DIAGNOSIS — F25.9 SCHIZO-AFFECTIVE SCHIZOPHRENIA (HCC): ICD-10-CM

## 2023-02-08 DIAGNOSIS — M85.80 OSTEOPENIA, UNSPECIFIED LOCATION: ICD-10-CM

## 2023-02-08 DIAGNOSIS — E11.22 TYPE 2 DIABETES MELLITUS WITH STAGE 3 CHRONIC KIDNEY DISEASE, WITH LONG-TERM CURRENT USE OF INSULIN, UNSPECIFIED WHETHER STAGE 3A OR 3B CKD (HCC): Chronic | ICD-10-CM

## 2023-02-08 DIAGNOSIS — S62.102S CLOSED FRACTURE OF LEFT WRIST, SEQUELA: ICD-10-CM

## 2023-02-08 DIAGNOSIS — M31.31 GRANULOMATOSIS WITH POLYANGIITIS WITH RENAL INVOLVEMENT (HCC): ICD-10-CM

## 2023-02-08 DIAGNOSIS — M25.532 LEFT WRIST PAIN: ICD-10-CM

## 2023-02-08 NOTE — PROGRESS NOTES
NEPHROLOGY OUTPATIENT PROGRESS NOTE   Jose Mcwilliams 46 y o  female MRN: [id number]  Reason for visit: CKD    ASSESSMENT and PLAN:  1  Chronic kidney disease, stage IIIb, baseline creatinine 1 8-2 0 most recent creatinine 1 8 with an estimated GFR of 31  Etiology secondary to previous microscopic polyangiitis  2  Microscopic polyangiitis/ANCA vasculitis, previous ANCA profile negative  Currently following with rheumatology  3  Diabetes with likely associated diabetic kidney disease previous hemoglobin A1c at 5 7  4  Hypertension, blood pressure acceptable, currently not on any antihypertensive medications  5  Gastroparesis, appears to be under better control with dietary restrictions  6  Osteoporosis, as per rheumatology, to be initiating Prolia    Renal function remains stable  Blood pressure acceptable  No signs of volume overload or retention  Plan on repeating laboratory studies in 6 months with follow-up at that time  SUBJECTIVE / INTERVAL HISTORY:  Has been doing reasonably well  Denies any recent hospitalizations or illnesses  Her gastroparesis is under better control with dietary restrictions  No reports of chest pain shortness of breath or swelling  Most of her complaints are secondary to arthritic pain      OBJECTIVE:  /68 (BP Location: Right arm, Patient Position: Sitting, Cuff Size: Standard)   Pulse 67   Ht 5' 2" (1 575 m)   Wt 66 7 kg (147 lb)   LMP  (LMP Unknown)   BMI 26 89 kg/m²   Vitals:    02/08/23 0848   Weight: 66 7 kg (147 lb)       Physical Exam  Constitutional:       Appearance: She is not ill-appearing  HENT:      Head: Normocephalic and atraumatic  Eyes:      General: No scleral icterus  Cardiovascular:      Rate and Rhythm: Normal rate and regular rhythm  Pulmonary:      Effort: Pulmonary effort is normal       Breath sounds: Normal breath sounds  Abdominal:      General: There is no distension  Palpations: Abdomen is soft     Musculoskeletal:      Right lower leg: No edema  Left lower leg: No edema  Skin:     General: Skin is warm and dry  Neurological:      Mental Status: She is alert and oriented to person, place, and time             Medications:    Current Outpatient Medications:   •  acetaminophen (TYLENOL) 500 mg tablet, Take 2 tablets (1,000 mg total) by mouth every 8 (eight) hours as needed for mild pain, Disp: 60 tablet, Rfl: 1  •  al mag oxide-diphenhydramine-lidocaine viscous (MAGIC MOUTHWASH) 1:1:1 suspension, Swish and spit 10 mL every 4 (four) hours as needed for mouth pain or discomfort, Disp: 90 mL, Rfl: 2  •  albuterol (2 5 mg/3 mL) 0 083 % nebulizer solution, USE 1 VIAL VIA NEBULIZER EVERY 6 HOURS AS NEEDED FOR WHEEZING OR SHORTNESS OF BREATH, Disp: 90 mL, Rfl: 0  •  albuterol (PROVENTIL HFA,VENTOLIN HFA) 90 mcg/act inhaler, INHALE 2 PUFFS BY MOUTH EVERY 6 HOURS AS NEEDED FOR WHEEZING OR SHORTNESS OF BREATH, Disp: 18 g, Rfl: 1  •  Alcohol Swabs (Alcohol Pads) 70 % PADS, Use 4 (four) times a day, Disp: 400 each, Rfl: 2  •  amphetamine-dextroamphetamine (ADDERALL XR) 20 MG 24 hr capsule, Take 1 capsule (20 mg total) by mouth 2 (two) times a day Max Daily Amount: 40 mg, Disp: 60 capsule, Rfl: 0  •  Blood Glucose Monitoring Suppl (FreeStyle Lite) DEIRDRE, Use daily, Disp: 1 each, Rfl: 0  •  Blood Pressure Monitor KIT, Use daily to check blood pressure , Disp: 1 kit, Rfl: 0  •  buPROPion (WELLBUTRIN XL) 300 mg 24 hr tablet, TAKE ONE TABLET BY MOUTH ONCE DAILY IN THE MORNING, Disp: 30 tablet, Rfl: 0  •  Calcium 600 1500 (600 Ca) MG TABS, , Disp: , Rfl:   •  calcium carbonate (OS-ALISIA) 600 MG tablet, Take 1 tablet (600 mg total) by mouth daily, Disp: 90 tablet, Rfl: 1  •  Cholecalciferol (Vitamin D) 50 MCG (2000 UT) tablet, Take 1 tablet (2,000 Units total) by mouth daily, Disp: 90 tablet, Rfl: 1  •  clobetasol (TEMOVATE) 0 05 % cream, APPLY TOPICALLY TO AFFECTED AREA TWICE A DAY, Disp: 60 g, Rfl: 0  •  Comfort Touch Insulin Pen Need 33G X 6 MM MISC, USE TO INJECT INSULIN FOUR TIMES A DAY ( WITH MEALS AND AT BEDTIME ), Disp: 100 each, Rfl: 10  •  Diclofenac Sodium (VOLTAREN) 1 %, APPLY 2GM TOPICALLY FOUR TIMES A DAY, Disp: 200 g, Rfl: 2  •  Easy Comfort Lancets MISC, USE TO TEST THE BLOOD SUGAR THREE TIMES A DAY, Disp: 100 each, Rfl: 6  •  glucose blood (FREESTYLE LITE) test strip, USE TO TEST THE BLOOD SUGAR THREE TIMES A DAY AS DIRECTED, Disp: 100 strip, Rfl: 10  •  guaiFENesin (ROBITUSSIN) 100 MG/5ML oral liquid, Take 10 mL (200 mg total) by mouth 3 (three) times a day as needed for cough or congestion, Disp: 473 mL, Rfl: 0  •  haloperidol (HALDOL) 5 mg tablet, TAKE ONE TABLET BY MOUTH ONCE DAILY AS NEEDED FOR ABDOMINAL PAIN / NAUSEA, Disp: 10 tablet, Rfl: 0  •  hydrocortisone 2 5 % cream, Apply topically 2 (two) times a day as needed for rash, Disp: 30 g, Rfl: 1  •  insulin glargine (Toujeo Max SoloStar) 300 units/mL CONCENTRATED U-300 injection pen (2-unit dial), Inject 22 Units under the skin daily, Disp: 9 mL, Rfl: 1  •  lidocaine (LMX) 4 % cream, Apply topically as needed for mild pain, Disp: 30 g, Rfl: 0  •  lidocaine (XYLOCAINE) 5 % ointment, APPLY TOPICALLY 2GM TWICE A DAY TO AFFECTED AREAS AS NEEDED FOR MILD PAIN (Patient taking differently: Lidocaine patches), Disp: 250 g, Rfl: 8  •  Linzess 72 MCG CAPS, TAKE ONE CAPSULE BY MOUTH ONCE DAILY, Disp: 90 capsule, Rfl: 10  •  naloxone (Narcan) 4 mg/0 1 mL nasal spray, ADMINISTER 1 SPRAY IN ONE NOSTRIL IF NO REPONSE AFTER 2-3 MINUTES SPRAY INTO OTHER NOSTRIL WITH NEW spray, Disp: 2 each, Rfl: 2  •  NON FORMULARY, Medical [facility], Disp: , Rfl:   •  nortriptyline (PAMELOR) 10 mg capsule, Take 1 capsule (10 mg total) by mouth daily at bedtime, Disp: 30 capsule, Rfl: 2  •  NovoLOG FlexPen 100 units/mL injection pen, INJECT 6 UNITS UNDER THE SKIN THREE TIMES A DAY WITH MEALS, Disp: 15 mL, Rfl: 1  •  nystatin-triamcinolone (MYCOLOG-II) ointment, Apply topically 2 (two) times a day, Disp: 30 g, Rfl: 1  • ondansetron (Zofran ODT) 8 mg disintegrating tablet, Take 1 tablet (8 mg total) by mouth every 8 (eight) hours as needed for nausea or vomiting, Disp: 30 tablet, Rfl: 2  •  oxyCODONE (Roxicodone) 15 mg immediate release tablet, Take 1 tablet (15 mg total) by mouth every 4 (four) hours as needed for moderate pain Max Daily Amount: 90 mg, Disp: 180 tablet, Rfl: 0  •  pantoprazole (PROTONIX) 40 mg tablet, Take 1 tablet (40 mg total) by mouth 2 (two) times a day before meals, Disp: 180 tablet, Rfl: 1  •  pregabalin (LYRICA) 100 mg capsule, Take 1 capsule (100 mg total) by mouth 3 (three) times a day, Disp: 90 capsule, Rfl: 1  •  promethazine (PHENERGAN) 25 mg tablet, Take 1 tablet (25 mg total) by mouth every 6 (six) hours as needed for nausea or vomiting, Disp: 60 tablet, Rfl: 3  •  Restasis 0 05 % ophthalmic emulsion, , Disp: , Rfl:   •  scopolamine (TRANSDERM-SCOP) 1 5 mg/3 days TD 72 hr patch, Place 1 patch on the skin every third day, Disp: 10 patch, Rfl: 0  •  Symbicort 80-4 5 MCG/ACT inhaler, INHALE 2 PUFFS BY MOUTH TWICE A DAY RINSE MOUTH AFTER USE, Disp: 10 2 g, Rfl: 2  •  TiZANidine (ZANAFLEX) 4 MG capsule, TAKE ONE CAPSULE BY MOUTH THREE TIMES A DAY, Disp: 90 capsule, Rfl: 1  •  dicyclomine (BENTYL) 20 mg tablet, Take 1 tablet (20 mg total) by mouth in the morning and 1 tablet (20 mg total) in the evening  Do all this for 7 days  , Disp: 20 tablet, Rfl: 0    Laboratory Results:  Results for orders placed or performed in visit on 02/03/23   Phosphorus   Result Value Ref Range    Phosphorus 3 3 2 7 - 4 5 mg/dL   CBC   Result Value Ref Range    WBC 5 80 4 31 - 10 16 Thousand/uL    RBC 4 36 3 81 - 5 12 Million/uL    Hemoglobin 13 2 11 5 - 15 4 g/dL    Hematocrit 42 2 34 8 - 46 1 %    MCV 97 82 - 98 fL    MCH 30 3 26 8 - 34 3 pg    MCHC 31 3 (L) 31 4 - 37 4 g/dL    RDW 13 2 11 6 - 15 1 %    Platelets 925 412 - 274 Thousands/uL    MPV 11 9 8 9 - 12 7 fL   PTH, intact   Result Value Ref Range    PTH 69 5 18 4 - 80 1 pg/mL   Vitamin D 25 hydroxy   Result Value Ref Range    Vit D, 25-Hydroxy 30 9 30 0 - 100 0 ng/mL   Basic metabolic panel   Result Value Ref Range    Sodium 139 135 - 147 mmol/L    Potassium 4 6 3 5 - 5 3 mmol/L    Chloride 111 (H) 96 - 108 mmol/L    CO2 25 21 - 32 mmol/L    ANION GAP 3 (L) 4 - 13 mmol/L    BUN 46 (H) 5 - 25 mg/dL    Creatinine 1 81 (H) 0 60 - 1 30 mg/dL    Glucose, Fasting 88 65 - 99 mg/dL    Calcium 9 2 8 3 - 10 1 mg/dL    eGFR 31 ml/min/1 73sq m     *Note: Due to a large number of results and/or encounters for the requested time period, some results have not been displayed  A complete set of results can be found in Results Review

## 2023-02-08 NOTE — PROGRESS NOTES
Assessment/Plan:    Type 2 diabetes mellitus with stage 3 chronic kidney disease, with long-term current use of insulin ([facility] [address] )   - Patient has history of type 2 diabetes with long-term use of insulin with associated neuropathy and chronic kidney disease stage 3-4   - Continue Toujeo 22 units daily and NovoLog 6 units, 3 times daily with meals   - Continue checking daily blood sugars  Lab Results   Component Value Date    HGBA1C 5 7 02/28/2022    HGBA1C 5 7 09/29/2021    HGBA1C 6 0 02/15/2021       Asthma  - Stable  Continue Symbicort twice daily, albuterol inhaler as needed, nebulizer as needed  Gastroparesis  - Has placement of gastric stimulator, previously followed by [facility]  - Currently, symptoms have been stable since patient has been very vigilant in watching her diet  Patient notes she has to stay away from salt and acid   -Continue follow-up with gastroenterology as scheduled  Neuropathy  - Patient had previously tried gabapentin, but it caused restless legs syndrome  Patient recently tried Cymbalta, but it caused side effect of dizziness, abdominal pain, vomiting   - Patient is now prescribed Lyrica 100 mg, three times daily, however, patient has now stopped taking it due to low blood pressure readings    -Patient recently started on nortriptyline 10 mg daily at bedtime by pain management, however patient has yet to try it because she is scared of possible side effect of gaining weight  - Patient is scheduled for updated EMG studies of bilateral upper and lower extremities later this year  - Patient is due for neurology follow-up, last seen in April 2021  Updated referral placed previously  Advised to call to schedule an appointment  Chronic pain  - Continue oxycodone 15 mg, every 4 hours as needed  PDMP reviewed  No red flags noted  -Plan for updated UDS at next visit  - Patient has now stopped taking Lyrica due to low blood pressure readings    Nortriptyline 10 mg daily at bedtime has been added as an alternative by pain management, however patient has yet to try taking the medication due to possible side effect of weight gain   -Patient is interested in using a TENS unit  Advised patient to speak with pain management further about this  - Continue follow-up with pain management as scheduled  Chronic kidney disease, stage 4 (severe) ([facility])  Lab Results   Component Value Date    EGFR 31 02/03/2023    EGFR 28 12/20/2022    EGFR 27 12/16/2022    CREATININE 1 81 (H) 02/03/2023    CREATININE 1 97 (H) 12/20/2022    CREATININE 2 03 (H) 12/16/2022   - Continue follow-up with nephrology as scheduled  - Continue to avoid NSAIDs/nephrotoxic medications  MPA (microscopic polyangiitis) ([facility])  - Continue follow-up with rheumatology as scheduled  Bipolar 1 disorder ([facility])  - Stable  Continue Wellbutrin  mg daily and Adderall 20 mg twice daily  Gastroesophageal reflux disease  - Stable  Continue Protonix 40 mg twice daily  - Continue to avoid trigger foods   - Continue follow-up with gastroenterology as scheduled  Attention deficit hyperactivity disorder (ADHD)  - Continue Adderall 20 mg twice daily   - PDMP reviewed  No red flags noted  Schizo-affective schizophrenia ([facility] [address] )  - Stable  Continue Wellbutrin and Adderall  Left wrist pain  -History of closed fracture of left wrist status post S/p ORIF of the left distal radius on 6/23/22 by Dr Dayami Gaston   - Patient now with persistent pain ulnar aspect of left forearm with movement and is now interfering with patient's ADLs  -Recommend patient schedule follow-up with orthopedics for further evaluation  Osteopenia  - Review DEXA bone scan from 08/15/2022  Results show osteopenia     -Continue daily calcium and vitamin-D supplement  - Per chart review, patient was supposed to start Prolia injections every 6 months with rheumatology, however patient notes she has yet to start this    We will message rheumatology for update of Prolia  Diagnoses and all orders for this visit:    Neuropathy    Overweight    Type 2 diabetes mellitus with stage 3 chronic kidney disease, with long-term current use of insulin, unspecified whether stage 3a or 3b CKD ([facility])    Chronic pain syndrome    Left wrist pain  -     Ambulatory Referral to Orthopedic Surgery; Future    S/P ORIF (open reduction internal fixation) fracture  -     Ambulatory Referral to Orthopedic Surgery; Future    Closed fracture of left wrist, sequela  -     Ambulatory Referral to Orthopedic Surgery; Future    Mild persistent asthma without complication    Gastroparesis    Chronic kidney disease, stage 4 (severe) ([facility])    MPA (microscopic polyangiitis) ([facility])    Depression, unspecified depression type  -     buPROPion (WELLBUTRIN XL) 300 mg 24 hr tablet; Take 1 tablet (300 mg total) by mouth every morning    Bipolar 1 disorder ([facility])    Gastroesophageal reflux disease, unspecified whether esophagitis present    Attention deficit hyperactivity disorder (ADHD), unspecified ADHD type    Schizo-affective schizophrenia ([facility] )    Osteopenia, unspecified location        All of patients questions were answered  Patient understands and agrees with the above plan  Return in about 2 months (around 4/8/2023) for Next scheduled follow up [facility] & VICENTE Ogden PA-C  02/08/23  [address] [facility] [place]          Subjective:     Patient ID: Rachel Howell  is a 46 y o  female with known PMH of gastroparesis, GERD, constipation, ADHD, type 2 diabetes mellitus, cataplexy, Wegener's granulomatosis, small fiber neuropathy, bipolar 1 disorder who presents today in office for pain follow-up  - Patient is a 46 y o  female who presents today for pain follow-up     -Patient notes since she underwent left wrist surgery, she has been experiencing pain along the ulnar aspect of her left lower arm    Patient notes the pain occurs with any movement of the left wrist   Patient notes now she is unable to comb her hair or perform other tasks due to the pain  Patient notes she has been wearing a brace which does help with the pain, but she would like it further checked out  Patient notes she does occasionally experience numbness and tingling of her fingertips of the left hand  - Patient notes recently her gastroparesis has been under control because she has been very vigilant as to what she is eating and how much  Patient notes she has to stay away from salt and acid  - Patient notes she is no longer taking Lyrica and instead was prescribed nortriptyline  However, patient notes she is nervous to take it because she saw potential side effects is significant weight gain  Patient is worried if she starts taking it, she will want to start eating more food which will then upset her stomach and laid her back into the hospital     - Patient notes she has only received about 30% improvement of her pain after receiving bilateral SI joint injections  Patient notes she felt >50% improvement from her first injection  Patient notes the injection itself was very painful and she does not wish to have any further injections after this  The following portions of the patient's history were reviewed and updated as appropriate: allergies, current medications, past family history, past medical history, past social history, past surgical history and problem list         Review of Systems   Constitutional: Negative for appetite change, fatigue and fever  HENT: Negative for congestion and sore throat  Eyes: Negative for pain  Respiratory: Negative for cough, chest tightness and shortness of breath  Cardiovascular: Negative for chest pain and palpitations  Gastrointestinal: Negative for abdominal pain, constipation, diarrhea, nausea and vomiting  Genitourinary: Negative for difficulty urinating and dysuria  Musculoskeletal: Positive for arthralgias, back pain, myalgias and neck pain  Skin: Negative for rash  Neurological: Positive for weakness and numbness  Negative for dizziness and headaches  Psychiatric/Behavioral: The patient is not nervous/anxious  BMI Counseling: Body mass index is 26 16 kg/m²  The BMI is above normal  Nutrition recommendations include decreasing portion sizes and encouraging healthy choices of fruits and vegetables  Exercise recommendations include moderate physical activity 150 minutes/week  No pharmacotherapy was ordered  Rationale for BMI follow-up plan is due to patient being overweight or obese  Objective:   Vitals:    02/08/23 1558   BP: 126/72   BP Location: Left arm   Patient Position: Sitting   Cuff Size: Standard   Pulse: 70   Resp: 18   Temp: 98 °F (36 7 °C)   TempSrc: Temporal   SpO2: 99%   Weight: 64 9 kg (143 lb)   Height: 5' 2" (1 575 m)         Physical Exam  Vitals and nursing note reviewed  Constitutional:       General: She is not in acute distress  Appearance: She is well-developed  Comments: Examined in wheelchair   HENT:      Head: Normocephalic and atraumatic  Right Ear: External ear normal       Left Ear: External ear normal       Nose: Nose normal    Eyes:      Conjunctiva/sclera: Conjunctivae normal    Cardiovascular:      Rate and Rhythm: Normal rate and regular rhythm  Pulses: Normal pulses  Heart sounds: Normal heart sounds  Pulmonary:      Effort: Pulmonary effort is normal  No respiratory distress  Breath sounds: Normal breath sounds  No wheezing  Abdominal:      General: Bowel sounds are normal       Palpations: Abdomen is soft  Tenderness: There is no abdominal tenderness  Musculoskeletal:        Arms:       Cervical back: Normal range of motion and neck supple  Skin:     General: Skin is warm and dry  Neurological:      Mental Status: She is alert and oriented to person, place, and time     Psychiatric:         Behavior: Behavior normal

## 2023-02-09 DIAGNOSIS — G89.4 CHRONIC PAIN SYNDROME: ICD-10-CM

## 2023-02-09 RX ORDER — NALOXONE HYDROCHLORIDE 4 MG/.1ML
SPRAY NASAL
Qty: 2 EACH | Refills: 2 | Status: ON HOLD | OUTPATIENT
Start: 2023-02-09

## 2023-02-11 ENCOUNTER — TELEPHONE (OUTPATIENT)
Dept: FAMILY MEDICINE CLINIC | Facility: CLINIC | Age: 53
End: 2023-02-11

## 2023-02-11 PROBLEM — K21.9 GASTROESOPHAGEAL REFLUX DISEASE: Chronic | Status: ACTIVE | Noted: 2018-07-26

## 2023-02-11 PROBLEM — F90.9 ATTENTION DEFICIT HYPERACTIVITY DISORDER (ADHD): Chronic | Status: ACTIVE | Noted: 2020-09-24

## 2023-02-11 PROBLEM — M25.532 LEFT WRIST PAIN: Status: ACTIVE | Noted: 2023-02-11

## 2023-02-11 PROBLEM — N18.4 CHRONIC KIDNEY DISEASE, STAGE 4 (SEVERE) (HCC): Chronic | Status: ACTIVE | Noted: 2022-01-19

## 2023-02-11 PROBLEM — F31.9 BIPOLAR 1 DISORDER (HCC): Chronic | Status: ACTIVE | Noted: 2017-12-21

## 2023-02-11 RX ORDER — BUPROPION HYDROCHLORIDE 300 MG/1
300 TABLET ORAL EVERY MORNING
Qty: 90 TABLET | Refills: 1 | Status: ON HOLD | OUTPATIENT
Start: 2023-02-11

## 2023-02-12 NOTE — ASSESSMENT & PLAN NOTE
- Patient had previously tried gabapentin, but it caused restless legs syndrome  Patient recently tried Cymbalta, but it caused side effect of dizziness, abdominal pain, vomiting   - Patient is now prescribed Lyrica 100 mg, three times daily, however, patient has now stopped taking it due to low blood pressure readings    -Patient recently started on nortriptyline 10 mg daily at bedtime by pain management, however patient has yet to try it because she is scared of possible side effect of gaining weight  - Patient is scheduled for updated EMG studies of bilateral upper and lower extremities later this year  - Patient is due for neurology follow-up, last seen in April 2021  Updated referral placed previously  Advised to call to schedule an appointment

## 2023-02-12 NOTE — ASSESSMENT & PLAN NOTE
- Has placement of gastric stimulator, previously followed by [place]  - Currently, symptoms have been stable since patient has been very vigilant in watching her diet  Patient notes she has to stay away from salt and acid   -Continue follow-up with gastroenterology as scheduled

## 2023-02-12 NOTE — ASSESSMENT & PLAN NOTE
Lab Results   Component Value Date    EGFR 31 02/03/2023    EGFR 28 12/20/2022    EGFR 27 12/16/2022    CREATININE 1 81 (H) 02/03/2023    CREATININE 1 97 (H) 12/20/2022    CREATININE 2 03 (H) 12/16/2022   - Continue follow-up with nephrology as scheduled  - Continue to avoid NSAIDs/nephrotoxic medications

## 2023-02-12 NOTE — ASSESSMENT & PLAN NOTE
- Continue oxycodone 15 mg, every 4 hours as needed  PDMP reviewed  No red flags noted  -Plan for updated UDS at next visit  - Patient has now stopped taking Lyrica due to low blood pressure readings  Nortriptyline 10 mg daily at bedtime has been added as an alternative by pain management, however patient has yet to try taking the medication due to possible side effect of weight gain   -Patient is interested in using a TENS unit  Advised patient to speak with pain management further about this  - Continue follow-up with pain management as scheduled

## 2023-02-12 NOTE — ASSESSMENT & PLAN NOTE
- Review DEXA bone scan from 08/15/2022  Results show osteopenia     -Continue daily calcium and vitamin-D supplement  - Per chart review, patient was supposed to start Prolia injections every 6 months with rheumatology, however patient notes she has yet to start this  We will message rheumatology for update of Prolia

## 2023-02-12 NOTE — ASSESSMENT & PLAN NOTE
- Stable  Continue Protonix 40 mg twice daily  - Continue to avoid trigger foods   - Continue follow-up with gastroenterology as scheduled

## 2023-02-12 NOTE — TELEPHONE ENCOUNTER
Hi! Patient is requesting help with scheduling a follow up appointment with orthopedics, Dr Gera Hernandez  Patient notes they prefer any day of the week and prefers the morning for an appointment, if possible  Could you please further assist patient with this? Thanks!

## 2023-02-12 NOTE — ASSESSMENT & PLAN NOTE
-History of closed fracture of left wrist status post S/p ORIF of the left distal radius on 6/23/22 by Dr Sofia Villalobos   - Patient now with persistent pain ulnar aspect of left forearm with movement and is now interfering with patient's ADLs  -Recommend patient schedule follow-up with orthopedics for further evaluation

## 2023-02-14 PROBLEM — J10.1 INFLUENZA A: Status: RESOLVED | Noted: 2022-12-16 | Resolved: 2023-02-14

## 2023-02-15 ENCOUNTER — TELEPHONE (OUTPATIENT)
Dept: OBGYN CLINIC | Facility: HOSPITAL | Age: 53
End: 2023-02-15

## 2023-02-15 DIAGNOSIS — M25.532 PAIN IN LEFT WRIST: Primary | ICD-10-CM

## 2023-02-15 NOTE — TELEPHONE ENCOUNTER
Spoke with patient who requested I call her back tomorrow to schedule appointment for Prolia injection

## 2023-02-15 NOTE — TELEPHONE ENCOUNTER
Caller: Khoi     Doctor: Gina Anne    Reason for call: Patient said she got the letter of approval for a Prolia injection  She is wondering how she can schedule an appt for this  Please advise       Call back#: [phone]

## 2023-02-16 NOTE — DISCHARGE INSTRUCTIONS
Return to emergency room if symptoms worsen, develop new symptoms, or have concern about progress of your condition  Take all medication as directed  Contact your primary care provider in 48 hours for evaluation of the established treatment plan and discussion on the progress of your condition 
headache

## 2023-02-17 DIAGNOSIS — G89.4 CHRONIC PAIN SYNDROME: ICD-10-CM

## 2023-02-17 DIAGNOSIS — M31.31 GRANULOMATOSIS WITH POLYANGIITIS WITH RENAL INVOLVEMENT (HCC): ICD-10-CM

## 2023-02-19 ENCOUNTER — HOSPITAL ENCOUNTER (OUTPATIENT)
Facility: HOSPITAL | Age: 53
Setting detail: OBSERVATION
Discharge: LEFT BEFORE DISCHARGE | End: 2023-02-20
Attending: EMERGENCY MEDICINE | Admitting: INTERNAL MEDICINE

## 2023-02-19 ENCOUNTER — APPOINTMENT (OUTPATIENT)
Dept: CT IMAGING | Facility: HOSPITAL | Age: 53
End: 2023-02-19

## 2023-02-19 DIAGNOSIS — R03.0 ELEVATED BLOOD PRESSURE READING: ICD-10-CM

## 2023-02-19 DIAGNOSIS — N18.9 CKD (CHRONIC KIDNEY DISEASE): ICD-10-CM

## 2023-02-19 DIAGNOSIS — R10.13 EPIGASTRIC PAIN: Primary | ICD-10-CM

## 2023-02-19 DIAGNOSIS — R11.2 INTRACTABLE NAUSEA AND VOMITING: ICD-10-CM

## 2023-02-19 DIAGNOSIS — K85.90 PANCREATITIS: ICD-10-CM

## 2023-02-19 PROBLEM — R11.10 VOMITING: Status: ACTIVE | Noted: 2022-06-23

## 2023-02-19 LAB
ALBUMIN SERPL BCP-MCNC: 4.2 G/DL (ref 3.5–5)
ALP SERPL-CCNC: 104 U/L (ref 34–104)
ALT SERPL W P-5'-P-CCNC: 12 U/L (ref 7–52)
ANION GAP SERPL CALCULATED.3IONS-SCNC: 7 MMOL/L (ref 4–13)
AST SERPL W P-5'-P-CCNC: 18 U/L (ref 13–39)
ATRIAL RATE: 52 BPM
BACTERIA UR QL AUTO: ABNORMAL /HPF
BASOPHILS # BLD AUTO: 0.02 THOUSANDS/ÂΜL (ref 0–0.1)
BASOPHILS NFR BLD AUTO: 0 % (ref 0–1)
BILIRUB SERPL-MCNC: 0.42 MG/DL (ref 0.2–1)
BILIRUB UR QL STRIP: NEGATIVE
BUN SERPL-MCNC: 30 MG/DL (ref 5–25)
CALCIUM SERPL-MCNC: 9.6 MG/DL (ref 8.4–10.2)
CARDIAC TROPONIN I PNL SERPL HS: <2 NG/L
CHLORIDE SERPL-SCNC: 110 MMOL/L (ref 96–108)
CLARITY UR: CLEAR
CO2 SERPL-SCNC: 23 MMOL/L (ref 21–32)
COLOR UR: YELLOW
CREAT SERPL-MCNC: 1.6 MG/DL (ref 0.6–1.3)
EOSINOPHIL # BLD AUTO: 0.03 THOUSAND/ÂΜL (ref 0–0.61)
EOSINOPHIL NFR BLD AUTO: 0 % (ref 0–6)
ERYTHROCYTE [DISTWIDTH] IN BLOOD BY AUTOMATED COUNT: 13.1 % (ref 11.6–15.1)
GFR SERPL CREATININE-BSD FRML MDRD: 36 ML/MIN/1.73SQ M
GLUCOSE SERPL-MCNC: 129 MG/DL (ref 65–140)
GLUCOSE SERPL-MCNC: 132 MG/DL (ref 65–140)
GLUCOSE SERPL-MCNC: 153 MG/DL (ref 65–140)
GLUCOSE UR STRIP-MCNC: NEGATIVE MG/DL
HCT VFR BLD AUTO: 41.3 % (ref 34.8–46.1)
HGB BLD-MCNC: 13.8 G/DL (ref 11.5–15.4)
HGB UR QL STRIP.AUTO: ABNORMAL
IMM GRANULOCYTES # BLD AUTO: 0.02 THOUSAND/UL (ref 0–0.2)
IMM GRANULOCYTES NFR BLD AUTO: 0 % (ref 0–2)
KETONES UR STRIP-MCNC: NEGATIVE MG/DL
LEUKOCYTE ESTERASE UR QL STRIP: NEGATIVE
LIPASE SERPL-CCNC: 396 U/L (ref 11–82)
LYMPHOCYTES # BLD AUTO: 1.16 THOUSANDS/ÂΜL (ref 0.6–4.47)
LYMPHOCYTES NFR BLD AUTO: 17 % (ref 14–44)
MCH RBC QN AUTO: 31.3 PG (ref 26.8–34.3)
MCHC RBC AUTO-ENTMCNC: 33.4 G/DL (ref 31.4–37.4)
MCV RBC AUTO: 94 FL (ref 82–98)
MONOCYTES # BLD AUTO: 0.44 THOUSAND/ÂΜL (ref 0.17–1.22)
MONOCYTES NFR BLD AUTO: 6 % (ref 4–12)
NEUTROPHILS # BLD AUTO: 5.2 THOUSANDS/ÂΜL (ref 1.85–7.62)
NEUTS SEG NFR BLD AUTO: 77 % (ref 43–75)
NITRITE UR QL STRIP: NEGATIVE
NON-SQ EPI CELLS URNS QL MICRO: ABNORMAL /HPF
NRBC BLD AUTO-RTO: 0 /100 WBCS
P AXIS: 27 DEGREES
PH UR STRIP.AUTO: 6 [PH] (ref 4.5–8)
PLATELET # BLD AUTO: 222 THOUSANDS/UL (ref 149–390)
PMV BLD AUTO: 10 FL (ref 8.9–12.7)
POTASSIUM SERPL-SCNC: 3.8 MMOL/L (ref 3.5–5.3)
PR INTERVAL: 140 MS
PROT SERPL-MCNC: 7.8 G/DL (ref 6.4–8.4)
PROT UR STRIP-MCNC: >=300 MG/DL
QRS AXIS: -18 DEGREES
QRSD INTERVAL: 68 MS
QT INTERVAL: 444 MS
QTC INTERVAL: 412 MS
RBC # BLD AUTO: 4.41 MILLION/UL (ref 3.81–5.12)
RBC #/AREA URNS AUTO: ABNORMAL /HPF
SODIUM SERPL-SCNC: 140 MMOL/L (ref 135–147)
SP GR UR STRIP.AUTO: 1.02 (ref 1–1.03)
T WAVE AXIS: 2 DEGREES
UROBILINOGEN UR QL STRIP.AUTO: 0.2 E.U./DL
VENTRICULAR RATE: 52 BPM
WBC # BLD AUTO: 6.87 THOUSAND/UL (ref 4.31–10.16)
WBC #/AREA URNS AUTO: ABNORMAL /HPF

## 2023-02-19 RX ORDER — SODIUM CHLORIDE, SODIUM LACTATE, POTASSIUM CHLORIDE, CALCIUM CHLORIDE 600; 310; 30; 20 MG/100ML; MG/100ML; MG/100ML; MG/100ML
150 INJECTION, SOLUTION INTRAVENOUS CONTINUOUS
Status: DISCONTINUED | OUTPATIENT
Start: 2023-02-19 | End: 2023-02-20 | Stop reason: HOSPADM

## 2023-02-19 RX ORDER — HYDRALAZINE HYDROCHLORIDE 20 MG/ML
10 INJECTION INTRAMUSCULAR; INTRAVENOUS EVERY 4 HOURS PRN
Status: DISCONTINUED | OUTPATIENT
Start: 2023-02-19 | End: 2023-02-20 | Stop reason: HOSPADM

## 2023-02-19 RX ORDER — METOCLOPRAMIDE HYDROCHLORIDE 5 MG/ML
10 INJECTION INTRAMUSCULAR; INTRAVENOUS ONCE
Status: DISCONTINUED | OUTPATIENT
Start: 2023-02-19 | End: 2023-02-19

## 2023-02-19 RX ORDER — OXYCODONE HYDROCHLORIDE 10 MG/1
20 TABLET ORAL EVERY 4 HOURS PRN
Status: DISCONTINUED | OUTPATIENT
Start: 2023-02-19 | End: 2023-02-20 | Stop reason: HOSPADM

## 2023-02-19 RX ORDER — HYDRALAZINE HYDROCHLORIDE 20 MG/ML
5 INJECTION INTRAMUSCULAR; INTRAVENOUS ONCE
Status: COMPLETED | OUTPATIENT
Start: 2023-02-19 | End: 2023-02-19

## 2023-02-19 RX ORDER — FAMOTIDINE 10 MG/ML
20 INJECTION, SOLUTION INTRAVENOUS ONCE
Status: COMPLETED | OUTPATIENT
Start: 2023-02-19 | End: 2023-02-19

## 2023-02-19 RX ORDER — HYDROMORPHONE HCL/PF 1 MG/ML
1 SYRINGE (ML) INJECTION EVERY 4 HOURS PRN
Status: DISCONTINUED | OUTPATIENT
Start: 2023-02-19 | End: 2023-02-20 | Stop reason: HOSPADM

## 2023-02-19 RX ORDER — ONDANSETRON 2 MG/ML
4 INJECTION INTRAMUSCULAR; INTRAVENOUS EVERY 4 HOURS PRN
Status: DISCONTINUED | OUTPATIENT
Start: 2023-02-19 | End: 2023-02-20 | Stop reason: HOSPADM

## 2023-02-19 RX ORDER — METOCLOPRAMIDE HYDROCHLORIDE 5 MG/ML
10 INJECTION INTRAMUSCULAR; INTRAVENOUS ONCE
Status: COMPLETED | OUTPATIENT
Start: 2023-02-19 | End: 2023-02-19

## 2023-02-19 RX ORDER — ACETAMINOPHEN 325 MG/1
650 TABLET ORAL EVERY 6 HOURS PRN
Status: DISCONTINUED | OUTPATIENT
Start: 2023-02-19 | End: 2023-02-20 | Stop reason: HOSPADM

## 2023-02-19 RX ORDER — MAGNESIUM HYDROXIDE/ALUMINUM HYDROXICE/SIMETHICONE 120; 1200; 1200 MG/30ML; MG/30ML; MG/30ML
30 SUSPENSION ORAL ONCE
Status: DISCONTINUED | OUTPATIENT
Start: 2023-02-19 | End: 2023-02-19

## 2023-02-19 RX ORDER — MAGNESIUM HYDROXIDE/ALUMINUM HYDROXICE/SIMETHICONE 120; 1200; 1200 MG/30ML; MG/30ML; MG/30ML
30 SUSPENSION ORAL EVERY 6 HOURS PRN
Status: DISCONTINUED | OUTPATIENT
Start: 2023-02-19 | End: 2023-02-20 | Stop reason: HOSPADM

## 2023-02-19 RX ORDER — DROPERIDOL 2.5 MG/ML
1 INJECTION, SOLUTION INTRAMUSCULAR; INTRAVENOUS ONCE
Status: COMPLETED | OUTPATIENT
Start: 2023-02-19 | End: 2023-02-19

## 2023-02-19 RX ORDER — HALOPERIDOL 5 MG/ML
5 INJECTION INTRAMUSCULAR ONCE
Status: COMPLETED | OUTPATIENT
Start: 2023-02-19 | End: 2023-02-19

## 2023-02-19 RX ORDER — HYDRALAZINE HYDROCHLORIDE 20 MG/ML
10 INJECTION INTRAMUSCULAR; INTRAVENOUS ONCE
Status: COMPLETED | OUTPATIENT
Start: 2023-02-19 | End: 2023-02-19

## 2023-02-19 RX ORDER — MAGNESIUM HYDROXIDE/ALUMINUM HYDROXICE/SIMETHICONE 120; 1200; 1200 MG/30ML; MG/30ML; MG/30ML
30 SUSPENSION ORAL ONCE
Status: DISCONTINUED | OUTPATIENT
Start: 2023-02-19 | End: 2023-02-20 | Stop reason: HOSPADM

## 2023-02-19 RX ORDER — ALBUTEROL SULFATE 90 UG/1
2 AEROSOL, METERED RESPIRATORY (INHALATION) EVERY 4 HOURS PRN
Status: DISCONTINUED | OUTPATIENT
Start: 2023-02-19 | End: 2023-02-20 | Stop reason: HOSPADM

## 2023-02-19 RX ORDER — HYDROMORPHONE HCL/PF 1 MG/ML
1 SYRINGE (ML) INJECTION ONCE
Status: COMPLETED | OUTPATIENT
Start: 2023-02-19 | End: 2023-02-19

## 2023-02-19 RX ORDER — PANTOPRAZOLE SODIUM 40 MG/10ML
40 INJECTION, POWDER, LYOPHILIZED, FOR SOLUTION INTRAVENOUS EVERY 12 HOURS SCHEDULED
Status: DISCONTINUED | OUTPATIENT
Start: 2023-02-19 | End: 2023-02-20 | Stop reason: HOSPADM

## 2023-02-19 RX ORDER — INSULIN LISPRO 100 [IU]/ML
1-5 INJECTION, SOLUTION INTRAVENOUS; SUBCUTANEOUS EVERY 6 HOURS SCHEDULED
Status: DISCONTINUED | OUTPATIENT
Start: 2023-02-19 | End: 2023-02-20 | Stop reason: HOSPADM

## 2023-02-19 RX ADMIN — OXYCODONE HYDROCHLORIDE 20 MG: 10 TABLET ORAL at 20:55

## 2023-02-19 RX ADMIN — HYDROMORPHONE HYDROCHLORIDE 1 MG: 1 INJECTION, SOLUTION INTRAMUSCULAR; INTRAVENOUS; SUBCUTANEOUS at 13:22

## 2023-02-19 RX ADMIN — HALOPERIDOL LACTATE 5 MG: 5 INJECTION, SOLUTION INTRAMUSCULAR at 11:33

## 2023-02-19 RX ADMIN — PANTOPRAZOLE SODIUM 40 MG: 40 INJECTION, POWDER, FOR SOLUTION INTRAVENOUS at 20:55

## 2023-02-19 RX ADMIN — HYDRALAZINE HYDROCHLORIDE 10 MG: 20 INJECTION, SOLUTION INTRAMUSCULAR; INTRAVENOUS at 18:18

## 2023-02-19 RX ADMIN — PANTOPRAZOLE SODIUM 40 MG: 40 INJECTION, POWDER, FOR SOLUTION INTRAVENOUS at 13:34

## 2023-02-19 RX ADMIN — FAMOTIDINE 20 MG: 10 INJECTION INTRAVENOUS at 10:16

## 2023-02-19 RX ADMIN — SODIUM CHLORIDE, SODIUM LACTATE, POTASSIUM CHLORIDE, AND CALCIUM CHLORIDE 150 ML/HR: .6; .31; .03; .02 INJECTION, SOLUTION INTRAVENOUS at 14:37

## 2023-02-19 RX ADMIN — SODIUM CHLORIDE 1000 ML: 0.9 INJECTION, SOLUTION INTRAVENOUS at 10:14

## 2023-02-19 RX ADMIN — OXYCODONE HYDROCHLORIDE 15 MG: 10 TABLET ORAL at 12:35

## 2023-02-19 RX ADMIN — SODIUM CHLORIDE, SODIUM LACTATE, POTASSIUM CHLORIDE, AND CALCIUM CHLORIDE 150 ML/HR: .6; .31; .03; .02 INJECTION, SOLUTION INTRAVENOUS at 22:26

## 2023-02-19 RX ADMIN — METOCLOPRAMIDE 10 MG: 5 INJECTION, SOLUTION INTRAMUSCULAR; INTRAVENOUS at 12:28

## 2023-02-19 RX ADMIN — HYDRALAZINE HYDROCHLORIDE 10 MG: 20 INJECTION, SOLUTION INTRAMUSCULAR; INTRAVENOUS at 12:49

## 2023-02-19 RX ADMIN — OXYCODONE HYDROCHLORIDE 20 MG: 10 TABLET ORAL at 16:33

## 2023-02-19 RX ADMIN — HYDRALAZINE HYDROCHLORIDE 5 MG: 20 INJECTION, SOLUTION INTRAMUSCULAR; INTRAVENOUS at 11:29

## 2023-02-19 RX ADMIN — ONDANSETRON HYDROCHLORIDE 4 MG: 2 SOLUTION INTRAMUSCULAR; INTRAVENOUS at 22:24

## 2023-02-19 NOTE — ASSESSMENT & PLAN NOTE
Lab Results   Component Value Date    EGFR 36 02/19/2023    EGFR 31 02/03/2023    EGFR 28 12/20/2022    CREATININE 1 60 (H) 02/19/2023    CREATININE 1 81 (H) 02/03/2023    CREATININE 1 97 (H) 12/20/2022     Follows with nephrology  Currently at baseline

## 2023-02-19 NOTE — H&P
[address] [facility]  H&P- Lennox Marcial 1970, 46 y o  female MRN: [id number]  Unit/Bed#: ED-25 Encounter: [id number]  Primary Care Provider: Hugh Alonso PA-C   Date and time admitted to hospital: 2/19/2023  9:47 AM    * Vomiting  Assessment & Plan  Presents with intractable nausea and vomiting x1 day history  Denies sick contacts  Patient has had multiple previous episodes of admissions for pancreatitis  Denies alcohol use    Possibly secondary to viral gastroenteritis, gastroparesis, pancreatitis with elevated lipase, peptic ulcer disease, cyclic vomiting syndrome with daily marijuana use  N p o  sips with meds  IV fluids, IV Zofran, IV Protonix  As needed analgesia    Chronic kidney disease, stage 4 (severe) ([facility])  Assessment & Plan  Lab Results   Component Value Date    EGFR 36 02/19/2023    EGFR 31 02/03/2023    EGFR 28 12/20/2022    CREATININE 1 60 (H) 02/19/2023    CREATININE 1 81 (H) 02/03/2023    CREATININE 1 97 (H) 12/20/2022     Follows with nephrology  Currently at baseline    Continuous opioid dependence ([facility] [address] )  Assessment & Plan  Confirmed on PDMP    Bradycardia  Assessment & Plan  Sinus bradycardia with sinus arrhythmia  Rates in the 40s in the emergency department  Monitor overnight on telemetry  Likely vagal response in setting of significant illness/vomiting    Gastroparesis  Assessment & Plan  History of gastroparesis  If no improvement in abdominal pain, nausea vomiting  Consider GI consult    Pancreatitis  Assessment & Plan  Concern for pancreatitis with elevated lipase and epigastric tenderness  Follow-up CT scan without contrast  IV fluids, pain control  N p o , advance diet as tolerated    Bipolar 1 disorder ([facility])  Assessment & Plan  Only medication patient is currently taking his Haldol 5 mg as needed    Benign essential HTN  Assessment & Plan  History of hypertension  Presents with hypertensive urgency likely secondary to severe pain  IV hydralazine PRN, avoid beta-blockers due to bradycardia  Oral medications when patient can tolerate    Asthma  Assessment & Plan  Albuterol as needed    Granulomatosis with polyangiitis [facility])  Assessment & Plan  Follows with rheumatology    Type 2 diabetes mellitus with stage 3 chronic kidney disease, with long-term current use of insulin ([facility] [address] )  Assessment & Plan  Lab Results   Component Value Date    HGBA1C 5 7 02/28/2022       No results for input(s): POCGLU in the last 72 hours  Blood Sugar Average: Last 72 hrs:     Previously was on insulin but hemoglobin A1c has been well controlled  Sliding scale insulin every 6 hours while NPO      VTE Prophylaxis: Low risk, ambulation  Code Status: Level 1 full code    Anticipated Length of Stay:  Patient will be admitted on an Observation basis with an anticipated length of stay of less than 2 midnights  Justification for Hospital Stay: For IV medications    Total Time for Visit, including Counseling / Coordination of Care: 60 minutes  Greater than 50% of this total time spent on direct patient counseling and coordination of care  Chief Complaint:   Abdominal pain    History of Present Illness:    Shaun Bryson is a 46 y o  female With past medical history of CKD 3, microscopic polyangiitis, some dependent type 2 diabetes, hypertension, gastroparesis status post gastric stimulator, asthma hypertension bipolar disorder, schizoaffective disorder presents to the hospital with intractable abdominal pain nausea and vomiting  Patient presented with hypertensive urgency and elevated lipase  She was treated symptomatically in the emergency department  Still unable to tolerate diet  Review of Systems:    Review of Systems   Constitutional: Positive for activity change  Negative for chills and fever  HENT: Negative for sore throat and trouble swallowing  Eyes: Negative for photophobia and visual disturbance  Respiratory: Negative for shortness of breath and wheezing  Cardiovascular: Negative for chest pain and palpitations  Gastrointestinal: Positive for abdominal pain, nausea and vomiting  Negative for constipation and diarrhea  Genitourinary: Negative for difficulty urinating and dysuria  Musculoskeletal: Positive for arthralgias and myalgias  Skin: Negative for rash and wound  Neurological: Negative for dizziness, light-headedness and headaches  Past Medical and Surgical History:     Past Medical History:   Diagnosis Date   • ADHD    • Anemia of chronic disease    • Anxiety    • Arthritis ? • Asthma    • Bipolar disorder ([place] [address] )    • Borderline personality disorder ([place] [address] )    • Cataplexy    • Chronic abdominal pain    • Chronic kidney disease ? • CKD (chronic kidney disease) stage 3, GFR 30-59 ml/min ([facility])    • Cushing syndrome ([facility])    • Depression ?    • Diabetes mellitus ([place] [address] )    • DVT (deep venous thrombosis) ([facility])    • GERD (gastroesophageal reflux disease)    • Headache(784 0) 3 months   • History of acute pancreatitis     felt secondary to Bactrim   • History of transfusion    • Hypertension    • Liver disease     fatty liver   • Microscopic polyangiitis (H[facility])    • Ovarian cyst    • PTSD (post-traumatic stress disorder)    • Self-inflicted injury     self inflicted skin wounds   • Sleep apnea    • Wegener's granulomatosis with renal involvement ([place] [address] ) 2015       Past Surgical History:   Procedure Laterality Date   • ESOPHAGOGASTRODUODENOSCOPY  09/11/2015    mild antral gastritis   • GASTRIC STIMULATOR IMPLANT SURGERY  06/25/2020   • [place] COLONOSCOPY FLX DX W/COLLJ SPEC WHEN PFRMD N/A 12/14/2018    adenoma removed from the transverse, hyperplastic polyp removed from the left colon   • NJ ESOPHAGOGASTRODUODENOSCOPY TRANSORAL DIAGNOSTIC N/A 12/14/2018    gastritis and scant coffee-ground material   Biopsies negative for H  pylori   • NJ OPEN TX RADIAL&ULNAR SHAFT FX W/FIXJ RADIUS&ULNA Left 6/23/2022    Procedure: OPEN REDUCTION W/ INTERNAL FIXATION (ORIF) RADIUS / ULNA (WRIST); Surgeon: Maryjane Ruby MD;  Location: [facility];  Service: Orthopedics   • RELEASE SCAR CONTRACTURE / GRAFT REPAIRS OF HAND Bilateral    • UPPER GASTROINTESTINAL ENDOSCOPY  12/26/2019    Dr Prema Vincent to the pylorus       Meds/Allergies:    Prior to Admission medications    Medication Sig Start Date End Date Taking? Authorizing Provider   acetaminophen (TYLENOL) 500 mg tablet Take 2 tablets (1,000 mg total) by mouth every 8 (eight) hours as needed for mild pain 2/28/22   Katarzyna Thrasher PA-C   al mag oxide-diphenhydramine-lidocaine viscous (MAGIC MOUTHWASH) 1:1:1 suspension Swish and spit 10 mL every 4 (four) hours as needed for mouth pain or discomfort 9/2/22   Katarzyna Thrasher PA-C   albuterol (2 5 mg/3 mL) 0 083 % nebulizer solution USE 1 VIAL VIA NEBULIZER EVERY 6 HOURS AS NEEDED FOR WHEEZING OR SHORTNESS OF BREATH 7/19/22   Katarzyna Thrasher PA-C   albuterol (PROVENTIL HFA,VENTOLIN HFA) 90 mcg/act inhaler INHALE 2 PUFFS BY MOUTH EVERY 6 HOURS AS NEEDED FOR WHEEZING OR SHORTNESS OF BREATH 8/12/22   Katarzyna Thrasher PA-C   Alcohol Swabs (Alcohol Pads) 70 % PADS Use 4 (four) times a day 12/1/21   Katarzyna Thrasher PA-C   amphetamine-dextroamphetamine (ADDERALL XR) 20 MG 24 hr capsule Take 1 capsule (20 mg total) by mouth 2 (two) times a day Max Daily Amount: 40 mg 2/4/23   Katarzyna Thrasher PA-C   Blood Glucose Monitoring Suppl (FreeStyle Lite) DEIRDRE Use daily 9/21/22   Katarzyna Thrasher PA-C   Blood Pressure Monitor KIT Use daily to check blood pressure   9/15/22   Katarzyna Thrasher PA-C   buPROPion (WELLBUTRIN XL) 300 mg 24 hr tablet Take 1 tablet (300 mg total) by mouth every morning 2/11/23   Katarzyna Thrasher PA-C   Calcium 600 1500 (600 Ca) MG TABS  12/20/22   Historical Provider, MD   calcium carbonate (OS-ALISIA) 600 MG tablet Take 1 tablet (600 mg total) by mouth daily 9/2/22   Katarzyna Thrasher PA-C   Cholecalciferol (Vitamin D) 50 MCG (2000 UT) tablet Take 1 tablet (2,000 Units total) by mouth daily 9/2/22   Katarzyna Thrasher PA-C   clobetasol (TEMOVATE) 0 05 % cream APPLY TOPICALLY TO AFFECTED AREA TWICE A DAY 1/24/23   Katarzyna Thrasher PA-C   Comfort Touch Insulin Pen Need 33G X 6 MM MISC USE TO INJECT INSULIN FOUR TIMES A DAY ( WITH MEALS AND AT BEDTIME ) 10/28/22   Katarzyna Thrasher PA-C   Diclofenac Sodium (VOLTAREN) 1 % APPLY 2GM TOPICALLY FOUR TIMES A DAY 12/13/22   Katarzyna Thrasher PA-C   dicyclomine (BENTYL) 20 mg tablet Take 1 tablet (20 mg total) by mouth in the morning and 1 tablet (20 mg total) in the evening  Do all this for 7 days   5/12/22 8/4/22  Jai Arcos MD   Easy Comfort Lancets MISC USE TO TEST THE BLOOD SUGAR THREE TIMES A DAY 9/20/22   Katarzyna Thrasher PA-C   glucose blood (FREESTYLE LITE) test strip USE TO TEST THE BLOOD SUGAR THREE TIMES A DAY AS DIRECTED 9/8/22   Katarzyna Thrasher PA-C   guaiFENesin (ROBITUSSIN) 100 MG/5ML oral liquid Take 10 mL (200 mg total) by mouth 3 (three) times a day as needed for cough or congestion 12/30/22   Katarzyna Thrasher PA-C   haloperidol (HALDOL) 5 mg tablet TAKE ONE TABLET BY MOUTH ONCE DAILY AS NEEDED FOR ABDOMINAL PAIN / NAUSEA 11/10/22   Katarzyna Thrasher PA-C   hydrocortisone 2 5 % cream Apply topically 2 (two) times a day as needed for rash 10/26/22   Katarzyna Thrasher PA-C   insulin glargine (Toujeo Max SoloStar) 300 units/mL CONCENTRATED U-300 injection pen (2-unit dial) Inject 22 Units under the skin daily 10/26/22   Katarzyna Thrasher PA-C   lidocaine (LMX) 4 % cream Apply topically as needed for mild pain 6/28/22   Ej Blackman MD   lidocaine (XYLOCAINE) 5 % ointment APPLY TOPICALLY 2GM TWICE A DAY TO AFFECTED AREAS AS NEEDED FOR MILD PAIN  Patient taking differently: Lidocaine patches 12/10/20   Katarzyna Thrasher PA-C   Linzess 72 MCG CAPS TAKE ONE CAPSULE BY MOUTH ONCE DAILY 2/15/22   Santhosh Wong PA-C   naloxone (Narcan) 4 mg/0 1 mL nasal spray ADMINISTER 1 SPRAY IN ONE NOSTRIL IF NO REPONSE AFTER 2-3 MINUTES SPRAY INTO OTHER NOSTRIL WITH NEW spray 2/9/23   Bonifacio Alejandra PA-C   NON [facility]    Historical Provider, MD   nortriptyline (PAMELOR) 10 mg capsule Take 1 capsule (10 mg total) by mouth daily at bedtime 1/10/23   Mckayla Moncada PA-C   NovoLOG FlexPen 100 units/mL injection pen INJECT 6 UNITS UNDER THE SKIN THREE TIMES A DAY WITH MEALS 10/4/22   Katarzyna Thrasher PA-C   nystatin-triamcinolone (MYCOLOG-II) ointment Apply topically 2 (two) times a day 10/26/22   Katarzyna Thrasher PA-C   ondansetron (Zofran ODT) 8 mg disintegrating tablet Take 1 tablet (8 mg total) by mouth every 8 (eight) hours as needed for nausea or vomiting 10/26/22   Katarzyna Thrasher PA-C   oxyCODONE (Roxicodone) 15 mg immediate release tablet Take 1 tablet (15 mg total) by mouth every 4 (four) hours as needed for moderate pain Max Daily Amount: 90 mg 1/19/23   Katarzyna Thrasher PA-C   pantoprazole (PROTONIX) 40 mg tablet Take 1 tablet (40 mg total) by mouth 2 (two) times a day before meals 1/18/22   Katarzyna Thrasher PA-C   pregabalin (LYRICA) 100 mg capsule Take 1 capsule (100 mg total) by mouth 3 (three) times a day 7/21/22   PRASANTH Pereira   promethazine (PHENERGAN) 25 mg tablet Take 1 tablet (25 mg total) by mouth every 6 (six) hours as needed for nausea or vomiting 5/10/21   Mindi Rajput PA-C   Restasis 0 05 % ophthalmic emulsion  12/29/21   Historical Provider, MD   scopolamine (TRANSDERM-SCOP) 1 5 mg/3 days TD 72 hr patch Place 1 patch on the skin every third day 4/27/21   Dwain Lawrence PA-C   Symbicort 80-4 5 MCG/ACT inhaler INHALE 2 PUFFS BY MOUTH TWICE A DAY RINSE MOUTH AFTER USE 11/2/22   Katarzyna Thrasher PA-C   TiZANidine (ZANAFLEX) 4 MG capsule TAKE ONE CAPSULE BY MOUTH THREE TIMES A DAY 12/20/22   Katarzyna Thrasher PA-C       Allergies:    Allergies   Allergen Reactions   • Prozac [Fluoxetine Hcl]      SI   • Bactrim [Sulfamethoxazole-Trimethoprim]      Pt "They think that is what cause the pancreatitis"    • Flagyl [Metronidazole] Diarrhea and Abdominal Pain   • Lamictal [Lamotrigine] GI Intolerance   • Lithium Other (See Comments)   • Ibuprofen    • Lexapro [Escitalopram Oxalate] Rash   • Navane [Thiothixene]      SI   • Other      "novaine?" antipsychotic       Social History:     Marital Status: Single   Substance Use History:   Social History     Substance and Sexual Activity   Alcohol Use Never     Social History     Tobacco Use   Smoking Status Former   • Packs/day: 1 00   • Years: 10 00   • Pack years: 10 00   • Types: Cigarettes   • Quit date: 2011   • Years since quittin 1   Smokeless Tobacco Never   Tobacco Comments    Stopped smoking 11 years ago     Social History     Substance and Sexual Activity   Drug Use Yes   • Types: Marijuana    Comment: marijuana daily       Family History:    Pertinent family history reviewed    Physical Exam:     Vitals:   Blood Pressure: (!) 200/107 (23 1317)  Pulse: (!) 53 (23 1317)  Temperature: 98 °F (36 7 °C) (23 0951)  Temp Source: Oral (23 0951)  Respirations: 16 (23 1248)  Height: 5' 2" (157 5 cm) (23 1238)  Weight - Scale: 69 kg (152 lb 1 9 oz) (23 1238)  SpO2: 99 % (23 1212)    Physical Exam  Vitals reviewed  Constitutional:       General: She is not in acute distress  Appearance: She is well-developed  She is ill-appearing and diaphoretic  She is not toxic-appearing  HENT:      Head: Normocephalic and atraumatic  Mouth/Throat:      Mouth: Mucous membranes are moist    Eyes:      General: No scleral icterus  Extraocular Movements: Extraocular movements intact  Cardiovascular:      Rate and Rhythm: Regular rhythm  Bradycardia present  Heart sounds: Normal heart sounds  Pulmonary:      Effort: Pulmonary effort is normal  No respiratory distress  Breath sounds: No wheezing        Comments: Decreased breath sounds bilaterally  Abdominal:      Palpations: Abdomen is soft  Comments: Gastric tenderness, voluntary guarding   Musculoskeletal:         General: No swelling, tenderness or deformity  Skin:     General: Skin is warm  Neurological:      General: No focal deficit present  Mental Status: She is alert  Mental status is at baseline  Additional Data:     Lab Results: I have reviewed pertinent results     Results from last 7 days   Lab Units 02/19/23  1018   WBC Thousand/uL 6 87   HEMOGLOBIN g/dL 13 8   HEMATOCRIT % 41 3   PLATELETS Thousands/uL 222   NEUTROS PCT % 77*   LYMPHS PCT % 17   MONOS PCT % 6   EOS PCT % 0     Results from last 7 days   Lab Units 02/19/23  1018   SODIUM mmol/L 140   POTASSIUM mmol/L 3 8   CHLORIDE mmol/L 110*   CO2 mmol/L 23   BUN mg/dL 30*   CREATININE mg/dL 1 60*   ANION GAP mmol/L 7   CALCIUM mg/dL 9 6   ALBUMIN g/dL 4 2   TOTAL BILIRUBIN mg/dL 0 42   ALK PHOS U/L 104   ALT U/L 12   AST U/L 18   GLUCOSE RANDOM mg/dL 132                       Imaging: I have reviewed pertinent imaging     CT abdomen wo contrast    (Results Pending)       EKG, Pathology, and Other Studies Reviewed on Admission:   · EKG: Reviewed     Allscripts / Epic Records Reviewed    ** Please Note: This note has been constructed using a voice recognition system   **

## 2023-02-19 NOTE — ASSESSMENT & PLAN NOTE
Concern for pancreatitis with elevated lipase and epigastric tenderness  Follow-up CT scan without contrast  IV fluids, pain control  N p o , advance diet as tolerated

## 2023-02-19 NOTE — ED PROVIDER NOTES
History  Chief Complaint   Patient presents with   • Vomiting     Vomiting since this am, hx chronic pancreatitis, received 1 25 droperidol from EMS     47 yo F h/o IDDM, CKD, asthma, gastroparesis s/p gastric stimulator, chronic pain, bipolar, schizophrenia, GERD; presenting for evaluation of N/V, epigastric pain  Pt states sx started yesterday with nausea/diarrhea and then today with vomiting, epigastric pain radiating to LUQ/L flank  C/o generalized weakness  States this feels like prior episodes of pancreatitis  EMS gave pt 1 25 mg Droperidol and pt reports feeling better, nausea resolved  Prehospital glucose 120s  MDM: 47 yo F with N/V/D/epigastric pain- symptom management, IVF for hydration, CBC to r/o leukocytosis/anemia, CMP to assess for elevated LFTs/MARY/electrolyte abnormalities, lipase to r/o pancreatitis, EKG to r/o arrhythmia/ischemic changes, troponin to eval for ischemia, dispo pending results               Per independent review of external records:  - 2/8/23 PCP note- IDDM, CKD, asthma, gastroparesis s/p gastric stimulator, chronic pain, neuropathy, bipolar d/o/schizo-affective schizophrenia, GERD    Admission December 2022- N/V, elevated lipase but not felt to be pancreatitis at that time    12/15/22 RUQ US:  No gallbladder findings  No intrahepatic biliary dilatation  CBD measures 3 0 mm  No choledocholithiasis  Echogenic right kidney, which can be seen in setting of renal parenchymal disease  Prior to Admission Medications   Prescriptions Last Dose Informant Patient Reported? Taking? Alcohol Swabs (Alcohol Pads) 70 % PADS   No Yes   Sig: Use 4 (four) times a day   Blood Glucose Monitoring Suppl (FreeStyle Lite) DEIRDRE   No Yes   Sig: Use daily   Blood Pressure Monitor KIT   No Yes   Sig: Use daily to check blood pressure     Calcium 600 1500 (600 Ca) MG TABS Not Taking  Yes No   Patient not taking: Reported on 2/19/2023   Cholecalciferol (Vitamin D) 50 MCG (2000 UT) tablet Not Taking  No No   Sig: Take 1 tablet (2,000 Units total) by mouth daily   Patient not taking: Reported on 2/19/2023   Comfort Touch Insulin Pen Need 33G X 6 MM MISC   No Yes   Sig: USE TO INJECT INSULIN FOUR TIMES A DAY ( WITH MEALS AND AT BEDTIME )   Diclofenac Sodium (VOLTAREN) 1 % Not Taking  No No   Sig: APPLY 2GM TOPICALLY FOUR TIMES A DAY   Patient not taking: Reported on 2/19/2023   Easy Comfort Lancets MISC   No Yes   Sig: USE TO TEST THE BLOOD SUGAR THREE TIMES A DAY   Linzess 72 MCG CAPS 2/18/2023  No Yes   Sig: TAKE ONE CAPSULE BY MOUTH ONCE DAILY   NON FORMULARY   Yes No   Sig: Medical [facility]   NovoLOG FlexPen 100 units/mL injection pen Not Taking  No No   Sig: INJECT 6 UNITS UNDER THE SKIN THREE TIMES A DAY WITH MEALS   Patient not taking: Reported on 2/19/2023   Restasis 0 05 % ophthalmic emulsion Not Taking  Yes No   Patient not taking: Reported on 2/19/2023   Symbicort 80-4 5 MCG/ACT inhaler Not Taking  No No   Sig: INHALE 2 PUFFS BY MOUTH TWICE A DAY RINSE MOUTH AFTER USE   Patient not taking: Reported on 2/19/2023   TiZANidine (ZANAFLEX) 4 MG capsule Not Taking  No No   Sig: TAKE ONE CAPSULE BY MOUTH THREE TIMES A DAY   Patient not taking: Reported on 2/19/2023   acetaminophen (TYLENOL) 500 mg tablet Not Taking  No No   Sig: Take 2 tablets (1,000 mg total) by mouth every 8 (eight) hours as needed for mild pain   Patient not taking: Reported on 2/19/2023   al mag oxide-diphenhydramine-lidocaine viscous (MAGIC MOUTHWASH) 1:1:1 suspension Not Taking  No No   Sig: Swish and spit 10 mL every 4 (four) hours as needed for mouth pain or discomfort   Patient not taking: Reported on 2/19/2023   albuterol (2 5 mg/3 mL) 0 083 % nebulizer solution Not Taking  No No   Sig: USE 1 VIAL VIA NEBULIZER EVERY 6 HOURS AS NEEDED FOR WHEEZING OR SHORTNESS OF BREATH   Patient not taking: Reported on 2/19/2023   albuterol (PROVENTIL HFA,VENTOLIN HFA) 90 mcg/act inhaler Not Taking  No No   Sig: INHALE 2 PUFFS BY MOUTH EVERY 6 HOURS AS NEEDED FOR WHEEZING OR SHORTNESS OF BREATH   Patient not taking: Reported on 2/19/2023   amphetamine-dextroamphetamine (ADDERALL XR) 20 MG 24 hr capsule Past Week  No Yes   Sig: Take 1 capsule (20 mg total) by mouth 2 (two) times a day Max Daily Amount: 40 mg   buPROPion (WELLBUTRIN XL) 300 mg 24 hr tablet Not Taking  No No   Sig: Take 1 tablet (300 mg total) by mouth every morning   Patient not taking: Reported on 2/19/2023   calcium carbonate (OS-ALISIA) 600 MG tablet Not Taking  No No   Sig: Take 1 tablet (600 mg total) by mouth daily   Patient not taking: Reported on 2/19/2023   clobetasol (TEMOVATE) 0 05 % cream Not Taking  No No   Sig: APPLY TOPICALLY TO AFFECTED AREA TWICE A DAY   Patient not taking: Reported on 2/19/2023   dicyclomine (BENTYL) 20 mg tablet   No Yes   Sig: Take 1 tablet (20 mg total) by mouth in the morning and 1 tablet (20 mg total) in the evening  Do all this for 7 days     glucose blood (FREESTYLE LITE) test strip   No Yes   Sig: USE TO TEST THE BLOOD SUGAR THREE TIMES A DAY AS DIRECTED   guaiFENesin (ROBITUSSIN) 100 MG/5ML oral liquid Not Taking  No No   Sig: Take 10 mL (200 mg total) by mouth 3 (three) times a day as needed for cough or congestion   Patient not taking: Reported on 2/19/2023   haloperidol (HALDOL) 5 mg tablet Past Month  No Yes   Sig: TAKE ONE TABLET BY MOUTH ONCE DAILY AS NEEDED FOR ABDOMINAL PAIN / NAUSEA   hydrocortisone 2 5 % cream Not Taking  No No   Sig: Apply topically 2 (two) times a day as needed for rash   Patient not taking: Reported on 2/19/2023   insulin glargine (Toujeo Max SoloStar) 300 units/mL CONCENTRATED U-300 injection pen (2-unit dial) Not Taking  No No   Sig: Inject 22 Units under the skin daily   Patient not taking: Reported on 2/19/2023   lidocaine (LMX) 4 % cream   No Yes   Sig: Apply topically as needed for mild pain   lidocaine (XYLOCAINE) 5 % ointment   No No   Sig: APPLY TOPICALLY 2GM TWICE A DAY TO AFFECTED AREAS AS NEEDED FOR MILD PAIN   Patient taking differently: Lidocaine patches   naloxone (Narcan) 4 mg/0 1 mL nasal spray   No Yes   Sig: ADMINISTER 1 SPRAY IN ONE NOSTRIL IF NO REPONSE AFTER 2-3 MINUTES SPRAY INTO OTHER NOSTRIL WITH NEW spray   nortriptyline (PAMELOR) 10 mg capsule Not Taking  No No   Sig: Take 1 capsule (10 mg total) by mouth daily at bedtime   Patient not taking: Reported on 2/19/2023   nystatin-triamcinolone (MYCOLOG-II) ointment Not Taking  No No   Sig: Apply topically 2 (two) times a day   Patient not taking: Reported on 2/19/2023   ondansetron (Zofran ODT) 8 mg disintegrating tablet Past Week  No Yes   Sig: Take 1 tablet (8 mg total) by mouth every 8 (eight) hours as needed for nausea or vomiting   oxyCODONE (Roxicodone) 15 mg immediate release tablet   No Yes   Sig: Take 1 tablet (15 mg total) by mouth every 4 (four) hours as needed for moderate pain Max Daily Amount: 90 mg   pantoprazole (PROTONIX) 40 mg tablet Not Taking  No No   Sig: Take 1 tablet (40 mg total) by mouth 2 (two) times a day before meals   Patient not taking: Reported on 2/19/2023   pregabalin (LYRICA) 100 mg capsule Not Taking  No No   Sig: Take 1 capsule (100 mg total) by mouth 3 (three) times a day   Patient not taking: Reported on 2/19/2023   promethazine (PHENERGAN) 25 mg tablet Not Taking  No No   Sig: Take 1 tablet (25 mg total) by mouth every 6 (six) hours as needed for nausea or vomiting   Patient not taking: Reported on 2/19/2023   scopolamine (TRANSDERM-SCOP) 1 5 mg/3 days TD 72 hr patch Not Taking  No No   Sig: Place 1 patch on the skin every third day   Patient not taking: Reported on 2/19/2023      Facility-Administered Medications: None       Past Medical History:   Diagnosis Date   • ADHD    • Anemia of chronic disease    • Anxiety    • Arthritis ? • Asthma    • Bipolar disorder ([facility] )    • Borderline personality disorder ([facility] )    • Cataplexy    • Chronic abdominal pain    • Chronic kidney disease ?    • CKD (chronic kidney disease) stage 3, GFR 30-59 ml/min (HCC)    • Cushing syndrome (HCC)    • Depression ? • Diabetes mellitus ([facility] )    • DVT (deep venous thrombosis) (HCC)    • GERD (gastroesophageal reflux disease)    • Headache(784 0) 3 months   • History of acute pancreatitis     felt secondary to Bactrim   • History of transfusion    • Hypertension    • Liver disease     fatty liver   • Microscopic polyangiitis (HCC)    • Ovarian cyst    • PTSD (post-traumatic stress disorder)    • Self-inflicted injury     self inflicted skin wounds   • Sleep apnea    • Wegener's granulomatosis with renal involvement ([facility] ) 2015       Past Surgical History:   Procedure Laterality Date   • ESOPHAGOGASTRODUODENOSCOPY  09/11/2015    mild antral gastritis   • GASTRIC STIMULATOR IMPLANT SURGERY  06/25/2020   • KS COLONOSCOPY FLX DX W/COLLJ SPEC WHEN PFRMD N/A 12/14/2018    adenoma removed from the transverse, hyperplastic polyp removed from the left colon   • KS ESOPHAGOGASTRODUODENOSCOPY TRANSORAL DIAGNOSTIC N/A 12/14/2018    gastritis and scant coffee-ground material   Biopsies negative for H  pylori   • KS OPEN TX RADIAL&ULNAR SHAFT FX W/FIXJ RADIUS&ULNA Left 6/23/2022    Procedure: OPEN REDUCTION W/ INTERNAL FIXATION (ORIF) RADIUS / ULNA (WRIST); Surgeon: Carolina Marion MD;  Location: [facility];  Service: Orthopedics   • RELEASE SCAR CONTRACTURE / GRAFT REPAIRS OF HAND Bilateral    • UPPER GASTROINTESTINAL ENDOSCOPY  12/26/2019    Dr Judit Arguetaox to the pylorus       Family History   Problem Relation Age of Onset   • Arthritis Mother    • Depression Mother    • Diabetes Mother    • Mental illness Mother    • Migraines Mother    • No Known Problems Father    • Colon cancer Neg Hx    • Drug abuse Neg Hx         mother father   • Mental illness Neg Hx         disorder, mother father   • Cancer Neg Hx    • Breast cancer Neg Hx      I have reviewed and agree with the history as documented      E-Cigarette/Vaping   • E-Cigarette Use Never User E-Cigarette/Vaping Substances   • Nicotine No    • THC No    • CBD No    • Flavoring No    • Other No    • Unknown No      Social History     Tobacco Use   • Smoking status: Former     Packs/day:  00     Years: 10 00     Pack years: 10 00     Types: Cigarettes     Quit date: 2011     Years since quittin 1   • Smokeless tobacco: Never   • Tobacco comments:     Stopped smoking 11 years ago   Vaping Use   • Vaping Use: Never used   Substance Use Topics   • Alcohol use: Never   • Drug use: Yes     Types: Marijuana     Comment: marijuana daily       Review of Systems    Physical Exam  Physical Exam  Vitals and nursing note reviewed  Constitutional:       General: She is not in acute distress  Appearance: She is well-developed  HENT:      Head: Normocephalic and atraumatic  Nose: Nose normal    Eyes:      Conjunctiva/sclera: Conjunctivae normal    Cardiovascular:      Rate and Rhythm: Regular rhythm  Bradycardia present  Heart sounds: Normal heart sounds  Pulmonary:      Effort: Pulmonary effort is normal  No respiratory distress  Breath sounds: Normal breath sounds  No stridor  No wheezing or rales  Chest:      Chest wall: No tenderness  Abdominal:      General: There is no distension  Palpations: Abdomen is soft  Tenderness: There is abdominal tenderness  There is no guarding or rebound  Comments: Tender to palpation epigastric/LUQ  No rebound/guarding, neg adams's sign  Back: no skin changes/zoster rash, no CVA ttp   Musculoskeletal:         General: No tenderness or deformity  Cervical back: Normal range of motion and neck supple  Skin:     General: Skin is warm and dry  Findings: No rash  Neurological:      General: No focal deficit present  Mental Status: She is alert and oriented to person, place, and time  Motor: No abnormal muscle tone  Coordination: Coordination normal    Psychiatric:         Thought Content:  Thought content normal          Judgment: Judgment normal          Vital Signs  ED Triage Vitals   Temperature Pulse Respirations Blood Pressure SpO2   02/19/23 0951 02/19/23 0951 02/19/23 0951 02/19/23 0951 02/19/23 0951   98 °F (36 7 °C) 81 20 (!) 211/119 99 %      Temp Source Heart Rate Source Patient Position - Orthostatic VS BP Location FiO2 (%)   02/19/23 0951 02/19/23 1438 02/19/23 1122 02/19/23 1122 --   Oral Monitor Lying Right arm       Pain Score       02/19/23 1150       10 - Worst Possible Pain           Vitals:    02/19/23 1248 02/19/23 1317 02/19/23 1337 02/19/23 1438   BP: (!) 232/107 (!) 200/107 (!) 177/92 143/80   Pulse: (!) 48 (!) 53 (!) 49 (!) 48   Patient Position - Orthostatic VS:    Lying         Visual Acuity      ED Medications  Medications   aluminum-magnesium hydroxide-simethicone (MYLANTA) oral suspension 30 mL (30 mL Oral Not Given 2/19/23 1437)   hydrALAZINE (APRESOLINE) injection 10 mg (has no administration in time range)   lactated ringers infusion (150 mL/hr Intravenous New Bag 2/19/23 1437)   albuterol (PROVENTIL HFA,VENTOLIN HFA) inhaler 2 puff (has no administration in time range)   linaCLOtide CAPS 1 capsule (has no administration in time range)   acetaminophen (TYLENOL) tablet 650 mg (has no administration in time range)   aluminum-magnesium hydroxide-simethicone (MYLANTA) oral suspension 30 mL (has no administration in time range)   insulin lispro (HumaLOG) 100 units/mL subcutaneous injection 1-5 Units (has no administration in time range)   oxyCODONE (ROXICODONE) IR tablet 15 mg (has no administration in time range)     Or   oxyCODONE (ROXICODONE) immediate release tablet 20 mg (has no administration in time range)   HYDROmorphone (DILAUDID) injection 1 mg (has no administration in time range)   ondansetron (ZOFRAN) injection 4 mg (has no administration in time range)   pantoprazole (PROTONIX) injection 40 mg (40 mg Intravenous Given 2/19/23 1114)   droperidol (FOR EMS ONLY) (INAPSINE) 2 5 mg/mL injection 2 5 mg (0 mg Does not apply Given to EMS 2/19/23 0954)   sodium chloride 0 9 % bolus 1,000 mL (0 mL Intravenous Stopped 2/19/23 1228)   Famotidine (PF) (PEPCID) injection 20 mg (20 mg Intravenous Given 2/19/23 1016)   hydrALAZINE (APRESOLINE) injection 5 mg (5 mg Intravenous Given 2/19/23 1129)   haloperidol lactate (HALDOL) injection 5 mg (5 mg Intramuscular Given 2/19/23 1133)   oxyCODONE (ROXICODONE) IR tablet 15 mg (15 mg Oral Given 2/19/23 1235)   metoclopramide (REGLAN) injection 10 mg (10 mg Intravenous Given 2/19/23 1228)   hydrALAZINE (APRESOLINE) injection 10 mg (10 mg Intravenous Given 2/19/23 1249)   HYDROmorphone (DILAUDID) injection 1 mg (1 mg Intravenous Given 2/19/23 1322)       Diagnostic Studies  Results Reviewed     Procedure Component Value Units Date/Time    Urine Microscopic [[id number]]  (Abnormal) Collected: 02/19/23 1114    Lab Status: Final result Specimen: Urine, Other Updated: 02/19/23 1226     RBC, UA 1-2 /hpf      WBC, UA None Seen /hpf      Epithelial Cells Occasional /hpf      Bacteria, UA Occasional /hpf     Urine Macroscopic, POC [[id number]]  (Abnormal) Collected: 02/19/23 1114    Lab Status: Final result Specimen: Urine Updated: 02/19/23 1116     Color, UA Yellow     Clarity, UA Clear     pH, UA 6 0     Leukocytes, UA Negative     Nitrite, UA Negative     Protein, UA >=300 mg/dl      Glucose, UA Negative mg/dl      Ketones, UA Negative mg/dl      Urobilinogen, UA 0 2 E U /dl      Bilirubin, UA Negative     Occult Blood, UA Small     Specific [place], UA 1 025    Narrative:      CLINITEK RESULT    HS Troponin 0hr (reflex protocol) [[id number]]  (Normal) Collected: 02/19/23 1018    Lab Status: Final result Specimen: Blood from Arm, Right Updated: 02/19/23 1049     hs TnI 0hr <2 ng/L     Comprehensive metabolic panel [[id number]]  (Abnormal) Collected: 02/19/23 1018    Lab Status: Final result Specimen: Blood from Arm, Right Updated: 02/19/23 1042     Sodium 140 mmol/L Potassium 3 8 mmol/L      Chloride 110 mmol/L      CO2 23 mmol/L      ANION GAP 7 mmol/L      BUN 30 mg/dL      Creatinine 1 60 mg/dL      Glucose 132 mg/dL      Calcium 9 6 mg/dL      AST 18 U/L      ALT 12 U/L      Alkaline Phosphatase 104 U/L      Total Protein 7 8 g/dL      Albumin 4 2 g/dL      Total Bilirubin 0 42 mg/dL      eGFR 36 ml/min/1 73sq m     Narrative:      National Kidney Disease Foundation guidelines for Chronic Kidney Disease (CKD):   •  Stage 1 with normal or high GFR (GFR > 90 mL/min/1 73 square meters)  •  Stage 2 Mild CKD (GFR = 60-89 mL/min/1 73 square meters)  •  Stage 3A Moderate CKD (GFR = 45-59 mL/min/1 73 square meters)  •  Stage 3B Moderate CKD (GFR = 30-44 mL/min/1 73 square meters)  •  Stage 4 Severe CKD (GFR = 15-29 mL/min/1 73 square meters)  •  Stage 5 End Stage CKD (GFR <15 mL/min/1 73 square meters)  Note: GFR calculation is accurate only with a steady state creatinine    Lipase [[id number]]  (Abnormal) Collected: 02/19/23 1018    Lab Status: Final result Specimen: Blood from Arm, Right Updated: 02/19/23 1042     Lipase 396 u/L     CBC and differential [[id number]]  (Abnormal) Collected: 02/19/23 1018    Lab Status: Final result Specimen: Blood from Arm, Right Updated: 02/19/23 1024     WBC 6 87 Thousand/uL      RBC 4 41 Million/uL      Hemoglobin 13 8 g/dL      Hematocrit 41 3 %      MCV 94 fL      MCH 31 3 pg      MCHC 33 4 g/dL      RDW 13 1 %      MPV 10 0 fL      Platelets 842 Thousands/uL      nRBC 0 /100 WBCs      Neutrophils Relative 77 %      Immat GRANS % 0 %      Lymphocytes Relative 17 %      Monocytes Relative 6 %      Eosinophils Relative 0 %      Basophils Relative 0 %      Neutrophils Absolute 5 20 Thousands/µL      Immature Grans Absolute 0 02 Thousand/uL      Lymphocytes Absolute 1 16 Thousands/µL      Monocytes Absolute 0 44 Thousand/µL      Eosinophils Absolute 0 03 Thousand/µL      Basophils Absolute 0 02 Thousands/µL                  CT abdomen wo contrast   Final Result by Jasiel Soria MD (02/19 1438)      No acute CT findings  Workstation performed: [id number]                    Procedures  Procedures         ED Course  ED Course as of 02/19/23 1459   Sun Feb 19, 2023   0955 Blood Pressure(!): 211/119   1005 EKG independently interpreted by myself:  sinus arrhythmia, 52 bpm, LAD, normal intervals, qtc 412, no sig st changes, twi III, tw flattening avF, v6, no sig change compared to prior   1048 Creatinine(!): 1 60  Lower than prior, 1 8 2 weeks ago and up to 2 2 months ago   1049 Lipase(!): 396  Always elevated, lower than prior   1059 hs TnI 0hr: <2   1100 Pt reassessed, continues to feel better, declined po challenge   1129 Pt retching again, will give Haldol   1220 Continued sx  [place] chron[place] on 15 mg Oxy, will give her home dose and additional meds               HEART Risk Score    Flowsheet Row Most Recent Value   Heart Score Risk Calculator    History 0 Filed at: 02/19/2023 1059   ECG 0 Filed at: 02/19/2023 1059   Age 1 Filed at: 02/19/2023 1059   Risk Factors 1 Filed at: 02/19/2023 1059   Troponin 0 Filed at: 02/19/2023 1059   HEART Score 2 Filed at: 02/19/2023 1059                        SBIRT 20yo+    Flowsheet Row Most Recent Value   SBIRT (25 yo +)    In order to provide better care to our patients, we are screening all of our patients for alcohol and drug use  Would it be okay to ask you these screening questions?  No Filed at: 02/19/2023 1020                    Medical Decision Making  47 yo F with N/V/epigastric/LUQ pain- recurrent issue, may be multifactorial- gastroparesis/PUD/pancreatitis  Lipase elevated 3x normal, possible pancreatitis  Remainder of labs/EKG unrevealing  Sx waxing/waning in severity in ED, but ultimately stated unable to tolerate po and intractable pain/nausea, therefore admitted for further management    CKD (chronic kidney disease): chronic illness or injury  Elevated blood pressure reading: acute illness or injury  Epigastric pain: acute illness or injury  Intractable nausea and vomiting: acute illness or injury  Pancreatitis: acute illness or injury  Amount and/or Complexity of Data Reviewed  External Data Reviewed: labs, radiology, ECG and notes  Labs: ordered  Decision-making details documented in ED Course  ECG/medicine tests: ordered and independent interpretation performed  Decision-making details documented in ED Course  Risk  OTC drugs  Prescription drug management  Parenteral controlled substances  Drug therapy requiring intensive monitoring for toxicity  Decision regarding hospitalization  Disposition  Final diagnoses:   Epigastric pain   Elevated blood pressure reading   CKD (chronic kidney disease)   Pancreatitis   Intractable nausea and vomiting     Time reflects when diagnosis was documented in both MDM as applicable and the Disposition within this note     Time User Action Codes Description Comment    2/19/2023 11:06 AM Laurann Ates A Add [R11 2] Nausea and vomiting     2/19/2023 11:06 AM Laurann Ates A Add [R10 13] Epigastric pain     2/19/2023 11:16 AM Hay, Ness Pipes A Add [R03 0] Elevated blood pressure reading     2/19/2023 11:18 AM Laurann Ates A Add [N18 9] CKD (chronic kidney disease)     2/19/2023 11:18 AM Laurann Ates A Add [R74 8] Elevated lipase     2/19/2023 12:23 PM Laurann Ates A Remove [R74 8] Elevated lipase     2/19/2023 12:23 PM Denton Hicks Add [K85 90] Pancreatitis     2/19/2023 12:29 PM Laurann Ates A Add [R11 2] Intractable nausea and vomiting     2/19/2023 12:29 PM Denton Hicks Modify [R10 13] Epigastric pain     2/19/2023 12:29 PM Laurann Ates A Remove [R11 2] Nausea and vomiting       ED Disposition     ED Disposition   Admit    Condition   Stable    Date/Time   Sun Feb 19, 2023 12:29 PM    Comment   Case was discussed with HAJA and the patient's admission status was agreed to be Admission Status: observation status to the service of Dr Pretty So   Follow-up Information     Follow up With Specialties Details Why [address] [facility]  [address] [facility]  [place]   [id number]  [id number]  [phone]            Current Discharge Medication List      CONTINUE these medications which have NOT CHANGED    Details   Alcohol Swabs (Alcohol Pads) 70 % PADS Use 4 (four) times a day  Qty: 400 each, Refills: 2    Associated Diagnoses: Type 2 diabetes mellitus with stage 4 chronic kidney disease, unspecified whether long term insulin use ([facility])      amphetamine-dextroamphetamine (ADDERALL XR) 20 MG 24 hr capsule Take 1 capsule (20 mg total) by mouth 2 (two) times a day Max Daily Amount: 40 mg  Qty: 60 capsule, Refills: 0    Comments: Continuation of therapy  Associated Diagnoses: Bipolar 1 disorder ([facility])      Blood Glucose Monitoring Suppl (FreeStyle Lite) DEIRDRE Use daily  Qty: 1 each, Refills: 0    Associated Diagnoses: Type 2 diabetes mellitus with stage 4 chronic kidney disease, unspecified whether long term insulin use ([facility])      Blood Pressure Monitor KIT Use daily to check blood pressure  Qty: 1 kit, Refills: 0    Associated Diagnoses: Benign essential HTN      Comfort Touch Insulin Pen Need 33G X 6 MM MISC USE TO INJECT INSULIN FOUR TIMES A DAY ( WITH MEALS AND AT BEDTIME )  Qty: 100 each, Refills: 10    Associated Diagnoses: Type 2 diabetes mellitus with stage 3 chronic kidney disease, with long-term current use of insulin, unspecified whether stage 3a or 3b CKD ([facility])      dicyclomine (BENTYL) 20 mg tablet Take 1 tablet (20 mg total) by mouth in the morning and 1 tablet (20 mg total) in the evening  Do all this for 7 days    Qty: 20 tablet, Refills: 0    Associated Diagnoses: Acute abdominal pain      Easy Comfort Lancets MISC USE TO TEST THE BLOOD SUGAR THREE TIMES A DAY  Qty: 100 each, Refills: 6    Associated Diagnoses: Type 2 diabetes mellitus with stage 4 chronic kidney disease, unspecified whether long term insulin use ([facility] )      glucose blood (FREESTYLE LITE) test strip USE TO TEST THE BLOOD SUGAR THREE TIMES A DAY AS DIRECTED  Qty: 100 strip, Refills: 10    Associated Diagnoses: Type 2 diabetes mellitus without complication, with long-term current use of insulin ([facility])      haloperidol (HALDOL) 5 mg tablet TAKE ONE TABLET BY MOUTH ONCE DAILY AS NEEDED FOR ABDOMINAL PAIN / NAUSEA  Qty: 10 tablet, Refills: 0    Associated Diagnoses: Nausea; Generalized abdominal pain      lidocaine (LMX) 4 % cream Apply topically as needed for mild pain  Qty: 30 g, Refills: 0    Associated Diagnoses: Back pain      Linzess 72 MCG CAPS TAKE ONE CAPSULE BY MOUTH ONCE DAILY  Qty: 90 capsule, Refills: 10    Associated Diagnoses: Constipation, unspecified constipation type      naloxone (Narcan) 4 mg/0 1 mL nasal spray ADMINISTER 1 SPRAY IN ONE NOSTRIL IF NO REPONSE AFTER 2-3 MINUTES SPRAY INTO OTHER NOSTRIL WITH NEW spray  Qty: 2 each, Refills: 2    Associated Diagnoses: Chronic pain syndrome      ondansetron (Zofran ODT) 8 mg disintegrating tablet Take 1 tablet (8 mg total) by mouth every 8 (eight) hours as needed for nausea or vomiting  Qty: 30 tablet, Refills: 2    Associated Diagnoses: Nausea      oxyCODONE (Roxicodone) 15 mg immediate release tablet Take 1 tablet (15 mg total) by mouth every 4 (four) hours as needed for moderate pain Max Daily Amount: 90 mg  Qty: 180 tablet, Refills: 0    Comments: Please d/c 10 mg tablet  Associated Diagnoses: Granulomatosis with polyangiitis with renal involvement ([facility] );  Chronic pain syndrome      acetaminophen (TYLENOL) 500 mg tablet Take 2 tablets (1,000 mg total) by mouth every 8 (eight) hours as needed for mild pain  Qty: 60 tablet, Refills: 1    Associated Diagnoses: Acute non intractable tension-type headache      al mag oxide-diphenhydramine-lidocaine viscous (MAGIC MOUTHWASH) 1:1:1 suspension Swish and spit 10 mL every 4 (four) hours as needed for mouth pain or discomfort  Qty: 90 mL, Refills: 2 Associated Diagnoses: Mouth sores      albuterol (2 5 mg/3 mL) 0 083 % nebulizer solution USE 1 VIAL VIA NEBULIZER EVERY 6 HOURS AS NEEDED FOR WHEEZING OR SHORTNESS OF BREATH  Qty: 90 mL, Refills: 0    Associated Diagnoses: Mild persistent asthma without complication      albuterol (PROVENTIL HFA,VENTOLIN HFA) 90 mcg/act inhaler INHALE 2 PUFFS BY MOUTH EVERY 6 HOURS AS NEEDED FOR WHEEZING OR SHORTNESS OF BREATH  Qty: 18 g, Refills: 1    Associated Diagnoses: Mild persistent asthma without complication      buPROPion (WELLBUTRIN XL) 300 mg 24 hr tablet Take 1 tablet (300 mg total) by mouth every morning  Qty: 90 tablet, Refills: 1    Associated Diagnoses: Depression, unspecified depression type      !! Calcium 600 1500 (600 Ca) MG TABS       !! calcium carbonate (OS-ALISIA) 600 MG tablet Take 1 tablet (600 mg total) by mouth daily  Qty: 90 tablet, Refills: 1    Associated Diagnoses: Osteopenia, unspecified location      Cholecalciferol (Vitamin D) 50 MCG (2000 UT) tablet Take 1 tablet (2,000 Units total) by mouth daily  Qty: 90 tablet, Refills: 1    Associated Diagnoses: Osteopenia, unspecified location      clobetasol (TEMOVATE) 0 05 % cream APPLY TOPICALLY TO AFFECTED AREA TWICE A DAY  Qty: 60 g, Refills: 0    Associated Diagnoses: Rash      Diclofenac Sodium (VOLTAREN) 1 % APPLY 2GM TOPICALLY FOUR TIMES A DAY  Qty: 200 g, Refills: 2    Associated Diagnoses: Pain in finger of left hand      guaiFENesin (ROBITUSSIN) 100 MG/5ML oral liquid Take 10 mL (200 mg total) by mouth 3 (three) times a day as needed for cough or congestion  Qty: 473 mL, Refills: 0    Associated Diagnoses: Acute bronchitis, unspecified organism      hydrocortisone 2 5 % cream Apply topically 2 (two) times a day as needed for rash  Qty: 30 g, Refills: 1    Associated Diagnoses: Rash      insulin glargine (Toujeo Max SoloStar) 300 units/mL CONCENTRATED U-300 injection pen (2-unit dial) Inject 22 Units under the skin daily  Qty: 9 mL, Refills: 1 Associated Diagnoses: Type 2 diabetes mellitus without complication, with long-term current use of insulin ([facility])      lidocaine (XYLOCAINE) 5 % ointment APPLY TOPICALLY 2GM TWICE A DAY TO AFFECTED AREAS AS NEEDED FOR MILD PAIN  Qty: 250 g, Refills: 8    Associated Diagnoses: Chronic pain syndrome      NON FORMULARY [facility]      nortriptyline (PAMELOR) 10 mg capsule Take 1 capsule (10 mg total) by mouth daily at bedtime  Qty: 30 capsule, Refills: 2    Associated Diagnoses: Lumbar radiculopathy      NovoLOG FlexPen 100 units/mL injection pen INJECT 6 UNITS UNDER THE SKIN THREE TIMES A DAY WITH MEALS  Qty: 15 mL, Refills: 1    Associated Diagnoses: Type 2 diabetes mellitus without complication, with long-term current use of insulin ([facility])      nystatin-triamcinolone (MYCOLOG-II) ointment Apply topically 2 (two) times a day  Qty: 30 g, Refills: 1    Associated Diagnoses: Rash      pantoprazole (PROTONIX) 40 mg tablet Take 1 tablet (40 mg total) by mouth 2 (two) times a day before meals  Qty: 180 tablet, Refills: 1    Associated Diagnoses: Gastroesophageal reflux disease without esophagitis      pregabalin (LYRICA) 100 mg capsule Take 1 capsule (100 mg total) by mouth 3 (three) times a day  Qty: 90 capsule, Refills: 1    Associated Diagnoses: Chronic pain syndrome      promethazine (PHENERGAN) 25 mg tablet Take 1 tablet (25 mg total) by mouth every 6 (six) hours as needed for nausea or vomiting  Qty: 60 tablet, Refills: 3    Associated Diagnoses: Nausea and vomiting      Restasis 0 05 % ophthalmic emulsion       scopolamine (TRANSDERM-SCOP) 1 5 mg/3 days TD 72 hr patch Place 1 patch on the skin every third day  Qty: 10 patch, Refills: 0    Associated Diagnoses: Pyelonephritis      Symbicort 80-4 5 MCG/ACT inhaler INHALE 2 PUFFS BY MOUTH TWICE A DAY RINSE MOUTH AFTER USE  Qty: 10 2 g, Refills: 2    Associated Diagnoses: Mild persistent asthma without complication      TiZANidine (ZANAFLEX) 4 MG capsule TAKE ONE CAPSULE BY MOUTH THREE TIMES A DAY  Qty: 90 capsule, Refills: 1    Associated Diagnoses: Cervical radiculopathy; Lumbar spondylosis       ! ! - Potential duplicate medications found  Please discuss with provider  No discharge procedures on file      PDMP Review       Value Time User    PDMP Reviewed  Yes 2/4/2023 10:55 PM Jovan Regalado PA-C          ED Provider  Electronically Signed by           Molly Oliver DO  02/19/23 [id number]

## 2023-02-19 NOTE — ASSESSMENT & PLAN NOTE
[facility]        Patient: Maggie Gaines Date of Service: 2020   : 1976 MRN: [id number]     SUBJECTIVE:   HISTORY OF PRESENT ILLNESS:  Maggie Gaines is a 43 year old female who presents today for complaint of flu-like symptoms x 2 days.  Associated symptoms:fever, chills, nasal discharge, body aches, PND, dry cough  Denies:n/v/d  Home remedies taken/done:ibuprofen  Exposure:+exposure to flu -coworkers x 2  -recent air travel denies    Has received Influenza vaccine this year  Accompanied by:young daughter  LMP:2019-menopausal      PAST MEDICAL HISTORY:  No past medical history on file.    MEDICATIONS:  No current outpatient medications on file.     No current facility-administered medications for this visit.        ALLERGIES:  ALLERGIES:   Allergen Reactions   • Sulfa Antibiotics HIVES       PAST SURGICAL HISTORY:  No past surgical history on file.    FAMILY HISTORY:  Family History   Problem Relation Age of Onset   • Cancer, Endometrial Mother    • Cancer, Breast Neg Hx    • Cancer, Colon Neg Hx    • Cancer, Ovarian Neg Hx        SOCIAL HISTORY:  Social History     Tobacco Use   • Smoking status: Never Smoker   • Smokeless tobacco: Never Used   Substance Use Topics   • Alcohol use: Yes     Comment: occasionally   • Drug use: Never       Review of Systems  General: negative except for HPI  Eye Problem(s):negative   ENT Problem(s):negative  except for HPI  Cardiovascular problem(s):negative   Respiratory problem(s):negative  except for HPI  Gastro-intestinal problem(s):negative  Genito-urinary problem(s):negative   Musculoskeletal problem(s):negative   Integumentary problem(s):negative   Neurological problem(s):negative   Psychiatric problem(s):negative   Endocrine problem(s):negative   Hematologic and/or Lymphatic problem(s):negative       OBJECTIVE:     Physical Exam  Constitutional: alert, in no acute distress and current vital signs reviewed.   Head and Face :  Follows with rheumatology atraumatic, no deformities, normocephalic. no sinus tenderness   ENT : normal appearing outer ear, examination of the tympanic membrane showed normal landmarks, normal appearing external canal. nasal mucosa moist and pink. Scant clear-whitish nasal discharge. Bilateral nasal turbinates:  red/swollen. normal lips. oral mucosa pink and moist, no oral lesions and normal appearing tongue . Slight erythema to posterior pharyngeal area. Tonsils normal but erythematous. NO exudates.  Lymphatic : no lymphadenopathy   Pulmonary: no respiratory distress, normal respiratory rate and effort and no accessory muscle use. Breath sounds: CTAB No wheezing, rales or rhonchi noted. +occ dry cough  Cardiovascular: normal rate, regular rhythm, S1 and S2, no murmurs  Musculoskeletal: normal gait.  Integumentary: skin warm dry and intact. No rashes, lesions, skin breakdown  Neurologic : Alert, oriented to person, place and time. Speech clear  Psychiatric: Pleasant, calm, cooperative    Visit Vitals  /79 (BP Location: RUE - Right upper extremity, Patient Position: Sitting)   Pulse (!) 104   Temp 99.7 °F (37.6 °C) (Oral)   Wt 60.3 kg (132 lb 15 oz)   LMP 07/01/2019 (Approximate)   SpO2 98%   BMI 24.31 kg/m²       DIAGNOSTIC STUDIES:   LAB/RADIOLOGY RESULTS:    No results found for this visit on 02/17/20.  ASSESSMENT AND PLAN:     Flu-like symptoms  (primary encounter diagnosis)  Exposure to influenza    Plan:   POCT INFLUENZA A/B-negative  Flonase and Tamiflu as prescribed  Tylenol or ibuprofen as prescribed  Supportive care per AVS    Follow-up & Referral:  Follow-up with your PCP in 3-5 days or earlier if symptoms do not improve or worsens  Follow up for any questions, concerns, or worsening of symptoms.     ER precautions: Go to ER if with worsening of symptoms anahy shortness of breath, chest pain, palpitations, dizziness and lethargy    Medical compliance with plan discussed and risks of noncompliance reviewed    Patient verbalizes  understanding of treatment plan    Additional Notes:   Written handout given.    Patient verbalized understanding of the plan.    Medical compliance with plan discussed and risks of non-compliance reviewed.    Proper usage and side effects of medications reviewed & discussed.        Take medication as directed.     Thank you for visiting [facility].      Yandy Murphy CNP  [facility]

## 2023-02-19 NOTE — ASSESSMENT & PLAN NOTE
Sinus bradycardia with sinus arrhythmia  Rates in the 40s in the emergency department  Monitor overnight on telemetry  Likely vagal response in setting of significant illness/vomiting

## 2023-02-19 NOTE — ASSESSMENT & PLAN NOTE
Lab Results   Component Value Date    HGBA1C 5 7 02/28/2022       No results for input(s): POCGLU in the last 72 hours      Blood Sugar Average: Last 72 hrs:     Previously was on insulin but hemoglobin A1c has been well controlled  Sliding scale insulin every 6 hours while NPO

## 2023-02-19 NOTE — ASSESSMENT & PLAN NOTE
Presents with intractable nausea and vomiting x1 day history  Denies sick contacts  Patient has had multiple previous episodes of admissions for pancreatitis  Denies alcohol use    Possibly secondary to viral gastroenteritis, gastroparesis, pancreatitis with elevated lipase, peptic ulcer disease, cyclic vomiting syndrome with daily marijuana use  N p o  sips with meds  IV fluids, IV Zofran, IV Protonix  As needed analgesia

## 2023-02-19 NOTE — DISCHARGE INSTRUCTIONS
Follow up with family doctor and GI  Zofran as needed, stay hydrated    Return to the ED if any new/worsening symptoms or concerns

## 2023-02-19 NOTE — ASSESSMENT & PLAN NOTE
History of hypertension  Presents with hypertensive urgency likely secondary to severe pain  IV hydralazine PRN, avoid beta-blockers due to bradycardia  Oral medications when patient can tolerate

## 2023-02-20 VITALS
WEIGHT: 152.12 LBS | HEART RATE: 81 BPM | TEMPERATURE: 98.4 F | DIASTOLIC BLOOD PRESSURE: 80 MMHG | BODY MASS INDEX: 27.99 KG/M2 | HEIGHT: 62 IN | RESPIRATION RATE: 19 BRPM | SYSTOLIC BLOOD PRESSURE: 150 MMHG | OXYGEN SATURATION: 98 %

## 2023-02-20 LAB
GLUCOSE SERPL-MCNC: 96 MG/DL (ref 65–140)
GLUCOSE SERPL-MCNC: 98 MG/DL (ref 65–140)

## 2023-02-20 RX ORDER — OXYCODONE HYDROCHLORIDE 15 MG/1
15 TABLET ORAL EVERY 4 HOURS PRN
Qty: 180 TABLET | Refills: 0 | Status: SHIPPED | OUTPATIENT
Start: 2023-02-20

## 2023-02-20 RX ADMIN — SODIUM CHLORIDE, SODIUM LACTATE, POTASSIUM CHLORIDE, AND CALCIUM CHLORIDE 150 ML/HR: .6; .31; .03; .02 INJECTION, SOLUTION INTRAVENOUS at 05:05

## 2023-02-20 RX ADMIN — OXYCODONE HYDROCHLORIDE 20 MG: 10 TABLET ORAL at 01:48

## 2023-02-20 NOTE — PLAN OF CARE
Problem: MOBILITY - ADULT  Goal: Maintain or return to baseline ADL function  Description: INTERVENTIONS:  -  Assess patient's ability to carry out ADLs; assess patient's baseline for ADL function and identify physical deficits which impact ability to perform ADLs (bathing, care of mouth/teeth, toileting, grooming, dressing, etc )  - Assess/evaluate cause of self-care deficits   - Assess range of motion  - Assess patient's mobility; develop plan if impaired  - Assess patient's need for assistive devices and provide as appropriate  - Encourage maximum independence but intervene and supervise when necessary  - Involve family in performance of ADLs  - Assess for home care needs following discharge   - Consider OT consult to assist with ADL evaluation and planning for discharge  - Provide patient education as appropriate  Outcome: Progressing  Goal: Maintains/Returns to pre admission functional level  Description: INTERVENTIONS:  - Perform BMAT or MOVE assessment daily    - Set and communicate daily mobility goal to care team and patient/family/caregiver     - Collaborate with rehabilitation services on mobility goals if consulted  - Out of bed for meals 3 times a day  - Out of bed for toileting  - Record patient progress and toleration of activity level   Outcome: Progressing     Problem: PAIN - ADULT  Goal: Verbalizes/displays adequate comfort level or baseline comfort level  Description: Interventions:  - Encourage patient to monitor pain and request assistance  - Assess pain using appropriate pain scale  - Administer analgesics based on type and severity of pain and evaluate response  - Implement non-pharmacological measures as appropriate and evaluate response  - Consider cultural and social influences on pain and pain management  - Notify physician/advanced practitioner if interventions unsuccessful or patient reports new pain  Outcome: Progressing     Problem: INFECTION - ADULT  Goal: Absence or prevention of progression during hospitalization  Description: INTERVENTIONS:  - Assess and monitor for signs and symptoms of infection  - Monitor lab/diagnostic results  - Monitor all insertion sites, i e  indwelling lines, tubes, and drains  - Monitor endotracheal if appropriate and nasal secretions for changes in amount and color  - [place] appropriate cooling/warming therapies per order  - Administer medications as ordered  - Instruct and encourage patient and family to use good hand hygiene technique  - Identify and instruct in appropriate isolation precautions for identified infection/condition  Outcome: Progressing     Problem: SAFETY ADULT  Goal: Maintain or return to baseline ADL function  Description: INTERVENTIONS:  -  Assess patient's ability to carry out ADLs; assess patient's baseline for ADL function and identify physical deficits which impact ability to perform ADLs (bathing, care of mouth/teeth, toileting, grooming, dressing, etc )  - Assess/evaluate cause of self-care deficits   - Assess range of motion  - Assess patient's mobility; develop plan if impaired  - Assess patient's need for assistive devices and provide as appropriate  - Encourage maximum independence but intervene and supervise when necessary  - Involve family in performance of ADLs  - Assess for home care needs following discharge   - Consider OT consult to assist with ADL evaluation and planning for discharge  - Provide patient education as appropriate  Outcome: Progressing  Goal: Maintains/Returns to pre admission functional level  Description: INTERVENTIONS:  - Perform BMAT or MOVE assessment daily    - Set and communicate daily mobility goal to care team and patient/family/caregiver     - Collaborate with rehabilitation services on mobility goals if consulted  - Out of bed for meals 3 times a day  - Out of bed for toileting  - Record patient progress and toleration of activity level   Outcome: Progressing  Goal: Patient will remain free of falls  Description: INTERVENTIONS:  - Educate patient/family on patient safety including physical limitations  - Instruct patient to call for assistance with activity   - Consult OT/PT to assist with strengthening/mobility   - Keep Call bell within reach  - Keep bed low and locked with side rails adjusted as appropriate  - Keep care items and personal belongings within reach  - Initiate and maintain comfort rounds  - Make Fall Risk Sign visible to staff  - Apply yellow socks and bracelet for high fall risk patients  - Consider moving patient to room near nurses station  Outcome: Progressing     Problem: DISCHARGE PLANNING  Goal: Discharge to home or other facility with appropriate resources  Description: INTERVENTIONS:  - Identify barriers to discharge w/patient and caregiver  - Arrange for needed discharge resources and transportation as appropriate  - Identify discharge learning needs (meds, wound care, etc )  - Arrange for interpretive services to assist at discharge as needed  - Refer to Case Management Department for coordinating discharge planning if the patient needs post-hospital services based on physician/advanced practitioner order or complex needs related to functional status, cognitive ability, or social support system  Outcome: Progressing     Problem: Knowledge Deficit  Goal: Patient/family/caregiver demonstrates understanding of disease process, treatment plan, medications, and discharge instructions  Description: Complete learning assessment and assess knowledge base    Interventions:  - Provide teaching at level of understanding  - Provide teaching via preferred learning methods  Outcome: Progressing     Problem: GASTROINTESTINAL - ADULT  Goal: Minimal or absence of nausea and/or vomiting  Description: INTERVENTIONS:  - Administer IV fluids if ordered to ensure adequate hydration  - Maintain NPO status until nausea and vomiting are resolved  - Nasogastric tube if ordered  - Administer ordered antiemetic medications as needed  - Provide nonpharmacologic comfort measures as appropriate  - Advance diet as tolerated, if ordered  - Consider nutrition services referral to assist patient with adequate nutrition and appropriate food choices  Outcome: Progressing  Goal: Maintains or returns to baseline bowel function  Description: INTERVENTIONS:  - Assess bowel function  - Encourage oral fluids to ensure adequate hydration  - Administer IV fluids if ordered to ensure adequate hydration  - Administer ordered medications as needed  - Encourage mobilization and activity  - Consider nutritional services referral to assist patient with adequate nutrition and appropriate food choices  Outcome: Progressing  Goal: Maintains adequate nutritional intake  Description: INTERVENTIONS:  - Monitor percentage of each meal consumed  - Identify factors contributing to decreased intake, treat as appropriate  - Assist with meals as needed  - Monitor I&O, weight, and lab values if indicated  - Obtain nutrition services referral as needed  Outcome: Progressing     Problem: METABOLIC, FLUID AND ELECTROLYTES - ADULT  Goal: Electrolytes maintained within normal limits  Description: INTERVENTIONS:  - Monitor labs and assess patient for signs and symptoms of electrolyte imbalances  - Administer electrolyte replacement as ordered  - Monitor response to electrolyte replacements, including repeat lab results as appropriate  - Instruct patient on fluid and nutrition as appropriate  Outcome: Progressing  Goal: Fluid balance maintained  Description: INTERVENTIONS:  - Monitor labs   - Monitor I/O and WT  - Instruct patient on fluid and nutrition as appropriate  - Assess for signs & symptoms of volume excess or deficit  Outcome: Progressing  Goal: Glucose maintained within target range  Description: INTERVENTIONS:  - Monitor Blood Glucose as ordered  - Assess for signs and symptoms of hyperglycemia and hypoglycemia  - Administer ordered medications to maintain glucose within target range  - Assess nutritional intake and initiate nutrition service referral as needed  Outcome: Progressing     Problem: MUSCULOSKELETAL - ADULT  Goal: Maintain or return mobility to safest level of function  Description: INTERVENTIONS:  - Assess patient's ability to carry out ADLs; assess patient's baseline for ADL function and identify physical deficits which impact ability to perform ADLs (bathing, care of mouth/teeth, toileting, grooming, dressing, etc )  - Assess/evaluate cause of self-care deficits   - Assess range of motion  - Assess patient's mobility  - Assess patient's need for assistive devices and provide as appropriate  - Encourage maximum independence but intervene and supervise when necessary  - Involve family in performance of ADLs  - Assess for home care needs following discharge   - Consider OT consult to assist with ADL evaluation and planning for discharge  - Provide patient education as appropriate  Outcome: Progressing     Problem: Prexisting or High Potential for Compromised Skin Integrity  Goal: Skin integrity is maintained or improved  Description: INTERVENTIONS:  - Identify patients at risk for skin breakdown  - Assess and monitor skin integrity  - Assess and monitor nutrition and hydration status  - Monitor labs   - Assess for incontinence   - Turn and reposition patient  - Assist with mobility/ambulation  - Relieve pressure over bony prominences  - Avoid friction and shearing  - Provide appropriate hygiene as needed including keeping skin clean and dry  - Evaluate need for skin moisturizer/barrier cream  - Collaborate with interdisciplinary team   - Patient/family teaching  - Consider wound care consult   Outcome: Progressing

## 2023-02-20 NOTE — DISCHARGE SUMMARY
[facility] [facility]  Discharge- Nikkysantiago [facility] 1970, 46 y o  female MRN: [id number]  Unit/Bed#: E5 -01 Encounter: [id number]  Primary Care Provider: Jalen Lombardi PA-C   Date and time admitted to hospital: 2/19/2023  9:47 AM      Called to evaluate patient  IVs were self removed, states that she feels better in the hospital   She has not tried solid food yet like coming to take her home    She has signed AMA and reports that she will return to the emergency department if symptoms persist

## 2023-02-21 ENCOUNTER — OFFICE VISIT (OUTPATIENT)
Dept: RHEUMATOLOGY | Facility: CLINIC | Age: 53
End: 2023-02-21

## 2023-02-21 DIAGNOSIS — Z79.899 HIGH RISK MEDICATION USE: ICD-10-CM

## 2023-02-21 DIAGNOSIS — M79.645 PAIN IN FINGER OF LEFT HAND: ICD-10-CM

## 2023-02-21 DIAGNOSIS — M85.80 OSTEOPENIA, UNSPECIFIED LOCATION: Primary | ICD-10-CM

## 2023-02-23 ENCOUNTER — OFFICE VISIT (OUTPATIENT)
Dept: PAIN MEDICINE | Facility: MEDICAL CENTER | Age: 53
End: 2023-02-23

## 2023-02-23 VITALS
BODY MASS INDEX: 27.82 KG/M2 | SYSTOLIC BLOOD PRESSURE: 117 MMHG | DIASTOLIC BLOOD PRESSURE: 74 MMHG | HEIGHT: 62 IN | HEART RATE: 59 BPM

## 2023-02-23 DIAGNOSIS — M54.12 CERVICAL RADICULOPATHY: Primary | Chronic | ICD-10-CM

## 2023-02-23 DIAGNOSIS — M79.18 MYOFASCIAL PAIN SYNDROME: ICD-10-CM

## 2023-02-23 DIAGNOSIS — M47.816 LUMBAR SPONDYLOSIS: ICD-10-CM

## 2023-02-23 DIAGNOSIS — M51.16 LUMBAR DISC DISEASE WITH RADICULOPATHY: ICD-10-CM

## 2023-02-23 RX ORDER — LIDOCAINE HYDROCHLORIDE 10 MG/ML
4 INJECTION, SOLUTION EPIDURAL; INFILTRATION; INTRACAUDAL; PERINEURAL ONCE
Status: COMPLETED | OUTPATIENT
Start: 2023-02-23 | End: 2023-02-23

## 2023-02-23 RX ADMIN — LIDOCAINE HYDROCHLORIDE 4 ML: 10 INJECTION, SOLUTION EPIDURAL; INFILTRATION; INTRACAUDAL; PERINEURAL at 11:56

## 2023-02-23 NOTE — PATIENT INSTRUCTIONS
Epidural Steroid Injection   WHAT YOU NEED TO KNOW:   What do I need to know about an epidural steroid injection (DUC)? An DUC is a procedure to inject steroid medicine into the epidural space  The epidural space is between your spinal cord and vertebrae  Steroids reduce inflammation and fluid buildup in your spine that may be causing pain  You may be given pain medicine along with the steroids  How do I prepare for an DUC? Your provider will talk to you about how to prepare for your procedure  He or she will tell you what medicines to take or not take on the day of your procedure  You may need to stop taking blood thinners or other medicines several days before your procedure  You may need to adjust any diabetes medicine you take on the day of your procedure  Steroid medicine can increase your blood sugar level  Arrange for someone to drive you home when you are discharged  What will happen during an DUC? You will be given medicine to numb the procedure area  You will be awake for the procedure, but you will not feel pain  You may also be given medicine to help you relax  Contrast liquid will be used to help your provider see the area better  Tell the provider if you have ever had an allergic reaction to contrast liquid  Your provider may place the needle into your neck area, middle of your back, or tailbone area  He or she may inject the medicine next to the nerves that are causing your pain  He or she may instead inject the medicine into a larger area of the epidural space  This helps the medicine spread to more nerves  Your provider will use a fluoroscope to help guide the needle to the right place  A fluoroscope is a type of x-ray  After the procedure, a bandage will be placed over the injection site to prevent infection  What will happen after an DUC? You will have a bandage over the injection site to prevent infection   Your provider will tell you when you can bathe and any activity guidelines  You will be able to go home  What are the risks of an DUC? You may have temporary or permanent nerve damage or paralysis  You may have bleeding or develop a serious infection, such as meningitis (swelling of the brain coverings)  An abscess may also develop  An abscess is a pus-filled area under the skin  You may need surgery to fix the abscess  You may have a seizure, anxiety, or trouble sleeping  If you are a man, you may have temporary erectile dysfunction (not able to have an erection)  CARE AGREEMENT:   You have the right to help plan your care  Learn about your health condition and how it may be treated  Discuss treatment options with your healthcare providers to decide what care you want to receive  You always have the right to refuse treatment  The above information is an  only  It is not intended as medical advice for individual conditions or treatments  Talk to your doctor, nurse or pharmacist before following any medical regimen to see if it is safe and effective for you  © Copyright [facility] 2022 Information is for End User's use only and may not be sold, redistributed or otherwise used for commercial purposes  Trigger Point Injection   AMBULATORY CARE:   A trigger point injection  is used to relax a muscle knot  This helps relieve pain  You may be able to have more than one trigger point treated during a session  How to prepare for a trigger point injection:   Your healthcare provider will tell you how to prepare  Arrange to have someone drive you home after the injection  Tell your provider about all medicines you take, including pain medicine, blood thinners, and muscle relaxers  He or she will tell you if you need to stop any medicine for the injection, and when to stop  He or she will tell you which medicines to take or not take on the day of the injection  Tell your provider about all your allergies, including to any pain medicine      What will happen during a trigger point injection:   You may be sitting or lying, depending on where the trigger point is located  Your healthcare provider will feel for a knot in the muscle  He or she may jorge your skin over the knot  Your provider will put a needle through your skin and into the trigger point  Saline (salt solution), pain relievers, or other medicines may be pushed through the needle into the trigger point  Your provider may use only a dry needle (no medicine)  He or she will pull the needle almost all the way out and then push it in again  He or she will repeat this several times until the muscle stops twitching or feels relaxed  Your provider will remove the needle and stretch the muscle area  He or she may apply pressure to the area for 2 minutes  A bandage will be put over the injection site to prevent bleeding or an infection  What to expect after a trigger point injection:   You may feel pain relief right away  It is normal for some pain to start again 2 hours later  An ice pack or over-the-counter pain medicine can help lower the pain  You may feel sore in the injection site for a few days  If you need another injection in the same area, wait until the area is not sore  Your healthcare provider may give you specific activity instructions to follow at home or recommend physical therapy  In general, you should try to stay active  Avoid strenuous activity for the first 3 or 4 days after the injection  Do not have more injections if you still have trigger point pain after 2 or 3 injections  Risks of a trigger point injection:  You may have a severe allergic reaction to pain medicine injected  The injection may be painful, or you may be sore where you got the injection  You may bleed, bruise, or develop an infection in the injection area  The injection may cause you to feel faint   Rarely, the needle may cause muscle or blood vessel damage or your lung may collapse if you get the injection near your chest   Call your local emergency number (911 in the [address]), or have someone call if:   Your mouth and throat are swollen  You are wheezing or have trouble breathing  You have chest pain or your heart is beating faster than usual     You feel like you are going to faint  When should I seek immediate care? Your face is red or swollen  You have hives that spread over your body  You feel weak or dizzy  Call your doctor or pain specialist if:   You have a fever within 1 week of the injection  You have redness or swelling within 1 week of the injection  You have new or worsening pain near the injection site  You have questions or concerns about your condition or care  Self-care:   Stay active after you have trigger point injections  Gently move your joints through their full range of motion during the first week  Avoid strenuous activity for 3 or 4 days  Do regular stretches of the trigger point muscle  Place gentle pressure on the trigger point, and then stretch the muscle  Ask your healthcare provider for more information about how to stretch and apply pressure  Apply ice to the injection site  Use an ice pack, or put ice in a plastic bag  Cover the bag with a towel before you apply it  Apply ice for 15 to 20 minutes every hour, or as directed  Apply heat to trigger point sites  Heat can help relax muscles and relieve trigger point pain  Use a heat pack or a heating pad set on low  Apply heat for 15 minutes every hour, or as directed  Follow up with your doctor or pain specialist as directed:  Write down your questions so you remember to ask them during your visits  © Copyright [employer] Montesinos 2022 Information is for End User's use only and may not be sold, redistributed or otherwise used for commercial purposes  The above information is an  only  It is not intended as medical advice for individual conditions or treatments   Talk to your doctor, nurse or pharmacist before following any medical regimen to see if it is safe and effective for you

## 2023-02-23 NOTE — PROGRESS NOTES
Assessment:  1  Cervical radiculopathy    2  Myofascial pain syndrome    3  Lumbar disc disease with radiculopathy    4  Lumbar spondylosis        Plan:  While the patient was in the office today, I did have a thorough conversation regarding their chronic pain syndrome, medication management, and treatment plan options  After discussing options I have recommended the patient proceed with a C7-T1 interlaminar epidural steroid injection to address the increased neck and upper extremity radicular component of her pain pattern  This injection has been very beneficial reducing the same pain pattern she is currently experiencing  In order to address the myofascial component of her pain pattern in the low back, we will perform trigger point injections today  Procedure Note:  After fully informed written consent was obtained, the above procedure was performed  Using aseptic technique a 25-gauge needle was placed in the region of the bilateral paraspinal muscles of the lumbar spine  Approximately 4 cc of 1% Lidocaine was injected in a fan-like fashion after negative aspiration with each cc  After adequate anesthesia was obtained, the areas were dry-needled  Complications:  None  Disposition: Motor function was intact  Vital signs stable  The patient was discharged home  The discharge instruction sheet was given to the patient  The patient was discharged with instructions to call immediately if there are any complications  I did review the trigger point injection discharge instructions with the patient  I explained that the best results from the injections typically occur within the next 3-5 days  I did explain that it is normal to feel an increase in soreness and/or pain, and even bruising  I did encourage heat/cold regiments, which ever decreases pain, as well as continuing a home stretching program     We discussed the use of a TENS unit which she does have at home      Follow-up after the procedure  Patient understands and agrees with the treatment plan  History of Present Illness: The patient is a 46 y o  female presents today for pain management follow-up visit with complaints of neck pain and low back pain  She presently rates her pain an 8 out of 10 on the pain scale describes it as a constant burning, sharp, throbbing, pressure-like and shooting pain with numbness and paresthesias in bilateral lower extremities and the left upper extremity  On 12/13/2022 she underwent a C7-T1 epidural steroid injection and was able to report 70% reduction in pain up until recently  Most recently on 1/25/2023 she underwent bilateral sacroiliac joint injections with no noticeable improvement  She continues with back pain, spasms as well as bilateral lower extremity radicular symptoms  I have personally reviewed and/or updated the patient's past medical history, past surgical history, family history, social history, current medications, allergies, and vital signs today  Review of Systems:    Review of Systems   Respiratory: Negative for shortness of breath  Cardiovascular: Negative for chest pain  Gastrointestinal: Negative for constipation, diarrhea, nausea and vomiting  Musculoskeletal: Positive for back pain, gait problem, joint swelling and myalgias  Negative for arthralgias  Skin: Negative for rash  Neurological: Positive for weakness  Negative for dizziness and seizures  All other systems reviewed and are negative  Past Medical History:   Diagnosis Date   • ADHD    • Anemia of chronic disease    • Anxiety    • Arthritis ? • Asthma    • Bipolar disorder ([place] [address] )    • Borderline personality disorder ([place] [address] )    • Cataplexy    • Chronic abdominal pain    • Chronic kidney disease ? • CKD (chronic kidney disease) stage 3, GFR 30-59 ml/min ([facility])    • Cushing syndrome ([facility])    • Depression ?    • Diabetes mellitus ([place] [address] )    • DVT (deep venous thrombosis) ([facility])    • GERD (gastroesophageal reflux disease)    • Headache(784 0) 3 months   • History of acute pancreatitis     felt secondary to Bactrim   • History of transfusion    • Hypertension    • Liver disease     fatty liver   • Microscopic polyangiitis ([facility])    • Ovarian cyst    • PTSD (post-traumatic stress disorder)    • Self-inflicted injury     self inflicted skin wounds   • Sleep apnea    • Wegener's granulomatosis with renal involvement ([facility] [address] )        Past Surgical History:   Procedure Laterality Date   • ESOPHAGOGASTRODUODENOSCOPY  2015    mild antral gastritis   • GASTRIC STIMULATOR IMPLANT SURGERY  2020   • NH COLONOSCOPY FLX DX W/COLLJ SPEC WHEN PFRMD N/A 2018    adenoma removed from the transverse, hyperplastic polyp removed from the left colon   • NH ESOPHAGOGASTRODUODENOSCOPY TRANSORAL DIAGNOSTIC N/A 2018    gastritis and scant coffee-ground material   Biopsies negative for H  pylori   • NH OPEN TX RADIAL&ULNAR SHAFT FX W/FIXJ RADIUS&ULNA Left 2022    Procedure: OPEN REDUCTION W/ INTERNAL FIXATION (ORIF) RADIUS / ULNA ([facility]); Surgeon: Corona Green MD;  Location: [facility];  Service: Orthopedics   • RELEASE SCAR CONTRACTURE / GRAFT REPAIRS OF HAND Bilateral    • UPPER GASTROINTESTINAL ENDOSCOPY  2019    Dr Savannah Mcgraw   Botox to the pylorus       Family History   Problem Relation Age of Onset   • Arthritis Mother    • Depression Mother    • Diabetes Mother    • Mental illness Mother    • Migraines Mother    • No Known Problems Father    • Colon cancer Neg Hx    • Drug abuse Neg Hx         mother father   • Mental illness Neg Hx         disorder, mother father   • Cancer Neg Hx    • Breast cancer Neg Hx        Social History     Occupational History   • Occupation: disability   Tobacco Use   • Smoking status: Former     Packs/day:      Years: 10      Pack years: 10      Types: Cigarettes     Quit date: 2011     Years since quittin 1   • Smokeless tobacco: Never   • Tobacco comments:     Stopped smoking 11 years ago   Vaping Use   • Vaping Use: Never used   Substance and Sexual Activity   • Alcohol use: Never   • Drug use: Yes     Types: Marijuana     Comment: marijuana daily   • Sexual activity: Not Currently     Partners: Male     Birth control/protection: None         Current Outpatient Medications:   •  amphetamine-dextroamphetamine (ADDERALL XR) 20 MG 24 hr capsule, Take 1 capsule (20 mg total) by mouth 2 (two) times a day Max Daily Amount: 40 mg, Disp: 60 capsule, Rfl: 0  •  dicyclomine (BENTYL) 20 mg tablet, Take 1 tablet (20 mg total) by mouth in the morning and 1 tablet (20 mg total) in the evening  Do all this for 7 days  , Disp: 20 tablet, Rfl: 0  •  haloperidol (HALDOL) 5 mg tablet, TAKE ONE TABLET BY MOUTH ONCE DAILY AS NEEDED FOR ABDOMINAL PAIN / NAUSEA, Disp: 10 tablet, Rfl: 0  •  Linzess 72 MCG CAPS, TAKE ONE CAPSULE BY MOUTH ONCE DAILY, Disp: 90 capsule, Rfl: 10  •  NON FORMULARY, [facility], Disp: , Rfl:   •  ondansetron (Zofran ODT) 8 mg disintegrating tablet, Take 1 tablet (8 mg total) by mouth every 8 (eight) hours as needed for nausea or vomiting, Disp: 30 tablet, Rfl: 2  •  oxyCODONE (Roxicodone) 15 mg immediate release tablet, Take 1 tablet (15 mg total) by mouth every 4 (four) hours as needed for moderate pain Max Daily Amount: 90 mg, Disp: 180 tablet, Rfl: 0  •  acetaminophen (TYLENOL) 500 mg tablet, Take 2 tablets (1,000 mg total) by mouth every 8 (eight) hours as needed for mild pain (Patient not taking: Reported on 2/19/2023), Disp: 60 tablet, Rfl: 1  •  al mag oxide-diphenhydramine-lidocaine viscous (MAGIC MOUTHWASH) 1:1:1 suspension, Swish and spit 10 mL every 4 (four) hours as needed for mouth pain or discomfort (Patient not taking: Reported on 2/19/2023), Disp: 90 mL, Rfl: 2  •  albuterol (2 5 mg/3 mL) 0 083 % nebulizer solution, USE 1 VIAL VIA NEBULIZER EVERY 6 HOURS AS NEEDED FOR WHEEZING OR SHORTNESS OF BREATH (Patient not taking: Reported on 2/19/2023), Disp: 90 mL, Rfl: 0  •  albuterol (PROVENTIL HFA,VENTOLIN HFA) 90 mcg/act inhaler, INHALE 2 PUFFS BY MOUTH EVERY 6 HOURS AS NEEDED FOR WHEEZING OR SHORTNESS OF BREATH (Patient not taking: Reported on 2/19/2023), Disp: 18 g, Rfl: 1  •  Alcohol Swabs (Alcohol Pads) 70 % PADS, Use 4 (four) times a day, Disp: 400 each, Rfl: 2  •  Blood Glucose Monitoring Suppl (FreeStyle Lite) DEIRDRE, Use daily, Disp: 1 each, Rfl: 0  •  Blood Pressure Monitor KIT, Use daily to check blood pressure , Disp: 1 kit, Rfl: 0  •  buPROPion (WELLBUTRIN XL) 300 mg 24 hr tablet, Take 1 tablet (300 mg total) by mouth every morning (Patient not taking: Reported on 2/19/2023), Disp: 90 tablet, Rfl: 1  •  Calcium 600 1500 (600 Ca) MG TABS, , Disp: , Rfl:   •  calcium carbonate (OS-ALISIA) 600 MG tablet, Take 1 tablet (600 mg total) by mouth daily (Patient not taking: Reported on 2/19/2023), Disp: 90 tablet, Rfl: 1  •  Cholecalciferol (Vitamin D) 50 MCG (2000 UT) tablet, Take 1 tablet (2,000 Units total) by mouth daily (Patient not taking: Reported on 2/19/2023), Disp: 90 tablet, Rfl: 1  •  clobetasol (TEMOVATE) 0 05 % cream, APPLY TOPICALLY TO AFFECTED AREA TWICE A DAY (Patient not taking: Reported on 2/19/2023), Disp: 60 g, Rfl: 0  •  Comfort Touch Insulin Pen Need 33G X 6 MM MISC, USE TO INJECT INSULIN FOUR TIMES A DAY ( WITH MEALS AND AT BEDTIME ), Disp: 100 each, Rfl: 10  •  Diclofenac Sodium (VOLTAREN) 1 %, APPLY 2GM TOPICALLY FOUR TIMES A DAY (Patient not taking: Reported on 2/19/2023), Disp: 200 g, Rfl: 2  •  Easy Comfort Lancets MISC, USE TO TEST THE BLOOD SUGAR THREE TIMES A DAY, Disp: 100 each, Rfl: 6  •  glucose blood (FREESTYLE LITE) test strip, USE TO TEST THE BLOOD SUGAR THREE TIMES A DAY AS DIRECTED, Disp: 100 strip, Rfl: 10  •  guaiFENesin (ROBITUSSIN) 100 MG/5ML oral liquid, Take 10 mL (200 mg total) by mouth 3 (three) times a day as needed for cough or congestion (Patient not taking: Reported on 2/19/2023), Disp: 473 mL, Rfl: 0  •  hydrocortisone 2 5 % cream, Apply topically 2 (two) times a day as needed for rash (Patient not taking: Reported on 2/19/2023), Disp: 30 g, Rfl: 1  •  insulin glargine (Toujeo Max SoloStar) 300 units/mL CONCENTRATED U-300 injection pen (2-unit dial), Inject 22 Units under the skin daily (Patient not taking: Reported on 2/19/2023), Disp: 9 mL, Rfl: 1  •  lidocaine (LMX) 4 % cream, Apply topically as needed for mild pain, Disp: 30 g, Rfl: 0  •  lidocaine (XYLOCAINE) 5 % ointment, APPLY TOPICALLY 2GM TWICE A DAY TO AFFECTED AREAS AS NEEDED FOR MILD PAIN (Patient taking differently: Lidocaine patches), Disp: 250 g, Rfl: 8  •  naloxone (Narcan) 4 mg/0 1 mL nasal spray, ADMINISTER 1 SPRAY IN ONE NOSTRIL IF NO REPONSE AFTER 2-3 MINUTES SPRAY INTO OTHER NOSTRIL WITH NEW spray, Disp: 2 each, Rfl: 2  •  nortriptyline (PAMELOR) 10 mg capsule, Take 1 capsule (10 mg total) by mouth daily at bedtime (Patient not taking: Reported on 2/19/2023), Disp: 30 capsule, Rfl: 2  •  NovoLOG FlexPen 100 units/mL injection pen, INJECT 6 UNITS UNDER THE SKIN THREE TIMES A DAY WITH MEALS (Patient not taking: Reported on 2/19/2023), Disp: 15 mL, Rfl: 1  •  nystatin-triamcinolone (MYCOLOG-II) ointment, Apply topically 2 (two) times a day (Patient not taking: Reported on 2/19/2023), Disp: 30 g, Rfl: 1  •  pantoprazole (PROTONIX) 40 mg tablet, Take 1 tablet (40 mg total) by mouth 2 (two) times a day before meals (Patient not taking: Reported on 2/19/2023), Disp: 180 tablet, Rfl: 1  •  pregabalin (LYRICA) 100 mg capsule, Take 1 capsule (100 mg total) by mouth 3 (three) times a day (Patient not taking: Reported on 2/19/2023), Disp: 90 capsule, Rfl: 1  •  promethazine (PHENERGAN) 25 mg tablet, Take 1 tablet (25 mg total) by mouth every 6 (six) hours as needed for nausea or vomiting (Patient not taking: Reported on 2/19/2023), Disp: 60 tablet, Rfl: 3  •  Restasis 0 05 % ophthalmic emulsion, , Disp: , Rfl:   •  scopolamine (TRANSDERM-SCOP) 1 5 mg/3 days TD 72 hr patch, Place 1 patch on the skin every third day (Patient not taking: Reported on 2/19/2023), Disp: 10 patch, Rfl: 0  •  Symbicort 80-4 5 MCG/ACT inhaler, INHALE 2 PUFFS BY MOUTH TWICE A DAY RINSE MOUTH AFTER USE (Patient not taking: Reported on 2/19/2023), Disp: 10 2 g, Rfl: 2  •  TiZANidine (ZANAFLEX) 4 MG capsule, TAKE ONE CAPSULE BY MOUTH THREE TIMES A DAY (Patient not taking: Reported on 2/19/2023), Disp: 90 capsule, Rfl: 1  No current facility-administered medications for this visit  Allergies   Allergen Reactions   • Prozac [Fluoxetine Hcl]      SI   • Bactrim [Sulfamethoxazole-Trimethoprim]      Pt "They think that is what cause the pancreatitis"    • Flagyl [Metronidazole] Diarrhea and Abdominal Pain   • Lamictal [Lamotrigine] GI Intolerance   • Lithium Other (See Comments)   • Ibuprofen    • Lexapro [Escitalopram Oxalate] Rash   • Navane [Thiothixene]      SI   • Other      "novaine?" antipsychotic       Physical Exam:    /74   Pulse 59   Ht 5' 2" (1 575 m)   LMP  (LMP Unknown)   BMI 27 82 kg/m²     Constitutional:normal, well developed, well nourished, alert, in no distress and non-toxic and no overt pain behavior  Eyes:anicteric  HEENT:grossly intact  Neck:supple, symmetric, trachea midline and no masses   Pulmonary:even and unlabored  Cardiovascular:No edema or pitting edema present  Skin:Normal without rashes or lesions and well hydrated  Psychiatric:Mood and affect appropriate  Neurologic:Cranial Nerves II-XII grossly intact  Musculoskeletal: In wheelchair  Lumbar spine surgical scar  Tenderness to palpation over the bilateral paraspinal muscles of the lumbar spine  Tenderness to palpation over the left paraspinal muscles of the cervical spine and limited range of motion        Imaging  FL spine and pain procedure    (Results Pending)         Orders Placed This Encounter   Procedures   • FL spine and pain procedure

## 2023-02-27 ENCOUNTER — HOSPITAL ENCOUNTER (OUTPATIENT)
Dept: RADIOLOGY | Facility: HOSPITAL | Age: 53
Discharge: HOME | End: 2023-02-27
Attending: ORTHOPAEDIC SURGERY

## 2023-02-27 ENCOUNTER — OFFICE VISIT (OUTPATIENT)
Dept: OBGYN CLINIC | Facility: HOSPITAL | Age: 53
End: 2023-02-27

## 2023-02-27 VITALS
WEIGHT: 152.12 LBS | HEART RATE: 59 BPM | DIASTOLIC BLOOD PRESSURE: 73 MMHG | HEIGHT: 62 IN | SYSTOLIC BLOOD PRESSURE: 110 MMHG | BODY MASS INDEX: 27.99 KG/M2

## 2023-02-27 DIAGNOSIS — S62.102S CLOSED FRACTURE OF LEFT WRIST, SEQUELA: ICD-10-CM

## 2023-02-27 DIAGNOSIS — M54.9 BACK PAIN: ICD-10-CM

## 2023-02-27 DIAGNOSIS — M77.8 LEFT WRIST TENDONITIS: Primary | ICD-10-CM

## 2023-02-27 DIAGNOSIS — M25.532 LEFT WRIST PAIN: ICD-10-CM

## 2023-02-27 DIAGNOSIS — M25.532 PAIN IN LEFT WRIST: ICD-10-CM

## 2023-02-27 DIAGNOSIS — Z87.81 S/P ORIF (OPEN REDUCTION INTERNAL FIXATION) FRACTURE: ICD-10-CM

## 2023-02-27 DIAGNOSIS — Z98.890 S/P ORIF (OPEN REDUCTION INTERNAL FIXATION) FRACTURE: ICD-10-CM

## 2023-02-27 RX ORDER — METHYLPREDNISOLONE 4 MG/1
TABLET ORAL
Qty: 1 EACH | Refills: 1 | Status: SHIPPED | OUTPATIENT
Start: 2023-02-27

## 2023-02-27 NOTE — PATIENT INSTRUCTIONS
Medrol dose daniel is a 6 days course:  1st day take 2 tablets with Breakfast, 2 tablets with Lunch and 2 tablets with an afternoon snack  Do not take after 3pm, due to medication can keep you up from sleep  Each day decrease by 1 tablet from the last dose  Complete daniel

## 2023-02-27 NOTE — PROGRESS NOTES
Assessment:   Diagnosis ICD-10-CM Associated Orders   1  Left wrist ECU tendonitis  M77 [id number] Ambulatory Referral to PT/OT Hand Therapy      2  Left wrist pain  M25 [id number] Ambulatory Referral to Orthopedic Surgery      3  S/P ORIF (open reduction internal fixation) fracture  [id number] [id number] Ambulatory Referral to Orthopedic Surgery    [id number]       4  Closed fracture of left wrist, sequela  S62 102S Ambulatory Referral to Orthopedic Surgery          Plan:  • X-rays of the left wrist were obtained today and reviewed noting that the hardware is intact with no signs of failure or loosening  • On exam most the patient's pain is located over the ulnar aspect along the ECU tendon  • At this time it is recommended to start occupational therapy to work on some exercises, use of topical Voltaren gel due to patient having chronic kidney disease and cannot take NSAIDs  • A Medrol Dosepak was sent to the patient's confirmed pharmacy  • If this does not give her significant relief of her pain discomfort, she can call in and we can refer to a hand surgeon for a possible injection  To do next visit:  Return for Patient will call in   The above stated was discussed in layman's terms and the patient expressed understanding  All questions were answered to the patient's satisfaction  Scribe Attestation    I,:  Debi Yoon am acting as a scribe while in the presence of the attending physician :       I,:  Jeri Palomo MD personally performed the services described in this documentation    as scribed in my presence :             Subjective:   Jose Mcwilliams is a 46 y o  female who presents today for increase pain in the left wrist She reports that she had gradual increase pain on the ulnar aspect of the wrist with twisting motions  She had sharp  She has a hx of a left wrist ORIF, 6/23/2022         Review of systems negative unless otherwise specified in HPI  Review of Systems   Constitutional: Negative for chills, fever and unexpected weight change  HENT: Negative for hearing loss, nosebleeds and sore throat  Eyes: Negative for pain, redness and visual disturbance  Respiratory: Negative for cough, shortness of breath and wheezing  Cardiovascular: Negative for chest pain, palpitations and leg swelling  Gastrointestinal: Negative for abdominal pain and nausea  Genitourinary: Negative for dyspareunia, dysuria and frequency  Skin: Negative for rash and wound  Neurological: Negative for dizziness, numbness and headaches  Psychiatric/Behavioral: Negative for decreased concentration and suicidal ideas  The patient is not nervous/anxious  Past Medical History:   Diagnosis Date   • ADHD    • Anemia of chronic disease    • Anxiety    • Arthritis ? • Asthma    • Bipolar disorder ([place] [address] )    • Borderline personality disorder ([place] [address] )    • Cataplexy    • Chronic abdominal pain    • Chronic kidney disease ? • CKD (chronic kidney disease) stage 3, GFR 30-59 ml/min ([facility])    • Cushing syndrome ([facility])    • Depression ?    • Diabetes mellitus ([place] [address] )    • DVT (deep venous thrombosis) ([place] [address] )    • GERD (gastroesophageal reflux disease)    • Headache(784 0) 3 months   • History of acute pancreatitis     felt secondary to Bactrim   • History of transfusion    • Hypertension    • Liver disease     fatty liver   • Microscopic polyangiitis ([facility])    • Ovarian cyst    • PTSD (post-traumatic stress disorder)    • Self-inflicted injury     self inflicted skin wounds   • Sleep apnea    • Wegener's granulomatosis with renal involvement ([place] [address] ) 2015       Past Surgical History:   Procedure Laterality Date   • ESOPHAGOGASTRODUODENOSCOPY  09/11/2015    mild antral gastritis   • GASTRIC STIMULATOR IMPLANT SURGERY  06/25/2020   • DE COLONOSCOPY FLX DX W/COLLJ SPEC WHEN PFRMD N/A 12/14/2018    adenoma removed from the transverse, hyperplastic polyp removed from the left colon   • DE ESOPHAGOGASTRODUODENOSCOPY TRANSORAL DIAGNOSTIC N/A 12/14/2018 gastritis and scant coffee-ground material   Biopsies negative for H  pylori   • WV OPEN TX RADIAL&ULNAR SHAFT FX W/FIXJ RADIUS&ULNA Left 2022    Procedure: OPEN REDUCTION W/ INTERNAL FIXATION (ORIF) RADIUS / ULNA ([facility]); Surgeon: Gonsalo Romero MD;  Location: [facility];  Service: Orthopedics   • RELEASE SCAR CONTRACTURE / GRAFT REPAIRS OF HAND Bilateral    • UPPER GASTROINTESTINAL ENDOSCOPY  2019    Dr Júnior Arguetaox to the pylorus       Family History   Problem Relation Age of Onset   • Arthritis Mother    • Depression Mother    • Diabetes Mother    • Mental illness Mother    • Migraines Mother    • No Known Problems Father    • Colon cancer Neg Hx    • Drug abuse Neg Hx         mother father   • Mental illness Neg Hx         disorder, mother father   • Cancer Neg Hx    • Breast cancer Neg Hx        Social History     Occupational History   • Occupation: disability   Tobacco Use   • Smoking status: Former     Packs/day: 1 00     Years: 10 00     Pack years: 10 00     Types: Cigarettes     Quit date: 2011     Years since quittin 1   • Smokeless tobacco: Never   • Tobacco comments:     Stopped smoking 11 years ago   Vaping Use   • Vaping Use: Never used   Substance and Sexual Activity   • Alcohol use: Never   • Drug use: Yes     Types: Marijuana     Comment: marijuana daily   • Sexual activity: Not Currently     Partners: Male     Birth control/protection: None         Current Outpatient Medications:   •  Alcohol Swabs (Alcohol Pads) 70 % PADS, Use 4 (four) times a day, Disp: 400 each, Rfl: 2  •  amphetamine-dextroamphetamine (ADDERALL XR) 20 MG 24 hr capsule, Take 1 capsule (20 mg total) by mouth 2 (two) times a day Max Daily Amount: 40 mg, Disp: 60 capsule, Rfl: 0  •  Blood Glucose Monitoring Suppl (FreeStyle Lite) DEIRDRE, Use daily, Disp: 1 each, Rfl: 0  •  Blood Pressure Monitor KIT, Use daily to check blood pressure , Disp: 1 kit, Rfl: 0  •  clobetasol (TEMOVATE) 0 05 % cream, APPLY TOPICALLY TO AFFECTED AREA TWICE A DAY, Disp: 60 g, Rfl: 2  •  Comfort Touch Insulin Pen Need 33G X 6 MM MISC, USE TO INJECT INSULIN FOUR TIMES A DAY ( WITH MEALS AND AT BEDTIME ), Disp: 100 each, Rfl: 10  •  Diclofenac Sodium (VOLTAREN) 1 %, APPLY TWO GRAMS TOPICALLY FOUR TIMES A DAY, Disp: 200 g, Rfl: 1  •  Easy Comfort Lancets MISC, USE TO TEST THE BLOOD SUGAR THREE TIMES A DAY, Disp: 100 each, Rfl: 6  •  glucose blood (FREESTYLE LITE) test strip, USE TO TEST THE BLOOD SUGAR THREE TIMES A DAY AS DIRECTED, Disp: 100 strip, Rfl: 10  •  haloperidol (HALDOL) 5 mg tablet, TAKE ONE TABLET BY MOUTH ONCE DAILY AS NEEDED FOR ABDOMINAL PAIN / NAUSEA, Disp: 10 tablet, Rfl: 0  •  lidocaine (LMX) 4 % cream, Apply topically as needed for mild pain, Disp: 30 g, Rfl: 0  •  lidocaine (XYLOCAINE) 5 % ointment, APPLY TOPICALLY 2GM TWICE A DAY TO AFFECTED AREAS AS NEEDED FOR MILD PAIN (Patient taking differently: Lidocaine patches), Disp: 250 g, Rfl: 8  •  Linzess 72 MCG CAPS, TAKE ONE CAPSULE BY MOUTH ONCE DAILY, Disp: 90 capsule, Rfl: 10  •  naloxone (Narcan) 4 mg/0 1 mL nasal spray, ADMINISTER 1 SPRAY IN ONE NOSTRIL IF NO REPONSE AFTER 2-3 MINUTES SPRAY INTO OTHER NOSTRIL WITH NEW spray, Disp: 2 each, Rfl: 2  •  NON FORMULARY, [facility], Disp: , Rfl:   •  ondansetron (Zofran ODT) 8 mg disintegrating tablet, Take 1 tablet (8 mg total) by mouth every 8 (eight) hours as needed for nausea or vomiting, Disp: 30 tablet, Rfl: 2  •  oxyCODONE (Roxicodone) 15 mg immediate release tablet, Take 1 tablet (15 mg total) by mouth every 4 (four) hours as needed for moderate pain Max Daily Amount: 90 mg, Disp: 180 tablet, Rfl: 0  •  acetaminophen (TYLENOL) 500 mg tablet, Take 2 tablets (1,000 mg total) by mouth every 8 (eight) hours as needed for mild pain (Patient not taking: Reported on 2/19/2023), Disp: 60 tablet, Rfl: 1  •  al mag oxide-diphenhydramine-lidocaine viscous (MAGIC MOUTHWASH) 1:1:1 suspension, Swish and spit 10 mL every 4 (four) hours as needed for mouth pain or discomfort (Patient not taking: Reported on 2/19/2023), Disp: 90 mL, Rfl: 2  •  albuterol (2 5 mg/3 mL) 0 083 % nebulizer solution, USE 1 VIAL VIA NEBULIZER EVERY 6 HOURS AS NEEDED FOR WHEEZING OR SHORTNESS OF BREATH (Patient not taking: Reported on 2/19/2023), Disp: 90 mL, Rfl: 0  •  albuterol (PROVENTIL HFA,VENTOLIN HFA) 90 mcg/act inhaler, INHALE 2 PUFFS BY MOUTH EVERY 6 HOURS AS NEEDED FOR WHEEZING OR SHORTNESS OF BREATH (Patient not taking: Reported on 2/19/2023), Disp: 18 g, Rfl: 1  •  buPROPion (WELLBUTRIN XL) 300 mg 24 hr tablet, Take 1 tablet (300 mg total) by mouth every morning (Patient not taking: Reported on 2/19/2023), Disp: 90 tablet, Rfl: 1  •  Calcium 600 1500 (600 Ca) MG TABS, , Disp: , Rfl:   •  calcium carbonate (OS-ALISIA) 600 MG tablet, Take 1 tablet (600 mg total) by mouth daily (Patient not taking: Reported on 2/19/2023), Disp: 90 tablet, Rfl: 1  •  Cholecalciferol (Vitamin D) 50 MCG (2000 UT) tablet, Take 1 tablet (2,000 Units total) by mouth daily (Patient not taking: Reported on 2/19/2023), Disp: 90 tablet, Rfl: 1  •  dicyclomine (BENTYL) 20 mg tablet, Take 1 tablet (20 mg total) by mouth in the morning and 1 tablet (20 mg total) in the evening  Do all this for 7 days  , Disp: 20 tablet, Rfl: 0  •  guaiFENesin (ROBITUSSIN) 100 MG/5ML oral liquid, Take 10 mL (200 mg total) by mouth 3 (three) times a day as needed for cough or congestion (Patient not taking: Reported on 2/19/2023), Disp: 473 mL, Rfl: 0  •  hydrocortisone 2 5 % cream, Apply topically 2 (two) times a day as needed for rash (Patient not taking: Reported on 2/19/2023), Disp: 30 g, Rfl: 1  •  insulin glargine (Toujeo Max SoloStar) 300 units/mL CONCENTRATED U-300 injection pen (2-unit dial), Inject 22 Units under the skin daily (Patient not taking: Reported on 2/19/2023), Disp: 9 mL, Rfl: 1  •  nortriptyline (PAMELOR) 10 mg capsule, Take 1 capsule (10 mg total) by mouth daily at bedtime (Patient not taking: Reported on 2/19/2023), Disp: 30 capsule, Rfl: 2  •  NovoLOG FlexPen 100 units/mL injection pen, INJECT 6 UNITS UNDER THE SKIN THREE TIMES A DAY WITH MEALS (Patient not taking: Reported on 2/19/2023), Disp: 15 mL, Rfl: 1  •  nystatin-triamcinolone (MYCOLOG-II) ointment, Apply topically 2 (two) times a day (Patient not taking: Reported on 2/19/2023), Disp: 30 g, Rfl: 1  •  pantoprazole (PROTONIX) 40 mg tablet, Take 1 tablet (40 mg total) by mouth 2 (two) times a day before meals (Patient not taking: Reported on 2/19/2023), Disp: 180 tablet, Rfl: 1  •  pregabalin (LYRICA) 100 mg capsule, Take 1 capsule (100 mg total) by mouth 3 (three) times a day (Patient not taking: Reported on 2/19/2023), Disp: 90 capsule, Rfl: 1  •  promethazine (PHENERGAN) 25 mg tablet, Take 1 tablet (25 mg total) by mouth every 6 (six) hours as needed for nausea or vomiting (Patient not taking: Reported on 2/19/2023), Disp: 60 tablet, Rfl: 3  •  Restasis 0 05 % ophthalmic emulsion, , Disp: , Rfl:   •  scopolamine (TRANSDERM-SCOP) 1 5 mg/3 days TD 72 hr patch, Place 1 patch on the skin every third day (Patient not taking: Reported on 2/19/2023), Disp: 10 patch, Rfl: 0  •  Symbicort 80-4 5 MCG/ACT inhaler, INHALE 2 PUFFS BY MOUTH TWICE A DAY RINSE MOUTH AFTER USE (Patient not taking: Reported on 2/19/2023), Disp: 10 2 g, Rfl: 2  •  TiZANidine (ZANAFLEX) 4 MG capsule, TAKE ONE CAPSULE BY MOUTH THREE TIMES A DAY (Patient not taking: Reported on 2/19/2023), Disp: 90 capsule, Rfl: 1    Allergies   Allergen Reactions   • Prozac [Fluoxetine Hcl]      SI   • Bactrim [Sulfamethoxazole-Trimethoprim]      Pt "They think that is what cause the pancreatitis"    • Flagyl [Metronidazole] Diarrhea and Abdominal Pain   • Lamictal [Lamotrigine] GI Intolerance   • Lithium Other (See Comments)   • Ibuprofen    • Lexapro [Escitalopram Oxalate] Rash   • Navane [Thiothixene]      SI   • Other      "novaine?" antipsychotic            Vitals:    02/27/23 1701 BP: 110/73   Pulse: 59       Objective:                    Left Hand Exam     Tenderness   The patient is experiencing tenderness in the ulnar area  Range of Motion   Wrist   Extension: 30   Flexion: 30   Pronation: normal   Supination: normal     Muscle Strength   Wrist extension: 5/5   Wrist flexion: 5/5     Other   Erythema: absent  Sensation: normal  Pulse: present    Comments:  + pain extension and ulnar deviation   + Tenderness over the ECU            Diagnostics, reviewed and taken today if performed as documented: The attending physician has personally reviewed the pertinent films in PACS and interpretation is as follows:  Xrays of the left wrist 3 views:  Hardware is intact with no signs of failure or loosening    Procedures, if performed today:    Procedures    None performed      Portions of the record may have been created with voice recognition software  Occasional wrong word or "sound a like" substitutions may have occurred due to the inherent limitations of voice recognition software  Read the chart carefully and recognize, using context, where substitutions have occurred

## 2023-02-28 ENCOUNTER — TELEPHONE (OUTPATIENT)
Dept: NEPHROLOGY | Facility: CLINIC | Age: 53
End: 2023-02-28

## 2023-02-28 DIAGNOSIS — F31.9 BIPOLAR 1 DISORDER (HCC): ICD-10-CM

## 2023-02-28 RX ORDER — LIDOCAINE 40 MG/G
CREAM TOPICAL AS NEEDED
Qty: 45 G | Refills: 2 | Status: SHIPPED | OUTPATIENT
Start: 2023-02-28

## 2023-03-01 ENCOUNTER — TELEPHONE (OUTPATIENT)
Dept: FAMILY MEDICINE CLINIC | Facility: CLINIC | Age: 53
End: 2023-03-01

## 2023-03-01 NOTE — TELEPHONE ENCOUNTER
Hi, my name is Glo Chapman Dr and I'm a  with [employer] for a mutual consumer  Urbano Amaya date of birth is [birth year]  If PCP can give me a call back from the clinical team to discuss something that Miss Rgean Salgado will need for her VPL repairs, I would greatly appreciate it  My phone number [phone]  Thank you

## 2023-03-02 RX ORDER — DEXTROAMPHETAMINE SACCHARATE, AMPHETAMINE ASPARTATE MONOHYDRATE, DEXTROAMPHETAMINE SULFATE AND AMPHETAMINE SULFATE 5; 5; 5; 5 MG/1; MG/1; MG/1; MG/1
20 CAPSULE, EXTENDED RELEASE ORAL 2 TIMES DAILY
Qty: 60 CAPSULE | Refills: 0 | Status: SHIPPED | OUTPATIENT
Start: 2023-03-02

## 2023-03-07 ENCOUNTER — OFFICE VISIT (OUTPATIENT)
Dept: FAMILY MEDICINE CLINIC | Facility: CLINIC | Age: 53
End: 2023-03-07

## 2023-03-07 VITALS
HEART RATE: 68 BPM | DIASTOLIC BLOOD PRESSURE: 60 MMHG | SYSTOLIC BLOOD PRESSURE: 102 MMHG | TEMPERATURE: 97.5 F | BODY MASS INDEX: 26.7 KG/M2 | RESPIRATION RATE: 17 BRPM | OXYGEN SATURATION: 99 % | WEIGHT: 146 LBS

## 2023-03-07 DIAGNOSIS — G62.9 NEUROPATHY: Primary | Chronic | ICD-10-CM

## 2023-03-07 DIAGNOSIS — R29.898 WEAKNESS OF BOTH LOWER EXTREMITIES: ICD-10-CM

## 2023-03-07 DIAGNOSIS — R20.2 TINGLING SENSATION: ICD-10-CM

## 2023-03-07 NOTE — PROGRESS NOTES
Assessment/Plan:    Neuropathy/ Tingling sensation in head   - Reviewed CT head without contrast (7/26/22) which revealed no acute intracranial abnormality    - Patient unable to undergo MRI due to presence of gastric stimulator    - Patient had previously tried gabapentin, but it caused restless legs syndrome  Patient recently tried Cymbalta, but it caused side effect of dizziness, abdominal pain, vomiting   - Patient is now prescribed Lyrica 100 mg, three times daily, however, patient has now stopped taking it due to low blood pressure readings    -Patient recently started on nortriptyline 10 mg daily at bedtime by pain management, however patient has not been taking the medication every day  Highly recommend patient to start taking daily to reap the full benefit of the medication    - Patient is scheduled for updated EMG studies of bilateral upper and lower extremities later this year  - Patient is also scheduled for C7-T1 interlaminar epidural steroid injection on 3/22/23 to address increased neck and upper extremity radicular component of her pain pattern  This injection was previously beneficial for patient for the pain that she is experiencing now  - Explained to patient it is possible this can also help with the tingling sensation of her head  - Patient is due for neurology follow-up, last seen in April 2021  Updated referral placed previously  Advised to call to schedule an appointment  Diagnoses and all orders for this visit:    Neuropathy  -     Ambulatory Referral to Neurology; Future    Weakness of both lower extremities  -     Ambulatory Referral to Neurology; Future    Tingling sensation        All of patients questions were answered  Patient understands and agrees with the above plan  Return for Next scheduled follow up as scheduled on 4/11/23      Victor Manuel Coles PA-C  03/07/23  [address] [facility] [place]          Subjective:     Patient ID: Timi Griffith  is a 46 y o  female with known PMH of gastroparesis, GERD, constipation, ADHD, type 2 diabetes mellitus, cataplexy, Wegener's granulomatosis, small fiber neuropathy, bipolar 1 disorder who presents today in office for tingling sensation in head  - Patient is a 46 y o  female who presents today for tingling sensation in head  Patient notes she has been experiencing " tingling sensation" in her head for a long time now, but recently it has become more frequent  Patient notes it mainly occurs in the left posterior area of her head, but occasionally on the right posterior area  Patient describes the pain as " electric shock" that comes at least once a day, lasting for few seconds at a time  Patient denies any trauma or injury to the area  Patient notes laughing tends to make the pain worse  Patient notes she is worried that the sensations will become worse  -Patient notes she did have recent follow-up with her eye doctor and the pressure of her eyes was normal     - Patient notes she did recent purchase a TENS unit and is going to start using that  Patient notes she has also started the method of "cupping"  Patient notes she did recently undergo trigger point injections of her lower back and they have been helpful  Patient is also now going to be scheduled for C7-T1 interlaminar epidural steroid injection to address increased neck and upper extremity radicular component of her pain pattern  This injection was previously beneficial for patient for the pain that she is experiencing now  The following portions of the patient's history were reviewed and updated as appropriate: allergies, current medications, past family history, past medical history, past social history, past surgical history and problem list         Review of Systems   Constitutional: Negative for appetite change, fatigue and fever  HENT: Negative for congestion and sore throat  Eyes: Negative for pain     Respiratory: Negative for cough, chest tightness and shortness of breath  Cardiovascular: Negative for chest pain and palpitations  Gastrointestinal: Negative for abdominal pain, constipation, diarrhea, nausea and vomiting  Genitourinary: Negative for difficulty urinating and dysuria  Musculoskeletal: Positive for arthralgias, back pain, myalgias and neck pain  Skin: Negative for rash  Neurological: Positive for weakness and numbness  Negative for dizziness  Tingling "electric" sensation intermittently of head   Psychiatric/Behavioral: The patient is not nervous/anxious  Objective:   Vitals:    03/07/23 1323   BP: 102/60   BP Location: Left arm   Patient Position: Sitting   Cuff Size: Standard   Pulse: 68   Resp: 17   Temp: 97 5 °F (36 4 °C)   TempSrc: Temporal   SpO2: 99%   Weight: 66 2 kg (146 lb)         Physical Exam  Vitals and nursing note reviewed  Constitutional:       General: She is not in acute distress  Appearance: She is well-developed  Comments: Examined in wheelchair   HENT:      Head: Normocephalic and atraumatic  Right Ear: External ear normal       Left Ear: External ear normal       Nose: Nose normal    Eyes:      Conjunctiva/sclera: Conjunctivae normal    Cardiovascular:      Rate and Rhythm: Normal rate and regular rhythm  Pulses: Normal pulses  Heart sounds: Normal heart sounds  Pulmonary:      Effort: Pulmonary effort is normal  No respiratory distress  Breath sounds: Normal breath sounds  No wheezing  Musculoskeletal:      Cervical back: Normal range of motion and neck supple  Skin:     General: Skin is warm and dry  Neurological:      Mental Status: She is alert and oriented to person, place, and time     Psychiatric:         Behavior: Behavior normal

## 2023-03-09 ENCOUNTER — TELEPHONE (OUTPATIENT)
Dept: FAMILY MEDICINE CLINIC | Facility: CLINIC | Age: 53
End: 2023-03-09

## 2023-03-09 DIAGNOSIS — F90.9 ATTENTION DEFICIT HYPERACTIVITY DISORDER (ADHD), UNSPECIFIED ADHD TYPE: Primary | Chronic | ICD-10-CM

## 2023-03-09 RX ORDER — DEXTROAMPHETAMINE SACCHARATE, AMPHETAMINE ASPARTATE, DEXTROAMPHETAMINE SULFATE AND AMPHETAMINE SULFATE 5; 5; 5; 5 MG/1; MG/1; MG/1; MG/1
20 TABLET ORAL 2 TIMES DAILY
Qty: 60 TABLET | Refills: 0 | Status: SHIPPED | OUTPATIENT
Start: 2023-03-09

## 2023-03-09 NOTE — TELEPHONE ENCOUNTER
Pt called in states her adderall 20mg ER is currently on back order but they have the immediate release if you can send in a new prescription states she is out of her medication please advise thank you

## 2023-03-10 ENCOUNTER — TELEPHONE (OUTPATIENT)
Dept: RADIOLOGY | Facility: MEDICAL CENTER | Age: 53
End: 2023-03-10

## 2023-03-10 NOTE — TELEPHONE ENCOUNTER
Patient scheduled for C7-T1 YANNI on 3/22  Auth submitted with clinicals to [facility]  Being denied stating: Your doctor provided the following clinical information: you have neck and arm pain  Based on this information, the request cannot be approved  - Your medical records show that you do not have pain that is bad enough to affect the things you do each day (quality of life or function)  You also do not have neck exercises or stretches (an active rehabilitation program, home exercise program, or functional restoration program) since the last shot (injection)  - Your doctor can give a medical reason you can't exercise  If you cannot exercise due to pain, tell us when you last tried  You also do not have at least 50% less pain for at least three months after your last shot (epidural steroid injection), or how the shot helped you do things that were too painful before  Per last office note (2/23/2022): Previous YANNI on 12/13/2022 "offered 70% relief until recently "  This is not enough documented relief  Patient must have documented 3 months of at least 50% relief prior to scheduling repeat injections  No PT or exercises documented  Please advise

## 2023-03-10 NOTE — ASSESSMENT & PLAN NOTE
- Patient had previously tried gabapentin, but it caused restless legs syndrome  Patient recently tried Cymbalta, but it caused side effect of dizziness, abdominal pain, vomiting   - Patient is now prescribed Lyrica 100 mg, three times daily, however, patient has now stopped taking it due to low blood pressure readings    -Patient recently started on nortriptyline 10 mg daily at bedtime by pain management, however patient has not been taking the medication every day  Highly recommend patient to start taking daily to reap the full benefit of the medication    - Patient is scheduled for updated EMG studies of bilateral upper and lower extremities later this year  - Patient is also scheduled for C7-T1 interlaminar epidural steroid injection on 3/22/23 to address increased neck and upper extremity radicular component of her pain pattern  This injection was previously beneficial for patient for the pain that she is experiencing now  - Explained to patient it is possible this can also help with the tingling sensation of her head  - Patient is due for neurology follow-up, last seen in April 2021  Updated referral placed previously  Advised to call to schedule an appointment

## 2023-03-14 DIAGNOSIS — G89.4 CHRONIC PAIN SYNDROME: ICD-10-CM

## 2023-03-14 DIAGNOSIS — M31.31 GRANULOMATOSIS WITH POLYANGIITIS WITH RENAL INVOLVEMENT (HCC): ICD-10-CM

## 2023-03-17 NOTE — TELEPHONE ENCOUNTER
[id number] [address] Ken   Patient can come in for another office visit to re-evaluate if she would like

## 2023-03-20 RX ORDER — OXYCODONE HYDROCHLORIDE 15 MG/1
15 TABLET ORAL EVERY 4 HOURS PRN
Qty: 180 TABLET | Refills: 0 | Status: SHIPPED | OUTPATIENT
Start: 2023-03-20

## 2023-03-20 NOTE — TELEPHONE ENCOUNTER
Spoke with patient and reviewed denial  She did not have at least 3 months of at least 50% relief after her injection ion 12/13  F/u schedule with MG on 3/27; will cx injection

## 2023-03-20 NOTE — TELEPHONE ENCOUNTER
Caller: patient     Doctor: Elisa Mendoza    Reason for call: transferred call to procedure     Call back#: n/a

## 2023-03-27 ENCOUNTER — OFFICE VISIT (OUTPATIENT)
Dept: PAIN MEDICINE | Facility: MEDICAL CENTER | Age: 53
End: 2023-03-27

## 2023-03-27 VITALS
HEART RATE: 52 BPM | HEIGHT: 62 IN | WEIGHT: 146 LBS | SYSTOLIC BLOOD PRESSURE: 113 MMHG | DIASTOLIC BLOOD PRESSURE: 72 MMHG | BODY MASS INDEX: 26.87 KG/M2

## 2023-03-27 DIAGNOSIS — M54.2 NECK PAIN: ICD-10-CM

## 2023-03-27 DIAGNOSIS — G89.4 CHRONIC PAIN SYNDROME: ICD-10-CM

## 2023-03-27 DIAGNOSIS — M50.30 DDD (DEGENERATIVE DISC DISEASE), CERVICAL: ICD-10-CM

## 2023-03-27 DIAGNOSIS — M54.12 CERVICAL RADICULOPATHY: Primary | ICD-10-CM

## 2023-03-27 NOTE — PATIENT INSTRUCTIONS
Epidural Steroid Injection   WHAT YOU NEED TO KNOW:   What do I need to know about an epidural steroid injection (DUC)? An DUC is a procedure to inject steroid medicine into the epidural space  The epidural space is between your spinal cord and vertebrae  Steroids reduce inflammation and fluid buildup in your spine that may be causing pain  You may be given pain medicine along with the steroids  How do I prepare for an DUC? Your provider will talk to you about how to prepare for your procedure  He or she will tell you what medicines to take or not take on the day of your procedure  You may need to stop taking blood thinners or other medicines several days before your procedure  You may need to adjust any diabetes medicine you take on the day of your procedure  Steroid medicine can increase your blood sugar level  Arrange for someone to drive you home when you are discharged  What will happen during an DUC? You will be given medicine to numb the procedure area  You will be awake for the procedure, but you will not feel pain  You may also be given medicine to help you relax  Contrast liquid will be used to help your provider see the area better  Tell the provider if you have ever had an allergic reaction to contrast liquid  Your provider may place the needle into your neck area, middle of your back, or tailbone area  He or she may inject the medicine next to the nerves that are causing your pain  He or she may instead inject the medicine into a larger area of the epidural space  This helps the medicine spread to more nerves  Your provider will use a fluoroscope to help guide the needle to the right place  A fluoroscope is a type of x-ray  After the procedure, a bandage will be placed over the injection site to prevent infection  What will happen after an DUC? You will have a bandage over the injection site to prevent infection   Your provider will tell you when you can bathe and any activity guidelines  You will be able to go home  What are the risks of an DUC? You may have temporary or permanent nerve damage or paralysis  You may have bleeding or develop a serious infection, such as meningitis (swelling of the brain coverings)  An abscess may also develop  An abscess is a pus-filled area under the skin  You may need surgery to fix the abscess  You may have a seizure, anxiety, or trouble sleeping  If you are a man, you may have temporary erectile dysfunction (not able to have an erection)  CARE AGREEMENT:   You have the right to help plan your care  Learn about your health condition and how it may be treated  Discuss treatment options with your healthcare providers to decide what care you want to receive  You always have the right to refuse treatment  The above information is an  only  It is not intended as medical advice for individual conditions or treatments  Talk to your doctor, nurse or pharmacist before following any medical regimen to see if it is safe and effective for you  © Copyright [employer] 2022 Information is for End User's use only and may not be sold, redistributed or otherwise used for commercial purposes

## 2023-03-27 NOTE — PROGRESS NOTES
Assessment:  1  Cervical radiculopathy    2  DDD (degenerative disc disease), cervical    3  Neck pain    4  Chronic pain syndrome        Plan:  While the patient was in the office today, I did have a thorough conversation regarding their chronic pain syndrome, medication management, and treatment plan options  After discussing options, I have recommended the patient proceed with a C7-T1 interlaminar epidural steroid injection to address the neck and upper extremity radicular symptoms  She has reported greater than 50% reduction of the same pain pattern with the last epidural steroid injection  She reports that the relief lasted for 3 months followed by a gradual return of the same pain pattern  Complete risks and benefits including bleeding, infection, tissue reaction, nerve injury and allergic reaction were discussed  The approach was demonstrated using models and literature was provided  Verbal and written consent was obtained  My impressions and treatment recommendations were discussed in detail with the patient who verbalized understanding and had no further questions  Discharge instructions were provided  I personally saw and examined the patient and I agree with the above discussed plan of care  No orders of the defined types were placed in this encounter  No orders of the defined types were placed in this encounter  History of Present Illness:  Velasquez Hale is a 46 y o  female who presents for a follow up office visit in regards to neck and upper extremity pain  The patient’s current symptoms include radicular neck pain that she presently rates a 9 out of 10 on the pain scale describes it as a constant sharp, throbbing, pressure-like and shooting pain with numbness and paresthesias in bilateral upper extremities  She reports increasing weakness of her upper extremities as well    December 13, 2022 she underwent a C7-T1 interlaminar epidural steroid injection and upon questioning she reports greater than 50% reduction of pain that lasted for 3 months followed by a gradual increase in the same pain pattern  During that period of relief she states that she was able to perform ADLs with less pain and more ease  I have personally reviewed and/or updated the patient's past medical history, past surgical history, family history, social history, current medications, allergies, and vital signs today  Review of Systems   Constitutional: Negative for unexpected weight change  Eyes: Negative for visual disturbance  Respiratory: Negative for shortness of breath  Cardiovascular: Positive for chest pain  Negative for palpitations  Gastrointestinal: Positive for constipation  Endocrine: Negative for polyphagia  Genitourinary: Negative for frequency  Musculoskeletal: Positive for arthralgias, back pain, gait problem, joint swelling and myalgias  Skin: Negative for rash  Neurological: Positive for weakness, numbness and headaches  Hematological: Does not bruise/bleed easily  Psychiatric/Behavioral: Positive for sleep disturbance  The patient is not nervous/anxious          Patient Active Problem List   Diagnosis   • Type 2 diabetes mellitus with stage 3 chronic kidney disease, with long-term current use of insulin ([facility] )   • Dyslipidemia   • Granulomatosis with polyangiitis (HCC)   • MPA (microscopic polyangiitis) (HCC)   • Asthma   • Benign essential HTN   • Chronic right shoulder pain   • Noncompliance   • Bipolar 1 disorder (HCC)   • Epigastric pain   • Pancreatitis   • Ovarian cyst   • Postherpetic neuralgia   • Anemia of chronic disease   • Arthralgia of multiple joints   • Cataplexy   • Weakness of both lower extremities   • Chronic pelvic pain in female   • Plantar wart, right foot   • Seasonal allergic rhinitis due to pollen   • Gastroesophageal reflux disease   • Weakness of both upper extremities   • Persistent proteinuria   • Left leg pain   • Controlled substance agreement signed   • Chronic narcotic use   • Small fiber neuropathy   • IBS (irritable bowel syndrome)   • Leukocytosis   • Gastroparesis   • Hyperosmia   • Smell disturbance   • Contusion of left hip   • Trochanteric bursitis of left hip   • Neuropathy   • Attention deficit hyperactivity disorder (ADHD)   • Elevated blood pressure reading   • Costochondritis   • Hearing difficulty of both ears   • Vaginal atrophy   • Alpha-hemolytic Streptococcus positive urine culture   • Schizo-affective schizophrenia ([facility])   • 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[facility])   • Cervical radiculopathy   • Finger numbness   • Fall at home, initial encounter   • Bradycardia   • Marijuana use   • Difficulty ventilating with mask   • Chronic bilateral low back pain without sciatica   • Lump of skin of back   • Cervical disc disorder with radiculopathy of mid-cervical region   • Neck pain   • Cervical spinal stenosis   • Depression   • Continuous opioid dependence ([facility])   • Chronic kidney disease, stage 4 (severe) ([facility])   • Chronic pain   • COVID   • PTSD (post-traumatic stress disorder)   • Pain of finger of right hand   • Bilateral sacroiliitis ([facility])   • Myofascial pain syndrome   • Lumbar spondylosis   • Chronic right hip pain   • Vomiting   • Osteopenia   • Pain in finger of left hand   • Intrinsic urethral sphincter deficiency   • Urinary incontinence, mixed   • Other constipation   • Polyarthritis   • Other secondary hypertension   • Left wrist pain       Past Medical History:   Diagnosis Date   • ADHD    • Anemia of chronic disease    • Anxiety    • Arthritis ? • Asthma    • Bipolar disorder ([facility] )    • Borderline personality disorder ([facility] )    • Cataplexy    • Chronic abdominal pain    • Chronic kidney disease ? • CKD (chronic kidney disease) stage 3, GFR 30-59 ml/min ([facility])    • Cushing syndrome ([facility])    • Depression ?    • Diabetes mellitus ([facility] )    • DVT (deep venous thrombosis) ([facility])    • GERD (gastroesophageal reflux disease)    • Headache(784 0) 3 months   • History of acute pancreatitis     felt secondary to Bactrim   • History of transfusion    • Hypertension    • Liver disease     fatty liver   • Microscopic polyangiitis (HCC)    • Ovarian cyst    • PTSD (post-traumatic stress disorder)    • Self-inflicted injury     self inflicted skin wounds   • Sleep apnea    • Wegener's granulomatosis with renal involvement ([facility] )        Past Surgical History:   Procedure Laterality Date   • ESOPHAGOGASTRODUODENOSCOPY  2015    mild antral gastritis   • GASTRIC STIMULATOR IMPLANT SURGERY  2020   • GA COLONOSCOPY FLX DX W/COLLJ SPEC WHEN PFRMD N/A 2018    adenoma removed from the transverse, hyperplastic polyp removed from the left colon   • GA ESOPHAGOGASTRODUODENOSCOPY TRANSORAL DIAGNOSTIC N/A 2018    gastritis and scant coffee-ground material   Biopsies negative for H  pylori   • GA OPEN TX RADIAL&ULNAR SHAFT FX W/FIXJ RADIUS&ULNA Left 2022    Procedure: OPEN REDUCTION W/ INTERNAL FIXATION (ORIF) RADIUS / ULNA (WRIST); Surgeon: Denia Velasco MD;  Location: [facility];  Service: Orthopedics   • RELEASE SCAR CONTRACTURE / GRAFT REPAIRS OF HAND Bilateral    • UPPER GASTROINTESTINAL ENDOSCOPY  2019    Dr Stoddard Revel   Botox to the pylorus       Family History   Problem Relation Age of Onset   • Arthritis Mother    • Depression Mother    • Diabetes Mother    • Mental illness Mother    • Migraines Mother    • No Known Problems Father    • Colon cancer Neg Hx    • Drug abuse Neg Hx         mother father   • Mental illness Neg Hx         disorder, mother father   • Cancer Neg Hx    • Breast cancer Neg Hx        Social History     Occupational History   • Occupation: disability   Tobacco Use   • Smoking status: Former     Packs/day:      Years: 10 00     Pack years: 10 00     Types: Cigarettes     Quit date: 2011     Years since quittin 2   • Smokeless tobacco: Never   • Tobacco comments:     Stopped smoking 11 years ago   [de-identified] Use   • Vaping Use: Never used   Substance and Sexual Activity   • Alcohol use: Never   • Drug use: Yes     Types: Marijuana     Comment: marijuana daily   • Sexual activity: Not Currently     Partners: Male     Birth control/protection: None       Current Outpatient Medications on File Prior to Visit   Medication Sig   • Alcohol Swabs (Alcohol Pads) 70 % PADS Use 4 (four) times a day   • amphetamine-dextroamphetamine (ADDERALL XR) 20 MG 24 hr capsule Take 1 capsule (20 mg total) by mouth 2 (two) times a day Max Daily Amount: 40 mg   • amphetamine-dextroamphetamine (ADDERALL, 20MG,) 20 mg tablet Take 1 tablet (20 mg total) by mouth 2 (two) times a day Max Daily Amount: 40 mg   • Blood Glucose Monitoring Suppl (FreeStyle Lite) DEIRDRE Use daily   • Blood Pressure Monitor KIT Use daily to check blood pressure     • clobetasol (TEMOVATE) 0 05 % cream APPLY TOPICALLY TO AFFECTED AREA TWICE A DAY   • Comfort Touch Insulin Pen Need 33G X 6 MM MISC USE TO INJECT INSULIN FOUR TIMES A DAY ( WITH MEALS AND AT BEDTIME )   • Diclofenac Sodium (VOLTAREN) 1 % APPLY TWO GRAMS TOPICALLY FOUR TIMES A DAY   • Easy Comfort Lancets MISC USE TO TEST THE BLOOD SUGAR THREE TIMES A DAY   • glucose blood (FREESTYLE LITE) test strip USE TO TEST THE BLOOD SUGAR THREE TIMES A DAY AS DIRECTED   • guaiFENesin (ROBITUSSIN) 100 MG/5ML oral liquid Take 10 mL (200 mg total) by mouth 3 (three) times a day as needed for cough or congestion   • haloperidol (HALDOL) 5 mg tablet TAKE ONE TABLET BY MOUTH ONCE DAILY AS NEEDED FOR ABDOMINAL PAIN / NAUSEA   • lidocaine (LMX) 4 % cream Apply topically as needed for mild pain   • lidocaine (XYLOCAINE) 5 % ointment APPLY TOPICALLY 2GM TWICE A DAY TO AFFECTED AREAS AS NEEDED FOR MILD PAIN (Patient taking differently: Lidocaine patches)   • Linzess 72 MCG CAPS TAKE ONE CAPSULE BY MOUTH ONCE DAILY   • naloxone (Narcan) 4 mg/0 1 mL nasal spray ADMINISTER 1 SPRAY IN ONE NOSTRIL IF NO REPONSE AFTER 2-3 MINUTES SPRAY INTO OTHER NOSTRIL WITH NEW spray   • NON FORMULARY [facility]   • ondansetron (Zofran ODT) 8 mg disintegrating tablet Take 1 tablet (8 mg total) by mouth every 8 (eight) hours as needed for nausea or vomiting   • oxyCODONE (Roxicodone) 15 mg immediate release tablet Take 1 tablet (15 mg total) by mouth every 4 (four) hours as needed for moderate pain Max Daily Amount: 90 mg   • acetaminophen (TYLENOL) 500 mg tablet Take 2 tablets (1,000 mg total) by mouth every 8 (eight) hours as needed for mild pain (Patient not taking: Reported on 2/19/2023)   • al mag oxide-diphenhydramine-lidocaine viscous (MAGIC MOUTHWASH) 1:1:1 suspension Swish and spit 10 mL every 4 (four) hours as needed for mouth pain or discomfort (Patient not taking: Reported on 2/19/2023)   • albuterol (2 5 mg/3 mL) 0 083 % nebulizer solution USE 1 VIAL VIA NEBULIZER EVERY 6 HOURS AS NEEDED FOR WHEEZING OR SHORTNESS OF BREATH (Patient not taking: Reported on 2/19/2023)   • albuterol (PROVENTIL HFA,VENTOLIN HFA) 90 mcg/act inhaler INHALE 2 PUFFS BY MOUTH EVERY 6 HOURS AS NEEDED FOR WHEEZING OR SHORTNESS OF BREATH (Patient not taking: Reported on 2/19/2023)   • buPROPion (WELLBUTRIN XL) 300 mg 24 hr tablet Take 1 tablet (300 mg total) by mouth every morning (Patient not taking: Reported on 2/19/2023)   • Calcium 600 1500 (600 Ca) MG TABS  (Patient not taking: Reported on 2/19/2023)   • calcium carbonate (OS-ALISIA) 600 MG tablet Take 1 tablet (600 mg total) by mouth daily (Patient not taking: Reported on 2/19/2023)   • Cholecalciferol (Vitamin D) 50 MCG (2000 UT) tablet Take 1 tablet (2,000 Units total) by mouth daily (Patient not taking: Reported on 2/19/2023)   • dicyclomine (BENTYL) 20 mg tablet Take 1 tablet (20 mg total) by mouth in the morning and 1 tablet (20 mg total) in the evening  Do all this for 7 days     • hydrocortisone 2 5 % cream Apply topically 2 (two) times a day as needed for rash (Patient not taking: Reported on 2/19/2023)   • insulin "glargine (Toujeo Max SoloStar) 300 units/mL CONCENTRATED U-300 injection pen (2-unit dial) Inject 22 Units under the skin daily (Patient not taking: Reported on 2/19/2023)   • methylPREDNISolone 4 MG tablet therapy pack Use as directed on package (Patient not taking: Reported on 3/27/2023)   • nortriptyline (PAMELOR) 10 mg capsule Take 1 capsule (10 mg total) by mouth daily at bedtime (Patient not taking: Reported on 2/19/2023)   • NovoLOG FlexPen 100 units/mL injection pen INJECT 6 UNITS UNDER THE SKIN THREE TIMES A DAY WITH MEALS (Patient not taking: Reported on 2/19/2023)   • nystatin-triamcinolone (MYCOLOG-II) ointment Apply topically 2 (two) times a day (Patient not taking: Reported on 2/19/2023)   • pantoprazole (PROTONIX) 40 mg tablet Take 1 tablet (40 mg total) by mouth 2 (two) times a day before meals (Patient not taking: Reported on 2/19/2023)   • pregabalin (LYRICA) 100 mg capsule Take 1 capsule (100 mg total) by mouth 3 (three) times a day (Patient not taking: Reported on 2/19/2023)   • promethazine (PHENERGAN) 25 mg tablet Take 1 tablet (25 mg total) by mouth every 6 (six) hours as needed for nausea or vomiting (Patient not taking: Reported on 2/19/2023)   • Restasis 0 05 % ophthalmic emulsion  (Patient not taking: Reported on 2/19/2023)   • scopolamine (TRANSDERM-SCOP) 1 5 mg/3 days TD 72 hr patch Place 1 patch on the skin every third day (Patient not taking: Reported on 2/19/2023)   • Symbicort 80-4 5 MCG/ACT inhaler INHALE 2 PUFFS BY MOUTH TWICE A DAY RINSE MOUTH AFTER USE (Patient not taking: Reported on 2/19/2023)   • TiZANidine (ZANAFLEX) 4 MG capsule TAKE ONE CAPSULE BY MOUTH THREE TIMES A DAY (Patient not taking: Reported on 2/19/2023)     No current facility-administered medications on file prior to visit         Allergies   Allergen Reactions   • Prozac [Fluoxetine Hcl]      SI   • Bactrim [Sulfamethoxazole-Trimethoprim]      Pt \"They think that is what cause the pancreatitis\"    • Flagyl " "[Metronidazole] Diarrhea and Abdominal Pain   • Lamictal [Lamotrigine] GI Intolerance   • Lithium Other (See Comments)   • Ibuprofen    • Lexapro [Escitalopram Oxalate] Rash   • Navane [Thiothixene]      SI   • Other      \"novaine? \" antipsychotic       Physical Exam:    /72   Pulse (!) 52   Ht 5' 2\" (1 575 m)   Wt 66 2 kg (146 lb)   LMP  (LMP Unknown)   BMI 26 70 kg/m²     Constitutional:normal, well developed, well nourished, alert, in no distress and non-toxic and no overt pain behavior  Eyes:anicteric  HEENT:grossly intact  Neck:supple, symmetric, trachea midline and no masses   Pulmonary:even and unlabored  Cardiovascular:No edema or pitting edema present  Skin:Normal without rashes or lesions and well hydrated  Psychiatric:Mood and affect appropriate  Neurologic:Cranial Nerves II-XII grossly intact  Musculoskeletal: Tender to palpation over the bilateral paraspinal cervical muscles, limited range of motion, positive Spurling's bilaterally  Patient in wheelchair      Imaging    "

## 2023-03-28 ENCOUNTER — TELEPHONE (OUTPATIENT)
Dept: FAMILY MEDICINE CLINIC | Facility: CLINIC | Age: 53
End: 2023-03-28

## 2023-03-28 ENCOUNTER — HOSPITAL ENCOUNTER (EMERGENCY)
Facility: HOSPITAL | Age: 53
Discharge: HOME | End: 2023-03-28
Attending: EMERGENCY MEDICINE

## 2023-03-28 ENCOUNTER — APPOINTMENT (EMERGENCY)
Dept: CT IMAGING | Facility: HOSPITAL | Age: 53
End: 2023-03-28

## 2023-03-28 VITALS
DIASTOLIC BLOOD PRESSURE: 56 MMHG | OXYGEN SATURATION: 98 % | HEART RATE: 55 BPM | BODY MASS INDEX: 26.7 KG/M2 | SYSTOLIC BLOOD PRESSURE: 115 MMHG | WEIGHT: 146 LBS | TEMPERATURE: 98.3 F | RESPIRATION RATE: 18 BRPM

## 2023-03-28 DIAGNOSIS — R10.9 FLANK PAIN: Primary | ICD-10-CM

## 2023-03-28 LAB
ALBUMIN SERPL BCP-MCNC: 4.2 G/DL (ref 3.5–5)
ALP SERPL-CCNC: 96 U/L (ref 34–104)
ALT SERPL W P-5'-P-CCNC: 12 U/L (ref 7–52)
ANION GAP SERPL CALCULATED.3IONS-SCNC: 5 MMOL/L (ref 4–13)
AST SERPL W P-5'-P-CCNC: 16 U/L (ref 13–39)
BACTERIA UR QL AUTO: ABNORMAL /HPF
BASOPHILS # BLD AUTO: 0.02 THOUSANDS/ÂΜL (ref 0–0.1)
BASOPHILS NFR BLD AUTO: 0 % (ref 0–1)
BILIRUB SERPL-MCNC: 0.26 MG/DL (ref 0.2–1)
BILIRUB UR QL STRIP: NEGATIVE
BUN SERPL-MCNC: 35 MG/DL (ref 5–25)
CALCIUM SERPL-MCNC: 9.3 MG/DL (ref 8.4–10.2)
CHLORIDE SERPL-SCNC: 110 MMOL/L (ref 96–108)
CLARITY UR: CLEAR
CO2 SERPL-SCNC: 26 MMOL/L (ref 21–32)
COLOR UR: YELLOW
CREAT SERPL-MCNC: 1.95 MG/DL (ref 0.6–1.3)
EOSINOPHIL # BLD AUTO: 0.03 THOUSAND/ÂΜL (ref 0–0.61)
EOSINOPHIL NFR BLD AUTO: 1 % (ref 0–6)
ERYTHROCYTE [DISTWIDTH] IN BLOOD BY AUTOMATED COUNT: 13.2 % (ref 11.6–15.1)
GFR SERPL CREATININE-BSD FRML MDRD: 28 ML/MIN/1.73SQ M
GLUCOSE SERPL-MCNC: 87 MG/DL (ref 65–140)
GLUCOSE UR STRIP-MCNC: NEGATIVE MG/DL
HCT VFR BLD AUTO: 40 % (ref 34.8–46.1)
HGB BLD-MCNC: 13.3 G/DL (ref 11.5–15.4)
HGB UR QL STRIP.AUTO: NEGATIVE
IMM GRANULOCYTES # BLD AUTO: 0.02 THOUSAND/UL (ref 0–0.2)
IMM GRANULOCYTES NFR BLD AUTO: 0 % (ref 0–2)
KETONES UR STRIP-MCNC: NEGATIVE MG/DL
LEUKOCYTE ESTERASE UR QL STRIP: ABNORMAL
LIPASE SERPL-CCNC: 100 U/L (ref 11–82)
LYMPHOCYTES # BLD AUTO: 1.62 THOUSANDS/ÂΜL (ref 0.6–4.47)
LYMPHOCYTES NFR BLD AUTO: 28 % (ref 14–44)
MCH RBC QN AUTO: 31.4 PG (ref 26.8–34.3)
MCHC RBC AUTO-ENTMCNC: 33.3 G/DL (ref 31.4–37.4)
MCV RBC AUTO: 95 FL (ref 82–98)
MONOCYTES # BLD AUTO: 0.43 THOUSAND/ÂΜL (ref 0.17–1.22)
MONOCYTES NFR BLD AUTO: 7 % (ref 4–12)
NEUTROPHILS # BLD AUTO: 3.67 THOUSANDS/ÂΜL (ref 1.85–7.62)
NEUTS SEG NFR BLD AUTO: 64 % (ref 43–75)
NITRITE UR QL STRIP: NEGATIVE
NON-SQ EPI CELLS URNS QL MICRO: ABNORMAL /HPF
NRBC BLD AUTO-RTO: 0 /100 WBCS
PH UR STRIP.AUTO: 6 [PH] (ref 4.5–8)
PLATELET # BLD AUTO: 218 THOUSANDS/UL (ref 149–390)
PMV BLD AUTO: 10.2 FL (ref 8.9–12.7)
POTASSIUM SERPL-SCNC: 4.5 MMOL/L (ref 3.5–5.3)
PROT SERPL-MCNC: 7.5 G/DL (ref 6.4–8.4)
PROT UR STRIP-MCNC: ABNORMAL MG/DL
RBC # BLD AUTO: 4.23 MILLION/UL (ref 3.81–5.12)
RBC #/AREA URNS AUTO: ABNORMAL /HPF
SODIUM SERPL-SCNC: 141 MMOL/L (ref 135–147)
SP GR UR STRIP.AUTO: 1.02 (ref 1–1.03)
UROBILINOGEN UR QL STRIP.AUTO: 0.2 E.U./DL
WBC # BLD AUTO: 5.79 THOUSAND/UL (ref 4.31–10.16)
WBC #/AREA URNS AUTO: ABNORMAL /HPF

## 2023-03-28 RX ORDER — MORPHINE SULFATE 4 MG/ML
4 INJECTION, SOLUTION INTRAMUSCULAR; INTRAVENOUS ONCE
Status: COMPLETED | OUTPATIENT
Start: 2023-03-28 | End: 2023-03-28

## 2023-03-28 RX ADMIN — MORPHINE SULFATE 4 MG: 4 INJECTION INTRAVENOUS at 10:26

## 2023-03-28 RX ADMIN — SODIUM CHLORIDE 1000 ML: 0.9 INJECTION, SOLUTION INTRAVENOUS at 10:26

## 2023-03-28 NOTE — ED PROVIDER NOTES
History  Chief Complaint   Patient presents with   • Flank Pain     Pt c/o right flank pain for the past x 3 days  Pt has a Hx of renal disease  Pt c/o nausea but denies v/d or fevers     45 y/o female with R sided flank pain for 3 days  She had some difficulty urinating 2 days ago, but now is urinating okay  She has decreased appetite  No fevers, +nausea, no vomiting  Pt  Takes percocet for chronic pain and it's not helping   +constipation, last bm this am    Never had a kidney stone  She has a hx of stage 3 CKD  Prior to Admission Medications   Prescriptions Last Dose Informant Patient Reported? Taking? Alcohol Swabs (Alcohol Pads) 70 % PADS   No Yes   Sig: Use 4 (four) times a day   Blood Glucose Monitoring Suppl (FreeStyle Lite) DEIRDRE   No Yes   Sig: Use daily   Blood Pressure Monitor KIT   No Yes   Sig: Use daily to check blood pressure     Calcium 600 1500 (600 Ca) MG TABS Not Taking  Yes No   Patient not taking: Reported on 2/19/2023   Cholecalciferol (Vitamin D) 50 MCG (2000 UT) tablet Not Taking  No No   Sig: Take 1 tablet (2,000 Units total) by mouth daily   Patient not taking: Reported on 2/19/2023   Comfort Touch Insulin Pen Need 33G X 6 MM MISC   No Yes   Sig: USE TO INJECT INSULIN FOUR TIMES A DAY ( WITH MEALS AND AT BEDTIME )   Diclofenac Sodium (VOLTAREN) 1 %   No Yes   Sig: APPLY TWO GRAMS TOPICALLY FOUR TIMES A DAY   Easy Comfort Lancets MISC   No Yes   Sig: USE TO TEST THE BLOOD SUGAR THREE TIMES A DAY   Linzess 72 MCG CAPS   No No   Sig: TAKE ONE CAPSULE BY MOUTH ONCE DAILY   NON FORMULARY   Yes Yes   Sig: [facility]   NovoLOG FlexPen 100 units/mL injection pen   No Yes   Sig: INJECT 6 UNITS UNDER THE SKIN THREE TIMES A DAY WITH MEALS   Restasis 0 05 % ophthalmic emulsion   Yes No   Patient not taking: Reported on 2/19/2023   Symbicort 80-4 5 MCG/ACT inhaler   No Yes   Sig: INHALE 2 PUFFS BY MOUTH TWICE A DAY RINSE MOUTH AFTER USE   TiZANidine (ZANAFLEX) 4 MG capsule   No No Sig: TAKE ONE CAPSULE BY MOUTH THREE TIMES A DAY   Patient not taking: Reported on 2/19/2023   acetaminophen (TYLENOL) 500 mg tablet   No Yes   Sig: Take 2 tablets (1,000 mg total) by mouth every 8 (eight) hours as needed for mild pain   al mag oxide-diphenhydramine-lidocaine viscous (MAGIC MOUTHWASH) 1:1:1 suspension   No Yes   Sig: Swish and spit 10 mL every 4 (four) hours as needed for mouth pain or discomfort   albuterol (2 5 mg/3 mL) 0 083 % nebulizer solution   No Yes   Sig: USE 1 VIAL VIA NEBULIZER EVERY 6 HOURS AS NEEDED FOR WHEEZING OR SHORTNESS OF BREATH   albuterol (PROVENTIL HFA,VENTOLIN HFA) 90 mcg/act inhaler   No Yes   Sig: INHALE 2 PUFFS BY MOUTH EVERY 6 HOURS AS NEEDED FOR WHEEZING OR SHORTNESS OF BREATH   amphetamine-dextroamphetamine (ADDERALL XR) 20 MG 24 hr capsule   No Yes   Sig: Take 1 capsule (20 mg total) by mouth 2 (two) times a day Max Daily Amount: 40 mg   amphetamine-dextroamphetamine (ADDERALL, 20MG,) 20 mg tablet   No Yes   Sig: Take 1 tablet (20 mg total) by mouth 2 (two) times a day Max Daily Amount: 40 mg   buPROPion (WELLBUTRIN XL) 300 mg 24 hr tablet Not Taking  No No   Sig: Take 1 tablet (300 mg total) by mouth every morning   Patient not taking: Reported on 2/19/2023   calcium carbonate (OS-ALISIA) 600 MG tablet Not Taking  No No   Sig: Take 1 tablet (600 mg total) by mouth daily   Patient not taking: Reported on 2/19/2023   clobetasol (TEMOVATE) 0 05 % cream   No Yes   Sig: APPLY TOPICALLY TO AFFECTED AREA TWICE A DAY   dicyclomine (BENTYL) 20 mg tablet   No Yes   Sig: Take 1 tablet (20 mg total) by mouth in the morning and 1 tablet (20 mg total) in the evening  Do all this for 7 days     glucose blood (FREESTYLE LITE) test strip   No Yes   Sig: USE TO TEST THE BLOOD SUGAR THREE TIMES A DAY AS DIRECTED   guaiFENesin (ROBITUSSIN) 100 MG/5ML oral liquid   No Yes   Sig: Take 10 mL (200 mg total) by mouth 3 (three) times a day as needed for cough or congestion   haloperidol (HALDOL) 5 mg tablet   No Yes   Sig: TAKE ONE TABLET BY MOUTH ONCE DAILY AS NEEDED FOR ABDOMINAL PAIN / NAUSEA   hydrocortisone 2 5 % cream   No Yes   Sig: Apply topically 2 (two) times a day as needed for rash   insulin glargine (Toujeo Max SoloStar) 300 units/mL CONCENTRATED U-300 injection pen (2-unit dial)   No Yes   Sig: Inject 22 Units under the skin daily   lidocaine (LMX) 4 % cream   No Yes   Sig: Apply topically as needed for mild pain   lidocaine (XYLOCAINE) 5 % ointment Not Taking  No No   Sig: APPLY TOPICALLY 2GM TWICE A DAY TO AFFECTED AREAS AS NEEDED FOR MILD PAIN   Patient not taking: Reported on 3/28/2023   methylPREDNISolone 4 MG tablet therapy pack Not Taking  No No   Sig: Use as directed on package   Patient not taking: Reported on 3/27/2023   naloxone (Narcan) 4 mg/0 1 mL nasal spray   No Yes   Sig: ADMINISTER 1 SPRAY IN ONE NOSTRIL IF NO REPONSE AFTER 2-3 MINUTES SPRAY INTO OTHER NOSTRIL WITH NEW spray   nortriptyline (PAMELOR) 10 mg capsule   No No   Sig: Take 1 capsule (10 mg total) by mouth daily at bedtime   Patient not taking: Reported on 2/19/2023   nystatin-triamcinolone (MYCOLOG-II) ointment   No No   Sig: Apply topically 2 (two) times a day   Patient not taking: Reported on 2/19/2023   ondansetron (Zofran ODT) 8 mg disintegrating tablet   No Yes   Sig: Take 1 tablet (8 mg total) by mouth every 8 (eight) hours as needed for nausea or vomiting   oxyCODONE (Roxicodone) 15 mg immediate release tablet   No Yes   Sig: Take 1 tablet (15 mg total) by mouth every 4 (four) hours as needed for moderate pain Max Daily Amount: 90 mg   pantoprazole (PROTONIX) 40 mg tablet   No No   Sig: Take 1 tablet (40 mg total) by mouth 2 (two) times a day before meals   Patient not taking: Reported on 2/19/2023   pregabalin (LYRICA) 100 mg capsule   No No   Sig: Take 1 capsule (100 mg total) by mouth 3 (three) times a day   Patient not taking: Reported on 2/19/2023   promethazine (PHENERGAN) 25 mg tablet   No No   Sig: Take 1 tablet (25 mg total) by mouth every 6 (six) hours as needed for nausea or vomiting   Patient not taking: Reported on 2/19/2023   scopolamine (TRANSDERM-SCOP) 1 5 mg/3 days TD 72 hr patch Not Taking  No No   Sig: Place 1 patch on the skin every third day   Patient not taking: Reported on 2/19/2023      Facility-Administered Medications: None       Past Medical History:   Diagnosis Date   • ADHD    • Anemia of chronic disease    • Anxiety    • Arthritis ? • Asthma    • Bipolar disorder ([place] [address] )    • Borderline personality disorder ([place] [address] )    • Cataplexy    • Chronic abdominal pain    • Chronic kidney disease ? • CKD (chronic kidney disease) stage 3, GFR 30-59 ml/min ([facility])    • Cushing syndrome ([facility])    • Depression ? • Diabetes mellitus ([place] [address] )    • DVT (deep venous thrombosis) ([facility])    • GERD (gastroesophageal reflux disease)    • Headache(784 0) 3 months   • History of acute pancreatitis     felt secondary to Bactrim   • History of transfusion    • Hypertension    • Liver disease     fatty liver   • Microscopic polyangiitis (HCC)    • Ovarian cyst    • PTSD (post-traumatic stress disorder)    • Self-inflicted injury     self inflicted skin wounds   • Sleep apnea    • Wegener's granulomatosis with renal involvement ([place] [address] ) 2015       Past Surgical History:   Procedure Laterality Date   • ESOPHAGOGASTRODUODENOSCOPY  09/11/2015    mild antral gastritis   • GASTRIC STIMULATOR IMPLANT SURGERY  06/25/2020   • ME COLONOSCOPY FLX DX W/COLLJ SPEC WHEN PFRMD N/A 12/14/2018    adenoma removed from the transverse, hyperplastic polyp removed from the left colon   • ME ESOPHAGOGASTRODUODENOSCOPY TRANSORAL DIAGNOSTIC N/A 12/14/2018    gastritis and scant coffee-ground material   Biopsies negative for H  pylori   • ME OPEN TX RADIAL&ULNAR SHAFT FX W/FIXJ RADIUS&ULNA Left 6/23/2022    Procedure: OPEN REDUCTION W/ INTERNAL FIXATION (ORIF) RADIUS / ULNA (WRIST);   Surgeon: Ivan Krause MD;  Location: [facility];  Service: Orthopedics   • RELEASE SCAR CONTRACTURE / GRAFT REPAIRS OF HAND Bilateral    • UPPER GASTROINTESTINAL ENDOSCOPY  2019    Dr Barbara Mota  Botox to the pylorus       Family History   Problem Relation Age of Onset   • Arthritis Mother    • Depression Mother    • Diabetes Mother    • Mental illness Mother    • Migraines Mother    • No Known Problems Father    • Colon cancer Neg Hx    • Drug abuse Neg Hx         mother father   • Mental illness Neg Hx         disorder, mother father   • Cancer Neg Hx    • Breast cancer Neg Hx      I have reviewed and agree with the history as documented  E-Cigarette/Vaping   • E-Cigarette Use Never User      E-Cigarette/Vaping Substances   • Nicotine No    • THC No    • CBD No    • Flavoring No    • Other No    • Unknown No      Social History     Tobacco Use   • Smoking status: Former     Packs/day:      Years: 10 00     Pack years: 10 00     Types: Cigarettes     Quit date: 2011     Years since quittin 2   • Smokeless tobacco: Never   • Tobacco comments:     Stopped smoking 11 years ago   Vaping Use   • Vaping Use: Never used   Substance Use Topics   • Alcohol use: Never   • Drug use: Yes     Types: Marijuana     Comment: marijuana daily       Review of Systems   Constitutional: Negative for appetite change, fatigue and fever  HENT: Negative for rhinorrhea and sore throat  Eyes: Negative for pain  Respiratory: Negative for cough, shortness of breath and wheezing  Cardiovascular: Negative for chest pain and leg swelling  Gastrointestinal: Negative for abdominal pain, diarrhea and vomiting  Genitourinary: Positive for difficulty urinating and flank pain  Negative for dysuria  Musculoskeletal: Negative for back pain and neck pain  Skin: Negative for rash  Neurological: Negative for syncope and headaches  Psychiatric/Behavioral:        Mood normal       Physical Exam  Physical Exam  Vitals and nursing note reviewed     Constitutional:       Appearance: She is well-developed  HENT:      Head: Normocephalic and atraumatic  Right Ear: External ear normal       Left Ear: External ear normal    Eyes:      General: No scleral icterus  Extraocular Movements: Extraocular movements intact  Cardiovascular:      Rate and Rhythm: Normal rate and regular rhythm  Pulmonary:      Effort: Pulmonary effort is normal  No respiratory distress  Breath sounds: Normal breath sounds  Abdominal:      Palpations: Abdomen is soft  Tenderness: There is no abdominal tenderness  Musculoskeletal:         General: No deformity or signs of injury  Cervical back: Normal range of motion and neck supple  Comments: R CVA tenderness   Skin:     General: Skin is warm and dry  Coloration: Skin is not jaundiced or pale  Neurological:      General: No focal deficit present  Mental Status: She is alert and oriented to person, place, and time     Psychiatric:         Mood and Affect: Mood normal          Behavior: Behavior normal          Vital Signs  ED Triage Vitals [03/28/23 0920]   Temperature Pulse Respirations Blood Pressure SpO2   98 3 °F (36 8 °C) 68 18 115/70 95 %      Temp Source Heart Rate Source Patient Position - Orthostatic VS BP Location FiO2 (%)   Oral Monitor Sitting Right arm --      Pain Score       7           Vitals:    03/28/23 0920 03/28/23 1220   BP: 115/70 115/56   Pulse: 68 55   Patient Position - Orthostatic VS: Sitting Lying         Visual Acuity      ED Medications  Medications   sodium chloride 0 9 % bolus 1,000 mL (0 mL Intravenous Stopped 3/28/23 1216)   morphine injection 4 mg (4 mg Intravenous Given 3/28/23 1026)       Diagnostic Studies  Results Reviewed     Procedure Component Value Units Date/Time    Urine Microscopic [[id number]]  (Abnormal) Collected: 03/28/23 1214    Lab Status: Final result Specimen: Urine, Clean Catch Updated: 03/28/23 1302     RBC, UA 0-1 /hpf      WBC, UA 1-2 /hpf      Epithelial Cells Occasional /hpf      Bacteria, UA Occasional /hpf     Urine culture [[id number]]     Lab Status: No result Specimen: Urine     Urine Macroscopic, POC [[id number]]  (Abnormal) Collected: 03/28/23 1214    Lab Status: Final result Specimen: Urine Updated: 03/28/23 1215     Color, UA Yellow     Clarity, UA Clear     pH, UA 6 0     Leukocytes, UA Trace     Nitrite, UA Negative     Protein,  (2+) mg/dl      Glucose, UA Negative mg/dl      Ketones, UA Negative mg/dl      Urobilinogen, UA 0 2 E U /dl      Bilirubin, UA Negative     Occult Blood, UA Negative     Specific Gravity, UA 1 025    Narrative:      CLINITEK RESULT    Lipase [[id number]]  (Abnormal) Collected: 03/28/23 1026    Lab Status: Final result Specimen: Blood from Arm, Right Updated: 03/28/23 1049     Lipase 100 u/L     Comprehensive metabolic panel [[id number]]  (Abnormal) Collected: 03/28/23 1026    Lab Status: Final result Specimen: Blood from Arm, Right Updated: 03/28/23 1049     Sodium 141 mmol/L      Potassium 4 5 mmol/L      Chloride 110 mmol/L      CO2 26 mmol/L      ANION GAP 5 mmol/L      BUN 35 mg/dL      Creatinine 1 95 mg/dL      Glucose 87 mg/dL      Calcium 9 3 mg/dL      AST 16 U/L      ALT 12 U/L      Alkaline Phosphatase 96 U/L      Total Protein 7 5 g/dL      Albumin 4 2 g/dL      Total Bilirubin 0 26 mg/dL      eGFR 28 ml/min/1 73sq m     Narrative:      National Kidney Disease Foundation guidelines for Chronic Kidney Disease (CKD):   •  Stage 1 with normal or high GFR (GFR > 90 mL/min/1 73 square meters)  •  Stage 2 Mild CKD (GFR = 60-89 mL/min/1 73 square meters)  •  Stage 3A Moderate CKD (GFR = 45-59 mL/min/1 73 square meters)  •  Stage 3B Moderate CKD (GFR = 30-44 mL/min/1 73 square meters)  •  Stage 4 Severe CKD (GFR = 15-29 mL/min/1 73 square meters)  •  Stage 5 End Stage CKD (GFR <15 mL/min/1 73 square meters)  Note: GFR calculation is accurate only with a steady state creatinine    CBC and differential [[id number]] Collected: 03/28/23 1026 Lab Status: Final result Specimen: Blood from Arm, Right Updated: 03/28/23 1035     WBC 5 79 Thousand/uL      RBC 4 23 Million/uL      Hemoglobin 13 3 g/dL      Hematocrit 40 0 %      MCV 95 fL      MCH 31 4 pg      MCHC 33 3 g/dL      RDW 13 2 %      MPV 10 2 fL      Platelets 636 Thousands/uL      nRBC 0 /100 WBCs      Neutrophils Relative 64 %      Immat GRANS % 0 %      Lymphocytes Relative 28 %      Monocytes Relative 7 %      Eosinophils Relative 1 %      Basophils Relative 0 %      Neutrophils Absolute 3 67 Thousands/µL      Immature Grans Absolute 0 02 Thousand/uL      Lymphocytes Absolute 1 62 Thousands/µL      Monocytes Absolute 0 43 Thousand/µL      Eosinophils Absolute 0 03 Thousand/µL      Basophils Absolute 0 02 Thousands/µL                  CT renal stone study abdomen pelvis without contrast   Final Result by Maame Stubbs MD (03/28 1102)      No acute pathology  No urolithiasis or obstructive uropathy  Colonic diverticulosis  Workstation performed: [id number]                    Procedures  Procedures         ED Course                               SBIRT 20yo+    Flowsheet Row Most Recent Value   SBIRT (23 yo +)    In order to provide better care to our patients, we are screening all of our patients for alcohol and drug use  Would it be okay to ask you these screening questions? Unable to answer at this time Filed at: 03/28/2023 1244                    Medical Decision Making  I went over the CAT scan and lab work with the patient she is stable for outpatient follow-up with her family doctor and pain management  she has pain meds at home    Flank pain: acute illness or injury  Amount and/or Complexity of Data Reviewed  Labs: ordered  Radiology: ordered  Risk  Prescription drug management            Disposition  Final diagnoses:   Flank pain     Time reflects when diagnosis was documented in both MDM as applicable and the Disposition within this note     Time User Action Codes Description Comment    3/28/2023 12:37 PM Karrie Snellen R Add [R10 9] Flank pain       ED Disposition     ED Disposition   Discharge    Condition   Stable    Date/Time   Tue Mar 28, 2023 12:37 PM    Comment   Hayden White discharge to home/self care                 Follow-up Information     Follow up With Specialties Details Why [facility]  follow up with your family dr  and pain management [address]  [address] [facility]  [place] [address] [facility]  [phone]            Discharge Medication List as of 3/28/2023 12:38 PM      CONTINUE these medications which have NOT CHANGED    Details   acetaminophen (TYLENOL) 500 mg tablet Take 2 tablets (1,000 mg total) by mouth every 8 (eight) hours as needed for mild pain, Starting Mon 2/28/2022, Normal      al mag oxide-diphenhydramine-lidocaine viscous (MAGIC MOUTHWASH) 1:1:1 suspension Swish and spit 10 mL every 4 (four) hours as needed for mouth pain or discomfort, Starting Fri 9/2/2022, Normal      albuterol (2 5 mg/3 mL) 0 083 % nebulizer solution USE 1 VIAL VIA NEBULIZER EVERY 6 HOURS AS NEEDED FOR WHEEZING OR SHORTNESS OF BREATH, Normal      albuterol (PROVENTIL HFA,VENTOLIN HFA) 90 mcg/act inhaler INHALE 2 PUFFS BY MOUTH EVERY 6 HOURS AS NEEDED FOR WHEEZING OR SHORTNESS OF BREATH, Normal      Alcohol Swabs (Alcohol Pads) 70 % PADS Use 4 (four) times a day, Starting Wed 12/1/2021, Normal      amphetamine-dextroamphetamine (ADDERALL XR) 20 MG 24 hr capsule Take 1 capsule (20 mg total) by mouth 2 (two) times a day Max Daily Amount: 40 mg, Starting Thu 3/2/2023, Normal      amphetamine-dextroamphetamine (ADDERALL, 20MG,) 20 mg tablet Take 1 tablet (20 mg total) by mouth 2 (two) times a day Max Daily Amount: 40 mg, Starting Th 3/9/2023, Normal      Blood Glucose Monitoring Suppl (FreeStyle Lite) DEIRDRE Use daily, Starting Wed 9/21/2022, Normal      Blood Pressure Monitor KIT Use daily to check blood pressure , Normal buPROPion (WELLBUTRIN XL) 300 mg 24 hr tablet Take 1 tablet (300 mg total) by mouth every morning, Starting Sat 2/11/2023, Normal      !! Calcium 600 1500 (600 Ca) MG TABS Starting Tue 12/20/2022, Historical Med      !! calcium carbonate (OS-ALISIA) 600 MG tablet Take 1 tablet (600 mg total) by mouth daily, Starting Fri 9/2/2022, Normal      Cholecalciferol (Vitamin D) 50 MCG (2000 UT) tablet Take 1 tablet (2,000 Units total) by mouth daily, Starting Fri 9/2/2022, Normal      clobetasol (TEMOVATE) 0 05 % cream APPLY TOPICALLY TO AFFECTED AREA TWICE A DAY, Normal      Comfort Touch Insulin Pen Need 33G X 6 MM MISC USE TO INJECT INSULIN FOUR TIMES A DAY ( WITH MEALS AND AT BEDTIME ), Normal      Diclofenac Sodium (VOLTAREN) 1 % APPLY TWO GRAMS TOPICALLY FOUR TIMES A DAY, Normal      dicyclomine (BENTYL) 20 mg tablet Take 1 tablet (20 mg total) by mouth in the morning and 1 tablet (20 mg total) in the evening  Do all this for 7 days  , Starting Thu 5/12/2022, Until Tue 3/28/2023, Normal      Easy Comfort Lancets MISC USE TO TEST THE BLOOD SUGAR THREE TIMES A DAY, Normal      glucose blood (FREESTYLE LITE) test strip USE TO TEST THE BLOOD SUGAR THREE TIMES A DAY AS DIRECTED, Normal      guaiFENesin (ROBITUSSIN) 100 MG/5ML oral liquid Take 10 mL (200 mg total) by mouth 3 (three) times a day as needed for cough or congestion, Starting Fri 12/30/2022, Normal      haloperidol (HALDOL) 5 mg tablet TAKE ONE TABLET BY MOUTH ONCE DAILY AS NEEDED FOR ABDOMINAL PAIN / NAUSEA, Normal      hydrocortisone 2 5 % cream Apply topically 2 (two) times a day as needed for rash, Starting Wed 10/26/2022, Normal      insulin glargine (Toujeo Max SoloStar) 300 units/mL CONCENTRATED U-300 injection pen (2-unit dial) Inject 22 Units under the skin daily, Starting Wed 10/26/2022, Normal      lidocaine (LMX) 4 % cream Apply topically as needed for mild pain, Starting Tue 2/28/2023, Normal      lidocaine (XYLOCAINE) 5 % ointment APPLY TOPICALLY 2GM TWICE A DAY TO AFFECTED AREAS AS NEEDED FOR MILD PAIN, Normal      Linzess 72 MCG CAPS TAKE ONE CAPSULE BY MOUTH ONCE DAILY, Normal      methylPREDNISolone 4 MG tablet therapy pack Use as directed on package, Normal      naloxone (Narcan) 4 mg/0 1 mL nasal spray ADMINISTER 1 SPRAY IN ONE NOSTRIL IF NO REPONSE AFTER 2-3 MINUTES SPRAY INTO OTHER NOSTRIL WITH NEW spray, Normal      NON FORMULARY Medical [facility], Historical Med      nortriptyline (PAMELOR) 10 mg capsule Take 1 capsule (10 mg total) by mouth daily at bedtime, Starting Tue 1/10/2023, Normal      NovoLOG FlexPen 100 units/mL injection pen INJECT 6 UNITS UNDER THE SKIN THREE TIMES A DAY WITH MEALS, Normal      nystatin-triamcinolone (MYCOLOG-II) ointment Apply topically 2 (two) times a day, Starting Wed 10/26/2022, Normal      ondansetron (Zofran ODT) 8 mg disintegrating tablet Take 1 tablet (8 mg total) by mouth every 8 (eight) hours as needed for nausea or vomiting, Starting Mon 3/27/2023, Normal      oxyCODONE (Roxicodone) 15 mg immediate release tablet Take 1 tablet (15 mg total) by mouth every 4 (four) hours as needed for moderate pain Max Daily Amount: 90 mg, Starting Mon 3/20/2023, Normal      pantoprazole (PROTONIX) 40 mg tablet Take 1 tablet (40 mg total) by mouth 2 (two) times a day before meals, Starting Tue 1/18/2022, Normal      pregabalin (LYRICA) 100 mg capsule Take 1 capsule (100 mg total) by mouth 3 (three) times a day, Starting Thu 7/21/2022, Normal      promethazine (PHENERGAN) 25 mg tablet Take 1 tablet (25 mg total) by mouth every 6 (six) hours as needed for nausea or vomiting, Starting Mon 5/10/2021, Normal      Restasis 0 05 % ophthalmic emulsion Starting Wed 12/29/2021, Historical Med      scopolamine (TRANSDERM-SCOP) 1 5 mg/3 days TD 72 hr patch Place 1 patch on the skin every third day, Starting Tue 4/27/2021, Normal      Symbicort 80-4 5 MCG/ACT inhaler INHALE 2 PUFFS BY MOUTH TWICE A DAY RINSE MOUTH AFTER USE, Normal TiZANidine (ZANAFLEX) 4 MG capsule TAKE ONE CAPSULE BY MOUTH THREE TIMES A DAY, Normal       !! - Potential duplicate medications found  Please discuss with provider  No discharge procedures on file      PDMP Review       Value Time User    PDMP Reviewed  Yes 3/20/2023  8:35 AM Arron Lizama PA-C          ED Provider  Electronically Signed by           Elizabeth Simpson MD  03/28/23 4779

## 2023-03-28 NOTE — TELEPHONE ENCOUNTER
Spoke with patient and she ended up in the ed for kidney pain  I asked her to please give us a call back to schedule her a ed f/u

## 2023-03-29 ENCOUNTER — VBI (OUTPATIENT)
Dept: FAMILY MEDICINE CLINIC | Facility: CLINIC | Age: 53
End: 2023-03-29

## 2023-03-29 NOTE — TELEPHONE ENCOUNTER
[facility]    ED Visit Information     Ed visit date: 3/28/23  Diagnosis Description: Flank pain  In Network? Yes [facility]  Discharge status: Home  Discharged with meds ? No  Number of ED visits to date: 1  ED Severity:3     Outreach Information    Outreach successful: Yes 1  Date letter mailed:NA  Date Finalized:3/29/23    Care Coordination    Follow up appointment with pcp: no    Transportation issues ? No    Value [employer] type: 7 Day Outreach  [facility] PCP: Yes  If able to choose an ED  Would you have chosen [facility]: Yes  Called PCP first?: No  Feels able to call PCP for urgent problems ?: Yes  Understands what emergencies can be handled by PCP ?: Yes  Ever any problems getting appointment with PCP for minor emergency/urgency problems?: No  Practice Contacted Patient ?: No  Pt had ED follow up with pcp/staff ?: No    Seen for follow-up out of network ?: No  Reason Patient went to ED instead of Urgent Care or PCP?: Perceived Severity of Illness  03/29/2023 01:15 PM EDT by Lemuel Mayberry MA 03/29/2023 01:15 PM EDT by FLORY Burdick (Self) [phone] (NQFTUA)[phone] (Mobile)  [phone] (Mobile) Remove  - Call Complete  Patient is doing better   Office contact patient to set up appt

## 2023-03-30 LAB — BACTERIA UR CULT: NORMAL

## 2023-04-05 DIAGNOSIS — F31.9 BIPOLAR 1 DISORDER (HCC): ICD-10-CM

## 2023-04-06 RX ORDER — DEXTROAMPHETAMINE SACCHARATE, AMPHETAMINE ASPARTATE MONOHYDRATE, DEXTROAMPHETAMINE SULFATE AND AMPHETAMINE SULFATE 5; 5; 5; 5 MG/1; MG/1; MG/1; MG/1
20 CAPSULE, EXTENDED RELEASE ORAL 2 TIMES DAILY
Qty: 60 CAPSULE | Refills: 0 | Status: SHIPPED | OUTPATIENT
Start: 2023-04-06

## 2023-04-07 DIAGNOSIS — F90.9 ATTENTION DEFICIT HYPERACTIVITY DISORDER (ADHD), UNSPECIFIED ADHD TYPE: Chronic | ICD-10-CM

## 2023-04-07 RX ORDER — DEXTROAMPHETAMINE SACCHARATE, AMPHETAMINE ASPARTATE, DEXTROAMPHETAMINE SULFATE AND AMPHETAMINE SULFATE 5; 5; 5; 5 MG/1; MG/1; MG/1; MG/1
20 TABLET ORAL 2 TIMES DAILY
Qty: 60 TABLET | Refills: 0 | Status: SHIPPED | OUTPATIENT
Start: 2023-04-07

## 2023-04-13 PROBLEM — G47.00 INSOMNIA: Status: ACTIVE | Noted: 2023-04-13

## 2023-04-13 PROBLEM — G47.00 INSOMNIA: Chronic | Status: ACTIVE | Noted: 2023-04-13

## 2023-04-21 ENCOUNTER — HOSPITAL ENCOUNTER (OUTPATIENT)
Dept: RADIOLOGY | Facility: MEDICAL CENTER | Age: 53
Discharge: HOME | End: 2023-04-21

## 2023-04-21 VITALS
TEMPERATURE: 98.6 F | HEART RATE: 81 BPM | OXYGEN SATURATION: 98 % | RESPIRATION RATE: 18 BRPM | DIASTOLIC BLOOD PRESSURE: 71 MMHG | SYSTOLIC BLOOD PRESSURE: 106 MMHG

## 2023-04-21 DIAGNOSIS — M54.12 CERVICAL RADICULOPATHY: ICD-10-CM

## 2023-04-21 RX ORDER — METHYLPREDNISOLONE ACETATE 80 MG/ML
80 INJECTION, SUSPENSION INTRA-ARTICULAR; INTRALESIONAL; INTRAMUSCULAR; PARENTERAL; SOFT TISSUE ONCE
Status: COMPLETED | OUTPATIENT
Start: 2023-04-21 | End: 2023-04-21

## 2023-04-21 RX ADMIN — METHYLPREDNISOLONE ACETATE 80 MG: 80 INJECTION, SUSPENSION INTRA-ARTICULAR; INTRALESIONAL; INTRAMUSCULAR; PARENTERAL; SOFT TISSUE at 13:14

## 2023-04-21 RX ADMIN — IOHEXOL 2 ML: 300 INJECTION, SOLUTION INTRAVENOUS at 13:14

## 2023-04-21 NOTE — H&P
History of Present Illness: The patient is a 46 y o  female who presents with complaints of neck and arm pain    Past Medical History:   Diagnosis Date   • ADHD    • Anemia of chronic disease    • Anxiety    • Arthritis ? • Asthma    • Bipolar disorder ([place] [address] )    • Borderline personality disorder ([place] [address] )    • Cataplexy    • Chronic abdominal pain    • Chronic kidney disease ? • CKD (chronic kidney disease) stage 3, GFR 30-59 ml/min (HCC)    • Cushing syndrome (HCC)    • Depression ? • Diabetes mellitus ([place] [address] )    • DVT (deep venous thrombosis) ([facility])    • GERD (gastroesophageal reflux disease)    • Headache(784 0) 3 months   • History of acute pancreatitis     felt secondary to Bactrim   • History of transfusion    • Hypertension    • Liver disease     fatty liver   • Microscopic polyangiitis (HCC)    • Ovarian cyst    • PTSD (post-traumatic stress disorder)    • Self-inflicted injury     self inflicted skin wounds   • Sleep apnea    • Wegener's granulomatosis with renal involvement ([place] [address] ) 2015       Past Surgical History:   Procedure Laterality Date   • ESOPHAGOGASTRODUODENOSCOPY  09/11/2015    mild antral gastritis   • GASTRIC STIMULATOR IMPLANT SURGERY  06/25/2020   • AL COLONOSCOPY FLX DX W/COLLJ SPEC WHEN PFRMD N/A 12/14/2018    adenoma removed from the transverse, hyperplastic polyp removed from the left colon   • AL ESOPHAGOGASTRODUODENOSCOPY TRANSORAL DIAGNOSTIC N/A 12/14/2018    gastritis and scant coffee-ground material   Biopsies negative for H  pylori   • AL OPEN TX RADIAL&ULNAR SHAFT FX W/FIXJ RADIUS&ULNA Left 6/23/2022    Procedure: OPEN REDUCTION W/ INTERNAL FIXATION (ORIF) RADIUS / ULNA (WRIST); Surgeon: Mikayla Mccabe MD;  Location: [facility];  Service: Orthopedics   • RELEASE SCAR CONTRACTURE / GRAFT REPAIRS OF HAND Bilateral    • UPPER GASTROINTESTINAL ENDOSCOPY  12/26/2019    Dr Troy Vincent to the pylorus         Current Outpatient Medications:   •  oxyCODONE (Roxicodone) 15 mg immediate release tablet, Take 1 tablet (15 mg total) by mouth every 4 (four) hours as needed for moderate pain Max Daily Amount: 90 mg, Disp: 180 tablet, Rfl: 0  •  acetaminophen (TYLENOL) 500 mg tablet, Take 2 tablets (1,000 mg total) by mouth every 8 (eight) hours as needed for mild pain, Disp: 60 tablet, Rfl: 1  •  al mag oxide-diphenhydramine-lidocaine viscous (MAGIC MOUTHWASH) 1:1:1 suspension, Swish and spit 10 mL every 4 (four) hours as needed for mouth pain or discomfort, Disp: 90 mL, Rfl: 2  •  albuterol (2 5 mg/3 mL) 0 083 % nebulizer solution, USE 1 VIAL VIA NEBULIZER EVERY 6 HOURS AS NEEDED FOR WHEEZING OR SHORTNESS OF BREATH, Disp: 90 mL, Rfl: 0  •  albuterol (PROVENTIL HFA,VENTOLIN HFA) 90 mcg/act inhaler, INHALE 2 PUFFS BY MOUTH EVERY 6 HOURS AS NEEDED FOR WHEEZING OR SHORTNESS OF BREATH, Disp: 18 g, Rfl: 1  •  Alcohol Swabs (Alcohol Pads) 70 % PADS, Use 4 (four) times a day, Disp: 400 each, Rfl: 2  •  amphetamine-dextroamphetamine (ADDERALL XR) 20 MG 24 hr capsule, Take 1 capsule (20 mg total) by mouth 2 (two) times a day Max Daily Amount: 40 mg, Disp: 60 capsule, Rfl: 0  •  amphetamine-dextroamphetamine (ADDERALL, 20MG,) 20 mg tablet, Take 1 tablet (20 mg total) by mouth 2 (two) times a day Max Daily Amount: 40 mg, Disp: 60 tablet, Rfl: 0  •  Blood Glucose Monitoring Suppl (FreeStyle Lite) DEIRDRE, Use daily, Disp: 1 each, Rfl: 0  •  Blood Pressure Monitor KIT, Use daily to check blood pressure , Disp: 1 kit, Rfl: 0  •  buPROPion (WELLBUTRIN XL) 300 mg 24 hr tablet, Take 1 tablet (300 mg total) by mouth every morning (Patient not taking: Reported on 2/19/2023), Disp: 90 tablet, Rfl: 1  •  Calcium 600 1500 (600 Ca) MG TABS, , Disp: , Rfl:   •  calcium carbonate (OS-ALISIA) 600 MG tablet, Take 1 tablet (600 mg total) by mouth daily (Patient not taking: Reported on 2/19/2023), Disp: 90 tablet, Rfl: 1  •  Cholecalciferol (Vitamin D) 50 MCG (2000 UT) tablet, Take 1 tablet (2,000 Units total) by mouth daily (Patient not taking: Reported on 2/19/2023), Disp: 90 tablet, Rfl: 1  •  clobetasol (TEMOVATE) 0 05 % cream, APPLY TOPICALLY TO AFFECTED AREA TWICE A DAY, Disp: 60 g, Rfl: 2  •  Comfort Touch Insulin Pen Need 33G X 6 MM MISC, USE TO INJECT INSULIN FOUR TIMES A DAY ( WITH MEALS AND AT BEDTIME ), Disp: 100 each, Rfl: 10  •  Comfort Touch Plus Lancets 30G MISC, USE TO TEST THE BLOOD SUGAR THREE TIMES A DAY, Disp: 100 each, Rfl: 5  •  Diclofenac Sodium (VOLTAREN) 1 %, APPLY TWO GRAMS TOPICALLY FOUR TIMES A DAY, Disp: 200 g, Rfl: 2  •  dicyclomine (BENTYL) 20 mg tablet, Take 1 tablet (20 mg total) by mouth in the morning and 1 tablet (20 mg total) in the evening  Do all this for 7 days  , Disp: 20 tablet, Rfl: 0  •  glucose blood (FREESTYLE LITE) test strip, USE TO TEST THE BLOOD SUGAR THREE TIMES A DAY AS DIRECTED, Disp: 100 strip, Rfl: 10  •  guaiFENesin (ROBITUSSIN) 100 MG/5ML oral liquid, Take 10 mL (200 mg total) by mouth 3 (three) times a day as needed for cough or congestion, Disp: 473 mL, Rfl: 0  •  haloperidol (HALDOL) 5 mg tablet, TAKE ONE TABLET BY MOUTH ONCE DAILY AS NEEDED FOR ABDOMINAL PAIN / NAUSEA, Disp: 10 tablet, Rfl: 0  •  hydrocortisone 2 5 % cream, Apply topically 2 (two) times a day as needed for rash, Disp: 30 g, Rfl: 1  •  lidocaine (LMX) 4 % cream, Apply topically as needed for mild pain, Disp: 45 g, Rfl: 2  •  lidocaine (XYLOCAINE) 5 % ointment, APPLY TOPICALLY 2GM TWICE A DAY TO AFFECTED AREAS AS NEEDED FOR MILD PAIN (Patient not taking: Reported on 3/28/2023), Disp: 250 g, Rfl: 8  •  Linzess 72 MCG CAPS, TAKE ONE CAPSULE BY MOUTH ONCE DAILY, Disp: 90 capsule, Rfl: 10  •  methylPREDNISolone 4 MG tablet therapy pack, Use as directed on package (Patient not taking: Reported on 3/27/2023), Disp: 1 each, Rfl: 1  •  naloxone (Narcan) 4 mg/0 1 mL nasal spray, ADMINISTER 1 SPRAY IN ONE NOSTRIL IF NO REPONSE AFTER 2-3 MINUTES SPRAY INTO OTHER NOSTRIL WITH NEW spray, Disp: 2 each, Rfl: 2  •  NON FORMULARY, [facility], Disp: , Rfl:   •  nortriptyline (PAMELOR) 10 mg capsule, Take 1 capsule (10 mg total) by mouth daily at bedtime, Disp: 30 capsule, Rfl: 2  •  NovoLOG FlexPen 100 units/mL injection pen, INJECT 6 UNITS SUBCUTANEOUSLY THREE TIMES A DAY WITH MEALS, Disp: 15 mL, Rfl: 0  •  nystatin-triamcinolone (MYCOLOG-II) ointment, Apply topically 2 (two) times a day (Patient not taking: Reported on 2/19/2023), Disp: 30 g, Rfl: 1  •  ondansetron (Zofran ODT) 8 mg disintegrating tablet, Take 1 tablet (8 mg total) by mouth every 8 (eight) hours as needed for nausea or vomiting, Disp: 30 tablet, Rfl: 2  •  pantoprazole (PROTONIX) 40 mg tablet, Take 1 tablet (40 mg total) by mouth 2 (two) times a day before meals (Patient not taking: Reported on 2/19/2023), Disp: 180 tablet, Rfl: 1  •  pregabalin (LYRICA) 100 mg capsule, Take 1 capsule (100 mg total) by mouth 3 (three) times a day (Patient not taking: Reported on 2/19/2023), Disp: 90 capsule, Rfl: 1  •  promethazine (PHENERGAN) 25 mg tablet, Take 1 tablet (25 mg total) by mouth every 6 (six) hours as needed for nausea or vomiting (Patient not taking: Reported on 2/19/2023), Disp: 60 tablet, Rfl: 3  •  Restasis 0 05 % ophthalmic emulsion, , Disp: , Rfl:   •  scopolamine (TRANSDERM-SCOP) 1 5 mg/3 days TD 72 hr patch, Place 1 patch on the skin every third day (Patient not taking: Reported on 2/19/2023), Disp: 10 patch, Rfl: 0  •  Symbicort 80-4 5 MCG/ACT inhaler, INHALE 2 PUFFS BY MOUTH TWICE A DAY RINSE MOUTH AFTER USE, Disp: 10 2 g, Rfl: 2  •  TiZANidine (ZANAFLEX) 4 MG capsule, TAKE ONE CAPSULE BY MOUTH THREE TIMES A DAY (Patient not taking: Reported on 2/19/2023), Disp: 90 capsule, Rfl: 1  •  Toujeo Max SoloStar 300 units/mL CONCENTRATED U-300 injection pen (2-unit dial), INJECT 22 UNITS SUBCUTANEOUSLY ONCE DAILY, Disp: 6 mL, Rfl: 2  •  traZODone (DESYREL) 50 mg tablet, Take 1 tablet (50 mg total) by mouth daily at bedtime, Disp: 30 tablet, Rfl: 1    Allergies   Allergen Reactions   • "Prozac [Fluoxetine Hcl]      SI   • Bactrim [Sulfamethoxazole-Trimethoprim]      Pt \"They think that is what cause the pancreatitis\"    • Flagyl [Metronidazole] Diarrhea and Abdominal Pain   • Lamictal [Lamotrigine] GI Intolerance   • Lithium Other (See Comments)   • Ibuprofen    • Lexapro [Escitalopram Oxalate] Rash   • Navane [Thiothixene]      SI   • Other      \"novaine? \" antipsychotic       Physical Exam:   Vitals:    04/21/23 1256   BP: 102/68   Pulse: 88   Resp: 18   Temp: 98 6 °F (37 °C)   SpO2: 98%     General: Awake, Alert, Oriented x 3, Mood and affect appropriate  Respiratory: Respirations even and unlabored  Cardiovascular: Peripheral pulses intact; no edema  Musculoskeletal Exam: neck and arm pain    ASA Score: 2    Patient/Chart Verification  Patient ID Verified: Verbal  ID Band Applied: No  Consents Confirmed: Procedural  H&P( within 30 days) Verified: To be obtained in the Pre-Procedure area  Interval H&P(within 24 hr) Complete (required for Outpatients and Surgery Admit only): To be obtained in the Pre-Procedure area  Allergies Reviewed: Yes  Anticoag/NSAID held?: No  Currently on antibiotics?: No  Pre-op Lab/Test Results Available: N/A  Pregnancy Lab Collected: N/A comment    Assessment:   1   Cervical radiculopathy        Plan: C7-T1 YANNI    "

## 2023-04-21 NOTE — DISCHARGE INSTRUCTIONS
Epidural Steroid Injection   WHAT YOU NEED TO KNOW:   An epidural steroid injection (DUC) is a procedure to inject steroid medicine into the epidural space  The epidural space is between your spinal cord and vertebrae  Steroids reduce inflammation and fluid buildup in your spine that may be causing pain  You may be given pain medicine along with the steroids  ACTIVITY  Do not drive or operate machinery today  No strenuous activity today - bending, lifting, etc   You may resume normal activites starting tomorrow - start slowly and as tolerated  You may shower today, but no tub baths or hot tubs  You may have numbness for several hours from the local anesthetic  Please use caution and common sense, especially with weight-bearing activities  CARE OF THE INJECTION SITE  If you have soreness or pain, apply ice to the area today (20 minutes on/20 minutes off)  Starting tomorrow, you may use warm, moist heat or ice if needed  You may have an increase or change in your discomfort for 36-48 hours after your treatment  Apply ice and continue with any pain medication you have been prescribed  Notify the [facility] if you have any of the following: redness, drainage, swelling, headache, stiff neck or fever above 100°F     SPECIAL INSTRUCTIONS  Our office will contact you in approximately 7 days for a progress report  MEDICATIONS  Continue to take all routine medications  Our office may have instructed you to hold some medications  As no general anesthesia was used in today's procedure, you should not experience any side effects related to anesthesia  If you are diabetic, the steroids used in today's injection may temporarily increase your blood sugar levels after the first few days after your injection  Please keep a close eye on your sugars and alert the doctor who manages your diabetes if your sugars are significantly high from your baseline or you are symptomatic       If you have a problem specifically related to your procedure, please call our office at [phone]  Problems not related to your procedure should be directed to your primary care physician

## 2023-04-28 ENCOUNTER — TELEPHONE (OUTPATIENT)
Dept: RADIOLOGY | Facility: MEDICAL CENTER | Age: 53
End: 2023-04-28

## 2023-04-28 NOTE — TELEPHONE ENCOUNTER
Patient Reports       40  %     improvement post injection    Pain Level   5  /10    Patient is requesting a call back next week to see how she is doing

## 2023-05-01 DIAGNOSIS — F90.9 ATTENTION DEFICIT HYPERACTIVITY DISORDER (ADHD), UNSPECIFIED ADHD TYPE: Chronic | ICD-10-CM

## 2023-05-02 DIAGNOSIS — R21 RASH: ICD-10-CM

## 2023-05-02 RX ORDER — DEXTROAMPHETAMINE SACCHARATE, AMPHETAMINE ASPARTATE, DEXTROAMPHETAMINE SULFATE AND AMPHETAMINE SULFATE 5; 5; 5; 5 MG/1; MG/1; MG/1; MG/1
20 TABLET ORAL 2 TIMES DAILY
Qty: 60 TABLET | Refills: 0 | Status: SHIPPED | OUTPATIENT
Start: 2023-05-02

## 2023-05-04 RX ORDER — CLOBETASOL PROPIONATE 0.5 MG/G
CREAM TOPICAL
Qty: 60 G | Refills: 1 | Status: SHIPPED | OUTPATIENT
Start: 2023-05-04

## 2023-05-05 ENCOUNTER — HOSPITAL ENCOUNTER (EMERGENCY)
Facility: HOSPITAL | Age: 53
Discharge: HOME | End: 2023-05-05
Attending: EMERGENCY MEDICINE

## 2023-05-05 VITALS
RESPIRATION RATE: 18 BRPM | OXYGEN SATURATION: 97 % | TEMPERATURE: 98.1 F | DIASTOLIC BLOOD PRESSURE: 80 MMHG | WEIGHT: 140 LBS | BODY MASS INDEX: 25.61 KG/M2 | SYSTOLIC BLOOD PRESSURE: 119 MMHG | HEART RATE: 82 BPM

## 2023-05-05 DIAGNOSIS — S61.219A FINGER LACERATION: Primary | ICD-10-CM

## 2023-05-05 RX ADMIN — TETANUS TOXOID, REDUCED DIPHTHERIA TOXOID AND ACELLULAR PERTUSSIS VACCINE, ADSORBED 0.5 ML: 5; 2.5; 8; 8; 2.5 SUSPENSION INTRAMUSCULAR at 17:28

## 2023-05-05 NOTE — DISCHARGE INSTRUCTIONS
Keep wound clean and dry  Wear splint for index finger so the glue will keep heal the wound  The glue will come off on its own    Middle finger- the surgicel will come off on its own, after 3-5 days may remove    DO NOT soak hand in water, use hydrogen peroxide or Vaseline

## 2023-05-05 NOTE — ED ATTENDING ATTESTATION
5/5/2023  IJonah MD, saw and evaluated the patient  I have discussed the patient with the resident/non-physician practitioner and agree with the resident's/non-physician practitioner's findings, Plan of Care, and MDM as documented in the resident's/non-physician practitioner's note, except where noted  All available labs and Radiology studies were reviewed  I was present for key portions of any procedure(s) performed by the resident/non-physician practitioner and I was immediately available to provide assistance  At this point I agree with the current assessment done in the Emergency Department  I have conducted an independent evaluation of this patient a history and physical is as follows:  47 yo F cut 3rd and 4th digit while cutting chicken  Wounds evaluated and irrigated  See description and media imaging  The 4th digit wound has no tissue to approximate, bleedign controlled with dressing   3rd digit flap can be approximated, secured with dressing  She did not want any sutures  Tetanus UTD       ED Course         Critical Care Time  Procedures

## 2023-05-05 NOTE — ED PROVIDER NOTES
History  Chief Complaint   Patient presents with    Laceration     Reports injured self with  knife while cutting up chicken injury to 3rd and 4th digit of left hand     45 y/o female presents to the ER for evaluation of laceration  Patient stated that about 15 minutes prior to arrival she was making dinner when she cut the left 2nd and 3rd digit with a  knife  Left second digit DIP has a laceration approximately 1 cm in length  Left third digit DIP has 0 5 cm avulsion  Bleeding controlled  Tetanus out of date  Neurovascularly intact, sensation equal bilaterally  Full range of motion  Patient is right hand dominant  No red streaking, swelling, fever, nausea or vomiting  History provided by:  Patient      Prior to Admission Medications   Prescriptions Last Dose Informant Patient Reported? Taking? Alcohol Swabs (Alcohol Pads) 70 % PADS   No No   Sig: Use 4 (four) times a day   Blood Glucose Monitoring Suppl (FreeStyle Lite) DEIRDRE   No No   Sig: Use daily   Blood Pressure Monitor KIT   No No   Sig: Use daily to check blood pressure     Calcium 600 1500 (600 Ca) MG TABS   Yes No   Patient not taking: Reported on 2/19/2023   Cholecalciferol (Vitamin D) 50 MCG (2000 UT) tablet   No No   Sig: Take 1 tablet (2,000 Units total) by mouth daily   Patient not taking: Reported on 2/19/2023   Comfort Touch Insulin Pen Need 33G X 6 MM MISC   No No   Sig: USE TO INJECT INSULIN FOUR TIMES A DAY ( WITH MEALS AND AT BEDTIME )   Comfort Touch Plus Lancets 30G MISC   No No   Sig: USE TO TEST THE BLOOD SUGAR THREE TIMES A DAY   Diclofenac Sodium (VOLTAREN) 1 %   No No   Sig: APPLY TWO GRAMS TOPICALLY FOUR TIMES A DAY   Linzess 72 MCG CAPS   No No   Sig: TAKE ONE CAPSULE BY MOUTH ONCE DAILY   NON FORMULARY   Yes No   Sig: Medical marjuana   NovoLOG FlexPen 100 units/mL injection pen   No No   Sig: INJECT 6 UNITS SUBCUTANEOUSLY THREE TIMES A DAY WITH MEALS   Restasis 0 05 % ophthalmic emulsion   Yes No   Patient not taking: Reported on 2/19/2023   Symbicort 80-4 5 MCG/ACT inhaler   No No   Sig: INHALE 2 PUFFS BY MOUTH TWICE A DAY RINSE MOUTH AFTER USE   TiZANidine (ZANAFLEX) 4 MG capsule   No No   Sig: TAKE ONE CAPSULE BY MOUTH THREE TIMES A DAY   Patient not taking: Reported on 2/19/2023   Toujeo Max SoloStar 300 units/mL CONCENTRATED U-300 injection pen (2-unit dial)   No No   Sig: INJECT 22 UNITS SUBCUTANEOUSLY ONCE DAILY   acetaminophen (TYLENOL) 500 mg tablet   No No   Sig: Take 2 tablets (1,000 mg total) by mouth every 8 (eight) hours as needed for mild pain   al mag oxide-diphenhydramine-lidocaine viscous (MAGIC MOUTHWASH) 1:1:1 suspension   No No   Sig: Swish and spit 10 mL every 4 (four) hours as needed for mouth pain or discomfort   albuterol (2 5 mg/3 mL) 0 083 % nebulizer solution   No No   Sig: USE 1 VIAL VIA NEBULIZER EVERY 6 HOURS AS NEEDED FOR WHEEZING OR SHORTNESS OF BREATH   albuterol (PROVENTIL HFA,VENTOLIN HFA) 90 mcg/act inhaler   No No   Sig: INHALE 2 PUFFS BY MOUTH EVERY 6 HOURS AS NEEDED FOR WHEEZING OR SHORTNESS OF BREATH   amphetamine-dextroamphetamine (ADDERALL XR) 20 MG 24 hr capsule   No No   Sig: Take 1 capsule (20 mg total) by mouth 2 (two) times a day Max Daily Amount: 40 mg   amphetamine-dextroamphetamine (ADDERALL, 20MG,) 20 mg tablet   No No   Sig: Take 1 tablet (20 mg total) by mouth 2 (two) times a day Max Daily Amount: 40 mg   buPROPion (WELLBUTRIN XL) 300 mg 24 hr tablet   No No   Sig: Take 1 tablet (300 mg total) by mouth every morning   Patient not taking: Reported on 2/19/2023   calcium carbonate (OS-ALISIA) 600 MG tablet   No No   Sig: Take 1 tablet (600 mg total) by mouth daily   Patient not taking: Reported on 2/19/2023   clobetasol (TEMOVATE) 0 05 % cream   No No   Sig: APPLY TOPICALLY TO AFFECTED AREA TWICE A DAY   dicyclomine (BENTYL) 20 mg tablet   No No   Sig: Take 1 tablet (20 mg total) by mouth in the morning and 1 tablet (20 mg total) in the evening  Do all this for 7 days  glucose blood (FREESTYLE LITE) test strip   No No   Sig: USE TO TEST THE BLOOD SUGAR THREE TIMES A DAY AS DIRECTED   guaiFENesin (ROBITUSSIN) 100 MG/5ML oral liquid   No No   Sig: Take 10 mL (200 mg total) by mouth 3 (three) times a day as needed for cough or congestion   haloperidol (HALDOL) 5 mg tablet   No No   Sig: TAKE ONE TABLET BY MOUTH ONCE DAILY AS NEEDED FOR ABDOMINAL PAIN / NAUSEA   hydrocortisone 2 5 % cream   No No   Sig: Apply topically 2 (two) times a day as needed for rash   lidocaine (LMX) 4 % cream   No No   Sig: Apply topically as needed for mild pain   lidocaine (XYLOCAINE) 5 % ointment   No No   Sig: APPLY TOPICALLY 2GM TWICE A DAY TO AFFECTED AREAS AS NEEDED FOR MILD PAIN   Patient not taking: Reported on 3/28/2023   methylPREDNISolone 4 MG tablet therapy pack   No No   Sig: Use as directed on package   Patient not taking: Reported on 3/27/2023   naloxone (Narcan) 4 mg/0 1 mL nasal spray   No No   Sig: ADMINISTER 1 SPRAY IN ONE NOSTRIL IF NO REPONSE AFTER 2-3 MINUTES SPRAY INTO OTHER NOSTRIL WITH NEW spray   nortriptyline (PAMELOR) 10 mg capsule   No No   Sig: Take 1 capsule (10 mg total) by mouth daily at bedtime   nystatin-triamcinolone (MYCOLOG-II) ointment   No No   Sig: Apply topically 2 (two) times a day   Patient not taking: Reported on 2/19/2023   ondansetron (Zofran ODT) 8 mg disintegrating tablet   No No   Sig: Take 1 tablet (8 mg total) by mouth every 8 (eight) hours as needed for nausea or vomiting   oxyCODONE (Roxicodone) 15 mg immediate release tablet   No No   Sig: Take 1 tablet (15 mg total) by mouth every 4 (four) hours as needed for moderate pain Max Daily Amount: 90 mg   pantoprazole (PROTONIX) 40 mg tablet   No No   Sig: Take 1 tablet (40 mg total) by mouth 2 (two) times a day before meals   Patient not taking: Reported on 2/19/2023   pregabalin (LYRICA) 100 mg capsule   No No   Sig: Take 1 capsule (100 mg total) by mouth 3 (three) times a day   Patient not taking: Reported on 2/19/2023   promethazine (PHENERGAN) 25 mg tablet   No No   Sig: Take 1 tablet (25 mg total) by mouth every 6 (six) hours as needed for nausea or vomiting   Patient not taking: Reported on 2/19/2023   scopolamine (TRANSDERM-SCOP) 1 5 mg/3 days TD 72 hr patch   No No   Sig: Place 1 patch on the skin every third day   Patient not taking: Reported on 2/19/2023   traZODone (DESYREL) 50 mg tablet   No No   Sig: Take 1 tablet (50 mg total) by mouth daily at bedtime      Facility-Administered Medications: None       Past Medical History:   Diagnosis Date    ADHD     Anemia of chronic disease     Anxiety     Arthritis ?  Asthma     Bipolar disorder [facility]     Borderline personality disorder ([facility]     Cataplexy     Chronic abdominal pain     Chronic kidney disease ?  CKD (chronic kidney disease) stage 3, GFR 30-59 ml/min (HCC)     Cushing syndrome (HCC)     Depression ?     Diabetes mellitus ([facility]     DVT (deep venous thrombosis) (HCC)     GERD (gastroesophageal reflux disease)     Headache(784 0) 3 months    History of acute pancreatitis     felt secondary to Bactrim    History of transfusion     Hypertension     Liver disease     fatty liver    Microscopic polyangiitis (HCC)     Ovarian cyst     PTSD (post-traumatic stress disorder)     Self-inflicted injury     self inflicted skin wounds    Sleep apnea     Wegener's granulomatosis with renal involvement ([facility] ) 2015       Past Surgical History:   Procedure Laterality Date    ESOPHAGOGASTRODUODENOSCOPY  09/11/2015    mild antral gastritis    GASTRIC STIMULATOR IMPLANT SURGERY  06/25/2020    TN COLONOSCOPY FLX DX W/COLLJ SPEC WHEN PFRMD N/A 12/14/2018    adenoma removed from the transverse, hyperplastic polyp removed from the left colon    TN ESOPHAGOGASTRODUODENOSCOPY TRANSORAL DIAGNOSTIC N/A 12/14/2018    gastritis and scant coffee-ground material   Biopsies negative for H  pylori    TN OPEN TX RADIAL&ULNAR SHAFT FX W/FIXJ RADIUS&ULNA Left 2022    Procedure: OPEN REDUCTION W/ INTERNAL FIXATION (ORIF) RADIUS / ULNA (WRIST); Surgeon: Kamlesh Acosta MD;  [facility]: [facility];  Service: Orthopedics    RELEASE SCAR CONTRACTURE / GRAFT REPAIRS OF HAND Bilateral     UPPER GASTROINTESTINAL ENDOSCOPY  2019    Dr Citlaly Vincent to the pylorus       Family History   Problem Relation Age of Onset    Arthritis Mother     Depression Mother     Diabetes Mother     Mental illness Mother    Katie Signs Migraines Mother     No Known Problems Father     Colon cancer Neg Hx     Drug abuse Neg Hx         mother father    Mental illness Neg Hx         disorder, mother father    Cancer Neg Hx     Breast cancer Neg Hx      I have reviewed and agree with the history as documented  E-Cigarette/Vaping    E-Cigarette Use Never User      E-Cigarette/Vaping Substances    Nicotine No     THC No     CBD No     Flavoring No     Other No     Unknown No      Social History     Tobacco Use    Smoking status: Former     Packs/day:      Years: 10 00     Pack years: 10 00     Types: Cigarettes     Quit date: 2011     Years since quittin 3    Smokeless tobacco: Never    Tobacco comments:     Stopped smoking 11 years ago   Vaping Use    Vaping Use: Never used   Substance Use Topics    Alcohol use: Never    Drug use: Yes     Types: Marijuana     Comment: marijuana daily       Review of Systems   Constitutional: Negative for chills and fever  Respiratory: Negative for shortness of breath  Cardiovascular: Negative for chest pain  Gastrointestinal: Negative for nausea and vomiting  Skin: Positive for wound (left 2nd and 3rd digit )  Neurological: Negative for dizziness and headaches  All other systems reviewed and are negative  Physical Exam  Physical Exam  Vitals and nursing note reviewed  Constitutional:       General: She is not in acute distress  Appearance: She is well-developed     HENT:      Head: Normocephalic  Right Ear: External ear normal       Left Ear: External ear normal    Eyes:      Pupils: Pupils are equal, round, and reactive to light  Cardiovascular:      Rate and Rhythm: Normal rate and regular rhythm  Pulses: Normal pulses  Pulmonary:      Effort: Pulmonary effort is normal  No respiratory distress  Breath sounds: Normal breath sounds  Abdominal:      Palpations: Abdomen is soft  Musculoskeletal:      Cervical back: Normal range of motion  Skin:     General: Skin is warm and dry  Capillary Refill: Capillary refill takes less than 2 seconds  Comments: Left 2nd digit DIP 1 cm laceration  Left 3rd digit DIP 0 5 cm avulsion   Neurological:      Mental Status: She is alert and oriented to person, place, and time  Mental status is at baseline     Psychiatric:         Mood and Affect: Mood normal          Vital Signs  ED Triage Vitals [05/05/23 1620]   Temperature Pulse Respirations Blood Pressure SpO2   98 1 °F (36 7 °C) 82 18 119/80 97 %      Temp Source Heart Rate Source Patient Position - Orthostatic VS BP Location FiO2 (%)   Oral Monitor Sitting Right arm --      Pain Score       2           Vitals:    05/05/23 1620   BP: 119/80   Pulse: 82   Patient Position - Orthostatic VS: Sitting         Visual Acuity      ED Medications  Medications   tetanus-diphtheria-acellular pertussis (BOOSTRIX) IM injection 0 5 mL (0 5 mL Intramuscular Given 5/5/23 1728)       Diagnostic Studies  Results Reviewed     None                 No orders to display              Procedures  Splint application    Date/Time: 5/5/2023 5:34 PM  Performed by: PRASANTH Streeter  Authorized by: PRASANTH Streeter   Universal Protocol:  Risks and benefits: risks, benefits and alternatives were discussed  Consent given by: patient  Patient understanding: patient states understanding of the procedure being performed  Patient consent: the patient's understanding of the procedure matches consent given  Patient identity confirmed: verbally with patient and arm band      Pre-procedure details:     Sensation:  Normal  Procedure details:     Laterality:  Left    Location:  Finger    Finger:  L index finger    Splint type:  Finger splint, static  Laceration repair    Date/Time: 5/5/2023 5:34 PM  Performed by: Janis Kaye  Authorized by: PRASANTH Mcmullen   Consent: Verbal consent obtained  Consent given by: patient  Patient understanding: patient states understanding of the procedure being performed  Patient consent: the patient's understanding of the procedure matches consent given  Patient identity confirmed: verbally with patient and arm band  Body area: upper extremity  Location details: left index finger  Laceration length: 1 cm      Procedure Details:  Skin closure: glue  Dressing: gauze roll, splint and non-adhesive packing strip  Patient tolerance: patient tolerated the procedure well with no immediate complications    Laceration repair    Date/Time: 5/5/2023 5:34 PM  Performed by: PRASANTH Mcmullen  Authorized by: PRASANTH Mcmullen   Risks and benefits: risks, benefits and alternatives were discussed  Consent given by: patient  Patient understanding: patient states understanding of the procedure being performed  Patient consent: the patient's understanding of the procedure matches consent given  Patient identity confirmed: verbally with patient and arm band  Body area: upper extremity  Location details: left long finger  Laceration length: 0 5 cm      Procedure Details:  Skin closure: glue (surigel, suriform)  Dressing: gauze roll and non-adhesive packing strip  Patient tolerance: patient tolerated the procedure well with no immediate complications               ED Course                               SBIRT 22yo+    Flowsheet Row Most Recent Value   Initial Alcohol Screen: US AUDIT-C     1  How often do you have a drink containing alcohol? 0 Filed at: 05/05/2023 1635   2   How many drinks containing alcohol do you have on a typical day you are drinking? 0 Filed at: 05/05/2023 1635   3b  FEMALE Any Age, or MALE 65+: How often do you have 4 or more drinks on one occassion? 0 Filed at: 05/05/2023 1635   Audit-C Score 0 Filed at: 05/05/2023 1635   BEATA: How many times in the past year have you    Used an illegal drug or used a prescription medication for non-medical reasons? Never Filed at: 05/05/2023 1635                    Medical Decision Making  79-year-old female presents to the ER after cutting her left second and third digit with a  knife approximately 15 minutes prior to arrival   Wound inspected under direct bright light with good visualization  Area with superficial laceration across soft tissue of left index finger  Patient's finger was soaked and underlying hematoma removed  Advised patient I would recommend sutures to the the left index finger, patient politely declined and preferred exflion glue  Patient left index finger placed in splint so glue can allow skin to heal without movement and breaking the glue open  Left middle finger Surgicel and Surgifoam placed on for avulsion  Tetanus updated  neurovascularly intact post repair  wound care discussed with patient and caregiver  Advised to take over-the-counter pain medication as needed  Follow-up with primary care doctor  Referral to hand surgery given as needed  Return the ER symptoms worsens  or signs of infections arise at home  Risk  Prescription drug management            Disposition  Final diagnoses:   Finger laceration     Time reflects when diagnosis was documented in both MDM as applicable and the Disposition within this note     Time User Action Codes Description Comment    5/5/2023  5:32 PM Analy Corral [F22 [id number]] Finger laceration       ED Disposition     ED Disposition   Discharge    Condition   Stable    Date/Time   Fri May 5, 2023  5:32 PM    Comment   Faustina Nicely discharge to home/self care                Follow-up Information     Follow up With Specialties Details Why [address] [facility]   [address]  [address]      Manjeet Bangura [address] [address]  [phone]            Discharge Medication List as of 5/5/2023  5:34 PM      CONTINUE these medications which have NOT CHANGED    Details   oxyCODONE (Roxicodone) 15 mg immediate release tablet Take 1 tablet (15 mg total) by mouth every 4 (four) hours as needed for moderate pain Max Daily Amount: 90 mg, Starting Fri 4/14/2023, Normal      acetaminophen (TYLENOL) 500 mg tablet Take 2 tablets (1,000 mg total) by mouth every 8 (eight) hours as needed for mild pain, Starting Mon 2/28/2022, Normal      al mag oxide-diphenhydramine-lidocaine viscous (MAGIC MOUTHWASH) 1:1:1 suspension Swish and spit 10 mL every 4 (four) hours as needed for mouth pain or discomfort, Starting Fri 9/2/2022, Normal      albuterol (2 5 mg/3 mL) 0 083 % nebulizer solution USE 1 VIAL VIA NEBULIZER EVERY 6 HOURS AS NEEDED FOR WHEEZING OR SHORTNESS OF BREATH, Normal      albuterol (PROVENTIL HFA,VENTOLIN HFA) 90 mcg/act inhaler INHALE 2 PUFFS BY MOUTH EVERY 6 HOURS AS NEEDED FOR WHEEZING OR SHORTNESS OF BREATH, Normal      Alcohol Swabs (Alcohol Pads) 70 % PADS Use 4 (four) times a day, Starting Wed 12/1/2021, Normal      amphetamine-dextroamphetamine (ADDERALL XR) 20 MG 24 hr capsule Take 1 capsule (20 mg total) by mouth 2 (two) times a day Max Daily Amount: 40 mg, Starting Thu 4/6/2023, Normal      amphetamine-dextroamphetamine (ADDERALL, 20MG,) 20 mg tablet Take 1 tablet (20 mg total) by mouth 2 (two) times a day Max Daily Amount: 40 mg, Starting Tue 5/2/2023, Normal      Blood Glucose Monitoring Suppl (FreeStyle Lite) DEIRDRE Use daily, Starting Wed 9/21/2022, Normal      Blood Pressure Monitor KIT Use daily to check blood pressure , Normal buPROPion (WELLBUTRIN XL) 300 mg 24 hr tablet Take 1 tablet (300 mg total) by mouth every morning, Starting Sat 2/11/2023, Normal      !! Calcium 600 1500 (600 Ca) MG TABS Starting Tue 12/20/2022, Historical Med      !! calcium carbonate (OS-ALISIA) 600 MG tablet Take 1 tablet (600 mg total) by mouth daily, Starting Fri 9/2/2022, Normal      Cholecalciferol (Vitamin D) 50 MCG (2000 UT) tablet Take 1 tablet (2,000 Units total) by mouth daily, Starting Fri 9/2/2022, Normal      clobetasol (TEMOVATE) 0 05 % cream APPLY TOPICALLY TO AFFECTED AREA TWICE A DAY, Normal      Comfort Touch Insulin Pen Need 33G X 6 MM MISC USE TO INJECT INSULIN FOUR TIMES A DAY ( WITH MEALS AND AT BEDTIME ), Normal      Comfort Touch Plus Lancets 30G MISC USE TO TEST THE BLOOD SUGAR THREE TIMES A DAY, Normal      Diclofenac Sodium (VOLTAREN) 1 % APPLY TWO GRAMS TOPICALLY FOUR TIMES A DAY, Normal      dicyclomine (BENTYL) 20 mg tablet Take 1 tablet (20 mg total) by mouth in the morning and 1 tablet (20 mg total) in the evening  Do all this for 7 days  , Starting Thu 5/12/2022, Until Tue 3/28/2023, Normal      glucose blood (FREESTYLE LITE) test strip USE TO TEST THE BLOOD SUGAR THREE TIMES A DAY AS DIRECTED, Normal      guaiFENesin (ROBITUSSIN) 100 MG/5ML oral liquid Take 10 mL (200 mg total) by mouth 3 (three) times a day as needed for cough or congestion, Starting Fri 12/30/2022, Normal      haloperidol (HALDOL) 5 mg tablet TAKE ONE TABLET BY MOUTH ONCE DAILY AS NEEDED FOR ABDOMINAL PAIN / NAUSEA, Normal      hydrocortisone 2 5 % cream Apply topically 2 (two) times a day as needed for rash, Starting Wed 10/26/2022, Normal      lidocaine (LMX) 4 % cream Apply topically as needed for mild pain, Starting Tue 2/28/2023, Normal      lidocaine (XYLOCAINE) 5 % ointment APPLY TOPICALLY 2GM TWICE A DAY TO AFFECTED AREAS AS NEEDED FOR MILD PAIN, Normal      Linzess 72 MCG CAPS TAKE ONE CAPSULE BY MOUTH ONCE DAILY, Normal      methylPREDNISolone 4 MG tablet therapy pack Use as directed on package, Normal      naloxone (Narcan) 4 mg/0 1 mL nasal spray ADMINISTER 1 SPRAY IN ONE NOSTRIL IF NO REPONSE AFTER 2-3 MINUTES SPRAY INTO OTHER NOSTRIL WITH NEW spray, Normal      NON FORMULARY Medical [facility], Historical Med      nortriptyline (PAMELOR) 10 mg capsule Take 1 capsule (10 mg total) by mouth daily at bedtime, Starting Wed 4/12/2023, Normal      NovoLOG FlexPen 100 units/mL injection pen INJECT 6 UNITS SUBCUTANEOUSLY THREE TIMES A DAY WITH MEALS, Normal      nystatin-triamcinolone (MYCOLOG-II) ointment Apply topically 2 (two) times a day, Starting Wed 10/26/2022, Normal      ondansetron (Zofran ODT) 8 mg disintegrating tablet Take 1 tablet (8 mg total) by mouth every 8 (eight) hours as needed for nausea or vomiting, Starting Mon 3/27/2023, Normal      pantoprazole (PROTONIX) 40 mg tablet Take 1 tablet (40 mg total) by mouth 2 (two) times a day before meals, Starting Tue 1/18/2022, Normal      pregabalin (LYRICA) 100 mg capsule Take 1 capsule (100 mg total) by mouth 3 (three) times a day, Starting Thu 7/21/2022, Normal      promethazine (PHENERGAN) 25 mg tablet Take 1 tablet (25 mg total) by mouth every 6 (six) hours as needed for nausea or vomiting, Starting Mon 5/10/2021, Normal      Restasis 0 05 % ophthalmic emulsion Starting Wed 12/29/2021, Historical Med      scopolamine (TRANSDERM-SCOP) 1 5 mg/3 days TD 72 hr patch Place 1 patch on the skin every third day, Starting Tue 4/27/2021, Normal      Symbicort 80-4 5 MCG/ACT inhaler INHALE 2 PUFFS BY MOUTH TWICE A DAY RINSE MOUTH AFTER USE, Normal      TiZANidine (ZANAFLEX) 4 MG capsule TAKE ONE CAPSULE BY MOUTH THREE TIMES A DAY, Normal      Toujeo Max SoloStar 300 units/mL CONCENTRATED U-300 injection pen (2-unit dial) INJECT 22 UNITS SUBCUTANEOUSLY ONCE DAILY, Normal      traZODone (DESYREL) 50 mg tablet Take 1 tablet (50 mg total) by mouth daily at bedtime, Starting Tue 4/11/2023, Normal       !! - Potential duplicate medications found  Please discuss with provider  No discharge procedures on file      PDMP Review       Value Time User    PDMP Reviewed  Yes 5/2/2023 12:41 PM Katarzyna Mireles PA-C          ED Provider  Electronically Signed by           PRASANTH Sandra  05/05/23 7424

## 2023-05-08 ENCOUNTER — VBI (OUTPATIENT)
Dept: ADMINISTRATIVE | Facility: OTHER | Age: 53
End: 2023-05-08

## 2023-05-08 DIAGNOSIS — Z71.89 COMPLEX CARE COORDINATION: Primary | ICD-10-CM

## 2023-05-08 NOTE — TELEPHONE ENCOUNTER
Primitivo Holley    ED Visit Information     Ed visit date: 5/5/23  Diagnosis Description: Finger laceration  In Network? Yes [facility]  Discharge status: Home  Discharged with meds ? No  Number of ED visits to date: 3  ED Severity:4     Outreach Information    Outreach successful: Yes 1  Date letter mailed:0  Date Finalized:5/8/23    Care Coordination    Follow up appointment with pcp: yes 5/11/23  Transportation issues ? No     Value [employer] type: 7 Day Outreach  Emergent necessity warranted by diagnosis: Yes  [facility] PCP: Yes  Transportation: Friend/Family Transport  Called PCP first?: No  Feels able to call PCP for urgent problems ?: Yes  Understands what emergencies can be handled by PCP ?: Yes  Ever any problems getting appointment with PCP for minor emergency/urgency problems?: No  Practice Contacted Patient ?: No  Pt had ED follow up with pcp/staff ?: No    Reason Patient went to ED instead of Urgent Care or PCP?: Perceived Severity of Illness  Urgent care Education?: No  5/8/23 There was a personal communication with patient regarding recent ED visit  Jonah Craven stated she called for  a follow up with PCP scheduled for this Thursday 5/11/23  Patient is aware of their nearest [facility] urgent care facility, education not given   Sent to Care mngt admission risk score 96%

## 2023-05-09 ENCOUNTER — PATIENT OUTREACH (OUTPATIENT)
Dept: FAMILY MEDICINE CLINIC | Facility: CLINIC | Age: 53
End: 2023-05-09

## 2023-05-09 NOTE — PROGRESS NOTES
HRR     I called the patient, explained my role and she declines at this time  She is aware of her appointment 5/11 at [facility] for an ED follow up  The patient has my contact information if she finds she needs help in the future  The patient did request a referral for a Nutritionist; note sent to PCP  Case is being closed  Chart reviewed

## 2023-05-11 ENCOUNTER — OFFICE VISIT (OUTPATIENT)
Dept: FAMILY MEDICINE CLINIC | Facility: CLINIC | Age: 53
End: 2023-05-11

## 2023-05-11 VITALS
DIASTOLIC BLOOD PRESSURE: 74 MMHG | SYSTOLIC BLOOD PRESSURE: 112 MMHG | WEIGHT: 149 LBS | OXYGEN SATURATION: 98 % | HEIGHT: 62 IN | TEMPERATURE: 98.1 F | RESPIRATION RATE: 16 BRPM | HEART RATE: 95 BPM | BODY MASS INDEX: 27.42 KG/M2

## 2023-05-11 DIAGNOSIS — L08.9 SKIN INFECTION: ICD-10-CM

## 2023-05-11 DIAGNOSIS — S69.92XD FINGER INJURY, LEFT, SUBSEQUENT ENCOUNTER: Primary | ICD-10-CM

## 2023-05-11 RX ORDER — FLUCONAZOLE 150 MG/1
150 TABLET ORAL ONCE
Qty: 1 TABLET | Refills: 0 | Status: SHIPPED | OUTPATIENT
Start: 2023-05-11 | End: 2023-05-11

## 2023-05-11 RX ORDER — CEPHALEXIN 500 MG/1
500 CAPSULE ORAL EVERY 12 HOURS SCHEDULED
Qty: 14 CAPSULE | Refills: 0 | Status: SHIPPED | OUTPATIENT
Start: 2023-05-11 | End: 2023-05-18

## 2023-05-11 NOTE — ASSESSMENT & PLAN NOTE
Laceration to second and third digits left hand with  knife on 5/5/23, repaired in ED with glue and foam  Mild swelling with increasing pain and erythema  No fever or drainage  Good capillary refill  Decreased sensation to second digit proximal to nail    - Keflex 500 mg BID x 7 days, diflucan x1 for prevention of yeast infection    - Reviewed red flag symptoms requiring re-evaluation

## 2023-05-11 NOTE — PROGRESS NOTES
Name: Mehran Patella      : 1970      MRN: [id number]  Encounter Provider: PRASANTH Lara  Encounter Date: 2023   Encounter department: [facility]     1  Finger injury, left, subsequent encounter  Assessment & Plan:  Laceration to second and third digits left hand with  knife on 23, repaired in ED with glue and foam  Mild swelling with increasing pain and erythema  No fever or drainage  Good capillary refill  Decreased sensation to second digit proximal to nail    - Keflex 500 mg BID x 7 days, diflucan x1 for prevention of yeast infection    - Reviewed red flag symptoms requiring re-evaluation  2  Skin infection  -     cephalexin (KEFLEX) 500 mg capsule; Take 1 capsule (500 mg total) by mouth every 12 (twelve) hours for 7 days  -     fluconazole (DIFLUCAN) 150 mg tablet; Take 1 tablet (150 mg total) by mouth once for 1 dose         Subjective     HPI     Khoi presents to the office for c/o of worsening pain and redness of two fingers left hand after injury on 23  Pt was cutting chicken when she experienced a cataplectic attack and accidentally cut herself with a  knife  Lacerations were repaired in ED and pt received Tdap vaccination  Pt reports erythema and pain have been increasing since that time  No drainage, fever, or any other symptoms  Taking Percocet for pain  Review of Systems   Constitutional: Negative for chills and fever  Respiratory: Negative  Cardiovascular: Negative  Skin: Positive for color change and wound  All other systems reviewed and are negative  Past Medical History:   Diagnosis Date   • ADHD    • Anemia of chronic disease    • Anxiety    • Arthritis ? • Asthma    • Bipolar disorder ([facility] )    • Borderline personality disorder ([facility] )    • Cataplexy    • Chronic abdominal pain    • Chronic kidney disease ?    • CKD (chronic kidney disease) stage 3, GFR 30-59 ml/min ([facility]) • Cushing syndrome ([facility] )    • Depression ? • Diabetes mellitus ([place] [address] )    • DVT (deep venous thrombosis) (HCC)    • GERD (gastroesophageal reflux disease)    • Headache(784 0) 3 months   • History of acute pancreatitis     felt secondary to Bactrim   • History of transfusion    • Hypertension    • Liver disease     fatty liver   • Microscopic polyangiitis (HCC)    • Ovarian cyst    • PTSD (post-traumatic stress disorder)    • Self-inflicted injury     self inflicted skin wounds   • Sleep apnea    • Wegener's granulomatosis with renal involvement ([place] [address] ) 2015     Past Surgical History:   Procedure Laterality Date   • ESOPHAGOGASTRODUODENOSCOPY  09/11/2015    mild antral gastritis   • GASTRIC STIMULATOR IMPLANT SURGERY  06/25/2020   • WI COLONOSCOPY FLX DX W/COLLJ SPEC WHEN PFRMD N/A 12/14/2018    adenoma removed from the transverse, hyperplastic polyp removed from the left colon   • WI ESOPHAGOGASTRODUODENOSCOPY TRANSORAL DIAGNOSTIC N/A 12/14/2018    gastritis and scant coffee-ground material   Biopsies negative for H  pylori   • WI OPEN TX RADIAL&ULNAR SHAFT FX W/FIXJ RADIUS&ULNA Left 6/23/2022    Procedure: OPEN REDUCTION W/ INTERNAL FIXATION (ORIF) RADIUS / ULNA ([facility]); Surgeon: Cesar Becerril MD;  Location: [facility];  Service: Orthopedics   • RELEASE SCAR CONTRACTURE / GRAFT REPAIRS OF HAND Bilateral    • UPPER GASTROINTESTINAL ENDOSCOPY  12/26/2019    Dr Ej Daniel   Botox to the pylorus     Family History   Problem Relation Age of Onset   • Arthritis Mother    • Depression Mother    • Diabetes Mother    • Mental illness Mother    • Migraines Mother    • No Known Problems Father    • Colon cancer Neg Hx    • Drug abuse Neg Hx         mother father   • Mental illness Neg Hx         disorder, mother father   • Cancer Neg Hx    • Breast cancer Neg Hx      Social History     Socioeconomic History   • Marital status: Single     Spouse name: None   • Number of children: None   • Years of education: None   • Highest education level: None   Occupational History   • Occupation: disability   Tobacco Use   • Smoking status: Former     Packs/day: 1 00     Years: 10 00     Pack years: 10 00     Types: Cigarettes     Quit date: 2011     Years since quittin 3   • Smokeless tobacco: Never   • Tobacco comments:     Stopped smoking 11 years ago   Vaping Use   • Vaping Use: Never used   Substance and Sexual Activity   • Alcohol use: Never   • Drug use: Yes     Types: Marijuana     Comment: marijuana daily   • Sexual activity: Not Currently     Partners: Male     Birth control/protection: None   Other Topics Concern   • None   Social History Narrative    Social hx reviewed     No pref on Quaker beliefs     Daily caffeine consumption 1 serving/day     Social Determinants of Health     Financial Resource Strain: Low Risk    • Difficulty of Paying Living Expenses: Not very hard   Food Insecurity: No Food Insecurity   • Worried About Running Out of Food in the Last Year: Never true   • Ran Out of Food in the Last Year: Never true   Transportation Needs: No Transportation Needs   • Lack of Transportation (Medical): No   • Lack of Transportation (Non-Medical):  No   Physical Activity: Not on file   Stress: Not on file   Social Connections: Not on file   Intimate Partner Violence: Not on file   Housing Stability: Low Risk    • Unable to Pay for Housing in the Last Year: No   • Number of Places Lived in the Last Year: 1   • Unstable Housing in the Last Year: No     Current Outpatient Medications on File Prior to Visit   Medication Sig   • acetaminophen (TYLENOL) 500 mg tablet Take 2 tablets (1,000 mg total) by mouth every 8 (eight) hours as needed for mild pain   • al mag oxide-diphenhydramine-lidocaine viscous (MAGIC MOUTHWASH) 1:1:1 suspension Swish and spit 10 mL every 4 (four) hours as needed for mouth pain or discomfort   • albuterol (2 5 mg/3 mL) 0 083 % nebulizer solution USE 1 VIAL VIA NEBULIZER EVERY 6 HOURS AS NEEDED FOR WHEEZING OR SHORTNESS OF BREATH   • albuterol (PROVENTIL HFA,VENTOLIN HFA) 90 mcg/act inhaler INHALE 2 PUFFS BY MOUTH EVERY 6 HOURS AS NEEDED FOR WHEEZING OR SHORTNESS OF BREATH   • Alcohol Swabs (Alcohol Pads) 70 % PADS Use 4 (four) times a day   • amphetamine-dextroamphetamine (ADDERALL XR) 20 MG 24 hr capsule Take 1 capsule (20 mg total) by mouth 2 (two) times a day Max Daily Amount: 40 mg   • amphetamine-dextroamphetamine (ADDERALL, 20MG,) 20 mg tablet Take 1 tablet (20 mg total) by mouth 2 (two) times a day Max Daily Amount: 40 mg   • Blood Glucose Monitoring Suppl (FreeStyle Lite) DEIRDRE Use daily   • Blood Pressure Monitor KIT Use daily to check blood pressure  • buPROPion (WELLBUTRIN XL) 300 mg 24 hr tablet Take 1 tablet (300 mg total) by mouth every morning (Patient not taking: Reported on 2/19/2023)   • Calcium 600 1500 (600 Ca) MG TABS  (Patient not taking: Reported on 2/19/2023)   • calcium carbonate (OS-ALISIA) 600 MG tablet Take 1 tablet (600 mg total) by mouth daily (Patient not taking: Reported on 2/19/2023)   • Cholecalciferol (Vitamin D) 50 MCG (2000 UT) tablet Take 1 tablet (2,000 Units total) by mouth daily (Patient not taking: Reported on 2/19/2023)   • clobetasol (TEMOVATE) 0 05 % cream APPLY TOPICALLY TO AFFECTED AREA TWICE A DAY   • Comfort Touch Insulin Pen Need 33G X 6 MM MISC USE TO INJECT INSULIN FOUR TIMES A DAY ( WITH MEALS AND AT BEDTIME )   • Comfort Touch Plus Lancets 30G MISC USE TO TEST THE BLOOD SUGAR THREE TIMES A DAY   • Diclofenac Sodium (VOLTAREN) 1 % APPLY TWO GRAMS TOPICALLY FOUR TIMES A DAY   • dicyclomine (BENTYL) 20 mg tablet Take 1 tablet (20 mg total) by mouth in the morning and 1 tablet (20 mg total) in the evening  Do all this for 7 days     • glucose blood (FREESTYLE LITE) test strip USE TO TEST THE BLOOD SUGAR THREE TIMES A DAY AS DIRECTED   • guaiFENesin (ROBITUSSIN) 100 MG/5ML oral liquid Take 10 mL (200 mg total) by mouth 3 (three) times a day as needed for cough or congestion   • haloperidol (HALDOL) 5 mg tablet TAKE ONE TABLET BY MOUTH ONCE DAILY AS NEEDED FOR ABDOMINAL PAIN / NAUSEA   • hydrocortisone 2 5 % cream Apply topically 2 (two) times a day as needed for rash   • lidocaine (LMX) 4 % cream Apply topically as needed for mild pain   • lidocaine (XYLOCAINE) 5 % ointment APPLY TOPICALLY 2GM TWICE A DAY TO AFFECTED AREAS AS NEEDED FOR MILD PAIN (Patient not taking: Reported on 3/28/2023)   • Linzess 72 MCG CAPS TAKE ONE CAPSULE BY MOUTH ONCE DAILY   • methylPREDNISolone 4 MG tablet therapy pack Use as directed on package (Patient not taking: Reported on 3/27/2023)   • naloxone (Narcan) 4 mg/0 1 mL nasal spray ADMINISTER 1 SPRAY IN ONE NOSTRIL IF NO REPONSE AFTER 2-3 MINUTES SPRAY INTO OTHER NOSTRIL WITH NEW spray   • NON FORMULARY [facility]   • nortriptyline (PAMELOR) 10 mg capsule Take 1 capsule (10 mg total) by mouth daily at bedtime   • NovoLOG FlexPen 100 units/mL injection pen INJECT 6 UNITS SUBCUTANEOUSLY THREE TIMES A DAY WITH MEALS   • nystatin-triamcinolone (MYCOLOG-II) ointment Apply topically 2 (two) times a day (Patient not taking: Reported on 2/19/2023)   • ondansetron (Zofran ODT) 8 mg disintegrating tablet Take 1 tablet (8 mg total) by mouth every 8 (eight) hours as needed for nausea or vomiting   • oxyCODONE (Roxicodone) 15 mg immediate release tablet Take 1 tablet (15 mg total) by mouth every 4 (four) hours as needed for moderate pain Max Daily Amount: 90 mg   • pantoprazole (PROTONIX) 40 mg tablet Take 1 tablet (40 mg total) by mouth 2 (two) times a day before meals (Patient not taking: Reported on 2/19/2023)   • pregabalin (LYRICA) 100 mg capsule Take 1 capsule (100 mg total) by mouth 3 (three) times a day (Patient not taking: Reported on 2/19/2023)   • promethazine (PHENERGAN) 25 mg tablet Take 1 tablet (25 mg total) by mouth every 6 (six) hours as needed for nausea or vomiting (Patient not taking: Reported on 2/19/2023)   • Restasis 0 05 % "ophthalmic emulsion  (Patient not taking: Reported on 2/19/2023)   • scopolamine (TRANSDERM-SCOP) 1 5 mg/3 days TD 72 hr patch Place 1 patch on the skin every third day (Patient not taking: Reported on 2/19/2023)   • Symbicort 80-4 5 MCG/ACT inhaler INHALE 2 PUFFS BY MOUTH TWICE A DAY RINSE MOUTH AFTER USE   • TiZANidine (ZANAFLEX) 4 MG capsule TAKE ONE CAPSULE BY MOUTH THREE TIMES A DAY (Patient not taking: Reported on 2/19/2023)   • Toujeo Max SoloStar 300 units/mL CONCENTRATED U-300 injection pen (2-unit dial) INJECT 22 UNITS SUBCUTANEOUSLY ONCE DAILY   • traZODone (DESYREL) 50 mg tablet Take 1 tablet (50 mg total) by mouth daily at bedtime     Allergies   Allergen Reactions   • Prozac [Fluoxetine Hcl]      SI   • Bactrim [Sulfamethoxazole-Trimethoprim]      Pt \"They think that is what cause the pancreatitis\"    • Flagyl [Metronidazole] Diarrhea and Abdominal Pain   • Lamictal [Lamotrigine] GI Intolerance   • Lithium Other (See Comments)   • Ibuprofen    • Lexapro [Escitalopram Oxalate] Rash   • Navane [Thiothixene]      SI   • Other      \"novaine? \" antipsychotic     Immunization History   Administered Date(s) Administered   • INFLUENZA 10/13/2008, 12/23/2010, 10/27/2011   • Pneumococcal Conjugate 13-Valent 05/12/2015   • Pneumococcal Polysaccharide PPV23 10/13/2008, 09/17/2015   • Rabies 09/27/2007   • Tdap 05/05/2023       Objective     /74 (BP Location: Left arm, Patient Position: Sitting, Cuff Size: Standard)   Pulse 95   Temp 98 1 °F (36 7 °C) (Temporal)   Resp 16   Ht 5' 2\" (1 575 m)   Wt 67 6 kg (149 lb)   LMP  (LMP Unknown)   SpO2 98%   BMI 27 25 kg/m²     Physical Exam  Vitals reviewed  Constitutional:       General: She is not in acute distress  Appearance: She is overweight  She is not ill-appearing or diaphoretic  HENT:      Head: Normocephalic and atraumatic  Cardiovascular:      Rate and Rhythm: Normal rate and regular rhythm  Pulses: Normal pulses        Heart sounds: " Normal heart sounds  No murmur heard  Pulmonary:      Effort: Pulmonary effort is normal  No tachypnea  Breath sounds: Normal breath sounds  No decreased breath sounds or wheezing  Musculoskeletal:      Comments: Patchy discoloration/scarring bilateral hands  Left hand second and third digits TTP with swelling and erythema surrounding scabbed, healing lacerations  No drainage  Good capillary refill  Decreased sensation to second digit proximal to nail  Pt unwilling perform ROM due to location of lacerations crossing joints  Neurological:      Mental Status: She is alert         PRASANTH Ferraro

## 2023-05-12 DIAGNOSIS — G89.4 CHRONIC PAIN SYNDROME: ICD-10-CM

## 2023-05-12 DIAGNOSIS — M31.31 GRANULOMATOSIS WITH POLYANGIITIS WITH RENAL INVOLVEMENT (HCC): ICD-10-CM

## 2023-05-16 RX ORDER — OXYCODONE HYDROCHLORIDE 15 MG/1
15 TABLET ORAL EVERY 4 HOURS PRN
Qty: 180 TABLET | Refills: 0 | Status: SHIPPED | OUTPATIENT
Start: 2023-05-16

## 2023-05-23 DIAGNOSIS — G89.4 CHRONIC PAIN SYNDROME: ICD-10-CM

## 2023-05-23 RX ORDER — LIDOCAINE 50 MG/G
OINTMENT TOPICAL 2 TIMES DAILY PRN
Qty: 240 G | Refills: 8 | Status: SHIPPED | OUTPATIENT
Start: 2023-05-23

## 2023-05-25 ENCOUNTER — OFFICE VISIT (OUTPATIENT)
Dept: PAIN MEDICINE | Facility: MEDICAL CENTER | Age: 53
End: 2023-05-25

## 2023-05-25 VITALS
SYSTOLIC BLOOD PRESSURE: 121 MMHG | HEIGHT: 62 IN | WEIGHT: 149 LBS | HEART RATE: 80 BPM | DIASTOLIC BLOOD PRESSURE: 79 MMHG | BODY MASS INDEX: 27.42 KG/M2

## 2023-05-25 DIAGNOSIS — M47.816 LUMBAR SPONDYLOSIS: ICD-10-CM

## 2023-05-25 DIAGNOSIS — M79.18 MYOFASCIAL PAIN SYNDROME: ICD-10-CM

## 2023-05-25 DIAGNOSIS — M54.16 LUMBAR RADICULOPATHY: ICD-10-CM

## 2023-05-25 DIAGNOSIS — M54.12 CERVICAL RADICULOPATHY: Primary | ICD-10-CM

## 2023-05-25 DIAGNOSIS — M46.1 BILATERAL SACROILIITIS (HCC): ICD-10-CM

## 2023-05-25 DIAGNOSIS — M54.2 NECK PAIN: ICD-10-CM

## 2023-05-25 RX ORDER — NORTRIPTYLINE HYDROCHLORIDE 25 MG/1
25 CAPSULE ORAL
Qty: 30 CAPSULE | Refills: 2 | Status: SHIPPED | OUTPATIENT
Start: 2023-05-25

## 2023-05-25 RX ORDER — FLUCONAZOLE 150 MG/1
TABLET ORAL
COMMUNITY
Start: 2023-05-11

## 2023-05-25 NOTE — PROGRESS NOTES
Assessment:  1  Cervical radiculopathy    2  Neck pain    3  Lumbar radiculopathy    4  Bilateral sacroiliitis ([address] )    5  Myofascial pain syndrome    6  Lumbar spondylosis        Plan:  While the patient was in the office today, I did have a thorough conversation regarding their chronic pain syndrome, medication management, and treatment plan options  After discussing options I have recommended a therapeutic C7-T1 epidural steroid injection to address the radicular component of her pain pattern  This will need to be scheduled at the 3-month jorge from her previous injection which will be after July 21, 2023  We we will await the results of the EMG of the lower extremities which she is scheduled for in the near future  In the meantime we will increase the nortriptyline to 25 mg nightly  Patient states that she was prescribed trazodone but she is going to discontinue it  Complete risks and benefits including bleeding, infection, tissue reaction, nerve injury and allergic reaction were discussed  The approach was demonstrated using models and literature was provided  Verbal and written consent was obtained  My impressions and treatment recommendations were discussed in detail with the patient who verbalized understanding and had no further questions  Discharge instructions were provided  I personally saw and examined the patient and I agree with the above discussed plan of care  Orders Placed This Encounter   Procedures   • FL spine and pain procedure     Schedule after 7/21/23     Standing Status:   Future     Standing Expiration Date:   5/25/2027     Order Specific Question:   Reason for Exam:     Answer:   C7-T1 YANNI     Order Specific Question:   Is the patient pregnant? Answer:   No     Order Specific Question:   Anticoagulant hold needed?      Answer:   no     New Medications Ordered This Visit   Medications   • fluconazole (DIFLUCAN) 150 mg tablet   • nortriptyline (PAMELOR) 25 mg capsule     Sig: Take 1 capsule (25 mg total) by mouth daily at bedtime     Dispense:  30 capsule     Refill:  2       History of Present Illness:  Albertine Claude is a 46 y o  female who presents for a follow up office visit in regards to Neck Pain (F/U )  The patient’s current symptoms include chronic neck and low back pain that she presently rates a 4 out of 10 on the pain scale and describes it as a constant burning, sharp, throbbing, pressure-like and shooting pain  The pain of the back of her neck is bilateral and radiates into the upper extremities with intermittent numbness and tingling  On 4/21/2023 she underwent a C7-T1 interlaminar epidural steroid injection and she reports 50% relief which is still ongoing  This injection typically provides her relief for approximately 3 months followed by a return of the same pain  She is concerned about her increasing leg symptoms; is scheduled for an EMG in near future  Patient is not interested in repeating injection for her low back at this time  I have personally reviewed and/or updated the patient's past medical history, past surgical history, family history, social history, current medications, allergies, and vital signs today  Review of Systems   Constitutional: Negative for chills and fever  HENT: Negative for ear pain and sore throat  Eyes: Negative for pain and visual disturbance  Respiratory: Negative for cough and shortness of breath  Cardiovascular: Negative for chest pain and palpitations  Gastrointestinal: Negative for abdominal pain and vomiting  Genitourinary: Negative for dysuria and hematuria  Musculoskeletal: Positive for back pain, neck pain and neck stiffness  Negative for arthralgias  Skin: Negative for color change and rash  Neurological: Positive for dizziness, weakness, numbness and headaches  Negative for seizures and syncope  Psychiatric/Behavioral: Positive for sleep disturbance     All other systems reviewed and are negative        Patient Active Problem List   Diagnosis   • Type 2 diabetes mellitus with stage 3 chronic kidney disease, with long-term current use of insulin ([facility] )   • Dyslipidemia   • Granulomatosis with polyangiitis ([facility])   • MPA (microscopic polyangiitis) ([facility])   • Asthma   • Benign essential HTN   • Chronic right shoulder pain   • Noncompliance   • Bipolar 1 disorder ([facility])   • Epigastric pain   • Pancreatitis   • Ovarian cyst   • Postherpetic neuralgia   • Anemia of chronic disease   • Arthralgia of multiple joints   • Cataplexy   • Weakness of both lower extremities   • Chronic pelvic pain in female   • Plantar wart, right foot   • Seasonal allergic rhinitis due to pollen   • Gastroesophageal reflux disease   • Weakness of both upper extremities   • Persistent proteinuria   • Left leg pain   • Controlled substance agreement signed   • Chronic narcotic use   • Small fiber neuropathy   • IBS (irritable bowel syndrome)   • Leukocytosis   • Gastroparesis   • Hyperosmia   • Smell disturbance   • Contusion of left hip   • Trochanteric bursitis of left hip   • Neuropathy   • Attention deficit hyperactivity disorder (ADHD)   • Elevated blood pressure reading   • Costochondritis   • Hearing difficulty of both ears   • Vaginal atrophy   • Alpha-hemolytic Streptococcus positive urine culture   • Schizo-affective schizophrenia ([facility])   • 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[facility] )   • Cervical radiculopathy   • Finger numbness   • Fall at home, initial encounter   • Bradycardia   • Marijuana use   • Difficulty ventilating with mask   • Chronic bilateral low back pain without sciatica   • Lump of skin of back   • Cervical disc disorder with radiculopathy of mid-cervical region   • Neck pain   • Cervical spinal stenosis   • Depression   • Continuous opioid dependence ([facility])   • Chronic kidney disease, stage 4 (severe) ([facility])   • Chronic pain   • COVID   • PTSD (post-traumatic stress disorder)   • Pain of finger of right hand   • Bilateral sacroiliitis (HCC)   • Myofascial pain syndrome   • Lumbar spondylosis   • Chronic right hip pain   • Vomiting   • Osteopenia   • Pain in finger of left hand   • Intrinsic urethral sphincter deficiency   • Urinary incontinence, mixed   • Other constipation   • Polyarthritis   • Other secondary hypertension   • Left wrist pain   • Insomnia   • Finger injury, left, subsequent encounter       Past Medical History:   Diagnosis Date   • ADHD    • Anemia of chronic disease    • Anxiety    • Arthritis ? • Asthma    • Bipolar disorder ([place] [address] )    • Borderline personality disorder ([place] [address] )    • Cataplexy    • Chronic abdominal pain    • Chronic kidney disease ? • CKD (chronic kidney disease) stage 3, GFR 30-59 ml/min (HCC)    • Cushing syndrome ([facility])    • Depression ? • Diabetes mellitus ([place] [address] )    • DVT (deep venous thrombosis) ([facility])    • GERD (gastroesophageal reflux disease)    • Headache(784 0) 3 months   • History of acute pancreatitis     felt secondary to Bactrim   • History of transfusion    • Hypertension    • Liver disease     fatty liver   • Microscopic polyangiitis ([facility])    • Ovarian cyst    • PTSD (post-traumatic stress disorder)    • Self-inflicted injury     self inflicted skin wounds   • Sleep apnea    • Wegener's granulomatosis with renal involvement ([place] [address] ) 2015       Past Surgical History:   Procedure Laterality Date   • ESOPHAGOGASTRODUODENOSCOPY  09/11/2015    mild antral gastritis   • GASTRIC STIMULATOR IMPLANT SURGERY  06/25/2020   • CT COLONOSCOPY FLX DX W/COLLJ SPEC WHEN PFRMD N/A 12/14/2018    adenoma removed from the transverse, hyperplastic polyp removed from the left colon   • CT ESOPHAGOGASTRODUODENOSCOPY TRANSORAL DIAGNOSTIC N/A 12/14/2018    gastritis and scant coffee-ground material   Biopsies negative for H  pylori   • CT OPEN TX RADIAL&ULNAR SHAFT FX W/FIXJ RADIUS&ULNA Left 6/23/2022    Procedure: OPEN REDUCTION W/ INTERNAL FIXATION (ORIF) RADIUS / ULNA (WRIST);   Surgeon: Jersey Mix Britney Wick MD;  Location: [facility];  Service: Orthopedics   • RELEASE SCAR CONTRACTURE / GRAFT REPAIRS OF HAND Bilateral    • UPPER GASTROINTESTINAL ENDOSCOPY  2019    Dr Halle Vincent to the pylorus       Family History   Problem Relation Age of Onset   • Arthritis Mother    • Depression Mother    • Diabetes Mother    • Mental illness Mother    • Migraines Mother    • No Known Problems Father    • Colon cancer Neg Hx    • Drug abuse Neg Hx         mother father   • Mental illness Neg Hx         disorder, mother father   • Cancer Neg Hx    • Breast cancer Neg Hx        Social History     Occupational History   • Occupation: disability   Tobacco Use   • Smoking status: Former     Packs/day:      Years: 10 00     Total pack years: 10 00     Types: Cigarettes     Quit date: 2011     Years since quittin 4   • Smokeless tobacco: Never   • Tobacco comments:     Stopped smoking 11 years ago   Vaping Use   • Vaping Use: Never used   Substance and Sexual Activity   • Alcohol use: Never   • Drug use: Yes     Types: Marijuana     Comment: marijuana daily   • Sexual activity: Not Currently     Partners: Male     Birth control/protection: None       Current Outpatient Medications on File Prior to Visit   Medication Sig   • acetaminophen (TYLENOL) 500 mg tablet Take 2 tablets (1,000 mg total) by mouth every 8 (eight) hours as needed for mild pain   • al mag oxide-diphenhydramine-lidocaine viscous (MAGIC MOUTHWASH) 1:1:1 suspension Swish and spit 10 mL every 4 (four) hours as needed for mouth pain or discomfort   • albuterol (2 5 mg/3 mL) 0 083 % nebulizer solution USE 1 VIAL VIA NEBULIZER EVERY 6 HOURS AS NEEDED FOR WHEEZING OR SHORTNESS OF BREATH   • albuterol (PROVENTIL HFA,VENTOLIN HFA) 90 mcg/act inhaler INHALE 2 PUFFS BY MOUTH EVERY 6 HOURS AS NEEDED FOR WHEEZING OR SHORTNESS OF BREATH   • Alcohol Swabs (Alcohol Pads) 70 % PADS Use 4 (four) times a day   • amphetamine-dextroamphetamine (ADDERALL XR) 20 MG 24 hr capsule Take 1 capsule (20 mg total) by mouth 2 (two) times a day Max Daily Amount: 40 mg   • Blood Glucose Monitoring Suppl (FreeStyle Lite) DEIRDRE Use daily   • Blood Pressure Monitor KIT Use daily to check blood pressure     • clobetasol (TEMOVATE) 0 05 % cream APPLY TOPICALLY TO AFFECTED AREA TWICE A DAY   • Comfort Touch Insulin Pen Need 33G X 6 MM MISC USE TO INJECT INSULIN FOUR TIMES A DAY ( WITH MEALS AND AT BEDTIME )   • Comfort Touch Plus Lancets 30G MISC USE TO TEST THE BLOOD SUGAR THREE TIMES A DAY   • Diclofenac Sodium (VOLTAREN) 1 % APPLY TWO GRAMS TOPICALLY FOUR TIMES A DAY   • fluconazole (DIFLUCAN) 150 mg tablet    • glucose blood (FREESTYLE LITE) test strip USE TO TEST THE BLOOD SUGAR THREE TIMES A DAY AS DIRECTED   • guaiFENesin (ROBITUSSIN) 100 MG/5ML oral liquid Take 10 mL (200 mg total) by mouth 3 (three) times a day as needed for cough or congestion   • haloperidol (HALDOL) 5 mg tablet TAKE ONE TABLET BY MOUTH ONCE DAILY AS NEEDED FOR ABDOMINAL PAIN / NAUSEA   • hydrocortisone 2 5 % cream Apply topically 2 (two) times a day as needed for rash   • lidocaine (XYLOCAINE) 5 % ointment Apply topically 2 (two) times a day as needed for mild pain   • Linzess 72 MCG CAPS TAKE ONE CAPSULE BY MOUTH ONCE DAILY   • naloxone (Narcan) 4 mg/0 1 mL nasal spray ADMINISTER 1 SPRAY IN ONE NOSTRIL IF NO REPONSE AFTER 2-3 MINUTES SPRAY INTO OTHER NOSTRIL WITH NEW spray   • NON FORMULARY [facility]   • NovoLOG FlexPen 100 units/mL injection pen INJECT 6 UNITS SUBCUTANEOUSLY THREE TIMES A DAY WITH MEALS   • ondansetron (Zofran ODT) 8 mg disintegrating tablet Take 1 tablet (8 mg total) by mouth every 8 (eight) hours as needed for nausea or vomiting   • oxyCODONE (Roxicodone) 15 mg immediate release tablet Take 1 tablet (15 mg total) by mouth every 4 (four) hours as needed for moderate pain Max Daily Amount: 90 mg   • Symbicort 80-4 5 MCG/ACT inhaler INHALE 2 PUFFS BY MOUTH TWICE A DAY RINSE MOUTH AFTER USE   • Christal Juni SoloStar 300 units/mL CONCENTRATED U-300 injection pen (2-unit dial) INJECT 22 UNITS SUBCUTANEOUSLY ONCE DAILY   • traZODone (DESYREL) 50 mg tablet Take 1 tablet (50 mg total) by mouth daily at bedtime   • [DISCONTINUED] nortriptyline (PAMELOR) 10 mg capsule Take 1 capsule (10 mg total) by mouth daily at bedtime   • amphetamine-dextroamphetamine (ADDERALL, 20MG,) 20 mg tablet Take 1 tablet (20 mg total) by mouth 2 (two) times a day Max Daily Amount: 40 mg (Patient not taking: Reported on 5/25/2023)   • buPROPion (WELLBUTRIN XL) 300 mg 24 hr tablet Take 1 tablet (300 mg total) by mouth every morning (Patient not taking: Reported on 2/19/2023)   • Calcium 600 1500 (600 Ca) MG TABS  (Patient not taking: Reported on 2/19/2023)   • calcium carbonate (OS-ALISIA) 600 MG tablet Take 1 tablet (600 mg total) by mouth daily (Patient not taking: Reported on 2/19/2023)   • Cholecalciferol (Vitamin D) 50 MCG (2000 UT) tablet Take 1 tablet (2,000 Units total) by mouth daily (Patient not taking: Reported on 2/19/2023)   • dicyclomine (BENTYL) 20 mg tablet Take 1 tablet (20 mg total) by mouth in the morning and 1 tablet (20 mg total) in the evening  Do all this for 7 days     • methylPREDNISolone 4 MG tablet therapy pack Use as directed on package (Patient not taking: Reported on 3/27/2023)   • nystatin-triamcinolone (MYCOLOG-II) ointment Apply topically 2 (two) times a day (Patient not taking: Reported on 2/19/2023)   • pantoprazole (PROTONIX) 40 mg tablet Take 1 tablet (40 mg total) by mouth 2 (two) times a day before meals (Patient not taking: Reported on 2/19/2023)   • pregabalin (LYRICA) 100 mg capsule Take 1 capsule (100 mg total) by mouth 3 (three) times a day (Patient not taking: Reported on 2/19/2023)   • promethazine (PHENERGAN) 25 mg tablet Take 1 tablet (25 mg total) by mouth every 6 (six) hours as needed for nausea or vomiting (Patient not taking: Reported on 2/19/2023)   • Restasis 0 05 % ophthalmic emulsion "(Patient not taking: Reported on 2/19/2023)   • scopolamine (TRANSDERM-SCOP) 1 5 mg/3 days TD 72 hr patch Place 1 patch on the skin every third day (Patient not taking: Reported on 2/19/2023)   • TiZANidine (ZANAFLEX) 4 MG capsule TAKE ONE CAPSULE BY MOUTH THREE TIMES A DAY (Patient not taking: Reported on 2/19/2023)     No current facility-administered medications on file prior to visit  Allergies   Allergen Reactions   • Prozac [Fluoxetine Hcl]      SI   • Bactrim [Sulfamethoxazole-Trimethoprim]      Pt \"They think that is what cause the pancreatitis\"    • Flagyl [Metronidazole] Diarrhea and Abdominal Pain   • Lamictal [Lamotrigine] GI Intolerance   • Lithium Other (See Comments)   • Ibuprofen    • Lexapro [Escitalopram Oxalate] Rash   • Navane [Thiothixene]      SI   • Other      \"novaine? \" antipsychotic       Physical Exam:    /79   Pulse 80   Ht 5' 2\" (1 575 m)   Wt 67 6 kg (149 lb)   LMP 04/26/2015   BMI 27 25 kg/m²     Constitutional:normal, well developed, well nourished, alert, in no distress and non-toxic and no overt pain behavior    Eyes:anicteric  HEENT:grossly intact  Neck:supple, symmetric, trachea midline and no masses   Pulmonary:even and unlabored  Cardiovascular:No edema or pitting edema present  Skin:Normal without rashes or lesions and well hydrated  Psychiatric:Mood and affect appropriate  Neurologic:Cranial Nerves II-XII grossly intact  Musculoskeletal: pt in wheelchair     Imaging    "

## 2023-05-25 NOTE — PATIENT INSTRUCTIONS
Epidural Steroid Injection   WHAT YOU NEED TO KNOW:   What do I need to know about an epidural steroid injection (DUC)? An DUC is a procedure to inject steroid medicine into the epidural space  The epidural space is between your spinal cord and vertebrae  Steroids reduce inflammation and fluid buildup in your spine that may be causing pain  You may be given pain medicine along with the steroids  How do I prepare for an DUC? Your provider will talk to you about how to prepare for your procedure  He or she will tell you what medicines to take or not take on the day of your procedure  You may need to stop taking blood thinners or other medicines several days before your procedure  You may need to adjust any diabetes medicine you take on the day of your procedure  Steroid medicine can increase your blood sugar level  Arrange for someone to drive you home when you are discharged  What will happen during an DUC? You will be given medicine to numb the procedure area  You will be awake for the procedure, but you will not feel pain  You may also be given medicine to help you relax  Contrast liquid will be used to help your provider see the area better  Tell the provider if you have ever had an allergic reaction to contrast liquid  Your provider may place the needle into your neck area, middle of your back, or tailbone area  He or she may inject the medicine next to the nerves that are causing your pain  He or she may instead inject the medicine into a larger area of the epidural space  This helps the medicine spread to more nerves  Your provider will use a fluoroscope to help guide the needle to the right place  A fluoroscope is a type of x-ray  After the procedure, a bandage will be placed over the injection site to prevent infection  What will happen after an DUC? You will have a bandage over the injection site to prevent infection   Your provider will tell you when you can bathe and any activity guidelines  You will be able to go home  What are the risks of an DUC? You may have temporary or permanent nerve damage or paralysis  You may have bleeding or develop a serious infection, such as meningitis (swelling of the brain coverings)  An abscess may also develop  An abscess is a pus-filled area under the skin  You may need surgery to fix the abscess  You may have a seizure, anxiety, or trouble sleeping  If you are a man, you may have temporary erectile dysfunction (not able to have an erection)  CARE AGREEMENT:   You have the right to help plan your care  Learn about your health condition and how it may be treated  Discuss treatment options with your healthcare providers to decide what care you want to receive  You always have the right to refuse treatment  The above information is an  only  It is not intended as medical advice for individual conditions or treatments  Talk to your doctor, nurse or pharmacist before following any medical regimen to see if it is safe and effective for you  © Copyright [employer] 2022 Information is for End User's use only and may not be sold, redistributed or otherwise used for commercial purposes

## 2023-05-31 ENCOUNTER — TELEPHONE (OUTPATIENT)
Dept: RADIOLOGY | Facility: MEDICAL CENTER | Age: 53
End: 2023-05-31

## 2023-05-31 DIAGNOSIS — F90.9 ATTENTION DEFICIT HYPERACTIVITY DISORDER (ADHD), UNSPECIFIED ADHD TYPE: Chronic | ICD-10-CM

## 2023-05-31 NOTE — TELEPHONE ENCOUNTER
Called patient to reschedule YANNI because it was scheduled too soon  Patient wants to know can she do trigger point shots on the lower back instead    Please advise

## 2023-06-01 ENCOUNTER — TELEPHONE (OUTPATIENT)
Dept: FAMILY MEDICINE CLINIC | Facility: CLINIC | Age: 53
End: 2023-06-01

## 2023-06-01 RX ORDER — DEXTROAMPHETAMINE SACCHARATE, AMPHETAMINE ASPARTATE, DEXTROAMPHETAMINE SULFATE AND AMPHETAMINE SULFATE 5; 5; 5; 5 MG/1; MG/1; MG/1; MG/1
20 TABLET ORAL 2 TIMES DAILY
Qty: 60 TABLET | Refills: 0 | Status: SHIPPED | OUTPATIENT
Start: 2023-06-01

## 2023-06-01 NOTE — TELEPHONE ENCOUNTER
Hi, my name is Isela Smith a  for [employer]  Bhumi Madison birth is 1970 and I am calling to see if there was a letter of medical necessity that was sent out to us in reference to her VPL repairs  The only problem is that it was it's specified more on a her needing a wheelchair versus the VPL repairs  So I would need that corrected before we're able to submit that to the company who we need to so they can you know fix or do the repairs on her VPL which is the lift for her wheelchair  If someone could give me a call back [phone], I would greatly appreciate it  Thank you

## 2023-06-01 NOTE — LETTER
June 1, 2023     Patient: Felton Husain  YOB: 1970       To Whom it May Concern:     Felton Husain is under my professional care  Patient has medical history including gastroparesis, GERD, constipation, ADHD, type 2 diabetes mellitus, cataplexy, Wegener's granulomatosis, small fiber neuropathy, bipolar 1 disorder, weakness of both lower extremities  Due to these medical conditions, patient is wheelchair bound  The use of the wheelchair assists the patient to be more mobile around the home, reduces her fall risk, and allows patient to complete her ADLs while seated  The use of a cane/walker would be insufficient given she has poor endurance and limited upper body strength  Patient does have enough room in her home for wheelchair access  The patient is able to utilize the wheelchair in the home  Patient does have a caregiver available at home to assist with operating the wheelchair  Therefore, it is imperative for patient to have a proper working wheelchair  Patient's current wheelchair is broken and requires VPL repairs for the lift  If you have any questions or concerns, please don't hesitate to call                Sincerely,           Katarzyna Thrasher PA-C               CC: No Recipients

## 2023-06-01 NOTE — TELEPHONE ENCOUNTER
I made changes to the letter so hopefully it now includes what they need  If not, recommend contacting them to find out exactly what the letter needs to say  Thanks!

## 2023-06-03 DIAGNOSIS — M47.816 LUMBAR SPONDYLOSIS: ICD-10-CM

## 2023-06-03 DIAGNOSIS — M54.12 CERVICAL RADICULOPATHY: ICD-10-CM

## 2023-06-05 ENCOUNTER — HOSPITAL ENCOUNTER (OUTPATIENT)
Dept: NEUROLOGY | Facility: CLINIC | Age: 53
Discharge: HOME | End: 2023-06-05
Payer: MEDICARE

## 2023-06-05 ENCOUNTER — APPOINTMENT (OUTPATIENT)
Dept: NEUROLOGY | Facility: CLINIC | Age: 53
End: 2023-06-05
Payer: MEDICARE

## 2023-06-05 DIAGNOSIS — G62.9 NEUROPATHY: ICD-10-CM

## 2023-06-05 PROCEDURE — 95886 MUSC TEST DONE W/N TEST COMP: CPT | Performed by: PSYCHIATRY & NEUROLOGY

## 2023-06-05 PROCEDURE — 95910 NRV CNDJ TEST 7-8 STUDIES: CPT | Performed by: PSYCHIATRY & NEUROLOGY

## 2023-06-05 RX ORDER — TIZANIDINE HYDROCHLORIDE 4 MG/1
4 CAPSULE, GELATIN COATED ORAL 3 TIMES DAILY
Qty: 90 CAPSULE | Refills: 0 | Status: SHIPPED | OUTPATIENT
Start: 2023-06-05

## 2023-06-09 ENCOUNTER — TELEPHONE (OUTPATIENT)
Dept: FAMILY MEDICINE CLINIC | Facility: CLINIC | Age: 53
End: 2023-06-09

## 2023-06-09 NOTE — TELEPHONE ENCOUNTER
Yes, good morning  My name is Renetta Zavala calling to follow up on a prescription that was faxed over to Doctor Susana Boone for patient [facility] YOB: 1970  We had new motion faxed over a detailed prescription on June 1st for the doctor to sign in date for Erika's wheelchair repair  If you have any questions I can be reached at [phone]  If the prescription has been received, signed and dated, please fax it back to [phone]  If this prescription was signed by a different doctor, please make sure that signing doctor print their name and NPI number on the prescription to make it valid  Thank you  I lt a vm informing her that we did not receive the fax

## 2023-06-10 DIAGNOSIS — M31.31 GRANULOMATOSIS WITH POLYANGIITIS WITH RENAL INVOLVEMENT (HCC): ICD-10-CM

## 2023-06-10 DIAGNOSIS — G89.4 CHRONIC PAIN SYNDROME: ICD-10-CM

## 2023-06-10 RX ORDER — OXYCODONE HYDROCHLORIDE 15 MG/1
15 TABLET ORAL EVERY 4 HOURS PRN
Qty: 180 TABLET | Refills: 0 | Status: SHIPPED | OUTPATIENT
Start: 2023-06-10

## 2023-06-12 ENCOUNTER — TELEPHONE (OUTPATIENT)
Dept: FAMILY MEDICINE CLINIC | Facility: CLINIC | Age: 53
End: 2023-06-12

## 2023-06-12 DIAGNOSIS — R21 RASH: ICD-10-CM

## 2023-06-12 NOTE — TELEPHONE ENCOUNTER
PCP SIGNATURE NEEDED FOR [employer] FORM RECEIVED VIA FAX AND PLACED IN PCP FOLDER TO BE DELIVERED AT ASSIGNED TIMES      ORDER NUMER [id number]

## 2023-06-15 RX ORDER — CLOBETASOL PROPIONATE 0.5 MG/G
CREAM TOPICAL
Qty: 60 G | Refills: 0 | Status: SHIPPED | OUTPATIENT
Start: 2023-06-15

## 2023-06-16 ENCOUNTER — TELEPHONE (OUTPATIENT)
Dept: FAMILY MEDICINE CLINIC | Facility: CLINIC | Age: 53
End: 2023-06-16

## 2023-06-16 NOTE — TELEPHONE ENCOUNTER
06/16/23    PCP SIGNATURE NEEDED FOR   Juani Cruz / Denial From Insurance  FORM RECEIVED VIA FAX AND PLACED IN PCP FOLDER TO BE DELIVERED AT ASSIGNED TIMES        Date of Cervice: 04/11/23

## 2023-07-03 DIAGNOSIS — F90.9 ATTENTION DEFICIT HYPERACTIVITY DISORDER (ADHD), UNSPECIFIED ADHD TYPE: Chronic | ICD-10-CM

## 2023-07-03 RX ORDER — DEXTROAMPHETAMINE SACCHARATE, AMPHETAMINE ASPARTATE, DEXTROAMPHETAMINE SULFATE AND AMPHETAMINE SULFATE 5; 5; 5; 5 MG/1; MG/1; MG/1; MG/1
20 TABLET ORAL 2 TIMES DAILY
Qty: 60 TABLET | Refills: 0 | Status: SHIPPED | OUTPATIENT
Start: 2023-07-03

## 2023-07-05 DIAGNOSIS — Z79.4 TYPE 2 DIABETES MELLITUS WITHOUT COMPLICATION, WITH LONG-TERM CURRENT USE OF INSULIN (HCC): ICD-10-CM

## 2023-07-05 DIAGNOSIS — E11.9 TYPE 2 DIABETES MELLITUS WITHOUT COMPLICATION, WITH LONG-TERM CURRENT USE OF INSULIN (HCC): ICD-10-CM

## 2023-07-05 RX ORDER — INSULIN ASPART 100 [IU]/ML
INJECTION, SOLUTION INTRAVENOUS; SUBCUTANEOUS
Qty: 15 ML | Refills: 2 | Status: SHIPPED | OUTPATIENT
Start: 2023-07-05

## 2023-07-09 DIAGNOSIS — G89.4 CHRONIC PAIN SYNDROME: ICD-10-CM

## 2023-07-09 DIAGNOSIS — M31.31 GRANULOMATOSIS WITH POLYANGIITIS WITH RENAL INVOLVEMENT (HCC): ICD-10-CM

## 2023-07-10 DIAGNOSIS — R21 RASH: ICD-10-CM

## 2023-07-11 ENCOUNTER — OFFICE VISIT (OUTPATIENT)
Dept: FAMILY MEDICINE CLINIC | Facility: CLINIC | Age: 53
End: 2023-07-11

## 2023-07-11 VITALS
RESPIRATION RATE: 18 BRPM | OXYGEN SATURATION: 97 % | TEMPERATURE: 97.5 F | HEART RATE: 78 BPM | BODY MASS INDEX: 27.25 KG/M2 | DIASTOLIC BLOOD PRESSURE: 62 MMHG | HEIGHT: 62 IN | SYSTOLIC BLOOD PRESSURE: 102 MMHG

## 2023-07-11 DIAGNOSIS — M54.12 CERVICAL RADICULOPATHY: Primary | Chronic | ICD-10-CM

## 2023-07-11 DIAGNOSIS — N18.4 CHRONIC KIDNEY DISEASE, STAGE 4 (SEVERE) (HCC): Chronic | ICD-10-CM

## 2023-07-11 DIAGNOSIS — G89.4 CHRONIC PAIN SYNDROME: Chronic | ICD-10-CM

## 2023-07-11 DIAGNOSIS — K21.9 GASTROESOPHAGEAL REFLUX DISEASE, UNSPECIFIED WHETHER ESOPHAGITIS PRESENT: Chronic | ICD-10-CM

## 2023-07-11 DIAGNOSIS — K31.84 GASTROPARESIS: Chronic | ICD-10-CM

## 2023-07-11 DIAGNOSIS — E11.22 TYPE 2 DIABETES MELLITUS WITH STAGE 3 CHRONIC KIDNEY DISEASE, WITH LONG-TERM CURRENT USE OF INSULIN, UNSPECIFIED WHETHER STAGE 3A OR 3B CKD (HCC): Chronic | ICD-10-CM

## 2023-07-11 DIAGNOSIS — N18.30 TYPE 2 DIABETES MELLITUS WITH STAGE 3 CHRONIC KIDNEY DISEASE, WITH LONG-TERM CURRENT USE OF INSULIN, UNSPECIFIED WHETHER STAGE 3A OR 3B CKD (HCC): Chronic | ICD-10-CM

## 2023-07-11 DIAGNOSIS — Z79.4 TYPE 2 DIABETES MELLITUS WITH STAGE 3 CHRONIC KIDNEY DISEASE, WITH LONG-TERM CURRENT USE OF INSULIN, UNSPECIFIED WHETHER STAGE 3A OR 3B CKD (HCC): Chronic | ICD-10-CM

## 2023-07-11 DIAGNOSIS — J45.30 MILD PERSISTENT ASTHMA WITHOUT COMPLICATION: Chronic | ICD-10-CM

## 2023-07-11 DIAGNOSIS — F31.9 BIPOLAR 1 DISORDER (HCC): Chronic | ICD-10-CM

## 2023-07-11 DIAGNOSIS — G62.9 NEUROPATHY: Chronic | ICD-10-CM

## 2023-07-11 DIAGNOSIS — M31.7 MPA (MICROSCOPIC POLYANGIITIS) (HCC): Chronic | ICD-10-CM

## 2023-07-11 DIAGNOSIS — G47.00 INSOMNIA, UNSPECIFIED TYPE: Chronic | ICD-10-CM

## 2023-07-11 DIAGNOSIS — F90.9 ATTENTION DEFICIT HYPERACTIVITY DISORDER (ADHD), UNSPECIFIED ADHD TYPE: Chronic | ICD-10-CM

## 2023-07-11 PROCEDURE — 3078F DIAST BP <80 MM HG: CPT | Performed by: PHYSICIAN ASSISTANT

## 2023-07-11 PROCEDURE — 3074F SYST BP LT 130 MM HG: CPT | Performed by: PHYSICIAN ASSISTANT

## 2023-07-11 PROCEDURE — 99215 OFFICE O/P EST HI 40 MIN: CPT | Performed by: PHYSICIAN ASSISTANT

## 2023-07-11 RX ORDER — CLOBETASOL PROPIONATE 0.5 MG/G
CREAM TOPICAL
Qty: 60 G | Refills: 0 | Status: SHIPPED | OUTPATIENT
Start: 2023-07-11

## 2023-07-11 RX ORDER — OXYCODONE HYDROCHLORIDE 15 MG/1
15 TABLET ORAL EVERY 4 HOURS PRN
Qty: 180 TABLET | Refills: 0 | Status: SHIPPED | OUTPATIENT
Start: 2023-07-11

## 2023-07-11 RX ORDER — CYCLOBENZAPRINE HCL 10 MG
10 TABLET ORAL 3 TIMES DAILY PRN
Qty: 90 TABLET | Refills: 2 | Status: SHIPPED | OUTPATIENT
Start: 2023-07-11

## 2023-07-11 NOTE — LETTER
July 11, 2023     Patient: Shital Youssef  YOB: 1970  Date of Visit: 7/11/2023      To Whom it May Concern:    Jeremias Nailsin is under my professional care. Patient has medical history including gastroparesis, GERD, constipation, ADHD, type 2 diabetes mellitus, cataplexy, Wegener's granulomatosis, small fiber neuropathy, bipolar 1 disorder, weakness of both lower extremities. Due to these medical conditions, patient is wheelchair bound. The use of the wheelchair assists the patient to be more mobile around the home, reduces her fall risk, and allows patient to complete her ADLs while seated. The use of a cane/walker would be insufficient given she has poor endurance and limited upper body strength. Patient does have enough room in her home for wheelchair access. The patient is able to utilize the wheelchair in the home. Patient does have a caregiver available at home to assist with operating the wheelchair. Therefore, it is imperative for patient to have a proper working wheelchair and wheelchair lift. Patient's current wheelchair lift is broken and requires VPL repairs for the lift. If you have any questions or concerns, please don't hesitate to call.          Sincerely,          Katarzyna Thrasher PA-C        CC: No Recipients

## 2023-07-13 ENCOUNTER — OFFICE VISIT (OUTPATIENT)
Dept: PAIN MEDICINE | Facility: MEDICAL CENTER | Age: 53
End: 2023-07-13
Payer: MEDICARE

## 2023-07-13 VITALS — WEIGHT: 149 LBS | BODY MASS INDEX: 27.42 KG/M2 | HEIGHT: 62 IN

## 2023-07-13 DIAGNOSIS — M54.2 NECK PAIN: ICD-10-CM

## 2023-07-13 DIAGNOSIS — M54.41 CHRONIC BILATERAL LOW BACK PAIN WITH BILATERAL SCIATICA: ICD-10-CM

## 2023-07-13 DIAGNOSIS — M79.18 MYOFASCIAL PAIN SYNDROME: Primary | ICD-10-CM

## 2023-07-13 DIAGNOSIS — M54.42 CHRONIC BILATERAL LOW BACK PAIN WITH BILATERAL SCIATICA: ICD-10-CM

## 2023-07-13 DIAGNOSIS — G89.4 CHRONIC PAIN SYNDROME: ICD-10-CM

## 2023-07-13 DIAGNOSIS — M54.9 MID BACK PAIN: ICD-10-CM

## 2023-07-13 DIAGNOSIS — G89.29 CHRONIC BILATERAL LOW BACK PAIN WITH BILATERAL SCIATICA: ICD-10-CM

## 2023-07-13 PROCEDURE — 20553 NJX 1/MLT TRIGGER POINTS 3/>: CPT | Performed by: PHYSICAL MEDICINE & REHABILITATION

## 2023-07-13 RX ORDER — TRIAMCINOLONE ACETONIDE 40 MG/ML
40 INJECTION, SUSPENSION INTRA-ARTICULAR; INTRAMUSCULAR ONCE
Status: COMPLETED | OUTPATIENT
Start: 2023-07-13 | End: 2023-07-13

## 2023-07-13 RX ORDER — LIDOCAINE HYDROCHLORIDE 10 MG/ML
4 INJECTION, SOLUTION EPIDURAL; INFILTRATION; INTRACAUDAL; PERINEURAL ONCE
Status: COMPLETED | OUTPATIENT
Start: 2023-07-13 | End: 2023-07-13

## 2023-07-13 RX ORDER — LIDOCAINE 40 MG/G
CREAM TOPICAL
COMMUNITY
Start: 2023-07-12

## 2023-07-13 RX ADMIN — LIDOCAINE HYDROCHLORIDE 4 ML: 10 INJECTION, SOLUTION EPIDURAL; INFILTRATION; INTRACAUDAL; PERINEURAL at 13:03

## 2023-07-13 RX ADMIN — TRIAMCINOLONE ACETONIDE 40 MG: 40 INJECTION, SUSPENSION INTRA-ARTICULAR; INTRAMUSCULAR at 13:04

## 2023-07-13 NOTE — PATIENT INSTRUCTIONS
Trigger Point Injection   AMBULATORY CARE:   A trigger point injection  is used to relax a muscle knot. This helps relieve pain. You may be able to have more than one trigger point treated during a session. How to prepare for a trigger point injection:   Your healthcare provider will tell you how to prepare. Arrange to have someone drive you home after the injection. Tell your provider about all medicines you take, including pain medicine, blood thinners, and muscle relaxers. He or she will tell you if you need to stop any medicine for the injection, and when to stop. He or she will tell you which medicines to take or not take on the day of the injection. Tell your provider about all your allergies, including to any pain medicine. What will happen during a trigger point injection:   You may be sitting or lying, depending on where the trigger point is located. Your healthcare provider will feel for a knot in the muscle. He or she may jorge your skin over the knot. Your provider will put a needle through your skin and into the trigger point. Saline (salt solution), pain relievers, or other medicines may be pushed through the needle into the trigger point. Your provider may use only a dry needle (no medicine). He or she will pull the needle almost all the way out and then push it in again. He or she will repeat this several times until the muscle stops twitching or feels relaxed. Your provider will remove the needle and stretch the muscle area. He or she may apply pressure to the area for 2 minutes. A bandage will be put over the injection site to prevent bleeding or an infection. What to expect after a trigger point injection:   You may feel pain relief right away. It is normal for some pain to start again 2 hours later. An ice pack or over-the-counter pain medicine can help lower the pain. You may feel sore in the injection site for a few days.  If you need another injection in the same area, wait until the area is not sore. Your healthcare provider may give you specific activity instructions to follow at home or recommend physical therapy. In general, you should try to stay active. Avoid strenuous activity for the first 3 or 4 days after the injection. Do not have more injections if you still have trigger point pain after 2 or 3 injections. Risks of a trigger point injection:  You may have a severe allergic reaction to pain medicine injected. The injection may be painful, or you may be sore where you got the injection. You may bleed, bruise, or develop an infection in the injection area. The injection may cause you to feel faint. Rarely, the needle may cause muscle or blood vessel damage or your lung may collapse if you get the injection near your chest.  Call your local emergency number ([phone] in the [address]), or have someone call if:   Your mouth and throat are swollen. You are wheezing or have trouble breathing. You have chest pain or your heart is beating faster than usual.    You feel like you are going to faint. When should I seek immediate care? Your face is red or swollen. You have hives that spread over your body. You feel weak or dizzy. Call your doctor or pain specialist if:   You have a fever within 1 week of the injection. You have redness or swelling within 1 week of the injection. You have new or worsening pain near the injection site. You have questions or concerns about your condition or care. Self-care:   Stay active after you have trigger point injections. Gently move your joints through their full range of motion during the first week. Avoid strenuous activity for 3 or 4 days. Do regular stretches of the trigger point muscle. Place gentle pressure on the trigger point, and then stretch the muscle. Ask your healthcare provider for more information about how to stretch and apply pressure. Apply ice to the injection site.   Use an ice pack, or put ice in a plastic bag. Cover the bag with a towel before you apply it. Apply ice for 15 to 20 minutes every hour, or as directed. Apply heat to trigger point sites. Heat can help relax muscles and relieve trigger point pain. Use a heat pack or a heating pad set on low. Apply heat for 15 minutes every hour, or as directed. Follow up with your doctor or pain specialist as directed:  Write down your questions so you remember to ask them during your visits. © Copyright Delene Outhouse 2022 Information is for End User's use only and may not be sold, redistributed or otherwise used for commercial purposes. The above information is an  only. It is not intended as medical advice for individual conditions or treatments. Talk to your doctor, nurse or pharmacist before following any medical regimen to see if it is safe and effective for you.

## 2023-07-13 NOTE — PROGRESS NOTES
Assessment:  1. Myofascial pain syndrome    2. Neck pain    3. Mid back pain    4. Chronic bilateral low back pain with bilateral sciatica    5. Chronic pain syndrome        Plan:  While the patient was in the office today, I did have a thorough conversation regarding their chronic pain syndrome, medication management, and treatment plan options. After discussing options I recommended the patient proceed with trigger point injections to address the myofascial component of her pain pattern. Procedure Note:  After fully informed written consent was obtained, the above procedure was performed. Using aseptic technique a 25-gauge needle was placed in the region of the left trapezius muscle, left paraspinal cervical and left scalene. Approximately 4 cc of 1% Lidocaine 40 mg of Kenalog was injected in a fan-like fashion after negative aspiration with each cc. After adequate anesthesia was obtained, the areas were dry-needled. Complications:  None. Disposition: Motor function was intact. Vital signs stable. The patient was discharged home. The discharge instruction sheet was given to the patient. The patient was discharged with instructions to call immediately if there are any complications. I did review the trigger point injection discharge instructions with the patient. I did explain that it is normal to feel an increase in soreness and/or pain, and even bruising. I did encourage heat/cold regiments, which ever decreases pain, as well as continuing a home stretching program.    In order to address the myofascial component in the thoracic region I have recommended that she proceed with trigger point injections under ultrasound guidance to ensure safe needle placement. My impressions and treatment recommendations were discussed in detail with the patient who verbalized understanding and had no further questions. Discharge instructions were provided.  I personally saw and examined the patient and I agree with the above discussed plan of care. No orders of the defined types were placed in this encounter. New Medications Ordered This Visit   Medications   • lidocaine (LMX) 4 % cream   • triamcinolone acetonide (KENALOG-40) 40 mg/mL injection 40 mg   • lidocaine (PF) (XYLOCAINE-MPF) 1 % injection 4 mL       History of Present Illness:  Josie Irwin is a 46 y.o. female who presents for a follow up office visit in regards to Back Pain. The patient’s current symptoms include chronic spinal pain that she presently rates a 10 out of 10 on the pain scale. She presents today with increased left-sided neck pain and left upper to mid thoracic pain. She describes it as a constant sharp, throbbing, cramping and pressure-like pain. She presents today to discuss trigger point injections as they have been very beneficial in reducing the pain she is currently experiencing. I have personally reviewed and/or updated the patient's past medical history, past surgical history, family history, social history, current medications, allergies, and vital signs today. Review of Systems   Constitutional: Negative for chills and fever. HENT: Negative for ear pain and sore throat. Eyes: Negative for pain and visual disturbance. Respiratory: Positive for chest tightness and shortness of breath. Negative for cough. Cardiovascular: Negative for chest pain and palpitations. Gastrointestinal: Positive for abdominal pain (left), constipation, nausea and vomiting. Genitourinary: Negative for dysuria and hematuria. Musculoskeletal: Positive for arthralgias, back pain, gait problem, joint swelling and myalgias. Skin: Negative for color change and rash. Neurological: Positive for dizziness. Negative for seizures and syncope. Psychiatric/Behavioral: Positive for confusion. All other systems reviewed and are negative.       Patient Active Problem List   Diagnosis   • Type 2 diabetes mellitus with stage 3 chronic kidney disease, with long-term current use of insulin ([facility])   • Dyslipidemia   • Granulomatosis with polyangiitis ([facility])   • MPA (microscopic polyangiitis) ([facility])   • Asthma   • Benign essential HTN   • Chronic right shoulder pain   • Noncompliance   • Bipolar 1 disorder ([facility])   • Epigastric pain   • Pancreatitis   • Ovarian cyst   • Postherpetic neuralgia   • Anemia of chronic disease   • Arthralgia of multiple joints   • Cataplexy   • Weakness of both lower extremities   • Chronic pelvic pain in female   • Plantar wart, right foot   • Seasonal allergic rhinitis due to pollen   • Gastroesophageal reflux disease   • Weakness of both upper extremities   • Persistent proteinuria   • Left leg pain   • Controlled substance agreement signed   • Chronic narcotic use   • Small fiber neuropathy   • IBS (irritable bowel syndrome)   • Leukocytosis   • Gastroparesis   • Hyperosmia   • Smell disturbance   • Contusion of left hip   • Trochanteric bursitis of left hip   • Neuropathy   • Attention deficit hyperactivity disorder (ADHD)   • Elevated blood pressure reading   • Costochondritis   • Hearing difficulty of both ears   • Vaginal atrophy   • Alpha-hemolytic Streptococcus positive urine culture   • Schizo-affective schizophrenia ([facility])   • 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[facility])   • Cervical radiculopathy   • Finger numbness   • Fall at home, initial encounter   • Bradycardia   • Marijuana use   • Difficulty ventilating with mask   • Chronic bilateral low back pain without sciatica   • Lump of skin of back   • Cervical disc disorder with radiculopathy of mid-cervical region   • Neck pain   • Cervical spinal stenosis   • Depression   • Continuous opioid dependence ([facility])   • Chronic kidney disease, stage 4 (severe) ([facility])   • Chronic pain   • COVID   • PTSD (post-traumatic stress disorder)   • Pain of finger of right hand   • Bilateral sacroiliitis ([facility])   • Myofascial pain syndrome   • Lumbar spondylosis   • Chronic right hip pain • Vomiting   • Osteopenia   • Pain in finger of left hand   • Intrinsic urethral sphincter deficiency   • Urinary incontinence, mixed   • Other constipation   • Polyarthritis   • Other secondary hypertension   • Left wrist pain   • Insomnia   • Finger injury, left, subsequent encounter   • Paresthesias with subjective weakness       Past Medical History:   Diagnosis Date   • ADHD    • Anemia of chronic disease    • Anxiety    • Arthritis ? • Asthma    • Bipolar disorder ([address])    • Borderline personality disorder ([address])    • Cataplexy    • Chronic abdominal pain    • Chronic kidney disease ? • CKD (chronic kidney disease) stage 3, GFR 30-59 ml/min (HCC)    • Cushing syndrome (HCC)    • Depression ? • Diabetes mellitus ([address])    • DVT (deep venous thrombosis) (HCC)    • GERD (gastroesophageal reflux disease)    • Headache(784.0) 3 months   • History of acute pancreatitis     felt secondary to Bactrim   • History of transfusion    • Hypertension    • Liver disease     fatty liver   • Microscopic polyangiitis (HCC)    • Ovarian cyst    • PTSD (post-traumatic stress disorder)    • Self-inflicted injury     self inflicted skin wounds   • Sleep apnea    • Wegener's granulomatosis with renal involvement ([address]) 2015       Past Surgical History:   Procedure Laterality Date   • ESOPHAGOGASTRODUODENOSCOPY  09/11/2015    mild antral gastritis   • GASTRIC STIMULATOR IMPLANT SURGERY  06/25/2020   • [place] COLONOSCOPY FLX DX W/COLLJ SPEC WHEN PFRMD N/A 12/14/2018    adenoma removed from the transverse, hyperplastic polyp removed from the left colon   • [place] ESOPHAGOGASTRODUODENOSCOPY TRANSORAL DIAGNOSTIC N/A 12/14/2018    gastritis and scant coffee-ground material.  Biopsies negative for H. pylori   • [place] OPEN TX RADIAL&ULNAR SHAFT FX W/FIXJ RADIUS&ULNA Left 6/23/2022    Procedure: OPEN REDUCTION W/ INTERNAL FIXATION (ORIF) RADIUS / ULNA (WRIST);   Surgeon: Jermaine Pedroza MD;  Location: [facility];  Service: Orthopedics   • RELEASE SCAR CONTRACTURE / GRAFT REPAIRS OF HAND Bilateral    • UPPER GASTROINTESTINAL ENDOSCOPY  2019    Dr Darrell Teran.  Botox to the pylorus       Family History   Problem Relation Age of Onset   • Arthritis Mother    • Depression Mother    • Diabetes Mother    • Mental illness Mother    • Migraines Mother    • No Known Problems Father    • Colon cancer Neg Hx    • Drug abuse Neg Hx         mother father   • Mental illness Neg Hx         disorder, mother father   • Cancer Neg Hx    • Breast cancer Neg Hx        Social History     Occupational History   • Occupation: disability   Tobacco Use   • Smoking status: Former     Packs/day: 1.00     Years: 10.00     Total pack years: 10.00     Types: Cigarettes     Quit date: 2011     Years since quittin.5   • Smokeless tobacco: Never   • Tobacco comments:     Stopped smoking 11 years ago   Vaping Use   • Vaping Use: Never used   Substance and Sexual Activity   • Alcohol use: Never   • Drug use: Yes     Types: Marijuana     Comment: marijuana daily   • Sexual activity: Not Currently     Partners: Male     Birth control/protection: None       Current Outpatient Medications on File Prior to Visit   Medication Sig   • acetaminophen (TYLENOL) 500 mg tablet Take 2 tablets (1,000 mg total) by mouth every 8 (eight) hours as needed for mild pain   • al mag oxide-diphenhydramine-lidocaine viscous (MAGIC MOUTHWASH) 1:1:1 suspension Swish and spit 10 mL every 4 (four) hours as needed for mouth pain or discomfort   • albuterol (2.5 mg/3 mL) 0.083 % nebulizer solution USE 1 VIAL VIA NEBULIZER EVERY 6 HOURS AS NEEDED FOR WHEEZING OR SHORTNESS OF BREATH   • albuterol (PROVENTIL HFA,VENTOLIN HFA) 90 mcg/act inhaler INHALE 2 PUFFS BY MOUTH EVERY 6 HOURS AS NEEDED FOR WHEEZING OR SHORTNESS OF BREATH   • Alcohol Swabs (Alcohol Pads) 70 % PADS Use 4 (four) times a day   • amphetamine-dextroamphetamine (ADDERALL XR) 20 MG 24 hr capsule Take 1 capsule (20 mg total) by mouth 2 (two) times a day Max Daily Amount: 40 mg   • amphetamine-dextroamphetamine (ADDERALL, 20MG,) 20 mg tablet Take 1 tablet (20 mg total) by mouth 2 (two) times a day Max Daily Amount: 40 mg   • Blood Glucose Monitoring Suppl (FreeStyle Lite) DEIRDRE Use daily   • Blood Pressure Monitor KIT Use daily to check blood pressure.    • clobetasol (TEMOVATE) 0.05 % cream APPLY TOPICALLY TO AFFECTED AREAS TWICE A DAY   • Comfort Touch Plus Lancets 30G MISC USE TO TEST THE BLOOD SUGAR THREE TIMES A DAY   • cyclobenzaprine (FLEXERIL) 10 mg tablet Take 1 tablet (10 mg total) by mouth 3 (three) times a day as needed for muscle spasms   • fluconazole (DIFLUCAN) 150 mg tablet    • FREESTYLE LITE test strip USE TO TEST THE BLOOD SUGAR THREE TIMES A DAY AS DIRECTED   • guaiFENesin (ROBITUSSIN) 100 MG/5ML oral liquid Take 10 mL (200 mg total) by mouth 3 (three) times a day as needed for cough or congestion   • haloperidol (HALDOL) 5 mg tablet TAKE ONE TABLET BY MOUTH ONCE DAILY AS NEEDED FOR ABDOMINAL PAIN / NAUSEA   • hydrocortisone 2.5 % cream Apply topically 2 (two) times a day as needed for rash   • lidocaine (XYLOCAINE) 5 % ointment Apply topically 2 (two) times a day as needed for mild pain   • Linzess 72 MCG CAPS TAKE ONE CAPSULE BY MOUTH ONCE DAILY   • naloxone (Narcan) 4 mg/0.1 mL nasal spray ADMINISTER 1 SPRAY IN ONE NOSTRIL IF NO REPONSE AFTER 2-3 MINUTES SPRAY INTO OTHER NOSTRIL WITH NEW spray   • NON FORMULARY [facility]   • NovoLOG FlexPen 100 units/mL injection pen INJECT 6 UNITS SUBCUTANEOUSLY THREE TIMES A DAY WITH MEALS   • ondansetron (Zofran ODT) 8 mg disintegrating tablet Take 1 tablet (8 mg total) by mouth every 8 (eight) hours as needed for nausea or vomiting   • oxyCODONE (Roxicodone) 15 mg immediate release tablet Take 1 tablet (15 mg total) by mouth every 4 (four) hours as needed for moderate pain Max Daily Amount: 90 mg   • Symbicort 80-4.5 MCG/ACT inhaler INHALE 2 PUFFS BY MOUTH TWICE A DAY RINSE MOUTH AFTER USE   • Toujeo Max SoloStar 300 units/mL CONCENTRATED U-300 injection pen (2-unit dial) INJECT 22 UNITS SUBCUTANEOUSLY ONCE DAILY   • [DISCONTINUED] nortriptyline (PAMELOR) 25 mg capsule Take 1 capsule (25 mg total) by mouth daily at bedtime   • buPROPion (WELLBUTRIN XL) 300 mg 24 hr tablet Take 1 tablet (300 mg total) by mouth every morning (Patient not taking: Reported on 2/19/2023)   • Calcium 600 1500 (600 Ca) MG TABS  (Patient not taking: Reported on 2/19/2023)   • calcium carbonate (OS-ALISIA) 600 MG tablet Take 1 tablet (600 mg total) by mouth daily (Patient not taking: Reported on 2/19/2023)   • Cholecalciferol (Vitamin D) 50 MCG (2000 UT) tablet Take 1 tablet (2,000 Units total) by mouth daily (Patient not taking: Reported on 2/19/2023)   • Comfort Touch Insulin Pen Need 33G X 6 MM MISC USE TO INJECT INSULIN FOUR TIMES A DAY ( WITH MEALS AND AT BEDTIME ) (Patient not taking: Reported on 7/13/2023)   • Diclofenac Sodium (VOLTAREN) 1 % APPLY TWO GRAMS TOPICALLY FOUR TIMES A DAY (Patient not taking: Reported on 7/13/2023)   • dicyclomine (BENTYL) 20 mg tablet Take 1 tablet (20 mg total) by mouth in the morning and 1 tablet (20 mg total) in the evening. Do all this for 7 days.    • lidocaine (LMX) 4 % cream    • methylPREDNISolone 4 MG tablet therapy pack Use as directed on package (Patient not taking: Reported on 3/27/2023)   • nystatin-triamcinolone (MYCOLOG-II) ointment Apply topically 2 (two) times a day (Patient not taking: Reported on 2/19/2023)   • pantoprazole (PROTONIX) 40 mg tablet Take 1 tablet (40 mg total) by mouth 2 (two) times a day before meals (Patient not taking: Reported on 2/19/2023)   • pregabalin (LYRICA) 100 mg capsule Take 1 capsule (100 mg total) by mouth 3 (three) times a day (Patient not taking: Reported on 2/19/2023)   • promethazine (PHENERGAN) 25 mg tablet Take 1 tablet (25 mg total) by mouth every 6 (six) hours as needed for nausea or vomiting (Patient not taking: Reported on 2/19/2023)   • Restasis 0.05 % ophthalmic emulsion  (Patient not taking: Reported on 2/19/2023)   • scopolamine (TRANSDERM-SCOP) 1.5 mg/3 days TD 72 hr patch Place 1 patch on the skin every third day (Patient not taking: Reported on 2/19/2023)     No current facility-administered medications on file prior to visit. Allergies   Allergen Reactions   • Prozac [Fluoxetine Hcl]      SI   • Bactrim [Sulfamethoxazole-Trimethoprim]      Pt "They think that is what cause the pancreatitis"    • Flagyl [Metronidazole] Diarrhea and Abdominal Pain   • Lamictal [Lamotrigine] GI Intolerance   • Lithium Other (See Comments)   • Ibuprofen    • Lexapro [Escitalopram Oxalate] Rash   • Navane [Thiothixene]      SI   • Other      "novaine?" antipsychotic       Physical Exam:    Ht 5' 2" (1.575 m)   Wt 67.6 kg (149 lb)   LMP 04/26/2015   BMI 27.25 kg/m²     Constitutional:normal, well developed, well nourished, alert, in no distress and non-toxic and no overt pain behavior.   Eyes:anicteric  HEENT:grossly intact  Neck:supple, symmetric, trachea midline and no masses   Pulmonary:even and unlabored  Cardiovascular:No edema or pitting edema present  Skin:Normal without rashes or lesions and well hydrated  Psychiatric:Mood and affect appropriate  Neurologic:Cranial Nerves II-XII grossly intact  Musculoskeletal: Patient in wheelchair, tenderness to palpation over the left paraspinal cervical muscles and left trapezius muscle, tender thoracic paraspinals on the left side    Imaging

## 2023-07-16 PROBLEM — R56.9 POSTICTAL STATE (HCC): Status: RESOLVED | Noted: 2021-10-14 | Resolved: 2023-07-16

## 2023-07-16 NOTE — ASSESSMENT & PLAN NOTE
- Patient had previously tried gabapentin, but it caused restless legs syndrome. Patient recently tried Cymbalta, but it caused side effect of dizziness, abdominal pain, vomiting. Patient had tried Lyrica 100 mg, three times daily, however, patient has now stopped taking it due to low blood pressure readings.   -Patient recently started on nortriptyline 10 mg daily at bedtime by pain management, however patient experience side effects of nausea and vomiting so she is now stopped taking the medication.    -Reviewed EMG bilateral lower extremities from 6/5/2023. Results are normal.  - Patient underwent C7-T1 interlaminar epidural steroid injection on 3/27/23 to address increased neck and upper extremity radicular component of her pain pattern. - Patient is due for neurology follow-up, last seen in April 2021. Patient is scheduled for follow-up with neurology in September 2023.

## 2023-07-16 NOTE — ASSESSMENT & PLAN NOTE
- Stable. Continue Protonix 40 mg twice daily. - Continue to avoid trigger foods.  - Continue follow-up with gastroenterology as scheduled.

## 2023-07-16 NOTE — ASSESSMENT & PLAN NOTE
- Has placement of gastric stimulator, previously followed by [facility]. - Currently, symptoms have been stable since patient has been very vigilant in watching her diet. Patient notes she has to stay away from salt and acid.  -Continue follow-up with gastroenterology as scheduled.

## 2023-07-16 NOTE — ASSESSMENT & PLAN NOTE
- Patient underwent C7-T1 interlaminar epidural steroid injection on 3/27/23 to address increased neck and upper extremity radicular component of her pain pattern. Patient found moderate relief.   -Continue follow-up with pain management as scheduled.

## 2023-07-16 NOTE — ASSESSMENT & PLAN NOTE
- Symptoms have improved. - Patient was prescribed trazodone, but it was then discontinued since patient had started taking nortriptyline. Due to side effects, patient has now stopped nortriptyline as well. - Will continue to monitor.

## 2023-07-16 NOTE — ASSESSMENT & PLAN NOTE
- History of type 2 diabetes with long-term use of insulin with associated neuropathy and chronic kidney disease stage 3-4.  - Continue Toujeo 22 units daily and NovoLog 6 units, 3 times daily with meals.  - Continue checking daily blood sugars.  -Recommend annual diabetic eye exams. Encourage patient to schedule follow-up with eye doctor.   Lab Results   Component Value Date    HGBA1C 5.7 02/28/2022    HGBA1C 5.7 09/29/2021    HGBA1C 6.0 02/15/2021

## 2023-07-16 NOTE — ASSESSMENT & PLAN NOTE
Lab Results   Component Value Date    EGFR 28 03/28/2023    EGFR 36 02/19/2023    EGFR 31 02/03/2023    CREATININE 1.95 (H) 03/28/2023    CREATININE 1.60 (H) 02/19/2023    CREATININE 1.81 (H) 02/03/2023   - Continue follow-up with nephrology as scheduled. - Continue to avoid NSAIDs/nephrotoxic medications.

## 2023-07-16 NOTE — ASSESSMENT & PLAN NOTE
- Continue oxycodone 15 mg, every 4 hours as needed. PDMP reviewed. No red flags noted. - UDS up to date. - Patient has now stopped taking Lyrica due to low blood pressure readings. Patient has stopped taking nortriptyline due to side effect of nausea and vomiting.  - Continue follow-up with pain management as scheduled.

## 2023-07-16 NOTE — ASSESSMENT & PLAN NOTE
- Stable. Currently taking Adderall 20 mg twice daily due to back order of Adderall XL 20 mg. Will restart adderall XL when it comes back in stock. - PDMP reviewed. No red flags noted.

## 2023-07-24 DIAGNOSIS — F32.A DEPRESSION, UNSPECIFIED DEPRESSION TYPE: ICD-10-CM

## 2023-07-26 RX ORDER — BUPROPION HYDROCHLORIDE 300 MG/1
300 TABLET ORAL EVERY MORNING
Qty: 90 TABLET | Refills: 1 | Status: SHIPPED | OUTPATIENT
Start: 2023-07-26

## 2023-07-30 DIAGNOSIS — F90.9 ATTENTION DEFICIT HYPERACTIVITY DISORDER (ADHD), UNSPECIFIED ADHD TYPE: Chronic | ICD-10-CM

## 2023-08-01 RX ORDER — DEXTROAMPHETAMINE SACCHARATE, AMPHETAMINE ASPARTATE, DEXTROAMPHETAMINE SULFATE AND AMPHETAMINE SULFATE 5; 5; 5; 5 MG/1; MG/1; MG/1; MG/1
20 TABLET ORAL 2 TIMES DAILY
Qty: 60 TABLET | Refills: 0 | Status: SHIPPED | OUTPATIENT
Start: 2023-08-01

## 2023-08-03 ENCOUNTER — TELEPHONE (OUTPATIENT)
Dept: RADIOLOGY | Facility: MEDICAL CENTER | Age: 53
End: 2023-08-03

## 2023-08-03 NOTE — TELEPHONE ENCOUNTER
Patient had YANNI on 4/21/2021. At her one month f/u visit she noted 50% relief. She has TPI on 7/13/2021 for the myofascial component of hep ain and in conjunction notes 7% relief and continues to do her home exercises as shown for her neck.

## 2023-08-04 ENCOUNTER — APPOINTMENT (OUTPATIENT)
Dept: LAB | Facility: CLINIC | Age: 53
End: 2023-08-04
Payer: MEDICARE

## 2023-08-04 DIAGNOSIS — E11.22 TYPE 2 DIABETES MELLITUS WITH STAGE 3 CHRONIC KIDNEY DISEASE, WITH LONG-TERM CURRENT USE OF INSULIN, UNSPECIFIED WHETHER STAGE 3A OR 3B CKD (HCC): Chronic | ICD-10-CM

## 2023-08-04 DIAGNOSIS — Z79.4 TYPE 2 DIABETES MELLITUS WITH STAGE 3 CHRONIC KIDNEY DISEASE, WITH LONG-TERM CURRENT USE OF INSULIN, UNSPECIFIED WHETHER STAGE 3A OR 3B CKD (HCC): Chronic | ICD-10-CM

## 2023-08-04 DIAGNOSIS — I12.9 BENIGN HYPERTENSION WITH CKD (CHRONIC KIDNEY DISEASE) STAGE IV (HCC): ICD-10-CM

## 2023-08-04 DIAGNOSIS — N18.30 TYPE 2 DIABETES MELLITUS WITH STAGE 3 CHRONIC KIDNEY DISEASE, WITH LONG-TERM CURRENT USE OF INSULIN, UNSPECIFIED WHETHER STAGE 3A OR 3B CKD (HCC): Chronic | ICD-10-CM

## 2023-08-04 DIAGNOSIS — M31.31 GRANULOMATOSIS WITH POLYANGIITIS WITH RENAL INVOLVEMENT (HCC): ICD-10-CM

## 2023-08-04 DIAGNOSIS — R80.1 PERSISTENT PROTEINURIA: ICD-10-CM

## 2023-08-04 DIAGNOSIS — N18.4 BENIGN HYPERTENSION WITH CKD (CHRONIC KIDNEY DISEASE) STAGE IV (HCC): ICD-10-CM

## 2023-08-04 DIAGNOSIS — N18.32 STAGE 3B CHRONIC KIDNEY DISEASE (HCC): ICD-10-CM

## 2023-08-04 LAB
ALBUMIN SERPL BCP-MCNC: 3.6 G/DL (ref 3.5–5)
ALP SERPL-CCNC: 99 U/L (ref 46–116)
ALT SERPL W P-5'-P-CCNC: 24 U/L (ref 12–78)
ANION GAP SERPL CALCULATED.3IONS-SCNC: 5 MMOL/L
AST SERPL W P-5'-P-CCNC: 18 U/L (ref 5–45)
BACTERIA UR QL AUTO: ABNORMAL /HPF
BILIRUB SERPL-MCNC: 0.3 MG/DL (ref 0.2–1)
BILIRUB UR QL STRIP: NEGATIVE
BUN SERPL-MCNC: 35 MG/DL (ref 5–25)
CALCIUM SERPL-MCNC: 9.1 MG/DL (ref 8.3–10.1)
CHLORIDE SERPL-SCNC: 112 MMOL/L (ref 96–108)
CLARITY UR: CLEAR
CO2 SERPL-SCNC: 24 MMOL/L (ref 21–32)
COLOR UR: ABNORMAL
CREAT SERPL-MCNC: 1.98 MG/DL (ref 0.6–1.3)
CREAT UR-MCNC: 178 MG/DL
ERYTHROCYTE [DISTWIDTH] IN BLOOD BY AUTOMATED COUNT: 12.8 % (ref 11.6–15.1)
EST. AVERAGE GLUCOSE BLD GHB EST-MCNC: 134 MG/DL
GFR SERPL CREATININE-BSD FRML MDRD: 28 ML/MIN/1.73SQ M
GLUCOSE P FAST SERPL-MCNC: 87 MG/DL (ref 65–99)
GLUCOSE UR STRIP-MCNC: NEGATIVE MG/DL
HBA1C MFR BLD: 6.3 %
HCT VFR BLD AUTO: 42.1 % (ref 34.8–46.1)
HGB BLD-MCNC: 13.7 G/DL (ref 11.5–15.4)
HGB UR QL STRIP.AUTO: NEGATIVE
KETONES UR STRIP-MCNC: NEGATIVE MG/DL
LEUKOCYTE ESTERASE UR QL STRIP: ABNORMAL
MCH RBC QN AUTO: 31.2 PG (ref 26.8–34.3)
MCHC RBC AUTO-ENTMCNC: 32.5 G/DL (ref 31.4–37.4)
MCV RBC AUTO: 96 FL (ref 82–98)
NITRITE UR QL STRIP: NEGATIVE
NON-SQ EPI CELLS URNS QL MICRO: ABNORMAL /HPF
PH UR STRIP.AUTO: 6 [PH]
PHOSPHATE SERPL-MCNC: 3.1 MG/DL (ref 2.7–4.5)
PLATELET # BLD AUTO: 241 THOUSANDS/UL (ref 149–390)
PMV BLD AUTO: 11.3 FL (ref 8.9–12.7)
POTASSIUM SERPL-SCNC: 4.3 MMOL/L (ref 3.5–5.3)
PROT SERPL-MCNC: 7.9 G/DL (ref 6.4–8.4)
PROT UR STRIP-MCNC: ABNORMAL MG/DL
PROT UR-MCNC: 64 MG/DL
PROT/CREAT UR: 0.36 MG/G{CREAT} (ref 0–0.1)
PTH-INTACT SERPL-MCNC: 83.7 PG/ML (ref 12–88)
RBC # BLD AUTO: 4.39 MILLION/UL (ref 3.81–5.12)
RBC #/AREA URNS AUTO: ABNORMAL /HPF
SODIUM SERPL-SCNC: 141 MMOL/L (ref 135–147)
SP GR UR STRIP.AUTO: 1.02 (ref 1–1.03)
URATE SERPL-MCNC: 5.7 MG/DL (ref 2–7.5)
UROBILINOGEN UR STRIP-ACNC: <2 MG/DL
WBC # BLD AUTO: 5.67 THOUSAND/UL (ref 4.31–10.16)
WBC #/AREA URNS AUTO: ABNORMAL /HPF

## 2023-08-04 PROCEDURE — 81001 URINALYSIS AUTO W/SCOPE: CPT

## 2023-08-04 PROCEDURE — 85027 COMPLETE CBC AUTOMATED: CPT

## 2023-08-04 PROCEDURE — 83970 ASSAY OF PARATHORMONE: CPT

## 2023-08-04 PROCEDURE — 83036 HEMOGLOBIN GLYCOSYLATED A1C: CPT

## 2023-08-04 PROCEDURE — 83520 IMMUNOASSAY QUANT NOS NONAB: CPT

## 2023-08-04 PROCEDURE — 84100 ASSAY OF PHOSPHORUS: CPT

## 2023-08-04 PROCEDURE — 86037 ANCA TITER EACH ANTIBODY: CPT

## 2023-08-04 PROCEDURE — 36415 COLL VENOUS BLD VENIPUNCTURE: CPT

## 2023-08-04 PROCEDURE — 80053 COMPREHEN METABOLIC PANEL: CPT

## 2023-08-04 PROCEDURE — 84550 ASSAY OF BLOOD/URIC ACID: CPT

## 2023-08-07 NOTE — TELEPHONE ENCOUNTER
CORRECTION: Patient notes 70% relief overall from her last injection lasting until a few days ago, ~8/1. She also continues to do her home exercises as shown to her for her neck and arm pain along with stretches.

## 2023-08-09 LAB
C-ANCA TITR SER IF: NORMAL TITER
MYELOPEROXIDASE AB SER IA-ACNC: 0.4 UNITS (ref 0–0.9)
P-ANCA ATYPICAL TITR SER IF: NORMAL TITER
P-ANCA TITR SER IF: NORMAL TITER
PROTEINASE3 AB SER IA-ACNC: <0.2 UNITS (ref 0–0.9)

## 2024-04-22 NOTE — TELEPHONE ENCOUNTER
----- Message from Adore Hollingsworth MA sent at 4/22/2024  9:44 AM CDT -----  Pt sent message below.  ----- Message -----  From: Ashly Butler  Sent: 4/22/2024   8:35 AM CDT  To: [facility] Staff    Patient is requesting a call back regarding having a MRI today. Call back number is [phone]. Thx EL   Patient calling on status of refill

## 2025-04-07 NOTE — TELEPHONE ENCOUNTER
No protocol for requested medication.    Last office visit date: 3/14/25  Pharmacy: [id number] [place] [id number]    Order pended, routed to clinician for review.      Patient report: 40% improvement post injection  Pain Level: 5/10

## (undated) DEVICE — OCCLUSIVE GAUZE STRIP,3% BISMUTH TRIBROMOPHENATE IN PETROLATUM BLEND: Brand: XEROFORM

## (undated) DEVICE — SUT VICRYL PLUS 2-0 CTB-1 27 IN VCPB259H

## (undated) DEVICE — SUT ETHILON 2-0 FSLX 30 IN 1674H

## (undated) DEVICE — 1.8MM DRILL BIT WITH DEPTH MARK/QC/110MM

## (undated) DEVICE — 1.25MM KIRSCHNER WIRE W/TROCAR POINT 150MM
Type: IMPLANTABLE DEVICE | Site: WRIST | Status: NON-FUNCTIONAL
Removed: 2022-06-23

## (undated) DEVICE — SINGLE-USE POLYPECTOMY SNARE: Brand: ROTATABLE SNARE

## (undated) DEVICE — PLUMEPEN PRO 10FT

## (undated) DEVICE — GAUZE SPONGES,16 PLY: Brand: CURITY

## (undated) DEVICE — SINGLE-USE BIOPSY FORCEPS: Brand: RADIAL JAW 4

## (undated) DEVICE — STERILE BETHLEHEM PLASTIC HAND: Brand: CARDINAL HEALTH

## (undated) DEVICE — DRAPE C-ARM X-RAY

## (undated) DEVICE — GLOVE SRG BIOGEL 9

## (undated) DEVICE — PADDING CAST 4 IN  COTTON STRL

## (undated) DEVICE — PAD GROUNDING ADULT

## (undated) DEVICE — ACE WRAP 4 IN UNSTERILE

## (undated) DEVICE — CUFF TOURNIQUET 18 X 4 IN QUICK CONNECT DISP 1 BLADDER

## (undated) DEVICE — GLOVE INDICATOR PI UNDERGLOVE SZ 9 BLUE

## (undated) DEVICE — INTENDED FOR TISSUE SEPARATION, AND OTHER PROCEDURES THAT REQUIRE A SHARP SURGICAL BLADE TO PUNCTURE OR CUT.: Brand: BARD-PARKER SAFETY BLADES SIZE 15, STERILE